# Patient Record
Sex: MALE | Race: BLACK OR AFRICAN AMERICAN | NOT HISPANIC OR LATINO | Employment: OTHER | URBAN - METROPOLITAN AREA
[De-identification: names, ages, dates, MRNs, and addresses within clinical notes are randomized per-mention and may not be internally consistent; named-entity substitution may affect disease eponyms.]

---

## 2017-02-13 DIAGNOSIS — Z76.82 ORGAN TRANSPLANT CANDIDATE: Primary | ICD-10-CM

## 2017-02-13 DIAGNOSIS — N18.6 ESRD (END STAGE RENAL DISEASE): Primary | ICD-10-CM

## 2017-02-14 ENCOUNTER — HOSPITAL ENCOUNTER (OUTPATIENT)
Dept: RADIOLOGY | Facility: HOSPITAL | Age: 42
Discharge: HOME | End: 2017-02-14
Attending: NURSE PRACTITIONER | Admitting: INTERNAL MEDICINE
Payer: MEDICARE

## 2017-02-14 ENCOUNTER — HOSPITAL ENCOUNTER (OUTPATIENT)
Dept: CARDIOLOGY | Facility: CLINIC | Age: 42
Discharge: HOME | End: 2017-02-14
Payer: MEDICARE

## 2017-02-14 ENCOUNTER — HOSPITAL ENCOUNTER (OUTPATIENT)
Dept: RADIOLOGY | Facility: HOSPITAL | Age: 42
Discharge: HOME | End: 2017-02-14
Attending: NURSE PRACTITIONER
Payer: MEDICARE

## 2017-02-14 ENCOUNTER — OFFICE VISIT (OUTPATIENT)
Dept: TRANSPLANT | Facility: CLINIC | Age: 42
End: 2017-02-14
Payer: MEDICARE

## 2017-02-14 VITALS
HEIGHT: 67 IN | WEIGHT: 161.63 LBS | SYSTOLIC BLOOD PRESSURE: 158 MMHG | RESPIRATION RATE: 18 BRPM | OXYGEN SATURATION: 97 % | TEMPERATURE: 98 F | HEART RATE: 60 BPM | DIASTOLIC BLOOD PRESSURE: 93 MMHG | BODY MASS INDEX: 25.37 KG/M2

## 2017-02-14 DIAGNOSIS — R41.89 PERSISTENT COGNITIVE IMPAIRMENT: ICD-10-CM

## 2017-02-14 DIAGNOSIS — Z76.82 ORGAN TRANSPLANT CANDIDATE: ICD-10-CM

## 2017-02-14 DIAGNOSIS — Z99.2 CONTROLLED TYPE 2 DIABETES MELLITUS WITH CHRONIC KIDNEY DISEASE ON CHRONIC DIALYSIS, WITHOUT LONG-TERM CURRENT USE OF INSULIN: Chronic | ICD-10-CM

## 2017-02-14 DIAGNOSIS — I35.9 NONRHEUMATIC AORTIC VALVE DISORDER: ICD-10-CM

## 2017-02-14 DIAGNOSIS — N25.81 HYPERPARATHYROIDISM, SECONDARY RENAL: ICD-10-CM

## 2017-02-14 DIAGNOSIS — N18.6 ESRD (END STAGE RENAL DISEASE): Primary | ICD-10-CM

## 2017-02-14 DIAGNOSIS — N18.6 CONTROLLED TYPE 2 DIABETES MELLITUS WITH CHRONIC KIDNEY DISEASE ON CHRONIC DIALYSIS, WITHOUT LONG-TERM CURRENT USE OF INSULIN: Chronic | ICD-10-CM

## 2017-02-14 DIAGNOSIS — E11.22 CONTROLLED TYPE 2 DIABETES MELLITUS WITH CHRONIC KIDNEY DISEASE ON CHRONIC DIALYSIS, WITHOUT LONG-TERM CURRENT USE OF INSULIN: Chronic | ICD-10-CM

## 2017-02-14 LAB
DIASTOLIC DYSFUNCTION: YES
ESTIMATED PA SYSTOLIC PRESSURE: 19
RETIRED EF AND QEF - SEE NOTES: 60 (ref 55–65)
TRICUSPID VALVE REGURGITATION: ABNORMAL

## 2017-02-14 PROCEDURE — 71020 XR CHEST PA AND LATERAL: CPT | Mod: 26,,, | Performed by: RADIOLOGY

## 2017-02-14 PROCEDURE — 99213 OFFICE O/P EST LOW 20 MIN: CPT | Mod: PBBFAC

## 2017-02-14 PROCEDURE — 76770 US EXAM ABDO BACK WALL COMP: CPT | Mod: 26,GC,, | Performed by: RADIOLOGY

## 2017-02-14 PROCEDURE — 93306 TTE W/DOPPLER COMPLETE: CPT | Mod: 26,S$PBB,, | Performed by: INTERNAL MEDICINE

## 2017-02-14 PROCEDURE — 99999 PR PBB SHADOW E&M-EST. PATIENT-LVL III: CPT | Mod: PBBFAC,,,

## 2017-02-14 PROCEDURE — 99215 OFFICE O/P EST HI 40 MIN: CPT | Mod: S$PBB,,, | Performed by: INTERNAL MEDICINE

## 2017-02-14 NOTE — MR AVS SNAPSHOT
[facility]  [address]  [facility] [address]  Phone: [phone]                  Haroldo Dickinson   2017 12:30 PM   Office Visit    Description:  Male : 1975   Provider:  KIDNEY LISTED, TRANSPLANT   Department:  [facility]           Reason for Visit     Kidney Transplant Evaluation           Diagnoses this Visit        Comments    ESRD (end stage renal disease)    -  Primary     Controlled type 2 diabetes mellitus with chronic kidney disease on chronic dialysis, without long-term current use of insulin         Renal hypertension, stage 5 chronic kidney disease or end stage renal disease                To Do List           Future Appointments        Provider Department Dept Phone    2017 4:00 PM ECHO, [facility] [phone]      Goals (5 Years of Data)     None      [facility]     [facility] Care [facility] -  Assistance  Registered nurses in the [facility] provide clinical advisement, health education, appointment booking, and other advisory services.  Call for this free service at [phone].             Medications           Message regarding Medications     Verify the changes and/or additions to your medication regime listed below are the same as discussed with your clinician today.  If any of these changes or additions are incorrect, please notify your healthcare provider.             Verify that the below list of medications is an accurate representation of the medications you are currently taking.  If none reported, the list may be blank. If incorrect, please contact your healthcare provider. Carry this list with you in case of emergency.           Current Medications     amlodipine (NORVASC) 5 MG tablet Take 1 tablet (5 mg total) by mouth 2 (two) times daily.    aspirin 81 MG Chew Take 1 tablet (81 mg total) by mouth once daily.    calcium acetate (CALPHRON) 667 mg tablet Take 1,334 mg by mouth 3  "(three) times daily with meals.    metoprolol tartrate (LOPRESSOR) 25 MG tablet Take 25 mg by mouth 2 (two) times daily.    RENVELA 800 mg Tab 800 mg 3 (three) times daily with meals.            Clinical Reference Information           Your Vitals Were     BP Pulse Temp Resp Height Weight    158/93 (BP Location: Left arm, Patient Position: Sitting, BP Method: Automatic) 60 98.2 °F (36.8 °C) (Oral) 18 5' 7" (1.702 m) 73.3 kg (161 lb 9.6 oz)    SpO2 BMI             97% 25.31 kg/m2         Blood Pressure          Most Recent Value    BP  (!)  158/93      Allergies as of 2/14/2017     No Known Allergies      Immunizations Administered on Date of Encounter - 2/14/2017     None      Maintenance Dialysis History     Start End Type Comments Center    12/16/2013 1/6/2016 Hemo  [facility]            Current Dialysis Center Information     No center information to display.            Transplant Information        Txp Date Organ Coordinator Care Team     Kidney Le Brito RN Referring Physician:  Kristy Pastrana DO   Current Nephrologist:  DO Suzan CottoNetwork [facility] Sign-Up     Activating your [facility] account is as easy as 1-2-3!     1) Visit [email or web address], select Sign Up Now, enter this activation code and your date of birth, then select Next.  [email or web address]  Expires: 3/31/2017  3:04 PM      2) Create a username and password to use when you visit [facility] in the future and select a security question in case you lose your password and select Next.    3) Enter your e-mail address and click Sign Up!    Additional Information  If you have questions, please e-mail [email or web address] or call [phone] to talk to our [facility] staff. Remember, [facility] is NOT to be used for urgent needs. For medical emergencies, dial 911.         Instructions    From your doctor:  Thank you for visiting us today and considering kidney transplantation.    Work with your dialysis team to keep " phosphorus and PTH controlled.  If they remain poorly controlled, it can prevent you from having a transplant.  Please feel free to contact us with any questions or concerns.  Regards,  Dr. Malgorzata Mccray        Kidney Transplant  A kidney transplant is the preferred treatment for kidney failure. It is a surgery to place a donated kidney into your body. This kidney takes over the job of filtering your blood. The transplant is the treatment closest to having your own healthy kidney.  Finding a new kidney  The new kidney may come from a living person (living donor).  This person does not have to be related to you as long as his or her kidney matches your immune system. A person can safely donate a kidney as long as he or she is in good health and passes all the medical tests. Living donor kidneys function much longer than  donor kidney. . Therefore, you are strongly encouraged to discuss this with your friends and family usually a family member. A kidney may also come from a person who has recently  ( donor). In these cases, permission is needed from the donor's family. There is a waiting list for kidneys from  donors ranging from months to years, depending on your blood type.  Preparing for surgery  You have to undergo a series of tests in the transplant center to make sure that you are eligible for transplantation.  · Tests are done to make sure you don't have any serious infections, such as AIDS or hepatitis B.  · Your vital organs, such as the heart and lungs, need to be working well to withstand the surgery and the anti-rejection medications.  · A  will assess your financial situation and social support.  · Your body's immune system will try to damage the new kidney immediately after the surgery (acute rejection). Taking anti-rejection medications right after the surgery can prevent this.  · Your doctor and nurse will explain your roles and responsibilities with the new  kidney. Keeping the kidney working for a long time requires your deep involvement.  · Your doctor will discuss the possible complications of surgery as well as the possible side effects of the post transplant medications you will need to take.  · Tests are also done to make sure that the donor kidney is compatible with your body's immune system, to prevent an acute rejection reaction after the transplantation.    The transplant surgery  · The surgery usually takes about 3 to 6 hours.  · All kidneys are screened for disease before the transplant.  · In most cases, your old kidneys will not be removed. This is because even failed kidneys release chemicals that help your body work.  · The new kidney is placed in the lower part of your abdomen near your groin.  · The new kidney is attached to nearby blood vessels. Blood can then flow through to be filtered.  · The ureter is connected to your bladder to let urine flow out.  Recovering from surgery  You (and any living donor) will recover in the hospital after surgery. The donor may stay in the hospital for up to a week. You may stay longer. Your new kidney may start to work right away or may take a few weeks. You will be told what to do and what not to do while youre healing.  After your surgery  You will need to take anti-rejection medications to keep your body from rejecting your transplanted kidney. These will be needed for the rest of your life. Your health care team will discuss possible side effects of these medications with you. You will need to monitor your health for signs of kidney rejection including decreasing urine output, fevers, pain at the site of your new kidney  Notes to the living donor  Here are some things to know before donating a kidney:  · You can only donate if your blood type is compatible with the recipient's. Other types of matching will also be done. The testing process may require a hospital stay of 24 to 48 hours.  · You will be given a  "thorough medical checkup to be sure you are healthy enough to donate.  · Your kidney is usually removed by a laparoscopic method, using a few small incisions. In some cases, an "open" procedure is used, which requires a longer incision. Recovery time is generally longer after an open procedure.  · If you have one kidney removed, the other kidney will take over and keep you healthy. Talk to your doctor about the possible complications of surgery.  · Some of the costs of donating a kidney are covered by the recipient's insurance.  The need for kidneys for transplantation continues to increase, while the number of kidney living and  donors has remained  constant. If you want to donate a kidney when you die, contact The Human Resources and Services Administration at www.organdonor.gov for more information. Additional information can also be found at the [employer] at www.transplantliving.org.  Date Last Reviewed: 2014  © 5249-3609 [employer]. [address] [place] [address]. All rights reserved. This information is not intended as a substitute for professional medical care. Always follow your healthcare professional's instructions.        Kidney Disease: Balancing Calcium and Phosphorus    Calcium and phosphorus are minerals found in many foods. Your body works best when these minerals are in balance. But if you have kidney disease, phosphorus may build up in your blood. This can weaken your bones over time. To help keep your bones strong, control the amount of phosphorus in your body. This sheet tells you how.  Take phosphate binders  Phosphate binders are medications that stick to the phosphorus in the food you eat. This keeps the phosphorus from being absorbed into your body. Instead, the phosphorus passes from your body with stool (solid waste). For best results, keep these tips in mind:  ? Use only the type of phosphate binder that your health care " provider recommends. The type of binder that you should be taking is ___________________________  ? Always take phosphate binders as directed.  ? With meals  ? Other _________________________  ? Phosphate binders can cause constipation. You may need to eat more fiber or take stool softeners.  Limit these foods   Do not eat these foods   To keep calcium and phosphorus in balance, limit the amount of phosphorus you eat. To do so, eat less of the following foods:  · Milk  · Chocolate  · Cheese  · Beer  · Yogurt  · Soybeans  · Ice cream   Some foods are so high in phosphorus that you may need to stop eating them. Talk with your dietitian before eating these foods:  · Cola drinks  · Dried or baked beans  · Nuts and seeds of all kinds  · Peanut butter  · Split peas  · Whole-grain cereals   Date Last Reviewed: 12/4/2014  © 2910-0757 [employer]. [address] [place] [address]. All rights reserved. This information is not intended as a substitute for professional medical care. Always follow your healthcare professional's instructions.             Language Assistance Services     ATTENTION: Language assistance services are available, free of charge. Please call [phone].      ATENCIÓN: Si corbyla xiomara, tiene a loya disposición servicios gratuitos de asistencia lingüística. Llame al [phone].     SOY Ý: N?u b?n nói Ti?ng Vi?t, có các d?ch v? h? tr? ngôn ng? mi?n phí dành cho b?n. G?i s? [phone].         [facility] complies with applicable Federal civil rights laws and does not discriminate on the basis of race, color, national origin, age, disability, or sex.

## 2017-02-14 NOTE — PROGRESS NOTES
Kidney Transplant Recipient Reevalulation    Referring Physician: Kristy Pastrana  Current Nephrologist: Kristy Pastrana  Waitlist Status: active  Dialysis Start Date: (Not currently on dialysis)    Subjective:     CC:  Annual reassessment of kidney transplant candidacy.    HPI:  Mr. Dickinson is a 41 y.o. year old Black or  male with ESRD secondary to diabetic nephropathy and HTN.  He has been on the wait list for a kidney transplant at [facility] since 12/16/2013. Patient is currently on hemodialysis started on 12/2013. Patient is dialyzing on TTS schedule.  Patient reports that he is tolerating dialysis well.. He has a RUE AV fistula. Patient denies any recent hospitalizations or ED visits.    Emanuel has ESRD from diabetes and hypertension.  Overall he feels he is doing well.  He states he has not gotten any organ offers yet, although coordinator notes that he declined a high K DPI kidney.  His record indicates he has memory issues, but he feels he does fairly well on his own and memory is okay.  He states he is an uric and has no lower urinary tract symptoms.  He states he has had problems with phosphorus control in the dialysis unit.    Pertinent history:  ESRD from DM & HTN since 12/2013 (record indicates primary causes DM, however he believes its HTN)  Secondary hyperparathyroidism of renal origin with phosphorus control  CVA with residual right lower extremity weakness  Cognitive impairment on neuropsych testing, requiring strong and uninterrupted caregiver support  History of NSTEMI 2013 and LVH    Review of Systems   Constitutional: Negative for unexpected weight change.   HENT: Negative.    Eyes: Negative.  Negative for visual disturbance.   Respiratory: Negative for shortness of breath.    Cardiovascular: Negative for chest pain.   Gastrointestinal: Negative for abdominal pain.   Genitourinary: Negative for difficulty urinating.   Musculoskeletal: Negative for gait problem.   Skin: Negative for  rash.   Neurological: Positive for weakness (right-sided).        Significant cognitive impairment confirmed on neuropsychiatric testing.   Hematological: Does not bruise/bleed easily.       Objective:   body mass index is 25.31 kg/(m^2).    Physical Exam   Constitutional: He is oriented to person, place, and time. He appears well-developed and well-nourished.   HENT:   Head: Normocephalic.   Eyes: Conjunctivae are normal.   Neck: No JVD present.   Pulmonary/Chest: Effort normal and breath sounds normal.   Abdominal: Soft. He exhibits no mass. There is no tenderness.   Musculoskeletal: He exhibits no edema.   Lymphadenopathy:     He has no cervical adenopathy.   Neurological: He is alert and oriented to person, place, and time.   Skin: Skin is dry. No rash noted.   Psychiatric: He has a normal mood and affect. Judgment normal.     Labs:  Lab Results   Component Value Date    WBC 7.94 09/21/2015    HGB 13.1 (L) 09/21/2015    HCT 41.6 09/21/2015     09/22/2015    K 4.1 09/22/2015    CL 98 09/22/2015    CO2 25 09/22/2015    BUN 53 (H) 09/22/2015    CREATININE 13.8 (H) 09/22/2015    EGFRNONAA 4.0 (A) 09/22/2015    CALCIUM 9.6 09/22/2015    PHOS 8.4 (H) 01/07/2014    MG 2.4 01/07/2014    ALBUMIN 3.8 09/22/2015    AST 8 (L) 09/22/2015    ALT 9 (L) 09/22/2015    UTPCR 3.2 (H) 12/15/2013    .0 (H) 02/14/2017     Lab Results   Component Value Date    BILIRUBINUA Negative 12/15/2013    PROTEINUA 3+ (A) 12/15/2013    NITRITE Negative 12/15/2013    RBCUA 2 12/15/2013    WBCUA 4 12/15/2013     Lab Results   Component Value Date    CPRA 0 12/06/2016    TK8MMKH B78 12/06/2016    CIABCLM B*15:11-- WEAK B*27:08, B*51:01 12/06/2016    CIIAB DQA1*05:01 12/06/2016    ABCMT WEAK DRB5*01:01 12/06/2016       Labs were reviewed with the patient.    Pre-transplant Workup:   Reviewed with the patient.    Assessment:     1. ESRD (end stage renal disease) started dialysis 01/2014    2. Controlled type 2 diabetes mellitus with  chronic kidney disease on chronic dialysis, without long-term current use of insulin    3. Renal hypertension, stage 5 chronic kidney disease or end stage renal disease    4. Persistent cognitive impairment        Plan:     Transplant Candidacy:   Mr. Dickinson is a suitable kidney transplant candidate.  He remains in overall stable health, and will remain active on the transplant list.   I discussed with him his PTH of 898 today.  He admits he has been having difficulty with phosphorus control.  I emphasized to him and his mom the importance of controlling both, since they can cause significant vascular calcifications that could preclude transplant future.   I recommend that we query dialysis unit every 6 months to assess his current phosphorus control.  As previously noted by Dr. Byrd, strong and uninterrupted caregiver support will be essential for his transplant success.  He had basic questions about the transplant process, and these were reviewed with him.  Additionally information provided and AVS about the kidney transplant procedure and low phosphorus diet.    I noted he had previously signed [facility]PI consent refused to consider these offers.  I brought this issue up with his mom and him, they stated they would think about it.  I explained the importance of him accepting any kidney offer.    Anu Aldridge MD       Follow-up:   In addition to the tests noted in the plan, Mr. Dickinson will continue to have reevaluation as per the standing pre-kidney transplant protocol:  1. Monthly blood for PRA  2. Annual return to clinic, except HIV positive, > 65 years of age, or pancreas transplant candidates who will be scheduled to see transplant every 6 months while in pre-transplant phase  3. Annual re-testing: CXR, EKG, yearly mammograms for women over 40 and PSA for males over 40, cardiology follow-up as recommended by initial cardiology pre-transplant evaluation  4. Renal ultrasound every 2  years  5. Baseline colonoscopy after age 50 and repeated as recommended    UNOS Patient Status  Functional Status: 40% - Disabled: requires special care and assistance  Physical Capacity: Limited Mobility (walks with cane and uses wheelchair for today's appointments)

## 2017-02-14 NOTE — PATIENT INSTRUCTIONS
From your doctor:  Thank you for visiting us today and considering kidney transplantation.    Work with your dialysis team to keep phosphorus and PTH controlled.  If they remain poorly controlled, it can prevent you from having a transplant.  Please feel free to contact us with any questions or concerns.  Regards,  Dr. Malgorzata Mccray        Kidney Transplant  A kidney transplant is the preferred treatment for kidney failure. It is a surgery to place a donated kidney into your body. This kidney takes over the job of filtering your blood. The transplant is the treatment closest to having your own healthy kidney.  Finding a new kidney  The new kidney may come from a living person (living donor).  This person does not have to be related to you as long as his or her kidney matches your immune system. A person can safely donate a kidney as long as he or she is in good health and passes all the medical tests. Living donor kidneys function much longer than  donor kidney. . Therefore, you are strongly encouraged to discuss this with your friends and family usually a family member. A kidney may also come from a person who has recently  ( donor). In these cases, permission is needed from the donor's family. There is a waiting list for kidneys from  donors ranging from months to years, depending on your blood type.  Preparing for surgery  You have to undergo a series of tests in the transplant center to make sure that you are eligible for transplantation.  · Tests are done to make sure you don't have any serious infections, such as AIDS or hepatitis B.  · Your vital organs, such as the heart and lungs, need to be working well to withstand the surgery and the anti-rejection medications.  · A  will assess your financial situation and social support.  · Your body's immune system will try to damage the new kidney immediately after the surgery (acute rejection). Taking anti-rejection  medications right after the surgery can prevent this.  · Your doctor and nurse will explain your roles and responsibilities with the new kidney. Keeping the kidney working for a long time requires your deep involvement.  · Your doctor will discuss the possible complications of surgery as well as the possible side effects of the post transplant medications you will need to take.  · Tests are also done to make sure that the donor kidney is compatible with your body's immune system, to prevent an acute rejection reaction after the transplantation.    The transplant surgery  · The surgery usually takes about 3 to 6 hours.  · All kidneys are screened for disease before the transplant.  · In most cases, your old kidneys will not be removed. This is because even failed kidneys release chemicals that help your body work.  · The new kidney is placed in the lower part of your abdomen near your groin.  · The new kidney is attached to nearby blood vessels. Blood can then flow through to be filtered.  · The ureter is connected to your bladder to let urine flow out.  Recovering from surgery  You (and any living donor) will recover in the hospital after surgery. The donor may stay in the hospital for up to a week. You may stay longer. Your new kidney may start to work right away or may take a few weeks. You will be told what to do and what not to do while youre healing.  After your surgery  You will need to take anti-rejection medications to keep your body from rejecting your transplanted kidney. These will be needed for the rest of your life. Your health care team will discuss possible side effects of these medications with you. You will need to monitor your health for signs of kidney rejection including decreasing urine output, fevers, pain at the site of your new kidney  Notes to the living donor  Here are some things to know before donating a kidney:  · You can only donate if your blood type is compatible with the  "recipient's. Other types of matching will also be done. The testing process may require a hospital stay of 24 to 48 hours.  · You will be given a thorough medical checkup to be sure you are healthy enough to donate.  · Your kidney is usually removed by a laparoscopic method, using a few small incisions. In some cases, an "open" procedure is used, which requires a longer incision. Recovery time is generally longer after an open procedure.  · If you have one kidney removed, the other kidney will take over and keep you healthy. Talk to your doctor about the possible complications of surgery.  · Some of the costs of donating a kidney are covered by the recipient's insurance.  The need for kidneys for transplantation continues to increase, while the number of kidney living and  donors has remained  constant. If you want to donate a kidney when you die, contact The Human Resources and Services Administration at www.organdonor.gov for more information. Additional information can also be found at the [employer] at www.transplantliving.org.  Date Last Reviewed: 2014-2016 [employer]. [address] [place] [address]. All rights reserved. This information is not intended as a substitute for professional medical care. Always follow your healthcare professional's instructions.        Kidney Disease: Balancing Calcium and Phosphorus    Calcium and phosphorus are minerals found in many foods. Your body works best when these minerals are in balance. But if you have kidney disease, phosphorus may build up in your blood. This can weaken your bones over time. To help keep your bones strong, control the amount of phosphorus in your body. This sheet tells you how.  Take phosphate binders  Phosphate binders are medications that stick to the phosphorus in the food you eat. This keeps the phosphorus from being absorbed into your body. Instead, the phosphorus passes from " your body with stool (solid waste). For best results, keep these tips in mind:  ? Use only the type of phosphate binder that your health care provider recommends. The type of binder that you should be taking is ___________________________  ? Always take phosphate binders as directed.  ? With meals  ? Other _________________________  ? Phosphate binders can cause constipation. You may need to eat more fiber or take stool softeners.  Limit these foods   Do not eat these foods   To keep calcium and phosphorus in balance, limit the amount of phosphorus you eat. To do so, eat less of the following foods:  · Milk  · Chocolate  · Cheese  · Beer  · Yogurt  · Soybeans  · Ice cream   Some foods are so high in phosphorus that you may need to stop eating them. Talk with your dietitian before eating these foods:  · Cola drinks  · Dried or baked beans  · Nuts and seeds of all kinds  · Peanut butter  · Split peas  · Whole-grain cereals   Date Last Reviewed: 12/4/2014  © 7429-8632 [employer]. [address] [place] [address], [place] [address]. All rights reserved. This information is not intended as a substitute for professional medical care. Always follow your healthcare professional's instructions.

## 2017-02-14 NOTE — PROGRESS NOTES
Patient has fistula to right lower arm. Dialysis tues, thurs, sat. Denies hospital stay, er visit, surgery in past year.

## 2017-02-15 NOTE — PROGRESS NOTES
LISTED PATIENT EDUCATION NOTE    Mr. Haroldo Dickinson was seen in listed pre-kidney transplant clinic for re evaluation for kidney, kidney/pancreas or pancreas only transplant.  The patient attended a group education session that discussed/reviewed the following aspects of transplantation: evaluation and selection committee process, UNOS waitlist management/multiple listings, types of organs offered (KDPI < 85%, KDPI > 85%, PHS increased risk, DCD), financial aspects, surgical procedures, dietary instruction pre- and post-transplant, health maintenance pre- and post-transplant, post-transplant hospitalization and outpatient follow-up, potential to participate in a research protocol, and medication management and side effects.  A question and answer session was provided after the presentation.    The patient was seen by all members of the multi-disciplinary team to include: Nephrologist/PA, Surgeon, , Transplant Coordinator, , Pharmacist and Dietician (if applicable).    The patient reviewed and signed all consents for evaluation which were witnessed and sent to scanning into the EPIC chart.    The patient was given an education book and plan for further evaluation based on his individual assessment.      The patient was encouraged to call with any questions or concerns.

## 2017-02-16 NOTE — PROGRESS NOTES
Haroldo Dickinson'S kidney transplant status reviewed.  Pt is due for follow up appointments in February 2018.  Appointments will be scheduled per protocol.  HLA sample is current and being received on a regular basis.

## 2017-02-20 NOTE — PROGRESS NOTES
Transplant Recipient Adult Psychosocial Assessment Update    Haroldo Dickinson  [address] [address]    Telephone Information:   Mobile [phone]   Home  [phone] (home)  Work  There is no work phone number on file.  E-mail  No e-mail address on record    Sex: male  YOB: 1975  Age: 41 y.o.    Encounter Date: 2/14/2017  [place] Citizen: yes  Primary Language: English   Needed: no    Emergency Contact:  Name: Gabby Dickinson   Relationship: mother,   53  Yr  Old female,  A&Ox[employer].    Address: [address] [place] [address].    Phone Numbers:  N/a (home), na  (work), [phone] (mobile)    Family/Social Support:   Number of dependents/: Pt  Has  One   14 yr  Old  Son,  Who  Lives with former spouse,  Son is  Haroldo Dickinson Jr, her is  A&Ox4,  Goes  To  School  Full  Time   Marital history: one  Marriage, ; in a four month relationship with Alexa Esteban [phone]  Other family dynamics: Mother came with pt to [facility].  Pt's parents are both living.  Father is Hamilton [employer] [phone] and not very involved in pt's care.  Pt has one sister and two brothers:  Ariadne Dickinson, sister age 36, drives [phone]; Hamilton Dickinson age 35, brother, drives [phone] are available to assist pt with his care.     Household Composition:    Name: Caroline Dickinson     Age: 75  Yr  Old  Female,    N.O.  [place]    Relationship: grandmother  Does person drive? yes      Do you and your caregivers have access to reliable transportation? yes  PRIMARY CAREGIVER:   jarad Pierre's mother  will be primary caregiver, phone number [phone]. SHE DOES NOT DRIVE AND WILL RELY UPON OTHER FAMILY MEMBERS FOR TRANSPORTATION.  Caregiver states understanding all aspects of caregiver role/commitment.  Caregiver states is able/willing/committed to being caregiver to the fullest extent necessary.  provided in-depth information to patient and caregiver  regarding pre- and post-transplant caregiver role.  Patient and caregiver verbalizes understanding of the education provided today.   strongly encourages patient and caregiver to have concrete plan regarding post-transplant care giving, including back-up caregiver(s) to ensure care giving needs are met as needed.  Patient and caregiver verbalizes understanding of caregiver responsibilities.   remains available. Patient and caregiver agree to contact  in a timely manner if concerns arise.  Able to take time off work without financial concerns: N/A.     Additional Significant Others who will Assist with Transplant:    Ginger Hueratskash, 59, aunt, DYLAN [phone], drives  Caroline Dickinson, 75, grandmother, DYLNA, [phone], drives    Living Will: no  Healthcare Power of : no  Advance Directives on file: <<no information> per medical record.  Verbally reviewed LW/HCPA information.   provided patient with copy of LW/HCPA documents and provided education on completion of forms    Living Donors: No.    Highest Education Level: High School (9-12) or GED  Reading Ability: QUESTIONABLE  SINCE  PT  HAD  CVA IN  NOV 2013 and has some cognitive deficits (see neuropsych testing in pt's emr)  Reports difficulty with: reading, writing, seeing, comprehension, learning and memory  Learns Best By:  COMPLETE  CARE  NEEDED  WITH MEDICATIONS,  DRIVING  AND ADL CARE.     Status: no  VA Benefits: no     Working for Income: No  If no, reason not working: Disability  Spouse/Significant Other Employment:     Disabled: yes: date disability began: 2013, due to: ESRD.    Monthly Income:   Disability: $960.00  Able to afford all costs now and if transplanted, including medications: no  Patient verbalizes understanding of personal responsibilities related to transplant costs and the importance of having a financial plan to ensure that patients transplant costs are fully covered.    provided fundraising information/education.  Patient verbalizes understanding.   remains available.    Insurance:     Payor/Plan Subscr  Sex Relation Sub. Ins. ID Effective Group Num   1. [place]ARE - [place]* TOMMIE VARGAS 1975 Male  [id number] 3/1/14                                    [address]   2. [place] TOM[place] VARGAS 1975 Male  [id number]* 3/1/14                                    [address] [address]       Secondary Insurance (for UNOS reporting): Public Insurance - Medicaid  Patient verbalizes clear understanding that patient may experience difficulty obtaining and/or be denied insurance coverage post-surgery. This includes and is not limited to disability insurance, life insurance, health insurance, burial insurance, long term care insurance, and other insurances.  Patient also reports understanding that future health concerns related to or unrelated to transplantation may not be covered by patient's insurance.  Resources and information provided and reviewed.      Patient provides verbal permission to release any necessary information to outside resources for patient care and discharge planning.  Resources and information provided are reviewed.      Dialysis History:  Type: hemodialysis in center  Schedule: [facility]    Unit: [facility]  [address], [place] [address]  Phone: [phone]  Fax: [phone]  MD Name: Dr Victoriano SIMPSON Phone: [facility]  [phone]  Dialysis Adherence: Patient reports good  Adherence.  Compliance check sent to AKASH, awaiting answer.   GRANDMOTHER DRIVES PT TO DIALYSIS.  Compliance check:  Dialysis unit reports in the past three months pt has had two (17 and 12/3/16) no shows, two (17 and16) AMAs, labs high phosphorus, , transportation no concerns and family support no concerns.  Reported by AKASH SW      Infusion Service: patient utilizing? no  Home Health: patient utilizing?  "no  DME: yes  Uses a cane for ambulation, owns rollator and presented in WC today as "It was a long walk".     Pulmonary/Cardiac Rehab: no    ADLS:  Pt  DOES  NOT  DRIVE,  Had  A  NEW  CVA  IN  NOV OF 2013.  Pt/kenia r state  That  pts  Gait  And balance  Are  Much  Better.  Vision is poor.  Adherence:    Adherence education and counseling provided. Pt states current and expected compliance with all healthcare recommendations.    Per History Section:  Past Medical History   Diagnosis Date    Diabetes mellitus     Dialysis patient     DM type 2 causing renal disease, not at goal     ESRD (end stage renal disease) started dialysis 01/2014 6/5/2014    Hyperparathyroidism, secondary renal 6/5/2014    Hypertension     NSTEMI (non-ST elevated myocardial infarction) 12/21/2013    Organ transplant candidate 6/5/2014    Pneumonia     Renal hypertension     Stroke      Social History   Substance Use Topics    Smoking status: Never Smoker    Smokeless tobacco: Never Used    Alcohol use No     History   Drug Use No     History   Sexual Activity    Sexual activity: Yes    Partners: Female    Birth control/ protection: None       Per Today's Psychosocial:  Tobacco: none, patient denies any use.  Alcohol: none, patient denies any use.  Illicit Drugs/Non-prescribed Medications: none, patient denies any use.    Patient states clear understanding of the potential impact of substance use as it relates to transplant candidacy and is aware of possible random substance screening.  Substance abstinence/cessation counseling, education and resources provided and reviewed.     Arrests/DWI/Treatment/Rehab: patient denies    Psychiatric History:    Mental Health: PT  REPORTS  NO  DIAGNOSIS  OF  DEPRESSION  OR  ANXIETY  PRIRO  TO   CVA .  Pt and mother state he is "angry 20% of the time and isolates when angry".    Psychiatrist/Counselor: none  pe r pt    Medications:  None  Per  Pt.    Suicide/Homicide Issues: Pt denies " current or past suicidal/homicidal ideation.  Pt denies any home safety concerns.     Coping:  Pt states he romulo by watching TV and sitting outside.  He had been attending [employer], but quit going about 3-4 months ago.      Knowledge: Patient states having clear understanding and realistic expectations regarding the potential risks and potential benefits of organ transplantation and organ donation and agrees to discuss with health care team members and support system members, as well as to utilize available resources and express questions and/or concerns in order to further facilitate the pt informed decision-making.  Resources and information provided and reviewed.     Patient is aware of [facility]'s affiliation and/or partnership with agencies in home health care, LTAC, SNF, [facility], and other hospitals and clinics.    Understanding: Patient reports having a clear understanding of the many lifetime commitments involved with being a transplant recipient, including costs, compliance, medications, lab work, procedures, appointments, concrete and financial planning, preparedness, timely and appropriate communication of concerns, abstinence (ETOH, tobacco, illicit non-prescribed drugs), adherence to all health care team recommendations, support system and caregiver involvement, appropriate and timely resource utilization and follow-through, mental health counseling as needed/recommended, and patient and caregiver responsibilities.  Social Service Handbook, resources and detailed educational information provided and reviewed.  Educational information provided.    Patient also reports current and expected compliance with health care regime, and patient states having a clear understanding of the importance of compliance.  Patient reports a clear understanding that risks and benefits may be involved with organ transplantation and with organ donation.  Patient also reports clear understanding that psychosocial risk factors may  affect patient, and include but are not limited to feelings of depression, generalized anxiety, anxiety regarding dependence on others, post traumatic stress disorder, feelings of guilt and other emotional and/or mental concerns, and/or exacerbation of existing mental health concerns.  Detailed resources provided and discussed.  Patient agrees to access appropriate resources in a timely manner as needed and/or as recommended, and to communicate concerns appropriately.  Patient also reports a clear understanding of treatment options available.      Patient and caregiver received education in a group setting.   reviewed education, provided additional information, and answered questions.    Feelings or Concerns: Pt nor mother identified any concerns.      Goals: UNABLE  TO  DETERMINE  AT  THIS  TIME.  .     Interview Behavior: Patient presents as alert and oriented x 4, pleasant, well groomed, calm, cooperative and pt answered questions upon questioning and did not volunteer info or expand upon thoughts.  His mother was present with his permission and was very quiet throughout the interview. .           Transplant Social Work - Candidacy  Assessment/Plan:     Psychosocial Suitability: Patient presents as a marginal candidate for kidney transplant at this time. Based on psychosocial risk factors, patient presents as high risk, due to POST CVA  STATUS and resulting cognitive deficits.  Pt requires assistance with all adls.  He appears to have adequate assistance from elderly grandmother and mother.       Recommendations/Additional Comments: Caregivers need to be present for all appointments.  Recommend fundraising.     Autumn Nolasco LCSW

## 2017-02-23 ENCOUNTER — HOSPITAL ENCOUNTER (EMERGENCY)
Facility: HOSPITAL | Age: 42
Discharge: HOME | End: 2017-02-23
Attending: EMERGENCY MEDICINE | Admitting: EMERGENCY MEDICINE
Payer: MEDICARE

## 2017-02-23 VITALS
RESPIRATION RATE: 16 BRPM | TEMPERATURE: 99 F | HEIGHT: 68 IN | HEART RATE: 77 BPM | WEIGHT: 160 LBS | BODY MASS INDEX: 24.25 KG/M2 | OXYGEN SATURATION: 100 % | SYSTOLIC BLOOD PRESSURE: 152 MMHG | DIASTOLIC BLOOD PRESSURE: 85 MMHG

## 2017-02-23 DIAGNOSIS — N18.6 ESRD (END STAGE RENAL DISEASE): ICD-10-CM

## 2017-02-23 DIAGNOSIS — D64.9 ANEMIA, UNSPECIFIED TYPE: Primary | ICD-10-CM

## 2017-02-23 LAB
ABO + RH BLD: NORMAL
ALBUMIN SERPL BCP-MCNC: 3.9 G/DL
ALP SERPL-CCNC: 97 U/L
ALT SERPL W/O P-5'-P-CCNC: 13 U/L
ANION GAP SERPL CALC-SCNC: 12 MMOL/L
AST SERPL-CCNC: 18 U/L
BASOPHILS # BLD AUTO: 0.16 K/UL
BASOPHILS NFR BLD: 2.2 %
BILIRUB SERPL-MCNC: 1.3 MG/DL
BLD GP AB SCN CELLS X3 SERPL QL: NORMAL
BUN SERPL-MCNC: 20 MG/DL
CALCIUM SERPL-MCNC: 9.4 MG/DL
CHLORIDE SERPL-SCNC: 94 MMOL/L
CO2 SERPL-SCNC: 34 MMOL/L
CREAT SERPL-MCNC: 6.8 MG/DL
DIFFERENTIAL METHOD: ABNORMAL
EOSINOPHIL # BLD AUTO: 0.4 K/UL
EOSINOPHIL NFR BLD: 5.1 %
ERYTHROCYTE [DISTWIDTH] IN BLOOD BY AUTOMATED COUNT: 13.4 %
EST. GFR  (AFRICAN AMERICAN): 10.7 ML/MIN/1.73 M^2
EST. GFR  (NON AFRICAN AMERICAN): 9.3 ML/MIN/1.73 M^2
GLUCOSE SERPL-MCNC: 141 MG/DL
HCT VFR BLD AUTO: 25.7 %
HGB BLD-MCNC: 8.5 G/DL
LYMPHOCYTES # BLD AUTO: 2.6 K/UL
LYMPHOCYTES NFR BLD: 35.3 %
MCH RBC QN AUTO: 30.5 PG
MCHC RBC AUTO-ENTMCNC: 33.1 %
MCV RBC AUTO: 92 FL
MONOCYTES # BLD AUTO: 0.4 K/UL
MONOCYTES NFR BLD: 5.4 %
NEUTROPHILS # BLD AUTO: 3.8 K/UL
NEUTROPHILS NFR BLD: 51.9 %
PLATELET # BLD AUTO: 427 K/UL
PMV BLD AUTO: 8.7 FL
POTASSIUM SERPL-SCNC: 4 MMOL/L
PROT SERPL-MCNC: 9.4 G/DL
RBC # BLD AUTO: 2.79 M/UL
SODIUM SERPL-SCNC: 140 MMOL/L
WBC # BLD AUTO: 7.25 K/UL

## 2017-02-23 PROCEDURE — 99283 EMERGENCY DEPT VISIT LOW MDM: CPT

## 2017-02-23 PROCEDURE — 86850 RBC ANTIBODY SCREEN: CPT

## 2017-02-23 PROCEDURE — 86900 BLOOD TYPING SEROLOGIC ABO: CPT

## 2017-02-23 PROCEDURE — 99284 EMERGENCY DEPT VISIT MOD MDM: CPT | Mod: ,,, | Performed by: EMERGENCY MEDICINE

## 2017-02-23 PROCEDURE — 85025 COMPLETE CBC W/AUTO DIFF WBC: CPT

## 2017-02-23 PROCEDURE — 80053 COMPREHEN METABOLIC PANEL: CPT

## 2017-02-23 RX ORDER — FERROUS SULFATE 325(65) MG
325 TABLET ORAL
Qty: 30 TABLET | Refills: 0 | Status: SHIPPED | OUTPATIENT
Start: 2017-02-23 | End: 2017-03-08

## 2017-02-23 NOTE — ED AVS SNAPSHOT
[facility]  [address]  [facility] [address]               Haroldo Dickinson   2017  6:25 PM   ED    Description:  Male : 1975   Department:  [facility]           Your Care was Coordinated By:     Provider Role From To    Bernardo Fernández MD Attending Provider 17 1381 --      Reason for Visit     Hgb 7.1           Diagnoses this Visit        Comments    Anemia, unspecified type    -  Primary     ESRD (end stage renal disease)           ED Disposition     ED Disposition Condition Comment    Discharge             To Do List           Follow-up Information     Follow up with Montse Liu NP In 2 days.    Specialty:  Family Medicine    Why:  If symptoms worsen    Contact information:    [address] [facility] [address]  [phone]         These Medications        Disp Refills Start End    ferrous sulfate 325 mg (65 mg iron) Tab tablet 30 tablet 0 2017     Take 1 tablet (325 mg total) by mouth daily with breakfast. - Oral    Pharmacy: [facility] [address] [place] [address] [facility]: [phone]         [facility]     [facility] -  Assistance  Registered nurses in the [facility] provide clinical advisement, health education, appointment booking, and other advisory services.  Call for this free service at [phone].             Medications           Message regarding Medications     Verify the changes and/or additions to your medication regime listed below are the same as discussed with your clinician today.  If any of these changes or additions are incorrect, please notify your healthcare provider.        START taking these NEW medications        Refills    ferrous sulfate 325 mg (65 mg iron) Tab tablet 0    Sig: Take 1 tablet (325 mg total) by mouth daily with breakfast.    Class: Print    Route: Oral           Verify that the below list of medications is an  accurate representation of the medications you are currently taking.  If none reported, the list may be blank. If incorrect, please contact your healthcare provider. Carry this list with you in case of emergency.           Current Medications     amlodipine (NORVASC) 5 MG tablet Take 1 tablet (5 mg total) by mouth 2 (two) times daily.    aspirin 81 MG Chew Take 1 tablet (81 mg total) by mouth once daily.    calcium acetate (CALPHRON) 667 mg tablet Take 1,334 mg by mouth 3 (three) times daily with meals.    ferrous sulfate 325 mg (65 mg iron) Tab tablet Take 1 tablet (325 mg total) by mouth daily with breakfast.    metoprolol tartrate (LOPRESSOR) 25 MG tablet Take 25 mg by mouth 2 (two) times daily.    RENVELA 800 mg Tab 800 mg 3 (three) times daily with meals.            Clinical Reference Information           Your Vitals Were     BP                   152/85 (BP Location: Left arm, Patient Position: Sitting)           Allergies as of 2/23/2017        Reactions    No Known Allergies       Immunizations Administered on Date of Encounter - 2/23/2017     None      ED Micro, Lab, POCT     Start Ordered       Status Ordering Provider    02/23/17 1845 02/23/17 1844  CBC auto differential  STAT      Final result     02/23/17 1845 02/23/17 1844  Comprehensive metabolic panel  STAT      Final result       ED Imaging Orders     None        Discharge Instructions       Our goal in the emergency department is to always give you outstanding care and exceptional service. You may receive a survey by mail or e-mail in the next week regarding your experience in our ED. We would greatly appreciate your completing and returning the survey. Your feedback provides us with a way to recognize our staff who give very good care and it helps us learn how to improve when your experience was below our aspiration of excellence.         Anemia  Anemia is a condition that occurs when your body does not have enough healthy red blood cells (RBCs).  Your RBCs are the parts of your blood that carry oxygen throughout your body. A protein called hemoglobin allows your RBCs to absorb and release oxygen. Without enough RBCs or hemoglobin, your body doesn't get enough oxygen. Symptoms of anemia may then occur.    Symptoms of anemia  Some people with anemia have no symptoms. But most people have symptoms that range from mild to severe. These can include:  · Tiredness (fatigue)  · Weakness  · Pale skin  · Shortness of breath  · Dizziness or fainting  · Rapid heartbeat  · Trouble doing normal amounts of activity  · Jaundice (yellowing of your eyes, skin, or mouth; dark urine)  Causes of anemia  Anemia can occur when your body:  · Loses too much blood  · Does not make enough RBCs  · Destroys your RBCs at a faster rate than it can replace them  · Does not make a normal amount of hemoglobin in your RBCs  These problems can occur for many reasons, including:  · A condition that you are born with (congenital or inherited). This includes sickle cell disease or thalassemia.  · Heavy bleeding for any reason, including injury, surgery, childbirth, or even heavy menstrual periods.  · Being low in certain nutrients, such as iron, folate, or vitamin B12. This may be due to poor diet. Also, a condition like celiac disease or Crohn's disease can cause poor absorption of these nutrients  · Certain chronic conditions like diabetes, arthritis, or kidney disease.  · Certain chronic infections like tuberculosis or HIV.  · Exposure to certain medications, such as those used for chemotherapy.  There are different types of anemia. Your doctor can tell you more about the type of anemia you have and what may have caused it.  Diagnosing anemia  To diagnose anemia, your doctor gives you blood tests. These can include:  · Complete blood cell count (CBC). This test measures the amounts of the different types of blood cells.  · Blood smear. This test checks the size and shape of your blood cells.  To perform the test, your doctor views a drop of your blood under a microscope. Your doctor uses a stain to make the blood cells easier to see.  · Iron studies. These tests measure the amount of iron in your blood. Your body needs iron to make hemoglobin in your RBCs.  · Vitamin B12 and folate studies. These tests check for some of the components that help give RBCs a normal size and shape.  · Reticulocyte count. This test measures the amount of new RBCs that your bone marrow makes.  · Hemoglobin electrophoresis. This test checks for problems with your hemoglobin in RBCs.  Treating anemia  Treatment for anemia is based on the type of anemia, its cause, and the severity of your symptoms. Treatments may include:  · Diet changes. This involves increasing the amount of certain nutrients in your diet, such as iron, vitamin B12, or folate. Your doctor may also prescribe nutrient supplements.  · Medications. Certain medications treat the cause of your anemia. Others help build new RBCs or relieve symptoms. If a medication is the cause of your anemia, you may need to stop or change it.  · Blood transfusions. Replacing some of your blood can increase the number of healthy RBCs in your body.  · Surgery. In some cases, your doctor can do surgery to treat the underlying cause of anemia. If you need surgery, your doctor will explain the procedure and outline the risks and benefits for you.  Long-term concerns  If you have a certain type of anemia, you can expect a full recovery after treatment. If you have other types of anemia (especially a type you're born with), you will need to manage it for life. Your doctor can tell you more.  Date Last Reviewed: 4/27/2015 © 2000-2016 [employer] [address] [place] [address], [place] [address]. All rights reserved. This information is not intended as a substitute for professional medical care. Always follow your healthcare professional's instructions.          Anemia and Kidney  Disease     Anemia can cause you to feel tired quickly.     Anemia is a health problem that affects your blood. Normally, the kidneys make a protein (erythropoietin) that tells your body when to make new red blood cells. But if you have kidney disease, your kidneys may not be able to make enough of this protein. Another cause for anemia in kidney disease is iron deficiency. Iron is important in the process of manufacturing red blood cells. It is necessary to replace iron before using medications such as epoetin philip injections. Use this handout to help you understand anemia and the medications that can help control it.  What is anemia?  Anemia occurs when your blood does not have enough red cells in it. Then your blood cant carry as much oxygen to your body. As a result, all your organs are running on too little fuel. Red blood cells make up 35% to 45% of normal blood. If you have anemia, your red cell count (hematocrit) is below 35.  Signs of anemia  Talk with your health care provider if you have any of these signs:  · Ongoing fatigue  · Shortness of breath  · Rapid, irregular heartbeat  · Trouble concentrating  · Impotence  · Feeling dizzy or lightheaded  · Constant feeling of being cold  · Pale skin  Medications can help  If youre at risk for anemia, you may be given a medication called epoetin philip (sometimes called EPO). EPO is a manmade version of erythropoietin. EPO controls anemia by signaling your body to make red blood cells. Most people who take EPO feel better and become more active. Your doctor can also check the blood levels of iron. Iron is the raw material that helps EPO increase the red blood cells. In some cases, you may need additional nutrients, like vitamins and minerals, to help improve your anemia.  How EPO and iron are used  EPO may be used to treat any person with kidney disease who has anemia, but is most often used to treat people on dialysis. EPO is given as an injection under the  skin. This is how most CAPD (Continuous ambulatory peritoneal dialysis) patients receive it. Those on hemodialysis can receive it through their IV if they are unable to tolerate the injections. This method is more expensive and may not be as effective as the injections. If you have low levels of iron, you may need to take iron-containing tablets or IV iron to increase the levels.  Date Last Reviewed: 12/2/2014  © 9660-9874 [employer]. [address] [place] [address], [place] [address]. All rights reserved. This information is not intended as a substitute for professional medical care. Always follow your healthcare professional's instructions.          [facility] Sign-Up     Activating your [facility] account is as easy as 1-2-3!     1) Visit [email or web address], select Sign Up Now, enter this activation code and your date of birth, then select Next.  [email or web address]  Expires: 3/31/2017  3:04 PM      2) Create a username and password to use when you visit [facility] in the future and select a security question in case you lose your password and select Next.    3) Enter your e-mail address and click Sign Up!    Additional Information  If you have questions, please e-mail [email or web address] or call [phone] to talk to our [facility] staff. Remember, [facility] is NOT to be used for urgent needs. For medical emergencies, dial 911.          [facility] complies with applicable Federal civil rights laws and does not discriminate on the basis of race, color, national origin, age, disability, or sex.        Language Assistance Services     ATTENTION: Language assistance services are available, free of charge. Please call [phone].      ATENCIÓN: Si habla español, tiene a loya disposición servicios gratuitos de asistencia lingüística. Llame al [phone].     CHÚ Ý: N?u b?n nói Ti?ng Vi?t, có các d?ch v? h? tr? ngôn ng? mi?n phí dành cho b?n. G?i s? [phone].

## 2017-02-24 NOTE — ED PROVIDER NOTES
Encounter Date: 2/23/2017    SCRIBE #1 NOTE: I, Shanna Mohr, am scribing for, and in the presence of,  Dr. Fernández. I have scribed the entire note.       History     Chief Complaint   Patient presents with    Hgb 7.1     referred to ER after dialysis b/c low hgb 7.1.  Denies SOB or pain . Dialysios Tues-Thur-Sat. Accerss rt arm     Review of patient's allergies indicates:   Allergen Reactions    No known allergies      HPI Comments: Time patient was seen by the provider: 6:42 PM      The patient is a 41 y.o. male with hx of: ESRD on dialysis Tues-Thurs-Sat, DM, HTN, and anemia that presents to the ED with a complaint of being told in dialysis today that he has a low Hgb of 7.1. Patient denies associated symptoms including chest pain, shortness of breath, syncope, lightheadedness, epistaxis, abdominal pain, hematochezia, leg swelling, or melena. Patient has dialysis fistula in right arm.       The history is provided by the patient.     Past Medical History:   Diagnosis Date    Diabetes mellitus     Dialysis patient     DM type 2 causing renal disease, not at goal     ESRD (end stage renal disease) started dialysis 01/2014 6/5/2014    Hyperparathyroidism, secondary renal 6/5/2014    Hypertension     NSTEMI (non-ST elevated myocardial infarction) 12/21/2013    Organ transplant candidate 6/5/2014    Pneumonia     Renal hypertension     Stroke      Past Surgical History:   Procedure Laterality Date    DIALYSIS FISTULA CREATION      VASCULAR SURGERY       Family History   Problem Relation Age of Onset    Hypertension Mother     Diabetes Mother     Hypertension Father     Hypertension Sister     Kidney disease Brother     Hypertension Brother     Heart disease Brother     Cancer Neg Hx      Social History   Substance Use Topics    Smoking status: Never Smoker    Smokeless tobacco: Never Used    Alcohol use No     Review of Systems   Constitutional: Negative for fever.   HENT: Negative for  nosebleeds.    Eyes: Negative for visual disturbance.   Respiratory: Negative for shortness of breath.    Cardiovascular: Negative for chest pain and leg swelling.   Gastrointestinal: Negative for abdominal pain and blood in stool.   Musculoskeletal: Negative for back pain.   Skin: Negative for wound.   Neurological: Negative for syncope and light-headedness.   Psychiatric/Behavioral: Negative for confusion.       Physical Exam   Initial Vitals   BP Pulse Resp Temp SpO2   02/23/17 1444 02/23/17 1444 02/23/17 1444 02/23/17 1444 02/23/17 1444   152/85 77 16 98.6 °F (37 °C) 100 %     Physical Exam    Nursing note and vitals reviewed.  Constitutional: He appears well-developed and well-nourished. He is not diaphoretic. No distress.   HENT:   Head: Atraumatic.   Mouth/Throat: Oropharynx is clear and moist. No oropharyngeal exudate or posterior oropharyngeal erythema.   Eyes: EOM are normal. Pupils are equal, round, and reactive to light.   Neck: Neck supple. No JVD present.   Cardiovascular: Regular rhythm. Exam reveals no gallop.    No murmur heard.  Good thrill to fistula in right forearm    Pulmonary/Chest: Breath sounds normal. No respiratory distress.   Abdominal: Soft. Bowel sounds are normal. He exhibits no mass. There is no tenderness.   Musculoskeletal: He exhibits no edema or tenderness.   Lymphadenopathy:     He has no cervical adenopathy.   Neurological: He is alert and oriented to person, place, and time. He has normal strength. No cranial nerve deficit or sensory deficit.   Skin: Skin is warm and dry.         ED Course   Procedures  Labs Reviewed   CBC W/ AUTO DIFFERENTIAL - Abnormal; Notable for the following:        Result Value    RBC 2.79 (*)     Hemoglobin 8.5 (*)     Hematocrit 25.7 (*)     Platelets 427 (*)     MPV 8.7 (*)     Basophil% 2.2 (*)     All other components within normal limits   COMPREHENSIVE METABOLIC PANEL - Abnormal; Notable for the following:     Chloride 94 (*)     CO2 34 (*)      Glucose 141 (*)     Creatinine 6.8 (*)     Total Protein 9.4 (*)     Total Bilirubin 1.3 (*)     eGFR if  10.7 (*)     eGFR if non  9.3 (*)     All other components within normal limits   TYPE & SCREEN             Medical Decision Making:   History:   Old Medical Records: I decided to obtain old medical records.  Old Records Summarized: other records.       <> Summary of Records: Blood work on 2/21 showed: Hgb of 7.2, Hct of 21.8, creatinine of 15.3, BUN of 71. Stat Hgb today showed Hgb of 7.1.     A CBC from 9/21/15 shows a Hgb of 15.1, which is the latest documented Hgb we have. Will await today's results.   Differential Diagnosis:   My initial differential diagnoses include but are not limited to: anemia of uncertain cause, questioned need for transfusion  Clinical Tests:   Lab Tests: Ordered and Reviewed       <> Summary of Lab: Hgb 8.5 up from apparent 7.1 earlier today. Normal differential. Chemistry: creatinine of 6.8, BUN of 20, potassium of 4.  ED Management:  With Hgb of 8.5, patient does not need transfusion at this time. Will give iron supplementation and have him follow up with PCP for Procrit.             Scribe Attestation:   Scribe #1: I performed the above scribed service and the documentation accurately describes the services I performed. I attest to the accuracy of the note.    Attending Attestation:           Physician Attestation for Scribe:  Physician Attestation Statement for Scribe #1: I, Dr. Fernández, reviewed documentation, as scribed by Shanna Mohr in my presence, and it is both accurate and complete.                 ED Course     Clinical Impression:   The primary encounter diagnosis was Anemia, unspecified type. A diagnosis of ESRD (end stage renal disease) was also pertinent to this visit.    Disposition:   Disposition: Discharged  Condition: Stable       Bernardo Fernández MD  02/24/17 0805

## 2017-02-24 NOTE — ED NOTES
Bed: 18  Expected date: 2/23/17  Expected time: 6:24 PM  Means of arrival:   Comments:  Haroldo Dickinson

## 2017-02-24 NOTE — ED NOTES
LOC: The patient is awake, alert, and aware of environment. The patient is oriented x 3 and speaking appropriately.   APPEARANCE: No acute distress noted.   PSYCHOSOCIAL: Patient is calm and cooperative. Patients insight and judgement are appropriate to situation. Appears clean, well maintained, with clothing appropriate to environment. No evidence of delusions, hallucinations, or psychosis.  SKIN: The skin is warm, dry.  RESPIRATORY: Airway is open and patent. Bilateral chest rise and fall. Respirations are spontaneous, even and unlabored. Normal effort and rate noted. No accessory muscle use noted.   CARDIAC: Patient has a normal rate.  ABDOMEN: Soft.non-tender to palpation. No distention noted. Denies N/V/D   URINARY: Patient reports routine urination without pain, frequency, or urgency. Voids independently. Reports urine appears ashli/yellow in color.  EXTREMITIES: MAEW. Ambulates with cane and with abnormal gait,  NEUROLOGIC: Eyes open spontaneously. Speech clear. Tolerating saliva secretions well. Able to follow commands, demonstrating ability to actively and appropriately communicate within context of current conversation. Symmetrical facial muscles. Moving all extremities well with no noted weakness. Movement is purposeful.  MUSCULOSKELETAL: No obvious deformities noted    TWO IDENTIFIERS HAVE BEEN VERIFIED FOR THIS PATIENT

## 2017-02-24 NOTE — DISCHARGE INSTRUCTIONS
Our goal in the emergency department is to always give you outstanding care and exceptional service. You may receive a survey by mail or e-mail in the next week regarding your experience in our ED. We would greatly appreciate your completing and returning the survey. Your feedback provides us with a way to recognize our staff who give very good care and it helps us learn how to improve when your experience was below our aspiration of excellence.         Anemia  Anemia is a condition that occurs when your body does not have enough healthy red blood cells (RBCs). Your RBCs are the parts of your blood that carry oxygen throughout your body. A protein called hemoglobin allows your RBCs to absorb and release oxygen. Without enough RBCs or hemoglobin, your body doesn't get enough oxygen. Symptoms of anemia may then occur.    Symptoms of anemia  Some people with anemia have no symptoms. But most people have symptoms that range from mild to severe. These can include:  · Tiredness (fatigue)  · Weakness  · Pale skin  · Shortness of breath  · Dizziness or fainting  · Rapid heartbeat  · Trouble doing normal amounts of activity  · Jaundice (yellowing of your eyes, skin, or mouth; dark urine)  Causes of anemia  Anemia can occur when your body:  · Loses too much blood  · Does not make enough RBCs  · Destroys your RBCs at a faster rate than it can replace them  · Does not make a normal amount of hemoglobin in your RBCs  These problems can occur for many reasons, including:  · A condition that you are born with (congenital or inherited). This includes sickle cell disease or thalassemia.  · Heavy bleeding for any reason, including injury, surgery, childbirth, or even heavy menstrual periods.  · Being low in certain nutrients, such as iron, folate, or vitamin B12. This may be due to poor diet. Also, a condition like celiac disease or Crohn's disease can cause poor absorption of these nutrients  · Certain chronic conditions like  diabetes, arthritis, or kidney disease.  · Certain chronic infections like tuberculosis or HIV.  · Exposure to certain medications, such as those used for chemotherapy.  There are different types of anemia. Your doctor can tell you more about the type of anemia you have and what may have caused it.  Diagnosing anemia  To diagnose anemia, your doctor gives you blood tests. These can include:  · Complete blood cell count (CBC). This test measures the amounts of the different types of blood cells.  · Blood smear. This test checks the size and shape of your blood cells. To perform the test, your doctor views a drop of your blood under a microscope. Your doctor uses a stain to make the blood cells easier to see.  · Iron studies. These tests measure the amount of iron in your blood. Your body needs iron to make hemoglobin in your RBCs.  · Vitamin B12 and folate studies. These tests check for some of the components that help give RBCs a normal size and shape.  · Reticulocyte count. This test measures the amount of new RBCs that your bone marrow makes.  · Hemoglobin electrophoresis. This test checks for problems with your hemoglobin in RBCs.  Treating anemia  Treatment for anemia is based on the type of anemia, its cause, and the severity of your symptoms. Treatments may include:  · Diet changes. This involves increasing the amount of certain nutrients in your diet, such as iron, vitamin B12, or folate. Your doctor may also prescribe nutrient supplements.  · Medications. Certain medications treat the cause of your anemia. Others help build new RBCs or relieve symptoms. If a medication is the cause of your anemia, you may need to stop or change it.  · Blood transfusions. Replacing some of your blood can increase the number of healthy RBCs in your body.  · Surgery. In some cases, your doctor can do surgery to treat the underlying cause of anemia. If you need surgery, your doctor will explain the procedure and outline the  risks and benefits for you.  Long-term concerns  If you have a certain type of anemia, you can expect a full recovery after treatment. If you have other types of anemia (especially a type you're born with), you will need to manage it for life. Your doctor can tell you more.  Date Last Reviewed: 4/27/2015 © 2000-2016 [employer]. [address] [place] [address], [place] [address]. All rights reserved. This information is not intended as a substitute for professional medical care. Always follow your healthcare professional's instructions.          Anemia and Kidney Disease     Anemia can cause you to feel tired quickly.     Anemia is a health problem that affects your blood. Normally, the kidneys make a protein (erythropoietin) that tells your body when to make new red blood cells. But if you have kidney disease, your kidneys may not be able to make enough of this protein. Another cause for anemia in kidney disease is iron deficiency. Iron is important in the process of manufacturing red blood cells. It is necessary to replace iron before using medications such as epoetin philip injections. Use this handout to help you understand anemia and the medications that can help control it.  What is anemia?  Anemia occurs when your blood does not have enough red cells in it. Then your blood cant carry as much oxygen to your body. As a result, all your organs are running on too little fuel. Red blood cells make up 35% to 45% of normal blood. If you have anemia, your red cell count (hematocrit) is below 35.  Signs of anemia  Talk with your health care provider if you have any of these signs:  · Ongoing fatigue  · Shortness of breath  · Rapid, irregular heartbeat  · Trouble concentrating  · Impotence  · Feeling dizzy or lightheaded  · Constant feeling of being cold  · Pale skin  Medications can help  If youre at risk for anemia, you may be given a medication called epoetin philip (sometimes called EPO). EPO is a manmade  version of erythropoietin. EPO controls anemia by signaling your body to make red blood cells. Most people who take EPO feel better and become more active. Your doctor can also check the blood levels of iron. Iron is the raw material that helps EPO increase the red blood cells. In some cases, you may need additional nutrients, like vitamins and minerals, to help improve your anemia.  How EPO and iron are used  EPO may be used to treat any person with kidney disease who has anemia, but is most often used to treat people on dialysis. EPO is given as an injection under the skin. This is how most CAPD (Continuous ambulatory peritoneal dialysis) patients receive it. Those on hemodialysis can receive it through their IV if they are unable to tolerate the injections. This method is more expensive and may not be as effective as the injections. If you have low levels of iron, you may need to take iron-containing tablets or IV iron to increase the levels.  Date Last Reviewed: 12/2/2014  © 8845-2003 [employer]. [address] [place] [address], [place] [address]. All rights reserved. This information is not intended as a substitute for professional medical care. Always follow your healthcare professional's instructions.

## 2017-03-04 ENCOUNTER — HOSPITAL ENCOUNTER (EMERGENCY)
Facility: HOSPITAL | Age: 42
Discharge: HOME | End: 2017-03-04
Attending: EMERGENCY MEDICINE | Admitting: EMERGENCY MEDICINE
Payer: MEDICARE

## 2017-03-04 VITALS
RESPIRATION RATE: 20 BRPM | HEART RATE: 71 BPM | BODY MASS INDEX: 25.11 KG/M2 | HEIGHT: 67 IN | OXYGEN SATURATION: 100 % | SYSTOLIC BLOOD PRESSURE: 154 MMHG | TEMPERATURE: 99 F | WEIGHT: 160 LBS | DIASTOLIC BLOOD PRESSURE: 85 MMHG

## 2017-03-04 DIAGNOSIS — Z76.82 AWAITING ORGAN TRANSPLANT STATUS: ICD-10-CM

## 2017-03-04 DIAGNOSIS — D63.8 ANEMIA OF CHRONIC DISEASE: Primary | ICD-10-CM

## 2017-03-04 LAB
ALBUMIN SERPL BCP-MCNC: 3.7 G/DL
ALP SERPL-CCNC: 96 U/L
ALT SERPL W/O P-5'-P-CCNC: 10 U/L
ANION GAP SERPL CALC-SCNC: 11 MMOL/L
AST SERPL-CCNC: 12 U/L
BASOPHILS # BLD AUTO: 0.08 K/UL
BASOPHILS NFR BLD: 0.9 %
BILIRUB SERPL-MCNC: 0.9 MG/DL
BUN SERPL-MCNC: 17 MG/DL
CALCIUM SERPL-MCNC: 9.4 MG/DL
CHLORIDE SERPL-SCNC: 96 MMOL/L
CO2 SERPL-SCNC: 32 MMOL/L
CREAT SERPL-MCNC: 6 MG/DL
DIFFERENTIAL METHOD: ABNORMAL
EOSINOPHIL # BLD AUTO: 0.4 K/UL
EOSINOPHIL NFR BLD: 4 %
ERYTHROCYTE [DISTWIDTH] IN BLOOD BY AUTOMATED COUNT: 13.5 %
EST. GFR  (AFRICAN AMERICAN): 12.5 ML/MIN/1.73 M^2
EST. GFR  (NON AFRICAN AMERICAN): 10.8 ML/MIN/1.73 M^2
GLUCOSE SERPL-MCNC: 93 MG/DL
HCT VFR BLD AUTO: 23.7 %
HGB BLD-MCNC: 7.9 G/DL
INR PPP: 1
LYMPHOCYTES # BLD AUTO: 1.8 K/UL
LYMPHOCYTES NFR BLD: 19.8 %
MCH RBC QN AUTO: 30.5 PG
MCHC RBC AUTO-ENTMCNC: 33.3 %
MCV RBC AUTO: 92 FL
MONOCYTES # BLD AUTO: 0.8 K/UL
MONOCYTES NFR BLD: 8.9 %
NEUTROPHILS # BLD AUTO: 5.9 K/UL
NEUTROPHILS NFR BLD: 66.1 %
PLATELET # BLD AUTO: 393 K/UL
PMV BLD AUTO: 9.1 FL
POTASSIUM SERPL-SCNC: 3.4 MMOL/L
PROT SERPL-MCNC: 8.5 G/DL
PROTHROMBIN TIME: 10.5 SEC
RBC # BLD AUTO: 2.59 M/UL
SODIUM SERPL-SCNC: 139 MMOL/L
WBC # BLD AUTO: 8.98 K/UL

## 2017-03-04 PROCEDURE — 99283 EMERGENCY DEPT VISIT LOW MDM: CPT | Mod: ,,, | Performed by: EMERGENCY MEDICINE

## 2017-03-04 PROCEDURE — 85610 PROTHROMBIN TIME: CPT

## 2017-03-04 PROCEDURE — 99284 EMERGENCY DEPT VISIT MOD MDM: CPT

## 2017-03-04 PROCEDURE — 85025 COMPLETE CBC W/AUTO DIFF WBC: CPT

## 2017-03-04 PROCEDURE — 80053 COMPREHEN METABOLIC PANEL: CPT

## 2017-03-04 NOTE — ED AVS SNAPSHOT
[facility]  [address]  [facility] [address]               Haroldo Dickinson   3/4/2017  1:46 PM   ED    Description:  Male : 1975   Department:  [facility]           Your Care was Coordinated By:     Provider Role From To    Aidan De La Cruz MD Attending Provider [phone] --    Abisai Ray MD Resident [phone] --      Reason for Visit     Abnormal Lab           Diagnoses this Visit        Comments    Anemia of chronic disease    -  Primary       ED Disposition     None           To Do List           [facility]     [facility] Care Line -  Assistance  Registered nurses in the [facility] provide clinical advisement, health education, appointment booking, and other advisory services.  Call for this free service at [phone].             Medications           Message regarding Medications     Verify the changes and/or additions to your medication regime listed below are the same as discussed with your clinician today.  If any of these changes or additions are incorrect, please notify your healthcare provider.             Verify that the below list of medications is an accurate representation of the medications you are currently taking.  If none reported, the list may be blank. If incorrect, please contact your healthcare provider. Carry this list with you in case of emergency.           Current Medications     amlodipine (NORVASC) 5 MG tablet Take 1 tablet (5 mg total) by mouth 2 (two) times daily.    aspirin 81 MG Chew Take 1 tablet (81 mg total) by mouth once daily.    ferrous sulfate 325 mg (65 mg iron) Tab tablet Take 1 tablet (325 mg total) by mouth daily with breakfast.    metoprolol tartrate (LOPRESSOR) 25 MG tablet Take 25 mg by mouth 2 (two) times daily.    RENVELA 800 mg Tab 800 mg 3 (three) times daily with meals.     calcium acetate (CALPHRON) 667 mg tablet Take 1,334 mg by mouth 3  "(three) times daily with meals.           Clinical Reference Information           Your Vitals Were     BP Pulse Temp Resp Height Weight    170/80 (BP Location: Right arm, Patient Position: Sitting) 64 97.5 °F (36.4 °C) (Oral) 20 5' 7" (1.702 m) 72.6 kg (160 lb)    SpO2 BMI             100% 25.06 kg/m2         Allergies as of 3/4/2017        Reactions    No Known Allergies       Immunizations Administered on Date of Encounter - 3/4/2017     None      ED Micro, Lab, POCT     Start Ordered       Status Ordering Provider    03/04/17 1403 03/04/17 1402  CBC auto differential  STAT      Final result     03/04/17 1403 03/04/17 1402  Comprehensive metabolic panel  STAT      In process     03/04/17 1403 03/04/17 1402  Protime-INR  STAT      Final result       ED Imaging Orders     None      [facility] Sign-Up     Activating your [facility] account is as easy as 1-2-3!     1) Visit [email or web address], select Sign Up Now, enter this activation code and your date of birth, then select Next.  [email or web address]  Expires: 3/31/2017  3:04 PM      2) Create a username and password to use when you visit [facility] in the future and select a security question in case you lose your password and select Next.    3) Enter your e-mail address and click Sign Up!    Additional Information  If you have questions, please e-mail [email or web address] or call [phone] to talk to our [facility] staff. Remember, [facility] is NOT to be used for urgent needs. For medical emergencies, dial 911.          [facility] complies with applicable Federal civil rights laws and does not discriminate on the basis of race, color, national origin, age, disability, or sex.        Language Assistance Services     ATTENTION: Language assistance services are available, free of charge. Please call [phone].      ATENCIÓN: Si habla español, tiene a loya disposición servicios gratuitos de asistencia lingüística. Llame al [phone].     CHÚ Ý: " N?u b?n nói Ti?ng Vi?t, có các d?ch v? h? tr? ngôn ng? mi?n phí roberth cho b?n. G?i s? [phone].

## 2017-03-04 NOTE — ED NOTES
Pt identifiers Haroldo Alokchecked and correct  LOC: The patient is awake, alert, aware of environment with an appropriate affect. Oriented x3, speaking appropriately  APPEARANCE: Pt resting comfortably, in no acute distress, pt is clean and well groomed, clothing properly fastened  SKIN: Skin warm, dry and intact, normal skin turgor, moist mucus membranes  RESPIRATORY: Airway is open and patent, respirations are spontaneous, even and unlabored, normal effort and rate  CARDIAC: Normal rate and rhythm, no peripheral edema noted, capillary refill < 3 seconds, bilateral radial pulses 2+  ABDOMEN: Soft, nontender, nondistended. Bowel sounds present   NEUROLOGIC: PERRL, facial expression is symmetrical, patient moving all extremities spontaneously, normal sensation in all extremities when touched with a finger.  Follows all commands appropriately  MUSCULOSKELETAL: No obvious deformities.    Pt identifiers Haroldo Alok checked and correct  LOC: The patient is awake, alert, aware of environment with an appropriate affect. Oriented x3, speaking appropriately  APPEARANCE: Pt resting comfortably, in no acute distress, pt is clean and well groomed, clothing properly fastened  SKIN: Skin warm, dry and intact, normal skin turgor, moist mucus membranes  RESPIRATORY: Airway is open and patent, respirations are spontaneous, even and unlabored, normal effort and rate. Bilateral clear breath sounds throughout.  CARDIAC: Normal rate and rhythm, no peripheral edema noted, capillary refill < 3 seconds, bilateral radial pulses 2+  ABDOMEN: Soft, nontender, nondistended. Bowel sounds present   NEUROLOGIC: PERRL, facial expression is symmetrical, patient moving all extremities spontaneously, normal sensation in all extremities when touched with a finger.  Follows all commands appropriately  MUSCULOSKELETAL: No obvious deformities.

## 2017-03-04 NOTE — ED PROVIDER NOTES
Encounter Date: 3/4/2017       History     Chief Complaint   Patient presents with    Abnormal Lab     Pt comes in today for Low H&H. Pt sent here to recieve blood transfusion HGB 6.6 today. HD pt, rec full treatment today.      Review of patient's allergies indicates:   Allergen Reactions    No known allergies      HPI Comments: Mr. Dickinson is a 40yo M with PMHx of HTN, DM II, CVA (in 2013 with residual RLE weakness), and ESRD (on dialysis TTS since 2013, currently on kidney transplant waitlist) who presents following dialysis treatment this AM after he was told his Hgb was low at 6.6 then repeat at 6.2. Pt denies weakness, HA, vision changes, SOB, CP, or pain.     The history is provided by the patient.     Past Medical History:   Diagnosis Date    Diabetes mellitus     Dialysis patient     DM type 2 causing renal disease, not at goal     ESRD (end stage renal disease) started dialysis 01/2014 6/5/2014    Hyperparathyroidism, secondary renal 6/5/2014    Hypertension     NSTEMI (non-ST elevated myocardial infarction) 12/21/2013    Organ transplant candidate 6/5/2014    Pneumonia     Renal hypertension     Stroke      Past Surgical History:   Procedure Laterality Date    DIALYSIS FISTULA CREATION      VASCULAR SURGERY       Family History   Problem Relation Age of Onset    Hypertension Mother     Diabetes Mother     Hypertension Father     Hypertension Sister     Kidney disease Brother     Hypertension Brother     Heart disease Brother     Cancer Neg Hx      Social History   Substance Use Topics    Smoking status: Never Smoker    Smokeless tobacco: Never Used    Alcohol use No     Review of Systems   Constitutional: Negative for activity change, chills, fatigue and fever.   HENT: Negative for congestion and rhinorrhea.    Eyes: Negative.    Respiratory: Negative for apnea, cough, shortness of breath and wheezing.    Cardiovascular: Negative for chest pain and palpitations.    Gastrointestinal: Negative for abdominal distention, abdominal pain, constipation, diarrhea, nausea and vomiting.   Endocrine: Negative.    Genitourinary: Negative for dysuria and hematuria.   Musculoskeletal: Negative.    Allergic/Immunologic: Negative.    Neurological: Negative for dizziness, syncope, weakness, light-headedness and headaches.   Hematological: Negative.    Psychiatric/Behavioral: Negative.      All systems were reviewed and were negative except as noted in the HPI.    Physical Exam   Initial Vitals   BP Pulse Resp Temp SpO2   03/04/17 1344 03/04/17 1344 03/04/17 1344 03/04/17 1344 03/04/17 1344   170/80 64 20 97.5 °F (36.4 °C) 100 %     Physical Exam    Constitutional: He appears well-developed and well-nourished. No distress.   HENT:   Head: Normocephalic and atraumatic.   Eyes: EOM are normal. Pupils are equal, round, and reactive to light.   Neck: Normal range of motion.   Cardiovascular: Normal rate and regular rhythm.   Pulmonary/Chest: Breath sounds normal. No respiratory distress.   Abdominal: Soft. Bowel sounds are normal. He exhibits no distension. There is no tenderness.   Musculoskeletal: Normal range of motion.   Neurological: He is alert and oriented to person, place, and time.   Skin: Skin is warm and dry.   Psychiatric: He has a normal mood and affect. Thought content normal.         ED Course   Procedures  Labs Reviewed - No data to display          Medical Decision Making:   Differential Diagnosis:   Anemia of Chronic Dx vs Anemia of Unknown Etiology  ED Management:  CBC, CMP, PT/INR ordered.                   ED Course     Clinical Impression:   The encounter diagnosis was Anemia of chronic disease.        ATTENDING PHYSICIAN ATTESTATION  I have repeated the key portions of the resident's history and physical, reviewed and agree with the resident medical documentation, and supervised and managed the medical care of the patient.  Additionally, I was present for the critical  portion of any procedure(s) performed.    Asymptomatic  Anemia slightly improved  No indication for inpatient tx    Discharged to home in stable condition, return to ED warnings given, follow up and patient care instructions given.    Kumar De La Cruz MD, KANIKA, [facility]  Department of Emergency Medicine         Aidan De La Cruz MD  03/05/17 0889

## 2017-03-04 NOTE — ED TRIAGE NOTES
40 Y/o male to ER via [facility] stating he was receiving dialysis at [facility] today and was told his blood counts were low and needs to be seen in ER.

## 2017-03-05 NOTE — PROGRESS NOTES
Haroldo Dickinson'S kidney transplant status reviewed.  Pt is due for follow up appointments in February 2018.  Appointments will be scheduled per protocol.  HLA sample is current and being received on a regular basis. Pt will require follow up with neurology and cardiology. Social work to verify caregiver every 6 months.

## 2017-03-07 ENCOUNTER — TELEPHONE (OUTPATIENT)
Dept: GASTROENTEROLOGY | Facility: CLINIC | Age: 42
End: 2017-03-07

## 2017-03-07 ENCOUNTER — LAB VISIT (OUTPATIENT)
Dept: LAB | Facility: HOSPITAL | Age: 42
End: 2017-03-07
Attending: INTERNAL MEDICINE
Payer: MEDICARE

## 2017-03-07 DIAGNOSIS — D63.1 ERYTHROPOIETIN DEFICIENCY ANEMIA: Primary | ICD-10-CM

## 2017-03-07 LAB — HGB BLD-MCNC: 6.7 G/DL

## 2017-03-07 PROCEDURE — 85018 HEMOGLOBIN: CPT

## 2017-03-08 ENCOUNTER — TELEPHONE (OUTPATIENT)
Dept: ENDOSCOPY | Facility: HOSPITAL | Age: 42
End: 2017-03-08

## 2017-03-08 ENCOUNTER — OFFICE VISIT (OUTPATIENT)
Dept: GASTROENTEROLOGY | Facility: CLINIC | Age: 42
End: 2017-03-08
Payer: MEDICARE

## 2017-03-08 VITALS
WEIGHT: 160.94 LBS | DIASTOLIC BLOOD PRESSURE: 81 MMHG | HEIGHT: 68 IN | HEART RATE: 68 BPM | SYSTOLIC BLOOD PRESSURE: 141 MMHG | BODY MASS INDEX: 24.39 KG/M2

## 2017-03-08 DIAGNOSIS — Z99.2 ESRD ON DIALYSIS: Primary | ICD-10-CM

## 2017-03-08 DIAGNOSIS — N18.6 ESRD ON DIALYSIS: Primary | ICD-10-CM

## 2017-03-08 DIAGNOSIS — Z12.11 SPECIAL SCREENING FOR MALIGNANT NEOPLASMS, COLON: Primary | ICD-10-CM

## 2017-03-08 DIAGNOSIS — D50.9 IRON DEFICIENCY ANEMIA, UNSPECIFIED IRON DEFICIENCY ANEMIA TYPE: Primary | ICD-10-CM

## 2017-03-08 PROCEDURE — 99204 OFFICE O/P NEW MOD 45 MIN: CPT | Mod: S$PBB,,, | Performed by: NURSE PRACTITIONER

## 2017-03-08 PROCEDURE — 99999 PR PBB SHADOW E&M-EST. PATIENT-LVL III: CPT | Mod: PBBFAC,,, | Performed by: NURSE PRACTITIONER

## 2017-03-08 PROCEDURE — 99213 OFFICE O/P EST LOW 20 MIN: CPT | Mod: PBBFAC | Performed by: NURSE PRACTITIONER

## 2017-03-08 RX ORDER — POLYETHYLENE GLYCOL 3350, SODIUM SULFATE ANHYDROUS, SODIUM BICARBONATE, SODIUM CHLORIDE, POTASSIUM CHLORIDE 236; 22.74; 6.74; 5.86; 2.97 G/4L; G/4L; G/4L; G/4L; G/4L
4 POWDER, FOR SOLUTION ORAL ONCE
Qty: 4000 ML | Refills: 0 | Status: SHIPPED | OUTPATIENT
Start: 2017-03-08 | End: 2017-03-08

## 2017-03-08 RX ORDER — DORZOLAMIDE HCL 20 MG/ML
1 SOLUTION/ DROPS OPHTHALMIC 3 TIMES DAILY
COMMUNITY
Start: 2017-01-11 | End: 2020-02-06

## 2017-03-08 RX ORDER — AMLODIPINE BESYLATE 10 MG/1
10 TABLET ORAL DAILY
COMMUNITY
Start: 2017-03-04 | End: 2019-08-20 | Stop reason: SDUPTHER

## 2017-03-08 RX ORDER — CINACALCET HYDROCHLORIDE 30 MG/1
30 TABLET, COATED ORAL
Refills: 11 | COMMUNITY
Start: 2017-02-20 | End: 2020-02-06

## 2017-03-08 RX ORDER — METOPROLOL SUCCINATE 50 MG/1
TABLET, EXTENDED RELEASE ORAL
COMMUNITY
Start: 2016-12-12 | End: 2017-05-19

## 2017-03-08 RX ORDER — LIDOCAINE AND PRILOCAINE 25; 25 MG/G; MG/G
CREAM TOPICAL
Refills: 6 | COMMUNITY
Start: 2016-12-22 | End: 2017-05-19

## 2017-03-08 RX ORDER — LATANOPROST 50 UG/ML
SOLUTION/ DROPS OPHTHALMIC
COMMUNITY
Start: 2017-02-12 | End: 2017-05-19

## 2017-03-08 RX ORDER — B,C/FERROUS FUM/FA/D3/ZINC OX 8MG-800MCG
1 TABLET ORAL DAILY
Refills: 11 | COMMUNITY
Start: 2017-02-16 | End: 2020-02-06
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
## 2017-03-08 NOTE — MR AVS SNAPSHOT
[facility]  [address]  [facility] [address]  Phone: [phone]  Fax: [phone]                  Haroldo Dickinson   3/8/2017 8:00 AM   Office Visit    Description:  Male : 1975   Provider:  Nohelia Kaye NP   Department:  [facility]           Diagnoses this Visit        Comments    Iron deficiency anemia, unspecified iron deficiency anemia type    -  Primary            To Do List           Goals (5 Years of Data)     None      [facility]     [facility] On Call Nurse Care Line -  Assistance  Registered nurses in the [facility] Center provide clinical advisement, health education, appointment booking, and other advisory services.  Call for this free service at [phone].             Medications           Message regarding Medications     Verify the changes and/or additions to your medication regime listed below are the same as discussed with your clinician today.  If any of these changes or additions are incorrect, please notify your healthcare provider.        STOP taking these medications     calcium acetate (CALPHRON) 667 mg tablet Take 1,334 mg by mouth 3 (three) times daily with meals.    ferrous sulfate 325 mg (65 mg iron) Tab tablet Take 1 tablet (325 mg total) by mouth daily with breakfast.           Verify that the below list of medications is an accurate representation of the medications you are currently taking.  If none reported, the list may be blank. If incorrect, please contact your healthcare provider. Carry this list with you in case of emergency.           Current Medications     amlodipine (NORVASC) 10 MG tablet     amlodipine (NORVASC) 5 MG tablet Take 1 tablet (5 mg total) by mouth 2 (two) times daily.    aspirin 81 MG Chew Take 1 tablet (81 mg total) by mouth once daily.    dorzolamide (TRUSOPT) 2 % ophthalmic solution     latanoprost 0.005 % ophthalmic solution     lidocaine-prilocaine (EMLA) cream APPLY SMALL  "AMOUNT TO ACCESS SITE 1 TO 2 HOURS BEFORE TREATMENT. COVER AREA WITH AN OCCLUSIVE DRESSING.    metoprolol succinate (TOPROL-XL) 50 MG 24 hr tablet     metoprolol tartrate (LOPRESSOR) 25 MG tablet Take 25 mg by mouth 2 (two) times daily.    PRORENAL 8 mg iron-800 mcg-1,000 unit Tab Take 1 tablet by mouth once daily.    RENVELA 800 mg Tab 800 mg 3 (three) times daily with meals.     SENSIPAR 30 mg Tab Take 30 mg by mouth before dinner.           Clinical Reference Information           Your Vitals Were     BP Pulse Height Weight BMI    141/81 68 5' 8" (1.727 m) 73 kg (160 lb 15 oz) 24.47 kg/m2      Blood Pressure          Most Recent Value    BP  (!)  141/81      Allergies as of 3/8/2017     No Known Allergies      Immunizations Administered on Date of Encounter - 3/8/2017     None      Orders Placed During Today's Visit      Normal Orders This Visit    Case request GI: ESOPHAGOGASTRODUODENOSCOPY (EGD), COLONOSCOPY     Future Labs/Procedures Expected by Expires    Occult blood x 1, stool  3/8/2017 3/8/2018    Occult blood x 1, stool  3/8/2017 3/8/2018    Occult blood x 1, stool  3/8/2017 3/8/2018      Maintenance Dialysis History     Start End Type Comments Center    12/16/2013  [facility]  [facility]            Current Dialysis Center Information     [facility] [address], [place] Phone #:  [phone]    Contact:  N/A ERICK MANCILLA  [address] Fax #:  [phone]            Transplant Information        Txp Date Organ Coordinator Care Team     Kidney Le Brito RN Referring Physician:  Kristy Pastrana DO   Current Nephrologist:  Shirisha Bodana, DO [facility] Sign-Up     Activating your [facility] account is as easy as 1-2-3!     1) Visit [email or web address], select Sign Up Now, enter this activation code and your date of birth, then select Next.  [email or web address]  Expires: 3/31/2017  3:04 PM      2) Create a username and password to use when you visit [facility] in the " future and select a security question in case you lose your password and select Next.    3) Enter your e-mail address and click Sign Up!    Additional Information  If you have questions, please e-mail [email or web address] or call [phone] to talk to our [facility] staff. Remember, [facility] is NOT to be used for urgent needs. For medical emergencies, dial 911.         Language Assistance Services     ATTENTION: Language assistance services are available, free of charge. Please call [phone].      ATENCIÓN: Si habla español, tiene a loya disposición servicios gratuitos de asistencia lingüística. Llame al [phone].     CHÚ Ý: N?u b?n nói Ti?ng Vi?t, có các d?ch v? h? tr? ngôn ng? mi?n phí dành cho b?n. G?i s? [phone].         [facility] complies with applicable Federal civil rights laws and does not discriminate on the basis of race, color, national origin, age, disability, or sex.

## 2017-03-18 ENCOUNTER — LAB VISIT (OUTPATIENT)
Dept: LAB | Facility: HOSPITAL | Age: 42
End: 2017-03-18
Attending: INTERNAL MEDICINE
Payer: MEDICARE

## 2017-03-18 DIAGNOSIS — D63.1 ANEMIA OF RENAL DISEASE: Primary | ICD-10-CM

## 2017-03-18 DIAGNOSIS — N18.9 ANEMIA OF RENAL DISEASE: Primary | ICD-10-CM

## 2017-03-18 LAB — HGB BLD-MCNC: 7.7 G/DL

## 2017-03-18 PROCEDURE — 85018 HEMOGLOBIN: CPT

## 2017-03-27 DIAGNOSIS — Z76.82 ORGAN TRANSPLANT CANDIDATE: Primary | ICD-10-CM

## 2017-03-30 ENCOUNTER — LAB VISIT (OUTPATIENT)
Dept: LAB | Facility: HOSPITAL | Age: 42
End: 2017-03-30
Attending: INTERNAL MEDICINE
Payer: MEDICARE

## 2017-03-30 DIAGNOSIS — N18.6 END STAGE RENAL DISEASE: Primary | ICD-10-CM

## 2017-03-30 LAB — HGB BLD-MCNC: 8.2 G/DL

## 2017-03-30 PROCEDURE — 85018 HEMOGLOBIN: CPT

## 2017-04-05 ENCOUNTER — TELEPHONE (OUTPATIENT)
Dept: ENDOSCOPY | Facility: HOSPITAL | Age: 42
End: 2017-04-05

## 2017-04-05 ENCOUNTER — SURGERY (OUTPATIENT)
Age: 42
End: 2017-04-05

## 2017-04-05 DIAGNOSIS — D50.9 IRON DEFICIENCY ANEMIA, UNSPECIFIED IRON DEFICIENCY ANEMIA TYPE: Primary | ICD-10-CM

## 2017-04-05 DIAGNOSIS — Z99.2 ESRD ON DIALYSIS: Primary | ICD-10-CM

## 2017-04-05 DIAGNOSIS — Z12.11 SPECIAL SCREENING FOR MALIGNANT NEOPLASMS, COLON: Primary | ICD-10-CM

## 2017-04-05 DIAGNOSIS — N18.6 ESRD ON DIALYSIS: Primary | ICD-10-CM

## 2017-04-05 RX ORDER — POLYETHYLENE GLYCOL 3350, SODIUM SULFATE ANHYDROUS, SODIUM BICARBONATE, SODIUM CHLORIDE, POTASSIUM CHLORIDE 236; 22.74; 6.74; 5.86; 2.97 G/4L; G/4L; G/4L; G/4L; G/4L
4 POWDER, FOR SOLUTION ORAL ONCE
Qty: 4000 ML | Refills: 0 | Status: SHIPPED | OUTPATIENT
Start: 2017-04-05 | End: 2017-04-05

## 2017-04-05 NOTE — TELEPHONE ENCOUNTER
Patient in Endoscopy Scheduling office to reschedule EGD/colonoscopy due to poor prep. Patient stated that he ate a snack yesterday afternoon. Emphasized the importance of being on a clear liquid diet the entire day prior to procedure. Patient verbalized understanding. Medical history and medications reviewed with patient. Patient stated that he could only schedule procedures on Wednesdays due to his dialysis schedule. Procedure scheduled 5/3/17. Patient educated on procedure prep. Verbalized understanding. A set of prep instructions given to patient.

## 2017-05-03 ENCOUNTER — SURGERY (OUTPATIENT)
Age: 42
End: 2017-05-03

## 2017-05-03 ENCOUNTER — HOSPITAL ENCOUNTER (OUTPATIENT)
Facility: HOSPITAL | Age: 42
Discharge: HOME | End: 2017-05-03
Attending: INTERNAL MEDICINE | Admitting: INTERNAL MEDICINE
Payer: MEDICARE

## 2017-05-03 ENCOUNTER — ANESTHESIA (OUTPATIENT)
Dept: ENDOSCOPY | Facility: HOSPITAL | Age: 42
End: 2017-05-03
Payer: MEDICARE

## 2017-05-03 ENCOUNTER — ANESTHESIA EVENT (OUTPATIENT)
Dept: ENDOSCOPY | Facility: HOSPITAL | Age: 42
End: 2017-05-03
Payer: MEDICARE

## 2017-05-03 VITALS
BODY MASS INDEX: 25.43 KG/M2 | TEMPERATURE: 98 F | SYSTOLIC BLOOD PRESSURE: 107 MMHG | DIASTOLIC BLOOD PRESSURE: 56 MMHG | OXYGEN SATURATION: 100 % | HEART RATE: 59 BPM | HEIGHT: 67 IN | RESPIRATION RATE: 20 BRPM | WEIGHT: 162 LBS

## 2017-05-03 DIAGNOSIS — D50.9 IRON DEFICIENCY ANEMIA, UNSPECIFIED IRON DEFICIENCY ANEMIA TYPE: Primary | ICD-10-CM

## 2017-05-03 PROCEDURE — 37000009 HC ANESTHESIA EA ADD 15 MINS: Mod: TXP | Performed by: INTERNAL MEDICINE

## 2017-05-03 PROCEDURE — 27201089 HC SNARE, DISP (ANY): Mod: TXP | Performed by: INTERNAL MEDICINE

## 2017-05-03 PROCEDURE — 43239 EGD BIOPSY SINGLE/MULTIPLE: CPT | Mod: 51,NTX,, | Performed by: INTERNAL MEDICINE

## 2017-05-03 PROCEDURE — 88305 TISSUE EXAM BY PATHOLOGIST: CPT | Mod: 26,NTX,, | Performed by: PATHOLOGY

## 2017-05-03 PROCEDURE — D9220A PRA ANESTHESIA: Mod: CRNA,NTX,, | Performed by: NURSE ANESTHETIST, CERTIFIED REGISTERED

## 2017-05-03 PROCEDURE — 88342 IMHCHEM/IMCYTCHM 1ST ANTB: CPT | Mod: 26,NTX,, | Performed by: PATHOLOGY

## 2017-05-03 PROCEDURE — 63600175 PHARM REV CODE 636 W HCPCS: Mod: NTX | Performed by: NURSE ANESTHETIST, CERTIFIED REGISTERED

## 2017-05-03 PROCEDURE — 45388 COLONOSCOPY W/ABLATION: CPT | Mod: NTX,,, | Performed by: INTERNAL MEDICINE

## 2017-05-03 PROCEDURE — 45385 COLONOSCOPY W/LESION REMOVAL: CPT | Mod: 59,NTX,, | Performed by: INTERNAL MEDICINE

## 2017-05-03 PROCEDURE — 43239 EGD BIOPSY SINGLE/MULTIPLE: CPT | Mod: TXP | Performed by: INTERNAL MEDICINE

## 2017-05-03 PROCEDURE — 25000003 PHARM REV CODE 250: Mod: NTX | Performed by: NURSE ANESTHETIST, CERTIFIED REGISTERED

## 2017-05-03 PROCEDURE — D9220A PRA ANESTHESIA: Mod: ANES,NTX,, | Performed by: ANESTHESIOLOGY

## 2017-05-03 PROCEDURE — 25000003 PHARM REV CODE 250: Mod: TXP | Performed by: INTERNAL MEDICINE

## 2017-05-03 PROCEDURE — 88305 TISSUE EXAM BY PATHOLOGIST: CPT | Mod: NTX | Performed by: PATHOLOGY

## 2017-05-03 PROCEDURE — 27201012 HC FORCEPS, HOT/COLD, DISP: Mod: TXP | Performed by: INTERNAL MEDICINE

## 2017-05-03 PROCEDURE — 45385 COLONOSCOPY W/LESION REMOVAL: CPT | Mod: NTX | Performed by: INTERNAL MEDICINE

## 2017-05-03 PROCEDURE — 37000008 HC ANESTHESIA 1ST 15 MINUTES: Mod: NTX | Performed by: INTERNAL MEDICINE

## 2017-05-03 RX ORDER — PROPOFOL 10 MG/ML
VIAL (ML) INTRAVENOUS CONTINUOUS PRN
Status: DISCONTINUED | OUTPATIENT
Start: 2017-05-03 | End: 2017-05-03

## 2017-05-03 RX ORDER — LIDOCAINE HCL/PF 100 MG/5ML
SYRINGE (ML) INTRAVENOUS
Status: DISCONTINUED | OUTPATIENT
Start: 2017-05-03 | End: 2017-05-03

## 2017-05-03 RX ORDER — SODIUM CHLORIDE 9 MG/ML
INJECTION, SOLUTION INTRAVENOUS CONTINUOUS
Status: DISCONTINUED | OUTPATIENT
Start: 2017-05-03 | End: 2017-05-03 | Stop reason: HOSPADM

## 2017-05-03 RX ORDER — PROPOFOL 10 MG/ML
VIAL (ML) INTRAVENOUS
Status: DISCONTINUED | OUTPATIENT
Start: 2017-05-03 | End: 2017-05-03

## 2017-05-03 RX ADMIN — PROPOFOL 150 MCG/KG/MIN: 10 INJECTION, EMULSION INTRAVENOUS at 09:05

## 2017-05-03 RX ADMIN — PROPOFOL 100 MG: 10 INJECTION, EMULSION INTRAVENOUS at 09:05

## 2017-05-03 RX ADMIN — LIDOCAINE HYDROCHLORIDE 100 MG: 20 INJECTION, SOLUTION INTRAVENOUS at 09:05

## 2017-05-03 RX ADMIN — SODIUM CHLORIDE: 0.9 INJECTION, SOLUTION INTRAVENOUS at 09:05

## 2017-05-03 NOTE — ANESTHESIA PREPROCEDURE EVALUATION
05/03/2017  Haroldo Dickinson is a 41 y.o., male.    Anesthesia Evaluation    I have reviewed the Patient Summary Reports.        Review of Systems  Anesthesia Hx:   Denies Personal Hx of Anesthesia complications.   Cardiovascular:   Hypertension Past MI     Pulmonary:   Pneumonia    Renal/:   Chronic Renal Disease    Neurological:   CVA    Endocrine:   Diabetes        Physical Exam  General:  Malnutrition    Airway/Jaw/Neck:  Airway Findings: Mouth Opening: Normal Tongue: Normal  General Airway Assessment: Adult  Mallampati: III  Improves to III with phonation.  TM Distance: Normal, at least 6 cm      Dental:  Dental Findings: In tact   Chest/Lungs:  Chest/Lungs Clear    Heart/Vascular:  Heart Findings: Normal            Anesthesia Plan  Type of Anesthesia, risks & benefits discussed:  Anesthesia Type:  general  Patient's Preference:   Intra-op Monitoring Plan:   Intra-op Monitoring Plan Comments:   Post Op Pain Control Plan:   Post Op Pain Control Plan Comments:   Induction:   IV  Beta Blocker:  Patient is on a Beta-Blocker and has received one dose within the past 24 hours (No further documentation required).       Informed Consent: Patient understands risks and agrees with Anesthesia plan.  Questions answered. Anesthesia consent signed with patient.  ASA Score: 4     Day of Surgery Review of History & Physical:  There are no significant changes.          Ready For Surgery From Anesthesia Perspective.

## 2017-05-03 NOTE — ANESTHESIA POSTPROCEDURE EVALUATION
"Anesthesia Post Evaluation    Patient: Haroldo Dickinson    Procedure(s) Performed: Procedure(s) (LRB):  ESOPHAGOGASTRODUODENOSCOPY (EGD) (N/A)  COLONOSCOPY (N/A)    Final Anesthesia Type: general  Patient location during evaluation: PACU  Patient participation: Yes- Able to Participate  Level of consciousness: awake and alert  Post-procedure vital signs: reviewed and stable  Pain management: adequate  Airway patency: patent  PONV status at discharge: No PONV  Anesthetic complications: no      Cardiovascular status: blood pressure returned to baseline  Respiratory status: unassisted  Hydration status: euvolemic  Follow-up not needed.        Visit Vitals    BP (!) 107/56    Pulse (!) 59    Temp 36.4 °C (97.6 °F)    Resp 20    Ht 5' 7" (1.702 m)    Wt 73.5 kg (162 lb)    SpO2 100%    BMI 25.37 kg/m2       Pain/Marialuisa Score: Pain Assessment Performed: Yes (5/3/2017  9:59 AM)  Presence of Pain: non-verbal indicators absent (5/3/2017  9:59 AM)  Marialuisa Score: 10 (5/3/2017  9:58 AM)      "

## 2017-05-03 NOTE — PATIENT INSTRUCTIONS
Discharge Summary/Instructions for after EGD with Biopsy  Patient Name: Haroldo Dickinson  Patient MRN: [id number]  Patient YOB: 1975  Wednesday, May 03, 2017    Rufino Carpenter MD  RESTRICTIONS ON ACTIVITY:  - DO NOT drive a car or operate machinery until the day after the   procedure.  - Following Day:  Return to full activities including work.  - Diet:  Eat and drink normally unless instructed otherwise.  TREATMENT FOR COMMON SIDE EFFECTS:  - Sore Throat - treat with throat lozenges, gargle with warm salt water.  - Mild abdominal pain & bloating - rest and take liquids only.  SYMPTOMS TO WATCH FOR AND REPORT TO YOUR PHYSICIAN:  1.  Chills or fever occurring within 24 hours after procedure.  2.  Pain in chest.  3.  SEVERE abdominal pain or bloating.  4.  Rectal bleeding which would show as maroon or black stools.  Your doctor recommends these additional instructions:  If any biopsies were performed, my office will call you in 5 to 6 business   days with any results.  - Discharge patient to home.   - Await pathology results.   - Perform a colonoscopy today.   - The findings and recommendations were discussed with the designated   responsible adult.   You are being discharged to home.   We are waiting for your pathology results.   Your physician has recommended a colonoscopy today.   The findings and recommendations were discussed with your designated   responsible adult.  Discharge patient to home.   Await pathology results.   Perform a colonoscopy today.   The findings and recommendations were discussed with the designated   responsible adult.  If you have any questions or problems, please call your physician.  EMERGENCY PHONE NUMBER: [phone]  LAB RESULTS: [phone]         Rufino Carpenter MD  5/3/2017 9:26:02 AM  This report has been verified and signed electronically.

## 2017-05-03 NOTE — DISCHARGE INSTRUCTIONS

## 2017-05-03 NOTE — PATIENT INSTRUCTIONS
Discharge Summary/Instructions for after Colonoscopy with   Biopsy/Polypectomy  Patient Name: Haroldo Dickinson  Patient MRN: [id number]  Patient YOB: 1975  Wednesday, May 03, 2017    Rufino Carpenter MD  RESTRICTIONS ON ACTIVITY:  - Do not drive a car or operate machinery until the day after the procedure.      - The following day: return to full activity including work.  - For  3 days: No heavy lifting, straining or running.  - Diet: Eat and drink normally unless instructed otherwise.  TREATMENT FOR COMMON SIDE EFFECTS:  - Mild abdominal pain and bloating or excessive gas: rest, eat lightly and   use a heating pad.  SYMPTOMS TO WATCH FOR AND REPORT TO YOUR PHYSICIAN:  1. Severe abdominal pain.  2. Fever within 24 hours after a procedure.  3. A large amount of rectal bleeding. (A small amount of blood from the   rectum is not serious, especially if hemorrhoids are present.  4. Because air was put into your colon during the procedure, expelling large   amounts of air from your rectum is normal.  5. You may not have a bowel movement for 1-3 days because of the colonoscopy   prep.  This is normal.  6. Go directly to the emergency room if you notice any of the following:   Chills and/or fever over 101   Persistent vomiting   Severe abdominal pain, other than gas cramps   Severe chest pain   Black, tarry stools   Any bleeding - exceeding one tablespoon  Your doctor recommends these additional instructions:  If any biopsies were performed, my office will call you in 5 to 6 business   days with any results.  - Patient has a contact number available for emergencies.  The signs and   symptoms of potential delayed complications were discussed with the   patient.  Return to normal activities tomorrow.  Written discharge   instructions were provided to the patient.   - Discharge patient to home.   - Await pathology results.   - Repeat colonoscopy in 3 years for surveillance.   - The findings and recommendations were discussed  with the designated   responsible adult.  You have a contact number available for emergencies.  The signs and symptoms   of potential delayed complications were discussed with you.  You may return   to normal activities tomorrow.  Written discharge instructions were   provided to you.   You are being discharged to home.   We are waiting for your pathology results.   Your physician has recommended a repeat colonoscopy in three years for   surveillance.   The findings and recommendations were discussed with your designated   responsible adult.  Patient has a contact number available for emergencies.  The signs and   symptoms of potential delayed complications were discussed with the   patient.  Return to normal activities tomorrow.  Written discharge   instructions were provided to the patient.   Discharge patient to home.   Await pathology results.   Repeat colonoscopy in 3 years for surveillance.   The findings and recommendations were discussed with the designated   responsible adult.  If you have any questions or problems, please call your physician - Rufino Carpenter MD at .    LAB RESULTS: [phone]  [facility], EMERGENCY ROOM PHONE NUMBER: [phone]  IF A COMPLICATION OR EMERGENCY SITUATION ARISES AND YOU ARE UNABLE TO REACH   YOUR PHYSICIAN - GO TO THE EMERGENCY ROOM.  Rufino Carpenter MD  5/3/2017 9:50:10 AM  This report has been verified and signed electronically.

## 2017-05-03 NOTE — H&P
Short Stay Endoscopy History and Physical    PCP - Montse Liu NP    Procedure - EGD/Colonoscopy  ASA - per anesthesia  Mallampati - per anesthesia  History of Anesthesia problems - no  Family history Anesthesia problems -  no   Plan of anesthesia - MAC    HPI:  This is a 41 y.o. 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    Surgical History:  has a past surgical history that includes Dialysis fistula creation and Vascular surgery.    Family History: family history includes Diabetes in his mother; Heart disease in his brother; Hypertension in his brother, father, mother, and sister; Kidney disease in his brother. There is no history of Cancer, Colon cancer, Esophageal cancer, Stomach cancer, Rectal cancer, Ulcerative colitis, Irritable bowel syndrome, Crohn's disease, or Celiac disease.. Otherwise no colon cancer, inflammatory bowel disease, or GI malignancies.    Social History:  reports that he has never smoked. He has never used smokeless tobacco. He reports that he does not drink alcohol or use illicit drugs.    Review of patient's allergies indicates:   Allergen Reactions    No known allergies        Medications:   Prescriptions Prior to Admission   Medication Sig Dispense Refill Last Dose    amlodipine (NORVASC) 10 MG tablet    5/3/2017 at Unknown time    aspirin 81 MG Chew Take 1 tablet (81 mg  total) by mouth once daily. 90 tablet 0 5/2/2017 at Unknown time    metoprolol tartrate (LOPRESSOR) 25 MG tablet Take 25 mg by mouth 2 (two) times daily.  0 5/3/2017 at Unknown time    PRORENAL 8 mg iron-800 mcg-1,000 unit Tab Take 1 tablet by mouth once daily.  11 Past Week at Unknown time    RENVELA 800 mg Tab 800 mg 3 (three) times daily with meals.    Past Week at Unknown time    SENSIPAR 30 mg Tab Take 30 mg by mouth before dinner.  11 Past Week at Unknown time    amlodipine (NORVASC) 5 MG tablet Take 1 tablet (5 mg total) by mouth 2 (two) times daily. 180 tablet 3 Unknown at Unknown time    dorzolamide (TRUSOPT) 2 % ophthalmic solution    Unknown at Unknown time    latanoprost 0.005 % ophthalmic solution    Unknown at Unknown time    lidocaine-prilocaine (EMLA) cream APPLY SMALL AMOUNT TO ACCESS SITE 1 TO 2 HOURS BEFORE TREATMENT. COVER AREA WITH AN OCCLUSIVE DRESSING.  6 Unknown at Unknown time    metoprolol succinate (TOPROL-XL) 50 MG 24 hr tablet    Unknown at Unknown time       Physical Exam:    Vital Signs:   Vitals:    05/03/17 0847   BP: (!) 153/88   Pulse: 65   Resp: 16   Temp: 98.3 °F (36.8 °C)       General Appearance: Well appearing in no acute distress  Eyes:    No scleral icterus  ENT: Neck supple, Lips, mucosa, and tongue normal; teeth and gums normal  Lungs: CTA anteriorly  Heart:  Regular rate, S1, S2 normal, no murmurs heard.  Abdomen: Soft, non tender, non distended with normal bowel sounds. No hepatosplenomegaly, ascites, or mass.  Extremities: No edema  Skin: No rash    Labs:  Lab Results   Component Value Date    WBC 8.98 03/04/2017    HGB 8.2 (L) 03/30/2017    HCT 23.7 (L) 03/04/2017     (H) 03/04/2017    CHOL 160 06/05/2014    TRIG 57 06/05/2014    HDL 58 06/05/2014    ALT 10 03/04/2017    AST 12 03/04/2017     03/04/2017    K 4.9 05/03/2017    CL 96 03/04/2017    CREATININE 6.0 (H) 03/04/2017    BUN 17 03/04/2017    CO2 32 (H) 03/04/2017    TSH 0.989 10/13/2015     PSA 0.67 02/14/2017    INR 1.0 03/04/2017    HGBA1C 4.4 (L) 06/05/2014       I have explained the risks and benefits of endoscopy procedures to the patient including but not limited to bleeding, perforation, infection, and death.      Rufino Carpenter MD

## 2017-05-03 NOTE — TRANSFER OF CARE
"Anesthesia Transfer of Care Note    Patient: Haroldo Dickinson    Procedure(s) Performed: Procedure(s) (LRB):  ESOPHAGOGASTRODUODENOSCOPY (EGD) (N/A)  COLONOSCOPY (N/A)    Patient location: GI    Anesthesia Type: general    Transport from OR: Transported from OR on room air with adequate spontaneous ventilation    Post pain: adequate analgesia    Post assessment: no apparent anesthetic complications and tolerated procedure well    Post vital signs: stable    Level of consciousness: awake and alert    Nausea/Vomiting: no vomiting    Complications: none          Last vitals:   Visit Vitals    BP (!) 115/59    Pulse 69    Temp 36.8 °C (98.3 °F) (Oral)    Resp 16    Ht 5' 7" (1.702 m)    Wt 73.5 kg (162 lb)    SpO2 98%    BMI 25.37 kg/m2     "

## 2017-05-03 NOTE — IP AVS SNAPSHOT
[facility]  [address]  [facility] [address]  Phone: [phone]           Patient Discharge Instructions   Our goal is to set you up for success. This packet includes information on your condition, medications, and your home care.  It will help you care for yourself to prevent having to return to the hospital.     Please ask your nurse if you have any questions.      There are many details to remember when preparing to leave the hospital. Here is what you will need to do:    1. Take your medicine. If you are prescribed medications, review your Medication List on the following pages. You may have new medications to  at the pharmacy and others that you'll need to stop taking. Review the instructions for how and when to take your medications. Talk with your doctor or nurses if you are unsure of what to do.     2. Go to your follow-up appointments. Specific follow-up information is listed in the following pages. Your may be contacted by a nurse or clinical provider about future appointments. Be sure we have all of the phone numbers to reach you. Please contact your provider's office if you are unable to make an appointment.     3. Watch for warning signs. Your doctor or nurse will give you detailed warning signs to watch for and when to call for assistance. These instructions may also include educational information about your condition. If you experience any of warning signs to your health, call your doctor.           [facility]  Unless otherwise directed by your provider, please   contact [facility], our nurse care line   that is available for 24/7 assistance.     [phone] (toll-free)     Registered nurses in the [facility]   provide: appointment scheduling, clinical advisement, health education, and other advisory services.                  ** Verify the list of medication(s) below is accurate and up to date. Carry this with you in case of  emergency. If your medications have changed, please notify your healthcare provider.             Medication List      CONTINUE taking these medications        Additional Info                      * amlodipine 5 MG tablet   Commonly known as:  NORVASC   Quantity:  180 tablet   Refills:  3   Dose:  5 mg    Instructions:  Take 1 tablet (5 mg total) by mouth 2 (two) times daily.     Begin Date    AM    Noon    PM    Bedtime       * amlodipine 10 MG tablet   Commonly known as:  NORVASC   Refills:  0      Begin Date    AM    Noon    PM    Bedtime       aspirin 81 MG Chew   Quantity:  90 tablet   Refills:  0   Dose:  81 mg    Instructions:  Take 1 tablet (81 mg total) by mouth once daily.     Begin Date    AM    Noon    PM    Bedtime       dorzolamide 2 % ophthalmic solution   Commonly known as:  TRUSOPT   Refills:  0      Begin Date    AM    Noon    PM    Bedtime       latanoprost 0.005 % ophthalmic solution   Refills:  0      Begin Date    AM    Noon    PM    Bedtime       lidocaine-prilocaine cream   Commonly known as:  EMLA   Refills:  6    Instructions:  APPLY SMALL AMOUNT TO ACCESS SITE 1 TO 2 HOURS BEFORE TREATMENT. COVER AREA WITH AN OCCLUSIVE DRESSING.     Begin Date    AM    Noon    PM    Bedtime       metoprolol succinate 50 MG 24 hr tablet   Commonly known as:  TOPROL-XL   Refills:  0      Begin Date    AM    Noon    PM    Bedtime       metoprolol tartrate 25 MG tablet   Commonly known as:  LOPRESSOR   Refills:  0   Dose:  25 mg    Instructions:  Take 25 mg by mouth 2 (two) times daily.     Begin Date    AM    Noon    PM    Bedtime       PRORENAL 8 mg iron-800 mcg-1,000 unit Tab   Refills:  11   Dose:  1 tablet   Generic drug:  vit B,C-iron fum-FA-D3-zinc ox    Instructions:  Take 1 tablet by mouth once daily.     Begin Date    AM    Noon    PM    Bedtime       RENVELA 800 mg Tab   Refills:  0   Dose:  800 mg   Generic drug:  sevelamer carbonate    Instructions:  800 mg 3 (three) times daily with meals.      Begin Date    AM    Noon    PM    Bedtime       SENSIPAR 30 MG Tab   Refills:  11   Dose:  30 mg   Generic drug:  cinacalcet    Instructions:  Take 30 mg by mouth before dinner.     Begin Date    AM    Noon    PM    Bedtime       * Notice:  This list has 2 medication(s) that are the same as other medications prescribed for you. Read the directions carefully, and ask your doctor or other care provider to review them with you.               Please bring to all follow up appointments:    1. A copy of your discharge instructions.  2. All medicines you are currently taking in their original bottles.  3. Identification and insurance card.    Please arrive 15 minutes ahead of scheduled appointment time.    Please call 24 hours in advance if you must reschedule your appointment and/or time.        Your Scheduled Appointments     May 19, 2017  1:20 PM CDT   Established Patient Visit with Lindsay Vogel NP   [facility] ([facility] )    [address]  [place] [address]   [phone]              Your Future Surgeries/Procedures     May 03, 2017   Surgery with Rufino Carpenter MD   [facility] ([facility])    [address] [facility] [address]   [phone]                Discharge Instructions     Future Orders    Diet general     Questions:    Total calories:      Fat restriction, if any:      Protein restriction, if any:      Na restriction, if any:      Fluid restriction:      Additional restrictions:          Discharge Instructions         Colonoscopy     A camera attached to a flexible tube with a viewing lens is used to take video pictures.     Colonoscopy is a test to view the inside of your lower digestive tract (colon and rectum). Sometimes it can show the last part of the small intestine (ileum). During the test, small pieces of tissue may be removed for testing. This is called a biopsy. Small growths, such as polyps, may also be  removed.   Why is colonoscopy done?  The test is done to help look for colon cancer. And it can help find the source of abdominal pain, bleeding, and changes in bowel habits. It may be needed once a year, depending on factors such as your:  · Age  · Health history  · Family health history  · Symptoms  · Results from any prior colonoscopy  Risks and possible complications  These include:  · Bleeding               · A puncture or tear in the colon   · Risks of anesthesia  · A cancer lesion not being seen  Getting ready   To prepare for the test:  · Talk with your healthcare provider about the risks of the test (see below). Also ask your healthcare provider about alternatives to the test.  · Tell your healthcare provider about any medicines you take. Also tell him or her about any health conditions you may have.  · Make sure your rectum and colon are empty for the test. Follow the diet and bowel prep instructions exactly. If you dont, the test may need to be rescheduled.  · Plan for a friend or family member to drive you home after the test.     Colonoscopy provides an inside view of the entire colon.     You may discuss the results with your doctor right away or at a future visit.  During the test   The test is usually done in the hospital on an outpatient basis. This means you go home the same day. The procedure takes about 30 minutes. During that time:  · You are given relaxing (sedating) medicine through an IV line. You may be drowsy, or fully asleep.  · The healthcare provider will first give you a physical exam to check for anal and rectal problems.  · Then the anus is lubricated and the scope inserted.  · If you are awake, you may have a feeling similar to needing to have a bowel movement. You may also feel pressure as air is pumped into the colon. Its OK to pass gas during the procedure.  · Biopsy, polyp removal, or other treatments may be done during the test.  After the test   You may have gas right after  the test. It can help to try to pass it to help prevent later bloating. Your healthcare provider may discuss the results with you right away. Or you may need to schedule a follow-up visit to talk about the results. After the test, you can go back to your normal eating and other activities. You may be tired from the sedation and need to rest for a few hours.  Date Last Reviewed: 11/1/2016 © 2000-2016 [employer] [address] [place] [address], [place] [address]. All rights reserved. This information is not intended as a substitute for professional medical care. Always follow your healthcare professional's instructions.        Upper GI Endoscopy     During endoscopy, a long, flexible tube is used to view the inside of your upper GI tract.      Upper GI endoscopy allows your healthcare provider to look directly into the beginning of your gastrointestinal (GI) tract. The esophagus, stomach, and duodenum (the first part of the small intestine) make up the upper GI tract.   Before the exam  Follow these and any other instructions you are given before your endoscopy. If you dont follow the healthcare providers instructions carefully, the test may need to be canceled or done over:  · Don't eat or drink anything after midnight the night before your exam. If your exam is in the afternoon, drink only clear liquids in the morning. Don't eat or drink anything for 8 hours before the exam. In some cases, you may be able to take medicines with sips of water until 2 hours before the procedure. Speak with your healthcare provider about this.   · Bring your X-rays and any other test results you have.  · Because you will be sedated, arrange for an adult to drive you home after the exam.  · Tell your healthcare provider before the exam if you are taking any medicines or have any medical problems.  The procedure  Here is what to expect:  · You will lie on the endoscopy table. Usually patients lie on the left side.  · You will  be monitored and given oxygen.  · Your throat may be numbed with a spray or gargle. You are given medicine through an intravenous (IV) line that will help you relax and remain comfortable. You may be awake or asleep during the procedure.  · The healthcare provider will put the endoscope in your mouth and down your esophagus. It is thinner than most pieces of food that you swallow. It will not affect your breathing. The medicine helps keep you from gagging.  · Air is put into your GI tract to expand it. It can make you burp.  · During the procedure, the healthcare provider can take biopsies (tissue samples), remove abnormalities, such as polyps, or treat abnormalities through a variety of devices placed through the endoscope. You will not feel this.   · The endoscope carries images of your upper GI tract to a video screen. If you are awake, you may be able to look at the images.  · After the procedure is done, you will rest for a time. An adult must drive you home.  When to call your healthcare provider  Contact your healthcare provider if you have:  · Black or tarry stools, or blood in your stool  · Fever  · Pain in your belly that does not go away  · Nausea and vomiting, or vomiting blood   Date Last Reviewed: 7/1/2016  © 4219-0351 [employer]. [address] [place] [address], [place] [address]. All rights reserved. This information is not intended as a substitute for professional medical care. Always follow your healthcare professional's instructions.          Instructions    Discharge Summary/Instructions for after EGD with Biopsy  Patient Name: Haroldo Dickinson  Patient MRN: [id number]  Patient YOB: 1975  Wednesday, May 03, 2017    Rufino Carpenter MD  RESTRICTIONS ON ACTIVITY:  - DO NOT drive a car or operate machinery until the day after the   procedure.  - Following Day:  Return to full activities including work.  - Diet:  Eat and drink normally unless instructed otherwise.  TREATMENT FOR COMMON SIDE  "EFFECTS:  - Sore Throat - treat with throat lozenges, gargle with warm salt water.  - Mild abdominal pain & bloating - rest and take liquids only.  SYMPTOMS TO WATCH FOR AND REPORT TO YOUR PHYSICIAN:  1.  Chills or fever occurring within 24 hours after procedure.  2.  Pain in chest.  3.  SEVERE abdominal pain or bloating.  4.  Rectal bleeding which would show as maroon or black stools.  Your doctor recommends these additional instructions:  If any biopsies were performed, my office will call you in 5 to 6 business   days with any results.  - Discharge patient to home.   - Await pathology results.   - Perform a colonoscopy today.   - The findings and recommendations were discussed with the designated   responsible adult.   You are being discharged to home.   We are waiting for your pathology results.   Your physician has recommended a colonoscopy today.   The findings and recommendations were discussed with your designated   responsible adult.  Discharge patient to home.   Await pathology results.   Perform a colonoscopy today.   The findings and recommendations were discussed with the designated   responsible adult.  If you have any questions or problems, please call your physician.  EMERGENCY PHONE NUMBER: [phone]  LAB RESULTS: [phone]         Rufino Carpenter MD  5/3/2017 9:26:02 AM  This report has been verified and signed electronically.         Admission Information     Date & Time Provider Department CSN    5/3/2017  8:16 AM Werner Serrato MD [facility] [id number]      Care Providers     Provider Role Specialty Primary office phone    Werner Serrato MD Attending Provider Gastroenterology [phone]    Rufino Carpenter MD Surgeon  Gastroenterology [phone]      Your Vitals Were     BP Pulse Temp Resp Height Weight    115/59 69 98.3 °F (36.8 °C) (Oral) 16 5' 7" (1.702 m) 73.5 kg (162 lb)    SpO2 BMI             98% 25.37 kg/m2         Recent Lab Values        12/16/2013 6/5/2014 "                        3:45 AM  7:35 AM          A1C 5.9 4.4 (L)                     Pending Labs     Order Current Status    Specimen to Pathology - Surgery Collected (05/03/17 0945)      Allergies as of 5/3/2017        Reactions    No Known Allergies       Advance Directives     An advance directive is a document which, in the event you are no longer able to make decisions for yourself, tells your healthcare team what kind of treatment you do or do not want to receive, or who you would like to make those decisions for you.  If you do not currently have an advance directive, [facility] encourages you to create one.  For more information call:  [phone]), [phone] ([phone]),  or log on to [email or web address].        Language Assistance Services     ATTENTION: Language assistance services are available, free of charge. Please call [phone].      ATENCIÓN: Si habla español, tiene a loya disposición servicios gratuitos de asistencia lingüística. Llame al [phone].     CHÚ Ý: N?u b?n nói Ti?ng Vi?t, có các d?ch v? h? tr? ngôn ng? mi?n phí dành cho b?n. G?i s? [phone].        Stroke Education              Pneumonmia Discharge Instructions                Chronic Kindey Disease Education             Diabetes Discharge Instructions                                   [facility] Sign-Up     Activating your [facility] account is as easy as 1-2-3!     1) Visit [email or web address], select Sign Up Now, enter this activation code and your date of birth, then select Next.  [email or web address]  Expires: 6/17/2017 10:01 AM      2) Create a username and password to use when you visit [facility] in the future and select a security question in case you lose your password and select Next.    3) Enter your e-mail address and click Sign Up!    Additional Information  If you have questions, please e-mail [email or web address] or call [phone] to talk to our [facility] staff. Remember, [facility] is NOT  to be used for urgent needs. For medical emergencies, dial 911.          [facility] complies with applicable Federal civil rights laws and does not discriminate on the basis of race, color, national origin, age, disability, or sex.

## 2017-05-10 ENCOUNTER — TELEPHONE (OUTPATIENT)
Dept: ENDOSCOPY | Facility: HOSPITAL | Age: 42
End: 2017-05-10

## 2017-05-15 ENCOUNTER — INITIAL CONSULT (OUTPATIENT)
Dept: NEUROLOGY | Facility: CLINIC | Age: 42
End: 2017-05-15
Payer: MEDICARE

## 2017-05-15 DIAGNOSIS — R41.3 MEMORY IMPAIRMENT: ICD-10-CM

## 2017-05-15 DIAGNOSIS — F43.21 ADJUSTMENT DISORDER WITH DEPRESSED MOOD: ICD-10-CM

## 2017-05-15 PROCEDURE — 96118 PR NEUROPSYCH TESTING BY PSYCH/PHYS: CPT | Mod: PBBFAC,TXP | Performed by: CLINICAL NEUROPSYCHOLOGIST

## 2017-05-15 PROCEDURE — 90791 PSYCH DIAGNOSTIC EVALUATION: CPT | Mod: PBBFAC,TXP | Performed by: CLINICAL NEUROPSYCHOLOGIST

## 2017-05-15 PROCEDURE — 90791 PSYCH DIAGNOSTIC EVALUATION: CPT | Mod: 59,S$PBB,TXP, | Performed by: CLINICAL NEUROPSYCHOLOGIST

## 2017-05-15 PROCEDURE — 99499 UNLISTED E&M SERVICE: CPT | Mod: S$PBB,TXP,, | Performed by: CLINICAL NEUROPSYCHOLOGIST

## 2017-05-15 PROCEDURE — 96118 PR NEUROPSYCH TESTING BY PSYCH/PHYS: CPT | Mod: S$PBB,TXP,, | Performed by: CLINICAL NEUROPSYCHOLOGIST

## 2017-05-15 NOTE — LETTER
May 15, 2017        Montse Liu, ESPERANZA  [address] [facility] [address]             [facility]  [address]  [place] [address]  Phone: [phone]   Patient: Haroldo Dickinson   MR Number: [id number]   YOB: 1975   Date of Visit: 5/15/2017       Dear Dr. Liu:    Thank you for referring Haroldo Dickinson to me for evaluation. Attached you will find relevant portions of my assessment and plan of care.    If you have questions, please do not hesitate to call me. I look forward to following Haroldo Dickinson along with you.    Sincerely,      RAY Coburn II, PhD            CC  No Recipients    Enclosure

## 2017-05-15 NOTE — PROGRESS NOTES
"Outpatient Neuropsychological Evaluation    Referral Information  Name: Haroldo Dickinson  MRN: [id number]  OSEGUERA: 05/15/2017  : 1975  Age: 41 y.o.  Handedness: Right  Race: Black or   Gender: male  Referring Provider: Cordelia Singer Np  [address] [facility] [place] [address]  Referral Reason/Medical Necessity: Patient has multiple vascular risk factors, ESRD on T/Th/S dialysis, is s/p CVA vs encephalopathy due to sepsis in  with residual right weakness and cognitive impairment, and is a kidney transplant candidate. He is denying significant cognitive concerns, but lives with a grandmother who provides significant assistance. His treatment providers have observed persistent cognitive difficulties and he received an initial neuropsychological exam in  (review below) that noted significant cognitive impairment. He was referred for a neuropsychological re-evaluation to document his current cognitive status, for differential diagnosis, treatment recommendations, and to comment on any risk factors prior to transplantation.   Billing:Total licensed neuropsychologists professional time includes: clinical interview (25619: 60-minutes), test administration and interpretation of tests administered by the billing neuropsychologist, integration of test results and other clinical data, preparing the final report, and personally reporting results to the patient ([id number])= 4 hours  Consent: The patient expressed an understanding of the purpose of the evaluation and consented to all procedures.    Current Symptoms/HPI  Per 2014 Neuropsychological Exam, "Mr. Dickinson was admitted to [facility] on 12/15/2013 for a cough lasting 3 weeks and was diagnosed with hypertensive emergency, acute influenza A with sepsis, acute systolic heart failure and acute kidney injury subsequently requiring hemodialysis. He had altered mental status. He was suspected to have encephalopathy versus stroke. CT scan of " "the head was suspicious for remote infarcts in the brain; however, an MRI could not be obtained due to metallic object in his body. He and his family report his cognition was not impaired prior to this episode of illness. The patient and his family also denied current cognitive or memory impairment. They report his only impairment is visual due to retinal problems. He is also still weak on the let side and was using a walker at the appointment. He denied prior psychiatric history or current adjustment problems. He offered no spontaneous speech in interview and answered most questions with one or two words. His family reported he has always been quiet."    Following the neuropsychological exam, he evidenced significant cognitive impairment across most domains assessed. The neuropsychologist stated, "Neuropsychological testing revealed impairment in immediate and delayed auditory/verbal memory, immediate and delayed visual memory, visuospatial/constructional abilities, attention, temporal orientation, and verbal fluency; and variability in naming (performances in the average and impaired ranges). The pattern suggests vascular dementia, that the impairment is related to right CVA. These data clearly suggest that he is not capable of managing his medical care on his own but will need supervision and consistent involvement from a family member to assure understanding and compliance."    Three years have since passed and the patient continues to receive support from his grandmother and mother. He denied any significant cognitive concerns today and this is consistent with his lack of understanding/awareness of his cognitive deficits.     Current Cognitive Sxs:  · Attention: Denied any problems or concerns.  · Mental Speed: Denied any problems or concerns.  · Memory: Denied any problems or concerns.   · Language: Denied any problems or concerns.  · Visuospatial/Perceptual: Denied any problems or concerns.  · Executive " "Functioning: Denied any problems or concerns.    [Onset/Course]: Review above for onset/course. Family was not available for collateral interview as this would have been helpful since patient has minimal insight into the extent of his cognitive dysfunction.    Neuropsychiatric Sxs:  · Mood: "Mood been all right." He reported improved mood over the last year. Occasionally, he will get irritable when he interacts with his ex-wife. Denied any anxiety.  · Neurovegetative:  · Sleep: Falls asleep normally at 11pm and has been recently been waking up at 4am unable to fall back asleep. He does not nap during the day. No sleep apnea or snoring.  · Appetite: "Real good"  · Energy: "Real low" for several years 2/2 health status  · Behavioral Concerns: None  · Delusions: None  · Hallucinations: Denied  · SI/HI: Denied    Physical  · Motor: General walking is fine with a quad cane; right-sided weakness since his stroke; has trouble with longer distance walking 2/2 fatigue    · Sensory: Some vision difficulties 2/2 diabetic retinopathy  · Pain: None today    Current Functioning (I/ADLs):  · ADLs: Independent  · IADLs: Needs significant assistance  · Finances:Independent  · Medication Mgmt: Independent; grandmother monitors refills; he keeps track of appointments  · Driving: Stopped driving 4 years ago 2/2 stroke in 2013  · Household Mgmt: Grandmother handles most tasks      Family History   Problem Relation Age of Onset    Hypertension Mother     Diabetes Mother     Hypertension Father     Hypertension Sister     Kidney disease Brother     Hypertension Brother     Heart disease Brother     Cancer Neg Hx     Colon cancer Neg Hx     Esophageal cancer Neg Hx     Stomach cancer Neg Hx     Rectal cancer Neg Hx     Ulcerative colitis Neg Hx     Irritable bowel syndrome Neg Hx     Crohn's disease Neg Hx     Celiac disease Neg Hx      Family Neurologic History: Negative for heritable risk factors  Family Psychiatric " History: Negative for heritable risk factors    Developmental/Academic Hx:  Developmental: No gestational or later developmental concerns.  Academic:  · Learning Difficulties: None  · Attention Difficulties: None  · Behavioral Difficulties: None  · Educational Attainment: HS (Bs/Cs) + 1 year or so at a community college    Social/Occupational Hx:  Social:  · Current Relationship/Family Status:  from his wife 2-years ago and they were previously  3-years; one son (13yo) who lives with his biological mom;   · Primary Source of Support:Grandmother, mother  · Current Hobbies: Tu/Th/Sat dialysis  · Stressors: Some communication difficulties with his spouse    Occupational Hx:  · Occupational Status: Disabled in 2013 for his stroke ([employer] and knows how much gets)  · 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    MEDICAL HISTORY  Patient Active Problem List   Diagnosis    Anemia    Malignant hypertension    Controlled type 2 diabetes mellitus with CKD    CVA (cerebral infarction)    NSTEMI (non-ST elevated myocardial infarction)    Cerebral microvascular disease    ESRD from DM/HTN started dialysis 01/2014    Organ transplant candidate    Hyperparathyroidism, secondary renal    Pre-transplant evaluation for chronic kidney disease    Left ventricular hypertrophy due to hypertensive disease    Stenosis of arteriovenous dialysis fistula    Hemiparesis affecting right side as late effect of cerebrovascular accident    Abnormality of gait following cerebrovascular accident (CVA)    Renal hypertension    Persistent cognitive impairment    Iron deficiency anemia     Past Medical History:   Diagnosis Date    Diabetes mellitus     Dialysis patient     DM type 2 causing renal disease, not at goal     ESRD (end stage renal disease) started dialysis 01/2014 6/5/2014    Hyperparathyroidism, secondary renal 6/5/2014    Hypertension     NSTEMI (non-ST elevated myocardial infarction)  "12/21/2013    Organ transplant candidate 6/5/2014    Pneumonia     Renal hypertension     Stroke      Past Surgical History:   Procedure Laterality Date    COLONOSCOPY N/A 5/3/2017    Procedure: COLONOSCOPY;  Surgeon: Rufino Carpenter MD;  Location: [facility] ([address] [place] [address]);  Service: Endoscopy;  Laterality: N/A;  pt states can only schedule on Wednesdays    DIALYSIS FISTULA CREATION      VASCULAR SURGERY         Neurologic History  · TBI: None  · Seizures: None  · Stroke: Possible  · 2013: Discharge summary noted the following: "AMS thought to be due to decreased cerebral perfusion pressure after tight BP control (no acute CVA on CT), slowly improving. Repeat CT head showed remote infarctions, MRI unobtainable due to metal fragments in body . Neurology consulted; EEG showed R sided slowing . Stroke service consulted and recs carotid u/s; performed 12/28 that showed 1-39% stenosis bilaterally. Hypercoag Panel: homocystein neg, Factor V 173 (H), cardiolipin wnl, antiTIII, factor 8 pending. Echo with bubble study performed; showed no shunts. Will follow-up with Neurology as an outpatient."    · Movement Disorder: None      Lab Results   Component Value Date    [id number] 790 12/16/2013     Lab Results   Component Value Date    RPR Non-reactive 06/05/2014     Lab Results   Component Value Date    FOLATE 10.9 12/16/2013     Lab Results   Component Value Date    TSH 0.989 10/13/2015     Lab Results   Component Value Date    HGBA1C 4.4 (L) 06/05/2014     Lab Results   Component Value Date    [id number] Negative 06/05/2014       Neurodiagnostics  2013 CT Head: 1. Scattered areas of hypoattenuation in the right anterior limb internal capsule, right centrum semi-ovale, and left supratentorial white matter of the parietal lobe. These areas could represent areas of infarction in the setting of chronic microvascular disease. Clinical correlation with symptomatology recommended, with further imaging as warranted. 2. No definite " "hemorrhage is appreciated, noting limitation above    2013 EEG: "IMPRESSION: This is an abnormal EEG, generally slow with a suggestion of   additional focal slowing over the right cerebral hemisphere."     Current Outpatient Prescriptions:     amlodipine (NORVASC) 10 MG tablet, , Disp: , Rfl:     amlodipine (NORVASC) 5 MG tablet, Take 1 tablet (5 mg total) by mouth 2 (two) times daily., Disp: 180 tablet, Rfl: 3    aspirin 81 MG Chew, Take 1 tablet (81 mg total) by mouth once daily., Disp: 90 tablet, Rfl: 0    dorzolamide (TRUSOPT) 2 % ophthalmic solution, , Disp: , Rfl:     latanoprost 0.005 % ophthalmic solution, , Disp: , Rfl:     lidocaine-prilocaine (EMLA) cream, APPLY SMALL AMOUNT TO ACCESS SITE 1 TO 2 HOURS BEFORE TREATMENT. COVER AREA WITH AN OCCLUSIVE DRESSING., Disp: , Rfl: 6    metoprolol succinate (TOPROL-XL) 50 MG 24 hr tablet, , Disp: , Rfl:     metoprolol tartrate (LOPRESSOR) 25 MG tablet, Take 25 mg by mouth 2 (two) times daily., Disp: , Rfl: 0    PRORENAL 8 mg iron-800 mcg-1,000 unit Tab, Take 1 tablet by mouth once daily., Disp: , Rfl: 11    RENVELA 800 mg Tab, 800 mg 3 (three) times daily with meals. , Disp: , Rfl:     SENSIPAR 30 mg Tab, Take 30 mg by mouth before dinner., Disp: , Rfl: 11    Psychiatric Hx:  · Childhood: None  · Adult: Had a prior neuropsychological eval pre-transplant and noted above.    Social History     Social History Main Topics    Smoking status: Never Smoker    Smokeless tobacco: Never Used    Alcohol use No    Drug use: No    Sexual activity: Yes     Partners: Female     Birth control/ protection: None       MENTAL STATUS AND OBSERVATIONS:  APPEARANCE: Casually dressed and adequate grooming/hygiene.   ALERTNESS/ORIENTATION: Attentive and alert. Review MMSE below for orientation  GAIT: Slow and aided with a quad cane; was in a wheelchair for long distance  SENSORY: Patient has some vision difficulties 2/2 retinopathy and this complicates visual task " "assessment  MOTOR MOVEMENTS/MANNERISMS: Unremarkable  SPEECH/LANGUAGE: Normal in rate, rhythm, tone, and volume. No significant word finding difficulty noted. Expressive and receptive language was largely normal.   STATED MOOD/AFFECT: The patients stated mood was "alright" Affect was congruent with stated mood.   INTERPERSONAL BEHAVIOR: Rapport was quickly and easily established   SUICIDALITY/HOMICIDALITY: Denied  HALLUCINATIONS/DELUSIONS: None evidenced or endorsed  THOUGHT PROCESSES: Thoughts seemed logical and goal-directed. He responded to all clinical questions, but appeared below average in terms of intelligence/sophistication. This mirrored pre-morbid estimation with a word reading measure  TEST TAKING BEHAVIOR and VALIDITY: Freestanding and embedded performance validity measures and observation of effort were suggestive of adequate engagement. The current results, therefore, are likely a valid reflection of the patient's current functioning.     PROCEDURES/TESTS ADMINISTERED:  In addition to performing a review of pertinent medical records, reviewing limits to confidentiality, conducting a clinical interview, and explaining procedures, the following measures were administered: Mini-Mental State Examination (MMSE; [place] et al., 1993 norms); Wide Range Achievement Test - Fourth Edition (WRAT-IV; Green Form) Reading Test; Trail Making Test, parts A and B (Mohit et al., 2004 norms); Category and Letter-cued verbal fluency (animal naming/FAS; Mohit et al., 2004 norms); Repeatable Battery for the Assessment of Neuropsychological Status (RBANS: A: Update); WAIS-IV (Digit Span and Similarities subtests); BDI-2. Manual norms were used unless otherwise indicated.      TEST RESULTS  Percentile Interpretation:        </=3rd......................................Abnormal        4th-9th.....................................Borderline Abnormal        10th-24th...................................Low Average        " 25th-74th...................................Average        75th-90th...................................High Average        91st-97th...................................Superior        >/=97th.....................................Very Superior        SCREENING OF GENERAL COGNITIVE FUNCTIONING:    MMSE (total score/%ile):     Total Score (max = 30)...............28 / Impaired  Orientation to time..................4 / 5  Orientation to place.................5 / 5  Repetition...........................0 / 1    RBANS-A: Update  SUBTEST SCORES (raw score/percentile):   List Learning...........................23 / 5th  Story Memory............................14 / 5th  Figure Copy.............................13 / <1st  Line Orientation........................17 / 26-50  Picture Naming...........................9 / 17-25  Semantic Fluency.........................6 / <1st  Digit Span...............................7 / 2nd  Coding...................................8 / <1st  List Recall..............................0 / <=2nd  List Recognition........................20 / 51-75th  Story Recall.............................4 / 1st  Figure Recall............................4 / <1st     INDEX SCORES (standard score/percentile)  Immediate Memory.........................73 / 4th  Visuospatial/Constructional..............69 / 2nd  Language.................................74 / 4th  Attention................................49 / <1st  Delayed Memory...........................74 / 4th  Full Score...............................59 / <1st       ESTIMATED FSIQ and READING LEVEL:      WRAT-4 (Raw/SS/GE)..................38 / 72 / 4th     AUDITORY ATTENTION AND WORKING MEMORY    IPUR-HA-Jrerz Span        Forward raw..........................6 / 5th      Forward span.........................4 /       Backward raw.........................7 / 25th      Backward span........................4 /       Sequencing raw.......................6 / 16th       Sequencing span......................4 /       Overall (SS/percentile)..............6 / 9th          MOTOR AND ORAL PROCESSING SPEED     Trail Making Test (sec. to completion/percentile):        Part A .....................................73 / <1st          Errors..................................0 /       LANGUAGE FUNCTIONING    WORD PRODUCTIVITY    Verbal Fluency Tests (raw/percentiles):        FAS.........................................19 / 3rd      Animals.....................................10 / 1st      EXECUTIVE FUNCTIONING          SET-SHIFTING  Trail Making Test (sec. to completion/%ile):        Part B ......................................343 / <1st          Errors...................................4 /     WORD PRODUCTIVITY    Verbal Fluency Tests (raw/percentiles):        FAS.........................................19 / 3rd      Animals.....................................10 / 1st      VERBAL REASONING  WAIS-IV (scaled score/percentile):        Similarities................................3 / 1st      SELF-REPORTED MOOD  BDI-2...........................................9 / Minimal    OVERALL SUMMARY  Patient has multiple vascular risk factors, ESRD on T/Th/S dialysis, is s/p CVA vs encephalopathy due to sepsis in 2013 with residual right weakness and cognitive impairment, and is a kidney transplant candidate. He is denying significant cognitive concerns, but lives with a grandmother who provides significant assistance. His treatment providers have observed persistent cognitive difficulties and he received an initial neuropsychological exam in 2013 (review below) that noted significant cognitive impairment. He was referred for a neuropsychological re-evaluation to document his current cognitive status, for differential diagnosis, treatment recommendations, and to comment on any risk factors prior to transplantation.     The patient's baseline or pre-morbid intellectual functioning was estimated to be in the  borderline to low-average range based on educational/occupational history and performance on a word reading measure. His reading level is at the 4th grade level. Results should be interpreted in that context. Overall mental status was normal (MMSE=28/30). Attention and working memory were largely below expectations. Mental speed is very very slow. Basic expressive and receptive language were normal on observation. More complex/lengthy verbal comprehension, however, appears impaired and this has important implications for medical discussions. Basic visuoperception was normal despite previously noted and longstanding visual difficulties. More complex visuoconstruction was poor and reflective of inattention to detail and disorganization (e.g., executive dysfunction rather than pure visual difficulties). Learning and memory was impaired. However, recall improved substantially with recognition cues. Executive functioning is impaired across nearly all measures administered for verbal reasoning, set-shifting, fluency, and organization/planning when copying a figure.     Psychiatrically, he is reporting some mild depression and irritability mostly related to his failing health and some interpersonal difficulties with his ex-wife.    · Procedure Understanding/Capacity: Patient has obvious vascular dementia based on current and past testing. On 02/2017, his procedure understanding and capacity was assessed to be intact by the Transplant Team. Will defer to their assessment regarding this domain.   · Ability to Manage Pre/Camila/Post Operative Treatment: Given the patient's cognitive impairment, he will likely have significant challenges managing post-operatively without consistent support and monitoring. The Transplant Team should ensure that his caregivers understand both the extent of the patient's cognitive impairment, his lack of ability to manage on his own without their checking/re-checking and monitoring, and clear  expectations of what is needed from caregivers in his post-operative management.  · Cognitive Considerations: The patient has a Major Vascular Neurocognitive Disorder (Vascular Dementia) with notable impairments for attention, mental speed, memory, and executive functions. His ability to pay attention and focus beyond simple conversations is limited. His ability to understand complex material is also limited. His reading level is at the 4th grade level. Providers should communicate in simple, short phrases, provide easy to understand summaries, involve family in all aspects of his health care, and ensure more frequent visits for optimal monitoring of his health especially in the early stages post-surgery.  · Neuropsychiatric Considerations: He has some mild depression and irritability mostly related to his health.   · Family/Support System: He has a supportive and involved family. He should continue to reside with them for the foreseeable future for ongoing monitoring and support.     Referral Dx:  R41.3 (ICD-10-CM) - Memory loss  Consult Dx:  Major Vascular Neurocognitive Disorder  Adjustment Disorder with Depressed Mood    RAY Coburn II, Ph.D.  Clinical Neuropsychologist  [facility] - Department of Neurology

## 2017-05-16 ENCOUNTER — TELEPHONE (OUTPATIENT)
Dept: TRANSPLANT | Facility: CLINIC | Age: 42
End: 2017-05-16

## 2017-05-17 NOTE — TELEPHONE ENCOUNTER
----- Message from Kaia Barnett sent at 5/16/2017 10:59 AM CDT -----      ----- Message -----     From: Cordelia Singer NP     Sent: 5/15/2017   4:04 PM       To: [facility] Pre-Kidney Transplant Clinical        ----- Message -----     From: RAY Coburn II, PhD     Sent: 5/15/2017   3:39 PM       To: Cordelia Singer NP

## 2017-05-19 ENCOUNTER — OFFICE VISIT (OUTPATIENT)
Dept: CARDIOLOGY | Facility: CLINIC | Age: 42
End: 2017-05-19
Payer: MEDICARE

## 2017-05-19 VITALS
WEIGHT: 159.38 LBS | HEART RATE: 83 BPM | BODY MASS INDEX: 25.01 KG/M2 | DIASTOLIC BLOOD PRESSURE: 85 MMHG | SYSTOLIC BLOOD PRESSURE: 133 MMHG | HEIGHT: 67 IN

## 2017-05-19 DIAGNOSIS — Z99.2 CONTROLLED TYPE 2 DIABETES MELLITUS WITH CHRONIC KIDNEY DISEASE ON CHRONIC DIALYSIS, WITHOUT LONG-TERM CURRENT USE OF INSULIN: Chronic | ICD-10-CM

## 2017-05-19 DIAGNOSIS — N18.6 CONTROLLED TYPE 2 DIABETES MELLITUS WITH CHRONIC KIDNEY DISEASE ON CHRONIC DIALYSIS, WITHOUT LONG-TERM CURRENT USE OF INSULIN: Chronic | ICD-10-CM

## 2017-05-19 DIAGNOSIS — N18.6 ESRD (END STAGE RENAL DISEASE): Chronic | ICD-10-CM

## 2017-05-19 DIAGNOSIS — E11.22 CONTROLLED TYPE 2 DIABETES MELLITUS WITH CHRONIC KIDNEY DISEASE ON CHRONIC DIALYSIS, WITHOUT LONG-TERM CURRENT USE OF INSULIN: Chronic | ICD-10-CM

## 2017-05-19 DIAGNOSIS — I21.4 NSTEMI (NON-ST ELEVATED MYOCARDIAL INFARCTION): ICD-10-CM

## 2017-05-19 DIAGNOSIS — Z01.810 PREOPERATIVE CARDIOVASCULAR EXAMINATION: Primary | ICD-10-CM

## 2017-05-19 DIAGNOSIS — I63.19 CEREBROVASCULAR ACCIDENT (CVA) DUE TO EMBOLISM OF OTHER PRECEREBRAL ARTERY: ICD-10-CM

## 2017-05-19 PROCEDURE — 99214 OFFICE O/P EST MOD 30 MIN: CPT | Mod: S$PBB,TXP,, | Performed by: NURSE PRACTITIONER

## 2017-05-19 PROCEDURE — 99213 OFFICE O/P EST LOW 20 MIN: CPT | Mod: PBBFAC,TXP | Performed by: NURSE PRACTITIONER

## 2017-05-19 PROCEDURE — 99999 PR PBB SHADOW E&M-EST. PATIENT-LVL III: CPT | Mod: PBBFAC,TXP,, | Performed by: NURSE PRACTITIONER

## 2017-05-19 NOTE — PROGRESS NOTES
Mr. Dickinson is a patient of Dr. Villa and was last seen in [facility] 10/13/2015 with Dr. Aguilar.      Subjective:   Patient ID:  Haroldo Dickinson is a 41 y.o. male who presents for follow-up of Pre-op Exam (kidney transplant)  .   HPI:    Mr. Dickinson is a 40yo male with a PMHx of ESRD (on HD), hypertensive crisis with stroke and possible NSTEMI in 2013, HTN, and DM II here for annual follow-up. He has no complaints. Mr. Dickinson denies chest pain with exertion or at rest, palpitations, SOB, OSEGUERA, dizziness, syncope, leg edema, claudication, PND, or orthopnea. He does report insomnia. He was recently experiencing significant anemia which is now resolving post-endoscopy (5/3/2017). Hemoglobin was 8.2 on 3/30/2017 (up from 6.7). He walks with a cane due to some gait disturbance post-CVA in 2013. He says he can ascend a flight of stairs without needing to stop to catch his breath. He is currently taking amlodipine 10mg, metoprolol 25mg BID, and ASA 81mg daily. He does not take his BP at home. He is no longer taking statin but he is uncertain as to why this was discontinued. Last lipids were in June of 2014. LDL was 90.6.     Recent Cardiac Tests:    2D Echo (2/14/2017):  CONCLUSIONS     1 - Concentric hypertrophy.     2 - Normal left ventricular systolic function (EF 60-65%).     3 - Moderate left atrial enlargement.     4 - Left ventricular diastolic dysfunction.     5 - Right ventricular hypertrophy.     6 - Normal right ventricular systolic function .     7 - The estimated PA systolic pressure is 19 mmHg.     8 - Mild tricuspid regurgitation.     EKG (8/23/2016):  Normal sinus rhythm  Minimal voltage criteria for LVH, may be normal variant  ST and/or T wave abnormalities secondary to hypertrophy and/or T wave abnormality, consider lateral ischemia  Borderline Abnormal ECG  When compared with ECG of 01-DEC-2015 09:51,  T wave inversion more evident in Lateral leads    SPECT (12/1/2015):  1.  No evidence of  myocardial ischemia or scar.  2. Preserved left ventricular systolic function with left ventricular ejection fraction of 50%.    DSE (10/22/2015):  CONCLUSIONS     1 - Normal left ventricular systolic function (EF 60-65%).     2 - Concentric hypertrophy.     3 - Normal left ventricular diastolic function.     4 - Mild left atrial enlargement.     5 - Normal right ventricular systolic function .     6 - Trivial mitral regurgitation.     7 - Trivial tricuspid regurgitation.     8 - Trivial pulmonic regurgitation.     9 - The estimated PA systolic pressure is 28 mmHg.   No evidence of stress induced myocardial ischemia.     Current Outpatient Prescriptions   Medication Sig    amlodipine (NORVASC) 10 MG tablet Take 10 mg by mouth once daily.     aspirin 81 MG Chew Take 1 tablet (81 mg total) by mouth once daily.    dorzolamide (TRUSOPT) 2 % ophthalmic solution 1 drop 3 (three) times daily.     metoprolol tartrate (LOPRESSOR) 25 MG tablet Take 25 mg by mouth once daily at 6am.     PRORENAL 8 mg iron-800 mcg-1,000 unit Tab Take 1 tablet by mouth once daily.    RENVELA 800 mg Tab 800 mg 3 (three) times daily with meals.     SENSIPAR 30 mg Tab Take 30 mg by mouth before dinner.     No current facility-administered medications for this visit.        Review of Systems   Constitution: Negative for diaphoresis, weakness and malaise/fatigue.   HENT: Negative for headaches.    Eyes: Negative for blurred vision.   Cardiovascular: Negative for chest pain, claudication, dyspnea on exertion, irregular heartbeat, leg swelling, orthopnea, palpitations, paroxysmal nocturnal dyspnea and syncope.   Respiratory: Negative for cough, shortness of breath, snoring and wheezing.    Hematologic/Lymphatic: Negative for adenopathy.   Skin: Negative for rash.   Musculoskeletal: Negative for back pain, muscle weakness and myalgias.   Gastrointestinal: Negative for abdominal pain, constipation, diarrhea, dysphagia and nausea.   Neurological:  "Positive for loss of balance (post-CVA). Negative for numbness.   Psychiatric/Behavioral: Negative for altered mental status.   Allergic/Immunologic: Negative for persistent infections.     Objective:     Left Arm BP - Sittin/85 (17 1342)    /85 (BP Location: Left arm, Patient Position: Sitting, BP Method: Automatic)  Pulse 83  Ht 5' 7" (1.702 m)  Wt 72.3 kg (159 lb 6.3 oz)  BMI 24.96 kg/m2    Physical Exam   Constitutional: He is oriented to person, place, and time. He appears well-developed and well-nourished.   HENT:   Head: Normocephalic.   Nose: Nose normal.   Eyes: Pupils are equal, round, and reactive to light.   Neck: No JVD present. Carotid bruit is not present.   Cardiovascular: Normal rate, regular rhythm, S1 normal, S2 normal and intact distal pulses.   No extrasystoles are present. PMI is not displaced.  Exam reveals gallop and S4. Exam reveals no friction rub.    No murmur heard.  Pulses:       Carotid pulses are 2+ on the right side, and 2+ on the left side.       Radial pulses are 0 on the right side, and 2+ on the left side.        Dorsalis pedis pulses are 2+ on the right side, and 2+ on the left side.   Pulmonary/Chest: Breath sounds normal. No respiratory distress.   Abdominal: Soft. Bowel sounds are normal. He exhibits no distension. There is no tenderness.   Musculoskeletal: Normal range of motion. He exhibits no edema.   Neurological: He is alert and oriented to person, place, and time. He is not disoriented.   Skin: Skin is warm and dry. No rash noted. No erythema.   Fistula to right forearm. Bruit auscultated.   Psychiatric: He has a normal mood and affect. His speech is normal and behavior is normal.   Nursing note and vitals reviewed.    Lab Results   Component Value Date     2017    K 4.9 2017    CL 96 2017    CO2 32 (H) 2017    BUN 17 2017    CREATININE 6.0 (H) 2017    GLU 93 2017    HGBA1C 4.4 (L) 2014    MG 2.4 " 01/07/2014    AST 12 03/04/2017    ALT 10 03/04/2017    ALBUMIN 3.7 03/04/2017    PROT 8.5 (H) 03/04/2017    BILITOT 0.9 03/04/2017    WBC 8.98 03/04/2017    HGB 8.2 (L) 03/30/2017    HCT 23.7 (L) 03/04/2017    MCV 92 03/04/2017     (H) 03/04/2017    TSH 0.989 10/13/2015    CHOL 160 06/05/2014    HDL 58 06/05/2014    LDLCALC 90.6 06/05/2014    TRIG 57 06/05/2014         Recent Labs  Lab 06/05/14  0735 09/21/15  2247 03/04/17  1403   INR 0.9 1.0 1.0        Test(s) Reviewed  I have reviewed the following in detail:  [] Stress test   [] Angiography   [x] Echocardiogram   [x] Labs   [] Other:         Assessment:         1. Preoperative cardiovascular examination. Patient has a Revised Cardiac Risk Index (RCRI) score of 3. The patient is a Class IV risk, with a 11% risk for major cardiac event. Moderate to high risk. He can walk up a flight of stairs without stopping for angina or dyspnea. He has no angina, heart failure, or unstable arrhythmia. Negative SPECT and DSE tests in 2015. No further cardiovascular testing is required prior to proceeding to the operating room.      2. Renal hypertension, stage 5 chronic kidney disease or end stage renal disease. Controlled on amlodipine 10mg and metoprolol 25mg BID.      3. NSTEMI (non-ST elevated myocardial infarction). On ASA. Will restart statin after obtaining lipid profile.     4. ESRD from DM/HTN started dialysis 01/2014. HD T,TH,Sat.     5. Cerebrovascular accident (CVA) due to embolism of other precerebral artery. On ASA. No newly reported CVA since this event.     6. Controlled type 2 diabetes mellitus with chronic kidney disease on chronic dialysis, without long-term current use of insulin. Not on insulin. On HD. HA1C 4.4.     Plan:     Haroldo was seen today for pre-op exam.    Diagnoses and all orders for this visit:    Preoperative cardiovascular examination  -     LIPID PANEL; Future; Expected date: 5/19/17    Renal hypertension, stage 5 chronic kidney  disease or end stage renal disease    NSTEMI (non-ST elevated myocardial infarction)    ESRD from DM/HTN started dialysis 01/2014    Cerebrovascular accident (CVA) due to embolism of other precerebral artery    Controlled type 2 diabetes mellitus with chronic kidney disease on chronic dialysis, without long-term current use of insulin  -     LIPID PANEL; Future; Expected date: 5/19/17      Continue all current medications as prescribed.    Will follow up after results of tests are available.    Return in about 1 year (around 5/19/2018), or if symptoms worsen or fail to improve.    I appreciate the opportunity to participate in Mr. Dickinson's care. Please do not hesitate to contact me with any questions or concerns.    Lindsay Vogel APRN, NP-C  Consult Cardiology

## 2017-05-19 NOTE — PATIENT INSTRUCTIONS
Obtain fasting blood test. Continue current medications.  We will follow up with you after the results of the test.

## 2017-05-19 NOTE — MR AVS SNAPSHOT
[facility]  [address]  [facility] [address]  Phone: [phone]                  Haroldo Dickinson   2017 1:20 PM   Office Visit    Description:  Male : 1975   Provider:  Lindsay Vogel NP   Department:  [facility]           Reason for Visit     Pre-op Exam           Diagnoses this Visit        Comments    Preoperative cardiovascular examination    -  Primary     Renal hypertension, stage 5 chronic kidney disease or end stage renal disease         NSTEMI (non-ST elevated myocardial infarction)         ESRD (end stage renal disease)         Controlled type 2 diabetes mellitus with chronic kidney disease on chronic dialysis, without long-term current use of insulin                To Do List           Goals (5 Years of Data)     None      Follow-Up and Disposition     Return in about 1 year (around 2018), or if symptoms worsen or fail to improve.      [facility]     [facility] On Call Nurse Care Line -  Assistance  Unless otherwise directed by your provider, please contact [facility], our nurse care line that is available for  assistance.     Registered nurses in the [facility] provide: appointment scheduling, clinical advisement, health education, and other advisory services.  Call: [phone] (toll free)               Medications           Message regarding Medications     Verify the changes and/or additions to your medication regime listed below are the same as discussed with your clinician today.  If any of these changes or additions are incorrect, please notify your healthcare provider.        STOP taking these medications     lidocaine-prilocaine (EMLA) cream APPLY SMALL AMOUNT TO ACCESS SITE 1 TO 2 HOURS BEFORE TREATMENT. COVER AREA WITH AN OCCLUSIVE DRESSING.    latanoprost 0.005 % ophthalmic solution     metoprolol succinate (TOPROL-XL) 50 MG 24 hr tablet            Verify that the below list of medications is an  "accurate representation of the medications you are currently taking.  If none reported, the list may be blank. If incorrect, please contact your healthcare provider. Carry this list with you in case of emergency.           Current Medications     amlodipine (NORVASC) 10 MG tablet Take 10 mg by mouth once daily.     aspirin 81 MG Chew Take 1 tablet (81 mg total) by mouth once daily.    dorzolamide (TRUSOPT) 2 % ophthalmic solution 1 drop 3 (three) times daily.     metoprolol tartrate (LOPRESSOR) 25 MG tablet Take 25 mg by mouth once daily at 6am.     PRORENAL 8 mg iron-800 mcg-1,000 unit Tab Take 1 tablet by mouth once daily.    RENVELA 800 mg Tab 800 mg 3 (three) times daily with meals.     SENSIPAR 30 mg Tab Take 30 mg by mouth before dinner.           Clinical Reference Information           Your Vitals Were     BP Pulse Height Weight BMI    133/85 (BP Location: Left arm, Patient Position: Sitting, BP Method: Automatic) 83 5' 7" (1.702 m) 72.3 kg (159 lb 6.3 oz) 24.96 kg/m2      Blood Pressure          Most Recent Value    Left Arm BP - Sitting  133/85    Reason for not completing BP on both arms  AV shunt    BP  133/85      Allergies as of 5/19/2017     No Known Allergies      Immunizations Administered on Date of Encounter - 5/19/2017     None      Orders Placed During Today's Visit     Future Labs/Procedures Expected by Expires    LIPID PANEL  5/19/2017 7/18/2018      Maintenance Dialysis History     Start End Type Comments Center    12/16/2013  [place]  [facility]            Current Dialysis Center Information     [facility] [address], [place] Phone #:  [phone]    Contact:  N/A ERICK MANCILLA  [address] Fax #:  [phone]            Transplant Information        Txp Date Organ Coordinator Care Team     Kidney Le Brito, RN Referring Physician:  Kristy Pastrana DO   Current Nephrologist:  Shirisha Bodana, DO [facility] Sign[facility]Up     Activating your " [facility] account is as easy as 1-2-3!     1) Visit [email or web address], select Sign Up Now, enter this activation code and your date of birth, then select Next.  [email or web address]  Expires: 6/17/2017 10:01 AM      2) Create a username and password to use when you visit [facility] in the future and select a security question in case you lose your password and select Next.    3) Enter your e-mail address and click Sign Up!    Additional Information  If you have questions, please e-mail [email or web address] or call [phone] to talk to our [facility] staff. Remember, [facility] is NOT to be used for urgent needs. For medical emergencies, dial 911.         Instructions    Get fasting blood test. Continue current medications.  We will follow up with you after the results of the test.       Language Assistance Services     ATTENTION: Language assistance services are available, free of charge. Please call [phone].      ATENCIÓN: Si habla español, tiene a loya disposición servicios gratuitos de asistencia lingüística. Llame al [phone].     SOY Ý: N?u b?n nói Ti?ng Vi?t, có các d?ch v? h? tr? ngôn ng? mi?n phí dành cho b?n. G?i s? [phone].         [facility] complies with applicable Federal civil rights laws and does not discriminate on the basis of race, color, national origin, age, disability, or sex.

## 2017-05-24 ENCOUNTER — LAB VISIT (OUTPATIENT)
Dept: LAB | Facility: HOSPITAL | Age: 42
End: 2017-05-24
Payer: MEDICARE

## 2017-05-24 DIAGNOSIS — Z01.810 PREOPERATIVE CARDIOVASCULAR EXAMINATION: ICD-10-CM

## 2017-05-24 DIAGNOSIS — E11.22 CONTROLLED TYPE 2 DIABETES MELLITUS WITH CHRONIC KIDNEY DISEASE ON CHRONIC DIALYSIS, WITHOUT LONG-TERM CURRENT USE OF INSULIN: Chronic | ICD-10-CM

## 2017-05-24 DIAGNOSIS — N18.6 CONTROLLED TYPE 2 DIABETES MELLITUS WITH CHRONIC KIDNEY DISEASE ON CHRONIC DIALYSIS, WITHOUT LONG-TERM CURRENT USE OF INSULIN: Chronic | ICD-10-CM

## 2017-05-24 DIAGNOSIS — Z99.2 CONTROLLED TYPE 2 DIABETES MELLITUS WITH CHRONIC KIDNEY DISEASE ON CHRONIC DIALYSIS, WITHOUT LONG-TERM CURRENT USE OF INSULIN: Chronic | ICD-10-CM

## 2017-05-24 LAB
CHOLEST/HDLC SERPL: 3.4 {RATIO}
HDL/CHOLESTEROL RATIO: 29.7 %
HDLC SERPL-MCNC: 212 MG/DL
HDLC SERPL-MCNC: 63 MG/DL
LDLC SERPL CALC-MCNC: 119.8 MG/DL
NONHDLC SERPL-MCNC: 149 MG/DL
TRIGL SERPL-MCNC: 146 MG/DL

## 2017-05-24 PROCEDURE — 80061 LIPID PANEL: CPT | Mod: TXP

## 2017-05-24 PROCEDURE — 36415 COLL VENOUS BLD VENIPUNCTURE: CPT | Mod: TXP

## 2017-05-30 ENCOUNTER — TELEPHONE (OUTPATIENT)
Dept: CARDIOLOGY | Facility: CLINIC | Age: 42
End: 2017-05-30

## 2017-05-30 DIAGNOSIS — Z76.82 AWAITING ORGAN TRANSPLANT STATUS: Primary | ICD-10-CM

## 2017-06-05 ENCOUNTER — TELEPHONE (OUTPATIENT)
Dept: CARDIOLOGY | Facility: CLINIC | Age: 42
End: 2017-06-05

## 2017-06-05 DIAGNOSIS — E78.00 PURE HYPERCHOLESTEROLEMIA: Primary | ICD-10-CM

## 2017-06-05 RX ORDER — ATORVASTATIN CALCIUM 20 MG/1
20 TABLET, FILM COATED ORAL DAILY
Qty: 90 TABLET | Refills: 3 | Status: SHIPPED | OUTPATIENT
Start: 2017-06-05 | End: 2018-06-07 | Stop reason: SDUPTHER

## 2017-06-05 NOTE — TELEPHONE ENCOUNTER
Discussed results of lipid test. Advised patient to start taking atorvastatin 20mg daily with repeat blood test in 6 weeks. Patient verbalized understanding.

## 2017-06-16 ENCOUNTER — LAB VISIT (OUTPATIENT)
Dept: LAB | Facility: HOSPITAL | Age: 42
End: 2017-06-16
Payer: MEDICARE

## 2017-06-16 DIAGNOSIS — Z76.82 ORGAN TRANSPLANT CANDIDATE: ICD-10-CM

## 2017-07-17 ENCOUNTER — LAB VISIT (OUTPATIENT)
Dept: LAB | Facility: HOSPITAL | Age: 42
End: 2017-07-17
Payer: MEDICARE

## 2017-07-17 DIAGNOSIS — E78.00 PURE HYPERCHOLESTEROLEMIA: ICD-10-CM

## 2017-07-17 LAB
ALBUMIN SERPL BCP-MCNC: 3.5 G/DL
ALP SERPL-CCNC: 82 U/L
ALT SERPL W/O P-5'-P-CCNC: 14 U/L
ANION GAP SERPL CALC-SCNC: 17 MMOL/L
AST SERPL-CCNC: 16 U/L
BILIRUB SERPL-MCNC: 0.8 MG/DL
BUN SERPL-MCNC: 68 MG/DL
CALCIUM SERPL-MCNC: 9.4 MG/DL
CHLORIDE SERPL-SCNC: 97 MMOL/L
CHOLEST/HDLC SERPL: 2.6 {RATIO}
CO2 SERPL-SCNC: 27 MMOL/L
CREAT SERPL-MCNC: 15.4 MG/DL
EST. GFR  (AFRICAN AMERICAN): 4 ML/MIN/1.73 M^2
EST. GFR  (NON AFRICAN AMERICAN): 3.5 ML/MIN/1.73 M^2
GLUCOSE SERPL-MCNC: 93 MG/DL
HDL/CHOLESTEROL RATIO: 38.2 %
HDLC SERPL-MCNC: 123 MG/DL
HDLC SERPL-MCNC: 47 MG/DL
LDLC SERPL CALC-MCNC: 56.4 MG/DL
NONHDLC SERPL-MCNC: 76 MG/DL
POTASSIUM SERPL-SCNC: 4.1 MMOL/L
PROT SERPL-MCNC: 7.9 G/DL
SODIUM SERPL-SCNC: 141 MMOL/L
TRIGL SERPL-MCNC: 98 MG/DL

## 2017-07-17 PROCEDURE — 80053 COMPREHEN METABOLIC PANEL: CPT | Mod: TXP

## 2017-07-17 PROCEDURE — 80061 LIPID PANEL: CPT | Mod: TXP

## 2017-07-17 PROCEDURE — 36415 COLL VENOUS BLD VENIPUNCTURE: CPT | Mod: TXP

## 2017-07-24 ENCOUNTER — TELEPHONE (OUTPATIENT)
Dept: CARDIOLOGY | Facility: CLINIC | Age: 42
End: 2017-07-24

## 2017-07-24 NOTE — TELEPHONE ENCOUNTER
Called patient and informed him of lipid and BMP results. Patient to continue current medications. Patient to continue dialysis. Patient verbalized understanding.

## 2017-07-31 DIAGNOSIS — Z76.82 ORGAN TRANSPLANT CANDIDATE: ICD-10-CM

## 2017-07-31 LAB — HPRA INTERPRETATION: NORMAL

## 2017-07-31 PROCEDURE — 86829 HLA CLASS I/II ANTIBODY QUAL: CPT | Mod: 91,PO,TXP

## 2017-07-31 PROCEDURE — 86829 HLA CLASS I/II ANTIBODY QUAL: CPT | Mod: PO,TXP

## 2017-08-04 ENCOUNTER — TELEPHONE (OUTPATIENT)
Dept: TRANSPLANT | Facility: CLINIC | Age: 42
End: 2017-08-04

## 2017-08-04 NOTE — TELEPHONE ENCOUNTER
----- Message from Rose Mary Taveras LCSW sent at 8/3/2017  7:19 AM CDT -----  Regarding: RE: francisco stein   So, does his family understand that he needs on-going long term care? I see that he lives with his supportive grandmother who is elderly (age 75) and his mother. If we think this is a good idea to proceed, then a family meeting is in order with ALL people who plan to be involved in his care; especially his transportation.     Social workers, what do you all think?    -Calleen       ----- Message -----  From: Le Brito RN  Sent: 8/2/2017   4:19 PM  To: Rose Mary Taveras LCSW  Subject: RE: neuropsych eval                              Sorry! I just saw this!  Well it isnt a very camryn clearance. What do you think? I can bring it next week for discussion? Thx, K    ----- Message -----  From: Rose Mary Taveras LCSW  Sent: 7/27/2017   7:30 AM  To: Le Brito RN  Subject: RE: neuropsych eval                              Do I need to do anything else with this information?    -Calleen    ----- Message -----  From: Le Brito RN  Sent: 5/18/2017  10:37 AM  To: Rose Mary Taveras LCSW  Subject: RE: neuropsych eval                              I am not sure, I can put him on for discussion next wed. Thx!! K  ----- Message -----     From: Rose Mary Taveras LCSW     Sent: 5/17/2017   1:18 PM       To: Le Brito RN, #  Subject: RE: neuropsych sarita                              For my initial viewing, I concur that he will need to live with his supportive family long term.    Does the family understand that he will need long term lifetime care?    -Calleen  ----- Message -----     From: Le Brito RN     Sent: 5/16/2017   7:11 PM       To: [facility] Transplant Social Workers  Subject: neuropsych eval                                  Hi guys,   Please read eval and let me know your thoughts, thx!K  ----- Message -----     From: Kaia Barnett     Sent: 5/16/2017  10:59 AM        To: Kidney Waitlisted Coordinator        ----- Message -----     From: Cordelia Singer NP     Sent: 5/15/2017   4:04 PM       To: [facility] Pre-Kidney Transplant Clinical        ----- Message -----     From: RAY Coburn II, PhD     Sent: 5/15/2017   3:39 PM       To: Cordelia Singer NP

## 2017-08-16 ENCOUNTER — COMMITTEE REVIEW (OUTPATIENT)
Dept: TRANSPLANT | Facility: CLINIC | Age: 42
End: 2017-08-16

## 2017-08-16 ENCOUNTER — TELEPHONE (OUTPATIENT)
Dept: NEPHROLOGY | Facility: CLINIC | Age: 42
End: 2017-08-16

## 2017-08-16 NOTE — TELEPHONE ENCOUNTER
Hello.  I am no longer seeing this patient  I am not sure who is following him.  Please contact the patient to see who his nephrologist is and forward it to him. Thanks.

## 2017-08-16 NOTE — COMMITTEE REVIEW
Native Organ Dx: Diabetes Mellitus - Type II      Unable to determine transplant candidacy at this time due to need to assess his knowledge on his medical care, verify if he is able to make his own medical decisions, does he require a medical POA? must have 2 strong caregivers accompany him to listed RR, he will be seen every 6 months in transplant clinic to assess caregivers and functional status seconadary to past CVA and NSTEMI. will forward neuropsych note to patient's nephrologist for review. He will be made status 7 until all requirements have been met sucessfully.         Note written by Le Brito RN    ===============================================

## 2017-08-16 NOTE — LETTER
August 16, 2017    Haroldo Dickinson  [address] [facility] [address]        Dear Haroldo Dickinson:  MRN: [id number]    The [facility] team reviewed your transplant candidacy.  It is our programs opinion that you are temporarily not a transplant candidate at our facility as of 8/16/2017 due to the lack of a solid caregiver plan needed with 2 caregivers, and your recent neuropsychological evaluation that warrants further assessment by the transplant team. You will remain listed on the wait list, but will not be able to receive a transplant until you have been seen in transplant clinic with 2 caregivers in attendance, and a solid plan for transplant is in place.      Attached is a letter from the [employer] Network for Organ Sharing (UNOS).  It describes the services and information offered to patients by UNOS and the Organ Procurement and Transplant Network.    The [facility] team remains available to answer any questions.  Should you have any questions regarding this decision, please do not hesitate to contact our pre-transplant office.      Sincerely,        Anu Hassan MD  Medical Director, Kidney & Kidney/Pancreas Transplantation    [facility]/    Cc: Kristy Pastrana DO         [facility]          OPTN/UNOS: Your Resource for Organ Transplant Information        If you have a question regarding your own medical care, you always should call your transplant center first. However, for general organ transplant-related information, you can call the [employer] Organ Sharing (UNOS) toll-free patient services line at [phone].    Anyone, including potential transplant candidates, recipients, family members/friends, living donors, and/or donor family members can call this number to:    · talk about organ donation, living donation, how transplant and donation work, the donation process, transplant policies, and transplant/donor  information;  · get a free patient information kit with helpful booklets, waiting list and transplant information, and a list of all transplant centers;  · ask questions about the Organ Procurement and Transplantation Network (OPTN) web site (www.optn.transplant.hrsa.gov); the UNOS Web site (www.unos.org); or the UNOS web site for living donors and transplant recipients (www.transplantliving.org);  · learn how UNOS and the OPTN can help you;  · talk about any concerns that you may have with a transplant center and how they perform    UNOS is a not-for-profit organization that provides all of the administrative services for the national OPTN under federal contract to the Health Resources and Services Administration (HRSA), an agency under the U.S. Department of Health and Human Services (HHS).     UNOS and OPTN responsibilities include:    · writing educational material for patients, the public and professionals;  · helping to make people aware of the need for donated organs and tissue;  · writing organ transplant policy with help from doctors, nurses, transplant patients/candidates, donor families, living donors, and the public;  · coordinating the organ matching and placement process;  · collecting information about every organ transplant and donation that occurs in the [place].    Remember, you should contact your transplant center directly if you have questions or concerns about your own medical care including medical records, work-up progress and test reports. [facility] is not your transplant center, and staff at [facility] will not be able to transfer you to your transplant center, so keep your transplant centers phone number handy. But, while you research your transplant needs and learn as much as you can about transplantation and donation, we welcome your call to our toll-free patient services line at [phone].

## 2017-08-20 ENCOUNTER — TELEPHONE (OUTPATIENT)
Dept: TRANSPLANT | Facility: CLINIC | Age: 42
End: 2017-08-20

## 2017-09-01 ENCOUNTER — TELEPHONE (OUTPATIENT)
Dept: TRANSPLANT | Facility: CLINIC | Age: 42
End: 2017-09-01

## 2017-09-01 NOTE — TELEPHONE ENCOUNTER
SW attempted to contact [facility] to discuss pt's social support and functional level, especially self care abilities.  SW unavailable.  Left VM stating reason for call and requesting return call.

## 2017-09-05 ENCOUNTER — TELEPHONE (OUTPATIENT)
Dept: TRANSPLANT | Facility: CLINIC | Age: 42
End: 2017-09-05

## 2017-09-05 ENCOUNTER — TELEPHONE (OUTPATIENT)
Dept: TRANSPLANT | Facility: HOSPITAL | Age: 42
End: 2017-09-05

## 2017-09-05 NOTE — TELEPHONE ENCOUNTER
"----- Message from Autumn Nolasco LCSW sent at 9/5/2017  1:17 PM CDT -----  Thanks.      ----- Message -----  From: Le Brito RN  Sent: 9/5/2017  12:50 PM  To: Autumn Nolasco LCSW    Hmm OK, I will forward your email to Dr Mccray, she was talking to his nephrologist. I will bring him for discussion of she feels its time. Thank you Autumn! MARCIA  ----- Message -----  From: Autumn Nolasco LCSW  Sent: 9/5/2017  11:54 AM  To: Le rBito, RN    FYI:  He has some issues.    Compliance check:  Dialysis unit reports in the past three months pt has had 0 no shows, 0 AMAs, (SW reports patient had been missing "here and there" at the previous unit, [facility] due to problems with transportation.  Pt now has [facility] and is dialyzing at [facility].  Pt has moved around to different units.  Labs: no concerns, however, pt "He has memory problems and often reports he forgets his medicine.  I can see him forgetting his transplant medications as he has no oversight", transportation currently uses [facility].  Family is not dependable, per EM; and family support: per em, "there is lots of family, but no one is consistent.  Sister works, brother and father are not dependable, no one appears to be very close to the patient."  EM stated, "I feel real bad saying this, but I think he is not a good candidate for transplant".    Reported by GIOVANNA Riggs        "

## 2017-09-13 PROCEDURE — 86833 HLA CLASS II HIGH DEFIN QUAL: CPT | Mod: PO,TXP

## 2017-09-13 PROCEDURE — 86832 HLA CLASS I HIGH DEFIN QUAL: CPT | Mod: PO,TXP

## 2017-09-15 ENCOUNTER — LAB VISIT (OUTPATIENT)
Dept: LAB | Facility: HOSPITAL | Age: 42
End: 2017-09-15
Payer: MEDICARE

## 2017-09-15 DIAGNOSIS — Z76.82 ORGAN TRANSPLANT CANDIDATE: ICD-10-CM

## 2017-09-29 LAB — HPRA INTERPRETATION: NORMAL

## 2017-10-06 LAB
CLASS I ANTIBODIES - LUMINEX: NORMAL
CLASS I ANTIBODY COMMENTS - LUMINEX: NORMAL
CLASS II ANTIBODIES - LUMINEX: NORMAL
CPRA %: 37
SERUM COLLECTION DT - LUMINEX CLASS I: NORMAL
SERUM COLLECTION DT - LUMINEX CLASS II: NORMAL
SPCL1 TESTING DATE: NORMAL
SPCL2 TESTING DATE: NORMAL
SPCLU TESTING DATE: NORMAL

## 2017-10-15 RX ORDER — METOPROLOL TARTRATE 25 MG/1
TABLET, FILM COATED ORAL
Qty: 120 TABLET | Refills: 0 | OUTPATIENT
Start: 2017-10-15

## 2017-12-06 PROCEDURE — 86833 HLA CLASS II HIGH DEFIN QUAL: CPT | Mod: PO,TXP

## 2017-12-06 PROCEDURE — 86832 HLA CLASS I HIGH DEFIN QUAL: CPT | Mod: PO,TXP

## 2017-12-11 ENCOUNTER — LAB VISIT (OUTPATIENT)
Dept: LAB | Facility: HOSPITAL | Age: 42
End: 2017-12-11
Payer: MEDICARE

## 2017-12-11 DIAGNOSIS — Z76.82 ORGAN TRANSPLANT CANDIDATE: ICD-10-CM

## 2017-12-20 LAB — HPRA INTERPRETATION: NORMAL

## 2017-12-23 LAB
CLASS I ANTIBODIES - LUMINEX: NORMAL
CLASS I ANTIBODY COMMENTS - LUMINEX: NORMAL
CLASS II ANTIBODIES - LUMINEX: NORMAL
CLASS II ANTIBODY COMMENTS - LUMINEX: NORMAL
CPRA %: 37
SERUM COLLECTION DT - LUMINEX CLASS I: NORMAL
SERUM COLLECTION DT - LUMINEX CLASS II: NORMAL
SPCL1 TESTING DATE: NORMAL
SPCL2 TESTING DATE: NORMAL
SPCLU TESTING DATE: NORMAL

## 2018-03-07 PROCEDURE — 86832 HLA CLASS I HIGH DEFIN QUAL: CPT | Mod: PO,TXP

## 2018-03-07 PROCEDURE — 86833 HLA CLASS II HIGH DEFIN QUAL: CPT | Mod: PO,TXP

## 2018-03-12 ENCOUNTER — LAB VISIT (OUTPATIENT)
Dept: LAB | Facility: HOSPITAL | Age: 43
End: 2018-03-12
Payer: MEDICARE

## 2018-03-12 DIAGNOSIS — Z76.82 ORGAN TRANSPLANT CANDIDATE: ICD-10-CM

## 2018-03-15 LAB — HPRA INTERPRETATION: NORMAL

## 2018-06-07 ENCOUNTER — OFFICE VISIT (OUTPATIENT)
Dept: CARDIOLOGY | Facility: CLINIC | Age: 43
End: 2018-06-07
Payer: MEDICARE

## 2018-06-07 VITALS
SYSTOLIC BLOOD PRESSURE: 151 MMHG | WEIGHT: 171.94 LBS | BODY MASS INDEX: 26.06 KG/M2 | HEART RATE: 69 BPM | DIASTOLIC BLOOD PRESSURE: 92 MMHG | HEIGHT: 68 IN

## 2018-06-07 DIAGNOSIS — N18.6 CONTROLLED TYPE 2 DIABETES MELLITUS WITH CHRONIC KIDNEY DISEASE ON CHRONIC DIALYSIS, WITHOUT LONG-TERM CURRENT USE OF INSULIN: Chronic | ICD-10-CM

## 2018-06-07 DIAGNOSIS — I10 MALIGNANT HYPERTENSION: Chronic | ICD-10-CM

## 2018-06-07 DIAGNOSIS — E11.22 CONTROLLED TYPE 2 DIABETES MELLITUS WITH CHRONIC KIDNEY DISEASE ON CHRONIC DIALYSIS, WITHOUT LONG-TERM CURRENT USE OF INSULIN: Chronic | ICD-10-CM

## 2018-06-07 DIAGNOSIS — E78.00 PURE HYPERCHOLESTEROLEMIA: ICD-10-CM

## 2018-06-07 DIAGNOSIS — Z99.2 CONTROLLED TYPE 2 DIABETES MELLITUS WITH CHRONIC KIDNEY DISEASE ON CHRONIC DIALYSIS, WITHOUT LONG-TERM CURRENT USE OF INSULIN: Chronic | ICD-10-CM

## 2018-06-07 DIAGNOSIS — N18.6 ESRD (END STAGE RENAL DISEASE): Chronic | ICD-10-CM

## 2018-06-07 DIAGNOSIS — I11.9 HYPERTENSIVE LEFT VENTRICULAR HYPERTROPHY, WITHOUT HEART FAILURE: Chronic | ICD-10-CM

## 2018-06-07 DIAGNOSIS — I25.2 HISTORY OF NON-ST ELEVATION MYOCARDIAL INFARCTION (NSTEMI): ICD-10-CM

## 2018-06-07 DIAGNOSIS — Z01.818 PRE-TRANSPLANT EVALUATION FOR CHRONIC KIDNEY DISEASE: Primary | ICD-10-CM

## 2018-06-07 PROCEDURE — 99213 OFFICE O/P EST LOW 20 MIN: CPT | Mod: PBBFAC,TXP | Performed by: NURSE PRACTITIONER

## 2018-06-07 PROCEDURE — 99999 PR PBB SHADOW E&M-EST. PATIENT-LVL III: CPT | Mod: PBBFAC,TXP,, | Performed by: NURSE PRACTITIONER

## 2018-06-07 PROCEDURE — 99214 OFFICE O/P EST MOD 30 MIN: CPT | Mod: S$PBB,TXP,, | Performed by: NURSE PRACTITIONER

## 2018-06-07 RX ORDER — ATORVASTATIN CALCIUM 20 MG/1
20 TABLET, FILM COATED ORAL DAILY
Qty: 90 TABLET | Refills: 3 | Status: SHIPPED | OUTPATIENT
Start: 2018-06-07 | End: 2019-07-09

## 2018-06-07 RX ORDER — LISINOPRIL 10 MG/1
10 TABLET ORAL DAILY
Qty: 90 TABLET | Refills: 3 | Status: CANCELLED | OUTPATIENT
Start: 2018-06-07 | End: 2019-06-07

## 2018-06-07 RX ORDER — VALSARTAN 80 MG/1
80 TABLET ORAL DAILY
Qty: 90 TABLET | Refills: 3 | Status: SHIPPED | OUTPATIENT
Start: 2018-06-07 | End: 2019-07-09

## 2018-06-07 RX ORDER — METOPROLOL SUCCINATE 100 MG/1
TABLET, EXTENDED RELEASE ORAL
COMMUNITY
End: 2021-05-25 | Stop reason: ALTCHOICE

## 2018-06-07 NOTE — PATIENT INSTRUCTIONS
Increase cardiovascular exercise to 30 minutes of brisk walking a day for 4-5 days a week.  You can use a stationary bike or swim for 30 minutes a day instead of walking.  Whatever exercise you choose, make sure you are working hard enough to increase your heart rate.     Start the valsartan 80mg by mouth one time daily for your blood pressure    Please call with your blood pressure readings in 2 weeks.

## 2018-06-07 NOTE — PROGRESS NOTES
Mr. Dickinson is a patient of Dr. Villa and was last seen in [facility] Visit 5/19/17.    Subjective:   Patient ID:  Haroldo Dickinson is a 42 y.o. male who presents for follow-up of Hypertension (1 yr f/u )    Problems:  ESRD (on HD)   Hypertensive crisis with stroke and possible NSTEMI in 2013   HTN   DM II   Diastolic dysfunction, EF 60-65%  Concentric LVH    HPI  Mr. Dickinson is in clinic today for routine follow up.  Patient denies chest pain with exertion or at rest, palpitations, SOB, OSEGUERA, dizziness, syncope, edema, orthopnea, PND, or claudication.  Reports no routine exercise.  He is treated with low dose ASA and moderate intensity statin.  Hypertension is treated with amlodipine 10mg and metoprolol 100mg.  Reports following a low salt diet. Home blood pressure readings are 150s/90s.        Revised Cardiac Risk Index for Pre-Operative Risk Yes +1 No 0   1. High-risk surgery: Intraperitoneal; intrathoracic; suprainguinal vascular    0   2. History of ischemic heart disease:  History of myocardial infarction (MI); history of positive exercise test; current chest paint considered due to myocardial ischemia; use of nitrate therapy or ECG with pathological Q waves    0   3. History of congestive heart failure:  Pulmonary edema, bilateral rales or S3 gallop; paroxysmal nocturnal dyspnea; chest x-ray (CXR) showing pulmonary vascular redistribution    0   4. History of cerebrovascular disease: Prior transient ischemic attack (TIA) or stroke   1    5.  Pre-operative treatment with insulin  0   6.  Creatinine >2 1      Total Risk for hemanth-operative major cardiac event, 6.6%    Review of Systems   Constitution: Negative for decreased appetite, diaphoresis, weakness, malaise/fatigue, weight gain and weight loss.   Eyes: Negative for visual disturbance.   Cardiovascular: Negative for chest pain, claudication, dyspnea on exertion, irregular heartbeat, leg swelling, near-syncope, orthopnea, palpitations, paroxysmal  "nocturnal dyspnea and syncope.        Denies chest pressure   Respiratory: Negative for cough, hemoptysis, shortness of breath, sleep disturbances due to breathing and snoring.    Endocrine: Negative for cold intolerance and heat intolerance.   Hematologic/Lymphatic: Negative for bleeding problem. Does not bruise/bleed easily.   Musculoskeletal: Negative for myalgias.   Gastrointestinal: Negative for bloating, abdominal pain, anorexia, change in bowel habit, constipation, diarrhea, nausea and vomiting.   Neurological: Negative for difficulty with concentration, disturbances in coordination, excessive daytime sleepiness, dizziness, headaches, light-headedness, loss of balance and numbness.   Psychiatric/Behavioral: The patient does not have insomnia.      Allergies and current medications updated and reviewed:  Review of patient's allergies indicates:   Allergen Reactions    No known allergies      Current Outpatient Prescriptions   Medication Sig    amlodipine (NORVASC) 10 MG tablet Take 10 mg by mouth once daily.     aspirin 81 MG Chew Take 1 tablet (81 mg total) by mouth once daily.    dorzolamide (TRUSOPT) 2 % ophthalmic solution 1 drop 3 (three) times daily.     metoprolol succinate (TOPROL XL) 100 MG 24 hr tablet Toprol  mg tablet,extended release   Take 1 tablet every day by oral route.    PRORENAL 8 mg iron-800 mcg-1,000 unit Tab Take 1 tablet by mouth once daily.    RENVELA 800 mg Tab 800 mg 3 (three) times daily with meals.     SENSIPAR 30 mg Tab Take 30 mg by mouth before dinner.    atorvastatin (LIPITOR) 20 MG tablet Take 1 tablet (20 mg total) by mouth once daily.  (not taking)     No current facility-administered medications for this visit.      Objective:     Left Arm BP - Sittin/92 (18 0852)    BP (!) 151/92 (BP Location: Left arm, Patient Position: Sitting, BP Method: Medium (Automatic))   Pulse 69   Ht 5' 8" (1.727 m)   Wt 78 kg (171 lb 15.3 oz)   BMI 26.15 kg/m² "     Physical Exam   Constitutional: He is oriented to person, place, and time. Vital signs are normal. He appears well-developed and well-nourished. He is active. No distress.   HENT:   Head: Normocephalic and atraumatic.   Eyes: Conjunctivae and lids are normal. No scleral icterus.   Neck: Neck supple. Normal carotid pulses, no hepatojugular reflux and no JVD present. Carotid bruit is not present.   Cardiovascular: Normal rate, regular rhythm, S1 normal, S2 normal and intact distal pulses.  PMI is not displaced.  Exam reveals no gallop and no friction rub.    Murmur heard.   Harsh midsystolic murmur is present with a grade of 1/6  at the upper right sternal border radiating to the neck  Pulses:       Carotid pulses are 2+ on the right side, and 2+ on the left side.       Radial pulses are 2+ on the right side, and 2+ on the left side.        Dorsalis pedis pulses are 1+ on the right side, and 1+ on the left side.        Posterior tibial pulses are 1+ on the right side, and 1+ on the left side.   Pulmonary/Chest: Effort normal and breath sounds normal. No respiratory distress. He has no decreased breath sounds. He has no wheezes. He has no rhonchi. He has no rales. He exhibits no tenderness.   Abdominal: Soft. Normal appearance and bowel sounds are normal. He exhibits no distension, no fluid wave, no abdominal bruit, no ascites and no pulsatile midline mass. There is no hepatosplenomegaly. There is no tenderness.   Musculoskeletal: He exhibits no edema.   Neurological: He is alert and oriented to person, place, and time. Gait normal.   Skin: Skin is warm, dry and intact. No rash noted. He is not diaphoretic. Nails show no clubbing.   Psychiatric: He has a normal mood and affect. His speech is normal and behavior is normal. Judgment and thought content normal. Cognition and memory are normal.   Nursing note and vitals reviewed.      Chemistry        Component Value Date/Time     07/17/2017 0847    K 4.1  07/17/2017 0847    CL 97 07/17/2017 0847    CO2 27 07/17/2017 0847    BUN 68 (H) 07/17/2017 0847    CREATININE 15.4 (H) 07/17/2017 0847    GLU 93 07/17/2017 0847        Component Value Date/Time    CALCIUM 9.4 07/17/2017 0847    ALKPHOS 82 07/17/2017 0847    AST 16 07/17/2017 0847    ALT 14 07/17/2017 0847    BILITOT 0.8 07/17/2017 0847    ESTGFRAFRICA 4.0 (A) 07/17/2017 0847    EGFRNONAA 3.5 (A) 07/17/2017 0847          Lab Results   Component Value Date    HGBA1C 4.4 (L) 06/05/2014         Recent Labs  Lab 09/21/15  2247 10/13/15  0906  03/04/17  1403  03/30/17  1041  07/17/17  0847   WHITE BLOOD CELL COUNT 7.94  --   < > 8.98  --   --   --   --    HEMOGLOBIN 13.1 L  --   < > 7.9 L  < > 8.2 L  --   --    HEMATOCRIT 41.6  --   < > 23.7 L  --   --   --   --     H  --   < > 92  --   --   --   --    PLATELETS 473 H  --   < > 393 H  --   --   --   --    BNP 26  --   --   --   --   --   --   --    TSH  --  0.989  --   --   --   --   --   --    CHOLESTEROL  --   --   --   --   --   --   < > 123   HDL  --   --   --   --   --   --   < > 47   LDL CHOLESTEROL  --   --   --   --   --   --   < > 56.4 L   TRIGLYCERIDES  --   --   --   --   --   --   < > 98   HDL/CHOLESTEROL RATIO  --   --   --   --   --   --   < > 38.2   < > = values in this interval not displayed.      Recent Labs  Lab 09/21/15  2247 03/04/17  1403   INR 1.0 1.0        Test(s) Reviewed  I have reviewed the following in detail:  [] Stress test   [] Angiography   [x] Echocardiogram   [x] Labs   [] Other:         Assessment/Plan:     Pre-transplant evaluation for chronic kidney disease    Malignant hypertension  Comments:  BP not at goal <130/80. Start valsartan 80mg. Instructed to call with BP log in 2 weeks.     Hypertensive left ventricular hypertrophy, without heart failure  -     Comprehensive metabolic panel; Future; Expected date: 06/07/2018  -     CBC auto differential; Future; Expected date: 06/07/2018  -     valsartan (DIOVAN) 80 MG tablet; Take  1 tablet (80 mg total) by mouth once daily.  Dispense: 90 tablet; Refill: 3    History of non-ST elevation myocardial infarction (NSTEMI)    ESRD from DM/HTN started dialysis 01/2014    Controlled type 2 diabetes mellitus with chronic kidney disease on chronic dialysis, without long-term current use of insulin  Comments:  A1C at goal <7. F/U with PCP as planned.   Orders:  -     Lipid panel; Future; Expected date: 06/07/2018  -     TSH; Future; Expected date: 06/07/2018    Pure hypercholesterolemia  Comments:  LDL well controlled, 56. No changes.   Orders:  -     atorvastatin (LIPITOR) 20 MG tablet; Take 1 tablet (20 mg total) by mouth once daily.  Dispense: 90 tablet; Refill: 3    Outside laboratory studies requested from PCP office.  Ordered labs in the event lab results are unable to be obtained.    CC: Dr. Tess Ledbetter    Mr. Dickinson has no active cardiac condition (ACS/USA, decompenstated CHF, significant arrhythmias or severe valvular disease) and can easily achieve 4 METS.  He is at moderate to high risk, 6.6%, for a major cardiac event during the perioperative period.  Pt does not require further cardiac evaluation prior to undergoing renal transplant surgery.  Pt should remain on beta-blockers throughout the entire hemanth-procedure time period.  Aspirin therapy can held but should be restarted as soon as safely possible.  The remaining cardiac meds can be held as needed but should also be restarted after the procedure. These recommendations follow the most current Guideline on Perioperative Cardiovascular Evaluation and Management of Patients Undergoing Noncardiac Surgery released by the ACC/AHA. (JACC 2014.07.944).      Follow-up in about 3 months (around 9/7/2018).

## 2018-07-11 NOTE — PROGRESS NOTES
5/10/18: progress note from care everywhere:     Reported By: Patient   Note: Pt here for f/u. BP improved but still elevated in office today since increasing metoprolol at last office visit. However, pt has been noncompliant with lifestyle choices as recommended. Has gained 10 lbs in the last month. Kidney disease stable. Pt compliant with going to dialysis three times/week. Pt has hx of stroke. Has been using a cane to help aid his ambulation and states that he would like to start PT. All other medical conditions stable. No complaints.       1. Essential hypertension   BP improved, csm for now, DASH diet and exercise, low sodium intake, cont to monitor BP at home     high blood pressure: care instructions    learning about high blood pressure    Toprol  mg tablet,extended release     amlodipine 10 mg tablet     hydrochlorothiazide 25 mg tablet    2. End stage renal disease   cont dialysis, cont to f/u with nephrologist as scheduled, control BP     end-stage renal disease: care instructions   3. Vitamin D deficiency   cont monthly replacement    4. History of cerebrovascular accident   PT ordered at [facility] (3x/week for 4 weeks) to strengthen pt's gait     physical therapy referral    5. Muscle weakness   secondary to Hx of stroke, PT ordered at [facility] (3x/week for 4 weeks)

## 2018-10-10 DIAGNOSIS — Z76.82 ORGAN TRANSPLANT CANDIDATE: ICD-10-CM

## 2019-02-15 ENCOUNTER — TELEPHONE (OUTPATIENT)
Dept: VASCULAR SURGERY | Facility: CLINIC | Age: 44
End: 2019-02-15

## 2019-02-15 DIAGNOSIS — T82.868A THROMBOSIS OF ARTERIOVENOUS FISTULA, INITIAL ENCOUNTER: Primary | ICD-10-CM

## 2019-02-15 NOTE — TELEPHONE ENCOUNTER
Received call from Cindy at [facility] unit stating patient's HD access is clotted and he is unable to dialyze. States patient drank 16 oz H2O at 1000 but has not eaten. [facility] patient's last diaysis treatment was Wednesday 2/13/19. Notified Cindy nurse will speak with Dr. DERRICK De La Cruz, provider on call, and will contact her with response.

## 2019-02-15 NOTE — TELEPHONE ENCOUNTER
After discussing patient's AVF being clotted with Dr. De La Cruz and with Cindy ([facility]) patient has been scheduled for AVF declot on Monday 2/18/19 at 1230 in cath lab. Patient and Cindy notified. Pre-op instructions given to patient, patient verbalized understanding. Instructed patient to arrive at 1030 to cath lab waiting area. Pt verbalized understanding.

## 2019-02-18 ENCOUNTER — TELEPHONE (OUTPATIENT)
Dept: TRANSPLANT | Facility: CLINIC | Age: 44
End: 2019-02-18

## 2019-02-18 ENCOUNTER — HOSPITAL ENCOUNTER (OUTPATIENT)
Facility: HOSPITAL | Age: 44
Discharge: HOME | End: 2019-02-18
Attending: SURGERY | Admitting: SURGERY
Payer: MEDICARE

## 2019-02-18 VITALS
HEART RATE: 78 BPM | TEMPERATURE: 96 F | WEIGHT: 160 LBS | SYSTOLIC BLOOD PRESSURE: 145 MMHG | RESPIRATION RATE: 18 BRPM | OXYGEN SATURATION: 93 % | HEIGHT: 68 IN | DIASTOLIC BLOOD PRESSURE: 71 MMHG | BODY MASS INDEX: 24.25 KG/M2

## 2019-02-18 DIAGNOSIS — N18.6 END STAGE RENAL FAILURE ON DIALYSIS: Primary | ICD-10-CM

## 2019-02-18 DIAGNOSIS — Z76.82 ORGAN TRANSPLANT CANDIDATE: Primary | ICD-10-CM

## 2019-02-18 DIAGNOSIS — Z99.2 END STAGE RENAL FAILURE ON DIALYSIS: Primary | ICD-10-CM

## 2019-02-18 DIAGNOSIS — T82.868A THROMBOSIS OF ARTERIOVENOUS FISTULA, INITIAL ENCOUNTER: ICD-10-CM

## 2019-02-18 PROCEDURE — 99152 MOD SED SAME PHYS/QHP 5/>YRS: CPT | Mod: TXP | Performed by: SURGERY

## 2019-02-18 PROCEDURE — 99152 PR MOD CONSCIOUS SEDATION, SAME PHYS, 5+ YRS, FIRST 15 MIN: ICD-10-PCS | Mod: NTX,,, | Performed by: SURGERY

## 2019-02-18 PROCEDURE — 99152 MOD SED SAME PHYS/QHP 5/>YRS: CPT | Mod: NTX | Performed by: SURGERY

## 2019-02-18 PROCEDURE — 36901 INTRO CATH DIALYSIS CIRCUIT: CPT | Mod: TXP | Performed by: SURGERY

## 2019-02-18 PROCEDURE — 25500020 PHARM REV CODE 255: Mod: NTX | Performed by: SURGERY

## 2019-02-18 PROCEDURE — 99152 MOD SED SAME PHYS/QHP 5/>YRS: CPT | Mod: NTX,,, | Performed by: SURGERY

## 2019-02-18 PROCEDURE — C1894 INTRO/SHEATH, NON-LASER: HCPCS | Mod: TXP | Performed by: SURGERY

## 2019-02-18 PROCEDURE — 63600175 PHARM REV CODE 636 W HCPCS: Mod: TXP | Performed by: SURGERY

## 2019-02-18 PROCEDURE — 36901 PR INTRO CATH, DIALYSIS CIRCUIT: ICD-10-PCS | Mod: GC,NTX,, | Performed by: SURGERY

## 2019-02-18 PROCEDURE — 36901 INTRO CATH DIALYSIS CIRCUIT: CPT | Mod: GC,NTX,, | Performed by: SURGERY

## 2019-02-18 RX ORDER — FENTANYL CITRATE 50 UG/ML
INJECTION, SOLUTION INTRAMUSCULAR; INTRAVENOUS
Status: DISCONTINUED | OUTPATIENT
Start: 2019-02-18 | End: 2019-02-18 | Stop reason: HOSPADM

## 2019-02-18 RX ORDER — CEFAZOLIN SODIUM 1 G/3ML
2 INJECTION, POWDER, FOR SOLUTION INTRAMUSCULAR; INTRAVENOUS
Status: COMPLETED | OUTPATIENT
Start: 2019-02-18 | End: 2023-01-05

## 2019-02-18 RX ORDER — MIDAZOLAM HYDROCHLORIDE 1 MG/ML
INJECTION, SOLUTION INTRAMUSCULAR; INTRAVENOUS
Status: DISCONTINUED | OUTPATIENT
Start: 2019-02-18 | End: 2019-02-18 | Stop reason: HOSPADM

## 2019-02-18 RX ORDER — HEPARIN SODIUM 1000 [USP'U]/ML
INJECTION, SOLUTION INTRAVENOUS; SUBCUTANEOUS
Status: DISCONTINUED | OUTPATIENT
Start: 2019-02-18 | End: 2019-02-18 | Stop reason: HOSPADM

## 2019-02-18 RX ORDER — HEPARIN SODIUM 200 [USP'U]/100ML
INJECTION, SOLUTION INTRAVENOUS
Status: DISCONTINUED | OUTPATIENT
Start: 2019-02-18 | End: 2019-02-18 | Stop reason: HOSPADM

## 2019-02-18 RX ORDER — SODIUM CHLORIDE 9 MG/ML
INJECTION, SOLUTION INTRAVENOUS CONTINUOUS
Status: DISCONTINUED | OUTPATIENT
Start: 2019-02-18 | End: 2021-02-10

## 2019-02-18 RX ORDER — LIDOCAINE HYDROCHLORIDE 10 MG/ML
1 INJECTION, SOLUTION EPIDURAL; INFILTRATION; INTRACAUDAL; PERINEURAL ONCE
Status: DISPENSED | OUTPATIENT
Start: 2019-02-18

## 2019-02-18 NOTE — H&P
Haroldo Dickinson  02/18/2019    Vascular Surgery H/P Note    CC: Right Mihir AVF malfunction    HPI:  Patient is a 43 y.o. male with a h/o EDRD on HD via right mihir AVF (M/W/F) who is here today because of AVF malfunction.   Last HD Wednesday.     No intervention or f/u for >3 yrs with Dr. De La Cruz.   States its been working fine until couple of days ago.         Past Medical History:   Diagnosis Date    Diabetes mellitus     Dialysis patient     DM type 2 causing renal disease, not at goal     ESRD (end stage renal disease) started dialysis 01/2014 6/5/2014    Hyperparathyroidism, secondary renal 6/5/2014    Hypertension     NSTEMI (non-ST elevated myocardial infarction) 12/21/2013    Organ transplant candidate 6/5/2014    Pneumonia     Renal hypertension     Stroke      Past Surgical History:   Procedure Laterality Date    COLONOSCOPY N/A 5/3/2017    Performed by Rufino Carpenter MD at [facility] ENDO (4TH FLR)    QEFOJUCCRQPT-DIQHYTK-HF Right 5/20/2014    Performed by Yaneth De La Cruz MD at [facility] OR 2ND FLR    DIALYSIS FISTULA CREATION      ESOPHAGOGASTRODUODENOSCOPY (EGD) N/A 5/3/2017    Performed by Rufino Carpenter MD at [facility] ENDO (4TH FLR)    FISTULOGRAM Right 6/4/2015    Performed by Ye Leonardo MD at [facility] CATH LAB    INSERTION-CATHETER-PERM-A-CATH Left 12/27/2013    Performed by Joshua Goldberg, MD at [facility] OR 2ND FLR    PERMCATH REMOVAL-TUNNELED CVC REMOVAL Right 8/28/2014    Performed by Ye Leonardo MD at [facility] CATH LAB    VASCULAR SURGERY       Family History   Problem Relation Age of Onset    Hypertension Mother     Diabetes Mother     Hypertension Father     Hypertension Sister     Kidney disease Brother     Hypertension Brother     Heart disease Brother     Cancer Neg Hx     Colon cancer Neg Hx     Esophageal cancer Neg Hx     Stomach cancer Neg Hx     Rectal cancer Neg Hx     Ulcerative colitis Neg Hx     Irritable bowel syndrome Neg Hx     Crohn's disease Neg Hx      Celiac disease Neg Hx      Social History     Socioeconomic History    Marital status: Single     Spouse name: Not on file    Number of children: Not on file    Years of education: Not on file    Highest education level: Not on file   Social Needs    Financial resource strain: Not on file    Food insecurity - worry: Not on file    Food insecurity - inability: Not on file    Transportation needs - medical: Not on file    Transportation needs - non-medical: Not on file   Occupational History     Employer: [employer]   Tobacco Use    Smoking status: Never Smoker    Smokeless tobacco: Never Used   Substance and Sexual Activity    Alcohol use: No    Drug use: No    Sexual activity: Yes     Partners: Female     Birth control/protection: None   Other Topics Concern    Not on file   Social History Narrative    Disabled     for one year    One son age 11    History of blood transfusion     Review of patient's allergies indicates:   Allergen Reactions    No known allergies        Current Facility-Administered Medications:     0.9%  NaCl infusion, , Intravenous, Continuous, Michelle Spence MD    ceFAZolin injection 2 g, 2 g, Intravenous, On Call Procedure, Michelle Spence MD    lidocaine (PF) 10 mg/ml (1%) injection 10 mg, 1 mL, Intradermal, Once, Michelle Spence MD    REVIEW OF SYSTEMS:  General: negative;   ENT: negative;   Allergy and Immunology: negative;   Hematological and Lymphatic: Negative;   Endocrine: negative;   Respiratory: no cough, shortness of breath, or wheezing;   Cardiovascular: no chest pain or dyspnea on exertion;   Gastrointestinal: no abdominal pain/back, change in bowel habits, or bloody stools;   Genito-Urinary: no dysuria, trouble voiding, or hematuria;   Musculoskeletal: negative  Neurological: no TIA or stroke symptoms;   Psychiatric: no nervousness, anxiety or depression.    PHYSICAL EXAM:      Pulse: 78  Temp: 97.4 °F (36.3 °C)      General  appearance:  Alert, well-appearing, and in no distress.  Oriented to person, place, and time   Neurological:  Normal speech, no focal findings noted; CN II - XII grossly intact           Musculoskeletal: Digits/nail without cyanosis/clubbing.  Normal muscle strength/tone.                 Neck: Supple, no significant adenopathy; thyroid is not enlarged                Chest:  Clear to auscultation, no wheezes, rales or rhonchi, symmetric air entry      No use of accessory muscles             Cardiac: Normal rate and regular rhythm, S1 and S2 normal; PMI non-displaced          Abdomen: Soft, nontender, nondistended, no masses or organomegaly      No rebound tenderness noted; bowel sounds normal      Extremities:   Right Mihir AVF without thrill      No significant aneurysmal degeneration      2+ radial pulse    LAB RESULTS:  Lab Results   Component Value Date    K 4.1 07/17/2017    K 4.9 05/03/2017    K 3.4 (L) 03/04/2017    CREATININE 15.4 (H) 07/17/2017    CREATININE 6.0 (H) 03/04/2017    CREATININE 6.8 (H) 02/23/2017     Lab Results   Component Value Date    WBC 8.98 03/04/2017    WBC 7.25 02/23/2017    WBC 7.94 09/21/2015    HCT 23.7 (L) 03/04/2017    HCT 25.7 (L) 02/23/2017    HCT 41.6 09/21/2015     (H) 03/04/2017     (H) 02/23/2017     (H) 09/21/2015     Lab Results   Component Value Date    HGBA1C 4.4 (L) 06/05/2014    HGBA1C 5.9 12/16/2013       IMAGING:  none    IMP/PLAN:  43 y.o. male with  EDRD on HD via right mihir AVF (M/W/F) who is here today because of loss of palpable thrill on AVF.   Last HD Wednesday.     Right transradial Fistulagram/declot vs. permacath placement.   Consent obtained.       Juno Zamora MD  Vascular/Endovascular Surgery Fellow

## 2019-02-18 NOTE — PLAN OF CARE
Problem: Fall Injury Risk  Goal: Absence of Fall and Fall-Related Injury  Outcome: Ongoing (interventions implemented as appropriate)  Received report from PORTIA Foster. Patient s/p fistulagram, AAOx3. VSS, no c/o pain or discomfort at this time, resp even and unlabored. Gauze/tegaderm dressing to r forearm is CDI. No active bleeding. No hematoma noted. Post procedure protocol reviewed with patient and patient's family. Understanding verbalized. Family members at bedside. Nurse call bell within reach. Will continue to monitor per post procedure protocol.

## 2019-02-18 NOTE — PLAN OF CARE
Problem: Fall Injury Risk  Goal: Absence of Fall and Fall-Related Injury  Outcome: Ongoing (interventions implemented as appropriate)  Plan of care and safety prec initiated. Will continue to monitor.

## 2019-02-18 NOTE — BRIEF OP NOTE
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  Resume medications per post-procedure med reconciliation.      Medications:  Reconciled Home Medications:      Medication List      CONTINUE taking these medications    amLODIPine 10 MG tablet  Commonly known as:  NORVASC  Take 10 mg by mouth once daily.     aspirin 81 MG Chew  Take 1 tablet (81 mg total) by mouth once daily.     atorvastatin 20 MG tablet  Commonly known as:  LIPITOR  Take 1 tablet (20 mg total) by mouth once daily.     dorzolamide 2 % ophthalmic solution  Commonly known as:  TRUSOPT  1 drop 3 (three) times daily.     PRORENAL 8 mg iron-800 mcg-1,000 unit Tab  Generic drug:  vit B,C-iron fum-FA-D3-zinc ox  Take 1 tablet by mouth once daily.     RENVELA 800 mg Tab  Generic drug:  sevelamer carbonate  800 mg 3 (three)  times daily with meals.     SENSIPAR 30 MG Tab  Generic drug:  cinacalcet  Take 30 mg by mouth before dinner.     TOPROL  MG 24 hr tablet  Generic drug:  metoprolol succinate  Toprol  mg tablet,extended release   Take 1 tablet every day by oral route.     valsartan 80 MG tablet  Commonly known as:  DIOVAN  Take 1 tablet (80 mg total) by mouth once daily.          Discharge Procedure Orders   Diet general     Call MD for:  redness, tenderness, or signs of infection (pain, swelling, redness, odor or green/yellow discharge around incision site)     Remove dressing in 48 hours     Follow-up Information     Yaneth De La Cruz MD In 1 week.    Specialties:  Vascular Surgery, General Surgery  Why:  with hemodialysis US  Contact information:  [address]  [facility] [address] [phone]

## 2019-02-18 NOTE — Clinical Note
10 ml injected throughout the case. 90 mL total wasted during the case. 100 mL total used in the case.

## 2019-02-18 NOTE — TELEPHONE ENCOUNTER
Call placed to [facility] HD unit to verify start date as  6/23/17. His nephrologist is DR Pastrana. Will change in epic.

## 2019-02-18 NOTE — DISCHARGE SUMMARY
[facility]  Discharge Note  Short Stay    Admit Date: 2/18/2019    Discharge Date and Time: No discharge date for patient encounter.     Attending Physician: Yaneth De La Cruz MD     Discharge Provider: Michelle Spence    Diagnoses:  Active Hospital Problems    Diagnosis  POA    *Thrombosis of arteriovenous fistula [T82.868A]  Yes      Resolved Hospital Problems   No resolved problems to display.       Discharged Condition: good    Hospital Course: Patient was admitted for an outpatient procedure and tolerated the procedure well with no complications.    Final Diagnoses: Same as principal problem.    Disposition: Home or Self Care    Follow up/Patient Instructions:    Medications:  Reconciled Home Medications:      Medication List      CONTINUE taking these medications    amLODIPine 10 MG tablet  Commonly known as:  NORVASC  Take 10 mg by mouth once daily.     aspirin 81 MG Chew  Take 1 tablet (81 mg total) by mouth once daily.     atorvastatin 20 MG tablet  Commonly known as:  LIPITOR  Take 1 tablet (20 mg total) by mouth once daily.     dorzolamide 2 % ophthalmic solution  Commonly known as:  TRUSOPT  1 drop 3 (three) times daily.     PRORENAL 8 mg iron-800 mcg-1,000 unit Tab  Generic drug:  vit B,C-iron fum-FA-D3-zinc ox  Take 1 tablet by mouth once daily.     RENVELA 800 mg Tab  Generic drug:  sevelamer carbonate  800 mg 3 (three) times daily with meals.     SENSIPAR 30 MG Tab  Generic drug:  cinacalcet  Take 30 mg by mouth before dinner.     TOPROL  MG 24 hr tablet  Generic drug:  metoprolol succinate  Toprol  mg tablet,extended release   Take 1 tablet every day by oral route.     valsartan 80 MG tablet  Commonly known as:  DIOVAN  Take 1 tablet (80 mg total) by mouth once daily.          Discharge Procedure Orders   Diet general     Call MD for:  redness, tenderness, or signs of infection (pain, swelling, redness, odor or green/yellow discharge around incision site)     Remove dressing  in 48 hours     Follow-up Information     Yaneth De La Cruz MD In 1 week.    Specialties:  Vascular Surgery, General Surgery  Why:  with hemodialysis US  Contact information:  Johnny HERNANDEZ  [facility] [id number] [phone]                   Discharge Procedure Orders (must include Diet, Follow-up, Activity):   Discharge Procedure Orders (must include Diet, Follow-up, Activity)   Diet general     Call MD for:  redness, tenderness, or signs of infection (pain, swelling, redness, odor or green/yellow discharge around incision site)     Remove dressing in 48 hours      Michelle Spence MD, PGY-3  General Surgery  [phone]

## 2019-02-19 NOTE — OP NOTE
DATE OF PROCEDURE:  02/18/2019    PREOPERATIVE DIAGNOSIS:  Thrombosis of right radiocephalic AV fistula.    POSTOPERATIVE DIAGNOSIS:  Patent right radiocephalic AV fistula.    PROCEDURES PERFORMED:  1.  Right arm fistulogram.  2.  Moderate sedation, 30 minutes.    SURGEON:  Yaneth De La Cruz M.D.    ASSISTANT:  Michelle Spence M.D. (RES)    ANESTHESIA:  Local plus IV sedation.    FINDINGS:  Patent right radiocephalic AV fistula.  Right upper arm cephalic vein   occluded with outflow via the basilic system.  No central vein stenosis.    ESTIMATED BLOOD LOSS:  Less than 2 mL.    COMPLICATIONS:  None.    SPECIMEN:  None.    DISPOSITION:  Home.    HISTORY:  Mr. Dickinson is a 43-year-old gentleman dialyzing through a right   radiocephalic AV fistula.  He went to his dialysis unit last Friday.  They   attempted access via two large buttonholes on his right forearm and withdrew   clots.  He was then sent to the Emergency Room and our office was notified of   possible thrombosed AV fistula.  He was discharged and told to return as an   outpatient for fistulogram.  On exam prior to the procedure, he was noted to   have a thrill proximally and distally with a weak thrill through the area of   cannulation.  He was counseled on benefits and risks of right arm fistulogram.    He understood the risk and gave informed consent to proceed.    PROCEDURE IN DETAIL:  After obtaining consents brought to the Cath Lab.    Sedation was administered.  I was present throughout the administration of   moderate sedation.  Total moderate sedation time was approximately 30 minutes.    I monitored the patient's cardiorespiratory status throughout.  Using a 4-Finnish   micropuncture catheter, we accessed the fistula near the radiocephalic   anastomosis at the wrist.  We exchanged for a 6-Finnish angiographic sheath using   Seldinger technique.  We performed a fistulogram, which showed the fistula to   be widely patent.  There are multiple large veins at  the level of the wrist;   however, there is no intrinsic stenosis through the forearm.  The level of the   antecubital fossa of the upper arm cephalic vein was occluded and all drainage   was through the basilic system.  Centrally, there was not any axillary,   subclavian or innominate stenosis that we could identify.  The fistula was again   examined.  There was a good thrill in the distal third near the wrist and the   upper third near the antecubital fossa.  The wires and catheters were removed.    The access point was closed with a single stitch.  The fistula is cleared for   use.      TAS/HN  dd: 02/18/2019 14:07:25 (CST)  td: 02/18/2019 19:02:06 (CST)  Doc ID   #[id number]  Job ID #[id number]    CC:

## 2019-02-25 ENCOUNTER — TELEPHONE (OUTPATIENT)
Dept: VASCULAR SURGERY | Facility: CLINIC | Age: 44
End: 2019-02-25

## 2019-02-25 NOTE — TELEPHONE ENCOUNTER
Contacted patient in response to message stating he needs to reschedule appts with vascular lab and with Dr. De La Cruz due to having dialysis today. Appts rescheduled, pt verified. Asks if sutures will fall out. Notified patient sutures are removed during clinic visit, but that nurse can ask Dr. De La Cruz if he is comfortable having dialysis nurse remove sutures. Pt states this will be fine. Notified patient Dr. De La Cruz states sutures may be removed at dialysis unit. Pt verbalized understanding. Appt letter placed in mail.

## 2019-03-11 ENCOUNTER — OFFICE VISIT (OUTPATIENT)
Dept: VASCULAR SURGERY | Facility: CLINIC | Age: 44
End: 2019-03-11
Payer: MEDICARE

## 2019-03-11 ENCOUNTER — HOSPITAL ENCOUNTER (OUTPATIENT)
Dept: VASCULAR SURGERY | Facility: CLINIC | Age: 44
Discharge: HOME | End: 2019-03-11
Attending: SURGERY
Payer: MEDICARE

## 2019-03-11 VITALS
HEIGHT: 68 IN | SYSTOLIC BLOOD PRESSURE: 170 MMHG | DIASTOLIC BLOOD PRESSURE: 85 MMHG | WEIGHT: 178.56 LBS | HEART RATE: 68 BPM | TEMPERATURE: 98 F | BODY MASS INDEX: 27.06 KG/M2

## 2019-03-11 DIAGNOSIS — N18.6 END STAGE RENAL FAILURE ON DIALYSIS: Primary | Chronic | ICD-10-CM

## 2019-03-11 DIAGNOSIS — Z99.2 END STAGE RENAL FAILURE ON DIALYSIS: ICD-10-CM

## 2019-03-11 DIAGNOSIS — N18.6 END STAGE RENAL FAILURE ON DIALYSIS: ICD-10-CM

## 2019-03-11 DIAGNOSIS — Z99.2 END STAGE RENAL FAILURE ON DIALYSIS: Primary | Chronic | ICD-10-CM

## 2019-03-11 DIAGNOSIS — I69.351 HEMIPARESIS AFFECTING RIGHT SIDE AS LATE EFFECT OF CEREBROVASCULAR ACCIDENT: ICD-10-CM

## 2019-03-11 PROCEDURE — 99999 PR PBB SHADOW E&M-EST. PATIENT-LVL III: ICD-10-PCS | Mod: PBBFAC,TXP,, | Performed by: SURGERY

## 2019-03-11 PROCEDURE — 93990 DOPPLER FLOW TESTING: CPT | Mod: 26,S$PBB,TXP, | Performed by: SURGERY

## 2019-03-11 PROCEDURE — 99213 OFFICE O/P EST LOW 20 MIN: CPT | Mod: PBBFAC,TXP,25 | Performed by: SURGERY

## 2019-03-11 PROCEDURE — 93990 PR DUPLEX HEMODIALYSIS ACCESS: ICD-10-PCS | Mod: 26,S$PBB,TXP, | Performed by: SURGERY

## 2019-03-11 PROCEDURE — 99999 PR PBB SHADOW E&M-EST. PATIENT-LVL III: CPT | Mod: PBBFAC,TXP,, | Performed by: SURGERY

## 2019-03-11 PROCEDURE — 99213 PR OFFICE/OUTPT VISIT, EST, LEVL III, 20-29 MIN: ICD-10-PCS | Mod: S$PBB,NTX,, | Performed by: SURGERY

## 2019-03-11 PROCEDURE — 93990 DOPPLER FLOW TESTING: CPT | Mod: PBBFAC,TXP | Performed by: SURGERY

## 2019-03-11 PROCEDURE — 99213 OFFICE O/P EST LOW 20 MIN: CPT | Mod: S$PBB,NTX,, | Performed by: SURGERY

## 2019-03-11 RX ORDER — TADALAFIL 20 MG/1
TABLET ORAL
COMMUNITY
End: 2021-05-25

## 2019-03-11 NOTE — PROGRESS NOTES
Patient ID: Haroldo Dickinson is a 43 y.o. male.    I. HISTORY     Chief Complaint: No chief complaint on file.      HPI Haroldo Dickinson is a 43 y.o. male here s/p right radio-cephalic AVF placement on 5/20/14. He denies any pain/coldness/numbness/weakness in his right hand. Underwent fistulagram Feb 2019 for presumed AVF thrombosis. Upper arm cephalic occluded and outflow via basilic. Renal failure due to HTN and is currently dialyzing M/W/F via the AVF. No problems with HD. Patient is right handed. Has been on dialysis since December 2013. He had a hypertensive stroke in December and has some residual left leg weakness. Walks with a walker.     HD at [facility].  Last seen in clinic Dec 2014.    Dialyzing thruogh R RC AVF. Seen in ED on d/t return of clot at AV fistula on 2/13.. S/p fistulogram on 2/18/19. Fistulagram showed the fistula to be widely patent. Right upper arm cephalic vein occluded with outflow via the basilic system.  No central vein stenosis.    Review of Systems   Constitution: Negative.   HENT: Negative.    Eyes: Negative.    Cardiovascular: Negative for chest pain, claudication and dyspnea on exertion.   Respiratory: Negative for cough and shortness of breath.    Endocrine: Negative.    Hematologic/Lymphatic: Negative.    Skin: Negative.    Musculoskeletal: Positive for muscle weakness.   Gastrointestinal: Negative.    Neurological: Positive for disturbances in coordination, focal weakness and loss of balance.   Psychiatric/Behavioral: Negative.        II. PHYSICAL EXAM     Physical Exam   Constitutional: He is oriented to person, place, and time. He appears well-developed and well-nourished.   HENT:   Head: Normocephalic and atraumatic.   Eyes: Conjunctivae and EOM are normal.   Neck: Neck supple. No JVD present.   Cardiovascular: Normal rate, regular rhythm and normal heart sounds.   Good thrill at the radio-cephalic transposition fistula. 1+ ulnar pulse and 2+ radial pulse on right.    Pulmonary/Chest: Effort normal. No respiratory distress.   Abdominal: Soft. He exhibits no distension.   Musculoskeletal: Normal range of motion. He exhibits no edema.   Neurological: He is alert and oriented to person, place, and time.   Skin: Skin is warm and dry.   Psychiatric: He has a normal mood and affect.   Vitals reviewed.      III. ASSESSMENT & PLAN (MEDICAL DECISION MAKING)     1. End stage renal failure on dialysis    2. Hemiparesis affecting right side as late effect of cerebrovascular accident        Imaging Results:  Fistula Ultrasound Jan 2019:  Flow: 827 ml/min  No evidence of hemodynamically significant stenosis. Upper arm cephal;ic vein thrombosed, outflow via Basilic system    Assessment/Diagnosis and Plan:  Haroldo Dickinson is a 43 y.o. male with ESRD via right RC AVF  Functioning right RC AVF with good flow and no stenosis. No issues with HD. F/U in 6 months    No problems with AVF  See me in 4-6 months with duplex.    Yaneth De La Cruz MD FACS [facility]  Vascular & Endovascular Surgery

## 2019-03-28 ENCOUNTER — CLINICAL SUPPORT (OUTPATIENT)
Dept: CARDIOLOGY | Facility: CLINIC | Age: 44
End: 2019-03-28
Payer: MEDICARE

## 2019-03-28 VITALS — BODY MASS INDEX: 26.98 KG/M2 | HEIGHT: 68 IN | WEIGHT: 178 LBS

## 2019-03-28 DIAGNOSIS — Z76.82 ORGAN TRANSPLANT CANDIDATE: ICD-10-CM

## 2019-03-28 LAB
CV STRESS BASE HR: 62 BPM
DIASTOLIC BLOOD PRESSURE: 80 MMHG
END DIASTOLIC INDEX-HIGH: 171 ML/M2
END SYSTOLIC INDEX-HIGH: 70 ML/M2
NUC REST DIASTOLIC VOLUME INDEX: 73
NUC REST EJECTION FRACTION: 75
NUC REST SYSTOLIC VOLUME INDEX: 18
OHS CV CPX 1 MINUTE RECOVERY HEART RATE: 101 BPM
OHS CV CPX 85 PERCENT MAX PREDICTED HEART RATE MALE: 152
OHS CV CPX MAX PREDICTED HEART RATE: 179
OHS CV CPX PATIENT IS FEMALE: 0
OHS CV CPX PATIENT IS MALE: 1
OHS CV CPX PEAK DIASTOLIC BLOOD PRESSURE: 80 MMHG
OHS CV CPX PEAK HEAR RATE: 77 BPM
OHS CV CPX PEAK RATE PRESSURE PRODUCT: NORMAL
OHS CV CPX PEAK SYSTOLIC BLOOD PRESSURE: 155 MMHG
OHS CV CPX PERCENT MAX PREDICTED HEART RATE ACHIEVED: 43
OHS CV CPX RATE PRESSURE PRODUCT PRESENTING: 9610
RETIRED EF AND QEF - SEE NOTES: 51 %
SYSTOLIC BLOOD PRESSURE: 155 MMHG

## 2019-03-28 PROCEDURE — 78452 STRESS TEST WITH MYOCARDIAL PERFUSION (CUPID ONLY): ICD-10-PCS | Mod: 26,TXP,, | Performed by: INTERNAL MEDICINE

## 2019-03-28 PROCEDURE — 99999 PR PBB SHADOW E&M-EST. PATIENT-LVL I: CPT | Mod: PBBFAC,TXP,,

## 2019-03-28 PROCEDURE — 99999 PR PBB SHADOW E&M-EST. PATIENT-LVL I: ICD-10-PCS | Mod: PBBFAC,TXP,,

## 2019-03-28 PROCEDURE — 93018 STRESS TEST WITH MYOCARDIAL PERFUSION (CUPID ONLY): ICD-10-PCS | Mod: TXP,,, | Performed by: INTERNAL MEDICINE

## 2019-03-28 PROCEDURE — A9502 TC99M TETROFOSMIN: HCPCS | Mod: PBBFAC,TXP | Performed by: INTERNAL MEDICINE

## 2019-03-28 PROCEDURE — 93016 STRESS TEST WITH MYOCARDIAL PERFUSION (CUPID ONLY): ICD-10-PCS | Mod: TXP,,, | Performed by: INTERNAL MEDICINE

## 2019-03-28 PROCEDURE — 99211 OFF/OP EST MAY X REQ PHY/QHP: CPT | Mod: PBBFAC,TXP,25

## 2019-03-28 PROCEDURE — 78452 HT MUSCLE IMAGE SPECT MULT: CPT | Mod: 26,TXP,, | Performed by: INTERNAL MEDICINE

## 2019-03-28 PROCEDURE — 93018 CV STRESS TEST I&R ONLY: CPT | Mod: TXP,,, | Performed by: INTERNAL MEDICINE

## 2019-03-28 PROCEDURE — 93016 CV STRESS TEST SUPVJ ONLY: CPT | Mod: TXP,,, | Performed by: INTERNAL MEDICINE

## 2019-03-28 RX ORDER — REGADENOSON 0.08 MG/ML
0.4 INJECTION, SOLUTION INTRAVENOUS
Status: COMPLETED | OUTPATIENT
Start: 2019-03-28 | End: 2019-03-28

## 2019-03-28 RX ORDER — AMINOPHYLLINE 25 MG/ML
75 INJECTION, SOLUTION INTRAVENOUS
Status: COMPLETED | OUTPATIENT
Start: 2019-03-28 | End: 2019-03-28

## 2019-03-28 RX ADMIN — REGADENOSON 0.4 MG: 0.08 INJECTION, SOLUTION INTRAVENOUS at 10:03

## 2019-03-28 RX ADMIN — AMINOPHYLLINE 75 MG: 25 INJECTION, SOLUTION INTRAVENOUS at 10:03

## 2019-03-29 ENCOUNTER — TELEPHONE (OUTPATIENT)
Dept: TRANSPLANT | Facility: CLINIC | Age: 44
End: 2019-03-29

## 2019-03-29 NOTE — TELEPHONE ENCOUNTER
Will be sent to cardiology for abn stress test       ----- Message from Valery Mcnamara RN sent at 3/28/2019  3:50 PM CDT -----      ----- Message -----  From: Cordelia Singer NP  Sent: 3/28/2019   3:45 PM  To: Kidney Waitlisted Coordinator    Results reviewed, action needed.  Currently inactive  Needs cardiology clearance

## 2019-04-09 PROBLEM — R94.39 ABNORMAL CARDIOVASCULAR STRESS TEST: Status: ACTIVE | Noted: 2019-04-09

## 2019-07-09 ENCOUNTER — INITIAL CONSULT (OUTPATIENT)
Dept: CARDIOLOGY | Facility: CLINIC | Age: 44
End: 2019-07-09
Payer: MEDICARE

## 2019-07-09 ENCOUNTER — DOCUMENTATION ONLY (OUTPATIENT)
Dept: CARDIOLOGY | Facility: CLINIC | Age: 44
End: 2019-07-09

## 2019-07-09 VITALS
HEIGHT: 68 IN | OXYGEN SATURATION: 97 % | BODY MASS INDEX: 27.33 KG/M2 | DIASTOLIC BLOOD PRESSURE: 69 MMHG | HEART RATE: 69 BPM | SYSTOLIC BLOOD PRESSURE: 122 MMHG | WEIGHT: 180.31 LBS

## 2019-07-09 DIAGNOSIS — Z99.2 END STAGE RENAL FAILURE ON DIALYSIS: Primary | Chronic | ICD-10-CM

## 2019-07-09 DIAGNOSIS — N18.6 END STAGE RENAL FAILURE ON DIALYSIS: Primary | Chronic | ICD-10-CM

## 2019-07-09 DIAGNOSIS — Z76.82 ORGAN TRANSPLANT CANDIDATE: ICD-10-CM

## 2019-07-09 DIAGNOSIS — R94.39 POSITIVE CARDIAC STRESS TEST: Primary | ICD-10-CM

## 2019-07-09 PROCEDURE — 99213 OFFICE O/P EST LOW 20 MIN: CPT | Mod: PBBFAC,TXP | Performed by: INTERNAL MEDICINE

## 2019-07-09 PROCEDURE — 99204 PR OFFICE/OUTPT VISIT, NEW, LEVL IV, 45-59 MIN: ICD-10-PCS | Mod: S$PBB,TXP,, | Performed by: INTERNAL MEDICINE

## 2019-07-09 PROCEDURE — 99999 PR PBB SHADOW E&M-EST. PATIENT-LVL III: ICD-10-PCS | Mod: PBBFAC,TXP,, | Performed by: INTERNAL MEDICINE

## 2019-07-09 PROCEDURE — 99204 OFFICE O/P NEW MOD 45 MIN: CPT | Mod: S$PBB,TXP,, | Performed by: INTERNAL MEDICINE

## 2019-07-09 PROCEDURE — 99999 PR PBB SHADOW E&M-EST. PATIENT-LVL III: CPT | Mod: PBBFAC,TXP,, | Performed by: INTERNAL MEDICINE

## 2019-07-09 RX ORDER — SODIUM CHLORIDE 0.9 % (FLUSH) 0.9 %
10 SYRINGE (ML) INJECTION
Status: DISCONTINUED | OUTPATIENT
Start: 2019-07-09 | End: 2019-07-25 | Stop reason: HOSPADM

## 2019-07-09 RX ORDER — SODIUM CHLORIDE 9 MG/ML
INJECTION, SOLUTION INTRAVENOUS ONCE
Status: CANCELLED | OUTPATIENT
Start: 2019-07-09 | End: 2019-07-09

## 2019-07-09 RX ORDER — ATORVASTATIN CALCIUM 40 MG/1
40 TABLET, FILM COATED ORAL DAILY
Qty: 90 TABLET | Refills: 3 | Status: SHIPPED | OUTPATIENT
Start: 2019-07-09 | End: 2019-11-22 | Stop reason: SDUPTHER

## 2019-07-09 RX ORDER — CLOPIDOGREL BISULFATE 75 MG/1
75 TABLET ORAL ONCE
Qty: 5 TABLET | Refills: 0 | Status: ON HOLD | OUTPATIENT
Start: 2019-07-09 | End: 2019-07-25 | Stop reason: SDUPTHER

## 2019-07-09 NOTE — ASSESSMENT & PLAN NOTE
- Positive stress test SPECT is a small sized, mild intensity reversible defect in the inferoapical wall comprising 6% of the LV myocardium. Summed stress score is 2.  - Plan for LHC +/- PCI R CFA Access  - ASA, High dose statin   -The risks, benefits & alternatives of the procedure were explained to the patient.    -The risks of coronary angiography include but are not limited to:  Bleeding, infection, heart rhythm abnormalities, allergic reactions, kidney injury, stroke and death.    -Should stenting be indicated, the patient has agreed to dual anti-platelet therapy for 1-consecutive year with a drug-eluting stent and a minimum of 1-month with the use of a bare metal stent.    -The risks of moderate sedation include hypotension, respiratory depression, arrhythmias, bronchospasm, & death.    -Informed consent was obtained & the patient is agreeable to proceed with the procedure.

## 2019-07-09 NOTE — LETTER
July 9, 2019      Cordelia Singer, ESPERANZA  [address]  [facility]  1st Floor Clinic  [facility] [address]           Kofi jorge-Interventional Card  [address]  [facility] [address]  Phone: [phone]          Patient: Haroldo Dickinson   MR Number: [id number]   YOB: 1975   Date of Visit: 7/9/2019       Dear Cordelia Singer:    Thank you for referring Haroldo Dickinson to me for evaluation. Attached you will find relevant portions of my assessment and plan of care.    If you have questions, please do not hesitate to call me. I look forward to following Haroldo Dickinson along with you.    Sincerely,    Lucille Ball MD    Enclosure  CC:  No Recipients    If you would like to receive this communication electronically, please contact [email or web address] or [phone] to request more information on [employer] access.    For providers and/or their staff who would like to refer a patient to [facility], please contact us through our one-stop-shop provider referral line, [facility], at [phone].    If you feel you have received this communication in error or would no longer like to receive these types of communications, please e-mail [email or web address]

## 2019-07-09 NOTE — PROGRESS NOTES
Subjective:    Patient ID:  Haroldo Dickinson is a 43 y.o. male who presents for evaluation of Positive stress test.    HPI  44 yo M PMH of HTN, CVA, ESRD on HD MWF p/w positive SPECT stress done for preop clearance for kidney transplant. He denies any CP, SOB or OSEGUERA. He is euvolemic. Uses a cane due to residual weakness from CVA.   Review of Systems   Constitution: Negative for chills, decreased appetite and diaphoresis.   HENT: Negative for congestion and ear discharge.    Eyes: Negative for blurred vision and discharge.   Cardiovascular: Negative for chest pain, dyspnea on exertion, irregular heartbeat, leg swelling and paroxysmal nocturnal dyspnea.   Respiratory: Negative for cough, hemoptysis and shortness of breath.    Gastrointestinal: Negative for abdominal pain.        Objective:    Physical Exam   Constitutional: He is oriented to person, place, and time. He appears well-developed and well-nourished. No distress.   Eyes: Pupils are equal, round, and reactive to light. Conjunctivae are normal.   Neck: No tracheal deviation present. No thyromegaly present.   Cardiovascular: Normal rate, regular rhythm, normal heart sounds and intact distal pulses. Exam reveals no gallop and no friction rub.   No murmur heard.  Pulses:       Radial pulses are 1+ on the left side.        Femoral pulses are 2+ on the right side, and 2+ on the left side.  RUE Fistula   Pulmonary/Chest: Effort normal and breath sounds normal. No respiratory distress. He has no wheezes. He has no rales.   Abdominal: Soft. Bowel sounds are normal. He exhibits no distension. There is no tenderness.   Musculoskeletal: He exhibits no edema or deformity.   Neurological: He is alert and oriented to person, place, and time. No cranial nerve deficit. Coordination normal.   Skin: Skin is warm and dry. He is not diaphoretic.   Psychiatric: He has a normal mood and affect. His behavior is normal.         Assessment:       1. Positive cardiac stress test    2.  Organ transplant candidate    3. ERRONEOUS ENCOUNTER--DISREGARD         Plan:       Positive cardiac stress test  - Positive stress test SPECT is a small sized, mild intensity reversible defect in the inferoapical wall comprising 6% of the LV myocardium. Summed stress score is 2.  - Plan for LHC +/- PCI R CFA Access  - ASA, High dose statin   -The risks, benefits & alternatives of the procedure were explained to the patient.    -The risks of coronary angiography include but are not limited to:  Bleeding, infection, heart rhythm abnormalities, allergic reactions, kidney injury, stroke and death.    -Should stenting be indicated, the patient has agreed to dual anti-platelet therapy for 1-consecutive year with a drug-eluting stent and a minimum of 1-month with the use of a bare metal stent.    -The risks of moderate sedation include hypotension, respiratory depression, arrhythmias, bronchospasm, & death.    -Informed consent was obtained & the patient is agreeable to proceed with the procedure.       Hypertension  - Continue current medication    Hyperlipidemia  - High dose statin    CVA  - Stable

## 2019-07-09 NOTE — PROGRESS NOTES
OUTPATIENT CATHETERIZATION INSTRUCTIONS    You have been scheduled for a procedure in the catheterization lab on Thursday, July 25, 2019.     Please report to the Cardiology Waiting Area on the Third floor of the hospital and check in at 9 AM.   You will then be taken to the SSCU (Short Stay Cardiac Unit) and prepared for your procedure. Please be aware that this is not the time of your procedure but the time you are to arrive. The procedures are scheduled on an hourly basis; however, emergency cases take precedence over all other cases.       You may not have anything to eat or drink after midnight the night before your test. You may take your regular morning medications with water. If there are any medications that you should not take you will be instructed to hold them that morning. If you are diabetic and on Metformin (Glucophage) do not take it the day before, the day of, and for 2 days after your procedure.      The procedure will take 1-2 hours to perform. After the procedure, you will return to SSCU on the third floor of the hospital. You will need to lie still (or keep your arm still) for the next 4 to 6 hours to minimize bleeding from the puncture site. Your family may remain in the room with you during this time.       You may be able to be discharged home that same afternoon if there is someone to drive you home and there were no complications. If you have one of the balloon, stent, or device procedures you may spend the night in the hospital. Your doctor will determine, based on your progress, the date and time of your discharge. The results of your procedure will be discussed with you before you are discharged. Any further testing or procedures will be scheduled for you either before you leave or you will be called with these appointments.       If you should have any questions, concerns, or need to change the date of your procedure, please call PORTIA Singh @ [phone]    Special  Instructions:  Take 4 Plavix pills the day before your procedure and 1 pill the morning of your procedure before coming to the hospital.    THE ABOVE INSTRUCTIONS WERE GIVEN TO THE PATIENT VERBALLY AND THEY VERBALIZED UNDERSTANDING.  THEY DO NOT REQUIRE ANY SPECIAL NEEDS AND DO NOT HAVE ANY LEARNING BARRIERS.          Directions for Reporting to Cardiology Waiting Area in the Hospital  If you park in the Parking Garage:  Take elevators to the1st floor of the parking garage.  Continue past the gift shop, coffee shop, and piano.  Take a right and go to the gold elevators. (Elevator B)  Take the elevator to the 3rd floor.  Follow the arrow on the sign on the wall that says Cath Lab Registration/EP Lab Registration.  Follow the long hallway all the way around until you come to a big open area.  This is the registration area.  Check in at Reception Desk.    OR    If family is dropping you off:  Have them drop you off at the front of the Hospital under the green overhang.  Enter through the doors and take a right.  Take the E elevators to the 3rd floor Cardiology Waiting Area.  Check in at the Reception Desk in the waiting room.

## 2019-07-15 DIAGNOSIS — N18.6 END STAGE RENAL FAILURE ON DIALYSIS: Primary | ICD-10-CM

## 2019-07-15 DIAGNOSIS — Z99.2 END STAGE RENAL FAILURE ON DIALYSIS: Primary | ICD-10-CM

## 2019-07-16 ENCOUNTER — HOSPITAL ENCOUNTER (OUTPATIENT)
Dept: VASCULAR SURGERY | Facility: CLINIC | Age: 44
Discharge: HOME | End: 2019-07-16
Attending: SURGERY
Payer: MEDICARE

## 2019-07-16 DIAGNOSIS — N18.6 END STAGE RENAL FAILURE ON DIALYSIS: ICD-10-CM

## 2019-07-16 DIAGNOSIS — Z99.2 END STAGE RENAL FAILURE ON DIALYSIS: ICD-10-CM

## 2019-07-16 PROCEDURE — 93990 PR DUPLEX HEMODIALYSIS ACCESS: ICD-10-PCS | Mod: 26,S$PBB,TXP, | Performed by: SURGERY

## 2019-07-16 PROCEDURE — 93990 DOPPLER FLOW TESTING: CPT | Mod: 26,S$PBB,TXP, | Performed by: SURGERY

## 2019-07-16 PROCEDURE — 93990 DOPPLER FLOW TESTING: CPT | Mod: PBBFAC,TXP | Performed by: SURGERY

## 2019-07-18 ENCOUNTER — OFFICE VISIT (OUTPATIENT)
Dept: VASCULAR SURGERY | Facility: CLINIC | Age: 44
End: 2019-07-18
Attending: SURGERY
Payer: MEDICARE

## 2019-07-18 VITALS
HEIGHT: 68 IN | WEIGHT: 176.38 LBS | SYSTOLIC BLOOD PRESSURE: 122 MMHG | HEART RATE: 71 BPM | TEMPERATURE: 99 F | DIASTOLIC BLOOD PRESSURE: 79 MMHG | BODY MASS INDEX: 26.73 KG/M2

## 2019-07-18 DIAGNOSIS — Z99.2 ESRD ON HEMODIALYSIS: Primary | ICD-10-CM

## 2019-07-18 DIAGNOSIS — N18.6 ESRD ON HEMODIALYSIS: Primary | ICD-10-CM

## 2019-07-18 DIAGNOSIS — T82.858A AV FISTULA STENOSIS, INITIAL ENCOUNTER: ICD-10-CM

## 2019-07-18 PROCEDURE — 99999 PR PBB SHADOW E&M-EST. PATIENT-LVL III: CPT | Mod: PBBFAC,TXP,, | Performed by: SURGERY

## 2019-07-18 PROCEDURE — 99214 PR OFFICE/OUTPT VISIT, EST, LEVL IV, 30-39 MIN: ICD-10-PCS | Mod: S$PBB,NTX,, | Performed by: SURGERY

## 2019-07-18 PROCEDURE — 99213 OFFICE O/P EST LOW 20 MIN: CPT | Mod: PBBFAC,TXP | Performed by: SURGERY

## 2019-07-18 PROCEDURE — 99999 PR PBB SHADOW E&M-EST. PATIENT-LVL III: ICD-10-PCS | Mod: PBBFAC,TXP,, | Performed by: SURGERY

## 2019-07-18 PROCEDURE — 99214 OFFICE O/P EST MOD 30 MIN: CPT | Mod: S$PBB,NTX,, | Performed by: SURGERY

## 2019-07-18 RX ORDER — SODIUM CHLORIDE 0.9 % (FLUSH) 0.9 %
10 SYRINGE (ML) INJECTION
Status: DISCONTINUED | OUTPATIENT
Start: 2019-07-18 | End: 2019-07-25 | Stop reason: HOSPADM

## 2019-07-18 RX ORDER — LIDOCAINE HYDROCHLORIDE 10 MG/ML
1 INJECTION, SOLUTION EPIDURAL; INFILTRATION; INTRACAUDAL; PERINEURAL ONCE
Status: CANCELLED | OUTPATIENT
Start: 2019-07-18 | End: 2019-07-18

## 2019-07-18 RX ORDER — MUPIROCIN 20 MG/G
OINTMENT TOPICAL
Status: CANCELLED | OUTPATIENT
Start: 2019-07-18

## 2019-07-18 NOTE — PROGRESS NOTES
Haroldo Alok  07/18/2019    HPI:  Patient is a 43 y.o. male with a h/o HTN, ESRD with HD (MWF), DM who is here today for f/u of R RC AVF fistulagram in February 2019. He notes that he has had problems during dialysis with flow from the AVF into the machine, though he is able to complete dialysis when they gain access more distally. This started 1 week ago He denies swelling or pulsation of the AVF.     Underwent fistulagram Feb 2019 for presumed AVF thrombosis. Upper arm cephalic occluded and outflow via basilic. Renal failure due to HTN and is currently dialyzing M/W/F via the AVF. No problems with HD. Patient is right handed. Has been on dialysis since December 2013. He had a hypertensive stroke in December and has some residual left leg weakness. Walks with a walker.     Denies MI/ positive for stroke in 2013 (residual RLE weakness)  Tobacco use: denies    Past Medical History:   Diagnosis Date    Diabetes mellitus     Dialysis patient     DM type 2 causing renal disease, not at goal     ESRD (end stage renal disease) started dialysis 01/2014 6/5/2014    Hyperparathyroidism, secondary renal 6/5/2014    Hypertension     NSTEMI (non-ST elevated myocardial infarction) 12/21/2013    Organ transplant candidate 6/5/2014    Pneumonia     Renal hypertension     Stroke      Past Surgical History:   Procedure Laterality Date    COLONOSCOPY N/A 5/3/2017    Performed by [facility]uf[facility] MD at [facility] ENDO (4TH FLR)    [id number]-[id number]-RB Right 5/20/2014    Performed by Yaneth De La Cruz MD at [facility] OR 2ND FLR    DIALYSIS FISTULA CREATION      ESOPHAGOGASTRODUODENOSCOPY (EGD) N/A 5/3/2017    Performed by Rufino Carpenter MD at [facility] ENDO (4TH FLR)    Fistulogram N/A 2/18/2019    Performed by Yaneth De La Cruz MD at [facility] CATH LAB    FISTULOGRAM Right 6/4/2015    Performed by Ye Leonardo MD at [facility] CATH LAB    INSERTION-CATHETER-PERM-A-CATH Left 12/27/2013    [facility]     PERMCATH REMOVAL-TUNNELED CVC REMOVAL Right 8/28/2014    [facility] at [facility] CATH LAB    VASCULAR SURGERY       Family History   Problem Relation Age of Onset    Hypertension Mother     Diabetes Mother     Hypertension Father     Hypertension Sister     Kidney disease Brother     Hypertension Brother     Heart disease Brother     Cancer Neg Hx     Colon cancer Neg Hx     Esophageal cancer Neg Hx     Stomach cancer Neg Hx     Rectal cancer Neg Hx     Ulcerative colitis Neg Hx     Irritable bowel syndrome Neg Hx     Crohn's disease Neg Hx     Celiac disease Neg Hx      Social History     Socioeconomic History    Marital status: Single     Spouse name: Not on file    Number of children: Not on file    Years of education: Not on file    Highest education level: Not on file   Occupational History     Employer: [employer]   Social Needs    Financial resource strain: Not on file    Food insecurity:     Worry: Not on file     Inability: Not on file    Transportation needs:     Medical: Not on file     Non-medical: Not on file   Tobacco Use    Smoking status: Never Smoker    Smokeless tobacco: Never Used   Substance and Sexual Activity    Alcohol use: No    Drug use: No    Sexual activity: Yes     Partners: Female     Birth control/protection: None   Lifestyle    Physical activity:     Days per week: Not on file     Minutes per session: Not on file    Stress: Not on file   Relationships    Social connections:     Talks on phone: Not on file     Gets together: Not on file     Attends Yarsanism service: Not on file     Active member of club or organization: Not on file     Attends meetings of clubs or organizations: Not on file     Relationship status: Not on file   Other Topics Concern    Not on file   Social History Narrative    Disabled     for one year    One son age 11    History of blood transfusion       Current Outpatient Medications:     amlodipine  (NORVASC) 10 MG tablet, Take 10 mg by mouth once daily. , Disp: , Rfl:     aspirin 81 MG Chew, Take 1 tablet (81 mg total) by mouth once daily., Disp: 90 tablet, Rfl: 0    atorvastatin (LIPITOR) 40 MG tablet, Take 1 tablet (40 mg total) by mouth once daily., Disp: 90 tablet, Rfl: 3    dorzolamide (TRUSOPT) 2 % ophthalmic solution, 1 drop 3 (three) times daily. , Disp: , Rfl:     metoprolol succinate (TOPROL XL) 100 MG 24 hr tablet, Toprol  mg tablet,extended release  Take 1 tablet every day by oral route., Disp: , Rfl:     PRORENAL 8 mg iron-800 mcg-1,000 unit Tab, Take 1 tablet by mouth once daily., Disp: , Rfl: 11    RENVELA 800 mg Tab, 800 mg 3 (three) times daily with meals. , Disp: , Rfl:     SENSIPAR 30 mg Tab, Take 30 mg by mouth before dinner., Disp: , Rfl: 11    tadalafil (CIALIS) 20 MG Tab, Cialis 20 mg tablet  Take 1 pill by oral route every 3 days, Disp: , Rfl:     clopidogrel (PLAVIX) 75 mg tablet, Take 1 tablet (75 mg total) by mouth once. TAKE FOUR PILLS DAYBEFORE YOUR PROCEDURE, TAKE LAST PILL MORNING OF YOUR PROCEDURE for 1 dose, Disp: 5 tablet, Rfl: 0    Current Facility-Administered Medications:     sodium chloride 0.9% flush 10 mL, 10 mL, Intravenous, PRN, Lucille Ball MD    Facility-Administered Medications Ordered in Other Visits:     0.9%  NaCl infusion, , Intravenous, Continuous, Michelle Spence MD    ceFAZolin injection 2 g, 2 g, Intravenous, On Call Procedure, Michelle Spence MD    lidocaine (PF) 10 mg/ml (1%) injection 10 mg, 1 mL, Intradermal, Once, Michelle Spence MD    REVIEW OF SYSTEMS:  General: negative; ENT: negative; Allergy and Immunology: negative; Hematological and Lymphatic: Negative; Endocrine: negative; Respiratory: no cough, shortness of breath, or wheezing; Cardiovascular: no chest pain or dyspnea on exertion; Gastrointestinal: no abdominal pain/back, change in bowel habits, or bloody stools; Genito-Urinary: no dysuria,  trouble voiding, or hematuria; Musculoskeletal: negative  Neurological: no TIA or stroke symptoms; Psychiatric: no nervousness, anxiety or depression.    PHYSICAL EXAM:   Left Arm BP - Sittin/79 (19 0959)  Pulse: 71  Temp: 99.4 °F (37.4 °C)      General appearance:  Alert, well-appearing, and in no distress.  Oriented to person, place, and time   Neurological: Normal speech, no focal findings noted; CN II - XII grossly intact           Musculoskeletal: Digits/nail without cyanosis/clubbing.  Normal muscle strength/tone.                 Neck: Supple, no significant adenopathy; thyroid is not enlarged                  No carotid bruit can be auscultated                Chest:  Clear to auscultation, no wheezes, rales or rhonchi, symmetric air entry     No use of accessory muscles             Cardiac: Normal rate and regular rhythm, S1 and S2 normal; PMI non-displaced          Abdomen: Soft, nontender, nondistended, no masses or organomegaly     No rebound tenderness noted; bowel sounds normal     Pulsatile aortic mass is not palpable.     No groin adenopathy      Extremities:   2+ femoral pulses bilaterally     2+ pedal pulses palpable.     No pre-tibial edema     No ulcerations    LAB RESULTS:  Lab Results   Component Value Date    K 4.3 2019    K 4.1 2017    K 4.9 2017    CREATININE 11.8 (H) 2019    CREATININE 15.4 (H) 2017    CREATININE 6.0 (H) 2017     Lab Results   Component Value Date    WBC 7.14 2019    WBC 8.98 2017    WBC 7.25 2017    HCT 34.1 (L) 2019    HCT 23.7 (L) 2017    HCT 25.7 (L) 2017     (H) 2019     (H) 2017     (H) 2017     Lab Results   Component Value Date    HGBA1C 4.4 (L) 2014    HGBA1C 5.9 2013     IMAGING:  EXAM DUSTY: 2019 10:25  REF PHYS: OLIMPIA, W      Indication:  PAD.  Results:  Dialysis Access: Right Arm Radiocephalic fistula                     PSV [cm/s]        Flow Volume [ml/min]  ________________________________________________________________  Proximal to Anastomosis (artery)    389               -  Anastomosis       600               -  Distal to Anastomosis (vein)        325               -  Proximal AVF      44                -  Middle AVF        56                788  Distal AVF        89                -    PSVs: in cm./sec.   Mid Bicep- 63  Upper Arm - 50 Cephalic arch- 54   Cephalic confluence- 86.    Report Summary:  Impression:   Color flow duplex exam reveals a patent right radiocephalic AV fistula. There is a visual narrowing just beyond the anastomosis at the distal wrist level; however, it does not appear to be flow limiting.  Flow in the cephalic vein is somewhat continuous   above the ACF. The thrombosed cephalic vein in the upper arm has apparently recanalized and is now seen to be patent. The vein appears diffusely narrowed at the level of the cephalic arch. There is no evidence of a focal hemodynamically significant   stenosis. Some thrombus is visualized at mid forearm level in dilated segments of the vein however it is not significantly narrowed to result in a focal increase in PSVs. Volume flow at mid forearm level measures 788 ml./min.    IMP/PLAN:  43 y.o. male with ESRD, HTN, DM with R RC AVF originally placed in 2013. He then had fistulagram in February 2019 that showed patent AVF, but R cephalic v. Occlusion with outflow via the basilic system. He returns today for evaluation of difficulties with dialysis for the past week, with clot formation at the completion of HD runs. Strong thrill proximally, but absent distally. Will plan for R transradial fistulagram next week.    1) R transradial fistulagram next week    Oz Cordoba MD FACS  Vascular/Endovascular Surgery

## 2019-07-18 NOTE — H&P (VIEW-ONLY)
Haroldo Alok  07/18/2019    HPI:  Patient is a 43 y.o. male with a h/o HTN, ESRD with HD (MWF), DM who is here today for f/u of R RC AVF fistulagram in February 2019. He notes that he has had problems during dialysis with flow from the AVF into the machine, though he is able to complete dialysis when they gain access more distally. This started 1 week ago He denies swelling or pulsation of the AVF.     Underwent fistulagram Feb 2019 for presumed AVF thrombosis. Upper arm cephalic occluded and outflow via basilic. Renal failure due to HTN and is currently dialyzing M/W/F via the AVF. No problems with HD. Patient is right handed. Has been on dialysis since December 2013. He had a hypertensive stroke in December and has some residual left leg weakness. Walks with a walker.     Denies MI/ positive for stroke in 2013 (residual RLE weakness)  Tobacco use: denies    Past Medical History:   Diagnosis Date    Diabetes mellitus     Dialysis patient     DM type 2 causing renal disease, not at goal     ESRD (end stage renal disease) started dialysis 01/2014 6/5/2014    Hyperparathyroidism, secondary renal 6/5/2014    Hypertension     NSTEMI (non-ST elevated myocardial infarction) 12/21/2013    Organ transplant candidate 6/5/2014    Pneumonia     Renal hypertension     Stroke      Past Surgical History:   Procedure Laterality Date    COLONOSCOPY N/A 5/3/2017    Performed by [facility]ufino Carpenter MD at [facility] ENDO (4TH FLR)    [id number] Right 5/20/2014    Performed by Yaneth De La Cruz MD at [facility] OR 2ND FLR    DIALYSIS FISTULA CREATION      ESOPHAGOGASTRODUODENOSCOPY (EGD) N/A 5/3/2017    Performed by Rufino Carpenter MD at [facility] ENDO (4TH FLR)    Fistulogram N/A 2/18/2019    Performed by Yaneth De La Cruz MD at [facility] CATH LAB    FISTULOGRAM Right 6/4/2015    Performed by Ye Leonardo MD at [facility] CATH LAB    INSERTION-CATHETER-PERM-A-CATH Left 12/27/2013    [facility]     PERMCATH REMOVAL-TUNNELED CVC REMOVAL Right 8/28/2014    [facility]    VASCULAR SURGERY       Family History   Problem Relation Age of Onset    Hypertension Mother     Diabetes Mother     Hypertension Father     Hypertension Sister     Kidney disease Brother     Hypertension Brother     Heart disease Brother     Cancer Neg Hx     Colon cancer Neg Hx     Esophageal cancer Neg Hx     Stomach cancer Neg Hx     Rectal cancer Neg Hx     Ulcerative colitis Neg Hx     Irritable bowel syndrome Neg Hx     Crohn's disease Neg Hx     Celiac disease Neg Hx      Social History     Socioeconomic History    Marital status: Single     Spouse name: Not on file    Number of children: Not on file    Years of education: Not on file    Highest education level: Not on file   Occupational History     Employer: [employer]   Social Needs    Financial resource strain: Not on file    Food insecurity:     Worry: Not on file     Inability: Not on file    Transportation needs:     Medical: Not on file     Non-medical: Not on file   Tobacco Use    Smoking status: Never Smoker    Smokeless tobacco: Never Used   Substance and Sexual Activity    Alcohol use: No    Drug use: No    Sexual activity: Yes     Partners: Female     Birth control/protection: None   Lifestyle    Physical activity:     Days per week: Not on file     Minutes per session: Not on file    Stress: Not on file   Relationships    Social connections:     Talks on phone: Not on file     Gets together: Not on file     Attends Yazidi service: Not on file     Active member of club or organization: Not on file     Attends meetings of clubs or organizations: Not on file     Relationship status: Not on file   Other Topics Concern    Not on file   Social History Narrative    Disabled     for one year    One son age 11    History of blood transfusion       Current Outpatient Medications:     amlodipine  (NORVASC) 10 MG tablet, Take 10 mg by mouth once daily. , Disp: , Rfl:     aspirin 81 MG Chew, Take 1 tablet (81 mg total) by mouth once daily., Disp: 90 tablet, Rfl: 0    atorvastatin (LIPITOR) 40 MG tablet, Take 1 tablet (40 mg total) by mouth once daily., Disp: 90 tablet, Rfl: 3    dorzolamide (TRUSOPT) 2 % ophthalmic solution, 1 drop 3 (three) times daily. , Disp: , Rfl:     metoprolol succinate (TOPROL XL) 100 MG 24 hr tablet, Toprol  mg tablet,extended release  Take 1 tablet every day by oral route., Disp: , Rfl:     PRORENAL 8 mg iron-800 mcg-1,000 unit Tab, Take 1 tablet by mouth once daily., Disp: , Rfl: 11    RENVELA 800 mg Tab, 800 mg 3 (three) times daily with meals. , Disp: , Rfl:     SENSIPAR 30 mg Tab, Take 30 mg by mouth before dinner., Disp: , Rfl: 11    tadalafil (CIALIS) 20 MG Tab, Cialis 20 mg tablet  Take 1 pill by oral route every 3 days, Disp: , Rfl:     clopidogrel (PLAVIX) 75 mg tablet, Take 1 tablet (75 mg total) by mouth once. TAKE FOUR PILLS DAYBEFORE YOUR PROCEDURE, TAKE LAST PILL MORNING OF YOUR PROCEDURE for 1 dose, Disp: 5 tablet, Rfl: 0    Current Facility-Administered Medications:     sodium chloride 0.9% flush 10 mL, 10 mL, Intravenous, PRN, Lucille Ball MD    Facility-Administered Medications Ordered in Other Visits:     0.9%  NaCl infusion, , Intravenous, Continuous, Michelle Spence MD    ceFAZolin injection 2 g, 2 g, Intravenous, On Call Procedure, Michelle Spence MD    lidocaine (PF) 10 mg/ml (1%) injection 10 mg, 1 mL, Intradermal, Once, Michelle Spence MD    REVIEW OF SYSTEMS:  General: negative; ENT: negative; Allergy and Immunology: negative; Hematological and Lymphatic: Negative; Endocrine: negative; Respiratory: no cough, shortness of breath, or wheezing; Cardiovascular: no chest pain or dyspnea on exertion; Gastrointestinal: no abdominal pain/back, change in bowel habits, or bloody stools; Genito-Urinary: no dysuria,  trouble voiding, or hematuria; Musculoskeletal: negative  Neurological: no TIA or stroke symptoms; Psychiatric: no nervousness, anxiety or depression.    PHYSICAL EXAM:   Left Arm BP - Sittin/79 (19 0959)  Pulse: 71  Temp: 99.4 °F (37.4 °C)      General appearance:  Alert, well-appearing, and in no distress.  Oriented to person, place, and time   Neurological: Normal speech, no focal findings noted; CN II - XII grossly intact           Musculoskeletal: Digits/nail without cyanosis/clubbing.  Normal muscle strength/tone.                 Neck: Supple, no significant adenopathy; thyroid is not enlarged                  No carotid bruit can be auscultated                Chest:  Clear to auscultation, no wheezes, rales or rhonchi, symmetric air entry     No use of accessory muscles             Cardiac: Normal rate and regular rhythm, S1 and S2 normal; PMI non-displaced          Abdomen: Soft, nontender, nondistended, no masses or organomegaly     No rebound tenderness noted; bowel sounds normal     Pulsatile aortic mass is not palpable.     No groin adenopathy      Extremities:   2+ femoral pulses bilaterally     2+ pedal pulses palpable.     No pre-tibial edema     No ulcerations    LAB RESULTS:  Lab Results   Component Value Date    K 4.3 2019    K 4.1 2017    K 4.9 2017    CREATININE 11.8 (H) 2019    CREATININE 15.4 (H) 2017    CREATININE 6.0 (H) 2017     Lab Results   Component Value Date    WBC 7.14 2019    WBC 8.98 2017    WBC 7.25 2017    HCT 34.1 (L) 2019    HCT 23.7 (L) 2017    HCT 25.7 (L) 2017     (H) 2019     (H) 2017     (H) 2017     Lab Results   Component Value Date    HGBA1C 4.4 (L) 2014    HGBA1C 5.9 2013     IMAGING:  EXAM DUSTY: 2019 10:25  REF PHYS: OLIMPIA, W      Indication:  PAD.  Results:  Dialysis Access: Right Arm Radiocephalic fistula                     PSV [cm/s]        Flow Volume [ml/min]  ________________________________________________________________  Proximal to Anastomosis (artery)    389               -  Anastomosis       600               -  Distal to Anastomosis (vein)        325               -  Proximal AVF      44                -  Middle AVF        56                788  Distal AVF        89                -    PSVs: in cm./sec.   Mid Bicep- 63  Upper Arm - 50 Cephalic arch- 54   Cephalic confluence- 86.    Report Summary:  Impression:   Color flow duplex exam reveals a patent right radiocephalic AV fistula. There is a visual narrowing just beyond the anastomosis at the distal wrist level; however, it does not appear to be flow limiting.  Flow in the cephalic vein is somewhat continuous   above the ACF. The thrombosed cephalic vein in the upper arm has apparently recanalized and is now seen to be patent. The vein appears diffusely narrowed at the level of the cephalic arch. There is no evidence of a focal hemodynamically significant   stenosis. Some thrombus is visualized at mid forearm level in dilated segments of the vein however it is not significantly narrowed to result in a focal increase in PSVs. Volume flow at mid forearm level measures 788 ml./min.    IMP/PLAN:  43 y.o. male with ESRD, HTN, DM with R RC AVF originally placed in 2013. He then had fistulagram in February 2019 that showed patent AVF, but R cephalic v. Occlusion with outflow via the basilic system. He returns today for evaluation of difficulties with dialysis for the past week, with clot formation at the completion of HD runs. Strong thrill proximally, but absent distally. Will plan for R transradial fistulagram next week.    1) R transradial fistulagram next week    Oz Cordoba MD FACS  Vascular/Endovascular Surgery

## 2019-07-23 ENCOUNTER — HOSPITAL ENCOUNTER (OUTPATIENT)
Facility: HOSPITAL | Age: 44
Discharge: HOME | End: 2019-07-23
Attending: SURGERY | Admitting: SURGERY
Payer: MEDICARE

## 2019-07-23 VITALS
SYSTOLIC BLOOD PRESSURE: 140 MMHG | HEIGHT: 68 IN | TEMPERATURE: 97 F | DIASTOLIC BLOOD PRESSURE: 77 MMHG | WEIGHT: 165 LBS | OXYGEN SATURATION: 95 % | HEART RATE: 61 BPM | BODY MASS INDEX: 25.01 KG/M2 | RESPIRATION RATE: 18 BRPM

## 2019-07-23 DIAGNOSIS — Z99.2 END STAGE RENAL FAILURE ON DIALYSIS: Primary | Chronic | ICD-10-CM

## 2019-07-23 DIAGNOSIS — Z99.2 ESRD ON HEMODIALYSIS: Primary | ICD-10-CM

## 2019-07-23 DIAGNOSIS — N18.6 ESRD ON HEMODIALYSIS: Primary | ICD-10-CM

## 2019-07-23 DIAGNOSIS — N18.6 END STAGE RENAL FAILURE ON DIALYSIS: Primary | Chronic | ICD-10-CM

## 2019-07-23 PROCEDURE — 99152 MOD SED SAME PHYS/QHP 5/>YRS: CPT | Mod: NTX,,, | Performed by: SURGERY

## 2019-07-23 PROCEDURE — 36901 INTRO CATH DIALYSIS CIRCUIT: CPT | Mod: GC,NTX,, | Performed by: SURGERY

## 2019-07-23 PROCEDURE — 36901 INTRO CATH DIALYSIS CIRCUIT: CPT | Mod: TXP | Performed by: SURGERY

## 2019-07-23 PROCEDURE — 25000003 PHARM REV CODE 250: Mod: TXP | Performed by: SURGERY

## 2019-07-23 PROCEDURE — 99152 PR MOD CONSCIOUS SEDATION, SAME PHYS, 5+ YRS, FIRST 15 MIN: ICD-10-PCS | Mod: NTX,,, | Performed by: SURGERY

## 2019-07-23 PROCEDURE — 99152 MOD SED SAME PHYS/QHP 5/>YRS: CPT | Mod: NTX | Performed by: SURGERY

## 2019-07-23 PROCEDURE — 36901 PR INTRO CATH, DIALYSIS CIRCUIT: ICD-10-PCS | Mod: GC,NTX,, | Performed by: SURGERY

## 2019-07-23 PROCEDURE — 63600175 PHARM REV CODE 636 W HCPCS: Mod: TXP | Performed by: SURGERY

## 2019-07-23 PROCEDURE — C1894 INTRO/SHEATH, NON-LASER: HCPCS | Mod: TXP | Performed by: SURGERY

## 2019-07-23 PROCEDURE — 99153 MOD SED SAME PHYS/QHP EA: CPT | Mod: TXP | Performed by: SURGERY

## 2019-07-23 PROCEDURE — C1769 GUIDE WIRE: HCPCS | Mod: NTX | Performed by: SURGERY

## 2019-07-23 RX ORDER — MIDAZOLAM HYDROCHLORIDE 1 MG/ML
INJECTION, SOLUTION INTRAMUSCULAR; INTRAVENOUS
Status: DISCONTINUED | OUTPATIENT
Start: 2019-07-23 | End: 2019-07-23 | Stop reason: HOSPADM

## 2019-07-23 RX ORDER — VERAPAMIL HYDROCHLORIDE 2.5 MG/ML
INJECTION, SOLUTION INTRAVENOUS
Status: DISCONTINUED | OUTPATIENT
Start: 2019-07-23 | End: 2019-07-23 | Stop reason: HOSPADM

## 2019-07-23 RX ORDER — LIDOCAINE HYDROCHLORIDE 10 MG/ML
1 INJECTION, SOLUTION EPIDURAL; INFILTRATION; INTRACAUDAL; PERINEURAL ONCE
Status: DISCONTINUED | OUTPATIENT
Start: 2019-07-23 | End: 2019-07-23 | Stop reason: HOSPADM

## 2019-07-23 RX ORDER — NITROGLYCERIN 5 MG/ML
INJECTION, SOLUTION INTRAVENOUS
Status: DISCONTINUED | OUTPATIENT
Start: 2019-07-23 | End: 2019-07-23 | Stop reason: HOSPADM

## 2019-07-23 RX ORDER — FENTANYL CITRATE 50 UG/ML
INJECTION, SOLUTION INTRAMUSCULAR; INTRAVENOUS
Status: DISCONTINUED | OUTPATIENT
Start: 2019-07-23 | End: 2019-07-23 | Stop reason: HOSPADM

## 2019-07-23 RX ORDER — HEPARIN SODIUM 200 [USP'U]/100ML
INJECTION, SOLUTION INTRAVENOUS
Status: DISCONTINUED | OUTPATIENT
Start: 2019-07-23 | End: 2019-07-23 | Stop reason: HOSPADM

## 2019-07-23 RX ORDER — MUPIROCIN 20 MG/G
OINTMENT TOPICAL
Status: DISCONTINUED | OUTPATIENT
Start: 2019-07-23 | End: 2019-07-23 | Stop reason: HOSPADM

## 2019-07-23 RX ORDER — HEPARIN SODIUM 1000 [USP'U]/ML
INJECTION, SOLUTION INTRAVENOUS; SUBCUTANEOUS
Status: DISCONTINUED | OUTPATIENT
Start: 2019-07-23 | End: 2019-07-23 | Stop reason: HOSPADM

## 2019-07-23 RX ORDER — LIDOCAINE HYDROCHLORIDE 20 MG/ML
INJECTION, SOLUTION INFILTRATION; PERINEURAL
Status: DISCONTINUED | OUTPATIENT
Start: 2019-07-23 | End: 2019-07-23 | Stop reason: HOSPADM

## 2019-07-23 NOTE — INTERVAL H&P NOTE
The patient has been examined and the H&P has been reviewed:    I concur with the findings and no changes have occurred since H&P was written.    Anesthesia/Surgery risks, benefits and alternative options discussed and understood by patient/family.          Active Hospital Problems    Diagnosis  POA    ESRD on hemodialysis [N18.6, Z99.2]  Not Applicable      Resolved Hospital Problems   No resolved problems to display.

## 2019-07-23 NOTE — NURSING
Ambulated on unit with the assistance of a cane with wife at pt side.  Denies pain or SOB.  Verbalized understanding of procedure.  Fistula noted to right forearm.  Will continue to monitor.

## 2019-07-23 NOTE — PLAN OF CARE
Problem: Adult Inpatient Plan of Care  Goal: Plan of Care Review  Outcome: Ongoing (interventions implemented as appropriate)  Received report from PORTIA Polo. Patient s/p RUE fistulagram, AAOx4. VSS, no c/o pain or discomfort at this time, resp even and unlabored. Right lower forearm with gauze/tegaderm dressing  CDI. +thrill/+bruit.  No active bleeding. No hematoma noted. Post procedure protocol reviewed with patient and patient's family. Understanding verbalized. Family members at bedside. Nurse call bell within reach. Will continue to monitor per post procedure protocol.

## 2019-07-23 NOTE — BRIEF OP NOTE
Brief Operative Note  Date: 07/23/2019    Surgeon(s) and Role:     * Oz Cordoba MD - Primary    Pre-op Diagnosis:  ESRD on hemodialysis [N18.6, Z99.2]; AvF stenosis    Post-op Diagnosis:  Same    Procedure(s):  1) RUE fistulagram  2) Conscious sedation 60 min    Surgeon: Oz Cordoba MD  Vascular & Endovascular Surgery       Anesthesia: local/mod sedation    Findings/Key Components:  No visible hemodynamically significant stenoses    EBL: Minimal         Specimens (From admission, onward)    None          I attest to being present for the procedure and performing the case.  Oz Cordoba MD  Vascular & Endovascular Surgery     Discharge Note  SUMMARY    Admit Date: 7/23/2019    Attending Physician: Oz Cordoba MD  Vascular & Endovascular Surgery       Discharge Physician: Oz Cordoba MD  Vascular & Endovascular Surgery       Discharge Date: 07/23/2019    Final Diagnosis: ESRD on hemodialysis [N18.6, Z99.2]    Disposition: Home or self-care    Patient Instructions:   Current Discharge Medication List      CONTINUE these medications which have NOT CHANGED    Details   amlodipine (NORVASC) 10 MG tablet Take 10 mg by mouth once daily.       aspirin 81 MG Chew Take 1 tablet (81 mg total) by mouth once daily.  Qty: 90 tablet, Refills: 0      atorvastatin (LIPITOR) 40 MG tablet Take 1 tablet (40 mg total) by mouth once daily.  Qty: 90 tablet, Refills: 3      metoprolol succinate (TOPROL XL) 100 MG 24 hr tablet Toprol  mg tablet,extended release   Take 1 tablet every day by oral route.      RENVELA 800 mg Tab 800 mg 3 (three) times daily with meals.       SENSIPAR 30 mg Tab Take 30 mg by mouth before dinner.  Refills: 11      clopidogrel (PLAVIX) 75 mg tablet Take 1 tablet (75 mg total) by mouth once. TAKE FOUR PILLS DAYBEFORE YOUR PROCEDURE, TAKE LAST PILL MORNING OF YOUR PROCEDURE for 1 dose  Qty: 5 tablet, Refills: 0      dorzolamide (TRUSOPT) 2 % ophthalmic solution 1 drop 3 (three)  times daily.       PRORENAL 8 mg iron-800 mcg-1,000 unit Tab Take 1 tablet by mouth once daily.  Refills: 11      tadalafil (CIALIS) 20 MG Tab Cialis 20 mg tablet   Take 1 pill by oral route every 3 days             Diet:  Resume pre-operative diet    Activity:  Ad tamra    Follow-up:  Follow-up in clinic with Dr Cordoba within 1-2 weeks; please call clinic nurse at

## 2019-07-23 NOTE — Clinical Note
Angiography performed of the ostial Right AV Fistula. Angiography performed via hand injection with .

## 2019-07-23 NOTE — NURSING
IV d/c'd with cath tip intact.  Pt and pt wife verbalized understanding of d/c instructions.  Dressing to right wrist remains clean dry and intact. Off unit via wheelchair for discharge home.

## 2019-07-25 ENCOUNTER — HOSPITAL ENCOUNTER (OUTPATIENT)
Facility: HOSPITAL | Age: 44
Discharge: HOME | End: 2019-07-25
Attending: INTERNAL MEDICINE | Admitting: INTERNAL MEDICINE
Payer: MEDICARE

## 2019-07-25 VITALS
HEIGHT: 69 IN | SYSTOLIC BLOOD PRESSURE: 143 MMHG | BODY MASS INDEX: 24.44 KG/M2 | DIASTOLIC BLOOD PRESSURE: 84 MMHG | HEART RATE: 65 BPM | OXYGEN SATURATION: 98 % | TEMPERATURE: 98 F | RESPIRATION RATE: 18 BRPM | WEIGHT: 165 LBS

## 2019-07-25 DIAGNOSIS — R94.39 POSITIVE CARDIAC STRESS TEST: ICD-10-CM

## 2019-07-25 DIAGNOSIS — I25.10 CAD (CORONARY ARTERY DISEASE): ICD-10-CM

## 2019-07-25 LAB
ABO + RH BLD: NORMAL
ANION GAP SERPL CALC-SCNC: 14 MMOL/L (ref 8–16)
BASOPHILS # BLD AUTO: 0.12 K/UL (ref 0–0.2)
BASOPHILS NFR BLD: 1.6 % (ref 0–1.9)
BLD GP AB SCN CELLS X3 SERPL QL: NORMAL
BUN SERPL-MCNC: 42 MG/DL (ref 6–20)
CALCIUM SERPL-MCNC: 8.8 MG/DL (ref 8.7–10.5)
CHLORIDE SERPL-SCNC: 99 MMOL/L (ref 95–110)
CO2 SERPL-SCNC: 26 MMOL/L (ref 23–29)
CREAT SERPL-MCNC: 10.7 MG/DL (ref 0.5–1.4)
DIFFERENTIAL METHOD: ABNORMAL
EOSINOPHIL # BLD AUTO: 0.4 K/UL (ref 0–0.5)
EOSINOPHIL NFR BLD: 6 % (ref 0–8)
ERYTHROCYTE [DISTWIDTH] IN BLOOD BY AUTOMATED COUNT: 16.9 % (ref 11.5–14.5)
EST. GFR  (AFRICAN AMERICAN): 6.1 ML/MIN/1.73 M^2
EST. GFR  (NON AFRICAN AMERICAN): 5.3 ML/MIN/1.73 M^2
GLUCOSE SERPL-MCNC: 89 MG/DL (ref 70–110)
HCT VFR BLD AUTO: 33.8 % (ref 40–54)
HGB BLD-MCNC: 10.4 G/DL (ref 14–18)
IMM GRANULOCYTES # BLD AUTO: 0.05 K/UL (ref 0–0.04)
IMM GRANULOCYTES NFR BLD AUTO: 0.7 % (ref 0–0.5)
INR PPP: 1.1 (ref 0.8–1.2)
LYMPHOCYTES # BLD AUTO: 1.7 K/UL (ref 1–4.8)
LYMPHOCYTES NFR BLD: 23 % (ref 18–48)
MCH RBC QN AUTO: 31.6 PG (ref 27–31)
MCHC RBC AUTO-ENTMCNC: 30.8 G/DL (ref 32–36)
MCV RBC AUTO: 103 FL (ref 82–98)
MONOCYTES # BLD AUTO: 0.8 K/UL (ref 0.3–1)
MONOCYTES NFR BLD: 10.7 % (ref 4–15)
NEUTROPHILS # BLD AUTO: 4.3 K/UL (ref 1.8–7.7)
NEUTROPHILS NFR BLD: 58 % (ref 38–73)
NRBC BLD-RTO: 0 /100 WBC
PLATELET # BLD AUTO: 506 K/UL (ref 150–350)
PMV BLD AUTO: 8.6 FL (ref 9.2–12.9)
POTASSIUM SERPL-SCNC: 4.2 MMOL/L (ref 3.5–5.1)
PROTHROMBIN TIME: 11.2 SEC (ref 9–12.5)
RBC # BLD AUTO: 3.29 M/UL (ref 4.6–6.2)
SODIUM SERPL-SCNC: 139 MMOL/L (ref 136–145)
WBC # BLD AUTO: 7.36 K/UL (ref 3.9–12.7)

## 2019-07-25 PROCEDURE — C1894 INTRO/SHEATH, NON-LASER: HCPCS | Mod: TXP | Performed by: INTERNAL MEDICINE

## 2019-07-25 PROCEDURE — 92928 PR STENT: ICD-10-PCS | Mod: RC,GC,TXP, | Performed by: INTERNAL MEDICINE

## 2019-07-25 PROCEDURE — 99152 MOD SED SAME PHYS/QHP 5/>YRS: CPT | Mod: TXP | Performed by: INTERNAL MEDICINE

## 2019-07-25 PROCEDURE — 99153 MOD SED SAME PHYS/QHP EA: CPT | Mod: TXP | Performed by: INTERNAL MEDICINE

## 2019-07-25 PROCEDURE — C1874 STENT, COATED/COV W/DEL SYS: HCPCS | Mod: TXP | Performed by: INTERNAL MEDICINE

## 2019-07-25 PROCEDURE — 93010 ELECTROCARDIOGRAM REPORT: CPT | Mod: TXP,,, | Performed by: INTERNAL MEDICINE

## 2019-07-25 PROCEDURE — C1887 CATHETER, GUIDING: HCPCS | Mod: TXP | Performed by: INTERNAL MEDICINE

## 2019-07-25 PROCEDURE — 85025 COMPLETE CBC W/AUTO DIFF WBC: CPT | Mod: TXP

## 2019-07-25 PROCEDURE — C1760 CLOSURE DEV, VASC: HCPCS | Mod: TXP | Performed by: INTERNAL MEDICINE

## 2019-07-25 PROCEDURE — 63600175 PHARM REV CODE 636 W HCPCS: Mod: TXP | Performed by: INTERNAL MEDICINE

## 2019-07-25 PROCEDURE — 25000003 PHARM REV CODE 250: Mod: TXP | Performed by: STUDENT IN AN ORGANIZED HEALTH CARE EDUCATION/TRAINING PROGRAM

## 2019-07-25 PROCEDURE — C1725 CATH, TRANSLUMIN NON-LASER: HCPCS | Mod: TXP | Performed by: INTERNAL MEDICINE

## 2019-07-25 PROCEDURE — 93005 ELECTROCARDIOGRAM TRACING: CPT | Mod: TXP

## 2019-07-25 PROCEDURE — 86850 RBC ANTIBODY SCREEN: CPT | Mod: TXP

## 2019-07-25 PROCEDURE — 92928 PRQ TCAT PLMT NTRAC ST 1 LES: CPT | Mod: RC,GC,TXP, | Performed by: INTERNAL MEDICINE

## 2019-07-25 PROCEDURE — C1769 GUIDE WIRE: HCPCS | Mod: TXP | Performed by: INTERNAL MEDICINE

## 2019-07-25 PROCEDURE — 93454 CORONARY ARTERY ANGIO S&I: CPT | Mod: 59,TXP | Performed by: INTERNAL MEDICINE

## 2019-07-25 PROCEDURE — 80048 BASIC METABOLIC PNL TOTAL CA: CPT | Mod: TXP

## 2019-07-25 PROCEDURE — 25000003 PHARM REV CODE 250: Mod: TXP | Performed by: INTERNAL MEDICINE

## 2019-07-25 PROCEDURE — 93454 CORONARY ARTERY ANGIO S&I: CPT | Mod: 26,59,GC,TXP | Performed by: INTERNAL MEDICINE

## 2019-07-25 PROCEDURE — 93010 EKG 12-LEAD: ICD-10-PCS | Mod: TXP,,, | Performed by: INTERNAL MEDICINE

## 2019-07-25 PROCEDURE — C9600 PERC DRUG-EL COR STENT SING: HCPCS | Mod: RC,TXP | Performed by: INTERNAL MEDICINE

## 2019-07-25 PROCEDURE — 85610 PROTHROMBIN TIME: CPT | Mod: TXP

## 2019-07-25 PROCEDURE — 99152 MOD SED SAME PHYS/QHP 5/>YRS: CPT | Mod: GC,TXP,, | Performed by: INTERNAL MEDICINE

## 2019-07-25 PROCEDURE — 25500020 PHARM REV CODE 255: Mod: TXP | Performed by: INTERNAL MEDICINE

## 2019-07-25 PROCEDURE — 99152 PR MOD CONSCIOUS SEDATION, SAME PHYS, 5+ YRS, FIRST 15 MIN: ICD-10-PCS | Mod: GC,TXP,, | Performed by: INTERNAL MEDICINE

## 2019-07-25 PROCEDURE — 27201423 OPTIME MED/SURG SUP & DEVICES STERILE SUPPLY: Mod: TXP | Performed by: INTERNAL MEDICINE

## 2019-07-25 PROCEDURE — 93454 PR CATH PLACE/CORONARY ANGIO, IMG SUPER/INTERP: ICD-10-PCS | Mod: 26,59,GC,TXP | Performed by: INTERNAL MEDICINE

## 2019-07-25 DEVICE — STENT RONYX25026UX RESOLUTE ONYX 2.50X26
Type: IMPLANTABLE DEVICE | Site: HEART | Status: FUNCTIONAL
Brand: RESOLUTE ONYX™

## 2019-07-25 DEVICE — STENT RONYX25012UX RESOLUTE ONYX 2.50X12
Type: IMPLANTABLE DEVICE | Site: HEART | Status: FUNCTIONAL
Brand: RESOLUTE ONYX™

## 2019-07-25 RX ORDER — NITROGLYCERIN 5 MG/ML
INJECTION, SOLUTION INTRAVENOUS
Status: DISCONTINUED | OUTPATIENT
Start: 2019-07-25 | End: 2019-07-25 | Stop reason: HOSPADM

## 2019-07-25 RX ORDER — CLOPIDOGREL BISULFATE 75 MG/1
75 TABLET ORAL DAILY
Qty: 30 TABLET | Refills: 5 | Status: SHIPPED | OUTPATIENT
Start: 2019-07-25 | End: 2021-05-25

## 2019-07-25 RX ORDER — OXYCODONE AND ACETAMINOPHEN 5; 325 MG/1; MG/1
1 TABLET ORAL EVERY 6 HOURS PRN
Status: DISCONTINUED | OUTPATIENT
Start: 2019-07-25 | End: 2019-07-25 | Stop reason: HOSPADM

## 2019-07-25 RX ORDER — FENTANYL CITRATE 50 UG/ML
INJECTION, SOLUTION INTRAMUSCULAR; INTRAVENOUS
Status: DISCONTINUED | OUTPATIENT
Start: 2019-07-25 | End: 2019-07-25 | Stop reason: HOSPADM

## 2019-07-25 RX ORDER — SODIUM CHLORIDE 9 MG/ML
INJECTION, SOLUTION INTRAVENOUS ONCE
Status: DISCONTINUED | OUTPATIENT
Start: 2019-07-25 | End: 2019-07-25

## 2019-07-25 RX ORDER — HEPARIN SODIUM 200 [USP'U]/100ML
INJECTION, SOLUTION INTRAVENOUS
Status: DISCONTINUED | OUTPATIENT
Start: 2019-07-25 | End: 2019-07-25 | Stop reason: HOSPADM

## 2019-07-25 RX ORDER — HEPARIN SODIUM 1000 [USP'U]/ML
INJECTION, SOLUTION INTRAVENOUS; SUBCUTANEOUS
Status: DISCONTINUED | OUTPATIENT
Start: 2019-07-25 | End: 2019-07-25 | Stop reason: HOSPADM

## 2019-07-25 RX ORDER — MIDAZOLAM HYDROCHLORIDE 1 MG/ML
INJECTION, SOLUTION INTRAMUSCULAR; INTRAVENOUS
Status: DISCONTINUED | OUTPATIENT
Start: 2019-07-25 | End: 2019-07-25 | Stop reason: HOSPADM

## 2019-07-25 RX ORDER — LIDOCAINE HYDROCHLORIDE 20 MG/ML
INJECTION, SOLUTION INFILTRATION; PERINEURAL
Status: DISCONTINUED | OUTPATIENT
Start: 2019-07-25 | End: 2019-07-25 | Stop reason: HOSPADM

## 2019-07-25 RX ORDER — DIPHENHYDRAMINE HCL 50 MG
50 CAPSULE ORAL ONCE
Status: COMPLETED | OUTPATIENT
Start: 2019-07-25 | End: 2019-07-25

## 2019-07-25 RX ADMIN — DIPHENHYDRAMINE HYDROCHLORIDE 50 MG: 50 CAPSULE ORAL at 10:07

## 2019-07-25 RX ADMIN — OXYCODONE HYDROCHLORIDE AND ACETAMINOPHEN 1 TABLET: 5; 325 TABLET ORAL at 04:07

## 2019-07-25 NOTE — NURSING
Received via bed from cath lab.  Report from PORTIA langford.  Awake alert and oriented.  Hematoma noted to right groin.  Dr. Sandres at bedside with cath lab nurse and pressure applied by Dr. Sanders.  VSS.  Denies pain or SOB.  Pt instructed on bedrest.  Call bell provided.  Will continue to monitor.

## 2019-07-25 NOTE — DISCHARGE SUMMARY
[facility]  Interventional Cardiology  Discharge Summary      Patient Name: Haroldo Dickinson  MRN: [id number]  Admission Date: 7/25/2019  Hospital Length of Stay: 0 days  Discharge Date and Time:  07/25/2019 2:51 PM  Attending Physician: Miki Zendejas MD  Discharging Provider: Lucille Ball MD  Primary Care Physician: Tess Ledbetter MD    HPI: 44 yo M PMH of HTN, CVA, ESRD on HD MWF p/w positive SPECT stress done for preop clearance for kidney transplant. He denies any CP, SOB or OSEGUERA. He is euvolemic. Uses a cane due to residual weakness from CVA.     Procedure(s) (LRB):  Left heart cath (Left)  INSERTION, STENT, CORONARY ARTERY (N/A)     Indwelling Lines/Drains at time of discharge:  Lines/Drains/Airways     Drain                 Hemodialysis AV Fistula Right forearm -- days                Hospital Course (synopsis of major diagnoses, care, treatment, and services provided during the course of the hospital stay): We perfromed the procedure via R CFA and found significant stenosis (80-90%) of mRCA we perfromed angioplasty and then HILLARY stenting PCI x 2 at [facility]. Aspirin for life, Plavix for at least 6 months (prescribed).  Patient had manual pressure held at groin site post procedure small hematoma resolved with manual pressure. Discharged with no issues.        Significant Diagnostic Studies: Labs:   BMP:   Recent Labs   Lab 07/25/19  1003   GLU 89      K 4.2   CL 99   CO2 26   BUN 42*   CREATININE 10.7*   CALCIUM 8.8       Pending Diagnostic Studies:     Procedure Component Value Units Date/Time    EKG 12-LEAD on arrival to floor [[id number]]     Order Status:  Sent Lab Status:  No result         There are no hospital problems to display for this patient.      Discharged Condition: good    Follow Up:    Patient Instructions:   No discharge procedures on file.  Medications:  Reconciled Home Medications:      Medication List      CHANGE how you take these medications    clopidogrel 75 mg  tablet  Commonly known as:  PLAVIX  Take 1 tablet (75 mg total) by mouth once daily.  What changed:    · when to take this  · additional instructions        CONTINUE taking these medications    amLODIPine 10 MG tablet  Commonly known as:  NORVASC  Take 10 mg by mouth once daily.     aspirin 81 MG Chew  Take 1 tablet (81 mg total) by mouth once daily.     atorvastatin 40 MG tablet  Commonly known as:  LIPITOR  Take 1 tablet (40 mg total) by mouth once daily.     CIALIS 20 MG Tab  Generic drug:  tadalafil  Cialis 20 mg tablet   Take 1 pill by oral route every 3 days     dorzolamide 2 % ophthalmic solution  Commonly known as:  TRUSOPT  1 drop 3 (three) times daily.     PRORENAL 8 mg iron-800 mcg-1,000 unit Tab  Generic drug:  vit B,C-iron fum-FA-D3-zinc ox  Take 1 tablet by mouth once daily.     RENVELA 800 mg Tab  Generic drug:  sevelamer carbonate  800 mg 3 (three) times daily with meals.     SENSIPAR 30 MG Tab  Generic drug:  cinacalcet  Take 30 mg by mouth before dinner.     TOPROL  MG 24 hr tablet  Generic drug:  metoprolol succinate  Toprol  mg tablet,extended release   Take 1 tablet every day by oral route.            Time spent on the discharge of patient: 30 minutes    Lucille Ball MD  Interventional Cardiology  [facility]

## 2019-07-25 NOTE — Clinical Note
Catheter is inserted into the ostium   left main. Angiography performed of the left coronary arteries in multiple views.Corpac 4F JL4

## 2019-07-25 NOTE — CONSULTS
"Cardiac Rehab     Haroldo Dickinson   [id number]   7/25/2019       Activity taught: No    Cardiac Rehab Phase Taught: Phase I & II    Risk Factors-Modifiable: diabetes, nutrition, hypertension, obesity, sedentary lifestyle, stress, exercise    Risk Factors-Non modifiable: age, gender, race    Teaching Method: Verbal, Written and Living with Heart Disease book.    Understanding/Response: Pt states he has residual weakness from CVA and he attends physical therapy. Pt states he also goes to Dialysis 3 x per week (M/W/F).  Pt states he would not be able to attend cardiac rehab at this time.     Comments: S/P PTCA. Discussed cardiac rehab and risk factor modification. Educational materials were used in the process and given to the patient. They included "Your Guide to Living with Heart Disease", Phase Two Cardiac Rehabilitation information along with a sample Mediterranean diet.    MARCIA Talbert RN  Cardiac Rehab Nurse      "

## 2019-07-26 LAB
POC ACTIVATED CLOTTING TIME K: 197 SEC (ref 74–137)
SAMPLE: ABNORMAL

## 2019-07-26 NOTE — PROGRESS NOTES
Bedrest protocol complete. Pt ambulated down peralta with PCT. Pt tolerated well. Right groin dressing is clean, dry, and intact.

## 2019-07-26 NOTE — PROGRESS NOTES
Patient discharged per MD orders. Instructions given on medications, wound care, activity, signs of infection, when to call MD, and follow up appointments. Pt verbalized understanding.  Patient AAOx3, VSS, no c/o pain or discomfort at this time. Telemetry and PIV removed. Patient left unit via wheelchair with parent.

## 2019-07-26 NOTE — OP NOTE
DATE OF PROCEDURE:  07/23/2019    SURGEON:  Oz Cordoba M.D.    PREOPERATIVE DIAGNOSIS:  AV fistula stenosis, initial encounter.    POSTOPERATIVE DIAGNOSIS:  AV fistula stenosis, initial encounter.    PROCEDURES PERFORMED:  1.  Right upper extremity fistulogram.  2.  Ultrasound-guided access, right upper extremity fistula.  3.  Conscious sedation 60 minutes.    ANESTHESIA:  Local plus moderate sedation.    ESTIMATED BLOOD LOSS:  Minimal.    COMPLICATIONS:  None.    INDICATIONS FOR PROCEDURE:  The patient is a 43-year-old male who recently   presented to my clinic describing partial thrombosis of dialysis needles during   HD sessions recently.  A duplex ultrasound showed questionable long segment   moderate stenosis in the mid outflow fistula and therefore a fistulogram was   planned.    DESCRIPTION OF PROCEDURE:  The patient was identified as Haroldo Dickinson and   brought to the Catheterization Laboratory.  He was positioned supine on the   operating room table and the right upper extremity was prepped and draped in   standard sterile fashion.  Moderate sedation was undertaken by the nursing   staff.  Using ultrasound guidance, the AV fistula was accessed with a   micropuncture needle.  Micropuncture wire was advanced under fluoroscopic   guidance into the distal fistula.  A micropuncture sheath was placed and serial   fistulograms were performed of the AV fistula via venous outflow in the central   venous circulation.  There was a long smooth area of mild-to-moderate narrowing   that did not appear flow limiting nearly distal outflow aspect of the fistula.    There was no focal stenosis noted.  Central venogram demonstrated no sign of   hemodynamically significant stenosis.  At this time, given the fact that there   was no evidence of stenosis in the catheter and sheaths were removed and the   defect oversewn with a Monocryl suture.  Hemostasis was evident at the   completion of the procedure.    The patient  was transferred to the Recovery Room in stable condition.    Oz MORRIS was present for the entirety of the operation and supervised   60 minutes of conscious sedation time.  For exact dosages of medications given,   please see the nursing record.      STEPHEN/IN  dd: 07/26/2019 11:58:37 (CDT)  td: 07/26/2019 17:06:38 (CDT)  Doc ID   #[id number]  Job ID #[id number]    CC:

## 2019-07-26 NOTE — OP NOTE
Date: 07/23/2019    Surgeon(s) and Role:   Oz Cordoba MD - Primary      Pre-op Diagnosis:   ESRD    Post-op Diagnosis: Same     Procedure(s):   1) RUE fistulogram   2) Conscious sedation    Anesthesia: RN IV sedation    Findings/Key Components:   No significnat stenosis    EBL: Minimal    PROCEDURE IN DETAIL:  The patient was brought to the Cath Lab, placed in supine. R arm was prepped and draped in the standard surgical fashion. Under ultrasound guidance, the proximal aspect of the RUE was accessed with a micropuncture needle; ultrasound confirmed vessel patency, followed by placement of 4/3-Lithuanian micropuncture dilator. Fistulogram was performed and revealed no significant stenosis. The sheath was removed, 3-0 nylon U-stitch was placed with good hemostasis.    MODERATE SEDATION IN CATH LAB   Oz Cordoba MD was present for moderate sedation  Oz Cordoba MD monitored the patients cardio respiratory functions during the moderate sedation   See nurses notes for Intra-service start and end times and for the log of the name of the RN who administered the medicines for the moderate sedation, including their doseage and route.    Time of sedation:  60 mins.    Jamarcus Alfonso MD   Fellow, Vascular/Endovascular Surgery

## 2019-08-06 ENCOUNTER — HOSPITAL ENCOUNTER (OUTPATIENT)
Dept: VASCULAR SURGERY | Facility: CLINIC | Age: 44
Discharge: HOME | End: 2019-08-06
Attending: SURGERY
Payer: MEDICARE

## 2019-08-06 DIAGNOSIS — N18.6 END STAGE RENAL FAILURE ON DIALYSIS: Chronic | ICD-10-CM

## 2019-08-06 DIAGNOSIS — Z99.2 END STAGE RENAL FAILURE ON DIALYSIS: Chronic | ICD-10-CM

## 2019-08-06 PROCEDURE — 93990 PR DUPLEX HEMODIALYSIS ACCESS: ICD-10-PCS | Mod: 26,S$PBB,TXP, | Performed by: SURGERY

## 2019-08-06 PROCEDURE — 93990 DOPPLER FLOW TESTING: CPT | Mod: PBBFAC,NTX | Performed by: SURGERY

## 2019-08-06 PROCEDURE — 93990 DOPPLER FLOW TESTING: CPT | Mod: 26,S$PBB,TXP, | Performed by: SURGERY

## 2019-08-08 ENCOUNTER — TELEPHONE (OUTPATIENT)
Dept: VASCULAR SURGERY | Facility: CLINIC | Age: 44
End: 2019-08-08

## 2019-08-08 NOTE — TELEPHONE ENCOUNTER
Contacted patient in regard to not arriving for 0845 appt with Dr. Cordoba on 8/8/19. Pt states he forgot to call and reschedule appt. Appt rescheduled for 8/29/19, pt verified. Appt letter placed in mail.

## 2019-08-21 RX ORDER — AMLODIPINE BESYLATE 10 MG/1
10 TABLET ORAL DAILY
Qty: 30 TABLET | Refills: 3 | Status: ON HOLD | OUTPATIENT
Start: 2019-08-21 | End: 2023-11-29 | Stop reason: HOSPADM

## 2019-08-29 ENCOUNTER — OFFICE VISIT (OUTPATIENT)
Dept: VASCULAR SURGERY | Facility: CLINIC | Age: 44
End: 2019-08-29
Attending: SURGERY
Payer: MEDICARE

## 2019-08-29 VITALS
HEIGHT: 68 IN | HEART RATE: 67 BPM | TEMPERATURE: 99 F | BODY MASS INDEX: 26.4 KG/M2 | WEIGHT: 174.19 LBS | SYSTOLIC BLOOD PRESSURE: 125 MMHG | DIASTOLIC BLOOD PRESSURE: 81 MMHG

## 2019-08-29 DIAGNOSIS — T82.858D AV FISTULA STENOSIS, SUBSEQUENT ENCOUNTER: Primary | ICD-10-CM

## 2019-08-29 PROCEDURE — 99214 PR OFFICE/OUTPT VISIT, EST, LEVL IV, 30-39 MIN: ICD-10-PCS | Mod: S$PBB,NTX,, | Performed by: SURGERY

## 2019-08-29 PROCEDURE — 99999 PR PBB SHADOW E&M-EST. PATIENT-LVL III: CPT | Mod: PBBFAC,TXP,, | Performed by: SURGERY

## 2019-08-29 PROCEDURE — 99214 OFFICE O/P EST MOD 30 MIN: CPT | Mod: S$PBB,NTX,, | Performed by: SURGERY

## 2019-08-29 PROCEDURE — 99213 OFFICE O/P EST LOW 20 MIN: CPT | Mod: PBBFAC,TXP | Performed by: SURGERY

## 2019-08-29 PROCEDURE — 99999 PR PBB SHADOW E&M-EST. PATIENT-LVL III: ICD-10-PCS | Mod: PBBFAC,TXP,, | Performed by: SURGERY

## 2019-08-29 RX ORDER — FERRIC CITRATE 210 MG/1
420 TABLET, COATED ORAL
Status: ON HOLD | COMMUNITY
Start: 2019-08-02 | End: 2023-11-29 | Stop reason: HOSPADM

## 2019-08-29 RX ORDER — FENOFIBRATE 160 MG/1
160 TABLET ORAL DAILY
Refills: 0 | COMMUNITY
Start: 2019-08-19 | End: 2021-05-25

## 2019-09-09 DIAGNOSIS — Z99.2 END STAGE RENAL FAILURE ON DIALYSIS: Primary | ICD-10-CM

## 2019-09-09 DIAGNOSIS — N18.6 END STAGE RENAL FAILURE ON DIALYSIS: Primary | ICD-10-CM

## 2019-09-11 NOTE — PROGRESS NOTES
Haroldo Alok  09/10/2019    HPI:  Patient is a 43 y.o. male with a h/o HTN, ESRD with HD (MWF), DM who is here today for f/u of R RC AVF fistulagram in February 2019. He notes that he has had problems during dialysis with flow from the AVF into the machine, though he is able to complete dialysis when they gain access more distally. This started 1 week ago He denies swelling or pulsation of the AVF.     Underwent fistulagram Feb 2019 for presumed AVF thrombosis. Upper arm cephalic occluded and outflow via basilic. Renal failure due to HTN and is currently dialyzing M/W/F via the AVF. No problems with HD. Patient is right handed. Has been on dialysis since December 2013. He had a hypertensive stroke in December and has some residual left leg weakness. Walks with a walker.     Denies MI/ positive for stroke in 2013 (residual RLE weakness)  Tobacco use: denies    Past Medical History:   Diagnosis Date    Diabetes mellitus     Dialysis patient     DM type 2 causing renal disease, not at goal     ESRD (end stage renal disease) started dialysis 01/2014 6/5/2014    Hyperparathyroidism, secondary renal 6/5/2014    Hypertension     NSTEMI (non-ST elevated myocardial infarction) 12/21/2013    Organ transplant candidate 6/5/2014    Pneumonia     Renal hypertension     Stroke      Past Surgical History:   Procedure Laterality Date    COLONOSCOPY N/A 5/3/2017    Performed by Rufino Carpenter MD at [facility] ENDO (4TH FLR)    [id number]-SM Right 5/20/2014    Performed by Yaneth De La Cruz MD at [facility] OR 2ND FLR    DIALYSIS FISTULA CREATION      ESOPHAGOGASTRODUODENOSCOPY (EGD) N/A 5/3/2017    Performed by Rufino Carpenter MD at [facility] ENDO (4TH FLR)    Fistulogram Right 7/23/2019    Performed by Oz Cordoba MD at [facility] CATH LAB    Fistulogram N/A 2/18/2019    Performed by Yaneth De La Cruz MD at [facility] CATH LAB    FISTULOGRAM Right 6/4/2015    Performed by Ye Leonardo MD at [facility] CATH LAB    INSERTION, STENT,  CORONARY ARTERY N/A 7/25/2019    Performed by Miki Zendejas MD at [facility] CATH LAB    INSERTION-CATHETER-PERM-A-CATH Left 12/27/2013    Performed by Joshua Goldberg, MD at [facility] OR 2ND FLR    Left heart cath Left 7/25/2019    Performed by Miki Zendejas MD at [facility] CATH LAB    PERMCATH REMOVAL-TUNNELED CVC REMOVAL Right 8/28/2014    Performed by Ye Leonardo MD at [facility] CATH LAB    VASCULAR SURGERY       Family History   Problem Relation Age of Onset    Hypertension Mother     Diabetes Mother     Hypertension Father     Hypertension Sister     Kidney disease Brother     Hypertension Brother     Heart disease Brother     Cancer Neg Hx     Colon cancer Neg Hx     Esophageal cancer Neg Hx     Stomach cancer Neg Hx     Rectal cancer Neg Hx     Ulcerative colitis Neg Hx     Irritable bowel syndrome Neg Hx     Crohn's disease Neg Hx     Celiac disease Neg Hx      Social History     Socioeconomic History    Marital status: Single     Spouse name: Not on file    Number of children: Not on file    Years of education: Not on file    Highest education level: Not on file   Occupational History     Employer: [employer]   Social Needs    Financial resource strain: Not on file    Food insecurity:     Worry: Not on file     Inability: Not on file    Transportation needs:     Medical: Not on file     Non-medical: Not on file   Tobacco Use    Smoking status: Never Smoker    Smokeless tobacco: Never Used   Substance and Sexual Activity    Alcohol use: No    Drug use: No    Sexual activity: Yes     Partners: Female     Birth control/protection: None   Lifestyle    Physical activity:     Days per week: Not on file     Minutes per session: Not on file    Stress: Not on file   Relationships    Social connections:     Talks on phone: Not on file     Gets together: Not on file     Attends Church service: Not on file     Active member of club or organization: Not on file     Attends  meetings of clubs or organizations: Not on file     Relationship status: Not on file   Other Topics Concern    Not on file   Social History Narrative    Disabled     for one year    One son age 11    History of blood transfusion       Current Outpatient Medications:     amLODIPine (NORVASC) 10 MG tablet, Take 1 tablet (10 mg total) by mouth once daily., Disp: 30 tablet, Rfl: 3    aspirin 81 MG Chew, Take 1 tablet (81 mg total) by mouth once daily., Disp: 90 tablet, Rfl: 0    atorvastatin (LIPITOR) 40 MG tablet, Take 1 tablet (40 mg total) by mouth once daily., Disp: 90 tablet, Rfl: 3    AURYXIA 210 mg iron Tab, , Disp: , Rfl:     clopidogrel (PLAVIX) 75 mg tablet, Take 1 tablet (75 mg total) by mouth once daily., Disp: 30 tablet, Rfl: 5    dorzolamide (TRUSOPT) 2 % ophthalmic solution, 1 drop 3 (three) times daily. , Disp: , Rfl:     fenofibrate 160 MG Tab, Take 160 mg by mouth once daily., Disp: , Rfl: 0    metoprolol succinate (TOPROL XL) 100 MG 24 hr tablet, Toprol  mg tablet,extended release  Take 1 tablet every day by oral route., Disp: , Rfl:     PRORENAL 8 mg iron-800 mcg-1,000 unit Tab, Take 1 tablet by mouth once daily., Disp: , Rfl: 11    RENVELA 800 mg Tab, 800 mg 3 (three) times daily with meals. , Disp: , Rfl:     SENSIPAR 30 mg Tab, Take 30 mg by mouth before dinner., Disp: , Rfl: 11    tadalafil (CIALIS) 20 MG Tab, Cialis 20 mg tablet  Take 1 pill by oral route every 3 days, Disp: , Rfl:   No current facility-administered medications for this visit.     Facility-Administered Medications Ordered in Other Visits:     0.9%  NaCl infusion, , Intravenous, Continuous, Michelle Spence MD    ceFAZolin injection 2 g, 2 g, Intravenous, On Call Procedure, Michelle Spence MD    lidocaine (PF) 10 mg/ml (1%) injection 10 mg, 1 mL, Intradermal, Once, Michelle Spence MD    REVIEW OF SYSTEMS:  General: negative; ENT: negative; Allergy and Immunology: negative;  Hematological and Lymphatic: Negative; Endocrine: negative; Respiratory: no cough, shortness of breath, or wheezing; Cardiovascular: no chest pain or dyspnea on exertion; Gastrointestinal: no abdominal pain/back, change in bowel habits, or bloody stools; Genito-Urinary: no dysuria, trouble voiding, or hematuria; Musculoskeletal: negative  Neurological: no TIA or stroke symptoms; Psychiatric: no nervousness, anxiety or depression.    PHYSICAL EXAM:   Left Arm BP - Sittin/81 (19 0933)  Pulse: 67  Temp: 98.8 °F (37.1 °C)      General appearance:  Alert, well-appearing, and in no distress.  Oriented to person, place, and time   Neurological: Normal speech, no focal findings noted; CN II - XII grossly intact           Musculoskeletal: Digits/nail without cyanosis/clubbing.  Normal muscle strength/tone.                 Neck: Supple, no significant adenopathy; thyroid is not enlarged                  No carotid bruit can be auscultated                Chest:  Clear to auscultation, no wheezes, rales or rhonchi, symmetric air entry     No use of accessory muscles             Cardiac: Normal rate and regular rhythm, S1 and S2 normal; PMI non-displaced          Abdomen: Soft, nontender, nondistended, no masses or organomegaly     No rebound tenderness noted; bowel sounds normal     Pulsatile aortic mass is not palpable.     No groin adenopathy      Extremities:   2+ femoral pulses bilaterally     2+ pedal pulses palpable.     No pre-tibial edema     No ulcerations    LAB RESULTS:  Lab Results   Component Value Date    K 4.2 2019    K 4.3 2019    K 4.1 2017    CREATININE 10.7 (H) 2019    CREATININE 11.8 (H) 2019    CREATININE 15.4 (H) 2017     Lab Results   Component Value Date    WBC 7.36 2019    WBC 7.14 2019    WBC 8.98 2017    HCT 33.8 (L) 2019    HCT 34.1 (L) 2019    HCT 23.7 (L) 2017     (H) 2019     (H) 2019      (H) 03/04/2017     Lab Results   Component Value Date    HGBA1C 4.4 (L) 06/05/2014    HGBA1C 5.9 12/16/2013     IMAGING:  EXAM DUSTY: 08/06/2019 11:29  REF PHYS: NADREA BEAL      Indication:  End Stage Renal Disease on Dialysis.  Results:  Dialysis Access: Right Arm Radiocephalic fistula                    PSV [cm/s]        Flow Volume [ml/min]  ________________________________________________________________  Proximal to Anastomosis (artery)    432               -  Anastomosis       717               -  Distal to Anastomosis (vein)        568               -  Proximal AVF      96                -  Middle AVF        69                953  Distal AVF        85                -    Report Summary:  Impression:   Duplex ultrasound of the right Radiocephalic fistula reveals accelerated velocities at the inflow, anastomosis, and wrist level with a maximum velocity of 717cm/s at the anastomosis.  The volume flow is 953mL/min.    Report Summary:  Impression:   Color flow duplex exam reveals a patent right radiocephalic AV fistula. There is a visual narrowing just beyond the anastomosis at the distal wrist level; however, it does not appear to be flow limiting.  Flow in the cephalic vein is somewhat continuous   above the ACF. The thrombosed cephalic vein in the upper arm has apparently recanalized and is now seen to be patent. The vein appears diffusely narrowed at the level of the cephalic arch. There is no evidence of a focal hemodynamically significant   stenosis. Some thrombus is visualized at mid forearm level in dilated segments of the vein however it is not significantly narrowed to result in a focal increase in PSVs. Volume flow at mid forearm level measures 788 ml./min.    IMP/PLAN:  43 y.o. male with ESRD, HTN, DM with R RC AVF originally placed in 2013. He then had fistulagram in February 2019 that showed patent AVF, but R cephalic v. Occlusion with outflow via the basilic system. He returns today for  evaluation s/p repeat fistulagram which showed no new stenosis. HD progressing without incident.      1) continue asa, statin  2) RTC w duplex in 3 months or sooner if issues arise.     Oz Cordoba MD FACS  Vascular/Endovascular Surgery

## 2019-10-03 ENCOUNTER — OFFICE VISIT (OUTPATIENT)
Dept: VASCULAR SURGERY | Facility: CLINIC | Age: 44
End: 2019-10-03
Attending: SURGERY
Payer: MEDICARE

## 2019-10-03 ENCOUNTER — HOSPITAL ENCOUNTER (OUTPATIENT)
Dept: VASCULAR SURGERY | Facility: CLINIC | Age: 44
Discharge: HOME | End: 2019-10-03
Attending: SURGERY
Payer: MEDICARE

## 2019-10-03 VITALS
HEIGHT: 68 IN | SYSTOLIC BLOOD PRESSURE: 142 MMHG | WEIGHT: 171.94 LBS | DIASTOLIC BLOOD PRESSURE: 83 MMHG | TEMPERATURE: 99 F | BODY MASS INDEX: 26.06 KG/M2 | HEART RATE: 64 BPM

## 2019-10-03 DIAGNOSIS — Z99.2 END STAGE RENAL FAILURE ON DIALYSIS: ICD-10-CM

## 2019-10-03 DIAGNOSIS — T82.858D AV FISTULA STENOSIS, SUBSEQUENT ENCOUNTER: Primary | ICD-10-CM

## 2019-10-03 DIAGNOSIS — N18.6 END STAGE RENAL FAILURE ON DIALYSIS: ICD-10-CM

## 2019-10-03 PROCEDURE — 99214 OFFICE O/P EST MOD 30 MIN: CPT | Mod: S$PBB,NTX,, | Performed by: SURGERY

## 2019-10-03 PROCEDURE — 99213 OFFICE O/P EST LOW 20 MIN: CPT | Mod: PBBFAC,TXP,25 | Performed by: SURGERY

## 2019-10-03 PROCEDURE — 93990 PR DUPLEX HEMODIALYSIS ACCESS: ICD-10-PCS | Mod: 26,S$PBB,TXP, | Performed by: SURGERY

## 2019-10-03 PROCEDURE — 99999 PR PBB SHADOW E&M-EST. PATIENT-LVL III: ICD-10-PCS | Mod: PBBFAC,TXP,, | Performed by: SURGERY

## 2019-10-03 PROCEDURE — 99214 PR OFFICE/OUTPT VISIT, EST, LEVL IV, 30-39 MIN: ICD-10-PCS | Mod: S$PBB,NTX,, | Performed by: SURGERY

## 2019-10-03 PROCEDURE — 99999 PR PBB SHADOW E&M-EST. PATIENT-LVL III: CPT | Mod: PBBFAC,TXP,, | Performed by: SURGERY

## 2019-10-03 PROCEDURE — 93990 DOPPLER FLOW TESTING: CPT | Mod: 26,S$PBB,TXP, | Performed by: SURGERY

## 2019-10-03 PROCEDURE — 93990 DOPPLER FLOW TESTING: CPT | Mod: PBBFAC,TXP | Performed by: SURGERY

## 2019-10-03 NOTE — PROGRESS NOTES
Haroldo Alok  10/03/2019    HPI:  Patient is a 44 y.o. male with a h/o HTN, ESRD with HD (MWF), DM who is here today for f/u of R RC AVF fistulagram in July 2019. He notes he has not had problems with HD.    Underwent fistulagram Feb 2019 for presumed AVF thrombosis. Upper arm cephalic occluded and outflow via basilic. Repeat fistulagram in July 2019 showed no significant stenosis. Renal failure due to HTN and is currently dialyzing M/W/F via the AVF. No problems with HD. Patient is right handed. Has been on dialysis since December 2013. He had a hypertensive stroke in December and has some residual left leg weakness. Walks with a walker.     Denies MI/ positive for stroke in 2013 (residual RLE weakness)  Tobacco use: denies    Past Medical History:   Diagnosis Date    Diabetes mellitus     Dialysis patient     DM type 2 causing renal disease, not at goal     ESRD (end stage renal disease) started dialysis 01/2014 6/5/2014    Hyperparathyroidism, secondary renal 6/5/2014    Hypertension     NSTEMI (non-ST elevated myocardial infarction) 12/21/2013    Organ transplant candidate 6/5/2014    Pneumonia     Renal hypertension     Stroke      Past Surgical History:   Procedure Laterality Date    COLONOSCOPY N/A 5/3/2017    Procedure: COLONOSCOPY;  Surgeon: Rufino Carpenter MD;  Location: [facility] ENDO ([address] [place] [address]);  Service: Endoscopy;  Laterality: N/A;  pt states can only schedule on Wednesdays    CORONARY STENT PLACEMENT N/A 7/25/2019    Procedure: INSERTION, STENT, CORONARY ARTERY;  Surgeon: Miki Zendejas MD;  Location: [facility] CATH LAB;  Service: Cardiology;  Laterality: N/A;    DIALYSIS FISTULA CREATION      FISTULOGRAM N/A 2/18/2019    Procedure: Fistulogram;  Surgeon: Yaneth De La Cruz MD;  Location: [facility] CATH LAB;  Service: Cardiology;  Laterality: N/A;    FISTULOGRAM Right 7/23/2019    Procedure: Fistulogram;  Surgeon: Oz Cordoba MD;  Location: [facility] CATH LAB;  Service: Cardiology;  Laterality:  Right;    LEFT HEART CATHETERIZATION Left 7/25/2019    Procedure: Left heart cath;  Surgeon: Miki Zendejas MD;  Location: [facility] CATH LAB;  Service: Cardiology;  Laterality: Left;    VASCULAR SURGERY       Family History   Problem Relation Age of Onset    Hypertension Mother     Diabetes Mother     Hypertension Father     Hypertension Sister     Kidney disease Brother     Hypertension Brother     Heart disease Brother     Cancer Neg Hx     Colon cancer Neg Hx     Esophageal cancer Neg Hx     Stomach cancer Neg Hx     Rectal cancer Neg Hx     Ulcerative colitis Neg Hx     Irritable bowel syndrome Neg Hx     Crohn's disease Neg Hx     Celiac disease Neg Hx      Social History     Socioeconomic History    Marital status: Single     Spouse name: Not on file    Number of children: Not on file    Years of education: Not on file    Highest education level: Not on file   Occupational History     Employer: [employer]   Social Needs    Financial resource strain: Not on file    Food insecurity:     Worry: Not on file     Inability: Not on file    Transportation needs:     Medical: Not on file     Non-medical: Not on file   Tobacco Use    Smoking status: Never Smoker    Smokeless tobacco: Never Used   Substance and Sexual Activity    Alcohol use: No    Drug use: No    Sexual activity: Yes     Partners: Female     Birth control/protection: None   Lifestyle    Physical activity:     Days per week: Not on file     Minutes per session: Not on file    Stress: Not on file   Relationships    Social connections:     Talks on phone: Not on file     Gets together: Not on file     Attends Alevism service: Not on file     Active member of club or organization: Not on file     Attends meetings of clubs or organizations: Not on file     Relationship status: Not on file   Other Topics Concern    Not on file   Social History Narrative    Disabled     for one year    One son age 11    History of  blood transfusion       Current Outpatient Medications:     amLODIPine (NORVASC) 10 MG tablet, Take 1 tablet (10 mg total) by mouth once daily., Disp: 30 tablet, Rfl: 3    aspirin 81 MG Chew, Take 1 tablet (81 mg total) by mouth once daily., Disp: 90 tablet, Rfl: 0    atorvastatin (LIPITOR) 40 MG tablet, Take 1 tablet (40 mg total) by mouth once daily., Disp: 90 tablet, Rfl: 3    AURYXIA 210 mg iron Tab, , Disp: , Rfl:     clopidogrel (PLAVIX) 75 mg tablet, Take 1 tablet (75 mg total) by mouth once daily., Disp: 30 tablet, Rfl: 5    dorzolamide (TRUSOPT) 2 % ophthalmic solution, 1 drop 3 (three) times daily. , Disp: , Rfl:     fenofibrate 160 MG Tab, Take 160 mg by mouth once daily., Disp: , Rfl: 0    metoprolol succinate (TOPROL XL) 100 MG 24 hr tablet, Toprol  mg tablet,extended release  Take 1 tablet every day by oral route., Disp: , Rfl:     PRORENAL 8 mg iron-800 mcg-1,000 unit Tab, Take 1 tablet by mouth once daily., Disp: , Rfl: 11    RENVELA 800 mg Tab, 800 mg 3 (three) times daily with meals. , Disp: , Rfl:     SENSIPAR 30 mg Tab, Take 30 mg by mouth before dinner., Disp: , Rfl: 11    tadalafil (CIALIS) 20 MG Tab, Cialis 20 mg tablet  Take 1 pill by oral route every 3 days, Disp: , Rfl:   No current facility-administered medications for this visit.     Facility-Administered Medications Ordered in Other Visits:     0.9%  NaCl infusion, , Intravenous, Continuous, Michelle Spence MD    ceFAZolin injection 2 g, 2 g, Intravenous, On Call Procedure, Michelle Spence MD    lidocaine (PF) 10 mg/ml (1%) injection 10 mg, 1 mL, Intradermal, Once, Michelle Spence MD    REVIEW OF SYSTEMS:  General: negative; ENT: negative; Allergy and Immunology: negative; Hematological and Lymphatic: Negative; Endocrine: negative; Respiratory: no cough, shortness of breath, or wheezing; Cardiovascular: no chest pain or dyspnea on exertion; Gastrointestinal: no abdominal pain/back, change in bowel  habits, or bloody stools; Genito-Urinary: no dysuria, trouble voiding, or hematuria; Musculoskeletal: negative  Neurological: no TIA or stroke symptoms; Psychiatric: no nervousness, anxiety or depression.    PHYSICAL EXAM:   Left Arm BP - Sittin/83 (10/03/19 0927)  Pulse: 64  Temp: 98.5 °F (36.9 °C)      General appearance:  Alert, well-appearing, and in no distress.  Oriented to person, place, and time   Neurological: Normal speech, no focal findings noted; CN II - XII grossly intact           Musculoskeletal: Digits/nail without cyanosis/clubbing.  Normal muscle strength/tone.                 Neck: Supple, no significant adenopathy; thyroid is not enlarged                  No carotid bruit can be auscultated                Chest:  Clear to auscultation, no wheezes, rales or rhonchi, symmetric air entry     No use of accessory muscles             Cardiac: Normal rate and regular rhythm, S1 and S2 normal; PMI non-displaced          Abdomen: Soft, nontender, nondistended, no masses or organomegaly     No rebound tenderness noted; bowel sounds normal     Pulsatile aortic mass is not palpable.     No groin adenopathy      Extremities:   2+ femoral pulses bilaterally     2+ pedal pulses palpable.     No pre-tibial edema     No ulcerations    LAB RESULTS:  Lab Results   Component Value Date    K 4.2 2019    K 4.3 2019    K 4.1 2017    CREATININE 10.7 (H) 2019    CREATININE 11.8 (H) 2019    CREATININE 15.4 (H) 2017     Lab Results   Component Value Date    WBC 7.36 2019    WBC 7.14 2019    WBC 8.98 2017    HCT 33.8 (L) 2019    HCT 34.1 (L) 2019    HCT 23.7 (L) 2017     (H) 2019     (H) 2019     (H) 2017     Lab Results   Component Value Date    HGBA1C 4.4 (L) 2014    HGBA1C 5.9 2013     IMAGING:  EXAM DUSTY: 10/03/2019  REF PHYS: ANDREA BEAL      Indication:  ESRD on  dialysis.  Results:  Dialysis Access: Right Arm Radiocephalic fistula                    PSV [cm/s]        Flow Volume [ml/min]  ________________________________________________________________  Proximal to Anastomosis (artery)    388               -  Anastomosis       635               -  Distal to Anastomosis (vein)        179               -  Proximal AVF      93                -  Middle AVF        80                812  Distal AVF        73                -    PSV's measured in cm/s: ACF= 47.    Report Summary:  Impression:   Duplex ultrasound of the right Radiocephalic fistula reveals accelerated velocities at the inflow and anastomosis with a maximum velocity of 635 cm/s at the anastomosis. Thrombus along the vessel wall is noted at the prox/mid and mid segment. However, it   does not appear to cause a hemodynamically significant stenosis. The volume flow is 812 mL/min.    IMP/PLAN:  44 y.o. male with ESRD, HTN, DM with R RC AVF originally placed in 2013. He then had fistulagram in February 2019 that showed patent AVF, but R cephalic v. Occlusion with outflow via the basilic system. He had another fistulagram in July 2019 that showed no significant stenosis. He returns today for follow up evaluation. HD progressing without incident.      1) continue asa, statin  2) RTC w duplex in 3 months or sooner if issues arise.     Oz Cordoba MD FACS  Vascular/Endovascular Surgery

## 2019-10-29 DIAGNOSIS — Z76.82 ORGAN TRANSPLANT CANDIDATE: Primary | ICD-10-CM

## 2019-11-21 PROBLEM — I25.10 CAD (CORONARY ARTERY DISEASE), NATIVE CORONARY ARTERY: Status: ACTIVE | Noted: 2019-11-21

## 2019-11-21 PROBLEM — I10 ESSENTIAL HYPERTENSION: Status: ACTIVE | Noted: 2019-11-21

## 2019-11-21 PROBLEM — E78.2 MIXED HYPERLIPIDEMIA: Status: ACTIVE | Noted: 2019-11-21

## 2019-11-21 NOTE — ASSESSMENT & PLAN NOTE
- s/p +ve SPECT (pre-kidney tx) no symptoms  - mRCA 80-90% with PCI x 2 to mRCA 7/26/19  - Continue DAPT until at least 2/2020 then can be stopped if needed  - Cont metoprolol succinate  - High dose statin

## 2019-11-21 NOTE — PROGRESS NOTES
Subjective:    Patient ID:  Haroldo Dickinson is a 44 y.o. male who presents for follow-up of CAD    HPI  43 M with PMH of HTN, CVA w residual weakness to R isde, ESRD on HD, CAD. He received a SPECT in the summer as part of pre-kidney tx that showed inferoapical ischemia s/p LHC and PCI to mRCA x2 [facility] (Zendejas). He denies chest pain, SOB or edema. He has been compliant with his DAPT as instructed. His blood pressure is elevated at 174/94 and has been high in past, he has not had dialysis today. He is not taking statin yet.    Review of Systems   Constitution: Negative for chills, decreased appetite and diaphoresis.   HENT: Negative for congestion and ear discharge.    Eyes: Negative for blurred vision and discharge.   Cardiovascular: Negative for chest pain, dyspnea on exertion, irregular heartbeat, leg swelling and paroxysmal nocturnal dyspnea.   Respiratory: Negative for cough, hemoptysis and shortness of breath.    Gastrointestinal: Negative for abdominal pain.        Objective:    Physical Exam   Constitutional: He is oriented to person, place, and time. He appears well-developed and well-nourished. No distress.   Eyes: Pupils are equal, round, and reactive to light. Conjunctivae are normal.   Neck: No tracheal deviation present. No thyromegaly present.   Cardiovascular: Normal rate, regular rhythm, normal heart sounds and intact distal pulses. Exam reveals no gallop and no friction rub.   No murmur heard.  Pulses:       Radial pulses are 2+ on the right side, and 2+ on the left side.        Femoral pulses are 2+ on the right side, and 2+ on the left side.  Pulmonary/Chest: Effort normal and breath sounds normal. No respiratory distress. He has no wheezes. He has no rales.   Abdominal: Soft. Bowel sounds are normal. He exhibits no distension. There is no tenderness.   Musculoskeletal: He exhibits no edema or deformity.   Neurological: He is alert and oriented to person, place, and time. No cranial nerve deficit.  Coordination normal.   Skin: Skin is warm and dry. He is not diaphoretic.   Psychiatric: He has a normal mood and affect. His behavior is normal.         Assessment:       1. Cerebrovascular accident (CVA) due to embolism of other precerebral artery    2. Coronary artery disease involving native coronary artery of native heart without angina pectoris    3. Essential hypertension    4. Mixed hyperlipidemia         Plan:       Stroke  - Continue ASA  - Physical tx      CAD (coronary artery disease), native coronary artery  - s/p +ve SPECT (pre-kidney tx) no symptoms  - mRCA 80-90% with PCI x 2 to mRCA 7/26/19  - Continue DAPT until at least 2/2020 then can be stopped if needed  - Cont metoprolol succinate  - High dose statin    Essential hypertension  - Continue Amlodipine, Metop succinate  - Add 25 mg Losartan and recheck  - High pressure today  - avoid salty foods, exercise, need mediterrenan diet    Mixed hyperlipidemia  - Did not  Lipitor yet, reordered  Lipitor 40 and encouraged to   - HDL 47, LDL 56

## 2019-11-21 NOTE — ASSESSMENT & PLAN NOTE
- Continue Amlodipine, Metop succinate  - Add 10 mg Lisinopril and recheck  - High pressure today  - avoid salty foods, exercise, need mediterrenan diet

## 2019-11-22 ENCOUNTER — OFFICE VISIT (OUTPATIENT)
Dept: CARDIOLOGY | Facility: CLINIC | Age: 44
End: 2019-11-22
Payer: MEDICARE

## 2019-11-22 VITALS
WEIGHT: 182.13 LBS | HEART RATE: 78 BPM | BODY MASS INDEX: 27.6 KG/M2 | DIASTOLIC BLOOD PRESSURE: 94 MMHG | SYSTOLIC BLOOD PRESSURE: 174 MMHG | HEIGHT: 68 IN

## 2019-11-22 DIAGNOSIS — I25.10 CORONARY ARTERY DISEASE INVOLVING NATIVE CORONARY ARTERY OF NATIVE HEART WITHOUT ANGINA PECTORIS: ICD-10-CM

## 2019-11-22 DIAGNOSIS — E78.2 MIXED HYPERLIPIDEMIA: ICD-10-CM

## 2019-11-22 DIAGNOSIS — I10 ESSENTIAL HYPERTENSION: ICD-10-CM

## 2019-11-22 DIAGNOSIS — I63.19 CEREBROVASCULAR ACCIDENT (CVA) DUE TO EMBOLISM OF OTHER PRECEREBRAL ARTERY: ICD-10-CM

## 2019-11-22 PROCEDURE — 99999 PR PBB SHADOW E&M-EST. PATIENT-LVL IV: ICD-10-PCS | Mod: PBBFAC,GC,TXP, | Performed by: STUDENT IN AN ORGANIZED HEALTH CARE EDUCATION/TRAINING PROGRAM

## 2019-11-22 PROCEDURE — 99214 OFFICE O/P EST MOD 30 MIN: CPT | Mod: S$PBB,GC,NTX, | Performed by: STUDENT IN AN ORGANIZED HEALTH CARE EDUCATION/TRAINING PROGRAM

## 2019-11-22 PROCEDURE — 99999 PR PBB SHADOW E&M-EST. PATIENT-LVL IV: CPT | Mod: PBBFAC,GC,TXP, | Performed by: STUDENT IN AN ORGANIZED HEALTH CARE EDUCATION/TRAINING PROGRAM

## 2019-11-22 PROCEDURE — 99214 OFFICE O/P EST MOD 30 MIN: CPT | Mod: PBBFAC,NTX | Performed by: STUDENT IN AN ORGANIZED HEALTH CARE EDUCATION/TRAINING PROGRAM

## 2019-11-22 PROCEDURE — 99214 PR OFFICE/OUTPT VISIT, EST, LEVL IV, 30-39 MIN: ICD-10-PCS | Mod: S$PBB,GC,NTX, | Performed by: STUDENT IN AN ORGANIZED HEALTH CARE EDUCATION/TRAINING PROGRAM

## 2019-11-22 RX ORDER — LOSARTAN POTASSIUM 25 MG/1
25 TABLET ORAL DAILY
Qty: 90 TABLET | Refills: 3 | Status: SHIPPED | OUTPATIENT
Start: 2019-11-22 | End: 2020-02-06

## 2019-11-22 RX ORDER — ATORVASTATIN CALCIUM 40 MG/1
40 TABLET, FILM COATED ORAL DAILY
Qty: 90 TABLET | Refills: 3 | Status: ON HOLD | OUTPATIENT
Start: 2019-11-22 | End: 2021-02-09

## 2019-11-22 RX ORDER — LISINOPRIL 10 MG/1
10 TABLET ORAL DAILY
Qty: 90 TABLET | Refills: 3 | Status: SHIPPED | OUTPATIENT
Start: 2019-11-22 | End: 2019-11-22 | Stop reason: ALTCHOICE

## 2019-12-02 ENCOUNTER — TELEPHONE (OUTPATIENT)
Dept: VASCULAR SURGERY | Facility: CLINIC | Age: 44
End: 2019-12-02

## 2019-12-02 DIAGNOSIS — Z99.2 END STAGE RENAL FAILURE ON DIALYSIS: Primary | ICD-10-CM

## 2019-12-02 DIAGNOSIS — N18.6 END STAGE RENAL FAILURE ON DIALYSIS: Primary | ICD-10-CM

## 2019-12-02 NOTE — TELEPHONE ENCOUNTER
----- Message from Kendy Parmar sent at 12/2/2019  1:17 PM CST -----  Contact: Lindsay from [facility]  Lindsay is requesting a callback at [phone] says pt is in Dialysis and need to schedule an office visit says he is running a fever

## 2019-12-03 ENCOUNTER — TELEPHONE (OUTPATIENT)
Dept: TRANSPLANT | Facility: CLINIC | Age: 44
End: 2019-12-03

## 2019-12-05 ENCOUNTER — OFFICE VISIT (OUTPATIENT)
Dept: VASCULAR SURGERY | Facility: CLINIC | Age: 44
End: 2019-12-05
Attending: SURGERY
Payer: MEDICARE

## 2019-12-05 ENCOUNTER — HOSPITAL ENCOUNTER (OUTPATIENT)
Dept: VASCULAR SURGERY | Facility: CLINIC | Age: 44
Discharge: HOME | End: 2019-12-05
Attending: SURGERY
Payer: MEDICARE

## 2019-12-05 VITALS
TEMPERATURE: 98 F | BODY MASS INDEX: 26.6 KG/M2 | WEIGHT: 175.5 LBS | SYSTOLIC BLOOD PRESSURE: 161 MMHG | DIASTOLIC BLOOD PRESSURE: 93 MMHG | HEIGHT: 68 IN | HEART RATE: 59 BPM

## 2019-12-05 DIAGNOSIS — Z99.2 END STAGE RENAL FAILURE ON DIALYSIS: Primary | ICD-10-CM

## 2019-12-05 DIAGNOSIS — Z99.2 END STAGE RENAL FAILURE ON DIALYSIS: ICD-10-CM

## 2019-12-05 DIAGNOSIS — N18.6 END STAGE RENAL FAILURE ON DIALYSIS: Primary | ICD-10-CM

## 2019-12-05 DIAGNOSIS — N18.6 END STAGE RENAL FAILURE ON DIALYSIS: ICD-10-CM

## 2019-12-05 DIAGNOSIS — T82.858D AV FISTULA STENOSIS, SUBSEQUENT ENCOUNTER: Primary | ICD-10-CM

## 2019-12-05 DIAGNOSIS — Z01.818 PREOP EXAMINATION: Primary | ICD-10-CM

## 2019-12-05 PROCEDURE — 93990 PR DUPLEX HEMODIALYSIS ACCESS: ICD-10-PCS | Mod: 26,S$PBB,TXP, | Performed by: SURGERY

## 2019-12-05 PROCEDURE — 93990 DOPPLER FLOW TESTING: CPT | Mod: PBBFAC,NTX | Performed by: SURGERY

## 2019-12-05 PROCEDURE — 99213 OFFICE O/P EST LOW 20 MIN: CPT | Mod: PBBFAC,TXP,25 | Performed by: SURGERY

## 2019-12-05 PROCEDURE — 93990 DOPPLER FLOW TESTING: CPT | Mod: 26,S$PBB,TXP, | Performed by: SURGERY

## 2019-12-05 PROCEDURE — 99214 PR OFFICE/OUTPT VISIT, EST, LEVL IV, 30-39 MIN: ICD-10-PCS | Mod: S$PBB,NTX,, | Performed by: SURGERY

## 2019-12-05 PROCEDURE — 99999 PR PBB SHADOW E&M-EST. PATIENT-LVL III: ICD-10-PCS | Mod: PBBFAC,TXP,, | Performed by: SURGERY

## 2019-12-05 PROCEDURE — 99999 PR PBB SHADOW E&M-EST. PATIENT-LVL III: CPT | Mod: PBBFAC,TXP,, | Performed by: SURGERY

## 2019-12-05 PROCEDURE — 99214 OFFICE O/P EST MOD 30 MIN: CPT | Mod: S$PBB,NTX,, | Performed by: SURGERY

## 2019-12-05 NOTE — PROGRESS NOTES
Haroldo Alok  12/05/2019    HPI:  Patient is a 44 y.o. male with a h/o HTN, ESRD with HD (MWF), DM who is here today for f/u of R RC AVF fistulagram in July 2019. He notes he has not had problems with HD.    Underwent fistulagram Feb 2019 for presumed AVF thrombosis. Upper arm cephalic occluded and outflow via basilic. Repeat fistulagram in July 2019 showed no significant stenosis. Renal failure due to HTN and is currently dialyzing M/W/F via the AVF. No problems with HD. Patient is right handed. Has been on dialysis since December 2013. He had a hypertensive stroke in December and has some residual left leg weakness. Walks with a walker.     Denies MI/ positive for stroke in 2013 (residual RLE weakness)  Tobacco use: denies    Past Medical History:   Diagnosis Date    CAD (coronary artery disease), native coronary artery 11/21/2019    Diabetes mellitus     Dialysis patient     DM type 2 causing renal disease, not at goal     ESRD (end stage renal disease) started dialysis 01/2014 6/5/2014    Hyperparathyroidism, secondary renal 6/5/2014    Hypertension     NSTEMI (non-ST elevated myocardial infarction) 12/21/2013    Organ transplant candidate 6/5/2014    Pneumonia     Renal hypertension     Stroke      Past Surgical History:   Procedure Laterality Date    COLONOSCOPY N/A 5/3/2017    Procedure: COLONOSCOPY;  Surgeon: Rufino Carpenter MD;  Location: [facility] ENDO ([address] [place] [address]);  Service: Endoscopy;  Laterality: N/A;  pt states can only schedule on Wednesdays    CORONARY STENT PLACEMENT N/A 7/25/2019    Procedure: INSERTION, STENT, CORONARY ARTERY;  Surgeon: Miki Zendejas MD;  Location: [facility] CATH LAB;  Service: Cardiology;  Laterality: N/A;    DIALYSIS FISTULA CREATION      FISTULOGRAM N/A 2/18/2019    Procedure: Fistulogram;  Surgeon: Yaneth De La Cruz MD;  Location: [facility] CATH LAB;  Service: Cardiology;  Laterality: N/A;    FISTULOGRAM Right 7/23/2019    Procedure: Fistulogram;  Surgeon: Oz Cordoba,  MD;  Location: [facility] CATH LAB;  Service: Cardiology;  Laterality: Right;    LEFT HEART CATHETERIZATION Left 7/25/2019    Procedure: Left heart cath;  Surgeon: Miki Zendejas MD;  Location: [facility] CATH LAB;  Service: Cardiology;  Laterality: Left;    VASCULAR SURGERY       Family History   Problem Relation Age of Onset    Hypertension Mother     Diabetes Mother     Hypertension Father     Hypertension Sister     Kidney disease Brother     Hypertension Brother     Heart disease Brother     Cancer Neg Hx     Colon cancer Neg Hx     Esophageal cancer Neg Hx     Stomach cancer Neg Hx     Rectal cancer Neg Hx     Ulcerative colitis Neg Hx     Irritable bowel syndrome Neg Hx     Crohn's disease Neg Hx     Celiac disease Neg Hx      Social History     Socioeconomic History    Marital status: Single     Spouse name: Not on file    Number of children: Not on file    Years of education: Not on file    Highest education level: Not on file   Occupational History     Employer: [employer]   Social Needs    Financial resource strain: Not on file    Food insecurity:     Worry: Not on file     Inability: Not on file    Transportation needs:     Medical: Not on file     Non-medical: Not on file   Tobacco Use    Smoking status: Never Smoker    Smokeless tobacco: Never Used   Substance and Sexual Activity    Alcohol use: No    Drug use: No    Sexual activity: Yes     Partners: Female     Birth control/protection: None   Lifestyle    Physical activity:     Days per week: Not on file     Minutes per session: Not on file    Stress: Not on file   Relationships    Social connections:     Talks on phone: Not on file     Gets together: Not on file     Attends Jehovah's witness service: Not on file     Active member of club or organization: Not on file     Attends meetings of clubs or organizations: Not on file     Relationship status: Not on file   Other Topics Concern    Not on file   Social History Narrative     Disabled     for one year    One son age 11    History of blood transfusion       Current Outpatient Medications:     amLODIPine (NORVASC) 10 MG tablet, Take 1 tablet (10 mg total) by mouth once daily., Disp: 30 tablet, Rfl: 3    aspirin 81 MG Chew, Take 1 tablet (81 mg total) by mouth once daily., Disp: 90 tablet, Rfl: 0    atorvastatin (LIPITOR) 40 MG tablet, Take 1 tablet (40 mg total) by mouth once daily., Disp: 90 tablet, Rfl: 3    AURYXIA 210 mg iron Tab, , Disp: , Rfl:     clopidogrel (PLAVIX) 75 mg tablet, Take 1 tablet (75 mg total) by mouth once daily., Disp: 30 tablet, Rfl: 5    dorzolamide (TRUSOPT) 2 % ophthalmic solution, 1 drop 3 (three) times daily. , Disp: , Rfl:     fenofibrate 160 MG Tab, Take 160 mg by mouth once daily., Disp: , Rfl: 0    losartan (COZAAR) 25 MG tablet, Take 1 tablet (25 mg total) by mouth once daily., Disp: 90 tablet, Rfl: 3    metoprolol succinate (TOPROL XL) 100 MG 24 hr tablet, Toprol  mg tablet,extended release  Take 1 tablet every day by oral route., Disp: , Rfl:     PRORENAL 8 mg iron-800 mcg-1,000 unit Tab, Take 1 tablet by mouth once daily., Disp: , Rfl: 11    RENVELA 800 mg Tab, 800 mg 3 (three) times daily with meals. , Disp: , Rfl:     SENSIPAR 30 mg Tab, Take 30 mg by mouth before dinner., Disp: , Rfl: 11    tadalafil (CIALIS) 20 MG Tab, Cialis 20 mg tablet  Take 1 pill by oral route every 3 days, Disp: , Rfl:   No current facility-administered medications for this visit.     Facility-Administered Medications Ordered in Other Visits:     0.9%  NaCl infusion, , Intravenous, Continuous, Michelle Spence MD    ceFAZolin injection 2 g, 2 g, Intravenous, On Call Procedure, Michelle Spence MD    lidocaine (PF) 10 mg/ml (1%) injection 10 mg, 1 mL, Intradermal, Once, Michelle Spence MD    REVIEW OF SYSTEMS:  General: negative; ENT: negative; Allergy and Immunology: negative; Hematological and Lymphatic: Negative; Endocrine:  negative; Respiratory: no cough, shortness of breath, or wheezing; Cardiovascular: no chest pain or dyspnea on exertion; Gastrointestinal: no abdominal pain/back, change in bowel habits, or bloody stools; Genito-Urinary: no dysuria, trouble voiding, or hematuria; Musculoskeletal: negative  Neurological: no TIA or stroke symptoms; Psychiatric: no nervousness, anxiety or depression.    PHYSICAL EXAM:   Left Arm BP - Sittin/93 (19 1015)  Pulse: (!) 59  Temp: 98.1 °F (36.7 °C)      General appearance:  Alert, well-appearing, and in no distress.  Oriented to person, place, and time   Neurological: Normal speech, no focal findings noted; CN II - XII grossly intact           Musculoskeletal: Digits/nail without cyanosis/clubbing.  Normal muscle strength/tone.                 Neck: Supple, no significant adenopathy; thyroid is not enlarged                  No carotid bruit can be auscultated                Chest:  Clear to auscultation, no wheezes, rales or rhonchi, symmetric air entry     No use of accessory muscles             Cardiac: Normal rate and regular rhythm, S1 and S2 normal; PMI non-displaced          Abdomen: Soft, nontender, nondistended, no masses or organomegaly     No rebound tenderness noted; bowel sounds normal     Pulsatile aortic mass is not palpable.     No groin adenopathy      Extremities:   2+ femoral pulses bilaterally     2+ pedal pulses palpable.     No pre-tibial edema     No ulcerations    LAB RESULTS:  Lab Results   Component Value Date    K 4.2 2019    K 4.3 2019    K 4.1 2017    CREATININE 10.7 (H) 2019    CREATININE 11.8 (H) 2019    CREATININE 15.4 (H) 2017     Lab Results   Component Value Date    WBC 7.36 2019    WBC 7.14 2019    WBC 8.98 2017    HCT 33.8 (L) 2019    HCT 34.1 (L) 2019    HCT 23.7 (L) 2017     (H) 2019     (H) 2019     (H) 2017     Lab Results    Component Value Date    HGBA1C 4.4 (L) 06/05/2014    HGBA1C 5.9 12/16/2013     IMAGING:  EXAM DUSTY: 12/05/2019 09:34  REF PHYS: OZ BEAL      Indication:  ESRD.  Results:  Dialysis Access: Right Arm Radiocephalic fistula                    PSV [cm/s]        Flow Volume [ml/min]  ________________________________________________________________  Proximal to Anastomosis (artery)    658               -  Anastomosis       706               -  Proximal AVF      75                -  Middle AVF        60                699  Distal AVF        99                -    Rt. ACF= 146 cm/sec.    Report Summary:  Impression:   Duplex ultrasound of the right Radiocephalic fistula reveals accelerated velocities at the inflow ( 658 cm/sec) and anastomosis ( 706 cm/sec). A visual narrowing can only be visualized at the inflow artery, which suggest a focal hemodynamically   significant stenosis. Thrombus along the vessel wall is noted at the prox/mid and mid segment. The volume flow is 699 mL/min.    IMP/PLAN:  44 y.o. male with ESRD, HTN, DM with R RC AVF originally placed in 2013. He then had fistulagram in February 2019 that showed patent AVF, but R cephalic v. occlusion with outflow via the basilic system. He had another fistulagram in July 2019 that showed no significant stenosis. He returns today for follow up evaluation. HD progressing without incident.    1) continue asa, statin  2) RTC w duplex in 1 month or sooner if issues arise.     Oz Beal MD FACS  Vascular/Endovascular Surgery

## 2020-01-02 ENCOUNTER — LAB VISIT (OUTPATIENT)
Dept: LAB | Facility: HOSPITAL | Age: 45
End: 2020-01-02
Payer: MEDICARE

## 2020-01-02 DIAGNOSIS — Z01.818 PREOP EXAMINATION: ICD-10-CM

## 2020-01-02 DIAGNOSIS — I25.10 CORONARY ARTERY DISEASE INVOLVING NATIVE CORONARY ARTERY OF NATIVE HEART WITHOUT ANGINA PECTORIS: ICD-10-CM

## 2020-01-02 LAB
ALBUMIN SERPL BCP-MCNC: 3.5 G/DL (ref 3.5–5.2)
ALP SERPL-CCNC: 62 U/L (ref 55–135)
ALT SERPL W/O P-5'-P-CCNC: 83 U/L (ref 10–44)
ANION GAP SERPL CALC-SCNC: 12 MMOL/L (ref 8–16)
AST SERPL-CCNC: 57 U/L (ref 10–40)
BASOPHILS # BLD AUTO: 0.1 K/UL (ref 0–0.2)
BASOPHILS NFR BLD: 1.2 % (ref 0–1.9)
BILIRUB SERPL-MCNC: 0.8 MG/DL (ref 0.1–1)
BUN SERPL-MCNC: 63 MG/DL (ref 6–20)
CALCIUM SERPL-MCNC: 8.7 MG/DL (ref 8.7–10.5)
CHLORIDE SERPL-SCNC: 100 MMOL/L (ref 95–110)
CHOLEST SERPL-MCNC: 111 MG/DL (ref 120–199)
CHOLEST/HDLC SERPL: 2 {RATIO} (ref 2–5)
CO2 SERPL-SCNC: 29 MMOL/L (ref 23–29)
CREAT SERPL-MCNC: 12.4 MG/DL (ref 0.5–1.4)
DIFFERENTIAL METHOD: ABNORMAL
EOSINOPHIL # BLD AUTO: 0.4 K/UL (ref 0–0.5)
EOSINOPHIL NFR BLD: 4.8 % (ref 0–8)
ERYTHROCYTE [DISTWIDTH] IN BLOOD BY AUTOMATED COUNT: 16.9 % (ref 11.5–14.5)
EST. GFR  (AFRICAN AMERICAN): 5.1 ML/MIN/1.73 M^2
EST. GFR  (NON AFRICAN AMERICAN): 4.4 ML/MIN/1.73 M^2
GLUCOSE SERPL-MCNC: 81 MG/DL (ref 70–110)
HCT VFR BLD AUTO: 35.4 % (ref 40–54)
HDLC SERPL-MCNC: 56 MG/DL (ref 40–75)
HDLC SERPL: 50.5 % (ref 20–50)
HGB BLD-MCNC: 10.9 G/DL (ref 14–18)
IMM GRANULOCYTES # BLD AUTO: 0.03 K/UL (ref 0–0.04)
IMM GRANULOCYTES NFR BLD AUTO: 0.4 % (ref 0–0.5)
LDLC SERPL CALC-MCNC: 48.2 MG/DL (ref 63–159)
LYMPHOCYTES # BLD AUTO: 2 K/UL (ref 1–4.8)
LYMPHOCYTES NFR BLD: 24.1 % (ref 18–48)
MCH RBC QN AUTO: 31.3 PG (ref 27–31)
MCHC RBC AUTO-ENTMCNC: 30.8 G/DL (ref 32–36)
MCV RBC AUTO: 102 FL (ref 82–98)
MONOCYTES # BLD AUTO: 0.9 K/UL (ref 0.3–1)
MONOCYTES NFR BLD: 11.2 % (ref 4–15)
NEUTROPHILS # BLD AUTO: 4.9 K/UL (ref 1.8–7.7)
NEUTROPHILS NFR BLD: 58.3 % (ref 38–73)
NONHDLC SERPL-MCNC: 55 MG/DL
NRBC BLD-RTO: 0 /100 WBC
PLATELET # BLD AUTO: 384 K/UL (ref 150–350)
PMV BLD AUTO: 8.9 FL (ref 9.2–12.9)
POTASSIUM SERPL-SCNC: 4.4 MMOL/L (ref 3.5–5.1)
PROT SERPL-MCNC: 8.2 G/DL (ref 6–8.4)
RBC # BLD AUTO: 3.48 M/UL (ref 4.6–6.2)
SODIUM SERPL-SCNC: 141 MMOL/L (ref 136–145)
TRIGL SERPL-MCNC: 34 MG/DL (ref 30–150)
WBC # BLD AUTO: 8.42 K/UL (ref 3.9–12.7)

## 2020-01-02 PROCEDURE — 80061 LIPID PANEL: CPT | Mod: TXP

## 2020-01-02 PROCEDURE — 85025 COMPLETE CBC W/AUTO DIFF WBC: CPT | Mod: NTX

## 2020-01-02 PROCEDURE — 80053 COMPREHEN METABOLIC PANEL: CPT | Mod: TXP

## 2020-01-02 PROCEDURE — 36415 COLL VENOUS BLD VENIPUNCTURE: CPT | Mod: TXP

## 2020-01-16 ENCOUNTER — HOSPITAL ENCOUNTER (OUTPATIENT)
Dept: VASCULAR SURGERY | Facility: CLINIC | Age: 45
Discharge: HOME | End: 2020-01-16
Attending: SURGERY
Payer: MEDICARE

## 2020-01-16 ENCOUNTER — OFFICE VISIT (OUTPATIENT)
Dept: VASCULAR SURGERY | Facility: CLINIC | Age: 45
End: 2020-01-16
Attending: SURGERY
Payer: MEDICARE

## 2020-01-16 VITALS
HEIGHT: 68 IN | SYSTOLIC BLOOD PRESSURE: 156 MMHG | HEART RATE: 64 BPM | WEIGHT: 171.94 LBS | BODY MASS INDEX: 26.06 KG/M2 | DIASTOLIC BLOOD PRESSURE: 95 MMHG | TEMPERATURE: 99 F

## 2020-01-16 DIAGNOSIS — Z99.2 ESRD (END STAGE RENAL DISEASE) ON DIALYSIS: Primary | ICD-10-CM

## 2020-01-16 DIAGNOSIS — Z99.2 END STAGE RENAL FAILURE ON DIALYSIS: ICD-10-CM

## 2020-01-16 DIAGNOSIS — N18.6 END STAGE RENAL FAILURE ON DIALYSIS: ICD-10-CM

## 2020-01-16 DIAGNOSIS — N18.6 ESRD (END STAGE RENAL DISEASE) ON DIALYSIS: Primary | ICD-10-CM

## 2020-01-16 PROCEDURE — 99999 PR PBB SHADOW E&M-EST. PATIENT-LVL IV: ICD-10-PCS | Mod: PBBFAC,TXP,, | Performed by: SURGERY

## 2020-01-16 PROCEDURE — 99214 PR OFFICE/OUTPT VISIT, EST, LEVL IV, 30-39 MIN: ICD-10-PCS | Mod: S$PBB,NTX,, | Performed by: SURGERY

## 2020-01-16 PROCEDURE — 99214 OFFICE O/P EST MOD 30 MIN: CPT | Mod: S$PBB,NTX,, | Performed by: SURGERY

## 2020-01-16 PROCEDURE — 99999 PR PBB SHADOW E&M-EST. PATIENT-LVL IV: CPT | Mod: PBBFAC,TXP,, | Performed by: SURGERY

## 2020-01-16 PROCEDURE — 99214 OFFICE O/P EST MOD 30 MIN: CPT | Mod: PBBFAC,TXP | Performed by: SURGERY

## 2020-01-16 RX ORDER — ERGOCALCIFEROL 1.25 MG/1
CAPSULE ORAL
COMMUNITY
End: 2021-05-25

## 2020-01-16 RX ORDER — CLONIDINE HYDROCHLORIDE 0.1 MG/1
0.1 TABLET ORAL
COMMUNITY
End: 2020-02-06

## 2020-01-16 RX ORDER — CALCIUM ACETATE 667 MG/1
1334 CAPSULE ORAL
Status: ON HOLD | COMMUNITY
End: 2022-06-14

## 2020-01-16 RX ORDER — LISINOPRIL 10 MG/1
10 TABLET ORAL DAILY
Refills: 3 | COMMUNITY
Start: 2019-11-23 | End: 2020-02-06

## 2020-01-16 NOTE — PROGRESS NOTES
Haroldo Alok  01/16/2020    HPI:  Patient is a 44 y.o. male with a h/o HTN, ESRD with HD (MWF), DM who is here today for f/u of R RC AVF fistulagram in July 2019. He notes he has not had problems with HD.    Underwent fistulagram Feb 2019 for presumed AVF thrombosis. Upper arm cephalic occluded and outflow via basilic. Repeat fistulagram in July 2019 showed no significant stenosis. Renal failure due to HTN and is currently dialyzing M/W/F via the AVF. No problems with HD. Patient is right handed. Has been on dialysis since December 2013. He had a hypertensive stroke in December and has some residual left leg weakness. Walks with a walker.     Denies MI/ positive for stroke in 2013 (residual RLE weakness)  Tobacco use: denies    Past Medical History:   Diagnosis Date    CAD (coronary artery disease), native coronary artery 11/21/2019    Diabetes mellitus     Dialysis patient     DM type 2 causing renal disease, not at goal     ESRD (end stage renal disease) started dialysis 01/2014 6/5/2014    Hyperparathyroidism, secondary renal 6/5/2014    Hypertension     NSTEMI (non-ST elevated myocardial infarction) 12/21/2013    Organ transplant candidate 6/5/2014    Pneumonia     Renal hypertension     Stroke      Past Surgical History:   Procedure Laterality Date    COLONOSCOPY N/A 5/3/2017    Procedure: COLONOSCOPY;  Surgeon: Rufino Carpenter MD;  Location: [facility] ENDO ([address] [place] [address]);  Service: Endoscopy;  Laterality: N/A;  pt states can only schedule on Wednesdays    CORONARY STENT PLACEMENT N/A 7/25/2019    Procedure: INSERTION, STENT, CORONARY ARTERY;  Surgeon: Miki Zendejas MD;  Location: [facility] CATH LAB;  Service: Cardiology;  Laterality: N/A;    DIALYSIS FISTULA CREATION      FISTULOGRAM N/A 2/18/2019    Procedure: Fistulogram;  Surgeon: Yaneth De La Cruz MD;  Location: [facility] CATH LAB;  Service: Cardiology;  Laterality: N/A;    FISTULOGRAM Right 7/23/2019    Procedure: Fistulogram;  Surgeon: Oz Cordoba,  MD;  Location: [facility] CATH LAB;  Service: Cardiology;  Laterality: Right;    LEFT HEART CATHETERIZATION Left 7/25/2019    Procedure: Left heart cath;  Surgeon: Miki Zendejas MD;  Location: [facility] CATH LAB;  Service: Cardiology;  Laterality: Left;    VASCULAR SURGERY       Family History   Problem Relation Age of Onset    Hypertension Mother     Diabetes Mother     Hypertension Father     Hypertension Sister     Kidney disease Brother     Hypertension Brother     Heart disease Brother     Cancer Neg Hx     Colon cancer Neg Hx     Esophageal cancer Neg Hx     Stomach cancer Neg Hx     Rectal cancer Neg Hx     Ulcerative colitis Neg Hx     Irritable bowel syndrome Neg Hx     Crohn's disease Neg Hx     Celiac disease Neg Hx      Social History     Socioeconomic History    Marital status: Single     Spouse name: Not on file    Number of children: Not on file    Years of education: Not on file    Highest education level: Not on file   Occupational History     Employer: [employer]   Social Needs    Financial resource strain: Not on file    Food insecurity:     Worry: Not on file     Inability: Not on file    Transportation needs:     Medical: Not on file     Non-medical: Not on file   Tobacco Use    Smoking status: Never Smoker    Smokeless tobacco: Never Used   Substance and Sexual Activity    Alcohol use: No    Drug use: No    Sexual activity: Yes     Partners: Female     Birth control/protection: None   Lifestyle    Physical activity:     Days per week: Not on file     Minutes per session: Not on file    Stress: Not on file   Relationships    Social connections:     Talks on phone: Not on file     Gets together: Not on file     Attends Denominational service: Not on file     Active member of club or organization: Not on file     Attends meetings of clubs or organizations: Not on file     Relationship status: Not on file   Other Topics Concern    Not on file   Social History Narrative     Disabled     for one year    One son age 11    History of blood transfusion       Current Outpatient Medications:     amLODIPine (NORVASC) 10 MG tablet, Take 1 tablet (10 mg total) by mouth once daily., Disp: 30 tablet, Rfl: 3    aspirin 81 MG Chew, Take 1 tablet (81 mg total) by mouth once daily., Disp: 90 tablet, Rfl: 0    atorvastatin (LIPITOR) 40 MG tablet, Take 1 tablet (40 mg total) by mouth once daily., Disp: 90 tablet, Rfl: 3    AURYXIA 210 mg iron Tab, , Disp: , Rfl:     clopidogrel (PLAVIX) 75 mg tablet, Take 1 tablet (75 mg total) by mouth once daily., Disp: 30 tablet, Rfl: 5    dorzolamide (TRUSOPT) 2 % ophthalmic solution, 1 drop 3 (three) times daily. , Disp: , Rfl:     fenofibrate 160 MG Tab, Take 160 mg by mouth once daily., Disp: , Rfl: 0    losartan (COZAAR) 25 MG tablet, Take 1 tablet (25 mg total) by mouth once daily., Disp: 90 tablet, Rfl: 3    metoprolol succinate (TOPROL XL) 100 MG 24 hr tablet, Toprol  mg tablet,extended release  Take 1 tablet every day by oral route., Disp: , Rfl:     PRORENAL 8 mg iron-800 mcg-1,000 unit Tab, Take 1 tablet by mouth once daily., Disp: , Rfl: 11    RENVELA 800 mg Tab, 800 mg 3 (three) times daily with meals. , Disp: , Rfl:     SENSIPAR 30 mg Tab, Take 30 mg by mouth before dinner., Disp: , Rfl: 11    tadalafil (CIALIS) 20 MG Tab, Cialis 20 mg tablet  Take 1 pill by oral route every 3 days, Disp: , Rfl:   No current facility-administered medications for this visit.     Facility-Administered Medications Ordered in Other Visits:     0.9%  NaCl infusion, , Intravenous, Continuous, Michelle Spence MD    ceFAZolin injection 2 g, 2 g, Intravenous, On Call Procedure, Michelle Spence MD    lidocaine (PF) 10 mg/ml (1%) injection 10 mg, 1 mL, Intradermal, Once, Michelle Spence MD    REVIEW OF SYSTEMS:  General: negative; ENT: negative; Allergy and Immunology: negative; Hematological and Lymphatic: Negative; Endocrine:  negative; Respiratory: no cough, shortness of breath, or wheezing; Cardiovascular: no chest pain or dyspnea on exertion; Gastrointestinal: no abdominal pain/back, change in bowel habits, or bloody stools; Genito-Urinary: no dysuria, trouble voiding, or hematuria; Musculoskeletal: negative  Neurological: no TIA or stroke symptoms; Psychiatric: no nervousness, anxiety or depression.    PHYSICAL EXAM:                General appearance:  Alert, well-appearing, and in no distress.  Oriented to person, place, and time   Neurological: Normal speech, no focal findings noted; CN II - XII grossly intact           Musculoskeletal: Digits/nail without cyanosis/clubbing.  Normal muscle strength/tone.                 Neck: Supple, no significant adenopathy; thyroid is not enlarged                  No carotid bruit can be auscultated                Chest:  Clear to auscultation, no wheezes, rales or rhonchi, symmetric air entry     No use of accessory muscles             Cardiac: Normal rate and regular rhythm, S1 and S2 normal; PMI non-displaced          Abdomen: Soft, nontender, nondistended, no masses or organomegaly     No rebound tenderness noted; bowel sounds normal     Pulsatile aortic mass is not palpable.     No groin adenopathy      Extremities:   2+ femoral pulses bilaterally     2+ pedal pulses palpable.     No pre-tibial edema     No ulcerations    LAB RESULTS:  Lab Results   Component Value Date    K 4.4 01/02/2020    K 4.2 07/25/2019    K 4.3 07/09/2019    CREATININE 12.4 (H) 01/02/2020    CREATININE 10.7 (H) 07/25/2019    CREATININE 11.8 (H) 07/09/2019     Lab Results   Component Value Date    WBC 8.42 01/02/2020    WBC 7.36 07/25/2019    WBC 7.14 07/09/2019    HCT 35.4 (L) 01/02/2020    HCT 33.8 (L) 07/25/2019    HCT 34.1 (L) 07/09/2019     (H) 01/02/2020     (H) 07/25/2019     (H) 07/09/2019     Lab Results   Component Value Date    HGBA1C 4.4 (L) 06/05/2014    HGBA1C 5.9 12/16/2013      IMAGING:  EXAM DUSTY: 12/05/2019 09:34  REF PHYS: OZ BEAL      Indication:  ESRD.  Results:  Dialysis Access: Right Arm Radiocephalic fistula                    PSV [cm/s]        Flow Volume [ml/min]  ________________________________________________________________  Proximal to Anastomosis (artery)    658               -  Anastomosis       706               -  Proximal AVF      75                -  Middle AVF        60                699  Distal AVF        99                -    Rt. ACF= 146 cm/sec.    Report Summary:  Impression:   Duplex ultrasound of the right Radiocephalic fistula reveals accelerated velocities at the inflow ( 658 cm/sec) and anastomosis ( 706 cm/sec). A visual narrowing can only be visualized at the inflow artery, which suggest a focal hemodynamically   significant stenosis. Thrombus along the vessel wall is noted at the prox/mid and mid segment. The volume flow is 699 mL/min.    IMP/PLAN:  44 y.o. male with ESRD, HTN, DM with R RC AVF originally placed in 2013. He then had fistulagram in February 2019 that showed patent AVF, but R cephalic v. occlusion with outflow via the basilic system. He had another fistulagram in July 2019 that showed no significant stenosis. He returns today for follow up evaluation. HD progressing without incident.    1) continue asa, statin  2) RTC w duplex in 1 month or sooner if issues arise.     zO Beal MD FACS  Vascular/Endovascular Surgery

## 2020-02-06 ENCOUNTER — HOSPITAL ENCOUNTER (OUTPATIENT)
Dept: RADIOLOGY | Facility: HOSPITAL | Age: 45
Discharge: HOME | End: 2020-02-06
Attending: NURSE PRACTITIONER
Payer: MEDICARE

## 2020-02-06 ENCOUNTER — OFFICE VISIT (OUTPATIENT)
Dept: TRANSPLANT | Facility: CLINIC | Age: 45
End: 2020-02-06
Payer: MEDICARE

## 2020-02-06 VITALS
HEIGHT: 67 IN | BODY MASS INDEX: 27.27 KG/M2 | RESPIRATION RATE: 16 BRPM | WEIGHT: 173.75 LBS | HEART RATE: 64 BPM | TEMPERATURE: 98 F | OXYGEN SATURATION: 100 %

## 2020-02-06 DIAGNOSIS — E11.22 CONTROLLED TYPE 2 DIABETES MELLITUS WITH CHRONIC KIDNEY DISEASE ON CHRONIC DIALYSIS, WITHOUT LONG-TERM CURRENT USE OF INSULIN: Chronic | ICD-10-CM

## 2020-02-06 DIAGNOSIS — Z76.82 PATIENT ON WAITING LIST FOR KIDNEY TRANSPLANT: Chronic | ICD-10-CM

## 2020-02-06 DIAGNOSIS — N18.6 CONTROLLED TYPE 2 DIABETES MELLITUS WITH CHRONIC KIDNEY DISEASE ON CHRONIC DIALYSIS, WITHOUT LONG-TERM CURRENT USE OF INSULIN: Chronic | ICD-10-CM

## 2020-02-06 DIAGNOSIS — Z76.82 ORGAN TRANSPLANT CANDIDATE: ICD-10-CM

## 2020-02-06 DIAGNOSIS — Z99.2 CONTROLLED TYPE 2 DIABETES MELLITUS WITH CHRONIC KIDNEY DISEASE ON CHRONIC DIALYSIS, WITHOUT LONG-TERM CURRENT USE OF INSULIN: Chronic | ICD-10-CM

## 2020-02-06 DIAGNOSIS — I69.351 HEMIPARESIS AFFECTING RIGHT SIDE AS LATE EFFECT OF CEREBROVASCULAR ACCIDENT: ICD-10-CM

## 2020-02-06 DIAGNOSIS — I25.10 CORONARY ARTERY DISEASE INVOLVING NATIVE CORONARY ARTERY OF NATIVE HEART WITHOUT ANGINA PECTORIS: ICD-10-CM

## 2020-02-06 DIAGNOSIS — N18.6 END STAGE RENAL FAILURE ON DIALYSIS: Primary | Chronic | ICD-10-CM

## 2020-02-06 DIAGNOSIS — I12.9 RENAL HYPERTENSION: Chronic | ICD-10-CM

## 2020-02-06 DIAGNOSIS — Z99.2 END STAGE RENAL FAILURE ON DIALYSIS: Primary | Chronic | ICD-10-CM

## 2020-02-06 PROCEDURE — 76770 US EXAM ABDO BACK WALL COMP: CPT | Mod: 26,TXP,, | Performed by: RADIOLOGY

## 2020-02-06 PROCEDURE — 99999 PR PBB SHADOW E&M-EST. PATIENT-LVL III: ICD-10-PCS | Mod: PBBFAC,TXP,, | Performed by: NURSE PRACTITIONER

## 2020-02-06 PROCEDURE — 71046 X-RAY EXAM CHEST 2 VIEWS: CPT | Mod: TC,TXP

## 2020-02-06 PROCEDURE — 99215 PR OFFICE/OUTPT VISIT, EST, LEVL V, 40-54 MIN: ICD-10-PCS | Mod: S$PBB,TXP,, | Performed by: NURSE PRACTITIONER

## 2020-02-06 PROCEDURE — 76770 US RETROPERITONEAL COMPLETE: ICD-10-PCS | Mod: 26,TXP,, | Performed by: RADIOLOGY

## 2020-02-06 PROCEDURE — 72170 X-RAY EXAM OF PELVIS: CPT | Mod: 26,TXP,, | Performed by: SPECIALIST

## 2020-02-06 PROCEDURE — 72170 XR PELVIS ROUTINE AP: ICD-10-PCS | Mod: 26,TXP,, | Performed by: SPECIALIST

## 2020-02-06 PROCEDURE — 72170 X-RAY EXAM OF PELVIS: CPT | Mod: TC,TXP

## 2020-02-06 PROCEDURE — 99999 PR PBB SHADOW E&M-EST. PATIENT-LVL III: CPT | Mod: PBBFAC,TXP,, | Performed by: NURSE PRACTITIONER

## 2020-02-06 PROCEDURE — 76770 US EXAM ABDO BACK WALL COMP: CPT | Mod: TC,TXP

## 2020-02-06 PROCEDURE — 71046 XR CHEST PA AND LATERAL: ICD-10-PCS | Mod: 26,TXP,, | Performed by: RADIOLOGY

## 2020-02-06 PROCEDURE — 99215 OFFICE O/P EST HI 40 MIN: CPT | Mod: S$PBB,TXP,, | Performed by: NURSE PRACTITIONER

## 2020-02-06 PROCEDURE — 71046 X-RAY EXAM CHEST 2 VIEWS: CPT | Mod: 26,TXP,, | Performed by: RADIOLOGY

## 2020-02-06 PROCEDURE — 99213 OFFICE O/P EST LOW 20 MIN: CPT | Mod: PBBFAC,25,TXP | Performed by: NURSE PRACTITIONER

## 2020-02-06 NOTE — PROGRESS NOTES
Patient left before SW could see patient for assessment. Patient will need to be rescheduled for a social work only appointment. Message sent to nurse coordinator.

## 2020-02-06 NOTE — PROGRESS NOTES
Kidney Transplant Recipient Reevalulation    Referring Physician: Kristy Pastrana  Current Nephrologist: Kristy Pastrana  Waitlist Status: inactive  Dialysis Start Date: 6/23/2017    Subjective:     CC:  Annual reassessment of kidney transplant candidacy.    HPI:  Mr. Dickinson is a 44 y.o. year old Black or  male with ESRD secondary to diabetic nephropathy.  He has been on the wait list for a kidney transplant at [facility] since 12/16/2013. Patient is currently on hemodialysis started on 6/23/2017. Patient is dialyzing on MWF schedule.  Patient reports that he is tolerating dialysis well.. He has a RUE AV fistula. He is dialyzing for 4 hours per session.  Patient denies any recent hospitalizations or ED visits.      PT WAS LAST SEEN IN LISTED TXP CLINIC ON 2/14/2017  Per letter, pt is currently inactive since 8/16/2017 for lack of caregiver plan. While inactive, he underwent  cardiac stent placement ON 7/26/2019 and is on Plavix. Waiting on cardiology f/u and clearance      PMH CVA with residual Right sided Weakness  fx assessment   Walks with a cane for support. Is able to move around without problems. Lives on the 2nd story and will go up and down stairs without OSEGUERA. Tries to walk but has not walked as much due to the weather. Does not appear frail.        2/6/2020 Renal US  Impression     Significant enlargement of the kidneys in comparison the prior exam related to progression of cystic lesions within the kidneys.  Bilateral nonobstructive nephrolithiasis with 4-6 renal stones seen bilaterally.     2/6/2020 CXR  Impression     Normal chest radiograph.     2/6/2020 PXR   Impression     Chronic changes are noted, there is no evidence for acute fracture or dislocation, close clinical and historical correlation is needed to determine need for additional follow-up.       Past Medical History:   Diagnosis Date    CAD (coronary artery disease), native coronary artery 11/21/2019    Diabetes mellitus      "Dialysis patient     DM type 2 causing renal disease, not at goal     ESRD (end stage renal disease) started dialysis 01/2014 6/5/2014    Hyperparathyroidism, secondary renal 6/5/2014    Hypertension     NSTEMI (non-ST elevated myocardial infarction) 12/21/2013    Organ transplant candidate 6/5/2014    Pneumonia     Renal hypertension     Stroke        Review of Systems   Constitutional: Negative for activity change, appetite change, chills, fatigue, fever and unexpected weight change.   HENT: Negative for congestion, facial swelling, postnasal drip, rhinorrhea, sinus pressure, sore throat and trouble swallowing.    Eyes: Negative for pain, redness and visual disturbance.   Respiratory: Negative for cough, chest tightness, shortness of breath and wheezing.    Cardiovascular: Negative.  Negative for chest pain, palpitations and leg swelling.   Gastrointestinal: Negative for abdominal pain, diarrhea, nausea and vomiting.   Genitourinary: Negative for dysuria, flank pain and urgency.   Musculoskeletal: Negative for gait problem, neck pain and neck stiffness.        Uses a cane    Skin: Negative for rash.   Allergic/Immunologic: Negative for environmental allergies, food allergies and immunocompromised state.   Neurological: Negative for dizziness, weakness, light-headedness and headaches.        Right sided weakness   Psychiatric/Behavioral: Positive for sleep disturbance. Negative for agitation and confusion. The patient is not nervous/anxious.        Objective:   body mass index is 27.14 kg/m².  Pulse 64   Temp 98 °F (36.7 °C) (Oral)   Resp 16   Ht 5' 7.09" (1.704 m)   Wt 78.8 kg (173 lb 11.6 oz)   SpO2 100%   BMI 27.14 kg/m²     Physical Exam   Constitutional: He is oriented to person, place, and time. He appears well-developed and well-nourished.   HENT:   Head: Normocephalic.   Mouth/Throat: No oropharyngeal exudate.   Eyes: Pupils are equal, round, and reactive to light. EOM are normal. No scleral " icterus.   Neck: Normal range of motion. Neck supple.   Cardiovascular: Normal rate, regular rhythm and normal heart sounds.   Pulmonary/Chest: Effort normal and breath sounds normal.   Abdominal: Soft. Normal appearance and bowel sounds are normal. He exhibits no distension and no mass. There is no tenderness. There is no CVA tenderness.   Musculoskeletal: Normal range of motion. He exhibits no edema.        Arms:  Neurological: He is alert and oriented to person, place, and time. He exhibits normal muscle tone. Coordination normal.   Skin: Skin is warm and dry.   Psychiatric: He has a normal mood and affect. His behavior is normal.   Vitals reviewed.      Labs:  Lab Results   Component Value Date    WBC 8.42 01/02/2020    HGB 10.9 (L) 01/02/2020    HCT 35.4 (L) 01/02/2020     01/02/2020    K 4.4 01/02/2020     01/02/2020    CO2 29 01/02/2020    BUN 63 (H) 01/02/2020    CREATININE 12.4 (H) 01/02/2020    EGFRNONAA 4.4 (A) 01/02/2020    CALCIUM 8.7 01/02/2020    PHOS 8.4 (H) 01/07/2014    MG 2.4 01/07/2014    ALBUMIN 3.5 01/02/2020    AST 57 (H) 01/02/2020    ALT 83 (H) 01/02/2020    UTPCR 3.2 (H) 12/15/2013    .0 (H) 02/14/2017       Lab Results   Component Value Date    BILIRUBINUA Negative 12/15/2013    PROTEINUA 3+ (A) 12/15/2013    NITRITE Negative 12/15/2013    RBCUA 2 12/15/2013    WBCUA 4 12/15/2013       No results found for: HLAABCTYPE    Lab Results   Component Value Date    CPRA 37 12/04/2019    EZ1VAOE B75,B78 12/04/2019    CIABCLM B51, -- WEAK B46, B27 12/04/2019    CIIAB DQ2,DQA1*05:01 12/04/2019    ABCMT DQ2, DR51, DQB1*06:03, DP5 12/04/2019       Labs were reviewed with the patient.    Pre-transplant Workup:   Reviewed with the patient.    Assessment:     1. End stage renal failure on dialysis    2. Patient on waiting list for kidney transplant    3. Controlled type 2 diabetes mellitus with chronic kidney disease on chronic dialysis, without long-term current use of insulin    4.  Coronary artery disease involving native coronary artery of native heart without angina pectoris    5. Renal hypertension    6. Hemiparesis affecting right side as late effect of cerebrovascular accident        Plan:   discussed with the patient. Waiting on UTD cardiology clearance, CAD, S/P CARDIAC STENTS, ON PLAVIX  COLONOSCOPY DUE 5/2020  NEEDS SW CLEARANCE     Transplant Candidacy:   Mr. Dickinson is an unacceptable kidney transplant candidate.  Meets center eligibility for accepting HCV+ donor offer - yes.  Patient educated on HCV+ donors. [facility] is willing  to accept HCV+ donor offer -  yes   Patient is a candidate for KDPI > 85 kidney donor offer - no ON PLAVIX and weight.  He will  REMAIN  inactive status at present due to  Waiting on UTD cardiology clearance, CAD, S/P CARDIAC STENTS, ON PLAVIX AND NEEDS SW CLEARANCE       Cordelia Singer NP       Follow-up:   In addition to the tests noted in the plan, Mr. Dickinson will continue to have reevaluation as per the standing pre-kidney transplant protocol:  1. Monthly blood for PRA  2. Annual return to clinic, except HIV positive, > 65 years of age, or pancreas transplant candidates who will be scheduled to see transplant every 6 months while in pre-transplant phase  3. Annual re-testing: CXR, EKG, yearly mammograms for women over 40 and PSA for males over 40, cardiology follow-up as recommended by initial cardiology pre-transplant evaluation  4. Renal ultrasound every 2 years  5. Baseline colonoscopy after age 50 and repeated as recommended    UNOS Patient Status  Functional Status: 60% - Requires occasional assistance but is able to care for needs  Physical Capacity: No Limitations

## 2020-02-06 NOTE — PROGRESS NOTES
PHARM.D. PRE-TRANSPLANT NOTE:    This patient's medication therapy was evaluated as part of his pre-transplant evaluation.      The following general pharmacologic concerns were noted:   -aspirin and plavix- increased risk of bleeding post-op    The following pharmacologic concerns related to HCV therapy were noted:   -Atorvastatin levels increased by HCV meds- coadministration not recommended      This patient's medication profile was reviewed for considerations for DAA Hepatitis C therapy:    [x]  No current inducers of CYP 3A4 or PGP  [x]  No amiodarone on this patient's EMR profile in the last 24 months  [x]  No past or current atrial fibrillation on this patient's EMR profile       Current Outpatient Medications   Medication Sig Dispense Refill    amLODIPine (NORVASC) 10 MG tablet Take 1 tablet (10 mg total) by mouth once daily. 30 tablet 3    atorvastatin (LIPITOR) 40 MG tablet Take 1 tablet (40 mg total) by mouth once daily. 90 tablet 3    AURYXIA 210 mg iron Tab       calcium acetate (PHOSLO) 667 mg capsule Take 1,334 mg by mouth.      clopidogrel (PLAVIX) 75 mg tablet Take 1 tablet (75 mg total) by mouth once daily. 30 tablet 5    ergocalciferol (ERGOCALCIFEROL) 50,000 unit Cap Vitamin D2 1,250 mcg (50,000 unit) capsule   Take 1 capsule every month by oral route.      fenofibrate 160 MG Tab Take 160 mg by mouth once daily.  0    metoprolol succinate (TOPROL XL) 100 MG 24 hr tablet Toprol  mg tablet,extended release   Take 1 tablet every day by oral route.      tadalafil (CIALIS) 20 MG Tab Cialis 20 mg tablet   Take 1 pill by oral route every 3 days      aspirin 81 MG Chew Take 1 tablet (81 mg total) by mouth once daily. (Patient not taking: Reported on 2/6/2020) 90 tablet 0     No current facility-administered medications for this visit.      Facility-Administered Medications Ordered in Other Visits   Medication Dose Route Frequency Provider Last Rate Last Dose    0.9%  NaCl infusion    Intravenous Continuous Michelle Spence MD        ceFAZolin injection 2 g  2 g Intravenous On Call Procedure Michelle Spence MD        lidocaine (PF) 10 mg/ml (1%) injection 10 mg  1 mL Intradermal Once Michelle Spnece MD             Currently he is responsible for preparing / administering this patient's medications on a daily basis.  I am available for consultation and can be contacted, as needed by the other members of the Kidney Transplant team.

## 2020-02-06 NOTE — PATIENT INSTRUCTIONS
Waiting on up to date  cardiology clearance, CAD, S/P CARDIAC STENTS, ON PLAVIX AND NEEDS SW CLEARANCE

## 2020-02-07 ENCOUNTER — TELEPHONE (OUTPATIENT)
Dept: CARDIOLOGY | Facility: HOSPITAL | Age: 45
End: 2020-02-07

## 2020-02-07 NOTE — TELEPHONE ENCOUNTER
I attempted to contact patient and wife x 2 about normal LDL and HDL values and stable creatnine (patient on dialysis) on labs. They did not  and I could not leave . Will try again this PM

## 2020-02-10 ENCOUNTER — TELEPHONE (OUTPATIENT)
Dept: NEPHROLOGY | Facility: CLINIC | Age: 45
End: 2020-02-10

## 2020-02-10 NOTE — TELEPHONE ENCOUNTER
It looks like from  last cards visit, he can possibly come off plavix in 2/20.  I am ccing cards as well to see if he is close to being cleared for kidney transplant.  Thanks.

## 2020-02-11 DIAGNOSIS — Z76.82 ORGAN TRANSPLANT CANDIDATE: Primary | ICD-10-CM

## 2020-02-12 DIAGNOSIS — Z76.82 ORGAN TRANSPLANT CANDIDATE: Primary | ICD-10-CM

## 2020-02-13 ENCOUNTER — TELEPHONE (OUTPATIENT)
Dept: TRANSPLANT | Facility: CLINIC | Age: 45
End: 2020-02-13

## 2020-02-13 DIAGNOSIS — Z76.82 ORGAN TRANSPLANT CANDIDATE: Primary | ICD-10-CM

## 2020-03-04 DIAGNOSIS — Z99.2 END STAGE RENAL FAILURE ON DIALYSIS: Primary | ICD-10-CM

## 2020-03-04 DIAGNOSIS — N18.6 END STAGE RENAL FAILURE ON DIALYSIS: Primary | ICD-10-CM

## 2020-03-05 ENCOUNTER — HOSPITAL ENCOUNTER (OUTPATIENT)
Dept: CARDIOLOGY | Facility: CLINIC | Age: 45
Discharge: HOME | End: 2020-03-05
Attending: NURSE PRACTITIONER
Payer: MEDICARE

## 2020-03-05 VITALS
HEART RATE: 68 BPM | BODY MASS INDEX: 27.15 KG/M2 | DIASTOLIC BLOOD PRESSURE: 90 MMHG | WEIGHT: 173 LBS | HEIGHT: 67 IN | SYSTOLIC BLOOD PRESSURE: 150 MMHG

## 2020-03-05 DIAGNOSIS — Z76.82 ORGAN TRANSPLANT CANDIDATE: ICD-10-CM

## 2020-03-05 LAB
ASCENDING AORTA: 3.24 CM
AV INDEX (PROSTH): 0.65
AV MEAN GRADIENT: 6 MMHG
AV PEAK GRADIENT: 12 MMHG
AV VALVE AREA: 2.34 CM2
AV VELOCITY RATIO: 0.66
BSA FOR ECHO PROCEDURE: 1.93 M2
CV ECHO LV RWT: 0.5 CM
DOP CALC AO PEAK VEL: 1.71 M/S
DOP CALC AO VTI: 39.41 CM
DOP CALC LVOT AREA: 3.6 CM2
DOP CALC LVOT DIAMETER: 2.14 CM
DOP CALC LVOT PEAK VEL: 1.13 M/S
DOP CALC LVOT STROKE VOLUME: 92.03 CM3
DOP CALCLVOT PEAK VEL VTI: 25.6 CM
E WAVE DECELERATION TIME: 167.3 MSEC
E/A RATIO: 1.4
E/E' RATIO: 10.57 M/S
ECHO LV POSTERIOR WALL: 1.27 CM (ref 0.6–1.1)
FRACTIONAL SHORTENING: 33 % (ref 28–44)
INTERVENTRICULAR SEPTUM: 1.33 CM (ref 0.6–1.1)
LA MAJOR: 5.34 CM
LA MINOR: 5.01 CM
LA WIDTH: 4.47 CM
LEFT ATRIUM SIZE: 4.13 CM
LEFT ATRIUM VOLUME INDEX: 42.7 ML/M2
LEFT ATRIUM VOLUME: 81.12 CM3
LEFT INTERNAL DIMENSION IN SYSTOLE: 3.41 CM (ref 2.1–4)
LEFT VENTRICLE DIASTOLIC VOLUME INDEX: 64.37 ML/M2
LEFT VENTRICLE DIASTOLIC VOLUME: 122.38 ML
LEFT VENTRICLE MASS INDEX: 141 G/M2
LEFT VENTRICLE SYSTOLIC VOLUME INDEX: 25.1 ML/M2
LEFT VENTRICLE SYSTOLIC VOLUME: 47.78 ML
LEFT VENTRICULAR INTERNAL DIMENSION IN DIASTOLE: 5.07 CM (ref 3.5–6)
LEFT VENTRICULAR MASS: 267.58 G
LV LATERAL E/E' RATIO: 9.25 M/S
LV SEPTAL E/E' RATIO: 12.33 M/S
MV PEAK A VEL: 0.53 M/S
MV PEAK E VEL: 0.74 M/S
PISA TR MAX VEL: 1.94 M/S
PULM VEIN S/D RATIO: 0.62
PV PEAK D VEL: 0.6 M/S
PV PEAK S VEL: 0.37 M/S
RA MAJOR: 4.7 CM
RA PRESSURE: 3 MMHG
RA WIDTH: 3.52 CM
RIGHT VENTRICULAR END-DIASTOLIC DIMENSION: 3.72 CM
RV TISSUE DOPPLER FREE WALL SYSTOLIC VELOCITY 1 (APICAL 4 CHAMBER VIEW): 15.37 CM/S
SINUS: 3.31 CM
STJ: 2.69 CM
TDI LATERAL: 0.08 M/S
TDI SEPTAL: 0.06 M/S
TDI: 0.07 M/S
TR MAX PG: 15 MMHG
TRICUSPID ANNULAR PLANE SYSTOLIC EXCURSION: 2.36 CM
TV REST PULMONARY ARTERY PRESSURE: 18 MMHG

## 2020-03-05 PROCEDURE — 93306 TTE W/DOPPLER COMPLETE: CPT | Mod: PBBFAC,TXP | Performed by: INTERNAL MEDICINE

## 2020-03-05 PROCEDURE — 93306 ECHO (CUPID ONLY): ICD-10-PCS | Mod: 26,S$PBB,TXP, | Performed by: INTERNAL MEDICINE

## 2020-04-06 ENCOUNTER — TELEPHONE (OUTPATIENT)
Dept: TRANSPLANT | Facility: CLINIC | Age: 45
End: 2020-04-06

## 2020-04-06 NOTE — TELEPHONE ENCOUNTER
----- Message from Autumn Nolasco LCSW sent at 4/6/2020  1:41 PM CDT -----  Regarding: RE: needed SW only visit  My team mates may have differing opinions but after a quick review of this patient's chart, his sw visit must be done in person with a caregiver present.  He has had lots of psychosocial issues.      Lidia Bazzi Nicole, Joan, what do you think?    ----- Message -----  From: Miguelina Walker  Sent: 4/6/2020   1:23 PM CDT  To: [facility] Kidney Transplant Social Workers  Subject: needed SW only visit                             Good Afternoon,    Patient was seen in waitlist clinic on 2/6/20 and left before SW could see him and his caregiver. He was rescheduled for SW only visit on 3/19 but we cancelled it due to covid concerns. Can his SW be called over the phone for an assessment? Thanks     Miguelina

## 2020-04-07 DIAGNOSIS — Z76.82 ORGAN TRANSPLANT CANDIDATE: Primary | ICD-10-CM

## 2020-04-29 ENCOUNTER — TELEPHONE (OUTPATIENT)
Dept: TRANSPLANT | Facility: CLINIC | Age: 45
End: 2020-04-29

## 2020-04-30 ENCOUNTER — HOSPITAL ENCOUNTER (OUTPATIENT)
Dept: VASCULAR SURGERY | Facility: CLINIC | Age: 45
Discharge: HOME | End: 2020-04-30
Attending: SURGERY
Payer: MEDICARE

## 2020-04-30 ENCOUNTER — OFFICE VISIT (OUTPATIENT)
Dept: VASCULAR SURGERY | Facility: CLINIC | Age: 45
End: 2020-04-30
Attending: SURGERY
Payer: MEDICARE

## 2020-04-30 ENCOUNTER — DOCUMENTATION ONLY (OUTPATIENT)
Dept: VASCULAR SURGERY | Facility: CLINIC | Age: 45
End: 2020-04-30

## 2020-04-30 VITALS
HEART RATE: 65 BPM | SYSTOLIC BLOOD PRESSURE: 103 MMHG | RESPIRATION RATE: 18 BRPM | WEIGHT: 175.06 LBS | BODY MASS INDEX: 26.53 KG/M2 | DIASTOLIC BLOOD PRESSURE: 68 MMHG | HEIGHT: 68 IN | TEMPERATURE: 98 F

## 2020-04-30 DIAGNOSIS — Z99.2 END STAGE RENAL FAILURE ON DIALYSIS: ICD-10-CM

## 2020-04-30 DIAGNOSIS — N18.6 END STAGE RENAL FAILURE ON DIALYSIS: ICD-10-CM

## 2020-04-30 DIAGNOSIS — N18.6 ESRD (END STAGE RENAL DISEASE): Primary | ICD-10-CM

## 2020-04-30 DIAGNOSIS — Z99.2 END STAGE RENAL FAILURE ON DIALYSIS: Primary | Chronic | ICD-10-CM

## 2020-04-30 DIAGNOSIS — N18.6 END STAGE RENAL FAILURE ON DIALYSIS: Primary | Chronic | ICD-10-CM

## 2020-04-30 DIAGNOSIS — Z01.818 PRE-OP EVALUATION: Primary | ICD-10-CM

## 2020-04-30 PROCEDURE — 99214 OFFICE O/P EST MOD 30 MIN: CPT | Mod: PBBFAC,25,TXP | Performed by: SURGERY

## 2020-04-30 PROCEDURE — 93990 DOPPLER FLOW TESTING: CPT | Mod: PBBFAC,TXP | Performed by: SURGERY

## 2020-04-30 PROCEDURE — 93990 DOPPLER FLOW TESTING: CPT | Mod: 26,S$PBB,TXP, | Performed by: SURGERY

## 2020-04-30 PROCEDURE — 99999 PR PBB SHADOW E&M-EST. PATIENT-LVL IV: CPT | Mod: PBBFAC,TXP,, | Performed by: SURGERY

## 2020-04-30 PROCEDURE — 99214 PR OFFICE/OUTPT VISIT, EST, LEVL IV, 30-39 MIN: ICD-10-PCS | Mod: S$PBB,NTX,, | Performed by: SURGERY

## 2020-04-30 PROCEDURE — 99999 PR PBB SHADOW E&M-EST. PATIENT-LVL IV: ICD-10-PCS | Mod: PBBFAC,TXP,, | Performed by: SURGERY

## 2020-04-30 PROCEDURE — 93990 PR DUPLEX HEMODIALYSIS ACCESS: ICD-10-PCS | Mod: 26,S$PBB,TXP, | Performed by: SURGERY

## 2020-04-30 PROCEDURE — 99214 OFFICE O/P EST MOD 30 MIN: CPT | Mod: S$PBB,NTX,, | Performed by: SURGERY

## 2020-04-30 RX ORDER — MUPIROCIN 20 MG/G
OINTMENT TOPICAL
Status: CANCELLED | OUTPATIENT
Start: 2020-04-30

## 2020-04-30 RX ORDER — SODIUM CHLORIDE 0.9 % (FLUSH) 0.9 %
10 SYRINGE (ML) INJECTION
Status: DISCONTINUED | OUTPATIENT
Start: 2020-04-30 | End: 2021-07-01 | Stop reason: HOSPADM

## 2020-04-30 RX ORDER — LIDOCAINE HYDROCHLORIDE 10 MG/ML
1 INJECTION, SOLUTION EPIDURAL; INFILTRATION; INTRACAUDAL; PERINEURAL ONCE
Status: CANCELLED | OUTPATIENT
Start: 2020-04-30 | End: 2020-04-30

## 2020-04-30 NOTE — PROGRESS NOTES
Haroldo Alok  04/30/2020    HPI:  Patient is a 44 y.o. male with a h/o HTN, ESRD with HD (MWF), DM who is here today because is HD center is reporting poor clearance with his R RC AVF. No prolonged bleeding, no high venous pressures.    July 2019 fistulogram showed no significant stenosis.   Feb 2019 fistulogram for presumed AVF thrombosis. Upper arm cephalic occluded and outflow via basilic.     Renal failure due to HTN and is currently dialyzing M/W/F via the AVF. No problems with HD. Patient is right handed. Has been on dialysis since December 2013. He had a hypertensive stroke in December and has some residual left leg weakness. Walks with a walker.     Denies MI/ positive for stroke in 2013 (residual RLE weakness)  Tobacco use: denies    Past Medical History:   Diagnosis Date    CAD (coronary artery disease), native coronary artery 11/21/2019    Diabetes mellitus     Dialysis patient     DM type 2 causing renal disease, not at goal     ESRD (end stage renal disease) started dialysis 01/2014 6/5/2014    Hyperparathyroidism, secondary renal 6/5/2014    Hypertension     NSTEMI (non-ST elevated myocardial infarction) 12/21/2013    Organ transplant candidate 6/5/2014    Pneumonia     Renal hypertension     Stroke      Past Surgical History:   Procedure Laterality Date    COLONOSCOPY N/A 5/3/2017    Procedure: COLONOSCOPY;  Surgeon: Rufino Carpenter MD;  Location: [facility] ENDO ([address] [place] [address]);  Service: Endoscopy;  Laterality: N/A;  pt states can only schedule on Wednesdays    CORONARY STENT PLACEMENT N/A 7/25/2019    Procedure: INSERTION, STENT, CORONARY ARTERY;  Surgeon: Miki Zendejas MD;  Location: [facility] CATH LAB;  Service: Cardiology;  Laterality: N/A;    DIALYSIS FISTULA CREATION      FISTULOGRAM N/A 2/18/2019    Procedure: Fistulogram;  Surgeon: Yaneth De La Cruz MD;  Location: [facility] CATH LAB;  Service: Cardiology;  Laterality: N/A;    FISTULOGRAM Right 7/23/2019    Procedure: Fistulogram;  Surgeon:  Oz Cordoba MD;  Location: [facility] CATH LAB;  Service: Cardiology;  Laterality: Right;    LEFT HEART CATHETERIZATION Left 7/25/2019    Procedure: Left heart cath;  Surgeon: Miki Zendejas MD;  Location: [facility] CATH LAB;  Service: Cardiology;  Laterality: Left;    VASCULAR SURGERY       Family History   Problem Relation Age of Onset    Hypertension Mother     Diabetes Mother     Hypertension Father     Hypertension Sister     Kidney disease Brother     Hypertension Brother     Heart disease Brother     Cancer Neg Hx     Colon cancer Neg Hx     Esophageal cancer Neg Hx     Stomach cancer Neg Hx     Rectal cancer Neg Hx     Ulcerative colitis Neg Hx     Irritable bowel syndrome Neg Hx     Crohn's disease Neg Hx     Celiac disease Neg Hx      Social History     Socioeconomic History    Marital status: Single     Spouse name: Not on file    Number of children: Not on file    Years of education: Not on file    Highest education level: Not on file   Occupational History     Employer: [employer]   Social Needs    Financial resource strain: Not on file    Food insecurity:     Worry: Not on file     Inability: Not on file    Transportation needs:     Medical: Not on file     Non-medical: Not on file   Tobacco Use    Smoking status: Never Smoker    Smokeless tobacco: Never Used   Substance and Sexual Activity    Alcohol use: No    Drug use: No    Sexual activity: Yes     Partners: Female     Birth control/protection: None   Lifestyle    Physical activity:     Days per week: Not on file     Minutes per session: Not on file    Stress: Not on file   Relationships    Social connections:     Talks on phone: Not on file     Gets together: Not on file     Attends Sikhism service: Not on file     Active member of club or organization: Not on file     Attends meetings of clubs or organizations: Not on file     Relationship status: Not on file   Other Topics Concern    Not on file   Social  History Narrative    Disabled     for one year    One son age 11    History of blood transfusion       Current Outpatient Medications:     amLODIPine (NORVASC) 10 MG tablet, Take 1 tablet (10 mg total) by mouth once daily., Disp: 30 tablet, Rfl: 3    aspirin 81 MG Chew, Take 1 tablet (81 mg total) by mouth once daily., Disp: 90 tablet, Rfl: 0    atorvastatin (LIPITOR) 40 MG tablet, Take 1 tablet (40 mg total) by mouth once daily., Disp: 90 tablet, Rfl: 3    AURYXIA 210 mg iron Tab, , Disp: , Rfl:     calcium acetate (PHOSLO) 667 mg capsule, Take 1,334 mg by mouth., Disp: , Rfl:     clopidogrel (PLAVIX) 75 mg tablet, Take 1 tablet (75 mg total) by mouth once daily., Disp: 30 tablet, Rfl: 5    ergocalciferol (ERGOCALCIFEROL) 50,000 unit Cap, Vitamin D2 1,250 mcg (50,000 unit) capsule  Take 1 capsule every month by oral route., Disp: , Rfl:     fenofibrate 160 MG Tab, Take 160 mg by mouth once daily., Disp: , Rfl: 0    metoprolol succinate (TOPROL XL) 100 MG 24 hr tablet, Toprol  mg tablet,extended release  Take 1 tablet every day by oral route., Disp: , Rfl:     tadalafil (CIALIS) 20 MG Tab, Cialis 20 mg tablet  Take 1 pill by oral route every 3 days, Disp: , Rfl:   No current facility-administered medications for this visit.     Facility-Administered Medications Ordered in Other Visits:     0.9%  NaCl infusion, , Intravenous, Continuous, Michelle Spence MD    ceFAZolin injection 2 g, 2 g, Intravenous, On Call Procedure, Michelle Spence MD    lidocaine (PF) 10 mg/ml (1%) injection 10 mg, 1 mL, Intradermal, Once, Michelle Spence MD    REVIEW OF SYSTEMS:  General: negative; ENT: negative; Allergy and Immunology: negative; Hematological and Lymphatic: Negative; Endocrine: negative; Respiratory: no cough, shortness of breath, or wheezing; Cardiovascular: no chest pain or dyspnea on exertion; Gastrointestinal: no abdominal pain/back, change in bowel habits, or bloody stools;  Genito-Urinary: no dysuria, trouble voiding, or hematuria; Musculoskeletal: negative  Neurological: no TIA or stroke symptoms; Psychiatric: no nervousness, anxiety or depression.    PHYSICAL EXAM:      Pulse: 65  Temp: 97.9 °F (36.6 °C)      General appearance:  Alert, well-appearing, and in no distress.  Oriented to person, place, and time   Neurological: Normal speech, no focal findings noted; CN II - XII grossly intact           Musculoskeletal: Digits/nail without cyanosis/clubbing.  Normal muscle strength/tone.                 Neck: Supple, no significant adenopathy; thyroid is not enlarged                  No carotid bruit can be auscultated                Chest:  Clear to auscultation, no wheezes, rales or rhonchi, symmetric air entry     No use of accessory muscles             Cardiac: Normal rate and regular rhythm, S1 and S2 normal; PMI non-displaced          Abdomen: Soft, nontender, nondistended, no masses or organomegaly     No rebound tenderness noted; bowel sounds normal     Pulsatile aortic mass is not palpable.     No groin adenopathy      Extremities:   2+ femoral pulses bilaterally     2+ pedal pulses palpable.     No pre-tibial edema     No ulcerations      R radial pulse 2+. Fistula with good thrill fist 2-3 cm, then becomes very pulsatile. Multiple moderate sized pseudoaneurysms at access sites    LAB RESULTS:  Lab Results   Component Value Date    K 4.4 01/02/2020    K 4.2 07/25/2019    K 4.3 07/09/2019    CREATININE 12.4 (H) 01/02/2020    CREATININE 10.7 (H) 07/25/2019    CREATININE 11.8 (H) 07/09/2019     Lab Results   Component Value Date    WBC 8.42 01/02/2020    WBC 7.36 07/25/2019    WBC 7.14 07/09/2019    HCT 35.4 (L) 01/02/2020    HCT 33.8 (L) 07/25/2019    HCT 34.1 (L) 07/09/2019     (H) 01/02/2020     (H) 07/25/2019     (H) 07/09/2019     Lab Results   Component Value Date    HGBA1C 4.4 (L) 06/05/2014    HGBA1C 5.9 12/16/2013     IMAGING:  US 4/30/20: no  significant stenosis. Flow volume 896 (1194 prior). Non-occlusive thrombus in mid-upper forearm, same as prior study 1/16/19    IMP/PLAN:  44 y.o. male with ESRD, HTN, DM with R RC AVF originally placed in 2013. He then had fistulagram in February 2019 that showed patent AVF, but R cephalic v. occlusion with outflow via the basilic system. He had another fistulagram in July 2019 that showed no significant stenosis. Duplex today shows no stenosis, but slight drop in flow volume from 1194 to 896. Without a stenosis, likely no way to treat and improve flow to fistula. Will plan for alternate access creation    - Plan for brachiocephalic fistula creation in near future. As cephalic already appears fairly matured on prior fistulogram, should be able to access in 2 weeks post op.    Jamarcus Alfonso MD   Fellow, Vascular/Endovascular Surgery      STAFF ADDENDUM    I have reviewed the relevant data and the resident's assessment and agree with the findings and plan as outlined above.    Oz Cordoba MD  Vascular & Endovascular Surgery

## 2020-05-04 ENCOUNTER — TELEPHONE (OUTPATIENT)
Dept: VASCULAR SURGERY | Facility: CLINIC | Age: 45
End: 2020-05-04

## 2020-05-04 DIAGNOSIS — Z01.818 PRE-OPERATIVE CLEARANCE: Primary | ICD-10-CM

## 2020-05-04 NOTE — PRE-PROCEDURE INSTRUCTIONS
Patient stated that he went yesterday for his Covid testing and nobody was there to do the test.Today he has dialysis so can't go.Patient stated that he was cancelling his procedure for tomorrow and would notify 's office.Message regarding this conversation sent to Katelyn Blair and Felisha Baugh in 's office

## 2020-05-04 NOTE — TELEPHONE ENCOUNTER
Spoke to the pt , He explained to me that he went to have covid test done on Sunday ,but no one was out there @ 11 to do his test. I sent a message to Dr. Cordoba to find out what or how we will proceed.Pt may need to r/s or have a rapid test done the morning of surgery.

## 2020-05-04 NOTE — TELEPHONE ENCOUNTER
----- Message from Sommer Mariee sent at 5/4/2020  9:19 AM CDT -----  Contact: Self  Mr. Robinson called to say he is going to have to move his surgery.  He went yesterday to have his COVID test done, but there was no one there.  Please call him at [phone].  His surgery is tomorrow.      Sommer

## 2020-05-06 ENCOUNTER — ANESTHESIA EVENT (OUTPATIENT)
Dept: SURGERY | Facility: HOSPITAL | Age: 45
End: 2020-05-06
Payer: MEDICARE

## 2020-05-06 ENCOUNTER — TELEPHONE (OUTPATIENT)
Dept: VASCULAR SURGERY | Facility: CLINIC | Age: 45
End: 2020-05-06

## 2020-05-06 RX ORDER — MIDAZOLAM HYDROCHLORIDE 1 MG/ML
0.5 INJECTION INTRAMUSCULAR; INTRAVENOUS
Status: CANCELLED | OUTPATIENT
Start: 2020-05-06

## 2020-05-06 RX ORDER — FENTANYL CITRATE 50 UG/ML
25 INJECTION, SOLUTION INTRAMUSCULAR; INTRAVENOUS EVERY 5 MIN PRN
Status: CANCELLED | OUTPATIENT
Start: 2020-05-06

## 2020-05-06 NOTE — TELEPHONE ENCOUNTER
Left message for the to call the clinic because he did not show up to get a COVID testing today @ 10:40. Pt need this test before surgery on tomorrow.

## 2020-05-07 ENCOUNTER — ANESTHESIA (OUTPATIENT)
Dept: SURGERY | Facility: HOSPITAL | Age: 45
End: 2020-05-07
Payer: MEDICARE

## 2020-05-19 ENCOUNTER — TELEPHONE (OUTPATIENT)
Dept: VASCULAR SURGERY | Facility: CLINIC | Age: 45
End: 2020-05-19

## 2020-05-19 NOTE — TELEPHONE ENCOUNTER
----- Message from Sommer Mariee sent at 5/19/2020  3:35 PM CDT -----  Contact: Self  Mr. Dickinson has questions regarding his surgery on the 26th.  Please call him at [phone].    Sommer

## 2020-05-25 ENCOUNTER — TELEPHONE (OUTPATIENT)
Dept: VASCULAR SURGERY | Facility: CLINIC | Age: 45
End: 2020-05-25

## 2020-05-25 ENCOUNTER — LAB VISIT (OUTPATIENT)
Dept: INTERNAL MEDICINE | Facility: CLINIC | Age: 45
End: 2020-05-25
Attending: SURGERY
Payer: MEDICARE

## 2020-05-25 DIAGNOSIS — Z01.818 PRE-OPERATIVE CLEARANCE: ICD-10-CM

## 2020-05-25 LAB — SARS-COV-2 RNA RESP QL NAA+PROBE: NOT DETECTED

## 2020-05-25 PROCEDURE — U0003 INFECTIOUS AGENT DETECTION BY NUCLEIC ACID (DNA OR RNA); SEVERE ACUTE RESPIRATORY SYNDROME CORONAVIRUS 2 (SARS-COV-2) (CORONAVIRUS DISEASE [COVID-19]), AMPLIFIED PROBE TECHNIQUE, MAKING USE OF HIGH THROUGHPUT TECHNOLOGIES AS DESCRIBED BY CMS-2020-01-R: HCPCS

## 2020-05-25 RX ORDER — LISINOPRIL 10 MG/1
10 TABLET ORAL DAILY
COMMUNITY
Start: 2020-05-17 | End: 2021-05-25

## 2020-05-25 RX ORDER — CLOPIDOGREL BISULFATE 75 MG/1
TABLET ORAL
COMMUNITY
End: 2021-05-25

## 2020-05-26 ENCOUNTER — HOSPITAL ENCOUNTER (OUTPATIENT)
Facility: HOSPITAL | Age: 45
Discharge: HOME | End: 2020-05-26
Attending: SURGERY | Admitting: SURGERY
Payer: MEDICARE

## 2020-05-26 DIAGNOSIS — N18.6 ESRD (END STAGE RENAL DISEASE): ICD-10-CM

## 2020-05-26 RX ORDER — MUPIROCIN 20 MG/G
OINTMENT TOPICAL
Status: DISCONTINUED | OUTPATIENT
Start: 2020-05-26 | End: 2020-05-26 | Stop reason: HOSPADM

## 2020-05-26 RX ORDER — CEFAZOLIN SODIUM 1 G/3ML
2 INJECTION, POWDER, FOR SOLUTION INTRAMUSCULAR; INTRAVENOUS
Status: DISCONTINUED | OUTPATIENT
Start: 2020-05-26 | End: 2020-05-26 | Stop reason: HOSPADM

## 2020-05-26 RX ORDER — LIDOCAINE HYDROCHLORIDE 10 MG/ML
1 INJECTION, SOLUTION EPIDURAL; INFILTRATION; INTRACAUDAL; PERINEURAL ONCE
Status: DISCONTINUED | OUTPATIENT
Start: 2020-05-26 | End: 2020-05-26 | Stop reason: HOSPADM

## 2020-05-26 NOTE — PROGRESS NOTES
Vascular Surgery Note    Patient ate this morning. Will reschedule his procedure. Office to reach out to him with new date    Jamarcus Alfonso MD   Fellow, Vascular/Endovascular Surgery

## 2020-05-26 NOTE — PROGRESS NOTES
Patient's case was cancelled due to NPO violation per Dr. Cordoba's team. Awaiting call back from team to speak with patient and discharge orders.

## 2020-05-26 NOTE — ANESTHESIA PREPROCEDURE EVALUATION
[facility]  Anesthesia Pre-Operative Evaluation       Patient Name: Haroldo Dickinson  YOB: 1975  MRN: [id number]  [facility]: [id number]      Code Status: Prior   Date of Procedure: 6/23/2020  Anesthesia: Local MAC Procedure: Procedure(s) (LRB):  Fistulogram (Right)  Pre-Operative Diagnosis: ESRD (end stage renal disease) on dialysis [N18.6, Z99.2]  Proceduralist: Surgeon(s) and Role:     * Oz Cordoba MD - Primary        SUBJECTIVE:   Haroldo Dickinson is a 44 y.o. male who  has a past medical history of CAD (coronary artery disease), native coronary artery (11/21/2019), Diabetes mellitus, Dialysis patient, DM type 2 causing renal disease, not at goal, ESRD (end stage renal disease) started dialysis 01/2014 (6/5/2014), Hyperparathyroidism, secondary renal (6/5/2014), Hypertension, NSTEMI (non-ST elevated myocardial infarction) (12/21/2013), Organ transplant candidate (6/5/2014), Pneumonia, Renal hypertension, and Stroke.. Renal failure due to HTN and is currently dialyzing M/W/F via the AVF.  Revised cardiac risk index (RCRI) score is 5.    he has a current medication list which includes the following long-term medication(s): amlodipine, aspirin, atorvastatin, clopidogrel, clopidogrel, fenofibrate, and tadalafil.     ALLERGIES:     Review of patient's allergies indicates:   Allergen Reactions    No known allergies      LDA:      Lines/Drains/Airways     Drain                 Hemodialysis AV Fistula Right forearm -- days               Anesthesia Evaluation      Airway   Mallampati: III  Dental      Pulmonary    (+) pneumonia,   Cardiovascular   (+) hypertension, past MI, CAD, CABG/stent,     Neuro/Psych    (+) CVA,     GI/Hepatic/Renal    (+) chronic renal disease ESRD and Dialysis,     Endo/Other    (+) diabetes mellitus type 2,   Abdominal                     MEDICATIONS:     Antibiotics (From admission, onward)    None        VTE Risk Mitigation (From  admission, onward)    None        No current facility-administered medications for this encounter.      Facility-Administered Medications Ordered in Other Encounters   Medication Dose Route Frequency Provider Last Rate Last Dose    0.9%  NaCl infusion   Intravenous Continuous Michelle Spence MD        ceFAZolin injection 2 g  2 g Intravenous On Call Procedure Michelle Spence MD        lidocaine (PF) 10 mg/ml (1%) injection 10 mg  1 mL Intradermal Once Michelle Spence MD              History:   There are no hospital problems to display for this patient.    Surgical History:    has a past surgical history that includes Dialysis fistula creation; Vascular surgery; Colonoscopy (N/A, 5/3/2017); Fistulogram (N/A, 2/18/2019); Fistulogram (Right, 7/23/2019); Left heart catheterization (Left, 7/25/2019); and Coronary stent placement (N/A, 7/25/2019).   Social History:    reports being sexually active and has had partner(s) who are Female. He reports using the following method of birth control/protection: None.  reports that he has never smoked. He has never used smokeless tobacco. He reports that he does not drink alcohol or use drugs.     OBJECTIVE:     Vital Signs (Most Recent):    Vital Signs Range (Last 24H):          There is no height or weight on file to calculate BMI.   Wt Readings from Last 4 Encounters:   04/30/20 79.4 kg (175 lb 0.7 oz)   03/05/20 78.5 kg (173 lb)   02/06/20 78.8 kg (173 lb 11.6 oz)   01/16/20 78 kg (171 lb 15.3 oz)       Significant Labs:  Lab Results   Component Value Date    WBC 9.64 04/30/2020    HGB 13.3 (L) 04/30/2020    HCT 43.5 04/30/2020     04/30/2020     04/30/2020    K 4.1 04/30/2020    CL 97 04/30/2020    CREATININE 9.6 (H) 04/30/2020    BUN 45 (H) 04/30/2020    CO2 29 04/30/2020     (H) 04/30/2020    CALCIUM 8.5 (L) 04/30/2020    MG 2.4 01/07/2014    PHOS 8.4 (H) 01/07/2014    ALKPHOS 62 01/02/2020    ALT 83 (H) 01/02/2020    AST 57 (H)  01/02/2020    ALBUMIN 3.5 01/02/2020    INR 1.1 07/25/2019    APTT 21.4 06/05/2014    HGBA1C 4.4 (L) 06/05/2014     (H) 12/16/2013    CPKMB 3.7 12/16/2013    TROPONINI 0.047 (H) 09/22/2015    MB 0.8 12/16/2013    BNP 26 09/21/2015     Recent Results (from the past 72 hour(s))   COVID-19 Routine Screening    Collection Time: 06/20/20 10:34 AM   Result Value Ref Range    SARS-CoV2 (COVID-19) Qualitative PCR Not Detected Not Detected       EKG:   Results for orders placed or performed during the hospital encounter of 07/25/19   EKG 12-LEAD on arrival to floor    Collection Time: 07/25/19  2:48 PM    Narrative    Test Reason : I25.10,    Vent. Rate : 058 BPM     Atrial Rate : 058 BPM     P-R Int : 176 ms          QRS Dur : 100 ms      QT Int : 448 ms       P-R-T Axes : 050 019 054 degrees     QTc Int : 439 ms    Sinus bradycardia  Moderate voltage criteria for LVH, may be normal variant  Borderline Abnormal ECG  When compared with ECG of 25-JUL-2019 10:06,  No significant change was found  Confirmed by Jagjit SIMPSON, Karissa ([id number]) on 7/25/2019 4:22:43 PM    Referred By: ANIKET CLAIRE           Confirmed By:Karissa Danielson MD       TTE:  Results for orders placed or performed during the hospital encounter of 03/05/20   Echo Color Flow Doppler? Yes   Result Value Ref Range    Ascending aorta 3.24 cm    STJ 2.69 cm    AV mean gradient 6 mmHg    Ao peak gerardo 1.71 m/s    Ao VTI 39.41 cm    IVS 1.33 (A) 0.6 - 1.1 cm    LA size 4.13 cm    Left Atrium Major Axis 5.34 cm    Left Atrium Minor Axis 5.01 cm    LVIDD 5.07 3.5 - 6.0 cm    LVIDS 3.41 2.1 - 4.0 cm    LVOT diameter 2.14 cm    LVOT peak VTI 25.60 cm    PW 1.27 (A) 0.6 - 1.1 cm    MV Peak A Gerardo 0.53 m/s    E wave decelartion time 167.30 msec    MV Peak E Gerardo 0.74 m/s    PV Peak D Gerardo 0.60 m/s    PV Peak S Gerardo 0.37 m/s    RA Major Axis 4.70 cm    RA Width 3.52 cm    RVDD 3.72 cm    Sinus 3.31 cm    TAPSE 2.36 cm    TR Max Gerardo 1.94 m/s    TDI LATERAL 0.08 m/s    TDI  SEPTAL 0.06 m/s    LA WIDTH 4.47 cm    LV Diastolic Volume 122.38 mL    LV Systolic Volume 47.78 mL    RV S' 15.37 cm/s    LVOT peak bobo 1.13 m/s    LV LATERAL E/E' RATIO 9.25 m/s    LV SEPTAL E/E' RATIO 12.33 m/s    FS 33 %    LA volume 81.12 cm3    LV mass 267.58 g    Left Ventricle Relative Wall Thickness 0.50 cm    AV valve area 2.34 cm2    AV Velocity Ratio 0.66     AV index (prosthetic) 0.65     E/A ratio 1.40     Mean e' 0.07 m/s    Pulm vein S/D ratio 0.62     LVOT area 3.6 cm2    LVOT stroke volume 92.03 cm3    AV peak gradient 12 mmHg    E/E' ratio 10.57 m/s    Triscuspid Valve Regurgitation Peak Gradient 15 mmHg    BSA 1.93 m2    LV Systolic Volume Index 25.1 mL/m2    LV Diastolic Volume Index 64.37 mL/m2    LA Volume Index 42.7 mL/m2    LV Mass Index 141 g/m2    Right Atrial Pressure (from IVC) 3 mmHg    TV rest pulmonary artery pressure 18 mmHg    Narrative    · Concentric left ventricular hypertrophy.  · Normal left ventricular systolic function. The estimated ejection   fraction is 60%.  · Normal right ventricular systolic function.  · Indeterminate left ventricular diastolic function.  · Moderate left atrial enlargement.  · The estimated PA systolic pressure is 18 mmHg.  · Normal central venous pressure (3 mmHg).        Nuc Rest EF   Date Value Ref Range Status   03/28/2019 75  Final      Results for orders placed or performed during the hospital encounter of 02/14/17   2D echo with color flow doppler   Result Value Ref Range    QEF 60 55 - 65    Diastolic Dysfunction Yes (A)     Est. PA Systolic Pressure 19     Tricuspid Valve Regurgitation MILD      RANULFO:  No results found. However, due to the size of the patient record, not all encounters were searched. Please check Results Review for a complete set of results.  Stress Test:  Results for orders placed in visit on 03/28/19   NM (Lexiscan) Card Interp Stress test (Cupid Only)    Narrative · There is a small sized, mild intensity reversible defect in  the   inferoapical wall comprising 6% of the LV myocardium. Summed stress score   is 2.  · There is a prior study available for comparison that was performed on   12/1/2015.  · Gated SPECT perfusion images showed a normal ejection fraction of >65%   at rest.  · Resting and post-stress wall motion is physiologic.  · LV cavity size is normal at rest.  · The EKG portion of this study is abnormal but not diagnostic due to   baseline repolarization abnormality secondary to LVH  · The patient reported SOB (non-anginal) during the stress test.  · There were no arrhythmias during stress.  · When compared to the previous study from 12/1/2015, there is a new   small, mild inferoapical reversible perfusion abnormality.         [facility]:  Results for orders placed during the hospital encounter of 07/25/19   Cardiac catheterization    Narrative · A STENT RESOLUTE HAYLEY [id number] stent was successfully placed at 18 ADRIANA   for 5 sec.  · A STENT RESOLUTE HAYLEY [id number] stent was successfully placed at 18 ADRIANA   for 5 sec.  · Estimated blood loss: none  · Single vessel coronary artery disease.  · Successful PCI.  · Dual antiplatelet therapy for 3 months.     I certify that I was present for catheter insertion, catheter   manipulation, angiography, and angiographic interpretation of this   patient.    Procedure Log documented by Documenter: RT Hilda and verified   by Miki Zendejas.    Date: 7/25/2019  Time: 2:19 PM        PFT:  No results found for: FEV1, FVC, PUV7ROF, TLC, DLCO     ASSESSMENT/PLAN:       Anesthesia Evaluation          Review of Systems  Anesthesia Hx:  No problems with previous Anesthesia   Social:  Non-Smoker, No Alcohol Use    Hematology/Oncology:        Hematology Comments: Plavix therapy   Cardiovascular:   Hypertension Past MI CAD  CABG/stent  hyperlipidemia    Pulmonary:   Pneumonia    Renal/:   Chronic Renal Disease, ESRD, Dialysis    Neurological:   CVA    Endocrine:   Diabetes, type 2         Physical Exam  General:  Well nourished    Airway/Jaw/Neck:  Airway Findings: Mouth Opening: Normal Tongue: Normal  General Airway Assessment: Adult  Mallampati: III            Mental Status:  Mental Status Findings:  Alert and Oriented, Cooperative         Anesthesia Plan  Type of Anesthesia, risks & benefits discussed:  Anesthesia Type:  regional, general, MAC  Patient's Preference:   Intra-op Monitoring Plan: standard ASA monitors  Intra-op Monitoring Plan Comments:   Post Op Pain Control Plan:   Post Op Pain Control Plan Comments:   Induction:   IV  Beta Blocker:  Patient is on a Beta-Blocker and has received one dose within the past 24 hours (No further documentation required).       Informed Consent: Patient understands risks and agrees with Anesthesia plan.  Questions answered. Anesthesia consent signed with patient.  ASA Score: 4     Day of Surgery Review of History & Physical:    H&P update referred to the surgeon.         Ready For Surgery From Anesthesia Perspective.

## 2020-05-26 NOTE — PROGRESS NOTES
Patient stated he drank a Nepro supplement drink at 0730 am this morning. Dr. Wong (anesthesia) notified and stated that it will be an 8 hour delay. Notified Vascular. Awaiting to hear from Dr. Cordoba.

## 2020-05-26 NOTE — PROGRESS NOTES
Dr. Cordoba's resident spoke with the patient and his mom. The office will call the patient to reschedule. Patient is getting dressed and will ambulate out for discharge.

## 2020-05-26 NOTE — HPI
Patient ate this morning. Will reschedule his procedure. Office to reach out to him with new date     Jamarcus Alfonso MD   Fellow, Vascular/Endovascular Surgery    Oz Cordoba MD  Vascular & Endovascular Surgery

## 2020-06-10 ENCOUNTER — TELEPHONE (OUTPATIENT)
Dept: VASCULAR SURGERY | Facility: CLINIC | Age: 45
End: 2020-06-10

## 2020-06-10 NOTE — TELEPHONE ENCOUNTER
----- Message from Iwona Plasencia sent at 6/10/2020 12:55 PM CDT -----  Pt need to reschedule his appt and would like for the nurse to give him a call back to schedule this appt. Pt [phone] or [phone]      Odin [phone] would like a call back too.

## 2020-06-16 ENCOUNTER — TELEPHONE (OUTPATIENT)
Dept: VASCULAR SURGERY | Facility: CLINIC | Age: 45
End: 2020-06-16

## 2020-06-17 ENCOUNTER — TELEPHONE (OUTPATIENT)
Dept: VASCULAR SURGERY | Facility: CLINIC | Age: 45
End: 2020-06-17

## 2020-06-17 DIAGNOSIS — N18.6 ESRD (END STAGE RENAL DISEASE) ON DIALYSIS: Primary | ICD-10-CM

## 2020-06-17 DIAGNOSIS — Z99.2 ESRD (END STAGE RENAL DISEASE) ON DIALYSIS: Primary | ICD-10-CM

## 2020-06-20 ENCOUNTER — LAB VISIT (OUTPATIENT)
Dept: URGENT CARE | Facility: CLINIC | Age: 45
End: 2020-06-20
Payer: MEDICARE

## 2020-06-20 DIAGNOSIS — Z01.818 PRE-OP EVALUATION: ICD-10-CM

## 2020-06-20 PROCEDURE — U0003 INFECTIOUS AGENT DETECTION BY NUCLEIC ACID (DNA OR RNA); SEVERE ACUTE RESPIRATORY SYNDROME CORONAVIRUS 2 (SARS-COV-2) (CORONAVIRUS DISEASE [COVID-19]), AMPLIFIED PROBE TECHNIQUE, MAKING USE OF HIGH THROUGHPUT TECHNOLOGIES AS DESCRIBED BY CMS-2020-01-R: HCPCS | Mod: TXP

## 2020-06-22 ENCOUNTER — TELEPHONE (OUTPATIENT)
Dept: VASCULAR SURGERY | Facility: CLINIC | Age: 45
End: 2020-06-22

## 2020-06-22 LAB — SARS-COV-2 RNA RESP QL NAA+PROBE: NOT DETECTED

## 2020-06-23 PROBLEM — I77.0 ARTERIOVENOUS FISTULA: Status: ACTIVE | Noted: 2020-06-23

## 2020-06-23 PROCEDURE — 63600175 PHARM REV CODE 636 W HCPCS: Mod: TXP | Performed by: STUDENT IN AN ORGANIZED HEALTH CARE EDUCATION/TRAINING PROGRAM

## 2020-06-23 PROCEDURE — D9220A PRA ANESTHESIA: Mod: NTX,,, | Performed by: ANESTHESIOLOGY

## 2020-06-23 PROCEDURE — D9220A PRA ANESTHESIA: ICD-10-PCS | Mod: NTX,,, | Performed by: ANESTHESIOLOGY

## 2020-06-23 RX ORDER — MIDAZOLAM HYDROCHLORIDE 1 MG/ML
INJECTION, SOLUTION INTRAMUSCULAR; INTRAVENOUS
Status: DISCONTINUED | OUTPATIENT
Start: 2020-06-23 | End: 2020-06-23

## 2020-06-23 RX ADMIN — MIDAZOLAM HYDROCHLORIDE 2 MG: 1 INJECTION, SOLUTION INTRAMUSCULAR; INTRAVENOUS at 11:06

## 2020-06-23 NOTE — ANESTHESIA POSTPROCEDURE EVALUATION
Anesthesia Post Evaluation    Patient: Haroldo Dickinson    Procedure(s) Performed: Procedure(s) (LRB):  Fistulogram (Right)    Final Anesthesia Type: other (light sedation)    Patient location during evaluation: PACU  Patient participation: Yes- Able to Participate  Level of consciousness: awake and alert  Post-procedure vital signs: reviewed and stable  Pain management: adequate  Airway patency: patent    PONV status at discharge: No PONV  Anesthetic complications: no      Cardiovascular status: blood pressure returned to baseline  Respiratory status: unassisted  Hydration status: euvolemic  Follow-up not needed.          Vitals Value Taken Time   /71 06/23/20 1052   Temp 36.9 °C (98.5 °F) 06/23/20 1051   Pulse 74 06/23/20 1051   Resp 18 06/23/20 1051   SpO2 97 % 06/23/20 1051         No case tracking events are documented in the log.      Pain/Marialuisa Score: No data recorded

## 2020-06-23 NOTE — TRANSFER OF CARE
"Anesthesia Transfer of Care Note    Patient: Haroldo Dickinson    Procedure(s) Performed: Procedure(s) (LRB):  Fistulogram (Right)    Patient location: [facility]    Anesthesia Type: other: light sedation    Transport from OR: Transported from OR on room air with adequate spontaneous ventilation    Post pain: adequate analgesia    Post assessment: no apparent anesthetic complications    Post vital signs: stable    Level of consciousness: awake, alert and oriented    Nausea/Vomiting: no nausea/vomiting    Complications: none    Transfer of care protocol was followed      Last vitals:   Visit Vitals  /71   Pulse 74   Temp 36.9 °C (98.5 °F) (Oral)   Resp 18   Ht 5' 8" (1.727 m)   Wt 78.9 kg (174 lb)   SpO2 97%   BMI 26.46 kg/m²     "

## 2020-07-13 ENCOUNTER — TELEPHONE (OUTPATIENT)
Dept: TRANSPLANT | Facility: CLINIC | Age: 45
End: 2020-07-13

## 2020-07-13 NOTE — TELEPHONE ENCOUNTER
Spoke to mother of patient, Gabby, to confirm that she is still his primary caregiver. Gabby did confirm and states she will accompany patient to appt on 7/14/20, which is a SW only visit with caregiver.

## 2020-07-14 ENCOUNTER — OFFICE VISIT (OUTPATIENT)
Dept: TRANSPLANT | Facility: CLINIC | Age: 45
End: 2020-07-14
Payer: MEDICARE

## 2020-07-14 ENCOUNTER — TELEPHONE (OUTPATIENT)
Dept: TRANSPLANT | Facility: CLINIC | Age: 45
End: 2020-07-14

## 2020-07-14 DIAGNOSIS — Z76.82 PATIENT ON WAITING LIST FOR KIDNEY TRANSPLANT: Primary | Chronic | ICD-10-CM

## 2020-07-14 PROCEDURE — 99999 PR PBB SHADOW E&M-EST. PATIENT-LVL III: CPT | Mod: PBBFAC,TXP,,

## 2020-07-14 PROCEDURE — 99213 OFFICE O/P EST LOW 20 MIN: CPT | Mod: PBBFAC,TXP

## 2020-07-14 PROCEDURE — 99499 NO LOS: ICD-10-PCS | Mod: S$PBB,TXP,, | Performed by: INTERNAL MEDICINE

## 2020-07-14 PROCEDURE — 99499 UNLISTED E&M SERVICE: CPT | Mod: S$PBB,TXP,, | Performed by: INTERNAL MEDICINE

## 2020-07-14 PROCEDURE — 99999 PR PBB SHADOW E&M-EST. PATIENT-LVL III: ICD-10-PCS | Mod: PBBFAC,TXP,,

## 2020-07-14 NOTE — TELEPHONE ENCOUNTER
----- Message from Luis Tran sent at 7/14/2020 11:57 AM CDT -----  Pt calling rosanahedule today's appt    [phone]

## 2020-07-15 NOTE — PROGRESS NOTES
Transplant Recipient Adult Psychosocial Assessment Update    Haroldo Dickinson  [address] [facility] [address] [facility] [address]  Telephone Information:   Mobile [phone]   Mobile [phone]   Home  [phone] (home)  Work  [phone] (work)  E-mail  No e-mail address on record    Sex: male  YOB: 1975  Age: 44 y.o.    Encounter Date: 7/14/2020  [place] Citizen: yes  Primary Language: English   Needed: no    Emergency Contact:  Name: Gabby Dickinson   Relationship: mother,   59  Yr  Old female,  A&Ox4.    Address: [address] [place] [address].    Phone Numbers:  [phone] (mobile)    Family/Social Support:   Number of dependents/: Pt's son is 16 yo (Kings ) who resides with his mother   Marital history: pt is    Other family dynamics: Mother came with pt during update.  Pt's parents are both living.  Father is Hamilton Vera of [facility] [phone] and not very involved in pt's care.  Pt has one sister and two brothers:  Ariadne Dickinson, sister age 36, drives [phone]; Hamilton Dickinson age 35, brother, drives [phone] are available to assist pt with his care.  Pt resides with mother and grandmother.     Household Composition:    Name: Jemma Dickinson     Age: 79 and 59  Yr  Old  Female,    N.O.  [place]    Relationship: grandmother  Does person drive? yes      Do you and your caregivers have access to reliable transportation? yes pt and mother utilize [employer] and pt's grandmother drives   PRIMARY CAREGIVER:   jarad Pierre's mother  will be primary caregiver, phone number [phone]. SHE DOES NOT DRIVE AND WILL RELY UPON OTHER FAMILY MEMBERS FOR TRANSPORTATION.  PT'S GRANDMOTHER PROVIDES TRANSPORTATION.     Caregiver states understanding all aspects of caregiver role/commitment.  Caregiver states is able/willing/committed to being caregiver to the fullest extent necessary.  provided in-depth information to patient and caregiver regarding  pre- and post-transplant caregiver role.  Patient and caregiver verbalizes understanding of the education provided today.   strongly encourages patient and caregiver to have concrete plan regarding post-transplant care giving, including back-up caregiver(s) to ensure care giving needs are met as needed.  Patient and caregiver verbalizes understanding of caregiver responsibilities.   remains available. Patient and caregiver agree to contact  in a timely manner if concerns arise.  Able to take time off work without financial concerns: N/A.     Additional Significant Others who will Assist with Transplant:    Ginger Gale, 64, aunt, DYLAN [phone], drives  Caroline Dickinson, 79, grandmother, DYLAN, [phone], drives    Living Will: no  Healthcare Power of : no Pt reports trusting mother with medical decisions as needed   Advance Directives on file: <<no information> per medical record.  Verbally reviewed LW/HCPA information.   provided patient with copy of LW/HCPA documents and provided education on completion of forms    Living Donors: No.    Highest Education Level: High School (9-12) or GED  Reading Ability: QUESTIONABLE  SINCE  PT  HAD  CVA IN  NOV 2013 and has some cognitive deficits   Reports difficulty with: reading, writing, seeing, comprehension, learning and memory  Learns Best By:  COMPLETE  CARE  NEEDED  WITH MEDICATIONS,  DRIVING  AND ADL CARE.     Status: no  VA Benefits: no     Working for Income: No  If no, reason not working: Disability  Spouse/Significant Other Employment:     Disabled: yes: date disability began: 2013, due to: ESRD.    Monthly Income:   Disability: $960.00  Able to afford all costs now and if transplanted, including medications: no  Patient verbalizes understanding of personal responsibilities related to transplant costs and the importance of having a financial plan to ensure that patients transplant costs are  fully covered.   provided fundraising information/education.  Patient verbalizes understanding.   remains available.    Insurance:   Payor/Plan Subscr  Sex Relation Sub. Ins. ID Effective Group Num   1. MEDICARE - ME* OTMMIE VARGAS 1975 Male  [id number] 3/1/14                                    [address]       Secondary Insurance (for UNOS reporting): Public Insurance - Medicare FFS (Fee For Service)  Patient verbalizes clear understanding that patient may experience difficulty obtaining and/or be denied insurance coverage post-surgery. This includes and is not limited to disability insurance, life insurance, health insurance, burial insurance, long term care insurance, and other insurances.  Patient also reports understanding that future health concerns related to or unrelated to transplantation may not be covered by patient's insurance.  Resources and information provided and reviewed.      Patient provides verbal permission to release any necessary information to outside resources for patient care and discharge planning.  Resources and information provided are reviewed.      Dialysis History:  Type: hemodialysis in center  Schedule: MWF a.m.    Unit: [facility] [address] [facility], [place] [address]  Phone: [phone]  Fax: [phone]  MD Name: Dr Victoriano SIMPSON Phone: [facility]  [phone]    Dialysis Adherence: Patient reports good  Adherence.SW also faxed adherence form.     Infusion Service: patient utilizing? no  Home Health: patient utilizing? no  DME: yes  Uses a cane for ambulation  Pulmonary/Cardiac Rehab: no    ADLS:  Pt  DOES  NOT  DRIVE,  Had  A  NEW  CVA  IN  .  Pt/mothe r state  That  pts  Gait  And balance  Are  Much  Better.  Vision is poor.  Adherence:    Adherence education and counseling provided. Pt states current and expected compliance with all healthcare recommendations.    Per History Section:  Past  Medical History:   Diagnosis Date    CAD (coronary artery disease), native coronary artery 11/21/2019    Diabetes mellitus     Dialysis patient     DM type 2 causing renal disease, not at goal     ESRD (end stage renal disease) started dialysis 01/2014 6/5/2014    Hyperparathyroidism, secondary renal 6/5/2014    Hypertension     NSTEMI (non-ST elevated myocardial infarction) 12/21/2013    Organ transplant candidate 6/5/2014    Pneumonia     Renal hypertension     Stroke      Social History     Tobacco Use    Smoking status: Never Smoker    Smokeless tobacco: Never Used   Substance Use Topics    Alcohol use: No     Social History     Substance and Sexual Activity   Drug Use No     Social History     Substance and Sexual Activity   Sexual Activity Yes    Partners: Female    Birth control/protection: None       Per Today's Psychosocial:  Tobacco: none, patient denies any use.  Alcohol: none, patient denies any use.  Illicit Drugs/Non-prescribed Medications: none, patient denies any use.    Patient states clear understanding of the potential impact of substance use as it relates to transplant candidacy and is aware of possible random substance screening.  Substance abstinence/cessation counseling, education and resources provided and reviewed.     Arrests/DWI/Treatment/Rehab: patient denies    Psychiatric History:    Mental Health: Pt denies history of anxiety, depression and or overwhelming feelings of sadness at this time or in the past.   Psychiatrist/Counselor: Pt denies currently or in the past and reports willingness to meet with psychiatry if required by transplant.   Medications:  Pt denies utilizing mental health medications currently or in the past  Suicide/Homicide Issues: Pt denies feelings of wanting to harm self or other currently or in the past  Safety at home: Pt reports feeling safe at home.      Coping:  Pt states he romulo by watching TV and sitting outside.  He had been attending  [employer], but quit going about 3-4 months ago.      Knowledge: Patient states having clear understanding and realistic expectations regarding the potential risks and potential benefits of organ transplantation and organ donation and agrees to discuss with health care team members and support system members, as well as to utilize available resources and express questions and/or concerns in order to further facilitate the pt informed decision-making.  Resources and information provided and reviewed.     Patient is aware of [facility]'s affiliation and/or partnership with agencies in home health care, LTAC, SNF, [facility], and other hospitals and clinics.    Understanding: Patient reports having a clear understanding of the many lifetime commitments involved with being a transplant recipient, including costs, compliance, medications, lab work, procedures, appointments, concrete and financial planning, preparedness, timely and appropriate communication of concerns, abstinence (ETOH, tobacco, illicit non-prescribed drugs), adherence to all health care team recommendations, support system and caregiver involvement, appropriate and timely resource utilization and follow-through, mental health counseling as needed/recommended, and patient and caregiver responsibilities.  Social Service Handbook, resources and detailed educational information provided and reviewed.  Educational information provided.    Patient also reports current and expected compliance with health care regime, and patient states having a clear understanding of the importance of compliance.  Patient reports a clear understanding that risks and benefits may be involved with organ transplantation and with organ donation.  Patient also reports clear understanding that psychosocial risk factors may affect patient, and include but are not limited to feelings of depression, generalized anxiety, anxiety regarding dependence on others, post traumatic stress disorder, feelings  of guilt and other emotional and/or mental concerns, and/or exacerbation of existing mental health concerns.  Detailed resources provided and discussed.  Patient agrees to access appropriate resources in a timely manner as needed and/or as recommended, and to communicate concerns appropriately.  Patient also reports a clear understanding of treatment options available.      Patient and caregiver received education in a group setting.   reviewed education, provided additional information, and answered questions.    Feelings or Concerns: Pt nor mother identified any concerns.      Goals: Pt reports post transplant goal of living a healthier life.     Interview Behavior: Patient and mother presents as alert and oriented x 4, pleasant, well groomed, calm, cooperative and pt answered questions upon questioning and did not volunteer info or expand upon thoughts.  His mother was present with his permission and was very quiet throughout the interview.  Pt's mother presents with pt. Both highly engaged and motivated for transplant.          Transplant Social Work - Candidacy  Assessment/Plan:     Psychosocial Suitability: Patient presents as a suitable candidate for kidney transplant at this time. Based on psychosocial risk factors, patient presents as medium risk, due to POST CVA  STATUS and resulting cognitive deficits.  Pt requires assistance with all adls.  He appears to have adequate assistance from elderly grandmother and mother.       Recommendations/Additional Comments: Caregivers need to be present for all appointments.  Recommend fundraising.     Edi Zendejas, MIRTA, MIGUELINASW

## 2020-07-23 DIAGNOSIS — Z76.82 ORGAN TRANSPLANT CANDIDATE: Primary | ICD-10-CM

## 2020-07-28 ENCOUNTER — TELEPHONE (OUTPATIENT)
Dept: TRANSPLANT | Facility: CLINIC | Age: 45
End: 2020-07-28

## 2020-08-26 PROBLEM — Z98.61 POST PTCA: Status: ACTIVE | Noted: 2020-08-26

## 2020-08-27 ENCOUNTER — TELEPHONE (OUTPATIENT)
Dept: TRANSPLANT | Facility: CLINIC | Age: 45
End: 2020-08-27

## 2020-08-27 NOTE — TELEPHONE ENCOUNTER
----- Message from Deirdre Bernal MA sent at 8/27/2020  3:24 PM CDT -----  Patient no showed his appointment today with cardiology.

## 2020-08-28 DIAGNOSIS — Z76.82 ORGAN TRANSPLANT CANDIDATE: Primary | ICD-10-CM

## 2020-09-02 ENCOUNTER — TELEPHONE (OUTPATIENT)
Dept: TRANSPLANT | Facility: CLINIC | Age: 45
End: 2020-09-02

## 2020-09-10 ENCOUNTER — HOSPITAL ENCOUNTER (OUTPATIENT)
Dept: RADIOLOGY | Facility: HOSPITAL | Age: 45
Discharge: HOME | End: 2020-09-10
Attending: NURSE PRACTITIONER
Payer: MEDICARE

## 2020-09-10 DIAGNOSIS — Z76.82 ORGAN TRANSPLANT CANDIDATE: ICD-10-CM

## 2020-09-10 PROCEDURE — 74176 CT ABD & PELVIS W/O CONTRAST: CPT | Mod: 26,TXP,, | Performed by: RADIOLOGY

## 2020-09-10 PROCEDURE — 93978 VASCULAR STUDY: CPT | Mod: 26,TXP,, | Performed by: RADIOLOGY

## 2020-09-10 PROCEDURE — 74176 CT ABDOMEN PELVIS WITHOUT CONTRAST: ICD-10-PCS | Mod: 26,TXP,, | Performed by: RADIOLOGY

## 2020-09-10 PROCEDURE — 93978 US DOPP ILIACS BILATERAL: ICD-10-PCS | Mod: 26,TXP,, | Performed by: RADIOLOGY

## 2020-09-10 PROCEDURE — 93978 VASCULAR STUDY: CPT | Mod: TC,TXP

## 2020-09-10 PROCEDURE — 74176 CT ABD & PELVIS W/O CONTRAST: CPT | Mod: TC,TXP

## 2020-10-01 ENCOUNTER — TELEPHONE (OUTPATIENT)
Dept: CARDIOLOGY | Facility: CLINIC | Age: 45
End: 2020-10-01

## 2020-10-01 ENCOUNTER — HOSPITAL ENCOUNTER (OUTPATIENT)
Dept: CARDIOLOGY | Facility: CLINIC | Age: 45
Discharge: HOME | End: 2020-10-01
Payer: MEDICARE

## 2020-10-01 ENCOUNTER — OFFICE VISIT (OUTPATIENT)
Dept: CARDIOLOGY | Facility: CLINIC | Age: 45
End: 2020-10-01
Payer: MEDICARE

## 2020-10-01 VITALS
HEART RATE: 75 BPM | HEIGHT: 68 IN | DIASTOLIC BLOOD PRESSURE: 72 MMHG | BODY MASS INDEX: 27.63 KG/M2 | WEIGHT: 182.31 LBS | SYSTOLIC BLOOD PRESSURE: 122 MMHG

## 2020-10-01 DIAGNOSIS — N18.6 CONTROLLED TYPE 2 DIABETES MELLITUS WITH CHRONIC KIDNEY DISEASE ON CHRONIC DIALYSIS, WITHOUT LONG-TERM CURRENT USE OF INSULIN: Chronic | ICD-10-CM

## 2020-10-01 DIAGNOSIS — Z76.82 ORGAN TRANSPLANT CANDIDATE: ICD-10-CM

## 2020-10-01 DIAGNOSIS — Z99.2 CONTROLLED TYPE 2 DIABETES MELLITUS WITH CHRONIC KIDNEY DISEASE ON CHRONIC DIALYSIS, WITHOUT LONG-TERM CURRENT USE OF INSULIN: Chronic | ICD-10-CM

## 2020-10-01 DIAGNOSIS — Z01.818 PRE-OP EXAM: ICD-10-CM

## 2020-10-01 DIAGNOSIS — Z99.2 ESRD ON HEMODIALYSIS: ICD-10-CM

## 2020-10-01 DIAGNOSIS — N18.6 ESRD ON HEMODIALYSIS: ICD-10-CM

## 2020-10-01 DIAGNOSIS — Z01.818 PRE-OP EXAM: Primary | ICD-10-CM

## 2020-10-01 DIAGNOSIS — I10 ESSENTIAL HYPERTENSION: ICD-10-CM

## 2020-10-01 DIAGNOSIS — I25.10 CORONARY ARTERY DISEASE INVOLVING NATIVE CORONARY ARTERY OF NATIVE HEART WITHOUT ANGINA PECTORIS: ICD-10-CM

## 2020-10-01 DIAGNOSIS — E78.2 MIXED HYPERLIPIDEMIA: ICD-10-CM

## 2020-10-01 DIAGNOSIS — E11.22 CONTROLLED TYPE 2 DIABETES MELLITUS WITH CHRONIC KIDNEY DISEASE ON CHRONIC DIALYSIS, WITHOUT LONG-TERM CURRENT USE OF INSULIN: Chronic | ICD-10-CM

## 2020-10-01 DIAGNOSIS — Z01.810 PREOPERATIVE CARDIOVASCULAR EXAMINATION: Primary | ICD-10-CM

## 2020-10-01 DIAGNOSIS — I63.19 CEREBROVASCULAR ACCIDENT (CVA) DUE TO EMBOLISM OF OTHER PRECEREBRAL ARTERY: ICD-10-CM

## 2020-10-01 DIAGNOSIS — Z98.61 POST PTCA: ICD-10-CM

## 2020-10-01 PROCEDURE — 99214 PR OFFICE/OUTPT VISIT, EST, LEVL IV, 30-39 MIN: ICD-10-PCS | Mod: S$PBB,TXP,, | Performed by: INTERNAL MEDICINE

## 2020-10-01 PROCEDURE — 99999 PR PBB SHADOW E&M-EST. PATIENT-LVL V: CPT | Mod: PBBFAC,TXP,, | Performed by: INTERNAL MEDICINE

## 2020-10-01 PROCEDURE — 99999 PR PBB SHADOW E&M-EST. PATIENT-LVL V: ICD-10-PCS | Mod: PBBFAC,TXP,, | Performed by: INTERNAL MEDICINE

## 2020-10-01 PROCEDURE — 99215 OFFICE O/P EST HI 40 MIN: CPT | Mod: PBBFAC,TXP,25 | Performed by: INTERNAL MEDICINE

## 2020-10-01 PROCEDURE — 99214 OFFICE O/P EST MOD 30 MIN: CPT | Mod: S$PBB,TXP,, | Performed by: INTERNAL MEDICINE

## 2020-10-01 PROCEDURE — 93005 ELECTROCARDIOGRAM TRACING: CPT | Mod: PBBFAC,TXP | Performed by: INTERNAL MEDICINE

## 2020-10-01 PROCEDURE — 93010 ELECTROCARDIOGRAM REPORT: CPT | Mod: S$PBB,TXP,, | Performed by: INTERNAL MEDICINE

## 2020-10-01 PROCEDURE — 93010 EKG 12-LEAD: ICD-10-PCS | Mod: S$PBB,TXP,, | Performed by: INTERNAL MEDICINE

## 2020-10-01 NOTE — PROGRESS NOTES
Subjective:   Patient ID:  Haroldo Dicknison is a 45 y.o. male who presents for  Pre-op Exam (kidney transplant)      HPI:   The patient  presents for risk stratification for kidney transplantation. Haroldo Dickinson has known coronary artery disease having had recent stent placement in his RCA.  He has had CVA in the past with residual right hemiparesis.  Today he has noted some mild left chest aching but has had no significant chest pain since his intervention.  Has no problems during dialysis.Haroldo Dickinson denies shortness of breath, palpitations, presyncope , or syncope.  He has hypertension with good control.  An echocardiogram done 6 months ago demonstrated normal left ventricular systolic function as well as normal pulmonary artery pressures.Haroldo Dickinson has dyslipidemia  on high intensity statin At LDL goal  .  He does not smoke.  Review of Systems   Constitution: Negative for malaise/fatigue, weight gain and weight loss.   Eyes: Negative for blurred vision.   Cardiovascular: Negative for chest pain, claudication, cyanosis, dyspnea on exertion, irregular heartbeat, leg swelling, near-syncope, orthopnea, palpitations, paroxysmal nocturnal dyspnea and syncope.   Respiratory: Negative for cough, shortness of breath and wheezing.    Musculoskeletal: Negative for falls and myalgias.   Gastrointestinal: Negative for abdominal pain, heartburn, nausea and vomiting.   Genitourinary: Negative for nocturia.   Neurological: Positive for focal weakness. Negative for brief paralysis, dizziness, headaches, numbness, paresthesias and weakness.   Psychiatric/Behavioral: Negative for altered mental status.       Current Outpatient Medications   Medication Sig    amLODIPine (NORVASC) 10 MG tablet Take 1 tablet (10 mg total) by mouth once daily.    atorvastatin (LIPITOR) 40 MG tablet Take 1 tablet (40 mg total) by mouth once daily.    AURYXIA 210 mg iron Tab     calcium acetate (PHOSLO) 667 mg capsule Take 1,334 mg by mouth.  "   clopidogrel (PLAVIX) 75 mg tablet Take 1 tablet (75 mg total) by mouth once daily.    clopidogreL (PLAVIX) 75 mg tablet clopidogrel 75 mg tablet   TAKE 1 TABLET BY MOUTH EVERY DAY    ergocalciferol (ERGOCALCIFEROL) 50,000 unit Cap Vitamin D2 1,250 mcg (50,000 unit) capsule   Take 1 capsule every month by oral route.    fenofibrate 160 MG Tab Take 160 mg by mouth once daily.    lisinopriL 10 MG tablet Take 10 mg by mouth once daily.    metoprolol succinate (TOPROL XL) 100 MG 24 hr tablet Toprol  mg tablet,extended release   Take 1 tablet every day by oral route.    tadalafil (CIALIS) 20 MG Tab Cialis 20 mg tablet   Take 1 pill by oral route every 3 days     Current Facility-Administered Medications   Medication    sodium chloride 0.9% flush 10 mL     Facility-Administered Medications Ordered in Other Visits   Medication    0.9%  NaCl infusion    ceFAZolin injection 2 g    lidocaine (PF) 10 mg/ml (1%) injection 10 mg     Objective:   Physical Exam   Constitutional: He is oriented to person, place, and time. He appears well-developed. No distress.   /72 (BP Location: Left arm, Patient Position: Sitting, BP Method: Large (Automatic))   Pulse 75   Ht 5' 8" (1.727 m)   Wt 82.7 kg (182 lb 5.1 oz)   BMI 27.72 kg/m²    HENT:   Head: Normocephalic.   Right Ear: External ear normal.   Left Ear: External ear normal.   Eyes: Pupils are equal, round, and reactive to light. EOM are normal. No scleral icterus.   Neck: Neck supple. No JVD present. No thyromegaly present.   Cardiovascular: Normal rate, regular rhythm, normal heart sounds and intact distal pulses. PMI is not displaced. Exam reveals no gallop and no friction rub.   No murmur heard.  Patent fistula right forearm   Pulmonary/Chest: Effort normal and breath sounds normal. No respiratory distress. He has no wheezes. He has no rales.   Abdominal: Soft. He exhibits no distension. There is no hepatosplenomegaly. There is no abdominal tenderness. "   Musculoskeletal:         General: No tenderness or edema.      Comments: In a wheel chair and uses a cane.   Neurological: He is alert and oriented to person, place, and time.   Skin: Skin is warm and dry. No rash noted.   Psychiatric: He has a normal mood and affect. His behavior is normal.       Lab Results   Component Value Date     04/30/2020    K 4.1 04/30/2020    CL 97 04/30/2020    CO2 29 04/30/2020    BUN 45 (H) 04/30/2020    CREATININE 9.6 (H) 04/30/2020     (H) 04/30/2020    HGBA1C 4.4 (L) 06/05/2014    MG 2.4 01/07/2014    AST 57 (H) 01/02/2020    ALT 83 (H) 01/02/2020    ALBUMIN 3.5 01/02/2020    PROT 8.2 01/02/2020    BILITOT 0.8 01/02/2020    WBC 9.64 04/30/2020    HGB 13.3 (L) 04/30/2020    HCT 43.5 04/30/2020     (H) 04/30/2020     04/30/2020    TSH 0.989 10/13/2015    CHOL 111 (L) 01/02/2020    HDL 56 01/02/2020    LDLCALC 48.2 (L) 01/02/2020    TRIG 34 01/02/2020       Assessment:     1. Preoperative cardiovascular examination    2. Organ transplant candidate    3. Coronary artery disease involving native coronary artery of native heart without angina pectoris : stable   4. Post PTCA :  RCA   5. Essential hypertension:  Good control    6. Mixed hyperlipidemia: At LDL goal    7. Cerebrovascular accident (CVA) due to embolism of other precerebral artery    8. Controlled type 2 diabetes mellitus with chronic kidney disease on chronic dialysis, without long-term current use of insulin    9. ESRD on hemodialysis        Plan:     Haroldo was seen today for pre-op exam.    Diagnoses and all orders for this visit:    Preoperative cardiovascular examination  The patient's revised cardiovascular risk index is 15% for significant 30 day perioperative cardiovascular events.  This is modified somewhat due to the fact that he has had corrective intervention in his affected coronary artery.  The other risk determinants are his prior stroke and end-stage kidney disease.  He would be an  acceptable but moderately increased cardiovascular risk for the anticipated transplant surgery.  Organ transplant candidate  -    Coronary artery disease involving native coronary artery of native heart without angina pectoris    Post PTCA    Essential hypertension  Continue current regimen  Mixed hyperlipidemia  Continue current regimen  Cerebrovascular accident (CVA) due to embolism of other precerebral artery    Controlled type 2 diabetes mellitus with chronic kidney disease on chronic dialysis, without long-term current use of insulin    ESRD on hemodialysis

## 2020-10-07 ENCOUNTER — TELEPHONE (OUTPATIENT)
Dept: TRANSPLANT | Facility: CLINIC | Age: 45
End: 2020-10-07

## 2020-10-07 NOTE — TELEPHONE ENCOUNTER
----- Message from Kirill Colin MD sent at 10/7/2020 11:53 AM CDT -----  Yes but would place him on low dose aspirin with food in its place.  ----- Message -----  From: Miguelina Walker  Sent: 10/7/2020  11:46 AM CDT  To: Kirill Colin MD    Hi Dr. Colin,  You saw Mr. Haroldo Dickinson on 10/1. Just wanted to ask if his Plavix can be held in the event of an organ offer for kidney transplant? Thank you.     Miguelina

## 2020-10-08 ENCOUNTER — TELEPHONE (OUTPATIENT)
Dept: TRANSPLANT | Facility: CLINIC | Age: 45
End: 2020-10-08

## 2020-10-08 NOTE — TELEPHONE ENCOUNTER
----- Message from Darin Byrd MD sent at 10/8/2020 10:58 AM CDT -----  Regarding: RE: ok to reactivate?  Ok colonoscopy and activation if pathology favorable after GI review. 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 Thanks     Darin   ----- Message -----  From: Miguelina Walker  Sent: 10/8/2020   9:21 AM CDT  To: [facility]  Subject: ok to reactivate?                                Permission to reactive on the wait list. He was inactive due to Heart cath with Plavix (now cleared by Cardiology), Cleared by  for caregiver support, and needed CT abd pelvis which was reviewed by Dr. Willard as favorable. He does need a repeat colonoscopy. Please advise if  you want him to have that before reactivating. Thanks.      Miguelina

## 2020-10-09 DIAGNOSIS — Z12.11 SPECIAL SCREENING FOR MALIGNANT NEOPLASMS, COLON: Primary | ICD-10-CM

## 2020-11-10 DIAGNOSIS — Z76.82 ORGAN TRANSPLANT CANDIDATE: Primary | ICD-10-CM

## 2020-11-24 DIAGNOSIS — Z01.810 ENCOUNTER FOR PREPROCEDURAL CARDIOVASCULAR EXAMINATION: ICD-10-CM

## 2020-11-24 DIAGNOSIS — Z76.82 PRE-KIDNEY TRANSPLANT, LISTED: Primary | ICD-10-CM

## 2020-11-24 DIAGNOSIS — Z76.82 ORGAN TRANSPLANT CANDIDATE: Primary | ICD-10-CM

## 2020-12-07 ENCOUNTER — TELEPHONE (OUTPATIENT)
Dept: TRANSPLANT | Facility: CLINIC | Age: 45
End: 2020-12-07

## 2020-12-15 ENCOUNTER — TELEPHONE (OUTPATIENT)
Dept: TRANSPLANT | Facility: CLINIC | Age: 45
End: 2020-12-15

## 2020-12-15 NOTE — TELEPHONE ENCOUNTER
"Spoke to Ms. Pierre, mother of patient due to being listed as primary phone number at the time to inform her that patient needs a colonoscopy. Mother of patient stated, "call Haroldo, he needs to take care of scheduling his own appts". Called Haroldo at [phone] and provided phone number to Endoscopy [phone] to schedule procedure. Pt stated, "  I thought I had one". I informed him that it was May 2017 with a 3 year repeat so he was due in May 2020.  "

## 2021-01-11 ENCOUNTER — TELEPHONE (OUTPATIENT)
Dept: ENDOSCOPY | Facility: HOSPITAL | Age: 46
End: 2021-01-11

## 2021-01-15 DIAGNOSIS — Z01.818 PRE-OP TESTING: Primary | ICD-10-CM

## 2021-01-15 DIAGNOSIS — Z99.2 DIALYSIS PATIENT: ICD-10-CM

## 2021-01-15 DIAGNOSIS — Z99.2 DIALYSIS PATIENT: Primary | ICD-10-CM

## 2021-01-15 DIAGNOSIS — Z12.11 COLON CANCER SCREENING: Primary | ICD-10-CM

## 2021-01-15 RX ORDER — SODIUM, POTASSIUM,MAG SULFATES 17.5-3.13G
SOLUTION, RECONSTITUTED, ORAL ORAL
Qty: 1 KIT | Refills: 0 | Status: ON HOLD | OUTPATIENT
Start: 2021-01-15 | End: 2021-02-10 | Stop reason: HOSPADM

## 2021-02-04 ENCOUNTER — EPISODE CHANGES (OUTPATIENT)
Dept: TRANSPLANT | Facility: CLINIC | Age: 46
End: 2021-02-04

## 2021-02-06 ENCOUNTER — LAB VISIT (OUTPATIENT)
Dept: INTERNAL MEDICINE | Facility: CLINIC | Age: 46
End: 2021-02-06
Payer: MEDICARE

## 2021-02-06 DIAGNOSIS — Z01.818 PRE-OP TESTING: ICD-10-CM

## 2021-02-06 PROCEDURE — U0003 INFECTIOUS AGENT DETECTION BY NUCLEIC ACID (DNA OR RNA); SEVERE ACUTE RESPIRATORY SYNDROME CORONAVIRUS 2 (SARS-COV-2) (CORONAVIRUS DISEASE [COVID-19]), AMPLIFIED PROBE TECHNIQUE, MAKING USE OF HIGH THROUGHPUT TECHNOLOGIES AS DESCRIBED BY CMS-2020-01-R: HCPCS

## 2021-02-07 LAB — SARS-COV-2 RNA RESP QL NAA+PROBE: NOT DETECTED

## 2021-02-09 ENCOUNTER — ANESTHESIA (OUTPATIENT)
Dept: ENDOSCOPY | Facility: HOSPITAL | Age: 46
End: 2021-02-09
Payer: MEDICARE

## 2021-02-09 ENCOUNTER — TELEPHONE (OUTPATIENT)
Dept: ENDOSCOPY | Facility: HOSPITAL | Age: 46
End: 2021-02-09

## 2021-02-09 ENCOUNTER — ANESTHESIA EVENT (OUTPATIENT)
Dept: ENDOSCOPY | Facility: HOSPITAL | Age: 46
End: 2021-02-09
Payer: MEDICARE

## 2021-02-09 ENCOUNTER — TELEPHONE (OUTPATIENT)
Dept: CARDIOLOGY | Facility: CLINIC | Age: 46
End: 2021-02-09

## 2021-02-09 ENCOUNTER — HOSPITAL ENCOUNTER (OUTPATIENT)
Facility: HOSPITAL | Age: 46
Discharge: HOME | End: 2021-02-09
Attending: INTERNAL MEDICINE | Admitting: INTERNAL MEDICINE
Payer: MEDICARE

## 2021-02-09 ENCOUNTER — TELEPHONE (OUTPATIENT)
Dept: TRANSPLANT | Facility: CLINIC | Age: 46
End: 2021-02-09

## 2021-02-09 VITALS
TEMPERATURE: 98 F | OXYGEN SATURATION: 98 % | RESPIRATION RATE: 15 BRPM | SYSTOLIC BLOOD PRESSURE: 165 MMHG | HEIGHT: 68 IN | DIASTOLIC BLOOD PRESSURE: 77 MMHG | HEART RATE: 87 BPM | WEIGHT: 174 LBS | BODY MASS INDEX: 26.37 KG/M2

## 2021-02-09 DIAGNOSIS — Z12.11 COLON CANCER SCREENING: ICD-10-CM

## 2021-02-09 DIAGNOSIS — Z76.82 AWAITING ORGAN TRANSPLANT: Primary | ICD-10-CM

## 2021-02-09 DIAGNOSIS — Z99.2 DIALYSIS PATIENT: Primary | ICD-10-CM

## 2021-02-09 DIAGNOSIS — Z12.11 SPECIAL SCREENING FOR MALIGNANT NEOPLASMS, COLON: Primary | ICD-10-CM

## 2021-02-09 DIAGNOSIS — Z12.11 SCREENING FOR COLON CANCER: Primary | ICD-10-CM

## 2021-02-09 DIAGNOSIS — Z12.11 SPECIAL SCREENING FOR MALIGNANT NEOPLASMS, COLON: ICD-10-CM

## 2021-02-09 LAB — POCT GLUCOSE: 95 MG/DL (ref 70–110)

## 2021-02-09 PROCEDURE — E9220 PRA ENDO ANESTHESIA: HCPCS | Mod: TXP,,, | Performed by: NURSE ANESTHETIST, CERTIFIED REGISTERED

## 2021-02-09 PROCEDURE — 25000003 PHARM REV CODE 250: Mod: TXP | Performed by: NURSE ANESTHETIST, CERTIFIED REGISTERED

## 2021-02-09 PROCEDURE — 37000009 HC ANESTHESIA EA ADD 15 MINS: Mod: TXP | Performed by: INTERNAL MEDICINE

## 2021-02-09 PROCEDURE — 63600175 PHARM REV CODE 636 W HCPCS: Mod: TXP | Performed by: NURSE ANESTHETIST, CERTIFIED REGISTERED

## 2021-02-09 PROCEDURE — E9220 PRA ENDO ANESTHESIA: ICD-10-PCS | Mod: TXP,,, | Performed by: NURSE ANESTHETIST, CERTIFIED REGISTERED

## 2021-02-09 PROCEDURE — G0105 COLORECTAL SCRN; HI RISK IND: HCPCS | Mod: 53,TXP,, | Performed by: INTERNAL MEDICINE

## 2021-02-09 PROCEDURE — G0105 COLORECTAL SCRN; HI RISK IND: HCPCS | Mod: TXP | Performed by: INTERNAL MEDICINE

## 2021-02-09 PROCEDURE — 37000008 HC ANESTHESIA 1ST 15 MINUTES: Mod: TXP | Performed by: INTERNAL MEDICINE

## 2021-02-09 PROCEDURE — 82962 GLUCOSE BLOOD TEST: CPT | Mod: TXP | Performed by: INTERNAL MEDICINE

## 2021-02-09 PROCEDURE — G0105 COLORECTAL SCRN; HI RISK IND: ICD-10-PCS | Mod: 53,TXP,, | Performed by: INTERNAL MEDICINE

## 2021-02-09 PROCEDURE — 25000003 PHARM REV CODE 250: Mod: TXP | Performed by: INTERNAL MEDICINE

## 2021-02-09 RX ORDER — LIDOCAINE HYDROCHLORIDE 20 MG/ML
INJECTION INTRAVENOUS
Status: DISCONTINUED | OUTPATIENT
Start: 2021-02-09 | End: 2021-02-09

## 2021-02-09 RX ORDER — SODIUM, POTASSIUM,MAG SULFATES 17.5-3.13G
1 SOLUTION, RECONSTITUTED, ORAL ORAL DAILY
Qty: 1 KIT | Refills: 0 | Status: ON HOLD | OUTPATIENT
Start: 2021-02-09 | End: 2021-02-10 | Stop reason: HOSPADM

## 2021-02-09 RX ORDER — SODIUM CHLORIDE 9 MG/ML
INJECTION, SOLUTION INTRAVENOUS CONTINUOUS
Status: DISCONTINUED | OUTPATIENT
Start: 2021-02-09 | End: 2021-02-09 | Stop reason: HOSPADM

## 2021-02-09 RX ORDER — PROPOFOL 10 MG/ML
VIAL (ML) INTRAVENOUS
Status: DISCONTINUED | OUTPATIENT
Start: 2021-02-09 | End: 2021-02-09

## 2021-02-09 RX ADMIN — SODIUM CHLORIDE: 0.9 INJECTION, SOLUTION INTRAVENOUS at 11:02

## 2021-02-09 RX ADMIN — PROPOFOL 50 MG: 10 INJECTION, EMULSION INTRAVENOUS at 11:02

## 2021-02-09 RX ADMIN — LIDOCAINE HYDROCHLORIDE 50 MG: 20 INJECTION, SOLUTION INTRAVENOUS at 11:02

## 2021-02-10 ENCOUNTER — ANESTHESIA (OUTPATIENT)
Dept: ENDOSCOPY | Facility: HOSPITAL | Age: 46
End: 2021-02-10
Payer: MEDICARE

## 2021-02-10 PROBLEM — Z86.010 HISTORY OF COLON POLYPS: Status: ACTIVE | Noted: 2021-02-10

## 2021-02-10 PROBLEM — Z86.0100 HISTORY OF COLON POLYPS: Status: ACTIVE | Noted: 2021-02-10

## 2021-02-10 PROCEDURE — 25000003 PHARM REV CODE 250: Mod: TXP | Performed by: NURSE ANESTHETIST, CERTIFIED REGISTERED

## 2021-02-10 PROCEDURE — E9220 PRA ENDO ANESTHESIA: ICD-10-PCS | Mod: TXP,,, | Performed by: NURSE ANESTHETIST, CERTIFIED REGISTERED

## 2021-02-10 PROCEDURE — 63600175 PHARM REV CODE 636 W HCPCS: Mod: TXP | Performed by: NURSE ANESTHETIST, CERTIFIED REGISTERED

## 2021-02-10 PROCEDURE — E9220 PRA ENDO ANESTHESIA: HCPCS | Mod: TXP,,, | Performed by: NURSE ANESTHETIST, CERTIFIED REGISTERED

## 2021-02-10 RX ORDER — SODIUM CHLORIDE 9 MG/ML
INJECTION, SOLUTION INTRAVENOUS CONTINUOUS PRN
Status: DISCONTINUED | OUTPATIENT
Start: 2021-02-10 | End: 2021-02-10

## 2021-02-10 RX ORDER — LIDOCAINE HCL/PF 100 MG/5ML
SYRINGE (ML) INTRAVENOUS
Status: DISCONTINUED | OUTPATIENT
Start: 2021-02-10 | End: 2021-02-10

## 2021-02-10 RX ORDER — PROPOFOL 10 MG/ML
VIAL (ML) INTRAVENOUS
Status: DISCONTINUED | OUTPATIENT
Start: 2021-02-10 | End: 2021-02-10

## 2021-02-10 RX ORDER — PROPOFOL 10 MG/ML
VIAL (ML) INTRAVENOUS CONTINUOUS PRN
Status: DISCONTINUED | OUTPATIENT
Start: 2021-02-10 | End: 2021-02-10

## 2021-02-10 RX ADMIN — PROPOFOL 125 MCG/KG/MIN: 10 INJECTION, EMULSION INTRAVENOUS at 09:02

## 2021-02-10 RX ADMIN — PROPOFOL 20 MG: 10 INJECTION, EMULSION INTRAVENOUS at 09:02

## 2021-02-10 RX ADMIN — PROPOFOL 80 MG: 10 INJECTION, EMULSION INTRAVENOUS at 09:02

## 2021-02-10 RX ADMIN — Medication 100 MG: at 09:02

## 2021-02-10 RX ADMIN — SODIUM CHLORIDE: 9 INJECTION, SOLUTION INTRAVENOUS at 09:02

## 2021-02-15 ENCOUNTER — TELEPHONE (OUTPATIENT)
Dept: GASTROENTEROLOGY | Facility: CLINIC | Age: 46
End: 2021-02-15

## 2021-03-09 ENCOUNTER — TELEPHONE (OUTPATIENT)
Dept: CARDIOLOGY | Facility: CLINIC | Age: 46
End: 2021-03-09

## 2021-03-11 ENCOUNTER — HOSPITAL ENCOUNTER (OUTPATIENT)
Dept: CARDIOLOGY | Facility: HOSPITAL | Age: 46
Discharge: HOME | End: 2021-03-11
Attending: NURSE PRACTITIONER
Payer: MEDICARE

## 2021-03-11 DIAGNOSIS — Z76.82 ORGAN TRANSPLANT CANDIDATE: ICD-10-CM

## 2021-03-11 DIAGNOSIS — Z01.810 ENCOUNTER FOR PREPROCEDURAL CARDIOVASCULAR EXAMINATION: ICD-10-CM

## 2021-03-11 LAB
CV STRESS BASE HR: 64 BPM
DIASTOLIC BLOOD PRESSURE: 97 MMHG
END DIASTOLIC INDEX-HIGH: 170 ML/M2
END SYSTOLIC INDEX-HIGH: 70 ML/M2
NUC REST DIASTOLIC VOLUME INDEX: 129
NUC REST EJECTION FRACTION: 67
NUC REST SYSTOLIC VOLUME INDEX: 43
NUC STRESS DIASTOLIC VOLUME INDEX: 128
NUC STRESS EJECTION FRACTION: 71 %
NUC STRESS SYSTOLIC VOLUME INDEX: 37
OHS CV CPX 1 MINUTE RECOVERY HEART RATE: 98 BPM
OHS CV CPX 85 PERCENT MAX PREDICTED HEART RATE MALE: 150
OHS CV CPX MAX PREDICTED HEART RATE: 177
OHS CV CPX PATIENT IS FEMALE: 0
OHS CV CPX PATIENT IS MALE: 1
OHS CV CPX PEAK DIASTOLIC BLOOD PRESSURE: 97 MMHG
OHS CV CPX PEAK HEAR RATE: 83 BPM
OHS CV CPX PEAK RATE PRESSURE PRODUCT: NORMAL
OHS CV CPX PEAK SYSTOLIC BLOOD PRESSURE: 170 MMHG
OHS CV CPX PERCENT MAX PREDICTED HEART RATE ACHIEVED: 47
OHS CV CPX RATE PRESSURE PRODUCT PRESENTING: NORMAL
RETIRED EF AND QEF - SEE NOTES: 51 %
STRESS ST DEPRESSION: 0.5 MM
SYSTOLIC BLOOD PRESSURE: 170 MMHG

## 2021-03-11 PROCEDURE — 93016 CV STRESS TEST SUPVJ ONLY: CPT | Mod: TXP,,, | Performed by: INTERNAL MEDICINE

## 2021-03-11 PROCEDURE — 93018 CV STRESS TEST I&R ONLY: CPT | Mod: TXP,,, | Performed by: INTERNAL MEDICINE

## 2021-03-11 PROCEDURE — 93016 STRESS TEST WITH MYOCARDIAL PERFUSION (CUPID ONLY): ICD-10-PCS | Mod: TXP,,, | Performed by: INTERNAL MEDICINE

## 2021-03-11 PROCEDURE — A9502 TC99M TETROFOSMIN: HCPCS | Mod: TXP

## 2021-03-11 PROCEDURE — 78452 HT MUSCLE IMAGE SPECT MULT: CPT | Mod: TXP

## 2021-03-11 PROCEDURE — 78452 STRESS TEST WITH MYOCARDIAL PERFUSION (CUPID ONLY): ICD-10-PCS | Mod: 26,TXP,, | Performed by: INTERNAL MEDICINE

## 2021-03-11 PROCEDURE — 78452 HT MUSCLE IMAGE SPECT MULT: CPT | Mod: 26,TXP,, | Performed by: INTERNAL MEDICINE

## 2021-03-11 PROCEDURE — 93018 STRESS TEST WITH MYOCARDIAL PERFUSION (CUPID ONLY): ICD-10-PCS | Mod: TXP,,, | Performed by: INTERNAL MEDICINE

## 2021-03-11 PROCEDURE — 63600175 PHARM REV CODE 636 W HCPCS: Mod: TXP | Performed by: NURSE PRACTITIONER

## 2021-03-11 RX ORDER — REGADENOSON 0.08 MG/ML
0.4 INJECTION, SOLUTION INTRAVENOUS ONCE
Status: COMPLETED | OUTPATIENT
Start: 2021-03-11 | End: 2021-03-11

## 2021-03-11 RX ADMIN — REGADENOSON 0.4 MG: 0.08 INJECTION, SOLUTION INTRAVENOUS at 09:03

## 2021-03-12 ENCOUNTER — TELEPHONE (OUTPATIENT)
Dept: TRANSPLANT | Facility: CLINIC | Age: 46
End: 2021-03-12

## 2021-03-12 DIAGNOSIS — Z76.82 ORGAN TRANSPLANT CANDIDATE: Primary | ICD-10-CM

## 2021-04-01 DIAGNOSIS — Z76.82 ORGAN TRANSPLANT CANDIDATE: Primary | ICD-10-CM

## 2021-04-20 ENCOUNTER — TELEPHONE (OUTPATIENT)
Dept: TRANSPLANT | Facility: CLINIC | Age: 46
End: 2021-04-20

## 2021-05-25 ENCOUNTER — OFFICE VISIT (OUTPATIENT)
Dept: CARDIOLOGY | Facility: CLINIC | Age: 46
End: 2021-05-25
Payer: MEDICARE

## 2021-05-25 VITALS
BODY MASS INDEX: 28.5 KG/M2 | HEART RATE: 80 BPM | DIASTOLIC BLOOD PRESSURE: 86 MMHG | SYSTOLIC BLOOD PRESSURE: 187 MMHG | HEIGHT: 68 IN | WEIGHT: 188.06 LBS

## 2021-05-25 DIAGNOSIS — Z99.2 END STAGE RENAL FAILURE ON DIALYSIS: Chronic | ICD-10-CM

## 2021-05-25 DIAGNOSIS — Z98.61 POST PTCA: ICD-10-CM

## 2021-05-25 DIAGNOSIS — E78.2 MIXED HYPERLIPIDEMIA: ICD-10-CM

## 2021-05-25 DIAGNOSIS — I25.10 CORONARY ARTERY DISEASE INVOLVING NATIVE CORONARY ARTERY OF NATIVE HEART WITHOUT ANGINA PECTORIS: Primary | ICD-10-CM

## 2021-05-25 DIAGNOSIS — N18.6 END STAGE RENAL FAILURE ON DIALYSIS: Chronic | ICD-10-CM

## 2021-05-25 DIAGNOSIS — I10 ESSENTIAL HYPERTENSION: ICD-10-CM

## 2021-05-25 DIAGNOSIS — Z76.82 ORGAN TRANSPLANT CANDIDATE: ICD-10-CM

## 2021-05-25 DIAGNOSIS — I11.9 HYPERTENSIVE LEFT VENTRICULAR HYPERTROPHY, WITHOUT HEART FAILURE: Chronic | ICD-10-CM

## 2021-05-25 DIAGNOSIS — Z01.810 PREOPERATIVE CARDIOVASCULAR EXAMINATION: ICD-10-CM

## 2021-05-25 DIAGNOSIS — R94.39 ABNORMAL CARDIOVASCULAR STRESS TEST: ICD-10-CM

## 2021-05-25 PROCEDURE — 1126F PR PAIN SEVERITY QUANTIFIED, NO PAIN PRESENT: ICD-10-PCS | Mod: S$GLB,TXP,, | Performed by: INTERNAL MEDICINE

## 2021-05-25 PROCEDURE — 99999 PR PBB SHADOW E&M-EST. PATIENT-LVL IV: ICD-10-PCS | Mod: PBBFAC,TXP,, | Performed by: INTERNAL MEDICINE

## 2021-05-25 PROCEDURE — 3008F BODY MASS INDEX DOCD: CPT | Mod: CPTII,S$GLB,TXP, | Performed by: INTERNAL MEDICINE

## 2021-05-25 PROCEDURE — 99214 PR OFFICE/OUTPT VISIT, EST, LEVL IV, 30-39 MIN: ICD-10-PCS | Mod: S$GLB,TXP,, | Performed by: INTERNAL MEDICINE

## 2021-05-25 PROCEDURE — 99214 OFFICE O/P EST MOD 30 MIN: CPT | Mod: S$GLB,TXP,, | Performed by: INTERNAL MEDICINE

## 2021-05-25 PROCEDURE — 1126F AMNT PAIN NOTED NONE PRSNT: CPT | Mod: S$GLB,TXP,, | Performed by: INTERNAL MEDICINE

## 2021-05-25 PROCEDURE — 99999 PR PBB SHADOW E&M-EST. PATIENT-LVL IV: CPT | Mod: PBBFAC,TXP,, | Performed by: INTERNAL MEDICINE

## 2021-05-25 PROCEDURE — 3008F PR BODY MASS INDEX (BMI) DOCUMENTED: ICD-10-PCS | Mod: CPTII,S$GLB,TXP, | Performed by: INTERNAL MEDICINE

## 2021-05-25 RX ORDER — CARVEDILOL 25 MG/1
25 TABLET ORAL 2 TIMES DAILY WITH MEALS
Qty: 180 TABLET | Refills: 3 | Status: SHIPPED | OUTPATIENT
Start: 2021-05-25 | End: 2022-06-06

## 2021-06-01 DIAGNOSIS — Z76.82 ORGAN TRANSPLANT CANDIDATE: Primary | ICD-10-CM

## 2021-06-10 ENCOUNTER — HOSPITAL ENCOUNTER (OUTPATIENT)
Dept: RADIOLOGY | Facility: HOSPITAL | Age: 46
Discharge: HOME | End: 2021-06-10
Attending: NURSE PRACTITIONER
Payer: MEDICARE

## 2021-06-10 DIAGNOSIS — Z76.82 ORGAN TRANSPLANT CANDIDATE: Primary | ICD-10-CM

## 2021-06-10 DIAGNOSIS — N28.89 RIGHT RENAL MASS: ICD-10-CM

## 2021-06-10 DIAGNOSIS — Z76.82 ORGAN TRANSPLANT CANDIDATE: ICD-10-CM

## 2021-06-10 PROCEDURE — 71046 X-RAY EXAM CHEST 2 VIEWS: CPT | Mod: TC,TXP

## 2021-06-10 PROCEDURE — 76770 US EXAM ABDO BACK WALL COMP: CPT | Mod: 26,TXP,, | Performed by: RADIOLOGY

## 2021-06-10 PROCEDURE — 76770 US EXAM ABDO BACK WALL COMP: CPT | Mod: TC,TXP

## 2021-06-10 PROCEDURE — 71046 XR CHEST PA AND LATERAL: ICD-10-PCS | Mod: 26,TXP,, | Performed by: RADIOLOGY

## 2021-06-10 PROCEDURE — 71046 X-RAY EXAM CHEST 2 VIEWS: CPT | Mod: 26,TXP,, | Performed by: RADIOLOGY

## 2021-06-10 PROCEDURE — 76770 US RETROPERITONEAL COMPLETE: ICD-10-PCS | Mod: 26,TXP,, | Performed by: RADIOLOGY

## 2021-06-11 ENCOUNTER — TELEPHONE (OUTPATIENT)
Dept: TRANSPLANT | Facility: CLINIC | Age: 46
End: 2021-06-11

## 2021-06-15 ENCOUNTER — HOSPITAL ENCOUNTER (OUTPATIENT)
Dept: CARDIOLOGY | Facility: HOSPITAL | Age: 46
Discharge: HOME | End: 2021-06-15
Attending: INTERNAL MEDICINE
Payer: MEDICARE

## 2021-06-15 VITALS — SYSTOLIC BLOOD PRESSURE: 127 MMHG | DIASTOLIC BLOOD PRESSURE: 80 MMHG | HEART RATE: 85 BPM

## 2021-06-15 DIAGNOSIS — R94.39 ABNORMAL CARDIOVASCULAR STRESS TEST: ICD-10-CM

## 2021-06-15 DIAGNOSIS — Z98.61 POST PTCA: ICD-10-CM

## 2021-06-15 DIAGNOSIS — I25.10 CORONARY ARTERY DISEASE INVOLVING NATIVE CORONARY ARTERY OF NATIVE HEART WITHOUT ANGINA PECTORIS: ICD-10-CM

## 2021-06-15 DIAGNOSIS — Z01.810 PREOPERATIVE CARDIOVASCULAR EXAMINATION: ICD-10-CM

## 2021-06-15 LAB
CFR FLOW - ANTERIOR: 2.02
CFR FLOW - INFERIOR: 1.56
CFR FLOW - LATERAL: 2
CFR FLOW - MAX: 2.28
CFR FLOW - MIN: 0.94
CFR FLOW - SEPTAL: 1.91
CFR FLOW - WHOLE HEART: 1.87
CV PHARM DOSE: 47.9 MG
CV STRESS BASE HR: 70 BPM
DIASTOLIC BLOOD PRESSURE: 87 MMHG
EJECTION FRACTION- HIGH: 65 %
END DIASTOLIC INDEX-HIGH: 153 ML/M2
END DIASTOLIC INDEX-LOW: 93 ML/M2
END SYSTOLIC INDEX-HIGH: 71 ML/M2
END SYSTOLIC INDEX-LOW: 31 ML/M2
NUC REST DIASTOLIC VOLUME INDEX: 181
NUC REST EJECTION FRACTION: 55
NUC REST SYSTOLIC VOLUME INDEX: 82
NUC STRESS DIASTOLIC VOLUME INDEX: 189
NUC STRESS EJECTION FRACTION: 56 %
NUC STRESS SYSTOLIC VOLUME INDEX: 84
OHS CV CPX 85 PERCENT MAX PREDICTED HEART RATE MALE: 150
OHS CV CPX MAX PREDICTED HEART RATE: 177
OHS CV CPX PATIENT IS FEMALE: 0
OHS CV CPX PATIENT IS MALE: 1
OHS CV CPX PEAK DIASTOLIC BLOOD PRESSURE: 92 MMHG
OHS CV CPX PEAK HEAR RATE: 71 BPM
OHS CV CPX PEAK RATE PRESSURE PRODUCT: NORMAL
OHS CV CPX PEAK SYSTOLIC BLOOD PRESSURE: 158 MMHG
OHS CV CPX PERCENT MAX PREDICTED HEART RATE ACHIEVED: 40
OHS CV CPX RATE PRESSURE PRODUCT PRESENTING: 8330
OHS CV PHARM TIME: 1300 MIN
PERFUSION DEFECT 1 SIZE IN %: 13 %
REST FLOW - ANTERIOR: 0.72 CC/MIN/G
REST FLOW - INFERIOR: 0.68 CC/MIN/G
REST FLOW - LATERAL: 0.78 CC/MIN/G
REST FLOW - MAX: 0.99 CC/MIN/G
REST FLOW - MIN: 0.37 CC/MIN/G
REST FLOW - SEPTAL: 0.71 CC/MIN/G
REST FLOW - WHOLE HEART: 0.72 CC/MIN/G
RETIRED EF AND QEF - SEE NOTES: 53 %
STRESS FLOW - ANTERIOR: 1.45 CC/MIN/G
STRESS FLOW - INFERIOR: 1.08 CC/MIN/G
STRESS FLOW - LATERAL: 1.55 CC/MIN/G
STRESS FLOW - MAX: 2.01 CC/MIN/G
STRESS FLOW - MIN: 0.52 CC/MIN/G
STRESS FLOW - SEPTAL: 1.36 CC/MIN/G
STRESS FLOW - WHOLE HEART: 1.36 CC/MIN/G
STRESS ST DEPRESSION: 0.5 MM
SYSTOLIC BLOOD PRESSURE: 119 MMHG

## 2021-06-15 PROCEDURE — 78434 AQMBF PET REST & RX STRESS: CPT | Mod: NTX

## 2021-06-15 PROCEDURE — 93018 CV STRESS TEST I&R ONLY: CPT | Mod: TXP,,, | Performed by: INTERNAL MEDICINE

## 2021-06-15 PROCEDURE — 78434 AQMBF PET REST & RX STRESS: CPT | Mod: 26,TXP,, | Performed by: INTERNAL MEDICINE

## 2021-06-15 PROCEDURE — 78431 MYOCRD IMG PET RST&STRS CT: CPT | Mod: 26,TXP,, | Performed by: INTERNAL MEDICINE

## 2021-06-15 PROCEDURE — 93018 PR CARDIAC STRESS TST,INTERP/REPT ONLY: ICD-10-PCS | Mod: TXP,,, | Performed by: INTERNAL MEDICINE

## 2021-06-15 PROCEDURE — 93016 CARDIAC PET SCAN STRESS (CUPID ONLY): ICD-10-PCS | Mod: TXP,,, | Performed by: INTERNAL MEDICINE

## 2021-06-15 PROCEDURE — 63600175 PHARM REV CODE 636 W HCPCS: Mod: TXP | Performed by: INTERNAL MEDICINE

## 2021-06-15 PROCEDURE — 93016 CV STRESS TEST SUPVJ ONLY: CPT | Mod: TXP,,, | Performed by: INTERNAL MEDICINE

## 2021-06-15 PROCEDURE — 78431 PR PET, MYOCARDIAL IMG, PERFUSION, MULT STUDIES, REST/STRESS, W/CONCURRENT CT: ICD-10-PCS | Mod: 26,TXP,, | Performed by: INTERNAL MEDICINE

## 2021-06-15 PROCEDURE — 78434 PR PET, ABS QUANT MYOCARD BLOOD FLOW, REST/STRESS: ICD-10-PCS | Mod: 26,TXP,, | Performed by: INTERNAL MEDICINE

## 2021-06-15 RX ORDER — AMINOPHYLLINE 25 MG/ML
75 INJECTION, SOLUTION INTRAVENOUS ONCE
Status: COMPLETED | OUTPATIENT
Start: 2021-06-15 | End: 2021-06-15

## 2021-06-15 RX ORDER — DIPYRIDAMOLE 5 MG/ML
47.85 INJECTION INTRAVENOUS ONCE
Status: COMPLETED | OUTPATIENT
Start: 2021-06-15 | End: 2021-06-15

## 2021-06-15 RX ADMIN — DIPYRIDAMOLE 47.85 MG: 5 INJECTION INTRAVENOUS at 01:06

## 2021-06-15 RX ADMIN — AMINOPHYLLINE 75 MG: 25 INJECTION, SOLUTION INTRAVENOUS at 01:06

## 2021-06-23 ENCOUNTER — OFFICE VISIT (OUTPATIENT)
Dept: CARDIOLOGY | Facility: CLINIC | Age: 46
End: 2021-06-23
Payer: MEDICARE

## 2021-06-23 ENCOUNTER — LAB VISIT (OUTPATIENT)
Dept: LAB | Facility: HOSPITAL | Age: 46
End: 2021-06-23
Payer: MEDICARE

## 2021-06-23 ENCOUNTER — DOCUMENTATION ONLY (OUTPATIENT)
Dept: CARDIOLOGY | Facility: CLINIC | Age: 46
End: 2021-06-23

## 2021-06-23 VITALS
WEIGHT: 183.88 LBS | SYSTOLIC BLOOD PRESSURE: 153 MMHG | OXYGEN SATURATION: 99 % | DIASTOLIC BLOOD PRESSURE: 95 MMHG | HEIGHT: 68 IN | BODY MASS INDEX: 27.87 KG/M2 | HEART RATE: 70 BPM

## 2021-06-23 DIAGNOSIS — I25.10 CORONARY ARTERY DISEASE INVOLVING NATIVE CORONARY ARTERY OF NATIVE HEART WITHOUT ANGINA PECTORIS: Primary | ICD-10-CM

## 2021-06-23 DIAGNOSIS — I67.89 CEREBRAL MICROVASCULAR DISEASE: ICD-10-CM

## 2021-06-23 DIAGNOSIS — N18.6 END STAGE RENAL FAILURE ON DIALYSIS: Chronic | ICD-10-CM

## 2021-06-23 DIAGNOSIS — N18.9 CHRONIC KIDNEY DISEASE, UNSPECIFIED CKD STAGE: ICD-10-CM

## 2021-06-23 DIAGNOSIS — I25.10 CORONARY ARTERY DISEASE INVOLVING NATIVE HEART, ANGINA PRESENCE UNSPECIFIED, UNSPECIFIED VESSEL OR LESION TYPE: Primary | ICD-10-CM

## 2021-06-23 DIAGNOSIS — I63.19 CEREBROVASCULAR ACCIDENT (CVA) DUE TO EMBOLISM OF OTHER PRECEREBRAL ARTERY: ICD-10-CM

## 2021-06-23 DIAGNOSIS — Z01.810 PREOPERATIVE CARDIOVASCULAR EXAMINATION: ICD-10-CM

## 2021-06-23 DIAGNOSIS — Z99.2 END STAGE RENAL FAILURE ON DIALYSIS: Chronic | ICD-10-CM

## 2021-06-23 DIAGNOSIS — I25.10 CORONARY ARTERY DISEASE INVOLVING NATIVE HEART, ANGINA PRESENCE UNSPECIFIED, UNSPECIFIED VESSEL OR LESION TYPE: ICD-10-CM

## 2021-06-23 LAB
ANION GAP SERPL CALC-SCNC: 13 MMOL/L (ref 8–16)
APTT BLDCRRT: 24.8 SEC (ref 21–32)
BASOPHILS # BLD AUTO: 0.08 K/UL (ref 0–0.2)
BASOPHILS NFR BLD: 1.1 % (ref 0–1.9)
BUN SERPL-MCNC: 20 MG/DL (ref 6–20)
CALCIUM SERPL-MCNC: 9.2 MG/DL (ref 8.7–10.5)
CHLORIDE SERPL-SCNC: 97 MMOL/L (ref 95–110)
CO2 SERPL-SCNC: 32 MMOL/L (ref 23–29)
CREAT SERPL-MCNC: 6.5 MG/DL (ref 0.5–1.4)
DIFFERENTIAL METHOD: ABNORMAL
EOSINOPHIL # BLD AUTO: 0.2 K/UL (ref 0–0.5)
EOSINOPHIL NFR BLD: 2.7 % (ref 0–8)
ERYTHROCYTE [DISTWIDTH] IN BLOOD BY AUTOMATED COUNT: 13.4 % (ref 11.5–14.5)
EST. GFR  (AFRICAN AMERICAN): 11 ML/MIN/1.73 M^2
EST. GFR  (NON AFRICAN AMERICAN): 9.5 ML/MIN/1.73 M^2
GLUCOSE SERPL-MCNC: 118 MG/DL (ref 70–110)
HCT VFR BLD AUTO: 33.4 % (ref 40–54)
HGB BLD-MCNC: 10.6 G/DL (ref 14–18)
IMM GRANULOCYTES # BLD AUTO: 0.03 K/UL (ref 0–0.04)
IMM GRANULOCYTES NFR BLD AUTO: 0.4 % (ref 0–0.5)
INR PPP: 0.9 (ref 0.8–1.2)
LYMPHOCYTES # BLD AUTO: 1.6 K/UL (ref 1–4.8)
LYMPHOCYTES NFR BLD: 21.1 % (ref 18–48)
MCH RBC QN AUTO: 29.9 PG (ref 27–31)
MCHC RBC AUTO-ENTMCNC: 31.7 G/DL (ref 32–36)
MCV RBC AUTO: 94 FL (ref 82–98)
MONOCYTES # BLD AUTO: 0.8 K/UL (ref 0.3–1)
MONOCYTES NFR BLD: 10.4 % (ref 4–15)
NEUTROPHILS # BLD AUTO: 4.8 K/UL (ref 1.8–7.7)
NEUTROPHILS NFR BLD: 64.3 % (ref 38–73)
NRBC BLD-RTO: 0 /100 WBC
PLATELET # BLD AUTO: 362 K/UL (ref 150–450)
PMV BLD AUTO: 9.4 FL (ref 9.2–12.9)
POTASSIUM SERPL-SCNC: 3.5 MMOL/L (ref 3.5–5.1)
PROTHROMBIN TIME: 10.4 SEC (ref 9–12.5)
RBC # BLD AUTO: 3.54 M/UL (ref 4.6–6.2)
SODIUM SERPL-SCNC: 142 MMOL/L (ref 136–145)
WBC # BLD AUTO: 7.39 K/UL (ref 3.9–12.7)

## 2021-06-23 PROCEDURE — 85025 COMPLETE CBC W/AUTO DIFF WBC: CPT | Mod: TXP | Performed by: INTERNAL MEDICINE

## 2021-06-23 PROCEDURE — 1126F AMNT PAIN NOTED NONE PRSNT: CPT | Mod: S$GLB,TXP,, | Performed by: INTERNAL MEDICINE

## 2021-06-23 PROCEDURE — 99214 PR OFFICE/OUTPT VISIT, EST, LEVL IV, 30-39 MIN: ICD-10-PCS | Mod: S$GLB,TXP,, | Performed by: INTERNAL MEDICINE

## 2021-06-23 PROCEDURE — 36415 COLL VENOUS BLD VENIPUNCTURE: CPT | Mod: TXP | Performed by: INTERNAL MEDICINE

## 2021-06-23 PROCEDURE — 99214 OFFICE O/P EST MOD 30 MIN: CPT | Mod: S$GLB,TXP,, | Performed by: INTERNAL MEDICINE

## 2021-06-23 PROCEDURE — 1126F PR PAIN SEVERITY QUANTIFIED, NO PAIN PRESENT: ICD-10-PCS | Mod: S$GLB,TXP,, | Performed by: INTERNAL MEDICINE

## 2021-06-23 PROCEDURE — 99999 PR PBB SHADOW E&M-EST. PATIENT-LVL III: ICD-10-PCS | Mod: PBBFAC,TXP,, | Performed by: INTERNAL MEDICINE

## 2021-06-23 PROCEDURE — 80048 BASIC METABOLIC PNL TOTAL CA: CPT | Mod: TXP | Performed by: INTERNAL MEDICINE

## 2021-06-23 PROCEDURE — 85730 THROMBOPLASTIN TIME PARTIAL: CPT | Mod: TXP | Performed by: INTERNAL MEDICINE

## 2021-06-23 PROCEDURE — 85610 PROTHROMBIN TIME: CPT | Mod: TXP | Performed by: INTERNAL MEDICINE

## 2021-06-23 PROCEDURE — 99999 PR PBB SHADOW E&M-EST. PATIENT-LVL III: CPT | Mod: PBBFAC,TXP,, | Performed by: INTERNAL MEDICINE

## 2021-06-23 PROCEDURE — 3008F PR BODY MASS INDEX (BMI) DOCUMENTED: ICD-10-PCS | Mod: CPTII,S$GLB,TXP, | Performed by: INTERNAL MEDICINE

## 2021-06-23 PROCEDURE — 3008F BODY MASS INDEX DOCD: CPT | Mod: CPTII,S$GLB,TXP, | Performed by: INTERNAL MEDICINE

## 2021-06-23 RX ORDER — DIPHENHYDRAMINE HCL 50 MG
50 CAPSULE ORAL ONCE
Status: CANCELLED | OUTPATIENT
Start: 2021-06-23 | End: 2021-06-23

## 2021-06-23 RX ORDER — SODIUM CHLORIDE 9 MG/ML
INJECTION, SOLUTION INTRAVENOUS CONTINUOUS
Status: CANCELLED | OUTPATIENT
Start: 2021-06-23 | End: 2021-06-23

## 2021-06-30 ENCOUNTER — TELEPHONE (OUTPATIENT)
Dept: TRANSPLANT | Facility: CLINIC | Age: 46
End: 2021-06-30

## 2021-07-01 ENCOUNTER — HOSPITAL ENCOUNTER (OUTPATIENT)
Facility: HOSPITAL | Age: 46
Discharge: HOME | End: 2021-07-01
Attending: INTERNAL MEDICINE | Admitting: INTERNAL MEDICINE
Payer: MEDICARE

## 2021-07-01 VITALS
HEART RATE: 68 BPM | BODY MASS INDEX: 27.89 KG/M2 | TEMPERATURE: 98 F | RESPIRATION RATE: 17 BRPM | HEIGHT: 68 IN | OXYGEN SATURATION: 96 % | WEIGHT: 184 LBS | DIASTOLIC BLOOD PRESSURE: 92 MMHG | SYSTOLIC BLOOD PRESSURE: 160 MMHG

## 2021-07-01 DIAGNOSIS — I25.10 CORONARY ARTERY DISEASE INVOLVING NATIVE HEART, ANGINA PRESENCE UNSPECIFIED, UNSPECIFIED VESSEL OR LESION TYPE: ICD-10-CM

## 2021-07-01 LAB
ABO + RH BLD: NORMAL
BLD GP AB SCN CELLS X3 SERPL QL: NORMAL

## 2021-07-01 PROCEDURE — 25000003 PHARM REV CODE 250: Mod: TXP | Performed by: STUDENT IN AN ORGANIZED HEALTH CARE EDUCATION/TRAINING PROGRAM

## 2021-07-01 PROCEDURE — C1760 CLOSURE DEV, VASC: HCPCS | Mod: TXP | Performed by: INTERNAL MEDICINE

## 2021-07-01 PROCEDURE — 99152 PR MOD CONSCIOUS SEDATION, SAME PHYS, 5+ YRS, FIRST 15 MIN: ICD-10-PCS | Mod: TXP,,, | Performed by: INTERNAL MEDICINE

## 2021-07-01 PROCEDURE — C1769 GUIDE WIRE: HCPCS | Mod: TXP | Performed by: INTERNAL MEDICINE

## 2021-07-01 PROCEDURE — 25500020 PHARM REV CODE 255: Mod: TXP | Performed by: INTERNAL MEDICINE

## 2021-07-01 PROCEDURE — 93458 L HRT ARTERY/VENTRICLE ANGIO: CPT | Mod: 26,TXP,, | Performed by: INTERNAL MEDICINE

## 2021-07-01 PROCEDURE — 93005 ELECTROCARDIOGRAM TRACING: CPT | Mod: TXP

## 2021-07-01 PROCEDURE — 99153 MOD SED SAME PHYS/QHP EA: CPT | Mod: TXP | Performed by: INTERNAL MEDICINE

## 2021-07-01 PROCEDURE — 86900 BLOOD TYPING SEROLOGIC ABO: CPT | Mod: TXP | Performed by: INTERNAL MEDICINE

## 2021-07-01 PROCEDURE — 93458 L HRT ARTERY/VENTRICLE ANGIO: CPT | Mod: TXP | Performed by: INTERNAL MEDICINE

## 2021-07-01 PROCEDURE — 25000003 PHARM REV CODE 250: Mod: TXP | Performed by: INTERNAL MEDICINE

## 2021-07-01 PROCEDURE — 99152 MOD SED SAME PHYS/QHP 5/>YRS: CPT | Mod: TXP | Performed by: INTERNAL MEDICINE

## 2021-07-01 PROCEDURE — 93010 EKG 12-LEAD: ICD-10-PCS | Mod: TXP,,, | Performed by: INTERNAL MEDICINE

## 2021-07-01 PROCEDURE — C1894 INTRO/SHEATH, NON-LASER: HCPCS | Mod: TXP | Performed by: INTERNAL MEDICINE

## 2021-07-01 PROCEDURE — 93010 ELECTROCARDIOGRAM REPORT: CPT | Mod: TXP,,, | Performed by: INTERNAL MEDICINE

## 2021-07-01 PROCEDURE — 99152 MOD SED SAME PHYS/QHP 5/>YRS: CPT | Mod: TXP,,, | Performed by: INTERNAL MEDICINE

## 2021-07-01 PROCEDURE — 93458 PR CATH PLACE/CORON ANGIO, IMG SUPER/INTERP,W LEFT HEART VENTRICULOGRAPHY: ICD-10-PCS | Mod: 26,TXP,, | Performed by: INTERNAL MEDICINE

## 2021-07-01 PROCEDURE — 63600175 PHARM REV CODE 636 W HCPCS: Mod: TXP | Performed by: INTERNAL MEDICINE

## 2021-07-01 RX ORDER — ACETAMINOPHEN 325 MG/1
650 TABLET ORAL EVERY 4 HOURS PRN
Status: DISCONTINUED | OUTPATIENT
Start: 2021-07-01 | End: 2021-07-01 | Stop reason: HOSPADM

## 2021-07-01 RX ORDER — ONDANSETRON 8 MG/1
8 TABLET, ORALLY DISINTEGRATING ORAL EVERY 8 HOURS PRN
Status: DISCONTINUED | OUTPATIENT
Start: 2021-07-01 | End: 2021-07-01 | Stop reason: HOSPADM

## 2021-07-01 RX ORDER — HEPARIN SOD,PORCINE/0.9 % NACL 1000/500ML
INTRAVENOUS SOLUTION INTRAVENOUS
Status: DISCONTINUED | OUTPATIENT
Start: 2021-07-01 | End: 2021-07-01 | Stop reason: HOSPADM

## 2021-07-01 RX ORDER — FENTANYL CITRATE 50 UG/ML
INJECTION, SOLUTION INTRAMUSCULAR; INTRAVENOUS
Status: DISCONTINUED | OUTPATIENT
Start: 2021-07-01 | End: 2021-07-01 | Stop reason: HOSPADM

## 2021-07-01 RX ORDER — LIDOCAINE HYDROCHLORIDE 20 MG/ML
INJECTION, SOLUTION EPIDURAL; INFILTRATION; INTRACAUDAL; PERINEURAL
Status: DISCONTINUED | OUTPATIENT
Start: 2021-07-01 | End: 2021-07-01 | Stop reason: HOSPADM

## 2021-07-01 RX ORDER — DIPHENHYDRAMINE HCL 50 MG
50 CAPSULE ORAL ONCE
Status: COMPLETED | OUTPATIENT
Start: 2021-07-01 | End: 2021-07-01

## 2021-07-01 RX ORDER — MIDAZOLAM HYDROCHLORIDE 1 MG/ML
INJECTION, SOLUTION INTRAMUSCULAR; INTRAVENOUS
Status: DISCONTINUED | OUTPATIENT
Start: 2021-07-01 | End: 2021-07-01 | Stop reason: HOSPADM

## 2021-07-01 RX ORDER — SODIUM CHLORIDE 9 MG/ML
INJECTION, SOLUTION INTRAVENOUS CONTINUOUS
Status: ACTIVE | OUTPATIENT
Start: 2021-07-01 | End: 2021-07-01

## 2021-07-01 RX ORDER — ATORVASTATIN CALCIUM 40 MG/1
40 TABLET, FILM COATED ORAL DAILY
Qty: 90 TABLET | Refills: 3 | Status: ON HOLD | OUTPATIENT
Start: 2021-07-01 | End: 2022-05-19

## 2021-07-01 RX ORDER — NAPROXEN SODIUM 220 MG/1
81 TABLET, FILM COATED ORAL DAILY
Status: DISCONTINUED | OUTPATIENT
Start: 2021-07-01 | End: 2021-07-01 | Stop reason: HOSPADM

## 2021-07-01 RX ORDER — CEFAZOLIN SODIUM 1 G/3ML
INJECTION, POWDER, FOR SOLUTION INTRAMUSCULAR; INTRAVENOUS
Status: DISCONTINUED | OUTPATIENT
Start: 2021-07-01 | End: 2021-07-01 | Stop reason: HOSPADM

## 2021-07-01 RX ORDER — ASPIRIN 81 MG/1
81 TABLET ORAL DAILY
Qty: 30 TABLET | Refills: 11 | Status: ON HOLD | OUTPATIENT
Start: 2021-07-01 | End: 2023-11-29

## 2021-07-01 RX ADMIN — DIPHENHYDRAMINE HYDROCHLORIDE 50 MG: 50 CAPSULE ORAL at 11:07

## 2021-07-01 RX ADMIN — ASPIRIN 81 MG CHEWABLE TABLET 81 MG: 81 TABLET CHEWABLE at 11:07

## 2021-07-01 RX ADMIN — ACETAMINOPHEN 650 MG: 325 TABLET ORAL at 02:07

## 2021-07-15 ENCOUNTER — HOSPITAL ENCOUNTER (OUTPATIENT)
Dept: CARDIOLOGY | Facility: HOSPITAL | Age: 46
Discharge: HOME | End: 2021-07-15
Attending: NURSE PRACTITIONER
Payer: MEDICARE

## 2021-07-15 ENCOUNTER — HOSPITAL ENCOUNTER (OUTPATIENT)
Dept: RADIOLOGY | Facility: HOSPITAL | Age: 46
Discharge: HOME | End: 2021-07-15
Attending: NURSE PRACTITIONER
Payer: MEDICARE

## 2021-07-15 VITALS
DIASTOLIC BLOOD PRESSURE: 86 MMHG | SYSTOLIC BLOOD PRESSURE: 154 MMHG | HEIGHT: 68 IN | WEIGHT: 184 LBS | HEART RATE: 90 BPM | BODY MASS INDEX: 27.89 KG/M2

## 2021-07-15 DIAGNOSIS — Z76.82 ORGAN TRANSPLANT CANDIDATE: ICD-10-CM

## 2021-07-15 DIAGNOSIS — N28.89 RIGHT RENAL MASS: ICD-10-CM

## 2021-07-15 LAB
ASCENDING AORTA: 2.82 CM
AV INDEX (PROSTH): 0.65
AV MEAN GRADIENT: 10 MMHG
AV PEAK GRADIENT: 17 MMHG
AV VALVE AREA: 2.39 CM2
AV VELOCITY RATIO: 0.61
BSA FOR ECHO PROCEDURE: 2 M2
CV ECHO LV RWT: 0.48 CM
DOP CALC AO PEAK VEL: 2.04 M/S
DOP CALC AO VTI: 42.86 CM
DOP CALC LVOT AREA: 3.7 CM2
DOP CALC LVOT DIAMETER: 2.16 CM
DOP CALC LVOT PEAK VEL: 1.24 M/S
DOP CALC LVOT STROKE VOLUME: 102.55 CM3
DOP CALCLVOT PEAK VEL VTI: 28 CM
E WAVE DECELERATION TIME: 234.92 MSEC
E/A RATIO: 1.06
E/E' RATIO: 11.38 M/S
ECHO LV POSTERIOR WALL: 1.28 CM (ref 0.6–1.1)
EJECTION FRACTION: 55 %
FRACTIONAL SHORTENING: 36 % (ref 28–44)
INTERVENTRICULAR SEPTUM: 1.18 CM (ref 0.6–1.1)
LA MAJOR: 5.97 CM
LA MINOR: 6.12 CM
LA WIDTH: 4.04 CM
LEFT ATRIUM SIZE: 4.3 CM
LEFT ATRIUM VOLUME INDEX MOD: 34 ML/M2
LEFT ATRIUM VOLUME INDEX: 45.3 ML/M2
LEFT ATRIUM VOLUME MOD: 66.93 CM3
LEFT ATRIUM VOLUME: 89.25 CM3
LEFT INTERNAL DIMENSION IN SYSTOLE: 3.37 CM (ref 2.1–4)
LEFT VENTRICLE DIASTOLIC VOLUME INDEX: 68.24 ML/M2
LEFT VENTRICLE DIASTOLIC VOLUME: 134.43 ML
LEFT VENTRICLE MASS INDEX: 134 G/M2
LEFT VENTRICLE SYSTOLIC VOLUME INDEX: 23.5 ML/M2
LEFT VENTRICLE SYSTOLIC VOLUME: 46.27 ML
LEFT VENTRICULAR INTERNAL DIMENSION IN DIASTOLE: 5.28 CM (ref 3.5–6)
LEFT VENTRICULAR MASS: 263.92 G
LV LATERAL E/E' RATIO: 9.25 M/S
LV SEPTAL E/E' RATIO: 14.8 M/S
MV A" WAVE DURATION": 10.56 MSEC
MV PEAK A VEL: 0.7 M/S
MV PEAK E VEL: 0.74 M/S
MV STENOSIS PRESSURE HALF TIME: 68.13 MS
MV VALVE AREA P 1/2 METHOD: 3.23 CM2
PISA TR MAX VEL: 2.03 M/S
PULM VEIN S/D RATIO: 0.9
PV PEAK D VEL: 0.5 M/S
PV PEAK S VEL: 0.45 M/S
QEF: 50 %
RA MAJOR: 4.73 CM
RA PRESSURE: 3 MMHG
RA WIDTH: 3.62 CM
RIGHT VENTRICULAR END-DIASTOLIC DIMENSION: 3.61 CM
RV TISSUE DOPPLER FREE WALL SYSTOLIC VELOCITY 1 (APICAL 4 CHAMBER VIEW): 15.56 CM/S
SINUS: 3.38 CM
STJ: 2.24 CM
TDI LATERAL: 0.08 M/S
TDI SEPTAL: 0.05 M/S
TDI: 0.07 M/S
TR MAX PG: 16 MMHG
TRICUSPID ANNULAR PLANE SYSTOLIC EXCURSION: 2.91 CM
TV REST PULMONARY ARTERY PRESSURE: 19 MMHG

## 2021-07-15 PROCEDURE — 74178 CT ABD&PLV WO CNTR FLWD CNTR: CPT | Mod: TC,TXP

## 2021-07-15 PROCEDURE — 25500020 PHARM REV CODE 255: Mod: TXP | Performed by: NURSE PRACTITIONER

## 2021-07-15 PROCEDURE — 74178 CT ABD&PLV WO CNTR FLWD CNTR: CPT | Mod: 26,TXP,, | Performed by: RADIOLOGY

## 2021-07-15 PROCEDURE — 74178 CT ABDOMEN PELVIS W WO CONTRAST: ICD-10-PCS | Mod: 26,TXP,, | Performed by: RADIOLOGY

## 2021-07-15 PROCEDURE — 93306 TTE W/DOPPLER COMPLETE: CPT | Mod: TXP

## 2021-07-15 PROCEDURE — 93306 TTE W/DOPPLER COMPLETE: CPT | Mod: 26,TXP,, | Performed by: INTERNAL MEDICINE

## 2021-07-15 PROCEDURE — 93306 ECHO (CUPID ONLY): ICD-10-PCS | Mod: 26,TXP,, | Performed by: INTERNAL MEDICINE

## 2021-07-15 RX ADMIN — IOHEXOL 75 ML: 350 INJECTION, SOLUTION INTRAVENOUS at 11:07

## 2021-07-16 ENCOUNTER — TELEPHONE (OUTPATIENT)
Dept: TRANSPLANT | Facility: CLINIC | Age: 46
End: 2021-07-16

## 2021-07-16 DIAGNOSIS — Z76.82 ORGAN TRANSPLANT CANDIDATE: ICD-10-CM

## 2021-07-16 DIAGNOSIS — N28.89 RENAL MASS: Primary | ICD-10-CM

## 2021-07-16 RX ORDER — ONDANSETRON 4 MG/1
4 TABLET, FILM COATED ORAL EVERY 6 HOURS PRN
Qty: 2 TABLET | Refills: 0 | Status: ON HOLD | OUTPATIENT
Start: 2021-07-16 | End: 2022-05-19

## 2021-08-03 ENCOUNTER — OFFICE VISIT (OUTPATIENT)
Dept: OPTOMETRY | Facility: CLINIC | Age: 46
End: 2021-08-03
Payer: MEDICARE

## 2021-08-03 DIAGNOSIS — H52.13 MYOPIA OF BOTH EYES: ICD-10-CM

## 2021-08-03 DIAGNOSIS — E11.3553 STABLE PROLIFERATIVE DIABETIC RETINOPATHY OF BOTH EYES ASSOCIATED WITH TYPE 2 DIABETES MELLITUS: Primary | ICD-10-CM

## 2021-08-03 DIAGNOSIS — H25.13 NUCLEAR SCLEROSIS OF BOTH EYES: ICD-10-CM

## 2021-08-03 PROCEDURE — 1159F MED LIST DOCD IN RCRD: CPT | Mod: CPTII,S$GLB,, | Performed by: OPTOMETRIST

## 2021-08-03 PROCEDURE — 99999 PR PBB SHADOW E&M-EST. PATIENT-LVL III: CPT | Mod: PBBFAC,,, | Performed by: OPTOMETRIST

## 2021-08-03 PROCEDURE — 99999 PR PBB SHADOW E&M-EST. PATIENT-LVL III: ICD-10-PCS | Mod: PBBFAC,,, | Performed by: OPTOMETRIST

## 2021-08-03 PROCEDURE — 1126F PR PAIN SEVERITY QUANTIFIED, NO PAIN PRESENT: ICD-10-PCS | Mod: CPTII,S$GLB,, | Performed by: OPTOMETRIST

## 2021-08-03 PROCEDURE — 92004 COMPRE OPH EXAM NEW PT 1/>: CPT | Mod: S$GLB,,, | Performed by: OPTOMETRIST

## 2021-08-03 PROCEDURE — 2022F DILAT RTA XM EVC RTNOPTHY: CPT | Mod: CPTII,S$GLB,, | Performed by: OPTOMETRIST

## 2021-08-03 PROCEDURE — 1126F AMNT PAIN NOTED NONE PRSNT: CPT | Mod: CPTII,S$GLB,, | Performed by: OPTOMETRIST

## 2021-08-03 PROCEDURE — 1159F PR MEDICATION LIST DOCUMENTED IN MEDICAL RECORD: ICD-10-PCS | Mod: CPTII,S$GLB,, | Performed by: OPTOMETRIST

## 2021-08-03 PROCEDURE — 92015 DETERMINE REFRACTIVE STATE: CPT | Mod: S$GLB,,, | Performed by: OPTOMETRIST

## 2021-08-03 PROCEDURE — 92015 PR REFRACTION: ICD-10-PCS | Mod: S$GLB,,, | Performed by: OPTOMETRIST

## 2021-08-03 PROCEDURE — 92004 PR EYE EXAM, NEW PATIENT,COMPREHESV: ICD-10-PCS | Mod: S$GLB,,, | Performed by: OPTOMETRIST

## 2021-08-03 PROCEDURE — 2022F PR DILATED RETINAL EYE EXAM WITH INTERP/REVIEW: ICD-10-PCS | Mod: CPTII,S$GLB,, | Performed by: OPTOMETRIST

## 2021-08-03 RX ORDER — CINACALCET 90 MG/1
90 TABLET, FILM COATED ORAL DAILY
COMMUNITY
Start: 2021-06-11 | End: 2022-11-30

## 2021-08-03 RX ORDER — SUCROFERRIC OXYHYDROXIDE 500 MG/1
2 TABLET, CHEWABLE ORAL
Status: ON HOLD | COMMUNITY
Start: 2019-10-30 | End: 2022-06-14

## 2021-08-03 RX ORDER — SEVELAMER CARBONATE 800 MG/1
TABLET, FILM COATED ORAL
Status: ON HOLD | COMMUNITY
Start: 2021-03-11 | End: 2022-06-14 | Stop reason: HOSPADM

## 2021-08-03 RX ORDER — CINACALCET 30 MG/1
90 TABLET, FILM COATED ORAL DAILY
Status: ON HOLD | COMMUNITY
Start: 2021-05-28 | End: 2022-06-14

## 2021-08-03 RX ORDER — ETHYL CHLORIDE 100 %
AEROSOL, SPRAY (ML) TOPICAL
COMMUNITY
Start: 2021-07-09 | End: 2022-10-20

## 2021-08-03 RX ORDER — HEPARIN SODIUM 1000 [USP'U]/ML
INJECTION, SOLUTION INTRAVENOUS; SUBCUTANEOUS
COMMUNITY
Start: 2020-09-30 | End: 2021-09-29

## 2021-08-05 ENCOUNTER — OFFICE VISIT (OUTPATIENT)
Dept: TRANSPLANT | Facility: CLINIC | Age: 46
End: 2021-08-05
Attending: NURSE PRACTITIONER
Payer: MEDICARE

## 2021-08-05 VITALS
RESPIRATION RATE: 17 BRPM | OXYGEN SATURATION: 100 % | TEMPERATURE: 98 F | BODY MASS INDEX: 27.3 KG/M2 | SYSTOLIC BLOOD PRESSURE: 186 MMHG | HEART RATE: 72 BPM | HEIGHT: 68 IN | WEIGHT: 180.13 LBS | DIASTOLIC BLOOD PRESSURE: 88 MMHG

## 2021-08-05 DIAGNOSIS — E11.22 CONTROLLED TYPE 2 DIABETES MELLITUS WITH CHRONIC KIDNEY DISEASE ON CHRONIC DIALYSIS, WITHOUT LONG-TERM CURRENT USE OF INSULIN: Chronic | ICD-10-CM

## 2021-08-05 DIAGNOSIS — E78.2 MIXED HYPERLIPIDEMIA: ICD-10-CM

## 2021-08-05 DIAGNOSIS — N18.6 CONTROLLED TYPE 2 DIABETES MELLITUS WITH CHRONIC KIDNEY DISEASE ON CHRONIC DIALYSIS, WITHOUT LONG-TERM CURRENT USE OF INSULIN: Chronic | ICD-10-CM

## 2021-08-05 DIAGNOSIS — Z99.2 CONTROLLED TYPE 2 DIABETES MELLITUS WITH CHRONIC KIDNEY DISEASE ON CHRONIC DIALYSIS, WITHOUT LONG-TERM CURRENT USE OF INSULIN: Chronic | ICD-10-CM

## 2021-08-05 DIAGNOSIS — I12.9 RENAL HYPERTENSION: Chronic | ICD-10-CM

## 2021-08-05 DIAGNOSIS — Z99.2 END STAGE RENAL FAILURE ON DIALYSIS: Chronic | ICD-10-CM

## 2021-08-05 DIAGNOSIS — N25.81 HYPERPARATHYROIDISM, SECONDARY RENAL: ICD-10-CM

## 2021-08-05 DIAGNOSIS — D50.0 IRON DEFICIENCY ANEMIA DUE TO CHRONIC BLOOD LOSS: ICD-10-CM

## 2021-08-05 DIAGNOSIS — Z76.82 PATIENT ON WAITING LIST FOR KIDNEY TRANSPLANT: Primary | Chronic | ICD-10-CM

## 2021-08-05 DIAGNOSIS — N18.6 END STAGE RENAL FAILURE ON DIALYSIS: Chronic | ICD-10-CM

## 2021-08-05 DIAGNOSIS — I25.10 CORONARY ARTERY DISEASE INVOLVING NATIVE CORONARY ARTERY OF NATIVE HEART WITHOUT ANGINA PECTORIS: ICD-10-CM

## 2021-08-05 PROCEDURE — 1126F PR PAIN SEVERITY QUANTIFIED, NO PAIN PRESENT: ICD-10-PCS | Mod: CPTII,S$GLB,TXP, | Performed by: NURSE PRACTITIONER

## 2021-08-05 PROCEDURE — 3008F BODY MASS INDEX DOCD: CPT | Mod: CPTII,S$GLB,TXP, | Performed by: NURSE PRACTITIONER

## 2021-08-05 PROCEDURE — 3077F PR MOST RECENT SYSTOLIC BLOOD PRESSURE >= 140 MM HG: ICD-10-PCS | Mod: CPTII,S$GLB,TXP, | Performed by: NURSE PRACTITIONER

## 2021-08-05 PROCEDURE — 3079F PR MOST RECENT DIASTOLIC BLOOD PRESSURE 80-89 MM HG: ICD-10-PCS | Mod: CPTII,S$GLB,TXP, | Performed by: NURSE PRACTITIONER

## 2021-08-05 PROCEDURE — 99999 PR PBB SHADOW E&M-EST. PATIENT-LVL IV: ICD-10-PCS | Mod: PBBFAC,TXP,, | Performed by: NURSE PRACTITIONER

## 2021-08-05 PROCEDURE — 99999 PR PBB SHADOW E&M-EST. PATIENT-LVL IV: CPT | Mod: PBBFAC,TXP,, | Performed by: NURSE PRACTITIONER

## 2021-08-05 PROCEDURE — 1159F MED LIST DOCD IN RCRD: CPT | Mod: CPTII,S$GLB,TXP, | Performed by: NURSE PRACTITIONER

## 2021-08-05 PROCEDURE — 1126F AMNT PAIN NOTED NONE PRSNT: CPT | Mod: CPTII,S$GLB,TXP, | Performed by: NURSE PRACTITIONER

## 2021-08-05 PROCEDURE — 99215 PR OFFICE/OUTPT VISIT, EST, LEVL V, 40-54 MIN: ICD-10-PCS | Mod: S$GLB,TXP,, | Performed by: NURSE PRACTITIONER

## 2021-08-05 PROCEDURE — 3077F SYST BP >= 140 MM HG: CPT | Mod: CPTII,S$GLB,TXP, | Performed by: NURSE PRACTITIONER

## 2021-08-05 PROCEDURE — 3079F DIAST BP 80-89 MM HG: CPT | Mod: CPTII,S$GLB,TXP, | Performed by: NURSE PRACTITIONER

## 2021-08-05 PROCEDURE — 99215 OFFICE O/P EST HI 40 MIN: CPT | Mod: S$GLB,TXP,, | Performed by: NURSE PRACTITIONER

## 2021-08-05 PROCEDURE — 1159F PR MEDICATION LIST DOCUMENTED IN MEDICAL RECORD: ICD-10-PCS | Mod: CPTII,S$GLB,TXP, | Performed by: NURSE PRACTITIONER

## 2021-08-05 PROCEDURE — 3008F PR BODY MASS INDEX (BMI) DOCUMENTED: ICD-10-PCS | Mod: CPTII,S$GLB,TXP, | Performed by: NURSE PRACTITIONER

## 2021-08-11 ENCOUNTER — HOSPITAL ENCOUNTER (EMERGENCY)
Facility: HOSPITAL | Age: 46
Discharge: HOME | End: 2021-08-11
Attending: EMERGENCY MEDICINE
Payer: MEDICARE

## 2021-08-11 VITALS
BODY MASS INDEX: 27.28 KG/M2 | SYSTOLIC BLOOD PRESSURE: 161 MMHG | OXYGEN SATURATION: 98 % | HEIGHT: 68 IN | WEIGHT: 180 LBS | DIASTOLIC BLOOD PRESSURE: 81 MMHG | HEART RATE: 76 BPM | RESPIRATION RATE: 18 BRPM | TEMPERATURE: 99 F

## 2021-08-11 DIAGNOSIS — U07.1 COVID-19: Primary | ICD-10-CM

## 2021-08-11 LAB
CTP QC/QA: YES
HCV AB SERPL QL IA: NEGATIVE
HIV 1+2 AB+HIV1 P24 AG SERPL QL IA: NEGATIVE
SARS-COV-2 RDRP RESP QL NAA+PROBE: POSITIVE

## 2021-08-11 PROCEDURE — 86803 HEPATITIS C AB TEST: CPT | Mod: NTX | Performed by: EMERGENCY MEDICINE

## 2021-08-11 PROCEDURE — U0002 COVID-19 LAB TEST NON-CDC: HCPCS | Mod: NTX | Performed by: EMERGENCY MEDICINE

## 2021-08-11 PROCEDURE — 87389 HIV-1 AG W/HIV-1&-2 AB AG IA: CPT | Mod: NTX | Performed by: EMERGENCY MEDICINE

## 2021-08-11 PROCEDURE — 25000003 PHARM REV CODE 250: Mod: NTX | Performed by: PHYSICIAN ASSISTANT

## 2021-08-11 PROCEDURE — M0243 CASIRIVI AND IMDEVI INFUSION: HCPCS | Performed by: PHYSICIAN ASSISTANT

## 2021-08-11 PROCEDURE — 99284 PR EMERGENCY DEPT VISIT,LEVEL IV: ICD-10-PCS | Mod: CR,NTX,, | Performed by: PHYSICIAN ASSISTANT

## 2021-08-11 PROCEDURE — 99283 EMERGENCY DEPT VISIT LOW MDM: CPT | Mod: 25,NTX

## 2021-08-11 PROCEDURE — 63600175 PHARM REV CODE 636 W HCPCS: Mod: NTX | Performed by: PHYSICIAN ASSISTANT

## 2021-08-11 PROCEDURE — 99284 EMERGENCY DEPT VISIT MOD MDM: CPT | Mod: CR,NTX,, | Performed by: PHYSICIAN ASSISTANT

## 2021-08-11 PROCEDURE — 96365 THER/PROPH/DIAG IV INF INIT: CPT | Mod: NTX

## 2021-08-11 RX ORDER — ACETAMINOPHEN 325 MG/1
650 TABLET ORAL ONCE AS NEEDED
Status: DISCONTINUED | OUTPATIENT
Start: 2021-08-11 | End: 2021-08-11 | Stop reason: HOSPADM

## 2021-08-11 RX ORDER — SODIUM CHLORIDE 0.9 % (FLUSH) 0.9 %
10 SYRINGE (ML) INJECTION
Status: DISCONTINUED | OUTPATIENT
Start: 2021-08-11 | End: 2021-08-11 | Stop reason: HOSPADM

## 2021-08-11 RX ORDER — ALBUTEROL SULFATE 90 UG/1
2 AEROSOL, METERED RESPIRATORY (INHALATION)
Status: DISCONTINUED | OUTPATIENT
Start: 2021-08-11 | End: 2021-08-11 | Stop reason: HOSPADM

## 2021-08-11 RX ORDER — ONDANSETRON 4 MG/1
4 TABLET, ORALLY DISINTEGRATING ORAL ONCE AS NEEDED
Status: DISCONTINUED | OUTPATIENT
Start: 2021-08-11 | End: 2021-08-11 | Stop reason: HOSPADM

## 2021-08-11 RX ORDER — DIPHENHYDRAMINE HYDROCHLORIDE 50 MG/ML
25 INJECTION INTRAMUSCULAR; INTRAVENOUS ONCE AS NEEDED
Status: DISCONTINUED | OUTPATIENT
Start: 2021-08-11 | End: 2021-08-11 | Stop reason: HOSPADM

## 2021-08-11 RX ORDER — EPINEPHRINE 0.3 MG/.3ML
0.3 INJECTION SUBCUTANEOUS
Status: DISCONTINUED | OUTPATIENT
Start: 2021-08-11 | End: 2021-08-11 | Stop reason: HOSPADM

## 2021-08-11 RX ADMIN — CASIRIVIMAB AND IMDEVIMAB 600 MG: 600; 600 INJECTION, SOLUTION, CONCENTRATE INTRAVENOUS at 01:08

## 2021-08-13 ENCOUNTER — TELEPHONE (OUTPATIENT)
Dept: ADMINISTRATIVE | Facility: OTHER | Age: 46
End: 2021-08-13

## 2021-08-13 ENCOUNTER — NURSE TRIAGE (OUTPATIENT)
Dept: ADMINISTRATIVE | Facility: CLINIC | Age: 46
End: 2021-08-13

## 2021-08-24 ENCOUNTER — TELEPHONE (OUTPATIENT)
Dept: TRANSPLANT | Facility: CLINIC | Age: 46
End: 2021-08-24

## 2021-08-24 ENCOUNTER — OFFICE VISIT (OUTPATIENT)
Dept: OPHTHALMOLOGY | Facility: CLINIC | Age: 46
End: 2021-08-24
Payer: MEDICARE

## 2021-08-24 DIAGNOSIS — H35.033 HYPERTENSIVE RETINOPATHY, BILATERAL: ICD-10-CM

## 2021-08-24 DIAGNOSIS — N28.89 RENAL MASS: Primary | ICD-10-CM

## 2021-08-24 DIAGNOSIS — E11.3513 CONTROLLED TYPE 2 DIABETES MELLITUS WITH BOTH EYES AFFECTED BY PROLIFERATIVE RETINOPATHY AND MACULAR EDEMA, WITHOUT LONG-TERM CURRENT USE OF INSULIN: Primary | ICD-10-CM

## 2021-08-24 DIAGNOSIS — Z76.82 ORGAN TRANSPLANT CANDIDATE: ICD-10-CM

## 2021-08-24 PROCEDURE — 1126F AMNT PAIN NOTED NONE PRSNT: CPT | Mod: CPTII,S$GLB,, | Performed by: OPHTHALMOLOGY

## 2021-08-24 PROCEDURE — 1126F PR PAIN SEVERITY QUANTIFIED, NO PAIN PRESENT: ICD-10-PCS | Mod: CPTII,S$GLB,, | Performed by: OPHTHALMOLOGY

## 2021-08-24 PROCEDURE — 92134 POSTERIOR SEGMENT OCT RETINA (OCULAR COHERENCE TOMOGRAPHY)-BOTH EYES: ICD-10-PCS | Mod: S$GLB,,, | Performed by: OPHTHALMOLOGY

## 2021-08-24 PROCEDURE — 92004 PR EYE EXAM, NEW PATIENT,COMPREHESV: ICD-10-PCS | Mod: S$GLB,,, | Performed by: OPHTHALMOLOGY

## 2021-08-24 PROCEDURE — 92201 PR OPHTHALMOSCOPY, EXT, W/RET DRAW/SCLERAL DEPR, I&R, UNI/BI: ICD-10-PCS | Mod: S$GLB,,, | Performed by: OPHTHALMOLOGY

## 2021-08-24 PROCEDURE — 1160F PR REVIEW ALL MEDS BY PRESCRIBER/CLIN PHARMACIST DOCUMENTED: ICD-10-PCS | Mod: CPTII,S$GLB,, | Performed by: OPHTHALMOLOGY

## 2021-08-24 PROCEDURE — 99999 PR PBB SHADOW E&M-EST. PATIENT-LVL III: CPT | Mod: PBBFAC,,, | Performed by: OPHTHALMOLOGY

## 2021-08-24 PROCEDURE — 92201 OPSCPY EXTND RTA DRAW UNI/BI: CPT | Mod: S$GLB,,, | Performed by: OPHTHALMOLOGY

## 2021-08-24 PROCEDURE — 1159F PR MEDICATION LIST DOCUMENTED IN MEDICAL RECORD: ICD-10-PCS | Mod: CPTII,S$GLB,, | Performed by: OPHTHALMOLOGY

## 2021-08-24 PROCEDURE — 1160F RVW MEDS BY RX/DR IN RCRD: CPT | Mod: CPTII,S$GLB,, | Performed by: OPHTHALMOLOGY

## 2021-08-24 PROCEDURE — 92134 CPTRZ OPH DX IMG PST SGM RTA: CPT | Mod: S$GLB,,, | Performed by: OPHTHALMOLOGY

## 2021-08-24 PROCEDURE — 99999 PR PBB SHADOW E&M-EST. PATIENT-LVL III: ICD-10-PCS | Mod: PBBFAC,,, | Performed by: OPHTHALMOLOGY

## 2021-08-24 PROCEDURE — 92004 COMPRE OPH EXAM NEW PT 1/>: CPT | Mod: S$GLB,,, | Performed by: OPHTHALMOLOGY

## 2021-08-24 PROCEDURE — 1159F MED LIST DOCD IN RCRD: CPT | Mod: CPTII,S$GLB,, | Performed by: OPHTHALMOLOGY

## 2021-08-24 NOTE — Clinical Note
Catheter is inserted into the ostium   right coronary artery. Angiography performed of the right coronary arteries in multiple views.[facility] 4F JR4 Subjective:     Patient ID: Joycelyn Lorenzana is a 54 y o  female  [facility] Clinical Progress Notes      Specialized Services Documentation  Therapist must complete separate progress note for each specific clinical activity in which the individual participated during the day  Group Psychotherapy   [id number] Joycelyn Conti Deputy  actively shared in group focused on understanding the stages of change  April engaged in a minor analysis of Rehab by Justine Johnston and Grow as you Go by Jet Males and discussed the stage of change relevant to each song  April stated that she learned the difference between linear and cyclical change and stated learning about the upward spiral as a takeaway  Good effort noted toward treatment goal  Continue AT to encourage the development, understanding and education on stages of change  Tx Plan Objective: 1 1,1 2,1 4, Therapist:  Kasia GALAN; GLADIS Mackenzie      Education Therapy   [id number] Joycelyn Conti Lanett actively shared in morning assessment and goal review  Presented as Receptive related to readiness to learn  Joycelyn Conti Lanett did complete goal from last treatment day identifying gaining responsibility and hope  April  did not present with any barriers to learning  [id number] April Sushila Varghese engaged throughout the treatment day  Was engaged in learning related to Illness, Medication, Aftercare and Wellness Tools  Staff utilized Verbal, Written, A/V and Demonstration teaching methods    April Sushila Castaneday shared area of learning and set a goal for outside of program to have a successful meeting with  and to write in her gratitude journal       Tx Plan Objective: 1 1,1 2,1 4, Therapist:  VANESSA Rose; GLADIS Mackenzie

## 2021-08-25 ENCOUNTER — NURSE TRIAGE (OUTPATIENT)
Dept: ADMINISTRATIVE | Facility: CLINIC | Age: 46
End: 2021-08-25

## 2021-11-01 ENCOUNTER — SOCIAL WORK (OUTPATIENT)
Dept: TRANSPLANT | Facility: CLINIC | Age: 46
End: 2021-11-01

## 2021-11-29 ENCOUNTER — SOCIAL WORK (OUTPATIENT)
Dept: TRANSPLANT | Facility: CLINIC | Age: 46
End: 2021-11-29

## 2021-11-30 DIAGNOSIS — N28.89 RENAL MASS: Primary | ICD-10-CM

## 2021-11-30 DIAGNOSIS — Z76.82 ORGAN TRANSPLANT CANDIDATE: ICD-10-CM

## 2021-12-01 ENCOUNTER — TELEPHONE (OUTPATIENT)
Dept: TRANSPLANT | Facility: CLINIC | Age: 46
End: 2021-12-01

## 2021-12-06 ENCOUNTER — SOCIAL WORK (OUTPATIENT)
Dept: TRANSPLANT | Facility: CLINIC | Age: 46
End: 2021-12-06

## 2021-12-15 DIAGNOSIS — Z76.82 PRE-KIDNEY TRANSPLANT, LISTED: Primary | ICD-10-CM

## 2021-12-27 ENCOUNTER — TELEPHONE (OUTPATIENT)
Dept: TRANSPLANT | Facility: CLINIC | Age: 46
End: 2021-12-27

## 2022-01-04 ENCOUNTER — HOSPITAL ENCOUNTER (OUTPATIENT)
Dept: RADIOLOGY | Facility: HOSPITAL | Age: 47
Discharge: HOME | End: 2022-01-04
Attending: NURSE PRACTITIONER
Payer: MEDICARE

## 2022-01-04 ENCOUNTER — TELEPHONE (OUTPATIENT)
Dept: TRANSPLANT | Facility: CLINIC | Age: 47
End: 2022-01-04
Payer: MEDICARE

## 2022-01-04 ENCOUNTER — OFFICE VISIT (OUTPATIENT)
Dept: UROLOGY | Facility: CLINIC | Age: 47
End: 2022-01-04
Payer: MEDICARE

## 2022-01-04 VITALS
WEIGHT: 180 LBS | HEART RATE: 68 BPM | HEIGHT: 68 IN | SYSTOLIC BLOOD PRESSURE: 160 MMHG | DIASTOLIC BLOOD PRESSURE: 93 MMHG | BODY MASS INDEX: 27.28 KG/M2

## 2022-01-04 DIAGNOSIS — Z76.82 ORGAN TRANSPLANT CANDIDATE: ICD-10-CM

## 2022-01-04 DIAGNOSIS — N18.6 END STAGE RENAL FAILURE ON DIALYSIS: ICD-10-CM

## 2022-01-04 DIAGNOSIS — N28.89 RENAL MASS: Primary | ICD-10-CM

## 2022-01-04 DIAGNOSIS — N28.1 RENAL CYST: ICD-10-CM

## 2022-01-04 DIAGNOSIS — N28.89 RENAL MASS: ICD-10-CM

## 2022-01-04 DIAGNOSIS — R16.0 HYPODENSE MASS OF LIVER: Primary | ICD-10-CM

## 2022-01-04 DIAGNOSIS — Z99.2 END STAGE RENAL FAILURE ON DIALYSIS: ICD-10-CM

## 2022-01-04 PROCEDURE — 99999 PR PBB SHADOW E&M-EST. PATIENT-LVL IV: CPT | Mod: PBBFAC,TXP,, | Performed by: UROLOGY

## 2022-01-04 PROCEDURE — 74178 CT ABD&PLV WO CNTR FLWD CNTR: CPT | Mod: 26,TXP,, | Performed by: RADIOLOGY

## 2022-01-04 PROCEDURE — 99204 PR OFFICE/OUTPT VISIT, NEW, LEVL IV, 45-59 MIN: ICD-10-PCS | Mod: S$PBB,TXP,, | Performed by: UROLOGY

## 2022-01-04 PROCEDURE — 74178 CT ABDOMEN PELVIS W WO CONTRAST: ICD-10-PCS | Mod: 26,TXP,, | Performed by: RADIOLOGY

## 2022-01-04 PROCEDURE — 99204 OFFICE O/P NEW MOD 45 MIN: CPT | Mod: S$PBB,TXP,, | Performed by: UROLOGY

## 2022-01-04 PROCEDURE — 74178 CT ABD&PLV WO CNTR FLWD CNTR: CPT | Mod: TC,TXP

## 2022-01-04 PROCEDURE — 99999 PR PBB SHADOW E&M-EST. PATIENT-LVL IV: ICD-10-PCS | Mod: PBBFAC,TXP,, | Performed by: UROLOGY

## 2022-01-04 PROCEDURE — 25500020 PHARM REV CODE 255: Mod: TXP | Performed by: NURSE PRACTITIONER

## 2022-01-04 RX ORDER — LOSARTAN POTASSIUM 100 MG/1
100 TABLET ORAL DAILY
COMMUNITY
Start: 2021-12-01 | End: 2022-12-01

## 2022-01-04 RX ADMIN — IOHEXOL 100 ML: 350 INJECTION, SOLUTION INTRAVENOUS at 11:01

## 2022-01-04 NOTE — TELEPHONE ENCOUNTER
----- Message from Mihcelle Jeter NP sent at 1/4/2022  2:17 PM CST -----  Lets do abd US then we can go from there.  Thanks  ----- Message -----  From: Miguelina Baum  Sent: 1/4/2022  12:57 PM CST  To: Michelle Jeter NP    When I go to orders there is:    Ultrasound of the Liver with doppler (AKA ultrasound)    Is this one correct? The other options are abdominal u/s.    ----- Message -----  From: Michelle Jeter NP  Sent: 1/4/2022  12:31 PM CST  To: Kidney Waitlisted Coordinator    Hepatic US needed

## 2022-01-04 NOTE — PROGRESS NOTES
Subjective:       Patient ID: Haroldo Dickinson Sr. is a 46 y.o. male.    Chief Complaint:  Renal cysts, eval for transplant.      History of Present Illness  HPI  Patient is a 46 y.o. male who is new to our clinic and referred by their transplant team, Michelle Jeter NP for evaluation of renal cysts.  Patient has ESRD 2/2 HTN.  Does HD, MWF via a right forearm AVF.  Had incomplete evaluation on prior CT scan due to nausea and underwent a repeat CT scan today.  Here to evaluate cysts and transplant candidacy from  perspective.  He does not urinate at all.   No FH of kidney cancer.        Review of Systems  Review of Systems  All other systems reviewed and negative except pertinent positives noted in HPI.       Objective:     Physical Exam  Constitutional:       General: He is not in acute distress.     Appearance: He is well-developed and well-nourished.   HENT:      Head: Normocephalic and atraumatic.   Eyes:      General: No scleral icterus.  Neck:      Trachea: No tracheal deviation.   Pulmonary:      Effort: Pulmonary effort is normal. No respiratory distress.   Neurological:      Mental Status: He is alert and oriented to person, place, and time.   Psychiatric:         Mood and Affect: Mood and affect normal.         Behavior: Behavior normal.         Thought Content: Thought content normal.         Judgment: Judgment normal.         Lab Review  Lab Results   Component Value Date    COLORU Yellow 12/15/2013    SPECGRAV 1.010 12/15/2013    PHUR 6.0 12/15/2013    NITRITE Negative 12/15/2013    KETONESU Negative 12/15/2013    UROBILINOGEN Negative 12/15/2013         Assessment:        1. End stage renal failure on dialysis    2. Renal cyst            Plan:     End stage renal failure on dialysis    Renal cyst    -continue HD  -complex cysts of kidney but none worrisome for malignancy at this time.  Recommend continued f/u but ok from  perspective for transplant without any urologic intervention.   -plan for MRI  abdomen without contrast in 6 months with f/u after in  clinic.

## 2022-01-25 ENCOUNTER — TELEPHONE (OUTPATIENT)
Dept: TRANSPLANT | Facility: CLINIC | Age: 47
End: 2022-01-25
Payer: MEDICARE

## 2022-01-25 NOTE — TELEPHONE ENCOUNTER
Explained that we are awaiting reauthorization from insurance for 2022. Once received, he will be rescheduled for his appointments.

## 2022-01-25 NOTE — TELEPHONE ENCOUNTER
----- Message from Andrew Robbins sent at 1/25/2022 12:34 PM CST -----  Regarding: call back  Pt call to speak Miguelina in regards to his health insurance all was verified     Call

## 2022-02-07 ENCOUNTER — TELEPHONE (OUTPATIENT)
Dept: TRANSPLANT | Facility: CLINIC | Age: 47
End: 2022-02-07
Payer: MEDICARE

## 2022-02-24 ENCOUNTER — HOSPITAL ENCOUNTER (OUTPATIENT)
Dept: RADIOLOGY | Facility: HOSPITAL | Age: 47
Discharge: HOME | End: 2022-02-24
Attending: NURSE PRACTITIONER
Payer: MEDICARE

## 2022-02-24 DIAGNOSIS — R16.0 HYPODENSE MASS OF LIVER: ICD-10-CM

## 2022-02-24 DIAGNOSIS — Z76.82 ORGAN TRANSPLANT CANDIDATE: ICD-10-CM

## 2022-02-24 PROCEDURE — 76700 US ABDOMEN COMPLETE: ICD-10-PCS | Mod: 26,TXP,, | Performed by: RADIOLOGY

## 2022-02-24 PROCEDURE — 76700 US EXAM ABDOM COMPLETE: CPT | Mod: 26,TXP,, | Performed by: RADIOLOGY

## 2022-02-24 PROCEDURE — 76700 US EXAM ABDOM COMPLETE: CPT | Mod: TC,TXP

## 2022-03-08 ENCOUNTER — OFFICE VISIT (OUTPATIENT)
Dept: UROLOGY | Facility: CLINIC | Age: 47
End: 2022-03-08
Payer: MEDICARE

## 2022-03-08 ENCOUNTER — HOSPITAL ENCOUNTER (OUTPATIENT)
Dept: RADIOLOGY | Facility: HOSPITAL | Age: 47
Discharge: HOME | End: 2022-03-08
Attending: UROLOGY
Payer: MEDICARE

## 2022-03-08 DIAGNOSIS — N28.89 RENAL MASS: ICD-10-CM

## 2022-03-08 DIAGNOSIS — N28.89 RENAL MASS: Primary | ICD-10-CM

## 2022-03-08 PROCEDURE — 4010F ACE/ARB THERAPY RXD/TAKEN: CPT | Mod: CPTII,S$GLB,TXP, | Performed by: UROLOGY

## 2022-03-08 PROCEDURE — 1159F PR MEDICATION LIST DOCUMENTED IN MEDICAL RECORD: ICD-10-PCS | Mod: CPTII,S$GLB,TXP, | Performed by: UROLOGY

## 2022-03-08 PROCEDURE — 4010F PR ACE/ARB THEARPY RXD/TAKEN: ICD-10-PCS | Mod: CPTII,S$GLB,TXP, | Performed by: UROLOGY

## 2022-03-08 PROCEDURE — 71046 XR CHEST PA AND LATERAL: ICD-10-PCS | Mod: 26,TXP,, | Performed by: RADIOLOGY

## 2022-03-08 PROCEDURE — 1160F RVW MEDS BY RX/DR IN RCRD: CPT | Mod: CPTII,S$GLB,TXP, | Performed by: UROLOGY

## 2022-03-08 PROCEDURE — 1159F MED LIST DOCD IN RCRD: CPT | Mod: CPTII,S$GLB,TXP, | Performed by: UROLOGY

## 2022-03-08 PROCEDURE — 99999 PR PBB SHADOW E&M-EST. PATIENT-LVL III: ICD-10-PCS | Mod: PBBFAC,TXP,, | Performed by: UROLOGY

## 2022-03-08 PROCEDURE — 99214 OFFICE O/P EST MOD 30 MIN: CPT | Mod: S$GLB,TXP,, | Performed by: UROLOGY

## 2022-03-08 PROCEDURE — 71046 X-RAY EXAM CHEST 2 VIEWS: CPT | Mod: TC,TXP

## 2022-03-08 PROCEDURE — 71046 X-RAY EXAM CHEST 2 VIEWS: CPT | Mod: 26,TXP,, | Performed by: RADIOLOGY

## 2022-03-08 PROCEDURE — 1160F PR REVIEW ALL MEDS BY PRESCRIBER/CLIN PHARMACIST DOCUMENTED: ICD-10-PCS | Mod: CPTII,S$GLB,TXP, | Performed by: UROLOGY

## 2022-03-08 PROCEDURE — 99214 PR OFFICE/OUTPT VISIT, EST, LEVL IV, 30-39 MIN: ICD-10-PCS | Mod: S$GLB,TXP,, | Performed by: UROLOGY

## 2022-03-08 PROCEDURE — 99999 PR PBB SHADOW E&M-EST. PATIENT-LVL III: CPT | Mod: PBBFAC,TXP,, | Performed by: UROLOGY

## 2022-03-08 RX ORDER — HYDRALAZINE HYDROCHLORIDE 25 MG/1
25 TABLET, FILM COATED ORAL 2 TIMES DAILY
Status: ON HOLD | COMMUNITY
Start: 2022-01-11 | End: 2022-06-14

## 2022-03-08 NOTE — PROGRESS NOTES
Subjective:       Patient ID: Haroldo Dickinson Sr. is a 46 y.o. male.    Chief Complaint:  Renal cysts, eval for transplant.      History of Present Illness  HPI  Patient is a 46 y.o. male who is established to our clinic and referred by their transplant team, Michelle Jeter NP for evaluation of renal cysts.  Patient has ESRD 2/2 HTN.  Does HD, MWF via a right forearm AVF.  Had incomplete evaluation on prior CT scan due to nausea and underwent a repeat CT which by the report showed no concerning lesions.  With further evaluation by the transplant team, an abdominal ultrasound showed a solid right renal mass and a questionable left renal mass.  Here to re-evaluate cysts vs mass and transplant candidacy from  perspective.  He does not urinate at all.   No FH of kidney cancer.      He had a CVA in 2013, reportedly due to uncontrolled high BP.   BP still not under good control. Denies prior abdominal surgery. No blood thinners.       Review of Systems  Review of Systems  All other systems reviewed and negative except pertinent positives noted in HPI.       Objective:     Physical Exam  Constitutional:       General: He is not in acute distress.     Appearance: He is well-developed.   HENT:      Head: Normocephalic and atraumatic.   Eyes:      General: No scleral icterus.  Neck:      Trachea: No tracheal deviation.   Pulmonary:      Effort: Pulmonary effort is normal. No respiratory distress.   Neurological:      Mental Status: He is alert and oriented to person, place, and time.   Psychiatric:         Behavior: Behavior normal.         Thought Content: Thought content normal.         Judgment: Judgment normal.         Lab Review  Lab Results   Component Value Date    COLORU Yellow 12/15/2013    SPECGRAV 1.010 12/15/2013    PHUR 6.0 12/15/2013    NITRITE Negative 12/15/2013    KETONESU Negative 12/15/2013    UROBILINOGEN Negative 12/15/2013         Assessment:        1. Renal mass            Plan:     Renal mass  -      X-Ray Chest PA And Lateral; Future; Expected date: 03/08/2022    -continue HD  -cxr  -Ultrasound was independently reviewed today and reveals right renal mass concerning for solid mass.   Left kidney with an area of abnormality but not as convincing of mass as right side.  Will need attention/follow-up imaging .  Will also see what pathology of the right kidney is.     - Long talk about renal mass and it's management.  Reviewed images.Discussed options including active surveillance, biopsy, minimally invasive techniques including HFRA, cryo. Discussed open and laparascopic surgical approaches. Discussed partial and radical nephrectomy. Discussed surgery preparation, surgery, recuperation, recovery, exercise restrictions. Discussed risks, benefits, and complications. Answered questions and addressed their concerns.    --I have explained the risk, benefits, and alternatives of the procedure in detail.  The risks including but not limited to bleeding, injury to adjacent structures including the spleen, liver, lung, pancreas, colon and intestines, blood vessels in the abdomen including the renal artery or renal vein, or need for conversion to open nephrectomy were explained to the patient in depth. The patient voices understanding and all questions have been answered. The patient agrees to proceed as planned with a right robotic nephrectomy

## 2022-03-09 ENCOUNTER — TELEPHONE (OUTPATIENT)
Dept: UROLOGY | Facility: CLINIC | Age: 47
End: 2022-03-09
Payer: MEDICARE

## 2022-03-09 NOTE — TELEPHONE ENCOUNTER
1513-I spoke to Haroldo and gave him the message below.  Haroldo MONAE.  He is waiting for surgery to be scheduled in mid/late April 2022 for renal mass    ----- Message from Aidan Hendricks MD sent at 3/8/2022  5:20 PM CST -----  Chest x-ray was normal

## 2022-03-10 ENCOUNTER — OFFICE VISIT (OUTPATIENT)
Dept: TRANSPLANT | Facility: CLINIC | Age: 47
End: 2022-03-10
Payer: MEDICARE

## 2022-03-10 DIAGNOSIS — Z76.82 PATIENT ON WAITING LIST FOR KIDNEY TRANSPLANT: Primary | Chronic | ICD-10-CM

## 2022-03-10 PROCEDURE — 1159F PR MEDICATION LIST DOCUMENTED IN MEDICAL RECORD: ICD-10-PCS | Mod: CPTII,S$GLB,TXP, | Performed by: NURSE PRACTITIONER

## 2022-03-10 PROCEDURE — 4010F ACE/ARB THERAPY RXD/TAKEN: CPT | Mod: CPTII,S$GLB,TXP, | Performed by: NURSE PRACTITIONER

## 2022-03-10 PROCEDURE — 1160F RVW MEDS BY RX/DR IN RCRD: CPT | Mod: CPTII,S$GLB,TXP, | Performed by: NURSE PRACTITIONER

## 2022-03-10 PROCEDURE — 4010F PR ACE/ARB THEARPY RXD/TAKEN: ICD-10-PCS | Mod: CPTII,S$GLB,TXP, | Performed by: NURSE PRACTITIONER

## 2022-03-10 PROCEDURE — 99499 UNLISTED E&M SERVICE: CPT | Mod: S$GLB,TXP,, | Performed by: NURSE PRACTITIONER

## 2022-03-10 PROCEDURE — 1159F MED LIST DOCD IN RCRD: CPT | Mod: CPTII,S$GLB,TXP, | Performed by: NURSE PRACTITIONER

## 2022-03-10 PROCEDURE — 99499 NO LOS: ICD-10-PCS | Mod: S$GLB,TXP,, | Performed by: NURSE PRACTITIONER

## 2022-03-10 PROCEDURE — 99999 PR PBB SHADOW E&M-EST. PATIENT-LVL III: ICD-10-PCS | Mod: PBBFAC,TXP,,

## 2022-03-10 PROCEDURE — 99999 PR PBB SHADOW E&M-EST. PATIENT-LVL III: CPT | Mod: PBBFAC,TXP,,

## 2022-03-10 PROCEDURE — 1160F PR REVIEW ALL MEDS BY PRESCRIBER/CLIN PHARMACIST DOCUMENTED: ICD-10-PCS | Mod: CPTII,S$GLB,TXP, | Performed by: NURSE PRACTITIONER

## 2022-03-10 NOTE — PROGRESS NOTES
Transplant Recipient Adult Psychosocial Assessment Update (Last assessment completed on 2021)    SW completed assessment update with patient's caregiver.  Pt presented with caregiver (Geeta Garcia/friend).    Haroldo Dickinson  [address]  [place] [address]  Home  [phone] (home) Pt's mother: Gabby Dickinson  Work  [phone] (work)  E-mail  No e-mail address on record  Pt's cell: [phone]    Sex: male  YOB: 1975  Age: 46 y.o.    Encounter Date: 3/10/2022  [place] Citizen: yes  Primary Language: English   Needed: no    Emergency Contact:  Name: Gabby Dickinson   Relationship: mother, 59 yo   Address: [address] [place] [address]    Phone Numbers: [phone] (mobile)    Family/Social Support:   Number of dependents/: None  Marital history: pt is  since .  Pt has no significant other at this time.  Other family dynamics:  Pt's son is 18 yo (Haroldo Iniguez) resides with his mother.  Pt reports father is .  Pt has one sister and two brothers (Ariadne Dickinson, sister age 41, drives [phone] & Hamilton Dickinson age 40, brother, drives [phone]) who live locally but are not available to provide support to pt for transplant.      Household Composition:  Pt currently resides alone.    Do you and your caregivers have access to reliable transportation? yes pt reports he uses [employer] transportation.  Pt's friend (Geeta Garcia) also provides transportation to MD visits and grocery store, etc.    PRIMARY CAREGIVER:  Geeta Garcia, 42 yr old (Friend for past 30 yrs).   phone number [phone].      Caregiver and patient states understanding all aspects of caregiver role/commitment.  Caregiver and patient states is able/willing/committed to being caregiver to the fullest extent necessary.  provided in-depth information to patient and caregiver regarding pre- and post-transplant caregiver role.  Patient and  caregiver verbalizes understanding of the education provided today.   strongly encourages patient and caregiver to have concrete plan regarding post-transplant care giving, including back-up caregiver(s) to ensure care giving needs are met as needed.  Patient and caregiver verbalizes understanding of caregiver responsibilities.   remains available. Patient and caregiver agree to contact  in a timely manner if concerns arise.    Able to take time off work without financial concerns: Geeta reported that she is able to take time off of work to care for pt.      Additional Significant Others who will Assist with Transplant:  Pt's mother may assist as caregiver, but does not drive.  Mom: Gabby Dickinson, 60 yr: [phone]    Living Will: no  Healthcare Power of : no Pt reports trusting mother with medical decisions as needed   Advance Directives on file: <<no information> per medical record.  Verbally reviewed LW/HCPA information.   provided patient with copy of LW/HCPA documents and provided education on completion of forms    Living Donors: No.    Highest Education Level: High School (9-12) or GED  Reading Ability: Pt had CVA in past, but reported that only deficit in vision.  Reports difficulty with: Pt reported only deficit is vision due to hx of CVA.  Learns Best By:  Pt reported that he will need to be read to and hands on.  Pt's caregiver (Geeta) reported that she will be available to assist pt with med mngt as needed due to vision issues.     Status: no  VA Benefits: no     Working for Income: No  If no, reason not working: Disability - Prior to disability, pt reports working as an [employer] .    Spouse/Significant Other Employment: N/A    Disabled: yes: date disability began: 2013, due to: ESRD. and CVA.      Monthly Income:   Disability: $960.00  Able to afford all costs now and if transplanted, including medications: yes  Patient  verbalizes understanding of personal responsibilities related to transplant costs and the importance of having a financial plan to ensure that patients transplant costs are fully covered.   provided fundraising information/education.  Patient verbalizes understanding.   remains available.    Insurance:     Payer/Plan Subscr  Sex Relation Sub. Ins. ID Effective Group Num   1. [employer] MANAGE* TOMMIE VARGAS* 1975 Male Self [id number]                                   [address] [address]     Primary Insurance (for UNOS reporting): Public Insurance - Medicare & Choice - [employer] Managed Medicare  Secondary Insurance (for UNOS reporting): None   Pt reported that he has medicaid as well.    Patient verbalizes clear understanding that patient may experience difficulty obtaining and/or be denied insurance coverage post-surgery. This includes and is not limited to disability insurance, life insurance, health insurance, burial insurance, long term care insurance, and other insurances.  Patient also reports understanding that future health concerns related to or unrelated to transplantation may not be covered by patient's insurance.  Resources and information provided and reviewed.      Patient provides verbal permission to release any necessary information to outside resources for patient care and discharge planning.  Resources and information provided are reviewed.      Dialysis Adherence: Patient reports receiving hemodialysis in-center at [facility] and maintaining full adherence to HD treatment regimen.  Pt reports using Medicaid transportation service to make all HD treatments and appointments. Geeta (caregiver) reported that she has been also assisted pt, as needed, with transportation     From prior  visit:  WILIAN spoke with AKASH CEDENO to complete dialysis adherence check and to discuss pt's psychosocial concerns.  AKASH CEDENO confirms pt utilizes [employer] transportation and RTA services  "and maintains good HD treatment attendance.  WILIAN informed AKASH CEDENO that pt will need to identify alternative transportation plan for transplant.  WILIAN also discussed benefit of having pt applied for Medicaid.  AKASH CEDNEO stated plans of following up with pt about Medicaid application.  AKASH CEDENO did report pt is "not the best at taking medicine" due to memory deficit.  AKASH CEDENO reports certain lab values remain high due to pt's inconsistency with taking medication.  AKASH CEDENO denies any additional concerns regarding pt's dialysis adherence at this time.      WILIAN faxed dialysis adherence form to patient's dialysis center and will await returned form to complete dialysis adherence check.    Infusion Service: patient utilizing? no  Home Health: patient utilizing? no  DME: yes - Uses a cane for ambulation. Pt also as www to use as needed.   Pulmonary/Cardiac Rehab: no    ADLS:  Pt does not drive.  Pt had a new CVA in November 2013 resulting in difficulties with vision, gait, balance, and cognition.    Pt reported that he is living indep and is indep with ADLs at this time.  Pt reported no issues with memory or comprehension at this time.    Adherence:    Adherence education and counseling provided. Pt states current and expected compliance with all healthcare recommendations.    Per History Section:  Past Medical History:   Diagnosis Date    CAD (coronary artery disease), native coronary artery 11/21/2019    Cataract     Diabetes mellitus     Diabetic retinopathy     Dialysis patient     DM type 2 causing renal disease, not at goal     ESRD (end stage renal disease) started dialysis 01/2014 6/5/2014    Hyperparathyroidism, secondary renal 6/5/2014    Hypertension     NSTEMI (non-ST elevated myocardial infarction) 12/21/2013    Organ transplant candidate 6/5/2014    Pneumonia     Renal hypertension     Stroke      Social History     Tobacco Use    Smoking status: Never Smoker    Smokeless tobacco: Never Used   Substance Use " Topics    Alcohol use: No     Social History     Substance and Sexual Activity   Drug Use No     Social History     Substance and Sexual Activity   Sexual Activity Yes    Partners: Female    Birth control/protection: None       Per Today's Psychosocial:  Tobacco: none, patient denies any use.  Alcohol: Pt reported occasional use of etoh.  1-2 drinks on holidays.  Illicit Drugs/Non-prescribed Medications: none, patient denies any use.    Patient states clear understanding of the potential impact of substance use as it relates to transplant candidacy and is aware of possible random substance screening.  Substance abstinence/cessation counseling, education and resources provided and reviewed.     Arrests/DWI/Treatment/Rehab: patient denies    Psychiatric History:    Mental Health: Pt denies history of anxiety, depression and or overwhelming feelings of sadness at this time or in the past.   Psychiatrist/Counselor: Pt denies currently or in the past and reports willingness to meet with psychiatry if required by transplant.   Medications:  Pt denies utilizing mental health medications currently or in the past  Suicide/Homicide Issues: Pt denies feelings of wanting to harm self or other currently or in the past  Safety at home: Pt reports feeling safe at home free from abuse/neglect, no hx of trauma per pt.     Knowledge: Patient states having clear understanding and realistic expectations regarding the potential risks and potential benefits of organ transplantation and organ donation and agrees to discuss with health care team members and support system members, as well as to utilize available resources and express questions and/or concerns in order to further facilitate the pt informed decision-making.  Resources and information provided and reviewed.     Patient is aware of [facility]'s affiliation and/or partnership with agencies in home health care, LTAC, SNF, [facility], and other hospitals and clinics.    Understanding:  Patient reports having a clear understanding of the many lifetime commitments involved with being a transplant recipient, including costs, compliance, medications, lab work, procedures, appointments, concrete and financial planning, preparedness, timely and appropriate communication of concerns, abstinence (ETOH, tobacco, illicit non-prescribed drugs), adherence to all health care team recommendations, support system and caregiver involvement, appropriate and timely resource utilization and follow-through, mental health counseling as needed/recommended, and patient and caregiver responsibilities.  Social Service Handbook, resources and detailed educational information provided and reviewed.  Educational information provided.    Patient also reports current and expected compliance with health care regime, and patient states having a clear understanding of the importance of compliance.  Patient reports a clear understanding that risks and benefits may be involved with organ transplantation and with organ donation.  Patient also reports clear understanding that psychosocial risk factors may affect patient, and include but are not limited to feelings of depression, generalized anxiety, anxiety regarding dependence on others, post traumatic stress disorder, feelings of guilt and other emotional and/or mental concerns, and/or exacerbation of existing mental health concerns.  Detailed resources provided and discussed.  Patient agrees to access appropriate resources in a timely manner as needed and/or as recommended, and to communicate concerns appropriately.  Patient also reports a clear understanding of treatment options available.      Patient and caregiver received education in a group setting.   reviewed education, provided additional information, and answered questions.    Feelings or Concerns: Pt expresses motivation to pursuing transplant and denies any transplant related concerns at this time.      Coping:    Current and Pre-Transplant:  watching tv, sitting outside, and fishing.  Pt previously reported attending [employer]; however, pt reports discontinuing attendance since start of COVID-19 pandemic.  At the time of Surgery: TV, sitting outside, prayer  Post Transplant Recovery Period: TV, sitting outside, prayer, and resume normal activities as able.    Goals: Pt reports post transplant goal of discontinuing dialysis and living a healthier life.     Interview Behavior: Patient presents as alert and oriented x 4, pleasant, good eye contact, well groomed, concentration/judgement good, average intelligence, calm, communicative, cooperative and asking and answering questions appropriately.  Pt presented with Geeta Garcia (caregiver) who presented involved and supportive of pt.         Transplant Social Work - Candidacy  Assessment/Plan:     Psychosocial Suitability: Based on psychosocial risk factors, patient presents as a moderate risk candidate for kidney transplant at this time due to having supportive caregiver in place.  Pt has hx of CVA resulting in cognitive/physical deficits/memory deficits affecting pt's medication compliance reported by dialysis SW.    Recommendations/Additional Comments: SW also recommends caregivers be present for all future appointments.  SW strongly recommends that pt conduct fundraising to assist pt with pay for cost of medications, food, gas, and other transplant related needs.  SW recommends that pt remain aware of potential mental health concerns and contact the team if any concerns arise.  SW recommends that pt remain abstinent from tobacco, ETOH, and drug use.  SW supports pt's continued adherence. SW remains available to answer any questions or concerns that arise as the pt moves through the transplant process.     Purvi Ford, [facility]

## 2022-03-10 NOTE — LETTER
Date: 3/10/2022          Listed Patient      To: Dialysis Unit  and Charge RN From: [facility] Social Workers and      Kidney Transplant Nurse Coordinators    RE: Haroldo Dickinson Sr., 1975, [id number]     At [facility], we conduct adherence checks as an important part of transplant care. Initial and listed patient assessments are not complete without adherence information.        Please complete the following information:     Current Dry Weight: ___________         Most Recent Pre-Treatment Weight: ___________ /date: _________                    Data from the last 3 months:  (data from last 3 months preferred):    Number of AMAs with dates, time, and reasons: ____________________________________________________    ______________________________________________________________________________________________    ______________________________________________________________________________________________    Number of No-Shows with dates and reasons: ______________________________________________________      ______________________________________________________________________________________________    Last intact PTH:  ___________/date: __________      Any concerns with Labs:  YES / NO      If yes, please explain:  ___________________________________________________________________________    ______________________________________________________________________________________________    Any concerns with Caregivers:  YES / NO    If yes, please explain:  ___________________________________________________________________________    ______________________________________________________________________________________________     Any concerns with Transportation:  YES / NO    If yes, please explain:   ___________________________________________________________________________    ______________________________________________________________________________________________    Any Psychiatric and/or Psychosocial concerns:  YES / NO     If yes, please explain: ___________________________________________________________________________    ______________________________________________________________________________________________      PLEASE RETURN TO: FAX: [phone]     Thank you for collaborating with us in the care of this patient.           [address]  ?  [place] [address]  ?  phone [phone]  ?  fax [phone]  ?  [email or web address]  Confidentiality notice: The accompanying facsimile is intended solely for the use of the recipient designated above. Document(s) transmitted herewith may contain information that is confidential and privileged. Delivery, distribution or dissemination of this communication other than to the intended recipient is strictly prohibited. If you have received this facsimile in error, please notify us immediately by telephone.

## 2022-03-15 ENCOUNTER — TELEPHONE (OUTPATIENT)
Dept: TRANSPLANT | Facility: CLINIC | Age: 47
End: 2022-03-15
Payer: MEDICARE

## 2022-03-15 NOTE — TELEPHONE ENCOUNTER
Dialysis Adherence:    SW received a faxed adherence form from 's dialysis center indicates over last three months that the patient has had 2 AMAs (late arrivals on 2/25/22 and 2/18/22) and has No no-shows.      Any concerns with caregivers: no     Any concerns with transportation: yes    Any Psychiatric and/or Psychosocial concerns: no

## 2022-03-23 ENCOUNTER — TELEPHONE (OUTPATIENT)
Dept: UROLOGY | Facility: CLINIC | Age: 47
End: 2022-03-23
Payer: MEDICARE

## 2022-03-23 DIAGNOSIS — N28.89 RENAL MASS: Primary | ICD-10-CM

## 2022-03-28 DIAGNOSIS — R16.0 HYPODENSE MASS OF LIVER: Primary | ICD-10-CM

## 2022-03-28 DIAGNOSIS — Z76.82 ORGAN TRANSPLANT CANDIDATE: ICD-10-CM

## 2022-04-08 NOTE — Clinical Note
The wire is removed from the  Right Radial. Taltz Counseling: I discussed with the patient the risks of ixekizumab including but not limited to immunosuppression, serious infections, worsening of inflammatory bowel disease and drug reactions.  The patient understands that monitoring is required including a PPD at baseline and must alert us or the primary physician if symptoms of infection or other concerning signs are noted.

## 2022-04-12 NOTE — ANESTHESIA PAT ROS NOTE
04/12/2022  Haroldo Dickinson Sr. is a 46 y.o., male.      Pre-op Assessment          Review of Systems  Anesthesia Hx:  History of prior surgery of interest to airway management or planning: Previous anesthesia: MAC  2/10/2021: COLONOSCOPY with MAC.  Procedure performed at an [facility]. Denies Family Hx of Anesthesia complications.   Denies Personal Hx of Anesthesia complications.   Social:  Non-Smoker, Social Alcohol Use    Hematology/Oncology:         -- Anemia:   EENT/Dental:   Eyes: Visual Impairment Has Bilateral and S/P Extraction - Bilateral Catarract   Cardiovascular:   Hypertension Past MI CAD    Denies Angina. hyperlipidemia GRADE 1 DD Functional Capacity 3 METS, USES CANE  Coronary Artery Disease:  hx of myocardial infarction (MI), hx of Percutaneous Coronary Intervention (PCI). Hx of Myocardial Infarction, MI was > 1 year ago, NSTEMI, NSTEMI date of 2019 NSTEMI WITH PCI X 1 Valvular Heart Disease: Mitral Regurgitation (MR), mild, Tricuspid Regurgitation (TR), mild   Congestive Heart Failure (CHF) , LV Diastolic HF Carotoid Artery Disease, bilateral , Left stenosis is 1-39% , Right stenosis is  1-39%   Pulmonary:   Denies Shortness of breath.  Denies Recent URI.    Renal/:   RENAL MASS    ON KIDNEY TRANSPLANT LIST Kidney Function/Disease, Chronic Kidney Disease (CKD) , CKD Stage V (ESRD) (GFR <15 or on Dialysis) , contributing etiologies of Polycystic Kidney Disease. ON HEMODIALYSIS (M-W-F) RIGHT FOREARM AVF    Musculoskeletal:  Musculoskeletal Normal    Neurological:   CVA  CVA - Cerebrovasular Accident , Most recent CVA was on 2013 , has had 1 stroke , residual deficits are hemiparesis - right.    Endocrine:  Diabetes, Type 2 Diabetes , Complications include Diabetic Retinopathy , controlled by diet.  Parathyroid Disease, Hyperparathyroidism    Dermatological:  Skin Normal     Psych:  Psychiatric Normal           Physical Exam  General:  Well nourished      Airway/Jaw/Neck:  Airway Findings: Mouth Opening: Normal   Tongue: Normal   Jaw/Neck Findings: Neck ROM: Normal ROM       Dental:  Dental Findings: In tact     Chest/Lungs:  Chest/Lungs Findings: Clear to auscultation      Heart/Vascular:  Heart Findings: Rate: Normal  Rhythm: Regular Rhythm  Sounds: Normal        Mental Status:  Mental Status Findings:  Cooperative, Alert and Oriented         Anesthesia Assessment: Preoperative EQUATION    Planned Procedure: Procedure(s) (LRB):  XI ROBOTIC NEPHRECTOMY (Right)  Requested Anesthesia Type:General/Regional  Surgeon: Aidan Hendricks MD  Service: Urology  Known or anticipated Date of Surgery:5/19/2022    Surgeon notes: reviewed    Previous anesthesia records:GETA and No problems   2/2021 Colonoscopy    Last PCP note: > 1 year ago   Subspecialty notes: Cardiology: IR, Kidney Transplant, Urology    Other important co-morbidities:Per Epic: DM2, HTN and Hx NSTEMI, ESRD (HD dialysis MWF), CAD, Hyperparathyroidism      Tests already available:  No recent tests.        Optimization:  Anesthesia Preop Clinic Assessment  Indicated.    Medical Opinion Indicated.           Plan:    Testing:  A1C, CMP, EKG, Hematology Profile and T&S   Pre-anesthesia  visit       Visit focus: concerns in complex and/or prolonged anesthesia, position other than supine     Consultation:IM Perioperative Hospitalist     Patient  has previously scheduled Medical Appointment: 5/10/2022    Navigation: Tests Scheduled.              Consults scheduled.             Results will be tracked by Preop Clinic.    5/12/2022:   Patient is optimized     Jenn De Anda MD  Internal Medicine  [facility]   Cell Phone- [phone]

## 2022-04-18 ENCOUNTER — TELEPHONE (OUTPATIENT)
Dept: PREADMISSION TESTING | Facility: HOSPITAL | Age: 47
End: 2022-04-18
Payer: MEDICARE

## 2022-04-18 NOTE — TELEPHONE ENCOUNTER
----- Message from Gianna Field RN sent at 4/12/2022  2:23 PM CDT -----  Surgery date: 5/19/2022  PreOp appt:  5/10/2022    Please call Pt and schedule the following preop appts:    Arthur  POC  Lab  EKG    Thank you!  Gianna

## 2022-04-21 ENCOUNTER — TELEPHONE (OUTPATIENT)
Dept: PREADMISSION TESTING | Facility: HOSPITAL | Age: 47
End: 2022-04-21
Payer: MEDICARE

## 2022-05-02 PROBLEM — I25.10 CORONARY ARTERY DISEASE INVOLVING NATIVE HEART: Status: RESOLVED | Noted: 2021-07-01 | Resolved: 2022-05-02

## 2022-05-02 PROBLEM — R94.39 ABNORMAL CARDIOVASCULAR STRESS TEST: Status: RESOLVED | Noted: 2019-04-09 | Resolved: 2022-05-02

## 2022-05-12 ENCOUNTER — HOSPITAL ENCOUNTER (OUTPATIENT)
Dept: PREADMISSION TESTING | Facility: HOSPITAL | Age: 47
Discharge: HOME | End: 2022-05-12
Attending: UROLOGY
Payer: MEDICARE

## 2022-05-12 ENCOUNTER — HOSPITAL ENCOUNTER (OUTPATIENT)
Dept: CARDIOLOGY | Facility: CLINIC | Age: 47
Discharge: HOME | End: 2022-05-12
Payer: MEDICARE

## 2022-05-12 ENCOUNTER — OFFICE VISIT (OUTPATIENT)
Dept: INTERNAL MEDICINE | Facility: CLINIC | Age: 47
End: 2022-05-12
Payer: MEDICARE

## 2022-05-12 VITALS
WEIGHT: 170 LBS | HEIGHT: 68 IN | RESPIRATION RATE: 16 BRPM | OXYGEN SATURATION: 98 % | TEMPERATURE: 98 F | BODY MASS INDEX: 25.76 KG/M2 | HEART RATE: 58 BPM | DIASTOLIC BLOOD PRESSURE: 87 MMHG | SYSTOLIC BLOOD PRESSURE: 129 MMHG

## 2022-05-12 DIAGNOSIS — I10 ESSENTIAL HYPERTENSION: ICD-10-CM

## 2022-05-12 DIAGNOSIS — I69.398 ABNORMALITY OF GAIT FOLLOWING CEREBROVASCULAR ACCIDENT (CVA): ICD-10-CM

## 2022-05-12 DIAGNOSIS — Z99.2 END STAGE RENAL FAILURE ON DIALYSIS: Chronic | ICD-10-CM

## 2022-05-12 DIAGNOSIS — Z01.818 PREOP EXAMINATION: Primary | ICD-10-CM

## 2022-05-12 DIAGNOSIS — Z01.818 PREOPERATIVE TESTING: ICD-10-CM

## 2022-05-12 DIAGNOSIS — I77.0 ARTERIOVENOUS FISTULA: ICD-10-CM

## 2022-05-12 DIAGNOSIS — N18.6 CONTROLLED TYPE 2 DIABETES MELLITUS WITH CHRONIC KIDNEY DISEASE ON CHRONIC DIALYSIS, WITHOUT LONG-TERM CURRENT USE OF INSULIN: Chronic | ICD-10-CM

## 2022-05-12 DIAGNOSIS — Z98.61 POST PTCA: ICD-10-CM

## 2022-05-12 DIAGNOSIS — E78.2 MIXED HYPERLIPIDEMIA: ICD-10-CM

## 2022-05-12 DIAGNOSIS — H35.033 HYPERTENSIVE RETINOPATHY, BILATERAL: ICD-10-CM

## 2022-05-12 DIAGNOSIS — E11.22 CONTROLLED TYPE 2 DIABETES MELLITUS WITH CHRONIC KIDNEY DISEASE ON CHRONIC DIALYSIS, WITHOUT LONG-TERM CURRENT USE OF INSULIN: Chronic | ICD-10-CM

## 2022-05-12 DIAGNOSIS — Z99.2 CONTROLLED TYPE 2 DIABETES MELLITUS WITH CHRONIC KIDNEY DISEASE ON CHRONIC DIALYSIS, WITHOUT LONG-TERM CURRENT USE OF INSULIN: Chronic | ICD-10-CM

## 2022-05-12 DIAGNOSIS — I83.893 VARICOSE VEINS OF BILATERAL LOWER EXTREMITIES WITH OTHER COMPLICATIONS: ICD-10-CM

## 2022-05-12 DIAGNOSIS — N18.6 END STAGE RENAL FAILURE ON DIALYSIS: Chronic | ICD-10-CM

## 2022-05-12 DIAGNOSIS — R26.9 ABNORMALITY OF GAIT FOLLOWING CEREBROVASCULAR ACCIDENT (CVA): ICD-10-CM

## 2022-05-12 DIAGNOSIS — I25.10 CORONARY ARTERY DISEASE INVOLVING NATIVE CORONARY ARTERY OF NATIVE HEART WITHOUT ANGINA PECTORIS: ICD-10-CM

## 2022-05-12 DIAGNOSIS — I69.351 HEMIPARESIS AFFECTING RIGHT SIDE AS LATE EFFECT OF CEREBROVASCULAR ACCIDENT: ICD-10-CM

## 2022-05-12 PROBLEM — O22.00 VARICOSE VEINS DURING PREGNANCY, ANTEPARTUM: Status: RESOLVED | Noted: 2022-05-12 | Resolved: 2022-05-12

## 2022-05-12 PROBLEM — O22.00 VARICOSE VEINS DURING PREGNANCY, ANTEPARTUM: Status: ACTIVE | Noted: 2022-05-12

## 2022-05-12 PROBLEM — T82.868A THROMBOSIS OF ARTERIOVENOUS FISTULA: Status: RESOLVED | Noted: 2019-02-18 | Resolved: 2022-05-12

## 2022-05-12 PROCEDURE — 93005 EKG 12-LEAD: ICD-10-PCS | Mod: NTX,S$GLB,, | Performed by: ANESTHESIOLOGY

## 2022-05-12 PROCEDURE — 93010 ELECTROCARDIOGRAM REPORT: CPT | Mod: NTX,S$GLB,, | Performed by: INTERNAL MEDICINE

## 2022-05-12 PROCEDURE — 3074F SYST BP LT 130 MM HG: CPT | Mod: CPTII,NTX,S$GLB, | Performed by: HOSPITALIST

## 2022-05-12 PROCEDURE — 93005 ELECTROCARDIOGRAM TRACING: CPT | Mod: NTX,S$GLB,, | Performed by: ANESTHESIOLOGY

## 2022-05-12 PROCEDURE — 3008F BODY MASS INDEX DOCD: CPT | Mod: CPTII,NTX,S$GLB, | Performed by: HOSPITALIST

## 2022-05-12 PROCEDURE — 3074F PR MOST RECENT SYSTOLIC BLOOD PRESSURE < 130 MM HG: ICD-10-PCS | Mod: CPTII,NTX,S$GLB, | Performed by: HOSPITALIST

## 2022-05-12 PROCEDURE — 93010 EKG 12-LEAD: ICD-10-PCS | Mod: NTX,S$GLB,, | Performed by: INTERNAL MEDICINE

## 2022-05-12 PROCEDURE — 3079F PR MOST RECENT DIASTOLIC BLOOD PRESSURE 80-89 MM HG: ICD-10-PCS | Mod: CPTII,NTX,S$GLB, | Performed by: HOSPITALIST

## 2022-05-12 PROCEDURE — 3008F PR BODY MASS INDEX (BMI) DOCUMENTED: ICD-10-PCS | Mod: CPTII,NTX,S$GLB, | Performed by: HOSPITALIST

## 2022-05-12 PROCEDURE — 99214 PR OFFICE/OUTPT VISIT, EST, LEVL IV, 30-39 MIN: ICD-10-PCS | Mod: NTX,S$GLB,, | Performed by: HOSPITALIST

## 2022-05-12 PROCEDURE — 3044F HG A1C LEVEL LT 7.0%: CPT | Mod: CPTII,NTX,S$GLB, | Performed by: HOSPITALIST

## 2022-05-12 PROCEDURE — 4010F ACE/ARB THERAPY RXD/TAKEN: CPT | Mod: CPTII,NTX,S$GLB, | Performed by: HOSPITALIST

## 2022-05-12 PROCEDURE — 3079F DIAST BP 80-89 MM HG: CPT | Mod: CPTII,NTX,S$GLB, | Performed by: HOSPITALIST

## 2022-05-12 PROCEDURE — 99999 PR PBB SHADOW E&M-EST. PATIENT-LVL III: CPT | Mod: PBBFAC,TXP,, | Performed by: HOSPITALIST

## 2022-05-12 PROCEDURE — 1159F PR MEDICATION LIST DOCUMENTED IN MEDICAL RECORD: ICD-10-PCS | Mod: CPTII,NTX,S$GLB, | Performed by: HOSPITALIST

## 2022-05-12 PROCEDURE — 99999 PR PBB SHADOW E&M-EST. PATIENT-LVL III: ICD-10-PCS | Mod: PBBFAC,TXP,, | Performed by: HOSPITALIST

## 2022-05-12 PROCEDURE — 1160F PR REVIEW ALL MEDS BY PRESCRIBER/CLIN PHARMACIST DOCUMENTED: ICD-10-PCS | Mod: CPTII,NTX,S$GLB, | Performed by: HOSPITALIST

## 2022-05-12 PROCEDURE — 1159F MED LIST DOCD IN RCRD: CPT | Mod: CPTII,NTX,S$GLB, | Performed by: HOSPITALIST

## 2022-05-12 PROCEDURE — 3044F PR MOST RECENT HEMOGLOBIN A1C LEVEL <7.0%: ICD-10-PCS | Mod: CPTII,NTX,S$GLB, | Performed by: HOSPITALIST

## 2022-05-12 PROCEDURE — 1160F RVW MEDS BY RX/DR IN RCRD: CPT | Mod: CPTII,NTX,S$GLB, | Performed by: HOSPITALIST

## 2022-05-12 PROCEDURE — 99214 OFFICE O/P EST MOD 30 MIN: CPT | Mod: NTX,S$GLB,, | Performed by: HOSPITALIST

## 2022-05-12 PROCEDURE — 4010F PR ACE/ARB THEARPY RXD/TAKEN: ICD-10-PCS | Mod: CPTII,NTX,S$GLB, | Performed by: HOSPITALIST

## 2022-05-12 NOTE — HPI
History of present illness- I had the pleasure of meeting this pleasant 46 y.o. gentleman in the pre op clinic prior to his elective Urological surgery. The patient is new to me .   Called his mother and had her phone during the consultation process  I have obtained the history by speaking to the patient and by reviewing the electronic health records.    Events leading up to surgery / History of presenting illness -    Renal mass  I have obtained the history by speaking to the patient  I also reviewed the chart  He is on hemodialysis and is getting workup done for a kidney transplantation  As a part of a kidney transplantation workup he had imaging studies that incidentally showed a right renal mass  This is not causing him any pain.  He does not have any abdominal pain    Upon questioning him further he had blood in the urine about a month ago  He produces urine about once a month  He does not have any history of kidney stones or history suggestive of kidney stones  No inflammation of the kidney history  No pain urinating      Relevant health conditions of significance for the perioperative period/ History of presenting illness -    Subjectively describes health as good     Health conditions of significance for the perioperative period       -coronary artery disease  -coronary angioplasty  -stroke  -hypertension  -diabetes  -Hemodialysis    He lives by himself in a ground level apartment complex   He has help available after surgery  His mother and his girlfriend can help

## 2022-05-12 NOTE — ASSESSMENT & PLAN NOTE
Type 2 Diabetes Mellitus  Currently not  On treatment   Hemoglobin A1c- ?  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 Oral Hypoglycemic agents are generally avoided during the hospital stay . If glucose is consistently elevated ,I suggest using basal ,prandial Insulin regimen to control the glucose , as elevated glucose can be associated with adverse surgical out comes. Please consider involving Hospital Medicine or Endocrinology ,if any help is needed with Glucose control. Patient will be instructed based on the pre op clinic guidelines  about adjustment of diabetic treatment (If applicable )  considering the NPO status for Surgery      I had educated that uncontrolled DM can cause post op complications,risk of infection, wound healing problem,increased length of stay in hospital and its associated complications.I suggest exercise as much as possible and follow diabetic diet

## 2022-05-12 NOTE — OUTPATIENT SUBJECTIVE & OBJECTIVE
Outpatient Subjective & Objective     Chief complaint-Preoperative evaluation, Perioperative Medical management, complication reduction plan     Active cardiac conditions- none    Revised cardiac risk index predictors- history of cerebrovascular disease and creatinine more than 2    Functional capacity -Examples of physical activity , walks, Grocery, uses public transportation,  can take a flight of stairs holding on to the railing----- He can undertake all the above activities without  chest pain,chest tightness, Shortness of breath ,dizziness,lightheadedness making his exercise tolerance more,   than 4 Mets.     Review of Systems   Constitutional: Negative for chills and fever.        No unusual weight changes     HENT:        STOPBANG score  3/ 8    Elevated BP  Neck size over 40 CM  Male gender   Eyes:        As noted   Respiratory:        No cough , phlegm    No Hemoptysis   Cardiovascular:        As noted   Gastrointestinal:        Bowels- Regular No overt GI/ blood losses  He reports having dark stool that he attributes to the phosphate binder   Endocrine:        Prednisone use > 20 mg daily for 3 weeks- None   Genitourinary: Negative for dysuria.   Musculoskeletal:          No unusual muscle/ joint pains   Skin: Negative for rash.   Neurological: Negative for syncope.        Rt  unilateral weakness  Rt handed   Hematological:        Current use of Anticoagulants  None  I suggested checking with his primary care doctor before resuming aspirin 81 mg by mouth once a day because of the nausea he had today and the nausea he gets during dialysis    He does not have any stomach upset  He he does believe that he can take ASA   Psychiatric/Behavioral:        No Depression,Anxiety                 No anesthesia, bleeding, cardiac , PONVproblems with previous surgeries/procedures.  Medications and Allergies reviewed in epic.     FH- No anesthesia,bleeding / venous thrombosis , problems in family     Physical  "Exam  Blood pressure (!) 160/84, pulse 84, temperature 98.3 °F (36.8 °C), temperature source Oral, resp. rate 16, height 5' 8" (1.727 m), weight 77.1 kg (170 lb), SpO2 98 %.      Physical Exam  Constitutional- Vitals - Body mass index is 25.85 kg/m².,   Vitals:    05/12/22 0812   BP: (!) 160/84   Pulse: 84   Resp: 16   Temp: 98.3 °F (36.8 °C)     General appearance-Conscious,Coherent  Eyes- No conjunctival icterus,pupils  round  and reactive to light   ENT-Oral cavity- moist  , Hearing grossly normal   Neck- No thyromegaly ,Trachea -central, No jugular venous distension,   No Carotid Bruit   Cardiovascular -Heart Sounds- Normal ,  occasional irregularity suggestive of ectopy ,  systolic murmur aortic area ,  systolic murmur pulmonary area  and systolic murmur tricuspid area   , No gallop rhythm   Respiratory - Normal Respiratory Effort, Normal breath sounds,  no wheeze  and  no forced expiratory wheeze    Peripheral pitting pedal edema-- none , no calf pain   Gastrointestinal -Soft abdomen, No palpable masses, Non Tender,Liver,Spleen not palpable. No-- free fluid and shifting dullness  Musculoskeletal- No finger Clubbing. Strength grossly normal   Lymphatic-No Palpable cervical, axillary,Inguinal lymphadenopathy   Psychiatric - normal effect,Orientation  Rt Dorsalis pedis pulses-palpable    Lt Dorsalis pedis pulses- palpable   Rt Posterior tibial pulses -palpable   Left posterior tibial pulses -palpable   Miscellaneous -  no renal bruit  Investigations  Lab and Imaging have been reviewed in epic.    Review of Medicine tests    EKG- I had independently reviewed the EKG from--5/12/2022  No acute changes      July 2021    · The left ventricle is normal in size with mild concentric hypertrophy and normal systolic function.  · The estimated ejection fraction is 55%.  · Grade I left ventricular diastolic dysfunction.  · Mild left atrial enlargement.  · Mild tricuspid regurgitation.  · The quantitatively derived ejection " fraction is 50%.  · Cannot exclude bicuspid aortic valve.  · Normal right ventricular size with normal right ventricular systolic function.  · Mild mitral regurgitation.  · Normal central venous pressure (3 mmHg).  · The estimated PA systolic pressure is 19 mmHg.       Review of clinical lab tests:  Lab Results   Component Value Date    CREATININE 6.5 (H) 06/23/2021    HGB 10.6 (L) 06/23/2021     06/23/2021           Review of old records- Was done and information gathered regards to events leading to surgery and health conditions of significance in the perioperative period.    Outpatient Subjective & Objective

## 2022-05-12 NOTE — ASSESSMENT & PLAN NOTE
July 2019   As per the history obtained from him it sounds like his coronary artery disease was diagnosed based on EKG and he did not have any symptoms of coronary artery disease  He does have cardiovascular risk factors namely hypertension, diabetes, hyperlipidemia     Haroldo Dickinson has known coronary artery disease having had PCI of RCA in the past.     July 2021      · The Mid RCA lesion was 100% stenosed.  · The Mid LAD lesion was 90% stenosed.  · The estimated blood loss was <50 mL.  · There was two vessel coronary artery disease.    · Despite abnormal stress would recommend proceed with transplant evaluation.    He gives a history that he was on dual antiplatelet agents treatment after his coronary intervention in 2019  He stopped aspirin Plavix about an year ago and currently not on any antiplatelet agent therapy      Risk , benefits of ASA use in the perioperative period discussed    I suggested to start taking aspirin 81 mg coated aspirin once a day.  He can handle aspirin to his understanding

## 2022-05-12 NOTE — ASSESSMENT & PLAN NOTE
He does have hearing impairment I have noticed that he was getting his cellphone closer to his eyes  She also is unable to drive  His eye impairment is a from a combination of blood pressure and diabetes  He had laser treatment to his eyes in the past  His visual impairment is stable  His mother is going to accompany him into the hospital

## 2022-05-12 NOTE — ASSESSMENT & PLAN NOTE
As per him Hb> 10  He does not have any overt blood losses  This morning he was nauseated and he vomited from feeling hot  He feels good after throwing up  He also has vomiting with dialysis-he relates this vomiting to when he has excess fluid on him and when they tried to remove the ultrafiltrate- and  blood pressure comes down

## 2022-05-12 NOTE — ASSESSMENT & PLAN NOTE
He had a drug-eluting stent in July 2019  He is currently not on any aspirin treatment  I suggest that he takes aspirin 81 mg by mouth once a day  I suggest that he continues on aspirin in the perioperative time due to the risk of stent thrombosis without aspirin treatment

## 2022-05-12 NOTE — ASSESSMENT & PLAN NOTE
Has a right-sided weakness from the previous stroke he has had  He feels that he has gained 70% of his strength back after his stroke  He has no difficulty swallowing  He uses a cane to ambulate  He does not drive due to his eyes been affected by diabetes
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
## 2022-05-12 NOTE — DISCHARGE INSTRUCTIONS
Your surgery has been scheduled for:_______5/19/2022___________________________________    You should report to:  ____[facility], located on the [place] side of the first floor of the           [facility] ([phone])  __x__[facility], located on the [facility] side of the            Second floor of the [facility] ([phone])  ____3rd Floor [facility] located on the [facility] side of the [facility] [phone]  ____[facility]: [facility] [address] [facility] [place] [address]. [address].     Please Note   Tell your doctor if you take Aspirin, products containing Aspirin, herbal medications  or blood thinners, such as Coumadin, Ticlid, or Plavix.  (Consult your provider regarding holding or stopping before surgery).  Arrange for someone to drive you home following surgery.  You will not be allowed to leave the surgical facility alone or drive yourself home following sedation and anesthesia.    Before Surgery  Stop taking all herbal medications, vitamins, and supplements 7 days prior to surgery  No Motrin/Advil (Ibuprofen) 7 days before surgery  No Aleve (Naproxen) 7 days before surgery  Stop Taking Asprin, products containing Asprin _N/A____days before surgery  Stop taking blood thinners__N/A_____days before surgery  No Goody's/BC  Powder 7 days before surgery  Refrain from drinking alcoholic beverages for 24hours before and after surgery  Stop or limit smoking ____7_____days before surgery  You may take Tylenol for pain    Night before Surgery  Do not eat or drink after midnight  Take a shower or bath (shower is recommended).  Bathe with Hibiclens soap or an antibacterial soap from the neck down.  If not supplied by your surgeon, hibiclens soap will need to be purchased over the counter in pharmacy.  Rinse soap off thoroughly.  Shampoo your hair with your regular shampoo    The Day of  Surgery  Take another bath or shower with hibiclens or any antibacterial soap, to reduce the chance of infection.  Take heart and blood pressure medications with a small sip of water, as advised by the perioperative team.  Do not take fluid pills  You may brush your teeth and rinse your mouth, but do not swall any additional water.   Do not apply perfumes, powder, body lotions or deodorant on the day of surgery.  Nail polish should be removed.  Do not wear makeup or moisturizer  Wear comfortable clothes, such as a button front shirt and loose fitting pants.  Leave all jewelry, including body piercings, and valuables at home.    Bring any devices you will neeed after surgery such as crutches or canes.  If you have sleep apnea, please bring your CPAP machine  In the event that your physical condition changes including the onset of a cold or respiratory illness, or if you have to delay or cancel your surgery, please notify your surgeon.       Anesthesia: General Anesthesia     You are watched continuously during your procedure by your anesthesia provider.     Youre due to have surgery. During surgery, youll be given medicine called anesthesia or anesthetic. This will keep you comfortable and pain-free. Your anesthesia provider will use general anesthesia.  What is general anesthesia?  General anesthesia puts you into a state like deep sleep. It goes into the bloodstream (IV anesthetics), into the lungs (gas anesthetics), or both. You feel nothing during the procedure. You will not remember it. During the procedure, the anesthesia provider monitors you continuously. He or she checks your heart rate and rhythm, blood pressure, breathing, and blood oxygen.  IV anesthetics. IV anesthetics are given through an IV line in your arm. Theyre often given first. This is so you are asleep before a gas anesthetic is started. Some kinds of IV anesthetics relieve pain. Others relax you. Your doctor will decide which kind is best  in your case.  Gas anesthetics. Gas anesthetics are breathed into the lungs. They are often used to keep you asleep. They can be given through a facemask or a tube placed in your larynx or trachea (breathing tube).  If you have a facemask, your anesthesia provider will most likely place it over your nose and mouth while youre still awake. Youll breathe oxygen through the mask as your IV anesthetic is started. Gas anesthetic may be added through the mask.  If you have a tube in the larynx or trachea, it will be inserted into your throat after youre asleep.  Anesthesia tools and medicines  You will likely have:  IV anesthetics. These are put into an IV line into your bloodstream.  Gas anesthetics. You breathe these anesthetics into your lungs, where they pass into your bloodstream.  Pulse oximeter. This is a small clip that is attached to the end of your finger. This measures your blood oxygen level.  Electrocardiography leads (electrodes). These are small sticky pads that are placed on your chest. They record your heart rate and rhythm.  Blood pressure cuff. This reads your blood pressure.  Risks and possible complications  General anesthesia has some risks. These include:  Breathing problems  Nausea and vomiting  Sore throat or hoarseness (usually temporary)  Allergic reaction to the anesthetic  Irregular heartbeat (rare)  Cardiac arrest (rare)   Anesthesia safety  Follow all instructions you are given for how long not to eat or drink before your procedure.  Be sure your doctor knows what medicines and drugs you take. This includes over-the-counter medicines, herbs, supplements, alcohol or other drugs. You will be asked when those were last taken.  Have an adult family member or friend drive you home after the procedure.  For the first 24 hours after your surgery:  Do not drive or use heavy equipment.  Do not make important decisions or sign legal documents. If important decisions or signing legal documents is  necessary during the first 24 hours after surgery, have a trusted family member or spouse act on your behalf.  Avoid alcohol.  Have a responsible adult stay with you. He or she can watch for problems and help keep you safe.  Date Last Reviewed: 12/1/2016 © 2000-2017 [employer]. [address] [place] [address], [place] [address]. All rights reserved. This information is not intended as a substitute for professional medical care. Always follow your healthcare professional's instructions.

## 2022-05-12 NOTE — ASSESSMENT & PLAN NOTE
He is on treatment with 4 antihypertensive medication namely amlodipine, carvedilol, hydralazine, losartan      Dialysis  BP readings -140/80's  Recent BP readings in the record-  Vitals - 1 value per visit 1/4/2022 3/8/2022 5/12/2022   SYSTOLIC 160 175 160   DIASTOLIC 93 92 84     As per the history obtained from him he finds the carvedilol and hydralazine initiation helping his blood pressure  He also tells me that if he takes amlodipine and the carvedilol in the morning time he gets low blood pressure during dialysis  I suggest that the perioperative care team, dialysis team in the hospital be aware of this  On dialysis days he takes amlodipine in the morning and carvedilol later    Goal BP       Manual BP check- 125 to 130/<80  Machine check- 120 to 125/<80    He does not seem to be taking excessive salty food on a regular basis    His takes to fluid restriction    Hypertension-  Blood pressure is elevated . I suggest continuation of amlodipine, carvedilol, hydralazine - during the entire perioperative period. I suggest holding -losartan- on the morning of the surgery and can continue that  post operatively under blood pressure, electrolyte and renal function monitoring as long as they are acceptable.I suggest addressing pain control as uncontrolled pain can increased blood pressure
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
## 2022-05-12 NOTE — ASSESSMENT & PLAN NOTE
Polycystic kidney disease with innumerable simple and complex cysts    Diabetes, HTN related     ESRD (HD dialysis MWF)    His surgery is scheduled for the May 19 which is the Thursday, the day after his scheduled dialysis on Wednesday    -I suggest checking the electrolytes on presentation to the surgery to ensure that the electrolytes are appropriate to proceed with surgery. I suggest checking the electrolytes closely . I suggest monitoring the fluid status so that fluid overload can be avoided . Please note that the patient may be at increased risk of bleeding from platelet dysfunction from renal failure . I suggest involving the nephrology team for on going dialysis treatment     I suggest having HD the day before surgery     He is getting erythropoietin, iron treatment     He had if he practically does not make any urine  He urinates once a month a small amount  He is on a fluid restriction of 1 L of fluid in a day

## 2022-05-18 ENCOUNTER — ANESTHESIA EVENT (OUTPATIENT)
Dept: SURGERY | Facility: HOSPITAL | Age: 47
DRG: 656 | End: 2022-05-18
Payer: MEDICARE

## 2022-05-18 ENCOUNTER — TELEPHONE (OUTPATIENT)
Dept: UROLOGY | Facility: CLINIC | Age: 47
End: 2022-05-18
Payer: MEDICARE

## 2022-05-18 NOTE — TELEPHONE ENCOUNTER
Called pt to confirm arrival time of 11am for procedure on 5/19/2022. Gave pt NPO instructions and gave pt opportunity to ask questions. Pt verbalized understanding.     Pt was informed that only 1 person would be allowed to accompany them the morning of surgery.  Pt verbalized understanding.

## 2022-05-18 NOTE — ANESTHESIA PREPROCEDURE EVALUATION
[facility]  Anesthesia Pre-Operative Evaluation         Patient Name: Haroldo Dickinson Sr.  YOB: 1975  MRN: [id number]    SUBJECTIVE:     Pre-operative evaluation for Procedure(s) (LRB):  XI ROBOTIC NEPHRECTOMY (Right)     05/18/2022    Haroldo Dickinson Sr. is a 46 y.o. male w/ a significant PMHx of CAD s/p PCI 2019, CVA w residual R sided weakness, HTN, DM, ESRD on HD (MWF) who was found to have R renal mass while undergoing kidney transplant workup.    Patient now presents for the above procedure(s).    TTE: 11/2020  · The left ventricle is normal in size with mild concentric hypertrophy and normal systolic function.  · The estimated ejection fraction is 55%.  · Grade I left ventricular diastolic dysfunction.  · Mild left atrial enlargement.  · Mild tricuspid regurgitation.  · The quantitatively derived ejection fraction is 50%.  · Cannot exclude bicuspid aortic valve.  · Normal right ventricular size with normal right ventricular systolic function.  · Mild mitral regurgitation.  · Normal central venous pressure (3 mmHg).  · The estimated PA systolic pressure is 19 mmHg.    LDA:        Hemodialysis AV Fistula Right forearm (Active)   Site Assessment Clean;Dry;No redness;Intact;No swelling;No warmth;No drainage 07/01/21 1600   Patency Present;Thrill;Bruit 07/01/21 1600   Status Deaccessed 07/01/21 1600   Dressing Status Clean;Dry;Intact 10/26/16 1025   Site Condition No complications 02/09/21 1121   Dressing Open to air (None) 06/04/15 1445   Number of days:        Prev airway: None documented.    Drips: None documented.      Patient Active Problem List   Diagnosis    Anemia    Controlled type 2 diabetes mellitus with CKD    Cerebral microvascular disease    End stage renal failure on dialysis    Hyperparathyroidism, secondary renal    Preoperative cardiovascular examination    Left ventricular hypertrophy due to hypertensive disease    Hemiparesis affecting right side as late  effect of cerebrovascular accident    Abnormality of gait following cerebrovascular accident (CVA)    Renal hypertension    Major vascular neurocognitive disorder, probable, without behavioral disturbance    Iron deficiency anemia    ERRONEOUS ENCOUNTER--DISREGARD    CAD (coronary artery disease), native coronary artery    Essential hypertension    Mixed hyperlipidemia    Patient on waiting list for kidney transplant    Arteriovenous fistula    Post PTCA    Screening for colon cancer    Colon cancer screening    History of colon polyps    Controlled type 2 diabetes mellitus with both eyes affected by proliferative retinopathy and macular edema, without long-term current use of insulin    Hypertensive retinopathy, bilateral    Varicose veins of bilateral lower extremities with other complications       Review of patient's allergies indicates:   Allergen Reactions    No known allergies        Current Inpatient Medications:      Current Facility-Administered Medications on File Prior to Encounter   Medication Dose Route Frequency Provider Last Rate Last Admin    ceFAZolin injection 2 g  2 g Intravenous On Call Procedure Michelle Spence MD        lidocaine (PF) 10 mg/ml (1%) injection 10 mg  1 mL Intradermal Once Michelle Spence MD         Current Outpatient Medications on File Prior to Encounter   Medication Sig Dispense Refill    amLODIPine (NORVASC) 10 MG tablet Take 1 tablet (10 mg total) by mouth once daily. 30 tablet 3    aspirin (ECOTRIN) 81 MG EC tablet Take 1 tablet (81 mg total) by mouth once daily. 30 tablet 11    atorvastatin (LIPITOR) 40 MG tablet Take 1 tablet (40 mg total) by mouth once daily. (Patient not taking: Reported on 5/12/2022) 90 tablet 3    AURYXIA 210 mg iron Tab       calcium acetate (PHOSLO) 667 mg capsule Take 1,334 mg by mouth 3 (three) times daily with meals.      carvediloL (COREG) 25 MG tablet Take 1 tablet (25 mg total) by mouth 2 (two) times daily  with meals. 180 tablet 3    cinacalcet (SENSIPAR) 30 MG Tab 90 mg once daily.      cinacalcet (SENSIPAR) 60 MG Tab       doxercalciferoL 2 mcg/mL Soln 2 mcg.      ethyl chloride 100% 100 % spray SPRAY SMALL AMOUNT TO ACCESS SITE THREE TIMES A WEEK ON DIALYSIS DAYS      hydrALAZINE (APRESOLINE) 25 MG tablet Take 25 mg by mouth 2 (two) times daily.      losartan (COZAAR) 100 MG tablet Take 100 mg by mouth once daily.      methoxy peg-epoetin beta (MIRCERA INJ) 75 mcg.      ondansetron (ZOFRAN) 4 MG tablet Take 1 tablet (4 mg total) by mouth every 6 (six) hours as needed for Nausea. Take 1 tab by mouth 20 minutes prior to CT scan. You may repeat the medication again in 6 hours if needed. (Patient not taking: Reported on 5/12/2022) 2 tablet 0    sevelamer carbonate (RENVELA) 800 mg Tab       sucroferric oxyhydroxide (VELPHORO) 500 mg Chew Take 2 tablets by mouth.         Past Surgical History:   Procedure Laterality Date    ANGIOGRAM, CORONARY, WITH LEFT HEART CATHETERIZATION N/A 7/1/2021    Procedure: Angiogram, Coronary, with Left Heart Cath;  Surgeon: Miki Zendejas MD;  Location: [facility] CATH LAB;  Service: Cardiology;  Laterality: N/A;    COLONOSCOPY N/A 5/3/2017    Procedure: COLONOSCOPY;  Surgeon: Rufino Carpenter MD;  [facility]  Service: Endoscopy;  Laterality: N/A;  pt states can only schedule on Wednesdays    COLONOSCOPY N/A 2/9/2021    Procedure: COLONOSCOPY;  Surgeon: Edil Wong MD;  [facility]  Service: Endoscopy;  Laterality: N/A;  Dialysis MWF/ labwork day of procedure  right arm aceess  per Dr. Colin pt can hold Plavix 5 days prior see note- sm  COVID test on 2/6/21 at [facility] - sm    COLONOSCOPY N/A 2/10/2021    Procedure: COLONOSCOPY;  Surgeon: Robert Ayers MD;  [facility]  Service: Endoscopy;  Laterality: N/A;  rescheduled due to poor bowel prep-BB  negative covid screening 2/6/21-BB  dialysis M-W-F-BB  brian to r/s for 2/9/21  and to hold Plavix per Dr. KIRAN Wong-BB  labs same day-BB    CORONARY STENT PLACEMENT N/A 7/25/2019    Procedure: INSERTION, STENT, CORONARY ARTERY;  Surgeon: Miki Zendejas MD;  Location: [facility] CATH LAB;  Service: Cardiology;  Laterality: N/A;    DIALYSIS FISTULA CREATION      FISTULOGRAM N/A 2/18/2019    Procedure: Fistulogram;  Surgeon: Yaneth De La Cruz MD;  Location: [facility] CATH LAB;  Service: Cardiology;  Laterality: N/A;    FISTULOGRAM Right 7/23/2019    Procedure: Fistulogram;  Surgeon: Oz Cordoba MD;  Location: [facility] CATH LAB;  Service: Cardiology;  Laterality: Right;    LEFT HEART CATHETERIZATION Left 7/25/2019    Procedure: Left heart cath;  Surgeon: Miki Zendejas MD;  Location: [facility] CATH LAB;  Service: Cardiology;  Laterality: Left;    RETINAL LASER PROCEDURE Bilateral 2018 or 2017    Dr. Rothman    VASCULAR SURGERY         OBJECTIVE:     Vital Signs Range (Last 24H):         Significant Labs:  Lab Results   Component Value Date    WBC 7.68 05/12/2022    HGB 10.1 (L) 05/12/2022    HCT 32.3 (L) 05/12/2022     05/12/2022    CHOL 111 (L) 01/02/2020    TRIG 34 01/02/2020    HDL 56 01/02/2020    ALT 14 05/12/2022    AST 17 05/12/2022     05/12/2022    K 4.2 05/12/2022    CL 97 05/12/2022    CREATININE 9.0 (H) 05/12/2022    BUN 39 (H) 05/12/2022    CO2 31 (H) 05/12/2022    TSH 0.989 10/13/2015    PSA 0.95 02/06/2020    INR 0.9 06/23/2021    HGBA1C 4.7 05/12/2022       Diagnostic Studies: No relevant studies.    EKG:   Results for orders placed or performed during the hospital encounter of 05/12/22   EKG 12-lead    Collection Time: 05/12/22  7:55 AM    Narrative    Test Reason : Z01.818,    Vent. Rate : 070 BPM     Atrial Rate : 070 BPM     P-R Int : 160 ms          QRS Dur : 094 ms      QT Int : 404 ms       P-R-T Axes : 073 060 084 degrees     QTc Int : 436 ms    Normal sinus rhythm  LVH with secondary ST-T wave changes  Abnormal ECG  When compared with ECG of 01-JUL-2021  10:47,  Questionable change in The axis  Confirmed by Anival Landrum MD ([id number]) on 5/12/2022 12:26:02 PM    Referred By: CATINA NEVILLE           Confirmed By:Anival Landrum MD       ASSESSMENT/PLAN:                                                                                                                05/18/2022  Haroldo Dickinson Sr. is a 46 y.o., male.      Pre-op Assessment    I have reviewed the Patient Summary Reports.     I have reviewed the Nursing Notes.    I have reviewed the Medications.     Review of Systems  Anesthesia Hx:  No problems with previous Anesthesia Denies Hx of Anesthetic complications    Cardiovascular:   Hypertension Past MI CAD      Renal/:   Chronic Renal Disease, ESRD, Dialysis    Neurological:   CVA, residual symptoms    Endocrine:   Diabetes           Anesthesia Plan  Type of Anesthesia, risks & benefits discussed:    Anesthesia Type: Gen ETT  Intra-op Monitoring Plan: Standard ASA Monitors  Post Op Pain Control Plan: multimodal analgesia and IV/PO Opioids PRN  Induction:  IV  Airway Plan: Direct, Post-Induction  Informed Consent: Informed consent signed with the Patient and all parties understand the risks and agree with anesthesia plan.  All questions answered.   ASA Score: 3  Day of Surgery Review of History & Physical: H&P Update referred to the surgeon/provider.    Ready For Surgery From Anesthesia Perspective.     .

## 2022-05-19 ENCOUNTER — HOSPITAL ENCOUNTER (INPATIENT)
Facility: HOSPITAL | Age: 47
LOS: 1 days | Discharge: HOME | DRG: 656 | End: 2022-05-20
Attending: UROLOGY | Admitting: UROLOGY
Payer: MEDICARE

## 2022-05-19 ENCOUNTER — ANESTHESIA (OUTPATIENT)
Dept: SURGERY | Facility: HOSPITAL | Age: 47
DRG: 656 | End: 2022-05-19
Payer: MEDICARE

## 2022-05-19 DIAGNOSIS — N28.89 RIGHT RENAL MASS: Primary | ICD-10-CM

## 2022-05-19 DIAGNOSIS — Z01.818 PREOPERATIVE TESTING: ICD-10-CM

## 2022-05-19 DIAGNOSIS — Z90.5 S/P NEPHRECTOMY: ICD-10-CM

## 2022-05-19 LAB
ALBUMIN SERPL BCP-MCNC: 3.1 G/DL (ref 3.5–5.2)
ALP SERPL-CCNC: 65 U/L (ref 55–135)
ALT SERPL W/O P-5'-P-CCNC: 11 U/L (ref 10–44)
ANION GAP SERPL CALC-SCNC: 9 MMOL/L (ref 8–16)
ANION GAP SERPL CALC-SCNC: 9 MMOL/L (ref 8–16)
AST SERPL-CCNC: 12 U/L (ref 10–40)
BASOPHILS # BLD AUTO: 0.04 K/UL (ref 0–0.2)
BASOPHILS NFR BLD: 0.3 % (ref 0–1.9)
BILIRUB SERPL-MCNC: 0.9 MG/DL (ref 0.1–1)
BUN SERPL-MCNC: 29 MG/DL (ref 6–20)
BUN SERPL-MCNC: 32 MG/DL (ref 6–20)
CALCIUM SERPL-MCNC: 8.3 MG/DL (ref 8.7–10.5)
CALCIUM SERPL-MCNC: 8.5 MG/DL (ref 8.7–10.5)
CHLORIDE SERPL-SCNC: 100 MMOL/L (ref 95–110)
CHLORIDE SERPL-SCNC: 103 MMOL/L (ref 95–110)
CO2 SERPL-SCNC: 25 MMOL/L (ref 23–29)
CO2 SERPL-SCNC: 31 MMOL/L (ref 23–29)
CREAT SERPL-MCNC: 8.9 MG/DL (ref 0.5–1.4)
CREAT SERPL-MCNC: 9.3 MG/DL (ref 0.5–1.4)
DIFFERENTIAL METHOD: ABNORMAL
EOSINOPHIL # BLD AUTO: 0 K/UL (ref 0–0.5)
EOSINOPHIL NFR BLD: 0.2 % (ref 0–8)
ERYTHROCYTE [DISTWIDTH] IN BLOOD BY AUTOMATED COUNT: 15.3 % (ref 11.5–14.5)
EST. GFR  (AFRICAN AMERICAN): 7.1 ML/MIN/1.73 M^2
EST. GFR  (AFRICAN AMERICAN): 7.5 ML/MIN/1.73 M^2
EST. GFR  (NON AFRICAN AMERICAN): 6.1 ML/MIN/1.73 M^2
EST. GFR  (NON AFRICAN AMERICAN): 6.5 ML/MIN/1.73 M^2
GLUCOSE SERPL-MCNC: 178 MG/DL (ref 70–110)
GLUCOSE SERPL-MCNC: 93 MG/DL (ref 70–110)
HCT VFR BLD AUTO: 26.9 % (ref 40–54)
HGB BLD-MCNC: 8.6 G/DL (ref 14–18)
IMM GRANULOCYTES # BLD AUTO: 0.03 K/UL (ref 0–0.04)
IMM GRANULOCYTES NFR BLD AUTO: 0.3 % (ref 0–0.5)
LYMPHOCYTES # BLD AUTO: 0.6 K/UL (ref 1–4.8)
LYMPHOCYTES NFR BLD: 5.5 % (ref 18–48)
MAGNESIUM SERPL-MCNC: 2 MG/DL (ref 1.6–2.6)
MCH RBC QN AUTO: 30.5 PG (ref 27–31)
MCHC RBC AUTO-ENTMCNC: 32 G/DL (ref 32–36)
MCV RBC AUTO: 95 FL (ref 82–98)
MONOCYTES # BLD AUTO: 0.2 K/UL (ref 0.3–1)
MONOCYTES NFR BLD: 1.5 % (ref 4–15)
NEUTROPHILS # BLD AUTO: 10.7 K/UL (ref 1.8–7.7)
NEUTROPHILS NFR BLD: 92.2 % (ref 38–73)
NRBC BLD-RTO: 0 /100 WBC
PHOSPHATE SERPL-MCNC: 3.5 MG/DL (ref 2.7–4.5)
PLATELET # BLD AUTO: 266 K/UL (ref 150–450)
PMV BLD AUTO: 9 FL (ref 9.2–12.9)
POCT GLUCOSE: 194 MG/DL (ref 70–110)
POCT GLUCOSE: 203 MG/DL (ref 70–110)
POTASSIUM SERPL-SCNC: 3.8 MMOL/L (ref 3.5–5.1)
POTASSIUM SERPL-SCNC: 4.4 MMOL/L (ref 3.5–5.1)
PROT SERPL-MCNC: 7.2 G/DL (ref 6–8.4)
RBC # BLD AUTO: 2.82 M/UL (ref 4.6–6.2)
SODIUM SERPL-SCNC: 137 MMOL/L (ref 136–145)
SODIUM SERPL-SCNC: 140 MMOL/L (ref 136–145)
WBC # BLD AUTO: 11.59 K/UL (ref 3.9–12.7)

## 2022-05-19 PROCEDURE — 25000003 PHARM REV CODE 250: Mod: NTX | Performed by: STUDENT IN AN ORGANIZED HEALTH CARE EDUCATION/TRAINING PROGRAM

## 2022-05-19 PROCEDURE — 71000033 HC RECOVERY, INTIAL HOUR: Mod: NTX | Performed by: UROLOGY

## 2022-05-19 PROCEDURE — 80053 COMPREHEN METABOLIC PANEL: CPT | Mod: NTX | Performed by: STUDENT IN AN ORGANIZED HEALTH CARE EDUCATION/TRAINING PROGRAM

## 2022-05-19 PROCEDURE — 88307 PR  SURG PATH,LEVEL V: ICD-10-PCS | Mod: 26,NTX,, | Performed by: PATHOLOGY

## 2022-05-19 PROCEDURE — 80048 BASIC METABOLIC PNL TOTAL CA: CPT | Mod: NTX | Performed by: STUDENT IN AN ORGANIZED HEALTH CARE EDUCATION/TRAINING PROGRAM

## 2022-05-19 PROCEDURE — 82962 GLUCOSE BLOOD TEST: CPT | Mod: NTX | Performed by: UROLOGY

## 2022-05-19 PROCEDURE — 27201423 OPTIME MED/SURG SUP & DEVICES STERILE SUPPLY: Mod: NTX | Performed by: UROLOGY

## 2022-05-19 PROCEDURE — 37000008 HC ANESTHESIA 1ST 15 MINUTES: Mod: NTX | Performed by: UROLOGY

## 2022-05-19 PROCEDURE — 63600175 PHARM REV CODE 636 W HCPCS: Mod: NTX | Performed by: STUDENT IN AN ORGANIZED HEALTH CARE EDUCATION/TRAINING PROGRAM

## 2022-05-19 PROCEDURE — 63600175 PHARM REV CODE 636 W HCPCS: Mod: NTX

## 2022-05-19 PROCEDURE — 64461 PVB THORACIC SINGLE INJ SITE: CPT | Mod: NTX | Performed by: STUDENT IN AN ORGANIZED HEALTH CARE EDUCATION/TRAINING PROGRAM

## 2022-05-19 PROCEDURE — 25000003 PHARM REV CODE 250: Mod: NTX | Performed by: ANESTHESIOLOGY

## 2022-05-19 PROCEDURE — 71000015 HC POSTOP RECOV 1ST HR: Mod: NTX | Performed by: UROLOGY

## 2022-05-19 PROCEDURE — 76942 ECHO GUIDE FOR BIOPSY: CPT | Mod: NTX | Performed by: STUDENT IN AN ORGANIZED HEALTH CARE EDUCATION/TRAINING PROGRAM

## 2022-05-19 PROCEDURE — 64999 UNLISTED PX NERVOUS SYSTEM: CPT | Mod: ,,, | Performed by: STUDENT IN AN ORGANIZED HEALTH CARE EDUCATION/TRAINING PROGRAM

## 2022-05-19 PROCEDURE — 50546 LAPAROSCOPIC NEPHRECTOMY: CPT | Mod: RT,,, | Performed by: UROLOGY

## 2022-05-19 PROCEDURE — 94761 N-INVAS EAR/PLS OXIMETRY MLT: CPT | Mod: NTX

## 2022-05-19 PROCEDURE — 83735 ASSAY OF MAGNESIUM: CPT | Mod: NTX | Performed by: STUDENT IN AN ORGANIZED HEALTH CARE EDUCATION/TRAINING PROGRAM

## 2022-05-19 PROCEDURE — D9220A PRA ANESTHESIA: Mod: CRNA,NTX,, | Performed by: NURSE ANESTHETIST, CERTIFIED REGISTERED

## 2022-05-19 PROCEDURE — 63600175 PHARM REV CODE 636 W HCPCS: Mod: NTX | Performed by: NURSE ANESTHETIST, CERTIFIED REGISTERED

## 2022-05-19 PROCEDURE — D9220A PRA ANESTHESIA: ICD-10-PCS | Mod: CRNA,NTX,, | Performed by: NURSE ANESTHETIST, CERTIFIED REGISTERED

## 2022-05-19 PROCEDURE — 99900035 HC TECH TIME PER 15 MIN (STAT): Mod: NTX

## 2022-05-19 PROCEDURE — 88307 TISSUE EXAM BY PATHOLOGIST: CPT | Mod: 26,NTX,, | Performed by: PATHOLOGY

## 2022-05-19 PROCEDURE — D9220A PRA ANESTHESIA: Mod: ANES,NTX,, | Performed by: ANESTHESIOLOGY

## 2022-05-19 PROCEDURE — 88307 TISSUE EXAM BY PATHOLOGIST: CPT | Mod: NTX | Performed by: PATHOLOGY

## 2022-05-19 PROCEDURE — 37000009 HC ANESTHESIA EA ADD 15 MINS: Mod: NTX | Performed by: UROLOGY

## 2022-05-19 PROCEDURE — 36000712 HC OR TIME LEV V 1ST 15 MIN: Mod: NTX | Performed by: UROLOGY

## 2022-05-19 PROCEDURE — 63600175 PHARM REV CODE 636 W HCPCS: Mod: NTX | Performed by: ANESTHESIOLOGY

## 2022-05-19 PROCEDURE — 36000713 HC OR TIME LEV V EA ADD 15 MIN: Mod: NTX | Performed by: UROLOGY

## 2022-05-19 PROCEDURE — 50546 PR NEPHRECTOMY: ICD-10-PCS | Mod: RT,,, | Performed by: UROLOGY

## 2022-05-19 PROCEDURE — 11000001 HC ACUTE MED/SURG PRIVATE ROOM: Mod: NTX

## 2022-05-19 PROCEDURE — 27200750 HC INSULATED NEEDLE/ STIMUPLEX: Mod: NTX | Performed by: STUDENT IN AN ORGANIZED HEALTH CARE EDUCATION/TRAINING PROGRAM

## 2022-05-19 PROCEDURE — 25000003 PHARM REV CODE 250: Mod: NTX | Performed by: NURSE ANESTHETIST, CERTIFIED REGISTERED

## 2022-05-19 PROCEDURE — 84100 ASSAY OF PHOSPHORUS: CPT | Mod: NTX | Performed by: STUDENT IN AN ORGANIZED HEALTH CARE EDUCATION/TRAINING PROGRAM

## 2022-05-19 PROCEDURE — 71000016 HC POSTOP RECOV ADDL HR: Mod: NTX | Performed by: UROLOGY

## 2022-05-19 PROCEDURE — 85025 COMPLETE CBC W/AUTO DIFF WBC: CPT | Mod: NTX | Performed by: STUDENT IN AN ORGANIZED HEALTH CARE EDUCATION/TRAINING PROGRAM

## 2022-05-19 PROCEDURE — 27000221 HC OXYGEN, UP TO 24 HOURS: Mod: NTX

## 2022-05-19 PROCEDURE — 64999 PR BLOCK, ERECTOR SPINAE PLANE, SINGLE SHOT: ICD-10-PCS | Mod: ,,, | Performed by: STUDENT IN AN ORGANIZED HEALTH CARE EDUCATION/TRAINING PROGRAM

## 2022-05-19 PROCEDURE — D9220A PRA ANESTHESIA: ICD-10-PCS | Mod: ANES,NTX,, | Performed by: ANESTHESIOLOGY

## 2022-05-19 RX ORDER — PROCHLORPERAZINE EDISYLATE 5 MG/ML
5 INJECTION INTRAMUSCULAR; INTRAVENOUS EVERY 6 HOURS PRN
Status: DISCONTINUED | OUTPATIENT
Start: 2022-05-19 | End: 2022-05-20 | Stop reason: HOSPADM

## 2022-05-19 RX ORDER — FENTANYL CITRATE 50 UG/ML
25-200 INJECTION, SOLUTION INTRAMUSCULAR; INTRAVENOUS
Status: DISCONTINUED | OUTPATIENT
Start: 2022-05-19 | End: 2022-05-19

## 2022-05-19 RX ORDER — CALCIUM ACETATE 667 MG/1
1334 CAPSULE ORAL
Status: DISCONTINUED | OUTPATIENT
Start: 2022-05-19 | End: 2022-05-19

## 2022-05-19 RX ORDER — ASPIRIN 81 MG/1
81 TABLET ORAL DAILY
Status: DISCONTINUED | OUTPATIENT
Start: 2022-05-20 | End: 2022-05-20 | Stop reason: HOSPADM

## 2022-05-19 RX ORDER — SODIUM CHLORIDE 0.9 % (FLUSH) 0.9 %
10 SYRINGE (ML) INJECTION
Status: DISCONTINUED | OUTPATIENT
Start: 2022-05-19 | End: 2022-05-20 | Stop reason: HOSPADM

## 2022-05-19 RX ORDER — ACETAMINOPHEN 500 MG
1000 TABLET ORAL
Status: DISCONTINUED | OUTPATIENT
Start: 2022-05-19 | End: 2022-05-20 | Stop reason: HOSPADM

## 2022-05-19 RX ORDER — KETAMINE HCL IN 0.9 % NACL 50 MG/5 ML
SYRINGE (ML) INTRAVENOUS
Status: DISCONTINUED | OUTPATIENT
Start: 2022-05-19 | End: 2022-05-19

## 2022-05-19 RX ORDER — SODIUM CHLORIDE 9 MG/ML
INJECTION, SOLUTION INTRAVENOUS CONTINUOUS
Status: DISCONTINUED | OUTPATIENT
Start: 2022-05-19 | End: 2022-05-19

## 2022-05-19 RX ORDER — CLONIDINE 100 UG/ML
INJECTION, SOLUTION EPIDURAL
Status: COMPLETED | OUTPATIENT
Start: 2022-05-19 | End: 2022-05-19

## 2022-05-19 RX ORDER — METHOCARBAMOL 500 MG/1
1000 TABLET, FILM COATED ORAL 4 TIMES DAILY
Status: DISCONTINUED | OUTPATIENT
Start: 2022-05-19 | End: 2022-05-20 | Stop reason: HOSPADM

## 2022-05-19 RX ORDER — NEOSTIGMINE METHYLSULFATE 0.5 MG/ML
INJECTION, SOLUTION INTRAVENOUS
Status: DISCONTINUED | OUTPATIENT
Start: 2022-05-19 | End: 2022-05-19

## 2022-05-19 RX ORDER — CEFAZOLIN SODIUM/WATER 2 G/20 ML
2 SYRINGE (ML) INTRAVENOUS
Status: COMPLETED | OUTPATIENT
Start: 2022-05-19 | End: 2022-05-19

## 2022-05-19 RX ORDER — CINACALCET 30 MG/1
90 TABLET, FILM COATED ORAL
Status: DISCONTINUED | OUTPATIENT
Start: 2022-05-20 | End: 2022-05-20 | Stop reason: HOSPADM

## 2022-05-19 RX ORDER — LOSARTAN POTASSIUM 50 MG/1
100 TABLET ORAL DAILY
Status: DISCONTINUED | OUTPATIENT
Start: 2022-05-20 | End: 2022-05-20 | Stop reason: HOSPADM

## 2022-05-19 RX ORDER — ACETAMINOPHEN 500 MG
1000 TABLET ORAL
Status: COMPLETED | OUTPATIENT
Start: 2022-05-19 | End: 2022-05-19

## 2022-05-19 RX ORDER — ONDANSETRON 2 MG/ML
4 INJECTION INTRAMUSCULAR; INTRAVENOUS EVERY 6 HOURS PRN
Status: DISCONTINUED | OUTPATIENT
Start: 2022-05-19 | End: 2022-05-20 | Stop reason: HOSPADM

## 2022-05-19 RX ORDER — SEVELAMER CARBONATE 800 MG/1
800 TABLET, FILM COATED ORAL
Status: DISCONTINUED | OUTPATIENT
Start: 2022-05-19 | End: 2022-05-20 | Stop reason: HOSPADM

## 2022-05-19 RX ORDER — LIDOCAINE HYDROCHLORIDE 20 MG/ML
INJECTION, SOLUTION EPIDURAL; INFILTRATION; INTRACAUDAL; PERINEURAL
Status: DISCONTINUED | OUTPATIENT
Start: 2022-05-19 | End: 2022-05-19

## 2022-05-19 RX ORDER — HYDROMORPHONE HYDROCHLORIDE 1 MG/ML
0.2 INJECTION, SOLUTION INTRAMUSCULAR; INTRAVENOUS; SUBCUTANEOUS EVERY 5 MIN PRN
Status: DISCONTINUED | OUTPATIENT
Start: 2022-05-19 | End: 2022-05-19 | Stop reason: HOSPADM

## 2022-05-19 RX ORDER — MIDAZOLAM HYDROCHLORIDE 1 MG/ML
.5-4 INJECTION INTRAMUSCULAR; INTRAVENOUS
Status: DISCONTINUED | OUTPATIENT
Start: 2022-05-19 | End: 2022-05-19

## 2022-05-19 RX ORDER — DEXAMETHASONE SODIUM PHOSPHATE 10 MG/ML
INJECTION INTRAMUSCULAR; INTRAVENOUS
Status: COMPLETED | OUTPATIENT
Start: 2022-05-19 | End: 2022-05-19

## 2022-05-19 RX ORDER — CARVEDILOL 25 MG/1
25 TABLET ORAL 2 TIMES DAILY WITH MEALS
Status: DISCONTINUED | OUTPATIENT
Start: 2022-05-19 | End: 2022-05-20 | Stop reason: HOSPADM

## 2022-05-19 RX ORDER — CISATRACURIUM BESYLATE 2 MG/ML
INJECTION, SOLUTION INTRAVENOUS
Status: DISCONTINUED | OUTPATIENT
Start: 2022-05-19 | End: 2022-05-19

## 2022-05-19 RX ORDER — AMLODIPINE BESYLATE 10 MG/1
10 TABLET ORAL DAILY
Status: DISCONTINUED | OUTPATIENT
Start: 2022-05-20 | End: 2022-05-20 | Stop reason: HOSPADM

## 2022-05-19 RX ORDER — FENTANYL CITRATE 50 UG/ML
INJECTION, SOLUTION INTRAMUSCULAR; INTRAVENOUS
Status: DISCONTINUED | OUTPATIENT
Start: 2022-05-19 | End: 2022-05-19

## 2022-05-19 RX ORDER — BUPIVACAINE HYDROCHLORIDE 7.5 MG/ML
INJECTION, SOLUTION EPIDURAL; RETROBULBAR
Status: COMPLETED | OUTPATIENT
Start: 2022-05-19 | End: 2022-05-19

## 2022-05-19 RX ORDER — HEPARIN SODIUM 5000 [USP'U]/ML
5000 INJECTION, SOLUTION INTRAVENOUS; SUBCUTANEOUS EVERY 8 HOURS
Status: DISCONTINUED | OUTPATIENT
Start: 2022-05-19 | End: 2022-05-20 | Stop reason: HOSPADM

## 2022-05-19 RX ORDER — MIDAZOLAM HYDROCHLORIDE 1 MG/ML
INJECTION INTRAMUSCULAR; INTRAVENOUS
Status: COMPLETED
Start: 2022-05-19 | End: 2022-05-19

## 2022-05-19 RX ORDER — FENTANYL CITRATE 50 UG/ML
INJECTION, SOLUTION INTRAMUSCULAR; INTRAVENOUS
Status: COMPLETED
Start: 2022-05-19 | End: 2022-05-19

## 2022-05-19 RX ORDER — ONDANSETRON 2 MG/ML
INJECTION INTRAMUSCULAR; INTRAVENOUS
Status: DISCONTINUED | OUTPATIENT
Start: 2022-05-19 | End: 2022-05-19

## 2022-05-19 RX ORDER — DEXAMETHASONE SODIUM PHOSPHATE 4 MG/ML
INJECTION, SOLUTION INTRA-ARTICULAR; INTRALESIONAL; INTRAMUSCULAR; INTRAVENOUS; SOFT TISSUE
Status: DISCONTINUED | OUTPATIENT
Start: 2022-05-19 | End: 2022-05-19

## 2022-05-19 RX ORDER — OXYCODONE HYDROCHLORIDE 5 MG/1
5 TABLET ORAL EVERY 6 HOURS PRN
Status: DISCONTINUED | OUTPATIENT
Start: 2022-05-19 | End: 2022-05-20

## 2022-05-19 RX ORDER — TRAMADOL HYDROCHLORIDE 50 MG/1
50 TABLET ORAL EVERY 6 HOURS PRN
Status: DISCONTINUED | OUTPATIENT
Start: 2022-05-19 | End: 2022-05-20

## 2022-05-19 RX ORDER — PROPOFOL 10 MG/ML
VIAL (ML) INTRAVENOUS
Status: DISCONTINUED | OUTPATIENT
Start: 2022-05-19 | End: 2022-05-19

## 2022-05-19 RX ORDER — HYDRALAZINE HYDROCHLORIDE 25 MG/1
25 TABLET, FILM COATED ORAL 2 TIMES DAILY
Status: DISCONTINUED | OUTPATIENT
Start: 2022-05-19 | End: 2022-05-20 | Stop reason: HOSPADM

## 2022-05-19 RX ORDER — HEPARIN SODIUM 5000 [USP'U]/ML
5000 INJECTION, SOLUTION INTRAVENOUS; SUBCUTANEOUS
Status: COMPLETED | OUTPATIENT
Start: 2022-05-19 | End: 2022-05-19

## 2022-05-19 RX ORDER — ONDANSETRON 2 MG/ML
4 INJECTION INTRAMUSCULAR; INTRAVENOUS DAILY PRN
Status: DISCONTINUED | OUTPATIENT
Start: 2022-05-19 | End: 2022-05-19 | Stop reason: HOSPADM

## 2022-05-19 RX ADMIN — LIDOCAINE HYDROCHLORIDE 100 MG: 20 INJECTION, SOLUTION EPIDURAL; INFILTRATION; INTRACAUDAL at 01:05

## 2022-05-19 RX ADMIN — PROPOFOL 50 MG: 10 INJECTION, EMULSION INTRAVENOUS at 01:05

## 2022-05-19 RX ADMIN — OXYCODONE 5 MG: 5 TABLET ORAL at 07:05

## 2022-05-19 RX ADMIN — MIDAZOLAM 2 MG: 1 INJECTION INTRAMUSCULAR; INTRAVENOUS at 12:05

## 2022-05-19 RX ADMIN — HYDROMORPHONE HYDROCHLORIDE 0.2 MG: 1 INJECTION, SOLUTION INTRAMUSCULAR; INTRAVENOUS; SUBCUTANEOUS at 06:05

## 2022-05-19 RX ADMIN — MIDAZOLAM HYDROCHLORIDE 2 MG: 1 INJECTION INTRAMUSCULAR; INTRAVENOUS at 12:05

## 2022-05-19 RX ADMIN — GLYCOPYRROLATE 0.4 MG: 0.2 INJECTION, SOLUTION INTRAMUSCULAR; INTRAVITREAL at 04:05

## 2022-05-19 RX ADMIN — FENTANYL CITRATE 100 MCG: 50 INJECTION INTRAMUSCULAR; INTRAVENOUS at 01:05

## 2022-05-19 RX ADMIN — CISATRACURIUM BESYLATE 12 MG: 2 INJECTION, SOLUTION INTRAVENOUS at 01:05

## 2022-05-19 RX ADMIN — FENTANYL CITRATE 50 MCG: 50 INJECTION, SOLUTION INTRAMUSCULAR; INTRAVENOUS at 12:05

## 2022-05-19 RX ADMIN — NEOSTIGMINE METHYLSULFATE 4 MG: 0.5 INJECTION INTRAVENOUS at 04:05

## 2022-05-19 RX ADMIN — METHOCARBAMOL 1000 MG: 500 TABLET ORAL at 09:05

## 2022-05-19 RX ADMIN — SODIUM CHLORIDE: 0.9 INJECTION, SOLUTION INTRAVENOUS at 01:05

## 2022-05-19 RX ADMIN — CISATRACURIUM BESYLATE 4 MG: 2 INJECTION, SOLUTION INTRAVENOUS at 02:05

## 2022-05-19 RX ADMIN — HEPARIN SODIUM 5000 UNITS: 5000 INJECTION INTRAVENOUS; SUBCUTANEOUS at 09:05

## 2022-05-19 RX ADMIN — CISATRACURIUM BESYLATE 4 MG: 2 INJECTION, SOLUTION INTRAVENOUS at 03:05

## 2022-05-19 RX ADMIN — DEXAMETHASONE SODIUM PHOSPHATE 1 MG: 10 INJECTION, SOLUTION INTRAMUSCULAR; INTRAVENOUS at 01:05

## 2022-05-19 RX ADMIN — Medication 10 MG: at 03:05

## 2022-05-19 RX ADMIN — ACETAMINOPHEN 1000 MG: 500 TABLET ORAL at 12:05

## 2022-05-19 RX ADMIN — SODIUM CHLORIDE, SODIUM GLUCONATE, SODIUM ACETATE, POTASSIUM CHLORIDE, MAGNESIUM CHLORIDE, SODIUM PHOSPHATE, DIBASIC, AND POTASSIUM PHOSPHATE: .53; .5; .37; .037; .03; .012; .00082 INJECTION, SOLUTION INTRAVENOUS at 01:05

## 2022-05-19 RX ADMIN — ACETAMINOPHEN 1000 MG: 500 TABLET ORAL at 06:05

## 2022-05-19 RX ADMIN — METHOCARBAMOL 1000 MG: 500 TABLET ORAL at 05:05

## 2022-05-19 RX ADMIN — BUPIVACAINE HYDROCHLORIDE 15 ML: 7.5 INJECTION, SOLUTION EPIDURAL; RETROBULBAR at 01:05

## 2022-05-19 RX ADMIN — DEXAMETHASONE SODIUM PHOSPHATE 4 MG: 4 INJECTION INTRA-ARTICULAR; INTRALESIONAL; INTRAMUSCULAR; INTRAVENOUS; SOFT TISSUE at 02:05

## 2022-05-19 RX ADMIN — ONDANSETRON HYDROCHLORIDE 4 MG: 2 INJECTION INTRAMUSCULAR; INTRAVENOUS at 04:05

## 2022-05-19 RX ADMIN — Medication 20 MG: at 02:05

## 2022-05-19 RX ADMIN — HYDROMORPHONE HYDROCHLORIDE 0.2 MG: 1 INJECTION, SOLUTION INTRAMUSCULAR; INTRAVENOUS; SUBCUTANEOUS at 05:05

## 2022-05-19 RX ADMIN — Medication 2 G: at 01:05

## 2022-05-19 RX ADMIN — FENTANYL CITRATE 50 MCG: 50 INJECTION INTRAMUSCULAR; INTRAVENOUS at 04:05

## 2022-05-19 RX ADMIN — HYDRALAZINE HYDROCHLORIDE 25 MG: 25 TABLET, FILM COATED ORAL at 09:05

## 2022-05-19 RX ADMIN — FENTANYL CITRATE 50 MCG: 50 INJECTION INTRAMUSCULAR; INTRAVENOUS at 12:05

## 2022-05-19 RX ADMIN — HEPARIN SODIUM 5000 UNITS: 5000 INJECTION INTRAVENOUS; SUBCUTANEOUS at 11:05

## 2022-05-19 RX ADMIN — CLONIDINE HYDROCHLORIDE 50 MCG: 100 INJECTION, SOLUTION INTRAVENOUS at 01:05

## 2022-05-19 RX ADMIN — CARVEDILOL 25 MG: 25 TABLET, FILM COATED ORAL at 05:05

## 2022-05-19 RX ADMIN — SODIUM CHLORIDE: 0.9 INJECTION, SOLUTION INTRAVENOUS at 05:05

## 2022-05-19 NOTE — ANESTHESIA PROCEDURE NOTES
Intubation    Date/Time: 5/19/2022 1:40 PM  Performed by: Amina Aguirre MD  Authorized by: Edouard Orozco MD     Intubation:     Induction:  Intravenous    Intubated:  Postinduction    Mask Ventilation:  Easy with oral airway    Attempts:  2    Attempted By:  Resident anesthesiologist    Method of Intubation:  Direct    Blade:  Lawson 2    Laryngeal View Grade: Grade III - only epiglottis visible      Attempted By (2nd Attempt):  Resident anesthesiologist    Method of Intubation (2nd Attempt):  Video laryngoscopy    Blade (2nd Attempt):  Card 4    Laryngeal View Grade (2nd Attempt): Grade I - full view of cords      Difficult Airway Encountered?: No      Complications:  None    Airway Device:  Oral endotracheal tube    Airway Device Size:  7.5    Style/Cuff Inflation:  Cuffed (inflated to minimal occlusive pressure)    Tube secured:  23    Secured at:  The lips    Placement Verified By:  Capnometry    Complicating Factors:  None, large prominent central incisors and large/floppy epiglottis    Findings Post-Intubation:  BS equal bilateral and atraumatic/condition of teeth unchanged

## 2022-05-19 NOTE — ANESTHESIA POSTPROCEDURE EVALUATION
Anesthesia Post Evaluation    Patient: Haroldo Dickinson Sr.    Procedure(s) Performed: Procedure(s) (LRB):  XI ROBOTIC NEPHRECTOMY (Right)    Final Anesthesia Type: general      Patient location during evaluation: PACU  Patient participation: Yes- Able to Participate  Level of consciousness: awake and alert  Post-procedure vital signs: reviewed and stable  Pain management: adequate  Airway patency: patent  ANI mitigation strategies: Multimodal analgesia  PONV status at discharge: No PONV  Anesthetic complications: no      Cardiovascular status: blood pressure returned to baseline and hemodynamically stable  Respiratory status: unassisted  Hydration status: euvolemic  Follow-up not needed.          Vitals Value Taken Time   /88 05/19/22 1802   Temp 36.2 °C (97.1 °F) 05/19/22 1655   Pulse 54 05/19/22 1805   Resp 20 05/19/22 1805   SpO2 96 % 05/19/22 1805   Vitals shown include unvalidated device data.      No case tracking events are documented in the log.      Pain/Marialuisa Score: Pain Rating Prior to Med Admin: 8 (5/19/2022  5:55 PM)  Pain Rating Post Med Admin: 0 (5/19/2022  1:20 PM)  Marialuisa Score: 8 (5/19/2022  5:30 PM)

## 2022-05-19 NOTE — PLAN OF CARE
Robot time out completed with pre-incision time out.  All DaVinci instruments inspected before case by ST Narcisa.  All instruments appear intact.

## 2022-05-19 NOTE — OP NOTE
[facility]  Operative Note     Date: 05/19/2022      Pre-Op Diagnosis: right renal mass suspicious for renal cell carcinoma, PCKD      Post-Op Diagnosis: same     Procedure(s) Performed:   1.  Right robotic radical nephrectomy     Specimen(s):    Right kidney     Surgeon: Aidan Hendricks MD      Bedside assistant: DENTON Jose (no qualified resident available for bedside assistance)      Assistant: Jason Camargo MD      Anesthesia: General endotracheal anesthesia     Indications: Haroldo Dickinson Sr. is a 46 y.o. male  with right renal mass suspicious for renal cell carcinoma. After discussion of management options and risks and benefits associated with each the patient has elected to pursue surgical management.     Findings:   - 1 artery with significant calcification, stapled and clipped proximal to staple line  - multiple cystic lesions c/w PCKD  - nephrectomy otherwise performed in standard fashion     EBL: 25 mL     Drains:  None     Anesthesia: General endotracheal anesthesia     Complications:  none     Procedure in Detail: After discussion of risks and benefits of the procedure, informed consent was obtained.  The patient was brought to the operating room and placed supine on the operating table.  SCDs were applied and working prior to induction of anesthesia.  General anesthesia was administered. A 16 Fr Rossi catheter was placed in the standard fashion with 10 ml sterile water used to inflate the balloon. An OG tube was placed. The patient was then moved into the modified flank position with the right side up. The patient was appropriately padded and secured to the table. The patient was then prepped and draped in the usual sterile fashion. Timeout was performed and preoperative antibiotics were confirmed.       A Veress needle was introduced into the abdomen. Entry into the peritoneal cavity was initially confirmed with aspiration which revealed no blood or succus and subsequent  drop test. There was an initial insufflation pressure of <10mmHg.  The peritoneal cavity was insufflated up to 15 mmHg. The port sites were marked with adequate distance between sites. The location of the camera port was incised and the 8 mm trocar was introduced into the abdomen. The camera was passed through the port and the abdomen was inspected and found to be free of bowel or vascular injury. Using direct vision the other 8 mm robotic ports were placed in a vertical line starting with one just below the right costal margin and two along the lower right abdomen. A 12 mm trocar with an 8 mm reducer was placed in the left hand to utilize a robotic stapler. A 12 mm assistant port was placed in the upper midline. A 5 mm trocar was inserted for a liver retractor. Each trocar was introduced under direct vision ensuring no injury to the underlying abdominal contents. The Veress needle was removed.    Dissection began along the white line of Toldt, reflecting the colon medially away from the kidney. The hepatic reflection was released. A Kocher maneuver was performed. Care was taken to avoid injury to the duodenum.  Once the bowel was reflected, dissection was carried out just below the kidney. The gonadal vein was dissected medially, and the psoas muscle was identified. The ureter was then identified and elevated. We continued our dissection toward the lower pole of the kidney until we identified the renal hilum.    Careful dissection of the hilum was performed.  The renal vein was easily identified anteriorly. The renal artery was identified posterior and inferior to the renal vein. 1 artery was isolated circumferentially. The robotic stapler was inserted. There was significant calcification on the artery noted when clamping the artery consistent with CT imaging. A hemolock clip was used proximal to the stapler prior to firing the stapler. After artery ligation, hemostasis was excellent. The vein was then ligated with  the stapler as well.     The kidney was then freed from the remaining superior, posterior and lateral attachments. The ureter was divided. Hemostasis was again assessed and was excellent. The mass was placed into an EndoCatch bag via the 12mm midline assistant port. The robot was undocked and all trocars were removed.  The 12 mm midline incision was extended from skin down to fascia to allow removal of the specimen. This was inspected and found to be intact. This was passed off the field for pathologic analysis.     The lower 12 mm trocar site was closed using a 0 vicryl. The extraction site fascia was closed using a 1 PDS in a running fashion.  All skin incisions were closed using 4-0 monocryl. Dermabond was applied to all of the incisions.     The patient tolerated the procedure well and was transferred to the recovery room in stable condition.     Disposition: The patient will remain on the urology service overnight for observation.     Jason Camargo MD       I have reviewed the operative note performed by Dr. Camargo, and I concur with her/his documentation of Haroldo Dickinson Sr.. I was present for the critical or key portions of the procedure.

## 2022-05-19 NOTE — TRANSFER OF CARE
"Anesthesia Transfer of Care Note    Patient: Haroldo Dickinson Sr.    Procedure(s) Performed: Procedure(s) (LRB):  XI ROBOTIC NEPHRECTOMY (Right)    Patient location: PACU    Anesthesia Type: general    Transport from OR: Transported from OR on 6-10 L/min O2 by face mask with adequate spontaneous ventilation    Post pain: adequate analgesia    Post assessment: no apparent anesthetic complications and tolerated procedure well    Post vital signs: stable    Level of consciousness: awake    Nausea/Vomiting: no nausea/vomiting    Complications: none    Transfer of care protocol was followed      Last vitals:   Visit Vitals  BP (!) 147/81   Pulse 63   Temp 36.2 °C (97.1 °F) (Temporal)   Resp 17   Ht 5' 8" (1.727 m)   Wt 76 kg (167 lb 8.8 oz)   SpO2 100%   BMI 25.48 kg/m²     "

## 2022-05-19 NOTE — ANESTHESIA PROCEDURE NOTES
Right DARRYL Single Injection Block    Patient location during procedure: pre-op   Block not for primary anesthetic.  Reason for block: at surgeon's request and post-op pain management   Post-op Pain Location: Right flank/abdominal pain   Start time: 5/19/2022 12:56 PM  Timeout: 5/19/2022 12:54 PM   End time: 5/19/2022 1:00 PM    Staffing  Authorizing Provider: Corey Colin MD  Performing Provider: Juanito Rodríguez MD    Preanesthetic Checklist  Completed: patient identified, IV checked, site marked, risks and benefits discussed, surgical consent, monitors and equipment checked, pre-op evaluation and timeout performed  Peripheral Block  Patient position: sitting  Prep: ChloraPrep  Patient monitoring: heart rate, cardiac monitor, continuous pulse ox, continuous capnometry and frequent blood pressure checks  Block type: erector spinae plane  Laterality: right  Injection technique: single shot  Interspace: T8-9    Needle  Needle type: Stimuplex   Needle gauge: 20 G  Needle length: 4 in  Needle localization: anatomical landmarks and ultrasound guidance   -ultrasound image captured on disc.  Assessment  Injection assessment: negative aspiration, negative parasthesia and local visualized surrounding nerve  Paresthesia pain: none  Heart rate change: no  Slow fractionated injection: yes  Pain Tolerance: comfortable throughout block and no complaints  Medications:    Medications: bupivacaine (pf) (MARCAINE) injection 0.75% - Perineural   15 mL - 5/19/2022 1:00:00 PM  cloNIDine injection 100 mcg/mL (epidural) - Perineural   50 mcg - 5/19/2022 1:00:00 PM  dexamethasone sodium phos (PF) injection 10 mg/mL - Other   1 mg - 5/19/2022 1:00:00 PM    Additional Notes  Prior to starting, a time out was conducted. Site eulalio confirmed with team and patient. Allergies and anticoagulation status reviewed.   Vital signs stable throughout block. DOSC RN monitoring vitals throughout.   Needle advanced under continuous ultrasound  guidance.  Local anesthetic injected incrementally after confirming negative aspiration. No signs or symptoms of intravascular or intraneural injection noted.   No persistent paresthesias elicited or expressed. Patient tolerated procedure well.  30cc of 0.375% bupivacaine with epinephrine 1:300K, preservative-free dexamethasone 1mg, and clonidine 50mcg used for the block.

## 2022-05-19 NOTE — NURSING TRANSFER
Nursing Transfer Note      5/19/2022     Reason patient is being transferred: post op    Transfer To: 505    Transfer via stretcher    Transfer with IV pump    Transported by PCT    Medicines sent: none    Any special needs or follow-up needed: routine    Chart send with patient: Yes    Notified: family via surgical texting system     Patient reassessed at: 5/19/2022 6:45 PM

## 2022-05-19 NOTE — H&P
"Urology - [facility]  H&P  Staff: Aidan Hendricks MD   SUBJECTIVE:     Chief Complaint: right renal mass, PCKD    History of Present Illness:  Haroldo Dickinson Sr. is a 46 y.o. male who presents today for evaluation and management of a right renal mass.     Patient has ESRD 2/2 HTN. Does HD, MWF via a right forearm AVF.   Had incomplete evaluation on prior CT scan due to nausea and underwent a repeat CT which by the report showed no concerning lesions.  With further evaluation by the transplant team, an abdominal ultrasound showed a solid right renal mass and a questionable left renal mass.  Here to re-evaluate cysts vs mass and transplant candidacy from  perspective.  He does not urinate at all.     No FH of kidney cancer.      He had a CVA in 2013, reportedly due to uncontrolled high BP.   BP still not under good control. Denies prior abdominal surgery. No blood thinners.     Anticoagulation:  No    OBJECTIVE:     Vital Signs (Most Recent)       Estimated body mass index is 25.48 kg/m² as calculated from the following:    Height as of this encounter: 5' 8" (1.727 m).    Weight as of this encounter: 76 kg (167 lb 8.8 oz).    Physical Exam    Lab Results   Component Value Date    BUN 39 (H) 05/12/2022    CREATININE 9.0 (H) 05/12/2022    WBC 7.68 05/12/2022    HGB 10.1 (L) 05/12/2022    HCT 32.3 (L) 05/12/2022     05/12/2022    AST 17 05/12/2022    ALT 14 05/12/2022    ALKPHOS 71 05/12/2022    ALBUMIN 3.3 (L) 05/12/2022    HGBA1C 4.7 05/12/2022        Lab Results   Component Value Date    PSA 0.95 02/06/2020    PSA 0.67 02/14/2017    PSA 0.86 12/24/2015     ASSESSMENT     1. Right renal mass    PLAN:     1. To OR for right robotic nephrectomy, possible open  2. The patient is scheduled for a right robotic nephrectomy, possible open. The risks and benefits of nephrectomy were discussed with the patient in detail. The risks include but are not limited to bleeding possibly requiring a blood transfusion, " infection, pain, injury to internal organs such as lung, liver, spleen, bowel, adrenal, incomplete removal of tumor if present. Risks may vary depending on reasons for removal of kidney, persistent flank pain or hernia at area of incision, urinary infection requiring antibiotics, air embolus, pulmonary embolus and possible conversion to an open surgery. The patient will also be consented for blood. Patient understands these risks and has agreed to proceed with surgery. Consent was obtained.   3. Blood consent was obtained and signed.    Jason Camargo MD

## 2022-05-19 NOTE — PLAN OF CARE
1030-Pt thinks he is having a biopsy of right kidney.   1100-I noticed pt is chewing gum. Got him to spit it out.   1145-Resident at bedside. Pt and mother now understands he is having his right kidney removed.   1205-Report given to JOYCE Robbins RN and pt moved to the [facility] area.

## 2022-05-20 ENCOUNTER — TELEPHONE (OUTPATIENT)
Dept: UROLOGY | Facility: CLINIC | Age: 47
End: 2022-05-20
Payer: MEDICARE

## 2022-05-20 VITALS
OXYGEN SATURATION: 97 % | TEMPERATURE: 96 F | BODY MASS INDEX: 25.39 KG/M2 | RESPIRATION RATE: 18 BRPM | HEART RATE: 62 BPM | WEIGHT: 167.56 LBS | HEIGHT: 68 IN | DIASTOLIC BLOOD PRESSURE: 86 MMHG | SYSTOLIC BLOOD PRESSURE: 155 MMHG

## 2022-05-20 PROBLEM — Z90.5 S/P NEPHRECTOMY: Status: ACTIVE | Noted: 2022-05-20

## 2022-05-20 LAB
ANION GAP SERPL CALC-SCNC: 11 MMOL/L (ref 8–16)
BASOPHILS # BLD AUTO: 0.02 K/UL (ref 0–0.2)
BASOPHILS NFR BLD: 0.2 % (ref 0–1.9)
BUN SERPL-MCNC: 44 MG/DL (ref 6–20)
CALCIUM SERPL-MCNC: 8.8 MG/DL (ref 8.7–10.5)
CHLORIDE SERPL-SCNC: 100 MMOL/L (ref 95–110)
CO2 SERPL-SCNC: 27 MMOL/L (ref 23–29)
CREAT SERPL-MCNC: 11 MG/DL (ref 0.5–1.4)
DIFFERENTIAL METHOD: ABNORMAL
EOSINOPHIL # BLD AUTO: 0 K/UL (ref 0–0.5)
EOSINOPHIL NFR BLD: 0.1 % (ref 0–8)
ERYTHROCYTE [DISTWIDTH] IN BLOOD BY AUTOMATED COUNT: 14.8 % (ref 11.5–14.5)
EST. GFR  (AFRICAN AMERICAN): 5.8 ML/MIN/1.73 M^2
EST. GFR  (NON AFRICAN AMERICAN): 5 ML/MIN/1.73 M^2
GLUCOSE SERPL-MCNC: 97 MG/DL (ref 70–110)
HCT VFR BLD AUTO: 27.4 % (ref 40–54)
HGB BLD-MCNC: 8.9 G/DL (ref 14–18)
IMM GRANULOCYTES # BLD AUTO: 0.05 K/UL (ref 0–0.04)
IMM GRANULOCYTES NFR BLD AUTO: 0.5 % (ref 0–0.5)
LYMPHOCYTES # BLD AUTO: 1.3 K/UL (ref 1–4.8)
LYMPHOCYTES NFR BLD: 12.1 % (ref 18–48)
MAGNESIUM SERPL-MCNC: 2 MG/DL (ref 1.6–2.6)
MCH RBC QN AUTO: 31.1 PG (ref 27–31)
MCHC RBC AUTO-ENTMCNC: 32.5 G/DL (ref 32–36)
MCV RBC AUTO: 96 FL (ref 82–98)
MONOCYTES # BLD AUTO: 1 K/UL (ref 0.3–1)
MONOCYTES NFR BLD: 8.9 % (ref 4–15)
NEUTROPHILS # BLD AUTO: 8.5 K/UL (ref 1.8–7.7)
NEUTROPHILS NFR BLD: 78.2 % (ref 38–73)
NRBC BLD-RTO: 0 /100 WBC
PHOSPHATE SERPL-MCNC: 4.9 MG/DL (ref 2.7–4.5)
PLATELET # BLD AUTO: 286 K/UL (ref 150–450)
PMV BLD AUTO: 9.1 FL (ref 9.2–12.9)
POTASSIUM SERPL-SCNC: 4.7 MMOL/L (ref 3.5–5.1)
RBC # BLD AUTO: 2.86 M/UL (ref 4.6–6.2)
SODIUM SERPL-SCNC: 138 MMOL/L (ref 136–145)
WBC # BLD AUTO: 10.81 K/UL (ref 3.9–12.7)

## 2022-05-20 PROCEDURE — 85025 COMPLETE CBC W/AUTO DIFF WBC: CPT | Mod: NTX | Performed by: STUDENT IN AN ORGANIZED HEALTH CARE EDUCATION/TRAINING PROGRAM

## 2022-05-20 PROCEDURE — 25000003 PHARM REV CODE 250: Mod: NTX | Performed by: STUDENT IN AN ORGANIZED HEALTH CARE EDUCATION/TRAINING PROGRAM

## 2022-05-20 PROCEDURE — 63600175 PHARM REV CODE 636 W HCPCS: Mod: NTX | Performed by: STUDENT IN AN ORGANIZED HEALTH CARE EDUCATION/TRAINING PROGRAM

## 2022-05-20 PROCEDURE — 90935 PR HEMODIALYSIS, ONE EVALUATION: ICD-10-PCS | Mod: NTX,,, | Performed by: NURSE PRACTITIONER

## 2022-05-20 PROCEDURE — 94799 UNLISTED PULMONARY SVC/PX: CPT | Mod: NTX

## 2022-05-20 PROCEDURE — 84100 ASSAY OF PHOSPHORUS: CPT | Mod: NTX | Performed by: STUDENT IN AN ORGANIZED HEALTH CARE EDUCATION/TRAINING PROGRAM

## 2022-05-20 PROCEDURE — 36415 COLL VENOUS BLD VENIPUNCTURE: CPT | Mod: NTX | Performed by: STUDENT IN AN ORGANIZED HEALTH CARE EDUCATION/TRAINING PROGRAM

## 2022-05-20 PROCEDURE — 99223 1ST HOSP IP/OBS HIGH 75: CPT | Mod: 25,NTX,, | Performed by: NURSE PRACTITIONER

## 2022-05-20 PROCEDURE — 80100016 HC MAINTENANCE HEMODIALYSIS: Mod: NTX

## 2022-05-20 PROCEDURE — 80048 BASIC METABOLIC PNL TOTAL CA: CPT | Mod: NTX | Performed by: STUDENT IN AN ORGANIZED HEALTH CARE EDUCATION/TRAINING PROGRAM

## 2022-05-20 PROCEDURE — 90935 HEMODIALYSIS ONE EVALUATION: CPT | Mod: NTX,,, | Performed by: NURSE PRACTITIONER

## 2022-05-20 PROCEDURE — 99223 PR INITIAL HOSPITAL CARE,LEVL III: ICD-10-PCS | Mod: 25,NTX,, | Performed by: NURSE PRACTITIONER

## 2022-05-20 PROCEDURE — 83735 ASSAY OF MAGNESIUM: CPT | Mod: NTX | Performed by: STUDENT IN AN ORGANIZED HEALTH CARE EDUCATION/TRAINING PROGRAM

## 2022-05-20 PROCEDURE — 94761 N-INVAS EAR/PLS OXIMETRY MLT: CPT | Mod: NTX

## 2022-05-20 RX ORDER — SODIUM CHLORIDE 9 MG/ML
INJECTION, SOLUTION INTRAVENOUS ONCE
Status: DISCONTINUED | OUTPATIENT
Start: 2022-05-20 | End: 2022-05-20 | Stop reason: HOSPADM

## 2022-05-20 RX ORDER — OXYCODONE HYDROCHLORIDE 10 MG/1
10 TABLET ORAL EVERY 6 HOURS PRN
Status: DISCONTINUED | OUTPATIENT
Start: 2022-05-20 | End: 2022-05-20 | Stop reason: HOSPADM

## 2022-05-20 RX ORDER — OXYCODONE HYDROCHLORIDE 5 MG/1
5 TABLET ORAL EVERY 6 HOURS PRN
Status: DISCONTINUED | OUTPATIENT
Start: 2022-05-20 | End: 2022-05-20 | Stop reason: HOSPADM

## 2022-05-20 RX ORDER — METHOCARBAMOL 500 MG/1
500 TABLET, FILM COATED ORAL 4 TIMES DAILY
Qty: 40 TABLET | Refills: 0 | Status: SHIPPED | OUTPATIENT
Start: 2022-05-20 | End: 2022-05-30

## 2022-05-20 RX ORDER — HEPARIN SODIUM 1000 [USP'U]/ML
1000 INJECTION, SOLUTION INTRAVENOUS; SUBCUTANEOUS
Status: DISCONTINUED | OUTPATIENT
Start: 2022-05-20 | End: 2022-05-20 | Stop reason: HOSPADM

## 2022-05-20 RX ORDER — POLYETHYLENE GLYCOL 3350 17 G/17G
17 POWDER, FOR SOLUTION ORAL DAILY
Qty: 238 G | Refills: 0 | Status: SHIPPED | OUTPATIENT
Start: 2022-05-20 | End: 2022-10-20

## 2022-05-20 RX ORDER — OXYCODONE HYDROCHLORIDE 5 MG/1
5 TABLET ORAL EVERY 4 HOURS PRN
Qty: 10 TABLET | Refills: 0 | Status: ON HOLD | OUTPATIENT
Start: 2022-05-20 | End: 2022-06-14

## 2022-05-20 RX ORDER — ACETAMINOPHEN 500 MG
1000 TABLET ORAL EVERY 8 HOURS
Qty: 42 TABLET | Refills: 0 | Status: SHIPPED | OUTPATIENT
Start: 2022-05-20 | End: 2022-05-27

## 2022-05-20 RX ORDER — HYDRALAZINE HYDROCHLORIDE 20 MG/ML
10 INJECTION INTRAMUSCULAR; INTRAVENOUS ONCE
Status: COMPLETED | OUTPATIENT
Start: 2022-05-20 | End: 2022-05-20

## 2022-05-20 RX ADMIN — ASPIRIN 81 MG: 81 TABLET, COATED ORAL at 12:05

## 2022-05-20 RX ADMIN — HYDRALAZINE HYDROCHLORIDE 25 MG: 25 TABLET, FILM COATED ORAL at 12:05

## 2022-05-20 RX ADMIN — HYDRALAZINE HYDROCHLORIDE 10 MG: 20 INJECTION, SOLUTION INTRAMUSCULAR; INTRAVENOUS at 12:05

## 2022-05-20 RX ADMIN — OXYCODONE 5 MG: 5 TABLET ORAL at 06:05

## 2022-05-20 RX ADMIN — HEPARIN SODIUM 5000 UNITS: 5000 INJECTION INTRAVENOUS; SUBCUTANEOUS at 06:05

## 2022-05-20 RX ADMIN — SEVELAMER CARBONATE 800 MG: 800 TABLET, FILM COATED ORAL at 12:05

## 2022-05-20 RX ADMIN — ACETAMINOPHEN 1000 MG: 500 TABLET ORAL at 12:05

## 2022-05-20 RX ADMIN — LOSARTAN POTASSIUM 100 MG: 50 TABLET, FILM COATED ORAL at 12:05

## 2022-05-20 RX ADMIN — HEPARIN SODIUM 5000 UNITS: 5000 INJECTION INTRAVENOUS; SUBCUTANEOUS at 01:05

## 2022-05-20 RX ADMIN — TRAMADOL HYDROCHLORIDE 50 MG: 50 TABLET, COATED ORAL at 12:05

## 2022-05-20 RX ADMIN — OXYCODONE HYDROCHLORIDE 10 MG: 10 TABLET ORAL at 09:05

## 2022-05-20 RX ADMIN — METHOCARBAMOL 1000 MG: 500 TABLET ORAL at 12:05

## 2022-05-20 RX ADMIN — CARVEDILOL 25 MG: 25 TABLET, FILM COATED ORAL at 08:05

## 2022-05-20 RX ADMIN — AMLODIPINE BESYLATE 10 MG: 10 TABLET ORAL at 12:05

## 2022-05-20 NOTE — HPI
Haroldo Dickinson . is a 46 y.o. male who presents today for evaluation and management of a right renal mass.      Patient has ESRD 2/2 HTN. Does HD, MWF via a right forearm AVF.   Had incomplete evaluation on prior CT scan due to nausea and underwent a repeat CT which by the report showed no concerning lesions.  With further evaluation by the transplant team, an abdominal ultrasound showed a solid right renal mass and a questionable left renal mass.  Here to re-evaluate cysts vs mass and transplant candidacy from  perspective.  He does not urinate at all.      No FH of kidney cancer.       He had a CVA in 2013, reportedly due to uncontrolled high BP.   BP still not under good control. Denies prior abdominal surgery. No blood thinners.

## 2022-05-20 NOTE — NURSING
Pt received discharge instructions. Verbalized understanding of all instructions. Per patient, mother picking up prescriptions from outpatient pharmacy. PIV removed, no redness/swelling. Awaiting wheelchair for transport.

## 2022-05-20 NOTE — PLAN OF CARE
[facility]  Initial Discharge Assessment       Primary Care Provider: Tess Ledbetter MD    Admission Diagnosis: Renal mass [N28.89]  Preoperative testing [Z01.818]  Right renal mass [N28.89]    Admission Date: 5/19/2022  Expected Discharge Date: 5/20/2022         Payor: HU[employer]NA MANAGED MEDICARE / Plan: HUMANA SNP (SPECIAL NEEDS PLAN) / Product Type: Medicare Advantage /     Extended Emergency Contact Information  Primary Emergency Contact: Gabby Dickinson  Address: [address] [place] [address] [place]  Mobile Phone: [phone]  Relation: Mother    Discharge Plan A: Home  Discharge Plan B: Home      [id number] - [place], [place] - [address]  [address]  [place] [address]  Phone: [phone] Fax: [phone]      Initial Assessment (most recent)     Adult Discharge Assessment - 05/20/22 1341        Discharge Assessment    Assessment Type Discharge Planning Assessment     Confirmed/corrected address, phone number and insurance Yes     Confirmed Demographics Correct on Facesheet     Source of Information patient     Does patient/caregiver understand observation status Yes     Communicated QUIN with patient/caregiver Yes     Lives With alone     Do you expect to return to your current living situation? Yes     Do you have help at home or someone to help you manage your care at home? Yes     Who are your caregiver(s) and their phone number(s)? Sami Dickinson [phone]     Prior to hospitilization cognitive status: Alert/Oriented     Current cognitive status: Alert/Oriented     Walking or Climbing Stairs Difficulty none     Dressing/Bathing Difficulty none     Home Layout Able to live on 1st floor     Equipment Currently Used at Home cane, straight     Readmission within 30 days? No     Patient currently being followed by outpatient case management? No     Do you currently have service(s) that help you manage your care at home? No      Do you take prescription medications? Yes     Do you have prescription coverage? Yes     Do you have any problems affording any of your prescribed medications? No     Is the patient taking medications as prescribed? yes     Who is going to help you get home at discharge? Mother-Gabby     How do you get to doctors appointments? family or friend will provide     Are you on dialysis? Yes     Dialysis Name and Scheduled days [facility] MWF @ 11AM     Do you take coumadin? No     Discharge Plan A Home     Discharge Plan B Home               Spoke with patient at bedside to complete d/c planning assessment. Patient lives alone in a first floor apartment with no steps to enter. Patient has a Straight cane, black in Gold currently at bedside. Verified PCP, Pharmacy and Health coverage. Dialysis is MWF @ [facility] location for 11AM. Patient anticipates d/c home today with transportation and help and home from his mother.

## 2022-05-20 NOTE — NURSING
Call to pt with review of sensor and recommendations from Dr. Lynda Elizabeth as noted below. Left message asking for a return call to discuss changes. Impression:  Poor control with one episode of hypoglycemia and most glucoses >250, with mean glucoses ranging from 235 - >350. Averages throughout the 24 hours range from 285-375. Currently on 70/30 insulin 45 units before breakfast, 25 units before supper since 4/1/19. Glucoses records would suggest that she is not taking the insulin consistently BID as prescribed. Recommend increasing 70/30 insulin to 50 units before breakfast, 30 units before supper every day. Follow up glucose readings in 1-2 weeks. PT BLOOD PRESSURE /84. CALLED DR. NGUYEN ON CALL FOR UROLOGY, STATED HE WILL LOOK AT THIS ORDERS. MANUELA PEARCE

## 2022-05-20 NOTE — CONSULTS
[facility]  Nephrology  Consult Note    Patient Name: Haroldo Dickinson Sr.  MRN: [id number]  Admission Date: 5/19/2022  Hospital Length of Stay: 1 days  Attending Provider: Aidan Hendricks MD   Primary Care Physician: Tess Ledbetter MD  Principal Problem:Right renal mass    Inpatient consult to Nephrology  Consult performed by: Gianna Watkins DNP  Consult ordered by: Jason Camargo MD  Reason for consult: ESRD on HD         Subjective:     HPI: Patient has ESRD 2/2 HTN. Has been on HD x 8 years (MWF) via a right forearm AVF. Here to re-evaluate cysts vs mass and transplant candidacy from  perspective.S/p nephrectomy day 1. PMX HTN,vit D deficiency, and CVA (2018). Nephrology consulted for ESRD on HD.        Past Medical History:   Diagnosis Date    CAD (coronary artery disease), native coronary artery 11/21/2019    Cataract     Diabetes mellitus     Diabetic retinopathy     Dialysis patient     DM type 2 causing renal disease, not at goal     ESRD (end stage renal disease) started dialysis 01/2014 6/5/2014    Hyperparathyroidism, secondary renal 6/5/2014    Hypertension     NSTEMI (non-ST elevated myocardial infarction) 12/21/2013    Organ transplant candidate 6/5/2014    Pneumonia     Renal hypertension     Stroke        Past Surgical History:   Procedure Laterality Date    ANGIOGRAM, CORONARY, WITH LEFT HEART CATHETERIZATION N/A 7/1/2021    Procedure: Angiogram, Coronary, with Left Heart Cath;  Surgeon: Miki Zendejas MD;  Location: [facility] CATH LAB;  Service: Cardiology;  Laterality: N/A;    COLONOSCOPY N/A 5/3/2017    Procedure: COLONOSCOPY;  Surgeon: Rufino Carpenter MD;  Location: [facility] ENDO ([address] [place] [address]);  Service: Endoscopy;  Laterality: N/A;  pt states can only schedule on Wednesdays    COLONOSCOPY N/A 2/9/2021    Procedure: COLONOSCOPY;  Surgeon: Edil Wong MD;  Location: [facility] ENDO ([address] [place] [address]);  Service: Endoscopy;  Laterality: N/A;  Dialysis MWF/ labwork day of procedure  right arm  aceess  per Dr. Colin pt can hold Plavix 5 days prior see note- sm  COVID test on 2/6/21 at [facility] - sm    COLONOSCOPY N/A 2/10/2021    Procedure: COLONOSCOPY;  Surgeon: Robert Ayers MD;  Location: [facility] ENDO (4TH FLR);  Service: Endoscopy;  Laterality: N/A;  rescheduled due to poor bowel prep-BB  negative covid screening 2/6/21-BB  dialysis M-W-F-BB  okay to r/s for 2/9/21 and to hold Plavix per Dr. KIRAN Wong-BB  labs same day-BB    CORONARY STENT PLACEMENT N/A 7/25/2019    Procedure: INSERTION, STENT, CORONARY ARTERY;  Surgeon: Miki Zendejas MD;  Location: [facility] CATH LAB;  Service: Cardiology;  Laterality: N/A;    DIALYSIS FISTULA CREATION      FISTULOGRAM N/A 2/18/2019    Procedure: Fistulogram;  Surgeon: Yaneth De La Cruz MD;  Location: [facility] CATH LAB;  Service: Cardiology;  Laterality: N/A;    FISTULOGRAM Right 7/23/2019    Procedure: Fistulogram;  Surgeon: Oz Cordoba MD;  Location: [facility] CATH LAB;  Service: Cardiology;  Laterality: Right;    LAPAROSCOPIC ROBOT-ASSISTED SURGICAL REMOVAL OF KIDNEY USING DA JAIME XI Right 5/19/2022    Procedure: XI ROBOTIC NEPHRECTOMY;  Surgeon: Aidan Hendricks MD;  Location: [facility] OR 2ND FLR;  Service: Urology;  Laterality: Right;  3hrs    LEFT HEART CATHETERIZATION Left 7/25/2019    Procedure: Left heart cath;  Surgeon: Miki Zendejas MD;  Location: [facility] CATH LAB;  Service: Cardiology;  Laterality: Left;    RETINAL LASER PROCEDURE Bilateral 2018 or 2017    Dr. Rothman    VASCULAR SURGERY         Review of patient's allergies indicates:   Allergen Reactions    No known allergies      Current Facility-Administered Medications   Medication Frequency    0.9%  NaCl infusion Once    acetaminophen tablet 1,000 mg Q6H    amLODIPine tablet 10 mg Daily    aspirin EC tablet 81 mg Daily    carvediloL tablet 25 mg BID WM    cinacalcet tablet 90 mg Daily with breakfast    heparin (porcine) injection 1,000 Units PRN    heparin (porcine) injection 5,000  Units Q8H    hydrALAZINE tablet 25 mg BID    losartan tablet 100 mg Daily    methocarbamoL tablet 1,000 mg QID    ondansetron injection 4 mg Q6H PRN    oxyCODONE immediate release tablet 5 mg Q6H PRN    oxyCODONE immediate release tablet Tab 10 mg Q6H PRN    prochlorperazine injection Soln 5 mg Q6H PRN    sevelamer carbonate tablet 800 mg TID WM    sodium chloride 0.9% bolus 250 mL PRN    sodium chloride 0.9% flush 10 mL PRN     Facility-Administered Medications Ordered in Other Encounters   Medication Frequency    ceFAZolin injection 2 g On Call Procedure    lidocaine (PF) 10 mg/ml (1%) injection 10 mg Once     Family History       Problem Relation (Age of Onset)    Cancer Maternal Grandfather    Cataracts Mother    Diabetes Mother    Heart disease Brother    Hypertension Mother, Father, Sister, Brother    Kidney disease Brother          Tobacco Use    Smoking status: Never Smoker    Smokeless tobacco: Never Used   Substance and Sexual Activity    Alcohol use: Yes     Comment: One drink a month    Drug use: No    Sexual activity: Yes     Partners: Female     Birth control/protection: None     Review of Systems   Constitutional: Negative.    HENT: Negative.     Eyes: Negative.    Respiratory: Negative.     Cardiovascular: Negative.    Gastrointestinal: Negative.    Endocrine: Negative.    Genitourinary:  Positive for decreased urine volume.   Musculoskeletal: Negative.    Skin:  Positive for wound.   Allergic/Immunologic: Negative.    Neurological: Negative.    Hematological: Negative.    Psychiatric/Behavioral: Negative.     Objective:     Vital Signs (Most Recent):  Temp: 96.3 °F (35.7 °C) (05/20/22 0748)  Pulse: 63 (05/20/22 0900)  Resp: 18 (05/20/22 0819)  BP: (!) 165/84 (05/20/22 0900)  SpO2: 96 % (05/20/22 0748)  O2 Device (Oxygen Therapy): room air (05/20/22 0819)   Vital Signs (24h Range):  Temp:  [96.1 °F (35.6 °C)-98.1 °F (36.7 °C)] 96.3 °F (35.7 °C)  Pulse:  [54-72] 63  Resp:  [13-23]  18  SpO2:  [93 %-100 %] 96 %  BP: (131-193)/(67-96) 165/84     Weight: 76 kg (167 lb 8.8 oz) (05/19/22 1039)  Body mass index is 25.48 kg/m².  Body surface area is 1.91 meters squared.    I/O last 3 completed shifts:  In: 550 [IV Piggyback:550]  Out: -     Physical Exam    Significant Labs:  CBC:   Recent Labs   Lab 05/20/22  0754   WBC 10.81   RBC 2.86*   HGB 8.9*   HCT 27.4*      MCV 96   MCH 31.1*   MCHC 32.5     CMP:   Recent Labs   Lab 05/19/22  1123 05/19/22  1752   GLU 93 178*   CALCIUM 8.5* 8.3*   ALBUMIN 3.1*  --    PROT 7.2  --     137   K 3.8 4.4   CO2 31* 25    103   BUN 29* 32*   CREATININE 8.9* 9.3*   ALKPHOS 65  --    ALT 11  --    AST 12  --    BILITOT 0.9  --      All labs within the past 24 hours have been reviewed.        Assessment/Plan:     * Right renal mass  -Management per primary     End stage renal failure on dialysis  Nephrology History  iHD Schedule: MWF  Unit/MD: [facility]  Duration: 4hours   UF: 2-3  EDW: 75.5 kg   Access: ALBARO AVG  Residual Renal Function: none     Assessment:   - Will provide dialysis for metabolic clearance and volume management today.   - Seen on HD. No complaints.   - Plan for pt to be discharged after dialysis   - Dialysate adjusted to current labs   - Will obtain OP dialysis records  - Continue to monitor intake and output, daily weights   - Avoid nephrotoxic medication and renal dose medications to GFR  - Will continue to monitor    Anemia of Chronic Kidney Disease   - Hgb 8.9 .Goal in ESRD is Hgb of 10-11.    - Will review chronic care plan from outpatient dialysis center for LONNY dosing if pt stays IP      Mineral Bone Disease in CKD   - Recommend renal diet. Phos 3.5  - Novasource with meals  - Continue home phos binder   - Daily renal panel so that phos and albumin is monitored daily.   - Vitamin D and PTH to be checked with am labs.         Thank you for your consult. I will follow-up with patient. Please contact us if you have any  additional questions.    Gianna Watkins DNP  Nephrology  [facility] - [facility]

## 2022-05-20 NOTE — DISCHARGE INSTRUCTIONS
What to expect with your Nephrectomy  [facility]  Symptoms you may experience immediately post-op:  Bloating and/or shoulder pain if you had a laparascopic procedure- when we do this operation, we fill up your abdominal cavity with gas to better help us visualize the organs and allow our instruments to fit. After the surgery, not all the air can be removed and your body will eventually absorb this small amount of air. However this can make you feel bloated. In addition, when you sit up, the air can sit right under a muscle (the diaphragm) which has connecting nerves to the shoulders, which could explain why you have shoulder pain.  Do not expect necessarily to have gas or to have a bowel movement - this goes along with the bloating, you may feel like you want to pass gas or have a bowel movement but you cant. This is normal and you will feel like this for a couple days. There are no pills to help with this. Small walks throughout the day should help with this.  Pain - your pain should be able to be controlled with medicines by mouth that we prescribe. It is important for you to tell us if you are on any pain medications at home before the surgery as you may need stronger pain meds while in the hospital.  You can go home when:  Pain is controlled with medicines by mouth  You are able to walk without difficulty or pain  You are tolerating a regulating diet  Your are voiding. If you cannot void we may have to replace a catheter and you will follow up 3-5 days after you leave to have a voiding trial. The nurses will teach you how to care for your catheter.  When you go home:  Blood pressure  Your blood pressure must be well controlled (<140 systolic blood pressure), if youre blood pressure is not controlled, there is an increased risk of bleeding.  Activity  Continue to walk - small walks throughout the day are better than one long walk.   Do not lift anything greater than 8 pounds for 6 weeks - we want your  abdominal wall muscles to heal.  Bowel Movements - Do not strain to have a bowel movement - the pain medicines will make you constipated. That is why we also ask you to take colace 2-3 x per day to help keep your bowels regular. If you are still having trouble, then you can also add Miralax once a day. Do not take any stool softeners if you are having diarrhea.  Drain - If you have a drain (not your catheter, but a separate drain) then record the output and bring it with you to your next appointment  Smoking - If you smoke, we encourage you to STOP. Smoking interferes with the healing process and your prolong your healing with continued smoking.  Driving - Do not drive while you are on pain meds or with your catheter in place.  Bathing - If you do not have a drain, you can shower 48 hours after your surgery. If you do have a drain, sponge bathe only until the drain is out.  Dressing - you can remove the dressings if there is no drainage or change them as needed if there is. The little sterile band-aid strips will fall off on their own in 10-14 days. If they have not fallen off then you can remove them yourself.  Restarting medicines -especially blood thinners (Aspirin, Plavix, Coumadin), Fish Oil. Discuss this with your physician prior to discharge.  When to return to the ER  Fever - If you have a fever >101.5. This could be due to a number of reasons such as infection of the urine or incision. If your catheter has been removed, you could possibly have a leak. It would be best to come to the ER so they can better evaluate you.  Severe pain - pain is expected, but severe or new onset of pain is not normal.   Inability to tolerate food or liquid with nausea and vomiting - it would be best to go to the ER for them to better evaluate you.

## 2022-05-20 NOTE — PROGRESS NOTES
Hd txt complete.  1L UF removed.  Needles removed & dressing applied.  Tolerated well.      Pt transported back to his room on a stretcher.

## 2022-05-20 NOTE — TELEPHONE ENCOUNTER
Discharge       IP AVS (Printed 5/20/2022)          Follow-Ups: Follow up with Aidan Hendricks MD (Urology) on 7/12/2022        ----- Message from Fili Marcelino MD sent at 5/20/2022  2:30 PM CDT -----  Please make follow up appointment for the attached patient.     Thank you,  Papa Marcelino

## 2022-05-20 NOTE — NURSING
PAGED DR. NGUYEN WITH UROLOGY ABOUT BLOOD NEWKGUAQ039/85. NO ORDERS RECEIVED FROM DR. NGUYEN. MANUELA PEARCE

## 2022-05-20 NOTE — SUBJECTIVE & OBJECTIVE
Interval History: NAEON. HTN requiring prn medication in addition to home meds. Some abdominal pain not adequately controlled.       Objective:     Temp:  [96.1 °F (35.6 °C)-98.1 °F (36.7 °C)] 96.1 °F (35.6 °C)  Pulse:  [54-72] 72  Resp:  [13-23] 18  SpO2:  [93 %-100 %] 97 %  BP: (131-186)/(67-91) 166/85     Body mass index is 25.48 kg/m².           Drains       Drain  Duration                  Hemodialysis AV Fistula Right forearm -- days                    Physical Exam  Vitals reviewed.   Constitutional:       General: He is not in acute distress.     Appearance: He is well-developed. He is not diaphoretic.   HENT:      Head: Normocephalic and atraumatic.   Eyes:      General: No scleral icterus.  Cardiovascular:      Rate and Rhythm: Normal rate.   Pulmonary:      Effort: Pulmonary effort is normal. No respiratory distress.      Breath sounds: No stridor.   Abdominal:      General: There is no distension.      Palpations: Abdomen is soft.      Tenderness: There is abdominal tenderness (Appropriate).      Comments: Incisions c/d/I  Appropriately tender to palpation   Musculoskeletal:         General: Normal range of motion.      Cervical back: Normal range of motion.   Skin:     General: Skin is warm and dry.   Neurological:      Mental Status: He is alert.   Psychiatric:         Behavior: Behavior normal.       Significant Labs:    BMP:  Recent Labs   Lab 05/19/22  1123 05/19/22  1752    137   K 3.8 4.4    103   CO2 31* 25   BUN 29* 32*   CREATININE 8.9* 9.3*   CALCIUM 8.5* 8.3*       CBC:   Recent Labs   Lab 05/19/22  1752   WBC 11.59   HGB 8.6*   HCT 26.9*          Blood Culture: No results for input(s): LABBLOO in the last 168 hours.  Urine Culture: No results for input(s): LABURIN in the last 168 hours.  Urine Studies: No results for input(s): COLORU, APPEARANCEUA, PHUR, SPECGRAV, PROTEINUA, GLUCUA, KETONESU, BILIRUBINUA, OCCULTUA, NITRITE, UROBILINOGEN, LEUKOCYTESUR, RBCUA, WBCUA,  BACTERIA, SQUAMEPITHEL, HYALINECASTS in the last 168 hours.    Invalid input(s): MIGUEL    Significant Imaging:  All pertinent imaging results/findings from the past 24 hours have been reviewed.

## 2022-05-20 NOTE — PROGRESS NOTES
[facility]  Urology  Progress Note    Patient Name: Haroldo Dickinson Sr.  MRN: [id number]  Admission Date: 5/19/2022  Hospital Length of Stay: 1 days  Code Status: Prior   Attending Provider: Aidan Hendricks MD   Primary Care Physician: Tess Ledbetter MD    Subjective:     HPI:  Haroldo Dickinson Sr. is a 46 y.o. male who presents today for evaluation and management of a right renal mass.      Patient has ESRD 2/2 HTN. Does HD, MWF via a right forearm AVF.   Had incomplete evaluation on prior CT scan due to nausea and underwent a repeat CT which by the report showed no concerning lesions.  With further evaluation by the transplant team, an abdominal ultrasound showed a solid right renal mass and a questionable left renal mass.  Here to re-evaluate cysts vs mass and transplant candidacy from  perspective.  He does not urinate at all.      No FH of kidney cancer.       He had a CVA in 2013, reportedly due to uncontrolled high BP.   BP still not under good control. Denies prior abdominal surgery. No blood thinners.       Interval History: NAEON. HTN requiring prn medication in addition to home meds. Some abdominal pain not adequately controlled.       Objective:     Temp:  [96.1 °F (35.6 °C)-98.1 °F (36.7 °C)] 96.1 °F (35.6 °C)  Pulse:  [54-72] 72  Resp:  [13-23] 18  SpO2:  [93 %-100 %] 97 %  BP: (131-186)/(67-91) 166/85     Body mass index is 25.48 kg/m².           Drains       Drain  Duration                  Hemodialysis AV Fistula Right forearm -- days                    Physical Exam  Vitals reviewed.   Constitutional:       General: He is not in acute distress.     Appearance: He is well-developed. He is not diaphoretic.   HENT:      Head: Normocephalic and atraumatic.   Eyes:      General: No scleral icterus.  Cardiovascular:      Rate and Rhythm: Normal rate.   Pulmonary:      Effort: Pulmonary effort is normal. No respiratory distress.      Breath sounds: No stridor.   Abdominal:      General: There is no  distension.      Palpations: Abdomen is soft.      Tenderness: There is abdominal tenderness (Appropriate).      Comments: Incisions c/d/I  Appropriately tender to palpation   Musculoskeletal:         General: Normal range of motion.      Cervical back: Normal range of motion.   Skin:     General: Skin is warm and dry.   Neurological:      Mental Status: He is alert.   Psychiatric:         Behavior: Behavior normal.       Significant Labs:    BMP:  Recent Labs   Lab 05/19/22  1123 05/19/22  1752    137   K 3.8 4.4    103   CO2 31* 25   BUN 29* 32*   CREATININE 8.9* 9.3*   CALCIUM 8.5* 8.3*       CBC:   Recent Labs   Lab 05/19/22  1752   WBC 11.59   HGB 8.6*   HCT 26.9*          Blood Culture: No results for input(s): LABBLOO in the last 168 hours.  Urine Culture: No results for input(s): LABURIN in the last 168 hours.  Urine Studies: No results for input(s): COLORU, APPEARANCEUA, PHUR, SPECGRAV, PROTEINUA, GLUCUA, KETONESU, BILIRUBINUA, OCCULTUA, NITRITE, UROBILINOGEN, LEUKOCYTESUR, RBCUA, WBCUA, BACTERIA, SQUAMEPITHEL, HYALINECASTS in the last 168 hours.    Invalid input(s): WRIGHTSUR    Significant Imaging:  All pertinent imaging results/findings from the past 24 hours have been reviewed.                    Assessment/Plan:     S/p nephrectomy  POD 1 s/p robotic right nephrectomy.  - Continue diabetic diet  - Heparin for DVT ppx  - Maintain SCDs  - Continue prn pain and nausea control  - Continue to encourage ambulation  - Continue IS use  - AM labs pending  - HD today  - dispo: likely home today pending labs, HD         VTE Risk Mitigation (From admission, onward)         Ordered     heparin (porcine) injection 5,000 Units  Every 8 hours         05/19/22 1648     IP VTE HIGH RISK PATIENT  Once         05/19/22 1648     Place AMANUEL hose  Until discontinued         05/19/22 1648     Place sequential compression device  Until discontinued         05/19/22 1648                Fili Marcelino,  MD  Urology  [facility]

## 2022-05-20 NOTE — ASSESSMENT & PLAN NOTE
Nephrology History  iHD Schedule: MWF  Unit/MD: [facility]  Duration: 4hours   UF: 2-3  EDW: 75.5 kg   Access: ALBARO AVG  Residual Renal Function: none     Assessment:   - Will provide dialysis for metabolic clearance and volume management today.   - Seen on HD. No complaints.   - Plan for pt to be discharged after dialysis   - Dialysate adjusted to current labs   - Will obtain OP dialysis records  - Continue to monitor intake and output, daily weights   - Avoid nephrotoxic medication and renal dose medications to GFR  - Will continue to monitor    Anemia of Chronic Kidney Disease   - Hgb 8.9 .Goal in ESRD is Hgb of 10-11.    - Will review chronic care plan from outpatient dialysis center for LONNY dosing if pt stays IP      Mineral Bone Disease in CKD   - Recommend renal diet. Phos 3.5  - Novasource with meals  - Continue home phos binder   - Daily renal panel so that phos and albumin is monitored daily.   - Vitamin D and PTH to be checked with am labs.

## 2022-05-20 NOTE — DISCHARGE SUMMARY
[facility]  Urology  Discharge Summary      Patient Name: Haroldo Dickinson Sr.  MRN: [id number]  Admission Date: 5/19/2022  Hospital Length of Stay: 1 days  Discharge Date and Time:  05/20/2022 1:21 PM  Attending Physician: Aidan Hendricks MD   Discharging Provider: Fili Marcelino MD  Primary Care Physician: Tess Ledbetter MD    HPI:   Haroldo Dickinson Sr. is a 46 y.o. male who presents today for evaluation and management of a right renal mass.      Patient has ESRD 2/2 HTN. Does HD, MWF via a right forearm AVF.   Had incomplete evaluation on prior CT scan due to nausea and underwent a repeat CT which by the report showed no concerning lesions.  With further evaluation by the transplant team, an abdominal ultrasound showed a solid right renal mass and a questionable left renal mass.  Here to re-evaluate cysts vs mass and transplant candidacy from  perspective.  He does not urinate at all.      No FH of kidney cancer.       He had a CVA in 2013, reportedly due to uncontrolled high BP.   BP still not under good control. Denies prior abdominal surgery. No blood thinners.        Procedure(s) (LRB):  XI ROBOTIC NEPHRECTOMY (Right)     Indwelling Lines/Drains at time of discharge:   Lines/Drains/Airways     Drain  Duration                Hemodialysis AV Fistula Right forearm -- days                Hospital Course (synopsis of major diagnoses, care, treatment, and services provided during the course of the hospital stay):    Patient was admitted to the hospital for procedures as described above, please see operative note for full details. Patient tolerated the procedure well, and was taken to PACU postoperatively for observation during their continued recovery from anesthesia. After an appropriate duration of observation, patient was deemed stable for transfer to the floor to continue their recovery. The postoperative course was largely normal. Patient was able to ambulate normally. Patient was able to tolerate  prescribed diet. Nausea and pain were controlled with oral medications. Patient went to dialysis on POD 1, after dialysis patient was deemed stable for discharge from a nephrology standpoint. Patient was deemed stable for discharge on POD 1 and was discharged with appropriate medications, instructions, and follow up.    Medications and instructions as below.  For more thorough information, please refer to the hospital record and operative report.    Consults:   Consults (From admission, onward)        Status Ordering Provider     IP consult to case management  Once        Provider:  (Not yet assigned)    SOUMYA Mustafa     Inpatient consult to Nephrology  Once        Provider:  (Not yet assigned)    SHIRA Lennon          Significant Diagnostic Studies:     Pending Diagnostic Studies:     Procedure Component Value Units Date/Time    Specimen to Pathology, Surgery Urology [[id number]] Collected: 05/19/22 1609    Order Status: Sent Lab Status: In process Updated: 05/20/22 0112    Specimen: Tissue           Final Active Diagnoses:    Diagnosis Date Noted POA    PRINCIPAL PROBLEM:  Right renal mass [N28.89] 05/19/2022 Yes    S/p nephrectomy [Z90.5] 05/20/2022 Not Applicable    Controlled type 2 diabetes mellitus with both eyes affected by proliferative retinopathy and macular edema, without long-term current use of insulin [E11.3513] 08/24/2021 Yes    Hypertensive retinopathy, bilateral [H35.033] 08/24/2021 Yes    Patient on waiting list for kidney transplant [Z76.82] 02/06/2020 Yes     Chronic    CAD (coronary artery disease), native coronary artery [I25.10] 11/21/2019 Yes    Essential hypertension [I10] 11/21/2019 Yes    Mixed hyperlipidemia [E78.2] 11/21/2019 Yes    Renal hypertension [I12.9]  Yes     Chronic    Hemiparesis affecting right side as late effect of cerebrovascular accident [I69.351] 02/15/2016 Not Applicable    Abnormality of gait following cerebrovascular accident  (CVA) [I69.398, R26.9] 02/15/2016 Not Applicable    End stage renal failure on dialysis [N18.6, Z99.2] 06/05/2014 Not Applicable     Chronic    Hyperparathyroidism, secondary renal [N25.81] 06/05/2014 Yes    Left ventricular hypertrophy due to hypertensive disease [I11.9] 06/05/2014 Yes     Chronic    Cerebral microvascular disease [I67.89] 12/23/2013 Yes    Controlled type 2 diabetes mellitus with CKD [E11.22]  Yes     Chronic      Problems Resolved During this Admission:       Discharged Condition: good    Disposition:  home    Follow Up:   Follow-up Information     Aidan Hendricks MD Follow up on 7/12/2022.    Specialty: Urology  Contact information:  [address] [facility] [address]  [phone]                       Patient Instructions:      CBC Without Differential   Standing Status: Future Number of Occurrences: 1 Standing Exp. Date: 06/11/23     Comprehensive Metabolic Panel   Standing Status: Future Number of Occurrences: 1 Standing Exp. Date: 06/11/23     Hemoglobin A1C   Standing Status: Future Number of Occurrences: 1 Standing Exp. Date: 06/11/23     Notify your health care provider if you experience any of the following:  temperature >100.4     Notify your health care provider if you experience any of the following:  persistent nausea and vomiting or diarrhea     Notify your health care provider if you experience any of the following:  severe uncontrolled pain     Notify your health care provider if you experience any of the following:  difficulty breathing or increased cough     Notify your health care provider if you experience any of the following:  severe persistent headache     Notify your health care provider if you experience any of the following:  persistent dizziness, light-headedness, or visual disturbances     Notify your health care provider if you experience any of the following:  increased confusion or weakness     Lifting restrictions   Order Comments: Please do not lift  more than 10 pounds for at least 6 weeks. Reassess at follow up clinic visit.     No driving until:   Order Comments: No driving while taking opioid pain medications.     No dressing needed   Order Comments: There is dermabond, a surgical adhesive, on your incisions. Do not pick or peel, allow to flake on its own.     EKG 12-lead   Standing Status: Future Number of Occurrences: 1 Standing Exp. Date: 04/12/23     Type & Screen   Standing Status: Future Number of Occurrences: 1 Standing Exp. Date: 06/11/23     Shower on day dressing removed (No bath)   Order Comments: Okay to shower 48 hours after surgery. Gentle shower daily with mild soap and water. Please do not soak or submerge in water for at least 6 weeks. Reassess at follow up clinic visit.     Medications:  Reconciled Home Medications:      Medication List      START taking these medications    acetaminophen 500 MG tablet  Commonly known as: TYLENOL  Take 2 tablets (1,000 mg total) by mouth every 8 (eight) hours. for 7 days     methocarbamoL 500 MG Tab  Commonly known as: ROBAXIN  Take 1 tablet (500 mg total) by mouth 4 (four) times daily. for 10 days     oxyCODONE 5 MG immediate release tablet  Commonly known as: ROXICODONE  Take 1 tablet (5 mg total) by mouth every 4 (four) hours as needed for Pain.     polyethylene glycol 17 gram/dose powder  Commonly known as: GLYCOLAX  Mix 1 capful (17 grams) in liquid and drink by mouth once daily.        CONTINUE taking these medications    amLODIPine 10 MG tablet  Commonly known as: NORVASC  Take 1 tablet (10 mg total) by mouth once daily.     aspirin 81 MG EC tablet  Commonly known as: ECOTRIN  Take 1 tablet (81 mg total) by mouth once daily.     AURYXIA 210 mg iron Tab  Generic drug: ferric citrate     calcium acetate(phosphat bind) 667 mg capsule  Commonly known as: PHOSLO  Take 1,334 mg by mouth 3 (three) times daily with meals.     carvediloL 25 MG tablet  Commonly known as: COREG  Take 1 tablet (25 mg total) by  mouth 2 (two) times daily with meals.     * cinacalcet 30 MG Tab  Commonly known as: SENSIPAR  90 mg once daily.     * cinacalcet 60 MG Tab  Commonly known as: SENSIPAR     doxercalciferoL 2 mcg/mL Soln  2 mcg.     ethyl chloride 100% 100 % spray  SPRAY SMALL AMOUNT TO ACCESS SITE THREE TIMES A WEEK ON DIALYSIS DAYS     hydrALAZINE 25 MG tablet  Commonly known as: APRESOLINE  Take 25 mg by mouth 2 (two) times daily.     losartan 100 MG tablet  Commonly known as: COZAAR  Take 100 mg by mouth once daily.     MIRCERA INJ  75 mcg.     sevelamer carbonate 800 mg Tab  Commonly known as: RENVELA     UNABLE TO FIND  medication name: Dr Ramires's sleep aid     VELPHORO 500 mg Chew  Generic drug: sucroferric oxyhydroxide  Take 2 tablets by mouth.         * This list has 2 medication(s) that are the same as other medications prescribed for you. Read the directions carefully, and ask your doctor or other care provider to review them with you.            STOP taking these medications    atorvastatin 40 MG tablet  Commonly known as: LIPITOR     ondansetron 4 MG tablet  Commonly known as: ZOFRAN            Time spent on the discharge of patient: 15 minutes    Fili Marcelino MD  Urology  [facility] - Surgery

## 2022-05-20 NOTE — ASSESSMENT & PLAN NOTE
POD 1 s/p robotic right nephrectomy.  - Continue diabetic diet  - Heparin for DVT ppx  - Maintain SCDs  - Continue prn pain and nausea control  - Continue to encourage ambulation  - Continue IS use  - AM labs pending  - HD today  - dispo: likely home today pending labs, HD

## 2022-05-20 NOTE — PROGRESS NOTES
ESRD MWF Maintenance HD initiated to RLA fistula.  Tolerated well.        Pt arrived to [facility] on a stretcher.

## 2022-05-20 NOTE — NURSING
Pt returned from dialysis via stretcher. Transferred to bed without difficulty. VSS and patient resting comfortably. Will continue to monitor.

## 2022-05-20 NOTE — SUBJECTIVE & OBJECTIVE
Past Medical History:   Diagnosis Date    CAD (coronary artery disease), native coronary artery 11/21/2019    Cataract     Diabetes mellitus     Diabetic retinopathy     Dialysis patient     DM type 2 causing renal disease, not at goal     ESRD (end stage renal disease) started dialysis 01/2014 6/5/2014    Hyperparathyroidism, secondary renal 6/5/2014    Hypertension     NSTEMI (non-ST elevated myocardial infarction) 12/21/2013    Organ transplant candidate 6/5/2014    Pneumonia     Renal hypertension     Stroke        Past Surgical History:   Procedure Laterality Date    ANGIOGRAM, CORONARY, WITH LEFT HEART CATHETERIZATION N/A 7/1/2021    Procedure: Angiogram, Coronary, with Left Heart Cath;  Surgeon: Miki Zendejas MD;  Location: [facility] CATH LAB;  Service: Cardiology;  Laterality: N/A;    COLONOSCOPY N/A 5/3/2017    Procedure: COLONOSCOPY;  Surgeon: Rufino Carpenter MD;  [facility]  Service: Endoscopy;  Laterality: N/A;  pt states can only schedule on Wednesdays    COLONOSCOPY N/A 2/9/2021    Procedure: COLONOSCOPY;  Surgeon: Edil Wong MD;  [facility]  Service: Endoscopy;  Laterality: N/A;  Dialysis MWF/ labwork day of procedure  right arm aceess  per Dr. Colin pt can hold Plavix 5 days prior see note- sm  COVID test on 2/6/21 at [facility] - sm    COLONOSCOPY N/A 2/10/2021    Procedure: COLONOSCOPY;  Surgeon: Robert Ayers MD;  [facility]  Service: Endoscopy;  Laterality: N/A;  rescheduled due to poor bowel prep-BB  negative covid screening 2/6/21-BB  dialysis M-W-F-BB  okay to r/s for 2/9/21 and to hold Plavix per Dr. KIRAN Wong-BB  labs same day-BB    CORONARY STENT PLACEMENT N/A 7/25/2019    Procedure: INSERTION, STENT, CORONARY ARTERY;  Surgeon: Miki Zendejas MD;  Location: [facility] CATH LAB;  Service: Cardiology;  Laterality: N/A;    DIALYSIS FISTULA CREATION      FISTULOGRAM N/A 2/18/2019    Procedure: Fistulogram;  Surgeon: Yaneth De La Cruz MD;   Location: [facility] CATH LAB;  Service: Cardiology;  Laterality: N/A;    FISTULOGRAM Right 7/23/2019    Procedure: Fistulogram;  Surgeon: Oz Cordoba MD;  Location: [facility] CATH LAB;  Service: Cardiology;  Laterality: Right;    LAPAROSCOPIC ROBOT-ASSISTED SURGICAL REMOVAL OF KIDNEY USING DA JAIME XI Right 5/19/2022    Procedure: XI ROBOTIC NEPHRECTOMY;  Surgeon: Aidan Hendricks MD;  [facility];  Service: Urology;  Laterality: Right;  3hrs    LEFT HEART CATHETERIZATION Left 7/25/2019    Procedure: Left heart cath;  Surgeon: Miki Zendejas MD;  Location: [facility] CATH LAB;  Service: Cardiology;  Laterality: Left;    RETINAL LASER PROCEDURE Bilateral 2018 or 2017    Dr. Rothman    VASCULAR SURGERY         Review of patient's allergies indicates:   Allergen Reactions    No known allergies      Current Facility-Administered Medications   Medication Frequency    0.9%  NaCl infusion Once    acetaminophen tablet 1,000 mg Q6H    amLODIPine tablet 10 mg Daily    aspirin EC tablet 81 mg Daily    carvediloL tablet 25 mg BID WM    cinacalcet tablet 90 mg Daily with breakfast    heparin (porcine) injection 1,000 Units PRN    heparin (porcine) injection 5,000 Units Q8H    hydrALAZINE tablet 25 mg BID    losartan tablet 100 mg Daily    methocarbamoL tablet 1,000 mg QID    ondansetron injection 4 mg Q6H PRN    oxyCODONE immediate release tablet 5 mg Q6H PRN    oxyCODONE immediate release tablet Tab 10 mg Q6H PRN    prochlorperazine injection Soln 5 mg Q6H PRN    sevelamer carbonate tablet 800 mg TID WM    sodium chloride 0.9% bolus 250 mL PRN    sodium chloride 0.9% flush 10 mL PRN     Facility-Administered Medications Ordered in Other Encounters   Medication Frequency    ceFAZolin injection 2 g On Call Procedure    lidocaine (PF) 10 mg/ml (1%) injection 10 mg Once     Family History       Problem Relation (Age of Onset)    Cancer Maternal Grandfather    Cataracts Mother    Diabetes Mother    Heart disease Brother     Hypertension Mother, Father, Sister, Brother    Kidney disease Brother          Tobacco Use    Smoking status: Never Smoker    Smokeless tobacco: Never Used   Substance and Sexual Activity    Alcohol use: Yes     Comment: One drink a month    Drug use: No    Sexual activity: Yes     Partners: Female     Birth control/protection: None     Review of Systems   Constitutional: Negative.    HENT: Negative.     Eyes: Negative.    Respiratory: Negative.     Cardiovascular: Negative.    Gastrointestinal: Negative.    Endocrine: Negative.    Genitourinary:  Positive for decreased urine volume.   Musculoskeletal: Negative.    Skin:  Positive for wound.   Allergic/Immunologic: Negative.    Neurological: Negative.    Hematological: Negative.    Psychiatric/Behavioral: Negative.     Objective:     Vital Signs (Most Recent):  Temp: 96.3 °F (35.7 °C) (05/20/22 0748)  Pulse: 63 (05/20/22 0900)  Resp: 18 (05/20/22 0819)  BP: (!) 165/84 (05/20/22 0900)  SpO2: 96 % (05/20/22 0748)  O2 Device (Oxygen Therapy): room air (05/20/22 0819)   Vital Signs (24h Range):  Temp:  [96.1 °F (35.6 °C)-98.1 °F (36.7 °C)] 96.3 °F (35.7 °C)  Pulse:  [54-72] 63  Resp:  [13-23] 18  SpO2:  [93 %-100 %] 96 %  BP: (131-193)/(67-96) 165/84     Weight: 76 kg (167 lb 8.8 oz) (05/19/22 1039)  Body mass index is 25.48 kg/m².  Body surface area is 1.91 meters squared.    I/O last 3 completed shifts:  In: 550 [IV Piggyback:550]  Out: -     Physical Exam    Significant Labs:  CBC:   Recent Labs   Lab 05/20/22  0754   WBC 10.81   RBC 2.86*   HGB 8.9*   HCT 27.4*      MCV 96   MCH 31.1*   MCHC 32.5     CMP:   Recent Labs   Lab 05/19/22  1123 05/19/22  1752   GLU 93 178*   CALCIUM 8.5* 8.3*   ALBUMIN 3.1*  --    PROT 7.2  --     137   K 3.8 4.4   CO2 31* 25    103   BUN 29* 32*   CREATININE 8.9* 9.3*   ALKPHOS 65  --    ALT 11  --    AST 12  --    BILITOT 0.9  --      All labs within the past 24 hours have been reviewed.

## 2022-05-20 NOTE — NURSING
PT ARRIVED TO THE FLOOR. C/O PAIN, RELEASED ORDERS ,NOTHING EXCEPT TYLENOL ORDERED. CALL PROVIDER ON CALL, DR. NGUYEN WAS NOTIFIED. PT ALSO IS A DIALYSIS MPT, NOTIFIED PROVIDER OF IVF GOING AT 125ML/HR. MANUELA PEARCE

## 2022-05-20 NOTE — HPI
Patient has ESRD 2/2 HTN. Has been on HD x 8 years (MWF) via a right forearm AVF. Here to re-evaluate cysts vs mass and transplant candidacy from  perspective.S/p nephrectomy day 1. PMX HTN,vit D deficiency, and CVA (2018). Nephrology consulted for ESRD on HD.

## 2022-05-23 NOTE — PLAN OF CARE
[facility]  Discharge Final Note    Primary Care Provider: Tess Ledbetter MD    Expected Discharge Date: 5/20/2022    Final Discharge Note (most recent)     Final Note - 05/23/22 1452        Final Note    Assessment Type Final Discharge Note     Anticipated Discharge Disposition Home or Self Care     What phone number can be called within the next 1-3 days to see how you are doing after discharge? --   [phone]    Hospital Resources/Appts/Education Provided Appointments scheduled and added to AVS                 Important Message from Medicare             Contact Info     Aidan Hendricks MD   Specialty: Urology    [address]  [facility] [address]   Phone: [phone]       Next Steps: Follow up on 7/12/2022          Future Appointments   Date Time Provider Department Center   5/24/2022 10:00 AM Aisha Olsen DPM [facility] POD [facility]   7/7/2022  9:00 AM [facility]   7/12/2022 10:30 AM Aidan Hendricks MD [facility] UROLOG [facility]     Patient discharged home to care of family on 5/20/22.

## 2022-05-24 DIAGNOSIS — Z76.82 ORGAN TRANSPLANT CANDIDATE: Primary | ICD-10-CM

## 2022-06-01 NOTE — ADDENDUM NOTE
Addendum  created 06/01/22 0815 by Corey Colin MD    Attestation recorded in Intraprocedure, Intraprocedure Attestations filed

## 2022-06-02 ENCOUNTER — TELEPHONE (OUTPATIENT)
Dept: TRANSPLANT | Facility: CLINIC | Age: 47
End: 2022-06-02
Payer: MEDICARE

## 2022-06-08 LAB
FINAL PATHOLOGIC DIAGNOSIS: NORMAL
Lab: NORMAL

## 2022-06-13 ENCOUNTER — HOSPITAL ENCOUNTER (OUTPATIENT)
Facility: HOSPITAL | Age: 47
Discharge: HOME | End: 2022-06-14
Attending: EMERGENCY MEDICINE | Admitting: INTERNAL MEDICINE
Payer: MEDICARE

## 2022-06-13 DIAGNOSIS — N18.6 ANEMIA IN ESRD (END-STAGE RENAL DISEASE): ICD-10-CM

## 2022-06-13 DIAGNOSIS — D63.1 ANEMIA IN ESRD (END-STAGE RENAL DISEASE): ICD-10-CM

## 2022-06-13 DIAGNOSIS — R07.9 CHEST PAIN: ICD-10-CM

## 2022-06-13 DIAGNOSIS — E87.70 VOLUME OVERLOAD: ICD-10-CM

## 2022-06-13 DIAGNOSIS — D72.829 LEUKOCYTOSIS, UNSPECIFIED TYPE: Primary | ICD-10-CM

## 2022-06-13 PROBLEM — R79.89 ELEVATED TROPONIN: Status: ACTIVE | Noted: 2022-06-13

## 2022-06-13 PROBLEM — Z76.82 PATIENT ON WAITING LIST FOR KIDNEY TRANSPLANT: Status: ACTIVE | Noted: 2020-02-06

## 2022-06-13 PROBLEM — I16.0 HYPERTENSIVE URGENCY: Status: ACTIVE | Noted: 2022-06-13

## 2022-06-13 LAB
ALBUMIN SERPL BCP-MCNC: 2.9 G/DL (ref 3.5–5.2)
ALP SERPL-CCNC: 72 U/L (ref 55–135)
ALT SERPL W/O P-5'-P-CCNC: 9 U/L (ref 10–44)
ANION GAP SERPL CALC-SCNC: 15 MMOL/L (ref 8–16)
AST SERPL-CCNC: 14 U/L (ref 10–40)
BASOPHILS # BLD AUTO: 0.11 K/UL (ref 0–0.2)
BASOPHILS NFR BLD: 0.8 % (ref 0–1.9)
BILIRUB SERPL-MCNC: 1.1 MG/DL (ref 0.1–1)
BUN SERPL-MCNC: 46 MG/DL (ref 6–20)
CALCIUM SERPL-MCNC: 8.9 MG/DL (ref 8.7–10.5)
CHLORIDE SERPL-SCNC: 98 MMOL/L (ref 95–110)
CO2 SERPL-SCNC: 27 MMOL/L (ref 23–29)
CREAT SERPL-MCNC: 12.8 MG/DL (ref 0.5–1.4)
CTP QC/QA: YES
DIFFERENTIAL METHOD: ABNORMAL
EOSINOPHIL # BLD AUTO: 0.4 K/UL (ref 0–0.5)
EOSINOPHIL NFR BLD: 3 % (ref 0–8)
ERYTHROCYTE [DISTWIDTH] IN BLOOD BY AUTOMATED COUNT: 15.5 % (ref 11.5–14.5)
EST. GFR  (AFRICAN AMERICAN): 4.8 ML/MIN/1.73 M^2
EST. GFR  (NON AFRICAN AMERICAN): 4.2 ML/MIN/1.73 M^2
GLUCOSE SERPL-MCNC: 85 MG/DL (ref 70–110)
HCT VFR BLD AUTO: 26.4 % (ref 40–54)
HGB BLD-MCNC: 8.4 G/DL (ref 14–18)
IMM GRANULOCYTES # BLD AUTO: 0.05 K/UL (ref 0–0.04)
IMM GRANULOCYTES NFR BLD AUTO: 0.4 % (ref 0–0.5)
LYMPHOCYTES # BLD AUTO: 1.3 K/UL (ref 1–4.8)
LYMPHOCYTES NFR BLD: 9.6 % (ref 18–48)
MAGNESIUM SERPL-MCNC: 2 MG/DL (ref 1.6–2.6)
MCH RBC QN AUTO: 31.6 PG (ref 27–31)
MCHC RBC AUTO-ENTMCNC: 31.8 G/DL (ref 32–36)
MCV RBC AUTO: 99 FL (ref 82–98)
MONOCYTES # BLD AUTO: 1.5 K/UL (ref 0.3–1)
MONOCYTES NFR BLD: 11.3 % (ref 4–15)
NEUTROPHILS # BLD AUTO: 9.9 K/UL (ref 1.8–7.7)
NEUTROPHILS NFR BLD: 74.9 % (ref 38–73)
NRBC BLD-RTO: 0 /100 WBC
PHOSPHATE SERPL-MCNC: 2.3 MG/DL (ref 2.7–4.5)
PLATELET # BLD AUTO: 437 K/UL (ref 150–450)
PMV BLD AUTO: 10.1 FL (ref 9.2–12.9)
POTASSIUM SERPL-SCNC: 4.1 MMOL/L (ref 3.5–5.1)
PROT SERPL-MCNC: 7.6 G/DL (ref 6–8.4)
RBC # BLD AUTO: 2.66 M/UL (ref 4.6–6.2)
SARS-COV-2 RDRP RESP QL NAA+PROBE: NEGATIVE
SODIUM SERPL-SCNC: 140 MMOL/L (ref 136–145)
TROPONIN I SERPL DL<=0.01 NG/ML-MCNC: 0.14 NG/ML (ref 0–0.03)
WBC # BLD AUTO: 13.19 K/UL (ref 3.9–12.7)

## 2022-06-13 PROCEDURE — 99285 EMERGENCY DEPT VISIT HI MDM: CPT | Mod: NTX,CS,, | Performed by: EMERGENCY MEDICINE

## 2022-06-13 PROCEDURE — 84484 ASSAY OF TROPONIN QUANT: CPT | Mod: NTX | Performed by: STUDENT IN AN ORGANIZED HEALTH CARE EDUCATION/TRAINING PROGRAM

## 2022-06-13 PROCEDURE — 93010 ELECTROCARDIOGRAM REPORT: CPT | Mod: NTX,,, | Performed by: INTERNAL MEDICINE

## 2022-06-13 PROCEDURE — 36415 COLL VENOUS BLD VENIPUNCTURE: CPT | Mod: NTX | Performed by: STUDENT IN AN ORGANIZED HEALTH CARE EDUCATION/TRAINING PROGRAM

## 2022-06-13 PROCEDURE — 25000003 PHARM REV CODE 250: Mod: NTX

## 2022-06-13 PROCEDURE — 25000003 PHARM REV CODE 250: Mod: NTX | Performed by: EMERGENCY MEDICINE

## 2022-06-13 PROCEDURE — 99220 PR INITIAL OBSERVATION CARE,LEVL III: ICD-10-PCS | Mod: GC,NTX,, | Performed by: INTERNAL MEDICINE

## 2022-06-13 PROCEDURE — 87389 HIV-1 AG W/HIV-1&-2 AB AG IA: CPT | Mod: NTX | Performed by: EMERGENCY MEDICINE

## 2022-06-13 PROCEDURE — 84100 ASSAY OF PHOSPHORUS: CPT | Mod: NTX | Performed by: NURSE PRACTITIONER

## 2022-06-13 PROCEDURE — 25000003 PHARM REV CODE 250: Mod: NTX | Performed by: STUDENT IN AN ORGANIZED HEALTH CARE EDUCATION/TRAINING PROGRAM

## 2022-06-13 PROCEDURE — 99220 PR INITIAL OBSERVATION CARE,LEVL III: CPT | Mod: GC,NTX,, | Performed by: INTERNAL MEDICINE

## 2022-06-13 PROCEDURE — 96374 THER/PROPH/DIAG INJ IV PUSH: CPT | Mod: NTX

## 2022-06-13 PROCEDURE — G0378 HOSPITAL OBSERVATION PER HR: HCPCS | Mod: NTX

## 2022-06-13 PROCEDURE — G0257 UNSCHED DIALYSIS ESRD PT HOS: HCPCS | Mod: NTX

## 2022-06-13 PROCEDURE — 99285 PR EMERGENCY DEPT VISIT,LEVEL V: ICD-10-PCS | Mod: NTX,CS,, | Performed by: EMERGENCY MEDICINE

## 2022-06-13 PROCEDURE — 93005 ELECTROCARDIOGRAM TRACING: CPT | Mod: NTX

## 2022-06-13 PROCEDURE — 93010 EKG 12-LEAD: ICD-10-PCS | Mod: NTX,,, | Performed by: INTERNAL MEDICINE

## 2022-06-13 PROCEDURE — 63600175 PHARM REV CODE 636 W HCPCS: Mod: NTX | Performed by: EMERGENCY MEDICINE

## 2022-06-13 PROCEDURE — 83735 ASSAY OF MAGNESIUM: CPT | Mod: NTX | Performed by: NURSE PRACTITIONER

## 2022-06-13 PROCEDURE — 86803 HEPATITIS C AB TEST: CPT | Mod: NTX | Performed by: EMERGENCY MEDICINE

## 2022-06-13 PROCEDURE — U0002 COVID-19 LAB TEST NON-CDC: HCPCS | Mod: NTX | Performed by: STUDENT IN AN ORGANIZED HEALTH CARE EDUCATION/TRAINING PROGRAM

## 2022-06-13 PROCEDURE — 85025 COMPLETE CBC W/AUTO DIFF WBC: CPT | Mod: NTX | Performed by: NURSE PRACTITIONER

## 2022-06-13 PROCEDURE — 84484 ASSAY OF TROPONIN QUANT: CPT | Mod: 91,NTX | Performed by: NURSE PRACTITIONER

## 2022-06-13 PROCEDURE — 80100014 HC HEMODIALYSIS 1:1: Mod: NTX

## 2022-06-13 PROCEDURE — 87040 BLOOD CULTURE FOR BACTERIA: CPT | Mod: NTX | Performed by: EMERGENCY MEDICINE

## 2022-06-13 PROCEDURE — 80053 COMPREHEN METABOLIC PANEL: CPT | Mod: NTX | Performed by: NURSE PRACTITIONER

## 2022-06-13 PROCEDURE — 99285 EMERGENCY DEPT VISIT HI MDM: CPT | Mod: 25,NTX,CS

## 2022-06-13 RX ORDER — SODIUM CHLORIDE 0.9 % (FLUSH) 0.9 %
10 SYRINGE (ML) INJECTION EVERY 12 HOURS PRN
Status: DISCONTINUED | OUTPATIENT
Start: 2022-06-13 | End: 2022-06-14 | Stop reason: HOSPADM

## 2022-06-13 RX ORDER — SEVELAMER CARBONATE 800 MG/1
800 TABLET, FILM COATED ORAL
Status: DISCONTINUED | OUTPATIENT
Start: 2022-06-14 | End: 2022-06-14

## 2022-06-13 RX ORDER — CARVEDILOL 12.5 MG/1
25 TABLET ORAL 2 TIMES DAILY WITH MEALS
Status: DISCONTINUED | OUTPATIENT
Start: 2022-06-13 | End: 2022-06-13

## 2022-06-13 RX ORDER — BENZONATATE 100 MG/1
100 CAPSULE ORAL 3 TIMES DAILY PRN
Status: DISCONTINUED | OUTPATIENT
Start: 2022-06-13 | End: 2022-06-14 | Stop reason: HOSPADM

## 2022-06-13 RX ORDER — IBUPROFEN 200 MG
16 TABLET ORAL
Status: DISCONTINUED | OUTPATIENT
Start: 2022-06-13 | End: 2022-06-14 | Stop reason: HOSPADM

## 2022-06-13 RX ORDER — NALOXONE HCL 0.4 MG/ML
0.02 VIAL (ML) INJECTION
Status: DISCONTINUED | OUTPATIENT
Start: 2022-06-13 | End: 2022-06-14 | Stop reason: HOSPADM

## 2022-06-13 RX ORDER — HYDRALAZINE HYDROCHLORIDE 25 MG/1
25 TABLET, FILM COATED ORAL 2 TIMES DAILY
Status: DISCONTINUED | OUTPATIENT
Start: 2022-06-13 | End: 2022-06-13

## 2022-06-13 RX ORDER — ATORVASTATIN CALCIUM 20 MG/1
40 TABLET, FILM COATED ORAL DAILY
Status: DISCONTINUED | OUTPATIENT
Start: 2022-06-14 | End: 2022-06-14 | Stop reason: HOSPADM

## 2022-06-13 RX ORDER — IBUPROFEN 200 MG
24 TABLET ORAL
Status: DISCONTINUED | OUTPATIENT
Start: 2022-06-13 | End: 2022-06-14 | Stop reason: HOSPADM

## 2022-06-13 RX ORDER — ACETAMINOPHEN 325 MG/1
650 TABLET ORAL EVERY 6 HOURS PRN
Status: DISCONTINUED | OUTPATIENT
Start: 2022-06-13 | End: 2022-06-14 | Stop reason: HOSPADM

## 2022-06-13 RX ORDER — AMLODIPINE BESYLATE 10 MG/1
10 TABLET ORAL DAILY
Status: DISCONTINUED | OUTPATIENT
Start: 2022-06-14 | End: 2022-06-14 | Stop reason: HOSPADM

## 2022-06-13 RX ORDER — HYDRALAZINE HYDROCHLORIDE 20 MG/ML
10 INJECTION INTRAMUSCULAR; INTRAVENOUS
Status: COMPLETED | OUTPATIENT
Start: 2022-06-13 | End: 2022-06-13

## 2022-06-13 RX ORDER — AMLODIPINE BESYLATE 10 MG/1
10 TABLET ORAL
Status: COMPLETED | OUTPATIENT
Start: 2022-06-13 | End: 2022-06-13

## 2022-06-13 RX ORDER — HYDRALAZINE HYDROCHLORIDE 25 MG/1
25 TABLET, FILM COATED ORAL 2 TIMES DAILY
Status: DISCONTINUED | OUTPATIENT
Start: 2022-06-14 | End: 2022-06-13

## 2022-06-13 RX ORDER — GLUCAGON 1 MG
1 KIT INJECTION
Status: DISCONTINUED | OUTPATIENT
Start: 2022-06-13 | End: 2022-06-14 | Stop reason: HOSPADM

## 2022-06-13 RX ORDER — SODIUM CHLORIDE 9 MG/ML
INJECTION, SOLUTION INTRAVENOUS ONCE
Status: DISCONTINUED | OUTPATIENT
Start: 2022-06-13 | End: 2022-06-14 | Stop reason: HOSPADM

## 2022-06-13 RX ORDER — ASPIRIN 81 MG/1
81 TABLET ORAL DAILY
Status: DISCONTINUED | OUTPATIENT
Start: 2022-06-14 | End: 2022-06-14 | Stop reason: HOSPADM

## 2022-06-13 RX ORDER — CARVEDILOL 25 MG/1
25 TABLET ORAL 2 TIMES DAILY WITH MEALS
Status: DISCONTINUED | OUTPATIENT
Start: 2022-06-13 | End: 2022-06-14 | Stop reason: HOSPADM

## 2022-06-13 RX ADMIN — AMLODIPINE BESYLATE 10 MG: 10 TABLET ORAL at 06:06

## 2022-06-13 RX ADMIN — ACETAMINOPHEN 650 MG: 325 TABLET ORAL at 10:06

## 2022-06-13 RX ADMIN — HYDRALAZINE HYDROCHLORIDE 10 MG: 20 INJECTION, SOLUTION INTRAMUSCULAR; INTRAVENOUS at 07:06

## 2022-06-13 RX ADMIN — CARVEDILOL 25 MG: 25 TABLET, FILM COATED ORAL at 07:06

## 2022-06-13 RX ADMIN — BENZONATATE 100 MG: 100 CAPSULE ORAL at 10:06

## 2022-06-13 NOTE — ED PROVIDER NOTES
Encounter Date: 6/13/2022       History     Chief Complaint   Patient presents with    Chest Pain     Has stents dialysis friday     Haroldo Dickinson is a 46 M hx of CAD, diabetes, ESRD on HD, hypertension, s/p nephrectomy on 05/19 presenting today with chest pain.  Patient states he has had left-sided chest pain that radiates to his left arm daily for the past 3 months.  It is 10/10, not associated with shortness of breath, diaphoresis, nausea or vomiting.  He reports previous cardiac stents.  For the past couple days he has had worsening pain, unable to sleep at night.  He did not go to dialysis today because he did fall asleep until 6:00 a.m..  Dialysis scheduled for 11:00 a.m..  Denies infectious symptoms.          Review of patient's allergies indicates:   Allergen Reactions    No known allergies      Past Medical History:   Diagnosis Date    CAD (coronary artery disease), native coronary artery 11/21/2019    Cataract     Diabetes mellitus     Diabetic retinopathy     Dialysis patient     DM type 2 causing renal disease, not at goal     ESRD (end stage renal disease) started dialysis 01/2014 6/5/2014    Hyperparathyroidism, secondary renal 6/5/2014    Hypertension     NSTEMI (non-ST elevated myocardial infarction) 12/21/2013    Organ transplant candidate 6/5/2014    Pneumonia     Renal hypertension     Stroke      Past Surgical History:   Procedure Laterality Date    ANGIOGRAM, CORONARY, WITH LEFT HEART CATHETERIZATION N/A 7/1/2021    Procedure: Angiogram, Coronary, with Left Heart Cath;  Surgeon: Miki Zendejas MD;  Location: [facility] CATH LAB;  Service: Cardiology;  Laterality: N/A;    COLONOSCOPY N/A 5/3/2017    Procedure: COLONOSCOPY;  Surgeon: Rufino Carpenter MD;  Location: [facility] ([address] [place] [address]);  Service: Endoscopy;  Laterality: N/A;  pt states can only schedule on Wednesdays    COLONOSCOPY N/A 2/9/2021    Procedure: COLONOSCOPY;  Surgeon: Edil Wong MD;  Location: [facility] ([address] [place] [address]);   Service: Endoscopy;  Laterality: N/A;  Dialysis MWF/ labwork day of procedure  right arm aceess  per Dr. Colin pt can hold Plavix 5 days prior see note- sm  COVID test on 2/6/21 at [facility] -     COLONOSCOPY N/A 2/10/2021    Procedure: COLONOSCOPY;  Surgeon: Robert Ayers MD;  Location: [facility] (4TH FLR);  Service: Endoscopy;  Laterality: N/A;  rescheduled due to poor bowel prep-BB  negative covid screening 2/6/21-BB  dialysis M-W-F-BB  okay to r/s for 2/9/21 and to hold Plavix per Dr. KIRAN Wong-BB  labs same day-BB    CORONARY STENT PLACEMENT N/A 7/25/2019    Procedure: INSERTION, STENT, CORONARY ARTERY;  Surgeon: Miki Zendejas MD;  Location: [facility] CATH LAB;  Service: Cardiology;  Laterality: N/A;    DIALYSIS FISTULA CREATION      FISTULOGRAM N/A 2/18/2019    Procedure: Fistulogram;  Surgeon: Yaneth De La Cruz MD;  Location: [facility] CATH LAB;  Service: Cardiology;  Laterality: N/A;    FISTULOGRAM Right 7/23/2019    Procedure: Fistulogram;  Surgeon: Oz Cordoba MD;  Location: [facility] CATH LAB;  Service: Cardiology;  Laterality: Right;    LAPAROSCOPIC ROBOT-ASSISTED SURGICAL REMOVAL OF KIDNEY USING DA JAIME XI Right 5/19/2022    Procedure: XI ROBOTIC NEPHRECTOMY;  Surgeon: Aidan Hendricks MD;  [address]  Service: Urology;  Laterality: Right;  3hrs    LEFT HEART CATHETERIZATION Left 7/25/2019    Procedure: Left heart cath;  Surgeon: Miki Zendejas MD;  Location: [facility] CATH LAB;  Service: Cardiology;  Laterality: Left;    RETINAL LASER PROCEDURE Bilateral 2018 or 2017    Dr. Rothman    VASCULAR SURGERY       Family History   Problem Relation Age of Onset    Hypertension Mother     Diabetes Mother     Cataracts Mother     Hypertension Father     Hypertension Sister     Kidney disease Brother     Hypertension Brother     Heart disease Brother     Cancer Maternal Grandfather         Colon CA    Colon cancer Neg Hx     Esophageal cancer Neg Hx     Stomach cancer Neg Hx      Rectal cancer Neg Hx     Ulcerative colitis Neg Hx     Irritable bowel syndrome Neg Hx     Crohn's disease Neg Hx     Celiac disease Neg Hx     Glaucoma Neg Hx     Macular degeneration Neg Hx      Social History     Tobacco Use    Smoking status: Never Smoker    Smokeless tobacco: Never Used   Substance Use Topics    Alcohol use: Yes     Comment: One drink a month    Drug use: No     Review of Systems   Constitutional: Negative for chills and fever.   HENT: Negative for congestion and sore throat.    Respiratory: Negative for cough, shortness of breath and wheezing.    Cardiovascular: Positive for chest pain. Negative for palpitations and leg swelling.   Gastrointestinal: Negative for abdominal distention, abdominal pain, diarrhea, nausea and vomiting.   Genitourinary: Negative for dysuria and urgency.   Musculoskeletal: Negative for myalgias.   Skin: Negative for wound.   Neurological: Negative for dizziness, weakness and light-headedness.   Psychiatric/Behavioral: Negative for confusion.       Physical Exam     Initial Vitals [06/13/22 1610]   BP Pulse Resp Temp SpO2   (!) 174/100 97 18 100 °F (37.8 °C) 98 %      MAP       --         Physical Exam    Nursing note and vitals reviewed.  Constitutional: He appears well-developed and well-nourished. No distress.   HENT:   Mouth/Throat: Oropharynx is clear and moist.   Eyes: Conjunctivae are normal.   Neck: Neck supple.   Cardiovascular: Normal rate, regular rhythm and intact distal pulses.   (+) R AV fistula w/ thrill     Pulmonary/Chest: Breath sounds normal. He has no wheezes. He has no rales.   Abdominal: Abdomen is soft. Bowel sounds are normal. There is no abdominal tenderness.   + surgical incisions well healed. There is no rebound and no guarding.   Musculoskeletal:         General: No edema.      Cervical back: Neck supple.     Lymphadenopathy:     He has no cervical adenopathy.   Neurological: He is alert and oriented to person, place, and time.  He has normal strength.   Skin: No rash noted.   Psychiatric: He has a normal mood and affect.         ED Course   Procedures  Labs Reviewed   CBC W/ AUTO DIFFERENTIAL - Abnormal; Notable for the following components:       Result Value    WBC 13.19 (*)     RBC 2.66 (*)     Hemoglobin 8.4 (*)     Hematocrit 26.4 (*)     MCV 99 (*)     MCH 31.6 (*)     MCHC 31.8 (*)     RDW 15.5 (*)     Gran # (ANC) 9.9 (*)     Immature Grans (Abs) 0.05 (*)     Mono # 1.5 (*)     Gran % 74.9 (*)     Lymph % 9.6 (*)     All other components within normal limits   CULTURE, BLOOD   CULTURE, BLOOD   HIV 1 / 2 ANTIBODY   HEPATITIS C ANTIBODY   COMPREHENSIVE METABOLIC PANEL   TROPONIN I   MAGNESIUM   PHOSPHORUS          Imaging Results    None          Medications - No data to display  Medical Decision Making:   History:   Old Medical Records: I decided to obtain old medical records.  Initial Assessment:   Emergent evaluation a 46-year-old male presenting today with 3 month history of left-sided chest pain.    He is hypertensive, did not take his Norvasc today.    Saturation 98% on room air, low grade temp 100.  Differential Diagnosis:   Uncontrolled hypertension, ACS, pneumonia, hypervolemia  Independently Interpreted Test(s):   I have ordered and independently interpreted X-rays - see prior notes.  I have ordered and independently interpreted EKG Reading(s) - see prior notes  Clinical Tests:   Lab Tests: Ordered and Reviewed  Radiological Study: Ordered and Reviewed  Medical Tests: Ordered and Reviewed  ED Management:  - labs  - EKG  - blood cultures  - CXR                 ED Course as of 06/14/22 1020   Mon Jun 13, 2022   1807 Phosphorus(!): 2.3 [GM]   1807 Troponin I(!): 0.141  Previous 0.04 [GM]   1808 WBC(!): 13.19 [GM]   1808 Hemoglobin(!): 8.4 [GM]   1841 Given slightly elevated troponin which could be secondary to missed dialysis, subtle EKG changes  will place in observation under hospital medicine for further  evaluation.    Blood cultures also obtained, abdomen soft, low suspicion for intra-abdominal cause of his leukocytosis. [GM]      ED Course User Index  [GM] Romi Orellana MD             Clinical Impression:   Final diagnoses:  [R07.9] Chest pain                 Romi Orellana MD  06/14/22 1020

## 2022-06-13 NOTE — FIRST PROVIDER EVALUATION
" Emergency Department TeleTriage Encounter Note      CHIEF COMPLAINT    Chief Complaint   Patient presents with    Chest Pain     Has stents dialysis friday       VITAL SIGNS   Initial Vitals [06/13/22 1610]   BP Pulse Resp Temp SpO2   (!) 174/100 97 18 100 °F (37.8 °C) 98 %      MAP       --            ALLERGIES    Review of patient's allergies indicates:   Allergen Reactions    No known allergies        PROVIDER TRIAGE NOTE  This is a teletriage evaluation of a 46 y.o. male presenting to the ED complaining of CP "at night when I sleep" for three months. No current CP. States that he came today because he has not been able to sleep in a few days.  No SOB.  Pt last dialyzed on Friday.     Well-appearing, no distress.      Initial orders will be placed and care will be transferred to an alternate provider when patient is roomed for a full evaluation. Any additional orders and the final disposition will be determined by that provider.           ORDERS  Labs Reviewed   HIV 1 / 2 ANTIBODY   HEPATITIS C ANTIBODY   CBC W/ AUTO DIFFERENTIAL   COMPREHENSIVE METABOLIC PANEL   TROPONIN I   MAGNESIUM   PHOSPHORUS       ED Orders (720h ago, onward)    Start Ordered     Status Ordering Provider    06/13/22 1645 06/13/22 1644  Magnesium  STAT         Ordered REJI SCHULTE N.    06/13/22 1645 06/13/22 1644  Phosphorus  STAT         Ordered REJI SCHULTE N.    06/13/22 1644 06/13/22 1643  Vital signs  Every 15 min         Ordered REJI SCHULTE N.    06/13/22 1644 06/13/22 1643  Cardiac Monitoring - Adult  Continuous        Comments: Notify Physician If:    Ordered REJI SCHULTE N.    06/13/22 1644 06/13/22 1643  Pulse Oximetry Continuous  Continuous         Ordered REJI SCHULTE N.    06/13/22 1644 06/13/22 1643  Diet NPO  Diet effective now         Ordered REJI SCHULTE N.    06/13/22 1644 06/13/22 1643  Saline lock IV  Once         Ordered REJI SCHULTE N.    " 06/13/22 1644 06/13/22 1643  EKG 12-lead  Once        Comments: Do not perform if previously done during this visit/ triage    Ordered REJI SCHULTE N.    06/13/22 1644 06/13/22 1643  CBC auto differential  STAT         Ordered REJI SCHULTE N.    06/13/22 1644 06/13/22 1643  Comprehensive metabolic panel  STAT         Ordered REJI SCHULTE N.    06/13/22 1644 06/13/22 1643  Troponin I #1  STAT         Ordered REJI SCHULTE N.    06/13/22 1644 06/13/22 1643  X-Ray Chest PA And Lateral  1 time imaging         Ordered FRANCA REJI N.    06/13/22 1612 06/13/22 1612  HIV 1/2 Ag/Ab (4th Gen)  STAT         Ordered AZEB MEHTA    06/13/22 1612 06/13/22 1612  Hepatitis C Antibody  STAT         Ordered AZEB MEHTA            Virtual Visit Note: The provider triage portion of this emergency department evaluation and documentation was performed via [employer], a HIPAA-compliant telemedicine application, in concert with a tele-presenter in the room. A face to face patient evaluation with one of my colleagues will occur once the patient is placed in an emergency department room.      DISCLAIMER: This note was prepared with Postdeck voice recognition transcription software. Garbled syntax, mangled pronouns, and other bizarre constructions may be attributed to that software system.

## 2022-06-13 NOTE — ED TRIAGE NOTES
Haroldo Alok Dewey, a 46 y.o. male presents to the ED w/ complaint of chest pain. Pt states chest pain x a couple months. Left sided, radiates down left arm. Reports chest pain only occurs at night, when he lies down; sitting up relieves pain. States it feels like his HR is elevated during episodes. Not associated with SOB. Dialysis pt m/w/fr; did not go to dialysis today.     Triage note:  Chief Complaint   Patient presents with    Chest Pain     Has stents dialysis friday     Review of patient's allergies indicates:   Allergen Reactions    No known allergies      Past Medical History:   Diagnosis Date    CAD (coronary artery disease), native coronary artery 11/21/2019    Cataract     Diabetes mellitus     Diabetic retinopathy     Dialysis patient     DM type 2 causing renal disease, not at goal     ESRD (end stage renal disease) started dialysis 01/2014 6/5/2014    Hyperparathyroidism, secondary renal 6/5/2014    Hypertension     NSTEMI (non-ST elevated myocardial infarction) 12/21/2013    Organ transplant candidate 6/5/2014    Pneumonia     Renal hypertension     Stroke

## 2022-06-14 VITALS
SYSTOLIC BLOOD PRESSURE: 166 MMHG | HEART RATE: 85 BPM | OXYGEN SATURATION: 99 % | WEIGHT: 160.06 LBS | HEIGHT: 68 IN | TEMPERATURE: 98 F | BODY MASS INDEX: 24.26 KG/M2 | RESPIRATION RATE: 18 BRPM | DIASTOLIC BLOOD PRESSURE: 85 MMHG

## 2022-06-14 PROBLEM — N18.6 ANEMIA IN ESRD (END-STAGE RENAL DISEASE): Status: ACTIVE | Noted: 2022-06-14

## 2022-06-14 PROBLEM — M89.9 CHRONIC KIDNEY DISEASE-MINERAL AND BONE DISORDER: Status: ACTIVE | Noted: 2022-06-14

## 2022-06-14 PROBLEM — N18.9 CHRONIC KIDNEY DISEASE-MINERAL AND BONE DISORDER: Status: ACTIVE | Noted: 2022-06-14

## 2022-06-14 PROBLEM — D63.1 ANEMIA IN ESRD (END-STAGE RENAL DISEASE): Status: ACTIVE | Noted: 2022-06-14

## 2022-06-14 PROBLEM — E83.9 CHRONIC KIDNEY DISEASE-MINERAL AND BONE DISORDER: Status: ACTIVE | Noted: 2022-06-14

## 2022-06-14 LAB
ALBUMIN SERPL BCP-MCNC: 2.8 G/DL (ref 3.5–5.2)
ALP SERPL-CCNC: 75 U/L (ref 55–135)
ALT SERPL W/O P-5'-P-CCNC: 6 U/L (ref 10–44)
ANION GAP SERPL CALC-SCNC: 12 MMOL/L (ref 8–16)
AST SERPL-CCNC: 11 U/L (ref 10–40)
BASOPHILS # BLD AUTO: 0.1 K/UL (ref 0–0.2)
BASOPHILS NFR BLD: 0.6 % (ref 0–1.9)
BILIRUB SERPL-MCNC: 1.5 MG/DL (ref 0.1–1)
BUN SERPL-MCNC: 29 MG/DL (ref 6–20)
CALCIUM SERPL-MCNC: 9.1 MG/DL (ref 8.7–10.5)
CHLORIDE SERPL-SCNC: 101 MMOL/L (ref 95–110)
CO2 SERPL-SCNC: 20 MMOL/L (ref 23–29)
CREAT SERPL-MCNC: 8.5 MG/DL (ref 0.5–1.4)
DIFFERENTIAL METHOD: ABNORMAL
EOSINOPHIL # BLD AUTO: 0.1 K/UL (ref 0–0.5)
EOSINOPHIL NFR BLD: 0.9 % (ref 0–8)
ERYTHROCYTE [DISTWIDTH] IN BLOOD BY AUTOMATED COUNT: 15.6 % (ref 11.5–14.5)
EST. GFR  (AFRICAN AMERICAN): 7.9 ML/MIN/1.73 M^2
EST. GFR  (NON AFRICAN AMERICAN): 6.9 ML/MIN/1.73 M^2
GLUCOSE SERPL-MCNC: 94 MG/DL (ref 70–110)
HCT VFR BLD AUTO: 27.6 % (ref 40–54)
HCV AB SERPL QL IA: NEGATIVE
HGB BLD-MCNC: 8.9 G/DL (ref 14–18)
HIV 1+2 AB+HIV1 P24 AG SERPL QL IA: NEGATIVE
IMM GRANULOCYTES # BLD AUTO: 0.07 K/UL (ref 0–0.04)
IMM GRANULOCYTES NFR BLD AUTO: 0.4 % (ref 0–0.5)
LYMPHOCYTES # BLD AUTO: 1.2 K/UL (ref 1–4.8)
LYMPHOCYTES NFR BLD: 7.3 % (ref 18–48)
MAGNESIUM SERPL-MCNC: 2.1 MG/DL (ref 1.6–2.6)
MCH RBC QN AUTO: 31.6 PG (ref 27–31)
MCHC RBC AUTO-ENTMCNC: 32.2 G/DL (ref 32–36)
MCV RBC AUTO: 98 FL (ref 82–98)
MONOCYTES # BLD AUTO: 1.5 K/UL (ref 0.3–1)
MONOCYTES NFR BLD: 9.2 % (ref 4–15)
NEUTROPHILS # BLD AUTO: 12.9 K/UL (ref 1.8–7.7)
NEUTROPHILS NFR BLD: 81.6 % (ref 38–73)
NRBC BLD-RTO: 0 /100 WBC
PHOSPHATE SERPL-MCNC: 2.1 MG/DL (ref 2.7–4.5)
PLATELET # BLD AUTO: 442 K/UL (ref 150–450)
PMV BLD AUTO: 10.1 FL (ref 9.2–12.9)
POTASSIUM SERPL-SCNC: 4.2 MMOL/L (ref 3.5–5.1)
PROT SERPL-MCNC: 7.9 G/DL (ref 6–8.4)
RBC # BLD AUTO: 2.82 M/UL (ref 4.6–6.2)
SODIUM SERPL-SCNC: 133 MMOL/L (ref 136–145)
TROPONIN I SERPL DL<=0.01 NG/ML-MCNC: 0.18 NG/ML (ref 0–0.03)
TROPONIN I SERPL DL<=0.01 NG/ML-MCNC: 0.33 NG/ML (ref 0–0.03)
WBC # BLD AUTO: 15.82 K/UL (ref 3.9–12.7)

## 2022-06-14 PROCEDURE — 96376 TX/PRO/DX INJ SAME DRUG ADON: CPT | Mod: NTX

## 2022-06-14 PROCEDURE — 36415 COLL VENOUS BLD VENIPUNCTURE: CPT | Mod: NTX | Performed by: STUDENT IN AN ORGANIZED HEALTH CARE EDUCATION/TRAINING PROGRAM

## 2022-06-14 PROCEDURE — 83735 ASSAY OF MAGNESIUM: CPT | Mod: NTX | Performed by: STUDENT IN AN ORGANIZED HEALTH CARE EDUCATION/TRAINING PROGRAM

## 2022-06-14 PROCEDURE — 99217 PR OBSERVATION CARE DISCHARGE: CPT | Mod: NTX,,, | Performed by: INTERNAL MEDICINE

## 2022-06-14 PROCEDURE — 25000003 PHARM REV CODE 250: Mod: NTX | Performed by: STUDENT IN AN ORGANIZED HEALTH CARE EDUCATION/TRAINING PROGRAM

## 2022-06-14 PROCEDURE — 99214 PR OFFICE/OUTPT VISIT, EST, LEVL IV, 30-39 MIN: ICD-10-PCS | Mod: NTX,,, | Performed by: INTERNAL MEDICINE

## 2022-06-14 PROCEDURE — 99217 PR OBSERVATION CARE DISCHARGE: ICD-10-PCS | Mod: NTX,,, | Performed by: INTERNAL MEDICINE

## 2022-06-14 PROCEDURE — G0378 HOSPITAL OBSERVATION PER HR: HCPCS | Mod: NTX

## 2022-06-14 PROCEDURE — 80053 COMPREHEN METABOLIC PANEL: CPT | Mod: NTX | Performed by: STUDENT IN AN ORGANIZED HEALTH CARE EDUCATION/TRAINING PROGRAM

## 2022-06-14 PROCEDURE — 84484 ASSAY OF TROPONIN QUANT: CPT | Mod: NTX | Performed by: STUDENT IN AN ORGANIZED HEALTH CARE EDUCATION/TRAINING PROGRAM

## 2022-06-14 PROCEDURE — 99214 OFFICE O/P EST MOD 30 MIN: CPT | Mod: NTX,,, | Performed by: INTERNAL MEDICINE

## 2022-06-14 PROCEDURE — 84100 ASSAY OF PHOSPHORUS: CPT | Mod: NTX | Performed by: STUDENT IN AN ORGANIZED HEALTH CARE EDUCATION/TRAINING PROGRAM

## 2022-06-14 PROCEDURE — 63600175 PHARM REV CODE 636 W HCPCS: Mod: NTX

## 2022-06-14 PROCEDURE — 85025 COMPLETE CBC W/AUTO DIFF WBC: CPT | Mod: NTX | Performed by: STUDENT IN AN ORGANIZED HEALTH CARE EDUCATION/TRAINING PROGRAM

## 2022-06-14 RX ORDER — SEVELAMER CARBONATE 800 MG/1
800 TABLET, FILM COATED ORAL
Qty: 90 TABLET | Refills: 3 | Status: SHIPPED | OUTPATIENT
Start: 2022-06-14 | End: 2022-06-14 | Stop reason: HOSPADM

## 2022-06-14 RX ORDER — ATORVASTATIN CALCIUM 40 MG/1
40 TABLET, FILM COATED ORAL DAILY
Qty: 90 TABLET | Refills: 3 | Status: SHIPPED | OUTPATIENT
Start: 2022-06-14 | End: 2022-10-20

## 2022-06-14 RX ORDER — LOSARTAN POTASSIUM 50 MG/1
100 TABLET ORAL DAILY
Status: DISCONTINUED | OUTPATIENT
Start: 2022-06-14 | End: 2022-06-14 | Stop reason: HOSPADM

## 2022-06-14 RX ORDER — HYDRALAZINE HYDROCHLORIDE 20 MG/ML
10 INJECTION INTRAMUSCULAR; INTRAVENOUS ONCE
Status: COMPLETED | OUTPATIENT
Start: 2022-06-14 | End: 2022-06-14

## 2022-06-14 RX ORDER — HYDRALAZINE HYDROCHLORIDE 25 MG/1
25 TABLET, FILM COATED ORAL 2 TIMES DAILY PRN
Status: DISCONTINUED | OUTPATIENT
Start: 2022-06-14 | End: 2022-06-14 | Stop reason: HOSPADM

## 2022-06-14 RX ADMIN — AMLODIPINE BESYLATE 10 MG: 10 TABLET ORAL at 08:06

## 2022-06-14 RX ADMIN — CARVEDILOL 25 MG: 25 TABLET, FILM COATED ORAL at 07:06

## 2022-06-14 RX ADMIN — SEVELAMER CARBONATE 800 MG: 800 TABLET, FILM COATED ORAL at 12:06

## 2022-06-14 RX ADMIN — SEVELAMER CARBONATE 800 MG: 800 TABLET, FILM COATED ORAL at 08:06

## 2022-06-14 RX ADMIN — ASPIRIN 81 MG: 81 TABLET, COATED ORAL at 08:06

## 2022-06-14 RX ADMIN — HYDRALAZINE HYDROCHLORIDE 10 MG: 20 INJECTION, SOLUTION INTRAMUSCULAR; INTRAVENOUS at 04:06

## 2022-06-14 RX ADMIN — LOSARTAN POTASSIUM 100 MG: 50 TABLET, FILM COATED ORAL at 08:06

## 2022-06-14 NOTE — SUBJECTIVE & OBJECTIVE
Interval History:   Review of Systems   Constitutional:  Negative for fatigue and fever.   HENT:  Negative for congestion and rhinorrhea.    Eyes:  Negative for photophobia and visual disturbance.   Respiratory:  Negative for cough, shortness of breath and wheezing.    Cardiovascular:  Negative for chest pain and palpitations.   Gastrointestinal:  Negative for abdominal pain, diarrhea, nausea and vomiting.   Genitourinary:  Negative for dysuria.   Musculoskeletal:  Negative for arthralgias.   Skin:  Negative for rash and wound.   Neurological:  Negative for dizziness, syncope, light-headedness and headaches.   Psychiatric/Behavioral:  Negative for agitation and confusion.    Objective:     Vital Signs (Most Recent):  Temp: 98 °F (36.7 °C) (06/14/22 1131)  Pulse: 82 (06/14/22 1131)  Resp: 18 (06/14/22 1131)  BP: (!) 176/83 (06/14/22 1131)  SpO2: (!) 93 % (06/14/22 1131)   Vital Signs (24h Range):  Temp:  [98 °F (36.7 °C)-100 °F (37.8 °C)] 98 °F (36.7 °C)  Pulse:  [77-97] 82  Resp:  [6-56] 18  SpO2:  [91 %-100 %] 93 %  BP: (141-225)/() 176/83     Weight: 72.6 kg (160 lb 0.9 oz)  Body mass index is 24.34 kg/m².    Intake/Output Summary (Last 24 hours) at 6/14/2022 1321  Last data filed at 6/14/2022 0832  Gross per 24 hour   Intake 1240 ml   Output 4000 ml   Net -2760 ml      Physical Exam  Vitals and nursing note reviewed.   Constitutional:       General: He is not in acute distress.     Appearance: Normal appearance. He is not ill-appearing.   HENT:      Head: Normocephalic and atraumatic.      Mouth/Throat:      Mouth: Mucous membranes are moist.   Eyes:      Extraocular Movements: Extraocular movements intact.      Pupils: Pupils are equal, round, and reactive to light.   Cardiovascular:      Rate and Rhythm: Normal rate and regular rhythm.      Pulses: Normal pulses.      Heart sounds: Normal heart sounds. No murmur heard.  Pulmonary:      Effort: Pulmonary effort is normal.      Breath sounds: Normal breath  sounds. No wheezing, rhonchi or rales.   Abdominal:      General: Bowel sounds are normal. There is no distension.      Palpations: Abdomen is soft.      Tenderness: There is no abdominal tenderness.   Musculoskeletal:         General: No tenderness.      Right lower leg: No edema.      Left lower leg: No edema.   Skin:     General: Skin is warm.      Capillary Refill: Capillary refill takes less than 2 seconds.      Findings: No erythema or rash.   Neurological:      General: No focal deficit present.      Mental Status: He is alert and oriented to person, place, and time.   Psychiatric:         Mood and Affect: Mood normal.         Thought Content: Thought content normal.       Significant Labs: All pertinent labs within the past 24 hours have been reviewed.  CBC:   Recent Labs   Lab 06/13/22  1702 06/14/22  0449   WBC 13.19* 15.82*   HGB 8.4* 8.9*   HCT 26.4* 27.6*    442     CMP:   Recent Labs   Lab 06/13/22  1702 06/14/22  0449    133*   K 4.1 4.2   CL 98 101   CO2 27 20*   GLU 85 94   BUN 46* 29*   CREATININE 12.8* 8.5*   CALCIUM 8.9 9.1   PROT 7.6 7.9   ALBUMIN 2.9* 2.8*   BILITOT 1.1* 1.5*   ALKPHOS 72 75   AST 14 11   ALT 9* 6*   ANIONGAP 15 12   EGFRNONAA 4.2* 6.9*     Significant Imaging: I have reviewed all pertinent imaging results/findings within the past 24 hours.

## 2022-06-14 NOTE — ASSESSMENT & PLAN NOTE
- troponin minimally elevated in setting of ESRD and hypertensive urgency.  Likely secondary to demand ischemia.  No active chest pain or recent chest pain.  EKG with no ischemic changes  - telemetry

## 2022-06-14 NOTE — PLAN OF CARE
[facility] - Telemetry Stepdown  Discharge Final Note    Primary Care Provider: Tess Ledbetter MD    Expected Discharge Date: 6/14/2022       Future Appointments   Date Time Provider Department Center   7/7/2022  9:00 AM [facility]1 [facility]   7/12/2022 10:30 AM Aidan Hendricks MD [facility] UROLOGC [address]   8/16/2022  9:00 AM [facility]XRAY [facility]   8/16/2022  9:30 AM [facility]IC-US1 [facility] ULTR [facility]   8/16/2022 11:00 AM ECHO, MAIN CAMPUS [facility]CHOSTR [address]   8/16/2022 12:00 PM LAB, APPOINTMENT [facility] LAB [facility]   8/16/2022 12:30 PM KIDNEY LISTED, TRANSPLANT [facility]NT[facility]         Final Discharge Note (most recent)     Final Note - 06/14/22 1529        Final Note    Assessment Type Final Discharge Note     Anticipated Discharge Disposition Home or Self Care     What phone number can be called within the next 1-3 days to see how you are doing after discharge? [phone]     Hospital Resources/Appts/Education Provided Appointments scheduled and added to AVS        Post-Acute Status    Post-Acute Authorization Other     Other Status No Post-Acute Service Needs     Discharge Delays None known at this time                 Important Message from Medicare             Contact Info     Tess Ledbetter MD   Specialty: General Practice   Relationship: PCP - General    [address] [facility]  [facility] [address]   Phone: [phone]       Next Steps: Follow up on 6/23/2022    Instructions: Established pt's hospital f/u visit on 6/23/22 @ 10:00am. Please bring discharge summary, ID, insurance card, and medication list.            Denisse Newby RN  Ext [phone]

## 2022-06-14 NOTE — HOSPITAL COURSE
Admitted to hospital medicine for hypertensive urgency 2/2 missed dialysis session. Received dialysis and had 3L removed. Hypertension improved post-dialysis and reinitiation of losartan. Pt feeling much improved on morning of 6/14. Noted to have leukocytosis but no infective symptoms present. Will discharge home and plan to have follow-up in post-discharge clinic later in the week for review and repeat CBC. Pt to return to his home dialysis unit for HD on 6/15.

## 2022-06-14 NOTE — PLAN OF CARE
[facility] - Telemetry Stepdown  Initial Discharge Assessment       Primary Care Provider: Tess Ledbetter MD    Admission Diagnosis: Chest pain [R07.9]  Leukocytosis, unspecified type [D72.829]    Admission Date: 6/13/2022  Expected Discharge Date: 6/14/2022    Discharge Barriers Identified: None    Payor: HU[employer] MANAGED MEDICARE / Plan: HU[employer]NA SNP (SPECIAL NEEDS PLAN) / Product Type: Medicare Advantage /     Extended Emergency Contact Information  Primary Emergency Contact: Gabby Dickinson  Address: [address] [place] [address] [place]  Mobile Phone: [phone]  Relation: Mother    Discharge Plan A: Home  Discharge Plan B: Home      [id number] [place], [place] - [address]  [address] [facility] [address]  Phone: [phone] Fax: [phone]    [id number] [place], [place] - [address] [facility]  [address] [facility] [address]  Phone: [phone] Fax: [phone]      Initial Assessment (most recent)     Adult Discharge Assessment - 06/14/22 0918        Discharge Assessment    Assessment Type Discharge Planning Assessment     Confirmed/corrected address, phone number and insurance Yes     Confirmed Demographics Correct on Facesheet     Source of Information patient     Communicated QUIN with patient/caregiver Yes     Reason For Admission Leukocytosis     Lives With alone     Do you expect to return to your current living situation? Yes     Do you have help at home or someone to help you manage your care at home? Yes     Who are your caregiver(s) and their phone number(s)? Gabby Dickinson ( mother) [phone]     Prior to hospitilization cognitive status: Alert/Oriented     Current cognitive status: Alert/Oriented     Walking or Climbing Stairs Difficulty ambulation difficulty, requires equipment     Dressing/Bathing Difficulty none     Equipment Currently Used at Home cane, straight     Patient currently being  followed by outpatient case management? No     Do you currently have service(s) that help you manage your care at home? No     Do you take prescription medications? Yes     Do you have prescription coverage? Yes     Do you have any problems affording any of your prescribed medications? No     Is the patient taking medications as prescribed? yes     Who is going to help you get home at discharge? Patient's mother will provide transportation home.     How do you get to doctors appointments? family or friend will provide     Are you on dialysis? Yes     Dialysis Name and Scheduled days [facility]- M/W/F     Do you take coumadin? No     Discharge Plan A Home     Discharge Plan B Home     DME Needed Upon Discharge  none     Discharge Plan discussed with: Patient     Discharge Barriers Identified None                 Denisse Newby RN  Ext [phone]

## 2022-06-14 NOTE — DISCHARGE SUMMARY
"[facility] - Telemetry [facility] Medicine  Discharge Summary      Patient Name: Haroldo Dickinson Sr.  MRN: [id number]  Patient Class: OP- Observation  Admission Date: 6/13/2022  Hospital Length of Stay: 0 days  Discharge Date and Time:  06/14/2022 1:24 PM  Attending Physician: Fareed Cuevas MD   Discharging Provider: Michelle Fonseca MD  Primary Care Provider: Tess Ledbetter MD  Hospital Medicine Team: [facility] MED 5 Michelle Fonseca MD    HPI:   46 year old male with CAD, T2DM, HTN, ESRD on HD, s/p nephrectomy for suspected RCC on 05/19/22 who presented due to chest palpitations and pain and left arm.  Patient reports his "heart feels like it is racing" throughout the night over the past 3 months.  The symptoms wake him from sleep.  Last night he developed similar symptoms but also had achy pains from his left shoulder down to his left forearm.  He denies any chest pain during these episodes or chest pain currently.  These episodes are not associated with shortness of breath or diaphoresis.  Due to worsening symptoms he decided to present to the emergency room.    On arrival,was hypertensive up to systolic 225, T-max 100, heart rates in the 90s, on room air.  Labs notable for WBC of 13, chronic anemia with hemoglobin 8.4 (at baseline).  Troponin elevated at 0.141, EKG with no acute ischemic changes.  Chest x-ray with mild pulmonary edema.  He was given his home amlodipine 10 mg and does hydralazine 10 mg IV x1 then admitted to hospital medicine for further care.    Last HD session was Friday.      * No surgery found *      Hospital Course:   Admitted to hospital medicine for hypertensive urgency 2/2 missed dialysis session. Received dialysis and had 3L removed. Hypertension improved post-dialysis and reinitiation of losartan. Pt feeling much improved on morning of 6/14. Noted to have leukocytosis but no infective symptoms present. Will discharge home and plan to have follow-up in post-discharge clinic " later in the week for review and repeat CBC. Pt to return to his home dialysis unit for HD on 6/15.     Review of Systems   Constitutional:  Negative for fatigue and fever.   HENT:  Negative for congestion and rhinorrhea.    Eyes:  Negative for photophobia and visual disturbance.   Respiratory:  Negative for cough, shortness of breath and wheezing.    Cardiovascular:  Negative for chest pain and palpitations.   Gastrointestinal:  Negative for abdominal pain, diarrhea, nausea and vomiting.   Genitourinary:  Negative for dysuria.   Musculoskeletal:  Negative for arthralgias.   Skin:  Negative for rash and wound.   Neurological:  Negative for dizziness, syncope, light-headedness and headaches.   Psychiatric/Behavioral:  Negative for agitation and confusion.    Objective:     Vital Signs (Most Recent):  Temp: 98 °F (36.7 °C) (06/14/22 1131)  Pulse: 82 (06/14/22 1131)  Resp: 18 (06/14/22 1131)  BP: (!) 176/83 (06/14/22 1131)  SpO2: (!) 93 % (06/14/22 1131)   Vital Signs (24h Range):  Temp:  [98 °F (36.7 °C)-100 °F (37.8 °C)] 98 °F (36.7 °C)  Pulse:  [77-97] 82  Resp:  [6-56] 18  SpO2:  [91 %-100 %] 93 %  BP: (141-225)/() 176/83     Weight: 72.6 kg (160 lb 0.9 oz)  Body mass index is 24.34 kg/m².    Intake/Output Summary (Last 24 hours) at 6/14/2022 1321  Last data filed at 6/14/2022 0832  Gross per 24 hour   Intake 1240 ml   Output 4000 ml   Net -2760 ml      Physical Exam  Vitals and nursing note reviewed.   Constitutional:       General: He is not in acute distress.     Appearance: Normal appearance. He is not ill-appearing.   HENT:      Head: Normocephalic and atraumatic.      Mouth/Throat:      Mouth: Mucous membranes are moist.   Eyes:      Extraocular Movements: Extraocular movements intact.      Pupils: Pupils are equal, round, and reactive to light.   Cardiovascular:      Rate and Rhythm: Normal rate and regular rhythm.      Pulses: Normal pulses.      Heart sounds: Normal heart sounds. No murmur  heard.  Pulmonary:      Effort: Pulmonary effort is normal.      Breath sounds: Normal breath sounds. No wheezing, rhonchi or rales.   Abdominal:      General: Bowel sounds are normal. There is no distension.      Palpations: Abdomen is soft.      Tenderness: There is no abdominal tenderness.   Musculoskeletal:         General: No tenderness.      Right lower leg: No edema.      Left lower leg: No edema.   Skin:     General: Skin is warm.      Capillary Refill: Capillary refill takes less than 2 seconds.      Findings: No erythema or rash.   Neurological:      General: No focal deficit present.      Mental Status: He is alert and oriented to person, place, and time.   Psychiatric:         Mood and Affect: Mood normal.         Thought Content: Thought content normal.       Significant Labs: All pertinent labs within the past 24 hours have been reviewed.  CBC:   Recent Labs   Lab 06/13/22 1702 06/14/22 0449   WBC 13.19* 15.82*   HGB 8.4* 8.9*   HCT 26.4* 27.6*    442     CMP:   Recent Labs   Lab 06/13/22 1702 06/14/22 0449    133*   K 4.1 4.2   CL 98 101   CO2 27 20*   GLU 85 94   BUN 46* 29*   CREATININE 12.8* 8.5*   CALCIUM 8.9 9.1   PROT 7.6 7.9   ALBUMIN 2.9* 2.8*   BILITOT 1.1* 1.5*   ALKPHOS 72 75   AST 14 11   ALT 9* 6*   ANIONGAP 15 12   EGFRNONAA 4.2* 6.9*     Significant Imaging: I have reviewed all pertinent imaging results/findings within the past 24 hours.    Goals of Care Treatment Preferences:  Code Status: Full Code    Consults:   Consults (From admission, onward)        Status Ordering Provider     Inpatient consult to Nephrology  Once        Provider:  (Not yet assigned)    Acknowledged VALERIE        Service: Hospital Medicine    Final Active Diagnoses:    Diagnosis Date Noted POA    PRINCIPAL PROBLEM:  Volume overload [E87.70] 06/13/2022 Unknown    Elevated troponin [R77.8] 06/13/2022 Unknown    Hypertensive urgency [I16.0] 06/13/2022 Unknown    CAD (coronary artery  disease), native coronary artery [I25.10] 11/21/2019 Yes    Mixed hyperlipidemia [E78.2] 11/21/2019 Yes    Iron deficiency anemia [D50.9] 04/05/2017 Yes    End stage renal failure on dialysis [N18.6, Z99.2] 06/05/2014 Not Applicable      Problems Resolved During this Admission:     Discharged Condition: fair    Disposition: Home or Self Care    Follow Up:   Follow-up Information     Tess Ledbetter MD Follow up on 6/23/2022.    Specialty: General Practice  Why: Established pt's hospital f/u visit on 6/23/22 @ 10:00am. Please bring discharge summary, ID, insurance card, and medication list.  Contact information:  [address] [facility] [place] [id number]  [phone]                       Patient Instructions:   No discharge procedures on file.    Significant Diagnostic Studies: Labs:   CMP   Recent Labs   Lab 06/13/22 1702 06/14/22  0449    133*   K 4.1 4.2   CL 98 101   CO2 27 20*   GLU 85 94   BUN 46* 29*   CREATININE 12.8* 8.5*   CALCIUM 8.9 9.1   PROT 7.6 7.9   ALBUMIN 2.9* 2.8*   BILITOT 1.1* 1.5*   ALKPHOS 72 75   AST 14 11   ALT 9* 6*   ANIONGAP 15 12   ESTGFRAFRICA 4.8* 7.9*   EGFRNONAA 4.2* 6.9*    and CBC   Recent Labs   Lab 06/13/22 1702 06/14/22  0449   WBC 13.19* 15.82*   HGB 8.4* 8.9*   HCT 26.4* 27.6*    442       Pending Diagnostic Studies:     Procedure Component Value Units Date/Time    HIV 1/2 Ag/Ab (4th Gen) [[id number]] Collected: 06/13/22 1702    Order Status: Sent Lab Status: In process Updated: 06/13/22 1707    Specimen: Blood     Hepatitis C Antibody [[id number]] Collected: 06/13/22 1702    Order Status: Sent Lab Status: In process Updated: 06/13/22 1707    Specimen: Blood          Medications:  Reconciled Home Medications:      Medication List      CHANGE how you take these medications    carvediloL 25 MG tablet  Commonly known as: COREG  TAKE 1 TABLET BY MOUTH TWICE A DAY WITH FOOD  What changed: when to take this     polyethylene glycol 17 gram/dose  powder  Commonly known as: GLYCOLAX  Mix 1 capful (17 grams) in liquid and drink by mouth once daily.  What changed:   · when to take this  · reasons to take this     sevelamer carbonate 800 mg Tab  Commonly known as: RENVELA  Take 1 tablet (800 mg total) by mouth 3 (three) times daily with meals.  What changed:   · how much to take  · how to take this  · when to take this        CONTINUE taking these medications    amLODIPine 10 MG tablet  Commonly known as: NORVASC  Take 1 tablet (10 mg total) by mouth once daily.     aspirin 81 MG EC tablet  Commonly known as: ECOTRIN  Take 1 tablet (81 mg total) by mouth once daily.     AURYXIA 210 mg iron Tab  Generic drug: ferric citrate  Take 420 mg by mouth 3 (three) times daily with meals. Take 1 with snacks     cinacalcet 90 MG Tab  Commonly known as: SENSIPAR  Take 90 mg by mouth once daily.     doxercalciferoL 2 mcg/mL Soln  2 mcg.     ethyl chloride 100% 100 % spray  SPRAY SMALL AMOUNT TO ACCESS SITE THREE TIMES A WEEK ON DIALYSIS DAYS     losartan 100 MG tablet  Commonly known as: COZAAR  Take 100 mg by mouth once daily.     MIRCERA INJ  75 mcg.        STOP taking these medications    atorvastatin 40 MG tablet  Commonly known as: LIPITOR            Indwelling Lines/Drains at time of discharge:   Lines/Drains/Airways     Drain  Duration                Hemodialysis AV Fistula Right forearm -- days              Time spent on the discharge of patient: 35 minutes    Michelle Fonseca MD  Department of Hospital Medicine  [facility] - Telemetry Stepdown

## 2022-06-14 NOTE — NURSING
Patient being discharge and discharge instructions reviewed. Medicines delivered from bedside pharmacy. Remove IV and telemetry monitor removed

## 2022-06-14 NOTE — ASSESSMENT & PLAN NOTE
Outpatient HD Information:    -Outpatient HD unit: [facility]   -Nephrologist: MD Malissa   -HD tx days: MWF   -HD tx time: 4hrs   -Last HD tx prior to presentation: 6/10/22  -HD access: RFA AVF   -HD modality: iHD   -Residual urine: Anuric   -EDW: 75.5kg     Plan/Recommendations:    -S/p HD this am and not appearing volume overloaded on physical exam/oxygenating well on room air/in no acute distress. No urgent indication for further HD today. Patient stable from nephrology standpoint for discharge. Recommend patient follow-up at his outpatient HD unit on 6/15/22 for his next HD treatment.   -Renal diet, if not NPO   -Strict I/O's and daily weights  -Daily renal function panels

## 2022-06-14 NOTE — CARE UPDATE
"Consult acknowledged.     46 y.o. male ESRD-HD MWF , missed today's HD, presents with volume overload.   Pt requires RRT for volume management.    Plan/Recommendations:   -iHD ordered  -Duration: 3 hours  -Goal Net UF: 3L as tolerated with a MAP>65  -Monitor for tolerance and side effets.  Full consult to follow.       BP (!) 225/107   Pulse 86   Temp 100 °F (37.8 °C) (Oral)   Resp 18   Ht 5' 8" (1.727 m)   Wt 75.8 kg (167 lb)   SpO2 96%   BMI 25.39 kg/m²     No intake/output data recorded.   -----------------------------  No intake/output data recorded.     Recent Labs   Lab 06/13/22  1702   WBC 13.19*   HGB 8.4*   HCT 26.4*      MONO 11.3  1.5*      Recent Labs   Lab 06/13/22  1702      K 4.1   CL 98   CO2 27   BUN 46*   CREATININE 12.8*   CALCIUM 8.9   PROT 7.6   BILITOT 1.1*   ALKPHOS 72   ALT 9*   AST 14      X-Ray Chest PA And Lateral    Result Date: 6/13/2022  1. Coarse interstitial attenuation is concerning for edema, correlation is advised. Electronically signed by: Roderick Rae MD Date:    06/13/2022 Time:    17:56       Erich Salinas MD  Nephrology Fellow  [facility]    "

## 2022-06-14 NOTE — NURSING
06/14/22 0330   Vital Signs   Pulse 79   Resp (!) 27   SpO2 96 %   BP (!) 199/96   MAP (mmHg) 138   Assessments (Pre/Post)   Blood Liters Processed (BLP) 58.7   Level of Consciousness (AVPU) alert   Dialyzer Clearance clear   Pre-Hemodialysis Assessment   Treatment Status Completed        Hemodialysis AV Fistula Right forearm   No Placement Date or Time found.   Present Prior to Hospital Arrival?: Yes  Hand Hygiene: Performed  Location: Right forearm   Needle Size 15ga   Site Assessment Clean;Dry;Intact;No redness;No swelling   Patency Present;Thrill;Bruit   Status Deaccessed   Flows Good   Site Condition No complications   Dressing Gauze   During Hemodialysis Assessment   Blood Flow Rate (mL/min) 400 mL/min   Dialysate Flow Rate (mL/min) 800 ml/min   Ultrafiltration Rate (mL/Hr) 1280 mL/Hr   Arteriovenous Lines Secure Yes   Arterial Pressure (mmHg) -170 mmHg   Venous Pressure (mmHg) 180   UF Removed (mL) 4000 mL   TMP 60   Venous Line in Air Detector Yes   Intake (mL) 250 mL   Transducer Dry Yes   Access Visible Yes    notified of access issue? N/A   Heparin given? N/A   Intra-Hemodialysis Comments treatment completed, bloodlines reinfused.   Post-Hemodialysis Assessment   Rinseback Volume (mL) 250 mL   Blood Volume Processed (Liters) 58.7 L   Dialyzer Clearance Lightly streaked   Duration of Treatment 180 minutes   Additional Fluid Intake (mL) 1000 mL   Total UF (mL) 4000 mL   Net Fluid Removal 3000   Patient Response to Treatment tolerated.   Arterial bleeding stop time (min) 5 min   Venous bleeding stop time (min) 5 min   Post-Hemodialysis Comments 3 L removed.  tolerated well.   1:1 bedside HD completed.  3 L removed.  See notes below.

## 2022-06-14 NOTE — ASSESSMENT & PLAN NOTE
Hypertensive urgency   ESRD    46-year-old with past medical history of ESRD secondary to uncontrolled hypertension currently on hemodialysis MWF who was admitted for volume overload in setting of missed HD session with associated significant hypertension.  Chest x-ray with mild edema.    -nephrology consult and with plans for HD/volume removal tonight  - restarted on home antihypertensive medications including Coreg 25 mg b.i.d., amlodipine 10 mg q.d. restart home losartan as tolerated.  - strict I&Os, daily weights, low-salt diet.  - Avoid nephrotoxic agents  - Renally adjust medications

## 2022-06-14 NOTE — ED NOTES
LOC: The patient is awake, alert and aware of environment with an appropriate affect, the patient is oriented x 3 and speaking appropriately.   APPEARANCE: Patient appears comfortable and in no acute distress, patient is clean and well groomed.  SKIN: The skin is warm and dry, color consistent with ethnicity.   MUSCULOSKELETAL: Patient moving all extremities spontaneously, no swelling noted.  RESPIRATORY: Airway is open and patent, respirations are spontaneous, patient has a normal effort and rate, no accessory muscle use noted.  CARDIAC: Patient has a normal rate and regular rhythm, no edema noted, capillary refill < 3 seconds. BP elevated  GASTRO: Soft and non tender to palpation, no distention noted.   : Pt denies any pain or frequency with urination.  NEURO: Pt opens eyes spontaneously, behavior appropriate to situation, follows commands.\

## 2022-06-14 NOTE — NURSING
Midnight vitals taken and extremely elevated. Dialysis nurse at bedside to initiate dialysis and see if that will bring down blood pressure prior to giving any medications.

## 2022-06-14 NOTE — PHARMACY MED REC
"Admission Medication History     The home medication history was taken by Rosa Huddleston.    You may go to "Admission" then "Reconcile Home Medications" tabs to review and/or act upon these items.      The home medication list has been updated by the Pharmacy department.    Please read ALL comments highlighted in yellow.    Please address this information as you see fit.     Feel free to contact us if you have any questions or require assistance.      The medications listed below were removed from the home medication list. Please reorder if appropriate:  Patient reports no longer taking the following medication(s):   ATORVASTATIN 40 MG   CALCIUM ACETATE 667 MG   HYDRALAZINE 25 MG   OXYCODONE 5 MG IR    SEVELAMER CARBONATE 800 MG   SUCROFERRIC OXYHYDROXIDE 500 MG      Medications listed below were obtained from: Patient/family   PTA Medications   Medication Sig    amLODIPine (NORVASC) 10 MG tablet   Take 1 tablet (10 mg total) by mouth once daily.    aspirin (ECOTRIN) 81 MG EC tablet   Take 1 tablet (81 mg total) by mouth once daily.    AURYXIA 210 mg iron Tab     Take 420 mg by mouth 3 (three) times daily with meals. Take 1 with snacks    carvediloL (COREG) 25 MG tablet   Take 25 mg by mouth once daily.    cinacalcet (SENSIPAR) 90 MG Tab   Take 90 mg by mouth once daily.    losartan (COZAAR) 100 MG tablet   Take 100 mg by mouth once daily.    polyethylene glycol (GLYCOLAX) 17 gram/dose powder    Take 17 g by mouth daily as needed (constipation).         Rosa Huddleston  [id number]                    .          "

## 2022-06-14 NOTE — HPI
"46 year old male with CAD, T2DM, HTN, ESRD on HD, s/p nephrectomy for suspected RCC on 05/19/22 who presented due to chest palpitations and pain and left arm.  Patient reports his "heart feels like it is racing" throughout the night over the past 3 months.  The symptoms wake him from sleep.  Last night he developed similar symptoms but also had achy pains from his left shoulder down to his left forearm.  He denies any chest pain during these episodes or chest pain currently.  These episodes are not associated with shortness of breath or diaphoresis.  Due to worsening symptoms he decided to present to the emergency room.    On arrival,was hypertensive up to systolic 225, T-max 100, heart rates in the 90s, on room air.  Labs notable for WBC of 13, chronic anemia with hemoglobin 8.4 (at baseline).  Troponin elevated at 0.141, EKG with no acute ischemic changes.  Chest x-ray with mild pulmonary edema.  He was given his home amlodipine 10 mg and does hydralazine 10 mg IV x1 then admitted to hospital medicine for further care.    Last HD session was Friday.  "

## 2022-06-14 NOTE — ED NOTES
MD states pt ok to go to floor with this pressure (195/95) to be dialyzed. Please call dialysis as soon as pt arrives to 8th floor

## 2022-06-14 NOTE — HPI
Haroldo Dickinson Sr is a 46 year old male with CAD, T2DM, HTN, ESRD on HD (MWF), s/p nephrectomy for suspected RCC on 05/19/22 who presented due to chest palpitations and pain and left arm. Patient last received dialysis on Friday 6/10/22, prior to presentation. Patient states he missed his Monday treatment due to chest pain, for which her presented to the hospital. Patient is anuric at baseline. On arrival, was hypertensive up to systolic 225, T-max 100, heart rates in the 90s, on room air.  Labs notable for WBC of 13, chronic anemia with hemoglobin 8.4. Troponin elevated at 0.141, EKG with no acute ischemic changes. Chest x-ray with mild pulmonary edema. He was given his home amlodipine 10 mg and does hydralazine 10 mg IV x1 then admitted to hospital medicine for further care. Nephrology was consulted for management of ESRD/HD. Patient subsequently dialyzed overnight. Patient states his symptoms have resolved s/p receiving dialysis, and he denied having any complaints at time of nephrology consultation.

## 2022-06-14 NOTE — PLAN OF CARE
Problem: Hemodynamic Instability (Hemodialysis)  Goal: Effective Tissue Perfusion  6/14/2022 0026 by Ayde Erickson RN  Outcome: Ongoing, Progressing  6/14/2022 0025 by Ayde Erickson RN  Outcome: Ongoing, Progressing     Problem: Infection (Hemodialysis)  Goal: Absence of Infection Signs and Symptoms  6/14/2022 0026 by Ayde Erickson RN  Outcome: Ongoing, Progressing  6/14/2022 0025 by Ayde Erickson RN  Outcome: Ongoing, Progressing

## 2022-06-14 NOTE — SUBJECTIVE & OBJECTIVE
Past Medical History:   Diagnosis Date    CAD (coronary artery disease), native coronary artery 11/21/2019    Cataract     Diabetes mellitus     Diabetic retinopathy     Dialysis patient     DM type 2 causing renal disease, not at goal     ESRD (end stage renal disease) started dialysis 01/2014 6/5/2014    Hyperparathyroidism, secondary renal 6/5/2014    Hypertension     NSTEMI (non-ST elevated myocardial infarction) 12/21/2013    Organ transplant candidate 6/5/2014    Pneumonia     Renal hypertension     Stroke        Past Surgical History:   Procedure Laterality Date    ANGIOGRAM, CORONARY, WITH LEFT HEART CATHETERIZATION N/A 7/1/2021    Procedure: Angiogram, Coronary, with Left Heart Cath;  Surgeon: Miki Zendejas MD;  Location: [facility] CATH LAB;  Service: Cardiology;  Laterality: N/A;    COLONOSCOPY N/A 5/3/2017    Procedure: COLONOSCOPY;  Surgeon: Rufino Carpenter MD;  [facility]  Service: Endoscopy;  Laterality: N/A;  pt states can only schedule on Wednesdays    COLONOSCOPY N/A 2/9/2021    Procedure: COLONOSCOPY;  Surgeon: Edil Wong MD;  [facility]  Service: Endoscopy;  Laterality: N/A;  Dialysis MWF/ labwork day of procedure  right arm aceess  per Dr. Colin pt can hold Plavix 5 days prior see note- sm  COVID test on 2/6/21 at [facility] - sm    COLONOSCOPY N/A 2/10/2021    Procedure: COLONOSCOPY;  Surgeon: Robert Ayers MD;  [facility]  Service: Endoscopy;  Laterality: N/A;  rescheduled due to poor bowel prep-BB  negative covid screening 2/6/21-BB  dialysis M-W-F-BB  okay to r/s for 2/9/21 and to hold Plavix per Dr. KIRAN Wong-BB  labs same day-BB    CORONARY STENT PLACEMENT N/A 7/25/2019    Procedure: INSERTION, STENT, CORONARY ARTERY;  Surgeon: Miki Zendejas MD;  Location: [facility] CATH LAB;  Service: Cardiology;  Laterality: N/A;    DIALYSIS FISTULA CREATION      FISTULOGRAM N/A 2/18/2019    Procedure: Fistulogram;  Surgeon: Yaneth De La Cruz MD;   Location: [facility] CATH LAB;  Service: Cardiology;  Laterality: N/A;    FISTULOGRAM Right 7/23/2019    Procedure: Fistulogram;  Surgeon: Oz Cordoba MD;  Location: [facility] CATH LAB;  Service: Cardiology;  Laterality: Right;    LAPAROSCOPIC ROBOT-ASSISTED SURGICAL REMOVAL OF KIDNEY USING DA JAIME XI Right 5/19/2022    Procedure: XI ROBOTIC NEPHRECTOMY;  Surgeon: Aidan Hendricks MD;  [facility];  Service: Urology;  Laterality: Right;  3hrs    LEFT HEART CATHETERIZATION Left 7/25/2019    Procedure: Left heart cath;  Surgeon: Miki Zendejas MD;  Location: [facility] CATH LAB;  Service: Cardiology;  Laterality: Left;    RETINAL LASER PROCEDURE Bilateral 2018 or 2017    Dr. Rothman    VASCULAR SURGERY         Review of patient's allergies indicates:   Allergen Reactions    No known allergies      Current Facility-Administered Medications   Medication Frequency    0.9%  NaCl infusion Once    acetaminophen tablet 650 mg Q6H PRN    amLODIPine tablet 10 mg Daily    aspirin EC tablet 81 mg Daily    atorvastatin tablet 40 mg Daily    benzonatate capsule 100 mg TID PRN    carvediloL tablet 25 mg BID WM    dextrose 10% bolus 125 mL PRN    dextrose 10% bolus 250 mL PRN    glucagon (human recombinant) injection 1 mg PRN    glucose chewable tablet 16 g PRN    glucose chewable tablet 24 g PRN    hydrALAZINE tablet 25 mg BID PRN    losartan tablet 100 mg Daily    naloxone 0.4 mg/mL injection 0.02 mg PRN    sevelamer carbonate tablet 800 mg TID WM    sodium chloride 0.9% bolus 250 mL PRN    sodium chloride 0.9% flush 10 mL Q12H PRN     Facility-Administered Medications Ordered in Other Encounters   Medication Frequency    ceFAZolin injection 2 g On Call Procedure    lidocaine (PF) 10 mg/ml (1%) injection 10 mg Once     Family History       Problem Relation (Age of Onset)    Cancer Maternal Grandfather    Cataracts Mother    Diabetes Mother    Heart disease Brother    Hypertension Mother, Father, Sister, Brother     Kidney disease Brother          Tobacco Use    Smoking status: Never Smoker    Smokeless tobacco: Never Used   Substance and Sexual Activity    Alcohol use: Not Currently     Comment: One drink a month    Drug use: No    Sexual activity: Yes     Partners: Female     Birth control/protection: None     Review of Systems   Constitutional: Negative.    HENT: Negative.     Eyes: Negative.    Respiratory: Negative.     Cardiovascular: Negative.    Gastrointestinal: Negative.    Genitourinary:  Positive for decreased urine volume (anuric at baseline).   Musculoskeletal: Negative.    Skin: Negative.    Neurological: Negative.    Psychiatric/Behavioral: Negative.     Objective:     Vital Signs (Most Recent):  Temp: 98 °F (36.7 °C) (06/14/22 1131)  Pulse: 82 (06/14/22 1131)  Resp: 18 (06/14/22 1131)  BP: (!) 176/83 (06/14/22 1131)  SpO2: (!) 93 % (06/14/22 1131)  O2 Device (Oxygen Therapy): room air (06/14/22 1131)   Vital Signs (24h Range):  Temp:  [98 °F (36.7 °C)-100 °F (37.8 °C)] 98 °F (36.7 °C)  Pulse:  [77-97] 82  Resp:  [6-56] 18  SpO2:  [91 %-100 %] 93 %  BP: (141-225)/() 176/83     Weight: 72.6 kg (160 lb 0.9 oz) (06/14/22 0600)  Body mass index is 24.34 kg/m².  Body surface area is 1.87 meters squared.    I/O last 3 completed shifts:  In: 1000 [Other:1000]  Out: 4000 [Other:4000]    Physical Exam  Constitutional:       Appearance: Normal appearance.   HENT:      Head: Normocephalic and atraumatic.      Nose: Nose normal.      Mouth/Throat:      Mouth: Mucous membranes are moist.      Pharynx: Oropharynx is clear.   Eyes:      Conjunctiva/sclera: Conjunctivae normal.   Cardiovascular:      Rate and Rhythm: Normal rate.      Comments: RFA AVF with +thrill and +bruit   Pulmonary:      Effort: Pulmonary effort is normal.      Breath sounds: Normal breath sounds.   Abdominal:      General: Abdomen is flat.      Palpations: Abdomen is soft.   Musculoskeletal:         General: Normal range of motion.      Cervical  back: Normal range of motion and neck supple.      Right lower leg: No edema.      Left lower leg: No edema.   Skin:     General: Skin is warm and dry.   Neurological:      General: No focal deficit present.      Mental Status: He is alert and oriented to person, place, and time.   Psychiatric:         Mood and Affect: Mood normal.         Behavior: Behavior normal.       Significant Labs:  CBC:   Recent Labs   Lab 06/14/22  0449   WBC 15.82*   RBC 2.82*   HGB 8.9*   HCT 27.6*      MCV 98   MCH 31.6*   MCHC 32.2     CMP:   Recent Labs   Lab 06/14/22  0449   GLU 94   CALCIUM 9.1   ALBUMIN 2.8*   PROT 7.9   *   K 4.2   CO2 20*      BUN 29*   CREATININE 8.5*   ALKPHOS 75   ALT 6*   AST 11   BILITOT 1.5*     All labs within the past 24 hours have been reviewed.

## 2022-06-14 NOTE — SUBJECTIVE & OBJECTIVE
Past Medical History:   Diagnosis Date    CAD (coronary artery disease), native coronary artery 11/21/2019    Cataract     Diabetes mellitus     Diabetic retinopathy     Dialysis patient     DM type 2 causing renal disease, not at goal     ESRD (end stage renal disease) started dialysis 01/2014 6/5/2014    Hyperparathyroidism, secondary renal 6/5/2014    Hypertension     NSTEMI (non-ST elevated myocardial infarction) 12/21/2013    Organ transplant candidate 6/5/2014    Pneumonia     Renal hypertension     Stroke        Past Surgical History:   Procedure Laterality Date    ANGIOGRAM, CORONARY, WITH LEFT HEART CATHETERIZATION N/A 7/1/2021    Procedure: Angiogram, Coronary, with Left Heart Cath;  Surgeon: Miki Zendejas MD;  Location: [facility] CATH LAB;  Service: Cardiology;  Laterality: N/A;    COLONOSCOPY N/A 5/3/2017    Procedure: COLONOSCOPY;  Surgeon: Rufino Carpenter MD;  [facility]  Service: Endoscopy;  Laterality: N/A;  pt states can only schedule on Wednesdays    COLONOSCOPY N/A 2/9/2021    Procedure: COLONOSCOPY;  Surgeon: Edil Wong MD;  [facility]  Service: Endoscopy;  Laterality: N/A;  Dialysis MWF/ labwork day of procedure  right arm aceess  per Dr. Colin pt can hold Plavix 5 days prior see note- sm  COVID test on 2/6/21 at [facility] - sm    COLONOSCOPY N/A 2/10/2021    Procedure: COLONOSCOPY;  Surgeon: Robert Ayers MD;  [facility]  Service: Endoscopy;  Laterality: N/A;  rescheduled due to poor bowel prep-BB  negative covid screening 2/6/21-BB  dialysis M-W-F-BB  okay to r/s for 2/9/21 and to hold Plavix per Dr. KIRAN Wong-BB  labs same day-BB    CORONARY STENT PLACEMENT N/A 7/25/2019    Procedure: INSERTION, STENT, CORONARY ARTERY;  Surgeon: Miki Zendejas MD;  Location: [facility] CATH LAB;  Service: Cardiology;  Laterality: N/A;    DIALYSIS FISTULA CREATION      FISTULOGRAM N/A 2/18/2019    Procedure: Fistulogram;  Surgeon: Yaneth De La Cruz MD;   Location: [facility] CATH LAB;  Service: Cardiology;  Laterality: N/A;    FISTULOGRAM Right 7/23/2019    Procedure: Fistulogram;  Surgeon: Oz Cordoba MD;  Location: [facility] CATH LAB;  Service: Cardiology;  Laterality: Right;    LAPAROSCOPIC ROBOT-ASSISTED SURGICAL REMOVAL OF KIDNEY USING DA JAIME XI Right 5/19/2022    Procedure: XI ROBOTIC NEPHRECTOMY;  Surgeon: Aidan Hendricks MD;  Location: [facility] OR [facility];  Service: Urology;  Laterality: Right;  3hrs    LEFT HEART CATHETERIZATION Left 7/25/2019    Procedure: Left heart cath;  Surgeon: Miki Zendejas MD;  Location: [facility] CATH LAB;  Service: Cardiology;  Laterality: Left;    RETINAL LASER PROCEDURE Bilateral 2018 or 2017    Dr. Rothman    VASCULAR SURGERY         Review of patient's allergies indicates:   Allergen Reactions    No known allergies        Current Facility-Administered Medications on File Prior to Encounter   Medication    ceFAZolin injection 2 g    lidocaine (PF) 10 mg/ml (1%) injection 10 mg     Current Outpatient Medications on File Prior to Encounter   Medication Sig    amLODIPine (NORVASC) 10 MG tablet Take 1 tablet (10 mg total) by mouth once daily.    carvediloL (COREG) 25 MG tablet TAKE 1 TABLET BY MOUTH TWICE A DAY WITH FOOD    losartan (COZAAR) 100 MG tablet Take 100 mg by mouth once daily.    sevelamer carbonate (RENVELA) 800 mg Tab     aspirin (ECOTRIN) 81 MG EC tablet Take 1 tablet (81 mg total) by mouth once daily.    AURYXIA 210 mg iron Tab     calcium acetate (PHOSLO) 667 mg capsule Take 1,334 mg by mouth 3 (three) times daily with meals.    cinacalcet (SENSIPAR) 30 MG Tab 90 mg once daily.    cinacalcet (SENSIPAR) 60 MG Tab     doxercalciferoL 2 mcg/mL Soln 2 mcg.    ethyl chloride 100% 100 % spray SPRAY SMALL AMOUNT TO ACCESS SITE THREE TIMES A WEEK ON DIALYSIS DAYS    hydrALAZINE (APRESOLINE) 25 MG tablet Take 25 mg by mouth 2 (two) times daily.    methoxy peg-epoetin beta (MIRCERA INJ) 75 mcg.    oxyCODONE (ROXICODONE) 5 MG  immediate release tablet Take 1 tablet (5 mg total) by mouth every 4 (four) hours as needed for Pain.    polyethylene glycol (GLYCOLAX) 17 gram/dose powder Mix 1 capful (17 grams) in liquid and drink by mouth once daily.    sucroferric oxyhydroxide (VELPHORO) 500 mg Chew Take 2 tablets by mouth.    UNABLE TO FIND medication name: Dr Ramires's sleep aid    [DISCONTINUED] atorvastatin (LIPITOR) 40 MG tablet Take 1 tablet (40 mg total) by mouth once daily. (Patient not taking: No sig reported)     Family History       Problem Relation (Age of Onset)    Cancer Maternal Grandfather    Cataracts Mother    Diabetes Mother    Heart disease Brother    Hypertension Mother, Father, Sister, Brother    Kidney disease Brother          Tobacco Use    Smoking status: Never Smoker    Smokeless tobacco: Never Used   Substance and Sexual Activity    Alcohol use: Not Currently     Comment: One drink a month    Drug use: No    Sexual activity: Yes     Partners: Female     Birth control/protection: None     Review of Systems   Constitutional:  Negative for activity change, appetite change, chills, fatigue and fever.   HENT:  Negative for congestion, rhinorrhea and sore throat.    Respiratory:  Negative for cough, shortness of breath and wheezing.    Cardiovascular:  Positive for palpitations. Negative for chest pain and leg swelling.   Gastrointestinal:  Negative for abdominal pain, constipation, diarrhea, nausea and vomiting.   Genitourinary:  Negative for difficulty urinating, dysuria and hematuria.   Musculoskeletal:  Negative for back pain, myalgias and neck pain.   Skin:  Negative for rash.   Neurological:  Negative for weakness and headaches.   Psychiatric/Behavioral:  Negative for agitation and confusion.    Objective:     Vital Signs (Most Recent):  Temp: 98.6 °F (37 °C) (06/13/22 2105)  Pulse: 87 (06/13/22 2105)  Resp: 18 (06/13/22 2105)  BP: (!) 193/98 (06/13/22 2105)  SpO2: 100 % (06/13/22 2105)   Vital Signs (24h Range):  Temp:   [98.6 °F (37 °C)-100 °F (37.8 °C)] 98.6 °F (37 °C)  Pulse:  [80-97] 87  Resp:  [18] 18  SpO2:  [95 %-100 %] 100 %  BP: (174-225)/() 193/98     Weight: 75.8 kg (167 lb)  Body mass index is 25.39 kg/m².    Physical Exam  Constitutional:       General: He is not in acute distress.     Appearance: Normal appearance. He is not ill-appearing.   HENT:      Head: Normocephalic and atraumatic.      Mouth/Throat:      Mouth: Mucous membranes are moist.   Eyes:      Conjunctiva/sclera: Conjunctivae normal.   Cardiovascular:      Rate and Rhythm: Normal rate.      Pulses: Normal pulses.      Comments: Hypertensive  Pulmonary:      Comments: Bibasilar crackles  Abdominal:      General: Abdomen is flat. There is no distension.      Palpations: Abdomen is soft.      Tenderness: There is no abdominal tenderness. There is no guarding or rebound.      Comments: Midline surgical scar appears to be well healing no surrounding erythema or evidence of infection   Musculoskeletal:      Right lower leg: No edema.      Left lower leg: No edema.   Skin:     Capillary Refill: Capillary refill takes less than 2 seconds.      Findings: No rash.   Neurological:      Mental Status: He is alert and oriented to person, place, and time.   Psychiatric:         Mood and Affect: Mood normal.         Behavior: Behavior normal.           Significant Labs: All pertinent labs within the past 24 hours have been reviewed.  CBC:   Recent Labs   Lab 06/13/22  1702   WBC 13.19*   HGB 8.4*   HCT 26.4*        CMP:   Recent Labs   Lab 06/13/22  1702      K 4.1   CL 98   CO2 27   GLU 85   BUN 46*   CREATININE 12.8*   CALCIUM 8.9   PROT 7.6   ALBUMIN 2.9*   BILITOT 1.1*   ALKPHOS 72   AST 14   ALT 9*   ANIONGAP 15   EGFRNONAA 4.2*       Significant Imaging: I have reviewed all pertinent imaging results/findings within the past 24 hours.

## 2022-06-14 NOTE — CONSULTS
[facility] - Tele[facility] Stepdown  Nephrology  Consult Note    Patient Name: Haroldo Dickinson Sr.  MRN: [id number]  Admission Date: 6/13/2022  Hospital Length of Stay: 0 days  Attending Provider: Fareed Cuevas MD   Primary Care Physician: Tess Ledbetter MD  Principal Problem:Volume overload    Inpatient consult to Nephrology  Consult performed by: Stanley Singh NP  Consult ordered by: Werner Castillo MD  Reason for consult: ESRD on HD         Subjective:     HPI: Haroldo Dickinson Sr is a 46 year old male with CAD, T2DM, HTN, ESRD on HD (MWF), s/p nephrectomy for suspected RCC on 05/19/22 who presented due to chest palpitations and pain and left arm. Patient last received dialysis on Friday 6/10/22, prior to presentation. Patient states he missed his Monday treatment due to chest pain, for which her presented to the hospital. Patient is anuric at baseline. On arrival, was hypertensive up to systolic 225, T-max 100, heart rates in the 90s, on room air.  Labs notable for WBC of 13, chronic anemia with hemoglobin 8.4. Troponin elevated at 0.141, EKG with no acute ischemic changes. Chest x-ray with mild pulmonary edema. He was given his home amlodipine 10 mg and does hydralazine 10 mg IV x1 then admitted to hospital medicine for further care. Nephrology was consulted for management of ESRD/HD. Patient subsequently dialyzed overnight. Patient states his symptoms have resolved s/p receiving dialysis, and he denied having any complaints at time of nephrology consultation.       Past Medical History:   Diagnosis Date    CAD (coronary artery disease), native coronary artery 11/21/2019    Cataract     Diabetes mellitus     Diabetic retinopathy     Dialysis patient     DM type 2 causing renal disease, not at goal     ESRD (end stage renal disease) started dialysis 01/2014 6/5/2014    Hyperparathyroidism, secondary renal 6/5/2014    Hypertension     NSTEMI (non-ST elevated myocardial infarction) 12/21/2013    Organ  transplant candidate 6/5/2014    Pneumonia     Renal hypertension     Stroke        Past Surgical History:   Procedure Laterality Date    ANGIOGRAM, CORONARY, WITH LEFT HEART CATHETERIZATION N/A 7/1/2021    Procedure: Angiogram, Coronary, with Left Heart Cath;  Surgeon: Miki Zendejas MD;  Location: [facility] CATH LAB;  Service: Cardiology;  Laterality: N/A;    COLONOSCOPY N/A 5/3/2017    Procedure: COLONOSCOPY;  Surgeon: Rufino Carpenter MD;  Location: [facility] ENDO (4TH FLR);  Service: Endoscopy;  Laterality: N/A;  pt states can only schedule on Wednesdays    COLONOSCOPY N/A 2/9/2021    Procedure: COLONOSCOPY;  Surgeon: Edil Wong MD;  Location: [facility] ENDO (4TH FLR);  Service: Endoscopy;  Laterality: N/A;  Dialysis MWF/ labwork day of procedure  right arm aceess  per Dr. Colin pt can hold Plavix 5 days prior see note- sm  COVID test on 2/6/21 at [facility] - sm    COLONOSCOPY N/A 2/10/2021    Procedure: COLONOSCOPY;  Surgeon: Robert Ayers MD;  Location: [facility] (4TH FLR);  Service: Endoscopy;  Laterality: N/A;  rescheduled due to poor bowel prep-BB  negative covid screening 2/6/21-BB  dialysis M-W-F-BB  okay to r/s for 2/9/21 and to hold Plavix per Dr. KIRAN Wong-BB  labs same day-BB    CORONARY STENT PLACEMENT N/A 7/25/2019    Procedure: INSERTION, STENT, CORONARY ARTERY;  Surgeon: Miki Zendejas MD;  Location: [facility] CATH LAB;  Service: Cardiology;  Laterality: N/A;    DIALYSIS FISTULA CREATION      FISTULOGRAM N/A 2/18/2019    Procedure: Fistulogram;  Surgeon: Yaneth De La Cruz MD;  Location: [facility] CATH LAB;  Service: Cardiology;  Laterality: N/A;    FISTULOGRAM Right 7/23/2019    Procedure: Fistulogram;  Surgeon: Oz Cordoba MD;  Location: [facility] CATH LAB;  Service: Cardiology;  Laterality: Right;    LAPAROSCOPIC ROBOT-ASSISTED SURGICAL REMOVAL OF KIDNEY USING DA JAIME XI Right 5/19/2022    Procedure: XI ROBOTIC NEPHRECTOMY;  Surgeon: Aidan Hendricks MD;  [address]  Service:  Urology;  Laterality: Right;  3hrs    LEFT HEART CATHETERIZATION Left 7/25/2019    Procedure: Left heart cath;  Surgeon: Miki Zendejas MD;  Location: [facility] CATH LAB;  Service: Cardiology;  Laterality: Left;    RETINAL LASER PROCEDURE Bilateral 2018 or 2017    Dr. Rtohman    VASCULAR SURGERY         Review of patient's allergies indicates:   Allergen Reactions    No known allergies      Current Facility-Administered Medications   Medication Frequency    0.9%  NaCl infusion Once    acetaminophen tablet 650 mg Q6H PRN    amLODIPine tablet 10 mg Daily    aspirin EC tablet 81 mg Daily    atorvastatin tablet 40 mg Daily    benzonatate capsule 100 mg TID PRN    carvediloL tablet 25 mg BID WM    dextrose 10% bolus 125 mL PRN    dextrose 10% bolus 250 mL PRN    glucagon (human recombinant) injection 1 mg PRN    glucose chewable tablet 16 g PRN    glucose chewable tablet 24 g PRN    hydrALAZINE tablet 25 mg BID PRN    losartan tablet 100 mg Daily    naloxone 0.4 mg/mL injection 0.02 mg PRN    sevelamer carbonate tablet 800 mg TID WM    sodium chloride 0.9% bolus 250 mL PRN    sodium chloride 0.9% flush 10 mL Q12H PRN     Facility-Administered Medications Ordered in Other Encounters   Medication Frequency    ceFAZolin injection 2 g On Call Procedure    lidocaine (PF) 10 mg/ml (1%) injection 10 mg Once     Family History       Problem Relation (Age of Onset)    Cancer Maternal Grandfather    Cataracts Mother    Diabetes Mother    Heart disease Brother    Hypertension Mother, Father, Sister, Brother    Kidney disease Brother          Tobacco Use    Smoking status: Never Smoker    Smokeless tobacco: Never Used   Substance and Sexual Activity    Alcohol use: Not Currently     Comment: One drink a month    Drug use: No    Sexual activity: Yes     Partners: Female     Birth control/protection: None     Review of Systems   Constitutional: Negative.    HENT: Negative.     Eyes: Negative.     Respiratory: Negative.     Cardiovascular: Negative.    Gastrointestinal: Negative.    Genitourinary:  Positive for decreased urine volume (anuric at baseline).   Musculoskeletal: Negative.    Skin: Negative.    Neurological: Negative.    Psychiatric/Behavioral: Negative.     Objective:     Vital Signs (Most Recent):  Temp: 98 °F (36.7 °C) (06/14/22 1131)  Pulse: 82 (06/14/22 1131)  Resp: 18 (06/14/22 1131)  BP: (!) 176/83 (06/14/22 1131)  SpO2: (!) 93 % (06/14/22 1131)  O2 Device (Oxygen Therapy): room air (06/14/22 1131)   Vital Signs (24h Range):  Temp:  [98 °F (36.7 °C)-100 °F (37.8 °C)] 98 °F (36.7 °C)  Pulse:  [77-97] 82  Resp:  [6-56] 18  SpO2:  [91 %-100 %] 93 %  BP: (141-225)/() 176/83     Weight: 72.6 kg (160 lb 0.9 oz) (06/14/22 0600)  Body mass index is 24.34 kg/m².  Body surface area is 1.87 meters squared.    I/O last 3 completed shifts:  In: 1000 [Other:1000]  Out: 4000 [Other:4000]    Physical Exam  Constitutional:       Appearance: Normal appearance.   HENT:      Head: Normocephalic and atraumatic.      Nose: Nose normal.      Mouth/Throat:      Mouth: Mucous membranes are moist.      Pharynx: Oropharynx is clear.   Eyes:      Conjunctiva/sclera: Conjunctivae normal.   Cardiovascular:      Rate and Rhythm: Normal rate.      Comments: RFA AVF with +thrill and +bruit   Pulmonary:      Effort: Pulmonary effort is normal.      Breath sounds: Normal breath sounds.   Abdominal:      General: Abdomen is flat.      Palpations: Abdomen is soft.   Musculoskeletal:         General: Normal range of motion.      Cervical back: Normal range of motion and neck supple.      Right lower leg: No edema.      Left lower leg: No edema.   Skin:     General: Skin is warm and dry.   Neurological:      General: No focal deficit present.      Mental Status: He is alert and oriented to person, place, and time.   Psychiatric:         Mood and Affect: Mood normal.         Behavior: Behavior normal.       Significant  Labs:  CBC:   Recent Labs   Lab 06/14/22  0449   WBC 15.82*   RBC 2.82*   HGB 8.9*   HCT 27.6*      MCV 98   MCH 31.6*   MCHC 32.2     CMP:   Recent Labs   Lab 06/14/22  0449   GLU 94   CALCIUM 9.1   ALBUMIN 2.8*   PROT 7.9   *   K 4.2   CO2 20*      BUN 29*   CREATININE 8.5*   ALKPHOS 75   ALT 6*   AST 11   BILITOT 1.5*     All labs within the past 24 hours have been reviewed.      Assessment/Plan:     * Volume overload  Volume overload in setting of ESRD/anuria and having missed 1 dialysis treatment     -UF with HD for volume management   -1.5L/day fluid restriction     Chronic kidney disease-mineral and bone disorder  Phos 2.1    -Hypophosphatemic, discontinued renvela     Anemia in ESRD (end-stage renal disease)  Hgb 8.9    -Transfuse for Hgb <7.0  -Defer LONNY/iron therapy to outpatient HD unit     End stage renal failure on dialysis  Outpatient HD Information:    -Outpatient HD [place] [facility]   -Nephrologist: MD Malissa   -HD tx days: MWF   -HD tx time: 4hrs   -Last HD tx prior to presentation: 6/10/22  -HD access: RFA AVF   -HD modality: iHD   -Residual urine: Anuric   -EDW: 75.5kg     Plan/Recommendations:    -S/p HD this am and not appearing volume overloaded on physical exam/oxygenating well on room air/in no acute distress. No urgent indication for further HD today. Patient stable from nephrology standpoint for discharge. Recommend patient follow-up at his outpatient HD unit on 6/15/22 for his next HD treatment.   -Renal diet, if not NPO   -Strict I/O's and daily weights  -Daily renal function panels         Thank you for your consult. I will follow-up with patient. Please contact us if you have any additional questions.    Stanley Singh, NP  Nephrology  [facility] - Telemetry Stepdown

## 2022-06-14 NOTE — H&P
"[facility] - Telemetry [facility] Medicine  History & Physical    Patient Name: Haroldo Dickinson Sr.  MRN: [id number]  Patient Class: OP- Observation  Admission Date: 6/13/2022  Attending Physician: Fareed Cuevas MD   Primary Care Provider: Tess Ledbetter MD         Patient information was obtained from patient, past medical records and ER records.     Subjective:     Principal Problem:Volume overload    Chief Complaint:   Chief Complaint   Patient presents with    Chest Pain     Has stents dialysis friday        HPI: 46 year old male with CAD, T2DM, HTN, ESRD on HD, s/p nephrectomy for suspected RCC on 05/19/22 who presented due to chest palpitations and pain and left arm.  Patient reports his "heart feels like it is racing" throughout the night over the past 3 months.  The symptoms wake him from sleep.  Last night he developed similar symptoms but also had achy pains from his left shoulder down to his left forearm.  He denies any chest pain during these episodes or chest pain currently.  These episodes are not associated with shortness of breath or diaphoresis.  Due to worsening symptoms he decided to present to the emergency room.    On arrival,was hypertensive up to systolic 225, T-max 100, heart rates in the 90s, on room air.  Labs notable for WBC of 13, chronic anemia with hemoglobin 8.4 (at baseline).  Troponin elevated at 0.141, EKG with no acute ischemic changes.  Chest x-ray with mild pulmonary edema.  He was given his home amlodipine 10 mg and does hydralazine 10 mg IV x1 then admitted to hospital medicine for further care.    Last HD session was Friday.      Past Medical History:   Diagnosis Date    CAD (coronary artery disease), native coronary artery 11/21/2019    Cataract     Diabetes mellitus     Diabetic retinopathy     Dialysis patient     DM type 2 causing renal disease, not at goal     ESRD (end stage renal disease) started dialysis 01/2014 6/5/2014    Hyperparathyroidism, " secondary renal 6/5/2014    Hypertension     NSTEMI (non-ST elevated myocardial infarction) 12/21/2013    Organ transplant candidate 6/5/2014    Pneumonia     Renal hypertension     Stroke        Past Surgical History:   Procedure Laterality Date    ANGIOGRAM, CORONARY, WITH LEFT HEART CATHETERIZATION N/A 7/1/2021    Procedure: Angiogram, Coronary, with Left Heart Cath;  Surgeon: Miki Zendejas MD;  Location: [facility] CATH LAB;  Service: Cardiology;  Laterality: N/A;    COLONOSCOPY N/A 5/3/2017    Procedure: COLONOSCOPY;  Surgeon: Rufino Carpenter MD;  [facility]  Service: Endoscopy;  Laterality: N/A;  pt states can only schedule on Wednesdays    COLONOSCOPY N/A 2/9/2021    Procedure: COLONOSCOPY;  Surgeon: Edil Wong MD;  [facility]  Service: Endoscopy;  Laterality: N/A;  Dialysis MWF/ labwork day of procedure  right arm aceess  per Dr. Colin pt can hold Plavix 5 days prior see note- sm  COVID test on 2/6/21 at [facility] -     COLONOSCOPY N/A 2/10/2021    Procedure: COLONOSCOPY;  Surgeon: Robert Ayers MD;  Location: [facility] (4TH FLR);  Service: Endoscopy;  Laterality: N/A;  rescheduled due to poor bowel prep-BB  negative covid screening 2/6/21-BB  dialysis M-W-F-BB  okay to r/s for 2/9/21 and to hold Plavix per Dr. KIRAN Wong-BB  labs same day-BB    CORONARY STENT PLACEMENT N/A 7/25/2019    Procedure: INSERTION, STENT, CORONARY ARTERY;  Surgeon: Miki Zendejas MD;  Location: [facility] CATH LAB;  Service: Cardiology;  Laterality: N/A;    DIALYSIS FISTULA CREATION      FISTULOGRAM N/A 2/18/2019    Procedure: Fistulogram;  Surgeon: Yaneth De La Cruz MD;  Location: [facility] CATH LAB;  Service: Cardiology;  Laterality: N/A;    FISTULOGRAM Right 7/23/2019    Procedure: Fistulogram;  Surgeon: Oz Cordoba MD;  Location: [facility] CATH LAB;  Service: Cardiology;  Laterality: Right;    LAPAROSCOPIC ROBOT-ASSISTED SURGICAL REMOVAL OF KIDNEY USING DA JAIME XI Right 5/19/2022     Procedure: XI ROBOTIC NEPHRECTOMY;  Surgeon: Aidan Hendricks MD;  Location: [facility] OR [address] [place] [address];  Service: Urology;  Laterality: Right;  3hrs    LEFT HEART CATHETERIZATION Left 7/25/2019    Procedure: Left heart cath;  Surgeon: Miki Zendejas MD;  Location: [facility] CATH LAB;  Service: Cardiology;  Laterality: Left;    RETINAL LASER PROCEDURE Bilateral 2018 or 2017    Dr. Rothman    VASCULAR SURGERY         Review of patient's allergies indicates:   Allergen Reactions    No known allergies        Current Facility-Administered Medications on File Prior to Encounter   Medication    ceFAZolin injection 2 g    lidocaine (PF) 10 mg/ml (1%) injection 10 mg     Current Outpatient Medications on File Prior to Encounter   Medication Sig    amLODIPine (NORVASC) 10 MG tablet Take 1 tablet (10 mg total) by mouth once daily.    carvediloL (COREG) 25 MG tablet TAKE 1 TABLET BY MOUTH TWICE A DAY WITH FOOD    losartan (COZAAR) 100 MG tablet Take 100 mg by mouth once daily.    sevelamer carbonate (RENVELA) 800 mg Tab     aspirin (ECOTRIN) 81 MG EC tablet Take 1 tablet (81 mg total) by mouth once daily.    AURYXIA 210 mg iron Tab     calcium acetate (PHOSLO) 667 mg capsule Take 1,334 mg by mouth 3 (three) times daily with meals.    cinacalcet (SENSIPAR) 30 MG Tab 90 mg once daily.    cinacalcet (SENSIPAR) 60 MG Tab     doxercalciferoL 2 mcg/mL Soln 2 mcg.    ethyl chloride 100% 100 % spray SPRAY SMALL AMOUNT TO ACCESS SITE THREE TIMES A WEEK ON DIALYSIS DAYS    hydrALAZINE (APRESOLINE) 25 MG tablet Take 25 mg by mouth 2 (two) times daily.    methoxy peg-epoetin beta (MIRCERA INJ) 75 mcg.    oxyCODONE (ROXICODONE) 5 MG immediate release tablet Take 1 tablet (5 mg total) by mouth every 4 (four) hours as needed for Pain.    polyethylene glycol (GLYCOLAX) 17 gram/dose powder Mix 1 capful (17 grams) in liquid and drink by mouth once daily.    sucroferric oxyhydroxide (VELPHORO) 500 mg Chew Take 2 tablets by mouth.     UNABLE TO FIND medication name: Dr Ramires's sleep aid    [DISCONTINUED] atorvastatin (LIPITOR) 40 MG tablet Take 1 tablet (40 mg total) by mouth once daily. (Patient not taking: No sig reported)     Family History       Problem Relation (Age of Onset)    Cancer Maternal Grandfather    Cataracts Mother    Diabetes Mother    Heart disease Brother    Hypertension Mother, Father, Sister, Brother    Kidney disease Brother          Tobacco Use    Smoking status: Never Smoker    Smokeless tobacco: Never Used   Substance and Sexual Activity    Alcohol use: Not Currently     Comment: One drink a month    Drug use: No    Sexual activity: Yes     Partners: Female     Birth control/protection: None     Review of Systems   Constitutional:  Negative for activity change, appetite change, chills, fatigue and fever.   HENT:  Negative for congestion, rhinorrhea and sore throat.    Respiratory:  Negative for cough, shortness of breath and wheezing.    Cardiovascular:  Positive for palpitations. Negative for chest pain and leg swelling.   Gastrointestinal:  Negative for abdominal pain, constipation, diarrhea, nausea and vomiting.   Genitourinary:  Negative for difficulty urinating, dysuria and hematuria.   Musculoskeletal:  Negative for back pain, myalgias and neck pain.   Skin:  Negative for rash.   Neurological:  Negative for weakness and headaches.   Psychiatric/Behavioral:  Negative for agitation and confusion.    Objective:     Vital Signs (Most Recent):  Temp: 98.6 °F (37 °C) (06/13/22 2105)  Pulse: 87 (06/13/22 2105)  Resp: 18 (06/13/22 2105)  BP: (!) 193/98 (06/13/22 2105)  SpO2: 100 % (06/13/22 2105)   Vital Signs (24h Range):  Temp:  [98.6 °F (37 °C)-100 °F (37.8 °C)] 98.6 °F (37 °C)  Pulse:  [80-97] 87  Resp:  [18] 18  SpO2:  [95 %-100 %] 100 %  BP: (174-225)/() 193/98     Weight: 75.8 kg (167 lb)  Body mass index is 25.39 kg/m².    Physical Exam  Constitutional:       General: He is not in acute distress.      Appearance: Normal appearance. He is not ill-appearing.   HENT:      Head: Normocephalic and atraumatic.      Mouth/Throat:      Mouth: Mucous membranes are moist.   Eyes:      Conjunctiva/sclera: Conjunctivae normal.   Cardiovascular:      Rate and Rhythm: Normal rate.      Pulses: Normal pulses.      Comments: Hypertensive  Pulmonary:      Comments: Bibasilar crackles  Abdominal:      General: Abdomen is flat. There is no distension.      Palpations: Abdomen is soft.      Tenderness: There is no abdominal tenderness. There is no guarding or rebound.      Comments: Midline surgical scar appears to be well healing no surrounding erythema or evidence of infection   Musculoskeletal:      Right lower leg: No edema.      Left lower leg: No edema.   Skin:     Capillary Refill: Capillary refill takes less than 2 seconds.      Findings: No rash.   Neurological:      Mental Status: He is alert and oriented to person, place, and time.   Psychiatric:         Mood and Affect: Mood normal.         Behavior: Behavior normal.           Significant Labs: All pertinent labs within the past 24 hours have been reviewed.  CBC:   Recent Labs   Lab 06/13/22  1702   WBC 13.19*   HGB 8.4*   HCT 26.4*        CMP:   Recent Labs   Lab 06/13/22  1702      K 4.1   CL 98   CO2 27   GLU 85   BUN 46*   CREATININE 12.8*   CALCIUM 8.9   PROT 7.6   ALBUMIN 2.9*   BILITOT 1.1*   ALKPHOS 72   AST 14   ALT 9*   ANIONGAP 15   EGFRNONAA 4.2*       Significant Imaging: I have reviewed all pertinent imaging results/findings within the past 24 hours.    Assessment/Plan:     * Volume overload  Hypertensive urgency   ESRD    46-year-old with past medical history of ESRD secondary to uncontrolled hypertension currently on hemodialysis MWF who was admitted for volume overload in setting of missed HD session with associated significant hypertension.  Chest x-ray with mild edema.    -nephrology consult and with plans for HD/volume removal tonight  -  restarted on home antihypertensive medications including Coreg 25 mg b.i.d., amlodipine 10 mg q.d. restart home losartan as tolerated.  - strict I&Os, daily weights, low-salt diet.  - Avoid nephrotoxic agents  - Renally adjust medications      Elevated troponin  - troponin minimally elevated in setting of ESRD and hypertensive urgency.  Likely secondary to demand ischemia.  No active chest pain or recent chest pain.  EKG with no ischemic changes  - telemetry      Mixed hyperlipidemia  - continue atorvastatin      CAD (coronary artery disease), native coronary artery  - history of stents greater than 1 year ago  - continue home aspirin and atorvastatin      Iron deficiency anemia  - stable and at baseline      End stage renal failure on dialysis  - as above         VTE Risk Mitigation (From admission, onward)         Ordered     IP VTE HIGH RISK PATIENT  Once         06/13/22 1905     Place sequential compression device  Until discontinued         06/13/22 1905                   Werner Castillo MD  Department of Hospital Medicine   [facility] - Tele[facility] Stepdown

## 2022-06-14 NOTE — ASSESSMENT & PLAN NOTE
Volume overload in setting of ESRD/anuria and having missed 1 dialysis treatment     -UF with HD for volume management   -1.5L/day fluid restriction

## 2022-06-14 NOTE — PLAN OF CARE
Problem: Device-Related Complication Risk (Hemodialysis)  Goal: Safe, Effective Therapy Delivery  Outcome: Ongoing, Progressing

## 2022-06-14 NOTE — PLAN OF CARE
Problem: Adult Inpatient Plan of Care  Goal: Plan of Care Review  Outcome: Ongoing, Progressing  Goal: Patient-Specific Goal (Individualized)  Outcome: Ongoing, Progressing  Goal: Absence of Hospital-Acquired Illness or Injury  Outcome: Ongoing, Progressing  Goal: Optimal Comfort and Wellbeing  Outcome: Ongoing, Progressing  Goal: Readiness for Transition of Care  Outcome: Ongoing, Progressing     Problem: Device-Related Complication Risk (Hemodialysis)  Goal: Safe, Effective Therapy Delivery  Outcome: Ongoing, Progressing     Problem: Hemodynamic Instability (Hemodialysis)  Goal: Effective Tissue Perfusion  Outcome: Ongoing, Progressing     Problem: Infection (Hemodialysis)  Goal: Absence of Infection Signs and Symptoms  Outcome: Ongoing, Progressing     Problem: Diabetes Comorbidity  Goal: Blood Glucose Level Within Targeted Range  Outcome: Ongoing, Progressing     Problem: Fall Injury Risk  Goal: Absence of Fall and Fall-Related Injury  Outcome: Ongoing, Progressing     Assumed care of pt at apx 2105. Vitals taken and unstable as pt is awaiting dialysis at the bedside. Pt oriented to room and unit. Tele resumed on pt as ordered. Pt made comfy in bed. Will continue to monitor.

## 2022-06-18 LAB
BACTERIA BLD CULT: NORMAL
BACTERIA BLD CULT: NORMAL

## 2022-07-31 NOTE — NURSING
CALLED [facility] DEPARTMENT, PT MORNING LAB WAS NOT DRAW. TECH STATED SHE WOULD BE UP TO DRAW THE MORNING LAB. DAY CHARGE NURSE NOTIFIED. MANUELA PEARCE   + gi bleed seen by surgery advised ct scan will admit to medicine family does not want aggressive interventions

## 2022-08-10 ENCOUNTER — TELEPHONE (OUTPATIENT)
Dept: TRANSPLANT | Facility: CLINIC | Age: 47
End: 2022-08-10
Payer: MEDICARE

## 2022-08-10 DIAGNOSIS — Z76.82 PRE-KIDNEY TRANSPLANT, LISTED: Primary | ICD-10-CM

## 2022-08-10 NOTE — TELEPHONE ENCOUNTER
Called patient to confirm that he will attend appts on 8/16/22. Patient states he will attend and will be riding with LYFERNANDO. I asked if Ms. Stockton is still caring for him and brining him to appointments and he stated no she is not. He states that his family has agreed to be his caregivers for after transplant, but they cannot take off of work for appointments. I asked him if one caregiver could meet him for the 12:30 appointment on 8/16/22 to meet with the . Patient stated he would ask one of them.     I faxed appointment slips to dialysis unit where patient is currently. Patient no showed Urology follow up in July with Dr. Hendricks post op Nephrectomy. Patient states he did not receive that appointment in the mail. Confirmed mailing address with patient.

## 2022-08-11 ENCOUNTER — TELEPHONE (OUTPATIENT)
Dept: UROLOGY | Facility: CLINIC | Age: 47
End: 2022-08-11
Payer: MEDICARE

## 2022-08-11 NOTE — TELEPHONE ENCOUNTER
1301-I spoke to pt Haroldo and let him know if he still needs MRI Abd no contrast after I ask Dr. Hendricks. Regardless, pt will need f/u [facility] for clearance for kidney transplant.  I will f/u with pt.  Pt LETHA.

## 2022-08-12 ENCOUNTER — TELEPHONE (OUTPATIENT)
Dept: TRANSPLANT | Facility: CLINIC | Age: 47
End: 2022-08-12
Payer: MEDICARE

## 2022-08-12 ENCOUNTER — TELEPHONE (OUTPATIENT)
Dept: UROLOGY | Facility: CLINIC | Age: 47
End: 2022-08-12
Payer: MEDICARE

## 2022-08-12 NOTE — TELEPHONE ENCOUNTER
I LVM now for pt to make a Dr. Hendricks office visit only.  No MRI Abd needed.  I left our Urology 's number, [phone], to call to schedule an appt.    Per Eladio:  No imaging necessary right now.  Just set up a post-op appt to discuss surveillance strategy.

## 2022-08-12 NOTE — TELEPHONE ENCOUNTER
----- Message from Kvng Taveras sent at 8/12/2022  9:47 AM CDT -----  Regarding: speak to coordinator  Keely @ [facility] returning call. Requesting a call back.      Call: [phone]

## 2022-08-12 NOTE — TELEPHONE ENCOUNTER
----- Message from Meri Venegas sent at 8/12/2022  8:20 AM CDT -----  Regarding: Speak with office  Contact: Keely([facility])  Keely from [facility] is calling to speak with pt nurse coordinator Miguelina.    [phone]

## 2022-08-15 ENCOUNTER — TELEPHONE (OUTPATIENT)
Dept: TRANSPLANT | Facility: CLINIC | Age: 47
End: 2022-08-15
Payer: MEDICARE

## 2022-08-15 NOTE — TELEPHONE ENCOUNTER
----- Message from Miguelina Baum RN sent at 8/12/2022  9:58 AM CDT -----  Regarding: FW: speak to coordinator    ----- Message -----  From: Kvng Taveras  Sent: 8/12/2022   9:48 AM CDT  To: Kidney Waitlisted Coordinator  Subject: speak to coordinator                             Keely @ [facility] returning call. Requesting a call back.      Call: [phone]

## 2022-08-25 ENCOUNTER — TELEPHONE (OUTPATIENT)
Dept: TRANSPLANT | Facility: CLINIC | Age: 47
End: 2022-08-25
Payer: MEDICARE

## 2022-10-05 ENCOUNTER — TELEPHONE (OUTPATIENT)
Dept: TRANSPLANT | Facility: CLINIC | Age: 47
End: 2022-10-05
Payer: MEDICARE

## 2022-10-11 ENCOUNTER — TELEPHONE (OUTPATIENT)
Dept: TRANSPLANT | Facility: CLINIC | Age: 47
End: 2022-10-11
Payer: MEDICARE

## 2022-10-13 ENCOUNTER — OFFICE VISIT (OUTPATIENT)
Dept: PODIATRY | Facility: CLINIC | Age: 47
End: 2022-10-13
Payer: MEDICARE

## 2022-10-13 VITALS — BODY MASS INDEX: 24.26 KG/M2 | WEIGHT: 160.06 LBS | HEIGHT: 68 IN

## 2022-10-13 DIAGNOSIS — Z99.2 CONTROLLED TYPE 2 DIABETES MELLITUS WITH CHRONIC KIDNEY DISEASE ON CHRONIC DIALYSIS, WITHOUT LONG-TERM CURRENT USE OF INSULIN: Primary | ICD-10-CM

## 2022-10-13 DIAGNOSIS — E11.22 CONTROLLED TYPE 2 DIABETES MELLITUS WITH CHRONIC KIDNEY DISEASE ON CHRONIC DIALYSIS, WITHOUT LONG-TERM CURRENT USE OF INSULIN: Primary | ICD-10-CM

## 2022-10-13 DIAGNOSIS — B35.1 ONYCHOMYCOSIS DUE TO DERMATOPHYTE: ICD-10-CM

## 2022-10-13 DIAGNOSIS — N18.6 CONTROLLED TYPE 2 DIABETES MELLITUS WITH CHRONIC KIDNEY DISEASE ON CHRONIC DIALYSIS, WITHOUT LONG-TERM CURRENT USE OF INSULIN: Primary | ICD-10-CM

## 2022-10-13 PROCEDURE — 99203 OFFICE O/P NEW LOW 30 MIN: CPT | Mod: S$GLB,,, | Performed by: PODIATRIST

## 2022-10-13 PROCEDURE — 3044F HG A1C LEVEL LT 7.0%: CPT | Mod: CPTII,S$GLB,, | Performed by: PODIATRIST

## 2022-10-13 PROCEDURE — 99999 PR PBB SHADOW E&M-EST. PATIENT-LVL III: ICD-10-PCS | Mod: PBBFAC,,, | Performed by: PODIATRIST

## 2022-10-13 PROCEDURE — 4010F PR ACE/ARB THEARPY RXD/TAKEN: ICD-10-PCS | Mod: CPTII,S$GLB,, | Performed by: PODIATRIST

## 2022-10-13 PROCEDURE — 3044F PR MOST RECENT HEMOGLOBIN A1C LEVEL <7.0%: ICD-10-PCS | Mod: CPTII,S$GLB,, | Performed by: PODIATRIST

## 2022-10-13 PROCEDURE — 3066F NEPHROPATHY DOC TX: CPT | Mod: CPTII,S$GLB,, | Performed by: PODIATRIST

## 2022-10-13 PROCEDURE — 1159F MED LIST DOCD IN RCRD: CPT | Mod: CPTII,S$GLB,, | Performed by: PODIATRIST

## 2022-10-13 PROCEDURE — 3066F PR DOCUMENTATION OF TREATMENT FOR NEPHROPATHY: ICD-10-PCS | Mod: CPTII,S$GLB,, | Performed by: PODIATRIST

## 2022-10-13 PROCEDURE — 1159F PR MEDICATION LIST DOCUMENTED IN MEDICAL RECORD: ICD-10-PCS | Mod: CPTII,S$GLB,, | Performed by: PODIATRIST

## 2022-10-13 PROCEDURE — 99203 PR OFFICE/OUTPT VISIT, NEW, LEVL III, 30-44 MIN: ICD-10-PCS | Mod: S$GLB,,, | Performed by: PODIATRIST

## 2022-10-13 PROCEDURE — 4010F ACE/ARB THERAPY RXD/TAKEN: CPT | Mod: CPTII,S$GLB,, | Performed by: PODIATRIST

## 2022-10-13 PROCEDURE — 99999 PR PBB SHADOW E&M-EST. PATIENT-LVL III: CPT | Mod: PBBFAC,,, | Performed by: PODIATRIST

## 2022-10-13 RX ORDER — CICLOPIROX 80 MG/ML
SOLUTION TOPICAL NIGHTLY
Qty: 6.6 ML | Refills: 3 | Status: SHIPPED | OUTPATIENT
Start: 2022-10-13 | End: 2022-10-20

## 2022-10-13 NOTE — PATIENT INSTRUCTIONS
Kerasal for fungal nails apply daily to all toenails.  Can be found at [facility]

## 2022-10-17 NOTE — PROGRESS NOTES
Subjective:      Patient ID: Haroldo Dickinson Sr. is a 47 y.o. male.    Chief Complaint: Nail Care, Diabetes Mellitus (5/12/22 Dr De Anda), and Diabetic Foot Exam    Haroldo is a 47 y.o. male who presents to the clinic upon referral from Dr. Marshall  for evaluation and treatment of diabetic feet. Haroldo has a past medical history of CAD (coronary artery disease), native coronary artery (11/21/2019), Cataract, Diabetes mellitus, Diabetic retinopathy, Dialysis patient, DM type 2 causing renal disease, not at goal, ESRD (end stage renal disease) started dialysis 01/2014 (6/5/2014), Hyperparathyroidism, secondary renal (6/5/2014), Hypertension, NSTEMI (non-ST elevated myocardial infarction) (12/21/2013), Organ transplant candidate (6/5/2014), Pneumonia, Renal hypertension, and Stroke. Presents for diabetic foot risk assessment.     PCP: Tess Ledbetter MD    Date Last Seen by PCP: per above    Current shoe gear: Tennis shoes    Hemoglobin A1C   Date Value Ref Range Status   05/12/2022 4.7 4.0 - 5.6 % Final     Comment:     ADA Screening Guidelines:  5.7-6.4%  Consistent with prediabetes  >or=6.5%  Consistent with diabetes    High levels of fetal hemoglobin interfere with the HbA1C  assay. Heterozygous hemoglobin variants (HbS, HgC, etc)do  not significantly interfere with this assay.   However, presence of multiple variants may affect accuracy.     06/05/2014 4.4 (L) 4.5 - 6.2 % Final   12/16/2013 5.9 4.5 - 6.2 % Final         Review of Systems   Constitutional: Negative for chills.   Cardiovascular:  Negative for chest pain and claudication.   Respiratory:  Negative for cough.    Skin:  Positive for color change, dry skin and nail changes.   Musculoskeletal:  Positive for joint pain.   Gastrointestinal:  Negative for nausea.   Neurological:  Positive for paresthesias. Negative for numbness.   Psychiatric/Behavioral:  The patient is not nervous/anxious.          Objective:      Physical Exam  Constitutional:       Appearance: He  is well-developed.      Comments: Oriented to time, place, and person.   Cardiovascular:      Comments: DP and PT pulses are palpable bilaterally. 3 sec capillary refill time and toes and feet are warm to touch proximally .  There is  hair growth on the feet and toes b/l. There is no edema b/l. No spider veins or varicosities present b/l.     Musculoskeletal:      Comments: Equinus noted b/l ankles with < 10 deg DF noted. MMT 5/5 in DF/PF/Inv/Ev resistance with no reproduction of pain in any direction. Passive range of motion of ankle and pedal joints is painless b/l.     Feet:      Right foot:      Skin integrity: No callus or dry skin.      Left foot:      Skin integrity: No callus or dry skin.   Lymphadenopathy:      Comments: Negative lymphadenopathy bilateral popliteal fossa and tarsal tunnel.   Skin:     Comments: No open lesions, lacerations or wounds noted.Interdigital spaces clean, dry and intact b/l. No erythema noted to b/l foot.    Toenails 1-5 bilaterally are elongated by 2-3 mm, thickened by 2-3 mm, discolored/yellowed, dystrophic, brittle with subungual debris.       Neurological:      Mental Status: He is alert.      Comments: Light touch, proprioception, and sharp/dull sensation are all intact bilaterally. Protective threshold with the Edgar-Wienstein monofilament is intact bilaterally.    Psychiatric:         Behavior: Behavior is cooperative.             Assessment:       Encounter Diagnoses   Name Primary?    Controlled type 2 diabetes mellitus with chronic kidney disease on chronic dialysis, without long-term current use of insulin Yes    Onychomycosis due to dermatophyte          Plan:       Haroldo was seen today for nail care, diabetes mellitus and diabetic foot exam.    Diagnoses and all orders for this visit:    Controlled type 2 diabetes mellitus with chronic kidney disease on chronic dialysis, without long-term current use of insulin    Onychomycosis due to dermatophyte    Other orders  -      ciclopirox (PENLAC) 8 % Soln; Apply topically nightly.    I counseled the patient on his conditions, their implications and medical management.      - Shoe inspection. Diabetic Foot Education. Patient reminded of the importance of good nutrition and blood sugar control to help prevent podiatric complications of diabetes. Patient instructed on proper foot hygeine. We discussed wearing proper shoe gear, daily foot inspections, never walking without protective shoe gear, caution putting sharp instruments to feet     - Discussed DM foot care:  Wear comfortable, proper fitting shoes. Wash feet daily. Dry well. After drying, apply moisturizer to feet (no lotion to webspaces). Inspect feet daily for skin breaks, blisters, swelling, or redness. Wear cotton socks (preferably white)  Change socks every day. Do NOT walk barefoot. Do NOT use heating pads or warm/hot water soaks     At patient's request, I discussed different treatments for toenail fungus. We discussed oral antifungals but I did not recommend them as a first line treatment since the medication is taken internally and can have side effects such as rash, taste disturbances, and liver enzyme elevation. We discussed topical Penlac to be applied daily and removed weekly. Pt. Expresses understanding and would like to try the Penlac. Rx sent to the pharmacy.     F/u 6 -12 months DM foot exam sooner PRN.

## 2022-10-20 ENCOUNTER — HOSPITAL ENCOUNTER (OUTPATIENT)
Dept: RADIOLOGY | Facility: HOSPITAL | Age: 47
Discharge: HOME | End: 2022-10-20
Attending: NURSE PRACTITIONER
Payer: MEDICARE

## 2022-10-20 ENCOUNTER — OFFICE VISIT (OUTPATIENT)
Dept: TRANSPLANT | Facility: CLINIC | Age: 47
End: 2022-10-20
Attending: NURSE PRACTITIONER
Payer: MEDICARE

## 2022-10-20 ENCOUNTER — HOSPITAL ENCOUNTER (OUTPATIENT)
Dept: CARDIOLOGY | Facility: HOSPITAL | Age: 47
Discharge: HOME | End: 2022-10-20
Attending: NURSE PRACTITIONER
Payer: MEDICARE

## 2022-10-20 VITALS
HEIGHT: 68 IN | BODY MASS INDEX: 24.25 KG/M2 | HEART RATE: 80 BPM | WEIGHT: 160 LBS | DIASTOLIC BLOOD PRESSURE: 78 MMHG | SYSTOLIC BLOOD PRESSURE: 150 MMHG

## 2022-10-20 VITALS
WEIGHT: 162.94 LBS | RESPIRATION RATE: 16 BRPM | SYSTOLIC BLOOD PRESSURE: 171 MMHG | TEMPERATURE: 97 F | BODY MASS INDEX: 24.7 KG/M2 | HEIGHT: 68 IN | OXYGEN SATURATION: 100 % | HEART RATE: 63 BPM | DIASTOLIC BLOOD PRESSURE: 83 MMHG

## 2022-10-20 DIAGNOSIS — N18.9 CHRONIC KIDNEY DISEASE-MINERAL AND BONE DISORDER: ICD-10-CM

## 2022-10-20 DIAGNOSIS — I12.9 RENAL HYPERTENSION: ICD-10-CM

## 2022-10-20 DIAGNOSIS — E83.9 CHRONIC KIDNEY DISEASE-MINERAL AND BONE DISORDER: ICD-10-CM

## 2022-10-20 DIAGNOSIS — Z76.82 ORGAN TRANSPLANT CANDIDATE: ICD-10-CM

## 2022-10-20 DIAGNOSIS — E78.2 MIXED HYPERLIPIDEMIA: ICD-10-CM

## 2022-10-20 DIAGNOSIS — E11.22 CONTROLLED TYPE 2 DIABETES MELLITUS WITH CHRONIC KIDNEY DISEASE ON CHRONIC DIALYSIS, WITHOUT LONG-TERM CURRENT USE OF INSULIN: ICD-10-CM

## 2022-10-20 DIAGNOSIS — Z99.2 CONTROLLED TYPE 2 DIABETES MELLITUS WITH CHRONIC KIDNEY DISEASE ON CHRONIC DIALYSIS, WITHOUT LONG-TERM CURRENT USE OF INSULIN: ICD-10-CM

## 2022-10-20 DIAGNOSIS — I25.10 CORONARY ARTERY DISEASE INVOLVING NATIVE CORONARY ARTERY OF NATIVE HEART WITHOUT ANGINA PECTORIS: ICD-10-CM

## 2022-10-20 DIAGNOSIS — Z76.82 PATIENT ON WAITING LIST FOR KIDNEY TRANSPLANT: Primary | ICD-10-CM

## 2022-10-20 DIAGNOSIS — M89.9 CHRONIC KIDNEY DISEASE-MINERAL AND BONE DISORDER: ICD-10-CM

## 2022-10-20 DIAGNOSIS — N18.6 CONTROLLED TYPE 2 DIABETES MELLITUS WITH CHRONIC KIDNEY DISEASE ON CHRONIC DIALYSIS, WITHOUT LONG-TERM CURRENT USE OF INSULIN: ICD-10-CM

## 2022-10-20 LAB
ASCENDING AORTA: 3.29 CM
AV INDEX (PROSTH): 0.43
AV MEAN GRADIENT: 11 MMHG
AV PEAK GRADIENT: 20 MMHG
AV VALVE AREA: 1.82 CM2
AV VELOCITY RATIO: 0.5
BSA FOR ECHO PROCEDURE: 1.87 M2
CV ECHO LV RWT: 0.51 CM
DOP CALC AO PEAK VEL: 2.26 M/S
DOP CALC AO VTI: 47.95 CM
DOP CALC LVOT AREA: 4.2 CM2
DOP CALC LVOT DIAMETER: 2.31 CM
DOP CALC LVOT PEAK VEL: 1.13 M/S
DOP CALC LVOT STROKE VOLUME: 87.34 CM3
DOP CALCLVOT PEAK VEL VTI: 20.85 CM
E WAVE DECELERATION TIME: 300.64 MSEC
E/A RATIO: 1.2
E/E' RATIO: 7.71 M/S
ECHO LV POSTERIOR WALL: 1.3 CM (ref 0.6–1.1)
EJECTION FRACTION: 60 %
FRACTIONAL SHORTENING: 29 % (ref 28–44)
INTERVENTRICULAR SEPTUM: 1.3 CM (ref 0.6–1.1)
LA MAJOR: 4.9 CM
LA MINOR: 4.83 CM
LA WIDTH: 4.29 CM
LEFT ATRIUM SIZE: 4.18 CM
LEFT ATRIUM VOLUME INDEX MOD: 23.9 ML/M2
LEFT ATRIUM VOLUME INDEX: 39.9 ML/M2
LEFT ATRIUM VOLUME MOD: 44.46 CM3
LEFT ATRIUM VOLUME: 74.15 CM3
LEFT INTERNAL DIMENSION IN SYSTOLE: 3.59 CM (ref 2.1–4)
LEFT VENTRICLE DIASTOLIC VOLUME INDEX: 65.13 ML/M2
LEFT VENTRICLE DIASTOLIC VOLUME: 121.15 ML
LEFT VENTRICLE MASS INDEX: 143 G/M2
LEFT VENTRICLE SYSTOLIC VOLUME INDEX: 29.1 ML/M2
LEFT VENTRICLE SYSTOLIC VOLUME: 54.1 ML
LEFT VENTRICULAR INTERNAL DIMENSION IN DIASTOLE: 5.05 CM (ref 3.5–6)
LEFT VENTRICULAR MASS: 265.93 G
LV LATERAL E/E' RATIO: 6 M/S
LV SEPTAL E/E' RATIO: 10.8 M/S
MV A" WAVE DURATION": 14.84 MSEC
MV PEAK A VEL: 0.45 M/S
MV PEAK E VEL: 0.54 M/S
MV STENOSIS PRESSURE HALF TIME: 87.19 MS
MV VALVE AREA P 1/2 METHOD: 2.52 CM2
PISA TR MAX VEL: 1.98 M/S
PULM VEIN S/D RATIO: 1.26
PV PEAK D VEL: 0.34 M/S
PV PEAK S VEL: 0.43 M/S
RA MAJOR: 3.92 CM
RA PRESSURE: 3 MMHG
RA WIDTH: 3.32 CM
RIGHT VENTRICULAR END-DIASTOLIC DIMENSION: 3.34 CM
RV TISSUE DOPPLER FREE WALL SYSTOLIC VELOCITY 1 (APICAL 4 CHAMBER VIEW): 13.67 CM/S
SINUS: 3.38 CM
STJ: 2.95 CM
TDI LATERAL: 0.09 M/S
TDI SEPTAL: 0.05 M/S
TDI: 0.07 M/S
TR MAX PG: 16 MMHG
TRICUSPID ANNULAR PLANE SYSTOLIC EXCURSION: 1.95 CM
TV REST PULMONARY ARTERY PRESSURE: 19 MMHG

## 2022-10-20 PROCEDURE — 71046 X-RAY EXAM CHEST 2 VIEWS: CPT | Mod: TC,TXP

## 2022-10-20 PROCEDURE — 99215 OFFICE O/P EST HI 40 MIN: CPT | Mod: S$GLB,TXP,, | Performed by: NURSE PRACTITIONER

## 2022-10-20 PROCEDURE — 3044F HG A1C LEVEL LT 7.0%: CPT | Mod: CPTII,S$GLB,TXP, | Performed by: NURSE PRACTITIONER

## 2022-10-20 PROCEDURE — 3079F PR MOST RECENT DIASTOLIC BLOOD PRESSURE 80-89 MM HG: ICD-10-PCS | Mod: CPTII,S$GLB,TXP, | Performed by: NURSE PRACTITIONER

## 2022-10-20 PROCEDURE — 76770 US EXAM ABDO BACK WALL COMP: CPT | Mod: TC,TXP

## 2022-10-20 PROCEDURE — 93356 ECHO (CUPID ONLY): ICD-10-PCS | Mod: TXP,,, | Performed by: INTERNAL MEDICINE

## 2022-10-20 PROCEDURE — 93306 ECHO (CUPID ONLY): ICD-10-PCS | Mod: 26,TXP,, | Performed by: INTERNAL MEDICINE

## 2022-10-20 PROCEDURE — 71046 XR CHEST PA AND LATERAL: ICD-10-PCS | Mod: 26,TXP,, | Performed by: RADIOLOGY

## 2022-10-20 PROCEDURE — 76770 US EXAM ABDO BACK WALL COMP: CPT | Mod: 26,TXP,, | Performed by: RADIOLOGY

## 2022-10-20 PROCEDURE — 99999 PR PBB SHADOW E&M-EST. PATIENT-LVL III: CPT | Mod: PBBFAC,TXP,, | Performed by: NURSE PRACTITIONER

## 2022-10-20 PROCEDURE — 3077F PR MOST RECENT SYSTOLIC BLOOD PRESSURE >= 140 MM HG: ICD-10-PCS | Mod: CPTII,S$GLB,TXP, | Performed by: NURSE PRACTITIONER

## 2022-10-20 PROCEDURE — 3066F PR DOCUMENTATION OF TREATMENT FOR NEPHROPATHY: ICD-10-PCS | Mod: CPTII,S$GLB,TXP, | Performed by: NURSE PRACTITIONER

## 2022-10-20 PROCEDURE — 71046 X-RAY EXAM CHEST 2 VIEWS: CPT | Mod: 26,TXP,, | Performed by: RADIOLOGY

## 2022-10-20 PROCEDURE — 3077F SYST BP >= 140 MM HG: CPT | Mod: CPTII,S$GLB,TXP, | Performed by: NURSE PRACTITIONER

## 2022-10-20 PROCEDURE — 3044F PR MOST RECENT HEMOGLOBIN A1C LEVEL <7.0%: ICD-10-PCS | Mod: CPTII,S$GLB,TXP, | Performed by: NURSE PRACTITIONER

## 2022-10-20 PROCEDURE — 99215 PR OFFICE/OUTPT VISIT, EST, LEVL V, 40-54 MIN: ICD-10-PCS | Mod: S$GLB,TXP,, | Performed by: NURSE PRACTITIONER

## 2022-10-20 PROCEDURE — 93306 TTE W/DOPPLER COMPLETE: CPT | Mod: 26,TXP,, | Performed by: INTERNAL MEDICINE

## 2022-10-20 PROCEDURE — 76770 US RETROPERITONEAL COMPLETE: ICD-10-PCS | Mod: 26,TXP,, | Performed by: RADIOLOGY

## 2022-10-20 PROCEDURE — 99999 PR PBB SHADOW E&M-EST. PATIENT-LVL III: ICD-10-PCS | Mod: PBBFAC,TXP,, | Performed by: NURSE PRACTITIONER

## 2022-10-20 PROCEDURE — 4010F PR ACE/ARB THEARPY RXD/TAKEN: ICD-10-PCS | Mod: CPTII,S$GLB,TXP, | Performed by: NURSE PRACTITIONER

## 2022-10-20 PROCEDURE — 93356 MYOCRD STRAIN IMG SPCKL TRCK: CPT | Mod: TXP

## 2022-10-20 PROCEDURE — 4010F ACE/ARB THERAPY RXD/TAKEN: CPT | Mod: CPTII,S$GLB,TXP, | Performed by: NURSE PRACTITIONER

## 2022-10-20 PROCEDURE — 3079F DIAST BP 80-89 MM HG: CPT | Mod: CPTII,S$GLB,TXP, | Performed by: NURSE PRACTITIONER

## 2022-10-20 PROCEDURE — 93356 MYOCRD STRAIN IMG SPCKL TRCK: CPT | Mod: TXP,,, | Performed by: INTERNAL MEDICINE

## 2022-10-20 PROCEDURE — 3066F NEPHROPATHY DOC TX: CPT | Mod: CPTII,S$GLB,TXP, | Performed by: NURSE PRACTITIONER

## 2022-10-20 NOTE — PROGRESS NOTES
Kidney Transplant Recipient Reevalulation    Referring Physician: Kristy Pastrana  Current Nephrologist: Kristy Pastrana  Waitlist Status: inactive  Dialysis Start Date: 6/23/2017    Subjective:     CC:  Annual reassessment of kidney transplant candidacy.    HPI:  Mr. Dickinson is a 47 y.o. year old Black or  male with ESRD secondary to diabetic nephropathy.  He has been on the wait list for a kidney transplant at [facility] since 12/16/2013. Patient is currently on hemodialysis started on 6/23/2017. Patient is dialyzing on MWF schedule.  Patient reports that he is tolerating dialysis well.. He has a RUE AV fistula. He is dialyzing for 4 hours per session.  Patient denies any recent hospitalizations or ED visits.    PMH CVA with residual Right sided Weakness    Inactive (status 7): reason needed nephrectomy for renal mass, done in May but never followed up with Dr. Hendricks post op    fx assessment   Walks with a cane for support. Is able to move around without problems. Lives on the 2nd story and will go up and down stairs without OSEGUERA. Tries to walk but has not walked as much due to the weather. Does not appear frail.    Hospitalizations/ED visits:  none    Interval History:   Did present to clinic today with caregiver. Discussed needing clearance from Urology along with new lesions on his kidney that he needs to discuss with Dr. Hendricks. Patient and caregiver aware he is inactive.     CXR: 10/20/2022 unremarkable  Renal US: 10/20/2022 Three left renal hyperechoic lesions without definite internal flow, two of which appear new when compared to the previous ultrasound.Postoperative change of right nephrectomy    CT A/P w/wo: 1/4/2022 Liver hypodensity, which could represent fat deposition or transient hepatic attenuation differences.  It was not seen definitely at the time the previous study    ABD US: 2/24/2022 Homogeneous echotexture.  No focal hepatic lesion identified.Solid-appearing right renal lesion  at the lower pole measuring up to 3.8 cm, previously 2.6 cm on comparison 2021 ultrasound.  Solid renal neoplasm to be considered    CT A/P noncon: 9/10/20 per surgeon test result is favorable for transplant    Echo:  Results for orders placed during the hospital encounter of 10/20/22    Echo    Interpretation Summary  · The estimated ejection fraction is 60%.  · The left ventricle is normal in size with concentric hypertrophy and normal systolic function.  · Indeterminate left ventricular diastolic function.  · Normal right ventricular size with normal right ventricular systolic function.  · Mild left atrial enlargement.  · The estimated PA systolic pressure is 19 mmHg.  · Normal central venous pressure (3 mmHg).      Stress:  Results for orders placed during the hospital encounter of 03/11/21    Lexiscan Card Interp    Interpretation Summary    Abnormal myocardial perfusion scan.    There is a small to moderate sized, reversible defect that is consistent with ischemia in the basal to apical inferior wall(s). This defect is only seen in the short axis images, however, there is not an obvious source of soft tissue attenuation to account for this defect.    The gated perfusion images showed an ejection fraction of 67% at rest. The gated perfusion images showed an ejection fraction of 71% post stress. Normal ejection fraction is greater than 51%.    There is normal wall motion at rest and post stress.    LV cavity size is normal at rest and normal at stress.    The EKG portion of this study is abnormal but not diagnostic.    The patient reported no chest pain during the stress test.    There were no arrhythmias during stress.    A PET stress exam is recommended depending on clinical conditions and judgement.    When compared to the previous study from 3/28/2019, There are significant changes based on the report. Previous images are not available.    Left heart catheterization 7/1/2021  The Mid RCA lesion was 100%  stenosed.  The Mid LAD lesion was 90% stenosed.  The estimated blood loss was <50 mL.  There was two vessel coronary artery disease.  Despite abnormal stress would recommend proceed with transplant evaluation.    Colonoscopy: 2/10/2021 repeat in 5 years tubular adenoma   PTH:  Lab Results   Component Value Date    .6 (H) 10/20/2022    CALCIUM 9.1 06/14/2022    PHOS 2.1 (L) 06/14/2022   PSA:  PSA, Screen (ng/mL)   Date Value   10/20/2022 1.5         Current Outpatient Medications:     amLODIPine (NORVASC) 10 MG tablet, Take 1 tablet (10 mg total) by mouth once daily., Disp: 30 tablet, Rfl: 3    aspirin (ECOTRIN) 81 MG EC tablet, Take 1 tablet (81 mg total) by mouth once daily., Disp: 30 tablet, Rfl: 11    AURYXIA 210 mg iron Tab, Take 420 mg by mouth 3 (three) times daily with meals. Take 1 with snacks, Disp: , Rfl:     carvediloL (COREG) 25 MG tablet, TAKE 1 TABLET BY MOUTH TWICE A DAY WITH FOOD (Patient taking differently: Take 25 mg by mouth once daily.), Disp: 180 tablet, Rfl: 2    cinacalcet (SENSIPAR) 90 MG Tab, Take 90 mg by mouth once daily., Disp: , Rfl:     losartan (COZAAR) 100 MG tablet, Take 100 mg by mouth once daily., Disp: , Rfl:   No current facility-administered medications for this visit.    Facility-Administered Medications Ordered in Other Visits:     ceFAZolin injection 2 g, 2 g, Intravenous, On Call Procedure, Michelle Spence MD    lidocaine (PF) 10 mg/ml (1%) injection 10 mg, 1 mL, Intradermal, Once, Michelle Spence MD      Past Medical History:   Diagnosis Date    CAD (coronary artery disease), native coronary artery 11/21/2019    Cataract     Diabetes mellitus     Diabetic retinopathy     Dialysis patient     DM type 2 causing renal disease, not at goal     ESRD (end stage renal disease) started dialysis 01/2014 6/5/2014    Hyperparathyroidism, secondary renal 6/5/2014    Hypertension     NSTEMI (non-ST elevated myocardial infarction) 12/21/2013    Organ transplant  candidate 6/5/2014    Pneumonia     Renal hypertension     Stroke        Past Surgical History:   Procedure Laterality Date    ANGIOGRAM, CORONARY, WITH LEFT HEART CATHETERIZATION N/A 7/1/2021    Procedure: Angiogram, Coronary, with Left Heart Cath;  Surgeon: Miki Zendejas MD;  Location: [facility] CATH LAB;  Service: Cardiology;  Laterality: N/A;    COLONOSCOPY N/A 5/3/2017    Procedure: COLONOSCOPY;  Surgeon: Rufino Carpenter MD;  Location: [facility] ENDO (4TH FLR);  Service: Endoscopy;  Laterality: N/A;  pt states can only schedule on Wednesdays    COLONOSCOPY N/A 2/9/2021    Procedure: COLONOSCOPY;  Surgeon: Edil Wong MD;  Location: [facility] ENDO (4TH FLR);  Service: Endoscopy;  Laterality: N/A;  Dialysis MWF/ labwork day of procedure  right arm aceess  per Dr. Colin pt can hold Plavix 5 days prior see note- sm  COVID test on 2/6/21 at [facility] - sm    COLONOSCOPY N/A 2/10/2021    Procedure: COLONOSCOPY;  Surgeon: Robert Ayers MD;  Location: [facility] ENDO (4TH FLR);  Service: Endoscopy;  Laterality: N/A;  rescheduled due to poor bowel prep-BB  negative covid screening 2/6/21-BB  dialysis M-W-F-BB  okay to r/s for 2/9/21 and to hold Plavix per Dr. KIRAN Wong-BB  labs same day-BB    CORONARY STENT PLACEMENT N/A 7/25/2019    Procedure: INSERTION, STENT, CORONARY ARTERY;  Surgeon: Miki Zendejas MD;  Location: [facility] CATH LAB;  Service: Cardiology;  Laterality: N/A;    DIALYSIS FISTULA CREATION      FISTULOGRAM N/A 2/18/2019    Procedure: Fistulogram;  Surgeon: Yaneth De La Cruz MD;  Location: [facility] CATH LAB;  Service: Cardiology;  Laterality: N/A;    FISTULOGRAM Right 7/23/2019    Procedure: Fistulogram;  Surgeon: Oz Cordoba MD;  Location: [facility] CATH LAB;  Service: Cardiology;  Laterality: Right;    LAPAROSCOPIC ROBOT-ASSISTED SURGICAL REMOVAL OF KIDNEY USING DA JAIME XI Right 5/19/2022    Procedure: XI ROBOTIC NEPHRECTOMY;  Surgeon: Aidan Hendricks MD;  [address]  Service: Urology;  Laterality: Right;  " 3hrs    LEFT HEART CATHETERIZATION Left 7/25/2019    Procedure: Left heart cath;  Surgeon: Miki Zendejas MD;  Location: [facility] CATH LAB;  Service: Cardiology;  Laterality: Left;    RETINAL LASER PROCEDURE Bilateral 2018 or 2017    Dr. Rothman    VASCULAR SURGERY         Review of Systems   Constitutional:  Negative for activity change, appetite change, chills, fatigue, fever and unexpected weight change.   HENT:  Negative for congestion, facial swelling, postnasal drip, rhinorrhea, sinus pressure, sore throat and trouble swallowing.    Eyes:  Negative for pain, redness and visual disturbance.   Respiratory:  Negative for cough, chest tightness, shortness of breath and wheezing.    Cardiovascular: Negative.  Negative for chest pain, palpitations and leg swelling.   Gastrointestinal:  Negative for abdominal pain, diarrhea, nausea and vomiting.   Genitourinary:  Negative for dysuria, flank pain and urgency.   Musculoskeletal:  Negative for gait problem, neck pain and neck stiffness.        Uses a cane    Skin:  Negative for rash.   Allergic/Immunologic: Negative for environmental allergies, food allergies and immunocompromised state.   Neurological:  Negative for dizziness, weakness, light-headedness and headaches.        Right sided weakness   Psychiatric/Behavioral:  Positive for sleep disturbance. Negative for agitation and confusion. The patient is not nervous/anxious.      Objective:   body mass index is 24.89 kg/m².  BP (!) 171/83 (BP Location: Left arm, Patient Position: Sitting, BP Method: Medium (Automatic))   Pulse 63   Temp 97.3 °F (36.3 °C) (Temporal)   Resp 16   Ht 5' 7.84" (1.723 m)   Wt 73.9 kg (162 lb 14.7 oz)   SpO2 100%   BMI 24.89 kg/m²     Physical Exam  Vitals reviewed.   Constitutional:       Appearance: Normal appearance. He is well-developed.   HENT:      Head: Normocephalic.      Mouth/Throat:      Pharynx: No oropharyngeal exudate.   Eyes:      General: No scleral icterus.     " Pupils: Pupils are equal, round, and reactive to light.   Cardiovascular:      Rate and Rhythm: Normal rate and regular rhythm.      Heart sounds: Normal heart sounds.   Pulmonary:      Effort: Pulmonary effort is normal.      Breath sounds: Normal breath sounds.   Abdominal:      General: Bowel sounds are normal. There is no distension.      Palpations: Abdomen is soft. There is no mass.      Tenderness: There is no abdominal tenderness.   Musculoskeletal:         General: Normal range of motion.        Arms:       Cervical back: Normal range of motion and neck supple.   Skin:     General: Skin is warm and dry.   Neurological:      Mental Status: He is alert and oriented to person, place, and time.      Motor: No abnormal muscle tone.      Coordination: Coordination normal.   Psychiatric:         Behavior: Behavior normal.       Labs:  Lab Results   Component Value Date    WBC 15.82 (H) 06/14/2022    HGB 8.9 (L) 06/14/2022    HCT 27.6 (L) 06/14/2022     (L) 06/14/2022    K 4.2 06/14/2022     06/14/2022    CO2 20 (L) 06/14/2022    BUN 29 (H) 06/14/2022    CREATININE 8.5 (H) 06/14/2022    EGFRNONAA 6.9 (A) 06/14/2022    CALCIUM 9.1 06/14/2022    PHOS 2.1 (L) 06/14/2022    MG 2.1 06/14/2022    ALBUMIN 2.8 (L) 06/14/2022    AST 11 06/14/2022    ALT 6 (L) 06/14/2022    UTPCR 3.2 (H) 12/15/2013    .6 (H) 10/20/2022       Lab Results   Component Value Date    BILIRUBINUA Negative 12/15/2013    PROTEINUA 3+ (A) 12/15/2013    NITRITE Negative 12/15/2013    RBCUA 2 12/15/2013    WBCUA 4 12/15/2013       No results found for: HLAABCTYPE    Lab Results   Component Value Date    CPRA 37 10/01/2022    ZW7KFMN Negative 02/03/2021    CIABCLM B75 10/01/2022    CIIAB DQ2 10/01/2022    ABCMT DQB1*06:03, DR51 10/01/2022       Labs were reviewed with the patient.    Pre-transplant Workup:   Reviewed with the patient.    Assessment:     1. Patient on waiting list for kidney transplant    2. Chronic kidney  disease-mineral and bone disorder    3. Renal hypertension    4. Coronary artery disease involving native coronary artery of native heart without angina pectoris    5. Mixed hyperlipidemia    6. Controlled type 2 diabetes mellitus with chronic kidney disease on chronic dialysis, without long-term current use of insulin        Plan:   Needs Clearance from urology post right nephrectomy for RCC. Needs lesions on left kidney. Needs to be seen in clinic per Dr. Hendricks----I gave patient phone number that was listed in the chart for him to call and make an appointment.       Transplant Candidacy:   Mr. Dickinson is an unacceptable kidney transplant candidate.  Meets center eligibility for accepting HCV+ donor offer - yes.  Patient educated on HCV+ donors. Haroldo is willing  to accept HCV+ donor offer -  yes   Patient is a candidate for KDPI > 85 kidney donor offer - no ON PLAVIX and weight.  He will  REMAIN  inactive status at present due to  Waiting on Urology clearance post right nephrectomy for RCC.     Michelle Jeter NP       Follow-up:   In addition to the tests noted in the plan, Mr. Dickinson will continue to have reevaluation as per the standing pre-kidney transplant protocol:  Monthly blood for PRA  Annual return to clinic, except HIV positive, > 65 years of age, or pancreas transplant candidates who will be scheduled to see transplant every 6 months while in pre-transplant phase  Annual re-testing: CXR, EKG, yearly mammograms for women over 40 and PSA for males over 40, cardiology follow-up as recommended by initial cardiology pre-transplant evaluation  Renal ultrasound every 2 years  Baseline colonoscopy after age 50 and repeated as recommended    UNOS Patient Status  Functional Status: 60% - Requires occasional assistance but is able to care for needs  Physical Capacity: No Limitations

## 2022-10-20 NOTE — PATIENT INSTRUCTIONS
Urology 's number, [phone], to call to schedule for an appointment. This is needed for kidney transplant clearance.

## 2022-10-20 NOTE — LETTER
October 24, 2022        Otis Bryant  [address]  [place] [address]  Phone: [phone]  Fax: [phone]             [facility] Transplant 1st Fl  [address]  [facility] [address]  Phone: [phone]   Patient: Haroldo Dickinson Sr.   MR Number: [id number]   YOB: 1975   Date of Visit: 10/20/2022       Dear Dr. Otis Bryant    Thank you for referring Haroldo Dickinson to me for evaluation. Attached you will find relevant portions of my assessment and plan of care.    If you have questions, please do not hesitate to call me. I look forward to following Haroldo Dickinson along with you.    Sincerely,    Michelle Jeter NP    Enclosure    If you would like to receive this communication electronically, please contact [email or web address] or [phone] to request [employer] Link access.    [employer] Link is a tool which provides read-only access to select patient information with whom you have a relationship. Its easy to use and provides real time access to review your patients record including encounter summaries, notes, results, and demographic information.    If you feel you have received this communication in error or would no longer like to receive these types of communications, please e-mail [email or web address]

## 2022-10-20 NOTE — PROGRESS NOTES
INITIAL PATIENT EDUCATION NOTE    Mr. Haroldo Dickinson Sr. was seen in pre-kidney transplant clinic for evaluation for kidney, kidney/pancreas or pancreas only transplant.  The patient attended a an individual video education session that discussed/reviewed the following aspects of transplantation: evaluation and selection committee process, UNOS waitlist management/multiple listings, types of organs offered (KDPI < 85%, KDPI > 85%, PHS risk, DCD, HCV+, HIV+ for HIV+ recipients and enbloc/dual), financial aspects, surgical procedures, dietary instruction pre- and post-transplant, health maintenance pre- and post-transplant, post-transplant hospitalization and outpatient follow-up, potential to participate in a research protocol, and medication management and side effects.  A question and answer session was provided after the presentation.    The patient was seen by all members of the multi-disciplinary team to include: Nephrologist/PA, Surgeon, , Transplant Coordinator, , Pharmacist and Dietician (if applicable).    The patient reviewed and signed all consents for evaluation which were witnessed and sent to scanning into the [facility] chart.    The patient was given an education book and plan for further evaluation based on his individual assessment.      Reviewed program requirement for complete COVID vaccination with documentation prior to listing.  COVID education information reviewed with patient. Patient encouraged to be up to date on all vaccinations.       The patient was informed that the transplant team would manage immediate post op pain. If the patient requires long term pain management, they will need to have that pain management addressed by their PCP or previous provider who wrote for long term pain medicines.    The patient was encouraged to call with any questions or concerns.

## 2022-10-21 ENCOUNTER — TELEPHONE (OUTPATIENT)
Dept: TRANSPLANT | Facility: CLINIC | Age: 47
End: 2022-10-21
Payer: MEDICARE

## 2022-10-21 NOTE — TELEPHONE ENCOUNTER
Spoke to patient, provided phone number for him to schedule his Urology follow up. States he got the number yesterday from the NP in clinic. Patient states he never got voicemail from Urology to call them to schedule in August. We will need him to follow up to consider reactivation on the wait list. I also spoke with patient about our concerns with his caregivers. He is currently living alone but tells me he is going to move back with his grandmother where his sister will be able to care for him. We spoke about the frequent follow up post transplant and the importance of having caregivers to help him. He says his sister is committed and has the vacation time to take from work to care for him post transplant.

## 2022-10-21 NOTE — PROGRESS NOTES
"Transplant Psychosocial Evaluation Update:  Last psychosocial evaluation for transplant was completed on 10/12/2021 by ADEEL Hdz.    Pt has history of CVA with cognitive deficits and 4th grade reading level. All information should be presented in short, simple sentences. Frequent use of teach-back to ensure understanding is strongly encouraged.    Pt presents today in company of pt's sister, Ariadne Dickinson. Pt and caregiver  present as alert, oriented to person, place, time, purpose of visit. Pt and caregiver deny concerns about going through with transplant and state motivation, psychosocial risk factors, medical risk factors, financial aspects, and lifetime commitments. SW asked pt and caregiver where pt plans to discharge after transplant and pt's caregiver stated, "we've never thought about it." SW reminded pt and caregiver pt would require 24/7 caregiver support posttransplant with caregiver staying with pt at pt's home or pt staying with pt's sister at her house. SW strongly encouraged pt and pt's sister to secure plan for where pt will recover posttransplant and to call transplant SW team with any updates. All concerns and education points as per transplant psychosocial evaluation process addressed (also refer to psychosocial dated 10/12/2021). Pt actively participated in today's interview, with pt's sister participating as caregiver. Pt asking questions appropriately and denies changes to previous psychosocial evaluation for transplantation which we reviewed line by line with pt and, per pt choice, with pts caregiver today.     Primary Caregivers and Transportation for Transplant:  1. Ariadne Dickinson, 41, sister, lives in [facility] [place], works FT as lobby aid in a hotel, has PTO, DOES drive, ([phone])  2. Ginger Conti, 62, aunt, retired, lives in [facility] [place], told previous SW Wilder BECK LMSW that aunt does NOT drive and told WILIAN today aunt DOES drive; ([phone])      Both pt and caregiver/s plan to stay locally at " pt's house or pt's sister's house. Pt and pt's sister reported not having plan for where pt would recover after transplant. Pt has presented with numerous different caregivers throughout the years. SW strongly encouraged pt to follow-up with transplant SW team regarding secure housing plan posttransplant.  Caregivers plan to stay at hospital as appropriate for transplant and post-transplant process.    Pts Vocation: Pt disabled and reports previously working as an [employer] . Last day worked:  2013. Disability began in 2013 due to CVA.    DME: cane and/or walker for ambulation.     ADLS:  Pt reports not driving and difficulty with walking- uses walker or cane for ambulation. Pt reports difficulty with preparing meals, cleaning, and bathing. Pt states pt's sister, Ariadne Dickinson, comes to pt's house and assist pt when available. SW asked pt who was available to assist pt when pt's sister was unavailable and pt reported there was no one else available to assist pt with activities of daily living.     Household and Dependents: pt resides alone and has no dependents at this time. Pt has an adult son that lives with his mother in [place] [place].     Income: Pt states ability to afford all monthly expenses and costs including for medications now and if transplanted based on income and on savings and assets. Pt reports family assist with expenses when needed.     Dialysis Adherence:  Pt reports is highly compliant with all medical appointments and instructions. Dialysis compliance form fowler been faxed to [facility] and SW awaits reply. WILIAN left message for DUSW and awaits reply. Pt reports using Medicaid transportation to get to and from dialysis appointments and Medicaid transportation frequently not showing up. Pt reports DUSW working with pt to obtain Lyft rides when Medicaid transportation does not show up.     Pt and sister deny any additional concerns, stating preparedness and motivation to proceed with organ transplant.      Payer/Plan Subscr  Sex Relation Sub. Ins. ID Effective Group Num   1. [employer] MANAGE* TOMMIE VARGAS S* 1975 Male Self [id number]                                   [address] [address]         Suitability for Transplant:   Pt remains moderate to high risk for transplant at this time based on lack of stable transportation, pt and sister not having plan on where pt will stay during posttansplant recovery period. Pt's only confirmed caregiver is sister. Pt also higher risk due to post CVA status resulting in cognitive/physical deficits requiring increased need for caregiver involvement.     SW recommends pt establish plan for where pt will recover post surgery, confirm backup caregiver plan, and fundraise to offset transplant related expenses.     SW recommends caregiver and backup caregiver plan be confirmed prior to pt being reactivated on transplant waitlist. SW recommends pt be present for all future appointments.     WILIAN addressed suitability and recommendations with pt's coordination, PORTIA Mcintyre.  Ca Toussaint LMSW

## 2022-10-27 ENCOUNTER — TELEPHONE (OUTPATIENT)
Dept: UROLOGY | Facility: CLINIC | Age: 47
End: 2022-10-27
Payer: MEDICARE

## 2022-10-27 NOTE — TELEPHONE ENCOUNTER
1450-I spoke to pt now and scheduled Eladio OV per below.  Appt slip in mail.  No Portal     11/8/2022 Status: [facility]   Appt Time: 2:30 PM       ----- Message from Dyana Nolan sent at 10/27/2022 10:10 AM CDT -----  Contact: @[phone]  Pt is calling in to get a follow up appt, please call to discuss further.

## 2022-11-02 ENCOUNTER — TELEPHONE (OUTPATIENT)
Dept: UROLOGY | Facility: CLINIC | Age: 47
End: 2022-11-02
Payer: MEDICARE

## 2022-11-02 NOTE — TELEPHONE ENCOUNTER
I spoke to pt now and rescheduled both MRI Abd and [facility] OV for Tue 11/22/22.  Pt requests appt slip in mail to arrange transportation -in mail now.    booking out of clinic for a surgery on the afternoon of 11/8/22.  spoke to patient who is agreeable to move his appt.  He needs an MRI w/o contrast (ordered) scheduled and then f/u sometime after that to review the results.  had a recent US with left renal lesions that need better imaging.

## 2022-11-22 ENCOUNTER — HOSPITAL ENCOUNTER (OUTPATIENT)
Dept: RADIOLOGY | Facility: HOSPITAL | Age: 47
Discharge: HOME | End: 2022-11-22
Attending: UROLOGY
Payer: MEDICARE

## 2022-11-22 ENCOUNTER — OFFICE VISIT (OUTPATIENT)
Dept: UROLOGY | Facility: CLINIC | Age: 47
End: 2022-11-22
Payer: MEDICARE

## 2022-11-22 ENCOUNTER — TELEPHONE (OUTPATIENT)
Dept: UROLOGY | Facility: CLINIC | Age: 47
End: 2022-11-22

## 2022-11-22 DIAGNOSIS — N28.89 RENAL MASS: ICD-10-CM

## 2022-11-22 DIAGNOSIS — N18.6 END STAGE RENAL FAILURE ON DIALYSIS: ICD-10-CM

## 2022-11-22 DIAGNOSIS — N28.89 RENAL MASS: Primary | ICD-10-CM

## 2022-11-22 DIAGNOSIS — Z99.2 END STAGE RENAL FAILURE ON DIALYSIS: ICD-10-CM

## 2022-11-22 DIAGNOSIS — C64.1 RENAL CELL CARCINOMA OF RIGHT KIDNEY: ICD-10-CM

## 2022-11-22 PROCEDURE — 99214 OFFICE O/P EST MOD 30 MIN: CPT | Mod: S$GLB,TXP,, | Performed by: UROLOGY

## 2022-11-22 PROCEDURE — 4010F ACE/ARB THERAPY RXD/TAKEN: CPT | Mod: CPTII,S$GLB,TXP, | Performed by: UROLOGY

## 2022-11-22 PROCEDURE — 3066F PR DOCUMENTATION OF TREATMENT FOR NEPHROPATHY: ICD-10-PCS | Mod: CPTII,S$GLB,TXP, | Performed by: UROLOGY

## 2022-11-22 PROCEDURE — 1159F PR MEDICATION LIST DOCUMENTED IN MEDICAL RECORD: ICD-10-PCS | Mod: CPTII,S$GLB,TXP, | Performed by: UROLOGY

## 2022-11-22 PROCEDURE — 99214 PR OFFICE/OUTPT VISIT, EST, LEVL IV, 30-39 MIN: ICD-10-PCS | Mod: S$GLB,TXP,, | Performed by: UROLOGY

## 2022-11-22 PROCEDURE — 74181 MRI ABDOMEN W/O CONTRAST: CPT | Mod: 26,TXP,, | Performed by: RADIOLOGY

## 2022-11-22 PROCEDURE — 1160F RVW MEDS BY RX/DR IN RCRD: CPT | Mod: CPTII,S$GLB,TXP, | Performed by: UROLOGY

## 2022-11-22 PROCEDURE — 3066F NEPHROPATHY DOC TX: CPT | Mod: CPTII,S$GLB,TXP, | Performed by: UROLOGY

## 2022-11-22 PROCEDURE — 4010F PR ACE/ARB THEARPY RXD/TAKEN: ICD-10-PCS | Mod: CPTII,S$GLB,TXP, | Performed by: UROLOGY

## 2022-11-22 PROCEDURE — 1160F PR REVIEW ALL MEDS BY PRESCRIBER/CLIN PHARMACIST DOCUMENTED: ICD-10-PCS | Mod: CPTII,S$GLB,TXP, | Performed by: UROLOGY

## 2022-11-22 PROCEDURE — 74181 MRI ABDOMEN W/O CONTRAST: CPT | Mod: TC,TXP

## 2022-11-22 PROCEDURE — 99999 PR PBB SHADOW E&M-EST. PATIENT-LVL I: ICD-10-PCS | Mod: PBBFAC,TXP,, | Performed by: UROLOGY

## 2022-11-22 PROCEDURE — 74181 MRI ABDOMEN WITHOUT CONTRAST: ICD-10-PCS | Mod: 26,TXP,, | Performed by: RADIOLOGY

## 2022-11-22 PROCEDURE — 99999 PR PBB SHADOW E&M-EST. PATIENT-LVL I: CPT | Mod: PBBFAC,TXP,, | Performed by: UROLOGY

## 2022-11-22 PROCEDURE — 3044F HG A1C LEVEL LT 7.0%: CPT | Mod: CPTII,S$GLB,TXP, | Performed by: UROLOGY

## 2022-11-22 PROCEDURE — 3044F PR MOST RECENT HEMOGLOBIN A1C LEVEL <7.0%: ICD-10-PCS | Mod: CPTII,S$GLB,TXP, | Performed by: UROLOGY

## 2022-11-22 PROCEDURE — 1159F MED LIST DOCD IN RCRD: CPT | Mod: CPTII,S$GLB,TXP, | Performed by: UROLOGY

## 2022-11-22 NOTE — PROGRESS NOTES
Subjective:       Patient ID: Haroldo Dickinson Sr. is a 47 y.o. male.    Chief Complaint: S/p right nephrectomy      History of Present Illness  HPI  Patient is a 47 y.o. male who is established to our clinic and referred by their transplant team, Michelle Jeter NP for evaluation of renal cysts. S/p right robotic nephrectomy on 5/19/22. Path resulting T1a.   Patient has ESRD 2/2 HTN.  Does HD, MWF via a right forearm AVF.  He does not urinate at all.   No FH of kidney cancer.      He had a CVA in 2013, reportedly due to uncontrolled high BP.   BP still not under good control. Denies prior abdominal surgery. No blood thinners.       Review of Systems  All other systems reviewed and negative except pertinent positives noted in HPI.       Objective:     Physical Exam  Constitutional:       General: He is not in acute distress.     Appearance: He is well-developed.   HENT:      Head: Normocephalic and atraumatic.   Eyes:      General: No scleral icterus.  Neck:      Trachea: No tracheal deviation.   Pulmonary:      Effort: Pulmonary effort is normal. No respiratory distress.   Abdominal:      Tenderness: There is no right CVA tenderness or left CVA tenderness.      Comments: Abdominal incisions healing well   Neurological:      Mental Status: He is alert and oriented to person, place, and time.   Psychiatric:         Behavior: Behavior normal.         Thought Content: Thought content normal.         Judgment: Judgment normal.       Lab Review  Lab Results   Component Value Date    COLORU Yellow 12/15/2013    SPECGRAV 1.010 12/15/2013    PHUR 6.0 12/15/2013    NITRITE Negative 12/15/2013    KETONESU Negative 12/15/2013    UROBILINOGEN Negative 12/15/2013         Assessment:        1. Renal mass              Plan:     Renal mass    -continue HD  -MRI individually reviewed and interpreted with patient today. No evidence of residual disease s/p right nephrectomy. 2 indeterminate left renal lesions. 4 cm and 3 cm.     - Long  talk about renal mass and it's management.  Reviewed images.Discussed options including active surveillance, biopsy, minimally invasive techniques including HFRA, cryo. Discussed open and laparascopic surgical approaches. Discussed partial and radical nephrectomy. Discussed surgery preparation, surgery, recuperation, recovery, exercise restrictions. Discussed risks, benefits, and complications. Answered questions and addressed their concerns.    -given the known prior malignancy in his right kidney coupled with solid renal masses in the left kidney, presumably this could be concerning for renal cell carcinoma and likely is renal cell carcinoma.  Therefore, we recommend treatment.    --I have explained the risk, benefits, and alternatives of the procedure in detail.  The risks including but not limited to bleeding, injury to adjacent structures including the spleen, liver, lung, pancreas, colon and intestines, blood vessels in the abdomen including the renal artery or renal vein, or need for conversion to open nephrectomy were explained to the patient in depth. The patient voices understanding and all questions have been answered. The patient agrees to proceed as planned with a left robotic nephrectomy        I have reviewed and concur with the resident's history, physical, assessment, and plan.  I have personally interviewed and examined the patient at bedside.  See below addendum for my evaluation and additional findings.

## 2022-11-22 NOTE — TELEPHONE ENCOUNTER
Eladio surgery on Thu Jan 5, 2023 - left neph  I reviewed all pre-op and post -op appts with pt now.  Pt VU.  Appt slips in mail now.  Thank you.

## 2022-11-30 ENCOUNTER — HOSPITAL ENCOUNTER (OUTPATIENT)
Facility: HOSPITAL | Age: 47
Discharge: HOME | End: 2022-12-01
Attending: EMERGENCY MEDICINE | Admitting: INTERNAL MEDICINE
Payer: MEDICARE

## 2022-11-30 DIAGNOSIS — N28.89 RENAL MASS: ICD-10-CM

## 2022-11-30 DIAGNOSIS — I10 ESSENTIAL HYPERTENSION: ICD-10-CM

## 2022-11-30 DIAGNOSIS — E87.5 HYPERKALEMIA: Primary | ICD-10-CM

## 2022-11-30 DIAGNOSIS — D64.9 ANEMIA, UNSPECIFIED TYPE: ICD-10-CM

## 2022-11-30 DIAGNOSIS — R10.9 FLANK PAIN: ICD-10-CM

## 2022-11-30 DIAGNOSIS — N28.1 RENAL CYST: ICD-10-CM

## 2022-11-30 DIAGNOSIS — N18.6 ESRD (END STAGE RENAL DISEASE): ICD-10-CM

## 2022-11-30 LAB
ALBUMIN SERPL BCP-MCNC: 3.2 G/DL (ref 3.5–5.2)
ALP SERPL-CCNC: 76 U/L (ref 55–135)
ALT SERPL W/O P-5'-P-CCNC: 18 U/L (ref 10–44)
ANION GAP SERPL CALC-SCNC: 14 MMOL/L (ref 8–16)
AST SERPL-CCNC: 12 U/L (ref 10–40)
BASOPHILS # BLD AUTO: 0.1 K/UL (ref 0–0.2)
BASOPHILS NFR BLD: 0.9 % (ref 0–1.9)
BILIRUB SERPL-MCNC: 1.1 MG/DL (ref 0.1–1)
BUN SERPL-MCNC: 64 MG/DL (ref 6–30)
BUN SERPL-MCNC: 67 MG/DL (ref 6–20)
CALCIUM SERPL-MCNC: 9.6 MG/DL (ref 8.7–10.5)
CHLORIDE SERPL-SCNC: 101 MMOL/L (ref 95–110)
CHLORIDE SERPL-SCNC: 104 MMOL/L (ref 95–110)
CO2 SERPL-SCNC: 22 MMOL/L (ref 23–29)
CREAT SERPL-MCNC: 13.5 MG/DL (ref 0.5–1.4)
CREAT SERPL-MCNC: 15 MG/DL (ref 0.5–1.4)
DIFFERENTIAL METHOD: ABNORMAL
EOSINOPHIL # BLD AUTO: 0.2 K/UL (ref 0–0.5)
EOSINOPHIL NFR BLD: 1.9 % (ref 0–8)
ERYTHROCYTE [DISTWIDTH] IN BLOOD BY AUTOMATED COUNT: 15 % (ref 11.5–14.5)
EST. GFR  (NO RACE VARIABLE): 4.2 ML/MIN/1.73 M^2
GLUCOSE SERPL-MCNC: 84 MG/DL (ref 70–110)
GLUCOSE SERPL-MCNC: 85 MG/DL (ref 70–110)
HCT VFR BLD AUTO: 36.1 % (ref 40–54)
HCT VFR BLD CALC: 38 %PCV (ref 36–54)
HGB BLD-MCNC: 11.7 G/DL (ref 14–18)
IMM GRANULOCYTES # BLD AUTO: 0.04 K/UL (ref 0–0.04)
IMM GRANULOCYTES NFR BLD AUTO: 0.4 % (ref 0–0.5)
LYMPHOCYTES # BLD AUTO: 1.1 K/UL (ref 1–4.8)
LYMPHOCYTES NFR BLD: 10.3 % (ref 18–48)
MAGNESIUM SERPL-MCNC: 1.9 MG/DL (ref 1.6–2.6)
MCH RBC QN AUTO: 31.9 PG (ref 27–31)
MCHC RBC AUTO-ENTMCNC: 32.4 G/DL (ref 32–36)
MCV RBC AUTO: 98 FL (ref 82–98)
MONOCYTES # BLD AUTO: 1.1 K/UL (ref 0.3–1)
MONOCYTES NFR BLD: 10.1 % (ref 4–15)
NEUTROPHILS # BLD AUTO: 8.1 K/UL (ref 1.8–7.7)
NEUTROPHILS NFR BLD: 76.4 % (ref 38–73)
NRBC BLD-RTO: 0 /100 WBC
PLATELET # BLD AUTO: 271 K/UL (ref 150–450)
PMV BLD AUTO: 10.5 FL (ref 9.2–12.9)
POC IONIZED CALCIUM: 1.2 MMOL/L (ref 1.06–1.42)
POC TCO2 (MEASURED): 23 MMOL/L (ref 23–29)
POCT GLUCOSE: 78 MG/DL (ref 70–110)
POTASSIUM BLD-SCNC: 6 MMOL/L (ref 3.5–5.1)
POTASSIUM SERPL-SCNC: 6.1 MMOL/L (ref 3.5–5.1)
PROT SERPL-MCNC: 7.7 G/DL (ref 6–8.4)
RBC # BLD AUTO: 3.67 M/UL (ref 4.6–6.2)
SAMPLE: ABNORMAL
SODIUM BLD-SCNC: 136 MMOL/L (ref 136–145)
SODIUM SERPL-SCNC: 137 MMOL/L (ref 136–145)
WBC # BLD AUTO: 10.63 K/UL (ref 3.9–12.7)

## 2022-11-30 PROCEDURE — 94761 N-INVAS EAR/PLS OXIMETRY MLT: CPT | Mod: NTX

## 2022-11-30 PROCEDURE — 85025 COMPLETE CBC W/AUTO DIFF WBC: CPT | Mod: NTX | Performed by: PHYSICIAN ASSISTANT

## 2022-11-30 PROCEDURE — 99220 PR INITIAL OBSERVATION CARE,LEVL III: ICD-10-PCS | Mod: NTX,,, | Performed by: INTERNAL MEDICINE

## 2022-11-30 PROCEDURE — G0378 HOSPITAL OBSERVATION PER HR: HCPCS | Mod: NTX

## 2022-11-30 PROCEDURE — 80047 BASIC METABLC PNL IONIZED CA: CPT | Mod: NTX

## 2022-11-30 PROCEDURE — 94640 AIRWAY INHALATION TREATMENT: CPT | Mod: NTX

## 2022-11-30 PROCEDURE — 99214 OFFICE O/P EST MOD 30 MIN: CPT | Mod: NTX,,, | Performed by: NURSE PRACTITIONER

## 2022-11-30 PROCEDURE — 80100016 HC MAINTENANCE HEMODIALYSIS: Mod: NTX

## 2022-11-30 PROCEDURE — 96374 THER/PROPH/DIAG INJ IV PUSH: CPT | Mod: 59,NTX

## 2022-11-30 PROCEDURE — 99291 PR CRITICAL CARE, E/M 30-74 MINUTES: ICD-10-PCS | Mod: ,,, | Performed by: EMERGENCY MEDICINE

## 2022-11-30 PROCEDURE — 25000003 PHARM REV CODE 250: Mod: NTX | Performed by: INTERNAL MEDICINE

## 2022-11-30 PROCEDURE — 82330 ASSAY OF CALCIUM: CPT | Mod: NTX

## 2022-11-30 PROCEDURE — 99214 PR OFFICE/OUTPT VISIT, EST, LEVL IV, 30-39 MIN: ICD-10-PCS | Mod: NTX,,, | Performed by: NURSE PRACTITIONER

## 2022-11-30 PROCEDURE — 25000242 PHARM REV CODE 250 ALT 637 W/ HCPCS: Mod: NTX | Performed by: PHYSICIAN ASSISTANT

## 2022-11-30 PROCEDURE — 99285 EMERGENCY DEPT VISIT HI MDM: CPT | Mod: 25,NTX

## 2022-11-30 PROCEDURE — 93010 ELECTROCARDIOGRAM REPORT: CPT | Mod: NTX,,, | Performed by: INTERNAL MEDICINE

## 2022-11-30 PROCEDURE — 25000003 PHARM REV CODE 250: Mod: NTX | Performed by: PHYSICIAN ASSISTANT

## 2022-11-30 PROCEDURE — 93005 ELECTROCARDIOGRAM TRACING: CPT | Mod: NTX

## 2022-11-30 PROCEDURE — 99291 CRITICAL CARE FIRST HOUR: CPT | Mod: ,,, | Performed by: EMERGENCY MEDICINE

## 2022-11-30 PROCEDURE — 82962 GLUCOSE BLOOD TEST: CPT | Mod: 91,NTX

## 2022-11-30 PROCEDURE — 93010 EKG 12-LEAD: ICD-10-PCS | Mod: NTX,,, | Performed by: INTERNAL MEDICINE

## 2022-11-30 PROCEDURE — 96365 THER/PROPH/DIAG IV INF INIT: CPT | Mod: NTX

## 2022-11-30 PROCEDURE — 83735 ASSAY OF MAGNESIUM: CPT | Mod: NTX | Performed by: PHYSICIAN ASSISTANT

## 2022-11-30 PROCEDURE — 80053 COMPREHEN METABOLIC PANEL: CPT | Mod: NTX | Performed by: PHYSICIAN ASSISTANT

## 2022-11-30 PROCEDURE — 63600175 PHARM REV CODE 636 W HCPCS: Mod: NTX | Performed by: PHYSICIAN ASSISTANT

## 2022-11-30 PROCEDURE — 96361 HYDRATE IV INFUSION ADD-ON: CPT | Mod: NTX

## 2022-11-30 PROCEDURE — 99220 PR INITIAL OBSERVATION CARE,LEVL III: CPT | Mod: NTX,,, | Performed by: INTERNAL MEDICINE

## 2022-11-30 PROCEDURE — 96375 TX/PRO/DX INJ NEW DRUG ADDON: CPT | Mod: 59,NTX

## 2022-11-30 PROCEDURE — G0257 UNSCHED DIALYSIS ESRD PT HOS: HCPCS | Mod: NTX

## 2022-11-30 RX ORDER — CALCIUM GLUCONATE 20 MG/ML
1 INJECTION, SOLUTION INTRAVENOUS
Status: COMPLETED | OUTPATIENT
Start: 2022-11-30 | End: 2022-11-30

## 2022-11-30 RX ORDER — AMLODIPINE BESYLATE 10 MG/1
10 TABLET ORAL DAILY
Status: DISCONTINUED | OUTPATIENT
Start: 2022-11-30 | End: 2022-12-01 | Stop reason: HOSPADM

## 2022-11-30 RX ORDER — CARVEDILOL 12.5 MG/1
25 TABLET ORAL DAILY
Status: DISCONTINUED | OUTPATIENT
Start: 2022-11-30 | End: 2022-12-01 | Stop reason: HOSPADM

## 2022-11-30 RX ORDER — LOSARTAN POTASSIUM 50 MG/1
100 TABLET ORAL DAILY
Status: DISCONTINUED | OUTPATIENT
Start: 2022-11-30 | End: 2022-11-30

## 2022-11-30 RX ORDER — MORPHINE SULFATE 4 MG/ML
4 INJECTION, SOLUTION INTRAMUSCULAR; INTRAVENOUS
Status: COMPLETED | OUTPATIENT
Start: 2022-11-30 | End: 2022-11-30

## 2022-11-30 RX ORDER — ALBUTEROL SULFATE 2.5 MG/.5ML
10 SOLUTION RESPIRATORY (INHALATION)
Status: COMPLETED | OUTPATIENT
Start: 2022-11-30 | End: 2022-11-30

## 2022-11-30 RX ORDER — ASPIRIN 81 MG/1
81 TABLET ORAL DAILY
Status: DISCONTINUED | OUTPATIENT
Start: 2022-11-30 | End: 2022-12-01 | Stop reason: HOSPADM

## 2022-11-30 RX ORDER — ACETAMINOPHEN 325 MG/1
650 TABLET ORAL
Status: COMPLETED | OUTPATIENT
Start: 2022-11-30 | End: 2022-11-30

## 2022-11-30 RX ORDER — SODIUM CHLORIDE 9 MG/ML
INJECTION, SOLUTION INTRAVENOUS ONCE
Status: COMPLETED | OUTPATIENT
Start: 2022-11-30 | End: 2022-11-30

## 2022-11-30 RX ORDER — CINACALCET 30 MG/1
90 TABLET, FILM COATED ORAL DAILY
Status: DISCONTINUED | OUTPATIENT
Start: 2022-11-30 | End: 2022-12-01 | Stop reason: HOSPADM

## 2022-11-30 RX ADMIN — ACETAMINOPHEN 650 MG: 325 TABLET ORAL at 10:11

## 2022-11-30 RX ADMIN — DEXTROSE 250 ML: 10 SOLUTION INTRAVENOUS at 01:11

## 2022-11-30 RX ADMIN — SODIUM CHLORIDE: 0.9 INJECTION, SOLUTION INTRAVENOUS at 02:11

## 2022-11-30 RX ADMIN — SODIUM ZIRCONIUM CYCLOSILICATE 10 G: 10 POWDER, FOR SUSPENSION ORAL at 01:11

## 2022-11-30 RX ADMIN — AMLODIPINE BESYLATE 10 MG: 10 TABLET ORAL at 06:11

## 2022-11-30 RX ADMIN — ASPIRIN 81 MG: 81 TABLET, COATED ORAL at 06:11

## 2022-11-30 RX ADMIN — ALBUTEROL SULFATE 10 MG: 2.5 SOLUTION RESPIRATORY (INHALATION) at 10:11

## 2022-11-30 RX ADMIN — CINACALCET 90 MG: 90 TABLET, FILM COATED ORAL at 08:11

## 2022-11-30 RX ADMIN — CARVEDILOL 25 MG: 12.5 TABLET, FILM COATED ORAL at 06:11

## 2022-11-30 RX ADMIN — INSULIN HUMAN 5 UNITS: 100 INJECTION, SOLUTION PARENTERAL at 01:11

## 2022-11-30 RX ADMIN — MORPHINE SULFATE 4 MG: 4 INJECTION INTRAVENOUS at 10:11

## 2022-11-30 RX ADMIN — CALCIUM GLUCONATE 1 G: 20 INJECTION, SOLUTION INTRAVENOUS at 01:11

## 2022-11-30 NOTE — SUBJECTIVE & OBJECTIVE
Past Medical History:   Diagnosis Date    CAD (coronary artery disease), native coronary artery 11/21/2019    Cataract     Diabetes mellitus     Diabetic retinopathy     Dialysis patient     DM type 2 causing renal disease, not at goal     ESRD (end stage renal disease) started dialysis 01/2014 6/5/2014    Hyperparathyroidism, secondary renal 6/5/2014    Hypertension     NSTEMI (non-ST elevated myocardial infarction) 12/21/2013    Organ transplant candidate 6/5/2014    Pneumonia     Renal hypertension     Stroke        Past Surgical History:   Procedure Laterality Date    ANGIOGRAM, CORONARY, WITH LEFT HEART CATHETERIZATION N/A 7/1/2021    Procedure: Angiogram, Coronary, with Left Heart Cath;  Surgeon: Miki Zendejas MD;  Location: [facility] CATH LAB;  Service: Cardiology;  Laterality: N/A;    COLONOSCOPY N/A 5/3/2017    Procedure: COLONOSCOPY;  Surgeon: Rufino Carpenter MD;  [facility]  Service: Endoscopy;  Laterality: N/A;  pt states can only schedule on Wednesdays    COLONOSCOPY N/A 2/9/2021    Procedure: COLONOSCOPY;  Surgeon: Edil Wong MD;  [facility]  Service: Endoscopy;  Laterality: N/A;  Dialysis MWF/ labwork day of procedure  right arm aceess  per Dr. Colin pt can hold Plavix 5 days prior see note- sm  COVID test on 2/6/21 at [facility] - sm    COLONOSCOPY N/A 2/10/2021    Procedure: COLONOSCOPY;  Surgeon: Robert Ayers MD;  Location: [facility]  Service: Endoscopy;  Laterality: N/A;  rescheduled due to poor bowel prep-BB  negative covid screening 2/6/21-BB  dialysis M-W-F-BB  okay to r/s for 2/9/21 and to hold Plavix per Dr. KIRAN Wong-BB  labs same day-BB    CORONARY STENT PLACEMENT N/A 7/25/2019    Procedure: INSERTION, STENT, CORONARY ARTERY;  Surgeon: Miki Zendejas MD;  Location: [facility] CATH LAB;  Service: Cardiology;  Laterality: N/A;    DIALYSIS FISTULA CREATION      FISTULOGRAM N/A 2/18/2019    Procedure: Fistulogram;  Surgeon: Yaneth De La Cruz MD;   Location: [facility] CATH LAB;  Service: Cardiology;  Laterality: N/A;    FISTULOGRAM Right 7/23/2019    Procedure: Fistulogram;  Surgeon: Oz Cordoba MD;  Location: [facility] CATH LAB;  Service: Cardiology;  Laterality: Right;    LAPAROSCOPIC ROBOT-ASSISTED SURGICAL REMOVAL OF KIDNEY USING DA JAIME XI Right 5/19/2022    Procedure: XI ROBOTIC NEPHRECTOMY;  Surgeon: Aidan Hendricks MD;  Location: [facility] FLR;  Service: Urology;  Laterality: Right;  3hrs    LEFT HEART CATHETERIZATION Left 7/25/2019    Procedure: Left heart cath;  Surgeon: Miki Zendejas MD;  Location: [facility] CATH LAB;  Service: Cardiology;  Laterality: Left;    RETINAL LASER PROCEDURE Bilateral 2018 or 2017    Dr. Rothman    VASCULAR SURGERY         Review of patient's allergies indicates:   Allergen Reactions    No known allergies      Current Facility-Administered Medications   Medication Frequency    0.9%  NaCl infusion Once    amLODIPine tablet 10 mg Daily    aspirin EC tablet 81 mg Daily    carvediloL tablet 25 mg Daily    cinacalcet tablet 90 mg Daily     Current Outpatient Medications   Medication    amLODIPine (NORVASC) 10 MG tablet    aspirin (ECOTRIN) 81 MG EC tablet    carvediloL (COREG) 25 MG tablet    cinacalcet (SENSIPAR) 90 MG Tab    losartan (COZAAR) 100 MG tablet    AURYXIA 210 mg iron Tab     Facility-Administered Medications Ordered in Other Encounters   Medication Frequency    ceFAZolin injection 2 g On Call Procedure    lidocaine (PF) 10 mg/ml (1%) injection 10 mg Once     Family History       Problem Relation (Age of Onset)    Cancer Maternal Grandfather    Cataracts Mother    Diabetes Mother    Heart disease Brother    Hypertension Mother, Father, Sister, Brother    Kidney disease Brother          Tobacco Use    Smoking status: Never    Smokeless tobacco: Never   Substance and Sexual Activity    Alcohol use: Not Currently     Comment: One drink a month    Drug use: No    Sexual activity: Yes     Partners: Female     Birth  control/protection: None     Review of Systems   Constitutional: Negative.    HENT: Negative.     Respiratory:  Negative for cough and shortness of breath.    Cardiovascular:  Negative for chest pain and leg swelling.   Gastrointestinal:  Positive for abdominal pain.   Endocrine: Negative.    Genitourinary:  Positive for decreased urine volume.   Musculoskeletal:  Positive for back pain.   Skin: Negative.    Allergic/Immunologic: Negative.    Neurological: Negative.    Hematological: Negative.    Psychiatric/Behavioral: Negative.     Objective:     Vital Signs (Most Recent):  Temp: 98.1 °F (36.7 °C) (11/30/22 0914)  Pulse: 65 (11/30/22 1048)  Resp: 20 (11/30/22 1048)  BP: (!) 174/97 (11/30/22 0914)  SpO2: (!) 94 % (11/30/22 1048)  O2 Device (Oxygen Therapy): room air (11/30/22 1048)   Vital Signs (24h Range):  Temp:  [98.1 °F (36.7 °C)] 98.1 °F (36.7 °C)  Pulse:  [65-73] 65  Resp:  [16-20] 20  SpO2:  [94 %-100 %] 94 %  BP: (174)/(97) 174/97     Weight: 74.8 kg (165 lb) (11/30/22 0914)  Body mass index is 25.84 kg/m².  Body surface area is 1.88 meters squared.    No intake/output data recorded.    Physical Exam  Vitals and nursing note reviewed.   HENT:      Head: Normocephalic.      Mouth/Throat:      Pharynx: Oropharynx is clear.   Eyes:      Conjunctiva/sclera: Conjunctivae normal.   Cardiovascular:      Rate and Rhythm: Normal rate and regular rhythm.      Pulses: Normal pulses.      Heart sounds: Normal heart sounds.      Arteriovenous access: Right arteriovenous access is present.     Comments: R AVF +thrill  Pulmonary:      Effort: Pulmonary effort is normal.      Breath sounds: Normal breath sounds.   Abdominal:      General: There is no distension.      Palpations: Abdomen is soft.   Musculoskeletal:      Cervical back: Neck supple.      Right lower leg: No edema.      Left lower leg: No edema.   Neurological:      Mental Status: He is alert and oriented to person, place, and time.   Psychiatric:          Mood and Affect: Mood normal.         Behavior: Behavior normal.     Significant Labs:  CBC:   Recent Labs   Lab 11/30/22  0951 11/30/22  0957   WBC 10.63  --    RBC 3.67*  --    HGB 11.7*  --    HCT 36.1* 38     --    MCV 98  --    MCH 31.9*  --    MCHC 32.4  --      CMP:   Recent Labs   Lab 11/30/22  0951   GLU 84   CALCIUM 9.6   ALBUMIN 3.2*   PROT 7.7      K 6.1*   CO2 22*      BUN 67*   CREATININE 13.5*   ALKPHOS 76   ALT 18   AST 12   BILITOT 1.1*     All labs within the past 24 hours have been reviewed.

## 2022-11-30 NOTE — CONSULTS
[facility] - Emergency Dept  Nephrology  Progress Note    Patient Name: Haroldo Dickinson Sr.  MRN: [id number]  Admission Date: 11/30/2022  Hospital Length of Stay: 0 days  Attending Provider: Stanley Guadarrama MD   Primary Care Physician: Tess Ledbetter MD  Principal Problem:<principal problem not specified>    Subjective:     HPI:  Haroldo Dickinson Sr is a 46 year old male with CAD, T2DM, HTN, ESRD on HD (MWF), s/p nephrectomy for suspected RCC on 05/19/22. Reports to ED with left CVA pain. Describes as constant dull pain since Monday 11/28. Reports pain improving with morphine given in ED. Urology is following. Has been on HD since ~8 years ago. ESRD 2/2 HTN. Anuric. Last HD was Monday 11/28. K 6.1, shifted in ED. Chest x ray unremarkable. Denies chest pain, swelling, SOB, fever, or chills. Nephrology consulted for ESRD on HD.       Past Medical History:   Diagnosis Date    CAD (coronary artery disease), native coronary artery 11/21/2019    Cataract     Diabetes mellitus     Diabetic retinopathy     Dialysis patient     DM type 2 causing renal disease, not at goal     ESRD (end stage renal disease) started dialysis 01/2014 6/5/2014    Hyperparathyroidism, secondary renal 6/5/2014    Hypertension     NSTEMI (non-ST elevated myocardial infarction) 12/21/2013    Organ transplant candidate 6/5/2014    Pneumonia     Renal hypertension     Stroke        Past Surgical History:   Procedure Laterality Date    ANGIOGRAM, CORONARY, WITH LEFT HEART CATHETERIZATION N/A 7/1/2021    Procedure: Angiogram, Coronary, with Left Heart Cath;  Surgeon: Miki Zendejas MD;  Location: [facility] CATH LAB;  Service: Cardiology;  Laterality: N/A;    COLONOSCOPY N/A 5/3/2017    Procedure: COLONOSCOPY;  Surgeon: Rufino Carpenter MD;  Location: [facility] ENDO ([address] [place] [address]);  Service: Endoscopy;  Laterality: N/A;  pt states can only schedule on Wednesdays    COLONOSCOPY N/A 2/9/2021    Procedure: COLONOSCOPY;  Surgeon: Edil Wong MD;  Location: [facility]  ENDO (4TH FLR);  Service: Endoscopy;  Laterality: N/A;  Dialysis MWF/ labwork day of procedure  right arm aceess  per Dr. Colin pt can hold Plavix 5 days prior see note- sm  COVID [facility] on 2/6/21 at [facility] -     COLONOSCOPY N/A 2/10/2021    Procedure: COLONOSCOPY;  Surgeon: Robert Ayers MD;  Location: [facility] ENDO (4TH FLR);  Service: Endoscopy;  Laterality: N/A;  rescheduled due to poor bowel prep-BB  negative covid screening 2/6/21-BB  dialysis M-W-F-BB  okay to r/s for 2/9/21 and to hold Plavix per Dr. KIRAN Wong-BB  labs same day-BB    CORONARY STENT PLACEMENT N/A 7/25/2019    Procedure: INSERTION, STENT, CORONARY ARTERY;  Surgeon: Miki Zendejas MD;  Location: [facility] CATH LAB;  Service: Cardiology;  Laterality: N/A;    DIALYSIS FISTULA CREATION      FISTULOGRAM N/A 2/18/2019    Procedure: Fistulogram;  Surgeon: Yaneth De La Cruz MD;  Location: [facility] CATH LAB;  Service: Cardiology;  Laterality: N/A;    FISTULOGRAM Right 7/23/2019    Procedure: Fistulogram;  Surgeon: Oz Cordoba MD;  Location: [facility] CATH LAB;  Service: Cardiology;  Laterality: Right;    LAPAROSCOPIC ROBOT-ASSISTED SURGICAL REMOVAL OF KIDNEY USING DA JAIME XI Right 5/19/2022    Procedure: XI ROBOTIC NEPHRECTOMY;  Surgeon: Aidan Hendricks MD;  Location: [facility] OR 2ND FLR;  Service: Urology;  Laterality: Right;  3hrs    LEFT HEART CATHETERIZATION Left 7/25/2019    Procedure: Left heart cath;  Surgeon: Mkii Zendejas MD;  Location: [facility] CATH LAB;  Service: Cardiology;  Laterality: Left;    RETINAL LASER PROCEDURE Bilateral 2018 or 2017    Dr. Rothman    VASCULAR SURGERY         Review of patient's allergies indicates:   Allergen Reactions    No known allergies      Current Facility-Administered Medications   Medication Frequency    0.9%  NaCl infusion Once    amLODIPine tablet 10 mg Daily    aspirin EC tablet 81 mg Daily    carvediloL tablet 25 mg Daily    cinacalcet tablet 90 mg Daily     Current Outpatient Medications    Medication    amLODIPine (NORVASC) 10 MG tablet    aspirin (ECOTRIN) 81 MG EC tablet    carvediloL (COREG) 25 MG tablet    cinacalcet (SENSIPAR) 90 MG Tab    losartan (COZAAR) 100 MG tablet    AURYXIA 210 mg iron Tab     Facility-Administered Medications Ordered in Other Encounters   Medication Frequency    ceFAZolin injection 2 g On Call Procedure    lidocaine (PF) 10 mg/ml (1%) injection 10 mg Once     Family History       Problem Relation (Age of Onset)    Cancer Maternal Grandfather    Cataracts Mother    Diabetes Mother    Heart disease Brother    Hypertension Mother, Father, Sister, Brother    Kidney disease Brother          Tobacco Use    Smoking status: Never    Smokeless tobacco: Never   Substance and Sexual Activity    Alcohol use: Not Currently     Comment: One drink a month    Drug use: No    Sexual activity: Yes     Partners: Female     Birth control/protection: None     Review of Systems   Constitutional: Negative.    HENT: Negative.     Respiratory:  Negative for cough and shortness of breath.    Cardiovascular:  Negative for chest pain and leg swelling.   Gastrointestinal:  Positive for abdominal pain.   Endocrine: Negative.    Genitourinary:  Positive for decreased urine volume.   Musculoskeletal:  Positive for back pain.   Skin: Negative.    Allergic/Immunologic: Negative.    Neurological: Negative.    Hematological: Negative.    Psychiatric/Behavioral: Negative.     Objective:     Vital Signs (Most Recent):  Temp: 98.1 °F (36.7 °C) (11/30/22 0914)  Pulse: 65 (11/30/22 1048)  Resp: 20 (11/30/22 1048)  BP: (!) 174/97 (11/30/22 0914)  SpO2: (!) 94 % (11/30/22 1048)  O2 Device (Oxygen Therapy): room air (11/30/22 1048)   Vital Signs (24h Range):  Temp:  [98.1 °F (36.7 °C)] 98.1 °F (36.7 °C)  Pulse:  [65-73] 65  Resp:  [16-20] 20  SpO2:  [94 %-100 %] 94 %  BP: (174)/(97) 174/97     Weight: 74.8 kg (165 lb) (11/30/22 0914)  Body mass index is 25.84 kg/m².  Body surface area is 1.88 meters  squared.    No intake/output data recorded.    Physical Exam  Vitals and nursing note reviewed.   HENT:      Head: Normocephalic.      Mouth/Throat:      Pharynx: Oropharynx is clear.   Eyes:      Conjunctiva/sclera: Conjunctivae normal.   Cardiovascular:      Rate and Rhythm: Normal rate and regular rhythm.      Pulses: Normal pulses.      Heart sounds: Normal heart sounds.      Arteriovenous access: Right arteriovenous access is present.     Comments: R AVF +thrill  Pulmonary:      Effort: Pulmonary effort is normal.      Breath sounds: Normal breath sounds.   Abdominal:      General: There is no distension.      Palpations: Abdomen is soft.   Musculoskeletal:      Cervical back: Neck supple.      Right lower leg: No edema.      Left lower leg: No edema.   Neurological:      Mental Status: He is alert and oriented to person, place, and time.   Psychiatric:         Mood and Affect: Mood normal.         Behavior: Behavior normal.     Significant Labs:  CBC:   Recent Labs   Lab 11/30/22  0951 11/30/22  0957   WBC 10.63  --    RBC 3.67*  --    HGB 11.7*  --    HCT 36.1* 38     --    MCV 98  --    MCH 31.9*  --    MCHC 32.4  --      CMP:   Recent Labs   Lab 11/30/22  0951   GLU 84   CALCIUM 9.6   ALBUMIN 3.2*   PROT 7.7      K 6.1*   CO2 22*      BUN 67*   CREATININE 13.5*   ALKPHOS 76   ALT 18   AST 12   BILITOT 1.1*     All labs within the past 24 hours have been reviewed.        Assessment/Plan:     Chronic kidney disease-mineral and bone disorder  -continue cinacalcet   Phos with am labs     Renal mass  - management per primary   S/p R. nephrectomy 5/19/22  Scheduled for left robotic nephrectomy with Dr. Hendricks on 1/5/23    ESRD (end stage renal disease)  Outpatient HD Information:     -Outpatient HD unit: [facility]   -Nephrologist: MD Malissa   -HD tx days: MWF   -HD tx time: 4hrs   -Last HD tx prior to presentation: 11/28/22  -HD access: RFA AVF   -HD modality: iHD   -Residual urine: Anuric    -EDW: 72.6 kg      Plan/Reccomednations    -HD today for metabolic clearance and volume removal, UF goal 2-3L  -Pre and post HD weight   -Strict I&Os  -Renal,lo potassium diet if not NPO   -Daily RFP    Anemia  - Goal 10-11  Hgb 11.1        Thank you for your consult. I will follow-up with patient. Please contact us if you have any additional questions.    Gianna Watkins DNP  Nephrology  [facility]

## 2022-11-30 NOTE — PHARMACY MED REC
"  Admission Medication History     The home medication history was taken by Aicha Tolbert.    You may go to "Admission" then "Reconcile Home Medications" tabs to review and/or act upon these items.     The home medication list has been updated by the Pharmacy department.   Please read ALL comments highlighted in yellow.   Please address this information as you see fit.    Feel free to contact us if you have any questions or require assistance.      The medications listed below were removed from the home medication list. Please reorder if appropriate:  Patient reports no longer taking the following medication(s):  CINACALCET 90 MG    Medications listed below were obtained from: Patient/family      Current Outpatient Medications on File Prior to Encounter   Medication Sig    amLODIPine (NORVASC) 10 MG tablet Take 1 tablet (10 mg total) by mouth once daily.    aspirin (ECOTRIN) 81 MG EC tablet   Take 1 tablet (81 mg total) by mouth once daily.    AURYXIA 210 mg iron Tab       Take 420 mg by mouth 3 (three) times daily with meals. Take 1 with snacks    carvediloL (COREG) 25 MG tablet   TAKE 1 TABLET BY MOUTH TWICE A DAY WITH FOOD    losartan (COZAAR) 100 MG tablet Take 100 mg by mouth once daily.     Aicha Tolbert  EXT [id number]                .        "

## 2022-11-30 NOTE — ED NOTES
Patient arrives via EMS, came from dialysis with concerns of left flank pain onset Monday, no OTC meds, did not get dialysis today

## 2022-11-30 NOTE — SUBJECTIVE & OBJECTIVE
Past Medical History:   Diagnosis Date    CAD (coronary artery disease), native coronary artery 11/21/2019    Cataract     Diabetes mellitus     Diabetic retinopathy     Dialysis patient     DM type 2 causing renal disease, not at goal     ESRD (end stage renal disease) started dialysis 01/2014 6/5/2014    Hyperparathyroidism, secondary renal 6/5/2014    Hypertension     NSTEMI (non-ST elevated myocardial infarction) 12/21/2013    Organ transplant candidate 6/5/2014    Pneumonia     Renal hypertension     Stroke        Past Surgical History:   Procedure Laterality Date    ANGIOGRAM, CORONARY, WITH LEFT HEART CATHETERIZATION N/A 7/1/2021    Procedure: Angiogram, Coronary, with Left Heart Cath;  Surgeon: Miki Zendejas MD;  Location: [facility] CATH LAB;  Service: Cardiology;  Laterality: N/A;    COLONOSCOPY N/A 5/3/2017    Procedure: COLONOSCOPY;  Surgeon: Rufino Carpenter MD;  [facility]  Service: Endoscopy;  Laterality: N/A;  pt states can only schedule on Wednesdays    COLONOSCOPY N/A 2/9/2021    Procedure: COLONOSCOPY;  Surgeon: Edil Wong MD;  [facility]  Service: Endoscopy;  Laterality: N/A;  Dialysis MWF/ labwork day of procedure  right arm aceess  per Dr. Colin pt can hold Plavix 5 days prior see note- sm  COVID test on 2/6/21 at [facility] - sm    COLONOSCOPY N/A 2/10/2021    Procedure: COLONOSCOPY;  Surgeon: Robert Ayers MD;  [facility]  Service: Endoscopy;  Laterality: N/A;  rescheduled due to poor bowel prep-BB  negative covid screening 2/6/21-BB  dialysis M-W-F-BB  okay to r/s for 2/9/21 and to hold Plavix per Dr. KIRAN Wong-BB  labs same day-BB    CORONARY STENT PLACEMENT N/A 7/25/2019    Procedure: INSERTION, STENT, CORONARY ARTERY;  Surgeon: Miki Zendejas MD;  Location: [facility] CATH LAB;  Service: Cardiology;  Laterality: N/A;    DIALYSIS FISTULA CREATION      FISTULOGRAM N/A 2/18/2019    Procedure: Fistulogram;  Surgeon: Yaneth De La Cruz MD;   Location: [facility] CATH LAB;  Service: Cardiology;  Laterality: N/A;    FISTULOGRAM Right 7/23/2019    Procedure: Fistulogram;  Surgeon: Oz Cordoba MD;  Location: [facility] CATH LAB;  Service: Cardiology;  Laterality: Right;    LAPAROSCOPIC ROBOT-ASSISTED SURGICAL REMOVAL OF KIDNEY USING DA JAIME XI Right 5/19/2022    Procedure: XI ROBOTIC NEPHRECTOMY;  Surgeon: Aidan Hendricks MD;  Location: [facility] OR [facility] FLR;  Service: Urology;  Laterality: Right;  3hrs    LEFT HEART CATHETERIZATION Left 7/25/2019    Procedure: Left heart cath;  Surgeon: Miki Zendejas MD;  Location: [facility] CATH LAB;  Service: Cardiology;  Laterality: Left;    RETINAL LASER PROCEDURE Bilateral 2018 or 2017    Dr. Rothman    VASCULAR SURGERY         Review of patient's allergies indicates:   Allergen Reactions    No known allergies        Family History       Problem Relation (Age of Onset)    Cancer Maternal Grandfather    Cataracts Mother    Diabetes Mother    Heart disease Brother    Hypertension Mother, Father, Sister, Brother    Kidney disease Brother            Tobacco Use    Smoking status: Never    Smokeless tobacco: Never   Substance and Sexual Activity    Alcohol use: Not Currently     Comment: One drink a month    Drug use: No    Sexual activity: Yes     Partners: Female     Birth control/protection: None       Review of Systems    Objective:     Temp:  [98.1 °F (36.7 °C)] 98.1 °F (36.7 °C)  Pulse:  [65-73] 65  Resp:  [16-20] 20  SpO2:  [94 %-100 %] 94 %  BP: (174)/(97) 174/97     Body mass index is 25.84 kg/m².    Date 11/30/22 0700 - 12/01/22 0659   Shift 3449-8054 4671-5152 3738-5889 24 Hour Total   INTAKE   IV Piggyback 285.4   285.4   Shift Total(mL/kg) 285.4(3.8)   285.4(3.8)   OUTPUT   Shift Total(mL/kg)       Weight (kg) 74.8 74.8 74.8 74.8          Drains       Drain  Duration                  Hemodialysis AV Fistula Right forearm -- days                    Physical Exam  Constitutional:       Appearance: Normal appearance.    HENT:      Head: Normocephalic.   Eyes:      Pupils: Pupils are equal, round, and reactive to light.   Cardiovascular:      Rate and Rhythm: Normal rate.   Pulmonary:      Effort: Pulmonary effort is normal.   Chest:      Chest wall: No tenderness.   Abdominal:      General: Abdomen is flat. There is no distension.      Palpations: Abdomen is soft.      Tenderness: There is no abdominal tenderness. There is left CVA tenderness. There is no right CVA tenderness.   Musculoskeletal:         General: Normal range of motion.      Cervical back: Normal range of motion.   Skin:     General: Skin is warm.   Neurological:      General: No focal deficit present.      Mental Status: He is alert and oriented to person, place, and time.   Psychiatric:         Mood and Affect: Mood normal.         Behavior: Behavior normal.       Significant Labs:    BMP:  Recent Labs   Lab 11/30/22  0951      K 6.1*      CO2 22*   BUN 67*   CREATININE 13.5*   CALCIUM 9.6       CBC:  Recent Labs   Lab 11/30/22  0951 11/30/22  0957   WBC 10.63  --    HGB 11.7*  --    HCT 36.1* 38     --        All pertinent labs results from the past 24 hours have been reviewed.    Significant Imaging:  All pertinent imaging results/findings from the past 24 hours have been reviewed.

## 2022-11-30 NOTE — HPI
Haroldo Dickinson Sr. is a 47 y.o. male who presented to the ED after having a few days of left flank pain following HD. Urology consulted for known left renal mass. Patient is s/p right robotic on 5/19/22. Path was T1a. Patient pmhx ADPCKD, ESRD 2/2 HTN.  Does HD, MWF via a right forearm AVF. He does not urinate at all. However, yesterday he stated he saw a small amount of blood at the meatus. Patient states he has not taken any medications for his left flank discomfort. Denies fevers, chills, n/v, sob, chest pain.    Prior MRI  reviewed with Dr. Hendricks which showed no evidence of residual disease s/p right nephrectomy. 2 indeterminate left renal lesions. 4 cm and 3 cm. Given the known prior malignancy in his right kidney coupled with solid renal masses in the left kidney, presumably this could be concerning for renal cell carcinoma and likely is renal cell carcinoma.  Therefore, left robotic nephrectomy was decided for 1/5/23     On assessment AF, hypertensive, HR wnl, WBC 10.6, Hgb 11.7. No UA due to patient anuric on HD MWF. CT shows polycystic left kidney with a mass of the left upper pole. MRI 11/22 - shows upper pole and mid pole mass as described above.

## 2022-11-30 NOTE — H&P
[facility]  Hospital Medicine  History & Physical    Patient Name: Haroldo Dickinson Sr.  MRN: [id number]  Patient Class: OP- Observation  Admission Date: 11/30/2022  Attending Physician: Stanley Guadarrama MD   Primary Care Provider: Tess Ledbetter MD         Patient information was obtained from patient and ER records.     Subjective:     Principal Problem:<principal problem not specified>    Chief Complaint:   Chief Complaint   Patient presents with    Flank Pain     EMS arrival for left flank pain        HPI: 47-year-old male with past medical history of CAD, DM, ESRD on HD MWF (last dialyzed Monday), hyperparathyroidism, hypertension, CVA, renal cell carcinoma status post right nephrectomy, recently diagnosed left-sided renal mass concerning for malignancy and scheduled for nephrectomy in January presenting with left-sided flank pain that began 2 days ago.  It has been constant since onset.  He describes the pain as sharp.  It radiates across the abdomen.  He denies back pain.  He denies chest pain, shortness of breath, nausea, vomiting, diarrhea, fever, chills.  He makes a very scant amount of urine, typically once per month.  He did urinate last night reports seeing blood in the urine.  He is not taken any medication for his symptoms.  He denies other worsening or alleviating factors.    Seen by urology in ER, Nephrectomy schedule for 01/05/2023, no further recs. Also taken for urgent HD.      Past Medical History:   Diagnosis Date    CAD (coronary artery disease), native coronary artery 11/21/2019    Cataract     Diabetes mellitus     Diabetic retinopathy     Dialysis patient     DM type 2 causing renal disease, not at goal     ESRD (end stage renal disease) started dialysis 01/2014 6/5/2014    Hyperparathyroidism, secondary renal 6/5/2014    Hypertension     NSTEMI (non-ST elevated myocardial infarction) 12/21/2013    Organ transplant candidate 6/5/2014    Pneumonia     Renal hypertension      Stroke        Past Surgical History:   Procedure Laterality Date    ANGIOGRAM, CORONARY, WITH LEFT HEART CATHETERIZATION N/A 7/1/2021    Procedure: Angiogram, Coronary, with Left Heart Cath;  Surgeon: Miki Zendejas MD;  Location: [facility] CATH LAB;  Service: Cardiology;  Laterality: N/A;    COLONOSCOPY N/A 5/3/2017    Procedure: COLONOSCOPY;  Surgeon: Rufino Carpenter MD;  Location: [facility] ENDO (4TH FLR);  Service: Endoscopy;  Laterality: N/A;  pt states can only schedule on Wednesdays    COLONOSCOPY N/A 2/9/2021    Procedure: COLONOSCOPY;  Surgeon: Edil Wong MD;  Location: [facility] ENDO (4TH FLR);  Service: Endoscopy;  Laterality: N/A;  Dialysis MWF/ labwork day of procedure  right arm aceess  per Dr. Colin pt can hold Plavix 5 days prior see note- sm  COVID test on 2/6/21 at [facility] -     COLONOSCOPY N/A 2/10/2021    Procedure: COLONOSCOPY;  Surgeon: Robert Ayers MD;  Location: [facility] (4TH FLR);  Service: Endoscopy;  Laterality: N/A;  rescheduled due to poor bowel prep-BB  negative covid screening 2/6/21-BB  dialysis M-W-F-BB  okay to r/s for 2/9/21 and to hold Plavix per Dr. KIRAN Wong-BB  labs same day-BB    CORONARY STENT PLACEMENT N/A 7/25/2019    Procedure: INSERTION, STENT, CORONARY ARTERY;  Surgeon: Miki Zendejas MD;  Location: [facility] CATH LAB;  Service: Cardiology;  Laterality: N/A;    DIALYSIS FISTULA CREATION      FISTULOGRAM N/A 2/18/2019    Procedure: Fistulogram;  Surgeon: Yaneth De La Cruz MD;  Location: [facility] CATH LAB;  Service: Cardiology;  Laterality: N/A;    FISTULOGRAM Right 7/23/2019    Procedure: Fistulogram;  Surgeon: Oz Cordoba MD;  Location: [facility] CATH LAB;  Service: Cardiology;  Laterality: Right;    LAPAROSCOPIC ROBOT-ASSISTED SURGICAL REMOVAL OF KIDNEY USING DA JAIME XI Right 5/19/2022    Procedure: XI ROBOTIC NEPHRECTOMY;  Surgeon: Aidan Hendricks MD;  [facility];  Service: Urology;  Laterality: Right;  3hrs    LEFT HEART CATHETERIZATION Left  7/25/2019    Procedure: Left heart cath;  Surgeon: Miki Zendejas MD;  Location: [facility] CATH LAB;  Service: Cardiology;  Laterality: Left;    RETINAL LASER PROCEDURE Bilateral 2018 or 2017    Dr. Rothman    VASCULAR SURGERY         Review of patient's allergies indicates:   Allergen Reactions    No known allergies        Current Facility-Administered Medications on File Prior to Encounter   Medication    ceFAZolin injection 2 g    lidocaine (PF) 10 mg/ml (1%) injection 10 mg     Current Outpatient Medications on File Prior to Encounter   Medication Sig    amLODIPine (NORVASC) 10 MG tablet Take 1 tablet (10 mg total) by mouth once daily.    aspirin (ECOTRIN) 81 MG EC tablet Take 1 tablet (81 mg total) by mouth once daily.    AURYXIA 210 mg iron Tab Take 420 mg by mouth 3 (three) times daily with meals. Take 1 with snacks    carvediloL (COREG) 25 MG tablet TAKE 1 TABLET BY MOUTH TWICE A DAY WITH FOOD    losartan (COZAAR) 100 MG tablet Take 100 mg by mouth once daily.    [DISCONTINUED] cinacalcet (SENSIPAR) 90 MG Tab Take 90 mg by mouth once daily.    [DISCONTINUED] sevelamer carbonate (RENVELA) 800 mg Tab Take 1 tablet (800 mg total) by mouth 3 (three) times daily with meals.     Family History       Problem Relation (Age of Onset)    Cancer Maternal Grandfather    Cataracts Mother    Diabetes Mother    Heart disease Brother    Hypertension Mother, Father, Sister, Brother    Kidney disease Brother          Tobacco Use    Smoking status: Never    Smokeless tobacco: Never   Substance and Sexual Activity    Alcohol use: Not Currently     Comment: One drink a month    Drug use: No    Sexual activity: Yes     Partners: Female     Birth control/protection: None     Review of Systems   Constitutional:  Positive for appetite change.   HENT: Negative.     Eyes: Negative.    Cardiovascular: Negative.    Gastrointestinal: Negative.    Endocrine: Negative.    Genitourinary:  Positive for flank pain.    Allergic/Immunologic: Negative.    Neurological: Negative.    Hematological: Negative.    Psychiatric/Behavioral: Negative.     Objective:     Vital Signs (Most Recent):  Temp: 98 °F (36.7 °C) (11/30/22 1441)  Pulse: 73 (11/30/22 1630)  Resp: 20 (11/30/22 1048)  BP: (!) 169/97 (11/30/22 1630)  SpO2: (!) 94 % (11/30/22 1048)   Vital Signs (24h Range):  Temp:  [98 °F (36.7 °C)-98.1 °F (36.7 °C)] 98 °F (36.7 °C)  Pulse:  [53-73] 73  Resp:  [16-20] 20  SpO2:  [94 %-100 %] 94 %  BP: (169-203)/() 169/97     Weight: 74.8 kg (165 lb)  Body mass index is 25.84 kg/m².    Physical Exam  Constitutional:       General: He is in acute distress.      Appearance: Normal appearance.   HENT:      Head: Normocephalic.      Right Ear: Tympanic membrane normal.      Mouth/Throat:      Mouth: Mucous membranes are moist.   Eyes:      Pupils: Pupils are equal, round, and reactive to light.   Cardiovascular:      Rate and Rhythm: Normal rate.   Pulmonary:      Effort: Pulmonary effort is normal.   Abdominal:      General: Abdomen is flat.   Musculoskeletal:         General: Normal range of motion.   Skin:     General: Skin is warm.   Neurological:      General: No focal deficit present.      Mental Status: He is alert.         CRANIAL NERVES     CN III, IV, VI   Pupils are equal, round, and reactive to light.     Significant Labs: All pertinent labs within the past 24 hours have been reviewed.  BMP:   Recent Labs   Lab 11/30/22  0951   GLU 84      K 6.1*      CO2 22*   BUN 67*   CREATININE 13.5*   CALCIUM 9.6   MG 1.9       Significant Imaging: I have reviewed all pertinent imaging results/findings within the past 24 hours.    Assessment/Plan:     Renal mass  Nephrectomy planned for 1/05/2023  No futher w/u needed per urology      Essential hypertension  C/W home medicines  Assess after dialysis      Iron deficiency anemia  C/W iron supplement      End stage renal failure on dialysis  HD on M/W/F  Last HD on Wed  Will  have dialysis today  Nephrology consult        VTE Risk Mitigation (From admission, onward)    None             Stanley Guadarrama MD  Department of Hospital Medicine   [facility]

## 2022-11-30 NOTE — ASSESSMENT & PLAN NOTE
- Patient stable with known left renal mass scheduled on 1/5/22 for left robotic nephrectomy with Dr. Hendricks  - [facility] stable  - Continue HD  - No acute urologic intervention

## 2022-11-30 NOTE — ASSESSMENT & PLAN NOTE
Outpatient HD Information:     -Outpatient [place] [facility]   -Nephrologist: MD Malissa   -HD tx days: MWF   -HD tx time: 4hrs   -Last HD tx prior to presentation: 11/28/22  -HD access: RFA AVF   -HD modality: iHD   -Residual urine: Anuric   -EDW: 72.6 kg      Plan/Reccomednations    -HD today for metabolic clearance and volume removal, UF goal 2-3L  -Pre and post HD weight   -Strict I&Os  -Renal,lo potassium diet if not NPO   -Daily RFP

## 2022-11-30 NOTE — Clinical Note
Diagnosis: Hyperkalemia [233696]   Future Attending Provider: MANDEEP WRIGHT [[id number]]   Is the patient being sent to ED Observation?: No   Admitting Provider:: MANDEEP WRIGHT [[id number]]   Special Needs:: No Special Needs [1]

## 2022-11-30 NOTE — SUBJECTIVE & OBJECTIVE
Past Medical History:   Diagnosis Date    CAD (coronary artery disease), native coronary artery 11/21/2019    Cataract     Diabetes mellitus     Diabetic retinopathy     Dialysis patient     DM type 2 causing renal disease, not at goal     ESRD (end stage renal disease) started dialysis 01/2014 6/5/2014    Hyperparathyroidism, secondary renal 6/5/2014    Hypertension     NSTEMI (non-ST elevated myocardial infarction) 12/21/2013    Organ transplant candidate 6/5/2014    Pneumonia     Renal hypertension     Stroke        Past Surgical History:   Procedure Laterality Date    ANGIOGRAM, CORONARY, WITH LEFT HEART CATHETERIZATION N/A 7/1/2021    Procedure: Angiogram, Coronary, with Left Heart Cath;  Surgeon: Miki Zendejas MD;  Location: [facility] CATH LAB;  Service: Cardiology;  Laterality: N/A;    COLONOSCOPY N/A 5/3/2017    Procedure: COLONOSCOPY;  Surgeon: Rufino Carpenter MD;  [facility]  Service: Endoscopy;  Laterality: N/A;  pt states can only schedule on Wednesdays    COLONOSCOPY N/A 2/9/2021    Procedure: COLONOSCOPY;  Surgeon: Edil Wong MD;  [facility]  Service: Endoscopy;  Laterality: N/A;  Dialysis MWF/ labwork day of procedure  right arm aceess  per Dr. Colin pt can hold Plavix 5 days prior see note- sm  COVID test on 2/6/21 at [facility] - sm    COLONOSCOPY N/A 2/10/2021    Procedure: COLONOSCOPY;  Surgeon: Robert Ayers MD;  [facility]  Service: Endoscopy;  Laterality: N/A;  rescheduled due to poor bowel prep-BB  negative covid screening 2/6/21-BB  dialysis M-W-F-BB  okay to r/s for 2/9/21 and to hold Plavix per Dr. KIRAN Wong-BB  labs same day-BB    CORONARY STENT PLACEMENT N/A 7/25/2019    Procedure: INSERTION, STENT, CORONARY ARTERY;  Surgeon: Miki Zendejas MD;  Location: [facility] CATH LAB;  Service: Cardiology;  Laterality: N/A;    DIALYSIS FISTULA CREATION      FISTULOGRAM N/A 2/18/2019    Procedure: Fistulogram;  Surgeon: Yaneth De La Cruz MD;   Location: [facility] CATH LAB;  Service: Cardiology;  Laterality: N/A;    FISTULOGRAM Right 7/23/2019    Procedure: Fistulogram;  Surgeon: Oz Cordoba MD;  Location: [facility] CATH LAB;  Service: Cardiology;  Laterality: Right;    LAPAROSCOPIC ROBOT-ASSISTED SURGICAL REMOVAL OF KIDNEY USING DA JAIME XI Right 5/19/2022    Procedure: XI ROBOTIC NEPHRECTOMY;  Surgeon: Aidan Hendricks MD;  Location: [facility] OR [facility] FLR;  Service: Urology;  Laterality: Right;  3hrs    LEFT HEART CATHETERIZATION Left 7/25/2019    Procedure: Left heart cath;  Surgeon: Miki Zendejas MD;  Location: [facility] CATH LAB;  Service: Cardiology;  Laterality: Left;    RETINAL LASER PROCEDURE Bilateral 2018 or 2017    Dr. Rothman    VASCULAR SURGERY         Review of patient's allergies indicates:   Allergen Reactions    No known allergies        Current Facility-Administered Medications on File Prior to Encounter   Medication    ceFAZolin injection 2 g    lidocaine (PF) 10 mg/ml (1%) injection 10 mg     Current Outpatient Medications on File Prior to Encounter   Medication Sig    amLODIPine (NORVASC) 10 MG tablet Take 1 tablet (10 mg total) by mouth once daily.    aspirin (ECOTRIN) 81 MG EC tablet Take 1 tablet (81 mg total) by mouth once daily.    AURYXIA 210 mg iron Tab Take 420 mg by mouth 3 (three) times daily with meals. Take 1 with snacks    carvediloL (COREG) 25 MG tablet TAKE 1 TABLET BY MOUTH TWICE A DAY WITH FOOD    losartan (COZAAR) 100 MG tablet Take 100 mg by mouth once daily.    [DISCONTINUED] cinacalcet (SENSIPAR) 90 MG Tab Take 90 mg by mouth once daily.    [DISCONTINUED] sevelamer carbonate (RENVELA) 800 mg Tab Take 1 tablet (800 mg total) by mouth 3 (three) times daily with meals.     Family History       Problem Relation (Age of Onset)    Cancer Maternal Grandfather    Cataracts Mother    Diabetes Mother    Heart disease Brother    Hypertension Mother, Father, Sister, Brother    Kidney disease Brother          Tobacco Use    Smoking  status: Never    Smokeless tobacco: Never   Substance and Sexual Activity    Alcohol use: Not Currently     Comment: One drink a month    Drug use: No    Sexual activity: Yes     Partners: Female     Birth control/protection: None     Review of Systems   Constitutional:  Positive for appetite change.   HENT: Negative.     Eyes: Negative.    Cardiovascular: Negative.    Gastrointestinal: Negative.    Endocrine: Negative.    Genitourinary:  Positive for flank pain.   Allergic/Immunologic: Negative.    Neurological: Negative.    Hematological: Negative.    Psychiatric/Behavioral: Negative.     Objective:     Vital Signs (Most Recent):  Temp: 98 °F (36.7 °C) (11/30/22 1441)  Pulse: 73 (11/30/22 1630)  Resp: 20 (11/30/22 1048)  BP: (!) 169/97 (11/30/22 1630)  SpO2: (!) 94 % (11/30/22 1048)   Vital Signs (24h Range):  Temp:  [98 °F (36.7 °C)-98.1 °F (36.7 °C)] 98 °F (36.7 °C)  Pulse:  [53-73] 73  Resp:  [16-20] 20  SpO2:  [94 %-100 %] 94 %  BP: (169-203)/() 169/97     Weight: 74.8 kg (165 lb)  Body mass index is 25.84 kg/m².    Physical Exam  Constitutional:       General: He is in acute distress.      Appearance: Normal appearance.   HENT:      Head: Normocephalic.      Right Ear: Tympanic membrane normal.      Mouth/Throat:      Mouth: Mucous membranes are moist.   Eyes:      Pupils: Pupils are equal, round, and reactive to light.   Cardiovascular:      Rate and Rhythm: Normal rate.   Pulmonary:      Effort: Pulmonary effort is normal.   Abdominal:      General: Abdomen is flat.   Musculoskeletal:         General: Normal range of motion.   Skin:     General: Skin is warm.   Neurological:      General: No focal deficit present.      Mental Status: He is alert.         CRANIAL NERVES     CN III, IV, VI   Pupils are equal, round, and reactive to light.     Significant Labs: All pertinent labs within the past 24 hours have been reviewed.  BMP:   Recent Labs   Lab 11/30/22  0951   GLU 84      K 6.1*       CO2 22*   BUN 67*   CREATININE 13.5*   CALCIUM 9.6   MG 1.9       Significant Imaging: I have reviewed all pertinent imaging results/findings within the past 24 hours.

## 2022-11-30 NOTE — CONSULTS
[facility]  Urology  Consult Note    Patient Name: Haroldo Dickinson Sr.  MRN: [id number]  Admission Date: 11/30/2022  Hospital Length of Stay: 0   Code Status: Prior   Attending Provider: Stanley Guadarrama MD   Consulting Provider: Blue Guerrero MD  Primary Care Physician: Tess Ledbetter MD  Principal Problem:<principal problem not specified>    Inpatient consult to Urology  Consult performed by: Blue Guerrero MD  Consult ordered by: Stanley Guadarrama MD          Subjective:     HPI:  Haroldo Dickinson Sr. is a 47 y.o. male who presented to the ED after having a few days of left flank pain following HD. Urology consulted for known left renal mass. Patient is s/p right robotic on 5/19/22. Path was T1a. Patient pmhx ADPCKD, ESRD 2/2 HTN.  Does HD, MWF via a right forearm AVF. He does not urinate at all. However, yesterday he stated he saw a small amount of blood at the meatus. Patient states he has not taken any medications for his left flank discomfort. Denies fevers, chills, n/v, sob, chest pain.    Prior MRI  reviewed with Dr. Hendricks which showed no evidence of residual disease s/p right nephrectomy. 2 indeterminate left renal lesions. 4 cm and 3 cm. Given the known prior malignancy in his right kidney coupled with solid renal masses in the left kidney, presumably this could be concerning for renal cell carcinoma and likely is renal cell carcinoma.  Therefore, left robotic nephrectomy was decided for 1/5/23     On assessment AF, hypertensive, HR wnl, WBC 10.6, Hgb 11.7. No UA due to patient anuric on HD MWF. CT shows polycystic left kidney with a mass of the left upper pole. MRI 11/22 - shows upper pole and mid pole mass as described above.       Past Medical History:   Diagnosis Date    CAD (coronary artery disease), native coronary artery 11/21/2019    Cataract     Diabetes mellitus     Diabetic retinopathy     Dialysis patient     DM type 2 causing renal disease, not at goal     ESRD (end stage renal  disease) started dialysis 01/2014 6/5/2014    Hyperparathyroidism, secondary renal 6/5/2014    Hypertension     NSTEMI (non-ST elevated myocardial infarction) 12/21/2013    Organ transplant candidate 6/5/2014    Pneumonia     Renal hypertension     Stroke        Past Surgical History:   Procedure Laterality Date    ANGIOGRAM, CORONARY, WITH LEFT HEART CATHETERIZATION N/A 7/1/2021    Procedure: Angiogram, Coronary, with Left Heart Cath;  Surgeon: Miki Zendejas MD;  Location: [facility] CATH LAB;  Service: Cardiology;  Laterality: N/A;    COLONOSCOPY N/A 5/3/2017    Procedure: COLONOSCOPY;  Surgeon: Rufino Carpenter MD;  Location: [facility] ENDO (4TH FLR);  Service: Endoscopy;  Laterality: N/A;  pt states can only schedule on Wednesdays    COLONOSCOPY N/A 2/9/2021    Procedure: COLONOSCOPY;  Surgeon: Edil Wong MD;  Location: [facility] ENDO (4TH FLR);  Service: Endoscopy;  Laterality: N/A;  Dialysis MWF/ labwork day of procedure  right arm aceess  per Dr. Colin pt can hold Plavix 5 days prior see note- sm  COVID test on 2/6/21 at [facility] - sm    COLONOSCOPY N/A 2/10/2021    Procedure: COLONOSCOPY;  Surgeon: Robert Ayers MD;  Location: [facility] ENDO (4TH FLR);  Service: Endoscopy;  Laterality: N/A;  rescheduled due to poor bowel prep-BB  negative covid screening 2/6/21-BB  dialysis M-W-F-BB  okay to r/s for 2/9/21 and to hold Plavix per Dr. KIRAN Wong-BB  labs same day-BB    CORONARY STENT PLACEMENT N/A 7/25/2019    Procedure: INSERTION, STENT, CORONARY ARTERY;  Surgeon: Miki Zendejas MD;  Location: [facility] CATH LAB;  Service: Cardiology;  Laterality: N/A;    DIALYSIS FISTULA CREATION      FISTULOGRAM N/A 2/18/2019    Procedure: Fistulogram;  Surgeon: Yaneth De La Cruz MD;  Location: [facility] CATH LAB;  Service: Cardiology;  Laterality: N/A;    FISTULOGRAM Right 7/23/2019    Procedure: Fistulogram;  Surgeon: Oz Cordoba MD;  Location: [facility] CATH LAB;  Service: Cardiology;  Laterality: Right;    LAPAROSCOPIC  ROBOT-ASSISTED SURGICAL REMOVAL OF KIDNEY USING DA JAIME XI Right 5/19/2022    Procedure: XI ROBOTIC NEPHRECTOMY;  Surgeon: Aidan Hendricks MD;  Location: [facility] OR [address] [place] [address];  Service: Urology;  Laterality: Right;  3hrs    LEFT HEART CATHETERIZATION Left 7/25/2019    Procedure: Left heart cath;  Surgeon: Miki Zendejas MD;  Location: [facility] CATH LAB;  Service: Cardiology;  Laterality: Left;    RETINAL LASER PROCEDURE Bilateral 2018 or 2017    Dr. Rothman    VASCULAR SURGERY         Review of patient's allergies indicates:   Allergen Reactions    No known allergies        Family History       Problem Relation (Age of Onset)    Cancer Maternal Grandfather    Cataracts Mother    Diabetes Mother    Heart disease Brother    Hypertension Mother, Father, Sister, Brother    Kidney disease Brother            Tobacco Use    Smoking status: Never    Smokeless tobacco: Never   Substance and Sexual Activity    Alcohol use: Not Currently     Comment: One drink a month    Drug use: No    Sexual activity: Yes     Partners: Female     Birth control/protection: None       Review of Systems    Objective:     Temp:  [98.1 °F (36.7 °C)] 98.1 °F (36.7 °C)  Pulse:  [65-73] 65  Resp:  [16-20] 20  SpO2:  [94 %-100 %] 94 %  BP: (174)/(97) 174/97     Body mass index is 25.84 kg/m².    Date 11/30/22 0700 - 12/01/22 0659   Shift 4423-9279 3725-8826 4972-5074 24 Hour Total   INTAKE   IV Piggyback 285.4   285.4   Shift Total(mL/kg) 285.4(3.8)   285.4(3.8)   OUTPUT   Shift Total(mL/kg)       Weight (kg) 74.8 74.8 74.8 74.8          Drains       Drain  Duration                  Hemodialysis AV Fistula Right forearm -- days                    Physical Exam  Constitutional:       Appearance: Normal appearance.   HENT:      Head: Normocephalic.   Eyes:      Pupils: Pupils are equal, round, and reactive to light.   Cardiovascular:      Rate and Rhythm: Normal rate.   Pulmonary:      Effort: Pulmonary effort is normal.   Chest:      Chest  wall: No tenderness.   Abdominal:      General: Abdomen is flat. There is no distension.      Palpations: Abdomen is soft.      Tenderness: There is no abdominal tenderness. There is left CVA tenderness. There is no right CVA tenderness.   Musculoskeletal:         General: Normal range of motion.      Cervical back: Normal range of motion.   Skin:     General: Skin is warm.   Neurological:      General: No focal deficit present.      Mental Status: He is alert and oriented to person, place, and time.   Psychiatric:         Mood and Affect: Mood normal.         Behavior: Behavior normal.       Significant Labs:    BMP:  Recent Labs   Lab 11/30/22  0951      K 6.1*      CO2 22*   BUN 67*   CREATININE 13.5*   CALCIUM 9.6       CBC:  Recent Labs   Lab 11/30/22  0951 11/30/22  0957   WBC 10.63  --    HGB 11.7*  --    HCT 36.1* 38     --        All pertinent labs results from the past 24 hours have been reviewed.    Significant Imaging:  All pertinent imaging results/findings from the past 24 hours have been reviewed.                      Assessment and Plan:     Renal mass  - Patient stable with known left renal mass scheduled on 1/5/22 for left robotic nephrectomy with Dr. Hendricks  - Hgb stable  - Continue HD  - No acute urologic intervention        VTE Risk Mitigation (From admission, onward)    None          Thank you for your consult. I will sign off. Please contact us if you have any additional questions.    Blue Guerrero MD  Urology  [facility]

## 2022-11-30 NOTE — HPI
47-year-old male with past medical history of CAD, DM, ESRD on HD MWF (last dialyzed Monday), hyperparathyroidism, hypertension, CVA, renal cell carcinoma status post right nephrectomy, recently diagnosed left-sided renal mass concerning for malignancy and scheduled for nephrectomy in January presenting with left-sided flank pain that began 2 days ago.  It has been constant since onset.  He describes the pain as sharp.  It radiates across the abdomen.  He denies back pain.  He denies chest pain, shortness of breath, nausea, vomiting, diarrhea, fever, chills.  He makes a very scant amount of urine, typically once per month.  He did urinate last night reports seeing blood in the urine.  He is not taken any medication for his symptoms.  He denies other worsening or alleviating factors.    Seen by urology in ER, Nephrectomy schedule for 01/05/2023, no further recs. Also taken for urgent HD.

## 2022-11-30 NOTE — PROGRESS NOTES
Patient arrived in a stretcher to dialysis unit.   Report received as documented in PER Handoff on Doc Flowsheet.  VS's per Doc Flowsheet.    Observation Hemodialysis initiated using the following:    Dialysis Access: Right FA fistula    Needle size: 15 gauge  X2  Insertion with no complications.    Will Maintain telemetry and blood pressure monitoring throughout treatment.  Refer to flowsheet and MAR for details.

## 2022-11-30 NOTE — HPI
Haroldo Dickinson Sr is a 46 year old male with CAD, T2DM, HTN, ESRD on HD (MWF), s/p nephrectomy for suspected RCC on 05/19/22. Reports to ED with left CVA pain. Describes as constant dull pain since Monday 11/28. Reports pain improving with morphine given in ED. Urology is following. Has been on HD since ~8 years ago. ESRD 2/2 HTN. Anuric. Last HD was Monday 11/28. K 6.1, shifted in ED. Chest x ray unremarkable. Denies chest pain, swelling, SOB, fever, or chills. Nephrology consulted for ESRD on HD.

## 2022-11-30 NOTE — ED NOTES
I-STAT Chem-8+ Results:   Value Reference Range   Sodium 136 136-145 mmol/L   Potassium  6.0 3.5-5.1 mmol/L   Chloride 104  mmol/L   Ionized Calcium 1.20 1.06-1.42 mmol/L   CO2 (measured) 23 23-29 mmol/L   Glucose 85  mg/dL   BUN 64 6-30 mg/dL   Creatinine 15.0 0.5-1.4 mg/dL   Hematocrit 38 36-54%

## 2022-11-30 NOTE — ED NOTES
Patient identifiers verified and correct for Mr Dickinson  C/C:  Left flank pain SEE NN  APPEARANCE: awake and alert in NAD. PAIN  8/10  SKIN: warm, dry and intact. No breakdown or bruising.  MUSCULOSKELETAL: Patient moving all extremities spontaneously, no obvious swelling or deformities noted. Ambulates independently with cane.  RESPIRATORY: Denies shortness of breath.Respirations unlabored.   CARDIAC: Denies CP, 2+ distal pulses; no peripheral edema  ABDOMEN: S/ND/NT, Denies nausea  :  does not urinate, dialysis access ALBARO.  Neurologic: AAO x 4; follows commands equal strength in all extremities; denies numbness/tingling. Denies dizziness  Denies weqakness,

## 2022-11-30 NOTE — ED PROVIDER NOTES
Encounter Date: 11/30/2022       History     Chief Complaint   Patient presents with    Flank Pain     EMS arrival for left flank pain     47-year-old male with past medical history of CAD, DM, ESRD on HD MWF (last dialyzed Monday), hyperparathyroidism, hypertension, CVA, renal cell carcinoma status post right nephrectomy, recently diagnosed left-sided renal mass concerning for malignancy and scheduled for nephrectomy in January presenting with left-sided flank pain that began 2 days ago.  It has been constant since onset.  He describes the pain as sharp.  It radiates across the abdomen.  He denies back pain.  He denies chest pain, shortness of breath, nausea, vomiting, diarrhea, fever, chills.  He makes a very scant amount of urine, typically once per month.  He did urinate last night reports seeing blood in the urine.  He is not taken any medication for his symptoms.  He denies other worsening or alleviating factors.    Review of patient's allergies indicates:   Allergen Reactions    No known allergies      Past Medical History:   Diagnosis Date    CAD (coronary artery disease), native coronary artery 11/21/2019    Cataract     Diabetes mellitus     Diabetic retinopathy     Dialysis patient     DM type 2 causing renal disease, not at goal     ESRD (end stage renal disease) started dialysis 01/2014 6/5/2014    Hyperparathyroidism, secondary renal 6/5/2014    Hypertension     NSTEMI (non-ST elevated myocardial infarction) 12/21/2013    Organ transplant candidate 6/5/2014    Pneumonia     Renal hypertension     Stroke      Past Surgical History:   Procedure Laterality Date    ANGIOGRAM, CORONARY, WITH LEFT HEART CATHETERIZATION N/A 7/1/2021    Procedure: Angiogram, Coronary, with Left Heart Cath;  Surgeon: Miki Zendejas MD;  Location: [facility] CATH LAB;  Service: Cardiology;  Laterality: N/A;    COLONOSCOPY N/A 5/3/2017    Procedure: COLONOSCOPY;  Surgeon: Rufino Carpenter MD;  Location: [facility] ENDO (4TH FLR);  Service:  Endoscopy;  Laterality: N/A;  pt states can only schedule on Wednesdays    COLONOSCOPY N/A 2/9/2021    Procedure: COLONOSCOPY;  Surgeon: Edil Wong MD;  Location: [facility] ENDO (4TH FLR);  Service: Endoscopy;  Laterality: N/A;  Dialysis MWF/ labwork day of procedure  right arm aceess  per Dr. Colin pt can hold Plavix 5 days prior see note- sm  COVID test on 2/6/21 at [facility] - sm    COLONOSCOPY N/A 2/10/2021    Procedure: COLONOSCOPY;  Surgeon: Robert Ayers MD;  Location: [facility] ENDO (4TH FLR);  Service: Endoscopy;  Laterality: N/A;  rescheduled due to poor bowel prep-BB  negative covid screening 2/6/21-BB  dialysis M-W-F-BB  okay to r/s for 2/9/21 and to hold Plavix per Dr. KIRAN Wong-BB  labs same day-BB    CORONARY STENT PLACEMENT N/A 7/25/2019    Procedure: INSERTION, STENT, CORONARY ARTERY;  Surgeon: Miki Zendejas MD;  Location: [facility] CATH LAB;  Service: Cardiology;  Laterality: N/A;    DIALYSIS FISTULA CREATION      FISTULOGRAM N/A 2/18/2019    Procedure: Fistulogram;  Surgeon: Yaneth De La Cruz MD;  Location: [facility] CATH LAB;  Service: Cardiology;  Laterality: N/A;    FISTULOGRAM Right 7/23/2019    Procedure: Fistulogram;  Surgeon: Oz Cordoba MD;  Location: [facility] CATH LAB;  Service: Cardiology;  Laterality: Right;    LAPAROSCOPIC ROBOT-ASSISTED SURGICAL REMOVAL OF KIDNEY USING DA JAIME XI Right 5/19/2022    Procedure: XI ROBOTIC NEPHRECTOMY;  Surgeon: Aidan Hendricks MD;  Location: [facility] OR [facility] FLR;  Service: Urology;  Laterality: Right;  3hrs    LEFT HEART CATHETERIZATION Left 7/25/2019    Procedure: Left heart cath;  Surgeon: Miki Zendejas MD;  Location: [facility] CATH LAB;  Service: Cardiology;  Laterality: Left;    RETINAL LASER PROCEDURE Bilateral 2018 or 2017    Dr. Rothman    VASCULAR SURGERY       Family History   Problem Relation Age of Onset    Hypertension Mother     Diabetes Mother     Cataracts Mother     Hypertension Father     Hypertension Sister     Kidney disease Brother      Hypertension Brother     Heart disease Brother     Cancer Maternal Grandfather         Colon CA    Colon cancer Neg Hx     Esophageal cancer Neg Hx     Stomach cancer Neg Hx     Rectal cancer Neg Hx     Ulcerative colitis Neg Hx     Irritable bowel syndrome Neg Hx     Crohn's disease Neg Hx     Celiac disease Neg Hx     Glaucoma Neg Hx     Macular degeneration Neg Hx      Social History     Tobacco Use    Smoking status: Never    Smokeless tobacco: Never   Substance Use Topics    Alcohol use: Not Currently     Comment: One drink a month    Drug use: No     Review of Systems   Constitutional:  Negative for fever.   HENT:  Negative for sore throat.    Respiratory:  Negative for shortness of breath.    Cardiovascular:  Negative for chest pain.   Gastrointestinal:  Positive for abdominal pain. Negative for nausea.   Genitourinary:  Positive for flank pain. Negative for dysuria.   Musculoskeletal:  Negative for back pain.   Skin:  Negative for rash.   Neurological:  Negative for weakness.   Hematological:  Does not bruise/bleed easily.     Physical Exam     Initial Vitals [11/30/22 0914]   BP Pulse Resp Temp SpO2   (!) 174/97 73 16 98.1 °F (36.7 °C) 100 %      MAP       --         Physical Exam    Nursing note and vitals reviewed.  Constitutional: He appears well-developed and well-nourished. He is not diaphoretic. No distress.   HENT:   Head: Normocephalic and atraumatic.   Mouth/Throat: Oropharynx is clear and moist.   Eyes: EOM are normal. Pupils are equal, round, and reactive to light.   Neck: Neck supple.   Normal range of motion.  Cardiovascular:  Normal rate, regular rhythm, normal heart sounds and intact distal pulses.     Exam reveals no gallop and no friction rub.       No murmur heard.  Pulmonary/Chest: Breath sounds normal. He has no wheezes. He has no rhonchi. He has no rales.   Abdominal: Abdomen is soft. Bowel sounds are normal. There is no abdominal tenderness.   There is left CVA tenderness. There is no  rebound.   Musculoskeletal:         General: Normal range of motion.      Cervical back: Normal range of motion and neck supple.     Neurological: He is alert and oriented to person, place, and time. He has normal strength. GCS score is 15. GCS eye subscore is 4. GCS verbal subscore is 5. GCS motor subscore is 6.   Skin: Skin is warm and dry. Capillary refill takes less than 2 seconds.   RUE fistula with palpable thrill    Psychiatric: He has a normal mood and affect. His behavior is normal. Judgment and thought content normal.       ED Course   Procedures  Labs Reviewed   CBC W/ AUTO DIFFERENTIAL - Abnormal; Notable for the following components:       Result Value    RBC 3.67 (*)     Hemoglobin 11.7 (*)     Hematocrit 36.1 (*)     MCH 31.9 (*)     RDW 15.0 (*)     Gran # (ANC) 8.1 (*)     Mono # 1.1 (*)     Gran % 76.4 (*)     Lymph % 10.3 (*)     All other components within normal limits   COMPREHENSIVE METABOLIC PANEL - Abnormal; Notable for the following components:    Potassium 6.1 (*)     CO2 22 (*)     BUN 67 (*)     Creatinine 13.5 (*)     Albumin 3.2 (*)     Total Bilirubin 1.1 (*)     eGFR 4.2 (*)     All other components within normal limits   ISTAT PROCEDURE - Abnormal; Notable for the following components:    POC BUN 64 (*)     POC Creatinine 15.0 (*)     POC Potassium 6.0 (*)     All other components within normal limits   MAGNESIUM   URINALYSIS, REFLEX TO URINE CULTURE   ISTAT CHEM8   POCT GLUCOSE MONITORING CONTINUOUS     EKG Readings: (Independently Interpreted)   Initial Reading: No STEMI. Rhythm: Normal Sinus Rhythm. Heart Rate: 68. Ectopy: No Ectopy. Conduction: Normal. ST Segments: Normal ST Segments. T Waves Elevated: V2, V3 and V4. Axis: Normal. Clinical Impression: Left Ventricular Hypertrophy (LDH)   Normal sinus rhythm  LVH  Increased T-wave amplitude compared to 06/13/2022   Decrease inferior and lateral T-wave and ST changes from 06/13/2022   ECG Results              EKG 12-lead (Final  result)  Result time 11/30/22 10:47:01      Final result by Interface, Lab In [facility] (11/30/22 10:47:01)                   Narrative:    Test Reason : E87.5,    Vent. Rate : 068 BPM     Atrial Rate : 068 BPM     P-R Int : 170 ms          QRS Dur : 102 ms      QT Int : 406 ms       P-R-T Axes : 069 -07 068 degrees     QTc Int : 431 ms    Normal sinus rhythm  Minimal voltage criteria for LVH, may be normal variant  Borderline Abnormal ECG  When compared with ECG of 13-JUN-2022 17:28,  Questionable change in The axis  ST no longer depressed in Lateral leads  T wave inversion no longer evident in Inferior leads  T wave inversion less evident in Lateral leads  Confirmed by JOHNY CASTREJON MD ([id number]) on 11/30/2022 10:46:49 AM    Referred By: SHY   SELF           Confirmed By:OJHNY CASTREJON MD                                  Imaging Results              X-Ray Chest PA And Lateral (Final result)  Result time 11/30/22 11:04:22      Final result by Mike George MD (11/30/22 11:04:22)                   Impression:      No acute radiographic findings in the chest.      Electronically signed by: Mike George MD  Date:    11/30/2022  Time:    11:04               Narrative:    EXAMINATION:  XR CHEST PA AND LATERAL    CLINICAL HISTORY:  Unspecified abdominal pain    TECHNIQUE:  PA and lateral views of the chest were performed.    COMPARISON:  10/20/2022    FINDINGS:  The lungs are clear, with normal appearance of pulmonary vasculature and no pleural effusion or pneumothorax.    The cardiac silhouette is stable in size.  There is coronary atherosclerosis.  The hilar and mediastinal contours are unremarkable.    Visualized osseous structures show no acute abnormalities.                                       CT Abdomen Pelvis  Without Contrast (Final result)  Result time 11/30/22 11:52:18      Final result by Gregg Todd Jr., MD (11/30/22 11:52:18)                   Impression:      Postoperative changes of right  nephrectomy.  Innumerable simple and complex cysts in left kidney, consistent with polycystic kidney disease.    Increased size of a heterogeneous left upper pole lesion which appeared as a simple cyst on prior CT, likely a hemorrhagic cyst.    Multiple punctate calcifications in the left kidney, majority are vascular calcifications though parenchymal/cyst calcifications not excluded.    Extensive vascular calcifications.    Electronically signed by resident: Susanne Keller  Date:    11/30/2022  Time:    10:44    Electronically signed by: Gregg Todd MD  Date:    11/30/2022  Time:    11:52               Narrative:    EXAMINATION:  CT ABDOMEN PELVIS WITHOUT CONTRAST    CLINICAL HISTORY:  Flank pain, kidney stone suspected;    TECHNIQUE:  Axial images of the abdomen and pelvis were acquired without the use of IV contrast.  Coronal and sagittal reconstructions were also obtained.    COMPARISON:  MRI abdomen 11/22/2022, CT abdomen pelvis 01/04/2022    FINDINGS:  Heart: Normal in size.  Small volume pericardial effusion. Multivessel calcific coronary atherosclerosis versus stents.    Lungs: Bibasilar subsegmental atelectasis.  The visualized lungs are otherwise clear.    Liver: Normal in size and contour.  No focal hepatic lesion.    Gallbladder: No calcified gallstones.    Bile Ducts: No evidence of dilated ducts.    Pancreas: No mass or peripancreatic fat stranding.    Spleen: Unremarkable.    Stomach and duodenum: Unremarkable.    Adrenals: Unremarkable.    Kidneys/ Ureters: Postoperative changes of right nephrectomy.  There are innumerable hyperdense and hypodense lesions in the left kidney, consistent with polycystic kidney disease.  Left kidney measures approximately 16.4 cm (coronal series 601, image 79), increased in size from prior.  There is a heterogeneous left upper pole lesion measuring 5.2 x 4.8 cm, increased in size and previously appeared as a simple cyst, likely representing hemorrhagic  cyst.    There are multiple punctate calcifications, likely vascular calcifications, though small intrarenal stones are difficult to exclude.  No hydronephrosis.  No ureteral dilatation.    Bladder: Nondistended.    Reproductive organs: Unremarkable.    Bowel/Mesentery: Small bowel is normal in caliber with no evidence of obstruction. No evidence of inflammation or wall thickening.  Colon demonstrates no focal wall thickening.    Peritoneum: No intraperitoneal free air or fluid.    Lymph nodes: No retroperitoneal lymphadenopathy.    Vasculature: No aneurysm. Extensive calcific atherosclerosis.    Abdominal wall:  Unremarkable.    Bones: No acute fracture. No suspicious osseous lesions.                                    X-Rays:   Independently Interpreted Readings:   Chest X-Ray: Normal heart size.  No infiltrates.  No acute abnormalities.   Medications   0.9%  NaCl infusion ( Intravenous New Bag 11/30/22 1447)   amLODIPine tablet 10 mg (has no administration in time range)   aspirin EC tablet 81 mg (has no administration in time range)   carvediloL tablet 25 mg (has no administration in time range)   cinacalcet tablet 90 mg (has no administration in time range)   morphine injection 4 mg (4 mg Intravenous Given 11/30/22 1032)   acetaminophen tablet 650 mg (650 mg Oral Given 11/30/22 1033)   albuterol sulfate nebulizer solution 10 mg (10 mg Nebulization Given 11/30/22 1048)   calcium gluconate 1 g in NS IVPB (premixed) (0 g Intravenous Stopped 11/30/22 1356)   insulin regular injection 5 Units 0.05 mL (5 Units Intravenous Given 11/30/22 1345)   sodium zirconium cyclosilicate packet 10 g (10 g Oral Given 11/30/22 1344)   dextrose 10% bolus 250 mL (0 mLs Intravenous Stopped 11/30/22 1350)     Medical Decision Making:   History:   Old Medical Records: I decided to obtain old medical records.  Initial Assessment:   Emergent evaluation of a 47 y.o. male presenting to the emergency department complaining of left-sided  flank pain that has been ongoing for the last 2 days. Patient is afebrile, hemodynamically stable, and non toxic appearing.  Will order labs, imaging, analgesia.    Differential Diagnosis:   Differential diagnosis includes but isn't limited to pyelonephritis, UTI, malignancy, ureterolithiasis.  Independently Interpreted Test(s):   I have ordered and independently interpreted X-rays - see prior notes.  I have ordered and independently interpreted EKG Reading(s) - see prior notes  Clinical Tests:   Lab Tests: Ordered and Reviewed  Radiological Study: Ordered and Reviewed  Medical Tests: Ordered and Reviewed  ED Management:  No severe hematologic derangements.  H/H increased from prior.  Potassium of 6.1.  Labs are consistent with end-stage renal disease.  Will shift.  CT reveals increased size of heterogeneous left upper pole lesion which appeared as a simple cyst on prior CT, likely a hemorrhagic cyst.  Will discuss with Urology as patient is planned to have a partial nephrectomy in January.    Will admit to medicine for further workup and treatment.  He will need dialysis.  Will consult Nephrology.  He is agreeable with plan.  The patient's history, physical exam, and plan of care was discussed with and agreed upon with my supervising physician.               Attending Attestation:   Physician Attestation Statement for Resident:  As the supervising MD   Physician Attestation Statement: I have personally seen and examined this patient.   I agree with the above history.  -:   As the supervising MD I agree with the above PE.     As the supervising MD I agree with the above treatment, course, plan, and disposition.    I have reviewed and agree with the residents interpretation of the following: lab data, x-rays, EKG, CT scans and rhythm strips.  I have reviewed the following: old records at this facility, old EKGs, old CTs, EKG reports and old x-rays.        Attending Critical Care:   Critical Care Times:   Direct  Patient Care (initial evaluation, reassessments, and time considering the case)................................................................10 minutes.   Additional History from reviewing old medical records or taking additional history from the family, EMS, PCP, etc.......................10 minutes.   Ordering, Reviewing, and Interpreting Diagnostic Studies...............................................................................................................5 minutes.   Documentation..................................................................................................................................................................................5 minutes.   Consultation with other Physicians. .................................................................................................................................................10 minutes.   ==============================================================  Total Critical Care Time - exclusive of procedural time: 40 minutes.  ==============================================================  Critical care was necessary to treat or prevent imminent or life-threatening deterioration of the following conditions: metabolic crisis and renal failure.   The following critical care procedures were done by me (see procedure notes): blood draw for specimens and pulse oximetry.   Critical care was time spent personally by me on the following activities: obtaining history from patient or relative, examination of patient, review of old charts, ordering lab, x-rays, and/or EKG, development of treatment plan with patient or relative, ordering and performing treatments and interventions, evaluation of patient's response to treatment, discussion with consultants, discussions with primary provider, interpretation of cardiac measurements and re-evaluation of patient's conition.   Critical Care Condition: potentially life-threatening       Attending ED Notes:    Presentation consistent with acute on chronic renal failure.  Worsening of previously seen renal mass.  Significant hyperkalemia with EKG changes.  Plan for admission with Nephrology, Urology following.    ED Course as of 11/30/22 1632   Wed Nov 30, 2022   1015 WBC: 10.63 [JM]   1015 Hemoglobin(!): 11.7 [JM]   1015 Hematocrit(!): 36.1 [JM]   1015 Platelets: 271 [JM]   1041 Potassium(!): 6.1 [JM]   1042 BUN(!): 67 [JM]   1042 Creatinine(!): 13.5 [JM]   1042 Magnesium: 1.9 [JM]      ED Course User Index  [JM] Era Katz PA-C                 Clinical Impression:   Final diagnoses:  [R10.9] Flank pain  [E87.5] Hyperkalemia (Primary)  [N28.1] Renal cyst        ED Disposition Condition    Observation Stable                Era Katz PA-C  11/30/22 1632       Gregg Camejo MD  11/30/22 1642       Gregg Camejo MD  12/01/22 1639

## 2022-12-01 VITALS
TEMPERATURE: 98 F | WEIGHT: 165 LBS | DIASTOLIC BLOOD PRESSURE: 88 MMHG | RESPIRATION RATE: 18 BRPM | HEART RATE: 97 BPM | OXYGEN SATURATION: 98 % | BODY MASS INDEX: 25.9 KG/M2 | HEIGHT: 67 IN | SYSTOLIC BLOOD PRESSURE: 131 MMHG

## 2022-12-01 PROBLEM — D50.9 IRON DEFICIENCY ANEMIA: Status: RESOLVED | Noted: 2017-04-05 | Resolved: 2022-12-01

## 2022-12-01 LAB
ALBUMIN SERPL BCP-MCNC: 3.1 G/DL (ref 3.5–5.2)
ALP SERPL-CCNC: 77 U/L (ref 55–135)
ALT SERPL W/O P-5'-P-CCNC: 18 U/L (ref 10–44)
ANION GAP SERPL CALC-SCNC: 12 MMOL/L (ref 8–16)
AST SERPL-CCNC: 12 U/L (ref 10–40)
BASOPHILS # BLD AUTO: 0.08 K/UL (ref 0–0.2)
BASOPHILS NFR BLD: 0.7 % (ref 0–1.9)
BILIRUB SERPL-MCNC: 0.7 MG/DL (ref 0.1–1)
BUN SERPL-MCNC: 44 MG/DL (ref 6–20)
CALCIUM SERPL-MCNC: 8.7 MG/DL (ref 8.7–10.5)
CHLORIDE SERPL-SCNC: 99 MMOL/L (ref 95–110)
CO2 SERPL-SCNC: 21 MMOL/L (ref 23–29)
CREAT SERPL-MCNC: 10.7 MG/DL (ref 0.5–1.4)
DIFFERENTIAL METHOD: ABNORMAL
EOSINOPHIL # BLD AUTO: 0.2 K/UL (ref 0–0.5)
EOSINOPHIL NFR BLD: 2 % (ref 0–8)
ERYTHROCYTE [DISTWIDTH] IN BLOOD BY AUTOMATED COUNT: 14.9 % (ref 11.5–14.5)
EST. GFR  (NO RACE VARIABLE): 5.5 ML/MIN/1.73 M^2
GLUCOSE SERPL-MCNC: 110 MG/DL (ref 70–110)
HCT VFR BLD AUTO: 37.1 % (ref 40–54)
HGB BLD-MCNC: 12.1 G/DL (ref 14–18)
IMM GRANULOCYTES # BLD AUTO: 0.02 K/UL (ref 0–0.04)
IMM GRANULOCYTES NFR BLD AUTO: 0.2 % (ref 0–0.5)
LYMPHOCYTES # BLD AUTO: 1.6 K/UL (ref 1–4.8)
LYMPHOCYTES NFR BLD: 14.7 % (ref 18–48)
MAGNESIUM SERPL-MCNC: 1.8 MG/DL (ref 1.6–2.6)
MCH RBC QN AUTO: 32.1 PG (ref 27–31)
MCHC RBC AUTO-ENTMCNC: 32.6 G/DL (ref 32–36)
MCV RBC AUTO: 98 FL (ref 82–98)
MONOCYTES # BLD AUTO: 1.2 K/UL (ref 0.3–1)
MONOCYTES NFR BLD: 10.8 % (ref 4–15)
NEUTROPHILS # BLD AUTO: 7.7 K/UL (ref 1.8–7.7)
NEUTROPHILS NFR BLD: 71.6 % (ref 38–73)
NRBC BLD-RTO: 0 /100 WBC
PLATELET # BLD AUTO: 256 K/UL (ref 150–450)
PMV BLD AUTO: 10 FL (ref 9.2–12.9)
POCT GLUCOSE: 151 MG/DL (ref 70–110)
POTASSIUM SERPL-SCNC: 5.7 MMOL/L (ref 3.5–5.1)
PROT SERPL-MCNC: 7.9 G/DL (ref 6–8.4)
RBC # BLD AUTO: 3.77 M/UL (ref 4.6–6.2)
SODIUM SERPL-SCNC: 132 MMOL/L (ref 136–145)
WBC # BLD AUTO: 10.8 K/UL (ref 3.9–12.7)

## 2022-12-01 PROCEDURE — 90935 HEMODIALYSIS ONE EVALUATION: CPT | Mod: NTX,,, | Performed by: NURSE PRACTITIONER

## 2022-12-01 PROCEDURE — 99217 PR OBSERVATION CARE DISCHARGE: CPT | Mod: NTX,,, | Performed by: HOSPITALIST

## 2022-12-01 PROCEDURE — 25000003 PHARM REV CODE 250: Mod: NTX | Performed by: INTERNAL MEDICINE

## 2022-12-01 PROCEDURE — 99217 PR OBSERVATION CARE DISCHARGE: ICD-10-PCS | Mod: NTX,,, | Performed by: HOSPITALIST

## 2022-12-01 PROCEDURE — 80053 COMPREHEN METABOLIC PANEL: CPT | Mod: NTX | Performed by: HOSPITALIST

## 2022-12-01 PROCEDURE — G0257 UNSCHED DIALYSIS ESRD PT HOS: HCPCS | Mod: NTX

## 2022-12-01 PROCEDURE — 85025 COMPLETE CBC W/AUTO DIFF WBC: CPT | Mod: NTX | Performed by: HOSPITALIST

## 2022-12-01 PROCEDURE — 90935 PR HEMODIALYSIS, ONE EVALUATION: ICD-10-PCS | Mod: NTX,,, | Performed by: NURSE PRACTITIONER

## 2022-12-01 PROCEDURE — 83735 ASSAY OF MAGNESIUM: CPT | Mod: NTX | Performed by: HOSPITALIST

## 2022-12-01 PROCEDURE — G0378 HOSPITAL OBSERVATION PER HR: HCPCS | Mod: NTX

## 2022-12-01 PROCEDURE — 25000003 PHARM REV CODE 250: Mod: NTX | Performed by: HOSPITALIST

## 2022-12-01 RX ORDER — HYDRALAZINE HYDROCHLORIDE 25 MG/1
25 TABLET, FILM COATED ORAL EVERY 8 HOURS
Qty: 90 TABLET | Refills: 11 | Status: SHIPPED | OUTPATIENT
Start: 2022-12-01 | End: 2022-12-01 | Stop reason: SDUPTHER

## 2022-12-01 RX ORDER — OXYCODONE AND ACETAMINOPHEN 5; 325 MG/1; MG/1
1 TABLET ORAL EVERY 4 HOURS PRN
Status: DISCONTINUED | OUTPATIENT
Start: 2022-12-01 | End: 2022-12-01 | Stop reason: HOSPADM

## 2022-12-01 RX ORDER — HYDRALAZINE HYDROCHLORIDE 25 MG/1
25 TABLET, FILM COATED ORAL EVERY 8 HOURS
Status: DISCONTINUED | OUTPATIENT
Start: 2022-12-01 | End: 2022-12-01 | Stop reason: HOSPADM

## 2022-12-01 RX ORDER — HYDRALAZINE HYDROCHLORIDE 25 MG/1
25 TABLET, FILM COATED ORAL EVERY 8 HOURS
Qty: 90 TABLET | Refills: 11 | Status: ON HOLD | OUTPATIENT
Start: 2022-12-01 | End: 2023-11-29 | Stop reason: SDUPTHER

## 2022-12-01 RX ORDER — SODIUM CHLORIDE 9 MG/ML
INJECTION, SOLUTION INTRAVENOUS ONCE
Status: DISCONTINUED | OUTPATIENT
Start: 2022-12-01 | End: 2022-12-01 | Stop reason: HOSPADM

## 2022-12-01 RX ADMIN — HYDRALAZINE HYDROCHLORIDE 25 MG: 25 TABLET, FILM COATED ORAL at 01:12

## 2022-12-01 RX ADMIN — AMLODIPINE BESYLATE 10 MG: 10 TABLET ORAL at 08:12

## 2022-12-01 RX ADMIN — HYDRALAZINE HYDROCHLORIDE 25 MG: 25 TABLET, FILM COATED ORAL at 07:12

## 2022-12-01 RX ADMIN — ASPIRIN 81 MG: 81 TABLET, COATED ORAL at 08:12

## 2022-12-01 RX ADMIN — CARVEDILOL 25 MG: 12.5 TABLET, FILM COATED ORAL at 08:12

## 2022-12-01 NOTE — PROGRESS NOTES
Hemodialysis treatment completed.    Treatment time received: 3 hours    Net fluid removed: 3 liters    Tolerated Treatment well. VSS. No acute distress.    Needles X2 removed and site de-accessed.  Pressure held till hemostasis obtained.  Placed gauze and tape dressing to site.  Fistual with continued bruit and thrill post treatment.  Report given as documented in PER Handoff on Doc Flowsheet  Refer to flowsheet and MAR for details.  Patient transported back in wheelchair from Dialysis to primary unit.

## 2022-12-01 NOTE — PROGRESS NOTES
OBS ESRD MWF (off day) HD initiated to RLA fistula.  Tolerated well.      Pt arrived to [facility] in a wheelchair.

## 2022-12-01 NOTE — ED NOTES
"  Assessment & Plan     Pain of toe of right foot  Potentially this is gout of the right fifth toe.  It is not red hot and inflamed but it is quite tender to touch.  There does appear to be an area of increased pressure over the DIP region.  Have her start with some ibuprofen and evaluate the labs as noted below.  Follow-up based on results.  - Uric acid  - Comprehensive metabolic panel  - CBC with platelets and differential  - ibuprofen (ADVIL/MOTRIN) 600 MG tablet; Take 1 tablet (600 mg) by mouth every 6 hours as needed for moderate pain    Return in about 1 week (around 1/21/2021) for recheck of current condition, if symptoms do not improve.    CHIARA Marks [facility] OLIVIA Abbott is a 81 year old who presents to clinic today for the following health issues     HPI       Concern - pain in right little toe  Onset: a couple of weeks  Description: tenderness, redness, swelling  Intensity: moderate, severe  Progression of Symptoms:  same  Accompanying Signs & Symptoms: Difficulty walking   Previous history of similar problem: none   Precipitating factors:        Worsened by: walking  Alleviating factors:        Improved by: Aspercreme   Therapies tried and outcome: Aspercreme, badaids, Ibuprofen     Review of Systems   Constitutional, HEENT, cardiovascular, pulmonary, GI, , musculoskeletal, neuro, skin, endocrine and psych systems are negative, except as otherwise noted.      Objective    /72   Pulse 70   Temp 98.4  F (36.9  C) (Temporal)   Ht 1.575 m (5' 2\")   Wt 63.5 kg (140 lb)   LMP  (LMP Unknown)   SpO2 98%   BMI 25.61 kg/m    Body mass index is 25.61 kg/m .  Physical Exam   GENERAL: healthy, alert and no distress  RESP: lungs clear to auscultation - no rales, rhonchi or wheezes  CV: regular rate and rhythm, normal S1 S2, no S3 or S4, no murmur, click or rub, no peripheral edema and peripheral pulses strong  MS: Grossly within normal limits with " Report called to PORTIA Cortez. Transport placed.    exception of the right fifth toe.  Area of pressure noted over the DIP joint.  Mild erythema noted.  Mild warmth noted to the entire toe.  Tenderness with range of motion is present on exam.  She denies any trauma.  She states she has broken this toe before and we discussed x-rays but she declines.  SKIN: no suspicious lesions or rashes other than erythema to the right fifth toe.  NEURO: Normal strength and tone, mentation intact and speech normal  PSYCH: anxious and fatigued    No results found for this or any previous visit (from the past 24 hour(s)).

## 2022-12-01 NOTE — DISCHARGE SUMMARY
[facility] - Emergency Dept  Hospital Medicine  Discharge Summary      Patient Name: Haroldo Dickinson Sr.  MRN: [id number]  SADIE: [id number]  Patient Class: OP- Observation  Admission Date: 11/30/2022  Hospital Length of Stay: 0 days  Discharge Date and Time:  12/01/2022 7:34 AM  Attending Physician: Jameson Ureña MD   Discharging Provider: Jameson Ureña MD  Primary Care Provider: Tess Ledbetter MD  Hospital Medicine Team: [facility] Jmaeson Ureña MD  Primary Care Team: [facility]    HPI:   47-year-old male with past medical history of CAD, DM, ESRD on HD MWF (last dialyzed Monday), hyperparathyroidism, hypertension, CVA, renal cell carcinoma status post right nephrectomy, recently diagnosed left-sided renal mass concerning for malignancy and scheduled for nephrectomy in January presenting with left-sided flank pain that began 2 days ago.  It has been constant since onset.  He describes the pain as sharp.  It radiates across the abdomen.  He denies back pain.  He denies chest pain, shortness of breath, nausea, vomiting, diarrhea, fever, chills.  He makes a very scant amount of urine, typically once per month.  He did urinate last night reports seeing blood in the urine.  He is not taken any medication for his symptoms.  He denies other worsening or alleviating factors.    Seen by urology in ER, Nephrectomy schedule for 01/05/2023, no further recs. Also taken for urgent HD.      * No surgery found *      Hospital Course:   12/1  K 6.1 s/p HD this AM. SBP improved to 170s. presented to the ED with  left flank pain following HD. Urology consulted for known left renal mass and small amount of blood at the meatus. Anuric,   s/p right robotic nephrectomy on 5/19/22 for  renal cell carcinoma.  MRI  abdomen 11/22  - no evidence of residual disease s/p right nephrectomy. but with 2 indeterminate left renal lesions. 4 cm and 3 cm. Plan for left robotic nephrectomy was decided for 1/5/23.No acute urologic  intervention. on pecocet PRN for pain . k 5.7 . HD today. Ok to discharge home to resume HD in AM per nephrology           Goals of Care Treatment Preferences:  Code Status: Full Code      Consults:   Consults (From admission, onward)          Status Ordering Provider     Inpatient consult to Nephrology  Once        Provider:  (Not yet assigned)    Completed MANDEEP WRIGHT     Inpatient consult to Urology  Once        Provider:  (Not yet assigned)    Completed MANDEEP WRIGHT             Assessment/Plan:      * Hyperkalemia     K  at        Recent Labs   Lab 11/30/22  0951 12/01/22  0748   K 6.1* 5.7*   12/1  K 6.1 s/p HD this AM.   HD today. Ok to discharge home to resume HD in AM per nephrology            Chronic kidney disease-mineral and bone disorder  as above        Renal mass  Nephrectomy planned for 1/05/2023  No futher w/u needed per urology  12/1   presented to the ED with  left flank pain following HD. Urology consulted for known left renal mass and small amount of blood at the meatus. Anuric,   s/p right robotic nephrectomy on 5/19/22 for  renal cell carcinoma.  MRI  abdomen 11/22  - no evidence of residual disease s/p right nephrectomy. but with 2 indeterminate left renal lesions. 4 cm and 3 cm. Plan for left robotic nephrectomy was decided for 1/5/23.No acute urologic intervention.         ESRD (end stage renal disease)  nephrology following for HD        Essential hypertension  C/W home medicines  Assess after dialysis   12/1 uncontrolled on admission .  SBP improved to 170s. continue amlodipine and coreg .started on hydralazine 25mg TID     End stage renal failure on dialysis  HD on M/W/F  Last HD on Wed  Will have dialysis today  Nephrology consult        Anemia     Patient's with Normocytic anemia.. Hemoglobin stable. Etiology likely due to chronic disease .      Current CBC reviewed-  Recent Labs   Lab 11/30/22  0951   HGB 11.7*      Monitor CBC and transfuse if H/H drops below 7/21.   C/W iron  supplement      Final Active Diagnoses:    Diagnosis Date Noted POA    PRINCIPAL PROBLEM:  Hyperkalemia [E87.5] 11/30/2022 Unknown    Chronic kidney disease-mineral and bone disorder [N18.9, E83.9, M89.9] 06/14/2022 Yes    Renal mass [N28.89] 05/19/2022 Yes    ESRD (end stage renal disease) [N18.6] 05/26/2020 Yes    Essential hypertension [I10] 11/21/2019 Yes    End stage renal failure on dialysis [N18.6, Z99.2] 06/05/2014 Not Applicable    Anemia [D64.9] 12/16/2013 Yes      Problems Resolved During this Admission:    Diagnosis Date Noted Date Resolved POA    Iron deficiency anemia [D50.9] 04/05/2017 12/01/2022 Yes       Medications:  Reconciled Home Medications:      Medication List        Start taking these medications      hydrALAZINE 25 MG tablet  Commonly known as: APRESOLINE  Take 1 tablet (25 mg total) by mouth every 8 (eight) hours. for SBP > 140mm HG            Continue taking these medications      amLODIPine 10 MG tablet  Commonly known as: NORVASC  Take 1 tablet (10 mg total) by mouth once daily.     aspirin 81 MG EC tablet  Commonly known as: ECOTRIN  Take 1 tablet (81 mg total) by mouth once daily.     AURYXIA 210 mg iron Tab  Generic drug: ferric citrate  Take 420 mg by mouth 3 (three) times daily with meals. Take 1 with snacks     carvediloL 25 MG tablet  Commonly known as: COREG  TAKE 1 TABLET BY MOUTH TWICE A DAY WITH FOOD            Stop taking these medications      losartan 100 MG tablet  Commonly known as: COZAAR                Discharged Condition: fair    Disposition: Home or Self Care          Follow Up:     Patient Instructions:   No discharge procedures on file.    Laboratory/Diagnostic Data:    CBC/Anemia Labs: Coags:    Recent Labs   Lab 11/30/22  0951 11/30/22  0957 12/01/22  0748   WBC 10.63  --  10.80   HGB 11.7*  --  12.1*   HCT 36.1* 38 37.1*     --  256   MCV 98  --  98   RDW 15.0*  --  14.9*    No results for input(s): PT, INR, APTT in the last 168 hours.     Chemistries:  ABG:   Recent Labs   Lab 11/30/22  0951 12/01/22  0748    132*   K 6.1* 5.7*    99   CO2 22* 21*   BUN 67* 44*   CREATININE 13.5* 10.7*   CALCIUM 9.6 8.7   PROT 7.7 7.9   BILITOT 1.1* 0.7   ALKPHOS 76 77   ALT 18 18   AST 12 12   MG 1.9 1.8    No results for input(s): PH, PCO2, PO2, HCO3, POCSATURATED, BE in the last 168 hours.     POCT Glucose: HbA1c:    Recent Labs   Lab 11/30/22  1348 11/30/22  1713   POCTGLUCOSE 151* 78    Hemoglobin A1C   Date Value Ref Range Status   05/12/2022 4.7 4.0 - 5.6 % Final     Comment:     ADA Screening Guidelines:  5.7-6.4%  Consistent with prediabetes  >or=6.5%  Consistent with diabetes    High levels of fetal hemoglobin interfere with the HbA1C  assay. Heterozygous hemoglobin variants (HbS, HgC, etc)do  not significantly interfere with this assay.   However, presence of multiple variants may affect accuracy.     06/05/2014 4.4 (L) 4.5 - 6.2 % Final   12/16/2013 5.9 4.5 - 6.2 % Final        Cardiac Enzymes: Ejection Fractions:    No results for input(s): CPK, CPKMB, MB, TROPONINI in the last 72 hours. EF   Date Value Ref Range Status   10/20/2022 60 % Final   07/15/2021 55 % Final          No results for input(s): COLORU, APPEARANCEUA, PHUR, SPECGRAV, PROTEINUA, GLUCUA, KETONESU, BILIRUBINUA, OCCULTUA, NITRITE, UROBILINOGEN, LEUKOCYTESUR, RBCUA, WBCUA, BACTERIA, SQUAMEPITHEL, HYALINECASTS in the last 48 hours.    Invalid input(s): WRIGHTSUR    Lactate (Lactic Acid) (mmol/L)   Date Value   12/17/2013 0.7   12/16/2013 0.8   12/15/2013 1.6     BNP (pg/mL)   Date Value   09/21/2015 26   12/16/2013 1,496 (H)   12/15/2013 1,036 (H)     No results found for: CRP, SEDRATE  No results found for: DDIMER  Ferritin (ng/mL)   Date Value   12/16/2013 374 (H)     LD (U/L)   Date Value   12/17/2013 477 (H)     Troponin I (ng/mL)   Date Value   06/14/2022 0.326 (H)   06/13/2022 0.184 (H)   06/13/2022 0.141 (H)   09/22/2015 0.047 (H)   09/21/2015 0.049 (H)   12/17/2013 0.564 (H)    12/16/2013 0.485 (H)   12/16/2013 0.403 (H)     CPK (U/L)   Date Value   12/16/2013 490 (H)     No results found. However, due to the size of the patient record, not all encounters were searched. Please check Results Review for a complete set of results.  SARS-CoV2 (COVID-19) Qualitative PCR (no units)   Date Value   02/06/2021 Not Detected   06/20/2020 Not Detected   05/25/2020 Not Detected     POC Rapid COVID (no units)   Date Value   06/13/2022 Negative   08/11/2021 Positive (A)       Microbiology labs for the last week  Microbiology Results (last 7 days)       ** No results found for the last 168 hours. **              Reviewed and noted in plan where applicable- Please see chart for full lab data.    Lines/Drains:       Peripheral IV - Single Lumen 11/30/22 0951 20 G Anterior;Left Forearm (Active)   Site Assessment Clean;Dry;Intact 11/30/22 0951   Dressing Status Clean;Dry;Intact 11/30/22 0951   Number of days: 0            Hemodialysis AV Fistula Right forearm (Active)   Needle Size 15ga 11/30/22 1441   Site Assessment Clean;Dry;Intact;No redness;No swelling 11/30/22 1800   Patency Present;Thrill;Bruit 11/30/22 1800   Status Deaccessed 11/30/22 1800   Flows Good 11/30/22 1441   Dressing Intervention First dressing 11/30/22 1800   Dressing Status Clean;Dry;Intact 11/30/22 1800   Site Condition No complications 11/30/22 1800   Dressing Gauze 11/30/22 1800   Number of days:        Imaging  ECG Results              EKG 12-lead (Final result)  Result time 11/30/22 10:47:01      Final result by Interface, Lab In [facility] (11/30/22 10:47:01)               Narrative:    Test Reason : E87.5,    Vent. Rate : 068 BPM     Atrial Rate : 068 BPM     P-R Int : 170 ms          QRS Dur : 102 ms      QT Int : 406 ms       P-R-T Axes : 069 -07 068 degrees     QTc Int : 431 ms    Normal sinus rhythm  Minimal voltage criteria for LVH, may be normal variant  Borderline Abnormal ECG  When compared with ECG of 13-JUN-2022  17:28,  Questionable change in The axis  ST no longer depressed in Lateral leads  T wave inversion no longer evident in Inferior leads  T wave inversion less evident in Lateral leads  Confirmed by JOHNY CASTREJON MD ([id number]) on 11/30/2022 10:46:49 AM    Referred By: [email or web address]   SELF           Confirmed By:JOHNY CASTREJON MD                                  Results for orders placed during the hospital encounter of 10/20/22    Echo    Interpretation Summary  · The estimated ejection fraction is 60%.  · The left ventricle is normal in size with concentric hypertrophy and normal systolic function.  · Indeterminate left ventricular diastolic function.  · Normal right ventricular size with normal right ventricular systolic function.  · Mild left atrial enlargement.  · The estimated PA systolic pressure is 19 mmHg.  · Normal central venous pressure (3 mmHg).      CT Abdomen Pelvis  Without Contrast  Narrative: EXAMINATION:  CT ABDOMEN PELVIS WITHOUT CONTRAST    CLINICAL HISTORY:  Flank pain, kidney stone suspected;    TECHNIQUE:  Axial images of the abdomen and pelvis were acquired without the use of IV contrast.  Coronal and sagittal reconstructions were also obtained.    COMPARISON:  MRI abdomen 11/22/2022, CT abdomen pelvis 01/04/2022    FINDINGS:  Heart: Normal in size.  Small volume pericardial effusion. Multivessel calcific coronary atherosclerosis versus stents.    Lungs: Bibasilar subsegmental atelectasis.  The visualized lungs are otherwise clear.    Liver: Normal in size and contour.  No focal hepatic lesion.    Gallbladder: No calcified gallstones.    Bile Ducts: No evidence of dilated ducts.    Pancreas: No mass or peripancreatic fat stranding.    Spleen: Unremarkable.    Stomach and duodenum: Unremarkable.    Adrenals: Unremarkable.    Kidneys/ Ureters: Postoperative changes of right nephrectomy.  There are innumerable hyperdense and hypodense lesions in the left kidney, consistent with polycystic kidney  disease.  Left kidney measures approximately 16.4 cm (coronal series 601, image 79), increased in size from prior.  There is a heterogeneous left upper pole lesion measuring 5.2 x 4.8 cm, increased in size and previously appeared as a simple cyst, likely representing hemorrhagic cyst.    There are multiple punctate calcifications, likely vascular calcifications, though small intrarenal stones are difficult to exclude.  No hydronephrosis.  No ureteral dilatation.    Bladder: Nondistended.    Reproductive organs: Unremarkable.    Bowel/Mesentery: Small bowel is normal in caliber with no evidence of obstruction. No evidence of inflammation or wall thickening.  Colon demonstrates no focal wall thickening.    Peritoneum: No intraperitoneal free air or fluid.    Lymph nodes: No retroperitoneal lymphadenopathy.    Vasculature: No aneurysm. Extensive calcific atherosclerosis.    Abdominal wall:  Unremarkable.    Bones: No acute fracture. No suspicious osseous lesions.  Impression: Postoperative changes of right nephrectomy.  Innumerable simple and complex cysts in left kidney, consistent with polycystic kidney disease.    Increased size of a heterogeneous left upper pole lesion which appeared as a simple cyst on prior CT, likely a hemorrhagic cyst.    Multiple punctate calcifications in the left kidney, majority are vascular calcifications though parenchymal/cyst calcifications not excluded.    Extensive vascular calcifications.    Electronically signed by resident: Susanne Keller  Date:    11/30/2022  Time:    10:44    Electronically signed by: Gregg Todd MD  Date:    11/30/2022  Time:    11:52  X-Ray Chest PA And Lateral  Narrative: EXAMINATION:  XR CHEST PA AND LATERAL    CLINICAL HISTORY:  Unspecified abdominal pain    TECHNIQUE:  PA and lateral views of the chest were performed.    COMPARISON:  10/20/2022    FINDINGS:  The lungs are clear, with normal appearance of pulmonary vasculature and no pleural effusion  or pneumothorax.    The cardiac silhouette is stable in size.  There is coronary atherosclerosis.  The hilar and mediastinal contours are unremarkable.    Visualized osseous structures show no acute abnormalities.  Impression: No acute radiographic findings in the chest.    Electronically signed by: Mike George MD  Date:    11/30/2022  Time:    11:04      Imaging:  Imaging Results              X-Ray Chest PA And Lateral (Final result)  Result time 11/30/22 11:04:22      Final result by Mike George MD (11/30/22 11:04:22)               Impression:      No acute radiographic findings in the chest.      Electronically signed by: Mike George MD  Date:    11/30/2022  Time:    11:04             Narrative:    EXAMINATION:  XR CHEST PA AND LATERAL    CLINICAL HISTORY:  Unspecified abdominal pain    TECHNIQUE:  PA and lateral views of the chest were performed.    COMPARISON:  10/20/2022    FINDINGS:  The lungs are clear, with normal appearance of pulmonary vasculature and no pleural effusion or pneumothorax.    The cardiac silhouette is stable in size.  There is coronary atherosclerosis.  The hilar and mediastinal contours are unremarkable.    Visualized osseous structures show no acute abnormalities.                                     CT Abdomen Pelvis  Without Contrast (Final result)  Result time 11/30/22 11:52:18      Final result by Gregg Todd Jr., MD (11/30/22 11:52:18)               Impression:      Postoperative changes of right nephrectomy.  Innumerable simple and complex cysts in left kidney, consistent with polycystic kidney disease.    Increased size of a heterogeneous left upper pole lesion which appeared as a simple cyst on prior CT, likely a hemorrhagic cyst.    Multiple punctate calcifications in the left kidney, majority are vascular calcifications though parenchymal/cyst calcifications not excluded.    Extensive vascular calcifications.    Electronically signed by resident: Susanne  Arianna  Date:    11/30/2022  Time:    10:44    Electronically signed by: Gregg Todd MD  Date:    11/30/2022  Time:    11:52             Narrative:    EXAMINATION:  CT ABDOMEN PELVIS WITHOUT CONTRAST    CLINICAL HISTORY:  Flank pain, kidney stone suspected;    TECHNIQUE:  Axial images of the abdomen and pelvis were acquired without the use of IV contrast.  Coronal and sagittal reconstructions were also obtained.    COMPARISON:  MRI abdomen 11/22/2022, CT abdomen pelvis 01/04/2022    FINDINGS:  Heart: Normal in size.  Small volume pericardial effusion. Multivessel calcific coronary atherosclerosis versus stents.    Lungs: Bibasilar subsegmental atelectasis.  The visualized lungs are otherwise clear.    Liver: Normal in size and contour.  No focal hepatic lesion.    Gallbladder: No calcified gallstones.    Bile Ducts: No evidence of dilated ducts.    Pancreas: No mass or peripancreatic fat stranding.    Spleen: Unremarkable.    Stomach and duodenum: Unremarkable.    Adrenals: Unremarkable.    Kidneys/ Ureters: Postoperative changes of right nephrectomy.  There are innumerable hyperdense and hypodense lesions in the left kidney, consistent with polycystic kidney disease.  Left kidney measures approximately 16.4 cm (coronal series 601, image 79), increased in size from prior.  There is a heterogeneous left upper pole lesion measuring 5.2 x 4.8 cm, increased in size and previously appeared as a simple cyst, likely representing hemorrhagic cyst.    There are multiple punctate calcifications, likely vascular calcifications, though small intrarenal stones are difficult to exclude.  No hydronephrosis.  No ureteral dilatation.    Bladder: Nondistended.    Reproductive organs: Unremarkable.    Bowel/Mesentery: Small bowel is normal in caliber with no evidence of obstruction. No evidence of inflammation or wall thickening.  Colon demonstrates no focal wall thickening.    Peritoneum: No intraperitoneal free air or  fluid.    Lymph nodes: No retroperitoneal lymphadenopathy.    Vasculature: No aneurysm. Extensive calcific atherosclerosis.    Abdominal wall:  Unremarkable.    Bones: No acute fracture. No suspicious osseous lesions.                                      Follow Up Instructions:     Future Appointments   Date Time Provider Department Center   12/20/2022  9:00 AM PREOP,  UROLOGY [facility]   1/31/2023 10:30 AM Aidan Hendricks MD [facility]       Discharge Instructions:  Discharge Procedure Orders   Ambulatory referral/consult to Urology   Standing Status: Future   Referral Priority: Routine Referral Type: Consultation   Referral Reason: Specialty Services Required   Requested Specialty: Urology   Number of Visits Requested: 1       Jameson Ureña MD  Attending Staff Physician  [facility]

## 2022-12-01 NOTE — ED NOTES
Patient identifiers verified and correct for Haroldo Dickinson.   LOC: The patient is awake, alert and aware of environment with an appropriate affect, the patient is oriented x 3 and speaking appropriately.   APPEARANCE: Patient appears comfortable and in no acute distress, patient is clean and well groomed.  SKIN: The skin is warm and dry, color consistent with ethnicity, patient has normal skin turgor and moist mucus membranes, skin intact, no breakdown or bruising noted.   MUSCULOSKELETAL: Patient moving all extremities spontaneously, no swelling noted.  RESPIRATORY: Airway is open and patent, respirations are spontaneous, patient has a normal effort and rate, no accessory muscle use noted, pt placed on continuous pulse ox with O2 sats noted at 95% on room air.  CARDIAC: Pt placed on cardiac monitor. Patient has a normal rate and regular rhythm, no edema noted, capillary refill < 3 seconds.   GASTRO: Soft and non tender to palpation, no distention noted, normoactive bowel sounds present in all four quadrants. Pt states bowel movements have been regular.  : Pt denies any pain or frequency with urination.  NEURO: Pt opens eyes spontaneously, behavior appropriate to situation, follows commands, facial expression symmetrical, bilateral hand grasp equal and even, purposeful motor response noted, normal sensation in all extremities when touched with a finger.

## 2022-12-01 NOTE — ASSESSMENT & PLAN NOTE
C/W home medicines  Assess after dialysis   12/1 uncontrolled on admission .  SBP improved to 170s. continue amlodipine and coreg .started on hydralazine 25mg TID

## 2022-12-01 NOTE — PROGRESS NOTES
[facility]    Haroldo Dickinson Sr. is a 47 y.o. male currently on hemodialysis for removal of uremic toxins and volume.     Patient seen and evaluated on hemodialysis, tolerating treatment, see HD flowsheet for vitals and assessments.    No Hypotension, chest pain, shortness of breath, cramping, nausea or vomiting.      Labs have been reviewed and the dialysate bath has been adjusted.     Labs:     Recent Labs   Lab 11/30/22  0951 12/01/22  0748    132*   K 6.1* 5.7*    99   CO2 22* 21*   BUN 67* 44*   CREATININE 13.5* 10.7*   CALCIUM 9.6 8.7     Recent Labs   Lab 11/30/22  0951 11/30/22  0957 12/01/22  0748   WBC 10.63  --  10.80   HGB 11.7*  --  12.1*   HCT 36.1* 38 37.1*     --  256     Lab Results   Component Value Date    FESATURATED 17 (L) 12/16/2013    FERRITIN 374 (H) 12/16/2013        Assessment/Plan      Ultrafiltration goal: Liters: 3L. Maintain MAP > 65 mmHg.    Duration:  3.5 hrs    - Seen on dialysis today, tolerating session with current UFR, no complications.    - Potassium elevated despite HD on yesterday. K 5.7 from 6.1. Potassium bath adjusted.   - No lab stick/BP intake on access site  - Continue to monitor intake and output, daily weights   - Please avoid gadolinium, fleets, phos-based laxatives, NSAIDs  - Will follow closely and continue dialysis treatments while in-patient    Anemia  - Hgb 12.1  - EPO can be administered and dosed per his OP unit upon discharge.    BMM  - Renal diet with protein intake goal 1.5 g/kg/d  - Novasource with meals   - F/U PO4, Mg, Calcium. And albumin levels.     HTN  - BP Elevated  - Goal for BP <140mmHg SBP and <90 mmHg DBP.   - Continue home antihypertensive regimen; adjust as needed.       Avis Mancilla, GISSELL, APRN, FNP-C  Nephrology Department  Pager:  [phone]

## 2022-12-01 NOTE — ASSESSMENT & PLAN NOTE
Nephrectomy planned for 1/05/2023  No futher w/u needed per urology  12/1   presented to the ED with  left flank pain following HD. Urology consulted for known left renal mass and small amount of blood at the meatus. Anuric,   s/p right robotic nephrectomy on 5/19/22 for  renal cell carcinoma.  MRI  abdomen 11/22  - no evidence of residual disease s/p right nephrectomy. but with 2 indeterminate left renal lesions. 4 cm and 3 cm. Plan for left robotic nephrectomy was decided for 1/5/23.No acute urologic intervention.

## 2022-12-01 NOTE — ASSESSMENT & PLAN NOTE
Patient's with Normocytic anemia.. Hemoglobin stable. Etiology likely due to chronic disease .  Current CBC reviewed-  Recent Labs   Lab 11/30/22  0951   HGB 11.7*     Monitor CBC and transfuse if H/H drops below 7/21.   C/W iron supplement

## 2022-12-01 NOTE — ED NOTES
Received report from  PORTIA Yang.  The patient is awake, alert and cooperative with a calm affect, patient is aware of environment, airway is open and patent, respirations are spontaneous, normal effort and rate noted, skin warm and dry, moves all extremities well, appearance: no apparent distress noted, bed placed in low position, side rails up x 2, call light is within reach of patient, explanation of care provided to patient,  plan of care: observe and reassure, position of comfort, patient offers no complaints at this time, awaiting further MD orders and bed assignment, will continue to monitor.

## 2022-12-01 NOTE — PROGRESS NOTES
[facility] - Emergency Dept  Hospital Medicine  Progress Note    Patient Name: Haroldo Dickinson Sr.  MRN: [id number]  Patient Class: OP- Observation   Admission Date: 11/30/2022  Length of Stay: 0 days  Attending Physician: Jameson Ureña MD  Primary Care Provider: Tess Ledbetter MD        Subjective:     Principal Problem:Hyperkalemia        HPI:  47-year-old male with past medical history of CAD, DM, ESRD on HD MWF (last dialyzed Monday), hyperparathyroidism, hypertension, CVA, renal cell carcinoma status post right nephrectomy, recently diagnosed left-sided renal mass concerning for malignancy and scheduled for nephrectomy in January presenting with left-sided flank pain that began 2 days ago.  It has been constant since onset.  He describes the pain as sharp.  It radiates across the abdomen.  He denies back pain.  He denies chest pain, shortness of breath, nausea, vomiting, diarrhea, fever, chills.  He makes a very scant amount of urine, typically once per month.  He did urinate last night reports seeing blood in the urine.  He is not taken any medication for his symptoms.  He denies other worsening or alleviating factors.    Seen by urology in ER, Nephrectomy schedule for 01/05/2023, no further recs. Also taken for urgent HD.      Overview/Hospital Course:  12/1  K 6.1 s/p HD this AM. SBP improved to 170s. presented to the ED with  left flank pain following HD. Urology consulted for known left renal mass and small amount of blood at the meatus. Anuric,   s/p right robotic nephrectomy on 5/19/22 for  renal cell carcinoma.  MRI  abdomen 11/22  - no evidence of residual disease s/p right nephrectomy. but with 2 indeterminate left renal lesions. 4 cm and 3 cm. Plan for left robotic nephrectomy was decided for 1/5/23.No acute urologic intervention. on pecocet PRN for pain . k 5.7 . HD today. Ok to discharge home to resume HD in AM per nephrology              Review of Systems:   Pain scale:    Constitutional:  fever,   chills, headache, vision loss, hearing loss, weight loss, Generalized weakness, falls, loss of smell, loss of taste, poor appetite,  sore throat  Respiratory: cough, shortness of breath.   Cardiovascular: chest pain, dizziness, palpitations, orthopnea, swelling of feet, syncope  Gastrointestinal: nausea, vomiting, abdominal pain, diarrhea, black stool,  blood in stool, change in bowel habits  Genitourinary: hematuria, dysuria, urgency, frequency, left flank pain.   Integument/Breast: rash,  pruritis  Hematologic/Lymphatic: easy bruising, lymphadenopathy  Musculoskeletal: arthralgias , myalgias, back pain, neck pain, knee pain  Neurological: confusion, seizures, tremors, slurred speech  Behavioral/Psych:  depression, anxiety, auditory or visual hallucinations     OBJECTIVE:     Physical Exam:  Body mass index is 25.84 kg/m².    Constitutional: Appears well-developed and well-nourished.   Head: Normocephalic and atraumatic.   Neck: Normal range of motion. Neck supple.   Cardiovascular: Normal heart rate.  Regular heart rhythm.  Pulmonary/Chest: Effort normal.   Abdominal: No distension.  left CVA tenderness.  Musculoskeletal: Normal range of motion. No edema.   Neurological: Alert and oriented to person, place, and time.   Skin: Skin is warm and dry.   Psychiatric: Normal mood and affect. Behavior is normal.                  Vital Signs  Temp: 98.2 °F (36.8 °C) (12/01/22 1344)  Pulse: 74 (12/01/22 1344)  Resp: 17 (12/01/22 1344)  BP: (Abnormal) 141/79 (12/01/22 1344)  SpO2: 97 % (12/01/22 1344)     24 Hour VS Range    Temp:  [97.9 °F (36.6 °C)-98.2 °F (36.8 °C)]   Pulse:  [53-84]   Resp:  [17-18]   BP: (136-203)/()   SpO2:  [95 %-99 %]     Intake/Output Summary (Last 24 hours) at 12/1/2022 1431  Last data filed at 11/30/2022 1800  Gross per 24 hour   Intake 840.25 ml   Output 3600 ml   Net -2759.75 ml         I/O This Shift:  No intake/output data recorded.    Wt Readings from Last 3 Encounters:   11/30/22 74.8  kg (165 lb)   10/20/22 72.6 kg (160 lb)   10/20/22 73.9 kg (162 lb 14.7 oz)       I have personally reviewed the vitals and recorded Intake/Output     Laboratory/Diagnostic Data:    CBC/Anemia Labs: Coags:    Recent Labs   Lab 11/30/22  0951 11/30/22  0957 12/01/22  0748   WBC 10.63  --  10.80   HGB 11.7*  --  12.1*   HCT 36.1* 38 37.1*     --  256   MCV 98  --  98   RDW 15.0*  --  14.9*    No results for input(s): PT, INR, APTT in the last 168 hours.     Chemistries: ABG:   Recent Labs   Lab 11/30/22  0951 12/01/22  0748    132*   K 6.1* 5.7*    99   CO2 22* 21*   BUN 67* 44*   CREATININE 13.5* 10.7*   CALCIUM 9.6 8.7   PROT 7.7 7.9   BILITOT 1.1* 0.7   ALKPHOS 76 77   ALT 18 18   AST 12 12   MG 1.9 1.8    No results for input(s): PH, PCO2, PO2, HCO3, POCSATURATED, BE in the last 168 hours.     POCT Glucose: HbA1c:    Recent Labs   Lab 11/30/22  1348 11/30/22  1713   POCTGLUCOSE 151* 78    Hemoglobin A1C   Date Value Ref Range Status   05/12/2022 4.7 4.0 - 5.6 % Final     Comment:     ADA Screening Guidelines:  5.7-6.4%  Consistent with prediabetes  >or=6.5%  Consistent with diabetes    High levels of fetal hemoglobin interfere with the HbA1C  assay. Heterozygous hemoglobin variants (HbS, HgC, etc)do  not significantly interfere with this assay.   However, presence of multiple variants may affect accuracy.     06/05/2014 4.4 (L) 4.5 - 6.2 % Final   12/16/2013 5.9 4.5 - 6.2 % Final        Cardiac Enzymes: Ejection Fractions:    No results for input(s): CPK, CPKMB, MB, TROPONINI in the last 72 hours. EF   Date Value Ref Range Status   10/20/2022 60 % Final   07/15/2021 55 % Final          No results for input(s): COLORU, APPEARANCEUA, PHUR, SPECGRAV, PROTEINUA, GLUCUA, KETONESU, BILIRUBINUA, OCCULTUA, NITRITE, UROBILINOGEN, LEUKOCYTESUR, RBCUA, WBCUA, BACTERIA, SQUAMEPITHEL, HYALINECASTS in the last 48 hours.    Invalid input(s): [place]NAILA    Lactate (Lactic Acid) (mmol/L)   Date Value   12/17/2013  0.7   12/16/2013 0.8   12/15/2013 1.6     BNP (pg/mL)   Date Value   09/21/2015 26   12/16/2013 1,496 (H)   12/15/2013 1,036 (H)     No results found for: CRP, SEDRATE  No results found for: DDIMER  Ferritin (ng/mL)   Date Value   12/16/2013 374 (H)     LD (U/L)   Date Value   12/17/2013 477 (H)     Troponin I (ng/mL)   Date Value   06/14/2022 0.326 (H)   06/13/2022 0.184 (H)   06/13/2022 0.141 (H)   09/22/2015 0.047 (H)   09/21/2015 0.049 (H)   12/17/2013 0.564 (H)   12/16/2013 0.485 (H)   12/16/2013 0.403 (H)     CPK (U/L)   Date Value   12/16/2013 490 (H)     No results found. However, due to the size of the patient record, not all encounters were searched. Please check Results Review for a complete set of results.  SARS-CoV2 (COVID-19) Qualitative PCR (no units)   Date Value   02/06/2021 Not Detected   06/20/2020 Not Detected   05/25/2020 Not Detected     POC Rapid COVID (no units)   Date Value   06/13/2022 Negative   08/11/2021 Positive (A)       Microbiology labs for the last week  Microbiology Results (last 7 days)       ** No results found for the last 168 hours. **            Reviewed and noted in plan where applicable- Please see chart for full lab data.    Lines/Drains:       Peripheral IV - Single Lumen 11/30/22 0951 20 G Anterior;Left Forearm (Active)   Site Assessment Clean;Dry;Intact 11/30/22 0951   Dressing Status Clean;Dry;Intact 11/30/22 0951   Number of days: 0            Hemodialysis AV Fistula Right forearm (Active)   Needle Size 15ga 11/30/22 1441   Site Assessment Clean;Dry;Intact;No redness;No swelling 11/30/22 1800   Patency Present;Thrill;Bruit 11/30/22 1800   Status Deaccessed 11/30/22 1800   Flows Good 11/30/22 1441   Dressing Intervention First dressing 11/30/22 1800   Dressing Status Clean;Dry;Intact 11/30/22 1800   Site Condition No complications 11/30/22 1800   Dressing Gauze 11/30/22 1800   Number of days:        Imaging  ECG Results              EKG 12-lead (Final result)  Result  time 11/30/22 10:47:01      Final result by Interface, Lab In [facility] (11/30/22 10:47:01)               Narrative:    Test Reason : E87.5,    Vent. Rate : 068 BPM     Atrial Rate : 068 BPM     P-R Int : 170 ms          QRS Dur : 102 ms      QT Int : 406 ms       P-R-T Axes : 069 -07 068 degrees     QTc Int : 431 ms    Normal sinus rhythm  Minimal voltage criteria for LVH, may be normal variant  Borderline Abnormal ECG  When compared with ECG of 13-JUN-2022 17:28,  Questionable change in The axis  ST no longer depressed in Lateral leads  T wave inversion no longer evident in Inferior leads  T wave inversion less evident in Lateral leads  Confirmed by JOHNY CASTREJON MD ([id number]) on 11/30/2022 10:46:49 AM    Referred By: [email or web address]   SELF           Confirmed By:JOHNY CASTREJON MD                                  Results for orders placed during the hospital encounter of 10/20/22    Echo    Interpretation Summary  · The estimated ejection fraction is 60%.  · The left ventricle is normal in size with concentric hypertrophy and normal systolic function.  · Indeterminate left ventricular diastolic function.  · Normal right ventricular size with normal right ventricular systolic function.  · Mild left atrial enlargement.  · The estimated PA systolic pressure is 19 mmHg.  · Normal central venous pressure (3 mmHg).      CT Abdomen Pelvis  Without Contrast  Narrative: EXAMINATION:  CT ABDOMEN PELVIS WITHOUT CONTRAST    CLINICAL HISTORY:  Flank pain, kidney stone suspected;    TECHNIQUE:  Axial images of the abdomen and pelvis were acquired without the use of IV contrast.  Coronal and sagittal reconstructions were also obtained.    COMPARISON:  MRI abdomen 11/22/2022, CT abdomen pelvis 01/04/2022    FINDINGS:  Heart: Normal in size.  Small volume pericardial effusion. Multivessel calcific coronary atherosclerosis versus stents.    Lungs: Bibasilar subsegmental atelectasis.  The visualized lungs are otherwise clear.    Liver:  Normal in size and contour.  No focal hepatic lesion.    Gallbladder: No calcified gallstones.    Bile Ducts: No evidence of dilated ducts.    Pancreas: No mass or peripancreatic fat stranding.    Spleen: Unremarkable.    Stomach and duodenum: Unremarkable.    Adrenals: Unremarkable.    Kidneys/ Ureters: Postoperative changes of right nephrectomy.  There are innumerable hyperdense and hypodense lesions in the left kidney, consistent with polycystic kidney disease.  Left kidney measures approximately 16.4 cm (coronal series 601, image 79), increased in size from prior.  There is a heterogeneous left upper pole lesion measuring 5.2 x 4.8 cm, increased in size and previously appeared as a simple cyst, likely representing hemorrhagic cyst.    There are multiple punctate calcifications, likely vascular calcifications, though small intrarenal stones are difficult to exclude.  No hydronephrosis.  No ureteral dilatation.    Bladder: Nondistended.    Reproductive organs: Unremarkable.    Bowel/Mesentery: Small bowel is normal in caliber with no evidence of obstruction. No evidence of inflammation or wall thickening.  Colon demonstrates no focal wall thickening.    Peritoneum: No intraperitoneal free air or fluid.    Lymph nodes: No retroperitoneal lymphadenopathy.    Vasculature: No aneurysm. Extensive calcific atherosclerosis.    Abdominal wall:  Unremarkable.    Bones: No acute fracture. No suspicious osseous lesions.  Impression: Postoperative changes of right nephrectomy.  Innumerable simple and complex cysts in left kidney, consistent with polycystic kidney disease.    Increased size of a heterogeneous left upper pole lesion which appeared as a simple cyst on prior CT, likely a hemorrhagic cyst.    Multiple punctate calcifications in the left kidney, majority are vascular calcifications though parenchymal/cyst calcifications not excluded.    Extensive vascular calcifications.    Electronically signed by resident:  Susanne Keller  Date:    11/30/2022  Time:    10:44    Electronically signed by: Gregg Todd MD  Date:    11/30/2022  Time:    11:52  X-Ray Chest PA And Lateral  Narrative: EXAMINATION:  XR CHEST PA AND LATERAL    CLINICAL HISTORY:  Unspecified abdominal pain    TECHNIQUE:  PA and lateral views of the chest were performed.    COMPARISON:  10/20/2022    FINDINGS:  The lungs are clear, with normal appearance of pulmonary vasculature and no pleural effusion or pneumothorax.    The cardiac silhouette is stable in size.  There is coronary atherosclerosis.  The hilar and mediastinal contours are unremarkable.    Visualized osseous structures show no acute abnormalities.  Impression: No acute radiographic findings in the chest.    Electronically signed by: Mike George MD  Date:    11/30/2022  Time:    11:04      Labs, Imaging, EKG and Diagnostic results from 12/1/2022 were reviewed.    Medications:  Medication list was reviewed and changes noted under Assessment/Plan.  Current Facility-Administered Medications on File Prior to Encounter   Medication Dose Route Frequency Provider Last Rate Last Admin    ceFAZolin injection 2 g  2 g Intravenous On Call Procedure Michelle Spence MD        lidocaine (PF) 10 mg/ml (1%) injection 10 mg  1 mL Intradermal Once Michelle Spence MD         Current Outpatient Medications on File Prior to Encounter   Medication Sig Dispense Refill    amLODIPine (NORVASC) 10 MG tablet Take 1 tablet (10 mg total) by mouth once daily. 30 tablet 3    aspirin (ECOTRIN) 81 MG EC tablet Take 1 tablet (81 mg total) by mouth once daily. 30 tablet 11    AURYXIA 210 mg iron Tab Take 420 mg by mouth 3 (three) times daily with meals. Take 1 with snacks      carvediloL (COREG) 25 MG tablet TAKE 1 TABLET BY MOUTH TWICE A DAY WITH FOOD 180 tablet 2    losartan (COZAAR) 100 MG tablet Take 100 mg by mouth once daily.      [DISCONTINUED] sevelamer carbonate (RENVELA) 800 mg Tab Take 1 tablet (800 mg  total) by mouth 3 (three) times daily with meals. 90 tablet 3     Scheduled Medications:  sodium chloride 0.9%, , Intravenous, Once  amLODIPine, 10 mg, Oral, Daily  aspirin, 81 mg, Oral, Daily  carvediloL, 25 mg, Oral, Daily  cinacalcet, 90 mg, Oral, Daily  hydrALAZINE, 25 mg, Oral, Q8H    PRN: oxyCODONE-acetaminophen  Infusions:   Estimated Creatinine Clearance: 8.2 mL/min (A) (based on SCr of 10.7 mg/dL (H)).    Assessment/Plan:      * Hyperkalemia     K  at   Recent Labs   Lab 11/30/22  0951 12/01/22  0748   K 6.1* 5.7*   12/1  K 6.1 s/p HD this AM.   HD today. Ok to discharge home to resume HD in AM per nephrology          Chronic kidney disease-mineral and bone disorder  as above      Renal mass  Nephrectomy planned for 1/05/2023  No futher w/u needed per urology  12/1   presented to the ED with  left flank pain following HD. Urology consulted for known left renal mass and small amount of blood at the meatus. Anuric,   s/p right robotic nephrectomy on 5/19/22 for  renal cell carcinoma.  MRI  abdomen 11/22  - no evidence of residual disease s/p right nephrectomy. but with 2 indeterminate left renal lesions. 4 cm and 3 cm. Plan for left robotic nephrectomy was decided for 1/5/23.No acute urologic intervention.       ESRD (end stage renal disease)  nephrology following for HD      Essential hypertension  C/W home medicines  Assess after dialysis   12/1 uncontrolled on admission .  SBP improved to 170s. continue amlodipine and coreg .started on hydralazine 25mg TID    End stage renal failure on dialysis  HD on M/W/F  Last HD on Wed  Will have dialysis today  Nephrology consult      Anemia    Patient's with Normocytic anemia.. Hemoglobin stable. Etiology likely due to chronic disease .  Current CBC reviewed-  Recent Labs   Lab 11/30/22  0951   HGB 11.7*     Monitor CBC and transfuse if H/H drops below 7/21.   C/W iron supplement      VTE Risk Mitigation (From admission, onward)      None            Discharge  Planning   QUIN:      Code Status: Prior   Is the patient medically ready for discharge?: No    Reason for patient still in hospital (select all that apply): Treatment                     Jameson Ureña MD  Department of Hospital Medicine   [facility] [address] - Emergency Dept

## 2022-12-01 NOTE — HOSPITAL COURSE
12/1  K 6.1 s/p HD this AM. SBP improved to 170s. presented to the ED with  left flank pain following HD. Urology consulted for known left renal mass and small amount of blood at the meatus. Anuric,   s/p right robotic nephrectomy on 5/19/22 for  renal cell carcinoma.  MRI  abdomen 11/22  - no evidence of residual disease s/p right nephrectomy. but with 2 indeterminate left renal lesions. 4 cm and 3 cm. Plan for left robotic nephrectomy was decided for 1/5/23.No acute urologic intervention. on pecocet PRN for pain . k 5.7 . HD today. Ok to discharge home to resume HD in AM per nephrology

## 2022-12-02 NOTE — PROGRESS NOTES
HD txt complete.  2.5L UF removed.  Needles removed & dressing applied.  Tolerated well.    Pt transported [facility] to [facility] from [facility] in a wheelchair.

## 2022-12-05 ENCOUNTER — TELEPHONE (OUTPATIENT)
Dept: PREADMISSION TESTING | Facility: HOSPITAL | Age: 47
End: 2022-12-05
Payer: MEDICARE

## 2022-12-05 NOTE — TELEPHONE ENCOUNTER
----- Message from Yojana Myles RN sent at 11/22/2022  4:44 PM CST -----  Regarding: Dr Hendricks Surgery scheduled for Thu Jan 5, 2023 - Left robotic nephrectomy  Hi Ms Miles and Team :)    This pt is scheduled for Dr Hendricks Surgery scheduled for Thu Jan 5, 2023 - Left robotic nephrectomy.    This pt has dialysis M-W-F and is doing his pre -op in Urology for consent on   12/20/2022 Status: [facility]   Appt Time: 9:00 AM Length: 30  Visit Type: PRE-OP [[id number]      Any coordination for the pt is really appreciated.    Thank you,    PORTIA Dial/ESTEBAN Cordero.         Patient is very compliant with CPAP therapy  Patient benefits greatly from CPAP therapy  Recommend continuing CPAP therapy

## 2022-12-15 ENCOUNTER — TELEPHONE (OUTPATIENT)
Dept: PREADMISSION TESTING | Facility: HOSPITAL | Age: 47
End: 2022-12-15
Payer: MEDICARE

## 2022-12-15 DIAGNOSIS — Z01.818 PREOPERATIVE TESTING: Primary | ICD-10-CM

## 2022-12-15 DIAGNOSIS — E11.22 CONTROLLED TYPE 2 DIABETES MELLITUS WITH CHRONIC KIDNEY DISEASE, WITHOUT LONG-TERM CURRENT USE OF INSULIN, UNSPECIFIED CKD STAGE: ICD-10-CM

## 2022-12-15 NOTE — ANESTHESIA PAT ROS NOTE
12/15/2022  Haroldo Dickinson Sr. is a 47 y.o., male.      Pre-op Assessment    I have reviewed the NPO Status.   I have reviewed the Medications.     Review of Systems  Anesthesia Hx:  No problems with previous Anesthesia   History of prior surgery of interest to airway management or planning:          Denies Family Hx of Anesthesia complications.    Denies Personal Hx of Anesthesia complications.                    Social:  Non-Smoker, No Alcohol Use       Hematology/Oncology:       -- Anemia:                  Current/Recent Cancer.         surgery   Oncology Comments: S/p nephrectomy; Renal mass     EENT/Dental:  EENT/Dental Normal           Cardiovascular:  Exercise tolerance: good   Hypertension  Past MI CAD           hyperlipidemia    Arteriovenous fistula                         Pulmonary:         H/o pneumonia in past, denies any pulmonary problems.               Renal/:  Chronic Renal Disease, ESRD, renal hypertension   Dialysis M-W-F.             Hepatic/GI:        H/o colon polyps          Musculoskeletal:  Musculoskeletal Normal                Neurological:   CVA, residual symptoms        Right-sided deficits-weakness in right leg;                            Endocrine:  Diabetes, type 2   Hyperparathyroidism        Dermatological:  Skin Normal    Psych:  Psychiatric Normal                       Anesthesia Assessment: Preoperative EQUATION    Planned Procedure: Procedure(s) (LRB):  XI ROBOTIC NEPHRECTOMY (Left)  Requested Anesthesia Type:General/Regional  Surgeon: Aidan Hendricks MD  Service: Urology  Known or anticipated Date of Surgery:1/5/2023    Surgeon notes: reviewed and seen by Dr. Hendricks on 11/22/2022 and noted in chart.    Electronic QUestionnaire Assessment completed via nurse interview with patient.        Triage considerations:     The patient has no apparent active cardiac condition  (No unstable coronary Syndrome such as severe unstable angina or recent [<1 month] myocardial infarction, decompensated CHF, severe valvular   disease or significant arrhythmia)    Previous anesthesia records:No problems  05/19/2022:  Final Anesthesia Type: general        Patient location during evaluation: PACU  Patient participation: Yes- Able to Participate  Level of consciousness: awake and alert  Post-procedure vital signs: reviewed and stable  Pain management: adequate  Airway patency: patent  ANI mitigation strategies: Multimodal analgesia  PONV status at discharge: No PONV  Anesthetic complications: no      Cardiovascular status: blood pressure returned to baseline and hemodynamically stable  Respiratory status: unassisted  Hydration status: euvolemic  Follow-up not needed.    Intubation     Date/Time: 5/19/2022 1:40 PM  Performed by: Amina Aguirre MD  Authorized by: Edouard Orozco MD      Intubation:     Induction:  Intravenous    Intubated:  Postinduction    Mask Ventilation:  Easy with oral airway    Attempts:  2    Attempted By:  Resident anesthesiologist    Method of Intubation:  Direct    Blade:  Lawson 2    Laryngeal View Grade: Grade III - only epiglottis visible      Attempted By (2nd Attempt):  Resident anesthesiologist    Method of Intubation (2nd Attempt):  Video laryngoscopy    Blade (2nd Attempt):  Card 4    Laryngeal View Grade (2nd Attempt): Grade I - full view of cords      Difficult Airway Encountered?: No      Complications:  None    Airway Device:  Oral endotracheal tube    Airway Device Size:  7.5    Style/Cuff Inflation:  Cuffed (inflated to minimal occlusive pressure)    Tube secured:  23    Secured at:  The lips    Placement Verified By:  Capnometry    Complicating Factors:  None, large prominent central incisors and large/floppy epiglottis    Findings Post-Intubation:  BS equal bilateral and atraumatic/condition of teeth unchanged    Last PCP note: 3-6 months ago , seen by  "Dr. De Anda  05/12/2022, denies having PCP  Subspecialty notes: Kidney Transplant, Nephrology, Urology, Podiatry, Ophthalmology    Other important co-morbidities: DM2, HLD, HTN, and CAD, ESRD, H/o CVA       Tests already available:  Available tests,  (12/01) CBC Auto Differential, CMP, Magnesium, (11/30) Magnesium. CMP, & CBC, (11/09) HLA, (10/20) PSA, PTH, Hep B, (11/30) EKG-all available and reviewed in chart.             Instructions given. (See in Nurse's note)    Optimization:  Anesthesia Preop Clinic Assessment  Indicated    Medical Opinion Indicated       Sub-specialist consult indicated:   TBD       Plan:    Testing:  A1C, CMP, EKG, Hematology Profile, and T&S   Pre-anesthesia  visit       Visit focus:  pre-op     Consultation:IM Perioperative Hospitalist     Patient  has previously scheduled Medical Appointment 12/20: 12/29    Navigation: Tests Scheduled.              Consults scheduled.             Results will be tracked by Preop Clinic.     12/15/2022 medication instructions reviewed, need ASA guidelines. Anesthesia visit scheduled for 12/20. Need A1C and T&S. Has current CBC, CMP, and EKG. Apple Montoya RN.     12/29/2022 Patient seen in clinic by Dr. De Anda. Noted in visit note "Risk , benefits of ASA use in the perioperative period discussed I suggested to sty on aspirin 81 mg coated aspirin once a day". Optimization also noted: " Patient is optimized". T&S, A1C and CMP resulted in chart, pending review.  Suggested by Dr. De Anda "-I suggest checking the electrolytes on presentation to the surgery to ensure that the electrolytes are appropriate to proceed with surgery. I suggest checking the electrolytes closely . I suggest monitoring the fluid status so that fluid overload can be avoided . Please note that the patient may be at increased risk of bleeding from platelet dysfunction from renal failure . I suggest involving the nephrology team for on going dialysis treatment". Apple Montoya RN. "     01/03/2023 Labs reviewed by Dr. Arambula on 12/30/2022. Has EKG from 06/13/2022 in chart. EKG also done and confirmed 11/30/2022. Apple Montoya RN.

## 2022-12-15 NOTE — TELEPHONE ENCOUNTER
----- Message from Apple Montoya RN sent at 12/15/2022  1:45 PM CST -----  Surgery 01/05. Please schedule for CMP, A1c, and T&S on/after 12/22. Thanks, HV.

## 2022-12-20 DIAGNOSIS — Z76.82 ORGAN TRANSPLANT CANDIDATE: Primary | ICD-10-CM

## 2022-12-29 ENCOUNTER — OFFICE VISIT (OUTPATIENT)
Dept: INTERNAL MEDICINE | Facility: CLINIC | Age: 47
End: 2022-12-29
Payer: MEDICARE

## 2022-12-29 ENCOUNTER — LAB VISIT (OUTPATIENT)
Dept: LAB | Facility: HOSPITAL | Age: 47
End: 2022-12-29
Attending: ANESTHESIOLOGY
Payer: MEDICARE

## 2022-12-29 VITALS
DIASTOLIC BLOOD PRESSURE: 84 MMHG | SYSTOLIC BLOOD PRESSURE: 154 MMHG | RESPIRATION RATE: 18 BRPM | BODY MASS INDEX: 26.48 KG/M2 | HEIGHT: 68 IN | OXYGEN SATURATION: 97 % | TEMPERATURE: 98 F | HEART RATE: 66 BPM | WEIGHT: 174.69 LBS

## 2022-12-29 DIAGNOSIS — E11.22 CONTROLLED TYPE 2 DIABETES MELLITUS WITH CHRONIC KIDNEY DISEASE, WITHOUT LONG-TERM CURRENT USE OF INSULIN, UNSPECIFIED CKD STAGE: ICD-10-CM

## 2022-12-29 DIAGNOSIS — Z99.2 END STAGE RENAL FAILURE ON DIALYSIS: ICD-10-CM

## 2022-12-29 DIAGNOSIS — R01.1 CARDIAC MURMUR: ICD-10-CM

## 2022-12-29 DIAGNOSIS — I69.351 HEMIPARESIS AFFECTING RIGHT SIDE AS LATE EFFECT OF CEREBROVASCULAR ACCIDENT: ICD-10-CM

## 2022-12-29 DIAGNOSIS — R22.9 SOFT TISSUE SWELLING: ICD-10-CM

## 2022-12-29 DIAGNOSIS — N18.6 END STAGE RENAL FAILURE ON DIALYSIS: ICD-10-CM

## 2022-12-29 DIAGNOSIS — I10 ESSENTIAL HYPERTENSION: ICD-10-CM

## 2022-12-29 DIAGNOSIS — I69.398 ABNORMALITY OF GAIT FOLLOWING CEREBROVASCULAR ACCIDENT (CVA): ICD-10-CM

## 2022-12-29 DIAGNOSIS — Z98.61 POST PTCA: ICD-10-CM

## 2022-12-29 DIAGNOSIS — I77.0 ARTERIOVENOUS FISTULA: ICD-10-CM

## 2022-12-29 DIAGNOSIS — N18.6 CONTROLLED TYPE 2 DIABETES MELLITUS WITH CHRONIC KIDNEY DISEASE ON CHRONIC DIALYSIS, WITHOUT LONG-TERM CURRENT USE OF INSULIN: ICD-10-CM

## 2022-12-29 DIAGNOSIS — Z99.2 CONTROLLED TYPE 2 DIABETES MELLITUS WITH CHRONIC KIDNEY DISEASE ON CHRONIC DIALYSIS, WITHOUT LONG-TERM CURRENT USE OF INSULIN: ICD-10-CM

## 2022-12-29 DIAGNOSIS — E11.22 CONTROLLED TYPE 2 DIABETES MELLITUS WITH CHRONIC KIDNEY DISEASE ON CHRONIC DIALYSIS, WITHOUT LONG-TERM CURRENT USE OF INSULIN: ICD-10-CM

## 2022-12-29 DIAGNOSIS — Z01.818 PREOPERATIVE TESTING: ICD-10-CM

## 2022-12-29 DIAGNOSIS — D63.1 ANEMIA IN ESRD (END-STAGE RENAL DISEASE): ICD-10-CM

## 2022-12-29 DIAGNOSIS — I25.10 CORONARY ARTERY DISEASE INVOLVING NATIVE CORONARY ARTERY OF NATIVE HEART WITHOUT ANGINA PECTORIS: ICD-10-CM

## 2022-12-29 DIAGNOSIS — N18.6 ANEMIA IN ESRD (END-STAGE RENAL DISEASE): ICD-10-CM

## 2022-12-29 DIAGNOSIS — Z86.16 HISTORY OF COVID-19: ICD-10-CM

## 2022-12-29 DIAGNOSIS — I83.893 VARICOSE VEINS OF BILATERAL LOWER EXTREMITIES WITH OTHER COMPLICATIONS: ICD-10-CM

## 2022-12-29 DIAGNOSIS — R26.9 ABNORMALITY OF GAIT FOLLOWING CEREBROVASCULAR ACCIDENT (CVA): ICD-10-CM

## 2022-12-29 PROBLEM — I16.0 HYPERTENSIVE URGENCY: Status: RESOLVED | Noted: 2022-06-13 | Resolved: 2022-12-29

## 2022-12-29 PROBLEM — E87.5 HYPERKALEMIA: Status: RESOLVED | Noted: 2022-11-30 | Resolved: 2022-12-29

## 2022-12-29 PROBLEM — E87.70 VOLUME OVERLOAD: Status: RESOLVED | Noted: 2022-06-13 | Resolved: 2022-12-29

## 2022-12-29 LAB
ABO + RH BLD: NORMAL
ALBUMIN SERPL BCP-MCNC: 3 G/DL (ref 3.5–5.2)
ALP SERPL-CCNC: 96 U/L (ref 55–135)
ALT SERPL W/O P-5'-P-CCNC: 15 U/L (ref 10–44)
ANION GAP SERPL CALC-SCNC: 13 MMOL/L (ref 8–16)
AST SERPL-CCNC: 15 U/L (ref 10–40)
BILIRUB SERPL-MCNC: 0.9 MG/DL (ref 0.1–1)
BLD GP AB SCN CELLS X3 SERPL QL: NORMAL
BUN SERPL-MCNC: 47 MG/DL (ref 6–20)
CALCIUM SERPL-MCNC: 7.4 MG/DL (ref 8.7–10.5)
CHLORIDE SERPL-SCNC: 103 MMOL/L (ref 95–110)
CO2 SERPL-SCNC: 24 MMOL/L (ref 23–29)
CREAT SERPL-MCNC: 8.8 MG/DL (ref 0.5–1.4)
EST. GFR  (NO RACE VARIABLE): 7 ML/MIN/1.73 M^2
ESTIMATED AVG GLUCOSE: 94 MG/DL (ref 68–131)
GLUCOSE SERPL-MCNC: 90 MG/DL (ref 70–110)
HBA1C MFR BLD: 4.9 % (ref 4–5.6)
POTASSIUM SERPL-SCNC: 4.1 MMOL/L (ref 3.5–5.1)
PROT SERPL-MCNC: 7.2 G/DL (ref 6–8.4)
SODIUM SERPL-SCNC: 140 MMOL/L (ref 136–145)

## 2022-12-29 PROCEDURE — 1159F MED LIST DOCD IN RCRD: CPT | Mod: CPTII,NTX,S$GLB, | Performed by: HOSPITALIST

## 2022-12-29 PROCEDURE — 1159F PR MEDICATION LIST DOCUMENTED IN MEDICAL RECORD: ICD-10-PCS | Mod: CPTII,NTX,S$GLB, | Performed by: HOSPITALIST

## 2022-12-29 PROCEDURE — 99214 PR OFFICE/OUTPT VISIT, EST, LEVL IV, 30-39 MIN: ICD-10-PCS | Mod: NTX,S$GLB,, | Performed by: HOSPITALIST

## 2022-12-29 PROCEDURE — 4010F PR ACE/ARB THEARPY RXD/TAKEN: ICD-10-PCS | Mod: CPTII,NTX,S$GLB, | Performed by: HOSPITALIST

## 2022-12-29 PROCEDURE — 3079F DIAST BP 80-89 MM HG: CPT | Mod: CPTII,NTX,S$GLB, | Performed by: HOSPITALIST

## 2022-12-29 PROCEDURE — 3077F SYST BP >= 140 MM HG: CPT | Mod: CPTII,NTX,S$GLB, | Performed by: HOSPITALIST

## 2022-12-29 PROCEDURE — 1160F PR REVIEW ALL MEDS BY PRESCRIBER/CLIN PHARMACIST DOCUMENTED: ICD-10-PCS | Mod: CPTII,NTX,S$GLB, | Performed by: HOSPITALIST

## 2022-12-29 PROCEDURE — 3079F PR MOST RECENT DIASTOLIC BLOOD PRESSURE 80-89 MM HG: ICD-10-PCS | Mod: CPTII,NTX,S$GLB, | Performed by: HOSPITALIST

## 2022-12-29 PROCEDURE — 1160F RVW MEDS BY RX/DR IN RCRD: CPT | Mod: CPTII,NTX,S$GLB, | Performed by: HOSPITALIST

## 2022-12-29 PROCEDURE — 3077F PR MOST RECENT SYSTOLIC BLOOD PRESSURE >= 140 MM HG: ICD-10-PCS | Mod: CPTII,NTX,S$GLB, | Performed by: HOSPITALIST

## 2022-12-29 PROCEDURE — 3008F PR BODY MASS INDEX (BMI) DOCUMENTED: ICD-10-PCS | Mod: CPTII,NTX,S$GLB, | Performed by: HOSPITALIST

## 2022-12-29 PROCEDURE — 3044F PR MOST RECENT HEMOGLOBIN A1C LEVEL <7.0%: ICD-10-PCS | Mod: CPTII,NTX,S$GLB, | Performed by: HOSPITALIST

## 2022-12-29 PROCEDURE — 3066F PR DOCUMENTATION OF TREATMENT FOR NEPHROPATHY: ICD-10-PCS | Mod: CPTII,NTX,S$GLB, | Performed by: HOSPITALIST

## 2022-12-29 PROCEDURE — 99999 PR PBB SHADOW E&M-EST. PATIENT-LVL III: ICD-10-PCS | Mod: PBBFAC,TXP,, | Performed by: HOSPITALIST

## 2022-12-29 PROCEDURE — 99999 PR PBB SHADOW E&M-EST. PATIENT-LVL III: CPT | Mod: PBBFAC,TXP,, | Performed by: HOSPITALIST

## 2022-12-29 PROCEDURE — 99214 OFFICE O/P EST MOD 30 MIN: CPT | Mod: NTX,S$GLB,, | Performed by: HOSPITALIST

## 2022-12-29 PROCEDURE — 3008F BODY MASS INDEX DOCD: CPT | Mod: CPTII,NTX,S$GLB, | Performed by: HOSPITALIST

## 2022-12-29 PROCEDURE — 4010F ACE/ARB THERAPY RXD/TAKEN: CPT | Mod: CPTII,NTX,S$GLB, | Performed by: HOSPITALIST

## 2022-12-29 PROCEDURE — 80053 COMPREHEN METABOLIC PANEL: CPT | Mod: TXP | Performed by: ANESTHESIOLOGY

## 2022-12-29 PROCEDURE — 3044F HG A1C LEVEL LT 7.0%: CPT | Mod: CPTII,NTX,S$GLB, | Performed by: HOSPITALIST

## 2022-12-29 PROCEDURE — 83036 HEMOGLOBIN GLYCOSYLATED A1C: CPT | Mod: TXP | Performed by: ANESTHESIOLOGY

## 2022-12-29 PROCEDURE — 86900 BLOOD TYPING SEROLOGIC ABO: CPT | Mod: TXP | Performed by: ANESTHESIOLOGY

## 2022-12-29 PROCEDURE — 3066F NEPHROPATHY DOC TX: CPT | Mod: CPTII,NTX,S$GLB, | Performed by: HOSPITALIST

## 2022-12-29 RX ORDER — SEVELAMER CARBONATE 800 MG/1
800 TABLET, FILM COATED ORAL
COMMUNITY

## 2022-12-29 RX ORDER — CINACALCET 90 MG/1
1 TABLET, FILM COATED ORAL DAILY
COMMUNITY
Start: 2022-12-21

## 2022-12-29 NOTE — ASSESSMENT & PLAN NOTE
July 2019   As per the history obtained from him it sounds like his coronary artery disease was diagnosed based on EKG and he did not have any symptoms of coronary artery disease  He does have cardiovascular risk factors namely hypertension, diabetes, hyperlipidemia      Haroldo Dickinson has known coronary artery disease having had PCI of RCA in the past.      July 2021         The Mid RCA lesion was 100% stenosed.   The Mid LAD lesion was 90% stenosed.   The estimated blood loss was <50 mL.   There was two vessel coronary artery disease.      Despite abnormal stress would recommend proceed with transplant evaluation.     He gives a history that he was on dual antiplatelet agents treatment after his coronary intervention in 2019        Risk , benefits of ASA use in the perioperative period discussed    I suggested to y on aspirin 81 mg coated aspirin once a day.    He walks on a regular basis about half a mi a day to do groceries and other things  He does not drive    He does housework, cooking and cleaning  He does not have any exertional chest pain, chest tightness, short of breath dizziness or lightheadedness

## 2022-12-29 NOTE — ASSESSMENT & PLAN NOTE
Type 2 Diabetes Mellitus  Currently not  On treatment   Hemoglobin A1c- pending   Capillary glucose check-None        Diabetes Complications      Microvascular       known to have   Diabetes affecting the eyes, Kidneys   No Tingling numbness of hands   feet  No reported open areas on the feet   Feet care suggested      Macrovascular      Had stroke  known to have CAD  No suggestion of  lower extremity claudication        Diabetes Mellitus-I suggest monitoring the glucose in the perioperative period ( Before meals and bed time,if the patient is on oral feeds or every 6 hourly ,if the patient is NPO )  Blood glucose target in hospitalized patients is 140-180. Oral Hypoglycemic agents are generally avoided during the hospital stay . If glucose is consistently elevated ,I suggest using basal ,prandial Insulin regimen to control the glucose , as elevated glucose can be associated with adverse surgical out comes. Please consider involving Hospital Medicine or Endocrinology ,if any help is needed with Glucose control. Patient will be instructed based on the pre op clinic guidelines  about adjustment of diabetic treatment (If applicable )  considering the NPO status for Surgery       I had educated that uncontrolled DM can cause post op complications,risk of infection, wound healing problem,increased length of stay in hospital and its associated complications.I suggest exercise as much as possible and follow diabetic diet

## 2022-12-29 NOTE — ASSESSMENT & PLAN NOTE
He does have AV fistula  on the right upper extremity through which he gets dialysis    Jan 2020    Duplex imaging reveals a patent right radiocepalic AV fistula with a diameter size increase ( 3.0 mm to 6.0 mm) in the distal radial artery just proximal to the anastomosis.   These findings suggest a radial artery aneurysm. There is no evidence of a hemodynamically significant stenosis. Wall thrombus remains anteriorly along the wall of the   outflow vein. The volume flow at the mid forearm measures 1194 mL/min. Outflow vein measurements are noted.     Had Vascular surgery evaluation June 2020 and it was felt that - It was felt that  AVF has been functioning much better over the last several weeks.  Therefore, no intervention is necessary at this time.

## 2022-12-29 NOTE — ASSESSMENT & PLAN NOTE
Polycystic kidney disease with innumerable simple and complex cysts     Diabetes, HTN related      ESRD ([facility] dialysis MWF)     The surgery is scheduled for January 5th which is a Thursday and who is had dialysis done the day before surgery     -I suggest checking the electrolytes on presentation to the surgery to ensure that the electrolytes are appropriate to proceed with surgery. I suggest checking the electrolytes closely . I suggest monitoring the fluid status so that fluid overload can be avoided . Please note that the patient may be at increased risk of bleeding from platelet dysfunction from renal failure . I suggest involving the nephrology team for on going dialysis treatment        He urinates once a month a small amount  He is on a fluid restriction of 1 L of fluid in a day

## 2022-12-29 NOTE — ASSESSMENT & PLAN NOTE
He is taking amlodipine, carvedilol, hydralazine  He is taking his blood pressure medication regularly    Home BP readings -None  Dialysis BP readings per him are Pretty normal   Recent BP readings in the record-  Vitals - 1 value per visit 12/1/2022 12/15/2022 12/29/2022   SYSTOLIC 131  154   DIASTOLIC 88  84     Hypertension-  Blood pressure is acceptable . I suggest continuation of amlodipine, carvedilol, hydralazine   during the entire perioperative period. I suggest  blood pressure, electrolyte and renal function monitoring .I suggest addressing pain control as uncontrolled pain can increased blood pressure
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
## 2022-12-29 NOTE — HPI
History of present illness- I had the pleasure of meeting this pleasant 47 y.o. gentleman in the pre op clinic prior to his elective Urological surgery. The patient is known  to me .   I have offered to have his family member on the phone during the consultation process  I have obtained the history by speaking to the patient and by reviewing the electronic health records.    Events leading up to surgery / History of presenting illness -    Renal mass  The left-sided renal mass was found incidentally during transplant evaluation  He is not troubled with it  He has blood in the urine and he has noticed that for the past 1 month  He does notice mild amount of blood in the urine -he passes small amount of urine on occasions  Clinically does not appear that he has significant amount bleeding    No pain from this .      Relevant health conditions of significance for the perioperative period/ History of presenting illness -    He had nephrectomy done on the opposite side in May of 2022 which is when I met him for preop evaluation  As per chart-s/p right robotic nephrectomy on 5/19/22 for  renal cell carcinoma      Subjectively describes health as fair     Health conditions of significance for the perioperative period        -coronary artery disease-  PCI to RCA.   -stroke  -hypertension  -diabetes  -Hemodialysis- M-W-F-  ESRD secondary to diabetic nephropathy.  He has been on the wait list for a kidney transplant at [facility] since 12/16/2013     He lives by himself in a ground level apartment complex   He has help available after surgery  His mother and his girlfriend can help   He is currently not working  He was working in [employer] and he last worked in 2012

## 2022-12-29 NOTE — ASSESSMENT & PLAN NOTE
He had a drug-eluting stent in July 2019  He is currently  on   aspirin treatment    I suggest that he continues on aspirin in the perioperative time due to the risk of stent thrombosis without aspirin treatment

## 2022-12-29 NOTE — ASSESSMENT & PLAN NOTE
Aug 2021  Is not require hospitalization, intubation or supplemental oxygen   He has recovered from that and he feels that his breathing is good

## 2022-12-29 NOTE — ASSESSMENT & PLAN NOTE
Clinically the no suggestions of severe valvular disease    Echo from Oct 2022    Aortic Valve    The valve is trileaflet.  Aortic valve sclerosis is moderate. There is normal leaflet mobility. There is mild annular calcification.     Mitral Valve    The mitral valve appears structurally normal. There is normal leaflet mobility.  The estimated mitral valve area by pressure half time is 2.52 cm2.     Tricuspid Valve    The tricuspid valve appears structurally normal. There is normal leaflet mobility. There is trace regurgitation. The estimated PA systolic pressure is greater than 16 mmHg.     Pulmonic Valve    Pulmonic valve is structurally normal. There is normal leaflet mobility.
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
## 2022-12-29 NOTE — OUTPATIENT SUBJECTIVE & OBJECTIVE
"Outpatient Subjective & Objective     Chief complaint-Preoperative evaluation, Perioperative Medical management, complication reduction plan     Active cardiac conditions- none    Revised cardiac risk index predictors- coronary artery disease, history of cerebrovascular disease, and creatinine more than 2    Functional capacity -Examples of physical activity, walks regularly ,  can take 1 flight of stairs----- He can undertake all the above activities without  chest pain,chest tightness, Shortness of breath ,dizziness,lightheadedness making his exercise tolerance more,   than 4 Mets.       Review of Systems   Constitutional:  Negative for chills and fever.        No unusual weight changes     HENT:          STOPBANG score 3 / 8      Elevated BP    Neck size over 40 CM  Male gender   Eyes:         No sudden vision changes   Respiratory:          No cough , phlegm    No Hemoptysis   Cardiovascular:         As noted   Gastrointestinal:         Bowels- Regular    Endocrine:        Prednisone use > 20 mg daily for 3 weeks- none   Genitourinary:  Negative for dysuria.        No urinary hesitancy    Musculoskeletal:           No unusual muscle/ joint pains   Skin:  Negative for rash.   Neurological:  Negative for syncope.        Right sided- unilateral weakness   Hematological:         Current use of Anticoagulants  none   Psychiatric/Behavioral:          No Depression,Anxiety       No history of DVT or pulmonary embolism    He gets colonoscopy    No anesthesia, bleeding, cardiac, PONV  problems with previous surgeries/procedures.  Medications and Allergies reviewed in epic.     FH- No anesthesia,bleeding / venous thrombosis ,problems in family      Physical Exam  Blood pressure (!) 154/84, pulse 66, temperature 98 °F (36.7 °C), temperature source Oral, resp. rate 18, height 5' 8" (1.727 m), weight 79.2 kg (174 lb 11.2 oz), SpO2 97 %.      Physical Exam  Constitutional- Vitals - Body mass index is 26.56 kg/m².,   Vitals: "    12/29/22 1010   BP: (!) 154/84   Pulse: 66   Resp: 18   Temp: 98 °F (36.7 °C)     General appearance-Conscious,Coherent  Eyes- No conjunctival icterus,pupils  round  and reactive to light   ENT-Oral cavity- moist    , Hearing grossly normal   Neck- No thyromegaly ,Trachea -central, No jugular venous distension,   No Carotid Bruit   Cardiovascular -Heart Sounds- Normal , systolic murmur tricuspid area , and  systolic murmur mitral area  , No gallop rhythm   Respiratory - Normal Respiratory Effort, Normal breath sounds,  no wheeze , and  no forced expiratory wheeze    Peripheral pitting pedal edema-- none , no calf pain   Gastrointestinal -Soft abdomen, No palpable masses, Non Tender,Liver,Spleen not palpable. No-- free fluid and shifting dullness  Musculoskeletal- No finger Clubbing. Strength grossly normal   Lymphatic-No Palpable cervical, axillary,Inguinal lymphadenopathy   Psychiatric - normal effect,Orientation  Rt Dorsalis pedis pulses-palpable    Lt Dorsalis pedis pulses- palpable   Rt Posterior tibial pulses -palpable   Left posterior tibial pulses -palpable   Miscellaneous -  Surgical scarabdomen   and  no renal bruit   Investigations  Lab and Imaging have been reviewed in [facility].    Review of Medicine tests    EKG- I had independently reviewed the EKG from--11/30/2022  It was reported to be showing     Normal sinus rhythm   Minimal voltage criteria for LVH, may be normal variant   Borderline Abnormal ECG   When compared with ECG of 13-JUN-2022 17:28,   Questionable change in The axis   ST no longer depressed in Lateral leads   T wave inversion no longer evident in Inferior leads   T wave inversion less evident in Lateral leads     Review of clinical lab tests:  Lab Results   Component Value Date    CREATININE 10.7 (H) 12/01/2022    HGB 12.1 (L) 12/01/2022     12/01/2022           Review of old records- Was done and information gathered regards to events leading to surgery and health conditions of  significance in the perioperative period.    Outpatient Subjective & Objective

## 2022-12-29 NOTE — ASSESSMENT & PLAN NOTE
Behind the right ear for about a week   It has drained clear fluid a few days ago  No pain  Getting better  It does not have any suggestions of an acute infection  It will hopefully resolve by the time of surgery

## 2023-01-04 ENCOUNTER — ANESTHESIA EVENT (OUTPATIENT)
Dept: SURGERY | Facility: HOSPITAL | Age: 48
DRG: 656 | End: 2023-01-04
Payer: MEDICARE

## 2023-01-04 ENCOUNTER — TELEPHONE (OUTPATIENT)
Dept: UROLOGY | Facility: CLINIC | Age: 48
End: 2023-01-04
Payer: MEDICARE

## 2023-01-04 NOTE — TELEPHONE ENCOUNTER
You are scheduled for surgery with  on 01/05/2023. Your arrival time is 0800 am. You are to [facility] to [facility] on the 2nd fl of the [facility]. You need someone with you to drive you home following your surgery.  No alcohol 24 hours before and after and no smoking a few days prior. NOTHING TO EAT OR DRINK AFTER MIDNIGHT THE NIGHT PRIOR TO THE SURGERY INCLUDING GUM, CANDY AND MINTS. Take a shower the night before and the morning of with Hibiclens Antibacterial soap and no lotion, cologne, deodorant or powder in the morning.

## 2023-01-05 ENCOUNTER — ANESTHESIA (OUTPATIENT)
Dept: SURGERY | Facility: HOSPITAL | Age: 48
DRG: 656 | End: 2023-01-05
Payer: MEDICARE

## 2023-01-05 ENCOUNTER — HOSPITAL ENCOUNTER (INPATIENT)
Facility: HOSPITAL | Age: 48
LOS: 4 days | Discharge: HOME HEALTH | DRG: 656 | End: 2023-01-09
Attending: UROLOGY | Admitting: UROLOGY
Payer: MEDICARE

## 2023-01-05 DIAGNOSIS — N28.89 LEFT RENAL MASS: Primary | ICD-10-CM

## 2023-01-05 LAB
ALBUMIN SERPL BCP-MCNC: 2.9 G/DL (ref 3.5–5.2)
ALP SERPL-CCNC: 87 U/L (ref 55–135)
ALT SERPL W/O P-5'-P-CCNC: 13 U/L (ref 10–44)
ANION GAP SERPL CALC-SCNC: 10 MMOL/L (ref 8–16)
AST SERPL-CCNC: 13 U/L (ref 10–40)
BASOPHILS # BLD AUTO: 0.02 K/UL (ref 0–0.2)
BASOPHILS NFR BLD: 0.1 % (ref 0–1.9)
BILIRUB SERPL-MCNC: 0.7 MG/DL (ref 0.1–1)
BUN SERPL-MCNC: 38 MG/DL (ref 6–20)
CALCIUM SERPL-MCNC: 9 MG/DL (ref 8.7–10.5)
CHLORIDE SERPL-SCNC: 103 MMOL/L (ref 95–110)
CO2 SERPL-SCNC: 27 MMOL/L (ref 23–29)
CREAT SERPL-MCNC: 8.9 MG/DL (ref 0.5–1.4)
DIFFERENTIAL METHOD: ABNORMAL
EOSINOPHIL # BLD AUTO: 0 K/UL (ref 0–0.5)
EOSINOPHIL NFR BLD: 0.2 % (ref 0–8)
ERYTHROCYTE [DISTWIDTH] IN BLOOD BY AUTOMATED COUNT: 14 % (ref 11.5–14.5)
EST. GFR  (NO RACE VARIABLE): 6.9 ML/MIN/1.73 M^2
GLUCOSE SERPL-MCNC: 214 MG/DL (ref 70–110)
HCT VFR BLD AUTO: 28.9 % (ref 40–54)
HGB BLD-MCNC: 8.9 G/DL (ref 14–18)
IMM GRANULOCYTES # BLD AUTO: 0.09 K/UL (ref 0–0.04)
IMM GRANULOCYTES NFR BLD AUTO: 0.6 % (ref 0–0.5)
LYMPHOCYTES # BLD AUTO: 1.2 K/UL (ref 1–4.8)
LYMPHOCYTES NFR BLD: 7.5 % (ref 18–48)
MAGNESIUM SERPL-MCNC: 2 MG/DL (ref 1.6–2.6)
MCH RBC QN AUTO: 30.6 PG (ref 27–31)
MCHC RBC AUTO-ENTMCNC: 30.8 G/DL (ref 32–36)
MCV RBC AUTO: 99 FL (ref 82–98)
MONOCYTES # BLD AUTO: 0.5 K/UL (ref 0.3–1)
MONOCYTES NFR BLD: 3.1 % (ref 4–15)
NEUTROPHILS # BLD AUTO: 13.8 K/UL (ref 1.8–7.7)
NEUTROPHILS NFR BLD: 88.5 % (ref 38–73)
NRBC BLD-RTO: 0 /100 WBC
PHOSPHATE SERPL-MCNC: 4.8 MG/DL (ref 2.7–4.5)
PLATELET # BLD AUTO: 289 K/UL (ref 150–450)
PMV BLD AUTO: 10.3 FL (ref 9.2–12.9)
POCT GLUCOSE: 153 MG/DL (ref 70–110)
POCT GLUCOSE: 214 MG/DL (ref 70–110)
POTASSIUM SERPL-SCNC: 4.8 MMOL/L (ref 3.5–5.1)
PROT SERPL-MCNC: 7 G/DL (ref 6–8.4)
RBC # BLD AUTO: 2.91 M/UL (ref 4.6–6.2)
SODIUM SERPL-SCNC: 140 MMOL/L (ref 136–145)
WBC # BLD AUTO: 15.63 K/UL (ref 3.9–12.7)

## 2023-01-05 PROCEDURE — 83735 ASSAY OF MAGNESIUM: CPT | Mod: NTX | Performed by: STUDENT IN AN ORGANIZED HEALTH CARE EDUCATION/TRAINING PROGRAM

## 2023-01-05 PROCEDURE — 27000221 HC OXYGEN, UP TO 24 HOURS: Mod: NTX

## 2023-01-05 PROCEDURE — 44238 UNLISTED LAPS PX INTESTINE: CPT | Mod: NTX,,, | Performed by: SURGERY

## 2023-01-05 PROCEDURE — 82962 GLUCOSE BLOOD TEST: CPT | Mod: NTX | Performed by: UROLOGY

## 2023-01-05 PROCEDURE — 76942 ECHO GUIDE FOR BIOPSY: CPT | Mod: NTX | Performed by: STUDENT IN AN ORGANIZED HEALTH CARE EDUCATION/TRAINING PROGRAM

## 2023-01-05 PROCEDURE — D9220A PRA ANESTHESIA: Mod: ANES,NTX,, | Performed by: ANESTHESIOLOGY

## 2023-01-05 PROCEDURE — D9220A PRA ANESTHESIA: ICD-10-PCS | Mod: CRNA,NTX,, | Performed by: NURSE ANESTHETIST, CERTIFIED REGISTERED

## 2023-01-05 PROCEDURE — 36000713 HC OR TIME LEV V EA ADD 15 MIN: Mod: NTX | Performed by: UROLOGY

## 2023-01-05 PROCEDURE — 25000003 PHARM REV CODE 250: Mod: NTX | Performed by: STUDENT IN AN ORGANIZED HEALTH CARE EDUCATION/TRAINING PROGRAM

## 2023-01-05 PROCEDURE — 63600175 PHARM REV CODE 636 W HCPCS: Mod: NTX | Performed by: ANESTHESIOLOGY

## 2023-01-05 PROCEDURE — 27201423 OPTIME MED/SURG SUP & DEVICES STERILE SUPPLY: Mod: NTX | Performed by: UROLOGY

## 2023-01-05 PROCEDURE — 85025 COMPLETE CBC W/AUTO DIFF WBC: CPT | Mod: NTX | Performed by: STUDENT IN AN ORGANIZED HEALTH CARE EDUCATION/TRAINING PROGRAM

## 2023-01-05 PROCEDURE — 25000003 PHARM REV CODE 250: Mod: NTX | Performed by: NURSE ANESTHETIST, CERTIFIED REGISTERED

## 2023-01-05 PROCEDURE — 94761 N-INVAS EAR/PLS OXIMETRY MLT: CPT | Mod: NTX

## 2023-01-05 PROCEDURE — 20600001 HC STEP DOWN PRIVATE ROOM: Mod: NTX

## 2023-01-05 PROCEDURE — 88307 TISSUE EXAM BY PATHOLOGIST: CPT | Mod: 26,NTX,, | Performed by: PATHOLOGY

## 2023-01-05 PROCEDURE — 63600175 PHARM REV CODE 636 W HCPCS: Mod: NTX | Performed by: STUDENT IN AN ORGANIZED HEALTH CARE EDUCATION/TRAINING PROGRAM

## 2023-01-05 PROCEDURE — 64999 UNLISTED PX NERVOUS SYSTEM: CPT | Mod: NTX,,, | Performed by: ANESTHESIOLOGY

## 2023-01-05 PROCEDURE — 50545 LAPARO RADICAL NEPHRECTOMY: CPT | Mod: LT,,, | Performed by: UROLOGY

## 2023-01-05 PROCEDURE — 37000008 HC ANESTHESIA 1ST 15 MINUTES: Mod: NTX | Performed by: UROLOGY

## 2023-01-05 PROCEDURE — D9220A PRA ANESTHESIA: ICD-10-PCS | Mod: ANES,NTX,, | Performed by: ANESTHESIOLOGY

## 2023-01-05 PROCEDURE — 71000033 HC RECOVERY, INTIAL HOUR: Mod: NTX | Performed by: UROLOGY

## 2023-01-05 PROCEDURE — 50545 PR LAP, RADICAL NEPHRECTOMY: ICD-10-PCS | Mod: LT,,, | Performed by: UROLOGY

## 2023-01-05 PROCEDURE — 71000015 HC POSTOP RECOV 1ST HR: Mod: NTX | Performed by: UROLOGY

## 2023-01-05 PROCEDURE — 63600175 PHARM REV CODE 636 W HCPCS: Mod: NTX | Performed by: NURSE ANESTHETIST, CERTIFIED REGISTERED

## 2023-01-05 PROCEDURE — 71000039 HC RECOVERY, EACH ADD'L HOUR: Mod: NTX | Performed by: UROLOGY

## 2023-01-05 PROCEDURE — 71000016 HC POSTOP RECOV ADDL HR: Mod: NTX | Performed by: UROLOGY

## 2023-01-05 PROCEDURE — 36000712 HC OR TIME LEV V 1ST 15 MIN: Mod: NTX | Performed by: UROLOGY

## 2023-01-05 PROCEDURE — 88307 TISSUE EXAM BY PATHOLOGIST: CPT | Mod: NTX | Performed by: PATHOLOGY

## 2023-01-05 PROCEDURE — 88307 PR  SURG PATH,LEVEL V: ICD-10-PCS | Mod: 26,NTX,, | Performed by: PATHOLOGY

## 2023-01-05 PROCEDURE — 64999: ICD-10-PCS | Mod: NTX,,, | Performed by: ANESTHESIOLOGY

## 2023-01-05 PROCEDURE — 80053 COMPREHEN METABOLIC PANEL: CPT | Mod: NTX | Performed by: STUDENT IN AN ORGANIZED HEALTH CARE EDUCATION/TRAINING PROGRAM

## 2023-01-05 PROCEDURE — 84100 ASSAY OF PHOSPHORUS: CPT | Mod: NTX | Performed by: STUDENT IN AN ORGANIZED HEALTH CARE EDUCATION/TRAINING PROGRAM

## 2023-01-05 PROCEDURE — 37000009 HC ANESTHESIA EA ADD 15 MINS: Mod: NTX | Performed by: UROLOGY

## 2023-01-05 PROCEDURE — 44238 PR LAPAROSCOPIC REPAIR, LARGE INTESTINE: ICD-10-PCS | Mod: NTX,,, | Performed by: SURGERY

## 2023-01-05 PROCEDURE — D9220A PRA ANESTHESIA: Mod: CRNA,NTX,, | Performed by: NURSE ANESTHETIST, CERTIFIED REGISTERED

## 2023-01-05 RX ORDER — MIDAZOLAM HYDROCHLORIDE 1 MG/ML
INJECTION, SOLUTION INTRAMUSCULAR; INTRAVENOUS
Status: DISCONTINUED | OUTPATIENT
Start: 2023-01-05 | End: 2023-01-05

## 2023-01-05 RX ORDER — ONDANSETRON 2 MG/ML
INJECTION INTRAMUSCULAR; INTRAVENOUS
Status: DISCONTINUED | OUTPATIENT
Start: 2023-01-05 | End: 2023-01-05

## 2023-01-05 RX ORDER — FENTANYL CITRATE 50 UG/ML
INJECTION, SOLUTION INTRAMUSCULAR; INTRAVENOUS
Status: DISCONTINUED | OUTPATIENT
Start: 2023-01-05 | End: 2023-01-05

## 2023-01-05 RX ORDER — HEPARIN SODIUM 5000 [USP'U]/ML
5000 INJECTION, SOLUTION INTRAVENOUS; SUBCUTANEOUS EVERY 8 HOURS
Status: DISCONTINUED | OUTPATIENT
Start: 2023-01-06 | End: 2023-01-09 | Stop reason: HOSPADM

## 2023-01-05 RX ORDER — ACETAMINOPHEN 500 MG
1000 TABLET ORAL EVERY 6 HOURS
Status: DISCONTINUED | OUTPATIENT
Start: 2023-01-05 | End: 2023-01-09 | Stop reason: HOSPADM

## 2023-01-05 RX ORDER — FENTANYL CITRATE 50 UG/ML
25-200 INJECTION, SOLUTION INTRAMUSCULAR; INTRAVENOUS
Status: DISCONTINUED | OUTPATIENT
Start: 2023-01-05 | End: 2023-01-05

## 2023-01-05 RX ORDER — DROPERIDOL 2.5 MG/ML
0.62 INJECTION, SOLUTION INTRAMUSCULAR; INTRAVENOUS ONCE AS NEEDED
Status: DISCONTINUED | OUTPATIENT
Start: 2023-01-05 | End: 2023-01-05 | Stop reason: HOSPADM

## 2023-01-05 RX ORDER — SODIUM CHLORIDE 9 MG/ML
INJECTION, SOLUTION INTRAVENOUS
Status: DISCONTINUED | OUTPATIENT
Start: 2023-01-06 | End: 2023-01-05

## 2023-01-05 RX ORDER — HYDROMORPHONE HYDROCHLORIDE 1 MG/ML
0.2 INJECTION, SOLUTION INTRAMUSCULAR; INTRAVENOUS; SUBCUTANEOUS EVERY 5 MIN PRN
Status: DISCONTINUED | OUTPATIENT
Start: 2023-01-05 | End: 2023-01-05 | Stop reason: HOSPADM

## 2023-01-05 RX ORDER — MIDAZOLAM HYDROCHLORIDE 1 MG/ML
0.5 INJECTION INTRAMUSCULAR; INTRAVENOUS
Status: DISCONTINUED | OUTPATIENT
Start: 2023-01-05 | End: 2023-01-05

## 2023-01-05 RX ORDER — MIDAZOLAM HYDROCHLORIDE 1 MG/ML
.5-4 INJECTION INTRAMUSCULAR; INTRAVENOUS
Status: DISCONTINUED | OUTPATIENT
Start: 2023-01-05 | End: 2023-01-05

## 2023-01-05 RX ORDER — DEXMEDETOMIDINE HYDROCHLORIDE 100 UG/ML
INJECTION, SOLUTION INTRAVENOUS
Status: DISCONTINUED | OUTPATIENT
Start: 2023-01-05 | End: 2023-01-05

## 2023-01-05 RX ORDER — HEPARIN SODIUM 5000 [USP'U]/ML
5000 INJECTION, SOLUTION INTRAVENOUS; SUBCUTANEOUS ONCE
Status: COMPLETED | OUTPATIENT
Start: 2023-01-05 | End: 2023-01-05

## 2023-01-05 RX ORDER — CARVEDILOL 25 MG/1
25 TABLET ORAL 2 TIMES DAILY WITH MEALS
Status: DISCONTINUED | OUTPATIENT
Start: 2023-01-05 | End: 2023-01-09 | Stop reason: HOSPADM

## 2023-01-05 RX ORDER — TALC
6 POWDER (GRAM) TOPICAL NIGHTLY PRN
Status: DISCONTINUED | OUTPATIENT
Start: 2023-01-05 | End: 2023-01-09 | Stop reason: HOSPADM

## 2023-01-05 RX ORDER — LIDOCAINE HYDROCHLORIDE 10 MG/ML
INJECTION, SOLUTION INTRAVENOUS
Status: DISCONTINUED | OUTPATIENT
Start: 2023-01-05 | End: 2023-01-05

## 2023-01-05 RX ORDER — AMLODIPINE BESYLATE 10 MG/1
10 TABLET ORAL DAILY
Status: DISCONTINUED | OUTPATIENT
Start: 2023-01-06 | End: 2023-01-09 | Stop reason: HOSPADM

## 2023-01-05 RX ORDER — ROCURONIUM BROMIDE 10 MG/ML
INJECTION, SOLUTION INTRAVENOUS
Status: DISCONTINUED | OUTPATIENT
Start: 2023-01-05 | End: 2023-01-05

## 2023-01-05 RX ORDER — PROPOFOL 10 MG/ML
VIAL (ML) INTRAVENOUS
Status: DISCONTINUED | OUTPATIENT
Start: 2023-01-05 | End: 2023-01-05

## 2023-01-05 RX ORDER — PHENYLEPHRINE HYDROCHLORIDE 10 MG/ML
INJECTION INTRAVENOUS
Status: DISCONTINUED | OUTPATIENT
Start: 2023-01-05 | End: 2023-01-05

## 2023-01-05 RX ORDER — ONDANSETRON 4 MG/1
4 TABLET, ORALLY DISINTEGRATING ORAL EVERY 6 HOURS PRN
Status: DISCONTINUED | OUTPATIENT
Start: 2023-01-05 | End: 2023-01-09 | Stop reason: HOSPADM

## 2023-01-05 RX ORDER — ACETAMINOPHEN 500 MG
1000 TABLET ORAL ONCE
Status: COMPLETED | OUTPATIENT
Start: 2023-01-05 | End: 2023-01-05

## 2023-01-05 RX ORDER — VASOPRESSIN 20 [USP'U]/ML
INJECTION, SOLUTION INTRAMUSCULAR; SUBCUTANEOUS
Status: DISCONTINUED | OUTPATIENT
Start: 2023-01-05 | End: 2023-01-05

## 2023-01-05 RX ORDER — FENTANYL CITRATE 50 UG/ML
25 INJECTION, SOLUTION INTRAMUSCULAR; INTRAVENOUS EVERY 5 MIN PRN
Status: DISCONTINUED | OUTPATIENT
Start: 2023-01-05 | End: 2023-01-05

## 2023-01-05 RX ORDER — OXYCODONE HYDROCHLORIDE 5 MG/1
5 TABLET ORAL EVERY 4 HOURS PRN
Status: DISCONTINUED | OUTPATIENT
Start: 2023-01-05 | End: 2023-01-06

## 2023-01-05 RX ORDER — NEOSTIGMINE METHYLSULFATE 0.5 MG/ML
INJECTION, SOLUTION INTRAVENOUS
Status: DISCONTINUED | OUTPATIENT
Start: 2023-01-05 | End: 2023-01-05

## 2023-01-05 RX ORDER — ONDANSETRON 2 MG/ML
4 INJECTION INTRAMUSCULAR; INTRAVENOUS ONCE AS NEEDED
Status: DISCONTINUED | OUTPATIENT
Start: 2023-01-05 | End: 2023-01-05 | Stop reason: HOSPADM

## 2023-01-05 RX ADMIN — OXYCODONE 5 MG: 5 TABLET ORAL at 05:01

## 2023-01-05 RX ADMIN — ROCURONIUM BROMIDE 10 MG: 10 INJECTION INTRAVENOUS at 12:01

## 2023-01-05 RX ADMIN — MIDAZOLAM 2 MG: 1 INJECTION INTRAMUSCULAR; INTRAVENOUS at 08:01

## 2023-01-05 RX ADMIN — HEPARIN SODIUM 5000 UNITS: 5000 INJECTION INTRAVENOUS; SUBCUTANEOUS at 11:01

## 2023-01-05 RX ADMIN — NEOSTIGMINE METHYLSULFATE 5 MG: 0.5 INJECTION, SOLUTION INTRAVENOUS at 03:01

## 2023-01-05 RX ADMIN — VASOPRESSIN 2 UNITS: 20 INJECTION INTRAVENOUS at 12:01

## 2023-01-05 RX ADMIN — ONDANSETRON 4 MG: 2 INJECTION INTRAMUSCULAR; INTRAVENOUS at 12:01

## 2023-01-05 RX ADMIN — ACETAMINOPHEN 1000 MG: 500 TABLET ORAL at 05:01

## 2023-01-05 RX ADMIN — FENTANYL CITRATE 100 MCG: 50 INJECTION, SOLUTION INTRAMUSCULAR; INTRAVENOUS at 11:01

## 2023-01-05 RX ADMIN — MIDAZOLAM HYDROCHLORIDE 2 MG: 1 INJECTION, SOLUTION INTRAMUSCULAR; INTRAVENOUS at 11:01

## 2023-01-05 RX ADMIN — HYDROMORPHONE HYDROCHLORIDE 0.2 MG: 1 INJECTION, SOLUTION INTRAMUSCULAR; INTRAVENOUS; SUBCUTANEOUS at 05:01

## 2023-01-05 RX ADMIN — PROPOFOL 150 MG: 10 INJECTION, EMULSION INTRAVENOUS at 11:01

## 2023-01-05 RX ADMIN — VASOPRESSIN 1 UNITS: 20 INJECTION INTRAVENOUS at 11:01

## 2023-01-05 RX ADMIN — CEFAZOLIN 2 G: 330 INJECTION, POWDER, FOR SOLUTION INTRAMUSCULAR; INTRAVENOUS at 11:01

## 2023-01-05 RX ADMIN — ROCURONIUM BROMIDE 20 MG: 10 INJECTION INTRAVENOUS at 12:01

## 2023-01-05 RX ADMIN — ACETAMINOPHEN 1000 MG: 500 TABLET ORAL at 08:01

## 2023-01-05 RX ADMIN — HEPARIN SODIUM 5000 UNITS: 5000 INJECTION INTRAVENOUS; SUBCUTANEOUS at 09:01

## 2023-01-05 RX ADMIN — CEFAZOLIN 2 G: 330 INJECTION, POWDER, FOR SOLUTION INTRAMUSCULAR; INTRAVENOUS at 03:01

## 2023-01-05 RX ADMIN — PHENYLEPHRINE HYDROCHLORIDE 100 MCG: 10 INJECTION INTRAVENOUS at 11:01

## 2023-01-05 RX ADMIN — LIDOCAINE HYDROCHLORIDE 100 MG: 10 INJECTION, SOLUTION INTRAVENOUS at 11:01

## 2023-01-05 RX ADMIN — ROCURONIUM BROMIDE 20 MG: 10 INJECTION INTRAVENOUS at 02:01

## 2023-01-05 RX ADMIN — ROCURONIUM BROMIDE 50 MG: 10 INJECTION INTRAVENOUS at 11:01

## 2023-01-05 RX ADMIN — SODIUM CHLORIDE: 0.9 INJECTION, SOLUTION INTRAVENOUS at 12:01

## 2023-01-05 RX ADMIN — VASOPRESSIN 0.04 UNITS/MIN: 20 INJECTION INTRAVENOUS at 12:01

## 2023-01-05 RX ADMIN — SODIUM CHLORIDE: 0.9 INJECTION, SOLUTION INTRAVENOUS at 11:01

## 2023-01-05 RX ADMIN — OXYCODONE 5 MG: 5 TABLET ORAL at 09:01

## 2023-01-05 RX ADMIN — GLYCOPYRROLATE 0.6 MG: 0.2 INJECTION, SOLUTION INTRAMUSCULAR; INTRAVENOUS at 03:01

## 2023-01-05 RX ADMIN — ACETAMINOPHEN 1000 MG: 500 TABLET ORAL at 11:01

## 2023-01-05 RX ADMIN — DEXMEDETOMIDINE HYDROCHLORIDE 12 MCG: 100 INJECTION, SOLUTION INTRAVENOUS at 03:01

## 2023-01-05 RX ADMIN — FENTANYL CITRATE 50 MCG: 50 INJECTION INTRAMUSCULAR; INTRAVENOUS at 08:01

## 2023-01-05 RX ADMIN — ROCURONIUM BROMIDE 20 MG: 10 INJECTION INTRAVENOUS at 01:01

## 2023-01-05 NOTE — ANESTHESIA PROCEDURE NOTES
Intubation    Date/Time: 1/5/2023 11:28 AM  Performed by: Mel Whitaker CRNA  Authorized by: González Horn MD     Intubation:     Induction:  Intravenous    Intubated:  Postinduction    Mask Ventilation:  Easy mask    Attempts:  1    Attempted By:  CRNA    Method of Intubation:  Video laryngoscopy    Blade:  Card 3    Laryngeal View Grade: Grade I - full view of cords      Difficult Airway Encountered?: No      Complications:  None    Airway Device:  Oral endotracheal tube    Airway Device Size:  7.5    Style/Cuff Inflation:  Cuffed (inflated to minimal occlusive pressure)    Tube secured:  21    Secured at:  The lips    Placement Verified By:  Capnometry    Complicating Factors:  None    Findings Post-Intubation:  BS equal bilateral and atraumatic/condition of teeth unchanged

## 2023-01-05 NOTE — ANESTHESIA POSTPROCEDURE EVALUATION
Anesthesia Post Evaluation    Patient: Haroldo Dickinson    Procedure(s) Performed: Procedure(s) (LRB):  XI ROBOTIC NEPHRECTOMY (Left)    Final Anesthesia Type: general      Patient location during evaluation: PACU  Patient participation: Yes- Able to Participate  Level of consciousness: awake and alert and oriented  Post-procedure vital signs: reviewed and stable  Pain management: adequate  Airway patency: patent    PONV status at discharge: No PONV  Anesthetic complications: no      Cardiovascular status: hemodynamically stable  Respiratory status: unassisted, spontaneous ventilation and room air  Hydration status: euvolemic  Follow-up not needed.          Vitals Value Taken Time   /57 01/05/23 1631   Temp 36.5 °C (97.7 °F) 01/05/23 1550   Pulse 68 01/05/23 1637   Resp 22 01/05/23 1637   SpO2 100 % 01/05/23 1637   Vitals shown include unvalidated device data.      No case tracking events are documented in the log.      Pain/Marialuisa Score: Pain Rating Prior to Med Admin: 0 (1/5/2023  8:54 AM)  Pain Rating Post Med Admin: 0 (1/5/2023 10:50 AM)  Marialuisa Score: 5 (1/5/2023  4:20 PM)

## 2023-01-05 NOTE — OP NOTE
[facility]  Operative Note     Date: 01/05/2023      Pre-Op Diagnosis:   Left renal masses suspicious for RCC  ESRD on HD  ADPCKD  History of RCC s/p right nephrectomy    Patient Active Problem List   Diagnosis    Anemia    Controlled type 2 diabetes mellitus with CKD    Cerebral microvascular disease    End stage renal failure on dialysis    Hyperparathyroidism, secondary renal    Preoperative cardiovascular examination    Left ventricular hypertrophy due to hypertensive disease    Hemiparesis affecting right side as late effect of cerebrovascular accident    Abnormality of gait following cerebrovascular accident (CVA)    Renal hypertension    Major vascular neurocognitive disorder, probable, without behavioral disturbance    CAD (coronary artery disease), native coronary artery    Essential hypertension    Mixed hyperlipidemia    Patient on waiting list for kidney transplant    Arteriovenous fistula    Post PTCA    Screening for colon cancer    Colon cancer screening    History of colon polyps    Controlled type 2 diabetes mellitus with both eyes affected by proliferative retinopathy and macular edema, without long-term current use of insulin    Hypertensive retinopathy, bilateral    Varicose veins of bilateral lower extremities with other complications    Left renal mass    S/p nephrectomy    Elevated troponin    Anemia in ESRD (end-stage renal disease)    Chronic kidney disease-mineral and bone disorder    History of COVID-19    Soft tissue swelling    Cardiac murmur     Post-Op Diagnosis: same     Procedure(s) Performed:   1.  Left robotic nephrectomy     Specimen(s):   Left kidney     Surgeon: Aidan Hendricks MD    Assistant: Edil Fernandez MD    Bedside assistant: DENTON Jose (no qualified resident was available for bedside assistance)     Anesthesia: General endotracheal anesthesia     Indications: Haroldo Dickinson is a 47 y.o. male  with left renal mass suspicious for renal cell  carcinoma. He is ESRD on HD and has a history of RCC s/p right nephrectomy. After discussion of management options and risks and benefits associated with each the patient has elected to pursue surgical management.       Findings:   1. Left kidney removed while sparing adrenal.  2. 1 artery, 1 vein.  3. Upon Verress insertion in LUQ, feculent material returned. New Verress access obtained in LLQ and abdomen insufflated. Needle injury to left colon discovered. Only the anterior wall of the colon was injured. Repaired by Dr. Maikel Lamb with surgical oncology using interrupted 3-0 Vicryl. No intraperitoneal spillage of colon contents.      EBL: 50 mL     Drains: none    Anesthesia: General endotracheal anesthesia     Complications: Injury to left colon upon Verress needle insertion     Procedure in Detail: After discussion of risks and benefits of the procedure, informed consent was obtained.  The patient was brought to the operating room and placed supine on the operating table.  SCDs were applied and working prior to induction of anesthesia.  General anesthesia was administered. An OG tube was placed. The patient was then moved into the modified flank position with the left side up. The patient was appropriately padded and secured to the table. The patient was then prepped and draped in the usual sterile fashion. Timeout was performed and preoperative antibiotics were confirmed.       A Veress needle was introduced into the abdomen in the left upper quadrant with two pops of the fascia then peritoneum being felt. The needle was advanced minimally after the second pop. Drop test revealed feculent material. The needle was left in place. An additional Verress needle was introduced into the abdomen in the left lower quadrant. Entry into the peritoneal cavity was confirmed with the drop test and an initial insufflation pressure of <10mmHg. Aspiration during our drop test revealed no blood or succus. The peritoneal  cavity was insufflated up to 15 mmHg and the Veress was removed. An 8 mm port was introduced in the left lower quadrant after incising the entry point with an 11 blade. The 8 mm port was then introduced under direct vision. The abdomen was inspected which revealed the initial Verress to be passing into the anterior wall of the left colon. An additional 8 mm port was then introduced into the left upper aspect of the abdomen under direct vision. The site of colonic injury was marked with a marking pen tip attached to a laparoscopic grasper. The remainng ports were placed in standard fashion along the left lateral abdominal wall for a total of three 8 mm ports and one 12 mm port for our robotic stapler. The robot was docked.    Dr. Maikel Lamb with surgical oncology was consulted intraoperatively. He repaired the colonic injury primarily with 3-0 Vicryl interrupted. The patient's kidney was large in size with the left colon draped over the kidney.     Then, dissection began along the white line of Toldt, reflecting the colon medially away from the kidney. Inspection of the site of injury  revealed that the anterior wall only was perforated and there was no injury to the posterior surface of the colon. The splenic reflection was released. The psoas muscle was identified. Once the colon was reflected, dissection was carried out just below the kidney, and the ureter was identified.  The ureter was elevated and we continued our dissection toward the lower pole of the kidney proximally until we identified the renal hilum. The gonadal vein was ligated to aid with visualization.     Careful dissection of the hilum was performed.  The renal vein was easily identified anteriorly.  There was 1 renal vein and 1 renal artery. The vessels were then skeletonized adequately. The artery was taken with a vascular staple load via the robotic stapler. A second load was used to divide the renal vein. Hemostasis was excellent.      The  kidney was then freed from the remaining superior, posterior and lateral attachments. The ureter was divided. Hemostasis was again assessed and was excellent. The mass was placed into an EndoCatch bag via the 12mm midline assistant port. The left lateral 12 mm port was closed with the suture ease device via 0 vicryl. The robot was undocked and all remaining trocars were removed. The 12 mm midline incision was extended from skin down to fascia overlying his old scar to allow removal of the specimen. This was inspected and found to be intact. This was passed off the field for pathologic analysis.       The extraction site fascia was closed using 0 PDS in a running fashion.  All skin incisions were closed using 4-0 monocryl. Dermabond was applied to all of the incisions.     The patient tolerated the procedure well and was transferred to the recovery room in stable condition.     Disposition: The patient will remain on the urology service overnight for observation.     Edil Fernandez MD    I have reviewed the operative note performed by Dr. Fernandez, and I concur with her/his documentation of Haroldo Dickinson. I was present for the critical or key portions of the procedure.

## 2023-01-05 NOTE — TRANSFER OF CARE
"Anesthesia Transfer of Care Note    Patient: Haroldo Dickinson    Procedure(s) Performed: Procedure(s) (LRB):  XI ROBOTIC NEPHRECTOMY (Left)    Patient location: PACU    Anesthesia Type: general    Transport from OR: Transported from OR on 6-10 L/min O2 by face mask with adequate spontaneous ventilation    Post pain: adequate analgesia    Post assessment: no apparent anesthetic complications and tolerated procedure well    Post vital signs: stable    Level of consciousness: sedated    Nausea/Vomiting: no nausea/vomiting    Complications: none    Transfer of care protocol was followed      Last vitals:   Visit Vitals  /74 (BP Location: Left arm, Patient Position: Lying)   Pulse 75   Temp 36.5 °C (97.7 °F) (Temporal)   Resp 19   Ht 5' 8" (1.727 m)   Wt 77.1 kg (169 lb 15.6 oz)   SpO2 99%   BMI 25.84 kg/m²     "

## 2023-01-05 NOTE — H&P
Urology ([facility]) H&P  Staff: Aidan Hendricks MD    CC: Left renal masses, PCKD    HPI:  Haroldo Dickinson Sr. is a 47 y.o. male presenting for left nephrectomy.      Patient has ESRD 2/2 HTN. Does HD, MWF via a right forearm AVF. He does not urinate at all. Undergoing transplant evaluation. Last HD session yesterday (1/4).    S/p right robotic nephrectomy on 5/19/22. Path resulting T1a.   No FH of kidney cancer.      Prior MRI reviewed which showed no evidence of residual disease s/p right nephrectomy. 2 indeterminate left renal lesions. 4 cm and 3 cm. Given the known prior malignancy in his right kidney coupled with solid renal masses in the left kidney, presumably this could be concerning for renal cell carcinoma and likely is renal cell carcinoma.     He had a CVA in 2013, reportedly due to uncontrolled high BP.   BP still not under good control. Only prior abdominal surgery was right robotic nephrectomy. Takes ASA 81---continued aspirin perioperatively due to CAD.    No FH of kidney cancer.      ROS:  Neg except per HPI    Past Medical History:   Diagnosis Date    CAD (coronary artery disease), native coronary artery 11/21/2019    Cataract     Diabetes mellitus     Diabetic retinopathy     Dialysis patient     DM type 2 causing renal disease, not at goal     ESRD (end stage renal disease) started dialysis 01/2014 6/5/2014    Hyperparathyroidism, secondary renal 6/5/2014    Hypertension     NSTEMI (non-ST elevated myocardial infarction) 12/21/2013    Organ transplant candidate 6/5/2014    Pneumonia     Renal hypertension     Stroke        Past Surgical History:   Procedure Laterality Date    ANGIOGRAM, CORONARY, WITH LEFT HEART CATHETERIZATION N/A 7/1/2021    Procedure: Angiogram, Coronary, with Left Heart Cath;  Surgeon: Miki Zendejas MD;  Location: [facility] CATH LAB;  Service: Cardiology;  Laterality: N/A;    COLONOSCOPY N/A 5/3/2017    Procedure: COLONOSCOPY;  Surgeon: Rufino Carpenter MD;  Location: [facility] ENDO  (4TH FLR);  Service: Endoscopy;  Laterality: N/A;  pt states can only schedule on Wednesdays    COLONOSCOPY N/A 2/9/2021    Procedure: COLONOSCOPY;  Surgeon: Edil Wong MD;  Location: [facility] (4TH FLR);  Service: Endoscopy;  Laterality: N/A;  Dialysis MWF/ labwork day of procedure  right arm aceess  per Dr. Colin pt can hold Plavix 5 days prior see note- sm  [facility]     COLONOSCOPY N/A 2/10/2021    Procedure: COLONOSCOPY;  Surgeon: Robert Ayers MD;  Location: [facility] ENDO (4TH FLR);  Service: Endoscopy;  Laterality: N/A;  rescheduled due to poor bowel prep-BB  negative covid screening 2/6/21-BB  dialysis M-W-F-BB  okay to r/s for 2/9/21 and to hold Plavix per Dr. KIRAN Wong-BB  labs same day-BB    CORONARY STENT PLACEMENT N/A 7/25/2019    Procedure: INSERTION, STENT, CORONARY ARTERY;  Surgeon: Miki Zendejas MD;  Location: [facility] CATH LAB;  Service: Cardiology;  Laterality: N/A;    DIALYSIS FISTULA CREATION      FISTULOGRAM N/A 2/18/2019    Procedure: Fistulogram;  Surgeon: Yaneth De La Cruz MD;  Location: [facility] CATH LAB;  Service: Cardiology;  Laterality: N/A;    FISTULOGRAM Right 7/23/2019    Procedure: Fistulogram;  Surgeon: Oz Cordoba MD;  Location: [facility] CATH LAB;  Service: Cardiology;  Laterality: Right;    LAPAROSCOPIC ROBOT-ASSISTED SURGICAL REMOVAL OF KIDNEY USING DA JAIME XI Right 5/19/2022    Procedure: XI ROBOTIC NEPHRECTOMY;  Surgeon: Aidan Hendricks MD;  Location: [address];  Service: Urology;  Laterality: Right;  3hrs    LEFT HEART CATHETERIZATION Left 7/25/2019    Procedure: Left heart cath;  Surgeon: Miki Zendejas MD;  Location: [facility] CATH LAB;  Service: Cardiology;  Laterality: Left;    RETINAL LASER PROCEDURE Bilateral 2018 or 2017    Dr. Rothman    VASCULAR SURGERY         Social History     Socioeconomic History    Marital status: Single   Occupational History     Employer: [employer]   Tobacco Use    Smoking status: Never    Smokeless  tobacco: Never   Substance and Sexual Activity    Alcohol use: Not Currently     Comment: One drink a month    Drug use: No    Sexual activity: Yes     Partners: Female     Birth control/protection: None   Social History Narrative    Disabled    Was     One son            Family History   Problem Relation Age of Onset    Hypertension Mother     Diabetes Mother     Cataracts Mother     Hypertension Father     Hypertension Sister     Kidney disease Brother     Hypertension Brother     Heart disease Brother     Cancer Maternal Grandfather         Colon CA    Colon cancer Neg Hx     Esophageal cancer Neg Hx     Stomach cancer Neg Hx     Rectal cancer Neg Hx     Ulcerative colitis Neg Hx     Irritable bowel syndrome Neg Hx     Crohn's disease Neg Hx     Celiac disease Neg Hx     Glaucoma Neg Hx     Macular degeneration Neg Hx        Review of patient's allergies indicates:   Allergen Reactions    No known allergies        Current Facility-Administered Medications on File Prior to Encounter   Medication Dose Route Frequency Provider Last Rate Last Admin    ceFAZolin injection 2 g  2 g Intravenous On Call Procedure Michelle Spence MD        lidocaine (PF) 10 mg/ml (1%) injection 10 mg  1 mL Intradermal Once Michelle Spence MD         Current Outpatient Medications on File Prior to Encounter   Medication Sig Dispense Refill    amLODIPine (NORVASC) 10 MG tablet Take 1 tablet (10 mg total) by mouth once daily. 30 tablet 3    AURYXIA 210 mg iron Tab Take 420 mg by mouth 3 (three) times daily with meals. Take 1 with snacks      carvediloL (COREG) 25 MG tablet TAKE 1 TABLET BY MOUTH TWICE A DAY WITH FOOD 180 tablet 2    aspirin (ECOTRIN) 81 MG EC tablet Take 1 tablet (81 mg total) by mouth once daily. 30 tablet 11       Anticoagulation:  Yes - ASA 81    Physical Exam:  General: No acute distress, well developed. AAOx3  Head: Normocephalic, Atraumatic  Eyes: Extra-occular movements intact, No discharge  Neck:  supple, symmetrical, trachea midline  Lungs: normal respiratory effort, no respiratory distress, no wheezes  CV: regular rate, 2+ pulses  Abdomen: soft, non-tender, non-distended, no organomegaly, well-healed midline scar and right port scars  MSK: no edema, no deformities, normal ROM  Skin: skin color, texture, turgor normal.  Neurologic: no focal deficits, sensation intact    Labs:    Lab Results   Component Value Date    WBC 10.80 12/01/2022    HGB 12.1 (L) 12/01/2022    HCT 37.1 (L) 12/01/2022    MCV 98 12/01/2022     12/01/2022           BMP  Lab Results   Component Value Date     12/29/2022    K 4.1 12/29/2022     12/29/2022    CO2 24 12/29/2022    BUN 47 (H) 12/29/2022    CREATININE 8.8 (H) 12/29/2022    CALCIUM 7.4 (L) 12/29/2022    ANIONGAP 13 12/29/2022    EGFRNORACEVR 7.0 (A) 12/29/2022       Lab Results   Component Value Date    PSA 1.5 10/20/2022    PSA 0.95 02/06/2020    PSA 0.67 02/14/2017       Imaging:      MRI abdomen w/o contrast (11/22/22):  - No evidence of residual disease on right side s/p right nephrectomy.  - Polycystic left kidney, masses in left upper pole (4.5 cm) and left mid pole (3.2 cm)  - No retroperitoneal lymphadenopathy    Chest x-ray (11/30/22):  - No lesions concerning for metastasis    Assessment: Haroldo Dickinson Sr. is a 47 y.o. male with a history of ADPCKD, ESRD 2/2 HTN, RCC of right kidney, and left renal masses presenting for robotic left nephrectomy.    Plan:     1. To OR today for robotic left nephrectomy, possible open.  2. The risks and benefits of nephrectomy were discussed with the patient in detail. The risks include but are not limited to bleeding possibly requiring a blood transfusion, infection, pain, injury to internal organs such as lung, liver, spleen, bowel, adrenal, incomplete removal of tumor if present. Risks may vary depending on reasons for removal of kidney, persistent flank pain or hernia at area of incision, urinary infection  requiring antibiotics, air embolus, pulmonary embolus and possible conversion to an open surgery. The patient will also be consented for blood. Patient understands these risks and has agreed to proceed with surgery. Consent was obtained.  3. Blood consent obtained and signed.    Edil Fernandez MD

## 2023-01-05 NOTE — NURSING TRANSFER
Nursing Transfer Note      1/5/2023     Reason patient is being transferred: INPT    Transfer To: [id number]    Transfer via bed    Transfer with pt belongings     Transported by transport    Medicines sent: none    Any special needs or follow-up needed: none    Chart send with patient: Yes    Notified: mother    Patient reassessed at: 01/05/2023

## 2023-01-05 NOTE — ANESTHESIA PROCEDURE NOTES
Left Erector Spinae Plane Single Injection    Patient location during procedure: pre-op   Block not for primary anesthetic.  Reason for block: at surgeon's request and post-op pain management   Post-op Pain Location: Left abdomen   Start time: 1/5/2023 8:51 AM  Timeout: 1/5/2023 8:50 AM   End time: 1/5/2023 9:00 AM    Staffing  Authorizing Provider: Julio Calle MD  Performing Provider: Lucita Tolbert MD    Preanesthetic Checklist  Completed: patient identified, IV checked, site marked, risks and benefits discussed, surgical consent, monitors and equipment checked, pre-op evaluation and timeout performed  Peripheral Block  Patient position: sitting  Prep: ChloraPrep  Patient monitoring: heart rate, cardiac monitor, continuous pulse ox, continuous capnometry and frequent blood pressure checks  Block type: erector spinae plane  Laterality: left  Injection technique: single shot  Interspace: T9-10    Needle  Needle type: Stimuplex   Needle gauge: 22 G  Needle length: 4 in  Needle localization: anatomical landmarks and ultrasound guidance   -ultrasound image captured on disc.  Assessment  Injection assessment: negative aspiration, negative parasthesia and local visualized surrounding nerve  Paresthesia pain: none  Heart rate change: no  Slow fractionated injection: yes  Pain Tolerance: comfortable throughout block and no complaints      Additional Notes  Patient tolerated well.  See [facility] RN record for vitals.  30 cc of 0.375% Bupivacaine + epi administered

## 2023-01-05 NOTE — PLAN OF CARE
Patient was prepared for surgery.    No one is with patient. Nurse called mom -Gabby Alok on the phone so patient could speak with her in pre op. She said she is on her way. She was signed up for text updates.    Nerve Block completed.

## 2023-01-05 NOTE — ANESTHESIA PREPROCEDURE EVALUATION
01/05/2023  Haroldo Dickinson is a 47 y.o., male.      Pre-op Assessment          Review of Systems  Anesthesia Hx:  No problems with previous Anesthesia    Cardiovascular:   Hypertension CAD  CABG/stent   Functional Capacity Can you climb two flights of stairs? ==> Yes    Pulmonary:   Denies Asthma.  Denies Sleep Apnea.    Renal/:   Chronic Renal Disease, ESRD, Dialysis    Hepatic/GI:   Denies PUD. Denies GERD. Denies Liver Disease.    Neurological:   Denies CVA. Denies Seizures.    Endocrine:   Diabetes Denies Hypothyroidism.        Physical Exam  General: Alert    Airway:  Mallampati: III / II  Mouth Opening: Normal  TM Distance: Normal  Tongue: Normal  Neck ROM: Normal ROM    Dental:  Caps / Implants        Anesthesia Plan  Type of Anesthesia, risks & benefits discussed:    Anesthesia Type: Gen ETT  Intra-op Monitoring Plan: Standard ASA Monitors  Post Op Pain Control Plan: multimodal analgesia and IV/PO Opioids PRN  Induction:  IV  Airway Plan: Direct  Informed Consent: Informed consent signed with the Patient and all parties understand the risks and agree with anesthesia plan.  All questions answered.   ASA Score: 4    Ready For Surgery From Anesthesia Perspective.     .

## 2023-01-06 LAB
ALBUMIN SERPL BCP-MCNC: 2.9 G/DL (ref 3.5–5.2)
ALP SERPL-CCNC: 89 U/L (ref 55–135)
ALT SERPL W/O P-5'-P-CCNC: 8 U/L (ref 10–44)
ANION GAP SERPL CALC-SCNC: 14 MMOL/L (ref 8–16)
AST SERPL-CCNC: 14 U/L (ref 10–40)
BASOPHILS # BLD AUTO: 0.05 K/UL (ref 0–0.2)
BASOPHILS NFR BLD: 0.3 % (ref 0–1.9)
BILIRUB SERPL-MCNC: 0.6 MG/DL (ref 0.1–1)
BUN SERPL-MCNC: 48 MG/DL (ref 6–20)
CALCIUM SERPL-MCNC: 9.2 MG/DL (ref 8.7–10.5)
CHLORIDE SERPL-SCNC: 100 MMOL/L (ref 95–110)
CO2 SERPL-SCNC: 24 MMOL/L (ref 23–29)
CREAT SERPL-MCNC: 10.3 MG/DL (ref 0.5–1.4)
DIFFERENTIAL METHOD: ABNORMAL
EOSINOPHIL # BLD AUTO: 0.2 K/UL (ref 0–0.5)
EOSINOPHIL NFR BLD: 0.9 % (ref 0–8)
ERYTHROCYTE [DISTWIDTH] IN BLOOD BY AUTOMATED COUNT: 13.8 % (ref 11.5–14.5)
EST. GFR  (NO RACE VARIABLE): 5.8 ML/MIN/1.73 M^2
GLUCOSE SERPL-MCNC: 131 MG/DL (ref 70–110)
HCT VFR BLD AUTO: 28.2 % (ref 40–54)
HGB BLD-MCNC: 8.7 G/DL (ref 14–18)
IMM GRANULOCYTES # BLD AUTO: 0.06 K/UL (ref 0–0.04)
IMM GRANULOCYTES NFR BLD AUTO: 0.3 % (ref 0–0.5)
LACTATE SERPL-SCNC: 1.5 MMOL/L (ref 0.5–2.2)
LYMPHOCYTES # BLD AUTO: 0.8 K/UL (ref 1–4.8)
LYMPHOCYTES NFR BLD: 4.5 % (ref 18–48)
MAGNESIUM SERPL-MCNC: 1.9 MG/DL (ref 1.6–2.6)
MCH RBC QN AUTO: 30.7 PG (ref 27–31)
MCHC RBC AUTO-ENTMCNC: 30.9 G/DL (ref 32–36)
MCV RBC AUTO: 100 FL (ref 82–98)
MONOCYTES # BLD AUTO: 0.4 K/UL (ref 0.3–1)
MONOCYTES NFR BLD: 2.3 % (ref 4–15)
NEUTROPHILS # BLD AUTO: 16.2 K/UL (ref 1.8–7.7)
NEUTROPHILS NFR BLD: 91.7 % (ref 38–73)
NRBC BLD-RTO: 0 /100 WBC
PHOSPHATE SERPL-MCNC: 6 MG/DL (ref 2.7–4.5)
PLATELET # BLD AUTO: 299 K/UL (ref 150–450)
PMV BLD AUTO: 10.6 FL (ref 9.2–12.9)
POCT GLUCOSE: 121 MG/DL (ref 70–110)
POCT GLUCOSE: 125 MG/DL (ref 70–110)
POCT GLUCOSE: 129 MG/DL (ref 70–110)
POCT GLUCOSE: 137 MG/DL (ref 70–110)
POTASSIUM SERPL-SCNC: 5.5 MMOL/L (ref 3.5–5.1)
PROT SERPL-MCNC: 7 G/DL (ref 6–8.4)
RBC # BLD AUTO: 2.83 M/UL (ref 4.6–6.2)
SODIUM SERPL-SCNC: 138 MMOL/L (ref 136–145)
WBC # BLD AUTO: 17.72 K/UL (ref 3.9–12.7)

## 2023-01-06 PROCEDURE — 85025 COMPLETE CBC W/AUTO DIFF WBC: CPT | Mod: NTX | Performed by: STUDENT IN AN ORGANIZED HEALTH CARE EDUCATION/TRAINING PROGRAM

## 2023-01-06 PROCEDURE — 25000003 PHARM REV CODE 250: Mod: NTX | Performed by: STUDENT IN AN ORGANIZED HEALTH CARE EDUCATION/TRAINING PROGRAM

## 2023-01-06 PROCEDURE — 97165 OT EVAL LOW COMPLEX 30 MIN: CPT | Mod: NTX

## 2023-01-06 PROCEDURE — 25000003 PHARM REV CODE 250: Mod: NTX | Performed by: UROLOGY

## 2023-01-06 PROCEDURE — 83735 ASSAY OF MAGNESIUM: CPT | Mod: NTX | Performed by: STUDENT IN AN ORGANIZED HEALTH CARE EDUCATION/TRAINING PROGRAM

## 2023-01-06 PROCEDURE — 80053 COMPREHEN METABOLIC PANEL: CPT | Mod: NTX | Performed by: STUDENT IN AN ORGANIZED HEALTH CARE EDUCATION/TRAINING PROGRAM

## 2023-01-06 PROCEDURE — 99223 PR INITIAL HOSPITAL CARE,LEVL III: ICD-10-PCS | Mod: NTX,,, | Performed by: NURSE PRACTITIONER

## 2023-01-06 PROCEDURE — 84100 ASSAY OF PHOSPHORUS: CPT | Mod: NTX | Performed by: STUDENT IN AN ORGANIZED HEALTH CARE EDUCATION/TRAINING PROGRAM

## 2023-01-06 PROCEDURE — 99223 1ST HOSP IP/OBS HIGH 75: CPT | Mod: NTX,,, | Performed by: NURSE PRACTITIONER

## 2023-01-06 PROCEDURE — 80100016 HC MAINTENANCE HEMODIALYSIS: Mod: NTX

## 2023-01-06 PROCEDURE — 83605 ASSAY OF LACTIC ACID: CPT | Mod: NTX | Performed by: UROLOGY

## 2023-01-06 PROCEDURE — 63600175 PHARM REV CODE 636 W HCPCS: Mod: NTX | Performed by: STUDENT IN AN ORGANIZED HEALTH CARE EDUCATION/TRAINING PROGRAM

## 2023-01-06 PROCEDURE — 36415 COLL VENOUS BLD VENIPUNCTURE: CPT | Mod: NTX | Performed by: UROLOGY

## 2023-01-06 PROCEDURE — 36415 COLL VENOUS BLD VENIPUNCTURE: CPT | Mod: NTX | Performed by: STUDENT IN AN ORGANIZED HEALTH CARE EDUCATION/TRAINING PROGRAM

## 2023-01-06 PROCEDURE — 20600001 HC STEP DOWN PRIVATE ROOM: Mod: NTX

## 2023-01-06 RX ORDER — ASPIRIN 81 MG/1
81 TABLET ORAL DAILY
Status: DISCONTINUED | OUTPATIENT
Start: 2023-01-07 | End: 2023-01-09 | Stop reason: HOSPADM

## 2023-01-06 RX ORDER — OXYCODONE HYDROCHLORIDE 10 MG/1
10 TABLET ORAL EVERY 4 HOURS PRN
Status: DISCONTINUED | OUTPATIENT
Start: 2023-01-06 | End: 2023-01-06

## 2023-01-06 RX ORDER — OXYCODONE HYDROCHLORIDE 10 MG/1
10 TABLET ORAL EVERY 4 HOURS PRN
Status: DISCONTINUED | OUTPATIENT
Start: 2023-01-06 | End: 2023-01-09 | Stop reason: HOSPADM

## 2023-01-06 RX ORDER — MUPIROCIN 20 MG/G
OINTMENT TOPICAL 2 TIMES DAILY
Status: CANCELLED | OUTPATIENT
Start: 2023-01-06 | End: 2023-01-11

## 2023-01-06 RX ORDER — POLYETHYLENE GLYCOL 3350 17 G/17G
17 POWDER, FOR SOLUTION ORAL DAILY
Status: DISCONTINUED | OUTPATIENT
Start: 2023-01-06 | End: 2023-01-09 | Stop reason: HOSPADM

## 2023-01-06 RX ORDER — HYDRALAZINE HYDROCHLORIDE 25 MG/1
25 TABLET, FILM COATED ORAL EVERY 8 HOURS
Status: DISCONTINUED | OUTPATIENT
Start: 2023-01-06 | End: 2023-01-09 | Stop reason: HOSPADM

## 2023-01-06 RX ORDER — MUPIROCIN 20 MG/G
OINTMENT TOPICAL 2 TIMES DAILY
Status: DISCONTINUED | OUTPATIENT
Start: 2023-01-06 | End: 2023-01-09 | Stop reason: HOSPADM

## 2023-01-06 RX ORDER — METHOCARBAMOL 500 MG/1
500 TABLET, FILM COATED ORAL 4 TIMES DAILY
Status: DISCONTINUED | OUTPATIENT
Start: 2023-01-06 | End: 2023-01-09 | Stop reason: HOSPADM

## 2023-01-06 RX ADMIN — METHOCARBAMOL 500 MG: 500 TABLET ORAL at 09:01

## 2023-01-06 RX ADMIN — OXYCODONE HYDROCHLORIDE 10 MG: 10 TABLET ORAL at 09:01

## 2023-01-06 RX ADMIN — OXYCODONE HYDROCHLORIDE 10 MG: 10 TABLET ORAL at 05:01

## 2023-01-06 RX ADMIN — OXYCODONE 5 MG: 5 TABLET ORAL at 04:01

## 2023-01-06 RX ADMIN — ACETAMINOPHEN 1000 MG: 500 TABLET ORAL at 06:01

## 2023-01-06 RX ADMIN — CARVEDILOL 25 MG: 25 TABLET, FILM COATED ORAL at 06:01

## 2023-01-06 RX ADMIN — CARVEDILOL 25 MG: 25 TABLET, FILM COATED ORAL at 01:01

## 2023-01-06 RX ADMIN — MUPIROCIN: 20 OINTMENT TOPICAL at 09:01

## 2023-01-06 RX ADMIN — METHOCARBAMOL 500 MG: 500 TABLET ORAL at 12:01

## 2023-01-06 RX ADMIN — OXYCODONE HYDROCHLORIDE 15 MG: 10 TABLET ORAL at 12:01

## 2023-01-06 RX ADMIN — ACETAMINOPHEN 1000 MG: 500 TABLET ORAL at 11:01

## 2023-01-06 RX ADMIN — HEPARIN SODIUM 5000 UNITS: 5000 INJECTION INTRAVENOUS; SUBCUTANEOUS at 05:01

## 2023-01-06 RX ADMIN — HEPARIN SODIUM 5000 UNITS: 5000 INJECTION INTRAVENOUS; SUBCUTANEOUS at 09:01

## 2023-01-06 RX ADMIN — METHOCARBAMOL 500 MG: 500 TABLET ORAL at 06:01

## 2023-01-06 RX ADMIN — CARVEDILOL 25 MG: 25 TABLET, FILM COATED ORAL at 09:01

## 2023-01-06 RX ADMIN — MUPIROCIN: 20 OINTMENT TOPICAL at 12:01

## 2023-01-06 RX ADMIN — HYDRALAZINE HYDROCHLORIDE 25 MG: 25 TABLET, FILM COATED ORAL at 09:01

## 2023-01-06 RX ADMIN — AMLODIPINE BESYLATE 10 MG: 10 TABLET ORAL at 09:01

## 2023-01-06 NOTE — HPI
The patient is a 47 y.o. Black or  Male with multiple co morbidities including CAD and ESRD 2/2 HTN on HD and dialyzes MWF who [place] [facility] [place] neph[place] for a L renal mass. He has a recent history of a R nephrectomy in May 2022. He s/p procedure on yesterday with no acute issues this AM. Last HD completed on 1/4. Nephrology consulted for management of ESRD and HD treatment.

## 2023-01-06 NOTE — PLAN OF CARE
Problem: Occupational Therapy  Goal: Occupational Therapy Goal  Description: Goals to be met by: 1/20/23    Patient will increase functional independence with ADLs by performing:    UE Dressing with [place].  LE Dressing with [place].  Grooming while standing at sink with [place].  Toileting from toilet with [place] for hygiene and clothing management.   Toilet transfer to toilet with [place].  Upper extremity exercise program x15 reps per handout, with independence.    Outcome: Ongoing, Progressing

## 2023-01-06 NOTE — OP NOTE
Kofi Evans - [facility] Stepdown  Surgery Department  Operative Note       Date of Procedure: 1/5/2023     Procedure: Procedure(s) (LRB):  XI ROBOTIC NEPHRECTOMY (Left)     Surgeon(s) and Role:     * Aidan Santiago MD - Primary     * Edil Fernandez MD - Resident - Chief     * Maikel Lamb MD - Consult    Assisting Surgeon: None    Pre-Operative Diagnosis: Renal mass [N28.89]  Colonic entry with verees needle    Post-Operative Diagnosis:   Same      Anesthesia: General/Regional    Operative Findings:   Entry of verees into anterior wall of colon    Procedures:  Colotomy repair, primary    Estimated Blood Loss (EBL):  0 mL           Indications:  Haroldo Dickinson is in the OR for nephrectomy. I am called in to assess verees needle entry into the colon.    Details:  I entered the OR and there was a single trocar in with a camera and I can see a Veress needle that is entered into the anterior wall of the colon.  The colon appears healthy and there was no hematoma.  The Veress was removed and the place of entry was marked with a marking pen.  Additional trocars are placed in the robot is docked.  I examined the colon now in the robot and elected to over-sew this area with several 3-0 Vicryl sutures.  The surrounding colon appears healthy I do mobilize a bit of the peritoneal reflexion and the backside of the colon appears intact.  The case was turned back over to the primary service.    Implants: * No implants in log *    Specimens:   Specimen (24h ago, onward)       Start     Ordered    01/05/23 1451  Specimen to Pathology, Surgery Urology  Once        Comments: Pre-op Diagnosis: Renal mass [N28.89]Procedure(s):XI ROBOTIC NEPHRECTOMY Number of specimens: 1Name of specimens: 1) Left Kidney (permanent)     References:    Click here for ordering Quick Tip   Question Answer Comment   Procedure Type: Urology    Which provider would you like to cc? AIDAN SANTIAGO    Release to patient Immediate        01/05/23  1518                            Condition: Good    Disposition:  Remained in OR for completion of the case    Attestation: I was present and scrubbed for the entire procedure.

## 2023-01-06 NOTE — HPI
Haroldo Dickinson . is a 47 y.o. male presenting for left nephrectomy.       Patient has ESRD 2/2 HTN. Does HD, MWF via a right forearm AVF. He does not urinate at all. Undergoing transplant evaluation. Last HD session yesterday (1/4).     S/p right robotic nephrectomy on 5/19/22. Path resulting T1a.   No FH of kidney cancer.       Prior MRI reviewed which showed no evidence of residual disease s/p right nephrectomy. 2 indeterminate left renal lesions. 4 cm and 3 cm. Given the known prior malignancy in his right kidney coupled with solid renal masses in the left kidney, presumably this could be concerning for renal cell carcinoma and likely is renal cell carcinoma.     He had a CVA in 2013, reportedly due to uncontrolled high BP.   BP still not under good control. Only prior abdominal surgery was right robotic nephrectomy. Takes ASA 81---continued aspirin perioperatively due to CAD.     No FH of kidney cancer.

## 2023-01-06 NOTE — SUBJECTIVE & OBJECTIVE
Past Medical History:   Diagnosis Date    CAD (coronary artery disease), native coronary artery 11/21/2019    Cataract     Diabetes mellitus     Diabetic retinopathy     Dialysis patient     DM type 2 causing renal disease, not at goal     ESRD (end stage renal disease) started dialysis 01/2014 6/5/2014    Hyperparathyroidism, secondary renal 6/5/2014    Hypertension     NSTEMI (non-ST elevated myocardial infarction) 12/21/2013    Organ transplant candidate 6/5/2014    Pneumonia     Renal hypertension     Stroke        Past Surgical History:   Procedure Laterality Date    ANGIOGRAM, CORONARY, WITH LEFT HEART CATHETERIZATION N/A 7/1/2021    Procedure: Angiogram, Coronary, with Left Heart Cath;  Surgeon: Miki Zendejas MD;  Location: [facility] CATH LAB;  Service: Cardiology;  Laterality: N/A;    COLONOSCOPY N/A 5/3/2017    Procedure: COLONOSCOPY;  Surgeon: Rufino Carpenter MD;  [facility]  Service: Endoscopy;  Laterality: N/A;  pt states can only schedule on Wednesdays    COLONOSCOPY N/A 2/9/2021    Procedure: COLONOSCOPY;  Surgeon: Edil Wong MD;  [facility]  Service: Endoscopy;  Laterality: N/A;  Dialysis MWF/ labwork day of procedure  right arm aceess  per Dr. Colin pt can hold Plavix 5 days prior see note- sm  COVID test on 2/6/21 at [facility] - sm    COLONOSCOPY N/A 2/10/2021    Procedure: COLONOSCOPY;  Surgeon: Robert Ayers MD;  [facility]  Service: Endoscopy;  Laterality: N/A;  rescheduled due to poor bowel prep-BB  negative covid screening 2/6/21-BB  dialysis M-W-F-BB  okay to r/s for 2/9/21 and to hold Plavix per Dr. KIRAN Wong-BB  labs same day-BB    CORONARY STENT PLACEMENT N/A 7/25/2019    Procedure: INSERTION, STENT, CORONARY ARTERY;  Surgeon: Miki Zendejas MD;  Location: [facility] CATH LAB;  Service: Cardiology;  Laterality: N/A;    DIALYSIS FISTULA CREATION      FISTULOGRAM N/A 2/18/2019    Procedure: Fistulogram;  Surgeon: Yaneth De La Cruz MD;   Location: [facility] CATH LAB;  Service: Cardiology;  Laterality: N/A;    FISTULOGRAM Right 7/23/2019    Procedure: Fistulogram;  Surgeon: Oz Cordoba MD;  Location: [facility] CATH LAB;  Service: Cardiology;  Laterality: Right;    LAPAROSCOPIC ROBOT-ASSISTED SURGICAL REMOVAL OF KIDNEY USING DA JAIME XI Right 5/19/2022    Procedure: XI ROBOTIC NEPHRECTOMY;  Surgeon: Aidan Hendricks MD;  Location: [facility] OR [facility] FLR;  Service: Urology;  Laterality: Right;  3hrs    LAPAROSCOPIC ROBOT-ASSISTED SURGICAL REMOVAL OF KIDNEY USING DA JAIME XI Left 1/5/2023    Procedure: XI ROBOTIC NEPHRECTOMY;  Surgeon: Aidan Hendricks MD;  Location: [facility] OR [facility] FLR;  Service: Urology;  Laterality: Left;  4 hrs    LEFT HEART CATHETERIZATION Left 7/25/2019    Procedure: Left heart cath;  Surgeon: Miki Zendejas MD;  Location: [facility] CATH LAB;  Service: Cardiology;  Laterality: Left;    RETINAL LASER PROCEDURE Bilateral 2018 or 2017    Dr. Rothman    VASCULAR SURGERY         Review of patient's allergies indicates:   Allergen Reactions    No known allergies      Current Facility-Administered Medications   Medication Frequency    acetaminophen tablet 1,000 mg Q6H    amLODIPine tablet 10 mg Daily    carvediloL tablet 25 mg BID WM    heparin (porcine) injection 5,000 Units Q8H    hydrALAZINE tablet 25 mg Q8H    melatonin tablet 6 mg Nightly PRN    methocarbamoL tablet 500 mg QID    mupirocin 2 % ointment BID    ondansetron disintegrating tablet 4 mg Q6H PRN    oxyCODONE immediate release tablet 15 mg Q4H PRN    oxyCODONE immediate release tablet Tab 10 mg Q4H PRN    polyethylene glycol packet 17 g Daily     Facility-Administered Medications Ordered in Other Encounters   Medication Frequency    lidocaine (PF) 10 mg/ml (1%) injection 10 mg Once     Family History       Problem Relation (Age of Onset)    Cancer Maternal Grandfather    Cataracts Mother    Diabetes Mother    Heart disease Brother    Hypertension Mother, Father, Sister, Brother     Kidney disease Brother          Tobacco Use    Smoking status: Never    Smokeless tobacco: Never   Substance and Sexual Activity    Alcohol use: Not Currently     Comment: One drink a month    Drug use: No    Sexual activity: Yes     Partners: Female     Birth control/protection: None     Review of Systems   Constitutional:  Positive for activity change. Negative for appetite change.   HENT:  Negative for hearing loss.    Eyes:  Negative for visual disturbance.   Respiratory:  Negative for cough and shortness of breath.    Cardiovascular:  Negative for chest pain and leg swelling.   Gastrointestinal:  Positive for abdominal pain.   Genitourinary:  Positive for decreased urine volume.   Musculoskeletal:  Positive for back pain.   Skin: Negative.    Neurological:  Positive for weakness.   Psychiatric/Behavioral:  Negative for agitation, behavioral problems and confusion.    Objective:     Vital Signs (Most Recent):  Temp: 97.2 °F (36.2 °C) (01/06/23 0747)  Pulse: 83 (01/06/23 0747)  Resp: 18 (01/06/23 0907)  BP: (!) 155/70 (01/06/23 0747)  SpO2: 95 % (01/06/23 0747)   Vital Signs (24h Range):  Temp:  [94.5 °F (34.7 °C)-98 °F (36.7 °C)] 97.2 °F (36.2 °C)  Pulse:  [50-88] 83  Resp:  [14-26] 18  SpO2:  [95 %-100 %] 95 %  BP: ()/(53-91) 155/70     Weight: 76.6 kg (168 lb 14 oz) (01/05/23 2015)  Body mass index is 25.68 kg/m².  Body surface area is 1.92 meters squared.    I/O last 3 completed shifts:  In: 1200 [P.O.:100; I.V.:600; IV Piggyback:500]  Out: -     Physical Exam  Vitals and nursing note reviewed.   Constitutional:       General: He is not in acute distress.     Appearance: He is well-developed.   HENT:      Head: Normocephalic and atraumatic.      Right Ear: Hearing and external ear normal.      Left Ear: Hearing and external ear normal.      Mouth/Throat:      Mouth: Mucous membranes are moist.   Eyes:      General: No scleral icterus.     Conjunctiva/sclera: Conjunctivae normal.   Cardiovascular:       Rate and Rhythm: Normal rate and regular rhythm.      Pulses: Normal pulses.           Radial pulses are 2+ on the right side and 2+ on the left side.      Heart sounds: Normal heart sounds.      Arteriovenous access: Right arteriovenous access is present.  Pulmonary:      Effort: Pulmonary effort is normal. No respiratory distress.      Breath sounds: Normal breath sounds. No wheezing.   Abdominal:      General: Bowel sounds are normal. There is no distension.      Palpations: Abdomen is soft.      Tenderness: There is abdominal tenderness.   Musculoskeletal:         General: No swelling. Normal range of motion.      Cervical back: Normal range of motion.      Right lower leg: No edema.      Left lower leg: No edema.   Skin:     General: Skin is warm and dry.   Neurological:      General: No focal deficit present.      Mental Status: He is alert and oriented to person, place, and time. Mental status is at baseline.   Psychiatric:         Mood and Affect: Mood normal.         Behavior: Behavior normal. Behavior is cooperative.         Thought Content: Thought content normal.         Judgment: Judgment normal.       Significant Labs:  CBC:   Recent Labs   Lab 01/06/23  0502   WBC 17.72*   RBC 2.83*   HGB 8.7*   HCT 28.2*      *   MCH 30.7   MCHC 30.9*     CMP:   Recent Labs   Lab 01/06/23  0502   *   CALCIUM 9.2   ALBUMIN 2.9*   PROT 7.0      K 5.5*   CO2 24      BUN 48*   CREATININE 10.3*   ALKPHOS 89   ALT 8*   AST 14   BILITOT 0.6     All labs within the past 24 hours have been reviewed.

## 2023-01-06 NOTE — CONSULTS
[facility] Tele[facility]  Nephrology  Consult Note    Patient Name: Haroldo Dickinson  MRN: [id number]  Admission Date: 1/5/2023  Hospital Length of Stay: 1 days  Attending Provider: Aidan Hendricks MD   Primary Care Physician: Tess Ledbetter MD  Principal Problem:Left renal mass    Inpatient consult to Nephrology  Consult performed by: Avis Mancilla, GISSELL, FNP-C  Consult ordered by: Edil Fernandez MD  Reason for consult: ESRD        Subjective:     HPI: The patient is a 47 y.o. Black or  Male with multiple co morbidities including CAD and ESRD 2/2 HTN on HD and dialyzes MWF who presents to [facility] for a L robot nephrectomy for a L renal mass. He has a recent history of a R nephrectomy in May 2022. He s/p procedure on yesterday with no acute issues this AM. Last HD completed on 1/4. Nephrology consulted for management of ESRD and HD treatment.      Past Medical History:   Diagnosis Date    CAD (coronary artery disease), native coronary artery 11/21/2019    Cataract     Diabetes mellitus     Diabetic retinopathy     Dialysis patient     DM type 2 causing renal disease, not at goal     ESRD (end stage renal disease) started dialysis 01/2014 6/5/2014    Hyperparathyroidism, secondary renal 6/5/2014    Hypertension     NSTEMI (non-ST elevated myocardial infarction) 12/21/2013    Organ transplant candidate 6/5/2014    Pneumonia     Renal hypertension     Stroke        Past Surgical History:   Procedure Laterality Date    ANGIOGRAM, CORONARY, WITH LEFT HEART CATHETERIZATION N/A 7/1/2021    Procedure: Angiogram, Coronary, with Left Heart Cath;  Surgeon: Miki Zendejas MD;  Location: [facility] CATH LAB;  Service: Cardiology;  Laterality: N/A;    COLONOSCOPY N/A 5/3/2017    Procedure: COLONOSCOPY;  Surgeon: Rufino Carpenter MD;  Location: [facility] ENDO ([address] [place] [address]);  Service: Endoscopy;  Laterality: N/A;   states can only schedule on Wednesdays    COLONOSCOPY N/A 2/9/2021    Procedure:  COLONOSCOPY;  Surgeon: Edil Wong MD;  Location: [facility] ENDO (4TH FLR);  Service: Endoscopy;  Laterality: N/A;  Dialysis MWF/ labwork day of procedure  right arm aceess  per Dr. Colin pt can hold Plavix 5 days prior see note- sm  COVID test on 2/6/21 at [facility] - sm    COLONOSCOPY N/A 2/10/2021    Procedure: COLONOSCOPY;  Surgeon: Robert Ayers MD;  Location: [facility] ENDO (4TH FLR);  Service: Endoscopy;  Laterality: N/A;  rescheduled due to poor bowel prep-BB  negative covid screening 2/6/21-BB  dialysis M-W-F-BB  okay to r/s for 2/9/21 and to hold Plavix per Dr. KIRAN Wong-BB  labs same day-BB    CORONARY STENT PLACEMENT N/A 7/25/2019    Procedure: INSERTION, STENT, CORONARY ARTERY;  Surgeon: Miki Zendejas MD;  Location: [facility] CATH LAB;  Service: Cardiology;  Laterality: N/A;    DIALYSIS FISTULA CREATION      FISTULOGRAM N/A 2/18/2019    Procedure: Fistulogram;  Surgeon: Yaneth De La Cruz MD;  Location: [facility] CATH LAB;  Service: Cardiology;  Laterality: N/A;    FISTULOGRAM Right 7/23/2019    Procedure: Fistulogram;  Surgeon: Oz Cordoba MD;  Location: [facility] CATH LAB;  Service: Cardiology;  Laterality: Right;    LAPAROSCOPIC ROBOT-ASSISTED SURGICAL REMOVAL OF KIDNEY USING DA JAIME XI Right 5/19/2022    Procedure: XI ROBOTIC NEPHRECTOMY;  Surgeon: Aidan Hendricks MD;  [address]  Service: Urology;  Laterality: Right;  3hrs    LAPAROSCOPIC ROBOT-ASSISTED SURGICAL REMOVAL OF KIDNEY USING DA JAIME XI Left 1/5/2023    Procedure: XI ROBOTIC NEPHRECTOMY;  Surgeon: Aidan Hendricks MD;  Location: [facility] OR 2ND FLR;  Service: Urology;  Laterality: Left;  4 hrs    LEFT HEART CATHETERIZATION Left 7/25/2019    Procedure: Left heart cath;  Surgeon: Miki Zendejas MD;  Location: [facility] CATH LAB;  Service: Cardiology;  Laterality: Left;    RETINAL LASER PROCEDURE Bilateral 2018 or 2017    Dr. Rothman    VASCULAR SURGERY         Review of patient's allergies indicates:   Allergen Reactions     No known allergies      Current Facility-Administered Medications   Medication Frequency    acetaminophen tablet 1,000 mg Q6H    amLODIPine tablet 10 mg Daily    carvediloL tablet 25 mg BID WM    heparin (porcine) injection 5,000 Units Q8H    hydrALAZINE tablet 25 mg Q8H    melatonin tablet 6 mg Nightly PRN    methocarbamoL tablet 500 mg QID    mupirocin 2 % ointment BID    ondansetron disintegrating tablet 4 mg Q6H PRN    oxyCODONE immediate release tablet 15 mg Q4H PRN    oxyCODONE immediate release tablet Tab 10 mg Q4H PRN    polyethylene glycol packet 17 g Daily     Facility-Administered Medications Ordered in Other Encounters   Medication Frequency    lidocaine (PF) 10 mg/ml (1%) injection 10 mg Once     Family History       Problem Relation (Age of Onset)    Cancer Maternal Grandfather    Cataracts Mother    Diabetes Mother    Heart disease Brother    Hypertension Mother, Father, Sister, Brother    Kidney disease Brother          Tobacco Use    Smoking status: Never    Smokeless tobacco: Never   Substance and Sexual Activity    Alcohol use: Not Currently     Comment: One drink a month    Drug use: No    Sexual activity: Yes     Partners: Female     Birth control/protection: None     Review of Systems   Constitutional:  Positive for activity change. Negative for appetite change.   HENT:  Negative for hearing loss.    Eyes:  Negative for visual disturbance.   Respiratory:  Negative for cough and shortness of breath.    Cardiovascular:  Negative for chest pain and leg swelling.   Gastrointestinal:  Positive for abdominal pain.   Genitourinary:  Positive for decreased urine volume.   Musculoskeletal:  Positive for back pain.   Skin: Negative.    Neurological:  Positive for weakness.   Psychiatric/Behavioral:  Negative for agitation, behavioral problems and confusion.    Objective:     Vital Signs (Most Recent):  Temp: 97.2 °F (36.2 °C) (01/06/23 0747)  Pulse: 83 (01/06/23 0747)  Resp: 18  (01/06/23 0907)  BP: (!) 155/70 (01/06/23 0747)  SpO2: 95 % (01/06/23 0747)   Vital Signs (24h Range):  Temp:  [94.5 °F (34.7 °C)-98 °F (36.7 °C)] 97.2 °F (36.2 °C)  Pulse:  [50-88] 83  Resp:  [14-26] 18  SpO2:  [95 %-100 %] 95 %  BP: ()/(53-91) 155/70     Weight: 76.6 kg (168 lb 14 oz) (01/05/23 2015)  Body mass index is 25.68 kg/m².  Body surface area is 1.92 meters squared.    I/O last 3 completed shifts:  In: 1200 [P.O.:100; I.V.:600; IV Piggyback:500]  Out: -     Physical Exam  Vitals and nursing note reviewed.   Constitutional:       General: He is not in acute distress.     Appearance: He is well-developed.   HENT:      Head: Normocephalic and atraumatic.      Right Ear: Hearing and external ear normal.      Left Ear: Hearing and external ear normal.      Mouth/Throat:      Mouth: Mucous membranes are moist.   Eyes:      General: No scleral icterus.     Conjunctiva/sclera: Conjunctivae normal.   Cardiovascular:      Rate and Rhythm: Normal rate and regular rhythm.      Pulses: Normal pulses.           Radial pulses are 2+ on the right side and 2+ on the left side.      Heart sounds: Normal heart sounds.      Arteriovenous access: Right arteriovenous access is present.  Pulmonary:      Effort: Pulmonary effort is normal. No respiratory distress.      Breath sounds: Normal breath sounds. No wheezing.   Abdominal:      General: Bowel sounds are normal. There is no distension.      Palpations: Abdomen is soft.      Tenderness: There is abdominal tenderness.   Musculoskeletal:         General: No swelling. Normal range of motion.      Cervical back: Normal range of motion.      Right lower leg: No edema.      Left lower leg: No edema.   Skin:     General: Skin is warm and dry.   Neurological:      General: No focal deficit present.      Mental Status: He is alert and oriented to person, place, and time. Mental status is at baseline.   Psychiatric:         Mood and Affect: Mood normal.         Behavior:  Behavior normal. Behavior is cooperative.         Thought Content: Thought content normal.         Judgment: Judgment normal.       Significant Labs:  CBC:   Recent Labs   Lab 01/06/23  0502   WBC 17.72*   RBC 2.83*   HGB 8.7*   HCT 28.2*      *   MCH 30.7   MCHC 30.9*     CMP:   Recent Labs   Lab 01/06/23  0502   *   CALCIUM 9.2   ALBUMIN 2.9*   PROT 7.0      K 5.5*   CO2 24      BUN 48*   CREATININE 10.3*   ALKPHOS 89   ALT 8*   AST 14   BILITOT 0.6     All labs within the past 24 hours have been reviewed.      Assessment/Plan:     * Left renal mass  S/p L robotic nephrectomy on 1/5    End stage renal failure on dialysis  47 y.o. Black or  Male ESRD-HD M-W-F presents to ED on 1/5/2023 with diagnosis of: Renal mass [N28.89];Left renal mass [N28.89] s/p L robotic nephrectomy on 1/5.  Nephrology consulted for inpatient ESRD-HD management    Outpatient HD Information:  -Dialysis modality: Hemodialysis  -Outpatient HD unit: [facility]  -Nephrologist: ?  -HD TX days: Monday/Wednesday/Friday, duration of treatment: 4 hrs   -Dialysis access: RUE AV fistula   -Residual urine: Anuric   -EDW: 74.2 kg     Assessment:   - Will provide dialysis today for metabolic clearance and volume management  - Labs reviewed and dialysate to be adjusted to current labs.   - Will obtain OP dialysis records  - Continue to monitor intake and output  - Pre and Post-HD weights   - Please avoid gadolinium, fleets, phos-based laxatives, NSAIDs  - Dialysis thrice weekly unless more urgent indications arise. Will evaluate RRT requirements Daily.    Anemia of ESRD   Recent Labs   Lab 01/05/23  1602 01/06/23  0502   WBC 15.63* 17.72*   HGB 8.9* 8.7*   HCT 28.9* 28.2*    299     Lab Results   Component Value Date    FESATURATED 17 (L) 12/16/2013    FERRITIN 374 (H) 12/16/2013       - Goal in ESRD is Hgb of 10-11.   - Will request iron studies in AM to assess further needs of supplementation   -  Will review chronic care plan from outpatient dialysis center for LONNY dosing.     Mineral Bone Disease in ESRD   Lab Results   Component Value Date    .6 (H) 10/20/2022    CALCIUM 9.2 01/06/2023    PHOS 6.0 (H) 01/06/2023       - F/U PO4, Mg, Calcium. And albumin levels.   - Renal diet with protein intake goal 1.5 g/kg/d with 1 L fluid restriction   - Novasource with meals  - Continue home phos binder   - Daily renal panel so that phos and albumin is monitored daily.     Hypertension    - BP Elevated  - No lab stick or BP intake on access site.        Thank you for your consult. I will follow-up with patient. Please contact us if you have any additional questions.    Avis Mancilla DNP, FNP-C  Nephrology  [facility] - Telemetry Stepdown

## 2023-01-06 NOTE — TREATMENT PLAN
Surgical Oncology    No acute events. AF but hypertensive overnight. Has not yet gotten out of bed but says pain isn't controlled to do so. Had half a sandwich last night. No flatus or BM.     - Recovering well post op  - Currently on renal diet. No nausea or vomiting but no bowel function yet  - Remainder of care per primary  - Please call with questions or concerns. Also please notify prior to discharge    Katherine Valderrama MD  General Surgery, PGY-4  [phone]

## 2023-01-06 NOTE — PROGRESS NOTES
[facility] - Tele[facility] Stepdown  Urology  Progress Note    Patient Name: Haroldo Dickinson  MRN: [id number]  Admission Date: 1/5/2023  Hospital Length of Stay: 1 days  Code Status: Full Code   Attending Provider: Aidan Hendricks MD   Primary Care Physician: Tess Ledbetter MD    Subjective:     HPI:  Haroldo Dickinson Sr. is a 47 y.o. male presenting for left nephrectomy.       Patient has ESRD 2/2 HTN. Does HD, MWF via a right forearm AVF. He does not urinate at all. Undergoing transplant evaluation. Last HD session yesterday (1/4).     S/p right robotic nephrectomy on 5/19/22. Path resulting T1a.   No FH of kidney cancer.       Prior MRI reviewed which showed no evidence of residual disease s/p right nephrectomy. 2 indeterminate left renal lesions. 4 cm and 3 cm. Given the known prior malignancy in his right kidney coupled with solid renal masses in the left kidney, presumably this could be concerning for renal cell carcinoma and likely is renal cell carcinoma.     He had a CVA in 2013, reportedly due to uncontrolled high BP.   BP still not under good control. Only prior abdominal surgery was right robotic nephrectomy. Takes ASA 81---continued aspirin perioperatively due to CAD.     No FH of kidney cancer.        Interval History: NAEON. Hypertensive overnight. Pain not adequately controlled. Has not ambulated, tolerating diet.      Objective:     Temp:  [94.5 °F (34.7 °C)-98 °F (36.7 °C)] 98 °F (36.7 °C)  Pulse:  [50-88] 88  Resp:  [13-28] 20  SpO2:  [95 %-100 %] 100 %  BP: ()/(53-93) 146/73     Body mass index is 25.84 kg/m².           Drains       Drain  Duration                  Hemodialysis AV Fistula Right forearm -- days                    Physical Exam  Vitals reviewed.   Constitutional:       General: He is not in acute distress.     Appearance: He is well-developed. He is not diaphoretic.   HENT:      Head: Normocephalic and atraumatic.   Eyes:      General: No scleral icterus.  Cardiovascular:      Rate and  Rhythm: Normal rate.   Pulmonary:      Effort: Pulmonary effort is normal. No respiratory distress.      Breath sounds: No stridor.   Abdominal:      General: There is no distension.      Palpations: Abdomen is soft.      Comments: Appropriate tender to palpation, no peritoneal signs  Incisions c/d/i   Musculoskeletal:      Cervical back: Normal range of motion.   Skin:     General: Skin is warm and dry.   Neurological:      Mental Status: He is alert.   Psychiatric:         Behavior: Behavior normal.       Significant Labs:    BMP:  Recent Labs   Lab 01/05/23  1602      K 4.8      CO2 27   BUN 38*   CREATININE 8.9*   CALCIUM 9.0       CBC:   Recent Labs   Lab 01/05/23  1602 01/06/23  0502   WBC 15.63* 17.72*   HGB 8.9* 8.7*   HCT 28.9* 28.2*    299       Blood Culture: No results for input(s): LABBLOO in the last 168 hours.  Urine Culture: No results for input(s): LABURIN in the last 168 hours.  Urine Studies: No results for input(s): COLORU, APPEARANCEUA, PHUR, SPECGRAV, PROTEINUA, GLUCUA, KETONESU, BILIRUBINUA, OCCULTUA, NITRITE, UROBILINOGEN, LEUKOCYTESUR, RBCUA, WBCUA, BACTERIA, SQUAMEPITHEL, HYALINECASTS in the last 168 hours.    Invalid input(s): WRIGHTSUR    Significant Imaging:  All pertinent imaging results/findings from the past 24 hours have been reviewed.                    Assessment/Plan:     * Left renal mass  POD 1 s/p robotic L nephrectomy with incidental injury to anterior wall of colon primarily repaired with Dr. Lamb    - Continue renal diet  - Heparin for DVT ppx  - Maintain SCDs  - Continue prn pain and nausea control, will adjust pain regimen today for better pain control  - Continue to encourage ambulation  - Continue IS use  - lyte management per nephrology  - nephrology consulted, appreciate recs          VTE Risk Mitigation (From admission, onward)         Ordered     heparin (porcine) injection 5,000 Units  Every 8 hours         01/05/23 0530     [facility] AMANUEL hose   Until discontinued         01/05/23 1547     IP VTE HIGH RISK PATIENT  Once         01/05/23 1547     Place sequential compression device  Until discontinued         01/05/23 1547                Fili Marcelino MD  Urology  [facility] Tele[facility]

## 2023-01-06 NOTE — SUBJECTIVE & OBJECTIVE
Interval History: NAEON. Hypertensive overnight. Pain not adequately controlled. Has not ambulated, tolerating diet.      Objective:     Temp:  [94.5 °F (34.7 °C)-98 °F (36.7 °C)] 98 °F (36.7 °C)  Pulse:  [50-88] 88  Resp:  [13-28] 20  SpO2:  [95 %-100 %] 100 %  BP: ()/(53-93) 146/73     Body mass index is 25.84 kg/m².           Drains       Drain  Duration                  Hemodialysis AV Fistula Right forearm -- days                    Physical Exam  Vitals reviewed.   Constitutional:       General: He is not in acute distress.     Appearance: He is well-developed. He is not diaphoretic.   HENT:      Head: Normocephalic and atraumatic.   Eyes:      General: No scleral icterus.  Cardiovascular:      Rate and Rhythm: Normal rate.   Pulmonary:      Effort: Pulmonary effort is normal. No respiratory distress.      Breath sounds: No stridor.   Abdominal:      General: There is no distension.      Palpations: Abdomen is soft.      Comments: Appropriate tender to palpation, no peritoneal signs  Incisions c/d/i   Musculoskeletal:      Cervical back: Normal range of motion.   Skin:     General: Skin is warm and dry.   Neurological:      Mental Status: He is alert.   Psychiatric:         Behavior: Behavior normal.       Significant Labs:    BMP:  Recent Labs   Lab 01/05/23  1602      K 4.8      CO2 27   BUN 38*   CREATININE 8.9*   CALCIUM 9.0       CBC:   Recent Labs   Lab 01/05/23  1602 01/06/23  0502   WBC 15.63* 17.72*   HGB 8.9* 8.7*   HCT 28.9* 28.2*    299       Blood Culture: No results for input(s): LABBLOO in the last 168 hours.  Urine Culture: No results for input(s): LABURIN in the last 168 hours.  Urine Studies: No results for input(s): COLORU, APPEARANCEUA, PHUR, SPECGRAV, PROTEINUA, GLUCUA, KETONESU, BILIRUBINUA, OCCULTUA, NITRITE, UROBILINOGEN, LEUKOCYTESUR, RBCUA, WBCUA, BACTERIA, SQUAMEPITHEL, HYALINECASTS in the last 168 hours.    Invalid input(s): WRIGHTSUR    Significant  Imaging:  All pertinent imaging results/findings from the past 24 hours have been reviewed.

## 2023-01-06 NOTE — ASSESSMENT & PLAN NOTE
POD 1 s/p robotic L nephrectomy with incidental injury to anterior wall of colon primarily repaired with Dr. Lamb    - Continue renal diet  - Heparin for DVT ppx  - Maintain SCDs  - Continue prn pain and nausea control, will adjust pain regimen today for better pain control  - Continue to encourage ambulation  - Continue IS use  - lyte management per nephrology  - nephrology consulted, appreciate recs

## 2023-01-06 NOTE — PT/OT/SLP EVAL
Occupational Therapy   Evaluation    Name: Haroldo Dickinson  MRN: [id number]  Admitting Diagnosis: Left renal mass  Recent Surgery: Procedure(s) (LRB):  XI ROBOTIC NEPHRECTOMY (Left) 1 Day Post-Op    Recommendations:     Discharge Recommendations: home health OT  Discharge Equipment Recommendations:   (TBD)  Barriers to discharge:       Assessment:     Haroldo Dickinson is a 47 y.o. male with a medical diagnosis of Left renal mass.  He presents with fair tolerance to session on this date. Limited eval 2/2 transport arriving to bring pt to dialysis. Pt w reports of sever pain and abdominal incision site limiting ability to produce fully erect posture. Pt able to take R lateral steps w HHA however brief tolerance 2/2 pain. Performance deficits affecting function: weakness, impaired endurance, pain, impaired functional mobility, gait instability, impaired balance.    Pt motivated to return home however not at baseline for ADL and functional mobility performance in addition to concerns of fall risk and would benefit from continued OT services at this time.    Rehab Prognosis: Good; patient would benefit from acute skilled OT services to address these deficits and reach maximum level of function.       Plan:     Patient to be seen 3 x/week to address the above listed problems via self-care/home management, therapeutic activities, therapeutic exercises  Plan of Care Expires: 02/06/23  Plan of Care Reviewed with: patient    Subjective     Chief Complaint: abdominal pain  Patient/Family Comments/goals: I cant stand up straight because of the pain in my stomach.     Occupational Profile:  Living Environment: lives alone in 1st floor apt, TS combo  Previous level of function: ind w ADLs  Roles and Routines: does not work or drive  Equipment Used at Home:    Assistance upon Discharge: family able to assist stating he will be going to mothers home post discharge    Pain/Comfort:  Pain Rating 1: 10/10  Location - Side 1:  Bilateral  Location - Orientation 1: generalized  Location 1: abdomen  Pain Addressed 1: Reposition, Distraction, Cessation of Activity    Patients cultural, spiritual, Anglican conflicts given the current situation: no    Objective:     Communicated with: RN prior to session.  Patient found supine with  (no active lines) upon OT entry to room.    General Precautions: Standard, fall  Orthopedic Precautions: N/A  Braces: N/A  Respiratory Status: Room air    Occupational Performance:    Bed Mobility:    Patient completed Supine to Sit with minimum assistance  Patient completed Sit to Supine with minimum assistance   Min A required at trunk for sup>sit  Min A required at LE for sit>sup  SBA for EOB balance     Functional Mobility/Transfers:  Patient completed Sit <> Stand Transfer with contact guard assistance  with  hand-held assist   Functional Mobility: pt completed functional ambulation ~4 R sidesteps to simulate household mobility requiring CGA and HHA w decreased posture     Activities of Daily Living:  Not observed     Cognitive/Visual Perceptual:  Cognitive/Psychosocial Skills:     -       Oriented to: Person, Place, Time, and Situation   -       Follows Commands/attention:Follows multistep  commands  -       Communication: clear/fluent  -       Memory: No Deficits noted  -       Safety awareness/insight to disability: intact   -       Mood/Affect/Coping skills/emotional control: Cooperative and Pleasant    Physical Exam:  Upper Extremity Range of Motion:     -       Right Upper Extremity: WFL  -       Left Upper Extremity: WFL  Upper Extremity Strength:    -       Right Upper Extremity: WFL  -       Left Upper Extremity: WFL   Strength:    -       Right Upper Extremity: WFL  -       Left Upper Extremity: WFL    AMPAC 6 Click ADL:  AMPAC Total Score: 20    Treatment & Education:  Pt educated on scope of practice and importance of daily functional mobility.   Pt educated on safety precautions during  transfers  Pt updated on POC and discharge recc      Patient left supine with all lines intact, call button in reach, RN notified, and transport present    GOALS:   Multidisciplinary Problems       Occupational Therapy Goals          Problem: Occupational Therapy    Goal Priority Disciplines Outcome Interventions   Occupational Therapy Goal     OT, PT/OT Ongoing, Progressing    Description: Goals to be met by: 1/20/23    Patient will increase functional independence with ADLs by performing:    UE Dressing with [place].  LE Dressing with [place].  Grooming while standing at sink with [place].  Toileting from toilet with [place] for hygiene and clothing management.   Toilet transfer to toilet with [place].  Upper extremity exercise program x15 reps per handout, with independence.                         History:     Past Medical History:   Diagnosis Date    CAD (coronary artery disease), native coronary artery 11/21/2019    Cataract     Diabetes mellitus     Diabetic retinopathy     Dialysis patient     DM type 2 causing renal disease, not at goal     ESRD (end stage renal disease) started dialysis 01/2014 6/5/2014    Hyperparathyroidism, secondary renal 6/5/2014    Hypertension     NSTEMI (non-ST elevated myocardial infarction) 12/21/2013    Organ transplant candidate 6/5/2014    Pneumonia     Renal hypertension     Stroke          Past Surgical History:   Procedure Laterality Date    ANGIOGRAM, CORONARY, WITH LEFT HEART CATHETERIZATION N/A 7/1/2021    Procedure: Angiogram, Coronary, with Left Heart Cath;  Surgeon: Miki Zendejas MD;  Location: [facility] CATH LAB;  Service: Cardiology;  Laterality: N/A;    COLONOSCOPY N/A 5/3/2017    Procedure: COLONOSCOPY;  Surgeon: Rufino Carpenter MD;  Location: [address]);  Service: Endoscopy;  Laterality: N/A;  pt states can only schedule on Wednesdays    COLONOSCOPY N/A 2/9/2021    Procedure: COLONOSCOPY;  Surgeon: Edli Wong MD;  Location: [facility]  ENDO (4TH FLR);  Service: Endoscopy;  Laterality: N/A;  Dialysis MWF/ labwork day of procedure  right arm aceess  per Dr. Colin pt can hold Plavix 5 days prior see note- sm  COVID test on 2/6/21 at [facility] -     COLONOSCOPY N/A 2/10/2021    Procedure: COLONOSCOPY;  Surgeon: Robert Ayers MD;  Location: [facility] ENDO (4TH FLR);  Service: Endoscopy;  Laterality: N/A;  rescheduled due to poor bowel prep-BB  negative covid screening 2/6/21-BB  dialysis M-W-F-BB  okay to r/s for 2/9/21 and to hold Plavix per Dr. KIRAN Wong-BB  labs same day-BB    CORONARY STENT PLACEMENT N/A 7/25/2019    Procedure: INSERTION, STENT, CORONARY ARTERY;  Surgeon: Miki Zendejas MD;  Location: [facility] CATH LAB;  Service: Cardiology;  Laterality: N/A;    DIALYSIS FISTULA CREATION      FISTULOGRAM N/A 2/18/2019    Procedure: Fistulogram;  Surgeon: Yaneth De La Cruz MD;  Location: [facility] CATH LAB;  Service: Cardiology;  Laterality: N/A;    FISTULOGRAM Right 7/23/2019    Procedure: Fistulogram;  Surgeon: Oz Cordoba MD;  Location: [facility] CATH LAB;  Service: Cardiology;  Laterality: Right;    LAPAROSCOPIC ROBOT-ASSISTED SURGICAL REMOVAL OF KIDNEY USING DA JAIME XI Right 5/19/2022    Procedure: XI ROBOTIC NEPHRECTOMY;  Surgeon: Aidan Hendricks MD;  Location: [address];  Service: Urology;  Laterality: Right;  3hrs    LAPAROSCOPIC ROBOT-ASSISTED SURGICAL REMOVAL OF KIDNEY USING DA JAIME XI Left 1/5/2023    Procedure: XI ROBOTIC NEPHRECTOMY;  Surgeon: Aidan Hendricks MD;  Location: [address];  Service: Urology;  Laterality: Left;  4 hrs    LEFT HEART CATHETERIZATION Left 7/25/2019    Procedure: Left heart cath;  Surgeon: Miki Zendejas MD;  Location: [facility] CATH LAB;  Service: Cardiology;  Laterality: Left;    RETINAL LASER PROCEDURE Bilateral 2018 or 2017    Dr. Rothman    VASCULAR SURGERY         Time Tracking:     OT Date of Treatment: 01/06/23  OT Start Time: 1359  OT Stop Time: 1408  OT Total Time (min): 9 min    Billable  Minutes:Evaluation 9    1/6/2023

## 2023-01-06 NOTE — ASSESSMENT & PLAN NOTE
47 y.o. Black or  Male ESRD-HD M-W-F presents to ED on 1/5/2023 with diagnosis of: Renal mass [N28.89];Left renal mass [N28.89] s/p L robotic nephrectomy on 1/5.  Nephrology consulted for inpatient ESRD-HD management    Outpatient HD Information:  -Dialysis modality: Hemodialysis  -Outpatient HD unit: [facility]  -Nephrologist: ?  -HD TX days: Monday/Wednesday/Friday, duration of treatment: 4 hrs   -Dialysis access: RUE AV fistula   -Residual urine: Anuric   -EDW: 74.2 kg     Assessment:   - Will provide dialysis today for metabolic clearance and volume management  - Labs reviewed and dialysate to be adjusted to current labs.   - Will obtain OP dialysis records  - Continue to monitor intake and output  - Pre and Post-HD weights   - Please avoid gadolinium, fleets, phos-based laxatives, NSAIDs  - Dialysis thrice weekly unless more urgent indications arise. Will evaluate RRT requirements Daily.    Anemia of ESRD   Recent Labs   Lab 01/05/23  1602 01/06/23  0502   WBC 15.63* 17.72*   HGB 8.9* 8.7*   HCT 28.9* 28.2*    299     Lab Results   Component Value Date    FESATURATED 17 (L) 12/16/2013    FERRITIN 374 (H) 12/16/2013       - Goal in ESRD is Hgb of 10-11.   - Will request iron studies in AM to assess further needs of supplementation   - Will review chronic care plan from outpatient dialysis center for LONNY dosing.     Mineral Bone Disease in ESRD   Lab Results   Component Value Date    .6 (H) 10/20/2022    CALCIUM 9.2 01/06/2023    PHOS 6.0 (H) 01/06/2023       - F/U PO4, Mg, Calcium. And albumin levels.   - Renal diet with protein intake goal 1.5 g/kg/d with 1 L fluid restriction   - Novasource with meals  - Continue home phos binder   - Daily renal panel so that phos and albumin is monitored daily.     Hypertension    - BP Elevated  - No lab stick or BP intake on access site.

## 2023-01-07 LAB
ALBUMIN SERPL BCP-MCNC: 2.7 G/DL (ref 3.5–5.2)
ALP SERPL-CCNC: 80 U/L (ref 55–135)
ALT SERPL W/O P-5'-P-CCNC: <5 U/L (ref 10–44)
ANION GAP SERPL CALC-SCNC: 9 MMOL/L (ref 8–16)
AST SERPL-CCNC: 15 U/L (ref 10–40)
BASOPHILS # BLD AUTO: 0.06 K/UL (ref 0–0.2)
BASOPHILS NFR BLD: 0.7 % (ref 0–1.9)
BILIRUB SERPL-MCNC: 0.4 MG/DL (ref 0.1–1)
BUN SERPL-MCNC: 36 MG/DL (ref 6–20)
CALCIUM SERPL-MCNC: 9.2 MG/DL (ref 8.7–10.5)
CHLORIDE SERPL-SCNC: 101 MMOL/L (ref 95–110)
CO2 SERPL-SCNC: 25 MMOL/L (ref 23–29)
CREAT SERPL-MCNC: 8.6 MG/DL (ref 0.5–1.4)
DIFFERENTIAL METHOD: ABNORMAL
EOSINOPHIL # BLD AUTO: 0.7 K/UL (ref 0–0.5)
EOSINOPHIL NFR BLD: 7.3 % (ref 0–8)
ERYTHROCYTE [DISTWIDTH] IN BLOOD BY AUTOMATED COUNT: 14.1 % (ref 11.5–14.5)
EST. GFR  (NO RACE VARIABLE): 7.2 ML/MIN/1.73 M^2
GLUCOSE SERPL-MCNC: 103 MG/DL (ref 70–110)
HCT VFR BLD AUTO: 24.2 % (ref 40–54)
HGB BLD-MCNC: 7.3 G/DL (ref 14–18)
IMM GRANULOCYTES # BLD AUTO: 0.03 K/UL (ref 0–0.04)
IMM GRANULOCYTES NFR BLD AUTO: 0.3 % (ref 0–0.5)
LYMPHOCYTES # BLD AUTO: 1.1 K/UL (ref 1–4.8)
LYMPHOCYTES NFR BLD: 11.8 % (ref 18–48)
MAGNESIUM SERPL-MCNC: 2 MG/DL (ref 1.6–2.6)
MCH RBC QN AUTO: 30.2 PG (ref 27–31)
MCHC RBC AUTO-ENTMCNC: 30.2 G/DL (ref 32–36)
MCV RBC AUTO: 100 FL (ref 82–98)
MONOCYTES # BLD AUTO: 0.5 K/UL (ref 0.3–1)
MONOCYTES NFR BLD: 5.4 % (ref 4–15)
NEUTROPHILS # BLD AUTO: 6.7 K/UL (ref 1.8–7.7)
NEUTROPHILS NFR BLD: 74.5 % (ref 38–73)
NRBC BLD-RTO: 0 /100 WBC
PHOSPHATE SERPL-MCNC: 5.3 MG/DL (ref 2.7–4.5)
PLATELET # BLD AUTO: 255 K/UL (ref 150–450)
PMV BLD AUTO: 10.1 FL (ref 9.2–12.9)
POCT GLUCOSE: 105 MG/DL (ref 70–110)
POCT GLUCOSE: 120 MG/DL (ref 70–110)
POCT GLUCOSE: 123 MG/DL (ref 70–110)
POCT GLUCOSE: 213 MG/DL (ref 70–110)
POCT GLUCOSE: 90 MG/DL (ref 70–110)
POTASSIUM SERPL-SCNC: 4.3 MMOL/L (ref 3.5–5.1)
PROT SERPL-MCNC: 6.7 G/DL (ref 6–8.4)
RBC # BLD AUTO: 2.42 M/UL (ref 4.6–6.2)
SODIUM SERPL-SCNC: 135 MMOL/L (ref 136–145)
WBC # BLD AUTO: 9.05 K/UL (ref 3.9–12.7)

## 2023-01-07 PROCEDURE — 83735 ASSAY OF MAGNESIUM: CPT | Mod: NTX | Performed by: STUDENT IN AN ORGANIZED HEALTH CARE EDUCATION/TRAINING PROGRAM

## 2023-01-07 PROCEDURE — 36415 COLL VENOUS BLD VENIPUNCTURE: CPT | Mod: NTX | Performed by: STUDENT IN AN ORGANIZED HEALTH CARE EDUCATION/TRAINING PROGRAM

## 2023-01-07 PROCEDURE — 84100 ASSAY OF PHOSPHORUS: CPT | Mod: NTX | Performed by: STUDENT IN AN ORGANIZED HEALTH CARE EDUCATION/TRAINING PROGRAM

## 2023-01-07 PROCEDURE — 20600001 HC STEP DOWN PRIVATE ROOM: Mod: NTX

## 2023-01-07 PROCEDURE — 25000003 PHARM REV CODE 250: Mod: NTX | Performed by: STUDENT IN AN ORGANIZED HEALTH CARE EDUCATION/TRAINING PROGRAM

## 2023-01-07 PROCEDURE — 27000221 HC OXYGEN, UP TO 24 HOURS: Mod: NTX

## 2023-01-07 PROCEDURE — 94761 N-INVAS EAR/PLS OXIMETRY MLT: CPT | Mod: NTX

## 2023-01-07 PROCEDURE — 99231 PR SUBSEQUENT HOSPITAL CARE,LEVL I: ICD-10-PCS | Mod: GC,NTX,, | Performed by: UROLOGY

## 2023-01-07 PROCEDURE — 97161 PT EVAL LOW COMPLEX 20 MIN: CPT | Mod: NTX

## 2023-01-07 PROCEDURE — 99231 SBSQ HOSP IP/OBS SF/LOW 25: CPT | Mod: GC,NTX,, | Performed by: UROLOGY

## 2023-01-07 PROCEDURE — 80053 COMPREHEN METABOLIC PANEL: CPT | Mod: NTX | Performed by: STUDENT IN AN ORGANIZED HEALTH CARE EDUCATION/TRAINING PROGRAM

## 2023-01-07 PROCEDURE — 85025 COMPLETE CBC W/AUTO DIFF WBC: CPT | Mod: NTX | Performed by: STUDENT IN AN ORGANIZED HEALTH CARE EDUCATION/TRAINING PROGRAM

## 2023-01-07 PROCEDURE — 63600175 PHARM REV CODE 636 W HCPCS: Mod: NTX | Performed by: STUDENT IN AN ORGANIZED HEALTH CARE EDUCATION/TRAINING PROGRAM

## 2023-01-07 PROCEDURE — 97530 THERAPEUTIC ACTIVITIES: CPT | Mod: NTX

## 2023-01-07 RX ORDER — SODIUM CHLORIDE 9 MG/ML
INJECTION, SOLUTION INTRAVENOUS ONCE
Status: COMPLETED | OUTPATIENT
Start: 2023-01-09 | End: 2023-01-09

## 2023-01-07 RX ADMIN — Medication 6 MG: at 09:01

## 2023-01-07 RX ADMIN — METHOCARBAMOL 500 MG: 500 TABLET ORAL at 09:01

## 2023-01-07 RX ADMIN — HEPARIN SODIUM 5000 UNITS: 5000 INJECTION INTRAVENOUS; SUBCUTANEOUS at 02:01

## 2023-01-07 RX ADMIN — OXYCODONE HYDROCHLORIDE 15 MG: 10 TABLET ORAL at 09:01

## 2023-01-07 RX ADMIN — OXYCODONE HYDROCHLORIDE 15 MG: 10 TABLET ORAL at 02:01

## 2023-01-07 RX ADMIN — HEPARIN SODIUM 5000 UNITS: 5000 INJECTION INTRAVENOUS; SUBCUTANEOUS at 05:01

## 2023-01-07 RX ADMIN — HYDRALAZINE HYDROCHLORIDE 25 MG: 25 TABLET, FILM COATED ORAL at 09:01

## 2023-01-07 RX ADMIN — ASPIRIN 81 MG: 81 TABLET, COATED ORAL at 09:01

## 2023-01-07 RX ADMIN — POLYETHYLENE GLYCOL 3350 17 G: 17 POWDER, FOR SOLUTION ORAL at 09:01

## 2023-01-07 RX ADMIN — METHOCARBAMOL 500 MG: 500 TABLET ORAL at 05:01

## 2023-01-07 RX ADMIN — MUPIROCIN: 20 OINTMENT TOPICAL at 09:01

## 2023-01-07 RX ADMIN — METHOCARBAMOL 500 MG: 500 TABLET ORAL at 12:01

## 2023-01-07 RX ADMIN — CARVEDILOL 25 MG: 25 TABLET, FILM COATED ORAL at 09:01

## 2023-01-07 RX ADMIN — HYDRALAZINE HYDROCHLORIDE 25 MG: 25 TABLET, FILM COATED ORAL at 05:01

## 2023-01-07 RX ADMIN — HYDRALAZINE HYDROCHLORIDE 25 MG: 25 TABLET, FILM COATED ORAL at 02:01

## 2023-01-07 RX ADMIN — HEPARIN SODIUM 5000 UNITS: 5000 INJECTION INTRAVENOUS; SUBCUTANEOUS at 09:01

## 2023-01-07 RX ADMIN — AMLODIPINE BESYLATE 10 MG: 10 TABLET ORAL at 09:01

## 2023-01-07 RX ADMIN — OXYCODONE HYDROCHLORIDE 15 MG: 10 TABLET ORAL at 07:01

## 2023-01-07 RX ADMIN — CARVEDILOL 25 MG: 25 TABLET, FILM COATED ORAL at 05:01

## 2023-01-07 RX ADMIN — ACETAMINOPHEN 1000 MG: 500 TABLET ORAL at 05:01

## 2023-01-07 NOTE — PT/OT/SLP EVAL
"Physical Therapy Evaluation    Patient Name:  Haroldo Dickinson   MRN:  [id number]    Recommendations:     Discharge Recommendations: home health PT   Discharge Equipment Recommendations: none   Barriers to discharge: Decreased caregiver support    Assessment:     Haroldo Dickinson is a 47 y.o. male admitted with a medical diagnosis of Left renal mass.  He presents with the following impairments/functional limitations: weakness, impaired endurance, impaired functional mobility, pain, gait instability, impaired cardiopulmonary response to activity. The patient's mobility is primarily limited due to post-op abdominal pain in spite of premedication for pain. He required minimum assistance for supine to sit with support at trunk. He only tolerated gait 5' forward/backward with RW and contact guard assist, forward flexed posture 2/2 abdominal pain and pulling at incision. Per patient, he was planning to DC to his mother's home with 10 BEBE, he does not believe he could tolerate stairs due to pain. He is in agreement to have family check on him at his apt on fist floor. He would benefit from HHPT to address the above deficits.     Rehab Prognosis: Good; patient would benefit from acute skilled PT services to address these deficits and reach maximum level of function.    Recent Surgery: Procedure(s) (LRB):  XI ROBOTIC NEPHRECTOMY (Left) 2 Days Post-Op    Plan:     During this hospitalization, patient to be seen 3 x/week to address the identified rehab impairments via gait training, therapeutic activities, therapeutic exercises and progress toward the following goals:    Plan of Care Expires:  02/06/23    Subjective     Chief Complaint: "I feel sort of woozy" in standing  Patient/Family Comments/goals: return to PLOF  Pain/Comfort:  Pain Rating 1: 0/10  Location - Orientation 1: generalized  Location 1: abdomen (increased pain with mobility)  Pain Addressed 1: Reposition, Distraction, Pre-medicate for activity, Cessation of " Activity  Pain Rating Post-Intervention 1: 10/10    Patients cultural, spiritual, Nondenominational conflicts given the current situation: no    Living Environment:  The patient lives alone in ground floor apt, Tub-shower. He may discharge to his mother's house for increased assist, 10 BEBE, tub-shower.   Prior to admission, patients level of function was modified independent with occasional use of SPC or rollator for community distances.  Equipment used at home: cane, straight, rollator.  DME owned (not currently used): none.  Upon discharge, patient will have assistance from family.    Objective:     Communicated with RN prior to session.  Patient found HOB elevated with peripheral IV  upon PT entry to room.    General Precautions: Standard, fall  Orthopedic Precautions:N/A   Braces: N/A  Respiratory Status: Nasal cannula, flow 2 L/min    Exams:    Cognitive Exam  Patient is A&O x4 and follows 100% of one -step commands    Fine Motor Coordination   -       WNL     Postural Exam Patient presented with the following abnormalities:    -       Rounded shoulders  -       Forward head  -       Kyphosis  -       Posterior pelvic tilt   Sensation    -       Light touch mild decreased sensation L foot, history of stroke   Skin Integrity/Edema     -       Skin integrity: visibly intact  -       Edema: NA   R LE ROM WNL   R LE Strength 4+/5 hip flexion, knee ext/flex, and ankle DF/PF   L LE ROM WNL   L LE Strength  4/5 hip flexion, knee ext/flex, and ankle DF/PF       Functional Mobility:    Bed Mobility  Supine to Sit on the R side:  minimum assistance, assist at trunk, HOB elevated, cued for log roll and HR use  Sit to supine: minimum assistance   Transfers Sit to Stand:  contact guard assist with SPC, contact guard assist with RW from EOB   Gait  Gait Distance: 5 x2 ft forward and backward with RW  Assistance Level: contact guard assist   Description: forward flexed posture, short shuffling steps, decreased gait/standing  tolerance 2/2 pain- patient deferred further gait trial        AM-PAC 6 CLICK MOBILITY  Total Score:17       Treatment & Education:  Patient educated on role of therapy, goals of session, benefits of out of bed mobility. Patient agreeable to mobilize with therapy.  Discussed PT plan of care during hospitalization. Patient educated that they need to call for assistance to mobilize out of bed. Whiteboard updated as appropriate. Patient educated on how their diagnosis impacts their mobility within PT scope of practice.     Gait training: cued for upright posture, reciprocal steps, RW management    Patient educated on PT schedule.  Encouraged patient to ambulate, sit up in chair 3x/day to prevent deconditioning during hospitalization. Patient verbalized understanding and agreement not to mobilize without RN assist. Patient in agreement with PT POC, HHPT.      Patient safe to ambulate with RW and RN assist x1 person.     Patient left up in chair with all lines intact, call button in reach, and RN notified.    GOALS:   Multidisciplinary Problems       Physical Therapy Goals          Problem: Physical Therapy    Goal Priority Disciplines Outcome Goal Variances Interventions   Physical Therapy Goal     PT, PT/OT Ongoing, Progressing     Description: Goals to be met by:      Patient will increase functional independence with mobility by performin. Supine to sit with Modified [place]  2. Sit to supine with Modified [place]  3. Sit to stand transfer with Modified [place]  4. Gait  x 100 feet with Supervision using LRAD.   Pending DC plan, his mother's house has 10 BEBE    -5. Ascend/descend 10 stair with unilateral Handrails Contact Guard Assistance using LRAD.                          History:     Past Medical History:   Diagnosis Date    CAD (coronary artery disease), native coronary artery 2019    Cataract     Diabetes mellitus     Diabetic retinopathy     Dialysis patient     DM type 2  causing renal disease, not at goal     ESRD (end stage renal disease) started dialysis 01/2014 6/5/2014    Hyperparathyroidism, secondary renal 6/5/2014    Hypertension     NSTEMI (non-ST elevated myocardial infarction) 12/21/2013    Organ transplant candidate 6/5/2014    Pneumonia     Renal hypertension     Stroke        Past Surgical History:   Procedure Laterality Date    ANGIOGRAM, CORONARY, WITH LEFT HEART CATHETERIZATION N/A 7/1/2021    Procedure: Angiogram, Coronary, with Left Heart Cath;  Surgeon: Miki Zendejas MD;  Location: [facility] CATH LAB;  Service: Cardiology;  Laterality: N/A;    COLONOSCOPY N/A 5/3/2017    Procedure: COLONOSCOPY;  Surgeon: Rufino Carpenter MD;  Location: [facility] ENDO (4TH FLR);  Service: Endoscopy;  Laterality: N/A;  pt states can only schedule on Wednesdays    COLONOSCOPY N/A 2/9/2021    Procedure: COLONOSCOPY;  Surgeon: Edil Wong MD;  Location: [facility] ENDO (4TH FLR);  Service: Endoscopy;  Laterality: N/A;  Dialysis MWF/ labwork day of procedure  right arm aceess  per Dr. Colin pt can hold Plavix 5 days prior see note- sm  COVID test on 2/6/21 at [facility] -     COLONOSCOPY N/A 2/10/2021    Procedure: COLONOSCOPY;  Surgeon: Robert Ayers MD;  Location: [facility] ENDO (4TH FLR);  Service: Endoscopy;  Laterality: N/A;  rescheduled due to poor bowel prep-BB  negative covid screening 2/6/21-BB  dialysis M-W-F-BB  okay to r/s for 2/9/21 and to hold Plavix per Dr. KIRAN Wong-BB  labs same day-BB    CORONARY STENT PLACEMENT N/A 7/25/2019    Procedure: INSERTION, STENT, CORONARY ARTERY;  Surgeon: Miki Zendejas MD;  Location: [facility] CATH LAB;  Service: Cardiology;  Laterality: N/A;    DIALYSIS FISTULA CREATION      FISTULOGRAM N/A 2/18/2019    Procedure: Fistulogram;  Surgeon: Yaneth De La Cruz MD;  Location: [facility] CATH LAB;  Service: Cardiology;  Laterality: N/A;    FISTULOGRAM Right 7/23/2019    Procedure: Fistulogram;  Surgeon: Oz Cordoba MD;  Location: [facility] CATH LAB;  Service:  Cardiology;  Laterality: Right;    LAPAROSCOPIC ROBOT-ASSISTED SURGICAL REMOVAL OF KIDNEY USING DA JAIME XI Right 5/19/2022    Procedure: XI ROBOTIC NEPHRECTOMY;  Surgeon: Aidan Hendricks MD;  Location: [address];  Service: Urology;  Laterality: Right;  3hrs    LAPAROSCOPIC ROBOT-ASSISTED SURGICAL REMOVAL OF KIDNEY USING DA JAIME XI Left 1/5/2023    Procedure: XI ROBOTIC NEPHRECTOMY;  Surgeon: Aidan Hendricks MD;  Location: [address];  Service: Urology;  Laterality: Left;  4 hrs    LEFT HEART CATHETERIZATION Left 7/25/2019    Procedure: Left heart cath;  Surgeon: Miki Zendejas MD;  Location: [facility] CATH LAB;  Service: Cardiology;  Laterality: Left;    RETINAL LASER PROCEDURE Bilateral 2018 or 2017    Dr. Rothman    VASCULAR SURGERY         Time Tracking:     PT Received On: 01/07/23  PT Start Time: 1010     PT Stop Time: 1030  PT Total Time (min): 20 min     Billable Minutes: Evaluation 10 and Therapeutic Activity 10      01/07/2023

## 2023-01-07 NOTE — PLAN OF CARE
Problem: Physical Therapy  Goal: Physical Therapy Goal  Description: Goals to be met by:      Patient will increase functional independence with mobility by performin. Supine to sit with Modified [place]  2. Sit to supine with Modified [place]  3. Sit to stand transfer with Modified [place]  4. Gait  x 100 feet with Supervision using LRAD.   Pending DC plan, his mother's house has 10 BEBE    -5. Ascend/descend 10 stair with unilateral Handrails Contact Guard Assistance using LRAD.     Outcome: Ongoing, Progressing   Evaluation completed, initiated plan of care.   Yumiko Silverman, PT  2023

## 2023-01-07 NOTE — PLAN OF CARE
Home health referral sent in [facility] to [facility].    UPDATE (2:42 pm):  [facility] accepted patient and will start careon Wednesday, 1/11/23.

## 2023-01-07 NOTE — PROGRESS NOTES
HD TX completed.  Net fluid removed is 2 liters.  VSS.   Tolerated dialysis well.  Report given to primary nurse.  Returning to floor via bed

## 2023-01-07 NOTE — PLAN OF CARE
Problem: Adult Inpatient Plan of Care  Goal: Plan of Care Review  Outcome: Ongoing, Progressing  Goal: Patient-Specific Goal (Individualized)  Outcome: Ongoing, Progressing  Goal: Absence of Hospital-Acquired Illness or Injury  Outcome: Ongoing, Progressing  Goal: Optimal Comfort and Wellbeing  Outcome: Ongoing, Progressing  Goal: Readiness for Transition of Care  Outcome: Ongoing, Progressing     Problem: Diabetes Comorbidity  Goal: Blood Glucose Level Within Targeted Range  Outcome: Ongoing, Progressing     Problem: Device-Related Complication Risk (Hemodialysis)  Goal: Safe, Effective Therapy Delivery  Outcome: Ongoing, Progressing     Problem: Hemodynamic Instability (Hemodialysis)  Goal: Effective Tissue Perfusion  Outcome: Ongoing, Progressing     Problem: Infection (Hemodialysis)  Goal: Absence of Infection Signs and Symptoms  Outcome: Ongoing, Progressing   He had a good day.  Pain is one of his chief complaints.  I notified the urology team his pain is remaining 10/10.  At this time there is no new orders for additional pain control.    He tolerated dialysis well with the removal of 2 liters.  No sign of distress noted per dialysis team.  Safety precautions remain in place.

## 2023-01-07 NOTE — PLAN OF CARE
[facility] - Telemetry Stepdown  Initial Discharge Assessment       Primary Care Provider: Tess Ledbetter MD    Admission Diagnosis: Renal mass [N28.89]  Left renal mass [N28.89]    Admission Date: 1/5/2023  Expected Discharge Date:     Discharge Barriers Identified: None    Payor: HUMANA MANAGED MEDICARE / Plan: HUMANA SNP (SPECIAL NEEDS PLAN) / Product Type: Medicare Advantage /     Extended Emergency Contact Information  Primary Emergency Contact: Gabby Dickinson  Address: [address] [place] [address] [place]  Mobile Phone: [phone]  Relation: Mother    Discharge Plan A: Home  Discharge Plan B: Home      [id number] [place], [place] - [address]  [address]  [place] [address]  Phone: [phone] Fax: [phone]    [id number] [place], [place] - [address] [facility] [facility]  [address] [facility] [address]  Phone: [phone] Fax: [phone]      Initial Assessment (most recent)       Adult Discharge Assessment - 01/06/23 1324          Discharge Assessment    Assessment Type Discharge Planning Assessment     Confirmed/corrected address, phone number and insurance Yes     Confirmed Demographics Correct on Facesheet     Source of Information patient     Reason For Admission renal mass     People in Home alone     Do you expect to return to your current living situation? Yes     Prior to hospitilization cognitive status: Alert/Oriented     Current cognitive status: Alert/Oriented     Walking or Climbing Stairs ambulation difficulty, requires equipment     Home Layout Able to live on 1st floor     Equipment Currently Used at Home walker, rolling     Readmission within 30 days? No     Patient currently being followed by outpatient case management? No     Do you currently have service(s) that help you manage your care at home? No     Do you take prescription medications? Yes     Do you have prescription coverage? Yes      Coverage HU[employer] MANAGED MEDICARE     Do you have any problems affording any of your prescribed medications? No     Is the patient taking medications as prescribed? yes     How do you get to doctors appointments? family or friend will provide     Are you on dialysis? Yes     Dialysis Name and Scheduled days [facility]- M/W/F     Do you take coumadin? No     Discharge Plan A Home     Discharge Plan B Home     DME Needed Upon Discharge  none     Discharge Plan discussed with: Patient     Discharge Barriers Identified None

## 2023-01-07 NOTE — PROGRESS NOTES
[facility]  Urology  Progress Note    Patient Name: Haroldo Dickinson  MRN: [id number]  Admission Date: 1/5/2023  Hospital Length of Stay: 2 days  Code Status: Full Code   Attending Provider: Aidan Hendricks MD   Primary Care Physician: Tess Ledbetter MD    Subjective:     HPI:  Haroldo Dickinson Sr. is a 47 y.o. male presenting for left nephrectomy.       Patient has ESRD 2/2 HTN. Does HD, MWF via a right forearm AVF. He does not urinate at all. Undergoing transplant evaluation. Last HD session yesterday (1/4).     S/p right robotic nephrectomy on 5/19/22. Path resulting T1a.   No FH of kidney cancer.       Prior MRI reviewed which showed no evidence of residual disease s/p right nephrectomy. 2 indeterminate left renal lesions. 4 cm and 3 cm. Given the known prior malignancy in his right kidney coupled with solid renal masses in the left kidney, presumably this could be concerning for renal cell carcinoma and likely is renal cell carcinoma.     He had a CVA in 2013, reportedly due to uncontrolled high BP.   BP still not under good control. Only prior abdominal surgery was right robotic nephrectomy. Takes ASA 81---continued aspirin perioperatively due to CAD.     No FH of kidney cancer.        Interval History: No acute events overnight. Endorsing some pain which is improved somewhat by PRN pain medications. Ambulated in the halls with PT this AM and sitting up in chair. Tolerating PO w/o N/V. Denies passage of flatus or BM's. S/p HD session yesterday. Hgb 7.3 from 8.7 overnight. Afebrile, vital signs stable.    Objective:     Temp:  [98 °F (36.7 °C)-98.6 °F (37 °C)] 98.5 °F (36.9 °C)  Pulse:  [76-97] 81  Resp:  [16-20] 18  SpO2:  [91 %-96 %] 95 %  BP: (122-163)/(65-84) 131/75     Body mass index is 25.68 kg/m².           Drains       Drain  Duration                  Hemodialysis AV Fistula Right forearm -- days                    Physical Exam  Vitals and nursing note reviewed.   Constitutional:        Appearance: Normal appearance.   HENT:      Head: Normocephalic and atraumatic.      Nose: Nose normal.      Mouth/Throat:      Mouth: Mucous membranes are moist.   Eyes:      Pupils: Pupils are equal, round, and reactive to light.   Cardiovascular:      Rate and Rhythm: Normal rate.   Pulmonary:      Effort: Pulmonary effort is normal.   Abdominal:      General: Abdomen is flat. There is no distension.      Tenderness: There is no abdominal tenderness.      Comments: Left-sided port incisions clean/dry/intact  Midline incision clean/dry/intact  Right-sided port incision scars present   Skin:     General: Skin is warm and dry.   Neurological:      General: No focal deficit present.      Mental Status: He is alert and oriented to person, place, and time.   Psychiatric:         Mood and Affect: Mood normal.         Behavior: Behavior normal.         Thought Content: Thought content normal.         Judgment: Judgment normal.       Significant Labs:    BMP:  Recent Labs   Lab 01/05/23  1602 01/06/23  0502 01/07/23  0432    138 135*   K 4.8 5.5* 4.3    100 101   CO2 27 24 25   BUN 38* 48* 36*   CREATININE 8.9* 10.3* 8.6*   CALCIUM 9.0 9.2 9.2       CBC:   Recent Labs   Lab 01/05/23  1602 01/06/23  0502 01/07/23  0432   WBC 15.63* 17.72* 9.05   HGB 8.9* 8.7* 7.3*   HCT 28.9* 28.2* 24.2*    299 255       All pertinent labs results from the past 24 hours have been reviewed.    Significant Imaging:  All pertinent imaging results/findings from the past 24 hours have been reviewed.                    Assessment/Plan:     * Left renal mass  Haroldo Dickinson is a 47 y.o. male s/p robotic L nephrectomy with incidental injury to anterior wall of colon primarily repaired with Dr. Lamb. POD 2.         - F/u repeat CBC.       - PT/OT       - Will arrange for home health PT/OT  - Continue renal diet  - Heparin for DVT ppx  - Maintain SCDs  - Continue prn pain control  - Continue to encourage ambulation  - Continue  IS use  - lyte management per nephrology  - HD per nephrology, last session 1/6. Undergoes MWF outpatient.  - nephrology consulted, appreciate recs    Dispo: aim for home today pending repeat CBC and social work.          VTE Risk Mitigation (From admission, onward)           Ordered     heparin (porcine) injection 5,000 Units  Every 8 hours         01/05/23 1547     Place AMANUEL hose  Until discontinued         01/05/23 1547     IP VTE HIGH RISK PATIENT  Once         01/05/23 1547     Place sequential compression device  Until discontinued         01/05/23 1547                    Edil Fernandez MD  Urology  [facility]    Patient seen at the bedside.  We discussed strategies to handle pain in between doses of oral narcotics.

## 2023-01-07 NOTE — PLAN OF CARE
[facility] - [facility]      HOME HEALTH ORDERS  FACE TO FACE ENCOUNTER    Patient Name: Haroldo Dickinson  YOB: 1975    PCP: Tess Ledbetter MD   PCP Address: [address] / [place] [address]  PCP Phone Number: [phone]  PCP Fax: [phone]    Encounter Date: 11/22/22    Admit to Home Health    Diagnoses:  Active Hospital Problems    Diagnosis  POA    *Left renal mass [N28.89]  Yes    Cardiac murmur [R01.1]  Yes    Hypertensive retinopathy, bilateral [H35.033]  Yes    Post PTCA [Z98.61]  Not Applicable     [facility] 7-25-20      Patient on waiting list for kidney transplant [Z76.82]  Yes    CAD (coronary artery disease), native coronary artery [I25.10]  Yes    End stage renal failure on dialysis [N18.6, Z99.2]  Not Applicable    Hyperparathyroidism, secondary renal [N25.81]  Yes    Left ventricular hypertrophy due to hypertensive disease [I11.9]  Yes    Cerebral microvascular disease [I67.89]  Yes    Anemia [D64.9]  Yes    Controlled type 2 diabetes mellitus with CKD [E11.22]  Yes      Resolved Hospital Problems   No resolved problems to display.       Follow Up Appointments:  Future Appointments   Date Time Provider Department Center   1/31/2023 10:30 AM Aidan Hendricks MD [facility]       Allergies:  Review of patient's allergies indicates:   Allergen Reactions    No known allergies        Medications: Review discharge medications with patient and family and provide education.    Current Facility-Administered Medications   Medication Dose Route Frequency Provider Last Rate Last Admin    [START ON 1/9/2023] 0.9%  NaCl infusion   Intravenous Once Stanley Singh NP        acetaminophen tablet 1,000 mg  1,000 mg Oral Q6H Edil Fernandez MD   1,000 mg at 01/07/23 0511    amLODIPine tablet 10 mg  10 mg Oral Daily Edil Fernandez MD   10 mg at 01/07/23 0925    aspirin EC tablet 81 mg  81 mg Oral Daily Edil Fernandez MD   81 mg at  01/07/23 0925    carvediloL tablet 25 mg  25 mg Oral BID  Edil Fernandez MD   25 mg at 01/07/23 0925    heparin (porcine) injection 5,000 Units  5,000 Units Subcutaneous Q8H Edil Fernandez MD   5,000 Units at 01/07/23 0514    hydrALAZINE tablet 25 mg  25 mg Oral Q8H Fili Marcelino MD   25 mg at 01/07/23 0512    melatonin tablet 6 mg  6 mg Oral Nightly PRN Edil Fernandez MD        methocarbamoL tablet 500 mg  500 mg Oral QID Fili Marcelino MD   500 mg at 01/07/23 0925    mupirocin 2 % ointment   Nasal BID Aidan Hendricks MD   Given at 01/07/23 0926    ondansetron disintegrating tablet 4 mg  4 mg Oral Q6H PRN Edil Fernandez MD        oxyCODONE immediate release tablet 15 mg  15 mg Oral Q4H PRN Fili Marcelino MD   15 mg at 01/07/23 0924    oxyCODONE immediate release tablet Tab 10 mg  10 mg Oral Q4H PRN Fili Marcelino MD   10 mg at 01/06/23 1731    polyethylene glycol packet 17 g  17 g Oral Daily Edil Fernandez MD   17 g at 01/07/23 0926     Facility-Administered Medications Ordered in Other Encounters   Medication Dose Route Frequency Provider Last Rate Last Admin    lidocaine (PF) 10 mg/ml (1%) injection 10 mg  1 mL Intradermal Once Michelle Spence MD         Current Discharge Medication List        CONTINUE these medications which have NOT CHANGED    Details   amLODIPine (NORVASC) 10 MG tablet Take 1 tablet (10 mg total) by mouth once daily.  Qty: 30 tablet, Refills: 3      aspirin (ECOTRIN) 81 MG EC tablet Take 1 tablet (81 mg total) by mouth once daily.  Qty: 30 tablet, Refills: 11      AURYXIA 210 mg iron Tab Take 420 mg by mouth 3 (three) times daily with meals. Take 1 with snacks      carvediloL (COREG) 25 MG tablet TAKE 1 TABLET BY MOUTH TWICE A DAY WITH FOOD  Qty: 180 tablet, Refills: 2      cinacalcet (SENSIPAR) 90 MG Tab Take 1 tablet by mouth once daily.      hydrALAZINE (APRESOLINE) 25 MG tablet Take 1 tablet (25 mg total) by mouth every 8 (eight)  hours. for SBP > 140mm HG  Qty: 90 tablet, Refills: 11    Comments: .      sevelamer carbonate (RENVELA) 800 mg Tab Take 800 mg by mouth 3 (three) times daily with meals.               I have seen and examined this patient within the last 30 days. My clinical findings that support the need for the home health skilled services and home bound status are the following:no   Weakness/numbness causing balance and gait disturbance due to Malignancy/Cancer and Surgery making it taxing to leave home.     Diet:   renal diet    Labs:  N/A    Referrals/ Consults  Physical Therapy to evaluate and treat. Evaluate for home safety and equipment needs; Establish/upgrade home exercise program. Perform / instruct on therapeutic exercises, gait training, transfer training, and Range of Motion.  Occupational Therapy to evaluate and treat. Evaluate home environment for safety and equipment needs. Perform/Instruct on transfers, ADL training, ROM, and therapeutic exercises.    Activities:   activity as tolerated    Nursing:   Agency to admit patient within 24 hours of hospital discharge unless specified on physician order or at patient request    SN to complete comprehensive assessment including routine vital signs. Instruct on disease process and s/s of complications to report to MD. Review/verify medication list sent home with the patient at time of discharge  and instruct patient/caregiver as needed. Frequency may be adjusted depending on start of care date.     Skilled nurse to perform up to 3 visits PRN for symptoms related to diagnosis    Notify MD if SBP > 160 or < 90; DBP > 90 or < 50; HR > 120 or < 50; Temp > 101; O2 < 88%    Ok to schedule additional visits based on staff availability and patient request on consecutive days within the home health episode.    When multiple disciplines ordered:    Start of Care occurs on Sunday - Wednesday schedule remaining discipline evaluations as ordered on separate consecutive days following  the start of care.    Thursday SOC -schedule subsequent evaluations Friday and Monday the following week.     Friday - Saturday SOC - schedule subsequent discipline evaluations on consecutive days starting Monday of the following week.    For all post-discharge communication and subsequent orders please contact patient's primary care physician. If unable to reach primary care physician or do not receive response within 30 minutes, please contact the office staff for  Dr. Aidan Hendricks for clinical staff order clarification    Miscellaneous   N/A    Home Health Aide:  Physical Therapy Three times weekly and Occupational Therapy Three times weekly    Wound Care Orders  no    I certify that this patient is confined to his home and needs physical therapy and occupational therapy.

## 2023-01-07 NOTE — NURSING
He ambulated approx 20 feet in his room using a stick type cane and tolerated walking without difficulty.

## 2023-01-07 NOTE — SUBJECTIVE & OBJECTIVE
Interval History: No acute events overnight. Endorsing some pain which is improved somewhat by PRN pain medications. Ambulated in the halls with PT this AM and sitting up in chair. Tolerating PO w/o N/V. Denies passage of flatus or BM's. S/p HD session yesterday. Hgb 7.3 from 8.7 overnight. Afebrile, vital signs stable.    Objective:     Temp:  [98 °F (36.7 °C)-98.6 °F (37 °C)] 98.5 °F (36.9 °C)  Pulse:  [76-97] 81  Resp:  [16-20] 18  SpO2:  [91 %-96 %] 95 %  BP: (122-163)/(65-84) 131/75     Body mass index is 25.68 kg/m².           Drains       Drain  Duration                  Hemodialysis AV Fistula Right forearm -- days                    Physical Exam  Vitals and nursing note reviewed.   Constitutional:       Appearance: Normal appearance.   HENT:      Head: Normocephalic and atraumatic.      Nose: Nose normal.      Mouth/Throat:      Mouth: Mucous membranes are moist.   Eyes:      Pupils: Pupils are equal, round, and reactive to light.   Cardiovascular:      Rate and Rhythm: Normal rate.   Pulmonary:      Effort: Pulmonary effort is normal.   Abdominal:      General: Abdomen is flat. There is no distension.      Tenderness: There is no abdominal tenderness.      Comments: Left-sided port incisions clean/dry/intact  Midline incision clean/dry/intact  Right-sided port incision scars present   Skin:     General: Skin is warm and dry.   Neurological:      General: No focal deficit present.      Mental Status: He is alert and oriented to person, place, and time.   Psychiatric:         Mood and Affect: Mood normal.         Behavior: Behavior normal.         Thought Content: Thought content normal.         Judgment: Judgment normal.       Significant Labs:    BMP:  Recent Labs   Lab 01/05/23  1602 01/06/23  0502 01/07/23  0432    138 135*   K 4.8 5.5* 4.3    100 101   CO2 27 24 25   BUN 38* 48* 36*   CREATININE 8.9* 10.3* 8.6*   CALCIUM 9.0 9.2 9.2       CBC:   Recent Labs   Lab 01/05/23  1602  01/06/23  0502 01/07/23  0432   WBC 15.63* 17.72* 9.05   HGB 8.9* 8.7* 7.3*   HCT 28.9* 28.2* 24.2*    299 255       All pertinent labs results from the past 24 hours have been reviewed.    Significant Imaging:  All pertinent imaging results/findings from the past 24 hours have been reviewed.

## 2023-01-07 NOTE — PLAN OF CARE
Problem: Adult Inpatient Plan of Care  Goal: Plan of Care Review  Outcome: Ongoing, Progressing     Problem: Adult Inpatient Plan of Care  Goal: Patient-Specific Goal (Individualized)  Outcome: Ongoing, Progressing     Problem: Diabetes Comorbidity  Goal: Blood Glucose Level Within Targeted Range  Outcome: Ongoing, Progressing     Problem: Device-Related Complication Risk (Hemodialysis)  Goal: Safe, Effective Therapy Delivery  Outcome: Ongoing, Progressing

## 2023-01-07 NOTE — ASSESSMENT & PLAN NOTE
Haroldo Dickinson is a 47 y.o. male s/p robotic L nephrectomy with incidental injury to anterior wall of colon primarily repaired with Dr. Lamb. POD 2.         - F/u repeat CBC.       - PT/OT       - Will arrange for home health PT/OT  - Continue renal diet  - Heparin for DVT ppx  - Maintain SCDs  - Continue prn pain control  - Continue to encourage ambulation  - Continue IS use  - lyte management per nephrology  - HD per nephrology, last session 1/6. Undergoes MWF outpatient.  - nephrology consulted, appreciate recs    Dispo: aim for home today pending repeat CBC and social work.

## 2023-01-08 LAB
ALBUMIN SERPL BCP-MCNC: 2.9 G/DL (ref 3.5–5.2)
ALP SERPL-CCNC: 88 U/L (ref 55–135)
ALT SERPL W/O P-5'-P-CCNC: <5 U/L (ref 10–44)
ANION GAP SERPL CALC-SCNC: 16 MMOL/L (ref 8–16)
AST SERPL-CCNC: 14 U/L (ref 10–40)
BASOPHILS # BLD AUTO: 0.06 K/UL (ref 0–0.2)
BASOPHILS NFR BLD: 0.7 % (ref 0–1.9)
BILIRUB SERPL-MCNC: 0.4 MG/DL (ref 0.1–1)
BUN SERPL-MCNC: 55 MG/DL (ref 6–20)
CALCIUM SERPL-MCNC: 9.3 MG/DL (ref 8.7–10.5)
CHLORIDE SERPL-SCNC: 95 MMOL/L (ref 95–110)
CO2 SERPL-SCNC: 19 MMOL/L (ref 23–29)
CREAT SERPL-MCNC: 11.4 MG/DL (ref 0.5–1.4)
DIFFERENTIAL METHOD: ABNORMAL
EOSINOPHIL # BLD AUTO: 0.6 K/UL (ref 0–0.5)
EOSINOPHIL NFR BLD: 6.8 % (ref 0–8)
ERYTHROCYTE [DISTWIDTH] IN BLOOD BY AUTOMATED COUNT: 13.9 % (ref 11.5–14.5)
EST. GFR  (NO RACE VARIABLE): 5.1 ML/MIN/1.73 M^2
GLUCOSE SERPL-MCNC: 123 MG/DL (ref 70–110)
HCT VFR BLD AUTO: 24.9 % (ref 40–54)
HGB BLD-MCNC: 7.6 G/DL (ref 14–18)
IMM GRANULOCYTES # BLD AUTO: 0.03 K/UL (ref 0–0.04)
IMM GRANULOCYTES NFR BLD AUTO: 0.3 % (ref 0–0.5)
LYMPHOCYTES # BLD AUTO: 1.1 K/UL (ref 1–4.8)
LYMPHOCYTES NFR BLD: 12.3 % (ref 18–48)
MAGNESIUM SERPL-MCNC: 2 MG/DL (ref 1.6–2.6)
MCH RBC QN AUTO: 30.8 PG (ref 27–31)
MCHC RBC AUTO-ENTMCNC: 30.5 G/DL (ref 32–36)
MCV RBC AUTO: 101 FL (ref 82–98)
MONOCYTES # BLD AUTO: 0.6 K/UL (ref 0.3–1)
MONOCYTES NFR BLD: 6.6 % (ref 4–15)
NEUTROPHILS # BLD AUTO: 6.6 K/UL (ref 1.8–7.7)
NEUTROPHILS NFR BLD: 73.3 % (ref 38–73)
NRBC BLD-RTO: 0 /100 WBC
PHOSPHATE SERPL-MCNC: 6.4 MG/DL (ref 2.7–4.5)
PLATELET # BLD AUTO: 281 K/UL (ref 150–450)
PMV BLD AUTO: 10.2 FL (ref 9.2–12.9)
POCT GLUCOSE: 117 MG/DL (ref 70–110)
POTASSIUM SERPL-SCNC: 4.5 MMOL/L (ref 3.5–5.1)
PROT SERPL-MCNC: 7.5 G/DL (ref 6–8.4)
RBC # BLD AUTO: 2.47 M/UL (ref 4.6–6.2)
SODIUM SERPL-SCNC: 130 MMOL/L (ref 136–145)
WBC # BLD AUTO: 9.06 K/UL (ref 3.9–12.7)

## 2023-01-08 PROCEDURE — 36415 COLL VENOUS BLD VENIPUNCTURE: CPT | Mod: NTX | Performed by: STUDENT IN AN ORGANIZED HEALTH CARE EDUCATION/TRAINING PROGRAM

## 2023-01-08 PROCEDURE — 85025 COMPLETE CBC W/AUTO DIFF WBC: CPT | Mod: NTX | Performed by: STUDENT IN AN ORGANIZED HEALTH CARE EDUCATION/TRAINING PROGRAM

## 2023-01-08 PROCEDURE — 84100 ASSAY OF PHOSPHORUS: CPT | Mod: NTX | Performed by: STUDENT IN AN ORGANIZED HEALTH CARE EDUCATION/TRAINING PROGRAM

## 2023-01-08 PROCEDURE — 80053 COMPREHEN METABOLIC PANEL: CPT | Mod: NTX | Performed by: STUDENT IN AN ORGANIZED HEALTH CARE EDUCATION/TRAINING PROGRAM

## 2023-01-08 PROCEDURE — 25000003 PHARM REV CODE 250: Mod: NTX | Performed by: STUDENT IN AN ORGANIZED HEALTH CARE EDUCATION/TRAINING PROGRAM

## 2023-01-08 PROCEDURE — 83735 ASSAY OF MAGNESIUM: CPT | Mod: NTX | Performed by: STUDENT IN AN ORGANIZED HEALTH CARE EDUCATION/TRAINING PROGRAM

## 2023-01-08 PROCEDURE — 20600001 HC STEP DOWN PRIVATE ROOM: Mod: NTX

## 2023-01-08 PROCEDURE — 63600175 PHARM REV CODE 636 W HCPCS: Mod: NTX | Performed by: STUDENT IN AN ORGANIZED HEALTH CARE EDUCATION/TRAINING PROGRAM

## 2023-01-08 RX ORDER — POLYETHYLENE GLYCOL 3350 17 G/17G
17 POWDER, FOR SOLUTION ORAL DAILY
Qty: 238 G | Refills: 0 | Status: SHIPPED | OUTPATIENT
Start: 2023-01-08

## 2023-01-08 RX ORDER — ACETAMINOPHEN 500 MG
1000 TABLET ORAL EVERY 8 HOURS
Qty: 42 TABLET | Refills: 0 | Status: SHIPPED | OUTPATIENT
Start: 2023-01-08 | End: 2023-01-15

## 2023-01-08 RX ORDER — OXYCODONE HYDROCHLORIDE 5 MG/1
10 TABLET ORAL EVERY 4 HOURS PRN
Qty: 10 TABLET | Refills: 0 | Status: SHIPPED | OUTPATIENT
Start: 2023-01-08 | End: 2023-05-01

## 2023-01-08 RX ORDER — METHOCARBAMOL 500 MG/1
500 TABLET, FILM COATED ORAL 4 TIMES DAILY
Qty: 40 TABLET | Refills: 0 | Status: SHIPPED | OUTPATIENT
Start: 2023-01-08 | End: 2023-01-18

## 2023-01-08 RX ORDER — CINACALCET 30 MG/1
90 TABLET, FILM COATED ORAL DAILY
Status: DISCONTINUED | OUTPATIENT
Start: 2023-01-08 | End: 2023-01-09 | Stop reason: HOSPADM

## 2023-01-08 RX ORDER — SEVELAMER CARBONATE 800 MG/1
800 TABLET, FILM COATED ORAL
Status: DISCONTINUED | OUTPATIENT
Start: 2023-01-08 | End: 2023-01-09 | Stop reason: HOSPADM

## 2023-01-08 RX ADMIN — CINACALCET 90 MG: 30 TABLET, FILM COATED ORAL at 09:01

## 2023-01-08 RX ADMIN — ACETAMINOPHEN 1000 MG: 500 TABLET ORAL at 06:01

## 2023-01-08 RX ADMIN — METHOCARBAMOL 500 MG: 500 TABLET ORAL at 04:01

## 2023-01-08 RX ADMIN — HYDRALAZINE HYDROCHLORIDE 25 MG: 25 TABLET, FILM COATED ORAL at 02:01

## 2023-01-08 RX ADMIN — OXYCODONE HYDROCHLORIDE 15 MG: 10 TABLET ORAL at 09:01

## 2023-01-08 RX ADMIN — POLYETHYLENE GLYCOL 3350 17 G: 17 POWDER, FOR SOLUTION ORAL at 09:01

## 2023-01-08 RX ADMIN — HEPARIN SODIUM 5000 UNITS: 5000 INJECTION INTRAVENOUS; SUBCUTANEOUS at 02:01

## 2023-01-08 RX ADMIN — HEPARIN SODIUM 5000 UNITS: 5000 INJECTION INTRAVENOUS; SUBCUTANEOUS at 10:01

## 2023-01-08 RX ADMIN — CARVEDILOL 25 MG: 25 TABLET, FILM COATED ORAL at 04:01

## 2023-01-08 RX ADMIN — OXYCODONE HYDROCHLORIDE 15 MG: 10 TABLET ORAL at 04:01

## 2023-01-08 RX ADMIN — MUPIROCIN: 20 OINTMENT TOPICAL at 09:01

## 2023-01-08 RX ADMIN — AMLODIPINE BESYLATE 10 MG: 10 TABLET ORAL at 09:01

## 2023-01-08 RX ADMIN — SEVELAMER CARBONATE 800 MG: 800 TABLET, FILM COATED ORAL at 04:01

## 2023-01-08 RX ADMIN — CARVEDILOL 25 MG: 25 TABLET, FILM COATED ORAL at 09:01

## 2023-01-08 RX ADMIN — HEPARIN SODIUM 5000 UNITS: 5000 INJECTION INTRAVENOUS; SUBCUTANEOUS at 06:01

## 2023-01-08 RX ADMIN — HYDRALAZINE HYDROCHLORIDE 25 MG: 25 TABLET, FILM COATED ORAL at 06:01

## 2023-01-08 RX ADMIN — SEVELAMER CARBONATE 800 MG: 800 TABLET, FILM COATED ORAL at 12:01

## 2023-01-08 RX ADMIN — ACETAMINOPHEN 1000 MG: 500 TABLET ORAL at 12:01

## 2023-01-08 RX ADMIN — METHOCARBAMOL 500 MG: 500 TABLET ORAL at 09:01

## 2023-01-08 RX ADMIN — HYDRALAZINE HYDROCHLORIDE 25 MG: 25 TABLET, FILM COATED ORAL at 10:01

## 2023-01-08 RX ADMIN — METHOCARBAMOL 500 MG: 500 TABLET ORAL at 12:01

## 2023-01-08 RX ADMIN — ASPIRIN 81 MG: 81 TABLET, COATED ORAL at 09:01

## 2023-01-08 RX ADMIN — OXYCODONE HYDROCHLORIDE 15 MG: 10 TABLET ORAL at 01:01

## 2023-01-08 NOTE — PLAN OF CARE
Problem: Adult Inpatient Plan of Care  Goal: Plan of Care Review  Outcome: Ongoing, Progressing  Goal: Patient-Specific Goal (Individualized)  Outcome: Ongoing, Progressing  Goal: Absence of Hospital-Acquired Illness or Injury  Outcome: Ongoing, Progressing  Goal: Optimal Comfort and Wellbeing  Outcome: Ongoing, Progressing  Goal: Readiness for Transition of Care  Outcome: Ongoing, Progressing     Problem: Diabetes Comorbidity  Goal: Blood Glucose Level Within Targeted Range  Outcome: Ongoing, Progressing     Problem: Device-Related Complication Risk (Hemodialysis)  Goal: Safe, Effective Therapy Delivery  Outcome: Ongoing, Progressing     Problem: Hemodynamic Instability (Hemodialysis)  Goal: Effective Tissue Perfusion  Outcome: Ongoing, Progressing     Problem: Infection (Hemodialysis)  Goal: Absence of Infection Signs and Symptoms  Outcome: Ongoing, Progressing  Aox4. Safety interventions maintained. Call bell within reach. Pain mainaged. Pt on 2L of oxygen. Care plan reviewed. Pt ambulated to chair today. Care ongoing.

## 2023-01-08 NOTE — ASSESSMENT & PLAN NOTE
Haroldo Dickinson is a 47 y.o. male s/p robotic L nephrectomy with incidental injury to anterior wall of colon primarily repaired with Dr. Lamb. POD 3.         - Wean O2.       - PT/OT       - Will arrange for home health PT/OT. Spoke with social work 1/7.  - Continue renal diet  - Heparin for DVT ppx  - Maintain SCDs  - Continue prn pain control  - Continue to encourage ambulation  - Continue IS use  - lyte management per nephrology  - HD per nephrology, last session 1/6. Undergoes MWF outpatient. Will make sure he still has outpatient HD chair. Will also touch base with nephrology about necessity of HD today.   - nephrology consulted, appreciate recs    Dispo: aim for home today pending weaning of O2 requirements.

## 2023-01-08 NOTE — PROGRESS NOTES
[facility]  Urology  Progress Note    Patient Name: Haroldo Dickinson  MRN: [id number]  Admission Date: 1/5/2023  Hospital Length of Stay: 3 days  Code Status: Full Code   Attending Provider: Aidan Hendricks MD   Primary Care Physician: Tess Ledbetter MD    Subjective:     HPI:  Haroldo Dickinson Sr. is a 47 y.o. male presenting for left nephrectomy.       Patient has ESRD 2/2 HTN. Does HD, MWF via a right forearm AVF. He does not urinate at all. Undergoing transplant evaluation. Last HD session yesterday (1/4).     S/p right robotic nephrectomy on 5/19/22. Path resulting T1a.   No FH of kidney cancer.       Prior MRI reviewed which showed no evidence of residual disease s/p right nephrectomy. 2 indeterminate left renal lesions. 4 cm and 3 cm. Given the known prior malignancy in his right kidney coupled with solid renal masses in the left kidney, presumably this could be concerning for renal cell carcinoma and likely is renal cell carcinoma.     He had a CVA in 2013, reportedly due to uncontrolled high BP.   BP still not under good control. Only prior abdominal surgery was right robotic nephrectomy. Takes ASA 81---continued aspirin perioperatively due to CAD.     No FH of kidney cancer.        Interval History: No acute events overnight. Endorsing severe patient but appears comfortable.Ambulated in the room yesterday. Tolerating PO w/o N/V. Passing flatus, no BM's.Hgb 7.6 from 7.3  overnight. Afebrile, vital signs stable.    Objective:     Temp:  [97.7 °F (36.5 °C)-98.8 °F (37.1 °C)] 97.7 °F (36.5 °C)  Pulse:  [69-98] 98  Resp:  [16-18] 16  SpO2:  [90 %-96 %] 90 %  BP: (131-175)/(75-81) 175/81     Body mass index is 25.68 kg/m².           Drains       Drain  Duration                  Hemodialysis AV Fistula Right forearm -- days                    Physical Exam  Vitals and nursing note reviewed.   Constitutional:       Appearance: Normal appearance.   HENT:      Head: Normocephalic and atraumatic.       Nose: Nose normal.      Mouth/Throat:      Mouth: Mucous membranes are moist.   Eyes:      Pupils: Pupils are equal, round, and reactive to light.   Cardiovascular:      Rate and Rhythm: Normal rate.   Pulmonary:      Effort: Pulmonary effort is normal.   Abdominal:      General: Abdomen is flat. There is no distension.      Tenderness: There is no abdominal tenderness.      Comments: Left-sided port incisions clean/dry/intact  Midline incision clean/dry/intact  Right-sided port incision scars present   Skin:     General: Skin is warm and dry.   Neurological:      General: No focal deficit present.      Mental Status: He is alert and oriented to person, place, and time.   Psychiatric:         Mood and Affect: Mood normal.         Behavior: Behavior normal.         Thought Content: Thought content normal.         Judgment: Judgment normal.       Significant Labs:    BMP:  Recent Labs   Lab 01/06/23  0502 01/07/23  0432 01/08/23  0310    135* 130*   K 5.5* 4.3 4.5    101 95   CO2 24 25 19*   BUN 48* 36* 55*   CREATININE 10.3* 8.6* 11.4*   CALCIUM 9.2 9.2 9.3         CBC:   Recent Labs   Lab 01/06/23  0502 01/07/23  0432 01/08/23  0310   WBC 17.72* 9.05 9.06   HGB 8.7* 7.3* 7.6*   HCT 28.2* 24.2* 24.9*    255 281         All pertinent labs results from the past 24 hours have been reviewed.    Significant Imaging:  All pertinent imaging results/findings from the past 24 hours have been reviewed.                Review of Systems      Assessment/Plan:     * Left renal mass  Haroldo Dickinson is a 47 y.o. male s/p robotic L nephrectomy with incidental injury to anterior wall of colon primarily repaired with Dr. Lamb. POD 3.         - Wean O2.       - PT/OT       - Will arrange for home health PT/OT. Spoke with social work 1/7.  - Continue renal diet  - Heparin for DVT ppx  - Maintain SCDs  - Continue prn pain control  - Continue to encourage ambulation  - Continue IS use  - lyte management per  nephrology  - HD per nephrology, last session 1/6. Undergoes MWF outpatient. Will make sure he still has outpatient HD chair. Will also touch base with nephrology about necessity of HD today.   - nephrology consulted, appreciate recs    Dispo: aim for home today pending weaning of O2 requirements.          VTE Risk Mitigation (From admission, onward)           Ordered     heparin (porcine) injection 5,000 Units  Every 8 hours         01/05/23 1547     Place AMANUEL hose  Until discontinued         01/05/23 1547     IP VTE HIGH RISK PATIENT  Once         01/05/23 1547     Place sequential compression device  Until discontinued         01/05/23 1547                    Edil Fernandez MD  Urology  [facility] - Tele[facility]    Patient was seen at the bedside. Agree with the above plan.

## 2023-01-08 NOTE — SUBJECTIVE & OBJECTIVE
Interval History: No acute events overnight. Endorsing severe patient but appears comfortable.Ambulated in the room yesterday. Tolerating PO w/o N/V. Passing flatus, no BM's.Hgb 7.6 from 7.3  overnight. Afebrile, vital signs stable.    Objective:     Temp:  [97.7 °F (36.5 °C)-98.8 °F (37.1 °C)] 97.7 °F (36.5 °C)  Pulse:  [69-98] 98  Resp:  [16-18] 16  SpO2:  [90 %-96 %] 90 %  BP: (131-175)/(75-81) 175/81     Body mass index is 25.68 kg/m².           Drains       Drain  Duration                  Hemodialysis AV Fistula Right forearm -- days                    Physical Exam  Vitals and nursing note reviewed.   Constitutional:       Appearance: Normal appearance.   HENT:      Head: Normocephalic and atraumatic.      Nose: Nose normal.      Mouth/Throat:      Mouth: Mucous membranes are moist.   Eyes:      Pupils: Pupils are equal, round, and reactive to light.   Cardiovascular:      Rate and Rhythm: Normal rate.   Pulmonary:      Effort: Pulmonary effort is normal.   Abdominal:      General: Abdomen is flat. There is no distension.      Tenderness: There is no abdominal tenderness.      Comments: Left-sided port incisions clean/dry/intact  Midline incision clean/dry/intact  Right-sided port incision scars present   Skin:     General: Skin is warm and dry.   Neurological:      General: No focal deficit present.      Mental Status: He is alert and oriented to person, place, and time.   Psychiatric:         Mood and Affect: Mood normal.         Behavior: Behavior normal.         Thought Content: Thought content normal.         Judgment: Judgment normal.       Significant Labs:    BMP:  Recent Labs   Lab 01/06/23  0502 01/07/23  0432 01/08/23  0310    135* 130*   K 5.5* 4.3 4.5    101 95   CO2 24 25 19*   BUN 48* 36* 55*   CREATININE 10.3* 8.6* 11.4*   CALCIUM 9.2 9.2 9.3         CBC:   Recent Labs   Lab 01/06/23  0502 01/07/23  0432 01/08/23  0310   WBC 17.72* 9.05 9.06   HGB 8.7* 7.3* 7.6*   HCT 28.2* 24.2*  24.9*    255 281         All pertinent labs results from the past 24 hours have been reviewed.    Significant Imaging:  All pertinent imaging results/findings from the past 24 hours have been reviewed.                Review of Systems

## 2023-01-09 VITALS
TEMPERATURE: 99 F | HEIGHT: 68 IN | WEIGHT: 168.88 LBS | DIASTOLIC BLOOD PRESSURE: 83 MMHG | OXYGEN SATURATION: 93 % | HEART RATE: 91 BPM | BODY MASS INDEX: 25.59 KG/M2 | RESPIRATION RATE: 17 BRPM | SYSTOLIC BLOOD PRESSURE: 125 MMHG

## 2023-01-09 LAB
ALBUMIN SERPL BCP-MCNC: 2.8 G/DL (ref 3.5–5.2)
ALP SERPL-CCNC: 85 U/L (ref 55–135)
ALT SERPL W/O P-5'-P-CCNC: <5 U/L (ref 10–44)
ANION GAP SERPL CALC-SCNC: 15 MMOL/L (ref 8–16)
AST SERPL-CCNC: 13 U/L (ref 10–40)
BASOPHILS # BLD AUTO: 0.04 K/UL (ref 0–0.2)
BASOPHILS NFR BLD: 0.4 % (ref 0–1.9)
BILIRUB SERPL-MCNC: 0.4 MG/DL (ref 0.1–1)
BUN SERPL-MCNC: 77 MG/DL (ref 6–20)
CALCIUM SERPL-MCNC: 8.2 MG/DL (ref 8.7–10.5)
CHLORIDE SERPL-SCNC: 94 MMOL/L (ref 95–110)
CO2 SERPL-SCNC: 21 MMOL/L (ref 23–29)
CREAT SERPL-MCNC: 14.4 MG/DL (ref 0.5–1.4)
DIFFERENTIAL METHOD: ABNORMAL
EOSINOPHIL # BLD AUTO: 0.1 K/UL (ref 0–0.5)
EOSINOPHIL NFR BLD: 1.2 % (ref 0–8)
ERYTHROCYTE [DISTWIDTH] IN BLOOD BY AUTOMATED COUNT: 13.9 % (ref 11.5–14.5)
EST. GFR  (NO RACE VARIABLE): 3.9 ML/MIN/1.73 M^2
GLUCOSE SERPL-MCNC: 123 MG/DL (ref 70–110)
HBV SURFACE AB SER-ACNC: 27.25 MIU/ML
HBV SURFACE AB SER-ACNC: REACTIVE M[IU]/ML
HBV SURFACE AG SERPL QL IA: NORMAL
HCT VFR BLD AUTO: 23.4 % (ref 40–54)
HGB BLD-MCNC: 7.2 G/DL (ref 14–18)
IMM GRANULOCYTES # BLD AUTO: 0.05 K/UL (ref 0–0.04)
IMM GRANULOCYTES NFR BLD AUTO: 0.5 % (ref 0–0.5)
LYMPHOCYTES # BLD AUTO: 0.6 K/UL (ref 1–4.8)
LYMPHOCYTES NFR BLD: 5.4 % (ref 18–48)
MAGNESIUM SERPL-MCNC: 2 MG/DL (ref 1.6–2.6)
MCH RBC QN AUTO: 30.6 PG (ref 27–31)
MCHC RBC AUTO-ENTMCNC: 30.8 G/DL (ref 32–36)
MCV RBC AUTO: 100 FL (ref 82–98)
MONOCYTES # BLD AUTO: 0.3 K/UL (ref 0.3–1)
MONOCYTES NFR BLD: 2.5 % (ref 4–15)
NEUTROPHILS # BLD AUTO: 9.9 K/UL (ref 1.8–7.7)
NEUTROPHILS NFR BLD: 90 % (ref 38–73)
NRBC BLD-RTO: 0 /100 WBC
PHOSPHATE SERPL-MCNC: 6 MG/DL (ref 2.7–4.5)
PLATELET # BLD AUTO: 302 K/UL (ref 150–450)
PMV BLD AUTO: 10.1 FL (ref 9.2–12.9)
POCT GLUCOSE: 100 MG/DL (ref 70–110)
POCT GLUCOSE: 117 MG/DL (ref 70–110)
POCT GLUCOSE: 128 MG/DL (ref 70–110)
POCT GLUCOSE: 133 MG/DL (ref 70–110)
POCT GLUCOSE: 160 MG/DL (ref 70–110)
POTASSIUM SERPL-SCNC: 5.4 MMOL/L (ref 3.5–5.1)
PROT SERPL-MCNC: 7.3 G/DL (ref 6–8.4)
RBC # BLD AUTO: 2.35 M/UL (ref 4.6–6.2)
SODIUM SERPL-SCNC: 130 MMOL/L (ref 136–145)
WBC # BLD AUTO: 11.02 K/UL (ref 3.9–12.7)

## 2023-01-09 PROCEDURE — 80053 COMPREHEN METABOLIC PANEL: CPT | Mod: NTX | Performed by: STUDENT IN AN ORGANIZED HEALTH CARE EDUCATION/TRAINING PROGRAM

## 2023-01-09 PROCEDURE — 86706 HEP B SURFACE ANTIBODY: CPT | Mod: 91,NTX | Performed by: NURSE PRACTITIONER

## 2023-01-09 PROCEDURE — 87340 HEPATITIS B SURFACE AG IA: CPT | Mod: NTX | Performed by: NURSE PRACTITIONER

## 2023-01-09 PROCEDURE — 84100 ASSAY OF PHOSPHORUS: CPT | Mod: NTX | Performed by: STUDENT IN AN ORGANIZED HEALTH CARE EDUCATION/TRAINING PROGRAM

## 2023-01-09 PROCEDURE — 63600175 PHARM REV CODE 636 W HCPCS: Mod: NTX | Performed by: NURSE PRACTITIONER

## 2023-01-09 PROCEDURE — 63600175 PHARM REV CODE 636 W HCPCS: Mod: NTX | Performed by: STUDENT IN AN ORGANIZED HEALTH CARE EDUCATION/TRAINING PROGRAM

## 2023-01-09 PROCEDURE — 25000003 PHARM REV CODE 250: Mod: NTX | Performed by: STUDENT IN AN ORGANIZED HEALTH CARE EDUCATION/TRAINING PROGRAM

## 2023-01-09 PROCEDURE — 90935 PR HEMODIALYSIS, ONE EVALUATION: ICD-10-PCS | Mod: NTX,,, | Performed by: NURSE PRACTITIONER

## 2023-01-09 PROCEDURE — 25000003 PHARM REV CODE 250: Mod: NTX | Performed by: NURSE PRACTITIONER

## 2023-01-09 PROCEDURE — 94761 N-INVAS EAR/PLS OXIMETRY MLT: CPT | Mod: NTX

## 2023-01-09 PROCEDURE — 36415 COLL VENOUS BLD VENIPUNCTURE: CPT | Mod: NTX | Performed by: STUDENT IN AN ORGANIZED HEALTH CARE EDUCATION/TRAINING PROGRAM

## 2023-01-09 PROCEDURE — 83735 ASSAY OF MAGNESIUM: CPT | Mod: NTX | Performed by: STUDENT IN AN ORGANIZED HEALTH CARE EDUCATION/TRAINING PROGRAM

## 2023-01-09 PROCEDURE — 85025 COMPLETE CBC W/AUTO DIFF WBC: CPT | Mod: NTX | Performed by: STUDENT IN AN ORGANIZED HEALTH CARE EDUCATION/TRAINING PROGRAM

## 2023-01-09 PROCEDURE — 90935 HEMODIALYSIS ONE EVALUATION: CPT | Mod: NTX,,, | Performed by: NURSE PRACTITIONER

## 2023-01-09 PROCEDURE — 80100016 HC MAINTENANCE HEMODIALYSIS: Mod: NTX

## 2023-01-09 PROCEDURE — 99900035 HC TECH TIME PER 15 MIN (STAT): Mod: NTX

## 2023-01-09 PROCEDURE — 97116 GAIT TRAINING THERAPY: CPT | Mod: NTX,CQ

## 2023-01-09 PROCEDURE — 97530 THERAPEUTIC ACTIVITIES: CPT | Mod: NTX,CQ

## 2023-01-09 RX ORDER — IBUPROFEN 200 MG
24 TABLET ORAL
Status: DISCONTINUED | OUTPATIENT
Start: 2023-01-09 | End: 2023-01-09

## 2023-01-09 RX ORDER — GLUCAGON 1 MG
1 KIT INJECTION
Status: DISCONTINUED | OUTPATIENT
Start: 2023-01-09 | End: 2023-01-09

## 2023-01-09 RX ORDER — IBUPROFEN 200 MG
16 TABLET ORAL
Status: DISCONTINUED | OUTPATIENT
Start: 2023-01-09 | End: 2023-01-09

## 2023-01-09 RX ORDER — INSULIN ASPART 100 [IU]/ML
0-5 INJECTION, SOLUTION INTRAVENOUS; SUBCUTANEOUS
Status: DISCONTINUED | OUTPATIENT
Start: 2023-01-09 | End: 2023-01-09

## 2023-01-09 RX ADMIN — MUPIROCIN: 20 OINTMENT TOPICAL at 12:01

## 2023-01-09 RX ADMIN — AMLODIPINE BESYLATE 10 MG: 10 TABLET ORAL at 12:01

## 2023-01-09 RX ADMIN — ACETAMINOPHEN 1000 MG: 500 TABLET ORAL at 06:01

## 2023-01-09 RX ADMIN — ASPIRIN 81 MG: 81 TABLET, COATED ORAL at 12:01

## 2023-01-09 RX ADMIN — ACETAMINOPHEN 1000 MG: 500 TABLET ORAL at 12:01

## 2023-01-09 RX ADMIN — SEVELAMER CARBONATE 800 MG: 800 TABLET, FILM COATED ORAL at 04:01

## 2023-01-09 RX ADMIN — METHOCARBAMOL 500 MG: 500 TABLET ORAL at 04:01

## 2023-01-09 RX ADMIN — HEPARIN SODIUM 5000 UNITS: 5000 INJECTION INTRAVENOUS; SUBCUTANEOUS at 02:01

## 2023-01-09 RX ADMIN — HYDRALAZINE HYDROCHLORIDE 25 MG: 25 TABLET, FILM COATED ORAL at 06:01

## 2023-01-09 RX ADMIN — CARVEDILOL 25 MG: 25 TABLET, FILM COATED ORAL at 04:01

## 2023-01-09 RX ADMIN — POLYETHYLENE GLYCOL 3350 17 G: 17 POWDER, FOR SOLUTION ORAL at 12:01

## 2023-01-09 RX ADMIN — HEPARIN SODIUM 5000 UNITS: 5000 INJECTION INTRAVENOUS; SUBCUTANEOUS at 06:01

## 2023-01-09 RX ADMIN — HYDRALAZINE HYDROCHLORIDE 25 MG: 25 TABLET, FILM COATED ORAL at 02:01

## 2023-01-09 RX ADMIN — SEVELAMER CARBONATE 800 MG: 800 TABLET, FILM COATED ORAL at 12:01

## 2023-01-09 RX ADMIN — METHOCARBAMOL 500 MG: 500 TABLET ORAL at 12:01

## 2023-01-09 RX ADMIN — OXYCODONE HYDROCHLORIDE 15 MG: 10 TABLET ORAL at 09:01

## 2023-01-09 RX ADMIN — CINACALCET 90 MG: 30 TABLET, FILM COATED ORAL at 12:01

## 2023-01-09 RX ADMIN — ACETAMINOPHEN 1000 MG: 500 TABLET ORAL at 04:01

## 2023-01-09 RX ADMIN — ERYTHROPOIETIN 7700 UNITS: 10000 INJECTION, SOLUTION INTRAVENOUS; SUBCUTANEOUS at 11:01

## 2023-01-09 RX ADMIN — SODIUM CHLORIDE: 9 INJECTION, SOLUTION INTRAVENOUS at 08:01

## 2023-01-09 NOTE — PROGRESS NOTES
Patient arrived in a bed to dialysis unit.   Report received as documented in PER Handoff on Doc Flowsheet.  VS's per Doc Flowsheet.    ESRD on MWF schedule.  Maintenance Hemodialysis initiated using the following:    Dialysis Access: RFA AVF    Needle size: 15 gauge X2  Insertion with no complications.    Will Maintain telemetry and blood pressure monitoring throughout treatment.  Refer to flowsheet and MAR for details.

## 2023-01-09 NOTE — PROGRESS NOTES
[facility]    Haroldo Dickinson is a 47 y.o. male currently on hemodialysis for removal of uremic toxins and volume.     Patient seen and evaluated on hemodialysis, tolerating treatment, see HD flowsheet for vitals and assessments.    No Hypotension, chest pain, shortness of breath, cramping, nausea or vomiting.      Labs have been reviewed and the dialysate bath has been adjusted.     Labs:     Recent Labs   Lab 01/07/23 0432 01/08/23 0310 01/09/23  0446   * 130* 130*   K 4.3 4.5 5.4*    95 94*   CO2 25 19* 21*   BUN 36* 55* 77*   CREATININE 8.6* 11.4* 14.4*   CALCIUM 9.2 9.3 8.2*   PHOS 5.3* 6.4* 6.0*     Recent Labs   Lab 01/07/23 0432 01/08/23 0310 01/09/23  0446   WBC 9.05 9.06 11.02   HGB 7.3* 7.6* 7.2*   HCT 24.2* 24.9* 23.4*    281 302     Lab Results   Component Value Date    FESATURATED 17 (L) 12/16/2013    FERRITIN 374 (H) 12/16/2013        Assessment/Plan      Ultrafiltration goal: Liters: 3L. Duration:  3.5 hrs     Monday/Wednesday/Friday    - Seen on dialysis today, tolerating session with current UFR, no complications.    -  s/p robotic L nephrectomy this admission   - Will attempt pre/post weights with HD to prevent hemodynamic instability.   - Encouraged patient to limit fluid intake to 32 oz per day.   - No lab stick/BP intake on access site  - Continue to monitor intake and output, daily weights   - Please avoid gadolinium, fleets, phos-based laxatives, NSAIDs  - Will follow closely and continue dialysis treatments while in-patient    Anemia  - Hgb 7.2, Will start LONNY with dialysis treatments  - Will request iron studies in AM to assess further needs of supplementation     BMM  - Renal diet with protein intake goal 1.5 g/kg/d if appropriate   - Novasource with meals   - F/U PO4, Mg, Calcium. And albumin levels.   - Phos 6.0. Please continue Severlamer.     HTN  - BP Elevated  - Goal for BP <140mmHg SBP and <90 mmHg DBP. Maintain MAP > 65 mmHg.  -  Continue home antihypertensive regimen; adjust as needed.       Avis Mancilla DNP, APRN, FNP-C  Nephrology Department  Pager:  [phone]

## 2023-01-09 NOTE — PLAN OF CARE
CM spoke with bedside nurse regarding discharge plan. Patient was scheduled to be seen by home health tomorrow. Discharge was canceled. CHETAN sent [facility]port message to [facility] notified patient's discharge canceled.    CM/ staff will continue to follow and assist with needs.      Piedad Donohue RN  Weekend  - [facility] Kofi-jorge  [id number]

## 2023-01-09 NOTE — SUBJECTIVE & OBJECTIVE
Interval History: Patient complaining of 8/10 pain this am, however, resting comfortably in bed. States he has ambulated in his room but not in halls. Minimal IS use. Denies SOB. AFVSS.    Objective:     Temp:  [97.7 °F (36.5 °C)-98.8 °F (37.1 °C)] 98.4 °F (36.9 °C)  Pulse:  [78-98] 79  Resp:  [16-20] 18  SpO2:  [85 %-96 %] 92 %  BP: (133-175)/(69-81) 169/78     Body mass index is 25.68 kg/m².           Drains       Drain  Duration                  Hemodialysis AV Fistula Right forearm -- days                    Physical Exam  Vitals and nursing note reviewed.   Constitutional:       Appearance: Normal appearance.   HENT:      Head: Normocephalic and atraumatic.      Nose: Nose normal.      Mouth/Throat:      Mouth: Mucous membranes are moist.   Eyes:      Pupils: Pupils are equal, round, and reactive to light.   Cardiovascular:      Rate and Rhythm: Normal rate.   Pulmonary:      Effort: Pulmonary effort is normal.   Abdominal:      General: Abdomen is flat. There is no distension.      Tenderness: There is no abdominal tenderness.      Comments: Left-sided port incisions clean/dry/intact  Midline incision clean/dry/intact  Right-sided port incision scars present   Skin:     General: Skin is warm and dry.   Neurological:      General: No focal deficit present.      Mental Status: He is alert and oriented to person, place, and time.   Psychiatric:         Mood and Affect: Mood normal.         Behavior: Behavior normal.         Thought Content: Thought content normal.         Judgment: Judgment normal.       Significant Labs:    BMP:  Recent Labs   Lab 01/07/23 0432 01/08/23 0310 01/09/23  0446   * 130* 130*   K 4.3 4.5 5.4*    95 94*   CO2 25 19* 21*   BUN 36* 55* 77*   CREATININE 8.6* 11.4* 14.4*   CALCIUM 9.2 9.3 8.2*       CBC:   Recent Labs   Lab 01/07/23 0432 01/08/23 0310 01/09/23  0446   WBC 9.05 9.06 11.02   HGB 7.3* 7.6* 7.2*   HCT 24.2* 24.9* 23.4*    281 302       Urine Studies: No  results for input(s): COLORU, APPEARANCEUA, PHUR, SPECGRAV, PROTEINUA, GLUCUA, KETONESU, BILIRUBINUA, OCCULTUA, NITRITE, UROBILINOGEN, LEUKOCYTESUR, RBCUA, WBCUA, BACTERIA, SQUAMEPITHEL, HYALINECASTS in the last 168 hours.    Invalid input(s): MIGUEL    Significant Imaging:  All pertinent imaging results/findings from the past 24 hours have been reviewed.

## 2023-01-09 NOTE — PT/OT/SLP PROGRESS
"Physical Therapy Treatment    Patient Name:  Haroldo Dickinson   MRN:  [id number]    Recommendations:     Discharge Recommendations: home health PT  Discharge Equipment Recommendations: none  Barriers to discharge: Decreased caregiver support    Assessment:     Haroldo Dickinson is a 47 y.o. male admitted with a medical diagnosis of Left renal mass.  He presents with the following impairments/functional limitations: weakness, impaired endurance, impaired functional mobility, gait instability, impaired cardiopulmonary response to activity, pain. Pt agreed to Pt upon entry. Pt was able to safely perform bed mobility, t/f bed, and gait training in room. Pt stated he was feeling weak after dialysis however would try to walk using RW. Pt's oxygen was measured @ ~88% and oxygen tank was used while ambulation. Pt required one seated rest break during gait training . Pt is safe to climb stairs however should pace activity to prevent overexertion. Pt will cont to benefit from skilled acute PT to improve endurance to perform functional tasks.    Rehab Prognosis: Good; patient would benefit from acute skilled PT services to address these deficits and reach maximum level of function.    Recent Surgery: Procedure(s) (LRB):  XI ROBOTIC NEPHRECTOMY (Left) 4 Days Post-Op    Plan:     During this hospitalization, patient to be seen 3 x/week to address the identified rehab impairments via gait training, therapeutic activities, therapeutic exercises and progress toward the following goals:    Plan of Care Expires:  02/06/23    Subjective     Chief Complaint: None  Patient/Family Comments/goals: "Do you think I did good"  Pain/Comfort:  Pain Rating 1: 5/10  Location - Side 1: Bilateral  Location - Orientation 1: anterior  Location 1: abdomen  Pain Addressed 1: Reposition, Nurse notified, Cessation of Activity      Objective:     Communicated with nurse prior to session.  Patient found HOB elevated with oxygen upon PT entry to room.     General " Precautions: Standard, fall  Orthopedic Precautions: N/A  Braces: N/A  Respiratory Status: Nasal cannula, flow 2 L/min     Functional Mobility:  Bed Mobility:     Rolling Left:  supervision  Rolling Right: supervision  Scooting: supervision  Bridging: supervision  Supine to Sit: stand by assistance  Sit to Supine: stand by assistance  Transfers:     Sit to Stand:  stand by assistance with rolling walker  Gait: 24*2 ft in room w/ RW cga one seated rest break, slow nita, decreased step length  Balance: Good dynamic standing balance w/ RW      AM-PAC 6 CLICK MOBILITY  Turning over in bed (including adjusting bedclothes, sheets and blankets)?: 3  Sitting down on and standing up from a chair with arms (e.g., wheelchair, bedside commode, etc.): 4  Moving from lying on back to sitting on the side of the bed?: 4  Moving to and from a bed to a chair (including a wheelchair)?: 3  Need to walk in hospital room?: 4  Climbing 3-5 steps with a railing?:  (not done)       Treatment & Education:  Pt was educated on PT goals and importance to pace stair climbing activity to prevent over exertion.    Patient left HOB elevated with all lines intact, call button in reach, bed alarm on, and nurse notified..    GOALS:   Multidisciplinary Problems       Physical Therapy Goals          Problem: Physical Therapy    Goal Priority Disciplines Outcome Goal Variances Interventions   Physical Therapy Goal     PT, PT/OT Ongoing, Progressing     Description: Goals to be met by:      Patient will increase functional independence with mobility by performin. Supine to sit with Modified Crisp  2. Sit to supine with Modified Crisp  3. Sit to stand transfer with Modified Crisp  4. Gait  x 100 feet with Supervision using LRAD.   Pending DC plan, his mother's house has 10 BEBE    -5. Ascend/descend 10 stair with unilateral Handrails Contact Guard Assistance using LRAD.                          Time Tracking:     PT  Received On: 01/09/23  PT Start Time: 1526     PT Stop Time: 1600  PT Total Time (min): 34 min     Billable Minutes: Gait Training 17 and Therapeutic Activity 17    Treatment Type: Treatment  PT/PTA: PTA     PTA Visit Number: 1     01/09/2023

## 2023-01-09 NOTE — PROGRESS NOTES
Hemodialysis treatment completed.    Treatment time received: 3.5 hours    Net fluid removed: 3 liters    Medications received: epoetin and oxycodone     Tolerated Treatment well. VSS. No acute distress.    Blood returned and needles X2 removed. Pressure held till hemostasis obtained.  Placed gauze and tape dressing to site.  Fistual with continued bruit and thrill post treatment.    Report given as documented in PER Handoff on Doc Flowsheet  Refer to flowsheet and MAR for details.  Patient transported back in bed from Dialysis to primary unit.

## 2023-01-09 NOTE — PT/OT/SLP PROGRESS
Occupational Therapy      Patient Name:  Haroldo Dickinson   MRN:  [id number]    Patient not seen today secondary to Dialysis. Will follow-up per POC.    1/9/2023

## 2023-01-09 NOTE — ASSESSMENT & PLAN NOTE
Haroldo Dickinson is a 47 y.o. male s/p robotic L nephrectomy with incidental injury to anterior wall of colon primarily repaired with Dr. Lamb. POD 3.         - Wean O2.       - PT/OT       - Will arrange for home health PT/OT. Spoke with social work 1/7.  - Continue renal diet  - Heparin for DVT ppx  - Maintain SCDs  - Continue prn pain control  - Continue to encourage ambulation in hallways  - Continue IS use  - lyte management per nephrology  - HD per nephrology, last session 1/6. Undergoes MWF outpatient. Will make sure he still has outpatient HD chair. Will also touch base with nephrology about necessity of HD today.   - nephrology consulted, appreciate recs    Dispo: aim for home today pending weaning of O2 requirements. Possible PM discharge

## 2023-01-09 NOTE — DISCHARGE INSTRUCTIONS
What to expect with your Nephrectomy.  [facility]  Updated 06/10/2011  After surgery  You may or may not have a drain that is shaped like a grenade and put to suction  This drain usually may or may not come out on Post op day 1. If you go home with a drain, the nurses will teach you how to record the output and you will come back 3-5 days after you leave to have the drain removed in clinic.  You will have a catheter after your surgery. It should come out the next day and you should be able to void on your own before you leave. If you are a male and on a BPH medicine, we will need to make sure you restart that the night of your surgery.  The night of surgery we expect and hope that you will:  Walk - walking helps get the bowels moving. Also after your surgery, you are at a risk for a deep venous thrombosis (which is a clot in the legs that can form by remaining inactive or still for extended periods of time) and this can travel to your lungs and make you feel short of breath. This is a very serious condition. Walking helps prevent a DVT from occurring.  Eat - you do not have to eat a whole meal, but we want to make sure you can tolerate liquid and/or solid food without nausea and vomiting  Use your incentive spirometer - this is the breathing apparatus that helps you expand your lungs. If and when you have pain you will not want to take deep breaths. But if you dont take deep breaths, you are at risk for pneumonia. The incentive spirometer will help prevent this from occurring by expanding your lungs.  Symptoms you may experience immediately post-op:  Bloating and/or shoulder pain if you had a laparoscopic procedure - when we do this operation, we fill up your abdominal cavity with gas to better help us visualize the organs and allow our instruments to fit. After the surgery, not all the air can be removed and your body will eventually absorb this small amount of air. However this can make you feel bloated. In  addition, when you sit up, the air can sit right under a muscle (the diaphragm) which has connecting nerves to the shoulders, which could explain why you have shoulder pain.  Do not expect necessarily to have gas or to have a bowel movement - this goes along with the bloating, you may feel like you want to pass gas or have a bowel movement but you cant. This is normal and you will feel like this for a couple days. There are no pills to help with this. Small walks throughout the day should help with this.  Pain - your pain should be able to be controlled with medicines by mouth that we prescribe. It is important for you to tell us if you are on any pain medications at home before the surgery as you may need stronger pain meds while in the hospital.  You can go home when:  Pain is controlled with medicines by mouth  You are able to walk without difficulty or pain  You are tolerating a regulating diet  Your are voiding. If you cannot void we may have to replace a catheter and you will follow up 3-5 days after you leave to have a voiding trial. The nurses will teach you how to care for your catheter.  When you go home:  Activity  Continue to walk - small walks throughout the day are better than one long walk.   Do not lift anything greater than 8 pounds for 6 weeks - we want your abdominal wall muscles to heal.  Bowel Movements - Do not strain to have a bowel movement - the pain medicines will make you constipated. That is why we also ask you to take colace 2-3 x per day to help keep your bowels regular. If you are still having trouble, then you can also add Miralax once a day. Do not take any stool softeners if you are having diarrhea.  Drain - If you have a drain (not your catheter, but a separate drain) then record the output and bring it with you to your next appointment  Smoking - If you smoke, we encourage you STOP. Smoking interferes with the healing process and your prolong your healing with continued  smoking.  Driving - Do not drive while you are on pain meds or with your catheter in place.  Bathing - If you do not have a drain, you can shower 48 hours after your surgery. If you do have a drain, sponge bathe only until the drain is out.  Dressing - you can remove the dressings if there is no drainage or change them as needed if there is. The little sterile band-aid strips will fall off on their own in 10-14 days. If they have not fallen off then you can remove them yourself.  Restarting medicines -especially blood thinners (Aspirin, Plavix, Coumadin), Fish Oil. Discuss this with your physician prior to discharge.  When to return to the ER  Fever - If you have a fever >101.5, this could be due to a number of reasons such as infection of the urine or incision. If your catheter has been removed, you could possibly have a leak. It would be best to come to the ER so they can better evaluate you.  Severe pain - pain is expected, but severe or new onset of pain is not normal.   Inability to tolerate food or liquid with nausea and vomiting - it would be best to go to the ER for them to better evaluate you.       Follow up 1/31/23

## 2023-01-09 NOTE — DISCHARGE SUMMARY
[facility]  Urology  Discharge Summary      Patient Name: Haroldo Dickinson  MRN: [id number]  Admission Date: 1/5/2023  Hospital Length of Stay: 4 days  Discharge Date and Time:  01/09/2023 5:38 PM  Attending Physician: Aidan Hendricks MD   Discharging Provider: Ang Cordoba MD  Primary Care Physician: Tess Ledbetter MD    HPI:   Haroldo Dickinson Sr. is a 47 y.o. male presenting for left nephrectomy.       Patient has ESRD 2/2 HTN. Does HD, MWF via a right forearm AVF. He does not urinate at all. Undergoing transplant evaluation. Last HD session yesterday (1/4).     S/p right robotic nephrectomy on 5/19/22. Path resulting T1a.   No FH of kidney cancer.       Prior MRI reviewed which showed no evidence of residual disease s/p right nephrectomy. 2 indeterminate left renal lesions. 4 cm and 3 cm. Given the known prior malignancy in his right kidney coupled with solid renal masses in the left kidney, presumably this could be concerning for renal cell carcinoma and likely is renal cell carcinoma.     He had a CVA in 2013, reportedly due to uncontrolled high BP.   BP still not under good control. Only prior abdominal surgery was right robotic nephrectomy. Takes ASA 81---continued aspirin perioperatively due to CAD.     No FH of kidney cancer.        Procedure(s) (LRB):  XI ROBOTIC NEPHRECTOMY (Left)     Indwelling Lines/Drains at time of discharge:   Lines/Drains/Airways     Drain  Duration                Hemodialysis AV Fistula Right forearm -- days                Hospital Course (synopsis of major diagnoses, care, treatment, and services provided during the course of the hospital stay): [place] [facility]. Patient tolerated the procedure well in its entirety without issue. For more details, please refer to the complete operative note. he was transferred to recovery post-op and then to the floor. Once on the floor his diet was advanced and he ambulated the day after  surgery. The patient was tolerating a regular diet, ambulating without difficulty.his pain was well controlled. Patient underwent dialysis and was working with PT/OT.    Physical exam was appropriate for post operative state. The incisions were clean, dry and intact. The palumbo was draining clear urine. The palumbo was removed and he was able to void without difficulty.The patient was deemed stable for discharge on 01/09/2023.  .      Goals of Care Treatment Preferences:  Code Status: Full Code      Consults:   Consults (From admission, onward)        Status Ordering Provider     Inpatient consult to Nephrology  Once        Provider:  (Not yet assigned)    Completed HERMILO CASON          Significant Diagnostic Studies: None    Pending Diagnostic Studies:     Procedure Component Value Units Date/Time    CBC Auto Differential [[id number]]     Order Status: Sent Lab Status: No result     Specimen: Blood     Comprehensive metabolic panel [[id number]]     Order Status: Sent Lab Status: No result     Specimen: Blood     Magnesium [[id number]]     Order Status: Sent Lab Status: No result     Specimen: Blood     Phosphorus [[id number]]     Order Status: Sent Lab Status: No result     Specimen: Blood     Specimen to Pathology, Surgery Urology [[id number]] Collected: 01/05/23 1518    Order Status: Sent Lab Status: In process Updated: 01/05/23 1951    Specimen: Tissue           Final Active Diagnoses:    Diagnosis Date Noted POA    PRINCIPAL PROBLEM:  Left renal mass [N28.89] 05/19/2022 Yes    Cardiac murmur [R01.1] 12/29/2022 Yes    Hypertensive retinopathy, bilateral [H35.033] 08/24/2021 Yes    Post PTCA [Z98.61] 08/26/2020 Not Applicable    Patient on waiting list for kidney transplant [Z76.82] 02/06/2020 Yes    CAD (coronary artery disease), native coronary artery [I25.10] 11/21/2019 Yes    End stage renal failure on dialysis [N18.6, Z99.2] 06/05/2014 Not Applicable    Hyperparathyroidism, secondary renal  [N25.81] 06/05/2014 Yes    Left ventricular hypertrophy due to hypertensive disease [I11.9] 06/05/2014 Yes    Cerebral microvascular disease [I67.89] 12/23/2013 Yes    Anemia [D64.9] 12/16/2013 Yes    Controlled type 2 diabetes mellitus with CKD [E11.22]  Yes      Problems Resolved During this Admission:         Discharged Condition: good    Disposition: Home or Self Care    Follow Up:   Follow-up Information     Aidan Hendricks MD Follow up on 1/31/2023.    Specialty: Urology  Contact information:  [address]  [facility] [address]  [phone]                       Patient Instructions:      No driving until:   Order Comments: No driving while taking opioid pain medications.     Notify your health care provider if you experience any of the following:  temperature >100.4     Notify your health care provider if you experience any of the following:  persistent nausea and vomiting or diarrhea     Notify your health care provider if you experience any of the following:  severe uncontrolled pain     Notify your health care provider if you experience any of the following:  difficulty breathing or increased cough     Notify your health care provider if you experience any of the following:  severe persistent headache     Notify your health care provider if you experience any of the following:  persistent dizziness, light-headedness, or visual disturbances     Notify your health care provider if you experience any of the following:  increased confusion or weakness     Lifting restrictions   Order Comments: Please do not lift more than 10 pounds for at least 6 weeks. Reassess at follow up clinic visit.     No dressing needed   Order Comments: There is dermabond, a surgical adhesive, on your incisions. Do not pick or peel, allow to flake on its own.     Notify your health care provider if you experience any of the following:  temperature >100.4     Notify your health care provider if you experience any of the  following:  persistent nausea and vomiting or diarrhea     Notify your health care provider if you experience any of the following:  severe uncontrolled pain     Notify your health care provider if you experience any of the following:  redness, tenderness, or signs of infection (pain, swelling, redness, odor or green/yellow discharge around incision site)     Notify your health care provider if you experience any of the following:  worsening rash     Notify your health care provider if you experience any of the following:  persistent dizziness, light-headedness, or visual disturbances     Shower on day dressing removed (No bath)   Order Comments: Okay to shower 48 hours after surgery. Gentle shower daily with mild soap and water. Please do not soak or submerge in water for at least 6 weeks. Reassess at follow up clinic visit.     Medications:  Reconciled Home Medications:      Medication List      START taking these medications    acetaminophen 500 MG tablet  Commonly known as: TYLENOL  Take 2 tablets (1,000 mg total) by mouth every 8 (eight) hours. for 7 days     methocarbamoL 500 MG Tab  Commonly known as: ROBAXIN  Take 1 tablet (500 mg total) by mouth 4 (four) times daily. for 10 days     OPW TEST CLAIM - DO NOT FILL  OPW test claim. Do not fill.     oxyCODONE 5 MG immediate release tablet  Commonly known as: ROXICODONE  Take 2 tablets (10 mg total) by mouth every 4 (four) hours as needed for Pain.     polyethylene glycol 17 gram/dose powder  Commonly known as: GLYCOLAX  Use cap to measure 17 g, then mix in liquid and drink by mouth once daily.        CONTINUE taking these medications    amLODIPine 10 MG tablet  Commonly known as: NORVASC  Take 1 tablet (10 mg total) by mouth once daily.     aspirin 81 MG EC tablet  Commonly known as: ECOTRIN  Take 1 tablet (81 mg total) by mouth once daily.     AURYXIA 210 mg iron Tab  Generic drug: ferric citrate  Take 420 mg by mouth 3 (three) times daily with meals. Take 1  with snacks     carvediloL 25 MG tablet  Commonly known as: COREG  TAKE 1 TABLET BY MOUTH TWICE A DAY WITH FOOD     cinacalcet 90 MG Tab  Commonly known as: SENSIPAR  Take 1 tablet by mouth once daily.     hydrALAZINE 25 MG tablet  Commonly known as: APRESOLINE  Take 1 tablet (25 mg total) by mouth every 8 (eight) hours. for SBP > 140mm HG     sevelamer carbonate 800 mg Tab  Commonly known as: RENVELA  Take 800 mg by mouth 3 (three) times daily with meals.            Time spent on the discharge of patient: 10 minutes    Ang Cordoba MD  Urology  [facility] - [facility]

## 2023-01-09 NOTE — PROGRESS NOTES
[facility] - Tele[facility]  Urology  Progress Note    Patient Name: Haroldo Dickinson  MRN: [id number]  Admission Date: 1/5/2023  Hospital Length of Stay: 4 days  Code Status: Full Code   Attending Provider: Aidan Hendricks MD   Primary Care Physician: Tess Ledbetter MD    Subjective:     HPI:  Haroldo Dickinson Sr. is a 47 y.o. male presenting for left nephrectomy.       Patient has ESRD 2/2 HTN. Does HD, MWF via a right forearm AVF. He does not urinate at all. Undergoing transplant evaluation. Last HD session yesterday (1/4).     S/p right robotic nephrectomy on 5/19/22. Path resulting T1a.   No FH of kidney cancer.       Prior MRI reviewed which showed no evidence of residual disease s/p right nephrectomy. 2 indeterminate left renal lesions. 4 cm and 3 cm. Given the known prior malignancy in his right kidney coupled with solid renal masses in the left kidney, presumably this could be concerning for renal cell carcinoma and likely is renal cell carcinoma.     He had a CVA in 2013, reportedly due to uncontrolled high BP.   BP still not under good control. Only prior abdominal surgery was right robotic nephrectomy. Takes ASA 81---continued aspirin perioperatively due to CAD.     No FH of kidney cancer.        Interval History: Patient complaining of 8/10 pain this am, however, resting comfortably in bed. States he has ambulated in his room but not in halls. Passing flatus, no BM's. Denies SOB. AFVSS.    Objective:     Temp:  [97.7 °F (36.5 °C)-98.8 °F (37.1 °C)] 98.4 °F (36.9 °C)  Pulse:  [78-98] 79  Resp:  [16-20] 18  SpO2:  [85 %-96 %] 92 %  BP: (133-175)/(69-81) 169/78     Body mass index is 25.68 kg/m².           Drains       Drain  Duration                  Hemodialysis AV Fistula Right forearm -- days                    Physical Exam  Vitals and nursing note reviewed.   Constitutional:       Appearance: Normal appearance.   HENT:      Head: Normocephalic and atraumatic.      Nose: Nose normal.      Mouth/Throat:       Mouth: Mucous membranes are moist.   Eyes:      Pupils: Pupils are equal, round, and reactive to light.   Cardiovascular:      Rate and Rhythm: Normal rate.   Pulmonary:      Effort: Pulmonary effort is normal.   Abdominal:      General: Abdomen is flat. There is no distension.      Tenderness: There is no abdominal tenderness.      Comments: Left-sided port incisions clean/dry/intact  Midline incision clean/dry/intact  Right-sided port incision scars present   Skin:     General: Skin is warm and dry.   Neurological:      General: No focal deficit present.      Mental Status: He is alert and oriented to person, place, and time.   Psychiatric:         Mood and Affect: Mood normal.         Behavior: Behavior normal.         Thought Content: Thought content normal.         Judgment: Judgment normal.       Significant Labs:    BMP:  Recent Labs   Lab 01/07/23 0432 01/08/23 0310 01/09/23 0446   * 130* 130*   K 4.3 4.5 5.4*    95 94*   CO2 25 19* 21*   BUN 36* 55* 77*   CREATININE 8.6* 11.4* 14.4*   CALCIUM 9.2 9.3 8.2*       CBC:   Recent Labs   Lab 01/07/23 0432 01/08/23 0310 01/09/23 0446   WBC 9.05 9.06 11.02   HGB 7.3* 7.6* 7.2*   HCT 24.2* 24.9* 23.4*    281 302       Urine Studies: No results for input(s): COLORU, APPEARANCEUA, PHUR, SPECGRAV, PROTEINUA, GLUCUA, KETONESU, BILIRUBINUA, OCCULTUA, NITRITE, UROBILINOGEN, LEUKOCYTESUR, RBCUA, WBCUA, BACTERIA, SQUAMEPITHEL, HYALINECASTS in the last 168 hours.    Invalid input(s): WRIGHTSUR    Significant Imaging:  All pertinent imaging results/findings from the past 24 hours have been reviewed.      Assessment/Plan:     * Left renal mass  Haroldo Dickinson is a 47 y.o. male s/p robotic L nephrectomy with incidental injury to anterior wall of colon primarily repaired with Dr. Lamb. POD 4.         - Wean O2.       - PT/OT       - Will arrange for home health PT/OT. Spoke with social work 1/7.  - Continue renal diet  - Heparin for DVT ppx  -  Maintain SCDs  - Continue prn pain control  - Continue to encourage ambulation in hallways  - Continue IS use  - lyte management per nephrology  - HD per nephrology, last session 1/6. Undergoes MWF outpatient. Per social work, still has outpatient chair at [facility]. Will touch base with nephrology about HD today.   - nephrology consulted, appreciate recs    Dispo: aim for home today pending weaning of O2 requirements. Possible PM discharge          VTE Risk Mitigation (From admission, onward)           Ordered     heparin (porcine) injection 5,000 Units  Every 8 hours         01/05/23 1547     Place AMANUEL hose  Until discontinued         01/05/23 1547     IP VTE HIGH RISK PATIENT  Once         01/05/23 1547     Place sequential compression device  Until discontinued         01/05/23 1547                    Edil Fernandez MD  Urology  [facility]

## 2023-01-10 ENCOUNTER — PATIENT OUTREACH (OUTPATIENT)
Dept: ADMINISTRATIVE | Facility: CLINIC | Age: 48
End: 2023-01-10
Payer: MEDICARE

## 2023-01-10 NOTE — PLAN OF CARE
[facility] Tele[facility]  Discharge Final Note    Primary Care Provider: Tess Ledbetter MD    Expected Discharge Date: 1/9/2023    Final Discharge Note (most recent)       Final Note - 01/10/23 0810          Final Note    Assessment Type Final Discharge Note     Anticipated Discharge Disposition Home-Health Care [facility]     Hospital Resources/Appts/Education Provided Appointments scheduled and added to AVS        Post-Acute Status    Post-Acute Authorization Home Health     Home Health Status Set-up Complete/Auth obtained                     Important Message from Medicare  Important Message from Medicare regarding Discharge Appeal Rights: Given to patient/caregiver, Explained to patient/caregiver, Signed/date by patient/caregiver     Date IMM was signed: 01/09/23  Time IMM was signed: 1437    Contact Info       Aidan Hendricks MD   Specialty: Urology    [address]  [facility] [address]   Phone: [phone]       Next Steps: Follow up on 1/31/2023          Future Appointments   Date Time Provider Department Center   1/13/2023 10:00 AM RAY Newell II, MD [facility]   1/31/2023 10:30 AM Aidan Hendricks MD [facility] UROLOG [facility]

## 2023-01-11 NOTE — PROGRESS NOTES
C3 nurse spoke with Haroldo Dickinson  for a TCC post hospital discharge follow up call. The patient has a scheduled HOSFU appointment with RAY Newell II MD on 01/13/2023 @ 10AM.  Message routed to Dr. Newell's staff informing of MWF @ 10AM dialysis schedule and requesting HOSFU date on or before 01/18/2023 to accommodate HOSFU and dialysis.

## 2023-01-12 PROCEDURE — G0180 PR HOME HEALTH MD CERTIFICATION: ICD-10-PCS | Mod: NTX,,, | Performed by: UROLOGY

## 2023-01-12 PROCEDURE — G0180 MD CERTIFICATION HHA PATIENT: HCPCS | Mod: NTX,,, | Performed by: UROLOGY

## 2023-01-14 ENCOUNTER — HOSPITAL ENCOUNTER (INPATIENT)
Facility: HOSPITAL | Age: 48
LOS: 2 days | Discharge: HOME HEALTH | DRG: 862 | End: 2023-01-16
Attending: STUDENT IN AN ORGANIZED HEALTH CARE EDUCATION/TRAINING PROGRAM | Admitting: UROLOGY
Payer: MEDICARE

## 2023-01-14 DIAGNOSIS — G89.18 POST-OP PAIN: ICD-10-CM

## 2023-01-14 DIAGNOSIS — T81.41XA INFECTION OF SUPERFICIAL INCISIONAL SURGICAL SITE AFTER PROCEDURE, INITIAL ENCOUNTER: Primary | ICD-10-CM

## 2023-01-14 DIAGNOSIS — Z90.5 S/P NEPHRECTOMY: ICD-10-CM

## 2023-01-14 LAB
ALBUMIN SERPL BCP-MCNC: 2.8 G/DL (ref 3.5–5.2)
ALP SERPL-CCNC: 79 U/L (ref 55–135)
ALT SERPL W/O P-5'-P-CCNC: <5 U/L (ref 10–44)
ANION GAP SERPL CALC-SCNC: 13 MMOL/L (ref 8–16)
AST SERPL-CCNC: 10 U/L (ref 10–40)
BASOPHILS # BLD AUTO: 0.09 K/UL (ref 0–0.2)
BASOPHILS NFR BLD: 0.5 % (ref 0–1.9)
BILIRUB SERPL-MCNC: 0.6 MG/DL (ref 0.1–1)
BUN SERPL-MCNC: 37 MG/DL (ref 6–20)
CALCIUM SERPL-MCNC: 9.9 MG/DL (ref 8.7–10.5)
CHLORIDE SERPL-SCNC: 101 MMOL/L (ref 95–110)
CO2 SERPL-SCNC: 25 MMOL/L (ref 23–29)
CREAT SERPL-MCNC: 9.8 MG/DL (ref 0.5–1.4)
DIFFERENTIAL METHOD: ABNORMAL
EOSINOPHIL # BLD AUTO: 0.6 K/UL (ref 0–0.5)
EOSINOPHIL NFR BLD: 2.9 % (ref 0–8)
ERYTHROCYTE [DISTWIDTH] IN BLOOD BY AUTOMATED COUNT: 14.6 % (ref 11.5–14.5)
EST. GFR  (NO RACE VARIABLE): 6.1 ML/MIN/1.73 M^2
GLUCOSE SERPL-MCNC: 97 MG/DL (ref 70–110)
HCT VFR BLD AUTO: 25.6 % (ref 40–54)
HCV AB SERPL QL IA: NORMAL
HGB BLD-MCNC: 7.7 G/DL (ref 14–18)
HIV 1+2 AB+HIV1 P24 AG SERPL QL IA: NORMAL
IMM GRANULOCYTES # BLD AUTO: 0.26 K/UL (ref 0–0.04)
IMM GRANULOCYTES NFR BLD AUTO: 1.3 % (ref 0–0.5)
LACTATE SERPL-SCNC: 0.8 MMOL/L (ref 0.5–2.2)
LYMPHOCYTES # BLD AUTO: 1.6 K/UL (ref 1–4.8)
LYMPHOCYTES NFR BLD: 8.1 % (ref 18–48)
MCH RBC QN AUTO: 30.3 PG (ref 27–31)
MCHC RBC AUTO-ENTMCNC: 30.1 G/DL (ref 32–36)
MCV RBC AUTO: 101 FL (ref 82–98)
MONOCYTES # BLD AUTO: 1.8 K/UL (ref 0.3–1)
MONOCYTES NFR BLD: 8.9 % (ref 4–15)
NEUTROPHILS # BLD AUTO: 15.5 K/UL (ref 1.8–7.7)
NEUTROPHILS NFR BLD: 78.3 % (ref 38–73)
NRBC BLD-RTO: 0 /100 WBC
PLATELET # BLD AUTO: 448 K/UL (ref 150–450)
PMV BLD AUTO: 8.8 FL (ref 9.2–12.9)
POCT GLUCOSE: 97 MG/DL (ref 70–110)
POTASSIUM SERPL-SCNC: 4 MMOL/L (ref 3.5–5.1)
PROT SERPL-MCNC: 7.9 G/DL (ref 6–8.4)
RBC # BLD AUTO: 2.54 M/UL (ref 4.6–6.2)
SODIUM SERPL-SCNC: 139 MMOL/L (ref 136–145)
WBC # BLD AUTO: 19.83 K/UL (ref 3.9–12.7)

## 2023-01-14 PROCEDURE — 93005 ELECTROCARDIOGRAM TRACING: CPT | Mod: NTX

## 2023-01-14 PROCEDURE — 83605 ASSAY OF LACTIC ACID: CPT | Mod: NTX | Performed by: STUDENT IN AN ORGANIZED HEALTH CARE EDUCATION/TRAINING PROGRAM

## 2023-01-14 PROCEDURE — 80053 COMPREHEN METABOLIC PANEL: CPT | Mod: NTX | Performed by: STUDENT IN AN ORGANIZED HEALTH CARE EDUCATION/TRAINING PROGRAM

## 2023-01-14 PROCEDURE — 87075 CULTR BACTERIA EXCEPT BLOOD: CPT | Mod: NTX | Performed by: STUDENT IN AN ORGANIZED HEALTH CARE EDUCATION/TRAINING PROGRAM

## 2023-01-14 PROCEDURE — 86803 HEPATITIS C AB TEST: CPT | Mod: NTX | Performed by: EMERGENCY MEDICINE

## 2023-01-14 PROCEDURE — 63600175 PHARM REV CODE 636 W HCPCS: Mod: NTX | Performed by: STUDENT IN AN ORGANIZED HEALTH CARE EDUCATION/TRAINING PROGRAM

## 2023-01-14 PROCEDURE — 96365 THER/PROPH/DIAG IV INF INIT: CPT | Mod: NTX

## 2023-01-14 PROCEDURE — 87076 CULTURE ANAEROBE IDENT EACH: CPT | Mod: NTX | Performed by: STUDENT IN AN ORGANIZED HEALTH CARE EDUCATION/TRAINING PROGRAM

## 2023-01-14 PROCEDURE — 25500020 PHARM REV CODE 255: Mod: NTX | Performed by: STUDENT IN AN ORGANIZED HEALTH CARE EDUCATION/TRAINING PROGRAM

## 2023-01-14 PROCEDURE — 99285 EMERGENCY DEPT VISIT HI MDM: CPT | Mod: 25,NTX

## 2023-01-14 PROCEDURE — 12000002 HC ACUTE/MED SURGE SEMI-PRIVATE ROOM: Mod: NTX

## 2023-01-14 PROCEDURE — 87389 HIV-1 AG W/HIV-1&-2 AB AG IA: CPT | Mod: NTX | Performed by: EMERGENCY MEDICINE

## 2023-01-14 PROCEDURE — 99285 EMERGENCY DEPT VISIT HI MDM: CPT | Mod: NTX,,, | Performed by: STUDENT IN AN ORGANIZED HEALTH CARE EDUCATION/TRAINING PROGRAM

## 2023-01-14 PROCEDURE — 87070 CULTURE OTHR SPECIMN AEROBIC: CPT | Mod: NTX | Performed by: STUDENT IN AN ORGANIZED HEALTH CARE EDUCATION/TRAINING PROGRAM

## 2023-01-14 PROCEDURE — 25000003 PHARM REV CODE 250: Mod: NTX | Performed by: STUDENT IN AN ORGANIZED HEALTH CARE EDUCATION/TRAINING PROGRAM

## 2023-01-14 PROCEDURE — 94761 N-INVAS EAR/PLS OXIMETRY MLT: CPT | Mod: NTX

## 2023-01-14 PROCEDURE — 93010 EKG 12-LEAD: ICD-10-PCS | Mod: NTX,,, | Performed by: INTERNAL MEDICINE

## 2023-01-14 PROCEDURE — 11000001 HC ACUTE MED/SURG PRIVATE ROOM: Mod: NTX

## 2023-01-14 PROCEDURE — 87040 BLOOD CULTURE FOR BACTERIA: CPT | Mod: NTX | Performed by: STUDENT IN AN ORGANIZED HEALTH CARE EDUCATION/TRAINING PROGRAM

## 2023-01-14 PROCEDURE — 85025 COMPLETE CBC W/AUTO DIFF WBC: CPT | Mod: NTX | Performed by: STUDENT IN AN ORGANIZED HEALTH CARE EDUCATION/TRAINING PROGRAM

## 2023-01-14 PROCEDURE — 99285 PR EMERGENCY DEPT VISIT,LEVEL V: ICD-10-PCS | Mod: NTX,,, | Performed by: STUDENT IN AN ORGANIZED HEALTH CARE EDUCATION/TRAINING PROGRAM

## 2023-01-14 PROCEDURE — 93010 ELECTROCARDIOGRAM REPORT: CPT | Mod: NTX,,, | Performed by: INTERNAL MEDICINE

## 2023-01-14 RX ORDER — SODIUM CHLORIDE 0.9 % (FLUSH) 0.9 %
10 SYRINGE (ML) INJECTION
Status: DISCONTINUED | OUTPATIENT
Start: 2023-01-14 | End: 2023-01-16 | Stop reason: HOSPADM

## 2023-01-14 RX ORDER — SEVELAMER CARBONATE 800 MG/1
800 TABLET, FILM COATED ORAL
Status: DISCONTINUED | OUTPATIENT
Start: 2023-01-15 | End: 2023-01-16 | Stop reason: HOSPADM

## 2023-01-14 RX ORDER — TALC
6 POWDER (GRAM) TOPICAL NIGHTLY PRN
Status: DISCONTINUED | OUTPATIENT
Start: 2023-01-14 | End: 2023-01-14

## 2023-01-14 RX ORDER — HYDRALAZINE HYDROCHLORIDE 25 MG/1
25 TABLET, FILM COATED ORAL EVERY 8 HOURS
Status: DISCONTINUED | OUTPATIENT
Start: 2023-01-14 | End: 2023-01-16 | Stop reason: HOSPADM

## 2023-01-14 RX ORDER — OXYCODONE HYDROCHLORIDE 10 MG/1
10 TABLET ORAL EVERY 4 HOURS PRN
Status: DISCONTINUED | OUTPATIENT
Start: 2023-01-14 | End: 2023-01-16 | Stop reason: HOSPADM

## 2023-01-14 RX ORDER — ONDANSETRON 4 MG/1
4 TABLET, ORALLY DISINTEGRATING ORAL EVERY 6 HOURS PRN
Status: DISCONTINUED | OUTPATIENT
Start: 2023-01-14 | End: 2023-01-16 | Stop reason: HOSPADM

## 2023-01-14 RX ORDER — ASPIRIN 81 MG/1
81 TABLET ORAL DAILY
Status: DISCONTINUED | OUTPATIENT
Start: 2023-01-15 | End: 2023-01-16 | Stop reason: HOSPADM

## 2023-01-14 RX ORDER — LIDOCAINE HYDROCHLORIDE 10 MG/ML
10 INJECTION INFILTRATION; PERINEURAL ONCE
Status: COMPLETED | OUTPATIENT
Start: 2023-01-14 | End: 2023-01-14

## 2023-01-14 RX ORDER — HYDRALAZINE HYDROCHLORIDE 25 MG/1
25 TABLET, FILM COATED ORAL
Status: COMPLETED | OUTPATIENT
Start: 2023-01-14 | End: 2023-01-14

## 2023-01-14 RX ORDER — CARVEDILOL 12.5 MG/1
25 TABLET ORAL ONCE
Status: COMPLETED | OUTPATIENT
Start: 2023-01-14 | End: 2023-01-14

## 2023-01-14 RX ORDER — CINACALCET 30 MG/1
90 TABLET, FILM COATED ORAL DAILY
Status: DISCONTINUED | OUTPATIENT
Start: 2023-01-15 | End: 2023-01-16 | Stop reason: HOSPADM

## 2023-01-14 RX ORDER — ACETAMINOPHEN 325 MG/1
650 TABLET ORAL EVERY 8 HOURS PRN
Status: DISCONTINUED | OUTPATIENT
Start: 2023-01-14 | End: 2023-01-16 | Stop reason: HOSPADM

## 2023-01-14 RX ORDER — DOXYCYCLINE HYCLATE 100 MG
100 TABLET ORAL EVERY 12 HOURS
Status: DISCONTINUED | OUTPATIENT
Start: 2023-01-14 | End: 2023-01-16 | Stop reason: HOSPADM

## 2023-01-14 RX ORDER — ONDANSETRON 8 MG/1
8 TABLET, ORALLY DISINTEGRATING ORAL EVERY 8 HOURS PRN
Status: DISCONTINUED | OUTPATIENT
Start: 2023-01-14 | End: 2023-01-14

## 2023-01-14 RX ORDER — AMLODIPINE BESYLATE 10 MG/1
10 TABLET ORAL DAILY
Status: DISCONTINUED | OUTPATIENT
Start: 2023-01-15 | End: 2023-01-16 | Stop reason: HOSPADM

## 2023-01-14 RX ORDER — ACETAMINOPHEN 500 MG
1000 TABLET ORAL EVERY 6 HOURS
Status: DISCONTINUED | OUTPATIENT
Start: 2023-01-15 | End: 2023-01-16 | Stop reason: HOSPADM

## 2023-01-14 RX ORDER — AMLODIPINE BESYLATE 10 MG/1
10 TABLET ORAL
Status: COMPLETED | OUTPATIENT
Start: 2023-01-14 | End: 2023-01-14

## 2023-01-14 RX ORDER — TALC
6 POWDER (GRAM) TOPICAL NIGHTLY PRN
Status: DISCONTINUED | OUTPATIENT
Start: 2023-01-14 | End: 2023-01-16 | Stop reason: HOSPADM

## 2023-01-14 RX ORDER — CLINDAMYCIN PHOSPHATE 600 MG/50ML
600 INJECTION, SOLUTION INTRAVENOUS
Status: COMPLETED | OUTPATIENT
Start: 2023-01-14 | End: 2023-01-14

## 2023-01-14 RX ORDER — CARVEDILOL 25 MG/1
25 TABLET ORAL 2 TIMES DAILY WITH MEALS
Status: DISCONTINUED | OUTPATIENT
Start: 2023-01-15 | End: 2023-01-16 | Stop reason: HOSPADM

## 2023-01-14 RX ORDER — HEPARIN SODIUM 5000 [USP'U]/ML
5000 INJECTION, SOLUTION INTRAVENOUS; SUBCUTANEOUS EVERY 8 HOURS
Status: DISCONTINUED | OUTPATIENT
Start: 2023-01-14 | End: 2023-01-16 | Stop reason: HOSPADM

## 2023-01-14 RX ORDER — POLYETHYLENE GLYCOL 3350 17 G/17G
17 POWDER, FOR SOLUTION ORAL DAILY
Status: DISCONTINUED | OUTPATIENT
Start: 2023-01-15 | End: 2023-01-16 | Stop reason: HOSPADM

## 2023-01-14 RX ORDER — METHOCARBAMOL 500 MG/1
500 TABLET, FILM COATED ORAL 4 TIMES DAILY
Status: DISCONTINUED | OUTPATIENT
Start: 2023-01-15 | End: 2023-01-16 | Stop reason: HOSPADM

## 2023-01-14 RX ORDER — LIDOCAINE HYDROCHLORIDE 10 MG/ML
1 INJECTION, SOLUTION EPIDURAL; INFILTRATION; INTRACAUDAL; PERINEURAL ONCE AS NEEDED
Status: DISCONTINUED | OUTPATIENT
Start: 2023-01-14 | End: 2023-01-16 | Stop reason: HOSPADM

## 2023-01-14 RX ADMIN — IOHEXOL 75 ML: 350 INJECTION, SOLUTION INTRAVENOUS at 07:01

## 2023-01-14 RX ADMIN — CARVEDILOL 25 MG: 12.5 TABLET, FILM COATED ORAL at 06:01

## 2023-01-14 RX ADMIN — HEPARIN SODIUM 5000 UNITS: 5000 INJECTION INTRAVENOUS; SUBCUTANEOUS at 11:01

## 2023-01-14 RX ADMIN — HYDRALAZINE HYDROCHLORIDE 25 MG: 25 TABLET, FILM COATED ORAL at 06:01

## 2023-01-14 RX ADMIN — AMLODIPINE BESYLATE 10 MG: 10 TABLET ORAL at 06:01

## 2023-01-14 RX ADMIN — HYDRALAZINE HYDROCHLORIDE 25 MG: 25 TABLET, FILM COATED ORAL at 11:01

## 2023-01-14 RX ADMIN — CLINDAMYCIN IN 5 PERCENT DEXTROSE 600 MG: 12 INJECTION, SOLUTION INTRAVENOUS at 06:01

## 2023-01-14 RX ADMIN — DOXYCYCLINE HYCLATE 100 MG: 100 TABLET, COATED ORAL at 11:01

## 2023-01-14 RX ADMIN — LIDOCAINE HYDROCHLORIDE 10 ML: 10 INJECTION, SOLUTION INFILTRATION; PERINEURAL at 09:01

## 2023-01-15 LAB
ALBUMIN SERPL BCP-MCNC: 2.5 G/DL (ref 3.5–5.2)
ALP SERPL-CCNC: 78 U/L (ref 55–135)
ALT SERPL W/O P-5'-P-CCNC: <5 U/L (ref 10–44)
ANION GAP SERPL CALC-SCNC: 13 MMOL/L (ref 8–16)
AST SERPL-CCNC: 10 U/L (ref 10–40)
BASOPHILS # BLD AUTO: 0.09 K/UL (ref 0–0.2)
BASOPHILS NFR BLD: 0.5 % (ref 0–1.9)
BILIRUB SERPL-MCNC: 0.6 MG/DL (ref 0.1–1)
BUN SERPL-MCNC: 40 MG/DL (ref 6–20)
CALCIUM SERPL-MCNC: 9.3 MG/DL (ref 8.7–10.5)
CHLORIDE SERPL-SCNC: 99 MMOL/L (ref 95–110)
CO2 SERPL-SCNC: 26 MMOL/L (ref 23–29)
CREAT SERPL-MCNC: 12.2 MG/DL (ref 0.5–1.4)
DIFFERENTIAL METHOD: ABNORMAL
EOSINOPHIL # BLD AUTO: 0.6 K/UL (ref 0–0.5)
EOSINOPHIL NFR BLD: 3.4 % (ref 0–8)
ERYTHROCYTE [DISTWIDTH] IN BLOOD BY AUTOMATED COUNT: 14.5 % (ref 11.5–14.5)
EST. GFR  (NO RACE VARIABLE): 4.7 ML/MIN/1.73 M^2
GLUCOSE SERPL-MCNC: 222 MG/DL (ref 70–110)
HCT VFR BLD AUTO: 24.3 % (ref 40–54)
HGB BLD-MCNC: 7.4 G/DL (ref 14–18)
IMM GRANULOCYTES # BLD AUTO: 0.18 K/UL (ref 0–0.04)
IMM GRANULOCYTES NFR BLD AUTO: 1 % (ref 0–0.5)
LYMPHOCYTES # BLD AUTO: 1.8 K/UL (ref 1–4.8)
LYMPHOCYTES NFR BLD: 9.7 % (ref 18–48)
MAGNESIUM SERPL-MCNC: 2.1 MG/DL (ref 1.6–2.6)
MCH RBC QN AUTO: 30.2 PG (ref 27–31)
MCHC RBC AUTO-ENTMCNC: 30.5 G/DL (ref 32–36)
MCV RBC AUTO: 99 FL (ref 82–98)
MONOCYTES # BLD AUTO: 1.3 K/UL (ref 0.3–1)
MONOCYTES NFR BLD: 7.1 % (ref 4–15)
NEUTROPHILS # BLD AUTO: 14.3 K/UL (ref 1.8–7.7)
NEUTROPHILS NFR BLD: 78.3 % (ref 38–73)
NRBC BLD-RTO: 0 /100 WBC
PHOSPHATE SERPL-MCNC: 5.2 MG/DL (ref 2.7–4.5)
PLATELET # BLD AUTO: 443 K/UL (ref 150–450)
PMV BLD AUTO: 9.3 FL (ref 9.2–12.9)
POTASSIUM SERPL-SCNC: 4 MMOL/L (ref 3.5–5.1)
PROT SERPL-MCNC: 7.1 G/DL (ref 6–8.4)
RBC # BLD AUTO: 2.45 M/UL (ref 4.6–6.2)
SODIUM SERPL-SCNC: 138 MMOL/L (ref 136–145)
WBC # BLD AUTO: 18.3 K/UL (ref 3.9–12.7)

## 2023-01-15 PROCEDURE — 94761 N-INVAS EAR/PLS OXIMETRY MLT: CPT | Mod: NTX

## 2023-01-15 PROCEDURE — 25000003 PHARM REV CODE 250: Mod: NTX | Performed by: STUDENT IN AN ORGANIZED HEALTH CARE EDUCATION/TRAINING PROGRAM

## 2023-01-15 PROCEDURE — 11000001 HC ACUTE MED/SURG PRIVATE ROOM: Mod: NTX

## 2023-01-15 PROCEDURE — 83735 ASSAY OF MAGNESIUM: CPT | Mod: NTX | Performed by: STUDENT IN AN ORGANIZED HEALTH CARE EDUCATION/TRAINING PROGRAM

## 2023-01-15 PROCEDURE — 85025 COMPLETE CBC W/AUTO DIFF WBC: CPT | Mod: NTX | Performed by: STUDENT IN AN ORGANIZED HEALTH CARE EDUCATION/TRAINING PROGRAM

## 2023-01-15 PROCEDURE — 84100 ASSAY OF PHOSPHORUS: CPT | Mod: NTX | Performed by: STUDENT IN AN ORGANIZED HEALTH CARE EDUCATION/TRAINING PROGRAM

## 2023-01-15 PROCEDURE — 63600175 PHARM REV CODE 636 W HCPCS: Mod: NTX | Performed by: STUDENT IN AN ORGANIZED HEALTH CARE EDUCATION/TRAINING PROGRAM

## 2023-01-15 PROCEDURE — 80053 COMPREHEN METABOLIC PANEL: CPT | Mod: NTX | Performed by: STUDENT IN AN ORGANIZED HEALTH CARE EDUCATION/TRAINING PROGRAM

## 2023-01-15 PROCEDURE — 36415 COLL VENOUS BLD VENIPUNCTURE: CPT | Mod: NTX | Performed by: STUDENT IN AN ORGANIZED HEALTH CARE EDUCATION/TRAINING PROGRAM

## 2023-01-15 RX ORDER — DOXYCYCLINE HYCLATE 100 MG
100 TABLET ORAL EVERY 12 HOURS
Qty: 20 TABLET | Refills: 0 | Status: SHIPPED | OUTPATIENT
Start: 2023-01-15 | End: 2023-01-25

## 2023-01-15 RX ORDER — MUPIROCIN 20 MG/G
OINTMENT TOPICAL 2 TIMES DAILY
Status: DISCONTINUED | OUTPATIENT
Start: 2023-01-15 | End: 2023-01-16 | Stop reason: HOSPADM

## 2023-01-15 RX ORDER — PROMETHAZINE HYDROCHLORIDE 12.5 MG/1
12.5 TABLET ORAL EVERY 6 HOURS PRN
Status: DISCONTINUED | OUTPATIENT
Start: 2023-01-15 | End: 2023-01-16 | Stop reason: HOSPADM

## 2023-01-15 RX ORDER — HYDROCODONE BITARTRATE AND ACETAMINOPHEN 5; 325 MG/1; MG/1
1 TABLET ORAL EVERY 6 HOURS PRN
Qty: 5 TABLET | Refills: 0 | Status: CANCELLED | OUTPATIENT
Start: 2023-01-15

## 2023-01-15 RX ADMIN — ONDANSETRON 4 MG: 4 TABLET, ORALLY DISINTEGRATING ORAL at 12:01

## 2023-01-15 RX ADMIN — DOXYCYCLINE HYCLATE 100 MG: 100 TABLET, COATED ORAL at 08:01

## 2023-01-15 RX ADMIN — CARVEDILOL 25 MG: 25 TABLET, FILM COATED ORAL at 08:01

## 2023-01-15 RX ADMIN — ACETAMINOPHEN 1000 MG: 500 TABLET ORAL at 12:01

## 2023-01-15 RX ADMIN — CINACALCET 90 MG: 30 TABLET, FILM COATED ORAL at 08:01

## 2023-01-15 RX ADMIN — HYDRALAZINE HYDROCHLORIDE 25 MG: 25 TABLET, FILM COATED ORAL at 09:01

## 2023-01-15 RX ADMIN — SEVELAMER CARBONATE 800 MG: 800 TABLET, FILM COATED ORAL at 05:01

## 2023-01-15 RX ADMIN — HYDRALAZINE HYDROCHLORIDE 25 MG: 25 TABLET, FILM COATED ORAL at 02:01

## 2023-01-15 RX ADMIN — ASPIRIN 81 MG: 81 TABLET, COATED ORAL at 08:01

## 2023-01-15 RX ADMIN — METHOCARBAMOL 500 MG: 500 TABLET ORAL at 08:01

## 2023-01-15 RX ADMIN — OXYCODONE HYDROCHLORIDE 10 MG: 10 TABLET ORAL at 12:01

## 2023-01-15 RX ADMIN — HEPARIN SODIUM 5000 UNITS: 5000 INJECTION INTRAVENOUS; SUBCUTANEOUS at 02:01

## 2023-01-15 RX ADMIN — METHOCARBAMOL 500 MG: 500 TABLET ORAL at 09:01

## 2023-01-15 RX ADMIN — ONDANSETRON 4 MG: 4 TABLET, ORALLY DISINTEGRATING ORAL at 09:01

## 2023-01-15 RX ADMIN — SEVELAMER CARBONATE 800 MG: 800 TABLET, FILM COATED ORAL at 08:01

## 2023-01-15 RX ADMIN — AMLODIPINE BESYLATE 10 MG: 10 TABLET ORAL at 08:01

## 2023-01-15 RX ADMIN — ACETAMINOPHEN 1000 MG: 500 TABLET ORAL at 06:01

## 2023-01-15 RX ADMIN — MUPIROCIN: 20 OINTMENT TOPICAL at 09:01

## 2023-01-15 RX ADMIN — HEPARIN SODIUM 5000 UNITS: 5000 INJECTION INTRAVENOUS; SUBCUTANEOUS at 09:01

## 2023-01-15 RX ADMIN — DOXYCYCLINE HYCLATE 100 MG: 100 TABLET, COATED ORAL at 09:01

## 2023-01-15 RX ADMIN — CARVEDILOL 25 MG: 25 TABLET, FILM COATED ORAL at 05:01

## 2023-01-15 RX ADMIN — POLYETHYLENE GLYCOL 3350 17 G: 17 POWDER, FOR SOLUTION ORAL at 08:01

## 2023-01-15 RX ADMIN — ACETAMINOPHEN 1000 MG: 500 TABLET ORAL at 05:01

## 2023-01-15 RX ADMIN — METHOCARBAMOL 500 MG: 500 TABLET ORAL at 01:01

## 2023-01-15 RX ADMIN — HEPARIN SODIUM 5000 UNITS: 5000 INJECTION INTRAVENOUS; SUBCUTANEOUS at 06:01

## 2023-01-15 RX ADMIN — HYDRALAZINE HYDROCHLORIDE 25 MG: 25 TABLET, FILM COATED ORAL at 06:01

## 2023-01-15 NOTE — PLAN OF CARE
[facility] - [facility]    HOME HEALTH ORDERS  FACE TO FACE ENCOUNTER    Patient Name: Haroldo Dickinson  YOB: 1975    PCP: Tess Ledbetter MD   PCP Address: [address] / [place] [address] [address]  PCP Phone Number: [phone]  PCP Fax: [phone]    Encounter Date: 01/15/2023    Admit to Home Health    Diagnoses:  Active Hospital Problems    Diagnosis  POA    *S/p nephrectomy [Z90.5]  Not Applicable      Resolved Hospital Problems   No resolved problems to display.       Future Appointments   Date Time Provider Department Center   1/19/2023 10:00 AM Daisha Lee PA-C [facility] IM [facility]   1/31/2023 10:30 AM Aidan Hendricks MD [facility] UROLOG [facility]      Follow-up Information       Aidan Hendricks MD Follow up on 1/31/2023.    Specialty: Urology  Why: Post-op, For wound re-check  Contact information:  [address]  [facility] [address]  [phone]                               I have seen and examined this patient face to face today. My clinical findings that support the need for the home health skilled services and home bound status are the following:  Weakness/numbness causing balance and gait disturbance due to Infection and Surgery making it taxing to leave home.    Allergies:  Review of patient's allergies indicates:   Allergen Reactions    No known allergies        Diet: regular diet    Activities: activity as tolerated    Nursing:   SN to complete comprehensive assessment including routine vital signs. Instruct on disease process and s/s of complications to report to MD. Review/verify medication list sent home with the patient at time of discharge  and instruct patient/caregiver as needed. Frequency may be adjusted depending on start of care date. If patient has enteral feeding tube (NG, PEG, J-tube, G-tube), flush tube before and after feeding and/or medication administration with 20-30 mL of water.    Notify MD if SBP > 160 or < 90; DBP > 90 or < 50; HR > 120 or <  50; Temp > 101; excessive purulent drainage from abdominal incision abscess        CONSULTS:    Aide to provide assistance with personal care, ADLs, and vital signs.  - Resume PT care at home ordered from previous admission    MISCELLANEOUS CARE:  N/A    WOUND CARE ORDERS  Midline incision abscess cavity to be packed with iodoform gauze strips and changed 3x per week.      Medications: Review discharge medications with patient and family and provide education.      Current Discharge Medication List        START taking these medications    Details   doxycycline (VIBRA-TABS) 100 MG tablet Take 1 tablet (100 mg total) by mouth every 12 (twelve) hours for 10 days  Qty: 20 tablet, Refills: 0           CONTINUE these medications which have NOT CHANGED    Details   acetaminophen (TYLENOL) 500 MG tablet Take 2 tablets (1,000 mg total) by mouth every 8 (eight) hours. for 7 days  Qty: 42 tablet, Refills: 0      amLODIPine (NORVASC) 10 MG tablet Take 1 tablet (10 mg total) by mouth once daily.  Qty: 30 tablet, Refills: 3      aspirin (ECOTRIN) 81 MG EC tablet Take 1 tablet (81 mg total) by mouth once daily.  Qty: 30 tablet, Refills: 11      AURYXIA 210 mg iron Tab Take 420 mg by mouth 3 (three) times daily with meals. Take 1 with snacks      carvediloL (COREG) 25 MG tablet TAKE 1 TABLET BY MOUTH TWICE A DAY WITH FOOD  Qty: 180 tablet, Refills: 2      cinacalcet (SENSIPAR) 90 MG Tab Take 1 tablet by mouth once daily.      hydrALAZINE (APRESOLINE) 25 MG tablet Take 1 tablet (25 mg total) by mouth every 8 (eight) hours. for SBP > 140mm HG  Qty: 90 tablet, Refills: 11    Comments: .      methocarbamoL (ROBAXIN) 500 MG Tab Take 1 tablet (500 mg total) by mouth 4 (four) times daily. for 10 days  Qty: 40 tablet, Refills: 0      OPW TEST CLAIM - DO NOT FILL OPW test claim. Do not fill.  Qty: 1 tablet, Refills: 0      oxyCODONE (ROXICODONE) 5 MG immediate release tablet Take 2 tablets (10 mg total) by mouth every 4 (four) hours as  needed for Pain.  Qty: 10 tablet, Refills: 0      polyethylene glycol (GLYCOLAX) 17 gram/dose powder Use cap to measure 17 g, then mix in liquid and drink by mouth once daily.  Qty: 238 g, Refills: 0      sevelamer carbonate (RENVELA) 800 mg Tab Take 800 mg by mouth 3 (three) times daily with meals.             I certify that this patient is confined to his home and needs aid in changing his dressings from his recent I/D of his abdominal incision abscess.     I certify that this patient needs to resume PT home care which was ordered from his prior surgical admission.

## 2023-01-15 NOTE — SUBJECTIVE & OBJECTIVE
Past Medical History:   Diagnosis Date    CAD (coronary artery disease), native coronary artery 11/21/2019    Cataract     Diabetes mellitus     Diabetic retinopathy     Dialysis patient     DM type 2 causing renal disease, not at goal     ESRD (end stage renal disease) started dialysis 01/2014 06/05/2014    Hyperparathyroidism, secondary renal 06/05/2014    Hypertension     NSTEMI (non-ST elevated myocardial infarction) 12/21/2013    Organ transplant candidate 06/05/2014    Pneumonia     Renal cell carcinoma of right kidney     Renal hypertension     Stroke        Past Surgical History:   Procedure Laterality Date    ANGIOGRAM, CORONARY, WITH LEFT HEART CATHETERIZATION N/A 7/1/2021    Procedure: Angiogram, Coronary, with Left Heart Cath;  Surgeon: Miki Zendejas MD;  Location: [facility] CATH LAB;  Service: Cardiology;  Laterality: N/A;    COLONOSCOPY N/A 5/3/2017    Procedure: COLONOSCOPY;  Surgeon: Rufino Carpenter MD;  Location: [facility] ([address] [place] [address]);  Service: Endoscopy;  Laterality: N/A;  pt states can only schedule on Wednesdays    COLONOSCOPY N/A 2/9/2021    Procedure: COLONOSCOPY;  Surgeon: Edil Wong MD;  [facility]  Service: Endoscopy;  Laterality: N/A;  Dialysis MWF/ labwork day of procedure  right arm aceess  per Dr. Colin pt can hold Plavix 5 days prior see note- sm  COVID test on 2/6/21 at [facility] - sm    COLONOSCOPY N/A 2/10/2021    Procedure: COLONOSCOPY;  Surgeon: Robert Ayers MD;  [facility]  Service: Endoscopy;  Laterality: N/A;  rescheduled due to poor bowel prep-BB  negative covid screening 2/6/21-BB  dialysis M-W-F-BB  okay to r/s for 2/9/21 and to hold Plavix per Dr. KIRAN Wong-BB  labs same day-BB    CORONARY STENT PLACEMENT N/A 7/25/2019    Procedure: INSERTION, STENT, CORONARY ARTERY;  Surgeon: Miki Zendejas MD;  Location: [facility] CATH LAB;  Service: Cardiology;  Laterality: N/A;    DIALYSIS FISTULA CREATION      FISTULOGRAM N/A 2/18/2019    Procedure:  Fistulogram;  Surgeon: Yaneth De La Cruz MD;  Location: [facility] CATH LAB;  Service: Cardiology;  Laterality: N/A;    FISTULOGRAM Right 7/23/2019    Procedure: Fistulogram;  Surgeon: Oz Cordoba MD;  Location: [facility] CATH LAB;  Service: Cardiology;  Laterality: Right;    LAPAROSCOPIC ROBOT-ASSISTED SURGICAL REMOVAL OF KIDNEY USING DA JAIME XI Right 5/19/2022    Procedure: XI ROBOTIC NEPHRECTOMY;  Surgeon: Aidna Hendricks MD;  [address]  Service: Urology;  Laterality: Right;  3hrs    LAPAROSCOPIC ROBOT-ASSISTED SURGICAL REMOVAL OF KIDNEY USING DA JAIME XI Left 1/5/2023    Procedure: XI ROBOTIC NEPHRECTOMY;  Surgeon: Aidan Hendricks MD;  [facility];  Service: Urology;  Laterality: Left;  4 hrs    LEFT HEART CATHETERIZATION Left 7/25/2019    Procedure: Left heart cath;  Surgeon: Miki Zendejas MD;  Location: [facility] CATH LAB;  Service: Cardiology;  Laterality: Left;    RETINAL LASER PROCEDURE Bilateral 2018 or 2017    Dr. Rothman    VASCULAR SURGERY         Review of patient's allergies indicates:   Allergen Reactions    No known allergies        Family History       Problem Relation (Age of Onset)    Cancer Maternal Grandfather    Cataracts Mother    Diabetes Mother    Heart disease Brother    Hypertension Mother, Father, Sister, Brother    Kidney disease Brother            Tobacco Use    Smoking status: Never    Smokeless tobacco: Never   Substance and Sexual Activity    Alcohol use: Not Currently     Comment: One drink a month    Drug use: No    Sexual activity: Yes     Partners: Female     Birth control/protection: None       Review of Systems   Constitutional:  Negative for activity change, chills and fever.   Respiratory:  Negative for shortness of breath.    Cardiovascular:  Negative for chest pain.   Gastrointestinal:  Positive for abdominal pain. Negative for diarrhea, nausea and vomiting.   Skin:  Positive for wound.   Neurological:  Negative for dizziness and  weakness.     Objective:     Temp:  [99.4 °F (37.4 °C)] 99.4 °F (37.4 °C)  Pulse:  [79-90] 85  Resp:  [13-18] 17  SpO2:  [93 %-98 %] 93 %  BP: (178-200)/() 182/85     Body mass index is 25.54 kg/m².           Drains       Drain  Duration                  Hemodialysis AV Fistula Right forearm -- days                    Physical Exam  Vitals reviewed.   Constitutional:       General: He is not in acute distress.     Appearance: He is well-developed. He is not diaphoretic.   HENT:      Head: Normocephalic and atraumatic.   Eyes:      General: No scleral icterus.  Cardiovascular:      Rate and Rhythm: Normal rate.   Pulmonary:      Effort: Pulmonary effort is normal. No respiratory distress.      Breath sounds: No stridor.   Abdominal:      General: There is no distension.      Palpations: Abdomen is soft.      Tenderness: There is no abdominal tenderness.      Comments: Abdomen soft, non distended, no peritoneal signs  The midline incision is actively draining purulent, sanguineous fluid with surrounding induration and fluctuance   Musculoskeletal:         General: Normal range of motion.      Cervical back: Normal range of motion.   Skin:     General: Skin is warm and dry.   Neurological:      Mental Status: He is alert.   Psychiatric:         Behavior: Behavior normal.       Significant Labs:    BMP:  Recent Labs   Lab 01/08/23 0310 01/09/23 0446 01/14/23  1745   * 130* 139   K 4.5 5.4* 4.0   CL 95 94* 101   CO2 19* 21* 25   BUN 55* 77* 37*   CREATININE 11.4* 14.4* 9.8*   CALCIUM 9.3 8.2* 9.9       CBC:  Recent Labs   Lab 01/08/23 0310 01/09/23  0446 01/14/23  1745   WBC 9.06 11.02 19.83*   HGB 7.6* 7.2* 7.7*   HCT 24.9* 23.4* 25.6*    302 448       Blood Culture: No results for input(s): LABBLOO in the last 168 hours.  Urine Culture: No results for input(s): LABURIN in the last 168 hours.  Urine Studies: No results for input(s): COLORU, APPEARANCEUA, PHUR, SPECGRAV, PROTEINUA, GLUCUA, KETONESU,  BILIRUBINUA, OCCULTUA, NITRITE, UROBILINOGEN, LEUKOCYTESUR, RBCUA, WBCUA, BACTERIA, SQUAMEPITHEL, HYALINECASTS in the last 168 hours.    Invalid input(s): SHABNAMR    Significant Imaging:  Findings as above

## 2023-01-15 NOTE — SUBJECTIVE & OBJECTIVE
Interval History: NAEON. AFVSS. Pain well controlled, minimal drainage from patient's wound. Tolerating diet and ambulating.       Objective:     Temp:  [96.8 °F (36 °C)-99.4 °F (37.4 °C)] 97.5 °F (36.4 °C)  Pulse:  [78-94] 78  Resp:  [13-19] 18  SpO2:  [92 %-98 %] 94 %  BP: (139-200)/() 168/81     Body mass index is 25.54 kg/m².           Drains       Drain  Duration                  Hemodialysis AV Fistula Right forearm -- days                    Physical Exam  Vitals reviewed.   Constitutional:       General: He is not in acute distress.     Appearance: He is well-developed. He is not diaphoretic.   HENT:      Head: Normocephalic and atraumatic.   Eyes:      General: No scleral icterus.  Cardiovascular:      Rate and Rhythm: Normal rate.   Pulmonary:      Effort: Pulmonary effort is normal. No respiratory distress.      Breath sounds: No stridor.   Abdominal:      General: There is no distension.      Palpations: Abdomen is soft.      Tenderness: There is no abdominal tenderness.      Comments: Abdomen soft, non distended, no peritoneal signs  The superior portion of the midline incision is opened (~2.5-3.5cm) and packed with gauze strips. There is minimal SS drainage from the wound site without surrounding erythema, warmth or fluctuance.    Musculoskeletal:         General: Normal range of motion.      Cervical back: Normal range of motion.   Skin:     General: Skin is warm and dry.   Neurological:      Mental Status: He is alert.   Psychiatric:         Behavior: Behavior normal.       Significant Labs:    BMP:  Recent Labs   Lab 01/09/23  0446 01/14/23  1745 01/15/23  0252   * 139 138   K 5.4* 4.0 4.0   CL 94* 101 99   CO2 21* 25 26   BUN 77* 37* 40*   CREATININE 14.4* 9.8* 12.2*   CALCIUM 8.2* 9.9 9.3       CBC:   Recent Labs   Lab 01/09/23  0446 01/14/23  1745 01/15/23  0252   WBC 11.02 19.83* 18.30*   HGB 7.2* 7.7* 7.4*   HCT 23.4* 25.6* 24.3*    448 443       All pertinent labs results  from the past 24 hours have been reviewed.    Significant Imaging:  All pertinent imaging results/findings from the past 24 hours have been reviewed.

## 2023-01-15 NOTE — ED PROVIDER NOTES
Encounter Date: 1/14/2023       History     Chief Complaint   Patient presents with    Abdominal Pain     Second nephrectomy earlier this month. Has been home for a week and has not had BM since the surgery. C/o abd pain starting at dialysis yest. Fell today onto back which worsened the pain 7/10. Since the fall, incision is leaking clear fluid. HTN.      HPI  Patient is a 47-year-old male with history of hypertension, end-stage renal disease on dialysis Monday Wednesday Friday with last full run yesterday, type 2 diabetes, CAD, prior CVA, renal cell carcinoma status post right nephrectomy on 05/19/2022 and now status post left nephrectomy with Dr. Hendricks on 01/06/23 who presents for pain associated with his incision.  Patient states that his midline incision has been painful and more red today.  He does state that he noticed some clear/white drainage from the area earlier today after he fell.  Patient states that he was wearing socks on a slippery floor and fell onto his buttocks.  He denies hitting his head or loss of consciousness.  He denies any pain after his fall accept continued pain in his incision area.  He denies fevers, chills, nausea, vomiting, diarrhea.  He does state that he has been constipated has not had a bowel movement since he was discharged from the hospital.  He states that he is still eating well at home and tolerating p.o. intake well.  Patient does not make urine.  He states that he had a full run of dialysis yesterday and denies chest pain, shortness of breath, leg swelling.    Review of patient's allergies indicates:   Allergen Reactions    No known allergies      Past Medical History:   Diagnosis Date    CAD (coronary artery disease), native coronary artery 11/21/2019    Cataract     Diabetes mellitus     Diabetic retinopathy     Dialysis patient     DM type 2 causing renal disease, not at goal     ESRD (end stage renal disease) started dialysis 01/2014 06/05/2014    Hyperparathyroidism,  secondary renal 06/05/2014    Hypertension     NSTEMI (non-ST elevated myocardial infarction) 12/21/2013    Organ transplant candidate 06/05/2014    Pneumonia     Renal cell carcinoma of right kidney     Renal hypertension     Stroke      Past Surgical History:   Procedure Laterality Date    ANGIOGRAM, CORONARY, WITH LEFT HEART CATHETERIZATION N/A 7/1/2021    Procedure: Angiogram, Coronary, with Left Heart Cath;  Surgeon: Miki Zendejas MD;  Location: [facility] CATH LAB;  Service: Cardiology;  Laterality: N/A;    COLONOSCOPY N/A 5/3/2017    Procedure: COLONOSCOPY;  Surgeon: Rufino Carpenter MD;  Location: [facility] ENDO (4TH FLR);  Service: Endoscopy;  Laterality: N/A;  pt states can only schedule on Wednesdays    COLONOSCOPY N/A 2/9/2021    Procedure: COLONOSCOPY;  Surgeon: Edil Wong MD;  Location: [facility] (4TH FLR);  Service: Endoscopy;  Laterality: N/A;  Dialysis MWF/ labwork day of procedure  right arm aceess  per Dr. Colin pt can hold Plavix 5 days prior see note- sm  COVID test on 2/6/21 at [facility] -     COLONOSCOPY N/A 2/10/2021    Procedure: COLONOSCOPY;  Surgeon: Robert Ayers MD;  Location: [facility] ENDO (4TH FLR);  Service: Endoscopy;  Laterality: N/A;  rescheduled due to poor bowel prep-BB  negative covid screening 2/6/21-BB  dialysis M-W-F-BB  okay to r/s for 2/9/21 and to hold Plavix per Dr. KIRAN Wong-BB  labs same day-BB    CORONARY STENT PLACEMENT N/A 7/25/2019    Procedure: INSERTION, STENT, CORONARY ARTERY;  Surgeon: Miik Zendejas MD;  Location: [facility] CATH LAB;  Service: Cardiology;  Laterality: N/A;    DIALYSIS FISTULA CREATION      FISTULOGRAM N/A 2/18/2019    Procedure: Fistulogram;  Surgeon: Yaneth De La Cruz MD;  Location: [facility] CATH LAB;  Service: Cardiology;  Laterality: N/A;    FISTULOGRAM Right 7/23/2019    Procedure: Fistulogram;  Surgeon: Oz Cordoba MD;  Location: [facility] CATH LAB;  Service: Cardiology;  Laterality: Right;    LAPAROSCOPIC ROBOT-ASSISTED SURGICAL REMOVAL OF KIDNEY USING  DA JAIME XI Right 5/19/2022    Procedure: XI ROBOTIC NEPHRECTOMY;  Surgeon: Aidan Hendricks MD;  Location: [address];  Service: Urology;  Laterality: Right;  3hrs    LAPAROSCOPIC ROBOT-ASSISTED SURGICAL REMOVAL OF KIDNEY USING DA JAIME XI Left 1/5/2023    Procedure: XI ROBOTIC NEPHRECTOMY;  Surgeon: Aidan Hendricks MD;  [facility];  Service: Urology;  Laterality: Left;  4 hrs    LEFT HEART CATHETERIZATION Left 7/25/2019    Procedure: Left heart cath;  Surgeon: Miki Zendejas MD;  Location: [facility] CATH LAB;  Service: Cardiology;  Laterality: Left;    RETINAL LASER PROCEDURE Bilateral 2018 or 2017    Dr. Rothman    VASCULAR SURGERY       Family History   Problem Relation Age of Onset    Hypertension Mother     Diabetes Mother     Cataracts Mother     Hypertension Father     Hypertension Sister     Kidney disease Brother     Hypertension Brother     Heart disease Brother     Cancer Maternal Grandfather         Colon CA    Colon cancer Neg Hx     Esophageal cancer Neg Hx     Stomach cancer Neg Hx     Rectal cancer Neg Hx     Ulcerative colitis Neg Hx     Irritable bowel syndrome Neg Hx     Crohn's disease Neg Hx     Celiac disease Neg Hx     Glaucoma Neg Hx     Macular degeneration Neg Hx      Social History     Tobacco Use    Smoking status: Never    Smokeless tobacco: Never   Substance Use Topics    Alcohol use: Not Currently     Comment: One drink a month    Drug use: No     Review of Systems  Please see pertinent review of systems in HPI.    Physical Exam     Initial Vitals [01/14/23 1657]   BP Pulse Resp Temp SpO2   (!) 200/100 90 18 99.4 °F (37.4 °C) 98 %      MAP       --         Physical Exam  Constitutional: No acute distress, nontoxic appearing  Respiratory: Non-labored, lungs clear  Cardiovascular: Well perfused, normal rate, regular rhythm  Gastrointestinal: Soft, nondistended, midline incision intact without expressible discharge however there is surrounding erythema and warmth  as well as fluctuance in the area, this area is tender upon palpation  Integumentary: Warm and dry  Musculoskeletal: No deformity  Neurological: Awake and alert, normal motor in all extremities, sensation light touch intact in all extremities, cranial nerves 2-12 grossly intact  Psychiatric: Cooperative     ED Course   Procedures  Labs Reviewed   CBC W/ AUTO DIFFERENTIAL - Abnormal; Notable for the following components:       Result Value    WBC 19.83 (*)     RBC 2.54 (*)     Hemoglobin 7.7 (*)     Hematocrit 25.6 (*)      (*)     MCHC 30.1 (*)     RDW 14.6 (*)     MPV 8.8 (*)     Immature Granulocytes 1.3 (*)     Gran # (ANC) 15.5 (*)     Immature Grans (Abs) 0.26 (*)     Mono # 1.8 (*)     Eos # 0.6 (*)     Gran % 78.3 (*)     Lymph % 8.1 (*)     All other components within normal limits   COMPREHENSIVE METABOLIC PANEL - Abnormal; Notable for the following components:    BUN 37 (*)     Creatinine 9.8 (*)     Albumin 2.8 (*)     ALT <5 (*)     eGFR 6.1 (*)     All other components within normal limits   CULTURE, BLOOD   CULTURE, BLOOD   HIV 1 / 2 ANTIBODY    Narrative:     Release to patient->Immediate   HEPATITIS C ANTIBODY    Narrative:     Release to patient->Immediate   LACTIC ACID, PLASMA          Imaging Results               CT Abdomen Pelvis With Contrast (Final result)  Result time 01/14/23 20:35:34      Final result by Oli Barnard MD (01/14/23 20:35:34)                   Impression:      1. Interval postoperative change of left nephrectomy, noting trace volume non organized free fluid within the resection bed.  No organized intra-abdominal fluid collection.  Multiple new foci of intraperitoneal free air, possibly reflecting postoperative change, however, bowel perforation cannot be completely excluded.  2. Subincisional fluid collection, likely representing postoperative seroma or hematoma.  3. New trace volume abdominopelvic ascites, likely reactive.  4. New patchy opacifications with  scattered air bronchograms within the bilateral lung bases, possibly representing atelectasis or infectious/inflammatory process.  5. Hepatomegaly.  6. Additional findings as above.    This report was flagged in Epic as abnormal.    Electronically signed by resident: Ijeoma Mcfadden  Date:    01/14/2023  Time:    19:45    Electronically signed by: Oli Barnard MD  Date:    01/14/2023  Time:    20:35               Narrative:    EXAMINATION:  CT ABDOMEN PELVIS WITH CONTRAST    CLINICAL HISTORY:  Abdominal abscess/infection suspected;    TECHNIQUE:  Routine axial CT images of the abdomen were obtained after administration 75cc of IV Omnipaque 350.  Coronal and Sagittal reformatted images were also obtained.    COMPARISON:  CT abdomen pelvis without contrast 11/30/2022.    FINDINGS:  Heart: Mildly enlarged.  Small volume pericardial effusion, similar to prior.  Multi-vessel calcific atherosclerosis of the coronary arteries.  Calcification of the aortic valve annulus and aortic leaflets.  Calcification of the mitral valve annulus.    Lungs: New patchy opacifications with few scattered air bronchograms within the bilateral lung bases, left more than right, possibly representing atelectasis or infectious/inflammatory process.  No pleural effusion.    Liver: Hepatomegaly measuring 19.5 cm.  Normal attenuation.  Right hepatic lobe 0.5 cm hepatic hypodensity, too small to characterize.    Gallbladder/biliary system: No calcified gallstones.  No intrahepatic or extrahepatic ductal dilatation.    Pancreas: No mass or peripancreatic fat stranding.    Spleen: Unremarkable.    GI Tract/ Mesentery: The stomach is unremarkable.  No evidence of bowel obstruction or inflammation. The appendix is unremarkable.  No pneumatosis or portal venous gas.    Adrenals: Unremarkable.    Kidneys/ Ureters: Remote right nephrectomy, similar to prior.  Interval postoperative change of left nephrectomy.  Trace volume non organized free fluid at the  resection bed.  No discrete mass or organized fluid collection seen at this time.    Bladder: The bladder is not distended, likely relating to appearance of circumferential bladder wall thickening.    Reproductive organs/pelvis: The prostate is within normal limits.  Pelvic phleboliths noted.  Trace volume presacral edema new from prior likely from upper abdominal free fluid tracking to the dependent aspect of the pelvis.    Retroperitoneum: No significant adenopathy.    Peritoneal space: New trace free fluid predominantly within the right upper quadrant, posterior to the right hepatic lobe and within the left renal fossa.  New small volume of free air within the peritoneal cavity, may represent postoperative change, however, bowel perforation cannot be completely excluded.    Abdominal wall: Postoperative change of midline incision and trocar insertion with fat stranding and air within the midline abdominal incision and left lateral anterior abdominal wall.  Subincisional fluid collection measuring approximately 1.6 x 4.5 x 6.8 cm AP by TV by CC (axial series 2, image 102 and sagittal series 602, image 104), likely representing postoperative seroma or hematoma.  Mild body wall edema, predominant along the anterior abdominal wall..  Mild bilateral gynecomastia.  Tiny fat containing umbilical hernia.  Few surgical clips right of midline of the gluteal cleft.  Metallic density posterior to the left inferior pubic ramus.    Vasculature: Mild calcific atherosclerosis of the abdominal aorta.  No evidence of aneurysmal dilatation.    Bones: Degenerative disease of the osseous structures with no acute fractures or lytic lesions.                                       Medications   carvediloL tablet 25 mg (25 mg Oral Given 1/14/23 1851)   amLODIPine tablet 10 mg (10 mg Oral Given 1/14/23 1851)   hydrALAZINE tablet 25 mg (25 mg Oral Given 1/14/23 1851)   clindamycin in D5W 600 mg/50 mL IVPB 600 mg (0 mg Intravenous Stopped  1/14/23 1953)   iohexoL (OMNIPAQUE 350) injection 75 mL (75 mLs Intravenous Given 1/14/23 1930)   LIDOcaine HCL 10 mg/ml (1%) injection 10 mL (10 mLs Other Given 1/14/23 2145)     Medical Decision Making:   History:   Old Records Summarized: records from clinic visits and records from previous admission(s).  Clinical Tests:   Lab Tests: Ordered and Reviewed  Radiological Study: Ordered and Reviewed  Medical Tests: Reviewed and Ordered  ED Management:  Patient is a 47-year-old male who presents for pain and drainage associated with his midline surgical incision where he underwent a nephrectomy on 01/05/2023.  No associated fevers or chills.  He is afebrile here.  His vitals are notable for hypertension.  Patient did not take his blood pressure medications today.  He is asymptomatic from his hypertension.  Home blood pressure medicines were ordered with some improvement of his blood pressure.  Labs, CT and empiric antibiotics ordered.    Labs reviewed.  Patient does have a leukocytosis.  His lactate is normal.  CT pending.  Urology consulted.    CT reviewed.  Urology able to see the patient at bedside.  They were able to open up his incision and drained a large amount of pus.  They plan to admit to their service.  Patient covered with clinda for now but urology planning to discuss with ID and pharmacy given that the patient has bilateral nephrectomies.           ED Course as of 01/14/23 2200   Sat Jan 14, 2023   1824 WBC(!): 19.83 [NN]   1825 Hemoglobin(!): 7.7  Baseline [NN]   1845 EKG with normal sinus rhythm, 83, no STEMI. [NN]      ED Course User Index  [NN] Montse Lmia MD                 Clinical Impression:   Final diagnoses:  [G89.18] Post-op pain  [T81.41XA] Infection of superficial incisional surgical site after procedure, initial encounter (Primary)               Montse Lima MD  01/14/23 2200

## 2023-01-15 NOTE — CARE UPDATE
"Patient seen and examined at bedside. Nephrology consulted for inpatient dialysis needs. Last HD session on 01/13/2023. No acute need for RRT today. Notified by Primary team they plan to discharge patient today. Patient to go to his outpatient HD unit on at his scheduled time tomorrow to receive HD.     Inpatient consult to Nephrology  Consult performed by: Harry Houston DO  Consult ordered by: Fili Marcelino MD  Reason for consult: "HD'           "

## 2023-01-15 NOTE — DISCHARGE SUMMARY
[facility]  Urology  Discharge Summary      Patient Name: Haroldo Dickinson  MRN: [id number]  Admission Date: 1/14/2023  Hospital Length of Stay: 2 days  Discharge Date and Time: 1/16/2023  4:16 PM  Attending Physician: Charly Cote Jr., MD   Discharging Provider: Yosi Reagan MD  Primary Care Physician: Tess Ledbetter MD    HPI:   Haroldo Dickinson is a 46 yo M with a history of ESRD s/p bilateral nephrectomies now HD dependent three times weekly, HTN, HLD, DM, CAD, presenting for abdominal wall drainage. Urology was consulted for post op SSI.    Patient was discharged on 1/9/23 after a L robotic nephrectomy with Dr. Hendricks on 1/4/23. During initial insufflation there was a noted verress injury to colon which was repaired by general surgery. Patient states that he has been doing pretty well at home. Tolerating a diet, no nausea, vomiting. Passing gas, has not had a bowel movement since surgery. Some belching. Denies fevers, chills. Started with some abdominal wall drainage earlier today.    On assessment, patient is AF, HDS. WBC to 19, ESRD on HD. Lactate wnl. CT scan shows expected intraabdominal findings after robotic surgery. There is a fluid collection along the midline incision anterior to the rectus, no obvious bowel distension or bowel in the incision.         * No surgery found *     Indwelling Lines/Drains at time of discharge:   Lines/Drains/Airways       Drain  Duration                  Hemodialysis AV Fistula Right forearm -- days                    Hospital Course (synopsis of major diagnoses, care, treatment, and services provided during the course of the hospital stay):  Haroldo Dickinson was admitted on 1/14/23 for superficial wound dehiscence of his midline abdominal incision from a recent b/l nephrectomy. His wound was I/D'ed and packed. On HOD1 he was AFVSS, tolerating oral diet, ambulating and with pain controlled. His wound was re-dressed and packed (the patient was taught how to do this at  bedside on HOD1). Once medically stable and meeting all requirements for d/c, the patient was d/c'ed on 1/16/23 following HD while inpt and toleration of his PO diet along with stable hgb. Plan to see Dr. Hendricks on 1/31/22. The patient will continue to perform daily dressing changes with the help of home health and take his doxy for an additional 10 days.     Medications and instructions as below.  For more thorough information, please refer to the hospital record and operative report.    Consults:   Consults (From admission, onward)          Status Ordering Provider     Inpatient consult to Nephrology  Once        Provider:  (Not yet assigned)    Completed NORMAN NGUYEN     Inpatient consult to Urology  Once        Provider:  (Not yet assigned)    Completed ALE REYES            Significant Diagnostic Studies:     Pending Diagnostic Studies:       None            Final Active Diagnoses:    Diagnosis Date Noted POA    PRINCIPAL PROBLEM:  S/p nephrectomy [Z90.5] 05/20/2022 Not Applicable    End stage renal failure on dialysis [N18.6, Z99.2] 06/05/2014 Not Applicable      Problems Resolved During this Admission:       Discharged Condition: good    Disposition: Home or Self Care    Follow Up:   Follow-up Information       Aidan Hendricks MD Follow up on 1/31/2023.    Specialty: Urology  Why: Post-op, For wound re-check  Contact information:  [address] [place] [id number] [phone]                           Patient Instructions:      Lifting restrictions   Order Comments: Do not drive while taking pain medications. No strenuous activity or lifting greater than 10 pounds for 4 weeks.     No dressing needed   Order Comments: Do not soak incisions in tub or bath and do not scrub incisions. You may shower over incisions. If you have surgical glue in place, the glue will come off over the next few weeks.     Notify your health care provider if you experience any of the following:  temperature >100.4      Notify your health care provider if you experience any of the following:  persistent nausea and vomiting or diarrhea     Notify your health care provider if you experience any of the following:  severe uncontrolled pain     Notify your health care provider if you experience any of the following:  redness, tenderness, or signs of infection (pain, swelling, redness, odor or green/yellow discharge around incision site)     Notify your health care provider if you experience any of the following:  difficulty breathing or increased cough     Notify your health care provider if you experience any of the following:  persistent dizziness, light-headedness, or visual disturbances     Medications:  Reconciled Home Medications:      Medication List        START taking these medications      doxycycline 100 MG tablet  Commonly known as: VIBRA-TABS  Take 1 tablet (100 mg total) by mouth every 12 (twelve) hours for 10 days            CONTINUE taking these medications      amLODIPine 10 MG tablet  Commonly known as: NORVASC  Take 1 tablet (10 mg total) by mouth once daily.     aspirin 81 MG EC tablet  Commonly known as: ECOTRIN  Take 1 tablet (81 mg total) by mouth once daily.     AURYXIA 210 mg iron Tab  Generic drug: ferric citrate  Take 420 mg by mouth 3 (three) times daily with meals. Take 1 with snacks     carvediloL 25 MG tablet  Commonly known as: COREG  TAKE 1 TABLET BY MOUTH TWICE A DAY WITH FOOD     cinacalcet 90 MG Tab  Commonly known as: SENSIPAR  Take 1 tablet by mouth once daily.     hydrALAZINE 25 MG tablet  Commonly known as: APRESOLINE  Take 1 tablet (25 mg total) by mouth every 8 (eight) hours. for SBP > 140mm HG     methocarbamoL 500 MG Tab  Commonly known as: ROBAXIN  Take 1 tablet (500 mg total) by mouth 4 (four) times daily. for 10 days     OPW TEST CLAIM - DO NOT FILL  OPW test claim. Do not fill.     oxyCODONE 5 MG immediate release tablet  Commonly known as: ROXICODONE  Take 2 tablets (10 mg total) by  mouth every 4 (four) hours as needed for Pain.     polyethylene glycol 17 gram/dose powder  Commonly known as: GLYCOLAX  Use cap to measure 17 g, then mix in liquid and drink by mouth once daily.     sevelamer carbonate 800 mg Tab  Commonly known as: RENVELA  Take 800 mg by mouth 3 (three) times daily with meals.            ASK your doctor about these medications      acetaminophen 500 MG tablet  Commonly known as: TYLENOL  Take 2 tablets (1,000 mg total) by mouth every 8 (eight) hours. for 7 days  Ask about: Should I take this medication?              Time spent on the discharge of patient: 15 minutes    Yosi Reagan MD  Urology  [facility] - Surgery

## 2023-01-15 NOTE — ASSESSMENT & PLAN NOTE
Haroldo Dickinson is a 46 yo M with a history of ESRD s/p bilateral nephrectomies now HD dependent three times weekly, HTN, HLD, DM, CAD, presenting for abdominal wall drainage. Urology was consulted for post op SSI.    - bedside I&D of anterior abdominal wall abscess on 1/15/23  - fluid collected for culture, pending this AM  - currently on Doxy, will tailor to cultures  - will consult HM for comanagement of medical comorbidities and assistance with possible pna  - consult to nephrology for HD recs  - diabetic diet  - dressing and packing changed at bedside, patient taught the steps for continued daily packing changes at home  - possible d/c later today pending HM and Nephro recs

## 2023-01-15 NOTE — PROGRESS NOTES
Patient arrived to floor from ED via stretcher. Patient is awake, alert, and oriented. Skin is warm and dry. Pulses are present in all extremities. Neurovascularly intact. AV fistula in place. Good thrill and bruit noted. Abdomen is slightly tender to touch with hypoactive bowel sounds. Patient ambulates but does not void. No skin breakdown noted. Minimal complaints of abdominal discomfort noted. Will be NPO for upcoming procedure. Oriented to environment. Will continue to monitor.

## 2023-01-15 NOTE — PROGRESS NOTES
Nurses Note -- 4 Eyes      1/15/2023   12:44 AM      Skin assessed during: Admit      [x] No Pressure Injuries Present    [x]Prevention Measures Documented      [] Yes- Altered Skin Integrity Present or Discovered   [] LDA Added if Not in Epic (Describe Wound)   [] New Altered Skin Integrity was Present on Admit and Documented in LDA   [] Wound Image Taken    Wound Care Consulted?   No  Attending Nurse:  Sheree Loo RN     Second RN/Staff Member:  Bev Camp RN

## 2023-01-15 NOTE — ASSESSMENT & PLAN NOTE
Haroldo Dickinson is a 48 yo M with a history of ESRD s/p bilateral nephrectomies now HD dependent three times weekly, HTN, HLD, DM, CAD, presenting for abdominal wall drainage. Urology was consulted for post op SSI.    - will admit to urology service  - bedside I&D of anterior abdominal wall abscess  - fluid collected for culture  - broad spectrum antibiotics, will tailor to cultures  - will consult  for comanagement of medical comorbidities and assistance with possible pna  - diabetic diet

## 2023-01-15 NOTE — PROGRESS NOTES
[facility]  Urology  Progress Note    Patient Name: Haroldo Dickinson  MRN: [id number]  Admission Date: 1/14/2023  Hospital Length of Stay: 1 days  Code Status: Full Code   Attending Provider: Charly Cote Jr., MD   Primary Care Physician: Tess Ledbetter MD    Subjective:     HPI:  Haroldo Dickinson is a 46 yo M with a history of ESRD s/p bilateral nephrectomies now HD dependent three times weekly, HTN, HLD, DM, CAD, presenting for abdominal wall drainage. Urology was consulted for post op SSI.    Patient was discharged on 1/9/23 after a L robotic nephrectomy with Dr. Hendricks on 1/4/23. During initial insufflation there was a noted verress injury to colon which was repaired by general surgery. Patient states that he has been doing pretty well at home. Tolerating a diet, no nausea, vomiting. Passing gas, has not had a bowel movement since surgery. Some belching. Denies fevers, chills. Started with some abdominal wall drainage earlier today.    On assessment, patient is AF, HDS. WBC to 19, ESRD on HD. Lactate wnl. CT scan shows expected intraabdominal findings after robotic surgery. There is a fluid collection along the midline incision anterior to the rectus, no obvious bowel distension or bowel in the incision.          Interval History: NAEON. AFVSS. Pain well controlled, minimal drainage from patient's wound. Tolerating diet and ambulating.       Objective:     Temp:  [96.8 °F (36 °C)-99.4 °F (37.4 °C)] 97.5 °F (36.4 °C)  Pulse:  [78-94] 78  Resp:  [13-19] 18  SpO2:  [92 %-98 %] 94 %  BP: (139-200)/() 168/81     Body mass index is 25.54 kg/m².           Drains       Drain  Duration                  Hemodialysis AV Fistula Right forearm -- days                    Physical Exam  Vitals reviewed.   Constitutional:       General: He is not in acute distress.     Appearance: He is well-developed. He is not diaphoretic.   HENT:      Head: Normocephalic and atraumatic.   Eyes:      General: No scleral  icterus.  Cardiovascular:      Rate and Rhythm: Normal rate.   Pulmonary:      Effort: Pulmonary effort is normal. No respiratory distress.      Breath sounds: No stridor.   Abdominal:      General: There is no distension.      Palpations: Abdomen is soft.      Tenderness: There is no abdominal tenderness.      Comments: Abdomen soft, non distended, no peritoneal signs  The superior portion of the midline incision is opened (~2.5-3.5cm) and packed with gauze strips. There is minimal SS drainage from the wound site without surrounding erythema, warmth or fluctuance.    Musculoskeletal:         General: Normal range of motion.      Cervical back: Normal range of motion.   Skin:     General: Skin is warm and dry.   Neurological:      Mental Status: He is alert.   Psychiatric:         Behavior: Behavior normal.       Significant Labs:    BMP:  Recent Labs   Lab 01/09/23  0446 01/14/23  1745 01/15/23  0252   * 139 138   K 5.4* 4.0 4.0   CL 94* 101 99   CO2 21* 25 26   BUN 77* 37* 40*   CREATININE 14.4* 9.8* 12.2*   CALCIUM 8.2* 9.9 9.3       CBC:   Recent Labs   Lab 01/09/23  0446 01/14/23  1745 01/15/23  0252   WBC 11.02 19.83* 18.30*   HGB 7.2* 7.7* 7.4*   HCT 23.4* 25.6* 24.3*    448 443       All pertinent labs results from the past 24 hours have been reviewed.    Significant Imaging:  All pertinent imaging results/findings from the past 24 hours have been reviewed.                    Assessment/Plan:     * S/p nephrectomy  Haroldo Dickinson is a 48 yo M with a history of ESRD s/p bilateral nephrectomies now HD dependent three times weekly, HTN, HLD, DM, CAD, presenting for abdominal wall drainage. Urology was consulted for post op SSI.    - bedside I&D of anterior abdominal wall abscess on 1/15/23  - fluid collected for culture, pending this AM  - currently on Doxy, will tailor to cultures  - will consult HM for comanagement of medical comorbidities and assistance with possible pna  - consult to nephrology  for HD recs  - diabetic diet  - dressing and packing changed at bedside, patient taught the steps for continued daily packing changes at home  - possible d/c later today pending HM and Nephro recs          VTE Risk Mitigation (From admission, onward)         Ordered     heparin (porcine) injection 5,000 Units  Every 8 hours         01/14/23 2239     IP VTE HIGH RISK PATIENT  Once         01/14/23 2239     Place sequential compression device  Until discontinued         01/14/23 2239     Place AMANUEL hose  Until discontinued         01/14/23 2239     Place sequential compression device  Until discontinued         01/14/23 2239                Yosi Reagan MD  Urology  [facility] - Surgery   Opioid Counseling: I discussed with the patient the potential side effects of opioids including but not limited to addiction, altered mental status, and depression. I stressed avoiding alcohol, benzodiazepines, muscle relaxants and sleep aids unless specifically okayed by a physician. The patient verbalized understanding of the proper use and possible adverse effects of opioids. All of the patient's questions and concerns were addressed. They were instructed to flush the remaining pills down the toilet if they did not need them for pain.

## 2023-01-15 NOTE — HPI
Haroldo Dickinson is a 46 yo M with a history of ESRD s/p bilateral nephrectomies now HD dependent three times weekly, HTN, HLD, DM, CAD, presenting for abdominal wall drainage. Urology was consulted for post op SSI.    Patient was discharged on 1/9/23 after a L robotic nephrectomy with Dr. Hendricks on 1/4/23. During initial insufflation there was a noted verress injury to colon which was repaired by general surgery. Patient states that he has been doing pretty well at home. Tolerating a diet, no nausea, vomiting. Passing gas, has not had a bowel movement since surgery. Some belching. Denies fevers, chills. Started with some abdominal wall drainage earlier today.    On assessment, patient is AF, HDS. WBC to 19, ESRD on HD. Lactate wnl. CT scan shows expected intraabdominal findings after robotic surgery. There is a fluid collection along the midline incision anterior to the rectus, no obvious bowel distension or bowel in the incision.

## 2023-01-15 NOTE — DISCHARGE INSTRUCTIONS
Wound Care Instructions: Please change your wound's packing daily. First, remove all external bandaged and then remove the steri-strip packing in its entirety from your wound. Please then take one long strip of packing and gently pack it into the wound's opening filling all spaces. Cut the strip to ensure that only one single packing strip is in the wound.     No heavy lifting greater than 10 pounds or a gallon of milk  Do not strain to have a bowel movement  No strenuous exercise  You may sponge bathe  No driving while you are on narcotic pain medications     Call the doctor if:  Temperature is greater than 101F  Persistent vomiting and inability to keep food down  Inability to pee

## 2023-01-15 NOTE — CONSULTS
"[facility]  Urology  Consult Note    Patient Name: Haroldo Dickinson  MRN: [id number]  Admission Date: 1/14/2023  Hospital Length of Stay: 0   Code Status: Prior   Attending Provider: Montse Lima MD   Consulting Provider: Fili Marcelino MD  Primary Care Physician: Tess Ledbetter MD  Principal Problem:<principal problem not specified>    Inpatient consult to Urology  Consult performed by: Fili Marcelino MD  Consult ordered by: Montse Lima MD          Subjective:     HPI:  Haroldo Dickinson is a 46 yo M with a history of ESRD s/p bilateral nephrectomies now HD dependent three times weekly, HTN, HLD, DM, CAD, presenting for abdominal wall drainage. Urology was consulted for post op SSI.    Patient was discharged on 1/9/23 after a L robotic nephrectomy with Dr. Hendricks on 1/4/23. During initial insufflation there was a noted verress injury to colon which was repaired by general surgery. Patient states that he has been doing pretty well at home. Tolerating a diet, no nausea, vomiting. Passing gas, has not had a bowel movement since surgery. Some belching. Denies fevers, chills. Started with some abdominal wall drainage earlier today.    On assessment, patient is AF, HDS. WBC to 19, ESRD on HD. Lactate wnl. CT scan shows expected intraabdominal findings after robotic surgery. There is a fluid collection along the midline incision anterior to the rectus, no obvious bowel distension or bowel in the incision.      Procedure:  After consent was obtained, the anterior abdominal wall over the area of greatest fluctuance was prepped and draped. The area was infiltrated with 10 cc 1% lidocaine. An 11 blade was used to open the superior aspect of the previous midline incision. Purulence was encountered which was collected as a specimen. A hemostat was used to break up loculations circumferentially. After the wound was adequately drained, it was irrigated using normal saline and packed with 1/2" strip " packing.    Past Medical History:   Diagnosis Date    CAD (coronary artery disease), native coronary artery 11/21/2019    Cataract     Diabetes mellitus     Diabetic retinopathy     Dialysis patient     DM type 2 causing renal disease, not at goal     ESRD (end stage renal disease) started dialysis 01/2014 06/05/2014    Hyperparathyroidism, secondary renal 06/05/2014    Hypertension     NSTEMI (non-ST elevated myocardial infarction) 12/21/2013    Organ transplant candidate 06/05/2014    Pneumonia     Renal cell carcinoma of right kidney     Renal hypertension     Stroke        Past Surgical History:   Procedure Laterality Date    ANGIOGRAM, CORONARY, WITH LEFT HEART CATHETERIZATION N/A 7/1/2021    Procedure: Angiogram, Coronary, with Left Heart Cath;  Surgeon: Miki Zendejas MD;  Location: [facility] CATH LAB;  Service: Cardiology;  Laterality: N/A;    COLONOSCOPY N/A 5/3/2017    Procedure: COLONOSCOPY;  Surgeon: Rufino Carpenter MD;  [facility]  Service: Endoscopy;  Laterality: N/A;  pt states can only schedule on Wednesdays    COLONOSCOPY N/A 2/9/2021    Procedure: COLONOSCOPY;  Surgeon: Edil Wong MD;  [facility]  Service: Endoscopy;  Laterality: N/A;  Dialysis MWF/ labwork day of procedure  right arm aceess  per Dr. Colin pt can hold Plavix 5 days prior see note- sm  COVID test on 2/6/21 at [facility] - sm    COLONOSCOPY N/A 2/10/2021    Procedure: COLONOSCOPY;  Surgeon: Robert Ayers MD;  [facility]  Service: Endoscopy;  Laterality: N/A;  rescheduled due to poor bowel prep-BB  negative covid screening 2/6/21-BB  dialysis M-W-F-BB  okay to r/s for 2/9/21 and to hold Plavix per Dr. KIRAN Wong-BB  labs same day-BB    CORONARY STENT PLACEMENT N/A 7/25/2019    Procedure: INSERTION, STENT, CORONARY ARTERY;  Surgeon: Miki Zendejas MD;  Location: [facility] CATH LAB;  Service: Cardiology;  Laterality: N/A;    DIALYSIS FISTULA CREATION      FISTULOGRAM N/A 2/18/2019     Procedure: Fistulogram;  Surgeon: Yaneth De La Cruz MD;  Location: [facility] CATH LAB;  Service: Cardiology;  Laterality: N/A;    FISTULOGRAM Right 7/23/2019    Procedure: Fistulogram;  Surgeon: Oz Cordoba MD;  Location: [facility] CATH LAB;  Service: Cardiology;  Laterality: Right;    LAPAROSCOPIC ROBOT-ASSISTED SURGICAL REMOVAL OF KIDNEY USING DA JAIME XI Right 5/19/2022    Procedure: XI ROBOTIC NEPHRECTOMY;  Surgeon: Aidan Hendricks MD;  [address]  Service: Urology;  Laterality: Right;  3hrs    LAPAROSCOPIC ROBOT-ASSISTED SURGICAL REMOVAL OF KIDNEY USING DA JAIME XI Left 1/5/2023    Procedure: XI ROBOTIC NEPHRECTOMY;  Surgeon: Aidan Hendricks MD;  [facility];  Service: Urology;  Laterality: Left;  4 hrs    LEFT HEART CATHETERIZATION Left 7/25/2019    Procedure: Left heart cath;  Surgeon: Miki Zendejas MD;  Location: [facility] CATH LAB;  Service: Cardiology;  Laterality: Left;    RETINAL LASER PROCEDURE Bilateral 2018 or 2017    Dr. Rothman    VASCULAR SURGERY         Review of patient's allergies indicates:   Allergen Reactions    No known allergies        Family History       Problem Relation (Age of Onset)    Cancer Maternal Grandfather    Cataracts Mother    Diabetes Mother    Heart disease Brother    Hypertension Mother, Father, Sister, Brother    Kidney disease Brother            Tobacco Use    Smoking status: Never    Smokeless tobacco: Never   Substance and Sexual Activity    Alcohol use: Not Currently     Comment: One drink a month    Drug use: No    Sexual activity: Yes     Partners: Female     Birth control/protection: None       Review of Systems   Constitutional:  Negative for activity change, chills and fever.   Respiratory:  Negative for shortness of breath.    Cardiovascular:  Negative for chest pain.   Gastrointestinal:  Positive for abdominal pain. Negative for diarrhea, nausea and vomiting.   Skin:  Positive for wound.   Neurological:  Negative for dizziness  and weakness.     Objective:     Temp:  [99.4 °F (37.4 °C)] 99.4 °F (37.4 °C)  Pulse:  [79-90] 85  Resp:  [13-18] 17  SpO2:  [93 %-98 %] 93 %  BP: (178-200)/() 182/85     Body mass index is 25.54 kg/m².           Drains       Drain  Duration                  Hemodialysis AV Fistula Right forearm -- days                    Physical Exam  Vitals reviewed.   Constitutional:       General: He is not in acute distress.     Appearance: He is well-developed. He is not diaphoretic.   HENT:      Head: Normocephalic and atraumatic.   Eyes:      General: No scleral icterus.  Cardiovascular:      Rate and Rhythm: Normal rate.   Pulmonary:      Effort: Pulmonary effort is normal. No respiratory distress.      Breath sounds: No stridor.   Abdominal:      General: There is no distension.      Palpations: Abdomen is soft.      Tenderness: There is no abdominal tenderness.      Comments: Abdomen soft, non distended, no peritoneal signs  The midline incision is actively draining purulent, sanguineous fluid with surrounding induration and fluctuance   Musculoskeletal:         General: Normal range of motion.      Cervical back: Normal range of motion.   Skin:     General: Skin is warm and dry.   Neurological:      Mental Status: He is alert.   Psychiatric:         Behavior: Behavior normal.       Significant Labs:    BMP:  Recent Labs   Lab 01/08/23 0310 01/09/23 0446 01/14/23  1745   * 130* 139   K 4.5 5.4* 4.0   CL 95 94* 101   CO2 19* 21* 25   BUN 55* 77* 37*   CREATININE 11.4* 14.4* 9.8*   CALCIUM 9.3 8.2* 9.9       CBC:  Recent Labs   Lab 01/08/23 0310 01/09/23  0446 01/14/23  1745   WBC 9.06 11.02 19.83*   HGB 7.6* 7.2* 7.7*   HCT 24.9* 23.4* 25.6*    302 448       Blood Culture: No results for input(s): LABBLOO in the last 168 hours.  Urine Culture: No results for input(s): LABURIN in the last 168 hours.  Urine Studies: No results for input(s): COLORU, APPEARANCEUA, PHUR, SPECGRAV, PROTEINUA, GLUCUA,  KETONESU, BILIRUBINUA, OCCULTUA, NITRITE, UROBILINOGEN, LEUKOCYTESUR, RBCUA, WBCUA, BACTERIA, SQUAMEPITHEL, HYALINECASTS in the last 168 hours.    Invalid input(s): MIGUEL    Significant Imaging:  Findings as above                      Assessment and Plan:     S/p nephrectomy  Haroldo Dickinson is a 48 yo M with a history of ESRD s/p bilateral nephrectomies now HD dependent three times weekly, HTN, HLD, DM, CAD, presenting for abdominal wall drainage. Urology was consulted for post op SSI.    - will admit to urology service  - bedside I&D of anterior abdominal wall abscess  - fluid collected for culture  - broad spectrum antibiotics, will tailor to cultures  - will consult HM for comanagement of medical comorbidities and assistance with possible pna  - diabetic diet          VTE Risk Mitigation (From admission, onward)      None          Fili Marcelino MD  Urology  [facility]

## 2023-01-16 VITALS
OXYGEN SATURATION: 97 % | DIASTOLIC BLOOD PRESSURE: 87 MMHG | WEIGHT: 168 LBS | BODY MASS INDEX: 25.54 KG/M2 | RESPIRATION RATE: 16 BRPM | TEMPERATURE: 97 F | SYSTOLIC BLOOD PRESSURE: 170 MMHG | HEART RATE: 72 BPM

## 2023-01-16 LAB
ALBUMIN SERPL BCP-MCNC: 2.5 G/DL (ref 3.5–5.2)
ALP SERPL-CCNC: 77 U/L (ref 55–135)
ALT SERPL W/O P-5'-P-CCNC: <5 U/L (ref 10–44)
ANION GAP SERPL CALC-SCNC: 14 MMOL/L (ref 8–16)
AST SERPL-CCNC: 9 U/L (ref 10–40)
BASOPHILS # BLD AUTO: 0.11 K/UL (ref 0–0.2)
BASOPHILS NFR BLD: 0.7 % (ref 0–1.9)
BILIRUB SERPL-MCNC: 0.4 MG/DL (ref 0.1–1)
BUN SERPL-MCNC: 52 MG/DL (ref 6–20)
CALCIUM SERPL-MCNC: 8.7 MG/DL (ref 8.7–10.5)
CHLORIDE SERPL-SCNC: 97 MMOL/L (ref 95–110)
CO2 SERPL-SCNC: 24 MMOL/L (ref 23–29)
CREAT SERPL-MCNC: 14.4 MG/DL (ref 0.5–1.4)
DIFFERENTIAL METHOD: ABNORMAL
EOSINOPHIL # BLD AUTO: 0.8 K/UL (ref 0–0.5)
EOSINOPHIL NFR BLD: 5.1 % (ref 0–8)
ERYTHROCYTE [DISTWIDTH] IN BLOOD BY AUTOMATED COUNT: 14.7 % (ref 11.5–14.5)
EST. GFR  (NO RACE VARIABLE): 3.9 ML/MIN/1.73 M^2
GLUCOSE SERPL-MCNC: 81 MG/DL (ref 70–110)
HCT VFR BLD AUTO: 23.6 % (ref 40–54)
HCT VFR BLD AUTO: 27.3 % (ref 40–54)
HGB BLD-MCNC: 7 G/DL (ref 14–18)
HGB BLD-MCNC: 8.4 G/DL (ref 14–18)
IMM GRANULOCYTES # BLD AUTO: 0.33 K/UL (ref 0–0.04)
IMM GRANULOCYTES NFR BLD AUTO: 2.2 % (ref 0–0.5)
LYMPHOCYTES # BLD AUTO: 2.8 K/UL (ref 1–4.8)
LYMPHOCYTES NFR BLD: 18.1 % (ref 18–48)
MAGNESIUM SERPL-MCNC: 2.3 MG/DL (ref 1.6–2.6)
MCH RBC QN AUTO: 30 PG (ref 27–31)
MCHC RBC AUTO-ENTMCNC: 29.7 G/DL (ref 32–36)
MCV RBC AUTO: 101 FL (ref 82–98)
MONOCYTES # BLD AUTO: 1 K/UL (ref 0.3–1)
MONOCYTES NFR BLD: 6.8 % (ref 4–15)
NEUTROPHILS # BLD AUTO: 10.2 K/UL (ref 1.8–7.7)
NEUTROPHILS NFR BLD: 67.1 % (ref 38–73)
NRBC BLD-RTO: 0 /100 WBC
PHOSPHATE SERPL-MCNC: 5.2 MG/DL (ref 2.7–4.5)
PLATELET # BLD AUTO: 492 K/UL (ref 150–450)
PMV BLD AUTO: 9.3 FL (ref 9.2–12.9)
POCT GLUCOSE: 105 MG/DL (ref 70–110)
POTASSIUM SERPL-SCNC: 3.9 MMOL/L (ref 3.5–5.1)
PROT SERPL-MCNC: 7.1 G/DL (ref 6–8.4)
RBC # BLD AUTO: 2.33 M/UL (ref 4.6–6.2)
SODIUM SERPL-SCNC: 135 MMOL/L (ref 136–145)
WBC # BLD AUTO: 15.23 K/UL (ref 3.9–12.7)

## 2023-01-16 PROCEDURE — 36415 COLL VENOUS BLD VENIPUNCTURE: CPT | Mod: NTX | Performed by: STUDENT IN AN ORGANIZED HEALTH CARE EDUCATION/TRAINING PROGRAM

## 2023-01-16 PROCEDURE — 80053 COMPREHEN METABOLIC PANEL: CPT | Mod: NTX | Performed by: STUDENT IN AN ORGANIZED HEALTH CARE EDUCATION/TRAINING PROGRAM

## 2023-01-16 PROCEDURE — 25000003 PHARM REV CODE 250: Mod: NTX | Performed by: STUDENT IN AN ORGANIZED HEALTH CARE EDUCATION/TRAINING PROGRAM

## 2023-01-16 PROCEDURE — 85025 COMPLETE CBC W/AUTO DIFF WBC: CPT | Mod: NTX | Performed by: STUDENT IN AN ORGANIZED HEALTH CARE EDUCATION/TRAINING PROGRAM

## 2023-01-16 PROCEDURE — 85014 HEMATOCRIT: CPT | Mod: NTX

## 2023-01-16 PROCEDURE — 83735 ASSAY OF MAGNESIUM: CPT | Mod: NTX | Performed by: STUDENT IN AN ORGANIZED HEALTH CARE EDUCATION/TRAINING PROGRAM

## 2023-01-16 PROCEDURE — 36415 COLL VENOUS BLD VENIPUNCTURE: CPT | Mod: NTX

## 2023-01-16 PROCEDURE — 84100 ASSAY OF PHOSPHORUS: CPT | Mod: NTX | Performed by: STUDENT IN AN ORGANIZED HEALTH CARE EDUCATION/TRAINING PROGRAM

## 2023-01-16 PROCEDURE — 85018 HEMOGLOBIN: CPT | Mod: NTX

## 2023-01-16 PROCEDURE — 63600175 PHARM REV CODE 636 W HCPCS: Mod: NTX | Performed by: STUDENT IN AN ORGANIZED HEALTH CARE EDUCATION/TRAINING PROGRAM

## 2023-01-16 PROCEDURE — 80100016 HC MAINTENANCE HEMODIALYSIS: Mod: NTX

## 2023-01-16 PROCEDURE — 90935 HEMODIALYSIS ONE EVALUATION: CPT | Mod: NTX,,, | Performed by: HOSPITALIST

## 2023-01-16 PROCEDURE — 90935 PR HEMODIALYSIS, ONE EVALUATION: ICD-10-PCS | Mod: NTX,,, | Performed by: HOSPITALIST

## 2023-01-16 RX ORDER — SODIUM CHLORIDE 9 MG/ML
INJECTION, SOLUTION INTRAVENOUS
Status: DISCONTINUED | OUTPATIENT
Start: 2023-01-16 | End: 2023-01-16 | Stop reason: HOSPADM

## 2023-01-16 RX ORDER — SODIUM CHLORIDE 9 MG/ML
INJECTION, SOLUTION INTRAVENOUS ONCE
Status: COMPLETED | OUTPATIENT
Start: 2023-01-16 | End: 2023-01-16

## 2023-01-16 RX ADMIN — HYDRALAZINE HYDROCHLORIDE 25 MG: 25 TABLET, FILM COATED ORAL at 01:01

## 2023-01-16 RX ADMIN — ACETAMINOPHEN 1000 MG: 500 TABLET ORAL at 12:01

## 2023-01-16 RX ADMIN — HEPARIN SODIUM 5000 UNITS: 5000 INJECTION INTRAVENOUS; SUBCUTANEOUS at 01:01

## 2023-01-16 RX ADMIN — SODIUM CHLORIDE: 9 INJECTION, SOLUTION INTRAVENOUS at 08:01

## 2023-01-16 RX ADMIN — SEVELAMER CARBONATE 800 MG: 800 TABLET, FILM COATED ORAL at 12:01

## 2023-01-16 RX ADMIN — DOXYCYCLINE HYCLATE 100 MG: 100 TABLET, COATED ORAL at 12:01

## 2023-01-16 RX ADMIN — POLYETHYLENE GLYCOL 3350 17 G: 17 POWDER, FOR SOLUTION ORAL at 12:01

## 2023-01-16 RX ADMIN — OXYCODONE HYDROCHLORIDE 10 MG: 10 TABLET ORAL at 03:01

## 2023-01-16 RX ADMIN — HYDRALAZINE HYDROCHLORIDE 25 MG: 25 TABLET, FILM COATED ORAL at 05:01

## 2023-01-16 RX ADMIN — MUPIROCIN: 20 OINTMENT TOPICAL at 09:01

## 2023-01-16 RX ADMIN — OXYCODONE HYDROCHLORIDE 15 MG: 10 TABLET ORAL at 08:01

## 2023-01-16 RX ADMIN — CINACALCET 90 MG: 30 TABLET, FILM COATED ORAL at 12:01

## 2023-01-16 RX ADMIN — ASPIRIN 81 MG: 81 TABLET, COATED ORAL at 12:01

## 2023-01-16 RX ADMIN — METHOCARBAMOL 500 MG: 500 TABLET ORAL at 12:01

## 2023-01-16 RX ADMIN — CARVEDILOL 25 MG: 25 TABLET, FILM COATED ORAL at 12:01

## 2023-01-16 RX ADMIN — AMLODIPINE BESYLATE 10 MG: 10 TABLET ORAL at 12:01

## 2023-01-16 RX ADMIN — ACETAMINOPHEN 1000 MG: 500 TABLET ORAL at 05:01

## 2023-01-16 RX ADMIN — ERYTHROPOIETIN 7700 UNITS: 10000 INJECTION, SOLUTION INTRAVENOUS; SUBCUTANEOUS at 09:01

## 2023-01-16 RX ADMIN — HEPARIN SODIUM 5000 UNITS: 5000 INJECTION INTRAVENOUS; SUBCUTANEOUS at 05:01

## 2023-01-16 NOTE — PLAN OF CARE
Problem: Electrolyte Imbalance (Chronic Kidney Disease)  Goal: Electrolyte Balance  Outcome: Ongoing, Progressing     Problem: Fluid Volume Excess (Chronic Kidney Disease)  Goal: Fluid Balance  Outcome: Ongoing, Progressing   Discussed today's tx plan with pt regarding electrolyte balance and fluid management. Pt responded appropriately.

## 2023-01-16 NOTE — NURSING
Pt received discharge instructions. Education provided on home wound care. Verbalized understanding of all instructions. Prescriptions received and PIV removed. Pt ambulating without difficulty and tolerating prescribed diet. Awaiting wheelchair for transport.

## 2023-01-16 NOTE — HPI
47 y.o. Black or  Male with multiple co morbidities including CAD and ESRD 2/2 HTN on HD and dialyzes MWF who presents to [facility] for wound dehiscence. Urology on board. Last HD session was on 01/13/2023.  Nephrology consulted for management of ESRD and HD treatment.

## 2023-01-16 NOTE — CONSULTS
[facility]  Nephrology  Consult Note    Patient Name: Haroldo Dickinson  MRN: [id number]  Admission Date: 1/14/2023  Hospital Length of Stay: 2 days  Attending Provider: Charly Cote Jr., MD   Primary Care Physician: Tess Ledbetter MD  Principal Problem:S/p nephrectomy    Consults  Subjective:     HPI: 47 y.o. Black or  Male with multiple co morbidities including CAD and ESRD 2/2 HTN on HD and dialyzes MWF who presents to [facility] for wound dehiscence. Urology on board. Last HD session was on 01/13/2023.  Nephrology consulted for management of ESRD and HD treatment.       Past Medical History:   Diagnosis Date    CAD (coronary artery disease), native coronary artery 11/21/2019    Cataract     Diabetes mellitus     Diabetic retinopathy     Dialysis patient     DM type 2 causing renal disease, not at goal     ESRD (end stage renal disease) started dialysis 01/2014 06/05/2014    Hyperparathyroidism, secondary renal 06/05/2014    Hypertension     NSTEMI (non-ST elevated myocardial infarction) 12/21/2013    Organ transplant candidate 06/05/2014    Pneumonia     Renal cell carcinoma of right kidney     Renal hypertension     Stroke        Past Surgical History:   Procedure Laterality Date    ANGIOGRAM, CORONARY, WITH LEFT HEART CATHETERIZATION N/A 7/1/2021    Procedure: Angiogram, Coronary, with Left Heart Cath;  Surgeon: Miki Zendejas MD;  Location: [facility] CATH LAB;  Service: Cardiology;  Laterality: N/A;    COLONOSCOPY N/A 5/3/2017    Procedure: COLONOSCOPY;  Surgeon: Rufino Carpenter MD;  Location: [address]);  Service: Endoscopy;  Laterality: N/A;  pt states can only schedule on Wednesdays    COLONOSCOPY N/A 2/9/2021    Procedure: COLONOSCOPY;  Surgeon: Edil Wong MD;  Location: [facility] ENDO [address]);  Service: Endoscopy;  Laterality: N/A;  Dialysis MWF/ labwork day of procedure  right arm aceess  per Dr. Colin pt can hold Plavix 5 days prior see note- sm  COVID test on 2/6/21 at  [facility] - sm    COLONOSCOPY N/A 2/10/2021    Procedure: COLONOSCOPY;  Surgeon: Robert Ayers MD;  Location: [facility] ENDO (4TH FLR);  Service: Endoscopy;  Laterality: N/A;  rescheduled due to poor bowel prep-BB  negative covid screening 2/6/21-BB  dialysis M-W-F-BB  okay to r/s for 2/9/21 and to hold Plavix per Dr. KIRAN Wong-BB  labs same day-BB    CORONARY STENT PLACEMENT N/A 7/25/2019    Procedure: INSERTION, STENT, CORONARY ARTERY;  Surgeon: Miki Zendejas MD;  Location: [facility] CATH LAB;  Service: Cardiology;  Laterality: N/A;    DIALYSIS FISTULA CREATION      FISTULOGRAM N/A 2/18/2019    Procedure: Fistulogram;  Surgeon: Yaneth De La Cruz MD;  Location: [facility] CATH LAB;  Service: Cardiology;  Laterality: N/A;    FISTULOGRAM Right 7/23/2019    Procedure: Fistulogram;  Surgeon: Oz Cordoba MD;  Location: [facility] CATH LAB;  Service: Cardiology;  Laterality: Right;    LAPAROSCOPIC ROBOT-ASSISTED SURGICAL REMOVAL OF KIDNEY USING DA JAIME XI Right 5/19/2022    Procedure: XI ROBOTIC NEPHRECTOMY;  Surgeon: Aidan Hendricks MD;  Location: [facility] OR 2ND FLR;  Service: Urology;  Laterality: Right;  3hrs    LAPAROSCOPIC ROBOT-ASSISTED SURGICAL REMOVAL OF KIDNEY USING DA JAIME XI Left 1/5/2023    Procedure: XI ROBOTIC NEPHRECTOMY;  Surgeon: Aidan Hendricks MD;  Location: [facility] OR 2ND FLR;  Service: Urology;  Laterality: Left;  4 hrs    LEFT HEART CATHETERIZATION Left 7/25/2019    Procedure: Left heart cath;  Surgeon: Miki Zendejas MD;  Location: [facility] CATH LAB;  Service: Cardiology;  Laterality: Left;    RETINAL LASER PROCEDURE Bilateral 2018 or 2017    Dr. Rothman    VASCULAR SURGERY         Review of patient's allergies indicates:   Allergen Reactions    No known allergies      Current Facility-Administered Medications   Medication Frequency    0.9%  NaCl infusion PRN    acetaminophen tablet 1,000 mg Q6H    acetaminophen tablet 650 mg Q8H PRN    amLODIPine tablet 10 mg Daily    aspirin EC tablet 81  mg Daily    carvediloL tablet 25 mg BID WM    cinacalcet tablet 90 mg Daily    doxycycline tablet 100 mg Q12H    epoetin philip injection 7,700 Units Every Mon, Wed, Fri    heparin (porcine) injection 5,000 Units Q8H    hydrALAZINE tablet 25 mg Q8H    LIDOcaine (PF) 10 mg/ml (1%) injection 10 mg Once PRN    melatonin tablet 6 mg Nightly PRN    methocarbamoL tablet 500 mg QID    mupirocin 2 % ointment BID    ondansetron disintegrating tablet 4 mg Q6H PRN    oxyCODONE immediate release tablet 15 mg Q4H PRN    oxyCODONE immediate release tablet Tab 10 mg Q4H PRN    polyethylene glycol packet 17 g Daily    promethazine tablet 12.5 mg Q6H PRN    sevelamer carbonate tablet 800 mg TID WM    sodium chloride 0.9% bolus 250 mL 250 mL PRN    sodium chloride 0.9% flush 10 mL PRN     Facility-Administered Medications Ordered in Other Encounters   Medication Frequency    lidocaine (PF) 10 mg/ml (1%) injection 10 mg Once     Family History       Problem Relation (Age of Onset)    Cancer Maternal Grandfather    Cataracts Mother    Diabetes Mother    Heart disease Brother    Hypertension Mother, Father, Sister, Brother    Kidney disease Brother          Tobacco Use    Smoking status: Never    Smokeless tobacco: Never   Substance and Sexual Activity    Alcohol use: Not Currently     Comment: One drink a month    Drug use: No    Sexual activity: Yes     Partners: Female     Birth control/protection: None     Review of Systems   All other systems reviewed and are negative.  Objective:     Vital Signs (Most Recent):  Temp: 96.7 °F (35.9 °C) (01/16/23 1203)  Pulse: 72 (01/16/23 1203)  Resp: 17 (01/16/23 1203)  BP: (!) 170/87 (01/16/23 1300)  SpO2: 97 % (01/16/23 1203)   Vital Signs (24h Range):  Temp:  [96.7 °F (35.9 °C)-98.1 °F (36.7 °C)] 96.7 °F (35.9 °C)  Pulse:  [60-80] 72  Resp:  [16-17] 17  SpO2:  [93 %-97 %] 97 %  BP: (126-201)/(60-93) 170/87     Weight: 76.2 kg (168 lb) (01/14/23 1653)  Body mass index is  25.54 kg/m².  Body surface area is 1.91 meters squared.    I/O last 3 completed shifts:  In: 360 [P.O.:360]  Out: 0     Physical Exam  Constitutional:       Appearance: Normal appearance.   HENT:      Head: Normocephalic.      Right Ear: Tympanic membrane normal.      Mouth/Throat:      Mouth: Mucous membranes are moist.   Eyes:      Pupils: Pupils are equal, round, and reactive to light.   Cardiovascular:      Rate and Rhythm: Normal rate.   Pulmonary:      Effort: Pulmonary effort is normal.   Abdominal:      General: Abdomen is flat.   Musculoskeletal:         General: Normal range of motion.   Skin:     General: Skin is warm.   Neurological:      General: No focal deficit present.      Mental Status: He is alert.       Significant Labs:  All labs within the past 24 hours have been reviewed.    Significant Imaging:  Labs: Reviewed    Assessment/Plan:     End stage renal failure on dialysis  Outpatient HD Information:  -Dialysis modality: Hemodialysis  -Outpatient HD unit: [facility]  -Nephrologist: ?  -HD TX days: Monday/Wednesday/Friday, duration of treatment: 4 hrs   -Dialysis access: RUE AV fistula   -Residual urine: Anuric   -EDW: 74.2 kg     ESRD 2/2 to hypertension.     Plan/Recommendations:   -iHD today (01/16/2023) with UF -3L as BP tolerates for metabolic clearance and volume management   -Electrolytes: acceptable,   -Mineral & Bone Disorder:  Phosphorus  5.2, on sensipar and renvela  -ESKD anemia: hgb 7, goal hgb 10-11g/dL; obtan iron studies, transfuse if hgb <7  -Renal function panel on HD days   -Renal diet with protein supplements, if not NPO   -Strict I/O's and daily weights            Thank you for your consult. I will follow-up with patient. Please contact us if you have any additional questions.     Case discussed with attending. Attestation to follow.       Harry Houston,   Nephrology  [facility] - Surgery

## 2023-01-16 NOTE — SUBJECTIVE & OBJECTIVE
Past Medical History:   Diagnosis Date    CAD (coronary artery disease), native coronary artery 11/21/2019    Cataract     Diabetes mellitus     Diabetic retinopathy     Dialysis patient     DM type 2 causing renal disease, not at goal     ESRD (end stage renal disease) started dialysis 01/2014 06/05/2014    Hyperparathyroidism, secondary renal 06/05/2014    Hypertension     NSTEMI (non-ST elevated myocardial infarction) 12/21/2013    Organ transplant candidate 06/05/2014    Pneumonia     Renal cell carcinoma of right kidney     Renal hypertension     Stroke        Past Surgical History:   Procedure Laterality Date    ANGIOGRAM, CORONARY, WITH LEFT HEART CATHETERIZATION N/A 7/1/2021    Procedure: Angiogram, Coronary, with Left Heart Cath;  Surgeon: Miki Zendejas MD;  Location: [facility] CATH LAB;  Service: Cardiology;  Laterality: N/A;    COLONOSCOPY N/A 5/3/2017    Procedure: COLONOSCOPY;  Surgeon: Rufino Carpenter MD;  Location: [facility] ([address] [place] [address]);  Service: Endoscopy;  Laterality: N/A;  pt states can only schedule on Wednesdays    COLONOSCOPY N/A 2/9/2021    Procedure: COLONOSCOPY;  Surgeon: Edil Wong MD;  [facility]  Service: Endoscopy;  Laterality: N/A;  Dialysis MWF/ labwork day of procedure  right arm aceess  per Dr. Colin pt can hold Plavix 5 days prior see note- sm  COVID test on 2/6/21 at [facility] - sm    COLONOSCOPY N/A 2/10/2021    Procedure: COLONOSCOPY;  Surgeon: Robert Ayers MD;  [facility]  Service: Endoscopy;  Laterality: N/A;  rescheduled due to poor bowel prep-BB  negative covid screening 2/6/21-BB  dialysis M-W-F-BB  okay to r/s for 2/9/21 and to hold Plavix per Dr. KIRAN Wong-BB  labs same day-BB    CORONARY STENT PLACEMENT N/A 7/25/2019    Procedure: INSERTION, STENT, CORONARY ARTERY;  Surgeon: Miki Zendejas MD;  Location: [facility] CATH LAB;  Service: Cardiology;  Laterality: N/A;    DIALYSIS FISTULA CREATION      FISTULOGRAM N/A 2/18/2019    Procedure:  Fistulogram;  Surgeon: Yaneth De La Cruz MD;  Location: [facility] CATH LAB;  Service: Cardiology;  Laterality: N/A;    FISTULOGRAM Right 7/23/2019    Procedure: Fistulogram;  Surgeon: Oz Cordoba MD;  Location: [facility] CATH LAB;  Service: Cardiology;  Laterality: Right;    LAPAROSCOPIC ROBOT-ASSISTED SURGICAL REMOVAL OF KIDNEY USING DA JAIME XI Right 5/19/2022    Procedure: XI ROBOTIC NEPHRECTOMY;  Surgeon: Aidan Hendricks MD;  Location: [facility] OR [facility] FLR;  Service: Urology;  Laterality: Right;  3hrs    LAPAROSCOPIC ROBOT-ASSISTED SURGICAL REMOVAL OF KIDNEY USING DA JAIME XI Left 1/5/2023    Procedure: XI ROBOTIC NEPHRECTOMY;  Surgeon: Aidan Hendricks MD;  [facility];  Service: Urology;  Laterality: Left;  4 hrs    LEFT HEART CATHETERIZATION Left 7/25/2019    Procedure: Left heart cath;  Surgeon: Miki Zendejas MD;  Location: [facility] CATH LAB;  Service: Cardiology;  Laterality: Left;    RETINAL LASER PROCEDURE Bilateral 2018 or 2017    Dr. Rothman    VASCULAR SURGERY         Review of patient's allergies indicates:   Allergen Reactions    No known allergies      Current Facility-Administered Medications   Medication Frequency    0.9%  NaCl infusion PRN    acetaminophen tablet 1,000 mg Q6H    acetaminophen tablet 650 mg Q8H PRN    amLODIPine tablet 10 mg Daily    aspirin EC tablet 81 mg Daily    carvediloL tablet 25 mg BID WM    cinacalcet tablet 90 mg Daily    doxycycline tablet 100 mg Q12H    epoetin philip injection 7,700 Units Every Mon, Wed, Fri    heparin (porcine) injection 5,000 Units Q8H    hydrALAZINE tablet 25 mg Q8H    LIDOcaine (PF) 10 mg/ml (1%) injection 10 mg Once PRN    melatonin tablet 6 mg Nightly PRN    methocarbamoL tablet 500 mg QID    mupirocin 2 % ointment BID    ondansetron disintegrating tablet 4 mg Q6H PRN    oxyCODONE immediate release tablet 15 mg Q4H PRN    oxyCODONE immediate release tablet Tab 10 mg Q4H PRN    polyethylene glycol packet 17 g Daily    promethazine  tablet 12.5 mg Q6H PRN    sevelamer carbonate tablet 800 mg TID WM    sodium chloride 0.9% bolus 250 mL 250 mL PRN    sodium chloride 0.9% flush 10 mL PRN     Facility-Administered Medications Ordered in Other Encounters   Medication Frequency    lidocaine (PF) 10 mg/ml (1%) injection 10 mg Once     Family History       Problem Relation (Age of Onset)    Cancer Maternal Grandfather    Cataracts Mother    Diabetes Mother    Heart disease Brother    Hypertension Mother, Father, Sister, Brother    Kidney disease Brother          Tobacco Use    Smoking status: Never    Smokeless tobacco: Never   Substance and Sexual Activity    Alcohol use: Not Currently     Comment: One drink a month    Drug use: No    Sexual activity: Yes     Partners: Female     Birth control/protection: None     Review of Systems   All other systems reviewed and are negative.  Objective:     Vital Signs (Most Recent):  Temp: 96.7 °F (35.9 °C) (01/16/23 1203)  Pulse: 72 (01/16/23 1203)  Resp: 17 (01/16/23 1203)  BP: (!) 170/87 (01/16/23 1300)  SpO2: 97 % (01/16/23 1203)   Vital Signs (24h Range):  Temp:  [96.7 °F (35.9 °C)-98.1 °F (36.7 °C)] 96.7 °F (35.9 °C)  Pulse:  [60-80] 72  Resp:  [16-17] 17  SpO2:  [93 %-97 %] 97 %  BP: (126-201)/(60-93) 170/87     Weight: 76.2 kg (168 lb) (01/14/23 1657)  Body mass index is 25.54 kg/m².  Body surface area is 1.91 meters squared.    I/O last 3 completed shifts:  In: 360 [P.O.:360]  Out: 0     Physical Exam  Constitutional:       Appearance: Normal appearance.   HENT:      Head: Normocephalic.      Right Ear: Tympanic membrane normal.      Mouth/Throat:      Mouth: Mucous membranes are moist.   Eyes:      Pupils: Pupils are equal, round, and reactive to light.   Cardiovascular:      Rate and Rhythm: Normal rate.   Pulmonary:      Effort: Pulmonary effort is normal.   Abdominal:      General: Abdomen is flat.   Musculoskeletal:         General: Normal range of motion.   Skin:     General: Skin is warm.    Neurological:      General: No focal deficit present.      Mental Status: He is alert.       Significant Labs:  All labs within the past 24 hours have been reviewed.    Significant Imaging:  Labs: Reviewed

## 2023-01-16 NOTE — ASSESSMENT & PLAN NOTE
Outpatient HD Information:  -Dialysis modality: Hemodialysis  -Outpatient HD unit: [facility]  -Nephrologist: ?  -HD TX days: Monday/Wednesday/Friday, duration of treatment: 4 hrs   -Dialysis access: RUE AV fistula   -Residual urine: Anuric   -EDW: 74.2 kg     ESRD 2/2 to hypertension.     Plan/Recommendations:   -iHD today (01/16/2023) with UF -3L as BP tolerates for metabolic clearance and volume management   -Electrolytes: acceptable,   -Mineral & Bone Disorder:  Phosphorus  5.2, on sensipar and renvela  -ESKD anemia: hgb 7, goal hgb 10-11g/dL; obtan iron studies, transfuse if hgb <7  -Renal function panel on HD days   -Renal diet with protein supplements, if not NPO   -Strict I/O's and daily weights

## 2023-01-16 NOTE — NURSING
Pt completed HD. Tolerated well. VSS. NAD. Net 2 liters fluid removed. Administered ordered dose of epoetin as well as 15mg oxycodone for pt's c/o pain. Blood returned. Lines disconnected and flushed with NS. Needles pulled and manual pressure held until hemostasis achieved.    1123- blood glucose 105.    1142- report given to PORTIA Polo.    1156- pt off unit via wheelchair with transporter back to inpatient room.

## 2023-01-18 LAB — BACTERIA SPEC AEROBE CULT: NO GROWTH

## 2023-01-19 LAB
BACTERIA BLD CULT: NORMAL
BACTERIA BLD CULT: NORMAL

## 2023-01-20 LAB — BACTERIA SPEC ANAEROBE CULT: ABNORMAL

## 2023-01-23 ENCOUNTER — TELEPHONE (OUTPATIENT)
Dept: UROLOGY | Facility: HOSPITAL | Age: 48
End: 2023-01-23
Payer: MEDICARE

## 2023-01-23 RX ORDER — METRONIDAZOLE 500 MG/1
500 TABLET ORAL EVERY 8 HOURS
Qty: 21 TABLET | Refills: 0 | Status: SHIPPED | OUTPATIENT
Start: 2023-01-23 | End: 2023-01-30

## 2023-01-23 NOTE — TELEPHONE ENCOUNTER
Called patient to inform him that culture from his abdominal abscess was growing Bacteroides.  Ordered prescription for 7 days of flagyl to his [facility] on file.  Patient verbalized understanding.

## 2023-01-26 LAB
FINAL PATHOLOGIC DIAGNOSIS: NORMAL
GROSS: NORMAL
Lab: NORMAL
SUPPLEMENTAL DIAGNOSIS: NORMAL

## 2023-01-27 ENCOUNTER — TELEPHONE (OUTPATIENT)
Dept: UROLOGY | Facility: CLINIC | Age: 48
End: 2023-01-27
Payer: MEDICARE

## 2023-01-27 NOTE — TELEPHONE ENCOUNTER
I SWP [employer] & rescheduled PO appt per below.  Pt VU.     2/14/2023 Status: Gianluca   Appt Time: 9:45 AM Length: 15   Visit Type: POST-OP        ----- Message from Aidan Hendricks MD sent at 1/27/2023  8:24 AM CST -----  Pathology being sent to [facility] for further evaluation. May be best to push back post-op appointment (if he is doing well) so we have the results.  It will not likely result from [facility] by 1/31.

## 2023-01-30 ENCOUNTER — EXTERNAL HOME HEALTH (OUTPATIENT)
Dept: HOME HEALTH SERVICES | Facility: HOSPITAL | Age: 48
End: 2023-01-30
Payer: MEDICARE

## 2023-01-30 NOTE — TELEPHONE ENCOUNTER
Spoke to Ms. Stockton, Caregiver. I informed her that we had to cancel today's appointments due to needing insurance authorization. She was just leaving her house but stated it is no problem. We will reschedule once insurance is received.    5

## 2023-02-06 ENCOUNTER — HOSPITAL ENCOUNTER (EMERGENCY)
Facility: HOSPITAL | Age: 48
Discharge: HOME | End: 2023-02-06
Attending: EMERGENCY MEDICINE
Payer: MEDICARE

## 2023-02-06 VITALS
RESPIRATION RATE: 20 BRPM | DIASTOLIC BLOOD PRESSURE: 91 MMHG | TEMPERATURE: 98 F | WEIGHT: 168 LBS | BODY MASS INDEX: 25.54 KG/M2 | OXYGEN SATURATION: 99 % | HEART RATE: 80 BPM | SYSTOLIC BLOOD PRESSURE: 180 MMHG

## 2023-02-06 DIAGNOSIS — J06.9 VIRAL URI WITH COUGH: Primary | ICD-10-CM

## 2023-02-06 DIAGNOSIS — R68.83 CHILLS: ICD-10-CM

## 2023-02-06 LAB
ALBUMIN SERPL BCP-MCNC: 2.9 G/DL (ref 3.5–5.2)
ALP SERPL-CCNC: 75 U/L (ref 55–135)
ALT SERPL W/O P-5'-P-CCNC: 5 U/L (ref 10–44)
ANION GAP SERPL CALC-SCNC: 14 MMOL/L (ref 8–16)
AST SERPL-CCNC: 15 U/L (ref 10–40)
BASOPHILS # BLD AUTO: 0.09 K/UL (ref 0–0.2)
BASOPHILS NFR BLD: 0.9 % (ref 0–1.9)
BILIRUB SERPL-MCNC: 1.5 MG/DL (ref 0.1–1)
BUN SERPL-MCNC: 28 MG/DL (ref 6–20)
CALCIUM SERPL-MCNC: 9.6 MG/DL (ref 8.7–10.5)
CHLORIDE SERPL-SCNC: 100 MMOL/L (ref 95–110)
CO2 SERPL-SCNC: 23 MMOL/L (ref 23–29)
CREAT SERPL-MCNC: 9.1 MG/DL (ref 0.5–1.4)
DIFFERENTIAL METHOD: ABNORMAL
EOSINOPHIL # BLD AUTO: 0.2 K/UL (ref 0–0.5)
EOSINOPHIL NFR BLD: 1.7 % (ref 0–8)
ERYTHROCYTE [DISTWIDTH] IN BLOOD BY AUTOMATED COUNT: 15.5 % (ref 11.5–14.5)
EST. GFR  (NO RACE VARIABLE): 6.7 ML/MIN/1.73 M^2
GLUCOSE SERPL-MCNC: 70 MG/DL (ref 70–110)
HCT VFR BLD AUTO: 30.8 % (ref 40–54)
HGB BLD-MCNC: 9.5 G/DL (ref 14–18)
IMM GRANULOCYTES # BLD AUTO: 0.04 K/UL (ref 0–0.04)
IMM GRANULOCYTES NFR BLD AUTO: 0.4 % (ref 0–0.5)
INFLUENZA A, MOLECULAR: NOT DETECTED
INFLUENZA B, MOLECULAR: NOT DETECTED
LYMPHOCYTES # BLD AUTO: 1.1 K/UL (ref 1–4.8)
LYMPHOCYTES NFR BLD: 11.2 % (ref 18–48)
MCH RBC QN AUTO: 30.8 PG (ref 27–31)
MCHC RBC AUTO-ENTMCNC: 30.8 G/DL (ref 32–36)
MCV RBC AUTO: 100 FL (ref 82–98)
MONOCYTES # BLD AUTO: 1 K/UL (ref 0.3–1)
MONOCYTES NFR BLD: 10.4 % (ref 4–15)
NEUTROPHILS # BLD AUTO: 7.2 K/UL (ref 1.8–7.7)
NEUTROPHILS NFR BLD: 75.4 % (ref 38–73)
NRBC BLD-RTO: 0 /100 WBC
PLATELET # BLD AUTO: 316 K/UL (ref 150–450)
PMV BLD AUTO: 9.8 FL (ref 9.2–12.9)
POTASSIUM SERPL-SCNC: 4 MMOL/L (ref 3.5–5.1)
PROT SERPL-MCNC: 7.7 G/DL (ref 6–8.4)
RBC # BLD AUTO: 3.08 M/UL (ref 4.6–6.2)
RSV AG BY MOLECULAR METHOD: NOT DETECTED
SARS-COV-2 RNA RESP QL NAA+PROBE: NOT DETECTED
SODIUM SERPL-SCNC: 137 MMOL/L (ref 136–145)
WBC # BLD AUTO: 9.59 K/UL (ref 3.9–12.7)

## 2023-02-06 PROCEDURE — 99283 EMERGENCY DEPT VISIT LOW MDM: CPT | Mod: NTX,,, | Performed by: EMERGENCY MEDICINE

## 2023-02-06 PROCEDURE — 0241U SARS-COV2 (COVID) WITH FLU/RSV BY PCR: CPT | Mod: NTX | Performed by: EMERGENCY MEDICINE

## 2023-02-06 PROCEDURE — 85025 COMPLETE CBC W/AUTO DIFF WBC: CPT | Mod: NTX | Performed by: EMERGENCY MEDICINE

## 2023-02-06 PROCEDURE — 99283 PR EMERGENCY DEPT VISIT,LEVEL III: ICD-10-PCS | Mod: NTX,,, | Performed by: EMERGENCY MEDICINE

## 2023-02-06 PROCEDURE — 80053 COMPREHEN METABOLIC PANEL: CPT | Mod: NTX | Performed by: EMERGENCY MEDICINE

## 2023-02-06 PROCEDURE — 99285 EMERGENCY DEPT VISIT HI MDM: CPT | Mod: 25,NTX

## 2023-02-06 NOTE — ED PROVIDER NOTES
Emergency Department Encounter  Provider Note    Haroldo Dickinson  [id number]  2/6/2023    Evaluation:    History:     Chief Complaint   Patient presents with    Fever     Fever and chills x2 days. Picked up fromdialysis . Complaining of chills and body aches.     Haroldo Dickinson is a 47 y.o. male who has a past medical history of CAD (coronary artery disease), native coronary artery (11/21/2019), Cataract, Diabetes mellitus, Diabetic retinopathy, Dialysis patient, DM type 2 causing renal disease, not at goal, ESRD (end stage renal disease) started dialysis 01/2014 (06/05/2014), Hyperparathyroidism, secondary renal (06/05/2014), Hypertension, NSTEMI (non-ST elevated myocardial infarction) (12/21/2013), Organ transplant candidate (06/05/2014), Pneumonia, Renal cell carcinoma of right kidney, Renal hypertension, and Stroke.    The patient presents to the ED due to fevers and chills.   Patient reports symptoms started 2 days ago on Saturday.  He has history of ESRD on HD. He was at HD and completed his session today. He was sent to the ED due to temp of 99.7 F.  On arrival, patient is afebrile. He endorses a mild cough. No known sick contacts or recent illness.   He does not make urine.  He denies any abdominal pain, N/V/D, CP, SOB, or any other concerns.   Patient reports he is up to date and has received all COVID vaccines, as well as his flu vaccine.   He tested COVID + once, last year.     Past Medical History:   Diagnosis Date    CAD (coronary artery disease), native coronary artery 11/21/2019    Cataract     Diabetes mellitus     Diabetic retinopathy     Dialysis patient     DM type 2 causing renal disease, not at goal     ESRD (end stage renal disease) started dialysis 01/2014 06/05/2014    Hyperparathyroidism, secondary renal 06/05/2014    Hypertension     NSTEMI (non-ST elevated myocardial infarction) 12/21/2013    Organ transplant candidate 06/05/2014    Pneumonia     Renal cell carcinoma of right kidney     Renal  hypertension     Stroke      Past Surgical History:   Procedure Laterality Date    ANGIOGRAM, CORONARY, WITH LEFT HEART CATHETERIZATION N/A 7/1/2021    Procedure: Angiogram, Coronary, with Left Heart Cath;  Surgeon: Miki Zendejas MD;  Location: [facility] CATH LAB;  Service: Cardiology;  Laterality: N/A;    COLONOSCOPY N/A 5/3/2017    Procedure: COLONOSCOPY;  Surgeon: Rufino Carpenter MD;  Location: [facility] ENDO (4TH FLR);  Service: Endoscopy;  Laterality: N/A;  pt states can only schedule on Wednesdays    COLONOSCOPY N/A 2/9/2021    Procedure: COLONOSCOPY;  Surgeon: Edil Wong MD;  Location: [facility] ENDO (4TH FLR);  Service: Endoscopy;  Laterality: N/A;  Dialysis MWF/ labwork day of procedure  right arm aceess  per Dr. Colin pt can hold Plavix 5 days prior see note- sm  COVID test on 2/6/21 at [facility] -     COLONOSCOPY N/A 2/10/2021    Procedure: COLONOSCOPY;  Surgeon: Robert Ayers MD;  Location: [facility] (4TH FLR);  Service: Endoscopy;  Laterality: N/A;  rescheduled due to poor bowel prep-BB  negative covid screening 2/6/21-BB  dialysis M-W-F-BB  okay to r/s for 2/9/21 and to hold Plavix per Dr. KIRAN Wong-BB  labs same day-BB    CORONARY STENT PLACEMENT N/A 7/25/2019    Procedure: INSERTION, STENT, CORONARY ARTERY;  Surgeon: Miki Zendejas MD;  Location: [facility] CATH LAB;  Service: Cardiology;  Laterality: N/A;    DIALYSIS FISTULA CREATION      FISTULOGRAM N/A 2/18/2019    Procedure: Fistulogram;  Surgeon: Yaneth De La Cruz MD;  Location: [facility] CATH LAB;  Service: Cardiology;  Laterality: N/A;    FISTULOGRAM Right 7/23/2019    Procedure: Fistulogram;  Surgeon: Oz Cordoba MD;  Location: [facility] CATH LAB;  Service: Cardiology;  Laterality: Right;    LAPAROSCOPIC ROBOT-ASSISTED SURGICAL REMOVAL OF KIDNEY USING DA JAIME XI Right 5/19/2022    Procedure: XI ROBOTIC NEPHRECTOMY;  Surgeon: Aidan Hendricks MD;  [address]  Service: Urology;  Laterality: Right;  3hrs    LAPAROSCOPIC ROBOT-ASSISTED  SURGICAL REMOVAL OF KIDNEY USING DA JAIME XI Left 1/5/2023    Procedure: XI ROBOTIC NEPHRECTOMY;  Surgeon: Aidan Hendricks MD;  Location: [facility] OR [address] [place] [address];  Service: Urology;  Laterality: Left;  4 hrs    LEFT HEART CATHETERIZATION Left 7/25/2019    Procedure: Left heart cath;  Surgeon: Miki Zendejas MD;  Location: [facility] CATH LAB;  Service: Cardiology;  Laterality: Left;    RETINAL LASER PROCEDURE Bilateral 2018 or 2017    Dr. Rothman    VASCULAR SURGERY       Family History   Problem Relation Age of Onset    Hypertension Mother     Diabetes Mother     Cataracts Mother     Hypertension Father     Hypertension Sister     Kidney disease Brother     Hypertension Brother     Heart disease Brother     Cancer Maternal Grandfather         Colon CA    Colon cancer Neg Hx     Esophageal cancer Neg Hx     Stomach cancer Neg Hx     Rectal cancer Neg Hx     Ulcerative colitis Neg Hx     Irritable bowel syndrome Neg Hx     Crohn's disease Neg Hx     Celiac disease Neg Hx     Glaucoma Neg Hx     Macular degeneration Neg Hx      Social History     Socioeconomic History    Marital status: Single   Occupational History     Employer: [employer]   Tobacco Use    Smoking status: Never    Smokeless tobacco: Never   Substance and Sexual Activity    Alcohol use: Not Currently     Comment: One drink a month    Drug use: No    Sexual activity: Yes     Partners: Female     Birth control/protection: None   Social History Narrative    Disabled    Was     One son          Review of patient's allergies indicates:   Allergen Reactions    No known allergies        Review of Systems   Constitutional:  Positive for chills and fever.   HENT:  Negative for sore throat.    Respiratory:  Positive for cough. Negative for shortness of breath.    Cardiovascular:  Negative for chest pain.   Gastrointestinal:  Negative for constipation, diarrhea, nausea and vomiting.   Genitourinary:  Negative for dysuria, frequency and urgency.    Musculoskeletal:  Negative for back pain.   Skin:  Negative for rash and wound.   Neurological:  Negative for weakness.   Hematological:  Does not bruise/bleed easily.   Psychiatric/Behavioral:  Negative for agitation, behavioral problems and confusion.      Physical Exam:     Initial Vitals [02/06/23 1641]   BP Pulse Resp Temp SpO2   (!) 180/96 76 18 98.2 °F (36.8 °C) 97 %      MAP       --         Physical Exam    Nursing note and vitals reviewed.  Constitutional: He appears well-developed and well-nourished. He is not diaphoretic. No distress.   HENT:   Head: Normocephalic and atraumatic.   Mouth/Throat: Oropharynx is clear and moist.   Eyes: EOM are normal. Pupils are equal, round, and reactive to light.   Neck: No tracheal deviation present.   Cardiovascular:  Normal rate, regular rhythm, normal heart sounds and intact distal pulses.           RUE fistula, palpable thrill present.    Pulmonary/Chest: Breath sounds normal. No stridor. No respiratory distress.   Abdominal: Abdomen is soft. He exhibits no distension and no mass. There is no abdominal tenderness.   Musculoskeletal:         General: No edema. Normal range of motion.     Neurological: He is alert and oriented to person, place, and time. No cranial nerve deficit or sensory deficit.   Skin: Skin is warm and dry. Capillary refill takes less than 2 seconds. No rash noted.   Psychiatric: He has a normal mood and affect. His behavior is normal. Thought content normal.       ED Course:   Procedures    Medical Decision Making:    History Acquisition:   Additional historians utilized:  none    Prior medical records were reviewed:   Admitted 1/14-1/16 for leukocytosis, superficial incision infection after robotic nephrectomy.   Admitted 1/5-1/9 for L nephrectomy.   Admitted 11/30-12/1 for hyperkalemia in setting of L renal mass and known prior renal cell carcinoma.     The patient's list of active medical problems, social history, medications, and allergies  as documented has been reviewed.     Differential Diagnoses:   Based on available information and initial assessment, Differential Diagnosis includes, but is not limited to:  Sepsis, bacteremia, UTI, pneumonia, cellulitis, abscess, indwelling line/catheter infection, cholecystitis, viral URI, gastroenteritis, viral syndrome, sinusitis, otitis media/externa, neoplasm, drug reaction, serotonin syndrome, intoxication/withdrawal syndrome.        EKG:        Labs:     Labs Reviewed   CBC W/ AUTO DIFFERENTIAL - Abnormal; Notable for the following components:       Result Value    RBC 3.08 (*)     Hemoglobin 9.5 (*)     Hematocrit 30.8 (*)      (*)     MCHC 30.8 (*)     RDW 15.5 (*)     Gran % 75.4 (*)     Lymph % 11.2 (*)     All other components within normal limits   COMPREHENSIVE METABOLIC PANEL - Abnormal; Notable for the following components:    BUN 28 (*)     Creatinine 9.1 (*)     Albumin 2.9 (*)     Total Bilirubin 1.5 (*)     ALT 5 (*)     eGFR 6.7 (*)     All other components within normal limits   SARS-COV2 (COVID) WITH FLU/RSV BY PCR     Independent review of the labs ordered include:   See ED course    Imaging:     Imaging Results              X-Ray Chest AP Portable (Final result)  Result time 02/06/23 18:15:00      Final result by Reza Enrique MD (02/06/23 18:15:00)                   Impression:      Findings, as above.      Electronically signed by: Reza Enrique  Date:    02/06/2023  Time:    18:15               Narrative:    EXAMINATION:  XR CHEST AP PORTABLE    CLINICAL HISTORY:  fever, chills, history of ESRD on HD;    TECHNIQUE:  Single frontal view of the chest was performed.    COMPARISON:  01/09/2023    FINDINGS:  Enlarged cardiac silhouette with pulmonary vascular congestion.  Mild interstitial edema.  Moderate bibasilar airspace disease and/or atelectasis, right greater than left.  Possible trace bilateral pleural effusions.                                         Additional  Consideration:   Additional testing considered during clinical course: sepsis eval considered, but patient afebrile, not tachycardic, no SIRS criteria on arrival.     Social determinants of health considered during development of treatment plan include: none    Current co-morbidities considered which impacted clinical decision making: ESRD on HD, CAD, HTN, renal cell carcinoma    Case discussed with additional provider: none    Medications - No data to display   Medical Decision Making:   Initial Assessment:   48 yo M with ESRD on HD presents with fever/chills x 2 days. Mild cough.   Afebrile, vitals reassuring.   Will obtain labs, CXR, COVID/flu swabs, and reassess.   Differential Diagnosis:   Differential Diagnosis includes, but is not limited to:  Sepsis, bacteremia, UTI, pneumonia, cellulitis, abscess, indwelling line/catheter infection, cholecystitis, viral URI, gastroenteritis, viral syndrome, sinusitis, otitis media/externa, neoplasm, drug reaction, serotonin syndrome, intoxication/withdrawal syndrome.    Clinical Tests:   Lab Tests: Reviewed and Ordered  Radiological Study: Ordered and Reviewed  Medical Tests: Ordered and Reviewed  ED Management:  Labs unremarkable, CXR without focal infiltrate.  COVID/flu negative.    After complete evaluation, including thorough history and physical exam, the patient's symptoms are most likely due to viral upper respiratory infection. There are no concerning features on physical exam to suggest bacterial otitis media/externa, sinusitis, pharyngitis, or peritonsillar abscess. Vital signs do not suggest sepsis. Lung sounds are clear and not consistent with pneumonia. There is no neck pain or limited ROM to suggest retropharyngeal abscess or meningitis. The patient will be treated with supportive care.    On re-evaluation, the patient's status has improved.  After complete ED evaluation, clinical impression is most consistent with chills, URI.  PCP follow-up within 2-3 days  was recommended.    After taking into careful account the patient's history, physical exam findings, as well as empirical and objective data obtained throughout ED workup, I feel no emergent medical condition has been identified. No further evaluation or admission was felt to be required, and the patient is stable for discharge from the ED. The patient and any additional family present were updated with test results, overall clinical impression, and recommended further plan of care, including discharge instructions as provided and outpatient follow-up for continued evaluation and management as needed. All questions were answered. The patient expressed understanding and agreed with current plan for discharge and follow-up plan of care. Strict ED return precautions were provided, including return/worsening of current symptoms, new symptoms, or any other concerns.         ED Course as of 02/07/23 1139 Mon Feb 06, 2023 1858 CBC auto differential(!)  H/H stable, no leukocytosis. [SS]   1858 Comprehensive metabolic panel(!)  K WNL, Cr stable from prior, otherwise unremarkable.  [SS]   1859 X-Ray Chest AP Portable  Independently interpreted: cardiomegaly with mild bilateral interstitial edema, no focal infiltrate.  [SS]      ED Course User Index  [SS] Christiano Schmidt MD         Clinical Impression:       ICD-10-CM ICD-9-CM   1. Viral URI with cough  J06.9 465.9   2. Chills  R68.83 780.64      ED Disposition Condition    Discharge Stable               Christiano Schmidt MD  02/07/23 1139

## 2023-02-07 NOTE — ED TRIAGE NOTES
Patient to ED BIB EMS from dialysis with complaints of subjective fevers/chills and generalized fatigue x today. Patient denies sick contacts. Reports was able to complete dialysis prior to coming to ED. Denies HA, CP, shortness of breath, abd pain. Patient AAOx4. Respirations even and unlabored. Skin warm and dry. Pending ED workup at this time.

## 2023-02-07 NOTE — DISCHARGE INSTRUCTIONS
Your COVID and flu tests were negative.  Your chest x-ray did not show any signs of pneumonia.  Your labs tests appeared unchanged from your normal labs.  Follow up with your primary care doctor and dialysis center as scheduled.   Return to the ED if you develop worsening symptoms, shortness of breath, fever higher than 100.4 F, or any other concerns.       Thank you for choosing [facility]!     Our goal in the Emergency Department is to always provide outstanding medical care. You may receive a survey by mail or e-mail in the next week regarding your experience today. We would greatly appreciate you completing and returning the survey. Your feedback provides us with a way to recognize our staff who provide very good care, and it helps us learn how to improve when your experience was below our aspiration of excellence.      It is important to remember that some problems are difficult to diagnose and may not be found during your first visit. Be sure to follow up with your primary care doctor and review any labs/imaging that was performed during your visit with them. If you do not have a primary care doctor, you may contact the one listed on your discharge paperwork, or you may also call the [facility] Appointment Desk at [phone] to schedule an appointment.     All medications may potentially have side effects and it is impossible to predict which medications may give you side effects. If you feel that you are having a negative effect of any medication you should immediately stop taking them and seek medical attention.  Do not drive or make any important decisions for 24 hours if you have received any pain medications, sedatives or mood altering drugs during your ER visit.    We appreciate you trusting us with your medical care. We will be happy to take care of you for all of your future medical needs. You may return to the ER at any time for any new/concerning symptoms, worsening condition, or  failure to improve. We hope you feel better soon.     Sincerely,    Christiano Schmidt Jr., MD  Board-Certified Emergency Medicine Physician  [facility]

## 2023-02-14 ENCOUNTER — TELEPHONE (OUTPATIENT)
Dept: TRANSPLANT | Facility: CLINIC | Age: 48
End: 2023-02-14
Payer: MEDICARE

## 2023-02-14 ENCOUNTER — OFFICE VISIT (OUTPATIENT)
Dept: UROLOGY | Facility: CLINIC | Age: 48
End: 2023-02-14
Payer: MEDICARE

## 2023-02-14 VITALS
WEIGHT: 168 LBS | HEIGHT: 68 IN | DIASTOLIC BLOOD PRESSURE: 112 MMHG | HEART RATE: 85 BPM | BODY MASS INDEX: 25.46 KG/M2 | SYSTOLIC BLOOD PRESSURE: 199 MMHG

## 2023-02-14 DIAGNOSIS — C64.2 RENAL CELL CARCINOMA OF LEFT KIDNEY: Primary | ICD-10-CM

## 2023-02-14 DIAGNOSIS — N18.6 END STAGE RENAL FAILURE ON DIALYSIS: ICD-10-CM

## 2023-02-14 DIAGNOSIS — Z99.2 END STAGE RENAL FAILURE ON DIALYSIS: ICD-10-CM

## 2023-02-14 PROCEDURE — 3077F PR MOST RECENT SYSTOLIC BLOOD PRESSURE >= 140 MM HG: ICD-10-PCS | Mod: CPTII,NTX,S$GLB, | Performed by: UROLOGY

## 2023-02-14 PROCEDURE — 99999 PR PBB SHADOW E&M-EST. PATIENT-LVL III: ICD-10-PCS | Mod: PBBFAC,TXP,, | Performed by: UROLOGY

## 2023-02-14 PROCEDURE — 1159F MED LIST DOCD IN RCRD: CPT | Mod: CPTII,NTX,S$GLB, | Performed by: UROLOGY

## 2023-02-14 PROCEDURE — 3008F PR BODY MASS INDEX (BMI) DOCUMENTED: ICD-10-PCS | Mod: CPTII,NTX,S$GLB, | Performed by: UROLOGY

## 2023-02-14 PROCEDURE — 3077F SYST BP >= 140 MM HG: CPT | Mod: CPTII,NTX,S$GLB, | Performed by: UROLOGY

## 2023-02-14 PROCEDURE — 99024 PR POST-OP FOLLOW-UP VISIT: ICD-10-PCS | Mod: NTX,S$GLB,, | Performed by: UROLOGY

## 2023-02-14 PROCEDURE — 1159F PR MEDICATION LIST DOCUMENTED IN MEDICAL RECORD: ICD-10-PCS | Mod: CPTII,NTX,S$GLB, | Performed by: UROLOGY

## 2023-02-14 PROCEDURE — 3080F DIAST BP >= 90 MM HG: CPT | Mod: CPTII,NTX,S$GLB, | Performed by: UROLOGY

## 2023-02-14 PROCEDURE — 3080F PR MOST RECENT DIASTOLIC BLOOD PRESSURE >= 90 MM HG: ICD-10-PCS | Mod: CPTII,NTX,S$GLB, | Performed by: UROLOGY

## 2023-02-14 PROCEDURE — 99024 POSTOP FOLLOW-UP VISIT: CPT | Mod: NTX,S$GLB,, | Performed by: UROLOGY

## 2023-02-14 PROCEDURE — 3066F NEPHROPATHY DOC TX: CPT | Mod: CPTII,NTX,S$GLB, | Performed by: UROLOGY

## 2023-02-14 PROCEDURE — 1160F RVW MEDS BY RX/DR IN RCRD: CPT | Mod: CPTII,NTX,S$GLB, | Performed by: UROLOGY

## 2023-02-14 PROCEDURE — 1160F PR REVIEW ALL MEDS BY PRESCRIBER/CLIN PHARMACIST DOCUMENTED: ICD-10-PCS | Mod: CPTII,NTX,S$GLB, | Performed by: UROLOGY

## 2023-02-14 PROCEDURE — 3066F PR DOCUMENTATION OF TREATMENT FOR NEPHROPATHY: ICD-10-PCS | Mod: CPTII,NTX,S$GLB, | Performed by: UROLOGY

## 2023-02-14 PROCEDURE — 99999 PR PBB SHADOW E&M-EST. PATIENT-LVL III: CPT | Mod: PBBFAC,TXP,, | Performed by: UROLOGY

## 2023-02-14 PROCEDURE — 3008F BODY MASS INDEX DOCD: CPT | Mod: CPTII,NTX,S$GLB, | Performed by: UROLOGY

## 2023-02-14 NOTE — TELEPHONE ENCOUNTER
Transplant Suitability/Caregiver Confirmation:    SW called pt at [phone] to confirm pt's caregiver plan as pt may be re-activated on transplant waitlist.     Pt confirmed pt's sister, Ariadne Dickinson, is still his primary caregiver. Pt reports his sister will stay at his house during recovery period. SW reminded pt he would need to plan for having a caregiver with him at all times for 1 month after surgery, pt voiced understanding. Pt confirmed pt's aunt and uncle would assist with caregiving duties and transportation.     SW called pt's sister, Ariadne Dickinson, at [phone] to confirm caregiver plan. Pt's sister confirms she plans to stay with pt at his house during recovery. Pt's sister confirmed she is able to take enough time off work to care for pt around the clock during recovery.     SW called pt's aunt, Ginger Conti, at [phone] to confirm backup caregiver plan. Pt's aunt confirmed she and her  are retired, both drive, and have a vehicle. Pt's aunt voiced understanding of caregiver responsibilities. Pt's aunt reports she is able to assist as needed and will rotate caregiver duties with pt's sister, as needed.     Pt's caregiver plan confirmed. Pt remains moderate risk for transplant due to post CVA status resulting in cognitive/physical deficits requiring increased need for caregiver involvement.    Ca Toussaint LMSW

## 2023-02-14 NOTE — PROGRESS NOTES
Subjective:       Patient ID: Haroldo Dickinson is a 47 y.o. male.    Chief Complaint: S/p left nephrectomy      History of Present Illness  HPI  Patient is a 47 y.o. male who is established to our clinic and referred by their transplant team, Michelle Jeter NP for evaluation of renal cysts. S/p right robotic nephrectomy on 5/19/22. Path resulting T1a.  Then underwent a left robotic nephrectomy on 1/5/23.  Here to review pathology.  Incisions healing well.  No complaints at this time.  Did have SSI post-op.      Patient has ESRD 2/2 HTN.  Does HD, MWF via a right forearm AVF.  He does not urinate at all.   No FH of kidney cancer.          SURGICAL PATHOLOGY 1/5/23:   Final Pathologic Diagnosis Left kidney, total nephrectomy:   Multifocal organ confined neoplasms   Case being sent to Bry[facility] Yogiyo for further classification and their   opinion will be issued in a supplemental report  VC      Comment: Interp By Elvia Villegas M.D., Signed on 01/26/2023 at 09:12   Supplemental Diagnosis [place] FINAL DIAGNOSIS:   Interpretation   Kidney, left; radical nephrectomy ([id number]; 01/05/2023): Acquired cystic   disease-associated renal cell carcinoma. See comment.   COMMENT   I have reviewed representative slides of a left radical nephrectomy specimen   taken from the above-mentioned patient, a 47-year-old male. The provided   history of end stage renal disease secondary to hypertension, and long-term   dialysis is noted. In addition, this patient had a prior contralateral   nephrectomy that showed multifocal acquired cystic disease-associated renal   cell carcinoma. Per report, a 4.3 cm mass with additional satellite lesions   (2.3 cm, 2.1 cm and 1.1 cm) was identified on gross examination.   I agree with your assessment. Histologic sections of the submitted material   shows a neoplasm with solid and cystic patterns of growth, comprised of cells   with eosinophilic cytoplasm and prominent nucleoli arranged in a  "sieve-like/   cribriform architectural patterns. Numerous polarizable oxalate crystals are   identified. This is seen in a background of extensive cystic change. In the   context of the provided clinical history, these morphologic features are   consistent with acquired cystic disease-associated renal cell carcinoma.   While most acquired cystic disease-associated renal cell carcinoma follow an   indolent clinical course, aggressive behavior has been documented in rare   instances.   The findings in blocks 1K, 1M, and 1O are consistent with separate papillary   adenomas (<1.5 cm). The satellite lesion in block 1L shows a separate focus   of acquired cystic disease-associated renal cell carcinoma (2.3 cm). Finally,   the section of the renal artery shows extensive calcification and stenosis.   Thank you for the opportunity to review this case in consultation. Your   materials are being returned. If I can be of any further assistance, please   do not hesitate to contact me at [phone].   JO ANN Leggett., Ph.D.   Report attached.   Performing site:   [place] [address]   "Disclaimer:  This case diagnosis was rendered completely by the outside   consultation pathologist and the case is electronically signed by an [facility]   pathologist listed below solely to release the report into the medical   record."   Surgical pathology cancer case summary:   Procedure:  Total nephrectomy   Specimen laterality:  Left   Tumor focality:  Multifocal   Tumor size:  Greatest dimension in cm:  4.3 cm   Histologic type:  Acquired cystic disease-associated renal cell carcinoma   Histologic grade:  G3   Tumor extent: Limited to kidney   Sarcomatoid features:  Not identified   Rhabdoid features:  Not identified   Tumor necrosis:  Not identified   Lymphovascular invasion:  Not identified   Margins status:  All margins negative for invasive carcinoma   Regional " lymph node status:  Not applicable (no regional lymph nodes   submitted or found)   Distant metastases:  Not applicable   TNM descriptors:  m (multiple primary tumors)   Pathologic stage classification:   pT: mpT1b:  Tumor greater than 4 cm but less than or equal to 7 cm in   greatest dimension limited to the kidney   pN: pN not assigned (no nodes submitted or found)   pM:  Not applicable             Review of Systems  All other systems reviewed and negative except pertinent positives noted in HPI.       Objective:     Physical Exam  Constitutional:       General: He is not in acute distress.     Appearance: He is well-developed.   HENT:      Head: Normocephalic and atraumatic.   Eyes:      General: No scleral icterus.  Neck:      Trachea: No tracheal deviation.   Pulmonary:      Effort: Pulmonary effort is normal. No respiratory distress.   Abdominal:      Tenderness: There is no right CVA tenderness or left CVA tenderness.      Comments: Abdominal incisions healing well   Neurological:      Mental Status: He is alert and oriented to person, place, and time.   Psychiatric:         Behavior: Behavior normal.         Thought Content: Thought content normal.         Judgment: Judgment normal.       Lab Review  Lab Results   Component Value Date    COLORU Yellow 12/15/2013    SPECGRAV 1.010 12/15/2013    PHUR 6.0 12/15/2013    NITRITE Negative 12/15/2013    KETONESU Negative 12/15/2013    UROBILINOGEN Negative 12/15/2013         Assessment:        1. Renal cell carcinoma of left kidney    2. End stage renal failure on dialysis                Plan:     Renal cell carcinoma of left kidney    End stage renal failure on dialysis        -continue HD  -pathology reviewed---per mc sendout, acquired cystic disease associated RCC  -plan for cxr, mri abdomen in 1 year.   -generally considered a less aggressive, indolent form of RCC.  -no  contraindication for future kidney transplant.

## 2023-02-15 ENCOUNTER — TELEPHONE (OUTPATIENT)
Dept: TRANSPLANT | Facility: CLINIC | Age: 48
End: 2023-02-15
Payer: MEDICARE

## 2023-02-15 DIAGNOSIS — Z76.82 ORGAN TRANSPLANT CANDIDATE: Primary | ICD-10-CM

## 2023-02-15 NOTE — TELEPHONE ENCOUNTER
----- Message from Fili Ghosh MD sent at 2/15/2023  2:37 PM CST -----  Regarding: RE: Need rec for stress test  I would stick with the PET.  His cath wasn't normal and correlated with his PET.  We can assess risk based on how PET changes.   ----- Message -----  From: Miguelina Baum RN  Sent: 2/15/2023   9:47 AM CST  To: Fili Ghosh MD  Subject: Need rec for stress test                         Hi Dr. Ghosh,    You saw Mr. Dickinson in June of 2021 due to an abnormal Pet Stress. He had a Left Heart Cath on 7/1/21 and was cleared for transplant at that time. The Transplant Physicians want me to schedule an updated Cardiology visit and updated Cardiac testing. I just wanted to get your recommendation regarding the stress test. Please advise on the type of stress test he should have. Thank you!    Miguelina Baum RN

## 2023-02-17 ENCOUNTER — DOCUMENT SCAN (OUTPATIENT)
Dept: HOME HEALTH SERVICES | Facility: HOSPITAL | Age: 48
End: 2023-02-17
Payer: MEDICARE

## 2023-02-23 ENCOUNTER — TELEPHONE (OUTPATIENT)
Dept: TRANSPLANT | Facility: CLINIC | Age: 48
End: 2023-02-23
Payer: MEDICARE

## 2023-03-06 NOTE — Clinical Note
The wire is inserted in the  aortaCorpac .035 J Wire. [FreeTextEntry1] : She reports brain fog ever since she had COVID about 3 years ago\par She says it affects her memory and thinking\par She works as a  without a problem\par Has no trouble carrying out all activities of daily living\par She is quite concerned because her mother  of a brain aneurysm at age 36 and her brother had a stroke a few months ago at age 60, unclear etiology\par \par She also reports numbness in her hands especially at night.  Has been going on for about 3 years.  Wakes her up frequently.  Both hands equally involved.  No significant neck pain\par \par Medical history significant for hypertension and hyperlipidemia.  Patient is awaiting bariatric surgery

## 2023-04-11 ENCOUNTER — DOCUMENT SCAN (OUTPATIENT)
Dept: HOME HEALTH SERVICES | Facility: HOSPITAL | Age: 48
End: 2023-04-11
Payer: MEDICARE

## 2023-05-01 ENCOUNTER — HOSPITAL ENCOUNTER (OUTPATIENT)
Facility: HOSPITAL | Age: 48
Discharge: HOME | End: 2023-05-03
Attending: EMERGENCY MEDICINE | Admitting: HOSPITALIST
Payer: MEDICARE

## 2023-05-01 DIAGNOSIS — N18.6 END STAGE RENAL DISEASE: ICD-10-CM

## 2023-05-01 DIAGNOSIS — R07.9 CHEST PAIN, UNSPECIFIED TYPE: ICD-10-CM

## 2023-05-01 DIAGNOSIS — J96.01 ACUTE RESPIRATORY FAILURE WITH HYPOXIA: ICD-10-CM

## 2023-05-01 DIAGNOSIS — E87.70 HYPERVOLEMIA, UNSPECIFIED HYPERVOLEMIA TYPE: ICD-10-CM

## 2023-05-01 DIAGNOSIS — R07.9 CHEST PAIN: ICD-10-CM

## 2023-05-01 DIAGNOSIS — R09.02 HYPOXIA: Primary | ICD-10-CM

## 2023-05-01 DIAGNOSIS — R79.89 ELEVATED TROPONIN: ICD-10-CM

## 2023-05-01 PROBLEM — R00.2 PALPITATIONS: Status: ACTIVE | Noted: 2023-05-01

## 2023-05-01 PROBLEM — N28.9 HYPERVOLEMIA ASSOCIATED WITH RENAL INSUFFICIENCY: Status: ACTIVE | Noted: 2022-06-13

## 2023-05-01 LAB
ALBUMIN SERPL BCP-MCNC: 2.9 G/DL (ref 3.5–5.2)
ALP SERPL-CCNC: 86 U/L (ref 55–135)
ALT SERPL W/O P-5'-P-CCNC: 8 U/L (ref 10–44)
ANION GAP SERPL CALC-SCNC: 12 MMOL/L (ref 8–16)
AST SERPL-CCNC: 10 U/L (ref 10–40)
BASOPHILS # BLD AUTO: 0.11 K/UL (ref 0–0.2)
BASOPHILS NFR BLD: 1 % (ref 0–1.9)
BILIRUB SERPL-MCNC: 0.9 MG/DL (ref 0.1–1)
BNP SERPL-MCNC: 2396 PG/ML (ref 0–99)
BUN SERPL-MCNC: 69 MG/DL (ref 6–20)
CALCIUM SERPL-MCNC: 9.6 MG/DL (ref 8.7–10.5)
CHLORIDE SERPL-SCNC: 110 MMOL/L (ref 95–110)
CO2 SERPL-SCNC: 18 MMOL/L (ref 23–29)
CREAT SERPL-MCNC: 12.8 MG/DL (ref 0.5–1.4)
DIFFERENTIAL METHOD: ABNORMAL
EOSINOPHIL # BLD AUTO: 0.2 K/UL (ref 0–0.5)
EOSINOPHIL NFR BLD: 1.6 % (ref 0–8)
ERYTHROCYTE [DISTWIDTH] IN BLOOD BY AUTOMATED COUNT: 16.4 % (ref 11.5–14.5)
EST. GFR  (NO RACE VARIABLE): 4.4 ML/MIN/1.73 M^2
GLUCOSE SERPL-MCNC: 157 MG/DL (ref 70–110)
HCT VFR BLD AUTO: 30.2 % (ref 40–54)
HGB BLD-MCNC: 9.5 G/DL (ref 14–18)
IMM GRANULOCYTES # BLD AUTO: 0.04 K/UL (ref 0–0.04)
IMM GRANULOCYTES NFR BLD AUTO: 0.4 % (ref 0–0.5)
LYMPHOCYTES # BLD AUTO: 0.9 K/UL (ref 1–4.8)
LYMPHOCYTES NFR BLD: 8.3 % (ref 18–48)
MCH RBC QN AUTO: 29.4 PG (ref 27–31)
MCHC RBC AUTO-ENTMCNC: 31.5 G/DL (ref 32–36)
MCV RBC AUTO: 94 FL (ref 82–98)
MONOCYTES # BLD AUTO: 1.1 K/UL (ref 0.3–1)
MONOCYTES NFR BLD: 10 % (ref 4–15)
NEUTROPHILS # BLD AUTO: 8.8 K/UL (ref 1.8–7.7)
NEUTROPHILS NFR BLD: 78.7 % (ref 38–73)
NRBC BLD-RTO: 0 /100 WBC
PLATELET # BLD AUTO: 309 K/UL (ref 150–450)
PMV BLD AUTO: 10 FL (ref 9.2–12.9)
POC CARDIAC TROPONIN I: 0.18 NG/ML (ref 0–0.08)
POC CARDIAC TROPONIN I: 0.2 NG/ML (ref 0–0.08)
POTASSIUM SERPL-SCNC: 4.4 MMOL/L (ref 3.5–5.1)
PROT SERPL-MCNC: 6.9 G/DL (ref 6–8.4)
RBC # BLD AUTO: 3.23 M/UL (ref 4.6–6.2)
SAMPLE: ABNORMAL
SAMPLE: ABNORMAL
SARS-COV-2 RDRP RESP QL NAA+PROBE: NEGATIVE
SODIUM SERPL-SCNC: 140 MMOL/L (ref 136–145)
TROPONIN I SERPL DL<=0.01 NG/ML-MCNC: 0.25 NG/ML (ref 0–0.03)
TROPONIN I SERPL DL<=0.01 NG/ML-MCNC: 0.32 NG/ML (ref 0–0.03)
WBC # BLD AUTO: 11.17 K/UL (ref 3.9–12.7)

## 2023-05-01 PROCEDURE — 99223 1ST HOSP IP/OBS HIGH 75: CPT | Mod: AI,NTX,, | Performed by: PHYSICIAN ASSISTANT

## 2023-05-01 PROCEDURE — 99223 PR INITIAL HOSPITAL CARE,LEVL III: ICD-10-PCS | Mod: AI,NTX,, | Performed by: PHYSICIAN ASSISTANT

## 2023-05-01 PROCEDURE — 99291 CRITICAL CARE FIRST HOUR: CPT | Mod: GC,NTX,, | Performed by: EMERGENCY MEDICINE

## 2023-05-01 PROCEDURE — 93005 ELECTROCARDIOGRAM TRACING: CPT | Mod: NTX

## 2023-05-01 PROCEDURE — 99214 PR OFFICE/OUTPT VISIT, EST, LEVL IV, 30-39 MIN: ICD-10-PCS | Mod: NTX,,, | Performed by: NURSE PRACTITIONER

## 2023-05-01 PROCEDURE — 93010 ELECTROCARDIOGRAM REPORT: CPT | Mod: NTX,,, | Performed by: INTERNAL MEDICINE

## 2023-05-01 PROCEDURE — 94761 N-INVAS EAR/PLS OXIMETRY MLT: CPT | Mod: NTX

## 2023-05-01 PROCEDURE — 93010 EKG 12-LEAD: ICD-10-PCS | Mod: NTX,,, | Performed by: INTERNAL MEDICINE

## 2023-05-01 PROCEDURE — 25000003 PHARM REV CODE 250: Mod: NTX

## 2023-05-01 PROCEDURE — 80053 COMPREHEN METABOLIC PANEL: CPT | Mod: NTX

## 2023-05-01 PROCEDURE — G0378 HOSPITAL OBSERVATION PER HR: HCPCS | Mod: NTX

## 2023-05-01 PROCEDURE — 99285 EMERGENCY DEPT VISIT HI MDM: CPT | Mod: 25,NTX,CS

## 2023-05-01 PROCEDURE — G0257 UNSCHED DIALYSIS ESRD PT HOS: HCPCS | Mod: NTX

## 2023-05-01 PROCEDURE — 83880 ASSAY OF NATRIURETIC PEPTIDE: CPT | Mod: NTX

## 2023-05-01 PROCEDURE — U0002 COVID-19 LAB TEST NON-CDC: HCPCS | Mod: NTX | Performed by: EMERGENCY MEDICINE

## 2023-05-01 PROCEDURE — 63600175 PHARM REV CODE 636 W HCPCS: Mod: NTX | Performed by: PHYSICIAN ASSISTANT

## 2023-05-01 PROCEDURE — 85025 COMPLETE CBC W/AUTO DIFF WBC: CPT | Mod: NTX

## 2023-05-01 PROCEDURE — 99214 OFFICE O/P EST MOD 30 MIN: CPT | Mod: NTX,,, | Performed by: NURSE PRACTITIONER

## 2023-05-01 PROCEDURE — 27100171 HC OXYGEN HIGH FLOW UP TO 24 HOURS: Mod: NTX

## 2023-05-01 PROCEDURE — 99291 PR CRITICAL CARE, E/M 30-74 MINUTES: ICD-10-PCS | Mod: GC,NTX,, | Performed by: EMERGENCY MEDICINE

## 2023-05-01 PROCEDURE — 84484 ASSAY OF TROPONIN QUANT: CPT | Mod: NTX

## 2023-05-01 PROCEDURE — 96374 THER/PROPH/DIAG INJ IV PUSH: CPT | Mod: NTX

## 2023-05-01 RX ORDER — SODIUM CHLORIDE 9 MG/ML
INJECTION, SOLUTION INTRAVENOUS ONCE
Status: DISCONTINUED | OUTPATIENT
Start: 2023-05-01 | End: 2023-05-02

## 2023-05-01 RX ORDER — HEPARIN SODIUM 5000 [USP'U]/ML
5000 INJECTION, SOLUTION INTRAVENOUS; SUBCUTANEOUS EVERY 8 HOURS
Status: DISCONTINUED | OUTPATIENT
Start: 2023-05-01 | End: 2023-05-03 | Stop reason: HOSPADM

## 2023-05-01 RX ORDER — DEXTROSE 40 %
15 GEL (GRAM) ORAL
Status: DISCONTINUED | OUTPATIENT
Start: 2023-05-01 | End: 2023-05-03 | Stop reason: HOSPADM

## 2023-05-01 RX ORDER — POLYETHYLENE GLYCOL 3350 17 G/17G
17 POWDER, FOR SOLUTION ORAL DAILY
Status: DISCONTINUED | OUTPATIENT
Start: 2023-05-01 | End: 2023-05-03 | Stop reason: HOSPADM

## 2023-05-01 RX ORDER — ONDANSETRON 2 MG/ML
4 INJECTION INTRAMUSCULAR; INTRAVENOUS
Status: ACTIVE | OUTPATIENT
Start: 2023-05-01 | End: 2023-05-02

## 2023-05-01 RX ORDER — NALOXONE HCL 0.4 MG/ML
0.02 VIAL (ML) INJECTION
Status: DISCONTINUED | OUTPATIENT
Start: 2023-05-01 | End: 2023-05-03 | Stop reason: HOSPADM

## 2023-05-01 RX ORDER — ONDANSETRON 2 MG/ML
4 INJECTION INTRAMUSCULAR; INTRAVENOUS EVERY 8 HOURS PRN
Status: DISCONTINUED | OUTPATIENT
Start: 2023-05-01 | End: 2023-05-03 | Stop reason: HOSPADM

## 2023-05-01 RX ORDER — GUAIFENESIN 100 MG/5ML
200 SOLUTION ORAL
Status: DISPENSED | OUTPATIENT
Start: 2023-05-01 | End: 2023-05-02

## 2023-05-01 RX ORDER — GLUCAGON 1 MG
1 KIT INJECTION
Status: DISCONTINUED | OUTPATIENT
Start: 2023-05-01 | End: 2023-05-03 | Stop reason: HOSPADM

## 2023-05-01 RX ORDER — ACETAMINOPHEN 325 MG/1
650 TABLET ORAL EVERY 8 HOURS PRN
Status: DISCONTINUED | OUTPATIENT
Start: 2023-05-01 | End: 2023-05-03 | Stop reason: HOSPADM

## 2023-05-01 RX ORDER — HYDROCODONE BITARTRATE AND ACETAMINOPHEN 5; 325 MG/1; MG/1
1 TABLET ORAL EVERY 6 HOURS PRN
Status: DISCONTINUED | OUTPATIENT
Start: 2023-05-01 | End: 2023-05-03 | Stop reason: HOSPADM

## 2023-05-01 RX ORDER — HYDRALAZINE HYDROCHLORIDE 25 MG/1
25 TABLET, FILM COATED ORAL EVERY 8 HOURS
Status: DISCONTINUED | OUTPATIENT
Start: 2023-05-01 | End: 2023-05-01

## 2023-05-01 RX ORDER — AMLODIPINE BESYLATE 10 MG/1
10 TABLET ORAL DAILY
Status: DISCONTINUED | OUTPATIENT
Start: 2023-05-01 | End: 2023-05-03 | Stop reason: HOSPADM

## 2023-05-01 RX ORDER — DEXTROSE 40 %
30 GEL (GRAM) ORAL
Status: DISCONTINUED | OUTPATIENT
Start: 2023-05-01 | End: 2023-05-03 | Stop reason: HOSPADM

## 2023-05-01 RX ORDER — AMLODIPINE BESYLATE 10 MG/1
10 TABLET ORAL DAILY
Status: DISCONTINUED | OUTPATIENT
Start: 2023-05-01 | End: 2023-05-01

## 2023-05-01 RX ORDER — SODIUM CHLORIDE 0.9 % (FLUSH) 0.9 %
5 SYRINGE (ML) INJECTION
Status: DISCONTINUED | OUTPATIENT
Start: 2023-05-01 | End: 2023-05-03 | Stop reason: HOSPADM

## 2023-05-01 RX ORDER — ASPIRIN 325 MG
325 TABLET ORAL
Status: COMPLETED | OUTPATIENT
Start: 2023-05-01 | End: 2023-05-01

## 2023-05-01 RX ORDER — TALC
6 POWDER (GRAM) TOPICAL NIGHTLY PRN
Status: DISCONTINUED | OUTPATIENT
Start: 2023-05-01 | End: 2023-05-03 | Stop reason: HOSPADM

## 2023-05-01 RX ORDER — CINACALCET 30 MG/1
90 TABLET, FILM COATED ORAL DAILY
Status: DISCONTINUED | OUTPATIENT
Start: 2023-05-01 | End: 2023-05-03 | Stop reason: HOSPADM

## 2023-05-01 RX ORDER — HYDRALAZINE HYDROCHLORIDE 20 MG/ML
10 INJECTION INTRAMUSCULAR; INTRAVENOUS EVERY 8 HOURS PRN
Status: DISCONTINUED | OUTPATIENT
Start: 2023-05-01 | End: 2023-05-03 | Stop reason: HOSPADM

## 2023-05-01 RX ORDER — ONDANSETRON 8 MG/1
8 TABLET, ORALLY DISINTEGRATING ORAL EVERY 8 HOURS PRN
Status: DISCONTINUED | OUTPATIENT
Start: 2023-05-01 | End: 2023-05-03 | Stop reason: HOSPADM

## 2023-05-01 RX ORDER — FERROUS GLUCONATE 324(37.5)
324 TABLET ORAL
Status: DISCONTINUED | OUTPATIENT
Start: 2023-05-01 | End: 2023-05-03 | Stop reason: HOSPADM

## 2023-05-01 RX ORDER — BISACODYL 10 MG
10 SUPPOSITORY, RECTAL RECTAL DAILY PRN
Status: DISCONTINUED | OUTPATIENT
Start: 2023-05-01 | End: 2023-05-03 | Stop reason: HOSPADM

## 2023-05-01 RX ORDER — CARVEDILOL 12.5 MG/1
25 TABLET ORAL 2 TIMES DAILY WITH MEALS
Status: DISCONTINUED | OUTPATIENT
Start: 2023-05-01 | End: 2023-05-01

## 2023-05-01 RX ORDER — CARVEDILOL 25 MG/1
25 TABLET ORAL 2 TIMES DAILY WITH MEALS
Status: DISCONTINUED | OUTPATIENT
Start: 2023-05-01 | End: 2023-05-03 | Stop reason: HOSPADM

## 2023-05-01 RX ORDER — HYDRALAZINE HYDROCHLORIDE 25 MG/1
25 TABLET, FILM COATED ORAL EVERY 8 HOURS
Status: DISCONTINUED | OUTPATIENT
Start: 2023-05-01 | End: 2023-05-03 | Stop reason: HOSPADM

## 2023-05-01 RX ORDER — MAG HYDROX/ALUMINUM HYD/SIMETH 200-200-20
30 SUSPENSION, ORAL (FINAL DOSE FORM) ORAL 4 TIMES DAILY PRN
Status: DISCONTINUED | OUTPATIENT
Start: 2023-05-01 | End: 2023-05-03 | Stop reason: HOSPADM

## 2023-05-01 RX ORDER — ACETAMINOPHEN 325 MG/1
650 TABLET ORAL EVERY 4 HOURS PRN
Status: DISCONTINUED | OUTPATIENT
Start: 2023-05-01 | End: 2023-05-03 | Stop reason: HOSPADM

## 2023-05-01 RX ORDER — SIMETHICONE 80 MG
1 TABLET,CHEWABLE ORAL 4 TIMES DAILY PRN
Status: DISCONTINUED | OUTPATIENT
Start: 2023-05-01 | End: 2023-05-03 | Stop reason: HOSPADM

## 2023-05-01 RX ORDER — HYDROCODONE BITARTRATE AND ACETAMINOPHEN 10; 325 MG/1; MG/1
1 TABLET ORAL EVERY 6 HOURS PRN
Status: DISCONTINUED | OUTPATIENT
Start: 2023-05-01 | End: 2023-05-03 | Stop reason: HOSPADM

## 2023-05-01 RX ADMIN — ONDANSETRON 4 MG: 2 INJECTION INTRAMUSCULAR; INTRAVENOUS at 05:05

## 2023-05-01 RX ADMIN — ASPIRIN 325 MG: 325 TABLET ORAL at 12:05

## 2023-05-01 RX ADMIN — HYDRALAZINE HYDROCHLORIDE 25 MG: 25 TABLET, FILM COATED ORAL at 09:05

## 2023-05-01 RX ADMIN — CARVEDILOL 25 MG: 25 TABLET, FILM COATED ORAL at 12:05

## 2023-05-01 RX ADMIN — HYDRALAZINE HYDROCHLORIDE 25 MG: 25 TABLET, FILM COATED ORAL at 12:05

## 2023-05-01 RX ADMIN — AMLODIPINE BESYLATE 10 MG: 10 TABLET ORAL at 12:05

## 2023-05-01 NOTE — SUBJECTIVE & OBJECTIVE
Past Medical History:   Diagnosis Date    CAD (coronary artery disease), native coronary artery 11/21/2019    Cataract     Diabetes mellitus     Diabetic retinopathy     Dialysis patient     DM type 2 causing renal disease, not at goal     ESRD (end stage renal disease) started dialysis 01/2014 06/05/2014    Hyperparathyroidism, secondary renal 06/05/2014    Hypertension     NSTEMI (non-ST elevated myocardial infarction) 12/21/2013    Organ transplant candidate 06/05/2014    Pneumonia     Renal cell carcinoma of right kidney     Renal hypertension     Stroke        Past Surgical History:   Procedure Laterality Date    ANGIOGRAM, CORONARY, WITH LEFT HEART CATHETERIZATION N/A 7/1/2021    Procedure: Angiogram, Coronary, with Left Heart Cath;  Surgeon: Miki Zendejas MD;  Location: [facility] CATH LAB;  Service: Cardiology;  Laterality: N/A;    COLONOSCOPY N/A 5/3/2017    Procedure: COLONOSCOPY;  Surgeon: Rufino Carpenter MD;  Location: [facility] ([address] [place] [address]);  Service: Endoscopy;  Laterality: N/A;  pt states can only schedule on Wednesdays    COLONOSCOPY N/A 2/9/2021    Procedure: COLONOSCOPY;  Surgeon: Edil Wong MD;  [facility]  Service: Endoscopy;  Laterality: N/A;  Dialysis MWF/ labwork day of procedure  right arm aceess  per Dr. Colin pt can hold Plavix 5 days prior see note- sm  COVID test on 2/6/21 at [facility] - sm    COLONOSCOPY N/A 2/10/2021    Procedure: COLONOSCOPY;  Surgeon: Robert Ayers MD;  [facility]  Service: Endoscopy;  Laterality: N/A;  rescheduled due to poor bowel prep-BB  negative covid screening 2/6/21-BB  dialysis M-W-F-BB  okay to r/s for 2/9/21 and to hold Plavix per Dr. KIRAN Wong-BB  labs same day-BB    CORONARY STENT PLACEMENT N/A 7/25/2019    Procedure: INSERTION, STENT, CORONARY ARTERY;  Surgeon: Miki Zendejas MD;  Location: [facility] CATH LAB;  Service: Cardiology;  Laterality: N/A;    DIALYSIS FISTULA CREATION      FISTULOGRAM N/A 2/18/2019    Procedure:  Fistulogram;  Surgeon: Yaneth De La Cruz MD;  Location: [facility] CATH LAB;  Service: Cardiology;  Laterality: N/A;    FISTULOGRAM Right 7/23/2019    Procedure: Fistulogram;  Surgeon: Oz Cordoba MD;  Location: [facility] CATH LAB;  Service: Cardiology;  Laterality: Right;    LAPAROSCOPIC ROBOT-ASSISTED SURGICAL REMOVAL OF KIDNEY USING DA JAIME XI Right 5/19/2022    Procedure: XI ROBOTIC NEPHRECTOMY;  Surgeon: Aidan Hendricks MD;  Location: [address] FLR;  Service: Urology;  Laterality: Right;  3hrs    LAPAROSCOPIC ROBOT-ASSISTED SURGICAL REMOVAL OF KIDNEY USING DA JAIME XI Left 1/5/2023    Procedure: XI ROBOTIC NEPHRECTOMY;  Surgeon: Aidan Hendricks MD;  [facility];  Service: Urology;  Laterality: Left;  4 hrs    LEFT HEART CATHETERIZATION Left 7/25/2019    Procedure: Left heart cath;  Surgeon: Miki Zendejas MD;  Location: [facility] CATH LAB;  Service: Cardiology;  Laterality: Left;    REPAIR  1/5/2023    Procedure: REPAIR; COLOTOMY;  Surgeon: Maikel Lamb MD;  [address];  Service: General;;    RETINAL LASER PROCEDURE Bilateral 2018 or 2017    Dr. Rothman    VASCULAR SURGERY         Review of patient's allergies indicates:   Allergen Reactions    No known allergies      Current Facility-Administered Medications   Medication Frequency    0.9%  NaCl infusion Once    acetaminophen tablet 650 mg Q8H PRN    acetaminophen tablet 650 mg Q4H PRN    aluminum-magnesium hydroxide-simethicone 200-200-20 mg/5 mL suspension 30 mL QID PRN    amLODIPine tablet 10 mg Daily    bisacodyL suppository 10 mg Daily PRN    carvediloL tablet 25 mg BID WM    dextrose 10% bolus 125 mL 125 mL PRN    dextrose 10% bolus 250 mL 250 mL PRN    dextrose 40 % gel 15,000 mg PRN    dextrose 40 % gel 30,000 mg PRN    glucagon (human recombinant) injection 1 mg PRN    guaiFENesin 100 mg/5 ml syrup 200 mg ED 1 Time    heparin (porcine) injection 5,000 Units Q8H    hydrALAZINE injection 10 mg Q8H PRN    hydrALAZINE  tablet 25 mg Q8H    HYDROcodone-acetaminophen  mg per tablet 1 tablet Q6H PRN    HYDROcodone-acetaminophen 5-325 mg per tablet 1 tablet Q6H PRN    melatonin tablet 6 mg Nightly PRN    naloxone 0.4 mg/mL injection 0.02 mg PRN    ondansetron disintegrating tablet 8 mg Q8H PRN    ondansetron injection 4 mg ED 1 Time    ondansetron injection 4 mg Q8H PRN    polyethylene glycol packet 17 g Daily    simethicone chewable tablet 80 mg QID PRN    sodium chloride 0.9% flush 5 mL PRN     Current Outpatient Medications   Medication    amLODIPine (NORVASC) 10 MG tablet    aspirin (ECOTRIN) 81 MG EC tablet    AURYXIA 210 mg iron Tab    carvediloL (COREG) 25 MG tablet    cinacalcet (SENSIPAR) 90 MG Tab    hydrALAZINE (APRESOLINE) 25 MG tablet    OPW TEST CLAIM - DO NOT FILL    oxyCODONE (ROXICODONE) 5 MG immediate release tablet    polyethylene glycol (GLYCOLAX) 17 gram/dose powder    sevelamer carbonate (RENVELA) 800 mg Tab     Facility-Administered Medications Ordered in Other Encounters   Medication Frequency    lidocaine (PF) 10 mg/ml (1%) injection 10 mg Once     Family History       Problem Relation (Age of Onset)    Cancer Maternal Grandfather    Cataracts Mother    Diabetes Mother    Heart disease Brother    Hypertension Mother, Father, Sister, Brother    Kidney disease Brother          Tobacco Use    Smoking status: Never    Smokeless tobacco: Never   Substance and Sexual Activity    Alcohol use: Not Currently     Comment: One drink a month    Drug use: No    Sexual activity: Yes     Partners: Female     Birth control/protection: None     Review of Systems   Constitutional:  Negative for activity change, appetite change, fatigue and fever.   HENT:  Negative for congestion and hearing loss.    Eyes:  Negative for visual disturbance.   Respiratory:  Positive for shortness of breath. Negative for cough and chest tightness.    Cardiovascular:  Positive for chest pain. Negative for palpitations and leg swelling.    Gastrointestinal:  Negative for abdominal distention, abdominal pain, blood in stool, constipation, diarrhea, nausea and vomiting.   Genitourinary:  Positive for decreased urine volume. Negative for dysuria and urgency.   Musculoskeletal:  Negative for gait problem.   Skin:  Negative for wound.   Allergic/Immunologic: Negative.    Neurological:  Negative for dizziness, weakness and headaches.   Psychiatric/Behavioral:  Negative for agitation, behavioral problems, confusion and decreased concentration.    Objective:     Vital Signs (Most Recent):  Temp: 97.4 °F (36.3 °C) (05/01/23 1128)  Pulse: 85 (05/01/23 1427)  Resp: (!) 22 (05/01/23 1427)  BP: (!) 221/107 (05/01/23 1234)  SpO2: 95 % (05/01/23 1427)   Vital Signs (24h Range):  Temp:  [97.4 °F (36.3 °C)] 97.4 °F (36.3 °C)  Pulse:  [85-93] 85  Resp:  [18-22] 22  SpO2:  [91 %-97 %] 95 %  BP: (203-221)/(100-107) 221/107     Weight: 76.2 kg (168 lb) (05/01/23 1128)  Body mass index is 25.54 kg/m².  Body surface area is 1.91 meters squared.    No intake/output data recorded.    Physical Exam  Vitals and nursing note reviewed.   Constitutional:       General: He is not in acute distress.     Appearance: He is well-developed. He is ill-appearing and toxic-appearing.   HENT:      Head: Normocephalic and atraumatic.      Right Ear: Hearing and external ear normal.      Left Ear: Hearing and external ear normal.      Mouth/Throat:      Mouth: Mucous membranes are moist.   Eyes:      General: No scleral icterus.     Conjunctiva/sclera: Conjunctivae normal.   Cardiovascular:      Rate and Rhythm: Normal rate and regular rhythm.      Pulses: Normal pulses.      Heart sounds: Normal heart sounds.      Arteriovenous access: Right arteriovenous access is present.  Pulmonary:      Effort: Pulmonary effort is normal. No respiratory distress.      Breath sounds: Wheezing and rales present.   Abdominal:      General: Bowel sounds are normal. There is no distension.      Palpations:  Abdomen is soft.      Tenderness: There is no abdominal tenderness.   Musculoskeletal:         General: No swelling. Normal range of motion.      Cervical back: Normal range of motion.      Right lower leg: No edema.      Left lower leg: No edema.   Skin:     General: Skin is warm and dry.   Neurological:      General: No focal deficit present.      Mental Status: He is alert and oriented to person, place, and time. Mental status is at baseline.   Psychiatric:         Mood and Affect: Mood normal.         Behavior: Behavior normal. Behavior is cooperative.         Thought Content: Thought content normal.         Judgment: Judgment normal.       Significant Labs:  CBC:   Recent Labs   Lab 05/01/23  1226   WBC 11.17   RBC 3.23*   HGB 9.5*   HCT 30.2*      MCV 94   MCH 29.4   MCHC 31.5*     CMP:   Recent Labs   Lab 05/01/23  1226   *   CALCIUM 9.6   ALBUMIN 2.9*   PROT 6.9      K 4.4   CO2 18*      BUN 69*   CREATININE 12.8*   ALKPHOS 86   ALT 8*   AST 10   BILITOT 0.9     All labs within the past 24 hours have been reviewed.

## 2023-05-01 NOTE — ED PROVIDER NOTES
Encounter Date: 5/1/2023       History     Chief Complaint   Patient presents with    Chest Pain     Was at [facility] for Dialysis today, but refused d/t L chest pain that only exists when coughing. Pain is now 0/10     Haroldo Dickinson is a 47 y.o. male who has a past medical history of CAD (coronary artery disease), native coronary artery (11/21/2019), Cataract, Diabetes mellitus, Diabetic retinopathy, Dialysis patient, DM type 2 causing renal disease, not at goal, ESRD (end stage renal disease) started dialysis 01/2014 (06/05/2014), Hyperparathyroidism, secondary renal (06/05/2014), Hypertension, NSTEMI (non-ST elevated myocardial infarction) (12/21/2013), Organ transplant candidate (06/05/2014), Pneumonia, Renal cell carcinoma of right kidney, Renal hypertension, and Stroke.    He is presenting to the emergency department today from dialysis center for left-sided chest pain.  States symptoms started yesterday.  Chest pain is worse with lying flat.  He does not exert himself much to speak to worsening with that.  No diaphoresis.  He does have nausea without vomiting.  Patient states he does not produce any urine.  Aside from today he has not recently missed any dialysis sessions.  Last dialysis was Friday, 4 days ago.  He denies fever but does report dry cough.  No recent sick contacts.  No lower extremity swelling or calf pain.  Patient is not normally on oxygen.    The history is provided by the patient. No  was used.   Review of patient's allergies indicates:   Allergen Reactions    No known allergies      Past Medical History:   Diagnosis Date    CAD (coronary artery disease), native coronary artery 11/21/2019    Cataract     Diabetes mellitus     Diabetic retinopathy     Dialysis patient     DM type 2 causing renal disease, not at goal     ESRD (end stage renal disease) started dialysis 01/2014 06/05/2014    Hyperparathyroidism, secondary renal 06/05/2014    Hypertension     NSTEMI (non-ST  elevated myocardial infarction) 12/21/2013    Organ transplant candidate 06/05/2014    Pneumonia     Renal cell carcinoma of right kidney     Renal hypertension     Stroke      Past Surgical History:   Procedure Laterality Date    ANGIOGRAM, CORONARY, WITH LEFT HEART CATHETERIZATION N/A 7/1/2021    Procedure: Angiogram, Coronary, with Left Heart Cath;  Surgeon: Miki Zendejas MD;  Location: [facility] CATH LAB;  Service: Cardiology;  Laterality: N/A;    COLONOSCOPY N/A 5/3/2017    Procedure: COLONOSCOPY;  Surgeon: Rufino Carpenter MD;  Location: [facility] (4TH FLR);  Service: Endoscopy;  Laterality: N/A;  pt states can only schedule on Wednesdays    COLONOSCOPY N/A 2/9/2021    Procedure: COLONOSCOPY;  Surgeon: Edil Wong MD;  Location: [facility] ENDO (4TH FLR);  Service: Endoscopy;  Laterality: N/A;  Dialysis MWF/ labwork day of procedure  right arm aceess  per Dr. Colin pt can hold Plavix 5 days prior see note- sm  COVID test on 2/6/21 at [facility] -     COLONOSCOPY N/A 2/10/2021    Procedure: COLONOSCOPY;  Surgeon: Robert Ayers MD;  Location: [facility] (4TH FLR);  Service: Endoscopy;  Laterality: N/A;  rescheduled due to poor bowel prep-BB  negative covid screening 2/6/21-BB  dialysis M-W-F-BB  okay to r/s for 2/9/21 and to hold Plavix per Dr. KIRAN Wong-BB  labs same day-BB    CORONARY STENT PLACEMENT N/A 7/25/2019    Procedure: INSERTION, STENT, CORONARY ARTERY;  Surgeon: Miki Zendejas MD;  Location: [facility] CATH LAB;  Service: Cardiology;  Laterality: N/A;    DIALYSIS FISTULA CREATION      FISTULOGRAM N/A 2/18/2019    Procedure: Fistulogram;  Surgeon: Yaneth De La Cruz MD;  Location: [facility] CATH LAB;  Service: Cardiology;  Laterality: N/A;    FISTULOGRAM Right 7/23/2019    Procedure: Fistulogram;  Surgeon: Oz Cordoba MD;  Location: [facility] CATH LAB;  Service: Cardiology;  Laterality: Right;    LAPAROSCOPIC ROBOT-ASSISTED SURGICAL REMOVAL OF KIDNEY USING DA JAIME XI Right 5/19/2022    Procedure: XI ROBOTIC  NEPHRECTOMY;  Surgeon: Aidan Hendricks MD;  [address]  Service: Urology;  Laterality: Right;  3hrs    LAPAROSCOPIC ROBOT-ASSISTED SURGICAL REMOVAL OF KIDNEY USING DA JAIME XI Left 1/5/2023    Procedure: XI ROBOTIC NEPHRECTOMY;  Surgeon: Aidan Hendricks MD;  [address];  Service: Urology;  Laterality: Left;  4 hrs    LEFT HEART CATHETERIZATION Left 7/25/2019    Procedure: Left heart cath;  Surgeon: Miki Zendejas MD;  Location: [facility] CATH LAB;  Service: Cardiology;  Laterality: Left;    REPAIR  1/5/2023    Procedure: REPAIR; COLOTOMY;  Surgeon: Maikel Lamb MD;  Location: [address];  Service: General;;    RETINAL LASER PROCEDURE Bilateral 2018 or 2017    Dr. Rothman    VASCULAR SURGERY       Family History   Problem Relation Age of Onset    Hypertension Mother     Diabetes Mother     Cataracts Mother     Hypertension Father     Hypertension Sister     Kidney disease Brother     Hypertension Brother     Heart disease Brother     Cancer Maternal Grandfather         Colon CA    Colon cancer Neg Hx     Esophageal cancer Neg Hx     Stomach cancer Neg Hx     Rectal cancer Neg Hx     Ulcerative colitis Neg Hx     Irritable bowel syndrome Neg Hx     Crohn's disease Neg Hx     Celiac disease Neg Hx     Glaucoma Neg Hx     Macular degeneration Neg Hx      Social History     Tobacco Use    Smoking status: Never    Smokeless tobacco: Never   Substance Use Topics    Alcohol use: Not Currently     Comment: One drink a month    Drug use: No     Review of Systems   Constitutional:         See HPI     Physical Exam     Initial Vitals [05/01/23 1128]   BP Pulse Resp Temp SpO2   (!) 203/100 93 18 97.4 °F (36.3 °C) 97 %      MAP       --         Physical Exam    Nursing note and vitals reviewed.  Constitutional: He appears well-developed and well-nourished. He is not diaphoretic. No distress.   HENT:   Head: Normocephalic and atraumatic.   Eyes: Conjunctivae and EOM are normal.   Neck:  Neck supple.   Normal range of motion.  Cardiovascular:  Normal rate, regular rhythm, normal heart sounds and intact distal pulses.           Pulmonary/Chest: No respiratory distress. He has no wheezes. He has rhonchi. He has rales.   Satting 90% on 2 L nasal cannula.   Abdominal: Abdomen is soft. Bowel sounds are normal. He exhibits no distension. There is no abdominal tenderness. There is no rebound and no guarding.   Musculoskeletal:         General: No tenderness or edema. Normal range of motion.      Cervical back: Normal range of motion and neck supple.      Comments: Right upper extremity with AV fistula and forearm, palpable thrill     Neurological: He is alert. He has normal strength.   Skin: Skin is warm and dry.   Psychiatric: He has a normal mood and affect. Thought content normal.       ED Course   Procedures  Labs Reviewed   CBC W/ AUTO DIFFERENTIAL - Abnormal; Notable for the following components:       Result Value    RBC 3.23 (*)     Hemoglobin 9.5 (*)     Hematocrit 30.2 (*)     MCHC 31.5 (*)     RDW 16.4 (*)     Gran # (ANC) 8.8 (*)     Lymph # 0.9 (*)     Mono # 1.1 (*)     Gran % 78.7 (*)     Lymph % 8.3 (*)     All other components within normal limits   COMPREHENSIVE METABOLIC PANEL - Abnormal; Notable for the following components:    CO2 18 (*)     Glucose 157 (*)     BUN 69 (*)     Creatinine 12.8 (*)     Albumin 2.9 (*)     ALT 8 (*)     eGFR 4.4 (*)     All other components within normal limits   TROPONIN I - Abnormal; Notable for the following components:    Troponin I 0.247 (*)     All other components within normal limits   B-TYPE NATRIURETIC PEPTIDE - Abnormal; Notable for the following components:    BNP 2,396 (*)     All other components within normal limits   TROPONIN ISTAT - Abnormal; Notable for the following components:    POC Cardiac Troponin I 0.18 (*)     All other components within normal limits   TROPONIN I   SARS-COV-2 RNA AMPLIFICATION, QUAL   POCT TROPONIN   POCT TROPONIN      EKG Readings: (Independently Interpreted)   Initial Reading: No STEMI. Previous EKG: Compared with most recent EKG Rhythm: Normal Sinus Rhythm. Heart Rate: 87.   J-point elevation in V5.  Left ventricular hypertrophy.   ECG Results              EKG 12-lead (Final result)  Result time 05/01/23 12:50:47      Final result by Interface, Lab In [facility] (05/01/23 12:50:47)                   Narrative:    Test Reason : R07.9,    Vent. Rate : 087 BPM     Atrial Rate : 087 BPM     P-R Int : 178 ms          QRS Dur : 090 ms      QT Int : 398 ms       P-R-T Axes : 082 080 -49 degrees     QTc Int : 478 ms    Normal sinus rhythm  LVH with repolarization abnormality ( Sokolow-Loepz , Garland product ,   Romhilt-Bolivar )  Abnormal ECG  When compared with ECG of 14-JAN-2023 18:17,  T wave inversion now evident in Inferior leads  Confirmed by Karissa Danielson MD ([id number]) on 5/1/2023 12:50:40 PM    Referred By:             Confirmed By:Karissa Danielson MD                                  Imaging Results              X-Ray Chest AP Portable (Final result)  Result time 05/01/23 12:25:48      Final result by Hamilton Kelly III, MD (05/01/23 12:25:48)                   Impression:      Pulmonary edema pneumonia aspiration sepsis or ARDS.      Electronically signed by: Hamilton Kelly MD  Date:    05/01/2023  Time:    12:25               Narrative:    EXAMINATION:  XR CHEST AP PORTABLE    CLINICAL HISTORY:  Chest Pain;    FINDINGS:  There is cardiomegaly, moderate edema, and worsening compared to 02/06/2023.                                       Medications   amLODIPine tablet 10 mg (10 mg Oral Given 5/1/23 1235)   carvediloL tablet 25 mg (25 mg Oral Given 5/1/23 1234)   hydrALAZINE tablet 25 mg (25 mg Oral Given 5/1/23 1235)   ondansetron injection 4 mg (4 mg Intravenous Not Given 5/1/23 1230)   guaiFENesin 100 mg/5 ml syrup 200 mg (has no administration in time range)   0.9%  NaCl infusion (has no administration in time range)   sodium  chloride 0.9% flush 5 mL (has no administration in time range)   melatonin tablet 6 mg (has no administration in time range)   polyethylene glycol packet 17 g (has no administration in time range)   bisacodyL suppository 10 mg (has no administration in time range)   acetaminophen tablet 650 mg (has no administration in time range)   acetaminophen tablet 650 mg (has no administration in time range)   HYDROcodone-acetaminophen 5-325 mg per tablet 1 tablet (has no administration in time range)   naloxone 0.4 mg/mL injection 0.02 mg (has no administration in time range)   glucagon (human recombinant) injection 1 mg (has no administration in time range)   dextrose 10% bolus 125 mL 125 mL (has no administration in time range)   dextrose 10% bolus 250 mL 250 mL (has no administration in time range)   dextrose 40 % gel 15,000 mg (has no administration in time range)   dextrose 40 % gel 30,000 mg (has no administration in time range)   heparin (porcine) injection 5,000 Units (has no administration in time range)   HYDROcodone-acetaminophen  mg per tablet 1 tablet (has no administration in time range)   ondansetron disintegrating tablet 8 mg (has no administration in time range)   ondansetron injection 4 mg (has no administration in time range)   simethicone chewable tablet 80 mg (has no administration in time range)   aluminum-magnesium hydroxide-simethicone 200-200-20 mg/5 mL suspension 30 mL (has no administration in time range)   hydrALAZINE injection 10 mg (has no administration in time range)   ferric citrate Tab 420 mg (has no administration in time range)   cinacalcet tablet 90 mg (has no administration in time range)   aspirin tablet 325 mg (325 mg Oral Given 5/1/23 1234)     Medical Decision Making:   Initial Assessment:   47-year-old female with extensive past medical history as described in HPI is presenting with chest pain and shortness of breath.  He is currently due for dialysis.  Who presents  hypertensive, otherwise normal vital signs.  Differential Diagnosis:   CHF exacerbation, fluid overload, ACS, doubt pneumonia  Clinical Tests:   Lab Tests: Reviewed and Ordered  Radiological Study: Ordered and Reviewed  Medical Tests: Reviewed and Ordered  ED Management:  See ED course.  Patient has elevated BNP.  Troponin also elevated but not significantly changed from baseline.  He is due for dialysis.  Consulted Nephrology and called to expedite dialysis once patient had increasing oxygen requirements while in the emergency department.  He continues to be hypotensive despite treatment with antihypertensives due to his fluid overload.  Again this was discussed with Nephrology to expedite dialysis.  She will be admitted to the hospital for continued monitoring and treatment.           ED Course as of 05/01/23 1505   Mon May 01, 2023   1331 BNP(!): 2,396 [KL]   1331 Troponin I(!): 0.247 [KL]   1331 Hemoglobin(!): 9.5  At baseline [KL]   1331 WBC: 11.17  No leukocytosis. [KL]   1400 Discussed with Nephrology to expedite dialysis [KL]      ED Course User Index  [KL] Mary Donaldson MD                 Clinical Impression:   Final diagnoses:  [R07.9] Chest pain  [R09.02] Hypoxia (Primary)  [E87.70] Hypervolemia, unspecified hypervolemia type  [R07.9] Chest pain, unspecified type  [N18.6] End stage renal disease        ED Disposition Condition    Observation                 Mary Donaldson MD  Resident  05/01/23 2633

## 2023-05-01 NOTE — HPI
Mr Dickinson is a 47 year old male with a PMHX of CAD, HTN, ESRD on HD MWF, currently being worked up for transplant, anemia of CKD, prior CVA, hyperparathyroidism who presents from HD clinic for shortness of breath and palpitations. SOB began yesterday, is worse with lying flat. It is associated with palpitations, a dry cough, and one episode of nausea and vomiting yesterday. He has never experienced palpitations before. He denies chest pain, wheezing, chest tightness, left arm pain, jaw pain, Le edema, abdominal distension, abdominal pain, current N/V, C/D, numbness, focal weakness. Patient endorses dietary non-compliance with fried chicken yesterday. He does not take his blood pressure at home but reports compliance with home medications. He does not make urine. Last HD session Friday. EDW 72.6 kg.    In ED, /107, 91% on 3L, P 92. Afebrile without leukocytosis. CXR with pulmonary edema. BNP 2,396. Troponin 0.247,(0.3 last year) EKG with TWI in inferior leads. Nephrology consulted for HD. Patient given hydralazine 25 mg PO, amlodipine 10 mg, and  and admitted to observation for acute respiratory failure 2/2 hypervolemia.

## 2023-05-01 NOTE — ASSESSMENT & PLAN NOTE
- continue home amlodipine, coreg, hydralazine  - uncontrolled BP in setting of hypervolemia  - may need medication uptitration if still uncontrolled after HD  - cardiac diet  - hydralazine PRN SBP>180, DBP>110

## 2023-05-01 NOTE — H&P
[facility] - Emergency Dept  [facility] Medicine  History & Physical    Patient Name: Haroldo Dickinson  MRN: [id number]  Patient Class: OP- Observation  Admission Date: 5/1/2023  Attending Physician: Apple Greene MD   Primary Care Provider: Tess Ledbetter MD         Patient information was obtained from patient and ER records.     Subjective:     Principal Problem:Acute respiratory failure with hypoxia    Chief Complaint:   Chief Complaint   Patient presents with    Chest Pain     Was at [facility] for Dialysis today, but refused d/t L chest pain that only exists when coughing. Pain is now 0/10        HPI: Mr Dickinson is a 47 year old male with a PMHX of CAD, HTN, ESRD on HD MWF, currently being worked up for transplant, anemia of CKD, prior CVA, hyperparathyroidism who presents from HD clinic for shortness of breath and palpitations. SOB began yesterday, is worse with lying flat. It is associated with palpitations, a dry cough, and one episode of nausea and vomiting yesterday. He has never experienced palpitations before. He denies chest pain, wheezing, chest tightness, left arm pain, jaw pain, Le edema, abdominal distension, abdominal pain, current N/V, C/D, numbness, focal weakness. Patient endorses dietary non-compliance with fried chicken yesterday. He does not take his blood pressure at home but reports compliance with home medications. He does not make urine. Last HD session Friday. EDW 72.6 kg.    In ED, /107, 91% on 3L, P 92. Afebrile without leukocytosis. CXR with pulmonary edema. BNP 2,396. Troponin 0.247,(0.3 last year) EKG with TWI in inferior leads. Nephrology consulted for HD. Patient given hydralazine 25 mg PO, amlodipine 10 mg, and  and admitted to observation for acute respiratory failure 2/2 hypervolemia.       Past Medical History:   Diagnosis Date    CAD (coronary artery disease), native coronary artery 11/21/2019    Cataract     Diabetes mellitus     Diabetic retinopathy     Dialysis  patient     DM type 2 causing renal disease, not at goal     ESRD (end stage renal disease) started dialysis 01/2014 06/05/2014    Hyperparathyroidism, secondary renal 06/05/2014    Hypertension     NSTEMI (non-ST elevated myocardial infarction) 12/21/2013    Organ transplant candidate 06/05/2014    Pneumonia     Renal cell carcinoma of right kidney     Renal hypertension     Stroke        Past Surgical History:   Procedure Laterality Date    ANGIOGRAM, CORONARY, WITH LEFT HEART CATHETERIZATION N/A 7/1/2021    Procedure: Angiogram, Coronary, with Left Heart Cath;  Surgeon: Miki Zendejas MD;  Location: [facility] CATH LAB;  Service: Cardiology;  Laterality: N/A;    COLONOSCOPY N/A 5/3/2017    Procedure: COLONOSCOPY;  Surgeon: Rufino Carpenter MD;  Location: [facility] ENDO (4TH FLR);  Service: Endoscopy;  Laterality: N/A;  pt states can only schedule on Wednesdays    COLONOSCOPY N/A 2/9/2021    Procedure: COLONOSCOPY;  Surgeon: Edil Wong MD;  Location: [facility] ENDO (4TH FLR);  Service: Endoscopy;  Laterality: N/A;  Dialysis MWF/ labwork day of procedure  right arm aceess  per Dr. Colin pt can hold Plavix 5 days prior see note- sm  COVID test on 2/6/21 [facility] - sm    COLONOSCOPY N/A 2/10/2021    Procedure: COLONOSCOPY;  Surgeon: Robert Ayers MD;  Location: [facility] (4TH FLR);  Service: Endoscopy;  Laterality: N/A;  rescheduled due to poor bowel prep-BB  negative covid screening 2/6/21-BB  dialysis M-W-F-BB  okay to r/s for 2/9/21 and to hold Plavix per Dr. KIRAN Wong-BB  labs same day-BB    CORONARY STENT PLACEMENT N/A 7/25/2019    Procedure: INSERTION, STENT, CORONARY ARTERY;  Surgeon: Miki Zendejas MD;  Location: [facility] CATH LAB;  Service: Cardiology;  Laterality: N/A;    DIALYSIS FISTULA CREATION      FISTULOGRAM N/A 2/18/2019    Procedure: Fistulogram;  Surgeon: Yaneth De La Cruz MD;  Location: [facility] CATH LAB;  Service: Cardiology;  Laterality: N/A;    FISTULOGRAM Right 7/23/2019    Procedure:  Fistulogram;  Surgeon: Oz Cordoba MD;  Location: [facility] CATH LAB;  Service: Cardiology;  Laterality: Right;    LAPAROSCOPIC ROBOT-ASSISTED SURGICAL REMOVAL OF KIDNEY USING DA JAIME XI Right 5/19/2022    Procedure: XI ROBOTIC NEPHRECTOMY;  Surgeon: Aidan Hendricks MD;  Location: [facility] OR [facility] FLR;  Service: Urology;  Laterality: Right;  3hrs    LAPAROSCOPIC ROBOT-ASSISTED SURGICAL REMOVAL OF KIDNEY USING DA JAIME XI Left 1/5/2023    Procedure: XI ROBOTIC NEPHRECTOMY;  Surgeon: Aidan Hendricks MD;  Location: [facility] OR [facility] FLR;  Service: Urology;  Laterality: Left;  4 hrs    LEFT HEART CATHETERIZATION Left 7/25/2019    Procedure: Left heart cath;  Surgeon: Miki Zendejas MD;  Location: [facility] CATH LAB;  Service: Cardiology;  Laterality: Left;    REPAIR  1/5/2023    Procedure: REPAIR; COLOTOMY;  Surgeon: Maikel Lamb MD;  Location: [facility] OR [address] [place] [address];  Service: General;;    RETINAL LASER PROCEDURE Bilateral 2018 or 2017    Dr. Rothman    VASCULAR SURGERY         Review of patient's allergies indicates:   Allergen Reactions    No known allergies        Current Facility-Administered Medications on File Prior to Encounter   Medication    lidocaine (PF) 10 mg/ml (1%) injection 10 mg     Current Outpatient Medications on File Prior to Encounter   Medication Sig    amLODIPine (NORVASC) 10 MG tablet Take 1 tablet (10 mg total) by mouth once daily.    aspirin (ECOTRIN) 81 MG EC tablet Take 1 tablet (81 mg total) by mouth once daily.    AURYXIA 210 mg iron Tab Take 420 mg by mouth 3 (three) times daily with meals. Take 1 with snacks    carvediloL (COREG) 25 MG tablet TAKE 1 TABLET BY MOUTH TWICE A DAY WITH FOOD    cinacalcet (SENSIPAR) 90 MG Tab Take 1 tablet by mouth once daily.    hydrALAZINE (APRESOLINE) 25 MG tablet Take 1 tablet (25 mg total) by mouth every 8 (eight) hours. for SBP > 140mm HG    OPW TEST CLAIM - DO NOT FILL OPW test claim. Do not fill. (Patient not taking: Reported on 2/14/2023)     polyethylene glycol (GLYCOLAX) 17 gram/dose powder Use cap to measure 17 g, then mix in liquid and drink by mouth once daily.    sevelamer carbonate (RENVELA) 800 mg Tab Take 800 mg by mouth 3 (three) times daily with meals.    [DISCONTINUED] oxyCODONE (ROXICODONE) 5 MG immediate release tablet Take 2 tablets (10 mg total) by mouth every 4 (four) hours as needed for Pain.     Family History       Problem Relation (Age of Onset)    Cancer Maternal Grandfather    Cataracts Mother    Diabetes Mother    Heart disease Brother    Hypertension Mother, Father, Sister, Brother    Kidney disease Brother          Tobacco Use    Smoking status: Never    Smokeless tobacco: Never   Substance and Sexual Activity    Alcohol use: Not Currently     Comment: One drink a month    Drug use: No    Sexual activity: Yes     Partners: Female     Birth control/protection: None     Review of Systems   Constitutional:  Positive for fatigue. Negative for chills, diaphoresis and fever.   HENT:  Negative for congestion and rhinorrhea.    Respiratory:  Positive for cough and shortness of breath. Negative for chest tightness and wheezing.    Cardiovascular:  Positive for palpitations. Negative for chest pain and leg swelling.   Gastrointestinal:  Negative for abdominal distention, abdominal pain, constipation, diarrhea, nausea and vomiting.   Genitourinary:  Positive for decreased urine volume (ESRD).   Musculoskeletal:  Negative for arthralgias, back pain and myalgias.   Skin:  Negative for color change and pallor.   Neurological:  Negative for dizziness, syncope, weakness, numbness and headaches.   Psychiatric/Behavioral:  Negative for agitation, behavioral problems and confusion.    Objective:     Vital Signs (Most Recent):  Temp: 97.4 °F (36.3 °C) (05/01/23 1128)  Pulse: 85 (05/01/23 1427)  Resp: (!) 22 (05/01/23 1427)  BP: (!) 221/107 (05/01/23 1234)  SpO2: 95 % (05/01/23 1427)   Vital Signs (24h Range):  Temp:  [97.4 °F (36.3 °C)]  97.4 °F (36.3 °C)  Pulse:  [85-93] 85  Resp:  [18-22] 22  SpO2:  [91 %-97 %] 95 %  BP: (203-221)/(100-107) 221/107     Weight: 76.2 kg (168 lb)  Body mass index is 25.54 kg/m².    Physical Exam  Vitals and nursing note reviewed.   Constitutional:       General: He is not in acute distress.     Appearance: Normal appearance. He is normal weight. He is ill-appearing.   HENT:      Head: Normocephalic and atraumatic.      Nose: Nose normal. No congestion.      Mouth/Throat:      Mouth: Mucous membranes are moist.      Pharynx: Oropharynx is clear.   Eyes:      Extraocular Movements: Extraocular movements intact.      Conjunctiva/sclera: Conjunctivae normal.      Pupils: Pupils are equal, round, and reactive to light.   Cardiovascular:      Rate and Rhythm: Normal rate and regular rhythm.      Pulses: Normal pulses.      Heart sounds: Murmur heard.   Pulmonary:      Breath sounds: No stridor. Rales present. No wheezing or rhonchi.      Comments: On 15L NRB satting 95%  Able to speak in full sentences  Chest:      Chest wall: No tenderness.   Abdominal:      General: Abdomen is flat. Bowel sounds are normal. There is no distension.      Palpations: Abdomen is soft.      Tenderness: There is no abdominal tenderness. There is no guarding or rebound.   Musculoskeletal:         General: No swelling or tenderness. Normal range of motion.      Right lower leg: No edema.      Left lower leg: No edema.   Skin:     General: Skin is warm and dry.   Neurological:      General: No focal deficit present.      Mental Status: He is alert and oriented to person, place, and time. Mental status is at baseline.      Cranial Nerves: No cranial nerve deficit.      Motor: No weakness.   Psychiatric:         Mood and Affect: Mood normal.         Behavior: Behavior normal.         Thought Content: Thought content normal.         Judgment: Judgment normal.         CRANIAL NERVES     CN III, IV, VI   Pupils are equal, round, and reactive to  light.     Significant Labs: All pertinent labs within the past 24 hours have been reviewed.  Blood Culture: No results for input(s): LABBLOO in the last 48 hours.  CBC:   Recent Labs   Lab 05/01/23  1226   WBC 11.17   HGB 9.5*   HCT 30.2*        CMP:   Recent Labs   Lab 05/01/23  1226      K 4.4      CO2 18*   *   BUN 69*   CREATININE 12.8*   CALCIUM 9.6   PROT 6.9   ALBUMIN 2.9*   BILITOT 0.9   ALKPHOS 86   AST 10   ALT 8*   ANIONGAP 12     Magnesium: No results for input(s): MG in the last 48 hours.  POCT Glucose: No results for input(s): POCTGLUCOSE in the last 48 hours.    Significant Imaging: I have reviewed all pertinent imaging results/findings within the past 24 hours.    Assessment/Plan:     * Acute respiratory failure with hypoxia  Hypervolemia in ESRD  ESRD on HD  Palpitations  HTN urgency  CKD bone mineral disease  2/2 hyperparathyroidism    Patient with Hypoxic Respiratory failure which is Acute.  he is not on home oxygen. Supplemental oxygen was provided and noted- 15L      .   Signs/symptoms of respiratory failure include- tachypnea and wheezing. Contributing diagnoses includes - Pleural effusion and hypervolemia from ESRD Labs and images were reviewed. Patient Has not had a recent ABG. Will treat underlying causes and adjust management of respiratory failure as follows-    Patient sent from HD for SOB and palpitations. Hypervolemic on admit in setting of increased salt intake yesterday. EDW 72.6, weight on admission 76.2.     - Hypertensive urgency with /107, given home meds in ED  - Nephrology consulted for urgent HD   - CXR with pulmonary edema  - currently on 15L NRB  - continue home BP medications   - continue home sensipar  - cardiac/ renal diet,1.2L fluid restriction  - re-evaluate for BP titration post HD  - wean O2 as tolerated  - tele    Palpitations  - patient reports palpitations associated with SOB  - EKG with NSR  - tele    Anemia in ESRD (end-stage renal  disease)  - Hgb 9.5 on admit, at baseline  - continue to monitor    Elevated troponin  CAD  - troponin 0.2, down from 0.3 (6/22), will complete trend  - EKG with NSR, TWI in inferior lead  - denies chest pain  - do not suspect ACS at this time  - if uptrends suspect 2/2 to hypertensive urgency and hypervolemia  - will get TTE  - has outpatient cardiac pet at end of July. May be able to move it up  - not currently on statin, will order lipid panel in am  - continue home asa  - tele      Essential hypertension  - continue home amlodipine, coreg, hydralazine  - uncontrolled BP in setting of hypervolemia  - may need medication uptitration if still uncontrolled after HD  - cardiac diet  - hydralazine PRN SBP>180, DBP>110    Hemiparesis affecting right side as late effect of cerebrovascular accident  - continue home ASA, not on a statin      VTE Risk Mitigation (From admission, onward)         Ordered     heparin (porcine) injection 5,000 Units  Every 8 hours         05/01/23 1413     IP VTE HIGH RISK PATIENT  Once         05/01/23 1413     Place sequential compression device  Until discontinued         05/01/23 1413                     On 05/01/2023, patient should be placed in hospital observation services under my care in collaboration with Dr Greene.      DANNI WarrenC  Department of Hospital Medicine  [facility] - Emergency Dept

## 2023-05-01 NOTE — ED TRIAGE NOTES
Pt presents to ED via EMS. Pt endorses chest pain beginning yesterday when coughing, reports dry cough since Sunday. Per EMS pt received nitro prior to arrival. Pt currently on 2L NC, endorses SOB without oxygen. Denies home O2 use. Pt is on dialysis on MWF at [facility], did not go to dialysis today with most recent treatment received on Friday. Pt currently hypertensive.

## 2023-05-01 NOTE — ASSESSMENT & PLAN NOTE
47 y.o. Black or  Male ESRD-HD M-W-F presents to ED on 5/1/2023 with diagnosis of: Hypoxia [R09.02]   Nephrology consulted for inpatient ESRD-HD management    Outpatient HD Information:  -Dialysis modality: Hemodialysis  -Outpatient HD unit: [facility]   -Nephrologist: ?  -HD TX days: Monday/Wednesday/Friday, duration of treatment: 3-4 hrs  -Dialysis access: RUE AV fistula   -Residual urine: Anuric   -EDW: 72.4 kg     Assessment:   - Will provide emergent dialysis today (05/01/2023) with UF -3-3.5 L as BP tolerates for metabolic clearance and volume management. Hypervolemic on imaging and CXR requiring supplemental oxygen.   - Labs reviewed and dialysate to be adjusted to current labs.   - Will obtain OP dialysis records  - Continue to monitor intake and output  - Please avoid gadolinium, fleets, phos-based laxatives, NSAIDs  - Dialysis thrice weekly unless more urgent indications arise. Will evaluate RRT requirements Daily.    Anemia of ESRD   Recent Labs   Lab 05/01/23  1226   WBC 11.17   HGB 9.5*   HCT 30.2*        Lab Results   Component Value Date    FESATURATED 17 (L) 12/16/2013    FERRITIN 374 (H) 12/16/2013       - Goal in ESRD is Hgb of 10-11.   - Hgb 9.5., close to target, no emergent need for Epogen.   - EPO can be administered and dosed per his OP unit upon discharge.    Mineral Bone Disease in ESRD   Lab Results   Component Value Date    .6 (H) 10/20/2022    CALCIUM 9.6 05/01/2023    PHOS 5.2 (H) 01/16/2023       - F/U PO4, Mg, Calcium. And albumin levels.   - Renal diet with protein intake goal 1.5 g/kg/d with 1 L fluid restriction   - Novasource with meals  - Continue home phos binder   - Daily renal panel so that phos and albumin is monitored daily.

## 2023-05-01 NOTE — CONSULTS
[facility]  Nephrology  Consult Note    Patient Name: Haroldo Dickinson  MRN: [id number]  Admission Date: 5/1/2023  Hospital Length of Stay: 0 days  Attending Provider: Apple Greene MD   Primary Care Physician: Tess Ledbetter MD  Principal Problem:Acute respiratory failure with hypoxia    Inpatient consult to Nephrology  Consult performed by: Avis Mancilla, GISSELL, FNP-C  Consult ordered by: Mary Donaldson MD  Reason for consult: ESRD        Subjective:     HPI: 47 y.o. Black or  Male with multiple co morbidities including CAD and ESRD 2/2 HTN on HD and dialyzes MWF who presents to [facility] with complaints of chest pain x 1 day. Patient received nitro prior to arrival with some improvement. Troponin 0.247. BNP 2396. CXR with evidence moderate pulmonary edema. Last HD on Friday. /100. Patient with complaints of shortness of breath and CP on exam. Currently on NC. Nephrology consulted for management of ESRD and HD treatment.      Past Medical History:   Diagnosis Date    CAD (coronary artery disease), native coronary artery 11/21/2019    Cataract     Diabetes mellitus     Diabetic retinopathy     Dialysis patient     DM type 2 causing renal disease, not at goal     ESRD (end stage renal disease) started dialysis 01/2014 06/05/2014    Hyperparathyroidism, secondary renal 06/05/2014    Hypertension     NSTEMI (non-ST elevated myocardial infarction) 12/21/2013    Organ transplant candidate 06/05/2014    Pneumonia     Renal cell carcinoma of right kidney     Renal hypertension     Stroke        Past Surgical History:   Procedure Laterality Date    ANGIOGRAM, CORONARY, WITH LEFT HEART CATHETERIZATION N/A 7/1/2021    Procedure: Angiogram, Coronary, with Left Heart Cath;  Surgeon: Miki Zendejas MD;  Location: [facility] CATH LAB;  Service: Cardiology;  Laterality: N/A;    COLONOSCOPY N/A 5/3/2017    Procedure: COLONOSCOPY;  Surgeon: Rufino Carpenter MD;  Location: [facility] ENDO (4TH FLR);   Service: Endoscopy;  Laterality: N/A;  pt states can only schedule on Wednesdays    COLONOSCOPY N/A 2/9/2021    Procedure: COLONOSCOPY;  Surgeon: Edil Wong MD;  Location: [facility] ENDO (4TH FLR);  Service: Endoscopy;  Laterality: N/A;  Dialysis MWF/ labwork day of procedure  right arm aceess  per Dr. Colin pt can hold Plavix 5 days prior see note- sm  COVID test on 2/6/21 at [facility] -     COLONOSCOPY N/A 2/10/2021    Procedure: COLONOSCOPY;  Surgeon: Robert Ayers MD;  Location: [facility] ENDO (4TH FLR);  Service: Endoscopy;  Laterality: N/A;  rescheduled due to poor bowel prep-BB  negative covid screening 2/6/21-BB  dialysis M-W-F-BB  okay to r/s for 2/9/21 and to hold Plavix per Dr. KIRAN Wong-BB  labs same day-BB    CORONARY STENT PLACEMENT N/A 7/25/2019    Procedure: INSERTION, STENT, CORONARY ARTERY;  Surgeon: Miki Zendejas MD;  Location: [facility] CATH LAB;  Service: Cardiology;  Laterality: N/A;    DIALYSIS FISTULA CREATION      FISTULOGRAM N/A 2/18/2019    Procedure: Fistulogram;  Surgeon: Yaneth De La Cruz MD;  Location: [facility] CATH LAB;  Service: Cardiology;  Laterality: N/A;    FISTULOGRAM Right 7/23/2019    Procedure: Fistulogram;  Surgeon: Oz Cordoba MD;  Location: [facility] CATH LAB;  Service: Cardiology;  Laterality: Right;    LAPAROSCOPIC ROBOT-ASSISTED SURGICAL REMOVAL OF KIDNEY USING DA JAIME XI Right 5/19/2022    Procedure: XI ROBOTIC NEPHRECTOMY;  Surgeon: Aidan Hendricks MD;  Location: [address];  Service: Urology;  Laterality: Right;  3hrs    LAPAROSCOPIC ROBOT-ASSISTED SURGICAL REMOVAL OF KIDNEY USING DA JAIME XI Left 1/5/2023    Procedure: XI ROBOTIC NEPHRECTOMY;  Surgeon: Aidan Hendricks MD;  Location: [address];  Service: Urology;  Laterality: Left;  4 hrs    LEFT HEART CATHETERIZATION Left 7/25/2019    Procedure: Left heart cath;  Surgeon: Miki Zendejas MD;  Location: [facility] CATH LAB;  Service: Cardiology;  Laterality: Left;    REPAIR  1/5/2023    Procedure:  REPAIR; COLOTOMY;  Surgeon: Maikel Lamb MD;  Location: [facility] OR [address] [place] [address];  Service: General;;    RETINAL LASER PROCEDURE Bilateral 2018 or 2017    Dr. Rothman    VASCULAR SURGERY         Review of patient's allergies indicates:   Allergen Reactions    No known allergies      Current Facility-Administered Medications   Medication Frequency    0.9%  NaCl infusion Once    acetaminophen tablet 650 mg Q8H PRN    acetaminophen tablet 650 mg Q4H PRN    aluminum-magnesium hydroxide-simethicone 200-200-20 mg/5 mL suspension 30 mL QID PRN    amLODIPine tablet 10 mg Daily    bisacodyL suppository 10 mg Daily PRN    carvediloL tablet 25 mg BID WM    dextrose 10% bolus 125 mL 125 mL PRN    dextrose 10% bolus 250 mL 250 mL PRN    dextrose 40 % gel 15,000 mg PRN    dextrose 40 % gel 30,000 mg PRN    glucagon (human recombinant) injection 1 mg PRN    guaiFENesin 100 mg/5 ml syrup 200 mg ED 1 Time    heparin (porcine) injection 5,000 Units Q8H    hydrALAZINE injection 10 mg Q8H PRN    hydrALAZINE tablet 25 mg Q8H    HYDROcodone-acetaminophen  mg per tablet 1 tablet Q6H PRN    HYDROcodone-acetaminophen 5-325 mg per tablet 1 tablet Q6H PRN    melatonin tablet 6 mg Nightly PRN    naloxone 0.4 mg/mL injection 0.02 mg PRN    ondansetron disintegrating tablet 8 mg Q8H PRN    ondansetron injection 4 mg ED 1 Time    ondansetron injection 4 mg Q8H PRN    polyethylene glycol packet 17 g Daily    simethicone chewable tablet 80 mg QID PRN    sodium chloride 0.9% flush 5 mL PRN     Current Outpatient Medications   Medication    amLODIPine (NORVASC) 10 MG tablet    aspirin (ECOTRIN) 81 MG EC tablet    AURYXIA 210 mg iron Tab    carvediloL (COREG) 25 MG tablet    cinacalcet (SENSIPAR) 90 MG Tab    hydrALAZINE (APRESOLINE) 25 MG tablet    OPW TEST CLAIM - DO NOT FILL    oxyCODONE (ROXICODONE) 5 MG immediate release tablet    polyethylene glycol (GLYCOLAX) 17 gram/dose powder    sevelamer carbonate  (RENVELA) 800 mg Tab     Facility-Administered Medications Ordered in Other Encounters   Medication Frequency    lidocaine (PF) 10 mg/ml (1%) injection 10 mg Once     Family History       Problem Relation (Age of Onset)    Cancer Maternal Grandfather    Cataracts Mother    Diabetes Mother    Heart disease Brother    Hypertension Mother, Father, Sister, Brother    Kidney disease Brother          Tobacco Use    Smoking status: Never    Smokeless tobacco: Never   Substance and Sexual Activity    Alcohol use: Not Currently     Comment: One drink a month    Drug use: No    Sexual activity: Yes     Partners: Female     Birth control/protection: None     Review of Systems   Constitutional:  Negative for activity change, appetite change, fatigue and fever.   HENT:  Negative for congestion and hearing loss.    Eyes:  Negative for visual disturbance.   Respiratory:  Positive for shortness of breath. Negative for cough and chest tightness.    Cardiovascular:  Positive for chest pain. Negative for palpitations and leg swelling.   Gastrointestinal:  Negative for abdominal distention, abdominal pain, blood in stool, constipation, diarrhea, nausea and vomiting.   Genitourinary:  Positive for decreased urine volume. Negative for dysuria and urgency.   Musculoskeletal:  Negative for gait problem.   Skin:  Negative for wound.   Allergic/Immunologic: Negative.    Neurological:  Negative for dizziness, weakness and headaches.   Psychiatric/Behavioral:  Negative for agitation, behavioral problems, confusion and decreased concentration.    Objective:     Vital Signs (Most Recent):  Temp: 97.4 °F (36.3 °C) (05/01/23 1128)  Pulse: 85 (05/01/23 1427)  Resp: (!) 22 (05/01/23 1427)  BP: (!) 221/107 (05/01/23 1234)  SpO2: 95 % (05/01/23 1427)   Vital Signs (24h Range):  Temp:  [97.4 °F (36.3 °C)] 97.4 °F (36.3 °C)  Pulse:  [85-93] 85  Resp:  [18-22] 22  SpO2:  [91 %-97 %] 95 %  BP: (203-221)/(100-107) 221/107     Weight: 76.2 kg (168 lb)  (05/01/23 1128)  Body mass index is 25.54 kg/m².  Body surface area is 1.91 meters squared.    No intake/output data recorded.    Physical Exam  Vitals and nursing note reviewed.   Constitutional:       General: He is not in acute distress.     Appearance: He is well-developed. He is ill-appearing and toxic-appearing.   HENT:      Head: Normocephalic and atraumatic.      Right Ear: Hearing and external ear normal.      Left Ear: Hearing and external ear normal.      Mouth/Throat:      Mouth: Mucous membranes are moist.   Eyes:      General: No scleral icterus.     Conjunctiva/sclera: Conjunctivae normal.   Cardiovascular:      Rate and Rhythm: Normal rate and regular rhythm.      Pulses: Normal pulses.      Heart sounds: Normal heart sounds.      Arteriovenous access: Right arteriovenous access is present.  Pulmonary:      Effort: Pulmonary effort is normal. No respiratory distress.      Breath sounds: Wheezing and rales present.   Abdominal:      General: Bowel sounds are normal. There is no distension.      Palpations: Abdomen is soft.      Tenderness: There is no abdominal tenderness.   Musculoskeletal:         General: No swelling. Normal range of motion.      Cervical back: Normal range of motion.      Right lower leg: No edema.      Left lower leg: No edema.   Skin:     General: Skin is warm and dry.   Neurological:      General: No focal deficit present.      Mental Status: He is alert and oriented to person, place, and time. Mental status is at baseline.   Psychiatric:         Mood and Affect: Mood normal.         Behavior: Behavior normal. Behavior is cooperative.         Thought Content: Thought content normal.         Judgment: Judgment normal.       Significant Labs:  CBC:   Recent Labs   Lab 05/01/23  1226   WBC 11.17   RBC 3.23*   HGB 9.5*   HCT 30.2*      MCV 94   MCH 29.4   MCHC 31.5*     CMP:   Recent Labs   Lab 05/01/23  1226   *   CALCIUM 9.6   ALBUMIN 2.9*   PROT 6.9      K  4.4   CO2 18*      BUN 69*   CREATININE 12.8*   ALKPHOS 86   ALT 8*   AST 10   BILITOT 0.9     All labs within the past 24 hours have been reviewed.    Assessment/Plan:     Pulmonary  * Acute respiratory failure with hypoxia  - Emergent HD today for volume removal. Target UF 3 - 3.5 L as tolerated, keep MAP >65.    Cardiac/Vascular  Essential hypertension  -200s mm Hg.     - HD will help with volume removal and HTN management.  - Restart home anti hypertensive regimen  - No lab stick or BP intake on access site.      Renal/  End stage renal failure on dialysis  47 y.o. Black or  Male ESRD-HD M-W-F presents to ED on 5/1/2023 with diagnosis of: Hypoxia [R09.02]   Nephrology consulted for inpatient ESRD-HD management    Outpatient HD Information:  -Dialysis modality: Hemodialysis  -Outpatient HD unit: [facility]   -Nephrologist: ?  -HD TX days: Monday/Wednesday/Friday, duration of treatment: 3-4 hrs  -Dialysis access: RUE AV fistula   -Residual urine: Anuric   -EDW: 72.4 kg     Assessment:   - Will provide emergent dialysis today (05/01/2023) with UF -3-3.5 L as BP tolerates for metabolic clearance and volume management. Hypervolemic on imaging and CXR requiring supplemental oxygen.   - Labs reviewed and dialysate to be adjusted to current labs.   - Will obtain OP dialysis records  - Continue to monitor intake and output  - Please avoid gadolinium, fleets, phos-based laxatives, NSAIDs  - Dialysis thrice weekly unless more urgent indications arise. Will evaluate RRT requirements Daily.    Anemia of ESRD   Recent Labs   Lab 05/01/23  1226   WBC 11.17   HGB 9.5*   HCT 30.2*        Lab Results   Component Value Date    FESATURATED 17 (L) 12/16/2013    FERRITIN 374 (H) 12/16/2013       - Goal in ESRD is Hgb of 10-11.   - Hgb 9.5., close to target, no emergent need for Epogen.   - EPO can be administered and dosed per his OP unit upon discharge.    Mineral Bone Disease in ESRD   Lab  Results   Component Value Date    .6 (H) 10/20/2022    CALCIUM 9.6 05/01/2023    PHOS 5.2 (H) 01/16/2023       - F/U PO4, Mg, Calcium. And albumin levels.   - Renal diet with protein intake goal 1.5 g/kg/d with 1 L fluid restriction   - Novasource with meals  - Continue home phos binder   - Daily renal panel so that phos and albumin is monitored daily.           Thank you for your consult. I will follow-up with patient. Please contact us if you have any additional questions.    Avis Mancilla DNP, FNP-C  Nephrology  [facility]

## 2023-05-01 NOTE — PROGRESS NOTES
Pt AAO, VSS, NAD. HD started to right arm fistula in emergency room. Pt listed as observation pt. NAD

## 2023-05-01 NOTE — PROVIDER PROGRESS NOTES - EMERGENCY DEPT.
Encounter Date: 5/1/2023    ED Physician Progress Notes          Physician Attestation Statement for Resident:  As the supervising MD   Physician Attestation Statement: I have personally seen and examined this patient.       I agree with the history unless otherwise noted.     As the supervising MD I agree with the PE unless otherwise noted.      I have reviewed and agree with the residents interpretation of the following unless otherwise noted:   I have personally reviewed and interpreted the patients laboratory studies:  Mild anemia,   I have personally reviewed and interpreted imaging studies: + diffuse pulmonary edema  I have personally reviewed and interpreted the patient's EKG:  Changes consistent with LVH pattern      I have also reviewed the following:     old EKGs,   old imaging and results    As the supervising MD I agree with the treatment, course, plan, and disposition unless otherwise noted.    Patient's presentation is concerning for acute volume overload - discussed with Nephrology and will plan for emergent hemodialysis    Critical Care   Date: 05/01/2023  Performed by: Soila Escalera MD   Authorized by: Soila Escalera MD      Total critical care time (exclusive of procedural time) : 35 minutes, exclusive of separately billable procedures and treating other patients and teaching time.    Critical care was necessary to treat or prevent imminent or life-threatening deterioration of the following conditions:  volume overload    Due to a high probability of clinically significant, life threatening deterioration, the patient required my highest level of preparedness to intervene emergently and I personally spent this critical care time directly and personally managing the patient. This critical care time included obtaining a history; examining the patient; pulse oximetry; ordering and review of studies; arranging urgent treatment with development of a management plan; evaluation of patient's response to  treatment; frequent reassessment, documentation, and, discussions with other providers, ***patient and ***family members.

## 2023-05-01 NOTE — ASSESSMENT & PLAN NOTE
CAD  - troponin 0.2, down from 0.3 (6/22), will complete trend  - EKG with NSR, TWI in inferior lead  - denies chest pain  - do not suspect ACS at this time  - if uptrends suspect 2/2 to hypertensive urgency and hypervolemia  - will get TTE  - has outpatient cardiac pet at end of July. May be able to move it up  - not currently on statin, will order lipid panel in am  - continue home asa  - tele

## 2023-05-01 NOTE — ED NOTES
Pt changed into gown, connected to continuous cardiac monitor, BP cuff, and pulse ox. Respirations even and unlabored on 2L NC. Denies current SOB, endorses chest pain when coughing. Pt requesting cough medicine for current dry cough he has had since Sunday.

## 2023-05-01 NOTE — ASSESSMENT & PLAN NOTE
-200s mm Hg.     - HD will help with volume removal and HTN management.  - Restart home anti hypertensive regimen  - No lab stick or BP intake on access site.

## 2023-05-01 NOTE — HPI
47 y.o. Black or  Male with multiple co morbidities including CAD and ESRD 2/2 HTN on HD and dialyzes MWF who presents to [facility] with complaints of chest pain x 1 day. Patient received nitro prior to arrival with some improvement. Troponin 0.247. BNP 2396. CXR with evidence moderate pulmonary edema. Last HD on Friday. /100. Patient with complaints of shortness of breath and CP on exam. Currently on NC. Nephrology consulted for management of ESRD and HD treatment.

## 2023-05-01 NOTE — ASSESSMENT & PLAN NOTE
Hypervolemia in ESRD  ESRD on HD  Palpitations  HTN urgency  CKD bone mineral disease  2/2 hyperparathyroidism    Patient with Hypoxic Respiratory failure which is Acute.  he is not on home oxygen. Supplemental oxygen was provided and noted- 15L      .   Signs/symptoms of respiratory failure include- tachypnea and wheezing. Contributing diagnoses includes - Pleural effusion and hypervolemia from ESRD Labs and images were reviewed. Patient Has not had a recent ABG. Will treat underlying causes and adjust management of respiratory failure as follows-    Patient sent from HD for SOB and palpitations. Hypervolemic on admit in setting of increased salt intake yesterday. EDW 72.6, weight on admission 76.2.     - Hypertensive urgency with /107, given home meds in ED  - Nephrology consulted for urgent HD   - CXR with pulmonary edema  - currently on 15L NRB  - continue home BP medications   - continue home sensipar  - cardiac/ renal diet,1.2L fluid restriction  - re-evaluate for BP titration post HD  - wean O2 as tolerated  - tele

## 2023-05-01 NOTE — SUBJECTIVE & OBJECTIVE
Past Medical History:   Diagnosis Date    CAD (coronary artery disease), native coronary artery 11/21/2019    Cataract     Diabetes mellitus     Diabetic retinopathy     Dialysis patient     DM type 2 causing renal disease, not at goal     ESRD (end stage renal disease) started dialysis 01/2014 06/05/2014    Hyperparathyroidism, secondary renal 06/05/2014    Hypertension     NSTEMI (non-ST elevated myocardial infarction) 12/21/2013    Organ transplant candidate 06/05/2014    Pneumonia     Renal cell carcinoma of right kidney     Renal hypertension     Stroke        Past Surgical History:   Procedure Laterality Date    ANGIOGRAM, CORONARY, WITH LEFT HEART CATHETERIZATION N/A 7/1/2021    Procedure: Angiogram, Coronary, with Left Heart Cath;  Surgeon: Miki Zendejas MD;  Location: [facility] CATH LAB;  Service: Cardiology;  Laterality: N/A;    COLONOSCOPY N/A 5/3/2017    Procedure: COLONOSCOPY;  Surgeon: Rufino Carpenter MD;  Location: [facility] ([address] [place] [address]);  Service: Endoscopy;  Laterality: N/A;  pt states can only schedule on Wednesdays    COLONOSCOPY N/A 2/9/2021    Procedure: COLONOSCOPY;  Surgeon: Edil Wong MD;  [facility]  Service: Endoscopy;  Laterality: N/A;  Dialysis MWF/ labwork day of procedure  right arm aceess  per Dr. Colin pt can hold Plavix 5 days prior see note- sm  COVID test on 2/6/21 at [facility] - sm    COLONOSCOPY N/A 2/10/2021    Procedure: COLONOSCOPY;  Surgeon: Robert Ayers MD;  [facility]  Service: Endoscopy;  Laterality: N/A;  rescheduled due to poor bowel prep-BB  negative covid screening 2/6/21-BB  dialysis M-W-F-BB  okay to r/s for 2/9/21 and to hold Plavix per Dr. KIRAN Wong-BB  labs same day-BB    CORONARY STENT PLACEMENT N/A 7/25/2019    Procedure: INSERTION, STENT, CORONARY ARTERY;  Surgeon: Miki Zendejas MD;  Location: [facility] CATH LAB;  Service: Cardiology;  Laterality: N/A;    DIALYSIS FISTULA CREATION      FISTULOGRAM N/A 2/18/2019    Procedure:  Fistulogram;  Surgeon: Yaneth De La Cruz MD;  Location: [facility] CATH LAB;  Service: Cardiology;  Laterality: N/A;    FISTULOGRAM Right 7/23/2019    Procedure: Fistulogram;  Surgeon: Oz Cordoba MD;  Location: [facility] CATH LAB;  Service: Cardiology;  Laterality: Right;    LAPAROSCOPIC ROBOT-ASSISTED SURGICAL REMOVAL OF KIDNEY USING DA JAIME XI Right 5/19/2022    Procedure: XI ROBOTIC NEPHRECTOMY;  Surgeon: Aidan Hendricks MD;  Location: [facility] OR [facility] FLR;  Service: Urology;  Laterality: Right;  3hrs    LAPAROSCOPIC ROBOT-ASSISTED SURGICAL REMOVAL OF KIDNEY USING DA JAIME XI Left 1/5/2023    Procedure: XI ROBOTIC NEPHRECTOMY;  Surgeon: Aidan Hendricks MD;  Location: [facility] OR [facility] FLR;  Service: Urology;  Laterality: Left;  4 hrs    LEFT HEART CATHETERIZATION Left 7/25/2019    Procedure: Left heart cath;  Surgeon: Miki Zendejas MD;  Location: [facility] CATH LAB;  Service: Cardiology;  Laterality: Left;    REPAIR  1/5/2023    Procedure: REPAIR; COLOTOMY;  Surgeon: Maikel Lamb MD;  [address];  Service: General;;    RETINAL LASER PROCEDURE Bilateral 2018 or 2017    Dr. Rothman    VASCULAR SURGERY         Review of patient's allergies indicates:   Allergen Reactions    No known allergies        Current Facility-Administered Medications on File Prior to Encounter   Medication    lidocaine (PF) 10 mg/ml (1%) injection 10 mg     Current Outpatient Medications on File Prior to Encounter   Medication Sig    amLODIPine (NORVASC) 10 MG tablet Take 1 tablet (10 mg total) by mouth once daily.    aspirin (ECOTRIN) 81 MG EC tablet Take 1 tablet (81 mg total) by mouth once daily.    AURYXIA 210 mg iron Tab Take 420 mg by mouth 3 (three) times daily with meals. Take 1 with snacks    carvediloL (COREG) 25 MG tablet TAKE 1 TABLET BY MOUTH TWICE A DAY WITH FOOD    cinacalcet (SENSIPAR) 90 MG Tab Take 1 tablet by mouth once daily.    hydrALAZINE (APRESOLINE) 25 MG tablet Take 1 tablet (25 mg total) by mouth every  8 (eight) hours. for SBP > 140mm HG    OPW TEST CLAIM - DO NOT FILL OPW test claim. Do not fill. (Patient not taking: Reported on 2/14/2023)    polyethylene glycol (GLYCOLAX) 17 gram/dose powder Use cap to measure 17 g, then mix in liquid and drink by mouth once daily.    sevelamer carbonate (RENVELA) 800 mg Tab Take 800 mg by mouth 3 (three) times daily with meals.    [DISCONTINUED] oxyCODONE (ROXICODONE) 5 MG immediate release tablet Take 2 tablets (10 mg total) by mouth every 4 (four) hours as needed for Pain.     Family History       Problem Relation (Age of Onset)    Cancer Maternal Grandfather    Cataracts Mother    Diabetes Mother    Heart disease Brother    Hypertension Mother, Father, Sister, Brother    Kidney disease Brother          Tobacco Use    Smoking status: Never    Smokeless tobacco: Never   Substance and Sexual Activity    Alcohol use: Not Currently     Comment: One drink a month    Drug use: No    Sexual activity: Yes     Partners: Female     Birth control/protection: None     Review of Systems   Constitutional:  Positive for fatigue. Negative for chills, diaphoresis and fever.   HENT:  Negative for congestion and rhinorrhea.    Respiratory:  Positive for cough and shortness of breath. Negative for chest tightness and wheezing.    Cardiovascular:  Positive for palpitations. Negative for chest pain and leg swelling.   Gastrointestinal:  Negative for abdominal distention, abdominal pain, constipation, diarrhea, nausea and vomiting.   Genitourinary:  Positive for decreased urine volume (ESRD).   Musculoskeletal:  Negative for arthralgias, back pain and myalgias.   Skin:  Negative for color change and pallor.   Neurological:  Negative for dizziness, syncope, weakness, numbness and headaches.   Psychiatric/Behavioral:  Negative for agitation, behavioral problems and confusion.    Objective:     Vital Signs (Most Recent):  Temp: 97.4 °F (36.3 °C) (05/01/23 1128)  Pulse: 85 (05/01/23 1427)  Resp: (!) 22  (05/01/23 1427)  BP: (!) 221/107 (05/01/23 1234)  SpO2: 95 % (05/01/23 1427)   Vital Signs (24h Range):  Temp:  [97.4 °F (36.3 °C)] 97.4 °F (36.3 °C)  Pulse:  [85-93] 85  Resp:  [18-22] 22  SpO2:  [91 %-97 %] 95 %  BP: (203-221)/(100-107) 221/107     Weight: 76.2 kg (168 lb)  Body mass index is 25.54 kg/m².    Physical Exam  Vitals and nursing note reviewed.   Constitutional:       General: He is not in acute distress.     Appearance: Normal appearance. He is normal weight. He is ill-appearing.   HENT:      Head: Normocephalic and atraumatic.      Nose: Nose normal. No congestion.      Mouth/Throat:      Mouth: Mucous membranes are moist.      Pharynx: Oropharynx is clear.   Eyes:      Extraocular Movements: Extraocular movements intact.      Conjunctiva/sclera: Conjunctivae normal.      Pupils: Pupils are equal, round, and reactive to light.   Cardiovascular:      Rate and Rhythm: Normal rate and regular rhythm.      Pulses: Normal pulses.      Heart sounds: Murmur heard.   Pulmonary:      Breath sounds: No stridor. Rales present. No wheezing or rhonchi.      Comments: On 15L NRB satting 95%  Able to speak in full sentences  Chest:      Chest wall: No tenderness.   Abdominal:      General: Abdomen is flat. Bowel sounds are normal. There is no distension.      Palpations: Abdomen is soft.      Tenderness: There is no abdominal tenderness. There is no guarding or rebound.   Musculoskeletal:         General: No swelling or tenderness. Normal range of motion.      Right lower leg: No edema.      Left lower leg: No edema.   Skin:     General: Skin is warm and dry.   Neurological:      General: No focal deficit present.      Mental Status: He is alert and oriented to person, place, and time. Mental status is at baseline.      Cranial Nerves: No cranial nerve deficit.      Motor: No weakness.   Psychiatric:         Mood and Affect: Mood normal.         Behavior: Behavior normal.         Thought Content: Thought content  normal.         Judgment: Judgment normal.         CRANIAL NERVES     CN III, IV, VI   Pupils are equal, round, and reactive to light.     Significant Labs: All pertinent labs within the past 24 hours have been reviewed.  Blood Culture: No results for input(s): LABBLOO in the last 48 hours.  CBC:   Recent Labs   Lab 05/01/23  1226   WBC 11.17   HGB 9.5*   HCT 30.2*        CMP:   Recent Labs   Lab 05/01/23  1226      K 4.4      CO2 18*   *   BUN 69*   CREATININE 12.8*   CALCIUM 9.6   PROT 6.9   ALBUMIN 2.9*   BILITOT 0.9   ALKPHOS 86   AST 10   ALT 8*   ANIONGAP 12     Magnesium: No results for input(s): MG in the last 48 hours.  POCT Glucose: No results for input(s): POCTGLUCOSE in the last 48 hours.    Significant Imaging: I have reviewed all pertinent imaging results/findings within the past 24 hours.

## 2023-05-02 PROBLEM — I50.33 ACUTE ON CHRONIC DIASTOLIC HEART FAILURE: Status: ACTIVE | Noted: 2023-05-02

## 2023-05-02 LAB
ANION GAP SERPL CALC-SCNC: 10 MMOL/L (ref 8–16)
ASCENDING AORTA: 3.52 CM
AV INDEX (PROSTH): 0.52
AV MEAN GRADIENT: 10 MMHG
AV PEAK GRADIENT: 20 MMHG
AV VALVE AREA: 1.87 CM2
AV VELOCITY RATIO: 0.5
BASOPHILS # BLD AUTO: 0.09 K/UL (ref 0–0.2)
BASOPHILS NFR BLD: 0.7 % (ref 0–1.9)
BSA FOR ECHO PROCEDURE: 1.82 M2
BUN SERPL-MCNC: 37 MG/DL (ref 6–20)
CALCIUM SERPL-MCNC: 8.9 MG/DL (ref 8.7–10.5)
CHLORIDE SERPL-SCNC: 104 MMOL/L (ref 95–110)
CHOLEST SERPL-MCNC: 144 MG/DL (ref 120–199)
CHOLEST/HDLC SERPL: 2.8 {RATIO} (ref 2–5)
CO2 SERPL-SCNC: 24 MMOL/L (ref 23–29)
CREAT SERPL-MCNC: 9 MG/DL (ref 0.5–1.4)
CV ECHO LV RWT: 0.49 CM
DIFFERENTIAL METHOD: ABNORMAL
DOP CALC AO PEAK VEL: 2.24 M/S
DOP CALC AO VTI: 41.97 CM
DOP CALC LVOT AREA: 3.6 CM2
DOP CALC LVOT DIAMETER: 2.14 CM
DOP CALC LVOT PEAK VEL: 1.12 M/S
DOP CALC LVOT STROKE VOLUME: 78.48 CM3
DOP CALCLVOT PEAK VEL VTI: 21.83 CM
E WAVE DECELERATION TIME: 99.92 MSEC
E/A RATIO: 2.87
E/E' RATIO: 13.23 M/S
ECHO LV POSTERIOR WALL: 1.4 CM (ref 0.6–1.1)
EJECTION FRACTION: 60 %
EOSINOPHIL # BLD AUTO: 0.2 K/UL (ref 0–0.5)
EOSINOPHIL NFR BLD: 1.9 % (ref 0–8)
ERYTHROCYTE [DISTWIDTH] IN BLOOD BY AUTOMATED COUNT: 16.8 % (ref 11.5–14.5)
EST. GFR  (NO RACE VARIABLE): 6.8 ML/MIN/1.73 M^2
ESTIMATED AVG GLUCOSE: 85 MG/DL (ref 68–131)
FRACTIONAL SHORTENING: 34 % (ref 28–44)
GLUCOSE SERPL-MCNC: 96 MG/DL (ref 70–110)
HBA1C MFR BLD: 4.6 % (ref 4–5.6)
HCT VFR BLD AUTO: 32 % (ref 40–54)
HDLC SERPL-MCNC: 51 MG/DL (ref 40–75)
HDLC SERPL: 35.4 % (ref 20–50)
HGB BLD-MCNC: 9.8 G/DL (ref 14–18)
IMM GRANULOCYTES # BLD AUTO: 0.05 K/UL (ref 0–0.04)
IMM GRANULOCYTES NFR BLD AUTO: 0.4 % (ref 0–0.5)
INTERVENTRICULAR SEPTUM: 1.4 CM (ref 0.6–1.1)
LA MAJOR: 4.98 CM
LA MINOR: 4.66 CM
LA WIDTH: 5.19 CM
LDLC SERPL CALC-MCNC: 74.2 MG/DL (ref 63–159)
LEFT ATRIUM SIZE: 4.43 CM
LEFT ATRIUM VOLUME INDEX MOD: 55.2 ML/M2
LEFT ATRIUM VOLUME INDEX: 52 ML/M2
LEFT ATRIUM VOLUME MOD: 100 CM3
LEFT ATRIUM VOLUME: 94.09 CM3
LEFT INTERNAL DIMENSION IN SYSTOLE: 3.79 CM (ref 2.1–4)
LEFT VENTRICLE DIASTOLIC VOLUME INDEX: 71.74 ML/M2
LEFT VENTRICLE DIASTOLIC VOLUME: 129.85 ML
LEFT VENTRICLE MASS INDEX: 197 G/M2
LEFT VENTRICLE SYSTOLIC VOLUME INDEX: 34.1 ML/M2
LEFT VENTRICLE SYSTOLIC VOLUME: 61.64 ML
LEFT VENTRICULAR INTERNAL DIMENSION IN DIASTOLE: 5.7 CM (ref 3.5–6)
LEFT VENTRICULAR MASS: 357.47 G
LV LATERAL E/E' RATIO: 10.75 M/S
LV SEPTAL E/E' RATIO: 17.2 M/S
LYMPHOCYTES # BLD AUTO: 1 K/UL (ref 1–4.8)
LYMPHOCYTES NFR BLD: 8.3 % (ref 18–48)
MAGNESIUM SERPL-MCNC: 2 MG/DL (ref 1.6–2.6)
MCH RBC QN AUTO: 28.8 PG (ref 27–31)
MCHC RBC AUTO-ENTMCNC: 30.6 G/DL (ref 32–36)
MCV RBC AUTO: 94 FL (ref 82–98)
MONOCYTES # BLD AUTO: 1.2 K/UL (ref 0.3–1)
MONOCYTES NFR BLD: 9.7 % (ref 4–15)
MV A" WAVE DURATION": 13.99 MSEC
MV PEAK A VEL: 0.3 M/S
MV PEAK E VEL: 0.86 M/S
MV STENOSIS PRESSURE HALF TIME: 28.98 MS
MV VALVE AREA P 1/2 METHOD: 7.59 CM2
NEUTROPHILS # BLD AUTO: 9.5 K/UL (ref 1.8–7.7)
NEUTROPHILS NFR BLD: 79 % (ref 38–73)
NONHDLC SERPL-MCNC: 93 MG/DL
NRBC BLD-RTO: 0 /100 WBC
PHOSPHATE SERPL-MCNC: 4 MG/DL (ref 2.7–4.5)
PISA TR MAX VEL: 2.88 M/S
PLATELET # BLD AUTO: 328 K/UL (ref 150–450)
PMV BLD AUTO: 10.9 FL (ref 9.2–12.9)
POTASSIUM SERPL-SCNC: 4.2 MMOL/L (ref 3.5–5.1)
PULM VEIN S/D RATIO: 0.38
PV PEAK D VEL: 0.63 M/S
PV PEAK S VEL: 0.24 M/S
RA MAJOR: 4.82 CM
RA PRESSURE: 8 MMHG
RA WIDTH: 3.6 CM
RBC # BLD AUTO: 3.4 M/UL (ref 4.6–6.2)
RIGHT VENTRICULAR END-DIASTOLIC DIMENSION: 3.91 CM
RV TISSUE DOPPLER FREE WALL SYSTOLIC VELOCITY 1 (APICAL 4 CHAMBER VIEW): 9.6 CM/S
SINUS: 3.69 CM
SODIUM SERPL-SCNC: 138 MMOL/L (ref 136–145)
STJ: 2.99 CM
TDI LATERAL: 0.08 M/S
TDI SEPTAL: 0.05 M/S
TDI: 0.07 M/S
TR MAX PG: 33 MMHG
TRICUSPID ANNULAR PLANE SYSTOLIC EXCURSION: 2.49 CM
TRIGL SERPL-MCNC: 94 MG/DL (ref 30–150)
TV REST PULMONARY ARTERY PRESSURE: 41 MMHG
WBC # BLD AUTO: 12.07 K/UL (ref 3.9–12.7)

## 2023-05-02 PROCEDURE — 94761 N-INVAS EAR/PLS OXIMETRY MLT: CPT | Mod: NTX

## 2023-05-02 PROCEDURE — 80061 LIPID PANEL: CPT | Mod: NTX | Performed by: PHYSICIAN ASSISTANT

## 2023-05-02 PROCEDURE — 36415 COLL VENOUS BLD VENIPUNCTURE: CPT | Mod: NTX | Performed by: HOSPITALIST

## 2023-05-02 PROCEDURE — 90935 PR HEMODIALYSIS, ONE EVALUATION: ICD-10-PCS | Mod: NTX,,, | Performed by: NURSE PRACTITIONER

## 2023-05-02 PROCEDURE — 90935 HEMODIALYSIS ONE EVALUATION: CPT | Mod: NTX,,, | Performed by: NURSE PRACTITIONER

## 2023-05-02 PROCEDURE — 96361 HYDRATE IV INFUSION ADD-ON: CPT

## 2023-05-02 PROCEDURE — 87340 HEPATITIS B SURFACE AG IA: CPT | Mod: NTX | Performed by: HOSPITALIST

## 2023-05-02 PROCEDURE — 36415 COLL VENOUS BLD VENIPUNCTURE: CPT | Mod: NTX | Performed by: PHYSICIAN ASSISTANT

## 2023-05-02 PROCEDURE — 85025 COMPLETE CBC W/AUTO DIFF WBC: CPT | Mod: NTX | Performed by: PHYSICIAN ASSISTANT

## 2023-05-02 PROCEDURE — 25000003 PHARM REV CODE 250: Mod: NTX | Performed by: HOSPITALIST

## 2023-05-02 PROCEDURE — G0257 UNSCHED DIALYSIS ESRD PT HOS: HCPCS | Mod: NTX

## 2023-05-02 PROCEDURE — 80048 BASIC METABOLIC PNL TOTAL CA: CPT | Mod: NTX | Performed by: PHYSICIAN ASSISTANT

## 2023-05-02 PROCEDURE — 99900035 HC TECH TIME PER 15 MIN (STAT): Mod: NTX

## 2023-05-02 PROCEDURE — 63600175 PHARM REV CODE 636 W HCPCS: Mod: NTX | Performed by: PHYSICIAN ASSISTANT

## 2023-05-02 PROCEDURE — 84100 ASSAY OF PHOSPHORUS: CPT | Mod: NTX | Performed by: PHYSICIAN ASSISTANT

## 2023-05-02 PROCEDURE — 96376 TX/PRO/DX INJ SAME DRUG ADON: CPT | Mod: NTX

## 2023-05-02 PROCEDURE — 99232 SBSQ HOSP IP/OBS MODERATE 35: CPT | Mod: 95,NTX,, | Performed by: HOSPITALIST

## 2023-05-02 PROCEDURE — 99232 PR SUBSEQUENT HOSPITAL CARE,LEVL II: ICD-10-PCS | Mod: 95,NTX,, | Performed by: HOSPITALIST

## 2023-05-02 PROCEDURE — 25000003 PHARM REV CODE 250: Mod: NTX

## 2023-05-02 PROCEDURE — 25000003 PHARM REV CODE 250: Mod: NTX | Performed by: PHYSICIAN ASSISTANT

## 2023-05-02 PROCEDURE — 96372 THER/PROPH/DIAG INJ SC/IM: CPT | Mod: NTX | Performed by: PHYSICIAN ASSISTANT

## 2023-05-02 PROCEDURE — G0378 HOSPITAL OBSERVATION PER HR: HCPCS | Mod: NTX

## 2023-05-02 PROCEDURE — 83036 HEMOGLOBIN GLYCOSYLATED A1C: CPT | Mod: NTX | Performed by: HOSPITALIST

## 2023-05-02 PROCEDURE — 83735 ASSAY OF MAGNESIUM: CPT | Mod: NTX | Performed by: PHYSICIAN ASSISTANT

## 2023-05-02 PROCEDURE — 25000003 PHARM REV CODE 250: Mod: NTX | Performed by: NURSE PRACTITIONER

## 2023-05-02 PROCEDURE — 27000221 HC OXYGEN, UP TO 24 HOURS: Mod: NTX

## 2023-05-02 RX ORDER — ASPIRIN 81 MG/1
81 TABLET ORAL DAILY
Status: DISCONTINUED | OUTPATIENT
Start: 2023-05-02 | End: 2023-05-03 | Stop reason: HOSPADM

## 2023-05-02 RX ORDER — SODIUM CHLORIDE 9 MG/ML
INJECTION, SOLUTION INTRAVENOUS ONCE
Status: COMPLETED | OUTPATIENT
Start: 2023-05-02 | End: 2023-05-02

## 2023-05-02 RX ADMIN — AMLODIPINE BESYLATE 10 MG: 10 TABLET ORAL at 08:05

## 2023-05-02 RX ADMIN — ASPIRIN 81 MG: 81 TABLET, COATED ORAL at 12:05

## 2023-05-02 RX ADMIN — HEPARIN SODIUM 5000 UNITS: 5000 INJECTION INTRAVENOUS; SUBCUTANEOUS at 01:05

## 2023-05-02 RX ADMIN — HYDRALAZINE HYDROCHLORIDE 25 MG: 25 TABLET, FILM COATED ORAL at 09:05

## 2023-05-02 RX ADMIN — ONDANSETRON 4 MG: 2 INJECTION INTRAMUSCULAR; INTRAVENOUS at 01:05

## 2023-05-02 RX ADMIN — HYDRALAZINE HYDROCHLORIDE 25 MG: 25 TABLET, FILM COATED ORAL at 05:05

## 2023-05-02 RX ADMIN — HYDRALAZINE HYDROCHLORIDE 25 MG: 25 TABLET, FILM COATED ORAL at 01:05

## 2023-05-02 RX ADMIN — SODIUM CHLORIDE: 9 INJECTION, SOLUTION INTRAVENOUS at 02:05

## 2023-05-02 RX ADMIN — CARVEDILOL 25 MG: 25 TABLET, FILM COATED ORAL at 08:05

## 2023-05-02 RX ADMIN — ONDANSETRON 8 MG: 8 TABLET, ORALLY DISINTEGRATING ORAL at 12:05

## 2023-05-02 RX ADMIN — HEPARIN SODIUM 5000 UNITS: 5000 INJECTION INTRAVENOUS; SUBCUTANEOUS at 09:05

## 2023-05-02 RX ADMIN — Medication 324 MG: at 06:05

## 2023-05-02 RX ADMIN — CINACALCET 90 MG: 30 TABLET, FILM COATED ORAL at 08:05

## 2023-05-02 RX ADMIN — POLYETHYLENE GLYCOL 3350 17 G: 17 POWDER, FOR SOLUTION ORAL at 08:05

## 2023-05-02 RX ADMIN — CARVEDILOL 25 MG: 25 TABLET, FILM COATED ORAL at 06:05

## 2023-05-02 RX ADMIN — Medication 324 MG: at 01:05

## 2023-05-02 RX ADMIN — SIMETHICONE 80 MG: 80 TABLET, CHEWABLE ORAL at 01:05

## 2023-05-02 NOTE — PLAN OF CARE
SW met with patient at bedside. Patient was alert and cooperative. Patient stated that he lives alone and he has support from his son Haroldo Funk in regards to his healthcare. Patient did state that he does get out the house from time to time. Patient stated that he is compliant with his medications and ADLs. Patient stated that he has minimal mobility difficulties and uses a Rolator to get around. Patient stated that he will return home upon discharge. Patient does get dialysis m/w/f on [facility] @ 11am. Patient doesn't have any post acute needs at this time.     Myrna Rosa, [facility], MS-[facility]  Medical Social Worker/  ER Department         [facility] ([place] [address])  Initial Discharge Assessment       Primary Care Provider: Tess Ledbetter MD    Admission Diagnosis: End stage renal disease [N18.6]  Hypoxia [R09.02]  Elevated troponin [R77.8]  Acute respiratory failure with hypoxia [J96.01]  Chest pain [R07.9]  Hypervolemia, unspecified hypervolemia type [E87.70]  Chest pain, unspecified type [R07.9]    Admission Date: 5/1/2023  Expected Discharge Date: 5/2/2023    Discharge Barriers Identified: (P) None    Payor: [employer] MANAGED MEDICARE / Plan: [employer] HMO PPO SPECIAL NEEDS / Product Type: Medicare Advantage /     Extended Emergency Contact Information  Primary Emergency Contact: DickinsonGabby  Address: [address] [place] [address]  Mobile Phone: [phone]  Relation: Mother    Discharge Plan A: (P) Home  Discharge Plan B: (P) Home      [id number] [place], [place] - [address].  [address] [place] [place] [address] [address]  Phone: [phone] Fax: [phone]    [id number] - [place], [place] - [address]  [address] [facility] [address] [facility] [address]  Phone: [phone] Fax: [phone]      Initial Assessment (most recent)       Adult Discharge Assessment - 05/02/23 0028          Discharge Assessment  "   Assessment Type Discharge Planning Assessment (P)      Confirmed/corrected address, phone number and insurance Yes (P)      Confirmed Demographics Correct on Facesheet (P)      Source of Information patient (P)      When was your last doctors appointment? -- (P)    3 months ago    Does patient/caregiver understand observation status Yes (P)      Communicated QUIN with patient/caregiver Yes (P)      Reason For Admission "Feeling bad" Chest Hurting" (P)      People in Home alone (P)      Facility Arrived From: Home (P)      Do you expect to return to your current living situation? Yes (P)      Do you have help at home or someone to help you manage your care at home? Yes (P)      Who are your caregiver(s) and their phone number(s)? Son Haroldo Iniguez (P)      Prior to hospitilization cognitive status: Alert/Oriented (P)      Current cognitive status: Alert/Oriented (P)      Walking or Climbing Stairs ambulation difficulty, requires equipment (P)      Mobility Management Rollater (P)      Home Accessibility wheelchair accessible (P)      Home Layout Able to live on 1st floor (P)      Equipment Currently Used at Home rollator (P)      Readmission within 30 days? No (P)      Patient currently being followed by outpatient case management? No (P)      Do you currently have service(s) that help you manage your care at home? No (P)      Do you take prescription medications? Yes (P)      Do you have prescription coverage? Yes (P)      Coverage [employer] Managed Medicare (P)      Do you have any problems affording any of your prescribed medications? No (P)      Is the patient taking medications as prescribed? yes (P)      Who is going to help you get home at discharge? Haroldo Funk (P)      How do you get to doctors appointments? health plan transportation (P)      Are you on dialysis? Yes (P)      Dialysis Name and Scheduled days [facility] M/W/F 11am (P)      Do you take coumadin? No (P)      Discharge Plan A Home (P)      Discharge Plan " B Home (P)      DME Needed Upon Discharge  none (P)      Discharge Plan discussed with: Patient (P)      Discharge Barriers Identified None (P)         Physical Activity    On average, how many days per week do you engage in moderate to strenuous exercise (like a brisk walk)? 3 days (P)      On average, how many minutes do you engage in exercise at this level? 30 min (P)         Financial Resource Strain    How hard is it for you to pay for the very basics like food, housing, medical care, and heating? Not hard at all (P)         Housing Stability    In the last 12 months, was there a time when you were not able to pay the mortgage or rent on time? No (P)      In the last 12 months, how many places have you lived? 1 (P)      In the last 12 months, was there a time when you did not have a steady place to sleep or slept in a shelter (including now)? No (P)         Transportation Needs    In the past 12 months, has lack of transportation kept you from medical appointments or from getting medications? No (P)      In the past 12 months, has lack of transportation kept you from meetings, work, or from getting things needed for daily living? No (P)         Food Insecurity    Within the past 12 months, you worried that your food would run out before you got the money to buy more. Never true (P)      Within the past 12 months, the food you bought just didn't last and you didn't have money to get more. Never true (P)         Stress    Do you feel stress - tense, restless, nervous, or anxious, or unable to sleep at night because your mind is troubled all the time - these days? Not at all (P)         Social Connections    In a typical week, how many times do you talk on the phone with family, friends, or neighbors? Once a week (P)      How often do you get together with friends or relatives? Once a week (P)      How often do you attend Religious or Yazidi services? More than 4 times per year (P)      Do you belong to any clubs  or organizations such as Anabaptist groups, unions, fraternal or athletic groups, or school groups? No (P)      How often do you attend meetings of the clubs or organizations you belong to? Never (P)      Are you , , , , never , or living with a partner?  (P)         Alcohol Use    Q1: How often do you have a drink containing alcohol? Never (P)      Q2: How many drinks containing alcohol do you have on a typical day when you are drinking? Patient does not drink (P)      Q3: How often do you have six or more drinks on one occasion? Never (P)

## 2023-05-02 NOTE — NURSING
Report received from JUDSON Sanders RN. Patient arrived to [facility] via stretcher. AAOx4. HD tx initiated via Right forearm AV fistula with 15g needles. Tolerating well.

## 2023-05-02 NOTE — PROGRESS NOTES
[facility] NOTE     Patient currently on hemodialysis for removal of uremic toxins .     Patient seen and evaluated on hemodialysis, tolerating treatment, see HD flowsheet for vitals and assessments.      No Hypotension, chest pain, shortness of breath, cramping, nausea or vomiting.     Target UF: 1.5 L as tolerated, keep MAP >65.  Additional treatment today for volume management. Patient presents under his dry weight of 72.6 kg. Pre HD weight 70.3 kg. Dry weight needs adjustment as patient remains slightly SOB on 5 L HAMMAD Mancilla DNP, APRN, FNP-C  Department of Nephrology  [facility]  Pager: [phone]

## 2023-05-02 NOTE — PLAN OF CARE
05/02/23 0038   Post-Acute Status   Post-Acute Authorization Other   Coverage Medicare managed   Other Status No Post-Acute Service Needs   Discharge Delays None known at this time   Discharge Plan   Discharge Plan A Home   Discharge Plan B Home     SW met with patient at bedside. Patient was finishing a dialysis treatment while being assessed. Patient stated that he will return home upon discharge and doesnt need any post acute needs at th time.     YASMEEN Lott, MSW-LMSW  Medical Social Worker/  ER Department

## 2023-05-02 NOTE — RESPIRATORY THERAPY
RAPID RESPONSE RESPIRATORY THERAPY PROACTIVE ROUNDING NOTE             Time of visit: 1008     Code Status: Full Code   : 1975  Bed: 40239/29629 A:   MRN: [id number]  Time spent at the bedside: 15 -30 min    SITUATION    Evaluated patient for: HFNC Compliance     BACKGROUND    Patient has a past medical history of CAD (coronary artery disease), native coronary artery, Cataract, Diabetes mellitus, Diabetic retinopathy, Dialysis patient, DM type 2 causing renal disease, not at goal, ESRD (end stage renal disease) started dialysis 2014, Hyperparathyroidism, secondary renal, Hypertension, NSTEMI (non-ST elevated myocardial infarction), Organ transplant candidate, Pneumonia, Renal cell carcinoma of right kidney, Renal hypertension, and Stroke.    24 Hours Vitals Range:  Temp:  [97.6 °F (36.4 °C)-98.4 °F (36.9 °C)]   Pulse:  [77-92]   Resp:  [6-22]   BP: (141-221)/()   SpO2:  [88 %-100 %]     Labs:    Recent Labs     23  1226 23  0324    138   K 4.4 4.2    104   CO2 18* 24   CREATININE 12.8* 9.0*   * 96   PHOS  --  4.0   MG  --  2.0        No results for input(s): PH, PCO2, PO2, HCO3, POCSATURATED, BE in the last 72 hours.    ASSESSMENT/INTERVENTIONS    Pt resting comfortably with no respiratory needs at this time.    Last VS   Temp: 97.6 °F (36.4 °C) (720)  Pulse: 78 ( 1120)  Resp: 6 (1120)  BP: 190/93 ( 0720)  SpO2: 99 % ( 1120)    Level of Consciousness: Level of Consciousness (AVPU): alert  Respiratory Effort: Respiratory Effort: Normal, Unlabored Expansion/Accessory Muscle Usage: Expansion/Accessory Muscles/Retractions: no use of accessory muscles, no retractions, expansion symmetric  All Lung Field Breath Sounds: All Lung Fields Breath Sounds: Anterior:, Lateral:, diminished  O2 Device/Concentration: H.F. N.[place]. at 10LPM  Was the O2 device able to be weaned? Yes  Ambu at bedside:      Active Orders   Respiratory Care    Inhalation Treatment Q4H  PRN     Frequency: Q4H PRN     Number of Occurrences: Until Specified    Oxygen Continuous     Frequency: Continuous     Number of Occurrences: Until Specified     Order Questions:      Device type: High flow      Device: High Flow Nasal Cannula (6 -15 Liters)      LPM: 15      Titrate O2 per Oxygen Titration Protocol: Yes      To maintain SpO2 goal of: >= 90%      Notify MD of: Inability to achieve desired SpO2; Sudden change in patient status and requires 20% increase in FiO2; Patient requires >60% FiO2    Pulse Oximetry Continuous     Frequency: Continuous     Number of Occurrences: Until Specified       RECOMMENDATIONS    We recommend: RRT Recs: titrated to 7LPM      FOLLOW-UP    Please call back the Rapid Response RT, Ronald Mike RRT at x [phone] for any questions or concerns.

## 2023-05-02 NOTE — ED NOTES
Nurse assumed care for patient, report called to floor by previous shift.  Dialysis complete, 3L removed, patient NAD, resting in bed, no complaints, telebox placed.  Patient remains on high  flow O2 15 l/min.

## 2023-05-02 NOTE — PROGRESS NOTES
05/01/23 1830   Post-Hemodialysis Assessment   Rinseback Volume (mL) 250 mL   Blood Volume Processed (Liters) 69.4 L   Dialyzer Clearance Lightly streaked   Duration of Treatment 225 minutes   Additional Fluid Intake (mL) 300 mL   Total UF (mL) 3550 mL   Net Fluid Removal 3000   Patient Response to Treatment nausea   Arterial bleeding stop time (min) 10 min   Venous bleeding stop time (min) 10 min   Post-Hemodialysis Comments see notes     HD TX complete. Unable to reach desired UF due to pt complaints of nausea. Zofran given with some relief of nausea. Net removal 3000 ml. Pt AAO, VSS, NAD. Hemostasis achieved. Report given to primary nurse at bedside.

## 2023-05-02 NOTE — NURSING
Pt arrived to unit at this time. Oriented to uint, room, call light, and safety precautions. Pt currently on HF NC 15 lpm. PIV to LFA flushes well. Drsg C/D/I. Fistula to RFA. Drsg C/D/I. Bruit and thrill present.  A&Ox 4. VSS except HTN. MD notified. Skin assessment completed. Safety precautions in place. Call light in reach. No further concerns noted at this time.

## 2023-05-03 VITALS
RESPIRATION RATE: 17 BRPM | TEMPERATURE: 98 F | OXYGEN SATURATION: 98 % | HEART RATE: 71 BPM | SYSTOLIC BLOOD PRESSURE: 159 MMHG | BODY MASS INDEX: 24.17 KG/M2 | DIASTOLIC BLOOD PRESSURE: 94 MMHG | WEIGHT: 154 LBS | HEIGHT: 67 IN

## 2023-05-03 PROBLEM — N18.6 END STAGE RENAL DISEASE: Status: ACTIVE | Noted: 2023-05-03

## 2023-05-03 LAB
ANION GAP SERPL CALC-SCNC: 12 MMOL/L (ref 8–16)
BASOPHILS # BLD AUTO: 0.06 K/UL (ref 0–0.2)
BASOPHILS NFR BLD: 0.8 % (ref 0–1.9)
BUN SERPL-MCNC: 28 MG/DL (ref 6–20)
CALCIUM SERPL-MCNC: 8.5 MG/DL (ref 8.7–10.5)
CHLORIDE SERPL-SCNC: 102 MMOL/L (ref 95–110)
CO2 SERPL-SCNC: 23 MMOL/L (ref 23–29)
CREAT SERPL-MCNC: 7.3 MG/DL (ref 0.5–1.4)
DIFFERENTIAL METHOD: ABNORMAL
EOSINOPHIL # BLD AUTO: 0.4 K/UL (ref 0–0.5)
EOSINOPHIL NFR BLD: 5.2 % (ref 0–8)
ERYTHROCYTE [DISTWIDTH] IN BLOOD BY AUTOMATED COUNT: 16.3 % (ref 11.5–14.5)
EST. GFR  (NO RACE VARIABLE): 8.7 ML/MIN/1.73 M^2
GLUCOSE SERPL-MCNC: 87 MG/DL (ref 70–110)
HBV SURFACE AG SERPL QL IA: NORMAL
HCT VFR BLD AUTO: 29.9 % (ref 40–54)
HGB BLD-MCNC: 9.1 G/DL (ref 14–18)
IMM GRANULOCYTES # BLD AUTO: 0.03 K/UL (ref 0–0.04)
IMM GRANULOCYTES NFR BLD AUTO: 0.4 % (ref 0–0.5)
LYMPHOCYTES # BLD AUTO: 1.5 K/UL (ref 1–4.8)
LYMPHOCYTES NFR BLD: 19.5 % (ref 18–48)
MAGNESIUM SERPL-MCNC: 1.9 MG/DL (ref 1.6–2.6)
MCH RBC QN AUTO: 28.4 PG (ref 27–31)
MCHC RBC AUTO-ENTMCNC: 30.4 G/DL (ref 32–36)
MCV RBC AUTO: 93 FL (ref 82–98)
MONOCYTES # BLD AUTO: 0.8 K/UL (ref 0.3–1)
MONOCYTES NFR BLD: 10 % (ref 4–15)
NEUTROPHILS # BLD AUTO: 5.1 K/UL (ref 1.8–7.7)
NEUTROPHILS NFR BLD: 64.1 % (ref 38–73)
NRBC BLD-RTO: 0 /100 WBC
PHOSPHATE SERPL-MCNC: 3.1 MG/DL (ref 2.7–4.5)
PLATELET # BLD AUTO: 341 K/UL (ref 150–450)
PMV BLD AUTO: 10.2 FL (ref 9.2–12.9)
POTASSIUM SERPL-SCNC: 4 MMOL/L (ref 3.5–5.1)
RBC # BLD AUTO: 3.2 M/UL (ref 4.6–6.2)
SODIUM SERPL-SCNC: 137 MMOL/L (ref 136–145)
WBC # BLD AUTO: 7.88 K/UL (ref 3.9–12.7)

## 2023-05-03 PROCEDURE — 90935 HEMODIALYSIS ONE EVALUATION: CPT | Mod: NTX,,, | Performed by: NURSE PRACTITIONER

## 2023-05-03 PROCEDURE — 25000003 PHARM REV CODE 250: Mod: NTX | Performed by: PHYSICIAN ASSISTANT

## 2023-05-03 PROCEDURE — G0378 HOSPITAL OBSERVATION PER HR: HCPCS | Mod: NTX

## 2023-05-03 PROCEDURE — 84100 ASSAY OF PHOSPHORUS: CPT | Mod: NTX | Performed by: PHYSICIAN ASSISTANT

## 2023-05-03 PROCEDURE — 90935 PR HEMODIALYSIS, ONE EVALUATION: ICD-10-PCS | Mod: NTX,,, | Performed by: NURSE PRACTITIONER

## 2023-05-03 PROCEDURE — G0257 UNSCHED DIALYSIS ESRD PT HOS: HCPCS | Mod: NTX

## 2023-05-03 PROCEDURE — 25000003 PHARM REV CODE 250: Mod: NTX | Performed by: HOSPITALIST

## 2023-05-03 PROCEDURE — 99238 PR HOSPITAL DISCHARGE DAY,<30 MIN: ICD-10-PCS | Mod: NTX,,, | Performed by: HOSPITALIST

## 2023-05-03 PROCEDURE — 99238 HOSP IP/OBS DSCHRG MGMT 30/<: CPT | Mod: NTX,,, | Performed by: HOSPITALIST

## 2023-05-03 PROCEDURE — 94761 N-INVAS EAR/PLS OXIMETRY MLT: CPT | Mod: NTX

## 2023-05-03 PROCEDURE — 85025 COMPLETE CBC W/AUTO DIFF WBC: CPT | Mod: NTX | Performed by: PHYSICIAN ASSISTANT

## 2023-05-03 PROCEDURE — 25000003 PHARM REV CODE 250: Mod: NTX

## 2023-05-03 PROCEDURE — 83735 ASSAY OF MAGNESIUM: CPT | Mod: NTX | Performed by: PHYSICIAN ASSISTANT

## 2023-05-03 PROCEDURE — 80048 BASIC METABOLIC PNL TOTAL CA: CPT | Mod: NTX | Performed by: PHYSICIAN ASSISTANT

## 2023-05-03 PROCEDURE — 36415 COLL VENOUS BLD VENIPUNCTURE: CPT | Mod: NTX | Performed by: PHYSICIAN ASSISTANT

## 2023-05-03 PROCEDURE — 96372 THER/PROPH/DIAG INJ SC/IM: CPT | Mod: NTX | Performed by: PHYSICIAN ASSISTANT

## 2023-05-03 PROCEDURE — 63600175 PHARM REV CODE 636 W HCPCS: Mod: NTX | Performed by: PHYSICIAN ASSISTANT

## 2023-05-03 RX ADMIN — Medication 324 MG: at 05:05

## 2023-05-03 RX ADMIN — HYDRALAZINE HYDROCHLORIDE 25 MG: 25 TABLET, FILM COATED ORAL at 06:05

## 2023-05-03 RX ADMIN — CARVEDILOL 25 MG: 25 TABLET, FILM COATED ORAL at 08:05

## 2023-05-03 RX ADMIN — CARVEDILOL 25 MG: 25 TABLET, FILM COATED ORAL at 05:05

## 2023-05-03 RX ADMIN — ASPIRIN 81 MG: 81 TABLET, COATED ORAL at 08:05

## 2023-05-03 RX ADMIN — CINACALCET 90 MG: 30 TABLET, FILM COATED ORAL at 08:05

## 2023-05-03 RX ADMIN — HEPARIN SODIUM 5000 UNITS: 5000 INJECTION INTRAVENOUS; SUBCUTANEOUS at 06:05

## 2023-05-03 RX ADMIN — Medication 324 MG: at 08:05

## 2023-05-03 RX ADMIN — AMLODIPINE BESYLATE 10 MG: 10 TABLET ORAL at 08:05

## 2023-05-03 RX ADMIN — HYDRALAZINE HYDROCHLORIDE 25 MG: 25 TABLET, FILM COATED ORAL at 04:05

## 2023-05-03 RX ADMIN — POLYETHYLENE GLYCOL 3350 17 G: 17 POWDER, FOR SOLUTION ORAL at 08:05

## 2023-05-03 NOTE — SUBJECTIVE & OBJECTIVE
Interval History: still with high fio2 needs and hypertensive, going for UF this afternoon, echo ordered    Review of Systems   All other systems reviewed and are negative.  Objective:        Physical Exam  Vitals and nursing note reviewed.   Constitutional:       General: He is not in acute distress.     Appearance: He is not toxic-appearing.   HENT:      Head: Normocephalic and atraumatic.   Cardiovascular:      Rate and Rhythm: Normal rate and regular rhythm.   Pulmonary:      Effort: Pulmonary effort is normal.   Neurological:      General: No focal deficit present.      Mental Status: He is alert.   Psychiatric:         Behavior: Behavior normal.         Thought Content: Thought content normal.       Significant Labs: All pertinent labs within the past 24 hours have been reviewed.    Significant Imaging: I have reviewed all pertinent imaging results/findings within the past 24 hours.

## 2023-05-03 NOTE — PLAN OF CARE
Pt is A&OX4. On 1L NC. NSR on BSM. Anuric. HD pt. Blood pressure have been better throughout the shift. Up with SB. NADN this shift. Call light and personal items in reach. Safety maintained. Will continue to monitor.       Problem: Device-Related Complication Risk (Hemodialysis)  Goal: Safe, Effective Therapy Delivery  Outcome: Ongoing, Progressing     Problem: Hemodynamic Instability (Hemodialysis)  Goal: Effective Tissue Perfusion  Outcome: Ongoing, Progressing     Problem: Infection (Hemodialysis)  Goal: Absence of Infection Signs and Symptoms  Outcome: Ongoing, Progressing     Problem: Adult Inpatient Plan of Care  Goal: Plan of Care Review  Outcome: Ongoing, Progressing  Goal: Patient-Specific Goal (Individualized)  Outcome: Ongoing, Progressing  Goal: Absence of Hospital-Acquired Illness or Injury  Outcome: Ongoing, Progressing  Goal: Optimal Comfort and Wellbeing  Outcome: Ongoing, Progressing  Goal: Readiness for Transition of Care  Outcome: Ongoing, Progressing     Problem: Diabetes Comorbidity  Goal: Blood Glucose Level Within Targeted Range  Outcome: Ongoing, Progressing     Problem: Electrolyte Imbalance (Chronic Kidney Disease)  Goal: Electrolyte Balance  Outcome: Ongoing, Progressing     Problem: Fluid Volume Excess (Chronic Kidney Disease)  Goal: Fluid Balance  Outcome: Ongoing, Progressing

## 2023-05-03 NOTE — PROGRESS NOTES
Patient arrived in a wheelchair to dialysis unit.   Report received from Sakina Suárez per Doc Flowsheet.    Observation Hemodialysis initiated using the following:    Dialysis Access: RFA AVF    Needle size: 15 gauge X2  Insertion with no complications.    Will Maintain telemetry and blood pressure monitoring throughout treatment.  Refer to flowsheet and MAR for details.

## 2023-05-03 NOTE — CARE UPDATE
May11 Follow up with Tess Ledbetter MD  Thursday May 11, 2023  May 11 @ 8:30 AM   Please bring ID and dicharge summary.   Arrive 15 min. early [address]   [facility]   [place] [address]  [phone]

## 2023-05-03 NOTE — PROCEDURES
[facility] NOTE     Patient currently on hemodialysis for removal of uremic toxins and volume.     Patient seen and evaluated on hemodialysis, tolerating treatment, see HD flowsheet for vitals and assessments.      Ultrafiltration goal is 1-2L     Labs have been reviewed and the dialysate bath has been adjusted.     Assessment/Plan:  Seen on HD this afternoon, tolerating well  Plans for discharge post HD today, to resume OP schedule upon discharge.    SHY Villagomez, FNP-BC  Nephrology  Pager:  [phone]

## 2023-05-03 NOTE — ASSESSMENT & PLAN NOTE
Hypervolemia in ESRD  ESRD on HD  Palpitations  HTN urgency  CKD bone mineral disease  2/2 hyperparathyroidism    Patient with Hypoxic Respiratory failure which is Acute.  he is not on home oxygen. Supplemental oxygen was provided and noted- 15L      .   Signs/symptoms of respiratory failure include- tachypnea and wheezing. Contributing diagnoses includes - Pleural effusion and hypervolemia from ESRD Labs and images were reviewed. Patient Has not had a recent ABG. Will treat underlying causes and adjust management of respiratory failure as follows-    Patient sent from HD for SOB and palpitations. Hypervolemic on admit in setting of increased salt intake yesterday. EDW 72.6, weight on admission 76.2.     - Hypertensive urgency with /107, given home meds in ED  - Nephrology consulted for urgent HD   - CXR with pulmonary edema  - currently on 15L NRB -> still on NRB and hypertensive-> UF this afternoon, continue home oral antihypertensives. F/u echo.  - continue home BP medications   - continue home sensipar  - cardiac/ renal diet,1.2L fluid restriction  - re-evaluate for BP titration post HD  - wean O2 as tolerated  - tele   Reason For Visit  MIRIAN STOVALL is an established patient here today for a chief complaint of pt c/o cough x2 days.   :  services not used.   A chaperone is not applicable. He is unaccompanied.        Quality    Adult Wellness CI height documented, discussion of regular exercise, exercising regularly, no printed information given for activities, discussion of nutritional quality of diet, no patient education given about proper diet, not using alcohol, no tobacco use, does not have feelings of hopelessness (PHQ-2), no Anhedonia (PHQ-2), not referred to local mental health center, not taking medication for depression, monitoring patient, no preventive medicine therapy for influenza, no preventive medicine therapy for pneumococcal, pain scale level reviewed and has not fallen within the last 12 months.   Smoking Cessation CI tobacco use and provided intervention and counseling in regards to tobacco use.      History of Present Illness  Here for F/U.C/O Cough with expectoration started 2 days after taking Flu Vaccination.No fever or chills.No chest pain or SOB.  Taking BP meds and tolerating well.      Review of Systems    All other systems reviewed and negative.      Allergies  No Known Drug Allergies    Current Meds   1. AmLODIPine Besylate 10 MG Oral Tablet; take one tablet by mouth every day;   Therapy: 77Ydp5505 to (Evaluate:70Mjx4324)  Requested for: 12Bjw5560; Last   Rx:[id number] Ordered   2. Cialis 20 MG Oral Tablet; TAKE 1 TABLET BY MOUTH EVERY DAY AS NEEDED;   Therapy: 60Upq3763 to (Evaluate:25Jlz1037)  Requested for: 80Kew3362; Last   Rx:[id number] Ordered   3. Losartan Potassium 50 MG Oral Tablet; TAKE 1 TABLET BY MOUTH EVERY DAY;   Therapy: 50Bre1080 to (Evaluate:35Hok1119)  Requested for: 00Roi2326; Last   Rx:[id number] Ordered    Active Problems  Arthralgia (M25.50)  Benign essential hypertension (I10)   · Last Impression: 19 Mar 2018  Blood pressure is well controlled, 130/84   Emil Hensley  Bronchitis (J40)  Bruise of eye (S05.10XA)  Colonoscopy (Fiberoptic) Screening  Cough (R05)  Dermatitis (L30.9)  Encounter for general health examination (Z00.00)  Encounter for laboratory test (Z01.89)  Facial laceration, initial encounter (S01.81XA)  Fatty liver (K76.0)  Fixation hardware in lower extremity (Z96.7)  Fracture, tibia (S82.209A)  GERD (gastroesophageal reflux disease) (K21.9)  Hypercholesterolemia (E78.00)   · Last Impression: 19 Mar 2018  7/18/17 Lipid profile: Cholesterol 210, , HDL 52,      Triglycerides 85            2/2/17 Lipid profile: Cholesterol 210, , HDl 46, Triglycerides 194  Emil Hensley  Need for immunization against influenza (Z23)  Nephrolithiasis (N20.0)  Obesity (BMI 30.0-34.9) (E66.9)  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 No DVT or deep reflux      2. Physiologic right GSV reflux      3. Severe left GSV reflux with branch reflux disease   · Last Impression: 19 Mar 2018  He is doing very well following laser ablation of the left      great saphenous vein. He still has intermittent discomfort below the knee and is      interested in adjunctive treatment. I will schedule him for follow-up venous reflux imaging      and if there are open refluxing varicosities we will have him return to the office      for adjunctive ultrasound-guided sclerotherapy. In the meantime he will continue to wear      compression hose. He had the hardware removed from his right leg and he      now feels better with less discomfort. There is no significant reflux in his right leg      based on last year's ultrasound  VIV CASTREJON (Cardiology)  Vitamin D insufficiency (E55.9)    Past Medical  History  History of Cerumen Impaction  History of Cough (R05)  History of Cough (R05)  History of Dysuria (R30.0)  Encounter for general health examination (Z00.00)  History of Epidermal inclusion cyst (L72.0)  History of Finger laceration (S61.219A)  History of backache (Z87.39)  History of gastroenteritis (Z87.19)  History of headache (Z87.898)  History of influenza (Z87.09)  History of low back pain (Z87.39)  History of low back pain (Z87.39)  History of skin disorder (Z87.2)  History of upper respiratory infection (Z87.09)  History of Laceration of left hand (S61.412A)  History of Muscle cramps (R25.2)  Denied: History of No Recent Change In Medical History   · ,Fatty liver  History of Pain in left knee (M25.562)  History of Preoperative clearance (Z01.818)  History of Strain of left upper arm (S46.912A)  History of Upper respiratory symptom (R09.89)    Surgical History  History of Hernia Repair  Denied: History of Surgery    Family History  Sibling   No pertinent family history    Social History  Denied: Being A Social Drinker  Marital History   ·   Never a smoker  Never smoker  Denied: Smoking Cigarettes  Denied: Tobacco Use  Under Stress  Work History   ·     Vitals  Signs   Recorded: 12Oct2018 08:34AM   Height: 5 ft 8 in  Weight: 231 lb 0.64 oz  BMI Calculated: 35.13  BSA Calculated: 2.17  Systolic: 140, LUE, Sitting  Diastolic: 80, LUE, Sitting  Temperature: 97.2 F, Tympanic  Heart Rate: 85, L Radial  Respiration: 16  O2 Saturation: 97, RA  Pain Scale: 00    Physical Exam  Constitutional: alert, in no acute distress and current vital signs reviewed . On Crutches,.   Head and Face: atraumatic, no deformities, normocephalic, normal facies.   Eyes: no discharge, normal conjunctiva, no eyelid swelling, no ptosis and the sclerae were normal. pupils equal, round and reactive to light and accommodation, conjugate gaze and extraocular movements were intact.   ENT: normal appearing outer ear, normal  appearing nose. examination of the tympanic membrane showed normal landmarks, normal appearing external canal. nasal mucosa moist and pink, no nasal discharge. normal lips. oral mucosa pink and moist, no oral lesions . Throat: Mild erythema. No exudate.   Neck: normal appearing neck, supple neck and no mass was seen. thyroid not enlarged and no thyroid nodules.   Lymphatic: no lymphadenopathy.   Chest: normal chest appearance.   Pulmonary: no respiratory distress, normal respiratory rate and effort and no accessory muscle use. breath sounds clear to auscultation bilaterally.   Cardiovascular: normal rate, no murmurs were heard, regular rhythm, normal S1 and normal S2. Spider veins left calf region. edema was not present in the lower extremities.   Abdomen: soft, nontender, nondistended, normal bowel sounds and no abdominal mass. no hepatomegaly and no splenomegaly. no umbilical hernia was discovered.   Musculoskeletal: normal gait. no musculoskeletal erythema was seen . Right leg: Scar healed. Mild tenderness. no scoliosis. normal range of motion. there was no joint instability noted. muscle strength and tone were normal.   Genitourinary: the scrotum was normal, there were no testicular masses and the testicles were not swollen. the penis was normal. no inguinal hernia was discovered. the prostate was normal . the anus was normal.   Neurologic: cranial nerves grossly intact. normal DTRs. no sensory deficits noted. no coordination deficits. normal gait. muscle strength and tone were normal.   Psychiatric: oriented to person, oriented to place and oriented to time. alert and awake, interactive and mood/affect were appropriate. judgement not impaired. normal attention span. short term memory intact.   Skin, Hair, Nails: normal skin color and pigmentation and no rash. no foot ulcers and no skin ulcer was seen. normal skin turgor. no clubbing or cyanosis of the fingernails.      Immunizations  Influenza --- Series1:  23-Sep-2009; Series2: 21-Oct-2010; Series3: 04-Sep-2014; Series4:  01-Sep-2015; Series5: 05-Oct-2018   Td/DT --- Series1: 11-Oct-2013     Assessment  Cough (R05)  Benign essential hypertension (I10)  Obesity (BMI 30.0-34.9) (E66.9)  Chest pain on breathing (R07.1)    Plan  Plans:     Plan:   Cough: After taking Flu vaccine: Coricidin-HBP as needed for cough.   Chest pain on coughing: Tylenol as needed.  Obesity: Discussed diet,exercise. Reviewed weight goal.. Discussed importance of healthy living.     Hypertension: controlled .   patient education completed on exercise, diet and weight loss.    discussed standard DASH diet.    continued present medication.    recommended to check blood pressure at home.    compliance with medication that is given.    discussed the importance of medications.    Recommended to reduce caffeine intake.    recommended to stop NSAIDS.      Signatures   Electronically signed by : CANDIS Allred; Oct 12 2018  8:35AM CST (Co-author)    Electronically signed by : TG ASHER M.D.; Oct 12 2018  9:21AM CST

## 2023-05-03 NOTE — PROGRESS NOTES
Hemodialysis treatment completed.    Treatment time received: 3 hours    Net fluid removed: 2 liters    Medications received: scheduled hydralazine per MAR    Tolerated Treatment well. VSS. No acute distress.    Blood returned and needles X2 removed. Pressure held till hemostasis obtained.  Placed gauze and tape dressing to site.  Fistual with continued bruit and thrill post treatment.    Report given to Sakina  Refer to flowsheet and MAR for details.  Patient transported back in wheelchair from Dialysis to primary unit.

## 2023-05-03 NOTE — PROGRESS NOTES
[facility] - Intensive Care ([place] [address])  [facility] Medicine  Progress Note    Patient Name: Haroldo Dickinson  MRN: [id number]  Patient Class: OP- Observation   Admission Date: 5/1/2023  Length of Stay: 0 days  Attending Physician: Katey Cheema MD  Primary Care Provider: Tess Ledbetter MD        Subjective:     Principal Problem:Acute respiratory failure with hypoxia        HPI:  Mr Dickinson is a 47 year old male with a PMHX of CAD, HTN, ESRD on HD MWF, currently being worked up for transplant, anemia of CKD, prior CVA, hyperparathyroidism who presents from HD clinic for shortness of breath and palpitations. SOB began yesterday, is worse with lying flat. It is associated with palpitations, a dry cough, and one episode of nausea and vomiting yesterday. He has never experienced palpitations before. He denies chest pain, wheezing, chest tightness, left arm pain, jaw pain, Le edema, abdominal distension, abdominal pain, current N/V, C/D, numbness, focal weakness. Patient endorses dietary non-compliance with fried chicken yesterday. He does not take his blood pressure at home but reports compliance with home medications. He does not make urine. Last HD session Friday. EDW 72.6 kg.    In ED, /107, 91% on 3L, P 92. Afebrile without leukocytosis. CXR with pulmonary edema. BNP 2,396. Troponin 0.247,(0.3 last year) EKG with TWI in inferior leads. Nephrology consulted for HD. Patient given hydralazine 25 mg PO, amlodipine 10 mg, and  and admitted to observation for acute respiratory failure 2/2 hypervolemia.       Overview/Hospital Course:  No notes on file    Interval History: still with high fio2 needs and hypertensive, going for UF this afternoon, echo ordered    Review of Systems   All other systems reviewed and are negative.  Objective:        Physical Exam  Vitals and nursing note reviewed.   Constitutional:       General: He is not in acute distress.     Appearance: He is not toxic-appearing.   HENT:       Head: Normocephalic and atraumatic.   Cardiovascular:      Rate and Rhythm: Normal rate and regular rhythm.   Pulmonary:      Effort: Pulmonary effort is normal.   Neurological:      General: No focal deficit present.      Mental Status: He is alert.   Psychiatric:         Behavior: Behavior normal.         Thought Content: Thought content normal.       Significant Labs: All pertinent labs within the past 24 hours have been reviewed.    Significant Imaging: I have reviewed all pertinent imaging results/findings within the past 24 hours.      Assessment/Plan:      * Acute respiratory failure with hypoxia  Hypervolemia in ESRD  ESRD on HD  Palpitations  HTN urgency  CKD bone mineral disease  2/2 hyperparathyroidism    Patient with Hypoxic Respiratory failure which is Acute.  he is not on home oxygen. Supplemental oxygen was provided and noted- 15L      .   Signs/symptoms of respiratory failure include- tachypnea and wheezing. Contributing diagnoses includes - Pleural effusion and hypervolemia from ESRD Labs and images were reviewed. Patient Has not had a recent ABG. Will treat underlying causes and adjust management of respiratory failure as follows-    Patient sent from HD for SOB and palpitations. Hypervolemic on admit in setting of increased salt intake yesterday. EDW 72.6, weight on admission 76.2.     - Hypertensive urgency with /107, given home meds in ED  - Nephrology consulted for urgent HD   - CXR with pulmonary edema  - currently on 15L NRB -> still on NRB and hypertensive-> UF this afternoon, continue home oral antihypertensives. F/u echo.  - continue home BP medications   - continue home sensipar  - cardiac/ renal diet,1.2L fluid restriction  - re-evaluate for BP titration post HD  - wean O2 as tolerated  - tele    Palpitations  - patient reports palpitations associated with SOB  - EKG with NSR  - tele    Anemia in ESRD (end-stage renal disease)  - Hgb 9.5 on admit, at baseline  - continue to  monitor    Elevated troponin  CAD  - troponin 0.2, down from 0.3 (6/22), will complete trend  - EKG with NSR, TWI in inferior lead  - denies chest pain  - do not suspect ACS at this time  - if uptrends suspect 2/2 to hypertensive urgency and hypervolemia  - will get TTE  - has outpatient cardiac pet at end of July. May be able to move it up  - not currently on statin, will order lipid panel in am  - continue home asa  - tele      Essential hypertension  - continue home amlodipine, coreg, hydralazine  - uncontrolled BP in setting of hypervolemia  - may need medication uptitration if still uncontrolled after HD  - cardiac diet  - hydralazine PRN SBP>180, DBP>110    Hemiparesis affecting right side as late effect of cerebrovascular accident  - continue home ASA, not on a statin      VTE Risk Mitigation (From admission, onward)         Ordered     heparin (porcine) injection 5,000 Units  Every 8 hours         05/01/23 1413     IP VTE HIGH RISK PATIENT  Once         05/01/23 1413     Place sequential compression device  Until discontinued         05/01/23 1413                Discharge Planning   QUIN: 5/3/2023     Code Status: Full Code   Is the patient medically ready for discharge?: No    Reason for patient still in hospital (select all that apply): Patient unstable, Patient trending condition and Treatment  Discharge Plan A: Home   Discharge Delays: None known at this time              Katey Cheema MD  Department of Hospital Medicine   [facility] - Intensive Care ([place])

## 2023-05-03 NOTE — NURSING
3 hours HD tx completed. 1.5 L of fluid removed. Patient tolerated well. Blood returned. Needles pulled. Manual pressure held until hemostasis was achieved. Report given to JUDSON Sanders RN.

## 2023-05-06 NOTE — LETTER
October 1, 2020      Cordelia Singer NP  [address]  [facility]  1st Floor Clinic  [facility] [address]           [facility] 3rd Floor  [address]  [place] [address]  Phone: [phone]          Patient: Haroldo Dickinson   MR Number: [id number]   YOB: 1975   Date of Visit: 10/1/2020       Dear Cordelia Singer:    Thank you for referring Haroldo Dickinson to me for evaluation. Attached you will find relevant portions of my assessment and plan of care.    If you have questions, please do not hesitate to call me. I look forward to following Haroldo Dickinson along with you.    Sincerely,    Kirill Colin MD    Enclosure  CC:  No Recipients    If you would like to receive this communication electronically, please contact [email or web address] or [phone] to request more information on JME[employer] Link access.    For providers and/or their staff who would like to refer a patient to [facility], please contact us through our one-stop-shop provider referral line, [facility], at [phone].    If you feel you have received this communication in error or would no longer like to receive these types of communications, please e-mail [email or web address]          Alert and oriented, no focal deficits, no motor or sensory deficits.

## 2023-05-08 ENCOUNTER — PES CALL (OUTPATIENT)
Dept: ADMINISTRATIVE | Facility: CLINIC | Age: 48
End: 2023-05-08
Payer: MEDICARE

## 2023-05-18 ENCOUNTER — OFFICE VISIT (OUTPATIENT)
Dept: HOME HEALTH SERVICES | Facility: CLINIC | Age: 48
End: 2023-05-18
Payer: MEDICARE

## 2023-05-18 DIAGNOSIS — N28.9 HYPERVOLEMIA ASSOCIATED WITH RENAL INSUFFICIENCY: Primary | ICD-10-CM

## 2023-05-18 DIAGNOSIS — R09.02 HYPOXIA: ICD-10-CM

## 2023-05-18 DIAGNOSIS — E87.70 HYPERVOLEMIA ASSOCIATED WITH RENAL INSUFFICIENCY: Primary | ICD-10-CM

## 2023-05-18 PROCEDURE — 1159F PR MEDICATION LIST DOCUMENTED IN MEDICAL RECORD: ICD-10-PCS | Mod: CPTII,NTX,S$GLB, | Performed by: NURSE PRACTITIONER

## 2023-05-18 PROCEDURE — 3077F SYST BP >= 140 MM HG: CPT | Mod: CPTII,NTX,S$GLB, | Performed by: NURSE PRACTITIONER

## 2023-05-18 PROCEDURE — 3079F PR MOST RECENT DIASTOLIC BLOOD PRESSURE 80-89 MM HG: ICD-10-PCS | Mod: CPTII,NTX,S$GLB, | Performed by: NURSE PRACTITIONER

## 2023-05-18 PROCEDURE — 99348 HOME/RES VST EST LOW MDM 30: CPT | Mod: NTX,S$GLB,, | Performed by: NURSE PRACTITIONER

## 2023-05-18 PROCEDURE — 3066F PR DOCUMENTATION OF TREATMENT FOR NEPHROPATHY: ICD-10-PCS | Mod: CPTII,NTX,S$GLB, | Performed by: NURSE PRACTITIONER

## 2023-05-18 PROCEDURE — 3044F HG A1C LEVEL LT 7.0%: CPT | Mod: CPTII,NTX,S$GLB, | Performed by: NURSE PRACTITIONER

## 2023-05-18 PROCEDURE — 1160F RVW MEDS BY RX/DR IN RCRD: CPT | Mod: CPTII,NTX,S$GLB, | Performed by: NURSE PRACTITIONER

## 2023-05-18 PROCEDURE — 3044F PR MOST RECENT HEMOGLOBIN A1C LEVEL <7.0%: ICD-10-PCS | Mod: CPTII,NTX,S$GLB, | Performed by: NURSE PRACTITIONER

## 2023-05-18 PROCEDURE — 1160F PR REVIEW ALL MEDS BY PRESCRIBER/CLIN PHARMACIST DOCUMENTED: ICD-10-PCS | Mod: CPTII,NTX,S$GLB, | Performed by: NURSE PRACTITIONER

## 2023-05-18 PROCEDURE — 3066F NEPHROPATHY DOC TX: CPT | Mod: CPTII,NTX,S$GLB, | Performed by: NURSE PRACTITIONER

## 2023-05-18 PROCEDURE — 99348 PR HOME VISIT,ESTAB PATIENT,LEVEL II: ICD-10-PCS | Mod: NTX,S$GLB,, | Performed by: NURSE PRACTITIONER

## 2023-05-18 PROCEDURE — 3079F DIAST BP 80-89 MM HG: CPT | Mod: CPTII,NTX,S$GLB, | Performed by: NURSE PRACTITIONER

## 2023-05-18 PROCEDURE — 3077F PR MOST RECENT SYSTOLIC BLOOD PRESSURE >= 140 MM HG: ICD-10-PCS | Mod: CPTII,NTX,S$GLB, | Performed by: NURSE PRACTITIONER

## 2023-05-18 PROCEDURE — 1159F MED LIST DOCD IN RCRD: CPT | Mod: CPTII,NTX,S$GLB, | Performed by: NURSE PRACTITIONER

## 2023-05-22 VITALS
OXYGEN SATURATION: 98 % | RESPIRATION RATE: 14 BRPM | DIASTOLIC BLOOD PRESSURE: 80 MMHG | TEMPERATURE: 98 F | SYSTOLIC BLOOD PRESSURE: 140 MMHG | HEART RATE: 76 BPM

## 2023-05-22 NOTE — ASSESSMENT & PLAN NOTE
--fluid removal with HD appointments  --has adequate transportation to all HD appointments  --denies chest pain, shortness of breath since hospital discharge

## 2023-05-22 NOTE — PROGRESS NOTES
[facility]  Transition of Care Home Visit    Visit Date: 5/18/2023  Encounter Provider: Hermilo Valentin   PCP:  Tess Ledbetter MD    PRESENTING HISTORY      Patient ID: Haroldo Dickinson is a 47 y.o. male.    Consult Requested By:  Dr. Katey Cheema  Reason for Consult:  Hospital Follow Up.    Haroldo is being seen at home due to being seen at home due to physical debility that presents a taxing effort to leave the home, to mitigate high risk of hospital readmission and/or due to the limited availability of reliable or safe options for transportation to the point of access to the provider. Prior to treatment on this visit the chart was reviewed and patient verbal consent was obtained.      Chief Complaint: Follow-up        History of Present Illness: Mr. Haroldo Dickinson is a 47 y.o. male who was recently admitted to the hospital.    * Acute respiratory failure with hypoxia  Hypervolemia in ESRD  ESRD on HD  Palpitations  HTN urgency  CKD bone mineral disease  2/2 hyperparathyroidism     Patient with Hypoxic Respiratory failure which is Acute.  he is not on home oxygen. Supplemental oxygen was provided and noted- 15L       .   Signs/symptoms of respiratory failure include- tachypnea and wheezing. Contributing diagnoses includes - Pleural effusion and hypervolemia from ESRD Labs and images were reviewed. Patient Has not had a recent ABG. Will treat underlying causes and adjust management of respiratory failure as follows-     Patient sent from HD for SOB and palpitations. Hypervolemic on admit in setting of increased salt intake yesterday. EDW 72.6, weight on admission 76.2.      - Hypertensive urgency with /107, given home meds in ED  - Nephrology consulted for urgent HD   - CXR with pulmonary edema  - currently on 15L NRB  - continue home BP medications   - continue home sensipar  - cardiac/ renal diet,1.2L fluid restriction  - re-evaluate for BP titration post HD  - wean O2 as tolerated  - tele      Palpitations  - patient reports palpitations associated with SOB  - EKG with NSR  - tele     Anemia in ESRD (end-stage renal disease)  - Hgb 9.5 on admit, at baseline  - continue to monitor     Elevated troponin  CAD  - troponin 0.2, down from 0.3 (), will complete trend  - EKG with NSR, TWI in inferior lead  - denies chest pain  - do not suspect ACS at this time  - if uptrends suspect 2/2 to hypertensive urgency and hypervolemia  - will get TTE  - has outpatient cardiac pet at end of July. May be able to move it up  - not currently on statin, will order lipid panel in am  - continue home asa  - tele        Essential hypertension  - continue home amlodipine, coreg, hydralazine  - uncontrolled BP in setting of hypervolemia  - may need medication uptitration if still uncontrolled after HD  - cardiac diet  - hydralazine PRN SBP>180, DBP>110     Hemiparesis affecting right side as late effect of cerebrovascular accident  - continue home ASA, not on a statin            VTE Risk Mitigation (From admission, onward)           Ordered       heparin (porcine) injection 5,000 Units  Every 8 hours         23 1413       IP VTE HIGH RISK PATIENT  Once         23 1413       Place sequential compression device  Until discontinued         23 1413            ___________________________________________________________________    Today: With this visit today patient is found sitting up in his living room.  Patient currently sees home health with PT/OT/RN. Denies falls.  Denies smoking, alcohol abuse, illicit drug use. Patient endorses ability to perform ADls. Endorses eating x 3 meals per day, daily BMs, and adequate sleep pattern. Patient is AAOx3, able to verify her name and . Most of her other history was received from her daughter, her medical record and the nursing staff at the Living Facility.       VSS. Denies fever, chest pain, shortness of breath, nausea, vomiting, diarrhea. Risks of environmental  exposure to coronavirus discussed including: social distancing, hand hygiene, and limiting departures from the home for necessities only.  Reports understanding and willingness to comply.  All hospital discharge orders reviewed and being followed, all medications reconciled and reviewed, patient and family verbalized understanding. No other needs identified at this time.      Review of Systems   Constitutional:  Negative for activity change and appetite change.   HENT:  Negative for congestion and dental problem.    Eyes:  Negative for discharge and itching.   Respiratory:  Negative for choking and chest tightness.    Cardiovascular:  Negative for chest pain and palpitations.   Gastrointestinal:  Negative for rectal pain and vomiting.   Endocrine: Negative for cold intolerance and heat intolerance.   Genitourinary:  Negative for enuresis and flank pain.   Musculoskeletal:  Negative for myalgias and neck pain.   Skin:  Negative for color change and wound.   Allergic/Immunologic: Negative for environmental allergies and food allergies.   Neurological:  Negative for tremors and syncope.   Hematological:  Does not bruise/bleed easily.   Psychiatric/Behavioral:  Negative for decreased concentration and dysphoric mood.      PAST HISTORY:     Past Medical History:   Diagnosis Date    CAD (coronary artery disease), native coronary artery 11/21/2019    Cataract     Diabetes mellitus     Diabetic retinopathy     Dialysis patient     DM type 2 causing renal disease, not at goal     ESRD (end stage renal disease) started dialysis 01/2014 06/05/2014    Hyperparathyroidism, secondary renal 06/05/2014    Hypertension     NSTEMI (non-ST elevated myocardial infarction) 12/21/2013    Organ transplant candidate 06/05/2014    Pneumonia     Renal cell carcinoma of right kidney     Renal hypertension     Stroke        Past Surgical History:   Procedure Laterality Date    ANGIOGRAM, CORONARY, WITH LEFT HEART CATHETERIZATION N/A 7/1/2021     Procedure: Angiogram, Coronary, with Left Heart Cath;  Surgeon: Miki Zendejas MD;  Location: [facility] CATH LAB;  Service: Cardiology;  Laterality: N/A;    COLONOSCOPY N/A 5/3/2017    Procedure: COLONOSCOPY;  Surgeon: Rufino Carpenter MD;  Location: [facility] (4TH FLR);  Service: Endoscopy;  Laterality: N/A;  pt states can only schedule on Wednesdays    COLONOSCOPY N/A 2/9/2021    Procedure: COLONOSCOPY;  Surgeon: Edil Wong MD;  Location: [facility] (4TH FLR);  Service: Endoscopy;  Laterality: N/A;  Dialysis MWF/ labwork day of procedure  right arm aceess  per Dr. Colin pt can hold Plavix 5 days prior see note- sm  COVID test on 2/6/21 at [facility] -     COLONOSCOPY N/A 2/10/2021    Procedure: COLONOSCOPY;  Surgeon: Robert Ayers MD;  Location: [facility] (4TH FLR);  Service: Endoscopy;  Laterality: N/A;  rescheduled due to poor bowel prep-BB  negative covid screening 2/6/21-BB  dialysis M-W-F-BB  okay to r/s for 2/9/21 and to hold Plavix per Dr. KIRAN Wong-BB  labs same day-BB    CORONARY STENT PLACEMENT N/A 7/25/2019    Procedure: INSERTION, STENT, CORONARY ARTERY;  Surgeon: Miki Zendejas MD;  Location: [facility] CATH LAB;  Service: Cardiology;  Laterality: N/A;    DIALYSIS FISTULA CREATION      FISTULOGRAM N/A 2/18/2019    Procedure: Fistulogram;  Surgeon: Yaneth De La Cruz MD;  Location: [facility] CATH LAB;  Service: Cardiology;  Laterality: N/A;    FISTULOGRAM Right 7/23/2019    Procedure: Fistulogram;  Surgeon: Oz Cordoba MD;  Location: [facility] CATH LAB;  Service: Cardiology;  Laterality: Right;    LAPAROSCOPIC ROBOT-ASSISTED SURGICAL REMOVAL OF KIDNEY USING DA JAIME XI Right 5/19/2022    Procedure: XI ROBOTIC NEPHRECTOMY;  Surgeon: Aidan Hendricks MD;  Location: [address];  Service: Urology;  Laterality: Right;  3hrs    LAPAROSCOPIC ROBOT-ASSISTED SURGICAL REMOVAL OF KIDNEY USING DA JAIME XI Left 1/5/2023    Procedure: XI ROBOTIC NEPHRECTOMY;  Surgeon: Aidan Hendricks MD;  Location: [facility] OR [facility]  FLR;  Service: Urology;  Laterality: Left;  4 hrs    LEFT HEART CATHETERIZATION Left 7/25/2019    Procedure: Left heart cath;  Surgeon: Miki Zendejas MD;  Location: [facility] CATH LAB;  Service: Cardiology;  Laterality: Left;    REPAIR  1/5/2023    Procedure: REPAIR; COLOTOMY;  Surgeon: Maikel Lamb MD;  [facility];  Service: General;;    RETINAL LASER PROCEDURE Bilateral 2018 or 2017    Dr. Rothman    VASCULAR SURGERY         Family History   Problem Relation Age of Onset    Hypertension Mother     Diabetes Mother     Cataracts Mother     Hypertension Father     Hypertension Sister     Kidney disease Brother     Hypertension Brother     Heart disease Brother     Cancer Maternal Grandfather         Colon CA    Colon cancer Neg Hx     Esophageal cancer Neg Hx     Stomach cancer Neg Hx     Rectal cancer Neg Hx     Ulcerative colitis Neg Hx     Irritable bowel syndrome Neg Hx     Crohn's disease Neg Hx     Celiac disease Neg Hx     Glaucoma Neg Hx     Macular degeneration Neg Hx        Social History     Socioeconomic History    Marital status: Single   Occupational History     Employer: [employer]   Tobacco Use    Smoking status: Never    Smokeless tobacco: Never   Substance and Sexual Activity    Alcohol use: Not Currently     Comment: One drink a month    Drug use: No    Sexual activity: Yes     Partners: Female     Birth control/protection: None   Social History Narrative    Disabled    Was     One son          Social Determinants of Health     Financial Resource Strain: Low Risk     Difficulty of Paying Living Expenses: Not hard at all   Food Insecurity: No Food Insecurity    Worried About Running Out of Food in the Last Year: Never true    Ran Out of Food in the Last Year: Never true   Transportation Needs: No Transportation Needs    Lack of Transportation (Medical): No    Lack of Transportation (Non-Medical): No   Physical Activity: Insufficiently Active    Days of Exercise per  Week: 3 days    Minutes of Exercise per Session: 30 min   Stress: No Stress Concern Present    Feeling of Stress : Not at all   Social Connections: Socially Isolated    Frequency of Communication with Friends and Family: Once a week    Frequency of Social Gatherings with Friends and Family: Once a week    Attends Caoda[employer] Services: More than 4 times per year    Active Member of Clubs or Organizations: No    Attends Club or Organization Meetings: Never    Marital Status:    Housing Stability: Low Risk     Unable to Pay for Housing in the Last Year: No    Number of Places Lived in the Last Year: 1    Unstable Housing in the Last Year: No       MEDICATIONS & ALLERGIES:     Current Outpatient Medications on File Prior to Visit   Medication Sig Dispense Refill    amLODIPine (NORVASC) 10 MG tablet Take 1 tablet (10 mg total) by mouth once daily. 30 tablet 3    aspirin (ECOTRIN) 81 MG EC tablet Take 1 tablet (81 mg total) by mouth once daily. 30 tablet 11    AURYXIA 210 mg iron Tab Take 420 mg by mouth 3 (three) times daily with meals. Take 1 with snacks      carvediloL (COREG) 25 MG tablet TAKE 1 TABLET BY MOUTH TWICE A DAY WITH FOOD 180 tablet 2    cinacalcet (SENSIPAR) 90 MG Tab Take 1 tablet by mouth once daily.      hydrALAZINE (APRESOLINE) 25 MG tablet Take 1 tablet (25 mg total) by mouth every 8 (eight) hours. for SBP > 140mm HG 90 tablet 11    polyethylene glycol (GLYCOLAX) 17 gram/dose powder Use cap to measure 17 g, then mix in liquid and drink by mouth once daily. 238 g 0    sevelamer carbonate (RENVELA) 800 mg Tab Take 800 mg by mouth 3 (three) times daily with meals.       Current Facility-Administered Medications on File Prior to Visit   Medication Dose Route Frequency Provider Last Rate Last Admin    lidocaine (PF) 10 mg/ml (1%) injection 10 mg  1 mL Intradermal Once Michelle Spence MD            Review of patient's allergies indicates:   Allergen Reactions    No known allergies         OBJECTIVE:     Vital Signs:  Vitals:    05/22/23 0719   BP: (!) 140/80   Pulse: 76   Resp: 14   Temp: 97.7 °F (36.5 °C)     There is no height or weight on file to calculate BMI.     Physical Exam:  Physical Exam  Vitals reviewed.   Constitutional:       Appearance: He is well-developed.   HENT:      Head: Normocephalic and atraumatic.   Eyes:      Pupils: Pupils are equal, round, and reactive to light.   Cardiovascular:      Rate and Rhythm: Normal rate.   Pulmonary:      Effort: Pulmonary effort is normal.      Breath sounds: Normal breath sounds.   Abdominal:      General: Bowel sounds are normal.      Palpations: Abdomen is soft.   Musculoskeletal:         General: Normal range of motion.      Cervical back: Normal range of motion and neck supple.   Skin:     General: Skin is warm and dry.      Comments: AV fistula   Neurological:      Mental Status: He is alert. Mental status is at baseline.      GCS: GCS eye subscore is 4. GCS verbal subscore is 5. GCS motor subscore is 6.   Psychiatric:         Attention and Perception: Attention normal.         Mood and Affect: Mood normal.         Speech: Speech normal.     Laboratory  Lab Results   Component Value Date    WBC 7.88 05/03/2023    HGB 9.1 (L) 05/03/2023    HCT 29.9 (L) 05/03/2023    MCV 93 05/03/2023     05/03/2023     Lab Results   Component Value Date    INR 0.9 06/23/2021    INR 1.1 07/25/2019    INR 1.0 03/04/2017     Lab Results   Component Value Date    HGBA1C 4.6 05/02/2023     No results for input(s): POCTGLUCOSE in the last 72 hours.      TRANSITION OF CARE:     Family and/or Caretaker present at visit?  No.  Diagnostic tests reviewed/disposition: No diagnosic tests pending after this hospitalization.  Disease/illness education: Importance of compliance with all prescribed medication and treatments, COVID precautions/Social Distancing/Mask Use  Home health/community services discussion/referrals: Patient has home health established at  unknown .   Establishment or re-establishment of referral orders for community resources: No other necessary community resources.   Discussion with other health care providers: No discussion with other health care providers necessary.     Transition of Care Visit:  I have reviewed and updated the history and problem list.  I have reconciled the medication list.  I have discussed the hospitalization and current medical issues, prognosis and plans with the patient/family.  I  spent more than 50% of time discussing the care with the patient/family.  Total Face-to-Face Encounter: 60 minutes.    Medications Reconciliation:   I have reconciled the patient's home medications and discharge medications with the patient/family. I have updated all changes.  Refer to After-Visit Medication List.    I have discussed discharge plans, follow-up instructions, future appointments, provider contact information, indicators to seek medical emergency treatment, and advisement to call with additional questions or concerns. Patient and caregiver verbalize understanding.    ASSESSMENT & PLAN:       1. Hypervolemia associated with renal insufficiency  Assessment & Plan:  --fluid removal with HD appointments  --has adequate transportation to all HD appointments  --denies chest pain, shortness of breath since hospital discharge        2. Hypoxia  -     Ambulatory referral/consult to [facility]  --denies chest pain or shortness of Breath  --SpO2 greater than 95% on room air       Were controlled substances prescribed?  No    Instructions for the patient:    Scheduled Follow-up :  Future Appointments   Date Time Provider Department Center   5/30/2023 10:30 AM CARDIAC, PET IMAGING [facility] KAYLYNN [facility]   5/30/2023  1:00 PM Hamilton Fraire MD [facility] CARDIO Kofi y   2/8/2024 10:45 AM [facility] OIC-XRAY [facility] X[facility]   2/8/2024 11:00 AM [facility] MRI1 [facility]   2/15/2024 11:00 AM Catrina Zendejas NP  [facility] UROLOGC Kofi Evans       After Visit Medication List :     Medication List            Accurate as of May 18, 2023 11:59 PM. If you have any questions, ask your nurse or doctor.                CONTINUE taking these medications      amLODIPine 10 MG tablet  Commonly known as: NORVASC  Take 1 tablet (10 mg total) by mouth once daily.     aspirin 81 MG EC tablet  Commonly known as: ECOTRIN  Take 1 tablet (81 mg total) by mouth once daily.     AURYXIA 210 mg iron Tab  Generic drug: ferric citrate     carvediloL 25 MG tablet  Commonly known as: COREG  TAKE 1 TABLET BY MOUTH TWICE A DAY WITH FOOD     cinacalcet 90 MG Tab  Commonly known as: SENSIPAR     hydrALAZINE 25 MG tablet  Commonly known as: APRESOLINE  Take 1 tablet (25 mg total) by mouth every 8 (eight) hours. for SBP > 140mm HG     polyethylene glycol 17 gram/dose powder  Commonly known as: GLYCOLAX  Use cap to measure 17 g, then mix in liquid and drink by mouth once daily.     sevelamer carbonate 800 mg Tab  Commonly known as: RENVELA              Signature: Hermilo Valentin NP

## 2023-06-01 NOTE — ASSESSMENT & PLAN NOTE
Hypervolemia in ESRD  ESRD on HD  Palpitations  HTN urgency  CKD bone mineral disease  2/2 hyperparathyroidism    Patient with Hypoxic Respiratory failure which is Acute.  he is not on home oxygen. Supplemental oxygen was provided and noted- 15L      .   Signs/symptoms of respiratory failure include- tachypnea and wheezing. Contributing diagnoses includes - Pleural effusion and hypervolemia from ESRD Labs and images were reviewed. Patient Has not had a recent ABG. Will treat underlying causes and adjust management of respiratory failure as follows-    Patient sent from HD for SOB and palpitations. Hypervolemic on admit in setting of increased salt intake yesterday. EDW 72.6, weight on admission 76.2.     - Hypertensive urgency with /107, given home meds in ED  - Nephrology consulted for urgent HD   - CXR with pulmonary edema  - currently on 15L NRB -> still on NRB and hypertensive-> UF ordered by Nephrology, home medications continued.  TTE 5/1/23:   The left ventricle is normal in size with concentric hypertrophy and normal systolic function.   The estimated ejection fraction is 60%.   Grade III left ventricular diastolic dysfunction.   Mild mitral regurgitation.   Normal right ventricular size with normal right ventricular systolic function.   The estimated PA systolic pressure is 41 mmHg.   Intermediate central venous pressure (8 mmHg).   Severe left atrial enlargement.   Trivial circumferential pericardial effusion.   There is pulmonary hypertension.    - continue home BP medications   - continue home sensipar  - cardiac/ renal diet,1.2L fluid restriction  - re-evaluate for BP titration post HD  - wean O2 as tolerated      Discharged home once dry weight achieved and BP stabilized.

## 2023-06-01 NOTE — DISCHARGE SUMMARY
[facility] - Intensive Care ([place] [address])  [facility] Medicine  Discharge Summary      Patient Name: Haroldo Dickinson  MRN: [id number]  SADIE: [id number]  Patient Class: OP- Observation  Admission Date: 5/1/2023  Hospital Length of Stay: 0 days  Discharge Date and Time: 5/3/2023  5:22 PM  Discharging Provider: Katey Cheema MD  Primary Care Provider: Tess Ledbetter MD  [facility] Medicine Team: [facility] Katey Cheema MD  Primary Care Team: [facility] MED S    HPI:   Mr Dickinson is a 47 year old male with a PMHX of CAD, HTN, ESRD on HD MWF, currently being worked up for transplant, anemia of CKD, prior CVA, hyperparathyroidism who presents from HD clinic for shortness of breath and palpitations. SOB began yesterday, is worse with lying flat. It is associated with palpitations, a dry cough, and one episode of nausea and vomiting yesterday. He has never experienced palpitations before. He denies chest pain, wheezing, chest tightness, left arm pain, jaw pain, Le edema, abdominal distension, abdominal pain, current N/V, C/D, numbness, focal weakness. Patient endorses dietary non-compliance with fried chicken yesterday. He does not take his blood pressure at home but reports compliance with home medications. He does not make urine. Last HD session Friday. EDW 72.6 kg.    In ED, /107, 91% on 3L, P 92. Afebrile without leukocytosis. CXR with pulmonary edema. BNP 2,396. Troponin 0.247,(0.3 last year) EKG with TWI in inferior leads. Nephrology consulted for HD. Patient given hydralazine 25 mg PO, amlodipine 10 mg, and  and admitted to observation for acute respiratory failure 2/2 hypervolemia.       * No surgery found *      Hospital Course:   No notes on file     Goals of Care Treatment Preferences:  Code Status: Full Code      Consults:   Consults (From admission, onward)        Status Ordering Provider     Inpatient consult to Nephrology  Once        Provider:  (Not yet assigned)    Completed TERE ARCEO           Pulmonary  * Acute respiratory failure with hypoxia  Hypervolemia in ESRD  ESRD on HD  Palpitations  HTN urgency  CKD bone mineral disease  2/2 hyperparathyroidism    Patient with Hypoxic Respiratory failure which is Acute.  he is not on home oxygen. Supplemental oxygen was provided and noted- 15L      .   Signs/symptoms of respiratory failure include- tachypnea and wheezing. Contributing diagnoses includes - Pleural effusion and hypervolemia from ESRD Labs and images were reviewed. Patient Has not had a recent ABG. Will treat underlying causes and adjust management of respiratory failure as follows-    Patient sent from HD for SOB and palpitations. Hypervolemic on admit in setting of increased salt intake yesterday. EDW 72.6, weight on admission 76.2.     - Hypertensive urgency with /107, given home meds in ED  - Nephrology consulted for urgent HD   - CXR with pulmonary edema  - currently on 15L NRB -> still on NRB and hypertensive-> UF ordered by Nephrology, home medications continued.  TTE 5/1/23:   The left ventricle is normal in size with concentric hypertrophy and normal systolic function.   The estimated ejection fraction is 60%.   Grade III left ventricular diastolic dysfunction.   Mild mitral regurgitation.   Normal right ventricular size with normal right ventricular systolic function.   The estimated PA systolic pressure is 41 mmHg.   Intermediate central venous pressure (8 mmHg).   Severe left atrial enlargement.   Trivial circumferential pericardial effusion.   There is pulmonary hypertension.    - continue home BP medications   - continue home sensipar  - cardiac/ renal diet,1.2L fluid restriction  - re-evaluate for BP titration post HD  - wean O2 as tolerated      Discharged home once dry weight achieved and BP stabilized.      Final Active Diagnoses:    Diagnosis Date Noted POA    PRINCIPAL PROBLEM:  Acute respiratory failure with hypoxia [J96.01] 12/21/2013 Yes    End stage  renal disease [N18.6] 05/03/2023 Yes    Acute on chronic diastolic heart failure [I50.33] 05/02/2023 Yes    Palpitations [R00.2] 05/01/2023 Yes    Anemia in ESRD (end-stage renal disease) [N18.6, D63.1] 06/14/2022 Yes    Chronic kidney disease-mineral and bone disorder [N18.9, E83.9, M89.9] 06/14/2022 Yes    Hypertensive urgency [I16.0] 06/13/2022 Yes    Hypervolemia associated with renal insufficiency [E87.70, N28.9] 06/13/2022 Yes    Elevated troponin [R77.8] 06/13/2022 Yes    Essential hypertension [I10] 11/21/2019 Yes    CAD (coronary artery disease), native coronary artery [I25.10] 11/21/2019 Yes    Renal hypertension [I12.9]  Yes    Hemiparesis affecting right side as late effect of cerebrovascular accident [I69.351] 02/15/2016 Not Applicable    End stage renal failure on dialysis [N18.6, Z99.2] 06/05/2014 Not Applicable      Problems Resolved During this Admission:       Discharged Condition: stable    Disposition: Home or Self Care    Follow Up:   Follow-up Information     Tess Ledbetter MD Follow up on 5/11/2023.    Specialty: General Practice  Why: May 11 @ 8:30 AM  Please bring ID and dicharge summary.  Arrive 15 min. early  [facility] information:  [address] [facility]  [facility] [address]  [phone]                       Patient Instructions:      Ambulatory referral/consult to [facility]   Standing Status: Future   Referral Priority: Routine Referral Type: Consultation   Referral Reason: Specialty Services Required   Number of Visits Requested: 1       Significant Diagnostic Studies: summarized above    Pending Diagnostic Studies:     None         Medications:  Reconciled Home Medications:      Medication List      CONTINUE taking these medications    amLODIPine 10 MG tablet  Commonly known as: NORVASC  Take 1 tablet (10 mg total) by mouth once daily.     aspirin 81 MG EC tablet  Commonly known as: ECOTRIN  Take 1 tablet (81 mg total) by mouth  once daily.     AURYXIA 210 mg iron Tab  Generic drug: ferric citrate  Take 420 mg by mouth 3 (three) times daily with meals. Take 1 with snacks     carvediloL 25 MG tablet  Commonly known as: COREG  TAKE 1 TABLET BY MOUTH TWICE A DAY WITH FOOD     cinacalcet 90 MG Tab  Commonly known as: SENSIPAR  Take 1 tablet by mouth once daily.     hydrALAZINE 25 MG tablet  Commonly known as: APRESOLINE  Take 1 tablet (25 mg total) by mouth every 8 (eight) hours. for SBP > 140mm HG     polyethylene glycol 17 gram/dose powder  Commonly known as: GLYCOLAX  Use cap to measure 17 g, then mix in liquid and drink by mouth once daily.     sevelamer carbonate 800 mg Tab  Commonly known as: RENVELA  Take 800 mg by mouth 3 (three) times daily with meals.        STOP taking these medications    OPW TEST CLAIM - DO NOT FILL            Indwelling Lines/Drains at time of discharge:   Lines/Drains/Airways     Drain  Duration                Hemodialysis AV Fistula Right forearm -- days                Time spent on the discharge of patient: 25 minutes         Katey Cheema MD  Department of Hospital Medicine  [facility] - Intensive Care ([place]-)

## 2023-08-09 PROCEDURE — 99001 SPECIMEN HANDLING PT-LAB: CPT | Mod: PO | Performed by: NURSE PRACTITIONER

## 2023-08-10 ENCOUNTER — TELEPHONE (OUTPATIENT)
Dept: TRANSPLANT | Facility: CLINIC | Age: 48
End: 2023-08-10
Payer: MEDICARE

## 2023-08-10 NOTE — TELEPHONE ENCOUNTER
WILIAN received message from listed transplant nurse coordinator Miguelina QUINN RN indicating concerns regarding pt's ongoing difficulties attending appointments needed for transplant listing reactivation due to lack of transportation.  Per message, pt has finally attended urology appointment but is still needing to attend cardiology appointment.  This SW notes, per chart review, that patient has been made 'inactive' on transplant wait list twice in the past due to lack of adequate caregiver support.  Pt has also presented to transplant clinic update visits with different caregivers and has listed different transplant caregivers each time.    WILIAN contacted the SW at patient's dialysis unit ([facility]) to inquire about any known concerns affecting pt's dialysis treatment at this time.  AKASH CEDENO confirms having same concerns as transplant team (e.g. unstable social support and unstable transportation support).  AKASH CEDENO reports pt is able to maintain dialysis adherence only if Medicaid / RTA transportation is in place.  When that falls through, pt does not have the capacity to make alternative arrangements (due to cognitive deficits related to stroke) or have any oversight from caregivers to intervene for assistance.  AKASH CEDENO also noted that pt's brother is also a dialysis patient there and has the same exact chair time as pt.  In spite of this, pt will still fail to show to dialysis treatments even when his brother shows.  AKASH CEDENO confided her own belief that, although he is compliant currently, pt would not be able to maintain a transplanted kidney long-term given his history and social circumstances.  WILIAN relayed this information to Miguelina and transplant nephrologist Dr. Mccray.  Pt's case to be discussed in upcoming transplant committee meeting regarding appropriate transplant listing status.

## 2023-08-16 ENCOUNTER — LAB VISIT (OUTPATIENT)
Dept: LAB | Facility: HOSPITAL | Age: 48
End: 2023-08-16
Payer: MEDICARE

## 2023-08-16 DIAGNOSIS — Z76.82 ORGAN TRANSPLANT CANDIDATE: ICD-10-CM

## 2023-08-18 ENCOUNTER — COMMITTEE REVIEW (OUTPATIENT)
Dept: TRANSPLANT | Facility: CLINIC | Age: 48
End: 2023-08-18
Payer: MEDICARE

## 2023-08-18 NOTE — COMMITTEE REVIEW
Native Organ Dx: Diabetes Mellitus - Type II      Not approved for LRD/CAD transplant due to lack of consistent, adequate caregiver support and transportation to all transplant related appointments.  Patient will be removed from the wait list.    Pending Cardiology clearance - no showed appts due to transportation issues. Multiple no shows in the past due to transportation issues. Mother states she is caregiver but they do not have transportation.     Note written by Miguelina Baum RN    ===============================================

## 2023-08-18 NOTE — LETTER
August 18, 2023    Haroldo Dickinson  [address]  [address]  [place] [address]    Dear Haroldo Dickinson:  MRN: [id number]    It is the duty of the [facility] Selection [facility] to determine which patients are candidates for a transplant. For this reason, our committee has the difficult task of evaluating patients to determine which ones have the greatest chance of having a successful transplant. We are aware of the magnitude of this responsibility, and we approach it with reverence and humility.    Your current health status was reviewed at a recent selection committee meeting.  It is with regret I inform you that you are no longer a suitable transplant candidate because of lack of adequate caregiver support and transportation. You can be re-referred for evaluation after you have established consistent, dedicated caregiver support with someone who can also provide transportation to all appointments.  Your name has been removed from the wait list effective August 18, 2023.    The [facility] carefully considers each patient's transplant candidacy and determines whether it is safe to proceed with transplantation on a case-by-case basis using established selection criteria.  In the past, you were considered to be a suitable transplant candidate.  At present, the risk of proceeding with an elective transplant surgery has become too high.                                                                                                 Although the selection committee believes you are not a suitable transplant candidate, you have the option to be evaluated at other transplant centers.  You may request your [facility] records be sent to any center of your choice by contacting our Medical Records Department at [phone].                                                                               Attached is a letter from the [employer] for Organ [employer]aring (UNOS).   It describes the services and information offered to patients by UN[employer] and the Organ Procurement and Transplant Network.                                                                                                                                      The [facility] sincerely wishes you the best and remains available to answer any questions.  Please do not hesitate to contact our pre-transplant office if we can assist you in any other way.                                                                               Sincerely,      nAu Hassan MD  Medical Director, Kidney & Kidney/Pancreas Transplantation  lh/enclosure    Cc: Dr. Otis Bryant         [facility]               The Organ Procurement and Transplantation Network   Toll-free patient services line: [phone]  Your resource for organ transplant information      Staffed 8:30 am - 5:00 pm ET Monday - Friday   Leave a message 24/7 to receive a call back    The Organ Procurement and Transplantation Network (OPTN) is the national transplant system. It makes the policies that decide how donated organs are matched to patients waiting for a transplant. The OPTN:    Makes sure donated organs get matched to people on the transplant waiting list  Tells people about the donation and transplant processes  Makes sure that the public knows about the need for more organ and tissue donations    The OPTN has a free patient services line that you can call to:  Get more information about:   o Organ donation and organ transplants   o Donation and transplant policies  Get an information kit with:   o A list of transplant hospitals   o Waiting list information  Talk about any questions you may have about your transplant hospital or organ procurement organization. The staff will do their best to help you or point you to others who may help.  Find out how you can volunteer with the OPTN and help shape transplant  policy    The patient services line number is: 3-582-744-6626    Patient services line staff CANNOT answer questions about your own medical care, including:  Waiting list status  Test results  Medical records  You will need to call your transplant hospital for this information.    The following websites have more information about transplantation and donation:  OPTN: https://optn.transplant.hrsa.gov/  For potential living donors and transplant recipients:   o Living with transplant: https://www.transplantliving.org/   o Living donation process: https://optn.transplant.hrsa.gov/living-donation/     o Financial assistance: https://www.livingdonorassistance.org/  Transplantation data: https://www.srtr.org/  Organ donation: https://www.organdonor.gov/    Volunteer with the OPTN: https://optn.transplant.hrsa.gov/get-involved

## 2023-09-05 ENCOUNTER — OFFICE VISIT (OUTPATIENT)
Dept: PODIATRY | Facility: CLINIC | Age: 48
End: 2023-09-05
Payer: MEDICARE

## 2023-09-05 VITALS — WEIGHT: 154.13 LBS | BODY MASS INDEX: 24.19 KG/M2 | HEIGHT: 67 IN

## 2023-09-05 DIAGNOSIS — E11.22 CONTROLLED TYPE 2 DIABETES MELLITUS WITH CHRONIC KIDNEY DISEASE ON CHRONIC DIALYSIS, WITHOUT LONG-TERM CURRENT USE OF INSULIN: Primary | ICD-10-CM

## 2023-09-05 DIAGNOSIS — N18.6 CONTROLLED TYPE 2 DIABETES MELLITUS WITH CHRONIC KIDNEY DISEASE ON CHRONIC DIALYSIS, WITHOUT LONG-TERM CURRENT USE OF INSULIN: Primary | ICD-10-CM

## 2023-09-05 DIAGNOSIS — B35.1 ONYCHOMYCOSIS DUE TO DERMATOPHYTE: ICD-10-CM

## 2023-09-05 DIAGNOSIS — Z99.2 CONTROLLED TYPE 2 DIABETES MELLITUS WITH CHRONIC KIDNEY DISEASE ON CHRONIC DIALYSIS, WITHOUT LONG-TERM CURRENT USE OF INSULIN: Primary | ICD-10-CM

## 2023-09-05 PROCEDURE — 99214 OFFICE O/P EST MOD 30 MIN: CPT | Mod: S$GLB,,, | Performed by: PODIATRIST

## 2023-09-05 PROCEDURE — 3044F HG A1C LEVEL LT 7.0%: CPT | Mod: CPTII,S$GLB,, | Performed by: PODIATRIST

## 2023-09-05 PROCEDURE — 3008F BODY MASS INDEX DOCD: CPT | Mod: CPTII,S$GLB,, | Performed by: PODIATRIST

## 2023-09-05 PROCEDURE — 3044F PR MOST RECENT HEMOGLOBIN A1C LEVEL <7.0%: ICD-10-PCS | Mod: CPTII,S$GLB,, | Performed by: PODIATRIST

## 2023-09-05 PROCEDURE — 99999 PR PBB SHADOW E&M-EST. PATIENT-LVL III: ICD-10-PCS | Mod: PBBFAC,,, | Performed by: PODIATRIST

## 2023-09-05 PROCEDURE — 99214 PR OFFICE/OUTPT VISIT, EST, LEVL IV, 30-39 MIN: ICD-10-PCS | Mod: S$GLB,,, | Performed by: PODIATRIST

## 2023-09-05 PROCEDURE — 3066F PR DOCUMENTATION OF TREATMENT FOR NEPHROPATHY: ICD-10-PCS | Mod: CPTII,S$GLB,, | Performed by: PODIATRIST

## 2023-09-05 PROCEDURE — 3066F NEPHROPATHY DOC TX: CPT | Mod: CPTII,S$GLB,, | Performed by: PODIATRIST

## 2023-09-05 PROCEDURE — 3008F PR BODY MASS INDEX (BMI) DOCUMENTED: ICD-10-PCS | Mod: CPTII,S$GLB,, | Performed by: PODIATRIST

## 2023-09-05 PROCEDURE — 1159F MED LIST DOCD IN RCRD: CPT | Mod: CPTII,S$GLB,, | Performed by: PODIATRIST

## 2023-09-05 PROCEDURE — 99999 PR PBB SHADOW E&M-EST. PATIENT-LVL III: CPT | Mod: PBBFAC,,, | Performed by: PODIATRIST

## 2023-09-05 PROCEDURE — 1159F PR MEDICATION LIST DOCUMENTED IN MEDICAL RECORD: ICD-10-PCS | Mod: CPTII,S$GLB,, | Performed by: PODIATRIST

## 2023-09-05 RX ORDER — CICLOPIROX 80 MG/ML
SOLUTION TOPICAL NIGHTLY
Qty: 6.6 ML | Refills: 3 | Status: SHIPPED | OUTPATIENT
Start: 2023-09-05

## 2023-09-05 NOTE — PROGRESS NOTES
Subjective:     Patient ID: Haroldo Dickinson is a 47 y.o. male.    Chief Complaint: Diabetes Mellitus (12/29/22 Dr De Anda) and Nail Care    Haroldo is a 47 y.o. male who presents to the clinic upon referral from Dr. Marshall  for evaluation and treatment of diabetic feet. Haroldo has a past medical history of CAD (coronary artery disease), native coronary artery (11/21/2019), Cataract, Diabetes mellitus, Diabetic retinopathy, Dialysis patient, DM type 2 causing renal disease, not at goal, ESRD (end stage renal disease) started dialysis 01/2014 (06/05/2014), Hyperparathyroidism, secondary renal (06/05/2014), Hypertension, NSTEMI (non-ST elevated myocardial infarction) (12/21/2013), Organ transplant candidate (06/05/2014), Pneumonia, Renal cell carcinoma of right kidney, Renal hypertension, and Stroke. Presents for diabetic foot risk assessment.     PCP: Tess Ledbetter MD    Date Last Seen by PCP: per above    Current shoe gear: Tennis shoes    Hemoglobin A1C   Date Value Ref Range Status   05/02/2023 4.6 4.0 - 5.6 % Final     Comment:     ADA Screening Guidelines:  5.7-6.4%  Consistent with prediabetes  >or=6.5%  Consistent with diabetes    High levels of fetal hemoglobin interfere with the HbA1C  assay. Heterozygous hemoglobin variants (HbS, HgC, etc)do  not significantly interfere with this assay.   However, presence of multiple variants may affect accuracy.     12/29/2022 4.9 4.0 - 5.6 % Final     Comment:     ADA Screening Guidelines:  5.7-6.4%  Consistent with prediabetes  >or=6.5%  Consistent with diabetes    High levels of fetal hemoglobin interfere with the HbA1C  assay. Heterozygous hemoglobin variants (HbS, HgC, etc)do  not significantly interfere with this assay.   However, presence of multiple variants may affect accuracy.     05/12/2022 4.7 4.0 - 5.6 % Final     Comment:     ADA Screening Guidelines:  5.7-6.4%  Consistent with prediabetes  >or=6.5%  Consistent with diabetes    High levels of fetal hemoglobin  interfere with the HbA1C  assay. Heterozygous hemoglobin variants (HbS, HgC, etc)do  not significantly interfere with this assay.   However, presence of multiple variants may affect accuracy.           Review of Systems   Constitutional: Negative for chills.   Cardiovascular:  Negative for chest pain and claudication.   Respiratory:  Negative for cough.    Skin:  Positive for color change, dry skin and nail changes.   Musculoskeletal:  Positive for joint pain.   Gastrointestinal:  Negative for nausea.   Neurological:  Positive for paresthesias. Negative for numbness.   Psychiatric/Behavioral:  The patient is not nervous/anxious.         Objective:     Physical Exam  Constitutional:       Appearance: He is well-developed.      Comments: Oriented to time, place, and person.   Cardiovascular:      Comments: DP and PT pulses are palpable bilaterally. 3 sec capillary refill time and toes and feet are warm to touch proximally .  There is  hair growth on the feet and toes b/l. There is no edema b/l. No spider veins or varicosities present b/l.     Musculoskeletal:      Comments: Equinus noted b/l ankles with < 10 deg DF noted. MMT 5/5 in DF/PF/Inv/Ev resistance with no reproduction of pain in any direction. Passive range of motion of ankle and pedal joints is painless b/l.     Feet:      Right foot:      Skin integrity: No callus or dry skin.      Left foot:      Skin integrity: No callus or dry skin.   Lymphadenopathy:      Comments: Negative lymphadenopathy bilateral popliteal fossa and tarsal tunnel.   Skin:     Comments: No open lesions, lacerations or wounds noted.Interdigital spaces clean, dry and intact b/l. No erythema noted to b/l foot.    Toenails 1-5 bilaterally are elongated by 2-3 mm, thickened by 2-3 mm, discolored/yellowed, dystrophic, brittle with subungual debris.     Neurological:      Mental Status: He is alert.      Comments: Light touch, proprioception, and sharp/dull sensation are all intact  bilaterally. Protective threshold with the [employer] monofilament is intact bilaterally.    Psychiatric:         Behavior: Behavior is cooperative.           Assessment:      Encounter Diagnoses   Name Primary?    Controlled type 2 diabetes mellitus with chronic kidney disease on chronic dialysis, without long-term current use of insulin Yes    Onychomycosis due to dermatophyte      Plan:     Haroldo was seen today for diabetes mellitus and nail care.    Diagnoses and all orders for this visit:    Controlled type 2 diabetes mellitus with chronic kidney disease on chronic dialysis, without long-term current use of insulin    Onychomycosis due to dermatophyte    Other orders  -     ciclopirox (PENLAC) 8 % Soln; Apply topically nightly.      I counseled the patient on his conditions, their implications and medical management.      - Shoe inspection. Diabetic Foot Education. Patient reminded of the importance of good nutrition and blood sugar control to help prevent podiatric complications of diabetes. Patient instructed on proper foot hygeine. We discussed wearing proper shoe gear, daily foot inspections, never walking without protective shoe gear, never putting sharp instruments to feet, routine podiatric nail visits every 4-6 months.   - With patient's permission, nails were aggressively reduced and debrided x 10 to their soft tissue attachment mechanically and with electric , removing all offending nail and debris. Patient relates relief following the procedure. He will continue to monitor the areas daily, inspect his feet, wear protective shoe gear when ambulatory, moisturizer to maintain skin integrity and follow in this office in approximately 4-6 months, sooner p.r.n.     At patient's request, I discussed different treatments for toenail fungus. We discussed oral antifungals but I did not recommend them as a first line treatment since the medication is taken internally and can have side effects such  as rash, taste disturbances, and liver enzyme elevation. We discussed topical Penlac to be applied daily and removed weekly. Pt. Expresses understanding and would like to try the Penlac. Rx sent to the pharmacy.

## 2023-09-05 NOTE — PATIENT INSTRUCTIONS
Kerasal for fungal nails apply daily to all toenails.  Can be found at [facility]

## 2023-09-11 LAB
HLA FREEZE AND HOLD INTERPRETATION: NORMAL
HLAFH COLLECTION DATE: NORMAL
HPRA INTERPRETATION: NORMAL

## 2023-11-27 ENCOUNTER — HOSPITAL ENCOUNTER (INPATIENT)
Facility: HOSPITAL | Age: 48
LOS: 1 days | Discharge: HOME | DRG: 280 | End: 2023-11-29
Attending: EMERGENCY MEDICINE | Admitting: HOSPITALIST
Payer: MEDICARE

## 2023-11-27 DIAGNOSIS — N18.6 ESRD ON HEMODIALYSIS: ICD-10-CM

## 2023-11-27 DIAGNOSIS — R07.9 CHEST PAIN: ICD-10-CM

## 2023-11-27 DIAGNOSIS — Z99.2 ESRD ON HEMODIALYSIS: ICD-10-CM

## 2023-11-27 DIAGNOSIS — D64.9 ANEMIA, UNSPECIFIED TYPE: ICD-10-CM

## 2023-11-27 DIAGNOSIS — N19 UREMIA: ICD-10-CM

## 2023-11-27 DIAGNOSIS — I21.4 NSTEMI (NON-ST ELEVATED MYOCARDIAL INFARCTION): Primary | ICD-10-CM

## 2023-11-27 DIAGNOSIS — E87.5 HYPERKALEMIA: ICD-10-CM

## 2023-11-27 PROBLEM — I16.1 HYPERTENSIVE EMERGENCY: Status: ACTIVE | Noted: 2023-11-27

## 2023-11-27 LAB
ALBUMIN SERPL BCP-MCNC: 3.2 G/DL (ref 3.5–5.2)
ALP SERPL-CCNC: 78 U/L (ref 55–135)
ALT SERPL W/O P-5'-P-CCNC: 15 U/L (ref 10–44)
ANION GAP SERPL CALC-SCNC: 13 MMOL/L (ref 8–16)
ANION GAP SERPL CALC-SCNC: 13 MMOL/L (ref 8–16)
ANION GAP SERPL CALC-SCNC: 15 MMOL/L (ref 8–16)
ANION GAP SERPL CALC-SCNC: 17 MMOL/L (ref 8–16)
APTT PPP: 26.4 SEC (ref 21–32)
APTT PPP: 33.6 SEC (ref 21–32)
APTT PPP: 58.4 SEC (ref 21–32)
AST SERPL-CCNC: 11 U/L (ref 10–40)
BASOPHILS # BLD AUTO: 0.1 K/UL (ref 0–0.2)
BASOPHILS # BLD AUTO: 0.1 K/UL (ref 0–0.2)
BASOPHILS NFR BLD: 1 % (ref 0–1.9)
BASOPHILS NFR BLD: 1 % (ref 0–1.9)
BILIRUB SERPL-MCNC: 0.7 MG/DL (ref 0.1–1)
BNP SERPL-MCNC: 179 PG/ML (ref 0–99)
BUN SERPL-MCNC: 101 MG/DL (ref 6–20)
BUN SERPL-MCNC: 101 MG/DL (ref 6–20)
BUN SERPL-MCNC: 104 MG/DL (ref 6–20)
BUN SERPL-MCNC: 44 MG/DL (ref 6–20)
CALCIUM SERPL-MCNC: 8.1 MG/DL (ref 8.7–10.5)
CALCIUM SERPL-MCNC: 8.2 MG/DL (ref 8.7–10.5)
CALCIUM SERPL-MCNC: 8.3 MG/DL (ref 8.7–10.5)
CALCIUM SERPL-MCNC: 8.4 MG/DL (ref 8.7–10.5)
CHLORIDE SERPL-SCNC: 100 MMOL/L (ref 95–110)
CHLORIDE SERPL-SCNC: 106 MMOL/L (ref 95–110)
CHLORIDE SERPL-SCNC: 110 MMOL/L (ref 95–110)
CHLORIDE SERPL-SCNC: 111 MMOL/L (ref 95–110)
CO2 SERPL-SCNC: 16 MMOL/L (ref 23–29)
CO2 SERPL-SCNC: 17 MMOL/L (ref 23–29)
CO2 SERPL-SCNC: 18 MMOL/L (ref 23–29)
CO2 SERPL-SCNC: 24 MMOL/L (ref 23–29)
CREAT SERPL-MCNC: 13.8 MG/DL (ref 0.5–1.4)
CREAT SERPL-MCNC: 14 MG/DL (ref 0.5–1.4)
CREAT SERPL-MCNC: 14.8 MG/DL (ref 0.5–1.4)
CREAT SERPL-MCNC: 7.7 MG/DL (ref 0.5–1.4)
DIFFERENTIAL METHOD: ABNORMAL
DIFFERENTIAL METHOD: ABNORMAL
EOSINOPHIL # BLD AUTO: 0.4 K/UL (ref 0–0.5)
EOSINOPHIL # BLD AUTO: 0.5 K/UL (ref 0–0.5)
EOSINOPHIL NFR BLD: 4 % (ref 0–8)
EOSINOPHIL NFR BLD: 5.3 % (ref 0–8)
ERYTHROCYTE [DISTWIDTH] IN BLOOD BY AUTOMATED COUNT: 14.9 % (ref 11.5–14.5)
ERYTHROCYTE [DISTWIDTH] IN BLOOD BY AUTOMATED COUNT: 15 % (ref 11.5–14.5)
EST. GFR  (NO RACE VARIABLE): 3.7 ML/MIN/1.73 M^2
EST. GFR  (NO RACE VARIABLE): 4 ML/MIN/1.73 M^2
EST. GFR  (NO RACE VARIABLE): 4 ML/MIN/1.73 M^2
EST. GFR  (NO RACE VARIABLE): 8.1 ML/MIN/1.73 M^2
ESTIMATED AVG GLUCOSE: 100 MG/DL (ref 68–131)
FERRITIN SERPL-MCNC: 1531 NG/ML (ref 20–300)
GLUCOSE SERPL-MCNC: 110 MG/DL (ref 70–110)
GLUCOSE SERPL-MCNC: 117 MG/DL (ref 70–110)
GLUCOSE SERPL-MCNC: 179 MG/DL (ref 70–110)
GLUCOSE SERPL-MCNC: 221 MG/DL (ref 70–110)
GLUCOSE SERPL-MCNC: 80 MG/DL (ref 70–110)
HBA1C MFR BLD: 5.1 % (ref 4–5.6)
HBV CORE AB SERPL QL IA: NORMAL
HBV SURFACE AB SER-ACNC: 17.4 MIU/ML
HBV SURFACE AB SER-ACNC: REACTIVE M[IU]/ML
HBV SURFACE AG SERPL QL IA: NORMAL
HCT VFR BLD AUTO: 27.5 % (ref 40–54)
HCT VFR BLD AUTO: 28.2 % (ref 40–54)
HGB BLD-MCNC: 8.6 G/DL (ref 14–18)
HGB BLD-MCNC: 8.6 G/DL (ref 14–18)
IMM GRANULOCYTES # BLD AUTO: 0.08 K/UL (ref 0–0.04)
IMM GRANULOCYTES # BLD AUTO: 0.11 K/UL (ref 0–0.04)
IMM GRANULOCYTES NFR BLD AUTO: 0.8 % (ref 0–0.5)
IMM GRANULOCYTES NFR BLD AUTO: 1.1 % (ref 0–0.5)
INR PPP: 1 (ref 0.8–1.2)
IRON SERPL-MCNC: 104 UG/DL (ref 45–160)
LYMPHOCYTES # BLD AUTO: 1.8 K/UL (ref 1–4.8)
LYMPHOCYTES # BLD AUTO: 1.8 K/UL (ref 1–4.8)
LYMPHOCYTES NFR BLD: 17.7 % (ref 18–48)
LYMPHOCYTES NFR BLD: 18.4 % (ref 18–48)
MCH RBC QN AUTO: 29.8 PG (ref 27–31)
MCH RBC QN AUTO: 30.2 PG (ref 27–31)
MCHC RBC AUTO-ENTMCNC: 30.5 G/DL (ref 32–36)
MCHC RBC AUTO-ENTMCNC: 31.3 G/DL (ref 32–36)
MCV RBC AUTO: 97 FL (ref 82–98)
MCV RBC AUTO: 98 FL (ref 82–98)
MONOCYTES # BLD AUTO: 0.7 K/UL (ref 0.3–1)
MONOCYTES # BLD AUTO: 1 K/UL (ref 0.3–1)
MONOCYTES NFR BLD: 7 % (ref 4–15)
MONOCYTES NFR BLD: 9.9 % (ref 4–15)
NEUTROPHILS # BLD AUTO: 6.6 K/UL (ref 1.8–7.7)
NEUTROPHILS # BLD AUTO: 6.6 K/UL (ref 1.8–7.7)
NEUTROPHILS NFR BLD: 65 % (ref 38–73)
NEUTROPHILS NFR BLD: 68.8 % (ref 38–73)
NRBC BLD-RTO: 0 /100 WBC
NRBC BLD-RTO: 0 /100 WBC
PLATELET # BLD AUTO: 296 K/UL (ref 150–450)
PLATELET # BLD AUTO: 316 K/UL (ref 150–450)
PMV BLD AUTO: 9.8 FL (ref 9.2–12.9)
PMV BLD AUTO: 9.8 FL (ref 9.2–12.9)
POCT GLUCOSE: 179 MG/DL (ref 70–110)
POCT GLUCOSE: 93 MG/DL (ref 70–110)
POTASSIUM SERPL-SCNC: 3.8 MMOL/L (ref 3.5–5.1)
POTASSIUM SERPL-SCNC: 4.7 MMOL/L (ref 3.5–5.1)
POTASSIUM SERPL-SCNC: 5.3 MMOL/L (ref 3.5–5.1)
POTASSIUM SERPL-SCNC: 5.7 MMOL/L (ref 3.5–5.1)
PROT SERPL-MCNC: 7.1 G/DL (ref 6–8.4)
PROTHROMBIN TIME: 11.1 SEC (ref 9–12.5)
RBC # BLD AUTO: 2.85 M/UL (ref 4.6–6.2)
RBC # BLD AUTO: 2.89 M/UL (ref 4.6–6.2)
SATURATED IRON: 66 % (ref 20–50)
SODIUM SERPL-SCNC: 137 MMOL/L (ref 136–145)
SODIUM SERPL-SCNC: 138 MMOL/L (ref 136–145)
SODIUM SERPL-SCNC: 141 MMOL/L (ref 136–145)
SODIUM SERPL-SCNC: 144 MMOL/L (ref 136–145)
TOTAL IRON BINDING CAPACITY: 158 UG/DL (ref 250–450)
TRANSFERRIN SERPL-MCNC: 107 MG/DL (ref 200–375)
TROPONIN I SERPL DL<=0.01 NG/ML-MCNC: 0.1 NG/ML (ref 0–0.03)
TROPONIN I SERPL DL<=0.01 NG/ML-MCNC: 0.11 NG/ML (ref 0–0.03)
TROPONIN I SERPL DL<=0.01 NG/ML-MCNC: 0.14 NG/ML (ref 0–0.03)
TROPONIN I SERPL DL<=0.01 NG/ML-MCNC: 0.32 NG/ML (ref 0–0.03)
TROPONIN I SERPL DL<=0.01 NG/ML-MCNC: 0.47 NG/ML (ref 0–0.03)
WBC # BLD AUTO: 10.17 K/UL (ref 3.9–12.7)
WBC # BLD AUTO: 9.59 K/UL (ref 3.9–12.7)

## 2023-11-27 PROCEDURE — 36415 COLL VENOUS BLD VENIPUNCTURE: CPT | Performed by: STUDENT IN AN ORGANIZED HEALTH CARE EDUCATION/TRAINING PROGRAM

## 2023-11-27 PROCEDURE — 93005 ELECTROCARDIOGRAM TRACING: CPT

## 2023-11-27 PROCEDURE — 86706 HEP B SURFACE ANTIBODY: CPT | Mod: 91 | Performed by: HOSPITALIST

## 2023-11-27 PROCEDURE — 83540 ASSAY OF IRON: CPT | Performed by: STUDENT IN AN ORGANIZED HEALTH CARE EDUCATION/TRAINING PROGRAM

## 2023-11-27 PROCEDURE — 84466 ASSAY OF TRANSFERRIN: CPT | Performed by: STUDENT IN AN ORGANIZED HEALTH CARE EDUCATION/TRAINING PROGRAM

## 2023-11-27 PROCEDURE — 25000003 PHARM REV CODE 250: Performed by: STUDENT IN AN ORGANIZED HEALTH CARE EDUCATION/TRAINING PROGRAM

## 2023-11-27 PROCEDURE — 85730 THROMBOPLASTIN TIME PARTIAL: CPT | Mod: 91 | Performed by: HOSPITALIST

## 2023-11-27 PROCEDURE — 80048 BASIC METABOLIC PNL TOTAL CA: CPT | Mod: XB | Performed by: STUDENT IN AN ORGANIZED HEALTH CARE EDUCATION/TRAINING PROGRAM

## 2023-11-27 PROCEDURE — 63600175 PHARM REV CODE 636 W HCPCS: Performed by: EMERGENCY MEDICINE

## 2023-11-27 PROCEDURE — 85730 THROMBOPLASTIN TIME PARTIAL: CPT | Performed by: STUDENT IN AN ORGANIZED HEALTH CARE EDUCATION/TRAINING PROGRAM

## 2023-11-27 PROCEDURE — 80053 COMPREHEN METABOLIC PANEL: CPT | Performed by: EMERGENCY MEDICINE

## 2023-11-27 PROCEDURE — 96372 THER/PROPH/DIAG INJ SC/IM: CPT | Performed by: STUDENT IN AN ORGANIZED HEALTH CARE EDUCATION/TRAINING PROGRAM

## 2023-11-27 PROCEDURE — 82728 ASSAY OF FERRITIN: CPT | Performed by: STUDENT IN AN ORGANIZED HEALTH CARE EDUCATION/TRAINING PROGRAM

## 2023-11-27 PROCEDURE — G0378 HOSPITAL OBSERVATION PER HR: HCPCS

## 2023-11-27 PROCEDURE — 36415 COLL VENOUS BLD VENIPUNCTURE: CPT | Performed by: NURSE PRACTITIONER

## 2023-11-27 PROCEDURE — 99213 OFFICE O/P EST LOW 20 MIN: CPT | Mod: ,,, | Performed by: NURSE PRACTITIONER

## 2023-11-27 PROCEDURE — 84484 ASSAY OF TROPONIN QUANT: CPT | Mod: 91 | Performed by: NURSE PRACTITIONER

## 2023-11-27 PROCEDURE — 83880 ASSAY OF NATRIURETIC PEPTIDE: CPT | Performed by: EMERGENCY MEDICINE

## 2023-11-27 PROCEDURE — 82962 GLUCOSE BLOOD TEST: CPT

## 2023-11-27 PROCEDURE — 93010 ELECTROCARDIOGRAM REPORT: CPT | Mod: ,,, | Performed by: INTERNAL MEDICINE

## 2023-11-27 PROCEDURE — 96365 THER/PROPH/DIAG IV INF INIT: CPT | Mod: 59

## 2023-11-27 PROCEDURE — 85025 COMPLETE CBC W/AUTO DIFF WBC: CPT | Performed by: EMERGENCY MEDICINE

## 2023-11-27 PROCEDURE — 84484 ASSAY OF TROPONIN QUANT: CPT | Mod: 91

## 2023-11-27 PROCEDURE — 85025 COMPLETE CBC W/AUTO DIFF WBC: CPT | Mod: 91 | Performed by: STUDENT IN AN ORGANIZED HEALTH CARE EDUCATION/TRAINING PROGRAM

## 2023-11-27 PROCEDURE — G0257 UNSCHED DIALYSIS ESRD PT HOS: HCPCS

## 2023-11-27 PROCEDURE — 99213 PR OFFICE/OUTPT VISIT, EST, LEVL III, 20-29 MIN: ICD-10-PCS | Mod: ,,, | Performed by: INTERNAL MEDICINE

## 2023-11-27 PROCEDURE — 63600175 PHARM REV CODE 636 W HCPCS: Performed by: STUDENT IN AN ORGANIZED HEALTH CARE EDUCATION/TRAINING PROGRAM

## 2023-11-27 PROCEDURE — 83036 HEMOGLOBIN GLYCOSYLATED A1C: CPT | Performed by: STUDENT IN AN ORGANIZED HEALTH CARE EDUCATION/TRAINING PROGRAM

## 2023-11-27 PROCEDURE — 96375 TX/PRO/DX INJ NEW DRUG ADDON: CPT

## 2023-11-27 PROCEDURE — 93010 EKG 12-LEAD: ICD-10-PCS | Mod: ,,, | Performed by: INTERNAL MEDICINE

## 2023-11-27 PROCEDURE — 84484 ASSAY OF TROPONIN QUANT: CPT | Mod: 91 | Performed by: EMERGENCY MEDICINE

## 2023-11-27 PROCEDURE — 99213 OFFICE O/P EST LOW 20 MIN: CPT | Mod: ,,, | Performed by: INTERNAL MEDICINE

## 2023-11-27 PROCEDURE — 99291 CRITICAL CARE FIRST HOUR: CPT

## 2023-11-27 PROCEDURE — 25000003 PHARM REV CODE 250: Performed by: EMERGENCY MEDICINE

## 2023-11-27 PROCEDURE — 86704 HEP B CORE ANTIBODY TOTAL: CPT | Performed by: HOSPITALIST

## 2023-11-27 PROCEDURE — 36415 COLL VENOUS BLD VENIPUNCTURE: CPT | Performed by: HOSPITALIST

## 2023-11-27 PROCEDURE — 96366 THER/PROPH/DIAG IV INF ADDON: CPT

## 2023-11-27 PROCEDURE — 87340 HEPATITIS B SURFACE AG IA: CPT | Performed by: HOSPITALIST

## 2023-11-27 PROCEDURE — 25000242 PHARM REV CODE 250 ALT 637 W/ HCPCS: Performed by: STUDENT IN AN ORGANIZED HEALTH CARE EDUCATION/TRAINING PROGRAM

## 2023-11-27 PROCEDURE — 99213 PR OFFICE/OUTPT VISIT, EST, LEVL III, 20-29 MIN: ICD-10-PCS | Mod: ,,, | Performed by: NURSE PRACTITIONER

## 2023-11-27 PROCEDURE — 85610 PROTHROMBIN TIME: CPT | Performed by: STUDENT IN AN ORGANIZED HEALTH CARE EDUCATION/TRAINING PROGRAM

## 2023-11-27 RX ORDER — HEPARIN SODIUM,PORCINE/D5W 25000/250
0-40 INTRAVENOUS SOLUTION INTRAVENOUS CONTINUOUS
Status: DISCONTINUED | OUTPATIENT
Start: 2023-11-27 | End: 2023-11-29

## 2023-11-27 RX ORDER — ACETAMINOPHEN 325 MG/1
650 TABLET ORAL EVERY 4 HOURS PRN
Status: DISCONTINUED | OUTPATIENT
Start: 2023-11-27 | End: 2023-11-29 | Stop reason: HOSPADM

## 2023-11-27 RX ORDER — CINACALCET 30 MG/1
90 TABLET, FILM COATED ORAL DAILY
Status: DISCONTINUED | OUTPATIENT
Start: 2023-11-27 | End: 2023-11-29 | Stop reason: HOSPADM

## 2023-11-27 RX ORDER — IBUPROFEN 200 MG
24 TABLET ORAL
Status: DISCONTINUED | OUTPATIENT
Start: 2023-11-27 | End: 2023-11-29 | Stop reason: HOSPADM

## 2023-11-27 RX ORDER — GLUCAGON 1 MG
1 KIT INJECTION
Status: DISCONTINUED | OUTPATIENT
Start: 2023-11-27 | End: 2023-11-29 | Stop reason: HOSPADM

## 2023-11-27 RX ORDER — TALC
6 POWDER (GRAM) TOPICAL NIGHTLY PRN
Status: DISCONTINUED | OUTPATIENT
Start: 2023-11-27 | End: 2023-11-29 | Stop reason: HOSPADM

## 2023-11-27 RX ORDER — AMLODIPINE BESYLATE 10 MG/1
10 TABLET ORAL DAILY
Status: DISCONTINUED | OUTPATIENT
Start: 2023-11-27 | End: 2023-11-28

## 2023-11-27 RX ORDER — HYDRALAZINE HYDROCHLORIDE 25 MG/1
25 TABLET, FILM COATED ORAL EVERY 8 HOURS
Status: DISCONTINUED | OUTPATIENT
Start: 2023-11-27 | End: 2023-11-27

## 2023-11-27 RX ORDER — ACETAMINOPHEN 500 MG
1000 TABLET ORAL EVERY 8 HOURS PRN
Status: DISCONTINUED | OUTPATIENT
Start: 2023-11-27 | End: 2023-11-29 | Stop reason: HOSPADM

## 2023-11-27 RX ORDER — SODIUM CHLORIDE 0.9 % (FLUSH) 0.9 %
10 SYRINGE (ML) INJECTION
Status: DISCONTINUED | OUTPATIENT
Start: 2023-11-27 | End: 2023-11-29 | Stop reason: HOSPADM

## 2023-11-27 RX ORDER — NITROGLYCERIN 0.4 MG/1
0.4 TABLET SUBLINGUAL EVERY 5 MIN PRN
Status: DISCONTINUED | OUTPATIENT
Start: 2023-11-27 | End: 2023-11-29 | Stop reason: HOSPADM

## 2023-11-27 RX ORDER — HEPARIN SODIUM 5000 [USP'U]/ML
5000 INJECTION, SOLUTION INTRAVENOUS; SUBCUTANEOUS EVERY 8 HOURS
Status: DISCONTINUED | OUTPATIENT
Start: 2023-11-27 | End: 2023-11-27

## 2023-11-27 RX ORDER — IBUPROFEN 200 MG
16 TABLET ORAL
Status: DISCONTINUED | OUTPATIENT
Start: 2023-11-27 | End: 2023-11-29 | Stop reason: HOSPADM

## 2023-11-27 RX ORDER — SEVELAMER CARBONATE 800 MG/1
800 TABLET, FILM COATED ORAL
Status: DISCONTINUED | OUTPATIENT
Start: 2023-11-27 | End: 2023-11-29 | Stop reason: HOSPADM

## 2023-11-27 RX ORDER — HYDRALAZINE HYDROCHLORIDE 50 MG/1
50 TABLET, FILM COATED ORAL EVERY 8 HOURS
Status: DISCONTINUED | OUTPATIENT
Start: 2023-11-27 | End: 2023-11-29 | Stop reason: HOSPADM

## 2023-11-27 RX ORDER — POLYETHYLENE GLYCOL 3350 17 G/17G
17 POWDER, FOR SOLUTION ORAL 2 TIMES DAILY
Status: DISCONTINUED | OUTPATIENT
Start: 2023-11-27 | End: 2023-11-29 | Stop reason: HOSPADM

## 2023-11-27 RX ORDER — NALOXONE HCL 0.4 MG/ML
0.02 VIAL (ML) INJECTION
Status: DISCONTINUED | OUTPATIENT
Start: 2023-11-27 | End: 2023-11-29 | Stop reason: HOSPADM

## 2023-11-27 RX ORDER — NAPROXEN SODIUM 220 MG/1
81 TABLET, FILM COATED ORAL DAILY
Status: DISCONTINUED | OUTPATIENT
Start: 2023-11-27 | End: 2023-11-29 | Stop reason: HOSPADM

## 2023-11-27 RX ORDER — CARVEDILOL 25 MG/1
25 TABLET ORAL 2 TIMES DAILY WITH MEALS
Status: DISCONTINUED | OUTPATIENT
Start: 2023-11-27 | End: 2023-11-29 | Stop reason: HOSPADM

## 2023-11-27 RX ADMIN — CARVEDILOL 25 MG: 25 TABLET, FILM COATED ORAL at 06:11

## 2023-11-27 RX ADMIN — SEVELAMER CARBONATE 800 MG: 800 TABLET, FILM COATED ORAL at 12:11

## 2023-11-27 RX ADMIN — DEXTROSE 1 G: 50 INJECTION, SOLUTION INTRAVENOUS at 05:11

## 2023-11-27 RX ADMIN — SEVELAMER CARBONATE 800 MG: 800 TABLET, FILM COATED ORAL at 06:11

## 2023-11-27 RX ADMIN — HYDRALAZINE HYDROCHLORIDE 50 MG: 50 TABLET ORAL at 03:11

## 2023-11-27 RX ADMIN — CINACALCET 90 MG: 30 TABLET, FILM COATED ORAL at 09:11

## 2023-11-27 RX ADMIN — HYDRALAZINE HYDROCHLORIDE 25 MG: 25 TABLET, FILM COATED ORAL at 05:11

## 2023-11-27 RX ADMIN — SODIUM ZIRCONIUM CYCLOSILICATE 10 G: 10 POWDER, FOR SUSPENSION ORAL at 04:11

## 2023-11-27 RX ADMIN — HEPARIN SODIUM 12 UNITS/KG/HR: 10000 INJECTION, SOLUTION INTRAVENOUS at 06:11

## 2023-11-27 RX ADMIN — SEVELAMER CARBONATE 800 MG: 800 TABLET, FILM COATED ORAL at 07:11

## 2023-11-27 RX ADMIN — INSULIN HUMAN 5 UNITS: 100 INJECTION, SOLUTION PARENTERAL at 06:11

## 2023-11-27 RX ADMIN — Medication 6 MG: at 06:11

## 2023-11-27 RX ADMIN — HYDRALAZINE HYDROCHLORIDE 50 MG: 50 TABLET ORAL at 09:11

## 2023-11-27 RX ADMIN — SODIUM ZIRCONIUM CYCLOSILICATE 10 G: 10 POWDER, FOR SUSPENSION ORAL at 09:11

## 2023-11-27 RX ADMIN — HEPARIN SODIUM 12 UNITS/KG/HR: 10000 INJECTION, SOLUTION INTRAVENOUS at 09:11

## 2023-11-27 RX ADMIN — ASPIRIN 81 MG CHEWABLE TABLET 81 MG: 81 TABLET CHEWABLE at 09:11

## 2023-11-27 RX ADMIN — DEXTROSE MONOHYDRATE 250 ML: 100 INJECTION, SOLUTION INTRAVENOUS at 05:11

## 2023-11-27 RX ADMIN — HEPARIN SODIUM 5000 UNITS: 5000 INJECTION INTRAVENOUS; SUBCUTANEOUS at 05:11

## 2023-11-27 RX ADMIN — AMLODIPINE BESYLATE 10 MG: 10 TABLET ORAL at 08:11

## 2023-11-27 RX ADMIN — CARVEDILOL 25 MG: 25 TABLET, FILM COATED ORAL at 07:11

## 2023-11-27 NOTE — ASSESSMENT & PLAN NOTE
"Patient's anemia is currently controlled. Has not received any PRBCs to date. Etiology likely d/t chronic disease due to Chronic Kidney Disease/ESRD  Current CBC reviewed-   Lab Results   Component Value Date    HGB 8.6 (L) 11/27/2023    HCT 27.5 (L) 11/27/2023     Monitor serial CBC and transfuse if patient becomes hemodynamically unstable, symptomatic or H/H drops below 7/21.      Plan:  - CBC with differential, iron studies, CMP  POC Hematocrit   Date Value Ref Range Status   11/30/2022 38 36 - 54 %PCV Final     Hematocrit   Date Value Ref Range Status   11/27/2023 27.5 (L) 40.0 - 54.0 % Final     MCH   Date Value Ref Range Status   11/27/2023 30.2 27.0 - 31.0 pg Final     MCV   Date Value Ref Range Status   11/27/2023 97 82 - 98 fL Final   No results found for: "RETIC"No results found for: "BILIRUTOT"    "

## 2023-11-27 NOTE — NURSING
Nurses Note -- 4 Eyes      11/27/2023   10:38 AM      Skin assessed during: Admit      [x] No Altered Skin Integrity Present    [x]Prevention Measures Documented      [] Yes- Altered Skin Integrity Present or Discovered   [] LDA Added if Not in Epic (Describe Wound)   [] New Altered Skin Integrity was Present on Admit and Documented in LDA   [] Wound Image Taken    Wound Care Consulted? No    Attending Nurse:  Cher Guzman RN/Staff Member:   Paul

## 2023-11-27 NOTE — ED TRIAGE NOTES
Haroldo Dickinson, an 48 y.o. male presents to the ED via EMS with c/o chest pain x 5 hours. Pt received 324 of ASA, 3 sprays of nitro, and 1 inch of nitro paste with EMS. Pt currently states CP has completely resolved at this time.      Chief Complaint   Patient presents with    Chest Pain     X 5 hrs. Given 324 ASA,3 sprays nitro, 1in nitro paste     Review of patient's allergies indicates:   Allergen Reactions    No known allergies      Past Medical History:   Diagnosis Date    CAD (coronary artery disease), native coronary artery 11/21/2019    Cataract     Diabetes mellitus     Diabetic retinopathy     Dialysis patient     DM type 2 causing renal disease, not at goal     ESRD (end stage renal disease) started dialysis 01/2014 06/05/2014    Hyperparathyroidism, secondary renal 06/05/2014    Hypertension     NSTEMI (non-ST elevated myocardial infarction) 12/21/2013    Organ transplant candidate 06/05/2014    Pneumonia     Renal cell carcinoma of right kidney     Renal hypertension     Stroke

## 2023-11-27 NOTE — CONSULTS
[facility]  Cardiology  Consult Note    Patient Name: Haroldo Dickinson  MRN: [id number]  Admission Date: 11/27/2023  Hospital Length of Stay: 0 days  Code Status: Full Code  Attending Provider: Apple Arredondo MD  Consulting Provider: Roc Rosas MD  Primary Care Physician: Tess Ledbetter MD  Principal Problem: Chest pain    Patient information was obtained from patient, past medical records, ER records, and primary team.    Inpatient consult to Cardiology  Consult performed by: Roc Rosas MD  Consult ordered by: Ilir Li MD        Subjective:     Chief Complaint   Patient presents with    Chest Pain     X 5 hrs. Given 324 ASA,3 sprays nitro, 1in nitro paste      HPI:   48 y.o. male w/ h/o CAD s/p PCI x2 (not on DAPT for u/k reasons), CVA w/ residual RSW, RCC s/p BL nephrectomy on HD MWF; p/w intermittent CP x5 days, burning quality, 7/10 at worst, worse at night, not associated w/ exertion.  Most recent CP episode was associated w/ SOB & lasted 5 hrs, which prompted his presentation & admission. He does not make urine. Received SL NG in ED w/ relief of CP. He was hypertensive w/o tachycardia & had good SpO2 ORA. Trop 0.096 -> 0.108 -> 0.141; ; K 5.7. ECG w/ NSR w/ peaked T waves; K shifted in ED. CXR w/o congestion; otherwise u/r. Started on ACS protocol.    Cardiology consulted for ACS.    Past Medical History:   Diagnosis Date    CAD (coronary artery disease), native coronary artery 11/21/2019    Cataract     Diabetes mellitus     Diabetic retinopathy     Dialysis patient     DM type 2 causing renal disease, not at goal     ESRD (end stage renal disease) started dialysis 01/2014 06/05/2014    Hyperparathyroidism, secondary renal 06/05/2014    Hypertension     NSTEMI (non-ST elevated myocardial infarction) 12/21/2013    Organ transplant candidate 06/05/2014    Pneumonia     Renal cell carcinoma of right kidney     Renal hypertension     Stroke        Past Surgical History:   Procedure  Laterality Date    ANGIOGRAM, CORONARY, WITH LEFT HEART CATHETERIZATION N/A 7/1/2021    Procedure: Angiogram, Coronary, with Left Heart Cath;  Surgeon: Miki Zendejas MD;  Location: [facility] CATH LAB;  Service: Cardiology;  Laterality: N/A;    COLONOSCOPY N/A 5/3/2017    Procedure: COLONOSCOPY;  Surgeon: Rufino Carpenter MD;  Location: [facility] ENDO (4TH FLR);  Service: Endoscopy;  Laterality: N/A;  pt states can only schedule on Wednesdays    COLONOSCOPY N/A 2/9/2021    Procedure: COLONOSCOPY;  Surgeon: Edil Wong MD;  Location: [facility] ENDO (4TH FLR);  Service: Endoscopy;  Laterality: N/A;  Dialysis MWF/ labwork day of procedure  right arm aceess  per Dr. Colin pt can hold Plavix 5 days prior see note- sm  COVID test on 2/6/21 at [facility] - sm    COLONOSCOPY N/A 2/10/2021    Procedure: COLONOSCOPY;  Surgeon: Robert Ayers MD;  Location: [facility] (4TH FLR);  Service: Endoscopy;  Laterality: N/A;  rescheduled due to poor bowel prep-BB  negative covid screening 2/6/21-BB  dialysis M-W-F-BB  okay to r/s for 2/9/21 and to hold Plavix per Dr. KIRAN Wong-BB  labs same day-BB    CORONARY STENT PLACEMENT N/A 7/25/2019    Procedure: INSERTION, STENT, CORONARY ARTERY;  Surgeon: Miki Zendejas MD;  Location: [facility] CATH LAB;  Service: Cardiology;  Laterality: N/A;    DIALYSIS FISTULA CREATION      FISTULOGRAM N/A 2/18/2019    Procedure: Fistulogram;  Surgeon: Yaneth De La Cruz MD;  Location: [facility] CATH LAB;  Service: Cardiology;  Laterality: N/A;    FISTULOGRAM Right 7/23/2019    Procedure: Fistulogram;  Surgeon: Oz Cordoba MD;  Location: [facility] CATH LAB;  Service: Cardiology;  Laterality: Right;    LAPAROSCOPIC ROBOT-ASSISTED SURGICAL REMOVAL OF KIDNEY USING DA JAIME XI Right 5/19/2022    Procedure: XI ROBOTIC NEPHRECTOMY;  Surgeon: Aidan Hendricks MD;  [address];  Service: Urology;  Laterality: Right;  3hrs    LAPAROSCOPIC ROBOT-ASSISTED SURGICAL REMOVAL OF KIDNEY USING DA JAIME XI Left 1/5/2023     Procedure: XI ROBOTIC NEPHRECTOMY;  Surgeon: Aidan Hendricks MD;  Location: [facility] OR [address] [place] [address];  Service: Urology;  Laterality: Left;  4 hrs    LEFT HEART CATHETERIZATION Left 7/25/2019    Procedure: Left heart cath;  Surgeon: Miki Zendejas MD;  Location: [facility] CATH LAB;  Service: Cardiology;  Laterality: Left;    REPAIR  1/5/2023    Procedure: REPAIR; COLOTOMY;  Surgeon: Maikel Lamb MD;  Location: [address];  Service: General;;    RETINAL LASER PROCEDURE Bilateral 2018 or 2017    Dr. Rothman    VASCULAR SURGERY         Review of patient's allergies indicates:   Allergen Reactions    No known allergies        Current Facility-Administered Medications on File Prior to Encounter   Medication    lidocaine (PF) 10 mg/ml (1%) injection 10 mg     Current Outpatient Medications on File Prior to Encounter   Medication Sig    amLODIPine (NORVASC) 10 MG tablet Take 1 tablet (10 mg total) by mouth once daily.    aspirin (ECOTRIN) 81 MG EC tablet Take 1 tablet (81 mg total) by mouth once daily.    AURYXIA 210 mg iron Tab Take 420 mg by mouth 3 (three) times daily with meals. Take 1 with snacks    carvediloL (COREG) 25 MG tablet TAKE 1 TABLET BY MOUTH TWICE A DAY WITH FOOD    ciclopirox (PENLAC) 8 % Soln Apply topically nightly.    cinacalcet (SENSIPAR) 90 MG Tab Take 1 tablet by mouth once daily.    hydrALAZINE (APRESOLINE) 25 MG tablet Take 1 tablet (25 mg total) by mouth every 8 (eight) hours. for SBP > 140mm HG    polyethylene glycol (GLYCOLAX) 17 gram/dose powder Use cap to measure 17 g, then mix in liquid and drink by mouth once daily.    sevelamer carbonate (RENVELA) 800 mg Tab Take 800 mg by mouth 3 (three) times daily with meals.     Family History       Problem Relation (Age of Onset)    Cancer Maternal Grandfather    Cataracts Mother    Diabetes Mother    Heart disease Brother    Hypertension Mother, Father, Sister, Brother    Kidney disease Brother          Tobacco Use    Smoking status: Never     Smokeless tobacco: Never   Substance and Sexual Activity    Alcohol use: Not Currently     Comment: One drink a month    Drug use: No    Sexual activity: Yes     Partners: Female     Birth control/protection: None     Review of Systems   Constitutional: Negative for chills, fever and night sweats.   Eyes:  Negative for visual disturbance.   Cardiovascular:  Positive for chest pain. Negative for dyspnea on exertion, leg swelling and orthopnea.   Respiratory:  Positive for shortness of breath. Negative for cough and hemoptysis.    Skin:  Negative for color change and rash.   Musculoskeletal:  Negative for joint pain and myalgias.   Gastrointestinal:  Negative for abdominal pain, diarrhea, hematemesis, hematochezia and melena.   Genitourinary:  Negative for dysuria and hematuria.   Neurological:  Negative for headaches and light-headedness.   Psychiatric/Behavioral:  Negative for altered mental status and memory loss.    All other systems reviewed and are negative.    Objective:     Vital Signs (Most Recent):  Temp: 98 °F (36.7 °C) (11/27/23 0238)  Pulse: 72 (11/27/23 0845)  Resp: 18 (11/27/23 0703)  BP: (!) 178/82 (11/27/23 0845)  SpO2: 95 % (11/27/23 0845) Vital Signs (24h Range):  Temp:  [98 °F (36.7 °C)] 98 °F (36.7 °C)  Pulse:  [72-92] 72  Resp:  [16-20] 18  SpO2:  [95 %-98 %] 95 %  BP: (172-203)/(81-96) 178/82     Weight: 69.9 kg (154 lb 1.6 oz)  Body mass index is 24.14 kg/m².    SpO2: 95 %         Intake/Output Summary (Last 24 hours) at 11/27/2023 1112  Last data filed at 11/27/2023 0621  Gross per 24 hour   Intake 300 ml   Output --   Net 300 ml       Lines/Drains/Airways       Peripheral Intravenous Line  Duration                  Hemodialysis AV Fistula Right forearm -- days         Peripheral IV - Single Lumen 11/27/23 0253 20 G Left Antecubital <1 day         Peripheral IV - Single Lumen 11/27/23 0629 20 G Anterior;Left Forearm <1 day                     Physical Exam  Vitals and nursing note reviewed.    Constitutional:       General: He is not in acute distress.     Appearance: He is not ill-appearing.   HENT:      Head: Normocephalic and atraumatic.      Right Ear: External ear normal.      Left Ear: External ear normal.      Mouth/Throat:      Mouth: Mucous membranes are moist.      Pharynx: No oropharyngeal exudate.   Eyes:      Extraocular Movements: Extraocular movements intact.      Pupils: Pupils are equal, round, and reactive to light.   Cardiovascular:      Rate and Rhythm: Normal rate and regular rhythm.      Pulses: Normal pulses.      Heart sounds: Normal heart sounds. No murmur heard.  Pulmonary:      Effort: Pulmonary effort is normal. No respiratory distress.      Breath sounds: Normal breath sounds. No wheezing or rales.   Abdominal:      General: Abdomen is flat. Bowel sounds are normal. There is no distension.      Palpations: Abdomen is soft.      Tenderness: There is no abdominal tenderness.   Musculoskeletal:         General: No signs of injury. Normal range of motion.      Cervical back: Normal range of motion.      Right lower leg: No edema.      Left lower leg: No edema.      Comments: R forearm AVF   Skin:     General: Skin is warm and dry.   Neurological:      General: No focal deficit present.      Mental Status: He is alert and oriented to person, place, and time.      Sensory: No sensory deficit.      Motor: No weakness.   Psychiatric:         Mood and Affect: Mood normal.         Behavior: Behavior normal.         Thought Content: Thought content normal.          Significant Labs: All pertinent lab results from the last 24 hours have been reviewed.    Significant Imaging:  reviewed  Assessment/Plan:      * Chest pain  48 y.o. male w/ h/o CAD s/p PCI x2 (not on DAPT for u/k reasons), CVA w/ residual RSW, RCC s/p BL nephrectomy on HD MWF; p/w intermittent CP x5 days, burning quality, 7/10 at worst, worse at night, not associated w/ exertion.  Most recent CP episode was associated w/  SOB & lasted 5 hrs, which prompted his presentation & admission. Received SL NG in ED w/ relief of CP. He was hypertensive w/o tachycardia & had good SpO2 ORA. Trop 0.096 -> 0.108 -> 0.141; ; K 5.7. ECG w/ NSR w/ peaked T waves; K shifted in ED. CXR w/o congestion; otherwise u/r. Last TTE on 05/02 w/ EF 60%, G3 LV DD, mild MR. Started on ACS protocol.    Recommendations:  -will reach out to PET  for possible PET on 11/28  -if unable to schedule PET for 11/28, will contact IC for possible LHC  -trend trop to peak  -ACS protocol (ASA, clopidogrel, BB, statin, heparin gtt)  -SL NG 0.4 mg q5 mins prn CP  -antihypertensives prn goal SBP < 180  -replete lytes prn goal K > 4.0, Mg > 2.0  -remainder of care per primary team  -please call on-call fellow ([phone]) w/ any further questions or change in clinical status        VTE Risk Mitigation (From admission, onward)           Ordered     heparin 25,000 units in dextrose 5% (100 units/ml) IV bolus from bag - ADDITIONAL PRN BOLUS - 60 units/kg (max bolus 4000 units)  As needed (PRN)        Question:  Heparin Infusion Adjustment (DO NOT MODIFY ANSWER)  Answer:  \\[email or web address]\epic\Images\Pharmacy\HeparinInfusions\heparin LOW INTENSITY nomogram for [facility] [id number].pdf    11/27/23 0524     heparin 25,000 units in dextrose 5% (100 units/ml) IV bolus from bag - ADDITIONAL PRN BOLUS - 30 units/kg (max bolus 4000 units)  As needed (PRN)        Question:  Heparin Infusion Adjustment (DO NOT MODIFY ANSWER)  Answer:  \\[email or web address]\epic\Images\Pharmacy\HeparinInfusions\heparin LOW INTENSITY nomogram for [facility] [id number].pdf    11/27/23 0524     heparin 25,000 units in dextrose 5% 250 mL (100 units/mL) infusion LOW INTENSITY nomogram - [facility]  Continuous        Question:  Begin at (units/kg/hr)  Answer:  12    11/27/23 0524     IP VTE HIGH RISK PATIENT  Once         11/27/23 0450     Place sequential compression device  Until discontinued         11/27/23 0450                    Thank you  for your consult. I will follow-up with patient. Please contact us if you have any additional questions.     Roc Rosas MD  Cardiology  [facility]

## 2023-11-27 NOTE — ASSESSMENT & PLAN NOTE
48 y.o. male w/ h/o CAD s/p PCI x2 (not on DAPT for u/k reasons), CVA w/ residual RSW, RCC s/p BL nephrectomy on HD MWF; p/w intermittent CP x5 days, burning quality, 7/10 at worst, worse at night, not associated w/ exertion.  Most recent CP episode was associated w/ SOB & lasted 5 hrs, which prompted his presentation & admission. Received SL NG in ED w/ relief of CP. He was hypertensive w/o tachycardia & had good SpO2 ORA. Trop 0.096 -> 0.108 -> 0.141; ; K 5.7. ECG w/ NSR w/ peaked T waves; K shifted in ED. CXR w/o congestion; otherwise u/r. Last TTE on 05/02 w/ EF 60%, G3 LV DD, mild MR. Started on ACS protocol.    Recommendations:  -will reach out to PET  for possible PET on 11/28  -if unable to schedule PET for 11/28, will contact IC for possible LHC  -trend trop to peak  -ACS protocol (ASA, clopidogrel, BB, statin, heparin gtt)  -SL NG 0.4 mg q5 mins prn CP  -antihypertensives prn goal SBP < 180  -replete lytes prn goal K > 4.0, Mg > 2.0  -remainder of care per primary team  -please call on-call fellow ([phone]) w/ any further questions or change in clinical status

## 2023-11-27 NOTE — SUBJECTIVE & OBJECTIVE
Past Medical History:   Diagnosis Date    CAD (coronary artery disease), native coronary artery 11/21/2019    Cataract     Diabetes mellitus     Diabetic retinopathy     Dialysis patient     DM type 2 causing renal disease, not at goal     ESRD (end stage renal disease) started dialysis 01/2014 06/05/2014    Hyperparathyroidism, secondary renal 06/05/2014    Hypertension     NSTEMI (non-ST elevated myocardial infarction) 12/21/2013    Organ transplant candidate 06/05/2014    Pneumonia     Renal cell carcinoma of right kidney     Renal hypertension     Stroke        Past Surgical History:   Procedure Laterality Date    ANGIOGRAM, CORONARY, WITH LEFT HEART CATHETERIZATION N/A 7/1/2021    Procedure: Angiogram, Coronary, with Left Heart Cath;  Surgeon: Miki Zendejas MD;  Location: [facility] CATH LAB;  Service: Cardiology;  Laterality: N/A;    COLONOSCOPY N/A 5/3/2017    Procedure: COLONOSCOPY;  Surgeon: Rufino Carpenter MD;  Location: [facility] ([address] [place] [address]);  Service: Endoscopy;  Laterality: N/A;  pt states can only schedule on Wednesdays    COLONOSCOPY N/A 2/9/2021    Procedure: COLONOSCOPY;  Surgeon: Edil Wong MD;  [facility]  Service: Endoscopy;  Laterality: N/A;  Dialysis MWF/ labwork day of procedure  right arm aceess  per Dr. Colin pt can hold Plavix 5 days prior see note- sm  COVID test on 2/6/21 at [facility] - sm    COLONOSCOPY N/A 2/10/2021    Procedure: COLONOSCOPY;  Surgeon: Robert Ayers MD;  [facility] ([facility]  Service: Endoscopy;  Laterality: N/A;  rescheduled due to poor bowel prep-BB  negative covid screening 2/6/21-BB  dialysis M-W-F-BB  okay to r/s for 2/9/21 and to hold Plavix per Dr. KIRAN Wong-BB  labs same day-BB    CORONARY STENT PLACEMENT N/A 7/25/2019    Procedure: INSERTION, STENT, CORONARY ARTERY;  Surgeon: Miki Zendejas MD;  Location: [facility] CATH LAB;  Service: Cardiology;  Laterality: N/A;    DIALYSIS FISTULA CREATION      FISTULOGRAM N/A 2/18/2019    Procedure:  Fistulogram;  Surgeon: Yaneth De La Cruz MD;  Location: [facility] CATH LAB;  Service: Cardiology;  Laterality: N/A;    FISTULOGRAM Right 7/23/2019    Procedure: Fistulogram;  Surgeon: Oz Cordoba MD;  Location: [facility] CATH LAB;  Service: Cardiology;  Laterality: Right;    LAPAROSCOPIC ROBOT-ASSISTED SURGICAL REMOVAL OF KIDNEY USING DA JAIME XI Right 5/19/2022    Procedure: XI ROBOTIC NEPHRECTOMY;  Surgeon: Aidan Hendricks MD;  Location: [facility] OR [facility] FLR;  Service: Urology;  Laterality: Right;  3hrs    LAPAROSCOPIC ROBOT-ASSISTED SURGICAL REMOVAL OF KIDNEY USING DA JAIME XI Left 1/5/2023    Procedure: XI ROBOTIC NEPHRECTOMY;  Surgeon: Aidan Hendricks MD;  Location: [facility] OR [facility] FLR;  Service: Urology;  Laterality: Left;  4 hrs    LEFT HEART CATHETERIZATION Left 7/25/2019    Procedure: Left heart cath;  Surgeon: Miki Zendejas MD;  Location: [facility] CATH LAB;  Service: Cardiology;  Laterality: Left;    REPAIR  1/5/2023    Procedure: REPAIR; COLOTOMY;  Surgeon: Maikel Lamb MD;  [address];  Service: General;;    RETINAL LASER PROCEDURE Bilateral 2018 or 2017    Dr. Rothman    VASCULAR SURGERY         Review of patient's allergies indicates:   Allergen Reactions    No known allergies        Current Facility-Administered Medications on File Prior to Encounter   Medication    lidocaine (PF) 10 mg/ml (1%) injection 10 mg     Current Outpatient Medications on File Prior to Encounter   Medication Sig    amLODIPine (NORVASC) 10 MG tablet Take 1 tablet (10 mg total) by mouth once daily.    aspirin (ECOTRIN) 81 MG EC tablet Take 1 tablet (81 mg total) by mouth once daily.    AURYXIA 210 mg iron Tab Take 420 mg by mouth 3 (three) times daily with meals. Take 1 with snacks    carvediloL (COREG) 25 MG tablet TAKE 1 TABLET BY MOUTH TWICE A DAY WITH FOOD    ciclopirox (PENLAC) 8 % Soln Apply topically nightly.    cinacalcet (SENSIPAR) 90 MG Tab Take 1 tablet by mouth once daily.    hydrALAZINE (APRESOLINE)  25 MG tablet Take 1 tablet (25 mg total) by mouth every 8 (eight) hours. for SBP > 140mm HG    polyethylene glycol (GLYCOLAX) 17 gram/dose powder Use cap to measure 17 g, then mix in liquid and drink by mouth once daily.    sevelamer carbonate (RENVELA) 800 mg Tab Take 800 mg by mouth 3 (three) times daily with meals.     Family History       Problem Relation (Age of Onset)    Cancer Maternal Grandfather    Cataracts Mother    Diabetes Mother    Heart disease Brother    Hypertension Mother, Father, Sister, Brother    Kidney disease Brother          Tobacco Use    Smoking status: Never    Smokeless tobacco: Never   Substance and Sexual Activity    Alcohol use: Not Currently     Comment: One drink a month    Drug use: No    Sexual activity: Yes     Partners: Female     Birth control/protection: None     Review of Systems   Constitutional: Negative for chills, fever and night sweats.   Eyes:  Negative for visual disturbance.   Cardiovascular:  Positive for chest pain. Negative for dyspnea on exertion, leg swelling and orthopnea.   Respiratory:  Positive for shortness of breath. Negative for cough and hemoptysis.    Skin:  Negative for color change and rash.   Musculoskeletal:  Negative for joint pain and myalgias.   Gastrointestinal:  Negative for abdominal pain, diarrhea, hematemesis, hematochezia and melena.   Genitourinary:  Negative for dysuria and hematuria.   Neurological:  Negative for headaches and light-headedness.   Psychiatric/Behavioral:  Negative for altered mental status and memory loss.    All other systems reviewed and are negative.    Objective:     Vital Signs (Most Recent):  Temp: 98 °F (36.7 °C) (11/27/23 0238)  Pulse: 72 (11/27/23 0845)  Resp: 18 (11/27/23 0703)  BP: (!) 178/82 (11/27/23 0845)  SpO2: 95 % (11/27/23 0845) Vital Signs (24h Range):  Temp:  [98 °F (36.7 °C)] 98 °F (36.7 °C)  Pulse:  [72-92] 72  Resp:  [16-20] 18  SpO2:  [95 %-98 %] 95 %  BP: (172-203)/(81-96) 178/82     Weight: 69.9 kg  (154 lb 1.6 oz)  Body mass index is 24.14 kg/m².    SpO2: 95 %         Intake/Output Summary (Last 24 hours) at 11/27/2023 1112  Last data filed at 11/27/2023 0621  Gross per 24 hour   Intake 300 ml   Output --   Net 300 ml       Lines/Drains/Airways       Peripheral Intravenous Line  Duration                  Hemodialysis AV Fistula Right forearm -- days         Peripheral IV - Single Lumen 11/27/23 0253 20 G Left Antecubital <1 day         Peripheral IV - Single Lumen 11/27/23 0629 20 G Anterior;Left Forearm <1 day                     Physical Exam  Vitals and nursing note reviewed.   Constitutional:       General: He is not in acute distress.     Appearance: He is not ill-appearing.   HENT:      Head: Normocephalic and atraumatic.      Right Ear: External ear normal.      Left Ear: External ear normal.      Mouth/Throat:      Mouth: Mucous membranes are moist.      Pharynx: No oropharyngeal exudate.   Eyes:      Extraocular Movements: Extraocular movements intact.      Pupils: Pupils are equal, round, and reactive to light.   Cardiovascular:      Rate and Rhythm: Normal rate and regular rhythm.      Pulses: Normal pulses.      Heart sounds: Normal heart sounds. No murmur heard.  Pulmonary:      Effort: Pulmonary effort is normal. No respiratory distress.      Breath sounds: Normal breath sounds. No wheezing or rales.   Abdominal:      General: Abdomen is flat. Bowel sounds are normal. There is no distension.      Palpations: Abdomen is soft.      Tenderness: There is no abdominal tenderness.   Musculoskeletal:         General: No signs of injury. Normal range of motion.      Cervical back: Normal range of motion.      Right lower leg: No edema.      Left lower leg: No edema.      Comments: R forearm AVF   Skin:     General: Skin is warm and dry.   Neurological:      General: No focal deficit present.      Mental Status: He is alert and oriented to person, place, and time.      Sensory: No sensory deficit.       Motor: No weakness.   Psychiatric:         Mood and Affect: Mood normal.         Behavior: Behavior normal.         Thought Content: Thought content normal.          Significant Labs: All pertinent lab results from the last 24 hours have been reviewed.    Significant Imaging:  reviewed

## 2023-11-27 NOTE — ASSESSMENT & PLAN NOTE
"Patient is a 48-year-old male with past medical history significant for bilateral RCC status post nephrectomy, ESRD on HD Monday Wednesday Friday presenting with a 5 day history of waxing/waning chest.     The patient states that they (+) have chest pain. It has sudden onset. It (--) associated with exertion. It (--) relieved by rest. It (+) relieved by nitroglycerin.  There is no radiation.  It is associated with shortness of breath and is not affected by position although appears to be more common nocturnally.  Patient states that he sleeps on 2 pillows but overall has no trouble sleeping flat.  He is currently not hypoxic on room air and resting comfortably prior to me entering the room.    Patient's history is consistent with atypical cardiac chest pain however lack of trop rise suggestive of UA rather than NSTEMI.  He has a ALONDRA score of 4.     EKG shows some nonspecific ST depression in 2,3 and AVF but appears to be stable since may. Peaked T waves also noted in precordial leads.    Plan:  - 81 mg ASA in AM  - load with 325 mg ASA   - Previously told to stop clopidogrel but does not know why  - cardiology consult for UA eval  - Heparin gtt for minimum 48 hours  - continue carvedilol 25 mg b.i.d.; target HR approx 60  - GTN PRN for chest pain   - RBC transfusion if Hgb < 8 g/dL  - No results found for: "RETIC"       "

## 2023-11-27 NOTE — ASSESSMENT & PLAN NOTE
Patient states that his blood pressure at home is typically in the 160s systolic.  He never has normal blood pressures in the 140s over 90s.  He takes carvedilol 25 b.i.d., amlodipine 10, and hydralazine 25 t.i.d..  He does not check his blood pressure at home.  Chronic, uncontrolled. Latest blood pressure and vitals reviewed-     Patient likely had a component of renovascular hypertension now likely represents essential hypertension given the lack of kidneys.    Temp:  [98 °F (36.7 °C)]   Pulse:  [81-92]   Resp:  [16-20]   BP: (172-203)/(81-96)   SpO2:  [96 %-98 %] .   Home meds for hypertension were reviewed and noted below.   Hypertension Medications               amLODIPine (NORVASC) 10 MG tablet Take 1 tablet (10 mg total) by mouth once daily.    carvediloL (COREG) 25 MG tablet TAKE 1 TABLET BY MOUTH TWICE A DAY WITH FOOD    hydrALAZINE (APRESOLINE) 25 MG tablet Take 1 tablet (25 mg total) by mouth every 8 (eight) hours. for SBP > 140mm HG            While in the hospital, will manage blood pressure as follows; Continue home antihypertensive regimen    Will utilize p.r.n. blood pressure medication only if patient's blood pressure greater than 140/90 and he develops symptoms such as worsening chest pain or shortness of breath.    Plan:  - amlodipine 10, carvedilol 25, and hydralazine 25 t.i.d.. Consider switching to nifedipine 60 BID for trial of different CCB.  - Target /90 unless otherwise indicated  - Lifestyle modifications (DASH diet, Mediterranean diet, weight loss with target BMI 18-25, at least 150 minute moderate intensity exercise/week)  - Risk factor modification (smoking cessation, HbA1C < 6.5, Minimize alcohol intake with no more than 1-2 glasses of beer or wine per day or 10 or less drinks per week)  - Statin therapy as indicated by The 10-year ASCVD risk score (Juanpablo CHILDS, et al., 2019) is: 20.2%    Values used to calculate the score:      Age: 48 years      Sex: Male      Is Non-  : Yes      Diabetic: Yes      Tobacco smoker: No      Systolic Blood Pressure: 172 mmHg      Is BP treated: Yes      HDL Cholesterol: 51 mg/dL      Total Cholesterol: 144 mg/dL  - Target LDL < 130, HDL > 40  - Magnesium > 2, Potassium > 4

## 2023-11-27 NOTE — HPI
Patient is a 48-year-old male with past medical history significant for CAD with previous PCI x2, CVA with residual right-sided weakness, RCC status post nephrectomy, ESRD Monday Wednesday Friday, hypertensive kidney disease who is presenting with a 5 day of intermittent chest pain.  Patient describes the chest pain as waxing and waning with a burning quality.  He rates it as a 7/10 at its worst.  He states that it is worse at night and seems to happen more often at that time.  It is not associated with exertion or emotional stress.  The patient of note does not take any aspirin or clopidogrel as he states that a physician here told him not to despite having PCI.  Patient states that he last got dialysis on Friday and feels that he is slightly fluid overloaded.  He had an episode of chest pain today that was associated with shortness of breath lasting 5 hours which prompted him to call an ambulance.  He denies any dizziness or diaphoresis associated with these episodes and states that after calling EMS, he felt better immediately.  In route to the emergency department, he received 1 dose of nitro with relief of his chest discomfort.  In the emergency room patient was hypertensive, non tachycardic on room air without discomfort.  Demonstrating no evidence of fluid overload on chest x-ray.  BNP was 179.  Troponins were slightly elevated but decreased compared to baseline at 0.096.  Potassium was markedly elevated at 5.7 with evidence of peaked T-waves on EKG and he received calcium, insulin/glucose and Lokelma.  He does not make any urine due to his bilateral nephrectomy.  He received 3 space of nitro and 325 of aspirin.

## 2023-11-27 NOTE — HOSPITAL COURSE
Haroldo Dickinson is placed in  Observation for management of chest pain and subsequent NSTEMI. Chest pain resolving on admission. EKG and CXR reviewed. Hyperkalemic on admission, shifted, with repeat K within normal limits. Troponin trending. Continuing ACS for now. Cardiology following. Nephro follow for HD needs. Dialysis 11/28 during admission. Unable to schedule PET today, will proceed with [facility] with known RCA  and distal LAD lesions, Interventional Cardiology recommending medical therapy. Heparin gtt continued to complex 48 hours of medical treatment. HD 11/29.     Patient will continue ASA, plavix, and statin at discharge. He will stop amlodipine and start nifedipine, he will increase hydralazine to 50mg q8h,and increase coreg to 50mg bid. Medications sent to bedside delivery. He will follow up with Internal Medicine and Cardiology. He will resume his scheduled HD appointments. Return precautions provided. Patient medically ready for discharge. Plan of care discussed with patient, patient agreeable to plan, and all questions answered.

## 2023-11-27 NOTE — ED PROVIDER NOTES
History:  Haroldo Dickinson is a 48 y.o. male who presents to the ED with Chest Pain (X 5 hrs. Given 324 ASA,3 sprays nitro, 1in nitro paste)    Described as 48-year-old male with a history of CAD status post stents, ESRD on HD, hypertension, CVA with right-sided weakness presenting to the emergency department with chest pain.  He reports he has had intermittent pain, sharp in his anterior chest radiating to his left arm improved with nitro over the past 5 days, though became constant today.  His pain has since resolved upon arrival to the emergency room.  He was skipped dialysis last Sunday, though completed dialysis on Tuesday and Friday after this initial missed dialysis session.  He was concerned that he was fluid overloaded.  He denies any shortness of breath, fevers, chills, cough.    Review of Systems: All systems reviewed and are negative except as per history of present illness.    Medications:   Previous Medications    AMLODIPINE (NORVASC) 10 MG TABLET    Take 1 tablet (10 mg total) by mouth once daily.    ASPIRIN (ECOTRIN) 81 MG EC TABLET    Take 1 tablet (81 mg total) by mouth once daily.    AURYXIA 210 MG IRON TAB    Take 420 mg by mouth 3 (three) times daily with meals. Take 1 with snacks    CARVEDILOL (COREG) 25 MG TABLET    TAKE 1 TABLET BY MOUTH TWICE A DAY WITH FOOD    CICLOPIROX (PENLAC) 8 % SOLN    Apply topically nightly.    CINACALCET (SENSIPAR) 90 MG TAB    Take 1 tablet by mouth once daily.    HYDRALAZINE (APRESOLINE) 25 MG TABLET    Take 1 tablet (25 mg total) by mouth every 8 (eight) hours. for SBP > 140mm HG    POLYETHYLENE GLYCOL (GLYCOLAX) 17 GRAM/DOSE POWDER    Use cap to measure 17 g, then mix in liquid and drink by mouth once daily.    SEVELAMER CARBONATE (RENVELA) 800 MG TAB    Take 800 mg by mouth 3 (three) times daily with meals.       PMH:   Past Medical History:   Diagnosis Date    CAD (coronary artery disease), native coronary artery 11/21/2019    Cataract     Diabetes mellitus      Diabetic retinopathy     Dialysis patient     DM type 2 causing renal disease, not at goal     ESRD (end stage renal disease) started dialysis 01/2014 06/05/2014    Hyperparathyroidism, secondary renal 06/05/2014    Hypertension     NSTEMI (non-ST elevated myocardial infarction) 12/21/2013    Organ transplant candidate 06/05/2014    Pneumonia     Renal cell carcinoma of right kidney     Renal hypertension     Stroke      PSH:   Past Surgical History:   Procedure Laterality Date    ANGIOGRAM, CORONARY, WITH LEFT HEART CATHETERIZATION N/A 7/1/2021    Procedure: Angiogram, Coronary, with Left Heart Cath;  Surgeon: Miki Zendejas MD;  Location: [facility] CATH LAB;  Service: Cardiology;  Laterality: N/A;    COLONOSCOPY N/A 5/3/2017    Procedure: COLONOSCOPY;  Surgeon: Rufino Carpenter MD;  Location: [facility] ENDO (4TH FLR);  Service: Endoscopy;  Laterality: N/A;  pt states can only schedule on Wednesdays    COLONOSCOPY N/A 2/9/2021    Procedure: COLONOSCOPY;  Surgeon: Edil Wong MD;  Location: [facility] (4TH FLR);  Service: Endoscopy;  Laterality: N/A;  Dialysis MWF/ labwork day of procedure  right arm aceess  per Dr. Colin pt can hold Plavix 5 days prior see note- sm  COVID test on 2/6/21 at [facility] - sm    COLONOSCOPY N/A 2/10/2021    Procedure: COLONOSCOPY;  Surgeon: Robert Ayers MD;  Location: [facility] (4TH FLR);  Service: Endoscopy;  Laterality: N/A;  rescheduled due to poor bowel prep-BB  negative covid screening 2/6/21-BB  dialysis M-W-F-BB  okay to r/s for 2/9/21 and to hold Plavix per Dr. KIRAN Wong-BB  labs same day-BB    CORONARY STENT PLACEMENT N/A 7/25/2019    Procedure: INSERTION, STENT, CORONARY ARTERY;  Surgeon: Miki Zendejas MD;  Location: [facility] CATH LAB;  Service: Cardiology;  Laterality: N/A;    DIALYSIS FISTULA CREATION      FISTULOGRAM N/A 2/18/2019    Procedure: Fistulogram;  Surgeon: Yaneth De La Cruz MD;  Location: [facility] CATH LAB;  Service: Cardiology;  Laterality: N/A;    FISTULOGRAM Right 7/23/2019     Procedure: Fistulogram;  Surgeon: Oz Cordoba MD;  Location: [facility] CATH LAB;  Service: Cardiology;  Laterality: Right;    LAPAROSCOPIC ROBOT-ASSISTED SURGICAL REMOVAL OF KIDNEY USING DA JAIME XI Right 5/19/2022    Procedure: XI ROBOTIC NEPHRECTOMY;  Surgeon: Aidan Hendricks MD;  Location: [facility] OR [facility] FLR;  Service: Urology;  Laterality: Right;  3hrs    LAPAROSCOPIC ROBOT-ASSISTED SURGICAL REMOVAL OF KIDNEY USING DA JAIME XI Left 1/5/2023    Procedure: XI ROBOTIC NEPHRECTOMY;  Surgeon: Aidan Hendricks MD;  Location: [facility] OR [facility] FLR;  Service: Urology;  Laterality: Left;  4 hrs    LEFT HEART CATHETERIZATION Left 7/25/2019    Procedure: Left heart cath;  Surgeon: Miki Zendejas MD;  Location: [facility] CATH LAB;  Service: Cardiology;  Laterality: Left;    REPAIR  1/5/2023    Procedure: REPAIR; COLOTOMY;  Surgeon: Maikel Lamb MD;  [facility];  Service: General;;    RETINAL LASER PROCEDURE Bilateral 2018 or 2017    Dr. Rothman    VASCULAR SURGERY       Allergies: He is allergic to no known allergies.  Social History: Marital Status: single. He  reports that he has never smoked. He has never used smokeless tobacco.. He  reports that he does not currently use alcohol..       Exam:  VITAL SIGNS:   Vitals:    11/27/23 0253 11/27/23 0320 11/27/23 0500 11/27/23 0522   BP: (!) 193/95 (!) 182/96 (!) 172/81 (!) 172/81   Pulse: 81 92 78    Resp: 20 16 18    Temp:       TempSrc:       SpO2: 96% 98% 96%    Weight:         Const: Awake and alert, NAD   Head: Atraumatic  Eyes: Normal Conjunctiva  ENT: Normal External Ears, Nose and Mouth.  Neck: Full range of motion. No meningismus.  Resp: Normal respiratory effort, No distress, CTAB  Cardio: Equal and intact distal pulses, RRR, AV fistula right forearm with palpable thrill  Abd: Soft, non tender, non distended.   Skin: No petechiae or rashes  Ext: No cyanosis, or edema  Neur: Awake and alert, slight weakness to right upper and lower extremity, no  noted facial droop  Psych: Normal Mood and Affect    Data:  Results for orders placed or performed during the hospital encounter of 11/27/23   CBC auto differential   Result Value Ref Range    WBC 10.17 3.90 - 12.70 K/uL    RBC 2.85 (L) 4.60 - 6.20 M/uL    Hemoglobin 8.6 (L) 14.0 - 18.0 g/dL    Hematocrit 27.5 (L) 40.0 - 54.0 %    MCV 97 82 - 98 fL    MCH 30.2 27.0 - 31.0 pg    MCHC 31.3 (L) 32.0 - 36.0 g/dL    RDW 15.0 (H) 11.5 - 14.5 %    Platelets 316 150 - 450 K/uL    MPV 9.8 9.2 - 12.9 fL    Immature Granulocytes 1.1 (H) 0.0 - 0.5 %    Gran # (ANC) 6.6 1.8 - 7.7 K/uL    Immature Grans (Abs) 0.11 (H) 0.00 - 0.04 K/uL    Lymph # 1.8 1.0 - 4.8 K/uL    Mono # 1.0 0.3 - 1.0 K/uL    Eos # 0.5 0.0 - 0.5 K/uL    Baso # 0.10 0.00 - 0.20 K/uL    nRBC 0 0 /100 WBC    Gran % 65.0 38.0 - 73.0 %    Lymph % 17.7 (L) 18.0 - 48.0 %    Mono % 9.9 4.0 - 15.0 %    Eosinophil % 5.3 0.0 - 8.0 %    Basophil % 1.0 0.0 - 1.9 %    Differential Method Automated    Troponin I #1   Result Value Ref Range    Troponin I 0.096 (H) 0.000 - 0.026 ng/mL   B-Type natriuretic peptide (BNP)   Result Value Ref Range     (H) 0 - 99 pg/mL   Comprehensive metabolic panel   Result Value Ref Range    Sodium 144 136 - 145 mmol/L    Potassium 5.7 (H) 3.5 - 5.1 mmol/L    Chloride 111 (H) 95 - 110 mmol/L    CO2 16 (L) 23 - 29 mmol/L    Glucose 110 70 - 110 mg/dL     (H) 6 - 20 mg/dL    Creatinine 14.0 (H) 0.5 - 1.4 mg/dL    Calcium 8.4 (L) 8.7 - 10.5 mg/dL    Total Protein 7.1 6.0 - 8.4 g/dL    Albumin 3.2 (L) 3.5 - 5.2 g/dL    Total Bilirubin 0.7 0.1 - 1.0 mg/dL    Alkaline Phosphatase 78 55 - 135 U/L    AST 11 10 - 40 U/L    ALT 15 10 - 44 U/L    eGFR 4.0 (A) >60 mL/min/1.73 m^2    Anion Gap 17 (H) 8 - 16 mmol/L   APTT   Result Value Ref Range    aPTT 26.4 21.0 - 32.0 sec   Protime-INR   Result Value Ref Range    Prothrombin Time 11.1 9.0 - 12.5 sec    INR 1.0 0.8 - 1.2   CBC auto differential   Result Value Ref Range    WBC 9.59 3.90 - 12.70  K/uL    RBC 2.89 (L) 4.60 - 6.20 M/uL    Hemoglobin 8.6 (L) 14.0 - 18.0 g/dL    Hematocrit 28.2 (L) 40.0 - 54.0 %    MCV 98 82 - 98 fL    MCH 29.8 27.0 - 31.0 pg    MCHC 30.5 (L) 32.0 - 36.0 g/dL    RDW 14.9 (H) 11.5 - 14.5 %    Platelets 296 150 - 450 K/uL    MPV 9.8 9.2 - 12.9 fL    Immature Granulocytes 0.8 (H) 0.0 - 0.5 %    Gran # (ANC) 6.6 1.8 - 7.7 K/uL    Immature Grans (Abs) 0.08 (H) 0.00 - 0.04 K/uL    Lymph # 1.8 1.0 - 4.8 K/uL    Mono # 0.7 0.3 - 1.0 K/uL    Eos # 0.4 0.0 - 0.5 K/uL    Baso # 0.10 0.00 - 0.20 K/uL    nRBC 0 0 /100 WBC    Gran % 68.8 38.0 - 73.0 %    Lymph % 18.4 18.0 - 48.0 %    Mono % 7.0 4.0 - 15.0 %    Eosinophil % 4.0 0.0 - 8.0 %    Basophil % 1.0 0.0 - 1.9 %    Differential Method Automated    POCT glucose   Result Value Ref Range    POCT Glucose 93 70 - 110 mg/dL   POCT glucose   Result Value Ref Range    POC Glucose 179 (A) 70 - 110 MG/DL   POCT glucose   Result Value Ref Range    POCT Glucose 179 (H) 70 - 110 mg/dL     *Note: Due to a large number of results and/or encounters for the requested time period, some results have not been displayed. A complete set of results can be found in Results Review.     Imaging Results              X-Ray Chest AP Portable (Final result)  Result time 11/27/23 04:48:13      Final result by Otis Haque MD (11/27/23 04:48:13)                   Impression:      Mild cardiomegaly with chronic coarse interstitial attenuation.  No confluent airspace consolidation appreciated.      Electronically signed by: Otis Haque MD  Date:    11/27/2023  Time:    04:48               Narrative:    EXAMINATION:  XR CHEST AP PORTABLE    CLINICAL HISTORY:  Chest Pain;    TECHNIQUE:  Single frontal view of the chest was performed.    COMPARISON:  05/01/2023    FINDINGS:  Cardiac monitoring leads overlie the chest.  The cardiac silhouette is mildly enlarged.  Lungs demonstrate mild chronic coarse interstitial attenuation.  No large confluent airspace  consolidation identified.  No significant volume of pleural fluid or pneumothorax appreciated.  No acute skeletal abnormality identified.                                    12-LEAD EKG INTERPRETATION BY ME:  Rate/Rhythm: Normal Sinus Rhythm with rate of 84 beats per minute  QRS, ST, T-waves: No changes consistent with acute ischemia, LVH, slightly peaked TW lateral leads  Impression:  NSR, LVH, peaked T-waves    Labs & Imaging studies were reviewed independently by me.     Medical Decision Makin-year-old male presenting to the emergency department with chest pain.  He does have a cardiac history, concerning for ACS.  EKG does not reveal any acute ischemic changes.  His T-waves are slightly peaked, and he was found to be hyperkalemic, potassium 5.7.  He was given calcium, insulin/glucose, and Lokelma.  He was also uremic,  creating a metabolic acidosis, though neurologically intact.  He has a chronic anemia, stable at baseline, doubt symptomatic anemia.  His BNP is only minimally elevated, down from prior and his troponin is only minimally elevated, also down from prior.  My suspicion for severe fluid overload at this time is low.  Chest x-ray does not reveal any pulmonary edema in his O2 saturation is normal on room air.  Plan on admission to hospital medicine for dialysis due to hyperkalemia and further ACS rule out.    Critical Care Time: 32 minutes  Treatments/Evaluations: Close monitoring and treatment of unstable vital signs, cardiorespiratory, and neurologic status, while maintaining tight balance of fluid, respiratory, and cardiac interventions. This time includes discussing the case with the patient and the patients family. This time does not include all procedures stated elsewhere in this record. This time also includes reviewing old records, labs and radiological studies. This time includes examining and re-examining the patient. Additionally, this time also includes arranging care with  admitting and consulting physicians.       Clinical Impression:  1. Hyperkalemia    2. Chest pain    3. Anemia, unspecified type    4. Uremia    5. ESRD on hemodialysis                 Nallely Soliman MD  11/27/23 0658

## 2023-11-27 NOTE — HPI
48 y.o. male w/ h/o CAD s/p PCI x2 (not on DAPT for u/k reasons), CVA w/ residual RSW, RCC s/p BL nephrectomy on HD MWF; p/w intermittent CP x5 days, burning quality, 7/10 at worst, worse at night, not associated w/ exertion.  Most recent CP episode was associated w/ SOB & lasted 5 hrs, which prompted his presentation & admission. He does not make urine. Received SL NG in ED w/ relief of CP. He was hypertensive w/o tachycardia & had good SpO2 ORA. Trop 0.096 -> 0.108 -> 0.141; ; K 5.7. ECG w/ NSR w/ peaked T waves; K shifted in ED. CXR w/o congestion; otherwise u/r. Started on ACS protocol.    Cardiology consulted for ACS.

## 2023-11-27 NOTE — ASSESSMENT & PLAN NOTE
Creatine stable for now. BMP reviewed- noted Estimated Creatinine Clearance: 6.2 mL/min (A) (based on SCr of 14 mg/dL (H)). according to latest data. Based on current GFR, CKD stage is end stage.  Monitor UOP and serial BMP and adjust therapy as needed. Renally dose meds. Avoid nephrotoxic medications and procedures.      Nephrology consulted

## 2023-11-27 NOTE — SUBJECTIVE & OBJECTIVE
Past Medical History:   Diagnosis Date    CAD (coronary artery disease), native coronary artery 11/21/2019    Cataract     Diabetes mellitus     Diabetic retinopathy     Dialysis patient     DM type 2 causing renal disease, not at goal     ESRD (end stage renal disease) started dialysis 01/2014 06/05/2014    Hyperparathyroidism, secondary renal 06/05/2014    Hypertension     NSTEMI (non-ST elevated myocardial infarction) 12/21/2013    Organ transplant candidate 06/05/2014    Pneumonia     Renal cell carcinoma of right kidney     Renal hypertension     Stroke        Past Surgical History:   Procedure Laterality Date    ANGIOGRAM, CORONARY, WITH LEFT HEART CATHETERIZATION N/A 7/1/2021    Procedure: Angiogram, Coronary, with Left Heart Cath;  Surgeon: Miki Zendejas MD;  Location: [facility] CATH LAB;  Service: Cardiology;  Laterality: N/A;    COLONOSCOPY N/A 5/3/2017    Procedure: COLONOSCOPY;  Surgeon: Rufino Carpenter MD;  Location: [facility] ([address] [place] [address]);  Service: Endoscopy;  Laterality: N/A;  pt states can only schedule on Wednesdays    COLONOSCOPY N/A 2/9/2021    Procedure: COLONOSCOPY;  Surgeon: Edil Wong MD;  [facility]  Service: Endoscopy;  Laterality: N/A;  Dialysis MWF/ labwork day of procedure  right arm aceess  per Dr. Colin pt can hold Plavix 5 days prior see note- sm  COVID test on 2/6/21 at [facility] - sm    COLONOSCOPY N/A 2/10/2021    Procedure: COLONOSCOPY;  Surgeon: Robert Ayers MD;  [facility]  Service: Endoscopy;  Laterality: N/A;  rescheduled due to poor bowel prep-BB  negative covid screening 2/6/21-BB  dialysis M-W-F-BB  okay to r/s for 2/9/21 and to hold Plavix per Dr. KIRAN Wong-BB  labs same day-BB    CORONARY STENT PLACEMENT N/A 7/25/2019    Procedure: INSERTION, STENT, CORONARY ARTERY;  Surgeon: Miki Zendejas MD;  Location: [facility] CATH LAB;  Service: Cardiology;  Laterality: N/A;    DIALYSIS FISTULA CREATION      FISTULOGRAM N/A 2/18/2019    Procedure:  Fistulogram;  Surgeon: Yaneth De La Cruz MD;  Location: [facility] CATH LAB;  Service: Cardiology;  Laterality: N/A;    FISTULOGRAM Right 7/23/2019    Procedure: Fistulogram;  Surgeon: Oz Cordoba MD;  Location: [facility] CATH LAB;  Service: Cardiology;  Laterality: Right;    LAPAROSCOPIC ROBOT-ASSISTED SURGICAL REMOVAL OF KIDNEY USING DA JAIME XI Right 5/19/2022    Procedure: XI ROBOTIC NEPHRECTOMY;  Surgeon: Aidan Hendricks MD;  Location: [facility] OR [facility] FLR;  Service: Urology;  Laterality: Right;  3hrs    LAPAROSCOPIC ROBOT-ASSISTED SURGICAL REMOVAL OF KIDNEY USING DA JAIME XI Left 1/5/2023    Procedure: XI ROBOTIC NEPHRECTOMY;  Surgeon: Aidan Hendricks MD;  Location: [facility] OR [facility] FLR;  Service: Urology;  Laterality: Left;  4 hrs    LEFT HEART CATHETERIZATION Left 7/25/2019    Procedure: Left heart cath;  Surgeon: Miki Zendejas MD;  Location: [facility] CATH LAB;  Service: Cardiology;  Laterality: Left;    REPAIR  1/5/2023    Procedure: REPAIR; COLOTOMY;  Surgeon: Maikel Lamb MD;  [address];  Service: General;;    RETINAL LASER PROCEDURE Bilateral 2018 or 2017    Dr. Rothman    VASCULAR SURGERY         Review of patient's allergies indicates:   Allergen Reactions    No known allergies        Current Facility-Administered Medications on File Prior to Encounter   Medication    lidocaine (PF) 10 mg/ml (1%) injection 10 mg     Current Outpatient Medications on File Prior to Encounter   Medication Sig    amLODIPine (NORVASC) 10 MG tablet Take 1 tablet (10 mg total) by mouth once daily.    aspirin (ECOTRIN) 81 MG EC tablet Take 1 tablet (81 mg total) by mouth once daily.    AURYXIA 210 mg iron Tab Take 420 mg by mouth 3 (three) times daily with meals. Take 1 with snacks    carvediloL (COREG) 25 MG tablet TAKE 1 TABLET BY MOUTH TWICE A DAY WITH FOOD    ciclopirox (PENLAC) 8 % Soln Apply topically nightly.    cinacalcet (SENSIPAR) 90 MG Tab Take 1 tablet by mouth once daily.    hydrALAZINE (APRESOLINE)  25 MG tablet Take 1 tablet (25 mg total) by mouth every 8 (eight) hours. for SBP > 140mm HG    polyethylene glycol (GLYCOLAX) 17 gram/dose powder Use cap to measure 17 g, then mix in liquid and drink by mouth once daily.    sevelamer carbonate (RENVELA) 800 mg Tab Take 800 mg by mouth 3 (three) times daily with meals.     Family History       Problem Relation (Age of Onset)    Cancer Maternal Grandfather    Cataracts Mother    Diabetes Mother    Heart disease Brother    Hypertension Mother, Father, Sister, Brother    Kidney disease Brother          Tobacco Use    Smoking status: Never    Smokeless tobacco: Never   Substance and Sexual Activity    Alcohol use: Not Currently     Comment: One drink a month    Drug use: No    Sexual activity: Yes     Partners: Female     Birth control/protection: None     Review of Systems   Constitutional:  Negative for chills, fatigue and fever.   HENT:  Negative for sinus pressure and sinus pain.    Eyes:  Negative for visual disturbance.   Respiratory:  Positive for chest tightness and shortness of breath. Negative for cough.    Cardiovascular:  Positive for chest pain. Negative for palpitations and leg swelling.   Gastrointestinal:  Negative for abdominal distention, abdominal pain, constipation, diarrhea, nausea and vomiting.   Musculoskeletal:  Negative for back pain.   Skin:  Negative for rash.   Neurological:  Negative for light-headedness and headaches.   Hematological:  Does not bruise/bleed easily.     Objective:     Vital Signs (Most Recent):  Temp: 98 °F (36.7 °C) (11/27/23 0238)  Pulse: 92 (11/27/23 0320)  Resp: 16 (11/27/23 0320)  BP: (!) 182/96 (11/27/23 0320)  SpO2: 98 % (11/27/23 0320) Vital Signs (24h Range):  Temp:  [98 °F (36.7 °C)] 98 °F (36.7 °C)  Pulse:  [81-92] 92  Resp:  [16-20] 16  SpO2:  [96 %-98 %] 98 %  BP: (182-203)/(90-96) 182/96     Weight: 69.9 kg (154 lb 1.6 oz)  Body mass index is 24.14 kg/m².     Physical Exam  Vitals and nursing note reviewed.    Constitutional:       General: He is not in acute distress.     Appearance: Normal appearance. He is not ill-appearing.   HENT:      Head: Normocephalic and atraumatic.   Eyes:      General: No scleral icterus.     Pupils: Pupils are equal, round, and reactive to light.   Cardiovascular:      Rate and Rhythm: Normal rate and regular rhythm.      Pulses: Normal pulses.      Heart sounds: Normal heart sounds.      Comments: Hypertensive on the monitor  Pulmonary:      Effort: Pulmonary effort is normal. No respiratory distress.      Breath sounds: Normal breath sounds.   Abdominal:      General: Abdomen is flat. There is no distension.      Palpations: Abdomen is soft.      Tenderness: There is no abdominal tenderness.   Musculoskeletal:      Right lower leg: No edema.      Left lower leg: No edema.      Comments: Right forearm AVF   Lymphadenopathy:      Cervical: No cervical adenopathy.   Skin:     General: Skin is warm and dry.      Capillary Refill: Capillary refill takes less than 2 seconds.   Neurological:      General: No focal deficit present.      Mental Status: He is alert and oriented to person, place, and time.              CRANIAL NERVES     CN III, IV, VI   Pupils are equal, round, and reactive to light.       Significant Labs: All pertinent labs within the past 24 hours have been reviewed.    Significant Imaging: I have reviewed all pertinent imaging results/findings within the past 24 hours.

## 2023-11-27 NOTE — H&P
[facility] - Emergency Dept  [facility]  History & Physical    Patient Name: Haroldo Dickinson  MRN: [id number]  Patient Class: OP- Observation  Admission Date: 11/27/2023  Attending Physician: Apple Arredondo MD   Primary Care Provider: Tess Ledbetter MD         Patient information was obtained from patient, past medical records, and ER records.     Subjective:     Principal Problem:Chest pain    Chief Complaint:   Chief Complaint   Patient presents with    Chest Pain     X 5 hrs. Given 324 ASA,3 sprays nitro, 1in nitro paste        HPI: Patient is a 48-year-old male with past medical history significant for CAD with previous PCI x2, CVA with residual right-sided weakness, RCC status post nephrectomy, ESRD Monday Wednesday Friday, hypertensive kidney disease who is presenting with a 5 day of intermittent chest pain.  Patient describes the chest pain as waxing and waning with a burning quality.  He rates it as a 7/10 at its worst.  He states that it is worse at night and seems to happen more often at that time.  It is not associated with exertion or emotional stress.  The patient of note does not take any aspirin or clopidogrel as he states that a physician here told him not to despite having PCI.  Patient states that he last got dialysis on Friday and feels that he is slightly fluid overloaded.  He had an episode of chest pain today that was associated with shortness of breath lasting 5 hours which prompted him to call an ambulance.  He denies any dizziness or diaphoresis associated with these episodes and states that after calling EMS, he felt better immediately.  In route to the emergency department, he received 1 dose of nitro with relief of his chest discomfort.  In the emergency room patient was hypertensive, non tachycardic on room air without discomfort.  Demonstrating no evidence of fluid overload on chest x-ray.  BNP was 179.  Troponins were slightly elevated but decreased compared to baseline at 0.096.   Potassium was markedly elevated at 5.7 with evidence of peaked T-waves on EKG and he received calcium, insulin/glucose and Lokelma.  He does not make any urine due to his bilateral nephrectomy.  He received 3 space of nitro and 325 of aspirin.           Past Medical History:   Diagnosis Date    CAD (coronary artery disease), native coronary artery 11/21/2019    Cataract     Diabetes mellitus     Diabetic retinopathy     Dialysis patient     DM type 2 causing renal disease, not at goal     ESRD (end stage renal disease) started dialysis 01/2014 06/05/2014    Hyperparathyroidism, secondary renal 06/05/2014    Hypertension     NSTEMI (non-ST elevated myocardial infarction) 12/21/2013    Organ transplant candidate 06/05/2014    Pneumonia     Renal cell carcinoma of right kidney     Renal hypertension     Stroke        Past Surgical History:   Procedure Laterality Date    ANGIOGRAM, CORONARY, WITH LEFT HEART CATHETERIZATION N/A 7/1/2021    Procedure: Angiogram, Coronary, with Left Heart Cath;  Surgeon: Miki Zendejas MD;  Location: [facility] CATH LAB;  Service: Cardiology;  Laterality: N/A;    COLONOSCOPY N/A 5/3/2017    Procedure: COLONOSCOPY;  Surgeon: Rufino Carpenter MD;  [facility]  Service: Endoscopy;  Laterality: N/A;  pt states can only schedule on Wednesdays    COLONOSCOPY N/A 2/9/2021    Procedure: COLONOSCOPY;  Surgeon: Edil Wong MD;  [facility]  Service: Endoscopy;  Laterality: N/A;  Dialysis MWF/ labwork day of procedure  right arm aceess  per Dr. Colin pt can hold Plavix 5 days prior see note- sm  COVID test on 2/6/21 at [facility] -     COLONOSCOPY N/A 2/10/2021    Procedure: COLONOSCOPY;  Surgeon: Robert Ayers MD;  [facility]  Service: Endoscopy;  Laterality: N/A;  rescheduled due to poor bowel prep-BB  negative covid screening 2/6/21-BB  dialysis M-W-F-BB  okay to r/s for 2/9/21 and to hold Plavix per Dr. KIRAN Wong-BB  labs same day-BB    CORONARY  STENT PLACEMENT N/A 7/25/2019    Procedure: INSERTION, STENT, CORONARY ARTERY;  Surgeon: Miki Zendejas MD;  Location: [facility] CATH LAB;  Service: Cardiology;  Laterality: N/A;    DIALYSIS FISTULA CREATION      FISTULOGRAM N/A 2/18/2019    Procedure: Fistulogram;  Surgeon: Yaneth De La Cruz MD;  Location: [facility] CATH LAB;  Service: Cardiology;  Laterality: N/A;    FISTULOGRAM Right 7/23/2019    Procedure: Fistulogram;  Surgeon: Oz Cordoba MD;  Location: [facility] CATH LAB;  Service: Cardiology;  Laterality: Right;    LAPAROSCOPIC ROBOT-ASSISTED SURGICAL REMOVAL OF KIDNEY USING DA JAIME XI Right 5/19/2022    Procedure: XI ROBOTIC NEPHRECTOMY;  Surgeon: Aidan Hendricks MD;  Location: [facility] OR [facility] FLR;  Service: Urology;  Laterality: Right;  3hrs    LAPAROSCOPIC ROBOT-ASSISTED SURGICAL REMOVAL OF KIDNEY USING DA JAIME XI Left 1/5/2023    Procedure: XI ROBOTIC NEPHRECTOMY;  Surgeon: Aidan Hendricks MD;  Location: [facility] OR [facility] FLR;  Service: Urology;  Laterality: Left;  4 hrs    LEFT HEART CATHETERIZATION Left 7/25/2019    Procedure: Left heart cath;  Surgeon: Miki Zendejas MD;  Location: [facility] CATH LAB;  Service: Cardiology;  Laterality: Left;    REPAIR  1/5/2023    Procedure: REPAIR; COLOTOMY;  Surgeon: Maikel Lamb MD;  Location: [facility] OR [facility] FLR;  Service: General;;    RETINAL LASER PROCEDURE Bilateral 2018 or 2017    Dr. Rothman    VASCULAR SURGERY         Review of patient's allergies indicates:   Allergen Reactions    No known allergies        Current Facility-Administered Medications on File Prior to Encounter   Medication    lidocaine (PF) 10 mg/ml (1%) injection 10 mg     Current Outpatient Medications on File Prior to Encounter   Medication Sig    amLODIPine (NORVASC) 10 MG tablet Take 1 tablet (10 mg total) by mouth once daily.    aspirin (ECOTRIN) 81 MG EC tablet Take 1 tablet (81 mg total) by mouth once daily.    AURYXIA 210 mg iron Tab Take 420 mg by mouth 3 (three) times daily with meals.  Take 1 with snacks    carvediloL (COREG) 25 MG tablet TAKE 1 TABLET BY MOUTH TWICE A DAY WITH FOOD    ciclopirox (PENLAC) 8 % Soln Apply topically nightly.    cinacalcet (SENSIPAR) 90 MG Tab Take 1 tablet by mouth once daily.    hydrALAZINE (APRESOLINE) 25 MG tablet Take 1 tablet (25 mg total) by mouth every 8 (eight) hours. for SBP > 140mm HG    polyethylene glycol (GLYCOLAX) 17 gram/dose powder Use cap to measure 17 g, then mix in liquid and drink by mouth once daily.    sevelamer carbonate (RENVELA) 800 mg Tab Take 800 mg by mouth 3 (three) times daily with meals.     Family History       Problem Relation (Age of Onset)    Cancer Maternal Grandfather    Cataracts Mother    Diabetes Mother    Heart disease Brother    Hypertension Mother, Father, Sister, Brother    Kidney disease Brother          Tobacco Use    Smoking status: Never    Smokeless tobacco: Never   Substance and Sexual Activity    Alcohol use: Not Currently     Comment: One drink a month    Drug use: No    Sexual activity: Yes     Partners: Female     Birth control/protection: None     Review of Systems   Constitutional:  Negative for chills, fatigue and fever.   HENT:  Negative for sinus pressure and sinus pain.    Eyes:  Negative for visual disturbance.   Respiratory:  Positive for chest tightness and shortness of breath. Negative for cough.    Cardiovascular:  Positive for chest pain. Negative for palpitations and leg swelling.   Gastrointestinal:  Negative for abdominal distention, abdominal pain, constipation, diarrhea, nausea and vomiting.   Musculoskeletal:  Negative for back pain.   Skin:  Negative for rash.   Neurological:  Negative for light-headedness and headaches.   Hematological:  Does not bruise/bleed easily.     Objective:     Vital Signs (Most Recent):  Temp: 98 °F (36.7 °C) (11/27/23 0238)  Pulse: 92 (11/27/23 0320)  Resp: 16 (11/27/23 0320)  BP: (!) 182/96 (11/27/23 0320)  SpO2: 98 % (11/27/23 0320) Vital Signs (24h Range):  Temp:   [98 °F (36.7 °C)] 98 °F (36.7 °C)  Pulse:  [81-92] 92  Resp:  [16-20] 16  SpO2:  [96 %-98 %] 98 %  BP: (182-203)/(90-96) 182/96     Weight: 69.9 kg (154 lb 1.6 oz)  Body mass index is 24.14 kg/m².     Physical Exam  Vitals and nursing note reviewed.   Constitutional:       General: He is not in acute distress.     Appearance: Normal appearance. He is not ill-appearing.   HENT:      Head: Normocephalic and atraumatic.   Eyes:      General: No scleral icterus.     Pupils: Pupils are equal, round, and reactive to light.   Cardiovascular:      Rate and Rhythm: Normal rate and regular rhythm.      Pulses: Normal pulses.      Heart sounds: Normal heart sounds.      Comments: Hypertensive on the monitor  Pulmonary:      Effort: Pulmonary effort is normal. No respiratory distress.      Breath sounds: Normal breath sounds.   Abdominal:      General: Abdomen is flat. There is no distension.      Palpations: Abdomen is soft.      Tenderness: There is no abdominal tenderness.   Musculoskeletal:      Right lower leg: No edema.      Left lower leg: No edema.      Comments: Right forearm AVF   Lymphadenopathy:      Cervical: No cervical adenopathy.   Skin:     General: Skin is warm and dry.      Capillary Refill: Capillary refill takes less than 2 seconds.   Neurological:      General: No focal deficit present.      Mental Status: He is alert and oriented to person, place, and time.              CRANIAL NERVES     CN III, IV, VI   Pupils are equal, round, and reactive to light.       Significant Labs: All pertinent labs within the past 24 hours have been reviewed.    Significant Imaging: I have reviewed all pertinent imaging results/findings within the past 24 hours.  Assessment/Plan:     * Chest pain  Patient is a 48-year-old male with past medical history significant for bilateral RCC status post nephrectomy, ESRD on HD Monday Wednesday Friday presenting with a 5 day history of waxing/waning chest.     The patient states that  "they (+) have chest pain. It has sudden onset. It (--) associated with exertion. It (--) relieved by rest. It (+) relieved by nitroglycerin.  There is no radiation.  It is associated with shortness of breath and is not affected by position although appears to be more common nocturnally.  Patient states that he sleeps on 2 pillows but overall has no trouble sleeping flat.  He is currently not hypoxic on room air and resting comfortably prior to me entering the room.    Patient's history is consistent with atypical cardiac chest pain however lack of trop rise suggestive of UA rather than NSTEMI.  He has a ALONDRA score of 4.     EKG shows some nonspecific ST depression in 2,3 and AVF but appears to be stable since may. Peaked T waves also noted in precordial leads.    Plan:  - 81 mg ASA in AM  - load with 325 mg ASA   - Previously told to stop clopidogrel but does not know why  - cardiology consult for UA eval  - Heparin gtt for minimum 48 hours  - continue carvedilol 25 mg b.i.d.; target HR approx 60  - GTN PRN for chest pain   - RBC transfusion if Hgb < 8 g/dL  - No results found for: "RETIC"         Hyperkalemia  This patient has hyperkalemia which is uncontrolled. We will monitor for arrhythmias with EKG or continuous telemetry. We will treat the hyperkalemia with Potassium Binders, Calcium gluconate, and IV insulin and dextrose. The likely etiology of the hyperkalemia is ESRD.  The patients latest potassium has been reviewed and the results are listed below  Recent Labs   Lab 11/27/23  0340   K 5.7*     Patient was given calcium gluconate, Lokelma, and insulin/glucose in the emergency department.  He has no kidney so he produces no urine thus no furosemide was given.  Nephrology is consulted for renal clearance and the patient's previous HD session was on Friday.    Plan:  - Renal diet  - sodium zirconium cyclosilicate 10g TID x6 doses  - If no HD performed by afternoon, would repeat BMP (not ordered) to check " "K        Chronic kidney disease-mineral and bone disorder  Creatine stable for now. BMP reviewed- noted Estimated Creatinine Clearance: 6.2 mL/min (A) (based on SCr of 14 mg/dL (H)). according to latest data. Based on current GFR, CKD stage is end stage.  Monitor UOP and serial BMP and adjust therapy as needed. Renally dose meds. Avoid nephrotoxic medications and procedures.      Nephrology consulted    Anemia in ESRD (end-stage renal disease)  Patient's anemia is currently controlled. Has not received any PRBCs to date. Etiology likely d/t chronic disease due to Chronic Kidney Disease/ESRD  Current CBC reviewed-   Lab Results   Component Value Date    HGB 8.6 (L) 11/27/2023    HCT 27.5 (L) 11/27/2023     Monitor serial CBC and transfuse if patient becomes hemodynamically unstable, symptomatic or H/H drops below 7/21.      Plan:  - CBC with differential, iron studies, CMP  POC Hematocrit   Date Value Ref Range Status   11/30/2022 38 36 - 54 %PCV Final     Hematocrit   Date Value Ref Range Status   11/27/2023 27.5 (L) 40.0 - 54.0 % Final     MCH   Date Value Ref Range Status   11/27/2023 30.2 27.0 - 31.0 pg Final     MCV   Date Value Ref Range Status   11/27/2023 97 82 - 98 fL Final   No results found for: "RETIC"No results found for: "BILIRUTOT"      Essential hypertension  Patient states that his blood pressure at home is typically in the 160s systolic.  He never has normal blood pressures in the 140s over 90s.  He takes carvedilol 25 b.i.d., amlodipine 10, and hydralazine 25 t.i.d..  He does not check his blood pressure at home.  Chronic, uncontrolled. Latest blood pressure and vitals reviewed-     Patient likely had a component of renovascular hypertension now likely represents essential hypertension given the lack of kidneys.    Temp:  [98 °F (36.7 °C)]   Pulse:  [81-92]   Resp:  [16-20]   BP: (172-203)/(81-96)   SpO2:  [96 %-98 %] .   Home meds for hypertension were reviewed and noted below.   Hypertension " Medications               amLODIPine (NORVASC) 10 MG tablet Take 1 tablet (10 mg total) by mouth once daily.    carvediloL (COREG) 25 MG tablet TAKE 1 TABLET BY MOUTH TWICE A DAY WITH FOOD    hydrALAZINE (APRESOLINE) 25 MG tablet Take 1 tablet (25 mg total) by mouth every 8 (eight) hours. for SBP > 140mm HG            While in the hospital, will manage blood pressure as follows; Continue home antihypertensive regimen    Will utilize p.r.n. blood pressure medication only if patient's blood pressure greater than 140/90 and he develops symptoms such as worsening chest pain or shortness of breath.    Plan:  - amlodipine 10, carvedilol 25, and hydralazine 25 t.i.d.. Consider switching to nifedipine 60 BID for trial of different CCB.  - Target /90 unless otherwise indicated  - Lifestyle modifications (DASH diet, Mediterranean diet, weight loss with target BMI 18-25, at least 150 minute moderate intensity exercise/week)  - Risk factor modification (smoking cessation, HbA1C < 6.5, Minimize alcohol intake with no more than 1-2 glasses of beer or wine per day or 10 or less drinks per week)  - Statin therapy as indicated by The 10-year ASCVD risk score (Juanpablo CHILDS, et al., 2019) is: 20.2%    Values used to calculate the score:      Age: 48 years      Sex: Male      Is Non- : Yes      Diabetic: Yes      Tobacco smoker: No      Systolic Blood Pressure: 172 mmHg      Is BP treated: Yes      HDL Cholesterol: 51 mg/dL      Total Cholesterol: 144 mg/dL  - Target LDL < 130, HDL > 40  - Magnesium > 2, Potassium > 4      CAD (coronary artery disease), native coronary artery  Patient with known CAD s/p stent placement, which is uncontrolled Will continue ASA and Statin and monitor for S/Sx of angina/ACS. Continue to monitor on telemetry.       TTE 5/23/23  The left ventricle is normal in size with concentric hypertrophy and normal systolic function.  The estimated ejection fraction is 60%.  Grade III left  ventricular diastolic dysfunction.  Mild mitral regurgitation.  Normal right ventricular size with normal right ventricular systolic function.  The estimated PA systolic pressure is 41 mmHg.  Intermediate central venous pressure (8 mmHg).  Severe left atrial enlargement.  Trivial circumferential pericardial effusion.  There is pulmonary hypertension.    [facility] 7/21:  The Mid RCA lesion was 100% stenosed.  The Mid LAD lesion was 90% stenosed.  The estimated blood loss was <50 mL.  There was two vessel coronary artery disease.  Despite abnormal stress would recommend proceed with transplant evaluation.     [facility] 7/19:  A STENT RESOLUTE HAYLEY [id number] stent was successfully placed at 18 ADRIANA for 5 sec.  A STENT RESOLUTE HAYLEY [id number] stent was successfully placed at 18 ADRIANA for 5 sec.  Estimated blood loss: none  Single vessel coronary artery disease.  Successful PCI.  Dual antiplatelet therapy for 3 months.    End stage renal failure on dialysis  Noted and explained in other entries.    Controlled type 2 diabetes mellitus with CKD  Creatine stable for now. BMP reviewed- noted Estimated Creatinine Clearance: 6.2 mL/min (A) (based on SCr of 14 mg/dL (H)). according to latest data. Based on current GFR, CKD stage is end stage.  Monitor UOP and serial BMP and adjust therapy as needed. Renally dose meds. Avoid nephrotoxic medications and procedures.    Patient's T2DM resolved following his BL nephrectomy and is currently diet controlled per the patient. No insulin ordered at this time.    Hemoglobin A1C   Date Value Ref Range Status   05/02/2023 4.6 4.0 - 5.6 % Final     Comment:     ADA Screening Guidelines:  5.7-6.4%  Consistent with prediabetes  >or=6.5%  Consistent with diabetes    High levels of fetal hemoglobin interfere with the HbA1C  assay. Heterozygous hemoglobin variants (HbS, HgC, etc)do  not significantly interfere with this assay.   However, presence of multiple variants may affect accuracy.     12/29/2022 4.9 4.0  - 5.6 % Final     Comment:     ADA Screening Guidelines:  5.7-6.4%  Consistent with prediabetes  >or=6.5%  Consistent with diabetes    High levels of fetal hemoglobin interfere with the HbA1C  assay. Heterozygous hemoglobin variants (HbS, HgC, etc)do  not significantly interfere with this assay.   However, presence of multiple variants may affect accuracy.     05/12/2022 4.7 4.0 - 5.6 % Final     Comment:     ADA Screening Guidelines:  5.7-6.4%  Consistent with prediabetes  >or=6.5%  Consistent with diabetes    High levels of fetal hemoglobin interfere with the HbA1C  assay. Heterozygous hemoglobin variants (HbS, HgC, etc)do  not significantly interfere with this assay.   However, presence of multiple variants may affect accuracy.             VTE Risk Mitigation (From admission, onward)           Ordered     heparin 25,000 units in dextrose 5% (100 units/ml) IV bolus from bag - ADDITIONAL PRN BOLUS - 60 units/kg (max bolus 4000 units)  As needed (PRN)        Question:  Heparin Infusion Adjustment (DO NOT MODIFY ANSWER)  Answer:  \[email or web address]\[email or web address]\Images\Pharmacy\HeparinInfusions\heparin LOW INTENSITY nomogram for [facility] [id number]    11/27/23 0524     heparin 25,000 units in dextrose 5% (100 units/ml) IV bolus from bag - ADDITIONAL PRN BOLUS - 30 units/kg (max bolus 4000 units)  As needed (PRN)        Question:  Heparin Infusion Adjustment (DO NOT MODIFY ANSWER)  Answer:  \\[email or web address]\epic\Images\Pharmacy\HeparinInfusions\heparin LOW INTENSITY nomogram for [facility] [id number]    11/27/23 0524     heparin 25,000 units in dextrose 5% (100 units/ml) IV bolus from bag INITIAL BOLUS (max bolus 4000 units)  Once        Question:  Heparin Infusion Adjustment (DO NOT MODIFY ANSWER)  Answer:  \\[email or web address]\epic\Images\Pharmacy\HeparinInfusions\heparin LOW INTENSITY nomogram for [facility] [id number]    11/27/23 0524     heparin 25,000 units in dextrose 5% 250 mL (100 units/mL) infusion LOW INTENSITY nomogram - [facility]  Continuous         Question:  Begin at (units/kg/hr)  Answer:  12    11/27/23 0524     IP VTE HIGH RISK PATIENT  Once         11/27/23 0450     Place sequential compression device  Until discontinued         11/27/23 0450                       On 11/27/2023, patient should be placed in hospital observation services under my care.             Ilir Li MD  Department of Hospital Medicine  [facility] - Emergency Dept

## 2023-11-27 NOTE — Clinical Note
ID band present and verified. Family is in the lobby. S/p left knee arthroplasty  Cultures sent and started on ancef q 7 days  Nerve block in place    Plan:  Defer to primary for pain and abx management  Defer to primary for drain removal  Recommend broad spectrum abx till cultures complete with growth or ngtd  PT/OT evaluation in AM  Recommend d/c [facility] within 24 hrs after procedure

## 2023-11-27 NOTE — SUBJECTIVE & OBJECTIVE
Past Medical History:   Diagnosis Date    CAD (coronary artery disease), native coronary artery 11/21/2019    Cataract     Diabetes mellitus     Diabetic retinopathy     Dialysis patient     DM type 2 causing renal disease, not at goal     ESRD (end stage renal disease) started dialysis 01/2014 06/05/2014    Hyperparathyroidism, secondary renal 06/05/2014    Hypertension     NSTEMI (non-ST elevated myocardial infarction) 12/21/2013    Organ transplant candidate 06/05/2014    Pneumonia     Renal cell carcinoma of right kidney     Renal hypertension     Stroke        Past Surgical History:   Procedure Laterality Date    ANGIOGRAM, CORONARY, WITH LEFT HEART CATHETERIZATION N/A 7/1/2021    Procedure: Angiogram, Coronary, with Left Heart Cath;  Surgeon: Miki Zendejas MD;  Location: [facility] CATH LAB;  Service: Cardiology;  Laterality: N/A;    COLONOSCOPY N/A 5/3/2017    Procedure: COLONOSCOPY;  Surgeon: Rufino Carpenter MD;  Location: [facility] ([address] [place] [address]);  Service: Endoscopy;  Laterality: N/A;  pt states can only schedule on Wednesdays    COLONOSCOPY N/A 2/9/2021    Procedure: COLONOSCOPY;  Surgeon: Edil Wong MD;  [facility]  Service: Endoscopy;  Laterality: N/A;  Dialysis MWF/ labwork day of procedure  right arm aceess  per Dr. Colin pt can hold Plavix 5 days prior see note- sm  COVID test on 2/6/21 at [facility] - sm    COLONOSCOPY N/A 2/10/2021    Procedure: COLONOSCOPY;  Surgeon: Robert Ayers MD;  [facility]  Service: Endoscopy;  Laterality: N/A;  rescheduled due to poor bowel prep-BB  negative covid screening 2/6/21-BB  dialysis M-W-F-BB  okay to r/s for 2/9/21 and to hold Plavix per Dr. KIRAN Wong-BB  labs same day-BB    CORONARY STENT PLACEMENT N/A 7/25/2019    Procedure: INSERTION, STENT, CORONARY ARTERY;  Surgeon: Miki Zendejas MD;  Location: [facility] CATH LAB;  Service: Cardiology;  Laterality: N/A;    DIALYSIS FISTULA CREATION      FISTULOGRAM N/A 2/18/2019    Procedure:  Fistulogram;  Surgeon: Yaneth De La Cruz MD;  Location: [facility] CATH LAB;  Service: Cardiology;  Laterality: N/A;    FISTULOGRAM Right 7/23/2019    Procedure: Fistulogram;  Surgeon: Oz Cordoba MD;  Location: [facility] CATH LAB;  Service: Cardiology;  Laterality: Right;    LAPAROSCOPIC ROBOT-ASSISTED SURGICAL REMOVAL OF KIDNEY USING DA JAIME XI Right 5/19/2022    Procedure: XI ROBOTIC NEPHRECTOMY;  Surgeon: Aidan Hendricks MD;  Location: [address] FLR;  Service: Urology;  Laterality: Right;  3hrs    LAPAROSCOPIC ROBOT-ASSISTED SURGICAL REMOVAL OF KIDNEY USING DA JAIME XI Left 1/5/2023    Procedure: XI ROBOTIC NEPHRECTOMY;  Surgeon: Aidan Hendricks MD;  [facility];  Service: Urology;  Laterality: Left;  4 hrs    LEFT HEART CATHETERIZATION Left 7/25/2019    Procedure: Left heart cath;  Surgeon: Miki Zendejas MD;  Location: [facility] CATH LAB;  Service: Cardiology;  Laterality: Left;    REPAIR  1/5/2023    Procedure: REPAIR; COLOTOMY;  Surgeon: Maikel Lamb MD;  [address];  Service: General;;    RETINAL LASER PROCEDURE Bilateral 2018 or 2017    Dr. Rothman    VASCULAR SURGERY         Review of patient's allergies indicates:   Allergen Reactions    No known allergies      Current Facility-Administered Medications   Medication Frequency    acetaminophen tablet 1,000 mg Q8H PRN    acetaminophen tablet 650 mg Q4H PRN    amLODIPine tablet 10 mg Daily    aspirin chewable tablet 81 mg Daily    carvediloL tablet 25 mg BID WM    cinacalcet tablet 90 mg Daily    dextrose 10% bolus 125 mL 125 mL PRN    dextrose 10% bolus 250 mL 250 mL PRN    glucagon (human recombinant) injection 1 mg PRN    glucose chewable tablet 16 g PRN    glucose chewable tablet 24 g PRN    heparin 25,000 units in dextrose 5% (100 units/ml) IV bolus from bag - ADDITIONAL PRN BOLUS - 30 units/kg (max bolus 4000 units) PRN    heparin 25,000 units in dextrose 5% (100 units/ml) IV bolus from bag - ADDITIONAL PRN BOLUS  - 60 units/kg (max bolus 4000 units) PRN    heparin 25,000 units in dextrose 5% 250 mL (100 units/mL) infusion LOW INTENSITY nomogram - [facility] Continuous    hydrALAZINE tablet 50 mg Q8H    melatonin tablet 6 mg Nightly PRN    naloxone 0.4 mg/mL injection 0.02 mg PRN    nitroGLYCERIN SL tablet 0.4 mg Q5 Min PRN    polyethylene glycol packet 17 g BID    sevelamer carbonate tablet 800 mg TID WM    sodium chloride 0.9% flush 10 mL PRN    sodium zirconium cyclosilicate packet 10 g TID     Facility-Administered Medications Ordered in Other Encounters   Medication Frequency    lidocaine (PF) 10 mg/ml (1%) injection 10 mg Once     Family History       Problem Relation (Age of Onset)    Cancer Maternal Grandfather    Cataracts Mother    Diabetes Mother    Heart disease Brother    Hypertension Mother, Father, Sister, Brother    Kidney disease Brother          Tobacco Use    Smoking status: Never    Smokeless tobacco: Never   Substance and Sexual Activity    Alcohol use: Not Currently     Comment: One drink a month    Drug use: No    Sexual activity: Yes     Partners: Female     Birth control/protection: None     Review of Systems   Constitutional:  Negative for activity change, chills and fatigue.   HENT:  Negative for congestion, facial swelling, postnasal drip, rhinorrhea, sinus pressure, sinus pain and sneezing.    Respiratory:  Positive for shortness of breath. Negative for cough and chest tightness.    Cardiovascular:  Positive for chest pain. Negative for palpitations and leg swelling.   Gastrointestinal:  Negative for abdominal distention, constipation, diarrhea, nausea and vomiting.   Musculoskeletal:  Negative for arthralgias, gait problem and myalgias.   Skin:  Negative for color change, rash and wound.   Neurological:  Negative for dizziness, light-headedness and headaches.   Psychiatric/Behavioral:  Negative for agitation, behavioral problems and confusion.      Objective:     Vital Signs (Most Recent):  Temp: 98 °F  (36.7 °C) (11/27/23 0238)  Pulse: 76 (11/27/23 1116)  Resp: 18 (11/27/23 0703)  BP: (!) 178/82 (11/27/23 0845)  SpO2: 95 % (11/27/23 0845) Vital Signs (24h Range):  Temp:  [98 °F (36.7 °C)] 98 °F (36.7 °C)  Pulse:  [72-92] 76  Resp:  [16-20] 18  SpO2:  [95 %-98 %] 95 %  BP: (172-203)/(81-96) 178/82     Weight: 69.9 kg (154 lb 1.6 oz) (11/27/23 0238)  Body mass index is 24.14 kg/m².  Body surface area is 1.82 meters squared.    I/O last 3 completed shifts:  In: 300 [IV Piggyback:300]  Out: -      Physical Exam  Vitals and nursing note reviewed.   Constitutional:       General: He is not in acute distress.     Appearance: Normal appearance. He is not ill-appearing or toxic-appearing.   HENT:      Head: Normocephalic and atraumatic.      Nose: No congestion or rhinorrhea.      Mouth/Throat:      Mouth: Mucous membranes are moist.      Pharynx: No oropharyngeal exudate or posterior oropharyngeal erythema.   Eyes:      General: No scleral icterus.        Right eye: No discharge.         Left eye: No discharge.      Extraocular Movements: Extraocular movements intact.      Conjunctiva/sclera: Conjunctivae normal.   Cardiovascular:      Rate and Rhythm: Normal rate and regular rhythm.      Heart sounds: No murmur heard.     No friction rub. No gallop.      Comments: RFA AVF  Pulmonary:      Effort: Pulmonary effort is normal. No respiratory distress.      Breath sounds: No wheezing or rales.   Abdominal:      General: Bowel sounds are normal. There is no distension.      Palpations: Abdomen is soft.      Tenderness: There is no abdominal tenderness.   Musculoskeletal:         General: No swelling.      Cervical back: Normal range of motion.      Right lower leg: No edema.      Left lower leg: No edema.   Skin:     General: Skin is warm and dry.   Neurological:      General: No focal deficit present.      Mental Status: He is alert and oriented to person, place, and time.          Significant Labs:  CBC:   Recent Labs   Lab  11/27/23  0529   WBC 9.59   RBC 2.89*   HGB 8.6*   HCT 28.2*      MCV 98   MCH 29.8   MCHC 30.5*     CMP:   Recent Labs   Lab 11/27/23  0340 11/27/23  0822    221*   CALCIUM 8.4* 8.2*   ALBUMIN 3.2*  --    PROT 7.1  --     138   K 5.7* 4.7   CO2 16* 17*   * 106   * 101*   CREATININE 14.0* 13.8*   ALKPHOS 78  --    ALT 15  --    AST 11  --    BILITOT 0.7  --

## 2023-11-27 NOTE — Clinical Note
The catheter was inserted into the left ventricle. Hemodynamics were performed.  and Pullback was recorded.  EDP= 27

## 2023-11-27 NOTE — CONSULTS
"[facility]  Nephrology  Consult Note    Patient Name: Haroldo Dickinson  MRN: [id number]  Admission Date: 11/27/2023  Hospital Length of Stay: 0 days  Attending Provider: Apple Arredondo MD   Primary Care Physician: Tess Ledbetter MD  Principal Problem:Chest pain    Inpatient consult to Nephrology  Consult performed by: Roderick Burleson NP  Consult ordered by: Ilir Li MD  Reason for consult: ESRD        Subjective:     HPI: Haroldo Dickinson is a 47 yo male with CAD, CVA, RCC s/p bilateral nephrectomy, HTN, and ESRD on iHD MWF who presents to the hospital for CP evaluation.  Pain is located at the mid-clavicular line that does not radiate and pain "comes and goes".  He denies any dizziness, headaches, N/V or diarrhea.  He does endorse SOB but is oxygenating well on RA.  EKG without acute ischemic findings, , Troponin 0.096~0.108.  He was started on heparin gtt and admitted to hospital medicine for further management with cardiology consultation.  Nephrology has been consulted for ESRD management while IP.       Past Medical History:   Diagnosis Date    CAD (coronary artery disease), native coronary artery 11/21/2019    Cataract     Diabetes mellitus     Diabetic retinopathy     Dialysis patient     DM type 2 causing renal disease, not at goal     ESRD (end stage renal disease) started dialysis 01/2014 06/05/2014    Hyperparathyroidism, secondary renal 06/05/2014    Hypertension     NSTEMI (non-ST elevated myocardial infarction) 12/21/2013    Organ transplant candidate 06/05/2014    Pneumonia     Renal cell carcinoma of right kidney     Renal hypertension     Stroke        Past Surgical History:   Procedure Laterality Date    ANGIOGRAM, CORONARY, WITH LEFT HEART CATHETERIZATION N/A 7/1/2021    Procedure: Angiogram, Coronary, with Left Heart Cath;  Surgeon: Miki Zendejas MD;  [facility] CATH LAB;  Service: Cardiology;  Laterality: N/A;    COLONOSCOPY N/A 5/3/2017    Procedure: COLONOSCOPY; "  Surgeon: Rufino Carpenter MD;  Location: [facility] ENDO (4TH FLR);  Service: Endoscopy;  Laterality: N/A;  pt states can only schedule on Wednesdays    COLONOSCOPY N/A 2/9/2021    Procedure: COLONOSCOPY;  Surgeon: Edil Wong MD;  Location: [facility] (4TH FLR);  Service: Endoscopy;  Laterality: N/A;  Dialysis MWF/ labwork day of procedure  right arm aceess  per Dr. Colin pt can hold Plavix 5 days prior see note- sm  COVID test on 2/6/21 at [facility] -     COLONOSCOPY N/A 2/10/2021    Procedure: COLONOSCOPY;  Surgeon: Robert Ayers MD;  Location: [facility] (4TH FLR);  Service: Endoscopy;  Laterality: N/A;  rescheduled due to poor bowel prep-BB  negative covid screening 2/6/21-BB  dialysis M-W-F-BB  okay to r/s for 2/9/21 and to hold Plavix per Dr. KIRAN Wong-BB  labs same day-BB    CORONARY STENT PLACEMENT N/A 7/25/2019    Procedure: INSERTION, STENT, CORONARY ARTERY;  Surgeon: Miki Zendejas MD;  Location: [facility] CATH LAB;  Service: Cardiology;  Laterality: N/A;    DIALYSIS FISTULA CREATION      FISTULOGRAM N/A 2/18/2019    Procedure: Fistulogram;  Surgeon: Yaneth De La Cruz MD;  Location: [facility] CATH LAB;  Service: Cardiology;  Laterality: N/A;    FISTULOGRAM Right 7/23/2019    Procedure: Fistulogram;  Surgeon: Oz Cordoba MD;  Location: [facility] CATH LAB;  Service: Cardiology;  Laterality: Right;    LAPAROSCOPIC ROBOT-ASSISTED SURGICAL REMOVAL OF KIDNEY USING DA JAIME XI Right 5/19/2022    Procedure: XI ROBOTIC NEPHRECTOMY;  Surgeon: Aidan Hendricks MD;  Location: [address];  Service: Urology;  Laterality: Right;  3hrs    LAPAROSCOPIC ROBOT-ASSISTED SURGICAL REMOVAL OF KIDNEY USING DA JAIME XI Left 1/5/2023    Procedure: XI ROBOTIC NEPHRECTOMY;  Surgeon: Aidan Hendricks MD;  Location: [address];  Service: Urology;  Laterality: Left;  4 hrs    LEFT HEART CATHETERIZATION Left 7/25/2019    Procedure: Left heart cath;  Surgeon: Miki Zendejas MD;  Location: [facility] CATH LAB;  Service: Cardiology;   Laterality: Left;    REPAIR  1/5/2023    Procedure: REPAIR; COLOTOMY;  Surgeon: Maikel Lamb MD;  Location: [facility] OR [address] [place] [address];  Service: General;;    RETINAL LASER PROCEDURE Bilateral 2018 or 2017    Dr. Rothman    VASCULAR SURGERY         Review of patient's allergies indicates:   Allergen Reactions    No known allergies      Current Facility-Administered Medications   Medication Frequency    acetaminophen tablet 1,000 mg Q8H PRN    acetaminophen tablet 650 mg Q4H PRN    amLODIPine tablet 10 mg Daily    aspirin chewable tablet 81 mg Daily    carvediloL tablet 25 mg BID WM    cinacalcet tablet 90 mg Daily    dextrose 10% bolus 125 mL 125 mL PRN    dextrose 10% bolus 250 mL 250 mL PRN    glucagon (human recombinant) injection 1 mg PRN    glucose chewable tablet 16 g PRN    glucose chewable tablet 24 g PRN    heparin 25,000 units in dextrose 5% (100 units/ml) IV bolus from bag - ADDITIONAL PRN BOLUS - 30 units/kg (max bolus 4000 units) PRN    heparin 25,000 units in dextrose 5% (100 units/ml) IV bolus from bag - ADDITIONAL PRN BOLUS - 60 units/kg (max bolus 4000 units) PRN    heparin 25,000 units in dextrose 5% 250 mL (100 units/mL) infusion LOW INTENSITY nomogram - [facility] Continuous    hydrALAZINE tablet 50 mg Q8H    melatonin tablet 6 mg Nightly PRN    naloxone 0.4 mg/mL injection 0.02 mg PRN    nitroGLYCERIN SL tablet 0.4 mg Q5 Min PRN    polyethylene glycol packet 17 g BID    sevelamer carbonate tablet 800 mg TID WM    sodium chloride 0.9% flush 10 mL PRN    sodium zirconium cyclosilicate packet 10 g TID     Facility-Administered Medications Ordered in Other Encounters   Medication Frequency    lidocaine (PF) 10 mg/ml (1%) injection 10 mg Once     Family History       Problem Relation (Age of Onset)    Cancer Maternal Grandfather    Cataracts Mother    Diabetes Mother    Heart disease Brother    Hypertension Mother, Father, Sister, Brother    Kidney disease Brother          Tobacco Use    Smoking status: Never     Smokeless tobacco: Never   Substance and Sexual Activity    Alcohol use: Not Currently     Comment: One drink a month    Drug use: No    Sexual activity: Yes     Partners: Female     Birth control/protection: None     Review of Systems   Constitutional:  Negative for activity change, chills and fatigue.   HENT:  Negative for congestion, facial swelling, postnasal drip, rhinorrhea, sinus pressure, sinus pain and sneezing.    Respiratory:  Positive for shortness of breath. Negative for cough and chest tightness.    Cardiovascular:  Positive for chest pain. Negative for palpitations and leg swelling.   Gastrointestinal:  Negative for abdominal distention, constipation, diarrhea, nausea and vomiting.   Musculoskeletal:  Negative for arthralgias, gait problem and myalgias.   Skin:  Negative for color change, rash and wound.   Neurological:  Negative for dizziness, light-headedness and headaches.   Psychiatric/Behavioral:  Negative for agitation, behavioral problems and confusion.      Objective:     Vital Signs (Most Recent):  Temp: 98 °F (36.7 °C) (11/27/23 0238)  Pulse: 76 (11/27/23 1116)  Resp: 18 (11/27/23 0703)  BP: (!) 178/82 (11/27/23 0845)  SpO2: 95 % (11/27/23 0845) Vital Signs (24h Range):  Temp:  [98 °F (36.7 °C)] 98 °F (36.7 °C)  Pulse:  [72-92] 76  Resp:  [16-20] 18  SpO2:  [95 %-98 %] 95 %  BP: (172-203)/(81-96) 178/82     Weight: 69.9 kg (154 lb 1.6 oz) (11/27/23 0238)  Body mass index is 24.14 kg/m².  Body surface area is 1.82 meters squared.    I/O last 3 completed shifts:  In: 300 [IV Piggyback:300]  Out: -      Physical Exam  Vitals and nursing note reviewed.   Constitutional:       General: He is not in acute distress.     Appearance: Normal appearance. He is not ill-appearing or toxic-appearing.   HENT:      Head: Normocephalic and atraumatic.      Nose: No congestion or rhinorrhea.      Mouth/Throat:      Mouth: Mucous membranes are moist.      Pharynx: No oropharyngeal exudate or posterior  oropharyngeal erythema.   Eyes:      General: No scleral icterus.        Right eye: No discharge.         Left eye: No discharge.      Extraocular Movements: Extraocular movements intact.      Conjunctiva/sclera: Conjunctivae normal.   Cardiovascular:      Rate and Rhythm: Normal rate and regular rhythm.      Heart sounds: No murmur heard.     No friction rub. No gallop.      Comments: RFA AVF  Pulmonary:      Effort: Pulmonary effort is normal. No respiratory distress.      Breath sounds: No wheezing or rales.   Abdominal:      General: Bowel sounds are normal. There is no distension.      Palpations: Abdomen is soft.      Tenderness: There is no abdominal tenderness.   Musculoskeletal:         General: No swelling.      Cervical back: Normal range of motion.      Right lower leg: No edema.      Left lower leg: No edema.   Skin:     General: Skin is warm and dry.   Neurological:      General: No focal deficit present.      Mental Status: He is alert and oriented to person, place, and time.          Significant Labs:  CBC:   Recent Labs   Lab 11/27/23  0529   WBC 9.59   RBC 2.89*   HGB 8.6*   HCT 28.2*      MCV 98   MCH 29.8   MCHC 30.5*     CMP:   Recent Labs   Lab 11/27/23  0340 11/27/23  0822    221*   CALCIUM 8.4* 8.2*   ALBUMIN 3.2*  --    PROT 7.1  --     138   K 5.7* 4.7   CO2 16* 17*   * 106   * 101*   CREATININE 14.0* 13.8*   ALKPHOS 78  --    ALT 15  --    AST 11  --    BILITOT 0.7  --      Assessment/Plan:     Renal/  Hyperkalemia  HD today  -renal diet  -lokelma    End stage renal failure on dialysis  ESRD on iHD MWF  Dr. Butt  4 hours  ALBARO AVF  EDW ~ 71 kg  No residual renal fx    Plan/Recommendations:  -HD today for metabolic clearance/volume managemern  -does not appear volume overloaded on exam, will UF 1L as tolerated  -low blood flow rates  -continue strict I/O's  -RFP daily  -renal diet when advanced, on home phos binders and cinacalcet        Oncology  Anemia  in ESRD (end-stage renal disease)  Will start EPO if he remains in-house > 3 days  -defer to OP dialysis unit for titration      Roderick Beltran, NP  Nephrology  [facility]

## 2023-11-27 NOTE — HPI
"Haroldo Dickinson is a 47 yo male with CAD, CVA, RCC s/p bilateral nephrectomy, HTN, and ESRD on iHD MWF who presents to the hospital for CP evaluation.  Pain is located at the mid-clavicular line that does not radiate and pain "comes and goes".  He denies any dizziness, headaches, N/V or diarrhea.  He does endorse SOB but is oxygenating well on RA.  EKG without acute ischemic findings, , Troponin 0.096~0.108.  He was started on heparin gtt and admitted to hospital medicine for further management with cardiology consultation.  Nephrology has been consulted for ESRD management while IP.     "

## 2023-11-27 NOTE — ASSESSMENT & PLAN NOTE
Patient with known CAD s/p stent placement, which is uncontrolled Will continue ASA and Statin and monitor for S/Sx of angina/ACS. Continue to monitor on telemetry.       TTE 5/23/23  The left ventricle is normal in size with concentric hypertrophy and normal systolic function.  The estimated ejection fraction is 60%.  Grade III left ventricular diastolic dysfunction.  Mild mitral regurgitation.  Normal right ventricular size with normal right ventricular systolic function.  The estimated PA systolic pressure is 41 mmHg.  Intermediate central venous pressure (8 mmHg).  Severe left atrial enlargement.  Trivial circumferential pericardial effusion.  There is pulmonary hypertension.    [facility] 7/21:  The Mid RCA lesion was 100% stenosed.  The Mid LAD lesion was 90% stenosed.  The estimated blood loss was <50 mL.  There was two vessel coronary artery disease.  Despite abnormal stress would recommend proceed with transplant evaluation.     [facility] 7/19:  A STENT RESOLUTE HAYLEY [id number] stent was successfully placed at 18 ADRIANA for 5 sec.  A STENT RESOLUTE HAYLEY [id number] stent was successfully placed at 18 ADRIANA for 5 sec.  Estimated blood loss: none  Single vessel coronary artery disease.  Successful PCI.  Dual antiplatelet therapy for 3 months.

## 2023-11-27 NOTE — ASSESSMENT & PLAN NOTE
This patient has hyperkalemia which is uncontrolled. We will monitor for arrhythmias with EKG or continuous telemetry. We will treat the hyperkalemia with Potassium Binders, Calcium gluconate, and IV insulin and dextrose. The likely etiology of the hyperkalemia is ESRD.  The patients latest potassium has been reviewed and the results are listed below  Recent Labs   Lab 11/27/23  0340   K 5.7*     Patient was given calcium gluconate, Lokelma, and insulin/glucose in the emergency department.  He has no kidney so he produces no urine thus no furosemide was given.  Nephrology is consulted for renal clearance and the patient's previous HD session was on Friday.    Plan:  - Renal diet  - sodium zirconium cyclosilicate 10g TID x6 doses  - If no HD performed by afternoon, would repeat BMP (not ordered) to check K

## 2023-11-27 NOTE — ASSESSMENT & PLAN NOTE
Creatine stable for now. BMP reviewed- noted Estimated Creatinine Clearance: 6.2 mL/min (A) (based on SCr of 14 mg/dL (H)). according to latest data. Based on current GFR, CKD stage is end stage.  Monitor UOP and serial BMP and adjust therapy as needed. Renally dose meds. Avoid nephrotoxic medications and procedures.    Patient's T2DM resolved following his BL nephrectomy and is currently diet controlled per the patient. No insulin ordered at this time.    Hemoglobin A1C   Date Value Ref Range Status   05/02/2023 4.6 4.0 - 5.6 % Final     Comment:     ADA Screening Guidelines:  5.7-6.4%  Consistent with prediabetes  >or=6.5%  Consistent with diabetes    High levels of fetal hemoglobin interfere with the HbA1C  assay. Heterozygous hemoglobin variants (HbS, HgC, etc)do  not significantly interfere with this assay.   However, presence of multiple variants may affect accuracy.     12/29/2022 4.9 4.0 - 5.6 % Final     Comment:     ADA Screening Guidelines:  5.7-6.4%  Consistent with prediabetes  >or=6.5%  Consistent with diabetes    High levels of fetal hemoglobin interfere with the HbA1C  assay. Heterozygous hemoglobin variants (HbS, HgC, etc)do  not significantly interfere with this assay.   However, presence of multiple variants may affect accuracy.     05/12/2022 4.7 4.0 - 5.6 % Final     Comment:     ADA Screening Guidelines:  5.7-6.4%  Consistent with prediabetes  >or=6.5%  Consistent with diabetes    High levels of fetal hemoglobin interfere with the HbA1C  assay. Heterozygous hemoglobin variants (HbS, HgC, etc)do  not significantly interfere with this assay.   However, presence of multiple variants may affect accuracy.

## 2023-11-27 NOTE — CARE UPDATE
Hypertensive, otherwise VSSAF.     BP improving after AM meds. Will titrate antihypertensives as needed.     Trending troponin. Cards to see patient. Continue heparin gtt and ASA for now.     Hyperkalemic on admission, shifted with calcium gluconate, insulin, dextrose and K binders. Repeat K wnl after shift. Nephrology following for HD needs.       Mathieu Esteban PA-C  Hospital Medicine [facility]

## 2023-11-27 NOTE — PROGRESS NOTES
Patient arrived to the acute dialysis unit by wheelchair. Observation dialysis started via 15 gauge fistula needles x 2 to RFA fistula. Heparin gtt infusing at 12 units/kg. No complaints of pain or discomfort.

## 2023-11-27 NOTE — ASSESSMENT & PLAN NOTE
ESRD on iHD MWF  Dr. Butt  4 hours  ALBARO AVF  EDW ~ 71 kg  No residual renal fx    Plan/Recommendations:  -HD today for metabolic clearance/volume managemern  -does not appear volume overloaded on exam, will UF 1L as tolerated  -low blood flow rates  -continue strict I/O's  -RFP daily  -renal diet when advanced, on home phos binders and cinacalcet

## 2023-11-27 NOTE — ED NOTES
Received report from Lori Michel RN.  Assumed care of patient at this time. Pt placed in hospital gown and currently lying in stretcher. Vital signs are stable, provided pt with warm blanket. Pt denied restroom use. Pt placed on continuous cardiac and pulse ox monitoring with blood pressure to cycle every 30 minutes.  VS stable; NSR noted.  Bed locked in lowest position; side rails up and locked x 2; call light, bedside table, and personal belongings within reach.  Pt instructed to alert nurse for assistance before attempting to get out of bed; verbalizes understanding.  Pt denies needs or complaints at this time; will continue to monitor pt.

## 2023-11-28 PROBLEM — I21.4 NSTEMI (NON-ST ELEVATED MYOCARDIAL INFARCTION): Status: ACTIVE | Noted: 2023-11-27

## 2023-11-28 LAB
ABO + RH BLD: NORMAL
ALBUMIN SERPL BCP-MCNC: 3 G/DL (ref 3.5–5.2)
ALP SERPL-CCNC: 76 U/L (ref 55–135)
ALT SERPL W/O P-5'-P-CCNC: 12 U/L (ref 10–44)
ANION GAP SERPL CALC-SCNC: 14 MMOL/L (ref 8–16)
APTT PPP: 26.1 SEC (ref 21–32)
APTT PPP: 29.2 SEC (ref 21–32)
APTT PPP: 36.5 SEC (ref 21–32)
AST SERPL-CCNC: 9 U/L (ref 10–40)
BASOPHILS # BLD AUTO: 0.13 K/UL (ref 0–0.2)
BASOPHILS # BLD AUTO: 0.13 K/UL (ref 0–0.2)
BASOPHILS NFR BLD: 1.1 % (ref 0–1.9)
BASOPHILS NFR BLD: 1.1 % (ref 0–1.9)
BILIRUB SERPL-MCNC: 0.8 MG/DL (ref 0.1–1)
BLD GP AB SCN CELLS X3 SERPL QL: NORMAL
BUN SERPL-MCNC: 54 MG/DL (ref 6–20)
CALCIUM SERPL-MCNC: 7.6 MG/DL (ref 8.7–10.5)
CHLORIDE SERPL-SCNC: 101 MMOL/L (ref 95–110)
CO2 SERPL-SCNC: 23 MMOL/L (ref 23–29)
CREAT SERPL-MCNC: 9.4 MG/DL (ref 0.5–1.4)
DIFFERENTIAL METHOD: ABNORMAL
DIFFERENTIAL METHOD: ABNORMAL
EOSINOPHIL # BLD AUTO: 0.5 K/UL (ref 0–0.5)
EOSINOPHIL # BLD AUTO: 0.5 K/UL (ref 0–0.5)
EOSINOPHIL NFR BLD: 4.7 % (ref 0–8)
EOSINOPHIL NFR BLD: 4.7 % (ref 0–8)
ERYTHROCYTE [DISTWIDTH] IN BLOOD BY AUTOMATED COUNT: 14.6 % (ref 11.5–14.5)
ERYTHROCYTE [DISTWIDTH] IN BLOOD BY AUTOMATED COUNT: 14.6 % (ref 11.5–14.5)
EST. GFR  (NO RACE VARIABLE): 6.4 ML/MIN/1.73 M^2
GLUCOSE SERPL-MCNC: 78 MG/DL (ref 70–110)
HCT VFR BLD AUTO: 27.4 % (ref 40–54)
HCT VFR BLD AUTO: 27.4 % (ref 40–54)
HGB BLD-MCNC: 8.7 G/DL (ref 14–18)
HGB BLD-MCNC: 8.7 G/DL (ref 14–18)
IMM GRANULOCYTES # BLD AUTO: 0.06 K/UL (ref 0–0.04)
IMM GRANULOCYTES # BLD AUTO: 0.06 K/UL (ref 0–0.04)
IMM GRANULOCYTES NFR BLD AUTO: 0.5 % (ref 0–0.5)
IMM GRANULOCYTES NFR BLD AUTO: 0.5 % (ref 0–0.5)
LYMPHOCYTES # BLD AUTO: 2 K/UL (ref 1–4.8)
LYMPHOCYTES # BLD AUTO: 2 K/UL (ref 1–4.8)
LYMPHOCYTES NFR BLD: 17.1 % (ref 18–48)
LYMPHOCYTES NFR BLD: 17.1 % (ref 18–48)
MAGNESIUM SERPL-MCNC: 1.7 MG/DL (ref 1.6–2.6)
MCH RBC QN AUTO: 29.7 PG (ref 27–31)
MCH RBC QN AUTO: 29.7 PG (ref 27–31)
MCHC RBC AUTO-ENTMCNC: 31.8 G/DL (ref 32–36)
MCHC RBC AUTO-ENTMCNC: 31.8 G/DL (ref 32–36)
MCV RBC AUTO: 94 FL (ref 82–98)
MCV RBC AUTO: 94 FL (ref 82–98)
MONOCYTES # BLD AUTO: 0.9 K/UL (ref 0.3–1)
MONOCYTES # BLD AUTO: 0.9 K/UL (ref 0.3–1)
MONOCYTES NFR BLD: 8.1 % (ref 4–15)
MONOCYTES NFR BLD: 8.1 % (ref 4–15)
NEUTROPHILS # BLD AUTO: 7.9 K/UL (ref 1.8–7.7)
NEUTROPHILS # BLD AUTO: 7.9 K/UL (ref 1.8–7.7)
NEUTROPHILS NFR BLD: 68.5 % (ref 38–73)
NEUTROPHILS NFR BLD: 68.5 % (ref 38–73)
NRBC BLD-RTO: 0 /100 WBC
NRBC BLD-RTO: 0 /100 WBC
PHOSPHATE SERPL-MCNC: 5.1 MG/DL (ref 2.7–4.5)
PLATELET # BLD AUTO: 324 K/UL (ref 150–450)
PLATELET # BLD AUTO: 324 K/UL (ref 150–450)
PMV BLD AUTO: 10 FL (ref 9.2–12.9)
PMV BLD AUTO: 10 FL (ref 9.2–12.9)
POCT GLUCOSE: 76 MG/DL (ref 70–110)
POCT GLUCOSE: 95 MG/DL (ref 70–110)
POTASSIUM SERPL-SCNC: 4.1 MMOL/L (ref 3.5–5.1)
PROT SERPL-MCNC: 6.9 G/DL (ref 6–8.4)
RBC # BLD AUTO: 2.93 M/UL (ref 4.6–6.2)
RBC # BLD AUTO: 2.93 M/UL (ref 4.6–6.2)
SODIUM SERPL-SCNC: 138 MMOL/L (ref 136–145)
SPECIMEN OUTDATE: NORMAL
TROPONIN I SERPL DL<=0.01 NG/ML-MCNC: 0.51 NG/ML (ref 0–0.03)
TROPONIN I SERPL DL<=0.01 NG/ML-MCNC: 0.53 NG/ML (ref 0–0.03)
TROPONIN I SERPL DL<=0.01 NG/ML-MCNC: 0.54 NG/ML (ref 0–0.03)
WBC # BLD AUTO: 11.49 K/UL (ref 3.9–12.7)
WBC # BLD AUTO: 11.49 K/UL (ref 3.9–12.7)

## 2023-11-28 PROCEDURE — 63600175 PHARM REV CODE 636 W HCPCS: Performed by: STUDENT IN AN ORGANIZED HEALTH CARE EDUCATION/TRAINING PROGRAM

## 2023-11-28 PROCEDURE — 93458 L HRT ARTERY/VENTRICLE ANGIO: CPT | Performed by: INTERNAL MEDICINE

## 2023-11-28 PROCEDURE — 84100 ASSAY OF PHOSPHORUS: CPT | Performed by: STUDENT IN AN ORGANIZED HEALTH CARE EDUCATION/TRAINING PROGRAM

## 2023-11-28 PROCEDURE — 36415 COLL VENOUS BLD VENIPUNCTURE: CPT | Performed by: STUDENT IN AN ORGANIZED HEALTH CARE EDUCATION/TRAINING PROGRAM

## 2023-11-28 PROCEDURE — 93458 L HRT ARTERY/VENTRICLE ANGIO: CPT | Mod: 26,,, | Performed by: INTERNAL MEDICINE

## 2023-11-28 PROCEDURE — 85730 THROMBOPLASTIN TIME PARTIAL: CPT | Mod: 91 | Performed by: HOSPITALIST

## 2023-11-28 PROCEDURE — 86850 RBC ANTIBODY SCREEN: CPT | Performed by: STUDENT IN AN ORGANIZED HEALTH CARE EDUCATION/TRAINING PROGRAM

## 2023-11-28 PROCEDURE — 99223 PR INITIAL HOSPITAL CARE,LEVL III: ICD-10-PCS | Mod: 25,GC,, | Performed by: INTERNAL MEDICINE

## 2023-11-28 PROCEDURE — 85025 COMPLETE CBC W/AUTO DIFF WBC: CPT | Performed by: STUDENT IN AN ORGANIZED HEALTH CARE EDUCATION/TRAINING PROGRAM

## 2023-11-28 PROCEDURE — C1769 GUIDE WIRE: HCPCS | Performed by: INTERNAL MEDICINE

## 2023-11-28 PROCEDURE — 85730 THROMBOPLASTIN TIME PARTIAL: CPT | Performed by: STUDENT IN AN ORGANIZED HEALTH CARE EDUCATION/TRAINING PROGRAM

## 2023-11-28 PROCEDURE — 96366 THER/PROPH/DIAG IV INF ADDON: CPT

## 2023-11-28 PROCEDURE — 84484 ASSAY OF TROPONIN QUANT: CPT | Mod: 91

## 2023-11-28 PROCEDURE — 36415 COLL VENOUS BLD VENIPUNCTURE: CPT

## 2023-11-28 PROCEDURE — 93458 PR CATH PLACE/CORON ANGIO, IMG SUPER/INTERP,W LEFT HEART VENTRICULOGRAPHY: ICD-10-PCS | Mod: 26,,, | Performed by: INTERNAL MEDICINE

## 2023-11-28 PROCEDURE — 25000003 PHARM REV CODE 250: Performed by: STUDENT IN AN ORGANIZED HEALTH CARE EDUCATION/TRAINING PROGRAM

## 2023-11-28 PROCEDURE — 36415 COLL VENOUS BLD VENIPUNCTURE: CPT | Performed by: HOSPITALIST

## 2023-11-28 PROCEDURE — C1894 INTRO/SHEATH, NON-LASER: HCPCS | Performed by: INTERNAL MEDICINE

## 2023-11-28 PROCEDURE — 99152 MOD SED SAME PHYS/QHP 5/>YRS: CPT | Mod: ,,, | Performed by: INTERNAL MEDICINE

## 2023-11-28 PROCEDURE — 83735 ASSAY OF MAGNESIUM: CPT | Performed by: STUDENT IN AN ORGANIZED HEALTH CARE EDUCATION/TRAINING PROGRAM

## 2023-11-28 PROCEDURE — 25000003 PHARM REV CODE 250: Performed by: INTERNAL MEDICINE

## 2023-11-28 PROCEDURE — 25000003 PHARM REV CODE 250

## 2023-11-28 PROCEDURE — 99223 1ST HOSP IP/OBS HIGH 75: CPT | Mod: 25,GC,, | Performed by: INTERNAL MEDICINE

## 2023-11-28 PROCEDURE — C1760 CLOSURE DEV, VASC: HCPCS | Performed by: INTERNAL MEDICINE

## 2023-11-28 PROCEDURE — 80053 COMPREHEN METABOLIC PANEL: CPT | Performed by: STUDENT IN AN ORGANIZED HEALTH CARE EDUCATION/TRAINING PROGRAM

## 2023-11-28 PROCEDURE — 84484 ASSAY OF TROPONIN QUANT: CPT

## 2023-11-28 PROCEDURE — 21400001 HC TELEMETRY ROOM

## 2023-11-28 PROCEDURE — 99152 PR MOD CONSCIOUS SEDATION, SAME PHYS, 5+ YRS, FIRST 15 MIN: ICD-10-PCS | Mod: ,,, | Performed by: INTERNAL MEDICINE

## 2023-11-28 PROCEDURE — 99152 MOD SED SAME PHYS/QHP 5/>YRS: CPT | Performed by: INTERNAL MEDICINE

## 2023-11-28 PROCEDURE — 99153 MOD SED SAME PHYS/QHP EA: CPT | Performed by: INTERNAL MEDICINE

## 2023-11-28 PROCEDURE — 63600175 PHARM REV CODE 636 W HCPCS: Performed by: INTERNAL MEDICINE

## 2023-11-28 RX ORDER — HYDRALAZINE HYDROCHLORIDE 20 MG/ML
10 INJECTION INTRAMUSCULAR; INTRAVENOUS EVERY 8 HOURS PRN
Status: DISCONTINUED | OUTPATIENT
Start: 2023-11-28 | End: 2023-11-29 | Stop reason: HOSPADM

## 2023-11-28 RX ORDER — CLOPIDOGREL 300 MG/1
600 TABLET, FILM COATED ORAL ONCE
Status: DISCONTINUED | OUTPATIENT
Start: 2023-11-28 | End: 2023-11-29

## 2023-11-28 RX ORDER — MIDAZOLAM HYDROCHLORIDE 2 MG/2ML
INJECTION, SOLUTION INTRAMUSCULAR; INTRAVENOUS
Status: DISCONTINUED | OUTPATIENT
Start: 2023-11-28 | End: 2023-11-28 | Stop reason: HOSPADM

## 2023-11-28 RX ORDER — FENTANYL CITRATE 50 UG/ML
INJECTION, SOLUTION INTRAMUSCULAR; INTRAVENOUS
Status: DISCONTINUED | OUTPATIENT
Start: 2023-11-28 | End: 2023-11-28 | Stop reason: HOSPADM

## 2023-11-28 RX ORDER — LABETALOL HCL 20 MG/4 ML
SYRINGE (ML) INTRAVENOUS
Status: DISCONTINUED | OUTPATIENT
Start: 2023-11-28 | End: 2023-11-28 | Stop reason: HOSPADM

## 2023-11-28 RX ORDER — NIFEDIPINE 30 MG/1
60 TABLET, EXTENDED RELEASE ORAL DAILY
Status: DISCONTINUED | OUTPATIENT
Start: 2023-11-28 | End: 2023-11-29 | Stop reason: HOSPADM

## 2023-11-28 RX ORDER — DIPHENHYDRAMINE HCL 50 MG
50 CAPSULE ORAL ONCE
Status: COMPLETED | OUTPATIENT
Start: 2023-11-28 | End: 2023-11-28

## 2023-11-28 RX ORDER — HEPARIN SOD,PORCINE/0.9 % NACL 1000/500ML
INTRAVENOUS SOLUTION INTRAVENOUS
Status: DISCONTINUED | OUTPATIENT
Start: 2023-11-28 | End: 2023-11-28 | Stop reason: HOSPADM

## 2023-11-28 RX ORDER — LIDOCAINE HYDROCHLORIDE 20 MG/ML
INJECTION, SOLUTION EPIDURAL; INFILTRATION; INTRACAUDAL; PERINEURAL
Status: DISCONTINUED | OUTPATIENT
Start: 2023-11-28 | End: 2023-11-28 | Stop reason: HOSPADM

## 2023-11-28 RX ORDER — SODIUM CHLORIDE 9 MG/ML
INJECTION, SOLUTION INTRAVENOUS ONCE
Status: DISCONTINUED | OUTPATIENT
Start: 2023-11-29 | End: 2023-11-29 | Stop reason: HOSPADM

## 2023-11-28 RX ORDER — SODIUM CHLORIDE 0.9 % (FLUSH) 0.9 %
10 SYRINGE (ML) INJECTION
Status: DISCONTINUED | OUTPATIENT
Start: 2023-11-28 | End: 2023-11-29 | Stop reason: HOSPADM

## 2023-11-28 RX ADMIN — CARVEDILOL 25 MG: 25 TABLET, FILM COATED ORAL at 04:11

## 2023-11-28 RX ADMIN — NIFEDIPINE 60 MG: 30 TABLET, FILM COATED, EXTENDED RELEASE ORAL at 08:11

## 2023-11-28 RX ADMIN — HYDRALAZINE HYDROCHLORIDE 50 MG: 50 TABLET ORAL at 05:11

## 2023-11-28 RX ADMIN — HYDRALAZINE HYDROCHLORIDE 50 MG: 50 TABLET ORAL at 09:11

## 2023-11-28 RX ADMIN — HYDRALAZINE HYDROCHLORIDE 50 MG: 50 TABLET ORAL at 03:11

## 2023-11-28 RX ADMIN — ASPIRIN 81 MG CHEWABLE TABLET 81 MG: 81 TABLET CHEWABLE at 08:11

## 2023-11-28 RX ADMIN — HEPARIN SODIUM 14 UNITS/KG/HR: 10000 INJECTION, SOLUTION INTRAVENOUS at 12:11

## 2023-11-28 RX ADMIN — CINACALCET 90 MG: 30 TABLET, FILM COATED ORAL at 08:11

## 2023-11-28 RX ADMIN — SODIUM ZIRCONIUM CYCLOSILICATE 10 G: 10 POWDER, FOR SUSPENSION ORAL at 09:11

## 2023-11-28 RX ADMIN — SODIUM ZIRCONIUM CYCLOSILICATE 10 G: 10 POWDER, FOR SUSPENSION ORAL at 04:11

## 2023-11-28 RX ADMIN — CARVEDILOL 25 MG: 25 TABLET, FILM COATED ORAL at 08:11

## 2023-11-28 RX ADMIN — SEVELAMER CARBONATE 800 MG: 800 TABLET, FILM COATED ORAL at 04:11

## 2023-11-28 RX ADMIN — DIPHENHYDRAMINE HYDROCHLORIDE 50 MG: 50 CAPSULE ORAL at 11:11

## 2023-11-28 NOTE — DISCHARGE INSTRUCTIONS
Discharge Instructions and Care of Your Groin      Catheter Insertion Site  The morning after your procedure, you may take the dressing off. The easiest way to do this is when you are showering, get the tape and dressing wet and remove it.  After the bandage is removed, cover the area with a small adhesive bandage. It is normal for the catheter insertion site to be black and blue for a couple of days. The site may also be slightly swollen and pink, and there may be a small lump (about the size of a quarter) at the site.  Wash the catheter insertion site at least once daily with soap and water. Place soapy water on your hand or washcloth and gently wash the insertion site; do not rub.  Keep the area clean and dry when you are not showering.  Do not use creams, lotions or ointment on the wound site.  Wear loose clothes and loose underwear.  Do not take a bath, tub soak, go in a Jacuzzi, or swim in a pool or lake for one week after the procedure.    Activity Instructions  Do not strain during bowel movements for the first 3 to 4 days after the procedure to prevent bleeding from the catheter insertion site.  Avoid heavy lifting (more than 10 pounds) and pushing or pulling heavy objects for the first 5 to 7 days after the procedure.  Do not participate in strenuous activities for 5 days after the procedure. This includes most sports - jogging, golfing, play tennis, and bowling.  You may climb stairs if needed, but walk up and down the stairs more slowly than usual.  Gradually increase your activities until you reach your normal activity level within one week after the procedure.      If bleeding should occur following discharge:  Sit down and apply firm pressure to the puncture site with your fingers for 10 minutes   If the bleeding stops, continue to sit quietly, keeping your leg straight for 2 hours. Notify your physician as soon as possible   If bleeding does not stop after 10 minutes or if there is a large amount of  bleeding or spurting, call 911 immediately.  Do not drive yourself to the hospital.     Notify your physician if these symptoms persist or if you experience:  Change in color or temperature of the leg  Redness, heat, or pus at the puncture site  Chills or fever greater than 101.0 F

## 2023-11-28 NOTE — PROGRESS NOTES
[facility] Observation [address] [place] [address] Medicine  Progress Note    Patient Name: Haroldo Dickinson  MRN: [id number]  Patient Class: OP- Observation   Admission Date: 11/27/2023  Length of Stay: 0 days  Attending Physician: Apple Arredondo MD  Primary Care Provider: Tess Ledbetter MD        Subjective:     Principal Problem:NSTEMI (non-ST elevated myocardial infarction)        HPI:  Patient is a 48-year-old male with past medical history significant for CAD with previous PCI x2, CVA with residual right-sided weakness, RCC status post nephrectomy, ESRD Monday Wednesday Friday, hypertensive kidney disease who is presenting with a 5 day of intermittent chest pain.  Patient describes the chest pain as waxing and waning with a burning quality.  He rates it as a 7/10 at its worst.  He states that it is worse at night and seems to happen more often at that time.  It is not associated with exertion or emotional stress.  The patient of note does not take any aspirin or clopidogrel as he states that a physician here told him not to despite having PCI.  Patient states that he last got dialysis on Friday and feels that he is slightly fluid overloaded.  He had an episode of chest pain today that was associated with shortness of breath lasting 5 hours which prompted him to call an ambulance.  He denies any dizziness or diaphoresis associated with these episodes and states that after calling EMS, he felt better immediately.  In route to the emergency department, he received 1 dose of nitro with relief of his chest discomfort.  In the emergency room patient was hypertensive, non tachycardic on room air without discomfort.  Demonstrating no evidence of fluid overload on chest x-ray.  BNP was 179.  Troponins were slightly elevated but decreased compared to baseline at 0.096.  Potassium was markedly elevated at 5.7 with evidence of peaked T-waves on EKG and he received calcium, insulin/glucose and Lokelma.  He does not make any urine due to his  bilateral nephrectomy.  He received 3 space of nitro and 325 of aspirin.           Overview/Hospital Course:  Harlodo Dickinson is placed in  Observation for management of chest pain. Chest pain resolving on admission. EKG and CXR reviewed. Hyperkalemic on admission, shifted, with repeat K within normal limits. Troponin trending. Continuing ACS for now. Cardiology following. Nephro follow for HD needs. Dialysis 11/28 during admission. Unable to schedule PET today, will proceed with LHC. Continue heparin gtt.     Interval History: NAEON, hypertensive, otherwise VSSAF. Unable to schedule PET today, will proceed with LHC. Plavix load, ASA, continue Heparin gtt. IC following.      Review of Systems   Constitutional:  Negative for chills, fatigue and fever.   HENT:  Negative for sinus pressure and sinus pain.    Eyes:  Negative for visual disturbance.   Respiratory:  Negative for cough, chest tightness and shortness of breath.    Cardiovascular:  Negative for chest pain, palpitations and leg swelling.   Gastrointestinal:  Negative for abdominal distention, abdominal pain, constipation, diarrhea, nausea and vomiting.   Musculoskeletal:  Negative for back pain.   Skin:  Negative for rash.   Neurological:  Negative for light-headedness and headaches.   Hematological:  Does not bruise/bleed easily.     Objective:     Vital Signs (Most Recent):  Temp: 98.6 °F (37 °C) (11/28/23 0753)  Pulse: 80 (11/28/23 0831)  Resp: 20 (11/28/23 0831)  BP: (!) 176/79 (11/28/23 0831)  SpO2: 96 % (11/28/23 0831) Vital Signs (24h Range):  Temp:  [97.9 °F (36.6 °C)-98.6 °F (37 °C)] 98.6 °F (37 °C)  Pulse:  [64-91] 80  Resp:  [16-20] 20  SpO2:  [93 %-99 %] 96 %  BP: (169-194)/(76-93) 176/79     Weight: 79.3 kg (174 lb 13.2 oz)  Body mass index is 27.38 kg/m².    Intake/Output Summary (Last 24 hours) at 11/28/2023 1036  Last data filed at 11/27/2023 1730  Gross per 24 hour   Intake 300 ml   Output 2650 ml   Net -2350 ml         Physical Exam  Vitals  and nursing note reviewed.   Constitutional:       General: He is not in acute distress.     Appearance: Normal appearance. He is not ill-appearing.   HENT:      Head: Normocephalic and atraumatic.   Eyes:      General: No scleral icterus.     Pupils: Pupils are equal, round, and reactive to light.   Cardiovascular:      Rate and Rhythm: Normal rate and regular rhythm.      Pulses: Normal pulses.      Heart sounds: Normal heart sounds.      Comments: R forearm AVF  Pulmonary:      Effort: Pulmonary effort is normal. No respiratory distress.      Breath sounds: Normal breath sounds.   Abdominal:      General: Abdomen is flat. There is no distension.      Palpations: Abdomen is soft.      Tenderness: There is no abdominal tenderness.   Musculoskeletal:      Right lower leg: No edema.      Left lower leg: No edema.   Lymphadenopathy:      Cervical: No cervical adenopathy.   Skin:     General: Skin is warm and dry.      Capillary Refill: Capillary refill takes less than 2 seconds.   Neurological:      General: No focal deficit present.      Mental Status: He is alert and oriented to person, place, and time.             Significant Labs: All pertinent labs within the past 24 hours have been reviewed.  CBC:   Recent Labs   Lab 11/27/23  0249 11/27/23  0529 11/28/23  0304   WBC 10.17 9.59 11.49  11.49   HGB 8.6* 8.6* 8.7*  8.7*   HCT 27.5* 28.2* 27.4*  27.4*    296 324  324     CMP:   Recent Labs   Lab 11/27/23  0340 11/27/23  0822 11/27/23  1305 11/27/23  1914 11/28/23  0304      < > 141 137 138   K 5.7*   < > 5.3* 3.8 4.1   *   < > 110 100 101   CO2 16*   < > 18* 24 23      < > 80 117* 78   *   < > 104* 44* 54*   CREATININE 14.0*   < > 14.8* 7.7* 9.4*   CALCIUM 8.4*   < > 8.3* 8.1* 7.6*   PROT 7.1  --   --   --  6.9   ALBUMIN 3.2*  --   --   --  3.0*   BILITOT 0.7  --   --   --  0.8   ALKPHOS 78  --   --   --  76   AST 11  --   --   --  9*   ALT 15  --   --   --  12   ANIONGAP 17*   <  > 13 13 14    < > = values in this interval not displayed.       Significant Imaging: I have reviewed all pertinent imaging results/findings within the past 24 hours.    Assessment/Plan:      * NSTEMI (non-ST elevated myocardial infarction)  Patient is a 48-year-old male with past medical history significant for bilateral RCC status post nephrectomy, ESRD on HD Monday Wednesday Friday presenting with a 5 day history of waxing/waning chest.     The patient states that they (+) have chest pain. It has sudden onset. It (--) associated with exertion. It (--) relieved by rest. It (+) relieved by nitroglycerin.  There is no radiation.  It is associated with shortness of breath and is not affected by position although appears to be more common nocturnally.  Patient states that he sleeps on 2 pillows but overall has no trouble sleeping flat.  He is currently not hypoxic on room air and resting comfortably prior to me entering the room.    Patient's history is consistent with atypical cardiac chest pain however lack of trop rise suggestive of UA rather than NSTEMI.  He has a ALONDRA score of 4.     EKG shows some nonspecific ST depression in 2,3 and AVF but appears to be stable since may. Peaked T waves also noted in precordial leads.    Plan:  - 81 mg ASA in AM  - load with 325 mg ASA   - Previously told to stop clopidogrel but does not know why  - cardiology consult for UA eval  - Heparin gtt for minimum 48 hours  - continue carvedilol 25 mg b.i.d.; target HR approx 60  - GTN PRN for chest pain   - RBC transfusion if Hgb < 8 g/dL  - 11/28 - unable to schedule PET >> Interventional Cards to [facility] with [facility]   -- cont heparin, plavix load, cont ASA    Essential hypertension  Hypertensive emergency  - uncontrolled on admission  - home regimen - amlodipine 10mg daily, coreg 25mg bid, and hydralazine 25mg q8h for SBP > 140  - during admission   -- switch amlodipine >> nifedipine 60mg daily    -- hydralazine increased to 50mg  q8h   -- cont coreg 25mg bid  - prn hydralazine for SBP > 180     Hyperkalemia  - hyperkalemic on admission, peaked T waves on EKG on admission  - shifted with potassium binders, calcium gluconate, IV insulin and dextrose  - HD yesterday  - K improving     Chronic kidney disease-mineral and bone disorder  Creatine stable for now. BMP reviewed- noted Estimated Creatinine Clearance: 9.2 mL/min (A) (based on SCr of 9.4 mg/dL (H)). according to latest data. Based on current GFR, CKD stage is end stage.  Monitor UOP and serial BMP and adjust therapy as needed. Renally dose meds. Avoid nephrotoxic medications and procedures.      Nephrology consulted for HD needs.     Anemia in ESRD (end-stage renal disease)  - anemia currently controlled  - no transfusions since admission   - Hgb stable  - CBC daily    CAD (coronary artery disease), native coronary artery  Patient with known CAD s/p stent placement, which is uncontrolled Will continue ASA and Statin and monitor for S/Sx of angina/ACS. Continue to monitor on telemetry.   - last Echo reviewed  - known stent placement  - see NSTEMI    End stage renal failure on dialysis  Noted and explained in other entries.    Controlled type 2 diabetes mellitus with CKD  - patient's T2DM resolved following his BL nephrectomy and is currently diet controlled per the patient. No insulin ordered at this time  - last A1C reviewed  - renally dose meds  - avoid nephrotoxic medications and procedures      VTE Risk Mitigation (From admission, onward)           Ordered     heparin 25,000 units in dextrose 5% (100 units/ml) IV bolus from bag - ADDITIONAL PRN BOLUS - 60 units/kg (max bolus 4000 units)  As needed (PRN)        Question:  Heparin Infusion Adjustment (DO NOT MODIFY ANSWER)  Answer:  [email or web address]\epic\Images\Pharmacy\HeparinInfusions\heparin LOW INTENSITY nomogram for [facility] [id number]    11/27/23 0524     heparin 25,000 units in dextrose 5% (100 units/ml) IV bolus from bag - ADDITIONAL  PRN BOLUS - 30 units/kg (max bolus 4000 units)  As needed (PRN)        Question:  Heparin Infusion Adjustment (DO NOT MODIFY ANSWER)  Answer:  [email or web address]\epic\Images\Pharmacy\HeparinInfusions\heparin LOW INTENSITY nomogram for [facility] [id number].pdf    11/27/23 0524     heparin 25,000 units in dextrose 5% 250 mL (100 units/mL) infusion LOW INTENSITY nomogram - [facility]  Continuous        Question:  Begin at (units/kg/hr)  Answer:  12    11/27/23 0524     IP VTE HIGH RISK PATIENT  Once         11/27/23 0450     Place sequential compression device  Until discontinued         11/27/23 0450                    Discharge Planning   QUIN: 11/29/2023     Code Status: Full Code   Is the patient medically ready for discharge?: No    Reason for patient still in hospital (select all that apply): Patient trending condition, Laboratory test, Treatment, and Consult recommendations  Discharge Plan A: Home                  Mathieu Esteban PA-C  Department of Hospital Medicine   [facility] - Observation 11H

## 2023-11-28 NOTE — SUBJECTIVE & OBJECTIVE
Past Medical History:   Diagnosis Date    CAD (coronary artery disease), native coronary artery 11/21/2019    Cataract     Diabetes mellitus     Diabetic retinopathy     Dialysis patient     DM type 2 causing renal disease, not at goal     ESRD (end stage renal disease) started dialysis 01/2014 06/05/2014    Hyperparathyroidism, secondary renal 06/05/2014    Hypertension     NSTEMI (non-ST elevated myocardial infarction) 12/21/2013    Organ transplant candidate 06/05/2014    Pneumonia     Renal cell carcinoma of right kidney     Renal hypertension     Stroke        Past Surgical History:   Procedure Laterality Date    ANGIOGRAM, CORONARY, WITH LEFT HEART CATHETERIZATION N/A 7/1/2021    Procedure: Angiogram, Coronary, with Left Heart Cath;  Surgeon: Miki Zendejas MD;  Location: [facility] CATH LAB;  Service: Cardiology;  Laterality: N/A;    COLONOSCOPY N/A 5/3/2017    Procedure: COLONOSCOPY;  Surgeon: Rufino Carpenter MD;  Location: [facility] ([address] [place] [address]);  Service: Endoscopy;  Laterality: N/A;  pt states can only schedule on Wednesdays    COLONOSCOPY N/A 2/9/2021    Procedure: COLONOSCOPY;  Surgeon: Edil Wong MD;  [facility]  Service: Endoscopy;  Laterality: N/A;  Dialysis MWF/ labwork day of procedure  right arm aceess  per Dr. Colin pt can hold Plavix 5 days prior see note- sm  COVID test on 2/6/21 at [facility] - sm    COLONOSCOPY N/A 2/10/2021    Procedure: COLONOSCOPY;  Surgeon: Robert Ayers MD;  [facility]  Service: Endoscopy;  Laterality: N/A;  rescheduled due to poor bowel prep-BB  negative covid screening 2/6/21-BB  dialysis M-W-F-BB  okay to r/s for 2/9/21 and to hold Plavix per Dr. KIRAN Wong-BB  labs same day-BB    CORONARY STENT PLACEMENT N/A 7/25/2019    Procedure: INSERTION, STENT, CORONARY ARTERY;  Surgeon: Miki Zendejas MD;  Location: [facility] CATH LAB;  Service: Cardiology;  Laterality: N/A;    DIALYSIS FISTULA CREATION      FISTULOGRAM N/A 2/18/2019    Procedure:  Fistulogram;  Surgeon: Yaneth De La Cruz MD;  Location: [facility] CATH LAB;  Service: Cardiology;  Laterality: N/A;    FISTULOGRAM Right 7/23/2019    Procedure: Fistulogram;  Surgeon: Oz Cordoba MD;  Location: [facility] CATH LAB;  Service: Cardiology;  Laterality: Right;    LAPAROSCOPIC ROBOT-ASSISTED SURGICAL REMOVAL OF KIDNEY USING DA JAIME XI Right 5/19/2022    Procedure: XI ROBOTIC NEPHRECTOMY;  Surgeon: Aidan [place]ricks MD;  Location: [facility] OR [facility] FLR;  Service: Urology;  Laterality: Right;  3hrs    LAPAROSCOPIC ROBOT-ASSISTED SURGICAL REMOVAL OF KIDNEY USING DA JAIME XI Left 1/5/2023    Procedure: XI ROBOTIC NEPHRECTOMY;  Surgeon: Aidan [place]ricks MD;  Location: [facility] OR [facility] FLR;  Service: Urology;  Laterality: Left;  4 hrs    LEFT HEART CATHETERIZATION Left 7/25/2019    Procedure: Left heart cath;  Surgeon: Miki Zendejas MD;  Location: [facility] CATH LAB;  Service: Cardiology;  Laterality: Left;    REPAIR  1/5/2023    Procedure: REPAIR; COLOTOMY;  Surgeon: Maikel Lamb MD;  [address]  Service: General;;    RETINAL LASER PROCEDURE Bilateral 2018 or 2017    Dr. Rothman    VASCULAR SURGERY         Review of patient's allergies indicates:   Allergen Reactions    No known allergies        PTA Medications   Medication Sig    amLODIPine (NORVASC) 10 MG tablet Take 1 tablet (10 mg total) by mouth once daily.    aspirin (ECOTRIN) 81 MG EC tablet Take 1 tablet (81 mg total) by mouth once daily.    AURYXIA 210 mg iron Tab Take 420 mg by mouth 3 (three) times daily with meals. Take 1 with snacks    carvediloL (COREG) 25 MG tablet TAKE 1 TABLET BY MOUTH TWICE A DAY WITH FOOD    ciclopirox (PENLAC) 8 % Soln Apply topically nightly.    cinacalcet (SENSIPAR) 90 MG Tab Take 1 tablet by mouth once daily.    hydrALAZINE (APRESOLINE) 25 MG tablet Take 1 tablet (25 mg total) by mouth every 8 (eight) hours. for SBP > 140mm HG    polyethylene glycol (GLYCOLAX) 17 gram/dose powder Use cap to measure 17 g, then  mix in liquid and drink by mouth once daily.    sevelamer carbonate (RENVELA) 800 mg Tab Take 800 mg by mouth 3 (three) times daily with meals.     Family History       Problem Relation (Age of Onset)    Cancer Maternal Grandfather    Cataracts Mother    Diabetes Mother    Heart disease Brother    Hypertension Mother, Father, Sister, Brother    Kidney disease Brother          Tobacco Use    Smoking status: Never    Smokeless tobacco: Never   Substance and Sexual Activity    Alcohol use: Not Currently     Comment: One drink a month    Drug use: No    Sexual activity: Yes     Partners: Female     Birth control/protection: None     Review of Systems   Constitutional: Negative for chills, fever and night sweats.   HENT:  Negative for congestion and hearing loss.    Eyes:  Negative for blurred vision, discharge, pain and visual disturbance.   Cardiovascular:  Positive for chest pain and dyspnea on exertion. Negative for leg swelling, near-syncope and palpitations.   Respiratory:  Negative for cough, hemoptysis, shortness of breath and wheezing.    Endocrine: Negative for cold intolerance and heat intolerance.   Skin:  Negative for flushing.   Musculoskeletal:  Negative for muscle weakness, myalgias, neck pain and stiffness.   Gastrointestinal:  Negative for abdominal pain, constipation, diarrhea, nausea and vomiting.   Genitourinary:  Negative for dysuria and hematuria.   Neurological:  Negative for focal weakness, headaches, numbness and paresthesias.   Psychiatric/Behavioral:  Negative for altered mental status and memory loss. The patient is not nervous/anxious.      Objective:     Vital Signs (Most Recent):  Temp: 98.6 °F (37 °C) (11/28/23 0753)  Pulse: 80 (11/28/23 0831)  Resp: 20 (11/28/23 0831)  BP: (!) 176/79 (11/28/23 0831)  SpO2: 96 % (11/28/23 0831) Vital Signs (24h Range):  Temp:  [97.9 °F (36.6 °C)-98.6 °F (37 °C)] 98.6 °F (37 °C)  Pulse:  [64-91] 80  Resp:  [16-20] 20  SpO2:  [93 %-99 %] 96 %  BP:  (169-194)/(76-93) 176/79     Weight: 79.3 kg (174 lb 13.2 oz)  Body mass index is 27.38 kg/m².    SpO2: 96 %         Intake/Output Summary (Last 24 hours) at 11/28/2023 1018  Last data filed at 11/27/2023 1730  Gross per 24 hour   Intake 300 ml   Output 2650 ml   Net -2350 ml       Lines/Drains/Airways       Peripheral Intravenous Line  Duration                  Hemodialysis AV Fistula Right forearm -- days         Peripheral IV - Single Lumen 11/27/23 0253 20 G Left Antecubital 1 day         Peripheral IV - Single Lumen 11/27/23 0629 20 G Anterior;Left Forearm 1 day                     Physical Exam  Vitals reviewed.   Constitutional:       Appearance: He is well-developed.   HENT:      Head: Normocephalic and atraumatic.   Eyes:      Conjunctiva/sclera: Conjunctivae normal.      Pupils: Pupils are equal, round, and reactive to light.   Neck:      Vascular: Normal carotid pulses. No carotid bruit or JVD.   Cardiovascular:      Rate and Rhythm: Normal rate and regular rhythm.      Heart sounds: No murmur heard.     No friction rub. No gallop.      Comments: 2+ radial. Right wrist fistula palpable thrill.  2+ femoral.  Tibial pulses not palpable.  Pulmonary:      Effort: Pulmonary effort is normal. No respiratory distress.   Abdominal:      General: There is no distension.      Palpations: Abdomen is soft.      Tenderness: There is no abdominal tenderness.   Musculoskeletal:      Cervical back: Normal range of motion and neck supple.   Skin:     General: Skin is warm and dry.      Nails: There is no clubbing.   Neurological:      Mental Status: He is alert and oriented to person, place, and time.   Psychiatric:         Behavior: Behavior normal.            Significant Labs: All pertinent lab results from the last 24 hours have been reviewed.

## 2023-11-28 NOTE — CONSULTS
[facility] - Observation [facility]H  Interventional Cardiology  Consult Note    Patient Name: Haroldo Dickinson  MRN: [id number]  Admission Date: 11/27/2023  Hospital Length of Stay: 0 days  Code Status: Full Code   Attending Provider: Apple Arredondo MD  Consulting Provider: Soledad Matos MD  Primary Care Physician: Tess Ledbetter MD  Principal Problem:NSTEMI (non-ST elevated myocardial infarction)    Patient information was obtained from patient, past medical records, and primary team.     Inpatient consult to Interventional Cardiology  Consult performed by: Soledad Matos MD  Consult ordered by: Pasquale Bui MD        Subjective:     Chief Complaint:  chest pain     HPI:  49 y/o M with CAD s/p PCI x2 HILLARY to RCA in 2019, ESRD on dialysis who presents with chest pain occurring at rest for the past week. Symptoms occur when laying down to sleep and persist throughout the night and on Sunday severity was too intense that he could not tolerate it. He has been having dyspnea on exertion for the past few weeks. He has no other complaints. Last [facility] 2021: mid RCA occluded, 90% LAD lesion.  EKG no ST changes  Troponin 0.1 => now 0.5  hgb 8.7      Past Medical History:   Diagnosis Date    CAD (coronary artery disease), native coronary artery 11/21/2019    Cataract     Diabetes mellitus     Diabetic retinopathy     Dialysis patient     DM type 2 causing renal disease, not at goal     ESRD (end stage renal disease) started dialysis 01/2014 06/05/2014    Hyperparathyroidism, secondary renal 06/05/2014    Hypertension     NSTEMI (non-ST elevated myocardial infarction) 12/21/2013    Organ transplant candidate 06/05/2014    Pneumonia     Renal cell carcinoma of right kidney     Renal hypertension     Stroke        Past Surgical History:   Procedure Laterality Date    ANGIOGRAM, CORONARY, WITH LEFT HEART CATHETERIZATION N/A 7/1/2021    Procedure: Angiogram, Coronary, with Left Heart Cath;  Surgeon: Miki Zendejas MD;   Location: [facility] CATH LAB;  Service: Cardiology;  Laterality: N/A;    COLONOSCOPY N/A 5/3/2017    Procedure: COLONOSCOPY;  Surgeon: Rufino Carpenter MD;  Location: [facility] (4TH FLR);  Service: Endoscopy;  Laterality: N/A;  pt states can only schedule on Wednesdays    COLONOSCOPY N/A 2/9/2021    Procedure: COLONOSCOPY;  Surgeon: Edil Wong MD;  Location: [facility] (4TH FLR);  Service: Endoscopy;  Laterality: N/A;  Dialysis MWF/ labwork day of procedure  right arm aceess  per Dr. Colin pt can hold Plavix 5 days prior see note- sm  COVID test on 2/6/21 at [facility] - sm    COLONOSCOPY N/A 2/10/2021    Procedure: COLONOSCOPY;  Surgeon: Robert Ayers MD;  Location: [facility] (4TH FLR);  Service: Endoscopy;  Laterality: N/A;  rescheduled due to poor bowel prep-BB  negative covid screening 2/6/21-BB  dialysis M-W-F-BB  okay to r/s for 2/9/21 and to hold Plavix per Dr. KIRAN Wong-BB  labs same day-BB    CORONARY STENT PLACEMENT N/A 7/25/2019    Procedure: INSERTION, STENT, CORONARY ARTERY;  Surgeon: Miki Zendejas MD;  Location: [facility] CATH LAB;  Service: Cardiology;  Laterality: N/A;    DIALYSIS FISTULA CREATION      FISTULOGRAM N/A 2/18/2019    Procedure: Fistulogram;  Surgeon: Yaneth De La Cruz MD;  Location: [facility] CATH LAB;  Service: Cardiology;  Laterality: N/A;    FISTULOGRAM Right 7/23/2019    Procedure: Fistulogram;  Surgeon: Oz Cordoba MD;  Location: [facility] CATH LAB;  Service: Cardiology;  Laterality: Right;    LAPAROSCOPIC ROBOT-ASSISTED SURGICAL REMOVAL OF KIDNEY USING DA JAIME XI Right 5/19/2022    Procedure: XI ROBOTIC NEPHRECTOMY;  Surgeon: Aidan Hendricks MD;  Location: [address];  Service: Urology;  Laterality: Right;  3hrs    LAPAROSCOPIC ROBOT-ASSISTED SURGICAL REMOVAL OF KIDNEY USING DA JAIME XI Left 1/5/2023    Procedure: XI ROBOTIC NEPHRECTOMY;  Surgeon: Aidan Hendricks MD;  Location: [address];  Service: Urology;  Laterality: Left;  4 hrs    LEFT HEART CATHETERIZATION Left  7/25/2019    Procedure: Left heart cath;  Surgeon: Miki Zendejas MD;  Location: [facility] CATH LAB;  Service: Cardiology;  Laterality: Left;    REPAIR  1/5/2023    Procedure: REPAIR; COLOTOMY;  Surgeon: Maikel Lamb MD;  Location: [facility] OR [address] [place] [address];  Service: General;;    RETINAL LASER PROCEDURE Bilateral 2018 or 2017    Dr. Rothman    VASCULAR SURGERY         Review of patient's allergies indicates:   Allergen Reactions    No known allergies        PTA Medications   Medication Sig    amLODIPine (NORVASC) 10 MG tablet Take 1 tablet (10 mg total) by mouth once daily.    aspirin (ECOTRIN) 81 MG EC tablet Take 1 tablet (81 mg total) by mouth once daily.    AURYXIA 210 mg iron Tab Take 420 mg by mouth 3 (three) times daily with meals. Take 1 with snacks    carvediloL (COREG) 25 MG tablet TAKE 1 TABLET BY MOUTH TWICE A DAY WITH FOOD    ciclopirox (PENLAC) 8 % Soln Apply topically nightly.    cinacalcet (SENSIPAR) 90 MG Tab Take 1 tablet by mouth once daily.    hydrALAZINE (APRESOLINE) 25 MG tablet Take 1 tablet (25 mg total) by mouth every 8 (eight) hours. for SBP > 140mm HG    polyethylene glycol (GLYCOLAX) 17 gram/dose powder Use cap to measure 17 g, then mix in liquid and drink by mouth once daily.    sevelamer carbonate (RENVELA) 800 mg Tab Take 800 mg by mouth 3 (three) times daily with meals.     Family History       Problem Relation (Age of Onset)    Cancer Maternal Grandfather    Cataracts Mother    Diabetes Mother    Heart disease Brother    Hypertension Mother, Father, Sister, Brother    Kidney disease Brother          Tobacco Use    Smoking status: Never    Smokeless tobacco: Never   Substance and Sexual Activity    Alcohol use: Not Currently     Comment: One drink a month    Drug use: No    Sexual activity: Yes     Partners: Female     Birth control/protection: None     Review of Systems   Constitutional: Negative for chills, fever and night sweats.   HENT:  Negative for congestion and hearing loss.     Eyes:  Negative for blurred vision, discharge, pain and visual disturbance.   Cardiovascular:  Positive for chest pain and dyspnea on exertion. Negative for leg swelling, near-syncope and palpitations.   Respiratory:  Negative for cough, hemoptysis, shortness of breath and wheezing.    Endocrine: Negative for cold intolerance and heat intolerance.   Skin:  Negative for flushing.   Musculoskeletal:  Negative for muscle weakness, myalgias, neck pain and stiffness.   Gastrointestinal:  Negative for abdominal pain, constipation, diarrhea, nausea and vomiting.   Genitourinary:  Negative for dysuria and hematuria.   Neurological:  Negative for focal weakness, headaches, numbness and paresthesias.   Psychiatric/Behavioral:  Negative for altered mental status and memory loss. The patient is not nervous/anxious.      Objective:     Vital Signs (Most Recent):  Temp: 98.6 °F (37 °C) (11/28/23 0753)  Pulse: 80 (11/28/23 0831)  Resp: 20 (11/28/23 0831)  BP: (!) 176/79 (11/28/23 0831)  SpO2: 96 % (11/28/23 0831) Vital Signs (24h Range):  Temp:  [97.9 °F (36.6 °C)-98.6 °F (37 °C)] 98.6 °F (37 °C)  Pulse:  [64-91] 80  Resp:  [16-20] 20  SpO2:  [93 %-99 %] 96 %  BP: (169-194)/(76-93) 176/79     Weight: 79.3 kg (174 lb 13.2 oz)  Body mass index is 27.38 kg/m².    SpO2: 96 %         Intake/Output Summary (Last 24 hours) at 11/28/2023 1018  Last data filed at 11/27/2023 1730  Gross per 24 hour   Intake 300 ml   Output 2650 ml   Net -2350 ml       Lines/Drains/Airways       Peripheral Intravenous Line  Duration                  Hemodialysis AV Fistula Right forearm -- days         Peripheral IV - Single Lumen 11/27/23 0253 20 G Left Antecubital 1 day         Peripheral IV - Single Lumen 11/27/23 0629 20 G Anterior;Left Forearm 1 day                     Physical Exam  Vitals reviewed.   Constitutional:       Appearance: He is well-developed.   HENT:      Head: Normocephalic and atraumatic.   Eyes:      Conjunctiva/sclera: Conjunctivae  normal.      Pupils: Pupils are equal, round, and reactive to light.   Neck:      Vascular: Normal carotid pulses. No carotid bruit or JVD.   Cardiovascular:      Rate and Rhythm: Normal rate and regular rhythm.      Heart sounds: No murmur heard.     No friction rub. No gallop.      Comments: 2+ radial. Right wrist fistula palpable thrill.  2+ femoral.  Tibial pulses not palpable.  Pulmonary:      Effort: Pulmonary effort is normal. No respiratory distress.   Abdominal:      General: There is no distension.      Palpations: Abdomen is soft.      Tenderness: There is no abdominal tenderness.   Musculoskeletal:      Cervical back: Normal range of motion and neck supple.   Skin:     General: Skin is warm and dry.      Nails: There is no clubbing.   Neurological:      Mental Status: He is alert and oriented to person, place, and time.   Psychiatric:         Behavior: Behavior normal.            Significant Labs: All pertinent lab results from the last 24 hours have been reviewed.      Assessment and Plan:     Cardiac/Vascular  * NSTEMI (non-ST elevated myocardial infarction)  Chest pain at rest, troponin trended up from 0.1 to 0.5  -C +/- PCI, patient is a HILLARY candidate  - Anti-platelet Therapy: ASA + plavix  - Access: Right femoral  - Creatinine/CrCl: dialysis  - Allergies: no Iodine allergy  - Pre-Hydration: NS  - Pre-Op Med: Bendaryl 50mg pO   - All patient's questions were answered.  -The risks, benefits and alternatives of the procedure were explained to the patient.   -The risks of coronary angiography include but are not limited to: bleeding, infection, heart rhythm abnormalities, allergic reactions, kidney injury and potential need for dialysis, stroke and death.   - Should stenting be indicated, the patient has agreed to dual anti-platelet therapy for 1-consecutive year with a drug-eluting stent and a minimum of 1-month with the use of a bare metal stent  - Additionally, pt is aware that non-compliance is  likely to result in stent clotting with heart attack, heart failure, and/or death  -The risks of moderate sedation include hypotension, respiratory depression, arrhythmias, bronchospasm, and death.   - Informed consent was obtained and the  patient is agreeable to proceed with the procedure.          VTE Risk Mitigation (From admission, onward)           Ordered     heparin 25,000 units in dextrose 5% (100 units/ml) IV bolus from bag - ADDITIONAL PRN BOLUS - 60 units/kg (max bolus 4000 units)  As needed (PRN)        Question:  Heparin Infusion Adjustment (DO NOT MODIFY ANSWER)  Answer:  [email or web address]\Images\Pharmacy\HeparinInfusions\heparin LOW INTENSITY nomogram for [facility] [id number]    11/27/23 0524     heparin 25,000 units in dextrose 5% (100 units/ml) IV bolus from bag - ADDITIONAL PRN BOLUS - 30 units/kg (max bolus 4000 units)  As needed (PRN)        Question:  Heparin Infusion Adjustment (DO NOT MODIFY ANSWER)  Answer:  \[email or web address]\Images\Pharmacy\HeparinInfusions\heparin LOW INTENSITY nomogram for [facility] [id number].pdf    11/27/23 0524     heparin 25,000 units in dextrose 5% 250 mL (100 units/mL) infusion LOW INTENSITY nomogram - [facility]  Continuous        Question:  Begin at (units/kg/hr)  Answer:  12    11/27/23 0524     IP VTE HIGH RISK PATIENT  Once         11/27/23 0450     Place sequential compression device  Until discontinued         11/27/23 0450                    Thank you for your consult. I will follow-up with patient. Please contact us if you have any additional questions.    Soledad Matos MD  Interventional Cardiology   [facility] - Observation 11H

## 2023-11-28 NOTE — PROGRESS NOTES
Dialysis completed. Needles removed from RFA fistula with manual pressure held to site for 7 minutes each with hemostasis achieved. Gauze dressing applied. Dialyzed for 3.5 hours with fluid removal of 2 liters. Tolerated well with no complaints of chest pain or discomfort. Report to Cher, primary care nurse. Returned to his room by wheelchair.

## 2023-11-28 NOTE — HPI
49 y/o M with CAD s/p PCI x2 HILLARY to RCA in 2019, ESRD on dialysis who presents with chest pain occurring at rest for the past week. Symptoms occur when laying down to sleep and persist throughout the night and on Sunday severity was too intense that he could not tolerate it. He has been having dyspnea on exertion for the past few weeks. He has no other complaints. Last [facility] 2021: mid RCA occluded, 90% LAD lesion.  EKG no ST changes  Troponin 0.1 => now 0.5  hgb 8.7

## 2023-11-28 NOTE — ASSESSMENT & PLAN NOTE
- patient's T2DM resolved following his BL nephrectomy and is currently diet controlled per the patient. No insulin ordered at this time  - last A1C reviewed  - renally dose meds  - avoid nephrotoxic medications and procedures

## 2023-11-28 NOTE — PLAN OF CARE
Problem: Adult Inpatient Plan of Care  Goal: Plan of Care Review  Outcome: Ongoing, Progressing  Goal: Patient-Specific Goal (Individualized)  Outcome: Ongoing, Progressing  Goal: Absence of Hospital-Acquired Illness or Injury  Outcome: Ongoing, Progressing  Goal: Optimal Comfort and Wellbeing  Outcome: Ongoing, Progressing  Goal: Readiness for Transition of Care  Outcome: Ongoing, Progressing     Problem: Diabetes Comorbidity  Goal: Blood Glucose Level Within Targeted Range  Outcome: Ongoing, Progressing     Problem: Fall Injury Risk  Goal: Absence of Fall and Fall-Related Injury  Outcome: Ongoing, Progressing     Problem: Device-Related Complication Risk (Hemodialysis)  Goal: Safe, Effective Therapy Delivery  Outcome: Ongoing, Progressing     Problem: Hemodynamic Instability (Hemodialysis)  Goal: Effective Tissue Perfusion  Outcome: Ongoing, Progressing     Problem: Infection (Hemodialysis)  Goal: Absence of Infection Signs and Symptoms  Outcome: Ongoing, Progressing     Problem: Electrolyte Imbalance (Chronic Kidney Disease)  Goal: Electrolyte Balance  Outcome: Ongoing, Progressing     Problem: Fluid Volume Excess (Chronic Kidney Disease)  Goal: Fluid Balance  Outcome: Ongoing, Progressing

## 2023-11-28 NOTE — ASSESSMENT & PLAN NOTE
Patient is a 48-year-old male with past medical history significant for bilateral RCC status post nephrectomy, ESRD on HD Monday Wednesday Friday presenting with a 5 day history of waxing/waning chest.     The patient states that they (+) have chest pain. It has sudden onset. It (--) associated with exertion. It (--) relieved by rest. It (+) relieved by nitroglycerin.  There is no radiation.  It is associated with shortness of breath and is not affected by position although appears to be more common nocturnally.  Patient states that he sleeps on 2 pillows but overall has no trouble sleeping flat.  He is currently not hypoxic on room air and resting comfortably prior to me entering the room.    Patient's history is consistent with atypical cardiac chest pain however lack of trop rise suggestive of UA rather than NSTEMI.  He has a ALONDRA score of 4.     EKG shows some nonspecific ST depression in 2,3 and AVF but appears to be stable since may. Peaked T waves also noted in precordial leads.    Plan:  - 81 mg ASA in AM  - load with 325 mg ASA   - Previously told to stop clopidogrel but does not know why  - cardiology consult for UA eval  - Heparin gtt for minimum 48 hours  - continue carvedilol 25 mg b.i.d.; target HR approx 60  - GTN PRN for chest pain   - RBC transfusion if Hgb < 8 g/dL  - 11/28 - unable to schedule PET >> Interventional Cards to proceed with [facility]   -- cont heparin, plavix load, cont ASA

## 2023-11-28 NOTE — ASSESSMENT & PLAN NOTE
Creatine stable for now. BMP reviewed- noted Estimated Creatinine Clearance: 9.2 mL/min (A) (based on SCr of 9.4 mg/dL (H)). according to latest data. Based on current GFR, CKD stage is end stage.  Monitor UOP and serial BMP and adjust therapy as needed. Renally dose meds. Avoid nephrotoxic medications and procedures.      Nephrology consulted for HD needs.

## 2023-11-28 NOTE — PLAN OF CARE
Received report from PORTIA Billingsley. Patient s/p [facility], AAOx3. VSS, no c/o pain or discomfort at this time, resp even and unlabored. Gauze/tegaderm dressing to right groin is CDI. No active bleeding. No hematoma noted. Post procedure protocol reviewed with patient. Understanding verbalized. Nurse call bell within reach.

## 2023-11-28 NOTE — ASSESSMENT & PLAN NOTE
- hyperkalemic on admission, peaked T waves on EKG on admission  - shifted with potassium binders, calcium gluconate, IV insulin and dextrose  - HD yesterday  - K improving

## 2023-11-28 NOTE — ASSESSMENT & PLAN NOTE
Chest pain at rest, troponin trended up from 0.1 to 0.5  -[facility] +/- PCI, patient is a HILLARY candidate  - Anti-platelet Therapy: ASA + plavix  - Access: Right femoral  - Creatinine/CrCl: dialysis  - Allergies: no Iodine allergy  - Pre-Hydration: NS  - Pre-Op Med: Bendaryl 50mg pO   - All patient's questions were answered.  -The risks, benefits and alternatives of the procedure were explained to the patient.   -The risks of coronary angiography include but are not limited to: bleeding, infection, heart rhythm abnormalities, allergic reactions, kidney injury and potential need for dialysis, stroke and death.   - Should stenting be indicated, the patient has agreed to dual anti-platelet therapy for 1-consecutive year with a drug-eluting stent and a minimum of 1-month with the use of a bare metal stent  - Additionally, pt is aware that non-compliance is likely to result in stent clotting with heart attack, heart failure, and/or death  -The risks of moderate sedation include hypotension, respiratory depression, arrhythmias, bronchospasm, and death.   - Informed consent was obtained and the  patient is agreeable to proceed with the procedure.

## 2023-11-28 NOTE — SUBJECTIVE & OBJECTIVE
Interval History: NAEON, hypertensive, otherwise VSSAF. Unable to schedule PET today, will proceed with [facility]. Plavix load, ASA, continue Heparin gtt. IC following.      Review of Systems   Constitutional:  Negative for chills, fatigue and fever.   HENT:  Negative for sinus pressure and sinus pain.    Eyes:  Negative for visual disturbance.   Respiratory:  Negative for cough, chest tightness and shortness of breath.    Cardiovascular:  Negative for chest pain, palpitations and leg swelling.   Gastrointestinal:  Negative for abdominal distention, abdominal pain, constipation, diarrhea, nausea and vomiting.   Musculoskeletal:  Negative for back pain.   Skin:  Negative for rash.   Neurological:  Negative for light-headedness and headaches.   Hematological:  Does not bruise/bleed easily.     Objective:     Vital Signs (Most Recent):  Temp: 98.6 °F (37 °C) (11/28/23 0753)  Pulse: 80 (11/28/23 0831)  Resp: 20 (11/28/23 0831)  BP: (!) 176/79 (11/28/23 0831)  SpO2: 96 % (11/28/23 0831) Vital Signs (24h Range):  Temp:  [97.9 °F (36.6 °C)-98.6 °F (37 °C)] 98.6 °F (37 °C)  Pulse:  [64-91] 80  Resp:  [16-20] 20  SpO2:  [93 %-99 %] 96 %  BP: (169-194)/(76-93) 176/79     Weight: 79.3 kg (174 lb 13.2 oz)  Body mass index is 27.38 kg/m².    Intake/Output Summary (Last 24 hours) at 11/28/2023 1036  Last data filed at 11/27/2023 1730  Gross per 24 hour   Intake 300 ml   Output 2650 ml   Net -2350 ml         Physical Exam  Vitals and nursing note reviewed.   Constitutional:       General: He is not in acute distress.     Appearance: Normal appearance. He is not ill-appearing.   HENT:      Head: Normocephalic and atraumatic.   Eyes:      General: No scleral icterus.     Pupils: Pupils are equal, round, and reactive to light.   Cardiovascular:      Rate and Rhythm: Normal rate and regular rhythm.      Pulses: Normal pulses.      Heart sounds: Normal heart sounds.      Comments: R forearm AVF  Pulmonary:      Effort: Pulmonary effort is  normal. No respiratory distress.      Breath sounds: Normal breath sounds.   Abdominal:      General: Abdomen is flat. There is no distension.      Palpations: Abdomen is soft.      Tenderness: There is no abdominal tenderness.   Musculoskeletal:      Right lower leg: No edema.      Left lower leg: No edema.   Lymphadenopathy:      Cervical: No cervical adenopathy.   Skin:     General: Skin is warm and dry.      Capillary Refill: Capillary refill takes less than 2 seconds.   Neurological:      General: No focal deficit present.      Mental Status: He is alert and oriented to person, place, and time.             Significant Labs: All pertinent labs within the past 24 hours have been reviewed.  CBC:   Recent Labs   Lab 11/27/23  0249 11/27/23  0529 11/28/23  0304   WBC 10.17 9.59 11.49  11.49   HGB 8.6* 8.6* 8.7*  8.7*   HCT 27.5* 28.2* 27.4*  27.4*    296 324  324     CMP:   Recent Labs   Lab 11/27/23  0340 11/27/23  0822 11/27/23  1305 11/27/23  1914 11/28/23  0304      < > 141 137 138   K 5.7*   < > 5.3* 3.8 4.1   *   < > 110 100 101   CO2 16*   < > 18* 24 23      < > 80 117* 78   *   < > 104* 44* 54*   CREATININE 14.0*   < > 14.8* 7.7* 9.4*   CALCIUM 8.4*   < > 8.3* 8.1* 7.6*   PROT 7.1  --   --   --  6.9   ALBUMIN 3.2*  --   --   --  3.0*   BILITOT 0.7  --   --   --  0.8   ALKPHOS 78  --   --   --  76   AST 11  --   --   --  9*   ALT 15  --   --   --  12   ANIONGAP 17*   < > 13 13 14    < > = values in this interval not displayed.       Significant Imaging: I have reviewed all pertinent imaging results/findings within the past 24 hours.

## 2023-11-28 NOTE — ASSESSMENT & PLAN NOTE
Patient with known CAD s/p stent placement, which is uncontrolled Will continue ASA and Statin and monitor for S/Sx of angina/ACS. Continue to monitor on telemetry.   - last Echo reviewed  - known stent placement  - see NSTEMI

## 2023-11-28 NOTE — PLAN OF CARE
[facility]  Initial Discharge Assessment       Primary Care Provider: Tess Ledbetter MD    Admission Diagnosis: Hyperkalemia [E87.5]  Uremia [N19]  Chest pain [R07.9]  ESRD on hemodialysis [N18.6, Z99.2]  Anemia, unspecified type [D64.9]    Admission Date: 11/27/2023  Expected Discharge Date: 11/29/2023    Transition of Care Barriers: (P) None    Payor: [employer] MEDICARE / Plan: [employer] HMO PPO SPECIAL NEEDS / Product Type: Medicare Advantage /     Extended Emergency Contact Information  Primary Emergency Contact: Gabby Dickinson  Address: [address] [place] [address] [id number] [place] [address]  Mobile Phone: [phone]  Relation: Mother    Discharge Plan A: (P) Home  Discharge Plan B: (P) Home      [id number] [place], [place] [facility] [address]  [address] [facility] [address]  Phone: [phone] Fax: [phone]    [id number] [place], [place] - [address] [facility]  [address] [facility] [address]  Phone: [phone] Fax: [phone]      Initial Assessment (most recent)       Adult Discharge Assessment - 11/28/23 0937          Discharge Assessment    Assessment Type Discharge Planning Assessment (P)      Confirmed/corrected address, phone number and insurance Yes (P)      Confirmed Demographics Correct on Facesheet (P)      Source of Information patient (P)      When was your last doctors appointment? -- (P)    Patient states that Dr. Badillo ? is his PCP. Patient could not remember how to spell his last name or the office number but states that he saw him 2 weeks ago.    Does patient/caregiver understand observation status Yes (P)      Communicated QUIN with patient/caregiver Date not available/Unable to determine (P)      Reason For Admission chest pain (P)      People in Home alone (P)      Facility Arrived From: home (P)      Do you expect to return to your current living situation? Yes (P)      Do you  have help at home or someone to help you manage your care at home? Yes (P)      Who are your caregiver(s) and their phone number(s)? Mother Gabby Dickinson- [phone] (P)      Prior to hospitilization cognitive status: Alert/Oriented (P)      Current cognitive status: Alert/Oriented (P)      Walking or Climbing Stairs ambulation difficulty, requires equipment (P)      Mobility Management rollator (P)      Dressing/Bathing -- (P)    independent    Home Layout Able to live on 1st floor (P)      Equipment Currently Used at Home rollator (P)      Readmission within 30 days? No (P)      Patient currently being followed by outpatient case management? No (P)      Do you currently have service(s) that help you manage your care at home? No (P)      Do you take prescription medications? Yes (P)      Do you have prescription coverage? Yes (P)      Coverage [employer] & medicaid (P)      Do you have any problems affording any of your prescribed medications? No (P)      Is the patient taking medications as prescribed? yes (P)      Who is going to help you get home at discharge? Mother Gabby Dickinson (P)      How do you get to doctors appointments? health plan transportation;public transportation (P)      Are you on dialysis? Yes (P)      Dialysis Name and Scheduled days M[place] [facility] (P)      Do you take coumadin? No (P)      DME Needed Upon Discharge  none (P)      Discharge Plan discussed with: Patient (P)      Transition of Care Barriers None (P)      Discharge Plan A Home (P)      Discharge Plan B Home (P)         Financial Resource Strain    How hard is it for you to pay for the very basics like food, housing, medical care, and heating? Not hard at all (P)         Housing Stability    In the last 12 months, was there a time when you were not able to pay the mortgage or rent on time? No (P)      In the last 12 months, was there a time when you did not have a steady place to sleep or slept in a shelter (including now)?  No (P)         Transportation Needs    In the past 12 months, has lack of transportation kept you from medical appointments or from getting medications? No (P)      In the past 12 months, has lack of transportation kept you from meetings, work, or from getting things needed for daily living? No (P)         Food Insecurity    Within the past 12 months, you worried that your food would run out before you got the money to buy more. Never true (P)      Within the past 12 months, the food you bought just didn't last and you didn't have money to get more. Never true (P)         Social Connections    Are you , , , , never , or living with a partner? Never  (P)                                   Patient states he lives alone in an apartment on the first floor. Patient plan to go home at time of d/c. Patient goes to  on MWF. Patient states that he use transportation. Patient denies services int he home. CM asked if he need assistance at home after hospitalization, who will assist him. Patient states his mother, Gabby Dickinson. Patient states that his mother will transport him home at time of discharge.   Patient didn't have much information about his PCP. Patient did say he does not go to Dr. MONAE.     Discharge Plan A and Plan B have been determined by review of patient's clinical status, future medical and therapeutic needs, and coverage/benefits for post-acute care in coordination with multidisciplinary team members. Abigail Krishna RN

## 2023-11-28 NOTE — NURSING TRANSFER
Nursing Transfer Note      11/28/2023   3:23 PM    Nurse giving handoff:PORTIA Cole   Nurse receiving handoff: PORTIA Del Toro    Reason patient is being transferred: back to inpatient bed    Transfer To: [address]    Transfer via stretcher    Transfer with cardiac monitoring    Transported by transport tech    Transfer Vital Signs:  Blood Pressure:163/88  Heart Rate:73  O2:95  Temperature:97.5  Respirations:18    Order for Tele Monitor? Yes      Any special needs or follow-up needed: check right groin site    Patient belongings transferred with patient: Yes    Chart send with patient: Yes    Notified: mother

## 2023-11-28 NOTE — ASSESSMENT & PLAN NOTE
Hypertensive emergency  - uncontrolled on admission  - home regimen - amlodipine 10mg daily, coreg 25mg bid, and hydralazine 25mg q8h for SBP > 140  - during admission   -- switch amlodipine >> nifedipine 60mg daily    -- hydralazine increased to 50mg q8h   -- cont coreg 25mg bid  - prn hydralazine for SBP > 180

## 2023-11-28 NOTE — PROCEDURES
Brief Operative Note:    : Noah Pendleton MD     Referring Physician: Self,Aaarefany     All Operators: Surgeon(s):  Felix Ngo MD Tafur Soto, Jose D., MD     Preoperative Diagnosis: NSTEMI (non-ST elevated myocardial infarction) [I21.4]     Postop Diagnosis: NSTEMI (non-ST elevated myocardial infarction) [I21.4]    Treatments/Procedures: Procedure(s) (LRB):  Angiogram, Coronary, with Left Heart Cath (N/A)    Access: Right CFA    Findings: RCA , mid and distal LAD severe lesion   See catheterization report for full details.    Intervention: none     See catheterization report for full details.    Closure device: Perclose       Plan:  - Post cath protocol     - Bed rest x 4 hours   - Continue aspirin 81 mg daily indefinitely  - Continue Plavix 75 mg PO daily for one year ( NSTEMI )  - RCA  and distal LAD lesion are known, recommend medical therapy   - Continue high intensity statin therapy (LDL goal < 70)  - Risk factor reduction (BP <130/80 mmHg, glycemic control, etc)  - Cardiac rehab referral  - Follow up with outpatient cardiologist    Estimated Blood loss: 20 cc    Specimens removed: No    Felix Ngo MD

## 2023-11-29 ENCOUNTER — TELEPHONE (OUTPATIENT)
Dept: CARDIAC REHAB | Facility: CLINIC | Age: 48
End: 2023-11-29
Payer: MEDICARE

## 2023-11-29 VITALS
SYSTOLIC BLOOD PRESSURE: 141 MMHG | HEIGHT: 67 IN | HEART RATE: 92 BPM | DIASTOLIC BLOOD PRESSURE: 79 MMHG | WEIGHT: 169.75 LBS | RESPIRATION RATE: 18 BRPM | TEMPERATURE: 98 F | BODY MASS INDEX: 26.64 KG/M2 | OXYGEN SATURATION: 96 %

## 2023-11-29 DIAGNOSIS — I21.4 NON-ST ELEVATION MYOCARDIAL INFARCTION (NSTEMI): Primary | ICD-10-CM

## 2023-11-29 LAB
ALBUMIN SERPL BCP-MCNC: 2.8 G/DL (ref 3.5–5.2)
ALP SERPL-CCNC: 73 U/L (ref 55–135)
ALT SERPL W/O P-5'-P-CCNC: 9 U/L (ref 10–44)
ANION GAP SERPL CALC-SCNC: 18 MMOL/L (ref 8–16)
APTT PPP: 44 SEC (ref 21–32)
APTT PPP: 51 SEC (ref 21–32)
AST SERPL-CCNC: 10 U/L (ref 10–40)
BASOPHILS # BLD AUTO: 0.1 K/UL (ref 0–0.2)
BASOPHILS # BLD AUTO: 0.1 K/UL (ref 0–0.2)
BASOPHILS NFR BLD: 0.8 % (ref 0–1.9)
BASOPHILS NFR BLD: 0.8 % (ref 0–1.9)
BILIRUB SERPL-MCNC: 0.6 MG/DL (ref 0.1–1)
BUN SERPL-MCNC: 69 MG/DL (ref 6–20)
CALCIUM SERPL-MCNC: 7.3 MG/DL (ref 8.7–10.5)
CHLORIDE SERPL-SCNC: 99 MMOL/L (ref 95–110)
CO2 SERPL-SCNC: 19 MMOL/L (ref 23–29)
CREAT SERPL-MCNC: 12.4 MG/DL (ref 0.5–1.4)
DIFFERENTIAL METHOD: ABNORMAL
DIFFERENTIAL METHOD: ABNORMAL
EOSINOPHIL # BLD AUTO: 0.4 K/UL (ref 0–0.5)
EOSINOPHIL # BLD AUTO: 0.4 K/UL (ref 0–0.5)
EOSINOPHIL NFR BLD: 3.1 % (ref 0–8)
EOSINOPHIL NFR BLD: 3.1 % (ref 0–8)
ERYTHROCYTE [DISTWIDTH] IN BLOOD BY AUTOMATED COUNT: 14.4 % (ref 11.5–14.5)
ERYTHROCYTE [DISTWIDTH] IN BLOOD BY AUTOMATED COUNT: 14.4 % (ref 11.5–14.5)
EST. GFR  (NO RACE VARIABLE): 4.6 ML/MIN/1.73 M^2
GLUCOSE SERPL-MCNC: 74 MG/DL (ref 70–110)
HCT VFR BLD AUTO: 25 % (ref 40–54)
HCT VFR BLD AUTO: 25 % (ref 40–54)
HGB BLD-MCNC: 8.2 G/DL (ref 14–18)
HGB BLD-MCNC: 8.2 G/DL (ref 14–18)
IMM GRANULOCYTES # BLD AUTO: 0.08 K/UL (ref 0–0.04)
IMM GRANULOCYTES # BLD AUTO: 0.08 K/UL (ref 0–0.04)
IMM GRANULOCYTES NFR BLD AUTO: 0.6 % (ref 0–0.5)
IMM GRANULOCYTES NFR BLD AUTO: 0.6 % (ref 0–0.5)
LYMPHOCYTES # BLD AUTO: 1.8 K/UL (ref 1–4.8)
LYMPHOCYTES # BLD AUTO: 1.8 K/UL (ref 1–4.8)
LYMPHOCYTES NFR BLD: 14.1 % (ref 18–48)
LYMPHOCYTES NFR BLD: 14.1 % (ref 18–48)
MAGNESIUM SERPL-MCNC: 1.7 MG/DL (ref 1.6–2.6)
MCH RBC QN AUTO: 29.9 PG (ref 27–31)
MCH RBC QN AUTO: 29.9 PG (ref 27–31)
MCHC RBC AUTO-ENTMCNC: 32.8 G/DL (ref 32–36)
MCHC RBC AUTO-ENTMCNC: 32.8 G/DL (ref 32–36)
MCV RBC AUTO: 91 FL (ref 82–98)
MCV RBC AUTO: 91 FL (ref 82–98)
MONOCYTES # BLD AUTO: 1.3 K/UL (ref 0.3–1)
MONOCYTES # BLD AUTO: 1.3 K/UL (ref 0.3–1)
MONOCYTES NFR BLD: 10.2 % (ref 4–15)
MONOCYTES NFR BLD: 10.2 % (ref 4–15)
NEUTROPHILS # BLD AUTO: 9 K/UL (ref 1.8–7.7)
NEUTROPHILS # BLD AUTO: 9 K/UL (ref 1.8–7.7)
NEUTROPHILS NFR BLD: 71.2 % (ref 38–73)
NEUTROPHILS NFR BLD: 71.2 % (ref 38–73)
NRBC BLD-RTO: 0 /100 WBC
NRBC BLD-RTO: 0 /100 WBC
PHOSPHATE SERPL-MCNC: 5.4 MG/DL (ref 2.7–4.5)
PLATELET # BLD AUTO: 343 K/UL (ref 150–450)
PLATELET # BLD AUTO: 343 K/UL (ref 150–450)
PMV BLD AUTO: 9.9 FL (ref 9.2–12.9)
PMV BLD AUTO: 9.9 FL (ref 9.2–12.9)
POCT GLUCOSE: 80 MG/DL (ref 70–110)
POCT GLUCOSE: 81 MG/DL (ref 70–110)
POTASSIUM SERPL-SCNC: 4.6 MMOL/L (ref 3.5–5.1)
PROT SERPL-MCNC: 6.5 G/DL (ref 6–8.4)
RBC # BLD AUTO: 2.74 M/UL (ref 4.6–6.2)
RBC # BLD AUTO: 2.74 M/UL (ref 4.6–6.2)
SODIUM SERPL-SCNC: 136 MMOL/L (ref 136–145)
WBC # BLD AUTO: 12.59 K/UL (ref 3.9–12.7)
WBC # BLD AUTO: 12.59 K/UL (ref 3.9–12.7)

## 2023-11-29 PROCEDURE — 90935 PR HEMODIALYSIS, ONE EVALUATION: ICD-10-PCS | Mod: ,,, | Performed by: NURSE PRACTITIONER

## 2023-11-29 PROCEDURE — 63600175 PHARM REV CODE 636 W HCPCS: Performed by: STUDENT IN AN ORGANIZED HEALTH CARE EDUCATION/TRAINING PROGRAM

## 2023-11-29 PROCEDURE — 90935 HEMODIALYSIS ONE EVALUATION: CPT | Mod: ,,, | Performed by: NURSE PRACTITIONER

## 2023-11-29 PROCEDURE — 36415 COLL VENOUS BLD VENIPUNCTURE: CPT | Performed by: HOSPITALIST

## 2023-11-29 PROCEDURE — 85025 COMPLETE CBC W/AUTO DIFF WBC: CPT | Performed by: STUDENT IN AN ORGANIZED HEALTH CARE EDUCATION/TRAINING PROGRAM

## 2023-11-29 PROCEDURE — 85730 THROMBOPLASTIN TIME PARTIAL: CPT | Mod: 91 | Performed by: HOSPITALIST

## 2023-11-29 PROCEDURE — 83735 ASSAY OF MAGNESIUM: CPT | Performed by: STUDENT IN AN ORGANIZED HEALTH CARE EDUCATION/TRAINING PROGRAM

## 2023-11-29 PROCEDURE — 80100016 HC MAINTENANCE HEMODIALYSIS

## 2023-11-29 PROCEDURE — 85730 THROMBOPLASTIN TIME PARTIAL: CPT | Performed by: HOSPITALIST

## 2023-11-29 PROCEDURE — 36415 COLL VENOUS BLD VENIPUNCTURE: CPT | Performed by: STUDENT IN AN ORGANIZED HEALTH CARE EDUCATION/TRAINING PROGRAM

## 2023-11-29 PROCEDURE — 84100 ASSAY OF PHOSPHORUS: CPT | Performed by: STUDENT IN AN ORGANIZED HEALTH CARE EDUCATION/TRAINING PROGRAM

## 2023-11-29 PROCEDURE — 80053 COMPREHEN METABOLIC PANEL: CPT | Performed by: STUDENT IN AN ORGANIZED HEALTH CARE EDUCATION/TRAINING PROGRAM

## 2023-11-29 PROCEDURE — 25000003 PHARM REV CODE 250: Performed by: STUDENT IN AN ORGANIZED HEALTH CARE EDUCATION/TRAINING PROGRAM

## 2023-11-29 PROCEDURE — 63600175 PHARM REV CODE 636 W HCPCS

## 2023-11-29 PROCEDURE — 25000003 PHARM REV CODE 250

## 2023-11-29 RX ORDER — CLOPIDOGREL BISULFATE 75 MG/1
75 TABLET ORAL DAILY
Status: DISCONTINUED | OUTPATIENT
Start: 2023-11-29 | End: 2023-11-29 | Stop reason: HOSPADM

## 2023-11-29 RX ORDER — ATORVASTATIN CALCIUM 80 MG/1
80 TABLET, FILM COATED ORAL DAILY
Qty: 90 TABLET | Refills: 3 | Status: SHIPPED | OUTPATIENT
Start: 2023-11-30 | End: 2024-11-29

## 2023-11-29 RX ORDER — ASPIRIN 81 MG/1
81 TABLET ORAL DAILY
Qty: 30 TABLET | Refills: 11 | Status: SHIPPED | OUTPATIENT
Start: 2023-11-29 | End: 2024-11-28

## 2023-11-29 RX ORDER — ATORVASTATIN CALCIUM 40 MG/1
80 TABLET, FILM COATED ORAL DAILY
Status: DISCONTINUED | OUTPATIENT
Start: 2023-11-29 | End: 2023-11-29 | Stop reason: HOSPADM

## 2023-11-29 RX ORDER — HYDRALAZINE HYDROCHLORIDE 20 MG/ML
10 INJECTION INTRAMUSCULAR; INTRAVENOUS ONCE
Status: DISCONTINUED | OUTPATIENT
Start: 2023-11-29 | End: 2023-11-29

## 2023-11-29 RX ORDER — NIFEDIPINE 60 MG/1
60 TABLET, EXTENDED RELEASE ORAL DAILY
Qty: 90 TABLET | Refills: 3 | Status: ON HOLD | OUTPATIENT
Start: 2023-11-30 | End: 2024-03-13 | Stop reason: HOSPADM

## 2023-11-29 RX ORDER — CARVEDILOL 25 MG/1
50 TABLET ORAL 2 TIMES DAILY WITH MEALS
Qty: 360 TABLET | Refills: 3 | Status: SHIPPED | OUTPATIENT
Start: 2023-11-29 | End: 2024-11-23

## 2023-11-29 RX ORDER — CLOPIDOGREL BISULFATE 75 MG/1
75 TABLET ORAL DAILY
Qty: 90 TABLET | Refills: 3 | Status: SHIPPED | OUTPATIENT
Start: 2023-11-30 | End: 2024-11-29

## 2023-11-29 RX ORDER — HYDRALAZINE HYDROCHLORIDE 50 MG/1
50 TABLET, FILM COATED ORAL EVERY 8 HOURS
Qty: 270 TABLET | Refills: 3 | Status: SHIPPED | OUTPATIENT
Start: 2023-11-29 | End: 2023-12-12 | Stop reason: SDUPTHER

## 2023-11-29 RX ADMIN — NIFEDIPINE 60 MG: 30 TABLET, FILM COATED, EXTENDED RELEASE ORAL at 08:11

## 2023-11-29 RX ADMIN — HYDRALAZINE HYDROCHLORIDE 50 MG: 50 TABLET ORAL at 01:11

## 2023-11-29 RX ADMIN — HEPARIN SODIUM 19 UNITS/KG/HR: 10000 INJECTION, SOLUTION INTRAVENOUS at 12:11

## 2023-11-29 RX ADMIN — SEVELAMER CARBONATE 800 MG: 800 TABLET, FILM COATED ORAL at 08:11

## 2023-11-29 RX ADMIN — ASPIRIN 81 MG CHEWABLE TABLET 81 MG: 81 TABLET CHEWABLE at 08:11

## 2023-11-29 RX ADMIN — HYDRALAZINE HYDROCHLORIDE 10 MG: 20 INJECTION, SOLUTION INTRAMUSCULAR; INTRAVENOUS at 04:11

## 2023-11-29 RX ADMIN — ATORVASTATIN CALCIUM 80 MG: 40 TABLET, FILM COATED ORAL at 08:11

## 2023-11-29 RX ADMIN — CLOPIDOGREL BISULFATE 75 MG: 75 TABLET ORAL at 08:11

## 2023-11-29 RX ADMIN — POLYETHYLENE GLYCOL 3350 17 G: 17 POWDER, FOR SOLUTION ORAL at 08:11

## 2023-11-29 RX ADMIN — HYDRALAZINE HYDROCHLORIDE 50 MG: 50 TABLET ORAL at 05:11

## 2023-11-29 RX ADMIN — SEVELAMER CARBONATE 800 MG: 800 TABLET, FILM COATED ORAL at 01:11

## 2023-11-29 RX ADMIN — CINACALCET 90 MG: 30 TABLET, FILM COATED ORAL at 08:11

## 2023-11-29 RX ADMIN — CARVEDILOL 25 MG: 25 TABLET, FILM COATED ORAL at 08:11

## 2023-11-29 NOTE — PLAN OF CARE
[facility]  Discharge Final Note    Primary Care Provider: No, Primary Doctor    Expected Discharge Date: 11/29/2023    Patient discharged to home after dialysis treatment. Family/friend will provide  transportation.     Discharge Plan A and Plan B have been determined by review of patient's clinical status, future medical and therapeutic needs, and coverage/benefits for post-acute care in coordination with multidisciplinary team members.        Final Discharge Note (most recent)       Final Note - 11/29/23 1613          Final Note    Assessment Type Final Discharge Note (P)      Anticipated Discharge Disposition Home or Self Care (P)                      Important Message from Medicare  Important Message from Medicare regarding Discharge Appeal Rights: Given to patient/caregiver, Explained to patient/caregiver     Date IMM was signed: 11/29/23  Time IMM was signed: 0929        Future Appointments   Date Time Provider Department Center   12/6/2023  2:00 PM Mireya Larose MD [facility] [place]   12/7/2023  9:00 AM Luke Gonzalez Jr., MD [facility] CARDIO [place]   1/2/2024 10:30 AM Aisha Olsen DPM [facility] POD [place]   2/8/2024 10:45 AM [facility]   2/8/2024 11:00 AM [facility]   2/15/2024 11:00 AM Catrina Zendejas, NP [facility] UROLOG [facility] schedule follow up appointments. Abigail Krishna RN

## 2023-11-29 NOTE — PLAN OF CARE
CHW scheduled the [facility] hospital follow up for December 6 @ 2:00pm    CHW scheduled the Cardiology hospital follow up for December 7 @ 9:00am

## 2023-11-29 NOTE — TELEPHONE ENCOUNTER
"Information packet sent to patient, which includes "Your guide to living with heart disease". Letter was also sent to patient.   "

## 2023-11-29 NOTE — PROGRESS NOTES
[facility] STAFF HEMODIALYSIS NOTE     Patient currently on hemodialysis for removal of uremic toxins and volume.     Patient seen and evaluated on hemodialysis, tolerating treatment, see HD flowsheet for vitals and assessments.    Labs have been reviewed and the dialysate bath has been adjusted.       Assessment/Plan:    -LHC yesterday, medical therapy per cards   -Patient seen on HD, tolerating treatment well, w/o complaints   -UF goal of 2.5L  -Renal diet, if not NPO   -Strict I/O's and daily weights  -Daily renal function panels  -Keep MAP >65 while on HD   -Hgb goal 10-11  -Will continue to follow while inpatient     Gianna Watkins DNP-FNP, C  Nephrology  Pager: [phone]

## 2023-11-29 NOTE — PROGRESS NOTES
Patient arrived in a stretcher  to dialysis unit.   Report received from Katey primary nurse  VS's per dialysis Flowsheet.     Hemodialysis initiated using the following:     Dialysis Access: Right arm fistula     Needle size: 15G  Insertion with no complications.     Will Maintain telemetry and blood pressure monitoring throughout treatment.  Refer to dialysis flowsheet and MAR for details.

## 2023-11-29 NOTE — PLAN OF CARE
Pt in bed, awake and alert, AAOX4. Aptt drawn at this time. Heparin gtt infusing to lt forearm at 19u/kg/hr. Walker next to bed for ambulatory aid. Rt forearm AV fistula with bruit and thrill present. NAD noted at present. Will continue to monitor.  Problem: Adult Inpatient Plan of Care  Goal: Plan of Care Review  Outcome: Ongoing, Progressing  Goal: Patient-Specific Goal (Individualized)  Outcome: Ongoing, Progressing  Goal: Absence of Hospital-Acquired Illness or Injury  Outcome: Ongoing, Progressing  Goal: Optimal Comfort and Wellbeing  Outcome: Ongoing, Progressing  Goal: Readiness for Transition of Care  Outcome: Ongoing, Progressing     Problem: Diabetes Comorbidity  Goal: Blood Glucose Level Within Targeted Range  Outcome: Ongoing, Progressing     Problem: Fall Injury Risk  Goal: Absence of Fall and Fall-Related Injury  Outcome: Ongoing, Progressing     Problem: Device-Related Complication Risk (Hemodialysis)  Goal: Safe, Effective Therapy Delivery  Outcome: Ongoing, Progressing     Problem: Hemodynamic Instability (Hemodialysis)  Goal: Effective Tissue Perfusion  Outcome: Ongoing, Progressing     Problem: Infection (Hemodialysis)  Goal: Absence of Infection Signs and Symptoms  Outcome: Ongoing, Progressing     Problem: Electrolyte Imbalance (Chronic Kidney Disease)  Goal: Electrolyte Balance  Outcome: Ongoing, Progressing     Problem: Fluid Volume Excess (Chronic Kidney Disease)  Goal: Fluid Balance  Outcome: Ongoing, Progressing

## 2023-11-30 NOTE — PROGRESS NOTES
11/29/23 1756   Post-Hemodialysis Assessment   Rinseback Volume (mL) 250 mL   Blood Volume Processed (Liters) 59.4 L   Dialyzer Clearance Lightly streaked   Duration of Treatment 180 minutes   Additional Fluid Intake (mL) 300 mL   Total UF (mL) 3103 mL   Net Fluid Removal 2503   Patient Response to Treatment hypertensive during TX   Post-Hemodialysis Comments see notes     HD TX complete. Net removal 2503 ml. Pt AAO, VSS. NAD. Hemostasis achieved. Report given to primary nurse Katey. Awaiting transport to escort pt back to room via wheelchair.

## 2023-11-30 NOTE — NURSING
Discharge Note:pt dced to home via wc accompanied by transporter.Mother waiting downstairs.dc instructions given per Katey RN, previous Nurse. Sl dced prior to discharge.no distress noted.vss.

## 2023-11-30 NOTE — DISCHARGE SUMMARY
[facility]ation [address] [place] [address] Medicine  Discharge Summary      Patient Name: Haroldo Dickinson  MRN: [id number]  SADIE: [id number]  Patient Class: IP- Inpatient  Admission Date: 11/27/2023  Hospital Length of Stay: 1 days  Discharge Date and Time:  11/29/2023 6:34 PM  Attending Physician: Apple Arredondo MD   Discharging Provider: Mathieu Esteban PA-C  Primary Care Provider: Elizabeth Primary Doctor  [facility] Medicine Team: [facility]  Mathieu Esteban PA-C  Primary Care Team: [facility]    HPI:   Patient is a 48-year-old male with past medical history significant for CAD with previous PCI x2, CVA with residual right-sided weakness, RCC status post nephrectomy, ESRD Monday Wednesday Friday, hypertensive kidney disease who is presenting with a 5 day of intermittent chest pain.  Patient describes the chest pain as waxing and waning with a burning quality.  He rates it as a 7/10 at its worst.  He states that it is worse at night and seems to happen more often at that time.  It is not associated with exertion or emotional stress.  The patient of note does not take any aspirin or clopidogrel as he states that a physician here told him not to despite having PCI.  Patient states that he last got dialysis on Friday and feels that he is slightly fluid overloaded.  He had an episode of chest pain today that was associated with shortness of breath lasting 5 hours which prompted him to call an ambulance.  He denies any dizziness or diaphoresis associated with these episodes and states that after calling EMS, he felt better immediately.  In route to the emergency department, he received 1 dose of nitro with relief of his chest discomfort.  In the emergency room patient was hypertensive, non tachycardic on room air without discomfort.  Demonstrating no evidence of fluid overload on chest x-ray.  BNP was 179.  Troponins were slightly elevated but decreased compared to baseline at 0.096.  Potassium was markedly elevated at 5.7 with  evidence of peaked T-waves on EKG and he received calcium, insulin/glucose and Lokelma.  He does not make any urine due to his bilateral nephrectomy.  He received 3 space of nitro and 325 of aspirin.           Procedure(s) (LRB):  Angiogram, Coronary, with Left Heart Cath (N/A)      Hospital Course:   Haroldo Dickinson is placed in  Observation for management of chest pain and subsequent NSTEMI. Chest pain resolving on admission. EKG and CXR reviewed. Hyperkalemic on admission, shifted, with repeat K within normal limits. Troponin trending. Continuing ACS for now. Cardiology following. Nephro follow for HD needs. Dialysis 11/28 during admission. Unable to schedule PET today, will proceed with LHC. LHC with known RCA  and distal LAD lesions, Interventional Cardiology recommending medical therapy. Heparin gtt continued to complex 48 hours of medical treatment. HD 11/29.     Patient will continue ASA, plavix, and statin at discharge. He will stop amlodipine and start nifedipine, he will increase hydralazine to 50mg q8h,and increase coreg to 50mg bid. Medications sent to bedside delivery. He will follow up with Internal Medicine and Cardiology. He will resume his scheduled HD appointments. Return precautions provided. Patient medically ready for discharge. Plan of care discussed with patient, patient agreeable to plan, and all questions answered.       Goals of Care Treatment Preferences:  Code Status: Full Code      Consults:   Consults (From admission, onward)          Status Ordering Provider     Inpatient consult to Interventional Cardiology  Once        Provider:  (Not yet assigned)    Completed KEILA NORIEGA     Inpatient consult to Cardiology  Once        Provider:  (Not yet assigned)    Completed LG JOHNSON     Inpatient consult to Nephrology  Once        Provider:  (Not yet assigned)    Completed LG JOHNSON            No new Assessment & Plan notes have been filed under this hospital service  since the last note was generated.  Service: Hospital Medicine    Final Active Diagnoses:    Diagnosis Date Noted POA    PRINCIPAL PROBLEM:  NSTEMI (non-ST elevated myocardial infarction) [I21.4] 11/27/2023 Yes    Essential hypertension [I10] 11/21/2019 Yes    Hyperkalemia [E87.5] 11/30/2022 Yes    Hypertensive emergency [I16.1] 11/27/2023 Yes    Anemia in ESRD (end-stage renal disease) [N18.6, D63.1] 06/14/2022 Yes    Chronic kidney disease-mineral and bone disorder [N18.9, E83.9, M89.9] 06/14/2022 Yes    CAD (coronary artery disease), native coronary artery [I25.10] 11/21/2019 Yes    End stage renal failure on dialysis [N18.6, Z99.2] 06/05/2014 Not Applicable    Controlled type 2 diabetes mellitus with CKD [E11.22]  Yes      Problems Resolved During this Admission:       Discharged Condition: stable    Disposition: Home or Self Care    Follow Up:    Patient Instructions:      ACCEPT - Ambulatory referral/consult to Heart Failure Transitional Care [facility]   Standing Status: Future   Referral Priority: Routine Referral Type: Consultation   Referral Reason: Specialty Services Required   Requested Specialty: Cardiology   Number of Visits Requested: 1     Ambulatory referral/consult to Internal Medicine   Standing Status: Future   Referral Priority: Urgent Referral Type: Consultation   Referral Reason: Specialty Services Required   Requested Specialty: Internal Medicine   Number of Visits Requested: 1     Ambulatory referral/consult to Cardiology   Standing Status: Future   Referral Priority: Urgent Referral Type: Consultation   Referral Reason: Specialty Services Required   Requested Specialty: Cardiology   Number of Visits Requested: 1     Diet renal     Notify your health care provider if you experience any of the following:  severe uncontrolled pain     Notify your health care provider if you experience any of the following:  difficulty breathing or increased cough     Notify your health care provider if you  experience any of the following:  increased confusion or weakness     Notify your health care provider if you experience any of the following:  persistent dizziness, light-headedness, or visual disturbances     Activity as tolerated       Significant Diagnostic Studies: N/A    Pending Diagnostic Studies:       None           Medications:  Reconciled Home Medications:      Medication List        START taking these medications      atorvastatin 80 MG tablet  Commonly known as: LIPITOR  Take 1 tablet (80 mg total) by mouth once daily.  Start taking on: November 30, 2023     clopidogreL 75 mg tablet  Commonly known as: PLAVIX  Take 1 tablet (75 mg total) by mouth once daily.  Start taking on: November 30, 2023     NIFEdipine 60 MG (OSM) 24 hr tablet  Commonly known as: PROCARDIA-XL  Take 1 tablet (60 mg total) by mouth once daily.  Start taking on: November 30, 2023            CHANGE how you take these medications      carvediloL 25 MG tablet  Commonly known as: COREG  Take 2 tablets (50 mg total) by mouth 2 (two) times daily with meals.  What changed: how much to take     hydrALAZINE 50 MG tablet  Commonly known as: APRESOLINE  Take 1 tablet (50 mg total) by mouth every 8 (eight) hours. for SBP > 140mm HG  What changed:   medication strength  how much to take            CONTINUE taking these medications      aspirin 81 MG EC tablet  Commonly known as: ECOTRIN  Take 1 tablet (81 mg total) by mouth once daily.     ciclopirox 8 % Soln  Commonly known as: PENLAC  Apply topically nightly.     cinacalcet 90 MG Tab  Commonly known as: SENSIPAR  Take 1 tablet by mouth once daily.     polyethylene glycol 17 gram/dose powder  Commonly known as: GLYCOLAX  Use cap to measure 17 g, then mix in liquid and drink by mouth once daily.     sevelamer carbonate 800 mg Tab  Commonly known as: RENVELA  Take 800 mg by mouth 3 (three) times daily with meals.            STOP taking these medications      amLODIPine 10 MG tablet  Commonly known  as: NORVASC              Indwelling Lines/Drains at time of discharge:   Lines/Drains/Airways       Drain  Duration                  Hemodialysis AV Fistula Right forearm -- days                    Time spent on the discharge of patient: 33 minutes         Mathieu Esteban PA-C  Department of Hospital Medicine  [facility]

## 2023-12-01 ENCOUNTER — DOCUMENTATION ONLY (OUTPATIENT)
Dept: CARDIOLOGY | Facility: CLINIC | Age: 48
End: 2023-12-01
Payer: MEDICARE

## 2023-12-01 NOTE — PROGRESS NOTES
Heart Failure [facility]itional [facility] (HFTCC) Team notified of pt referral via Ambulatory Referral to Heart Failure Transitional Care ([id number]).    Patient screened today December 1, 2023 by provider and RN for enrollment to program.      Pt was deemed not a candidate for enrollment at this time related to patient is currently on hemo-dialysis.    Pt will require additional follow up planning per primary team.     If pt status, diagnosis, or treatment plan changes , please place AMB referral to Heart Failure Transitional Care Clinic ([id number]) for HFTC enrollment re-evalution.

## 2023-12-06 ENCOUNTER — TELEPHONE (OUTPATIENT)
Dept: FAMILY MEDICINE | Facility: CLINIC | Age: 48
End: 2023-12-06

## 2023-12-07 ENCOUNTER — OFFICE VISIT (OUTPATIENT)
Dept: CARDIOLOGY | Facility: CLINIC | Age: 48
End: 2023-12-07
Payer: MEDICARE

## 2023-12-07 VITALS
WEIGHT: 164.25 LBS | SYSTOLIC BLOOD PRESSURE: 118 MMHG | BODY MASS INDEX: 25.72 KG/M2 | HEART RATE: 73 BPM | DIASTOLIC BLOOD PRESSURE: 76 MMHG

## 2023-12-07 DIAGNOSIS — I77.0 ARTERIOVENOUS FISTULA: ICD-10-CM

## 2023-12-07 DIAGNOSIS — E78.2 MIXED HYPERLIPIDEMIA: ICD-10-CM

## 2023-12-07 DIAGNOSIS — I25.10 CORONARY ARTERY DISEASE INVOLVING NATIVE CORONARY ARTERY OF NATIVE HEART WITHOUT ANGINA PECTORIS: ICD-10-CM

## 2023-12-07 DIAGNOSIS — I10 ESSENTIAL HYPERTENSION: ICD-10-CM

## 2023-12-07 DIAGNOSIS — I22.2 SUBSEQUENT NON-ST ELEVATION (NSTEMI) MYOCARDIAL INFARCTION: Primary | ICD-10-CM

## 2023-12-07 PROCEDURE — 93000 ELECTROCARDIOGRAM COMPLETE: CPT | Mod: S$GLB,,, | Performed by: INTERNAL MEDICINE

## 2023-12-07 PROCEDURE — 3078F DIAST BP <80 MM HG: CPT | Mod: CPTII,S$GLB,, | Performed by: INTERNAL MEDICINE

## 2023-12-07 PROCEDURE — 3074F SYST BP LT 130 MM HG: CPT | Mod: CPTII,S$GLB,, | Performed by: INTERNAL MEDICINE

## 2023-12-07 PROCEDURE — 1111F DSCHRG MED/CURRENT MED MERGE: CPT | Mod: CPTII,S$GLB,, | Performed by: INTERNAL MEDICINE

## 2023-12-07 PROCEDURE — 3066F NEPHROPATHY DOC TX: CPT | Mod: CPTII,S$GLB,, | Performed by: INTERNAL MEDICINE

## 2023-12-07 PROCEDURE — 3008F PR BODY MASS INDEX (BMI) DOCUMENTED: ICD-10-PCS | Mod: CPTII,S$GLB,, | Performed by: INTERNAL MEDICINE

## 2023-12-07 PROCEDURE — 99999 PR PBB SHADOW E&M-EST. PATIENT-LVL IV: ICD-10-PCS | Mod: PBBFAC,,, | Performed by: INTERNAL MEDICINE

## 2023-12-07 PROCEDURE — 3074F PR MOST RECENT SYSTOLIC BLOOD PRESSURE < 130 MM HG: ICD-10-PCS | Mod: CPTII,S$GLB,, | Performed by: INTERNAL MEDICINE

## 2023-12-07 PROCEDURE — 3008F BODY MASS INDEX DOCD: CPT | Mod: CPTII,S$GLB,, | Performed by: INTERNAL MEDICINE

## 2023-12-07 PROCEDURE — 1159F PR MEDICATION LIST DOCUMENTED IN MEDICAL RECORD: ICD-10-PCS | Mod: CPTII,S$GLB,, | Performed by: INTERNAL MEDICINE

## 2023-12-07 PROCEDURE — 99214 PR OFFICE/OUTPT VISIT, EST, LEVL IV, 30-39 MIN: ICD-10-PCS | Mod: S$GLB,,, | Performed by: INTERNAL MEDICINE

## 2023-12-07 PROCEDURE — 3066F PR DOCUMENTATION OF TREATMENT FOR NEPHROPATHY: ICD-10-PCS | Mod: CPTII,S$GLB,, | Performed by: INTERNAL MEDICINE

## 2023-12-07 PROCEDURE — 1111F PR DISCHARGE MEDS RECONCILED W/ CURRENT OUTPATIENT MED LIST: ICD-10-PCS | Mod: CPTII,S$GLB,, | Performed by: INTERNAL MEDICINE

## 2023-12-07 PROCEDURE — 1160F RVW MEDS BY RX/DR IN RCRD: CPT | Mod: CPTII,S$GLB,, | Performed by: INTERNAL MEDICINE

## 2023-12-07 PROCEDURE — 3044F HG A1C LEVEL LT 7.0%: CPT | Mod: CPTII,S$GLB,, | Performed by: INTERNAL MEDICINE

## 2023-12-07 PROCEDURE — 99999 PR PBB SHADOW E&M-EST. PATIENT-LVL IV: CPT | Mod: PBBFAC,,, | Performed by: INTERNAL MEDICINE

## 2023-12-07 PROCEDURE — 93000 EKG 12-LEAD: ICD-10-PCS | Mod: S$GLB,,, | Performed by: INTERNAL MEDICINE

## 2023-12-07 PROCEDURE — 1159F MED LIST DOCD IN RCRD: CPT | Mod: CPTII,S$GLB,, | Performed by: INTERNAL MEDICINE

## 2023-12-07 PROCEDURE — 3078F PR MOST RECENT DIASTOLIC BLOOD PRESSURE < 80 MM HG: ICD-10-PCS | Mod: CPTII,S$GLB,, | Performed by: INTERNAL MEDICINE

## 2023-12-07 PROCEDURE — 3044F PR MOST RECENT HEMOGLOBIN A1C LEVEL <7.0%: ICD-10-PCS | Mod: CPTII,S$GLB,, | Performed by: INTERNAL MEDICINE

## 2023-12-07 PROCEDURE — 99214 OFFICE O/P EST MOD 30 MIN: CPT | Mod: S$GLB,,, | Performed by: INTERNAL MEDICINE

## 2023-12-07 PROCEDURE — 1160F PR REVIEW ALL MEDS BY PRESCRIBER/CLIN PHARMACIST DOCUMENTED: ICD-10-PCS | Mod: CPTII,S$GLB,, | Performed by: INTERNAL MEDICINE

## 2023-12-07 RX ORDER — EZETIMIBE 10 MG/1
10 TABLET ORAL DAILY
Qty: 90 TABLET | Refills: 3 | Status: SHIPPED | OUTPATIENT
Start: 2023-12-07 | End: 2024-12-06

## 2023-12-07 NOTE — PROGRESS NOTES
Subjective:   12/07/2023     Patient ID:  Haroldo Dickinson is a 48 y.o. male [facility]      Hospital records reviewed, recently hospitalized with chest pain, underwent cardiac catheterization.  He was found have a severely diseased distal LAD, but the proximal LAD had only mild disease.  The proximal circumflex also was free of disease, he would an occluded distal circumflex.  The right coronary artery is occluded, but appeared to be fairly small-vessel.  He is done well with alterations medical therapy with no exertional chest pains or tightness.      Blood pressure is well controlled.    Laboratory from May of 2023 showed an LDL cholesterol 74, should be less than 70 mg/dL, now on a atorvastatin 80 mg nightly, will add ezetimibe 10 mg daily.      Patient with chronic kidney failure, continues on dialysis.            Recent hospital note reviewed:     [facility]  Hospital Medicine  Discharge Summary        Patient Name: Haroldo Dickinson  MRN: [id number]  SADIE: [id number]  Patient Class: IP- Inpatient  Admission Date: 11/27/2023  Hospital Length of Stay: 1 days  Discharge Date and Time:  11/29/2023 6:34 PM  Attending Physician: Apple Arredondo MD   Discharging Provider: Mathieu Esteban PA-C  Primary Care Provider: [facility]  [facility] Medicine Team: [facility] MED  Mathieu Esteban PA-C  Primary Care Team: [facility]     HPI:   Patient is a 48-year-old male with past medical history significant for CAD with previous PCI x2, CVA with residual right-sided weakness, RCC status post nephrectomy, ESRD Monday Wednesday Friday, hypertensive kidney disease who is presenting with a 5 day of intermittent chest pain.  Patient describes the chest pain as waxing and waning with a burning quality.  He rates it as a 7/10 at its worst.  He states that it is worse at night and seems to happen more often at that time.  It is not associated with exertion or  emotional stress.  The patient of note does not take any aspirin or clopidogrel as he states that a physician here told him not to despite having PCI.  Patient states that he last got dialysis on Friday and feels that he is slightly fluid overloaded.  He had an episode of chest pain today that was associated with shortness of breath lasting 5 hours which prompted him to call an ambulance.  He denies any dizziness or diaphoresis associated with these episodes and states that after calling EMS, he felt better immediately.  In route to the emergency department, he received 1 dose of nitro with relief of his chest discomfort.  In the emergency room patient was hypertensive, non tachycardic on room air without discomfort.  Demonstrating no evidence of fluid overload on chest x-ray.  BNP was 179.  Troponins were slightly elevated but decreased compared to baseline at 0.096.  Potassium was markedly elevated at 5.7 with evidence of peaked T-waves on EKG and he received calcium, insulin/glucose and Lokelma.  He does not make any urine due to his bilateral nephrectomy.  He received 3 space of nitro and 325 of aspirin.             Procedure(s) (LRB):  Angiogram, Coronary, with Left Heart Cath (N/A)       Hospital Course:   Haroldo Dickinson is placed in  Observation for management of chest pain and subsequent NSTEMI. Chest pain resolving on admission. EKG and CXR reviewed. Hyperkalemic on admission, shifted, with repeat K within normal limits. Troponin trending. Continuing ACS for now. Cardiology following. Nephro follow for HD needs. Dialysis 11/28 during admission. Unable to schedule PET today, will proceed with C. [facility] with known RCA  and distal LAD lesions, Interventional Cardiology recommending medical therapy. Heparin gtt continued to complex 48 hours of medical treatment. HD 11/29.      Patient will continue ASA, plavix, and statin at discharge. He will stop amlodipine and start nifedipine, he will increase  hydralazine to 50mg q8h,and increase coreg to 50mg bid. Medications sent to bedside delivery. He will follow up with Internal Medicine and Cardiology. He will resume his scheduled HD appointments. Return precautions provided. Patient medically ready for discharge. Plan of care discussed with patient, patient agreeable to plan, and all questions answered.        Goals of Care Treatment Preferences:  Code Status: Full Code        Consults:     Consults (From admission, onward)             Status Ordering Provider        Inpatient consult to Interventional Cardiology  Once       Provider:  (Not yet assigned)   Completed KEILA NORIEGA        Inpatient consult to Cardiology  Once       Provider:  (Not yet assigned)   Completed LG JOHNSON        Inpatient consult to Nephrology  Once       Provider:  (Not yet assigned)   Completed LG JOHNSON                No new Assessment & Plan notes have been filed under this hospital service since the last note was generated.  Service: Hospital Medicine       Final Active Diagnoses:    Diagnosis Date Noted POA  · PRINCIPAL PROBLEM:  NSTEMI (non-ST elevated myocardial infarction) [I21.4] 11/27/2023 Yes  · Essential hypertension [I10] 11/21/2019 Yes  · Hyperkalemia [E87.5] 11/30/2022 Yes  · Hypertensive emergency [I16.1] 11/27/2023 Yes  · Anemia in ESRD (end-stage renal disease) [N18.6, D63.1] 06/14/2022 Yes  · Chronic kidney disease-mineral and bone disorder [N18.9, E83.9, M89.9] 06/14/2022 Yes  · CAD (coronary artery disease), native coronary artery [I25.10] 11/21/2019 Yes  · End stage renal failure on dialysis [N18.6, Z99.2] 06/05/2014 Not Applicable  · Controlled type 2 diabetes mellitus with CKD [E11.22]   Yes     Problems Resolved During this Admission:        Discharged Condition: stable     Disposition: Home or Self Care     Follow Up:     Patient Instructions:         ACCEPT - Ambulatory referral/consult to [facility]   Standing  Status: Future  Referral Priority: Routine Referral Type: Consultation   Referral Reason: Specialty Services Required   Requested Specialty: Cardiology   Number of Visits Requested: 1        Ambulatory referral/consult to Internal Medicine   Standing Status: Future  Referral Priority: Urgent Referral Type: Consultation   Referral Reason: Specialty Services Required   Requested Specialty: Internal Medicine   Number of Visits Requested: 1        Ambulatory referral/consult to Cardiology   Standing Status: Future  Referral Priority: Urgent Referral Type: Consultation   Referral Reason: Specialty Services Required   Requested Specialty: Cardiology   Number of Visits Requested: 1        Diet renal       Notify your health care provider if you experience any of the following:  severe uncontrolled pain       Notify your health care provider if you experience any of the following:  difficulty breathing or increased cough       Notify your health care provider if you experience any of the following:  increased confusion or weakness       Notify your health care provider if you experience any of the following:  persistent dizziness, light-headedness, or visual disturbances       Activity as tolerated        Significant Diagnostic Studies: N/A       Pending Diagnostic Studies:          None             Medications:  Reconciled Home Medications:        Medication List          START taking these medications        atorvastatin 80 MG tablet  Commonly known as: LIPITOR  Take 1 tablet (80 mg total) by mouth once daily.  Start taking on: November 30, 2023     clopidogreL 75 mg tablet  Commonly known as: PLAVIX  Take 1 tablet (75 mg total) by mouth once daily.  Start taking on: November 30, 2023     NIFEdipine 60 MG (OSM) 24 hr tablet  Commonly known as: PROCARDIA-XL  Take 1 tablet (60 mg total) by mouth once daily.  Start taking on: November 30, 2023                  CHANGE how you take these medications        carvediloL 25 MG  tablet  Commonly known as: COREG  Take 2 tablets (50 mg total) by mouth 2 (two) times daily with meals.  What changed: how much to take     hydrALAZINE 50 MG tablet  Commonly known as: APRESOLINE  Take 1 tablet (50 mg total) by mouth every 8 (eight) hours. for SBP > 140mm HG  What changed:   · medication strength  · how much to take                  CONTINUE taking these medications        aspirin 81 MG EC tablet  Commonly known as: ECOTRIN  Take 1 tablet (81 mg total) by mouth once daily.     ciclopirox 8 % Soln  Commonly known as: PENLAC  Apply topically nightly.     cinacalcet 90 MG Tab  Commonly known as: SENSIPAR  Take 1 tablet by mouth once daily.     polyethylene glycol 17 gram/dose powder  Commonly known as: GLYCOLAX  Use cap to measure 17 g, then mix in liquid and drink by mouth once daily.     sevelamer carbonate 800 mg Tab  Commonly known as: RENVELA  Take 800 mg by mouth 3 (three) times daily with meals.                  STOP taking these medications        amLODIPine 10 MG tablet  Commonly known as: NORVASC                   Indwelling Lines/Drains at time of discharge:     Lines/Drains/Airways            Drain  Duration                     Hemodialysis AV Fistula Right forearm -- days                          Time spent on the discharge of patient: 33 minutes          Past Medical History:   Diagnosis Date    CAD (coronary artery disease), native coronary artery 11/21/2019    Cataract     Diabetes mellitus     Diabetic retinopathy     Dialysis patient     DM type 2 causing renal disease, not at goal     ESRD (end stage renal disease) started dialysis 01/2014 06/05/2014    Hyperparathyroidism, secondary renal 06/05/2014    Hypertension     NSTEMI (non-ST elevated myocardial infarction) 12/21/2013    Organ transplant candidate 06/05/2014    Pneumonia     Renal cell carcinoma of right kidney     Renal hypertension     Stroke        Review of patient's allergies indicates:   Allergen Reactions    No known  allergies          Current Outpatient Medications:     aspirin (ECOTRIN) 81 MG EC tablet, Take 1 tablet (81 mg total) by mouth once daily., Disp: 30 tablet, Rfl: 11    atorvastatin (LIPITOR) 80 MG tablet, Take 1 tablet (80 mg total) by mouth once daily., Disp: 90 tablet, Rfl: 3    carvediloL (COREG) 25 MG tablet, Take 2 tablets (50 mg total) by mouth 2 (two) times daily with meals., Disp: 360 tablet, Rfl: 3    ciclopirox (PENLAC) 8 % Soln, Apply topically nightly., Disp: 6.6 mL, Rfl: 3    cinacalcet (SENSIPAR) 90 MG Tab, Take 1 tablet by mouth once daily., Disp: , Rfl:     clopidogreL (PLAVIX) 75 mg tablet, Take 1 tablet (75 mg total) by mouth once daily., Disp: 90 tablet, Rfl: 3    hydrALAZINE (APRESOLINE) 50 MG tablet, Take 1 tablet (50 mg total) by mouth every 8 (eight) hours. for SBP > 140mm HG, Disp: 270 tablet, Rfl: 3    NIFEdipine (PROCARDIA-XL) 60 MG (OSM) 24 hr tablet, Take 1 tablet (60 mg total) by mouth once daily., Disp: 90 tablet, Rfl: 3    polyethylene glycol (GLYCOLAX) 17 gram/dose powder, Use cap to measure 17 g, then mix in liquid and drink by mouth once daily., Disp: 238 g, Rfl: 0    sevelamer carbonate (RENVELA) 800 mg Tab, Take 800 mg by mouth 3 (three) times daily with meals., Disp: , Rfl:     ezetimibe (ZETIA) 10 mg tablet, Take 1 tablet (10 mg total) by mouth once daily., Disp: 90 tablet, Rfl: 3  No current facility-administered medications for this visit.    Facility-Administered Medications Ordered in Other Visits:     lidocaine (PF) 10 mg/ml (1%) injection 10 mg, 1 mL, Intradermal, Once, Michelle Spence MD     Objective:   Review of Systems   Cardiovascular:  Positive for dyspnea on exertion. Negative for chest pain, claudication, cyanosis, irregular heartbeat, leg swelling, near-syncope, orthopnea, palpitations, paroxysmal nocturnal dyspnea and syncope.         Vitals:    12/07/23 0917   BP: 118/76   Pulse: 73     Wt Readings from Last 3 Encounters:   12/07/23 74.5 kg (164 lb 3.9  oz)   11/29/23 77 kg (169 lb 12.1 oz)   09/05/23 69.9 kg (154 lb 1.6 oz)     Temp Readings from Last 3 Encounters:   11/29/23 98.3 °F (36.8 °C) (Oral)   05/22/23 97.7 °F (36.5 °C)   05/03/23 98.1 °F (36.7 °C) (Oral)     BP Readings from Last 3 Encounters:   12/07/23 118/76   11/29/23 (!) 141/79   05/22/23 (!) 140/80     Pulse Readings from Last 3 Encounters:   12/07/23 73   11/29/23 92   05/22/23 76             Physical Exam  Vitals reviewed.   Constitutional:       General: He is not in acute distress.     Appearance: He is well-developed.   HENT:      Head: Normocephalic and atraumatic.      Nose: Nose normal.   Eyes:      Conjunctiva/sclera: Conjunctivae normal.      Pupils: Pupils are equal, round, and reactive to light.   Neck:      Vascular: No carotid bruit or JVD.   Cardiovascular:      Rate and Rhythm: Normal rate and regular rhythm.      Pulses: Intact distal pulses.      Heart sounds: Normal heart sounds. No murmur heard.     No friction rub. No gallop.   Pulmonary:      Effort: Pulmonary effort is normal. No respiratory distress.      Breath sounds: Normal breath sounds. No wheezing or rales.   Chest:      Chest wall: No tenderness.   Abdominal:      General: Bowel sounds are normal. There is no distension.      Palpations: Abdomen is soft.      Tenderness: There is no abdominal tenderness.   Musculoskeletal:         General: No tenderness or deformity. Normal range of motion.      Cervical back: Normal range of motion and neck supple.      Right lower leg: No edema.      Left lower leg: No edema.   Skin:     General: Skin is warm and dry.      Findings: No erythema or rash.   Neurological:      Mental Status: He is alert and oriented to person, place, and time.      Cranial Nerves: No cranial nerve deficit.      Motor: No abnormal muscle tone.      Coordination: Coordination normal.   Psychiatric:         Behavior: Behavior normal.         Thought Content: Thought content normal.         Judgment:  Judgment normal.           Lab Results   Component Value Date    CHOL 144 05/02/2023    CHOL 111 (L) 01/02/2020    CHOL 123 07/17/2017     Lab Results   Component Value Date    HDL 51 05/02/2023    HDL 56 01/02/2020    HDL 47 07/17/2017     Lab Results   Component Value Date    LDLCALC 74.2 05/02/2023    LDLCALC 48.2 (L) 01/02/2020    LDLCALC 56.4 (L) 07/17/2017     Lab Results   Component Value Date    ALT 9 (L) 11/29/2023    AST 10 11/29/2023    AST 9 (L) 11/28/2023    AST 11 11/27/2023     Lab Results   Component Value Date    CREATININE 12.4 (H) 11/29/2023    BUN 69 (H) 11/29/2023     11/29/2023    K 4.6 11/29/2023    CO2 19 (L) 11/29/2023    CO2 23 11/28/2023    CO2 24 11/27/2023     Lab Results   Component Value Date    HGB 8.2 (L) 11/29/2023    HGB 8.2 (L) 11/29/2023    HCT 25.0 (L) 11/29/2023    HCT 25.0 (L) 11/29/2023    HCT 27.4 (L) 11/28/2023    HCT 27.4 (L) 11/28/2023                         Assessment and Plan:     Subsequent non-ST elevation (NSTEMI) myocardial infarction  -     Ambulatory referral/consult to Cardiology    Coronary artery disease involving native coronary artery of native heart without angina pectoris  Comments:  Currently asymptomatic with altered medical therapy    Arteriovenous fistula    Mixed hyperlipidemia  Comments:  Add ezetimibe to high-dose statin  LDL goal less than 50  Orders:  -     ezetimibe (ZETIA) 10 mg tablet; Take 1 tablet (10 mg total) by mouth once daily.  Dispense: 90 tablet; Refill: 3    Essential hypertension  Comments:  Well controlled on current meds, reviewed         No follow-ups on file.          Future Appointments   Date Time Provider Department Center   12/12/2023  9:30 AM Mireya Larose MD [facility] [place]   1/2/2024 10:30 AM Aisha Olsen DPM [facility] POD [place]   2/8/2024 10:45 AM [facility]   2/8/2024 11:00 AM [facility] MRI1 [facility]   2/15/2024 11:00 AM Catrina Zendejas NP [facility] UROLOG [facility]

## 2023-12-12 ENCOUNTER — OFFICE VISIT (OUTPATIENT)
Dept: FAMILY MEDICINE | Facility: CLINIC | Age: 48
End: 2023-12-12
Payer: MEDICARE

## 2023-12-12 VITALS
SYSTOLIC BLOOD PRESSURE: 116 MMHG | HEIGHT: 67 IN | BODY MASS INDEX: 25.57 KG/M2 | HEART RATE: 68 BPM | WEIGHT: 162.94 LBS | OXYGEN SATURATION: 98 % | TEMPERATURE: 99 F | RESPIRATION RATE: 18 BRPM | DIASTOLIC BLOOD PRESSURE: 71 MMHG

## 2023-12-12 DIAGNOSIS — N18.6 END STAGE RENAL FAILURE ON DIALYSIS: ICD-10-CM

## 2023-12-12 DIAGNOSIS — N18.6 CONTROLLED TYPE 2 DIABETES MELLITUS WITH CHRONIC KIDNEY DISEASE ON CHRONIC DIALYSIS, WITHOUT LONG-TERM CURRENT USE OF INSULIN: Primary | ICD-10-CM

## 2023-12-12 DIAGNOSIS — I21.4 NSTEMI (NON-ST ELEVATED MYOCARDIAL INFARCTION): ICD-10-CM

## 2023-12-12 DIAGNOSIS — Z99.2 END STAGE RENAL FAILURE ON DIALYSIS: ICD-10-CM

## 2023-12-12 DIAGNOSIS — I10 ESSENTIAL HYPERTENSION: ICD-10-CM

## 2023-12-12 DIAGNOSIS — Z76.82 PATIENT ON WAITING LIST FOR KIDNEY TRANSPLANT: ICD-10-CM

## 2023-12-12 DIAGNOSIS — E11.22 CONTROLLED TYPE 2 DIABETES MELLITUS WITH CHRONIC KIDNEY DISEASE ON CHRONIC DIALYSIS, WITHOUT LONG-TERM CURRENT USE OF INSULIN: Primary | ICD-10-CM

## 2023-12-12 DIAGNOSIS — I25.10 CORONARY ARTERY DISEASE INVOLVING NATIVE CORONARY ARTERY OF NATIVE HEART WITHOUT ANGINA PECTORIS: ICD-10-CM

## 2023-12-12 DIAGNOSIS — Z99.2 CONTROLLED TYPE 2 DIABETES MELLITUS WITH CHRONIC KIDNEY DISEASE ON CHRONIC DIALYSIS, WITHOUT LONG-TERM CURRENT USE OF INSULIN: Primary | ICD-10-CM

## 2023-12-12 PROBLEM — I16.1 HYPERTENSIVE EMERGENCY: Status: RESOLVED | Noted: 2023-11-27 | Resolved: 2023-12-12

## 2023-12-12 PROBLEM — R79.89 ELEVATED TROPONIN: Status: RESOLVED | Noted: 2022-06-13 | Resolved: 2023-12-12

## 2023-12-12 PROBLEM — N28.89 LEFT RENAL MASS: Status: RESOLVED | Noted: 2022-05-19 | Resolved: 2023-12-12

## 2023-12-12 PROBLEM — Z12.11 SCREENING FOR COLON CANCER: Status: RESOLVED | Noted: 2021-02-09 | Resolved: 2023-12-12

## 2023-12-12 PROBLEM — R22.9 SOFT TISSUE SWELLING: Status: RESOLVED | Noted: 2022-12-29 | Resolved: 2023-12-12

## 2023-12-12 PROBLEM — I16.0 HYPERTENSIVE URGENCY: Status: RESOLVED | Noted: 2022-06-13 | Resolved: 2023-12-12

## 2023-12-12 PROBLEM — N28.9 HYPERVOLEMIA ASSOCIATED WITH RENAL INSUFFICIENCY: Status: RESOLVED | Noted: 2022-06-13 | Resolved: 2023-12-12

## 2023-12-12 PROBLEM — E87.70 HYPERVOLEMIA ASSOCIATED WITH RENAL INSUFFICIENCY: Status: RESOLVED | Noted: 2022-06-13 | Resolved: 2023-12-12

## 2023-12-12 PROBLEM — E87.5 HYPERKALEMIA: Status: RESOLVED | Noted: 2022-11-30 | Resolved: 2023-12-12

## 2023-12-12 PROBLEM — Z12.11 COLON CANCER SCREENING: Status: RESOLVED | Noted: 2021-02-09 | Resolved: 2023-12-12

## 2023-12-12 PROBLEM — E11.3513 CONTROLLED TYPE 2 DIABETES MELLITUS WITH BOTH EYES AFFECTED BY PROLIFERATIVE RETINOPATHY AND MACULAR EDEMA, WITHOUT LONG-TERM CURRENT USE OF INSULIN: Status: RESOLVED | Noted: 2021-08-24 | Resolved: 2023-12-12

## 2023-12-12 PROCEDURE — 3066F NEPHROPATHY DOC TX: CPT | Mod: CPTII,S$GLB,, | Performed by: INTERNAL MEDICINE

## 2023-12-12 PROCEDURE — 3008F PR BODY MASS INDEX (BMI) DOCUMENTED: ICD-10-PCS | Mod: CPTII,S$GLB,, | Performed by: INTERNAL MEDICINE

## 2023-12-12 PROCEDURE — 1160F RVW MEDS BY RX/DR IN RCRD: CPT | Mod: CPTII,S$GLB,, | Performed by: INTERNAL MEDICINE

## 2023-12-12 PROCEDURE — 3074F PR MOST RECENT SYSTOLIC BLOOD PRESSURE < 130 MM HG: ICD-10-PCS | Mod: CPTII,S$GLB,, | Performed by: INTERNAL MEDICINE

## 2023-12-12 PROCEDURE — 3008F BODY MASS INDEX DOCD: CPT | Mod: CPTII,S$GLB,, | Performed by: INTERNAL MEDICINE

## 2023-12-12 PROCEDURE — 99214 PR OFFICE/OUTPT VISIT, EST, LEVL IV, 30-39 MIN: ICD-10-PCS | Mod: S$GLB,,, | Performed by: INTERNAL MEDICINE

## 2023-12-12 PROCEDURE — 99999 PR PBB SHADOW E&M-EST. PATIENT-LVL V: CPT | Mod: PBBFAC,,, | Performed by: INTERNAL MEDICINE

## 2023-12-12 PROCEDURE — 3066F PR DOCUMENTATION OF TREATMENT FOR NEPHROPATHY: ICD-10-PCS | Mod: CPTII,S$GLB,, | Performed by: INTERNAL MEDICINE

## 2023-12-12 PROCEDURE — 1111F PR DISCHARGE MEDS RECONCILED W/ CURRENT OUTPATIENT MED LIST: ICD-10-PCS | Mod: CPTII,S$GLB,, | Performed by: INTERNAL MEDICINE

## 2023-12-12 PROCEDURE — 3044F PR MOST RECENT HEMOGLOBIN A1C LEVEL <7.0%: ICD-10-PCS | Mod: CPTII,S$GLB,, | Performed by: INTERNAL MEDICINE

## 2023-12-12 PROCEDURE — 1159F PR MEDICATION LIST DOCUMENTED IN MEDICAL RECORD: ICD-10-PCS | Mod: CPTII,S$GLB,, | Performed by: INTERNAL MEDICINE

## 2023-12-12 PROCEDURE — 1160F PR REVIEW ALL MEDS BY PRESCRIBER/CLIN PHARMACIST DOCUMENTED: ICD-10-PCS | Mod: CPTII,S$GLB,, | Performed by: INTERNAL MEDICINE

## 2023-12-12 PROCEDURE — 1111F DSCHRG MED/CURRENT MED MERGE: CPT | Mod: CPTII,S$GLB,, | Performed by: INTERNAL MEDICINE

## 2023-12-12 PROCEDURE — 1159F MED LIST DOCD IN RCRD: CPT | Mod: CPTII,S$GLB,, | Performed by: INTERNAL MEDICINE

## 2023-12-12 PROCEDURE — 99999 PR PBB SHADOW E&M-EST. PATIENT-LVL V: ICD-10-PCS | Mod: PBBFAC,,, | Performed by: INTERNAL MEDICINE

## 2023-12-12 PROCEDURE — 3074F SYST BP LT 130 MM HG: CPT | Mod: CPTII,S$GLB,, | Performed by: INTERNAL MEDICINE

## 2023-12-12 PROCEDURE — 3078F DIAST BP <80 MM HG: CPT | Mod: CPTII,S$GLB,, | Performed by: INTERNAL MEDICINE

## 2023-12-12 PROCEDURE — 99214 OFFICE O/P EST MOD 30 MIN: CPT | Mod: S$GLB,,, | Performed by: INTERNAL MEDICINE

## 2023-12-12 PROCEDURE — 3078F PR MOST RECENT DIASTOLIC BLOOD PRESSURE < 80 MM HG: ICD-10-PCS | Mod: CPTII,S$GLB,, | Performed by: INTERNAL MEDICINE

## 2023-12-12 PROCEDURE — 3044F HG A1C LEVEL LT 7.0%: CPT | Mod: CPTII,S$GLB,, | Performed by: INTERNAL MEDICINE

## 2023-12-12 RX ORDER — HYDRALAZINE HYDROCHLORIDE 50 MG/1
50 TABLET, FILM COATED ORAL DAILY
Qty: 90 TABLET | Refills: 3 | Status: ON HOLD
Start: 2023-12-12 | End: 2024-03-11 | Stop reason: SDUPTHER

## 2023-12-12 NOTE — ASSESSMENT & PLAN NOTE
Chronic, stable. Doing well on dialysis. Refer to HPI     - asked patient to talk to nephrologist about need to start EPO  - reminded him to take sevelamer and the importance of if   - patient educated about importance of fluid restriction

## 2023-12-12 NOTE — ASSESSMENT & PLAN NOTE
Chronic, stable. Recent NSTEMI. Refer to HPI   BP stable     - DAPT for one year  - one lipitor 60mg and ezetimibe   - coreg 50mg BID

## 2023-12-12 NOTE — ASSESSMENT & PLAN NOTE
Was told that he needed colonoscopy before transplant. His last one was in 2021 and had polyps. Due for repeat only in 2026.     - asked patient to clarify the reasoning with his transplant doctor

## 2023-12-12 NOTE — ASSESSMENT & PLAN NOTE
Chronic, controlled. Taking hydralazine OD     - continue hydralazine OD, nifedipine and coreg   - encouraged patient to measure BP everyday and let me know if it goes up

## 2023-12-12 NOTE — PROGRESS NOTES
Chief Complaint: Hospital Follow Up      Haroldo Dickinson  is a 48 y.o. year old patient who presents today for hospital follow up     Was admitted for chest pain, found to have NSTEMI. PCI showed diseased LAD but no stenting. Was decided to have medical mgmt. Patient has been compliant to medications. Followed up with cardiologist. Reports resolution of his chest pain. Has no issues with HD on MWF. Fistula appears normal, no bleeding. Is aware of fluid restriction of 1L a day. Does not have sx of anemia.     Past Surgical History:   Procedure Laterality Date    ANGIOGRAM, CORONARY, WITH LEFT HEART CATHETERIZATION N/A 7/1/2021    Procedure: Angiogram, Coronary, with Left Heart Cath;  Surgeon: Miki Zendejas MD;  Location: [facility] CATH LAB;  Service: Cardiology;  Laterality: N/A;    ANGIOGRAM, CORONARY, WITH LEFT HEART CATHETERIZATION N/A 11/28/2023    Procedure: Angiogram, Coronary, with Left Heart Cath;  Surgeon: Noah Pendleton MD;  Location: [facility] CATH LAB;  Service: Cardiology;  Laterality: N/A;    COLONOSCOPY N/A 5/3/2017    Procedure: COLONOSCOPY;  Surgeon: Rufino Carpenter MD;  Location: [facility] (4TH FLR);  Service: Endoscopy;  Laterality: N/A;  pt states can only schedule on Wednesdays    COLONOSCOPY N/A 2/9/2021    Procedure: COLONOSCOPY;  Surgeon: Edil Wong MD;  Location: [facility] ENDO (4TH FLR);  Service: Endoscopy;  Laterality: N/A;  Dialysis MWF/ labwork day of procedure  right arm aceess  per Dr. Colin pt can hold Plavix 5 days prior see note- sm  COVID test on 2/6/21 at [facility] -     COLONOSCOPY N/A 2/10/2021    Procedure: COLONOSCOPY;  Surgeon: Robert Ayers MD;  Location: [facility] ENDO (4TH FLR);  Service: Endoscopy;  Laterality: N/A;  rescheduled due to poor bowel prep-BB  negative covid screening 2/6/21-BB  dialysis M-W-F-BB  okay to r/s for 2/9/21 and to hold Plavix per Dr. KIRAN Wong-BB  labs same day-BB    CORONARY STENT PLACEMENT N/A 7/25/2019    Procedure: INSERTION, STENT, CORONARY ARTERY;   Surgeon: Miki Zendejas MD;  Location: [facility] CATH LAB;  Service: Cardiology;  Laterality: N/A;    DIALYSIS FISTULA CREATION      FISTULOGRAM N/A 2/18/2019    Procedure: Fistulogram;  Surgeon: Yaneth De La Cruz MD;  Location: [facility] CATH LAB;  Service: Cardiology;  Laterality: N/A;    FISTULOGRAM Right 7/23/2019    Procedure: Fistulogram;  Surgeon: Oz Cordoba MD;  Location: [facility] CATH LAB;  Service: Cardiology;  Laterality: Right;    LAPAROSCOPIC ROBOT-ASSISTED SURGICAL REMOVAL OF KIDNEY USING DA JAIME XI Right 5/19/2022    Procedure: XI ROBOTIC NEPHRECTOMY;  Surgeon: Aidan Hendricks MD;  Location: [facility] OR [facility] FLR;  Service: Urology;  Laterality: Right;  3hrs    LAPAROSCOPIC ROBOT-ASSISTED SURGICAL REMOVAL OF KIDNEY USING DA JAIME XI Left 1/5/2023    Procedure: XI ROBOTIC NEPHRECTOMY;  Surgeon: Aidan Hendricks MD;  Location: [facility] OR [facility] FLR;  Service: Urology;  Laterality: Left;  4 hrs    LEFT HEART CATHETERIZATION Left 7/25/2019    Procedure: Left heart cath;  Surgeon: Miki Zendejas MD;  Location: [facility] CATH LAB;  Service: Cardiology;  Laterality: Left;    REPAIR  1/5/2023    Procedure: REPAIR; COLOTOMY;  Surgeon: Maikel Lamb MD;  Location: [facility] OR [facility] FLR;  Service: General;;    RETINAL LASER PROCEDURE Bilateral 2018 or 2017    Dr. Rothman    VASCULAR SURGERY          Family History   Problem Relation Age of Onset    Hypertension Mother     Diabetes Mother     Cataracts Mother     Hypertension Father     Hypertension Sister     Kidney disease Brother     Hypertension Brother     Heart disease Brother     Cancer Maternal Grandfather         Colon CA    Colon cancer Neg Hx     Esophageal cancer Neg Hx     Stomach cancer Neg Hx     Rectal cancer Neg Hx     Ulcerative colitis Neg Hx     Irritable bowel syndrome Neg Hx     Crohn's disease Neg Hx     Celiac disease Neg Hx     Glaucoma Neg Hx     Macular degeneration Neg Hx         Social History     Socioeconomic History    Marital status: Single    Occupational History     Employer: [employer]   Tobacco Use    Smoking status: Never    Smokeless tobacco: Never   Substance and Sexual Activity    Alcohol use: Not Currently     Comment: One drink a month    Drug use: No    Sexual activity: Yes     Partners: Female     Birth control/protection: None   Social History Narrative    Disabled    Was     One son          Social Determinants of Health     Financial Resource Strain: Low Risk  (11/28/2023)    Overall Financial Resource Strain (CARDIA)     Difficulty of Paying Living Expenses: Not hard at all   Food Insecurity: No Food Insecurity (11/28/2023)    Hunger Vital Sign     Worried About Running Out of Food in the Last Year: Never true     Ran Out of Food in the Last Year: Never true   Transportation Needs: No Transportation Needs (11/28/2023)    PRAPARE - Transportation     Lack of Transportation (Medical): No     Lack of Transportation (Non-Medical): No   Physical Activity: Insufficiently Active (5/2/2023)    Exercise Vital Sign     Days of Exercise per Week: 3 days     Minutes of Exercise per Session: 30 min   Stress: No Stress Concern Present (5/2/2023)    [employer] - Occupational Stress Questionnaire     Feeling of Stress : Not at all   Social Connections: Socially Isolated (11/28/2023)    Social Connection and Isolation Panel [NHANES]     Frequency of Communication with Friends and Family: Once a week     Frequency of Social Gatherings with Friends and Family: Once a week     Attends Confucianist Services: More than 4 times per year     Active Member of Clubs or Organizations: No     Attends Club or Organization Meetings: Never     Marital Status: Never    Housing Stability: Low Risk  (11/28/2023)    Housing Stability Vital Sign     Unable to Pay for Housing in the Last Year: No     Number of Places Lived in the Last Year: 1     Unstable Housing in the Last Year: No         Current Outpatient Medications:      aspirin (ECOTRIN) 81 MG EC tablet, Take 1 tablet (81 mg total) by mouth once daily., Disp: 30 tablet, Rfl: 11    atorvastatin (LIPITOR) 80 MG tablet, Take 1 tablet (80 mg total) by mouth once daily., Disp: 90 tablet, Rfl: 3    carvediloL (COREG) 25 MG tablet, Take 2 tablets (50 mg total) by mouth 2 (two) times daily with meals., Disp: 360 tablet, Rfl: 3    ciclopirox (PENLAC) 8 % Soln, Apply topically nightly., Disp: 6.6 mL, Rfl: 3    cinacalcet (SENSIPAR) 90 MG Tab, Take 1 tablet by mouth once daily., Disp: , Rfl:     clopidogreL (PLAVIX) 75 mg tablet, Take 1 tablet (75 mg total) by mouth once daily., Disp: 90 tablet, Rfl: 3    ezetimibe (ZETIA) 10 mg tablet, Take 1 tablet (10 mg total) by mouth once daily., Disp: 90 tablet, Rfl: 3    NIFEdipine (PROCARDIA-XL) 60 MG (OSM) 24 hr tablet, Take 1 tablet (60 mg total) by mouth once daily., Disp: 90 tablet, Rfl: 3    polyethylene glycol (GLYCOLAX) 17 gram/dose powder, Use cap to measure 17 g, then mix in liquid and drink by mouth once daily., Disp: 238 g, Rfl: 0    sevelamer carbonate (RENVELA) 800 mg Tab, Take 800 mg by mouth 3 (three) times daily with meals., Disp: , Rfl:     hydrALAZINE (APRESOLINE) 50 MG tablet, Take 1 tablet (50 mg total) by mouth once daily. for SBP > 140mm HG, Disp: 90 tablet, Rfl: 3  No current facility-administered medications for this visit.    Facility-Administered Medications Ordered in Other Visits:     lidocaine (PF) 10 mg/ml (1%) injection 10 mg, 1 mL, Intradermal, Once, Michelle Spence MD     Review of Systems   Constitutional:  Negative for malaise/fatigue.   HENT:  Negative for congestion and sore throat.    Eyes:  Negative for blurred vision.   Respiratory:  Negative for cough and shortness of breath.    Cardiovascular:  Negative for chest pain and leg swelling.   Gastrointestinal:  Negative for abdominal pain, constipation, diarrhea and nausea.   Musculoskeletal:  Negative for joint pain.   Neurological:  Negative for  headaches.   Psychiatric/Behavioral:  Negative for depression. The patient is not nervous/anxious.         Objective:      Vitals:    12/12/23 0945   BP: 116/71   Pulse: 68   Resp: 18   Temp: 99 °F (37.2 °C)       Physical Exam  Vitals and nursing note reviewed.   Constitutional:       Appearance: Normal appearance.   HENT:      Head: Normocephalic and atraumatic.   Cardiovascular:      Rate and Rhythm: Normal rate and regular rhythm.   Pulmonary:      Effort: Pulmonary effort is normal.      Breath sounds: Normal breath sounds. No wheezing or rales.   Abdominal:      General: Bowel sounds are normal.      Palpations: Abdomen is soft.      Tenderness: There is no abdominal tenderness.   Musculoskeletal:      Right lower leg: No edema.      Left lower leg: No edema.   Skin:     General: Skin is warm and dry.      Comments: Fistula on right arm normal   Neurological:      General: No focal deficit present.      Mental Status: He is alert and oriented to person, place, and time.   Psychiatric:         Mood and Affect: Mood normal.         Behavior: Behavior normal.          Assessment:       1. Controlled type 2 diabetes mellitus with chronic kidney disease on chronic dialysis, without long-term current use of insulin    2. NSTEMI (non-ST elevated myocardial infarction)    3. Coronary artery disease involving native coronary artery of native heart without angina pectoris    4. Essential hypertension    5. End stage renal failure on dialysis    6. Patient on waiting list for kidney transplant          Plan:   1. Controlled type 2 diabetes mellitus with chronic kidney disease on chronic dialysis, without long-term current use of insulin  -     Ambulatory referral/consult to Ophthalmology; Future; Expected date: 12/19/2023    2. NSTEMI (non-ST elevated myocardial infarction)  Assessment & Plan:  Resolved. Refer to HPI     - plan as mentioned under CAD  - DAPT for one year    Orders:  -     Ambulatory referral/consult to  Internal Medicine    3. Coronary artery disease involving native coronary artery of native heart without angina pectoris  Assessment & Plan:  Chronic, stable. Recent NSTEMI. Refer to HPI   BP stable     - DAPT for one year  - one lipitor 60mg and ezetimibe   - coreg 50mg BID       4. Essential hypertension  Assessment & Plan:  Chronic, controlled. Taking hydralazine OD     - continue hydralazine OD, nifedipine and coreg   - encouraged patient to measure BP everyday and let me know if it goes up       5. End stage renal failure on dialysis  Assessment & Plan:  Chronic, stable. Doing well on dialysis. Refer to HPI     - asked patient to talk to nephrologist about need to start EPO  - reminded him to take sevelamer and the importance of if   - patient educated about importance of fluid restriction       6. Patient on waiting list for kidney transplant  Assessment & Plan:  Was told that he needed colonoscopy before transplant. His last one was in 2021 and had polyps. Due for repeat only in 2026.     - asked patient to clarify the reasoning with his transplant doctor       Other orders  -     hydrALAZINE (APRESOLINE) 50 MG tablet; Take 1 tablet (50 mg total) by mouth once daily. for SBP > 140mm HG  Dispense: 90 tablet; Refill: 3       Had flu shot done already at dialysis, will find out about the dates  Follow up in about 6 months (around 6/12/2024) for CAD, ESRD.

## 2023-12-12 NOTE — PATIENT INSTRUCTIONS
Ask nephrologist about erythropoietin   Ask transplant doctor why you need a colonoscopy before transplant? Next one due in 2026

## 2023-12-13 ENCOUNTER — TELEPHONE (OUTPATIENT)
Dept: FAMILY MEDICINE | Facility: CLINIC | Age: 48
End: 2023-12-13
Payer: MEDICARE

## 2023-12-13 ENCOUNTER — TELEPHONE (OUTPATIENT)
Dept: CARDIAC REHAB | Facility: CLINIC | Age: 48
End: 2023-12-13
Payer: MEDICARE

## 2023-12-13 NOTE — TELEPHONE ENCOUNTER
Keely castro/ [facility] labs were just drawn on pt his hemoglobin 8.6 any advise please contact.

## 2023-12-13 NOTE — TELEPHONE ENCOUNTER
----- Message from Adal Padilla sent at 12/13/2023  1:03 PM CST -----  Regarding: [facility]  [phone]  Type: Patient Call Back    Who called: [facility]    What is the request in detail: called to ask some questions for the pt. Would like a call back.     Can the clinic reply by [facility]? No     Would the patient rather a call back or a response via [facility]? Call back     Best call back number: [phone]    Additional Information:    Thank you.

## 2023-12-14 ENCOUNTER — TELEPHONE (OUTPATIENT)
Dept: CARDIAC REHAB | Facility: CLINIC | Age: 48
End: 2023-12-14
Payer: MEDICARE

## 2023-12-14 DIAGNOSIS — N18.6 TYPE 2 DIABETES MELLITUS WITH END-STAGE RENAL DISEASE: Primary | ICD-10-CM

## 2023-12-14 DIAGNOSIS — I21.4 ACUTE MYOCARDIAL INFARCTION, SUBENDOCARDIAL INFARCTION, INITIAL EPISODE OF CARE: ICD-10-CM

## 2023-12-14 DIAGNOSIS — E11.22 TYPE 2 DIABETES MELLITUS WITH END-STAGE RENAL DISEASE: Primary | ICD-10-CM

## 2023-12-14 DIAGNOSIS — E78.00 PURE HYPERCHOLESTEROLEMIA: ICD-10-CM

## 2024-01-12 ENCOUNTER — TELEPHONE (OUTPATIENT)
Dept: FAMILY MEDICINE | Facility: CLINIC | Age: 49
End: 2024-01-12
Payer: MEDICARE

## 2024-01-12 NOTE — TELEPHONE ENCOUNTER
----- Message from Charla Vera sent at 1/12/2024  3:15 PM CST -----  Type:  Patient Returning Call    Who Called: [facility] Wangdaizhijia  Who Left Message for Patient:  Does the patient know what this is regarding?:  Would the patient rather a call back or a response via [facility]? Call  Best Call Back Number: [phone]  Additional Information: Pt would like to send a completed Comprehensive Medication Review.  Will need to get fax number to send documentation

## 2024-01-26 ENCOUNTER — TELEPHONE (OUTPATIENT)
Dept: UROLOGY | Facility: CLINIC | Age: 49
End: 2024-01-26
Payer: MEDICARE

## 2024-01-26 NOTE — TELEPHONE ENCOUNTER
I spoke with patient Haroldo now.  Patient is aware that MRI abdomen and chest x-ray are both scheduled for Thursday February 8, 2024 with follow-up appointment to review imaging with Catrina Zendejas NP on February 14, 2024.  We will place appointment slips in the mail so that patient may arrange transportation.  Patient verbalized understanding to all.  Thank you.

## 2024-02-08 ENCOUNTER — HOSPITAL ENCOUNTER (OUTPATIENT)
Dept: RADIOLOGY | Facility: HOSPITAL | Age: 49
Discharge: HOME | End: 2024-02-08
Attending: UROLOGY
Payer: MEDICARE

## 2024-02-08 DIAGNOSIS — C64.2 RENAL CELL CARCINOMA OF LEFT KIDNEY: ICD-10-CM

## 2024-02-08 PROCEDURE — 74181 MRI ABDOMEN W/O CONTRAST: CPT | Mod: TC

## 2024-02-08 PROCEDURE — 71046 X-RAY EXAM CHEST 2 VIEWS: CPT | Mod: 26,,, | Performed by: RADIOLOGY

## 2024-02-08 PROCEDURE — 71046 X-RAY EXAM CHEST 2 VIEWS: CPT | Mod: TC

## 2024-02-08 PROCEDURE — 74181 MRI ABDOMEN W/O CONTRAST: CPT | Mod: 26,,, | Performed by: STUDENT IN AN ORGANIZED HEALTH CARE EDUCATION/TRAINING PROGRAM

## 2024-02-15 ENCOUNTER — OFFICE VISIT (OUTPATIENT)
Dept: UROLOGY | Facility: CLINIC | Age: 49
End: 2024-02-15
Payer: MEDICARE

## 2024-02-15 VITALS
BODY MASS INDEX: 25.13 KG/M2 | HEIGHT: 67 IN | HEART RATE: 65 BPM | WEIGHT: 160.13 LBS | SYSTOLIC BLOOD PRESSURE: 148 MMHG | DIASTOLIC BLOOD PRESSURE: 76 MMHG

## 2024-02-15 DIAGNOSIS — Z90.5 HISTORY OF RIGHT NEPHRECTOMY: ICD-10-CM

## 2024-02-15 DIAGNOSIS — N52.1 TYPE 2 DIABETES MELLITUS WITH CIRCULATORY DISORDER CAUSING ERECTILE DYSFUNCTION: ICD-10-CM

## 2024-02-15 DIAGNOSIS — N18.6 ESRD ON HEMODIALYSIS: ICD-10-CM

## 2024-02-15 DIAGNOSIS — Z85.528 PERSONAL HISTORY OF RENAL CANCER: Primary | ICD-10-CM

## 2024-02-15 DIAGNOSIS — Z99.2 ESRD ON HEMODIALYSIS: ICD-10-CM

## 2024-02-15 DIAGNOSIS — Z90.5 HISTORY OF LEFT NEPHRECTOMY: ICD-10-CM

## 2024-02-15 DIAGNOSIS — E11.59 TYPE 2 DIABETES MELLITUS WITH CIRCULATORY DISORDER CAUSING ERECTILE DYSFUNCTION: ICD-10-CM

## 2024-02-15 DIAGNOSIS — N52.01 ERECTILE DYSFUNCTION DUE TO ARTERIAL INSUFFICIENCY: ICD-10-CM

## 2024-02-15 DIAGNOSIS — R34 ANURIA: ICD-10-CM

## 2024-02-15 PROCEDURE — 3078F DIAST BP <80 MM HG: CPT | Mod: CPTII,S$GLB,, | Performed by: NURSE PRACTITIONER

## 2024-02-15 PROCEDURE — 3077F SYST BP >= 140 MM HG: CPT | Mod: CPTII,S$GLB,, | Performed by: NURSE PRACTITIONER

## 2024-02-15 PROCEDURE — 99215 OFFICE O/P EST HI 40 MIN: CPT | Mod: S$GLB,,, | Performed by: NURSE PRACTITIONER

## 2024-02-15 PROCEDURE — 1159F MED LIST DOCD IN RCRD: CPT | Mod: CPTII,S$GLB,, | Performed by: NURSE PRACTITIONER

## 2024-02-15 PROCEDURE — 99999 PR PBB SHADOW E&M-EST. PATIENT-LVL III: CPT | Mod: PBBFAC,,, | Performed by: NURSE PRACTITIONER

## 2024-02-15 PROCEDURE — 1160F RVW MEDS BY RX/DR IN RCRD: CPT | Mod: CPTII,S$GLB,, | Performed by: NURSE PRACTITIONER

## 2024-02-15 PROCEDURE — 3008F BODY MASS INDEX DOCD: CPT | Mod: CPTII,S$GLB,, | Performed by: NURSE PRACTITIONER

## 2024-02-15 RX ORDER — SILDENAFIL 100 MG/1
100 TABLET, FILM COATED ORAL DAILY PRN
Qty: 6 TABLET | Refills: 12 | Status: ON HOLD | OUTPATIENT
Start: 2024-02-15 | End: 2024-03-13 | Stop reason: HOSPADM

## 2024-02-15 NOTE — PROGRESS NOTES
CHIEF COMPLAINT:    Haroldo Dickinson is a 48 y.o. male presents today for Renal Cancer.    HISTORY OF PRESENTING ILLINESS:    Haroldo Dickinson is a 48 y.o. Type 2 DIABETIC male with ESRD on HD. He is an established patient in our clinic. He was initially seen by Dr. Hendricks for renal cysts during an transplant workup.   05/19/2022 s/p Right Robotic Nephrectomy.  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   - Acquired cystic disease-associated renal cell carcinoma   - G3, (-) margins, pT: mpT1b,     Last clinic visit was 02/14/2023 with Dr. Hendricks.    02/08/2024 MRI of Abdomen:  - Postsurgical changes of bilateral nephrectomy.   - No discrete mass or organized collection in the nephrectomy beds, allowing for limited evaluation on noncontrast exam     02/08/2024 CXR:   - clear.     Here today for annual f/u visit.  Reports doing well. No issues with HD.   Does report ED >  1 year. Interested in improvement.       This is a new patient to for me. I personally reviewed their recent medical records as well as their outside medical, surgical, family, & social history.         REVIEW OF SYSTEMS:  Review of Systems   Constitutional: Negative.  Negative for chills and fever.   Eyes:  Negative for double vision.   Respiratory:  Negative for cough and shortness of breath.    Cardiovascular:  Negative for chest pain.   Gastrointestinal:  Negative for abdominal pain, constipation, diarrhea, nausea and vomiting.   Genitourinary: Negative.         Anuric  HD x 3 for past 11 years     Neurological:  Negative for dizziness and seizures.   Endo/Heme/Allergies:  Negative for polydipsia.         PATIENT HISTORY:    Past Medical History:   Diagnosis Date    CAD (coronary artery disease), native coronary artery 11/21/2019    Cataract     Diabetes mellitus     Diabetic retinopathy     Dialysis patient     DM type 2 causing renal disease, not at  goal     ESRD (end stage renal disease) started dialysis 01/2014 06/05/2014    Hyperparathyroidism, secondary renal 06/05/2014    Hypertension     NSTEMI (non-ST elevated myocardial infarction) 12/21/2013    Organ transplant candidate 06/05/2014    Pneumonia     Renal cell carcinoma of right kidney     Renal hypertension     Stroke        Past Surgical History:   Procedure Laterality Date    ANGIOGRAM, CORONARY, WITH LEFT HEART CATHETERIZATION N/A 7/1/2021    Procedure: Angiogram, Coronary, with Left Heart Cath;  Surgeon: Miki Zendejas MD;  [place] [facility];  Service: Cardiology;  Laterality: N/A;    ANGIOGRAM, CORONARY, WITH LEFT HEART CATHETERIZATION N/A 11/28/2023    Procedure: Angiogram, Coronary, with Left Heart Cath;  Surgeon: Noah Penldeton MD;  [place] [facility];  Service: Cardiology;  Laterality: N/A;    COLONOSCOPY N/A 5/3/2017    Procedure: COLONOSCOPY;  Surgeon: Rufino Carpenter MD;  Location: [facility] (4TH FLR);  Service: Endoscopy;  Laterality: N/A;  pt states can only schedule on Wednesdays    COLONOSCOPY N/A 2/9/2021    Procedure: COLONOSCOPY;  Surgeon: Edil Wong MD;  [place] [facility] (4TH FLR);  Service: Endoscopy;  Laterality: N/A;  Dialysis MWF/ labwork day of procedure  right arm aceess  per Dr. Colin pt can hold Plavix 5 days prior see note- sm  COVID test on 2/6/21 at [facility] -     COLONOSCOPY N/A 2/10/2021    Procedure: COLONOSCOPY;  Surgeon: Robert Ayers MD;  Location: [facility] (4TH FLR);  Service: Endoscopy;  Laterality: N/A;  rescheduled due to poor bowel prep-BB  negative covid screening 2/6/21-BB  dialysis M-W-F-BB  okay to r/s for 2/9/21 and to hold Plavix per Dr. KIRAN Wong-BB  labs same day-BB    CORONARY STENT PLACEMENT N/A 7/25/2019    Procedure: INSERTION, STENT, CORONARY ARTERY;  Surgeon: Miki Zendejas MD;  [place] [facility];  Service: Cardiology;  Laterality: N/A;    DIALYSIS FISTULA CREATION      FISTULOGRAM N/A 2/18/2019    Procedure:  Fistulogram;  Surgeon: Yaneth De La Cruz MD;  Location: [facility] CATH LAB;  Service: Cardiology;  Laterality: N/A;    FISTULOGRAM Right 7/23/2019    Procedure: Fistulogram;  Surgeon: Oz Cordoba MD;  Location: [facility] CATH LAB;  Service: Cardiology;  Laterality: Right;    LAPAROSCOPIC ROBOT-ASSISTED SURGICAL REMOVAL OF KIDNEY USING DA JAIME XI Right 5/19/2022    Procedure: XI ROBOTIC NEPHRECTOMY;  Surgeon: Aidan Hendricks MD;  [address]  Service: Urology;  Laterality: Right;  3hrs    LAPAROSCOPIC ROBOT-ASSISTED SURGICAL REMOVAL OF KIDNEY USING DA JAIME XI Left 1/5/2023    Procedure: XI ROBOTIC NEPHRECTOMY;  Surgeon: Aidan Hendricks MD;  [facility];  Service: Urology;  Laterality: Left;  4 hrs    LEFT HEART CATHETERIZATION Left 7/25/2019    Procedure: Left heart cath;  Surgeon: Miki Zendejas MD;  Location: [facility] CATH LAB;  Service: Cardiology;  Laterality: Left;    REPAIR  1/5/2023    Procedure: REPAIR; COLOTOMY;  Surgeon: Maikel Lamb MD;  [address];  Service: General;;    RETINAL LASER PROCEDURE Bilateral 2018 or 2017    Dr. Rothman    VASCULAR SURGERY         Family History   Problem Relation Age of Onset    Hypertension Mother     Diabetes Mother     Cataracts Mother     Hypertension Father     Hypertension Sister     Kidney disease Brother     Hypertension Brother     Heart disease Brother     Cancer Maternal Grandfather         Colon CA    Colon cancer Neg Hx     Esophageal cancer Neg Hx     Stomach cancer Neg Hx     Rectal cancer Neg Hx     Ulcerative colitis Neg Hx     Irritable bowel syndrome Neg Hx     Crohn's disease Neg Hx     Celiac disease Neg Hx     Glaucoma Neg Hx     Macular degeneration Neg Hx        Social History     Socioeconomic History    Marital status: Single   Occupational History     Employer: [employer]   Tobacco Use    Smoking status: Never    Smokeless tobacco: Never   Substance and Sexual Activity    Alcohol use: Not  Currently     Comment: One drink a month    Drug use: No    Sexual activity: Yes     Partners: Female     Birth control/protection: None   Social History Narrative    Disabled    Was     One son          Social Determinants of Health     Financial Resource Strain: Low Risk  (11/28/2023)    Overall Financial Resource Strain (CARDIA)     Difficulty of Paying Living Expenses: Not hard at all   Food Insecurity: No Food Insecurity (11/28/2023)    Hunger Vital Sign     Worried About Running Out of Food in the Last Year: Never true     Ran Out of Food in the Last Year: Never true   Transportation Needs: No Transportation Needs (11/28/2023)    PRAPARE - Transportation     Lack of Transportation (Medical): No     Lack of Transportation (Non-Medical): No   Physical Activity: Insufficiently Active (5/2/2023)    Exercise Vital Sign     Days of Exercise per Week: 3 days     Minutes of Exercise per Session: 30 min   Stress: No Stress Concern Present (5/2/2023)    [place] [employer] - Occupational Stress Questionnaire     Feeling of Stress : Not at all   Social Connections: Socially Isolated (11/28/2023)    Social Connection and Isolation Panel [NHANES]     Frequency of Communication with Friends and Family: Once a week     Frequency of Social Gatherings with Friends and Family: Once a week     Attends Holiness Services: More than 4 times per year     Active Member of Clubs or Organizations: No     Attends Club or Organization Meetings: Never     Marital Status: Never    Housing Stability: Low Risk  (11/28/2023)    Housing Stability Vital Sign     Unable to Pay for Housing in the Last Year: No     Number of Places Lived in the Last Year: 1     Unstable Housing in the Last Year: No       Allergies:  No known allergies    Medications:    Current Outpatient Medications:     aspirin (ECOTRIN) 81 MG EC tablet, Take 1 tablet (81 mg total) by mouth once daily., Disp: 30 tablet, Rfl: 11    atorvastatin  (LIPITOR) 80 MG tablet, Take 1 tablet (80 mg total) by mouth once daily., Disp: 90 tablet, Rfl: 3    carvediloL (COREG) 25 MG tablet, Take 2 tablets (50 mg total) by mouth 2 (two) times daily with meals., Disp: 360 tablet, Rfl: 3    ciclopirox (PENLAC) 8 % Soln, Apply topically nightly., Disp: 6.6 mL, Rfl: 3    cinacalcet (SENSIPAR) 90 MG Tab, Take 1 tablet by mouth once daily., Disp: , Rfl:     clopidogreL (PLAVIX) 75 mg tablet, Take 1 tablet (75 mg total) by mouth once daily., Disp: 90 tablet, Rfl: 3    ezetimibe (ZETIA) 10 mg tablet, Take 1 tablet (10 mg total) by mouth once daily., Disp: 90 tablet, Rfl: 3    hydrALAZINE (APRESOLINE) 50 MG tablet, Take 1 tablet (50 mg total) by mouth once daily. for SBP > 140mm HG, Disp: 90 tablet, Rfl: 3    NIFEdipine (PROCARDIA-XL) 60 MG (OSM) 24 hr tablet, Take 1 tablet (60 mg total) by mouth once daily., Disp: 90 tablet, Rfl: 3    polyethylene glycol (GLYCOLAX) 17 gram/dose powder, Use cap to measure 17 g, then mix in liquid and drink by mouth once daily., Disp: 238 g, Rfl: 0    sevelamer carbonate (RENVELA) 800 mg Tab, Take 800 mg by mouth 3 (three) times daily with meals., Disp: , Rfl:     sildenafiL (VIAGRA) 100 MG tablet, Take 1 tablet (100 mg total) by mouth daily as needed for Erectile Dysfunction (take on an empty stomach 30-60 minutes before)., Disp: 6 tablet, Rfl: 12  No current facility-administered medications for this visit.    Facility-Administered Medications Ordered in Other Visits:     lidocaine (PF) 10 mg/ml (1%) injection 10 mg, 1 mL, Intradermal, Once, Michelle Spence MD    PHYSICAL EXAMINATION:  Physical Exam  Vitals and nursing note reviewed.   Constitutional:       General: He is awake.      Appearance: Normal appearance.   HENT:      Head: Normocephalic.      Right Ear: External ear normal.      Left Ear: External ear normal.      Nose: Nose normal.   Cardiovascular:      Rate and Rhythm: Normal rate.   Pulmonary:      Effort: Pulmonary effort is  normal. No respiratory distress.   Abdominal:      Tenderness: There is no abdominal tenderness. There is no right CVA tenderness or left CVA tenderness.   Genitourinary:     Penis: Normal.       Testes: Normal.   Musculoskeletal:         General: Normal range of motion.      Cervical back: Normal range of motion.   Skin:     General: Skin is warm and dry.   Neurological:      General: No focal deficit present.      Mental Status: He is alert and oriented to person, place, and time.   Psychiatric:         Mood and Affect: Mood normal.         Behavior: Behavior is cooperative.           LABS:          Lab Results   Component Value Date    PSA 1.5 10/20/2022    PSA 0.95 02/06/2020    PSA 0.67 02/14/2017       Lab Results   Component Value Date    CREATININE 12.4 (H) 11/29/2023    EGFRNORACEVR 4.6 (A) 11/29/2023             IMPRESSION:    Encounter Diagnoses   Name Primary?    Type 2 diabetes mellitus with circulatory disorder causing erectile dysfunction Yes    Erectile dysfunction due to arterial insufficiency     ESRD on hemodialysis     History of right nephrectomy     History of left nephrectomy     Personal history of renal cancer     Anuria          Assessment:       1. Type 2 diabetes mellitus with circulatory disorder causing erectile dysfunction    2. Erectile dysfunction due to arterial insufficiency    3. ESRD on hemodialysis    4. History of right nephrectomy    5. History of left nephrectomy    6. Personal history of renal cancer    7. Anuria        Plan:         I spent 40 minutes with the patient of which more than half was spent in direct consultation with the patient in regards to our treatment and plan.  We addressed the office findings and recent labs and imaging  Education and recommendations of today's plan of care including home remedies and needed follow up with PCP.   We discussed the chief complaint; reviewed his ED and the possible contributory factors.   Reassurance with imaging.   We  discussed the physiological as well as his psychological aspects that contribute to ED.  Reviewed the 1st, 2nd, & 3rd line therapies for ED.  The benefits, expectations, risks, se of the different therapies.  Encouraged to take on an empty stomach 30-60 minutes prior to interaction.   Discussed 2nd line (especially ICI) would be more effective for him.   Ok to try oral agent. Not taking any nitrates. Cleared for sexual activity.   Reviewed management  Recommended lifestyle modifications with a proper, healthy DIABETI diet, good hydration but during the day. Reducing bladder irritants.   Benefits of regular exercise.  Rx for Sildenafil given.  RTC if not effective  Repeat MRI and CXR in one year.   He is UROLOGICAL CLEARED for transplant.

## 2024-03-05 ENCOUNTER — TELEPHONE (OUTPATIENT)
Dept: FAMILY MEDICINE | Facility: CLINIC | Age: 49
End: 2024-03-05
Payer: MEDICARE

## 2024-03-05 NOTE — TELEPHONE ENCOUNTER
----- Message from Carlos Swanson sent at 3/5/2024 10:06 AM CST -----  Name Of Caller:Rosina Gallagher         Provider Name: Mireya Larose        Does patient feel the need to be seen today? no        Relationship to the Pt?:Rosina with [employer]         Contact Preference?:[phone]        What is the nature of the call?:Rosina with [employer] states that she needs to speak with someone in the office to notify the office about the patient's blood pressure, 158/90  and 142/79.

## 2024-03-05 NOTE — TELEPHONE ENCOUNTER
Left voicemail for patient to return call to clinic. This was not a call from [email or web address]. Call came from [facility]. The nurse was unable to take the call. Please advise.

## 2024-03-06 ENCOUNTER — PATIENT OUTREACH (OUTPATIENT)
Dept: ADMINISTRATIVE | Facility: HOSPITAL | Age: 49
End: 2024-03-06
Payer: MEDICARE

## 2024-03-06 DIAGNOSIS — E11.22 CONTROLLED TYPE 2 DIABETES MELLITUS WITH CHRONIC KIDNEY DISEASE ON CHRONIC DIALYSIS, WITHOUT LONG-TERM CURRENT USE OF INSULIN: Primary | ICD-10-CM

## 2024-03-06 DIAGNOSIS — Z99.2 CONTROLLED TYPE 2 DIABETES MELLITUS WITH CHRONIC KIDNEY DISEASE ON CHRONIC DIALYSIS, WITHOUT LONG-TERM CURRENT USE OF INSULIN: Primary | ICD-10-CM

## 2024-03-06 DIAGNOSIS — N18.6 CONTROLLED TYPE 2 DIABETES MELLITUS WITH CHRONIC KIDNEY DISEASE ON CHRONIC DIALYSIS, WITHOUT LONG-TERM CURRENT USE OF INSULIN: Primary | ICD-10-CM

## 2024-03-06 NOTE — PROGRESS NOTES
Population Health Chart Review & Patient Outreach Details      Further Action Needed If Patient Returns Outreach:      Due for eye visit with doctor. Patient stated will call back when ready. Orders placed.       Updates Requested / Reviewed:     [x]  Care Everywhere    []     []  External Sources ([facility], [facility], [facility], etc.)    [] [facility]   [] [facility]   [] Other:    [x]  Care Team Updated   []  Removed  or Duplicate Orders   []  Immunization Reconciliation Completed / Queried    [] [place]   [] [place]   [] [place]   [] [place]      Health Maintenance Topics Addressed and Outreach Outcomes / Actions Taken:             Breast Cancer Screening []  Mammogram Order Placed    []  Mammogram Screening Scheduled    []  External Records Requested & Care Team Updated if Applicable    []  External Records Uploaded & Care Team Updated if Applicable    []  Pt Declined Scheduling Mammogram    []  Pt Will Schedule with External Provider / Order Routed & Care Team Updated if Applicable              Cervical Cancer Screening []  Pap Smear Scheduled in Primary Care or OBGYN    []  External Records Requested & Care Team Updated if Applicable       []  External Records Uploaded, Care Team Updated, & History Updated if Applicable    []  Patient Declined Scheduling Pap Smear    []  Patient Will Schedule with External Provider & Care Team Updated if Applicable                  Colorectal Cancer Screening []  Colonoscopy Case Request / Referral / Home Test Order Placed    []  External Records Requested & Care Team Updated if Applicable    []  External Records Uploaded, Care Team Updated, & History Updated if Applicable    []  Patient Declined Completing Colon Cancer Screening    []  Patient Will Schedule with External Provider & Care Team Updated if Applicable    []  Fit Kit Mailed (add the SmartPhrase under additional notes)    []  Reminded Patient to Complete Home Test                Diabetic Eye Exam []  Eye Exam  Screening Order Placed    [x]  Eye Camera Scheduled or Optometry/Ophthalmology Referral Placed    []  External Records Requested & Care Team Updated if Applicable    []  External Records Uploaded, Care Team Updated, & History Updated if Applicable    []  Patient Declined Scheduling Eye Exam    []  Patient Will Schedule with External Provider & Care Team Updated if Applicable             Blood Pressure Control []  Primary Care Follow Up Visit Scheduled     []  Remote Blood Pressure Reading Captured    []  Patient Declined Remote Reading or Scheduling Appt - Escalated to PCP    []  Patient Will Call Back or Send Portal Message with Reading                 HbA1c & Other Labs []  Overdue Lab(s) Ordered    []  Overdue Lab(s) Scheduled    []  External Records Uploaded & Care Team Updated if Applicable    []  Primary Care Follow Up Visit Scheduled     []  Reminded Patient to Complete A1c Home Test    []  Patient Declined Scheduling Labs or Will Call Back to Schedule    []  Patient Will Schedule with External Provider / Order Routed, & Care Team Updated if Applicable           Primary Care Appointment []  Primary Care Appt Scheduled    []  Patient Declined Scheduling or Will Call Back to Schedule    []  Pt Established with External Provider, Updated Care Team, & Informed Pt to Notify Payor if Applicable           Medication Adherence /    Statin Use []  Primary Care Appointment Scheduled    []  Patient Reminded to  Prescription    []  Patient Declined, Provider Notified if Needed    []  Sent Provider Message to Review to Evaluate Pt for Statin, Add Exclusion Dx Codes, Document   Exclusion in Problem List, Change Statin Intensity Level to Moderate or High Intensity if Applicable                Osteoporosis Screening []  Dexa Order Placed    []  Dexa Appointment Scheduled    []  External Records Requested & Care Team Updated    []  External Records Uploaded, Care Team Updated, & History Updated if Applicable    []   Patient Declined Scheduling Dexa or Will Call Back to Schedule    []  Patient Will Schedule with External Provider / Order Routed & Care Team Updated if Applicable       Additional Notes:

## 2024-03-07 ENCOUNTER — TELEPHONE (OUTPATIENT)
Dept: ENDOSCOPY | Facility: HOSPITAL | Age: 49
End: 2024-03-07
Payer: MEDICARE

## 2024-03-07 NOTE — TELEPHONE ENCOUNTER
----- Message from Josie Montoya sent at 3/7/2024  8:24 AM CST -----  Regarding: FW: Referral    ----- Message -----  From: Judy Toussaint  Sent: 3/6/2024   4:42 PM CST  To: [facility]  Subject: Referral                                         Good afternoon,   Provider Justino Rogers would like to refer the following patient to [facility] for a colonoscopy. I have a scanned the following patient's referral and records into .     Please review and contact patient to schedule appointment.   Thank you,   Judy   [facility] Jose Enrique

## 2024-03-07 NOTE — TELEPHONE ENCOUNTER
Ma spoke with pt   Pt declined to schedule colonoscopy , Pt states dr Rogers is not his provider anymore.

## 2024-03-10 ENCOUNTER — HOSPITAL ENCOUNTER (INPATIENT)
Facility: HOSPITAL | Age: 49
LOS: 2 days | Discharge: HOME | DRG: 302 | End: 2024-03-13
Attending: EMERGENCY MEDICINE | Admitting: STUDENT IN AN ORGANIZED HEALTH CARE EDUCATION/TRAINING PROGRAM
Payer: MEDICARE

## 2024-03-10 DIAGNOSIS — I21.4 NSTEMI (NON-ST ELEVATED MYOCARDIAL INFARCTION): ICD-10-CM

## 2024-03-10 DIAGNOSIS — I24.9 ACUTE CORONARY SYNDROME WITH HIGH TROPONIN: Primary | ICD-10-CM

## 2024-03-10 DIAGNOSIS — R07.9 CHEST PAIN, RULE OUT ACUTE MYOCARDIAL INFARCTION: ICD-10-CM

## 2024-03-10 DIAGNOSIS — R79.89 ELEVATED BRAIN NATRIURETIC PEPTIDE (BNP) LEVEL: ICD-10-CM

## 2024-03-10 DIAGNOSIS — R07.9 CHEST PAIN: ICD-10-CM

## 2024-03-10 DIAGNOSIS — I24.9 ACS (ACUTE CORONARY SYNDROME): ICD-10-CM

## 2024-03-10 PROBLEM — Z99.2 TYPE 2 DIABETES MELLITUS WITH CHRONIC KIDNEY DISEASE ON CHRONIC DIALYSIS, WITHOUT LONG-TERM CURRENT USE OF INSULIN: Status: ACTIVE | Noted: 2024-03-10

## 2024-03-10 PROBLEM — I50.32 CHRONIC DIASTOLIC HEART FAILURE: Status: ACTIVE | Noted: 2023-05-02

## 2024-03-10 PROBLEM — N18.6 TYPE 2 DIABETES MELLITUS WITH CHRONIC KIDNEY DISEASE ON CHRONIC DIALYSIS, WITHOUT LONG-TERM CURRENT USE OF INSULIN: Status: ACTIVE | Noted: 2024-03-10

## 2024-03-10 PROBLEM — E11.22 TYPE 2 DIABETES MELLITUS WITH CHRONIC KIDNEY DISEASE ON CHRONIC DIALYSIS, WITHOUT LONG-TERM CURRENT USE OF INSULIN: Status: ACTIVE | Noted: 2024-03-10

## 2024-03-10 PROBLEM — E11.69 HYPERLIPIDEMIA ASSOCIATED WITH TYPE 2 DIABETES MELLITUS: Status: ACTIVE | Noted: 2019-11-21

## 2024-03-10 PROBLEM — I25.110 CORONARY ARTERY DISEASE INVOLVING NATIVE CORONARY ARTERY OF NATIVE HEART WITH UNSTABLE ANGINA PECTORIS: Status: ACTIVE | Noted: 2019-11-21

## 2024-03-10 PROBLEM — I73.9 PAD (PERIPHERAL ARTERY DISEASE): Status: ACTIVE | Noted: 2024-03-10

## 2024-03-10 PROBLEM — E78.5 HYPERLIPIDEMIA ASSOCIATED WITH TYPE 2 DIABETES MELLITUS: Status: ACTIVE | Noted: 2019-11-21

## 2024-03-10 LAB
ALBUMIN SERPL BCP-MCNC: 3.4 G/DL (ref 3.5–5.2)
ALP SERPL-CCNC: 81 U/L (ref 55–135)
ALT SERPL W/O P-5'-P-CCNC: 24 U/L (ref 10–44)
ANION GAP SERPL CALC-SCNC: 8 MMOL/L (ref 8–16)
ASCENDING AORTA: 3.04 CM
AST SERPL-CCNC: 20 U/L (ref 10–40)
AV INDEX (PROSTH): 0.63
AV MEAN GRADIENT: 10 MMHG
AV PEAK GRADIENT: 20 MMHG
AV VALVE AREA BY VELOCITY RATIO: 1.93 CM²
AV VALVE AREA: 2.24 CM²
AV VELOCITY RATIO: 0.54
BASOPHILS # BLD AUTO: 0.13 K/UL (ref 0–0.2)
BASOPHILS NFR BLD: 1.3 % (ref 0–1.9)
BILIRUB SERPL-MCNC: 0.6 MG/DL (ref 0.1–1)
BNP SERPL-MCNC: 194 PG/ML (ref 0–99)
BSA FOR ECHO PROCEDURE: 1.85 M2
BUN SERPL-MCNC: 28 MG/DL (ref 6–20)
CALCIUM SERPL-MCNC: 9.1 MG/DL (ref 8.7–10.5)
CHLORIDE SERPL-SCNC: 106 MMOL/L (ref 95–110)
CO2 SERPL-SCNC: 24 MMOL/L (ref 23–29)
CREAT SERPL-MCNC: 8.8 MG/DL (ref 0.5–1.4)
CV ECHO LV RWT: 0.44 CM
DIFFERENTIAL METHOD BLD: ABNORMAL
DOP CALC AO PEAK VEL: 2.21 M/S
DOP CALC AO VTI: 50.54 CM
DOP CALC LVOT AREA: 3.6 CM2
DOP CALC LVOT DIAMETER: 2.13 CM
DOP CALC LVOT PEAK VEL: 1.2 M/S
DOP CALC LVOT STROKE VOLUME: 113.22 CM3
DOP CALCLVOT PEAK VEL VTI: 31.79 CM
E WAVE DECELERATION TIME: 195.6 MSEC
E/A RATIO: 1.12
E/E' RATIO: 11.86 M/S
ECHO LV POSTERIOR WALL: 1.2 CM (ref 0.6–1.1)
EOSINOPHIL # BLD AUTO: 0.6 K/UL (ref 0–0.5)
EOSINOPHIL NFR BLD: 5.9 % (ref 0–8)
ERYTHROCYTE [DISTWIDTH] IN BLOOD BY AUTOMATED COUNT: 15.8 % (ref 11.5–14.5)
EST. GFR  (NO RACE VARIABLE): 6.9 ML/MIN/1.73 M^2
FRACTIONAL SHORTENING: 33 % (ref 28–44)
GLUCOSE SERPL-MCNC: 123 MG/DL (ref 70–110)
HCT VFR BLD AUTO: 30.6 % (ref 40–54)
HCV AB SERPL QL IA: NORMAL
HGB BLD-MCNC: 9.6 G/DL (ref 14–18)
HIV 1+2 AB+HIV1 P24 AG SERPL QL IA: NORMAL
IMM GRANULOCYTES # BLD AUTO: 0.04 K/UL (ref 0–0.04)
IMM GRANULOCYTES NFR BLD AUTO: 0.4 % (ref 0–0.5)
INTERVENTRICULAR SEPTUM: 1.1 CM (ref 0.6–1.1)
IVRT: 122.74 MSEC
LA MAJOR: 6.06 CM
LA WIDTH: 4.76 CM
LEFT ATRIUM SIZE: 4.23 CM
LEFT ATRIUM VOLUME INDEX MOD: 35.6 ML/M2
LEFT ATRIUM VOLUME MOD: 65.51 CM3
LEFT INTERNAL DIMENSION IN SYSTOLE: 3.63 CM (ref 2.1–4)
LEFT VENTRICLE DIASTOLIC VOLUME INDEX: 77.11 ML/M2
LEFT VENTRICLE DIASTOLIC VOLUME: 141.89 ML
LEFT VENTRICLE MASS INDEX: 136 G/M2
LEFT VENTRICLE SYSTOLIC VOLUME INDEX: 30.3 ML/M2
LEFT VENTRICLE SYSTOLIC VOLUME: 55.71 ML
LEFT VENTRICULAR INTERNAL DIMENSION IN DIASTOLE: 5.41 CM (ref 3.5–6)
LEFT VENTRICULAR MASS: 250.18 G
LV LATERAL E/E' RATIO: 10.38 M/S
LV SEPTAL E/E' RATIO: 13.83 M/S
LYMPHOCYTES # BLD AUTO: 1.7 K/UL (ref 1–4.8)
LYMPHOCYTES NFR BLD: 17 % (ref 18–48)
MCH RBC QN AUTO: 31.2 PG (ref 27–31)
MCHC RBC AUTO-ENTMCNC: 31.4 G/DL (ref 32–36)
MCV RBC AUTO: 99 FL (ref 82–98)
MONOCYTES # BLD AUTO: 1.2 K/UL (ref 0.3–1)
MONOCYTES NFR BLD: 11.8 % (ref 4–15)
MV PEAK A VEL: 0.74 M/S
MV PEAK E VEL: 0.83 M/S
MV STENOSIS PRESSURE HALF TIME: 56.72 MS
MV VALVE AREA P 1/2 METHOD: 3.88 CM2
NEUTROPHILS # BLD AUTO: 6.3 K/UL (ref 1.8–7.7)
NEUTROPHILS NFR BLD: 63.6 % (ref 38–73)
NRBC BLD-RTO: 0 /100 WBC
OHS LV EJECTION FRACTION SIMPSONS BIPLANE MOD: 52 %
OHS QRS DURATION: 96 MS
OHS QTC CALCULATION: 458 MS
PISA TR MAX VEL: 2.77 M/S
PLATELET # BLD AUTO: 262 K/UL (ref 150–450)
PMV BLD AUTO: 9.9 FL (ref 9.2–12.9)
POC CARDIAC TROPONIN I: 0.05 NG/ML (ref 0–0.08)
POCT GLUCOSE: 106 MG/DL (ref 70–110)
POCT GLUCOSE: 90 MG/DL (ref 70–110)
POTASSIUM SERPL-SCNC: 3.6 MMOL/L (ref 3.5–5.1)
PROT SERPL-MCNC: 7.3 G/DL (ref 6–8.4)
RA MAJOR: 5.02 CM
RA PRESSURE ESTIMATED: 3 MMHG
RA WIDTH: 4.21 CM
RBC # BLD AUTO: 3.08 M/UL (ref 4.6–6.2)
RIGHT VENTRICULAR END-DIASTOLIC DIMENSION: 3.1 CM
RV TB RVSP: 6 MMHG
SAMPLE: NORMAL
SINUS: 3.49 CM
SODIUM SERPL-SCNC: 138 MMOL/L (ref 136–145)
STJ: 2.87 CM
TDI LATERAL: 0.08 M/S
TDI SEPTAL: 0.06 M/S
TDI: 0.07 M/S
TR MAX PG: 31 MMHG
TRICUSPID ANNULAR PLANE SYSTOLIC EXCURSION: 2.03 CM
TROPONIN I SERPL DL<=0.01 NG/ML-MCNC: 0.09 NG/ML (ref 0–0.03)
TROPONIN I SERPL DL<=0.01 NG/ML-MCNC: 24.44 NG/ML (ref 0–0.03)
TV REST PULMONARY ARTERY PRESSURE: 34 MMHG
WBC # BLD AUTO: 9.98 K/UL (ref 3.9–12.7)
Z-SCORE OF LEFT VENTRICULAR DIMENSION IN END DIASTOLE: 0.58
Z-SCORE OF LEFT VENTRICULAR DIMENSION IN END SYSTOLE: 1.12

## 2024-03-10 PROCEDURE — G0378 HOSPITAL OBSERVATION PER HR: HCPCS

## 2024-03-10 PROCEDURE — 93005 ELECTROCARDIOGRAM TRACING: CPT

## 2024-03-10 PROCEDURE — 93010 ELECTROCARDIOGRAM REPORT: CPT | Mod: ,,, | Performed by: INTERNAL MEDICINE

## 2024-03-10 PROCEDURE — 63600175 PHARM REV CODE 636 W HCPCS: Performed by: STUDENT IN AN ORGANIZED HEALTH CARE EDUCATION/TRAINING PROGRAM

## 2024-03-10 PROCEDURE — 99214 OFFICE O/P EST MOD 30 MIN: CPT | Mod: ,,, | Performed by: INTERNAL MEDICINE

## 2024-03-10 PROCEDURE — 85025 COMPLETE CBC W/AUTO DIFF WBC: CPT | Performed by: EMERGENCY MEDICINE

## 2024-03-10 PROCEDURE — 36415 COLL VENOUS BLD VENIPUNCTURE: CPT | Performed by: STUDENT IN AN ORGANIZED HEALTH CARE EDUCATION/TRAINING PROGRAM

## 2024-03-10 PROCEDURE — 25000003 PHARM REV CODE 250: Performed by: STUDENT IN AN ORGANIZED HEALTH CARE EDUCATION/TRAINING PROGRAM

## 2024-03-10 PROCEDURE — 96372 THER/PROPH/DIAG INJ SC/IM: CPT | Performed by: STUDENT IN AN ORGANIZED HEALTH CARE EDUCATION/TRAINING PROGRAM

## 2024-03-10 PROCEDURE — 84484 ASSAY OF TROPONIN QUANT: CPT | Mod: 91 | Performed by: STUDENT IN AN ORGANIZED HEALTH CARE EDUCATION/TRAINING PROGRAM

## 2024-03-10 PROCEDURE — A4216 STERILE WATER/SALINE, 10 ML: HCPCS | Performed by: STUDENT IN AN ORGANIZED HEALTH CARE EDUCATION/TRAINING PROGRAM

## 2024-03-10 PROCEDURE — 86803 HEPATITIS C AB TEST: CPT | Performed by: PHYSICIAN ASSISTANT

## 2024-03-10 PROCEDURE — 99285 EMERGENCY DEPT VISIT HI MDM: CPT | Mod: 25

## 2024-03-10 PROCEDURE — 84484 ASSAY OF TROPONIN QUANT: CPT | Performed by: EMERGENCY MEDICINE

## 2024-03-10 PROCEDURE — 87389 HIV-1 AG W/HIV-1&-2 AB AG IA: CPT | Performed by: PHYSICIAN ASSISTANT

## 2024-03-10 PROCEDURE — 80053 COMPREHEN METABOLIC PANEL: CPT | Performed by: EMERGENCY MEDICINE

## 2024-03-10 PROCEDURE — 83880 ASSAY OF NATRIURETIC PEPTIDE: CPT | Performed by: EMERGENCY MEDICINE

## 2024-03-10 RX ORDER — GLUCAGON 1 MG
1 KIT INJECTION
Status: DISCONTINUED | OUTPATIENT
Start: 2024-03-10 | End: 2024-03-13 | Stop reason: HOSPADM

## 2024-03-10 RX ORDER — INSULIN ASPART 100 [IU]/ML
0-10 INJECTION, SOLUTION INTRAVENOUS; SUBCUTANEOUS
Status: DISCONTINUED | OUTPATIENT
Start: 2024-03-10 | End: 2024-03-13 | Stop reason: HOSPADM

## 2024-03-10 RX ORDER — IBUPROFEN 200 MG
16 TABLET ORAL
Status: DISCONTINUED | OUTPATIENT
Start: 2024-03-10 | End: 2024-03-13 | Stop reason: HOSPADM

## 2024-03-10 RX ORDER — ACETAMINOPHEN 325 MG/1
650 TABLET ORAL EVERY 4 HOURS PRN
Status: DISCONTINUED | OUTPATIENT
Start: 2024-03-10 | End: 2024-03-13 | Stop reason: HOSPADM

## 2024-03-10 RX ORDER — CINACALCET 30 MG/1
90 TABLET, FILM COATED ORAL DAILY
Status: DISCONTINUED | OUTPATIENT
Start: 2024-03-10 | End: 2024-03-13 | Stop reason: HOSPADM

## 2024-03-10 RX ORDER — CARVEDILOL 25 MG/1
50 TABLET ORAL 2 TIMES DAILY WITH MEALS
Status: DISCONTINUED | OUTPATIENT
Start: 2024-03-10 | End: 2024-03-13 | Stop reason: HOSPADM

## 2024-03-10 RX ORDER — CLOPIDOGREL BISULFATE 75 MG/1
75 TABLET ORAL DAILY
Status: DISCONTINUED | OUTPATIENT
Start: 2024-03-10 | End: 2024-03-13 | Stop reason: HOSPADM

## 2024-03-10 RX ORDER — HEPARIN SODIUM 1000 [USP'U]/ML
1000 INJECTION, SOLUTION INTRAVENOUS; SUBCUTANEOUS
Status: ACTIVE | OUTPATIENT
Start: 2024-03-11 | End: 2024-03-11

## 2024-03-10 RX ORDER — SIMETHICONE 80 MG
1 TABLET,CHEWABLE ORAL 4 TIMES DAILY PRN
Status: DISCONTINUED | OUTPATIENT
Start: 2024-03-10 | End: 2024-03-13 | Stop reason: HOSPADM

## 2024-03-10 RX ORDER — HYDRALAZINE HYDROCHLORIDE 50 MG/1
50 TABLET, FILM COATED ORAL DAILY
Status: DISCONTINUED | OUTPATIENT
Start: 2024-03-10 | End: 2024-03-13 | Stop reason: HOSPADM

## 2024-03-10 RX ORDER — IBUPROFEN 200 MG
24 TABLET ORAL
Status: DISCONTINUED | OUTPATIENT
Start: 2024-03-10 | End: 2024-03-13 | Stop reason: HOSPADM

## 2024-03-10 RX ORDER — ALUMINUM HYDROXIDE, MAGNESIUM HYDROXIDE, AND SIMETHICONE 1200; 120; 1200 MG/30ML; MG/30ML; MG/30ML
30 SUSPENSION ORAL 4 TIMES DAILY PRN
Status: DISCONTINUED | OUTPATIENT
Start: 2024-03-10 | End: 2024-03-13 | Stop reason: HOSPADM

## 2024-03-10 RX ORDER — NITROGLYCERIN 0.4 MG/1
0.4 TABLET SUBLINGUAL EVERY 5 MIN PRN
Status: DISCONTINUED | OUTPATIENT
Start: 2024-03-10 | End: 2024-03-13 | Stop reason: HOSPADM

## 2024-03-10 RX ORDER — POLYETHYLENE GLYCOL 3350 17 G/17G
17 POWDER, FOR SOLUTION ORAL DAILY PRN
Status: DISCONTINUED | OUTPATIENT
Start: 2024-03-10 | End: 2024-03-13 | Stop reason: HOSPADM

## 2024-03-10 RX ORDER — TALC
6 POWDER (GRAM) TOPICAL NIGHTLY PRN
Status: DISCONTINUED | OUTPATIENT
Start: 2024-03-10 | End: 2024-03-13 | Stop reason: HOSPADM

## 2024-03-10 RX ORDER — PROCHLORPERAZINE EDISYLATE 5 MG/ML
5 INJECTION INTRAMUSCULAR; INTRAVENOUS EVERY 6 HOURS PRN
Status: DISCONTINUED | OUTPATIENT
Start: 2024-03-10 | End: 2024-03-13 | Stop reason: HOSPADM

## 2024-03-10 RX ORDER — ONDANSETRON HYDROCHLORIDE 2 MG/ML
4 INJECTION, SOLUTION INTRAVENOUS EVERY 8 HOURS PRN
Status: DISCONTINUED | OUTPATIENT
Start: 2024-03-10 | End: 2024-03-13 | Stop reason: HOSPADM

## 2024-03-10 RX ORDER — SEVELAMER CARBONATE 800 MG/1
800 TABLET, FILM COATED ORAL
Status: DISCONTINUED | OUTPATIENT
Start: 2024-03-10 | End: 2024-03-13 | Stop reason: HOSPADM

## 2024-03-10 RX ORDER — NIFEDIPINE 30 MG/1
60 TABLET, EXTENDED RELEASE ORAL DAILY
Status: DISCONTINUED | OUTPATIENT
Start: 2024-03-10 | End: 2024-03-12

## 2024-03-10 RX ORDER — ASPIRIN 81 MG/1
81 TABLET ORAL DAILY
Status: DISCONTINUED | OUTPATIENT
Start: 2024-03-10 | End: 2024-03-13 | Stop reason: HOSPADM

## 2024-03-10 RX ORDER — MUPIROCIN 20 MG/G
OINTMENT TOPICAL 2 TIMES DAILY
Status: DISCONTINUED | OUTPATIENT
Start: 2024-03-11 | End: 2024-03-13 | Stop reason: HOSPADM

## 2024-03-10 RX ORDER — SODIUM CHLORIDE 0.9 % (FLUSH) 0.9 %
10 SYRINGE (ML) INJECTION EVERY 8 HOURS
Status: DISCONTINUED | OUTPATIENT
Start: 2024-03-10 | End: 2024-03-13 | Stop reason: HOSPADM

## 2024-03-10 RX ORDER — NALOXONE HCL 0.4 MG/ML
0.02 VIAL (ML) INJECTION
Status: DISCONTINUED | OUTPATIENT
Start: 2024-03-10 | End: 2024-03-13 | Stop reason: HOSPADM

## 2024-03-10 RX ORDER — EZETIMIBE 10 MG/1
10 TABLET ORAL DAILY
Status: DISCONTINUED | OUTPATIENT
Start: 2024-03-10 | End: 2024-03-13 | Stop reason: HOSPADM

## 2024-03-10 RX ORDER — IPRATROPIUM BROMIDE AND ALBUTEROL SULFATE 2.5; .5 MG/3ML; MG/3ML
3 SOLUTION RESPIRATORY (INHALATION) EVERY 6 HOURS PRN
Status: DISCONTINUED | OUTPATIENT
Start: 2024-03-10 | End: 2024-03-13 | Stop reason: HOSPADM

## 2024-03-10 RX ORDER — ATORVASTATIN CALCIUM 40 MG/1
80 TABLET, FILM COATED ORAL DAILY
Status: DISCONTINUED | OUTPATIENT
Start: 2024-03-10 | End: 2024-03-13 | Stop reason: HOSPADM

## 2024-03-10 RX ORDER — SODIUM CHLORIDE 9 MG/ML
INJECTION, SOLUTION INTRAVENOUS ONCE
Status: DISCONTINUED | OUTPATIENT
Start: 2024-03-11 | End: 2024-03-13

## 2024-03-10 RX ORDER — HYDRALAZINE HYDROCHLORIDE 20 MG/ML
10 INJECTION INTRAMUSCULAR; INTRAVENOUS EVERY 6 HOURS PRN
Status: DISCONTINUED | OUTPATIENT
Start: 2024-03-10 | End: 2024-03-13 | Stop reason: HOSPADM

## 2024-03-10 RX ORDER — HEPARIN SODIUM 5000 [USP'U]/ML
5000 INJECTION, SOLUTION INTRAVENOUS; SUBCUTANEOUS EVERY 8 HOURS
Status: DISCONTINUED | OUTPATIENT
Start: 2024-03-10 | End: 2024-03-11

## 2024-03-10 RX ADMIN — SEVELAMER CARBONATE 800 MG: 800 TABLET, FILM COATED ORAL at 06:03

## 2024-03-10 RX ADMIN — HYDRALAZINE HYDROCHLORIDE 50 MG: 50 TABLET ORAL at 09:03

## 2024-03-10 RX ADMIN — Medication 10 ML: at 01:03

## 2024-03-10 RX ADMIN — CINACALCET 90 MG: 30 TABLET, FILM COATED ORAL at 09:03

## 2024-03-10 RX ADMIN — HEPARIN SODIUM 5000 UNITS: 5000 INJECTION INTRAVENOUS; SUBCUTANEOUS at 09:03

## 2024-03-10 RX ADMIN — ATORVASTATIN CALCIUM 80 MG: 40 TABLET, FILM COATED ORAL at 09:03

## 2024-03-10 RX ADMIN — CARVEDILOL 50 MG: 25 TABLET, FILM COATED ORAL at 06:03

## 2024-03-10 RX ADMIN — NIFEDIPINE 60 MG: 30 TABLET, FILM COATED, EXTENDED RELEASE ORAL at 09:03

## 2024-03-10 RX ADMIN — Medication 10 ML: at 09:03

## 2024-03-10 RX ADMIN — SEVELAMER CARBONATE 800 MG: 800 TABLET, FILM COATED ORAL at 01:03

## 2024-03-10 RX ADMIN — HEPARIN SODIUM 5000 UNITS: 5000 INJECTION INTRAVENOUS; SUBCUTANEOUS at 01:03

## 2024-03-10 RX ADMIN — CLOPIDOGREL BISULFATE 75 MG: 75 TABLET ORAL at 09:03

## 2024-03-10 RX ADMIN — CARVEDILOL 50 MG: 25 TABLET, FILM COATED ORAL at 09:03

## 2024-03-10 RX ADMIN — EZETIMIBE 10 MG: 10 TABLET ORAL at 09:03

## 2024-03-10 RX ADMIN — ASPIRIN 81 MG: 81 TABLET, COATED ORAL at 09:03

## 2024-03-10 NOTE — ED TRIAGE NOTES
Haroldo Dickinson, a 48 y.o. male presents to the ED w/ complaint of chest pain that radiates down the left arm since 11PM. Pt received 324mg of ASA, 3 sprays of nitro, and nitro paste via EMS, chest pain resolved at this time. NSTEMI in November, dialysis pt.    Triage note:  Chief Complaint   Patient presents with    Chest Pain     Radiating down left arm. Since 11pm. Dialysis pt. NSTEMI in November. 3 sprays nitro. 324mg aspirin, and 1/2 in nitro paste. CP resolved at this time      Review of patient's allergies indicates:   Allergen Reactions    No known allergies      Past Medical History:   Diagnosis Date    CAD (coronary artery disease), native coronary artery 11/21/2019    Cataract     Diabetes mellitus     Diabetic retinopathy     Dialysis patient     DM type 2 causing renal disease, not at goal     ESRD (end stage renal disease) started dialysis 01/2014 06/05/2014    Hyperparathyroidism, secondary renal 06/05/2014    Hypertension     NSTEMI (non-ST elevated myocardial infarction) 12/21/2013    Organ transplant candidate 06/05/2014    Pneumonia     Renal cell carcinoma of right kidney     Renal hypertension     Stroke

## 2024-03-10 NOTE — HPI
48M with CAD s/p LHC in 11/2023, PAD, HFpEF (60% with GIIIDD), HTN, HLD, DM2, RCC s/p bilateral nephrectomy, ESRD, and anemia of ESRD(HD MWF) who presents 3/10 with acute onset chest pressure and palpitations which resolved with nitro. Nephrology has been consulted for ESRD management while IP. Last dialysis session Friday 3/8. He denies SOB, leg swelling, nausea,vomiting, diarrhea. At time of examination, patient in no acute distress. BP 140s/70s, HR 66,  Na 138, L 3.6, Bicarb 24, BUN 28, Creatintin 8.8.

## 2024-03-10 NOTE — SUBJECTIVE & OBJECTIVE
Past Medical History:   Diagnosis Date    CAD (coronary artery disease), native coronary artery 11/21/2019    Cataract     Diabetes mellitus     Diabetic retinopathy     Dialysis patient     DM type 2 causing renal disease, not at goal     ESRD (end stage renal disease) started dialysis 01/2014 06/05/2014    Hyperparathyroidism, secondary renal 06/05/2014    Hypertension     NSTEMI (non-ST elevated myocardial infarction) 12/21/2013    Organ transplant candidate 06/05/2014    Pneumonia     Renal cell carcinoma of right kidney     Renal hypertension     Stroke        Past Surgical History:   Procedure Laterality Date    ANGIOGRAM, CORONARY, WITH LEFT HEART CATHETERIZATION N/A 7/1/2021    Procedure: Angiogram, Coronary, with Left Heart Cath;  Surgeon: Miki Zendejas MD;  Location: [facility] CATH LAB;  Service: Cardiology;  Laterality: N/A;    ANGIOGRAM, CORONARY, WITH LEFT HEART CATHETERIZATION N/A 11/28/2023    Procedure: Angiogram, Coronary, with Left Heart Cath;  Surgeon: Noah Pendleton MD;  Location: [facility] CATH LAB;  Service: Cardiology;  Laterality: N/A;    COLONOSCOPY N/A 5/3/2017    Procedure: COLONOSCOPY;  Surgeon: Rufino Carpenter MD;  Location: [facility] ([address] [place] [address]);  Service: Endoscopy;  Laterality: N/A;  pt states can only schedule on Wednesdays    COLONOSCOPY N/A 2/9/2021    Procedure: COLONOSCOPY;  Surgeon: Edil Wong MD;  Location: [facility] (4TH FLR);  Service: Endoscopy;  Laterality: N/A;  Dialysis MWF/ labwork day of procedure  right arm aceess  per Dr. Colin pt can hold Plavix 5 days prior see note- sm  COVID test on 2/6/21 at [facility] -     COLONOSCOPY N/A 2/10/2021    Procedure: COLONOSCOPY;  Surgeon: Robert Ayers MD;  Location: [facility] ENDO (4TH FLR);  Service: Endoscopy;  Laterality: N/A;  rescheduled due to poor bowel prep-BB  negative covid screening 2/6/21-BB  dialysis M-W-F-BB  okay to r/s for 2/9/21 and to hold Plavix per Dr. KIRAN Wong-BB  labs same day-BB    CORONARY STENT PLACEMENT N/A  7/25/2019    Procedure: INSERTION, STENT, CORONARY ARTERY;  Surgeon: Miki Zendejas MD;  Location: [facility] CATH LAB;  Service: Cardiology;  Laterality: N/A;    DIALYSIS FISTULA CREATION      FISTULOGRAM N/A 2/18/2019    Procedure: Fistulogram;  Surgeon: Yaneth De La Cruz MD;  Location: [facility] CATH LAB;  Service: Cardiology;  Laterality: N/A;    FISTULOGRAM Right 7/23/2019    Procedure: Fistulogram;  Surgeon: Oz Cordoba MD;  Location: [facility] CATH LAB;  Service: Cardiology;  Laterality: Right;    LAPAROSCOPIC ROBOT-ASSISTED SURGICAL REMOVAL OF KIDNEY USING DA JAIME XI Right 5/19/2022    Procedure: XI ROBOTIC NEPHRECTOMY;  Surgeon: Aidan Hendricks MD;  Location: [facility] OR [facility] FLR;  Service: Urology;  Laterality: Right;  3hrs    LAPAROSCOPIC ROBOT-ASSISTED SURGICAL REMOVAL OF KIDNEY USING DA JAIME XI Left 1/5/2023    Procedure: XI ROBOTIC NEPHRECTOMY;  Surgeon: Aidan Hendricks MD;  Location: [facility] OR [facility] FLR;  Service: Urology;  Laterality: Left;  4 hrs    LEFT HEART CATHETERIZATION Left 7/25/2019    Procedure: Left heart cath;  Surgeon: Miki Zendejas MD;  Location: [facility] CATH LAB;  Service: Cardiology;  Laterality: Left;    REPAIR  1/5/2023    Procedure: REPAIR; COLOTOMY;  Surgeon: Maikel Lamb MD;  Location: [facility] OR [facility] FLR;  Service: General;;    RETINAL LASER PROCEDURE Bilateral 2018 or 2017    Dr. Rothman    VASCULAR SURGERY         Review of patient's allergies indicates:   Allergen Reactions    No known allergies        Current Facility-Administered Medications on File Prior to Encounter   Medication    lidocaine (PF) 10 mg/ml (1%) injection 10 mg     Current Outpatient Medications on File Prior to Encounter   Medication Sig    aspirin (ECOTRIN) 81 MG EC tablet Take 1 tablet (81 mg total) by mouth once daily.    atorvastatin (LIPITOR) 80 MG tablet Take 1 tablet (80 mg total) by mouth once daily.    carvediloL (COREG) 25 MG tablet Take 2 tablets (50 mg total) by mouth 2 (two) times daily with  meals.    ciclopirox (PENLAC) 8 % Soln Apply topically nightly.    cinacalcet (SENSIPAR) 90 MG Tab Take 1 tablet by mouth once daily.    clopidogreL (PLAVIX) 75 mg tablet Take 1 tablet (75 mg total) by mouth once daily.    ezetimibe (ZETIA) 10 mg tablet Take 1 tablet (10 mg total) by mouth once daily.    hydrALAZINE (APRESOLINE) 50 MG tablet Take 1 tablet (50 mg total) by mouth once daily. for SBP > 140mm HG    NIFEdipine (PROCARDIA-XL) 60 MG (OSM) 24 hr tablet Take 1 tablet (60 mg total) by mouth once daily.    polyethylene glycol (GLYCOLAX) 17 gram/dose powder Use cap to measure 17 g, then mix in liquid and drink by mouth once daily.    sevelamer carbonate (RENVELA) 800 mg Tab Take 800 mg by mouth 3 (three) times daily with meals.    sildenafiL (VIAGRA) 100 MG tablet Take 1 tablet (100 mg total) by mouth daily as needed for Erectile Dysfunction (take on an empty stomach 30-60 minutes before).     Family History       Problem Relation (Age of Onset)    Cancer Maternal Grandfather    Cataracts Mother    Diabetes Mother    Heart disease Brother    Hypertension Mother, Father, Sister, Brother    Kidney disease Brother          Tobacco Use    Smoking status: Never    Smokeless tobacco: Never   Substance and Sexual Activity    Alcohol use: Not Currently     Comment: One drink a month    Drug use: No    Sexual activity: Yes     Partners: Female     Birth control/protection: None       ROS  General ROS: negative for weight loss, weight gain, fevers, chills, night sweats  ENT ROS: negative for epistaxis, headaches or sinus pain  Ophthalmic ROS: negative for blurry vision, decreased vision, double vision or itchy eyes  Cardiovascular ROS: See HPI  Respiratory ROS: negative for cough, shortness of breath, or wheezing  Gastrointestinal ROS: negative for abdominal pain, change in bowel habits, black or bloody stools, nausea, emesis, or bloating  Genito-Urinary ROS: negative for dysuria, trouble voiding, or  hematuria  Neurological ROS: negative for TIA or stroke symptoms  Endocrine ROS: negative for hair pattern changes or unexpected weight changes  Musculoskeletal ROS: negative for muscle pain, weakness, joint pain or joint swelling  Skin: negative for rash, wounds, skin breakdown, or issues otherwise  Hematological and Lymphatic ROS: negative for bleeding, bruising, swollen lymph nodes  Psychological ROS: negative for anxiety or depression      Objective:     Vital Signs (Most Recent):  Temp: 97.7 °F (36.5 °C) (03/10/24 0716)  Pulse: 72 (03/10/24 1026)  Resp: 18 (03/10/24 1026)  BP: (!) 154/72 (03/10/24 1026)  SpO2: 97 % (03/10/24 1026) Vital Signs (24h Range):  Temp:  [97.7 °F (36.5 °C)-98 °F (36.7 °C)] 97.7 °F (36.5 °C)  Pulse:  [66-78] 72  Resp:  [12-18] 18  SpO2:  [97 %-100 %] 97 %  BP: (154-188)/(72-90) 154/72     Weight: 72.6 kg (160 lb 0.9 oz)  Body mass index is 25.07 kg/m².       Physical Exam  Gen: in NAD, appears stated age  Neuro: AAOx4, CN2-12 grossly intact BL; motor, sensory, and strength grossly intact BL  HEENT: NTNC, EOMI, PERRLA, MMM; no thyromegaly or lymphadenopathy; no JVD appreciated  CVS: RRR, no m/r/g; S1/S2 auscultated with no S3 or S4; capillary refill < 2 sec  Resp: lungs CTAB, no w/r/r; no belabored breathing or accessory muscle use appreciated   Abd: BS+ in all 4 quadrants; NTND, soft to palpation; no organomegaly appreciated   Extrem: pulses full, equal, and regular over all 4 extremities; no UE or LE edema BL       Significant Labs: All pertinent labs within the past 24 hours have been reviewed.    Significant Imaging: I have reviewed all pertinent imaging results/findings within the past 24 hours.

## 2024-03-10 NOTE — HPI
"Haroldo Dickinson is a 49yo M with a PMH of CAD (3-vessel disease seen on [facility] in November 2023), PAD, HFpEF (60% with G3DD), HTN, HLD, DM2, ESRD, and anemia of ESRD who presented to [facility] on 3/10/2024 with complaints of chest pressure and palpitations. Pt reports that he was in his usual state of health until day PTA: he first noticed something was different when he became winded after walking from his car to the pharmacy, which was not a far distance, and he does not normally have SOB during these activities; SOB resolved, though he also noted that he was "hotter than usual" during this episode. At 11pm, while he was getting ready for bed, he developed sudden onset palpitations with associated chest pressure and pain in his left arm. Denies F/C/N/V/D/C, HA, overt CP, abdominal pain, dysuria, extremity swelling, or symptoms otherwise. He tried sitting up for a while to see if this improved his symptoms, but they persisted; he called EMS, who provided nitro sprays and ASA, which did cause symptomatic improvement. He was then brought to the ER.    In the ED, vitals significant for /88. Labs remarkable for , though were otherwise stable at baseline. Troponin negative. CXR without any acute cardiopulmonary processes. Hospital medicine was consulted for admission and further management.  "

## 2024-03-10 NOTE — ACP (ADVANCE CARE PLANNING)
[facility] Medicine Code Status Documentation Note    Spoke with Mr. Dickinson regarding their end of life intentions and goals of care. Discussed code status and explained differences between full code and DNR, and answered all questions to the pt's satisfaction.     At this time, pt desires to be full code. Order has been placed in chart to reflect their wishes.    Omar Marino MD  Attending Physician  Medical Director - [facility]  Department of [facility] Medicine  3/10/2024      Advance Care Planning     Date: 03/10/2024    Code Status  I engaged the the patient in a voluntary conversation about the patient's preferences for care  at the very end of life. The patient wishes to have CPR or other invasive treatments performed when his heart and/or breathing stops. I communicated to the patient that a full code order would be placed in his medical record to reflect this preference.  I spent a total of 16 minutes engaging the patient in this advance care planning discussion.

## 2024-03-10 NOTE — ASSESSMENT & PLAN NOTE
- Working to optimize BP control  - Continue home GDMT regimen  - Will continue to monitor and further titrate antihypertensives as clinically indicated

## 2024-03-10 NOTE — H&P
"  [facility]i [facility]orge - Emergency Dept  [facility] Medicine  History & Physical    Patient Name: Haroldo Dickinson  MRN: [id number]  Patient Class: OP- Observation  Admission Date: 3/10/2024  Attending Physician: Omar Marino MD   Primary Care Provider: Mireya Larose MD         Patient information was obtained from patient and ER records.     Subjective:     Principal Problem:Coronary artery disease involving native coronary artery of native heart with unstable angina pectoris    Chief Complaint:   Chief Complaint   Patient presents with    Chest Pain     Radiating down left arm. Since 11pm. Dialysis pt. NSTEMI in November. 3 sprays nitro. 324mg aspirin, and 1/2 in nitro paste. CP resolved at this time         HPI: Haroldo Dickinson is a 49yo M with a PMH of CAD (3-vessel disease seen on [facility] in November 2023), PAD, HFpEF (60% with G3DD), HTN, HLD, DM2, ESRD, and anemia of ESRD who [facility] on 3[facility]10/2024 with complaints of chest pressure and palpitations. Pt reports that he was in his usual state of health until day PTA: he first noticed something was different when he became winded after walking from his car to the pharmacy, which was not a far distance, and he does not normally have SOB during these activities; SOB resolved, though he also noted that he was "hotter than usual" during this episode. At 11pm, while he was getting ready for bed, he developed sudden onset palpitations with associated chest pressure and pain in his left arm. Denies F/C/N/V/D/C, HA, overt CP, abdominal pain, dysuria, extremity swelling, or symptoms otherwise. He tried sitting up for a while to see if this improved his symptoms, but they persisted; he called EMS, who provided nitro sprays and ASA, which did cause symptomatic improvement. He was then brought to the ER.    In the ED, vitals significant for /88. Labs remarkable for , though were otherwise stable at baseline. Troponin negative. CXR without any acute cardiopulmonary " processes. [facility] medicine was consulted for admission and further management.    Past Medical History:   Diagnosis Date    CAD (coronary artery disease), native coronary artery 11/21/2019    Cataract     Diabetes mellitus     Diabetic retinopathy     Dialysis patient     DM type 2 causing renal disease, not at goal     ESRD (end stage renal disease) started dialysis 01/2014 06/05/2014    Hyperparathyroidism, secondary renal 06/05/2014    Hypertension     NSTEMI (non-ST elevated myocardial infarction) 12/21/2013    Organ transplant candidate 06/05/2014    Pneumonia     Renal cell carcinoma of right kidney     Renal hypertension     Stroke        Past Surgical History:   Procedure Laterality Date    ANGIOGRAM, CORONARY, WITH LEFT HEART CATHETERIZATION N/A 7/1/2021    Procedure: Angiogram, Coronary, with Left Heart Cath;  Surgeon: Miki Zendejas MD;  Location: [facility] CATH LAB;  Service: Cardiology;  Laterality: N/A;    ANGIOGRAM, CORONARY, WITH LEFT HEART CATHETERIZATION N/A 11/28/2023    Procedure: Angiogram, Coronary, with Left Heart Cath;  Surgeon: Noah Pendleton MD;  Location: [facility] CATH LAB;  Service: Cardiology;  Laterality: N/A;    COLONOSCOPY N/A 5/3/2017    Procedure: COLONOSCOPY;  Surgeon: Rufino Carpenter MD;  Location: [facility] (4TH FLR);  Service: Endoscopy;  Laterality: N/A;  pt states can only schedule on Wednesdays    COLONOSCOPY N/A 2/9/2021    Procedure: COLONOSCOPY;  Surgeon: Edil Wong MD;  Location: [facility] (4TH FLR);  Service: Endoscopy;  Laterality: N/A;  Dialysis MWF/ labwork day of procedure  right arm aceess  per Dr. Colin pt can hold Plavix 5 days prior see note- sm  COVID test on 2/6/21 at [facility] -     COLONOSCOPY N/A 2/10/2021    Procedure: COLONOSCOPY;  Surgeon: Robert Ayers MD;  Location: [facility] ENDO (4TH FLR);  Service: Endoscopy;  Laterality: N/A;  rescheduled due to poor bowel prep-BB  negative covid screening 2/6/21-BB  dialysis M-W-F-BB  okay to r/s for 2/9/21 and to  hold Plavix per Dr. KIRAN Wong-BB  labs same day-BB    CORONARY STENT PLACEMENT N/A 7/25/2019    Procedure: INSERTION, STENT, CORONARY ARTERY;  Surgeon: Miki Zendejas MD;  Location: [facility] CATH LAB;  Service: Cardiology;  Laterality: N/A;    DIALYSIS FISTULA CREATION      FISTULOGRAM N/A 2/18/2019    Procedure: Fistulogram;  Surgeon: Yaneth De La Cruz MD;  Location: [facility] CATH LAB;  Service: Cardiology;  Laterality: N/A;    FISTULOGRAM Right 7/23/2019    Procedure: Fistulogram;  Surgeon: Oz Cordoba MD;  Location: [facility] CATH LAB;  Service: Cardiology;  Laterality: Right;    LAPAROSCOPIC ROBOT-ASSISTED SURGICAL REMOVAL OF KIDNEY USING DA JAIME XI Right 5/19/2022    Procedure: XI ROBOTIC NEPHRECTOMY;  Surgeon: Aidan Hendricks MD;  Location: [facility] OR [facility] FLR;  Service: Urology;  Laterality: Right;  3hrs    LAPAROSCOPIC ROBOT-ASSISTED SURGICAL REMOVAL OF KIDNEY USING DA JAIME XI Left 1/5/2023    Procedure: XI ROBOTIC NEPHRECTOMY;  Surgeon: Aidan Hendricks MD;  Location: [facility] OR [facility] FLR;  Service: Urology;  Laterality: Left;  4 hrs    LEFT HEART CATHETERIZATION Left 7/25/2019    Procedure: Left heart cath;  Surgeon: Miki Zendejas MD;  Location: [facility] CATH LAB;  Service: Cardiology;  Laterality: Left;    REPAIR  1/5/2023    Procedure: REPAIR; COLOTOMY;  Surgeon: Maikel Lamb MD;  [facility];  Service: General;;    RETINAL LASER PROCEDURE Bilateral 2018 or 2017    Dr. Rothman    VASCULAR SURGERY         Review of patient's allergies indicates:   Allergen Reactions    No known allergies        Current Facility-Administered Medications on File Prior to Encounter   Medication    lidocaine (PF) 10 mg/ml (1%) injection 10 mg     Current Outpatient Medications on File Prior to Encounter   Medication Sig    aspirin (ECOTRIN) 81 MG EC tablet Take 1 tablet (81 mg total) by mouth once daily.    atorvastatin (LIPITOR) 80 MG tablet Take 1 tablet (80 mg total) by mouth once daily.    carvediloL  (COREG) 25 MG tablet Take 2 tablets (50 mg total) by mouth 2 (two) times daily with meals.    ciclopirox (PENLAC) 8 % Soln Apply topically nightly.    cinacalcet (SENSIPAR) 90 MG Tab Take 1 tablet by mouth once daily.    clopidogreL (PLAVIX) 75 mg tablet Take 1 tablet (75 mg total) by mouth once daily.    ezetimibe (ZETIA) 10 mg tablet Take 1 tablet (10 mg total) by mouth once daily.    hydrALAZINE (APRESOLINE) 50 MG tablet Take 1 tablet (50 mg total) by mouth once daily. for SBP > 140mm HG    NIFEdipine (PROCARDIA-XL) 60 MG (OSM) 24 hr tablet Take 1 tablet (60 mg total) by mouth once daily.    polyethylene glycol (GLYCOLAX) 17 gram/dose powder Use cap to measure 17 g, then mix in liquid and drink by mouth once daily.    sevelamer carbonate (RENVELA) 800 mg Tab Take 800 mg by mouth 3 (three) times daily with meals.    sildenafiL (VIAGRA) 100 MG tablet Take 1 tablet (100 mg total) by mouth daily as needed for Erectile Dysfunction (take on an empty stomach 30-60 minutes before).     Family History       Problem Relation (Age of Onset)    Cancer Maternal Grandfather    Cataracts Mother    Diabetes Mother    Heart disease Brother    Hypertension Mother, Father, Sister, Brother    Kidney disease Brother          Tobacco Use    Smoking status: Never    Smokeless tobacco: Never   Substance and Sexual Activity    Alcohol use: Not Currently     Comment: One drink a month    Drug use: No    Sexual activity: Yes     Partners: Female     Birth control/protection: None       ROS  General ROS: negative for weight loss, weight gain, fevers, chills, night sweats  ENT ROS: negative for epistaxis, headaches or sinus pain  Ophthalmic ROS: negative for blurry vision, decreased vision, double vision or itchy eyes  Cardiovascular ROS: See HPI  Respiratory ROS: negative for cough, shortness of breath, or wheezing  Gastrointestinal ROS: negative for abdominal pain, change in bowel habits, black or bloody stools, nausea, emesis, or  bloating  Genito-Urinary ROS: negative for dysuria, trouble voiding, or hematuria  Neurological ROS: negative for TIA or stroke symptoms  Endocrine ROS: negative for hair pattern changes or unexpected weight changes  Musculoskeletal ROS: negative for muscle pain, weakness, joint pain or joint swelling  Skin: negative for rash, wounds, skin breakdown, or issues otherwise  Hematological and Lymphatic ROS: negative for bleeding, bruising, swollen lymph nodes  Psychological ROS: negative for anxiety or depression      Objective:     Vital Signs (Most Recent):  Temp: 97.7 °F (36.5 °C) (03/10/24 0716)  Pulse: 72 (03/10/24 1026)  Resp: 18 (03/10/24 1026)  BP: (!) 154/72 (03/10/24 1026)  SpO2: 97 % (03/10/24 1026) Vital Signs (24h Range):  Temp:  [97.7 °F (36.5 °C)-98 °F (36.7 °C)] 97.7 °F (36.5 °C)  Pulse:  [66-78] 72  Resp:  [12-18] 18  SpO2:  [97 %-100 %] 97 %  BP: (154-188)/(72-90) 154/72     Weight: 72.6 kg (160 lb 0.9 oz)  Body mass index is 25.07 kg/m².       Physical Exam  Gen: in NAD, appears stated age  Neuro: AAOx4, CN2-12 grossly intact BL; motor, sensory, and strength grossly intact BL  HEENT: NTNC, EOMI, PERRLA, MMM; no thyromegaly or lymphadenopathy; no JVD appreciated  CVS: RRR, no m/r/g; S1/S2 auscultated with no S3 or S4; capillary refill < 2 sec  Resp: lungs CTAB, no w/r/r; no belabored breathing or accessory muscle use appreciated   Abd: BS+ in all 4 quadrants; NTND, soft to palpation; no organomegaly appreciated   Extrem: pulses full, equal, and regular over all 4 extremities; no UE or LE edema BL       Significant Labs: All pertinent labs within the past 24 hours have been reviewed.    Significant Imaging: I have reviewed all pertinent imaging results/findings within the past 24 hours.  Assessment/Plan:     * Coronary artery disease involving native coronary artery of native heart with unstable angina pectoris  - Interval history and physical exam findings as described above  - Initial troponin in the  ED WNL  - Will continue to trend troponin q6h  - NPO at [facility] for stress test in the AM   - Cardiac PET stress testing ordered, pending   - Echo pending  - Given 3-vessel disease seen on recent C, will also consult IC for recommendations  - Continue home GDMT regimen  - PRN SLNG provided  - Will continue to monitor on tele    Chronic diastolic heart failure  - Euvolemic  - Continue home GDMT regimen  - Will continue to monitor on tele    PAD (peripheral artery disease)  - Continue home ASA and statin regimen    Hypertension associated with stage 5 chronic kidney disease due to type 2 diabetes mellitus  - Working to optimize BP control  - Continue home GDMT regimen  - Will continue to monitor and further titrate antihypertensives as clinically indicated     Hyperlipidemia associated with type 2 diabetes mellitus  - Continue home statin and zetia regimen    Type 2 diabetes mellitus with chronic kidney disease on chronic dialysis, without long-term current use of insulin  - Working to optimize BG control  - SSI provided for corrective dosing  - POCT glucose checks as ordered  - Hypoglycemic protocol in effect  - Diabetic diet provided    ESRD on hemodialysis  - Interval history and physical exam findings as described above  - Nephology consulted: further decision for HD per nephology  - Renally dosing all medications  - Avoiding nephrotoxins  - Renal diet  - Will continue to monitor on daily labs    Anemia in end-stage renal disease  - H/H stable near baseline  - Will continue to monitor on daily labs      VTE Risk Mitigation (From admission, onward)           Ordered     heparin (porcine) injection 5,000 Units  Every 8 hours         03/10/24 0845     IP VTE HIGH RISK PATIENT  Once         03/10/24 0845     Place sequential compression device  Until discontinued         03/10/24 0845                       On 03/10/2024, patient should be placed in hospital observation services under my care.           Omar QUINN  MD Ned  Attending Physician  Medical Director - [facility]  Department of Hospital Medicine  3/10/2024

## 2024-03-10 NOTE — ASSESSMENT & PLAN NOTE
- Interval history and physical exam findings as described above  - Initial troponin in the ED WNL  - Will continue to trend troponin q6h  - NPO at [facility] for stress test in the AM   - Cardiac PET stress testing ordered, pending   - Echo pending  - Given 3-vessel disease seen on recent [facility], will also consult IC for recommendations  - Continue home GDMT regimen  - PRN SLNG provided  - Will continue to monitor on tele

## 2024-03-10 NOTE — NURSING
Nurses Note -- 4 Eyes      3/10/2024   12:39 PM      Skin assessed during: Admit      [x] No Altered Skin Integrity Present    [x]Prevention Measures Documented      [] Yes- Altered Skin Integrity Present or Discovered   [] LDA Added if Not in Epic (Describe Wound)   [] New Altered Skin Integrity was Present on Admit and Documented in LDA   [] Wound Image Taken    Wound Care Consulted? No    Attending Nurse:  Cher Guzman RN/Staff Member:   Jacey

## 2024-03-10 NOTE — SUBJECTIVE & OBJECTIVE
Past Medical History:   Diagnosis Date    CAD (coronary artery disease), native coronary artery 11/21/2019    Cataract     Diabetes mellitus     Diabetic retinopathy     Dialysis patient     DM type 2 causing renal disease, not at goal     ESRD (end stage renal disease) started dialysis 01/2014 06/05/2014    Hyperparathyroidism, secondary renal 06/05/2014    Hypertension     NSTEMI (non-ST elevated myocardial infarction) 12/21/2013    Organ transplant candidate 06/05/2014    Pneumonia     Renal cell carcinoma of right kidney     Renal hypertension     Stroke        Past Surgical History:   Procedure Laterality Date    ANGIOGRAM, CORONARY, WITH LEFT HEART CATHETERIZATION N/A 7/1/2021    Procedure: Angiogram, Coronary, with Left Heart Cath;  Surgeon: Miki Zendejas MD;  Location: [facility] CATH LAB;  Service: Cardiology;  Laterality: N/A;    ANGIOGRAM, CORONARY, WITH LEFT HEART CATHETERIZATION N/A 11/28/2023    Procedure: Angiogram, Coronary, with Left Heart Cath;  Surgeon: Noah Pendleton MD;  Location: [facility] CATH LAB;  Service: Cardiology;  Laterality: N/A;    COLONOSCOPY N/A 5/3/2017    Procedure: COLONOSCOPY;  Surgeon: Rufino Carpenter MD;  Location: [facility] ([address] [place] [address]);  Service: Endoscopy;  Laterality: N/A;  pt states can only schedule on Wednesdays    COLONOSCOPY N/A 2/9/2021    Procedure: COLONOSCOPY;  Surgeon: Edil Wong MD;  Location: [facility] (4TH FLR);  Service: Endoscopy;  Laterality: N/A;  Dialysis MWF/ labwork day of procedure  right arm aceess  per Dr. Colin pt can hold Plavix 5 days prior see note- sm  COVID test on 2/6/21 at [facility] -     COLONOSCOPY N/A 2/10/2021    Procedure: COLONOSCOPY;  Surgeon: Robert Ayers MD;  Location: [facility] ENDO (4TH FLR);  Service: Endoscopy;  Laterality: N/A;  rescheduled due to poor bowel prep-BB  negative covid screening 2/6/21-BB  dialysis M-W-F-BB  okay to r/s for 2/9/21 and to hold Plavix per Dr. KIRAN Wong-BB  labs same day-BB    CORONARY STENT PLACEMENT N/A  7/25/2019    Procedure: INSERTION, STENT, CORONARY ARTERY;  Surgeon: Miki Zendejas MD;  Location: [facility] CATH LAB;  Service: Cardiology;  Laterality: N/A;    DIALYSIS FISTULA CREATION      FISTULOGRAM N/A 2/18/2019    Procedure: Fistulogram;  Surgeon: Yaneth De La Cruz MD;  Location: [facility] CATH LAB;  Service: Cardiology;  Laterality: N/A;    FISTULOGRAM Right 7/23/2019    Procedure: Fistulogram;  Surgeon: Oz Cordoba MD;  Location: [facility] CATH LAB;  Service: Cardiology;  Laterality: Right;    LAPAROSCOPIC ROBOT-ASSISTED SURGICAL REMOVAL OF KIDNEY USING DA AJIME XI Right 5/19/2022    Procedure: XI ROBOTIC NEPHRECTOMY;  Surgeon: Aidan Hendricks MD;  Location: [facility] OR [facility] FLR;  Service: Urology;  Laterality: Right;  3hrs    LAPAROSCOPIC ROBOT-ASSISTED SURGICAL REMOVAL OF KIDNEY USING DA JAIME XI Left 1/5/2023    Procedure: XI ROBOTIC NEPHRECTOMY;  Surgeon: Aidan Hendricks MD;  Location: [facility] OR [facility] FLR;  Service: Urology;  Laterality: Left;  4 hrs    LEFT HEART CATHETERIZATION Left 7/25/2019    Procedure: Left heart cath;  Surgeon: Miki Zendejas MD;  Location: [facility] CATH LAB;  Service: Cardiology;  Laterality: Left;    REPAIR  1/5/2023    Procedure: REPAIR; COLOTOMY;  Surgeon: Maikel Lamb MD;  Location: [facility] OR [facility] FLR;  Service: General;;    RETINAL LASER PROCEDURE Bilateral 2018 or 2017    Dr. Rothman    VASCULAR SURGERY         Review of patient's allergies indicates:   Allergen Reactions    No known allergies      Current Facility-Administered Medications   Medication Frequency    acetaminophen tablet 650 mg Q4H PRN    albuterol-ipratropium 2.5 mg-0.5 mg/3 mL nebulizer solution 3 mL Q6H PRN    aluminum-magnesium hydroxide-simethicone 200-200-20 mg/5 mL suspension 30 mL QID PRN    aspirin EC tablet 81 mg Daily    atorvastatin tablet 80 mg Daily    carvediloL tablet 50 mg BID WM    cinacalcet tablet 90 mg Daily    clopidogreL tablet 75 mg Daily    ezetimibe tablet 10 mg Daily    heparin  (porcine) injection 5,000 Units Q8H    hydrALAZINE injection 10 mg Q6H PRN    hydrALAZINE tablet 50 mg Daily    melatonin tablet 6 mg Nightly PRN    naloxone 0.4 mg/mL injection 0.02 mg PRN    NIFEdipine 24 hr tablet 60 mg Daily    ondansetron injection 4 mg Q8H PRN    polyethylene glycol packet 17 g Daily PRN    prochlorperazine injection Soln 5 mg Q6H PRN    sevelamer carbonate tablet 800 mg TID WM    simethicone chewable tablet 80 mg QID PRN    sodium chloride 0.9% flush 10 mL Q8H     Current Outpatient Medications   Medication    aspirin (ECOTRIN) 81 MG EC tablet    atorvastatin (LIPITOR) 80 MG tablet    carvediloL (COREG) 25 MG tablet    ciclopirox (PENLAC) 8 % Soln    cinacalcet (SENSIPAR) 90 MG Tab    clopidogreL (PLAVIX) 75 mg tablet    ezetimibe (ZETIA) 10 mg tablet    hydrALAZINE (APRESOLINE) 50 MG tablet    NIFEdipine (PROCARDIA-XL) 60 MG (OSM) 24 hr tablet    polyethylene glycol (GLYCOLAX) 17 gram/dose powder    sevelamer carbonate (RENVELA) 800 mg Tab    sildenafiL (VIAGRA) 100 MG tablet     Facility-Administered Medications Ordered in Other Encounters   Medication Frequency    lidocaine (PF) 10 mg/ml (1%) injection 10 mg Once     Family History       Problem Relation (Age of Onset)    Cancer Maternal Grandfather    Cataracts Mother    Diabetes Mother    Heart disease Brother    Hypertension Mother, Father, Sister, Brother    Kidney disease Brother          Tobacco Use    Smoking status: Never    Smokeless tobacco: Never   Substance and Sexual Activity    Alcohol use: Not Currently     Comment: One drink a month    Drug use: No    Sexual activity: Yes     Partners: Female     Birth control/protection: None     Review of Systems   Constitutional:  Negative for activity change, chills and fatigue.   HENT:  Negative for congestion, facial swelling, postnasal drip, rhinorrhea, sinus pressure, sinus pain and sneezing.    Respiratory:  Negative for cough, chest tightness and shortness of breath.     Cardiovascular:  Negative for chest pain, palpitations and leg swelling.   Gastrointestinal:  Negative for abdominal distention, constipation, diarrhea, nausea and vomiting.   Musculoskeletal:  Negative for arthralgias, gait problem and myalgias.   Skin:  Negative for color change, rash and wound.   Neurological:  Negative for dizziness, light-headedness and headaches.   Psychiatric/Behavioral:  Negative for agitation, behavioral problems and confusion.      Objective:     Vital Signs (Most Recent):  Temp: 97.7 °F (36.5 °C) (03/10/24 0716)  Pulse: 66 (03/10/24 0902)  Resp: 12 (03/10/24 0902)  BP: (!) 175/81 (03/10/24 0902)  SpO2: 97 % (03/10/24 0902) Vital Signs (24h Range):  Temp:  [97.7 °F (36.5 °C)-98 °F (36.7 °C)] 97.7 °F (36.5 °C)  Pulse:  [66-78] 66  Resp:  [12-18] 12  SpO2:  [97 %-100 %] 97 %  BP: (168-188)/(79-90) 175/81     Weight: 72.6 kg (160 lb 0.9 oz) (03/10/24 0521)  Body mass index is 25.07 kg/m².  Body surface area is 1.85 meters squared.    No intake/output data recorded.     Physical Exam  Vitals and nursing note reviewed.   Constitutional:       General: He is not in acute distress.     Appearance: Normal appearance. He is not ill-appearing.   HENT:      Head: Normocephalic and atraumatic.   Eyes:      General: No scleral icterus.     Pupils: Pupils are equal, round, and reactive to light.   Cardiovascular:      Rate and Rhythm: Normal rate and regular rhythm.      Pulses: Normal pulses.      Heart sounds: Normal heart sounds.      Comments: R forearm AVF  Pulmonary:      Effort: Pulmonary effort is normal. No respiratory distress.      Breath sounds: Normal breath sounds.   Abdominal:      General: Abdomen is flat. There is no distension.      Palpations: Abdomen is soft.      Tenderness: There is no abdominal tenderness.   Musculoskeletal:      Right lower leg: No edema.      Left lower leg: No edema.   Lymphadenopathy:      Cervical: No cervical adenopathy.   Skin:     General: Skin is warm  "and dry.      Capillary Refill: Capillary refill takes less than 2 seconds.   Neurological:      General: No focal deficit present.      Mental Status: He is alert and oriented to person, place, and time.          Significant Labs:  ABGs: No results for input(s): "PH", "PCO2", "HCO3", "POCSATURATED", "BE" in the last 168 hours.  BMP:   Recent Labs   Lab 03/10/24  0550   *      K 3.6      CO2 24   BUN 28*   CREATININE 8.8*   CALCIUM 9.1     Cardiac Markers: No results for input(s): "CKMB", "TROPONINT", "MYOGLOBIN" in the last 168 hours.  CBC:   Recent Labs   Lab 03/10/24  0550   WBC 9.98   RBC 3.08*   HGB 9.6*   HCT 30.6*      MCV 99*   MCH 31.2*   MCHC 31.4*     CMP:   Recent Labs   Lab 03/10/24  0550   *   CALCIUM 9.1   ALBUMIN 3.4*   PROT 7.3      K 3.6   CO2 24      BUN 28*   CREATININE 8.8*   ALKPHOS 81   ALT 24   AST 20   BILITOT 0.6     Coagulation: No results for input(s): "PT", "INR", "APTT" in the last 168 hours.  LFTs:   Recent Labs   Lab 03/10/24  0550   ALT 24   AST 20   ALKPHOS 81   BILITOT 0.6   PROT 7.3   ALBUMIN 3.4*     Microbiology Results (last 7 days)       ** No results found for the last 168 hours. **          Specimen (24h ago, onward)      None          PTH: No results for input(s): "PTH" in the last 168 hours.  TSH: No results for input(s): "TSH" in the last 168 hours.  No results for input(s): "COLORU", "CLARITYU", "SPECGRAV", "PHUR", "PROTEINUA", "GLUCOSEU", "BILIRUBINCON", "BLOODU", "WBCU", "RBCU", "BACTERIA", "MUCUS", "NITRITE", "LEUKOCYTESUR", "UROBILINOGEN", "HYALINECASTS" in the last 168 hours.    Significant Imaging:  I have reviewed all imagining in the last 24 hours.  "

## 2024-03-10 NOTE — NURSING
Pt is refusing blood glucose checks, and sates he is not a diabetic. MD aware. Plan of care continues.

## 2024-03-10 NOTE — ASSESSMENT & PLAN NOTE
ESRD on iHD MWF  Dr. Butt  4 hours  ALBARO AVF  EDW ~ 71 kg  No residual renal fx    Appears euvolemic on exam    Plan/Recommendations:  - no urgent need for dialysis at this time  -Will manage dialysis while IP, plan for first session tomorrow  -continue strict I/O's  -RFP daily  -renal diet when advanced, on home phos binders and cinacalcet

## 2024-03-10 NOTE — CONSULTS
[facility]  Nephrology  Consult Note    Patient Name: Haroldo Dickinson  MRN: [id number]  Admission Date: 3/10/2024  Hospital Length of Stay: 0 days  Attending Provider: Omar Marino MD   Primary Care Physician: Mireya Larose MD  Principal Problem:Coronary artery disease involving native coronary artery of native heart with unstable angina pectoris    Inpatient consult to Nephrology  Consult performed by: Servando Ca MD  Consult ordered by: Omar Marino MD        Subjective:     HPI: 48M with CAD s/p LHC in 11/2023, PAD, HFpEF (60% with GIIIDD), HTN, HLD, DM2, RCC s/p bilateral nephrectomy, ESRD, and anemia of ESRD(HD MWF) who presents 3/10 with acute onset chest pressure and palpitations which resolved with nitro. Nephrology has been consulted for ESRD management while IP. Last dialysis session Friday 3/8. He denies SOB, leg swelling, nausea,vomiting, diarrhea. At time of examination, patient in no acute distress. BP 140s/70s, HR 66,  Na 138, L 3.6, Bicarb 24, BUN 28, Creatintin 8.8.    Past Medical History:   Diagnosis Date    CAD (coronary artery disease), native coronary artery 11/21/2019    Cataract     Diabetes mellitus     Diabetic retinopathy     Dialysis patient     DM type 2 causing renal disease, not at goal     ESRD (end stage renal disease) started dialysis 01/2014 06/05/2014    Hyperparathyroidism, secondary renal 06/05/2014    Hypertension     NSTEMI (non-ST elevated myocardial infarction) 12/21/2013    Organ transplant candidate 06/05/2014    Pneumonia     Renal cell carcinoma of right kidney     Renal hypertension     Stroke        Past Surgical History:   Procedure Laterality Date    ANGIOGRAM, CORONARY, WITH LEFT HEART CATHETERIZATION N/A 7/1/2021    Procedure: Angiogram, Coronary, with Left Heart Cath;  Surgeon: Miki Zendejas MD;  Location: [facility] CATH LAB;  Service: Cardiology;  Laterality: N/A;    ANGIOGRAM, CORONARY, WITH LEFT HEART CATHETERIZATION N/A 11/28/2023     Procedure: Angiogram, Coronary, with Left Heart Cath;  Surgeon: Noah Pendleton MD;  Location: [facility] CATH LAB;  Service: Cardiology;  Laterality: N/A;    COLONOSCOPY N/A 5/3/2017    Procedure: COLONOSCOPY;  Surgeon: Rufino Carpenter MD;  Location: [facility] ENDO (4TH FLR);  Service: Endoscopy;  Laterality: N/A;  pt states can only schedule on Wednesdays    COLONOSCOPY N/A 2/9/2021    Procedure: COLONOSCOPY;  Surgeon: Edil Wong MD;  Location: [facility] (4TH FLR);  Service: Endoscopy;  Laterality: N/A;  Dialysis MWF/ labwork day of procedure  right arm aceess  per Dr. Colin pt can hold Plavix 5 days prior see note- sm  COVID test on 2/6/21 at [facility] -     COLONOSCOPY N/A 2/10/2021    Procedure: COLONOSCOPY;  Surgeon: Robert Ayers MD;  Location: [facility] (4TH FLR);  Service: Endoscopy;  Laterality: N/A;  rescheduled due to poor bowel prep-BB  negative covid screening 2/6/21-BB  dialysis M-W-F-BB  okay to r/s for 2/9/21 and to hold Plavix per Dr. KIRAN Wong-BB  labs same day-BB    CORONARY STENT PLACEMENT N/A 7/25/2019    Procedure: INSERTION, STENT, CORONARY ARTERY;  Surgeon: Miki Zendejas MD;  Location: [facility] CATH LAB;  Service: Cardiology;  Laterality: N/A;    DIALYSIS FISTULA CREATION      FISTULOGRAM N/A 2/18/2019    Procedure: Fistulogram;  Surgeon: Yaneth De La Cruz MD;  Location: [facility] CATH LAB;  Service: Cardiology;  Laterality: N/A;    FISTULOGRAM Right 7/23/2019    Procedure: Fistulogram;  Surgeon: Oz Cordoba MD;  Location: [facility] CATH LAB;  Service: Cardiology;  Laterality: Right;    LAPAROSCOPIC ROBOT-ASSISTED SURGICAL REMOVAL OF KIDNEY USING DA JAIME XI Right 5/19/2022    Procedure: XI ROBOTIC NEPHRECTOMY;  Surgeon: Aidan Hendricks MD;  Location: [address];  Service: Urology;  Laterality: Right;  3hrs    LAPAROSCOPIC ROBOT-ASSISTED SURGICAL REMOVAL OF KIDNEY USING DA JAIME XI Left 1/5/2023    Procedure: XI ROBOTIC NEPHRECTOMY;  Surgeon: Aidan Hendricks MD;  Location: [facility] OR [facility]  FLR;  Service: Urology;  Laterality: Left;  4 hrs    LEFT HEART CATHETERIZATION Left 7/25/2019    Procedure: Left heart cath;  Surgeon: Miki Zendjeas MD;  Location: [facility] CATH LAB;  Service: Cardiology;  Laterality: Left;    REPAIR  1/5/2023    Procedure: REPAIR; COLOTOMY;  Surgeon: Maikel Lamb MD;  [facility];  Service: General;;    RETINAL LASER PROCEDURE Bilateral 2018 or 2017    Dr. Rothman    VASCULAR SURGERY         Review of patient's allergies indicates:   Allergen Reactions    No known allergies      Current Facility-Administered Medications   Medication Frequency    acetaminophen tablet 650 mg Q4H PRN    albuterol-ipratropium 2.5 mg-0.5 mg/3 mL nebulizer solution 3 mL Q6H PRN    aluminum-magnesium hydroxide-simethicone 200-200-20 mg/5 mL suspension 30 mL QID PRN    aspirin EC tablet 81 mg Daily    atorvastatin tablet 80 mg Daily    carvediloL tablet 50 mg BID WM    cinacalcet tablet 90 mg Daily    clopidogreL tablet 75 mg Daily    ezetimibe tablet 10 mg Daily    heparin (porcine) injection 5,000 Units Q8H    hydrALAZINE injection 10 mg Q6H PRN    hydrALAZINE tablet 50 mg Daily    melatonin tablet 6 mg Nightly PRN    naloxone 0.4 mg/mL injection 0.02 mg PRN    NIFEdipine 24 hr tablet 60 mg Daily    ondansetron injection 4 mg Q8H PRN    polyethylene glycol packet 17 g Daily PRN    prochlorperazine injection Soln 5 mg Q6H PRN    sevelamer carbonate tablet 800 mg TID WM    simethicone chewable tablet 80 mg QID PRN    sodium chloride 0.9% flush 10 mL Q8H     Current Outpatient Medications   Medication    aspirin (ECOTRIN) 81 MG EC tablet    atorvastatin (LIPITOR) 80 MG tablet    carvediloL (COREG) 25 MG tablet    ciclopirox (PENLAC) 8 % Soln    cinacalcet (SENSIPAR) 90 MG Tab    clopidogreL (PLAVIX) 75 mg tablet    ezetimibe (ZETIA) 10 mg tablet    hydrALAZINE (APRESOLINE) 50 MG tablet    NIFEdipine (PROCARDIA-XL) 60 MG (OSM) 24 hr tablet    polyethylene glycol (GLYCOLAX) 17 gram/dose  powder    sevelamer carbonate (RENVELA) 800 mg Tab    sildenafiL (VIAGRA) 100 MG tablet     Facility-Administered Medications Ordered in Other Encounters   Medication Frequency    lidocaine (PF) 10 mg/ml (1%) injection 10 mg Once     Family History       Problem Relation (Age of Onset)    Cancer Maternal Grandfather    Cataracts Mother    Diabetes Mother    Heart disease Brother    Hypertension Mother, Father, Sister, Brother    Kidney disease Brother          Tobacco Use    Smoking status: Never    Smokeless tobacco: Never   Substance and Sexual Activity    Alcohol use: Not Currently     Comment: One drink a month    Drug use: No    Sexual activity: Yes     Partners: Female     Birth control/protection: None     Review of Systems   Constitutional:  Negative for activity change, chills and fatigue.   HENT:  Negative for congestion, facial swelling, postnasal drip, rhinorrhea, sinus pressure, sinus pain and sneezing.    Respiratory:  Negative for cough, chest tightness and shortness of breath.    Cardiovascular:  Negative for chest pain, palpitations and leg swelling.   Gastrointestinal:  Negative for abdominal distention, constipation, diarrhea, nausea and vomiting.   Musculoskeletal:  Negative for arthralgias, gait problem and myalgias.   Skin:  Negative for color change, rash and wound.   Neurological:  Negative for dizziness, light-headedness and headaches.   Psychiatric/Behavioral:  Negative for agitation, behavioral problems and confusion.      Objective:     Vital Signs (Most Recent):  Temp: 97.7 °F (36.5 °C) (03/10/24 0716)  Pulse: 66 (03/10/24 0902)  Resp: 12 (03/10/24 0902)  BP: (!) 175/81 (03/10/24 0902)  SpO2: 97 % (03/10/24 0902) Vital Signs (24h Range):  Temp:  [97.7 °F (36.5 °C)-98 °F (36.7 °C)] 97.7 °F (36.5 °C)  Pulse:  [66-78] 66  Resp:  [12-18] 12  SpO2:  [97 %-100 %] 97 %  BP: (168-188)/(79-90) 175/81     Weight: 72.6 kg (160 lb 0.9 oz) (03/10/24 0521)  Body mass index is 25.07 kg/m².  Body  "surface area is 1.85 meters squared.    No intake/output data recorded.     Physical Exam  Vitals and nursing note reviewed.   Constitutional:       General: He is not in acute distress.     Appearance: Normal appearance. He is not ill-appearing.   HENT:      Head: Normocephalic and atraumatic.   Eyes:      General: No scleral icterus.     Pupils: Pupils are equal, round, and reactive to light.   Cardiovascular:      Rate and Rhythm: Normal rate and regular rhythm.      Pulses: Normal pulses.      Heart sounds: Normal heart sounds.      Comments: R forearm AVF  Pulmonary:      Effort: Pulmonary effort is normal. No respiratory distress.      Breath sounds: Normal breath sounds.   Abdominal:      General: Abdomen is flat. There is no distension.      Palpations: Abdomen is soft.      Tenderness: There is no abdominal tenderness.   Musculoskeletal:      Right lower leg: No edema.      Left lower leg: No edema.   Lymphadenopathy:      Cervical: No cervical adenopathy.   Skin:     General: Skin is warm and dry.      Capillary Refill: Capillary refill takes less than 2 seconds.   Neurological:      General: No focal deficit present.      Mental Status: He is alert and oriented to person, place, and time.          Significant Labs:  ABGs: No results for input(s): "PH", "PCO2", "HCO3", "POCSATURATED", "BE" in the last 168 hours.  BMP:   Recent Labs   Lab 03/10/24  0550   *      K 3.6      CO2 24   BUN 28*   CREATININE 8.8*   CALCIUM 9.1     Cardiac Markers: No results for input(s): "CKMB", "TROPONINT", "MYOGLOBIN" in the last 168 hours.  CBC:   Recent Labs   Lab 03/10/24  0550   WBC 9.98   RBC 3.08*   HGB 9.6*   HCT 30.6*      MCV 99*   MCH 31.2*   MCHC 31.4*     CMP:   Recent Labs   Lab 03/10/24  0550   *   CALCIUM 9.1   ALBUMIN 3.4*   PROT 7.3      K 3.6   CO2 24      BUN 28*   CREATININE 8.8*   ALKPHOS 81   ALT 24   AST 20   BILITOT 0.6     Coagulation: No results for " "input(s): "PT", "INR", "APTT" in the last 168 hours.  LFTs:   Recent Labs   Lab 03/10/24  0550   ALT 24   AST 20   ALKPHOS 81   BILITOT 0.6   PROT 7.3   ALBUMIN 3.4*     Microbiology Results (last 7 days)       ** No results found for the last 168 hours. **          Specimen (24h ago, onward)      None          PTH: No results for input(s): "PTH" in the last 168 hours.  TSH: No results for input(s): "TSH" in the last 168 hours.  No results for input(s): "COLORU", "CLARITYU", "SPECGRAV", "PHUR", "PROTEINUA", "GLUCOSEU", "BILIRUBINCON", "BLOODU", "WBCU", "RBCU", "BACTERIA", "MUCUS", "NITRITE", "LEUKOCYTESUR", "UROBILINOGEN", "HYALINECASTS" in the last 168 hours.    Significant Imaging:  I have reviewed all imagining in the last 24 hours.  Assessment/Plan:     Renal/  ESRD on hemodialysis  ESRD on iHD MWF  Dr. Butt  4 hours  ALBARO AVF  EDW ~ 71 kg  No residual renal fx    Appears euvolemic on exam    Plan/Recommendations:  - no urgent need for dialysis at this time  -Will manage dialysis while IP, plan for first session tomorrow  -continue strict I/O's  -RFP daily  -renal diet when advanced, on home phos binders and cinacalcet      Thank you for your consult. I will follow-up with patient. Please contact us if you have any additional questions.    Servando Ca MD  Nephrology  [facility]  "

## 2024-03-10 NOTE — PLAN OF CARE
Problem: Adult Inpatient Plan of Care  Goal: Plan of Care Review  Outcome: Ongoing, Progressing  Goal: Patient-Specific Goal (Individualized)  Outcome: Ongoing, Progressing  Goal: Absence of Hospital-Acquired Illness or Injury  Outcome: Ongoing, Progressing  Goal: Optimal Comfort and Wellbeing  Outcome: Ongoing, Progressing  Goal: Readiness for Transition of Care  Outcome: Ongoing, Progressing     Problem: Infection  Goal: Absence of Infection Signs and Symptoms  Outcome: Ongoing, Progressing     Problem: Diabetes Comorbidity  Goal: Blood Glucose Level Within Targeted Range  Outcome: Ongoing, Progressing     Problem: Device-Related Complication Risk (Hemodialysis)  Goal: Safe, Effective Therapy Delivery  Outcome: Ongoing, Progressing     Problem: Hemodynamic Instability (Hemodialysis)  Goal: Effective Tissue Perfusion  Outcome: Ongoing, Progressing     Problem: Infection (Hemodialysis)  Goal: Absence of Infection Signs and Symptoms  Outcome: Ongoing, Progressing

## 2024-03-10 NOTE — ASSESSMENT & PLAN NOTE
- Working to optimize BG control  - SSI provided for corrective dosing  - POCT glucose checks as ordered  - Hypoglycemic protocol in effect  - Diabetic diet provided

## 2024-03-10 NOTE — ED PROVIDER NOTES
Encounter Date: 3/10/2024       History     Chief Complaint   Patient presents with    Chest Pain     Radiating down left arm. Since 11pm. Dialysis pt. NSTEMI in November. 3 sprays nitro. 324mg aspirin, and 1/2 in nitro paste. CP resolved at this time      Patient is a 48-year-old male with past medical history of CAD, DM2, ESRD, CVA, hypertension who presents with chest pain that started last night while at rest. He reported associated shortness of breath. Pain radiated down his left arm at this time. He was given nitro by EMS and his symptoms resolved.    The history is provided by the patient and medical records.     Review of patient's allergies indicates:   Allergen Reactions    No known allergies      Past Medical History:   Diagnosis Date    CAD (coronary artery disease), native coronary artery 11/21/2019    Cataract     Diabetes mellitus     Diabetic retinopathy     Dialysis patient     DM type 2 causing renal disease, not at goal     ESRD (end stage renal disease) started dialysis 01/2014 06/05/2014    Hyperparathyroidism, secondary renal 06/05/2014    Hypertension     NSTEMI (non-ST elevated myocardial infarction) 12/21/2013    Organ transplant candidate 06/05/2014    Pneumonia     Renal cell carcinoma of right kidney     Renal hypertension     Stroke      Past Surgical History:   Procedure Laterality Date    ANGIOGRAM, CORONARY, WITH LEFT HEART CATHETERIZATION N/A 7/1/2021    Procedure: Angiogram, Coronary, with Left Heart Cath;  Surgeon: Miki Zendejas MD;  Location: [facility] CATH LAB;  Service: Cardiology;  Laterality: N/A;    ANGIOGRAM, CORONARY, WITH LEFT HEART CATHETERIZATION N/A 11/28/2023    Procedure: Angiogram, Coronary, with Left Heart Cath;  Surgeon: Noah Pendleton MD;  Location: [facility] CATH LAB;  Service: Cardiology;  Laterality: N/A;    COLONOSCOPY N/A 5/3/2017    Procedure: COLONOSCOPY;  Surgeon: Rufino Carpenter MD;  Location: [facility] ENDO ([address] [place] [address]);  Service: Endoscopy;  Laterality: N/A;  pt  [facility] can only schedule on Wednesdays    COLONOSCOPY N/A 2/9/2021    Procedure: COLONOSCOPY;  Surgeon: Edil Wong MD;  Location: [facility] ENDO (4TH FLR);  Service: Endoscopy;  Laterality: N/A;  Dialysis MWF/ labwork day of procedure  right arm aceess  per Dr. Colin pt can hold Plavix 5 days prior see note- sm  COVID test on 2/6/21 at [facility] - sm    COLONOSCOPY N/A 2/10/2021    Procedure: COLONOSCOPY;  Surgeon: Robert Ayers MD;  Location: [facility] ENDO (4TH FLR);  Service: Endoscopy;  Laterality: N/A;  rescheduled due to poor bowel prep-BB  negative covid screening 2/6/21-BB  dialysis M-W-F-BB  okay to r/s for 2/9/21 and to hold Plavix per Dr. KIRAN Wong-BB  labs same day-BB    CORONARY STENT PLACEMENT N/A 7/25/2019    Procedure: INSERTION, STENT, CORONARY ARTERY;  Surgeon: Miki Zendejas MD;  Location: [facility] CATH LAB;  Service: Cardiology;  Laterality: N/A;    DIALYSIS FISTULA CREATION      FISTULOGRAM N/A 2/18/2019    Procedure: Fistulogram;  Surgeon: Yaneth De La Cruz MD;  Location: [facility] CATH LAB;  Service: Cardiology;  Laterality: N/A;    FISTULOGRAM Right 7/23/2019    Procedure: Fistulogram;  Surgeon: Oz Cordoba MD;  Location: [facility] CATH LAB;  Service: Cardiology;  Laterality: Right;    LAPAROSCOPIC ROBOT-ASSISTED SURGICAL REMOVAL OF KIDNEY USING DA JAIME XI Right 5/19/2022    Procedure: XI ROBOTIC NEPHRECTOMY;  Surgeon: Aidan Hendricks MD;  Location: [address];  Service: Urology;  Laterality: Right;  3hrs    LAPAROSCOPIC ROBOT-ASSISTED SURGICAL REMOVAL OF KIDNEY USING DA JAIME XI Left 1/5/2023    Procedure: XI ROBOTIC NEPHRECTOMY;  Surgeon: Aidan Hendricks MD;  Location: [address];  Service: Urology;  Laterality: Left;  4 hrs    LEFT HEART CATHETERIZATION Left 7/25/2019    Procedure: Left heart cath;  Surgeon: Miki Zendejas MD;  Location: [facility] CATH LAB;  Service: Cardiology;  Laterality: Left;    REPAIR  1/5/2023    Procedure: REPAIR; COLOTOMY;  Surgeon: Maikel Lamb MD;   Location: [facility] OR [address] [place] [address];  Service: General;;    RETINAL LASER PROCEDURE Bilateral 2018 or 2017    Dr. Rothman    VASCULAR SURGERY       Family History   Problem Relation Age of Onset    Hypertension Mother     Diabetes Mother     Cataracts Mother     Hypertension Father     Hypertension Sister     Kidney disease Brother     Hypertension Brother     Heart disease Brother     Cancer Maternal Grandfather         Colon CA    Colon cancer Neg Hx     Esophageal cancer Neg Hx     Stomach cancer Neg Hx     Rectal cancer Neg Hx     Ulcerative colitis Neg Hx     Irritable bowel syndrome Neg Hx     Crohn's disease Neg Hx     Celiac disease Neg Hx     Glaucoma Neg Hx     Macular degeneration Neg Hx      Social History     Tobacco Use    Smoking status: Never    Smokeless tobacco: Never   Substance Use Topics    Alcohol use: Not Currently     Comment: One drink a month    Drug use: No         Physical Exam     Initial Vitals [03/10/24 0521]   BP Pulse Resp Temp SpO2   (!) 168/90 78 18 98 °F (36.7 °C) 100 %      MAP       --         Physical Exam    Nursing note and vitals reviewed.  Constitutional: He appears well-developed and well-nourished. He is not diaphoretic. No distress.   HENT:   Head: Normocephalic and atraumatic.   Eyes: EOM are normal.   Neck: Neck supple.   Normal range of motion.  Cardiovascular:  Normal rate and regular rhythm.           Pulmonary/Chest: No respiratory distress.   Abdominal: He exhibits no distension.   Musculoskeletal:         General: Normal range of motion.      Cervical back: Normal range of motion and neck supple.     Neurological: He is alert and oriented to person, place, and time. GCS score is 15. GCS eye subscore is 4. GCS verbal subscore is 5. GCS motor subscore is 6.   Skin: Skin is warm and dry.   Psychiatric: He has a normal mood and affect. His behavior is normal. Judgment and thought content normal.         ED Course   Procedures  Labs Reviewed   CBC W/ AUTO DIFFERENTIAL -  Abnormal; Notable for the following components:       Result Value    RBC 3.08 (*)     Hemoglobin 9.6 (*)     Hematocrit 30.6 (*)     MCV 99 (*)     MCH 31.2 (*)     MCHC 31.4 (*)     RDW 15.8 (*)     Mono # 1.2 (*)     Eos # 0.6 (*)     Lymph % 17.0 (*)     All other components within normal limits   COMPREHENSIVE METABOLIC PANEL - Abnormal; Notable for the following components:    Glucose 123 (*)     BUN 28 (*)     Creatinine 8.8 (*)     Albumin 3.4 (*)     eGFR 6.9 (*)     All other components within normal limits   TROPONIN I - Abnormal; Notable for the following components:    Troponin I 0.092 (*)     All other components within normal limits   B-TYPE NATRIURETIC PEPTIDE - Abnormal; Notable for the following components:     (*)     All other components within normal limits   HIV 1 / 2 ANTIBODY    Narrative:     Release to patient->Immediate   HEPATITIS C ANTIBODY    Narrative:     Release to patient->Immediate   TROPONIN ISTAT   POCT GLUCOSE MONITORING CONTINUOUS   POCT GLUCOSE MONITORING CONTINUOUS     EKG Readings: (Independently Interpreted)   Initial Reading: No STEMI. Previous EKG: Compared with most recent EKG Rhythm: Normal Sinus Rhythm. Heart Rate: 73. Ectopy: No Ectopy.     ECG Results              EKG 12-lead (Final result)        Collection Time Result Time QRS Duration O[facility] QTC Calculation    03/10/24 05:44:06 03/10/24 19:45:45 96 458                     Final result by Interface, Lab In [facility] (03/10/24 19:45:50)                   Narrative:    Test Reason : R07.9,    Vent. Rate : 073 BPM     Atrial Rate : 073 BPM     P-R Int : 194 ms          QRS Dur : 096 ms      QT Int : 416 ms       P-R-T Axes : 068 042 017 degrees     QTc Int : 458 ms    Normal sinus rhythm  Low septal forces and septal Q V2  Abnormal ECG  When compared with ECG of 07-DEC-2023 09:34,  Questionable change in QRS duration  Confirmed by Zeferino REGAN MD ([id number]) on 3/10/2024 7:45:43 PM    Referred By: SHY   SELF            Confirmed By:Zeferino REGAN MD                                  Imaging Results              X-Ray Chest AP Portable (Final result)  Result time 03/10/24 06:15:47      Final result by Yonatan Madden MD (03/10/24 06:15:47)                   Impression:      No convincing evidence of acute cardiopulmonary disease.      Electronically signed by: Yonatan Madden  Date:    03/10/2024  Time:    06:15               Narrative:    EXAMINATION:  XR CHEST AP PORTABLE    CLINICAL HISTORY:  Chest Pain;    TECHNIQUE:  Single frontal view of the chest was performed.    COMPARISON:  Chest radiograph performed 02/08/2024, 06:41 hours.    FINDINGS:  Monitoring leads overlie the chest.    Grossly unchanged cardiomediastinal contours.  Atherosclerosis of the aorta, as before.    Lungs essentially clear.    No definite pneumothorax or large volume pleural effusion.    No acute findings in the visualized abdomen.    Osseous and soft tissue structures without definite acute change.                                       Medications   hydrALAZINE injection 10 mg (10 mg Intravenous Given 3/11/24 0649)   aspirin EC tablet 81 mg (81 mg Oral Given 3/11/24 0939)   atorvastatin tablet 80 mg (80 mg Oral Given 3/11/24 0940)   carvediloL tablet 50 mg (50 mg Oral Given 3/11/24 1914)   cinacalcet tablet 90 mg (90 mg Oral Given 3/11/24 0939)   clopidogreL tablet 75 mg (75 mg Oral Given 3/11/24 0939)   ezetimibe tablet 10 mg (10 mg Oral Given 3/11/24 0940)   hydrALAZINE tablet 50 mg (50 mg Oral Given 3/11/24 0939)   NIFEdipine 24 hr tablet 60 mg (60 mg Oral Given 3/11/24 0939)   sevelamer carbonate tablet 800 mg (800 mg Oral Given 3/11/24 1913)   sodium chloride 0.9% flush 10 mL (10 mLs Intravenous Given 3/11/24 1336)   albuterol-ipratropium 2.5 mg-0.5 mg/3 mL nebulizer solution 3 mL (has no administration in time range)   melatonin tablet 6 mg (has no administration in time range)   ondansetron injection 4 mg (has no administration in time range)    prochlorperazine injection Soln 5 mg (has no administration in time range)   polyethylene glycol packet 17 g (has no administration in time range)   simethicone chewable tablet 80 mg (has no administration in time range)   aluminum-magnesium hydroxide-simethicone 200-200-20 mg/5 mL suspension 30 mL (has no administration in time range)   acetaminophen tablet 650 mg (has no administration in time range)   naloxone 0.4 mg/mL injection 0.02 mg (has no administration in time range)   glucose chewable tablet 16 g (has no administration in time range)   glucose chewable tablet 24 g (has no administration in time range)   dextrose 50% injection 12.5 g (has no administration in time range)   dextrose 50% injection 25 g (has no administration in time range)   glucagon (human recombinant) injection 1 mg (has no administration in time range)   insulin aspart U-100 pen 0-10 Units (has no administration in time range)   nitroGLYCERIN SL tablet 0.4 mg (has no administration in time range)   mupirocin 2 % ointment ( Nasal Not Given 3/11/24 0900)   0.9%  NaCl infusion (has no administration in time range)   sodium chloride 0.9% bolus 250 mL 250 mL (has no administration in time range)   heparin (porcine) injection 1,000 Units (has no administration in time range)   heparin 25,000 units in dextrose 5% 250 mL (100 units/mL) infusion LOW INTENSITY nomogram - [facility] (14 Units/kg/hr × 68.7 kg (Adjusted) Intravenous New Bag 3/11/24 1459)   heparin 25,000 units in dextrose 5% (100 units/ml) IV bolus from bag LOW INTENSITY nomogram - [facility] (has no administration in time range)   heparin 25,000 units in dextrose 5% (100 units/ml) IV bolus from bag LOW INTENSITY nomogram - [facility] (2,060 Units Intravenous Bolus from Bag 3/11/24 1502)   isosorbide mononitrate 24 hr tablet 30 mg (30 mg Oral Given 3/11/24 1507)   heparin 25,000 units in dextrose 5% (100 units/ml) IV bolus from bag LOW INTENSITY nomogram - [facility] (3,990 Units Intravenous Bolus from Bag  3/11/24 0724)     Medical Decision Making  48-year-old male with past medical history of ESRD, DM, CAD s/p PCI who presents with chest pain that started with rest last night around 8 hours ago. He was given nitro with EMS and had symptom relief. His last catheterization was November of 2023 and at that time he did have signs of CAD which was being medically managed.    Amount and/or Complexity of Data Reviewed  Labs: ordered.  Radiology: ordered.  ECG/medicine tests: ordered and independent interpretation performed.    Risk  Decision regarding hospitalization.                                      Clinical Impression:  Final diagnoses:  [R07.9] Chest pain          ED Disposition Condition    Observation                 Surekha Smallwood MD  03/11/24 2580

## 2024-03-11 PROBLEM — I24.9 ACUTE CORONARY SYNDROME WITH HIGH TROPONIN: Status: ACTIVE | Noted: 2024-03-11

## 2024-03-11 LAB
ALBUMIN SERPL BCP-MCNC: 3.1 G/DL (ref 3.5–5.2)
ALP SERPL-CCNC: 76 U/L (ref 55–135)
ALT SERPL W/O P-5'-P-CCNC: 19 U/L (ref 10–44)
ANION GAP SERPL CALC-SCNC: 12 MMOL/L (ref 8–16)
APTT PPP: 27.3 SEC (ref 21–32)
APTT PPP: 33 SEC (ref 21–32)
APTT PPP: 35 SEC (ref 21–32)
AST SERPL-CCNC: 47 U/L (ref 10–40)
BASOPHILS # BLD AUTO: 0.11 K/UL (ref 0–0.2)
BASOPHILS NFR BLD: 0.8 % (ref 0–1.9)
BILIRUB SERPL-MCNC: 0.7 MG/DL (ref 0.1–1)
BUN SERPL-MCNC: 43 MG/DL (ref 6–20)
CALCIUM SERPL-MCNC: 8.5 MG/DL (ref 8.7–10.5)
CHLORIDE SERPL-SCNC: 106 MMOL/L (ref 95–110)
CHOLEST SERPL-MCNC: 96 MG/DL (ref 120–199)
CHOLEST/HDLC SERPL: 1.9 {RATIO} (ref 2–5)
CO2 SERPL-SCNC: 22 MMOL/L (ref 23–29)
CREAT SERPL-MCNC: 11.3 MG/DL (ref 0.5–1.4)
DIFFERENTIAL METHOD BLD: ABNORMAL
EOSINOPHIL # BLD AUTO: 0.6 K/UL (ref 0–0.5)
EOSINOPHIL NFR BLD: 4.1 % (ref 0–8)
ERYTHROCYTE [DISTWIDTH] IN BLOOD BY AUTOMATED COUNT: 15.7 % (ref 11.5–14.5)
EST. GFR  (NO RACE VARIABLE): 5.1 ML/MIN/1.73 M^2
ESTIMATED AVG GLUCOSE: 88 MG/DL (ref 68–131)
GLUCOSE SERPL-MCNC: 81 MG/DL (ref 70–110)
HBA1C MFR BLD: 4.7 % (ref 4–5.6)
HBV SURFACE AG SERPL QL IA: NORMAL
HCT VFR BLD AUTO: 28.9 % (ref 40–54)
HDLC SERPL-MCNC: 51 MG/DL (ref 40–75)
HDLC SERPL: 53.1 % (ref 20–50)
HGB BLD-MCNC: 9 G/DL (ref 14–18)
IMM GRANULOCYTES # BLD AUTO: 0.05 K/UL (ref 0–0.04)
IMM GRANULOCYTES NFR BLD AUTO: 0.4 % (ref 0–0.5)
INR PPP: 1.1 (ref 0.8–1.2)
LDLC SERPL CALC-MCNC: 33.6 MG/DL (ref 63–159)
LYMPHOCYTES # BLD AUTO: 2 K/UL (ref 1–4.8)
LYMPHOCYTES NFR BLD: 14.9 % (ref 18–48)
MAGNESIUM SERPL-MCNC: 2 MG/DL (ref 1.6–2.6)
MCH RBC QN AUTO: 30.8 PG (ref 27–31)
MCHC RBC AUTO-ENTMCNC: 31.1 G/DL (ref 32–36)
MCV RBC AUTO: 99 FL (ref 82–98)
MONOCYTES # BLD AUTO: 1.4 K/UL (ref 0.3–1)
MONOCYTES NFR BLD: 10.5 % (ref 4–15)
NEUTROPHILS # BLD AUTO: 9.2 K/UL (ref 1.8–7.7)
NEUTROPHILS NFR BLD: 69.3 % (ref 38–73)
NONHDLC SERPL-MCNC: 45 MG/DL
NRBC BLD-RTO: 0 /100 WBC
OHS QRS DURATION: 102 MS
OHS QTC CALCULATION: 463 MS
PHOSPHATE SERPL-MCNC: 3.3 MG/DL (ref 2.7–4.5)
PLATELET # BLD AUTO: 300 K/UL (ref 150–450)
PMV BLD AUTO: 10 FL (ref 9.2–12.9)
POCT GLUCOSE: 104 MG/DL (ref 70–110)
POCT GLUCOSE: 107 MG/DL (ref 70–110)
POTASSIUM SERPL-SCNC: 3.7 MMOL/L (ref 3.5–5.1)
PROT SERPL-MCNC: 6.5 G/DL (ref 6–8.4)
PROTHROMBIN TIME: 11.4 SEC (ref 9–12.5)
RBC # BLD AUTO: 2.92 M/UL (ref 4.6–6.2)
SODIUM SERPL-SCNC: 140 MMOL/L (ref 136–145)
TRIGL SERPL-MCNC: 57 MG/DL (ref 30–150)
TROPONIN I SERPL DL<=0.01 NG/ML-MCNC: 13.69 NG/ML (ref 0–0.03)
TROPONIN I SERPL DL<=0.01 NG/ML-MCNC: 15.59 NG/ML (ref 0–0.03)
TROPONIN I SERPL DL<=0.01 NG/ML-MCNC: 25.41 NG/ML (ref 0–0.03)
WBC # BLD AUTO: 13.26 K/UL (ref 3.9–12.7)

## 2024-03-11 PROCEDURE — 80053 COMPREHEN METABOLIC PANEL: CPT | Performed by: STUDENT IN AN ORGANIZED HEALTH CARE EDUCATION/TRAINING PROGRAM

## 2024-03-11 PROCEDURE — 80061 LIPID PANEL: CPT | Performed by: STUDENT IN AN ORGANIZED HEALTH CARE EDUCATION/TRAINING PROGRAM

## 2024-03-11 PROCEDURE — 63600175 PHARM REV CODE 636 W HCPCS: Performed by: STUDENT IN AN ORGANIZED HEALTH CARE EDUCATION/TRAINING PROGRAM

## 2024-03-11 PROCEDURE — 83036 HEMOGLOBIN GLYCOSYLATED A1C: CPT | Performed by: STUDENT IN AN ORGANIZED HEALTH CARE EDUCATION/TRAINING PROGRAM

## 2024-03-11 PROCEDURE — 99223 1ST HOSP IP/OBS HIGH 75: CPT | Mod: GC,,, | Performed by: INTERNAL MEDICINE

## 2024-03-11 PROCEDURE — 96372 THER/PROPH/DIAG INJ SC/IM: CPT | Performed by: STUDENT IN AN ORGANIZED HEALTH CARE EDUCATION/TRAINING PROGRAM

## 2024-03-11 PROCEDURE — A4216 STERILE WATER/SALINE, 10 ML: HCPCS | Performed by: STUDENT IN AN ORGANIZED HEALTH CARE EDUCATION/TRAINING PROGRAM

## 2024-03-11 PROCEDURE — 36415 COLL VENOUS BLD VENIPUNCTURE: CPT | Performed by: STUDENT IN AN ORGANIZED HEALTH CARE EDUCATION/TRAINING PROGRAM

## 2024-03-11 PROCEDURE — 96375 TX/PRO/DX INJ NEW DRUG ADDON: CPT

## 2024-03-11 PROCEDURE — 96374 THER/PROPH/DIAG INJ IV PUSH: CPT

## 2024-03-11 PROCEDURE — 90935 HEMODIALYSIS ONE EVALUATION: CPT | Mod: ,,, | Performed by: INTERNAL MEDICINE

## 2024-03-11 PROCEDURE — 83735 ASSAY OF MAGNESIUM: CPT | Performed by: STUDENT IN AN ORGANIZED HEALTH CARE EDUCATION/TRAINING PROGRAM

## 2024-03-11 PROCEDURE — 25000003 PHARM REV CODE 250: Performed by: STUDENT IN AN ORGANIZED HEALTH CARE EDUCATION/TRAINING PROGRAM

## 2024-03-11 PROCEDURE — 20600001 HC STEP DOWN PRIVATE ROOM

## 2024-03-11 PROCEDURE — 87340 HEPATITIS B SURFACE AG IA: CPT | Performed by: STUDENT IN AN ORGANIZED HEALTH CARE EDUCATION/TRAINING PROGRAM

## 2024-03-11 PROCEDURE — 85025 COMPLETE CBC W/AUTO DIFF WBC: CPT | Performed by: STUDENT IN AN ORGANIZED HEALTH CARE EDUCATION/TRAINING PROGRAM

## 2024-03-11 PROCEDURE — 84484 ASSAY OF TROPONIN QUANT: CPT | Performed by: STUDENT IN AN ORGANIZED HEALTH CARE EDUCATION/TRAINING PROGRAM

## 2024-03-11 PROCEDURE — 85610 PROTHROMBIN TIME: CPT | Performed by: STUDENT IN AN ORGANIZED HEALTH CARE EDUCATION/TRAINING PROGRAM

## 2024-03-11 PROCEDURE — 5A1D70Z PERFORMANCE OF URINARY FILTRATION, INTERMITTENT, LESS THAN 6 HOURS PER DAY: ICD-10-PCS | Performed by: STUDENT IN AN ORGANIZED HEALTH CARE EDUCATION/TRAINING PROGRAM

## 2024-03-11 PROCEDURE — 93005 ELECTROCARDIOGRAM TRACING: CPT

## 2024-03-11 PROCEDURE — 84100 ASSAY OF PHOSPHORUS: CPT | Performed by: STUDENT IN AN ORGANIZED HEALTH CARE EDUCATION/TRAINING PROGRAM

## 2024-03-11 PROCEDURE — 85730 THROMBOPLASTIN TIME PARTIAL: CPT | Mod: 91 | Performed by: STUDENT IN AN ORGANIZED HEALTH CARE EDUCATION/TRAINING PROGRAM

## 2024-03-11 PROCEDURE — 93010 ELECTROCARDIOGRAM REPORT: CPT | Mod: ,,, | Performed by: INTERNAL MEDICINE

## 2024-03-11 PROCEDURE — 80100016 HC MAINTENANCE HEMODIALYSIS

## 2024-03-11 RX ORDER — HYDRALAZINE HYDROCHLORIDE 50 MG/1
50 TABLET, FILM COATED ORAL EVERY 8 HOURS
Qty: 270 TABLET | Refills: 3 | OUTPATIENT
Start: 2024-03-11 | End: 2024-03-13

## 2024-03-11 RX ORDER — ISOSORBIDE MONONITRATE 30 MG/1
30 TABLET, EXTENDED RELEASE ORAL DAILY
Status: DISCONTINUED | OUTPATIENT
Start: 2024-03-11 | End: 2024-03-12

## 2024-03-11 RX ORDER — HEPARIN SODIUM,PORCINE/D5W 25000/250
0-40 INTRAVENOUS SOLUTION INTRAVENOUS CONTINUOUS
Status: DISCONTINUED | OUTPATIENT
Start: 2024-03-11 | End: 2024-03-13 | Stop reason: HOSPADM

## 2024-03-11 RX ADMIN — CARVEDILOL 50 MG: 25 TABLET, FILM COATED ORAL at 07:03

## 2024-03-11 RX ADMIN — ASPIRIN 81 MG: 81 TABLET, COATED ORAL at 09:03

## 2024-03-11 RX ADMIN — HEPARIN SODIUM AND DEXTROSE 14 UNITS/KG/HR: 10000; 5 INJECTION INTRAVENOUS at 02:03

## 2024-03-11 RX ADMIN — MUPIROCIN: 20 OINTMENT TOPICAL at 09:03

## 2024-03-11 RX ADMIN — ATORVASTATIN CALCIUM 80 MG: 40 TABLET, FILM COATED ORAL at 09:03

## 2024-03-11 RX ADMIN — ISOSORBIDE MONONITRATE 30 MG: 30 TABLET, EXTENDED RELEASE ORAL at 03:03

## 2024-03-11 RX ADMIN — EZETIMIBE 10 MG: 10 TABLET ORAL at 09:03

## 2024-03-11 RX ADMIN — CINACALCET 90 MG: 30 TABLET, FILM COATED ORAL at 09:03

## 2024-03-11 RX ADMIN — SEVELAMER CARBONATE 800 MG: 800 TABLET, FILM COATED ORAL at 01:03

## 2024-03-11 RX ADMIN — NIFEDIPINE 60 MG: 30 TABLET, FILM COATED, EXTENDED RELEASE ORAL at 09:03

## 2024-03-11 RX ADMIN — HEPARIN SODIUM AND DEXTROSE 14 UNITS/KG/HR: 10000; 5 INJECTION INTRAVENOUS at 10:03

## 2024-03-11 RX ADMIN — HEPARIN SODIUM 5000 UNITS: 5000 INJECTION INTRAVENOUS; SUBCUTANEOUS at 05:03

## 2024-03-11 RX ADMIN — HEPARIN SODIUM AND DEXTROSE 12 UNITS/KG/HR: 10000; 5 INJECTION INTRAVENOUS at 07:03

## 2024-03-11 RX ADMIN — SEVELAMER CARBONATE 800 MG: 800 TABLET, FILM COATED ORAL at 07:03

## 2024-03-11 RX ADMIN — CLOPIDOGREL BISULFATE 75 MG: 75 TABLET ORAL at 09:03

## 2024-03-11 RX ADMIN — HYDRALAZINE HYDROCHLORIDE 50 MG: 50 TABLET ORAL at 09:03

## 2024-03-11 RX ADMIN — Medication 10 ML: at 01:03

## 2024-03-11 RX ADMIN — Medication 10 ML: at 05:03

## 2024-03-11 RX ADMIN — HYDRALAZINE HYDROCHLORIDE 10 MG: 20 INJECTION INTRAMUSCULAR; INTRAVENOUS at 06:03

## 2024-03-11 NOTE — SUBJECTIVE & OBJECTIVE
Past Medical History:   Diagnosis Date    CAD (coronary artery disease), native coronary artery 11/21/2019    Cataract     Diabetes mellitus     Diabetic retinopathy     Dialysis patient     DM type 2 causing renal disease, not at goal     ESRD (end stage renal disease) started dialysis 01/2014 06/05/2014    Hyperparathyroidism, secondary renal 06/05/2014    Hypertension     NSTEMI (non-ST elevated myocardial infarction) 12/21/2013    Organ transplant candidate 06/05/2014    Pneumonia     Renal cell carcinoma of right kidney     Renal hypertension     Stroke        Past Surgical History:   Procedure Laterality Date    ANGIOGRAM, CORONARY, WITH LEFT HEART CATHETERIZATION N/A 7/1/2021    Procedure: Angiogram, Coronary, with Left Heart Cath;  Surgeon: Miki Zendejas MD;  Location: [facility] CATH LAB;  Service: Cardiology;  Laterality: N/A;    ANGIOGRAM, CORONARY, WITH LEFT HEART CATHETERIZATION N/A 11/28/2023    Procedure: Angiogram, Coronary, with Left Heart Cath;  Surgeon: Noah Pendleton MD;  Location: [facility] CATH LAB;  Service: Cardiology;  Laterality: N/A;    COLONOSCOPY N/A 5/3/2017    Procedure: COLONOSCOPY;  Surgeon: Rufino Carpenter MD;  Location: [facility] ([address] [place] [address]);  Service: Endoscopy;  Laterality: N/A;  pt states can only schedule on Wednesdays    COLONOSCOPY N/A 2/9/2021    Procedure: COLONOSCOPY;  Surgeon: Edil Wong MD;  Location: [facility] (4TH FLR);  Service: Endoscopy;  Laterality: N/A;  Dialysis MWF/ labwork day of procedure  right arm aceess  per Dr. Colin pt can hold Plavix 5 days prior see note- sm  COVID test on 2/6/21 at [facility] -     COLONOSCOPY N/A 2/10/2021    Procedure: COLONOSCOPY;  Surgeon: Robert Ayers MD;  Location: [facility] ENDO (4TH FLR);  Service: Endoscopy;  Laterality: N/A;  rescheduled due to poor bowel prep-BB  negative covid screening 2/6/21-BB  dialysis M-W-F-BB  okay to r/s for 2/9/21 and to hold Plavix per Dr. KIRAN Wong-BB  labs same day-BB    CORONARY STENT PLACEMENT N/A  7/25/2019    Procedure: INSERTION, STENT, CORONARY ARTERY;  Surgeon: Miki Zendejas MD;  Location: [facility] CATH LAB;  Service: Cardiology;  Laterality: N/A;    DIALYSIS FISTULA CREATION      FISTULOGRAM N/A 2/18/2019    Procedure: Fistulogram;  Surgeon: Yaneth De La Cruz MD;  Location: [facility] CATH LAB;  Service: Cardiology;  Laterality: N/A;    FISTULOGRAM Right 7/23/2019    Procedure: Fistulogram;  Surgeon: Oz Cordoba MD;  Location: [facility] CATH LAB;  Service: Cardiology;  Laterality: Right;    LAPAROSCOPIC ROBOT-ASSISTED SURGICAL REMOVAL OF KIDNEY USING DA JAIME XI Right 5/19/2022    Procedure: XI ROBOTIC NEPHRECTOMY;  Surgeon: Aidan Hendricks MD;  Location: [facility] OR [facility] FLR;  Service: Urology;  Laterality: Right;  3hrs    LAPAROSCOPIC ROBOT-ASSISTED SURGICAL REMOVAL OF KIDNEY USING DA JAIME XI Left 1/5/2023    Procedure: XI ROBOTIC NEPHRECTOMY;  Surgeon: Aidan Hendricks MD;  Location: [facility] OR [facility] FLR;  Service: Urology;  Laterality: Left;  4 hrs    LEFT HEART CATHETERIZATION Left 7/25/2019    Procedure: Left heart cath;  Surgeon: Miki Zendejas MD;  Location: [facility] CATH LAB;  Service: Cardiology;  Laterality: Left;    REPAIR  1/5/2023    Procedure: REPAIR; COLOTOMY;  Surgeon: Maikel Lamb MD;  Location: [facility] OR [facility] FLR;  Service: General;;    RETINAL LASER PROCEDURE Bilateral 2018 or 2017    Dr. Rothman    VASCULAR SURGERY         Review of patient's allergies indicates:   Allergen Reactions    No known allergies        PTA Medications   Medication Sig    aspirin (ECOTRIN) 81 MG EC tablet Take 1 tablet (81 mg total) by mouth once daily.    atorvastatin (LIPITOR) 80 MG tablet Take 1 tablet (80 mg total) by mouth once daily.    carvediloL (COREG) 25 MG tablet Take 2 tablets (50 mg total) by mouth 2 (two) times daily with meals.    ciclopirox (PENLAC) 8 % Soln Apply topically nightly.    cinacalcet (SENSIPAR) 90 MG Tab Take 1 tablet by mouth once daily.    clopidogreL (PLAVIX) 75 mg tablet Take 1  tablet (75 mg total) by mouth once daily.    ezetimibe (ZETIA) 10 mg tablet Take 1 tablet (10 mg total) by mouth once daily.    hydrALAZINE (APRESOLINE) 50 MG tablet Take 1 tablet (50 mg total) by mouth once daily. for SBP > 140mm HG    NIFEdipine (PROCARDIA-XL) 60 MG (OSM) 24 hr tablet Take 1 tablet (60 mg total) by mouth once daily.    polyethylene glycol (GLYCOLAX) 17 gram/dose powder Use cap to measure 17 g, then mix in liquid and drink by mouth once daily.    sevelamer carbonate (RENVELA) 800 mg Tab Take 800 mg by mouth 3 (three) times daily with meals.    sildenafiL (VIAGRA) 100 MG tablet Take 1 tablet (100 mg total) by mouth daily as needed for Erectile Dysfunction (take on an empty stomach 30-60 minutes before).     Family History       Problem Relation (Age of Onset)    Cancer Maternal Grandfather    Cataracts Mother    Diabetes Mother    Heart disease Brother    Hypertension Mother, Father, Sister, Brother    Kidney disease Brother          Tobacco Use    Smoking status: Never    Smokeless tobacco: Never   Substance and Sexual Activity    Alcohol use: Not Currently     Comment: One drink a month    Drug use: No    Sexual activity: Yes     Partners: Female     Birth control/protection: None     Review of Systems   Constitutional: Negative.   HENT: Negative.     Eyes: Negative.    Cardiovascular:  Positive for chest pain.   Respiratory: Negative.     Endocrine: Negative.    Hematologic/Lymphatic: Negative.    Skin: Negative.    Musculoskeletal: Negative.    Gastrointestinal: Negative.    Genitourinary: Negative.    Neurological: Negative.      Objective:     Vital Signs (Most Recent):  Temp: 98.1 °F (36.7 °C) (03/11/24 0759)  Pulse: 73 (03/11/24 0835)  Resp: 18 (03/11/24 0759)  BP: (!) 166/86 (03/11/24 0759)  SpO2: (!) 93 % (03/11/24 0759) Vital Signs (24h Range):  Temp:  [97.7 °F (36.5 °C)-98.5 °F (36.9 °C)] 98.1 °F (36.7 °C)  Pulse:  [66-78] 73  Resp:  [17-18] 18  SpO2:  [93 %-98 %] 93 %  BP:  (146-198)/(72-89) 166/86     Weight: 72.6 kg (160 lb)  Body mass index is 25.06 kg/m².    SpO2: (!) 93 %         Intake/Output Summary (Last 24 hours) at 3/11/2024 1002  Last data filed at 3/10/2024 1848  Gross per 24 hour   Intake 490 ml   Output --   Net 490 ml       Lines/Drains/Airways       Peripheral Intravenous Line  Duration                  Hemodialysis AV Fistula Right forearm -- days         Peripheral IV - Single Lumen 03/10/24 18 G Left Antecubital 1 day                     Physical Exam  Constitutional:       Appearance: Normal appearance.   HENT:      Head: Normocephalic and atraumatic.      Nose: Nose normal.   Eyes:      Extraocular Movements: Extraocular movements intact.      Pupils: Pupils are equal, round, and reactive to light.   Cardiovascular:      Rate and Rhythm: Normal rate and regular rhythm.      Pulses: Normal pulses.      Heart sounds: Normal heart sounds.   Pulmonary:      Effort: Pulmonary effort is normal.      Breath sounds: Normal breath sounds.   Abdominal:      General: Abdomen is flat. Bowel sounds are normal.      Palpations: Abdomen is soft.   Musculoskeletal:         General: Normal range of motion.      Cervical back: Normal range of motion.   Skin:     General: Skin is warm.      Capillary Refill: Capillary refill takes less than 2 seconds.   Neurological:      General: No focal deficit present.      Mental Status: He is alert and oriented to person, place, and time.            Significant Labs: All pertinent lab results from the last 24 hours have been reviewed.    Significant Imaging: EKG: NSR with LVH with repol abnl

## 2024-03-11 NOTE — ASSESSMENT & PLAN NOTE
Patient has three-vessel coronary artery disease that was not amenable to PCI.  He has also not a candidate for CABG due to poor targets in LAD.  Echo showed normal LVEF of 55%.  Recommend to treat him medically with heparin for 48 hours.  Aggressively treat his blood pressures.  Please start him on isosorbide mononitrate(he is not taking sildenafil) and maximize his nifedipine for high blood pressures.  Also stressed with him about  being compliant with his medications for HTN and dialysis

## 2024-03-11 NOTE — HPI
47yo M with a PMH of CAD (3-vessel disease seen on [facility] in November 2023), PAD, HFpEF (60% with G3DD), HTN, HLD, DM2, ESRD, and anemia of ESRD presents to the hospital for palpitations and chest pain on Saturday and he was hyper have elevated troponins up to 24.  On presentation he also has elevated systolic blood pressures up to 200.  During hospital stay he denies having anymore chest pain.  He had an angiogram in November that showed three-vessel disease that he is not amenable to PCI.  Echo done in the hospital showed normal ejection fraction with wall motion abnormalities and also has elevated filling pressures

## 2024-03-11 NOTE — PLAN OF CARE
Problem: Adult Inpatient Plan of Care  Goal: Plan of Care Review  Outcome: Ongoing, Progressing  Goal: Patient-Specific Goal (Individualized)  Outcome: Ongoing, Progressing  Goal: Absence of Hospital-Acquired Illness or Injury  Outcome: Ongoing, Progressing     Problem: Device-Related Complication Risk (Hemodialysis)  Goal: Safe, Effective Therapy Delivery  Outcome: Ongoing, Progressing     Problem: Infection (Hemodialysis)  Goal: Absence of Infection Signs and Symptoms  Outcome: Ongoing, Progressing

## 2024-03-11 NOTE — PROGRESS NOTES
"[facility] - Transplant [facility] Medicine  Progress Note    Patient Name: Haroldo Dickinson  MRN: [id number]  Patient Class: IP- Inpatient   Admission Date: 3/10/2024  Length of Stay: 0 days  Attending Physician: Omar Marino MD  Primary Care Provider: Mireya Larose MD        Subjective:     Principal Problem:Acute coronary syndrome with high troponin        HPI:  Haroldo Dickinson is a 47yo M with a PMH of CAD (3-vessel disease seen on [facility] in November 2023), PAD, HFpEF (60% with G3DD), HTN, HLD, DM2, ESRD, and anemia of ESRD who presented to the [facility] on 3/10/2024 with complaints of chest pressure and palpitations. Pt reports that he was in his usual state of health until day PTA: he first noticed something was different when he became winded after walking from his car to the pharmacy, which was not a far distance, and he does not normally have SOB during these activities; SOB resolved, though he also noted that he was "hotter than usual" during this episode. At 11pm, while he was getting ready for bed, he developed sudden onset palpitations with associated chest pressure and pain in his left arm. Denies F/C/N/V/D/C, HA, overt CP, abdominal pain, dysuria, extremity swelling, or symptoms otherwise. He tried sitting up for a while to see if this improved his symptoms, but they persisted; he called EMS, who provided nitro sprays and ASA, which did cause symptomatic improvement. He was then brought to the ER.    In the ED, vitals significant for /88. Labs remarkable for , though were otherwise stable at baseline. Troponin negative. CXR without any acute cardiopulmonary processes. Hospital medicine was consulted for admission and further management.    Overview/Hospital Course:  Pt admitted to [facility], and noted to have marked troponin rise to 24 and 25. IC consulted: not a PCI candidate, and per Dr. Fu, also not a CABG candidate. Discussed prognosis with pt, who is not interested in palliative " care/hospice at this time. Optimizing medical management per IC.    Interval History: Pt seen and examined this morning on rounds. Discussed troponin findings and IC recs, as well as poor prognosis. At this time, he is not interested in palliative options. Continuing medical management.        Objective:     Vital Signs (Most Recent):  Temp: 98.1 °F (36.7 °C) (24 0759)  Pulse: 73 (24 0835)  Resp: 18 (24 0759)  BP: (!) 166/86 (24 0759)  SpO2: (!) 93 % (24 075) Vital Signs (24h Range):  Temp:  [97.7 °F (36.5 °C)-98.5 °F (36.9 °C)] 98.1 °F (36.7 °C)  Pulse:  [66-78] 73  Resp:  [17-18] 18  SpO2:  [93 %-98 %] 93 %  BP: (146-198)/(72-89) 166/86     Weight: 72.6 kg (160 lb)  Body mass index is 25.06 kg/m².    Intake/Output Summary (Last 24 hours) at 3/11/2024 1036  Last data filed at 3/10/2024 1848  Gross per 24 hour   Intake 490 ml   Output --   Net 490 ml         Physical Exam  Gen: in NAD, appears stated age  Neuro: AAOx4, CN2-12 grossly intact BL; motor, sensory, and strength grossly intact BL  HEENT: NTNC, EOMI, PERRLA, MMM; no thyromegaly or lymphadenopathy; no JVD appreciated  CVS: RRR, no m/r/g; S1/S2 auscultated with no S3 or S4; capillary refill < 2 sec  Resp: lungs CTAB, no w/r/r; no belabored breathing or accessory muscle use appreciated   Abd: BS+ in all 4 quadrants; NTND, soft to palpation; no organomegaly appreciated   Extrem: pulses full, equal, and regular over all 4 extremities; no UE or LE edema BL          Significant Labs: All pertinent labs within the past 24 hours have been reviewed.    Significant Imaging: I have reviewed all pertinent imaging results/findings within the past 24 hours.    Assessment/Plan:      * Acute coronary syndrome with high troponin  - Interval history and physical exam findings as described above  - Known 3-vessel disease  - Initial troponin in the ED WNL  - Troponin uprisin on last draw  - Started on heparin gtt, to completed 48h per IC  -  Per IC, not a PCI or CABG candidate  - Continue home GDMT regimen  - PRN SLNG provided  - Will continue to monitor on tele  - Pt expressed that he is not interested in palliative options at this time    PAD (peripheral artery disease)  - Continue home ASA and statin regimen    Coronary artery disease involving native coronary artery of native heart with unstable angina pectoris  - As above    Chronic diastolic heart failure  - Euvolemic  - Continue home GDMT regimen  - Will continue to monitor on tele    Hypertension associated with stage 5 chronic kidney disease due to type 2 diabetes mellitus  - Working to optimize BP control  - Continue home GDMT regimen  - Will continue to monitor and further titrate antihypertensives as clinically indicated     Hyperlipidemia associated with type 2 diabetes mellitus  - Continue home statin and zetia regimen    Type 2 diabetes mellitus with chronic kidney disease on chronic dialysis, without long-term current use of insulin  - Working to optimize BG control  - SSI provided for corrective dosing  - POCT glucose checks as ordered  - Hypoglycemic protocol in effect  - Diabetic diet provided    ESRD on hemodialysis  - Interval history and physical exam findings as described above  - Nephology consulted: further decision for HD per nephology  - Renally dosing all medications  - Avoiding nephrotoxins  - Renal diet  - Will continue to monitor on daily labs    Anemia in end-stage renal disease  - H/H stable near baseline  - Will continue to monitor on daily labs      VTE Risk Mitigation (From admission, onward)           Ordered     heparin 25,000 units in dextrose 5% (100 units/ml) IV bolus from bag LOW INTENSITY nomogram - [facility]  As needed (PRN)        Question:  Heparin Infusion Adjustment (DO NOT MODIFY ANSWER)  Answer:  [email or web address]\Images\Pharmacy\HeparinInfusions\heparin LOW INTENSITY nomogram for [facility] [id number]    03/11/24 0620     heparin 25,000 units in dextrose 5% (100  units/ml) IV bolus from bag LOW INTENSITY nomogram - [facility]  As needed (PRN)        Question:  Heparin Infusion Adjustment (DO NOT MODIFY ANSWER)  Answer:  [email or web address]\Images\Pharmacy\HeparinInfusions\heparin LOW INTENSITY nomogram for [facility] [id number].pdf    03/11/24 0620     heparin 25,000 units in dextrose 5% 250 mL (100 units/mL) infusion LOW INTENSITY nomogram - [facility]  Continuous        Question:  Begin at (units/kg/hr)  Answer:  12    03/11/24 0620     heparin (porcine) injection 1,000 Units  As needed (PRN)         03/10/24 1259     IP VTE HIGH RISK PATIENT  Once         03/10/24 0845     Place sequential compression device  Until discontinued         03/10/24 0845                    Discharge Planning   QUIN: 3/13/2024     Code Status: Full Code   Is the patient medically ready for discharge?: No    Reason for patient still in hospital (select all that apply): Patient trending condition             Omar Marino MD  Attending Physician  Medical Director - [facility]  Department of Hospital Medicine  3/11/2024

## 2024-03-11 NOTE — ASSESSMENT & PLAN NOTE
- Interval history and physical exam findings as described above  - Known 3-vessel disease  - Initial troponin in the ED WNL  - Troponin uprisin on last draw  - Started on heparin gtt, to completed 48h per IC  - Per IC, not a PCI or CABG candidate  - Continue home GDMT regimen  - PRN SLNG provided  - Will continue to monitor on tele  - Pt expressed that he is not interested in palliative options at this time

## 2024-03-11 NOTE — ACP (ADVANCE CARE PLANNING)
Advance Care Planning     Date: 03/11/2024    Today a voluntary meeting took place: bedside    Patient Participation: Patient is able to participate     Attendees (Name and  Relationship to patient):  MD and pt    Staff attendees (Name and  Role): attending    ACP Conversation (General): Understanding of current condition : advanced CAD; not a candidate for PCI or CABG. Discussed that this is likely a terminal diagnosis.    Code Status: Full Code     ACP Documents: None    Goals of care: The patient endorses that what is most important right now is to focus on symptom/pain control and extending life as long as possible, even it it means sacrificing quality    Accordingly, we have decided that the best plan to meet the patient's goals includes continuing with treatment      Recommendations/  Follow-up tasks: Other (specify below) NA       Length of ACP   conversation in minutes: 16 minutes

## 2024-03-11 NOTE — NURSING
Pt AAOX4. R/t elevated troponin pt being transferred to TSU, report called to TSU via night RN, transport requested. Bed in lowest position. Side rails up x2. Call bell and personal belongs within reach. Safety precautions maintained. Pt free of falls or injuries. No further issues or concerns at this time. Plan of care continues

## 2024-03-11 NOTE — PLAN OF CARE
[facility] - Transplant Stepdown  Initial Discharge Assessment       Primary Care Provider: Mireya Larose MD    Admission Diagnosis: Elevated brain natriuretic peptide (BNP) level [R79.89]  Chest pain, rule out acute myocardial infarction [R07.9]  Chest pain [R07.9]  ACS (acute coronary syndrome) [I24.9]    Admission Date: 3/10/2024  Expected Discharge Date: 3/13/2024    Transition of Care Barriers: Transportation    Payor: [employer] MEDICARE / Plan: [employer] PPO SPECIAL NEEDS / Product Type: Medicare Advantage /     Extended Emergency Contact Information  Primary Emergency Contact: Gabby Dickinson  Address: [address] [place] [address] [facility]  Mobile Phone: [phone]  Relation: Mother    Discharge Plan A: Home  Discharge Plan B: Home Health      [id number] - [place], [place] - [address]  [address] [address]  Phone: [phone] Fax: [phone]      Initial Assessment (most recent)       Adult Discharge Assessment - 03/11/24 1058          Discharge Assessment    Assessment Type Discharge Planning Assessment     Confirmed/corrected address, phone number and insurance Yes     Confirmed Demographics Correct on Facesheet     Source of Information patient     When was your last doctors appointment? --   in January    Communicated QUIN with patient/caregiver Date not available/Unable to determine     People in Home alone     Do you expect to return to your current living situation? Yes     Do you have help at home or someone to help you manage your care at home? No     Prior to hospitilization cognitive status: Alert/Oriented     Current cognitive status: Alert/Oriented     Walking or Climbing Stairs Difficulty no     Dressing/Bathing Difficulty no     Home Layout Able to live on 1st floor     Equipment Currently Used at Home rollator     Patient currently being followed by outpatient case management? No     Do you  currently have service(s) that help you manage your care at home? Yes     Name and Contact number of agency [facility] Nurse Pracitioner program     Do you take prescription medications? Yes     Do you have prescription coverage? Yes     Do you have any problems affording any of your prescribed medications? No     Is the patient taking medications as prescribed? yes     How do you get to doctors appointments? health plan transportation     Are you on dialysis? Yes     Dialysis Name and Scheduled days [facility]  11am M,W,F     Do you take coumadin? No     Discharge Plan A Home     Discharge Plan B Home Health     DME Needed Upon Discharge  none     Discharge Plan discussed with: Patient     Transition of Care Barriers Transportation        Physical Activity    On average, how many days per week do you engage in moderate to strenuous exercise (like a brisk walk)? 0 days     On average, how many minutes do you engage in exercise at this level? 0 min        Financial Resource Strain    How hard is it for you to pay for the very basics like food, housing, medical care, and heating? Not hard at all        Housing Stability    In the last 12 months, was there a time when you were not able to pay the mortgage or rent on time? No     In the last 12 months, was there a time when you did not have a steady place to sleep or slept in a shelter (including now)? No        Transportation Needs    In the past 12 months, has lack of transportation kept you from medical appointments or from getting medications? No     In the past 12 months, has lack of transportation kept you from meetings, work, or from getting things needed for daily living? No        Food Insecurity    Within the past 12 months, you worried that your food would run out before you got the money to buy more. Patient declined     Within the past 12 months, the food you bought just didn't last and you didn't have money to get more. Patient declined        Stress    Do you  feel stress - tense, restless, nervous, or anxious, or unable to sleep at night because your mind is troubled all the time - these days? Patient declined        Social Connections    In a typical week, how many times do you talk on the phone with family, friends, or neighbors? More than three times a week     How often do you get together with friends or relatives? More than three times a week     How often do you attend Caodaism or [employer] services? Never     Do you belong to any clubs or organizations such as Caodaism groups, unions, [employer] or athletic groups, or school groups? No     How often do you attend meetings of the clubs or organizations you belong to? Never     Are you , , , , never , or living with a partner? Patient unable to answer        Alcohol Use    Q1: How often do you have a drink containing alcohol? Patient declined     Q2: How many drinks containing alcohol do you have on a typical day when you are drinking? Patient declined     Q3: How often do you have six or more drinks on one occasion? Patient declined                      Discharge Plan A and Plan B have been determined by review of patient's clinical status, future medical and therapeutic needs, and coverage/benefits for post-acute care in coordination with multidisciplinary team members.     This CM met with patient  in room to complete DPA. Questions answered / contact numbers provided.  Use PREFERRED PHARMACY / BEDSIDE DELIVERY for any necessary medications at time of discharge. Patient is independent with all ADLs, but uses a rollator at home.  Currently patient is unsure who will be assisting with help upon discharge. Patient reports he will need help transportation upon discharge because his son doesn't have a car.  Patient is on dialysis and chair time is MWF at 11:00am with [facility]. Hospital follow up will be scheduled with PCP. Will continue to follow for course of hospitalization.      Whit Schwab RN  Case Management

## 2024-03-11 NOTE — SUBJECTIVE & OBJECTIVE
Interval History: Pt seen and examined this morning on rounds. Discussed troponin findings and IC recs, as well as poor prognosis. At this time, he is not interested in palliative options. Continuing medical management.        Objective:     Vital Signs (Most Recent):  Temp: 98.1 °F (36.7 °C) (03/11/24 0759)  Pulse: 73 (03/11/24 0835)  Resp: 18 (03/11/24 0759)  BP: (!) 166/86 (03/11/24 0759)  SpO2: (!) 93 % (03/11/24 0759) Vital Signs (24h Range):  Temp:  [97.7 °F (36.5 °C)-98.5 °F (36.9 °C)] 98.1 °F (36.7 °C)  Pulse:  [66-78] 73  Resp:  [17-18] 18  SpO2:  [93 %-98 %] 93 %  BP: (146-198)/(72-89) 166/86     Weight: 72.6 kg (160 lb)  Body mass index is 25.06 kg/m².    Intake/Output Summary (Last 24 hours) at 3/11/2024 1036  Last data filed at 3/10/2024 1848  Gross per 24 hour   Intake 490 ml   Output --   Net 490 ml         Physical Exam  Gen: in NAD, appears stated age  Neuro: AAOx4, CN2-12 grossly intact BL; motor, sensory, and strength grossly intact BL  HEENT: NTNC, EOMI, PERRLA, MMM; no thyromegaly or lymphadenopathy; no JVD appreciated  CVS: RRR, no m/r/g; S1/S2 auscultated with no S3 or S4; capillary refill < 2 sec  Resp: lungs CTAB, no w/r/r; no belabored breathing or accessory muscle use appreciated   Abd: BS+ in all 4 quadrants; NTND, soft to palpation; no organomegaly appreciated   Extrem: pulses full, equal, and regular over all 4 extremities; no UE or LE edema BL          Significant Labs: All pertinent labs within the past 24 hours have been reviewed.    Significant Imaging: I have reviewed all pertinent imaging results/findings within the past 24 hours.

## 2024-03-11 NOTE — TELEPHONE ENCOUNTER
Last Office Visit Info:   The patient's last visit with Mireya Larose MD was on 12/12/2023.    The patient's last visit in current department was on Visit date not found.        Last CBC Results:   Lab Results   Component Value Date    WBC 13.26 (H) 03/11/2024    HGB 9.0 (L) 03/11/2024    HCT 28.9 (L) 03/11/2024     03/11/2024       Last CMP Results  Lab Results   Component Value Date     03/11/2024    K 3.7 03/11/2024     03/11/2024    CO2 22 (L) 03/11/2024    BUN 43 (H) 03/11/2024    CREATININE 11.3 (H) 03/11/2024    CALCIUM 8.5 (L) 03/11/2024    ALBUMIN 3.1 (L) 03/11/2024    AST 47 (H) 03/11/2024    ALT 19 03/11/2024       Last Lipids  Lab Results   Component Value Date    CHOL 144 05/02/2023    TRIG 94 05/02/2023    HDL 51 05/02/2023    LDLCALC 74.2 05/02/2023       Last A1C  Lab Results   Component Value Date    HGBA1C 5.1 11/27/2023       Last TSH  Lab Results   Component Value Date    TSH 0.989 10/13/2015             Current Med Refills  Medication List with Changes/Refills   Current Medications    ASPIRIN (ECOTRIN) 81 MG EC TABLET    Take 1 tablet (81 mg total) by mouth once daily.       Start Date: 11/29/2023End Date: 11/28/2024    ATORVASTATIN (LIPITOR) 80 MG TABLET    Take 1 tablet (80 mg total) by mouth once daily.       Start Date: 11/30/2023End Date: 11/29/2024    CARVEDILOL (COREG) 25 MG TABLET    Take 2 tablets (50 mg total) by mouth 2 (two) times daily with meals.       Start Date: 11/29/2023End Date: 11/23/2024    CICLOPIROX (PENLAC) 8 % SOLN    Apply topically nightly.       Start Date: 9/5/2023  End Date: --    CINACALCET (SENSIPAR) 90 MG TAB    Take 1 tablet by mouth once daily.       Start Date: 12/21/2022End Date: --    CLOPIDOGREL (PLAVIX) 75 MG TABLET    Take 1 tablet (75 mg total) by mouth once daily.       Start Date: 11/30/2023End Date: 11/29/2024    EZETIMIBE (ZETIA) 10 MG TABLET    Take 1 tablet (10 mg total) by mouth once daily.       Start Date: 12/7/2023 End Date:  12/6/2024    HYDRALAZINE (APRESOLINE) 50 MG TABLET    Take 1 tablet (50 mg total) by mouth once daily. for SBP > 140mm HG       Start Date: 12/12/2023End Date: --    NIFEDIPINE (PROCARDIA-XL) 60 MG (OSM) 24 HR TABLET    Take 1 tablet (60 mg total) by mouth once daily.       Start Date: 11/30/2023End Date: 11/29/2024    POLYETHYLENE GLYCOL (GLYCOLAX) 17 GRAM/DOSE POWDER    Use cap to measure 17 g, then mix in liquid and drink by mouth once daily.       Start Date: 1/8/2023  End Date: --    SEVELAMER CARBONATE (RENVELA) 800 MG TAB    Take 800 mg by mouth 3 (three) times daily with meals.       Start Date: --        End Date: --    SILDENAFIL (VIAGRA) 100 MG TABLET    Take 1 tablet (100 mg total) by mouth daily as needed for Erectile Dysfunction (take on an empty stomach 30-60 minutes before).       Start Date: 2/15/2024 End Date: 2/14/2025       Order(s) placed per written order guidelines: none    Please advise.

## 2024-03-11 NOTE — NURSING
Pt AAOx4, VSS, afebrile. Cr 11.3. Echo with normal EF, wall motion abnormalities, increased filling pressures. Per Interventional Cardiology - not candidate for PCI or CABG. Pt is terminal, palliative care mentioned by MD. Pt would like to continue with treatment - extend life as long as possible with symptom/pain management. Pt is not interested in palliative care/hospice at this time. Plan for heparin gtt x 48H, treat elevated BP's (regimen adjusted). HD today. Heparin gtt infusing @ 14U/9.6 mL with dosing wt of 68.7 kg.  Bed in low/locked position, call light/personal belongings w/in reach, non-slip socks in place, pt remains free from falls, family at bedside, WCTM.

## 2024-03-11 NOTE — CONSULTS
[facility] Transplant Stepdown  Interventional Cardiology  Consult Note    Patient Name: Haroldo Dickinson  MRN: [id number]  Admission Date: 3/10/2024  Hospital Length of Stay: 0 days  Code Status: Full Code   Attending Provider: Omar Marino MD  Consulting Provider: Axel Pena MD  Primary Care Physician: Mireya Larose MD  Principal Problem:Acute coronary syndrome with high troponin    Patient information was obtained from patient and ER records.     Inpatient consult to Interventional Cardiology  Consult performed by: Axel Pena MD  Consult ordered by: Omar Marino MD        Subjective:     Chief Complaint:  Chest pain      HPI:  49yo M with a PMH of CAD (3-vessel disease seen on [facility] in November 2023), PAD, HFpEF (60% with G3DD), HTN, HLD, DM2, ESRD, and anemia of ESRD presents to the hospital for palpitations and chest pain on Saturday and he was hyper have elevated troponins up to 24.  On presentation he also has elevated systolic blood pressures up to 200.  During hospital stay he denies having anymore chest pain.  He had an angiogram in November that showed three-vessel disease that he is not amenable to PCI.  Echo done in the hospital showed normal ejection fraction with wall motion abnormalities and also has elevated filling pressures    Past Medical History:   Diagnosis Date    CAD (coronary artery disease), native coronary artery 11/21/2019    Cataract     Diabetes mellitus     Diabetic retinopathy     Dialysis patient     DM type 2 causing renal disease, not at goal     ESRD (end stage renal disease) started dialysis 01/2014 06/05/2014    Hyperparathyroidism, secondary renal 06/05/2014    Hypertension     NSTEMI (non-ST elevated myocardial infarction) 12/21/2013    Organ transplant candidate 06/05/2014    Pneumonia     Renal cell carcinoma of right kidney     Renal hypertension     Stroke        Past Surgical History:   Procedure Laterality Date    ANGIOGRAM, CORONARY, WITH LEFT HEART  CATHETERIZATION N/A 7/1/2021    Procedure: Angiogram, Coronary, with Left Heart Cath;  Surgeon: Miki Zendejas MD;  Location: [facility] CATH LAB;  Service: Cardiology;  Laterality: N/A;    ANGIOGRAM, CORONARY, WITH LEFT HEART CATHETERIZATION N/A 11/28/2023    Procedure: Angiogram, Coronary, with Left Heart Cath;  Surgeon: Noah Pendleton MD;  Location: [facility] CATH LAB;  Service: Cardiology;  Laterality: N/A;    COLONOSCOPY N/A 5/3/2017    Procedure: COLONOSCOPY;  Surgeon: Rufino Carpenter MD;  Location: [facility] ENDO (4TH FLR);  Service: Endoscopy;  Laterality: N/A;  pt states can only schedule on Wednesdays    COLONOSCOPY N/A 2/9/2021    Procedure: COLONOSCOPY;  Surgeon: Edil Wong MD;  Location: [facility] ENDO (4TH FLR);  Service: Endoscopy;  Laterality: N/A;  Dialysis MWF/ labwork day of procedure  right arm aceess  per Dr. Colin pt can hold Plavix 5 days prior see note- sm  COVID test on 2/6/21 at [facility] -     COLONOSCOPY N/A 2/10/2021    Procedure: COLONOSCOPY;  Surgeon: Robert Ayers MD;  Location: [facility] ENDO (4TH FLR);  Service: Endoscopy;  Laterality: N/A;  rescheduled due to poor bowel prep-BB  negative covid screening 2/6/21-BB  dialysis M-W-F-BB  okay to r/s for 2/9/21 and to hold Plavix per Dr. KIRAN Wong-BB  labs same day-BB    CORONARY STENT PLACEMENT N/A 7/25/2019    Procedure: INSERTION, STENT, CORONARY ARTERY;  Surgeon: Miki Zendejas MD;  Location: [facility] CATH LAB;  Service: Cardiology;  Laterality: N/A;    DIALYSIS FISTULA CREATION      FISTULOGRAM N/A 2/18/2019    Procedure: Fistulogram;  Surgeon: Yaneth De La Cruz MD;  Location: [facility] CATH LAB;  Service: Cardiology;  Laterality: N/A;    FISTULOGRAM Right 7/23/2019    Procedure: Fistulogram;  Surgeon: Oz Cordoba MD;  Location: [facility] CATH LAB;  Service: Cardiology;  Laterality: Right;    LAPAROSCOPIC ROBOT-ASSISTED SURGICAL REMOVAL OF KIDNEY USING DA JAIME XI Right 5/19/2022    Procedure: XI ROBOTIC NEPHRECTOMY;  Surgeon: Aidan Hendricks MD;   [facility];  Service: Urology;  Laterality: Right;  3hrs    LAPAROSCOPIC ROBOT-ASSISTED SURGICAL REMOVAL OF KIDNEY USING DA JAIME XI Left 1/5/2023    Procedure: XI ROBOTIC NEPHRECTOMY;  Surgeon: Aidan Hendricks MD;  [facility];  Service: Urology;  Laterality: Left;  4 hrs    LEFT HEART CATHETERIZATION Left 7/25/2019    Procedure: Left heart cath;  Surgeon: Miki Zendejas MD;  Location: [facility] CATH LAB;  Service: Cardiology;  Laterality: Left;    REPAIR  1/5/2023    Procedure: REPAIR; COLOTOMY;  Surgeon: Maikel Lamb MD;  Location: [address];  Service: General;;    RETINAL LASER PROCEDURE Bilateral 2018 or 2017    Dr. Rothman    VASCULAR SURGERY         Review of patient's allergies indicates:   Allergen Reactions    No known allergies        PTA Medications   Medication Sig    aspirin (ECOTRIN) 81 MG EC tablet Take 1 tablet (81 mg total) by mouth once daily.    atorvastatin (LIPITOR) 80 MG tablet Take 1 tablet (80 mg total) by mouth once daily.    carvediloL (COREG) 25 MG tablet Take 2 tablets (50 mg total) by mouth 2 (two) times daily with meals.    ciclopirox (PENLAC) 8 % Soln Apply topically nightly.    cinacalcet (SENSIPAR) 90 MG Tab Take 1 tablet by mouth once daily.    clopidogreL (PLAVIX) 75 mg tablet Take 1 tablet (75 mg total) by mouth once daily.    ezetimibe (ZETIA) 10 mg tablet Take 1 tablet (10 mg total) by mouth once daily.    hydrALAZINE (APRESOLINE) 50 MG tablet Take 1 tablet (50 mg total) by mouth once daily. for SBP > 140mm HG    NIFEdipine (PROCARDIA-XL) 60 MG (OSM) 24 hr tablet Take 1 tablet (60 mg total) by mouth once daily.    polyethylene glycol (GLYCOLAX) 17 gram/dose powder Use cap to measure 17 g, then mix in liquid and drink by mouth once daily.    sevelamer carbonate (RENVELA) 800 mg Tab Take 800 mg by mouth 3 (three) times daily with meals.    sildenafiL (VIAGRA) 100 MG tablet Take 1 tablet (100 mg total) by mouth daily as needed for Erectile  Dysfunction (take on an empty stomach 30-60 minutes before).     Family History       Problem Relation (Age of Onset)    Cancer Maternal Grandfather    Cataracts Mother    Diabetes Mother    Heart disease Brother    Hypertension Mother, Father, Sister, Brother    Kidney disease Brother          Tobacco Use    Smoking status: Never    Smokeless tobacco: Never   Substance and Sexual Activity    Alcohol use: Not Currently     Comment: One drink a month    Drug use: No    Sexual activity: Yes     Partners: Female     Birth control/protection: None     Review of Systems   Constitutional: Negative.   HENT: Negative.     Eyes: Negative.    Cardiovascular:  Positive for chest pain.   Respiratory: Negative.     Endocrine: Negative.    Hematologic/Lymphatic: Negative.    Skin: Negative.    Musculoskeletal: Negative.    Gastrointestinal: Negative.    Genitourinary: Negative.    Neurological: Negative.      Objective:     Vital Signs (Most Recent):  Temp: 98.1 °F (36.7 °C) (03/11/24 0759)  Pulse: 73 (03/11/24 0835)  Resp: 18 (03/11/24 0759)  BP: (!) 166/86 (03/11/24 0759)  SpO2: (!) 93 % (03/11/24 0759) Vital Signs (24h Range):  Temp:  [97.7 °F (36.5 °C)-98.5 °F (36.9 °C)] 98.1 °F (36.7 °C)  Pulse:  [66-78] 73  Resp:  [17-18] 18  SpO2:  [93 %-98 %] 93 %  BP: (146-198)/(72-89) 166/86     Weight: 72.6 kg (160 lb)  Body mass index is 25.06 kg/m².    SpO2: (!) 93 %         Intake/Output Summary (Last 24 hours) at 3/11/2024 1002  Last data filed at 3/10/2024 1848  Gross per 24 hour   Intake 490 ml   Output --   Net 490 ml       Lines/Drains/Airways       Peripheral Intravenous Line  Duration                  Hemodialysis AV Fistula Right forearm -- days         Peripheral IV - Single Lumen 03/10/24 18 G Left Antecubital 1 day                     Physical Exam  Constitutional:       Appearance: Normal appearance.   HENT:      Head: Normocephalic and atraumatic.      Nose: Nose normal.   Eyes:      Extraocular Movements: Extraocular  movements intact.      Pupils: Pupils are equal, round, and reactive to light.   Cardiovascular:      Rate and Rhythm: Normal rate and regular rhythm.      Pulses: Normal pulses.      Heart sounds: Normal heart sounds.   Pulmonary:      Effort: Pulmonary effort is normal.      Breath sounds: Normal breath sounds.   Abdominal:      General: Abdomen is flat. Bowel sounds are normal.      Palpations: Abdomen is soft.   Musculoskeletal:         General: Normal range of motion.      Cervical back: Normal range of motion.   Skin:     General: Skin is warm.      Capillary Refill: Capillary refill takes less than 2 seconds.   Neurological:      General: No focal deficit present.      Mental Status: He is alert and oriented to person, place, and time.            Significant Labs: All pertinent lab results from the last 24 hours have been reviewed.    Significant Imaging: EKG: NSR with LVH with repol abnl  Assessment and Plan:     Cardiac/Vascular  Coronary artery disease involving native coronary artery of native heart with unstable angina pectoris  Patient has three-vessel coronary artery disease that was not amenable to PCI.  He has also not a candidate for CABG due to poor targets in LAD.  Echo showed normal LVEF of 55%.  Recommend to treat him medically with heparin for 48 hours.  Aggressively treat his blood pressures.  Please start him on isosorbide mononitrate(he is not taking sildenafil) and maximize his nifedipine for high blood pressures.      Also stressed with him about  being compliant with his medications for HTN and dialysis        VTE Risk Mitigation (From admission, onward)           Ordered     heparin 25,000 units in dextrose 5% (100 units/ml) IV bolus from bag LOW INTENSITY nomogram - [facility]  As needed (PRN)        Question:  Heparin Infusion Adjustment (DO NOT MODIFY ANSWER)  Answer:  [email or web address]\epic\Images\Pharmacy\HeparinInfusions\heparin LOW INTENSITY nomogram for [facility] [id number].pdf    03/11/24 0620      heparin 25,000 units in dextrose 5% (100 units/ml) IV bolus from bag LOW INTENSITY nomogram - [facility]  As needed (PRN)        Question:  Heparin Infusion Adjustment (DO NOT MODIFY ANSWER)  Answer:  [email or web address]\epic\Images\Pharmacy\HeparinInfusions\heparin LOW INTENSITY nomogram for [facility] [id number].pdf    03/11/24 0620     heparin 25,000 units in dextrose 5% 250 mL (100 units/mL) infusion LOW INTENSITY nomogram - [facility]  Continuous        Question:  Begin at (units/kg/hr)  Answer:  12    03/11/24 0620     heparin (porcine) injection 1,000 Units  As needed (PRN)         03/10/24 1259     IP VTE HIGH RISK PATIENT  Once         03/10/24 0845     Place sequential compression device  Until discontinued         03/10/24 0845                    Thank you for your consult. I will sign off. Please contact us if you have any additional questions.    Axel Pena MD  Interventional Cardiology   [facility] - Transplant Stepdown

## 2024-03-11 NOTE — CARE UPDATE
On chart review this morning, troponin noted to jump from 0.092 to 24.483. Confirmed with bedside RN that no critical lab value was reported.    Initiate heparin gtt. Interventional cardiology paged.      Omar Marino MD  Attending Physician  Medical Director - [facility]  Department of Hospital Medicine  3/11/2024

## 2024-03-11 NOTE — NURSING
Dialysis tx started to the right arm AVF per orders placed by Dr Locke:      Order Questions    Question Answer Comment   Antibiotics on HD? No    Duration of Treatment 3.5 hours    Dialyzer F160    Dialysate Temperature (C) 36.5     mL/min     mL/min    K+ Potassium per Protocol    Ca++ Calcium per Protocol    Na+ Sodium per Protocol    Bicarb Bicarbonate per Protocol    Access Other (please specify) AVF RUE   Fluid Removal (L) Other (please specify) 2-3 liters as tolerated   Fluid Removal Instructions maintain SBP > 90 mmHG    Dialysate Bath Solution Protocol (DO NOT MODIFY ANSWER) \\[email or web address]\epic\Images\Pharmacy\Other\[facility] Dialysate Bath Solution Algorithm (formatted with date).pdf

## 2024-03-11 NOTE — HOSPITAL COURSE
Pt admitted to [facility], and noted to have marked troponin rise to 24 and 25. IC consulted: not a PCI candidate, and per Dr. Fu, also not a CABG candidate. Discussed prognosis with pt, who is not interested in palliative care/hospice at this time. Optimizing medical management per IC. Pt completed 48h of heparin on 3/13/2024 and was deemed appropriate for discharge; seen and examined prior to departure. Plan discussed with pt, who was agreeable and amenable; medications were discussed and reviewed, outpatient follow-up scheduled, ER precautions were given, all questions were answered to the pt's satisfaction, and Mr. Dickinson was subsequently discharged.

## 2024-03-12 LAB
ALBUMIN SERPL BCP-MCNC: 3.1 G/DL (ref 3.5–5.2)
ALP SERPL-CCNC: 81 U/L (ref 55–135)
ALT SERPL W/O P-5'-P-CCNC: 16 U/L (ref 10–44)
ANION GAP SERPL CALC-SCNC: 8 MMOL/L (ref 8–16)
APTT PPP: 33.1 SEC (ref 21–32)
APTT PPP: 37.4 SEC (ref 21–32)
APTT PPP: 46.1 SEC (ref 21–32)
AST SERPL-CCNC: 24 U/L (ref 10–40)
BASOPHILS # BLD AUTO: 0.11 K/UL (ref 0–0.2)
BASOPHILS NFR BLD: 1 % (ref 0–1.9)
BILIRUB SERPL-MCNC: 0.8 MG/DL (ref 0.1–1)
BUN SERPL-MCNC: 29 MG/DL (ref 6–20)
CALCIUM SERPL-MCNC: 8.9 MG/DL (ref 8.7–10.5)
CHLORIDE SERPL-SCNC: 105 MMOL/L (ref 95–110)
CO2 SERPL-SCNC: 24 MMOL/L (ref 23–29)
CREAT SERPL-MCNC: 8.8 MG/DL (ref 0.5–1.4)
DIFFERENTIAL METHOD BLD: ABNORMAL
EOSINOPHIL # BLD AUTO: 0.5 K/UL (ref 0–0.5)
EOSINOPHIL NFR BLD: 4.1 % (ref 0–8)
ERYTHROCYTE [DISTWIDTH] IN BLOOD BY AUTOMATED COUNT: 16.1 % (ref 11.5–14.5)
EST. GFR  (NO RACE VARIABLE): 6.9 ML/MIN/1.73 M^2
GLUCOSE SERPL-MCNC: 80 MG/DL (ref 70–110)
HCT VFR BLD AUTO: 30.6 % (ref 40–54)
HGB BLD-MCNC: 9.6 G/DL (ref 14–18)
IMM GRANULOCYTES # BLD AUTO: 0.05 K/UL (ref 0–0.04)
IMM GRANULOCYTES NFR BLD AUTO: 0.4 % (ref 0–0.5)
LYMPHOCYTES # BLD AUTO: 2.1 K/UL (ref 1–4.8)
LYMPHOCYTES NFR BLD: 18.1 % (ref 18–48)
MAGNESIUM SERPL-MCNC: 1.9 MG/DL (ref 1.6–2.6)
MCH RBC QN AUTO: 30.4 PG (ref 27–31)
MCHC RBC AUTO-ENTMCNC: 31.4 G/DL (ref 32–36)
MCV RBC AUTO: 97 FL (ref 82–98)
MONOCYTES # BLD AUTO: 1.1 K/UL (ref 0.3–1)
MONOCYTES NFR BLD: 9.9 % (ref 4–15)
NEUTROPHILS # BLD AUTO: 7.6 K/UL (ref 1.8–7.7)
NEUTROPHILS NFR BLD: 66.5 % (ref 38–73)
NRBC BLD-RTO: 0 /100 WBC
PHOSPHATE SERPL-MCNC: 3.1 MG/DL (ref 2.7–4.5)
PLATELET # BLD AUTO: 333 K/UL (ref 150–450)
PMV BLD AUTO: 10 FL (ref 9.2–12.9)
POCT GLUCOSE: 133 MG/DL (ref 70–110)
POCT GLUCOSE: 73 MG/DL (ref 70–110)
POTASSIUM SERPL-SCNC: 3.9 MMOL/L (ref 3.5–5.1)
PROT SERPL-MCNC: 7 G/DL (ref 6–8.4)
RBC # BLD AUTO: 3.16 M/UL (ref 4.6–6.2)
SODIUM SERPL-SCNC: 137 MMOL/L (ref 136–145)
TROPONIN I SERPL DL<=0.01 NG/ML-MCNC: 12.88 NG/ML (ref 0–0.03)
WBC # BLD AUTO: 11.34 K/UL (ref 3.9–12.7)

## 2024-03-12 PROCEDURE — 84484 ASSAY OF TROPONIN QUANT: CPT | Performed by: STUDENT IN AN ORGANIZED HEALTH CARE EDUCATION/TRAINING PROGRAM

## 2024-03-12 PROCEDURE — 20600001 HC STEP DOWN PRIVATE ROOM

## 2024-03-12 PROCEDURE — A4216 STERILE WATER/SALINE, 10 ML: HCPCS | Performed by: STUDENT IN AN ORGANIZED HEALTH CARE EDUCATION/TRAINING PROGRAM

## 2024-03-12 PROCEDURE — 85730 THROMBOPLASTIN TIME PARTIAL: CPT | Performed by: STUDENT IN AN ORGANIZED HEALTH CARE EDUCATION/TRAINING PROGRAM

## 2024-03-12 PROCEDURE — 63600175 PHARM REV CODE 636 W HCPCS: Performed by: STUDENT IN AN ORGANIZED HEALTH CARE EDUCATION/TRAINING PROGRAM

## 2024-03-12 PROCEDURE — 25000003 PHARM REV CODE 250: Performed by: STUDENT IN AN ORGANIZED HEALTH CARE EDUCATION/TRAINING PROGRAM

## 2024-03-12 PROCEDURE — 80053 COMPREHEN METABOLIC PANEL: CPT | Performed by: STUDENT IN AN ORGANIZED HEALTH CARE EDUCATION/TRAINING PROGRAM

## 2024-03-12 PROCEDURE — 36415 COLL VENOUS BLD VENIPUNCTURE: CPT | Performed by: STUDENT IN AN ORGANIZED HEALTH CARE EDUCATION/TRAINING PROGRAM

## 2024-03-12 PROCEDURE — 85025 COMPLETE CBC W/AUTO DIFF WBC: CPT | Performed by: STUDENT IN AN ORGANIZED HEALTH CARE EDUCATION/TRAINING PROGRAM

## 2024-03-12 PROCEDURE — 83735 ASSAY OF MAGNESIUM: CPT | Performed by: STUDENT IN AN ORGANIZED HEALTH CARE EDUCATION/TRAINING PROGRAM

## 2024-03-12 PROCEDURE — 84100 ASSAY OF PHOSPHORUS: CPT | Performed by: STUDENT IN AN ORGANIZED HEALTH CARE EDUCATION/TRAINING PROGRAM

## 2024-03-12 RX ORDER — NIFEDIPINE 30 MG/1
90 TABLET, EXTENDED RELEASE ORAL DAILY
Status: DISCONTINUED | OUTPATIENT
Start: 2024-03-12 | End: 2024-03-13 | Stop reason: HOSPADM

## 2024-03-12 RX ORDER — ISOSORBIDE MONONITRATE 30 MG/1
60 TABLET, EXTENDED RELEASE ORAL DAILY
Status: DISCONTINUED | OUTPATIENT
Start: 2024-03-12 | End: 2024-03-13 | Stop reason: HOSPADM

## 2024-03-12 RX ADMIN — SEVELAMER CARBONATE 800 MG: 800 TABLET, FILM COATED ORAL at 04:03

## 2024-03-12 RX ADMIN — Medication 10 ML: at 01:03

## 2024-03-12 RX ADMIN — CARVEDILOL 50 MG: 25 TABLET, FILM COATED ORAL at 08:03

## 2024-03-12 RX ADMIN — HEPARIN SODIUM AND DEXTROSE 20 UNITS/KG/HR: 10000; 5 INJECTION INTRAVENOUS at 04:03

## 2024-03-12 RX ADMIN — EZETIMIBE 10 MG: 10 TABLET ORAL at 08:03

## 2024-03-12 RX ADMIN — CARVEDILOL 50 MG: 25 TABLET, FILM COATED ORAL at 04:03

## 2024-03-12 RX ADMIN — NIFEDIPINE 90 MG: 30 TABLET, FILM COATED, EXTENDED RELEASE ORAL at 08:03

## 2024-03-12 RX ADMIN — HYDRALAZINE HYDROCHLORIDE 50 MG: 50 TABLET ORAL at 08:03

## 2024-03-12 RX ADMIN — CLOPIDOGREL BISULFATE 75 MG: 75 TABLET ORAL at 08:03

## 2024-03-12 RX ADMIN — ASPIRIN 81 MG: 81 TABLET, COATED ORAL at 08:03

## 2024-03-12 RX ADMIN — SEVELAMER CARBONATE 800 MG: 800 TABLET, FILM COATED ORAL at 01:03

## 2024-03-12 RX ADMIN — ATORVASTATIN CALCIUM 80 MG: 40 TABLET, FILM COATED ORAL at 08:03

## 2024-03-12 RX ADMIN — ISOSORBIDE MONONITRATE 60 MG: 30 TABLET, EXTENDED RELEASE ORAL at 08:03

## 2024-03-12 RX ADMIN — CINACALCET 90 MG: 30 TABLET, FILM COATED ORAL at 08:03

## 2024-03-12 RX ADMIN — HEPARIN SODIUM AND DEXTROSE 18 UNITS/KG/HR: 10000; 5 INJECTION INTRAVENOUS at 07:03

## 2024-03-12 RX ADMIN — HEPARIN SODIUM AND DEXTROSE 20 UNITS/KG/HR: 10000; 5 INJECTION INTRAVENOUS at 03:03

## 2024-03-12 RX ADMIN — SEVELAMER CARBONATE 800 MG: 800 TABLET, FILM COATED ORAL at 08:03

## 2024-03-12 NOTE — PLAN OF CARE
Problem: Device-Related Complication Risk (Hemodialysis)  Goal: Safe, Effective Therapy Delivery  Outcome: Ongoing, Progressing     Problem: Hemodynamic Instability (Hemodialysis)  Goal: Effective Tissue Perfusion  Outcome: Ongoing, Progressing     Problem: Infection (Hemodialysis)  Goal: Absence of Infection Signs and Symptoms  Outcome: Ongoing, Progressing       Dialysis tx ended to the right arm AVF, hemostasis achieved.    Net fluid removed: 2.5L    Unable to reach floor nurse for report, pt awaiting transport from [facility] to room 25963 by wheelchair.

## 2024-03-12 NOTE — PROGRESS NOTES
[facility] HEMODIALYSIS NOTE     Patient currently on hemodialysis for removal of uremic toxins .     Patient seen and evaluated on hemodialysis, tolerating treatment, see HD flowsheet for vitals and assessments.      No Hypotension, chest pain, shortness of breath, cramping, nausea or vomiting.     Target UF: 2.2 L    Encouraged patient to limit fluid intake to 32-40 oz per day.   No lab stick/BP intake on access site  Continue to monitor intake and output, daily weights   Please avoid gadolinium, fleets, phos-based laxatives, NSAIDs  Will follow closely and continue dialysis treatments while in-patient    Ole Reyes

## 2024-03-12 NOTE — SUBJECTIVE & OBJECTIVE
Interval History:   HD completed yesterday with 2.5 L removed. No distress on exam. Electrolytes non emergent.   IC with no plans for PCI or CABG per notes.     Review of patient's allergies indicates:   Allergen Reactions    No known allergies      Current Facility-Administered Medications   Medication Frequency    0.9%  NaCl infusion Once    acetaminophen tablet 650 mg Q4H PRN    albuterol-ipratropium 2.5 mg-0.5 mg/3 mL nebulizer solution 3 mL Q6H PRN    aluminum-magnesium hydroxide-simethicone 200-200-20 mg/5 mL suspension 30 mL QID PRN    aspirin EC tablet 81 mg Daily    atorvastatin tablet 80 mg Daily    carvediloL tablet 50 mg BID WM    cinacalcet tablet 90 mg Daily    clopidogreL tablet 75 mg Daily    dextrose 50% injection 12.5 g PRN    dextrose 50% injection 25 g PRN    ezetimibe tablet 10 mg Daily    glucagon (human recombinant) injection 1 mg PRN    glucose chewable tablet 16 g PRN    glucose chewable tablet 24 g PRN    heparin 25,000 units in dextrose 5% (100 units/ml) IV bolus from bag LOW INTENSITY nomogram - [facility] PRN    heparin 25,000 units in dextrose 5% (100 units/ml) IV bolus from bag LOW INTENSITY nomogram - [facility] PRN    heparin 25,000 units in dextrose 5% 250 mL (100 units/mL) infusion LOW INTENSITY nomogram - [facility] Continuous    hydrALAZINE injection 10 mg Q6H PRN    hydrALAZINE tablet 50 mg Daily    insulin aspart U-100 pen 0-10 Units QID (AC + HS) PRN    isosorbide mononitrate 24 hr tablet 60 mg Daily    melatonin tablet 6 mg Nightly PRN    mupirocin 2 % ointment BID    naloxone 0.4 mg/mL injection 0.02 mg PRN    NIFEdipine 24 hr tablet 90 mg Daily    nitroGLYCERIN SL tablet 0.4 mg Q5 Min PRN    ondansetron injection 4 mg Q8H PRN    polyethylene glycol packet 17 g Daily PRN    prochlorperazine injection Soln 5 mg Q6H PRN    sevelamer carbonate tablet 800 mg TID WM    simethicone chewable tablet 80 mg QID PRN    sodium chloride 0.9% bolus 250 mL 250 mL PRN    sodium chloride 0.9% flush 10 mL Q8H      Facility-Administered Medications Ordered in Other Encounters   Medication Frequency    lidocaine (PF) 10 mg/ml (1%) injection 10 mg Once       Objective:     Vital Signs (Most Recent):  Temp: 98 °F (36.7 °C) (03/12/24 0742)  Pulse: 64 (03/12/24 0742)  Resp: 18 (03/12/24 0742)  BP: (!) 170/80 (03/12/24 0742)  SpO2: 95 % (03/12/24 0742) Vital Signs (24h Range):  Temp:  [97.7 °F (36.5 °C)-98.5 °F (36.9 °C)] 98 °F (36.7 °C)  Pulse:  [61-80] 64  Resp:  [17-18] 18  SpO2:  [94 %-97 %] 95 %  BP: (123-202)/(75-94) 170/80     Weight: 72.6 kg (160 lb) (03/10/24 1242)  Body mass index is 25.06 kg/m².  Body surface area is 1.85 meters squared.    I/O last 3 completed shifts:  In: 849.6 [P.O.:480; I.V.:69.6; Other:300]  Out: 2800 [Other:2800]     Physical Exam  Vitals and nursing note reviewed.   Constitutional:       General: He is not in acute distress.     Appearance: Normal appearance. He is not ill-appearing.   HENT:      Head: Normocephalic and atraumatic.   Eyes:      General: No scleral icterus.     Pupils: Pupils are equal, round, and reactive to light.   Cardiovascular:      Rate and Rhythm: Normal rate and regular rhythm.      Pulses: Normal pulses.      Heart sounds: Normal heart sounds.      Comments: R forearm AVF  Pulmonary:      Effort: Pulmonary effort is normal. No respiratory distress.      Breath sounds: Normal breath sounds.   Abdominal:      General: Abdomen is flat. There is no distension.      Palpations: Abdomen is soft.      Tenderness: There is no abdominal tenderness.   Musculoskeletal:      Right lower leg: No edema.      Left lower leg: No edema.   Lymphadenopathy:      Cervical: No cervical adenopathy.   Skin:     General: Skin is warm and dry.      Capillary Refill: Capillary refill takes less than 2 seconds.   Neurological:      General: No focal deficit present.      Mental Status: He is alert and oriented to person, place, and time.          Significant Labs:  CBC:   Recent Labs   Lab  03/12/24  0603   WBC 11.34   RBC 3.16*   HGB 9.6*   HCT 30.6*      MCV 97   MCH 30.4   MCHC 31.4*     CMP:   Recent Labs   Lab 03/12/24  0603   GLU 80   CALCIUM 8.9   ALBUMIN 3.1*   PROT 7.0      K 3.9   CO2 24      BUN 29*   CREATININE 8.8*   ALKPHOS 81   ALT 16   AST 24   BILITOT 0.8     All labs within the past 24 hours have been reviewed.

## 2024-03-12 NOTE — PLAN OF CARE
AAOx4, independent  IV Heparin increased twice, current rate 20 units/kg/hr  No c/o pain or SOB  BP elevated, scheduled meds given, otherwise VSS, all needs met  Call light within reach.

## 2024-03-12 NOTE — ASSESSMENT & PLAN NOTE
ESRD on iHD MWF  Dr. Butt  4 hours  ALBARO AVF  EDW ~ 71 kg  No residual renal fx    Appears euvolemic on exam    Plan/Recommendations:  -Will plan for HD tomorrow for clearance and volume management.  -Will manage dialysis while IP  -continue strict I/O's  -RFP daily  -renal diet when advanced, on home phos binders and cinacalcet

## 2024-03-12 NOTE — SUBJECTIVE & OBJECTIVE
Interval History: Pt seen and examined this morning on rounds. Grossly asymptomatic today. Discussed need to continue heparin gtt today, plans for discharge tomorrow. At this time, he is not interested in palliative options. Continuing medical management.        Objective:     Vital Signs (Most Recent):  Temp: 98 °F (36.7 °C) (03/12/24 1128)  Pulse: 66 (03/12/24 1128)  Resp: 18 (03/12/24 1128)  BP: (!) 173/81 (03/12/24 1128)  SpO2: 95 % (03/12/24 1128) Vital Signs (24h Range):  Temp:  [97.7 °F (36.5 °C)-98.5 °F (36.9 °C)] 98 °F (36.7 °C)  Pulse:  [61-80] 66  Resp:  [17-18] 18  SpO2:  [94 %-97 %] 95 %  BP: (123-202)/(75-93) 173/81     Weight: 72.6 kg (160 lb)  Body mass index is 25.06 kg/m².    Intake/Output Summary (Last 24 hours) at 3/12/2024 1257  Last data filed at 3/11/2024 2209  Gross per 24 hour   Intake 849.63 ml   Output 2800 ml   Net -1950.37 ml         Physical Exam  Gen: in NAD, appears stated age  Neuro: AAOx4, CN2-12 grossly intact BL; motor, sensory, and strength grossly intact BL  HEENT: NTNC, EOMI, PERRLA, MMM; no thyromegaly or lymphadenopathy; no JVD appreciated  CVS: RRR, no m/r/g; S1/S2 auscultated with no S3 or S4; capillary refill < 2 sec  Resp: lungs CTAB, no w/r/r; no belabored breathing or accessory muscle use appreciated   Abd: BS+ in all 4 quadrants; NTND, soft to palpation; no organomegaly appreciated   Extrem: pulses full, equal, and regular over all 4 extremities; no UE or LE edema BL          Significant Labs: All pertinent labs within the past 24 hours have been reviewed.    Significant Imaging: I have reviewed all pertinent imaging results/findings within the past 24 hours.

## 2024-03-12 NOTE — PROGRESS NOTES
"[facility] - Transplant [facility] Medicine  Progress Note    Patient Name: Haroldo Dickinson  MRN: [id number]  Patient Class: IP- Inpatient   Admission Date: 3/10/2024  Length of Stay: 1 days  Attending Physician: Omar Marino MD  Primary Care Provider: Mireya Larose MD        Subjective:     Principal Problem:Acute coronary syndrome with high troponin        HPI:  Haroldo Dickinson is a 47yo M with a PMH of CAD (3-vessel disease seen on [facility] in November 2023), PAD, HFpEF (60% with G3DD), HTN, HLD, DM2, ESRD, and anemia of ESRD who [place] [facility] [place] with complaints of chest pressure and palpitations. Pt reports that he was in his usual state of health until day PTA: he first noticed something was different when he became winded after walking from his car to the pharmacy, which was not a far distance, and he does not normally have SOB during these activities; SOB resolved, though he also noted that he was "hotter than usual" during this episode. At 11pm, while he was getting ready for bed, he developed sudden onset palpitations with associated chest pressure and pain in his left arm. Denies F/C/N/V/D/C, HA, overt CP, abdominal pain, dysuria, extremity swelling, or symptoms otherwise. He tried sitting up for a while to see if this improved his symptoms, but they persisted; he called EMS, who provided nitro sprays and ASA, which did cause symptomatic improvement. He was then brought to the ER.    In the ED, vitals significant for /88. Labs remarkable for , though were otherwise stable at baseline. Troponin negative. CXR without any acute cardiopulmonary processes. Hospital medicine was consulted for admission and further management.    Overview/Hospital Course:  [place] [facility] [place] and noted to have marked troponin rise to 24 and 25. IC consulted: not a PCI candidate, and per Dr. Fu, also not a CABG candidate. Discussed prognosis with pt, who is not interested in palliative " care/hospice at this time. Optimizing medical management per IC.    Interval History: Pt seen and examined this morning on rounds. Grossly asymptomatic today. Discussed need to continue heparin gtt today, plans for discharge tomorrow. At this time, he is not interested in palliative options. Continuing medical management.        Objective:     Vital Signs (Most Recent):  Temp: 98 °F (36.7 °C) (24 1128)  Pulse: 66 (24 1128)  Resp: 18 (24 1128)  BP: (!) 173/81 (24 1128)  SpO2: 95 % (24 1128) Vital Signs (24h Range):  Temp:  [97.7 °F (36.5 °C)-98.5 °F (36.9 °C)] 98 °F (36.7 °C)  Pulse:  [61-80] 66  Resp:  [17-18] 18  SpO2:  [94 %-97 %] 95 %  BP: (123-202)/(75-93) 173/81     Weight: 72.6 kg (160 lb)  Body mass index is 25.06 kg/m².    Intake/Output Summary (Last 24 hours) at 3/12/2024 1257  Last data filed at 3/11/2024 2209  Gross per 24 hour   Intake 849.63 ml   Output 2800 ml   Net -1950.37 ml         Physical Exam  Gen: in NAD, appears stated age  Neuro: AAOx4, CN2-12 grossly intact BL; motor, sensory, and strength grossly intact BL  HEENT: NTNC, EOMI, PERRLA, MMM; no thyromegaly or lymphadenopathy; no JVD appreciated  CVS: RRR, no m/r/g; S1/S2 auscultated with no S3 or S4; capillary refill < 2 sec  Resp: lungs CTAB, no w/r/r; no belabored breathing or accessory muscle use appreciated   Abd: BS+ in all 4 quadrants; NTND, soft to palpation; no organomegaly appreciated   Extrem: pulses full, equal, and regular over all 4 extremities; no UE or LE edema BL          Significant Labs: All pertinent labs within the past 24 hours have been reviewed.    Significant Imaging: I have reviewed all pertinent imaging results/findings within the past 24 hours.    Assessment/Plan:      * Acute coronary syndrome with high troponin  - Interval history and physical exam findings as described above  - Known 3-vessel disease  - Initial troponin in the ED WNL  - Troponin uprisin on last draw  - Started on  heparin gtt, to completed 48h per IC  - Per IC, not a PCI or CABG candidate  - Continue home GDMT regimen  - PRN SLNG provided  - Will continue to monitor on tele  - Pt expressed that he is not interested in palliative options at this time    PAD (peripheral artery disease)  - Continue home ASA and statin regimen    Coronary artery disease involving native coronary artery of native heart with unstable angina pectoris  - As above    Chronic diastolic heart failure  - Euvolemic  - Continue home GDMT regimen  - Will continue to monitor on tele    Hypertension associated with stage 5 chronic kidney disease due to type 2 diabetes mellitus  - Working to optimize BP control  - Continue home GDMT regimen  - Will continue to monitor and further titrate antihypertensives as clinically indicated     Hyperlipidemia associated with type 2 diabetes mellitus  - Continue home statin and zetia regimen    Type 2 diabetes mellitus with chronic kidney disease on chronic dialysis, without long-term current use of insulin  - Working to optimize BG control  - SSI provided for corrective dosing  - POCT glucose checks as ordered  - Hypoglycemic protocol in effect  - Diabetic diet provided    ESRD on hemodialysis  - Interval history and physical exam findings as described above  - Nephology consulted: further decision for HD per nephology  - Renally dosing all medications  - Avoiding nephrotoxins  - Renal diet  - Will continue to monitor on daily labs    Anemia in end-stage renal disease  - H/H stable near baseline  - Will continue to monitor on daily labs      VTE Risk Mitigation (From admission, onward)           Ordered     heparin 25,000 units in dextrose 5% (100 units/ml) IV bolus from bag LOW INTENSITY nomogram - [facility]  As needed (PRN)        Question:  Heparin Infusion Adjustment (DO NOT MODIFY ANSWER)  Answer:  [email or web address]\Images\Pharmacy\HeparinInfusions\heparin LOW INTENSITY nomogram for [facility] [id number].pdf    03/11/24 0620      heparin 25,000 units in dextrose 5% (100 units/ml) IV bolus from bag LOW INTENSITY nomogram - [facility]  As needed (PRN)        Question:  Heparin Infusion Adjustment (DO NOT MODIFY ANSWER)  Answer:  [email or web address]\Images\Pharmacy\HeparinInfusions\heparin LOW INTENSITY nomogram for [facility] [id number].pdf    03/11/24 0620     heparin 25,000 units in dextrose 5% 250 mL (100 units/mL) infusion LOW INTENSITY nomogram - [facility]  Continuous        Question:  Begin at (units/kg/hr)  Answer:  12    03/11/24 0620     heparin (porcine) injection 1,000 Units  As needed (PRN)         03/10/24 1259     IP VTE HIGH RISK PATIENT  Once         03/10/24 0845     Place sequential compression device  Until discontinued         03/10/24 0845                    Discharge Planning   QUIN: 3/13/2024     Code Status: Full Code   Is the patient medically ready for discharge?: No    Reason for patient still in hospital (select all that apply): Patient trending condition  Discharge Plan A: Home        Omar Marino MD  Attending Physician  Medical Director - [facility]  Department of Hospital Medicine  3/12/2024

## 2024-03-12 NOTE — PROGRESS NOTES
[facility] - Transplant Stepdown  Nephrology  Progress Note    Patient Name: Haroldo Dickinson  MRN: [id number]  Admission Date: 3/10/2024  Hospital Length of Stay: 1 days  Attending Provider: Omar Marino MD   Primary Care Physician: Mireya Larose MD  Principal Problem:Acute coronary syndrome with high troponin    Subjective:     Interval History:   HD completed yesterday with 2.5 L removed. No distress on exam. Electrolytes non emergent.   IC with no plans for PCI or CABG per notes.     Review of patient's allergies indicates:   Allergen Reactions    No known allergies      Current Facility-Administered Medications   Medication Frequency    0.9%  NaCl infusion Once    acetaminophen tablet 650 mg Q4H PRN    albuterol-ipratropium 2.5 mg-0.5 mg/3 mL nebulizer solution 3 mL Q6H PRN    aluminum-magnesium hydroxide-simethicone 200-200-20 mg/5 mL suspension 30 mL QID PRN    aspirin EC tablet 81 mg Daily    atorvastatin tablet 80 mg Daily    carvediloL tablet 50 mg BID WM    cinacalcet tablet 90 mg Daily    clopidogreL tablet 75 mg Daily    dextrose 50% injection 12.5 g PRN    dextrose 50% injection 25 g PRN    ezetimibe tablet 10 mg Daily    glucagon (human recombinant) injection 1 mg PRN    glucose chewable tablet 16 g PRN    glucose chewable tablet 24 g PRN    heparin 25,000 units in dextrose 5% (100 units/ml) IV bolus from bag LOW INTENSITY nomogram - [facility] PRN    heparin 25,000 units in dextrose 5% (100 units/ml) IV bolus from bag LOW INTENSITY nomogram - [facility] PRN    heparin 25,000 units in dextrose 5% 250 mL (100 units/mL) infusion LOW INTENSITY nomogram - [facility] Continuous    hydrALAZINE injection 10 mg Q6H PRN    hydrALAZINE tablet 50 mg Daily    insulin aspart U-100 pen 0-10 Units QID (AC + HS) PRN    isosorbide mononitrate 24 hr tablet 60 mg Daily    melatonin tablet 6 mg Nightly PRN    mupirocin 2 % ointment BID    naloxone 0.4 mg/mL injection 0.02 mg PRN    NIFEdipine 24 hr tablet 90 mg Daily    nitroGLYCERIN SL tablet  0.4 mg Q5 Min PRN    ondansetron injection 4 mg Q8H PRN    polyethylene glycol packet 17 g Daily PRN    prochlorperazine injection Soln 5 mg Q6H PRN    sevelamer carbonate tablet 800 mg TID WM    simethicone chewable tablet 80 mg QID PRN    sodium chloride 0.9% bolus 250 mL 250 mL PRN    sodium chloride 0.9% flush 10 mL Q8H     Facility-Administered Medications Ordered in Other Encounters   Medication Frequency    lidocaine (PF) 10 mg/ml (1%) injection 10 mg Once       Objective:     Vital Signs (Most Recent):  Temp: 98 °F (36.7 °C) (03/12/24 0742)  Pulse: 64 (03/12/24 0742)  Resp: 18 (03/12/24 0742)  BP: (!) 170/80 (03/12/24 0742)  SpO2: 95 % (03/12/24 0742) Vital Signs (24h Range):  Temp:  [97.7 °F (36.5 °C)-98.5 °F (36.9 °C)] 98 °F (36.7 °C)  Pulse:  [61-80] 64  Resp:  [17-18] 18  SpO2:  [94 %-97 %] 95 %  BP: (123-202)/(75-94) 170/80     Weight: 72.6 kg (160 lb) (03/10/24 1242)  Body mass index is 25.06 kg/m².  Body surface area is 1.85 meters squared.    I/O last 3 completed shifts:  In: 849.6 [P.O.:480; I.V.:69.6; Other:300]  Out: 2800 [Other:2800]     Physical Exam  Vitals and nursing note reviewed.   Constitutional:       General: He is not in acute distress.     Appearance: Normal appearance. He is not ill-appearing.   HENT:      Head: Normocephalic and atraumatic.   Eyes:      General: No scleral icterus.     Pupils: Pupils are equal, round, and reactive to light.   Cardiovascular:      Rate and Rhythm: Normal rate and regular rhythm.      Pulses: Normal pulses.      Heart sounds: Normal heart sounds.      Comments: R forearm AVF  Pulmonary:      Effort: Pulmonary effort is normal. No respiratory distress.      Breath sounds: Normal breath sounds.   Abdominal:      General: Abdomen is flat. There is no distension.      Palpations: Abdomen is soft.      Tenderness: There is no abdominal tenderness.   Musculoskeletal:      Right lower leg: No edema.      Left lower leg: No edema.   Lymphadenopathy:       Cervical: No cervical adenopathy.   Skin:     General: Skin is warm and dry.      Capillary Refill: Capillary refill takes less than 2 seconds.   Neurological:      General: No focal deficit present.      Mental Status: He is alert and oriented to person, place, and time.          Significant Labs:  CBC:   Recent Labs   Lab 03/12/24  0603   WBC 11.34   RBC 3.16*   HGB 9.6*   HCT 30.6*      MCV 97   MCH 30.4   MCHC 31.4*     CMP:   Recent Labs   Lab 03/12/24  0603   GLU 80   CALCIUM 8.9   ALBUMIN 3.1*   PROT 7.0      K 3.9   CO2 24      BUN 29*   CREATININE 8.8*   ALKPHOS 81   ALT 16   AST 24   BILITOT 0.8     All labs within the past 24 hours have been reviewed.   Assessment/Plan:     Cardiac/Vascular  * Acute coronary syndrome with high troponin  - defer to primary team   No plans for PCI or CABG per IC note    Renal/  ESRD on hemodialysis  ESRD on iHD MWF  Dr. Butt  4 hours  ALBARO AVF  EDW ~ 71 kg  No residual renal fx    Appears euvolemic on exam    Plan/Recommendations:  -Will plan for HD tomorrow for clearance and volume management.  -Will manage dialysis while IP  -continue strict I/O's  -RFP daily  -renal diet when advanced, on home phos binders and cinacalcet        Thank you for your consult. I will follow-up with patient. Please contact us if you have any additional questions.    Avis Mancilla DNP, FNP-C  Nephrology  [facility] - Trans[facility] Stepdown

## 2024-03-12 NOTE — PLAN OF CARE
Pt aaox4 vswnl and no c/o pain. Bed in low position and callbell within reach. Pt to HD on dayshift. Heparin gtt at 14u/kg/hr and ptt at 2100=33 and gtt increased 2units to 16u/kg/hr per nomogram. Next ptt at 0400. Rt ua + + . Accu ck at 8930=783. Troponin at 2000=15.5 increased from 13.6. PT AMBULATES INDEPENDENTLY.

## 2024-03-13 ENCOUNTER — TELEPHONE (OUTPATIENT)
Dept: CARDIOLOGY | Facility: CLINIC | Age: 49
End: 2024-03-13
Payer: MEDICARE

## 2024-03-13 VITALS
HEART RATE: 62 BPM | TEMPERATURE: 98 F | RESPIRATION RATE: 19 BRPM | SYSTOLIC BLOOD PRESSURE: 163 MMHG | HEIGHT: 67 IN | OXYGEN SATURATION: 95 % | DIASTOLIC BLOOD PRESSURE: 90 MMHG | BODY MASS INDEX: 25.3 KG/M2 | WEIGHT: 161.19 LBS

## 2024-03-13 LAB
ALBUMIN SERPL BCP-MCNC: 3.1 G/DL (ref 3.5–5.2)
ALP SERPL-CCNC: 77 U/L (ref 55–135)
ALT SERPL W/O P-5'-P-CCNC: 12 U/L (ref 10–44)
ANION GAP SERPL CALC-SCNC: 10 MMOL/L (ref 8–16)
APTT PPP: 40.1 SEC (ref 21–32)
AST SERPL-CCNC: 17 U/L (ref 10–40)
BASOPHILS # BLD AUTO: 0.12 K/UL (ref 0–0.2)
BASOPHILS NFR BLD: 1.1 % (ref 0–1.9)
BILIRUB SERPL-MCNC: 0.8 MG/DL (ref 0.1–1)
BUN SERPL-MCNC: 43 MG/DL (ref 6–20)
CALCIUM SERPL-MCNC: 9 MG/DL (ref 8.7–10.5)
CHLORIDE SERPL-SCNC: 103 MMOL/L (ref 95–110)
CO2 SERPL-SCNC: 22 MMOL/L (ref 23–29)
CREAT SERPL-MCNC: 11.1 MG/DL (ref 0.5–1.4)
DIFFERENTIAL METHOD BLD: ABNORMAL
EOSINOPHIL # BLD AUTO: 0.5 K/UL (ref 0–0.5)
EOSINOPHIL NFR BLD: 4.9 % (ref 0–8)
ERYTHROCYTE [DISTWIDTH] IN BLOOD BY AUTOMATED COUNT: 16.6 % (ref 11.5–14.5)
EST. GFR  (NO RACE VARIABLE): 5.2 ML/MIN/1.73 M^2
GLUCOSE SERPL-MCNC: 79 MG/DL (ref 70–110)
HCT VFR BLD AUTO: 31.1 % (ref 40–54)
HGB BLD-MCNC: 10.1 G/DL (ref 14–18)
IMM GRANULOCYTES # BLD AUTO: 0.04 K/UL (ref 0–0.04)
IMM GRANULOCYTES NFR BLD AUTO: 0.4 % (ref 0–0.5)
LYMPHOCYTES # BLD AUTO: 2.3 K/UL (ref 1–4.8)
LYMPHOCYTES NFR BLD: 21.4 % (ref 18–48)
MAGNESIUM SERPL-MCNC: 2 MG/DL (ref 1.6–2.6)
MCH RBC QN AUTO: 31.7 PG (ref 27–31)
MCHC RBC AUTO-ENTMCNC: 32.5 G/DL (ref 32–36)
MCV RBC AUTO: 98 FL (ref 82–98)
MONOCYTES # BLD AUTO: 1.1 K/UL (ref 0.3–1)
MONOCYTES NFR BLD: 10.1 % (ref 4–15)
NEUTROPHILS # BLD AUTO: 6.5 K/UL (ref 1.8–7.7)
NEUTROPHILS NFR BLD: 62.1 % (ref 38–73)
NRBC BLD-RTO: 0 /100 WBC
PHOSPHATE SERPL-MCNC: 4.2 MG/DL (ref 2.7–4.5)
PLATELET # BLD AUTO: 324 K/UL (ref 150–450)
PMV BLD AUTO: 9.1 FL (ref 9.2–12.9)
POTASSIUM SERPL-SCNC: 4 MMOL/L (ref 3.5–5.1)
PROT SERPL-MCNC: 6.9 G/DL (ref 6–8.4)
RBC # BLD AUTO: 3.19 M/UL (ref 4.6–6.2)
SODIUM SERPL-SCNC: 135 MMOL/L (ref 136–145)
WBC # BLD AUTO: 10.51 K/UL (ref 3.9–12.7)

## 2024-03-13 PROCEDURE — 63600175 PHARM REV CODE 636 W HCPCS: Performed by: STUDENT IN AN ORGANIZED HEALTH CARE EDUCATION/TRAINING PROGRAM

## 2024-03-13 PROCEDURE — 90935 HEMODIALYSIS ONE EVALUATION: CPT | Mod: ,,, | Performed by: NURSE PRACTITIONER

## 2024-03-13 PROCEDURE — 25000003 PHARM REV CODE 250: Performed by: NURSE PRACTITIONER

## 2024-03-13 PROCEDURE — 85025 COMPLETE CBC W/AUTO DIFF WBC: CPT | Performed by: STUDENT IN AN ORGANIZED HEALTH CARE EDUCATION/TRAINING PROGRAM

## 2024-03-13 PROCEDURE — 80053 COMPREHEN METABOLIC PANEL: CPT | Performed by: STUDENT IN AN ORGANIZED HEALTH CARE EDUCATION/TRAINING PROGRAM

## 2024-03-13 PROCEDURE — 85730 THROMBOPLASTIN TIME PARTIAL: CPT | Performed by: STUDENT IN AN ORGANIZED HEALTH CARE EDUCATION/TRAINING PROGRAM

## 2024-03-13 PROCEDURE — 25000003 PHARM REV CODE 250: Performed by: STUDENT IN AN ORGANIZED HEALTH CARE EDUCATION/TRAINING PROGRAM

## 2024-03-13 PROCEDURE — 83735 ASSAY OF MAGNESIUM: CPT | Performed by: STUDENT IN AN ORGANIZED HEALTH CARE EDUCATION/TRAINING PROGRAM

## 2024-03-13 PROCEDURE — 36415 COLL VENOUS BLD VENIPUNCTURE: CPT | Performed by: STUDENT IN AN ORGANIZED HEALTH CARE EDUCATION/TRAINING PROGRAM

## 2024-03-13 PROCEDURE — 84100 ASSAY OF PHOSPHORUS: CPT | Performed by: STUDENT IN AN ORGANIZED HEALTH CARE EDUCATION/TRAINING PROGRAM

## 2024-03-13 PROCEDURE — 80100016 HC MAINTENANCE HEMODIALYSIS

## 2024-03-13 RX ORDER — NITROGLYCERIN 0.4 MG/1
0.4 TABLET SUBLINGUAL EVERY 5 MIN PRN
Qty: 100 TABLET | Refills: 0 | Status: ON HOLD | OUTPATIENT
Start: 2024-03-13 | End: 2024-06-12

## 2024-03-13 RX ORDER — SODIUM CHLORIDE 9 MG/ML
INJECTION, SOLUTION INTRAVENOUS ONCE
Status: COMPLETED | OUTPATIENT
Start: 2024-03-13 | End: 2024-03-13

## 2024-03-13 RX ORDER — HYDRALAZINE HYDROCHLORIDE 50 MG/1
50 TABLET, FILM COATED ORAL DAILY
Qty: 90 TABLET | Refills: 0 | Status: SHIPPED | OUTPATIENT
Start: 2024-03-13 | End: 2024-04-04 | Stop reason: SDUPTHER

## 2024-03-13 RX ORDER — NIFEDIPINE 90 MG/1
90 TABLET, EXTENDED RELEASE ORAL DAILY
Qty: 90 TABLET | Refills: 0 | Status: SHIPPED | OUTPATIENT
Start: 2024-03-13 | End: 2024-06-02 | Stop reason: SDUPTHER

## 2024-03-13 RX ORDER — ISOSORBIDE MONONITRATE 60 MG/1
60 TABLET, EXTENDED RELEASE ORAL DAILY
Qty: 90 TABLET | Refills: 0 | Status: SHIPPED | OUTPATIENT
Start: 2024-03-13 | End: 2024-06-02 | Stop reason: SDUPTHER

## 2024-03-13 RX ADMIN — SEVELAMER CARBONATE 800 MG: 800 TABLET, FILM COATED ORAL at 01:03

## 2024-03-13 RX ADMIN — HYDRALAZINE HYDROCHLORIDE 50 MG: 50 TABLET ORAL at 10:03

## 2024-03-13 RX ADMIN — SODIUM CHLORIDE: 9 INJECTION, SOLUTION INTRAVENOUS at 08:03

## 2024-03-13 RX ADMIN — EZETIMIBE 10 MG: 10 TABLET ORAL at 08:03

## 2024-03-13 RX ADMIN — ATORVASTATIN CALCIUM 80 MG: 40 TABLET, FILM COATED ORAL at 08:03

## 2024-03-13 RX ADMIN — ISOSORBIDE MONONITRATE 60 MG: 30 TABLET, EXTENDED RELEASE ORAL at 10:03

## 2024-03-13 RX ADMIN — CINACALCET 90 MG: 30 TABLET, FILM COATED ORAL at 08:03

## 2024-03-13 RX ADMIN — MUPIROCIN: 20 OINTMENT TOPICAL at 08:03

## 2024-03-13 RX ADMIN — CARVEDILOL 50 MG: 25 TABLET, FILM COATED ORAL at 10:03

## 2024-03-13 RX ADMIN — ASPIRIN 81 MG: 81 TABLET, COATED ORAL at 08:03

## 2024-03-13 RX ADMIN — CLOPIDOGREL BISULFATE 75 MG: 75 TABLET ORAL at 08:03

## 2024-03-13 RX ADMIN — NIFEDIPINE 90 MG: 30 TABLET, FILM COATED, EXTENDED RELEASE ORAL at 10:03

## 2024-03-13 NOTE — PLAN OF CARE
Pt aaox4. Independent and ambulatory. VSS. Pt denies chest pain. Continue c heparin gtt with plans to stop today. PTT currently at therapeutic level. Troponion trending down since admit. HD in AM. Possible d/c today.

## 2024-03-13 NOTE — PROGRESS NOTES
[facility] HEMODIALYSIS NOTE    Haroldo Dickinson is a 48 y.o. male currently on hemodialysis for removal of uremic toxins and volume.     Patient seen and evaluated on hemodialysis, tolerating treatment, see HD flowsheet for vitals and assessments.    No Hypotension, chest pain, shortness of breath, cramping, nausea or vomiting.      Labs have been reviewed and the dialysate bath has been adjusted.     Labs:     Recent Labs   Lab 03/11/24 0230 03/12/24  0603 03/13/24  0614    137 135*   K 3.7 3.9 4.0    105 103   CO2 22* 24 22*   BUN 43* 29* 43*   CREATININE 11.3* 8.8* 11.1*   CALCIUM 8.5* 8.9 9.0   PHOS 3.3 3.1 4.2     Recent Labs   Lab 03/11/24 0230 03/12/24  0603 03/13/24  0614   WBC 13.26* 11.34 10.51   HGB 9.0* 9.6* 10.1*   HCT 28.9* 30.6* 31.1*    333 324     Lab Results   Component Value Date    FESATURATED 66 (H) 11/27/2023    FERRITIN 1,531 (H) 11/27/2023        Assessment/Plan      Ultrafiltration goal: Liters: 2L. Duration:  3.5 hrs     Monday/Wednesday/Friday    - Seen on dialysis today, tolerating session with current UFR, no complications.    - No distress on exam. No co CP. Plans for dc today.    - Will attempt pre/post weights with HD to prevent hemodynamic instability.   - Encouraged patient to limit fluid intake to 32 oz per day.   - No lab stick/BP intake on access site  - Continue to monitor intake and output, daily weights   - Please avoid gadolinium, fleets, phos-based laxatives, NSAIDs  - Will follow closely and continue dialysis treatments while in-patient    Anemia  - Hgb 10.1.  - EPO can be administered and dosed per his OP unit upon discharge.    BMM  - Renal diet with protein intake goal 1.5 g/kg/d if appropriate   - Novasource with meals   - F/U PO4, Mg, Calcium. And albumin levels.   - Phos 4.2. Please continue Sevelamer.     HTN  - BP Elevated  - Goal for BP <140mmHg SBP and <90 mmHg DBP. Maintain MAP > 65 mmHg.  - Continue home antihypertensive regimen;  adjust as needed.       Avis Mancilla DNP, APRN, FNP-C  Nephrology Department  Pager:  [phone]

## 2024-03-13 NOTE — NURSING
Patient returned from dialysis VIA wheelchair, VSS.  IV and tele removed. Discharge meds delivered to patient bedside. AVS reviewed with patient, patient verbalized understanding. Patient to be transported home by personal vehicle.     03/13/24 0812   Pain/Comfort/Sleep   Preferred Pain Scale word (verbal rating pain scale)   Comfort/Acceptable Pain Level 0   Pain Rating (0-10): Rest 0   Pain Rating: Rest 0 - no pain   Cognitive   Cognitive/Neuro/Behavioral WDL WDL   Level of Consciousness (AVPU) alert   Arousal Level opens eyes spontaneously   Orientation oriented x 4   Speech clear/fluent;follows commands   Mood/Behavior calm;cooperative   Pupils   Pupil PERRLA yes   Ester Coma Scale   Best Eye Response 4-->(E4) spontaneous   Best Motor Response 6-->(M6) obeys commands   Best Verbal Response 5-->(V5) oriented   Ester Coma Scale Score 15   HEENT   HEENT WDL WDL   Head Symptoms no tenderness;no swelling   Face Symptoms no swelling or tenderness   Left Vision Change vision change denied   Right Vision Change vision change denied   Left Hearing Change hearing change denied   Right Hearing Change hearing change denied   Throat Signs/Symptoms no discomfort;no redness;no swelling;speaks without hoarseness   Neck WDL   Neck WDL WDL   Mouth/Teeth WDL   Mouth/Teeth WDL WDL   Respiratory   Respiratory WDL WDL   Rhythm/Pattern, Respiratory unlabored;pattern regular;depth regular;chest wiggle adequate;no shortness of breath reported   Expansion/Accessory Muscles/Retractions no retractions;no use of accessory muscles   Nailbeds no discoloration   Mucous Membranes pink;intact;moist   Cough Frequency no cough   Cough And Deep Breathing done independently per patient   Breath Sounds   Breath Sounds All Fields   All Lung Fields Breath Sounds Anterior:;clear   Cardiac   Cardiac WDL WDL   Cardiac Rhythm apical pulse regular;radial pulse regular   Peripheral Neurovascular   Peripheral Neurovascular WDL WDL   VTE Required Core  Measure Pharmacological prophylaxis initiated/maintained   VTE Prevention/Management ambulation promoted   Pulse Radial   Left Radial Pulse 2+ (normal)   Right Radial Pulse 2+ (normal)   Pulse Dorsalis Pedis   Left Dorsalis Pedis Pulse 2+ (normal)   Right Dorsalis Pedis Pulse 2+ (normal)   Edema   Edema dependent;generalized   Dependent Edema 0 (None Present)   Generalized Edema 0 (None Present)        Peripheral IV - Single Lumen 03/10/24 18 G Left Antecubital   Placement Date: 03/10/24   Present Prior to Hospital Arrival?: Yes  Inserted by: EMS  Size/Length: 18 G  Orientation: Left  Location: Antecubital   Site Assessment Clean;Intact;Dry;No redness;No swelling;No warmth;No drainage   Extremity Assessment Distal to IV No abnormal discoloration;No redness;No swelling;No warmth   Line Status Infusing   Dressing Status Clean;Dry;Intact   Dressing Intervention Integrity maintained        Hemodialysis AV Fistula Right forearm   No Placement Date or Time found.   Present Prior to Hospital Arrival?: Yes  Hand Hygiene: Performed  Location: Right forearm   Site Assessment Clean;Dry;Intact   Patency Present;Thrill;Bruit   Status Deaccessed   Site Condition No complications   Skin   Skin WDL WDL   Skin Color/Characteristics without discoloration   Skin Temperature warm   Skin Moisture dry   Skin Elasticity quick return to original state   Skin Integrity intact   Bed Support Surface Assessed   Joel Risk Assessment   Sensory Perception 4-->no impairment   Moisture 4-->rarely moist   Activity 3-->walks occasionally   Mobility 4-->no limitation   Nutrition 3-->adequate   Friction and Shear 3-->no apparent problem   Joel Score 21   Musculoskeletal   Musculoskeletal WDL WDL   General Mobility no overt deficits noted   Range of Motion active ROM (range of motion) encouraged   Functional Screen (every 3 days/change)   Ambulation 0 - independent   Transferring 0 - independent   Toileting 0 - independent   Bathing 0 - independent    Dressing 0 - independent   Eating 0 - independent   Communication 0 - understands/communicates without difficulty   Swallowing 0 - swallows foods/liquids without difficulty   Gastrointestinal   GI WDL WDL   Abdominal Appearance nondistended;rounded   GI Signs/Symptoms no gastrointestinal signs/symptoms   Last Bowel Movement 03/12/24   Genitourinary   Genitourinary WDL ex;voiding ability/characteristics   Voiding Characteristics patient on hemodialysis   Coping/Psychosocial   Observed Emotional State accepting;calm;cooperative;pleasant   Verbalized Emotional State acceptance   Plan of Care Reviewed With patient   Safety   Safety WDL WDL   Safety Factors ID band on;upper side rails raised x 2;call light in reach;wheels locked;bed in low position;patient up in chair   Enhanced Safety Measures bed alarm set   Fall Risk Assessment (every shift)   History Of Fall (W/I 3 Mos) 0-->No   Polypharmacy 3-->Yes   Central Nervous System/Psychotropic Medication 0-->No   Cardiovascular Medication 3-->Yes   Age Greater Than 65 Years 0-->No   Altered Elimination 2-->Yes   Cognitive Deficit 0-->No   Sensory Deficit 0-->No   Dizziness/Vertigo 0-->No   Depression 0-->No   Mobility Deficit/Weakness 0-->No   Male 1-->Yes   Fall Risk Score 9   ABC Risk for Fall with Injury Assessment   A= Age: Is the patient greater than or equal to 85 years old or frail due to clinical condition? No   B=Bones: Does the patient have osteoporosis, previous fracture, prolonged steroid use, or metastatic bone cancer? No   C=Coagulation Disorders: Does the patient have a bleeding disorder, either through anticoagulants or underlying clinical condition? Yes   S=recent Surgery: Is the patient post-op surgicalwith a recent lower limb amputation or recent major abdominal or thoracic surgery? No   Safety Management   Safety Promotion/Fall Prevention assistive device/personal item within reach;medications reviewed;family to remain at bedside;nonskid shoes/socks  when out of bed;instructed to call staff for mobility   Medication Review/Management medications reviewed   Patient Rounds bed in low position;bed wheels locked;call light in patient/parent reach;clutter free environment maintained;ID band on;placement of personal items at bedside;visualized patient;toileting offered   Daily Care   Activity Management Ambulated in room - L4   Activity Assistance Provided independent   Mobility   GEMS (Eustace Early Mobility Scale) Level 4-Independent activities   Positioning   Body Position position changed independently   Head of Bed (HOB) Positioning HOB elevated   Positioning/Transfer Devices pillows;in use   Hygiene Care   Hygiene Assistance independent

## 2024-03-13 NOTE — DISCHARGE SUMMARY
"[facility] Medicine  Discharge Summary      Patient Name: Haroldo Dickinson  MRN: [id number]  SADIE: [id number]  Patient Class: IP- Inpatient  Admission Date: 3/10/2024  Hospital Length of Stay: 2 days  Discharge Date and Time:  03/13/2024 12:30 PM  Attending Physician: Omar Marino MD   Discharging Provider: Omar Marino MD  Primary Care Provider: Mireya Larose MD  [facility] Medicine Team: [facility]  Omar Marino MD  Primary Care Team: [facility]    HPI:   Haroldo Dickinson is a 47yo M with a PMH of CAD (3-vessel disease seen on [facility] in November 2023), PAD, HFpEF (60% with G3DD), HTN, HLD, DM2, ESRD, and anemia of ESRD who presented to the [facility] ED on 3/10/2024 with complaints of chest pressure and palpitations. Pt reports that he was in his usual state of health until day PTA: he first noticed something was different when he became winded after walking from his car to the pharmacy, which was not a far distance, and he does not normally have SOB during these activities; SOB resolved, though he also noted that he was "hotter than usual" during this episode. At 11pm, while he was getting ready for bed, he developed sudden onset palpitations with associated chest pressure and pain in his left arm. Denies F/C/N/V/D/C, HA, overt CP, abdominal pain, dysuria, extremity swelling, or symptoms otherwise. He tried sitting up for a while to see if this improved his symptoms, but they persisted; he called EMS, who provided nitro sprays and ASA, which did cause symptomatic improvement. He was then brought to the ER.    In the ED, vitals significant for /88. Labs remarkable for , though were otherwise stable at baseline. Troponin negative. CXR without any acute cardiopulmonary processes. Hospital medicine was consulted for admission and further management.    * No surgery found *      Hospital Course:   [place] [facility] and noted to have marked troponin rise to 24 and 25. IC " consulted: not a PCI candidate, and per Dr. Fu, also not a CABG candidate. Discussed prognosis with pt, who is not interested in palliative care/hospice at this time. Optimizing medical management per IC. Pt completed 48h of heparin on 3/13/2024 and was deemed appropriate for discharge; seen and examined prior to departure. Plan discussed with pt, who was agreeable and amenable; medications were discussed and reviewed, outpatient follow-up scheduled, ER precautions were given, all questions were answered to the pt's satisfaction, and Mr. Dickinson was subsequently discharged.       Goals of Care Treatment Preferences:  Code Status: Full Code          What is most important right now is to focus on symptom/pain control, extending life as long as possible, even it it means sacrificing quality.  Accordingly, we have decided that the best plan to meet the patient's goals includes continuing with treatment.      Consults:   Consults (From admission, onward)          Status Ordering Provider     Inpatient consult to Interventional Cardiology  Once        Provider:  (Not yet assigned)    Completed EN MAHONEY     Inpatient consult to Nephrology  Once        Provider:  (Not yet assigned)    Completed EN MAHONEY            No new Assessment & Plan notes have been filed under this hospital service since the last note was generated.  Service: Hospital Medicine    Final Active Diagnoses:    Diagnosis Date Noted POA    PRINCIPAL PROBLEM:  Acute coronary syndrome with high troponin [I24.9] 03/11/2024 Yes    PAD (peripheral artery disease) [I73.9] 03/10/2024 Yes    Coronary artery disease involving native coronary artery of native heart with unstable angina pectoris [I25.110] 11/21/2019 Yes    Chronic diastolic heart failure [I50.32] 05/02/2023 Yes    Hypertension associated with stage 5 chronic kidney disease due to type 2 diabetes mellitus [E11.22, I12.0, N18.5]  Yes    Hyperlipidemia associated with type 2 diabetes  mellitus [E11.69, E78.5] 11/21/2019 Yes    Type 2 diabetes mellitus with chronic kidney disease on chronic dialysis, without long-term current use of insulin [E11.22, N18.6, Z99.2] 03/10/2024 Not Applicable    ESRD on hemodialysis [N18.6, Z99.2] 07/23/2019 Not Applicable    Anemia in end-stage renal disease [N18.6, D63.1] 06/14/2022 Yes      Problems Resolved During this Admission:       Discharged Condition: stable    Disposition: Home or Self Care    Follow Up:   Follow-up Information       Mireya Larose MD. Schedule an appointment as soon as possible for a visit.    Specialty: Internal Medicine  Contact information:  [address] [place] [address]  [phone]                           Patient Instructions:      Ambulatory referral/consult to Internal Medicine   Standing Status: Future   Referral Priority: Routine Referral Type: Consultation   Referral Reason: Specialty Services Required   Requested Specialty: Internal Medicine   Number of Visits Requested: 1     Ambulatory referral/consult to Cardiology   Standing Status: Future   Referral Priority: Routine Referral Type: Consultation   Referral Reason: Specialty Services Required   Requested Specialty: Cardiology   Number of Visits Requested: 1     Diet Cardiac     Diet renal     Notify your health care provider if you experience any of the following:  increased confusion or weakness     Notify your health care provider if you experience any of the following:  persistent dizziness, light-headedness, or visual disturbances     Notify your health care provider if you experience any of the following:  worsening rash     Notify your health care provider if you experience any of the following:  severe persistent headache     Notify your health care provider if you experience any of the following:  difficulty breathing or increased cough     Notify your health care provider if you experience any of the following:  severe uncontrolled pain     Notify your  health care provider if you experience any of the following:  persistent nausea and vomiting or diarrhea     Notify your health care provider if you experience any of the following:  temperature >100.4     Activity as tolerated       Significant Diagnostic Studies: Labs: All labs within the past 24 hours have been reviewed    Pending Diagnostic Studies:       None           Medications:  Reconciled Home Medications:      Medication List        START taking these medications      isosorbide mononitrate 60 MG 24 hr tablet  Commonly known as: IMDUR  Take 1 tablet (60 mg total) by mouth once daily.     nitroGLYCERIN 0.4 MG SL tablet  Commonly known as: NITROSTAT  Place 1 tablet (0.4 mg total) under the tongue every 5 (five) minutes as needed for Chest pain.            CHANGE how you take these medications      hydrALAZINE 50 MG tablet  Commonly known as: APRESOLINE  Take 1 tablet (50 mg total) by mouth once daily.  What changed: additional instructions     NIFEdipine 90 MG (OSM) 24 hr tablet  Commonly known as: PROCARDIA-XL  Take 1 tablet (90 mg total) by mouth once daily.  What changed:   medication strength  how much to take            CONTINUE taking these medications      aspirin 81 MG EC tablet  Commonly known as: ECOTRIN  Take 1 tablet (81 mg total) by mouth once daily.     atorvastatin 80 MG tablet  Commonly known as: LIPITOR  Take 1 tablet (80 mg total) by mouth once daily.     carvediloL 25 MG tablet  Commonly known as: COREG  Take 2 tablets (50 mg total) by mouth 2 (two) times daily with meals.     ciclopirox 8 % Soln  Commonly known as: PENLAC  Apply topically nightly.     cinacalcet 90 MG Tab  Commonly known as: SENSIPAR  Take 1 tablet by mouth once daily.     clopidogreL 75 mg tablet  Commonly known as: PLAVIX  Take 1 tablet (75 mg total) by mouth once daily.     ezetimibe 10 mg tablet  Commonly known as: ZETIA  Take 1 tablet (10 mg total) by mouth once daily.     polyethylene glycol 17 gram/dose  powder  Commonly known as: GLYCOLAX  Use cap to measure 17 g, then mix in liquid and drink by mouth once daily.     sevelamer carbonate 800 mg Tab  Commonly known as: RENVELA  Take 800 mg by mouth 3 (three) times daily with meals.            STOP taking these medications      sildenafiL 100 MG tablet  Commonly known as: VIAGRA              Indwelling Lines/Drains at time of discharge:   Lines/Drains/Airways       Drain  Duration                  Hemodialysis AV Fistula Right forearm -- days                    Time spent on the discharge of patient: 35 minutes       Omar Marino MD  Attending Physician  Medical Director - [facility]  Department of Hospital Medicine  3/13/2024

## 2024-03-13 NOTE — PROGRESS NOTES
HD TX completed.  Net fluid removed 2 liters. VSS.  Tolerated TX.  Report given to primary nurse.  To return to floor by wheelchair.

## 2024-03-13 NOTE — PLAN OF CARE
[facility]  Discharge Final Note    Primary Care Provider: Mireya Larose MD    Expected Discharge Date: 3/13/2024    Patient discharged to home via personal transportation.     Patient's bedside nurse and patient notified of the above.    Discharge Plan A and Plan B have been determined by review of patient's clinical status, future medical and therapeutic needs, and coverage/benefits for post-acute care in coordination with multidisciplinary team members.        Final Discharge Note (most recent)       Final Note - 03/13/24 1625          Final Note    Assessment Type Final Discharge Note (P)      Anticipated Discharge Disposition Home or Self Care (P)         Post-Acute Status    Post-Acute Authorization Other (P)      Other Status No Post-Acute Service Needs (P)                      Important Message from Medicare             Contact Info       [facility] - 3rd Fl   Specialty: Cardiology    [address]  [facility] [address]   Phone: [phone]       Next Steps: Follow up    Instructions: Message sent for nurse to call and schedule follow up appointment; however, if you do not hear from someone within 48 hours contact the number listed.            Future Appointments   Date Time Provider Department Center   3/19/2024 10:20 AM NURSE, [facility] [place]   3/22/2024  9:40 AM Mireya Larose MD [facility] [place]   4/11/2024  2:00 PM Apollo Dias MD [facility] [place]   6/18/2024  8:00 AM Mireya Larose MD [facility] [place]        scheduled post-discharge follow-up appointment and information added to AVS.     Genevieve Kidd LMSW  [facility]  Ext. [phone]

## 2024-03-13 NOTE — PROGRESS NOTES
Patient arrived by wheelchair.  Awake, alert and responsive.  VSS.   HD  maintenance TX  (MWF) started via RFA AV fistula using 15 g needles

## 2024-03-13 NOTE — TELEPHONE ENCOUNTER
----- Message from Renuka Tom sent at 3/13/2024  2:45 PM CDT -----  Regarding: Hospital f/u  Patient is currently in the hospital and will be discharge today, his  calling to schedule an appt. Please call patient back @ [phone]. Thank you Renuka

## 2024-03-13 NOTE — PLAN OF CARE
BP elevated this AM. Scheduled antihypertensives sent down to dialysis with patient. Patient denies chest pain, SOB, and has no complaints this morning.  Patient transported to dialysis VIA wheelchair. Morning assessment complete, see flowsheet.    Problem: Adult Inpatient Plan of Care  Goal: Plan of Care Review  Outcome: Ongoing, Progressing  Goal: Patient-Specific Goal (Individualized)  Outcome: Ongoing, Progressing  Goal: Absence of Hospital-Acquired Illness or Injury  Outcome: Ongoing, Progressing  Goal: Optimal Comfort and Wellbeing  Outcome: Ongoing, Progressing  Goal: Readiness for Transition of Care  Outcome: Ongoing, Progressing     Problem: Infection  Goal: Absence of Infection Signs and Symptoms  Outcome: Ongoing, Progressing     Problem: Diabetes Comorbidity  Goal: Blood Glucose Level Within Targeted Range  Outcome: Ongoing, Progressing     Problem: Device-Related Complication Risk (Hemodialysis)  Goal: Safe, Effective Therapy Delivery  Outcome: Ongoing, Progressing     Problem: Hemodynamic Instability (Hemodialysis)  Goal: Effective Tissue Perfusion  Outcome: Ongoing, Progressing     Problem: Fall Injury Risk  Goal: Absence of Fall and Fall-Related Injury  Outcome: Ongoing, Progressing

## 2024-03-18 PROBLEM — I21.4 NSTEMI (NON-ST ELEVATED MYOCARDIAL INFARCTION): Status: RESOLVED | Noted: 2023-11-27 | Resolved: 2024-03-18

## 2024-03-19 ENCOUNTER — CLINICAL SUPPORT (OUTPATIENT)
Dept: FAMILY MEDICINE | Facility: CLINIC | Age: 49
End: 2024-03-19
Payer: MEDICARE

## 2024-03-19 VITALS — SYSTOLIC BLOOD PRESSURE: 175 MMHG | HEART RATE: 68 BPM | DIASTOLIC BLOOD PRESSURE: 95 MMHG

## 2024-03-19 DIAGNOSIS — I10 ESSENTIAL HYPERTENSION: Primary | ICD-10-CM

## 2024-03-19 PROCEDURE — 99999 PR PBB SHADOW E&M-EST. PATIENT-LVL II: CPT | Mod: PBBFAC,,,

## 2024-03-19 NOTE — PROGRESS NOTES
Haroldo Dickinson 48 y.o. male is here today for Blood Pressure check.   History of HTN yes.    Review of patient's allergies indicates:   Allergen Reactions    No known allergies      Creatinine   Date Value Ref Range Status   03/13/2024 11.1 (H) 0.5 - 1.4 mg/dL Final     Sodium   Date Value Ref Range Status   03/13/2024 135 (L) 136 - 145 mmol/L Final     Potassium   Date Value Ref Range Status   03/13/2024 4.0 3.5 - 5.1 mmol/L Final   ]  Patient verifies taking blood pressure medications on a regular basis at the same time of the day.     Current Outpatient Medications:     aspirin (ECOTRIN) 81 MG EC tablet, Take 1 tablet (81 mg total) by mouth once daily., Disp: 30 tablet, Rfl: 11    atorvastatin (LIPITOR) 80 MG tablet, Take 1 tablet (80 mg total) by mouth once daily., Disp: 90 tablet, Rfl: 3    carvediloL (COREG) 25 MG tablet, Take 2 tablets (50 mg total) by mouth 2 (two) times daily with meals., Disp: 360 tablet, Rfl: 3    ciclopirox (PENLAC) 8 % Soln, Apply topically nightly., Disp: 6.6 mL, Rfl: 3    cinacalcet (SENSIPAR) 90 MG Tab, Take 1 tablet by mouth once daily., Disp: , Rfl:     clopidogreL (PLAVIX) 75 mg tablet, Take 1 tablet (75 mg total) by mouth once daily., Disp: 90 tablet, Rfl: 3    ezetimibe (ZETIA) 10 mg tablet, Take 1 tablet (10 mg total) by mouth once daily., Disp: 90 tablet, Rfl: 3    hydrALAZINE (APRESOLINE) 50 MG tablet, Take 1 tablet (50 mg total) by mouth once daily., Disp: 90 tablet, Rfl: 0    isosorbide mononitrate (IMDUR) 60 MG 24 hr tablet, Take 1 tablet (60 mg total) by mouth once daily., Disp: 90 tablet, Rfl: 0    NIFEdipine (PROCARDIA-XL) 90 MG (OSM) 24 hr tablet, Take 1 tablet (90 mg total) by mouth once daily., Disp: 90 tablet, Rfl: 0    nitroGLYCERIN (NITROSTAT) 0.4 MG SL tablet, Place 1 tablet (0.4 mg total) under the tongue every 5 (five) minutes as needed for Chest pain., Disp: 100 tablet, Rfl: 0    polyethylene glycol (GLYCOLAX) 17 gram/dose powder, Use cap to measure 17 g, then  mix in liquid and drink by mouth once daily., Disp: 238 g, Rfl: 0    sevelamer carbonate (RENVELA) 800 mg Tab, Take 800 mg by mouth 3 (three) times daily with meals., Disp: , Rfl:   No current facility-administered medications for this visit.    Facility-Administered Medications Ordered in Other Visits:     lidocaine (PF) 10 mg/ml (1%) injection 10 mg, 1 mL, Intradermal, Once, Michelle Spence MD  Does patient have record of home blood pressure readings no. Readings have been averaging not done.   Last dose of blood pressure medication was taken at this morning, except coreg, pt is out of medication (called pharmacy and it is ready for ).  Patient is asymptomatic.   Complains of no complaints.    Vitals:    03/19/24 1039 03/19/24 1100   BP: (!) 172/94 (!) 175/95   BP Location: Left arm Left arm   Patient Position: Sitting Sitting   BP Method: Medium (Automatic) Medium (Manual)   Pulse: 70 68         Dr. Larose informed of nurse visit.

## 2024-03-20 ENCOUNTER — TELEPHONE (OUTPATIENT)
Dept: FAMILY MEDICINE | Facility: CLINIC | Age: 49
End: 2024-03-20

## 2024-03-20 NOTE — TELEPHONE ENCOUNTER
----- Message from Mireya Larose MD sent at 3/20/2024  7:25 AM CDT -----  Regarding: Blood Pressure  Hello, please call patient and let him know that due to his high blood pressure. I recommend that he take hydralazine and coreg twice a day. He can continue taking nifedipine once a day. Please schedule a nursing visit in two weeks to check his blood pressure. Thank you!

## 2024-04-03 ENCOUNTER — TELEPHONE (OUTPATIENT)
Dept: FAMILY MEDICINE | Facility: CLINIC | Age: 49
End: 2024-04-03
Payer: MEDICARE

## 2024-04-04 ENCOUNTER — OFFICE VISIT (OUTPATIENT)
Dept: FAMILY MEDICINE | Facility: CLINIC | Age: 49
End: 2024-04-04
Payer: MEDICARE

## 2024-04-04 VITALS
SYSTOLIC BLOOD PRESSURE: 162 MMHG | WEIGHT: 163.13 LBS | DIASTOLIC BLOOD PRESSURE: 92 MMHG | OXYGEN SATURATION: 98 % | HEIGHT: 67 IN | TEMPERATURE: 98 F | RESPIRATION RATE: 18 BRPM | HEART RATE: 60 BPM | BODY MASS INDEX: 25.6 KG/M2

## 2024-04-04 DIAGNOSIS — R26.2 AMBULATORY DYSFUNCTION: ICD-10-CM

## 2024-04-04 DIAGNOSIS — C64.2 RENAL CELL CARCINOMA OF LEFT KIDNEY: ICD-10-CM

## 2024-04-04 DIAGNOSIS — R01.1 CARDIAC MURMUR: ICD-10-CM

## 2024-04-04 DIAGNOSIS — I24.9 ACUTE CORONARY SYNDROME WITH HIGH TROPONIN: ICD-10-CM

## 2024-04-04 DIAGNOSIS — I69.351 HEMIPARESIS AFFECTING RIGHT SIDE AS LATE EFFECT OF CEREBROVASCULAR ACCIDENT: ICD-10-CM

## 2024-04-04 DIAGNOSIS — I10 ESSENTIAL HYPERTENSION: Primary | ICD-10-CM

## 2024-04-04 DIAGNOSIS — N25.81 HYPERPARATHYROIDISM, SECONDARY RENAL: ICD-10-CM

## 2024-04-04 PROBLEM — Z86.16 HISTORY OF COVID-19: Status: RESOLVED | Noted: 2022-12-29 | Resolved: 2024-04-04

## 2024-04-04 PROBLEM — Z86.010 HISTORY OF COLON POLYPS: Status: RESOLVED | Noted: 2021-02-10 | Resolved: 2024-04-04

## 2024-04-04 PROBLEM — Z99.2 TYPE 2 DIABETES MELLITUS WITH CHRONIC KIDNEY DISEASE ON CHRONIC DIALYSIS, WITHOUT LONG-TERM CURRENT USE OF INSULIN: Status: RESOLVED | Noted: 2024-03-10 | Resolved: 2024-04-04

## 2024-04-04 PROBLEM — E11.22 TYPE 2 DIABETES MELLITUS WITH CHRONIC KIDNEY DISEASE ON CHRONIC DIALYSIS, WITHOUT LONG-TERM CURRENT USE OF INSULIN: Status: RESOLVED | Noted: 2024-03-10 | Resolved: 2024-04-04

## 2024-04-04 PROBLEM — E11.69 HYPERLIPIDEMIA ASSOCIATED WITH TYPE 2 DIABETES MELLITUS: Status: RESOLVED | Noted: 2019-11-21 | Resolved: 2024-04-04

## 2024-04-04 PROBLEM — Z86.0100 HISTORY OF COLON POLYPS: Status: RESOLVED | Noted: 2021-02-10 | Resolved: 2024-04-04

## 2024-04-04 PROBLEM — Z98.61 POST PTCA: Status: RESOLVED | Noted: 2020-08-26 | Resolved: 2024-04-04

## 2024-04-04 PROBLEM — E78.5 HYPERLIPIDEMIA ASSOCIATED WITH TYPE 2 DIABETES MELLITUS: Status: RESOLVED | Noted: 2019-11-21 | Resolved: 2024-04-04

## 2024-04-04 PROBLEM — N18.6 TYPE 2 DIABETES MELLITUS WITH CHRONIC KIDNEY DISEASE ON CHRONIC DIALYSIS, WITHOUT LONG-TERM CURRENT USE OF INSULIN: Status: RESOLVED | Noted: 2024-03-10 | Resolved: 2024-04-04

## 2024-04-04 PROBLEM — R00.2 PALPITATIONS: Status: RESOLVED | Noted: 2023-05-01 | Resolved: 2024-04-04

## 2024-04-04 PROCEDURE — 3077F SYST BP >= 140 MM HG: CPT | Mod: CPTII,S$GLB,, | Performed by: INTERNAL MEDICINE

## 2024-04-04 PROCEDURE — 99214 OFFICE O/P EST MOD 30 MIN: CPT | Mod: S$GLB,,, | Performed by: INTERNAL MEDICINE

## 2024-04-04 PROCEDURE — 3044F HG A1C LEVEL LT 7.0%: CPT | Mod: CPTII,S$GLB,, | Performed by: INTERNAL MEDICINE

## 2024-04-04 PROCEDURE — 3008F BODY MASS INDEX DOCD: CPT | Mod: CPTII,S$GLB,, | Performed by: INTERNAL MEDICINE

## 2024-04-04 PROCEDURE — 1159F MED LIST DOCD IN RCRD: CPT | Mod: CPTII,S$GLB,, | Performed by: INTERNAL MEDICINE

## 2024-04-04 PROCEDURE — 1160F RVW MEDS BY RX/DR IN RCRD: CPT | Mod: CPTII,S$GLB,, | Performed by: INTERNAL MEDICINE

## 2024-04-04 PROCEDURE — 3066F NEPHROPATHY DOC TX: CPT | Mod: CPTII,S$GLB,, | Performed by: INTERNAL MEDICINE

## 2024-04-04 PROCEDURE — 1111F DSCHRG MED/CURRENT MED MERGE: CPT | Mod: CPTII,S$GLB,, | Performed by: INTERNAL MEDICINE

## 2024-04-04 PROCEDURE — 3078F DIAST BP <80 MM HG: CPT | Mod: CPTII,S$GLB,, | Performed by: INTERNAL MEDICINE

## 2024-04-04 PROCEDURE — 99999 PR PBB SHADOW E&M-EST. PATIENT-LVL IV: CPT | Mod: PBBFAC,,, | Performed by: INTERNAL MEDICINE

## 2024-04-04 RX ORDER — POLYETHYLENE GLYCOL 3350 17 G/17G
17 POWDER, FOR SOLUTION ORAL DAILY PRN
Qty: 238 G | Refills: 0
Start: 2024-04-04

## 2024-04-04 RX ORDER — SUCROFERRIC OXYHYDROXIDE 500 MG/1
2 TABLET, CHEWABLE ORAL
COMMUNITY
Start: 2023-12-22

## 2024-04-04 RX ORDER — HYDRALAZINE HYDROCHLORIDE 50 MG/1
50 TABLET, FILM COATED ORAL EVERY 12 HOURS
Qty: 60 TABLET | Refills: 11 | Status: ON HOLD | OUTPATIENT
Start: 2024-04-04 | End: 2024-06-12

## 2024-04-04 RX ORDER — VIT B COMPLX C/FOLIC ACID/ZINC 0.8-12.5MG
1 TABLET ORAL
COMMUNITY
Start: 2024-01-18

## 2024-04-04 RX ORDER — CINACALCET 30 MG/1
TABLET, FILM COATED ORAL
COMMUNITY
Start: 2024-03-25

## 2024-04-04 RX ORDER — HYDRALAZINE HYDROCHLORIDE 25 MG/1
TABLET, FILM COATED ORAL
COMMUNITY
Start: 2024-01-04 | End: 2024-04-04 | Stop reason: ALTCHOICE

## 2024-04-04 NOTE — PROGRESS NOTES
Health Maintenance Due   Topic     TETANUS VACCINE  Pt will have to receive at pharmacy    Pneumococcal Vaccines (Age 0-64) (2 of 2 - PCV)     COVID-19 Vaccine (3 - Moderna risk series) Not offered at this office    Eye Exam  Consult pcp    Influenza Vaccine (1)

## 2024-04-04 NOTE — PROGRESS NOTES
Chief Complaint: Hospital Follow Up and Arm Pain (L arm only when sleeping)      Haroldo Dickinson  is a 48 y.o. year old patient who presents today for hosp follow up     Left arm pain, only when he lays down to sleep. For about a week. Goes away when sleeping.  Reports no chest pain since ED visit.   Has been compliant to meds, no changes in diet.   Reports weakness of his legs since nephrectomy. Needs walker. Also has residual right sided weakness from stroke.     Past Medical History:   Diagnosis Date    CAD (coronary artery disease), native coronary artery 11/21/2019    Cataract     Diabetes mellitus     Diabetic retinopathy     Dialysis patient     DM type 2 causing renal disease, not at goal     ESRD (end stage renal disease) started dialysis 01/2014 06/05/2014    History of colon polyps 02/10/2021    History of COVID-19 12/29/2022    Hyperparathyroidism, secondary renal 06/05/2014    Hypertension     NSTEMI (non-ST elevated myocardial infarction) 12/21/2013    Organ transplant candidate 06/05/2014    Pneumonia     Post PTCA 08/26/2020    RCA [facility] 7-25-20    Renal cell carcinoma of right kidney     Renal hypertension     Stroke        Past Surgical History:   Procedure Laterality Date    ANGIOGRAM, CORONARY, WITH LEFT HEART CATHETERIZATION N/A 7/1/2021    Procedure: Angiogram, Coronary, with Left Heart Cath;  Surgeon: Miki Zendejas MD;  Location: [facility] CATH LAB;  Service: Cardiology;  Laterality: N/A;    ANGIOGRAM, CORONARY, WITH LEFT HEART CATHETERIZATION N/A 11/28/2023    Procedure: Angiogram, Coronary, with Left Heart Cath;  Surgeon: Noah Pendleton MD;  Location: [facility] CATH LAB;  Service: Cardiology;  Laterality: N/A;    COLONOSCOPY N/A 5/3/2017    Procedure: COLONOSCOPY;  Surgeon: Rufino Carpenter MD;  Location: [facility] ENDO ([address] [place] [address]);  Service: Endoscopy;  Laterality: N/A;   states can only schedule on Wednesdays    COLONOSCOPY N/A 2/9/2021    Procedure: COLONOSCOPY;  Surgeon: Edil Wong MD;  Location:  [facility] ENDO (4TH FLR);  Service: Endoscopy;  Laterality: N/A;  Dialysis MWF/ labwork day of procedure  right arm aceess  per Dr. Colin pt can hold Plavix 5 days prior see note- sm  COVID test on 2/6/21 at [facility] -     COLONOSCOPY N/A 2/10/2021    Procedure: COLONOSCOPY;  Surgeon: Robert Ayers MD;  Location: [facility] ENDO (4TH FLR);  Service: Endoscopy;  Laterality: N/A;  rescheduled due to poor bowel prep-BB  negative covid screening 2/6/21-BB  dialysis M-W-[id number]  okay to r/s for 2/9/21 and to hold Plavix per Dr. KIRAN Wong-BB  labs same day-BB    CORONARY STENT PLACEMENT N/A 7/25/2019    Procedure: INSERTION, STENT, CORONARY ARTERY;  Surgeon: Miki Zendejas MD;  Location: [facility] CATH LAB;  Service: Cardiology;  Laterality: N/A;    DIALYSIS FISTULA CREATION      FISTULOGRAM N/A 2/18/2019    Procedure: Fistulogram;  Surgeon: Yaneth De La Cruz MD;  Location: [facility] CATH LAB;  Service: Cardiology;  Laterality: N/A;    FISTULOGRAM Right 7/23/2019    Procedure: Fistulogram;  Surgeon: Oz Cordoba MD;  Location: [facility] CATH LAB;  Service: Cardiology;  Laterality: Right;    LAPAROSCOPIC ROBOT-ASSISTED SURGICAL REMOVAL OF KIDNEY USING DA JAIME XI Right 5/19/2022    Procedure: XI ROBOTIC NEPHRECTOMY;  Surgeon: Aidan Hendricks MD;  Location: [address];  Service: Urology;  Laterality: Right;  3hrs    LAPAROSCOPIC ROBOT-ASSISTED SURGICAL REMOVAL OF KIDNEY USING DA JAIME XI Left 1/5/2023    Procedure: XI ROBOTIC NEPHRECTOMY;  Surgeon: Aidan Hendricks MD;  [address];  Service: Urology;  Laterality: Left;  4 hrs    LEFT HEART CATHETERIZATION Left 7/25/2019    Procedure: Left heart cath;  Surgeon: Miki Zendejas MD;  Location: [facility] CATH LAB;  Service: Cardiology;  Laterality: Left;    REPAIR  1/5/2023    Procedure: REPAIR; COLOTOMY;  Surgeon: Maikel Lamb MD;  Location: [address];  Service: General;;    RETINAL LASER PROCEDURE Bilateral 2018 or 2017    Dr. Rothman    VASCULAR SURGERY           Family History   Problem Relation Age of Onset    Hypertension Mother     Diabetes Mother     Cataracts Mother     Hypertension Father     Hypertension Sister     Kidney disease Brother     Hypertension Brother     Heart disease Brother     Cancer Maternal Grandfather         Colon CA    Colon cancer Neg Hx     Esophageal cancer Neg Hx     Stomach cancer Neg Hx     Rectal cancer Neg Hx     Ulcerative colitis Neg Hx     Irritable bowel syndrome Neg Hx     Crohn's disease Neg Hx     Celiac disease Neg Hx     Glaucoma Neg Hx     Macular degeneration Neg Hx         Social History     Socioeconomic History    Marital status: Single   Occupational History     Employer: [employer]   Tobacco Use    Smoking status: Never    Smokeless tobacco: Never   Substance and Sexual Activity    Alcohol use: Not Currently     Comment: One drink a month    Drug use: No    Sexual activity: Yes     Partners: Female     Birth control/protection: None   Social History Narrative    Disabled    Was     One son          Social Determinants of Health     Financial Resource Strain: Low Risk  (3/11/2024)    Overall Financial Resource Strain (CARDIA)     Difficulty of Paying Living Expenses: Not hard at all   Food Insecurity: Patient Declined (3/11/2024)    Hunger Vital Sign     Worried About Running Out of Food in the Last Year: Patient declined     Ran Out of Food in the Last Year: Patient declined   Transportation Needs: No Transportation Needs (3/11/2024)    PRAPARE - Transportation     Lack of Transportation (Medical): No     Lack of Transportation (Non-Medical): No   Physical Activity: Inactive (3/11/2024)    Exercise Vital Sign     Days of Exercise per Week: 0 days     Minutes of Exercise per Session: 0 min   Stress: Patient Declined (3/11/2024)    [employer] - Occupational Stress Questionnaire     Feeling of Stress : Patient declined   Social Connections: Socially Isolated (3/11/2024)    Social  Connection and Isolation Panel [NHANES]     Frequency of Communication with Friends and Family: More than three times a week     Frequency of Social Gatherings with Friends and Family: More than three times a week     Attends [employer] Services: Never     Active Member of Clubs or Organizations: No     Attends Club or Organization Meetings: Never     Marital Status: Patient unable to answer   Housing Stability: Low Risk  (3/11/2024)    Housing Stability Vital Sign     Unable to Pay for Housing in the Last Year: No     Number of Places Lived in the Last Year: 1     Unstable Housing in the Last Year: No         Current Outpatient Medications:     aspirin (ECOTRIN) 81 MG EC tablet, Take 1 tablet (81 mg total) by mouth once daily., Disp: 30 tablet, Rfl: 11    atorvastatin (LIPITOR) 80 MG tablet, Take 1 tablet (80 mg total) by mouth once daily., Disp: 90 tablet, Rfl: 3    carvediloL (COREG) 25 MG tablet, Take 2 tablets (50 mg total) by mouth 2 (two) times daily with meals., Disp: 360 tablet, Rfl: 3    cinacalcet (SENSIPAR) 30 MG Tab, Take by mouth., Disp: , Rfl:     clopidogreL (PLAVIX) 75 mg tablet, Take 1 tablet (75 mg total) by mouth once daily., Disp: 90 tablet, Rfl: 3    ezetimibe (ZETIA) 10 mg tablet, Take 1 tablet (10 mg total) by mouth once daily., Disp: 90 tablet, Rfl: 3    isosorbide mononitrate (IMDUR) 60 MG 24 hr tablet, Take 1 tablet (60 mg total) by mouth once daily., Disp: 90 tablet, Rfl: 0    NIFEdipine (PROCARDIA-XL) 90 MG (OSM) 24 hr tablet, Take 1 tablet (90 mg total) by mouth once daily., Disp: 90 tablet, Rfl: 0    nitroGLYCERIN (NITROSTAT) 0.4 MG SL tablet, Place 1 tablet (0.4 mg total) under the tongue every 5 (five) minutes as needed for Chest pain., Disp: 100 tablet, Rfl: 0    sevelamer carbonate (RENVELA) 800 mg Tab, Take 800 mg by mouth 3 (three) times daily with meals., Disp: , Rfl:     ciclopirox (PENLAC) 8 % Soln, Apply topically nightly., Disp: 6.6 mL, Rfl: 3    hydrALAZINE (APRESOLINE) 50  MG tablet, Take 1 tablet (50 mg total) by mouth every 12 (twelve) hours., Disp: 60 tablet, Rfl: 11    polyethylene glycol (GLYCOLAX) 17 gram/dose powder, Take 17 g by mouth daily as needed for Constipation., Disp: 238 g, Rfl: 0    VELPHORO 500 mg Chew, Take 2 tablets by mouth., Disp: , Rfl:     vit B complex-C-folic ac-zinc (RENAPLEX) 800 mcg- 12.5 mg Tab, Take 1 tablet by mouth., Disp: , Rfl:   No current facility-administered medications for this visit.    Facility-Administered Medications Ordered in Other Visits:     lidocaine (PF) 10 mg/ml (1%) injection 10 mg, 1 mL, Intradermal, Once, Michelle Spence MD     Review of Systems   Cardiovascular:  Negative for chest pain.   Musculoskeletal:  Positive for myalgias.   Neurological:  Positive for weakness.        Objective:      Vitals:    04/04/24 0927   BP: (!) 162/92   Pulse:    Resp:    Temp:        Physical Exam  Constitutional:       Appearance: Normal appearance.   HENT:      Head: Normocephalic and atraumatic.   Cardiovascular:      Rate and Rhythm: Normal rate.   Musculoskeletal:         General: No tenderness. Normal range of motion.      Left shoulder: Normal. No tenderness or bony tenderness. Normal range of motion.      Left upper arm: Normal. No swelling, tenderness or bony tenderness.      Left elbow: Normal. Normal range of motion. No tenderness.   Skin:     General: Skin is warm and dry.   Neurological:      General: No focal deficit present.      Mental Status: He is alert and oriented to person, place, and time.          Assessment:       1. Essential hypertension    2. Acute coronary syndrome with high troponin    3. Renal cell carcinoma of left kidney    4. Hemiparesis affecting right side as late effect of cerebrovascular accident    5. Hyperparathyroidism, secondary renal    6. Ambulatory dysfunction    7. Cardiac murmur          Plan:   1. Essential hypertension  Assessment & Plan:  Chronic, uncontrolled. BP: (!) 162/92 (4/4/2024  9:27  AM)    - counseled patient on lifestyle changes to reduce blood pressure including reducing salt intake, weight loss, eating plenty of fruits and vegetables, cutting down on alcohol and exercise  - cont nifedipine and coreg   - increase hydralazine 50mg BID  - two week BP check  - advised to measure BP at least three times a week   - two month follow up       Orders:  -     hydrALAZINE (APRESOLINE) 50 MG tablet; Take 1 tablet (50 mg total) by mouth every 12 (twelve) hours.  Dispense: 60 tablet; Refill: 11    2. Acute coronary syndrome with high troponin  Assessment & Plan:  Recent hosp adm for NSTEMI  Reports no CP     - has appt with cardiology 4/11  - continue DAPT, coreg, imdur and lipitor   - has nitrogly PRN    Orders:  -     Ambulatory referral/consult to Internal Medicine    3. Renal cell carcinoma of left kidney  Assessment & Plan:  S/p nephrectomy   Now ESRD on HD  Still waiting for transplant       4. Hemiparesis affecting right side as late effect of cerebrovascular accident  Assessment & Plan:  Chronic, still reports mild weakness which has led to ambulatory dysfunction    Orders:  -     Ambulatory referral/consult to Physical/Occupational Therapy; Future; Expected date: 04/11/2024    5. Hyperparathyroidism, secondary renal  Assessment & Plan:  Recent levels (2022) was 685  Continue cinacelcet       6. Ambulatory dysfunction  Assessment & Plan:  Reports weakness of his legs since nephrectomy. Needs walker. Also has residual right sided weakness from stroke.   Requested PT referral   Also recommended patient increase protein intake, whether through protein powder or ensures.  Provided samples    Orders:  -     Ambulatory referral/consult to Physical/Occupational Therapy; Future; Expected date: 04/11/2024    7. Cardiac murmur  Assessment & Plan:  Systolic murmur (grade 4/6)  Discussed ECHO findings with patient   Has mild AS, MS and MR      Other orders  -     polyethylene glycol (GLYCOLAX) 17 gram/dose  powder; Take 17 g by mouth daily as needed for Constipation.  Dispense: 238 g; Refill: 0         Follow up in about 2 months (around 6/4/2024) for f/up HTN.

## 2024-04-04 NOTE — ASSESSMENT & PLAN NOTE
Chronic, uncontrolled. BP: (!) 162/92 (4/4/2024  9:27 AM)    - counseled patient on lifestyle changes to reduce blood pressure including reducing salt intake, weight loss, eating plenty of fruits and vegetables, cutting down on alcohol and exercise  - cont nifedipine and coreg   - increase hydralazine 50mg BID  - two week BP check  - advised to measure BP at least three times a week   - two month follow up

## 2024-04-04 NOTE — ASSESSMENT & PLAN NOTE
Reports weakness of his legs since nephrectomy. Needs walker. Also has residual right sided weakness from stroke.   Requested PT referral   Also recommended patient increase protein intake, whether through protein powder or ensures.  Provided samples

## 2024-04-04 NOTE — PROGRESS NOTES
Health Maintenance Due   Topic     TETANUS VACCINE  Hx of chickenpox. Notified pt can get vaccine at pharmacy.    Pneumococcal Vaccines (Age 0-64) (2 of 2 - PCV) Pt received    COVID-19 Vaccine (3 - Moderna risk series) Not offered at this Facility    Eye Exam  Consult pcp

## 2024-04-04 NOTE — ASSESSMENT & PLAN NOTE
Recent hosp adm for NSTEMI  Reports no CP     - has appt with cardiology 4/11  - continue DAPT, coreg, imdur and lipitor   - has nitrogly PRN

## 2024-04-16 ENCOUNTER — TELEPHONE (OUTPATIENT)
Dept: CARDIOLOGY | Facility: CLINIC | Age: 49
End: 2024-04-16
Payer: MEDICARE

## 2024-04-16 ENCOUNTER — NURSE TRIAGE (OUTPATIENT)
Dept: ADMINISTRATIVE | Facility: CLINIC | Age: 49
End: 2024-04-16
Payer: MEDICARE

## 2024-04-16 NOTE — TELEPHONE ENCOUNTER
----- Message from Vipin Avitia sent at 4/16/2024  9:38 AM CDT -----  Name of Who is Calling:TOMMIE VARGAS [[id number]]        What is the request in detail:Pt would like a callback to [facility] 4/11 appt.Please advise thank you       Can the clinic reply by [facility]:NO        What Number to Call Back if not in [facility]:.Telephone Information:  Mobile          [phone]

## 2024-04-16 NOTE — TELEPHONE ENCOUNTER
Spoke with pt and his [facility] nurse. Nurse reported she was only trying to reach pt's pcp to report bp's during visit this morning. Stated when she first got to pt's house it was 160/89, so she had him take his BP meds and rechecked it a few min ago and it is now 143/87. She reports pt is not having any symptoms and feels fine. Explained to nurse and pt that our triage nurse line is to triage pt and she declined triage. Requesting a message be sent to pcp office to report this morning's bp. Routing message to office staff. No further assistance needed.  Reason for Disposition   Nursing judgment    Protocols used: No Guideline or Reference [id number]

## 2024-04-17 ENCOUNTER — TELEPHONE (OUTPATIENT)
Dept: TRANSPLANT | Facility: CLINIC | Age: 49
End: 2024-04-17
Payer: MEDICARE

## 2024-04-18 ENCOUNTER — CLINICAL SUPPORT (OUTPATIENT)
Dept: FAMILY MEDICINE | Facility: CLINIC | Age: 49
End: 2024-04-18
Payer: MEDICARE

## 2024-04-18 VITALS — SYSTOLIC BLOOD PRESSURE: 132 MMHG | DIASTOLIC BLOOD PRESSURE: 78 MMHG

## 2024-04-18 DIAGNOSIS — I10 ESSENTIAL HYPERTENSION: Primary | ICD-10-CM

## 2024-04-18 NOTE — PROGRESS NOTES
Haroldo Dickinson 48 y.o. male is here today for Blood Pressure check.   History of HTN yes.    Review of patient's allergies indicates:   Allergen Reactions    No known allergies      Creatinine   Date Value Ref Range Status   03/13/2024 11.1 (H) 0.5 - 1.4 mg/dL Final     Sodium   Date Value Ref Range Status   03/13/2024 135 (L) 136 - 145 mmol/L Final     Potassium   Date Value Ref Range Status   03/13/2024 4.0 3.5 - 5.1 mmol/L Final   ]  Patient verifies taking blood pressure medications on a regular basis at the same time of the day.     Current Outpatient Medications:     aspirin (ECOTRIN) 81 MG EC tablet, Take 1 tablet (81 mg total) by mouth once daily., Disp: 30 tablet, Rfl: 11    atorvastatin (LIPITOR) 80 MG tablet, Take 1 tablet (80 mg total) by mouth once daily., Disp: 90 tablet, Rfl: 3    carvediloL (COREG) 25 MG tablet, Take 2 tablets (50 mg total) by mouth 2 (two) times daily with meals., Disp: 360 tablet, Rfl: 3    ciclopirox (PENLAC) 8 % Soln, Apply topically nightly., Disp: 6.6 mL, Rfl: 3    cinacalcet (SENSIPAR) 30 MG Tab, Take by mouth., Disp: , Rfl:     clopidogreL (PLAVIX) 75 mg tablet, Take 1 tablet (75 mg total) by mouth once daily., Disp: 90 tablet, Rfl: 3    ezetimibe (ZETIA) 10 mg tablet, Take 1 tablet (10 mg total) by mouth once daily., Disp: 90 tablet, Rfl: 3    hydrALAZINE (APRESOLINE) 50 MG tablet, Take 1 tablet (50 mg total) by mouth every 12 (twelve) hours., Disp: 60 tablet, Rfl: 11    isosorbide mononitrate (IMDUR) 60 MG 24 hr tablet, Take 1 tablet (60 mg total) by mouth once daily., Disp: 90 tablet, Rfl: 0    NIFEdipine (PROCARDIA-XL) 90 MG (OSM) 24 hr tablet, Take 1 tablet (90 mg total) by mouth once daily., Disp: 90 tablet, Rfl: 0    nitroGLYCERIN (NITROSTAT) 0.4 MG SL tablet, Place 1 tablet (0.4 mg total) under the tongue every 5 (five) minutes as needed for Chest pain., Disp: 100 tablet, Rfl: 0    polyethylene glycol (GLYCOLAX) 17 gram/dose powder, Take 17 g by mouth daily as needed  [FreeTextEntry1] : No lesions seen.\par Good exam obtained.\par RTC 6 months\par \par Other recent medical problems noted.  she will f/u with er MD's for those.\par Call for problems in our area.  for Constipation., Disp: 238 g, Rfl: 0    sevelamer carbonate (RENVELA) 800 mg Tab, Take 800 mg by mouth 3 (three) times daily with meals., Disp: , Rfl:     VELPHORO 500 mg Chew, Take 2 tablets by mouth., Disp: , Rfl:     vit B complex-C-folic ac-zinc (RENAPLEX) 800 mcg- 12.5 mg Tab, Take 1 tablet by mouth., Disp: , Rfl:   No current facility-administered medications for this visit.    Facility-Administered Medications Ordered in Other Visits:     lidocaine (PF) 10 mg/ml (1%) injection 10 mg, 1 mL, Intradermal, Once, Michelle Spence MD  Does patient have record of home blood pressure readings no. Readings have been averaging not done, states dialysis nurses did inform him readings have been a lot better.   Last dose of blood pressure medication was taken at this morning.  Patient is asymptomatic.   Complains of no complaints.    Vitals:    04/18/24 1325 04/18/24 1338   BP: (!) 150/82 132/78   BP Location: Left arm Left arm   Patient Position: Sitting Sitting   BP Method: Large (Manual) Large (Manual)         Dr. Larose informed of nurse visit.

## 2024-04-25 ENCOUNTER — TELEPHONE (OUTPATIENT)
Dept: TRANSPLANT | Facility: CLINIC | Age: 49
End: 2024-04-25
Payer: MEDICARE

## 2024-04-25 NOTE — TELEPHONE ENCOUNTER
Compliance form sent to Dialysis Unit. MA also specified for verification of adequate caregiver support and transportation to proceed with referral process.     Natalie Bae, Abdominal MA

## 2024-05-02 ENCOUNTER — OFFICE VISIT (OUTPATIENT)
Dept: PODIATRY | Facility: CLINIC | Age: 49
End: 2024-05-02
Payer: MEDICARE

## 2024-05-02 VITALS
SYSTOLIC BLOOD PRESSURE: 162 MMHG | HEIGHT: 67 IN | WEIGHT: 163.13 LBS | DIASTOLIC BLOOD PRESSURE: 75 MMHG | BODY MASS INDEX: 25.6 KG/M2

## 2024-05-02 DIAGNOSIS — B35.1 ONYCHOMYCOSIS DUE TO DERMATOPHYTE: ICD-10-CM

## 2024-05-02 DIAGNOSIS — N18.6 CONTROLLED TYPE 2 DIABETES MELLITUS WITH CHRONIC KIDNEY DISEASE ON CHRONIC DIALYSIS, WITHOUT LONG-TERM CURRENT USE OF INSULIN: Primary | ICD-10-CM

## 2024-05-02 DIAGNOSIS — E11.22 CONTROLLED TYPE 2 DIABETES MELLITUS WITH CHRONIC KIDNEY DISEASE ON CHRONIC DIALYSIS, WITHOUT LONG-TERM CURRENT USE OF INSULIN: Primary | ICD-10-CM

## 2024-05-02 DIAGNOSIS — Z99.2 CONTROLLED TYPE 2 DIABETES MELLITUS WITH CHRONIC KIDNEY DISEASE ON CHRONIC DIALYSIS, WITHOUT LONG-TERM CURRENT USE OF INSULIN: Primary | ICD-10-CM

## 2024-05-02 PROCEDURE — 11721 DEBRIDE NAIL 6 OR MORE: CPT | Mod: Q9,S$GLB,, | Performed by: PODIATRIST

## 2024-05-02 PROCEDURE — 99499 UNLISTED E&M SERVICE: CPT | Mod: S$GLB,,, | Performed by: PODIATRIST

## 2024-05-02 PROCEDURE — 99999 PR PBB SHADOW E&M-EST. PATIENT-LVL III: CPT | Mod: PBBFAC,,, | Performed by: PODIATRIST

## 2024-05-02 RX ORDER — CICLOPIROX 80 MG/ML
SOLUTION TOPICAL NIGHTLY
Qty: 6.6 ML | Refills: 3 | Status: SHIPPED | OUTPATIENT
Start: 2024-05-02 | End: 2024-06-18

## 2024-05-02 NOTE — PROGRESS NOTES
Subjective:     Patient ID: Haroldo Dickinson is a 48 y.o. male.    Chief Complaint: Diabetes Mellitus (4/4/24 Dr Larose) and Nail Care    Haroldo is a 48 y.o. male who presents to the clinic upon referral from Dr. Elizabeth joy. provider found  for evaluation and treatment of diabetic feet. Haroldo has a past medical history of CAD (coronary artery disease), native coronary artery (11/21/2019), Cataract, Diabetes mellitus, Diabetic retinopathy, Dialysis patient, DM type 2 causing renal disease, not at goal, ESRD (end stage renal disease) started dialysis 01/2014 (06/05/2014), History of colon polyps (02/10/2021), History of COVID-19 (12/29/2022), Hyperparathyroidism, secondary renal (06/05/2014), Hypertension, NSTEMI (non-ST elevated myocardial infarction) (12/21/2013), Organ transplant candidate (06/05/2014), Pneumonia, Post PTCA (08/26/2020), Renal cell carcinoma of right kidney, Renal hypertension, and Stroke. Presents for diabetic foot risk assessment.     PCP: Mireya Larose MD    Date Last Seen by PCP: per above    Current shoe gear: Tennis shoes    Hemoglobin A1C   Date Value Ref Range Status   04/11/2024 4.5 (L) 4.8 - 5.9 % Final   03/11/2024 4.7 4.0 - 5.6 % Final     Comment:     ADA Screening Guidelines:  5.7-6.4%  Consistent with prediabetes  >or=6.5%  Consistent with diabetes    High levels of fetal hemoglobin interfere with the HbA1C  assay. Heterozygous hemoglobin variants (HbS, HgC, etc)do  not significantly interfere with this assay.   However, presence of multiple variants may affect accuracy.     11/27/2023 5.1 4.0 - 5.6 % Final     Comment:     ADA Screening Guidelines:  5.7-6.4%  Consistent with prediabetes  >or=6.5%  Consistent with diabetes    High levels of fetal hemoglobin interfere with the HbA1C  assay. Heterozygous hemoglobin variants (HbS, HgC, etc)do  not significantly interfere with this assay.   However, presence of multiple variants may affect accuracy.     05/02/2023 4.6 4.0 - 5.6 % Final     Comment:      ADA Screening Guidelines:  5.7-6.4%  Consistent with prediabetes  >or=6.5%  Consistent with diabetes    High levels of fetal hemoglobin interfere with the HbA1C  assay. Heterozygous hemoglobin variants (HbS, HgC, etc)do  not significantly interfere with this assay.   However, presence of multiple variants may affect accuracy.           Review of Systems   Constitutional: Negative for chills.   Cardiovascular:  Negative for chest pain and claudication.   Respiratory:  Negative for cough.    Skin:  Positive for color change, dry skin and nail changes.   Musculoskeletal:  Positive for joint pain.   Gastrointestinal:  Negative for nausea.   Neurological:  Positive for paresthesias. Negative for numbness.   Psychiatric/Behavioral:  The patient is not nervous/anxious.         Objective:     Physical Exam  Constitutional:       Appearance: He is well-developed.      Comments: Oriented to time, place, and person.   Cardiovascular:      Comments: DP and PT pulses are palpable bilaterally. 3 sec capillary refill time and toes and feet are warm to touch proximally .  There is  hair growth on the feet and toes b/l. There is no edema b/l. No spider veins or varicosities present b/l.     Musculoskeletal:      Comments: Equinus noted b/l ankles with < 10 deg DF noted. MMT 5/5 in DF/PF/Inv/Ev resistance with no reproduction of pain in any direction. Passive range of motion of ankle and pedal joints is painless b/l.     Feet:      Right foot:      Skin integrity: No callus or dry skin.      Left foot:      Skin integrity: No callus or dry skin.   Lymphadenopathy:      Comments: Negative lymphadenopathy bilateral popliteal fossa and tarsal tunnel.   Skin:     Comments: No open lesions, lacerations or wounds noted.Interdigital spaces clean, dry and intact b/l. No erythema noted to b/l foot.    Toenails 1-5 bilaterally are elongated by 2-3 mm, thickened by 2-3 mm, discolored/yellowed, dystrophic, brittle with subungual debris.      Neurological:      Mental Status: He is alert.      Comments: Light touch, proprioception, and sharp/dull sensation are all intact bilaterally. Protective threshold with the Madeline-Wienstein monofilament is intact bilaterally.    Psychiatric:         Behavior: Behavior is cooperative.           Assessment:      Encounter Diagnoses   Name Primary?    Controlled type 2 diabetes mellitus with chronic kidney disease on chronic dialysis, without long-term current use of insulin Yes    Onychomycosis due to dermatophyte        Plan:     Haroldo was seen today for diabetes mellitus and nail care.    Diagnoses and all orders for this visit:    Controlled type 2 diabetes mellitus with chronic kidney disease on chronic dialysis, without long-term current use of insulin    Onychomycosis due to dermatophyte    Other orders  -     ciclopirox (PENLAC) 8 % Soln; Apply topically nightly.        I counseled the patient on his conditions, their implications and medical management.      - Shoe inspection. Diabetic Foot Education. Patient reminded of the importance of good nutrition and blood sugar control to help prevent podiatric complications of diabetes. Patient instructed on proper foot hygeine. We discussed wearing proper shoe gear, daily foot inspections, never walking without protective shoe gear, never putting sharp instruments to feet, routine podiatric nail visits every 4-6 months.   - With patient's permission, nails were aggressively reduced and debrided x 10 to their soft tissue attachment mechanically and with electric , removing all offending nail and debris. Patient relates relief following the procedure. He will continue to monitor the areas daily, inspect his feet, wear protective shoe gear when ambulatory, moisturizer to maintain skin integrity and follow in this office in approximately 4-6 months, sooner p.r.n.     At patient's request, I discussed different treatments for toenail fungus. We discussed oral  antifungals but I did not recommend them as a first line treatment since the medication is taken internally and can have side effects such as rash, taste disturbances, and liver enzyme elevation. We discussed topical Penlac to be applied daily and removed weekly. Pt. Expresses understanding and would like to try the Penlac. Rx sent to the pharmacy.

## 2024-05-02 NOTE — PATIENT INSTRUCTIONS
Kerasal for fungal nails apply daily to all toenails.  Can be found at [facility]

## 2024-05-07 ENCOUNTER — TELEPHONE (OUTPATIENT)
Dept: TRANSPLANT | Facility: CLINIC | Age: 49
End: 2024-05-07
Payer: MEDICARE

## 2024-05-07 NOTE — TELEPHONE ENCOUNTER
MA notes per Adherence form     FOR THE PAST THREE MONTHS:    4-AMA's 3/7/24 56 MIN, 4/12/24 79 MIN pt sick 2/23/24 61 min transportation 2/11/24 74 min pt request    2-No-shows pt was hospitalized for the two days    No concerns with care giving, transportation, or mental health    Per adherence form pts sister Ariadne will be caregiver and will provide transportation for pt.     Scanned in pt's media    Natalie Bae  Abdominal Transplant MA

## 2024-05-14 ENCOUNTER — CLINICAL SUPPORT (OUTPATIENT)
Dept: REHABILITATION | Facility: HOSPITAL | Age: 49
End: 2024-05-14
Attending: INTERNAL MEDICINE
Payer: MEDICARE

## 2024-05-14 ENCOUNTER — TELEPHONE (OUTPATIENT)
Dept: TRANSPLANT | Facility: CLINIC | Age: 49
End: 2024-05-14
Payer: MEDICARE

## 2024-05-14 ENCOUNTER — DOCUMENTATION ONLY (OUTPATIENT)
Dept: TRANSPLANT | Facility: CLINIC | Age: 49
End: 2024-05-14
Payer: MEDICARE

## 2024-05-14 DIAGNOSIS — R26.2 AMBULATORY DYSFUNCTION: ICD-10-CM

## 2024-05-14 DIAGNOSIS — I69.351 HEMIPARESIS AFFECTING RIGHT SIDE AS LATE EFFECT OF CEREBROVASCULAR ACCIDENT: ICD-10-CM

## 2024-05-14 PROCEDURE — 97162 PT EVAL MOD COMPLEX 30 MIN: CPT | Mod: PN

## 2024-05-14 NOTE — TELEPHONE ENCOUNTER
----- Message from Ijeoma Casiano MD sent at 5/13/2024  8:35 PM CDT -----  I think he can come for eval and repeat a CT abd/pelvis    EB  ----- Message -----  From: Jorge Willard Jr., MD  Sent: 5/13/2024   7:28 PM CDT  To: Chao Martinez MD; Ijeoma Casiano MD; #     Possibly prohibitive. Sending to Dr. Casiano and Dr. Martinez.   I think he could probably come for eval.  ----- Message -----  From: Abadie, Michelle, RN  Sent: 5/10/2024   1:57 PM CDT  To: Jorge Willard Jr., MD    Hi Dr Willard,    We have received a referral on the above patient for possible evaluation. Please review CTSCAN to determine eligibility to come to [facility] clinic.  Thanks,  Gianna

## 2024-05-14 NOTE — PROGRESS NOTES
"[facility]  Physical Therapy Neurological Rehabilitation Initial Evaluation     Name: Haroldo Dickinson  Clinic Number: [id number]    Therapy Diagnosis:   Encounter Diagnoses   Name Primary?    Hemiparesis affecting right side as late effect of cerebrovascular accident     Ambulatory dysfunction      Physician: Mireya Larose MD    Physician Orders: PT Eval and Treat   Medical Diagnosis from Referral: I69.351 (ICD-10-CM) - Hemiparesis affecting right side as late effect of cerebrovascular accident R26.2 (ICD-10-CM) - Ambulatory dysfunction   Evaluation Date: 5/14/2024  Authorization Period Expiration: 4/4/2025  Plan of Care Expiration: Evaluation Only  Visit # / Visits authorized: 1/ 1    Time In: 3:30  Time Out: 4:15  Total Billable Time: 45 minutes    Subjective      Date of onset: 2013    History of current condition - Haroldo reports CVA in 2013, and having inpatient rehab and outpatient therapy following. He then didn't have OP therapy again until 2021 which was limited by insurance per patient. He reports he has new insurance now; and this is why he now presents to therapy. Patient reports being in a wheelchair immediately after CVA and progressing to rollator with first round of therapy. Since then, he has mostly used rollator when out in community; but he uses SPC inside his home. Patient reports, "I'll sometimes use the cane when I go places; but my legs, especially the right one, will get tired. Patient reports he is at same status as his last round of therapy, and is returning to work more to get better.   Patient also had MI 2 months ago.     Prior Therapy: Described above   Social History: lives alone  Falls: No falls or near-falls    DME: Straight cane and Rollator    Home Environment: 1st floor apartment   Family Present at time of Eval: None present    Prior Level of Function: Ind. before CVA in 2013  Current Level of Function: Mod I since 2021    Pain:  Current 0/10    Patient's " "goals: "I wanna get rid of this walker, and start walking better and get stronger."     Medical History:   Past Medical History:   Diagnosis Date    CAD (coronary artery disease), native coronary artery 11/21/2019    Cataract     Diabetes mellitus     Diabetic retinopathy     Dialysis patient     DM type 2 causing renal disease, not at goal     ESRD (end stage renal disease) started dialysis 01/2014 06/05/2014    History of colon polyps 02/10/2021    History of COVID-19 12/29/2022    Hyperparathyroidism, secondary renal 06/05/2014    Hypertension     NSTEMI (non-ST elevated myocardial infarction) 12/21/2013    Organ transplant candidate 06/05/2014    Pleural effusion 06/07/2024    Pneumonia     Post PTCA 08/26/2020    RCA [facility] 7-25-20    Renal cell carcinoma of right kidney     Renal hypertension     Stroke        Surgical History:   Haroldo Dickinson  has a past surgical history that includes Dialysis fistula creation; Vascular surgery; Colonoscopy (N/A, 5/3/2017); Fistulogram (N/A, 2/18/2019); Fistulogram (Right, 7/23/2019); Left heart catheterization (Left, 7/25/2019); Coronary stent placement (N/A, 7/25/2019); Colonoscopy (N/A, 2/9/2021); Colonoscopy (N/A, 2/10/2021); ANGIOGRAM, CORONARY, WITH LEFT HEART CATHETERIZATION (N/A, 7/1/2021); Retinal laser procedure (Bilateral, 2018 or 2017); Laparoscopic robot-assisted surgical removal of kidney using da Naveen Xi (Right, 5/19/2022); Laparoscopic robot-assisted surgical removal of kidney using da Naveen Xi (Left, 1/5/2023); repair (1/5/2023); and ANGIOGRAM, CORONARY, WITH LEFT HEART CATHETERIZATION (N/A, 11/28/2023).    Medications:   Haroldo has a current medication list which includes the following prescription(s): acetaminophen, aspirin, atorvastatin, carvedilol, cinacalcet, clopidogrel, epoetin philip, ezetimibe, hydralazine, isosorbide mononitrate, melatonin, minoxidil, nifedipine, nitroglycerin, polyethylene glycol, sevelamer carbonate, velphoro, and renaplex, and the " "following Facility-Administered Medications: lidocaine (pf) 10 mg/ml (1%).    Allergies:   Review of patient's allergies indicates:   Allergen Reactions    No known allergies         Objective      Clonus of left and right plantar-flexors    Modified Tita Scale otherwise WNL    Left quad tone: Fair        MMT     Left        Right   Hip flexion 3/5 3-/5   Knee extension  5/5 5/5   Knee flexion      Ankle dorsiflexion 4+/5 4+/5   Ankle plantarflexion     Hip abduction         Romberg: Negative    Gait with rollator: R > L knee-extension thrust, scissoring; Mod I    Gait with SPC (in R UE): increased R knee-extension thrust, asymmetric gait with decreased R stance; Mod I    Gait without AD: impairments above + reduced toe-off/ heel-strike; CGA    Timed Up and Go: 28 seconds with SPC     Treatment     Evaluation Only      Assessment     Mr. Dickinson is a 48 y.o. male referred to outpatient Physical Therapy with a medical diagnosis of  I69.351 (ICD-10-CM) - Hemiparesis affecting right side as late effect of cerebrovascular accident R26.2 (ICD-10-CM) - Ambulatory dysfunction . Patient presents with subjective report stated above, and with both subjective and objective assessments demonstrating need and appropriateness of skilled physical therapy. However, after evaluation day, patient reported that he has "too much going on right now" to make it to therapy appointments. He agreed to following up in the future with new referral, when he has ability to participate.         Plan     Plan of care Certification: 5/14/2024 to 5/14/2024   Evaluation Only      Cindy Hansen, PT, DPT         Physician's Signature: _________________________________________ Date: ________________    " Glycopyrrolate Pregnancy And Lactation Text: This medication is Pregnancy Category B and is considered safe during pregnancy. It is unknown if it is excreted breast milk.

## 2024-06-02 ENCOUNTER — NURSE TRIAGE (OUTPATIENT)
Dept: ADMINISTRATIVE | Facility: CLINIC | Age: 49
End: 2024-06-02
Payer: MEDICARE

## 2024-06-02 DIAGNOSIS — E78.2 MIXED HYPERLIPIDEMIA: ICD-10-CM

## 2024-06-02 RX ORDER — ISOSORBIDE MONONITRATE 60 MG/1
60 TABLET, EXTENDED RELEASE ORAL DAILY
Qty: 90 TABLET | Refills: 0 | Status: ON HOLD | OUTPATIENT
Start: 2024-06-02 | End: 2024-06-12

## 2024-06-02 RX ORDER — NIFEDIPINE 90 MG/1
90 TABLET, EXTENDED RELEASE ORAL DAILY
Qty: 90 TABLET | Refills: 0 | Status: SHIPPED | OUTPATIENT
Start: 2024-06-02 | End: 2025-06-02

## 2024-06-02 RX ORDER — NIFEDIPINE 90 MG/1
90 TABLET, EXTENDED RELEASE ORAL DAILY
Qty: 90 TABLET | Refills: 0 | Status: SHIPPED | OUTPATIENT
Start: 2024-06-02 | End: 2024-06-02 | Stop reason: SDUPTHER

## 2024-06-02 NOTE — TELEPHONE ENCOUNTER
Patient called for a refill of his daily medications that include the following:     Plavix 75 mg  Coreg 50 mg  Zetia 10 mg  Aspirin 81 mg  Imdur 60 mg  Nifedipine 90 mg    Patient advised that refills are available for Plavix, Coreg, Zetia and Aspirin and no refills available for his prescriptions for Imdur and Nifedipine. Patient call transferred to the [facility] so that patient can submit refill requests for available prescriptions and the On Call Provider paged for assistance with refills for Imdur 60 mg and Nifedipine 90 mg.     On Call Provider, Dr. Kelly Taveras, states she will submit patient's refills for Imdur 60 mg and Nifedipine 90 mg to patient's Pharmacy, [facility].     Patient advised that his prescriptions for Imdur 60 mg and Nifedipine 90 mg have been submitted to [facility] by the On Call Provider. Patient also advised to contact the [facility] Triage Service for any additional needs/questions/worsening symptoms. Patient states understanding of care advice.       Reason for Disposition   [1] Prescription refill request for ESSENTIAL medicine (i.e., likelihood of harm to patient if not taken) AND [2] triager unable to refill per department policy    Additional Information   Negative: New-onset or worsening symptoms, see that guideline (e.g., diarrhea, runny nose, sore throat)   Negative: Medicine question not related to refill or renewal   Negative: Caller (e.g., patient or pharmacist) requesting information about a new medicine   Negative: Caller requesting information unrelated to medicine    Protocols used: Medication Refill and Renewal Call-A-

## 2024-06-03 RX ORDER — EZETIMIBE 10 MG/1
10 TABLET ORAL DAILY
Qty: 90 TABLET | Refills: 3 | Status: SHIPPED | OUTPATIENT
Start: 2024-06-03 | End: 2025-06-03

## 2024-06-03 RX ORDER — CARVEDILOL 25 MG/1
50 TABLET ORAL 2 TIMES DAILY WITH MEALS
Qty: 360 TABLET | Refills: 3 | Status: ON HOLD | OUTPATIENT
Start: 2024-06-03 | End: 2024-06-12

## 2024-06-03 RX ORDER — CLOPIDOGREL BISULFATE 75 MG/1
75 TABLET ORAL DAILY
Qty: 90 TABLET | Refills: 3 | Status: ON HOLD | OUTPATIENT
Start: 2024-06-03 | End: 2024-06-12 | Stop reason: HOSPADM

## 2024-06-07 ENCOUNTER — HOSPITAL ENCOUNTER (INPATIENT)
Facility: HOSPITAL | Age: 49
LOS: 5 days | Discharge: HOME | DRG: 280 | End: 2024-06-12
Attending: EMERGENCY MEDICINE | Admitting: STUDENT IN AN ORGANIZED HEALTH CARE EDUCATION/TRAINING PROGRAM
Payer: MEDICARE

## 2024-06-07 DIAGNOSIS — I10 ESSENTIAL HYPERTENSION: ICD-10-CM

## 2024-06-07 DIAGNOSIS — I50.33 ACUTE ON CHRONIC DIASTOLIC HEART FAILURE: ICD-10-CM

## 2024-06-07 DIAGNOSIS — R79.89 ELEVATED TROPONIN: ICD-10-CM

## 2024-06-07 DIAGNOSIS — N18.6 ESRD ON HEMODIALYSIS: ICD-10-CM

## 2024-06-07 DIAGNOSIS — I21.4 NSTEMI (NON-ST ELEVATED MYOCARDIAL INFARCTION): Primary | ICD-10-CM

## 2024-06-07 DIAGNOSIS — R07.9 CHEST PAIN: ICD-10-CM

## 2024-06-07 DIAGNOSIS — Z99.2 ESRD ON HEMODIALYSIS: ICD-10-CM

## 2024-06-07 PROBLEM — J90 PLEURAL EFFUSION: Status: ACTIVE | Noted: 2024-06-07

## 2024-06-07 LAB
ALBUMIN SERPL BCP-MCNC: 3 G/DL (ref 3.5–5.2)
ALLENS TEST: ABNORMAL
ALP SERPL-CCNC: 72 U/L (ref 55–135)
ALT SERPL W/O P-5'-P-CCNC: 13 U/L (ref 10–44)
ANION GAP SERPL CALC-SCNC: 13 MMOL/L (ref 8–16)
APTT PPP: 28.3 SEC (ref 21–32)
APTT PPP: 33.3 SEC (ref 21–32)
APTT PPP: 34.7 SEC (ref 21–32)
ASCENDING AORTA: 3.89 CM
AST SERPL-CCNC: 16 U/L (ref 10–40)
AV INDEX (PROSTH): 0.57
AV MEAN GRADIENT: 12 MMHG
AV PEAK GRADIENT: 22 MMHG
AV VALVE AREA BY VELOCITY RATIO: 1.85 CM²
AV VALVE AREA: 2.14 CM²
AV VELOCITY RATIO: 0.5
BASOPHILS # BLD AUTO: 0.11 K/UL (ref 0–0.2)
BASOPHILS # BLD AUTO: 0.14 K/UL (ref 0–0.2)
BASOPHILS NFR BLD: 0.9 % (ref 0–1.9)
BASOPHILS NFR BLD: 1.1 % (ref 0–1.9)
BILIRUB SERPL-MCNC: 0.7 MG/DL (ref 0.1–1)
BNP SERPL-MCNC: 1542 PG/ML (ref 0–99)
BSA FOR ECHO PROCEDURE: 1.9 M2
BUN SERPL-MCNC: 41 MG/DL (ref 6–20)
CALCIUM SERPL-MCNC: 10.6 MG/DL (ref 8.7–10.5)
CHLORIDE SERPL-SCNC: 100 MMOL/L (ref 95–110)
CO2 SERPL-SCNC: 23 MMOL/L (ref 23–29)
CREAT SERPL-MCNC: 9.4 MG/DL (ref 0.5–1.4)
CV ECHO LV RWT: 0.48 CM
D DIMER PPP IA.FEU-MCNC: 4.14 MG/L FEU
DIFFERENTIAL METHOD BLD: ABNORMAL
DIFFERENTIAL METHOD BLD: ABNORMAL
DOP CALC AO PEAK VEL: 2.34 M/S
DOP CALC AO VTI: 45.06 CM
DOP CALC LVOT AREA: 3.7 CM2
DOP CALC LVOT DIAMETER: 2.18 CM
DOP CALC LVOT PEAK VEL: 1.16 M/S
DOP CALC LVOT STROKE VOLUME: 96.25 CM3
DOP CALCLVOT PEAK VEL VTI: 25.8 CM
E WAVE DECELERATION TIME: 126.64 MSEC
E/A RATIO: 2.29
E/E' RATIO: 16.71 M/S
ECHO LV POSTERIOR WALL: 1.33 CM (ref 0.6–1.1)
EOSINOPHIL # BLD AUTO: 0.3 K/UL (ref 0–0.5)
EOSINOPHIL # BLD AUTO: 0.5 K/UL (ref 0–0.5)
EOSINOPHIL NFR BLD: 2.2 % (ref 0–8)
EOSINOPHIL NFR BLD: 3.9 % (ref 0–8)
ERYTHROCYTE [DISTWIDTH] IN BLOOD BY AUTOMATED COUNT: 15 % (ref 11.5–14.5)
ERYTHROCYTE [DISTWIDTH] IN BLOOD BY AUTOMATED COUNT: 15.2 % (ref 11.5–14.5)
EST. GFR  (NO RACE VARIABLE): 6.4 ML/MIN/1.73 M^2
FRACTIONAL SHORTENING: 35 % (ref 28–44)
GLUCOSE SERPL-MCNC: 105 MG/DL (ref 70–110)
HCO3 UR-SCNC: 28.4 MMOL/L (ref 24–28)
HCT VFR BLD AUTO: 31.1 % (ref 40–54)
HCT VFR BLD AUTO: 31.6 % (ref 40–54)
HGB BLD-MCNC: 9.3 G/DL (ref 14–18)
HGB BLD-MCNC: 9.7 G/DL (ref 14–18)
IMM GRANULOCYTES # BLD AUTO: 0.05 K/UL (ref 0–0.04)
IMM GRANULOCYTES # BLD AUTO: 0.05 K/UL (ref 0–0.04)
IMM GRANULOCYTES NFR BLD AUTO: 0.4 % (ref 0–0.5)
IMM GRANULOCYTES NFR BLD AUTO: 0.4 % (ref 0–0.5)
INFLUENZA A, MOLECULAR: NOT DETECTED
INFLUENZA B, MOLECULAR: NOT DETECTED
INR PPP: 1 (ref 0.8–1.2)
INTERVENTRICULAR SEPTUM: 1.08 CM (ref 0.6–1.1)
LA MAJOR: 5.81 CM
LA MINOR: 4.83 CM
LA WIDTH: 5.1 CM
LACTATE SERPL-SCNC: 0.6 MMOL/L (ref 0.5–2.2)
LACTATE SERPL-SCNC: 0.6 MMOL/L (ref 0.5–2.2)
LEFT ATRIUM SIZE: 4.93 CM
LEFT ATRIUM VOLUME INDEX MOD: 46.9 ML/M2
LEFT ATRIUM VOLUME INDEX: 60 ML/M2
LEFT ATRIUM VOLUME MOD: 88.18 CM3
LEFT ATRIUM VOLUME: 112.73 CM3
LEFT INTERNAL DIMENSION IN SYSTOLE: 3.59 CM (ref 2.1–4)
LEFT VENTRICLE DIASTOLIC VOLUME INDEX: 80.54 ML/M2
LEFT VENTRICLE DIASTOLIC VOLUME: 151.41 ML
LEFT VENTRICLE MASS INDEX: 148 G/M2
LEFT VENTRICLE SYSTOLIC VOLUME INDEX: 28.8 ML/M2
LEFT VENTRICLE SYSTOLIC VOLUME: 54.23 ML
LEFT VENTRICULAR INTERNAL DIMENSION IN DIASTOLE: 5.56 CM (ref 3.5–6)
LEFT VENTRICULAR MASS: 278.81 G
LV LATERAL E/E' RATIO: 13 M/S
LV SEPTAL E/E' RATIO: 23.4 M/S
LYMPHOCYTES # BLD AUTO: 1.2 K/UL (ref 1–4.8)
LYMPHOCYTES # BLD AUTO: 1.4 K/UL (ref 1–4.8)
LYMPHOCYTES NFR BLD: 11.3 % (ref 18–48)
LYMPHOCYTES NFR BLD: 9.8 % (ref 18–48)
MCH RBC QN AUTO: 28.5 PG (ref 27–31)
MCH RBC QN AUTO: 29.2 PG (ref 27–31)
MCHC RBC AUTO-ENTMCNC: 29.4 G/DL (ref 32–36)
MCHC RBC AUTO-ENTMCNC: 31.2 G/DL (ref 32–36)
MCV RBC AUTO: 94 FL (ref 82–98)
MCV RBC AUTO: 97 FL (ref 82–98)
MONOCYTES # BLD AUTO: 1.1 K/UL (ref 0.3–1)
MONOCYTES # BLD AUTO: 1.3 K/UL (ref 0.3–1)
MONOCYTES NFR BLD: 10.5 % (ref 4–15)
MONOCYTES NFR BLD: 8.4 % (ref 4–15)
MV PEAK A VEL: 0.51 M/S
MV PEAK E VEL: 1.17 M/S
MV STENOSIS PRESSURE HALF TIME: 36.73 MS
MV VALVE AREA P 1/2 METHOD: 5.99 CM2
NEUTROPHILS # BLD AUTO: 8.9 K/UL (ref 1.8–7.7)
NEUTROPHILS # BLD AUTO: 9.8 K/UL (ref 1.8–7.7)
NEUTROPHILS NFR BLD: 73 % (ref 38–73)
NEUTROPHILS NFR BLD: 78.1 % (ref 38–73)
NRBC BLD-RTO: 0 /100 WBC
NRBC BLD-RTO: 0 /100 WBC
OHS CV RV/LV RATIO: 0.67 CM
OHS QRS DURATION: 94 MS
OHS QRS DURATION: 98 MS
OHS QRS DURATION: 98 MS
OHS QTC CALCULATION: 460 MS
OHS QTC CALCULATION: 479 MS
OHS QTC CALCULATION: 480 MS
PCO2 BLDA: 46.6 MMHG (ref 35–45)
PH SMN: 7.39 [PH] (ref 7.35–7.45)
PISA TR MAX VEL: 1.6 M/S
PLATELET # BLD AUTO: 311 K/UL (ref 150–450)
PLATELET # BLD AUTO: 333 K/UL (ref 150–450)
PMV BLD AUTO: 9.9 FL (ref 9.2–12.9)
PMV BLD AUTO: 9.9 FL (ref 9.2–12.9)
PO2 BLDA: 51 MMHG (ref 40–60)
POC BE: 3 MMOL/L
POC SATURATED O2: 85 % (ref 95–100)
POC TCO2: 30 MMOL/L (ref 24–29)
POCT GLUCOSE: 146 MG/DL (ref 70–110)
POTASSIUM SERPL-SCNC: 3.7 MMOL/L (ref 3.5–5.1)
PROT SERPL-MCNC: 7.4 G/DL (ref 6–8.4)
PROTHROMBIN TIME: 11.2 SEC (ref 9–12.5)
PULM VEIN S/D RATIO: 0.42
PV PEAK D VEL: 0.69 M/S
PV PEAK S VEL: 0.29 M/S
RA MAJOR: 4.33 CM
RA PRESSURE ESTIMATED: 15 MMHG
RA WIDTH: 3.51 CM
RBC # BLD AUTO: 3.26 M/UL (ref 4.6–6.2)
RBC # BLD AUTO: 3.32 M/UL (ref 4.6–6.2)
RIGHT VENTRICULAR END-DIASTOLIC DIMENSION: 3.73 CM
RSV AG BY MOLECULAR METHOD: NOT DETECTED
RV TB RVSP: 17 MMHG
SAMPLE: ABNORMAL
SARS-COV-2 RNA RESP QL NAA+PROBE: NOT DETECTED
SINUS: 3.68 CM
SITE: ABNORMAL
SODIUM SERPL-SCNC: 136 MMOL/L (ref 136–145)
STJ: 2.56 CM
TDI LATERAL: 0.09 M/S
TDI SEPTAL: 0.05 M/S
TDI: 0.07 M/S
TR MAX PG: 10 MMHG
TRICUSPID ANNULAR PLANE SYSTOLIC EXCURSION: 1.75 CM
TROPONIN I SERPL DL<=0.01 NG/ML-MCNC: 0.03 NG/ML (ref 0–0.03)
TROPONIN I SERPL DL<=0.01 NG/ML-MCNC: 0.16 NG/ML (ref 0–0.03)
TROPONIN I SERPL DL<=0.01 NG/ML-MCNC: 0.31 NG/ML (ref 0–0.03)
TROPONIN I SERPL DL<=0.01 NG/ML-MCNC: 12.08 NG/ML (ref 0–0.03)
TROPONIN I SERPL DL<=0.01 NG/ML-MCNC: 3.07 NG/ML (ref 0–0.03)
TROPONIN I SERPL DL<=0.01 NG/ML-MCNC: 9.68 NG/ML (ref 0–0.03)
TV REST PULMONARY ARTERY PRESSURE: 25 MMHG
WBC # BLD AUTO: 12.19 K/UL (ref 3.9–12.7)
WBC # BLD AUTO: 12.51 K/UL (ref 3.9–12.7)
Z-SCORE OF LEFT VENTRICULAR DIMENSION IN END DIASTOLE: 0.57
Z-SCORE OF LEFT VENTRICULAR DIMENSION IN END SYSTOLE: 0.82

## 2024-06-07 PROCEDURE — 94640 AIRWAY INHALATION TREATMENT: CPT

## 2024-06-07 PROCEDURE — 85379 FIBRIN DEGRADATION QUANT: CPT | Performed by: EMERGENCY MEDICINE

## 2024-06-07 PROCEDURE — 27000221 HC OXYGEN, UP TO 24 HOURS

## 2024-06-07 PROCEDURE — 96374 THER/PROPH/DIAG INJ IV PUSH: CPT | Mod: 59

## 2024-06-07 PROCEDURE — 25000242 PHARM REV CODE 250 ALT 637 W/ HCPCS: Performed by: NURSE PRACTITIONER

## 2024-06-07 PROCEDURE — 93005 ELECTROCARDIOGRAM TRACING: CPT

## 2024-06-07 PROCEDURE — 93010 ELECTROCARDIOGRAM REPORT: CPT | Mod: 76,,, | Performed by: INTERNAL MEDICINE

## 2024-06-07 PROCEDURE — 85730 THROMBOPLASTIN TIME PARTIAL: CPT | Performed by: STUDENT IN AN ORGANIZED HEALTH CARE EDUCATION/TRAINING PROGRAM

## 2024-06-07 PROCEDURE — 25000242 PHARM REV CODE 250 ALT 637 W/ HCPCS: Performed by: EMERGENCY MEDICINE

## 2024-06-07 PROCEDURE — 99900035 HC TECH TIME PER 15 MIN (STAT)

## 2024-06-07 PROCEDURE — 25000003 PHARM REV CODE 250: Performed by: STUDENT IN AN ORGANIZED HEALTH CARE EDUCATION/TRAINING PROGRAM

## 2024-06-07 PROCEDURE — 36415 COLL VENOUS BLD VENIPUNCTURE: CPT | Performed by: STUDENT IN AN ORGANIZED HEALTH CARE EDUCATION/TRAINING PROGRAM

## 2024-06-07 PROCEDURE — 99285 EMERGENCY DEPT VISIT HI MDM: CPT | Mod: 25

## 2024-06-07 PROCEDURE — 85610 PROTHROMBIN TIME: CPT | Performed by: STUDENT IN AN ORGANIZED HEALTH CARE EDUCATION/TRAINING PROGRAM

## 2024-06-07 PROCEDURE — 96366 THER/PROPH/DIAG IV INF ADDON: CPT

## 2024-06-07 PROCEDURE — 25500020 PHARM REV CODE 255: Performed by: EMERGENCY MEDICINE

## 2024-06-07 PROCEDURE — 25000003 PHARM REV CODE 250: Performed by: INTERNAL MEDICINE

## 2024-06-07 PROCEDURE — 25000003 PHARM REV CODE 250: Performed by: PHYSICIAN ASSISTANT

## 2024-06-07 PROCEDURE — 80100014 HC HEMODIALYSIS 1:1

## 2024-06-07 PROCEDURE — 27100171 HC OXYGEN HIGH FLOW UP TO 24 HOURS

## 2024-06-07 PROCEDURE — 85025 COMPLETE CBC W/AUTO DIFF WBC: CPT | Performed by: EMERGENCY MEDICINE

## 2024-06-07 PROCEDURE — 83605 ASSAY OF LACTIC ACID: CPT | Mod: 91 | Performed by: PHYSICIAN ASSISTANT

## 2024-06-07 PROCEDURE — 0241U SARS-COV2 (COVID) WITH FLU/RSV BY PCR: CPT | Performed by: PHYSICIAN ASSISTANT

## 2024-06-07 PROCEDURE — 63600175 PHARM REV CODE 636 W HCPCS: Performed by: PHYSICIAN ASSISTANT

## 2024-06-07 PROCEDURE — 93010 ELECTROCARDIOGRAM REPORT: CPT | Mod: ,,, | Performed by: INTERNAL MEDICINE

## 2024-06-07 PROCEDURE — 96375 TX/PRO/DX INJ NEW DRUG ADDON: CPT

## 2024-06-07 PROCEDURE — 25000003 PHARM REV CODE 250: Performed by: NURSE PRACTITIONER

## 2024-06-07 PROCEDURE — 85730 THROMBOPLASTIN TIME PARTIAL: CPT | Mod: 91 | Performed by: STUDENT IN AN ORGANIZED HEALTH CARE EDUCATION/TRAINING PROGRAM

## 2024-06-07 PROCEDURE — 84484 ASSAY OF TROPONIN QUANT: CPT | Mod: 91 | Performed by: EMERGENCY MEDICINE

## 2024-06-07 PROCEDURE — 99222 1ST HOSP IP/OBS MODERATE 55: CPT | Mod: ,,, | Performed by: INTERNAL MEDICINE

## 2024-06-07 PROCEDURE — 99232 SBSQ HOSP IP/OBS MODERATE 35: CPT | Mod: ,,, | Performed by: INTERNAL MEDICINE

## 2024-06-07 PROCEDURE — 20600001 HC STEP DOWN PRIVATE ROOM

## 2024-06-07 PROCEDURE — 5A0945A ASSISTANCE WITH RESPIRATORY VENTILATION, 24-96 CONSECUTIVE HOURS, HIGH NASAL FLOW/VELOCITY: ICD-10-PCS | Performed by: STUDENT IN AN ORGANIZED HEALTH CARE EDUCATION/TRAINING PROGRAM

## 2024-06-07 PROCEDURE — 63600175 PHARM REV CODE 636 W HCPCS: Performed by: EMERGENCY MEDICINE

## 2024-06-07 PROCEDURE — 5A1D70Z PERFORMANCE OF URINARY FILTRATION, INTERMITTENT, LESS THAN 6 HOURS PER DAY: ICD-10-PCS | Performed by: INTERNAL MEDICINE

## 2024-06-07 PROCEDURE — 94761 N-INVAS EAR/PLS OXIMETRY MLT: CPT | Mod: XB

## 2024-06-07 PROCEDURE — 96365 THER/PROPH/DIAG IV INF INIT: CPT

## 2024-06-07 PROCEDURE — 83880 ASSAY OF NATRIURETIC PEPTIDE: CPT | Performed by: EMERGENCY MEDICINE

## 2024-06-07 PROCEDURE — 87040 BLOOD CULTURE FOR BACTERIA: CPT | Mod: 59 | Performed by: PHYSICIAN ASSISTANT

## 2024-06-07 PROCEDURE — 84484 ASSAY OF TROPONIN QUANT: CPT | Mod: 91 | Performed by: PHYSICIAN ASSISTANT

## 2024-06-07 PROCEDURE — 63600175 PHARM REV CODE 636 W HCPCS: Performed by: STUDENT IN AN ORGANIZED HEALTH CARE EDUCATION/TRAINING PROGRAM

## 2024-06-07 PROCEDURE — 84484 ASSAY OF TROPONIN QUANT: CPT | Mod: 91 | Performed by: STUDENT IN AN ORGANIZED HEALTH CARE EDUCATION/TRAINING PROGRAM

## 2024-06-07 PROCEDURE — 85025 COMPLETE CBC W/AUTO DIFF WBC: CPT | Mod: 91 | Performed by: STUDENT IN AN ORGANIZED HEALTH CARE EDUCATION/TRAINING PROGRAM

## 2024-06-07 PROCEDURE — 82803 BLOOD GASES ANY COMBINATION: CPT

## 2024-06-07 PROCEDURE — 63700000 PHARM REV CODE 250 ALT 637 W/O HCPCS: Performed by: PHYSICIAN ASSISTANT

## 2024-06-07 PROCEDURE — 80053 COMPREHEN METABOLIC PANEL: CPT | Performed by: EMERGENCY MEDICINE

## 2024-06-07 RX ORDER — HYDRALAZINE HYDROCHLORIDE 25 MG/1
50 TABLET, FILM COATED ORAL EVERY 12 HOURS
Status: DISCONTINUED | OUTPATIENT
Start: 2024-06-07 | End: 2024-06-07

## 2024-06-07 RX ORDER — HEPARIN SODIUM,PORCINE/D5W 25000/250
0-40 INTRAVENOUS SOLUTION INTRAVENOUS CONTINUOUS
Status: DISCONTINUED | OUTPATIENT
Start: 2024-06-07 | End: 2024-06-12 | Stop reason: HOSPADM

## 2024-06-07 RX ORDER — LABETALOL HCL 20 MG/4 ML
20 SYRINGE (ML) INTRAVENOUS EVERY 6 HOURS PRN
Status: DISCONTINUED | OUTPATIENT
Start: 2024-06-07 | End: 2024-06-12 | Stop reason: HOSPADM

## 2024-06-07 RX ORDER — AZITHROMYCIN 250 MG/1
250 TABLET, FILM COATED ORAL DAILY
Status: DISCONTINUED | OUTPATIENT
Start: 2024-06-08 | End: 2024-06-11

## 2024-06-07 RX ORDER — IBUPROFEN 200 MG
24 TABLET ORAL
Status: DISCONTINUED | OUTPATIENT
Start: 2024-06-07 | End: 2024-06-12 | Stop reason: HOSPADM

## 2024-06-07 RX ORDER — ISOSORBIDE MONONITRATE 60 MG/1
120 TABLET, EXTENDED RELEASE ORAL DAILY
Status: DISCONTINUED | OUTPATIENT
Start: 2024-06-07 | End: 2024-06-12 | Stop reason: HOSPADM

## 2024-06-07 RX ORDER — SODIUM CHLORIDE 9 MG/ML
INJECTION, SOLUTION INTRAVENOUS
Status: CANCELLED | OUTPATIENT
Start: 2024-06-07

## 2024-06-07 RX ORDER — IPRATROPIUM BROMIDE AND ALBUTEROL SULFATE 2.5; .5 MG/3ML; MG/3ML
3 SOLUTION RESPIRATORY (INHALATION)
Status: DISCONTINUED | OUTPATIENT
Start: 2024-06-07 | End: 2024-06-11

## 2024-06-07 RX ORDER — IBUPROFEN 200 MG
16 TABLET ORAL
Status: DISCONTINUED | OUTPATIENT
Start: 2024-06-07 | End: 2024-06-12 | Stop reason: HOSPADM

## 2024-06-07 RX ORDER — SODIUM CHLORIDE 9 MG/ML
INJECTION, SOLUTION INTRAVENOUS ONCE
Status: COMPLETED | OUTPATIENT
Start: 2024-06-07 | End: 2024-06-07

## 2024-06-07 RX ORDER — PROMETHAZINE HYDROCHLORIDE 25 MG/1
25 TABLET ORAL EVERY 6 HOURS PRN
Status: DISCONTINUED | OUTPATIENT
Start: 2024-06-07 | End: 2024-06-12 | Stop reason: HOSPADM

## 2024-06-07 RX ORDER — BISACODYL 10 MG/1
10 SUPPOSITORY RECTAL DAILY PRN
Status: DISCONTINUED | OUTPATIENT
Start: 2024-06-07 | End: 2024-06-12 | Stop reason: HOSPADM

## 2024-06-07 RX ORDER — TALC
6 POWDER (GRAM) TOPICAL NIGHTLY PRN
Status: DISCONTINUED | OUTPATIENT
Start: 2024-06-07 | End: 2024-06-12 | Stop reason: HOSPADM

## 2024-06-07 RX ORDER — CLOPIDOGREL BISULFATE 300 MG/1
300 TABLET, FILM COATED ORAL ONCE
Status: DISCONTINUED | OUTPATIENT
Start: 2024-06-07 | End: 2024-06-07

## 2024-06-07 RX ORDER — HYDROMORPHONE HYDROCHLORIDE 1 MG/ML
0.5 INJECTION, SOLUTION INTRAMUSCULAR; INTRAVENOUS; SUBCUTANEOUS
Status: COMPLETED | OUTPATIENT
Start: 2024-06-07 | End: 2024-06-07

## 2024-06-07 RX ORDER — CARVEDILOL 25 MG/1
50 TABLET ORAL 2 TIMES DAILY
Status: DISCONTINUED | OUTPATIENT
Start: 2024-06-07 | End: 2024-06-12 | Stop reason: HOSPADM

## 2024-06-07 RX ORDER — GLUCAGON 1 MG
1 KIT INJECTION
Status: DISCONTINUED | OUTPATIENT
Start: 2024-06-07 | End: 2024-06-12 | Stop reason: HOSPADM

## 2024-06-07 RX ORDER — POLYETHYLENE GLYCOL 3350 17 G/17G
17 POWDER, FOR SOLUTION ORAL DAILY PRN
Status: DISCONTINUED | OUTPATIENT
Start: 2024-06-07 | End: 2024-06-12 | Stop reason: HOSPADM

## 2024-06-07 RX ORDER — BENZONATATE 100 MG/1
100 CAPSULE ORAL 3 TIMES DAILY PRN
Status: DISCONTINUED | OUTPATIENT
Start: 2024-06-07 | End: 2024-06-12 | Stop reason: HOSPADM

## 2024-06-07 RX ORDER — ONDANSETRON HYDROCHLORIDE 2 MG/ML
4 INJECTION, SOLUTION INTRAVENOUS
Status: COMPLETED | OUTPATIENT
Start: 2024-06-07 | End: 2024-06-07

## 2024-06-07 RX ORDER — SODIUM CHLORIDE 9 MG/ML
INJECTION, SOLUTION INTRAVENOUS ONCE
Status: CANCELLED | OUTPATIENT
Start: 2024-06-07 | End: 2024-06-07

## 2024-06-07 RX ORDER — EZETIMIBE 10 MG/1
10 TABLET ORAL DAILY
Status: DISCONTINUED | OUTPATIENT
Start: 2024-06-07 | End: 2024-06-12 | Stop reason: HOSPADM

## 2024-06-07 RX ORDER — HYDRALAZINE HYDROCHLORIDE 50 MG/1
50 TABLET, FILM COATED ORAL EVERY 12 HOURS
Status: DISCONTINUED | OUTPATIENT
Start: 2024-06-07 | End: 2024-06-08

## 2024-06-07 RX ORDER — CLOPIDOGREL BISULFATE 75 MG/1
75 TABLET ORAL DAILY
Status: DISCONTINUED | OUTPATIENT
Start: 2024-06-07 | End: 2024-06-12 | Stop reason: HOSPADM

## 2024-06-07 RX ORDER — SEVELAMER CARBONATE 800 MG/1
800 TABLET, FILM COATED ORAL
Status: DISCONTINUED | OUTPATIENT
Start: 2024-06-07 | End: 2024-06-10

## 2024-06-07 RX ORDER — AZITHROMYCIN 250 MG/1
500 TABLET, FILM COATED ORAL ONCE
Status: COMPLETED | OUTPATIENT
Start: 2024-06-07 | End: 2024-06-07

## 2024-06-07 RX ORDER — IPRATROPIUM BROMIDE AND ALBUTEROL SULFATE 2.5; .5 MG/3ML; MG/3ML
3 SOLUTION RESPIRATORY (INHALATION) EVERY 4 HOURS PRN
Status: DISCONTINUED | OUTPATIENT
Start: 2024-06-07 | End: 2024-06-08

## 2024-06-07 RX ORDER — ATORVASTATIN CALCIUM 20 MG/1
80 TABLET, FILM COATED ORAL DAILY
Status: DISCONTINUED | OUTPATIENT
Start: 2024-06-07 | End: 2024-06-12 | Stop reason: HOSPADM

## 2024-06-07 RX ORDER — NITROGLYCERIN 20 MG/100ML
5 INJECTION INTRAVENOUS CONTINUOUS
Status: DISCONTINUED | OUTPATIENT
Start: 2024-06-07 | End: 2024-06-07

## 2024-06-07 RX ORDER — NITROGLYCERIN 0.4 MG/1
0.4 TABLET SUBLINGUAL
Status: COMPLETED | OUTPATIENT
Start: 2024-06-07 | End: 2024-06-07

## 2024-06-07 RX ORDER — HYDRALAZINE HYDROCHLORIDE 20 MG/ML
10 INJECTION INTRAMUSCULAR; INTRAVENOUS EVERY 6 HOURS PRN
Status: DISCONTINUED | OUTPATIENT
Start: 2024-06-07 | End: 2024-06-12 | Stop reason: HOSPADM

## 2024-06-07 RX ORDER — CLOPIDOGREL BISULFATE 75 MG/1
75 TABLET ORAL ONCE
Status: DISCONTINUED | OUTPATIENT
Start: 2024-06-07 | End: 2024-06-07

## 2024-06-07 RX ORDER — ACETAMINOPHEN 325 MG/1
650 TABLET ORAL EVERY 4 HOURS PRN
Status: DISCONTINUED | OUTPATIENT
Start: 2024-06-07 | End: 2024-06-12 | Stop reason: HOSPADM

## 2024-06-07 RX ORDER — ASPIRIN 325 MG
325 TABLET ORAL
Status: DISCONTINUED | OUTPATIENT
Start: 2024-06-07 | End: 2024-06-07

## 2024-06-07 RX ORDER — ONDANSETRON 8 MG/1
8 TABLET, ORALLY DISINTEGRATING ORAL EVERY 8 HOURS PRN
Status: DISCONTINUED | OUTPATIENT
Start: 2024-06-07 | End: 2024-06-12 | Stop reason: HOSPADM

## 2024-06-07 RX ORDER — MUPIROCIN 20 MG/G
OINTMENT TOPICAL 2 TIMES DAILY
Status: DISCONTINUED | OUTPATIENT
Start: 2024-06-07 | End: 2024-06-12 | Stop reason: HOSPADM

## 2024-06-07 RX ORDER — NAPROXEN SODIUM 220 MG/1
81 TABLET, FILM COATED ORAL DAILY
Status: DISCONTINUED | OUTPATIENT
Start: 2024-06-07 | End: 2024-06-12 | Stop reason: HOSPADM

## 2024-06-07 RX ADMIN — SEVELAMER CARBONATE 800 MG: 800 TABLET, FILM COATED ORAL at 05:06

## 2024-06-07 RX ADMIN — ATORVASTATIN CALCIUM 80 MG: 40 TABLET, FILM COATED ORAL at 12:06

## 2024-06-07 RX ADMIN — CARVEDILOL 50 MG: 25 TABLET, FILM COATED ORAL at 08:06

## 2024-06-07 RX ADMIN — ISOSORBIDE MONONITRATE 120 MG: 60 TABLET, EXTENDED RELEASE ORAL at 08:06

## 2024-06-07 RX ADMIN — EZETIMIBE 10 MG: 10 TABLET ORAL at 12:06

## 2024-06-07 RX ADMIN — HEPARIN SODIUM 12 UNITS/KG/HR: 10000 INJECTION, SOLUTION INTRAVENOUS at 08:06

## 2024-06-07 RX ADMIN — ONDANSETRON 4 MG: 2 INJECTION INTRAMUSCULAR; INTRAVENOUS at 03:06

## 2024-06-07 RX ADMIN — MUPIROCIN: 20 OINTMENT TOPICAL at 10:06

## 2024-06-07 RX ADMIN — HYDRALAZINE HYDROCHLORIDE 10 MG: 20 INJECTION, SOLUTION INTRAMUSCULAR; INTRAVENOUS at 11:06

## 2024-06-07 RX ADMIN — CARVEDILOL 50 MG: 25 TABLET, FILM COATED ORAL at 10:06

## 2024-06-07 RX ADMIN — NITROGLYCERIN 0.4 MG: 0.4 TABLET, ORALLY DISINTEGRATING SUBLINGUAL at 03:06

## 2024-06-07 RX ADMIN — HYDRALAZINE HYDROCHLORIDE 50 MG: 50 TABLET ORAL at 10:06

## 2024-06-07 RX ADMIN — HEPARIN SODIUM 14 UNITS/KG/HR: 10000 INJECTION, SOLUTION INTRAVENOUS at 03:06

## 2024-06-07 RX ADMIN — NIFEDIPINE 90 MG: 60 TABLET, FILM COATED, EXTENDED RELEASE ORAL at 08:06

## 2024-06-07 RX ADMIN — AZITHROMYCIN DIHYDRATE 500 MG: 250 TABLET ORAL at 12:06

## 2024-06-07 RX ADMIN — SEVELAMER CARBONATE 800 MG: 800 TABLET, FILM COATED ORAL at 12:06

## 2024-06-07 RX ADMIN — IOHEXOL 75 ML: 350 INJECTION, SOLUTION INTRAVENOUS at 06:06

## 2024-06-07 RX ADMIN — CEFTRIAXONE 1 G: 1 INJECTION, POWDER, FOR SOLUTION INTRAMUSCULAR; INTRAVENOUS at 12:06

## 2024-06-07 RX ADMIN — HYDROMORPHONE HYDROCHLORIDE 0.5 MG: 1 INJECTION, SOLUTION INTRAMUSCULAR; INTRAVENOUS; SUBCUTANEOUS at 05:06

## 2024-06-07 RX ADMIN — SODIUM CHLORIDE: 9 INJECTION, SOLUTION INTRAVENOUS at 02:06

## 2024-06-07 RX ADMIN — CLOPIDOGREL BISULFATE 75 MG: 75 TABLET ORAL at 08:06

## 2024-06-07 RX ADMIN — ASPIRIN 81 MG CHEWABLE TABLET 81 MG: 81 TABLET CHEWABLE at 08:06

## 2024-06-07 RX ADMIN — BENZONATATE 100 MG: 100 CAPSULE ORAL at 09:06

## 2024-06-07 RX ADMIN — IPRATROPIUM BROMIDE AND ALBUTEROL SULFATE 3 ML: 2.5; .5 SOLUTION RESPIRATORY (INHALATION) at 07:06

## 2024-06-07 RX ADMIN — BENZONATATE 100 MG: 100 CAPSULE ORAL at 10:06

## 2024-06-07 RX ADMIN — NITROGLYCERIN 20 MCG/MIN: 20 INJECTION INTRAVENOUS at 04:06

## 2024-06-07 NOTE — ED NOTES
Patient identifiers for Haroldo Dickinson 48 y.o. male checked and correct.  Chief Complaint   Patient presents with    Chest Pain     Left sided CP since 9pm. Reports pain was relieved with SL nitro. Pt received nitro and 324mg ASA with EMS. Hx of kidney transplant     Past Medical History:   Diagnosis Date    CAD (coronary artery disease), native coronary artery 11/21/2019    Cataract     Diabetes mellitus     Diabetic retinopathy     Dialysis patient     DM type 2 causing renal disease, not at goal     ESRD (end stage renal disease) started dialysis 01/2014 06/05/2014    History of colon polyps 02/10/2021    History of COVID-19 12/29/2022    Hyperparathyroidism, secondary renal 06/05/2014    Hypertension     NSTEMI (non-ST elevated myocardial infarction) 12/21/2013    Organ transplant candidate 06/05/2014    Pneumonia     Post PTCA 08/26/2020    RCA [facility] 7-25-20    Renal cell carcinoma of right kidney     Renal hypertension     Stroke      Allergies reported:   Review of patient's allergies indicates:   Allergen Reactions    No known allergies      Assumed care for pt after recieving report from nightshift RN. Pt. resting in bed in NAD, RR e/u. Vital signs stable and within desired limits at this time of assessment. Pt. offered bathroom assistance and denies need at this time, pt is anuric. Explanation of care/wait provided. Pt verbalizes no needs at this time. Bed in low, locked position with rails up and call bell in reach. Pt's white board updated with today's care team and plan.     LOC: Patient is awake, alert, and aware of environment with an appropriate affect. Patient is oriented x 4 and speaking appropriately.  APPEARANCE: Patient resting comfortably and in no acute distress. Patient is clean and well groomed, patient's clothing is properly fastened.  HEENT: WDL  SKIN: The skin is warm and dry. Patient has normal skin turgor and moist mucus membranes.   MUSCULOSKELETAL: Patient is moving all extremities  well, no obvious deformities noted. Pulses intact.   RESPIRATORY: Airway is open and patent. Respirations are spontaneous and non-labored with normal effort and rate. Pt on 2L nasal canula.   CARDIAC: Patient has a normal rate and rhythm. 90 on cardiac monitor. No peripheral edema noted. Denies chest pain and SOB at at time of assessment.   ABDOMEN: No distention noted. Soft and non-tender upon palpation.  NEUROLOGICAL: pupils 3mm, PERRL. Facial expression is symmetrical. Hand grasps are equal bilaterally. Normal sensation in all extremities when touched with finger.

## 2024-06-07 NOTE — ED PROVIDER NOTES
Encounter Date: 6/7/2024       History     Chief Complaint   Patient presents with    Chest Pain     Left sided CP since 9pm. Reports pain was relieved with SL nitro. Pt received nitro and 324mg ASA with EMS. Hx of kidney transplant     48-year-old male with history of CAD stents, diabetes, hypertension, ESRD on dialysis, presents to the ED for chest pain.  Patient reports to have acute onset chest pain around 9:00 p.m. last night while sitting on a chair. Patient took 0.4 mg SL nitro which some relief, however, the pain returned which prompted him to call 911. Per EMS, they gave him 325 aspirin and 2 more sprays of nitro with good response, vital signs stable enroute. Patient reports pain similar to his previous heart attack, no radiation, no known aggravating or alleviating factors, a/w shortness of breath and nausea. Denies fever, chill, cough, abdominal pain. Last dialysis was yesterday.    Chart review, patient was admitted to hospital for NSTEMI, marked troponin rise to 24 and 25. IC consulted: not a PCI candidate, and per Dr. Fu, also not a CABG candidate. Discussed prognosis with pt, who is not interested in palliative care/hospice at this time. Optimizing medical management per IC. Pt completed 48h of heparin on 3/13/2024 and was deemed appropriate for discharge.        Review of patient's allergies indicates:   Allergen Reactions    No known allergies      Past Medical History:   Diagnosis Date    CAD (coronary artery disease), native coronary artery 11/21/2019    Cataract     Diabetes mellitus     Diabetic retinopathy     Dialysis patient     DM type 2 causing renal disease, not at goal     ESRD (end stage renal disease) started dialysis 01/2014 06/05/2014    History of colon polyps 02/10/2021    History of COVID-19 12/29/2022    Hyperparathyroidism, secondary renal 06/05/2014    Hypertension     NSTEMI (non-ST elevated myocardial infarction) 12/21/2013    Organ transplant candidate 06/05/2014     Pneumonia     Post PTCA 08/26/2020    RCA HILLARY 7-25-20    Renal cell carcinoma of right kidney     Renal hypertension     Stroke      Past Surgical History:   Procedure Laterality Date    ANGIOGRAM, CORONARY, WITH LEFT HEART CATHETERIZATION N/A 7/1/2021    Procedure: Angiogram, Coronary, with Left Heart Cath;  Surgeon: Miki Zendejas MD;  Location: [facility] CATH LAB;  Service: Cardiology;  Laterality: N/A;    ANGIOGRAM, CORONARY, WITH LEFT HEART CATHETERIZATION N/A 11/28/2023    Procedure: Angiogram, Coronary, with Left Heart Cath;  Surgeon: Noah Pendleton MD;  Location: [facility] CATH LAB;  Service: Cardiology;  Laterality: N/A;    COLONOSCOPY N/A 5/3/2017    Procedure: COLONOSCOPY;  Surgeon: Rufino Carpenter MD;  [facility]  Service: Endoscopy;  Laterality: N/A;  pt states can only schedule on Wednesdays    COLONOSCOPY N/A 2/9/2021    Procedure: COLONOSCOPY;  Surgeon: Edil Wong MD;  [facility]  Service: Endoscopy;  Laterality: N/A;  Dialysis MWF/ labwork day of procedure  right arm aceess  per Dr. Colin pt can hold Plavix 5 days prior see note- sm  COVID test on 2/6/21 at [facility] - sm    COLONOSCOPY N/A 2/10/2021    Procedure: COLONOSCOPY;  Surgeon: Robert Ayers MD;  Location: [facility] (4TH FLR);  Service: Endoscopy;  Laterality: N/A;  rescheduled due to poor bowel prep-BB  negative covid screening 2/6/21-BB  dialysis M-W-F-BB  okay to r/s for 2/9/21 and to hold Plavix per Dr. KIRAN Wong-BB  labs same day-BB    CORONARY STENT PLACEMENT N/A 7/25/2019    Procedure: INSERTION, STENT, CORONARY ARTERY;  Surgeon: Miki Zendejas MD;  Location: [facility] CATH LAB;  Service: Cardiology;  Laterality: N/A;    DIALYSIS FISTULA CREATION      FISTULOGRAM N/A 2/18/2019    Procedure: Fistulogram;  Surgeon: Yaneth De La Cruz MD;  Location: [facility] CATH LAB;  Service: Cardiology;  Laterality: N/A;    FISTULOGRAM Right 7/23/2019    Procedure: Fistulogram;  Surgeon: Oz Cordoba MD;   Location: [facility] CATH LAB;  Service: Cardiology;  Laterality: Right;    LAPAROSCOPIC ROBOT-ASSISTED SURGICAL REMOVAL OF KIDNEY USING DA JAIME XI Right 5/19/2022    Procedure: XI ROBOTIC NEPHRECTOMY;  Surgeon: Aidan Hendricks MD;  [facility];  Service: Urology;  Laterality: Right;  3hrs    LAPAROSCOPIC ROBOT-ASSISTED SURGICAL REMOVAL OF KIDNEY USING DA JAIME XI Left 1/5/2023    Procedure: XI ROBOTIC NEPHRECTOMY;  Surgeon: Aidan Hendricks MD;  [facility];  Service: Urology;  Laterality: Left;  4 hrs    LEFT HEART CATHETERIZATION Left 7/25/2019    Procedure: Left heart cath;  Surgeon: Miki Zendejas MD;  Location: [facility] CATH LAB;  Service: Cardiology;  Laterality: Left;    REPAIR  1/5/2023    Procedure: REPAIR; COLOTOMY;  Surgeon: Maikel Lamb MD;  [address];  Service: General;;    RETINAL LASER PROCEDURE Bilateral 2018 or 2017    Dr. Rothman    VASCULAR SURGERY       Family History   Problem Relation Name Age of Onset    Hypertension Mother      Diabetes Mother      Cataracts Mother      Hypertension Father      Hypertension Sister      Kidney disease Brother      Hypertension Brother      Heart disease Brother      Cancer Maternal Grandfather          Colon CA    Colon cancer Neg Hx      Esophageal cancer Neg Hx      Stomach cancer Neg Hx      Rectal cancer Neg Hx      Ulcerative colitis Neg Hx      Irritable bowel syndrome Neg Hx      Crohn's disease Neg Hx      Celiac disease Neg Hx      Glaucoma Neg Hx      Macular degeneration Neg Hx       Social History     Tobacco Use    Smoking status: Never    Smokeless tobacco: Never   Substance Use Topics    Alcohol use: Not Currently     Comment: One drink a month    Drug use: No     Review of Systems    Physical Exam     Initial Vitals [06/07/24 0315]   BP Pulse Resp Temp SpO2   (!) 180/100 102 18 97.8 °F (36.6 °C) 99 %      MAP       --         Physical Exam    Nursing note and vitals reviewed.  Constitutional: He  appears well-developed. No distress.   HENT:   Head: Normocephalic and atraumatic.   Nose: Nose normal.   Eyes: Conjunctivae and EOM are normal. Pupils are equal, round, and reactive to light.   Neck: No JVD present.   Normal range of motion.  Cardiovascular:  Normal rate, regular rhythm and normal heart sounds.           AV fistular right forearm with palpable thrill    Pulmonary/Chest: Breath sounds normal. No respiratory distress.   Abdominal: Abdomen is soft. He exhibits no distension. There is no abdominal tenderness.   Musculoskeletal:         General: No tenderness or edema. Normal range of motion.      Cervical back: Normal range of motion.     Neurological: He is alert and oriented to person, place, and time. He has normal strength. No cranial nerve deficit.   Skin: Skin is warm and dry. Capillary refill takes less than 2 seconds.         ED Course   Procedures  Labs Reviewed   CBC W/ AUTO DIFFERENTIAL - Abnormal; Notable for the following components:       Result Value    RBC 3.32 (*)     Hemoglobin 9.7 (*)     Hematocrit 31.1 (*)     MCHC 31.2 (*)     RDW 15.2 (*)     Gran # (ANC) 8.9 (*)     Immature Grans (Abs) 0.05 (*)     Mono # 1.3 (*)     Lymph % 11.3 (*)     All other components within normal limits   COMPREHENSIVE METABOLIC PANEL - Abnormal; Notable for the following components:    BUN 41 (*)     Creatinine 9.4 (*)     Calcium 10.6 (*)     Albumin 3.0 (*)     eGFR 6.4 (*)     All other components within normal limits   TROPONIN I - Abnormal; Notable for the following components:    Troponin I 0.161 (*)     All other components within normal limits   TROPONIN I - Abnormal; Notable for the following components:    Troponin I 0.312 (*)     All other components within normal limits   B-TYPE NATRIURETIC PEPTIDE - Abnormal; Notable for the following components:    BNP 1,542 (*)     All other components within normal limits   D DIMER, QUANTITATIVE - Abnormal; Notable for the following components:     D-Dimer 4.14 (*)     All other components within normal limits   ISTAT PROCEDURE - Abnormal; Notable for the following components:    POC PCO2 46.6 (*)     POC HCO3 28.4 (*)     POC BE 3 (*)     POC TCO2 30 (*)     All other components within normal limits   D DIMER, QUANTITATIVE   APTT   PROTIME-INR   CBC W/ AUTO DIFFERENTIAL     EKG Readings: (Independently Interpreted)   Initial Reading: No STEMI. Previous EKG: Compared with most recent EKG Rhythm: Sinus Tachycardia. Heart Rate: 110. Ectopy: No Ectopy. Conduction: Normal. ST Segments: Normal ST Segments. T Waves: Normal. Axis: Normal. Clinical Impression: Sinus Tachycardia       Imaging Results              CTA Chest Non-Coronary (PE Studies) (Final result)  Result time 06/07/24 07:04:06      Final result by Shilo Flores MD (06/07/24 07:04:06)                   Impression:      No evidence of acute pulmonary embolus to the proximal segmental level.    Cardiomegaly with bilateral ground-glass and airspace opacities suggestive of pulmonary edema.  Correlation with clinical findings will be needed to exclude superimposed aspiration or pneumonia in the lower lobes and inferior left upper lobe.    Multiple small bilateral ground-glass nodular opacities which could be infectious/inflammatory.  For multiple sub solid nodules with any ?6 mm, Fleischner Society 2017 guidelines recommend short term follow up non-contrast chest CT in 3-6 months, as these may be secondary to infectious etiology. If these findings persist at that time, subsequent management should be based on the most suspicious nodule.    Atherosclerosis and coronary artery calcifications.    Probable small volume ascites in the upper abdomen.      Electronically signed by: Shilo Flores MD  Date:    06/07/2024  Time:    07:04               Narrative:    EXAMINATION:  CTA CHEST NON CORONARY (PE STUDIES)    CLINICAL HISTORY:  Pulmonary embolism (PE) suspected, positive D-dimer;    TECHNIQUE:  Low  dose axial images, sagittal and coronal reformations were obtained from the thoracic inlet to the lung bases following the IV administration of 75 mL of Omnipaque 350.  Contrast timing was optimized to evaluate the pulmonary arteries.  MIP images were performed.    COMPARISON:  CT abdomen pelvis, 01/14/2023.    FINDINGS:  Examination of the soft tissue and vascular structures at the base of the neck is unremarkable.    The thoracic aorta maintains normal caliber, contour, and course with moderate atherosclerotic calcification.  There is no evidence of aneurysmal dilation or dissection.    The pulmonary arteries distribute normally without evidence of filling defect to indicate pulmonary thromboembolism.  Exam quality is slightly limited by motion and suboptimal bolus timing.    The trachea and proximal airways are patent.    The lungs are symmetrically expanded and there are diffuse bilateral ground-glass opacities with interlobular septal thickening.  Patchy airspace and consolidative opacities in the bilateral lower lobes, right middle lobe, and inferior aspect of the left upper lobe.  Scattered small ground-glass nodules measuring up to 7 mm in size (for example, series 3, image 196).  Small bilateral pleural effusions, right side greater than left.  Detailed evaluation of the pulmonary parenchyma limited by motion.    The heart is enlarged.  Trace pericardial fluid.  Coronary artery calcifications.  No abnormal bowing of the interventricular septum.    Borderline prominent mediastinal lymph nodes.  Prevascular mediastinal lymph node measuring 1.2 cm in short axis (series 2, image 192).  Suspect prominent subcarinal lymph nodes, though difficult to differentiate from the esophagus.    The esophagus demonstrates no acute abnormality.    Limited images of the upper abdomen obtained during the course of this dedicated thoracic CT is negative for acute findings.  Small volume ascites partially imaged in the  incompletely imaged upper abdomen.    The osseous structures are negative for acute finding or aggressive osseous lesion.  Mild sclerotic changes likely related to renal osteodystrophy.                                       X-Ray Chest AP Portable (Final result)  Result time 06/07/24 05:39:42      Final result by Moses Gotti MD (06/07/24 05:39:42)                   Impression:      Cardiomegaly with bilateral edema.  Small left effusion suggested.      Electronically signed by: Moses Gotti MD  Date:    06/07/2024  Time:    05:39               Narrative:    EXAMINATION:  XR CHEST AP PORTABLE    CLINICAL HISTORY:  Chest Pain;    TECHNIQUE:  Single frontal view of the chest was performed.    COMPARISON:  None    FINDINGS:  No consolidation, right pleural effusion or pneumothorax.    Cardiomegaly with bilateral edema.  Small left effusion suggested.                                       Medications   nitroGLYCERIN in 5 % dextrose 50 mg/250 mL (200 mcg/mL) infusion (120 mcg/min Intravenous Verify Only 6/7/24 0700)   heparin 25,000 units in dextrose 5% (100 units/ml) IV bolus from bag LOW INTENSITY nomogram - [facility] (has no administration in time range)   heparin 25,000 units in dextrose 5% 250 mL (100 units/mL) infusion LOW INTENSITY nomogram - [facility] (has no administration in time range)   heparin 25,000 units in dextrose 5% (100 units/ml) IV bolus from bag LOW INTENSITY nomogram - [facility] (has no administration in time range)   heparin 25,000 units in dextrose 5% (100 units/ml) IV bolus from bag LOW INTENSITY nomogram - [facility] (has no administration in time range)   nitroGLYCERIN SL tablet 0.4 mg (0.4 mg Sublingual Given 6/7/24 0335)   ondansetron injection 4 mg (4 mg Intravenous Given 6/7/24 0335)   HYDROmorphone injection 0.5 mg (0.5 mg Intravenous Given 6/7/24 0528)   iohexoL (OMNIPAQUE 350) injection 75 mL (75 mLs Intravenous Given 6/7/24 0602)     Medical Decision Making  48-year-old male with history of CAD  stents, diabetes, hypertension, ESRD on dialysis, presents to the ED for chest pain that relieved with nitro.  Patient continued to have chest pain refractory to multiple sublingual nitro, found to be hypoxic on room air, hypertensive, concerning for fluid overload, secondary to renal failure versus cardiac failure. ECG without ST-elevation or depression.  Started nitro drip for pain control.  Differential including but not limited to heart failure exacerbation, hypertensive emergency, unstable angina, pneumonia, doubt PTX    Amount and/or Complexity of Data Reviewed  Labs: ordered. Decision-making details documented in ED Course.  Radiology: ordered and independent interpretation performed. Decision-making details documented in ED Course.  ECG/medicine tests: ordered and independent interpretation performed. Decision-making details documented in ED Course.    Risk  Prescription drug management.  Decision regarding hospitalization.               ED Course as of 06/08/24 0817 Fri Jun 07, 2024   0755 CBC auto differential(!)  No leukocytosis, no acute anemia [NC]   0755 Comprehensive metabolic panel(!)  No electrolyte derangement, renal function at baseline [NC]   0756 BNP(!): 1,542  Concerning for fluid overload [NC]   0756 D-Dimer(!): 4.14 [NC]   0756 Troponin I(!): 0.312 [NC]   0756 CTA Chest Non-Coronary (PE Studies)  No PE, significant for will effusion, concerning for congestive heart failure [NC]   0756 Discussed the case with Cardiology, concerning for unstable angina requiring nitro drip. [NC]   0757 Sign-out to oncoming team, pending Cardiology recommendation. [NC]   0802 On my re-evaluation of the patient, he endorses that he was not had any further chest pain since being started on the nitro drip.  He endorses chronic dry cough for the last several days, intermittent shortness of breath when his chest pain episodes were happening.  I discussed the NSTEMI findings with his elevated troponin, and we  initiated him on ACS protocol. [KB]      ED Course User Index  [KB] Martha Galvan MD  [NC] Clement Arredondo MD                           Clinical Impression:  Final diagnoses:  [R07.9] Chest pain                 Clement Arredondo MD  Resident  06/07/24 0758       Clement Arredondo MD  Resident  06/08/24 0818

## 2024-06-07 NOTE — ED NOTES
Pt currently stating mid 80s on 3 Ls via NC. Pt increased to 5 L via NC. Now stating at high 80s. Care on going.

## 2024-06-07 NOTE — NURSING
3.5hours dialysis completed  , HD  done in ED, bedside . 2L removed . Pressure held for 5minutes .Hemostasis achieved  , covered  with tape and guaze .Patient  tolerated well .Report given to PORTIA Suárez.

## 2024-06-07 NOTE — PROVIDER PROGRESS NOTES - EMERGENCY DEPT.
Encounter Date: 6/7/2024    ED Physician Progress Notes          ED Resident HAND-OFF NOTE:  7:16 AM 6/7/2024  Haroldo Dickinson is a 48 y.o. male who presented to the ED on 6/7/2024 and has been managed by Dr. Manzo, who reports patient C/O chest pain. I assumed care of patient from off-going ED physician team at 7:16 AM pending Cards consult.       48 M Hx of ESRD ACS CAD, HFpEF pw right sided CP, responsive to nitro.  On nitro drip, troponin elevated but improved from previous.  Appears elevated BNP/ concern for CHF exacerbation. On nitro drip for pain.   [x ] Cards consult    Started patient on ACS heparin nomogram given increase in troponin.  Following administration of antihypertensive medications, is nitro drip was turned off and he remained asymptomatic without any chest pain.  He was admitted to Hospital Medicine for further management of NSTEMI.   .    On my evaluation, Haroldo Dickinson appears well, hemodynamically stable and in NAD. Thus far, Haroldo Dickinson has received:  Medications   nitroGLYCERIN in 5 % dextrose 50 mg/250 mL (200 mcg/mL) infusion (0 mcg/min Intravenous Paused 6/7/24 0857)   heparin 25,000 units in dextrose 5% 250 mL (100 units/mL) infusion LOW INTENSITY nomogram - [facility] (12 Units/kg/hr × 68.7 kg (Adjusted) Intravenous New Bag 6/7/24 0847)   heparin 25,000 units in dextrose 5% (100 units/ml) IV bolus from bag LOW INTENSITY nomogram - [facility] (has no administration in time range)   heparin 25,000 units in dextrose 5% (100 units/ml) IV bolus from bag LOW INTENSITY nomogram - [facility] (has no administration in time range)   aspirin chewable tablet 81 mg (81 mg Oral Given 6/7/24 0840)   clopidogreL tablet 75 mg (75 mg Oral Given 6/7/24 0840)   carvediloL tablet 50 mg (50 mg Oral Given 6/7/24 0840)   NIFEdipine 24 hr tablet 90 mg (90 mg Oral Given 6/7/24 0839)   isosorbide mononitrate 24 hr tablet 120 mg (120 mg Oral Given 6/7/24 0840)   benzonatate capsule 100 mg (100 mg Oral Given 6/7/24 0914)    albuterol-ipratropium 2.5 mg-0.5 mg/3 mL nebulizer solution 3 mL (has no administration in time range)   melatonin tablet 6 mg (has no administration in time range)   ondansetron disintegrating tablet 8 mg (has no administration in time range)   promethazine tablet 25 mg (has no administration in time range)   polyethylene glycol packet 17 g (has no administration in time range)   bisacodyL suppository 10 mg (has no administration in time range)   acetaminophen tablet 650 mg (has no administration in time range)   glucose chewable tablet 16 g (has no administration in time range)   glucose chewable tablet 24 g (has no administration in time range)   glucagon (human recombinant) injection 1 mg (has no administration in time range)   dextrose 10% bolus 125 mL 125 mL (has no administration in time range)   dextrose 10% bolus 250 mL 250 mL (has no administration in time range)   nitroGLYCERIN SL tablet 0.4 mg (0.4 mg Sublingual Given 6/7/24 0335)   ondansetron injection 4 mg (4 mg Intravenous Given 6/7/24 0335)   HYDROmorphone injection 0.5 mg (0.5 mg Intravenous Given 6/7/24 0528)   iohexoL (OMNIPAQUE 350) injection 75 mL (75 mLs Intravenous Given 6/7/24 0602)   heparin 25,000 units in dextrose 5% (100 units/ml) IV bolus from bag LOW INTENSITY nomogram - [facility] (3,990 Units Intravenous Bolus from Bag 6/7/24 0847)       On my exam, I appreciate:  BP (!) 162/80 (BP Location: Left arm, Patient Position: Lying)   Pulse 91   Temp 98 °F (36.7 °C) (Oral)   Resp (!) 22   Wt 72.6 kg (160 lb)   SpO2 95%   BMI 25.06 kg/m²   ED Course as of 06/07/24 0938   Fri Jun 07, 2024   0755 CBC auto differential(!)  No leukocytosis, no acute anemia [NC]   0755 Comprehensive metabolic panel(!)  No electrolyte derangement, renal function at baseline [NC]   0756 BNP(!): 1,542  Concerning for fluid overload [NC]   0756 D-Dimer(!): 4.14 [NC]   0756 Troponin I(!): 0.312 [NC]   0756 CTA Chest Non-Coronary (PE Studies)  No PE, significant for  will effusion, concerning for congestive heart failure [NC]   0756 Discussed the case with Cardiology, concerning for unstable angina requiring nitro drip. [NC]   0757 Sign-out to oncoming team, pending Cardiology recommendation. [NC]   0802 On my re-evaluation of the patient, he endorses that he was not had any further chest pain since being started on the nitro drip.  He endorses chronic dry cough for the last several days, intermittent shortness of breath when his chest pain episodes were happening.  I discussed the NSTEMI findings with his elevated troponin, and we initiated him on ACS protocol. [KB]      ED Course User Index  [KB] Martha Galvan MD  [NC] Clement Arredondo MD         Disposition:Admit to  for NSTEMI   I have discussed and counseled Haroldo Dickinson regarding exam, results, diagnosis, treatment, and plan.  ______________________  Martha Galvan DO  Emergency Medicine Resident  7:16 AM 6/7/2024

## 2024-06-07 NOTE — HPI
"Haroldo Dickinson is a 48 y.o. male w/ PMHx of CAD (s/p HILLARY to RCA in 2020), HTN, HFpEF, ESRD on dialysis, hx of RCC (s/p bilateral nephrectomies), T2DM, and hx of CVA (w/ residual R-sided deficits), who presented to [facility] ED on 6/7/24 via EMS for evaluation of chest pain. Pt reports sudden onset L-sided chest discomfort while at rest (sitting in chair) the night before. Describes sensation of his heart "beating out of [his] chest." Reports some relief w/ SL NTG, but w/ recurrence shortly afterwards prompting pt to call EMS. Pt also endorses LUE pain for past few weeks, as well as chest congestion w/ nonproductive cough. Reports chest pain is exacerbated w/ recent coughing. Denies fevers, chills, lightheadedness, LE swelling, SOB, headache, vision changes, N/V, or syncope.     Of note, pt has known severe CAD. Had HILLARY placed to RCA in 2020. More recently w/ [facility] in Nov 2023 noting 2-vessel dx, including 90% stenosis of LAD &  of RCA. Has had multiple presentations/admissions for chest pain and NSTEMI/elevated troponin. Per chart review, cardiology has noted that pt's anatomy is not amenable to revascularization, and has advised continued medical mgmt. Reports compliance w/ home meds & dialysis. Last run 6/5 as normal (however 6/3 run apparently didn't pull fluid).     On arrival to ED, pt afebrile, hypertensive (SBP 160s-180s), mildly tachycardic (HR 90s-100s), & mildly tachypneic (RR 18-22) w/ SpO2 88% on RA (placed on 2L NC w/ improvement). Labs notable for D-dimer 4.14, BNP 1542, troponin 0.161, WBC 12.2, Hgb 9.7, Cr 9.4, & BUN 41. CTA negative for PE but w/ b/l ground-glass airspace opacities s/o pulmonary edema w/ possible superimposed PNA. Received SL NTG 0.4mg, IV dilaudid 0.5mg, & IV Zofran 4mg. Repeat troponin uptrending to 0.312. Pt w/o pain. Started on heparin & NTG ggts. EKG w/ nonspecific ST/T wave changes. Reviewed by cardiology w/ low suspicion for ACS etiology. Received home Imdur 120mg, nifedipine " 90mg, carvedilol 50mg, ASA 81mg, Plavix 75mg, & atorvastatin 80mg. NTG ggt d/c'ed. Also started on ceftriaxone & azithromycin. Pt admitted to Hospital Medicine service for further workup & mgmt of AHRF 2/2 CHF exacerbation w/ possible PNA.

## 2024-06-07 NOTE — CONSULTS
Nephrology Consult Note    REASON FOR CONSULT/CHIEF COMPLAIN: ESRD on iHD    REFERRING PHYSICIAN: Kassi Joseph    HISTORY OF PRESENT ILLNESS:   Haroldo Dickinson is a 47 yo male with CAD, CVA, RCC s/p bilateral nephrectomy, HTN, and ESRD on iHD MWF who presents to the hospital for CP evaluation. Nephrology has been consulted for ESRD management while IP.     Patient reports that his chest pain has been resolved since he came to the hospital    ROS:  General: negative for chills, or fatigue  Respiratory: No cough, shortness of breath, or wheezing  Cardiovascular: No chest pain or dyspnea   Gastrointestinal: No abdominal pain, change in bowel habits      MEDICAL PROBLEMS:  Past Medical History:   Diagnosis Date    CAD (coronary artery disease), native coronary artery 11/21/2019    Cataract     Diabetes mellitus     Diabetic retinopathy     Dialysis patient     DM type 2 causing renal disease, not at goal     ESRD (end stage renal disease) started dialysis 01/2014 06/05/2014    History of colon polyps 02/10/2021    History of COVID-19 12/29/2022    Hyperparathyroidism, secondary renal 06/05/2014    Hypertension     NSTEMI (non-ST elevated myocardial infarction) 12/21/2013    Organ transplant candidate 06/05/2014    Pneumonia     Post PTCA 08/26/2020    RCA [facility] 7-25-20    Renal cell carcinoma of right kidney     Renal hypertension     Stroke        SURGICAL PROBLEMS:  Past Surgical History:   Procedure Laterality Date    ANGIOGRAM, CORONARY, WITH LEFT HEART CATHETERIZATION N/A 7/1/2021    Procedure: Angiogram, Coronary, with Left Heart Cath;  Surgeon: Miki Zendejas MD;  Location: [facility] CATH LAB;  Service: Cardiology;  Laterality: N/A;    ANGIOGRAM, CORONARY, WITH LEFT HEART CATHETERIZATION N/A 11/28/2023    Procedure: Angiogram, Coronary, with Left Heart Cath;  Surgeon: Noah Pendleton MD;  Location: [facility] CATH LAB;  Service: Cardiology;  Laterality: N/A;    COLONOSCOPY N/A 5/3/2017    Procedure: COLONOSCOPY;   Surgeon: Rufino Carpenter MD;  Location: [facility] ENDO (4TH FLR);  Service: Endoscopy;  Laterality: N/A;  pt states can only schedule on Wednesdays    COLONOSCOPY N/A 2/9/2021    Procedure: COLONOSCOPY;  Surgeon: Edil Wong MD;  Location: [facility] (4TH FLR);  Service: Endoscopy;  Laterality: N/A;  Dialysis MWF/ labwork day of procedure  right arm aceess  per Dr. Colin pt can hold Plavix 5 days prior see note- sm  COVID test on 2/6/21 at [facility] -     COLONOSCOPY N/A 2/10/2021    Procedure: COLONOSCOPY;  Surgeon: Robert Ayers MD;  Location: [facility] (4TH FLR);  Service: Endoscopy;  Laterality: N/A;  rescheduled due to poor bowel prep-BB  negative covid screening 2/6/21-BB  dialysis M-W-F-BB  okay to r/s for 2/9/21 and to hold Plavix per Dr. KIRAN Wong-BB  labs same day-BB    CORONARY STENT PLACEMENT N/A 7/25/2019    Procedure: INSERTION, STENT, CORONARY ARTERY;  Surgeon: Miki Zendejas MD;  Location: [facility] CATH LAB;  Service: Cardiology;  Laterality: N/A;    DIALYSIS FISTULA CREATION      FISTULOGRAM N/A 2/18/2019    Procedure: Fistulogram;  Surgeon: Yaneth De La Cruz MD;  Location: [facility] CATH LAB;  Service: Cardiology;  Laterality: N/A;    FISTULOGRAM Right 7/23/2019    Procedure: Fistulogram;  Surgeon: Oz Cordoba MD;  Location: [facility] CATH LAB;  Service: Cardiology;  Laterality: Right;    LAPAROSCOPIC ROBOT-ASSISTED SURGICAL REMOVAL OF KIDNEY USING DA JAMIE XI Right 5/19/2022    Procedure: XI ROBOTIC NEPHRECTOMY;  Surgeon: Aidan Hendricks MD;  Location: [address];  Service: Urology;  Laterality: Right;  3hrs    LAPAROSCOPIC ROBOT-ASSISTED SURGICAL REMOVAL OF KIDNEY USING DA JAIME XI Left 1/5/2023    Procedure: XI ROBOTIC NEPHRECTOMY;  Surgeon: Aidan Hendricks MD;  Location: [address];  Service: Urology;  Laterality: Left;  4 hrs    LEFT HEART CATHETERIZATION Left 7/25/2019    Procedure: Left heart cath;  Surgeon: Miki Zendejas MD;  Location: [facility] CATH LAB;  Service: Cardiology;   Laterality: Left;    REPAIR  1/5/2023    Procedure: REPAIR; COLOTOMY;  Surgeon: Maikel Lamb MD;  Location: [facility] OR [address] [place] [address];  Service: General;;    RETINAL LASER PROCEDURE Bilateral 2018 or 2017    Dr. Rothman    VASCULAR SURGERY         FAMILY HISTORY:   Family History   Problem Relation Name Age of Onset    Hypertension Mother      Diabetes Mother      Cataracts Mother      Hypertension Father      Hypertension Sister      Kidney disease Brother      Hypertension Brother      Heart disease Brother      Cancer Maternal Grandfather          Colon CA    Colon cancer Neg Hx      Esophageal cancer Neg Hx      Stomach cancer Neg Hx      Rectal cancer Neg Hx      Ulcerative colitis Neg Hx      Irritable bowel syndrome Neg Hx      Crohn's disease Neg Hx      Celiac disease Neg Hx      Glaucoma Neg Hx      Macular degeneration Neg Hx         SOCIAL HISTORY:  Social History     Socioeconomic History    Marital status: Single   Occupational History     Employer: [employer]   Tobacco Use    Smoking status: Never    Smokeless tobacco: Never   Substance and Sexual Activity    Alcohol use: Not Currently     Comment: One drink a month    Drug use: No    Sexual activity: Yes     Partners: Female     Birth control/protection: None   Social History Narrative    Disabled    Was     One son          Social Determinants of Health     Financial Resource Strain: Low Risk  (3/11/2024)    Overall Financial Resource Strain (CARDIA)     Difficulty of Paying Living Expenses: Not hard at all   Food Insecurity: Patient Declined (3/11/2024)    Hunger Vital Sign     Worried About Running Out of Food in the Last Year: Patient declined     Ran Out of Food in the Last Year: Patient declined   Transportation Needs: No Transportation Needs (3/11/2024)    PRAPARE - Transportation     Lack of Transportation (Medical): No     Lack of Transportation (Non-Medical): No   Physical Activity: Inactive (3/11/2024)    Exercise Vital Sign     Days  of Exercise per Week: 0 days     Minutes of Exercise per Session: 0 min   Stress: Patient Declined (3/11/2024)    [employer] [place] [employer] - Occupational Stress Questionnaire     Feeling of Stress : Patient declined   Housing Stability: Low Risk  (3/11/2024)    Housing Stability Vital Sign     Unable to Pay for Housing in the Last Year: No     Number of Places Lived in the Last Year: 1     Unstable Housing in the Last Year: No       ALLERGIES:  Review of patient's allergies indicates:   Allergen Reactions    No known allergies        MEDICATIONS:    Current Facility-Administered Medications:     0.9%  NaCl infusion, , Intravenous, Once, Gianna Watkins, GISSELL, Last Rate: 100 mL/hr at 06/07/24 1439, New Bag at 06/07/24 1439    acetaminophen tablet 650 mg, 650 mg, Oral, Q4H PRN, Taniya Carnes PA-C    albuterol-ipratropium 2.5 mg-0.5 mg/3 mL nebulizer solution 3 mL, 3 mL, Nebulization, Q4H PRN, Taniya Carnes PA-C    aspirin chewable tablet 81 mg, 81 mg, Oral, Daily, Alfredo Andrews MD, 81 mg at 06/07/24 0840    atorvastatin tablet 80 mg, 80 mg, Oral, Daily, Taniya Carnes PA-C, 80 mg at 06/07/24 1208    [COMPLETED] azithromycin tablet 500 mg, 500 mg, Oral, Once, 500 mg at 06/07/24 1208 **FOLLOWED BY** [START ON 6/8/2024] azithromycin tablet 250 mg, 250 mg, Oral, Daily, Taniya Carnes PA-C    benzonatate capsule 100 mg, 100 mg, Oral, TID PRN, Martha Galvan MD, 100 mg at 06/07/24 0914    bisacodyL suppository 10 mg, 10 mg, Rectal, Daily PRN, Taniya Carnes PA-C    carvediloL tablet 50 mg, 50 mg, Oral, BID, Alfredo Andrews MD, 50 mg at 06/07/24 0840    cefTRIAXone (Rocephin) 1 g in dextrose 5 % in water (D5W) 100 mL IVPB (MB+), 1 g, Intravenous, Q24H, Taniya Carnes PA-C, Stopped at 06/07/24 1241    clopidogreL tablet 75 mg, 75 mg, Oral, Daily, Alfredo Andrews MD, 75 mg at 06/07/24 0840    dextrose 10% bolus 125 mL 125 mL, 12.5 g, Intravenous, PRN, Trice,  SAFIA Ledesma-JOSSELINE    dextrose 10% bolus 250 mL 250 mL, 25 g, Intravenous, PRN, Taniya Carnes PA-JOSSELINE    ezetimibe tablet 10 mg, 10 mg, Oral, Daily, Taniya Carnes PA-C, 10 mg at 06/07/24 1258    glucagon (human recombinant) injection 1 mg, 1 mg, Intramuscular, PRN, Taniya Carnes PA-JOSSELINE    glucose chewable tablet 16 g, 16 g, Oral, PRN, Taniya Carnes PA-C    glucose chewable tablet 24 g, 24 g, Oral, PRN, Taniya Carnes PA-JOSSELINE    heparin 25,000 units in dextrose 5% (100 units/ml) IV bolus from bag LOW INTENSITY nomogram - [facility], 58.2 Units/kg (Adjusted), Intravenous, PRN, Martha Galvan MD    heparin 25,000 units in dextrose 5% (100 units/ml) IV bolus from bag LOW INTENSITY nomogram - [facility], 30 Units/kg (Adjusted), Intravenous, PRN, Martha Galvan MD    heparin 25,000 units in dextrose 5% 250 mL (100 units/mL) infusion LOW INTENSITY nomogram - [facility], 0-40 Units/kg/hr (Adjusted), Intravenous, Continuous, Martha Galvan MD, Last Rate: 8.2 mL/hr at 06/07/24 0847, 12 Units/kg/hr at 06/07/24 0847    hydrALAZINE tablet 50 mg, 50 mg, Oral, Q12H, Taniya Carnes PA-JOSSELINE    isosorbide mononitrate 24 hr tablet 120 mg, 120 mg, Oral, Daily, Alfredo Andrews MD, 120 mg at 06/07/24 0840    melatonin tablet 6 mg, 6 mg, Oral, Nightly PRN, Taniya Carnes PA-C    NIFEdipine 24 hr tablet 90 mg, 90 mg, Oral, Daily, Alfredo Andrews MD, 90 mg at 06/07/24 0839    ondansetron disintegrating tablet 8 mg, 8 mg, Oral, Q8H PRN, Taniya Carnes PAABBE    polyethylene glycol packet 17 g, 17 g, Oral, Daily PRN, Taniya Carnes PA-C    promethazine tablet 25 mg, 25 mg, Oral, Q6H PRN, Taniya Carnes PA-C    sevelamer carbonate tablet 800 mg, 800 mg, Oral, TID WM, Taniya Carnes PA-C, 800 mg at 06/07/24 1258    Current Outpatient Medications:     aspirin (ECOTRIN) 81 MG EC tablet, Take 1 tablet (81 mg total) by mouth once daily., Disp: 30 tablet, Rfl: 11    atorvastatin (LIPITOR) 80 MG tablet,  Take 1 tablet (80 mg total) by mouth once daily., Disp: 90 tablet, Rfl: 3    carvediloL (COREG) 25 MG tablet, Take 2 tablets (50 mg total) by mouth 2 (two) times daily with meals., Disp: 360 tablet, Rfl: 3    ciclopirox (PENLAC) 8 % Soln, Apply topically nightly., Disp: 6.6 mL, Rfl: 3    ciclopirox (PENLAC) 8 % Soln, Apply topically nightly., Disp: 6.6 mL, Rfl: 3    cinacalcet (SENSIPAR) 30 MG Tab, Take by mouth., Disp: , Rfl:     clopidogreL (PLAVIX) 75 mg tablet, Take 1 tablet (75 mg total) by mouth once daily., Disp: 90 tablet, Rfl: 3    ezetimibe (ZETIA) 10 mg tablet, Take 1 tablet (10 mg total) by mouth once daily., Disp: 90 tablet, Rfl: 3    hydrALAZINE (APRESOLINE) 50 MG tablet, Take 1 tablet (50 mg total) by mouth every 12 (twelve) hours., Disp: 60 tablet, Rfl: 11    isosorbide mononitrate (IMDUR) 60 MG 24 hr tablet, Take 1 tablet (60 mg total) by mouth once daily., Disp: 90 tablet, Rfl: 0    NIFEdipine (PROCARDIA-XL) 90 MG (OSM) 24 hr tablet, Take 1 tablet (90 mg total) by mouth once daily., Disp: 90 tablet, Rfl: 0    nitroGLYCERIN (NITROSTAT) 0.4 MG SL tablet, Place 1 tablet (0.4 mg total) under the tongue every 5 (five) minutes as needed for Chest pain., Disp: 100 tablet, Rfl: 0    polyethylene glycol (GLYCOLAX) 17 gram/dose powder, Take 17 g by mouth daily as needed for Constipation., Disp: 238 g, Rfl: 0    sevelamer carbonate (RENVELA) 800 mg Tab, Take 800 mg by mouth 3 (three) times daily with meals., Disp: , Rfl:     VELPHORO 500 mg Chew, Take 2 tablets by mouth., Disp: , Rfl:     vit B complex-C-folic ac-zinc (RENAPLEX) 800 mcg- 12.5 mg Tab, Take 1 tablet by mouth., Disp: , Rfl:     Facility-Administered Medications Ordered in Other Encounters:     lidocaine (PF) 10 mg/ml (1%) injection 10 mg, 1 mL, Intradermal, Once, Michelle Spence MD   Medication List with Changes/Refills   Current Medications    ASPIRIN (ECOTRIN) 81 MG EC TABLET    Take 1 tablet (81 mg total) by mouth once daily.     "ATORVASTATIN (LIPITOR) 80 MG TABLET    Take 1 tablet (80 mg total) by mouth once daily.    CARVEDILOL (COREG) 25 MG TABLET    Take 2 tablets (50 mg total) by mouth 2 (two) times daily with meals.    CICLOPIROX (PENLAC) 8 % SOLN    Apply topically nightly.    CICLOPIROX (PENLAC) 8 % SOLN    Apply topically nightly.    CINACALCET (SENSIPAR) 30 MG TAB    Take by mouth.    CLOPIDOGREL (PLAVIX) 75 MG TABLET    Take 1 tablet (75 mg total) by mouth once daily.    EZETIMIBE (ZETIA) 10 MG TABLET    Take 1 tablet (10 mg total) by mouth once daily.    HYDRALAZINE (APRESOLINE) 50 MG TABLET    Take 1 tablet (50 mg total) by mouth every 12 (twelve) hours.    ISOSORBIDE MONONITRATE (IMDUR) 60 MG 24 HR TABLET    Take 1 tablet (60 mg total) by mouth once daily.    NIFEDIPINE (PROCARDIA-XL) 90 MG (OSM) 24 HR TABLET    Take 1 tablet (90 mg total) by mouth once daily.    NITROGLYCERIN (NITROSTAT) 0.4 MG SL TABLET    Place 1 tablet (0.4 mg total) under the tongue every 5 (five) minutes as needed for Chest pain.    POLYETHYLENE GLYCOL (GLYCOLAX) 17 GRAM/DOSE POWDER    Take 17 g by mouth daily as needed for Constipation.    SEVELAMER CARBONATE (RENVELA) 800 MG TAB    Take 800 mg by mouth 3 (three) times daily with meals.    VELPHORO 500 MG CHEW    Take 2 tablets by mouth.    VIT B COMPLEX-C-FOLIC AC-ZINC (RENAPLEX) 800 MCG- 12.5 MG TAB    Take 1 tablet by mouth.        PHYSICAL EXAM:  BP (!) 164/91   Pulse 78   Temp 98.1 °F (36.7 °C) (Oral)   Resp (!) 21   Ht 5' 7" (1.702 m)   Wt 76.2 kg (167 lb 15.9 oz)   SpO2 (!) 92%   BMI 26.31 kg/m²     General: No distress, No fever or chills  Lungs:Clear to auscultation bilaterally, No Crackles  Heart: Regular rate and rhythm, no murmur, gallops or rubs  Abdomen: Soft, no tenderness, bowel sounds normal  Extremities: Atraumatic, no edema in LE  Skin: Turgor normal. No rashes  Neurologic: No focal weakness, oriented.  Dialysis Access: Right AVF        LABS:  Lab Results   Component Value Date "    HGB 9.3 (L) 06/07/2024        Lab Results   Component Value Date    CREATININE 9.4 (H) 06/07/2024       Prot/Creat Ratio, Urine   Date Value Ref Range Status   12/15/2013 3.2 (H) 0.0 - 0.2 Final       Lab Results   Component Value Date     06/07/2024    K 3.7 06/07/2024    CO2 23 06/07/2024       last PTH   Lab Results   Component Value Date     (H) 05/08/2024    CALCIUM 10.6 (H) 06/07/2024    PHOS 4.2 03/13/2024       Lab Results   Component Value Date    HGBA1C 4.5 (L) 04/11/2024       Lab Results   Component Value Date    LDLCALC 33.6 (L) 03/11/2024         Creatinine, Urine   Date Value Ref Range Status   12/17/2013 85.0 23.0 - 375.0 mg/dL Final     Comment:     The random urine reference ranges provided were established   for 24 hour urine collections.  No reference ranges exist for  random urine specimens.  Correlate clinically.   12/16/2013 131.0 23.0 - 375.0 mg/dL Final     Comment:     The random urine reference ranges provided were established   for 24 hour urine collections.  No reference ranges exist for  random urine specimens.  Correlate clinically.   12/15/2013 123.0 23.0 - 375.0 mg/dL Final     Comment:     The random urine reference ranges provided were established   for 24 hour urine collections.  No reference ranges exist for  random urine specimens.  Correlate clinically.       Protein, Urine Random   Date Value Ref Range Status   12/15/2013 394 (H) 0 - 15 mg/dL Final     Comment:     The random urine reference ranges provided were established   for 24 hour urine collections.  No reference ranges exist for  random urine specimens.  Correlate clinically.       Prot/Creat Ratio, Urine   Date Value Ref Range Status   12/15/2013 3.2 (H) 0.0 - 0.2 Final         ASSESSMENT AND PLAN:    End stage renal failure on dialysis  ESRD on iHD MWF  Dr. Butt  4 hours  ALBARO AVF  EDW ~ 71 kg  No residual renal fx        - Ordered dialysis for today with low flows considering his NSTEMI. Next  planned dialysis session on Monday unless emergently needed over the weekend again.  - Will try and remove 2 Li of fluids today  - Consent obtained.      3:27 PM    Ole Reyes MD  Nephrology & Critical Care

## 2024-06-07 NOTE — ASSESSMENT & PLAN NOTE
- briefly required nitro gtt in the ED, now chest pain free after gtt stopped  - EKG without ST/T wave changes  - trop 0.161>>0.312 >>trend  - cardiology consulted; suspect type 2 demand but recommend continuing heparin for now  - continue ASA, plavix, heparin gtt  - continue home anti anginals: coreg 50 mg BID, nifedipine 90 mg daily, increase imdur to 120 mg   - volume removal with HD

## 2024-06-07 NOTE — SUBJECTIVE & OBJECTIVE
Past Medical History:   Diagnosis Date    CAD (coronary artery disease), native coronary artery 11/21/2019    Cataract     Diabetes mellitus     Diabetic retinopathy     Dialysis patient     DM type 2 causing renal disease, not at goal     ESRD (end stage renal disease) started dialysis 01/2014 06/05/2014    History of colon polyps 02/10/2021    History of COVID-19 12/29/2022    Hyperparathyroidism, secondary renal 06/05/2014    Hypertension     NSTEMI (non-ST elevated myocardial infarction) 12/21/2013    Organ transplant candidate 06/05/2014    Pneumonia     Post PTCA 08/26/2020    RCA [facility] 7-25-20    Renal cell carcinoma of right kidney     Renal hypertension     Stroke        Past Surgical History:   Procedure Laterality Date    ANGIOGRAM, CORONARY, WITH LEFT HEART CATHETERIZATION N/A 7/1/2021    Procedure: Angiogram, Coronary, with Left Heart Cath;  Surgeon: Miki Zendejas MD;  Location: [facility] CATH LAB;  Service: Cardiology;  Laterality: N/A;    ANGIOGRAM, CORONARY, WITH LEFT HEART CATHETERIZATION N/A 11/28/2023    Procedure: Angiogram, Coronary, with Left Heart Cath;  Surgeon: Noah Pendleton MD;  Location: [facility] CATH LAB;  Service: Cardiology;  Laterality: N/A;    COLONOSCOPY N/A 5/3/2017    Procedure: COLONOSCOPY;  Surgeon: Rufino Carpenter MD;  Location: [facility] (4TH FLR);  Service: Endoscopy;  Laterality: N/A;  pt states can only schedule on Wednesdays    COLONOSCOPY N/A 2/9/2021    Procedure: COLONOSCOPY;  Surgeon: Edil Wong MD;  Location: [facility] ENDO (4TH FLR);  Service: Endoscopy;  Laterality: N/A;  Dialysis MWF/ labwork day of procedure  right arm aceess  per Dr. Colin pt can hold Plavix 5 days prior see note- sm  COVID test on 2/6/21 at [facility] - [facility]    COLONOSCOPY N/A 2/10/2021    Procedure: COLONOSCOPY;  Surgeon: Robert Ayers MD;  Location: [facility] ENDO (4TH FLR);  Service: Endoscopy;  Laterality: N/A;  rescheduled due to poor bowel prep-BB  negative covid screening 2/6/21-BB  dialysis  [id number]  okay to r/s for 2/9/21 and to hold Plavix per Dr. KIRAN Wong-JAZIEL  labs same day-BB    CORONARY STENT PLACEMENT N/A 7/25/2019    Procedure: INSERTION, STENT, CORONARY ARTERY;  Surgeon: Miki Zendejas MD;  Location: [facility] CATH LAB;  Service: Cardiology;  Laterality: N/A;    DIALYSIS FISTULA CREATION      FISTULOGRAM N/A 2/18/2019    Procedure: Fistulogram;  Surgeon: Yaneth De La Cruz MD;  Location: [facility] CATH LAB;  Service: Cardiology;  Laterality: N/A;    FISTULOGRAM Right 7/23/2019    Procedure: Fistulogram;  Surgeon: Oz Cordoba MD;  Location: [facility] CATH LAB;  Service: Cardiology;  Laterality: Right;    LAPAROSCOPIC ROBOT-ASSISTED SURGICAL REMOVAL OF KIDNEY USING DA JAIME XI Right 5/19/2022    Procedure: XI ROBOTIC NEPHRECTOMY;  Surgeon: Aidan Hendricks MD;  [facility];  Service: Urology;  Laterality: Right;  3hrs    LAPAROSCOPIC ROBOT-ASSISTED SURGICAL REMOVAL OF KIDNEY USING DA JAIME XI Left 1/5/2023    Procedure: XI ROBOTIC NEPHRECTOMY;  Surgeon: Aidan Hendricks MD;  [facility];  Service: Urology;  Laterality: Left;  4 hrs    LEFT HEART CATHETERIZATION Left 7/25/2019    Procedure: Left heart cath;  Surgeon: Miki Zendejas MD;  Location: [facility] CATH LAB;  Service: Cardiology;  Laterality: Left;    REPAIR  1/5/2023    Procedure: REPAIR; COLOTOMY;  Surgeon: Maikel Lamb MD;  [facility];  Service: General;;    RETINAL LASER PROCEDURE Bilateral 2018 or 2017    Dr. Rothman    VASCULAR SURGERY         Review of patient's allergies indicates:   Allergen Reactions    No known allergies        Current Facility-Administered Medications on File Prior to Encounter   Medication    lidocaine (PF) 10 mg/ml (1%) injection 10 mg     Current Outpatient Medications on File Prior to Encounter   Medication Sig    aspirin (ECOTRIN) 81 MG EC tablet Take 1 tablet (81 mg total) by mouth once daily.    atorvastatin (LIPITOR) 80 MG tablet Take 1 tablet (80 mg total)  by mouth once daily.    carvediloL (COREG) 25 MG tablet Take 2 tablets (50 mg total) by mouth 2 (two) times daily with meals.    ciclopirox (PENLAC) 8 % Soln Apply topically nightly.    ciclopirox (PENLAC) 8 % Soln Apply topically nightly.    cinacalcet (SENSIPAR) 30 MG Tab Take by mouth.    clopidogreL (PLAVIX) 75 mg tablet Take 1 tablet (75 mg total) by mouth once daily.    ezetimibe (ZETIA) 10 mg tablet Take 1 tablet (10 mg total) by mouth once daily.    hydrALAZINE (APRESOLINE) 50 MG tablet Take 1 tablet (50 mg total) by mouth every 12 (twelve) hours.    isosorbide mononitrate (IMDUR) 60 MG 24 hr tablet Take 1 tablet (60 mg total) by mouth once daily.    NIFEdipine (PROCARDIA-XL) 90 MG (OSM) 24 hr tablet Take 1 tablet (90 mg total) by mouth once daily.    nitroGLYCERIN (NITROSTAT) 0.4 MG SL tablet Place 1 tablet (0.4 mg total) under the tongue every 5 (five) minutes as needed for Chest pain.    polyethylene glycol (GLYCOLAX) 17 gram/dose powder Take 17 g by mouth daily as needed for Constipation.    sevelamer carbonate (RENVELA) 800 mg Tab Take 800 mg by mouth 3 (three) times daily with meals.    VELPHORO 500 mg Chew Take 2 tablets by mouth.    vit B complex-C-folic ac-zinc (RENAPLEX) 800 mcg- 12.5 mg Tab Take 1 tablet by mouth.     Family History       Problem Relation (Age of Onset)    Cancer Maternal Grandfather    Cataracts Mother    Diabetes Mother    Heart disease Brother    Hypertension Mother, Father, Sister, Brother    Kidney disease Brother          Tobacco Use    Smoking status: Never    Smokeless tobacco: Never   Substance and Sexual Activity    Alcohol use: Not Currently     Comment: One drink a month    Drug use: No    Sexual activity: Yes     Partners: Female     Birth control/protection: None     Review of Systems   Constitutional:  Negative for activity change, chills and fever.   HENT:  Negative for trouble swallowing.    Eyes:  Negative for photophobia and visual disturbance.   Respiratory:   Positive for cough, chest tightness and shortness of breath. Negative for wheezing.    Cardiovascular:  Positive for chest pain. Negative for palpitations and leg swelling.   Gastrointestinal:  Negative for abdominal pain, constipation, diarrhea, nausea and vomiting.   Genitourinary:  Positive for decreased urine volume (2/2 ESRD). Negative for dysuria, frequency, hematuria and urgency.   Musculoskeletal:  Negative for arthralgias, back pain and gait problem.   Skin:  Negative for color change and rash.   Neurological:  Negative for dizziness, syncope, weakness, light-headedness, numbness and headaches.   Psychiatric/Behavioral:  Negative for agitation and confusion. The patient is not nervous/anxious.      Objective:     Vital Signs (Most Recent):  Temp: 98 °F (36.7 °C) (06/07/24 0750)  Pulse: 91 (06/07/24 0858)  Resp: (!) 22 (06/07/24 0858)  BP: (!) 162/80 (06/07/24 0858)  SpO2: 95 % (06/07/24 0858) Vital Signs (24h Range):  Temp:  [97.8 °F (36.6 °C)-98.1 °F (36.7 °C)] 98 °F (36.7 °C)  Pulse:  [] 91  Resp:  [16-22] 22  SpO2:  [88 %-99 %] 95 %  BP: (162-180)/() 162/80     Weight: 72.6 kg (160 lb)  Body mass index is 25.06 kg/m².     Physical Exam  Vitals and nursing note reviewed.   Constitutional:       General: He is not in acute distress.     Appearance: He is well-developed.   HENT:      Head: Normocephalic and atraumatic.      Mouth/Throat:      Pharynx: No oropharyngeal exudate.   Eyes:      General: No scleral icterus.     Conjunctiva/sclera: Conjunctivae normal.   Cardiovascular:      Rate and Rhythm: Regular rhythm. Tachycardia present.      Heart sounds: Normal heart sounds.   Pulmonary:      Effort: Pulmonary effort is normal. No respiratory distress.      Breath sounds: Decreased air movement present. No wheezing.      Comments: Crackles to mid- lower lobes with very diminished BS throughout  Abdominal:      General: Bowel sounds are normal. There is no distension.      Palpations: Abdomen  is soft.      Tenderness: There is no abdominal tenderness.   Musculoskeletal:         General: No tenderness. Normal range of motion.      Cervical back: Normal range of motion and neck supple.      Right lower leg: No edema.      Left lower leg: No edema.   Lymphadenopathy:      Cervical: No cervical adenopathy.   Skin:     General: Skin is warm and dry.      Capillary Refill: Capillary refill takes less than 2 seconds.      Findings: No rash.   Neurological:      Mental Status: He is alert and oriented to person, place, and time.      Cranial Nerves: No cranial nerve deficit.      Sensory: No sensory deficit.      Coordination: Coordination normal.   Psychiatric:         Behavior: Behavior normal.         Thought Content: Thought content normal.         Judgment: Judgment normal.                Significant Labs: All pertinent labs within the past 24 hours have been reviewed.  CBC:   Recent Labs   Lab 06/07/24  0328 06/07/24  0756   WBC 12.19 12.51   HGB 9.7* 9.3*   HCT 31.1* 31.6*    333     CMP:   Recent Labs   Lab 06/07/24  0328      K 3.7      CO2 23      BUN 41*   CREATININE 9.4*   CALCIUM 10.6*   PROT 7.4   ALBUMIN 3.0*   BILITOT 0.7   ALKPHOS 72   AST 16   ALT 13   ANIONGAP 13       Significant Imaging: I have reviewed all pertinent imaging results/findings within the past 24 hours.  CTA Chest Non-Coronary (PE Studies)  Narrative: EXAMINATION:  CTA CHEST NON CORONARY (PE STUDIES)    CLINICAL HISTORY:  Pulmonary embolism (PE) suspected, positive D-dimer;    TECHNIQUE:  Low dose axial images, sagittal and coronal reformations were obtained from the thoracic inlet to the lung bases following the IV administration of 75 mL of Omnipaque 350.  Contrast timing was optimized to evaluate the pulmonary arteries.  MIP images were performed.    COMPARISON:  CT abdomen pelvis, 01/14/2023.    FINDINGS:  Examination of the soft tissue and vascular structures at the base of the neck is  unremarkable.    The thoracic aorta maintains normal caliber, contour, and course with moderate atherosclerotic calcification.  There is no evidence of aneurysmal dilation or dissection.    The pulmonary arteries distribute normally without evidence of filling defect to indicate pulmonary thromboembolism.  Exam quality is slightly limited by motion and suboptimal bolus timing.    The trachea and proximal airways are patent.    The lungs are symmetrically expanded and there are diffuse bilateral ground-glass opacities with interlobular septal thickening.  Patchy airspace and consolidative opacities in the bilateral lower lobes, right middle lobe, and inferior aspect of the left upper lobe.  Scattered small ground-glass nodules measuring up to 7 mm in size (for example, series 3, image 196).  Small bilateral pleural effusions, right side greater than left.  Detailed evaluation of the pulmonary parenchyma limited by motion.    The heart is enlarged.  Trace pericardial fluid.  Coronary artery calcifications.  No abnormal bowing of the interventricular septum.    Borderline prominent mediastinal lymph nodes.  Prevascular mediastinal lymph node measuring 1.2 cm in short axis (series 2, image 192).  Suspect prominent subcarinal lymph nodes, though difficult to differentiate from the esophagus.    The esophagus demonstrates no acute abnormality.    Limited images of the upper abdomen obtained during the course of this dedicated thoracic CT is negative for acute findings.  Small volume ascites partially imaged in the incompletely imaged upper abdomen.    The osseous structures are negative for acute finding or aggressive osseous lesion.  Mild sclerotic changes likely related to renal osteodystrophy.  Impression: No evidence of acute pulmonary embolus to the proximal segmental level.    Cardiomegaly with bilateral ground-glass and airspace opacities suggestive of pulmonary edema.  Correlation with clinical findings will be  needed to exclude superimposed aspiration or pneumonia in the lower lobes and inferior left upper lobe.    Multiple small bilateral ground-glass nodular opacities which could be infectious/inflammatory.  For multiple sub solid nodules with any ?6 mm, Fleischner Society 2017 guidelines recommend short term follow up non-contrast chest CT in 3-6 months, as these may be secondary to infectious etiology. If these findings persist at that time, subsequent management should be based on the most suspicious nodule.    Atherosclerosis and coronary artery calcifications.    Probable small volume ascites in the upper abdomen.    Electronically signed by: Shilo Floers MD  Date:    06/07/2024  Time:    07:04  X-Ray Chest AP Portable  Narrative: EXAMINATION:  XR CHEST AP PORTABLE    CLINICAL HISTORY:  Chest Pain;    TECHNIQUE:  Single frontal view of the chest was performed.    COMPARISON:  None    FINDINGS:  No consolidation, right pleural effusion or pneumothorax.    Cardiomegaly with bilateral edema.  Small left effusion suggested.  Impression: Cardiomegaly with bilateral edema.  Small left effusion suggested.    Electronically signed by: Moses Gotti MD  Date:    06/07/2024  Time:    05:39

## 2024-06-07 NOTE — ASSESSMENT & PLAN NOTE
Chronic, uncontrolled. Latest blood pressure and vitals reviewed-     Temp:  [97.8 °F (36.6 °C)-98.1 °F (36.7 °C)]   Pulse:  []   Resp:  [16-22]   BP: (162-180)/()   SpO2:  [88 %-99 %] .   Home meds for hypertension were reviewed and noted below.   Hypertension Medications               carvediloL (COREG) 25 MG tablet Take 2 tablets (50 mg total) by mouth 2 (two) times daily with meals.    hydrALAZINE (APRESOLINE) 50 MG tablet Take 1 tablet (50 mg total) by mouth every 12 (twelve) hours.    isosorbide mononitrate (IMDUR) 60 MG 24 hr tablet Take 1 tablet (60 mg total) by mouth once daily.    NIFEdipine (PROCARDIA-XL) 90 MG (OSM) 24 hr tablet Take 1 tablet (90 mg total) by mouth once daily.    nitroGLYCERIN (NITROSTAT) 0.4 MG SL tablet Place 1 tablet (0.4 mg total) under the tongue every 5 (five) minutes as needed for Chest pain.            While in the hospital, will manage blood pressure as follows; Continue home antihypertensive regimen    Will utilize p.r.n. blood pressure medication only if patient's blood pressure greater than 180/110 and he develops symptoms such as worsening chest pain or shortness of breath.

## 2024-06-07 NOTE — CARE UPDATE
I have reviewed the chart of Haroldo Dickinson who is hospitalized for the following:    Active Hospital Problems    Diagnosis    *NSTEMI (non-ST elevated myocardial infarction)    Pleural effusion    H/o renal cell carcinoma of left kidney    Type 2 diabetes mellitus with chronic kidney disease on chronic dialysis, without long-term current use of insulin    Chronic diastolic heart failure    Coronary artery disease involving native coronary artery of native heart with unstable angina pectoris    Essential hypertension    ESRD on hemodialysis    Hemiparesis affecting right side as late effect of cerebrovascular accident    Acute respiratory failure with hypoxia        Pola Monsivais PA-C  [facility]

## 2024-06-07 NOTE — H&P
"  [facility] - Emergency Dept  Hospital Medicine  History & Physical    Patient Name: Haroldo Dickinson  MRN: [id number]  Patient Class: IP- Inpatient  Admission Date: 6/7/2024  Attending Physician: Kassi Joseph MD   Primary Care Provider: Mireya Larose MD         Patient information was obtained from patient, past medical records, and ER records.     Subjective:     Principal Problem:NSTEMI (non-ST elevated myocardial infarction)    Chief Complaint:   Chief Complaint   Patient presents with    Chest Pain     Left sided CP since 9pm. Reports pain was relieved with SL nitro. Pt received nitro and 324mg ASA with EMS. Hx of kidney transplant        HPI: Haroldo Dickinson is a 48 y.o. male with a PMHx of CAD stents, diabetes, hypertension, ESRD on dialysis, hx CVA right sided residual deficits who presents to [facility] [place] for evaluation of chest pain. Patient reports acute onset left sided chest discomfort began while sitting in a chair last night. Described pain as his heart "beating out my chest". Reports some relief with SL nitro, however paint returned shortly after which prompted him to call 911. He reports left arm pain for the past few weeks which resolved after given dilaudid in the ED earlier today. Reports chest congestion and nonproductive cough for the past 2 weeks. Chest pain worsens with coughing fits. Denies fever, chills, LE swelling, HA, vision changes, or syncope.     Of note, patient has known CAD and underwent LHC 11/2023 with 2 vessel disease (90% LAD and  of RCA). He has frequent admissions for chest pain and NSTEMI. Anatomy is apparently not amenable to revascularization so medical therapy has been recommended.     ED: hypertensive to /100 and mildly tachycardic to 102. Satting 88% on RA, placed on 2L NC. WBC 12.51, D-dimer 4.14, BNP 1542, trop 0.1>>0.31. EKG without acute ST/T wave changes. CTA negative for PE but shows findings of pulmonary edema with possible superimposed aspiration or PNA.     Past " Medical History:   Diagnosis Date    CAD (coronary artery disease), native coronary artery 11/21/2019    Cataract     Diabetes mellitus     Diabetic retinopathy     Dialysis patient     DM type 2 causing renal disease, not at goal     ESRD (end stage renal disease) started dialysis 01/2014 06/05/2014    History of colon polyps 02/10/2021    History of COVID-19 12/29/2022    Hyperparathyroidism, secondary renal 06/05/2014    Hypertension     NSTEMI (non-ST elevated myocardial infarction) 12/21/2013    Organ transplant candidate 06/05/2014    Pneumonia     Post PTCA 08/26/2020    RCA [facility]ARY 7-25-20    Renal cell carcinoma of right kidney     Renal hypertension     Stroke        Past Surgical History:   Procedure Laterality Date    ANGIOGRAM, CORONARY, WITH LEFT HEART CATHETERIZATION N/A 7/1/2021    Procedure: Angiogram, Coronary, with Left Heart Cath;  Surgeon: Miki Zendejas MD;  Location: [facility] CATH LAB;  Service: Cardiology;  Laterality: N/A;    ANGIOGRAM, CORONARY, WITH LEFT HEART CATHETERIZATION N/A 11/28/2023    Procedure: Angiogram, Coronary, with Left Heart Cath;  Surgeon: Noah Pendleton MD;  Location: [facility] CATH LAB;  Service: Cardiology;  Laterality: N/A;    COLONOSCOPY N/A 5/3/2017    Procedure: COLONOSCOPY;  Surgeon: Rufino Carpenter MD;  Location: [facility] (4TH FLR);  Service: Endoscopy;  Laterality: N/A;  pt states can only schedule on Wednesdays    COLONOSCOPY N/A 2/9/2021    Procedure: COLONOSCOPY;  Surgeon: Edil Wong MD;  Location: [facility] ENDO (4TH FLR);  Service: Endoscopy;  Laterality: N/A;  Dialysis MWF/ labwork day of procedure  right arm aceess  per Dr. Colin pt can hold Plavix 5 days prior see note- sm  COVID test on 2/6/21 at [facility] - sm    COLONOSCOPY N/A 2/10/2021    Procedure: COLONOSCOPY;  Surgeon: Robert Ayers MD;  Location: [facility] ENDO (4TH FLR);  Service: Endoscopy;  Laterality: N/A;  rescheduled due to poor bowel prep-BB  negative covid screening 2/6/21-BB  dialysis M-W-F-BB  okay  to r/s for 2/9/21 and to hold Plavix per Dr. KIRAN Wong-BB  labs same day-BB    CORONARY STENT PLACEMENT N/A 7/25/2019    Procedure: INSERTION, STENT, CORONARY ARTERY;  Surgeon: Miki Zendejas MD;  Location: [facility] CATH LAB;  Service: Cardiology;  Laterality: N/A;    DIALYSIS FISTULA CREATION      FISTULOGRAM N/A 2/18/2019    Procedure: Fistulogram;  Surgeon: Yaneth De La Cruz MD;  Location: [facility] CATH LAB;  Service: Cardiology;  Laterality: N/A;    FISTULOGRAM Right 7/23/2019    Procedure: Fistulogram;  Surgeon: Oz Cordoba MD;  Location: [facility] CATH LAB;  Service: Cardiology;  Laterality: Right;    LAPAROSCOPIC ROBOT-ASSISTED SURGICAL REMOVAL OF KIDNEY USING DA JAIME XI Right 5/19/2022    Procedure: XI ROBOTIC NEPHRECTOMY;  Surgeon: Aidan Hendricks MD;  [facility];  Service: Urology;  Laterality: Right;  3hrs    LAPAROSCOPIC ROBOT-ASSISTED SURGICAL REMOVAL OF KIDNEY USING DA JAIME XI Left 1/5/2023    Procedure: XI ROBOTIC NEPHRECTOMY;  Surgeon: Aidan Hendricks MD;  Location: [facility] OR [facility] FLR;  Service: Urology;  Laterality: Left;  4 hrs    LEFT HEART CATHETERIZATION Left 7/25/2019    Procedure: Left heart cath;  Surgeon: Miki Zendejas MD;  Location: [facility] CATH LAB;  Service: Cardiology;  Laterality: Left;    REPAIR  1/5/2023    Procedure: REPAIR; COLOTOMY;  Surgeon: Maikel Lamb MD;  [facility];  Service: General;;    RETINAL LASER PROCEDURE Bilateral 2018 or 2017    Dr. Rothman    VASCULAR SURGERY         Review of patient's allergies indicates:   Allergen Reactions    No known allergies        Current Facility-Administered Medications on File Prior to Encounter   Medication    lidocaine (PF) 10 mg/ml (1%) injection 10 mg     Current Outpatient Medications on File Prior to Encounter   Medication Sig    aspirin (ECOTRIN) 81 MG EC tablet Take 1 tablet (81 mg total) by mouth once daily.    atorvastatin (LIPITOR) 80 MG tablet Take 1 tablet (80 mg total) by mouth once  daily.    carvediloL (COREG) 25 MG tablet Take 2 tablets (50 mg total) by mouth 2 (two) times daily with meals.    ciclopirox (PENLAC) 8 % Soln Apply topically nightly.    ciclopirox (PENLAC) 8 % Soln Apply topically nightly.    cinacalcet (SENSIPAR) 30 MG Tab Take by mouth.    clopidogreL (PLAVIX) 75 mg tablet Take 1 tablet (75 mg total) by mouth once daily.    ezetimibe (ZETIA) 10 mg tablet Take 1 tablet (10 mg total) by mouth once daily.    hydrALAZINE (APRESOLINE) 50 MG tablet Take 1 tablet (50 mg total) by mouth every 12 (twelve) hours.    isosorbide mononitrate (IMDUR) 60 MG 24 hr tablet Take 1 tablet (60 mg total) by mouth once daily.    NIFEdipine (PROCARDIA-XL) 90 MG (OSM) 24 hr tablet Take 1 tablet (90 mg total) by mouth once daily.    nitroGLYCERIN (NITROSTAT) 0.4 MG SL tablet Place 1 tablet (0.4 mg total) under the tongue every 5 (five) minutes as needed for Chest pain.    polyethylene glycol (GLYCOLAX) 17 gram/dose powder Take 17 g by mouth daily as needed for Constipation.    sevelamer carbonate (RENVELA) 800 mg Tab Take 800 mg by mouth 3 (three) times daily with meals.    VELPHORO 500 mg Chew Take 2 tablets by mouth.    vit B complex-C-folic ac-zinc (RENAPLEX) 800 mcg- 12.5 mg Tab Take 1 tablet by mouth.     Family History       Problem Relation (Age of Onset)    Cancer Maternal Grandfather    Cataracts Mother    Diabetes Mother    Heart disease Brother    Hypertension Mother, Father, Sister, Brother    Kidney disease Brother          Tobacco Use    Smoking status: Never    Smokeless tobacco: Never   Substance and Sexual Activity    Alcohol use: Not Currently     Comment: One drink a month    Drug use: No    Sexual activity: Yes     Partners: Female     Birth control/protection: None     Review of Systems   Constitutional:  Negative for activity change, chills and fever.   HENT:  Negative for trouble swallowing.    Eyes:  Negative for photophobia and visual disturbance.   Respiratory:  Positive for  cough, chest tightness and shortness of breath. Negative for wheezing.    Cardiovascular:  Positive for chest pain. Negative for palpitations and leg swelling.   Gastrointestinal:  Negative for abdominal pain, constipation, diarrhea, nausea and vomiting.   Genitourinary:  Positive for decreased urine volume (2/2 ESRD). Negative for dysuria, frequency, hematuria and urgency.   Musculoskeletal:  Negative for arthralgias, back pain and gait problem.   Skin:  Negative for color change and rash.   Neurological:  Negative for dizziness, syncope, weakness, light-headedness, numbness and headaches.   Psychiatric/Behavioral:  Negative for agitation and confusion. The patient is not nervous/anxious.      Objective:     Vital Signs (Most Recent):  Temp: 98 °F (36.7 °C) (06/07/24 0750)  Pulse: 91 (06/07/24 0858)  Resp: (!) 22 (06/07/24 0858)  BP: (!) 162/80 (06/07/24 0858)  SpO2: 95 % (06/07/24 0858) Vital Signs (24h Range):  Temp:  [97.8 °F (36.6 °C)-98.1 °F (36.7 °C)] 98 °F (36.7 °C)  Pulse:  [] 91  Resp:  [16-22] 22  SpO2:  [88 %-99 %] 95 %  BP: (162-180)/() 162/80     Weight: 72.6 kg (160 lb)  Body mass index is 25.06 kg/m².     Physical Exam  Vitals and nursing note reviewed.   Constitutional:       General: He is not in acute distress.     Appearance: He is well-developed.   HENT:      Head: Normocephalic and atraumatic.      Mouth/Throat:      Pharynx: No oropharyngeal exudate.   Eyes:      General: No scleral icterus.     Conjunctiva/sclera: Conjunctivae normal.   Cardiovascular:      Rate and Rhythm: Regular rhythm. Tachycardia present.      Heart sounds: Normal heart sounds.   Pulmonary:      Effort: Pulmonary effort is normal. No respiratory distress.      Breath sounds: Decreased air movement present. No wheezing.      Comments: Crackles to mid- lower lobes with very diminished BS throughout  Abdominal:      General: Bowel sounds are normal. There is no distension.      Palpations: Abdomen is soft.       Tenderness: There is no abdominal tenderness.   Musculoskeletal:         General: No tenderness. Normal range of motion.      Cervical back: Normal range of motion and neck supple.      Right lower leg: No edema.      Left lower leg: No edema.   Lymphadenopathy:      Cervical: No cervical adenopathy.   Skin:     General: Skin is warm and dry.      Capillary Refill: Capillary refill takes less than 2 seconds.      Findings: No rash.   Neurological:      Mental Status: He is alert and oriented to person, place, and time.      Cranial Nerves: No cranial nerve deficit.      Sensory: No sensory deficit.      Coordination: Coordination normal.   Psychiatric:         Behavior: Behavior normal.         Thought Content: Thought content normal.         Judgment: Judgment normal.                Significant Labs: All pertinent labs within the past 24 hours have been reviewed.  CBC:   Recent Labs   Lab 06/07/24  0328 06/07/24  0756   WBC 12.19 12.51   HGB 9.7* 9.3*   HCT 31.1* 31.6*    333     CMP:   Recent Labs   Lab 06/07/24  0328      K 3.7      CO2 23      BUN 41*   CREATININE 9.4*   CALCIUM 10.6*   PROT 7.4   ALBUMIN 3.0*   BILITOT 0.7   ALKPHOS 72   AST 16   ALT 13   ANIONGAP 13       Significant Imaging: I have reviewed all pertinent imaging results/findings within the past 24 hours.  CTA Chest Non-Coronary (PE Studies)  Narrative: EXAMINATION:  CTA CHEST NON CORONARY (PE STUDIES)    CLINICAL HISTORY:  Pulmonary embolism (PE) suspected, positive D-dimer;    TECHNIQUE:  Low dose axial images, sagittal and coronal reformations were obtained from the thoracic inlet to the lung bases following the IV administration of 75 mL of Omnipaque 350.  Contrast timing was optimized to evaluate the pulmonary arteries.  MIP images were performed.    COMPARISON:  CT abdomen pelvis, 01/14/2023.    FINDINGS:  Examination of the soft tissue and vascular structures at the base of the neck is unremarkable.    The  thoracic aorta maintains normal caliber, contour, and course with moderate atherosclerotic calcification.  There is no evidence of aneurysmal dilation or dissection.    The pulmonary arteries distribute normally without evidence of filling defect to indicate pulmonary thromboembolism.  Exam quality is slightly limited by motion and suboptimal bolus timing.    The trachea and proximal airways are patent.    The lungs are symmetrically expanded and there are diffuse bilateral ground-glass opacities with interlobular septal thickening.  Patchy airspace and consolidative opacities in the bilateral lower lobes, right middle lobe, and inferior aspect of the left upper lobe.  Scattered small ground-glass nodules measuring up to 7 mm in size (for example, series 3, image 196).  Small bilateral pleural effusions, right side greater than left.  Detailed evaluation of the pulmonary parenchyma limited by motion.    The heart is enlarged.  Trace pericardial fluid.  Coronary artery calcifications.  No abnormal bowing of the interventricular septum.    Borderline prominent mediastinal lymph nodes.  Prevascular mediastinal lymph node measuring 1.2 cm in short axis (series 2, image 192).  Suspect prominent subcarinal lymph nodes, though difficult to differentiate from the esophagus.    The esophagus demonstrates no acute abnormality.    Limited images of the upper abdomen obtained during the course of this dedicated thoracic CT is negative for acute findings.  Small volume ascites partially imaged in the incompletely imaged upper abdomen.    The osseous structures are negative for acute finding or aggressive osseous lesion.  Mild sclerotic changes likely related to renal osteodystrophy.  Impression: No evidence of acute pulmonary embolus to the proximal segmental level.    Cardiomegaly with bilateral ground-glass and airspace opacities suggestive of pulmonary edema.  Correlation with clinical findings will be needed to exclude  superimposed aspiration or pneumonia in the lower lobes and inferior left upper lobe.    Multiple small bilateral ground-glass nodular opacities which could be infectious/inflammatory.  For multiple sub solid nodules with any ?6 mm, Fleischner Society 2017 guidelines recommend short term follow up non-contrast chest CT in 3-6 months, as these may be secondary to infectious etiology. If these findings persist at that time, subsequent management should be based on the most suspicious nodule.    Atherosclerosis and coronary artery calcifications.    Probable small volume ascites in the upper abdomen.    Electronically signed by: Shilo Flores MD  Date:    06/07/2024  Time:    07:04  X-Ray Chest AP Portable  Narrative: EXAMINATION:  XR CHEST AP PORTABLE    CLINICAL HISTORY:  Chest Pain;    TECHNIQUE:  Single frontal view of the chest was performed.    COMPARISON:  None    FINDINGS:  No consolidation, right pleural effusion or pneumothorax.    Cardiomegaly with bilateral edema.  Small left effusion suggested.  Impression: Cardiomegaly with bilateral edema.  Small left effusion suggested.    Electronically signed by: Moses Gotti MD  Date:    06/07/2024  Time:    05:39      Assessment/Plan:     * NSTEMI (non-ST elevated myocardial infarction)  - briefly required nitro gtt in the ED, now chest pain free after gtt stopped  - EKG without ST/T wave changes  - trop 0.161>>0.312 >>trend  - cardiology consulted; suspect type 2 demand but recommend continuing heparin for now  - continue ASA, plavix, heparin gtt  - continue home anti anginals: coreg 50 mg BID, nifedipine 90 mg daily, increase imdur to 120 mg   - volume removal with HD    Acute respiratory failure with hypoxia  - satting 88% on RA, placed on 2L NC  - suspect largely due to volume overload but concern for superimposed PNA based on presentation and CTA chest finding  - volume removal with HD  - start rocephin and azithro for CAP coverage  - follow sputum  cx    H/o renal cell carcinoma of left kidney  - s/p nephrectomy, no acute issues    Type 2 diabetes mellitus with chronic kidney disease on chronic dialysis, without long-term current use of insulin  - diet controlled   - ACHS accuchecks     Chronic diastolic heart failure  - decompensated in the setting of ESRD  - HD for volume management   - strict I/Os, daily weights    Essential hypertension  Chronic, uncontrolled. Latest blood pressure and vitals reviewed-     Temp:  [97.8 °F (36.6 °C)-98.1 °F (36.7 °C)]   Pulse:  []   Resp:  [16-22]   BP: (162-180)/()   SpO2:  [88 %-99 %] .   Home meds for hypertension were reviewed and noted below.   Hypertension Medications               carvediloL (COREG) 25 MG tablet Take 2 tablets (50 mg total) by mouth 2 (two) times daily with meals.    hydrALAZINE (APRESOLINE) 50 MG tablet Take 1 tablet (50 mg total) by mouth every 12 (twelve) hours.    isosorbide mononitrate (IMDUR) 60 MG 24 hr tablet Take 1 tablet (60 mg total) by mouth once daily.    NIFEdipine (PROCARDIA-XL) 90 MG (OSM) 24 hr tablet Take 1 tablet (90 mg total) by mouth once daily.    nitroGLYCERIN (NITROSTAT) 0.4 MG SL tablet Place 1 tablet (0.4 mg total) under the tongue every 5 (five) minutes as needed for Chest pain.            While in the hospital, will manage blood pressure as follows; Continue home antihypertensive regimen    Will utilize p.r.n. blood pressure medication only if patient's blood pressure greater than 180/110 and he develops symptoms such as worsening chest pain or shortness of breath.    ESRD on hemodialysis  - MWF HD, last dialyzed on 6/5  - nephro consulted for iHD    Hemiparesis affecting right side as late effect of cerebrovascular accident  - no acute issues  - continue ASA, statin       VTE Risk Mitigation (From admission, onward)           Ordered     Reason for No Pharmacological VTE Prophylaxis  Once        Question:  Reasons:  Answer:  Physician Provided (leave comment)   Comment:  on heparin gtt    06/07/24 0931     IP VTE HIGH RISK PATIENT  Once         06/07/24 0931     heparin 25,000 units in dextrose 5% (100 units/ml) IV bolus from bag LOW INTENSITY nomogram - [facility]  As needed (PRN)        Question:  Heparin Infusion Adjustment (DO NOT MODIFY ANSWER)  Answer:  [email or web address]\Images\Pharmacy\HeparinInfusions\heparin LOW INTENSITY nomogram for [facility] [id number]    06/07/24 0729     heparin 25,000 units in dextrose 5% (100 units/ml) IV bolus from bag LOW INTENSITY nomogram - [facility]  As needed (PRN)        Question:  Heparin Infusion Adjustment (DO NOT MODIFY ANSWER)  Answer:  [email or web address]\Images\Pharmacy\HeparinInfusions\heparin LOW INTENSITY nomogram for [facility] [id number]    06/07/24 0729     heparin 25,000 units in dextrose 5% 250 mL (100 units/mL) infusion LOW INTENSITY nomogram - [facility]  Continuous        Question:  Begin at (units/kg/hr)  Answer:  12 06/07/24 0729                                    Taniya Carnes PA-C  Department of Hospital Medicine  [facility] - Emergency Dept

## 2024-06-07 NOTE — ED TRIAGE NOTES
Haroldo Dickinson, a 48 y.o. male presents to the ED w/ complaint of Cp that started atound 9pm. Pt took 1 dose SL nitro at home. Pt also received 2 dose SL nitro and 325mg Aspirin with EMS. Pt reports pain relieved with nitro briefly. Pt also reports nausea at time of examination. Hx of HD, Last session on Wed.     Triage note:  Chief Complaint   Patient presents with    Chest Pain     Left sided CP since 9pm. Reports pain was relieved with SL nitro. Pt received nitro and 324mg ASA with EMS. Hx of kidney transplant     Review of patient's allergies indicates:   Allergen Reactions    No known allergies      Past Medical History:   Diagnosis Date    CAD (coronary artery disease), native coronary artery 11/21/2019    Cataract     Diabetes mellitus     Diabetic retinopathy     Dialysis patient     DM type 2 causing renal disease, not at goal     ESRD (end stage renal disease) started dialysis 01/2014 06/05/2014    History of colon polyps 02/10/2021    History of COVID-19 12/29/2022    Hyperparathyroidism, secondary renal 06/05/2014    Hypertension     NSTEMI (non-ST elevated myocardial infarction) 12/21/2013    Organ transplant candidate 06/05/2014    Pneumonia     Post PTCA 08/26/2020    RCA [facility] 7-25-20    Renal cell carcinoma of right kidney     Renal hypertension     Stroke

## 2024-06-07 NOTE — ASSESSMENT & PLAN NOTE
- satting 88% on RA, placed on 2L NC  - suspect largely due to volume overload but concern for superimposed PNA based on presentation and CTA chest finding  - volume removal with HD  - start rocephin and azithro for CAP coverage  - follow sputum cx

## 2024-06-07 NOTE — ED NOTES
Jake SIMPSON, messaged via secure chat of pt's increasing O2 demand. Jake confirmed that once the patient is dialyzed the O2 stats should come back up to 90s. Pt currently stating high 80s on 3 L via NC. Care on going.

## 2024-06-07 NOTE — CARE UPDATE
RAPID RESPONSE NURSE PROACTIVE ROUNDING NOTE       Time of Visit: 3:15 PM    Admit Date: 2024  LOS: 0  Code Status: Full Code   Date of Visit: 2024  : 1975  Age: 48 y.o.  Sex: male  Race: Black or   Bed: ED :   MRN: [id number]  Was the patient discharged from an ICU this admission? No   Was the patient discharged from a PACU within last 24 hours? No   Did the patient receive conscious sedation/general anesthesia in last 24 hours? No  Was the patient in the ED within the past 24 hours? No  Was the patient on NIPPV within the past 24 hours? No   Attending Physician: Kassi Joseph MD  Primary Service: [facility] MED    Time spent at the bedside: 15 -30 min    SITUATION    Notified by bedside RN via phone call.  Reason for alert: Increased oxygen requirements  Called to evaluate the patient for Respiratory    BACKGROUND     Why is the patient in the hospital?: NSTEMI (non-ST elevated myocardial infarction)    Patient has a past medical history of CAD (coronary artery disease), native coronary artery, Cataract, Diabetes mellitus, Diabetic retinopathy, Dialysis patient, DM type 2 causing renal disease, not at goal, ESRD (end stage renal disease) started dialysis 2014, History of colon polyps, History of COVID-19, Hyperparathyroidism, secondary renal, Hypertension, NSTEMI (non-ST elevated myocardial infarction), Organ transplant candidate, Pneumonia, Post PTCA, Renal cell carcinoma of right kidney, Renal hypertension, and Stroke.    Last Vitals:  Temp: 98.1 °F (36.7 °C) ( 1047)  Pulse: 78 ( 1517)  Resp: 21 ( 1515)  BP: 164/91 ( 1517)  SpO2: 92 % ( 1517)    24 Hours Vitals Range:  Temp:  [97.8 °F (36.6 °C)-98.1 °F (36.7 °C)]   Pulse:  []   Resp:  [16-27]   BP: (161-180)/()   SpO2:  [86 %-99 %]     Labs:  Recent Labs     24  0000 24  0328 24  0756   WBC  --  12.19 12.51   HGB 9.4* 9.7* 9.3*   HCT  --  31.1* 31.6*   PLT  --  311  333       Recent Labs     06/07/24  0328      K 3.7      CO2 23   BUN 41*   CREATININE 9.4*           Recent Labs     06/07/24  0358   PH 7.393   PCO2 46.6*   PO2 51   HCO3 28.4*   POCSATURATED 85   BE 3*        ASSESSMENT      Physical Exam  Constitutional:       Appearance: He is obese. He is ill-appearing.      Interventions: Nasal cannula in place.   Cardiovascular:      Rate and Rhythm: Normal rate.   Pulmonary:      Effort: Pulmonary effort is normal.   Skin:     General: Skin is warm and dry.      Capillary Refill: Capillary refill takes less than 2 seconds.   Neurological:      Mental Status: He is oriented to person, place, and time. Mental status is at baseline.   Psychiatric:      Comments: WNL     RRT reported to bedside per Dr. Joseph's request after pt's supplemental oxygen requirements increased from 3L NC to 10L bubble flow NC. Physical assessment as noted above. SpO2 92%. HD in progress. Pt denies chest pain, increased work of breathing, dizziness, etc. He states he is feeling much better since he first came to the hospital. Dialysis RN x 2 at the bedside. Continuous monitoring in progress.     INTERVENTIONS    The patient was seen for Respiratory problem. Staff concerns included increased oxygen requirements. The following interventions were performed: continuous pulse ox monitoring and continuous cardiac monitoring.    RECOMMENDATIONS    - Continue HD  - Continue to trend troponin   - Continue ACS per cardiology   - Wean supplemental oxygen as tolerated - consider more in depth pulmonary w/u if oxygen requirements do not decrease s/p HD  - Stirct I/Os  - Fluid restriction  - Maintain PIV access  - Continue telemetry and continuous SpO2 monitoring       PROVIDER ESCALATION    Yes/No  Yes    Orders received and case discussed with Dr. Joseph .    Disposition: Remain in room ED 08.    FOLLOW-UP    Bedside Melania PEARCE  updated on plan of care. Instructed to call the Rapid Response  Nurse, Kaia Diaz RN at [facility] for additional questions or concerns.

## 2024-06-07 NOTE — ED NOTES
Dialysis nurse informed primary nurse that patient is stating 86% on 6 L. Both dialysis and primary nurse agreed to place patient on high flow O2 via NC. Primary nurse informed respiratory of pt's increasing O2 demand. Jake SIMPSON, made aware via secure chat and requested to inform rapids. Primary confirmed to inform rapid response and update Jake with any new changes. Care on going.

## 2024-06-07 NOTE — CONSULTS
[facility], [place]  Cardiology Consult     Haroldo Dickinson   YOB: 1975   Medical Record Number: [id number]   Attending Physician:    Date of Admission: 06/07/2024       Hospital Day:  0  Current Principal Problem:  <principal problem not specified>      History     Cc: chest pain     HPI  Mr Haroldo Dickinson is a 48 year old gentleman with PMH of HTN, HLD, h/o CVA, CAD, ESRD who presents to [facility] ED with chest pain. He has known CAD and underwent LHC 11/2023 with 2 vessel disease (90% LAD and  of RCA). He has frequent admissions for chest pain and NSTEMI. Anatomy is apparently not amenable to revascularization so medical therapy has been recommended. Historically EF has been preserved.     He reports when he went to dialysis 2 days ago they incorrectly recorded his weight and he did not undergo any ultrafiltration. He left feeling like he had extra fluid on him. He thought he would be ok to wait until next dialysis sessions (which would be today) however he developed significant chest discomfort last night prompting his presentation to the ED. He received nitroglycerin en route which improved his pain. On presentation to ED his BP was markedly elevated. Troponin was mildly elevated at 0.3 (was 25 during last admit for NSTEMI) BNP up at 1500, and EKG was without ischemic changes. He was started on heparin drip and nitroglycerin drip. He reports being chest pain free currently.       Medications - Outpatient  Prior to Admission medications    Medication Sig Start Date End Date Taking? Authorizing Provider   aspirin (ECOTRIN) 81 MG EC tablet Take 1 tablet (81 mg total) by mouth once daily. 11/29/23 11/28/24  Mathieu Esteban PA-C   atorvastatin (LIPITOR) 80 MG tablet Take 1 tablet (80 mg total) by mouth once daily. 11/30/23 11/29/24  Mathieu Esteban PA-C   carvediloL (COREG) 25 MG tablet Take 2 tablets (50 mg total) by mouth 2 (two) times daily with meals. 6/3/24 5/29/25  Mireya Larose MD    ciclopirox (PENLAC) 8 % Soln Apply topically nightly. 9/5/23   Aisha Olsen DPM   ciclopirox (PENLAC) 8 % Soln Apply topically nightly. 5/2/24   Aisha Olsen DPM   cinacalcet (SENSIPAR) 30 MG Tab Take by mouth. 3/25/24   Provider, Historical   clopidogreL (PLAVIX) 75 mg tablet Take 1 tablet (75 mg total) by mouth once daily. 6/3/24 6/3/25  Mireya Larose MD   ezetimibe (ZETIA) 10 mg tablet Take 1 tablet (10 mg total) by mouth once daily. 6/3/24 6/3/25  Mireya Larose MD   hydrALAZINE (APRESOLINE) 50 MG tablet Take 1 tablet (50 mg total) by mouth every 12 (twelve) hours. 4/4/24 4/4/25  Mireya Larose MD   isosorbide mononitrate (IMDUR) 60 MG 24 hr tablet Take 1 tablet (60 mg total) by mouth once daily. 6/2/24 6/2/25  Kelly Taveras MD   NIFEdipine (PROCARDIA-XL) 90 MG (OSM) 24 hr tablet Take 1 tablet (90 mg total) by mouth once daily. 6/2/24 6/2/25  Kelly Taveras MD   nitroGLYCERIN (NITROSTAT) 0.4 MG SL tablet Place 1 tablet (0.4 mg total) under the tongue every 5 (five) minutes as needed for Chest pain. 3/13/24 3/13/25  Omar Marino MD   polyethylene glycol (GLYCOLAX) 17 gram/dose powder Take 17 g by mouth daily as needed for Constipation. 4/4/24   Mireya Larose MD   sevelamer carbonate (RENVELA) 800 mg Tab Take 800 mg by mouth 3 (three) times daily with meals.    Provider, Historical   VELPHORO 500 mg Chew Take 2 tablets by mouth. 12/22/23   Provider, Historical   vit B complex-C-folic ac-zinc (RENAPLEX) 800 mcg- 12.5 mg Tab Take 1 tablet by mouth. 1/18/24   Provider, Historical         Medications - Current  Scheduled Meds:   heparin (PORCINE)  58.2 Units/kg (Adjusted) Intravenous Once     Continuous Infusions:   heparin (porcine) in D5W  0-40 Units/kg/hr (Adjusted) Intravenous Continuous        nitroGLYCERIN  5 mcg/min Intravenous Continuous 36 mL/hr at 06/07/24 0700 120 mcg/min at 06/07/24 0700     PRN Meds:.  Current Facility-Administered Medications:     heparin (PORCINE), 58.2 Units/kg (Adjusted),  Intravenous, PRN    heparin (PORCINE), 30 Units/kg (Adjusted), Intravenous, PRN      Allergies  Review of patient's allergies indicates:   Allergen Reactions    No known allergies          Past Medical History  Past Medical History:   Diagnosis Date    CAD (coronary artery disease), native coronary artery 11/21/2019    Cataract     Diabetes mellitus     Diabetic retinopathy     Dialysis patient     DM type 2 causing renal disease, not at goal     ESRD (end stage renal disease) started dialysis 01/2014 06/05/2014    History of colon polyps 02/10/2021    History of COVID-19 12/29/2022    Hyperparathyroidism, secondary renal 06/05/2014    Hypertension     NSTEMI (non-ST elevated myocardial infarction) 12/21/2013    Organ transplant candidate 06/05/2014    Pneumonia     Post PTCA 08/26/2020    RCA H[facility]ARY 7-25-20    Renal cell carcinoma of right kidney     Renal hypertension     Stroke          Past Surgical History  Past Surgical History:   Procedure Laterality Date    ANGIOGRAM, CORONARY, WITH LEFT HEART CATHETERIZATION N/A 7/1/2021    Procedure: Angiogram, Coronary, with Left Heart Cath;  Surgeon: Miki Zendejas MD;  Location: [facility] CATH LAB;  Service: Cardiology;  Laterality: N/A;    ANGIOGRAM, CORONARY, WITH LEFT HEART CATHETERIZATION N/A 11/28/2023    Procedure: Angiogram, Coronary, with Left Heart Cath;  Surgeon: Noah Pendleton MD;  Location: [facility] CATH LAB;  Service: Cardiology;  Laterality: N/A;    COLONOSCOPY N/A 5/3/2017    Procedure: COLONOSCOPY;  Surgeon: Rufino Carpenter MD;  Location: [address]);  Service: Endoscopy;  Laterality: N/A;  pt states can only schedule on Wednesdays    COLONOSCOPY N/A 2/9/2021    Procedure: COLONOSCOPY;  Surgeon: Edil Wong MD;  Location: [facility] ENDO [address]);  Service: Endoscopy;  Laterality: N/A;  Dialysis MWF/ labwork day of procedure  right arm aceess  per Dr. Colin pt can hold Plavix 5 days prior see note- sm  COVID test on 2/6/21 at [facility] - sm    COLONOSCOPY N/A  2/10/2021    Procedure: COLONOSCOPY;  Surgeon: Robert Ayers MD;  Location: [facility] ENDO (4TH FLR);  Service: Endoscopy;  Laterality: N/A;  rescheduled due to poor bowel prep-BB  negative covid screening 2/6/21-BB  dialysis M-W-F-BB  okay to r/s for 2/9/21 and to hold Plavix per Dr. KIRAN Wong-BB  labs same day-BB    CORONARY STENT PLACEMENT N/A 7/25/2019    Procedure: INSERTION, STENT, CORONARY ARTERY;  Surgeon: Miki Zendejas MD;  Location: [facility] CATH LAB;  Service: Cardiology;  Laterality: N/A;    DIALYSIS FISTULA CREATION      FISTULOGRAM N/A 2/18/2019    Procedure: Fistulogram;  Surgeon: Yaneth De La Cruz MD;  Location: [facility] CATH LAB;  Service: Cardiology;  Laterality: N/A;    FISTULOGRAM Right 7/23/2019    Procedure: Fistulogram;  Surgeon: Oz Cordoba MD;  Location: [facility] CATH LAB;  Service: Cardiology;  Laterality: Right;    LAPAROSCOPIC ROBOT-ASSISTED SURGICAL REMOVAL OF KIDNEY USING DA JAIME XI Right 5/19/2022    Procedure: XI ROBOTIC NEPHRECTOMY;  Surgeon: Aidan Hendricks MD;  Location: [facility] 2ND FLR;  Service: Urology;  Laterality: Right;  3hrs    LAPAROSCOPIC ROBOT-ASSISTED SURGICAL REMOVAL OF KIDNEY USING DA JAIME XI Left 1/5/2023    Procedure: XI ROBOTIC NEPHRECTOMY;  Surgeon: Aidan Hendricks MD;  [facility];  Service: Urology;  Laterality: Left;  4 hrs    LEFT HEART CATHETERIZATION Left 7/25/2019    Procedure: Left heart cath;  Surgeon: Miki Zendejas MD;  Location: [facility] CATH LAB;  Service: Cardiology;  Laterality: Left;    REPAIR  1/5/2023    Procedure: REPAIR; COLOTOMY;  Surgeon: Maikel Lamb MD;  Location: [facility] OR 2ND FLR;  Service: General;;    RETINAL LASER PROCEDURE Bilateral 2018 or 2017    Dr. Rothman    VASCULAR SURGERY           Social History  Social History     Socioeconomic History    Marital status: Single   Occupational History     Employer: [employer]   Tobacco Use    Smoking status: Never    Smokeless tobacco: Never   Substance and Sexual  Activity    Alcohol use: Not Currently     Comment: One drink a month    Drug use: No    Sexual activity: Yes     Partners: Female     Birth control/protection: None   Social History Narrative    Disabled    Was     One son          Social Determinants of Health     Financial Resource Strain: Low Risk  (3/11/2024)    Overall Financial Resource Strain (CARDIA)     Difficulty of Paying Living Expenses: Not hard at all   Food Insecurity: Patient Declined (3/11/2024)    Hunger Vital Sign     Worried About Running Out of Food in the Last Year: Patient declined     Ran Out of Food in the Last Year: Patient declined   Transportation Needs: No Transportation Needs (3/11/2024)    PRAPARE - Transportation     Lack of Transportation (Medical): No     Lack of Transportation (Non-Medical): No   Physical Activity: Inactive (3/11/2024)    Exercise Vital Sign     Days of Exercise per Week: 0 days     Minutes of Exercise per Session: 0 min   Stress: Patient Declined (3/11/2024)    [employer] - Occupational Stress Questionnaire     Feeling of Stress : Patient declined   Housing Stability: Low Risk  (3/11/2024)    Housing Stability Vital Sign     Unable to Pay for Housing in the Last Year: No     Number of Places Lived in the Last Year: 1     Unstable Housing in the Last Year: No         ROS  10 point ROS performed and negative except as stated in HPI     Physical Examination         Vital Signs  Vitals  Temp: 98 °F (36.7 °C)  Temp Source: Oral  Pulse: 94  Heart Rate Source: Monitor  Resp: 16  SpO2: (!) 91 %  Pulse Oximetry Type: Continuous  Flow (L/min) (Oxygen Therapy): 2  BP: (!) 162/85  MAP (mmHg): 117  BP Location: Left arm  BP Method: Automatic          24 Hour VS Range    Temp:  [97.8 °F (36.6 °C)-98.1 °F (36.7 °C)]   Pulse:  []   Resp:  [16-22]   BP: (162-180)/()   SpO2:  [88 %-99 %]     Intake/Output Summary (Last 24 hours) at 6/7/2024 0752  Last data filed at 6/7/2024  "0700  Gross per 24 hour   Intake 65.92 ml   Output --   Net 65.92 ml           Physical Exam:   Constitutional: no acute distress  HEENT: NCAT, EOMI, no scleral icterus  Cardiovascular: Regular rate and rhythm  Pulmonary: Normal respiratory effort   Abdomen: nontender, non-distended   Neuro: alert and oriented, no focal deficits  Extremities: warm, no edema   MSK: no deformities  Integument: intact, no rashes       Data       Recent Labs   Lab 06/05/24  0000 06/07/24  0328   WBC  --  12.19   HGB 9.4* 9.7*   HCT  --  31.1*   PLT  --  311        No results for input(s): "PROTIME", "INR" in the last 168 hours.     Recent Labs   Lab 06/07/24  0328      K 3.7      CO2 23   BUN 41*   CREATININE 9.4*   ANIONGAP 13   CALCIUM 10.6*        Recent Labs   Lab 06/07/24  0328   PROT 7.4   ALBUMIN 3.0*   BILITOT 0.7   ALKPHOS 72   AST 16   ALT 13        Recent Labs   Lab 06/07/24  0328 06/07/24  0631   TROPONINI 0.161* 0.312*        BNP (pg/mL)   Date Value   06/07/2024 1,542 (H)   03/10/2024 194 (H)   11/27/2023 179 (H)   05/01/2023 2,396 (H)   09/21/2015 26       No results for input(s): "LABBLOO" in the last 168 hours.         Assessment & Plan   48 year old gentleman with PMH of HTN, HLD, h/o CVA, CAD, ESRD who presents to [facility] ED with chest pain.     Hypertensive urgency  Volume overload  Troponin elevation:   Given degree of troponin elevation, lack of ischemic change on EKG, his presentation is likely secondary to type II ischemia in the setting of elevated BP and volume overload. Recommend giving him his home anti anginals, anti platelet agents, and removing volume with dialysis.   -Give home aspirin and plavix  -Can continue heparin for the time being  -Resume home anti anginals: coreg 50 mg BID, nifedipine 90 mg daily, increase imdur to 120 mg   -Stop nitroglycerin drip 30 minutes after giving home medications. If pain remains controlled admit to hospital medicine for further optimization of anti anginal, anti " hypertensive regimen and volume management with HD       Thank you for the consult. Cardiology will continue to follow.     Alfredo Andrews MD  [facility]   Cardiovascular Disease PGY-VI

## 2024-06-07 NOTE — PLAN OF CARE
Problem: Hemodialysis  Goal: Safe, Effective Therapy Delivery  Outcome: Progressing  Goal: Effective Tissue Perfusion  Outcome: Progressing  Goal: Absence of Infection Signs and Symptoms  Outcome: Progressing

## 2024-06-07 NOTE — NURSING
3.5hours  dialysis started at 1435horus . Right  forearm fistula  accessed with 15G  needles . Patient's SPO2 is sustaining between 86-88,RN Kamini notified  for respiratory evaluation .patient  tolerating the dialysis treatment .

## 2024-06-08 LAB
ANION GAP SERPL CALC-SCNC: 9 MMOL/L (ref 8–16)
APTT PPP: 35.5 SEC (ref 21–32)
APTT PPP: 36.4 SEC (ref 21–32)
BASOPHILS # BLD AUTO: 0.11 K/UL (ref 0–0.2)
BASOPHILS NFR BLD: 0.7 % (ref 0–1.9)
BUN SERPL-MCNC: 28 MG/DL (ref 6–20)
CALCIUM SERPL-MCNC: 9.7 MG/DL (ref 8.7–10.5)
CHLORIDE SERPL-SCNC: 100 MMOL/L (ref 95–110)
CO2 SERPL-SCNC: 23 MMOL/L (ref 23–29)
CREAT SERPL-MCNC: 7.4 MG/DL (ref 0.5–1.4)
DIFFERENTIAL METHOD BLD: ABNORMAL
EOSINOPHIL # BLD AUTO: 0.5 K/UL (ref 0–0.5)
EOSINOPHIL NFR BLD: 3.2 % (ref 0–8)
ERYTHROCYTE [DISTWIDTH] IN BLOOD BY AUTOMATED COUNT: 15.1 % (ref 11.5–14.5)
EST. GFR  (NO RACE VARIABLE): 8.5 ML/MIN/1.73 M^2
GLUCOSE SERPL-MCNC: 95 MG/DL (ref 70–110)
HBV CORE AB SERPL QL IA: NORMAL
HBV SURFACE AB SER-ACNC: 17.75 MIU/ML
HBV SURFACE AB SER-ACNC: REACTIVE M[IU]/ML
HBV SURFACE AG SERPL QL IA: NORMAL
HCT VFR BLD AUTO: 31.3 % (ref 40–54)
HGB BLD-MCNC: 9.9 G/DL (ref 14–18)
IMM GRANULOCYTES # BLD AUTO: 0.08 K/UL (ref 0–0.04)
IMM GRANULOCYTES NFR BLD AUTO: 0.5 % (ref 0–0.5)
LYMPHOCYTES # BLD AUTO: 1 K/UL (ref 1–4.8)
LYMPHOCYTES NFR BLD: 6.6 % (ref 18–48)
MAGNESIUM SERPL-MCNC: 1.8 MG/DL (ref 1.6–2.6)
MCH RBC QN AUTO: 29.3 PG (ref 27–31)
MCHC RBC AUTO-ENTMCNC: 31.6 G/DL (ref 32–36)
MCV RBC AUTO: 93 FL (ref 82–98)
MONOCYTES # BLD AUTO: 1.5 K/UL (ref 0.3–1)
MONOCYTES NFR BLD: 9.3 % (ref 4–15)
NEUTROPHILS # BLD AUTO: 12.5 K/UL (ref 1.8–7.7)
NEUTROPHILS NFR BLD: 79.7 % (ref 38–73)
NRBC BLD-RTO: 0 /100 WBC
OHS QRS DURATION: 96 MS
OHS QTC CALCULATION: 479 MS
PHOSPHATE SERPL-MCNC: 3.4 MG/DL (ref 2.7–4.5)
PLATELET # BLD AUTO: 348 K/UL (ref 150–450)
PMV BLD AUTO: 10.2 FL (ref 9.2–12.9)
POTASSIUM SERPL-SCNC: 4 MMOL/L (ref 3.5–5.1)
RBC # BLD AUTO: 3.38 M/UL (ref 4.6–6.2)
SODIUM SERPL-SCNC: 132 MMOL/L (ref 136–145)
TROPONIN I SERPL DL<=0.01 NG/ML-MCNC: 9.66 NG/ML (ref 0–0.03)
WBC # BLD AUTO: 15.62 K/UL (ref 3.9–12.7)

## 2024-06-08 PROCEDURE — 94640 AIRWAY INHALATION TREATMENT: CPT

## 2024-06-08 PROCEDURE — 87340 HEPATITIS B SURFACE AG IA: CPT | Performed by: STUDENT IN AN ORGANIZED HEALTH CARE EDUCATION/TRAINING PROGRAM

## 2024-06-08 PROCEDURE — 85025 COMPLETE CBC W/AUTO DIFF WBC: CPT | Performed by: STUDENT IN AN ORGANIZED HEALTH CARE EDUCATION/TRAINING PROGRAM

## 2024-06-08 PROCEDURE — 99900035 HC TECH TIME PER 15 MIN (STAT)

## 2024-06-08 PROCEDURE — 25000242 PHARM REV CODE 250 ALT 637 W/ HCPCS: Performed by: PHYSICIAN ASSISTANT

## 2024-06-08 PROCEDURE — 86706 HEP B SURFACE ANTIBODY: CPT | Mod: 91 | Performed by: STUDENT IN AN ORGANIZED HEALTH CARE EDUCATION/TRAINING PROGRAM

## 2024-06-08 PROCEDURE — 94761 N-INVAS EAR/PLS OXIMETRY MLT: CPT

## 2024-06-08 PROCEDURE — 99232 SBSQ HOSP IP/OBS MODERATE 35: CPT | Mod: ,,, | Performed by: INTERNAL MEDICINE

## 2024-06-08 PROCEDURE — 63600175 PHARM REV CODE 636 W HCPCS: Performed by: PHYSICIAN ASSISTANT

## 2024-06-08 PROCEDURE — 25000003 PHARM REV CODE 250: Performed by: STUDENT IN AN ORGANIZED HEALTH CARE EDUCATION/TRAINING PROGRAM

## 2024-06-08 PROCEDURE — 83735 ASSAY OF MAGNESIUM: CPT | Performed by: PHYSICIAN ASSISTANT

## 2024-06-08 PROCEDURE — 27000221 HC OXYGEN, UP TO 24 HOURS

## 2024-06-08 PROCEDURE — 84484 ASSAY OF TROPONIN QUANT: CPT | Performed by: STUDENT IN AN ORGANIZED HEALTH CARE EDUCATION/TRAINING PROGRAM

## 2024-06-08 PROCEDURE — 63600175 PHARM REV CODE 636 W HCPCS

## 2024-06-08 PROCEDURE — 25000003 PHARM REV CODE 250: Performed by: PHYSICIAN ASSISTANT

## 2024-06-08 PROCEDURE — 85730 THROMBOPLASTIN TIME PARTIAL: CPT | Mod: 91 | Performed by: STUDENT IN AN ORGANIZED HEALTH CARE EDUCATION/TRAINING PROGRAM

## 2024-06-08 PROCEDURE — 63600175 PHARM REV CODE 636 W HCPCS: Mod: JZ,JG | Performed by: STUDENT IN AN ORGANIZED HEALTH CARE EDUCATION/TRAINING PROGRAM

## 2024-06-08 PROCEDURE — 25000242 PHARM REV CODE 250 ALT 637 W/ HCPCS: Performed by: NURSE PRACTITIONER

## 2024-06-08 PROCEDURE — 86704 HEP B CORE ANTIBODY TOTAL: CPT | Performed by: STUDENT IN AN ORGANIZED HEALTH CARE EDUCATION/TRAINING PROGRAM

## 2024-06-08 PROCEDURE — 25000242 PHARM REV CODE 250 ALT 637 W/ HCPCS: Performed by: STUDENT IN AN ORGANIZED HEALTH CARE EDUCATION/TRAINING PROGRAM

## 2024-06-08 PROCEDURE — 25000003 PHARM REV CODE 250

## 2024-06-08 PROCEDURE — 25000003 PHARM REV CODE 250: Performed by: INTERNAL MEDICINE

## 2024-06-08 PROCEDURE — 80048 BASIC METABOLIC PNL TOTAL CA: CPT | Performed by: PHYSICIAN ASSISTANT

## 2024-06-08 PROCEDURE — 84100 ASSAY OF PHOSPHORUS: CPT | Performed by: PHYSICIAN ASSISTANT

## 2024-06-08 PROCEDURE — 63600175 PHARM REV CODE 636 W HCPCS: Performed by: STUDENT IN AN ORGANIZED HEALTH CARE EDUCATION/TRAINING PROGRAM

## 2024-06-08 PROCEDURE — 20600001 HC STEP DOWN PRIVATE ROOM

## 2024-06-08 PROCEDURE — 36415 COLL VENOUS BLD VENIPUNCTURE: CPT | Mod: XB | Performed by: STUDENT IN AN ORGANIZED HEALTH CARE EDUCATION/TRAINING PROGRAM

## 2024-06-08 PROCEDURE — 80100014 HC HEMODIALYSIS 1:1

## 2024-06-08 PROCEDURE — 90935 HEMODIALYSIS ONE EVALUATION: CPT | Mod: ,,, | Performed by: NURSE PRACTITIONER

## 2024-06-08 PROCEDURE — 63700000 PHARM REV CODE 250 ALT 637 W/O HCPCS: Performed by: PHYSICIAN ASSISTANT

## 2024-06-08 RX ORDER — ALBUTEROL SULFATE 2.5 MG/.5ML
2.5 SOLUTION RESPIRATORY (INHALATION) EVERY 6 HOURS PRN
Status: DISCONTINUED | OUTPATIENT
Start: 2024-06-08 | End: 2024-06-11

## 2024-06-08 RX ORDER — HYDRALAZINE HYDROCHLORIDE 50 MG/1
50 TABLET, FILM COATED ORAL ONCE
Status: COMPLETED | OUTPATIENT
Start: 2024-06-08 | End: 2024-06-08

## 2024-06-08 RX ORDER — SODIUM CHLORIDE 9 MG/ML
INJECTION, SOLUTION INTRAVENOUS ONCE
Status: CANCELLED | OUTPATIENT
Start: 2024-06-08 | End: 2024-06-08

## 2024-06-08 RX ORDER — HYDRALAZINE HYDROCHLORIDE 50 MG/1
50 TABLET, FILM COATED ORAL EVERY 6 HOURS
Status: DISCONTINUED | OUTPATIENT
Start: 2024-06-08 | End: 2024-06-08

## 2024-06-08 RX ORDER — GUAIFENESIN 600 MG/1
600 TABLET, EXTENDED RELEASE ORAL 2 TIMES DAILY
Status: DISCONTINUED | OUTPATIENT
Start: 2024-06-08 | End: 2024-06-11

## 2024-06-08 RX ORDER — HYDRALAZINE HYDROCHLORIDE 20 MG/ML
10 INJECTION INTRAMUSCULAR; INTRAVENOUS ONCE
Status: COMPLETED | OUTPATIENT
Start: 2024-06-08 | End: 2024-06-08

## 2024-06-08 RX ORDER — BUDESONIDE 0.5 MG/2ML
0.5 INHALANT ORAL EVERY 12 HOURS
Status: DISCONTINUED | OUTPATIENT
Start: 2024-06-08 | End: 2024-06-11

## 2024-06-08 RX ORDER — GUAIFENESIN 100 MG/5ML
200 SOLUTION ORAL EVERY 4 HOURS PRN
Status: DISCONTINUED | OUTPATIENT
Start: 2024-06-08 | End: 2024-06-12 | Stop reason: HOSPADM

## 2024-06-08 RX ORDER — LABETALOL HCL 20 MG/4 ML
10 SYRINGE (ML) INTRAVENOUS ONCE
Status: COMPLETED | OUTPATIENT
Start: 2024-06-08 | End: 2024-06-08

## 2024-06-08 RX ADMIN — CARVEDILOL 50 MG: 25 TABLET, FILM COATED ORAL at 11:06

## 2024-06-08 RX ADMIN — IPRATROPIUM BROMIDE AND ALBUTEROL SULFATE 3 ML: 2.5; .5 SOLUTION RESPIRATORY (INHALATION) at 07:06

## 2024-06-08 RX ADMIN — CARVEDILOL 50 MG: 25 TABLET, FILM COATED ORAL at 08:06

## 2024-06-08 RX ADMIN — SEVELAMER CARBONATE 800 MG: 800 TABLET, FILM COATED ORAL at 01:06

## 2024-06-08 RX ADMIN — HEPARIN SODIUM 16 UNITS/KG/HR: 10000 INJECTION, SOLUTION INTRAVENOUS at 02:06

## 2024-06-08 RX ADMIN — HEPARIN SODIUM 20 UNITS/KG/HR: 10000 INJECTION, SOLUTION INTRAVENOUS at 07:06

## 2024-06-08 RX ADMIN — HYDRALAZINE HYDROCHLORIDE 50 MG: 50 TABLET ORAL at 01:06

## 2024-06-08 RX ADMIN — BUDESONIDE INHALATION 0.5 MG: 0.5 SUSPENSION RESPIRATORY (INHALATION) at 07:06

## 2024-06-08 RX ADMIN — ATORVASTATIN CALCIUM 80 MG: 40 TABLET, FILM COATED ORAL at 09:06

## 2024-06-08 RX ADMIN — ASPIRIN 81 MG CHEWABLE TABLET 81 MG: 81 TABLET CHEWABLE at 09:06

## 2024-06-08 RX ADMIN — MUPIROCIN: 20 OINTMENT TOPICAL at 01:06

## 2024-06-08 RX ADMIN — HYDRALAZINE HYDROCHLORIDE 10 MG: 20 INJECTION, SOLUTION INTRAMUSCULAR; INTRAVENOUS at 09:06

## 2024-06-08 RX ADMIN — LABETALOL HYDROCHLORIDE 20 MG: 5 INJECTION, SOLUTION INTRAVENOUS at 12:06

## 2024-06-08 RX ADMIN — IPRATROPIUM BROMIDE AND ALBUTEROL SULFATE 3 ML: 2.5; .5 SOLUTION RESPIRATORY (INHALATION) at 01:06

## 2024-06-08 RX ADMIN — CLOPIDOGREL BISULFATE 75 MG: 75 TABLET ORAL at 09:06

## 2024-06-08 RX ADMIN — EZETIMIBE 10 MG: 10 TABLET ORAL at 09:06

## 2024-06-08 RX ADMIN — GUAIFENESIN 200 MG: 200 SOLUTION ORAL at 01:06

## 2024-06-08 RX ADMIN — NIFEDIPINE 90 MG: 60 TABLET, FILM COATED, EXTENDED RELEASE ORAL at 11:06

## 2024-06-08 RX ADMIN — SEVELAMER CARBONATE 800 MG: 800 TABLET, FILM COATED ORAL at 05:06

## 2024-06-08 RX ADMIN — HYDRALAZINE HYDROCHLORIDE 10 MG: 20 INJECTION, SOLUTION INTRAMUSCULAR; INTRAVENOUS at 04:06

## 2024-06-08 RX ADMIN — HYDRALAZINE HYDROCHLORIDE 75 MG: 50 TABLET ORAL at 05:06

## 2024-06-08 RX ADMIN — CEFTRIAXONE 1 G: 1 INJECTION, POWDER, FOR SOLUTION INTRAMUSCULAR; INTRAVENOUS at 01:06

## 2024-06-08 RX ADMIN — HEPARIN SODIUM 18 UNITS/KG/HR: 10000 INJECTION, SOLUTION INTRAVENOUS at 09:06

## 2024-06-08 RX ADMIN — AZITHROMYCIN DIHYDRATE 250 MG: 250 TABLET ORAL at 09:06

## 2024-06-08 RX ADMIN — ISOSORBIDE MONONITRATE 120 MG: 60 TABLET, EXTENDED RELEASE ORAL at 11:06

## 2024-06-08 RX ADMIN — GUAIFENESIN 600 MG: 600 TABLET, EXTENDED RELEASE ORAL at 09:06

## 2024-06-08 RX ADMIN — LABETALOL HYDROCHLORIDE 10 MG: 5 INJECTION, SOLUTION INTRAVENOUS at 03:06

## 2024-06-08 RX ADMIN — GUAIFENESIN 600 MG: 600 TABLET, EXTENDED RELEASE ORAL at 08:06

## 2024-06-08 RX ADMIN — MUPIROCIN 1 TUBE: 20 OINTMENT TOPICAL at 08:06

## 2024-06-08 RX ADMIN — HYDRALAZINE HYDROCHLORIDE 50 MG: 50 TABLET ORAL at 04:06

## 2024-06-08 RX ADMIN — HYDRALAZINE HYDROCHLORIDE 75 MG: 50 TABLET ORAL at 11:06

## 2024-06-08 NOTE — CARE UPDATE
RAPID RESPONSE NURSE FOLLOW-UP NOTE       Followed up with patient for  Charge call .  No acute issues at this time. Reviewed plan of care with Charge RNServando. HD ordered by primary team for uncontrolled HTN despite multiple PRN anti-hypertensives .   Team will continue to follow.  Please call Rapid Response RN, Montse Meyer RN with any questions or concerns at [phone].

## 2024-06-08 NOTE — PROGRESS NOTES
[facility] - Cardiology Stepdown  Cardiology  Progress Note    Patient Name: Haroldo Dickinson  MRN: [id number]  Admission Date: 6/7/2024  Hospital Length of Stay: 1 days  Code Status: Full Code   Attending Physician: Kassi Joseph MD   Primary Care Physician: Mireya Larose MD  Expected Discharge Date: 6/10/2024  Principal Problem:NSTEMI (non-ST elevated myocardial infarction)    Subjective:     Interval History: Patient hypertensive throughout the night with SBP in 180s-190s, requiring multiple PRN doses of antiHTNs. Oxygen requirement also increased overnight from 5L NC to 12L HF NC. Primary discussed with Nephrology and patient is seen this morning during a UF session. Patient reports cessation of chest pain while on nitro gtt, tolerating transition to oral anti-anginals well. Endorses dyspnea, but is ambulating well without lightheadedness, presyncope/syncope.    BP range 180-/100. HR range . Satting 97% on 12L HFNC. Labs notable for increase in WBC to 15.62 from 12.5. Troponin trended throughout yesterday from 0.312 > 9.682 > 12.08 > 9.65. Intake charted at 1772cc, Output charted at 2650cc (including UF from HD), Net -877cc.    Review of Systems   Constitutional: Negative for chills, decreased appetite, diaphoresis and fever.   HENT:  Negative for sore throat.    Eyes:  Negative for visual disturbance.   Cardiovascular:  Negative for chest pain, dyspnea on exertion, irregular heartbeat, leg swelling, near-syncope, orthopnea, palpitations, paroxysmal nocturnal dyspnea and syncope.   Respiratory:  Positive for shortness of breath. Negative for cough.    Gastrointestinal:  Negative for bloating, abdominal pain, nausea and vomiting.   Neurological:  Negative for headaches, light-headedness and weakness.     Objective:     Vital Signs (Most Recent):  Temp: 97.8 °F (36.6 °C) (06/08/24 0755)  Pulse: 88 (06/08/24 1045)  Resp: 17 (06/08/24 0755)  BP: (!) 182/94 (06/08/24 1045)  SpO2: 97 % (06/08/24 0403) Vital  Signs (24h Range):  Temp:  [97.8 °F (36.6 °C)-99.4 °F (37.4 °C)] 97.8 °F (36.6 °C)  Pulse:  [] 88  Resp:  [17-29] 17  SpO2:  [86 %-97 %] 97 %  BP: (153-199)/() 182/94     Weight: 74.3 kg (163 lb 12.8 oz)  Body mass index is 25.66 kg/m².     SpO2: 97 %         Intake/Output Summary (Last 24 hours) at 6/8/2024 1059  Last data filed at 6/8/2024 1042  Gross per 24 hour   Intake 1972.8 ml   Output 2650 ml   Net -677.2 ml       Lines/Drains/Airways       Peripheral Intravenous Line  Duration                  Hemodialysis AV Fistula Right forearm -- days         Peripheral IV - Single Lumen 06/07/24 0317 18 G Left Forearm 1 day         Peripheral IV - Single Lumen 06/07/24 0430 18 G Left Antecubital 1 day                       Physical Exam  Vitals and nursing note reviewed.   Constitutional:       General: He is not in acute distress.     Appearance: Normal appearance. He is not ill-appearing.   HENT:      Head: Normocephalic and atraumatic.      Mouth/Throat:      Mouth: Mucous membranes are moist.      Pharynx: Oropharynx is clear.   Eyes:      General: No scleral icterus.     Extraocular Movements: Extraocular movements intact.      Conjunctiva/sclera: Conjunctivae normal.   Cardiovascular:      Rate and Rhythm: Regular rhythm. Tachycardia present.      Pulses: Normal pulses.      Heart sounds: Normal heart sounds. No murmur heard.     No gallop.   Pulmonary:      Effort: Pulmonary effort is normal. No respiratory distress.      Breath sounds: Normal breath sounds. No wheezing, rhonchi or rales.   Abdominal:      General: Abdomen is flat. Bowel sounds are normal. There is no distension.      Palpations: Abdomen is soft.      Tenderness: There is no abdominal tenderness.   Musculoskeletal:      Right lower leg: Edema present.      Left lower leg: Edema present.   Skin:     General: Skin is warm and dry.   Neurological:      General: No focal deficit present.      Mental Status: He is alert and oriented to  person, place, and time. Mental status is at baseline.            Significant Labs: CMP   Recent Labs   Lab 06/07/24  0328 06/08/24  0552    132*   K 3.7 4.0    100   CO2 23 23    95   BUN 41* 28*   CREATININE 9.4* 7.4*   CALCIUM 10.6* 9.7   PROT 7.4  --    ALBUMIN 3.0*  --    BILITOT 0.7  --    ALKPHOS 72  --    AST 16  --    ALT 13  --    ANIONGAP 13 9   , CBC   Recent Labs   Lab 06/07/24  0328 06/07/24  0756 06/08/24  0552   WBC 12.19 12.51 15.62*   HGB 9.7* 9.3* 9.9*   HCT 31.1* 31.6* 31.3*    333 348   , Troponin   Recent Labs   Lab 06/07/24  1903 06/07/24  2259 06/08/24  0552   TROPONINI 0.032* 12.081* 9.656*   , and All pertinent lab results from the last 24 hours have been reviewed.    Significant Imaging:     EKG 6/7/24 - Sinus tachycardia without ischemic changes    TTE 6/7/24    Left Ventricle: The left ventricle is mildly dilated. Moderately increased ventricular mass. Mildly increased wall thickness. There is moderate concentric hypertrophy. Regional wall motion abnormalities present. See diagram for wall motion findings. There is normal diastolic function.    Right Ventricle: Normal right ventricular cavity size. Wall thickness is normal. Systolic function is normal.    Left Atrium: Left atrium is severely dilated.    Right Atrium: Normal and compressed right atrial size.    Aortic Valve: The aortic valve is a trileaflet valve. There is moderate aortic valve sclerosis. There is moderate annular calcification present.    Mitral Valve: There is moderate mitral annular calcification present. There is mild regurgitation.    Tricuspid Valve: There is mild regurgitation.    Aorta: Aortic root is normal in size measuring 3.68 cm. Ascending aorta is mildly dilated measuring 3.89 cm.    Pulmonary Artery: The estimated pulmonary artery systolic pressure is 25 mmHg.    IVC/SVC: Elevated venous pressure at 15 mmHg.    Pericardium: There is a trivial circumferential effusion.  Assessment  and Plan:     * NSTEMI (non-ST elevated myocardial infarction)  Mr. Dickinson is a 48 year old gentleman with PMH of HTN, HLD, h/o CVA, CAD, ESRD who presents to [facility] ED with chest pain. Given degree of troponin elevation (hx of elevation to 25 with prior NSTEMI), lack of ischemic change on EKG, his presentation is likely secondary to type II ischemia in the setting of elevated BP and volume overload. Patient has exhibited escalation in oxygen requirement and hypertension since admission. TTE consistent with priors with IVC of 15mmHg on the day of admission.     Recommendations  -Continue home aspirin and plavix  -Can continue heparin for the time being  -Continue home anti anginals: coreg 50 mg BID, nifedipine 90 mg daily, increased dose of imdur at 120 mg   -Discussed case with Dialysis team with the recommendation to pull more UF volume with this session if tolerated, hoping for 3-3.5L removed today. Otherwise, continued volume management per dialysis.  -Continued HTN management per primary (anticipate we will be able to de-escalate regimen with proper volume management).    Thank you for your consult. We will sign off. Please contact us with any questions or concerns.        VTE Risk Mitigation (From admission, onward)           Ordered     Reason for No Pharmacological VTE Prophylaxis  Once        Question:  Reasons:  Answer:  Physician Provided (leave comment)  Comment:  on heparin gtt    06/07/24 0931     IP VTE HIGH RISK PATIENT  Once         06/07/24 0931     heparin 25,000 units in dextrose 5% (100 units/ml) IV bolus from bag LOW INTENSITY nomogram - [facility]  As needed (PRN)        Question:  Heparin Infusion Adjustment (DO NOT MODIFY ANSWER)  Answer:  [email or web address]\epic\Images\Pharmacy\HeparinInfusions\heparin LOW INTENSITY nomogram for [facility] [id number]    06/07/24 0729     heparin 25,000 units in dextrose 5% (100 units/ml) IV bolus from bag LOW INTENSITY nomogram - [facility]  As needed (PRN)        Question:  Heparin  Infusion Adjustment (DO NOT MODIFY ANSWER)  Answer:  \\[email or web address]\epic\Images\Pharmacy\HeparinInfusions\heparin LOW INTENSITY nomogram for [facility] [id number].pdf    06/07/24 0729     heparin 25,000 units in dextrose 5% 250 mL (100 units/mL) infusion LOW INTENSITY nomogram - [facility]  Continuous        Question:  Begin at (units/kg/hr)  Answer:  12 06/07/24 0729                    Michael Coates MD  Cardiology  [facility] - Cardiology Stepdown

## 2024-06-08 NOTE — PLAN OF CARE
48M w/ PMHx of CAD (s/p stents), ESRD (on HD), T2DM, HTN, prior CVA (residual R-sided weakness), admitted this AM  w/ recurrent L-sided chest pain along w/ recent wet/nonproductive cough. Of note [facility] (Nov 2023) w/ 2 vessel dx & mult admissions for CP & NSTEMI, however anatomy apparently not amenable to revascularization so managed medically.     On arrival pt hypertensive & requiring 2L NC initially. Labs notable for elevated troponin w/ some nonspecific ST/T wave changes on EKG, along w/ notably elevated BNP. Chest imaging w/ some e/o volume overload & poss superimposed PNA. Started on heparin ggt & abx. Rapid increase in O2 needs (2L --> 10L HFNC while in ED) w/ uptrending troponin up to 9.682 (although w/ high likelihood of contributing poor renal clearance given ESRD/anuria). Reassured as pt without chest pain or SOB since arrival, and EKG remained stable. Got HD w/ removal of 2L in ED.  Following HD, pt still requiring 10L HFNC w/ notably diminished breath sounds on exam (similar to prior), but no e/o respiratory distress (no SOB, accessory muscle use, increased WOB, etc). Troponin following HD down to 0.032 & EKG remained stable. Pt evaluated at bedside multiple times and clinically stable on exam/appearance/ROS.     Discussed with Cardiology (now formally consulted) who reviewed case & EKGs, and favored demand troponinemia 2/2 volume overload & PNA. Out of precaution, advised continuing w/ ACS pathway, but focusing on mgmt of volume, BP & PNA. Cards following, but also advised notifying them if pt has recurrent chest pain unresponsive to NTG. In addition to consultation of Cardiology & Nephrology teams, RRT & ICU team made aware of pt should respiratory status further decline. Two PRN BP meds available for goal SBP < 160. Continuing with abx. No need to continue trending troponin. Night team made aware.

## 2024-06-08 NOTE — PROGRESS NOTES
[facility]     Patient currently on hemodialysis for removal of uremic toxins and volume.     Patient seen and evaluated on hemodialysis, tolerating treatment, see HD flowsheet for vitals and assessments.    Labs have been reviewed and the dialysate bath has been adjusted.       Assessment/Plan:    -Elevated troponin. Cardiology following believes demand ischemia as etiology   -Hypertensive SBP 190s. Increased O2 requirements overnight now on 12L HFNC. CXR with evidence of pulm edema.   -HD on 6/7 with net UF 2L, tolerated well.   -UF only today, will increase to 4 hours, UF goal 3.5L. Seen on HD, tolerating well.   -Plan for next on 6/10, unless emergent need arises   -Renal diet, if not NPO   -Strict I/O's and daily weights  -Daily renal function panels  -Keep MAP >65 while on HD   -Will continue to follow while inpatient     Gianna Watkins DNP-CARLP, C  Nephrology  Pager: [phone]

## 2024-06-08 NOTE — PLAN OF CARE
[facility] - Cardiology Stepdown  Initial Discharge Assessment       Primary Care Provider: Mireya Larose MD    Admission Diagnosis: Elevated troponin [R79.89]  NSTEMI (non-ST elevated myocardial infarction) [I21.4]  Chest pain [R07.9]    Admission Date: 6/7/2024  Expected Discharge Date: 6/10/2024    Transition of Care Barriers: (P) None    Payor: [employer] MANAGED MEDICARE / Plan: HU[employer] SNP HMO PPO SPECIAL NEEDS / Product Type: Medicare Advantage /     Extended Emergency Contact Information  Primary Emergency Contact: Gabby Dickinson  Address: [address] [place] [address] [place]  Mobile Phone: [phone]  Relation: Mother    Discharge Plan A: (P) Home  Discharge Plan B: (P) Home with family      [id number] [place], [place] - [address]  [address] [address]  Phone: [phone] Fax: [phone]      Initial Assessment (most recent)       Adult Discharge Assessment - 06/08/24 1524          Discharge Assessment    Assessment Type Discharge Planning Assessment (P)      Confirmed/corrected address, phone number and insurance Yes (P)      Confirmed Demographics Correct on Facesheet (P)      Source of Information patient (P)      When was your last doctors appointment? -- (P)    1 month ago.    Communicated QUIN with patient/caregiver Date not available/Unable to determine (P)      Reason For Admission NSTEMI (P)      People in Home alone (P)      Do you expect to return to your current living situation? Yes (P)      Do you have help at home or someone to help you manage your care at home? Yes (P)      Who are your caregiver(s) and their phone number(s)? Gabby Heath [phone] (P)      Prior to hospitilization cognitive status: Alert/Oriented (P)      Current cognitive status: Alert/Oriented (P)      Walking or Climbing Stairs Difficulty no (P)      Dressing/Bathing Difficulty no (P)      Equipment Currently Used at  Home none (P)      Readmission within 30 days? No (P)      Patient currently being followed by outpatient case management? No (P)      Do you currently have service(s) that help you manage your care at home? No (P)      Do you take prescription medications? Yes (P)      Do you have prescription coverage? Yes (P)      Coverage [employer] Medicare (P)      Do you have any problems affording any of your prescribed medications? No (P)      Is the patient taking medications as prescribed? yes (P)      Who is going to help you get home at discharge? Mother (P)      How do you get to doctors appointments? health plan transportation (P)      Are you on dialysis? Yes (P)      Dialysis Name and Scheduled days [facility] 2nd Shift (P)      Do you take coumadin? No (P)      Discharge Plan A Home (P)      Discharge Plan B Home with family (P)      DME Needed Upon Discharge  none (P)      Discharge Plan discussed with: Patient;Parent(s) (P)      Name(s) and Number(s) Mother, Gabby Dickinson [phone] (P)      Transition of Care Barriers None (P)         Physical Activity    On average, how many days per week do you engage in moderate to strenuous exercise (like a brisk walk)? 0 days (P)      On average, how many minutes do you engage in exercise at this level? 0 min (P)         Financial Resource Strain    How hard is it for you to pay for the very basics like food, housing, medical care, and heating? Not very hard (P)         Housing Stability    In the last 12 months, was there a time when you were not able to pay the mortgage or rent on time? No (P)      At any time in the past 12 months, were you homeless or living in a shelter (including now)? No (P)         Transportation Needs    Has the lack of transportation kept you from medical appointments, meetings, work or from getting things needed for daily living? No (P)         Food Insecurity    Within the past 12 months, you worried that your food  would run out before you got the money to buy more. Never true (P)      Within the past 12 months, the food you bought just didn't last and you didn't have money to get more. Never true (P)         Stress    Do you feel stress - tense, restless, nervous, or anxious, or unable to sleep at night because your mind is troubled all the time - these days? Not at all (P)         Social Isolation    How often do you feel lonely or isolated from those around you?  Sometimes (P)         Alcohol Use    Q1: How often do you have a drink containing alcohol? Never (P)      Q2: How many drinks containing alcohol do you have on a typical day when you are drinking? Patient does not drink (P)      Q3: How often do you have six or more drinks on one occasion? Never (P)         Utilities    In the past 12 months has the electric, gas, oil, or water company threatened to shut off services in your home? No (P)         Health Literacy    How often do you need to have someone help you when you read instructions, pamphlets, or other written material from your doctor or pharmacy? Rarely (P)                    SW met with pt and his mother, Gabby at bedside to complete Initial Discharge Planning Assessment. Pt. is  and lives alone in his apartment in [place], [place]. No DME, Home Health or Coumadin. Pt. attends outpatient dialysis treatments at [facility] 2nd shift. Pt. utilizes Medicaid transportation to and from dialysis & appointments. Pt. received dialysis treatment today. Pt. will need transportation home upon discharge.     Discharge Plan A and Plan B have been determined by review of patient's clinical status, future medical and therapeutic needs, and coverage/benefits for post-acute care in coordination with multidisciplinary team members.     Morgan Mitchell LMSW

## 2024-06-08 NOTE — HPI
Mr Haroldo Dickinson is a 48 year old gentleman with PMH of HTN, HLD, h/o CVA, CAD, ESRD who presents to [facility] ED with chest pain. He has known CAD and underwent LHC 11/2023 with 2 vessel disease (90% LAD and  of RCA). He has frequent admissions for chest pain and NSTEMI. Anatomy is apparently not amenable to revascularization so medical therapy has been recommended. Historically EF has been preserved.      He reports when he went to dialysis 2 days ago they incorrectly recorded his weight and he did not undergo any ultrafiltration. He left feeling like he had extra fluid on him. He thought he would be ok to wait until next dialysis sessions (which would be today) however he developed significant chest discomfort last night prompting his presentation to the ED. He received nitroglycerin en route which improved his pain. On presentation to ED his BP was markedly elevated. Troponin was mildly elevated at 0.3 (was 25 during last admit for NSTEMI) BNP up at 1500, and EKG was without ischemic changes. He was started on heparin drip and nitroglycerin drip. He reports being chest pain free currently.     On arrival to ED, pt afebrile, hypertensive (SBP 160s-180s), mildly tachycardic (HR 90s-100s), & mildly tachypneic (RR 18-22) w/ SpO2 88% on RA (placed on 2L NC w/ improvement). Labs notable for D-dimer 4.14, BNP 1542, troponin 0.161, WBC 12.2, Hgb 9.7, Cr 9.4, & BUN 41. CTA negative for PE but w/ b/l ground-glass airspace opacities s/o pulmonary edema w/ possible superimposed PNA. Received SL NTG 0.4mg, IV dilaudid 0.5mg, & IV Zofran 4mg. Repeat troponin uptrending to 0.312. Pt w/o pain. Started on heparin & NTG ggts. EKG w/ nonspecific ST/T wave changes. Reviewed by cardiology w/ low suspicion for ACS etiology. Received home Imdur 120mg, nifedipine 90mg, carvedilol 50mg, ASA 81mg, Plavix 75mg, & atorvastatin 80mg. NTG ggt d/c'ed. Also started on ceftriaxone & azithromycin. Pt admitted to Hospital Medicine service for  further workup & mgmt of AHRF 2/2 CHF exacerbation w/ possible PNA.      Rapid & dramatic uptrend in troponin (0.312 --> 3.074 within 4hrs). EKG stable & pt w/o active chest pain. Rapid increase in O2 needs as well (2L NC --> 10L HFNC while still in ED). Notably diminished breath sounds on eam, but luckily no respiratory distress, increased WOB or SOB (just dry cough); see Plan of Care note for details. Underwent bedside HD in ED w/ removal of 2L, although pt remained on 10L HFNC even w/ volume removal. Troponin peaked at 12. Pt requiring up to 12L HFNC the following morning. BP regimen escalated (increased scheduled hydralazine). Pt had addn'l run of HD on 6/8 w/ removal of 3.5L and subsequent decreased O2 needs (down to 6L HFNC the following AM). Continued downtrend in O2 needs w/ HD & improved BP control.      Had HD yesterday w/ removal of 3L. Continued decrease in supplemental O2 needs (down to 2L) this AM & notably improved respiratory exam. Improved BP control, although hypertensive during HD yesterday, and remains still above goal (although not alarmingly so). Minoxidil initiated

## 2024-06-08 NOTE — CARE UPDATE
RAPID RESPONSE NURSE PROACTIVE ROUNDING NOTE       Time of Visit:     Admit Date: 2024  LOS: 0  Code Status: Full Code   Date of Visit: 2024  : 1975  Age: 48 y.o.  Sex: male  Race: Black or   Bed: 307/307 A:   MRN: [id number]  Was the patient discharged from an ICU this admission? No   Was the patient discharged from a PACU within last 24 hours? No   Did the patient receive conscious sedation/general anesthesia in last 24 hours? No  Was the patient in the ED within the past 24 hours? Yes  Was the patient on NIPPV within the past 24 hours? No   Attending Physician: Kassi Joseph MD  Primary Service: [facility]    Time spent at the bedside: 30 - 45 min    SITUATION    Notified by charge RN via phone call.  Reason for alert: Increasing O2 demands  Called to evaluate the patient for Respiratory    BACKGROUND     Why is the patient in the hospital?: NSTEMI (non-ST elevated myocardial infarction)    Patient has a past medical history of CAD (coronary artery disease), native coronary artery, Cataract, Diabetes mellitus, Diabetic retinopathy, Dialysis patient, DM type 2 causing renal disease, not at goal, ESRD (end stage renal disease) started dialysis 2014, History of colon polyps, History of COVID-19, Hyperparathyroidism, secondary renal, Hypertension, NSTEMI (non-ST elevated myocardial infarction), Organ transplant candidate, Pneumonia, Post PTCA, Renal cell carcinoma of right kidney, Renal hypertension, and Stroke.    Last Vitals:  Temp: 98.3 °F (36.8 °C) (2321)  Pulse: 82 (2318)  Resp: 19 (1915)  BP: 180/93 (2318)  SpO2: 95 % (2318)    24 Hours Vitals Range:  Temp:  [97.8 °F (36.6 °C)-98.6 °F (37 °C)]   Pulse:  []   Resp:  [16-29]   BP: (153-191)/()   SpO2:  [86 %-99 %]     Labs:  Recent Labs     24  0000 24  0328 24  0756   WBC  --  12.19 12.51   HGB 9.4* 9.7* 9.3*   HCT  --  31.1* 31.6*   PLT  --  311 333        Recent Labs     06/07/24  0328      K 3.7      CO2 23   BUN 41*   CREATININE 9.4*           Recent Labs     06/07/24  0358   PH 7.393   PCO2 46.6*   PO2 51   HCO3 28.4*   POCSATURATED 85   BE 3*        ASSESSMENT     Physical Exam  Pulmonary:      Effort: Tachypnea present.      Breath sounds: Decreased air movement present. Wheezing and rhonchi present.     Pt states that his dry wt is 74.4 kg. Pt placed on standing scale at was 74.3 kg.  Repeat of his chest xray showed worsening generalized pulmonary edema with increased RV silhouette. Pt states that he usually has 2-3 liters removed with his HD sessions, but tonight only 2 L were removed.  His lungs sound wet bilaterally, with scattered wheezes.  Also questionable Troponin trend 9.682 to 0.032. Pt has a long history of NSTEMI and has never cleared troponin at this rate. Will send additional Trop to verify. PT with known multi vessel dx: 90% LAD,  of RCA, may be a component of cardiogenic shock. Pt with slight increased WOB but no respiratory distress. O2 sat 95% 10 L HF    INTERVENTIONS    The patient was seen for Respiratory problem. Staff concerns included increased WOB and increased oxygen requirements. The following interventions were performed: supplemental oxygen, portable chest x-ray, continuous pulse ox monitoring, and continuous cardiac monitoring continued.    RECOMMENDATIONS    Additional HD session ASAP in the am.  Symptom control with antitussives and antihypertensives, Duo nebs q 4 PRN. Pt has low threshold for ICU upgrade    PROVIDER ESCALATION    Yes/No  Yes    Orders received and case discussed with  ESPERANZA Liu.    Disposition: Remain in room 307.    FOLLOW-UP    Bedside RN, Sharmila Al Charge RN  updated on plan of care. Instructed to call the Rapid Response Nurse, Lucinda Grier RN at [phone] for additional questions or concerns.

## 2024-06-08 NOTE — PLAN OF CARE
Problem: Adult Inpatient Plan of Care  Goal: Plan of Care Review  Outcome: Progressing  Goal: Patient-Specific Goal (Individualized)  Outcome: Progressing  Goal: Absence of Hospital-Acquired Illness or Injury  Outcome: Progressing  Goal: Optimal Comfort and Wellbeing  Outcome: Progressing  Goal: Readiness for Transition of Care  Outcome: Progressing     Problem: Infection  Goal: Absence of Infection Signs and Symptoms  Outcome: Progressing     Problem: Hemodialysis  Goal: Safe, Effective Therapy Delivery  Outcome: Progressing  Goal: Effective Tissue Perfusion  Outcome: Progressing  Goal: Absence of Infection Signs and Symptoms  Outcome: Progressing     Problem: Diabetes Comorbidity  Goal: Blood Glucose Level Within Targeted Range  Outcome: Progressing     Problem: Fall Injury Risk  Goal: Absence of Fall and Fall-Related Injury  Outcome: Progressing

## 2024-06-08 NOTE — PROGRESS NOTES
Notified by primary team if patient with increased O2 requirements now on 10L HF. CXR per my review with evidence of pulm edema. Prior HD session complete on 06/07 with ~2L of UF performed. Per HD nurse note tolerated well. Trop ~12. Echo on 06/07 without evidence of rat heart strain. Cards on board per primary team still believe demand ischemia as etiology. Patient with Hx of complex CAD with proven obstructions in past. Patient remains with uncontrolled htn ~190s/90s.    In setting of suspected volume overload and increased o2 requirements will plan for UF only session for 2-2.5L UF as BP allows. Orders placed, HD nurse made aware.

## 2024-06-08 NOTE — PLAN OF CARE
Problem: Hemodialysis  Goal: Safe, Effective Therapy Delivery  Outcome: Progressing  Goal: Effective Tissue Perfusion  Outcome: Progressing  Goal: Absence of Infection Signs and Symptoms  Outcome: Progressing       UF only Dialysis tx ended to the right arm AVF, hemostasis achieved.    Net fluid removed: 3.5L    Report given to nurse Otero

## 2024-06-08 NOTE — SUBJECTIVE & OBJECTIVE
Interval History: Patient hypertensive throughout the night with SBP in 180s-190s, requiring multiple PRN doses of antiHTNs. Oxygen requirement also increased overnight from 5L NC to 12L HF NC. Primary discussed with Nephrology and patient is seen this morning during a UF session. Patient reports cessation of chest pain while on nitro gtt, tolerating transition to oral anti-anginals well. Endorses dyspnea, but is ambulating well without lightheadedness, presyncope/syncope.    BP range 180-/100. HR range . Satting 97% on 12L HFNC. Labs notable for increase in WBC to 15.62 from 12.5. Troponin trended throughout yesterday from 0.312 > 9.682 > 12.08 > 9.65. Intake charted at 1772cc, Output charted at 2650cc (including UF from HD), Net -877cc.    Review of Systems   Constitutional: Negative for chills, decreased appetite, diaphoresis and fever.   HENT:  Negative for sore throat.    Eyes:  Negative for visual disturbance.   Cardiovascular:  Negative for chest pain, dyspnea on exertion, irregular heartbeat, leg swelling, near-syncope, orthopnea, palpitations, paroxysmal nocturnal dyspnea and syncope.   Respiratory:  Positive for shortness of breath. Negative for cough.    Gastrointestinal:  Negative for bloating, abdominal pain, nausea and vomiting.   Neurological:  Negative for headaches, light-headedness and weakness.     Objective:     Vital Signs (Most Recent):  Temp: 97.8 °F (36.6 °C) (06/08/24 0755)  Pulse: 88 (06/08/24 1045)  Resp: 17 (06/08/24 0755)  BP: (!) 182/94 (06/08/24 1045)  SpO2: 97 % (06/08/24 0403) Vital Signs (24h Range):  Temp:  [97.8 °F (36.6 °C)-99.4 °F (37.4 °C)] 97.8 °F (36.6 °C)  Pulse:  [] 88  Resp:  [17-29] 17  SpO2:  [86 %-97 %] 97 %  BP: (153-199)/() 182/94     Weight: 74.3 kg (163 lb 12.8 oz)  Body mass index is 25.66 kg/m².     SpO2: 97 %         Intake/Output Summary (Last 24 hours) at 6/8/2024 1059  Last data filed at 6/8/2024 1042  Gross per 24 hour   Intake 1972.8  ml   Output 2650 ml   Net -677.2 ml       Lines/Drains/Airways       Peripheral Intravenous Line  Duration                  Hemodialysis AV Fistula Right forearm -- days         Peripheral IV - Single Lumen 06/07/24 0317 18 G Left Forearm 1 day         Peripheral IV - Single Lumen 06/07/24 0430 18 G Left Antecubital 1 day                       Physical Exam  Vitals and nursing note reviewed.   Constitutional:       General: He is not in acute distress.     Appearance: Normal appearance. He is not ill-appearing.   HENT:      Head: Normocephalic and atraumatic.      Mouth/Throat:      Mouth: Mucous membranes are moist.      Pharynx: Oropharynx is clear.   Eyes:      General: No scleral icterus.     Extraocular Movements: Extraocular movements intact.      Conjunctiva/sclera: Conjunctivae normal.   Cardiovascular:      Rate and Rhythm: Regular rhythm. Tachycardia present.      Pulses: Normal pulses.      Heart sounds: Normal heart sounds. No murmur heard.     No gallop.   Pulmonary:      Effort: Pulmonary effort is normal. No respiratory distress.      Breath sounds: Normal breath sounds. No wheezing, rhonchi or rales.   Abdominal:      General: Abdomen is flat. Bowel sounds are normal. There is no distension.      Palpations: Abdomen is soft.      Tenderness: There is no abdominal tenderness.   Musculoskeletal:      Right lower leg: Edema present.      Left lower leg: Edema present.   Skin:     General: Skin is warm and dry.   Neurological:      General: No focal deficit present.      Mental Status: He is alert and oriented to person, place, and time. Mental status is at baseline.            Significant Labs: CMP   Recent Labs   Lab 06/07/24  0328 06/08/24  0552    132*   K 3.7 4.0    100   CO2 23 23    95   BUN 41* 28*   CREATININE 9.4* 7.4*   CALCIUM 10.6* 9.7   PROT 7.4  --    ALBUMIN 3.0*  --    BILITOT 0.7  --    ALKPHOS 72  --    AST 16  --    ALT 13  --    ANIONGAP 13 9   , CBC   Recent Labs    Lab 06/07/24  0328 06/07/24  0756 06/08/24  0552   WBC 12.19 12.51 15.62*   HGB 9.7* 9.3* 9.9*   HCT 31.1* 31.6* 31.3*    333 348   , Troponin   Recent Labs   Lab 06/07/24  1903 06/07/24  2259 06/08/24  0552   TROPONINI 0.032* 12.081* 9.656*   , and All pertinent lab results from the last 24 hours have been reviewed.    Significant Imaging:     EKG 6/7/24 - Sinus tachycardia without ischemic changes    TTE 6/7/24    Left Ventricle: The left ventricle is mildly dilated. Moderately increased ventricular mass. Mildly increased wall thickness. There is moderate concentric hypertrophy. Regional wall motion abnormalities present. See diagram for wall motion findings. There is normal diastolic function.    Right Ventricle: Normal right ventricular cavity size. Wall thickness is normal. Systolic function is normal.    Left Atrium: Left atrium is severely dilated.    Right Atrium: Normal and compressed right atrial size.    Aortic Valve: The aortic valve is a trileaflet valve. There is moderate aortic valve sclerosis. There is moderate annular calcification present.    Mitral Valve: There is moderate mitral annular calcification present. There is mild regurgitation.    Tricuspid Valve: There is mild regurgitation.    Aorta: Aortic root is normal in size measuring 3.68 cm. Ascending aorta is mildly dilated measuring 3.89 cm.    Pulmonary Artery: The estimated pulmonary artery systolic pressure is 25 mmHg.    IVC/SVC: Elevated venous pressure at 15 mmHg.    Pericardium: There is a trivial circumferential effusion.

## 2024-06-08 NOTE — NURSING
"Bedside UF only dialysis tx started to the right arm AVF per orders placed by Dr. Houston;      Ultra-filtration only If "per protocol" is selected for one or more ingredients (K+, Ca++, Na+, Bicarb) for the dialysate bath solution, select the hyperlink for the protocol instructions.: Patient Communication     Not Released  Not seen     Order Questions    Question Answer   Duration of Treatment 3 hours   Dialyzer F160   Dialysate Temperature (C) 37   Target  mL/min   If unable to maintain flow due to inadequate vascular access patency, patient intolerance (i.e. chest pain, access discomfort) or elevated venous pressure, adjust blood flow rate to a minimum of _____mL/min 100   K+ Potassium per Protocol   Ca++ Calcium per Protocol   Na+ Sodium per Protocol   Bicarb Bicarbonate per Protocol   Access to be used AVF   Needle gauge 16 gauge   Location Arm   Laterality Right   Target UF 2.5L   If unable to maintain this UFR due to patient intolerance (i.e. hypotension, chest pain, muscle cramping, nausea or vomiting), adjust UFR to achieve a minimum of _______ liters of UF 0   Fluid Removal Instructions maintain SBP > 90 mmHG     "

## 2024-06-09 LAB
ALBUMIN SERPL BCP-MCNC: 2.5 G/DL (ref 3.5–5.2)
ANION GAP SERPL CALC-SCNC: 15 MMOL/L (ref 8–16)
APTT PPP: 34.9 SEC (ref 21–32)
APTT PPP: 37 SEC (ref 21–32)
APTT PPP: 45.3 SEC (ref 21–32)
APTT PPP: 48.1 SEC (ref 21–32)
APTT PPP: 57.3 SEC (ref 21–32)
BUN SERPL-MCNC: 47 MG/DL (ref 6–20)
CALCIUM SERPL-MCNC: 10.4 MG/DL (ref 8.7–10.5)
CHLORIDE SERPL-SCNC: 98 MMOL/L (ref 95–110)
CO2 SERPL-SCNC: 19 MMOL/L (ref 23–29)
CREAT SERPL-MCNC: 10.5 MG/DL (ref 0.5–1.4)
ERYTHROCYTE [DISTWIDTH] IN BLOOD BY AUTOMATED COUNT: 15.5 % (ref 11.5–14.5)
EST. GFR  (NO RACE VARIABLE): 5.6 ML/MIN/1.73 M^2
GLUCOSE SERPL-MCNC: 80 MG/DL (ref 70–110)
HCT VFR BLD AUTO: 30.2 % (ref 40–54)
HGB BLD-MCNC: 9.4 G/DL (ref 14–18)
MAGNESIUM SERPL-MCNC: 2.1 MG/DL (ref 1.6–2.6)
MCH RBC QN AUTO: 29.4 PG (ref 27–31)
MCHC RBC AUTO-ENTMCNC: 31.1 G/DL (ref 32–36)
MCV RBC AUTO: 94 FL (ref 82–98)
PHOSPHATE SERPL-MCNC: 5.7 MG/DL (ref 2.7–4.5)
PLATELET # BLD AUTO: 360 K/UL (ref 150–450)
PMV BLD AUTO: 10.3 FL (ref 9.2–12.9)
POTASSIUM SERPL-SCNC: 4.3 MMOL/L (ref 3.5–5.1)
RBC # BLD AUTO: 3.2 M/UL (ref 4.6–6.2)
SODIUM SERPL-SCNC: 132 MMOL/L (ref 136–145)
WBC # BLD AUTO: 13.49 K/UL (ref 3.9–12.7)

## 2024-06-09 PROCEDURE — 94799 UNLISTED PULMONARY SVC/PX: CPT

## 2024-06-09 PROCEDURE — 63600175 PHARM REV CODE 636 W HCPCS: Performed by: PHYSICIAN ASSISTANT

## 2024-06-09 PROCEDURE — 85027 COMPLETE CBC AUTOMATED: CPT | Performed by: STUDENT IN AN ORGANIZED HEALTH CARE EDUCATION/TRAINING PROGRAM

## 2024-06-09 PROCEDURE — 99900035 HC TECH TIME PER 15 MIN (STAT)

## 2024-06-09 PROCEDURE — 80069 RENAL FUNCTION PANEL: CPT | Performed by: STUDENT IN AN ORGANIZED HEALTH CARE EDUCATION/TRAINING PROGRAM

## 2024-06-09 PROCEDURE — 63700000 PHARM REV CODE 250 ALT 637 W/O HCPCS: Performed by: PHYSICIAN ASSISTANT

## 2024-06-09 PROCEDURE — 25000003 PHARM REV CODE 250: Performed by: STUDENT IN AN ORGANIZED HEALTH CARE EDUCATION/TRAINING PROGRAM

## 2024-06-09 PROCEDURE — 27000221 HC OXYGEN, UP TO 24 HOURS

## 2024-06-09 PROCEDURE — 63600175 PHARM REV CODE 636 W HCPCS: Performed by: STUDENT IN AN ORGANIZED HEALTH CARE EDUCATION/TRAINING PROGRAM

## 2024-06-09 PROCEDURE — 94761 N-INVAS EAR/PLS OXIMETRY MLT: CPT

## 2024-06-09 PROCEDURE — 36415 COLL VENOUS BLD VENIPUNCTURE: CPT | Performed by: STUDENT IN AN ORGANIZED HEALTH CARE EDUCATION/TRAINING PROGRAM

## 2024-06-09 PROCEDURE — 20600001 HC STEP DOWN PRIVATE ROOM

## 2024-06-09 PROCEDURE — 25000003 PHARM REV CODE 250: Performed by: PHYSICIAN ASSISTANT

## 2024-06-09 PROCEDURE — 83735 ASSAY OF MAGNESIUM: CPT | Performed by: STUDENT IN AN ORGANIZED HEALTH CARE EDUCATION/TRAINING PROGRAM

## 2024-06-09 PROCEDURE — 94640 AIRWAY INHALATION TREATMENT: CPT

## 2024-06-09 PROCEDURE — 27000646 HC AEROBIKA DEVICE

## 2024-06-09 PROCEDURE — 25000003 PHARM REV CODE 250: Performed by: INTERNAL MEDICINE

## 2024-06-09 PROCEDURE — 25000242 PHARM REV CODE 250 ALT 637 W/ HCPCS: Performed by: STUDENT IN AN ORGANIZED HEALTH CARE EDUCATION/TRAINING PROGRAM

## 2024-06-09 PROCEDURE — 94664 DEMO&/EVAL PT USE INHALER: CPT

## 2024-06-09 PROCEDURE — 25000242 PHARM REV CODE 250 ALT 637 W/ HCPCS: Performed by: NURSE PRACTITIONER

## 2024-06-09 PROCEDURE — 85730 THROMBOPLASTIN TIME PARTIAL: CPT | Mod: 91 | Performed by: STUDENT IN AN ORGANIZED HEALTH CARE EDUCATION/TRAINING PROGRAM

## 2024-06-09 RX ORDER — CINACALCET 30 MG/1
30 TABLET, FILM COATED ORAL
Status: DISCONTINUED | OUTPATIENT
Start: 2024-06-10 | End: 2024-06-12 | Stop reason: HOSPADM

## 2024-06-09 RX ADMIN — BUDESONIDE INHALATION 0.5 MG: 0.5 SUSPENSION RESPIRATORY (INHALATION) at 07:06

## 2024-06-09 RX ADMIN — AZITHROMYCIN DIHYDRATE 250 MG: 250 TABLET ORAL at 09:06

## 2024-06-09 RX ADMIN — IPRATROPIUM BROMIDE AND ALBUTEROL SULFATE 3 ML: 2.5; .5 SOLUTION RESPIRATORY (INHALATION) at 08:06

## 2024-06-09 RX ADMIN — ISOSORBIDE MONONITRATE 120 MG: 60 TABLET, EXTENDED RELEASE ORAL at 09:06

## 2024-06-09 RX ADMIN — HYDRALAZINE HYDROCHLORIDE 75 MG: 50 TABLET ORAL at 05:06

## 2024-06-09 RX ADMIN — HYDRALAZINE HYDROCHLORIDE 75 MG: 50 TABLET ORAL at 01:06

## 2024-06-09 RX ADMIN — MUPIROCIN: 20 OINTMENT TOPICAL at 09:06

## 2024-06-09 RX ADMIN — IPRATROPIUM BROMIDE AND ALBUTEROL SULFATE 3 ML: 2.5; .5 SOLUTION RESPIRATORY (INHALATION) at 07:06

## 2024-06-09 RX ADMIN — BUDESONIDE INHALATION 0.5 MG: 0.5 SUSPENSION RESPIRATORY (INHALATION) at 08:06

## 2024-06-09 RX ADMIN — GUAIFENESIN 600 MG: 600 TABLET, EXTENDED RELEASE ORAL at 08:06

## 2024-06-09 RX ADMIN — CARVEDILOL 50 MG: 25 TABLET, FILM COATED ORAL at 09:06

## 2024-06-09 RX ADMIN — CEFTRIAXONE 1 G: 1 INJECTION, POWDER, FOR SOLUTION INTRAMUSCULAR; INTRAVENOUS at 11:06

## 2024-06-09 RX ADMIN — EZETIMIBE 10 MG: 10 TABLET ORAL at 09:06

## 2024-06-09 RX ADMIN — HEPARIN SODIUM 22 UNITS/KG/HR: 10000 INJECTION, SOLUTION INTRAVENOUS at 01:06

## 2024-06-09 RX ADMIN — HEPARIN SODIUM 22 UNITS/KG/HR: 10000 INJECTION, SOLUTION INTRAVENOUS at 12:06

## 2024-06-09 RX ADMIN — SEVELAMER CARBONATE 800 MG: 800 TABLET, FILM COATED ORAL at 01:06

## 2024-06-09 RX ADMIN — ASPIRIN 81 MG CHEWABLE TABLET 81 MG: 81 TABLET CHEWABLE at 09:06

## 2024-06-09 RX ADMIN — GUAIFENESIN 600 MG: 600 TABLET, EXTENDED RELEASE ORAL at 09:06

## 2024-06-09 RX ADMIN — SEVELAMER CARBONATE 800 MG: 800 TABLET, FILM COATED ORAL at 05:06

## 2024-06-09 RX ADMIN — HYDRALAZINE HYDROCHLORIDE 75 MG: 50 TABLET ORAL at 11:06

## 2024-06-09 RX ADMIN — SEVELAMER CARBONATE 800 MG: 800 TABLET, FILM COATED ORAL at 09:06

## 2024-06-09 RX ADMIN — CARVEDILOL 50 MG: 25 TABLET, FILM COATED ORAL at 08:06

## 2024-06-09 RX ADMIN — MUPIROCIN: 20 OINTMENT TOPICAL at 08:06

## 2024-06-09 RX ADMIN — ATORVASTATIN CALCIUM 80 MG: 40 TABLET, FILM COATED ORAL at 09:06

## 2024-06-09 RX ADMIN — IPRATROPIUM BROMIDE AND ALBUTEROL SULFATE 3 ML: 2.5; .5 SOLUTION RESPIRATORY (INHALATION) at 01:06

## 2024-06-09 RX ADMIN — CLOPIDOGREL BISULFATE 75 MG: 75 TABLET ORAL at 09:06

## 2024-06-09 RX ADMIN — NIFEDIPINE 90 MG: 60 TABLET, FILM COATED, EXTENDED RELEASE ORAL at 09:06

## 2024-06-09 NOTE — SUBJECTIVE & OBJECTIVE
Interval History: Notable increase in O2 needs following admission (see POC note for details). Pt remains on 10L HFNC this AM w/ nonproductive cough, but otherwise asymptomatic & w/o SOB. Planning for addn'l HD today for addn'l volume removal per cards recs. Adding ARB for GDMT and increasing hydralazine for improved BP control.     Review of Systems   Constitutional:  Negative for activity change, chills, diaphoresis and fever.   HENT:  Negative for sore throat and trouble swallowing.    Eyes:  Negative for photophobia and visual disturbance.   Respiratory:  Positive for cough (nonproductive). Negative for shortness of breath and wheezing.    Cardiovascular:  Negative for chest pain, palpitations and leg swelling.   Gastrointestinal:  Negative for abdominal pain, diarrhea, nausea and vomiting.   Genitourinary:  Positive for decreased urine volume (anuric 2/2 ESRD). Negative for dysuria, hematuria and urgency.   Musculoskeletal:  Negative for gait problem, joint swelling and neck pain.   Skin:  Negative for color change and rash.   Neurological:  Negative for dizziness, syncope, weakness, light-headedness and headaches.   Psychiatric/Behavioral:  Negative for agitation and confusion. The patient is not nervous/anxious.      Objective:     Vital Signs (Most Recent):  Temp: 98.7 °F (37.1 °C) (06/08/24 2333)  Pulse: 76 (06/09/24 0000)  Resp: (!) 23 (06/08/24 2333)  BP: (!) 140/71 (06/08/24 2333)  SpO2: 96 % (06/08/24 2333) Vital Signs (24h Range):  Temp:  [97.8 °F (36.6 °C)-99.4 °F (37.4 °C)] 98.7 °F (37.1 °C)  Pulse:  [] 76  Resp:  [16-24] 23  SpO2:  [90 %-100 %] 96 %  BP: (140-199)/() 140/71     Weight: 74.3 kg (163 lb 12.8 oz)  Body mass index is 25.66 kg/m².    Intake/Output Summary (Last 24 hours) at 6/9/2024 0106  Last data filed at 6/8/2024 1856  Gross per 24 hour   Intake 950 ml   Output 3900 ml   Net -2950 ml         Physical Exam  Vitals and nursing note reviewed.   Constitutional:       General:  He is not in acute distress.     Appearance: He is well-developed. He is not toxic-appearing or diaphoretic.   HENT:      Head: Normocephalic and atraumatic.      Mouth/Throat:      Pharynx: No oropharyngeal exudate.   Eyes:      Conjunctiva/sclera: Conjunctivae normal.   Cardiovascular:      Rate and Rhythm: Normal rate and regular rhythm.      Heart sounds: Normal heart sounds.   Pulmonary:      Effort: Pulmonary effort is normal. No respiratory distress.      Breath sounds: Decreased air movement (notable throughout, slightly improved from prior) present. Rales (coarse crackles b/l (R>L)) present. No wheezing.   Abdominal:      General: Bowel sounds are normal. There is no distension.      Palpations: Abdomen is soft.      Tenderness: There is no abdominal tenderness. There is no guarding.   Musculoskeletal:         General: Normal range of motion.      Cervical back: Normal range of motion and neck supple.      Right lower leg: No edema.      Left lower leg: No edema.   Lymphadenopathy:      Cervical: No cervical adenopathy.   Skin:     General: Skin is warm and dry.   Neurological:      Mental Status: He is alert and oriented to person, place, and time. Mental status is at baseline.   Psychiatric:         Mood and Affect: Mood normal.         Behavior: Behavior normal.           Significant Labs: All pertinent labs within the past 24 hours have been reviewed.    Significant Imaging: I have reviewed all pertinent imaging results/findings within the past 24 hours.

## 2024-06-09 NOTE — PLAN OF CARE
Problem: Adult Inpatient Plan of Care  Goal: Plan of Care Review  Outcome: Progressing  Goal: Patient-Specific Goal (Individualized)  Outcome: Progressing  Goal: Absence of Hospital-Acquired Illness or Injury  Outcome: Progressing  Goal: Optimal Comfort and Wellbeing  Outcome: Progressing  Goal: Readiness for Transition of Care  Outcome: Progressing     Problem: Hemodialysis  Goal: Safe, Effective Therapy Delivery  Outcome: Progressing  Goal: Effective Tissue Perfusion  Outcome: Progressing  Goal: Absence of Infection Signs and Symptoms  Outcome: Progressing

## 2024-06-09 NOTE — PROGRESS NOTES
"[facility] - Cardiology [facility] Medicine  Progress Note    Patient Name: Haroldo Dickinson  MRN: [id number]  Patient Class: IP- Inpatient   Admission Date: 6/7/2024  Length of Stay: 2 days  Attending Physician: Kassi Joseph MD  Primary Care Provider: Mireya Larose MD        Subjective:     Principal Problem:NSTEMI (non-ST elevated myocardial infarction)        HPI:  Haroldo Dickinson is a 48 y.o. male with a PMHx of CAD stents, diabetes, hypertension, ESRD on dialysis, hx CVA right sided residual deficits who presents to [facility] [place] for evaluation of chest pain. Patient reports acute onset left sided chest discomfort began while sitting in a chair last night. Described pain as his heart "beating out my chest". Reports some relief with SL nitro, however paint returned shortly after which prompted him to call 911. He reports left arm pain for the past few weeks which resolved after given dilaudid in the ED earlier today. Reports chest congestion and nonproductive cough for the past 2 weeks. Chest pain worsens with coughing fits. Denies fever, chills, LE swelling, HA, vision changes, or syncope.     Of note, patient has known CAD and underwent LHC 11/2023 with 2 vessel disease (90% LAD and  of RCA). He has frequent admissions for chest pain and NSTEMI. Anatomy is apparently not amenable to revascularization so medical therapy has been recommended.     ED: hypertensive to /100 and mildly tachycardic to 102. Satting 88% on RA, placed on 2L NC. WBC 12.51, D-dimer 4.14, BNP 1542, trop 0.1>>0.31. EKG without acute ST/T wave changes. CTA negative for PE but shows findings of pulmonary edema with possible superimposed aspiration or PNA.     Overview/Hospital Course:  No notes on file    Interval History: Notable increase in O2 needs following admission (see POC note for details). Pt remains on 10L HFNC this AM w/ nonproductive cough, but otherwise asymptomatic & w/o SOB. Planning for addn'l HD today for addn'l volume " removal per cards recs. Adding ARB for GDMT and increasing hydralazine for improved BP control.     Review of Systems   Constitutional:  Negative for activity change, chills, diaphoresis and fever.   HENT:  Negative for sore throat and trouble swallowing.    Eyes:  Negative for photophobia and visual disturbance.   Respiratory:  Positive for cough (nonproductive). Negative for shortness of breath and wheezing.    Cardiovascular:  Negative for chest pain, palpitations and leg swelling.   Gastrointestinal:  Negative for abdominal pain, diarrhea, nausea and vomiting.   Genitourinary:  Positive for decreased urine volume (anuric 2/2 ESRD). Negative for dysuria, hematuria and urgency.   Musculoskeletal:  Negative for gait problem, joint swelling and neck pain.   Skin:  Negative for color change and rash.   Neurological:  Negative for dizziness, syncope, weakness, light-headedness and headaches.   Psychiatric/Behavioral:  Negative for agitation and confusion. The patient is not nervous/anxious.      Objective:     Vital Signs (Most Recent):  Temp: 98.7 °F (37.1 °C) (06/08/24 2333)  Pulse: 76 (06/09/24 0000)  Resp: (!) 23 (06/08/24 2333)  BP: (!) 140/71 (06/08/24 2333)  SpO2: 96 % (06/08/24 2333) Vital Signs (24h Range):  Temp:  [97.8 °F (36.6 °C)-99.4 °F (37.4 °C)] 98.7 °F (37.1 °C)  Pulse:  [] 76  Resp:  [16-24] 23  SpO2:  [90 %-100 %] 96 %  BP: (140-199)/() 140/71     Weight: 74.3 kg (163 lb 12.8 oz)  Body mass index is 25.66 kg/m².    Intake/Output Summary (Last 24 hours) at 6/9/2024 0106  Last data filed at 6/8/2024 1856  Gross per 24 hour   Intake 950 ml   Output 3900 ml   Net -2950 ml         Physical Exam  Vitals and nursing note reviewed.   Constitutional:       General: He is not in acute distress.     Appearance: He is well-developed. He is not toxic-appearing or diaphoretic.   HENT:      Head: Normocephalic and atraumatic.      Mouth/Throat:      Pharynx: No oropharyngeal exudate.   Eyes:       Conjunctiva/sclera: Conjunctivae normal.   Cardiovascular:      Rate and Rhythm: Normal rate and regular rhythm.      Heart sounds: Normal heart sounds.   Pulmonary:      Effort: Pulmonary effort is normal. No respiratory distress.      Breath sounds: Decreased air movement (notable throughout, slightly improved from prior) present. Rales (coarse crackles b/l (R>L)) present. No wheezing.   Abdominal:      General: Bowel sounds are normal. There is no distension.      Palpations: Abdomen is soft.      Tenderness: There is no abdominal tenderness. There is no guarding.   Musculoskeletal:         General: Normal range of motion.      Cervical back: Normal range of motion and neck supple.      Right lower leg: No edema.      Left lower leg: No edema.   Lymphadenopathy:      Cervical: No cervical adenopathy.   Skin:     General: Skin is warm and dry.   Neurological:      Mental Status: He is alert and oriented to person, place, and time. Mental status is at baseline.   Psychiatric:         Mood and Affect: Mood normal.         Behavior: Behavior normal.           Significant Labs: All pertinent labs within the past 24 hours have been reviewed.    Significant Imaging: I have reviewed all pertinent imaging results/findings within the past 24 hours.    Assessment/Plan:      * NSTEMI (non-ST elevated myocardial infarction)  - briefly required nitro gtt in the ED, now chest pain free after gtt stopped  - EKG without ST/T wave changes  - trop peaked at 12  - cardiology consulted; suspect type 2 demand but recommend continuing heparin for now  - continue ASA, plavix, heparin gtt  - continue home anti anginals: coreg 50 mg BID, nifedipine 90 mg daily, increase imdur to 120 mg   - volume removal with HD    H/o renal cell carcinoma of left kidney  - s/p nephrectomy, no acute issues    Type 2 diabetes mellitus with chronic kidney disease on chronic dialysis, without long-term current use of insulin  - diet controlled   - ACHS  accuchecks     Chronic diastolic heart failure  - decompensated in the setting of ESRD  - HD for volume management   - strict I/Os, daily weights    Essential hypertension  Chronic, uncontrolled. Latest blood pressure and vitals reviewed-     Temp:  [97.8 °F (36.6 °C)-98.1 °F (36.7 °C)]   Pulse:  []   Resp:  [16-22]   BP: (162-180)/()   SpO2:  [88 %-99 %] .   Home meds for hypertension were reviewed and noted below.   Hypertension Medications               carvediloL (COREG) 25 MG tablet Take 2 tablets (50 mg total) by mouth 2 (two) times daily with meals.    hydrALAZINE (APRESOLINE) 50 MG tablet Take 1 tablet (50 mg total) by mouth every 12 (twelve) hours.    isosorbide mononitrate (IMDUR) 60 MG 24 hr tablet Take 1 tablet (60 mg total) by mouth once daily.    NIFEdipine (PROCARDIA-XL) 90 MG (OSM) 24 hr tablet Take 1 tablet (90 mg total) by mouth once daily.    nitroGLYCERIN (NITROSTAT) 0.4 MG SL tablet Place 1 tablet (0.4 mg total) under the tongue every 5 (five) minutes as needed for Chest pain.            - Continue home antihypertensive regimen  - Increase hydralazine  - Start ARB  - Volume removal as above  - PRN hydral & labetolol for goal SBP < 160    ESRD on hemodialysis  - MWF HD, last dialyzed on 6/5 prior to admission  - Received bedside HD 6/8 & again on 6/9  - nephro consulted for iHD    Hemiparesis affecting right side as late effect of cerebrovascular accident  - no acute issues  - continue ASA, statin     Acute respiratory failure with hypoxia  - satting 88% on RA, placed on 2L NC  - suspect largely due to volume overload but concern for superimposed PNA based on presentation and CTA chest finding  - volume removal with HD  - start rocephin and azithro for CAP coverage  - follow sputum cx  - Nebs, Mucinex and addn'l supportive care      VTE Risk Mitigation (From admission, onward)           Ordered     Reason for No Pharmacological VTE Prophylaxis  Once        Question:  Reasons:  Answer:   Physician Provided (leave comment)  Comment:  on heparin gtt    06/07/24 0931     IP VTE HIGH RISK PATIENT  Once         06/07/24 0931     heparin 25,000 units in dextrose 5% (100 units/ml) IV bolus from bag LOW INTENSITY nomogram - [facility]  As needed (PRN)        Question:  Heparin Infusion Adjustment (DO NOT MODIFY ANSWER)  Answer:  [email or web address]\Images\Pharmacy\HeparinInfusions\heparin LOW INTENSITY nomogram for [facility] [id number]    06/07/24 0729     heparin 25,000 units in dextrose 5% (100 units/ml) IV bolus from bag LOW INTENSITY nomogram - [facility]  As needed (PRN)        Question:  Heparin Infusion Adjustment (DO NOT MODIFY ANSWER)  Answer:  [email or web address]\Images\Pharmacy\HeparinInfusions\heparin LOW INTENSITY nomogram for [facility] [id number]    06/07/24 0729     heparin 25,000 units in dextrose 5% 250 mL (100 units/mL) infusion LOW INTENSITY nomogram - [facility]  Continuous        Question:  Begin at (units/kg/hr)  Answer:  12 06/07/24 0729                    Discharge Planning   QUIN: 6/12/2024     Code Status: Full Code   Is the patient medically ready for discharge?:     Reason for patient still in hospital (select all that apply): Patient trending condition and Treatment  Discharge Plan A: Home          Kassi Joseph MD  Department of Hospital Medicine   [facility] - Cardiology Stepdown

## 2024-06-10 ENCOUNTER — EPISODE CHANGES (OUTPATIENT)
Dept: CARDIOLOGY | Facility: CLINIC | Age: 49
End: 2024-06-10

## 2024-06-10 LAB
ALBUMIN SERPL BCP-MCNC: 2.3 G/DL (ref 3.5–5.2)
ANION GAP SERPL CALC-SCNC: 17 MMOL/L (ref 8–16)
APTT PPP: 48 SEC (ref 21–32)
BUN SERPL-MCNC: 71 MG/DL (ref 6–20)
CALCIUM SERPL-MCNC: 9.8 MG/DL (ref 8.7–10.5)
CHLORIDE SERPL-SCNC: 94 MMOL/L (ref 95–110)
CO2 SERPL-SCNC: 21 MMOL/L (ref 23–29)
CREAT SERPL-MCNC: 12.5 MG/DL (ref 0.5–1.4)
ERYTHROCYTE [DISTWIDTH] IN BLOOD BY AUTOMATED COUNT: 15.4 % (ref 11.5–14.5)
EST. GFR  (NO RACE VARIABLE): 4.5 ML/MIN/1.73 M^2
GLUCOSE SERPL-MCNC: 135 MG/DL (ref 70–110)
HCT VFR BLD AUTO: 26.4 % (ref 40–54)
HGB BLD-MCNC: 8.4 G/DL (ref 14–18)
MAGNESIUM SERPL-MCNC: 2.1 MG/DL (ref 1.6–2.6)
MCH RBC QN AUTO: 29.1 PG (ref 27–31)
MCHC RBC AUTO-ENTMCNC: 31.8 G/DL (ref 32–36)
MCV RBC AUTO: 91 FL (ref 82–98)
PHOSPHATE SERPL-MCNC: 7 MG/DL (ref 2.7–4.5)
PLATELET # BLD AUTO: 368 K/UL (ref 150–450)
PMV BLD AUTO: 10.1 FL (ref 9.2–12.9)
POTASSIUM SERPL-SCNC: 4.9 MMOL/L (ref 3.5–5.1)
RBC # BLD AUTO: 2.89 M/UL (ref 4.6–6.2)
SODIUM SERPL-SCNC: 132 MMOL/L (ref 136–145)
WBC # BLD AUTO: 11.23 K/UL (ref 3.9–12.7)

## 2024-06-10 PROCEDURE — 94761 N-INVAS EAR/PLS OXIMETRY MLT: CPT

## 2024-06-10 PROCEDURE — 63600175 PHARM REV CODE 636 W HCPCS: Performed by: STUDENT IN AN ORGANIZED HEALTH CARE EDUCATION/TRAINING PROGRAM

## 2024-06-10 PROCEDURE — 25000003 PHARM REV CODE 250: Performed by: PHYSICIAN ASSISTANT

## 2024-06-10 PROCEDURE — 85027 COMPLETE CBC AUTOMATED: CPT | Performed by: STUDENT IN AN ORGANIZED HEALTH CARE EDUCATION/TRAINING PROGRAM

## 2024-06-10 PROCEDURE — 99900035 HC TECH TIME PER 15 MIN (STAT)

## 2024-06-10 PROCEDURE — 25000242 PHARM REV CODE 250 ALT 637 W/ HCPCS: Performed by: STUDENT IN AN ORGANIZED HEALTH CARE EDUCATION/TRAINING PROGRAM

## 2024-06-10 PROCEDURE — 25000242 PHARM REV CODE 250 ALT 637 W/ HCPCS: Performed by: NURSE PRACTITIONER

## 2024-06-10 PROCEDURE — 80100016 HC MAINTENANCE HEMODIALYSIS

## 2024-06-10 PROCEDURE — 94640 AIRWAY INHALATION TREATMENT: CPT

## 2024-06-10 PROCEDURE — 25000003 PHARM REV CODE 250: Performed by: HOSPITALIST

## 2024-06-10 PROCEDURE — 27000221 HC OXYGEN, UP TO 24 HOURS

## 2024-06-10 PROCEDURE — 36415 COLL VENOUS BLD VENIPUNCTURE: CPT | Performed by: STUDENT IN AN ORGANIZED HEALTH CARE EDUCATION/TRAINING PROGRAM

## 2024-06-10 PROCEDURE — 83735 ASSAY OF MAGNESIUM: CPT | Performed by: STUDENT IN AN ORGANIZED HEALTH CARE EDUCATION/TRAINING PROGRAM

## 2024-06-10 PROCEDURE — 20600001 HC STEP DOWN PRIVATE ROOM

## 2024-06-10 PROCEDURE — 63600175 PHARM REV CODE 636 W HCPCS: Performed by: PHYSICIAN ASSISTANT

## 2024-06-10 PROCEDURE — 94664 DEMO&/EVAL PT USE INHALER: CPT

## 2024-06-10 PROCEDURE — 85730 THROMBOPLASTIN TIME PARTIAL: CPT | Performed by: STUDENT IN AN ORGANIZED HEALTH CARE EDUCATION/TRAINING PROGRAM

## 2024-06-10 PROCEDURE — 94799 UNLISTED PULMONARY SVC/PX: CPT

## 2024-06-10 PROCEDURE — 99233 SBSQ HOSP IP/OBS HIGH 50: CPT | Mod: ,,, | Performed by: HOSPITALIST

## 2024-06-10 PROCEDURE — 80069 RENAL FUNCTION PANEL: CPT | Performed by: STUDENT IN AN ORGANIZED HEALTH CARE EDUCATION/TRAINING PROGRAM

## 2024-06-10 PROCEDURE — 63600175 PHARM REV CODE 636 W HCPCS: Performed by: HOSPITALIST

## 2024-06-10 PROCEDURE — 25000003 PHARM REV CODE 250: Performed by: INTERNAL MEDICINE

## 2024-06-10 PROCEDURE — 25000003 PHARM REV CODE 250: Performed by: STUDENT IN AN ORGANIZED HEALTH CARE EDUCATION/TRAINING PROGRAM

## 2024-06-10 RX ORDER — SEVELAMER CARBONATE 800 MG/1
1600 TABLET, FILM COATED ORAL
Status: DISCONTINUED | OUTPATIENT
Start: 2024-06-10 | End: 2024-06-12 | Stop reason: HOSPADM

## 2024-06-10 RX ADMIN — HEPARIN SODIUM 24 UNITS/KG/HR: 10000 INJECTION, SOLUTION INTRAVENOUS at 03:06

## 2024-06-10 RX ADMIN — NIFEDIPINE 90 MG: 60 TABLET, FILM COATED, EXTENDED RELEASE ORAL at 10:06

## 2024-06-10 RX ADMIN — SEVELAMER CARBONATE 1600 MG: 800 TABLET, FILM COATED ORAL at 02:06

## 2024-06-10 RX ADMIN — SEVELAMER CARBONATE 800 MG: 800 TABLET, FILM COATED ORAL at 09:06

## 2024-06-10 RX ADMIN — HYDRALAZINE HYDROCHLORIDE 75 MG: 50 TABLET ORAL at 06:06

## 2024-06-10 RX ADMIN — CARVEDILOL 50 MG: 25 TABLET, FILM COATED ORAL at 08:06

## 2024-06-10 RX ADMIN — BUDESONIDE INHALATION 0.5 MG: 0.5 SUSPENSION RESPIRATORY (INHALATION) at 10:06

## 2024-06-10 RX ADMIN — CEFTRIAXONE 1 G: 1 INJECTION, POWDER, FOR SOLUTION INTRAMUSCULAR; INTRAVENOUS at 02:06

## 2024-06-10 RX ADMIN — HEPARIN SODIUM 24 UNITS/KG/HR: 10000 INJECTION, SOLUTION INTRAVENOUS at 06:06

## 2024-06-10 RX ADMIN — BUDESONIDE INHALATION 0.5 MG: 0.5 SUSPENSION RESPIRATORY (INHALATION) at 07:06

## 2024-06-10 RX ADMIN — HYDRALAZINE HYDROCHLORIDE 75 MG: 50 TABLET ORAL at 02:06

## 2024-06-10 RX ADMIN — CINACALCET 30 MG: 30 TABLET ORAL at 09:06

## 2024-06-10 RX ADMIN — ERYTHROPOIETIN 7720 UNITS: 4000 INJECTION, SOLUTION INTRAVENOUS; SUBCUTANEOUS at 12:06

## 2024-06-10 RX ADMIN — CARVEDILOL 50 MG: 25 TABLET, FILM COATED ORAL at 10:06

## 2024-06-10 RX ADMIN — IPRATROPIUM BROMIDE AND ALBUTEROL SULFATE 3 ML: 2.5; .5 SOLUTION RESPIRATORY (INHALATION) at 07:06

## 2024-06-10 RX ADMIN — IPRATROPIUM BROMIDE AND ALBUTEROL SULFATE 3 ML: 2.5; .5 SOLUTION RESPIRATORY (INHALATION) at 08:06

## 2024-06-10 RX ADMIN — SEVELAMER CARBONATE 1600 MG: 800 TABLET, FILM COATED ORAL at 06:06

## 2024-06-10 RX ADMIN — HYDRALAZINE HYDROCHLORIDE 75 MG: 50 TABLET ORAL at 05:06

## 2024-06-10 RX ADMIN — GUAIFENESIN 600 MG: 600 TABLET, EXTENDED RELEASE ORAL at 08:06

## 2024-06-10 RX ADMIN — ISOSORBIDE MONONITRATE 120 MG: 60 TABLET, EXTENDED RELEASE ORAL at 10:06

## 2024-06-10 NOTE — PROGRESS NOTES
Haroldo Dickinson   MR#:[id number]   RM:307/307 A  :1975  AGE:48 y.o.     Progress Note  Nephrology    Admit Date: 2024  Length of Stay: 3  Consult Requested By: Kassi Joseph MD  Reason for Consult:  ESRD admitted with NSTEMI    HPI    Haroldo Dickinson is a 47 yo male with CAD, CVA, RCC s/p bilateral nephrectomy, HTN, and ESRD on iHD MWF who presents to the hospital for CP evaluation. Nephrology has been consulted for ESRD management while IP.      Patient reports that his chest pain has been resolved since he came to the hospital      SUBJECTIVE:   Pt seen on HD today. Tolerating UF 3.6 lit. Denies chest pain, shortness of breath. Has Rt AVF working well      Review of Systems:  Constitutional: denies fever/weakness  Respiratory: denies SOB, cough   Cardiovascular: denies chest pain/palpitations   Gastrointestinal: denies N/V, diarrhea/constipation   Psych: denies confusion, depression    Patient Active Problem List   Diagnosis    Acute respiratory failure with hypoxia    NSTEMI (non-ST elevated myocardial infarction)    Cerebral microvascular disease    Hyperparathyroidism, secondary renal    Left ventricular hypertrophy due to hypertensive disease    Hemiparesis affecting right side as late effect of cerebrovascular accident    Renal hypertension    ESRD on hemodialysis    Coronary artery disease involving native coronary artery of native heart with unstable angina pectoris    Essential hypertension    Patient on waiting list for kidney transplant    Arteriovenous fistula    Hypertensive retinopathy, bilateral    Varicose veins of bilateral lower extremities with other complications    S/p nephrectomy    Anemia in end-stage renal disease    Chronic kidney disease-mineral and bone disorder    Cardiac murmur    Acute on chronic diastolic heart failure    PAD (peripheral artery disease)    Type 2 diabetes mellitus with chronic kidney disease on chronic dialysis, without long-term current use of insulin     H/o acute coronary syndrome with high troponin    H/o renal cell carcinoma of left kidney    Ambulatory dysfunction    Pleural effusion     Past Medical History:   Diagnosis Date    CAD (coronary artery disease), native coronary artery 11/21/2019    Cataract     Diabetes mellitus     Diabetic retinopathy     Dialysis patient     DM type 2 causing renal disease, not at goal     ESRD (end stage renal disease) started dialysis 01/2014 06/05/2014    History of colon polyps 02/10/2021    History of COVID-19 12/29/2022    Hyperparathyroidism, secondary renal 06/05/2014    Hypertension     NSTEMI (non-ST elevated myocardial infarction) 12/21/2013    Organ transplant candidate 06/05/2014    Pneumonia     Post PTCA 08/26/2020    RCA HILLARY 7-25-20    Renal cell carcinoma of right kidney     Renal hypertension     Stroke         OBJECTIVE:   Temp:  [97 °F (36.1 °C)-98.5 °F (36.9 °C)]   Pulse:  [65-81]   Resp:  [16-20]   BP: (116-156)/(63-84)   SpO2:  [92 %-99 %]       Intake/Output Summary (Last 24 hours) at 6/10/2024 1043  Last data filed at 6/10/2024 0518  Gross per 24 hour   Intake 872 ml   Output 0 ml   Net 872 ml         Physical Exam:  General Appearance: well developed, well nourished   HEENT: normocephalic, atraumatic    Eyes: conjunctivae/corneas clear. PERRL.   Oropharynx: MMM  Pulm:  Effort: normal   Auscultation: CTA B, no crackles/wheezes  Card:   Palpation:   Auscultation: RRR, S1/S2 nml   Ext. Edema:  Mild - moderate   GI: Tenderness/Masses: None    Other: None       Laboratory:  CBC with Diff:   Recent Labs   Lab 06/08/24  0552 06/09/24  0420 06/10/24  0349   WBC 15.62* 13.49* 11.23   HGB 9.9* 9.4* 8.4*   HCT 31.3* 30.2* 26.4*    360 368   LYMPH 6.6*  1.0  --   --    MONO 9.3  1.5*  --   --    EOSINOPHIL 3.2  --   --      COAG:  Recent Labs   Lab 06/07/24  0756 06/07/24  1453 06/09/24  1531 06/09/24  2131 06/10/24  0349   APTT 28.3   < > 34.9* 57.3* 48.0*   INR 1.0  --   --   --   --     < > = values in  "this interval not displayed.       CMP:   Recent Labs   Lab 06/07/24  0328 06/08/24  0552 06/09/24  0420 06/10/24  0349    95 80 135*   CALCIUM 10.6* 9.7 10.4 9.8   ALBUMIN 3.0*  --  2.5* 2.3*   PROT 7.4  --   --   --     132* 132* 132*   K 3.7 4.0 4.3 4.9   CO2 23 23 19* 21*    100 98 94*   BUN 41* 28* 47* 71*   CREATININE 9.4* 7.4* 10.5* 12.5*   ALKPHOS 72  --   --   --    ALT 13  --   --   --    AST 16  --   --   --    BILITOT 0.7  --   --   --    MG  --  1.8 2.1 2.1   PHOS  --  3.4 5.7* 7.0*     Estimated Creatinine Clearance: 6.9 mL/min (A) (based on SCr of 12.5 mg/dL (H)).  Corrected Calcium:      Cardiac markers: No results for input(s): "CKMB", "TROPONINT", "MYOGLOBIN" in the last 168 hours.  PT, PTT, INR    ABG  Recent Labs   Lab 06/07/24  0358   PH 7.393   PO2 51   PCO2 46.6*   HCO3 28.4*   BE 3*       Urinalysis:  No results for input(s): "COLORU", "CLARITYU", "SPECGRAV", "PHUR", "PROTEINUA", "GLUCOSEU", "BILIRUBINCON", "BLOODU", "WBCU", "RBCU", "BACTERIA", "MUCUS", "NITRITE", "LEUKOCYTESUR", "UROBILINOGEN", "HYALINECASTS" in the last 24 hours.  CMP:   Recent Labs   Lab 06/10/24  0349   *   CALCIUM 9.8   ALBUMIN 2.3*   *   K 4.9   CO2 21*   CL 94*   BUN 71*   CREATININE 12.5*     LFTs:   Recent Labs   Lab 06/10/24  0349   ALBUMIN 2.3*     Coagulation:   Recent Labs   Lab 06/10/24  0349   APTT 48.0*           Hemoglobin A1C   Date Value Ref Range Status   04/11/2024 4.5 (L) 4.8 - 5.9 % Final   03/11/2024 4.7 4.0 - 5.6 % Final     Comment:     ADA Screening Guidelines:  5.7-6.4%  Consistent with prediabetes  >or=6.5%  Consistent with diabetes    High levels of fetal hemoglobin interfere with the HbA1C  assay. Heterozygous hemoglobin variants (HbS, HgC, etc)do  not significantly interfere with this assay.   However, presence of multiple variants may affect accuracy.     11/27/2023 5.1 4.0 - 5.6 % Final     Comment:     ADA Screening Guidelines:  5.7-6.4%  Consistent with " prediabetes  >or=6.5%  Consistent with diabetes    High levels of fetal hemoglobin interfere with the HbA1C  assay. Heterozygous hemoglobin variants (HbS, HgC, etc)do  not significantly interfere with this assay.   However, presence of multiple variants may affect accuracy.     05/02/2023 4.6 4.0 - 5.6 % Final     Comment:     ADA Screening Guidelines:  5.7-6.4%  Consistent with prediabetes  >or=6.5%  Consistent with diabetes    High levels of fetal hemoglobin interfere with the HbA1C  assay. Heterozygous hemoglobin variants (HbS, HgC, etc)do  not significantly interfere with this assay.   However, presence of multiple variants may affect accuracy.             ACCESS    ASSESSMENT/PLAN:   Principle Problem:  Acute on chronic diastolic heart failure  Active Hospital Problems    Diagnosis  POA    *Acute on chronic diastolic heart failure [I50.33]  Yes    Pleural effusion [J90]  Yes    H/o renal cell carcinoma of left kidney [C64.2]  Yes    Type 2 diabetes mellitus with chronic kidney disease on chronic dialysis, without long-term current use of insulin [E11.22, N18.6, Z99.2]  Not Applicable    Coronary artery disease involving native coronary artery of native heart with unstable angina pectoris [I25.110]  Yes    Essential hypertension [I10]  Yes    ESRD on hemodialysis [N18.6, Z99.2]  Not Applicable    Hemiparesis affecting right side as late effect of cerebrovascular accident [I69.351]  Not Applicable    NSTEMI (non-ST elevated myocardial infarction) [I21.4]  Yes    Acute respiratory failure with hypoxia [J96.01]  Yes      Resolved Hospital Problems   No resolved problems to display.       A/P:    1. End stage renal failure on dialysis  ESRD on iHD MWF  Dr. Butt  4 hours  ALBARO AVF  EDW ~ 71 kg  No residual renal fx           -  Pt seen on HD today. UF of 3.6 lit target.     Right AVF working well     Anemia of renal disease   H and H at 8.4/26   Would check Iron saturation , ferritin   Would start EPO  IU/kg       Anion gap metabolic acidosis   Bicarb 21   Would increase bicarb bath on HD    Hyperphosphatemia Phos of 7.0   Would initiate sevelamer binders to help with hyperphosphatemia      Secondary Hyperparathyroidism     Would check PTH     NSTEMI   On hepatin infusion along with GDMT including aspirin, plavix   Cardiology on board.     Will plan on next HD session Wednesday if he stays inpatient    Aranza Bennett MD  Nephrology  [facility].

## 2024-06-10 NOTE — PROGRESS NOTES
"[facility] - Cardiology [facility] Medicine  Progress Note    Patient Name: Haroldo Dickinson  MRN: [id number]  Patient Class: IP- Inpatient   Admission Date: 6/7/2024  Length of Stay: 2 days  Attending Physician: Kassi Joseph MD  Primary Care Provider: Mireya Larose MD    Subjective:     Principal Problem: Acute on chronic diastolic heart failure    HPI:  Haroldo Dickinson is a 48 y.o. male w/ PMHx of CAD (s/p HILLARY to RCA in 2020), HTN, HFpEF, ESRD on dialysis, hx of RCC (s/p bilateral nephrectomies), T2DM, and hx of CVA (w/ residual R-sided deficits), who presented to [facility] ED on 6/7/24 via EMS for evaluation of chest pain. Pt reports sudden onset L-sided chest discomfort while at rest (sitting in chair) the night before. Describes sensation of his heart "beating out of [his] chest." Reports some relief w/ SL NTG, but w/ recurrence shortly afterwards prompting pt to call EMS. Pt also endorses LUE pain for past few weeks, as well as chest congestion w/ nonproductive cough. Reports chest pain is exacerbated w/ recent coughing. Denies fevers, chills, lightheadedness, LE swelling, SOB, headache, vision changes, N/V, or syncope.     Of note, pt has known severe CAD. Had HILLARY placed to RCA in 2020. More recently w/ [facility] in Nov 2023 noting 2-vessel dx, including 90% stenosis of LAD &  of RCA. Has had multiple presentations/admissions for chest pain and NSTEMI/elevated troponin. Per chart review, cardiology has noted that pt's anatomy is not amenable to revascularization, and has advised continued medical mgmt. Reports compliance w/ home meds & dialysis. Last run 6/5 as normal (however 6/3 run apparently didn't pull fluid).     On arrival to ED, pt afebrile, hypertensive (SBP 160s-180s), mildly tachycardic (HR 90s-100s), & mildly tachypneic (RR 18-22) w/ SpO2 88% on RA (placed on 2L NC w/ improvement). Labs notable for D-dimer 4.14, BNP 1542, troponin 0.161, WBC 12.2, Hgb 9.7, Cr 9.4, & BUN 41. CTA negative for PE but w/ " b/l ground-glass airspace opacities s/o pulmonary edema w/ possible superimposed PNA. Received SL NTG 0.4mg, IV dilaudid 0.5mg, & IV Zofran 4mg. Repeat troponin uptrending to 0.312. Pt w/o pain. Started on heparin & NTG ggts. EKG w/ nonspecific ST/T wave changes. Reviewed by cardiology w/ low suspicion for ACS etiology. Received home Imdur 120mg, nifedipine 90mg, carvedilol 50mg, ASA 81mg, Plavix 75mg, & atorvastatin 80mg. NTG ggt d/c'ed. Also started on ceftriaxone & azithromycin. Pt admitted to Hospital Medicine service for further workup & mgmt of AHRF 2/2 CHF exacerbation w/ possible PNA.     Overview/Hospital Course:  Rapid & dramatic uptrend in troponin (0.312 --> 3.074 within 4hrs). EKG stable & pt w/o active chest pain. Rapid increase in O2 needs as well (2L NC --> 10L HFNC while still in ED). Notably diminished breath sounds on eam, but luckily no respiratory distress, increased WOB or SOB (just dry cough); see Plan of Care note for details. Underwent bedside HD in ED w/ removal of 2L, although pt remained on 10L HFNC even w/ volume removal. Troponin peaked at 12. Pt requiring up to 12L HFNC the following morning. BP regimen escalated (increased scheduled hydralazine). Pt had addn'l run of HD on 6/8 w/ removal of 3.5L and subsequent decreased O2 needs (down to 6L HFNC the following AM).     Interval History:   Decreased O2 needs this AM- now down to 5-6L HFNC. Also w/ improved BP down to 140s. Pt reports continued dry, nonproductive cough, but much improved from admission. Continues to be w/o SOB, dizziness/lightheadedness, chest pain or palpitations. No HD planned for today. Will continue to titrate BP regimen for today.     Review of Systems   Constitutional:  Negative for activity change, chills, diaphoresis and fever.   HENT:  Negative for sore throat and trouble swallowing.    Eyes:  Negative for photophobia and visual disturbance.   Respiratory:  Positive for cough (nonproductive). Negative for  shortness of breath and wheezing.    Cardiovascular:  Negative for chest pain, palpitations and leg swelling.   Gastrointestinal:  Negative for abdominal pain, diarrhea, nausea and vomiting.   Genitourinary:  Positive for decreased urine volume (anuric 2/2 ESRD).   Musculoskeletal:  Negative for gait problem, joint swelling and neck pain.   Skin:  Negative for color change and rash.   Neurological:  Negative for dizziness, syncope, weakness, light-headedness and headaches.   Psychiatric/Behavioral:  Negative for agitation and confusion. The patient is not nervous/anxious.      Objective:     Vital Signs (Most Recent):  Temp: 97.8 °F (36.6 °C) (06/09/24 1923)  Pulse: 70 (06/09/24 1923)  Resp: 18 (06/09/24 1923)  BP: 134/68 (06/09/24 1923)  SpO2: 96 % (06/09/24 1923) Vital Signs (24h Range):  Temp:  [97.3 °F (36.3 °C)-98.7 °F (37.1 °C)] 97.8 °F (36.6 °C)  Pulse:  [] 70  Resp:  [18-23] 18  SpO2:  [95 %-99 %] 96 %  BP: (116-151)/(66-84) 134/68     Weight: 77.2 kg (170 lb 3.1 oz)  Body mass index is 26.66 kg/m².    Intake/Output Summary (Last 24 hours) at 6/9/2024 1958  Last data filed at 6/9/2024 1800  Gross per 24 hour   Intake 702 ml   Output 0 ml   Net 702 ml         Physical Exam  Constitutional:       General: He is not in acute distress.     Appearance: He is well-developed. He is not toxic-appearing or diaphoretic.   HENT:      Head: Normocephalic and atraumatic.   Eyes:      Conjunctiva/sclera: Conjunctivae normal.   Cardiovascular:      Rate and Rhythm: Normal rate and regular rhythm.      Heart sounds: Normal heart sounds.   Pulmonary:      Effort: Pulmonary effort is normal. No respiratory distress.      Breath sounds: Decreased air movement (notable throughout, slightly improved from prior) present. Wheezing (harsh scatted wheezes) present. No rales.   Abdominal:      General: Bowel sounds are normal. There is no distension.      Palpations: Abdomen is soft.      Tenderness: There is no abdominal  tenderness. There is no guarding.   Musculoskeletal:         General: Normal range of motion.      Right lower leg: No edema.      Left lower leg: No edema.   Skin:     General: Skin is warm and dry.   Neurological:      Mental Status: He is alert and oriented to person, place, and time. Mental status is at baseline.   Psychiatric:         Mood and Affect: Mood normal.         Behavior: Behavior normal.           Significant Labs: All pertinent labs within the past 24 hours have been reviewed.    Significant Imaging: I have reviewed all pertinent imaging results/findings within the past 24 hours.    Assessment/Plan:      Acute on chronic diastolic heart failure  Hypertensive emergency  Type II NSTEMI (demand ischemia)   Last echo (Mar 2024) w/ EF 55% & diastolic dysfunction. Presented w/ chest congestion & nonproductive cough x few weeks w/ interval development of L-sided chest discomfort x 1 day. Hypertensive (SBP 160s-180s) on arrival w/ initial BNP 1542 & troponin 0.161 (w/ uptrend on repeat). Started on heparin & NTG ggt in ED. EKG w/ nonspecific ST/T wave changes. Chest imaging w/ pulmonary edema. Initial weight 76.2kg (approx dry weight 72.6kg?). Continued dramatic uptrend in troponin along w/ rapid increase in O2 needs (up to 10L HFNC while in ED). Pt w/ notable CAD hx raising c/f ACS etiology. Reviewed by Cardiology who did not appreciate acute changes on EKG; favored demand ischemia 2/2 volume overload & HTN (along w/ poor renal clearance of troponin), but advised continuing heparin ggt for time being. NTG ggt d/c'ed & pt remained pain-free. Received home BP meds (w/ uptitration) & HD for volume removal. Troponin peaked at 12. TTE w/ severe LA dilation & CVP 15mmHg. Gradual downtrend in O2 needs following repeat HD w/ volume removal & improved BP control.   - Cardiology consulted; appreciate recs  - Continue heparin ggt   - Continue home DAPT + statin   - BP mgmt as below; goal SBP < 160  - Volume mgmt w/  HD as below  - No longer trending troponin   - Strict I/Os & daily weights  - Needs updated dry weight   - Fluid (1.5L/day) & sodium (2g) restriction  - Monitor BMP w/ electrolyte replacement PRN  - Telemetry     Acute respiratory failure with hypoxia  Presented w/ few months of chest congestion & dry cough, but no SOB. On arrival, SpO2 88% on RA. Initially stabilized on 2L NC, but had rapid increase in O2 needs (up to 10L HFNC) while still in ED. CTA negative for PE but w/ b/l ground-glass airspace opacities s/o pulmonary edema w/ possible superimposed PNA. Significantly decreased breath sounds on exam, but no accessory muscle use or e/o respiratory distress. Suspect primarily hypervolemia etiology, but poss superimposed PNA considered. Remains afebrile w/ nml WBC. Decreased O2 needs following repeat HD w/ volume removal & BP control.   - CHF & BP mgmt as above/below  - Volume mgmt as below   - CTX + azithromycin x5 days   - Scheduled + PRN nebs   - Supportive care w/ Mucinex, tessalon, etc.   - Supplemental O2 PRN for goal SpO2 > 92%     ESRD on hemodialysis  Hx of hypertensive nephropathy. Pt has been receiving HD since 2013. Anuric at baseline. Endorses compliance w/ HD; last run prior to admission was 6/5. Received HD on admission (6/7) w/ removal of 2L and addn'l run on 6/8 w/ removal of 3.5L for mgmt of AHRF/pulmonary edema 2/2 CHF exacerbation, likely d/t uncontrolled BP.   - Nephrology consulted; appreciate recs  - Continue HD M/W/F per nephrology (w/ addn'l runs PRN pending volume status/O2 needs)  - Continue home sevelamer, cinacalcet & nephrovite  - Home phos-binder not on hospital formulary  - Monitor RFP & daily weights   - Will need updated dry weight     Essential HTN   Hx of chronic poorly-controlled HTN (w/ complications of CVA & ESRD), managed w/ home regimen of carvedilol, nifedipine, Imdur & hydralazine (and volume mgmt w/ HD). Pt reports medication & HD compliance. Hypertensive on arrival (SBP  160s-180s). Suspect uncontrolled HTN largely contributing to presentation.   - Continue home carvedilol 50mg BID  - Continue home nifedipine 90mg   - Increase home Imdur to 120mg daily   - Increase home hydralazine to 75mg Q6H   - Volume removal as above  - PRN hydralazine & labetolol for goal SBP < 160  - Consider addition of minoxidil or clonidine if needed     Coronary artery disease  Notable CAD hx w/ multiple presentations/admissions for chest pain and NSTEMI/elevated troponin. HILLARY placed to RCA in 2020. Most recently, [facility] (Nov 2023) noted 2-vessel dx w/ 90% stenosis of LAD &  of RCA. Apparently pt's anatomy is not amenable to revascularization, and has been advised per cardiology to manage medically. Presented w/ L-sided chest pain and significant troponin elevation (see above). Evaluated by cardiology who did not appreciate any acute ischemic changes on EKGs and favored demand ischemia 2/2 hypertensive urgency/emergency & volume overload w/ resulting hypoxia.   - Continue heparin ggt for now  - Continue home DAPT & statinm  - Continue home BB  - BP mgmt as above    NIDDM2   Hx of well-controlled T2DM with diet alone (not on insulin or oral agents); last A1c 4.5% (Apr 2024). Serum glucose 105 on admission.   - Deferring SSI & CBGs given euglycemia   - Hypoglycemic protocol     Hx of RCC  S/p bilateral nephrectomies   Hx of RCC- first of R kidney in 2022 (s/p R nephrectomy May 2022), then of L kidney in 2023 (s/p L nephrectomy in 2023).   - HD as above    Hx of CVA  R-sided weakness  Remote CVA hx (2013?) re: uncontrolled HTN w/ residual R-sided weakness. No acute issues/concerns on current admission.   - Continue ASA & statin  - BP control as above      VTE Risk Mitigation (From admission, onward)           Ordered     Reason for No Pharmacological VTE Prophylaxis  Once        Question:  Reasons:  Answer:  Physician Provided (leave comment)  Comment:  on heparin gtt    06/07/24 0931     IP VTE HIGH RISK  PATIENT  Once         06/07/24 0931     heparin 25,000 units in dextrose 5% (100 units/ml) IV bolus from bag LOW INTENSITY nomogram - [facility]  As needed (PRN)        Question:  Heparin Infusion Adjustment (DO NOT MODIFY ANSWER)  Answer:  [email or web address]\Images\Pharmacy\HeparinInfusions\heparin LOW INTENSITY nomogram for [facility] [id number]    06/07/24 0729     heparin 25,000 units in dextrose 5% (100 units/ml) IV bolus from bag LOW INTENSITY nomogram - [facility]  As needed (PRN)        Question:  Heparin Infusion Adjustment (DO NOT MODIFY ANSWER)  Answer:  [email or web address]\Images\Pharmacy\HeparinInfusions\heparin LOW INTENSITY nomogram for [facility] [id number]    06/07/24 0729     heparin 25,000 units in dextrose 5% 250 mL (100 units/mL) infusion LOW INTENSITY nomogram - [facility]  Continuous        Question:  Begin at (units/kg/hr)  Answer:  12 06/07/24 0729                    Discharge Planning   QUIN: 6/12/2024     Code Status: Full Code   Is the patient medically ready for discharge?:     Reason for patient still in hospital (select all that apply): Patient trending condition and Treatment  Discharge Plan A: Home          Kassi Joseph MD  Department of Hospital Medicine   [facility] - Cardiology Stepdown

## 2024-06-10 NOTE — HOSPITAL COURSE
Rapid & dramatic uptrend in troponin (0.312 --> 3.074 within 4hrs). EKG stable & pt w/o active chest pain. Rapid increase in O2 needs as well (2L NC --> 10L HFNC while still in ED). Notably diminished breath sounds on eam, but luckily no respiratory distress, increased WOB or SOB (just dry cough); see Plan of Care note for details. Underwent bedside HD in ED w/ removal of 2L, although pt remained on 10L HFNC even w/ volume removal. Troponin peaked at 12. Pt requiring up to 12L HFNC the following morning. BP regimen escalated (increased scheduled hydralazine). Pt had addn'l run of HD on 6/8 w/ removal of 3.5L and subsequent decreased O2 needs (down to 6L HFNC the following AM). Continued downtrend in O2 needs w/ volume removal via HD & improved BP control. Had addn'l 3L removed on 6/10 & again on 6/12. Some continued crackles on auscultation, but exam much improved and pt stable on RA on discharge; weight 71 kg. Discharged home on increased BP regimen and to continue regular M/W/F HD.

## 2024-06-10 NOTE — SUBJECTIVE & OBJECTIVE
Interval History:   Had HD yesterday w/ removal of 3L. Continued decrease in supplemental O2 needs (down to 2L) this AM & notably improved respiratory exam. Improved BP control, although hypertensive during HD yesterday, and remains still above goal (although not alarmingly so). Will start minoxidil and monitor for response. Discharge pending further volume removal, BP control & weaning off supplemental O2. Could potentially discharge tmrw afternoon after HD pending subsequent O2 needs.     Review of Systems   Constitutional:  Negative for activity change, chills, diaphoresis and fever.   HENT:  Negative for sore throat and trouble swallowing.    Eyes:  Negative for photophobia and visual disturbance.   Respiratory:  Positive for cough (dry, improved). Negative for shortness of breath and wheezing.    Cardiovascular:  Negative for chest pain, palpitations and leg swelling.   Gastrointestinal:  Negative for abdominal pain, diarrhea, nausea and vomiting.   Genitourinary:  Positive for decreased urine volume (anuric 2/2 ESRD).   Musculoskeletal:  Negative for gait problem, joint swelling and neck pain.   Skin:  Negative for color change and rash.   Neurological:  Negative for dizziness, syncope, weakness, light-headedness and headaches.   Psychiatric/Behavioral:  Negative for agitation and confusion. The patient is not nervous/anxious.      Objective:     Vital Signs (Most Recent):  Temp: 97.8 °F (36.6 °C) (06/09/24 1923)  Pulse: 70 (06/09/24 1923)  Resp: 18 (06/09/24 1923)  BP: 134/68 (06/09/24 1923)  SpO2: 96 % (06/09/24 1923) Vital Signs (24h Range):  Temp:  [97.3 °F (36.3 °C)-98.7 °F (37.1 °C)] 97.8 °F (36.6 °C)  Pulse:  [] 70  Resp:  [18-23] 18  SpO2:  [95 %-99 %] 96 %  BP: (116-151)/(66-84) 134/68     Weight: 77.2 kg (170 lb 3.1 oz)  Body mass index is 26.66 kg/m².    Intake/Output Summary (Last 24 hours) at 6/9/2024 1958  Last data filed at 6/9/2024 1800  Gross per 24 hour   Intake 702 ml   Output 0 ml    Net 702 ml         Physical Exam  Constitutional:       General: He is not in acute distress.     Appearance: He is well-developed. He is not toxic-appearing or diaphoretic.   HENT:      Head: Normocephalic and atraumatic.   Eyes:      Conjunctiva/sclera: Conjunctivae normal.   Cardiovascular:      Rate and Rhythm: Normal rate and regular rhythm.      Heart sounds: Normal heart sounds.   Pulmonary:      Effort: Pulmonary effort is normal. No respiratory distress.      Breath sounds: Decreased air movement (b/l lower lobes, improved from prior) present. Wheezing (harsh scatted wheezes b/l) and rales (coarse, mid-lower fields b/l) present.   Abdominal:      General: Bowel sounds are normal. There is no distension.      Palpations: Abdomen is soft.      Tenderness: There is no abdominal tenderness. There is no guarding.   Musculoskeletal:         General: Normal range of motion.      Right lower leg: No edema.      Left lower leg: No edema.   Skin:     General: Skin is warm and dry.   Neurological:      Mental Status: He is alert and oriented to person, place, and time. Mental status is at baseline.   Psychiatric:         Mood and Affect: Mood normal.         Behavior: Behavior normal.           Significant Labs: All pertinent labs within the past 24 hours have been reviewed.    Significant Imaging: I have reviewed all pertinent imaging results/findings within the past 24 hours.

## 2024-06-10 NOTE — NURSING
Dialysis tx started to the right arm AVF per orders placed by Dr. Reyes:    Order Questions    Question Answer   Antibiotics on HD? No   Duration of Treatment 3.5 hours   Dialyzer F180NR   Dialysate Temperature (C) 36.5   Target  mL/min   If unable to maintain flow due to inadequate vascular access patency, patient intolerance (i.e. chest pain, access discomfort) or elevated venous pressure, adjust blood flow rate to a minimum of _____mL/min 100    mL/min   K+ Potassium per Protocol   Ca++ Calcium per Protocol   Na+ Sodium per Protocol   Bicarb Bicarbonate per Protocol   Access to be used AVF   Needle gauge 15 gauge   Location Arm   Laterality Right   Target UF 3L   If unable to maintain this UFR due to patient intolerance (i.e. hypotension, chest pain, muscle cramping, nausea or vomiting), adjust UFR to achieve a minimum of _______ liters of UF 0   Fluid Removal Instructions maintain SBP > 90 mmHG

## 2024-06-10 NOTE — PLAN OF CARE
Problem: Hemodialysis  Goal: Safe, Effective Therapy Delivery  Outcome: Progressing  Goal: Effective Tissue Perfusion  Outcome: Progressing  Goal: Absence of Infection Signs and Symptoms  Outcome: Progressing       Dialysis tx ended to the right arm AVF, hemostasis achieved.    Net fluid removed: 3L    Report given to primary nurse, pt transported from [facility] to room 307 by wheelchair.

## 2024-06-11 LAB
ALBUMIN SERPL BCP-MCNC: 2.5 G/DL (ref 3.5–5.2)
ANION GAP SERPL CALC-SCNC: 15 MMOL/L (ref 8–16)
APTT PPP: 49.3 SEC (ref 21–32)
BUN SERPL-MCNC: 41 MG/DL (ref 6–20)
CALCIUM SERPL-MCNC: 9.6 MG/DL (ref 8.7–10.5)
CHLORIDE SERPL-SCNC: 93 MMOL/L (ref 95–110)
CO2 SERPL-SCNC: 25 MMOL/L (ref 23–29)
CREAT SERPL-MCNC: 8.9 MG/DL (ref 0.5–1.4)
ERYTHROCYTE [DISTWIDTH] IN BLOOD BY AUTOMATED COUNT: 15.9 % (ref 11.5–14.5)
EST. GFR  (NO RACE VARIABLE): 6.8 ML/MIN/1.73 M^2
GLUCOSE SERPL-MCNC: 80 MG/DL (ref 70–110)
HCT VFR BLD AUTO: 28 % (ref 40–54)
HGB BLD-MCNC: 8.9 G/DL (ref 14–18)
MAGNESIUM SERPL-MCNC: 2 MG/DL (ref 1.6–2.6)
MCH RBC QN AUTO: 29.6 PG (ref 27–31)
MCHC RBC AUTO-ENTMCNC: 31.8 G/DL (ref 32–36)
MCV RBC AUTO: 93 FL (ref 82–98)
PHOSPHATE SERPL-MCNC: 5.7 MG/DL (ref 2.7–4.5)
PLATELET # BLD AUTO: 416 K/UL (ref 150–450)
PMV BLD AUTO: 9.8 FL (ref 9.2–12.9)
POTASSIUM SERPL-SCNC: 4.3 MMOL/L (ref 3.5–5.1)
RBC # BLD AUTO: 3.01 M/UL (ref 4.6–6.2)
SODIUM SERPL-SCNC: 133 MMOL/L (ref 136–145)
WBC # BLD AUTO: 11.72 K/UL (ref 3.9–12.7)

## 2024-06-11 PROCEDURE — 27000646 HC AEROBIKA DEVICE

## 2024-06-11 PROCEDURE — 36415 COLL VENOUS BLD VENIPUNCTURE: CPT | Performed by: STUDENT IN AN ORGANIZED HEALTH CARE EDUCATION/TRAINING PROGRAM

## 2024-06-11 PROCEDURE — 63700000 PHARM REV CODE 250 ALT 637 W/O HCPCS: Performed by: PHYSICIAN ASSISTANT

## 2024-06-11 PROCEDURE — 83735 ASSAY OF MAGNESIUM: CPT | Performed by: STUDENT IN AN ORGANIZED HEALTH CARE EDUCATION/TRAINING PROGRAM

## 2024-06-11 PROCEDURE — 85027 COMPLETE CBC AUTOMATED: CPT | Performed by: STUDENT IN AN ORGANIZED HEALTH CARE EDUCATION/TRAINING PROGRAM

## 2024-06-11 PROCEDURE — 99900035 HC TECH TIME PER 15 MIN (STAT)

## 2024-06-11 PROCEDURE — 85730 THROMBOPLASTIN TIME PARTIAL: CPT | Performed by: STUDENT IN AN ORGANIZED HEALTH CARE EDUCATION/TRAINING PROGRAM

## 2024-06-11 PROCEDURE — 25000003 PHARM REV CODE 250: Performed by: INTERNAL MEDICINE

## 2024-06-11 PROCEDURE — 25000242 PHARM REV CODE 250 ALT 637 W/ HCPCS: Performed by: STUDENT IN AN ORGANIZED HEALTH CARE EDUCATION/TRAINING PROGRAM

## 2024-06-11 PROCEDURE — 94664 DEMO&/EVAL PT USE INHALER: CPT

## 2024-06-11 PROCEDURE — 25000003 PHARM REV CODE 250: Performed by: PHYSICIAN ASSISTANT

## 2024-06-11 PROCEDURE — 25000242 PHARM REV CODE 250 ALT 637 W/ HCPCS: Performed by: NURSE PRACTITIONER

## 2024-06-11 PROCEDURE — 25000003 PHARM REV CODE 250: Performed by: STUDENT IN AN ORGANIZED HEALTH CARE EDUCATION/TRAINING PROGRAM

## 2024-06-11 PROCEDURE — 94640 AIRWAY INHALATION TREATMENT: CPT

## 2024-06-11 PROCEDURE — 63600175 PHARM REV CODE 636 W HCPCS: Performed by: STUDENT IN AN ORGANIZED HEALTH CARE EDUCATION/TRAINING PROGRAM

## 2024-06-11 PROCEDURE — 99232 SBSQ HOSP IP/OBS MODERATE 35: CPT | Mod: ,,, | Performed by: HOSPITALIST

## 2024-06-11 PROCEDURE — 20600001 HC STEP DOWN PRIVATE ROOM

## 2024-06-11 PROCEDURE — 80069 RENAL FUNCTION PANEL: CPT | Performed by: STUDENT IN AN ORGANIZED HEALTH CARE EDUCATION/TRAINING PROGRAM

## 2024-06-11 PROCEDURE — 94761 N-INVAS EAR/PLS OXIMETRY MLT: CPT

## 2024-06-11 PROCEDURE — 63600175 PHARM REV CODE 636 W HCPCS: Performed by: PHYSICIAN ASSISTANT

## 2024-06-11 PROCEDURE — 25000003 PHARM REV CODE 250: Performed by: HOSPITALIST

## 2024-06-11 PROCEDURE — 27000221 HC OXYGEN, UP TO 24 HOURS

## 2024-06-11 RX ORDER — HYDRALAZINE HYDROCHLORIDE 50 MG/1
100 TABLET, FILM COATED ORAL EVERY 8 HOURS
Status: DISCONTINUED | OUTPATIENT
Start: 2024-06-11 | End: 2024-06-12 | Stop reason: HOSPADM

## 2024-06-11 RX ORDER — SODIUM CHLORIDE 9 MG/ML
INJECTION, SOLUTION INTRAVENOUS
Status: DISCONTINUED | OUTPATIENT
Start: 2024-06-12 | End: 2024-06-12 | Stop reason: HOSPADM

## 2024-06-11 RX ORDER — IPRATROPIUM BROMIDE AND ALBUTEROL SULFATE 2.5; .5 MG/3ML; MG/3ML
3 SOLUTION RESPIRATORY (INHALATION) EVERY 4 HOURS PRN
Status: DISCONTINUED | OUTPATIENT
Start: 2024-06-11 | End: 2024-06-12 | Stop reason: HOSPADM

## 2024-06-11 RX ORDER — MINOXIDIL 2.5 MG/1
5 TABLET ORAL DAILY
Status: DISCONTINUED | OUTPATIENT
Start: 2024-06-12 | End: 2024-06-12 | Stop reason: HOSPADM

## 2024-06-11 RX ORDER — SODIUM CHLORIDE 9 MG/ML
INJECTION, SOLUTION INTRAVENOUS ONCE
Status: DISCONTINUED | OUTPATIENT
Start: 2024-06-12 | End: 2024-06-12 | Stop reason: HOSPADM

## 2024-06-11 RX ADMIN — CINACALCET 30 MG: 30 TABLET ORAL at 09:06

## 2024-06-11 RX ADMIN — IPRATROPIUM BROMIDE AND ALBUTEROL SULFATE 3 ML: 2.5; .5 SOLUTION RESPIRATORY (INHALATION) at 02:06

## 2024-06-11 RX ADMIN — CARVEDILOL 50 MG: 25 TABLET, FILM COATED ORAL at 08:06

## 2024-06-11 RX ADMIN — HYDRALAZINE HYDROCHLORIDE 75 MG: 50 TABLET ORAL at 12:06

## 2024-06-11 RX ADMIN — HYDRALAZINE HYDROCHLORIDE 100 MG: 50 TABLET ORAL at 10:06

## 2024-06-11 RX ADMIN — CLOPIDOGREL BISULFATE 75 MG: 75 TABLET ORAL at 09:06

## 2024-06-11 RX ADMIN — ATORVASTATIN CALCIUM 80 MG: 40 TABLET, FILM COATED ORAL at 09:06

## 2024-06-11 RX ADMIN — RENO CAPS 1 CAPSULE: 100; 1.5; 1.7; 20; 10; 1; 150; 5; 6 CAPSULE ORAL at 09:06

## 2024-06-11 RX ADMIN — HYDRALAZINE HYDROCHLORIDE 75 MG: 50 TABLET ORAL at 05:06

## 2024-06-11 RX ADMIN — GUAIFENESIN 600 MG: 600 TABLET, EXTENDED RELEASE ORAL at 09:06

## 2024-06-11 RX ADMIN — HYDRALAZINE HYDROCHLORIDE 75 MG: 50 TABLET ORAL at 11:06

## 2024-06-11 RX ADMIN — BUDESONIDE INHALATION 0.5 MG: 0.5 SUSPENSION RESPIRATORY (INHALATION) at 08:06

## 2024-06-11 RX ADMIN — ASPIRIN 81 MG CHEWABLE TABLET 81 MG: 81 TABLET CHEWABLE at 09:06

## 2024-06-11 RX ADMIN — IPRATROPIUM BROMIDE AND ALBUTEROL SULFATE 3 ML: 2.5; .5 SOLUTION RESPIRATORY (INHALATION) at 08:06

## 2024-06-11 RX ADMIN — ISOSORBIDE MONONITRATE 120 MG: 60 TABLET, EXTENDED RELEASE ORAL at 09:06

## 2024-06-11 RX ADMIN — CEFTRIAXONE 1 G: 1 INJECTION, POWDER, FOR SOLUTION INTRAMUSCULAR; INTRAVENOUS at 11:06

## 2024-06-11 RX ADMIN — EZETIMIBE 10 MG: 10 TABLET ORAL at 09:06

## 2024-06-11 RX ADMIN — SEVELAMER CARBONATE 1600 MG: 800 TABLET, FILM COATED ORAL at 04:06

## 2024-06-11 RX ADMIN — SEVELAMER CARBONATE 1600 MG: 800 TABLET, FILM COATED ORAL at 09:06

## 2024-06-11 RX ADMIN — AZITHROMYCIN DIHYDRATE 250 MG: 250 TABLET ORAL at 09:06

## 2024-06-11 RX ADMIN — HEPARIN SODIUM 24 UNITS/KG/HR: 10000 INJECTION, SOLUTION INTRAVENOUS at 10:06

## 2024-06-11 RX ADMIN — NIFEDIPINE 90 MG: 60 TABLET, FILM COATED, EXTENDED RELEASE ORAL at 09:06

## 2024-06-11 RX ADMIN — CARVEDILOL 50 MG: 25 TABLET, FILM COATED ORAL at 09:06

## 2024-06-11 RX ADMIN — SEVELAMER CARBONATE 1600 MG: 800 TABLET, FILM COATED ORAL at 11:06

## 2024-06-11 NOTE — PROGRESS NOTES
"[facility] - Cardiology [facility] Medicine  Progress Note    Patient Name: Haroldo Dickinson  MRN: [id number]  Patient Class: IP- Inpatient   Admission Date: 6/7/2024  Length of Stay: 3 days  Attending Physician: Kassi Joseph MD  Primary Care Provider: Mireya Larose MD    Subjective:     Principal Problem: Acute on chronic diastolic heart failure    HPI:  Haroldo Dickinson is a 48 y.o. male w/ PMHx of CAD (s/p HILLARY to RCA in 2020), HTN, HFpEF, ESRD on dialysis, hx of RCC (s/p bilateral nephrectomies), T2DM, and hx of CVA (w/ residual R-sided deficits), who presented to [facility] ED on 6/7/24 via EMS for evaluation of chest pain. Pt reports sudden onset L-sided chest discomfort while at rest (sitting in chair) the night before. Describes sensation of his heart "beating out of [his] chest." Reports some relief w/ SL NTG, but w/ recurrence shortly afterwards prompting pt to call EMS. Pt also endorses LUE pain for past few weeks, as well as chest congestion w/ nonproductive cough. Reports chest pain is exacerbated w/ recent coughing. Denies fevers, chills, lightheadedness, LE swelling, SOB, headache, vision changes, N/V, or syncope.     Of note, pt has known severe CAD. Had HILLARY placed to RCA in 2020. More recently w/ [facility] in Nov 2023 noting 2-vessel dx, including 90% stenosis of LAD &  of RCA. Has had multiple presentations/admissions for chest pain and NSTEMI/elevated troponin. Per chart review, cardiology has noted that pt's anatomy is not amenable to revascularization, and has advised continued medical mgmt. Reports compliance w/ home meds & dialysis. Last run 6/5 as normal (however 6/3 run apparently didn't pull fluid).     On arrival to ED, pt afebrile, hypertensive (SBP 160s-180s), mildly tachycardic (HR 90s-100s), & mildly tachypneic (RR 18-22) w/ SpO2 88% on RA (placed on 2L NC w/ improvement). Labs notable for D-dimer 4.14, BNP 1542, troponin 0.161, WBC 12.2, Hgb 9.7, Cr 9.4, & BUN 41. CTA negative for PE but w/ " b/l ground-glass airspace opacities s/o pulmonary edema w/ possible superimposed PNA. Received SL NTG 0.4mg, IV dilaudid 0.5mg, & IV Zofran 4mg. Repeat troponin uptrending to 0.312. Pt w/o pain. Started on heparin & NTG ggts. EKG w/ nonspecific ST/T wave changes. Reviewed by cardiology w/ low suspicion for ACS etiology. Received home Imdur 120mg, nifedipine 90mg, carvedilol 50mg, ASA 81mg, Plavix 75mg, & atorvastatin 80mg. NTG ggt d/c'ed. Also started on ceftriaxone & azithromycin. Pt admitted to Hospital Medicine service for further workup & mgmt of AHRF 2/2 CHF exacerbation w/ possible PNA.     Overview/Hospital Course:  Rapid & dramatic uptrend in troponin (0.312 --> 3.074 within 4hrs). EKG stable & pt w/o active chest pain. Rapid increase in O2 needs as well (2L NC --> 10L HFNC while still in ED). Notably diminished breath sounds on eam, but luckily no respiratory distress, increased WOB or SOB (just dry cough); see Plan of Care note for details. Underwent bedside HD in ED w/ removal of 2L, although pt remained on 10L HFNC even w/ volume removal. Troponin peaked at 12. Pt requiring up to 12L HFNC the following morning. BP regimen escalated (increased scheduled hydralazine). Pt had addn'l run of HD on 6/8 w/ removal of 3.5L and subsequent decreased O2 needs (down to 6L HFNC the following AM).     Interval History:   Decreased O2 needs this AM- now down to 3-4L HFNC. Also w/ improved BP down to 120s-130s. Had HD this AM w/ removal of 3L. Elevated BP (140s-160s) during HD, but w/ downtrend back to 120s-130s afterwards. Pt reports cough continues to improve. No SOB, dizziness/lightheadedness, chest pain or palpitations. Still with very diminished breath sounds, but much improved from prior. Discharge pending further volume removal, BP control & weaning off supplemental O2.     Review of Systems   Constitutional:  Negative for activity change, chills, diaphoresis and fever.   HENT:  Negative for sore throat and  trouble swallowing.    Eyes:  Negative for photophobia and visual disturbance.   Respiratory:  Positive for cough (nonproductive). Negative for shortness of breath and wheezing.    Cardiovascular:  Negative for chest pain, palpitations and leg swelling.   Gastrointestinal:  Negative for abdominal pain, diarrhea, nausea and vomiting.   Genitourinary:  Positive for decreased urine volume (anuric 2/2 ESRD).   Musculoskeletal:  Negative for gait problem, joint swelling and neck pain.   Skin:  Negative for color change and rash.   Neurological:  Negative for dizziness, syncope, weakness, light-headedness and headaches.   Psychiatric/Behavioral:  Negative for agitation and confusion. The patient is not nervous/anxious.      Objective:     Vital Signs (Most Recent):  Temp: 97.8 °F (36.6 °C) (06/09/24 1923)  Pulse: 70 (06/09/24 1923)  Resp: 18 (06/09/24 1923)  BP: 134/68 (06/09/24 1923)  SpO2: 96 % (06/09/24 1923) Vital Signs (24h Range):  Temp:  [97.3 °F (36.3 °C)-98.7 °F (37.1 °C)] 97.8 °F (36.6 °C)  Pulse:  [] 70  Resp:  [18-23] 18  SpO2:  [95 %-99 %] 96 %  BP: (116-151)/(66-84) 134/68     Weight: 77.2 kg (170 lb 3.1 oz)  Body mass index is 26.66 kg/m².    Intake/Output Summary (Last 24 hours) at 6/9/2024 1958  Last data filed at 6/9/2024 1800  Gross per 24 hour   Intake 702 ml   Output 0 ml   Net 702 ml         Physical Exam  Constitutional:       General: He is not in acute distress.     Appearance: He is well-developed. He is not toxic-appearing or diaphoretic.   HENT:      Head: Normocephalic and atraumatic.   Eyes:      Conjunctiva/sclera: Conjunctivae normal.   Cardiovascular:      Rate and Rhythm: Normal rate and regular rhythm.      Heart sounds: Normal heart sounds.   Pulmonary:      Effort: Pulmonary effort is normal. No respiratory distress.      Breath sounds: Decreased air movement (notable, mid-to-lower lobes) present. Wheezing (harsh scatted wheezes) and rales (mid-to-upper) present.   Abdominal:       General: Bowel sounds are normal. There is no distension.      Palpations: Abdomen is soft.      Tenderness: There is no abdominal tenderness. There is no guarding.   Musculoskeletal:         General: Normal range of motion.      Right lower leg: No edema.      Left lower leg: No edema.   Skin:     General: Skin is warm and dry.   Neurological:      Mental Status: He is alert and oriented to person, place, and time. Mental status is at baseline.   Psychiatric:         Mood and Affect: Mood normal.         Behavior: Behavior normal.           Significant Labs: All pertinent labs within the past 24 hours have been reviewed.    Significant Imaging: I have reviewed all pertinent imaging results/findings within the past 24 hours.    Assessment/Plan:      Acute on chronic diastolic heart failure  Hypertensive emergency  Type II NSTEMI (demand ischemia)   Last echo (Mar 2024) w/ EF 55% & diastolic dysfunction. Presented w/ chest congestion & nonproductive cough x few weeks w/ interval development of L-sided chest discomfort x 1 day. Hypertensive (SBP 160s-180s) on arrival w/ initial BNP 1542 & troponin 0.161 (w/ uptrend on repeat). Started on heparin & NTG ggt in ED. EKG w/ nonspecific ST/T wave changes. Chest imaging w/ pulmonary edema. Initial weight 76.2kg (approx dry weight 72.6kg?). Continued dramatic uptrend in troponin along w/ rapid increase in O2 needs (up to 10L HFNC while in ED). Pt w/ notable CAD hx raising c/f ACS etiology. Reviewed by Cardiology who did not appreciate acute changes on EKG; favored demand ischemia 2/2 volume overload & HTN (along w/ poor renal clearance of troponin), but advised continuing heparin ggt for time being. NTG ggt d/c'ed & pt remained pain-free. Received home BP meds (w/ uptitration) & HD for volume removal. Troponin peaked at 12. TTE w/ severe LA dilation & CVP 15mmHg. Gradual downtrend in O2 needs following repeat HD w/ volume removal & improved BP control.   - Cardiology  consulted; appreciate recs  - Continue heparin ggt   - Continue home DAPT + statin   - BP mgmt as below; goal SBP < 160  - Volume mgmt w/ HD as below  - No longer trending troponin   - Strict I/Os & daily weights  - Needs updated dry weight   - Fluid (1.5L/day) & sodium (2g) restriction  - Monitor BMP w/ electrolyte replacement PRN  - Telemetry     Acute respiratory failure with hypoxia  Presented w/ few months of chest congestion & dry cough, but no SOB. On arrival, SpO2 88% on RA. Initially stabilized on 2L NC, but had rapid increase in O2 needs (up to 10L HFNC) while still in ED. CTA negative for PE but w/ b/l ground-glass airspace opacities s/o pulmonary edema w/ possible superimposed PNA. Significantly decreased breath sounds on exam, but no accessory muscle use or e/o respiratory distress. Suspect primarily hypervolemia etiology, but poss superimposed PNA considered. Remains afebrile w/ nml WBC. Decreased O2 needs following repeat HD w/ volume removal & BP control.   - CHF & BP mgmt as above/below  - Volume mgmt as below   - CTX + azithromycin x5 days   - Scheduled + PRN nebs   - Supportive care w/ Mucinex, tessalon, etc.   - Supplemental O2 PRN for goal SpO2 > 92%     ESRD on hemodialysis  Hx of hypertensive nephropathy. Pt has been receiving HD since 2013. Anuric at baseline. Endorses compliance w/ HD; last run prior to admission was 6/5. Received HD on admission (6/7) w/ removal of 2L and addn'l run on 6/8 w/ removal of 3.5L for mgmt of AHRF/pulmonary edema 2/2 CHF exacerbation, likely d/t uncontrolled BP.   - Nephrology consulted; appreciate recs  - Continue HD M/W/F per nephrology (w/ addn'l runs PRN pending volume status/O2 needs)  - Continue home sevelamer, cinacalcet & nephrovite  - Home phos-binder not on hospital formulary  - Monitor RFP & daily weights   - Will need updated dry weight     Essential HTN   Hx of chronic poorly-controlled HTN (w/ complications of CVA & ESRD), managed w/ home regimen of  carvedilol, nifedipine, Imdur & hydralazine (and volume mgmt w/ HD). Pt reports medication & HD compliance. Hypertensive on arrival (SBP 160s-180s). Suspect uncontrolled HTN largely contributing to presentation.   - Continue home carvedilol 50mg BID  - Continue home nifedipine 90mg   - Increase home Imdur to 120mg daily   - Increase home hydralazine to 75mg Q6H   - Volume removal as above  - PRN hydralazine & labetolol for goal SBP < 160  - Consider addition of minoxidil or clonidine if needed     Coronary artery disease  Notable CAD hx w/ multiple presentations/admissions for chest pain and NSTEMI/elevated troponin. HILLARY placed to RCA in 2020. Most recently, [facility] (Nov 2023) noted 2-vessel dx w/ 90% stenosis of LAD &  of RCA. Apparently pt's anatomy is not amenable to revascularization, and has been advised per cardiology to manage medically. Presented w/ L-sided chest pain and significant troponin elevation (see above). Evaluated by cardiology who did not appreciate any acute ischemic changes on EKGs and favored demand ischemia 2/2 hypertensive urgency/emergency & volume overload w/ resulting hypoxia.   - Continue heparin ggt for now  - Continue home DAPT & statinm  - Continue home BB  - BP mgmt as above    NIDDM2   Hx of well-controlled T2DM with diet alone (not on insulin or oral agents); last A1c 4.5% (Apr 2024). Serum glucose 105 on admission.   - Deferring SSI & CBGs given euglycemia   - Hypoglycemic protocol     Hx of RCC  S/p bilateral nephrectomies   Hx of RCC- first of R kidney in 2022 (s/p R nephrectomy May 2022), then of L kidney in 2023 (s/p L nephrectomy in 2023).   - HD as above    Hx of CVA  R-sided weakness  Remote CVA hx (2013?) re: uncontrolled HTN w/ residual R-sided weakness. No acute issues/concerns on current admission.   - Continue ASA & statin  - BP control as above      VTE Risk Mitigation (From admission, onward)           Ordered     Reason for No Pharmacological VTE Prophylaxis  Once         Question:  Reasons:  Answer:  Physician Provided (leave comment)  Comment:  on heparin gtt    06/07/24 0931     IP VTE HIGH RISK PATIENT  Once         06/07/24 0931     heparin 25,000 units in dextrose 5% (100 units/ml) IV bolus from bag LOW INTENSITY nomogram - [facility]  As needed (PRN)        Question:  Heparin Infusion Adjustment (DO NOT MODIFY ANSWER)  Answer:  [email or web address]\Images\Pharmacy\HeparinInfusions\heparin LOW INTENSITY nomogram for [facility] [id number]    06/07/24 0729     heparin 25,000 units in dextrose 5% (100 units/ml) IV bolus from bag LOW INTENSITY nomogram - [facility]  As needed (PRN)        Question:  Heparin Infusion Adjustment (DO NOT MODIFY ANSWER)  Answer:  [email or web address]\Images\Pharmacy\HeparinInfusions\heparin LOW INTENSITY nomogram for [facility] [id number]    06/07/24 0729     heparin 25,000 units in dextrose 5% 250 mL (100 units/mL) infusion LOW INTENSITY nomogram - [facility]  Continuous        Question:  Begin at (units/kg/hr)  Answer:  12 06/07/24 0729                    Discharge Planning   QUIN: 6/12/2024     Code Status: Full Code   Is the patient medically ready for discharge?:     Reason for patient still in hospital (select all that apply): Patient trending condition and Treatment  Discharge Plan A: Home          Kassi Joseph MD  Department of Hospital Medicine   [facility] - Cardiology Stepdown

## 2024-06-11 NOTE — PROGRESS NOTES
"Kofi Evans - Cardiology [facility] Medicine  Progress Note    Patient Name: Haroldo Dickinson  MRN: [id number]  Patient Class: IP- Inpatient   Admission Date: 6/7/2024  Length of Stay: 4 days  Attending Physician: Kassi Joseph MD  Primary Care Provider: Mireya Larose MD    Subjective:     Principal Problem: Acute on chronic diastolic heart failure    HPI:  Haroldo Dickinson is a 48 y.o. male w/ PMHx of CAD (s/p HILLARY to RCA in 2020), HTN, HFpEF, ESRD on dialysis, hx of RCC (s/p bilateral nephrectomies), T2DM, and hx of CVA (w/ residual R-sided deficits), who presented to [facility] ED on 6/7/24 via EMS for evaluation of chest pain. Pt reports sudden onset L-sided chest discomfort while at rest (sitting in chair) the night before. Describes sensation of his heart "beating out of [his] chest." Reports some relief w/ SL NTG, but w/ recurrence shortly afterwards prompting pt to call EMS. Pt also endorses LUE pain for past few weeks, as well as chest congestion w/ nonproductive cough. Reports chest pain is exacerbated w/ recent coughing. Denies fevers, chills, lightheadedness, LE swelling, SOB, headache, vision changes, N/V, or syncope.     Of note, pt has known severe CAD. Had HILLARY placed to RCA in 2020. More recently w/ [facility] in Nov 2023 noting 2-vessel dx, including 90% stenosis of LAD &  of RCA. Has had multiple presentations/admissions for chest pain and NSTEMI/elevated troponin. Per chart review, cardiology has noted that pt's anatomy is not amenable to revascularization, and has advised continued medical mgmt. Reports compliance w/ home meds & dialysis. Last run 6/5 as normal (however 6/3 run apparently didn't pull fluid).     On arrival to ED, pt afebrile, hypertensive (SBP 160s-180s), mildly tachycardic (HR 90s-100s), & mildly tachypneic (RR 18-22) w/ SpO2 88% on RA (placed on 2L NC w/ improvement). Labs notable for D-dimer 4.14, BNP 1542, troponin 0.161, WBC 12.2, Hgb 9.7, Cr 9.4, & BUN 41. CTA negative for PE but w/ " b/l ground-glass airspace opacities s/o pulmonary edema w/ possible superimposed PNA. Received SL NTG 0.4mg, IV dilaudid 0.5mg, & IV Zofran 4mg. Repeat troponin uptrending to 0.312. Pt w/o pain. Started on heparin & NTG ggts. EKG w/ nonspecific ST/T wave changes. Reviewed by cardiology w/ low suspicion for ACS etiology. Received home Imdur 120mg, nifedipine 90mg, carvedilol 50mg, ASA 81mg, Plavix 75mg, & atorvastatin 80mg. NTG ggt d/c'ed. Also started on ceftriaxone & azithromycin. Pt admitted to Hospital Medicine service for further workup & mgmt of AHRF 2/2 CHF exacerbation w/ possible PNA.     Overview/Hospital Course:  Rapid & dramatic uptrend in troponin (0.312 --> 3.074 within 4hrs). EKG stable & pt w/o active chest pain. Rapid increase in O2 needs as well (2L NC --> 10L HFNC while still in ED). Notably diminished breath sounds on eam, but luckily no respiratory distress, increased WOB or SOB (just dry cough); see Plan of Care note for details. Underwent bedside HD in ED w/ removal of 2L, although pt remained on 10L HFNC even w/ volume removal. Troponin peaked at 12. Pt requiring up to 12L HFNC the following morning. BP regimen escalated (increased scheduled hydralazine). Pt had addn'l run of HD on 6/8 w/ removal of 3.5L and subsequent decreased O2 needs (down to 6L HFNC the following AM). Continued downtrend in O2 needs w/ HD & improved BP control.     Interval History:   Had HD yesterday w/ removal of 3L. Continued decrease in supplemental O2 needs (down to 2L) this AM & notably improved respiratory exam. Improved BP control, although hypertensive during HD yesterday, and remains still above goal (although not alarmingly so). Will start minoxidil and monitor for response. Discharge pending further volume removal, BP control & weaning off supplemental O2. Could potentially discharge tmrw afternoon after HD pending subsequent O2 needs.     Review of Systems   Constitutional:  Negative for activity change,  chills, diaphoresis and fever.   HENT:  Negative for sore throat and trouble swallowing.    Eyes:  Negative for photophobia and visual disturbance.   Respiratory:  Positive for cough (dry, improved). Negative for shortness of breath and wheezing.    Cardiovascular:  Negative for chest pain, palpitations and leg swelling.   Gastrointestinal:  Negative for abdominal pain, diarrhea, nausea and vomiting.   Genitourinary:  Positive for decreased urine volume (anuric 2/2 ESRD).   Musculoskeletal:  Negative for gait problem, joint swelling and neck pain.   Skin:  Negative for color change and rash.   Neurological:  Negative for dizziness, syncope, weakness, light-headedness and headaches.   Psychiatric/Behavioral:  Negative for agitation and confusion. The patient is not nervous/anxious.      Objective:     Vital Signs (Most Recent):  Temp: 97.8 °F (36.6 °C) (06/09/24 1923)  Pulse: 70 (06/09/24 1923)  Resp: 18 (06/09/24 1923)  BP: 134/68 (06/09/24 1923)  SpO2: 96 % (06/09/24 1923) Vital Signs (24h Range):  Temp:  [97.3 °F (36.3 °C)-98.7 °F (37.1 °C)] 97.8 °F (36.6 °C)  Pulse:  [] 70  Resp:  [18-23] 18  SpO2:  [95 %-99 %] 96 %  BP: (116-151)/(66-84) 134/68     Weight: 77.2 kg (170 lb 3.1 oz)  Body mass index is 26.66 kg/m².    Intake/Output Summary (Last 24 hours) at 6/9/2024 1958  Last data filed at 6/9/2024 1800  Gross per 24 hour   Intake 702 ml   Output 0 ml   Net 702 ml         Physical Exam  Constitutional:       General: He is not in acute distress.     Appearance: He is well-developed. He is not toxic-appearing or diaphoretic.   HENT:      Head: Normocephalic and atraumatic.   Eyes:      Conjunctiva/sclera: Conjunctivae normal.   Cardiovascular:      Rate and Rhythm: Normal rate and regular rhythm.      Heart sounds: Normal heart sounds.   Pulmonary:      Effort: Pulmonary effort is normal. No respiratory distress.      Breath sounds: Decreased air movement (b/l lower lobes, improved from prior) present.  Wheezing (harsh scatted wheezes b/l) and rales (coarse, mid-lower fields b/l) present.   Abdominal:      General: Bowel sounds are normal. There is no distension.      Palpations: Abdomen is soft.      Tenderness: There is no abdominal tenderness. There is no guarding.   Musculoskeletal:         General: Normal range of motion.      Right lower leg: No edema.      Left lower leg: No edema.   Skin:     General: Skin is warm and dry.   Neurological:      Mental Status: He is alert and oriented to person, place, and time. Mental status is at baseline.   Psychiatric:         Mood and Affect: Mood normal.         Behavior: Behavior normal.           Significant Labs: All pertinent labs within the past 24 hours have been reviewed.    Significant Imaging: I have reviewed all pertinent imaging results/findings within the past 24 hours.    Assessment/Plan:      Acute on chronic diastolic heart failure  Hypertensive emergency  Type II NSTEMI (demand ischemia)   Last echo (Mar 2024) w/ EF 55% & diastolic dysfunction. Presented w/ chest congestion & nonproductive cough x few weeks w/ interval development of L-sided chest discomfort x 1 day. Hypertensive (SBP 160s-180s) on arrival w/ initial BNP 1542 & troponin 0.161 (w/ uptrend on repeat). Started on heparin & NTG ggt in ED. EKG w/ nonspecific ST/T wave changes. Chest imaging w/ pulmonary edema. Initial weight 76.2kg (approx dry weight 72.6kg?). Continued dramatic uptrend in troponin along w/ rapid increase in O2 needs (up to 10L HFNC while in ED). Pt w/ notable CAD hx raising c/f ACS etiology. Reviewed by Cardiology who did not appreciate acute changes on EKG; favored demand ischemia 2/2 volume overload & HTN (along w/ poor renal clearance of troponin), but advised continuing heparin ggt for time being. NTG ggt d/c'ed & pt remained pain-free. Received home BP meds (w/ uptitration) & HD for volume removal. Troponin peaked at 12. TTE w/ severe LA dilation & CVP 15mmHg. Gradual  downtrend in O2 needs following repeat HD w/ volume removal & improved BP control.   - Cardiology consulted; appreciate recs  - Continue heparin ggt while inpatient   - Continue home DAPT + statin   - BP mgmt as below; goal SBP < 160  - Volume mgmt w/ HD as below  - No longer trending troponin   - Strict I/Os & daily weights  - Needs updated dry weight   - Fluid (1.5L/day) & sodium (2g) restriction  - Monitor BMP w/ electrolyte replacement PRN  - Telemetry     Acute respiratory failure with hypoxia  Presented w/ few months of chest congestion & dry cough, but no SOB. On arrival, SpO2 88% on RA. Initially stabilized on 2L NC, but had rapid increase in O2 needs (up to 10L HFNC) while still in ED. CTA negative for PE but w/ b/l ground-glass airspace opacities s/o pulmonary edema w/ possible superimposed PNA. Significantly decreased breath sounds on exam, but no accessory muscle use or e/o respiratory distress. Suspect primarily hypervolemia etiology, but poss superimposed PNA considered. Remains afebrile w/ nml WBC. Completed 5-day course of azithro + CTX. Decreased O2 needs following repeat HD w/ volume removal & BP control.   - CHF & BP mgmt as above/below  - Volume mgmt as below   - Supportive care w/ tessalon & PRN duo-nebs  - Supplemental O2 PRN for goal SpO2 > 92%     ESRD on hemodialysis  Hx of hypertensive nephropathy. Pt has been receiving HD since 2013. Anuric at baseline. Endorses compliance w/ HD; last run prior to admission was 6/5. Received HD on admission (6/7) w/ removal of 2L and addn'l run on 6/8 w/ removal of 3.5L for mgmt of AHRF/pulmonary edema 2/2 CHF exacerbation, likely d/t uncontrolled BP.   - Nephrology consulted; appreciate recs  - Continue HD M/W/F per nephrology (w/ addn'l runs PRN pending volume status/O2 needs)  - Continue home sevelamer, cinacalcet & nephrovite  - Home phos-binder not on hospital formulary  - Monitor RFP & daily weights   - Will need updated dry weight     Essential HTN    Hx of chronic poorly-controlled HTN (w/ complications of CVA & ESRD), managed w/ home regimen of carvedilol, nifedipine, Imdur & hydralazine (and volume mgmt w/ HD). Pt reports medication & HD compliance. Hypertensive on arrival (SBP 160s-180s). Suspect uncontrolled HTN largely contributing to presentation.   - Continue home carvedilol 50mg BID  - Continue home nifedipine 90mg   - Increase home Imdur to 120mg daily   - Increase home hydralazine to 100mg TID   - Start minoxidil 5mg daily   - Volume removal as above  - PRN hydralazine & labetolol for goal SBP < 160  - Consider addition of clonidine if needed; ACE/ARBs & diuretics will be of no utility as pt is s/p b/l nephrectomies    Coronary artery disease  Notable CAD hx w/ multiple presentations/admissions for chest pain and NSTEMI/elevated troponin. HILLARY placed to RCA in 2020. Most recently, [facility] (Nov 2023) noted 2-vessel dx w/ 90% stenosis of LAD &  of RCA. Apparently pt's anatomy is not amenable to revascularization, and has been advised per cardiology to manage medically. Presented w/ L-sided chest pain and significant troponin elevation (see above). Evaluated by cardiology who did not appreciate any acute ischemic changes on EKGs and favored demand ischemia 2/2 hypertensive urgency/emergency & volume overload w/ resulting hypoxia.   - Continue heparin ggt for now  - Continue home DAPT & statinm  - Continue home BB  - BP mgmt as above    NIDDM2   Hx of well-controlled T2DM with diet alone (not on insulin or oral agents); last A1c 4.5% (Apr 2024). Serum glucose 105 on admission.   - Deferring SSI & CBGs given euglycemia   - Hypoglycemic protocol     Hx of RCC  S/p bilateral nephrectomies   Hx of RCC- first of R kidney in 2022 (s/p R nephrectomy May 2022), then of L kidney in 2023 (s/p L nephrectomy in 2023).   - HD as above    Hx of CVA  R-sided weakness  Remote CVA hx (2013?) re: uncontrolled HTN w/ residual R-sided weakness. No acute issues/concerns on  current admission.   - Continue ASA & statin  - BP control as above      VTE Risk Mitigation (From admission, onward)           Ordered     Reason for No Pharmacological VTE Prophylaxis  Once        Question:  Reasons:  Answer:  Physician Provided (leave comment)  Comment:  on heparin gtt    06/07/24 0931     IP VTE HIGH RISK PATIENT  Once         06/07/24 0931     heparin 25,000 units in dextrose 5% (100 units/ml) IV bolus from bag LOW INTENSITY nomogram - [facility]  As needed (PRN)        Question:  Heparin Infusion Adjustment (DO NOT MODIFY ANSWER)  Answer:  \\[email or web address]\epic\Images\Pharmacy\HeparinInfusions\heparin LOW INTENSITY nomogram for [facility] [id number]    06/07/24 0729     heparin 25,000 units in dextrose 5% (100 units/ml) IV bolus from bag LOW INTENSITY nomogram - [facility]  As needed (PRN)        Question:  Heparin Infusion Adjustment (DO NOT MODIFY ANSWER)  Answer:  \\[email or web address]\epic\Images\Pharmacy\HeparinInfusions\heparin LOW INTENSITY nomogram for [facility] [id number]    06/07/24 0729     heparin 25,000 units in dextrose 5% 250 mL (100 units/mL) infusion LOW INTENSITY nomogram - [facility]  Continuous        Question:  Begin at (units/kg/hr)  Answer:  12 06/07/24 0729                    Discharge Planning   QUIN: 6/12/2024     Code Status: Full Code   Is the patient medically ready for discharge?:     Reason for patient still in hospital (select all that apply): Patient trending condition and Treatment  Discharge Plan A: Home          Kassi Joseph MD  Department of Hospital Medicine   [facility] - Cardiology Stepdown

## 2024-06-12 VITALS
TEMPERATURE: 98 F | RESPIRATION RATE: 18 BRPM | HEIGHT: 67 IN | DIASTOLIC BLOOD PRESSURE: 87 MMHG | SYSTOLIC BLOOD PRESSURE: 143 MMHG | HEART RATE: 68 BPM | WEIGHT: 156.5 LBS | OXYGEN SATURATION: 98 % | BODY MASS INDEX: 24.56 KG/M2

## 2024-06-12 LAB
ALBUMIN SERPL BCP-MCNC: 2.5 G/DL (ref 3.5–5.2)
ANION GAP SERPL CALC-SCNC: 16 MMOL/L (ref 8–16)
APTT PPP: 54.6 SEC (ref 21–32)
BACTERIA BLD CULT: NORMAL
BACTERIA BLD CULT: NORMAL
BUN SERPL-MCNC: 17 MG/DL (ref 6–20)
BUN SERPL-MCNC: 60 MG/DL (ref 6–20)
CALCIUM SERPL-MCNC: 9.7 MG/DL (ref 8.7–10.5)
CHLORIDE SERPL-SCNC: 92 MMOL/L (ref 95–110)
CO2 SERPL-SCNC: 22 MMOL/L (ref 23–29)
CREAT SERPL-MCNC: 11.5 MG/DL (ref 0.5–1.4)
ERYTHROCYTE [DISTWIDTH] IN BLOOD BY AUTOMATED COUNT: 15.8 % (ref 11.5–14.5)
EST. GFR  (NO RACE VARIABLE): 5 ML/MIN/1.73 M^2
GLUCOSE SERPL-MCNC: 84 MG/DL (ref 70–110)
HCT VFR BLD AUTO: 26.1 % (ref 40–54)
HGB BLD-MCNC: 8.3 G/DL (ref 14–18)
MAGNESIUM SERPL-MCNC: 2 MG/DL (ref 1.6–2.6)
MCH RBC QN AUTO: 29 PG (ref 27–31)
MCHC RBC AUTO-ENTMCNC: 31.8 G/DL (ref 32–36)
MCV RBC AUTO: 91 FL (ref 82–98)
PHOSPHATE SERPL-MCNC: 5.8 MG/DL (ref 2.7–4.5)
PLATELET # BLD AUTO: 422 K/UL (ref 150–450)
PMV BLD AUTO: 9.6 FL (ref 9.2–12.9)
POTASSIUM SERPL-SCNC: 4.2 MMOL/L (ref 3.5–5.1)
RBC # BLD AUTO: 2.86 M/UL (ref 4.6–6.2)
SODIUM SERPL-SCNC: 130 MMOL/L (ref 136–145)
WBC # BLD AUTO: 12.09 K/UL (ref 3.9–12.7)

## 2024-06-12 PROCEDURE — 25000003 PHARM REV CODE 250: Performed by: HOSPITALIST

## 2024-06-12 PROCEDURE — 63600175 PHARM REV CODE 636 W HCPCS: Performed by: HOSPITALIST

## 2024-06-12 PROCEDURE — 85027 COMPLETE CBC AUTOMATED: CPT | Performed by: STUDENT IN AN ORGANIZED HEALTH CARE EDUCATION/TRAINING PROGRAM

## 2024-06-12 PROCEDURE — 94761 N-INVAS EAR/PLS OXIMETRY MLT: CPT

## 2024-06-12 PROCEDURE — 63600175 PHARM REV CODE 636 W HCPCS: Performed by: STUDENT IN AN ORGANIZED HEALTH CARE EDUCATION/TRAINING PROGRAM

## 2024-06-12 PROCEDURE — 80069 RENAL FUNCTION PANEL: CPT | Performed by: STUDENT IN AN ORGANIZED HEALTH CARE EDUCATION/TRAINING PROGRAM

## 2024-06-12 PROCEDURE — 25000003 PHARM REV CODE 250: Performed by: INTERNAL MEDICINE

## 2024-06-12 PROCEDURE — 80100016 HC MAINTENANCE HEMODIALYSIS

## 2024-06-12 PROCEDURE — 84520 ASSAY OF UREA NITROGEN: CPT | Performed by: STUDENT IN AN ORGANIZED HEALTH CARE EDUCATION/TRAINING PROGRAM

## 2024-06-12 PROCEDURE — 25000003 PHARM REV CODE 250: Performed by: PHYSICIAN ASSISTANT

## 2024-06-12 PROCEDURE — 25000003 PHARM REV CODE 250: Performed by: STUDENT IN AN ORGANIZED HEALTH CARE EDUCATION/TRAINING PROGRAM

## 2024-06-12 PROCEDURE — 83735 ASSAY OF MAGNESIUM: CPT | Performed by: STUDENT IN AN ORGANIZED HEALTH CARE EDUCATION/TRAINING PROGRAM

## 2024-06-12 PROCEDURE — 90935 HEMODIALYSIS ONE EVALUATION: CPT | Mod: ,,, | Performed by: NURSE PRACTITIONER

## 2024-06-12 PROCEDURE — 36415 COLL VENOUS BLD VENIPUNCTURE: CPT | Performed by: STUDENT IN AN ORGANIZED HEALTH CARE EDUCATION/TRAINING PROGRAM

## 2024-06-12 PROCEDURE — 85730 THROMBOPLASTIN TIME PARTIAL: CPT | Performed by: STUDENT IN AN ORGANIZED HEALTH CARE EDUCATION/TRAINING PROGRAM

## 2024-06-12 RX ORDER — NITROGLYCERIN 0.4 MG/1
0.4 TABLET SUBLINGUAL EVERY 5 MIN PRN
Qty: 100 TABLET | Refills: 0 | Status: SHIPPED | OUTPATIENT
Start: 2024-06-12 | End: 2025-06-12

## 2024-06-12 RX ORDER — MINOXIDIL 2.5 MG/1
5 TABLET ORAL 2 TIMES DAILY
Qty: 120 TABLET | Refills: 11 | Status: SHIPPED | OUTPATIENT
Start: 2024-06-12 | End: 2025-06-12

## 2024-06-12 RX ORDER — HYDRALAZINE HYDROCHLORIDE 100 MG/1
100 TABLET, FILM COATED ORAL EVERY 8 HOURS
Qty: 90 TABLET | Refills: 11 | Status: SHIPPED | OUTPATIENT
Start: 2024-06-12 | End: 2024-06-18

## 2024-06-12 RX ORDER — ISOSORBIDE MONONITRATE 120 MG/1
120 TABLET, EXTENDED RELEASE ORAL DAILY
Qty: 90 TABLET | Refills: 0 | Status: SHIPPED | OUTPATIENT
Start: 2024-06-12 | End: 2025-06-12

## 2024-06-12 RX ORDER — SEVELAMER CARBONATE 800 MG/1
1600 TABLET, FILM COATED ORAL
Qty: 180 TABLET | Refills: 11 | Status: SHIPPED | OUTPATIENT
Start: 2024-06-12 | End: 2025-06-11

## 2024-06-12 RX ORDER — CARVEDILOL 25 MG/1
50 TABLET ORAL 2 TIMES DAILY WITH MEALS
Qty: 360 TABLET | Refills: 3 | Status: SHIPPED | OUTPATIENT
Start: 2024-06-12 | End: 2025-06-07

## 2024-06-12 RX ADMIN — MINOXIDIL 5 MG: 2.5 TABLET ORAL at 12:06

## 2024-06-12 RX ADMIN — CINACALCET 30 MG: 30 TABLET ORAL at 07:06

## 2024-06-12 RX ADMIN — HEPARIN SODIUM 24 UNITS/KG/HR: 10000 INJECTION, SOLUTION INTRAVENOUS at 02:06

## 2024-06-12 RX ADMIN — NIFEDIPINE 90 MG: 60 TABLET, FILM COATED, EXTENDED RELEASE ORAL at 12:06

## 2024-06-12 RX ADMIN — ERYTHROPOIETIN 7720 UNITS: 4000 INJECTION, SOLUTION INTRAVENOUS; SUBCUTANEOUS at 10:06

## 2024-06-12 RX ADMIN — SEVELAMER CARBONATE 1600 MG: 800 TABLET, FILM COATED ORAL at 07:06

## 2024-06-12 RX ADMIN — RENO CAPS 1 CAPSULE: 100; 1.5; 1.7; 20; 10; 1; 150; 5; 6 CAPSULE ORAL at 12:06

## 2024-06-12 RX ADMIN — SEVELAMER CARBONATE 1600 MG: 800 TABLET, FILM COATED ORAL at 12:06

## 2024-06-12 RX ADMIN — CARVEDILOL 50 MG: 25 TABLET, FILM COATED ORAL at 12:06

## 2024-06-12 RX ADMIN — HYDRALAZINE HYDROCHLORIDE 100 MG: 50 TABLET ORAL at 05:06

## 2024-06-12 RX ADMIN — ATORVASTATIN CALCIUM 80 MG: 40 TABLET, FILM COATED ORAL at 12:06

## 2024-06-12 RX ADMIN — CLOPIDOGREL BISULFATE 75 MG: 75 TABLET ORAL at 12:06

## 2024-06-12 RX ADMIN — HYDRALAZINE HYDROCHLORIDE 100 MG: 50 TABLET ORAL at 02:06

## 2024-06-12 RX ADMIN — MUPIROCIN: 20 OINTMENT TOPICAL at 12:06

## 2024-06-12 RX ADMIN — ISOSORBIDE MONONITRATE 120 MG: 60 TABLET, EXTENDED RELEASE ORAL at 12:06

## 2024-06-12 RX ADMIN — ASPIRIN 81 MG CHEWABLE TABLET 81 MG: 81 TABLET CHEWABLE at 12:06

## 2024-06-12 RX ADMIN — EZETIMIBE 10 MG: 10 TABLET ORAL at 12:06

## 2024-06-12 NOTE — PROGRESS NOTES
Haroldo Dickinson   MR#:[id number]   RM:307/307 A  :1975  AGE:48 y.o.     Progress Note  Nephrology    Admit Date: 2024  Length of Stay: 4  Consult Requested By: Kassi Joseph MD  Reason for Consult:  ESRD admitted with NSTEMI    HPI    Haroldo Dickinson is a 47 yo male with CAD, CVA, RCC s/p bilateral nephrectomy, HTN, and ESRD on iHD MWF who presents to the hospital for CP evaluation. Nephrology has been consulted for ESRD management while IP.      Patient reports that his chest pain has been resolved since he came to the hospital      SUBJECTIVE:     Pt seen today in his room. Mentions no chest pain. On IV heparin Infusion. His dry weight close to 71 kilos    Review of Systems:  Constitutional: denies fever/weakness  Respiratory: denies SOB, cough   Cardiovascular: denies chest pain/palpitations   Gastrointestinal: denies N/V, diarrhea/constipation   Psych: denies confusion, depression    Patient Active Problem List   Diagnosis    Acute respiratory failure with hypoxia    NSTEMI (non-ST elevated myocardial infarction)    Cerebral microvascular disease    Hyperparathyroidism, secondary renal    Left ventricular hypertrophy due to hypertensive disease    Hemiparesis affecting right side as late effect of cerebrovascular accident    Renal hypertension    ESRD on hemodialysis    Coronary artery disease involving native coronary artery of native heart with unstable angina pectoris    Essential hypertension    Patient on waiting list for kidney transplant    Arteriovenous fistula    Hypertensive retinopathy, bilateral    Varicose veins of bilateral lower extremities with other complications    S/p nephrectomy    Anemia in end-stage renal disease    Chronic kidney disease-mineral and bone disorder    Cardiac murmur    Acute on chronic diastolic heart failure    PAD (peripheral artery disease)    Type 2 diabetes mellitus with chronic kidney disease on chronic dialysis, without long-term current use of insulin     H/o acute coronary syndrome with high troponin    H/o renal cell carcinoma of left kidney    Ambulatory dysfunction    Pleural effusion     Past Medical History:   Diagnosis Date    CAD (coronary artery disease), native coronary artery 11/21/2019    Cataract     Diabetes mellitus     Diabetic retinopathy     Dialysis patient     DM type 2 causing renal disease, not at goal     ESRD (end stage renal disease) started dialysis 01/2014 06/05/2014    History of colon polyps 02/10/2021    History of COVID-19 12/29/2022    Hyperparathyroidism, secondary renal 06/05/2014    Hypertension     NSTEMI (non-ST elevated myocardial infarction) 12/21/2013    Organ transplant candidate 06/05/2014    Pneumonia     Post PTCA 08/26/2020    RCA HILLARY 7-25-20    Renal cell carcinoma of right kidney     Renal hypertension     Stroke         OBJECTIVE:   Temp:  [97.6 °F (36.4 °C)-98.3 °F (36.8 °C)]   Pulse:  [69-88]   Resp:  [16-20]   BP: (112-154)/(69-82)   SpO2:  [93 %-100 %]       Intake/Output Summary (Last 24 hours) at 6/11/2024 1951  Last data filed at 6/11/2024 1833  Gross per 24 hour   Intake 1100 ml   Output --   Net 1100 ml         Physical Exam:  General Appearance: well developed, well nourished   HEENT: normocephalic, atraumatic    Eyes: conjunctivae/corneas clear. PERRL.   Oropharynx: MMM  Pulm:  Effort: normal   Auscultation: CTA B, no crackles/wheezes  Card:   Palpation:   Auscultation: RRR, S1/S2 nml   Ext. Edema:  Mild - moderate   GI: Tenderness/Masses: None    Other: None       Laboratory:  CBC with Diff:   Recent Labs   Lab 06/08/24  0552 06/09/24  0420 06/10/24  0349 06/11/24  0542   WBC 15.62* 13.49* 11.23 11.72   HGB 9.9* 9.4* 8.4* 8.9*   HCT 31.3* 30.2* 26.4* 28.0*    360 368 416   LYMPH 6.6*  1.0  --   --   --    MONO 9.3  1.5*  --   --   --    EOSINOPHIL 3.2  --   --   --      COAG:  Recent Labs   Lab 06/07/24  0756 06/07/24  1453 06/09/24  2131 06/10/24  0349 06/11/24  0542   APTT 28.3   < > 57.3* 48.0*  "49.3*   INR 1.0  --   --   --   --     < > = values in this interval not displayed.       CMP:   Recent Labs   Lab 06/07/24  0328 06/08/24  0552 06/09/24  0420 06/10/24  0349 06/11/24  0542      < > 80 135* 80   CALCIUM 10.6*   < > 10.4 9.8 9.6   ALBUMIN 3.0*  --  2.5* 2.3* 2.5*   PROT 7.4  --   --   --   --       < > 132* 132* 133*   K 3.7   < > 4.3 4.9 4.3   CO2 23   < > 19* 21* 25      < > 98 94* 93*   BUN 41*   < > 47* 71* 41*   CREATININE 9.4*   < > 10.5* 12.5* 8.9*   ALKPHOS 72  --   --   --   --    ALT 13  --   --   --   --    AST 16  --   --   --   --    BILITOT 0.7  --   --   --   --    MG  --    < > 2.1 2.1 2.0   PHOS  --    < > 5.7* 7.0* 5.7*    < > = values in this interval not displayed.     Estimated Creatinine Clearance: 9.7 mL/min (A) (based on SCr of 8.9 mg/dL (H)).  Corrected Calcium:      Cardiac markers: No results for input(s): "CKMB", "TROPONINT", "MYOGLOBIN" in the last 168 hours.  PT, PTT, INR    ABG  Recent Labs   Lab 06/07/24  0358   PH 7.393   PO2 51   PCO2 46.6*   HCO3 28.4*   BE 3*       Urinalysis:  No results for input(s): "COLORU", "CLARITYU", "SPECGRAV", "PHUR", "PROTEINUA", "GLUCOSEU", "BILIRUBINCON", "BLOODU", "WBCU", "RBCU", "BACTERIA", "MUCUS", "NITRITE", "LEUKOCYTESUR", "UROBILINOGEN", "HYALINECASTS" in the last 24 hours.  CMP:   Recent Labs   Lab 06/11/24  0542   GLU 80   CALCIUM 9.6   ALBUMIN 2.5*   *   K 4.3   CO2 25   CL 93*   BUN 41*   CREATININE 8.9*     LFTs:   Recent Labs   Lab 06/11/24  0542   ALBUMIN 2.5*     Coagulation:   Recent Labs   Lab 06/11/24  0542   APTT 49.3*           Hemoglobin A1C   Date Value Ref Range Status   04/11/2024 4.5 (L) 4.8 - 5.9 % Final   03/11/2024 4.7 4.0 - 5.6 % Final     Comment:     ADA Screening Guidelines:  5.7-6.4%  Consistent with prediabetes  >or=6.5%  Consistent with diabetes    High levels of fetal hemoglobin interfere with the HbA1C  assay. Heterozygous hemoglobin variants (HbS, HgC, etc)do  not " significantly interfere with this assay.   However, presence of multiple variants may affect accuracy.     11/27/2023 5.1 4.0 - 5.6 % Final     Comment:     ADA Screening Guidelines:  5.7-6.4%  Consistent with prediabetes  >or=6.5%  Consistent with diabetes    High levels of fetal hemoglobin interfere with the HbA1C  assay. Heterozygous hemoglobin variants (HbS, HgC, etc)do  not significantly interfere with this assay.   However, presence of multiple variants may affect accuracy.     05/02/2023 4.6 4.0 - 5.6 % Final     Comment:     ADA Screening Guidelines:  5.7-6.4%  Consistent with prediabetes  >or=6.5%  Consistent with diabetes    High levels of fetal hemoglobin interfere with the HbA1C  assay. Heterozygous hemoglobin variants (HbS, HgC, etc)do  not significantly interfere with this assay.   However, presence of multiple variants may affect accuracy.             ACCESS    ASSESSMENT/PLAN:   Principle Problem:  Acute on chronic diastolic heart failure  Active Hospital Problems    Diagnosis  POA    *Acute on chronic diastolic heart failure [I50.33]  Yes    Pleural effusion [J90]  Yes    H/o renal cell carcinoma of left kidney [C64.2]  Yes    Type 2 diabetes mellitus with chronic kidney disease on chronic dialysis, without long-term current use of insulin [E11.22, N18.6, Z99.2]  Not Applicable    Coronary artery disease involving native coronary artery of native heart with unstable angina pectoris [I25.110]  Yes    Essential hypertension [I10]  Yes    ESRD on hemodialysis [N18.6, Z99.2]  Not Applicable    Hemiparesis affecting right side as late effect of cerebrovascular accident [I69.351]  Not Applicable    NSTEMI (non-ST elevated myocardial infarction) [I21.4]  Yes    Acute respiratory failure with hypoxia [J96.01]  Yes      Resolved Hospital Problems   No resolved problems to display.       A/P:    1. End stage renal failure on dialysis  ESRD on iHD MWF  Dr. Butt  4 hours  ALBARO AVF  EDW ~ 71 kg  No residual  renal fx      Pt received HD session 6/10. 3 lit net negative UF pulled. Will plan on next HD session Wednesday, 6/12       Anemia of renal disease   H and H at 8.4/26   Would check Iron saturation , ferritin   Would start EPO  IU/kg      Anion gap metabolic acidosis   Bicarb 21   Would increase bicarb bath on HD     Hyperphosphatemia   Phosp down to 5.7 from 7.0   Continue sevelamer     Secondary Hyperparathyroidism    PTH  at 166  Will continue to monitor for now      NSTEMI   On heparin infusion along with GDMT including aspirin, plavix   Cardiology on board.     Will plan on next HD session Wednesday     Aranza Bennett MD  Nephrology  [facility].

## 2024-06-12 NOTE — NURSING
Pt to [facility] via wheelchair for maintenance HD. Tx started via RFA AVF with 15ga needles. Heparin gtt running per order. VSS. Pt offers no complaints at this time.

## 2024-06-12 NOTE — PLAN OF CARE
CHW met with patient/family at bedside. Patient experience rounding completed and reviewed the following.     Do you know your discharge plan? Yes or No,    If yes, what is the plan? (Home, Home Health, Rehab, SNF, LTAC, or Other) Yes Home    Have you discussed your needs and preferences with your SW/CM? Yes or No  Yes Home    If you are discharging home, do you have help at home? Yes or No Yes    Do you think you will need help additional at home at discharge? Yes or No  No    Do you currently have difficulty keeping up with bills, affording medicine or buying food? Yes or No No    Assigned SW/CM notified of any patient/family needs or concerns. Appropriate resources provided to address patient's needs.         Juanis Riley CHW  Case Management  [phone]

## 2024-06-12 NOTE — PLAN OF CARE
06/12/24 1631   Final Note   Assessment Type Final Discharge Note   Anticipated Discharge Disposition Home   What phone number can be called within the next 1-3 days to see how you are doing after discharge? [phone]   Hospital Resources/Appts/Education Provided Provided patient/caregiver with written discharge plan information;Provided education on problems/symptoms using teachback;Appointments scheduled and added to AVS;Community resources provided   Post-Acute Status   Discharge Delays None known at this time     Patient to discharge home / self care . Patient to resume regular scheduled dialysis treatments as before with next treatment this Friday at [facility] . Patient dry weight per nurse post dialysis 71 KG.    Amanda Perry RN    [phone]    Future Appointments   Date Time Provider Department Center   6/18/2024  8:00 AM Mireya Larose MD [facility] MED [place]   6/18/2024 10:30 AM Cindy Hansen PT [facility]   6/25/2024  3:30 PM Cindy Hansen PT [facility]   6/27/2024 11:40 AM Luke Gonzalez Jr., MD [facility] CARDIO [place]   6/27/2024  3:30 PM Cindy Hansen PT [facility]   8/1/2024  9:30 AM Aisha Olsen DPM [facility] POD [place]   8/1/2024  2:00 PM Torrie Burr OD [facility] OPTOMTY [place]

## 2024-06-12 NOTE — DISCHARGE INSTRUCTIONS
To better control your blood pressure, the following changes have been made:     - START taking minoxidil 5mg 2x/day.     - INCREASE your hydralazine to 100mg 3x/day.     - INCREASE your isosorbide mononitrate (aka Imdur) to 120mg daily.     - Continue taking your carvedilol 50mg 2x/day and nifedipine 90mg daily.     - Make sure you are not drinking more than 1.5L (about 6-7 cups) fluid per day and limiting your salt intake.     Your dose of sevelamer (aka Renvela) has also been increased from 800mg 3x/day to 1,600mg 3x/day.     Otherwise continue to take the rest of your prior medications as prescribed.     Continue getting dialysis Monday, Wednesday & Friday  Follow-up with your Cardiologist.

## 2024-06-12 NOTE — NURSING
3.5hr HD complete. 3L removed. RFA AVF dressing CDI. Report given to Katty PEARCE. Pt awaiting transport to room via wheelchair. Heparin gtt remains running per order.

## 2024-06-12 NOTE — PROGRESS NOTES
[facility] HEMODIALYSIS NOTE     Patient currently on hemodialysis for removal of uremic toxins and volume.     Patient seen and evaluated on hemodialysis, tolerating treatment, see HD flowsheet for vitals and assessments.     Labs have been reviewed and the dialysate bath has been adjusted.        Assessment/Plan:     -Patient seen on HD, tolerating treatment well, w/o complaints   -Patient with no chest pain/shortness of breath today, breathing comfortably on RA.  -UF goal of 3 L  -Renal diet, if not NPO   -Strict I/O's and daily weights  -Fluid restriction (1.5 L daily)  -Daily renal function panels  -Keep MAP >65 while on HD   -Hgb goal 10-11, hgb 8.3 today, may continue EPO  -Phos 5.8, continue binders  -Will continue to follow while inpatient      SERGIO Mancilla DNP, APRN, FNP-C  Department of Nephrology  [facility]  Pager: [phone]

## 2024-06-14 ENCOUNTER — PATIENT OUTREACH (OUTPATIENT)
Dept: ADMINISTRATIVE | Facility: CLINIC | Age: 49
End: 2024-06-14
Payer: MEDICARE

## 2024-06-14 NOTE — PROGRESS NOTES
C3 nurse spoke with Haroldo Dickinson  for a TCC post hospital discharge follow up call. The patient has a scheduled HOSFU appointment with Mireya Larose MD  on 6/18/24 @ 0800.

## 2024-06-17 ENCOUNTER — TELEPHONE (OUTPATIENT)
Dept: FAMILY MEDICINE | Facility: CLINIC | Age: 49
End: 2024-06-17
Payer: MEDICARE

## 2024-06-18 ENCOUNTER — OFFICE VISIT (OUTPATIENT)
Dept: FAMILY MEDICINE | Facility: CLINIC | Age: 49
End: 2024-06-18
Payer: MEDICARE

## 2024-06-18 VITALS
TEMPERATURE: 98 F | HEIGHT: 67 IN | DIASTOLIC BLOOD PRESSURE: 62 MMHG | WEIGHT: 165.13 LBS | OXYGEN SATURATION: 96 % | HEART RATE: 79 BPM | BODY MASS INDEX: 25.92 KG/M2 | SYSTOLIC BLOOD PRESSURE: 104 MMHG

## 2024-06-18 DIAGNOSIS — I69.351 HEMIPARESIS AFFECTING RIGHT SIDE AS LATE EFFECT OF CEREBROVASCULAR ACCIDENT: ICD-10-CM

## 2024-06-18 DIAGNOSIS — M79.602 LEFT ARM PAIN: ICD-10-CM

## 2024-06-18 DIAGNOSIS — F51.04 PSYCHOPHYSIOLOGICAL INSOMNIA: ICD-10-CM

## 2024-06-18 DIAGNOSIS — I10 ESSENTIAL HYPERTENSION: Primary | ICD-10-CM

## 2024-06-18 PROBLEM — J90 PLEURAL EFFUSION: Status: RESOLVED | Noted: 2024-06-07 | Resolved: 2024-06-18

## 2024-06-18 PROCEDURE — 3008F BODY MASS INDEX DOCD: CPT | Mod: CPTII,S$GLB,, | Performed by: INTERNAL MEDICINE

## 2024-06-18 PROCEDURE — 99214 OFFICE O/P EST MOD 30 MIN: CPT | Mod: S$GLB,,, | Performed by: INTERNAL MEDICINE

## 2024-06-18 PROCEDURE — 3078F DIAST BP <80 MM HG: CPT | Mod: CPTII,S$GLB,, | Performed by: INTERNAL MEDICINE

## 2024-06-18 PROCEDURE — 3066F NEPHROPATHY DOC TX: CPT | Mod: CPTII,S$GLB,, | Performed by: INTERNAL MEDICINE

## 2024-06-18 PROCEDURE — 99999 PR PBB SHADOW E&M-EST. PATIENT-LVL IV: CPT | Mod: PBBFAC,,, | Performed by: INTERNAL MEDICINE

## 2024-06-18 PROCEDURE — 3044F HG A1C LEVEL LT 7.0%: CPT | Mod: CPTII,S$GLB,, | Performed by: INTERNAL MEDICINE

## 2024-06-18 PROCEDURE — 3074F SYST BP LT 130 MM HG: CPT | Mod: CPTII,S$GLB,, | Performed by: INTERNAL MEDICINE

## 2024-06-18 PROCEDURE — 1160F RVW MEDS BY RX/DR IN RCRD: CPT | Mod: CPTII,S$GLB,, | Performed by: INTERNAL MEDICINE

## 2024-06-18 PROCEDURE — 1159F MED LIST DOCD IN RCRD: CPT | Mod: CPTII,S$GLB,, | Performed by: INTERNAL MEDICINE

## 2024-06-18 PROCEDURE — 1111F DSCHRG MED/CURRENT MED MERGE: CPT | Mod: CPTII,S$GLB,, | Performed by: INTERNAL MEDICINE

## 2024-06-18 RX ORDER — DEXTROMETHORPHAN HYDROBROMIDE, GUAIFENESIN 5; 100 MG/5ML; MG/5ML
650 LIQUID ORAL DAILY PRN
Qty: 90 TABLET | Refills: 0 | Status: SHIPPED | OUTPATIENT
Start: 2024-06-18 | End: 2024-09-16

## 2024-06-18 RX ORDER — TALC
3 POWDER (GRAM) TOPICAL NIGHTLY
Qty: 30 TABLET | Refills: 2 | Status: SHIPPED | OUTPATIENT
Start: 2024-06-18 | End: 2024-09-16

## 2024-06-18 RX ORDER — HYDRALAZINE HYDROCHLORIDE 100 MG/1
100 TABLET, FILM COATED ORAL EVERY 12 HOURS
Qty: 60 TABLET | Refills: 2 | Status: SHIPPED | OUTPATIENT
Start: 2024-06-18 | End: 2024-09-16

## 2024-06-18 NOTE — ASSESSMENT & PLAN NOTE
Chronic, still reports mild weakness which has led to ambulatory dysfunction  Went for 1 PT session, scheduled for two more.

## 2024-06-18 NOTE — DISCHARGE SUMMARY
"[facility] - Cardiology [facility] Medicine  Discharge Summary      Patient Name: Haroldo Dickinson  MRN: [id number]  SADIE: [id number]  Patient Class: IP- Inpatient  Admission Date: 6/7/2024  Hospital Length of Stay: 5 days  Discharge Date and Time: 6/12/2024  4:35 PM  Attending Physician: Kassi Joseph MD  Discharging Provider: Kassi Joseph MD  Primary Care Provider: Mireya Larose MD  [facility] Medicine Team: [facility] HOSP MED    Primary Care Team: [facility] MED       HPI:   Haroldo Dickinson is a 48 y.o. male w/ PMHx of CAD (s/p HILLARY to RCA in 2020), HTN, HFpEF, ESRD on dialysis, hx of RCC (s/p bilateral nephrectomies), T2DM, and hx of CVA (w/ residual R-sided deficits), who presented to [facility] ED on 6/7/24 via EMS for evaluation of chest pain. Pt reports sudden onset L-sided chest discomfort while at rest (sitting in chair) the night before. Describes sensation of his heart "beating out of [his] chest." Reports some relief w/ SL NTG, but w/ recurrence shortly afterwards prompting pt to call EMS. Pt also endorses LUE pain for past few weeks, as well as chest congestion w/ nonproductive cough. Reports chest pain is exacerbated w/ recent coughing. Denies fevers, chills, lightheadedness, LE swelling, SOB, headache, vision changes, N/V, or syncope.     Of note, pt has known severe CAD. Had HILLARY placed to RCA in 2020. More recently w/ [facility] in Nov 2023 noting 2-vessel dx, including 90% stenosis of LAD &  of RCA. Has had multiple presentations/admissions for chest pain and NSTEMI/elevated troponin. Per chart review, cardiology has noted that pt's anatomy is not amenable to revascularization, and has advised continued medical mgmt. Reports compliance w/ home meds & dialysis. Last run 6/5 as normal (however 6/3 run apparently didn't pull fluid).     On arrival to ED, pt afebrile, hypertensive (SBP 160s-180s), mildly tachycardic (HR 90s-100s), & mildly tachypneic (RR 18-22) w/ SpO2 88% on RA (placed on 2L NC w/ improvement). " Labs notable for D-dimer 4.14, BNP 1542, troponin 0.161, WBC 12.2, Hgb 9.7, Cr 9.4, & BUN 41. CTA negative for PE but w/ b/l ground-glass airspace opacities s/o pulmonary edema w/ possible superimposed PNA. Received SL NTG 0.4mg, IV dilaudid 0.5mg, & IV Zofran 4mg. Repeat troponin uptrending to 0.312. Pt w/o pain. Started on heparin & NTG ggts. EKG w/ nonspecific ST/T wave changes. Reviewed by cardiology w/ low suspicion for ACS etiology. Received home Imdur 120mg, nifedipine 90mg, carvedilol 50mg, ASA 81mg, Plavix 75mg, & atorvastatin 80mg. NTG ggt d/c'ed. Also started on ceftriaxone & azithromycin. Pt admitted to Hospital Medicine service for further workup & mgmt of AHRF 2/2 CHF exacerbation w/ possible PNA.       Hospital Course:   Rapid & dramatic uptrend in troponin (0.312 --> 3.074 within 4hrs). EKG stable & pt w/o active chest pain. Rapid increase in O2 needs as well (2L NC --> 10L HFNC while still in ED). Notably diminished breath sounds on eam, but luckily no respiratory distress, increased WOB or SOB (just dry cough); see Plan of Care note for details. Underwent bedside HD in ED w/ removal of 2L, although pt remained on 10L HFNC even w/ volume removal. Troponin peaked at 12. Pt requiring up to 12L HFNC the following morning. BP regimen escalated (increased scheduled hydralazine). Pt had addn'l run of HD on 6/8 w/ removal of 3.5L and subsequent decreased O2 needs (down to 6L HFNC the following AM). Continued downtrend in O2 needs w/ volume removal via HD & improved BP control. Had addn'l 3L removed on 6/10 & again on 6/12. Some continued crackles on auscultation, but exam much improved and pt stable on RA on discharge; weight 71 kg. Discharged home on increased BP regimen (summarized in patient instructions below), to continue regular M/W/F HD for continued volume removal, and follow-up with outpatient cardiology.     Details of individual problems addressed during hospitalization discussed below.        Goals of Care Treatment Preferences:  Code Status: Full Code  What is most important right now is to focus on symptom/pain control, extending life as long as possible, even it it means sacrificing quality.  Accordingly, we have decided that the best plan to meet the patient's goals includes continuing with treatment.      Consults:   Consults (From admission, onward)          Status Ordering Provider     Inpatient consult to Nephrology  Once        Provider:  (Not yet assigned)    Completed ANDREA, MARTÍN     Inpatient consult to Cardiology  Once        Provider:  (Not yet assigned)    Completed ANDREA, MARTÍN            PROBLEMS ADDRESSED DURING ADMISSION:     Acute on chronic diastolic heart failure  Hypertensive emergency - resolved   Type II NSTEMI (demand ischemia) - resolved   Last echo (Mar 2024) w/ EF 55% & diastolic dysfunction. Presented w/ chest congestion & nonproductive cough x few weeks w/ interval development of L-sided chest discomfort x 1 day. Hypertensive (SBP 160s-180s) on arrival w/ initial BNP 1542 & troponin 0.161 (w/ uptrend on repeat). Started on heparin & NTG ggt in ED. EKG w/ nonspecific ST/T wave changes. Chest imaging w/ pulmonary edema. Initial weight 76.2kg (approx dry weight 72.6kg?). Continued dramatic uptrend in troponin along w/ rapid increase in O2 needs (up to 10L HFNC while in ED). Pt w/ notable CAD hx raising c/f ACS etiology. Reviewed by Cardiology who did not appreciate acute changes on EKG; favored demand ischemia 2/2 volume overload & HTN (along w/ poor renal clearance of troponin), but advised continuing heparin ggt while pt inpatient. NTG ggt d/c'ed & pt remained pain-free. Received home BP meds (w/ uptitration) & HD for volume removal. Troponin peaked at 12. TTE w/ severe LA dilation & CVP 15mmHg. Gradual downtrend in O2 needs following repeat HD w/ volume removal & improved BP control.   - Continue home DAPT + statin   - BP mgmt as below  - Volume mgmt w/ HD as below  -  Needs updated dry weight (71kg on discharge, although still w/ crackles on exam suggesting pt not quite euvolemic)  - Pt advised of fluid & salt restriction   - Follow-up with outpatient cardiology      Acute respiratory failure with hypoxia  - resolved   Presented w/ few months of chest congestion & dry cough, but no SOB. On arrival, SpO2 88% on RA. Initially stabilized on 2L NC, but had rapid increase in O2 needs (up to 10L HFNC) while still in ED. CTA negative for PE but w/ b/l ground-glass airspace opacities s/o pulmonary edema w/ possible superimposed PNA. Significantly decreased breath sounds on exam, but no accessory muscle use or e/o respiratory distress. Suspect primarily hypervolemia etiology, but poss superimposed PNA considered. Remains afebrile w/ nml WBC. Completed 5-day course of azithro + CTX. Decreased O2 needs following repeat HD w/ volume removal & BP control. Still w/ significant crackles on exam on discharge, although notably improved from prior & pt stable on RA.   - CHF & BP mgmt as above/below  - Volume mgmt as below       ESRD on hemodialysis  Hx of hypertensive nephropathy. Pt has been receiving HD since 2013. Anuric at baseline. Endorses compliance w/ HD; last run prior to admission was 6/5. Received HD on admission (6/7) w/ removal of 2L and addn'l run on 6/8 w/ removal of 3.5L for mgmt of AHRF/pulmonary edema 2/2 CHF exacerbation, likely d/t uncontrolled BP. Resumed regular M/W/F HD schedule with removal of 3L on 6/8 & 6/10.   - Continue HD M/W/F w/ further volume removal as VS allow  - Continue home sevelamer, cinacalcet, nephrovite & velphoro  - Will need updated dry weight (71kg on discharge, although still w/ crackles on exam suggesting pt not quite euvolemic)     Essential HTN   Hx of chronic poorly-controlled HTN (w/ complications of CVA & ESRD), managed w/ home regimen of carvedilol, nifedipine, Imdur & hydralazine (and volume mgmt w/ HD). Pt reports medication & HD compliance.  Hypertensive on arrival (SBP 160s-180s). Suspect uncontrolled HTN largely contributing to presentation. BP regimen uptitrated w/ improved BP control (SBP 120s-140s), although still w/ hypertension (up to 170s) during HD runs.   - Continue home carvedilol 50mg BID & nifedipine 90mg   - Increase home Imdur to 120mg daily   - Increase home hydralazine to 100mg TID   - Start minoxidil 5mg BID  - Volume removal as above  - May need to consider pre-HD labetolol given elevated BP during HD runs  - If BP remains elevated despite regimen above, could consider addition of clonidine if needed; ACE/ARBs & diuretics will be of no utility as pt is s/p b/l nephrectomies    Coronary artery disease  Notable CAD hx w/ multiple presentations/admissions for chest pain and NSTEMI/elevated troponin. HILLARY placed to RCA in 2020. Most recently, [facility] (Nov 2023) noted 2-vessel dx w/ 90% stenosis of LAD &  of RCA. Apparently pt's anatomy is not amenable to revascularization, and has been advised per cardiology to manage medically. Presented w/ L-sided chest pain and significant troponin elevation (see above). Evaluated by cardiology who did not appreciate any acute ischemic changes on EKGs and favored demand ischemia 2/2 hypertensive urgency/emergency & volume overload w/ resulting hypoxia, but advised continuing heparin ggt while inpatient. Troponin peaked at 12 w/ subsequent downtrend. No recurrent chest pain during hospitalization.   - Continue home DAPT & statinm  - Continue home BB  - BP mgmt as above     NIDDM2   Hx of well-controlled T2DM with diet alone (not on insulin or oral agents); last A1c 4.5% (Apr 2024). Serum glucose 105 on admission. Remained euglycemic throughout hospitalization.      Hx of RCC  S/p bilateral nephrectomies   Hx of RCC- first of R kidney in 2022 (s/p R nephrectomy May 2022), then of L kidney in 2023 (s/p L nephrectomy in 2023).   - HD as above     Hx of CVA  R-sided weakness  Remote CVA hx (2013?) re:  uncontrolled HTN w/ residual R-sided weakness. No acute issues/concerns on current admission.   - Continue ASA & statin  - BP control as above      Final Active Diagnoses:    Diagnosis Date Noted POA    PRINCIPAL PROBLEM:  Acute on chronic diastolic heart failure [I50.33] 05/02/2023 Yes    H/o renal cell carcinoma of left kidney [C64.2] 04/04/2024 Yes    Type 2 diabetes mellitus with chronic kidney disease on chronic dialysis, without long-term current use of insulin [E11.22, N18.6, Z99.2] 03/10/2024 Not Applicable    Coronary artery disease involving native coronary artery of native heart with unstable angina pectoris [I25.110] 11/21/2019 Yes    Essential hypertension [I10] 11/21/2019 Yes    ESRD on hemodialysis [N18.6, Z99.2] 07/23/2019 Not Applicable    Hemiparesis affecting right side as late effect of cerebrovascular accident [I69.351] 02/15/2016 Not Applicable      Problems Resolved During this Admission:    Diagnosis Date Noted Date Resolved POA    Pleural effusion [J90] 06/07/2024 06/18/2024 Yes    NSTEMI (non-ST elevated myocardial infarction) [I21.4] 12/21/2013 06/18/2024 Yes    Acute respiratory failure with hypoxia [J96.01] 12/21/2013 06/18/2024 Yes       Discharged Condition: fair  PHYSICAL EXAM   GEN:  Awake, sitting comfortably in bed, and in no acute distress.   HENT: NC/NT, MMM. Oropharynx clear w/o erythema or exudate.   EYES: PERRL, EOM intact. Anicteric sclera & non-erythematous conjunctivae.   CV: Normal rate & rhythm. No murmurs, rubs, or gallops.   RESP: Diminished bibasilar breath sounds (notably improved from prior) w/ coarse crackles up to mid-lung fields & scattered wheezes.  Normal respiratory effort.   GI: Soft, nontender & nondistended. Bowel sounds present.  EXT: No edema, clubbing, or cyanosis.  NEURO: A&Ox4. No focal neurological deficits.   PSYCH: Normal mood & affect.       Disposition: Home or Self Care    Follow Up:   Follow-up Information       CNA - [facility]  "Follow up.    Why: Follow up with your regular scheduled treatments for dialysis M-W-F as before with next being this friday.  Contact information:  [address] [place] [address] [phone]                         Patient Instructions:  (Per AVS)   To better control your blood pressure, the following changes have been made:   - START taking minoxidil 5mg 2x/day.   - INCREASE your hydralazine to 100mg 3x/day.   - INCREASE your isosorbide mononitrate (aka Imdur) to 120mg daily.   - Continue taking your carvedilol 50mg 2x/day and nifedipine 90mg daily.   - Make sure you are not drinking more than 1.5L (about 6-7 cups) fluid per day and limiting your salt intake.     Your dose of sevelamer (aka Renvela) has also been increased from 800mg 3x/day to 1,600mg 3x/day.     Otherwise continue to take the rest of your prior medications as prescribed.     Continue getting dialysis Monday, Wednesday & Friday  Follow-up with your Cardiologist.       Significant Diagnostic Studies: N/A ; as discussed in "Hospital Course" above    Pending Diagnostic Studies:       None           Medications:  Reconciled Home Medications:      Medication List        START taking these medications      epoetin philip 4,000 unit/mL injection  Commonly known as: PROCRIT  Inject 1.93 mLs (7,720 Units total) into the skin every Mon, Wed, Fri.     minoxidiL 2.5 MG tablet  Commonly known as: LONITEN  Take 2 tablets (5 mg total) by mouth 2 (two) times daily.            CHANGE how you take these medications      clopidogreL 75 mg tablet  Commonly known as: PLAVIX  Take 1 tablet (75 mg total) by mouth once daily.  What changed: Another medication with the same name was removed. Continue taking this medication, and follow the directions you see here.     isosorbide mononitrate 120 MG 24 hr tablet  Commonly known as: IMDUR  Take 1 tablet (120 mg total) by mouth once daily.  What changed:   medication strength  how much to take     sevelamer " carbonate 800 mg Tab  Commonly known as: RENVELA  Take 2 tablets (1,600 mg total) by mouth 3 (three) times daily with meals.  What changed: how much to take            CONTINUE taking these medications      aspirin 81 MG EC tablet  Commonly known as: ECOTRIN  Take 1 tablet (81 mg total) by mouth once daily.     atorvastatin 80 MG tablet  Commonly known as: LIPITOR  Take 1 tablet (80 mg total) by mouth once daily.     carvediloL 25 MG tablet  Commonly known as: COREG  Take 2 tablets (50 mg total) by mouth 2 (two) times daily with meals.     cinacalcet 30 MG Tab  Commonly known as: SENSIPAR  Take by mouth.     ezetimibe 10 mg tablet  Commonly known as: ZETIA  Take 1 tablet (10 mg total) by mouth once daily.     NIFEdipine 90 MG (OSM) 24 hr tablet  Commonly known as: PROCARDIA-XL  Take 1 tablet (90 mg total) by mouth once daily.     nitroGLYCERIN 0.4 MG SL tablet  Commonly known as: NITROSTAT  Place 1 tablet (0.4 mg total) under the tongue every 5 (five) minutes as needed for Chest pain.     polyethylene glycol 17 gram/dose powder  Commonly known as: GLYCOLAX  Take 17 g by mouth daily as needed for Constipation.     RENAPLEX 800 mcg- 12.5 mg Tab  Generic drug: vit B complex-C-folic ac-zinc  Take 1 tablet by mouth.     VELPHORO 500 mg Chew  Generic drug: sucroferric oxyhydroxide  Take 2 tablets by mouth.            STOP taking these medications      hydrALAZINE 50 MG tablet  Commonly known as: APRESOLINE              Indwelling Lines/Drains at time of discharge:   Lines/Drains/Airways       Drain  Duration                  Hemodialysis AV Fistula Right forearm -- days             Time spent on the discharge of patient: 30 minutes         Kassi Joseph MD  Department of Hospital Medicine  [facility] - Cardiology Stepdown

## 2024-06-18 NOTE — PROGRESS NOTES
Chief Complaint: Follow-up      Haroldo Dickinson  is a 48 y.o. year old patient who presents today for follow up     Patient's left arm still hurts at night intermittently. Relieved with nitroglycerin. Has taken it about 3 times last week. Has not tried other meds for it. Lasts for a few mins.  Recently adm with chest pain and acute hypoxic resp failure 2/2 fluid overload. Reports no issues with breathing or chest pain now.    Was unable to get HD due to low BP, was told to hold meds. Did not take for one day.   Went for 1 PT session, scheduled for two more.     Past Medical History:   Diagnosis Date    CAD (coronary artery disease), native coronary artery 11/21/2019    Cataract     Diabetes mellitus     Diabetic retinopathy     Dialysis patient     DM type 2 causing renal disease, not at goal     ESRD (end stage renal disease) started dialysis 01/2014 06/05/2014    History of colon polyps 02/10/2021    History of COVID-19 12/29/2022    Hyperparathyroidism, secondary renal 06/05/2014    Hypertension     NSTEMI (non-ST elevated myocardial infarction) 12/21/2013    Organ transplant candidate 06/05/2014    Pleural effusion 06/07/2024    Pneumonia     Post PTCA 08/26/2020    RCA [facility]ARY 7-25-20    Renal cell carcinoma of right kidney     Renal hypertension     Stroke        Past Surgical History:   Procedure Laterality Date    ANGIOGRAM, CORONARY, WITH LEFT HEART CATHETERIZATION N/A 7/1/2021    Procedure: Angiogram, Coronary, with Left Heart Cath;  Surgeon: Miki Zendejas MD;  Location: [facility] CATH LAB;  Service: Cardiology;  Laterality: N/A;    ANGIOGRAM, CORONARY, WITH LEFT HEART CATHETERIZATION N/A 11/28/2023    Procedure: Angiogram, Coronary, with Left Heart Cath;  Surgeon: Noah Pendleton MD;  Location: [facility] CATH LAB;  Service: Cardiology;  Laterality: N/A;    COLONOSCOPY N/A 5/3/2017    Procedure: COLONOSCOPY;  Surgeon: Rufino Carpenter MD;  Location: [facility] ENDO ([address] [place] [address]);  Service: Endoscopy;  Laterality: N/A;  pt  \[facility] [place] [facility]\"" can only schedule on Wednesdays    COLONOSCOPY N/A 2/9/2021    Procedure: COLONOSCOPY;  Surgeon: Edil Wong MD;  Location: [facility] ENDO (4TH FLR);  Service: Endoscopy;  Laterality: N/A;  Dialysis MWF/ labwork day of procedure  right arm aceess  per Dr. Colin pt can hold Plavix 5 days prior see note- sm  COVID test on 2/6/21 at [facility] - sm    COLONOSCOPY N/A 2/10/2021    Procedure: COLONOSCOPY;  Surgeon: Robert Ayers MD;  Location: [facility] ENDO (4TH FLR);  Service: Endoscopy;  Laterality: N/A;  rescheduled due to poor bowel prep-BB  negative covid screening 2/6/21-BB  dialysis M-W-F-BB  okay to r/s for 2/9/21 and to hold Plavix per Dr. KIRAN Wong-BB  labs same day-BB    CORONARY STENT PLACEMENT N/A 7/25/2019    Procedure: INSERTION, STENT, CORONARY ARTERY;  Surgeon: Miki Zendejas MD;  Location: [facility] CATH LAB;  Service: Cardiology;  Laterality: N/A;    DIALYSIS FISTULA CREATION      FISTULOGRAM N/A 2/18/2019    Procedure: Fistulogram;  Surgeon: Yaneth De La Cruz MD;  Location: [facility] CATH LAB;  Service: Cardiology;  Laterality: N/A;    FISTULOGRAM Right 7/23/2019    Procedure: Fistulogram;  Surgeon: Oz Cordoba MD;  Location: [facility] CATH LAB;  Service: Cardiology;  Laterality: Right;    LAPAROSCOPIC ROBOT-ASSISTED SURGICAL REMOVAL OF KIDNEY USING DA JAIME XI Right 5/19/2022    Procedure: XI ROBOTIC NEPHRECTOMY;  Surgeon: Aidan Hendricks MD;  Location: [address];  Service: Urology;  Laterality: Right;  3hrs    LAPAROSCOPIC ROBOT-ASSISTED SURGICAL REMOVAL OF KIDNEY USING DA JAIME XI Left 1/5/2023    Procedure: XI ROBOTIC NEPHRECTOMY;  Surgeon: Aidan Hendricks MD;  Location: [address];  Service: Urology;  Laterality: Left;  4 hrs    LEFT HEART CATHETERIZATION Left 7/25/2019    Procedure: Left heart cath;  Surgeon: Miki Zendejas MD;  Location: [facility] CATH LAB;  Service: Cardiology;  Laterality: Left;    REPAIR  1/5/2023    Procedure: REPAIR; COLOTOMY;  Surgeon: Maikel Lamb MD;   Location: [facility] OR [address] [place] [address];  Service: General;;    RETINAL LASER PROCEDURE Bilateral 2018 or 2017    Dr. Rothman    VASCULAR SURGERY          Family History   Problem Relation Name Age of Onset    Hypertension Mother      Diabetes Mother      Cataracts Mother      Hypertension Father      Hypertension Sister      Kidney disease Brother      Hypertension Brother      Heart disease Brother      Cancer Maternal Grandfather          Colon CA    Colon cancer Neg Hx      Esophageal cancer Neg Hx      Stomach cancer Neg Hx      Rectal cancer Neg Hx      Ulcerative colitis Neg Hx      Irritable bowel syndrome Neg Hx      Crohn's disease Neg Hx      Celiac disease Neg Hx      Glaucoma Neg Hx      Macular degeneration Neg Hx          Social History     Socioeconomic History    Marital status: Single   Occupational History     Employer: [employer]   Tobacco Use    Smoking status: Never    Smokeless tobacco: Never   Substance and Sexual Activity    Alcohol use: Not Currently     Comment: One drink a month    Drug use: No    Sexual activity: Yes     Partners: Female     Birth control/protection: None   Social History Narrative    Disabled    Was     One son          Social Determinants of Health     Financial Resource Strain: Low Risk  (6/8/2024)    Overall Financial Resource Strain (CARDIA)     Difficulty of Paying Living Expenses: Not very hard   Food Insecurity: No Food Insecurity (6/8/2024)    Hunger Vital Sign     Worried About Running Out of Food in the Last Year: Never true     Ran Out of Food in the Last Year: Never true   Transportation Needs: No Transportation Needs (6/8/2024)    TRANSPORTATION NEEDS     Transportation : No   Physical Activity: Inactive (6/8/2024)    Exercise Vital Sign     Days of Exercise per Week: 0 days     Minutes of Exercise per Session: 0 min   Stress: No Stress Concern Present (6/8/2024)    [employer] [place] [employer] - Occupational Stress Questionnaire     Feeling of  Stress : Not at all   Housing Stability: Low Risk  (6/8/2024)    Housing Stability Vital Sign     Unable to Pay for Housing in the Last Year: No     Homeless in the Last Year: No         Current Outpatient Medications:     aspirin (ECOTRIN) 81 MG EC tablet, Take 1 tablet (81 mg total) by mouth once daily., Disp: 30 tablet, Rfl: 11    atorvastatin (LIPITOR) 80 MG tablet, Take 1 tablet (80 mg total) by mouth once daily., Disp: 90 tablet, Rfl: 3    carvediloL (COREG) 25 MG tablet, Take 2 tablets (50 mg total) by mouth 2 (two) times daily with meals., Disp: 360 tablet, Rfl: 3    cinacalcet (SENSIPAR) 30 MG Tab, Take by mouth., Disp: , Rfl:     clopidogreL (PLAVIX) 75 mg tablet, Take 1 tablet (75 mg total) by mouth once daily., Disp: 90 tablet, Rfl: 3    epoetin philip (PROCRIT) 4,000 unit/mL injection, Inject 1.93 mLs (7,720 Units total) into the skin every Mon, Wed, Fri., Disp: , Rfl:     ezetimibe (ZETIA) 10 mg tablet, Take 1 tablet (10 mg total) by mouth once daily., Disp: 90 tablet, Rfl: 3    melatonin (MELATIN) 3 mg tablet, Take 1 tablet (3 mg total) by mouth nightly., Disp: 30 tablet, Rfl: 2    NIFEdipine (PROCARDIA-XL) 90 MG (OSM) 24 hr tablet, Take 1 tablet (90 mg total) by mouth once daily., Disp: 90 tablet, Rfl: 0    nitroGLYCERIN (NITROSTAT) 0.4 MG SL tablet, Place 1 tablet (0.4 mg total) under the tongue every 5 (five) minutes as needed for Chest pain., Disp: 100 tablet, Rfl: 0    polyethylene glycol (GLYCOLAX) 17 gram/dose powder, Take 17 g by mouth daily as needed for Constipation., Disp: 238 g, Rfl: 0    sevelamer carbonate (RENVELA) 800 mg Tab, Take 2 tablets (1,600 mg total) by mouth 3 (three) times daily with meals., Disp: 180 tablet, Rfl: 11    VELPHORO 500 mg Chew, Take 2 tablets by mouth., Disp: , Rfl:     vit B complex-C-folic ac-zinc (RENAPLEX) 800 mcg- 12.5 mg Tab, Take 1 tablet by mouth., Disp: , Rfl:     acetaminophen (TYLENOL) 650 MG TbSR, Take 1 tablet (650 mg total) by mouth daily as needed  (pain)., Disp: 90 tablet, Rfl: 0    hydrALAZINE (APRESOLINE) 100 MG tablet, Take 1 tablet (100 mg total) by mouth every 12 (twelve) hours., Disp: 60 tablet, Rfl: 2    isosorbide mononitrate (IMDUR) 120 MG 24 hr tablet, Take 1 tablet (120 mg total) by mouth once daily., Disp: 90 tablet, Rfl: 0    minoxidiL (LONITEN) 2.5 MG tablet, Take 2 tablets (5 mg total) by mouth 2 (two) times daily., Disp: 120 tablet, Rfl: 11  No current facility-administered medications for this visit.    Facility-Administered Medications Ordered in Other Visits:     lidocaine (PF) 10 mg/ml (1%) injection 10 mg, 1 mL, Intradermal, Once, Michelle Spence MD     Review of Systems   Respiratory:  Negative for shortness of breath.    Cardiovascular:  Negative for chest pain.   Musculoskeletal:  Positive for joint pain.   Psychiatric/Behavioral:  The patient has insomnia.         Objective:      Vitals:    06/18/24 0750   BP: 104/62   Pulse: 79   Temp: 98.1 °F (36.7 °C)       Physical Exam  Constitutional:       Appearance: Normal appearance.   HENT:      Head: Normocephalic and atraumatic.   Cardiovascular:      Rate and Rhythm: Normal rate.   Musculoskeletal:         General: No tenderness. Normal range of motion.      Left shoulder: Normal. No tenderness or bony tenderness. Normal range of motion.      Left upper arm: Normal. No swelling, tenderness or bony tenderness.      Left elbow: Normal. Normal range of motion. No tenderness.   Skin:     General: Skin is warm and dry.   Neurological:      General: No focal deficit present.      Mental Status: He is alert and oriented to person, place, and time.          Assessment:       1. Essential hypertension    2. Psychophysiological insomnia    3. Left arm pain    4. Hemiparesis affecting right side as late effect of cerebrovascular accident          Plan:   1. Essential hypertension  Assessment & Plan:  Chronic, BP on the lower side. BP: 104/62 (6/18/2024  7:50 AM)    - reduce hydralazine to  100mg BID   - advised can reduce to 50mg BID if still low   - Cont coreg, nifedipine, imdur   - counseled pt to adjust meds if taking NTG      Orders:  -     hydrALAZINE (APRESOLINE) 100 MG tablet; Take 1 tablet (100 mg total) by mouth every 12 (twelve) hours.  Dispense: 60 tablet; Refill: 2    2. Psychophysiological insomnia  Assessment & Plan:  - counseled pt on sleep hygiene techniques  - if ineffective, start melatonin nightly     Orders:  -     melatonin (MELATIN) 3 mg tablet; Take 1 tablet (3 mg total) by mouth nightly.  Dispense: 30 tablet; Refill: 2    3. Left arm pain  Assessment & Plan:  Possible claudication? Refer to HPI  Was unable to schedule US Arterial, advised patient to reach out to cardiologist about this pain during his upcoming visit  - Tylenol PRN, may start gabapentin if ineffective. Dose adj for ESRD    Orders:  -     VAS US Arterial Arm Left; Future  -     acetaminophen (TYLENOL) 650 MG TbSR; Take 1 tablet (650 mg total) by mouth daily as needed (pain).  Dispense: 90 tablet; Refill: 0    4. Hemiparesis affecting right side as late effect of cerebrovascular accident  Assessment & Plan:  Chronic, still reports mild weakness which has led to ambulatory dysfunction  Went for 1 PT session, scheduled for two more.            Follow up in about 3 months (around 9/18/2024) for f/up.

## 2024-06-18 NOTE — ASSESSMENT & PLAN NOTE
Chronic, BP on the lower side. BP: 104/62 (6/18/2024  7:50 AM)    - reduce hydralazine to 100mg BID   - advised can reduce to 50mg BID if still low   - Cont coreg, nifedipine, imdur   - counseled pt to adjust meds if taking NTG

## 2024-06-18 NOTE — ASSESSMENT & PLAN NOTE
Possible claudication? Refer to HPI  Was unable to schedule US Arterial, advised patient to reach out to cardiologist about this pain during his upcoming visit  - Tylenol PRN, may start gabapentin if ineffective. Dose adj for ESRD

## 2024-06-25 NOTE — PLAN OF CARE
"[facility]  Physical Therapy Neurological Rehabilitation Initial Evaluation     Name: Haroldo Dickinson  Clinic Number: [id number]    Therapy Diagnosis:   Encounter Diagnoses   Name Primary?    Hemiparesis affecting right side as late effect of cerebrovascular accident     Ambulatory dysfunction      Physician: Mireya Larose MD    Physician Orders: PT Eval and Treat   Medical Diagnosis from Referral: I69.351 (ICD-10-CM) - Hemiparesis affecting right side as late effect of cerebrovascular accident R26.2 (ICD-10-CM) - Ambulatory dysfunction   Evaluation Date: 5/14/2024  Authorization Period Expiration: 4/4/2025  Plan of Care Expiration: Evaluation Only  Visit # / Visits authorized: 1/ 1    Time In: 3:30  Time Out: 4:15  Total Billable Time: 45 minutes    Subjective      Date of onset: 2013    History of current condition - Haroldo reports CVA in 2013, and having inpatient rehab and outpatient therapy following. He then didn't have OP therapy again until 2021 which was limited by insurance per patient. He reports he has new insurance now; and this is why he now presents to therapy. Patient reports being in a wheelchair immediately after CVA and progressing to rollator with first round of therapy. Since then, he has mostly used rollator when out in community; but he uses SPC inside his home. Patient reports, "I'll sometimes use the cane when I go places; but my legs, especially the right one, will get tired. Patient reports he is at same status as his last round of therapy, and is returning to work more to get better.   Patient also had MI 2 months ago.     Prior Therapy: Described above   Social History: lives alone  Falls: No falls or near-falls    DME: Straight cane and Rollator    Home Environment: 1st floor apartment   Family Present at time of Eval: None present    Prior Level of Function: Ind. before CVA in 2013  Current Level of Function: Mod I since 2021    Pain:  Current 0/10    Patient's " "goals: "I wanna get rid of this walker, and start walking better and get stronger."     Medical History:   Past Medical History:   Diagnosis Date    CAD (coronary artery disease), native coronary artery 11/21/2019    Cataract     Diabetes mellitus     Diabetic retinopathy     Dialysis patient     DM type 2 causing renal disease, not at goal     ESRD (end stage renal disease) started dialysis 01/2014 06/05/2014    History of colon polyps 02/10/2021    History of COVID-19 12/29/2022    Hyperparathyroidism, secondary renal 06/05/2014    Hypertension     NSTEMI (non-ST elevated myocardial infarction) 12/21/2013    Organ transplant candidate 06/05/2014    Pleural effusion 06/07/2024    Pneumonia     Post PTCA 08/26/2020    RCA [facility] 7-25-20    Renal cell carcinoma of right kidney     Renal hypertension     Stroke        Surgical History:   Haroldo Dickinson  has a past surgical history that includes Dialysis fistula creation; Vascular surgery; Colonoscopy (N/A, 5/3/2017); Fistulogram (N/A, 2/18/2019); Fistulogram (Right, 7/23/2019); Left heart catheterization (Left, 7/25/2019); Coronary stent placement (N/A, 7/25/2019); Colonoscopy (N/A, 2/9/2021); Colonoscopy (N/A, 2/10/2021); ANGIOGRAM, CORONARY, WITH LEFT HEART CATHETERIZATION (N/A, 7/1/2021); Retinal laser procedure (Bilateral, 2018 or 2017); Laparoscopic robot-assisted surgical removal of kidney using da Naveen Xi (Right, 5/19/2022); Laparoscopic robot-assisted surgical removal of kidney using da Naveen Xi (Left, 1/5/2023); repair (1/5/2023); and ANGIOGRAM, CORONARY, WITH LEFT HEART CATHETERIZATION (N/A, 11/28/2023).    Medications:   Haroldo has a current medication list which includes the following prescription(s): acetaminophen, aspirin, atorvastatin, carvedilol, cinacalcet, clopidogrel, epoetin philip, ezetimibe, hydralazine, isosorbide mononitrate, melatonin, minoxidil, nifedipine, nitroglycerin, polyethylene glycol, sevelamer carbonate, velphoro, and renaplex, and the " "following Facility-Administered Medications: lidocaine (pf) 10 mg/ml (1%).    Allergies:   Review of patient's allergies indicates:   Allergen Reactions    No known allergies         Objective      Clonus of left and right plantar-flexors    Modified Tita Scale otherwise WNL    Left quad tone: Fair        MMT     Left        Right   Hip flexion 3/5 3-/5   Knee extension  5/5 5/5   Knee flexion      Ankle dorsiflexion 4+/5 4+/5   Ankle plantarflexion     Hip abduction         Romberg: Negative    Gait with rollator: R > L knee-extension thrust, scissoring; Mod I    Gait with SPC (in R UE): increased R knee-extension thrust, asymmetric gait with decreased R stance; Mod I    Gait without AD: impairments above + reduced toe-off/ heel-strike; CGA    Timed Up and Go: 28 seconds with SPC     Treatment     Evaluation Only      Assessment     Mr. Dickinson is a 48 y.o. male referred to outpatient Physical Therapy with a medical diagnosis of  I69.351 (ICD-10-CM) - Hemiparesis affecting right side as late effect of cerebrovascular accident R26.2 (ICD-10-CM) - Ambulatory dysfunction . Patient presents with subjective report stated above, and with both subjective and objective assessments demonstrating need and appropriateness of skilled physical therapy. However, after evaluation day, patient reported that he has "too much going on right now" to make it to therapy appointments. He agreed to following up in the future with new referral, when he has ability to participate.         Plan     Plan of care Certification: 5/14/2024 to 5/14/2024   Evaluation Only      Cindy Hansen, PT, DPT         Physician's Signature: _________________________________________ Date: ________________    "

## 2024-06-27 ENCOUNTER — OFFICE VISIT (OUTPATIENT)
Dept: CARDIOLOGY | Facility: CLINIC | Age: 49
End: 2024-06-27
Payer: MEDICARE

## 2024-06-27 VITALS
DIASTOLIC BLOOD PRESSURE: 87 MMHG | BODY MASS INDEX: 24.79 KG/M2 | HEART RATE: 58 BPM | WEIGHT: 158.31 LBS | SYSTOLIC BLOOD PRESSURE: 156 MMHG

## 2024-06-27 DIAGNOSIS — I24.9 ACUTE CORONARY SYNDROME WITH HIGH TROPONIN: Primary | ICD-10-CM

## 2024-06-27 DIAGNOSIS — E78.2 MIXED HYPERLIPIDEMIA: ICD-10-CM

## 2024-06-27 DIAGNOSIS — I10 ESSENTIAL HYPERTENSION: ICD-10-CM

## 2024-06-27 DIAGNOSIS — I25.118 CORONARY ARTERY DISEASE OF NATIVE ARTERY OF NATIVE HEART WITH STABLE ANGINA PECTORIS: ICD-10-CM

## 2024-06-27 PROCEDURE — 3077F SYST BP >= 140 MM HG: CPT | Mod: CPTII,S$GLB,, | Performed by: INTERNAL MEDICINE

## 2024-06-27 PROCEDURE — 99214 OFFICE O/P EST MOD 30 MIN: CPT | Mod: S$GLB,,, | Performed by: INTERNAL MEDICINE

## 2024-06-27 PROCEDURE — 99999 PR PBB SHADOW E&M-EST. PATIENT-LVL III: CPT | Mod: PBBFAC,,, | Performed by: INTERNAL MEDICINE

## 2024-06-27 PROCEDURE — 3079F DIAST BP 80-89 MM HG: CPT | Mod: CPTII,S$GLB,, | Performed by: INTERNAL MEDICINE

## 2024-06-27 PROCEDURE — 3066F NEPHROPATHY DOC TX: CPT | Mod: CPTII,S$GLB,, | Performed by: INTERNAL MEDICINE

## 2024-06-27 PROCEDURE — 1111F DSCHRG MED/CURRENT MED MERGE: CPT | Mod: CPTII,S$GLB,, | Performed by: INTERNAL MEDICINE

## 2024-06-27 PROCEDURE — 1159F MED LIST DOCD IN RCRD: CPT | Mod: CPTII,S$GLB,, | Performed by: INTERNAL MEDICINE

## 2024-06-27 PROCEDURE — 3008F BODY MASS INDEX DOCD: CPT | Mod: CPTII,S$GLB,, | Performed by: INTERNAL MEDICINE

## 2024-06-27 PROCEDURE — 3044F HG A1C LEVEL LT 7.0%: CPT | Mod: CPTII,S$GLB,, | Performed by: INTERNAL MEDICINE

## 2024-06-27 RX ORDER — EZETIMIBE 10 MG/1
10 TABLET ORAL DAILY
Qty: 90 TABLET | Refills: 3 | Status: SHIPPED | OUTPATIENT
Start: 2024-06-27 | End: 2025-06-27

## 2024-06-27 NOTE — PROGRESS NOTES
Subjective:   06/27/2024     Patient ID:  Haroldo Dickinson is a 48 y.o. male who presents for evaulation of Carotid Artery Disease      Patient has had several recent hospitalizations for non ST elevation MI, felt to be type 2, improved chest pain with removal of fluid on dialysis.  His blood pressure medicines are currently being adjusted by Nephrology.  Will continue to have them do so.      Hyperlipidemia is treated with high-dose statin therapy plus ezetimibe, most recent LDL cholesterol 34.      Previous cardiac catheterization showed severe distal disease, not felt to be a candidate for coronary interventions.        Prior notes reviewed:  Hospital records reviewed, recently hospitalized with chest pain, underwent cardiac catheterization.  He was found have a severely diseased distal LAD, but the proximal LAD had only mild disease.  The proximal circumflex also was free of disease, he would an occluded distal circumflex.  The right coronary artery is occluded, but appeared to be fairly small-vessel.  He is done well with alterations medical therapy with no exertional chest pains or tightness.      Blood pressure is well controlled.    Laboratory from May of 2023 showed an LDL cholesterol 74, should be less than 70 mg/dL, now on a atorvastatin 80 mg nightly, will add ezetimibe 10 mg daily.      Patient with chronic kidney failure, continues on dialysis.            Recent hospital note reviewed:     [facility] - Observation [facility]  Hospital Medicine  Discharge Summary        Patient Name: Haroldo Dickinson  MRN: [id number]  SADIE: [id number]  Patient Class: IP- Inpatient  Admission Date: 11/27/2023  Hospital Length of Stay: 1 days  Discharge Date and Time:  11/29/2023 6:34 PM  Attending Physician: Apple Arredondo MD   Discharging Provider: Mathieu Esteban PA-C  Primary Care Provider: Elizabeth Primary Doctor  [facility] Medicine Team: [facility] Mathieu Esteban PA-C  Primary Care Team: [facility]     HPI:   Patient is a  48-year-old male with past medical history significant for CAD with previous PCI x2, CVA with residual right-sided weakness, RCC status post nephrectomy, ESRD Monday Wednesday Friday, hypertensive kidney disease who is presenting with a 5 day of intermittent chest pain.  Patient describes the chest pain as waxing and waning with a burning quality.  He rates it as a 7/10 at its worst.  He states that it is worse at night and seems to happen more often at that time.  It is not associated with exertion or emotional stress.  The patient of note does not take any aspirin or clopidogrel as he states that a physician here told him not to despite having PCI.  Patient states that he last got dialysis on Friday and feels that he is slightly fluid overloaded.  He had an episode of chest pain today that was associated with shortness of breath lasting 5 hours which prompted him to call an ambulance.  He denies any dizziness or diaphoresis associated with these episodes and states that after calling EMS, he felt better immediately.  In route to the emergency department, he received 1 dose of nitro with relief of his chest discomfort.  In the emergency room patient was hypertensive, non tachycardic on room air without discomfort.  Demonstrating no evidence of fluid overload on chest x-ray.  BNP was 179.  Troponins were slightly elevated but decreased compared to baseline at 0.096.  Potassium was markedly elevated at 5.7 with evidence of peaked T-waves on EKG and he received calcium, insulin/glucose and Lokelma.  He does not make any urine due to his bilateral nephrectomy.  He received 3 space of nitro and 325 of aspirin.             Procedure(s) (LRB):  Angiogram, Coronary, with Left Heart Cath (N/A)       Hospital Course:   Haroldo Dickinson is placed in  Observation for management of chest pain and subsequent NSTEMI. Chest pain resolving on admission. EKG and CXR reviewed. Hyperkalemic on admission, shifted, with repeat K within  normal limits. Troponin trending. Continuing ACS for now. Cardiology following. Nephro follow for HD needs. Dialysis 11/28 during admission. Unable to schedule PET today, will proceed with LHC. C with known RCA  and distal LAD lesions, Interventional Cardiology recommending medical therapy. Heparin gtt continued to complex 48 hours of medical treatment. HD 11/29.      Patient will continue ASA, plavix, and statin at discharge. He will stop amlodipine and start nifedipine, he will increase hydralazine to 50mg q8h,and increase coreg to 50mg bid. Medications sent to bedside delivery. He will follow up with Internal Medicine and Cardiology. He will resume his scheduled HD appointments. Return precautions provided. Patient medically ready for discharge. Plan of care discussed with patient, patient agreeable to plan, and all questions answered.        Goals of Care Treatment Preferences:  Code Status: Full Code        Consults:     Consults (From admission, onward)             Status Ordering Provider        Inpatient consult to Interventional Cardiology  Once       Provider:  (Not yet assigned)   Completed KEILA NORIEGA        Inpatient consult to Cardiology  Once       Provider:  (Not yet assigned)   Completed LG JOHNSON        Inpatient consult to Nephrology  Once       Provider:  (Not yet assigned)   Completed LG JOHNSON                No new Assessment & Plan notes have been filed under this hospital service since the last note was generated.  Service: Hospital Medicine       Final Active Diagnoses:    Diagnosis Date Noted POA  · PRINCIPAL PROBLEM:  NSTEMI (non-ST elevated myocardial infarction) [I21.4] 11/27/2023 Yes  · Essential hypertension [I10] 11/21/2019 Yes  · Hyperkalemia [E87.5] 11/30/2022 Yes  · Hypertensive emergency [I16.1] 11/27/2023 Yes  · Anemia in ESRD (end-stage renal disease) [N18.6, D63.1] 06/14/2022 Yes  · Chronic kidney disease-mineral and bone disorder [N18.9, E83.9,  M89.9] 06/14/2022 Yes  · CAD (coronary artery disease), native coronary artery [I25.10] 11/21/2019 Yes  · End stage renal failure on dialysis [N18.6, Z99.2] 06/05/2014 Not Applicable  · Controlled type 2 diabetes mellitus with CKD [E11.22]   Yes     Problems Resolved During this Admission:        Discharged Condition: stable     Disposition: Home or Self Care     Follow Up:     Patient Instructions:         ACCEPT - Ambulatory referral/consult to [facility] Failure [facility]   Standing Status: Future  Referral Priority: Routine Referral Type: Consultation   Referral Reason: Specialty Services Required   Requested Specialty: Cardiology   Number of Visits Requested: 1        Ambulatory referral/consult to Internal Medicine   Standing Status: Future  Referral Priority: Urgent Referral Type: Consultation   Referral Reason: Specialty Services Required   Requested Specialty: Internal Medicine   Number of Visits Requested: 1        Ambulatory referral/consult to Cardiology   Standing Status: Future  Referral Priority: Urgent Referral Type: Consultation   Referral Reason: Specialty Services Required   Requested Specialty: Cardiology   Number of Visits Requested: 1        Diet renal       Notify your health care provider if you experience any of the following:  severe uncontrolled pain       Notify your health care provider if you experience any of the following:  difficulty breathing or increased cough       Notify your health care provider if you experience any of the following:  increased confusion or weakness       Notify your health care provider if you experience any of the following:  persistent dizziness, light-headedness, or visual disturbances       Activity as tolerated        Significant Diagnostic Studies: N/A       Pending Diagnostic Studies:          None             Medications:  Reconciled Home Medications:        Medication List          START taking these medications        atorvastatin 80 MG  tablet  Commonly known as: LIPITOR  Take 1 tablet (80 mg total) by mouth once daily.  Start taking on: November 30, 2023     clopidogreL 75 mg tablet  Commonly known as: PLAVIX  Take 1 tablet (75 mg total) by mouth once daily.  Start taking on: November 30, 2023     NIFEdipine 60 MG (OSM) 24 hr tablet  Commonly known as: PROCARDIA-XL  Take 1 tablet (60 mg total) by mouth once daily.  Start taking on: November 30, 2023                  CHANGE how you take these medications        carvediloL 25 MG tablet  Commonly known as: COREG  Take 2 tablets (50 mg total) by mouth 2 (two) times daily with meals.  What changed: how much to take     hydrALAZINE 50 MG tablet  Commonly known as: APRESOLINE  Take 1 tablet (50 mg total) by mouth every 8 (eight) hours. for SBP > 140mm HG  What changed:   · medication strength  · how much to take                  CONTINUE taking these medications        aspirin 81 MG EC tablet  Commonly known as: ECOTRIN  Take 1 tablet (81 mg total) by mouth once daily.     ciclopirox 8 % Soln  Commonly known as: PENLAC  Apply topically nightly.     cinacalcet 90 MG Tab  Commonly known as: SENSIPAR  Take 1 tablet by mouth once daily.     polyethylene glycol 17 gram/dose powder  Commonly known as: GLYCOLAX  Use cap to measure 17 g, then mix in liquid and drink by mouth once daily.     sevelamer carbonate 800 mg Tab  Commonly known as: RENVELA  Take 800 mg by mouth 3 (three) times daily with meals.                  STOP taking these medications        amLODIPine 10 MG tablet  Commonly known as: NORVASC                   Indwelling Lines/Drains at time of discharge:     Lines/Drains/Airways            Drain  Duration                     Hemodialysis AV Fistula Right forearm -- days                          Time spent on the discharge of patient: 33 minutes          Past Medical History:   Diagnosis Date    CAD (coronary artery disease), native coronary artery 11/21/2019    Cataract     Diabetes mellitus      Diabetic retinopathy     Dialysis patient     DM type 2 causing renal disease, not at goal     ESRD (end stage renal disease) started dialysis 01/2014 06/05/2014    History of colon polyps 02/10/2021    History of COVID-19 12/29/2022    Hyperparathyroidism, secondary renal 06/05/2014    Hypertension     NSTEMI (non-ST elevated myocardial infarction) 12/21/2013    Organ transplant candidate 06/05/2014    Pleural effusion 06/07/2024    Pneumonia     Post PTCA 08/26/2020    RCA [facility] 7-25-20    Renal cell carcinoma of right kidney     Renal hypertension     Stroke        Review of patient's allergies indicates:   Allergen Reactions    No known allergies          Current Outpatient Medications:     acetaminophen (TYLENOL) 650 MG TbSR, Take 1 tablet (650 mg total) by mouth daily as needed (pain)., Disp: 90 tablet, Rfl: 0    aspirin (ECOTRIN) 81 MG EC tablet, Take 1 tablet (81 mg total) by mouth once daily., Disp: 30 tablet, Rfl: 11    atorvastatin (LIPITOR) 80 MG tablet, Take 1 tablet (80 mg total) by mouth once daily., Disp: 90 tablet, Rfl: 3    carvediloL (COREG) 25 MG tablet, Take 2 tablets (50 mg total) by mouth 2 (two) times daily with meals., Disp: 360 tablet, Rfl: 3    cinacalcet (SENSIPAR) 30 MG Tab, Take by mouth., Disp: , Rfl:     clopidogreL (PLAVIX) 75 mg tablet, Take 1 tablet (75 mg total) by mouth once daily., Disp: 90 tablet, Rfl: 3    epoetin philip (PROCRIT) 4,000 unit/mL injection, Inject 1.93 mLs (7,720 Units total) into the skin every Mon, Wed, Fri., Disp: , Rfl:     hydrALAZINE (APRESOLINE) 100 MG tablet, Take 1 tablet (100 mg total) by mouth every 12 (twelve) hours., Disp: 60 tablet, Rfl: 2    isosorbide mononitrate (IMDUR) 120 MG 24 hr tablet, Take 1 tablet (120 mg total) by mouth once daily., Disp: 90 tablet, Rfl: 0    melatonin (MELATIN) 3 mg tablet, Take 1 tablet (3 mg total) by mouth nightly., Disp: 30 tablet, Rfl: 2    minoxidiL (LONITEN) 2.5 MG tablet, Take 2 tablets (5 mg total) by mouth 2 (two)  times daily., Disp: 120 tablet, Rfl: 11    NIFEdipine (PROCARDIA-XL) 90 MG (OSM) 24 hr tablet, Take 1 tablet (90 mg total) by mouth once daily., Disp: 90 tablet, Rfl: 0    nitroGLYCERIN (NITROSTAT) 0.4 MG SL tablet, Place 1 tablet (0.4 mg total) under the tongue every 5 (five) minutes as needed for Chest pain., Disp: 100 tablet, Rfl: 0    polyethylene glycol (GLYCOLAX) 17 gram/dose powder, Take 17 g by mouth daily as needed for Constipation., Disp: 238 g, Rfl: 0    sevelamer carbonate (RENVELA) 800 mg Tab, Take 2 tablets (1,600 mg total) by mouth 3 (three) times daily with meals., Disp: 180 tablet, Rfl: 11    VELPHORO 500 mg Chew, Take 2 tablets by mouth., Disp: , Rfl:     vit B complex-C-folic ac-zinc (RENAPLEX) 800 mcg- 12.5 mg Tab, Take 1 tablet by mouth., Disp: , Rfl:     ezetimibe (ZETIA) 10 mg tablet, Take 1 tablet (10 mg total) by mouth once daily., Disp: 90 tablet, Rfl: 3  No current facility-administered medications for this visit.    Facility-Administered Medications Ordered in Other Visits:     lidocaine (PF) 10 mg/ml (1%) injection 10 mg, 1 mL, Intradermal, Once, Michelle Spence MD     Objective:   Review of Systems   Cardiovascular:  Positive for chest pain and dyspnea on exertion. Negative for claudication, cyanosis, irregular heartbeat, leg swelling, near-syncope, orthopnea, palpitations, paroxysmal nocturnal dyspnea and syncope.         Vitals:    06/27/24 1122   BP: (!) 156/87   Pulse: (!) 58     Wt Readings from Last 3 Encounters:   06/27/24 71.8 kg (158 lb 4.6 oz)   06/18/24 74.9 kg (165 lb 2 oz)   06/12/24 71 kg (156 lb 8.4 oz)     Temp Readings from Last 3 Encounters:   06/18/24 98.1 °F (36.7 °C) (Oral)   06/12/24 97.8 °F (36.6 °C) (Oral)   04/04/24 97.8 °F (36.6 °C) (Oral)     BP Readings from Last 3 Encounters:   06/27/24 (!) 156/87   06/18/24 104/62   06/12/24 (!) 143/87     Pulse Readings from Last 3 Encounters:   06/27/24 (!) 58   06/18/24 79   06/12/24 68             Physical  Exam  Vitals reviewed.   Constitutional:       General: He is not in acute distress.     Appearance: He is well-developed.   HENT:      Head: Normocephalic and atraumatic.      Nose: Nose normal.   Eyes:      Conjunctiva/sclera: Conjunctivae normal.      Pupils: Pupils are equal, round, and reactive to light.   Neck:      Vascular: No carotid bruit or JVD.   Cardiovascular:      Rate and Rhythm: Normal rate and regular rhythm.      Pulses: Intact distal pulses.      Heart sounds: Normal heart sounds. No murmur heard.     No friction rub. No gallop.   Pulmonary:      Effort: Pulmonary effort is normal. No respiratory distress.      Breath sounds: Normal breath sounds. No wheezing or rales.   Chest:      Chest wall: No tenderness.   Abdominal:      General: Bowel sounds are normal. There is no distension.      Palpations: Abdomen is soft.      Tenderness: There is no abdominal tenderness.   Musculoskeletal:         General: No tenderness or deformity. Normal range of motion.      Cervical back: Normal range of motion and neck supple.      Right lower leg: No edema.      Left lower leg: No edema.   Skin:     General: Skin is warm and dry.      Findings: No erythema or rash.   Neurological:      Mental Status: He is alert and oriented to person, place, and time.      Cranial Nerves: No cranial nerve deficit.      Motor: No abnormal muscle tone.      Coordination: Coordination normal.   Psychiatric:         Behavior: Behavior normal.         Thought Content: Thought content normal.         Judgment: Judgment normal.           Lab Results   Component Value Date    CHOL 96 (L) 03/11/2024    CHOL 144 05/02/2023    CHOL 111 (L) 01/02/2020     Lab Results   Component Value Date    HDL 51 03/11/2024    HDL 51 05/02/2023    HDL 56 01/02/2020     Lab Results   Component Value Date    LDLCALC 33.6 (L) 03/11/2024    LDLCALC 74.2 05/02/2023    LDLCALC 48.2 (L) 01/02/2020     Lab Results   Component Value Date    ALT 13 06/07/2024     AST 16 06/07/2024    AST 17 03/13/2024    AST 24 03/12/2024     Lab Results   Component Value Date    CREATININE 11.5 (H) 06/12/2024    BUN 17 06/12/2024     (L) 06/12/2024    K 4.2 06/12/2024    CO2 22 (L) 06/12/2024    CO2 25 06/11/2024    CO2 21 (L) 06/10/2024     Lab Results   Component Value Date    HGB 9.0 (L) 06/19/2024    HCT 26.1 (L) 06/12/2024    HCT 28.0 (L) 06/11/2024    HCT 26.4 (L) 06/10/2024                         Assessment and Plan:     Acute coronary syndrome with high troponin  Comments:  Currently improved with volume removal  Orders:  -     Ambulatory referral/consult to Cardiology    Mixed hyperlipidemia  Comments:  LDL at goal on high-dose statin plus ezetimibe  Orders:  -     ezetimibe (ZETIA) 10 mg tablet; Take 1 tablet (10 mg total) by mouth once daily.  Dispense: 90 tablet; Refill: 3    Coronary artery disease of native artery of native heart with stable angina pectoris  Comments:  Severe distal disease currently stable    Essential hypertension  Comments:  Will continue to follow-up with Nephrology while on dialysis         No follow-ups on file.          Future Appointments   Date Time Provider Department Center   7/2/2024  9:00 AM NURSE, [facility] [place]   8/1/2024  9:30 AM Aisha Olsen DPM [facility] POD [place]   8/1/2024  2:00 PM Torrie Burr OD [facility] [place]   9/18/2024 10:40 AM Mireya Larose MD [facility] [place]

## 2024-07-02 ENCOUNTER — CLINICAL SUPPORT (OUTPATIENT)
Dept: FAMILY MEDICINE | Facility: CLINIC | Age: 49
End: 2024-07-02
Payer: MEDICARE

## 2024-07-02 ENCOUNTER — TELEPHONE (OUTPATIENT)
Dept: FAMILY MEDICINE | Facility: CLINIC | Age: 49
End: 2024-07-02

## 2024-07-02 VITALS — DIASTOLIC BLOOD PRESSURE: 74 MMHG | SYSTOLIC BLOOD PRESSURE: 138 MMHG

## 2024-07-02 DIAGNOSIS — I10 ESSENTIAL HYPERTENSION: Primary | ICD-10-CM

## 2024-07-02 PROCEDURE — 99999 PR PBB SHADOW E&M-EST. PATIENT-LVL I: CPT | Mod: PBBFAC,,,

## 2024-07-02 NOTE — TELEPHONE ENCOUNTER
----- Message from Camila Rosales sent at 7/2/2024  2:59 PM CDT -----  .Type: Patient Call Back    Who called: Syeda Gallagher     What is the request in detail: Blood pressure is high. He's not on his meds and would like to know if he can start back on his meds.    Can the clinic reply by [facility]? No     Would the patient rather a call back or a response via [facility]? Call Back     Best call back number: [phone] (nurse)  [phone] (home)       Additional Information:

## 2024-07-02 NOTE — PROGRESS NOTES
Haroldo Dickinson 48 y.o. male is here today for Blood Pressure check.   History of HTN yes.    Review of patient's allergies indicates:   Allergen Reactions    No known allergies      Creatinine   Date Value Ref Range Status   06/12/2024 11.5 (H) 0.5 - 1.4 mg/dL Final     Sodium   Date Value Ref Range Status   06/12/2024 130 (L) 136 - 145 mmol/L Final     Potassium   Date Value Ref Range Status   06/12/2024 4.2 3.5 - 5.1 mmol/L Final   ]  Patient verifies taking blood pressure medications on a regular basis at the same time of the day.     Current Outpatient Medications:     isosorbide mononitrate (IMDUR) 120 MG 24 hr tablet, Take 1 tablet (120 mg total) by mouth once daily., Disp: 90 tablet, Rfl: 0    NIFEdipine (PROCARDIA-XL) 90 MG (OSM) 24 hr tablet, Take 1 tablet (90 mg total) by mouth once daily., Disp: 90 tablet, Rfl: 0    acetaminophen (TYLENOL) 650 MG TbSR, Take 1 tablet (650 mg total) by mouth daily as needed (pain)., Disp: 90 tablet, Rfl: 0    aspirin (ECOTRIN) 81 MG EC tablet, Take 1 tablet (81 mg total) by mouth once daily., Disp: 30 tablet, Rfl: 11    atorvastatin (LIPITOR) 80 MG tablet, Take 1 tablet (80 mg total) by mouth once daily., Disp: 90 tablet, Rfl: 3    carvediloL (COREG) 25 MG tablet, Take 2 tablets (50 mg total) by mouth 2 (two) times daily with meals. (Patient not taking: Reported on 7/2/2024), Disp: 360 tablet, Rfl: 3    cinacalcet (SENSIPAR) 30 MG Tab, Take by mouth., Disp: , Rfl:     clopidogreL (PLAVIX) 75 mg tablet, Take 1 tablet (75 mg total) by mouth once daily., Disp: 90 tablet, Rfl: 3    epoetin philip (PROCRIT) 4,000 unit/mL injection, Inject 1.93 mLs (7,720 Units total) into the skin every Mon, Wed, Fri., Disp: , Rfl:     ezetimibe (ZETIA) 10 mg tablet, Take 1 tablet (10 mg total) by mouth once daily., Disp: 90 tablet, Rfl: 3    hydrALAZINE (APRESOLINE) 100 MG tablet, Take 1 tablet (100 mg total) by mouth every 12 (twelve) hours. (Patient not taking: Reported on 7/2/2024), Disp: 60  tablet, Rfl: 2    melatonin (MELATIN) 3 mg tablet, Take 1 tablet (3 mg total) by mouth nightly., Disp: 30 tablet, Rfl: 2    minoxidiL (LONITEN) 2.5 MG tablet, Take 2 tablets (5 mg total) by mouth 2 (two) times daily., Disp: 120 tablet, Rfl: 11    nitroGLYCERIN (NITROSTAT) 0.4 MG SL tablet, Place 1 tablet (0.4 mg total) under the tongue every 5 (five) minutes as needed for Chest pain., Disp: 100 tablet, Rfl: 0    polyethylene glycol (GLYCOLAX) 17 gram/dose powder, Take 17 g by mouth daily as needed for Constipation., Disp: 238 g, Rfl: 0    sevelamer carbonate (RENVELA) 800 mg Tab, Take 2 tablets (1,600 mg total) by mouth 3 (three) times daily with meals., Disp: 180 tablet, Rfl: 11    VELPHORO 500 mg Chew, Take 2 tablets by mouth., Disp: , Rfl:     vit B complex-C-folic ac-zinc (RENAPLEX) 800 mcg- 12.5 mg Tab, Take 1 tablet by mouth., Disp: , Rfl:   No current facility-administered medications for this visit.    Facility-Administered Medications Ordered in Other Visits:     lidocaine (PF) 10 mg/ml (1%) injection 10 mg, 1 mL, Intradermal, Once, Michelle Spence MD  Does patient have record of home blood pressure readings no. Readings have been averaging states had been elevated last couple days due to his eating habits.   Last dose of blood pressure medication was taken at this morning.  Patient is asymptomatic.   Complains of no complaints.    Vitals:    07/02/24 0851 07/02/24 0914   BP: (!) 146/82 138/74   BP Location: Left arm Left arm   Patient Position: Sitting Sitting   BP Method: Large (Manual) Large (Manual)         Dr. Larose informed of nurse visit. Pt states nephrologist took him off of hydralazine and coreg

## 2024-07-02 NOTE — TELEPHONE ENCOUNTER
Syeda not available,  for her to return call to office; spoke to pt and he states bp is elevated 188/98 and 177/99; nurse at the house did inform him to take dose of her coreg and hydralazine

## 2024-07-10 ENCOUNTER — TELEPHONE (OUTPATIENT)
Dept: FAMILY MEDICINE | Facility: CLINIC | Age: 49
End: 2024-07-10
Payer: MEDICARE

## 2024-07-23 ENCOUNTER — OFFICE VISIT (OUTPATIENT)
Dept: FAMILY MEDICINE | Facility: CLINIC | Age: 49
End: 2024-07-23
Payer: MEDICARE

## 2024-07-23 VITALS
HEIGHT: 67 IN | OXYGEN SATURATION: 96 % | WEIGHT: 156.5 LBS | DIASTOLIC BLOOD PRESSURE: 68 MMHG | TEMPERATURE: 98 F | RESPIRATION RATE: 18 BRPM | BODY MASS INDEX: 24.56 KG/M2 | HEART RATE: 63 BPM | SYSTOLIC BLOOD PRESSURE: 90 MMHG

## 2024-07-23 DIAGNOSIS — Z99.2 TYPE 2 DIABETES MELLITUS WITH CHRONIC KIDNEY DISEASE ON CHRONIC DIALYSIS, WITHOUT LONG-TERM CURRENT USE OF INSULIN: ICD-10-CM

## 2024-07-23 DIAGNOSIS — E11.22 TYPE 2 DIABETES MELLITUS WITH CHRONIC KIDNEY DISEASE ON CHRONIC DIALYSIS, WITHOUT LONG-TERM CURRENT USE OF INSULIN: ICD-10-CM

## 2024-07-23 DIAGNOSIS — I12.9 RENAL HYPERTENSION: Primary | ICD-10-CM

## 2024-07-23 DIAGNOSIS — I10 ESSENTIAL HYPERTENSION: ICD-10-CM

## 2024-07-23 DIAGNOSIS — N18.6 TYPE 2 DIABETES MELLITUS WITH CHRONIC KIDNEY DISEASE ON CHRONIC DIALYSIS, WITHOUT LONG-TERM CURRENT USE OF INSULIN: ICD-10-CM

## 2024-07-23 DIAGNOSIS — N18.6 ESRD ON HEMODIALYSIS: ICD-10-CM

## 2024-07-23 DIAGNOSIS — Z99.2 ESRD ON HEMODIALYSIS: ICD-10-CM

## 2024-07-23 PROCEDURE — 99999 PR PBB SHADOW E&M-EST. PATIENT-LVL V: CPT | Mod: PBBFAC,,,

## 2024-07-23 RX ORDER — AMLODIPINE BESYLATE 10 MG/1
10 TABLET ORAL
COMMUNITY
Start: 2024-05-08

## 2024-07-23 RX ORDER — ISOSORBIDE MONONITRATE 60 MG/1
1 TABLET, EXTENDED RELEASE ORAL DAILY
COMMUNITY
Start: 2024-03-18

## 2024-07-23 NOTE — PROGRESS NOTES
HPI     Chief Complaint:  Chief Complaint   Patient presents with    Follow-up       Haroldo Dickinson is a 48 y.o. male with multiple medical diagnoses as listed in the medical history and problem list that presents for Follow-up    Hypertension  Pertinent negatives include no blurred vision, chest pain, headaches, palpitations, peripheral edema, shortness of breath or sweats. There are no associated agents to hypertension. Risk factors for coronary artery disease include male gender. Past treatments include nothing. Hypertensive end-organ damage includes angina and kidney disease.      Patient states he has not  eaten or drinked anything today. Did not feel like it.  Felt a little tired today.    Today he took his Coreg and Loniten states has blood pressure was over 130 it is around 140  on the top number at home; decided to take his hydralazine as directed ( was told to take his hydralazine was blood pressure (systolic) is over 130).    Takes his Imdur daily. Hasn't  taken NTG ( nitroglycerin) in a long time- both are for his angina    Dialysis was yesterday, Schedule was MWF     Restricted to 1 liter  of fluid per day.    On 7/2/24 ate pickle chips before BP was taken with nurse's visit,  blood pressure was elevated that day    Loniten and Coreg 2x a day, Hydralazine, Procardia     Takes Procardia in the evenings especially on dialysis days     On dialysis days MWF: take  hydralazine and coreg and takes a BP  medication  depending on blood pressure reading after diaysis, usually take the procardia  dialysis.  States that the doctor at the dialysis center said to take for  procardia and Imdur in the evenings.     Assessment & Plan     Instructed patient to continue with blood pressure medication methods on dialysis days and non-dialysis days.    Scheduled patient to come back in 2 weeks for nurse's visit to check his blood pressure   Discussed with patient about importance of diet and  fluid restrictions  while on  his renal diet   Inform patient that if he would had the blood pressure today 90/68, after eaten and drinking today, his blood pressure medications may have been changed.   Encouraged patient to  stay compliant with his diet and was given a blood pressure log to take his blood pressure at home and bring back with him on his nurse's visit appointment.     Information provided on AVS      Problem List Items Addressed This Visit          Cardiac/Vascular    Renal hypertension - Primary   Discussed with patient his blood pressure medication regiment.  Encouraged patient to continue with regiment but also  comply with his  renal diet      Essential hypertension  Discussed with patient his blood pressure medication regiment.  Encouraged patient to continue with regiment but also  comply with his  renal diet       Renal/    ESRD on hemodialysis   Encouraged patient to comply with his  renal diet.  Continue with dialysis Monday, Wednesday and Friday.  Patient looks to have labs every 3 months and we will be due September of 2024       Endocrine    Type 2 diabetes mellitus with chronic kidney disease on chronic dialysis, without long-term current use of insulin   Last hemoglobin A1c collected on 07/10/2024 was 4.3.   Continue with plan regiment as prescribed         --------------------------------------------      Health Maintenance:  Health Maintenance         Date Due Completion Date    TETANUS VACCINE Never done ---    COVID-19 Vaccine (3 - Moderna risk series) 12/08/2021 11/10/2021    Eye Exam 08/24/2022 8/24/2021    Influenza Vaccine (1) 09/01/2024 10/9/2023    Hemoglobin A1c 01/10/2025 7/10/2024    Lipid Panel 03/11/2025 3/11/2024    Colorectal Cancer Screening 02/10/2026 2/10/2021            Health maintenance reviewed  patient declined tetanus shot  at this time    Follow Up:  Follow up if symptoms worsen or fail to improve.    Exam     Review of Systems:  (as noted above)  Review of Systems   Constitutional:   "Negative for fever.   Eyes:  Negative for blurred vision and visual disturbance.   Respiratory:  Negative for shortness of breath.    Cardiovascular:  Negative for chest pain and palpitations.   Gastrointestinal:  Negative for abdominal pain, diarrhea, nausea and vomiting.   Genitourinary:  Negative for difficulty urinating.   Musculoskeletal:  Negative for back pain.   Neurological:  Negative for dizziness, weakness, numbness and headaches.       Physical Exam:   Physical Exam  Constitutional:       General: He is not in acute distress.     Appearance: He is not ill-appearing, toxic-appearing or diaphoretic.   HENT:      Head: Normocephalic and atraumatic.   Cardiovascular:      Rate and Rhythm: Normal rate and regular rhythm.      Pulses: Normal pulses.      Heart sounds: Murmur heard.   Pulmonary:      Effort: Pulmonary effort is normal. No respiratory distress.      Breath sounds: Normal breath sounds.   Musculoskeletal:      Right lower leg: No edema.      Left lower leg: No edema.   Neurological:      Mental Status: He is alert and oriented to person, place, and time.   Psychiatric:         Mood and Affect: Mood normal.       Vitals:    07/23/24 1318   BP: 90/68   BP Location: Right arm   Patient Position: Sitting   BP Method: Small (Manual)   Pulse: 63   Resp: 18   Temp: 98.1 °F (36.7 °C)   TempSrc: Oral   SpO2: 96%   Weight: 71 kg (156 lb 8.4 oz)   Height: 5' 7" (1.702 m)      Body mass index is 24.52 kg/m².        History     Past Medical History:  Past Medical History:   Diagnosis Date    CAD (coronary artery disease), native coronary artery 11/21/2019    Cataract     Diabetes mellitus     Diabetic retinopathy     Dialysis patient     DM type 2 causing renal disease, not at goal     ESRD (end stage renal disease) started dialysis 01/2014 06/05/2014    History of colon polyps 02/10/2021    History of COVID-19 12/29/2022    Hyperparathyroidism, secondary renal 06/05/2014    Hypertension     NSTEMI (non-ST " elevated myocardial infarction) 12/21/2013    Organ transplant candidate 06/05/2014    Pleural effusion 06/07/2024    Pneumonia     Post PTCA 08/26/2020    RCA [facility] 7-25-20    Renal cell carcinoma of right kidney     Renal hypertension     Stroke        Past Surgical History:  Past Surgical History:   Procedure Laterality Date    ANGIOGRAM, CORONARY, WITH LEFT HEART CATHETERIZATION N/A 7/1/2021    Procedure: Angiogram, Coronary, with Left Heart Cath;  Surgeon: Miki Zendejas MD;  Location: [facility] CATH LAB;  Service: Cardiology;  Laterality: N/A;    ANGIOGRAM, CORONARY, WITH LEFT HEART CATHETERIZATION N/A 11/28/2023    Procedure: Angiogram, Coronary, with Left Heart Cath;  Surgeon: Noah Pendleton MD;  Location: [facility] CATH LAB;  Service: Cardiology;  Laterality: N/A;    COLONOSCOPY N/A 5/3/2017    Procedure: COLONOSCOPY;  Surgeon: Rufino Carpenter MD;  Location: [facility] ([address] [place] [address]);  Service: Endoscopy;  Laterality: N/A;  pt states can only schedule on Wednesdays    COLONOSCOPY N/A 2/9/2021    Procedure: COLONOSCOPY;  Surgeon: Edil Wong MD;  [facility]  Service: Endoscopy;  Laterality: N/A;  Dialysis MWF/ labwork day of procedure  right arm aceess  per Dr. Colin pt can hold Plavix 5 days prior see note- sm  COVID test on 2/6/21 at [facility] - sm    COLONOSCOPY N/A 2/10/2021    Procedure: COLONOSCOPY;  Surgeon: Robert Ayers MD;  [facility]  Service: Endoscopy;  Laterality: N/A;  rescheduled due to poor bowel prep-BB  negative covid screening 2/6/21-BB  dialysis M-W-F-BB  okay to r/s for 2/9/21 and to hold Plavix per Dr. KIRAN Wong-BB  labs same day-BB    CORONARY STENT PLACEMENT N/A 7/25/2019    Procedure: INSERTION, STENT, CORONARY ARTERY;  Surgeon: Miki Zendejas MD;  Location: [facility] CATH LAB;  Service: Cardiology;  Laterality: N/A;    DIALYSIS FISTULA CREATION      FISTULOGRAM N/A 2/18/2019    Procedure: Fistulogram;  Surgeon: Yaneth De La Cruz MD;  Location: [facility] CATH  LAB;  Service: Cardiology;  Laterality: N/A;    FISTULOGRAM Right 7/23/2019    Procedure: Fistulogram;  Surgeon: Oz Cordoba MD;  Location: [facility] CATH LAB;  Service: Cardiology;  Laterality: Right;    LAPAROSCOPIC ROBOT-ASSISTED SURGICAL REMOVAL OF KIDNEY USING DA JAIME XI Right 5/19/2022    Procedure: XI ROBOTIC NEPHRECTOMY;  Surgeon: Aidan Hendricks MD;  Location: [facility] OR [facility] FLR;  Service: Urology;  Laterality: Right;  3hrs    LAPAROSCOPIC ROBOT-ASSISTED SURGICAL REMOVAL OF KIDNEY USING DA JAIME XI Left 1/5/2023    Procedure: XI ROBOTIC NEPHRECTOMY;  Surgeon: Aidan Hendricks MD;  Location: [facility] OR [facility] FLR;  Service: Urology;  Laterality: Left;  4 hrs    LEFT HEART CATHETERIZATION Left 7/25/2019    Procedure: Left heart cath;  Surgeon: Miki Zendejas MD;  Location: [facility] CATH LAB;  Service: Cardiology;  Laterality: Left;    REPAIR  1/5/2023    Procedure: REPAIR; COLOTOMY;  Surgeon: Maikel Lamb MD;  [facility];  Service: General;;    RETINAL LASER PROCEDURE Bilateral 2018 or 2017    Dr. Rothman    VASCULAR SURGERY         Social History:  Social History     Socioeconomic History    Marital status: Single   Occupational History     Employer: [employer]   Tobacco Use    Smoking status: Never    Smokeless tobacco: Never   Substance and Sexual Activity    Alcohol use: Not Currently     Comment: One drink a month    Drug use: No    Sexual activity: Yes     Partners: Female     Birth control/protection: None   Social History Narrative    Disabled    Was     One son          Social Determinants of Health     Financial Resource Strain: Low Risk  (6/8/2024)    Overall Financial Resource Strain (CARDIA)     Difficulty of Paying Living Expenses: Not very hard   Food Insecurity: No Food Insecurity (6/8/2024)    Hunger Vital Sign     Worried About Running Out of Food in the Last Year: Never true     Ran Out of Food in the Last Year: Never true   Transportation Needs: No  Transportation Needs (6/8/2024)    TRANSPORTATION NEEDS     Transportation : No   Physical Activity: Inactive (6/8/2024)    Exercise Vital Sign     Days of Exercise per Week: 0 days     Minutes of Exercise per Session: 0 min   Stress: No Stress Concern Present (6/8/2024)    [employer] [place] [employer] - Occupational Stress Questionnaire     Feeling of Stress : Not at all   Housing Stability: Low Risk  (6/8/2024)    Housing Stability Vital Sign     Unable to Pay for Housing in the Last Year: No     Homeless in the Last Year: No       Family History:  Family History   Problem Relation Name Age of Onset    Hypertension Mother      Diabetes Mother      Cataracts Mother      Hypertension Father      Hypertension Sister      Kidney disease Brother      Hypertension Brother      Heart disease Brother      Cancer Maternal Grandfather          Colon CA    Colon cancer Neg Hx      Esophageal cancer Neg Hx      Stomach cancer Neg Hx      Rectal cancer Neg Hx      Ulcerative colitis Neg Hx      Irritable bowel syndrome Neg Hx      Crohn's disease Neg Hx      Celiac disease Neg Hx      Glaucoma Neg Hx      Macular degeneration Neg Hx         Allergies and Medications: (updated and reviewed)  Review of patient's allergies indicates:   Allergen Reactions    No known allergies      Current Outpatient Medications   Medication Sig Dispense Refill    acetaminophen (TYLENOL) 650 MG TbSR Take 1 tablet (650 mg total) by mouth daily as needed (pain). 90 tablet 0    aspirin (ECOTRIN) 81 MG EC tablet Take 1 tablet (81 mg total) by mouth once daily. 30 tablet 11    atorvastatin (LIPITOR) 80 MG tablet Take 1 tablet (80 mg total) by mouth once daily. 90 tablet 3    carvediloL (COREG) 25 MG tablet Take 2 tablets (50 mg total) by mouth 2 (two) times daily with meals. 360 tablet 3    cinacalcet (SENSIPAR) 30 MG Tab Take by mouth.      clopidogreL (PLAVIX) 75 mg tablet Take 1 tablet (75 mg total) by mouth once daily. 90 tablet 3     epoetin philip (PROCRIT) 4,000 unit/mL injection Inject 1.93 mLs (7,720 Units total) into the skin every Mon, Wed, Fri.      ezetimibe (ZETIA) 10 mg tablet Take 1 tablet (10 mg total) by mouth once daily. 90 tablet 3    hydrALAZINE (APRESOLINE) 100 MG tablet Take 1 tablet (100 mg total) by mouth every 12 (twelve) hours. 60 tablet 2    isosorbide mononitrate (IMDUR) 120 MG 24 hr tablet Take 1 tablet (120 mg total) by mouth once daily. 90 tablet 0    methoxy peg-epoetin beta (MIRCERA INJ) 150 mcg.      methoxy peg-epoetin beta (MIRCERA INJ) 100 mcg.      methoxy peg-epoetin beta (MIRCERA INJ) 75 mcg.      minoxidiL (LONITEN) 2.5 MG tablet Take 2 tablets (5 mg total) by mouth 2 (two) times daily. 120 tablet 11    NIFEdipine (PROCARDIA-XL) 90 MG (OSM) 24 hr tablet Take 1 tablet (90 mg total) by mouth once daily. 90 tablet 0    nitroGLYCERIN (NITROSTAT) 0.4 MG SL tablet Place 1 tablet (0.4 mg total) under the tongue every 5 (five) minutes as needed for Chest pain. 100 tablet 0    ondansetron HCl (ZOFRAN ORAL) 4 mg.      polyethylene glycol (GLYCOLAX) 17 gram/dose powder Take 17 g by mouth daily as needed for Constipation. 238 g 0    sevelamer carbonate (RENVELA) 800 mg Tab Take 2 tablets (1,600 mg total) by mouth 3 (three) times daily with meals. 180 tablet 11    vit B complex-C-folic ac-zinc (RENAPLEX) 800 mcg- 12.5 mg Tab Take 1 tablet by mouth.      amLODIPine (NORVASC) 10 MG tablet Take 10 mg by mouth. (Patient not taking: Reported on 7/23/2024)      isosorbide mononitrate (IMDUR) 60 MG 24 hr tablet Take 1 tablet by mouth once daily. (Patient not taking: Reported on 7/23/2024)      melatonin (MELATIN) 3 mg tablet Take 1 tablet (3 mg total) by mouth nightly. (Patient not taking: Reported on 7/23/2024) 30 tablet 2    VELPHORO 500 mg Chew Take 2 tablets by mouth.       No current facility-administered medications for this visit.     Facility-Administered Medications Ordered in Other Visits   Medication Dose Route  Frequency Provider Last Rate Last Admin    lidocaine (PF) 10 mg/ml (1%) injection 10 mg  1 mL Intradermal Once Michelle Spence MD           Patient Care Team:  Mireya Larose MD as PCP - General (Internal Medicine)  [facility] as Post Acute Care Provider  Irving Burr MA as Care Coordinator         - The patient is given an After Visit Summary that lists all medications with directions, allergies, education, orders placed during this encounter and follow-up instructions.      - I have reviewed the patient's medical information including past medical, family, and social history sections including the medications and allergies.      - We discussed the patient's current medications.     This note was created by combination of typed  and MModal dictation.  Transcription errors may be present.  If there are any questions, please contact me.       Ольга Monson PA-C

## 2024-07-23 NOTE — PROGRESS NOTES
Health Maintenance Due   Topic     TETANUS VACCINE  Pt decline    COVID-19 Vaccine (3 - Moderna risk series) Consult pcp    Eye Exam  Consult pcp

## 2024-08-14 NOTE — ASSESSMENT & PLAN NOTE
- Did not  Lipitor yet, reordered  Lipitor 40 and encouraged to   - HDL 47, LDL 56        normal respiratory pattern

## 2024-08-23 ENCOUNTER — PATIENT OUTREACH (OUTPATIENT)
Dept: ADMINISTRATIVE | Facility: HOSPITAL | Age: 49
End: 2024-08-23
Payer: MEDICARE

## 2024-08-23 NOTE — PROGRESS NOTES
Population Health Chart Review & Patient Outreach Details      Additional [facility] Health Notes:    Patient on eye gap report. If already done will need records.   Unable to reach.           Updates Requested / Reviewed:      Updated Care Coordination Note and Care Everywhere         Health Maintenance Topics Overdue:      [facility] Score: 1     Eye Exam                       Health Maintenance Topic(s) Outreach Outcomes & Actions Taken:    Eye Exam - Outreach Outcomes & Actions Taken  : Unable to reach.

## 2024-09-02 ENCOUNTER — HOSPITAL ENCOUNTER (INPATIENT)
Facility: HOSPITAL | Age: 49
LOS: 1 days | Discharge: HOME | DRG: 189 | End: 2024-09-04
Attending: EMERGENCY MEDICINE | Admitting: INTERNAL MEDICINE
Payer: MEDICARE

## 2024-09-02 DIAGNOSIS — I10 ESSENTIAL HYPERTENSION: ICD-10-CM

## 2024-09-02 DIAGNOSIS — R07.89 CHEST DISCOMFORT: Primary | ICD-10-CM

## 2024-09-02 DIAGNOSIS — I25.10 TRIPLE VESSEL CORONARY ARTERY DISEASE: ICD-10-CM

## 2024-09-02 DIAGNOSIS — J96.01 ACUTE HYPOXEMIC RESPIRATORY FAILURE: ICD-10-CM

## 2024-09-02 DIAGNOSIS — R07.9 CHEST PAIN: ICD-10-CM

## 2024-09-02 DIAGNOSIS — R09.02 HYPOXIA: ICD-10-CM

## 2024-09-02 DIAGNOSIS — I21.4 NSTEMI (NON-ST ELEVATED MYOCARDIAL INFARCTION): ICD-10-CM

## 2024-09-02 LAB
ABO + RH BLD: NORMAL
ALBUMIN SERPL BCP-MCNC: 3 G/DL (ref 3.5–5.2)
ALBUMIN SERPL BCP-MCNC: 3.2 G/DL (ref 3.5–5.2)
ALLENS TEST: ABNORMAL
ALLENS TEST: NORMAL
ALP SERPL-CCNC: 67 U/L (ref 55–135)
ALP SERPL-CCNC: 67 U/L (ref 55–135)
ALT SERPL W/O P-5'-P-CCNC: 8 U/L (ref 10–44)
ALT SERPL W/O P-5'-P-CCNC: 8 U/L (ref 10–44)
ANION GAP SERPL CALC-SCNC: 11 MMOL/L (ref 8–16)
ANION GAP SERPL CALC-SCNC: 12 MMOL/L (ref 8–16)
AST SERPL-CCNC: 11 U/L (ref 10–40)
AST SERPL-CCNC: 11 U/L (ref 10–40)
BASOPHILS # BLD AUTO: 0.1 K/UL (ref 0–0.2)
BASOPHILS NFR BLD: 0.7 % (ref 0–1.9)
BILIRUB SERPL-MCNC: 1.1 MG/DL (ref 0.1–1)
BILIRUB SERPL-MCNC: 1.3 MG/DL (ref 0.1–1)
BLD GP AB SCN CELLS X3 SERPL QL: NORMAL
BNP SERPL-MCNC: 1956 PG/ML (ref 0–99)
BUN SERPL-MCNC: 26 MG/DL (ref 6–20)
BUN SERPL-MCNC: 28 MG/DL (ref 6–20)
CALCIUM SERPL-MCNC: 10 MG/DL (ref 8.7–10.5)
CALCIUM SERPL-MCNC: 9.9 MG/DL (ref 8.7–10.5)
CHLORIDE SERPL-SCNC: 104 MMOL/L (ref 95–110)
CHLORIDE SERPL-SCNC: 105 MMOL/L (ref 95–110)
CHOLEST SERPL-MCNC: 131 MG/DL (ref 120–199)
CHOLEST/HDLC SERPL: 2 {RATIO} (ref 2–5)
CO2 SERPL-SCNC: 23 MMOL/L (ref 23–29)
CO2 SERPL-SCNC: 24 MMOL/L (ref 23–29)
CREAT SERPL-MCNC: 6 MG/DL (ref 0.5–1.4)
CREAT SERPL-MCNC: 6.9 MG/DL (ref 0.5–1.4)
DIFFERENTIAL METHOD BLD: ABNORMAL
EOSINOPHIL # BLD AUTO: 0.2 K/UL (ref 0–0.5)
EOSINOPHIL NFR BLD: 1.7 % (ref 0–8)
ERYTHROCYTE [DISTWIDTH] IN BLOOD BY AUTOMATED COUNT: 16.1 % (ref 11.5–14.5)
EST. GFR  (NO RACE VARIABLE): 10.9 ML/MIN/1.73 M^2
EST. GFR  (NO RACE VARIABLE): 9.3 ML/MIN/1.73 M^2
GLUCOSE SERPL-MCNC: 111 MG/DL (ref 70–110)
GLUCOSE SERPL-MCNC: 94 MG/DL (ref 70–110)
HCO3 UR-SCNC: 27.2 MMOL/L (ref 24–28)
HCT VFR BLD AUTO: 32.3 % (ref 40–54)
HCT VFR BLD CALC: 33 %PCV (ref 36–54)
HDLC SERPL-MCNC: 67 MG/DL (ref 40–75)
HDLC SERPL: 51.1 % (ref 20–50)
HGB BLD-MCNC: 9.9 G/DL (ref 14–18)
IMM GRANULOCYTES # BLD AUTO: 0.06 K/UL (ref 0–0.04)
IMM GRANULOCYTES NFR BLD AUTO: 0.4 % (ref 0–0.5)
INFLUENZA A, MOLECULAR: NOT DETECTED
INFLUENZA B, MOLECULAR: NOT DETECTED
LDH SERPL L TO P-CCNC: 0.54 MMOL/L (ref 0.36–1.25)
LDLC SERPL CALC-MCNC: 47.4 MG/DL (ref 63–159)
LYMPHOCYTES # BLD AUTO: 1.3 K/UL (ref 1–4.8)
LYMPHOCYTES NFR BLD: 8.9 % (ref 18–48)
MAGNESIUM SERPL-MCNC: 1.8 MG/DL (ref 1.6–2.6)
MCH RBC QN AUTO: 29.3 PG (ref 27–31)
MCHC RBC AUTO-ENTMCNC: 30.7 G/DL (ref 32–36)
MCV RBC AUTO: 96 FL (ref 82–98)
MONOCYTES # BLD AUTO: 1.3 K/UL (ref 0.3–1)
MONOCYTES NFR BLD: 9.4 % (ref 4–15)
NEUTROPHILS # BLD AUTO: 11.1 K/UL (ref 1.8–7.7)
NEUTROPHILS NFR BLD: 78.9 % (ref 38–73)
NONHDLC SERPL-MCNC: 64 MG/DL
NRBC BLD-RTO: 0 /100 WBC
PCO2 BLDA: 37.1 MMHG (ref 35–45)
PH SMN: 7.47 [PH] (ref 7.35–7.45)
PLATELET # BLD AUTO: 300 K/UL (ref 150–450)
PMV BLD AUTO: 10.1 FL (ref 9.2–12.9)
PO2 BLDA: 79 MMHG (ref 80–100)
POC BE: 4 MMOL/L
POC IONIZED CALCIUM: 1.28 MMOL/L (ref 1.06–1.42)
POC SATURATED O2: 96 % (ref 95–100)
POC TCO2: 28 MMOL/L (ref 23–27)
POCT GLUCOSE: 89 MG/DL (ref 70–110)
POTASSIUM BLD-SCNC: 3.9 MMOL/L (ref 3.5–5.1)
POTASSIUM SERPL-SCNC: 3.4 MMOL/L (ref 3.5–5.1)
POTASSIUM SERPL-SCNC: 4.1 MMOL/L (ref 3.5–5.1)
PROT SERPL-MCNC: 7.5 G/DL (ref 6–8.4)
PROT SERPL-MCNC: 7.6 G/DL (ref 6–8.4)
RBC # BLD AUTO: 3.38 M/UL (ref 4.6–6.2)
RSV AG BY MOLECULAR METHOD: NOT DETECTED
SAMPLE: ABNORMAL
SAMPLE: NORMAL
SARS-COV-2 RNA RESP QL NAA+PROBE: NOT DETECTED
SITE: ABNORMAL
SITE: NORMAL
SODIUM BLD-SCNC: 138 MMOL/L (ref 136–145)
SODIUM SERPL-SCNC: 139 MMOL/L (ref 136–145)
SODIUM SERPL-SCNC: 140 MMOL/L (ref 136–145)
SPECIMEN OUTDATE: NORMAL
TRIGL SERPL-MCNC: 83 MG/DL (ref 30–150)
TROPONIN I SERPL DL<=0.01 NG/ML-MCNC: 0.09 NG/ML (ref 0–0.03)
TROPONIN I SERPL DL<=0.01 NG/ML-MCNC: 0.1 NG/ML (ref 0–0.03)
TROPONIN I SERPL DL<=0.01 NG/ML-MCNC: 0.1 NG/ML (ref 0–0.03)
TROPONIN I SERPL DL<=0.01 NG/ML-MCNC: 0.11 NG/ML (ref 0–0.03)
TROPONIN I SERPL DL<=0.01 NG/ML-MCNC: 0.11 NG/ML (ref 0–0.03)
WBC # BLD AUTO: 14.01 K/UL (ref 3.9–12.7)

## 2024-09-02 PROCEDURE — 86900 BLOOD TYPING SEROLOGIC ABO: CPT

## 2024-09-02 PROCEDURE — 93005 ELECTROCARDIOGRAM TRACING: CPT

## 2024-09-02 PROCEDURE — G0378 HOSPITAL OBSERVATION PER HR: HCPCS

## 2024-09-02 PROCEDURE — 80053 COMPREHEN METABOLIC PANEL: CPT | Mod: 91 | Performed by: STUDENT IN AN ORGANIZED HEALTH CARE EDUCATION/TRAINING PROGRAM

## 2024-09-02 PROCEDURE — 83605 ASSAY OF LACTIC ACID: CPT

## 2024-09-02 PROCEDURE — 25000003 PHARM REV CODE 250: Performed by: STUDENT IN AN ORGANIZED HEALTH CARE EDUCATION/TRAINING PROGRAM

## 2024-09-02 PROCEDURE — 84295 ASSAY OF SERUM SODIUM: CPT

## 2024-09-02 PROCEDURE — 25000003 PHARM REV CODE 250: Performed by: EMERGENCY MEDICINE

## 2024-09-02 PROCEDURE — 93010 ELECTROCARDIOGRAM REPORT: CPT | Mod: ,,, | Performed by: INTERNAL MEDICINE

## 2024-09-02 PROCEDURE — 85014 HEMATOCRIT: CPT

## 2024-09-02 PROCEDURE — 96372 THER/PROPH/DIAG INJ SC/IM: CPT

## 2024-09-02 PROCEDURE — 0241U SARS-COV2 (COVID) WITH FLU/RSV BY PCR: CPT

## 2024-09-02 PROCEDURE — 25000242 PHARM REV CODE 250 ALT 637 W/ HCPCS

## 2024-09-02 PROCEDURE — 86901 BLOOD TYPING SEROLOGIC RH(D): CPT

## 2024-09-02 PROCEDURE — 85025 COMPLETE CBC W/AUTO DIFF WBC: CPT | Performed by: EMERGENCY MEDICINE

## 2024-09-02 PROCEDURE — 36600 WITHDRAWAL OF ARTERIAL BLOOD: CPT

## 2024-09-02 PROCEDURE — 82803 BLOOD GASES ANY COMBINATION: CPT

## 2024-09-02 PROCEDURE — 5A0935A ASSISTANCE WITH RESPIRATORY VENTILATION, LESS THAN 24 CONSECUTIVE HOURS, HIGH NASAL FLOW/VELOCITY: ICD-10-PCS | Performed by: INTERNAL MEDICINE

## 2024-09-02 PROCEDURE — 25000003 PHARM REV CODE 250

## 2024-09-02 PROCEDURE — 94761 N-INVAS EAR/PLS OXIMETRY MLT: CPT | Mod: XB

## 2024-09-02 PROCEDURE — 82962 GLUCOSE BLOOD TEST: CPT

## 2024-09-02 PROCEDURE — 83735 ASSAY OF MAGNESIUM: CPT | Performed by: STUDENT IN AN ORGANIZED HEALTH CARE EDUCATION/TRAINING PROGRAM

## 2024-09-02 PROCEDURE — 80061 LIPID PANEL: CPT

## 2024-09-02 PROCEDURE — 99223 1ST HOSP IP/OBS HIGH 75: CPT | Mod: ,,, | Performed by: HOSPITALIST

## 2024-09-02 PROCEDURE — 27100171 HC OXYGEN HIGH FLOW UP TO 24 HOURS

## 2024-09-02 PROCEDURE — 83880 ASSAY OF NATRIURETIC PEPTIDE: CPT

## 2024-09-02 PROCEDURE — 84484 ASSAY OF TROPONIN QUANT: CPT | Mod: 91

## 2024-09-02 PROCEDURE — 63600175 PHARM REV CODE 636 W HCPCS

## 2024-09-02 PROCEDURE — 94640 AIRWAY INHALATION TREATMENT: CPT

## 2024-09-02 PROCEDURE — 86850 RBC ANTIBODY SCREEN: CPT

## 2024-09-02 PROCEDURE — 96374 THER/PROPH/DIAG INJ IV PUSH: CPT

## 2024-09-02 PROCEDURE — 82330 ASSAY OF CALCIUM: CPT

## 2024-09-02 PROCEDURE — 84484 ASSAY OF TROPONIN QUANT: CPT | Performed by: EMERGENCY MEDICINE

## 2024-09-02 PROCEDURE — 80053 COMPREHEN METABOLIC PANEL: CPT | Performed by: EMERGENCY MEDICINE

## 2024-09-02 PROCEDURE — 99285 EMERGENCY DEPT VISIT HI MDM: CPT | Mod: 25

## 2024-09-02 PROCEDURE — 84132 ASSAY OF SERUM POTASSIUM: CPT

## 2024-09-02 PROCEDURE — 99900035 HC TECH TIME PER 15 MIN (STAT)

## 2024-09-02 RX ORDER — IBUPROFEN 200 MG
24 TABLET ORAL
Status: DISCONTINUED | OUTPATIENT
Start: 2024-09-02 | End: 2024-09-03

## 2024-09-02 RX ORDER — EZETIMIBE 10 MG/1
10 TABLET ORAL DAILY
Status: DISCONTINUED | OUTPATIENT
Start: 2024-09-03 | End: 2024-09-04 | Stop reason: HOSPADM

## 2024-09-02 RX ORDER — SODIUM CHLORIDE 0.9 % (FLUSH) 0.9 %
10 SYRINGE (ML) INJECTION
Status: CANCELLED | OUTPATIENT
Start: 2024-09-02

## 2024-09-02 RX ORDER — CINACALCET 30 MG/1
90 TABLET, FILM COATED ORAL
Status: DISCONTINUED | OUTPATIENT
Start: 2024-09-03 | End: 2024-09-04 | Stop reason: HOSPADM

## 2024-09-02 RX ORDER — HEPARIN SODIUM 1000 [USP'U]/ML
1000 INJECTION, SOLUTION INTRAVENOUS; SUBCUTANEOUS
Status: CANCELLED | OUTPATIENT
Start: 2024-09-02 | End: 2024-09-03

## 2024-09-02 RX ORDER — SEVELAMER CARBONATE 800 MG/1
2400 TABLET, FILM COATED ORAL
Status: DISCONTINUED | OUTPATIENT
Start: 2024-09-03 | End: 2024-09-03

## 2024-09-02 RX ORDER — POLYETHYLENE GLYCOL 3350 17 G/17G
17 POWDER, FOR SOLUTION ORAL DAILY
Status: DISCONTINUED | OUTPATIENT
Start: 2024-09-03 | End: 2024-09-04 | Stop reason: HOSPADM

## 2024-09-02 RX ORDER — IBUPROFEN 200 MG
24 TABLET ORAL
Status: CANCELLED | OUTPATIENT
Start: 2024-09-02

## 2024-09-02 RX ORDER — IBUPROFEN 200 MG
16 TABLET ORAL
Status: CANCELLED | OUTPATIENT
Start: 2024-09-02

## 2024-09-02 RX ORDER — ASPIRIN 81 MG/1
81 TABLET ORAL DAILY
Status: DISCONTINUED | OUTPATIENT
Start: 2024-09-03 | End: 2024-09-04 | Stop reason: HOSPADM

## 2024-09-02 RX ORDER — NITROGLYCERIN 0.4 MG/1
0.4 TABLET SUBLINGUAL EVERY 5 MIN PRN
Status: DISCONTINUED | OUTPATIENT
Start: 2024-09-02 | End: 2024-09-04 | Stop reason: HOSPADM

## 2024-09-02 RX ORDER — GLUCAGON 1 MG
1 KIT INJECTION
Status: CANCELLED | OUTPATIENT
Start: 2024-09-02

## 2024-09-02 RX ORDER — LABETALOL HYDROCHLORIDE 5 MG/ML
10 INJECTION, SOLUTION INTRAVENOUS
Status: DISCONTINUED | OUTPATIENT
Start: 2024-09-03 | End: 2024-09-02

## 2024-09-02 RX ORDER — NALOXONE HCL 0.4 MG/ML
0.02 VIAL (ML) INJECTION
Status: CANCELLED | OUTPATIENT
Start: 2024-09-02

## 2024-09-02 RX ORDER — LABETALOL HYDROCHLORIDE 5 MG/ML
10 INJECTION, SOLUTION INTRAVENOUS DAILY PRN
Status: DISCONTINUED | OUTPATIENT
Start: 2024-09-03 | End: 2024-09-03

## 2024-09-02 RX ORDER — SODIUM CHLORIDE 9 MG/ML
INJECTION, SOLUTION INTRAVENOUS ONCE
Status: CANCELLED | OUTPATIENT
Start: 2024-09-02 | End: 2024-09-02

## 2024-09-02 RX ORDER — SODIUM CHLORIDE 0.9 % (FLUSH) 0.9 %
10 SYRINGE (ML) INJECTION
Status: DISCONTINUED | OUTPATIENT
Start: 2024-09-02 | End: 2024-09-04 | Stop reason: HOSPADM

## 2024-09-02 RX ORDER — HYDRALAZINE HYDROCHLORIDE 20 MG/ML
5 INJECTION INTRAMUSCULAR; INTRAVENOUS ONCE
Status: COMPLETED | OUTPATIENT
Start: 2024-09-02 | End: 2024-09-02

## 2024-09-02 RX ORDER — NIFEDIPINE 30 MG/1
90 TABLET, EXTENDED RELEASE ORAL DAILY
Status: DISCONTINUED | OUTPATIENT
Start: 2024-09-02 | End: 2024-09-04 | Stop reason: HOSPADM

## 2024-09-02 RX ORDER — BENZONATATE 100 MG/1
100 CAPSULE ORAL 3 TIMES DAILY PRN
Status: DISCONTINUED | OUTPATIENT
Start: 2024-09-02 | End: 2024-09-04 | Stop reason: HOSPADM

## 2024-09-02 RX ORDER — CLOPIDOGREL BISULFATE 75 MG/1
75 TABLET ORAL DAILY
Status: DISCONTINUED | OUTPATIENT
Start: 2024-09-03 | End: 2024-09-04 | Stop reason: HOSPADM

## 2024-09-02 RX ORDER — HEPARIN SODIUM 5000 [USP'U]/ML
5000 INJECTION, SOLUTION INTRAVENOUS; SUBCUTANEOUS EVERY 12 HOURS
Status: DISCONTINUED | OUTPATIENT
Start: 2024-09-02 | End: 2024-09-03

## 2024-09-02 RX ORDER — INSULIN ASPART 100 [IU]/ML
0-5 INJECTION, SOLUTION INTRAVENOUS; SUBCUTANEOUS
Status: DISCONTINUED | OUTPATIENT
Start: 2024-09-02 | End: 2024-09-04 | Stop reason: HOSPADM

## 2024-09-02 RX ORDER — GLUCAGON 1 MG
1 KIT INJECTION
Status: DISCONTINUED | OUTPATIENT
Start: 2024-09-02 | End: 2024-09-03

## 2024-09-02 RX ORDER — IBUPROFEN 200 MG
16 TABLET ORAL
Status: DISCONTINUED | OUTPATIENT
Start: 2024-09-02 | End: 2024-09-03

## 2024-09-02 RX ORDER — ATORVASTATIN CALCIUM 40 MG/1
80 TABLET, FILM COATED ORAL DAILY
Status: DISCONTINUED | OUTPATIENT
Start: 2024-09-03 | End: 2024-09-04 | Stop reason: HOSPADM

## 2024-09-02 RX ORDER — GUAIFENESIN 100 MG/5ML
200 SOLUTION ORAL EVERY 4 HOURS PRN
Status: DISCONTINUED | OUTPATIENT
Start: 2024-09-02 | End: 2024-09-04 | Stop reason: HOSPADM

## 2024-09-02 RX ORDER — IPRATROPIUM BROMIDE AND ALBUTEROL SULFATE 2.5; .5 MG/3ML; MG/3ML
3 SOLUTION RESPIRATORY (INHALATION) EVERY 4 HOURS
Status: DISCONTINUED | OUTPATIENT
Start: 2024-09-02 | End: 2024-09-04 | Stop reason: HOSPADM

## 2024-09-02 RX ORDER — TALC
6 POWDER (GRAM) TOPICAL NIGHTLY PRN
Status: CANCELLED | OUTPATIENT
Start: 2024-09-02

## 2024-09-02 RX ORDER — ASPIRIN 325 MG
325 TABLET ORAL
Status: COMPLETED | OUTPATIENT
Start: 2024-09-02 | End: 2024-09-02

## 2024-09-02 RX ORDER — TALC
6 POWDER (GRAM) TOPICAL NIGHTLY PRN
Status: DISCONTINUED | OUTPATIENT
Start: 2024-09-02 | End: 2024-09-04 | Stop reason: HOSPADM

## 2024-09-02 RX ORDER — CARVEDILOL 25 MG/1
50 TABLET ORAL 2 TIMES DAILY WITH MEALS
Status: DISCONTINUED | OUTPATIENT
Start: 2024-09-02 | End: 2024-09-04 | Stop reason: HOSPADM

## 2024-09-02 RX ORDER — SODIUM CHLORIDE 0.9 % (FLUSH) 0.9 %
10 SYRINGE (ML) INJECTION
Status: DISCONTINUED | OUTPATIENT
Start: 2024-09-02 | End: 2024-09-03

## 2024-09-02 RX ORDER — ONDANSETRON HYDROCHLORIDE 2 MG/ML
4 INJECTION, SOLUTION INTRAVENOUS EVERY 8 HOURS PRN
Status: CANCELLED | OUTPATIENT
Start: 2024-09-02

## 2024-09-02 RX ORDER — HYDRALAZINE HYDROCHLORIDE 20 MG/ML
10 INJECTION INTRAMUSCULAR; INTRAVENOUS ONCE
Status: DISCONTINUED | OUTPATIENT
Start: 2024-09-02 | End: 2024-09-02

## 2024-09-02 RX ORDER — ACETAMINOPHEN 325 MG/1
650 TABLET ORAL EVERY 4 HOURS PRN
Status: CANCELLED | OUTPATIENT
Start: 2024-09-02

## 2024-09-02 RX ORDER — PROCHLORPERAZINE EDISYLATE 5 MG/ML
5 INJECTION INTRAMUSCULAR; INTRAVENOUS EVERY 6 HOURS PRN
Status: CANCELLED | OUTPATIENT
Start: 2024-09-02

## 2024-09-02 RX ADMIN — CARVEDILOL 50 MG: 25 TABLET, FILM COATED ORAL at 06:09

## 2024-09-02 RX ADMIN — IPRATROPIUM BROMIDE AND ALBUTEROL SULFATE 3 ML: 2.5; .5 SOLUTION RESPIRATORY (INHALATION) at 08:09

## 2024-09-02 RX ADMIN — NIFEDIPINE 90 MG: 60 TABLET, FILM COATED, EXTENDED RELEASE ORAL at 07:09

## 2024-09-02 RX ADMIN — BENZONATATE 100 MG: 100 CAPSULE ORAL at 04:09

## 2024-09-02 RX ADMIN — GUAIFENESIN 200 MG: 200 SOLUTION ORAL at 07:09

## 2024-09-02 RX ADMIN — ASPIRIN 325 MG ORAL TABLET 325 MG: 325 PILL ORAL at 12:09

## 2024-09-02 RX ADMIN — HYDRALAZINE HYDROCHLORIDE 5 MG: 20 INJECTION, SOLUTION INTRAMUSCULAR; INTRAVENOUS at 08:09

## 2024-09-02 RX ADMIN — HEPARIN SODIUM 5000 UNITS: 5000 INJECTION INTRAVENOUS; SUBCUTANEOUS at 11:09

## 2024-09-02 NOTE — ED TRIAGE NOTES
Haroldo Dickinson, a 48 y.o. male presents to the ED w/ complaint of chest pain that started on Saturday. Pt states that pain is relieved with nitro. Pt does have cardiac history and is on dialysis. Pt goes Mon/Wed/Fri and last tx was this morning.     Triage note:  Chief Complaint   Patient presents with    Chest Pain     Has stents     Review of patient's allergies indicates:   Allergen Reactions    No known allergies      Past Medical History:   Diagnosis Date    CAD (coronary artery disease), native coronary artery 11/21/2019    Cataract     Diabetes mellitus     Diabetic retinopathy     Dialysis patient     DM type 2 causing renal disease, not at goal     ESRD (end stage renal disease) started dialysis 01/2014 06/05/2014    History of colon polyps 02/10/2021    History of COVID-19 12/29/2022    Hyperparathyroidism, secondary renal 06/05/2014    Hypertension     NSTEMI (non-ST elevated myocardial infarction) 12/21/2013    Organ transplant candidate 06/05/2014    Pleural effusion 06/07/2024    Pneumonia     Post PTCA 08/26/2020    RCA [facility] 7-25-20    Renal cell carcinoma of right kidney     Renal hypertension     Stroke

## 2024-09-02 NOTE — ASSESSMENT & PLAN NOTE
Anemia is likely due to chronic disease due to ESRD. Most recent hemoglobin and hematocrit are listed below.  Recent Labs     09/02/24  1223   HGB 9.9*   HCT 32.3*     Plan  - Monitor serial CBC: Daily  - Transfuse PRBC if patient becomes hemodynamically unstable, symptomatic or H/H drops below 7/21.  - Patient has not received any PRBC transfusions to date  - Patient's anemia is currently stable

## 2024-09-02 NOTE — ASSESSMENT & PLAN NOTE
Creatine stable for now. BMP reviewed- noted Estimated Creatinine Clearance: 14.4 mL/min (A) (based on SCr of 6 mg/dL (H)). according to latest data. Based on current GFR, CKD stage is end stage.  Monitor UOP and serial BMP and adjust therapy as needed. Renally dose meds. Avoid nephrotoxic medications and procedures.    A1c July 2024 4.3%    Plan:  - low dose SS insulin

## 2024-09-02 NOTE — ED NOTES
Assumed care of the patient. Report received from PORTIA Chiu. Pt on continuous cardiac monitoring Pt in hospital gown, side rails up X2, bed low and locked, and call light is placed within reach. No family/visitors at bedside at this time. Pt denies any complaints or needs.     Haroldo Dickinson, a 48 y.o. male presents to the ED w/ complaint of chest pain    Triage note:  Chief Complaint   Patient presents with    Chest Pain     Has stents     Review of patient's allergies indicates:   Allergen Reactions    No known allergies      Past Medical History:   Diagnosis Date    CAD (coronary artery disease), native coronary artery 11/21/2019    Cataract     Diabetes mellitus     Diabetic retinopathy     Dialysis patient     DM type 2 causing renal disease, not at goal     ESRD (end stage renal disease) started dialysis 01/2014 06/05/2014    History of colon polyps 02/10/2021    History of COVID-19 12/29/2022    Hyperparathyroidism, secondary renal 06/05/2014    Hypertension     NSTEMI (non-ST elevated myocardial infarction) 12/21/2013    Organ transplant candidate 06/05/2014    Pleural effusion 06/07/2024    Pneumonia     Post PTCA 08/26/2020    RCA [facility] 7-25-20    Renal cell carcinoma of right kidney     Renal hypertension     Stroke

## 2024-09-02 NOTE — ASSESSMENT & PLAN NOTE
Chronic, uncontrolled. Latest blood pressure and vitals reviewed-     Temp:  [99.1 °F (37.3 °C)]   Pulse:  [72-86]   Resp:  [18-36]   BP: (143-181)/(80-91)   SpO2:  [95 %-97 %] .   Home meds for hypertension were reviewed and noted below.   Hypertension Medications               carvediloL (COREG) 25 MG tablet Take 2 tablets (50 mg total) by mouth 2 (two) times daily with meals.    hydrALAZINE (APRESOLINE) 100 MG tablet Take 1 tablet (100 mg total) by mouth every 12 (twelve) hours.    NIFEdipine (PROCARDIA-XL) 90 MG (OSM) 24 hr tablet Take 1 tablet (90 mg total) by mouth once daily.    nitroGLYCERIN (NITROSTAT) 0.4 MG SL tablet Place 1 tablet (0.4 mg total) under the tongue every 5 (five) minutes as needed for Chest pain.    amLODIPine (NORVASC) 10 MG tablet Take 10 mg by mouth.    isosorbide mononitrate (IMDUR) 120 MG 24 hr tablet Take 1 tablet (120 mg total) by mouth once daily.    isosorbide mononitrate (IMDUR) 60 MG 24 hr tablet Take 1 tablet by mouth once daily.    minoxidiL (LONITEN) 2.5 MG tablet Take 2 tablets (5 mg total) by mouth 2 (two) times daily.            While in the hospital, will manage blood pressure as follows; Adjust home antihypertensive regimen as follows-       - home nifedipine, carvedilol   - add hydralazine after monitoring response to the above    Will utilize p.r.n. blood pressure medication only if patient's blood pressure greater than 180/110 and he develops symptoms such as worsening chest pain or shortness of breath.

## 2024-09-02 NOTE — ASSESSMENT & PLAN NOTE
Central chest pain radiating to L arm partially relieved by nitroglycerin, troponin 0.095 -> 0.101 in ED. Repeat 0.086. BNP 1.9k. CXR suggestive of pulmonary edema, L pleural effusion.       Ddx includes type 2 NSTEMI, acute compensated heart failure, viral pericarditis, GERD. Less likely PE, aortic dissection.     Plan:  - consult cardiology, appreciate recs. Recommend treating hypertension and following up troponin for now  - nephrology consulted for HD, likely need to fluid removal  - trend troponin  - EKG if chest pain  - manage hypertension   - continue home coreg, nifedipine   - add back hydralazine if persistently hypertensive  - f/u echocardiogram, covid/flu

## 2024-09-02 NOTE — HPI
49yo M with PMHx of ESRD on HD MWF (received HD on day of admission), RCC s/p bilateral nephrectomies, CAD (s/p [facility] of RCA 2020) with coronary angio in Nov 2023 with three vessel disease (LAD 90%, %, LAD 75%), HTN, T2DM, CVA w/ residual R-sided deficits who presented with sharp central chest pain radiating to the L arm for the last 2 nights (since 8/31). Pt took home nitroglycerin with partial relief 6/10 -> 3/10. Notes he had one episode of associated nausea and vomiting during the first episode of chest pain. He has also developed a non-productive cough around the same time with associated shortness of breath. No known sick contacts. Denies fevers/chills. He is sleeping with more pillows at night but denies ankle/leg swelling. He does not make urine.     In ED, hemodynamically stable but hypertensive to the 170's systolic. WBC 14 (ANC 11.1), K 3.4, BUN 26, Cr 6.0, bilirubin 1.1. Troponin 0.095 -> 0.101. CXR suspicious for pulmonary volume overload as well as left pleural effusion with likely associated compressive atelectasis.

## 2024-09-02 NOTE — ED PROVIDER NOTES
Encounter Date: 9/2/2024       History     Chief Complaint   Patient presents with    Chest Pain     Has stents     48-year-old male, history of ESRD on hemodialysis Monday Wednesday Friday, CAD, hypertension, stroke, diabetes, complaining of chest pain since Saturday, 2 days ago.  Patient describes it as a tingling sensation in his left upper chest and left proximal arm.  Pain is worse with lying flat.  It improves with sitting forward.  He has also had some difficulty breathing, particularly with sleeping.  He denies any runny nose or sore throat but has had a mild cough, no fevers or chills.  Patient had his full hemodialysis session this morning prior to arrival here.    The history is provided by the patient.     Review of patient's allergies indicates:   Allergen Reactions    No known allergies      Past Medical History:   Diagnosis Date    CAD (coronary artery disease), native coronary artery 11/21/2019    Cataract     Diabetes mellitus     Diabetic retinopathy     Dialysis patient     DM type 2 causing renal disease, not at goal     ESRD (end stage renal disease) started dialysis 01/2014 06/05/2014    History of colon polyps 02/10/2021    History of COVID-19 12/29/2022    Hyperparathyroidism, secondary renal 06/05/2014    Hypertension     NSTEMI (non-ST elevated myocardial infarction) 12/21/2013    Organ transplant candidate 06/05/2014    Pleural effusion 06/07/2024    Pneumonia     Post PTCA 08/26/2020    RCA [facility]ARY 7-25-20    Renal cell carcinoma of right kidney     Renal hypertension     Stroke      Past Surgical History:   Procedure Laterality Date    ANGIOGRAM, CORONARY, WITH LEFT HEART CATHETERIZATION N/A 7/1/2021    Procedure: Angiogram, Coronary, with Left Heart Cath;  Surgeon: Miki Zendejas MD;  Location: [facility] CATH LAB;  Service: Cardiology;  Laterality: N/A;    ANGIOGRAM, CORONARY, WITH LEFT HEART CATHETERIZATION N/A 11/28/2023    Procedure: Angiogram, Coronary, with Left Heart Cath;  Surgeon:  Noah Pendleton MD;  Location: [facility] CATH LAB;  Service: Cardiology;  Laterality: N/A;    COLONOSCOPY N/A 5/3/2017    Procedure: COLONOSCOPY;  Surgeon: Rufino Carpenter MD;  Location: [facility] (4TH FLR);  Service: Endoscopy;  Laterality: N/A;  pt states can only schedule on Wednesdays    COLONOSCOPY N/A 2/9/2021    Procedure: COLONOSCOPY;  Surgeon: Edil Wong MD;  Location: [facility] (4TH FLR);  Service: Endoscopy;  Laterality: N/A;  Dialysis MWF/ labwork day of procedure  right arm aceess  per Dr. Colin pt can hold Plavix 5 days prior see note- sm  COVID test on 2/6/21 at [facility] - sm    COLONOSCOPY N/A 2/10/2021    Procedure: COLONOSCOPY;  Surgeon: Robert Ayers MD;  Location: [facility] (4TH FLR);  Service: Endoscopy;  Laterality: N/A;  rescheduled due to poor bowel prep-BB  negative covid screening 2/6/21-BB  dialysis M-W-F-BB  okay to r/s for 2/9/21 and to hold Plavix per Dr. KIRAN Wong-BB  labs same day-BB    CORONARY STENT PLACEMENT N/A 7/25/2019    Procedure: INSERTION, STENT, CORONARY ARTERY;  Surgeon: Miki Zendejas MD;  Location: [facility] CATH LAB;  Service: Cardiology;  Laterality: N/A;    DIALYSIS FISTULA CREATION      FISTULOGRAM N/A 2/18/2019    Procedure: Fistulogram;  Surgeon: Yaneth De La Cruz MD;  Location: [facility] CATH LAB;  Service: Cardiology;  Laterality: N/A;    FISTULOGRAM Right 7/23/2019    Procedure: Fistulogram;  Surgeon: Oz Cordoba MD;  Location: [facility] CATH LAB;  Service: Cardiology;  Laterality: Right;    LAPAROSCOPIC ROBOT-ASSISTED SURGICAL REMOVAL OF KIDNEY USING DA JAIME XI Right 5/19/2022    Procedure: XI ROBOTIC NEPHRECTOMY;  Surgeon: Aidan Hendricks MD;  Location: [address];  Service: Urology;  Laterality: Right;  3hrs    LAPAROSCOPIC ROBOT-ASSISTED SURGICAL REMOVAL OF KIDNEY USING DA JAIME XI Left 1/5/2023    Procedure: XI ROBOTIC NEPHRECTOMY;  Surgeon: Aidan Hendricks MD;  Location: [address];  Service: Urology;  Laterality: Left;  4 hrs    LEFT HEART  CATHETERIZATION Left 7/25/2019    Procedure: Left heart cath;  Surgeon: Miki Zendejas MD;  Location: [facility] CATH LAB;  Service: Cardiology;  Laterality: Left;    REPAIR  1/5/2023    Procedure: REPAIR; COLOTOMY;  Surgeon: Maikel Lamb MD;  Location: [facility] OR [address] [place] [address];  Service: General;;    RETINAL LASER PROCEDURE Bilateral 2018 or 2017    Dr. Rothman    VASCULAR SURGERY       Family History   Problem Relation Name Age of Onset    Hypertension Mother      Diabetes Mother      Cataracts Mother      Hypertension Father      Hypertension Sister      Kidney disease Brother      Hypertension Brother      Heart disease Brother      Cancer Maternal Grandfather          Colon CA    Colon cancer Neg Hx      Esophageal cancer Neg Hx      Stomach cancer Neg Hx      Rectal cancer Neg Hx      Ulcerative colitis Neg Hx      Irritable bowel syndrome Neg Hx      Crohn's disease Neg Hx      Celiac disease Neg Hx      Glaucoma Neg Hx      Macular degeneration Neg Hx       Social History     Tobacco Use    Smoking status: Never    Smokeless tobacco: Never   Substance Use Topics    Alcohol use: Not Currently     Comment: One drink a month    Drug use: No     Review of Systems    Physical Exam     Initial Vitals [09/02/24 1137]   BP Pulse Resp Temp SpO2   (!) 143/83 72 18 99.1 °F (37.3 °C) 95 %      MAP       --         Physical Exam    Nursing note and vitals reviewed.  Constitutional: Vital signs are normal. He appears well-developed and well-nourished. He is not diaphoretic.  Non-toxic appearance. He does not appear ill. No distress.   HENT:   Head: Normocephalic and atraumatic.   Mouth/Throat: Oropharynx is clear and moist and mucous membranes are normal. Mucous membranes are not dry.   Eyes: Conjunctivae and lids are normal.   Neck: Neck supple.   Normal range of motion.  Cardiovascular:  Normal rate.           Pulmonary/Chest: No respiratory distress. He has rhonchi.   L lung base + rhonchi   Musculoskeletal:         General:  No edema.      Cervical back: Normal range of motion and neck supple.     Neurological: He is alert and oriented to person, place, and time. He has normal strength.   Skin: Skin is dry and intact. No pallor.   Psychiatric: He has a normal mood and affect. His speech is normal and behavior is normal.         ED Course   Procedures  Labs Reviewed   CBC W/ AUTO DIFFERENTIAL - Abnormal       Result Value    WBC 14.01 (*)     RBC 3.38 (*)     Hemoglobin 9.9 (*)     Hematocrit 32.3 (*)     MCV 96      MCH 29.3      MCHC 30.7 (*)     RDW 16.1 (*)     Platelets 300      MPV 10.1      Immature Granulocytes 0.4      Gran # (ANC) 11.1 (*)     Immature Grans (Abs) 0.06 (*)     Lymph # 1.3      Mono # 1.3 (*)     Eos # 0.2      Baso # 0.10      nRBC 0      Gran % 78.9 (*)     Lymph % 8.9 (*)     Mono % 9.4      Eosinophil % 1.7      Basophil % 0.7      Differential Method Automated     TROPONIN I - Abnormal    Troponin I 0.095 (*)    TROPONIN I - Abnormal    Troponin I 0.101 (*)    COMPREHENSIVE METABOLIC PANEL - Abnormal    Sodium 140      Potassium 3.4 (*)     Chloride 105      CO2 24      Glucose 111 (*)     BUN 26 (*)     Creatinine 6.0 (*)     Calcium 10.0      Total Protein 7.6      Albumin 3.2 (*)     Total Bilirubin 1.1 (*)     Alkaline Phosphatase 67      AST 11      ALT 8 (*)     eGFR 10.9 (*)     Anion Gap 11     LIPID PANEL - Abnormal    Cholesterol 131      Triglycerides 83      HDL 67      LDL Cholesterol 47.4 (*)     HDL/Cholesterol Ratio 51.1 (*)     Total Cholesterol/HDL Ratio 2.0      Non-HDL Cholesterol 64     B-TYPE NATRIURETIC PEPTIDE - Abnormal    BNP 1,956 (*)    TROPONIN I - Abnormal    Troponin I 0.086 (*)    SARS-COV2 (COVID) WITH FLU/RSV BY PCR    SARS-CoV2 (COVID-19) Qualitative PCR Not Detected      Influenza A, Molecular Not Detected      Influenza B, Molecular Not Detected      RSV Ag by Molecular Method Not Detected     TYPE & SCREEN    Group & Rh O POS      Indirect Elliot NEG      Specimen  Outdate 09/05/2024 23:59     POCT GLUCOSE    POCT Glucose 89     POCT GLUCOSE MONITORING CONTINUOUS     EKG Readings: (Independently Interpreted)   Initial Reading: No STEMI. Rhythm: Normal Sinus Rhythm.   T-wave inversions in the lateral leads which are old       Imaging Results              X-Ray Chest AP Portable (Final result)  Result time 09/02/24 20:35:47      Final result by Roderick Rae MD (09/02/24 20:35:47)                   Impression:      1. Pulmonary findings suggest congestive change or edema.  No large focal consolidation.      Electronically signed by: Roderick Rae MD  Date:    09/02/2024  Time:    20:35               Narrative:    EXAMINATION:  XR CHEST AP PORTABLE    CLINICAL HISTORY:  New oxygen requirement;    TECHNIQUE:  Single frontal view of the chest was performed.    COMPARISON:  09/02/2024    FINDINGS:  The cardiomediastinal silhouette is not enlarged, magnified by technique..  There is no pleural effusion.  The trachea is midline.  The lungs are symmetrically expanded bilaterally with patchy increased interstitial and parenchymal attenuation bilaterally, primarily in a perihilar distribution..  No large focal consolidation seen.  There is no pneumothorax.  The osseous structures are remarkable for degenerative change..                                       X-Ray Chest AP Portable (Final result)  Result time 09/02/24 14:23:32      Final result by Jacey Agurire MD (09/02/24 14:23:32)                   Impression:      Findings still compatible with pulmonary volume overload as well as left pleural effusion with likely associated compressive atelectasis.      Electronically signed by: Jacey Aguirre MD  Date:    09/02/2024  Time:    14:23               Narrative:    EXAMINATION:  XR CHEST AP PORTABLE    CLINICAL HISTORY:  Chest Pain;    TECHNIQUE:  Single frontal view of the chest was performed.    COMPARISON:  06/07/2024 and 03/10/2024    FINDINGS:  The lungs are  symmetrically hypoinflated and demonstrate no significant interval change in the mild diffuse alveolar filling process.  There is silhouetting of the left hemidiaphragm and hazy airspace opacification in the left lower lobe.  Stable cardiomegaly.  The mediastinum, osseous and soft tissue structures are stable in appearance.                                       Medications   sodium chloride 0.9% flush 10 mL (has no administration in time range)   melatonin tablet 6 mg (has no administration in time range)   benzonatate capsule 100 mg (100 mg Oral Given 9/2/24 1635)   guaiFENesin 100 mg/5 ml syrup 200 mg (200 mg Oral Given 9/2/24 1940)   sodium chloride 0.9% flush 10 mL (has no administration in time range)   heparin (porcine) injection 5,000 Units (has no administration in time range)   glucose chewable tablet 16 g (has no administration in time range)   glucose chewable tablet 24 g (has no administration in time range)   glucagon (human recombinant) injection 1 mg (has no administration in time range)   insulin aspart U-100 pen 0-5 Units (has no administration in time range)   dextrose 10% bolus 125 mL 125 mL (has no administration in time range)   dextrose 10% bolus 250 mL 250 mL (has no administration in time range)   aspirin EC tablet 81 mg (has no administration in time range)   atorvastatin tablet 80 mg (has no administration in time range)   cinacalcet tablet 90 mg (has no administration in time range)   clopidogreL tablet 75 mg (has no administration in time range)   ezetimibe tablet 10 mg (has no administration in time range)   nitroGLYCERIN SL tablet 0.4 mg (has no administration in time range)   polyethylene glycol packet 17 g (has no administration in time range)   sevelamer carbonate tablet 2,400 mg (has no administration in time range)   carvediloL tablet 50 mg (50 mg Oral Given 9/2/24 1844)   NIFEdipine 24 hr tablet 90 mg (90 mg Oral Given 9/2/24 1940)   albuterol-ipratropium 2.5 mg-0.5 mg/3 mL  nebulizer solution 3 mL (3 mLs Nebulization Given 9/2/24 2029)   aspirin tablet 325 mg (325 mg Oral Given 9/2/24 1227)   hydrALAZINE injection 5 mg (5 mg Intravenous Given by Other 9/2/24 2032)     Medical Decision Making  DDx for chest pain would be broad, including but not limited to serious diagnoses such as ACS, PE, aortic dissection, pericarditis, myocarditis, pneumothorax, pneumonia or pleural effusion, and esophageal rupture.  Other less serious problems such as panic attack, muscle strain, costochrondritis, pleurisy, GERD, and esophageal spasm also considered.      At this time, my greatest suspicion is for ACS  If ACS considered, patient's HEART score is very elevated    ED work-up will include the following:  -EKG: nonischemic  -Labs: CBC, CMP, troponin  -Imaging: CXR  -Cardiac monitoring  -Medications: ASA  -Disposition: admission      Amount and/or Complexity of Data Reviewed  External Data Reviewed: labs, radiology, ECG and notes.  Labs: ordered. Decision-making details documented in ED Course.  Radiology: ordered and independent interpretation performed. Decision-making details documented in ED Course.  ECG/medicine tests: ordered and independent interpretation performed. Decision-making details documented in ED Course.    Risk  OTC drugs.  Prescription drug management.  Decision regarding hospitalization.               ED Course as of 09/02/24 2056   Mon Sep 02, 2024   1241 WBC(!): 14.01 [AS]      ED Course User Index  [AS] Keely Nath MD                           Clinical Impression:  Final diagnoses:  [R07.89] Chest discomfort (Primary)          ED Disposition Condition    Observation Stable                Keely Nath MD  09/02/24 2056

## 2024-09-02 NOTE — ASSESSMENT & PLAN NOTE
Patient with known CAD s/p stent placement, which is controlled Will continue ASA, Plavix, and Statin and monitor for S/Sx of angina/ACS. Continue to monitor on telemetry. s/p [facility] of RCA 2020 with coronary angio in Nov 2023 with three vessel disease (LAD 90%, %, LAD 75%)    Plan:  - consult cardiology, appreciate recs  - management of HTN  - continue DAPT, statin  - PRN nitro

## 2024-09-02 NOTE — ASSESSMENT & PLAN NOTE
Creatine stable for now. BMP reviewed- noted Estimated Creatinine Clearance: 14.4 mL/min (A) (based on SCr of 6 mg/dL (H)). according to latest data. Based on current GFR, CKD stage is end stage.  Monitor UOP and serial BMP and adjust therapy as needed. Renally dose meds. Avoid nephrotoxic medications and procedures.    Plan:  - nephrology consulted, appreciate recs  - HD via R AVF, last dialyzed 9/2/24  - possible fluid overload, may need to draw off fluid with iHD per nephro

## 2024-09-02 NOTE — SUBJECTIVE & OBJECTIVE
Past Medical History:   Diagnosis Date    CAD (coronary artery disease), native coronary artery 11/21/2019    Cataract     Diabetes mellitus     Diabetic retinopathy     Dialysis patient     DM type 2 causing renal disease, not at goal     ESRD (end stage renal disease) started dialysis 01/2014 06/05/2014    History of colon polyps 02/10/2021    History of COVID-19 12/29/2022    Hyperparathyroidism, secondary renal 06/05/2014    Hypertension     NSTEMI (non-ST elevated myocardial infarction) 12/21/2013    Organ transplant candidate 06/05/2014    Pleural effusion 06/07/2024    Pneumonia     Post PTCA 08/26/2020    RCA HILLARY 7-25-20    Renal cell carcinoma of right kidney     Renal hypertension     Stroke        Past Surgical History:   Procedure Laterality Date    ANGIOGRAM, CORONARY, WITH LEFT HEART CATHETERIZATION N/A 7/1/2021    Procedure: Angiogram, Coronary, with Left Heart Cath;  Surgeon: Miki Zendejas MD;  Location: [facility] CATH LAB;  Service: Cardiology;  Laterality: N/A;    ANGIOGRAM, CORONARY, WITH LEFT HEART CATHETERIZATION N/A 11/28/2023    Procedure: Angiogram, Coronary, with Left Heart Cath;  Surgeon: Noah Pendleton MD;  Location: [facility] CATH LAB;  Service: Cardiology;  Laterality: N/A;    COLONOSCOPY N/A 5/3/2017    Procedure: COLONOSCOPY;  Surgeon: Rufino Carpenter MD;  Location: [facility] (4TH FLR);  Service: Endoscopy;  Laterality: N/A;  pt states can only schedule on Wednesdays    COLONOSCOPY N/A 2/9/2021    Procedure: COLONOSCOPY;  Surgeon: Edil Wong MD;  Location: [facility] (4TH FLR);  Service: Endoscopy;  Laterality: N/A;  Dialysis MWF/ labwork day of procedure  right arm aceess  per Dr. Colin pt can hold Plavix 5 days prior see note- sm  COVID test on 2/6/21 at [facility] - sm    COLONOSCOPY N/A 2/10/2021    Procedure: COLONOSCOPY;  Surgeon: Robert Ayers MD;  Location: [facility] (4TH FLR);  Service: Endoscopy;  Laterality: N/A;  rescheduled due to poor bowel prep-BB  negative covid screening  2/6/21-BB  dialysis M-W-F-BB  okay to r/s for 2/9/21 and to hold Plavix per Dr. KIRAN Wong-BB  labs same day-BB    CORONARY STENT PLACEMENT N/A 7/25/2019    Procedure: INSERTION, STENT, CORONARY ARTERY;  Surgeon: Miki Zendejas MD;  Location: [facility] CATH LAB;  Service: Cardiology;  Laterality: N/A;    DIALYSIS FISTULA CREATION      FISTULOGRAM N/A 2/18/2019    Procedure: Fistulogram;  Surgeon: Yaneth De La Cruz MD;  Location: [facility] CATH LAB;  Service: Cardiology;  Laterality: N/A;    FISTULOGRAM Right 7/23/2019    Procedure: Fistulogram;  Surgeon: Oz Cordoba MD;  Location: [facility] CATH LAB;  Service: Cardiology;  Laterality: Right;    LAPAROSCOPIC ROBOT-ASSISTED SURGICAL REMOVAL OF KIDNEY USING DA JAIME XI Right 5/19/2022    Procedure: XI ROBOTIC NEPHRECTOMY;  Surgeon: Aidan Hendricks MD;  [address]  Service: Urology;  Laterality: Right;  3hrs    LAPAROSCOPIC ROBOT-ASSISTED SURGICAL REMOVAL OF KIDNEY USING DA JAIME XI Left 1/5/2023    Procedure: XI ROBOTIC NEPHRECTOMY;  Surgeon: Aidan Hendircks MD;  Location: [facility] OR [facility] FLR;  Service: Urology;  Laterality: Left;  4 hrs    LEFT HEART CATHETERIZATION Left 7/25/2019    Procedure: Left heart cath;  Surgeon: Miki Zendejas MD;  Location: [facility] CATH LAB;  Service: Cardiology;  Laterality: Left;    REPAIR  1/5/2023    Procedure: REPAIR; COLOTOMY;  Surgeon: Maikel Lamb MD;  [facility];  Service: General;;    RETINAL LASER PROCEDURE Bilateral 2018 or 2017    Dr. Rothman    VASCULAR SURGERY         Review of patient's allergies indicates:   Allergen Reactions    No known allergies        Current Facility-Administered Medications on File Prior to Encounter   Medication    lidocaine (PF) 10 mg/ml (1%) injection 10 mg     Current Outpatient Medications on File Prior to Encounter   Medication Sig    aspirin (ECOTRIN) 81 MG EC tablet Take 1 tablet (81 mg total) by mouth once daily.    carvediloL (COREG) 25 MG tablet Take 2  tablets (50 mg total) by mouth 2 (two) times daily with meals.    hydrALAZINE (APRESOLINE) 100 MG tablet Take 1 tablet (100 mg total) by mouth every 12 (twelve) hours.    NIFEdipine (PROCARDIA-XL) 90 MG (OSM) 24 hr tablet Take 1 tablet (90 mg total) by mouth once daily.    nitroGLYCERIN (NITROSTAT) 0.4 MG SL tablet Place 1 tablet (0.4 mg total) under the tongue every 5 (five) minutes as needed for Chest pain.    acetaminophen (TYLENOL) 650 MG TbSR Take 1 tablet (650 mg total) by mouth daily as needed (pain).    amLODIPine (NORVASC) 10 MG tablet Take 10 mg by mouth. (Patient not taking: Reported on 7/23/2024)    atorvastatin (LIPITOR) 80 MG tablet Take 1 tablet (80 mg total) by mouth once daily.    cinacalcet (SENSIPAR) 30 MG Tab Take by mouth.    clopidogreL (PLAVIX) 75 mg tablet Take 1 tablet (75 mg total) by mouth once daily.    epoetin philip (PROCRIT) 4,000 unit/mL injection Inject 1.93 mLs (7,720 Units total) into the skin every Mon, Wed, Fri.    ezetimibe (ZETIA) 10 mg tablet Take 1 tablet (10 mg total) by mouth once daily.    isosorbide mononitrate (IMDUR) 120 MG 24 hr tablet Take 1 tablet (120 mg total) by mouth once daily.    isosorbide mononitrate (IMDUR) 60 MG 24 hr tablet Take 1 tablet by mouth once daily. (Patient not taking: Reported on 7/23/2024)    melatonin (MELATIN) 3 mg tablet Take 1 tablet (3 mg total) by mouth nightly. (Patient not taking: Reported on 7/23/2024)    methoxy peg-epoetin beta (MIRCERA INJ) 150 mcg.    methoxy peg-epoetin beta (MIRCERA INJ) 100 mcg.    methoxy peg-epoetin beta (MIRCERA INJ) 75 mcg.    minoxidiL (LONITEN) 2.5 MG tablet Take 2 tablets (5 mg total) by mouth 2 (two) times daily.    ondansetron HCl (ZOFRAN ORAL) 4 mg.    polyethylene glycol (GLYCOLAX) 17 gram/dose powder Take 17 g by mouth daily as needed for Constipation.    sevelamer carbonate (RENVELA) 800 mg Tab Take 2 tablets (1,600 mg total) by mouth 3 (three) times daily with meals.    VELPHORO 500 mg Chew Take 2  tablets by mouth.    vit B complex-C-folic ac-zinc (RENAPLEX) 800 mcg- 12.5 mg Tab Take 1 tablet by mouth.     Family History       Problem Relation (Age of Onset)    Cancer Maternal Grandfather    Cataracts Mother    Diabetes Mother    Heart disease Brother    Hypertension Mother, Father, Sister, Brother    Kidney disease Brother          Tobacco Use    Smoking status: Never    Smokeless tobacco: Never   Substance and Sexual Activity    Alcohol use: Not Currently     Comment: One drink a month    Drug use: No    Sexual activity: Yes     Partners: Female     Birth control/protection: None     Review of Systems   Constitutional:  Positive for fatigue. Negative for chills, diaphoresis and fever.   HENT:  Negative for rhinorrhea and sore throat.    Eyes:  Negative for photophobia, pain, redness and visual disturbance.   Respiratory:  Positive for cough and shortness of breath.    Cardiovascular:  Positive for chest pain. Negative for palpitations and leg swelling.   Gastrointestinal:  Positive for nausea and vomiting. Negative for abdominal pain and diarrhea.   Genitourinary:         Does not make urine   Musculoskeletal:  Negative for arthralgias and myalgias.   Skin:  Negative for rash and wound.   Neurological:  Negative for dizziness, tremors, weakness, numbness and headaches.   Psychiatric/Behavioral:  Negative for confusion.      Objective:     Vital Signs (Most Recent):  Temp: 99.1 °F (37.3 °C) (09/02/24 1642)  Pulse: 79 (09/02/24 1522)  Resp: 18 (09/02/24 1642)  BP: (!) 168/81 (09/02/24 1522)  SpO2: 96 % (09/02/24 1412) Vital Signs (24h Range):  Temp:  [99.1 °F (37.3 °C)] 99.1 °F (37.3 °C)  Pulse:  [72-79] 79  Resp:  [18-36] 18  SpO2:  [95 %-96 %] 96 %  BP: (143-179)/(80-86) 168/81     Weight: 73 kg (161 lb)  Body mass index is 25.22 kg/m².     Physical Exam  Vitals and nursing note reviewed.   Constitutional:       General: He is not in acute distress.     Appearance: Normal appearance. He is not ill-appearing  "or diaphoretic.   HENT:      Head: Normocephalic and atraumatic.      Right Ear: External ear normal.      Left Ear: External ear normal.      Nose: Nose normal. No rhinorrhea.   Eyes:      General: No scleral icterus.        Right eye: No discharge.         Left eye: No discharge.      Extraocular Movements: Extraocular movements intact.   Cardiovascular:      Rate and Rhythm: Normal rate and regular rhythm.      Pulses: Normal pulses.      Heart sounds: No murmur heard.  Pulmonary:      Effort: Pulmonary effort is normal. No respiratory distress.      Breath sounds: No wheezing.      Comments: Bibasilar crackles  Abdominal:      General: Abdomen is flat. There is no distension.      Palpations: Abdomen is soft.      Tenderness: There is no abdominal tenderness.   Musculoskeletal:      Cervical back: Normal range of motion and neck supple.      Right lower leg: No edema.      Left lower leg: No edema.   Skin:     General: Skin is warm and dry.   Neurological:      Mental Status: He is alert and oriented to person, place, and time.           Significant Labs: All pertinent labs within the past 24 hours have been reviewed.  CBC:   Recent Labs   Lab 09/02/24  1223   WBC 14.01*   HGB 9.9*   HCT 32.3*        CMP:   Recent Labs   Lab 09/02/24  1330      K 3.4*      CO2 24   *   BUN 26*   CREATININE 6.0*   CALCIUM 10.0   PROT 7.6   ALBUMIN 3.2*   BILITOT 1.1*   ALKPHOS 67   AST 11   ALT 8*   ANIONGAP 11     Cardiac Markers: No results for input(s): "CKMB", "MYOGLOBIN", "BNP", "TROPISTAT" in the last 48 hours.  Troponin:   Recent Labs   Lab 09/02/24  1223 09/02/24  1523   TROPONINI 0.095* 0.101*       Significant Imaging: I have reviewed all pertinent imaging results/findings within the past 24 hours.    "

## 2024-09-03 PROBLEM — I25.10 TRIPLE VESSEL CORONARY ARTERY DISEASE: Status: ACTIVE | Noted: 2024-09-03

## 2024-09-03 LAB
ALBUMIN SERPL BCP-MCNC: 3.5 G/DL (ref 3.5–5.2)
ALP SERPL-CCNC: 77 U/L (ref 55–135)
ALT SERPL W/O P-5'-P-CCNC: 10 U/L (ref 10–44)
ANION GAP SERPL CALC-SCNC: 11 MMOL/L (ref 8–16)
ASCENDING AORTA: 3.16 CM
AST SERPL-CCNC: 12 U/L (ref 10–40)
AV INDEX (PROSTH): 0.53
AV MEAN GRADIENT: 12 MMHG
AV PEAK GRADIENT: 20 MMHG
AV VALVE AREA BY VELOCITY RATIO: 1.58 CM²
AV VALVE AREA: 1.69 CM²
AV VELOCITY RATIO: 0.49
BASOPHILS # BLD AUTO: 0.09 K/UL (ref 0–0.2)
BASOPHILS NFR BLD: 0.6 % (ref 0–1.9)
BILIRUB SERPL-MCNC: 1.8 MG/DL (ref 0.1–1)
BSA FOR ECHO PROCEDURE: 1.81 M2
BUN SERPL-MCNC: 17 MG/DL (ref 6–20)
CALCIUM SERPL-MCNC: 10.4 MG/DL (ref 8.7–10.5)
CHLORIDE SERPL-SCNC: 103 MMOL/L (ref 95–110)
CO2 SERPL-SCNC: 23 MMOL/L (ref 23–29)
CREAT SERPL-MCNC: 4.1 MG/DL (ref 0.5–1.4)
CV ECHO LV RWT: 0.49 CM
DIFFERENTIAL METHOD BLD: ABNORMAL
DOP CALC AO PEAK VEL: 2.21 M/S
DOP CALC AO VTI: 46.1 CM
DOP CALC LVOT AREA: 3.2 CM2
DOP CALC LVOT DIAMETER: 2.02 CM
DOP CALC LVOT PEAK VEL: 1.09 M/S
DOP CALC LVOT STROKE VOLUME: 78.09 CM3
DOP CALCLVOT PEAK VEL VTI: 24.38 CM
E WAVE DECELERATION TIME: 153.4 MSEC
E/A RATIO: 2.58
E/E' RATIO: 23.45 M/S
ECHO LV POSTERIOR WALL: 1.38 CM (ref 0.6–1.1)
EOSINOPHIL # BLD AUTO: 0.1 K/UL (ref 0–0.5)
EOSINOPHIL NFR BLD: 0.9 % (ref 0–8)
ERYTHROCYTE [DISTWIDTH] IN BLOOD BY AUTOMATED COUNT: 15.9 % (ref 11.5–14.5)
EST. GFR  (NO RACE VARIABLE): 17.3 ML/MIN/1.73 M^2
FRACTIONAL SHORTENING: 40 % (ref 28–44)
GLOBAL LONGITUIDAL STRAIN: 11 %
GLUCOSE SERPL-MCNC: 82 MG/DL (ref 70–110)
HBV SURFACE AG SERPL QL IA: NORMAL
HCT VFR BLD AUTO: 35 % (ref 40–54)
HGB BLD-MCNC: 11 G/DL (ref 14–18)
IMM GRANULOCYTES # BLD AUTO: 0.08 K/UL (ref 0–0.04)
IMM GRANULOCYTES NFR BLD AUTO: 0.6 % (ref 0–0.5)
INTERVENTRICULAR SEPTUM: 1.07 CM (ref 0.6–1.1)
LA MAJOR: 6.46 CM
LA MINOR: 5.19 CM
LA WIDTH: 5.53 CM
LEFT ATRIUM SIZE: 4.45 CM
LEFT ATRIUM VOLUME INDEX MOD: 53.4 ML/M2
LEFT ATRIUM VOLUME INDEX: 66.9 ML/M2
LEFT ATRIUM VOLUME MOD: 96.08 CM3
LEFT ATRIUM VOLUME: 120.39 CM3
LEFT INTERNAL DIMENSION IN SYSTOLE: 3.38 CM (ref 2.1–4)
LEFT VENTRICLE DIASTOLIC VOLUME INDEX: 85.67 ML/M2
LEFT VENTRICLE DIASTOLIC VOLUME: 154.21 ML
LEFT VENTRICLE MASS INDEX: 161 G/M2
LEFT VENTRICLE SYSTOLIC VOLUME INDEX: 26 ML/M2
LEFT VENTRICLE SYSTOLIC VOLUME: 46.8 ML
LEFT VENTRICULAR INTERNAL DIMENSION IN DIASTOLE: 5.61 CM (ref 3.5–6)
LEFT VENTRICULAR MASS: 289.34 G
LV LATERAL E/E' RATIO: 16.13 M/S
LV SEPTAL E/E' RATIO: 43 M/S
LYMPHOCYTES # BLD AUTO: 1.1 K/UL (ref 1–4.8)
LYMPHOCYTES NFR BLD: 7.6 % (ref 18–48)
MAGNESIUM SERPL-MCNC: 2 MG/DL (ref 1.6–2.6)
MCH RBC QN AUTO: 29.6 PG (ref 27–31)
MCHC RBC AUTO-ENTMCNC: 31.4 G/DL (ref 32–36)
MCV RBC AUTO: 94 FL (ref 82–98)
MONOCYTES # BLD AUTO: 1.1 K/UL (ref 0.3–1)
MONOCYTES NFR BLD: 7.8 % (ref 4–15)
MV A" WAVE DURATION": 3.71 MSEC
MV PEAK A VEL: 0.5 M/S
MV PEAK E VEL: 1.29 M/S
MV STENOSIS PRESSURE HALF TIME: 44.49 MS
MV VALVE AREA P 1/2 METHOD: 4.94 CM2
NEUTROPHILS # BLD AUTO: 11.8 K/UL (ref 1.8–7.7)
NEUTROPHILS NFR BLD: 82.5 % (ref 38–73)
NRBC BLD-RTO: 0 /100 WBC
OHS LV EJECTION FRACTION SIMPSONS BIPLANE MOD: 45 %
OHS QRS DURATION: 104 MS
OHS QRS DURATION: 106 MS
OHS QTC CALCULATION: 437 MS
OHS QTC CALCULATION: 439 MS
OHS QTC CALCULATION: 440 MS
OHS QTC CALCULATION: 445 MS
PHOSPHATE SERPL-MCNC: 2.1 MG/DL (ref 2.7–4.5)
PISA MRMAX VEL: 0.05 M/S
PISA TR MAX VEL: 2.2 M/S
PLATELET # BLD AUTO: 326 K/UL (ref 150–450)
PMV BLD AUTO: 10.4 FL (ref 9.2–12.9)
POCT GLUCOSE: 118 MG/DL (ref 70–110)
POCT GLUCOSE: 120 MG/DL (ref 70–110)
POCT GLUCOSE: 90 MG/DL (ref 70–110)
POCT GLUCOSE: 92 MG/DL (ref 70–110)
POTASSIUM SERPL-SCNC: 3.6 MMOL/L (ref 3.5–5.1)
PROT SERPL-MCNC: 9 G/DL (ref 6–8.4)
PULM VEIN S/D RATIO: 0.47
PV PEAK D VEL: 0.59 M/S
PV PEAK S VEL: 0.28 M/S
RA MAJOR: 4.82 CM
RA PRESSURE ESTIMATED: 3 MMHG
RA WIDTH: 3.78 CM
RBC # BLD AUTO: 3.71 M/UL (ref 4.6–6.2)
RIGHT VENTRICLE DIASTOLIC BASEL DIMENSION: 3.3 CM
RV TB RVSP: 5 MMHG
RV TISSUE DOPPLER FREE WALL SYSTOLIC VELOCITY 1 (APICAL 4 CHAMBER VIEW): 13.57 CM/S
SINUS: 3.57 CM
SODIUM SERPL-SCNC: 137 MMOL/L (ref 136–145)
STJ: 2.71 CM
TDI LATERAL: 0.08 M/S
TDI SEPTAL: 0.03 M/S
TDI: 0.06 M/S
TR MAX PG: 19 MMHG
TRICUSPID ANNULAR PLANE SYSTOLIC EXCURSION: 2.74 CM
TROPONIN I SERPL DL<=0.01 NG/ML-MCNC: 0.13 NG/ML (ref 0–0.03)
TROPONIN I SERPL DL<=0.01 NG/ML-MCNC: 0.15 NG/ML (ref 0–0.03)
TV REST PULMONARY ARTERY PRESSURE: 22 MMHG
WBC # BLD AUTO: 14.26 K/UL (ref 3.9–12.7)
Z-SCORE OF LEFT VENTRICULAR DIMENSION IN END DIASTOLE: 1.2
Z-SCORE OF LEFT VENTRICULAR DIMENSION IN END SYSTOLE: 0.75

## 2024-09-03 PROCEDURE — 84100 ASSAY OF PHOSPHORUS: CPT

## 2024-09-03 PROCEDURE — 87340 HEPATITIS B SURFACE AG IA: CPT

## 2024-09-03 PROCEDURE — 25000003 PHARM REV CODE 250

## 2024-09-03 PROCEDURE — 96375 TX/PRO/DX INJ NEW DRUG ADDON: CPT

## 2024-09-03 PROCEDURE — 93010 ELECTROCARDIOGRAM REPORT: CPT | Mod: ,,, | Performed by: INTERNAL MEDICINE

## 2024-09-03 PROCEDURE — 94799 UNLISTED PULMONARY SVC/PX: CPT

## 2024-09-03 PROCEDURE — 27100171 HC OXYGEN HIGH FLOW UP TO 24 HOURS

## 2024-09-03 PROCEDURE — 84484 ASSAY OF TROPONIN QUANT: CPT

## 2024-09-03 PROCEDURE — 84484 ASSAY OF TROPONIN QUANT: CPT | Mod: 91

## 2024-09-03 PROCEDURE — 85025 COMPLETE CBC W/AUTO DIFF WBC: CPT

## 2024-09-03 PROCEDURE — 83735 ASSAY OF MAGNESIUM: CPT

## 2024-09-03 PROCEDURE — 99900035 HC TECH TIME PER 15 MIN (STAT)

## 2024-09-03 PROCEDURE — 80053 COMPREHEN METABOLIC PANEL: CPT

## 2024-09-03 PROCEDURE — 5A1D70Z PERFORMANCE OF URINARY FILTRATION, INTERMITTENT, LESS THAN 6 HOURS PER DAY: ICD-10-PCS | Performed by: HOSPITALIST

## 2024-09-03 PROCEDURE — 25000003 PHARM REV CODE 250: Performed by: STUDENT IN AN ORGANIZED HEALTH CARE EDUCATION/TRAINING PROGRAM

## 2024-09-03 PROCEDURE — 96372 THER/PROPH/DIAG INJ SC/IM: CPT

## 2024-09-03 PROCEDURE — 94640 AIRWAY INHALATION TREATMENT: CPT | Mod: XB

## 2024-09-03 PROCEDURE — 63600175 PHARM REV CODE 636 W HCPCS: Performed by: STUDENT IN AN ORGANIZED HEALTH CARE EDUCATION/TRAINING PROGRAM

## 2024-09-03 PROCEDURE — 93005 ELECTROCARDIOGRAM TRACING: CPT

## 2024-09-03 PROCEDURE — 99223 1ST HOSP IP/OBS HIGH 75: CPT | Mod: AI,GC,, | Performed by: INTERNAL MEDICINE

## 2024-09-03 PROCEDURE — G0257 UNSCHED DIALYSIS ESRD PT HOS: HCPCS

## 2024-09-03 PROCEDURE — 11000001 HC ACUTE MED/SURG PRIVATE ROOM

## 2024-09-03 PROCEDURE — 63600175 PHARM REV CODE 636 W HCPCS

## 2024-09-03 PROCEDURE — 94761 N-INVAS EAR/PLS OXIMETRY MLT: CPT

## 2024-09-03 PROCEDURE — 63600175 PHARM REV CODE 636 W HCPCS: Mod: JZ,JG

## 2024-09-03 PROCEDURE — 25000242 PHARM REV CODE 250 ALT 637 W/ HCPCS

## 2024-09-03 PROCEDURE — 96372 THER/PROPH/DIAG INJ SC/IM: CPT | Performed by: STUDENT IN AN ORGANIZED HEALTH CARE EDUCATION/TRAINING PROGRAM

## 2024-09-03 PROCEDURE — 99232 SBSQ HOSP IP/OBS MODERATE 35: CPT | Mod: ,,, | Performed by: INTERNAL MEDICINE

## 2024-09-03 RX ORDER — LABETALOL HCL 20 MG/4 ML
10 SYRINGE (ML) INTRAVENOUS DAILY PRN
Status: COMPLETED | OUTPATIENT
Start: 2024-09-03 | End: 2024-09-03

## 2024-09-03 RX ORDER — TENAPANOR 21.3 MG/1
20 TABLET, FILM COATED ORAL 2 TIMES DAILY
COMMUNITY
Start: 2024-08-26

## 2024-09-03 RX ORDER — CINACALCET HYDROCHLORIDE 60 MG/1
60 TABLET, COATED ORAL
COMMUNITY
Start: 2024-08-25

## 2024-09-03 RX ORDER — HYDRALAZINE HYDROCHLORIDE 50 MG/1
100 TABLET, FILM COATED ORAL EVERY 8 HOURS
Status: DISCONTINUED | OUTPATIENT
Start: 2024-09-03 | End: 2024-09-04 | Stop reason: HOSPADM

## 2024-09-03 RX ORDER — ACETAMINOPHEN 325 MG/1
650 TABLET ORAL EVERY 6 HOURS PRN
Status: DISCONTINUED | OUTPATIENT
Start: 2024-09-03 | End: 2024-09-04 | Stop reason: HOSPADM

## 2024-09-03 RX ORDER — ONDANSETRON 4 MG/1
4 TABLET, FILM COATED ORAL EVERY 6 HOURS PRN
Status: DISCONTINUED | OUTPATIENT
Start: 2024-09-03 | End: 2024-09-04 | Stop reason: HOSPADM

## 2024-09-03 RX ORDER — ISOSORBIDE MONONITRATE 60 MG/1
120 TABLET, EXTENDED RELEASE ORAL DAILY
Status: DISCONTINUED | OUTPATIENT
Start: 2024-09-03 | End: 2024-09-04 | Stop reason: HOSPADM

## 2024-09-03 RX ORDER — HEPARIN SODIUM 5000 [USP'U]/ML
5000 INJECTION, SOLUTION INTRAVENOUS; SUBCUTANEOUS EVERY 8 HOURS
Status: DISCONTINUED | OUTPATIENT
Start: 2024-09-03 | End: 2024-09-04 | Stop reason: HOSPADM

## 2024-09-03 RX ADMIN — IPRATROPIUM BROMIDE AND ALBUTEROL SULFATE 3 ML: 2.5; .5 SOLUTION RESPIRATORY (INHALATION) at 08:09

## 2024-09-03 RX ADMIN — CARVEDILOL 50 MG: 25 TABLET, FILM COATED ORAL at 05:09

## 2024-09-03 RX ADMIN — EZETIMIBE 10 MG: 10 TABLET ORAL at 08:09

## 2024-09-03 RX ADMIN — ASPIRIN 81 MG: 81 TABLET, COATED ORAL at 08:09

## 2024-09-03 RX ADMIN — ATORVASTATIN CALCIUM 80 MG: 40 TABLET, FILM COATED ORAL at 09:09

## 2024-09-03 RX ADMIN — HEPARIN SODIUM 5000 UNITS: 5000 INJECTION INTRAVENOUS; SUBCUTANEOUS at 09:09

## 2024-09-03 RX ADMIN — IPRATROPIUM BROMIDE AND ALBUTEROL SULFATE 3 ML: 2.5; .5 SOLUTION RESPIRATORY (INHALATION) at 03:09

## 2024-09-03 RX ADMIN — ONDANSETRON HYDROCHLORIDE 4 MG: 4 TABLET, FILM COATED ORAL at 01:09

## 2024-09-03 RX ADMIN — IPRATROPIUM BROMIDE AND ALBUTEROL SULFATE 3 ML: 2.5; .5 SOLUTION RESPIRATORY (INHALATION) at 07:09

## 2024-09-03 RX ADMIN — IPRATROPIUM BROMIDE AND ALBUTEROL SULFATE 3 ML: 2.5; .5 SOLUTION RESPIRATORY (INHALATION) at 12:09

## 2024-09-03 RX ADMIN — IPRATROPIUM BROMIDE AND ALBUTEROL SULFATE 3 ML: 2.5; .5 SOLUTION RESPIRATORY (INHALATION) at 11:09

## 2024-09-03 RX ADMIN — CARVEDILOL 50 MG: 25 TABLET, FILM COATED ORAL at 08:09

## 2024-09-03 RX ADMIN — ACETAMINOPHEN 650 MG: 325 TABLET ORAL at 01:09

## 2024-09-03 RX ADMIN — ISOSORBIDE MONONITRATE 120 MG: 60 TABLET, EXTENDED RELEASE ORAL at 11:09

## 2024-09-03 RX ADMIN — HYDRALAZINE HYDROCHLORIDE 100 MG: 50 TABLET ORAL at 01:09

## 2024-09-03 RX ADMIN — CINACALCET 90 MG: 30 TABLET, FILM COATED ORAL at 08:09

## 2024-09-03 RX ADMIN — Medication 10 MG: at 01:09

## 2024-09-03 RX ADMIN — IPRATROPIUM BROMIDE AND ALBUTEROL SULFATE 3 ML: 2.5; .5 SOLUTION RESPIRATORY (INHALATION) at 04:09

## 2024-09-03 RX ADMIN — NIFEDIPINE 90 MG: 60 TABLET, FILM COATED, EXTENDED RELEASE ORAL at 08:09

## 2024-09-03 RX ADMIN — CLOPIDOGREL BISULFATE 75 MG: 75 TABLET ORAL at 08:09

## 2024-09-03 RX ADMIN — HEPARIN SODIUM 5000 UNITS: 5000 INJECTION INTRAVENOUS; SUBCUTANEOUS at 02:09

## 2024-09-03 RX ADMIN — HYDRALAZINE HYDROCHLORIDE 100 MG: 50 TABLET ORAL at 09:09

## 2024-09-03 RX ADMIN — POLYETHYLENE GLYCOL 3350 17 G: 17 POWDER, FOR SOLUTION ORAL at 09:09

## 2024-09-03 NOTE — CONSULTS
Patient seen and evaluated by critical care medicine. To be admitted to ICU for further management. Full H&P to follow.     NANI Ozuna-BC  Pulmonary Critical Care  09/03/2024

## 2024-09-03 NOTE — ASSESSMENT & PLAN NOTE
48-year-old male with known HTN, CAD, T2DM, and ESRD on HD now admitted to MICU with acute hypoxic respiratory failure and NSTEMI in setting of ESRD fluid overload.    #Resp  Acute Hypoxemic Respiratory Failure  Admit CXR with diffuse pulmonary oedema  BNP 1900  Started on HFNC in ED     Patient is not on home oxygen. Supplemental oxygen was provided and noted- Oxygen Concentration (%):  [80] 80    Signs/symptoms of respiratory failure include- increased work of breathing. Contributing diagnoses includes - ESRD associated fluid overload Labs and images were reviewed. Patient Has recent ABG, which has been reviewed. Will treat underlying causes and adjust management of respiratory failure as follows- HFNC    ABG  Recent Labs   Lab 09/02/24 2031   PH 7.473*   PO2 79*   PCO2 37.1   HCO3 27.2   BE 4*        Plan   - Off HFNC and currently using NC   - Nep consult for HD. Dialyzed last night and plans for dialysis tomorrow (9/4)    #CV  NSTEMI (non-ST elevated myocardial infarction)  Central chest pain radiating to L arm partially relieved by nitroglycerin, troponin 0.095 -> 0.101 in ED. Repeat 0.086. BNP 1.9k. CXR suggestive of pulmonary edema, L pleural effusion.      Plan  - consult cardiology, appreciate recs. Recommend treating hypertension and following up troponin for now  - nephrology consulted for HD, likely need to fluid removal  - trend troponin  - EKG if chest pain  - manage hypertension         - continue home coreg, nifedipine         - continue hydralazine, imdur  - covid neg. F/u echo        Coronary artery disease of native artery of native heart with stable angina pectoris  Patient with known CAD s/p stent placement, which is controlled Will continue ASA, Plavix, and Statin and monitor for S/Sx of angina/ACS. Continue to monitor on telemetry. s/p HILLARY of RCA 2020 with coronary angio in Nov 2023 with three vessel disease (LAD 90%, %, LAD 75%)     Plan  - consult cardiology, appreciate recs  -  management of HTN  - continue DAPT, statin  - PRN nitro        Essential hypertension  Chronic, uncontrolled. Latest blood pressure and vitals reviewed-     While in the hospital, will manage blood pressure as follows; Adjust home antihypertensive regimen as follows-              - home nifedipine, carvedilol, hydralazine     Will utilize p.r.n. blood pressure medication only if patient's blood pressure greater than 180/110 and he develops symptoms such as worsening chest pain or shortness of breath.       #Neuro  No acute concerns    #FEN/GI  Type 2 diabetes mellitus with chronic kidney disease on chronic dialysis, without long-term current use of insulin  Creatine stable for now. BMP reviewed- noted Estimated Creatinine Clearance: 14.4 mL/min (A) (based on SCr of 6 mg/dL (H)). according to latest data. Based on current GFR, CKD stage is end stage.  Monitor UOP and serial BMP and adjust therapy as needed. Renally dose meds. Avoid nephrotoxic medications and procedures.     A1c July 2024 4.3%     Plan  - low dose SS insulin       Hyperparathyroidism, secondary renal  Continue home cinacalcet    #Renal  ESRD on hemodialysis  Creatine stable for now. BMP reviewed- noted Estimated Creatinine Clearance: 14.4 mL/min (A) (based on SCr of 6 mg/dL (H)). according to latest data. Based on current GFR, CKD stage is end stage.  Monitor UOP and serial BMP and adjust therapy as needed. Renally dose meds. Avoid nephrotoxic medications and procedures.     Plan  - nephrology consulted, appreciate recs  - HD via R AVF, last dialyzed 9/2/24  - possible fluid overload, may need to draw off fluid with iHD per nephro  - Nephrology plans for dialysis 9/4    #Heme/ID  Anemia in end-stage renal disease  Anemia is likely due to chronic disease due to ESRD. Most recent hemoglobin 9     Plan  - Monitor serial CBC: Daily  - Transfuse PRBC if patient becomes hemodynamically unstable, symptomatic or H/H drops below 7/21.  - Patient has not  received any PRBC transfusions to date  - Patient's anemia is currently stable       Code: Full  Access: PIV  Social: No concerns  Dispo: Stepdown

## 2024-09-03 NOTE — CONSULTS
[facility]  Cardiology  Consult Note    Patient Name: Haroldo Dickinson  MRN: [id number]  Admission Date: 9/2/2024  Hospital Length of Stay: 0 days  Code Status: Full Code   Attending Provider: Denver Manuel*   Consulting Provider: Isra Perrin MD  Primary Care Physician: Mireya aLrose MD  Principal Problem:Acute hypoxemic respiratory failure    Patient information was obtained from patient and ER records.     Consults  Subjective:     HPI:   48-year-old male with a past medical history of HTN compilcated by CAD (s/p HILLARY to RCA in 2020), HFpEF, ESRD on dialysis (MWF), previous RCC (s/p bilateral nephrectomies), T2DM, and hx of CVA (w/ residual R-sided deficits) who [place] [facility] with chest pain. Patient reported a tingling sensation in his left upper chest and left arm which is worse while lying flat and improves with sitting forward. Additionally patient reported associated difficulty breathing and cough. Patient last completed dialysis on the morning prior to arriving in the ED. Patient endorsed that he experiences these symptoms when he is fluid overloaded and requires dialysis.     In the emergency department, /83, HR 72, and sPO2 95% on HFNC. Workup consistent with leukocytosis (WBC 14), CMP consistent with ESRD and stable electrolytes, BNP 1900 (increased from baseline), and trop #1 0.095 and trop #2 0.1(though improved from baseline 12). EKG NSR w/o ST depressions or elevations. CXR with significant pulmonary oedema. Patient evaluated by nephrology and recommended to be admitted for dialysis. Patient initially admitted to  for HD. Prior to leaving ED for floor patient had noted /104 and MICU consulted for nitro drip. Patient transferred to MICU and nitro not initiated as patient requires HD.      Interval update: NAEON. Patient had chest pain, shortness of breath associated with lying flat, symptoms he correlates with times when he had been  overloaded in the past.  Patient was dialyzed yesterday(9/2) and his symptoms have resolved. He has no chest pain, SOB, or palpitations.With patient being dialyzed and managed under Nephrology, and current symptoms for which Cards was consulted resolved in a backdrop of HPpEF(Echo today- 9/3, shows EF of 50-55%), cardiac consult team will sign off today for subsequent primary team and Nephrology continued management.            Past Medical History:   Diagnosis Date    CAD (coronary artery disease), native coronary artery 11/21/2019    Cataract     Diabetes mellitus     Diabetic retinopathy     Dialysis patient     DM type 2 causing renal disease, not at goal     ESRD (end stage renal disease) started dialysis 01/2014 06/05/2014    History of colon polyps 02/10/2021    History of COVID-19 12/29/2022    Hyperparathyroidism, secondary renal 06/05/2014    Hypertension     NSTEMI (non-ST elevated myocardial infarction) 12/21/2013    Organ transplant candidate 06/05/2014    Pleural effusion 06/07/2024    Pneumonia     Post PTCA 08/26/2020    RCA HILLARY 7-25-20    Renal cell carcinoma of right kidney     Renal hypertension     Stroke        Past Surgical History:   Procedure Laterality Date    ANGIOGRAM, CORONARY, WITH LEFT HEART CATHETERIZATION N/A 7/1/2021    Procedure: Angiogram, Coronary, with Left Heart Cath;  Surgeon: Miki Zendejas MD;  Location: [facility] CATH LAB;  Service: Cardiology;  Laterality: N/A;    ANGIOGRAM, CORONARY, WITH LEFT HEART CATHETERIZATION N/A 11/28/2023    Procedure: Angiogram, Coronary, with Left Heart Cath;  Surgeon: Noah Pendleton MD;  Location: [facility] CATH LAB;  Service: Cardiology;  Laterality: N/A;    COLONOSCOPY N/A 5/3/2017    Procedure: COLONOSCOPY;  Surgeon: Rufino Carpenter MD;  Location: [facility] ENDO ([address] [place] [address]);  Service: Endoscopy;  Laterality: N/A;   states can only schedule on Wednesdays    COLONOSCOPY N/A 2/9/2021    Procedure: COLONOSCOPY;  Surgeon: Edil Wong MD;  Location: [facility]  ENDO (4TH FLR);  Service: Endoscopy;  Laterality: N/A;  Dialysis MWF/ labwork day of procedure  right arm aceess  per Dr. Colin pt can hold Plavix 5 days prior see note- sm  COVID test on 2/6/21 at [facility] -     COLONOSCOPY N/A 2/10/2021    Procedure: COLONOSCOPY;  Surgeon: Robert Ayers MD;  Location: [facility] ENDO (4TH FLR);  Service: Endoscopy;  Laterality: N/A;  rescheduled due to poor bowel prep-BB  negative covid screening 2/6/21-BB  dialysis M-W-F-BB  okay to r/s for 2/9/21 and to hold Plavix per Dr. KIRAN Wong-BB  labs same day-BB    CORONARY STENT PLACEMENT N/A 7/25/2019    Procedure: INSERTION, STENT, CORONARY ARTERY;  Surgeon: Miki Zendejas MD;  Location: [facility] CATH LAB;  Service: Cardiology;  Laterality: N/A;    DIALYSIS FISTULA CREATION      FISTULOGRAM N/A 2/18/2019    Procedure: Fistulogram;  Surgeon: Yaneth De La Cruz MD;  Location: [facility] CATH LAB;  Service: Cardiology;  Laterality: N/A;    FISTULOGRAM Right 7/23/2019    Procedure: Fistulogram;  Surgeon: Oz Cordoba MD;  Location: [facility] CATH LAB;  Service: Cardiology;  Laterality: Right;    LAPAROSCOPIC ROBOT-ASSISTED SURGICAL REMOVAL OF KIDNEY USING DA JAIME XI Right 5/19/2022    Procedure: XI ROBOTIC NEPHRECTOMY;  Surgeon: Aidan Hendricks MD;  Location: [address];  Service: Urology;  Laterality: Right;  3hrs    LAPAROSCOPIC ROBOT-ASSISTED SURGICAL REMOVAL OF KIDNEY USING DA JAIME XI Left 1/5/2023    Procedure: XI ROBOTIC NEPHRECTOMY;  Surgeon: Aidan Hendricks MD;  Location: [address];  Service: Urology;  Laterality: Left;  4 hrs    LEFT HEART CATHETERIZATION Left 7/25/2019    Procedure: Left heart cath;  Surgeon: Miki Zendejas MD;  Location: [facility] CATH LAB;  Service: Cardiology;  Laterality: Left;    REPAIR  1/5/2023    Procedure: REPAIR; COLOTOMY;  Surgeon: Maikel Lamb MD;  Location: [address];  Service: General;;    RETINAL LASER PROCEDURE Bilateral 2018 or 2017    Dr. Rothman    VASCULAR SURGERY          Review of patient's allergies indicates:   Allergen Reactions    No known allergies        Current Facility-Administered Medications on File Prior to Encounter   Medication    lidocaine (PF) 10 mg/ml (1%) injection 10 mg     Current Outpatient Medications on File Prior to Encounter   Medication Sig    aspirin (ECOTRIN) 81 MG EC tablet Take 1 tablet (81 mg total) by mouth once daily.    carvediloL (COREG) 25 MG tablet Take 2 tablets (50 mg total) by mouth 2 (two) times daily with meals.    hydrALAZINE (APRESOLINE) 100 MG tablet Take 1 tablet (100 mg total) by mouth every 12 (twelve) hours. (Patient taking differently: Take 100 mg by mouth every 8 (eight) hours.)    NIFEdipine (PROCARDIA-XL) 90 MG (OSM) 24 hr tablet Take 1 tablet (90 mg total) by mouth once daily.    nitroGLYCERIN (NITROSTAT) 0.4 MG SL tablet Place 1 tablet (0.4 mg total) under the tongue every 5 (five) minutes as needed for Chest pain.    SENSIPAR 60 mg Tab Take 60 mg by mouth daily with breakfast.    XPHOZAH 20 mg Tab Take 20 mg by mouth 2 (two) times a day.    acetaminophen (TYLENOL) 650 MG TbSR Take 1 tablet (650 mg total) by mouth daily as needed (pain).    atorvastatin (LIPITOR) 80 MG tablet Take 1 tablet (80 mg total) by mouth once daily.    clopidogreL (PLAVIX) 75 mg tablet Take 1 tablet (75 mg total) by mouth once daily.    epoetin philip (PROCRIT) 4,000 unit/mL injection Inject 1.93 mLs (7,720 Units total) into the skin every Mon, Wed, Fri.    ezetimibe (ZETIA) 10 mg tablet Take 1 tablet (10 mg total) by mouth once daily.    isosorbide mononitrate (IMDUR) 120 MG 24 hr tablet Take 1 tablet (120 mg total) by mouth once daily.    methoxy peg-epoetin beta (MIRCERA INJ) 150 mcg.    methoxy peg-epoetin beta (MIRCERA INJ) 100 mcg.    methoxy peg-epoetin beta (MIRCERA INJ) 75 mcg.    minoxidiL (LONITEN) 2.5 MG tablet Take 2 tablets (5 mg total) by mouth 2 (two) times daily.    polyethylene glycol (GLYCOLAX) 17 gram/dose powder Take 17 g by mouth  daily as needed for Constipation.    sevelamer carbonate (RENVELA) 800 mg Tab Take 2 tablets (1,600 mg total) by mouth 3 (three) times daily with meals. (Patient taking differently: Take 800 mg by mouth 3 (three) times daily with meals.)    vit B complex-C-folic ac-zinc (RENAPLEX) 800 mcg- 12.5 mg Tab Take 1 tablet by mouth.    [DISCONTINUED] amLODIPine (NORVASC) 10 MG tablet Take 10 mg by mouth. (Patient not taking: Reported on 7/23/2024)    [DISCONTINUED] cinacalcet (SENSIPAR) 30 MG Tab Take by mouth.    [DISCONTINUED] isosorbide mononitrate (IMDUR) 60 MG 24 hr tablet Take 1 tablet by mouth once daily. (Patient not taking: Reported on 7/23/2024)    [DISCONTINUED] melatonin (MELATIN) 3 mg tablet Take 1 tablet (3 mg total) by mouth nightly. (Patient not taking: Reported on 7/23/2024)    [DISCONTINUED] ondansetron HCl (ZOFRAN ORAL) 4 mg.    [DISCONTINUED] VELPHORO 500 mg Chew Take 2 tablets by mouth.     Family History       Problem Relation (Age of Onset)    Cancer Maternal Grandfather    Cataracts Mother    Diabetes Mother    Heart disease Brother    Hypertension Mother, Father, Sister, Brother    Kidney disease Brother          Tobacco Use    Smoking status: Never    Smokeless tobacco: Never   Substance and Sexual Activity    Alcohol use: Not Currently     Comment: One drink a month    Drug use: No    Sexual activity: Yes     Partners: Female     Birth control/protection: None     Review of Systems   Constitutional: Negative.   Cardiovascular:  Positive for dyspnea on exertion (this has become less severe since after dialysis and he hasn't ambulated). Negative for chest pain, irregular heartbeat, orthopnea and palpitations.   Respiratory: Negative.  Negative for cough.    Musculoskeletal: Negative.    Neurological: Negative.                Objective:     Vital Signs (Most Recent):  Temp: 98.2 °F (36.8 °C) (09/03/24 0415)  Pulse: 63 (09/03/24 1141)  Resp: 20 (09/03/24 1141)  BP: (!) 143/69 (09/03/24 1341)  SpO2: 98  % (09/03/24 1141) Vital Signs (24h Range):  Temp:  [98.2 °F (36.8 °C)-100.4 °F (38 °C)] 98.2 °F (36.8 °C)  Pulse:  [63-86] 63  Resp:  [5-36] 20  SpO2:  [87 %-100 %] 98 %  BP: (143-208)/() 143/69     Weight: 69.4 kg (153 lb)  Body mass index is 23.96 kg/m².    SpO2: 98 %         Intake/Output Summary (Last 24 hours) at 9/3/2024 1401  Last data filed at 9/3/2024 0900  Gross per 24 hour   Intake 220 ml   Output 3500 ml   Net -3280 ml       Lines/Drains/Airways       Peripheral Intravenous Line  Duration                  Hemodialysis AV Fistula Right forearm -- days         Peripheral IV - Single Lumen 09/02/24 1223 20 G Anterior;Distal;Left Upper Arm 1 day                     Physical Exam  Constitutional:       Appearance: Normal appearance. He is normal weight. He is ill-appearing.   HENT:      Head: Normocephalic and atraumatic.   Cardiovascular:      Rate and Rhythm: Normal rate.      Pulses: Normal pulses.      Heart sounds: Normal heart sounds.   Pulmonary:      Breath sounds: Rales (crackles heard at the lung bases) present.   Abdominal:      General: There is soft and full abdomen.   Skin:     Coloration: Skin is not jaundiced or pale.   Neurological:      General: No focal deficit present.      Mental Status: He is alert and oriented to person, place, and time.   Psychiatric:         Mood and Affect: Mood normal.         Behavior: Behavior normal.          Significant Labs:   Recent Labs   Lab 08/28/24  0000 09/02/24  1223 09/02/24 2031 09/03/24  0407   WBC  --  14.01*  --  14.26*   HGB 9.2* 9.9*  --  11.0*   HCT  --  32.3* 33* 35.0*   PLT  --  300  --  326       Recent Labs   Lab 09/02/24  1330 09/02/24 2028 09/03/24  0407    139 137   K 3.4* 4.1 3.6    104 103   CO2 24 23 23   BUN 26* 28* 17   CREATININE 6.0* 6.9* 4.1*   CALCIUM 10.0 9.9 10.4   PHOS  --   --  2.1*       Recent Labs   Lab 09/02/24  1330 09/02/24 2028 09/03/24  0407   ALKPHOS 67 67 77   BILITOT 1.1* 1.3* 1.8*   PROT 7.6  7.5 9.0*   ALT 8* 8* 10   AST 11 11 12       Recent Labs   Lab 09/02/24  1748   CHOL 131   TRIG 83   HDL 67     Lab Results   Component Value Date    CHOL 131 09/02/2024    HDL 67 09/02/2024    LDLCALC 47.4 (L) 09/02/2024    TRIG 83 09/02/2024       Recent Labs   Lab 09/02/24  2028 09/03/24  0107 09/03/24  0913   TROPONINI 0.108*  0.108* 0.133* 0.149*       Lab Results   Component Value Date    HGBA1C 4.3 (L) 07/10/2024    HGBA1C 4.7 03/11/2024       Lab Results   Component Value Date    BNP 1,956 (H) 09/02/2024    BNP 1,542 (H) 06/07/2024     (H) 03/10/2024         Significant Imaging: CXR 9/2/24  Pulmonary findings suggest congestive change or edema. No large focal consolidation.     Assessment and Plan:     Triple vessel coronary artery disease  Patient with known CAD; triple vessel disease. Presented to the ED with c/o chest pain, left sided radiates to the proximal left arm, worse with exertion, present at rest and relieved with Nitro.   - ECHO: 6/7/24     Left Ventricle: The left ventricle is mildly dilated. Moderately increased ventricular mass. Mildly increased wall thickness. There is moderate concentric hypertrophy. Regional wall motion abnormalities present. The following segments are hypokinetic: basal inferior, mid inferoseptal, mid inferolateral, apical septal, apical inferior and apex.. There is normal diastolic function.    Right Ventricle: Normal right ventricular cavity size. Wall thickness is normal. Systolic function is normal.    Left Atrium: Left atrium is severely dilated.    Right Atrium: Normal and compressed right atrial size.    Aortic Valve: The aortic valve is a trileaflet valve. There is moderate aortic valve sclerosis. There is moderate annular calcification present.    Mitral Valve: There is moderate mitral annular calcification present. There is mild regurgitation.    Tricuspid Valve: There is mild regurgitation.    Aorta: Aortic root is normal in size measuring 3.68 cm.  Ascending aorta is mildly dilated measuring 3.89 cm.    Pulmonary Artery: The estimated pulmonary artery systolic pressure is 25 mmHg.    IVC/SVC: Elevated venous pressure at 15 mmHg.    Pericardium: There is a trivial circumferential effusion.    Cardiac Cath 11/28/23     There was three vessel coronary artery disease.    The Mid LAD lesion was 90% stenosed.    The Mid RCA lesion was 100% stenosed.    The Prox LAD lesion was 75% stenosed.    The pre-procedure left ventricular end diastolic pressure was 26.    There was diastolic dysfunction.    Interval worsening of mid LAD disease. Given diffuse nature of CAD will recommend medcial therapy and HD optimization to improve left sided filling pressures    The estimated blood loss was <50 mL.    Right Coronary Artery  Mid RCA lesion was 100% stenosed. The lesion was previously treated using a drug-eluting stent and angioplasty.    Acute Marginal Branch  Collaterals  Acute Mrg filled by collaterals from Mid RCA.      Right Posterior Atrioventricular Artery  Collaterals  RPAV filled by collaterals from 1st Sept.      Recommendations at the time was medical management. With [facility] medical management. Risk factor reductions. ASA 81mg and Plavix for one year. Cardiac rehab referral and Statin therapy.  - EKG: No ischemic changes   - Elevated Trop likely T2MI in the setting of Hypertensive Emergency with an underling diffuse CAD     Plan   - Continue with DAPT. ASA 81 and plavix 75mg qd  - Continue with Atorvastatin 80 mg HS   - Consider resuming Isosorbide mononitrate 120 mg qd   - Continue with coreg and nifedipine   - Continue with nitro sub-lingual   - Pt will benefit from Gen Cardiology follow up upon discharge for symptomatic treatment and closer monitoring   - BP goal 130/80 LDL 70  - Will continue to monitor      9/3/24:  Patient symptoms drastically improved following Dialysis. He was dialyzed on 9/2.     Echo report (9/3/2024)    Left Ventricle: The left  "ventricle is normal in size. Moderately increased ventricular mass. Moderately increased wall thickness. There is moderate concentric hypertrophy. Regional wall motion abnormalities present. Septal motion is abnormal. There is low normal systolic function with a visually estimated ejection fraction of 50 - 55%. Biplane (2D) method of discs ejection fraction is 45%. Global longitudinal strain is -11.0%. Grade II diastolic dysfunction.    Right Ventricle: Normal right ventricular cavity size. There is hypertrophy. Systolic function is normal.    Left Atrium: Left atrium is severely dilated.    Aortic Valve: There is moderate aortic valve sclerosis. There is mild annular calcification present. There is mild stenosis. Aortic valve area by VTI is 1.69 cm². Aortic valve peak velocity is 2.21 m/s. Mean gradient is 12 mmHg. The dimensionless index is 0.53.    Mitral Valve: There is bileaflet sclerosis. There is mild regurgitation.    Pulmonary Artery: The estimated pulmonary artery systolic pressure is 22 mmHg.    IVC/SVC: Normal venous pressure at 3 mmHg.    Pericardium: There is a trivial circumferential effusion.     Vitals    Height Weight BMI (Calculated) BSA (Calculated - sq m) BP Pulse   5' 7" (1.702 m) 69.4 kg (153 lb) 24 1.81 sq meters 146/75 69     Study Details A complete echo was performed using complete 2D, color flow Doppler and spectral Doppler. During the study, the apical, parasternal and subcostal views were captured. This was a portable study performed at the patient's bedside.     Echocardiography Findings    Left Ventricle The left ventricle is normal in size. Moderately increased ventricular mass. Moderately increased wall thickness. There is moderate concentric hypertrophy. Regional wall motion abnormalities present. Septal motion is abnormal. There is low normal systolic function with a visually estimated ejection fraction of 50 - 55%. Biplane (2D) method of discs ejection fraction is 45%. Global " longitudinal strain is -11.0%. Grade II diastolic dysfunction.   Right Ventricle Normal right ventricular cavity size. There is hypertrophy. Systolic function is normal.   Left Atrium Left atrium is severely dilated.   Right Atrium Normal right atrial size.   Aortic Valve There is moderate aortic valve sclerosis. There is mild annular calcification present. There is normal leaflet mobility. There is mild stenosis. Aortic valve area by VTI is 1.69 cm². Aortic valve peak velocity is 2.21 m/s. Mean gradient is 12 mmHg. The dimensionless index is 0.53.   Mitral Valve There is bileaflet sclerosis. There is normal leaflet mobility. There is mild regurgitation.   Tricuspid Valve The tricuspid valve is structurally normal. There is normal leaflet mobility.   Pulmonic Valve The pulmonic valve is structurally normal.   IVC/SVC Normal venous pressure at 3 mmHg.   Pericardium and Other Findings There is a trivial circumferential effusion.   Pulmonary Artery The estimated pulmonary artery systolic pressure is 22 mmHg.     PLAN:  - Continue with DAPT. ASA 81 and plavix 75mg qd  - Continue with Atorvastatin 80 mg HS   - Continue Isosorbide mononitrate 120 mg qd   - Continue with coreg and nifedipine (- BP goal 130/80 LDL 70)  - Continue with nitro sub-lingual PRN  - Pt will benefit from Gen Cardiology follow up upon discharge for symptomatic treatment and closer monitoring.  - Signing off today on this consult          Hypertensive emergency  Patient has a current diagnosis of Hypertensive emergency with end organ damage evidenced by acute pulmonary edema without heart failure which is controlled.  Latest blood pressure and vitals reviewed-   Temp:  [98.9 °F (37.2 °C)-100.4 °F (38 °C)]   Pulse:  [67-86]   Resp:  [5-36]   BP: (143-208)/()   SpO2:  [87 %-100 %] .   Patient currently on antihypertensives.   Home meds for hypertension were reviewed and noted below.   Hypertension Medications               amLODIPine (NORVASC) 10  MG tablet Take 10 mg by mouth.    carvediloL (COREG) 25 MG tablet Take 2 tablets (50 mg total) by mouth 2 (two) times daily with meals.    hydrALAZINE (APRESOLINE) 100 MG tablet Take 1 tablet (100 mg total) by mouth every 12 (twelve) hours.    isosorbide mononitrate (IMDUR) 120 MG 24 hr tablet Take 1 tablet (120 mg total) by mouth once daily.    isosorbide mononitrate (IMDUR) 60 MG 24 hr tablet Take 1 tablet by mouth once daily.    minoxidiL (LONITEN) 2.5 MG tablet Take 2 tablets (5 mg total) by mouth 2 (two) times daily.    NIFEdipine (PROCARDIA-XL) 90 MG (OSM) 24 hr tablet Take 1 tablet (90 mg total) by mouth once daily.    nitroGLYCERIN (NITROSTAT) 0.4 MG SL tablet Place 1 tablet (0.4 mg total) under the tongue every 5 (five) minutes as needed for Chest pain.          - Pt presented with Hypertensive emergency with flash pulmonary edema.   - Although reported medication compliance and HD schedule. Presented with chest pain, high BP and elevated troponin  - EKG: Notable for significant LVH with repolarization abnormalities. Notable Left atrial enlargement HR 80  NSR  - CXR: Pulmonary findings suggest congestive change or edema. No large focal consolidation   - Currently on HD and High flow NC     Plan   - Continue with Nifedipine 90 mg  - Continue with Coreg 50 mg BID   - Consider starting Hydralazine 100 mg BID   - Continue to monitor BP   - Follow up with Nephrology recommendations.         VTE Risk Mitigation (From admission, onward)           Ordered     heparin (porcine) injection 5,000 Units  Every 8 hours         09/03/24 0940     IP VTE HIGH RISK PATIENT  Once         09/02/24 1723     Place sequential compression device  Until discontinued         09/02/24 1714                    Thank you for your consult. I will sign off. Please contact us if you have any additional questions.Rest of the care as per primary team and Nephrology.    Isra Perrin MD  Cardiology   [facility]  [facility]

## 2024-09-03 NOTE — ED NOTES
Received report from PORTIA Penaloza. Assumed care of Haroldo Dickinson, a 48 y.o. male at this time.    Triage note:  Chief Complaint   Patient presents with    Chest Pain     Has stents     Review of patient's allergies indicates:   Allergen Reactions    No known allergies      Past Medical History:   Diagnosis Date    CAD (coronary artery disease), native coronary artery 11/21/2019    Cataract     Diabetes mellitus     Diabetic retinopathy     Dialysis patient     DM type 2 causing renal disease, not at goal     ESRD (end stage renal disease) started dialysis 01/2014 06/05/2014    History of colon polyps 02/10/2021    History of COVID-19 12/29/2022    Hyperparathyroidism, secondary renal 06/05/2014    Hypertension     NSTEMI (non-ST elevated myocardial infarction) 12/21/2013    Organ transplant candidate 06/05/2014    Pleural effusion 06/07/2024    Pneumonia     Post PTCA 08/26/2020    RCA HILLARY 7-25-20    Renal cell carcinoma of right kidney     Renal hypertension     Stroke

## 2024-09-03 NOTE — SUBJECTIVE & OBJECTIVE
Past Medical History:   Diagnosis Date    CAD (coronary artery disease), native coronary artery 11/21/2019    Cataract     Diabetes mellitus     Diabetic retinopathy     Dialysis patient     DM type 2 causing renal disease, not at goal     ESRD (end stage renal disease) started dialysis 01/2014 06/05/2014    History of colon polyps 02/10/2021    History of COVID-19 12/29/2022    Hyperparathyroidism, secondary renal 06/05/2014    Hypertension     NSTEMI (non-ST elevated myocardial infarction) 12/21/2013    Organ transplant candidate 06/05/2014    Pleural effusion 06/07/2024    Pneumonia     Post PTCA 08/26/2020    RCA HILLARY 7-25-20    Renal cell carcinoma of right kidney     Renal hypertension     Stroke        Past Surgical History:   Procedure Laterality Date    ANGIOGRAM, CORONARY, WITH LEFT HEART CATHETERIZATION N/A 7/1/2021    Procedure: Angiogram, Coronary, with Left Heart Cath;  Surgeon: Miki Zendejas MD;  Location: [facility] CATH LAB;  Service: Cardiology;  Laterality: N/A;    ANGIOGRAM, CORONARY, WITH LEFT HEART CATHETERIZATION N/A 11/28/2023    Procedure: Angiogram, Coronary, with Left Heart Cath;  Surgeon: Noah Pendleton MD;  Location: [facility] CATH LAB;  Service: Cardiology;  Laterality: N/A;    COLONOSCOPY N/A 5/3/2017    Procedure: COLONOSCOPY;  Surgeon: Rufino Carpenter MD;  Location: [facility] (4TH FLR);  Service: Endoscopy;  Laterality: N/A;  pt states can only schedule on Wednesdays    COLONOSCOPY N/A 2/9/2021    Procedure: COLONOSCOPY;  Surgeon: Edil Wong MD;  Location: [facility] (4TH FLR);  Service: Endoscopy;  Laterality: N/A;  Dialysis MWF/ labwork day of procedure  right arm aceess  per Dr. Colin pt can hold Plavix 5 days prior see note- sm  COVID test on 2/6/21 at [facility] - sm    COLONOSCOPY N/A 2/10/2021    Procedure: COLONOSCOPY;  Surgeon: Robert Ayers MD;  Location: [facility] (4TH FLR);  Service: Endoscopy;  Laterality: N/A;  rescheduled due to poor bowel prep-BB  negative covid screening  2/6/21-BB  dialysis M-W-F-BB  okay to r/s for 2/9/21 and to hold Plavix per Dr. KIRAN oWng-BB  labs same day-BB    CORONARY STENT PLACEMENT N/A 7/25/2019    Procedure: INSERTION, STENT, CORONARY ARTERY;  Surgeon: Miki Zendejas MD;  Location: [facility] CATH LAB;  Service: Cardiology;  Laterality: N/A;    DIALYSIS FISTULA CREATION      FISTULOGRAM N/A 2/18/2019    Procedure: Fistulogram;  Surgeon: Yaneth De La Cruz MD;  Location: [facility] CATH LAB;  Service: Cardiology;  Laterality: N/A;    FISTULOGRAM Right 7/23/2019    Procedure: Fistulogram;  Surgeon: Oz Cordoba MD;  Location: [facility] CATH LAB;  Service: Cardiology;  Laterality: Right;    LAPAROSCOPIC ROBOT-ASSISTED SURGICAL REMOVAL OF KIDNEY USING DA JAIME XI Right 5/19/2022    Procedure: XI ROBOTIC NEPHRECTOMY;  Surgeon: Aidan Hendricks MD;  [address]  Service: Urology;  Laterality: Right;  3hrs    LAPAROSCOPIC ROBOT-ASSISTED SURGICAL REMOVAL OF KIDNEY USING DA JAIME XI Left 1/5/2023    Procedure: XI ROBOTIC NEPHRECTOMY;  Surgeon: Aidan Hendricks MD;  Location: [facility] OR [facility] FLR;  Service: Urology;  Laterality: Left;  4 hrs    LEFT HEART CATHETERIZATION Left 7/25/2019    Procedure: Left heart cath;  Surgeon: Miki Zendejas MD;  Location: [facility] CATH LAB;  Service: Cardiology;  Laterality: Left;    REPAIR  1/5/2023    Procedure: REPAIR; COLOTOMY;  Surgeon: Maikel Lamb MD;  Location: [facility] OR [facility] FLR;  Service: General;;    RETINAL LASER PROCEDURE Bilateral 2018 or 2017    Dr. Rothman    VASCULAR SURGERY         Review of patient's allergies indicates:   Allergen Reactions    No known allergies      Current Facility-Administered Medications   Medication Frequency    acetaminophen tablet 650 mg Q6H PRN    albuterol-ipratropium 2.5 mg-0.5 mg/3 mL nebulizer solution 3 mL Q4H    aspirin EC tablet 81 mg Daily    atorvastatin tablet 80 mg Daily    benzonatate capsule 100 mg TID PRN    carvediloL tablet 50 mg BID WM    cinacalcet tablet 90 mg  Daily with breakfast    clopidogreL tablet 75 mg Daily    dextrose 10% bolus 125 mL 125 mL PRN    dextrose 10% bolus 250 mL 250 mL PRN    ezetimibe tablet 10 mg Daily    glucagon (human recombinant) injection 1 mg PRN    glucose chewable tablet 16 g PRN    glucose chewable tablet 24 g PRN    guaiFENesin 100 mg/5 ml syrup 200 mg Q4H PRN    heparin (porcine) injection 5,000 Units Q12H    insulin aspart U-100 pen 0-5 Units QID (AC + HS) PRN    melatonin tablet 6 mg Nightly PRN    NIFEdipine 24 hr tablet 90 mg Daily    nitroGLYCERIN SL tablet 0.4 mg Q5 Min PRN    polyethylene glycol packet 17 g Daily    sevelamer carbonate tablet 2,400 mg TID WM    sodium chloride 0.9% flush 10 mL PRN    sodium chloride 0.9% flush 10 mL PRN     Facility-Administered Medications Ordered in Other Encounters   Medication Frequency    lidocaine (PF) 10 mg/ml (1%) injection 10 mg Once     Family History       Problem Relation (Age of Onset)    Cancer Maternal Grandfather    Cataracts Mother    Diabetes Mother    Heart disease Brother    Hypertension Mother, Father, Sister, Brother    Kidney disease Brother          Tobacco Use    Smoking status: Never    Smokeless tobacco: Never   Substance and Sexual Activity    Alcohol use: Not Currently     Comment: One drink a month    Drug use: No    Sexual activity: Yes     Partners: Female     Birth control/protection: None     Review of Systems  Objective:     Vital Signs (Most Recent):  Temp: 98.2 °F (36.8 °C) (09/03/24 0415)  Pulse: 67 (09/03/24 0600)  Resp: (!) 22 (09/03/24 0600)  BP: (!) 148/73 (09/03/24 0600)  SpO2: 97 % (09/03/24 0600) Vital Signs (24h Range):  Temp:  [98.2 °F (36.8 °C)-100.4 °F (38 °C)] 98.2 °F (36.8 °C)  Pulse:  [65-86] 67  Resp:  [5-36] 22  SpO2:  [87 %-100 %] 97 %  BP: (143-208)/() 148/73     Weight: 69.4 kg (153 lb) (09/03/24 0052)  Body mass index is 23.96 kg/m².  Body surface area is 1.81 meters squared.    I/O last 3 completed shifts:  In: 120 [P.O.:120]  Out:  "3500 [Other:3500]     Physical Exam     Significant Labs:  ABGs:   Recent Labs   Lab 09/02/24 2031   PH 7.473*   PCO2 37.1   HCO3 27.2   POCSATURATED 96   BE 4*     BMP:   Recent Labs   Lab 09/03/24 0407   GLU 82      K 3.6      CO2 23   BUN 17   CREATININE 4.1*   CALCIUM 10.4   MG 2.0     Cardiac Markers: No results for input(s): "CKMB", "TROPONINT", "MYOGLOBIN" in the last 168 hours.  CBC:   Recent Labs   Lab 09/03/24 0407   WBC 14.26*   RBC 3.71*   HGB 11.0*   HCT 35.0*      MCV 94   MCH 29.6   MCHC 31.4*     CMP:   Recent Labs   Lab 09/03/24 0407   GLU 82   CALCIUM 10.4   ALBUMIN 3.5   PROT 9.0*      K 3.6   CO2 23      BUN 17   CREATININE 4.1*   ALKPHOS 77   ALT 10   AST 12   BILITOT 1.8*     All labs within the past 24 hours have been reviewed.    "

## 2024-09-03 NOTE — H&P
[facility] - Emergency Dept  Critical Care Medicine  History & Physical    Patient Name: Haroldo Dickinson  MRN: [id number]  Admission Date: 9/2/2024  Hospital Length of Stay: 0 days  Code Status: Full Code  Attending Physician: Denver Manuel*   Primary Care Provider: Mireya Larose MD   Principal Problem: Acute hypoxemic respiratory failure    Subjective:     HPI:  48-year-old male with a past medical history of HTN compilcated by CAD (s/p HILLARY to RCA in 2020), HFpEF, ESRD on dialysis (MWF), previous RCC (s/p bilateral nephrectomies), T2DM, and hx of CVA (w/ residual R-sided deficits) who presented to [facility] ED on 9/2/24 with chest pain. Patient reported a tingling sensation in his left upper chest and left arm which is worse while lying flat and improves with sitting forward. Additionally patient reported associated difficulty breathing and cough. Patient last completed dialysis on the morning prior to arriving in the ED. Patient endorsed that he experiences these symptoms when he is fluid overloaded and requires dialysis.     ED Course  In the emergency department, /83, HR 72, and sPO2 95% on HFNC. Workup consistent with leukocytosis (WBC 14), CMP consistent with ESRD and stable electrolytes, BNP 1900 (increased from baseline), and trop #1 0.095 and trop #2 0.1(though improved from baseline 12). EKG NSR w/o ST depressions or elevations. CXR with significant pulmonary oedema. Patient evaluated by nephrology and recommended to be admitted for dialysis. Patient initially admitted to  for HD.    Hosptal Course:  Prior to leaving ED for floor patient had noted /104 and MICU consulted for nitro drip. Patient transferred to MICU and nitro not initiated as patient requires HD.         Hospital/ICU Course:  Haroldo Dickinson with a history of HTN compilcated by CAD (s/p HILLARY to RCA in 2020), HFpEF, ESRD on dialysis (MWF), previous RCC (s/p bilateral nephrectomies), T2DM, and hx of CVA (w/ residual R-sided  deficits) admitted to the MICU secondary to fluid overload and BP of 201/104. Patient received dialysis overnight symptoms improved significantly. Cardiology did not think his symptoms were ACS related and recommended continuing home medications. He was weaned off HFNC to NC the day after dialysis. Requesting stepdown at this time.     Interval History/Significant Events: NAEON. Patient received dialysis and feels much better this AM. Able to wean off HFNC to NC. Will stepdown today.           Past Medical History:   Diagnosis Date    CAD (coronary artery disease), native coronary artery 11/21/2019    Cataract      Diabetes mellitus      Diabetic retinopathy      Dialysis patient      DM type 2 causing renal disease, not at goal      ESRD (end stage renal disease) started dialysis 01/2014 06/05/2014    History of colon polyps 02/10/2021    History of COVID-19 12/29/2022    Hyperparathyroidism, secondary renal 06/05/2014    Hypertension      NSTEMI (non-ST elevated myocardial infarction) 12/21/2013    Organ transplant candidate 06/05/2014    Pleural effusion 06/07/2024    Pneumonia      Post PTCA 08/26/2020     RCA HILLARY 7-25-20    Renal cell carcinoma of right kidney      Renal hypertension      Stroke                 Past Surgical History:   Procedure Laterality Date    ANGIOGRAM, CORONARY, WITH LEFT HEART CATHETERIZATION N/A 7/1/2021     Procedure: Angiogram, Coronary, with Left Heart Cath;  Surgeon: Miki Zendejas MD;  Location: [facility] CATH LAB;  Service: Cardiology;  Laterality: N/A;    ANGIOGRAM, CORONARY, WITH LEFT HEART CATHETERIZATION N/A 11/28/2023     Procedure: Angiogram, Coronary, with Left Heart Cath;  Surgeon: Noah Pendleton MD;  Location: [facility] CATH LAB;  Service: Cardiology;  Laterality: N/A;    COLONOSCOPY N/A 5/3/2017     Procedure: COLONOSCOPY;  Surgeon: Rufino Carpenter MD;  Location: [facility] ENDO ([address] [place] [address]);  Service: Endoscopy;  Laterality: N/A;  pt states can only schedule on Wednesdays     COLONOSCOPY N/A 2/9/2021     Procedure: COLONOSCOPY;  Surgeon: Edil Wong MD;  Location: [facility] ENDO (4TH FLR);  Service: Endoscopy;  Laterality: N/A;  Dialysis MWF/ labwork day of procedure  right arm aceess  per Dr. Colin pt can hold Plavix 5 days prior see note- sm  COVID test on 2/6/21 at [facility] -     COLONOSCOPY N/A 2/10/2021     Procedure: COLONOSCOPY;  Surgeon: Rboert Ayers MD;  Location: [facility] ENDO (4TH FLR);  Service: Endoscopy;  Laterality: N/A;  rescheduled due to poor bowel prep-BB  negative covid screening 2/6/21-BB  dialysis M-W-F-BB  okay to r/s for 2/9/21 and to hold Plavix per Dr. KIRAN Wong-BB  labs same day-BB    CORONARY STENT PLACEMENT N/A 7/25/2019     Procedure: INSERTION, STENT, CORONARY ARTERY;  Surgeon: Miki Zendejas MD;  Location: [facility] CATH LAB;  Service: Cardiology;  Laterality: N/A;    DIALYSIS FISTULA CREATION        FISTULOGRAM N/A 2/18/2019     Procedure: Fistulogram;  Surgeon: Yaneth De La Cruz MD;  Location: [facility] CATH LAB;  Service: Cardiology;  Laterality: N/A;    FISTULOGRAM Right 7/23/2019     Procedure: Fistulogram;  Surgeon: Oz Cordoba MD;  Location: [facility] CATH LAB;  Service: Cardiology;  Laterality: Right;    LAPAROSCOPIC ROBOT-ASSISTED SURGICAL REMOVAL OF KIDNEY USING DA JAIME XI Right 5/19/2022     Procedure: XI ROBOTIC NEPHRECTOMY;  Surgeon: Aidan Hendricks MD;  Location: [address];  Service: Urology;  Laterality: Right;  3hrs    LAPAROSCOPIC ROBOT-ASSISTED SURGICAL REMOVAL OF KIDNEY USING DA JAIME XI Left 1/5/2023     Procedure: XI ROBOTIC NEPHRECTOMY;  Surgeon: Aidan Hendricks MD;  Location: [address];  Service: Urology;  Laterality: Left;  4 hrs    LEFT HEART CATHETERIZATION Left 7/25/2019     Procedure: Left heart cath;  Surgeon: Miki Zendejas MD;  Location: [facility] CATH LAB;  Service: Cardiology;  Laterality: Left;    REPAIR   1/5/2023     Procedure: REPAIR; COLOTOMY;  Surgeon: Maikel Lamb MD;  Location: [facility] OR [address] [place] [address];   Service: General;;    RETINAL LASER PROCEDURE Bilateral 2018 or 2017     Dr. Rothman    VASCULAR SURGERY                  Review of patient's allergies indicates:   Allergen Reactions    No known allergies           Family History         Problem Relation (Age of Onset)     Cancer Maternal Grandfather     Cataracts Mother     Diabetes Mother     Heart disease Brother     Hypertension Mother, Father, Sister, Brother     Kidney disease Brother                   Tobacco Use    Smoking status: Never    Smokeless tobacco: Never   Substance and Sexual Activity    Alcohol use: Not Currently       Comment: One drink a month    Drug use: No    Sexual activity: Yes       Partners: Female       Birth control/protection: None      Review of Systems   Constitutional:  Negative for fatigue and fever.   HENT:  Negative for rhinorrhea and sore throat.    Eyes:  Negative for photophobia and visual disturbance.   Respiratory:  Positive for cough. Negative for chest tightness and shortness of breath.    Cardiovascular:  Negative for chest pain and palpitations.   Gastrointestinal:  Negative for abdominal pain, nausea and vomiting.   Genitourinary:  Negative for dysuria and flank pain.   Musculoskeletal:  Negative for back pain and neck pain.   Skin:  Negative for rash.   Neurological:  Negative for weakness and headaches.   Hematological:  Does not bruise/bleed easily.   Psychiatric/Behavioral:  Negative for confusion and decreased concentration.       Objective:      Vital Signs (Most Recent):  Temp: 98.2 °F (36.8 °C) (09/03/24 0415)  Pulse: 63 (09/03/24 1141)  Resp: 20 (09/03/24 1141)  BP: (!) 146/75 (09/03/24 1000)  SpO2: 98 % (09/03/24 1141) Vital Signs (24h Range):  Temp:  [98.2 °F (36.8 °C)-100.4 °F (38 °C)] 98.2 °F (36.8 °C)  Pulse:  [63-86] 63  Resp:  [5-36] 20  SpO2:  [87 %-100 %] 98 %  BP: (144-208)/() 146/75   Weight: 69.4 kg (153 lb)  Body mass index is 23.96 kg/m².        Intake/Output Summary (Last 24 hours) at  9/3/2024 1302  Last data filed at 9/3/2024 0900      Gross per 24 hour   Intake 220 ml   Output 3500 ml   Net -3280 ml            Physical Exam  Vitals and nursing note reviewed.   Constitutional:       Appearance: Normal appearance. He is not ill-appearing, toxic-appearing or diaphoretic.   HENT:      Head: Normocephalic and atraumatic.      Nose: Nose normal.   Eyes:      Extraocular Movements: Extraocular movements intact.   Cardiovascular:      Rate and Rhythm: Normal rate and regular rhythm.      Pulses: Normal pulses.   Pulmonary:      Effort: Pulmonary effort is normal.      Breath sounds: Normal breath sounds.      Comments: On NC  Abdominal:      General: Abdomen is flat. There is no distension.      Palpations: Abdomen is soft.   Musculoskeletal:         General: No swelling or tenderness.      Cervical back: Normal range of motion.      Right lower leg: No edema.      Left lower leg: No edema.   Skin:     General: Skin is warm.      Capillary Refill: Capillary refill takes less than 2 seconds.      Coloration: Skin is not pale.      Findings: No erythema.   Neurological:      General: No focal deficit present.      Mental Status: He is alert and oriented to person, place, and time.   Psychiatric:         Mood and Affect: Mood normal.         Behavior: Behavior normal.           Vents:  Oxygen Concentration (%): 50 (09/03/24 0900)  Lines/Drains/Airways         Peripheral Intravenous Line  Duration                     Hemodialysis AV Fistula Right forearm -- days          Peripheral IV - Single Lumen 09/02/24 1223 20 G Anterior;Distal;Left Upper Arm 1 day                       Significant Labs:     CBC/Anemia Profile:        Recent Labs   Lab 09/02/24  1223 09/02/24 2031 09/03/24  0407   WBC 14.01*  --  14.26*   HGB 9.9*  --  11.0*   HCT 32.3* 33* 35.0*     --  326   MCV 96  --  94   RDW 16.1*  --  15.9*         Chemistries:        Recent Labs   Lab 09/02/24  1330 09/02/24 2028 09/03/24  0407   NA  140 139 137   K 3.4* 4.1 3.6    104 103   CO2 24 23 23   BUN 26* 28* 17   CREATININE 6.0* 6.9* 4.1*   CALCIUM 10.0 9.9 10.4   ALBUMIN 3.2* 3.0* 3.5   PROT 7.6 7.5 9.0*   BILITOT 1.1* 1.3* 1.8*   ALKPHOS 67 67 77   ALT 8* 8* 10   AST 11 11 12   MG  --  1.8 2.0   PHOS  --   --  2.1*         All pertinent labs within the past 24 hours have been reviewed.     Significant Imaging: I have reviewed all pertinent imaging results/findings within the past 24 hours.    Assessment/Plan:     Pulmonary  * Acute hypoxemic respiratory failure  48-year-old male with known HTN, CAD, T2DM, and ESRD on HD now admitted to MICU with acute hypoxic respiratory failure and NSTEMI in setting of ESRD fluid overload.    #Resp  Acute Hypoxemic Respiratory Failure  Admit CXR with diffuse pulmonary oedema  BNP 1900  Started on HFNC in ED     Patient is not on home oxygen. Supplemental oxygen was provided and noted- Oxygen Concentration (%):  [80] 80    Signs/symptoms of respiratory failure include- increased work of breathing. Contributing diagnoses includes - ESRD associated fluid overload Labs and images were reviewed. Patient Has recent ABG, which has been reviewed. Will treat underlying causes and adjust management of respiratory failure as follows- HFNC    ABG  Recent Labs   Lab 09/02/24 2031   PH 7.473*   PO2 79*   PCO2 37.1   HCO3 27.2   BE 4*        Plan   - Off HFNC and currently using NC   - [facility] consult for HD. Dialyzed last night and plans for dialysis tomorrow (9/4)    #CV  NSTEMI (non-ST elevated myocardial infarction)  Central chest pain radiating to L arm partially relieved by nitroglycerin, troponin 0.095 -> 0.101 in ED. Repeat 0.086. BNP 1.9k. CXR suggestive of pulmonary edema, L pleural effusion.      Plan  - consult cardiology, appreciate recs. Recommend treating hypertension and following up troponin for now  - nephrology consulted for HD, likely need to fluid removal  - trend troponin  - EKG if chest pain  - manage  hypertension         - continue home coreg, nifedipine         - continue hydralazine, imdur  - covid neg. F/u echo        Coronary artery disease of native artery of native heart with stable angina pectoris  Patient with known CAD s/p stent placement, which is controlled Will continue ASA, Plavix, and Statin and monitor for S/Sx of angina/ACS. Continue to monitor on telemetry. s/p HILLARY of RCA 2020 with coronary angio in Nov 2023 with three vessel disease (LAD 90%, %, LAD 75%)     Plan  - consult cardiology, appreciate recs  - management of HTN  - continue DAPT, statin  - PRN nitro        Essential hypertension  Chronic, uncontrolled. Latest blood pressure and vitals reviewed-     While in the hospital, will manage blood pressure as follows; Adjust home antihypertensive regimen as follows-              - home nifedipine, carvedilol, hydralazine     Will utilize p.r.n. blood pressure medication only if patient's blood pressure greater than 180/110 and he develops symptoms such as worsening chest pain or shortness of breath.       #Neuro  No acute concerns    #FEN/GI  Type 2 diabetes mellitus with chronic kidney disease on chronic dialysis, without long-term current use of insulin  Creatine stable for now. BMP reviewed- noted Estimated Creatinine Clearance: 14.4 mL/min (A) (based on SCr of 6 mg/dL (H)). according to latest data. Based on current GFR, CKD stage is end stage.  Monitor UOP and serial BMP and adjust therapy as needed. Renally dose meds. Avoid nephrotoxic medications and procedures.     A1c July 2024 4.3%     Plan  - low dose SS insulin       Hyperparathyroidism, secondary renal  Continue home cinacalcet    #Renal  ESRD on hemodialysis  Creatine stable for now. BMP reviewed- noted Estimated Creatinine Clearance: 14.4 mL/min (A) (based on SCr of 6 mg/dL (H)). according to latest data. Based on current GFR, CKD stage is end stage.  Monitor UOP and serial BMP and adjust therapy as needed. Renally dose  meds. Avoid nephrotoxic medications and procedures.     Plan  - nephrology consulted, appreciate recs  - HD via R AVF, last dialyzed 9/2/24  - possible fluid overload, may need to draw off fluid with iHD per nephro  - Nephrology plans for dialysis 9/4    #Heme/ID  Anemia in end-stage renal disease  Anemia is likely due to chronic disease due to ESRD. Most recent hemoglobin 9     Plan  - Monitor serial CBC: Daily  - Transfuse PRBC if patient becomes hemodynamically unstable, symptomatic or H/H drops below 7/21.  - Patient has not received any PRBC transfusions to date  - Patient's anemia is currently stable       Code: Full  Access: PIV  Social: No concerns  Dispo: Stepdown          Critical Care Daily Checklist:    A: Awake: RASS Goal/Actual Goal: RASS Goal: 0-->alert and calm  Actual:     B: Spontaneous Breathing Trial Performed?     C: SAT & SBT Coordinated?  N/A                      D: Delirium: CAM-ICU Overall CAM-ICU: Negative   E: Early Mobility Performed? Yes   F: Feeding Goal:    Status:     Current Diet Order   Procedures    Diet Renal Fluid - 1500mL     Order Specific Question:   Fluid restriction:     Answer:   Fluid - 1500mL      AS: Analgesia/Sedation N/A   T: Thromboembolic Prophylaxis Heparin   H: HOB > 300 Yes   U: Stress Ulcer Prophylaxis (if needed) N/A   G: Glucose Control SSI   B: Bowel Function     I: Indwelling Catheter (Lines & Rossi) Necessity PIV   D: De-escalation of Antimicrobials/Pharmacotherapies N/A    Plan for the day/ETD HD    Code Status:  Family/Goals of Care: Full Code  N/A       Critical secondary to Patient has a condition that poses threat to life and bodily function: Severe Respiratory Distress in setting of ESRD fluid overload requiring HD and HFNC    Critical care was time spent personally by me on the following activities: development of treatment plan with patient or surrogate and bedside caregivers, discussions with consultants, evaluation of patient's response to  treatment, examination of patient, ordering and performing treatments and interventions, ordering and review of laboratory studies, ordering and review of radiographic studies, pulse oximetry, re-evaluation of patient's condition. This critical care time did not overlap with that of any other provider or involve time for any procedures.     Pieter Ocasio MD  Critical Care Medicine  [facility] - Emergency Dept

## 2024-09-03 NOTE — PLAN OF CARE
MICU DAILY GOALS     Family/Goals of care/Code Status   Code Status: Full Code    24H Vital Sign Range  Temp:  [98.2 °F (36.8 °C)-100.4 °F (38 °C)]   Pulse:  [65-86]   Resp:  [5-36]   BP: (143-208)/()   SpO2:  [87 %-100 %]      Shift Events (include procedures and significant events)   Hemodialysis performed this am with net uf 3 liters    AWAKE RASS: Goal -    Actual -      Restraint necessity: Not necessary   BREATHE SBT: Not intubated    Coordinate A & B, analgesics/sedatives Pain: managed   SAT: Not intubated   Delirium CAM-ICU: Overall CAM-ICU: Negative   Early(intubated/ Progressive (non-intubated) Mobility MOVE Screen (INTUBATED ONLY): Not intubated    Activity: Activity Management: Rolling - L1   Feeding/Nutrition Diet order: Diet/Nutrition Received: 2 gram sodium,     Thrombus DVT prophylaxis: VTE Required Core Measure: Pharmacological prophylaxis initiated/maintained   HOB Elevation Head of Bed (HOB) Positioning: HOB at 30-45 degrees   Ulcer Prophylaxis GI: no   Glucose control managed Glycemic Management: blood glucose monitored   Skin Skin assessed during: Daily Assessment    Sacrum intact/not altered? Yes  Heels intact/not altered? Yes  Surgical wound? No    CHECK ONE!   (no altered skin or altered skin) and sub boxes:  [x] No Altered Skin Integrity Present    [x]Prevention Measures Documented    [] Altered Skin Integrity Present or Discovered   [] LDA present in EPIC, daily doc completed              [] LDA added if not in EPIC (describe wound).                    When describing wound, do not stage, use descriptive words only.    [] Wound Image Taken (required on admit,                   transfer/discharge and every Tuesday)    Wound Care Consulted? No    4 EYES:  Attending Nurse (1st set of eyes):     Second RN/Staff Member (2nd set of eyes):    Bowel Function no issues    Indwelling Catheter Necessity            De-escalation Antibiotics NA        VS and assessment per flow sheet, patient  progressing towards goals as tolerated, plan of care reviewed with Haroldo Dickinson, all concerns addressed, will continue to monitor.

## 2024-09-03 NOTE — SUBJECTIVE & OBJECTIVE
Interval History: Completed HD this morning, no acute events.    Review of patient's allergies indicates:   Allergen Reactions    No known allergies      Current Facility-Administered Medications   Medication Frequency    acetaminophen tablet 650 mg Q6H PRN    albuterol-ipratropium 2.5 mg-0.5 mg/3 mL nebulizer solution 3 mL Q4H    aspirin EC tablet 81 mg Daily    atorvastatin tablet 80 mg Daily    benzonatate capsule 100 mg TID PRN    carvediloL tablet 50 mg BID WM    cinacalcet tablet 90 mg Daily with breakfast    clopidogreL tablet 75 mg Daily    dextrose 10% bolus 125 mL 125 mL PRN    dextrose 10% bolus 250 mL 250 mL PRN    ezetimibe tablet 10 mg Daily    guaiFENesin 100 mg/5 ml syrup 200 mg Q4H PRN    heparin (porcine) injection 5,000 Units Q8H    hydrALAZINE tablet 100 mg Q8H    insulin aspart U-100 pen 0-5 Units QID (AC + HS) PRN    isosorbide mononitrate 24 hr tablet 120 mg Daily    melatonin tablet 6 mg Nightly PRN    NIFEdipine 24 hr tablet 90 mg Daily    nitroGLYCERIN SL tablet 0.4 mg Q5 Min PRN    polyethylene glycol packet 17 g Daily    sodium chloride 0.9% flush 10 mL PRN     Facility-Administered Medications Ordered in Other Encounters   Medication Frequency    lidocaine (PF) 10 mg/ml (1%) injection 10 mg Once       Objective:     Vital Signs (Most Recent):  Temp: 98.2 °F (36.8 °C) (09/03/24 0415)  Pulse: 65 (09/03/24 0939)  Resp: (!) 24 (09/03/24 0939)  BP: (!) 166/92 (09/03/24 0900)  SpO2: (!) 93 % (09/03/24 0939) Vital Signs (24h Range):  Temp:  [98.2 °F (36.8 °C)-100.4 °F (38 °C)] 98.2 °F (36.8 °C)  Pulse:  [64-86] 65  Resp:  [5-36] 24  SpO2:  [87 %-100 %] 93 %  BP: (143-208)/() 166/92     Weight: 69.4 kg (153 lb) (09/03/24 0052)  Body mass index is 23.96 kg/m².  Body surface area is 1.81 meters squared.    I/O last 3 completed shifts:  In: 120 [P.O.:120]  Out: 3500 [Other:3500]     Physical Exam  Constitutional:       Appearance: Normal appearance.   HENT:      Head: Normocephalic and  atraumatic.      Right Ear: External ear normal.      Left Ear: External ear normal.      Nose: Nose normal.      Mouth/Throat:      Mouth: Mucous membranes are moist.   Pulmonary:      Effort: Pulmonary effort is normal. No respiratory distress.      Breath sounds: No wheezing, rhonchi or rales.   Skin:     General: Skin is warm and dry.      Capillary Refill: Capillary refill takes less than 2 seconds.   Neurological:      General: No focal deficit present.      Mental Status: He is alert and oriented to person, place, and time.          Significant Labs:  CBC:   Recent Labs   Lab 09/03/24  0407   WBC 14.26*   RBC 3.71*   HGB 11.0*   HCT 35.0*      MCV 94   MCH 29.6   MCHC 31.4*     CMP:   Recent Labs   Lab 09/03/24  0407   GLU 82   CALCIUM 10.4   ALBUMIN 3.5   PROT 9.0*      K 3.6   CO2 23      BUN 17   CREATININE 4.1*   ALKPHOS 77   ALT 10   AST 12   BILITOT 1.8*     All labs within the past 24 hours have been reviewed.     Significant Imaging:

## 2024-09-03 NOTE — ED NOTES
Received report from Mayuri PEARCE. Assumed care of patient. Patient is alert and resting comfortably in bed in NAD. BP, cardiac, and O2 monitoring continued.Patient updated on plan of care, pt denies any needs or complaints at this time. Bed locked in lowest position, side rails up x2, call light within reach. VSS. PT placed on high flow nasal canula and brought over for evaluation by hospital medicine and ICU. Pt states he is in no pain or distress but vital signs are elevated

## 2024-09-03 NOTE — ED NOTES
ED tech informed this RN at this time of pt O2 of 87% on 2L nasal cannula. RN immediately went to pt bedside to assess pt with vital signs machine. RN confirmed pt O2 at 87%. Pt appears tachypneic but denies SOB and chest pain at this time.

## 2024-09-03 NOTE — ASSESSMENT & PLAN NOTE
48-year-old male with known HTN, CAD, T2DM, and ESRD on HD now admitted to MICU with acute hypoxic respiratory failure and NSTEMI in setting of ESRD fluid overload.    #Resp  Acute Hypoxemic Respiratory Failure  Admit CXR with diffuse pulmonary oedema  BNP 1900  Started on HFNC in ED     Patient is not on home oxygen. Supplemental oxygen was provided and noted- Oxygen Concentration (%):  [80] 80    Signs/symptoms of respiratory failure include- increased work of breathing. Contributing diagnoses includes - ESRD associated fluid overload Labs and images were reviewed. Patient Has recent ABG, which has been reviewed. Will treat underlying causes and adjust management of respiratory failure as follows- HFNC    ABG  Recent Labs   Lab 09/02/24 2031   PH 7.473*   PO2 79*   PCO2 37.1   HCO3 27.2   BE 4*      Plan   - HFNC, wean as able   - Nepro consult for HD    #CV  NSTEMI (non-ST elevated myocardial infarction)  Central chest pain radiating to L arm partially relieved by nitroglycerin, troponin 0.095 -> 0.101 in ED. Repeat 0.086. BNP 1.9k. CXR suggestive of pulmonary edema, L pleural effusion.      Plan  - consult cardiology, appreciate recs. Recommend treating hypertension and following up troponin for now  - nephrology consulted for HD, likely need to fluid removal  - trend troponin  - EKG if chest pain  - manage hypertension         - continue home coreg, nifedipine         - add back hydralazine if persistently hypertensive  - f/u echocardiogram, covid/flu        Coronary artery disease of native artery of native heart with stable angina pectoris  Patient with known CAD s/p stent placement, which is controlled Will continue ASA, Plavix, and Statin and monitor for S/Sx of angina/ACS. Continue to monitor on telemetry. s/p HILLARY of RCA 2020 with coronary angio in Nov 2023 with three vessel disease (LAD 90%, %, LAD 75%)     Plan  - consult cardiology, appreciate recs  - management of HTN  - continue DAPT,  statin  - PRN nitro        Essential hypertension  Chronic, uncontrolled. Latest blood pressure and vitals reviewed-     While in the hospital, will manage blood pressure as follows; Adjust home antihypertensive regimen as follows-              - home nifedipine, carvedilol         - add hydralazine after monitoring response to the above     Will utilize p.r.n. blood pressure medication only if patient's blood pressure greater than 180/110 and he develops symptoms such as worsening chest pain or shortness of breath.       #Neuro  No acute concerns    #FEN/GI  Type 2 diabetes mellitus with chronic kidney disease on chronic dialysis, without long-term current use of insulin  Creatine stable for now. BMP reviewed- noted Estimated Creatinine Clearance: 14.4 mL/min (A) (based on SCr of 6 mg/dL (H)). according to latest data. Based on current GFR, CKD stage is end stage.  Monitor UOP and serial BMP and adjust therapy as needed. Renally dose meds. Avoid nephrotoxic medications and procedures.     A1c July 2024 4.3%     Plan  - low dose SS insulin       Hyperparathyroidism, secondary renal  Continue home sevelamer, cinacalcet    #Renal  ESRD on hemodialysis  Creatine stable for now. BMP reviewed- noted Estimated Creatinine Clearance: 14.4 mL/min (A) (based on SCr of 6 mg/dL (H)). according to latest data. Based on current GFR, CKD stage is end stage.  Monitor UOP and serial BMP and adjust therapy as needed. Renally dose meds. Avoid nephrotoxic medications and procedures.     Plan  - nephrology consulted, appreciate recs  - HD via R AVF, last dialyzed 9/2/24  - possible fluid overload, may need to draw off fluid with iHD per nephro    #Heme/ID  Anemia in end-stage renal disease  Anemia is likely due to chronic disease due to ESRD. Most recent hemoglobin 9     Plan  - Monitor serial CBC: Daily  - Transfuse PRBC if patient becomes hemodynamically unstable, symptomatic or H/H drops below 7/21.  - Patient has not received any  PRBC transfusions to date  - Patient's anemia is currently stable       Code: Full  Access: PIV  Social: No concerns  Dispo: Pending HD and HFNC

## 2024-09-03 NOTE — ED NOTES
RN spoke with Dr. Faye at this time to inform of pt O2 90% on 7L nasal cannula. Per Dr. Faye, he will be at pt bedside shortly after consulting with team.

## 2024-09-03 NOTE — HPI
48-year-old male with a past medical history of HTN compilcated by CAD (s/p HILLARY to RCA in 2020), HFpEF, ESRD on dialysis (MWF), previous RCC (s/p bilateral nephrectomies), T2DM, and hx of CVA (w/ residual R-sided deficits) who [place] [facility] [place]24 with chest pain. Patient reported a tingling sensation in his left upper chest and left arm which is worse while lying flat and improves with sitting forward. Additionally patient reported associated difficulty breathing and cough. Patient last completed dialysis on the morning prior to arriving in the ED. Patient endorsed that he experiences these symptoms when he is fluid overloaded and requires dialysis.     ED Course  In the emergency department, /83, HR 72, and sPO2 95% on HFNC. Workup consistent with leukocytosis (WBC 14), CMP consistent with ESRD and stable electrolytes, BNP 1900 (increased from baseline), and trop #1 0.095 and trop #2 0.1(though improved from baseline 12). EKG NSR w/o ST depressions or elevations. CXR with significant pulmonary oedema. Patient evaluated by nephrology and recommended to be admitted for dialysis. Patient initially admitted to  for HD.    Hosptal Course:  Prior to leaving ED for floor patient had noted /104 and MICU consulted for nitro drip. Patient transferred to MICU and nitro not initiated as patient requires HD.

## 2024-09-03 NOTE — SUBJECTIVE & OBJECTIVE
Past Medical History:   Diagnosis Date    CAD (coronary artery disease), native coronary artery 11/21/2019    Cataract     Diabetes mellitus     Diabetic retinopathy     Dialysis patient     DM type 2 causing renal disease, not at goal     ESRD (end stage renal disease) started dialysis 01/2014 06/05/2014    History of colon polyps 02/10/2021    History of COVID-19 12/29/2022    Hyperparathyroidism, secondary renal 06/05/2014    Hypertension     NSTEMI (non-ST elevated myocardial infarction) 12/21/2013    Organ transplant candidate 06/05/2014    Pleural effusion 06/07/2024    Pneumonia     Post PTCA 08/26/2020    RCA HILLARY 7-25-20    Renal cell carcinoma of right kidney     Renal hypertension     Stroke        Past Surgical History:   Procedure Laterality Date    ANGIOGRAM, CORONARY, WITH LEFT HEART CATHETERIZATION N/A 7/1/2021    Procedure: Angiogram, Coronary, with Left Heart Cath;  Surgeon: Miki Zendejas MD;  Location: [facility] CATH LAB;  Service: Cardiology;  Laterality: N/A;    ANGIOGRAM, CORONARY, WITH LEFT HEART CATHETERIZATION N/A 11/28/2023    Procedure: Angiogram, Coronary, with Left Heart Cath;  Surgeon: Noah Pendleton MD;  Location: [facility] CATH LAB;  Service: Cardiology;  Laterality: N/A;    COLONOSCOPY N/A 5/3/2017    Procedure: COLONOSCOPY;  Surgeon: Rufino Carpenter MD;  Location: [facility] (4TH FLR);  Service: Endoscopy;  Laterality: N/A;  pt states can only schedule on Wednesdays    COLONOSCOPY N/A 2/9/2021    Procedure: COLONOSCOPY;  Surgeon: Edil Wong MD;  Location: [facility] (4TH FLR);  Service: Endoscopy;  Laterality: N/A;  Dialysis MWF/ labwork day of procedure  right arm aceess  per Dr. Colin pt can hold Plavix 5 days prior see note- sm  COVID test on 2/6/21 at [facility] - sm    COLONOSCOPY N/A 2/10/2021    Procedure: COLONOSCOPY;  Surgeon: Robert Ayers MD;  Location: [facility] (4TH FLR);  Service: Endoscopy;  Laterality: N/A;  rescheduled due to poor bowel prep-BB  negative covid screening  2/6/21-BB  dialysis M-W-F-BB  okay to r/s for 2/9/21 and to hold Plavix per Dr. KIRAN Wong-BB  labs same day-BB    CORONARY STENT PLACEMENT N/A 7/25/2019    Procedure: INSERTION, STENT, CORONARY ARTERY;  Surgeon: Miki Zendejas MD;  Location: [facility] CATH LAB;  Service: Cardiology;  Laterality: N/A;    DIALYSIS FISTULA CREATION      FISTULOGRAM N/A 2/18/2019    Procedure: Fistulogram;  Surgeon: Yaneth De La Cruz MD;  Location: [facility] CATH LAB;  Service: Cardiology;  Laterality: N/A;    FISTULOGRAM Right 7/23/2019    Procedure: Fistulogram;  Surgeon: Oz Cordoba MD;  Location: [facility] CATH LAB;  Service: Cardiology;  Laterality: Right;    LAPAROSCOPIC ROBOT-ASSISTED SURGICAL REMOVAL OF KIDNEY USING DA JAIME XI Right 5/19/2022    Procedure: XI ROBOTIC NEPHRECTOMY;  Surgeon: Aidan Hendricks MD;  [address]  Service: Urology;  Laterality: Right;  3hrs    LAPAROSCOPIC ROBOT-ASSISTED SURGICAL REMOVAL OF KIDNEY USING DA JAIME XI Left 1/5/2023    Procedure: XI ROBOTIC NEPHRECTOMY;  Surgeon: Aidan Hendricks MD;  [facility]  Service: Urology;  Laterality: Left;  4 hrs    LEFT HEART CATHETERIZATION Left 7/25/2019    Procedure: Left heart cath;  Surgeon: Miki Zendejas MD;  Location: [facility] CATH LAB;  Service: Cardiology;  Laterality: Left;    REPAIR  1/5/2023    Procedure: REPAIR; COLOTOMY;  Surgeon: Maikel Lamb MD;  [facility];  Service: General;;    RETINAL LASER PROCEDURE Bilateral 2018 or 2017    Dr. Rothman    VASCULAR SURGERY         Review of patient's allergies indicates:   Allergen Reactions    No known allergies        Family History       Problem Relation (Age of Onset)    Cancer Maternal Grandfather    Cataracts Mother    Diabetes Mother    Heart disease Brother    Hypertension Mother, Father, Sister, Brother    Kidney disease Brother          Tobacco Use    Smoking status: Never    Smokeless tobacco: Never   Substance and Sexual Activity    Alcohol  use: Not Currently     Comment: One drink a month    Drug use: No    Sexual activity: Yes     Partners: Female     Birth control/protection: None      Review of Systems   Constitutional:  Negative for fatigue and fever.   HENT:  Negative for rhinorrhea and sore throat.    Eyes:  Negative for photophobia and visual disturbance.   Respiratory:  Positive for cough and shortness of breath. Negative for chest tightness.    Cardiovascular:  Positive for chest pain. Negative for palpitations.   Gastrointestinal:  Negative for abdominal pain, nausea and vomiting.   Genitourinary:  Negative for dysuria and flank pain.   Musculoskeletal:  Negative for back pain and neck pain.   Skin:  Negative for rash.   Neurological:  Negative for weakness and headaches.   Hematological:  Does not bruise/bleed easily.   Psychiatric/Behavioral:  Negative for confusion and decreased concentration.      Objective:     Vital Signs (Most Recent):  Temp: 99.2 °F (37.3 °C) (09/02/24 1926)  Pulse: 81 (09/02/24 2217)  Resp: (!) 30 (09/02/24 2217)  BP: (!) 208/100 (09/02/24 2217)  SpO2: 99 % (09/02/24 2217) Vital Signs (24h Range):  Temp:  [99.1 °F (37.3 °C)-99.2 °F (37.3 °C)] 99.2 °F (37.3 °C)  Pulse:  [72-86] 81  Resp:  [18-36] 30  SpO2:  [87 %-99 %] 99 %  BP: (143-208)/() 208/100   Weight: 73 kg (161 lb)  Body mass index is 25.22 kg/m².    No intake or output data in the 24 hours ending 09/02/24 2236       Physical Exam  Vitals and nursing note reviewed.   Constitutional:       Appearance: Normal appearance. He is not ill-appearing, toxic-appearing or diaphoretic.   HENT:      Head: Normocephalic and atraumatic.      Nose: Nose normal.   Eyes:      Extraocular Movements: Extraocular movements intact.   Cardiovascular:      Rate and Rhythm: Normal rate and regular rhythm.      Pulses: Normal pulses.   Pulmonary:      Effort: Respiratory distress present.      Comments: Decreased air entry globally with bibasillar crackles  Abdominal:       General: Abdomen is flat. There is no distension.      Palpations: Abdomen is soft.   Musculoskeletal:         General: No swelling or tenderness.      Cervical back: Normal range of motion.      Right lower leg: No edema.      Left lower leg: No edema.   Skin:     General: Skin is warm.      Capillary Refill: Capillary refill takes less than 2 seconds.      Coloration: Skin is not pale.      Findings: No erythema.   Neurological:      General: No focal deficit present.      Mental Status: He is alert and oriented to person, place, and time.   Psychiatric:         Mood and Affect: Mood normal.         Behavior: Behavior normal.            Vents:  Oxygen Concentration (%): 80 (09/02/24 2029)  Lines/Drains/Airways       Peripheral Intravenous Line  Duration                  Hemodialysis AV Fistula Right forearm -- days         Peripheral IV - Single Lumen 09/02/24 1223 20 G Anterior;Distal;Left Upper Arm <1 day                  Significant Labs:    CBC/Anemia Profile:  Recent Labs   Lab 09/02/24  1223 09/02/24 2031   WBC 14.01*  --    HGB 9.9*  --    HCT 32.3* 33*     --    MCV 96  --    RDW 16.1*  --         Chemistries:  Recent Labs   Lab 09/02/24  1330 09/02/24 2028    139   K 3.4* 4.1    104   CO2 24 23   BUN 26* 28*   CREATININE 6.0* 6.9*   CALCIUM 10.0 9.9   ALBUMIN 3.2* 3.0*   PROT 7.6 7.5   BILITOT 1.1* 1.3*   ALKPHOS 67 67   ALT 8* 8*   AST 11 11   MG  --  1.8       All pertinent labs within the past 24 hours have been reviewed.    Significant Imaging: I have reviewed all pertinent imaging results/findings within the past 24 hours.

## 2024-09-03 NOTE — ASSESSMENT & PLAN NOTE
Patient with known CAD; triple vessel disease. Presented to the ED with c/o chest pain, left sided radiates to the proximal left arm, worse with exertion, present at rest and relieved with Nitro.   - ECHO: 6/7/24     Left Ventricle: The left ventricle is mildly dilated. Moderately increased ventricular mass. Mildly increased wall thickness. There is moderate concentric hypertrophy. Regional wall motion abnormalities present. The following segments are hypokinetic: basal inferior, mid inferoseptal, mid inferolateral, apical septal, apical inferior and apex.. There is normal diastolic function.    Right Ventricle: Normal right ventricular cavity size. Wall thickness is normal. Systolic function is normal.    Left Atrium: Left atrium is severely dilated.    Right Atrium: Normal and compressed right atrial size.    Aortic Valve: The aortic valve is a trileaflet valve. There is moderate aortic valve sclerosis. There is moderate annular calcification present.    Mitral Valve: There is moderate mitral annular calcification present. There is mild regurgitation.    Tricuspid Valve: There is mild regurgitation.    Aorta: Aortic root is normal in size measuring 3.68 cm. Ascending aorta is mildly dilated measuring 3.89 cm.    Pulmonary Artery: The estimated pulmonary artery systolic pressure is 25 mmHg.    IVC/SVC: Elevated venous pressure at 15 mmHg.    Pericardium: There is a trivial circumferential effusion.    Cardiac Cath 11/28/23     There was three vessel coronary artery disease.    The Mid LAD lesion was 90% stenosed.    The Mid RCA lesion was 100% stenosed.    The Prox LAD lesion was 75% stenosed.    The pre-procedure left ventricular end diastolic pressure was 26.    There was diastolic dysfunction.    Interval worsening of mid LAD disease. Given diffuse nature of CAD will recommend medcial therapy and HD optimization to improve left sided filling pressures    The estimated blood loss was <50 mL.    Right Coronary  Artery  Mid RCA lesion was 100% stenosed. The lesion was previously treated using a drug-eluting stent and angioplasty.    Acute Marginal Branch  Collaterals  Acute Mrg filled by collaterals from Mid RCA.      Right Posterior Atrioventricular Artery  Collaterals  RPAV filled by collaterals from 1st Sept.      Recommendations at the time was medical management. With [facility] medical management. Risk factor reductions. ASA 81mg and Plavix for one year. Cardiac rehab referral and Statin therapy.  - EKG: No ischemic changes   - Elevated Trop likely T2MI in the setting of Hypertensive Emergency with an underling diffuse CAD     Plan   - Continue with DAPT. ASA 81 and plavix 75mg qd  - Continue with Atorvastatin 80 mg HS   - Consider resuming Isosorbide mononitrate 120 mg qd   - Continue with coreg and nifedipine   - Continue with nitro sub-lingual   - Pt will benefit from Gen Cardiology follow up upon discharge for symptomatic treatment and closer monitoring   - BP goal 130/80 LDL 70  - Will continue to monitor      9/3/24:  Patient symptoms drastically improved following Dialysis. He was dialyzed on 9/2.     Echo report (9/3/2024)    Left Ventricle: The left ventricle is normal in size. Moderately increased ventricular mass. Moderately increased wall thickness. There is moderate concentric hypertrophy. Regional wall motion abnormalities present. Septal motion is abnormal. There is low normal systolic function with a visually estimated ejection fraction of 50 - 55%. Biplane (2D) method of discs ejection fraction is 45%. Global longitudinal strain is -11.0%. Grade II diastolic dysfunction.    Right Ventricle: Normal right ventricular cavity size. There is hypertrophy. Systolic function is normal.    Left Atrium: Left atrium is severely dilated.    Aortic Valve: There is moderate aortic valve sclerosis. There is mild annular calcification present. There is mild stenosis. Aortic valve area by VTI is 1.69 cm². Aortic valve  "peak velocity is 2.21 m/s. Mean gradient is 12 mmHg. The dimensionless index is 0.53.    Mitral Valve: There is bileaflet sclerosis. There is mild regurgitation.    Pulmonary Artery: The estimated pulmonary artery systolic pressure is 22 mmHg.    IVC/SVC: Normal venous pressure at 3 mmHg.    Pericardium: There is a trivial circumferential effusion.     Vitals    Height Weight BMI (Calculated) BSA (Calculated - sq m) BP Pulse   5' 7" (1.702 m) 69.4 kg (153 lb) 24 1.81 sq meters 146/75 69     Study Details A complete echo was performed using complete 2D, color flow Doppler and spectral Doppler. During the study, the apical, parasternal and subcostal views were captured. This was a portable study performed at the patient's bedside.     Echocardiography Findings    Left Ventricle The left ventricle is normal in size. Moderately increased ventricular mass. Moderately increased wall thickness. There is moderate concentric hypertrophy. Regional wall motion abnormalities present. Septal motion is abnormal. There is low normal systolic function with a visually estimated ejection fraction of 50 - 55%. Biplane (2D) method of discs ejection fraction is 45%. Global longitudinal strain is -11.0%. Grade II diastolic dysfunction.   Right Ventricle Normal right ventricular cavity size. There is hypertrophy. Systolic function is normal.   Left Atrium Left atrium is severely dilated.   Right Atrium Normal right atrial size.   Aortic Valve There is moderate aortic valve sclerosis. There is mild annular calcification present. There is normal leaflet mobility. There is mild stenosis. Aortic valve area by VTI is 1.69 cm². Aortic valve peak velocity is 2.21 m/s. Mean gradient is 12 mmHg. The dimensionless index is 0.53.   Mitral Valve There is bileaflet sclerosis. There is normal leaflet mobility. There is mild regurgitation.   Tricuspid Valve The tricuspid valve is structurally normal. There is normal leaflet mobility.   Pulmonic Valve " The pulmonic valve is structurally normal.   IVC/SVC Normal venous pressure at 3 mmHg.   Pericardium and Other Findings There is a trivial circumferential effusion.   Pulmonary Artery The estimated pulmonary artery systolic pressure is 22 mmHg.     PLAN:  - Continue with DAPT. ASA 81 and plavix 75mg qd  - Continue with Atorvastatin 80 mg HS   - Continue Isosorbide mononitrate 120 mg qd   - Continue with coreg and nifedipine (- BP goal 130/80 LDL 70)  - Continue with nitro sub-lingual PRN  - Pt will benefit from Gen Cardiology follow up upon discharge for symptomatic treatment and closer monitoring.  - Signing off today on this consult

## 2024-09-03 NOTE — ASSESSMENT & PLAN NOTE
Patient with known CAD; triple vessel disease. Presented to the ED with c/o chest pain, left sided radiates to the proximal left arm, worse with exertion, present at rest and relieved with Nitro.   - ECHO: 6/7/24     Left Ventricle: The left ventricle is mildly dilated. Moderately increased ventricular mass. Mildly increased wall thickness. There is moderate concentric hypertrophy. Regional wall motion abnormalities present. The following segments are hypokinetic: basal inferior, mid inferoseptal, mid inferolateral, apical septal, apical inferior and apex.. There is normal diastolic function.    Right Ventricle: Normal right ventricular cavity size. Wall thickness is normal. Systolic function is normal.    Left Atrium: Left atrium is severely dilated.    Right Atrium: Normal and compressed right atrial size.    Aortic Valve: The aortic valve is a trileaflet valve. There is moderate aortic valve sclerosis. There is moderate annular calcification present.    Mitral Valve: There is moderate mitral annular calcification present. There is mild regurgitation.    Tricuspid Valve: There is mild regurgitation.    Aorta: Aortic root is normal in size measuring 3.68 cm. Ascending aorta is mildly dilated measuring 3.89 cm.    Pulmonary Artery: The estimated pulmonary artery systolic pressure is 25 mmHg.    IVC/SVC: Elevated venous pressure at 15 mmHg.    Pericardium: There is a trivial circumferential effusion.    Cardiac Cath 11/28/23     There was three vessel coronary artery disease.    The Mid LAD lesion was 90% stenosed.    The Mid RCA lesion was 100% stenosed.    The Prox LAD lesion was 75% stenosed.    The pre-procedure left ventricular end diastolic pressure was 26.    There was diastolic dysfunction.    Interval worsening of mid LAD disease. Given diffuse nature of CAD will recommend medcial therapy and HD optimization to improve left sided filling pressures    The estimated blood loss was <50 mL.    Right Coronary  Artery  Mid RCA lesion was 100% stenosed. The lesion was previously treated using a drug-eluting stent and angioplasty.    Acute Marginal Branch  Collaterals  Acute Mrg filled by collaterals from Mid RCA.      Right Posterior Atrioventricular Artery  Collaterals  RPAV filled by collaterals from 1st Sept.      Recommendations at the time was medical management. With [facility] medical management. Risk factor reductions. ASA 81mg and Plavix for one year. Cardiac rehab referral and Statin therapy.  - EKG: No ischemic changes   - Elevated Trop likely T2MI in the setting of Hypertensive Emergency with an underling diffuse CAD     Plan   - Continue with DAPT. ASA 81 and plavix 75mg qd  - Continue with Atorvastatin 80 mg HS   - Consider resuming Isosorbide mononitrate 120 mg qd   - Continue with coreg and nifedipine   - Continue with nitro sub-lingual   - Pt will benefit from Gen Cardiology follow up upon discharge for symptomatic treatment and closer monitoring   - BP goal 130/80 LDL 70  - Will continue to monitor

## 2024-09-03 NOTE — SUBJECTIVE & OBJECTIVE
Interval History/Significant Events: NAEON. Patient received dialysis and feels much better this AM. Able to wean off HFNC to NC. Will stepdown today.       Past Medical History:   Diagnosis Date    CAD (coronary artery disease), native coronary artery 11/21/2019    Cataract     Diabetes mellitus     Diabetic retinopathy     Dialysis patient     DM type 2 causing renal disease, not at goal     ESRD (end stage renal disease) started dialysis 01/2014 06/05/2014    History of colon polyps 02/10/2021    History of COVID-19 12/29/2022    Hyperparathyroidism, secondary renal 06/05/2014    Hypertension     NSTEMI (non-ST elevated myocardial infarction) 12/21/2013    Organ transplant candidate 06/05/2014    Pleural effusion 06/07/2024    Pneumonia     Post PTCA 08/26/2020    RCA [facility] 7-25-20    Renal cell carcinoma of right kidney     Renal hypertension     Stroke        Past Surgical History:   Procedure Laterality Date    ANGIOGRAM, CORONARY, WITH LEFT HEART CATHETERIZATION N/A 7/1/2021    Procedure: Angiogram, Coronary, with Left Heart Cath;  Surgeon: Miki Zendejas MD;  Location: [facility] CATH LAB;  Service: Cardiology;  Laterality: N/A;    ANGIOGRAM, CORONARY, WITH LEFT HEART CATHETERIZATION N/A 11/28/2023    Procedure: Angiogram, Coronary, with Left Heart Cath;  Surgeon: Noah Pendleton MD;  Location: [facility] CATH LAB;  Service: Cardiology;  Laterality: N/A;    COLONOSCOPY N/A 5/3/2017    Procedure: COLONOSCOPY;  Surgeon: Rufino Carpenter MD;  Location: [address]);  Service: Endoscopy;  Laterality: N/A;  pt states can only schedule on Wednesdays    COLONOSCOPY N/A 2/9/2021    Procedure: COLONOSCOPY;  Surgeon: Edil Wong MD;  Location: [facility] ENDO ([address] [place] [address]);  Service: Endoscopy;  Laterality: N/A;  Dialysis MWF/ labwork day of procedure  right arm aceess  per Dr. Colin pt can hold Plavix 5 days prior see note- sm  COVID test on 2/6/21 at [facility] - sm    COLONOSCOPY N/A 2/10/2021    Procedure: COLONOSCOPY;  Surgeon:  Robert Ayers MD;  Location: [facility] ENDO (4TH FLR);  Service: Endoscopy;  Laterality: N/A;  rescheduled due to poor bowel prep-BB  negative covid screening 2/6/21-BB  dialysis M-W-F-BB  okay to r/s for 2/9/21 and to hold Plavix per Dr. KIRAN Wong-BB  labs same day-BB    CORONARY STENT PLACEMENT N/A 7/25/2019    Procedure: INSERTION, STENT, CORONARY ARTERY;  Surgeon: Miki Zendejas MD;  Location: [facility] CATH LAB;  Service: Cardiology;  Laterality: N/A;    DIALYSIS FISTULA CREATION      FISTULOGRAM N/A 2/18/2019    Procedure: Fistulogram;  Surgeon: Yaneth De La Cruz MD;  Location: [facility] CATH LAB;  Service: Cardiology;  Laterality: N/A;    FISTULOGRAM Right 7/23/2019    Procedure: Fistulogram;  Surgeon: Oz Cordoba MD;  Location: [facility] CATH LAB;  Service: Cardiology;  Laterality: Right;    LAPAROSCOPIC ROBOT-ASSISTED SURGICAL REMOVAL OF KIDNEY USING DA JAIME XI Right 5/19/2022    Procedure: XI ROBOTIC NEPHRECTOMY;  Surgeon: Aidan Hendricks MD;  Location: [facility] OR 2ND FLR;  Service: Urology;  Laterality: Right;  3hrs    LAPAROSCOPIC ROBOT-ASSISTED SURGICAL REMOVAL OF KIDNEY USING DA JAIME XI Left 1/5/2023    Procedure: XI ROBOTIC NEPHRECTOMY;  Surgeon: Aidan Hendricks MD;  Location: [facility] OR 2ND FLR;  Service: Urology;  Laterality: Left;  4 hrs    LEFT HEART CATHETERIZATION Left 7/25/2019    Procedure: Left heart cath;  Surgeon: Miki Zendejas MD;  Location: [facility] CATH LAB;  Service: Cardiology;  Laterality: Left;    REPAIR  1/5/2023    Procedure: REPAIR; COLOTOMY;  Surgeon: Maikel Lamb MD;  Location: [facility] OR 2ND FLR;  Service: General;;    RETINAL LASER PROCEDURE Bilateral 2018 or 2017    Dr. Rothman    VASCULAR SURGERY         Review of patient's allergies indicates:   Allergen Reactions    No known allergies        Family History       Problem Relation (Age of Onset)    Cancer Maternal Grandfather    Cataracts Mother    Diabetes Mother    Heart disease Brother    Hypertension Mother, Father,  Sister, Brother    Kidney disease Brother          Tobacco Use    Smoking status: Never    Smokeless tobacco: Never   Substance and Sexual Activity    Alcohol use: Not Currently     Comment: One drink a month    Drug use: No    Sexual activity: Yes     Partners: Female     Birth control/protection: None      Review of Systems   Constitutional:  Negative for fatigue and fever.   HENT:  Negative for rhinorrhea and sore throat.    Eyes:  Negative for photophobia and visual disturbance.   Respiratory:  Positive for cough. Negative for chest tightness and shortness of breath.    Cardiovascular:  Negative for chest pain and palpitations.   Gastrointestinal:  Negative for abdominal pain, nausea and vomiting.   Genitourinary:  Negative for dysuria and flank pain.   Musculoskeletal:  Negative for back pain and neck pain.   Skin:  Negative for rash.   Neurological:  Negative for weakness and headaches.   Hematological:  Does not bruise/bleed easily.   Psychiatric/Behavioral:  Negative for confusion and decreased concentration.      Objective:     Vital Signs (Most Recent):  Temp: 98.2 °F (36.8 °C) (09/03/24 0415)  Pulse: 63 (09/03/24 1141)  Resp: 20 (09/03/24 1141)  BP: (!) 146/75 (09/03/24 1000)  SpO2: 98 % (09/03/24 1141) Vital Signs (24h Range):  Temp:  [98.2 °F (36.8 °C)-100.4 °F (38 °C)] 98.2 °F (36.8 °C)  Pulse:  [63-86] 63  Resp:  [5-36] 20  SpO2:  [87 %-100 %] 98 %  BP: (144-208)/() 146/75   Weight: 69.4 kg (153 lb)  Body mass index is 23.96 kg/m².      Intake/Output Summary (Last 24 hours) at 9/3/2024 1302  Last data filed at 9/3/2024 0900  Gross per 24 hour   Intake 220 ml   Output 3500 ml   Net -3280 ml          Physical Exam  Vitals and nursing note reviewed.   Constitutional:       Appearance: Normal appearance. He is not ill-appearing, toxic-appearing or diaphoretic.   HENT:      Head: Normocephalic and atraumatic.      Nose: Nose normal.   Eyes:      Extraocular Movements: Extraocular movements intact.    Cardiovascular:      Rate and Rhythm: Normal rate and regular rhythm.      Pulses: Normal pulses.   Pulmonary:      Effort: Pulmonary effort is normal.      Breath sounds: Normal breath sounds.      Comments: On NC  Abdominal:      General: Abdomen is flat. There is no distension.      Palpations: Abdomen is soft.   Musculoskeletal:         General: No swelling or tenderness.      Cervical back: Normal range of motion.      Right lower leg: No edema.      Left lower leg: No edema.   Skin:     General: Skin is warm.      Capillary Refill: Capillary refill takes less than 2 seconds.      Coloration: Skin is not pale.      Findings: No erythema.   Neurological:      General: No focal deficit present.      Mental Status: He is alert and oriented to person, place, and time.   Psychiatric:         Mood and Affect: Mood normal.         Behavior: Behavior normal.            Vents:  Oxygen Concentration (%): 50 (09/03/24 0900)  Lines/Drains/Airways       Peripheral Intravenous Line  Duration                  Hemodialysis AV Fistula Right forearm -- days         Peripheral IV - Single Lumen 09/02/24 1223 20 G Anterior;Distal;Left Upper Arm 1 day                  Significant Labs:    CBC/Anemia Profile:  Recent Labs   Lab 09/02/24  1223 09/02/24 2031 09/03/24  0407   WBC 14.01*  --  14.26*   HGB 9.9*  --  11.0*   HCT 32.3* 33* 35.0*     --  326   MCV 96  --  94   RDW 16.1*  --  15.9*        Chemistries:  Recent Labs   Lab 09/02/24  1330 09/02/24 2028 09/03/24  0407    139 137   K 3.4* 4.1 3.6    104 103   CO2 24 23 23   BUN 26* 28* 17   CREATININE 6.0* 6.9* 4.1*   CALCIUM 10.0 9.9 10.4   ALBUMIN 3.2* 3.0* 3.5   PROT 7.6 7.5 9.0*   BILITOT 1.1* 1.3* 1.8*   ALKPHOS 67 67 77   ALT 8* 8* 10   AST 11 11 12   MG  --  1.8 2.0   PHOS  --   --  2.1*       All pertinent labs within the past 24 hours have been reviewed.    Significant Imaging: I have reviewed all pertinent imaging results/findings within the past  24 hours.

## 2024-09-03 NOTE — ASSESSMENT & PLAN NOTE
Patient has a current diagnosis of Hypertensive emergency with end organ damage evidenced by acute pulmonary edema without heart failure which is controlled.  Latest blood pressure and vitals reviewed-   Temp:  [98.9 °F (37.2 °C)-100.4 °F (38 °C)]   Pulse:  [67-86]   Resp:  [5-36]   BP: (143-208)/()   SpO2:  [87 %-100 %] .   Patient currently on antihypertensives.   Home meds for hypertension were reviewed and noted below.   Hypertension Medications               amLODIPine (NORVASC) 10 MG tablet Take 10 mg by mouth.    carvediloL (COREG) 25 MG tablet Take 2 tablets (50 mg total) by mouth 2 (two) times daily with meals.    hydrALAZINE (APRESOLINE) 100 MG tablet Take 1 tablet (100 mg total) by mouth every 12 (twelve) hours.    isosorbide mononitrate (IMDUR) 120 MG 24 hr tablet Take 1 tablet (120 mg total) by mouth once daily.    isosorbide mononitrate (IMDUR) 60 MG 24 hr tablet Take 1 tablet by mouth once daily.    minoxidiL (LONITEN) 2.5 MG tablet Take 2 tablets (5 mg total) by mouth 2 (two) times daily.    NIFEdipine (PROCARDIA-XL) 90 MG (OSM) 24 hr tablet Take 1 tablet (90 mg total) by mouth once daily.    nitroGLYCERIN (NITROSTAT) 0.4 MG SL tablet Place 1 tablet (0.4 mg total) under the tongue every 5 (five) minutes as needed for Chest pain.          - Pt presented with Hypertensive emergency with flash pulmonary edema.   - Although reported medication compliance and HD schedule. Presented with chest pain, high BP and elevated troponin  - EKG: Notable for significant LVH with repolarization abnormalities. Notable Left atrial enlargement HR 80  NSR  - CXR: Pulmonary findings suggest congestive change or edema. No large focal consolidation   - Currently on HD and High flow NC     Plan   - Continue with Nifedipine 90 mg  - Continue with Coreg 50 mg BID   - Consider starting Hydralazine 100 mg BID   - Continue to monitor BP   - Follow up with Nephrology recommendations.

## 2024-09-03 NOTE — CONSULTS
[facility] - Cardiac Medical ICU  Nephrology  Consult Note    Patient Name: Haroldo Dickinson  MRN: [id number]  Admission Date: 9/2/2024  Hospital Length of Stay: 0 days  Attending Provider: Denver Manuel*   Primary Care Physician: Mireya Larose MD  Principal Problem:Acute hypoxemic respiratory failure    Inpatient consult to Nephrology  Consult performed by: Gabriella Guerrero MD  Consult ordered by: Pieter Ocasio MD  Reason for consult: hd        Subjective:     HPI: 48-year-old male with a past medical history of HTN compilcated by CAD (s/p HILLARY to RCA in 2020), HFpEF, ESRD on dialysis (MWF), previous RCC (s/p bilateral nephrectomies), T2DM, and hx of CVA (w/ residual R-sided deficits) who presented to [facility] on 9/2/24 with chest pain. Patient reported a tingling sensation in his left upper chest and left arm which is worse while lying flat and improves with sitting forward. Additionally patient reported associated difficulty breathing and cough. Patient last completed dialysis on the morning prior to arriving in the ED. Patient endorsed that he experiences these symptoms when he is fluid overloaded and requires dialysis.     ED Course  In the emergency department, /83, HR 72, and sPO2 95% on HFNC. Workup consistent with leukocytosis (WBC 14), CMP consistent with ESRD and stable electrolytes, BNP 1900 (increased from baseline), and trop #1 0.095 and trop #2 0.1(though improved from baseline 12). EKG NSR w/o ST depressions or elevations. CXR with significant pulmonary oedema.    Patient initially admitted to  for HD.   Nephrology consulted for HD    Past Medical History:   Diagnosis Date    CAD (coronary artery disease), native coronary artery 11/21/2019    Cataract     Diabetes mellitus     Diabetic retinopathy     Dialysis patient     DM type 2 causing renal disease, not at goal     ESRD (end stage renal disease) started dialysis 01/2014 06/05/2014    History of colon polyps 02/10/2021    History of  COVID-19 12/29/2022    Hyperparathyroidism, secondary renal 06/05/2014    Hypertension     NSTEMI (non-ST elevated myocardial infarction) 12/21/2013    Organ transplant candidate 06/05/2014    Pleural effusion 06/07/2024    Pneumonia     Post PTCA 08/26/2020    RCA HILLARY 7-25-20    Renal cell carcinoma of right kidney     Renal hypertension     Stroke        Past Surgical History:   Procedure Laterality Date    ANGIOGRAM, CORONARY, WITH LEFT HEART CATHETERIZATION N/A 7/1/2021    Procedure: Angiogram, Coronary, with Left Heart Cath;  Surgeon: Miki Zendejas MD;  Location: [facility] CATH LAB;  Service: Cardiology;  Laterality: N/A;    ANGIOGRAM, CORONARY, WITH LEFT HEART CATHETERIZATION N/A 11/28/2023    Procedure: Angiogram, Coronary, with Left Heart Cath;  Surgeon: Noah Pendleton MD;  Location: [facility] CATH LAB;  Service: Cardiology;  Laterality: N/A;    COLONOSCOPY N/A 5/3/2017    Procedure: COLONOSCOPY;  Surgeon: Rufino Carpenter MD;  Location: [facility] (4TH FLR);  Service: Endoscopy;  Laterality: N/A;  pt states can only schedule on Wednesdays    COLONOSCOPY N/A 2/9/2021    Procedure: COLONOSCOPY;  Surgeon: Edil Wong MD;  Location: [facility] ENDO (4TH FLR);  Service: Endoscopy;  Laterality: N/A;  Dialysis MWF/ labwork day of procedure  right arm aceess  per Dr. Colin pt can hold Plavix 5 days prior see note- sm  COVID test on 2/6/21 at [facility] - sm    COLONOSCOPY N/A 2/10/2021    Procedure: COLONOSCOPY;  Surgeon: Robert Ayers MD;  Location: [facility] (4TH FLR);  Service: Endoscopy;  Laterality: N/A;  rescheduled due to poor bowel prep-BB  negative covid screening 2/6/21-BB  dialysis M-W-F-BB  okay to r/s for 2/9/21 and to hold Plavix per Dr. KIRAN Wong-BB  labs same day-BB    CORONARY STENT PLACEMENT N/A 7/25/2019    Procedure: INSERTION, STENT, CORONARY ARTERY;  Surgeon: Miki Zendejas MD;  Location: [facility] CATH LAB;  Service: Cardiology;  Laterality: N/A;    DIALYSIS FISTULA CREATION      FISTULOGRAM N/A 2/18/2019     Procedure: Fistulogram;  Surgeon: Yaneth De La Cruz MD;  Location: [facility] CATH LAB;  Service: Cardiology;  Laterality: N/A;    FISTULOGRAM Right 7/23/2019    Procedure: Fistulogram;  Surgeon: Oz Cordoba MD;  Location: [facility] CATH LAB;  Service: Cardiology;  Laterality: Right;    LAPAROSCOPIC ROBOT-ASSISTED SURGICAL REMOVAL OF KIDNEY USING DA JAIME XI Right 5/19/2022    Procedure: XI ROBOTIC NEPHRECTOMY;  Surgeon: Aidan Hendricks MD;  Location: [address] FLR;  Service: Urology;  Laterality: Right;  3hrs    LAPAROSCOPIC ROBOT-ASSISTED SURGICAL REMOVAL OF KIDNEY USING DA JAIME XI Left 1/5/2023    Procedure: XI ROBOTIC NEPHRECTOMY;  Surgeon: Aidan Hendricks MD;  [facility];  Service: Urology;  Laterality: Left;  4 hrs    LEFT HEART CATHETERIZATION Left 7/25/2019    Procedure: Left heart cath;  Surgeon: Miki Zendejas MD;  Location: [facility] CATH LAB;  Service: Cardiology;  Laterality: Left;    REPAIR  1/5/2023    Procedure: REPAIR; COLOTOMY;  Surgeon: Maikel Lamb MD;  [address]  Service: General;;    RETINAL LASER PROCEDURE Bilateral 2018 or 2017    Dr. Rothman    VASCULAR SURGERY         Review of patient's allergies indicates:   Allergen Reactions    No known allergies      Current Facility-Administered Medications   Medication Frequency    acetaminophen tablet 650 mg Q6H PRN    albuterol-ipratropium 2.5 mg-0.5 mg/3 mL nebulizer solution 3 mL Q4H    aspirin EC tablet 81 mg Daily    atorvastatin tablet 80 mg Daily    benzonatate capsule 100 mg TID PRN    carvediloL tablet 50 mg BID WM    cinacalcet tablet 90 mg Daily with breakfast    clopidogreL tablet 75 mg Daily    dextrose 10% bolus 125 mL 125 mL PRN    dextrose 10% bolus 250 mL 250 mL PRN    ezetimibe tablet 10 mg Daily    glucagon (human recombinant) injection 1 mg PRN    glucose chewable tablet 16 g PRN    glucose chewable tablet 24 g PRN    guaiFENesin 100 mg/5 ml syrup 200 mg Q4H PRN    heparin (porcine)  "injection 5,000 Units Q12H    insulin aspart U-100 pen 0-5 Units QID (AC + HS) PRN    melatonin tablet 6 mg Nightly PRN    NIFEdipine 24 hr tablet 90 mg Daily    nitroGLYCERIN SL tablet 0.4 mg Q5 Min PRN    polyethylene glycol packet 17 g Daily    sevelamer carbonate tablet 2,400 mg TID WM    sodium chloride 0.9% flush 10 mL PRN    sodium chloride 0.9% flush 10 mL PRN     Facility-Administered Medications Ordered in Other Encounters   Medication Frequency    lidocaine (PF) 10 mg/ml (1%) injection 10 mg Once     Family History       Problem Relation (Age of Onset)    Cancer Maternal Grandfather    Cataracts Mother    Diabetes Mother    Heart disease Brother    Hypertension Mother, Father, Sister, Brother    Kidney disease Brother          Tobacco Use    Smoking status: Never    Smokeless tobacco: Never   Substance and Sexual Activity    Alcohol use: Not Currently     Comment: One drink a month    Drug use: No    Sexual activity: Yes     Partners: Female     Birth control/protection: None     Review of Systems  Objective:     Vital Signs (Most Recent):  Temp: 98.2 °F (36.8 °C) (09/03/24 0415)  Pulse: 67 (09/03/24 0600)  Resp: (!) 22 (09/03/24 0600)  BP: (!) 148/73 (09/03/24 0600)  SpO2: 97 % (09/03/24 0600) Vital Signs (24h Range):  Temp:  [98.2 °F (36.8 °C)-100.4 °F (38 °C)] 98.2 °F (36.8 °C)  Pulse:  [65-86] 67  Resp:  [5-36] 22  SpO2:  [87 %-100 %] 97 %  BP: (143-208)/() 148/73     Weight: 69.4 kg (153 lb) (09/03/24 0052)  Body mass index is 23.96 kg/m².  Body surface area is 1.81 meters squared.    I/O last 3 completed shifts:  In: 120 [P.O.:120]  Out: 3500 [Other:3500]     Physical Exam     Significant Labs:  ABGs:   Recent Labs   Lab 09/02/24 2031   PH 7.473*   PCO2 37.1   HCO3 27.2   POCSATURATED 96   BE 4*     BMP:   Recent Labs   Lab 09/03/24 0407   GLU 82      K 3.6      CO2 23   BUN 17   CREATININE 4.1*   CALCIUM 10.4   MG 2.0     Cardiac Markers: No results for input(s): "CKMB", " ""TROPONINT", "MYOGLOBIN" in the last 168 hours.  CBC:   Recent Labs   Lab 09/03/24  0407   WBC 14.26*   RBC 3.71*   HGB 11.0*   HCT 35.0*      MCV 94   MCH 29.6   MCHC 31.4*     CMP:   Recent Labs   Lab 09/03/24  0407   GLU 82   CALCIUM 10.4   ALBUMIN 3.5   PROT 9.0*      K 3.6   CO2 23      BUN 17   CREATININE 4.1*   ALKPHOS 77   ALT 10   AST 12   BILITOT 1.8*     All labs within the past 24 hours have been reviewed.    Assessment/Plan:     Renal/  ESRD on hemodialysis  48 y.o. Black or  Male ESRD-HD  presents to ED on 9/2/2024 with diagnosis of: Chest discomfort [R07.89];Hypoxia [R09.02];NSTEMI (non-ST elevated myocardial infarction) [I21.4];Chest pain [R07.9]   Nephrology consulted for inpatient ESRD-HD management    Last HD 09/02 still volume overloaded     Assessment:   - Will provide dialysis today  with UF 3L as BP tolerates for metabolic clearance and volume management , FOR 2 HRS  - Pre and Post-HD weights   - Continue to monitor intake and output      Anemia of ESRD   Recent Labs   Lab 08/28/24  0000 09/02/24  1223 09/02/24 2031 09/03/24  0407   WBC  --  14.01*  --  14.26*   HGB 9.2* 9.9*  --  11.0*   HCT  --  32.3* 33* 35.0*   PLT  --  300  --  326     Lab Results   Component Value Date    FESATURATED 66 (H) 11/27/2023    FERRITIN 1,531 (H) 11/27/2023       - Goal in ESRD is Hgb of 10-11.     Mineral Bone Disease in ESRD   Lab Results   Component Value Date     (H) 08/14/2024    CALCIUM 10.4 09/03/2024    ALBUMIN 3.5 09/03/2024    PHOS 2.1 (L) 09/03/2024            Thank you for your consult. I will follow-up with patient. Please contact us if you have any additional questions.    Gabriella Guerrero MD  Nephrology  [facility] - Cardiac Medical ICU  "

## 2024-09-03 NOTE — HOSPITAL COURSE
Haroldo Dickinson with a history of HTN compilcated by CAD (s/p HILLARY to RCA in 2020), HFpEF, ESRD on dialysis (MWF), previous RCC (s/p bilateral nephrectomies), T2DM, and hx of CVA (w/ residual R-sided deficits) admitted to the MICU secondary to fluid overload and BP of 201/104. Patient received dialysis overnight symptoms improved significantly. Cardiology did not think his symptoms were ACS related and recommended continuing home medications. He was weaned off HFNC to NC the day after dialysis. 9/4, patient doing well, still on NC at 2L. Planned for dialysis this AM and will discharge afterwards.    Vitals:    09/04/24 1133 09/04/24 1201 09/04/24 1230 09/04/24 1245   BP:  130/66 115/63 117/69   BP Location:  Left arm     Patient Position:  Lying     Pulse: 64 63 65 69   Resp: 19 15 11 (!) 23   Temp:       TempSrc:       SpO2: 100% 97% (!) 91% (!) 92%   Weight:       Height:           Physical Exam  Vitals and nursing note reviewed.   Constitutional:       Appearance: Normal appearance. He is not ill-appearing, toxic-appearing or diaphoretic.   HENT:      Head: Normocephalic and atraumatic.      Nose: Nose normal.   Eyes:      Extraocular Movements: Extraocular movements intact.   Cardiovascular:      Rate and Rhythm: Normal rate and regular rhythm.      Pulses: Normal pulses.   Pulmonary:      Effort: Pulmonary effort is normal.      Breath sounds: Normal breath sounds.      Comments: On NC  Abdominal:      General: Abdomen is flat. There is no distension.      Palpations: Abdomen is soft.   Musculoskeletal:         General: No swelling or tenderness.      Cervical back: Normal range of motion.      Right lower leg: No edema.      Left lower leg: No edema.   Skin:     General: Skin is warm.      Capillary Refill: Capillary refill takes less than 2 seconds.      Coloration: Skin is not pale.      Findings: No erythema.   Neurological:      General: No focal deficit present.      Mental Status: He is alert and oriented  to person, place, and time.   Psychiatric:         Mood and Affect: Mood normal.         Behavior: Behavior normal.

## 2024-09-03 NOTE — H&P
[facility] Emergency Dept  Hospital Medicine  History & Physical    Patient Name: Haroldo Dickinson  MRN: [id number]  Patient Class: OP- Observation  Admission Date: 9/2/2024  Attending Physician: Juanito Crawley, *   Primary Care Provider: Mireya Larose MD         Patient information was obtained from patient, past medical records, and ER records.     Subjective:     Principal Problem:NSTEMI (non-ST elevated myocardial infarction)    Chief Complaint:   Chief Complaint   Patient presents with    Chest Pain     Has stents        HPI: 49yo M with PMHx of ESRD on HD MWF (received HD on day of admission), RCC s/p bilateral nephrectomies, CAD (s/p HILLARY of RCA 2020) with coronary angio in Nov 2023 with three vessel disease (LAD 90%, %, LAD 75%), HTN, T2DM, CVA w/ residual R-sided deficits who presented with sharp central chest pain radiating to the L arm for the last 2 nights (since 8/31). Pt took home nitroglycerin with partial relief 6/10 -> 3/10. Notes he had one episode of associated nausea and vomiting during the first episode of chest pain. He has also developed a non-productive cough around the same time with associated shortness of breath. No known sick contacts. Denies fevers/chills. He is sleeping with more pillows at night but denies ankle/leg swelling. He does not make urine.     In ED, hemodynamically stable but hypertensive to the 170's systolic. WBC 14 (ANC 11.1), K 3.4, BUN 26, Cr 6.0, bilirubin 1.1. Troponin 0.095 -> 0.101. CXR suspicious for pulmonary volume overload as well as left pleural effusion with likely associated compressive atelectasis.           Past Medical History:   Diagnosis Date    CAD (coronary artery disease), native coronary artery 11/21/2019    Cataract     Diabetes mellitus     Diabetic retinopathy     Dialysis patient     DM type 2 causing renal disease, not at goal     ESRD (end stage renal disease) started dialysis 01/2014 06/05/2014    History of colon polyps 02/10/2021     History of COVID-19 12/29/2022    Hyperparathyroidism, secondary renal 06/05/2014    Hypertension     NSTEMI (non-ST elevated myocardial infarction) 12/21/2013    Organ transplant candidate 06/05/2014    Pleural effusion 06/07/2024    Pneumonia     Post PTCA 08/26/2020    RCA HILLARY 7-25-20    Renal cell carcinoma of right kidney     Renal hypertension     Stroke        Past Surgical History:   Procedure Laterality Date    ANGIOGRAM, CORONARY, WITH LEFT HEART CATHETERIZATION N/A 7/1/2021    Procedure: Angiogram, Coronary, with Left Heart Cath;  Surgeon: Miki Zendejas MD;  Location: [facility] CATH LAB;  Service: Cardiology;  Laterality: N/A;    ANGIOGRAM, CORONARY, WITH LEFT HEART CATHETERIZATION N/A 11/28/2023    Procedure: Angiogram, Coronary, with Left Heart Cath;  Surgeon: Noah Pendleton MD;  Location: [facility] CATH LAB;  Service: Cardiology;  Laterality: N/A;    COLONOSCOPY N/A 5/3/2017    Procedure: COLONOSCOPY;  Surgeon: Rufino Carpenter MD;  Location: [facility] (4TH FLR);  Service: Endoscopy;  Laterality: N/A;  pt states can only schedule on Wednesdays    COLONOSCOPY N/A 2/9/2021    Procedure: COLONOSCOPY;  Surgeon: Edil Wong MD;  Location: [facility] (4TH FLR);  Service: Endoscopy;  Laterality: N/A;  Dialysis MWF/ labwork day of procedure  right arm aceess  per Dr. Colin pt can hold Plavix 5 days prior see note- sm  COVID test on 2/6/21 at [facility] - sm    COLONOSCOPY N/A 2/10/2021    Procedure: COLONOSCOPY;  Surgeon: Robert Ayers MD;  Location: [facility] (4TH FLR);  Service: Endoscopy;  Laterality: N/A;  rescheduled due to poor bowel prep-BB  negative covid screening 2/6/21-BB  dialysis M-W-F-BB  okay to r/s for 2/9/21 and to hold Plavix per Dr. KIRAN Wong-BB  labs same day-BB    CORONARY STENT PLACEMENT N/A 7/25/2019    Procedure: INSERTION, STENT, CORONARY ARTERY;  Surgeon: Miki Zendejas MD;  Location: [facility] CATH LAB;  Service: Cardiology;  Laterality: N/A;    DIALYSIS FISTULA CREATION      FISTULOGRAM  N/A 2/18/2019    Procedure: Fistulogram;  Surgeon: Yaneth De La Cruz MD;  Location: [facility] CATH LAB;  Service: Cardiology;  Laterality: N/A;    FISTULOGRAM Right 7/23/2019    Procedure: Fistulogram;  Surgeon: Oz Cordoba MD;  Location: [facility] CATH LAB;  Service: Cardiology;  Laterality: Right;    LAPAROSCOPIC ROBOT-ASSISTED SURGICAL REMOVAL OF KIDNEY USING DA JAIME XI Right 5/19/2022    Procedure: XI ROBOTIC NEPHRECTOMY;  Surgeon: Aidan Hendricks MD;  Location: [facility] OR [facility] FLR;  Service: Urology;  Laterality: Right;  3hrs    LAPAROSCOPIC ROBOT-ASSISTED SURGICAL REMOVAL OF KIDNEY USING DA JAIME XI Left 1/5/2023    Procedure: XI ROBOTIC NEPHRECTOMY;  Surgeon: Aidan Hendricks MD;  Location: [facility] OR [facility] FLR;  Service: Urology;  Laterality: Left;  4 hrs    LEFT HEART CATHETERIZATION Left 7/25/2019    Procedure: Left heart cath;  Surgeon: Miki Zendejas MD;  Location: [facility] CATH LAB;  Service: Cardiology;  Laterality: Left;    REPAIR  1/5/2023    Procedure: REPAIR; COLOTOMY;  Surgeon: Maikel Lamb MD;  [address];  Service: General;;    RETINAL LASER PROCEDURE Bilateral 2018 or 2017    Dr. Rothman    VASCULAR SURGERY         Review of patient's allergies indicates:   Allergen Reactions    No known allergies        Current Facility-Administered Medications on File Prior to Encounter   Medication    lidocaine (PF) 10 mg/ml (1%) injection 10 mg     Current Outpatient Medications on File Prior to Encounter   Medication Sig    aspirin (ECOTRIN) 81 MG EC tablet Take 1 tablet (81 mg total) by mouth once daily.    carvediloL (COREG) 25 MG tablet Take 2 tablets (50 mg total) by mouth 2 (two) times daily with meals.    hydrALAZINE (APRESOLINE) 100 MG tablet Take 1 tablet (100 mg total) by mouth every 12 (twelve) hours.    NIFEdipine (PROCARDIA-XL) 90 MG (OSM) 24 hr tablet Take 1 tablet (90 mg total) by mouth once daily.    nitroGLYCERIN (NITROSTAT) 0.4 MG SL tablet Place 1 tablet (0.4 mg total)  under the tongue every 5 (five) minutes as needed for Chest pain.    acetaminophen (TYLENOL) 650 MG TbSR Take 1 tablet (650 mg total) by mouth daily as needed (pain).    amLODIPine (NORVASC) 10 MG tablet Take 10 mg by mouth. (Patient not taking: Reported on 7/23/2024)    atorvastatin (LIPITOR) 80 MG tablet Take 1 tablet (80 mg total) by mouth once daily.    cinacalcet (SENSIPAR) 30 MG Tab Take by mouth.    clopidogreL (PLAVIX) 75 mg tablet Take 1 tablet (75 mg total) by mouth once daily.    epoetin philip (PROCRIT) 4,000 unit/mL injection Inject 1.93 mLs (7,720 Units total) into the skin every Mon, Wed, Fri.    ezetimibe (ZETIA) 10 mg tablet Take 1 tablet (10 mg total) by mouth once daily.    isosorbide mononitrate (IMDUR) 120 MG 24 hr tablet Take 1 tablet (120 mg total) by mouth once daily.    isosorbide mononitrate (IMDUR) 60 MG 24 hr tablet Take 1 tablet by mouth once daily. (Patient not taking: Reported on 7/23/2024)    melatonin (MELATIN) 3 mg tablet Take 1 tablet (3 mg total) by mouth nightly. (Patient not taking: Reported on 7/23/2024)    methoxy peg-epoetin beta (MIRCERA INJ) 150 mcg.    methoxy peg-epoetin beta (MIRCERA INJ) 100 mcg.    methoxy peg-epoetin beta (MIRCERA INJ) 75 mcg.    minoxidiL (LONITEN) 2.5 MG tablet Take 2 tablets (5 mg total) by mouth 2 (two) times daily.    ondansetron HCl (ZOFRAN ORAL) 4 mg.    polyethylene glycol (GLYCOLAX) 17 gram/dose powder Take 17 g by mouth daily as needed for Constipation.    sevelamer carbonate (RENVELA) 800 mg Tab Take 2 tablets (1,600 mg total) by mouth 3 (three) times daily with meals.    VELPHORO 500 mg Chew Take 2 tablets by mouth.    vit B complex-C-folic ac-zinc (RENAPLEX) 800 mcg- 12.5 mg Tab Take 1 tablet by mouth.     Family History       Problem Relation (Age of Onset)    Cancer Maternal Grandfather    Cataracts Mother    Diabetes Mother    Heart disease Brother    Hypertension Mother, Father, Sister, Brother    Kidney disease Brother          Tobacco  Use    Smoking status: Never    Smokeless tobacco: Never   Substance and Sexual Activity    Alcohol use: Not Currently     Comment: One drink a month    Drug use: No    Sexual activity: Yes     Partners: Female     Birth control/protection: None     Review of Systems   Constitutional:  Positive for fatigue. Negative for chills, diaphoresis and fever.   HENT:  Negative for rhinorrhea and sore throat.    Eyes:  Negative for photophobia, pain, redness and visual disturbance.   Respiratory:  Positive for cough and shortness of breath.    Cardiovascular:  Positive for chest pain. Negative for palpitations and leg swelling.   Gastrointestinal:  Positive for nausea and vomiting. Negative for abdominal pain and diarrhea.   Genitourinary:         Does not make urine   Musculoskeletal:  Negative for arthralgias and myalgias.   Skin:  Negative for rash and wound.   Neurological:  Negative for dizziness, tremors, weakness, numbness and headaches.   Psychiatric/Behavioral:  Negative for confusion.      Objective:     Vital Signs (Most Recent):  Temp: 99.1 °F (37.3 °C) (09/02/24 1642)  Pulse: 79 (09/02/24 1522)  Resp: 18 (09/02/24 1642)  BP: (!) 168/81 (09/02/24 1522)  SpO2: 96 % (09/02/24 1412) Vital Signs (24h Range):  Temp:  [99.1 °F (37.3 °C)] 99.1 °F (37.3 °C)  Pulse:  [72-79] 79  Resp:  [18-36] 18  SpO2:  [95 %-96 %] 96 %  BP: (143-179)/(80-86) 168/81     Weight: 73 kg (161 lb)  Body mass index is 25.22 kg/m².     Physical Exam  Vitals and nursing note reviewed.   Constitutional:       General: He is not in acute distress.     Appearance: Normal appearance. He is not ill-appearing or diaphoretic.   HENT:      Head: Normocephalic and atraumatic.      Right Ear: External ear normal.      Left Ear: External ear normal.      Nose: Nose normal. No rhinorrhea.   Eyes:      General: No scleral icterus.        Right eye: No discharge.         Left eye: No discharge.      Extraocular Movements: Extraocular movements intact.  "  Cardiovascular:      Rate and Rhythm: Normal rate and regular rhythm.      Pulses: Normal pulses.      Heart sounds: No murmur heard.  Pulmonary:      Effort: Pulmonary effort is normal. No respiratory distress.      Breath sounds: No wheezing.      Comments: Bibasilar crackles  Abdominal:      General: Abdomen is flat. There is no distension.      Palpations: Abdomen is soft.      Tenderness: There is no abdominal tenderness.   Musculoskeletal:      Cervical back: Normal range of motion and neck supple.      Right lower leg: No edema.      Left lower leg: No edema.   Skin:     General: Skin is warm and dry.   Neurological:      Mental Status: He is alert and oriented to person, place, and time.           Significant Labs: All pertinent labs within the past 24 hours have been reviewed.  CBC:   Recent Labs   Lab 09/02/24  1223   WBC 14.01*   HGB 9.9*   HCT 32.3*        CMP:   Recent Labs   Lab 09/02/24  1330      K 3.4*      CO2 24   *   BUN 26*   CREATININE 6.0*   CALCIUM 10.0   PROT 7.6   ALBUMIN 3.2*   BILITOT 1.1*   ALKPHOS 67   AST 11   ALT 8*   ANIONGAP 11     Cardiac Markers: No results for input(s): "CKMB", "MYOGLOBIN", "BNP", "TROPISTAT" in the last 48 hours.  Troponin:   Recent Labs   Lab 09/02/24  1223 09/02/24  1523   TROPONINI 0.095* 0.101*       Significant Imaging: I have reviewed all pertinent imaging results/findings within the past 24 hours.    Assessment/Plan:     * NSTEMI (non-ST elevated myocardial infarction)  Central chest pain radiating to L arm partially relieved by nitroglycerin, troponin 0.095 -> 0.101 in ED. Repeat 0.086. BNP 1.9k. CXR suggestive of pulmonary edema, L pleural effusion.       Ddx includes type 2 NSTEMI, acute compensated heart failure, viral pericarditis, GERD. Less likely PE, aortic dissection.     Plan:  - consult cardiology, appreciate recs. Recommend treating hypertension and following up troponin for now  - nephrology consulted for HD, " likely need to fluid removal  - trend troponin  - EKG if chest pain  - manage hypertension   - continue home coreg, nifedipine   - add back hydralazine if persistently hypertensive  - f/u echocardiogram, covid/flu      Coronary artery disease of native artery of native heart with stable angina pectoris  Patient with known CAD s/p stent placement, which is controlled Will continue ASA, Plavix, and Statin and monitor for S/Sx of angina/ACS. Continue to monitor on telemetry. s/p HILLARY of RCA 2020 with coronary angio in Nov 2023 with three vessel disease (LAD 90%, %, LAD 75%)    Plan:  - consult cardiology, appreciate recs  - management of HTN  - continue DAPT, statin  - PRN nitro        Essential hypertension  Chronic, uncontrolled. Latest blood pressure and vitals reviewed-     Temp:  [99.1 °F (37.3 °C)]   Pulse:  [72-86]   Resp:  [18-36]   BP: (143-181)/(80-91)   SpO2:  [95 %-97 %] .   Home meds for hypertension were reviewed and noted below.   Hypertension Medications               carvediloL (COREG) 25 MG tablet Take 2 tablets (50 mg total) by mouth 2 (two) times daily with meals.    hydrALAZINE (APRESOLINE) 100 MG tablet Take 1 tablet (100 mg total) by mouth every 12 (twelve) hours.    NIFEdipine (PROCARDIA-XL) 90 MG (OSM) 24 hr tablet Take 1 tablet (90 mg total) by mouth once daily.    nitroGLYCERIN (NITROSTAT) 0.4 MG SL tablet Place 1 tablet (0.4 mg total) under the tongue every 5 (five) minutes as needed for Chest pain.    amLODIPine (NORVASC) 10 MG tablet Take 10 mg by mouth.    isosorbide mononitrate (IMDUR) 120 MG 24 hr tablet Take 1 tablet (120 mg total) by mouth once daily.    isosorbide mononitrate (IMDUR) 60 MG 24 hr tablet Take 1 tablet by mouth once daily.    minoxidiL (LONITEN) 2.5 MG tablet Take 2 tablets (5 mg total) by mouth 2 (two) times daily.            While in the hospital, will manage blood pressure as follows; Adjust home antihypertensive regimen as follows-       - home nifedipine,  carvedilol   - add hydralazine after monitoring response to the above    Will utilize p.r.n. blood pressure medication only if patient's blood pressure greater than 180/110 and he develops symptoms such as worsening chest pain or shortness of breath.    ESRD on hemodialysis  Creatine stable for now. BMP reviewed- noted Estimated Creatinine Clearance: 14.4 mL/min (A) (based on SCr of 6 mg/dL (H)). according to latest data. Based on current GFR, CKD stage is end stage.  Monitor UOP and serial BMP and adjust therapy as needed. Renally dose meds. Avoid nephrotoxic medications and procedures.    Plan:  - nephrology consulted, appreciate recs  - HD via R AVF, last dialyzed 9/2/24  - possible fluid overload, may need to draw off fluid with iHD per nephro    Type 2 diabetes mellitus with chronic kidney disease on chronic dialysis, without long-term current use of insulin  Creatine stable for now. BMP reviewed- noted Estimated Creatinine Clearance: 14.4 mL/min (A) (based on SCr of 6 mg/dL (H)). according to latest data. Based on current GFR, CKD stage is end stage.  Monitor UOP and serial BMP and adjust therapy as needed. Renally dose meds. Avoid nephrotoxic medications and procedures.    A1c July 2024 4.3%    Plan:  - low dose SS insulin    Anemia in end-stage renal disease  Anemia is likely due to chronic disease due to ESRD. Most recent hemoglobin and hematocrit are listed below.  Recent Labs     09/02/24  1223   HGB 9.9*   HCT 32.3*     Plan  - Monitor serial CBC: Daily  - Transfuse PRBC if patient becomes hemodynamically unstable, symptomatic or H/H drops below 7/21.  - Patient has not received any PRBC transfusions to date  - Patient's anemia is currently stable    Hyperparathyroidism, secondary renal  Continue home sevelamer, cinacalcet        VTE Risk Mitigation (From admission, onward)           Ordered     heparin (porcine) injection 5,000 Units  Every 12 hours         09/02/24 1723     IP VTE HIGH RISK PATIENT   Once         09/02/24 1723     Place sequential compression device  Until discontinued         09/02/24 1714                       On 09/02/2024, patient should be placed in hospital observation services under my care in collaboration with Dr. Juanito Crawley.         Jose Cruz Faye MD  Department of Hospital Medicine  [facility] - Emergency Dept

## 2024-09-03 NOTE — ED NOTES
Telemetry Verification   Patient placed on Telemetry Box  Verified with War Room  Box # [id number]   Monitor Tech Shawna   Rate 84   Rhythm NS

## 2024-09-03 NOTE — ASSESSMENT & PLAN NOTE
48 y.o. Black or  Male ESRD-HD  presents to ED on 9/2/2024 with diagnosis of: Chest discomfort [R07.89];Hypoxia [R09.02];NSTEMI (non-ST elevated myocardial infarction) [I21.4];Chest pain [R07.9]   Nephrology consulted for inpatient ESRD-HD management      Assessment:   - Will plan for HD tomorrow.   - Pre and Post-HD weights   - Continue to monitor intake and output      Anemia of ESRD   Recent Labs   Lab 08/28/24  0000 09/02/24  1223 09/02/24 2031 09/03/24  0407   WBC  --  14.01*  --  14.26*   HGB 9.2* 9.9*  --  11.0*   HCT  --  32.3* 33* 35.0*   PLT  --  300  --  326       Lab Results   Component Value Date    FESATURATED 66 (H) 11/27/2023    FERRITIN 1,531 (H) 11/27/2023       - Goal in ESRD is Hgb of 10-11.     Mineral Bone Disease in ESRD   Lab Results   Component Value Date     (H) 08/14/2024    CALCIUM 10.4 09/03/2024    ALBUMIN 3.5 09/03/2024    PHOS 2.1 (L) 09/03/2024

## 2024-09-03 NOTE — PLAN OF CARE
[facility] Medical [facility]  Initial Discharge Assessment       Primary Care Provider: Mireya Larose MD    Admission Diagnosis: Chest discomfort [R07.89]  Hypoxia [R09.02]  NSTEMI (non-ST elevated myocardial infarction) [I21.4]  Chest pain [R07.9]    Admission Date: 9/2/2024  Expected Discharge Date: 9/7/2024    Transition of Care Barriers: None    Payor: HU[employer] MANAGED MEDICARE / Plan: [employer] HMO PPO SPECIAL NEEDS / Product Type: Medicare Advantage /     Extended Emergency Contact Information  Primary Emergency Contact: Gabby Dickinson  Address: [address] [place] [address] [place]  Mobile Phone: [phone]  Relation: Mother    Discharge Plan A: Home with family  Discharge Plan B: Home      [id number] - [place], [place] - [address]  [address] [address]  Phone: [phone] Fax: [phone]      Initial Assessment (most recent)       Adult Discharge Assessment - 09/03/24 1408          Discharge Assessment    Assessment Type Discharge Planning Assessment     Confirmed/corrected address, phone number and insurance Yes     Confirmed Demographics Correct on Facesheet     Source of Information health record     When was your last doctors appointment? 07/23/24     Does patient/caregiver understand observation status Yes     Communicated QUIN with patient/caregiver Date not available/Unable to determine     Reason For Admission Nstemi     People in Home alone     Do you expect to return to your current living situation? Yes     Do you have help at home or someone to help you manage your care at home? Yes     Who are your caregiver(s) and their phone number(s)? Gabby Dickinson, mother/cp# [phone]     Prior to hospitilization cognitive status: Alert/Oriented     Current cognitive status: Alert/Oriented     Walking or Climbing Stairs Difficulty no     Dressing/Bathing Difficulty no     Home Accessibility wheelchair  accessible     Equipment Currently Used at Home none     Readmission within 30 days? No     Patient currently being followed by outpatient case management? No     Do you currently have service(s) that help you manage your care at home? No     Do you take prescription medications? Yes     Do you have prescription coverage? Yes     Coverage [employer] Managed Medicare/[employer]     Do you have any problems affording any of your prescribed medications? No     Is the patient taking medications as prescribed? yes     Who is going to help you get home at discharge? Haroldo Dickinson Jr. son     How do you get to doctors appointments? family or friend will provide     Are you on dialysis? Yes     Dialysis Name and Scheduled days [facility] (MWF)     Do you take coumadin? No     Discharge Plan A Home with family     Discharge Plan B Home     DME Needed Upon Discharge  none     Discharge Plan discussed with: Patient     Transition of Care Barriers None        Physical Activity    On average, how many days per week do you engage in moderate to strenuous exercise (like a brisk walk)? 0 days     On average, how many minutes do you engage in exercise at this level? 0 min        Financial Resource Strain    How hard is it for you to pay for the very basics like food, housing, medical care, and heating? Not very hard        Housing Stability    In the last 12 months, was there a time when you were not able to pay the mortgage or rent on time? No     At any time in the past 12 months, were you homeless or living in a shelter (including now)? No        Transportation Needs    Has the lack of transportation kept you from medical appointments, meetings, work or from getting things needed for daily living? No        Food Insecurity    Within the past 12 months, you worried that your food would run out before you got the money to buy more. Never true     Within the past 12 months, the food you bought just didn't last and you didn't  have money to get more. Never true        Stress    Do you feel stress - tense, restless, nervous, or anxious, or unable to sleep at night because your mind is troubled all the time - these days? Only a little        Social Isolation    How often do you feel lonely or isolated from those around you?  Sometimes        Alcohol Use    Q1: How often do you have a drink containing alcohol? Never     Q2: How many drinks containing alcohol do you have on a typical day when you are drinking? Patient does not drink     Q3: How often do you have six or more drinks on one occasion? Never        Utilities    In the past 12 months has the electric, gas, oil, or water company threatened to shut off services in your home? No        Health Literacy    How often do you need to have someone help you when you read instructions, pamphlets, or other written material from your doctor or pharmacy? Sometimes                   SW met with patient to complete discharge planning assessment.  Patient answered questions but said he was not feeling very well.  Patient verified all demographics on face sheet are correct.  Patient states he has family support and expressed no concerns or needs at this time.  Patient completed dc planning assessment and SW verbalized appreciation for completing assessment.  SW provided patient with SW's contact information and name.    CM dept will continue to follow and assist as needed.    Discharge Plan A and Plan B have been determined by review of patient's clinical status, future medical and therapeutic needs, and coverage/benefits for post-acute care in coordination with multidisciplinary team members.    Tracy Virk, ADEEL  [facility]  [phone]

## 2024-09-03 NOTE — HPI
48-year-old male with a past medical history of HTN compilcated by CAD (s/p HILLARY to RCA in 2020), HFpEF, ESRD on dialysis (MWF), previous RCC (s/p bilateral nephrectomies), T2DM, and hx of CVA (w/ residual R-sided deficits) who presented to [facility] ED on 9/2/24 with chest pain. Patient reported a tingling sensation in his left upper chest and left arm which is worse while lying flat and improves with sitting forward. Additionally patient reported associated difficulty breathing and cough. Patient last completed dialysis on the morning prior to arriving in the ED. Patient endorsed that he experiences these symptoms when he is fluid overloaded and requires dialysis.     ED Course  In the emergency department, /83, HR 72, and sPO2 95% on HFNC. Workup consistent with leukocytosis (WBC 14), CMP consistent with ESRD and stable electrolytes, BNP 1900 (increased from baseline), and trop #1 0.095 and trop #2 0.1(though improved from baseline 12). EKG NSR w/o ST depressions or elevations. CXR with significant pulmonary oedema. Patient evaluated by nephrology and recommended to be admitted for dialysis. Patient initially admitted to  for HD.   Nephrology consulted for HD

## 2024-09-03 NOTE — PROGRESS NOTES
09/03/24 0215   Vital Signs   Temp 98.9 °F (37.2 °C)   Temp Source Oral   Pulse 76   Heart Rate Source Monitor   Resp (!) 21   SpO2 96 %   Pulse Oximetry Type Continuous   Flow (L/min) (Oxygen Therapy) 40   Device (Oxygen Therapy) high flow nasal cannula   BP (!) 168/86   MAP (mmHg) 121   BP Location Left arm   BP Method Automatic   Patient Position Lying     Pt is alert and oriented under observation category.  Bedside HD1:1 tx initiated aseptically via RFA AVF without issue.  Pt is stable for tx. UF goal of 3L as tolerated.     Sepsis Criteria were met:

## 2024-09-03 NOTE — CODE/ RAPID DOCUMENTATION
RAPID RESPONSE NURSE NOTE        Admit Date: 2024  LOS: 0  Code Status: Full Code   Date of Consult: 2024  : 1975  Age: 48 y.o.  Weight:   Wt Readings from Last 1 Encounters:   24 73 kg (161 lb)     Sex: male  Race: Black or    Bed: [place] [address]  MRN: [id number]  Time Rapid Response Team page Received:   Time Rapid Response Team at Bedside:   Time Rapid Response Team left Bedside:   Was the patient discharged from an ICU this admission? No   Was the patient discharged from a PACU within last 24 hours? No   Did the patient receive conscious sedation/general anesthesia in last 24 hours? No  Was the patient in the ED within the past 24 hours? Yes  Was the patient on NIPPV within the past 24 hours? No   Did this progress into an ARC or CPA: No  Attending Physician: Kassi Joseph MD  Primary Service: [facility] 5       SITUATION    Notified by bedside RN via phone call.  Reason for alert: hypoxia  Called to evaluate the patient for Respiratory.    BACKGROUND     Why is the patient in the hospital?: NSTEMI (non-ST elevated myocardial infarction)    Patient has a past medical history of CAD (coronary artery disease), native coronary artery, Cataract, Diabetes mellitus, Diabetic retinopathy, Dialysis patient, DM type 2 causing renal disease, not at goal, ESRD (end stage renal disease) started dialysis 2014, History of colon polyps, History of COVID-19, Hyperparathyroidism, secondary renal, Hypertension, NSTEMI (non-ST elevated myocardial infarction), Organ transplant candidate, Pleural effusion, Pneumonia, Post PTCA, Renal cell carcinoma of right kidney, Renal hypertension, and Stroke.    Last Vitals:  Temp: 99.2 °F (37.3 °C) (1926)  Pulse: 81 (2029)  Resp: 28 (2029)  BP: 186/89 (2032)  SpO2: 98 % (2029)    24 Hours Vitals Range:  Temp:  [99.1 °F (37.3 °C)-99.2 °F (37.3 °C)]   Pulse:  [72-86]   Resp:  [18-36]   BP: (143-194)/()   SpO2:   "[87 %-98 %]     Labs:  Recent Labs     09/02/24  1223 09/02/24 2031   WBC 14.01*  --    HGB 9.9*  --    HCT 32.3* 33*     --        Recent Labs     09/02/24  1330 09/02/24 2028    139   K 3.4* 4.1    104   CO2 24 23   BUN 26* 28*   CREATININE 6.0* 6.9*   * 94   MG  --  1.8        Recent Labs     09/02/24 2031   PH 7.473*   PCO2 37.1   PO2 79*   HCO3 27.2   POCSATURATED 96   BE 4*        ASSESSMENT     Physical Exam  Constitutional:       Appearance: He is ill-appearing.   Cardiovascular:      Rate and Rhythm: Normal rate and regular rhythm.   Pulmonary:      Effort: Tachypnea present.      Breath sounds: Rales present.   Skin:     General: Skin is warm and dry.   Neurological:      Mental Status: He is alert and oriented to person, place, and time.     HR 81  /89 (128)  RR 31, SpO2 89% on 15L %     Called to bedside by EDOU RN for increased oxygen demand. Per RN, patient was originally on 2L NC. Completed HD today prior to arrival in the ER with 2.5L removed. However just this evening his oxygen demand has drastically increased.  The patient endorses "a little" shortness of breath that "comes and goes." Tachypneic at rest but does not appear overly uncomfortable. Placed on NRB just prior to RRN arrival to bedside    INTERVENTIONS    The patient was seen for Respiratory problem. Staff concerns included tachypnea, oxygen saturation < 90% despite supplemental oxygen, increased WOB, and increased oxygen requirements. The following interventions were performed: supplemental oxygen, POCT arterial blood gas , portable chest x-ray, continuous pulse ox monitoring continued, continuous cardiac monitoring continued, and CMP, mag, and inpatient consult to critical care.    Patient transported from EDOU back to the main ED with RRN, ED charge RN on continuous cardiac, SpO2 monitoring on 15L NRB with portable O2    Repeat CXR obtained just prior to moving to ED 37    Upon arrival to ED 37 " RT to bedside to place patient on airvo 40L 80%. After switching pt to airvo, ABG obtained  Notable for pH 7.47, PO2 79, SaO2 96    CXR appears worse than earlier [facility] medicine to reach out to nephrology to see about further dialysis. CCM consult also placed d/t drastically increased oxygen demand, will come evaluate pt    Repeat CMP & mag drawn and sent by ED RN    RECOMMENDATIONS    We recommend: Follow up with labs and CCM recommendation  Maintain continuous cardiac, SpO2 monitoring    PROVIDER ESCALATION    Orders received and case discussed with Dr. Crawley and Dr Danielson with  .    Primary team arrival time: 2015    Disposition: Tx to [facility], bed ED 37.    FOLLOW UP    Bedside RNCarol Ann, charge RN Ashly  updated on plan of care. Instructed to call the Rapid Response NurseGHAZAL RN at [phone] for additional questions or concerns.

## 2024-09-03 NOTE — EICU
Nurses Note -- 4 Eyes      9/3/2024   12:52 AM      Skin assessed during: Admit      [x] No Altered Skin Integrity Present    [x]Prevention Measures Documented      [] Yes- Altered Skin Integrity Present or Discovered   [] LDA Added if Not in Epic (Describe Wound)   [] New Altered Skin Integrity was Present on Admit and Documented in LDA   [] Wound Image Taken    Wound Care Consulted? No    Attending Nurse:  González Guzman RN/Staff Member:   Aisha

## 2024-09-03 NOTE — SUBJECTIVE & OBJECTIVE
Past Medical History:   Diagnosis Date    CAD (coronary artery disease), native coronary artery 11/21/2019    Cataract     Diabetes mellitus     Diabetic retinopathy     Dialysis patient     DM type 2 causing renal disease, not at goal     ESRD (end stage renal disease) started dialysis 01/2014 06/05/2014    History of colon polyps 02/10/2021    History of COVID-19 12/29/2022    Hyperparathyroidism, secondary renal 06/05/2014    Hypertension     NSTEMI (non-ST elevated myocardial infarction) 12/21/2013    Organ transplant candidate 06/05/2014    Pleural effusion 06/07/2024    Pneumonia     Post PTCA 08/26/2020    RCA HILLARY 7-25-20    Renal cell carcinoma of right kidney     Renal hypertension     Stroke        Past Surgical History:   Procedure Laterality Date    ANGIOGRAM, CORONARY, WITH LEFT HEART CATHETERIZATION N/A 7/1/2021    Procedure: Angiogram, Coronary, with Left Heart Cath;  Surgeon: Miki Zendejas MD;  Location: [facility] CATH LAB;  Service: Cardiology;  Laterality: N/A;    ANGIOGRAM, CORONARY, WITH LEFT HEART CATHETERIZATION N/A 11/28/2023    Procedure: Angiogram, Coronary, with Left Heart Cath;  Surgeon: Noah Pendleton MD;  Location: [facility] CATH LAB;  Service: Cardiology;  Laterality: N/A;    COLONOSCOPY N/A 5/3/2017    Procedure: COLONOSCOPY;  Surgeon: Rufino Carpenter MD;  Location: [facility] (4TH FLR);  Service: Endoscopy;  Laterality: N/A;  pt states can only schedule on Wednesdays    COLONOSCOPY N/A 2/9/2021    Procedure: COLONOSCOPY;  Surgeon: Edil Wong MD;  Location: [facility] (4TH FLR);  Service: Endoscopy;  Laterality: N/A;  Dialysis MWF/ labwork day of procedure  right arm aceess  per Dr. Colin pt can hold Plavix 5 days prior see note- sm  COVID test on 2/6/21 at [facility] - sm    COLONOSCOPY N/A 2/10/2021    Procedure: COLONOSCOPY;  Surgeon: Robert Ayers MD;  Location: [facility] (4TH FLR);  Service: Endoscopy;  Laterality: N/A;  rescheduled due to poor bowel prep-BB  negative covid screening  2/6/21-BB  dialysis M-W-F-BB  okay to r/s for 2/9/21 and to hold Plavix per Dr. KIRAN Wong-BB  labs same day-BB    CORONARY STENT PLACEMENT N/A 7/25/2019    Procedure: INSERTION, STENT, CORONARY ARTERY;  Surgeon: Miki Zendejas MD;  Location: [facility] CATH LAB;  Service: Cardiology;  Laterality: N/A;    DIALYSIS FISTULA CREATION      FISTULOGRAM N/A 2/18/2019    Procedure: Fistulogram;  Surgeon: Yaneth De La Cruz MD;  Location: [facility] CATH LAB;  Service: Cardiology;  Laterality: N/A;    FISTULOGRAM Right 7/23/2019    Procedure: Fistulogram;  Surgeon: Oz Cordoba MD;  Location: [facility] CATH LAB;  Service: Cardiology;  Laterality: Right;    LAPAROSCOPIC ROBOT-ASSISTED SURGICAL REMOVAL OF KIDNEY USING DA JAIME XI Right 5/19/2022    Procedure: XI ROBOTIC NEPHRECTOMY;  Surgeon: Aidan Hendricks MD;  [address]  Service: Urology;  Laterality: Right;  3hrs    LAPAROSCOPIC ROBOT-ASSISTED SURGICAL REMOVAL OF KIDNEY USING DA JAIME XI Left 1/5/2023    Procedure: XI ROBOTIC NEPHRECTOMY;  Surgeon: Aidan Hendricks MD;  Location: [facility] OR [facility] FLR;  Service: Urology;  Laterality: Left;  4 hrs    LEFT HEART CATHETERIZATION Left 7/25/2019    Procedure: Left heart cath;  Surgeon: Miki Zendejas MD;  Location: [facility] CATH LAB;  Service: Cardiology;  Laterality: Left;    REPAIR  1/5/2023    Procedure: REPAIR; COLOTOMY;  Surgeon: Maikel Lamb MD;  [facility]  Service: General;;    RETINAL LASER PROCEDURE Bilateral 2018 or 2017    Dr. Rothman    VASCULAR SURGERY         Review of patient's allergies indicates:   Allergen Reactions    No known allergies        Current Facility-Administered Medications on File Prior to Encounter   Medication    lidocaine (PF) 10 mg/ml (1%) injection 10 mg     Current Outpatient Medications on File Prior to Encounter   Medication Sig    aspirin (ECOTRIN) 81 MG EC tablet Take 1 tablet (81 mg total) by mouth once daily.    carvediloL (COREG) 25 MG tablet Take 2  tablets (50 mg total) by mouth 2 (two) times daily with meals.    hydrALAZINE (APRESOLINE) 100 MG tablet Take 1 tablet (100 mg total) by mouth every 12 (twelve) hours.    NIFEdipine (PROCARDIA-XL) 90 MG (OSM) 24 hr tablet Take 1 tablet (90 mg total) by mouth once daily.    nitroGLYCERIN (NITROSTAT) 0.4 MG SL tablet Place 1 tablet (0.4 mg total) under the tongue every 5 (five) minutes as needed for Chest pain.    acetaminophen (TYLENOL) 650 MG TbSR Take 1 tablet (650 mg total) by mouth daily as needed (pain).    amLODIPine (NORVASC) 10 MG tablet Take 10 mg by mouth. (Patient not taking: Reported on 7/23/2024)    atorvastatin (LIPITOR) 80 MG tablet Take 1 tablet (80 mg total) by mouth once daily.    cinacalcet (SENSIPAR) 30 MG Tab Take by mouth.    clopidogreL (PLAVIX) 75 mg tablet Take 1 tablet (75 mg total) by mouth once daily.    epoetin philip (PROCRIT) 4,000 unit/mL injection Inject 1.93 mLs (7,720 Units total) into the skin every Mon, Wed, Fri.    ezetimibe (ZETIA) 10 mg tablet Take 1 tablet (10 mg total) by mouth once daily.    isosorbide mononitrate (IMDUR) 120 MG 24 hr tablet Take 1 tablet (120 mg total) by mouth once daily.    isosorbide mononitrate (IMDUR) 60 MG 24 hr tablet Take 1 tablet by mouth once daily. (Patient not taking: Reported on 7/23/2024)    melatonin (MELATIN) 3 mg tablet Take 1 tablet (3 mg total) by mouth nightly. (Patient not taking: Reported on 7/23/2024)    methoxy peg-epoetin beta (MIRCERA INJ) 150 mcg.    methoxy peg-epoetin beta (MIRCERA INJ) 100 mcg.    methoxy peg-epoetin beta (MIRCERA INJ) 75 mcg.    minoxidiL (LONITEN) 2.5 MG tablet Take 2 tablets (5 mg total) by mouth 2 (two) times daily.    ondansetron HCl (ZOFRAN ORAL) 4 mg.    polyethylene glycol (GLYCOLAX) 17 gram/dose powder Take 17 g by mouth daily as needed for Constipation.    sevelamer carbonate (RENVELA) 800 mg Tab Take 2 tablets (1,600 mg total) by mouth 3 (three) times daily with meals.    VELPHORO 500 mg Chew Take 2  tablets by mouth.    vit B complex-C-folic ac-zinc (RENAPLEX) 800 mcg- 12.5 mg Tab Take 1 tablet by mouth.     Family History       Problem Relation (Age of Onset)    Cancer Maternal Grandfather    Cataracts Mother    Diabetes Mother    Heart disease Brother    Hypertension Mother, Father, Sister, Brother    Kidney disease Brother          Tobacco Use    Smoking status: Never    Smokeless tobacco: Never   Substance and Sexual Activity    Alcohol use: Not Currently     Comment: One drink a month    Drug use: No    Sexual activity: Yes     Partners: Female     Birth control/protection: None     Review of Systems   Constitutional: Negative.   Cardiovascular:  Positive for dyspnea on exertion. Negative for chest pain, irregular heartbeat, orthopnea and palpitations.   Respiratory: Negative.     Musculoskeletal: Negative.    Neurological: Negative.      Objective:     Vital Signs (Most Recent):  Temp: 98.9 °F (37.2 °C) (09/03/24 0215)  Pulse: 74 (09/03/24 0245)  Resp: 20 (09/03/24 0245)  BP: (!) 165/80 (09/03/24 0245)  SpO2: 98 % (09/03/24 0245) Vital Signs (24h Range):  Temp:  [98.9 °F (37.2 °C)-100.4 °F (38 °C)] 98.9 °F (37.2 °C)  Pulse:  [72-86] 74  Resp:  [18-36] 20  SpO2:  [87 %-100 %] 98 %  BP: (143-208)/() 165/80     Weight: 69.4 kg (153 lb)  Body mass index is 23.96 kg/m².    SpO2: 98 %         Intake/Output Summary (Last 24 hours) at 9/3/2024 0302  Last data filed at 9/3/2024 0159  Gross per 24 hour   Intake 120 ml   Output 0 ml   Net 120 ml       Lines/Drains/Airways       Peripheral Intravenous Line  Duration                  Hemodialysis AV Fistula Right forearm -- days         Peripheral IV - Single Lumen 09/02/24 1223 20 G Anterior;Distal;Left Upper Arm <1 day                     Physical Exam  Constitutional:       Appearance: Normal appearance.   HENT:      Head: Normocephalic and atraumatic.   Cardiovascular:      Rate and Rhythm: Normal rate.      Pulses: Normal pulses.      Heart sounds: Normal  heart sounds.   Pulmonary:      Effort: Pulmonary effort is normal.      Breath sounds: Normal breath sounds.   Neurological:      General: No focal deficit present.      Mental Status: He is alert and oriented to person, place, and time.   Psychiatric:         Mood and Affect: Mood normal.         Behavior: Behavior normal.          Significant Labs:   Recent Labs   Lab 08/28/24  0000 09/02/24  1223 09/02/24 2031   WBC  --  14.01*  --    HGB 9.2* 9.9*  --    HCT  --  32.3* 33*   PLT  --  300  --        Recent Labs   Lab 09/02/24  1330 09/02/24 2028    139   K 3.4* 4.1    104   CO2 24 23   BUN 26* 28*   CREATININE 6.0* 6.9*   CALCIUM 10.0 9.9       Recent Labs   Lab 09/02/24  1330 09/02/24 2028   ALKPHOS 67 67   BILITOT 1.1* 1.3*   PROT 7.6 7.5   ALT 8* 8*   AST 11 11       Recent Labs   Lab 09/02/24  1748   CHOL 131   TRIG 83   HDL 67     Lab Results   Component Value Date    CHOL 131 09/02/2024    HDL 67 09/02/2024    LDLCALC 47.4 (L) 09/02/2024    TRIG 83 09/02/2024       Recent Labs   Lab 09/02/24  1748 09/02/24 2028 09/03/24  0107   TROPONINI 0.086* 0.108*  0.108* 0.133*       Lab Results   Component Value Date    HGBA1C 4.3 (L) 07/10/2024    HGBA1C 4.7 03/11/2024       Lab Results   Component Value Date    BNP 1,956 (H) 09/02/2024    BNP 1,542 (H) 06/07/2024     (H) 03/10/2024         Significant Imaging: CXR 9/2/24  Pulmonary findings suggest congestive change or edema. No large focal consolidation.

## 2024-09-03 NOTE — CONSULTS
[facility] - Cardiac Medical [facility]  Cardiology  Consult Note    Patient Name: Haroldo Dickinson  MRN: [id number]  Admission Date: 9/2/2024  Hospital Length of Stay: 0 days  Code Status: Full Code   Attending Provider: Denver Mnauel*   Consulting Provider: Jesse Tay MD  Primary Care Physician: Mireya Larose MD  Principal Problem:Acute hypoxemic respiratory failure    Patient information was obtained from patient and ER records.     Inpatient consult to Cardiology  Consult performed by: Jesse Tay MD  Consult ordered by: Pieter Ocasio MD  Reason for consult: Chest pain        Subjective:     Chief Complaint:  Chest pain     HPI:   48-year-old male with a past medical history of HTN compilcated by CAD (s/p HILLARY to RCA in 2020), HFpEF, ESRD on dialysis (MWF), previous RCC (s/p bilateral nephrectomies), T2DM, and hx of CVA (w/ residual R-sided deficits) who presented to [facility] ED on 9/2/24 with chest pain. Patient reported a tingling sensation in his left upper chest and left arm which is worse while lying flat and improves with sitting forward. Additionally patient reported associated difficulty breathing and cough. Patient last completed dialysis on the morning prior to arriving in the ED. Patient endorsed that he experiences these symptoms when he is fluid overloaded and requires dialysis.     ED Course  In the emergency department, /83, HR 72, and sPO2 95% on HFNC. Workup consistent with leukocytosis (WBC 14), CMP consistent with ESRD and stable electrolytes, BNP 1900 (increased from baseline), and trop #1 0.095 and trop #2 0.1(though improved from baseline 12). EKG NSR w/o ST depressions or elevations. CXR with significant pulmonary oedema. Patient evaluated by nephrology and recommended to be admitted for dialysis. Patient initially admitted to  for HD. Prior to leaving ED for floor patient had noted /104 and MICU consulted for nitro drip. Patient transferred to MICU and nitro not initiated as  patient requires HD.     Interval Update  Patient was seen and evaluated at bedside. NAD and hemodynamically stable. Denies any acute complaint. Pt reported chest pain with exertion and with rest relieved with Nitro. Reported that the symptoms started 3 days, a/w sob. Located on the left side. Intermittent, worse with exertion but also present at rest. Reported that he is compliant with his medications but has not seen a cardiologist for years.     Past Medical History:   Diagnosis Date    CAD (coronary artery disease), native coronary artery 11/21/2019    Cataract     Diabetes mellitus     Diabetic retinopathy     Dialysis patient     DM type 2 causing renal disease, not at goal     ESRD (end stage renal disease) started dialysis 01/2014 06/05/2014    History of colon polyps 02/10/2021    History of COVID-19 12/29/2022    Hyperparathyroidism, secondary renal 06/05/2014    Hypertension     NSTEMI (non-ST elevated myocardial infarction) 12/21/2013    Organ transplant candidate 06/05/2014    Pleural effusion 06/07/2024    Pneumonia     Post PTCA 08/26/2020    RCA HILLARY 7-25-20    Renal cell carcinoma of right kidney     Renal hypertension     Stroke        Past Surgical History:   Procedure Laterality Date    ANGIOGRAM, CORONARY, WITH LEFT HEART CATHETERIZATION N/A 7/1/2021    Procedure: Angiogram, Coronary, with Left Heart Cath;  Surgeon: Miki Zendejas MD;  Location: [facility] CATH LAB;  Service: Cardiology;  Laterality: N/A;    ANGIOGRAM, CORONARY, WITH LEFT HEART CATHETERIZATION N/A 11/28/2023    Procedure: Angiogram, Coronary, with Left Heart Cath;  Surgeon: Noah Pendleton MD;  Location: [facility] CATH LAB;  Service: Cardiology;  Laterality: N/A;    COLONOSCOPY N/A 5/3/2017    Procedure: COLONOSCOPY;  Surgeon: Rufino Carpenter MD;  Location: [facility] ENDO ([address] [place] [address]);  Service: Endoscopy;  Laterality: N/A;  pt states can only schedule on Wednesdays    COLONOSCOPY N/A 2/9/2021    Procedure: COLONOSCOPY;  Surgeon: Edil Wong  MD;  Location: [facility] ENDO (4TH FLR);  Service: Endoscopy;  Laterality: N/A;  Dialysis MWF/ labwork day of procedure  right arm aceess  per Dr. Colin pt can hold Plavix 5 days prior see note- sm  COVID test on 2/6/21 at [facility] -     COLONOSCOPY N/A 2/10/2021    Procedure: COLONOSCOPY;  Surgeon: Robert Ayers MD;  Location: [facility] ENDO (4TH FLR);  Service: Endoscopy;  Laterality: N/A;  rescheduled due to poor bowel prep-BB  negative covid screening 2/6/21-BB  dialysis M-W-F-BB  okay to r/s for 2/9/21 and to hold Plavix per Dr. KIRAN Wong-BB  labs same day-BB    CORONARY STENT PLACEMENT N/A 7/25/2019    Procedure: INSERTION, STENT, CORONARY ARTERY;  Surgeon: Miki Zendejas MD;  Location: [facility] CATH LAB;  Service: Cardiology;  Laterality: N/A;    DIALYSIS FISTULA CREATION      FISTULOGRAM N/A 2/18/2019    Procedure: Fistulogram;  Surgeon: Yaneth De La Cruz MD;  Location: [facility] CATH LAB;  Service: Cardiology;  Laterality: N/A;    FISTULOGRAM Right 7/23/2019    Procedure: Fistulogram;  Surgeon: Oz Cordoba MD;  Location: [facility] CATH LAB;  Service: Cardiology;  Laterality: Right;    LAPAROSCOPIC ROBOT-ASSISTED SURGICAL REMOVAL OF KIDNEY USING DA JAIME XI Right 5/19/2022    Procedure: XI ROBOTIC NEPHRECTOMY;  Surgeon: Aidan Hendricks MD;  Location: [address];  Service: Urology;  Laterality: Right;  3hrs    LAPAROSCOPIC ROBOT-ASSISTED SURGICAL REMOVAL OF KIDNEY USING DA JAIME XI Left 1/5/2023    Procedure: XI ROBOTIC NEPHRECTOMY;  Surgeon: Aidan Hendricks MD;  [address]  Service: Urology;  Laterality: Left;  4 hrs    LEFT HEART CATHETERIZATION Left 7/25/2019    Procedure: Left heart cath;  Surgeon: Miki Zendejas MD;  Location: [facility] CATH LAB;  Service: Cardiology;  Laterality: Left;    REPAIR  1/5/2023    Procedure: REPAIR; COLOTOMY;  Surgeon: Maikel Lamb MD;  Location: [address];  Service: General;;    RETINAL LASER PROCEDURE Bilateral 2018 or 2017    Dr. Rothman     VASCULAR SURGERY         Review of patient's allergies indicates:   Allergen Reactions    No known allergies        Current Facility-Administered Medications on File Prior to Encounter   Medication    lidocaine (PF) 10 mg/ml (1%) injection 10 mg     Current Outpatient Medications on File Prior to Encounter   Medication Sig    aspirin (ECOTRIN) 81 MG EC tablet Take 1 tablet (81 mg total) by mouth once daily.    carvediloL (COREG) 25 MG tablet Take 2 tablets (50 mg total) by mouth 2 (two) times daily with meals.    hydrALAZINE (APRESOLINE) 100 MG tablet Take 1 tablet (100 mg total) by mouth every 12 (twelve) hours.    NIFEdipine (PROCARDIA-XL) 90 MG (OSM) 24 hr tablet Take 1 tablet (90 mg total) by mouth once daily.    nitroGLYCERIN (NITROSTAT) 0.4 MG SL tablet Place 1 tablet (0.4 mg total) under the tongue every 5 (five) minutes as needed for Chest pain.    acetaminophen (TYLENOL) 650 MG TbSR Take 1 tablet (650 mg total) by mouth daily as needed (pain).    amLODIPine (NORVASC) 10 MG tablet Take 10 mg by mouth. (Patient not taking: Reported on 7/23/2024)    atorvastatin (LIPITOR) 80 MG tablet Take 1 tablet (80 mg total) by mouth once daily.    cinacalcet (SENSIPAR) 30 MG Tab Take by mouth.    clopidogreL (PLAVIX) 75 mg tablet Take 1 tablet (75 mg total) by mouth once daily.    epoetin philip (PROCRIT) 4,000 unit/mL injection Inject 1.93 mLs (7,720 Units total) into the skin every Mon, Wed, Fri.    ezetimibe (ZETIA) 10 mg tablet Take 1 tablet (10 mg total) by mouth once daily.    isosorbide mononitrate (IMDUR) 120 MG 24 hr tablet Take 1 tablet (120 mg total) by mouth once daily.    isosorbide mononitrate (IMDUR) 60 MG 24 hr tablet Take 1 tablet by mouth once daily. (Patient not taking: Reported on 7/23/2024)    melatonin (MELATIN) 3 mg tablet Take 1 tablet (3 mg total) by mouth nightly. (Patient not taking: Reported on 7/23/2024)    methoxy peg-epoetin beta (MIRCERA INJ) 150 mcg.    methoxy peg-epoetin beta (MIRCERA INJ)  100 mcg.    methoxy peg-epoetin beta (MIRCERA INJ) 75 mcg.    minoxidiL (LONITEN) 2.5 MG tablet Take 2 tablets (5 mg total) by mouth 2 (two) times daily.    ondansetron HCl (ZOFRAN ORAL) 4 mg.    polyethylene glycol (GLYCOLAX) 17 gram/dose powder Take 17 g by mouth daily as needed for Constipation.    sevelamer carbonate (RENVELA) 800 mg Tab Take 2 tablets (1,600 mg total) by mouth 3 (three) times daily with meals.    VELPHORO 500 mg Chew Take 2 tablets by mouth.    vit B complex-C-folic ac-zinc (RENAPLEX) 800 mcg- 12.5 mg Tab Take 1 tablet by mouth.     Family History       Problem Relation (Age of Onset)    Cancer Maternal Grandfather    Cataracts Mother    Diabetes Mother    Heart disease Brother    Hypertension Mother, Father, Sister, Brother    Kidney disease Brother          Tobacco Use    Smoking status: Never    Smokeless tobacco: Never   Substance and Sexual Activity    Alcohol use: Not Currently     Comment: One drink a month    Drug use: No    Sexual activity: Yes     Partners: Female     Birth control/protection: None     Review of Systems   Constitutional: Negative.   Cardiovascular:  Positive for dyspnea on exertion. Negative for chest pain, irregular heartbeat, orthopnea and palpitations.   Respiratory: Negative.     Musculoskeletal: Negative.    Neurological: Negative.      Objective:     Vital Signs (Most Recent):  Temp: 98.9 °F (37.2 °C) (09/03/24 0215)  Pulse: 74 (09/03/24 0245)  Resp: 20 (09/03/24 0245)  BP: (!) 165/80 (09/03/24 0245)  SpO2: 98 % (09/03/24 0245) Vital Signs (24h Range):  Temp:  [98.9 °F (37.2 °C)-100.4 °F (38 °C)] 98.9 °F (37.2 °C)  Pulse:  [72-86] 74  Resp:  [18-36] 20  SpO2:  [87 %-100 %] 98 %  BP: (143-208)/() 165/80     Weight: 69.4 kg (153 lb)  Body mass index is 23.96 kg/m².    SpO2: 98 %         Intake/Output Summary (Last 24 hours) at 9/3/2024 0302  Last data filed at 9/3/2024 0159  Gross per 24 hour   Intake 120 ml   Output 0 ml   Net 120 ml        Lines/Drains/Airways       Peripheral Intravenous Line  Duration                  Hemodialysis AV Fistula Right forearm -- days         Peripheral IV - Single Lumen 09/02/24 1223 20 G Anterior;Distal;Left Upper Arm <1 day                     Physical Exam  Constitutional:       Appearance: Normal appearance.   HENT:      Head: Normocephalic and atraumatic.   Cardiovascular:      Rate and Rhythm: Normal rate.      Pulses: Normal pulses.      Heart sounds: Normal heart sounds.   Pulmonary:      Effort: Pulmonary effort is normal.      Breath sounds: Normal breath sounds.   Neurological:      General: No focal deficit present.      Mental Status: He is alert and oriented to person, place, and time.   Psychiatric:         Mood and Affect: Mood normal.         Behavior: Behavior normal.          Significant Labs:   Recent Labs   Lab 08/28/24  0000 09/02/24  1223 09/02/24 2031   WBC  --  14.01*  --    HGB 9.2* 9.9*  --    HCT  --  32.3* 33*   PLT  --  300  --        Recent Labs   Lab 09/02/24  1330 09/02/24 2028    139   K 3.4* 4.1    104   CO2 24 23   BUN 26* 28*   CREATININE 6.0* 6.9*   CALCIUM 10.0 9.9       Recent Labs   Lab 09/02/24  1330 09/02/24 2028   ALKPHOS 67 67   BILITOT 1.1* 1.3*   PROT 7.6 7.5   ALT 8* 8*   AST 11 11       Recent Labs   Lab 09/02/24 1748   CHOL 131   TRIG 83   HDL 67     Lab Results   Component Value Date    CHOL 131 09/02/2024    HDL 67 09/02/2024    LDLCALC 47.4 (L) 09/02/2024    TRIG 83 09/02/2024       Recent Labs   Lab 09/02/24  1748 09/02/24 2028 09/03/24  0107   TROPONINI 0.086* 0.108*  0.108* 0.133*       Lab Results   Component Value Date    HGBA1C 4.3 (L) 07/10/2024    HGBA1C 4.7 03/11/2024       Lab Results   Component Value Date    BNP 1,956 (H) 09/02/2024    BNP 1,542 (H) 06/07/2024     (H) 03/10/2024         Significant Imaging: CXR 9/2/24  Pulmonary findings suggest congestive change or edema. No large focal consolidation.     Assessment and  Plan:     Triple vessel coronary artery disease  Patient with known CAD; triple vessel disease. Presented to the ED with c/o chest pain, left sided radiates to the proximal left arm, worse with exertion, present at rest and relieved with Nitro.   - ECHO: 6/7/24     Left Ventricle: The left ventricle is mildly dilated. Moderately increased ventricular mass. Mildly increased wall thickness. There is moderate concentric hypertrophy. Regional wall motion abnormalities present. The following segments are hypokinetic: basal inferior, mid inferoseptal, mid inferolateral, apical septal, apical inferior and apex.. There is normal diastolic function.    Right Ventricle: Normal right ventricular cavity size. Wall thickness is normal. Systolic function is normal.    Left Atrium: Left atrium is severely dilated.    Right Atrium: Normal and compressed right atrial size.    Aortic Valve: The aortic valve is a trileaflet valve. There is moderate aortic valve sclerosis. There is moderate annular calcification present.    Mitral Valve: There is moderate mitral annular calcification present. There is mild regurgitation.    Tricuspid Valve: There is mild regurgitation.    Aorta: Aortic root is normal in size measuring 3.68 cm. Ascending aorta is mildly dilated measuring 3.89 cm.    Pulmonary Artery: The estimated pulmonary artery systolic pressure is 25 mmHg.    IVC/SVC: Elevated venous pressure at 15 mmHg.    Pericardium: There is a trivial circumferential effusion.    Cardiac Cath 11/28/23     There was three vessel coronary artery disease.    The Mid LAD lesion was 90% stenosed.    The Mid RCA lesion was 100% stenosed.    The Prox LAD lesion was 75% stenosed.    The pre-procedure left ventricular end diastolic pressure was 26.    There was diastolic dysfunction.    Interval worsening of mid LAD disease. Given diffuse nature of CAD will recommend medcial therapy and HD optimization to improve left sided filling pressures    The  estimated blood loss was <50 mL.    Right Coronary Artery  Mid RCA lesion was 100% stenosed. The lesion was previously treated using a drug-eluting stent and angioplasty.    Acute Marginal Branch  Collaterals  Acute Mrg filled by collaterals from Mid RCA.      Right Posterior Atrioventricular Artery  Collaterals  RPAV filled by collaterals from 1st Sept.      Recommendations at the time was medical management. With [facility] medical management. Risk factor reductions. ASA 81mg and Plavix for one year. Cardiac rehab referral and Statin therapy.  - EKG: No ischemic changes   - Elevated Trop likely T2MI in the setting of Hypertensive Emergency with an underling diffuse CAD     Plan   - Continue with DAPT. ASA 81 and plavix 75mg qd  - Continue with Atorvastatin 80 mg HS   - Consider resuming Isosorbide mononitrate 120 mg qd   - Continue with coreg and nifedipine   - Continue with nitro sub-lingual   - Pt will benefit from Gen Cardiology follow up upon discharge for symptomatic treatment and closer monitoring   - BP goal 130/80 LDL 70  - Will continue to monitor    - Given significant chest pain at rest and with exertion. Consider consulting interventional cardiology in the am for re-evaluation.   - Pt is chest pain free upon encounter             Hypertensive emergency  Patient has a current diagnosis of Hypertensive emergency with end organ damage evidenced by acute pulmonary edema without heart failure which is controlled.  Latest blood pressure and vitals reviewed-   Temp:  [98.9 °F (37.2 °C)-100.4 °F (38 °C)]   Pulse:  [67-86]   Resp:  [5-36]   BP: (143-208)/()   SpO2:  [87 %-100 %] .   Patient currently on antihypertensives.   Home meds for hypertension were reviewed and noted below.   Hypertension Medications               amLODIPine (NORVASC) 10 MG tablet Take 10 mg by mouth.    carvediloL (COREG) 25 MG tablet Take 2 tablets (50 mg total) by mouth 2 (two) times daily with meals.    hydrALAZINE (APRESOLINE)  100 MG tablet Take 1 tablet (100 mg total) by mouth every 12 (twelve) hours.    isosorbide mononitrate (IMDUR) 120 MG 24 hr tablet Take 1 tablet (120 mg total) by mouth once daily.    isosorbide mononitrate (IMDUR) 60 MG 24 hr tablet Take 1 tablet by mouth once daily.    minoxidiL (LONITEN) 2.5 MG tablet Take 2 tablets (5 mg total) by mouth 2 (two) times daily.    NIFEdipine (PROCARDIA-XL) 90 MG (OSM) 24 hr tablet Take 1 tablet (90 mg total) by mouth once daily.    nitroGLYCERIN (NITROSTAT) 0.4 MG SL tablet Place 1 tablet (0.4 mg total) under the tongue every 5 (five) minutes as needed for Chest pain.          - Pt presented with Hypertensive emergency with flash pulmonary edema.   - Although reported medication compliance and HD schedule. Presented with chest pain, high BP and elevated troponin  - EKG: Notable for significant LVH with repolarization abnormalities. Notable Left atrial enlargement HR 80  NSR  - CXR: Pulmonary findings suggest congestive change or edema. No large focal consolidation   - Currently on HD and High flow NC     Plan   - Continue with Nifedipine 90 mg  - Continue with Coreg 50 mg BID   - Consider starting Hydralazine 100 mg BID   - Continue to monitor BP   - Follow up with Nephrology recommendations.         VTE Risk Mitigation (From admission, onward)           Ordered     heparin (porcine) injection 5,000 Units  Every 12 hours         09/02/24 1723     IP VTE HIGH RISK PATIENT  Once         09/02/24 1723     Place sequential compression device  Until discontinued         09/02/24 1714                    Thank you for your consult. I will follow-up with patient. Please contact us if you have any additional questions. Rest of the care as per primary team     Jesse Tay MD  Cardiology   [facility] - Cardiac Medical ICU

## 2024-09-03 NOTE — ED NOTES
Pt brought to ED 37 by PORTIA Maya with [facility]; SAUL Griffin RN; Dr. Faye and Dr. Danielson at this time.

## 2024-09-03 NOTE — ASSESSMENT & PLAN NOTE
48 y.o. Black or  Male ESRD-HD  presents to ED on 9/2/2024 with diagnosis of: Chest discomfort [R07.89];Hypoxia [R09.02];NSTEMI (non-ST elevated myocardial infarction) [I21.4];Chest pain [R07.9]   Nephrology consulted for inpatient ESRD-HD management    Last HD 09/02 still volume overloaded     Assessment:   - Will provide dialysis today  with UF 3L as BP tolerates for metabolic clearance and volume management , FOR 2 HRS  - Pre and Post-HD weights   - Continue to monitor intake and output      Anemia of ESRD   Recent Labs   Lab 08/28/24  0000 09/02/24  1223 09/02/24 2031 09/03/24  0407   WBC  --  14.01*  --  14.26*   HGB 9.2* 9.9*  --  11.0*   HCT  --  32.3* 33* 35.0*   PLT  --  300  --  326     Lab Results   Component Value Date    FESATURATED 66 (H) 11/27/2023    FERRITIN 1,531 (H) 11/27/2023       - Goal in ESRD is Hgb of 10-11.     Mineral Bone Disease in ESRD   Lab Results   Component Value Date     (H) 08/14/2024    CALCIUM 10.4 09/03/2024    ALBUMIN 3.5 09/03/2024    PHOS 2.1 (L) 09/03/2024

## 2024-09-03 NOTE — CARE UPDATE
RAPID RESPONSE NURSE FOLLOW-UP NOTE       Followed up with patient for a rapid response.  Patient accepted by critical care medicine, will transfer to ICU. Reviewed plan of care with ED charge Ashly PEARCE .   Lincolnia will sign off care to [facility] at this time  Please call Rapid Response RNGHAZAL RN with any questions or concerns at [phone].

## 2024-09-03 NOTE — SUBJECTIVE & OBJECTIVE
Interval history: NAEON. Patient had chest pain, shortness of breath associated with lying flat, symptoms he correlates with times when he had been overloaded in the past.  Patient was dialyzed yesterday(9/2) and his symptoms have resolved. He has no chest pain, SOB, or palpitations.With patient being dialyzed and managed under Nephrology, and current symptoms for which Cards was consulted resolved in a backdrop of HPpEF(Echo today- 9/3, shows EF of 50-55%), cardiac consult team will sign off today for subsequent primary team and Nephrology continued management.            Past Medical History:   Diagnosis Date    CAD (coronary artery disease), native coronary artery 11/21/2019    Cataract     Diabetes mellitus     Diabetic retinopathy     Dialysis patient     DM type 2 causing renal disease, not at goal     ESRD (end stage renal disease) started dialysis 01/2014 06/05/2014    History of colon polyps 02/10/2021    History of COVID-19 12/29/2022    Hyperparathyroidism, secondary renal 06/05/2014    Hypertension     NSTEMI (non-ST elevated myocardial infarction) 12/21/2013    Organ transplant candidate 06/05/2014    Pleural effusion 06/07/2024    Pneumonia     Post PTCA 08/26/2020    RCA HILLARY 7-25-20    Renal cell carcinoma of right kidney     Renal hypertension     Stroke        Past Surgical History:   Procedure Laterality Date    ANGIOGRAM, CORONARY, WITH LEFT HEART CATHETERIZATION N/A 7/1/2021    Procedure: Angiogram, Coronary, with Left Heart Cath;  Surgeon: Miki Zendejas MD;  Location: [facility] CATH LAB;  Service: Cardiology;  Laterality: N/A;    ANGIOGRAM, CORONARY, WITH LEFT HEART CATHETERIZATION N/A 11/28/2023    Procedure: Angiogram, Coronary, with Left Heart Cath;  Surgeon: Noah Pendleton MD;  Location: [facility] CATH LAB;  Service: Cardiology;  Laterality: N/A;    COLONOSCOPY N/A 5/3/2017    Procedure: COLONOSCOPY;  Surgeon: Rufino Carpenter MD;  Location: [facility] ENDO ([address] [place] [address]);  Service: Endoscopy;  Laterality:  N/A;  pt states can only schedule on Wednesdays    COLONOSCOPY N/A 2/9/2021    Procedure: COLONOSCOPY;  Surgeon: Edil Wong MD;  Location: [facility] ENDO (4TH FLR);  Service: Endoscopy;  Laterality: N/A;  Dialysis MWF/ labwork day of procedure  right arm aceess  per Dr. Colin pt can hold Plavix 5 days prior see note- sm  COVID test on 2/6/21 at [facility] -     COLONOSCOPY N/A 2/10/2021    Procedure: COLONOSCOPY;  Surgeon: Robert Ayers MD;  Location: [facility] ENDO (4TH FLR);  Service: Endoscopy;  Laterality: N/A;  rescheduled due to poor bowel prep-BB  negative covid screening 2/6/21-BB  dialysis M-W-F-BB  okay to r/s for 2/9/21 and to hold Plavix per Dr. KIRAN Wong-BB  labs same day-BB    CORONARY STENT PLACEMENT N/A 7/25/2019    Procedure: INSERTION, STENT, CORONARY ARTERY;  Surgeon: Miki Zendejas MD;  Location: [facility] CATH LAB;  Service: Cardiology;  Laterality: N/A;    DIALYSIS FISTULA CREATION      FISTULOGRAM N/A 2/18/2019    Procedure: Fistulogram;  Surgeon: Yaneth De La Cruz MD;  Location: [facility] CATH LAB;  Service: Cardiology;  Laterality: N/A;    FISTULOGRAM Right 7/23/2019    Procedure: Fistulogram;  Surgeon: Oz Cordoba MD;  Location: [facility] CATH LAB;  Service: Cardiology;  Laterality: Right;    LAPAROSCOPIC ROBOT-ASSISTED SURGICAL REMOVAL OF KIDNEY USING DA JAIME XI Right 5/19/2022    Procedure: XI ROBOTIC NEPHRECTOMY;  Surgeon: Aidan Hendricks MD;  Location: [address];  Service: Urology;  Laterality: Right;  3hrs    LAPAROSCOPIC ROBOT-ASSISTED SURGICAL REMOVAL OF KIDNEY USING DA JAIME XI Left 1/5/2023    Procedure: XI ROBOTIC NEPHRECTOMY;  Surgeon: Aidan Hendricks MD;  Location: [address];  Service: Urology;  Laterality: Left;  4 hrs    LEFT HEART CATHETERIZATION Left 7/25/2019    Procedure: Left heart cath;  Surgeon: Miki Zendejas MD;  Location: [facility] CATH LAB;  Service: Cardiology;  Laterality: Left;    REPAIR  1/5/2023    Procedure: REPAIR; COLOTOMY;  Surgeon: Maikel HARMAN  MD Adonis;  Location: [facility] OR [address] [place] [address];  Service: General;;    RETINAL LASER PROCEDURE Bilateral 2018 or 2017    Dr. Rothman    VASCULAR SURGERY         Review of patient's allergies indicates:   Allergen Reactions    No known allergies        Current Facility-Administered Medications on File Prior to Encounter   Medication    lidocaine (PF) 10 mg/ml (1%) injection 10 mg     Current Outpatient Medications on File Prior to Encounter   Medication Sig    aspirin (ECOTRIN) 81 MG EC tablet Take 1 tablet (81 mg total) by mouth once daily.    carvediloL (COREG) 25 MG tablet Take 2 tablets (50 mg total) by mouth 2 (two) times daily with meals.    hydrALAZINE (APRESOLINE) 100 MG tablet Take 1 tablet (100 mg total) by mouth every 12 (twelve) hours. (Patient taking differently: Take 100 mg by mouth every 8 (eight) hours.)    NIFEdipine (PROCARDIA-XL) 90 MG (OSM) 24 hr tablet Take 1 tablet (90 mg total) by mouth once daily.    nitroGLYCERIN (NITROSTAT) 0.4 MG SL tablet Place 1 tablet (0.4 mg total) under the tongue every 5 (five) minutes as needed for Chest pain.    SENSIPAR 60 mg Tab Take 60 mg by mouth daily with breakfast.    XPHOZAH 20 mg Tab Take 20 mg by mouth 2 (two) times a day.    acetaminophen (TYLENOL) 650 MG TbSR Take 1 tablet (650 mg total) by mouth daily as needed (pain).    atorvastatin (LIPITOR) 80 MG tablet Take 1 tablet (80 mg total) by mouth once daily.    clopidogreL (PLAVIX) 75 mg tablet Take 1 tablet (75 mg total) by mouth once daily.    epoetin philip (PROCRIT) 4,000 unit/mL injection Inject 1.93 mLs (7,720 Units total) into the skin every Mon, Wed, Fri.    ezetimibe (ZETIA) 10 mg tablet Take 1 tablet (10 mg total) by mouth once daily.    isosorbide mononitrate (IMDUR) 120 MG 24 hr tablet Take 1 tablet (120 mg total) by mouth once daily.    methoxy peg-epoetin beta (MIRCERA INJ) 150 mcg.    methoxy peg-epoetin beta (MIRCERA INJ) 100 mcg.    methoxy peg-epoetin beta (MIRCERA INJ) 75 mcg.    minoxidiL  (LONITEN) 2.5 MG tablet Take 2 tablets (5 mg total) by mouth 2 (two) times daily.    polyethylene glycol (GLYCOLAX) 17 gram/dose powder Take 17 g by mouth daily as needed for Constipation.    sevelamer carbonate (RENVELA) 800 mg Tab Take 2 tablets (1,600 mg total) by mouth 3 (three) times daily with meals. (Patient taking differently: Take 800 mg by mouth 3 (three) times daily with meals.)    vit B complex-C-folic ac-zinc (RENAPLEX) 800 mcg- 12.5 mg Tab Take 1 tablet by mouth.    [DISCONTINUED] amLODIPine (NORVASC) 10 MG tablet Take 10 mg by mouth. (Patient not taking: Reported on 7/23/2024)    [DISCONTINUED] cinacalcet (SENSIPAR) 30 MG Tab Take by mouth.    [DISCONTINUED] isosorbide mononitrate (IMDUR) 60 MG 24 hr tablet Take 1 tablet by mouth once daily. (Patient not taking: Reported on 7/23/2024)    [DISCONTINUED] melatonin (MELATIN) 3 mg tablet Take 1 tablet (3 mg total) by mouth nightly. (Patient not taking: Reported on 7/23/2024)    [DISCONTINUED] ondansetron HCl (ZOFRAN ORAL) 4 mg.    [DISCONTINUED] VELPHORO 500 mg Chew Take 2 tablets by mouth.     Family History       Problem Relation (Age of Onset)    Cancer Maternal Grandfather    Cataracts Mother    Diabetes Mother    Heart disease Brother    Hypertension Mother, Father, Sister, Brother    Kidney disease Brother          Tobacco Use    Smoking status: Never    Smokeless tobacco: Never   Substance and Sexual Activity    Alcohol use: Not Currently     Comment: One drink a month    Drug use: No    Sexual activity: Yes     Partners: Female     Birth control/protection: None     Review of Systems   Constitutional: Negative.   Cardiovascular:  Positive for dyspnea on exertion (this has become less severe since after dialysis and he hasn't ambulated). Negative for chest pain, irregular heartbeat, orthopnea and palpitations.   Respiratory: Negative.  Negative for cough.    Musculoskeletal: Negative.    Neurological: Negative.                Objective:     Vital  Signs (Most Recent):  Temp: 98.2 °F (36.8 °C) (09/03/24 0415)  Pulse: 63 (09/03/24 1141)  Resp: 20 (09/03/24 1141)  BP: (!) 143/69 (09/03/24 1341)  SpO2: 98 % (09/03/24 1141) Vital Signs (24h Range):  Temp:  [98.2 °F (36.8 °C)-100.4 °F (38 °C)] 98.2 °F (36.8 °C)  Pulse:  [63-86] 63  Resp:  [5-36] 20  SpO2:  [87 %-100 %] 98 %  BP: (143-208)/() 143/69     Weight: 69.4 kg (153 lb)  Body mass index is 23.96 kg/m².    SpO2: 98 %         Intake/Output Summary (Last 24 hours) at 9/3/2024 1401  Last data filed at 9/3/2024 0900  Gross per 24 hour   Intake 220 ml   Output 3500 ml   Net -3280 ml       Lines/Drains/Airways       Peripheral Intravenous Line  Duration                  Hemodialysis AV Fistula Right forearm -- days         Peripheral IV - Single Lumen 09/02/24 1223 20 G Anterior;Distal;Left Upper Arm 1 day                     Physical Exam  Constitutional:       Appearance: Normal appearance. He is normal weight. He is ill-appearing.   HENT:      Head: Normocephalic and atraumatic.   Cardiovascular:      Rate and Rhythm: Normal rate.      Pulses: Normal pulses.      Heart sounds: Normal heart sounds.   Pulmonary:      Breath sounds: Rales (crackles heard at the lung bases) present.   Abdominal:      General: There is distension.   Skin:     Coloration: Skin is not jaundiced or pale.   Neurological:      General: No focal deficit present.      Mental Status: He is alert and oriented to person, place, and time.   Psychiatric:         Mood and Affect: Mood normal.         Behavior: Behavior normal.          Significant Labs:   Recent Labs   Lab 08/28/24  0000 09/02/24  1223 09/02/24  2031 09/03/24  0407   WBC  --  14.01*  --  14.26*   HGB 9.2* 9.9*  --  11.0*   HCT  --  32.3* 33* 35.0*   PLT  --  300  --  326       Recent Labs   Lab 09/02/24  1330 09/02/24 2028 09/03/24  0407    139 137   K 3.4* 4.1 3.6    104 103   CO2 24 23 23   BUN 26* 28* 17   CREATININE 6.0* 6.9* 4.1*   CALCIUM 10.0 9.9 10.4    PHOS  --   --  2.1*       Recent Labs   Lab 09/02/24  1330 09/02/24 2028 09/03/24  0407   ALKPHOS 67 67 77   BILITOT 1.1* 1.3* 1.8*   PROT 7.6 7.5 9.0*   ALT 8* 8* 10   AST 11 11 12       Recent Labs   Lab 09/02/24  1748   CHOL 131   TRIG 83   HDL 67     Lab Results   Component Value Date    CHOL 131 09/02/2024    HDL 67 09/02/2024    LDLCALC 47.4 (L) 09/02/2024    TRIG 83 09/02/2024       Recent Labs   Lab 09/02/24 2028 09/03/24  0107 09/03/24  0913   TROPONINI 0.108*  0.108* 0.133* 0.149*       Lab Results   Component Value Date    HGBA1C 4.3 (L) 07/10/2024    HGBA1C 4.7 03/11/2024       Lab Results   Component Value Date    BNP 1,956 (H) 09/02/2024    BNP 1,542 (H) 06/07/2024     (H) 03/10/2024         Significant Imaging: CXR 9/2/24  Pulmonary findings suggest congestive change or edema. No large focal consolidation.

## 2024-09-03 NOTE — ED NOTES
Pt placed on 10L non-rebreather mask per verbal order of Dr. Faye at this time, pt O2 now 91%. Dr. Danielson at pt bedside.

## 2024-09-03 NOTE — PLAN OF CARE
Inpatient Upgrade Note    Haroldo Dickinson has warranted treatment spanning two or more midnights of hospital level care for the management of  acute hypoxemia respiratory failure/chest pain . He continues to require daily labs, supplemental oxygen, monitoring of vital signs, medication adjustments, and further evaluation by consultants. His condition is also complicated by the following comorbidities: Coronary Artery Disease, Hypertension, Diabetes, Chronic kidney disease, and ESRD on dialysis .

## 2024-09-03 NOTE — HPI
48-year-old male with a past medical history of HTN compilcated by CAD (s/p HILLARY to RCA in 2020), HFpEF, ESRD on dialysis (MWF), previous RCC (s/p bilateral nephrectomies), T2DM, and hx of CVA (w/ residual R-sided deficits) who presented to [facility] ED on 9/2/24 with chest pain. Patient reported a tingling sensation in his left upper chest and left arm which is worse while lying flat and improves with sitting forward. Additionally patient reported associated difficulty breathing and cough. Patient last completed dialysis on the morning prior to arriving in the ED. Patient endorsed that he experiences these symptoms when he is fluid overloaded and requires dialysis.     ED Course  In the emergency department, /83, HR 72, and sPO2 95% on HFNC. Workup consistent with leukocytosis (WBC 14), CMP consistent with ESRD and stable electrolytes, BNP 1900 (increased from baseline), and trop #1 0.095 and trop #2 0.1(though improved from baseline 12). EKG NSR w/o ST depressions or elevations. CXR with significant pulmonary oedema. Patient evaluated by nephrology and recommended to be admitted for dialysis. Patient initially admitted to  for HD. Prior to leaving ED for floor patient had noted /104 and MICU consulted for nitro drip. Patient transferred to MICU and nitro not initiated as patient requires HD.     Interval Update  Patient was seen and evaluated at bedside. NAD and hemodynamically stable. Denies any acute complaint. Pt reported chest pain with exertion and with rest relieved with Nitro. Reported that the symptoms started 3 days, a/w sob. Located on the left side. Intermittent, worse with exertion but also present at rest. Reported that he is compliant with his medications but has not seen a cardiologist for years.

## 2024-09-03 NOTE — PROGRESS NOTES
09/03/24 0415   Vital Signs   Temp 98.2 °F (36.8 °C)   Temp Source Oral   Pulse 68   Heart Rate Source Monitor   Resp 20   SpO2 99 %   Pulse Oximetry Type Continuous   BP (!) 146/81   MAP (mmHg) 107   BP Location Left arm   BP Method Automatic   Patient Position Lying     HD tx completed and pt tolerated well.  Tx time: 2hrs.  Net UF : 3L as ordered.  Pt is alert and stable.

## 2024-09-03 NOTE — PROGRESS NOTES
[facility] Card[facility] Medical [facility]  Nephrology  Progress Note    Patient Name: Haroldo Dickinson  MRN: [id number]  Admission Date: 9/2/2024  Hospital Length of Stay: 0 days  Attending Provider: Denver Manuel*   Primary Care Physician: Mireya Larose MD  Principal Problem:Acute hypoxemic respiratory failure    Subjective:     HPI: 48-year-old male with a past medical history of HTN compilcated by CAD (s/p HILLARY to RCA in 2020), HFpEF, ESRD on dialysis (MWF), previous RCC (s/p bilateral nephrectomies), T2DM, and hx of CVA (w/ residual R-sided deficits) who presented to [facility] ED on 9/2/24 with chest pain. Patient reported a tingling sensation in his left upper chest and left arm which is worse while lying flat and improves with sitting forward. Additionally patient reported associated difficulty breathing and cough. Patient last completed dialysis on the morning prior to arriving in the ED. Patient endorsed that he experiences these symptoms when he is fluid overloaded and requires dialysis.     ED Course  In the emergency department, /83, HR 72, and sPO2 95% on HFNC. Workup consistent with leukocytosis (WBC 14), CMP consistent with ESRD and stable electrolytes, BNP 1900 (increased from baseline), and trop #1 0.095 and trop #2 0.1(though improved from baseline 12). EKG NSR w/o ST depressions or elevations. CXR with significant pulmonary oedema. Patient evaluated by nephrology and recommended to be admitted for dialysis. Patient initially admitted to  for HD.   Nephrology consulted for HD    Interval History: Completed HD this morning, no acute events.    Review of patient's allergies indicates:   Allergen Reactions    No known allergies      Current Facility-Administered Medications   Medication Frequency    acetaminophen tablet 650 mg Q6H PRN    albuterol-ipratropium 2.5 mg-0.5 mg/3 mL nebulizer solution 3 mL Q4H    aspirin EC tablet 81 mg Daily    atorvastatin tablet 80 mg Daily    benzonatate capsule 100 mg TID  PRN    carvediloL tablet 50 mg BID WM    cinacalcet tablet 90 mg Daily with breakfast    clopidogreL tablet 75 mg Daily    dextrose 10% bolus 125 mL 125 mL PRN    dextrose 10% bolus 250 mL 250 mL PRN    ezetimibe tablet 10 mg Daily    guaiFENesin 100 mg/5 ml syrup 200 mg Q4H PRN    heparin (porcine) injection 5,000 Units Q8H    hydrALAZINE tablet 100 mg Q8H    insulin aspart U-100 pen 0-5 Units QID (AC + HS) PRN    isosorbide mononitrate 24 hr tablet 120 mg Daily    melatonin tablet 6 mg Nightly PRN    NIFEdipine 24 hr tablet 90 mg Daily    nitroGLYCERIN SL tablet 0.4 mg Q5 Min PRN    polyethylene glycol packet 17 g Daily    sodium chloride 0.9% flush 10 mL PRN     Facility-Administered Medications Ordered in Other Encounters   Medication Frequency    lidocaine (PF) 10 mg/ml (1%) injection 10 mg Once       Objective:     Vital Signs (Most Recent):  Temp: 98.2 °F (36.8 °C) (09/03/24 0415)  Pulse: 65 (09/03/24 0939)  Resp: (!) 24 (09/03/24 0939)  BP: (!) 166/92 (09/03/24 0900)  SpO2: (!) 93 % (09/03/24 0939) Vital Signs (24h Range):  Temp:  [98.2 °F (36.8 °C)-100.4 °F (38 °C)] 98.2 °F (36.8 °C)  Pulse:  [64-86] 65  Resp:  [5-36] 24  SpO2:  [87 %-100 %] 93 %  BP: (143-208)/() 166/92     Weight: 69.4 kg (153 lb) (09/03/24 0052)  Body mass index is 23.96 kg/m².  Body surface area is 1.81 meters squared.    I/O last 3 completed shifts:  In: 120 [P.O.:120]  Out: 3500 [Other:3500]     Physical Exam  Constitutional:       Appearance: Normal appearance.   HENT:      Head: Normocephalic and atraumatic.      Right Ear: External ear normal.      Left Ear: External ear normal.      Nose: Nose normal.      Mouth/Throat:      Mouth: Mucous membranes are moist.   Pulmonary:      Effort: Pulmonary effort is normal. No respiratory distress.      Breath sounds: No wheezing, rhonchi or rales.   Skin:     General: Skin is warm and dry.      Capillary Refill: Capillary refill takes less than 2 seconds.   Neurological:      General:  No focal deficit present.      Mental Status: He is alert and oriented to person, place, and time.          Significant Labs:  CBC:   Recent Labs   Lab 09/03/24  0407   WBC 14.26*   RBC 3.71*   HGB 11.0*   HCT 35.0*      MCV 94   MCH 29.6   MCHC 31.4*     CMP:   Recent Labs   Lab 09/03/24  0407   GLU 82   CALCIUM 10.4   ALBUMIN 3.5   PROT 9.0*      K 3.6   CO2 23      BUN 17   CREATININE 4.1*   ALKPHOS 77   ALT 10   AST 12   BILITOT 1.8*     All labs within the past 24 hours have been reviewed.     Significant Imaging:    Assessment/Plan:     Renal/  ESRD on hemodialysis  48 y.o. Black or  Male ESRD-HD  presents to ED on 9/2/2024 with diagnosis of: Chest discomfort [R07.89];Hypoxia [R09.02];NSTEMI (non-ST elevated myocardial infarction) [I21.4];Chest pain [R07.9]   Nephrology consulted for inpatient ESRD-HD management      Assessment:   - Will plan for HD tomorrow.   - Pre and Post-HD weights   - Continue to monitor intake and output      Anemia of ESRD   Recent Labs   Lab 08/28/24  0000 09/02/24  1223 09/02/24 2031 09/03/24  0407   WBC  --  14.01*  --  14.26*   HGB 9.2* 9.9*  --  11.0*   HCT  --  32.3* 33* 35.0*   PLT  --  300  --  326       Lab Results   Component Value Date    FESATURATED 66 (H) 11/27/2023    FERRITIN 1,531 (H) 11/27/2023       - Goal in ESRD is Hgb of 10-11.     Mineral Bone Disease in ESRD   Lab Results   Component Value Date     (H) 08/14/2024    CALCIUM 10.4 09/03/2024    ALBUMIN 3.5 09/03/2024    PHOS 2.1 (L) 09/03/2024            Thank you for your consult. I will follow-up with patient. Please contact us if you have any additional questions.    Faisal Roberson MD  Nephrology  [facility]

## 2024-09-03 NOTE — PROGRESS NOTES
[facility]  Critical Care Medicine  Progress Note    Patient Name: Haroldo Dickinson  MRN: [id number]  Admission Date: 9/2/2024  Hospital Length of Stay: 0 days  Code Status: Full Code  Attending Provider: Denver Manuel*  Primary Care Provider: Mireya Larose MD   Principal Problem: Acute hypoxemic respiratory failure    Subjective:     HPI:  48-year-old male with a past medical history of HTN compilcated by CAD (s/p HILLARY to RCA in 2020), HFpEF, ESRD on dialysis (MWF), previous RCC (s/p bilateral nephrectomies), T2DM, and hx of CVA (w/ residual R-sided deficits) who presented to [facility] ED on 9/2/24 with chest pain. Patient reported a tingling sensation in his left upper chest and left arm which is worse while lying flat and improves with sitting forward. Additionally patient reported associated difficulty breathing and cough. Patient last completed dialysis on the morning prior to arriving in the ED. Patient endorsed that he experiences these symptoms when he is fluid overloaded and requires dialysis.     ED Course  In the emergency department, /83, HR 72, and sPO2 95% on HFNC. Workup consistent with leukocytosis (WBC 14), CMP consistent with ESRD and stable electrolytes, BNP 1900 (increased from baseline), and trop #1 0.095 and trop #2 0.1(though improved from baseline 12). EKG NSR w/o ST depressions or elevations. CXR with significant pulmonary oedema. Patient evaluated by nephrology and recommended to be admitted for dialysis. Patient initially admitted to  for HD.    Hosptal Course:  Prior to leaving ED for floor patient had noted /104 and MICU consulted for nitro drip. Patient transferred to MICU and nitro not initiated as patient requires HD.         Hospital/ICU Course:  Haroldo Dickinson with a history of HTN compilcated by CAD (s/p HILLARY to RCA in 2020), HFpEF, ESRD on dialysis (MWF), previous RCC (s/p bilateral nephrectomies), T2DM, and hx of CVA (w/ residual R-sided deficits)  admitted to the MICU secondary to fluid overload and BP of 201/104. Patient received dialysis overnight symptoms improved significantly. Cardiology did not think his symptoms were ACS related and recommended continuing home medications. He was weaned off HFNC to NC the day after dialysis. Requesting stepdown at this time.     Interval History/Significant Events: NAEON. Patient received dialysis and feels much better this AM. Able to wean off HFNC to NC. Will stepdown today.       Past Medical History:   Diagnosis Date    CAD (coronary artery disease), native coronary artery 11/21/2019    Cataract     Diabetes mellitus     Diabetic retinopathy     Dialysis patient     DM type 2 causing renal disease, not at goal     ESRD (end stage renal disease) started dialysis 01/2014 06/05/2014    History of colon polyps 02/10/2021    History of COVID-19 12/29/2022    Hyperparathyroidism, secondary renal 06/05/2014    Hypertension     NSTEMI (non-ST elevated myocardial infarction) 12/21/2013    Organ transplant candidate 06/05/2014    Pleural effusion 06/07/2024    Pneumonia     Post PTCA 08/26/2020    RCA HILLARY 7-25-20    Renal cell carcinoma of right kidney     Renal hypertension     Stroke        Past Surgical History:   Procedure Laterality Date    ANGIOGRAM, CORONARY, WITH LEFT HEART CATHETERIZATION N/A 7/1/2021    Procedure: Angiogram, Coronary, with Left Heart Cath;  Surgeon: Miki Zendejas MD;  Location: [facility] CATH LAB;  Service: Cardiology;  Laterality: N/A;    ANGIOGRAM, CORONARY, WITH LEFT HEART CATHETERIZATION N/A 11/28/2023    Procedure: Angiogram, Coronary, with Left Heart Cath;  Surgeon: Noah Pendleton MD;  Location: [facility] CATH LAB;  Service: Cardiology;  Laterality: N/A;    COLONOSCOPY N/A 5/3/2017    Procedure: COLONOSCOPY;  Surgeon: Rufino Carpenter MD;  [facility]  Service: Endoscopy;  Laterality: N/A;  pt states can only schedule on Wednesdays    COLONOSCOPY N/A 2/9/2021    Procedure:  COLONOSCOPY;  Surgeon: Edil Wong MD;  Location: [facility] ENDO (4TH FLR);  Service: Endoscopy;  Laterality: N/A;  Dialysis MWF/ labwork day of procedure  right arm aceess  per Dr. Colin pt can hold Plavix 5 days prior see note- sm  COVID test on 2/6/21 at [facility] -     COLONOSCOPY N/A 2/10/2021    Procedure: COLONOSCOPY;  Surgeon: Robert Ayers MD;  Location: [facility] ENDO (4TH FLR);  Service: Endoscopy;  Laterality: N/A;  rescheduled due to poor bowel prep-BB  negative covid screening 2/6/21-BB  dialysis M-W-F-BB  okay to r/s for 2/9/21 and to hold Plavix per Dr. KIRAN Wong-BB  labs same day-BB    CORONARY STENT PLACEMENT N/A 7/25/2019    Procedure: INSERTION, STENT, CORONARY ARTERY;  Surgeon: Miki Zendejas MD;  Location: [facility] CATH LAB;  Service: Cardiology;  Laterality: N/A;    DIALYSIS FISTULA CREATION      FISTULOGRAM N/A 2/18/2019    Procedure: Fistulogram;  Surgeon: Yaneth De La Cruz MD;  Location: [facility] CATH LAB;  Service: Cardiology;  Laterality: N/A;    FISTULOGRAM Right 7/23/2019    Procedure: Fistulogram;  Surgeon: Oz Cordoba MD;  Location: [facility] CATH LAB;  Service: Cardiology;  Laterality: Right;    LAPAROSCOPIC ROBOT-ASSISTED SURGICAL REMOVAL OF KIDNEY USING DA JAIME XI Right 5/19/2022    Procedure: XI ROBOTIC NEPHRECTOMY;  Surgeon: Aidan Hendricks MD;  Location: [address];  Service: Urology;  Laterality: Right;  3hrs    LAPAROSCOPIC ROBOT-ASSISTED SURGICAL REMOVAL OF KIDNEY USING DA JAIME XI Left 1/5/2023    Procedure: XI ROBOTIC NEPHRECTOMY;  Surgeon: Aidan Hendricks MD;  [address];  Service: Urology;  Laterality: Left;  4 hrs    LEFT HEART CATHETERIZATION Left 7/25/2019    Procedure: Left heart cath;  Surgeon: Miki Zendejas MD;  Location: [facility] CATH LAB;  Service: Cardiology;  Laterality: Left;    REPAIR  1/5/2023    Procedure: REPAIR; COLOTOMY;  Surgeon: Maikel Lamb MD;  Location: [facility] OR 2ND FLR;  Service: General;;    RETINAL LASER PROCEDURE Bilateral  2018 or 2017    Dr. Rothman    VASCULAR SURGERY         Review of patient's allergies indicates:   Allergen Reactions    No known allergies        Family History       Problem Relation (Age of Onset)    Cancer Maternal Grandfather    Cataracts Mother    Diabetes Mother    Heart disease Brother    Hypertension Mother, Father, Sister, Brother    Kidney disease Brother          Tobacco Use    Smoking status: Never    Smokeless tobacco: Never   Substance and Sexual Activity    Alcohol use: Not Currently     Comment: One drink a month    Drug use: No    Sexual activity: Yes     Partners: Female     Birth control/protection: None      Review of Systems   Constitutional:  Negative for fatigue and fever.   HENT:  Negative for rhinorrhea and sore throat.    Eyes:  Negative for photophobia and visual disturbance.   Respiratory:  Positive for cough. Negative for chest tightness and shortness of breath.    Cardiovascular:  Negative for chest pain and palpitations.   Gastrointestinal:  Negative for abdominal pain, nausea and vomiting.   Genitourinary:  Negative for dysuria and flank pain.   Musculoskeletal:  Negative for back pain and neck pain.   Skin:  Negative for rash.   Neurological:  Negative for weakness and headaches.   Hematological:  Does not bruise/bleed easily.   Psychiatric/Behavioral:  Negative for confusion and decreased concentration.      Objective:     Vital Signs (Most Recent):  Temp: 98.2 °F (36.8 °C) (09/03/24 0415)  Pulse: 63 (09/03/24 1141)  Resp: 20 (09/03/24 1141)  BP: (!) 146/75 (09/03/24 1000)  SpO2: 98 % (09/03/24 1141) Vital Signs (24h Range):  Temp:  [98.2 °F (36.8 °C)-100.4 °F (38 °C)] 98.2 °F (36.8 °C)  Pulse:  [63-86] 63  Resp:  [5-36] 20  SpO2:  [87 %-100 %] 98 %  BP: (144-208)/() 146/75   Weight: 69.4 kg (153 lb)  Body mass index is 23.96 kg/m².      Intake/Output Summary (Last 24 hours) at 9/3/2024 1302  Last data filed at 9/3/2024 0900  Gross per 24 hour   Intake 220 ml   Output 3500 ml    Net -3280 ml          Physical Exam  Vitals and nursing note reviewed.   Constitutional:       Appearance: Normal appearance. He is not ill-appearing, toxic-appearing or diaphoretic.   HENT:      Head: Normocephalic and atraumatic.      Nose: Nose normal.   Eyes:      Extraocular Movements: Extraocular movements intact.   Cardiovascular:      Rate and Rhythm: Normal rate and regular rhythm.      Pulses: Normal pulses.   Pulmonary:      Effort: Pulmonary effort is normal.      Breath sounds: Normal breath sounds.      Comments: On NC  Abdominal:      General: Abdomen is flat. There is no distension.      Palpations: Abdomen is soft.   Musculoskeletal:         General: No swelling or tenderness.      Cervical back: Normal range of motion.      Right lower leg: No edema.      Left lower leg: No edema.   Skin:     General: Skin is warm.      Capillary Refill: Capillary refill takes less than 2 seconds.      Coloration: Skin is not pale.      Findings: No erythema.   Neurological:      General: No focal deficit present.      Mental Status: He is alert and oriented to person, place, and time.   Psychiatric:         Mood and Affect: Mood normal.         Behavior: Behavior normal.            Vents:  Oxygen Concentration (%): 50 (09/03/24 0900)  Lines/Drains/Airways       Peripheral Intravenous Line  Duration                  Hemodialysis AV Fistula Right forearm -- days         Peripheral IV - Single Lumen 09/02/24 1223 20 G Anterior;Distal;Left Upper Arm 1 day                  Significant Labs:    CBC/Anemia Profile:  Recent Labs   Lab 09/02/24  1223 09/02/24 2031 09/03/24  0407   WBC 14.01*  --  14.26*   HGB 9.9*  --  11.0*   HCT 32.3* 33* 35.0*     --  326   MCV 96  --  94   RDW 16.1*  --  15.9*        Chemistries:  Recent Labs   Lab 09/02/24  1330 09/02/24  2028 09/03/24  0407    139 137   K 3.4* 4.1 3.6    104 103   CO2 24 23 23   BUN 26* 28* 17   CREATININE 6.0* 6.9* 4.1*   CALCIUM 10.0 9.9 10.4    ALBUMIN 3.2* 3.0* 3.5   PROT 7.6 7.5 9.0*   BILITOT 1.1* 1.3* 1.8*   ALKPHOS 67 67 77   ALT 8* 8* 10   AST 11 11 12   MG  --  1.8 2.0   PHOS  --   --  2.1*       All pertinent labs within the past 24 hours have been reviewed.    Significant Imaging: I have reviewed all pertinent imaging results/findings within the past 24 hours.    ABG  Recent Labs   Lab 09/02/24 2031   PH 7.473*   PO2 79*   PCO2 37.1   HCO3 27.2   BE 4*     Assessment/Plan:     Pulmonary  * Acute hypoxemic respiratory failure  48-year-old male with known HTN, CAD, T2DM, and ESRD on HD now admitted to MICU with acute hypoxic respiratory failure and NSTEMI in setting of ESRD fluid overload.    #Resp  Acute Hypoxemic Respiratory Failure  Admit CXR with diffuse pulmonary oedema  BNP 1900  Started on HFNC in ED     Patient is not on home oxygen. Supplemental oxygen was provided and noted- Oxygen Concentration (%):  [80] 80    Signs/symptoms of respiratory failure include- increased work of breathing. Contributing diagnoses includes - ESRD associated fluid overload Labs and images were reviewed. Patient Has recent ABG, which has been reviewed. Will treat underlying causes and adjust management of respiratory failure as follows- HFNC    ABG  Recent Labs   Lab 09/02/24 2031   PH 7.473*   PO2 79*   PCO2 37.1   HCO3 27.2   BE 4*        Plan   - Off HFNC and currently using NC   - Nepro consult for HD. Dialyzed last night and plans for dialysis tomorrow (9/4)    #CV  NSTEMI (non-ST elevated myocardial infarction)  Central chest pain radiating to L arm partially relieved by nitroglycerin, troponin 0.095 -> 0.101 in ED. Repeat 0.086. BNP 1.9k. CXR suggestive of pulmonary edema, L pleural effusion.      Plan  - consult cardiology, appreciate recs. Recommend treating hypertension and following up troponin for now  - nephrology consulted for HD, likely need to fluid removal  - trend troponin  - EKG if chest pain  - manage hypertension         - continue home  coreg, nifedipine         - continue hydralazine, imdur  - covid neg. F/u echo        Coronary artery disease of native artery of native heart with stable angina pectoris  Patient with known CAD s/p stent placement, which is controlled Will continue ASA, Plavix, and Statin and monitor for S/Sx of angina/ACS. Continue to monitor on telemetry. s/p HILLARY of RCA 2020 with coronary angio in Nov 2023 with three vessel disease (LAD 90%, %, LAD 75%)     Plan  - consult cardiology, appreciate recs  - management of HTN  - continue DAPT, statin  - PRN nitro        Essential hypertension  Chronic, uncontrolled. Latest blood pressure and vitals reviewed-     While in the hospital, will manage blood pressure as follows; Adjust home antihypertensive regimen as follows-              - home nifedipine, carvedilol, hydralazine     Will utilize p.r.n. blood pressure medication only if patient's blood pressure greater than 180/110 and he develops symptoms such as worsening chest pain or shortness of breath.       #Neuro  No acute concerns    #FEN/GI  Type 2 diabetes mellitus with chronic kidney disease on chronic dialysis, without long-term current use of insulin  Creatine stable for now. BMP reviewed- noted Estimated Creatinine Clearance: 14.4 mL/min (A) (based on SCr of 6 mg/dL (H)). according to latest data. Based on current GFR, CKD stage is end stage.  Monitor UOP and serial BMP and adjust therapy as needed. Renally dose meds. Avoid nephrotoxic medications and procedures.     A1c July 2024 4.3%     Plan  - low dose SS insulin       Hyperparathyroidism, secondary renal  Continue home cinacalcet    #Renal  ESRD on hemodialysis  Creatine stable for now. BMP reviewed- noted Estimated Creatinine Clearance: 14.4 mL/min (A) (based on SCr of 6 mg/dL (H)). according to latest data. Based on current GFR, CKD stage is end stage.  Monitor UOP and serial BMP and adjust therapy as needed. Renally dose meds. Avoid nephrotoxic medications  and procedures.     Plan  - nephrology consulted, appreciate recs  - HD via R AVF, last dialyzed 9/2/24  - possible fluid overload, may need to draw off fluid with iHD per nephro  - Nephrology plans for dialysis 9/4    #Heme/ID  Anemia in end-stage renal disease  Anemia is likely due to chronic disease due to ESRD. Most recent hemoglobin 9     Plan  - Monitor serial CBC: Daily  - Transfuse PRBC if patient becomes hemodynamically unstable, symptomatic or H/H drops below 7/21.  - Patient has not received any PRBC transfusions to date  - Patient's anemia is currently stable       Code: Full  Access: PIV  Social: No concerns  Dispo: Stepdown         Critical Care Daily Checklist:    A: Awake: RASS Goal/Actual Goal: RASS Goal: 0-->alert and calm  Actual:     B: Spontaneous Breathing Trial Performed?     C: SAT & SBT Coordinated?  N/a                      D: Delirium: CAM-ICU Overall CAM-ICU: Negative   E: Early Mobility Performed? Yes   F: Feeding Goal:    Status:     Current Diet Order   Procedures    Diet Renal Fluid - 1500mL     Order Specific Question:   Fluid restriction:     Answer:   Fluid - 1500mL      AS: Analgesia/Sedation Tylenol prn   T: Thromboembolic Prophylaxis heparin   H: HOB > 300 Yes   U: Stress Ulcer Prophylaxis (if needed) none   G: Glucose Control ldssi   B: Bowel Function     I: Indwelling Catheter (Lines & Rossi) Necessity PIV, HD AV fistula right forearm   D: De-escalation of Antimicrobials/Pharmacotherapies None    Plan for the day/ETD stepdown    Code Status:  Family/Goals of Care: Full Code         Critical secondary to Patient has a condition that poses threat to life and bodily function: Severe Respiratory Distress in setting of ESRD fluid overload requiring HD and HFNC      Critical care was time spent personally by me on the following activities: development of treatment plan with patient or surrogate and bedside caregivers, discussions with consultants, evaluation of patient's response to  treatment, examination of patient, ordering and performing treatments and interventions, ordering and review of laboratory studies, ordering and review of radiographic studies, pulse oximetry, re-evaluation of patient's condition. This critical care time did not overlap with that of any other provider or involve time for any procedures.     Kevin Wall MD  Critical Care Medicine  [facility] - Cardiac [facility]

## 2024-09-03 NOTE — PLAN OF CARE
MICU DAILY GOALS     Family/Goals of care/Code Status   Code Status: Full Code    24H Vital Sign Range  Temp:  [98.2 °F (36.8 °C)-100.4 °F (38 °C)]   Pulse:  [59-86]   Resp:  [5-33]   BP: (117-208)/()   SpO2:  [87 %-100 %]      Shift Events (include procedures and significant events)   Patient on AIRVO at start of shift 40L/60%. Patient weaned to 2L NC. Patient reported nausea. MD notified and PRN zofran administered per MD order. Patient VSS throughout shift. Patient has stepdown orders.      AWAKE RASS: Goal - RASS Goal: 0-->alert and calm  Actual - RASS (Vee Agitation-Sedation Scale): alert and calm    Restraint necessity: Not necessary   BREATHE SBT: Not intubated    Coordinate A & B, analgesics/sedatives Pain: managed   SAT: Not intubated   Delirium CAM-ICU: Overall CAM-ICU: Negative   Early(intubated/ Progressive (non-intubated) Mobility MOVE Screen (INTUBATED ONLY): Not intubated    Activity: Activity Management: Sitting at edge of bed - L2   Feeding/Nutrition Diet order: Diet/Nutrition Received: 2 gram sodium, other (see comments) (cardiac diet),     Thrombus DVT prophylaxis: VTE Required Core Measure: Pharmacological prophylaxis initiated/maintained   HOB Elevation Head of Bed (HOB) Positioning: HOB at 30-45 degrees   Ulcer Prophylaxis GI: yes   Glucose control managed Glycemic Management: blood glucose monitored   Skin Skin assessed during: Daily Assessment    Sacrum intact/not altered? Yes  Heels intact/not altered? Yes  Surgical wound? No    CHECK ONE!   (no altered skin or altered skin) and sub boxes:  [x] No Altered Skin Integrity Present    [x]Prevention Measures Documented    [] Altered Skin Integrity Present or Discovered   [] LDA present in EPIC, daily doc completed              [] LDA added if not in EPIC (describe wound).                    When describing wound, do not stage, use descriptive words only.    [] Wound Image Taken (required on admit,                   transfer/discharge  and every Tuesday)    Wound Care Consulted? No    4 EYES:  Attending Nurse (1st set of eyes): PORTIA Anand    Second RN/Staff Member (2nd set of eyes): PORTIA Claudio   Bowel Function constipation    Indwelling Catheter Necessity            De-escalation Antibiotics No        VS and assessment per flow sheet, patient progressing towards goals as tolerated, plan of care reviewed with patient, all concerns addressed, will continue to monitor.

## 2024-09-04 VITALS
SYSTOLIC BLOOD PRESSURE: 127 MMHG | TEMPERATURE: 99 F | BODY MASS INDEX: 23.84 KG/M2 | HEART RATE: 62 BPM | RESPIRATION RATE: 16 BRPM | HEIGHT: 67 IN | DIASTOLIC BLOOD PRESSURE: 75 MMHG | OXYGEN SATURATION: 98 % | WEIGHT: 151.88 LBS

## 2024-09-04 LAB
ALBUMIN SERPL BCP-MCNC: 2.8 G/DL (ref 3.5–5.2)
ALP SERPL-CCNC: 54 U/L (ref 55–135)
ALT SERPL W/O P-5'-P-CCNC: 8 U/L (ref 10–44)
ANION GAP SERPL CALC-SCNC: 11 MMOL/L (ref 8–16)
AST SERPL-CCNC: 9 U/L (ref 10–40)
BASOPHILS # BLD AUTO: 0.09 K/UL (ref 0–0.2)
BASOPHILS NFR BLD: 1.1 % (ref 0–1.9)
BILIRUB SERPL-MCNC: 0.9 MG/DL (ref 0.1–1)
BUN SERPL-MCNC: 49 MG/DL (ref 6–20)
CALCIUM SERPL-MCNC: 9.5 MG/DL (ref 8.7–10.5)
CHLORIDE SERPL-SCNC: 100 MMOL/L (ref 95–110)
CO2 SERPL-SCNC: 22 MMOL/L (ref 23–29)
CREAT SERPL-MCNC: 8.3 MG/DL (ref 0.5–1.4)
DIFFERENTIAL METHOD BLD: ABNORMAL
EOSINOPHIL # BLD AUTO: 0.4 K/UL (ref 0–0.5)
EOSINOPHIL NFR BLD: 4.5 % (ref 0–8)
ERYTHROCYTE [DISTWIDTH] IN BLOOD BY AUTOMATED COUNT: 15.7 % (ref 11.5–14.5)
EST. GFR  (NO RACE VARIABLE): 7.4 ML/MIN/1.73 M^2
GLUCOSE SERPL-MCNC: 117 MG/DL (ref 70–110)
HCT VFR BLD AUTO: 30.9 % (ref 40–54)
HGB BLD-MCNC: 9.6 G/DL (ref 14–18)
IMM GRANULOCYTES # BLD AUTO: 0.02 K/UL (ref 0–0.04)
IMM GRANULOCYTES NFR BLD AUTO: 0.3 % (ref 0–0.5)
LYMPHOCYTES # BLD AUTO: 1.6 K/UL (ref 1–4.8)
LYMPHOCYTES NFR BLD: 19.9 % (ref 18–48)
MAGNESIUM SERPL-MCNC: 2.1 MG/DL (ref 1.6–2.6)
MCH RBC QN AUTO: 29.6 PG (ref 27–31)
MCHC RBC AUTO-ENTMCNC: 31.1 G/DL (ref 32–36)
MCV RBC AUTO: 95 FL (ref 82–98)
MONOCYTES # BLD AUTO: 0.9 K/UL (ref 0.3–1)
MONOCYTES NFR BLD: 10.8 % (ref 4–15)
NEUTROPHILS # BLD AUTO: 5.1 K/UL (ref 1.8–7.7)
NEUTROPHILS NFR BLD: 63.4 % (ref 38–73)
NRBC BLD-RTO: 0 /100 WBC
PHOSPHATE SERPL-MCNC: 4.4 MG/DL (ref 2.7–4.5)
PLATELET # BLD AUTO: 316 K/UL (ref 150–450)
PMV BLD AUTO: 10 FL (ref 9.2–12.9)
POCT GLUCOSE: 87 MG/DL (ref 70–110)
POCT GLUCOSE: 90 MG/DL (ref 70–110)
POTASSIUM SERPL-SCNC: 4.3 MMOL/L (ref 3.5–5.1)
PROT SERPL-MCNC: 7.2 G/DL (ref 6–8.4)
RBC # BLD AUTO: 3.24 M/UL (ref 4.6–6.2)
SODIUM SERPL-SCNC: 133 MMOL/L (ref 136–145)
WBC # BLD AUTO: 7.97 K/UL (ref 3.9–12.7)

## 2024-09-04 PROCEDURE — 94640 AIRWAY INHALATION TREATMENT: CPT

## 2024-09-04 PROCEDURE — 63600175 PHARM REV CODE 636 W HCPCS: Performed by: STUDENT IN AN ORGANIZED HEALTH CARE EDUCATION/TRAINING PROGRAM

## 2024-09-04 PROCEDURE — 94761 N-INVAS EAR/PLS OXIMETRY MLT: CPT

## 2024-09-04 PROCEDURE — 25000003 PHARM REV CODE 250

## 2024-09-04 PROCEDURE — 63600175 PHARM REV CODE 636 W HCPCS

## 2024-09-04 PROCEDURE — 99232 SBSQ HOSP IP/OBS MODERATE 35: CPT | Mod: ,,, | Performed by: INTERNAL MEDICINE

## 2024-09-04 PROCEDURE — 27000221 HC OXYGEN, UP TO 24 HOURS

## 2024-09-04 PROCEDURE — 25000003 PHARM REV CODE 250: Performed by: STUDENT IN AN ORGANIZED HEALTH CARE EDUCATION/TRAINING PROGRAM

## 2024-09-04 PROCEDURE — 83735 ASSAY OF MAGNESIUM: CPT

## 2024-09-04 PROCEDURE — 80053 COMPREHEN METABOLIC PANEL: CPT

## 2024-09-04 PROCEDURE — 99238 HOSP IP/OBS DSCHRG MGMT 30/<: CPT | Mod: GC,,, | Performed by: INTERNAL MEDICINE

## 2024-09-04 PROCEDURE — 25000242 PHARM REV CODE 250 ALT 637 W/ HCPCS

## 2024-09-04 PROCEDURE — 85025 COMPLETE CBC W/AUTO DIFF WBC: CPT

## 2024-09-04 PROCEDURE — 80100014 HC HEMODIALYSIS 1:1

## 2024-09-04 PROCEDURE — 84100 ASSAY OF PHOSPHORUS: CPT

## 2024-09-04 PROCEDURE — 99900035 HC TECH TIME PER 15 MIN (STAT)

## 2024-09-04 RX ORDER — SODIUM CHLORIDE 9 MG/ML
INJECTION, SOLUTION INTRAVENOUS
Status: CANCELLED | OUTPATIENT
Start: 2024-09-04

## 2024-09-04 RX ORDER — AMOXICILLIN 250 MG
1 CAPSULE ORAL 2 TIMES DAILY
Status: DISCONTINUED | OUTPATIENT
Start: 2024-09-04 | End: 2024-09-04 | Stop reason: HOSPADM

## 2024-09-04 RX ORDER — HYDRALAZINE HYDROCHLORIDE 100 MG/1
100 TABLET, FILM COATED ORAL EVERY 8 HOURS
Qty: 90 TABLET | Refills: 11 | Status: SHIPPED | OUTPATIENT
Start: 2024-09-04 | End: 2025-09-04

## 2024-09-04 RX ORDER — SODIUM CHLORIDE 9 MG/ML
INJECTION, SOLUTION INTRAVENOUS ONCE
Status: CANCELLED | OUTPATIENT
Start: 2024-09-04 | End: 2024-09-04

## 2024-09-04 RX ORDER — MUPIROCIN 20 MG/G
OINTMENT TOPICAL 2 TIMES DAILY
Status: CANCELLED | OUTPATIENT
Start: 2024-09-04 | End: 2024-09-09

## 2024-09-04 RX ADMIN — IPRATROPIUM BROMIDE AND ALBUTEROL SULFATE 3 ML: 2.5; .5 SOLUTION RESPIRATORY (INHALATION) at 03:09

## 2024-09-04 RX ADMIN — EZETIMIBE 10 MG: 10 TABLET ORAL at 08:09

## 2024-09-04 RX ADMIN — ASPIRIN 81 MG: 81 TABLET, COATED ORAL at 08:09

## 2024-09-04 RX ADMIN — HEPARIN SODIUM 5000 UNITS: 5000 INJECTION INTRAVENOUS; SUBCUTANEOUS at 03:09

## 2024-09-04 RX ADMIN — HYDRALAZINE HYDROCHLORIDE 100 MG: 50 TABLET ORAL at 06:09

## 2024-09-04 RX ADMIN — IPRATROPIUM BROMIDE AND ALBUTEROL SULFATE 3 ML: 2.5; .5 SOLUTION RESPIRATORY (INHALATION) at 11:09

## 2024-09-04 RX ADMIN — ISOSORBIDE MONONITRATE 120 MG: 60 TABLET, EXTENDED RELEASE ORAL at 08:09

## 2024-09-04 RX ADMIN — HEPARIN SODIUM 5000 UNITS: 5000 INJECTION INTRAVENOUS; SUBCUTANEOUS at 06:09

## 2024-09-04 RX ADMIN — NIFEDIPINE 90 MG: 60 TABLET, FILM COATED, EXTENDED RELEASE ORAL at 08:09

## 2024-09-04 RX ADMIN — ATORVASTATIN CALCIUM 80 MG: 40 TABLET, FILM COATED ORAL at 08:09

## 2024-09-04 RX ADMIN — CLOPIDOGREL BISULFATE 75 MG: 75 TABLET ORAL at 08:09

## 2024-09-04 RX ADMIN — IPRATROPIUM BROMIDE AND ALBUTEROL SULFATE 3 ML: 2.5; .5 SOLUTION RESPIRATORY (INHALATION) at 08:09

## 2024-09-04 RX ADMIN — CARVEDILOL 50 MG: 25 TABLET, FILM COATED ORAL at 08:09

## 2024-09-04 RX ADMIN — CINACALCET 90 MG: 30 TABLET, FILM COATED ORAL at 08:09

## 2024-09-04 NOTE — PLAN OF CARE
Per medical management tx team, wrong patient for pain management clinic.  SW has notified CHW team 1 of error.    Tracy Virk LMSW  [facility]  [phone]

## 2024-09-04 NOTE — PROGRESS NOTES
Report received from primary nurse. Bedside HD treatment started via RFA fistula with 15g needles BFR@ 400.

## 2024-09-04 NOTE — PROGRESS NOTES
[facility] - Card[facility] Medical [facility]  Nephrology  Progress Note    Patient Name: Haroldo Dickinson  MRN: [id number]  Admission Date: 9/2/2024  Hospital Length of Stay: 1 days  Attending Provider: Denver Manuel*   Primary Care Physician: Mireya Larose MD  Principal Problem:Acute hypoxemic respiratory failure    Subjective:     HPI: 48-year-old male with a past medical history of HTN compilcated by CAD (s/p HILLARY to RCA in 2020), HFpEF, ESRD on dialysis (MWF), previous RCC (s/p bilateral nephrectomies), T2DM, and hx of CVA (w/ residual R-sided deficits) who [place] [facility] [place] with chest pain. Patient reported a tingling sensation in his left upper chest and left arm which is worse while lying flat and improves with sitting forward. Additionally patient reported associated difficulty breathing and cough. Patient last completed dialysis on the morning prior to arriving in the ED. Patient endorsed that he experiences these symptoms when he is fluid overloaded and requires dialysis.     ED Course  In the emergency department, /83, HR 72, and sPO2 95% on HFNC. Workup consistent with leukocytosis (WBC 14), CMP consistent with ESRD and stable electrolytes, BNP 1900 (increased from baseline), and trop #1 0.095 and trop #2 0.1(though improved from baseline 12). EKG NSR w/o ST depressions or elevations. CXR with significant pulmonary oedema. Patient evaluated by nephrology and recommended to be admitted for dialysis. Patient initially admitted to  for HD.   Nephrology consulted for HD    Interval History: No acute overnight events, will plan for HD today.     Review of patient's allergies indicates:   Allergen Reactions    No known allergies      Current Facility-Administered Medications   Medication Frequency    acetaminophen tablet 650 mg Q6H PRN    albuterol-ipratropium 2.5 mg-0.5 mg/3 mL nebulizer solution 3 mL Q4H    aspirin EC tablet 81 mg Daily    atorvastatin tablet 80 mg Daily    benzonatate capsule 100  mg TID PRN    carvediloL tablet 50 mg BID WM    cinacalcet tablet 90 mg Daily with breakfast    clopidogreL tablet 75 mg Daily    dextrose 10% bolus 125 mL 125 mL PRN    dextrose 10% bolus 250 mL 250 mL PRN    ezetimibe tablet 10 mg Daily    guaiFENesin 100 mg/5 ml syrup 200 mg Q4H PRN    heparin (porcine) injection 5,000 Units Q8H    hydrALAZINE tablet 100 mg Q8H    insulin aspart U-100 pen 0-5 Units QID (AC + HS) PRN    isosorbide mononitrate 24 hr tablet 120 mg Daily    melatonin tablet 6 mg Nightly PRN    NIFEdipine 24 hr tablet 90 mg Daily    nitroGLYCERIN SL tablet 0.4 mg Q5 Min PRN    ondansetron tablet 4 mg Q6H PRN    polyethylene glycol packet 17 g Daily    sodium chloride 0.9% flush 10 mL PRN     Facility-Administered Medications Ordered in Other Encounters   Medication Frequency    lidocaine (PF) 10 mg/ml (1%) injection 10 mg Once       Objective:     Vital Signs (Most Recent):  Temp: 98 °F (36.7 °C) (09/04/24 0338)  Pulse: 63 (09/04/24 0600)  Resp: 19 (09/04/24 0600)  BP: 126/66 (09/04/24 0604)  SpO2: 98 % (09/04/24 0600) Vital Signs (24h Range):  Temp:  [97.6 °F (36.4 °C)-98.8 °F (37.1 °C)] 98 °F (36.7 °C)  Pulse:  [57-69] 63  Resp:  [14-29] 19  SpO2:  [90 %-100 %] 98 %  BP: (107-166)/(58-92) 126/66     Weight: 68.9 kg (151 lb 14.4 oz) (09/04/24 0609)  Body mass index is 23.79 kg/m².  Body surface area is 1.8 meters squared.    I/O last 3 completed shifts:  In: 710 [P.O.:710]  Out: 3500 [Other:3500]     Physical Exam  Constitutional:       Appearance: Normal appearance.   HENT:      Head: Normocephalic and atraumatic.      Right Ear: External ear normal.      Left Ear: External ear normal.      Nose: Nose normal.      Mouth/Throat:      Mouth: Mucous membranes are moist.   Cardiovascular:      Rate and Rhythm: Normal rate and regular rhythm.   Pulmonary:      Effort: Pulmonary effort is normal. No respiratory distress.      Breath sounds: No wheezing, rhonchi or rales.   Skin:     General: Skin is warm  and dry.      Capillary Refill: Capillary refill takes less than 2 seconds.   Neurological:      General: No focal deficit present.      Mental Status: He is alert and oriented to person, place, and time.          Significant Labs:  CBC:   Recent Labs   Lab 09/04/24  0320   WBC 7.97   RBC 3.24*   HGB 9.6*   HCT 30.9*      MCV 95   MCH 29.6   MCHC 31.1*     CMP:   Recent Labs   Lab 09/04/24  0320   *   CALCIUM 9.5   ALBUMIN 2.8*   PROT 7.2   *   K 4.3   CO2 22*      BUN 49*   CREATININE 8.3*   ALKPHOS 54*   ALT 8*   AST 9*   BILITOT 0.9     All labs within the past 24 hours have been reviewed.     Significant Imaging:    Assessment/Plan:     Renal/  ESRD on hemodialysis  48 y.o. Black or  Male ESRD-HD  presents to ED on 9/2/2024 with diagnosis of: Chest discomfort [R07.89];Hypoxia [R09.02];NSTEMI (non-ST elevated myocardial infarction) [I21.4];Chest pain [R07.9]   Nephrology consulted for inpatient ESRD-HD management      Assessment:   - Will plan for HD on MWF.   - Pre and Post-HD weights   - Continue to monitor intake and output      Anemia of ESRD   Recent Labs   Lab 09/02/24  1223 09/02/24 2031 09/03/24  0407 09/04/24  0320   WBC 14.01*  --  14.26* 7.97   HGB 9.9*  --  11.0* 9.6*   HCT 32.3* 33* 35.0* 30.9*     --  326 316       Lab Results   Component Value Date    FESATURATED 66 (H) 11/27/2023    FERRITIN 1,531 (H) 11/27/2023       - Goal in ESRD is Hgb of 10-11.     Mineral Bone Disease in ESRD   Lab Results   Component Value Date     (H) 08/14/2024    CALCIUM 9.5 09/04/2024    ALBUMIN 2.8 (L) 09/04/2024    PHOS 4.4 09/04/2024            Thank you for your consult. I will follow-up with patient. Please contact us if you have any additional questions.    Faisal Roberson MD  Nephrology  [facility] - Cardiac Medical ICU

## 2024-09-04 NOTE — ASSESSMENT & PLAN NOTE
48 y.o. Black or  Male ESRD-HD  presents to ED on 9/2/2024 with diagnosis of: Chest discomfort [R07.89];Hypoxia [R09.02];NSTEMI (non-ST elevated myocardial infarction) [I21.4];Chest pain [R07.9]   Nephrology consulted for inpatient ESRD-HD management      Assessment:   - Will plan for HD on MWF.   - Pre and Post-HD weights   - Continue to monitor intake and output      Anemia of ESRD   Recent Labs   Lab 09/02/24  1223 09/02/24  2031 09/03/24  0407 09/04/24  0320   WBC 14.01*  --  14.26* 7.97   HGB 9.9*  --  11.0* 9.6*   HCT 32.3* 33* 35.0* 30.9*     --  326 316       Lab Results   Component Value Date    FESATURATED 66 (H) 11/27/2023    FERRITIN 1,531 (H) 11/27/2023       - Goal in ESRD is Hgb of 10-11.     Mineral Bone Disease in ESRD   Lab Results   Component Value Date     (H) 08/14/2024    CALCIUM 9.5 09/04/2024    ALBUMIN 2.8 (L) 09/04/2024    PHOS 4.4 09/04/2024

## 2024-09-04 NOTE — PLAN OF CARE
MICU DAILY GOALS     Family/Goals of care/Code Status   Code Status: Full Code    24H Vital Sign Range  Temp:  [97.6 °F (36.4 °C)-98.8 °F (37.1 °C)]   Pulse:  [57-69]   Resp:  [10-29]   BP: (107-166)/(58-92)   SpO2:  [90 %-100 %]      Shift Events (include procedures and significant events)   No acute events throughout shift    AWAKE RASS: Goal - RASS Goal: 0-->alert and calm  Actual - RASS (Vee Agitation-Sedation Scale): alert and calm    Restraint necessity: Not necessary   BREATHE SBT: Not intubated    Coordinate A & B, analgesics/sedatives Pain: managed   SAT: Not intubated   Delirium CAM-ICU: Overall CAM-ICU: Negative   Early(intubated/ Progressive (non-intubated) Mobility MOVE Screen (INTUBATED ONLY): Not intubated    Activity: Activity Management: Rolling - L1   Feeding/Nutrition Diet order: Diet/Nutrition Received: 2 gram sodium,     Thrombus DVT prophylaxis: VTE Required Core Measure: Pharmacological prophylaxis initiated/maintained   HOB Elevation Head of Bed (HOB) Positioning: HOB at 30-45 degrees   Ulcer Prophylaxis GI: no   Glucose control managed Glycemic Management: blood glucose monitored   Skin Skin assessed during: Daily Assessment    Sacrum intact/not altered? Yes  Heels intact/not altered? Yes  Surgical wound? No    CHECK ONE!   (no altered skin or altered skin) and sub boxes:  [x] No Altered Skin Integrity Present    [x]Prevention Measures Documented    [] Altered Skin Integrity Present or Discovered   [] LDA present in EPIC, daily doc completed              [] LDA added if not in EPIC (describe wound).                    When describing wound, do not stage, use descriptive words only.    [] Wound Image Taken (required on admit,                   transfer/discharge and every Tuesday)    Wound Care Consulted? No    4 EYES:  Attending Nurse (1st set of eyes):     Second RN/Staff Member (2nd set of eyes):    Bowel Function no issues    Indwelling Catheter Necessity    Anuric..       De-escalation Antibiotics NA        VS and assessment per flow sheet, patient progressing towards goals as tolerated, plan of care reviewed with Haroldo Dickinson, all concerns addressed, will continue to monitor.

## 2024-09-04 NOTE — SUBJECTIVE & OBJECTIVE
Attempted to reach Iman Coates, left message for return call. Upon return call, please  add to encounter.    Interval History: No acute overnight events, will plan for HD today.     Review of patient's allergies indicates:   Allergen Reactions    No known allergies      Current Facility-Administered Medications   Medication Frequency    acetaminophen tablet 650 mg Q6H PRN    albuterol-ipratropium 2.5 mg-0.5 mg/3 mL nebulizer solution 3 mL Q4H    aspirin EC tablet 81 mg Daily    atorvastatin tablet 80 mg Daily    benzonatate capsule 100 mg TID PRN    carvediloL tablet 50 mg BID WM    cinacalcet tablet 90 mg Daily with breakfast    clopidogreL tablet 75 mg Daily    dextrose 10% bolus 125 mL 125 mL PRN    dextrose 10% bolus 250 mL 250 mL PRN    ezetimibe tablet 10 mg Daily    guaiFENesin 100 mg/5 ml syrup 200 mg Q4H PRN    heparin (porcine) injection 5,000 Units Q8H    hydrALAZINE tablet 100 mg Q8H    insulin aspart U-100 pen 0-5 Units QID (AC + HS) PRN    isosorbide mononitrate 24 hr tablet 120 mg Daily    melatonin tablet 6 mg Nightly PRN    NIFEdipine 24 hr tablet 90 mg Daily    nitroGLYCERIN SL tablet 0.4 mg Q5 Min PRN    ondansetron tablet 4 mg Q6H PRN    polyethylene glycol packet 17 g Daily    sodium chloride 0.9% flush 10 mL PRN     Facility-Administered Medications Ordered in Other Encounters   Medication Frequency    lidocaine (PF) 10 mg/ml (1%) injection 10 mg Once       Objective:     Vital Signs (Most Recent):  Temp: 98 °F (36.7 °C) (09/04/24 0338)  Pulse: 63 (09/04/24 0600)  Resp: 19 (09/04/24 0600)  BP: 126/66 (09/04/24 0604)  SpO2: 98 % (09/04/24 0600) Vital Signs (24h Range):  Temp:  [97.6 °F (36.4 °C)-98.8 °F (37.1 °C)] 98 °F (36.7 °C)  Pulse:  [57-69] 63  Resp:  [14-29] 19  SpO2:  [90 %-100 %] 98 %  BP: (107-166)/(58-92) 126/66     Weight: 68.9 kg (151 lb 14.4 oz) (09/04/24 0609)  Body mass index is 23.79 kg/m².  Body surface area is 1.8 meters squared.    I/O last 3 completed shifts:  In: 710 [P.O.:710]  Out: 3500 [Other:3500]     Physical Exam  Constitutional:       Appearance: Normal appearance.   HENT:       Head: Normocephalic and atraumatic.      Right Ear: External ear normal.      Left Ear: External ear normal.      Nose: Nose normal.      Mouth/Throat:      Mouth: Mucous membranes are moist.   Cardiovascular:      Rate and Rhythm: Normal rate and regular rhythm.   Pulmonary:      Effort: Pulmonary effort is normal. No respiratory distress.      Breath sounds: No wheezing, rhonchi or rales.   Skin:     General: Skin is warm and dry.      Capillary Refill: Capillary refill takes less than 2 seconds.   Neurological:      General: No focal deficit present.      Mental Status: He is alert and oriented to person, place, and time.          Significant Labs:  CBC:   Recent Labs   Lab 09/04/24  0320   WBC 7.97   RBC 3.24*   HGB 9.6*   HCT 30.9*      MCV 95   MCH 29.6   MCHC 31.1*     CMP:   Recent Labs   Lab 09/04/24  0320   *   CALCIUM 9.5   ALBUMIN 2.8*   PROT 7.2   *   K 4.3   CO2 22*      BUN 49*   CREATININE 8.3*   ALKPHOS 54*   ALT 8*   AST 9*   BILITOT 0.9     All labs within the past 24 hours have been reviewed.     Significant Imaging:     other/surgery/toddler/anes

## 2024-09-04 NOTE — PROGRESS NOTES
3hr HD treatment completed. 2L of fluid removed. Pt tolerated well. Report given to primary nurse at bedside.

## 2024-09-04 NOTE — PLAN OF CARE
Spoke to the representative Tiffanie  at the [facility] at [facility] to schedule the patient an appointment.  The appointment was scheduled for 9/13/24 at 2:00 pm.        Allen WOODARD, RSW  Case Management  [phone]

## 2024-09-04 NOTE — PLAN OF CARE
WILIAN has in basket the CHW team to notify that patient is discharging today and to schedule a PCP f/u.  Medical Management tx team will place an ambulatory referral to pain management clinic.  WILIAN also in basket CHW regarding f/u for appointment for pain management.    Tracy Virk, ADEEL  [facility]  [phone]

## 2024-09-04 NOTE — PLAN OF CARE
Problem: Adult Inpatient Plan of Care  Goal: Plan of Care Review  Outcome: Progressing  Goal: Patient-Specific Goal (Individualized)  Outcome: Progressing  Goal: Absence of Hospital-Acquired Illness or Injury  Outcome: Progressing  Goal: Optimal Comfort and Wellbeing  Outcome: Progressing  Goal: Readiness for Transition of Care  Outcome: Progressing     Problem: Diabetes Comorbidity  Goal: Blood Glucose Level Within Targeted Range  Outcome: Progressing     Problem: Hemodialysis  Goal: Safe, Effective Therapy Delivery  Outcome: Progressing  Goal: Effective Tissue Perfusion  Outcome: Progressing  Goal: Absence of Infection Signs and Symptoms  Outcome: Progressing     Problem: Skin Injury Risk Increased  Goal: Skin Health and Integrity  Outcome: Progressing

## 2024-09-04 NOTE — DISCHARGE SUMMARY
[facility]  Critical Care Medicine  Discharge Summary      Patient Name: Haroldo Dickinson  MRN: [id number]  Admission Date: 9/2/2024  Hospital Length of Stay: 1 days  Discharge Date and Time:  09/04/2024 3:04 PM  Attending Physician: Denver Manuel*   Discharging Provider: Kevin Wall MD  Primary Care Provider: Mireya Larose MD  Reason for Admission: Fluid overload    HPI:   48-year-old male with a past medical history of HTN compilcated by CAD (s/p HILLARY to RCA in 2020), HFpEF, ESRD on dialysis (MWF), previous RCC (s/p bilateral nephrectomies), T2DM, and hx of CVA (w/ residual R-sided deficits) who presented to [facility] ED on 9/2/24 with chest pain. Patient reported a tingling sensation in his left upper chest and left arm which is worse while lying flat and improves with sitting forward. Additionally patient reported associated difficulty breathing and cough. Patient last completed dialysis on the morning prior to arriving in the ED. Patient endorsed that he experiences these symptoms when he is fluid overloaded and requires dialysis.     ED Course  In the emergency department, /83, HR 72, and sPO2 95% on HFNC. Workup consistent with leukocytosis (WBC 14), CMP consistent with ESRD and stable electrolytes, BNP 1900 (increased from baseline), and trop #1 0.095 and trop #2 0.1(though improved from baseline 12). EKG NSR w/o ST depressions or elevations. CXR with significant pulmonary oedema. Patient evaluated by nephrology and recommended to be admitted for dialysis. Patient initially admitted to  for HD.    Hosptal Course:  Prior to leaving ED for floor patient had noted /104 and MICU consulted for nitro drip. Patient transferred to MICU and nitro not initiated as patient requires HD.         * No surgery found *    Indwelling Lines/Drains at Time of Discharge:   Lines/Drains/Airways       Drain  Duration                  Hemodialysis AV Fistula Right forearm -- days                   Hospital Course:   Haroldo Dickinson with a history of HTN compilcated by CAD (s/p HILLARY to RCA in 2020), HFpEF, ESRD on dialysis (MWF), previous RCC (s/p bilateral nephrectomies), T2DM, and hx of CVA (w/ residual R-sided deficits) admitted to the MICU secondary to fluid overload and BP of 201/104. Patient received dialysis overnight symptoms improved significantly. Cardiology did not think his symptoms were ACS related and recommended continuing home medications. He was weaned off HFNC to NC the day after dialysis. 9/4, patient doing well, still on NC at 2L. Planned for dialysis this AM and will discharge afterwards.    Vitals:    09/04/24 1133 09/04/24 1201 09/04/24 1230 09/04/24 1245   BP:  130/66 115/63 117/69   BP Location:  Left arm     Patient Position:  Lying     Pulse: 64 63 65 69   Resp: 19 15 11 (!) 23   Temp:       TempSrc:       SpO2: 100% 97% (!) 91% (!) 92%   Weight:       Height:           Physical Exam  Vitals and nursing note reviewed.   Constitutional:       Appearance: Normal appearance. He is not ill-appearing, toxic-appearing or diaphoretic.   HENT:      Head: Normocephalic and atraumatic.      Nose: Nose normal.   Eyes:      Extraocular Movements: Extraocular movements intact.   Cardiovascular:      Rate and Rhythm: Normal rate and regular rhythm.      Pulses: Normal pulses.   Pulmonary:      Effort: Pulmonary effort is normal.      Breath sounds: Normal breath sounds.      Comments: On NC  Abdominal:      General: Abdomen is flat. There is no distension.      Palpations: Abdomen is soft.   Musculoskeletal:         General: No swelling or tenderness.      Cervical back: Normal range of motion.      Right lower leg: No edema.      Left lower leg: No edema.   Skin:     General: Skin is warm.      Capillary Refill: Capillary refill takes less than 2 seconds.      Coloration: Skin is not pale.      Findings: No erythema.   Neurological:      General: No focal deficit present.      Mental Status: He  is alert and oriented to person, place, and time.   Psychiatric:         Mood and Affect: Mood normal.         Behavior: Behavior normal.     Consults (From admission, onward)          Status Ordering Provider     Inpatient consult to Critical Care Medicine  Once        Provider:  (Not yet assigned)    Completed ALIZE BENTLEY     Inpatient consult to Cardiology  Once        Provider:  (Not yet assigned)    BREANN Martinez     Inpatient consult to Nephrology  Once        Provider:  (Not yet assigned)    BREANN Martinez          Significant Labs:  Recent Lab Results         09/04/24  0320   09/03/24  2136   09/03/24  1717        Albumin 2.8           ALP 54           ALT 8           Anion Gap 11           AST 9           Baso # 0.09           Basophil % 1.1           BILIRUBIN TOTAL 0.9  Comment: For infants and newborns, interpretation of results should be based  on gestational age, weight and in agreement with clinical  observations.    Premature Infant recommended reference ranges:  Up to 24 hours.............<8.0 mg/dL  Up to 48 hours............<12.0 mg/dL  3-5 days..................<15.0 mg/dL  6-29 days.................<15.0 mg/dL             BUN 49           Calcium 9.5           Chloride 100           CO2 22           Creatinine 8.3           Differential Method Automated           eGFR 7.4           Eos # 0.4           Eos % 4.5           Glucose 117           Gran # (ANC) 5.1           Gran % 63.4           Hematocrit 30.9           Hemoglobin 9.6           Immature Grans (Abs) 0.02  Comment: Mild elevation in immature granulocytes is non specific and   can be seen in a variety of conditions including stress response,   acute inflammation, trauma and pregnancy. Correlation with other   laboratory and clinical findings is essential.             Immature Granulocytes 0.3           Lymph # 1.6           Lymph % 19.9           Magnesium  2.1           MCH 29.6           MCHC 31.1            MCV 95           Mono # 0.9           Mono % 10.8           MPV 10.0           nRBC 0           Phosphorus Level 4.4           Platelet Count 316           POCT Glucose   120   118       Potassium 4.3           PROTEIN TOTAL 7.2           RBC 3.24           RDW 15.7           Sodium 133           WBC 7.97                 All pertinent labs within the past 24 hours have been reviewed.    Significant Imaging:  I have reviewed all pertinent imaging results/findings within the past 24 hours.    Pending Diagnostic Studies:       None          Final Active Diagnoses:    Diagnosis Date Noted POA    PRINCIPAL PROBLEM:  Acute hypoxemic respiratory failure [J96.01] 09/02/2024 Unknown    Triple vessel coronary artery disease [I25.10] 09/03/2024 Unknown    Type 2 diabetes mellitus with chronic kidney disease on chronic dialysis, without long-term current use of insulin [E11.22, N18.6, Z99.2] 03/10/2024 Not Applicable    Hypertensive emergency [I16.1] 11/27/2023 Yes    Anemia in end-stage renal disease [N18.6, D63.1] 06/14/2022 Yes    Essential hypertension [I10] 11/21/2019 Yes    Coronary artery disease of native artery of native heart with stable angina pectoris [I25.118] 11/21/2019 Yes    ESRD on hemodialysis [N18.6, Z99.2] 07/23/2019 Not Applicable    Hyperparathyroidism, secondary renal [N25.81] 06/05/2014 Yes    NSTEMI (non-ST elevated myocardial infarction) [I21.4] 12/21/2013 Yes      Problems Resolved During this Admission:     No new Assessment & Plan notes have been filed under this hospital service since the last note was generated.  Service: Critical Care Medicine    Discharged Condition: stable    Disposition: Home or Self Care      Patient Instructions:      Ambulatory referral/consult to Cardiology   Standing Status: Future   Referral Priority: Routine Referral Type: Consultation   Referral Reason: Specialty Services Required   Requested Specialty: Cardiology   Number of Visits Requested: 1      Medications:  Reconciled Home Medications:      Medication List        CHANGE how you take these medications      sevelamer carbonate 800 mg Tab  Commonly known as: RENVELA  Take 2 tablets (1,600 mg total) by mouth 3 (three) times daily with meals.  What changed: how much to take            CONTINUE taking these medications      aspirin 81 MG EC tablet  Commonly known as: ECOTRIN  Take 1 tablet (81 mg total) by mouth once daily.     atorvastatin 80 MG tablet  Commonly known as: LIPITOR  Take 1 tablet (80 mg total) by mouth once daily.     carvediloL 25 MG tablet  Commonly known as: COREG  Take 2 tablets (50 mg total) by mouth 2 (two) times daily with meals.     clopidogreL 75 mg tablet  Commonly known as: PLAVIX  Take 1 tablet (75 mg total) by mouth once daily.     epoetin philip 4,000 unit/mL injection  Commonly known as: PROCRIT  Inject 1.93 mLs (7,720 Units total) into the skin every Mon, Wed, Fri.     ezetimibe 10 mg tablet  Commonly known as: ZETIA  Take 1 tablet (10 mg total) by mouth once daily.     hydrALAZINE 100 MG tablet  Commonly known as: APRESOLINE  Take 1 tablet (100 mg total) by mouth every 8 (eight) hours.     isosorbide mononitrate 120 MG 24 hr tablet  Commonly known as: IMDUR  Take 1 tablet (120 mg total) by mouth once daily.     minoxidiL 2.5 MG tablet  Commonly known as: LONITEN  Take 2 tablets (5 mg total) by mouth 2 (two) times daily.     * MIRCERA INJ  75 mcg.     * MIRCERA INJ  100 mcg.     * MIRCERA INJ  150 mcg.     NIFEdipine 90 MG (OSM) 24 hr tablet  Commonly known as: PROCARDIA-XL  Take 1 tablet (90 mg total) by mouth once daily.     nitroGLYCERIN 0.4 MG SL tablet  Commonly known as: NITROSTAT  Place 1 tablet (0.4 mg total) under the tongue every 5 (five) minutes as needed for Chest pain.     polyethylene glycol 17 gram/dose powder  Commonly known as: GLYCOLAX  Take 17 g by mouth daily as needed for Constipation.     RENAPLEX 800 mcg- 12.5 mg Tab  Generic drug: vit B complex-C-folic  ac-zinc  Take 1 tablet by mouth.     SENSIPAR 60 mg Tab  Generic drug: cinacalcet  Take 60 mg by mouth daily with breakfast.     XPHOZAH 20 mg Tab  Generic drug: tenapanor  Take 20 mg by mouth 2 (two) times a day.           * This list has 3 medication(s) that are the same as other medications prescribed for you. Read the directions carefully, and ask your doctor or other care provider to review them with you.                STOP taking these medications      acetaminophen 650 MG Tbsr  Commonly known as: TYLENOL               Kevin Wall MD  Critical Care Medicine  [facility] - Cardiac Medical ICU

## 2024-09-05 NOTE — PLAN OF CARE
[facility] Medical [facility]  Discharge Final Note    Primary Care Provider: Mireya Larose MD    Expected Discharge Date: 9/4/2024    Final Discharge Note (most recent)       Final Note - 09/05/24 0922          Final Note    Assessment Type Final Discharge Note     Anticipated Discharge Disposition Home or Self Care     What phone number can be called within the next 1-3 days to see how you are doing after discharge? [phone]     Hospital Resources/Appts/Education Provided Provided patient/caregiver with written discharge plan information        Post-Acute Status    Discharge Delays None known at this time                     Important Message from Medicare        Future Appointments   Date Time Provider Department Center   9/13/2024  2:00 PM Nallely Lugo MD [facility]   9/18/2024 10:40 AM Mireya Larose MD [facility]          Patient discharged home with appts scheduled.  Family available for transportation.  Information given to him per medical staff for f/u with provider.      Tracy Virk LMSW  [facility]  [phone]

## 2024-09-18 DIAGNOSIS — I10 ESSENTIAL HYPERTENSION: Primary | ICD-10-CM

## 2024-09-18 RX ORDER — NIFEDIPINE 90 MG/1
90 TABLET, EXTENDED RELEASE ORAL DAILY
Qty: 90 TABLET | Refills: 3 | Status: SHIPPED | OUTPATIENT
Start: 2024-09-18 | End: 2025-09-18

## 2024-09-18 NOTE — TELEPHONE ENCOUNTER
Last Office Visit Info:   The patient's last visit with Mireya Larose MD was on 6/18/2024.    The patient's last visit in current department was on Visit date not found.        Last CBC Results:   Lab Results   Component Value Date    WBC 7.97 09/04/2024    HGB 9.4 (L) 09/10/2024    HCT 30.9 (L) 09/04/2024     09/04/2024       Last CMP Results  Lab Results   Component Value Date     (L) 09/04/2024    K 4.3 09/04/2024     09/04/2024    CO2 22 (L) 09/04/2024    BUN 49 (H) 09/04/2024    CREATININE 8.3 (H) 09/04/2024    CALCIUM 9.5 09/04/2024    ALBUMIN 2.8 (L) 09/04/2024    AST 9 (L) 09/04/2024    ALT 8 (L) 09/04/2024       Last Lipids  Lab Results   Component Value Date    CHOL 131 09/02/2024    TRIG 83 09/02/2024    HDL 67 09/02/2024    LDLCALC 47.4 (L) 09/02/2024       Last A1C  Lab Results   Component Value Date    HGBA1C 4.3 (L) 07/10/2024       Last TSH  Lab Results   Component Value Date    TSH 0.989 10/13/2015             Current Med Refills  Medication List with Changes/Refills   Current Medications    ASPIRIN (ECOTRIN) 81 MG EC TABLET    Take 1 tablet (81 mg total) by mouth once daily.       Start Date: 11/29/2023End Date: 11/28/2024    ATORVASTATIN (LIPITOR) 80 MG TABLET    Take 1 tablet (80 mg total) by mouth once daily.       Start Date: 11/30/2023End Date: 11/29/2024    CARVEDILOL (COREG) 25 MG TABLET    Take 2 tablets (50 mg total) by mouth 2 (two) times daily with meals.       Start Date: 6/12/2024 End Date: 6/7/2025    CLOPIDOGREL (PLAVIX) 75 MG TABLET    Take 1 tablet (75 mg total) by mouth once daily.       Start Date: 11/30/2023End Date: 12/17/2024    EPOETIN VERO (PROCRIT) 4,000 UNIT/ML INJECTION    Inject 1.93 mLs (7,720 Units total) into the skin every Mon, Wed, Fri.       Start Date: 6/14/2024 End Date: --    EZETIMIBE (ZETIA) 10 MG TABLET    Take 1 tablet (10 mg total) by mouth once daily.       Start Date: 6/27/2024 End Date: 6/27/2025    HYDRALAZINE (APRESOLINE) 100 MG  TABLET    Take 1 tablet (100 mg total) by mouth every 8 (eight) hours.       Start Date: 9/4/2024  End Date: 9/4/2025    ISOSORBIDE MONONITRATE (IMDUR) 120 MG 24 HR TABLET    Take 1 tablet (120 mg total) by mouth once daily.       Start Date: 6/12/2024 End Date: 6/12/2025    METHOXY PEG-EPOETIN BETA (MIRCERA INJ)    150 mcg.       Start Date: 6/19/2024 End Date: 6/18/2025    METHOXY PEG-EPOETIN BETA (MIRCERA INJ)    100 mcg.       Start Date: 5/22/2024 End Date: 5/21/2025    METHOXY PEG-EPOETIN BETA (MIRCERA INJ)    75 mcg.       Start Date: 4/24/2024 End Date: 4/23/2025    MINOXIDIL (LONITEN) 2.5 MG TABLET    Take 2 tablets (5 mg total) by mouth 2 (two) times daily.       Start Date: 6/12/2024 End Date: 6/12/2025    NIFEDIPINE (PROCARDIA-XL) 90 MG (OSM) 24 HR TABLET    Take 1 tablet (90 mg total) by mouth once daily.       Start Date: 6/2/2024  End Date: 6/2/2025    NITROGLYCERIN (NITROSTAT) 0.4 MG SL TABLET    Place 1 tablet (0.4 mg total) under the tongue every 5 (five) minutes as needed for Chest pain.       Start Date: 6/12/2024 End Date: 6/12/2025    POLYETHYLENE GLYCOL (GLYCOLAX) 17 GRAM/DOSE POWDER    Take 17 g by mouth daily as needed for Constipation.       Start Date: 4/4/2024  End Date: --    SENSIPAR 60 MG TAB    Take 60 mg by mouth daily with breakfast.       Start Date: 8/25/2024 End Date: --    SEVELAMER CARBONATE (RENVELA) 800 MG TAB    Take 2 tablets (1,600 mg total) by mouth 3 (three) times daily with meals.       Start Date: 6/12/2024 End Date: 6/11/2025    VIT B COMPLEX-C-FOLIC AC-ZINC (RENAPLEX) 800 MCG- 12.5 MG TAB    Take 1 tablet by mouth.       Start Date: 1/18/2024 End Date: --    XPHOZAH 20 MG TAB    Take 20 mg by mouth 2 (two) times a day.       Start Date: 8/26/2024 End Date: --       Order(s) placed per written order guidelines: none    Please advise.

## 2024-09-18 NOTE — TELEPHONE ENCOUNTER
No care due was identified.  [facility] Embedded Care Due Messages. Reference number: [id number].   9/18/2024 7:51:20 AM CDT   all other ROS negative except as per HPI

## 2024-10-03 RX ORDER — NITROGLYCERIN 0.4 MG/1
0.4 TABLET SUBLINGUAL EVERY 5 MIN PRN
Qty: 100 TABLET | Refills: 0 | Status: CANCELLED | OUTPATIENT
Start: 2024-10-03 | End: 2025-10-03

## 2024-10-09 RX ORDER — NITROGLYCERIN 0.4 MG/1
0.4 TABLET SUBLINGUAL EVERY 5 MIN PRN
Qty: 100 TABLET | Refills: 0 | Status: CANCELLED | OUTPATIENT
Start: 2024-10-03 | End: 2025-10-03

## 2024-10-28 ENCOUNTER — OFFICE VISIT (OUTPATIENT)
Dept: DIALYSIS | Facility: HOSPITAL | Age: 49
End: 2024-10-28
Attending: EMERGENCY MEDICINE
Payer: MEDICARE

## 2024-10-28 ENCOUNTER — HOSPITAL ENCOUNTER (OUTPATIENT)
Facility: HOSPITAL | Age: 49
Discharge: HOME | End: 2024-10-29
Attending: EMERGENCY MEDICINE | Admitting: EMERGENCY MEDICINE
Payer: MEDICARE

## 2024-10-28 DIAGNOSIS — Z71.89 ACP (ADVANCE CARE PLANNING): ICD-10-CM

## 2024-10-28 DIAGNOSIS — I25.10 CORONARY ARTERY DISEASE, UNSPECIFIED VESSEL OR LESION TYPE, UNSPECIFIED WHETHER ANGINA PRESENT, UNSPECIFIED WHETHER NATIVE OR TRANSPLANTED HEART: ICD-10-CM

## 2024-10-28 DIAGNOSIS — R06.00 DYSPNEA, UNSPECIFIED TYPE: ICD-10-CM

## 2024-10-28 DIAGNOSIS — I16.1 HYPERTENSIVE EMERGENCY: ICD-10-CM

## 2024-10-28 DIAGNOSIS — Z51.5 PALLIATIVE CARE ENCOUNTER: ICD-10-CM

## 2024-10-28 DIAGNOSIS — R07.9 CHEST PAIN: Primary | ICD-10-CM

## 2024-10-28 DIAGNOSIS — R07.89 OTHER CHEST PAIN: ICD-10-CM

## 2024-10-28 DIAGNOSIS — R52 PAIN: ICD-10-CM

## 2024-10-28 DIAGNOSIS — R79.89 ELEVATED TROPONIN: ICD-10-CM

## 2024-10-28 DIAGNOSIS — E87.70 HYPERVOLEMIA, UNSPECIFIED HYPERVOLEMIA TYPE: ICD-10-CM

## 2024-10-28 PROBLEM — I10 ESSENTIAL HYPERTENSION: Chronic | Status: ACTIVE | Noted: 2019-11-21

## 2024-10-28 LAB
ALBUMIN SERPL BCP-MCNC: 3.4 G/DL (ref 3.5–5.2)
ALP SERPL-CCNC: 59 U/L (ref 40–150)
ALT SERPL W/O P-5'-P-CCNC: 9 U/L (ref 10–44)
ANION GAP SERPL CALC-SCNC: 5 MMOL/L (ref 8–16)
ASCENDING AORTA: 3.53 CM
AST SERPL-CCNC: 14 U/L (ref 10–40)
BASOPHILS # BLD AUTO: 0.09 K/UL (ref 0–0.2)
BASOPHILS NFR BLD: 1.2 % (ref 0–1.9)
BILIRUB SERPL-MCNC: 0.5 MG/DL (ref 0.1–1)
BNP SERPL-MCNC: 817 PG/ML (ref 0–99)
BSA FOR ECHO PROCEDURE: 1.8 M2
BUN SERPL-MCNC: 93 MG/DL (ref 6–20)
CALCIUM SERPL-MCNC: 9.5 MG/DL (ref 8.7–10.5)
CHLORIDE SERPL-SCNC: 109 MMOL/L (ref 95–110)
CO2 SERPL-SCNC: 19 MMOL/L (ref 23–29)
CREAT SERPL-MCNC: 13.4 MG/DL (ref 0.5–1.4)
CV ECHO LV RWT: 0.39 CM
DIFFERENTIAL METHOD BLD: ABNORMAL
DOP CALC LVOT AREA: 3.5 CM2
DOP CALC LVOT DIAMETER: 2.1 CM
E/E' RATIO: 17.65 M/S
ECHO EF ESTIMATED: 61 %
ECHO LV POSTERIOR WALL: 1.1 CM (ref 0.6–1.1)
EOSINOPHIL # BLD AUTO: 0.4 K/UL (ref 0–0.5)
EOSINOPHIL NFR BLD: 5.5 % (ref 0–8)
ERYTHROCYTE [DISTWIDTH] IN BLOOD BY AUTOMATED COUNT: 15.8 % (ref 11.5–14.5)
EST. GFR  (NO RACE VARIABLE): 4.1 ML/MIN/1.73 M^2
FRACTIONAL SHORTENING: 33.3 % (ref 28–44)
GLUCOSE SERPL-MCNC: 98 MG/DL (ref 70–110)
HCT VFR BLD AUTO: 30.2 % (ref 40–54)
HGB BLD-MCNC: 10 G/DL (ref 14–18)
IMM GRANULOCYTES # BLD AUTO: 0.01 K/UL (ref 0–0.04)
IMM GRANULOCYTES NFR BLD AUTO: 0.1 % (ref 0–0.5)
INTERVENTRICULAR SEPTUM: 1.2 CM (ref 0.6–1.1)
LA MAJOR: 6.35 CM
LA MINOR: 6.4 CM
LA WIDTH: 5.39 CM
LEFT ATRIUM SIZE: 5.17 CM
LEFT ATRIUM VOLUME INDEX MOD: 56.5 ML/M2
LEFT ATRIUM VOLUME INDEX: 84.4 ML/M2
LEFT ATRIUM VOLUME MOD: 101.1 ML
LEFT ATRIUM VOLUME: 151 CM3
LEFT INTERNAL DIMENSION IN SYSTOLE: 3.8 CM (ref 2.1–4)
LEFT VENTRICLE DIASTOLIC VOLUME INDEX: 88.36 ML/M2
LEFT VENTRICLE DIASTOLIC VOLUME: 158.16 ML
LEFT VENTRICLE MASS INDEX: 152.2 G/M2
LEFT VENTRICLE SYSTOLIC VOLUME INDEX: 34.3 ML/M2
LEFT VENTRICLE SYSTOLIC VOLUME: 61.36 ML
LEFT VENTRICULAR INTERNAL DIMENSION IN DIASTOLE: 5.7 CM (ref 3.5–6)
LEFT VENTRICULAR MASS: 272.5 G
LV LATERAL E/E' RATIO: 11.54 M/S
LV SEPTAL E/E' RATIO: 37.5 M/S
LYMPHOCYTES # BLD AUTO: 1.5 K/UL (ref 1–4.8)
LYMPHOCYTES NFR BLD: 19.8 % (ref 18–48)
MCH RBC QN AUTO: 30.9 PG (ref 27–31)
MCHC RBC AUTO-ENTMCNC: 33.1 G/DL (ref 32–36)
MCV RBC AUTO: 93 FL (ref 82–98)
MONOCYTES # BLD AUTO: 0.8 K/UL (ref 0.3–1)
MONOCYTES NFR BLD: 10 % (ref 4–15)
MV PEAK E VEL: 1.5 M/S
NEUTROPHILS # BLD AUTO: 4.9 K/UL (ref 1.8–7.7)
NEUTROPHILS NFR BLD: 63.4 % (ref 38–73)
NRBC BLD-RTO: 0 /100 WBC
OHS CV RV/LV RATIO: 0.61 CM
OHS QRS DURATION: 102 MS
OHS QRS DURATION: 108 MS
OHS QRS DURATION: 114 MS
OHS QRS DURATION: 98 MS
OHS QTC CALCULATION: 454 MS
OHS QTC CALCULATION: 457 MS
OHS QTC CALCULATION: 458 MS
OHS QTC CALCULATION: 459 MS
PISA TR MAX VEL: 3.8 M/S
PLATELET # BLD AUTO: 233 K/UL (ref 150–450)
PMV BLD AUTO: 10.6 FL (ref 9.2–12.9)
POCT GLUCOSE: 94 MG/DL (ref 70–110)
POTASSIUM SERPL-SCNC: 4.4 MMOL/L (ref 3.5–5.1)
PROT SERPL-MCNC: 7.5 G/DL (ref 6–8.4)
RA MAJOR: 4.96 CM
RA PRESSURE ESTIMATED: 3 MMHG
RA WIDTH: 3.57 CM
RBC # BLD AUTO: 3.24 M/UL (ref 4.6–6.2)
RIGHT VENTRICLE DIASTOLIC BASEL DIMENSION: 3.5 CM
RV TB RVSP: 7 MMHG
RV TISSUE DOPPLER FREE WALL SYSTOLIC VELOCITY 1 (APICAL 4 CHAMBER VIEW): 18.64 CM/S
SINUS: 3.27 CM
SODIUM SERPL-SCNC: 133 MMOL/L (ref 136–145)
STJ: 2.92 CM
TDI LATERAL: 0.13 M/S
TDI SEPTAL: 0.04 M/S
TDI: 0.09 M/S
TR MAX PG: 58 MMHG
TRICUSPID ANNULAR PLANE SYSTOLIC EXCURSION: 1.92 CM
TROPONIN I SERPL DL<=0.01 NG/ML-MCNC: 0.11 NG/ML (ref 0–0.03)
TROPONIN I SERPL DL<=0.01 NG/ML-MCNC: 0.12 NG/ML (ref 0–0.03)
TROPONIN I SERPL DL<=0.01 NG/ML-MCNC: 0.12 NG/ML (ref 0–0.03)
TV PEAK GRADIENT: 72 MMHG
TV REST PULMONARY ARTERY PRESSURE: 61 MMHG
WBC # BLD AUTO: 7.68 K/UL (ref 3.9–12.7)
Z-SCORE OF LEFT VENTRICULAR DIMENSION IN END DIASTOLE: 1.41
Z-SCORE OF LEFT VENTRICULAR DIMENSION IN END SYSTOLE: 1.72

## 2024-10-28 PROCEDURE — 25000003 PHARM REV CODE 250

## 2024-10-28 PROCEDURE — 25000003 PHARM REV CODE 250: Performed by: STUDENT IN AN ORGANIZED HEALTH CARE EDUCATION/TRAINING PROGRAM

## 2024-10-28 PROCEDURE — G0257 UNSCHED DIALYSIS ESRD PT HOS: HCPCS

## 2024-10-28 PROCEDURE — 85025 COMPLETE CBC W/AUTO DIFF WBC: CPT

## 2024-10-28 PROCEDURE — 25000242 PHARM REV CODE 250 ALT 637 W/ HCPCS

## 2024-10-28 PROCEDURE — 84484 ASSAY OF TROPONIN QUANT: CPT | Mod: 91

## 2024-10-28 PROCEDURE — 84484 ASSAY OF TROPONIN QUANT: CPT | Mod: 91 | Performed by: STUDENT IN AN ORGANIZED HEALTH CARE EDUCATION/TRAINING PROGRAM

## 2024-10-28 PROCEDURE — G0378 HOSPITAL OBSERVATION PER HR: HCPCS

## 2024-10-28 PROCEDURE — 80053 COMPREHEN METABOLIC PANEL: CPT

## 2024-10-28 PROCEDURE — 93005 ELECTROCARDIOGRAM TRACING: CPT

## 2024-10-28 PROCEDURE — 63600175 PHARM REV CODE 636 W HCPCS: Performed by: HOSPITALIST

## 2024-10-28 PROCEDURE — 25000003 PHARM REV CODE 250: Performed by: HOSPITALIST

## 2024-10-28 PROCEDURE — 93010 ELECTROCARDIOGRAM REPORT: CPT | Mod: ,,, | Performed by: INTERNAL MEDICINE

## 2024-10-28 PROCEDURE — 25000242 PHARM REV CODE 250 ALT 637 W/ HCPCS: Performed by: HOSPITALIST

## 2024-10-28 PROCEDURE — 83880 ASSAY OF NATRIURETIC PEPTIDE: CPT

## 2024-10-28 PROCEDURE — 96372 THER/PROPH/DIAG INJ SC/IM: CPT | Performed by: HOSPITALIST

## 2024-10-28 PROCEDURE — 63600175 PHARM REV CODE 636 W HCPCS: Performed by: NURSE PRACTITIONER

## 2024-10-28 RX ORDER — CLONIDINE HYDROCHLORIDE 0.1 MG/1
0.3 TABLET ORAL EVERY 12 HOURS PRN
Status: DISCONTINUED | OUTPATIENT
Start: 2024-10-28 | End: 2024-10-29 | Stop reason: HOSPADM

## 2024-10-28 RX ORDER — POLYETHYLENE GLYCOL 3350 17 G/17G
17 POWDER, FOR SOLUTION ORAL DAILY
Status: DISCONTINUED | OUTPATIENT
Start: 2024-10-28 | End: 2024-10-29 | Stop reason: HOSPADM

## 2024-10-28 RX ORDER — NITROGLYCERIN 0.4 MG/1
0.4 TABLET SUBLINGUAL EVERY 5 MIN PRN
Status: DISCONTINUED | OUTPATIENT
Start: 2024-10-28 | End: 2024-10-29 | Stop reason: HOSPADM

## 2024-10-28 RX ORDER — HEPARIN SODIUM 1000 [USP'U]/ML
2000 INJECTION, SOLUTION INTRAVENOUS; SUBCUTANEOUS
Status: ACTIVE | OUTPATIENT
Start: 2024-10-28 | End: 2024-10-29

## 2024-10-28 RX ORDER — ISOSORBIDE MONONITRATE 60 MG/1
120 TABLET, EXTENDED RELEASE ORAL DAILY
Status: DISCONTINUED | OUTPATIENT
Start: 2024-10-28 | End: 2024-10-29 | Stop reason: HOSPADM

## 2024-10-28 RX ORDER — ASPIRIN 325 MG
325 TABLET ORAL
Status: COMPLETED | OUTPATIENT
Start: 2024-10-28 | End: 2024-10-28

## 2024-10-28 RX ORDER — ATORVASTATIN CALCIUM 40 MG/1
80 TABLET, FILM COATED ORAL DAILY
Status: DISCONTINUED | OUTPATIENT
Start: 2024-10-28 | End: 2024-10-29 | Stop reason: HOSPADM

## 2024-10-28 RX ORDER — ONDANSETRON HYDROCHLORIDE 2 MG/ML
4 INJECTION, SOLUTION INTRAVENOUS EVERY 8 HOURS PRN
Status: DISCONTINUED | OUTPATIENT
Start: 2024-10-28 | End: 2024-10-29 | Stop reason: HOSPADM

## 2024-10-28 RX ORDER — MINOXIDIL 2.5 MG/1
5 TABLET ORAL 2 TIMES DAILY
Status: DISCONTINUED | OUTPATIENT
Start: 2024-10-28 | End: 2024-10-28

## 2024-10-28 RX ORDER — HEPARIN SODIUM 1000 [USP'U]/ML
2000 INJECTION, SOLUTION INTRAVENOUS; SUBCUTANEOUS
Status: DISCONTINUED | OUTPATIENT
Start: 2024-10-28 | End: 2024-10-28

## 2024-10-28 RX ORDER — NALOXONE HCL 0.4 MG/ML
0.02 VIAL (ML) INJECTION
Status: DISCONTINUED | OUTPATIENT
Start: 2024-10-28 | End: 2024-10-29 | Stop reason: HOSPADM

## 2024-10-28 RX ORDER — CLOPIDOGREL BISULFATE 75 MG/1
75 TABLET ORAL DAILY
Status: DISCONTINUED | OUTPATIENT
Start: 2024-10-28 | End: 2024-10-29 | Stop reason: HOSPADM

## 2024-10-28 RX ORDER — GLUCAGON 1 MG
1 KIT INJECTION
Status: DISCONTINUED | OUTPATIENT
Start: 2024-10-28 | End: 2024-10-29 | Stop reason: HOSPADM

## 2024-10-28 RX ORDER — SEVELAMER CARBONATE 800 MG/1
1600 TABLET, FILM COATED ORAL
Status: DISCONTINUED | OUTPATIENT
Start: 2024-10-28 | End: 2024-10-29 | Stop reason: HOSPADM

## 2024-10-28 RX ORDER — HEPARIN SODIUM 5000 [USP'U]/ML
5000 INJECTION, SOLUTION INTRAVENOUS; SUBCUTANEOUS EVERY 8 HOURS
Status: DISCONTINUED | OUTPATIENT
Start: 2024-10-28 | End: 2024-10-29 | Stop reason: HOSPADM

## 2024-10-28 RX ORDER — NITROGLYCERIN 0.4 MG/1
0.4 TABLET SUBLINGUAL
Status: COMPLETED | OUTPATIENT
Start: 2024-10-28 | End: 2024-10-28

## 2024-10-28 RX ORDER — IBUPROFEN 200 MG
16 TABLET ORAL
Status: DISCONTINUED | OUTPATIENT
Start: 2024-10-28 | End: 2024-10-28

## 2024-10-28 RX ORDER — ASPIRIN 81 MG/1
81 TABLET ORAL DAILY
Status: DISCONTINUED | OUTPATIENT
Start: 2024-10-29 | End: 2024-10-29 | Stop reason: HOSPADM

## 2024-10-28 RX ORDER — HYDRALAZINE HYDROCHLORIDE 50 MG/1
100 TABLET, FILM COATED ORAL EVERY 8 HOURS
Status: DISCONTINUED | OUTPATIENT
Start: 2024-10-28 | End: 2024-10-29 | Stop reason: HOSPADM

## 2024-10-28 RX ORDER — TALC
6 POWDER (GRAM) TOPICAL NIGHTLY PRN
Status: DISCONTINUED | OUTPATIENT
Start: 2024-10-28 | End: 2024-10-29 | Stop reason: HOSPADM

## 2024-10-28 RX ORDER — CINACALCET 30 MG/1
60 TABLET, FILM COATED ORAL
Status: DISCONTINUED | OUTPATIENT
Start: 2024-10-28 | End: 2024-10-29 | Stop reason: HOSPADM

## 2024-10-28 RX ORDER — EZETIMIBE 10 MG/1
10 TABLET ORAL DAILY
Status: DISCONTINUED | OUTPATIENT
Start: 2024-10-28 | End: 2024-10-29 | Stop reason: HOSPADM

## 2024-10-28 RX ORDER — ACETAMINOPHEN 325 MG/1
650 TABLET ORAL EVERY 4 HOURS PRN
Status: DISCONTINUED | OUTPATIENT
Start: 2024-10-28 | End: 2024-10-29 | Stop reason: HOSPADM

## 2024-10-28 RX ORDER — MINOXIDIL 2.5 MG/1
10 TABLET ORAL 2 TIMES DAILY
Status: DISCONTINUED | OUTPATIENT
Start: 2024-10-28 | End: 2024-10-29 | Stop reason: HOSPADM

## 2024-10-28 RX ORDER — IBUPROFEN 200 MG
24 TABLET ORAL
Status: DISCONTINUED | OUTPATIENT
Start: 2024-10-28 | End: 2024-10-28

## 2024-10-28 RX ORDER — CARVEDILOL 25 MG/1
50 TABLET ORAL 2 TIMES DAILY WITH MEALS
Status: DISCONTINUED | OUTPATIENT
Start: 2024-10-28 | End: 2024-10-29 | Stop reason: HOSPADM

## 2024-10-28 RX ORDER — B-COMPLEX WITH VITAMIN C
1 TABLET ORAL DAILY
Status: DISCONTINUED | OUTPATIENT
Start: 2024-10-28 | End: 2024-10-29 | Stop reason: HOSPADM

## 2024-10-28 RX ORDER — IBUPROFEN 200 MG
16 TABLET ORAL
Status: DISCONTINUED | OUTPATIENT
Start: 2024-10-28 | End: 2024-10-29 | Stop reason: HOSPADM

## 2024-10-28 RX ORDER — GLUCAGON 1 MG
1 KIT INJECTION
Status: DISCONTINUED | OUTPATIENT
Start: 2024-10-28 | End: 2024-10-28

## 2024-10-28 RX ORDER — SODIUM CHLORIDE 0.9 % (FLUSH) 0.9 %
10 SYRINGE (ML) INJECTION EVERY 12 HOURS PRN
Status: DISCONTINUED | OUTPATIENT
Start: 2024-10-28 | End: 2024-10-29 | Stop reason: HOSPADM

## 2024-10-28 RX ORDER — IBUPROFEN 200 MG
24 TABLET ORAL
Status: DISCONTINUED | OUTPATIENT
Start: 2024-10-28 | End: 2024-10-29 | Stop reason: HOSPADM

## 2024-10-28 RX ADMIN — MINOXIDIL 5 MG: 2.5 TABLET ORAL at 09:10

## 2024-10-28 RX ADMIN — SEVELAMER CARBONATE 1600 MG: 800 TABLET, FILM COATED ORAL at 05:10

## 2024-10-28 RX ADMIN — NITROGLYCERIN 0.4 MG: 0.4 TABLET, ORALLY DISINTEGRATING SUBLINGUAL at 03:10

## 2024-10-28 RX ADMIN — HEPARIN SODIUM 5000 UNITS: 5000 INJECTION INTRAVENOUS; SUBCUTANEOUS at 09:10

## 2024-10-28 RX ADMIN — ATORVASTATIN CALCIUM 80 MG: 40 TABLET, FILM COATED ORAL at 09:10

## 2024-10-28 RX ADMIN — NITROGLYCERIN 2 INCH: 20 OINTMENT TOPICAL at 04:10

## 2024-10-28 RX ADMIN — EZETIMIBE 10 MG: 10 TABLET ORAL at 09:10

## 2024-10-28 RX ADMIN — NITROGLYCERIN 0.4 MG: 0.4 TABLET, ORALLY DISINTEGRATING SUBLINGUAL at 07:10

## 2024-10-28 RX ADMIN — ISOSORBIDE MONONITRATE 120 MG: 60 TABLET, EXTENDED RELEASE ORAL at 09:10

## 2024-10-28 RX ADMIN — HEPARIN SODIUM 2000 UNITS: 1000 INJECTION, SOLUTION INTRAVENOUS; SUBCUTANEOUS at 11:10

## 2024-10-28 RX ADMIN — MINOXIDIL 10 MG: 2.5 TABLET ORAL at 09:10

## 2024-10-28 RX ADMIN — CLOPIDOGREL BISULFATE 75 MG: 75 TABLET ORAL at 09:10

## 2024-10-28 RX ADMIN — SEVELAMER CARBONATE 1600 MG: 800 TABLET, FILM COATED ORAL at 09:10

## 2024-10-28 RX ADMIN — NIFEDIPINE 90 MG: 60 TABLET, FILM COATED, EXTENDED RELEASE ORAL at 09:10

## 2024-10-28 RX ADMIN — Medication 1 TABLET: at 09:10

## 2024-10-28 RX ADMIN — CARVEDILOL 50 MG: 25 TABLET, FILM COATED ORAL at 05:10

## 2024-10-28 RX ADMIN — CINACALCET HYDROCHLORIDE 60 MG: 30 TABLET, FILM COATED ORAL at 09:10

## 2024-10-28 RX ADMIN — ASPIRIN 325 MG ORAL TABLET 325 MG: 325 PILL ORAL at 04:10

## 2024-10-28 RX ADMIN — HYDRALAZINE HYDROCHLORIDE 100 MG: 50 TABLET ORAL at 09:10

## 2024-10-28 RX ADMIN — NITROGLYCERIN 0.4 MG: 0.4 TABLET, ORALLY DISINTEGRATING SUBLINGUAL at 09:10

## 2024-10-28 RX ADMIN — SEVELAMER CARBONATE 1600 MG: 800 TABLET, FILM COATED ORAL at 01:10

## 2024-10-28 RX ADMIN — CARVEDILOL 50 MG: 25 TABLET, FILM COATED ORAL at 09:10

## 2024-10-28 RX ADMIN — POLYETHYLENE GLYCOL 3350 17 G: 17 POWDER, FOR SOLUTION ORAL at 09:10

## 2024-10-28 RX ADMIN — HYDRALAZINE HYDROCHLORIDE 100 MG: 50 TABLET ORAL at 01:10

## 2024-10-29 VITALS
BODY MASS INDEX: 23.7 KG/M2 | OXYGEN SATURATION: 100 % | RESPIRATION RATE: 18 BRPM | WEIGHT: 151 LBS | DIASTOLIC BLOOD PRESSURE: 69 MMHG | SYSTOLIC BLOOD PRESSURE: 138 MMHG | HEART RATE: 62 BPM | HEIGHT: 67 IN | TEMPERATURE: 98 F

## 2024-10-29 PROBLEM — Z71.89 ACP (ADVANCE CARE PLANNING): Status: ACTIVE | Noted: 2024-10-29

## 2024-10-29 PROBLEM — R06.00 DYSPNEA: Status: ACTIVE | Noted: 2024-10-29

## 2024-10-29 PROBLEM — R52 PAIN: Status: ACTIVE | Noted: 2024-10-29

## 2024-10-29 PROBLEM — I25.10 CORONARY ARTERY DISEASE: Status: ACTIVE | Noted: 2024-10-29

## 2024-10-29 PROBLEM — Z51.5 PALLIATIVE CARE ENCOUNTER: Status: ACTIVE | Noted: 2024-10-29

## 2024-10-29 PROBLEM — E87.70 HYPERVOLEMIA: Status: ACTIVE | Noted: 2024-10-29

## 2024-10-29 LAB
ALBUMIN SERPL BCP-MCNC: 3.1 G/DL (ref 3.5–5.2)
ALP SERPL-CCNC: 60 U/L (ref 40–150)
ALT SERPL W/O P-5'-P-CCNC: 10 U/L (ref 10–44)
ANION GAP SERPL CALC-SCNC: 14 MMOL/L (ref 8–16)
AST SERPL-CCNC: 13 U/L (ref 10–40)
BASOPHILS # BLD AUTO: 0.08 K/UL (ref 0–0.2)
BASOPHILS NFR BLD: 1.2 % (ref 0–1.9)
BILIRUB SERPL-MCNC: 0.6 MG/DL (ref 0.1–1)
BUN SERPL-MCNC: 55 MG/DL (ref 6–20)
CALCIUM SERPL-MCNC: 8.6 MG/DL (ref 8.7–10.5)
CHLORIDE SERPL-SCNC: 98 MMOL/L (ref 95–110)
CO2 SERPL-SCNC: 23 MMOL/L (ref 23–29)
CREAT SERPL-MCNC: 9.2 MG/DL (ref 0.5–1.4)
DIFFERENTIAL METHOD BLD: ABNORMAL
EOSINOPHIL # BLD AUTO: 0.4 K/UL (ref 0–0.5)
EOSINOPHIL NFR BLD: 6.3 % (ref 0–8)
ERYTHROCYTE [DISTWIDTH] IN BLOOD BY AUTOMATED COUNT: 15.4 % (ref 11.5–14.5)
EST. GFR  (NO RACE VARIABLE): 6.5 ML/MIN/1.73 M^2
GLUCOSE SERPL-MCNC: 88 MG/DL (ref 70–110)
HCT VFR BLD AUTO: 31.2 % (ref 40–54)
HGB BLD-MCNC: 9.9 G/DL (ref 14–18)
IMM GRANULOCYTES # BLD AUTO: 0.03 K/UL (ref 0–0.04)
IMM GRANULOCYTES NFR BLD AUTO: 0.4 % (ref 0–0.5)
LYMPHOCYTES # BLD AUTO: 1.4 K/UL (ref 1–4.8)
LYMPHOCYTES NFR BLD: 20.1 % (ref 18–48)
MAGNESIUM SERPL-MCNC: 1.9 MG/DL (ref 1.6–2.6)
MCH RBC QN AUTO: 30.2 PG (ref 27–31)
MCHC RBC AUTO-ENTMCNC: 31.7 G/DL (ref 32–36)
MCV RBC AUTO: 95 FL (ref 82–98)
MONOCYTES # BLD AUTO: 0.8 K/UL (ref 0.3–1)
MONOCYTES NFR BLD: 10.8 % (ref 4–15)
NEUTROPHILS # BLD AUTO: 4.3 K/UL (ref 1.8–7.7)
NEUTROPHILS NFR BLD: 61.2 % (ref 38–73)
NRBC BLD-RTO: 0 /100 WBC
PHOSPHATE SERPL-MCNC: 4.9 MG/DL (ref 2.7–4.5)
PLATELET # BLD AUTO: 234 K/UL (ref 150–450)
PMV BLD AUTO: 10.8 FL (ref 9.2–12.9)
POTASSIUM SERPL-SCNC: 4.4 MMOL/L (ref 3.5–5.1)
PROT SERPL-MCNC: 7.1 G/DL (ref 6–8.4)
RBC # BLD AUTO: 3.28 M/UL (ref 4.6–6.2)
SODIUM SERPL-SCNC: 135 MMOL/L (ref 136–145)
WBC # BLD AUTO: 6.95 K/UL (ref 3.9–12.7)

## 2024-10-29 PROCEDURE — 25000003 PHARM REV CODE 250: Performed by: STUDENT IN AN ORGANIZED HEALTH CARE EDUCATION/TRAINING PROGRAM

## 2024-10-29 PROCEDURE — 25000242 PHARM REV CODE 250 ALT 637 W/ HCPCS: Performed by: HOSPITALIST

## 2024-10-29 PROCEDURE — 96372 THER/PROPH/DIAG INJ SC/IM: CPT | Performed by: HOSPITALIST

## 2024-10-29 PROCEDURE — 83735 ASSAY OF MAGNESIUM: CPT | Performed by: HOSPITALIST

## 2024-10-29 PROCEDURE — 99497 ADVNCD CARE PLAN 30 MIN: CPT | Mod: 25,,,

## 2024-10-29 PROCEDURE — 36415 COLL VENOUS BLD VENIPUNCTURE: CPT | Performed by: HOSPITALIST

## 2024-10-29 PROCEDURE — 80053 COMPREHEN METABOLIC PANEL: CPT | Performed by: HOSPITALIST

## 2024-10-29 PROCEDURE — 84100 ASSAY OF PHOSPHORUS: CPT | Performed by: HOSPITALIST

## 2024-10-29 PROCEDURE — G0378 HOSPITAL OBSERVATION PER HR: HCPCS

## 2024-10-29 PROCEDURE — 25000003 PHARM REV CODE 250: Performed by: HOSPITALIST

## 2024-10-29 PROCEDURE — 85025 COMPLETE CBC W/AUTO DIFF WBC: CPT | Performed by: HOSPITALIST

## 2024-10-29 PROCEDURE — 63600175 PHARM REV CODE 636 W HCPCS: Performed by: HOSPITALIST

## 2024-10-29 PROCEDURE — 99215 OFFICE O/P EST HI 40 MIN: CPT | Mod: ,,,

## 2024-10-29 RX ORDER — MINOXIDIL 10 MG/1
10 TABLET ORAL 2 TIMES DAILY
Qty: 60 TABLET | Refills: 1 | Status: SHIPPED | OUTPATIENT
Start: 2024-10-29

## 2024-10-29 RX ORDER — RANOLAZINE 500 MG/1
500 TABLET, EXTENDED RELEASE ORAL DAILY
Status: DISCONTINUED | OUTPATIENT
Start: 2024-10-29 | End: 2024-10-29 | Stop reason: HOSPADM

## 2024-10-29 RX ORDER — ATORVASTATIN CALCIUM 80 MG/1
80 TABLET, FILM COATED ORAL DAILY
Qty: 90 TABLET | Refills: 1 | Status: SHIPPED | OUTPATIENT
Start: 2024-10-29

## 2024-10-29 RX ORDER — RANOLAZINE 500 MG/1
500 TABLET, EXTENDED RELEASE ORAL 2 TIMES DAILY
Status: DISCONTINUED | OUTPATIENT
Start: 2024-10-29 | End: 2024-10-29

## 2024-10-29 RX ORDER — RANOLAZINE 500 MG/1
500 TABLET, EXTENDED RELEASE ORAL DAILY
Qty: 30 TABLET | Refills: 1 | Status: SHIPPED | OUTPATIENT
Start: 2024-10-29

## 2024-10-29 RX ORDER — NITROGLYCERIN 0.4 MG/1
0.4 TABLET SUBLINGUAL EVERY 5 MIN PRN
Qty: 25 TABLET | Refills: 1 | Status: SHIPPED | OUTPATIENT
Start: 2024-10-29

## 2024-10-29 RX ORDER — ISOSORBIDE MONONITRATE 120 MG/1
120 TABLET, EXTENDED RELEASE ORAL DAILY
Qty: 90 TABLET | Refills: 1 | Status: SHIPPED | OUTPATIENT
Start: 2024-10-29

## 2024-10-29 RX ADMIN — HYDRALAZINE HYDROCHLORIDE 100 MG: 50 TABLET ORAL at 01:10

## 2024-10-29 RX ADMIN — NITROGLYCERIN 0.4 MG: 0.4 TABLET, ORALLY DISINTEGRATING SUBLINGUAL at 07:10

## 2024-10-29 RX ADMIN — CARVEDILOL 50 MG: 25 TABLET, FILM COATED ORAL at 08:10

## 2024-10-29 RX ADMIN — SEVELAMER CARBONATE 1600 MG: 800 TABLET, FILM COATED ORAL at 08:10

## 2024-10-29 RX ADMIN — NIFEDIPINE 90 MG: 60 TABLET, FILM COATED, EXTENDED RELEASE ORAL at 08:10

## 2024-10-29 RX ADMIN — HYDRALAZINE HYDROCHLORIDE 100 MG: 50 TABLET ORAL at 05:10

## 2024-10-29 RX ADMIN — Medication 1 TABLET: at 09:10

## 2024-10-29 RX ADMIN — ASPIRIN 81 MG: 81 TABLET, COATED ORAL at 08:10

## 2024-10-29 RX ADMIN — CINACALCET HYDROCHLORIDE 60 MG: 30 TABLET, FILM COATED ORAL at 07:10

## 2024-10-29 RX ADMIN — MINOXIDIL 10 MG: 2.5 TABLET ORAL at 09:10

## 2024-10-29 RX ADMIN — ISOSORBIDE MONONITRATE 120 MG: 60 TABLET, EXTENDED RELEASE ORAL at 08:10

## 2024-10-29 RX ADMIN — SEVELAMER CARBONATE 1600 MG: 800 TABLET, FILM COATED ORAL at 01:10

## 2024-10-29 RX ADMIN — ATORVASTATIN CALCIUM 80 MG: 40 TABLET, FILM COATED ORAL at 08:10

## 2024-10-29 RX ADMIN — EZETIMIBE 10 MG: 10 TABLET ORAL at 08:10

## 2024-10-29 RX ADMIN — HEPARIN SODIUM 5000 UNITS: 5000 INJECTION INTRAVENOUS; SUBCUTANEOUS at 05:10

## 2024-10-29 RX ADMIN — CLOPIDOGREL BISULFATE 75 MG: 75 TABLET ORAL at 08:10

## 2024-10-29 RX ADMIN — ACETAMINOPHEN 650 MG: 325 TABLET ORAL at 05:10

## 2024-10-30 ENCOUNTER — HOSPITAL ENCOUNTER (INPATIENT)
Facility: HOSPITAL | Age: 49
LOS: 2 days | Discharge: HOME HEALTH | DRG: 280 | End: 2024-11-02
Attending: EMERGENCY MEDICINE | Admitting: STUDENT IN AN ORGANIZED HEALTH CARE EDUCATION/TRAINING PROGRAM
Payer: MEDICARE

## 2024-10-30 DIAGNOSIS — I25.10 CAD (CORONARY ARTERY DISEASE): ICD-10-CM

## 2024-10-30 DIAGNOSIS — E78.2 MIXED HYPERLIPIDEMIA: ICD-10-CM

## 2024-10-30 DIAGNOSIS — I21.4 NSTEMI (NON-ST ELEVATED MYOCARDIAL INFARCTION): Primary | ICD-10-CM

## 2024-10-30 DIAGNOSIS — R07.9 CHEST PAIN: ICD-10-CM

## 2024-10-30 LAB
ALBUMIN SERPL BCP-MCNC: 3.1 G/DL (ref 3.5–5.2)
ALP SERPL-CCNC: 59 U/L (ref 40–150)
ALT SERPL W/O P-5'-P-CCNC: 9 U/L (ref 10–44)
ANION GAP SERPL CALC-SCNC: 10 MMOL/L (ref 8–16)
APTT PPP: 35.7 SEC (ref 21–32)
AST SERPL-CCNC: 15 U/L (ref 10–40)
BASOPHILS # BLD AUTO: 0.06 K/UL (ref 0–0.2)
BASOPHILS NFR BLD: 1.1 % (ref 0–1.9)
BILIRUB SERPL-MCNC: 0.6 MG/DL (ref 0.1–1)
BNP SERPL-MCNC: 400 PG/ML (ref 0–99)
BUN SERPL-MCNC: 38 MG/DL (ref 6–20)
CALCIUM SERPL-MCNC: 8.6 MG/DL (ref 8.7–10.5)
CHLORIDE SERPL-SCNC: 101 MMOL/L (ref 95–110)
CO2 SERPL-SCNC: 22 MMOL/L (ref 23–29)
CREAT SERPL-MCNC: 7.8 MG/DL (ref 0.5–1.4)
DIFFERENTIAL METHOD BLD: ABNORMAL
EOSINOPHIL # BLD AUTO: 0.3 K/UL (ref 0–0.5)
EOSINOPHIL NFR BLD: 5.8 % (ref 0–8)
ERYTHROCYTE [DISTWIDTH] IN BLOOD BY AUTOMATED COUNT: 15.4 % (ref 11.5–14.5)
EST. GFR  (NO RACE VARIABLE): 7.9 ML/MIN/1.73 M^2
GLUCOSE SERPL-MCNC: 217 MG/DL (ref 70–110)
HCT VFR BLD AUTO: 31.2 % (ref 40–54)
HGB BLD-MCNC: 10.4 G/DL (ref 14–18)
IMM GRANULOCYTES # BLD AUTO: 0.02 K/UL (ref 0–0.04)
IMM GRANULOCYTES NFR BLD AUTO: 0.4 % (ref 0–0.5)
INR PPP: 1.1 (ref 0.8–1.2)
LYMPHOCYTES # BLD AUTO: 1.3 K/UL (ref 1–4.8)
LYMPHOCYTES NFR BLD: 22.6 % (ref 18–48)
MAGNESIUM SERPL-MCNC: 1.9 MG/DL (ref 1.6–2.6)
MCH RBC QN AUTO: 31.2 PG (ref 27–31)
MCHC RBC AUTO-ENTMCNC: 33.3 G/DL (ref 32–36)
MCV RBC AUTO: 94 FL (ref 82–98)
MONOCYTES # BLD AUTO: 0.6 K/UL (ref 0.3–1)
MONOCYTES NFR BLD: 9.9 % (ref 4–15)
NEUTROPHILS # BLD AUTO: 3.3 K/UL (ref 1.8–7.7)
NEUTROPHILS NFR BLD: 60.2 % (ref 38–73)
NRBC BLD-RTO: 0 /100 WBC
OHS QRS DURATION: 112 MS
OHS QTC CALCULATION: 477 MS
PLATELET # BLD AUTO: 218 K/UL (ref 150–450)
PMV BLD AUTO: 10.1 FL (ref 9.2–12.9)
POTASSIUM SERPL-SCNC: 3.7 MMOL/L (ref 3.5–5.1)
PROT SERPL-MCNC: 7 G/DL (ref 6–8.4)
PROTHROMBIN TIME: 11.5 SEC (ref 9–12.5)
RBC # BLD AUTO: 3.33 M/UL (ref 4.6–6.2)
SODIUM SERPL-SCNC: 133 MMOL/L (ref 136–145)
TROPONIN I SERPL DL<=0.01 NG/ML-MCNC: 0.75 NG/ML (ref 0–0.03)
TROPONIN I SERPL DL<=0.01 NG/ML-MCNC: 1 NG/ML (ref 0–0.03)
TROPONIN I SERPL DL<=0.01 NG/ML-MCNC: 1.46 NG/ML (ref 0–0.03)
WBC # BLD AUTO: 5.53 K/UL (ref 3.9–12.7)

## 2024-10-30 PROCEDURE — 84484 ASSAY OF TROPONIN QUANT: CPT | Mod: 91 | Performed by: NURSE PRACTITIONER

## 2024-10-30 PROCEDURE — 85730 THROMBOPLASTIN TIME PARTIAL: CPT | Performed by: NURSE PRACTITIONER

## 2024-10-30 PROCEDURE — 93005 ELECTROCARDIOGRAM TRACING: CPT

## 2024-10-30 PROCEDURE — 96372 THER/PROPH/DIAG INJ SC/IM: CPT | Performed by: STUDENT IN AN ORGANIZED HEALTH CARE EDUCATION/TRAINING PROGRAM

## 2024-10-30 PROCEDURE — 63600175 PHARM REV CODE 636 W HCPCS: Performed by: NURSE PRACTITIONER

## 2024-10-30 PROCEDURE — G0378 HOSPITAL OBSERVATION PER HR: HCPCS

## 2024-10-30 PROCEDURE — 83880 ASSAY OF NATRIURETIC PEPTIDE: CPT | Performed by: PHYSICIAN ASSISTANT

## 2024-10-30 PROCEDURE — 85610 PROTHROMBIN TIME: CPT | Performed by: NURSE PRACTITIONER

## 2024-10-30 PROCEDURE — 25000003 PHARM REV CODE 250: Performed by: PHYSICIAN ASSISTANT

## 2024-10-30 PROCEDURE — 80053 COMPREHEN METABOLIC PANEL: CPT | Performed by: PHYSICIAN ASSISTANT

## 2024-10-30 PROCEDURE — 85025 COMPLETE CBC W/AUTO DIFF WBC: CPT | Performed by: PHYSICIAN ASSISTANT

## 2024-10-30 PROCEDURE — 25000242 PHARM REV CODE 250 ALT 637 W/ HCPCS: Performed by: STUDENT IN AN ORGANIZED HEALTH CARE EDUCATION/TRAINING PROGRAM

## 2024-10-30 PROCEDURE — 84484 ASSAY OF TROPONIN QUANT: CPT | Performed by: PHYSICIAN ASSISTANT

## 2024-10-30 PROCEDURE — 83735 ASSAY OF MAGNESIUM: CPT | Performed by: PHYSICIAN ASSISTANT

## 2024-10-30 PROCEDURE — 94761 N-INVAS EAR/PLS OXIMETRY MLT: CPT

## 2024-10-30 PROCEDURE — 25000003 PHARM REV CODE 250: Performed by: STUDENT IN AN ORGANIZED HEALTH CARE EDUCATION/TRAINING PROGRAM

## 2024-10-30 PROCEDURE — 85025 COMPLETE CBC W/AUTO DIFF WBC: CPT | Mod: 91 | Performed by: NURSE PRACTITIONER

## 2024-10-30 PROCEDURE — 99285 EMERGENCY DEPT VISIT HI MDM: CPT | Mod: 25

## 2024-10-30 PROCEDURE — 96365 THER/PROPH/DIAG IV INF INIT: CPT

## 2024-10-30 PROCEDURE — 36415 COLL VENOUS BLD VENIPUNCTURE: CPT | Performed by: NURSE PRACTITIONER

## 2024-10-30 PROCEDURE — 63600175 PHARM REV CODE 636 W HCPCS: Performed by: STUDENT IN AN ORGANIZED HEALTH CARE EDUCATION/TRAINING PROGRAM

## 2024-10-30 RX ORDER — EZETIMIBE 10 MG/1
10 TABLET ORAL DAILY
Status: DISCONTINUED | OUTPATIENT
Start: 2024-10-31 | End: 2024-11-02 | Stop reason: HOSPADM

## 2024-10-30 RX ORDER — HEPARIN SODIUM,PORCINE/D5W 25000/250
0-40 INTRAVENOUS SOLUTION INTRAVENOUS CONTINUOUS
Status: DISCONTINUED | OUTPATIENT
Start: 2024-10-30 | End: 2024-11-02

## 2024-10-30 RX ORDER — POLYETHYLENE GLYCOL 3350 17 G/17G
17 POWDER, FOR SOLUTION ORAL DAILY
Status: DISCONTINUED | OUTPATIENT
Start: 2024-10-31 | End: 2024-11-02 | Stop reason: HOSPADM

## 2024-10-30 RX ORDER — ASPIRIN 81 MG/1
81 TABLET ORAL DAILY
Status: DISCONTINUED | OUTPATIENT
Start: 2024-10-31 | End: 2024-11-02 | Stop reason: HOSPADM

## 2024-10-30 RX ORDER — SEVELAMER CARBONATE 800 MG/1
1600 TABLET, FILM COATED ORAL
Status: DISCONTINUED | OUTPATIENT
Start: 2024-10-30 | End: 2024-11-02 | Stop reason: HOSPADM

## 2024-10-30 RX ORDER — ONDANSETRON HYDROCHLORIDE 2 MG/ML
4 INJECTION, SOLUTION INTRAVENOUS EVERY 8 HOURS PRN
Status: DISCONTINUED | OUTPATIENT
Start: 2024-10-30 | End: 2024-11-02 | Stop reason: HOSPADM

## 2024-10-30 RX ORDER — NITROGLYCERIN 0.4 MG/1
0.4 TABLET SUBLINGUAL EVERY 5 MIN PRN
Status: DISCONTINUED | OUTPATIENT
Start: 2024-10-30 | End: 2024-11-02 | Stop reason: HOSPADM

## 2024-10-30 RX ORDER — TALC
6 POWDER (GRAM) TOPICAL NIGHTLY PRN
Status: DISCONTINUED | OUTPATIENT
Start: 2024-10-30 | End: 2024-11-02 | Stop reason: HOSPADM

## 2024-10-30 RX ORDER — RANOLAZINE 500 MG/1
500 TABLET, EXTENDED RELEASE ORAL 2 TIMES DAILY
Status: DISCONTINUED | OUTPATIENT
Start: 2024-10-30 | End: 2024-10-30

## 2024-10-30 RX ORDER — MINOXIDIL 2.5 MG/1
10 TABLET ORAL 2 TIMES DAILY
Status: DISCONTINUED | OUTPATIENT
Start: 2024-10-30 | End: 2024-11-02 | Stop reason: HOSPADM

## 2024-10-30 RX ORDER — HYDRALAZINE HYDROCHLORIDE 50 MG/1
100 TABLET, FILM COATED ORAL EVERY 8 HOURS
Status: DISCONTINUED | OUTPATIENT
Start: 2024-10-30 | End: 2024-11-02 | Stop reason: HOSPADM

## 2024-10-30 RX ORDER — HEPARIN SODIUM 5000 [USP'U]/ML
5000 INJECTION, SOLUTION INTRAVENOUS; SUBCUTANEOUS EVERY 8 HOURS
Status: DISCONTINUED | OUTPATIENT
Start: 2024-10-30 | End: 2024-10-30

## 2024-10-30 RX ORDER — CINACALCET 30 MG/1
60 TABLET, FILM COATED ORAL
Status: DISCONTINUED | OUTPATIENT
Start: 2024-10-31 | End: 2024-11-02 | Stop reason: HOSPADM

## 2024-10-30 RX ORDER — GLUCAGON 1 MG
1 KIT INJECTION
Status: DISCONTINUED | OUTPATIENT
Start: 2024-10-30 | End: 2024-11-02 | Stop reason: HOSPADM

## 2024-10-30 RX ORDER — ACETAMINOPHEN 325 MG/1
650 TABLET ORAL EVERY 4 HOURS PRN
Status: DISCONTINUED | OUTPATIENT
Start: 2024-10-30 | End: 2024-11-02 | Stop reason: HOSPADM

## 2024-10-30 RX ORDER — IBUPROFEN 200 MG
16 TABLET ORAL
Status: DISCONTINUED | OUTPATIENT
Start: 2024-10-30 | End: 2024-11-02 | Stop reason: HOSPADM

## 2024-10-30 RX ORDER — B-COMPLEX WITH VITAMIN C
1 TABLET ORAL DAILY
Status: DISCONTINUED | OUTPATIENT
Start: 2024-10-31 | End: 2024-11-02 | Stop reason: HOSPADM

## 2024-10-30 RX ORDER — ISOSORBIDE MONONITRATE 60 MG/1
120 TABLET, EXTENDED RELEASE ORAL DAILY
Status: DISCONTINUED | OUTPATIENT
Start: 2024-10-31 | End: 2024-11-02 | Stop reason: HOSPADM

## 2024-10-30 RX ORDER — ATORVASTATIN CALCIUM 40 MG/1
80 TABLET, FILM COATED ORAL DAILY
Status: DISCONTINUED | OUTPATIENT
Start: 2024-10-31 | End: 2024-11-02 | Stop reason: HOSPADM

## 2024-10-30 RX ORDER — SODIUM CHLORIDE 0.9 % (FLUSH) 0.9 %
10 SYRINGE (ML) INJECTION EVERY 12 HOURS PRN
Status: DISCONTINUED | OUTPATIENT
Start: 2024-10-30 | End: 2024-11-02 | Stop reason: HOSPADM

## 2024-10-30 RX ORDER — RANOLAZINE 500 MG/1
500 TABLET, EXTENDED RELEASE ORAL DAILY
Status: DISCONTINUED | OUTPATIENT
Start: 2024-10-31 | End: 2024-11-02 | Stop reason: HOSPADM

## 2024-10-30 RX ORDER — CLOPIDOGREL BISULFATE 75 MG/1
75 TABLET ORAL DAILY
Status: DISCONTINUED | OUTPATIENT
Start: 2024-10-31 | End: 2024-11-02 | Stop reason: HOSPADM

## 2024-10-30 RX ORDER — NALOXONE HCL 0.4 MG/ML
0.02 VIAL (ML) INJECTION
Status: DISCONTINUED | OUTPATIENT
Start: 2024-10-30 | End: 2024-11-02 | Stop reason: HOSPADM

## 2024-10-30 RX ORDER — CARVEDILOL 25 MG/1
50 TABLET ORAL 2 TIMES DAILY WITH MEALS
Status: DISCONTINUED | OUTPATIENT
Start: 2024-10-30 | End: 2024-11-02 | Stop reason: HOSPADM

## 2024-10-30 RX ORDER — ASPIRIN 325 MG
325 TABLET ORAL
Status: COMPLETED | OUTPATIENT
Start: 2024-10-30 | End: 2024-10-30

## 2024-10-30 RX ORDER — IBUPROFEN 200 MG
24 TABLET ORAL
Status: DISCONTINUED | OUTPATIENT
Start: 2024-10-30 | End: 2024-11-02 | Stop reason: HOSPADM

## 2024-10-30 RX ADMIN — NITROGLYCERIN 0.4 MG: 0.4 TABLET, ORALLY DISINTEGRATING SUBLINGUAL at 09:10

## 2024-10-30 RX ADMIN — HEPARIN SODIUM 5000 UNITS: 5000 INJECTION INTRAVENOUS; SUBCUTANEOUS at 09:10

## 2024-10-30 RX ADMIN — HYDRALAZINE HYDROCHLORIDE 100 MG: 50 TABLET ORAL at 09:10

## 2024-10-30 RX ADMIN — ASPIRIN 325 MG ORAL TABLET 325 MG: 325 PILL ORAL at 01:10

## 2024-10-30 RX ADMIN — CARVEDILOL 50 MG: 12.5 TABLET, FILM COATED ORAL at 05:10

## 2024-10-30 RX ADMIN — SEVELAMER CARBONATE 1600 MG: 800 TABLET, FILM COATED ORAL at 05:10

## 2024-10-30 RX ADMIN — MINOXIDIL 10 MG: 2.5 TABLET ORAL at 09:10

## 2024-10-30 RX ADMIN — NITROGLYCERIN 0.4 MG: 0.4 TABLET, ORALLY DISINTEGRATING SUBLINGUAL at 11:10

## 2024-10-30 RX ADMIN — HEPARIN SODIUM 12 UNITS/KG/HR: 10000 INJECTION, SOLUTION INTRAVENOUS at 11:10

## 2024-10-30 NOTE — ASSESSMENT & PLAN NOTE
Hx of RCC  S/p bilateral nephrectomies   Hx of RCC- first of R kidney in 2022 (s/p R nephrectomy May 2022), then of L kidney in 2023 (s/p L nephrectomy in 2023).   - HD as above

## 2024-10-30 NOTE — SUBJECTIVE & OBJECTIVE
Past Medical History:   Diagnosis Date    CAD (coronary artery disease), native coronary artery 11/21/2019    Cataract     Diabetes mellitus     Diabetic retinopathy     Dialysis patient     DM type 2 causing renal disease, not at goal     ESRD (end stage renal disease) started dialysis 01/2014 06/05/2014    History of colon polyps 02/10/2021    History of COVID-19 12/29/2022    Hyperparathyroidism, secondary renal 06/05/2014    Hypertension     NSTEMI (non-ST elevated myocardial infarction) 12/21/2013    Organ transplant candidate 06/05/2014    Palliative care encounter 10/29/2024    Pleural effusion 06/07/2024    Pneumonia     Post PTCA 08/26/2020    RCA [facility] 7-25-20    Renal cell carcinoma of right kidney     Renal hypertension     Stroke        Past Surgical History:   Procedure Laterality Date    ANGIOGRAM, CORONARY, WITH LEFT HEART CATHETERIZATION N/A 7/1/2021    Procedure: Angiogram, Coronary, with Left Heart Cath;  Surgeon: Miki Zendejas MD;  Location: [facility] CATH LAB;  Service: Cardiology;  Laterality: N/A;    ANGIOGRAM, CORONARY, WITH LEFT HEART CATHETERIZATION N/A 11/28/2023    Procedure: Angiogram, Coronary, with Left Heart Cath;  Surgeon: Noah Pendleton MD;  Location: [facility] CATH LAB;  Service: Cardiology;  Laterality: N/A;    COLONOSCOPY N/A 5/3/2017    Procedure: COLONOSCOPY;  Surgeon: Rufino Carpenter MD;  Location: [facility] (4TH FLR);  Service: Endoscopy;  Laterality: N/A;  pt states can only schedule on Wednesdays    COLONOSCOPY N/A 2/9/2021    Procedure: COLONOSCOPY;  Surgeon: Edil Wong MD;  Location: [facility] (4TH FLR);  Service: Endoscopy;  Laterality: N/A;  Dialysis MWF/ labwork day of procedure  right arm aceess  per Dr. Colin pt can hold Plavix 5 days prior see note- sm  COVID test on 2/6/21 at [facility] - sm    COLONOSCOPY N/A 2/10/2021    Procedure: COLONOSCOPY;  Surgeon: Robert Ayers MD;  Location: [facility] (4TH FLR);  Service: Endoscopy;  Laterality: N/A;  rescheduled due to poor  bowel prep-BB  negative covid screening 2/6/21-BB  dialysis M-W-F-BB  okay to r/s for 2/9/21 and to hold Plavix per Dr. KIRAN Wong-BB  labs same day-BB    CORONARY STENT PLACEMENT N/A 7/25/2019    Procedure: INSERTION, STENT, CORONARY ARTERY;  Surgeon: Miki Zendejas MD;  Location: [facility] CATH LAB;  Service: Cardiology;  Laterality: N/A;    DIALYSIS FISTULA CREATION      FISTULOGRAM N/A 2/18/2019    Procedure: Fistulogram;  Surgeon: Yaneth De La Cruz MD;  Location: [facility] CATH LAB;  Service: Cardiology;  Laterality: N/A;    FISTULOGRAM Right 7/23/2019    Procedure: Fistulogram;  Surgeon: Oz Cordoba MD;  Location: [facility] CATH LAB;  Service: Cardiology;  Laterality: Right;    LAPAROSCOPIC ROBOT-ASSISTED SURGICAL REMOVAL OF KIDNEY USING DA JAIME XI Right 5/19/2022    Procedure: XI ROBOTIC NEPHRECTOMY;  Surgeon: Aidan Hendricks MD;  Location: [address];  Service: Urology;  Laterality: Right;  3hrs    LAPAROSCOPIC ROBOT-ASSISTED SURGICAL REMOVAL OF KIDNEY USING DA JAIME XI Left 1/5/2023    Procedure: XI ROBOTIC NEPHRECTOMY;  Surgeon: Aidan Hendricks MD;  [facility];  Service: Urology;  Laterality: Left;  4 hrs    LEFT HEART CATHETERIZATION Left 7/25/2019    Procedure: Left heart cath;  Surgeon: Miki Zendejas MD;  Location: [facility] CATH LAB;  Service: Cardiology;  Laterality: Left;    REPAIR  1/5/2023    Procedure: REPAIR; COLOTOMY;  Surgeon: Maikel Lamb MD;  Location: [address];  Service: General;;    RETINAL LASER PROCEDURE Bilateral 2018 or 2017    Dr. Rothman    VASCULAR SURGERY         Review of patient's allergies indicates:   Allergen Reactions    No known allergies        Current Facility-Administered Medications on File Prior to Encounter   Medication    lidocaine (PF) 10 mg/ml (1%) injection 10 mg    [DISCONTINUED] acetaminophen tablet 650 mg    [DISCONTINUED] aspirin EC tablet 81 mg    [DISCONTINUED] atorvastatin tablet 80 mg    [DISCONTINUED] B-complex with vitamin  C tablet 1 tablet    [DISCONTINUED] carvediloL tablet 50 mg    [DISCONTINUED] cinacalcet tablet 60 mg    [DISCONTINUED] cloNIDine tablet 0.3 mg    [DISCONTINUED] clopidogreL tablet 75 mg    [DISCONTINUED] dextrose 10% bolus 125 mL 125 mL    [DISCONTINUED] dextrose 10% bolus 125 mL 125 mL    [DISCONTINUED] dextrose 10% bolus 250 mL 250 mL    [DISCONTINUED] dextrose 10% bolus 250 mL 250 mL    [DISCONTINUED] ezetimibe tablet 10 mg    [DISCONTINUED] glucagon (human recombinant) injection 1 mg    [DISCONTINUED] glucose chewable tablet 16 g    [DISCONTINUED] glucose chewable tablet 24 g    [DISCONTINUED] heparin (porcine) injection 5,000 Units    [DISCONTINUED] hydrALAZINE tablet 100 mg    [DISCONTINUED] isosorbide mononitrate 24 hr tablet 120 mg    [DISCONTINUED] melatonin tablet 6 mg    [DISCONTINUED] minoxidiL tablet 10 mg    [DISCONTINUED] naloxone 0.4 mg/mL injection 0.02 mg    [DISCONTINUED] NIFEdipine 24 hr tablet 90 mg    [DISCONTINUED] nitroGLYCERIN SL tablet 0.4 mg    [DISCONTINUED] ondansetron injection 4 mg    [DISCONTINUED] polyethylene glycol packet 17 g    [DISCONTINUED] ranolazine 12 hr tablet 500 mg    [DISCONTINUED] sevelamer carbonate tablet 1,600 mg    [DISCONTINUED] sodium chloride 0.9% flush 10 mL     Current Outpatient Medications on File Prior to Encounter   Medication Sig    aspirin (ECOTRIN) 81 MG EC tablet Take 1 tablet (81 mg total) by mouth once daily.    atorvastatin (LIPITOR) 80 MG tablet Take 1 tablet (80 mg total) by mouth once daily.    carvediloL (COREG) 25 MG tablet Take 2 tablets (50 mg total) by mouth 2 (two) times daily with meals.    clopidogreL (PLAVIX) 75 mg tablet Take 1 tablet (75 mg total) by mouth once daily.    epoetin philip (PROCRIT) 4,000 unit/mL injection Inject 1.93 mLs (7,720 Units total) into the skin every Mon, Wed, Fri.    ezetimibe (ZETIA) 10 mg tablet Take 1 tablet (10 mg total) by mouth once daily.    hydrALAZINE (APRESOLINE) 100 MG tablet Take 1 tablet (100 mg  total) by mouth every 8 (eight) hours.    isosorbide mononitrate (IMDUR) 120 MG 24 hr tablet Take 1 tablet (120 mg total) by mouth once daily.    methoxy peg-epoetin beta (MIRCERA INJ) 150 mcg.    minoxidiL (LONITEN) 10 MG Tab Take 1 tablet (10 mg total) by mouth 2 (two) times daily.    NIFEdipine (PROCARDIA-XL) 90 MG (OSM) 24 hr tablet Take 1 tablet (90 mg total) by mouth once daily.    nitroGLYCERIN (NITROSTAT) 0.4 MG SL tablet Place 1 tablet (0.4 mg total) under the tongue every 5 (five) minutes as needed for Chest pain.    polyethylene glycol (GLYCOLAX) 17 gram/dose powder Take 17 g by mouth daily as needed for Constipation.    ranolazine (RANEXA) 500 MG Tb12 Take 1 tablet (500 mg total) by mouth Daily.    SENSIPAR 60 mg Tab Take 60 mg by mouth daily with breakfast.    sevelamer carbonate (RENVELA) 800 mg Tab Take 2 tablets (1,600 mg total) by mouth 3 (three) times daily with meals.    vit B complex-C-folic ac-zinc (RENAPLEX) 800 mcg- 12.5 mg Tab Take 1 tablet by mouth.    XPHOZAH 20 mg Tab Take 20 mg by mouth 2 (two) times a day.     Family History       Problem Relation (Age of Onset)    Cancer Maternal Grandfather    Cataracts Mother    Diabetes Mother    Heart disease Brother    Hypertension Mother, Father, Sister, Brother    Kidney disease Brother          Tobacco Use    Smoking status: Never    Smokeless tobacco: Never   Substance and Sexual Activity    Alcohol use: Not Currently     Comment: One drink a month    Drug use: No    Sexual activity: Yes     Partners: Female     Birth control/protection: None     Review of Systems   Cardiovascular:  Positive for chest pain. Negative for dyspnea on exertion, irregular heartbeat, near-syncope, palpitations and syncope.   Respiratory:  Negative for shortness of breath.      Objective:     Vital Signs (Most Recent):  Temp: 97.9 °F (36.6 °C) (10/30/24 1243)  Pulse: 68 (10/30/24 1603)  Resp: 14 (10/30/24 1603)  BP: (!) 154/105 (10/30/24 1603)  SpO2: 100 % (10/30/24  1603) Vital Signs (24h Range):  Temp:  [97.8 °F (36.6 °C)-97.9 °F (36.6 °C)] 97.9 °F (36.6 °C)  Pulse:  [68-78] 68  Resp:  [14-19] 14  SpO2:  [97 %-100 %] 100 %  BP: (116-156)/() 154/105     Weight: 68 kg (149 lb 14.6 oz)  Body mass index is 23.48 kg/m².    SpO2: 100 %       No intake or output data in the 24 hours ending 10/30/24 1647    Lines/Drains/Airways       Peripheral Intravenous Line  Duration                  Hemodialysis AV Fistula Right forearm -- days         Peripheral IV - Single Lumen 10/30/24 1246 20 G Left Antecubital <1 day                     Physical Exam  Vitals reviewed.   Constitutional:       General: He is not in acute distress.     Appearance: He is not ill-appearing or diaphoretic.   Eyes:      General: No scleral icterus.     Extraocular Movements: Extraocular movements intact.   Cardiovascular:      Rate and Rhythm: Normal rate and regular rhythm.      Pulses: Normal pulses.      Heart sounds: No murmur heard.     No friction rub. No gallop.   Pulmonary:      Effort: Pulmonary effort is normal. No respiratory distress.      Breath sounds: Normal breath sounds. No wheezing or rales.   Abdominal:      General: There is no distension.      Palpations: Abdomen is soft.   Musculoskeletal:      Right lower leg: No edema.      Left lower leg: No edema.   Skin:     General: Skin is warm and dry.   Neurological:      Mental Status: He is alert and oriented to person, place, and time.          Significant Labs: CMP   Recent Labs   Lab 10/29/24  0258 10/30/24  1253   * 133*   K 4.4 3.7   CL 98 101   CO2 23 22*   GLU 88 217*   BUN 55* 38*   CREATININE 9.2* 7.8*   CALCIUM 8.6* 8.6*   PROT 7.1 7.0   ALBUMIN 3.1* 3.1*   BILITOT 0.6 0.6   ALKPHOS 60 59   AST 13 15   ALT 10 9*   ANIONGAP 14 10   , CBC   Recent Labs   Lab 10/29/24  0258 10/30/24  1253   WBC 6.95 5.53   HGB 9.9* 10.4*   HCT 31.2* 31.2*    218   , and Troponin   Recent Labs   Lab 10/30/24  1253   TROPONINI 0.754*        Significant Imaging: EKG: Unchanged from previous.

## 2024-10-30 NOTE — SUBJECTIVE & OBJECTIVE
Past Medical History:   Diagnosis Date    CAD (coronary artery disease), native coronary artery 11/21/2019    Cataract     Diabetes mellitus     Diabetic retinopathy     Dialysis patient     DM type 2 causing renal disease, not at goal     ESRD (end stage renal disease) started dialysis 01/2014 06/05/2014    History of colon polyps 02/10/2021    History of COVID-19 12/29/2022    Hyperparathyroidism, secondary renal 06/05/2014    Hypertension     NSTEMI (non-ST elevated myocardial infarction) 12/21/2013    Organ transplant candidate 06/05/2014    Palliative care encounter 10/29/2024    Pleural effusion 06/07/2024    Pneumonia     Post PTCA 08/26/2020    RCA [facility] 7-25-20    Renal cell carcinoma of right kidney     Renal hypertension     Stroke        Past Surgical History:   Procedure Laterality Date    ANGIOGRAM, CORONARY, WITH LEFT HEART CATHETERIZATION N/A 7/1/2021    Procedure: Angiogram, Coronary, with Left Heart Cath;  Surgeon: Miki Zendejas MD;  Location: [facility] CATH LAB;  Service: Cardiology;  Laterality: N/A;    ANGIOGRAM, CORONARY, WITH LEFT HEART CATHETERIZATION N/A 11/28/2023    Procedure: Angiogram, Coronary, with Left Heart Cath;  Surgeon: Noah Pendleton MD;  Location: [facility] CATH LAB;  Service: Cardiology;  Laterality: N/A;    COLONOSCOPY N/A 5/3/2017    Procedure: COLONOSCOPY;  Surgeon: Rufino Carpenter MD;  Location: [facility] (4TH FLR);  Service: Endoscopy;  Laterality: N/A;  pt states can only schedule on Wednesdays    COLONOSCOPY N/A 2/9/2021    Procedure: COLONOSCOPY;  Surgeon: Edil Wong MD;  Location: [facility] (4TH FLR);  Service: Endoscopy;  Laterality: N/A;  Dialysis MWF/ labwork day of procedure  right arm aceess  per Dr. Colin pt can hold Plavix 5 days prior see note- sm  COVID test on 2/6/21 at [facility] - sm    COLONOSCOPY N/A 2/10/2021    Procedure: COLONOSCOPY;  Surgeon: Robert Ayers MD;  Location: [facility] (4TH FLR);  Service: Endoscopy;  Laterality: N/A;  rescheduled due to poor  bowel prep-BB  negative covid screening 2/6/21-BB  dialysis M-W-F-BB  okay to r/s for 2/9/21 and to hold Plavix per Dr. KIRAN Wong-BB  labs same day-BB    CORONARY STENT PLACEMENT N/A 7/25/2019    Procedure: INSERTION, STENT, CORONARY ARTERY;  Surgeon: Miki Zendejas MD;  Location: [facility] CATH LAB;  Service: Cardiology;  Laterality: N/A;    DIALYSIS FISTULA CREATION      FISTULOGRAM N/A 2/18/2019    Procedure: Fistulogram;  Surgeon: Yaneth De La Cruz MD;  Location: [facility] CATH LAB;  Service: Cardiology;  Laterality: N/A;    FISTULOGRAM Right 7/23/2019    Procedure: Fistulogram;  Surgeon: Oz Cordoba MD;  Location: [facility] CATH LAB;  Service: Cardiology;  Laterality: Right;    LAPAROSCOPIC ROBOT-ASSISTED SURGICAL REMOVAL OF KIDNEY USING DA JAIME XI Right 5/19/2022    Procedure: XI ROBOTIC NEPHRECTOMY;  Surgeon: Aidan Hendricks MD;  Location: [address];  Service: Urology;  Laterality: Right;  3hrs    LAPAROSCOPIC ROBOT-ASSISTED SURGICAL REMOVAL OF KIDNEY USING DA JAIME XI Left 1/5/2023    Procedure: XI ROBOTIC NEPHRECTOMY;  Surgeon: Aidan Hendricks MD;  [facility];  Service: Urology;  Laterality: Left;  4 hrs    LEFT HEART CATHETERIZATION Left 7/25/2019    Procedure: Left heart cath;  Surgeon: Miki Zendejas MD;  Location: [facility] CATH LAB;  Service: Cardiology;  Laterality: Left;    REPAIR  1/5/2023    Procedure: REPAIR; COLOTOMY;  Surgeon: Maikel Lamb MD;  Location: [address];  Service: General;;    RETINAL LASER PROCEDURE Bilateral 2018 or 2017    Dr. Rothman    VASCULAR SURGERY         Review of patient's allergies indicates:   Allergen Reactions    No known allergies        Current Facility-Administered Medications on File Prior to Encounter   Medication    lidocaine (PF) 10 mg/ml (1%) injection 10 mg    [DISCONTINUED] acetaminophen tablet 650 mg    [DISCONTINUED] aspirin EC tablet 81 mg    [DISCONTINUED] atorvastatin tablet 80 mg    [DISCONTINUED] B-complex with vitamin  C tablet 1 tablet    [DISCONTINUED] carvediloL tablet 50 mg    [DISCONTINUED] cinacalcet tablet 60 mg    [DISCONTINUED] cloNIDine tablet 0.3 mg    [DISCONTINUED] clopidogreL tablet 75 mg    [DISCONTINUED] dextrose 10% bolus 125 mL 125 mL    [DISCONTINUED] dextrose 10% bolus 125 mL 125 mL    [DISCONTINUED] dextrose 10% bolus 250 mL 250 mL    [DISCONTINUED] dextrose 10% bolus 250 mL 250 mL    [DISCONTINUED] ezetimibe tablet 10 mg    [DISCONTINUED] glucagon (human recombinant) injection 1 mg    [DISCONTINUED] glucose chewable tablet 16 g    [DISCONTINUED] glucose chewable tablet 24 g    [DISCONTINUED] heparin (porcine) injection 5,000 Units    [DISCONTINUED] hydrALAZINE tablet 100 mg    [DISCONTINUED] isosorbide mononitrate 24 hr tablet 120 mg    [DISCONTINUED] melatonin tablet 6 mg    [DISCONTINUED] minoxidiL tablet 10 mg    [DISCONTINUED] naloxone 0.4 mg/mL injection 0.02 mg    [DISCONTINUED] NIFEdipine 24 hr tablet 90 mg    [DISCONTINUED] nitroGLYCERIN SL tablet 0.4 mg    [DISCONTINUED] ondansetron injection 4 mg    [DISCONTINUED] polyethylene glycol packet 17 g    [DISCONTINUED] ranolazine 12 hr tablet 500 mg    [DISCONTINUED] sevelamer carbonate tablet 1,600 mg    [DISCONTINUED] sodium chloride 0.9% flush 10 mL     Current Outpatient Medications on File Prior to Encounter   Medication Sig    aspirin (ECOTRIN) 81 MG EC tablet Take 1 tablet (81 mg total) by mouth once daily.    atorvastatin (LIPITOR) 80 MG tablet Take 1 tablet (80 mg total) by mouth once daily.    carvediloL (COREG) 25 MG tablet Take 2 tablets (50 mg total) by mouth 2 (two) times daily with meals.    clopidogreL (PLAVIX) 75 mg tablet Take 1 tablet (75 mg total) by mouth once daily.    epoetin philip (PROCRIT) 4,000 unit/mL injection Inject 1.93 mLs (7,720 Units total) into the skin every Mon, Wed, Fri.    ezetimibe (ZETIA) 10 mg tablet Take 1 tablet (10 mg total) by mouth once daily.    hydrALAZINE (APRESOLINE) 100 MG tablet Take 1 tablet (100 mg  total) by mouth every 8 (eight) hours.    isosorbide mononitrate (IMDUR) 120 MG 24 hr tablet Take 1 tablet (120 mg total) by mouth once daily.    methoxy peg-epoetin beta (MIRCERA INJ) 150 mcg.    minoxidiL (LONITEN) 10 MG Tab Take 1 tablet (10 mg total) by mouth 2 (two) times daily.    NIFEdipine (PROCARDIA-XL) 90 MG (OSM) 24 hr tablet Take 1 tablet (90 mg total) by mouth once daily.    nitroGLYCERIN (NITROSTAT) 0.4 MG SL tablet Place 1 tablet (0.4 mg total) under the tongue every 5 (five) minutes as needed for Chest pain.    polyethylene glycol (GLYCOLAX) 17 gram/dose powder Take 17 g by mouth daily as needed for Constipation.    ranolazine (RANEXA) 500 MG Tb12 Take 1 tablet (500 mg total) by mouth Daily.    SENSIPAR 60 mg Tab Take 60 mg by mouth daily with breakfast.    sevelamer carbonate (RENVELA) 800 mg Tab Take 2 tablets (1,600 mg total) by mouth 3 (three) times daily with meals.    vit B complex-C-folic ac-zinc (RENAPLEX) 800 mcg- 12.5 mg Tab Take 1 tablet by mouth.    XPHOZAH 20 mg Tab Take 20 mg by mouth 2 (two) times a day.     Family History       Problem Relation (Age of Onset)    Cancer Maternal Grandfather    Cataracts Mother    Diabetes Mother    Heart disease Brother    Hypertension Mother, Father, Sister, Brother    Kidney disease Brother          Tobacco Use    Smoking status: Never    Smokeless tobacco: Never   Substance and Sexual Activity    Alcohol use: Not Currently     Comment: One drink a month    Drug use: No    Sexual activity: Yes     Partners: Female     Birth control/protection: None     Review of Systems   Constitutional:  Negative for chills, diaphoresis and fever.   HENT:  Negative for congestion, sore throat and trouble swallowing.    Eyes:  Negative for visual disturbance.   Respiratory:  Negative for cough, shortness of breath and wheezing.    Cardiovascular:  Negative for chest pain, palpitations and leg swelling.   Gastrointestinal:  Negative for abdominal pain, diarrhea,  nausea and vomiting.   Genitourinary:         Anuric   Musculoskeletal:  Negative for arthralgias.   Skin:  Negative for wound.   Neurological:  Negative for dizziness, light-headedness and headaches.   Psychiatric/Behavioral:  Negative for behavioral problems, confusion and decreased concentration.      Objective:     Vital Signs (Most Recent):  Temp: 97.9 °F (36.6 °C) (10/30/24 1243)  Pulse: 68 (10/30/24 1603)  Resp: 14 (10/30/24 1603)  BP: (!) 154/105 (10/30/24 1603)  SpO2: 100 % (10/30/24 1603) Vital Signs (24h Range):  Temp:  [97.8 °F (36.6 °C)-97.9 °F (36.6 °C)] 97.9 °F (36.6 °C)  Pulse:  [68-78] 68  Resp:  [14-19] 14  SpO2:  [97 %-100 %] 100 %  BP: (116-156)/() 154/105     Weight: 68 kg (149 lb 14.6 oz)  Body mass index is 23.48 kg/m².     Physical Exam  Vitals and nursing note reviewed.   Constitutional:       General: He is not in acute distress.     Appearance: He is not toxic-appearing or diaphoretic.   HENT:      Head: Normocephalic and atraumatic.      Right Ear: External ear normal.      Left Ear: External ear normal.      Nose: Nose normal.      Mouth/Throat:      Pharynx: Oropharynx is clear.   Eyes:      General: No scleral icterus.  Cardiovascular:      Rate and Rhythm: Normal rate and regular rhythm.      Pulses: Normal pulses.      Heart sounds: Normal heart sounds.   Pulmonary:      Effort: Pulmonary effort is normal.      Breath sounds: Normal breath sounds. No wheezing, rhonchi or rales.   Abdominal:      General: Bowel sounds are normal.      Palpations: Abdomen is soft.      Tenderness: There is no abdominal tenderness.   Musculoskeletal:         General: No tenderness.      Cervical back: Normal range of motion and neck supple.      Right lower leg: No edema.      Left lower leg: No edema.   Lymphadenopathy:      Cervical: No cervical adenopathy.   Skin:     General: Skin is warm and dry.      Capillary Refill: Capillary refill takes less than 2 seconds.      Comments: L UE w/  fistula, + thrill   Neurological:      Mental Status: He is alert and oriented to person, place, and time.   Psychiatric:         Behavior: Behavior normal.                Significant Labs: All pertinent labs within the past 24 hours have been reviewed.  Recent Results (from the past 24 hours)   EKG 12-lead    Collection Time: 10/30/24 12:48 PM   Result Value Ref Range    QRS Duration 112 ms    [facility] QTC Calculation 477 ms   CBC auto differential    Collection Time: 10/30/24 12:53 PM   Result Value Ref Range    WBC 5.53 3.90 - 12.70 K/uL    RBC 3.33 (L) 4.60 - 6.20 M/uL    Hemoglobin 10.4 (L) 14.0 - 18.0 g/dL    Hematocrit 31.2 (L) 40.0 - 54.0 %    MCV 94 82 - 98 fL    MCH 31.2 (H) 27.0 - 31.0 pg    MCHC 33.3 32.0 - 36.0 g/dL    RDW 15.4 (H) 11.5 - 14.5 %    Platelets 218 150 - 450 K/uL    MPV 10.1 9.2 - 12.9 fL    Immature Granulocytes 0.4 0.0 - 0.5 %    Gran # (ANC) 3.3 1.8 - 7.7 K/uL    Immature Grans (Abs) 0.02 0.00 - 0.04 K/uL    Lymph # 1.3 1.0 - 4.8 K/uL    Mono # 0.6 0.3 - 1.0 K/uL    Eos # 0.3 0.0 - 0.5 K/uL    Baso # 0.06 0.00 - 0.20 K/uL    nRBC 0 0 /100 WBC    Gran % 60.2 38.0 - 73.0 %    Lymph % 22.6 18.0 - 48.0 %    Mono % 9.9 4.0 - 15.0 %    Eosinophil % 5.8 0.0 - 8.0 %    Basophil % 1.1 0.0 - 1.9 %    Differential Method Automated    Comprehensive metabolic panel    Collection Time: 10/30/24 12:53 PM   Result Value Ref Range    Sodium 133 (L) 136 - 145 mmol/L    Potassium 3.7 3.5 - 5.1 mmol/L    Chloride 101 95 - 110 mmol/L    CO2 22 (L) 23 - 29 mmol/L    Glucose 217 (H) 70 - 110 mg/dL    BUN 38 (H) 6 - 20 mg/dL    Creatinine 7.8 (H) 0.5 - 1.4 mg/dL    Calcium 8.6 (L) 8.7 - 10.5 mg/dL    Total Protein 7.0 6.0 - 8.4 g/dL    Albumin 3.1 (L) 3.5 - 5.2 g/dL    Total Bilirubin 0.6 0.1 - 1.0 mg/dL    Alkaline Phosphatase 59 40 - 150 U/L    AST 15 10 - 40 U/L    ALT 9 (L) 10 - 44 U/L    eGFR 7.9 (A) >60 mL/min/1.73 m^2    Anion Gap 10 8 - 16 mmol/L   Troponin I #1    Collection Time: 10/30/24 12:53 PM    Result Value Ref Range    Troponin I 0.754 (H) 0.000 - 0.026 ng/mL   B-Type natriuretic peptide (BNP)    Collection Time: 10/30/24 12:53 PM   Result Value Ref Range     (H) 0 - 99 pg/mL   Magnesium    Collection Time: 10/30/24 12:53 PM   Result Value Ref Range    Magnesium 1.9 1.6 - 2.6 mg/dL         Significant Imaging: I have reviewed all pertinent imaging results/findings within the past 24 hours.  Imaging Results              X-Ray Chest AP Portable (Final result)  Result time 10/30/24 14:02:16      Final result by Edil Tidwell MD (10/30/24 14:02:16)                   Impression:      No significant detrimental interval change in the appearance of the chest since 10/28/2024 is appreciated.      Electronically signed by: Edil Tidwell MD  Date:    10/30/2024  Time:    14:02               Narrative:    EXAMINATION:  XR CHEST AP PORTABLE    CLINICAL HISTORY:  Chest Pain;    TECHNIQUE:  One view    COMPARISON:  Comparison is made to 10/28/2024.    FINDINGS:  Heart size and the appearance of the cardiomediastinal silhouette and pulmonary vascularity demonstrate no detrimental change since the recent prior examination referenced above.  Lung zones are stable as well, with no evidence of superimposed airspace consolidation or volume loss observed.  No pleural fluid.  No abnormal mediastinal widening.  No pneumothorax.

## 2024-10-30 NOTE — ASSESSMENT & PLAN NOTE
Triple-vessel Coronary artery disease  Hypertensive emergency  Type II NSTEMI (demand ischemia)      Notable CAD hx w/ multiple presentations/admissions for chest pain and Type 2 NSTEMI/NSTEMI/elevated troponin. HILLARY placed to RCA in 2020. Most recently, [facility] (Nov 2023) noted 3VD. Apparently pt's anatomy is not amenable to revascularization, and has been advised per cardiology to manage medically. Presented w/ L-sided chest pain and troponin elevation. EKG w/ nonspecific ST and T-wave abnormalities.     - F/u repeat trop  - Cardiology consulted: known TVD; cardiology team to discuss further to determine if feasible to attempt further catheterization - cardiac PET  - Continue home DAPT & statin  - Continue home BB  - BP mgmt as per HTN; optimize  -continue to monitor on tele  - SL nitro prn

## 2024-10-30 NOTE — ED PROVIDER NOTES
Encounter Date: 10/30/2024       History     Chief Complaint   Patient presents with    Chest Pain     Chest pain during dialysis session, only received 2 hours of treatment     Haroldo Dickinson is a 48yo M w/ ESRD on HD MWF ,CAD (s/p HILLARY of RCA 2020) s/p [facility] Nov 2023 with three vessel disease deemed not a stent or surgical candidate, HTN, T2DM, CVA w/ residual R-sided deficits and RCC s/p bilateral nephrectomies who presented to the ED with chest pain. It started this morning while at dialysis. He completed 2 hours of dialysis this morning. He describes intermittent sharp pains that radiate into his left arm. He has associated shortness of breath and palpitations. He denies fever, cough, lightheadedness, syncope, LE swelling.     Of note, [place] [facility] [place] being admitted for chest pain and hypertensive emergency. ECHO preformed at that time with ejection fraction of 40 - 45%. Cardiology was consulted. Symptoms believed 2/2 hypertensive emergency and volume overload status. He underwent dialysis with improvement of pain.   His antihypertensive regimen was also increased; specifically, minoxidil increased to 10 mg b.i.d. and per Cardiology recommendations, Ranexa added    The history is provided by the patient.     Review of patient's allergies indicates:   Allergen Reactions    No known allergies      Past Medical History:   Diagnosis Date    CAD (coronary artery disease), native coronary artery 11/21/2019    Cataract     Diabetes mellitus     Diabetic retinopathy     Dialysis patient     DM type 2 causing renal disease, not at goal     ESRD (end stage renal disease) started dialysis 01/2014 06/05/2014    History of colon polyps 02/10/2021    History of COVID-19 12/29/2022    Hyperparathyroidism, secondary renal 06/05/2014    Hypertension     NSTEMI (non-ST elevated myocardial infarction) 12/21/2013    Organ transplant candidate 06/05/2014    Palliative care encounter 10/29/2024    Pleural  effusion 06/07/2024    Pneumonia     Post PTCA 08/26/2020    RCA HILLARY 7-25-20    Renal cell carcinoma of right kidney     Renal hypertension     Stroke      Past Surgical History:   Procedure Laterality Date    ANGIOGRAM, CORONARY, WITH LEFT HEART CATHETERIZATION N/A 7/1/2021    Procedure: Angiogram, Coronary, with Left Heart Cath;  Surgeon: Miki Zendejas MD;  Location: [facility] CATH LAB;  Service: Cardiology;  Laterality: N/A;    ANGIOGRAM, CORONARY, WITH LEFT HEART CATHETERIZATION N/A 11/28/2023    Procedure: Angiogram, Coronary, with Left Heart Cath;  Surgeon: Noah Pendleton MD;  Location: [facility] CATH LAB;  Service: Cardiology;  Laterality: N/A;    COLONOSCOPY N/A 5/3/2017    Procedure: COLONOSCOPY;  Surgeon: Rufino Carpenter MD;  [facility]  Service: Endoscopy;  Laterality: N/A;  pt states can only schedule on Wednesdays    COLONOSCOPY N/A 2/9/2021    Procedure: COLONOSCOPY;  Surgeon: Edil Wong MD;  [facility]  Service: Endoscopy;  Laterality: N/A;  Dialysis MWF/ labwork day of procedure  right arm aceess  per Dr. Colin pt can hold Plavix 5 days prior see note- sm  COVID test on 2/6/21 at [facility] - sm    COLONOSCOPY N/A 2/10/2021    Procedure: COLONOSCOPY;  Surgeon: Robert Ayers MD;  [facility]  Service: Endoscopy;  Laterality: N/A;  rescheduled due to poor bowel prep-BB  negative covid screening 2/6/21-BB  dialysis M-W-F-BB  okay to r/s for 2/9/21 and to hold Plavix per Dr. KIRAN Wong-BB  labs same day-BB    CORONARY STENT PLACEMENT N/A 7/25/2019    Procedure: INSERTION, STENT, CORONARY ARTERY;  Surgeon: Miki Zendejas MD;  Location: [facility] CATH LAB;  Service: Cardiology;  Laterality: N/A;    DIALYSIS FISTULA CREATION      FISTULOGRAM N/A 2/18/2019    Procedure: Fistulogram;  Surgeon: Yaneth De La Cruz MD;  Location: [facility] CATH LAB;  Service: Cardiology;  Laterality: N/A;    FISTULOGRAM Right 7/23/2019    Procedure: Fistulogram;  Surgeon: Oz BECK  MD Adalid;  Location: [facility] CATH LAB;  Service: Cardiology;  Laterality: Right;    LAPAROSCOPIC ROBOT-ASSISTED SURGICAL REMOVAL OF KIDNEY USING DA JAIME XI Right 5/19/2022    Procedure: XI ROBOTIC NEPHRECTOMY;  Surgeon: Aidan Hendricks MD;  [address]  Service: Urology;  Laterality: Right;  3hrs    LAPAROSCOPIC ROBOT-ASSISTED SURGICAL REMOVAL OF KIDNEY USING DA JAIME XI Left 1/5/2023    Procedure: XI ROBOTIC NEPHRECTOMY;  Surgeon: Aidan Hendricks MD;  [address]  Service: Urology;  Laterality: Left;  4 hrs    LEFT HEART CATHETERIZATION Left 7/25/2019    Procedure: Left heart cath;  Surgeon: Miki Zendejas MD;  Location: [facility] CATH LAB;  Service: Cardiology;  Laterality: Left;    REPAIR  1/5/2023    Procedure: REPAIR; COLOTOMY;  Surgeon: Maikel Lamb MD;  Location: [address];  Service: General;;    RETINAL LASER PROCEDURE Bilateral 2018 or 2017    Dr. Rothman    VASCULAR SURGERY       Family History   Problem Relation Name Age of Onset    Hypertension Mother      Diabetes Mother      Cataracts Mother      Hypertension Father      Hypertension Sister      Kidney disease Brother      Hypertension Brother      Heart disease Brother      Cancer Maternal Grandfather          Colon CA    Colon cancer Neg Hx      Esophageal cancer Neg Hx      Stomach cancer Neg Hx      Rectal cancer Neg Hx      Ulcerative colitis Neg Hx      Irritable bowel syndrome Neg Hx      Crohn's disease Neg Hx      Celiac disease Neg Hx      Glaucoma Neg Hx      Macular degeneration Neg Hx       Social History     Tobacco Use    Smoking status: Never    Smokeless tobacco: Never   Substance Use Topics    Alcohol use: Not Currently     Comment: One drink a month    Drug use: No     Review of Systems   Constitutional:  Negative for fever.   Respiratory:  Positive for shortness of breath. Negative for cough and wheezing.    Cardiovascular:  Positive for chest pain and palpitations. Negative for leg  swelling.   Gastrointestinal:  Negative for abdominal pain, nausea and vomiting.   Neurological:  Negative for dizziness and light-headedness.       Physical Exam     Initial Vitals [10/30/24 1223]   BP Pulse Resp Temp SpO2   116/63 78 16 97.8 °F (36.6 °C) 97 %      MAP       --         Physical Exam    Nursing note and vitals reviewed.  Constitutional: He appears well-developed and well-nourished. He is not diaphoretic. No distress.   HENT:   Head: Normocephalic and atraumatic.   Nose: Nose normal.   Eyes: Conjunctivae and EOM are normal.   Neck: Neck supple.   Cardiovascular:  Normal rate, regular rhythm, normal heart sounds and intact distal pulses.           Pulmonary/Chest: Breath sounds normal. No respiratory distress.   Musculoskeletal:      Cervical back: Neck supple.      Right lower leg: No edema.      Left lower leg: No edema.      Comments: R arm AV fistula      Neurological: He is alert and oriented to person, place, and time.   Skin: No rash noted.   Psychiatric: He has a normal mood and affect. Thought content normal.         ED Course   Procedures  Labs Reviewed   CBC W/ AUTO DIFFERENTIAL - Abnormal       Result Value    WBC 5.53      RBC 3.33 (*)     Hemoglobin 10.4 (*)     Hematocrit 31.2 (*)     MCV 94      MCH 31.2 (*)     MCHC 33.3      RDW 15.4 (*)     Platelets 218      MPV 10.1      Immature Granulocytes 0.4      Gran # (ANC) 3.3      Immature Grans (Abs) 0.02      Lymph # 1.3      Mono # 0.6      Eos # 0.3      Baso # 0.06      nRBC 0      Gran % 60.2      Lymph % 22.6      Mono % 9.9      Eosinophil % 5.8      Basophil % 1.1      Differential Method Automated     COMPREHENSIVE METABOLIC PANEL - Abnormal    Sodium 133 (*)     Potassium 3.7      Chloride 101      CO2 22 (*)     Glucose 217 (*)     BUN 38 (*)     Creatinine 7.8 (*)     Calcium 8.6 (*)     Total Protein 7.0      Albumin 3.1 (*)     Total Bilirubin 0.6      Alkaline Phosphatase 59      AST 15      ALT 9 (*)     eGFR 7.9 (*)      Anion Gap 10      Narrative:     add on [id number] Arthur Zavalaan, DO  10/30/2024  13:25 T.Nakul   TROPONIN I - Abnormal    Troponin I 0.754 (*)     Narrative:     add on [id number] Arthur Wyatt, DO  10/30/2024  13:25 T.Nakul   B-TYPE NATRIURETIC PEPTIDE - Abnormal     (*)     Narrative:     add on [id number] Arthur Wyatt, DO  10/30/2024  13:25 T.Nakul   MAGNESIUM   MAGNESIUM    Magnesium 1.9      Narrative:     add on [id number] Arthur Wyatt, DO  10/30/2024  13:25 T.Nakul   TROPONIN I     EKG Readings: (Independently Interpreted)   Initial Reading: No STEMI. Rhythm: Normal Sinus Rhythm. Heart Rate: 73. Axis: Normal. Clinical Impression: Normal Sinus Rhythm   T wave inversion int he lateral leads though similar to prior      ECG Results              EKG 12-lead (Final result)        Collection Time Result Time QRS Duration OHS QTC Calculation    10/30/24 12:48:19 10/30/24 12:52:26 112 477                     Final result by Interface, Lab In [facility] (10/30/24 12:52:33)                   Narrative:    Test Reason : R07.9,    Vent. Rate : 076 BPM     Atrial Rate : 076 BPM     P-R Int : 182 ms          QRS Dur : 112 ms      QT Int : 424 ms       P-R-T Axes : 086 049 099 degrees     QTc Int : 477 ms    Normal sinus rhythm  LVH with repolarization abnormality  Abnormal ECG  When compared with ECG of 28-OCT-2024 14:56,  No significant change was found  Confirmed by Juno SIMPSON, Hamilton PATEL ([id number]) on 10/30/2024 12:52:22 PM    Referred By: [email or web address]   SELF           Confirmed By:Hamilton Fraire MD                                  Imaging Results              X-Ray Chest AP Portable (Final result)  Result time 10/30/24 14:02:16      Final result by Edil Tidwell MD (10/30/24 14:02:16)                   Impression:      No significant detrimental interval change in the appearance of the chest since 10/28/2024 is appreciated.      Electronically signed by: Edil Tidwell MD  Date:    10/30/2024  Time:    14:02                Narrative:    EXAMINATION:  XR CHEST AP PORTABLE    CLINICAL HISTORY:  Chest Pain;    TECHNIQUE:  One view    COMPARISON:  Comparison is made to 10/28/2024.    FINDINGS:  Heart size and the appearance of the cardiomediastinal silhouette and pulmonary vascularity demonstrate no detrimental change since the recent prior examination referenced above.  Lung zones are stable as well, with no evidence of superimposed airspace consolidation or volume loss observed.  No pleural fluid.  No abnormal mediastinal widening.  No pneumothorax.                                       Medications   aspirin EC tablet 81 mg (has no administration in time range)   B-complex with vitamin C tablet 1 tablet (has no administration in time range)   atorvastatin tablet 80 mg (has no administration in time range)   carvediloL tablet 50 mg (has no administration in time range)   cinacalcet tablet 60 mg (has no administration in time range)   clopidogreL tablet 75 mg (has no administration in time range)   ezetimibe tablet 10 mg (has no administration in time range)   heparin (porcine) injection 5,000 Units (has no administration in time range)   hydrALAZINE tablet 100 mg (has no administration in time range)   isosorbide mononitrate 24 hr tablet 120 mg (has no administration in time range)   minoxidiL tablet 10 mg (has no administration in time range)   NIFEdipine 24 hr tablet 90 mg (has no administration in time range)   polyethylene glycol packet 17 g (has no administration in time range)   sevelamer carbonate tablet 1,600 mg (has no administration in time range)   acetaminophen tablet 650 mg (has no administration in time range)   cloNIDine tablet 0.3 mg (has no administration in time range)   dextrose 10% bolus 125 mL 125 mL (has no administration in time range)   dextrose 10% bolus 125 mL 125 mL (has no administration in time range)   dextrose 10% bolus 250 mL 250 mL (has no administration in time range)   dextrose 10% bolus 250 mL  250 mL (has no administration in time range)   glucagon (human recombinant) injection 1 mg (has no administration in time range)   glucose chewable tablet 16 g (has no administration in time range)   glucose chewable tablet 24 g (has no administration in time range)   melatonin tablet 6 mg (has no administration in time range)   naloxone 0.4 mg/mL injection 0.02 mg (has no administration in time range)   nitroGLYCERIN SL tablet 0.4 mg (has no administration in time range)   ondansetron injection 4 mg (has no administration in time range)   sodium chloride 0.9% flush 10 mL (has no administration in time range)   ranolazine 12 hr tablet 500 mg (has no administration in time range)   aspirin tablet 325 mg (325 mg Oral Given 10/30/24 1329)     Medical Decision Making  Haroldo Dickinson is a 48yo M w/ ESRD on HD MWF ,CAD (s/p HILLARY of RCA 2020) s/p [facility] Nov 2023 with three vessel disease deemed not a stent or surgical candidate, HTN, T2DM, CVA w/ residual R-sided deficits and RCC s/p bilateral nephrectomies who presented to the ED with chest pain. Nontoxic appearing. Hemodynamically stable. Afebrile. Exam as above. I will initiate workup and reassess.    Ddx: ACS, arrhythmia, hypertensive urgency, hypertensive emergency, CHF exacerbation, electrolyte deragement     Troponin up trended from baseline. EKG with stable findings. Patient denies chest pain currently. I discussed case with cardiology who recommend admission for PET.    BNP stable    CXR without infiltrate, effusion, pneumothorax, mass or other acute findings.    He only received half of dialysis today. No significant electrolyte derangements. No signs of volume overload     He was discharged yesterday after NSTEMI and hypertensive emergency, Will admit at this time for troponin trending and further cardiac workup.    Amount and/or Complexity of Data Reviewed  External Data Reviewed: radiology.     Details: ECHO from 10/28/24      Left Ventricle: The left ventricle is  normal in size. Increased ventricular mass. Normal wall thickness. There is eccentric hypertrophy. Regional wall motion abnormalities present. See diagram for wall motion findings. There is mildly reduced systolic function with a visually estimated ejection fraction of 40 - 45%. There is diastolic dysfunction.  ·  Right Ventricle: Normal right ventricular cavity size. Wall thickness is normal. Systolic function is normal.  ·  Left Atrium: Left atrium is severely dilated.  ·  Aortic Valve: There is moderate aortic valve sclerosis. There is annular calcification present. Cannot exclude bileaflet aortic valve. Mildly restricted motion.  ·  Mitral Valve: There is moderate posterior mitral annular calcification present. There is moderate to severe regurgitation.  ·  Tricuspid Valve: There is mild regurgitation.  ·  Pulmonary Artery: The estimated pulmonary artery systolic pressure is 61 mmHg.  ·  IVC/SVC: Normal venous pressure at 3 mmHg.  ·  Pericardium: There is a small posterior effusion.    Labs: ordered. Decision-making details documented in ED Course.  Radiology: ordered.    Risk  OTC drugs.               ED Course as of 10/30/24 1707   Wed Oct 30, 2024   1344 Troponin I(!): 0.754  Uptrend from BL of 0.120 [HM]   1344 Sodium(!): 133 [HM]   1345 Magnesium : 1.9 [HM]   1536 BNP(!): 400 [HM]   1536 WBC: 5.53 [HM]   1536 Hemoglobin(!): 10.4 [HM]   1536 Hematocrit(!): 31.2 [HM]      ED Course User Index  [HM] Laurie Bermudez PA-C                           Clinical Impression:  Final diagnoses:  [R07.9] Chest pain          ED Disposition Condition    Observation                 Laurie Bermudez PA-C  10/30/24 1707

## 2024-10-30 NOTE — H&P
"  [facility]  [facility]  History & Physical    Patient Name: Haroldo Dickinson  MRN: [id number]  Patient Class: OP- Observation  Admission Date: 10/30/2024  Attending Physician: Ondina Renner MD   Primary Care Provider: Mireya Larose MD         Patient information was obtained from patient, past medical records, and ER records.     Subjective:     Principal Problem:Chest pain    Chief Complaint:   Chief Complaint   Patient presents with    Chest Pain     Chest pain during dialysis session, only received 2 hours of treatment        HPI: Haroldo Dickinson is a 48yo M w/ ESRD on HD MWF ,CAD (s/p HILLARY of RCA 2020) s/p C Nov 2023 with three vessel disease deemed not a stent or surgical candidate, HTN, T2DM, CVA w/ residual R-sided deficits and RCC s/p bilateral nephrectomies who presented to the ED w/ CP. Admitted 10/28-10/29 w/ similar complaint. Per that course: Patient admitted to Hospital Medicine for evaluation of chest pain. Notable CAD hx w/ multiple presentations/admissions for chest pain and Type 2 NSTEMI/NSTEMI/elevated troponin. HILLARY placed to RCA in 2020. Most recently, [facility] (Nov 2023) noted 3VD. Apparently pt's anatomy is not amenable to revascularization, and has been advised per cardiology to manage medically. Troponin trend this admission flat. Continued on DAPT, statin, beta-blocker. Cardiology consulted. Suspect type 2 2/2 hypertensive urgency/emergency & volume overload; no utility for PET 2/2 C findings. Patient underwent HD session with fluid removal with improvement of chest pain. His antihypertensive regimen was also increased; specifically, minoxidil increased to 10 mg b.i.d. and per Cardiology recommendations, Ranexa added; given ESRD however, will initiate 500 daily versus b.i.d.   Today, Pt notes onset of mild chest pain radiating to his left arm during dialysis; 3/10, described as "sharp".  He notes that his blood pressure was low during dialysis and they were not able to pull " off as much volume as they intended.  He denies any diaphoresis, palpitations or nausea at the time.  At present, he is chest pain-free and feels 100%.  He notes he has yet to  Ranexa and did not take minoxidil this morning.  His son however is picking up Ranexa today.  [facility] medicine asked to admit for further evaluation.        Past Medical History:   Diagnosis Date    CAD (coronary artery disease), native coronary artery 11/21/2019    Cataract     Diabetes mellitus     Diabetic retinopathy     Dialysis patient     DM type 2 causing renal disease, not at goal     ESRD (end stage renal disease) started dialysis 01/2014 06/05/2014    History of colon polyps 02/10/2021    History of COVID-19 12/29/2022    Hyperparathyroidism, secondary renal 06/05/2014    Hypertension     NSTEMI (non-ST elevated myocardial infarction) 12/21/2013    Organ transplant candidate 06/05/2014    Palliative care encounter 10/29/2024    Pleural effusion 06/07/2024    Pneumonia     Post PTCA 08/26/2020    RCA HILLARY 7-25-20    Renal cell carcinoma of right kidney     Renal hypertension     Stroke        Past Surgical History:   Procedure Laterality Date    ANGIOGRAM, CORONARY, WITH LEFT HEART CATHETERIZATION N/A 7/1/2021    Procedure: Angiogram, Coronary, with Left Heart Cath;  Surgeon: Miki Zendejas MD;  Location: [facility] CATH LAB;  Service: Cardiology;  Laterality: N/A;    ANGIOGRAM, CORONARY, WITH LEFT HEART CATHETERIZATION N/A 11/28/2023    Procedure: Angiogram, Coronary, with Left Heart Cath;  Surgeon: Noah Pendleton MD;  Location: [facility] CATH LAB;  Service: Cardiology;  Laterality: N/A;    COLONOSCOPY N/A 5/3/2017    Procedure: COLONOSCOPY;  Surgeon: Rufino Carpenter MD;  Location: [facility] (4TH FLR);  Service: Endoscopy;  Laterality: N/A;   states can only schedule on Wednesdays    COLONOSCOPY N/A 2/9/2021    Procedure: COLONOSCOPY;  Surgeon: Edil Wong MD;  Location: [facility] ENDO (4TH FLR);  Service: Endoscopy;  Laterality:  N/A;  Dialysis MWF/ labwork day of procedure  right arm aceess  per Dr. Colin pt can hold Plavix 5 days prior see note- sm  COVID test on 2/6/21 at [facility] -     COLONOSCOPY N/A 2/10/2021    Procedure: COLONOSCOPY;  Surgeon: Robert Ayers MD;  Location: [facility] ENDO (4TH FLR);  Service: Endoscopy;  Laterality: N/A;  rescheduled due to poor bowel prep-BB  negative covid screening 2/6/21-BB  dialysis M-W-F-BB  okay to r/s for 2/9/21 and to hold Plavix per Dr. KIRAN Wong-BB  labs same day-BB    CORONARY STENT PLACEMENT N/A 7/25/2019    Procedure: INSERTION, STENT, CORONARY ARTERY;  Surgeon: Miki Zendejas MD;  Location: [facility] CATH LAB;  Service: Cardiology;  Laterality: N/A;    DIALYSIS FISTULA CREATION      FISTULOGRAM N/A 2/18/2019    Procedure: Fistulogram;  Surgeon: Yaneth De La Cruz MD;  Location: [facility] CATH LAB;  Service: Cardiology;  Laterality: N/A;    FISTULOGRAM Right 7/23/2019    Procedure: Fistulogram;  Surgeon: Oz Cordoba MD;  Location: [facility] CATH LAB;  Service: Cardiology;  Laterality: Right;    LAPAROSCOPIC ROBOT-ASSISTED SURGICAL REMOVAL OF KIDNEY USING DA JAIME XI Right 5/19/2022    Procedure: XI ROBOTIC NEPHRECTOMY;  Surgeon: Aidan Hendricks MD;  Location: [address];  Service: Urology;  Laterality: Right;  3hrs    LAPAROSCOPIC ROBOT-ASSISTED SURGICAL REMOVAL OF KIDNEY USING DA JAIME XI Left 1/5/2023    Procedure: XI ROBOTIC NEPHRECTOMY;  Surgeon: Aidan Hendricks MD;  [address];  Service: Urology;  Laterality: Left;  4 hrs    LEFT HEART CATHETERIZATION Left 7/25/2019    Procedure: Left heart cath;  Surgeon: Miki Zendejas MD;  Location: [facility] CATH LAB;  Service: Cardiology;  Laterality: Left;    REPAIR  1/5/2023    Procedure: REPAIR; COLOTOMY;  Surgeon: Maikel Lamb MD;  Location: [facility] OR 2ND FLR;  Service: General;;    RETINAL LASER PROCEDURE Bilateral 2018 or 2017    Dr. Rothman    VASCULAR SURGERY         Review of patient's allergies indicates:   Allergen  Reactions    No known allergies        Current Facility-Administered Medications on File Prior to Encounter   Medication    lidocaine (PF) 10 mg/ml (1%) injection 10 mg    [DISCONTINUED] acetaminophen tablet 650 mg    [DISCONTINUED] aspirin EC tablet 81 mg    [DISCONTINUED] atorvastatin tablet 80 mg    [DISCONTINUED] B-complex with vitamin C tablet 1 tablet    [DISCONTINUED] carvediloL tablet 50 mg    [DISCONTINUED] cinacalcet tablet 60 mg    [DISCONTINUED] cloNIDine tablet 0.3 mg    [DISCONTINUED] clopidogreL tablet 75 mg    [DISCONTINUED] dextrose 10% bolus 125 mL 125 mL    [DISCONTINUED] dextrose 10% bolus 125 mL 125 mL    [DISCONTINUED] dextrose 10% bolus 250 mL 250 mL    [DISCONTINUED] dextrose 10% bolus 250 mL 250 mL    [DISCONTINUED] ezetimibe tablet 10 mg    [DISCONTINUED] glucagon (human recombinant) injection 1 mg    [DISCONTINUED] glucose chewable tablet 16 g    [DISCONTINUED] glucose chewable tablet 24 g    [DISCONTINUED] heparin (porcine) injection 5,000 Units    [DISCONTINUED] hydrALAZINE tablet 100 mg    [DISCONTINUED] isosorbide mononitrate 24 hr tablet 120 mg    [DISCONTINUED] melatonin tablet 6 mg    [DISCONTINUED] minoxidiL tablet 10 mg    [DISCONTINUED] naloxone 0.4 mg/mL injection 0.02 mg    [DISCONTINUED] NIFEdipine 24 hr tablet 90 mg    [DISCONTINUED] nitroGLYCERIN SL tablet 0.4 mg    [DISCONTINUED] ondansetron injection 4 mg    [DISCONTINUED] polyethylene glycol packet 17 g    [DISCONTINUED] ranolazine 12 hr tablet 500 mg    [DISCONTINUED] sevelamer carbonate tablet 1,600 mg    [DISCONTINUED] sodium chloride 0.9% flush 10 mL     Current Outpatient Medications on File Prior to Encounter   Medication Sig    aspirin (ECOTRIN) 81 MG EC tablet Take 1 tablet (81 mg total) by mouth once daily.    atorvastatin (LIPITOR) 80 MG tablet Take 1 tablet (80 mg total) by mouth once daily.    carvediloL (COREG) 25 MG tablet Take 2 tablets (50 mg total) by mouth 2 (two) times daily with meals.    clopidogreL  (PLAVIX) 75 mg tablet Take 1 tablet (75 mg total) by mouth once daily.    epoetin philip (PROCRIT) 4,000 unit/mL injection Inject 1.93 mLs (7,720 Units total) into the skin every Mon, Wed, Fri.    ezetimibe (ZETIA) 10 mg tablet Take 1 tablet (10 mg total) by mouth once daily.    hydrALAZINE (APRESOLINE) 100 MG tablet Take 1 tablet (100 mg total) by mouth every 8 (eight) hours.    isosorbide mononitrate (IMDUR) 120 MG 24 hr tablet Take 1 tablet (120 mg total) by mouth once daily.    methoxy peg-epoetin beta (MIRCERA INJ) 150 mcg.    minoxidiL (LONITEN) 10 MG Tab Take 1 tablet (10 mg total) by mouth 2 (two) times daily.    NIFEdipine (PROCARDIA-XL) 90 MG (OSM) 24 hr tablet Take 1 tablet (90 mg total) by mouth once daily.    nitroGLYCERIN (NITROSTAT) 0.4 MG SL tablet Place 1 tablet (0.4 mg total) under the tongue every 5 (five) minutes as needed for Chest pain.    polyethylene glycol (GLYCOLAX) 17 gram/dose powder Take 17 g by mouth daily as needed for Constipation.    ranolazine (RANEXA) 500 MG Tb12 Take 1 tablet (500 mg total) by mouth Daily.    SENSIPAR 60 mg Tab Take 60 mg by mouth daily with breakfast.    sevelamer carbonate (RENVELA) 800 mg Tab Take 2 tablets (1,600 mg total) by mouth 3 (three) times daily with meals.    vit B complex-C-folic ac-zinc (RENAPLEX) 800 mcg- 12.5 mg Tab Take 1 tablet by mouth.    XPHOZAH 20 mg Tab Take 20 mg by mouth 2 (two) times a day.     Family History       Problem Relation (Age of Onset)    Cancer Maternal Grandfather    Cataracts Mother    Diabetes Mother    Heart disease Brother    Hypertension Mother, Father, Sister, Brother    Kidney disease Brother          Tobacco Use    Smoking status: Never    Smokeless tobacco: Never   Substance and Sexual Activity    Alcohol use: Not Currently     Comment: One drink a month    Drug use: No    Sexual activity: Yes     Partners: Female     Birth control/protection: None     Review of Systems   Constitutional:  Negative for chills,  diaphoresis and fever.   HENT:  Negative for congestion, sore throat and trouble swallowing.    Eyes:  Negative for visual disturbance.   Respiratory:  Negative for cough, shortness of breath and wheezing.    Cardiovascular:  Negative for chest pain, palpitations and leg swelling.   Gastrointestinal:  Negative for abdominal pain, diarrhea, nausea and vomiting.   Genitourinary:         Anuric   Musculoskeletal:  Negative for arthralgias.   Skin:  Negative for wound.   Neurological:  Negative for dizziness, light-headedness and headaches.   Psychiatric/Behavioral:  Negative for behavioral problems, confusion and decreased concentration.      Objective:     Vital Signs (Most Recent):  Temp: 97.9 °F (36.6 °C) (10/30/24 1243)  Pulse: 68 (10/30/24 1603)  Resp: 14 (10/30/24 1603)  BP: (!) 154/105 (10/30/24 1603)  SpO2: 100 % (10/30/24 1603) Vital Signs (24h Range):  Temp:  [97.8 °F (36.6 °C)-97.9 °F (36.6 °C)] 97.9 °F (36.6 °C)  Pulse:  [68-78] 68  Resp:  [14-19] 14  SpO2:  [97 %-100 %] 100 %  BP: (116-156)/() 154/105     Weight: 68 kg (149 lb 14.6 oz)  Body mass index is 23.48 kg/m².     Physical Exam  Vitals and nursing note reviewed.   Constitutional:       General: He is not in acute distress.     Appearance: He is not toxic-appearing or diaphoretic.   HENT:      Head: Normocephalic and atraumatic.      Right Ear: External ear normal.      Left Ear: External ear normal.      Nose: Nose normal.      Mouth/Throat:      Pharynx: Oropharynx is clear.   Eyes:      General: No scleral icterus.  Cardiovascular:      Rate and Rhythm: Normal rate and regular rhythm.      Pulses: Normal pulses.      Heart sounds: Normal heart sounds.   Pulmonary:      Effort: Pulmonary effort is normal.      Breath sounds: Normal breath sounds. No wheezing, rhonchi or rales.   Abdominal:      General: Bowel sounds are normal.      Palpations: Abdomen is soft.      Tenderness: There is no abdominal tenderness.   Musculoskeletal:          General: No tenderness.      Cervical back: Normal range of motion and neck supple.      Right lower leg: No edema.      Left lower leg: No edema.   Lymphadenopathy:      Cervical: No cervical adenopathy.   Skin:     General: Skin is warm and dry.      Capillary Refill: Capillary refill takes less than 2 seconds.      Comments: L UE w/ fistula, + thrill   Neurological:      Mental Status: He is alert and oriented to person, place, and time.   Psychiatric:         Behavior: Behavior normal.                Significant Labs: All pertinent labs within the past 24 hours have been reviewed.  Recent Results (from the past 24 hours)   EKG 12-lead    Collection Time: 10/30/24 12:48 PM   Result Value Ref Range    QRS Duration 112 ms    OHS QTC Calculation 477 ms   CBC auto differential    Collection Time: 10/30/24 12:53 PM   Result Value Ref Range    WBC 5.53 3.90 - 12.70 K/uL    RBC 3.33 (L) 4.60 - 6.20 M/uL    Hemoglobin 10.4 (L) 14.0 - 18.0 g/dL    Hematocrit 31.2 (L) 40.0 - 54.0 %    MCV 94 82 - 98 fL    MCH 31.2 (H) 27.0 - 31.0 pg    MCHC 33.3 32.0 - 36.0 g/dL    RDW 15.4 (H) 11.5 - 14.5 %    Platelets 218 150 - 450 K/uL    MPV 10.1 9.2 - 12.9 fL    Immature Granulocytes 0.4 0.0 - 0.5 %    Gran # (ANC) 3.3 1.8 - 7.7 K/uL    Immature Grans (Abs) 0.02 0.00 - 0.04 K/uL    Lymph # 1.3 1.0 - 4.8 K/uL    Mono # 0.6 0.3 - 1.0 K/uL    Eos # 0.3 0.0 - 0.5 K/uL    Baso # 0.06 0.00 - 0.20 K/uL    nRBC 0 0 /100 WBC    Gran % 60.2 38.0 - 73.0 %    Lymph % 22.6 18.0 - 48.0 %    Mono % 9.9 4.0 - 15.0 %    Eosinophil % 5.8 0.0 - 8.0 %    Basophil % 1.1 0.0 - 1.9 %    Differential Method Automated    Comprehensive metabolic panel    Collection Time: 10/30/24 12:53 PM   Result Value Ref Range    Sodium 133 (L) 136 - 145 mmol/L    Potassium 3.7 3.5 - 5.1 mmol/L    Chloride 101 95 - 110 mmol/L    CO2 22 (L) 23 - 29 mmol/L    Glucose 217 (H) 70 - 110 mg/dL    BUN 38 (H) 6 - 20 mg/dL    Creatinine 7.8 (H) 0.5 - 1.4 mg/dL    Calcium 8.6 (L)  8.7 - 10.5 mg/dL    Total Protein 7.0 6.0 - 8.4 g/dL    Albumin 3.1 (L) 3.5 - 5.2 g/dL    Total Bilirubin 0.6 0.1 - 1.0 mg/dL    Alkaline Phosphatase 59 40 - 150 U/L    AST 15 10 - 40 U/L    ALT 9 (L) 10 - 44 U/L    eGFR 7.9 (A) >60 mL/min/1.73 m^2    Anion Gap 10 8 - 16 mmol/L   Troponin I #1    Collection Time: 10/30/24 12:53 PM   Result Value Ref Range    Troponin I 0.754 (H) 0.000 - 0.026 ng/mL   B-Type natriuretic peptide (BNP)    Collection Time: 10/30/24 12:53 PM   Result Value Ref Range     (H) 0 - 99 pg/mL   Magnesium    Collection Time: 10/30/24 12:53 PM   Result Value Ref Range    Magnesium 1.9 1.6 - 2.6 mg/dL         Significant Imaging: I have reviewed all pertinent imaging results/findings within the past 24 hours.  Imaging Results              X-Ray Chest AP Portable (Final result)  Result time 10/30/24 14:02:16      Final result by Edil Tidwell MD (10/30/24 14:02:16)                   Impression:      No significant detrimental interval change in the appearance of the chest since 10/28/2024 is appreciated.      Electronically signed by: Edil Tidwell MD  Date:    10/30/2024  Time:    14:02               Narrative:    EXAMINATION:  XR CHEST AP PORTABLE    CLINICAL HISTORY:  Chest Pain;    TECHNIQUE:  One view    COMPARISON:  Comparison is made to 10/28/2024.    FINDINGS:  Heart size and the appearance of the cardiomediastinal silhouette and pulmonary vascularity demonstrate no detrimental change since the recent prior examination referenced above.  Lung zones are stable as well, with no evidence of superimposed airspace consolidation or volume loss observed.  No pleural fluid.  No abnormal mediastinal widening.  No pneumothorax.                                      Assessment/Plan:     * Chest pain  Triple-vessel Coronary artery disease  Hypertensive emergency  Type II NSTEMI (demand ischemia)      Notable CAD hx w/ multiple presentations/admissions for chest pain and Type 2  NSTEMI/NSTEMI/elevated troponin. HILLARY placed to RCA in 2020. Most recently, [facility] (Nov 2023) noted 3VD. Apparently pt's anatomy is not amenable to revascularization, and has been advised per cardiology to manage medically. Presented w/ L-sided chest pain and troponin elevation. EKG w/ nonspecific ST and T-wave abnormalities.     - F/u repeat trop  - Cardiology consulted: known TVD; cardiology team to discuss further to determine if feasible to attempt further catheterization - cardiac PET  - Continue home DAPT & statin  - Continue home BB  - BP mgmt as per HTN; optimize  -continue to monitor on tele  - SL nitro prn    Type 2 diabetes mellitus with chronic kidney disease on chronic dialysis, without long-term current use of insulin  Creatine stable for now. BMP reviewed- noted Estimated Creatinine Clearance: 10.9 mL/min (A) (based on SCr of 7.8 mg/dL (H)). according to latest data. Based on current GFR, CKD stage is end stage.  Monitor UOP and serial BMP and adjust therapy as needed. Renally dose meds. Avoid nephrotoxic medications and procedures.    Anemia in end-stage renal disease  Anemia is likely due to  esrd . Most recent hemoglobin and hematocrit are listed below.  Recent Labs     10/28/24  0330 10/29/24  0258 10/30/24  1253   HGB 10.0* 9.9* 10.4*   HCT 30.2* 31.2* 31.2*     Plan  - Monitor serial CBC: Daily  - Transfuse PRBC if patient becomes hemodynamically unstable, symptomatic or H/H drops below 7/21.  - Patient has not received any PRBC transfusions to date  - Patient's anemia is currently stable    S/p nephrectomy  Hx of RCC  S/p bilateral nephrectomies   Hx of RCC- first of R kidney in 2022 (s/p R nephrectomy May 2022), then of L kidney in 2023 (s/p L nephrectomy in 2023).   - HD as above       Essential hypertension  Patients blood pressure range in the last 24 hours was: BP  Min: 104/54  Max: 225/105.The patient's inpatient anti-hypertensive regimen is listed below:  Current  Antihypertensives  carvediloL tablet 50 mg, 2 times daily with meals, Oral  hydrALAZINE tablet 100 mg, Every 8 hours, Oral  isosorbide mononitrate 24 hr tablet 120 mg, Daily, Oral  minoxidiL tablet 10 mg, 2 times daily, Oral  NIFEdipine 24 hr tablet 90 mg, Daily, Oral  ranolazine 12 hr tablet 500 mg, 2 times daily, Oral  cloNIDine tablet 0.3 mg, Every 12 hours PRN, Oral  nitroGLYCERIN SL tablet 0.4 mg, Every 5 min PRN, Sublingual    Plan  - BP is controlled, no changes needed to their regimen  Hx of chronic poorly-controlled HTN (w/ complications of CVA & ESRD), managed w/ home regimen of minoxidil, carvedilol, nifedipine, Imdur & hydralazine (and volume mgmt w/ HD). Pt reports medication & HD compliance. Hypertensive on arrival. Suspect uncontrolled HTN contributing to presentation.  Can consider increasing minoxidil if remains uncontrolled.    Hold BP meds prior to HD on HD days in administer after HD to prevent hypotension and less than optimal volume removal.        ESRD on hemodialysis  Creatine stable for now. BMP reviewed- noted Estimated Creatinine Clearance: 10.9 mL/min (A) (based on SCr of 7.8 mg/dL (H)). according to latest data. Based on current GFR, CKD stage is end stage.  Monitor UOP and serial BMP and adjust therapy as needed. Renally dose meds. Avoid nephrotoxic medications and procedures.    -Pt undergoes HD MWF-Nephology consulted: further decision for HD per nephology  -Renally dose medications and avoid nephrotoxic medications to preserve residual renal function   -Strict I/O's and daily weights  -Renal diet   -Continue to monitor renal function with daily labs    Hemiparesis affecting right side as late effect of cerebrovascular accident  Hx of CVA  R-sided weakness  Remote CVA hx (2013?) re: uncontrolled HTN w/ residual R-sided weakness. No acute issues/concerns on current admission.   - Continue ASA & statin  - BP control as per HTN        VTE Risk Mitigation (From admission, onward)            Ordered     heparin (porcine) injection 5,000 Units  Every 8 hours         10/30/24 1613     Place sequential compression device  Until discontinued         10/30/24 1613                       On 10/30/2024, patient should be placed in hospital observation services under my care.             Ondina Renner MD  Department of Hospital Medicine  [facility] Dept

## 2024-10-30 NOTE — ASSESSMENT & PLAN NOTE
Creatine stable for now. BMP reviewed- noted Estimated Creatinine Clearance: 10.9 mL/min (A) (based on SCr of 7.8 mg/dL (H)). according to latest data. Based on current GFR, CKD stage is end stage.  Monitor UOP and serial BMP and adjust therapy as needed. Renally dose meds. Avoid nephrotoxic medications and procedures.    -Pt undergoes HD MWF-Nephology consulted: further decision for HD per nephology  -Renally dose medications and avoid nephrotoxic medications to preserve residual renal function   -Strict I/O's and daily weights  -Renal diet   -Continue to monitor renal function with daily labs

## 2024-10-30 NOTE — ASSESSMENT & PLAN NOTE
Creatine stable for now. BMP reviewed- noted Estimated Creatinine Clearance: 10.9 mL/min (A) (based on SCr of 7.8 mg/dL (H)). according to latest data. Based on current GFR, CKD stage is end stage.  Monitor UOP and serial BMP and adjust therapy as needed. Renally dose meds. Avoid nephrotoxic medications and procedures.

## 2024-10-30 NOTE — ED NOTES
Requested tele box from floor. Charge nurse has not looked at patient yet so floor will not take report.

## 2024-10-30 NOTE — ASSESSMENT & PLAN NOTE
Anemia is likely due to  esrd . Most recent hemoglobin and hematocrit are listed below.  Recent Labs     10/28/24  0330 10/29/24  0258 10/30/24  1253   HGB 10.0* 9.9* 10.4*   HCT 30.2* 31.2* 31.2*     Plan  - Monitor serial CBC: Daily  - Transfuse PRBC if patient becomes hemodynamically unstable, symptomatic or H/H drops below 7/21.  - Patient has not received any PRBC transfusions to date  - Patient's anemia is currently stable

## 2024-10-30 NOTE — HPI
"Haroldo Dickinson is a 48yo M w/ ESRD on HD MWF ,CAD (s/p HILLARY of RCA 2020) s/p C Nov 2023 with three vessel disease deemed not a stent or surgical candidate, HTN, T2DM, CVA w/ residual R-sided deficits and RCC s/p bilateral nephrectomies who presented to the ED w/ CP. Admitted 10/28-10/29 w/ similar complaint. Per that course: Patient admitted to Hospital Medicine for evaluation of chest pain. Notable CAD hx w/ multiple presentations/admissions for chest pain and Type 2 NSTEMI/NSTEMI/elevated troponin. HILLARY placed to RCA in 2020. Most recently, [facility] (Nov 2023) noted 3VD. Apparently pt's anatomy is not amenable to revascularization, and has been advised per cardiology to manage medically. Troponin trend this admission flat. Continued on DAPT, statin, beta-blocker. Cardiology consulted. Suspect type 2 2/2 hypertensive urgency/emergency & volume overload; no utility for PET 2/2 C findings. Patient underwent HD session with fluid removal with improvement of chest pain. His antihypertensive regimen was also increased; specifically, minoxidil increased to 10 mg b.i.d. and per Cardiology recommendations, Ranexa added; given ESRD however, will initiate 500 daily versus b.i.d.   Today, Pt notes onset of mild chest pain radiating to his left arm during dialysis; 3/10, described as "sharp".  He notes that his blood pressure was low during dialysis and they were not able to pull off as much volume as they intended.  He denies any diaphoresis, palpitations or nausea at the time.  At present, he is chest pain-free and feels 100%.  He notes he has yet to  Ranexa and did not take minoxidil this morning.  His son however is picking up Ranexa today.  Hospital medicine asked to admit for further evaluation.      "

## 2024-10-30 NOTE — HPI
Haroldo Dickinson is a 48yo M w/ ESRD on HD, CAD with complex mv stenosis, HTN, T2DM, CVA w/ residual R-sided deficits and RCC s/p bilateral nephrectomies who presented to the ED w/ CP. Pt reports acute onset CP located left side on his chest while he was getting HD. CP described as sharp and severe and with radiation to his L arm and lasted x 60 min. It is similar to the CP he has had during multiple hospital admissions this year. He came to the ED for this. Upon my encounter, he is now chest pain free. VSS. EKG shows no new changes, does show baseline LVH with secondary repol. Initial trop 0.7. importantly, his BP is still high at 154/106 at the time of my encounter. During HD session today, 2.0 L was removed. Plan made to bring patient in for observation and PET scan tomorrow.

## 2024-10-30 NOTE — CONSULTS
[facility] - Emergency Dept  Cardiology  Consult Note    Patient Name: Haroldo Dickinson  MRN: [id number]  Admission Date: 10/30/2024  Hospital Length of Stay: 0 days  Code Status: Full Code   Attending Provider: Ondina Renner MD   Consulting Provider: Mike Bush MD  Primary Care Physician: Mireya Larose MD  Principal Problem:Chest pain    Patient information was obtained from patient, past medical records, and ER records.     Inpatient consult to Cardiology  Consult performed by: Mike Bush MD  Consult ordered by: Ondina Renner MD        Subjective:     Chief Complaint:  CP     HPI:   Haroldo Dickinson is a 50yo M w/ ESRD on HD, CAD with complex mv stenosis, HTN, T2DM, CVA w/ residual R-sided deficits and RCC s/p bilateral nephrectomies who presented to the ED w/ CP. Pt reports acute onset CP located left side on his chest while he was getting HD. CP described as sharp and severe and with radiation to his L arm and lasted x 60 min. It is similar to the CP he has had during multiple hospital admissions this year. He came to the ED for this. Upon my encounter, he is now chest pain free. VSS. EKG shows no new changes, does show baseline LVH with secondary repol. Initial trop 0.7. importantly, his BP is still high at 154/106 at the time of my encounter. During HD session today, 2.0 L was removed. Plan made to bring patient in for observation and PET scan tomorrow.    Past Medical History:   Diagnosis Date    CAD (coronary artery disease), native coronary artery 11/21/2019    Cataract     Diabetes mellitus     Diabetic retinopathy     Dialysis patient     DM type 2 causing renal disease, not at goal     ESRD (end stage renal disease) started dialysis 01/2014 06/05/2014    History of colon polyps 02/10/2021    History of COVID-19 12/29/2022    Hyperparathyroidism, secondary renal 06/05/2014    Hypertension     NSTEMI (non-ST elevated myocardial infarction) 12/21/2013    Organ transplant candidate 06/05/2014    Palliative care  encounter 10/29/2024    Pleural effusion 06/07/2024    Pneumonia     Post PTCA 08/26/2020    RCA [facility] 7-25-20    Renal cell carcinoma of right kidney     Renal hypertension     Stroke        Past Surgical History:   Procedure Laterality Date    ANGIOGRAM, CORONARY, WITH LEFT HEART CATHETERIZATION N/A 7/1/2021    Procedure: Angiogram, Coronary, with Left Heart Cath;  Surgeon: Miki Zendejas MD;  Location: [facility] CATH LAB;  Service: Cardiology;  Laterality: N/A;    ANGIOGRAM, CORONARY, WITH LEFT HEART CATHETERIZATION N/A 11/28/2023    Procedure: Angiogram, Coronary, with Left Heart Cath;  Surgeon: Noah Pendleton MD;  Location: [facility] CATH LAB;  Service: Cardiology;  Laterality: N/A;    COLONOSCOPY N/A 5/3/2017    Procedure: COLONOSCOPY;  Surgeon: Rufino Carpenter MD;  Location: [facility] ([address] [place] [address]);  Service: Endoscopy;  Laterality: N/A;  pt states can only schedule on Wednesdays    COLONOSCOPY N/A 2/9/2021    Procedure: COLONOSCOPY;  Surgeon: Edil Wong MD;  Location: [facility] ([address] [place] [address]);  Service: Endoscopy;  Laterality: N/A;  Dialysis MWF/ labwork day of procedure  right arm aceess  per Dr. Colin pt can hold Plavix 5 days prior see note- sm  COVID test on 2/6/21 at [facility] - sm    COLONOSCOPY N/A 2/10/2021    Procedure: COLONOSCOPY;  Surgeon: Robert Ayers MD;  Location: [facility] ([address] [place] [address]);  Service: Endoscopy;  Laterality: N/A;  rescheduled due to poor bowel prep-BB  negative covid screening 2/6/21-BB  dialysis M-W-F-BB  okay to r/s for 2/9/21 and to hold Plavix per Dr. KIRAN Wong-BB  labs same day-BB    CORONARY STENT PLACEMENT N/A 7/25/2019    Procedure: INSERTION, STENT, CORONARY ARTERY;  Surgeon: Miki Zendejas MD;  Location: [facility] CATH LAB;  Service: Cardiology;  Laterality: N/A;    DIALYSIS FISTULA CREATION      FISTULOGRAM N/A 2/18/2019    Procedure: Fistulogram;  Surgeon: Yaneth De La Cruz MD;  Location: [facility] CATH LAB;  Service: Cardiology;  Laterality: N/A;    FISTULOGRAM Right 7/23/2019    Procedure:  Fistulogram;  Surgeon: Oz Cordoba MD;  Location: [facility] CATH LAB;  Service: Cardiology;  Laterality: Right;    LAPAROSCOPIC ROBOT-ASSISTED SURGICAL REMOVAL OF KIDNEY USING DA JAIME XI Right 5/19/2022    Procedure: XI ROBOTIC NEPHRECTOMY;  Surgeon: Aidan Hendricks MD;  Location: [facility] OR [facility] FLR;  Service: Urology;  Laterality: Right;  3hrs    LAPAROSCOPIC ROBOT-ASSISTED SURGICAL REMOVAL OF KIDNEY USING DA JAIME XI Left 1/5/2023    Procedure: XI ROBOTIC NEPHRECTOMY;  Surgeon: Aidan Hendricks MD;  Location: [facility] OR [facility] FLR;  Service: Urology;  Laterality: Left;  4 hrs    LEFT HEART CATHETERIZATION Left 7/25/2019    Procedure: Left heart cath;  Surgeon: Miki Zendejas MD;  Location: [facility] CATH LAB;  Service: Cardiology;  Laterality: Left;    REPAIR  1/5/2023    Procedure: REPAIR; COLOTOMY;  Surgeon: Maikel Lamb MD;  Location: [facility] OR [address] [place] [address];  Service: General;;    RETINAL LASER PROCEDURE Bilateral 2018 or 2017    Dr. Rothman    VASCULAR SURGERY         Review of patient's allergies indicates:   Allergen Reactions    No known allergies        Current Facility-Administered Medications on File Prior to Encounter   Medication    lidocaine (PF) 10 mg/ml (1%) injection 10 mg    [DISCONTINUED] acetaminophen tablet 650 mg    [DISCONTINUED] aspirin EC tablet 81 mg    [DISCONTINUED] atorvastatin tablet 80 mg    [DISCONTINUED] B-complex with vitamin C tablet 1 tablet    [DISCONTINUED] carvediloL tablet 50 mg    [DISCONTINUED] cinacalcet tablet 60 mg    [DISCONTINUED] cloNIDine tablet 0.3 mg    [DISCONTINUED] clopidogreL tablet 75 mg    [DISCONTINUED] dextrose 10% bolus 125 mL 125 mL    [DISCONTINUED] dextrose 10% bolus 125 mL 125 mL    [DISCONTINUED] dextrose 10% bolus 250 mL 250 mL    [DISCONTINUED] dextrose 10% bolus 250 mL 250 mL    [DISCONTINUED] ezetimibe tablet 10 mg    [DISCONTINUED] glucagon (human recombinant) injection 1 mg    [DISCONTINUED] glucose chewable tablet 16 g    [DISCONTINUED] glucose  chewable tablet 24 g    [DISCONTINUED] heparin (porcine) injection 5,000 Units    [DISCONTINUED] hydrALAZINE tablet 100 mg    [DISCONTINUED] isosorbide mononitrate 24 hr tablet 120 mg    [DISCONTINUED] melatonin tablet 6 mg    [DISCONTINUED] minoxidiL tablet 10 mg    [DISCONTINUED] naloxone 0.4 mg/mL injection 0.02 mg    [DISCONTINUED] NIFEdipine 24 hr tablet 90 mg    [DISCONTINUED] nitroGLYCERIN SL tablet 0.4 mg    [DISCONTINUED] ondansetron injection 4 mg    [DISCONTINUED] polyethylene glycol packet 17 g    [DISCONTINUED] ranolazine 12 hr tablet 500 mg    [DISCONTINUED] sevelamer carbonate tablet 1,600 mg    [DISCONTINUED] sodium chloride 0.9% flush 10 mL     Current Outpatient Medications on File Prior to Encounter   Medication Sig    aspirin (ECOTRIN) 81 MG EC tablet Take 1 tablet (81 mg total) by mouth once daily.    atorvastatin (LIPITOR) 80 MG tablet Take 1 tablet (80 mg total) by mouth once daily.    carvediloL (COREG) 25 MG tablet Take 2 tablets (50 mg total) by mouth 2 (two) times daily with meals.    clopidogreL (PLAVIX) 75 mg tablet Take 1 tablet (75 mg total) by mouth once daily.    epoetin philip (PROCRIT) 4,000 unit/mL injection Inject 1.93 mLs (7,720 Units total) into the skin every Mon, Wed, Fri.    ezetimibe (ZETIA) 10 mg tablet Take 1 tablet (10 mg total) by mouth once daily.    hydrALAZINE (APRESOLINE) 100 MG tablet Take 1 tablet (100 mg total) by mouth every 8 (eight) hours.    isosorbide mononitrate (IMDUR) 120 MG 24 hr tablet Take 1 tablet (120 mg total) by mouth once daily.    methoxy peg-epoetin beta (MIRCERA INJ) 150 mcg.    minoxidiL (LONITEN) 10 MG Tab Take 1 tablet (10 mg total) by mouth 2 (two) times daily.    NIFEdipine (PROCARDIA-XL) 90 MG (OSM) 24 hr tablet Take 1 tablet (90 mg total) by mouth once daily.    nitroGLYCERIN (NITROSTAT) 0.4 MG SL tablet Place 1 tablet (0.4 mg total) under the tongue every 5 (five) minutes as needed for Chest pain.    polyethylene glycol (GLYCOLAX) 17  gram/dose powder Take 17 g by mouth daily as needed for Constipation.    ranolazine (RANEXA) 500 MG Tb12 Take 1 tablet (500 mg total) by mouth Daily.    SENSIPAR 60 mg Tab Take 60 mg by mouth daily with breakfast.    sevelamer carbonate (RENVELA) 800 mg Tab Take 2 tablets (1,600 mg total) by mouth 3 (three) times daily with meals.    vit B complex-C-folic ac-zinc (RENAPLEX) 800 mcg- 12.5 mg Tab Take 1 tablet by mouth.    XPHOZAH 20 mg Tab Take 20 mg by mouth 2 (two) times a day.     Family History       Problem Relation (Age of Onset)    Cancer Maternal Grandfather    Cataracts Mother    Diabetes Mother    Heart disease Brother    Hypertension Mother, Father, Sister, Brother    Kidney disease Brother          Tobacco Use    Smoking status: Never    Smokeless tobacco: Never   Substance and Sexual Activity    Alcohol use: Not Currently     Comment: One drink a month    Drug use: No    Sexual activity: Yes     Partners: Female     Birth control/protection: None     Review of Systems   Cardiovascular:  Positive for chest pain. Negative for dyspnea on exertion, irregular heartbeat, near-syncope, palpitations and syncope.   Respiratory:  Negative for shortness of breath.      Objective:     Vital Signs (Most Recent):  Temp: 97.9 °F (36.6 °C) (10/30/24 1243)  Pulse: 68 (10/30/24 1603)  Resp: 14 (10/30/24 1603)  BP: (!) 154/105 (10/30/24 1603)  SpO2: 100 % (10/30/24 1603) Vital Signs (24h Range):  Temp:  [97.8 °F (36.6 °C)-97.9 °F (36.6 °C)] 97.9 °F (36.6 °C)  Pulse:  [68-78] 68  Resp:  [14-19] 14  SpO2:  [97 %-100 %] 100 %  BP: (116-156)/() 154/105     Weight: 68 kg (149 lb 14.6 oz)  Body mass index is 23.48 kg/m².    SpO2: 100 %       No intake or output data in the 24 hours ending 10/30/24 1647    Lines/Drains/Airways       Peripheral Intravenous Line  Duration                  Hemodialysis AV Fistula Right forearm -- days         Peripheral IV - Single Lumen 10/30/24 1246 20 G Left Antecubital <1 day                      Physical Exam  Vitals reviewed.   Constitutional:       General: He is not in acute distress.     Appearance: He is not ill-appearing or diaphoretic.   Eyes:      General: No scleral icterus.     Extraocular Movements: Extraocular movements intact.   Cardiovascular:      Rate and Rhythm: Normal rate and regular rhythm.      Pulses: Normal pulses.      Heart sounds: No murmur heard.     No friction rub. No gallop.   Pulmonary:      Effort: Pulmonary effort is normal. No respiratory distress.      Breath sounds: Normal breath sounds. No wheezing or rales.   Abdominal:      General: There is no distension.      Palpations: Abdomen is soft.   Musculoskeletal:      Right lower leg: No edema.      Left lower leg: No edema.   Skin:     General: Skin is warm and dry.   Neurological:      Mental Status: He is alert and oriented to person, place, and time.          Significant Labs: CMP   Recent Labs   Lab 10/29/24  0258 10/30/24  1253   * 133*   K 4.4 3.7   CL 98 101   CO2 23 22*   GLU 88 217*   BUN 55* 38*   CREATININE 9.2* 7.8*   CALCIUM 8.6* 8.6*   PROT 7.1 7.0   ALBUMIN 3.1* 3.1*   BILITOT 0.6 0.6   ALKPHOS 60 59   AST 13 15   ALT 10 9*   ANIONGAP 14 10   , CBC   Recent Labs   Lab 10/29/24  0258 10/30/24  1253   WBC 6.95 5.53   HGB 9.9* 10.4*   HCT 31.2* 31.2*    218   , and Troponin   Recent Labs   Lab 10/30/24  1253   TROPONINI 0.754*       Significant Imaging: EKG: Unchanged from previous.  Assessment and Plan:     * Chest pain   Pt reports acute onset CP located left side on his chest while he was getting HD. CP described as sharp and severe and with radiation to his L arm and lasted x 60 min. Of note, he was recently discharged on 10/28 for a similar presentation.    Recommendations:  - PET stress in AM  - NPO after MN  - No caffeine until after stress test        VTE Risk Mitigation (From admission, onward)           Ordered     heparin (porcine) injection 5,000 Units  Every 8 hours          10/30/24 1613     Place sequential compression device  Until discontinued         10/30/24 1613                    Thank you for your consult. I will follow-up with patient. Please contact us if you have any additional questions.    Mike Bush MD  Cardiology   [facility] Dept

## 2024-10-30 NOTE — ED NOTES
"Pt presents to ED via EMS from dialysis clinic w/ c/o chest pain. Pt reports about ~2 hours into his dialysis session he began having left, anterior chest pain. At this time, pt rates pain 3/10, described as "sharp" and radiating down his left arm. Denies shortness of breath. Reports cardiac hx. States he was recently admitted for similar symptoms w/ negative workup. Denies headache, abdominal pain, fever, chills, dysuria, or any other sxs at this time. Received 81 ASA w/ EMS.    Patient identifiers for Haroldo Dickinson 49 y.o. male checked and correct.  Chief Complaint   Patient presents with    Chest Pain     Chest pain during dialysis session, only received 2 hours of treatment     Past Medical History:   Diagnosis Date    CAD (coronary artery disease), native coronary artery 11/21/2019    Cataract     Diabetes mellitus     Diabetic retinopathy     Dialysis patient     DM type 2 causing renal disease, not at goal     ESRD (end stage renal disease) started dialysis 01/2014 06/05/2014    History of colon polyps 02/10/2021    History of COVID-19 12/29/2022    Hyperparathyroidism, secondary renal 06/05/2014    Hypertension     NSTEMI (non-ST elevated myocardial infarction) 12/21/2013    Organ transplant candidate 06/05/2014    Palliative care encounter 10/29/2024    Pleural effusion 06/07/2024    Pneumonia     Post PTCA 08/26/2020    RCA [facility] 7-25-20    Renal cell carcinoma of right kidney     Renal hypertension     Stroke      "

## 2024-10-30 NOTE — ASSESSMENT & PLAN NOTE
Pt reports acute onset CP located left side on his chest while he was getting HD. CP described as sharp and severe and with radiation to his L arm and lasted x 60 min. Of note, he was recently discharged on 10/28 for a similar presentation.    Recommendations:  - PET stress in AM  - NPO after MN  - No caffeine until after stress test

## 2024-10-31 PROBLEM — R07.9 CHEST PAIN: Chronic | Status: RESOLVED | Noted: 2024-10-28 | Resolved: 2024-10-31

## 2024-10-31 LAB
ALBUMIN SERPL BCP-MCNC: 3.2 G/DL (ref 3.5–5.2)
ALP SERPL-CCNC: 67 U/L (ref 40–150)
ALT SERPL W/O P-5'-P-CCNC: 11 U/L (ref 10–44)
ANION GAP SERPL CALC-SCNC: 13 MMOL/L (ref 8–16)
APTT PPP: 40 SEC (ref 21–32)
APTT PPP: 63.1 SEC (ref 21–32)
APTT PPP: 63.1 SEC (ref 21–32)
AST SERPL-CCNC: 17 U/L (ref 10–40)
BASOPHILS # BLD AUTO: 0.08 K/UL (ref 0–0.2)
BASOPHILS # BLD AUTO: 0.08 K/UL (ref 0–0.2)
BASOPHILS NFR BLD: 1.3 % (ref 0–1.9)
BASOPHILS NFR BLD: 1.5 % (ref 0–1.9)
BILIRUB SERPL-MCNC: 0.5 MG/DL (ref 0.1–1)
BUN SERPL-MCNC: 52 MG/DL (ref 6–20)
CALCIUM SERPL-MCNC: 9.3 MG/DL (ref 8.7–10.5)
CFR FLOW - ANTERIOR: 0.95
CFR FLOW - INFERIOR: 1.01
CFR FLOW - LATERAL: 0.93
CFR FLOW - MAX: 1.41
CFR FLOW - MIN: 0.67
CFR FLOW - SEPTAL: 0.86
CFR FLOW - WHOLE HEART: 0.94
CFR FLOW- DEFECT 1: 1.06
CFR FLOW- DEFECT 2: 1.05
CHLORIDE SERPL-SCNC: 100 MMOL/L (ref 95–110)
CO2 SERPL-SCNC: 20 MMOL/L (ref 23–29)
CREAT SERPL-MCNC: 10 MG/DL (ref 0.5–1.4)
CV STRESS BASE HR: 81 BPM
DIASTOLIC BLOOD PRESSURE: 52 MMHG
DIFFERENTIAL METHOD BLD: ABNORMAL
DIFFERENTIAL METHOD BLD: ABNORMAL
EJECTION FRACTION- HIGH: 59 %
END DIASTOLIC INDEX-HIGH: 155 ML/M2
END DIASTOLIC INDEX-LOW: 91 ML/M2
END SYSTOLIC INDEX-HIGH: 78 ML/M2
END SYSTOLIC INDEX-LOW: 40 ML/M2
EOSINOPHIL # BLD AUTO: 0.4 K/UL (ref 0–0.5)
EOSINOPHIL # BLD AUTO: 0.5 K/UL (ref 0–0.5)
EOSINOPHIL NFR BLD: 6.5 % (ref 0–8)
EOSINOPHIL NFR BLD: 8.2 % (ref 0–8)
ERYTHROCYTE [DISTWIDTH] IN BLOOD BY AUTOMATED COUNT: 15.1 % (ref 11.5–14.5)
ERYTHROCYTE [DISTWIDTH] IN BLOOD BY AUTOMATED COUNT: 15.2 % (ref 11.5–14.5)
EST. GFR  (NO RACE VARIABLE): 5.9 ML/MIN/1.73 M^2
GLUCOSE SERPL-MCNC: 136 MG/DL (ref 70–110)
HCT VFR BLD AUTO: 33.3 % (ref 40–54)
HCT VFR BLD AUTO: 35.5 % (ref 40–54)
HGB BLD-MCNC: 10.5 G/DL (ref 14–18)
HGB BLD-MCNC: 11.1 G/DL (ref 14–18)
IMM GRANULOCYTES # BLD AUTO: 0.02 K/UL (ref 0–0.04)
IMM GRANULOCYTES # BLD AUTO: 0.02 K/UL (ref 0–0.04)
IMM GRANULOCYTES NFR BLD AUTO: 0.3 % (ref 0–0.5)
IMM GRANULOCYTES NFR BLD AUTO: 0.4 % (ref 0–0.5)
LYMPHOCYTES # BLD AUTO: 1.1 K/UL (ref 1–4.8)
LYMPHOCYTES # BLD AUTO: 1.2 K/UL (ref 1–4.8)
LYMPHOCYTES NFR BLD: 17.2 % (ref 18–48)
LYMPHOCYTES NFR BLD: 22.2 % (ref 18–48)
MAGNESIUM SERPL-MCNC: 2.1 MG/DL (ref 1.6–2.6)
MCH RBC QN AUTO: 30 PG (ref 27–31)
MCH RBC QN AUTO: 30.3 PG (ref 27–31)
MCHC RBC AUTO-ENTMCNC: 31.3 G/DL (ref 32–36)
MCHC RBC AUTO-ENTMCNC: 31.5 G/DL (ref 32–36)
MCV RBC AUTO: 96 FL (ref 82–98)
MCV RBC AUTO: 96 FL (ref 82–98)
MONOCYTES # BLD AUTO: 0.8 K/UL (ref 0.3–1)
MONOCYTES # BLD AUTO: 0.8 K/UL (ref 0.3–1)
MONOCYTES NFR BLD: 12.4 % (ref 4–15)
MONOCYTES NFR BLD: 14.2 % (ref 4–15)
NEUTROPHILS # BLD AUTO: 2.9 K/UL (ref 1.8–7.7)
NEUTROPHILS # BLD AUTO: 3.9 K/UL (ref 1.8–7.7)
NEUTROPHILS NFR BLD: 53.5 % (ref 38–73)
NEUTROPHILS NFR BLD: 62.3 % (ref 38–73)
NRBC BLD-RTO: 0 /100 WBC
NRBC BLD-RTO: 0 /100 WBC
NUC REST DIASTOLIC VOLUME INDEX: 190
NUC REST EJECTION FRACTION: 38
NUC REST SYSTOLIC VOLUME INDEX: 117
NUC STRESS DIASTOLIC VOLUME INDEX: 200
NUC STRESS EJECTION FRACTION: 36 %
NUC STRESS SYSTOLIC VOLUME INDEX: 128
OHS CV CPX 1 MINUTE RECOVERY HEART RATE: 94 BPM
OHS CV CPX 85 PERCENT MAX PREDICTED HEART RATE MALE: 147
OHS CV CPX MAX PREDICTED HEART RATE: 173
OHS CV CPX PATIENT IS FEMALE: 0
OHS CV CPX PATIENT IS MALE: 1
OHS CV CPX PEAK DIASTOLIC BLOOD PRESSURE: 82 MMHG
OHS CV CPX PEAK HEAR RATE: 77 BPM
OHS CV CPX PEAK RATE PRESSURE PRODUCT: 8701
OHS CV CPX PEAK SYSTOLIC BLOOD PRESSURE: 113 MMHG
OHS CV CPX PERCENT MAX PREDICTED HEART RATE ACHIEVED: 45
OHS CV CPX RATE PRESSURE PRODUCT PRESENTING: NORMAL
OHS CV INITIAL DOSE: 22.1 MCG/KG/MIN
OHS CV PEAK DOSE: 22.2 MCG/KG/MIN
OHS CV PET ID: 7523
OHS CV TOTAL EXAM DLP: 300.85 MGY-CM
OHS QRS DURATION: 106 MS
OHS QRS DURATION: 94 MS
OHS QTC CALCULATION: 460 MS
OHS QTC CALCULATION: 467 MS
PERFUSION DEFECT 1 SIZE IN %: 20 %
PERFUSION DEFECT 2 SIZE IN %: 10 %
PHOSPHATE SERPL-MCNC: 5.3 MG/DL (ref 2.7–4.5)
PLATELET # BLD AUTO: 228 K/UL (ref 150–450)
PLATELET # BLD AUTO: 236 K/UL (ref 150–450)
PMV BLD AUTO: 10.3 FL (ref 9.2–12.9)
PMV BLD AUTO: 10.9 FL (ref 9.2–12.9)
POTASSIUM SERPL-SCNC: 4.3 MMOL/L (ref 3.5–5.1)
PROT SERPL-MCNC: 7.3 G/DL (ref 6–8.4)
RBC # BLD AUTO: 3.46 M/UL (ref 4.6–6.2)
RBC # BLD AUTO: 3.7 M/UL (ref 4.6–6.2)
REST FLOW - ANTERIOR: 0.89 CC/MIN/G
REST FLOW - INFERIOR: 0.45 CC/MIN/G
REST FLOW - LATERAL: 0.79 CC/MIN/G
REST FLOW - MAX: 1.12 CC/MIN/G
REST FLOW - MIN: 0.22 CC/MIN/G
REST FLOW - SEPTAL: 0.85 CC/MIN/G
REST FLOW - WHOLE HEART: 0.75 CC/MIN/G
REST FLOW- DEFECT 1: 0.94 CC/MIN/G
REST FLOW- DEFECT 2: 0.37 CC/MIN/G
RETIRED EF AND QEF - SEE NOTES: 47 %
SODIUM SERPL-SCNC: 133 MMOL/L (ref 136–145)
STRESS FLOW - ANTERIOR: 0.85 CC/MIN/G
STRESS FLOW - INFERIOR: 0.44 CC/MIN/G
STRESS FLOW - LATERAL: 0.72 CC/MIN/G
STRESS FLOW - MAX: 1.12 CC/MIN/G
STRESS FLOW - MIN: 0.25 CC/MIN/G
STRESS FLOW - SEPTAL: 0.73 CC/MIN/G
STRESS FLOW - WHOLE HEART: 0.69 CC/MIN/G
STRESS FLOW- DEFECT 1: 0.35 CC/MIN/G
STRESS FLOW- DEFECT 2: 0.39 CC/MIN/G
SYSTOLIC BLOOD PRESSURE: 158 MMHG
TROPONIN I SERPL DL<=0.01 NG/ML-MCNC: 1.4 NG/ML (ref 0–0.03)
TROPONIN I SERPL DL<=0.01 NG/ML-MCNC: 1.58 NG/ML (ref 0–0.03)
WBC # BLD AUTO: 5.49 K/UL (ref 3.9–12.7)
WBC # BLD AUTO: 6.29 K/UL (ref 3.9–12.7)

## 2024-10-31 PROCEDURE — 84484 ASSAY OF TROPONIN QUANT: CPT | Mod: 91 | Performed by: STUDENT IN AN ORGANIZED HEALTH CARE EDUCATION/TRAINING PROGRAM

## 2024-10-31 PROCEDURE — 25000003 PHARM REV CODE 250: Performed by: NURSE PRACTITIONER

## 2024-10-31 PROCEDURE — 80053 COMPREHEN METABOLIC PANEL: CPT | Performed by: STUDENT IN AN ORGANIZED HEALTH CARE EDUCATION/TRAINING PROGRAM

## 2024-10-31 PROCEDURE — 84100 ASSAY OF PHOSPHORUS: CPT | Performed by: STUDENT IN AN ORGANIZED HEALTH CARE EDUCATION/TRAINING PROGRAM

## 2024-10-31 PROCEDURE — 94761 N-INVAS EAR/PLS OXIMETRY MLT: CPT

## 2024-10-31 PROCEDURE — 84484 ASSAY OF TROPONIN QUANT: CPT | Performed by: NURSE PRACTITIONER

## 2024-10-31 PROCEDURE — 63600175 PHARM REV CODE 636 W HCPCS: Performed by: NURSE PRACTITIONER

## 2024-10-31 PROCEDURE — 99223 1ST HOSP IP/OBS HIGH 75: CPT | Mod: ,,, | Performed by: NURSE PRACTITIONER

## 2024-10-31 PROCEDURE — 85730 THROMBOPLASTIN TIME PARTIAL: CPT | Mod: 91 | Performed by: STUDENT IN AN ORGANIZED HEALTH CARE EDUCATION/TRAINING PROGRAM

## 2024-10-31 PROCEDURE — 83735 ASSAY OF MAGNESIUM: CPT | Performed by: STUDENT IN AN ORGANIZED HEALTH CARE EDUCATION/TRAINING PROGRAM

## 2024-10-31 PROCEDURE — 27000221 HC OXYGEN, UP TO 24 HOURS

## 2024-10-31 PROCEDURE — 63600175 PHARM REV CODE 636 W HCPCS: Performed by: STUDENT IN AN ORGANIZED HEALTH CARE EDUCATION/TRAINING PROGRAM

## 2024-10-31 PROCEDURE — 25000003 PHARM REV CODE 250: Performed by: STUDENT IN AN ORGANIZED HEALTH CARE EDUCATION/TRAINING PROGRAM

## 2024-10-31 PROCEDURE — A9555 RB82 RUBIDIUM: HCPCS | Performed by: STUDENT IN AN ORGANIZED HEALTH CARE EDUCATION/TRAINING PROGRAM

## 2024-10-31 PROCEDURE — 85730 THROMBOPLASTIN TIME PARTIAL: CPT | Performed by: NURSE PRACTITIONER

## 2024-10-31 PROCEDURE — 36415 COLL VENOUS BLD VENIPUNCTURE: CPT | Performed by: NURSE PRACTITIONER

## 2024-10-31 PROCEDURE — 36415 COLL VENOUS BLD VENIPUNCTURE: CPT | Mod: XB | Performed by: STUDENT IN AN ORGANIZED HEALTH CARE EDUCATION/TRAINING PROGRAM

## 2024-10-31 PROCEDURE — 20600001 HC STEP DOWN PRIVATE ROOM

## 2024-10-31 PROCEDURE — 96366 THER/PROPH/DIAG IV INF ADDON: CPT

## 2024-10-31 PROCEDURE — 85025 COMPLETE CBC W/AUTO DIFF WBC: CPT | Performed by: NURSE PRACTITIONER

## 2024-10-31 PROCEDURE — 36415 COLL VENOUS BLD VENIPUNCTURE: CPT | Performed by: STUDENT IN AN ORGANIZED HEALTH CARE EDUCATION/TRAINING PROGRAM

## 2024-10-31 RX ORDER — AMINOPHYLLINE 25 MG/ML
75 INJECTION, SOLUTION INTRAVENOUS ONCE
Status: COMPLETED | OUTPATIENT
Start: 2024-10-31 | End: 2024-10-31

## 2024-10-31 RX ORDER — METHOCARBAMOL 500 MG/1
500 TABLET, FILM COATED ORAL ONCE
Status: COMPLETED | OUTPATIENT
Start: 2024-10-31 | End: 2024-10-31

## 2024-10-31 RX ORDER — ACETAMINOPHEN 500 MG
1000 TABLET ORAL ONCE
Status: COMPLETED | OUTPATIENT
Start: 2024-10-31 | End: 2024-10-31

## 2024-10-31 RX ORDER — LIDOCAINE 50 MG/G
1 PATCH TOPICAL
Status: DISCONTINUED | OUTPATIENT
Start: 2024-10-31 | End: 2024-11-02 | Stop reason: HOSPADM

## 2024-10-31 RX ORDER — REGADENOSON 0.08 MG/ML
0.4 INJECTION, SOLUTION INTRAVENOUS ONCE
Status: COMPLETED | OUTPATIENT
Start: 2024-10-31 | End: 2024-10-31

## 2024-10-31 RX ADMIN — ACETAMINOPHEN 650 MG: 325 TABLET ORAL at 08:10

## 2024-10-31 RX ADMIN — HYDRALAZINE HYDROCHLORIDE 100 MG: 50 TABLET ORAL at 06:10

## 2024-10-31 RX ADMIN — NIFEDIPINE 90 MG: 60 TABLET, FILM COATED, EXTENDED RELEASE ORAL at 08:10

## 2024-10-31 RX ADMIN — CLOPIDOGREL BISULFATE 75 MG: 75 TABLET ORAL at 08:10

## 2024-10-31 RX ADMIN — HEPARIN SODIUM 12 UNITS/KG/HR: 10000 INJECTION, SOLUTION INTRAVENOUS at 09:10

## 2024-10-31 RX ADMIN — AMINOPHYLLINE 75 MG: 25 INJECTION, SOLUTION INTRAVENOUS at 09:10

## 2024-10-31 RX ADMIN — RUBIDIUM CHLORIDE RB-82 22.2 MILLICURIE: 150 INJECTION, SOLUTION INTRAVENOUS at 09:10

## 2024-10-31 RX ADMIN — LIDOCAINE 1 PATCH: 50 PATCH CUTANEOUS at 01:10

## 2024-10-31 RX ADMIN — Medication 6 MG: at 01:10

## 2024-10-31 RX ADMIN — SEVELAMER CARBONATE 1600 MG: 800 TABLET, FILM COATED ORAL at 04:10

## 2024-10-31 RX ADMIN — MINOXIDIL 10 MG: 2.5 TABLET ORAL at 08:10

## 2024-10-31 RX ADMIN — ASPIRIN 81 MG: 81 TABLET, COATED ORAL at 08:10

## 2024-10-31 RX ADMIN — HYDRALAZINE HYDROCHLORIDE 100 MG: 50 TABLET ORAL at 09:10

## 2024-10-31 RX ADMIN — REGADENOSON 0.4 MG: 0.08 INJECTION, SOLUTION INTRAVENOUS at 09:10

## 2024-10-31 RX ADMIN — EZETIMIBE 10 MG: 10 TABLET ORAL at 08:10

## 2024-10-31 RX ADMIN — CARVEDILOL 50 MG: 12.5 TABLET, FILM COATED ORAL at 08:10

## 2024-10-31 RX ADMIN — ACETAMINOPHEN 1000 MG: 500 TABLET ORAL at 01:10

## 2024-10-31 RX ADMIN — Medication 1 TABLET: at 08:10

## 2024-10-31 RX ADMIN — RUBIDIUM CHLORIDE RB-82 22.1 MILLICURIE: 150 INJECTION, SOLUTION INTRAVENOUS at 09:10

## 2024-10-31 RX ADMIN — RANOLAZINE 500 MG: 500 TABLET, EXTENDED RELEASE ORAL at 08:10

## 2024-10-31 RX ADMIN — ISOSORBIDE MONONITRATE 120 MG: 60 TABLET, EXTENDED RELEASE ORAL at 08:10

## 2024-10-31 RX ADMIN — CINACALCET HYDROCHLORIDE 60 MG: 30 TABLET, FILM COATED ORAL at 08:10

## 2024-10-31 RX ADMIN — METHOCARBAMOL 500 MG: 500 TABLET ORAL at 01:10

## 2024-10-31 RX ADMIN — ATORVASTATIN CALCIUM 80 MG: 40 TABLET, FILM COATED ORAL at 08:10

## 2024-10-31 NOTE — PROGRESS NOTES
"[facility] - Cardiology [facility]  Progress Note    Patient Name: Haroldo Dickinson  MRN: [id number]  Patient Class: OP- Observation   Admission Date: 10/30/2024  Length of Stay: 0 days  Attending Physician: Chon Sykes DO  Primary Care Provider: Mireya Larose MD        Subjective:     Principal Problem:NSTEMI (non-ST elevated myocardial infarction)        HPI:  Haroldo Dickinson is a 48yo M w/ ESRD on HD MWF ,CAD (s/p HILLARY of RCA 2020) s/p C Nov 2023 with three vessel disease deemed not a stent or surgical candidate, HTN, T2DM, CVA w/ residual R-sided deficits and RCC s/p bilateral nephrectomies who presented to the ED w/ CP. Admitted 10/28-10/29 w/ similar complaint. Per that course: Patient admitted to Hospital Medicine for evaluation of chest pain. Notable CAD hx w/ multiple presentations/admissions for chest pain and Type 2 NSTEMI/NSTEMI/elevated troponin. HILLARY placed to RCA in 2020. Most recently, [facility] (Nov 2023) noted 3VD. Apparently pt's anatomy is not amenable to revascularization, and has been advised per cardiology to manage medically. Troponin trend this admission flat. Continued on DAPT, statin, beta-blocker. Cardiology consulted. Suspect type 2 2/2 hypertensive urgency/emergency & volume overload; no utility for PET 2/2 [facility] findings. Patient underwent HD session with fluid removal with improvement of chest pain. His antihypertensive regimen was also increased; specifically, minoxidil increased to 10 mg b.i.d. and per Cardiology recommendations, Ranexa added; given ESRD however, will initiate 500 daily versus b.i.d.   Today, Pt notes onset of mild chest pain radiating to his left arm during dialysis; 3/10, described as "sharp".  He notes that his blood pressure was low during dialysis and they were not able to pull off as much volume as they intended.  He denies any diaphoresis, palpitations or nausea at the time.  At present, he is chest pain-free and feels 100%.  He notes he has yet to pick " up Ranexa and did not take minoxidil this morning.  His son however is picking up Ranexa today.  Hospital medicine asked to admit for further evaluation.        Overview/Hospital Course:  Admitted with chest pain during dialysis session and concern for NSTEMI.  Cardiology consulted on arrival, recommended ACS protocol along with PET stress test. PET noted two significant fixed perfusion abnormalities.  Discussed findings with Cardiology team, no intervention recommended.  Recommending medical management and up titration of ranolazine unless precluded by renal function.  Continuing 48hr ACS protocol    Interval History: Seen this AM at bedside. Chest pain noted overnight, started on ACS protocol. Denies active chest pain    Review of Systems  Objective:     Vital Signs (Most Recent):  Temp: 98.2 °F (36.8 °C) (10/31/24 1136)  Pulse: 78 (10/31/24 1200)  Resp: 20 (10/31/24 1136)  BP: (!) 107/55 (10/31/24 1136)  SpO2: 96 % (10/31/24 1300) Vital Signs (24h Range):  Temp:  [97.7 °F (36.5 °C)-98.2 °F (36.8 °C)] 98.2 °F (36.8 °C)  Pulse:  [66-84] 78  Resp:  [14-22] 20  SpO2:  [95 %-100 %] 96 %  BP: (107-158)/() 107/55     Weight: 72.1 kg (159 lb)  Body mass index is 24.9 kg/m².    Intake/Output Summary (Last 24 hours) at 10/31/2024 1412  Last data filed at 10/31/2024 1347  Gross per 24 hour   Intake 480 ml   Output --   Net 480 ml         Physical Exam  Vitals and nursing note reviewed.   Constitutional:       General: He is not in acute distress.     Appearance: He is not toxic-appearing or diaphoretic.   HENT:      Head: Normocephalic and atraumatic.      Right Ear: External ear normal.      Left Ear: External ear normal.      Nose: Nose normal.      Mouth/Throat:      Pharynx: Oropharynx is clear.   Eyes:      General: No scleral icterus.  Cardiovascular:      Rate and Rhythm: Normal rate and regular rhythm.      Pulses: Normal pulses.      Heart sounds: Normal heart sounds.   Pulmonary:      Effort: Pulmonary  effort is normal.      Breath sounds: Normal breath sounds. No wheezing, rhonchi or rales.   Abdominal:      General: Bowel sounds are normal.      Palpations: Abdomen is soft.      Tenderness: There is no abdominal tenderness.   Musculoskeletal:         General: No tenderness.      Cervical back: Normal range of motion and neck supple.      Right lower leg: No edema.      Left lower leg: No edema.   Lymphadenopathy:      Cervical: No cervical adenopathy.   Skin:     General: Skin is warm and dry.      Capillary Refill: Capillary refill takes less than 2 seconds.      Comments: L UE w/ fistula, + thrill   Neurological:      Mental Status: He is alert and oriented to person, place, and time.   Psychiatric:         Behavior: Behavior normal.             Significant Labs: All pertinent labs within the past 24 hours have been reviewed.    Significant Imaging: I have reviewed all pertinent imaging results/findings within the past 24 hours.    Assessment/Plan:      * NSTEMI (non-ST elevated myocardial infarction)  Triple-vessel Coronary artery disease  Hypertensive emergency  Type II NSTEMI (demand ischemia)      Notable CAD hx w/ multiple presentations/admissions for chest pain and Type 2 NSTEMI/NSTEMI/elevated troponin. HILLARY placed to RCA in 2020. Most recently, [facility] (Nov 2023) noted 3VD. Apparently pt's anatomy is not amenable to revascularization, and has been advised per cardiology to manage medically. Presented w/ L-sided chest pain and troponin elevation. EKG w/ nonspecific ST and T-wave abnormalities.     - started on ACS protocol  - cardiac stress PET done, noting to significant fixed perfusion abnormalities   - discussed findings with Cardiology, no intervention recommended  - Cont medical management and uptitration of ranolazine unless precluded by renal function  - Continue home DAPT & statin  - Continue home BB  - BP mgmt as per HTN; optimize  - continue to monitor on tele  - SL nitro prn      Type 2 diabetes  mellitus with chronic kidney disease on chronic dialysis, without long-term current use of insulin  Creatine stable for now. BMP reviewed- noted Estimated Creatinine Clearance: 10.9 mL/min (A) (based on SCr of 7.8 mg/dL (H)). according to latest data. Based on current GFR, CKD stage is end stage.  Monitor UOP and serial BMP and adjust therapy as needed. Renally dose meds. Avoid nephrotoxic medications and procedures.    Anemia in end-stage renal disease  Anemia is likely due to  esrd . Most recent hemoglobin and hematocrit are listed below.  Recent Labs     10/30/24  1253 10/30/24  2301 10/31/24  0543   HGB 10.4* 10.5* 11.1*   HCT 31.2* 33.3* 35.5*     Plan  - Monitor serial CBC: Daily  - Transfuse PRBC if patient becomes hemodynamically unstable, symptomatic or H/H drops below 7/21.  - Patient has not received any PRBC transfusions to date  - Patient's anemia is currently stable    S/p nephrectomy  Hx of RCC  S/p bilateral nephrectomies   Hx of RCC- first of R kidney in 2022 (s/p R nephrectomy May 2022), then of L kidney in 2023 (s/p L nephrectomy in 2023).   - HD as above       Essential hypertension  Patients blood pressure range in the last 24 hours was: BP  Min: 104/54  Max: 225/105.The patient's inpatient anti-hypertensive regimen is listed below:  Current Antihypertensives  carvediloL tablet 50 mg, 2 times daily with meals, Oral  hydrALAZINE tablet 100 mg, Every 8 hours, Oral  isosorbide mononitrate 24 hr tablet 120 mg, Daily, Oral  minoxidiL tablet 10 mg, 2 times daily, Oral  NIFEdipine 24 hr tablet 90 mg, Daily, Oral  ranolazine 12 hr tablet 500 mg, 2 times daily, Oral  cloNIDine tablet 0.3 mg, Every 12 hours PRN, Oral  nitroGLYCERIN SL tablet 0.4 mg, Every 5 min PRN, Sublingual    Plan  - BP is controlled, no changes needed to their regimen  Hx of chronic poorly-controlled HTN (w/ complications of CVA & ESRD), managed w/ home regimen of minoxidil, carvedilol, nifedipine, Imdur & hydralazine (and volume  mgmt w/ HD). Pt reports medication & HD compliance. Hypertensive on arrival. Suspect uncontrolled HTN contributing to presentation.  Can consider increasing minoxidil if remains uncontrolled.    Hold BP meds prior to HD on HD days in administer after HD to prevent hypotension and less than optimal volume removal.        ESRD on hemodialysis  Creatine stable for now. BMP reviewed- noted Estimated Creatinine Clearance: 10.9 mL/min (A) (based on SCr of 7.8 mg/dL (H)). according to latest data. Based on current GFR, CKD stage is end stage.  Monitor UOP and serial BMP and adjust therapy as needed. Renally dose meds. Avoid nephrotoxic medications and procedures.    -Pt undergoes HD MWF-Nephology consulted: further decision for HD per nephology  -Renally dose medications and avoid nephrotoxic medications to preserve residual renal function   -Strict I/O's and daily weights  -Renal diet   -Continue to monitor renal function with daily labs    Hemiparesis affecting right side as late effect of cerebrovascular accident  Hx of CVA  R-sided weakness  Remote CVA hx (2013?) re: uncontrolled HTN w/ residual R-sided weakness. No acute issues/concerns on current admission.   - Continue ASA & statin  - BP control as per HTN        VTE Risk Mitigation (From admission, onward)           Ordered     heparin 25,000 units in dextrose 5% (100 units/ml) IV bolus from bag LOW INTENSITY nomogram - [facility]  As needed (PRN)        Question:  Heparin Infusion Adjustment (DO NOT MODIFY ANSWER)  Answer:  \\[email or web address]\epic\Images\Pharmacy\HeparinInfusions\heparin LOW INTENSITY nomogram for [facility] [id number].pdf    10/30/24 2240     heparin 25,000 units in dextrose 5% (100 units/ml) IV bolus from bag LOW INTENSITY nomogram - [facility]  As needed (PRN)        Question:  Heparin Infusion Adjustment (DO NOT MODIFY ANSWER)  Answer:  [email or web address]\epic\Images\Pharmacy\HeparinInfusions\heparin LOW INTENSITY nomogram for [facility] [id number].pdf    10/30/24 2240     heparin 25,000  units in dextrose 5% 250 mL (100 units/mL) infusion LOW INTENSITY nomogram - [facility]  Continuous        Question:  Begin at (units/kg/hr)  Answer:  12    10/30/24 7474     Place sequential compression device  Until discontinued         10/30/24 1613                    Discharge Planning   QUIN: 10/31/2024     Code Status: Full Code   Is the patient medically ready for discharge?:     Reason for patient still in hospital (select all that apply): Patient trending condition                     Chon Sykes DO  Department of Hospital Medicine   [facility] - Cardiology Stepdown

## 2024-10-31 NOTE — ASSESSMENT & PLAN NOTE
Triple-vessel Coronary artery disease  Hypertensive emergency  Type II NSTEMI (demand ischemia)      Notable CAD hx w/ multiple presentations/admissions for chest pain and Type 2 NSTEMI/NSTEMI/elevated troponin. HILLARY placed to RCA in 2020. Most recently, [facility] (Nov 2023) noted 3VD. Apparently pt's anatomy is not amenable to revascularization, and has been advised per cardiology to manage medically. Presented w/ L-sided chest pain and troponin elevation. EKG w/ nonspecific ST and T-wave abnormalities.     - started on ACS protocol  - cardiac stress PET done, noting to significant fixed perfusion abnormalities   - discussed findings with Cardiology, no intervention recommended  - Cont medical management and uptitration of ranolazine unless precluded by renal function  - Continue home DAPT & statin  - Continue home BB  - BP mgmt as per HTN; optimize  - continue to monitor on tele  - SL nitro prn

## 2024-10-31 NOTE — HPI
Haroldo Dickinson is a 50yo M w/ ESRD on HD MWF ,CAD (s/p HILLARY of RCA 2020) s/p [facility] Nov 2023 with three vessel disease deemed not a stent or surgical candidate, HTN, T2DM, CVA w/ residual R-sided deficits and RCC s/p bilateral nephrectomies who presented to the ED w/ CP. Admitted 10/28-10/29 w/ similar complaint. Patient was undergoing his OP dialysis treatment on day of presentation when he developed chest pain radiating to his L arm 2 hours into his 4 hour treatment. On arrival, labs consistent with ESRD without emergent electrolyte abnormalities, , Trop 0.75 peaking at 1.576.  Cardiology evaluated with plans for PET scan 10/31.  Nephrology has been consulted for ESRD management while IP.

## 2024-10-31 NOTE — ED NOTES
Assumed care at this time. Pt sitting up in bed. No distress noted. Pt is alert and oriented. Call light in reach.

## 2024-10-31 NOTE — PLAN OF CARE
Plan of care discussed with patient. Patient is free of fall/trauma/injury. Denies CP, SOB, or pain/discomfort. All questions addressed. Will continue to monitor. AAOx4 and VSS. Heparin gtt infusing, see MAR.   Problem: Adult Inpatient Plan of Care  Goal: Plan of Care Review  Outcome: Progressing  Goal: Patient-Specific Goal (Individualized)  Outcome: Progressing  Goal: Absence of Hospital-Acquired Illness or Injury  Outcome: Progressing  Goal: Optimal Comfort and Wellbeing  Outcome: Progressing  Goal: Readiness for Transition of Care  Outcome: Progressing     Problem: Diabetes Comorbidity  Goal: Blood Glucose Level Within Targeted Range  Outcome: Progressing     Problem: Fall Injury Risk  Goal: Absence of Fall and Fall-Related Injury  Outcome: Progressing     Problem: Hemodialysis  Goal: Safe, Effective Therapy Delivery  Outcome: Progressing  Goal: Effective Tissue Perfusion  Outcome: Progressing  Goal: Absence of Infection Signs and Symptoms  Outcome: Progressing

## 2024-10-31 NOTE — PROGRESS NOTES
[facility] - Cardiology Stepdown  Cardiology  Progress Note    Patient Name: Haroldo Dickinson  MRN: [id number]  Admission Date: 10/30/2024  Hospital Length of Stay: 0 days  Code Status: Full Code   Attending Physician: Chon Sykes DO   Primary Care Physician: Mireya Larose MD  Expected Discharge Date: 10/31/2024  Principal Problem:NSTEMI (non-ST elevated myocardial infarction)    Subjective:     Hospital Course:   Patient admitted for NSTEMI, currently in ACS protocol. Has known TVD but interventions limited by anatomy of known lesions and he is not a CABG candidate. He will undergo PET stress while admitted.    Interval History: Patient single episode of chest pain last night that resolved with nitro. PET stress today.    Review of Systems   Constitutional: Negative for chills and fever.   Cardiovascular:  Positive for chest pain. Negative for dyspnea on exertion, irregular heartbeat, leg swelling, orthopnea, palpitations and syncope.   Respiratory:  Negative for shortness of breath.    Musculoskeletal:  Positive for back pain and joint pain.   Gastrointestinal:  Negative for abdominal pain, nausea and vomiting.   Neurological:  Negative for light-headedness.     Objective:     Vital Signs (Most Recent):  Temp: 97.7 °F (36.5 °C) (10/31/24 0754)  Pulse: 72 (10/31/24 1100)  Resp: 16 (10/31/24 0934)  BP: (!) 131/58 (10/31/24 0934)  SpO2: 98 % (10/31/24 0831) Vital Signs (24h Range):  Temp:  [97.7 °F (36.5 °C)-98.2 °F (36.8 °C)] 97.7 °F (36.5 °C)  Pulse:  [66-84] 72  Resp:  [14-22] 16  SpO2:  [95 %-100 %] 98 %  BP: (113-158)/() 131/58     Weight: 72.1 kg (159 lb)  Body mass index is 24.9 kg/m².     SpO2: 98 %         Intake/Output Summary (Last 24 hours) at 10/31/2024 1101  Last data filed at 10/30/2024 2200  Gross per 24 hour   Intake 240 ml   Output --   Net 240 ml       Lines/Drains/Airways       Peripheral Intravenous Line  Duration                  Hemodialysis AV Fistula Right forearm -- days         Peripheral  IV - Single Lumen 10/30/24 1246 20 G Left Antecubital <1 day                       Physical Exam  Vitals reviewed.   Constitutional:       General: He is not in acute distress.     Appearance: He is not ill-appearing or diaphoretic.   Eyes:      General: No scleral icterus.     Extraocular Movements: Extraocular movements intact.   Cardiovascular:      Rate and Rhythm: Normal rate and regular rhythm.      Pulses: Normal pulses.      Heart sounds: Murmur (systolic) heard.   Pulmonary:      Effort: Pulmonary effort is normal. No respiratory distress.      Breath sounds: Normal breath sounds. No wheezing or rales.   Abdominal:      General: There is no distension.      Palpations: Abdomen is soft.   Musculoskeletal:      Right lower leg: No edema.      Left lower leg: No edema.   Skin:     General: Skin is warm and dry.   Neurological:      Mental Status: He is alert and oriented to person, place, and time.            Significant Labs: CMP   Recent Labs   Lab 10/30/24  1253 10/31/24  0543   * 133*   K 3.7 4.3    100   CO2 22* 20*   * 136*   BUN 38* 52*   CREATININE 7.8* 10.0*   CALCIUM 8.6* 9.3   PROT 7.0 7.3   ALBUMIN 3.1* 3.2*   BILITOT 0.6 0.5   ALKPHOS 59 67   AST 15 17   ALT 9* 11   ANIONGAP 10 13   , CBC   Recent Labs   Lab 10/30/24  1253 10/30/24  2301 10/31/24  0543   WBC 5.53 5.49 6.29   HGB 10.4* 10.5* 11.1*   HCT 31.2* 33.3* 35.5*    228 236   , Troponin   Recent Labs   Lab 10/30/24  2301 10/31/24  0130 10/31/24  0543   TROPONINI 1.461* 1.576* 1.401*   , and All pertinent lab results from the last 24 hours have been reviewed.    Significant Imaging:  All pertinent imaging within the last 24 hours has been reviewed.   Assessment and Plan:         NSTEMI (non-ST elevated myocardial infarction)    Pt reports acute onset CP located left side on his chest while he was getting HD. CP described as sharp and severe and with radiation to his L arm and lasted x 60 min. Of note, he was  recently discharged on 10/28 for a similar presentation.     Recommendations:  - PET stress showed 2 areas of ischemia, but he is not a candidate for intervention as there is no target for revascularization. Recommend continued medical management.  - Can consider increasing dose of ranolazine to 1,000 mg BID as renal function allows  -Control demand ischemia   -Continue ACS protocol for 48 hours        VTE Risk Mitigation (From admission, onward)           Ordered     heparin 25,000 units in dextrose 5% (100 units/ml) IV bolus from bag LOW INTENSITY nomogram - [facility]  As needed (PRN)        Question:  Heparin Infusion Adjustment (DO NOT MODIFY ANSWER)  Answer:  \\[email or web address]\epic\Images\Pharmacy\HeparinInfusions\heparin LOW INTENSITY nomogram for [facility] [id number].pdf    10/30/24 2240     heparin 25,000 units in dextrose 5% (100 units/ml) IV bolus from bag LOW INTENSITY nomogram - [facility]  As needed (PRN)        Question:  Heparin Infusion Adjustment (DO NOT MODIFY ANSWER)  Answer:  \[email or web address]\epic\Images\Pharmacy\HeparinInfusions\heparin LOW INTENSITY nomogram for [facility] [id number].pdf    10/30/24 2240     heparin 25,000 units in dextrose 5% 250 mL (100 units/mL) infusion LOW INTENSITY nomogram - [facility]  Continuous        Question:  Begin at (units/kg/hr)  Answer:  12    10/30/24 2240     Place sequential compression device  Until discontinued         10/30/24 1613                  Cardiology will sign off, please reach out with any additional questions.    Mathieu Gonsalves, DO  Cardiology  [facility] - Cardiology Stepdown

## 2024-10-31 NOTE — ASSESSMENT & PLAN NOTE
ESRD on iHD MWF  [facility]  4 hours  EDW:  71.5 kg  ALBARO AVF    Plan/Recommendations:  -No emergent need for RRT today  -will plan for next session tomorrow per his OP schedule  -strict I/O's  -Renal diet when advanced  -RFP daily  -phos binders with meals    Anemia of ESRD  -hgb @ goal for ESRD  -continue trending

## 2024-10-31 NOTE — SUBJECTIVE & OBJECTIVE
Past Medical History:   Diagnosis Date    CAD (coronary artery disease), native coronary artery 11/21/2019    Cataract     Diabetes mellitus     Diabetic retinopathy     Dialysis patient     DM type 2 causing renal disease, not at goal     ESRD (end stage renal disease) started dialysis 01/2014 06/05/2014    History of colon polyps 02/10/2021    History of COVID-19 12/29/2022    Hyperparathyroidism, secondary renal 06/05/2014    Hypertension     NSTEMI (non-ST elevated myocardial infarction) 12/21/2013    Organ transplant candidate 06/05/2014    Palliative care encounter 10/29/2024    Pleural effusion 06/07/2024    Pneumonia     Post PTCA 08/26/2020    RCA [facility] 7-25-20    Renal cell carcinoma of right kidney     Renal hypertension     Stroke        Past Surgical History:   Procedure Laterality Date    ANGIOGRAM, CORONARY, WITH LEFT HEART CATHETERIZATION N/A 7/1/2021    Procedure: Angiogram, Coronary, with Left Heart Cath;  Surgeon: Miki Zendejas MD;  Location: [facility] CATH LAB;  Service: Cardiology;  Laterality: N/A;    ANGIOGRAM, CORONARY, WITH LEFT HEART CATHETERIZATION N/A 11/28/2023    Procedure: Angiogram, Coronary, with Left Heart Cath;  Surgeon: Noah Pendleton MD;  Location: [facility] CATH LAB;  Service: Cardiology;  Laterality: N/A;    COLONOSCOPY N/A 5/3/2017    Procedure: COLONOSCOPY;  Surgeon: Rufino Carpenter MD;  Location: [facility] (4TH FLR);  Service: Endoscopy;  Laterality: N/A;  pt states can only schedule on Wednesdays    COLONOSCOPY N/A 2/9/2021    Procedure: COLONOSCOPY;  Surgeon: Edil Wong MD;  Location: [facility] (4TH FLR);  Service: Endoscopy;  Laterality: N/A;  Dialysis MWF/ labwork day of procedure  right arm aceess  per Dr. Colin pt can hold Plavix 5 days prior see note- sm  COVID test on 2/6/21 at [facility] - sm    COLONOSCOPY N/A 2/10/2021    Procedure: COLONOSCOPY;  Surgeon: Robert Ayers MD;  Location: [facility] (4TH FLR);  Service: Endoscopy;  Laterality: N/A;  rescheduled due to poor  bowel prep-BB  negative covid screening 2/6/21-BB  dialysis M-W-F-BB  okay to r/s for 2/9/21 and to hold Plavix per Dr. KIRAN Wong-BB  labs same day-BB    CORONARY STENT PLACEMENT N/A 7/25/2019    Procedure: INSERTION, STENT, CORONARY ARTERY;  Surgeon: Miki Zendejas MD;  Location: [facility] CATH LAB;  Service: Cardiology;  Laterality: N/A;    DIALYSIS FISTULA CREATION      FISTULOGRAM N/A 2/18/2019    Procedure: Fistulogram;  Surgeon: Yaneth De La Cruz MD;  Location: [facility] CATH LAB;  Service: Cardiology;  Laterality: N/A;    FISTULOGRAM Right 7/23/2019    Procedure: Fistulogram;  Surgeon: Oz Cordoba MD;  Location: [facility] CATH LAB;  Service: Cardiology;  Laterality: Right;    LAPAROSCOPIC ROBOT-ASSISTED SURGICAL REMOVAL OF KIDNEY USING DA JAIME XI Right 5/19/2022    Procedure: XI ROBOTIC NEPHRECTOMY;  Surgeon: Aidan Hendricks MD;  Location: [address];  Service: Urology;  Laterality: Right;  3hrs    LAPAROSCOPIC ROBOT-ASSISTED SURGICAL REMOVAL OF KIDNEY USING DA JAIME XI Left 1/5/2023    Procedure: XI ROBOTIC NEPHRECTOMY;  Surgeon: Aidan Hendricks MD;  [facility];  Service: Urology;  Laterality: Left;  4 hrs    LEFT HEART CATHETERIZATION Left 7/25/2019    Procedure: Left heart cath;  Surgeon: Miki Zendejas MD;  Location: [facility] CATH LAB;  Service: Cardiology;  Laterality: Left;    REPAIR  1/5/2023    Procedure: REPAIR; COLOTOMY;  Surgeon: Maikel Lamb MD;  Location: [address];  Service: General;;    RETINAL LASER PROCEDURE Bilateral 2018 or 2017    Dr. Rothman    VASCULAR SURGERY         Review of patient's allergies indicates:   Allergen Reactions    No known allergies      Current Facility-Administered Medications   Medication Frequency    acetaminophen tablet 650 mg Q4H PRN    aspirin EC tablet 81 mg Daily    atorvastatin tablet 80 mg Daily    B-complex with vitamin C tablet 1 tablet Daily    carvediloL tablet 50 mg BID WM    cinacalcet tablet 60 mg Daily with breakfast     cloNIDine tablet 0.3 mg Q12H PRN    clopidogreL tablet 75 mg Daily    dextrose 10% bolus 125 mL 125 mL PRN    dextrose 10% bolus 125 mL 125 mL PRN    dextrose 10% bolus 250 mL 250 mL PRN    dextrose 10% bolus 250 mL 250 mL PRN    ezetimibe tablet 10 mg Daily    glucagon (human recombinant) injection 1 mg PRN    glucose chewable tablet 16 g PRN    glucose chewable tablet 24 g PRN    heparin 25,000 units in dextrose 5% (100 units/ml) IV bolus from bag LOW INTENSITY nomogram - [facility] PRN    heparin 25,000 units in dextrose 5% (100 units/ml) IV bolus from bag LOW INTENSITY nomogram - [facility] PRN    heparin 25,000 units in dextrose 5% 250 mL (100 units/mL) infusion LOW INTENSITY nomogram - [facility] Continuous    hydrALAZINE tablet 100 mg Q8H    isosorbide mononitrate 24 hr tablet 120 mg Daily    LIDOcaine 5 % patch 1 patch Q24H    melatonin tablet 6 mg Nightly PRN    minoxidiL tablet 10 mg BID    naloxone 0.4 mg/mL injection 0.02 mg PRN    NIFEdipine 24 hr tablet 90 mg Daily    nitroGLYCERIN SL tablet 0.4 mg Q5 Min PRN    ondansetron injection 4 mg Q8H PRN    polyethylene glycol packet 17 g Daily    ranolazine 12 hr tablet 500 mg Daily    sevelamer carbonate tablet 1,600 mg TID WM    sodium chloride 0.9% flush 10 mL Q12H PRN     Facility-Administered Medications Ordered in Other Encounters   Medication Frequency    lidocaine (PF) 10 mg/ml (1%) injection 10 mg Once     Family History       Problem Relation (Age of Onset)    Cancer Maternal Grandfather    Cataracts Mother    Diabetes Mother    Heart disease Brother    Hypertension Mother, Father, Sister, Brother    Kidney disease Brother          Tobacco Use    Smoking status: Never    Smokeless tobacco: Never   Substance and Sexual Activity    Alcohol use: Not Currently     Comment: One drink a month    Drug use: No    Sexual activity: Yes     Partners: Female     Birth control/protection: None     Review of Systems   Constitutional:  Negative for chills, diaphoresis and fever.    HENT:  Negative for congestion, sore throat and trouble swallowing.    Eyes:  Negative for visual disturbance.   Respiratory:  Negative for cough, shortness of breath and wheezing.    Cardiovascular:  Positive for chest pain. Negative for palpitations and leg swelling.   Gastrointestinal:  Negative for abdominal pain, diarrhea, nausea and vomiting.   Genitourinary:         Anuric   Musculoskeletal:  Negative for arthralgias.   Skin:  Negative for wound.   Neurological:  Negative for dizziness, light-headedness and headaches.   Psychiatric/Behavioral:  Negative for behavioral problems, confusion and decreased concentration.      Objective:     Vital Signs (Most Recent):  Temp: 97.7 °F (36.5 °C) (10/31/24 0754)  Pulse: 80 (10/31/24 0934)  Resp: 16 (10/31/24 0934)  BP: (!) 131/58 (10/31/24 0934)  SpO2: 98 % (10/31/24 0831) Vital Signs (24h Range):  Temp:  [97.7 °F (36.5 °C)-98.2 °F (36.8 °C)] 97.7 °F (36.5 °C)  Pulse:  [66-84] 80  Resp:  [14-22] 16  SpO2:  [95 %-100 %] 98 %  BP: (113-158)/() 131/58     Weight: 72.1 kg (159 lb) (10/31/24 0909)  Body mass index is 24.9 kg/m².  Body surface area is 1.85 meters squared.    I/O last 3 completed shifts:  In: 240 [P.O.:240]  Out: -      Physical Exam  Vitals and nursing note reviewed.   Constitutional:       General: He is not in acute distress.     Appearance: He is not toxic-appearing or diaphoretic.   HENT:      Head: Normocephalic and atraumatic.      Right Ear: External ear normal.      Left Ear: External ear normal.      Nose: Nose normal.      Mouth/Throat:      Pharynx: Oropharynx is clear.   Eyes:      General: No scleral icterus.  Cardiovascular:      Rate and Rhythm: Normal rate and regular rhythm.      Pulses: Normal pulses.      Heart sounds: Normal heart sounds.   Pulmonary:      Effort: Pulmonary effort is normal.      Breath sounds: Normal breath sounds. No wheezing, rhonchi or rales.   Abdominal:      General: Bowel sounds are normal.      Palpations:  Abdomen is soft.      Tenderness: There is no abdominal tenderness.   Musculoskeletal:         General: No tenderness.      Cervical back: Normal range of motion and neck supple.      Right lower leg: No edema.      Left lower leg: No edema.   Lymphadenopathy:      Cervical: No cervical adenopathy.   Skin:     General: Skin is warm and dry.      Comments: L UE w/ fistula, + thrill   Neurological:      Mental Status: He is alert and oriented to person, place, and time.   Psychiatric:         Behavior: Behavior normal.          Significant Labs:  CBC:   Recent Labs   Lab 10/31/24  0543   WBC 6.29   RBC 3.70*   HGB 11.1*   HCT 35.5*      MCV 96   MCH 30.0   MCHC 31.3*     CMP:   Recent Labs   Lab 10/31/24  0543   *   CALCIUM 9.3   ALBUMIN 3.2*   PROT 7.3   *   K 4.3   CO2 20*      BUN 52*   CREATININE 10.0*   ALKPHOS 67   ALT 11   AST 17   BILITOT 0.5

## 2024-10-31 NOTE — SUBJECTIVE & OBJECTIVE
Interval History: Patient single episode of chest pain last night that resolved with nitro. PET stress today.    Review of Systems   Constitutional: Negative for chills and fever.   Cardiovascular:  Positive for chest pain. Negative for dyspnea on exertion, irregular heartbeat, leg swelling, orthopnea, palpitations and syncope.   Respiratory:  Negative for shortness of breath.    Musculoskeletal:  Positive for back pain and joint pain.   Gastrointestinal:  Negative for abdominal pain, nausea and vomiting.   Neurological:  Negative for light-headedness.     Objective:     Vital Signs (Most Recent):  Temp: 97.7 °F (36.5 °C) (10/31/24 0754)  Pulse: 72 (10/31/24 1100)  Resp: 16 (10/31/24 0934)  BP: (!) 131/58 (10/31/24 0934)  SpO2: 98 % (10/31/24 0831) Vital Signs (24h Range):  Temp:  [97.7 °F (36.5 °C)-98.2 °F (36.8 °C)] 97.7 °F (36.5 °C)  Pulse:  [66-84] 72  Resp:  [14-22] 16  SpO2:  [95 %-100 %] 98 %  BP: (113-158)/() 131/58     Weight: 72.1 kg (159 lb)  Body mass index is 24.9 kg/m².     SpO2: 98 %         Intake/Output Summary (Last 24 hours) at 10/31/2024 1101  Last data filed at 10/30/2024 2200  Gross per 24 hour   Intake 240 ml   Output --   Net 240 ml       Lines/Drains/Airways       Peripheral Intravenous Line  Duration                  Hemodialysis AV Fistula Right forearm -- days         Peripheral IV - Single Lumen 10/30/24 1246 20 G Left Antecubital <1 day                       Physical Exam  Vitals reviewed.   Constitutional:       General: He is not in acute distress.     Appearance: He is not ill-appearing or diaphoretic.   Eyes:      General: No scleral icterus.     Extraocular Movements: Extraocular movements intact.   Cardiovascular:      Rate and Rhythm: Normal rate and regular rhythm.      Pulses: Normal pulses.      Heart sounds: Murmur (systolic) heard.   Pulmonary:      Effort: Pulmonary effort is normal. No respiratory distress.      Breath sounds: Normal breath sounds. No wheezing or  rales.   Abdominal:      General: There is no distension.      Palpations: Abdomen is soft.   Musculoskeletal:      Right lower leg: No edema.      Left lower leg: No edema.   Skin:     General: Skin is warm and dry.   Neurological:      Mental Status: He is alert and oriented to person, place, and time.            Significant Labs: CMP   Recent Labs   Lab 10/30/24  1253 10/31/24  0543   * 133*   K 3.7 4.3    100   CO2 22* 20*   * 136*   BUN 38* 52*   CREATININE 7.8* 10.0*   CALCIUM 8.6* 9.3   PROT 7.0 7.3   ALBUMIN 3.1* 3.2*   BILITOT 0.6 0.5   ALKPHOS 59 67   AST 15 17   ALT 9* 11   ANIONGAP 10 13   , CBC   Recent Labs   Lab 10/30/24  1253 10/30/24  2301 10/31/24  0543   WBC 5.53 5.49 6.29   HGB 10.4* 10.5* 11.1*   HCT 31.2* 33.3* 35.5*    228 236   , Troponin   Recent Labs   Lab 10/30/24  2301 10/31/24  0130 10/31/24  0543   TROPONINI 1.461* 1.576* 1.401*   , and All pertinent lab results from the last 24 hours have been reviewed.    Significant Imaging:  All pertinent imaging within the last 24 hours has been reviewed.

## 2024-10-31 NOTE — SUBJECTIVE & OBJECTIVE
Interval History: Seen this AM at bedside. Chest pain noted overnight, started on ACS protocol. Denies active chest pain    Review of Systems  Objective:     Vital Signs (Most Recent):  Temp: 98.2 °F (36.8 °C) (10/31/24 1136)  Pulse: 78 (10/31/24 1200)  Resp: 20 (10/31/24 1136)  BP: (!) 107/55 (10/31/24 1136)  SpO2: 96 % (10/31/24 1300) Vital Signs (24h Range):  Temp:  [97.7 °F (36.5 °C)-98.2 °F (36.8 °C)] 98.2 °F (36.8 °C)  Pulse:  [66-84] 78  Resp:  [14-22] 20  SpO2:  [95 %-100 %] 96 %  BP: (107-158)/() 107/55     Weight: 72.1 kg (159 lb)  Body mass index is 24.9 kg/m².    Intake/Output Summary (Last 24 hours) at 10/31/2024 1412  Last data filed at 10/31/2024 1347  Gross per 24 hour   Intake 480 ml   Output --   Net 480 ml         Physical Exam  Vitals and nursing note reviewed.   Constitutional:       General: He is not in acute distress.     Appearance: He is not toxic-appearing or diaphoretic.   HENT:      Head: Normocephalic and atraumatic.      Right Ear: External ear normal.      Left Ear: External ear normal.      Nose: Nose normal.      Mouth/Throat:      Pharynx: Oropharynx is clear.   Eyes:      General: No scleral icterus.  Cardiovascular:      Rate and Rhythm: Normal rate and regular rhythm.      Pulses: Normal pulses.      Heart sounds: Normal heart sounds.   Pulmonary:      Effort: Pulmonary effort is normal.      Breath sounds: Normal breath sounds. No wheezing, rhonchi or rales.   Abdominal:      General: Bowel sounds are normal.      Palpations: Abdomen is soft.      Tenderness: There is no abdominal tenderness.   Musculoskeletal:         General: No tenderness.      Cervical back: Normal range of motion and neck supple.      Right lower leg: No edema.      Left lower leg: No edema.   Lymphadenopathy:      Cervical: No cervical adenopathy.   Skin:     General: Skin is warm and dry.      Capillary Refill: Capillary refill takes less than 2 seconds.      Comments: L UE w/ fistula, + thrill    Neurological:      Mental Status: He is alert and oriented to person, place, and time.   Psychiatric:         Behavior: Behavior normal.             Significant Labs: All pertinent labs within the past 24 hours have been reviewed.    Significant Imaging: I have reviewed all pertinent imaging results/findings within the past 24 hours.

## 2024-10-31 NOTE — HOSPITAL COURSE
Patient admitted for NSTEMI, currently in ACS protocol. Has known TVD but interventions limited by anatomy of known lesions and he is not a CABG candidate. He will undergo PET stress while admitted.

## 2024-10-31 NOTE — CONSULTS
[facility] - Cardiology Stepdown  Nephrology  Consult Note    Patient Name: Haroldo Dickinson  MRN: [id number]  Admission Date: 10/30/2024  Hospital Length of Stay: 0 days  Attending Provider: Chon Sykes DO   Primary Care Physician: Mireya Larose MD  Principal Problem:Chest pain    Inpatient consult to Nephrology  Consult performed by: Roderick Burleson, NP  Consult ordered by: Ondina Renner MD  Reason for consult: ESRD        Subjective:     HPI: Haroldo Dickinson is a 48yo M w/ ESRD on HD MWF ,CAD (s/p HILLARY of RCA 2020) s/p [facility] Nov 2023 with three vessel disease deemed not a stent or surgical candidate, HTN, T2DM, CVA w/ residual R-sided deficits and RCC s/p bilateral nephrectomies who presented to the ED w/ CP. Admitted 10/28-10/29 w/ similar complaint. Patient was undergoing his OP dialysis treatment on day of presentation when he developed chest pain radiating to his L arm 2 hours into his 4 hour treatment. On arrival, labs consistent with ESRD without emergent electrolyte abnormalities, , Trop 0.75 peaking at 1.576.  Cardiology evaluated with plans for PET scan 10/31.  Nephrology has been consulted for ESRD management while IP.    Past Medical History:   Diagnosis Date    CAD (coronary artery disease), native coronary artery 11/21/2019    Cataract     Diabetes mellitus     Diabetic retinopathy     Dialysis patient     DM type 2 causing renal disease, not at goal     ESRD (end stage renal disease) started dialysis 01/2014 06/05/2014    History of colon polyps 02/10/2021    History of COVID-19 12/29/2022    Hyperparathyroidism, secondary renal 06/05/2014    Hypertension     NSTEMI (non-ST elevated myocardial infarction) 12/21/2013    Organ transplant candidate 06/05/2014    Palliative care encounter 10/29/2024    Pleural effusion 06/07/2024    Pneumonia     Post PTCA 08/26/2020    RCA HILLARY 7-25-20    Renal cell carcinoma of right kidney     Renal hypertension     Stroke        Past Surgical History:   Procedure  Laterality Date    ANGIOGRAM, CORONARY, WITH LEFT HEART CATHETERIZATION N/A 7/1/2021    Procedure: Angiogram, Coronary, with Left Heart Cath;  Surgeon: Miki Zendejas MD;  Location: [facility] CATH LAB;  Service: Cardiology;  Laterality: N/A;    ANGIOGRAM, CORONARY, WITH LEFT HEART CATHETERIZATION N/A 11/28/2023    Procedure: Angiogram, Coronary, with Left Heart Cath;  Surgeon: Noah Pendleton MD;  Location: [facility] CATH LAB;  Service: Cardiology;  Laterality: N/A;    COLONOSCOPY N/A 5/3/2017    Procedure: COLONOSCOPY;  Surgeon: Rufino Carpenter MD;  Location: [facility] ENDO (4TH FLR);  Service: Endoscopy;  Laterality: N/A;  pt states can only schedule on Wednesdays    COLONOSCOPY N/A 2/9/2021    Procedure: COLONOSCOPY;  Surgeon: Edil Wong MD;  Location: [facility] ENDO (4TH FLR);  Service: Endoscopy;  Laterality: N/A;  Dialysis MWF/ labwork day of procedure  right arm aceess  per Dr. Colin pt can hold Plavix 5 days prior see note- sm  COVID test on 2/6/21 at [facility] -     COLONOSCOPY N/A 2/10/2021    Procedure: COLONOSCOPY;  Surgeon: Robert Ayers MD;  Location: [facility] (4TH FLR);  Service: Endoscopy;  Laterality: N/A;  rescheduled due to poor bowel prep-BB  negative covid screening 2/6/21-BB  dialysis M-W-F-BB  okay to r/s for 2/9/21 and to hold Plavix per Dr. KIRAN Wong-BB  labs same day-BB    CORONARY STENT PLACEMENT N/A 7/25/2019    Procedure: INSERTION, STENT, CORONARY ARTERY;  Surgeon: Miki Zendejas MD;  Location: [facility] CATH LAB;  Service: Cardiology;  Laterality: N/A;    DIALYSIS FISTULA CREATION      FISTULOGRAM N/A 2/18/2019    Procedure: Fistulogram;  Surgeon: Yaneth De La Cruz MD;  Location: [facility] CATH LAB;  Service: Cardiology;  Laterality: N/A;    FISTULOGRAM Right 7/23/2019    Procedure: Fistulogram;  Surgeon: Oz Cordoba MD;  Location: [facility] CATH LAB;  Service: Cardiology;  Laterality: Right;    LAPAROSCOPIC ROBOT-ASSISTED SURGICAL REMOVAL OF KIDNEY USING DA JAIME XI Right 5/19/2022    Procedure: XI  ROBOTIC NEPHRECTOMY;  Surgeon: Aidan Hendricks MD;  Location: [address];  Service: Urology;  Laterality: Right;  3hrs    LAPAROSCOPIC ROBOT-ASSISTED SURGICAL REMOVAL OF KIDNEY USING DA JAIME XI Left 1/5/2023    Procedure: XI ROBOTIC NEPHRECTOMY;  Surgeon: Aidan Hendricks MD;  [facility];  Service: Urology;  Laterality: Left;  4 hrs    LEFT HEART CATHETERIZATION Left 7/25/2019    Procedure: Left heart cath;  Surgeon: Miki Zendejas MD;  Location: [facility] CATH LAB;  Service: Cardiology;  Laterality: Left;    REPAIR  1/5/2023    Procedure: REPAIR; COLOTOMY;  Surgeon: Maikel Lamb MD;  Location: [address];  Service: General;;    RETINAL LASER PROCEDURE Bilateral 2018 or 2017    Dr. Rothman    VASCULAR SURGERY         Review of patient's allergies indicates:   Allergen Reactions    No known allergies      Current Facility-Administered Medications   Medication Frequency    acetaminophen tablet 650 mg Q4H PRN    aspirin EC tablet 81 mg Daily    atorvastatin tablet 80 mg Daily    B-complex with vitamin C tablet 1 tablet Daily    carvediloL tablet 50 mg BID WM    cinacalcet tablet 60 mg Daily with breakfast    cloNIDine tablet 0.3 mg Q12H PRN    clopidogreL tablet 75 mg Daily    dextrose 10% bolus 125 mL 125 mL PRN    dextrose 10% bolus 125 mL 125 mL PRN    dextrose 10% bolus 250 mL 250 mL PRN    dextrose 10% bolus 250 mL 250 mL PRN    ezetimibe tablet 10 mg Daily    glucagon (human recombinant) injection 1 mg PRN    glucose chewable tablet 16 g PRN    glucose chewable tablet 24 g PRN    heparin 25,000 units in dextrose 5% (100 units/ml) IV bolus from bag LOW INTENSITY nomogram - [facility] PRN    heparin 25,000 units in dextrose 5% (100 units/ml) IV bolus from bag LOW INTENSITY nomogram - [facility] PRN    heparin 25,000 units in dextrose 5% 250 mL (100 units/mL) infusion LOW INTENSITY nomogram - [facility] Continuous    hydrALAZINE tablet 100 mg Q8H    isosorbide mononitrate 24 hr tablet 120 mg Daily     LIDOcaine 5 % patch 1 patch Q24H    melatonin tablet 6 mg Nightly PRN    minoxidiL tablet 10 mg BID    naloxone 0.4 mg/mL injection 0.02 mg PRN    NIFEdipine 24 hr tablet 90 mg Daily    nitroGLYCERIN SL tablet 0.4 mg Q5 Min PRN    ondansetron injection 4 mg Q8H PRN    polyethylene glycol packet 17 g Daily    ranolazine 12 hr tablet 500 mg Daily    sevelamer carbonate tablet 1,600 mg TID WM    sodium chloride 0.9% flush 10 mL Q12H PRN     Facility-Administered Medications Ordered in Other Encounters   Medication Frequency    lidocaine (PF) 10 mg/ml (1%) injection 10 mg Once     Family History       Problem Relation (Age of Onset)    Cancer Maternal Grandfather    Cataracts Mother    Diabetes Mother    Heart disease Brother    Hypertension Mother, Father, Sister, Brother    Kidney disease Brother          Tobacco Use    Smoking status: Never    Smokeless tobacco: Never   Substance and Sexual Activity    Alcohol use: Not Currently     Comment: One drink a month    Drug use: No    Sexual activity: Yes     Partners: Female     Birth control/protection: None     Review of Systems   Constitutional:  Negative for chills, diaphoresis and fever.   HENT:  Negative for congestion, sore throat and trouble swallowing.    Eyes:  Negative for visual disturbance.   Respiratory:  Negative for cough, shortness of breath and wheezing.    Cardiovascular:  Positive for chest pain. Negative for palpitations and leg swelling.   Gastrointestinal:  Negative for abdominal pain, diarrhea, nausea and vomiting.   Genitourinary:         Anuric   Musculoskeletal:  Negative for arthralgias.   Skin:  Negative for wound.   Neurological:  Negative for dizziness, light-headedness and headaches.   Psychiatric/Behavioral:  Negative for behavioral problems, confusion and decreased concentration.      Objective:     Vital Signs (Most Recent):  Temp: 97.7 °F (36.5 °C) (10/31/24 0754)  Pulse: 80 (10/31/24 0934)  Resp: 16 (10/31/24 0934)  BP: (!) 131/58  (10/31/24 0934)  SpO2: 98 % (10/31/24 0831) Vital Signs (24h Range):  Temp:  [97.7 °F (36.5 °C)-98.2 °F (36.8 °C)] 97.7 °F (36.5 °C)  Pulse:  [66-84] 80  Resp:  [14-22] 16  SpO2:  [95 %-100 %] 98 %  BP: (113-158)/() 131/58     Weight: 72.1 kg (159 lb) (10/31/24 0909)  Body mass index is 24.9 kg/m².  Body surface area is 1.85 meters squared.    I/O last 3 completed shifts:  In: 240 [P.O.:240]  Out: -      Physical Exam  Vitals and nursing note reviewed.   Constitutional:       General: He is not in acute distress.     Appearance: He is not toxic-appearing or diaphoretic.   HENT:      Head: Normocephalic and atraumatic.      Right Ear: External ear normal.      Left Ear: External ear normal.      Nose: Nose normal.      Mouth/Throat:      Pharynx: Oropharynx is clear.   Eyes:      General: No scleral icterus.  Cardiovascular:      Rate and Rhythm: Normal rate and regular rhythm.      Pulses: Normal pulses.      Heart sounds: Normal heart sounds.   Pulmonary:      Effort: Pulmonary effort is normal.      Breath sounds: Normal breath sounds. No wheezing, rhonchi or rales.   Abdominal:      General: Bowel sounds are normal.      Palpations: Abdomen is soft.      Tenderness: There is no abdominal tenderness.   Musculoskeletal:         General: No tenderness.      Cervical back: Normal range of motion and neck supple.      Right lower leg: No edema.      Left lower leg: No edema.   Lymphadenopathy:      Cervical: No cervical adenopathy.   Skin:     General: Skin is warm and dry.      Comments: L UE w/ fistula, + thrill   Neurological:      Mental Status: He is alert and oriented to person, place, and time.   Psychiatric:         Behavior: Behavior normal.          Significant Labs:  CBC:   Recent Labs   Lab 10/31/24  0543   WBC 6.29   RBC 3.70*   HGB 11.1*   HCT 35.5*      MCV 96   MCH 30.0   MCHC 31.3*     CMP:   Recent Labs   Lab 10/31/24  0543   *   CALCIUM 9.3   ALBUMIN 3.2*   PROT 7.3   *   K  4.3   CO2 20*      BUN 52*   CREATININE 10.0*   ALKPHOS 67   ALT 11   AST 17   BILITOT 0.5     Assessment/Plan:     Cardiac/Vascular  * Chest pain  -  -Trop 0.75~0.9~1.4~1.5  -Cardiology consulted, PET scan 10/31    Renal/  ESRD on hemodialysis  ESRD on iHD MWF  [facility]  4 hours  EDW:  71.5 kg  ALBRAO AVF    Plan/Recommendations:  -No emergent need for RRT today  -will plan for next session tomorrow per his OP schedule  -strict I/O's  -Renal diet when advanced  -RFP daily  -phos binders with meals    Anemia of ESRD  -hgb @ goal for ESRD  -continue trending      Roderick Beltran, NP  Nephrology  [facility] - Cardiology Stepdown

## 2024-10-31 NOTE — HOSPITAL COURSE
Admitted with chest pain during dialysis session and concern for NSTEMI.  Cardiology consulted on arrival, recommended ACS protocol along with PET stress test. PET noted two significant fixed perfusion abnormalities.  Discussed findings with Cardiology team, no intervention recommended.  Recommending medical management and up titration of ranolazine unless precluded by renal function. Will keep original dose, pt states that he picked up medication before admission. Completed 48hr ACS protocol. Follow up with cardiology

## 2024-10-31 NOTE — ASSESSMENT & PLAN NOTE
Anemia is likely due to  esrd . Most recent hemoglobin and hematocrit are listed below.  Recent Labs     10/30/24  1253 10/30/24  2301 10/31/24  0543   HGB 10.4* 10.5* 11.1*   HCT 31.2* 33.3* 35.5*     Plan  - Monitor serial CBC: Daily  - Transfuse PRBC if patient becomes hemodynamically unstable, symptomatic or H/H drops below 7/21.  - Patient has not received any PRBC transfusions to date  - Patient's anemia is currently stable

## 2024-10-31 NOTE — NURSING
"Pt taken to Cardiac stress test. Pt AAOx4. Pt stable. Pt c/o back and chest pain stating "it does not feel like pressure it feels like my muscles". Tylenol administered. Left per wheelchair with transport.  "

## 2024-10-31 NOTE — ASSESSMENT & PLAN NOTE
Pt reports acute onset CP located left side on his chest while he was getting HD. CP described as sharp and severe and with radiation to his L arm and lasted x 60 min. Of note, he was recently discharged on 10/28 for a similar presentation.     Recommendations:  - PET stress showed 2 areas of ischemia, but he is not a candidate for intervention as there is no target for revascularization. Recommend continued medical management.  - Can consider increasing dose of ranolazine to 1,000 mg BID as renal function allows  -Control demand ischemia   -Continue ACS protocol for 48 hours

## 2024-11-01 VITALS — HEART RATE: 89 BPM

## 2024-11-01 LAB
ALBUMIN SERPL BCP-MCNC: 3.1 G/DL (ref 3.5–5.2)
ALP SERPL-CCNC: 65 U/L (ref 40–150)
ALT SERPL W/O P-5'-P-CCNC: 11 U/L (ref 10–44)
ANION GAP SERPL CALC-SCNC: 14 MMOL/L (ref 8–16)
APTT PPP: 34.5 SEC (ref 21–32)
APTT PPP: 37.2 SEC (ref 21–32)
APTT PPP: 44.1 SEC (ref 21–32)
AST SERPL-CCNC: 21 U/L (ref 10–40)
BASOPHILS # BLD AUTO: 0.08 K/UL (ref 0–0.2)
BASOPHILS NFR BLD: 1 % (ref 0–1.9)
BILIRUB SERPL-MCNC: 0.5 MG/DL (ref 0.1–1)
BUN SERPL-MCNC: 67 MG/DL (ref 6–20)
CALCIUM SERPL-MCNC: 8.6 MG/DL (ref 8.7–10.5)
CHLORIDE SERPL-SCNC: 97 MMOL/L (ref 95–110)
CO2 SERPL-SCNC: 19 MMOL/L (ref 23–29)
CREAT SERPL-MCNC: 12.4 MG/DL (ref 0.5–1.4)
DIFFERENTIAL METHOD BLD: ABNORMAL
EOSINOPHIL # BLD AUTO: 0.4 K/UL (ref 0–0.5)
EOSINOPHIL NFR BLD: 4.6 % (ref 0–8)
ERYTHROCYTE [DISTWIDTH] IN BLOOD BY AUTOMATED COUNT: 14.9 % (ref 11.5–14.5)
EST. GFR  (NO RACE VARIABLE): 4.6 ML/MIN/1.73 M^2
GLUCOSE SERPL-MCNC: 102 MG/DL (ref 70–110)
HCT VFR BLD AUTO: 30.8 % (ref 40–54)
HGB BLD-MCNC: 10 G/DL (ref 14–18)
IMM GRANULOCYTES # BLD AUTO: 0.03 K/UL (ref 0–0.04)
IMM GRANULOCYTES NFR BLD AUTO: 0.4 % (ref 0–0.5)
LYMPHOCYTES # BLD AUTO: 1.5 K/UL (ref 1–4.8)
LYMPHOCYTES NFR BLD: 19 % (ref 18–48)
MAGNESIUM SERPL-MCNC: 2.1 MG/DL (ref 1.6–2.6)
MCH RBC QN AUTO: 30.2 PG (ref 27–31)
MCHC RBC AUTO-ENTMCNC: 32.5 G/DL (ref 32–36)
MCV RBC AUTO: 93 FL (ref 82–98)
MONOCYTES # BLD AUTO: 0.9 K/UL (ref 0.3–1)
MONOCYTES NFR BLD: 11.3 % (ref 4–15)
NEUTROPHILS # BLD AUTO: 5.1 K/UL (ref 1.8–7.7)
NEUTROPHILS NFR BLD: 63.7 % (ref 38–73)
NRBC BLD-RTO: 0 /100 WBC
PHOSPHATE SERPL-MCNC: 6.1 MG/DL (ref 2.7–4.5)
PLATELET # BLD AUTO: 239 K/UL (ref 150–450)
PMV BLD AUTO: 10.4 FL (ref 9.2–12.9)
POTASSIUM SERPL-SCNC: 4.5 MMOL/L (ref 3.5–5.1)
PROT SERPL-MCNC: 6.8 G/DL (ref 6–8.4)
RBC # BLD AUTO: 3.31 M/UL (ref 4.6–6.2)
SODIUM SERPL-SCNC: 130 MMOL/L (ref 136–145)
WBC # BLD AUTO: 7.99 K/UL (ref 3.9–12.7)

## 2024-11-01 PROCEDURE — 36415 COLL VENOUS BLD VENIPUNCTURE: CPT | Performed by: STUDENT IN AN ORGANIZED HEALTH CARE EDUCATION/TRAINING PROGRAM

## 2024-11-01 PROCEDURE — 80100016 HC MAINTENANCE HEMODIALYSIS

## 2024-11-01 PROCEDURE — 85025 COMPLETE CBC W/AUTO DIFF WBC: CPT | Performed by: NURSE PRACTITIONER

## 2024-11-01 PROCEDURE — 90935 HEMODIALYSIS ONE EVALUATION: CPT | Mod: ,,,

## 2024-11-01 PROCEDURE — 25000003 PHARM REV CODE 250: Performed by: STUDENT IN AN ORGANIZED HEALTH CARE EDUCATION/TRAINING PROGRAM

## 2024-11-01 PROCEDURE — 85730 THROMBOPLASTIN TIME PARTIAL: CPT | Mod: 91 | Performed by: STUDENT IN AN ORGANIZED HEALTH CARE EDUCATION/TRAINING PROGRAM

## 2024-11-01 PROCEDURE — 85730 THROMBOPLASTIN TIME PARTIAL: CPT | Performed by: NURSE PRACTITIONER

## 2024-11-01 PROCEDURE — 20600001 HC STEP DOWN PRIVATE ROOM

## 2024-11-01 PROCEDURE — 84100 ASSAY OF PHOSPHORUS: CPT | Performed by: STUDENT IN AN ORGANIZED HEALTH CARE EDUCATION/TRAINING PROGRAM

## 2024-11-01 PROCEDURE — 80053 COMPREHEN METABOLIC PANEL: CPT | Performed by: STUDENT IN AN ORGANIZED HEALTH CARE EDUCATION/TRAINING PROGRAM

## 2024-11-01 PROCEDURE — 63600175 PHARM REV CODE 636 W HCPCS: Performed by: NURSE PRACTITIONER

## 2024-11-01 PROCEDURE — 83735 ASSAY OF MAGNESIUM: CPT | Performed by: STUDENT IN AN ORGANIZED HEALTH CARE EDUCATION/TRAINING PROGRAM

## 2024-11-01 PROCEDURE — 5A1D70Z PERFORMANCE OF URINARY FILTRATION, INTERMITTENT, LESS THAN 6 HOURS PER DAY: ICD-10-PCS | Performed by: INTERNAL MEDICINE

## 2024-11-01 RX ADMIN — ISOSORBIDE MONONITRATE 120 MG: 60 TABLET, EXTENDED RELEASE ORAL at 01:11

## 2024-11-01 RX ADMIN — ASPIRIN 81 MG: 81 TABLET, COATED ORAL at 01:11

## 2024-11-01 RX ADMIN — NIFEDIPINE 90 MG: 60 TABLET, FILM COATED, EXTENDED RELEASE ORAL at 01:11

## 2024-11-01 RX ADMIN — Medication 1 TABLET: at 01:11

## 2024-11-01 RX ADMIN — CARVEDILOL 50 MG: 12.5 TABLET, FILM COATED ORAL at 06:11

## 2024-11-01 RX ADMIN — SEVELAMER CARBONATE 1600 MG: 800 TABLET, FILM COATED ORAL at 01:11

## 2024-11-01 RX ADMIN — HEPARIN SODIUM 16 UNITS/KG/HR: 10000 INJECTION, SOLUTION INTRAVENOUS at 07:11

## 2024-11-01 RX ADMIN — ATORVASTATIN CALCIUM 80 MG: 40 TABLET, FILM COATED ORAL at 01:11

## 2024-11-01 RX ADMIN — SEVELAMER CARBONATE 1600 MG: 800 TABLET, FILM COATED ORAL at 06:11

## 2024-11-01 RX ADMIN — CLOPIDOGREL BISULFATE 75 MG: 75 TABLET ORAL at 01:11

## 2024-11-01 RX ADMIN — EZETIMIBE 10 MG: 10 TABLET ORAL at 01:11

## 2024-11-01 NOTE — PROGRESS NOTES
"[facility] - Cardiology [facility] Medicine  Progress Note    Patient Name: Haroldo Dickinson  MRN: [id number]  Patient Class: IP- Inpatient   Admission Date: 10/30/2024  Length of Stay: 1 days  Attending Physician: Chon Sykes DO  Primary Care Provider: Mireya Larose MD        Subjective:     Principal Problem:NSTEMI (non-ST elevated myocardial infarction)        HPI:  Haroldo Dickinson is a 48yo M w/ ESRD on HD MWF ,CAD (s/p HILLARY of RCA 2020) s/p C Nov 2023 with three vessel disease deemed not a stent or surgical candidate, HTN, T2DM, CVA w/ residual R-sided deficits and RCC s/p bilateral nephrectomies who presented to the ED w/ CP. Admitted 10/28-10/29 w/ similar complaint. Per that course: Patient admitted to Hospital Medicine for evaluation of chest pain. Notable CAD hx w/ multiple presentations/admissions for chest pain and Type 2 NSTEMI/NSTEMI/elevated troponin. HILLARY placed to RCA in 2020. Most recently, [facility] (Nov 2023) noted 3VD. Apparently pt's anatomy is not amenable to revascularization, and has been advised per cardiology to manage medically. Troponin trend this admission flat. Continued on DAPT, statin, beta-blocker. Cardiology consulted. Suspect type 2 2/2 hypertensive urgency/emergency & volume overload; no utility for PET 2/2 [facility] findings. Patient underwent HD session with fluid removal with improvement of chest pain. His antihypertensive regimen was also increased; specifically, minoxidil increased to 10 mg b.i.d. and per Cardiology recommendations, Ranexa added; given ESRD however, will initiate 500 daily versus b.i.d.   Today, Pt notes onset of mild chest pain radiating to his left arm during dialysis; 3/10, described as "sharp".  He notes that his blood pressure was low during dialysis and they were not able to pull off as much volume as they intended.  He denies any diaphoresis, palpitations or nausea at the time.  At present, he is chest pain-free and feels 100%.  He notes he has yet to  " Ranexa and did not take minoxidil this morning.  His son however is picking up Ranexa today.  Hospital medicine asked to admit for further evaluation.        Overview/Hospital Course:  Admitted with chest pain during dialysis session and concern for NSTEMI.  Cardiology consulted on arrival, recommended ACS protocol along with PET stress test. PET noted two significant fixed perfusion abnormalities.  Discussed findings with Cardiology team, no intervention recommended.  Recommending medical management and up titration of ranolazine unless precluded by renal function.  Continuing 48hr ACS protocol    Interval History: Seen this AM at HD center. No chest pain noted overnight, continuing on on ACS protocol. Denies active chest pain    Review of Systems  Objective:     Vital Signs (Most Recent):  Temp: 97.9 °F (36.6 °C) (11/01/24 0345)  Pulse: 72 (11/01/24 1115)  Resp: 16 (11/01/24 0345)  BP: 132/63 (11/01/24 1115)  SpO2: 99 % (11/01/24 0345) Vital Signs (24h Range):  Temp:  [97.8 °F (36.6 °C)-98.2 °F (36.8 °C)] 97.9 °F (36.6 °C)  Pulse:  [71-84] 72  Resp:  [15-20] 16  SpO2:  [95 %-100 %] 99 %  BP: ()/(53-70) 132/63     Weight: 72.1 kg (159 lb)  Body mass index is 24.9 kg/m².    Intake/Output Summary (Last 24 hours) at 11/1/2024 1126  Last data filed at 10/31/2024 2045  Gross per 24 hour   Intake 702 ml   Output --   Net 702 ml         Physical Exam  Vitals and nursing note reviewed.   Constitutional:       General: He is not in acute distress.     Appearance: He is not toxic-appearing or diaphoretic.   HENT:      Head: Normocephalic and atraumatic.      Right Ear: External ear normal.      Left Ear: External ear normal.      Nose: Nose normal.      Mouth/Throat:      Pharynx: Oropharynx is clear.   Eyes:      General: No scleral icterus.  Cardiovascular:      Rate and Rhythm: Normal rate and regular rhythm.      Pulses: Normal pulses.      Heart sounds: Normal heart sounds.   Pulmonary:      Effort: Pulmonary  effort is normal.      Breath sounds: Normal breath sounds. No wheezing, rhonchi or rales.   Abdominal:      General: Bowel sounds are normal.      Palpations: Abdomen is soft.      Tenderness: There is no abdominal tenderness.   Musculoskeletal:         General: No tenderness.      Cervical back: Normal range of motion and neck supple.      Right lower leg: No edema.      Left lower leg: No edema.   Lymphadenopathy:      Cervical: No cervical adenopathy.   Skin:     General: Skin is warm and dry.      Capillary Refill: Capillary refill takes less than 2 seconds.      Comments: L UE w/ fistula, + thrill   Neurological:      Mental Status: He is alert and oriented to person, place, and time.   Psychiatric:         Behavior: Behavior normal.             Significant Labs: All pertinent labs within the past 24 hours have been reviewed.    Significant Imaging: I have reviewed all pertinent imaging results/findings within the past 24 hours.    Assessment/Plan:      * NSTEMI (non-ST elevated myocardial infarction)  Triple-vessel Coronary artery disease  Hypertensive emergency  Type II NSTEMI (demand ischemia)      Notable CAD hx w/ multiple presentations/admissions for chest pain and Type 2 NSTEMI/NSTEMI/elevated troponin. HILLARY placed to RCA in 2020. Most recently, [facility] (Nov 2023) noted 3VD. Apparently pt's anatomy is not amenable to revascularization, and has been advised per cardiology to manage medically. Presented w/ L-sided chest pain and troponin elevation. EKG w/ nonspecific ST and T-wave abnormalities.     - started on ACS protocol  - cardiac stress PET done, noting to significant fixed perfusion abnormalities   - discussed findings with Cardiology, no intervention recommended  - Cont medical management and uptitration of ranolazine unless precluded by renal function  - Continue home DAPT & statin  - Continue home BB  - BP mgmt as per HTN; optimize  - continue to monitor on tele  - SL nitro prn      Type 2 diabetes  mellitus with chronic kidney disease on chronic dialysis, without long-term current use of insulin  Creatine stable for now. BMP reviewed- noted Estimated Creatinine Clearance: 10.9 mL/min (A) (based on SCr of 7.8 mg/dL (H)). according to latest data. Based on current GFR, CKD stage is end stage.  Monitor UOP and serial BMP and adjust therapy as needed. Renally dose meds. Avoid nephrotoxic medications and procedures.    Anemia in end-stage renal disease  Anemia is likely due to  esrd . Most recent hemoglobin and hematocrit are listed below.  Recent Labs     10/30/24  1253 10/30/24  2301 10/31/24  0543   HGB 10.4* 10.5* 11.1*   HCT 31.2* 33.3* 35.5*     Plan  - Monitor serial CBC: Daily  - Transfuse PRBC if patient becomes hemodynamically unstable, symptomatic or H/H drops below 7/21.  - Patient has not received any PRBC transfusions to date  - Patient's anemia is currently stable    S/p nephrectomy  Hx of RCC  S/p bilateral nephrectomies   Hx of RCC- first of R kidney in 2022 (s/p R nephrectomy May 2022), then of L kidney in 2023 (s/p L nephrectomy in 2023).   - HD as above       Essential hypertension  Patients blood pressure range in the last 24 hours was: BP  Min: 104/54  Max: 225/105.The patient's inpatient anti-hypertensive regimen is listed below:  Current Antihypertensives  carvediloL tablet 50 mg, 2 times daily with meals, Oral  hydrALAZINE tablet 100 mg, Every 8 hours, Oral  isosorbide mononitrate 24 hr tablet 120 mg, Daily, Oral  minoxidiL tablet 10 mg, 2 times daily, Oral  NIFEdipine 24 hr tablet 90 mg, Daily, Oral  ranolazine 12 hr tablet 500 mg, 2 times daily, Oral  cloNIDine tablet 0.3 mg, Every 12 hours PRN, Oral  nitroGLYCERIN SL tablet 0.4 mg, Every 5 min PRN, Sublingual    Plan  - BP is controlled, no changes needed to their regimen  Hx of chronic poorly-controlled HTN (w/ complications of CVA & ESRD), managed w/ home regimen of minoxidil, carvedilol, nifedipine, Imdur & hydralazine (and volume  mgmt w/ HD). Pt reports medication & HD compliance. Hypertensive on arrival. Suspect uncontrolled HTN contributing to presentation.  Can consider increasing minoxidil if remains uncontrolled.    Hold BP meds prior to HD on HD days in administer after HD to prevent hypotension and less than optimal volume removal.        ESRD on hemodialysis  Creatine stable for now. BMP reviewed- noted Estimated Creatinine Clearance: 10.9 mL/min (A) (based on SCr of 7.8 mg/dL (H)). according to latest data. Based on current GFR, CKD stage is end stage.  Monitor UOP and serial BMP and adjust therapy as needed. Renally dose meds. Avoid nephrotoxic medications and procedures.    -Pt undergoes HD MWF-Nephology consulted: further decision for HD per nephology  -Renally dose medications and avoid nephrotoxic medications to preserve residual renal function   -Strict I/O's and daily weights  -Renal diet   -Continue to monitor renal function with daily labs    Hemiparesis affecting right side as late effect of cerebrovascular accident  Hx of CVA  R-sided weakness  Remote CVA hx (2013?) re: uncontrolled HTN w/ residual R-sided weakness. No acute issues/concerns on current admission.   - Continue ASA & statin  - BP control as per HTN        VTE Risk Mitigation (From admission, onward)           Ordered     heparin 25,000 units in dextrose 5% (100 units/ml) IV bolus from bag LOW INTENSITY nomogram - [facility]  As needed (PRN)        Question:  Heparin Infusion Adjustment (DO NOT MODIFY ANSWER)  Answer:  [email or web address]\[email or web address]\Images\Pharmacy\HeparinInfusions\heparin LOW INTENSITY nomogram for [facility] [id number]    10/30/24 2240     heparin 25,000 units in dextrose 5% (100 units/ml) IV bolus from bag LOW INTENSITY nomogram - [facility]  As needed (PRN)        Question:  Heparin Infusion Adjustment (DO NOT MODIFY ANSWER)  Answer:  [email or web address]\Images\Pharmacy\HeparinInfusions\heparin LOW INTENSITY nomogram for [facility] [id number]    10/30/24 2240     heparin 25,000  units in dextrose 5% 250 mL (100 units/mL) infusion LOW INTENSITY nomogram - [facility]  Continuous        Question:  Begin at (units/kg/hr)  Answer:  12    10/30/24 2319     Place sequential compression device  Until discontinued         10/30/24 1613                    Discharge Planning   QUIN: 11/1/2024     Code Status: Full Code   Is the patient medically ready for discharge?:     Reason for patient still in hospital (select all that apply): Patient trending condition                     Chon Sykes DO  Department of Hospital Medicine   [facility]

## 2024-11-01 NOTE — PLAN OF CARE
[facility] Stepdown  Initial Discharge Assessment       Primary Care Provider: Mireya Larose MD    Admission Diagnosis: CAD (coronary artery disease) [I25.10]  Chest pain [R07.9]    Admission Date: 10/30/2024  Expected Discharge Date: 11/2/2024    Transition of Care Barriers: None    Payor: HU[employer] MANAGED MEDICARE / Plan: [employer] SNP HMO PPO SPECIAL NEEDS / Product Type: Medicare Advantage /     Extended Emergency Contact Information  Primary Emergency Contact: Jil Dickinsonthia  Address: [address] [place] [address] [place]  Mobile Phone: [phone]  Relation: Mother  Secondary Emergency Contact: Ariadne Dickinson  Mobile Phone: [phone]  Relation: Sister    Discharge Plan A: Home Health  Discharge Plan B: Home      [id number] - [place], [place] - [address]  [address] [address]  Phone: [phone] Fax: [phone]      Initial Assessment (most recent)       Adult Discharge Assessment - 11/01/24 1502          Discharge Assessment    Assessment Type Discharge Planning Assessment     Confirmed/corrected address, phone number and insurance Yes     Confirmed Demographics Correct on Facesheet     Source of Information patient;health record     Communicated QUIN with patient/caregiver Date not available/Unable to determine     Reason For Admission NSTEMI     People in Home alone     Facility Arrived From: Home     Do you expect to return to your current living situation? Yes     Do you have help at home or someone to help you manage your care at home? Yes     Prior to hospitilization cognitive status: Alert/Oriented     Current cognitive status: Alert/Oriented     Walking or Climbing Stairs Difficulty no     Dressing/Bathing Difficulty no     Equipment Currently Used at Home cane, straight     Readmission within 30 days? Yes     Patient currently being followed by outpatient case management? No     Do you currently have  service(s) that help you manage your care at home? No     Do you take prescription medications? Yes     Do you have prescription coverage? Yes     Do you have any problems affording any of your prescribed medications? No     Is the patient taking medications as prescribed? yes     Who is going to help you get home at discharge? Haroldo Dickinson Jr (son)     How do you get to doctors appointments? health plan transportation     Are you on dialysis? Yes     Dialysis Name and Scheduled days [facility] MWF 9:30am     Do you take coumadin? No     Discharge Plan A Home Health     Discharge Plan B Home     DME Needed Upon Discharge  none     Discharge Plan discussed with: Patient     Transition of Care Barriers None                     Readmission Assessment (most recent)       Readmission Assessment - 11/01/24 1504          Readmission    Was this a planned readmission? No     Why were you hospitalized in the last 30 days? NSTEMI     Why were you readmitted? Alarmed about signs/symptoms     When you left the hospital where did you go? Home Alone     Tell me about what happened between when you left the hospital and the day you returned. Pt experienced chest pain during dialysis     When did you start not feeling well? That morning during dialysis                    WILIAN met with pt at bedside to discuss discharge planning.  Pt lives alone and is independent with ambulation and ADLs.  Pt gets dialysis MWF at [facility].  WILIAN spoke with [facility] who confirmed they had accepted pt but did not get to admit him due to his return to the hospital.  Pt will have transportation from his son at discharge.  Discharge Plan A and Plan B have been determined by review of patient's clinical status, future medical and therapeutic needs, and coverage/benefits for post-acute care in coordination with multidisciplinary team members.  WILIAN name and ext on whiteboard.  Will continue to follow.      Lindsay Grover,  LMSW  [facility]  [id number]

## 2024-11-01 NOTE — PLAN OF CARE
Plan of care reviewed with patient. Call light within reach, fall precautions maintained bed alarm set. Patient made aware. Nurse instructed patient to call for assistance. Patient verbalized understanding. Telemetry monitor throughout shift. NAD noted. Will continue to monitor and continue plan of care.         Problem: Adult Inpatient Plan of Care  Goal: Plan of Care Review  Outcome: Progressing  Goal: Patient-Specific Goal (Individualized)  Outcome: Progressing  Goal: Absence of Hospital-Acquired Illness or Injury  Outcome: Progressing  Goal: Optimal Comfort and Wellbeing  Outcome: Progressing  Goal: Readiness for Transition of Care  Outcome: Progressing     Problem: Diabetes Comorbidity  Goal: Blood Glucose Level Within Targeted Range  Outcome: Progressing     Problem: Fall Injury Risk  Goal: Absence of Fall and Fall-Related Injury  Outcome: Progressing     Problem: Hemodialysis  Goal: Safe, Effective Therapy Delivery  Outcome: Progressing  Goal: Effective Tissue Perfusion  Outcome: Progressing  Goal: Absence of Infection Signs and Symptoms  Outcome: Progressing

## 2024-11-01 NOTE — PROGRESS NOTES
[facility]FF HEMODIALYSIS NOTE     Patient currently on hemodialysis for removal of uremic toxins and volume.     Patient seen and evaluated on hemodialysis, tolerating treatment, see HD flowsheet for vitals and assessments.     Labs have been reviewed and the dialysate bath has been adjusted.        Assessment/Plan:     -Patient seen on HD, tolerating treatment well, w/o complaints   -UF goal of 2 L as tolerated  -Na 130, Na bath adjusted, recommend 1 L daily fluid restriction  -Renal diet, if not NPO   -Strict I/O's and daily weights  -Daily renal function panels  -Keep MAP >65 while on HD   -Hgb goal 10-11, at goal  -Phos 6.1, continue phos binders  -Will continue to follow while inpatient      Katherine Potts PA-C  Nephrology

## 2024-11-01 NOTE — NURSING
3.5 hour dialysis complete.  Blood returned.  Needles removed from RFA fistula.  Pressure held x 5 minutes.  Hemostasis achieved.  +thrill +bruit.    Net UF 2L.      Tolerated well.    Transported from [facility] to room 347 via w/c by transporter.

## 2024-11-01 NOTE — SUBJECTIVE & OBJECTIVE
Interval History: Seen this AM at HD center. No chest pain noted overnight, continuing on on ACS protocol. Denies active chest pain    Review of Systems  Objective:     Vital Signs (Most Recent):  Temp: 97.9 °F (36.6 °C) (11/01/24 0345)  Pulse: 72 (11/01/24 1115)  Resp: 16 (11/01/24 0345)  BP: 132/63 (11/01/24 1115)  SpO2: 99 % (11/01/24 0345) Vital Signs (24h Range):  Temp:  [97.8 °F (36.6 °C)-98.2 °F (36.8 °C)] 97.9 °F (36.6 °C)  Pulse:  [71-84] 72  Resp:  [15-20] 16  SpO2:  [95 %-100 %] 99 %  BP: ()/(53-70) 132/63     Weight: 72.1 kg (159 lb)  Body mass index is 24.9 kg/m².    Intake/Output Summary (Last 24 hours) at 11/1/2024 1126  Last data filed at 10/31/2024 2045  Gross per 24 hour   Intake 702 ml   Output --   Net 702 ml         Physical Exam  Vitals and nursing note reviewed.   Constitutional:       General: He is not in acute distress.     Appearance: He is not toxic-appearing or diaphoretic.   HENT:      Head: Normocephalic and atraumatic.      Right Ear: External ear normal.      Left Ear: External ear normal.      Nose: Nose normal.      Mouth/Throat:      Pharynx: Oropharynx is clear.   Eyes:      General: No scleral icterus.  Cardiovascular:      Rate and Rhythm: Normal rate and regular rhythm.      Pulses: Normal pulses.      Heart sounds: Normal heart sounds.   Pulmonary:      Effort: Pulmonary effort is normal.      Breath sounds: Normal breath sounds. No wheezing, rhonchi or rales.   Abdominal:      General: Bowel sounds are normal.      Palpations: Abdomen is soft.      Tenderness: There is no abdominal tenderness.   Musculoskeletal:         General: No tenderness.      Cervical back: Normal range of motion and neck supple.      Right lower leg: No edema.      Left lower leg: No edema.   Lymphadenopathy:      Cervical: No cervical adenopathy.   Skin:     General: Skin is warm and dry.      Capillary Refill: Capillary refill takes less than 2 seconds.      Comments: L UE w/ fistula, + thrill    Neurological:      Mental Status: He is alert and oriented to person, place, and time.   Psychiatric:         Behavior: Behavior normal.             Significant Labs: All pertinent labs within the past 24 hours have been reviewed.    Significant Imaging: I have reviewed all pertinent imaging results/findings within the past 24 hours.

## 2024-11-02 VITALS
HEART RATE: 75 BPM | RESPIRATION RATE: 20 BRPM | BODY MASS INDEX: 24.96 KG/M2 | TEMPERATURE: 98 F | OXYGEN SATURATION: 97 % | HEIGHT: 67 IN | WEIGHT: 159 LBS | SYSTOLIC BLOOD PRESSURE: 126 MMHG | DIASTOLIC BLOOD PRESSURE: 59 MMHG

## 2024-11-02 LAB
ALBUMIN SERPL BCP-MCNC: 3 G/DL (ref 3.5–5.2)
ALP SERPL-CCNC: 64 U/L (ref 40–150)
ALT SERPL W/O P-5'-P-CCNC: 11 U/L (ref 10–44)
ANION GAP SERPL CALC-SCNC: 11 MMOL/L (ref 8–16)
APTT PPP: 36.6 SEC (ref 21–32)
APTT PPP: 37.5 SEC (ref 21–32)
APTT PPP: 47.8 SEC (ref 21–32)
AST SERPL-CCNC: 20 U/L (ref 10–40)
BASOPHILS # BLD AUTO: 0.07 K/UL (ref 0–0.2)
BASOPHILS # BLD AUTO: 0.1 K/UL (ref 0–0.2)
BASOPHILS NFR BLD: 1 % (ref 0–1.9)
BASOPHILS NFR BLD: 1.4 % (ref 0–1.9)
BILIRUB SERPL-MCNC: 0.6 MG/DL (ref 0.1–1)
BUN SERPL-MCNC: 43 MG/DL (ref 6–20)
CALCIUM SERPL-MCNC: 9.3 MG/DL (ref 8.7–10.5)
CHLORIDE SERPL-SCNC: 97 MMOL/L (ref 95–110)
CO2 SERPL-SCNC: 24 MMOL/L (ref 23–29)
CREAT SERPL-MCNC: 9.2 MG/DL (ref 0.5–1.4)
DIFFERENTIAL METHOD BLD: ABNORMAL
DIFFERENTIAL METHOD BLD: ABNORMAL
EOSINOPHIL # BLD AUTO: 0.4 K/UL (ref 0–0.5)
EOSINOPHIL # BLD AUTO: 0.4 K/UL (ref 0–0.5)
EOSINOPHIL NFR BLD: 5.6 % (ref 0–8)
EOSINOPHIL NFR BLD: 6 % (ref 0–8)
ERYTHROCYTE [DISTWIDTH] IN BLOOD BY AUTOMATED COUNT: 15.2 % (ref 11.5–14.5)
ERYTHROCYTE [DISTWIDTH] IN BLOOD BY AUTOMATED COUNT: 15.2 % (ref 11.5–14.5)
EST. GFR  (NO RACE VARIABLE): 6.5 ML/MIN/1.73 M^2
GLUCOSE SERPL-MCNC: 87 MG/DL (ref 70–110)
HCT VFR BLD AUTO: 29 % (ref 40–54)
HCT VFR BLD AUTO: 29.2 % (ref 40–54)
HGB BLD-MCNC: 9.3 G/DL (ref 14–18)
HGB BLD-MCNC: 9.3 G/DL (ref 14–18)
IMM GRANULOCYTES # BLD AUTO: 0.02 K/UL (ref 0–0.04)
IMM GRANULOCYTES # BLD AUTO: 0.02 K/UL (ref 0–0.04)
IMM GRANULOCYTES NFR BLD AUTO: 0.3 % (ref 0–0.5)
IMM GRANULOCYTES NFR BLD AUTO: 0.3 % (ref 0–0.5)
LYMPHOCYTES # BLD AUTO: 1.5 K/UL (ref 1–4.8)
LYMPHOCYTES # BLD AUTO: 1.6 K/UL (ref 1–4.8)
LYMPHOCYTES NFR BLD: 21.5 % (ref 18–48)
LYMPHOCYTES NFR BLD: 22.6 % (ref 18–48)
MAGNESIUM SERPL-MCNC: 2 MG/DL (ref 1.6–2.6)
MCH RBC QN AUTO: 30 PG (ref 27–31)
MCH RBC QN AUTO: 30.1 PG (ref 27–31)
MCHC RBC AUTO-ENTMCNC: 31.8 G/DL (ref 32–36)
MCHC RBC AUTO-ENTMCNC: 32.1 G/DL (ref 32–36)
MCV RBC AUTO: 94 FL (ref 82–98)
MCV RBC AUTO: 95 FL (ref 82–98)
MONOCYTES # BLD AUTO: 1 K/UL (ref 0.3–1)
MONOCYTES # BLD AUTO: 1.1 K/UL (ref 0.3–1)
MONOCYTES NFR BLD: 13.7 % (ref 4–15)
MONOCYTES NFR BLD: 15.5 % (ref 4–15)
NEUTROPHILS # BLD AUTO: 4 K/UL (ref 1.8–7.7)
NEUTROPHILS # BLD AUTO: 4 K/UL (ref 1.8–7.7)
NEUTROPHILS NFR BLD: 56 % (ref 38–73)
NEUTROPHILS NFR BLD: 56.1 % (ref 38–73)
NRBC BLD-RTO: 0 /100 WBC
NRBC BLD-RTO: 0 /100 WBC
PHOSPHATE SERPL-MCNC: 5.3 MG/DL (ref 2.7–4.5)
PLATELET # BLD AUTO: 204 K/UL (ref 150–450)
PLATELET # BLD AUTO: 210 K/UL (ref 150–450)
PMV BLD AUTO: 10.1 FL (ref 9.2–12.9)
PMV BLD AUTO: 10.1 FL (ref 9.2–12.9)
POTASSIUM SERPL-SCNC: 4.5 MMOL/L (ref 3.5–5.1)
PROT SERPL-MCNC: 6.6 G/DL (ref 6–8.4)
RBC # BLD AUTO: 3.09 M/UL (ref 4.6–6.2)
RBC # BLD AUTO: 3.1 M/UL (ref 4.6–6.2)
SODIUM SERPL-SCNC: 132 MMOL/L (ref 136–145)
WBC # BLD AUTO: 7.16 K/UL (ref 3.9–12.7)
WBC # BLD AUTO: 7.16 K/UL (ref 3.9–12.7)

## 2024-11-02 PROCEDURE — 85025 COMPLETE CBC W/AUTO DIFF WBC: CPT | Mod: 91 | Performed by: STUDENT IN AN ORGANIZED HEALTH CARE EDUCATION/TRAINING PROGRAM

## 2024-11-02 PROCEDURE — 83735 ASSAY OF MAGNESIUM: CPT | Performed by: STUDENT IN AN ORGANIZED HEALTH CARE EDUCATION/TRAINING PROGRAM

## 2024-11-02 PROCEDURE — 85730 THROMBOPLASTIN TIME PARTIAL: CPT | Performed by: STUDENT IN AN ORGANIZED HEALTH CARE EDUCATION/TRAINING PROGRAM

## 2024-11-02 PROCEDURE — 85730 THROMBOPLASTIN TIME PARTIAL: CPT | Mod: 91 | Performed by: NURSE PRACTITIONER

## 2024-11-02 PROCEDURE — 80053 COMPREHEN METABOLIC PANEL: CPT | Performed by: STUDENT IN AN ORGANIZED HEALTH CARE EDUCATION/TRAINING PROGRAM

## 2024-11-02 PROCEDURE — 63600175 PHARM REV CODE 636 W HCPCS: Performed by: STUDENT IN AN ORGANIZED HEALTH CARE EDUCATION/TRAINING PROGRAM

## 2024-11-02 PROCEDURE — 36415 COLL VENOUS BLD VENIPUNCTURE: CPT | Performed by: STUDENT IN AN ORGANIZED HEALTH CARE EDUCATION/TRAINING PROGRAM

## 2024-11-02 PROCEDURE — 25000003 PHARM REV CODE 250: Performed by: STUDENT IN AN ORGANIZED HEALTH CARE EDUCATION/TRAINING PROGRAM

## 2024-11-02 PROCEDURE — 84100 ASSAY OF PHOSPHORUS: CPT | Performed by: STUDENT IN AN ORGANIZED HEALTH CARE EDUCATION/TRAINING PROGRAM

## 2024-11-02 PROCEDURE — 85025 COMPLETE CBC W/AUTO DIFF WBC: CPT | Performed by: NURSE PRACTITIONER

## 2024-11-02 PROCEDURE — 85730 THROMBOPLASTIN TIME PARTIAL: CPT | Mod: 91 | Performed by: STUDENT IN AN ORGANIZED HEALTH CARE EDUCATION/TRAINING PROGRAM

## 2024-11-02 RX ORDER — EZETIMIBE 10 MG/1
10 TABLET ORAL DAILY
Qty: 90 TABLET | Refills: 3 | Status: SHIPPED | OUTPATIENT
Start: 2024-11-02 | End: 2025-11-02

## 2024-11-02 RX ADMIN — NIFEDIPINE 90 MG: 60 TABLET, FILM COATED, EXTENDED RELEASE ORAL at 08:11

## 2024-11-02 RX ADMIN — Medication 1 TABLET: at 08:11

## 2024-11-02 RX ADMIN — ATORVASTATIN CALCIUM 80 MG: 40 TABLET, FILM COATED ORAL at 08:11

## 2024-11-02 RX ADMIN — CLOPIDOGREL BISULFATE 75 MG: 75 TABLET ORAL at 08:11

## 2024-11-02 RX ADMIN — CINACALCET HYDROCHLORIDE 60 MG: 30 TABLET, FILM COATED ORAL at 08:11

## 2024-11-02 RX ADMIN — CARVEDILOL 50 MG: 12.5 TABLET, FILM COATED ORAL at 08:11

## 2024-11-02 RX ADMIN — RANOLAZINE 500 MG: 500 TABLET, EXTENDED RELEASE ORAL at 08:11

## 2024-11-02 RX ADMIN — SEVELAMER CARBONATE 1600 MG: 800 TABLET, FILM COATED ORAL at 08:11

## 2024-11-02 RX ADMIN — ISOSORBIDE MONONITRATE 120 MG: 60 TABLET, EXTENDED RELEASE ORAL at 08:11

## 2024-11-02 RX ADMIN — HYDRALAZINE HYDROCHLORIDE 100 MG: 50 TABLET ORAL at 06:11

## 2024-11-02 RX ADMIN — ASPIRIN 81 MG: 81 TABLET, COATED ORAL at 08:11

## 2024-11-02 RX ADMIN — EZETIMIBE 10 MG: 10 TABLET ORAL at 08:11

## 2024-11-02 NOTE — PLAN OF CARE
[facility]  Discharge Final Note    Primary Care Provider: Mireya Larose MD    Expected Discharge Date: 11/2/2024    Final Discharge Note (most recent)       Final Note - 11/02/24 1500          Final Note    Assessment Type Final Discharge Note (P)      Anticipated Discharge Disposition Home-Health Care Svc (P)    [facility] (Resume)    Hospital Resources/Appts/Education Provided Provided patient/caregiver with written discharge plan information;Provided education on problems/symptoms using teachback;Appointments scheduled and added to AVS (P)         Post-Acute Status    Post-Acute Authorization Home Health (P)      Home Health Status Set-up Complete/Auth obtained (P)    [facility] (Resume)    Coverage [employer] (P)      Discharge Delays None known at this time (P)                      Important Message from Medicare             Pt. discharged home with [facility]. Pt waiting for scheduling of Heart Failure Clinic & Cardiology appts.     Morgan Mitchell LMSW

## 2024-11-02 NOTE — PLAN OF CARE
[facility]    HOME HEALTH ORDERS  FACE TO FACE ENCOUNTER    Patient Name: Haroldo Dickinson  YOB: 1975    PCP: Mireya Larose MD   PCP Address: [address] / [place] [address]  PCP Phone Number: [phone]  PCP Fax: [phone]    Discharging Team(s):    [facility] HOSP MED C  [facility] NEPHROLOGY DIALYSIS    Encounter Date: 11/02/2024    Admit to Home Health    Diagnoses:  Active Hospital Problems    Diagnosis  POA    Triple vessel coronary artery disease [I25.10]  Yes    H/o renal cell carcinoma of left kidney [C64.2]  Yes    Type 2 diabetes mellitus with chronic kidney disease on chronic dialysis, without long-term current use of insulin [E11.22, N18.6, Z99.2]  Not Applicable    Anemia in end-stage renal disease [N18.6, D63.1]  Yes    S/p nephrectomy [Z90.5]  Not Applicable    Essential hypertension [I10]  Yes     Chronic    ESRD on hemodialysis [N18.6, Z99.2]  Not Applicable    Hemiparesis affecting right side as late effect of cerebrovascular accident [I69.351]  Not Applicable      Resolved Hospital Problems    Diagnosis Date Resolved POA    *NSTEMI (non-ST elevated myocardial infarction) [I21.4] 11/02/2024 Yes     Priority: 1 - High     Chronic       Future Appointments   Date Time Provider Department Center   11/5/2024  2:40 PM Luke Gonzalez Jr., MD [facility] CARDIO [place]   11/15/2024  9:40 AM Mireya Larose MD [facility]           I have seen and examined this patient face to face today. My clinical findings that support the need for the home health skilled services and home bound status are the following:  Weakness/numbness causing balance and gait disturbance due to Heart Failure making it taxing to leave home.    Allergies:  Review of patient's allergies indicates:   Allergen Reactions    No known allergies        Diet: renal diet and fluid restriction: 1.5L    Activities: activity as tolerated    Nursing:   SN to complete comprehensive assessment including  routine vital signs. Instruct on disease process and s/s of complications to report to MD. Review/verify medication list sent home with the patient at time of discharge  and instruct patient/caregiver as needed. Frequency may be adjusted depending on start of care date.    Notify MD if SBP > 160 or < 90; DBP > 90 or < 50; HR > 120 or < 50; Temp > 101; Other:         CONSULTS:    Physical Therapy to evaluate and treat. Evaluate for home safety and equipment needs; Establish/upgrade home exercise program. Perform / instruct on therapeutic exercises, gait training, transfer training, and Range of Motion.  Occupational Therapy to evaluate and treat. Evaluate home environment for safety and equipment needs. Perform/Instruct on transfers, ADL training, ROM, and therapeutic exercises.   to evaluate for community resources/long-range planning.  Aide to provide assistance with personal care, ADLs, and vital signs.    MISCELLANEOUS CARE:      WOUND CARE ORDERS        Medications: Review discharge medications with patient and family and provide education.      Current Discharge Medication List        CONTINUE these medications which have CHANGED    Details   ezetimibe (ZETIA) 10 mg tablet Take 1 tablet (10 mg total) by mouth once daily.  Qty: 90 tablet, Refills: 3    Associated Diagnoses: Mixed hyperlipidemia           CONTINUE these medications which have NOT CHANGED    Details   aspirin (ECOTRIN) 81 MG EC tablet Take 1 tablet (81 mg total) by mouth once daily.  Qty: 30 tablet, Refills: 11      atorvastatin (LIPITOR) 80 MG tablet Take 1 tablet (80 mg total) by mouth once daily.  Qty: 90 tablet, Refills: 1      carvediloL (COREG) 25 MG tablet Take 2 tablets (50 mg total) by mouth 2 (two) times daily with meals.  Qty: 360 tablet, Refills: 3    Comments: .      clopidogreL (PLAVIX) 75 mg tablet Take 1 tablet (75 mg total) by mouth once daily.  Qty: 90 tablet, Refills: 3      epoetin philip (PROCRIT) 4,000 unit/mL  injection Inject 1.93 mLs (7,720 Units total) into the skin every Mon, Wed, Fri.    Associated Diagnoses: ESRD on hemodialysis      hydrALAZINE (APRESOLINE) 100 MG tablet Take 1 tablet (100 mg total) by mouth every 8 (eight) hours.  Qty: 90 tablet, Refills: 11    Comments: .      isosorbide mononitrate (IMDUR) 120 MG 24 hr tablet Take 1 tablet (120 mg total) by mouth once daily.  Qty: 90 tablet, Refills: 1      methoxy peg-epoetin beta (MIRCERA INJ) 150 mcg.      minoxidiL (LONITEN) 10 MG Tab Take 1 tablet (10 mg total) by mouth 2 (two) times daily.  Qty: 60 tablet, Refills: 1    Comments: .      NIFEdipine (PROCARDIA-XL) 90 MG (OSM) 24 hr tablet Take 1 tablet (90 mg total) by mouth once daily.  Qty: 90 tablet, Refills: 3    Comments: .  Associated Diagnoses: Essential hypertension      nitroGLYCERIN (NITROSTAT) 0.4 MG SL tablet Place 1 tablet (0.4 mg total) under the tongue every 5 (five) minutes as needed for Chest pain.  Qty: 25 tablet, Refills: 1      polyethylene glycol (GLYCOLAX) 17 gram/dose powder Take 17 g by mouth daily as needed for Constipation.  Qty: 238 g, Refills: 0      ranolazine (RANEXA) 500 MG Tb12 Take 1 tablet (500 mg total) by mouth Daily.  Qty: 30 tablet, Refills: 1      SENSIPAR 60 mg Tab Take 60 mg by mouth daily with breakfast.      sevelamer carbonate (RENVELA) 800 mg Tab Take 2 tablets (1,600 mg total) by mouth 3 (three) times daily with meals.  Qty: 180 tablet, Refills: 11      vit B complex-C-folic ac-zinc (RENAPLEX) 800 mcg- 12.5 mg Tab Take 1 tablet by mouth.      XPHOZAH 20 mg Tab Take 20 mg by mouth 2 (two) times a day.             I certify that this patient is confined to his home and needs intermittent skilled nursing care, physical therapy, and occupational therapy.

## 2024-11-02 NOTE — DISCHARGE SUMMARY
"Kofi Evans - Cardiology [facility] Medicine  Discharge Summary      Patient Name: Haroldo Dickinson  MRN: [id number]  SADIE: [id number]  Patient Class: IP- Inpatient  Admission Date: 10/30/2024  Hospital Length of Stay: 2 days  Discharge Date and Time:  11/02/2024 11:26 AM  Attending Physician: Chon Sykes DO   Discharging Provider: Chon Sykes DO  Primary Care Provider: Mireya Larose MD  [facility] Medicine Team: [facility] MED  Chon Sykes DO  Primary Care Team: [facility]    HPI:   Haroldo Dickinson is a 50yo M w/ ESRD on HD MWF ,CAD (s/p HILLARY of RCA 2020) s/p C Nov 2023 with three vessel disease deemed not a stent or surgical candidate, HTN, T2DM, CVA w/ residual R-sided deficits and RCC s/p bilateral nephrectomies who presented to the ED w/ CP. Admitted 10/28-10/29 w/ similar complaint. Per that course: Patient admitted to Hospital Medicine for evaluation of chest pain. Notable CAD hx w/ multiple presentations/admissions for chest pain and Type 2 NSTEMI/NSTEMI/elevated troponin. HILLARY placed to RCA in 2020. Most recently, C (Nov 2023) noted 3VD. Apparently pt's anatomy is not amenable to revascularization, and has been advised per cardiology to manage medically. Troponin trend this admission flat. Continued on DAPT, statin, beta-blocker. Cardiology consulted. Suspect type 2 2/2 hypertensive urgency/emergency & volume overload; no utility for PET 2/2 C findings. Patient underwent HD session with fluid removal with improvement of chest pain. His antihypertensive regimen was also increased; specifically, minoxidil increased to 10 mg b.i.d. and per Cardiology recommendations, Ranexa added; given ESRD however, will initiate 500 daily versus b.i.d.   Today, Pt notes onset of mild chest pain radiating to his left arm during dialysis; 3/10, described as "sharp".  He notes that his blood pressure was low during dialysis and they were not able to pull off as much volume as they intended.  He denies any " diaphoresis, palpitations or nausea at the time.  At present, he is chest pain-free and feels 100%.  He notes he has yet to  Ranexa and did not take minoxidil this morning.  His son however is picking up Ranexa today.  Hospital medicine asked to admit for further evaluation.        * No surgery found *      Hospital Course:   Admitted with chest pain during dialysis session and concern for NSTEMI.  Cardiology consulted on arrival, recommended ACS protocol along with PET stress test. PET noted two significant fixed perfusion abnormalities.  Discussed findings with Cardiology team, no intervention recommended.  Recommending medical management and up titration of ranolazine unless precluded by renal function. Will keep original dose, pt states that he picked up medication before admission. Completed 48hr ACS protocol. Follow up with cardiology     Physical Exam  Gen: in NAD, appears stated age  Neuro: AAOx3, motor, sensory, and strength grossly intact BL  HEENT: NTNC, EOMI, PERRL, MMM  CVS: RRR, no m/r/g; S1/S2 auscultated with no S3 or S4; capillary refill < 2 sec  Resp: lungs CTAB, no w/r/r; no belabored breathing or accessory muscle use appreciated   Abd: BS+ in all 4 quadrants; NTND, soft to palpation; no organomegaly appreciated   Extrem: pulses full, equal, and regular over all 4 extremities; no UE or LE edema BL    Goals of Care Treatment Preferences:  Code Status: Full Code    Health care agent: Ariadne Dickinson (sister)  Health care agent number: [phone]          What is most important right now is to focus on extending life as long as possible, even it it means sacrificing quality, curative/life-prolongation (regardless of treatment burdens).  Accordingly, we have decided that the best plan to meet the patient's goals includes continuing with treatment.         Consults:   Consults (From admission, onward)          Status Ordering Provider     Inpatient consult to Nephrology  Once        Provider:   (Not yet assigned)    Completed MICHAEL MCFARLAND     Inpatient consult to Cardiology  Once        Provider:  (Not yet assigned)    Completed MICHAEL MCFARLAND            No new Assessment & Plan notes have been filed under this hospital service since the last note was generated.  Service: Hospital Medicine    Final Active Diagnoses:    Diagnosis Date Noted POA    Triple vessel coronary artery disease [I25.10] 09/03/2024 Yes    H/o renal cell carcinoma of left kidney [C64.2] 04/04/2024 Yes    Type 2 diabetes mellitus with chronic kidney disease on chronic dialysis, without long-term current use of insulin [E11.22, N18.6, Z99.2] 03/10/2024 Not Applicable    Anemia in end-stage renal disease [N18.6, D63.1] 06/14/2022 Yes    S/p nephrectomy [Z90.5] 05/20/2022 Not Applicable    Essential hypertension [I10] 11/21/2019 Yes     Chronic    ESRD on hemodialysis [N18.6, Z99.2] 07/23/2019 Not Applicable    Hemiparesis affecting right side as late effect of cerebrovascular accident [I69.351] 02/15/2016 Not Applicable      Problems Resolved During this Admission:    Diagnosis Date Noted Date Resolved POA    PRINCIPAL PROBLEM:  NSTEMI (non-ST elevated myocardial infarction) [I21.4] 12/21/2013 11/02/2024 Yes     Chronic       Discharged Condition: fair    Disposition:     Follow Up:    Patient Instructions:      ACCEPT - Ambulatory referral/consult to [facility]   Standing Status: Future   Referral Priority: Routine Referral Type: Consultation   Referral Reason: Specialty Services Required   Requested Specialty: Cardiology   Number of Visits Requested: 1     Ambulatory referral/consult to Cardiology   Standing Status: Future   Referral Priority: Routine Referral Type: Consultation   Referral Reason: Specialty Services Required   Requested Specialty: Cardiology   Number of Visits Requested: 1     Notify your health care provider if you experience any of the following:     Notify your health care provider if you  experience any of the following:  increased confusion or weakness     Notify your health care provider if you experience any of the following:  persistent dizziness, light-headedness, or visual disturbances     Notify your health care provider if you experience any of the following:  worsening rash     Notify your health care provider if you experience any of the following:  severe persistent headache     Notify your health care provider if you experience any of the following:  difficulty breathing or increased cough     Notify your health care provider if you experience any of the following:  redness, tenderness, or signs of infection (pain, swelling, redness, odor or green/yellow discharge around incision site)     Notify your health care provider if you experience any of the following:  severe uncontrolled pain     Notify your health care provider if you experience any of the following:  persistent nausea and vomiting or diarrhea     Notify your health care provider if you experience any of the following:  temperature >100.4       Significant Diagnostic Studies: N/A    Pending Diagnostic Studies:       Procedure Component Value Units Date/Time    CBC with Automated Differential [[id number]] Collected: 10/31/24 0543    Order Status: Sent Lab Status: No result     Specimen: Blood            Medications:  Reconciled Home Medications:      Medication List        CONTINUE taking these medications      aspirin 81 MG EC tablet  Commonly known as: ECOTRIN  Take 1 tablet (81 mg total) by mouth once daily.     atorvastatin 80 MG tablet  Commonly known as: LIPITOR  Take 1 tablet (80 mg total) by mouth once daily.     carvediloL 25 MG tablet  Commonly known as: COREG  Take 2 tablets (50 mg total) by mouth 2 (two) times daily with meals.     clopidogreL 75 mg tablet  Commonly known as: PLAVIX  Take 1 tablet (75 mg total) by mouth once daily.     epoetin philip 4,000 unit/mL injection  Commonly known as: PROCRIT  Inject 1.93 mLs  (7,720 Units total) into the skin every Mon, Wed, Fri.     ezetimibe 10 mg tablet  Commonly known as: ZETIA  Take 1 tablet (10 mg total) by mouth once daily.     hydrALAZINE 100 MG tablet  Commonly known as: APRESOLINE  Take 1 tablet (100 mg total) by mouth every 8 (eight) hours.     isosorbide mononitrate 120 MG 24 hr tablet  Commonly known as: IMDUR  Take 1 tablet (120 mg total) by mouth once daily.     minoxidiL 10 MG Tab  Commonly known as: LONITEN  Take 1 tablet (10 mg total) by mouth 2 (two) times daily.     MIRCERA INJ  150 mcg.     NIFEdipine 90 MG (OSM) 24 hr tablet  Commonly known as: PROCARDIA-XL  Take 1 tablet (90 mg total) by mouth once daily.     nitroGLYCERIN 0.4 MG SL tablet  Commonly known as: NITROSTAT  Place 1 tablet (0.4 mg total) under the tongue every 5 (five) minutes as needed for Chest pain.     polyethylene glycol 17 gram/dose powder  Commonly known as: GLYCOLAX  Take 17 g by mouth daily as needed for Constipation.     ranolazine 500 MG Tb12  Commonly known as: RANEXA  Take 1 tablet (500 mg total) by mouth Daily.     RENAPLEX 800 mcg- 12.5 mg Tab  Generic drug: vit B complex-C-folic ac-zinc  Take 1 tablet by mouth.     SENSIPAR 60 mg Tab  Generic drug: cinacalcet  Take 60 mg by mouth daily with breakfast.     sevelamer carbonate 800 mg Tab  Commonly known as: RENVELA  Take 2 tablets (1,600 mg total) by mouth 3 (three) times daily with meals.     XPHOZAH 20 mg Tab  Generic drug: tenapanor  Take 20 mg by mouth 2 (two) times a day.              Indwelling Lines/Drains at time of discharge:   Lines/Drains/Airways       Drain  Duration                  Hemodialysis AV Fistula Right forearm -- days                    Time spent on the discharge of patient: 35 minutes     Pt deemed appropriate for discharge. Plan discussed with pt, who was agreeable and amenable; medications were discussed and reviewed, outpatient follow-up scheduled, ER precautions were given, all questions were answered to the  pt's satisfaction, and Mr Dickinson was subsequently discharged.      Chon Sykes DO  Department of Hospital Medicine  [facility] - Cardiology Stepdown

## 2024-11-02 NOTE — PLAN OF CARE
Pt given discharge instruction, medication reviewed with the patient, follow up appointments reviewed. All questions and concerns addressed. Pt verbalized understanding. Rx delivered to bedside. IV, telemetry removed. Discharge paperwork given to patient. Pt with belongings in the room waiting for transport.         Problem: Adult Inpatient Plan of Care  Goal: Plan of Care Review  Outcome: Met  Goal: Patient-Specific Goal (Individualized)  Outcome: Met  Goal: Absence of Hospital-Acquired Illness or Injury  Outcome: Met  Goal: Optimal Comfort and Wellbeing  Outcome: Met  Goal: Readiness for Transition of Care  Outcome: Met     Problem: Diabetes Comorbidity  Goal: Blood Glucose Level Within Targeted Range  Outcome: Met     Problem: Fall Injury Risk  Goal: Absence of Fall and Fall-Related Injury  Outcome: Met     Problem: Hemodialysis  Goal: Safe, Effective Therapy Delivery  Outcome: Met  Goal: Effective Tissue Perfusion  Outcome: Met  Goal: Absence of Infection Signs and Symptoms  Outcome: Met

## 2024-11-02 NOTE — PLAN OF CARE
Problem: Adult Inpatient Plan of Care  Goal: Plan of Care Review  Outcome: Progressing  Goal: Patient-Specific Goal (Individualized)  Outcome: Progressing  Goal: Absence of Hospital-Acquired Illness or Injury  Outcome: Progressing  Goal: Optimal Comfort and Wellbeing  Outcome: Progressing  Goal: Readiness for Transition of Care  Outcome: Progressing     Problem: Diabetes Comorbidity  Goal: Blood Glucose Level Within Targeted Range  Outcome: Progressing     Problem: Fall Injury Risk  Goal: Absence of Fall and Fall-Related Injury  Outcome: Progressing     Problem: Hemodialysis  Goal: Safe, Effective Therapy Delivery  Outcome: Progressing  Goal: Effective Tissue Perfusion  Outcome: Progressing  Goal: Absence of Infection Signs and Symptoms  Outcome: Progressing

## 2024-11-04 ENCOUNTER — TELEPHONE (OUTPATIENT)
Dept: CARDIOLOGY | Facility: CLINIC | Age: 49
End: 2024-11-04
Payer: MEDICARE

## 2024-11-04 NOTE — TELEPHONE ENCOUNTER
----- Message from Rosetta sent at 11/4/2024  8:17 AM CST -----  Regarding: appt access  Pt needs to r/s his hosp f/u appt that is for tomorrow.  He was d/c on Saturday and needs to give his transportation company a 3 day notice.  He can be reached at [phone].    Thank you

## 2024-11-05 ENCOUNTER — TELEPHONE (OUTPATIENT)
Dept: FAMILY MEDICINE | Facility: CLINIC | Age: 49
End: 2024-11-05
Payer: MEDICARE

## 2024-11-05 DIAGNOSIS — Z99.2 END STAGE RENAL FAILURE ON DIALYSIS: ICD-10-CM

## 2024-11-05 DIAGNOSIS — N18.6 END STAGE RENAL FAILURE ON DIALYSIS: ICD-10-CM

## 2024-11-05 DIAGNOSIS — I69.351 HEMIPARESIS AFFECTING RIGHT SIDE AS LATE EFFECT OF CEREBROVASCULAR ACCIDENT: Primary | ICD-10-CM

## 2024-11-05 NOTE — TELEPHONE ENCOUNTER
----- Message from Marjorie sent at 11/5/2024 11:46 AM CST -----  Regarding: patient call back  Type: Patient Call Back    Who called: Montse with [facility]     What is the request in detail: would like to request an order for a      Can the clinic reply by [facility]? No     Would the patient rather a call back or a response via [facility]? Call     Best call back number: [phone]

## 2024-11-12 ENCOUNTER — OFFICE VISIT (OUTPATIENT)
Dept: CARDIOLOGY | Facility: CLINIC | Age: 49
End: 2024-11-12
Payer: MEDICARE

## 2024-11-12 VITALS
HEART RATE: 63 BPM | BODY MASS INDEX: 24.36 KG/M2 | DIASTOLIC BLOOD PRESSURE: 85 MMHG | HEIGHT: 67 IN | WEIGHT: 155.19 LBS | SYSTOLIC BLOOD PRESSURE: 142 MMHG

## 2024-11-12 DIAGNOSIS — I25.10 TRIPLE VESSEL CORONARY ARTERY DISEASE: Primary | ICD-10-CM

## 2024-11-12 DIAGNOSIS — I10 ESSENTIAL HYPERTENSION: ICD-10-CM

## 2024-11-12 DIAGNOSIS — I21.4 NSTEMI (NON-ST ELEVATED MYOCARDIAL INFARCTION): ICD-10-CM

## 2024-11-12 DIAGNOSIS — I50.32 CHRONIC DIASTOLIC HEART FAILURE: ICD-10-CM

## 2024-11-12 PROCEDURE — 99999 PR PBB SHADOW E&M-EST. PATIENT-LVL V: CPT | Mod: PBBFAC,,, | Performed by: INTERNAL MEDICINE

## 2024-11-12 PROCEDURE — 3079F DIAST BP 80-89 MM HG: CPT | Mod: CPTII,S$GLB,, | Performed by: INTERNAL MEDICINE

## 2024-11-12 PROCEDURE — 3044F HG A1C LEVEL LT 7.0%: CPT | Mod: CPTII,S$GLB,, | Performed by: INTERNAL MEDICINE

## 2024-11-12 PROCEDURE — 3077F SYST BP >= 140 MM HG: CPT | Mod: CPTII,S$GLB,, | Performed by: INTERNAL MEDICINE

## 2024-11-12 PROCEDURE — 99214 OFFICE O/P EST MOD 30 MIN: CPT | Mod: S$GLB,,, | Performed by: INTERNAL MEDICINE

## 2024-11-12 PROCEDURE — 3008F BODY MASS INDEX DOCD: CPT | Mod: CPTII,S$GLB,, | Performed by: INTERNAL MEDICINE

## 2024-11-12 PROCEDURE — 1111F DSCHRG MED/CURRENT MED MERGE: CPT | Mod: CPTII,S$GLB,, | Performed by: INTERNAL MEDICINE

## 2024-11-12 PROCEDURE — 1159F MED LIST DOCD IN RCRD: CPT | Mod: CPTII,S$GLB,, | Performed by: INTERNAL MEDICINE

## 2024-11-12 PROCEDURE — 3066F NEPHROPATHY DOC TX: CPT | Mod: CPTII,S$GLB,, | Performed by: INTERNAL MEDICINE

## 2024-11-12 NOTE — PROGRESS NOTES
Subjective:   11/12/2024     Patient ID:  Haroldo Dickinson is a 49 y.o. male who presents for evaulation of Hospital Follow Up      Patient has had several recent hospitalizations for non ST elevation MI, felt to be type 2, improved chest pain with removal of fluid on dialysis.  His blood pressure medicines are currently being adjusted by Nephrology.  Will continue to have them do so.  Medications reviewed with patient.    Hyperlipidemia is treated with high-dose statin therapy plus ezetimibe, most recent LDL cholesterol 47.      Previous cardiac catheterization showed severe distal disease, not felt to be a candidate for coronary interventions.    Recent PET stress test showed large fixed defects, no ischemia was present.    Echocardiography  ·  Left Ventricle: The left ventricle is normal in size. Increased ventricular mass. Normal wall thickness. There is eccentric hypertrophy. Regional wall motion abnormalities present. See diagram for wall motion findings. There is mildly reduced systolic function with a visually estimated ejection fraction of 40 - 45%. There is diastolic dysfunction.  ·  Right Ventricle: Normal right ventricular cavity size. Wall thickness is normal. Systolic function is normal.  ·  Left Atrium: Left atrium is severely dilated.  ·  Aortic Valve: There is moderate aortic valve sclerosis. There is annular calcification present. Cannot exclude bileaflet aortic valve. Mildly restricted motion.  ·  Mitral Valve: There is moderate posterior mitral annular calcification present. There is moderate to severe regurgitation.  ·  Tricuspid Valve: There is mild regurgitation.  ·  Pulmonary Artery: The estimated pulmonary artery systolic pressure is 61 mmHg.  ·  IVC/SVC: Normal venous pressure at 3 mmHg.  ·  Pericardium: There is a small posterior effusion.         Prior notes reviewed:  Hospital records reviewed, recently hospitalized with chest pain, underwent cardiac catheterization.  He was found have a  severely diseased distal LAD, but the proximal LAD had only mild disease.  The proximal circumflex also was free of disease, he would an occluded distal circumflex.  The right coronary artery is occluded, but appeared to be fairly small-vessel.  He is done well with alterations medical therapy with no exertional chest pains or tightness.      Blood pressure is well controlled.    Laboratory from May of 2023 showed an LDL cholesterol 74, should be less than 70 mg/dL, now on a atorvastatin 80 mg nightly, will add ezetimibe 10 mg daily.      Patient with chronic kidney failure, continues on dialysis.            Recent hospital note reviewed:     [facility] - Observation [address] [place] [address] Medicine  Discharge Summary        Patient Name: Haroldo Dickinson  MRN: [id number]  SADIE: [id number]  Patient Class: IP- Inpatient  Admission Date: 11/27/2023  Hospital Length of Stay: 1 days  Discharge Date and Time:  11/29/2023 6:34 PM  Attending Physician: Apple Arredondo MD   Discharging Provider: Mathieu Esteban PA-C  Primary Care Provider: [facility] Primary Doctor  [facility] Medicine Team: [facility] Mathieu Esteban PA-C  Primary Care Team: [facility]     HPI:   Patient is a 48-year-old male with past medical history significant for CAD with previous PCI x2, CVA with residual right-sided weakness, RCC status post nephrectomy, ESRD Monday Wednesday Friday, hypertensive kidney disease who is presenting with a 5 day of intermittent chest pain.  Patient describes the chest pain as waxing and waning with a burning quality.  He rates it as a 7/10 at its worst.  He states that it is worse at night and seems to happen more often at that time.  It is not associated with exertion or emotional stress.  The patient of note does not take any aspirin or clopidogrel as he states that a physician here told him not to despite having PCI.  Patient states that he last got dialysis on Friday and feels that he is slightly fluid overloaded.  He had an episode of  chest pain today that was associated with shortness of breath lasting 5 hours which prompted him to call an ambulance.  He denies any dizziness or diaphoresis associated with these episodes and states that after calling EMS, he felt better immediately.  In route to the emergency department, he received 1 dose of nitro with relief of his chest discomfort.  In the emergency room patient was hypertensive, non tachycardic on room air without discomfort.  Demonstrating no evidence of fluid overload on chest x-ray.  BNP was 179.  Troponins were slightly elevated but decreased compared to baseline at 0.096.  Potassium was markedly elevated at 5.7 with evidence of peaked T-waves on EKG and he received calcium, insulin/glucose and Lokelma.  He does not make any urine due to his bilateral nephrectomy.  He received 3 space of nitro and 325 of aspirin.             Procedure(s) (LRB):  Angiogram, Coronary, with Left Heart Cath (N/A)       Hospital Course:   Haroldo Dickinson is placed in  Observation for management of chest pain and subsequent NSTEMI. Chest pain resolving on admission. EKG and CXR reviewed. Hyperkalemic on admission, shifted, with repeat K within normal limits. Troponin trending. Continuing ACS for now. Cardiology following. Nephro follow for HD needs. Dialysis 11/28 during admission. Unable to schedule PET today, will proceed with C. C with known RCA  and distal LAD lesions, Interventional Cardiology recommending medical therapy. Heparin gtt continued to complex 48 hours of medical treatment. HD 11/29.      Patient will continue ASA, plavix, and statin at discharge. He will stop amlodipine and start nifedipine, he will increase hydralazine to 50mg q8h,and increase coreg to 50mg bid. Medications sent to bedside delivery. He will follow up with Internal Medicine and Cardiology. He will resume his scheduled HD appointments. Return precautions provided. Patient medically ready for discharge. Plan of care  discussed with patient, patient agreeable to plan, and all questions answered.        Goals of Care Treatment Preferences:  Code Status: Full Code        Consults:     Consults (From admission, onward)             Status Ordering Provider        Inpatient consult to Interventional Cardiology  Once       Provider:  (Not yet assigned)   Completed KEILA NORIEGA        Inpatient consult to Cardiology  Once       Provider:  (Not yet assigned)   Completed LG JOHNSON        Inpatient consult to Nephrology  Once       Provider:  (Not yet assigned)   Completed LG JOHNSON                No new Assessment & Plan notes have been filed under this hospital service since the last note was generated.  Service: Hospital Medicine       Final Active Diagnoses:    Diagnosis Date Noted POA  · PRINCIPAL PROBLEM:  NSTEMI (non-ST elevated myocardial infarction) [I21.4] 11/27/2023 Yes  · Essential hypertension [I10] 11/21/2019 Yes  · Hyperkalemia [E87.5] 11/30/2022 Yes  · Hypertensive emergency [I16.1] 11/27/2023 Yes  · Anemia in ESRD (end-stage renal disease) [N18.6, D63.1] 06/14/2022 Yes  · Chronic kidney disease-mineral and bone disorder [N18.9, E83.9, M89.9] 06/14/2022 Yes  · CAD (coronary artery disease), native coronary artery [I25.10] 11/21/2019 Yes  · End stage renal failure on dialysis [N18.6, Z99.2] 06/05/2014 Not Applicable  · Controlled type 2 diabetes mellitus with CKD [E11.22]   Yes     Problems Resolved During this Admission:        Discharged Condition: stable     Disposition: Home or Self Care     Follow Up:     Patient Instructions:         ACCEPT - Ambulatory referral/consult to [facility]   Standing Status: Future  Referral Priority: Routine Referral Type: Consultation   Referral Reason: Specialty Services Required   Requested Specialty: Cardiology   Number of Visits Requested: 1        Ambulatory referral/consult to Internal Medicine   Standing Status: Future  Referral Priority:  Urgent Referral Type: Consultation   Referral Reason: Specialty Services Required   Requested Specialty: Internal Medicine   Number of Visits Requested: 1        Ambulatory referral/consult to Cardiology   Standing Status: Future  Referral Priority: Urgent Referral Type: Consultation   Referral Reason: Specialty Services Required   Requested Specialty: Cardiology   Number of Visits Requested: 1        Diet renal       Notify your health care provider if you experience any of the following:  severe uncontrolled pain       Notify your health care provider if you experience any of the following:  difficulty breathing or increased cough       Notify your health care provider if you experience any of the following:  increased confusion or weakness       Notify your health care provider if you experience any of the following:  persistent dizziness, light-headedness, or visual disturbances       Activity as tolerated        Significant Diagnostic Studies: N/A       Pending Diagnostic Studies:          None             Medications:  Reconciled Home Medications:        Medication List          START taking these medications        atorvastatin 80 MG tablet  Commonly known as: LIPITOR  Take 1 tablet (80 mg total) by mouth once daily.  Start taking on: November 30, 2023     clopidogreL 75 mg tablet  Commonly known as: PLAVIX  Take 1 tablet (75 mg total) by mouth once daily.  Start taking on: November 30, 2023     NIFEdipine 60 MG (OSM) 24 hr tablet  Commonly known as: PROCARDIA-XL  Take 1 tablet (60 mg total) by mouth once daily.  Start taking on: November 30, 2023                  CHANGE how you take these medications        carvediloL 25 MG tablet  Commonly known as: COREG  Take 2 tablets (50 mg total) by mouth 2 (two) times daily with meals.  What changed: how much to take     hydrALAZINE 50 MG tablet  Commonly known as: APRESOLINE  Take 1 tablet (50 mg total) by mouth every 8 (eight) hours. for SBP > 140mm HG  What  changed:   · medication strength  · how much to take                  CONTINUE taking these medications        aspirin 81 MG EC tablet  Commonly known as: ECOTRIN  Take 1 tablet (81 mg total) by mouth once daily.     ciclopirox 8 % Soln  Commonly known as: PENLAC  Apply topically nightly.     cinacalcet 90 MG Tab  Commonly known as: SENSIPAR  Take 1 tablet by mouth once daily.     polyethylene glycol 17 gram/dose powder  Commonly known as: GLYCOLAX  Use cap to measure 17 g, then mix in liquid and drink by mouth once daily.     sevelamer carbonate 800 mg Tab  Commonly known as: RENVELA  Take 800 mg by mouth 3 (three) times daily with meals.                  STOP taking these medications        amLODIPine 10 MG tablet  Commonly known as: NORVASC                   Indwelling Lines/Drains at time of discharge:     Lines/Drains/Airways            Drain  Duration                     Hemodialysis AV Fistula Right forearm -- days                          Time spent on the discharge of patient: 33 minutes          Past Medical History:   Diagnosis Date    CAD (coronary artery disease), native coronary artery 11/21/2019    Cataract     Diabetes mellitus     Diabetic retinopathy     Dialysis patient     DM type 2 causing renal disease, not at goal     ESRD (end stage renal disease) started dialysis 01/2014 06/05/2014    History of colon polyps 02/10/2021    History of COVID-19 12/29/2022    Hyperparathyroidism, secondary renal 06/05/2014    Hypertension     NSTEMI (non-ST elevated myocardial infarction) 12/21/2013    Organ transplant candidate 06/05/2014    Palliative care encounter 10/29/2024    Pleural effusion 06/07/2024    Pneumonia     Post PTCA 08/26/2020    RCA [facility] 7-25-20    Renal cell carcinoma of right kidney     Renal hypertension     Stroke        Review of patient's allergies indicates:   Allergen Reactions    No known allergies          Current Outpatient Medications:     aspirin (ECOTRIN) 81 MG EC tablet, Take 1  tablet (81 mg total) by mouth once daily., Disp: 30 tablet, Rfl: 11    atorvastatin (LIPITOR) 80 MG tablet, Take 1 tablet (80 mg total) by mouth once daily., Disp: 90 tablet, Rfl: 1    carvediloL (COREG) 25 MG tablet, Take 2 tablets (50 mg total) by mouth 2 (two) times daily with meals., Disp: 360 tablet, Rfl: 3    clopidogreL (PLAVIX) 75 mg tablet, Take 1 tablet (75 mg total) by mouth once daily., Disp: 90 tablet, Rfl: 3    epoetin philip (PROCRIT) 4,000 unit/mL injection, Inject 1.93 mLs (7,720 Units total) into the skin every Mon, Wed, Fri., Disp: , Rfl:     ezetimibe (ZETIA) 10 mg tablet, Take 1 tablet (10 mg total) by mouth once daily., Disp: 90 tablet, Rfl: 3    hydrALAZINE (APRESOLINE) 100 MG tablet, Take 1 tablet (100 mg total) by mouth every 8 (eight) hours., Disp: 90 tablet, Rfl: 11    isosorbide mononitrate (IMDUR) 120 MG 24 hr tablet, Take 1 tablet (120 mg total) by mouth once daily., Disp: 90 tablet, Rfl: 1    methoxy peg-epoetin beta (MIRCERA INJ), 150 mcg., Disp: , Rfl:     minoxidiL (LONITEN) 10 MG Tab, Take 1 tablet (10 mg total) by mouth 2 (two) times daily., Disp: 60 tablet, Rfl: 1    NIFEdipine (PROCARDIA-XL) 90 MG (OSM) 24 hr tablet, Take 1 tablet (90 mg total) by mouth once daily., Disp: 90 tablet, Rfl: 3    nitroGLYCERIN (NITROSTAT) 0.4 MG SL tablet, Place 1 tablet (0.4 mg total) under the tongue every 5 (five) minutes as needed for Chest pain., Disp: 25 tablet, Rfl: 1    polyethylene glycol (GLYCOLAX) 17 gram/dose powder, Take 17 g by mouth daily as needed for Constipation., Disp: 238 g, Rfl: 0    ranolazine (RANEXA) 500 MG Tb12, Take 1 tablet (500 mg total) by mouth Daily., Disp: 30 tablet, Rfl: 1    SENSIPAR 60 mg Tab, Take 60 mg by mouth daily with breakfast., Disp: , Rfl:     sevelamer carbonate (RENVELA) 800 mg Tab, Take 2 tablets (1,600 mg total) by mouth 3 (three) times daily with meals., Disp: 180 tablet, Rfl: 11    vit B complex-C-folic ac-zinc (RENAPLEX) 800 mcg- 12.5 mg Tab, Take 1  tablet by mouth., Disp: , Rfl:     XPHOZAH 20 mg Tab, Take 20 mg by mouth 2 (two) times a day., Disp: , Rfl:   No current facility-administered medications for this visit.    Facility-Administered Medications Ordered in Other Visits:     lidocaine (PF) 10 mg/ml (1%) injection 10 mg, 1 mL, Intradermal, Once, Michelle Spence MD     Objective:   Review of Systems   Cardiovascular:  Positive for chest pain and dyspnea on exertion. Negative for claudication, cyanosis, irregular heartbeat, leg swelling, near-syncope, orthopnea, palpitations, paroxysmal nocturnal dyspnea and syncope.         Vitals:    11/12/24 1318   BP: (!) 142/85   Pulse: 63       Wt Readings from Last 3 Encounters:   11/12/24 70.4 kg (155 lb 3.3 oz)   10/31/24 72.1 kg (159 lb)   10/28/24 68.5 kg (151 lb)     Temp Readings from Last 3 Encounters:   11/02/24 98.1 °F (36.7 °C) (Oral)   10/29/24 98 °F (36.7 °C)   09/04/24 98.6 °F (37 °C) (Oral)     BP Readings from Last 3 Encounters:   11/12/24 (!) 142/85   11/02/24 (!) 126/59   10/29/24 138/69     Pulse Readings from Last 3 Encounters:   11/12/24 63   11/02/24 75   10/31/24 80             Physical Exam  Vitals reviewed.   Constitutional:       General: He is not in acute distress.     Appearance: He is well-developed.   HENT:      Head: Normocephalic and atraumatic.      Nose: Nose normal.   Eyes:      Conjunctiva/sclera: Conjunctivae normal.      Pupils: Pupils are equal, round, and reactive to light.   Neck:      Vascular: No carotid bruit or JVD.   Cardiovascular:      Rate and Rhythm: Normal rate and regular rhythm.      Pulses: Intact distal pulses.      Heart sounds: Normal heart sounds. No murmur heard.     No friction rub. No gallop.   Pulmonary:      Effort: Pulmonary effort is normal. No respiratory distress.      Breath sounds: Normal breath sounds. No wheezing or rales.   Chest:      Chest wall: No tenderness.   Abdominal:      General: Bowel sounds are normal. There is no distension.       Palpations: Abdomen is soft.      Tenderness: There is no abdominal tenderness.   Musculoskeletal:         General: No tenderness or deformity. Normal range of motion.      Cervical back: Normal range of motion and neck supple.      Right lower leg: No edema.      Left lower leg: No edema.   Skin:     General: Skin is warm and dry.      Findings: No erythema or rash.   Neurological:      Mental Status: He is alert and oriented to person, place, and time.      Cranial Nerves: No cranial nerve deficit.      Motor: No abnormal muscle tone.      Coordination: Coordination normal.   Psychiatric:         Behavior: Behavior normal.         Thought Content: Thought content normal.         Judgment: Judgment normal.           Lab Results   Component Value Date    CHOL 131 09/02/2024    CHOL 96 (L) 03/11/2024    CHOL 144 05/02/2023     Lab Results   Component Value Date    HDL 67 09/02/2024    HDL 51 03/11/2024    HDL 51 05/02/2023     Lab Results   Component Value Date    LDLCALC 47.4 (L) 09/02/2024    LDLCALC 33.6 (L) 03/11/2024    LDLCALC 74.2 05/02/2023     Lab Results   Component Value Date    ALT 11 11/02/2024    AST 20 11/02/2024    AST 21 11/01/2024    AST 17 10/31/2024     Lab Results   Component Value Date    CREATININE 9.2 (H) 11/02/2024    BUN 43 (H) 11/02/2024     (L) 11/02/2024    K 4.5 11/02/2024    CO2 24 11/02/2024    CO2 19 (L) 11/01/2024    CO2 20 (L) 10/31/2024     Lab Results   Component Value Date    HGB 9.4 (L) 11/06/2024    HCT 29.2 (L) 11/02/2024    HCT 29.0 (L) 11/02/2024    HCT 30.8 (L) 11/01/2024                         Assessment and Plan:     Triple vessel coronary artery disease  Comments:  Only a candidate for continued medical therapy  Orders:  -     Ambulatory referral/consult to Cardiology    Essential hypertension  Comments:  Increase minoxidil to twice a day, only taking it once a day, after visit summary notes twice a day  Orders:  -     Ambulatory referral/consult to  Cardiology    NSTEMI (non-ST elevated myocardial infarction)  Comments:  Hospital records reviewed again  Orders:  -     Ambulatory referral/consult to Cardiology    Chronic diastolic heart failure  Comments:  Keep his dry as possible on dialysis           No follow-ups on file.          Future Appointments   Date Time Provider Department Center   11/12/2024  1:40 PM Luke Gonzalez Jr., MD [facility] [place]   11/19/2024  9:40 AM [facility] [place]   11/19/2024  2:30 PM Felipa Ramirez, DPM [facility] [place]

## 2024-11-18 ENCOUNTER — TELEPHONE (OUTPATIENT)
Dept: FAMILY MEDICINE | Facility: CLINIC | Age: 49
End: 2024-11-18
Payer: MEDICARE

## 2024-11-18 NOTE — TELEPHONE ENCOUNTER
Calling to confirm appointment on 11/19, patient answered but didn't say anything so couldn't get a verbal confirmation

## 2024-11-21 ENCOUNTER — OFFICE VISIT (OUTPATIENT)
Dept: PODIATRY | Facility: CLINIC | Age: 49
End: 2024-11-21
Payer: MEDICARE

## 2024-11-21 VITALS — BODY MASS INDEX: 24.36 KG/M2 | WEIGHT: 155.19 LBS | HEIGHT: 67 IN

## 2024-11-21 DIAGNOSIS — N18.6 CONTROLLED TYPE 2 DIABETES MELLITUS WITH CHRONIC KIDNEY DISEASE ON CHRONIC DIALYSIS, WITHOUT LONG-TERM CURRENT USE OF INSULIN: Primary | ICD-10-CM

## 2024-11-21 DIAGNOSIS — E11.22 CONTROLLED TYPE 2 DIABETES MELLITUS WITH CHRONIC KIDNEY DISEASE ON CHRONIC DIALYSIS, WITHOUT LONG-TERM CURRENT USE OF INSULIN: Primary | ICD-10-CM

## 2024-11-21 DIAGNOSIS — Z99.2 CONTROLLED TYPE 2 DIABETES MELLITUS WITH CHRONIC KIDNEY DISEASE ON CHRONIC DIALYSIS, WITHOUT LONG-TERM CURRENT USE OF INSULIN: Primary | ICD-10-CM

## 2024-11-21 DIAGNOSIS — B35.1 ONYCHOMYCOSIS DUE TO DERMATOPHYTE: ICD-10-CM

## 2024-11-21 PROCEDURE — 99999 PR PBB SHADOW E&M-EST. PATIENT-LVL IV: CPT | Mod: PBBFAC,,, | Performed by: PODIATRIST

## 2024-11-21 RX ORDER — CICLOPIROX 80 MG/ML
SOLUTION TOPICAL NIGHTLY
Qty: 6.6 ML | Refills: 3 | Status: SHIPPED | OUTPATIENT
Start: 2024-11-21

## 2024-11-21 NOTE — PROGRESS NOTES
Subjective:     Patient ID: Haroldo Dickinson is a 49 y.o. male.    Chief Complaint: Diabetes Mellitus (7/23/24 SAFIA Mota) and Nail Care    Haroldo is a 49 y.o. male who presents to the clinic upon referral from Dr. Elizabeth joy. provider found  for evaluation and treatment of diabetic feet. Haroldo has a past medical history of CAD (coronary artery disease), native coronary artery (11/21/2019), Cataract, Diabetes mellitus, Diabetic retinopathy, Dialysis patient, DM type 2 causing renal disease, not at goal, ESRD (end stage renal disease) started dialysis 01/2014 (06/05/2014), History of colon polyps (02/10/2021), History of COVID-19 (12/29/2022), Hyperparathyroidism, secondary renal (06/05/2014), Hypertension, NSTEMI (non-ST elevated myocardial infarction) (12/21/2013), Organ transplant candidate (06/05/2014), Palliative care encounter (10/29/2024), Pleural effusion (06/07/2024), Pneumonia, Post PTCA (08/26/2020), Renal cell carcinoma of right kidney, Renal hypertension, and Stroke. Presents for diabetic foot risk assessment. Requesting new rx for penlac    PCP: Mireya Larose MD    Date Last Seen by PCP: per above    Current shoe gear: Tennis shoes    Hemoglobin A1C   Date Value Ref Range Status   10/11/2024 4.3 (L) 4.8 - 5.9 % Final   07/10/2024 4.3 (L) 4.8 - 5.9 % Final   04/11/2024 4.5 (L) 4.8 - 5.9 % Final   03/11/2024 4.7 4.0 - 5.6 % Final     Comment:     ADA Screening Guidelines:  5.7-6.4%  Consistent with prediabetes  >or=6.5%  Consistent with diabetes    High levels of fetal hemoglobin interfere with the HbA1C  assay. Heterozygous hemoglobin variants (HbS, HgC, etc)do  not significantly interfere with this assay.   However, presence of multiple variants may affect accuracy.     11/27/2023 5.1 4.0 - 5.6 % Final     Comment:     ADA Screening Guidelines:  5.7-6.4%  Consistent with prediabetes  >or=6.5%  Consistent with diabetes    High levels of fetal hemoglobin interfere with the HbA1C  assay. Heterozygous hemoglobin  variants (HbS, HgC, etc)do  not significantly interfere with this assay.   However, presence of multiple variants may affect accuracy.     05/02/2023 4.6 4.0 - 5.6 % Final     Comment:     ADA Screening Guidelines:  5.7-6.4%  Consistent with prediabetes  >or=6.5%  Consistent with diabetes    High levels of fetal hemoglobin interfere with the HbA1C  assay. Heterozygous hemoglobin variants (HbS, HgC, etc)do  not significantly interfere with this assay.   However, presence of multiple variants may affect accuracy.           Review of Systems   Constitutional: Negative for chills.   Cardiovascular:  Negative for chest pain and claudication.   Respiratory:  Negative for cough.    Skin:  Positive for color change, dry skin and nail changes.   Musculoskeletal:  Positive for joint pain.   Gastrointestinal:  Negative for nausea.   Neurological:  Positive for paresthesias. Negative for numbness.   Psychiatric/Behavioral:  The patient is not nervous/anxious.         Objective:     Physical Exam  Constitutional:       Appearance: He is well-developed.      Comments: Oriented to time, place, and person.   Cardiovascular:      Comments: DP and PT pulses are palpable bilaterally. 3 sec capillary refill time and toes and feet are warm to touch proximally .  There is  hair growth on the feet and toes b/l. There is no edema b/l. No spider veins or varicosities present b/l.     Musculoskeletal:      Comments: Equinus noted b/l ankles with < 10 deg DF noted. MMT 5/5 in DF/PF/Inv/Ev resistance with no reproduction of pain in any direction. Passive range of motion of ankle and pedal joints is painless b/l.     Feet:      Right foot:      Skin integrity: No callus or dry skin.      Left foot:      Skin integrity: No callus or dry skin.   Lymphadenopathy:      Comments: Negative lymphadenopathy bilateral popliteal fossa and tarsal tunnel.   Skin:     Comments: No open lesions, lacerations or wounds noted.Interdigital spaces clean, dry and  intact b/l. No erythema noted to b/l foot.    Toenails 1-5 bilaterally are elongated by 2-3 mm, thickened by 2-3 mm, discolored/yellowed, dystrophic, brittle with subungual debris.     Neurological:      Mental Status: He is alert.      Comments: Light touch, proprioception, and sharp/dull sensation are all intact bilaterally. Protective threshold with the Bethesda-Wienstein monofilament is intact bilaterally.    Psychiatric:         Behavior: Behavior is cooperative.           Assessment:      Encounter Diagnoses   Name Primary?    Controlled type 2 diabetes mellitus with chronic kidney disease on chronic dialysis, without long-term current use of insulin Yes    Onychomycosis due to dermatophyte          Plan:     Haroldo was seen today for diabetes mellitus and nail care.    Diagnoses and all orders for this visit:    Controlled type 2 diabetes mellitus with chronic kidney disease on chronic dialysis, without long-term current use of insulin    Onychomycosis due to dermatophyte    Other orders  -     ciclopirox (PENLAC) 8 % Soln; Apply topically nightly.          I counseled the patient on his conditions, their implications and medical management.      - Shoe inspection. Diabetic Foot Education. Patient reminded of the importance of good nutrition and blood sugar control to help prevent podiatric complications of diabetes. Patient instructed on proper foot hygeine. We discussed wearing proper shoe gear, daily foot inspections, never walking without protective shoe gear, never putting sharp instruments to feet, routine podiatric nail visits every 4-6 months.   - With patient's permission, nails were aggressively reduced and debrided x 10 to their soft tissue attachment mechanically and with electric , removing all offending nail and debris. Patient relates relief following the procedure. He will continue to monitor the areas daily, inspect his feet, wear protective shoe gear when ambulatory, moisturizer to  maintain skin integrity and follow in this office in approximately 4-6 months, sooner p.r.n.     At patient's request, I discussed different treatments for toenail fungus. We discussed oral antifungals but I did not recommend them as a first line treatment since the medication is taken internally and can have side effects such as rash, taste disturbances, and liver enzyme elevation. We discussed topical Penlac to be applied daily and removed weekly. Pt. Expresses understanding and would like to try the Penlac. Rx sent to the pharmacy.

## 2024-12-27 ENCOUNTER — HOSPITAL ENCOUNTER (INPATIENT)
Facility: HOSPITAL | Age: 49
LOS: 2 days | Discharge: HOME | DRG: 280 | End: 2024-12-30
Attending: EMERGENCY MEDICINE | Admitting: STUDENT IN AN ORGANIZED HEALTH CARE EDUCATION/TRAINING PROGRAM
Payer: MEDICARE

## 2024-12-27 DIAGNOSIS — R07.9 CHEST PAIN: ICD-10-CM

## 2024-12-27 DIAGNOSIS — I21.4 NSTEMI (NON-ST ELEVATED MYOCARDIAL INFARCTION): ICD-10-CM

## 2024-12-27 DIAGNOSIS — N18.6 ESRD (END STAGE RENAL DISEASE): ICD-10-CM

## 2024-12-27 DIAGNOSIS — E87.70 HYPERVOLEMIA, UNSPECIFIED HYPERVOLEMIA TYPE: Primary | ICD-10-CM

## 2024-12-27 PROBLEM — I24.89 DEMAND ISCHEMIA: Status: ACTIVE | Noted: 2024-12-27

## 2024-12-27 LAB
ALBUMIN SERPL BCP-MCNC: 3.4 G/DL (ref 3.5–5.2)
ALP SERPL-CCNC: 69 U/L (ref 40–150)
ALT SERPL W/O P-5'-P-CCNC: 8 U/L (ref 10–44)
ANION GAP SERPL CALC-SCNC: 16 MMOL/L (ref 8–16)
APTT PPP: 29.1 SEC (ref 21–32)
AST SERPL-CCNC: 11 U/L (ref 10–40)
BASOPHILS # BLD AUTO: 0.01 K/UL (ref 0–0.2)
BASOPHILS # BLD AUTO: 0.07 K/UL (ref 0–0.2)
BASOPHILS NFR BLD: 0.1 % (ref 0–1.9)
BASOPHILS NFR BLD: 0.7 % (ref 0–1.9)
BILIRUB SERPL-MCNC: 0.7 MG/DL (ref 0.1–1)
BNP SERPL-MCNC: 1322 PG/ML (ref 0–99)
BUN SERPL-MCNC: 68 MG/DL (ref 6–20)
CALCIUM SERPL-MCNC: 9.3 MG/DL (ref 8.7–10.5)
CHLORIDE SERPL-SCNC: 106 MMOL/L (ref 95–110)
CO2 SERPL-SCNC: 20 MMOL/L (ref 23–29)
CREAT SERPL-MCNC: 11.9 MG/DL (ref 0.5–1.4)
DIFFERENTIAL METHOD BLD: ABNORMAL
DIFFERENTIAL METHOD BLD: ABNORMAL
EOSINOPHIL # BLD AUTO: 0.5 K/UL (ref 0–0.5)
EOSINOPHIL # BLD AUTO: 0.5 K/UL (ref 0–0.5)
EOSINOPHIL NFR BLD: 4.6 % (ref 0–8)
EOSINOPHIL NFR BLD: 5.1 % (ref 0–8)
ERYTHROCYTE [DISTWIDTH] IN BLOOD BY AUTOMATED COUNT: 15 % (ref 11.5–14.5)
ERYTHROCYTE [DISTWIDTH] IN BLOOD BY AUTOMATED COUNT: 15.3 % (ref 11.5–14.5)
EST. GFR  (NO RACE VARIABLE): 4.8 ML/MIN/1.73 M^2
GLUCOSE SERPL-MCNC: 168 MG/DL (ref 70–110)
HBV SURFACE AG SERPL QL IA: NORMAL
HCT VFR BLD AUTO: 32.9 % (ref 40–54)
HCT VFR BLD AUTO: 34.4 % (ref 40–54)
HGB BLD-MCNC: 10.5 G/DL (ref 14–18)
HGB BLD-MCNC: 11 G/DL (ref 14–18)
IMM GRANULOCYTES # BLD AUTO: 0.04 K/UL (ref 0–0.04)
IMM GRANULOCYTES # BLD AUTO: 0.05 K/UL (ref 0–0.04)
IMM GRANULOCYTES NFR BLD AUTO: 0.4 % (ref 0–0.5)
IMM GRANULOCYTES NFR BLD AUTO: 0.5 % (ref 0–0.5)
INR PPP: 1 (ref 0.8–1.2)
LYMPHOCYTES # BLD AUTO: 1.2 K/UL (ref 1–4.8)
LYMPHOCYTES # BLD AUTO: 1.3 K/UL (ref 1–4.8)
LYMPHOCYTES NFR BLD: 11.5 % (ref 18–48)
LYMPHOCYTES NFR BLD: 12.2 % (ref 18–48)
MCH RBC QN AUTO: 30 PG (ref 27–31)
MCH RBC QN AUTO: 30.6 PG (ref 27–31)
MCHC RBC AUTO-ENTMCNC: 31.9 G/DL (ref 32–36)
MCHC RBC AUTO-ENTMCNC: 32 G/DL (ref 32–36)
MCV RBC AUTO: 94 FL (ref 82–98)
MCV RBC AUTO: 96 FL (ref 82–98)
MONOCYTES # BLD AUTO: 1.2 K/UL (ref 0.3–1)
MONOCYTES # BLD AUTO: 1.3 K/UL (ref 0.3–1)
MONOCYTES NFR BLD: 11.7 % (ref 4–15)
MONOCYTES NFR BLD: 13 % (ref 4–15)
NEUTROPHILS # BLD AUTO: 7 K/UL (ref 1.8–7.7)
NEUTROPHILS # BLD AUTO: 7.2 K/UL (ref 1.8–7.7)
NEUTROPHILS NFR BLD: 69.8 % (ref 38–73)
NEUTROPHILS NFR BLD: 70.4 % (ref 38–73)
NRBC BLD-RTO: 0 /100 WBC
NRBC BLD-RTO: 0 /100 WBC
OHS QRS DURATION: 112 MS
OHS QRS DURATION: 114 MS
OHS QTC CALCULATION: 448 MS
OHS QTC CALCULATION: 466 MS
PHOSPHATE SERPL-MCNC: 7 MG/DL (ref 2.7–4.5)
PLATELET # BLD AUTO: 261 K/UL (ref 150–450)
PLATELET # BLD AUTO: 283 K/UL (ref 150–450)
PMV BLD AUTO: 10.3 FL (ref 9.2–12.9)
PMV BLD AUTO: 10.3 FL (ref 9.2–12.9)
POCT GLUCOSE: 149 MG/DL (ref 70–110)
POCT GLUCOSE: 201 MG/DL (ref 70–110)
POCT GLUCOSE: 219 MG/DL (ref 70–110)
POTASSIUM SERPL-SCNC: 4.1 MMOL/L (ref 3.5–5.1)
PROT SERPL-MCNC: 7.1 G/DL (ref 6–8.4)
PROTHROMBIN TIME: 11.4 SEC (ref 9–12.5)
RBC # BLD AUTO: 3.5 M/UL (ref 4.6–6.2)
RBC # BLD AUTO: 3.6 M/UL (ref 4.6–6.2)
SODIUM SERPL-SCNC: 142 MMOL/L (ref 136–145)
TROPONIN I SERPL DL<=0.01 NG/ML-MCNC: 1777 NG/L (ref 0–35)
TROPONIN I SERPL DL<=0.01 NG/ML-MCNC: 240 NG/L (ref 0–35)
TROPONIN I SERPL DL<=0.01 NG/ML-MCNC: 364 NG/L (ref 0–35)
TROPONIN I SERPL DL<=0.01 NG/ML-MCNC: 5586 NG/L (ref 0–35)
TROPONIN I SERPL DL<=0.01 NG/ML-MCNC: 561 NG/L (ref 0–35)
WBC # BLD AUTO: 10.03 K/UL (ref 3.9–12.7)
WBC # BLD AUTO: 10.22 K/UL (ref 3.9–12.7)

## 2024-12-27 PROCEDURE — 99285 EMERGENCY DEPT VISIT HI MDM: CPT | Mod: 25

## 2024-12-27 PROCEDURE — 87340 HEPATITIS B SURFACE AG IA: CPT | Performed by: STUDENT IN AN ORGANIZED HEALTH CARE EDUCATION/TRAINING PROGRAM

## 2024-12-27 PROCEDURE — 25000242 PHARM REV CODE 250 ALT 637 W/ HCPCS: Performed by: EMERGENCY MEDICINE

## 2024-12-27 PROCEDURE — 93010 ELECTROCARDIOGRAM REPORT: CPT | Mod: ,,, | Performed by: INTERNAL MEDICINE

## 2024-12-27 PROCEDURE — 93005 ELECTROCARDIOGRAM TRACING: CPT

## 2024-12-27 PROCEDURE — 63600175 PHARM REV CODE 636 W HCPCS: Performed by: PHYSICIAN ASSISTANT

## 2024-12-27 PROCEDURE — 99214 OFFICE O/P EST MOD 30 MIN: CPT | Mod: ,,, | Performed by: NURSE PRACTITIONER

## 2024-12-27 PROCEDURE — 84100 ASSAY OF PHOSPHORUS: CPT

## 2024-12-27 PROCEDURE — 25000003 PHARM REV CODE 250: Performed by: NURSE PRACTITIONER

## 2024-12-27 PROCEDURE — 96365 THER/PROPH/DIAG IV INF INIT: CPT

## 2024-12-27 PROCEDURE — G0378 HOSPITAL OBSERVATION PER HR: HCPCS

## 2024-12-27 PROCEDURE — G0257 UNSCHED DIALYSIS ESRD PT HOS: HCPCS

## 2024-12-27 PROCEDURE — 96366 THER/PROPH/DIAG IV INF ADDON: CPT

## 2024-12-27 PROCEDURE — 85610 PROTHROMBIN TIME: CPT | Performed by: PHYSICIAN ASSISTANT

## 2024-12-27 PROCEDURE — 93010 ELECTROCARDIOGRAM REPORT: CPT | Mod: 76,,, | Performed by: INTERNAL MEDICINE

## 2024-12-27 PROCEDURE — 85025 COMPLETE CBC W/AUTO DIFF WBC: CPT | Mod: 91 | Performed by: PHYSICIAN ASSISTANT

## 2024-12-27 PROCEDURE — 85025 COMPLETE CBC W/AUTO DIFF WBC: CPT | Performed by: EMERGENCY MEDICINE

## 2024-12-27 PROCEDURE — 25000003 PHARM REV CODE 250: Performed by: EMERGENCY MEDICINE

## 2024-12-27 PROCEDURE — 96375 TX/PRO/DX INJ NEW DRUG ADDON: CPT

## 2024-12-27 PROCEDURE — 80053 COMPREHEN METABOLIC PANEL: CPT | Performed by: EMERGENCY MEDICINE

## 2024-12-27 PROCEDURE — 5A1D70Z PERFORMANCE OF URINARY FILTRATION, INTERMITTENT, LESS THAN 6 HOURS PER DAY: ICD-10-PCS | Performed by: NURSE PRACTITIONER

## 2024-12-27 PROCEDURE — 84484 ASSAY OF TROPONIN QUANT: CPT | Mod: 91

## 2024-12-27 PROCEDURE — 25000003 PHARM REV CODE 250: Performed by: PHYSICIAN ASSISTANT

## 2024-12-27 PROCEDURE — 25000003 PHARM REV CODE 250

## 2024-12-27 PROCEDURE — 84484 ASSAY OF TROPONIN QUANT: CPT | Performed by: EMERGENCY MEDICINE

## 2024-12-27 PROCEDURE — 85730 THROMBOPLASTIN TIME PARTIAL: CPT | Performed by: PHYSICIAN ASSISTANT

## 2024-12-27 PROCEDURE — 99223 1ST HOSP IP/OBS HIGH 75: CPT | Mod: GC,,, | Performed by: STUDENT IN AN ORGANIZED HEALTH CARE EDUCATION/TRAINING PROGRAM

## 2024-12-27 PROCEDURE — 25000242 PHARM REV CODE 250 ALT 637 W/ HCPCS: Performed by: PHYSICIAN ASSISTANT

## 2024-12-27 PROCEDURE — 36415 COLL VENOUS BLD VENIPUNCTURE: CPT | Performed by: PHYSICIAN ASSISTANT

## 2024-12-27 PROCEDURE — 83880 ASSAY OF NATRIURETIC PEPTIDE: CPT | Performed by: EMERGENCY MEDICINE

## 2024-12-27 PROCEDURE — 63600175 PHARM REV CODE 636 W HCPCS: Performed by: EMERGENCY MEDICINE

## 2024-12-27 PROCEDURE — 36415 COLL VENOUS BLD VENIPUNCTURE: CPT

## 2024-12-27 PROCEDURE — 25000003 PHARM REV CODE 250: Performed by: STUDENT IN AN ORGANIZED HEALTH CARE EDUCATION/TRAINING PROGRAM

## 2024-12-27 RX ORDER — GLUCAGON 1 MG
1 KIT INJECTION
Status: DISCONTINUED | OUTPATIENT
Start: 2024-12-27 | End: 2024-12-30 | Stop reason: HOSPADM

## 2024-12-27 RX ORDER — SEVELAMER CARBONATE 800 MG/1
1600 TABLET, FILM COATED ORAL
Status: DISCONTINUED | OUTPATIENT
Start: 2024-12-27 | End: 2024-12-30 | Stop reason: HOSPADM

## 2024-12-27 RX ORDER — ISOSORBIDE MONONITRATE 60 MG/1
120 TABLET, EXTENDED RELEASE ORAL
Status: COMPLETED | OUTPATIENT
Start: 2024-12-27 | End: 2024-12-27

## 2024-12-27 RX ORDER — SODIUM CHLORIDE 0.9 % (FLUSH) 0.9 %
10 SYRINGE (ML) INJECTION EVERY 8 HOURS PRN
Status: DISCONTINUED | OUTPATIENT
Start: 2024-12-27 | End: 2024-12-30 | Stop reason: HOSPADM

## 2024-12-27 RX ORDER — IBUPROFEN 200 MG
24 TABLET ORAL
Status: DISCONTINUED | OUTPATIENT
Start: 2024-12-27 | End: 2024-12-30 | Stop reason: HOSPADM

## 2024-12-27 RX ORDER — POLYETHYLENE GLYCOL 3350 17 G/17G
17 POWDER, FOR SOLUTION ORAL DAILY PRN
Status: DISCONTINUED | OUTPATIENT
Start: 2024-12-27 | End: 2024-12-30 | Stop reason: HOSPADM

## 2024-12-27 RX ORDER — CARVEDILOL 25 MG/1
50 TABLET ORAL 2 TIMES DAILY WITH MEALS
Status: DISCONTINUED | OUTPATIENT
Start: 2024-12-27 | End: 2024-12-29

## 2024-12-27 RX ORDER — ATORVASTATIN CALCIUM 40 MG/1
80 TABLET, FILM COATED ORAL DAILY
Status: DISCONTINUED | OUTPATIENT
Start: 2024-12-27 | End: 2024-12-30 | Stop reason: HOSPADM

## 2024-12-27 RX ORDER — CLONIDINE HYDROCHLORIDE 0.1 MG/1
0.1 TABLET ORAL 2 TIMES DAILY
Status: DISCONTINUED | OUTPATIENT
Start: 2024-12-27 | End: 2024-12-28

## 2024-12-27 RX ORDER — MORPHINE SULFATE 4 MG/ML
4 INJECTION, SOLUTION INTRAMUSCULAR; INTRAVENOUS
Status: COMPLETED | OUTPATIENT
Start: 2024-12-27 | End: 2024-12-27

## 2024-12-27 RX ORDER — RANOLAZINE 500 MG/1
1000 TABLET, EXTENDED RELEASE ORAL DAILY
Status: DISCONTINUED | OUTPATIENT
Start: 2024-12-27 | End: 2024-12-30 | Stop reason: HOSPADM

## 2024-12-27 RX ORDER — TALC
6 POWDER (GRAM) TOPICAL NIGHTLY PRN
Status: DISCONTINUED | OUTPATIENT
Start: 2024-12-27 | End: 2024-12-30 | Stop reason: HOSPADM

## 2024-12-27 RX ORDER — OXYCODONE HYDROCHLORIDE 5 MG/1
5 TABLET ORAL EVERY 6 HOURS PRN
Status: DISCONTINUED | OUTPATIENT
Start: 2024-12-27 | End: 2024-12-30 | Stop reason: HOSPADM

## 2024-12-27 RX ORDER — ONDANSETRON HYDROCHLORIDE 2 MG/ML
4 INJECTION, SOLUTION INTRAVENOUS EVERY 8 HOURS PRN
Status: DISCONTINUED | OUTPATIENT
Start: 2024-12-27 | End: 2024-12-30 | Stop reason: HOSPADM

## 2024-12-27 RX ORDER — HEPARIN SODIUM 5000 [USP'U]/ML
5000 INJECTION, SOLUTION INTRAVENOUS; SUBCUTANEOUS EVERY 8 HOURS
Status: DISCONTINUED | OUTPATIENT
Start: 2024-12-27 | End: 2024-12-27

## 2024-12-27 RX ORDER — CARVEDILOL 12.5 MG/1
50 TABLET ORAL
Status: COMPLETED | OUTPATIENT
Start: 2024-12-27 | End: 2024-12-27

## 2024-12-27 RX ORDER — ACETAMINOPHEN 325 MG/1
650 TABLET ORAL EVERY 4 HOURS PRN
Status: DISCONTINUED | OUTPATIENT
Start: 2024-12-27 | End: 2024-12-30 | Stop reason: HOSPADM

## 2024-12-27 RX ORDER — HEPARIN SODIUM,PORCINE/D5W 25000/250
0-40 INTRAVENOUS SOLUTION INTRAVENOUS CONTINUOUS
Status: DISCONTINUED | OUTPATIENT
Start: 2024-12-27 | End: 2024-12-27

## 2024-12-27 RX ORDER — PROCHLORPERAZINE EDISYLATE 5 MG/ML
5 INJECTION INTRAMUSCULAR; INTRAVENOUS EVERY 6 HOURS PRN
Status: DISCONTINUED | OUTPATIENT
Start: 2024-12-27 | End: 2024-12-30 | Stop reason: HOSPADM

## 2024-12-27 RX ORDER — OXYCODONE HYDROCHLORIDE 10 MG/1
10 TABLET ORAL EVERY 6 HOURS PRN
Status: DISCONTINUED | OUTPATIENT
Start: 2024-12-27 | End: 2024-12-30 | Stop reason: HOSPADM

## 2024-12-27 RX ORDER — NALOXONE HCL 0.4 MG/ML
0.02 VIAL (ML) INJECTION
Status: DISCONTINUED | OUTPATIENT
Start: 2024-12-27 | End: 2024-12-30 | Stop reason: HOSPADM

## 2024-12-27 RX ORDER — HYDRALAZINE HYDROCHLORIDE 25 MG/1
100 TABLET, FILM COATED ORAL
Status: COMPLETED | OUTPATIENT
Start: 2024-12-27 | End: 2024-12-27

## 2024-12-27 RX ORDER — NITROGLYCERIN 0.4 MG/1
0.4 TABLET SUBLINGUAL EVERY 5 MIN PRN
Status: COMPLETED | OUTPATIENT
Start: 2024-12-27 | End: 2024-12-27

## 2024-12-27 RX ORDER — HEPARIN SODIUM,PORCINE/D5W 25000/250
0-40 INTRAVENOUS SOLUTION INTRAVENOUS CONTINUOUS
Status: DISCONTINUED | OUTPATIENT
Start: 2024-12-27 | End: 2024-12-28

## 2024-12-27 RX ORDER — SIMETHICONE 80 MG
1 TABLET,CHEWABLE ORAL 4 TIMES DAILY PRN
Status: DISCONTINUED | OUTPATIENT
Start: 2024-12-27 | End: 2024-12-30 | Stop reason: HOSPADM

## 2024-12-27 RX ORDER — NITROGLYCERIN 0.4 MG/1
0.4 TABLET SUBLINGUAL EVERY 5 MIN PRN
Status: DISCONTINUED | OUTPATIENT
Start: 2024-12-27 | End: 2024-12-30 | Stop reason: HOSPADM

## 2024-12-27 RX ORDER — HYDRALAZINE HYDROCHLORIDE 50 MG/1
100 TABLET, FILM COATED ORAL EVERY 8 HOURS
Status: DISCONTINUED | OUTPATIENT
Start: 2024-12-27 | End: 2024-12-29

## 2024-12-27 RX ORDER — ASPIRIN 81 MG/1
81 TABLET ORAL DAILY
Status: DISCONTINUED | OUTPATIENT
Start: 2024-12-27 | End: 2024-12-30 | Stop reason: HOSPADM

## 2024-12-27 RX ORDER — INSULIN ASPART 100 [IU]/ML
0-5 INJECTION, SOLUTION INTRAVENOUS; SUBCUTANEOUS
Status: DISCONTINUED | OUTPATIENT
Start: 2024-12-27 | End: 2024-12-30 | Stop reason: HOSPADM

## 2024-12-27 RX ORDER — ALUMINUM HYDROXIDE, MAGNESIUM HYDROXIDE, AND SIMETHICONE 1200; 120; 1200 MG/30ML; MG/30ML; MG/30ML
30 SUSPENSION ORAL 4 TIMES DAILY PRN
Status: DISCONTINUED | OUTPATIENT
Start: 2024-12-27 | End: 2024-12-30 | Stop reason: HOSPADM

## 2024-12-27 RX ORDER — IBUPROFEN 200 MG
16 TABLET ORAL
Status: DISCONTINUED | OUTPATIENT
Start: 2024-12-27 | End: 2024-12-30 | Stop reason: HOSPADM

## 2024-12-27 RX ORDER — CINACALCET 30 MG/1
60 TABLET, FILM COATED ORAL
Status: DISCONTINUED | OUTPATIENT
Start: 2024-12-27 | End: 2024-12-30 | Stop reason: HOSPADM

## 2024-12-27 RX ORDER — BISACODYL 10 MG/1
10 SUPPOSITORY RECTAL DAILY PRN
Status: DISCONTINUED | OUTPATIENT
Start: 2024-12-27 | End: 2024-12-30 | Stop reason: HOSPADM

## 2024-12-27 RX ORDER — IPRATROPIUM BROMIDE AND ALBUTEROL SULFATE 2.5; .5 MG/3ML; MG/3ML
3 SOLUTION RESPIRATORY (INHALATION) EVERY 4 HOURS PRN
Status: DISCONTINUED | OUTPATIENT
Start: 2024-12-27 | End: 2024-12-30 | Stop reason: HOSPADM

## 2024-12-27 RX ORDER — EZETIMIBE 10 MG/1
10 TABLET ORAL DAILY
Status: DISCONTINUED | OUTPATIENT
Start: 2024-12-27 | End: 2024-12-30 | Stop reason: HOSPADM

## 2024-12-27 RX ORDER — NIFEDIPINE 30 MG/1
90 TABLET, EXTENDED RELEASE ORAL
Status: COMPLETED | OUTPATIENT
Start: 2024-12-27 | End: 2024-12-27

## 2024-12-27 RX ORDER — ASPIRIN 325 MG
325 TABLET ORAL ONCE
Status: COMPLETED | OUTPATIENT
Start: 2024-12-27 | End: 2024-12-27

## 2024-12-27 RX ORDER — CLOPIDOGREL BISULFATE 75 MG/1
75 TABLET ORAL DAILY
Status: DISCONTINUED | OUTPATIENT
Start: 2024-12-27 | End: 2024-12-30 | Stop reason: HOSPADM

## 2024-12-27 RX ORDER — MINOXIDIL 2.5 MG/1
10 TABLET ORAL 2 TIMES DAILY
Status: DISCONTINUED | OUTPATIENT
Start: 2024-12-27 | End: 2024-12-30 | Stop reason: HOSPADM

## 2024-12-27 RX ORDER — SODIUM CHLORIDE 9 MG/ML
INJECTION, SOLUTION INTRAVENOUS ONCE
Status: COMPLETED | OUTPATIENT
Start: 2024-12-27 | End: 2024-12-27

## 2024-12-27 RX ORDER — RANOLAZINE 500 MG/1
500 TABLET, EXTENDED RELEASE ORAL DAILY
Status: DISCONTINUED | OUTPATIENT
Start: 2024-12-27 | End: 2024-12-27

## 2024-12-27 RX ORDER — ISOSORBIDE MONONITRATE 60 MG/1
120 TABLET, EXTENDED RELEASE ORAL DAILY
Status: DISCONTINUED | OUTPATIENT
Start: 2024-12-28 | End: 2024-12-30 | Stop reason: HOSPADM

## 2024-12-27 RX ADMIN — NITROGLYCERIN 0.4 MG: 0.4 TABLET, ORALLY DISINTEGRATING SUBLINGUAL at 02:12

## 2024-12-27 RX ADMIN — CLONIDINE HYDROCHLORIDE 0.1 MG: 0.1 TABLET ORAL at 08:12

## 2024-12-27 RX ADMIN — ONDANSETRON 4 MG: 2 INJECTION INTRAMUSCULAR; INTRAVENOUS at 10:12

## 2024-12-27 RX ADMIN — ATORVASTATIN CALCIUM 80 MG: 40 TABLET, FILM COATED ORAL at 08:12

## 2024-12-27 RX ADMIN — SODIUM CHLORIDE: 9 INJECTION, SOLUTION INTRAVENOUS at 01:12

## 2024-12-27 RX ADMIN — NITROGLYCERIN 0.4 MG: 0.4 TABLET, ORALLY DISINTEGRATING SUBLINGUAL at 04:12

## 2024-12-27 RX ADMIN — ASPIRIN 81 MG: 81 TABLET, COATED ORAL at 09:12

## 2024-12-27 RX ADMIN — HYDRALAZINE HYDROCHLORIDE 100 MG: 50 TABLET ORAL at 11:12

## 2024-12-27 RX ADMIN — ASPIRIN 325 MG ORAL TABLET 325 MG: 325 PILL ORAL at 06:12

## 2024-12-27 RX ADMIN — CINACALCET HYDROCHLORIDE 60 MG: 30 TABLET, FILM COATED ORAL at 08:12

## 2024-12-27 RX ADMIN — SEVELAMER CARBONATE 1600 MG: 800 TABLET, FILM COATED ORAL at 12:12

## 2024-12-27 RX ADMIN — CARVEDILOL 50 MG: 25 TABLET, FILM COATED ORAL at 05:12

## 2024-12-27 RX ADMIN — RANOLAZINE 1000 MG: 500 TABLET, EXTENDED RELEASE ORAL at 05:12

## 2024-12-27 RX ADMIN — MINOXIDIL 10 MG: 10 TABLET ORAL at 11:12

## 2024-12-27 RX ADMIN — EZETIMIBE 10 MG: 10 TABLET ORAL at 08:12

## 2024-12-27 RX ADMIN — MORPHINE SULFATE 4 MG: 4 INJECTION INTRAVENOUS at 04:12

## 2024-12-27 RX ADMIN — HYDRALAZINE HYDROCHLORIDE 100 MG: 50 TABLET ORAL at 01:12

## 2024-12-27 RX ADMIN — SEVELAMER CARBONATE 1600 MG: 800 TABLET, FILM COATED ORAL at 05:12

## 2024-12-27 RX ADMIN — HYDRALAZINE HYDROCHLORIDE 100 MG: 25 TABLET ORAL at 06:12

## 2024-12-27 RX ADMIN — ISOSORBIDE MONONITRATE 120 MG: 60 TABLET, EXTENDED RELEASE ORAL at 06:12

## 2024-12-27 RX ADMIN — HEPARIN SODIUM AND DEXTROSE 12 UNITS/KG/HR: 10000; 5 INJECTION INTRAVENOUS at 08:12

## 2024-12-27 RX ADMIN — NIFEDIPINE 90 MG: 30 TABLET, FILM COATED, EXTENDED RELEASE ORAL at 06:12

## 2024-12-27 RX ADMIN — OXYCODONE HYDROCHLORIDE 10 MG: 10 TABLET ORAL at 10:12

## 2024-12-27 RX ADMIN — MINOXIDIL 10 MG: 10 TABLET ORAL at 09:12

## 2024-12-27 RX ADMIN — CARVEDILOL 50 MG: 12.5 TABLET, FILM COATED ORAL at 06:12

## 2024-12-27 RX ADMIN — NITROGLYCERIN 0.4 MG: 0.4 TABLET, ORALLY DISINTEGRATING SUBLINGUAL at 01:12

## 2024-12-27 RX ADMIN — NITROGLYCERIN 0.4 MG: 0.4 TABLET, ORALLY DISINTEGRATING SUBLINGUAL at 07:12

## 2024-12-27 RX ADMIN — CLOPIDOGREL BISULFATE 75 MG: 75 TABLET ORAL at 08:12

## 2024-12-27 NOTE — ASSESSMENT & PLAN NOTE
CAD  - Acute, patient reports chest pain is ongoing/resolved.  - EKG with nonspecific ST changes  - CXR with heart size upper limit of normal or mildly enlarged. Mild accentuation of the interstitial markings. No significant airspace consolidation or pleural effusion identified   - troponin 240>364>561  - ASA administered with EMS  - Continue ASA, statin, plavix, BB  - Cardiac monitoring  - PRN EKG and sl nitro for chest pain  - K>4, Mg>2  - Previous cardiac testing with multi vessel CAD treated medically with ACS   - ACS protocol initiated  - cardiology consulted; appreciate assistance   - will keep NPO as precautions

## 2024-12-27 NOTE — ASSESSMENT & PLAN NOTE
Slightly differing history given to providers, however not overall c/w cardiac chest pain given constant nature, reported improvement with exertion, and no associated dyspnea on exertion/shortness of breath. Rising HST likely related to HTN urgency and ESRD and baseline troponin elevation. EKG with TW flattening but otherwise no ST changes or other evidence of ischemia. Known severe 3vd with RCA .     Needs BP optimization. Not a candidate for LHC given fixed perfusion defects.     - HD for volume removal  - Resume home antihypertensive regimen  - Discuss with Nephro resuming Minoxidil versus Clonidine (prev stopped Minoxidil d/t low BP noted during HD sessions)  - DAPT

## 2024-12-27 NOTE — ASSESSMENT & PLAN NOTE
Patient's blood pressure range in the last 24 hours was: BP  Min: 162/91  Max: 211/102.The patient's inpatient anti-hypertensive regimen is listed below:  Current Antihypertensives  nitroGLYCERIN SL tablet 0.4 mg, Every 5 min PRN, Sublingual  carvediloL tablet 50 mg, 2 times daily with meals, Oral  hydrALAZINE tablet 100 mg, Every 8 hours, Oral  isosorbide mononitrate 24 hr tablet 120 mg, Daily, Oral  minoxidiL tablet 10 mg, 2 times daily, Oral  NIFEdipine 24 hr tablet 90 mg, Daily, Oral  nitroGLYCERIN SL tablet 0.4 mg, Every 5 min PRN, Sublingual  ranolazine 12 hr tablet 500 mg, Daily, Oral    Plan  - BP is uncontrolled, will adjust as follows: continue home meds  - needs HD

## 2024-12-27 NOTE — ED TRIAGE NOTES
Haroldo Dickinson, a 49 y.o. male presents to the ED w/ complaint of L sided CP radiating down L arm x2 hrs. Pt has cardiac hx, states that this feels similar to previous MI. Denies SOB. Took 1 Nitro PTA.    Triage note:  Chief Complaint   Patient presents with    Chest Pain     Patient reports CP that began 2 hours ago. States that it is in the R chest. Reports that he has had pains similar in the past.     Review of patient's allergies indicates:   Allergen Reactions    No known allergies      Past Medical History:   Diagnosis Date    CAD (coronary artery disease), native coronary artery 11/21/2019    Cataract     Diabetes mellitus     Diabetic retinopathy     Dialysis patient     DM type 2 causing renal disease, not at goal     ESRD (end stage renal disease) started dialysis 01/2014 06/05/2014    History of colon polyps 02/10/2021    History of COVID-19 12/29/2022    Hyperparathyroidism, secondary renal 06/05/2014    Hypertension     NSTEMI (non-ST elevated myocardial infarction) 12/21/2013    Organ transplant candidate 06/05/2014    Palliative care encounter 10/29/2024    Pleural effusion 06/07/2024    Pneumonia     Post PTCA 08/26/2020    RCA [facility] 7-25-20    Renal cell carcinoma of right kidney     Renal hypertension     Stroke

## 2024-12-27 NOTE — ASSESSMENT & PLAN NOTE
49 y.o. Black or  Male ESRD-HD M-W-F presents to ED on 12/27/2024 with chest pain.  Nephrology consulted for inpatient ESRD-HD management    Outpatient HD Information:  -Dialysis modality: Hemodialysis  -Outpatient HD unit: [facility]  -Nephrologist: Manny SIMPSON  -HD TX days: Monday/Wednesday/Friday, duration of treatment: 3-4 hrs   -Dialysis access: RUE AV fistula   -Residual urine: Minium   -EDW: 69.9 kg    Assessment:   - Will provide dialysis today (12/27/2024) with UF - 2L as BP tolerates for metabolic clearance and volume management   - Labs reviewed and dialysate to be adjusted to current labs.   - Continue to monitor intake and output  - Please avoid gadolinium, fleets, phos-based laxatives, NSAIDs  - Dialysis thrice weekly unless more urgent indications arise. Will evaluate RRT requirements Daily.    Anemia of ESRD   Recent Labs   Lab 12/27/24  0356   WBC 10.22   HGB 10.5*   HCT 32.9*        Lab Results   Component Value Date    FESATURATED 66 (H) 11/27/2023    FERRITIN 1,531 (H) 11/27/2023       - Goal in ESRD is Hgb of 10-11. Hgb 10.5.  - EPO can be administered and dosed per his OP unit upon discharge.  - if patient is on iron infusions please D/C when active infection, cautiously use EPO when hx of malignancy, high BP (SBP usually > 170mmhg).    Mineral Bone Disease in ESRD   Lab Results   Component Value Date     (H) 12/11/2024    CALCIUM 9.3 12/27/2024    ALBUMIN 3.4 (L) 12/27/2024    PHOS 5.3 (H) 11/02/2024       - F/U PO4, Mg, Calcium. And albumin levels daily.   - Renal diet with protein intake goal 1.5 g/kg/d with 1 L fluid restriction   - If patient has poor oral intake, recommend nephro nutritional shakes (ex Novasource)  - Continue or restart home phos binder, will add phos level

## 2024-12-27 NOTE — PLAN OF CARE
[facility]  Initial Discharge Assessment       Primary Care Provider: Mireya Larose MD    Admission Diagnosis: ESRD (end stage renal disease) [N18.6]  Chest pain [R07.9]  Hypervolemia, unspecified hypervolemia type [E87.70]    Admission Date: 12/27/2024  Expected Discharge Date: 12/28/2024         Payor: HUMANA MANAGED MEDICARE / Plan: HU[employer]NA SNP HMO PPO SPECIAL NEEDS / Product Type: Medicare Advantage /     Extended Emergency Contact Information  Primary Emergency Contact: Gabby Dickinson  Address: [address] [place] [address] [place]  Mobile Phone: [phone]  Relation: Mother  Secondary Emergency Contact: Ariadne Dickinson  Mobile Phone: [phone]  Relation: Sister    Discharge Plan A: (P) Home  Discharge Plan B: (P) Home      [id number] - [place], [place] - [address]  [address] [facility] [address]  Phone: [phone] Fax: [phone]                 SW completed Discharge Planning Assessment with patient via bedside. Discharge planning booklet given to patient/family and whiteboard updated with QUIN and phone #. All questions answered.     Patient reported that he may need assistance with transportation upon discharge.      Patient reported that he live alone, and prior to hospitalization he was independent with his ADL's. Patient reported that he uses a cane and walker. Patient reported that he goes to [facility] on Mon, Wed, and Fri for dialysis. Patient reported that he does not go to a Coumadin clinic.       Patient lives in an apartment complex with 0 steps to enter.      Discharge Plan A and Plan B have been determined by review of patient's clinical status, future medical and therapeutic needs, and coverage/benefits for post-acute care in coordination with multidisciplinary team members.        Genevieve Kidd LMSW  [facility]  Ext. [phone]

## 2024-12-27 NOTE — HPI
The patient is a 49 y.o. Black or  Male with multiple co morbidities including mutli vessel CAD, HTN, ESRD on HD MWF, and HLD who presents to ED on 12/27/2024 with complaints of CP. Admitted to  for ACS rule out. Patient reports left sided chest pressure that radiates to his substernal area with associated SOB. Reports that these symptoms are typical of his usually chest pain, but states today was more intensified. EMS was called, and patient given ASA and nitro en route with improvement of symptoms. Endorses compliance with medications and HD. Denies fever/chills, diaphoresis, lightheadedness, HA, cough, congestion, abdominal pain, n/v/d, urinary symptoms, changes in BMs, numbness/tingling, weakness.     In the ED, patient hypertensive to max 208/101. Remaining vitals stable. BNP 1322. HS troponin 240>364. K 4.1. Hgb 10.5. EKG with NSR and nonspecific ST changes. Given home antihypertensive meds (coreg, hydralazine, imdur, nifedipine) with mild improvement of BP. Given morphine and nitro x1 in the ED with temporary improvement of pain. HD completed Tuesday, 12/24/25, due to holiday schedule. Nephrology consulted for HD.

## 2024-12-27 NOTE — HOSPITAL COURSE
Mr. Dickinson was admitted to hospital medicine for NSTEMI, thought to be secondary to demand from hypertensive emergency and hypervolemia in the setting of ESRD. EKG with nonspecific ST changes. Pt was dialyzed on 12/27 with significant improvement in BP and volume status. Pt with intermittent and ongoing chest pain and troponin persistently increasing, now up to >18,000. Cardiology and interventional cardiology consulted and report the patient's known CAD is not amenable to PCI or CABG & recommend increasing ranolazine and adding imdur to daily regimen. ACS protocol with heparin gtts initiated by hospital medicine which was continued for ~72 hrs.    Pt was seen and evaluated by me this morning, reports feeling well, and is eager to discharge home. All questions were answered. Patient acknowledged understanding of discharge instructions and feels safe to discharge home. Patient was discharged on 1/1/2025 in stable condition with cardiology & interventional cardiology follow-up. Education regarding condition provided and return precautions given.     Physical Exam  Gen: in NAD, appears stated age  Neuro: AAOx3, motor, sensory, and strength grossly intact BL  HEENT: EOMI, PERRLA; no JVD appreciated  CVS: RRR, no m/r/g  Resp: lungs CTAB, no w/r/r; no belabored breathing or accessory muscle use appreciated   Abd: NTND, soft to palpation  Extrem: no UE or LE edema BL

## 2024-12-27 NOTE — ASSESSMENT & PLAN NOTE
Patient has a current diagnosis of Hypertensive emergency with end organ damage evidenced by acute coronary syndrome which is uncontrolled.  Latest blood pressure and vitals reviewed-   Temp:  [97.6 °F (36.4 °C)-98.2 °F (36.8 °C)]   Pulse:  [67-79]   Resp:  [14-19]   BP: (162-211)/()   SpO2:  [95 %-100 %] .   Patient currently off IV antihypertensives.   Home meds for hypertension were reviewed and noted below.   Hypertension Medications               carvediloL (COREG) 25 MG tablet Take 2 tablets (50 mg total) by mouth 2 (two) times daily with meals.    hydrALAZINE (APRESOLINE) 100 MG tablet Take 1 tablet (100 mg total) by mouth every 8 (eight) hours.    isosorbide mononitrate (IMDUR) 120 MG 24 hr tablet Take 1 tablet (120 mg total) by mouth once daily.    minoxidiL (LONITEN) 10 MG Tab Take 1 tablet (10 mg total) by mouth 2 (two) times daily.    NIFEdipine (PROCARDIA-XL) 90 MG (OSM) 24 hr tablet Take 1 tablet (90 mg total) by mouth once daily.    nitroGLYCERIN (NITROSTAT) 0.4 MG SL tablet Place 1 tablet (0.4 mg total) under the tongue every 5 (five) minutes as needed for Chest pain.            Medication adjustment for hospital antihypertensives is as follows- continue home meds and HD    Will aim for controlled BP reduction by medications noted above. Monitor and mitigate end organ damage as indicated.

## 2024-12-27 NOTE — ASSESSMENT & PLAN NOTE
Patient is identified as having Diastolic (HFpEF) heart failure that is Acute on Chronic. CHF is currently uncontrolled due to volume overload due to: Continued edema of extremities. Latest ECHO performed and demonstrates- Results for orders placed during the hospital encounter of 10/28/24    Echo    Interpretation Summary    Left Ventricle: The left ventricle is normal in size. Increased ventricular mass. Normal wall thickness. There is eccentric hypertrophy. Regional wall motion abnormalities present. See diagram for wall motion findings. There is mildly reduced systolic function with a visually estimated ejection fraction of 40 - 45%. There is diastolic dysfunction.    Right Ventricle: Normal right ventricular cavity size. Wall thickness is normal. Systolic function is normal.    Left Atrium: Left atrium is severely dilated.    Aortic Valve: There is moderate aortic valve sclerosis. There is annular calcification present. Cannot exclude bileaflet aortic valve. Mildly restricted motion.    Mitral Valve: There is moderate posterior mitral annular calcification present. There is moderate to severe regurgitation.    Tricuspid Valve: There is mild regurgitation.    Pulmonary Artery: The estimated pulmonary artery systolic pressure is 61 mmHg.    IVC/SVC: Normal venous pressure at 3 mmHg.    Pericardium: There is a small posterior effusion.  . Continue Beta Blocker ACE/ARB and monitor clinical status closely. Monitor on telemetry. Patient is off CHF pathway.  Monitor strict Is&Os and daily weights.  Place on fluid restriction of 1.5 L. Cardiology is consulted. Continue to stress to patient importance of self efficacy and  on diet for CHF. Last BNP reviewed- and noted below   Recent Labs   Lab 12/27/24  0356   BNP 1,322*   .  - Nephrology consulted for HD for volume removal

## 2024-12-27 NOTE — ASSESSMENT & PLAN NOTE
Patient with known CAD s/p stent placement, which is controlled Will continue ASA, Plavix, and Statin and monitor for S/Sx of angina/ACS. Continue to monitor on telemetry.     - Cotninue DAPT  - No LHC - discussed with patient that he is not a candidate due to multiple fixed perfusion defects not amenable to percutaneous reperfusion therapy

## 2024-12-27 NOTE — HPI
Haroldo Dickinson is a 49 y.o. male with PMHx significant for mutli vessel CAD, HTN, ESRD on HD MWF, HLD admitted to  for ACS rule out. Patient reports left sided chest pressure that radiates to his substernal area with associated SOB. Reports that these symptoms are typical of his usually chest pain, but states today was more intensified. EMS was called, and patient given ASA and nitro en route with improvement of symptoms. Endorses compliance with medications and HD. Denies fever/chills, diaphoresis, lightheadedness, HA, cough, congestion, abdominal pain, n/v/d, urinary symptoms, changes in BMs, numbness/tingling, weakness. Of note, patient had abnormal PET stress test in October of this year and was ultimately treated with 48 hours of ACS.     In the ED, patient hypertensive to max 208/101. Remaining vitals stable. BNP 1322. HS troponin 240>364. K 4.1. Hgb 10.5. EKG with NSR and nonspecific ST changes. Given home antihypertensive meds (coreg, hydralazine, imdur, nifedipine) with mild improvement of BP. Given morphine and nitro x1 in the ED with temporary improvement of pain. Nephrology consulted for HD.

## 2024-12-27 NOTE — ED NOTES
Telemetry Verification   Patient placed on Telemetry Box    Box # [id number]   Monitor Tech EDOU   Rate 69   Rhythm NSR

## 2024-12-27 NOTE — SUBJECTIVE & OBJECTIVE
Past Medical History:   Diagnosis Date    CAD (coronary artery disease), native coronary artery 11/21/2019    Cataract     Diabetes mellitus     Diabetic retinopathy     Dialysis patient     DM type 2 causing renal disease, not at goal     ESRD (end stage renal disease) started dialysis 01/2014 06/05/2014    History of colon polyps 02/10/2021    History of COVID-19 12/29/2022    Hyperparathyroidism, secondary renal 06/05/2014    Hypertension     NSTEMI (non-ST elevated myocardial infarction) 12/21/2013    Organ transplant candidate 06/05/2014    Palliative care encounter 10/29/2024    Pleural effusion 06/07/2024    Pneumonia     Post PTCA 08/26/2020    RCA [facility] 7-25-20    Renal cell carcinoma of right kidney     Renal hypertension     Stroke        Past Surgical History:   Procedure Laterality Date    ANGIOGRAM, CORONARY, WITH LEFT HEART CATHETERIZATION N/A 7/1/2021    Procedure: Angiogram, Coronary, with Left Heart Cath;  Surgeon: Miki Zendejas MD;  Location: [facility] CATH LAB;  Service: Cardiology;  Laterality: N/A;    ANGIOGRAM, CORONARY, WITH LEFT HEART CATHETERIZATION N/A 11/28/2023    Procedure: Angiogram, Coronary, with Left Heart Cath;  Surgeon: Noah Pendleton MD;  Location: [facility] CATH LAB;  Service: Cardiology;  Laterality: N/A;    COLONOSCOPY N/A 5/3/2017    Procedure: COLONOSCOPY;  Surgeon: Rufino Carpenter MD;  Location: [facility] (4TH FLR);  Service: Endoscopy;  Laterality: N/A;  pt states can only schedule on Wednesdays    COLONOSCOPY N/A 2/9/2021    Procedure: COLONOSCOPY;  Surgeon: Edil Wong MD;  Location: [facility] (4TH FLR);  Service: Endoscopy;  Laterality: N/A;  Dialysis MWF/ labwork day of procedure  right arm aceess  per Dr. Colin pt can hold Plavix 5 days prior see note- sm  COVID test on 2/6/21 at [facility] - sm    COLONOSCOPY N/A 2/10/2021    Procedure: COLONOSCOPY;  Surgeon: Robert Ayers MD;  Location: [facility] (4TH FLR);  Service: Endoscopy;  Laterality: N/A;  rescheduled due to poor  bowel prep-BB  negative covid screening 2/6/21-BB  dialysis M-W-F-BB  okay to r/s for 2/9/21 and to hold Plavix per Dr. KIRAN Wong-BB  labs same day-BB    CORONARY STENT PLACEMENT N/A 7/25/2019    Procedure: INSERTION, STENT, CORONARY ARTERY;  Surgeon: Miki Zendejas MD;  Location: [facility] CATH LAB;  Service: Cardiology;  Laterality: N/A;    DIALYSIS FISTULA CREATION      FISTULOGRAM N/A 2/18/2019    Procedure: Fistulogram;  Surgeon: Yaneth De La Cruz MD;  Location: [facility] CATH LAB;  Service: Cardiology;  Laterality: N/A;    FISTULOGRAM Right 7/23/2019    Procedure: Fistulogram;  Surgeon: Oz Cordoba MD;  Location: [facility] CATH LAB;  Service: Cardiology;  Laterality: Right;    LAPAROSCOPIC ROBOT-ASSISTED SURGICAL REMOVAL OF KIDNEY USING DA JAIME XI Right 5/19/2022    Procedure: XI ROBOTIC NEPHRECTOMY;  Surgeon: Aidan Hendricks MD;  Location: [address];  Service: Urology;  Laterality: Right;  3hrs    LAPAROSCOPIC ROBOT-ASSISTED SURGICAL REMOVAL OF KIDNEY USING DA JAIME XI Left 1/5/2023    Procedure: XI ROBOTIC NEPHRECTOMY;  Surgeon: Aidan Hendricks MD;  Location: [address];  Service: Urology;  Laterality: Left;  4 hrs    LEFT HEART CATHETERIZATION Left 7/25/2019    Procedure: Left heart cath;  Surgeon: Miki Zendejas MD;  Location: [facility] CATH LAB;  Service: Cardiology;  Laterality: Left;    REPAIR  1/5/2023    Procedure: REPAIR; COLOTOMY;  Surgeon: Maikel Lamb MD;  Location: [address];  Service: General;;    RETINAL LASER PROCEDURE Bilateral 2018 or 2017    Dr. Rothman    VASCULAR SURGERY         Review of patient's allergies indicates:   Allergen Reactions    No known allergies        Current Facility-Administered Medications on File Prior to Encounter   Medication    lidocaine (PF) 10 mg/ml (1%) injection 10 mg     Current Outpatient Medications on File Prior to Encounter   Medication Sig    aspirin (ECOTRIN) 81 MG EC tablet Take 1 tablet (81 mg total) by mouth once daily.     atorvastatin (LIPITOR) 80 MG tablet Take 1 tablet (80 mg total) by mouth once daily.    carvediloL (COREG) 25 MG tablet Take 2 tablets (50 mg total) by mouth 2 (two) times daily with meals.    ciclopirox (PENLAC) 8 % Soln Apply topically nightly.    clopidogreL (PLAVIX) 75 mg tablet Take 1 tablet (75 mg total) by mouth once daily.    epoetin philip (PROCRIT) 4,000 unit/mL injection Inject 1.93 mLs (7,720 Units total) into the skin every Mon, Wed, Fri.    ezetimibe (ZETIA) 10 mg tablet Take 1 tablet (10 mg total) by mouth once daily.    hydrALAZINE (APRESOLINE) 100 MG tablet Take 1 tablet (100 mg total) by mouth every 8 (eight) hours.    isosorbide mononitrate (IMDUR) 120 MG 24 hr tablet Take 1 tablet (120 mg total) by mouth once daily.    methoxy peg-epoetin beta (MIRCERA INJ) 150 mcg.    minoxidiL (LONITEN) 10 MG Tab Take 1 tablet (10 mg total) by mouth 2 (two) times daily.    NIFEdipine (PROCARDIA-XL) 90 MG (OSM) 24 hr tablet Take 1 tablet (90 mg total) by mouth once daily.    nitroGLYCERIN (NITROSTAT) 0.4 MG SL tablet Place 1 tablet (0.4 mg total) under the tongue every 5 (five) minutes as needed for Chest pain.    polyethylene glycol (GLYCOLAX) 17 gram/dose powder Take 17 g by mouth daily as needed for Constipation.    ranolazine (RANEXA) 500 MG Tb12 Take 1 tablet (500 mg total) by mouth Daily.    SENSIPAR 60 mg Tab Take 60 mg by mouth daily with breakfast.    sevelamer carbonate (RENVELA) 800 mg Tab Take 2 tablets (1,600 mg total) by mouth 3 (three) times daily with meals.    vit B complex-C-folic ac-zinc (RENAPLEX) 800 mcg- 12.5 mg Tab Take 1 tablet by mouth.    XPHOZAH 20 mg Tab Take 20 mg by mouth 2 (two) times a day.     Family History       Problem Relation (Age of Onset)    Cancer Maternal Grandfather    Cataracts Mother    Diabetes Mother    Heart disease Brother    Hypertension Mother, Father, Sister, Brother    Kidney disease Brother          Tobacco Use    Smoking status: Never    Smokeless tobacco:  Never   Substance and Sexual Activity    Alcohol use: Not Currently     Comment: One drink a month    Drug use: No    Sexual activity: Yes     Partners: Female     Birth control/protection: None     Review of Systems   Constitutional:  Negative for activity change, chills and fever.   HENT:  Negative for trouble swallowing.    Eyes:  Negative for photophobia and visual disturbance.   Respiratory:  Positive for shortness of breath. Negative for chest tightness and wheezing.    Cardiovascular:  Positive for chest pain. Negative for palpitations and leg swelling.   Gastrointestinal:  Negative for abdominal pain, constipation, diarrhea, nausea and vomiting.   Genitourinary:  Negative for dysuria, frequency, hematuria and urgency.   Musculoskeletal:  Negative for arthralgias, back pain and gait problem.   Skin:  Negative for color change and rash.   Neurological:  Negative for dizziness, syncope, weakness, light-headedness, numbness and headaches.   Psychiatric/Behavioral:  Negative for agitation and confusion. The patient is not nervous/anxious.      Objective:     Vital Signs (Most Recent):  Temp: 97.6 °F (36.4 °C) (12/27/24 0733)  Pulse: 72 (12/27/24 0757)  Resp: 19 (12/27/24 0733)  BP: (!) 182/90 (12/27/24 0757)  SpO2: 96 % (12/27/24 0757) Vital Signs (24h Range):  Temp:  [97.6 °F (36.4 °C)-98.2 °F (36.8 °C)] 97.6 °F (36.4 °C)  Pulse:  [67-79] 72  Resp:  [14-19] 19  SpO2:  [95 %-100 %] 96 %  BP: (162-211)/() 182/90     Weight: 77.1 kg (170 lb)  Body mass index is 26.63 kg/m².     Physical Exam  Vitals and nursing note reviewed.   Constitutional:       General: He is not in acute distress.     Appearance: Normal appearance. He is not ill-appearing.   HENT:      Head: Normocephalic and atraumatic.      Right Ear: External ear normal.      Left Ear: External ear normal.   Eyes:      Extraocular Movements: Extraocular movements intact.      Conjunctiva/sclera: Conjunctivae normal.      Pupils: Pupils are equal,  round, and reactive to light.   Cardiovascular:      Rate and Rhythm: Normal rate and regular rhythm.      Pulses: Normal pulses.      Heart sounds: Normal heart sounds. No murmur heard.  Pulmonary:      Effort: Pulmonary effort is normal. No respiratory distress.      Breath sounds: Rales present.   Abdominal:      General: Abdomen is flat. Bowel sounds are normal.      Tenderness: There is no abdominal tenderness.   Musculoskeletal:         General: Normal range of motion.      Cervical back: Normal range of motion and neck supple.      Right lower leg: No edema.      Left lower leg: No edema.   Skin:     General: Skin is warm and dry.      Capillary Refill: Capillary refill takes less than 2 seconds.      Coloration: Skin is not jaundiced.   Neurological:      General: No focal deficit present.      Mental Status: He is alert and oriented to person, place, and time. Mental status is at baseline.      Cranial Nerves: No cranial nerve deficit.   Psychiatric:         Mood and Affect: Mood normal.         Behavior: Behavior normal.              CRANIAL NERVES     CN III, IV, VI   Pupils are equal, round, and reactive to light.       Significant Labs: All pertinent labs within the past 24 hours have been reviewed.  BMP:   Recent Labs   Lab 12/27/24  0356   *      K 4.1      CO2 20*   BUN 68*   CREATININE 11.9*   CALCIUM 9.3     CBC:   Recent Labs   Lab 12/27/24 0356   WBC 10.22   HGB 10.5*   HCT 32.9*        CMP:   Recent Labs   Lab 12/27/24  0356      K 4.1      CO2 20*   *   BUN 68*   CREATININE 11.9*   CALCIUM 9.3   PROT 7.1   ALBUMIN 3.4*   BILITOT 0.7   ALKPHOS 69   AST 11   ALT 8*   ANIONGAP 16       Significant Imaging: I have reviewed all pertinent imaging results/findings within the past 24 hours.    Imaging Results              X-Ray Chest AP Portable (Final result)  Result time 12/27/24 08:51:23      Final result by Edil Currie MD (12/27/24 08:51:23)                    Impression:      See above      Electronically signed by: Edil Currie MD  Date:    12/27/2024  Time:    08:51               Narrative:    EXAMINATION:  XR CHEST AP PORTABLE    CLINICAL HISTORY:  Chest Pain;    TECHNIQUE:  Single frontal view of the chest was performed.    COMPARISON:  No 10/30/2024 ne    FINDINGS:  Heart size upper limit of normal or mildly enlarged.  Mild accentuation of the interstitial markings.  No significant airspace consolidation or pleural effusion identified

## 2024-12-27 NOTE — CONSULTS
[facility]  Nephrology  Consult Note    Patient Name: Haroldo Dickinson  MRN: [id number]  Admission Date: 12/27/2024  Hospital Length of Stay: 0 days  Attending Provider: Omar Marino MD   Primary Care Physician: Mireya Larose MD  Principal Problem:Chest pain    Inpatient consult to Nephrology  Consult performed by: Avis Mancilla, GISSELL, FNP-C  Consult ordered by: Nallely Soliman MD  Reason for consult: ESRD        Subjective:     HPI: The patient is a 49 y.o. Black or  Male with multiple co morbidities including mutli vessel CAD, HTN, ESRD on HD MWF, and HLD who presents to ED on 12/27/2024 with complaints of CP. Admitted to  for ACS rule out. Patient reports left sided chest pressure that radiates to his substernal area with associated SOB. Reports that these symptoms are typical of his usually chest pain, but states today was more intensified. EMS was called, and patient given ASA and nitro en route with improvement of symptoms. Endorses compliance with medications and HD. Denies fever/chills, diaphoresis, lightheadedness, HA, cough, congestion, abdominal pain, n/v/d, urinary symptoms, changes in BMs, numbness/tingling, weakness.     In the ED, patient hypertensive to max 208/101. Remaining vitals stable. BNP 1322. HS troponin 240>364. K 4.1. Hgb 10.5. EKG with NSR and nonspecific ST changes. Given home antihypertensive meds (coreg, hydralazine, imdur, nifedipine) with mild improvement of BP. Given morphine and nitro x1 in the ED with temporary improvement of pain. HD completed Tuesday, 12/24/25, due to holiday schedule. Nephrology consulted for HD.    Past Medical History:   Diagnosis Date    CAD (coronary artery disease), native coronary artery 11/21/2019    Cataract     Diabetes mellitus     Diabetic retinopathy     Dialysis patient     DM type 2 causing renal disease, not at goal     ESRD (end stage renal disease) started dialysis 01/2014 06/05/2014    History of colon  polyps 02/10/2021    History of COVID-19 12/29/2022    Hyperparathyroidism, secondary renal 06/05/2014    Hypertension     NSTEMI (non-ST elevated myocardial infarction) 12/21/2013    Organ transplant candidate 06/05/2014    Palliative care encounter 10/29/2024    Pleural effusion 06/07/2024    Pneumonia     Post PTCA 08/26/2020    RCA [facility] 7-25-20    Renal cell carcinoma of right kidney     Renal hypertension     Stroke        Past Surgical History:   Procedure Laterality Date    ANGIOGRAM, CORONARY, WITH LEFT HEART CATHETERIZATION N/A 7/1/2021    Procedure: Angiogram, Coronary, with Left Heart Cath;  Surgeon: Miki Zendejas MD;  Location: [facility] CATH LAB;  Service: Cardiology;  Laterality: N/A;    ANGIOGRAM, CORONARY, WITH LEFT HEART CATHETERIZATION N/A 11/28/2023    Procedure: Angiogram, Coronary, with Left Heart Cath;  Surgeon: Noah Pendleton MD;  Location: [facility] CATH LAB;  Service: Cardiology;  Laterality: N/A;    COLONOSCOPY N/A 5/3/2017    Procedure: COLONOSCOPY;  Surgeon: Rufino Carpenter MD;  [facility]  Service: Endoscopy;  Laterality: N/A;  pt states can only schedule on Wednesdays    COLONOSCOPY N/A 2/9/2021    Procedure: COLONOSCOPY;  Surgeon: Edil Wong MD;  [facility]  Service: Endoscopy;  Laterality: N/A;  Dialysis MWF/ labwork day of procedure  right arm aceess  per Dr. Colin pt can hold Plavix 5 days prior see note- sm  COVID test on 2/6/21 at [facility] -     COLONOSCOPY N/A 2/10/2021    Procedure: COLONOSCOPY;  Surgeon: Robert Ayers MD;  Location: [facility] ENDO (4TH FLR);  Service: Endoscopy;  Laterality: N/A;  rescheduled due to poor bowel prep-BB  negative covid screening 2/6/21-BB  dialysis M-W-F-BB  okay to r/s for 2/9/21 and to hold Plavix per Dr. KIRAN Wong-BB  labs same day-BB    CORONARY STENT PLACEMENT N/A 7/25/2019    Procedure: INSERTION, STENT, CORONARY ARTERY;  Surgeon: Miki Zendejas MD;  Location: [facility] CATH LAB;  Service: Cardiology;   Laterality: N/A;    DIALYSIS FISTULA CREATION      FISTULOGRAM N/A 2/18/2019    Procedure: Fistulogram;  Surgeon: Yaneth De La Cruz MD;  Location: [facility] CATH LAB;  Service: Cardiology;  Laterality: N/A;    FISTULOGRAM Right 7/23/2019    Procedure: Fistulogram;  Surgeon: Oz Cordoba MD;  Location: [facility] CATH LAB;  Service: Cardiology;  Laterality: Right;    LAPAROSCOPIC ROBOT-ASSISTED SURGICAL REMOVAL OF KIDNEY USING DA JAIME XI Right 5/19/2022    Procedure: XI ROBOTIC NEPHRECTOMY;  Surgeon: Aidan Hendricks MD;  Location: [facility] OR [facility] FLR;  Service: Urology;  Laterality: Right;  3hrs    LAPAROSCOPIC ROBOT-ASSISTED SURGICAL REMOVAL OF KIDNEY USING DA JAIME XI Left 1/5/2023    Procedure: XI ROBOTIC NEPHRECTOMY;  Surgeon: Aidan Hendricks MD;  Location: [facility] OR 2ND FLR;  Service: Urology;  Laterality: Left;  4 hrs    LEFT HEART CATHETERIZATION Left 7/25/2019    Procedure: Left heart cath;  Surgeon: Miki Zendejas MD;  Location: [facility] CATH LAB;  Service: Cardiology;  Laterality: Left;    REPAIR  1/5/2023    Procedure: REPAIR; COLOTOMY;  Surgeon: Maikel Lamb MD;  Location: [facility] OR [facility] FLR;  Service: General;;    RETINAL LASER PROCEDURE Bilateral 2018 or 2017    Dr. Rothman    VASCULAR SURGERY         Review of patient's allergies indicates:   Allergen Reactions    No known allergies      Current Facility-Administered Medications   Medication Frequency    0.9% NaCl infusion Once    acetaminophen tablet 650 mg Q4H PRN    albuterol-ipratropium 2.5 mg-0.5 mg/3 mL nebulizer solution 3 mL Q4H PRN    aluminum-magnesium hydroxide-simethicone 200-200-20 mg/5 mL suspension 30 mL QID PRN    aspirin EC tablet 81 mg Daily    atorvastatin tablet 80 mg Daily    bisacodyL suppository 10 mg Daily PRN    carvediloL tablet 50 mg BID WM    cinacalcet tablet 60 mg Daily with breakfast    clopidogreL tablet 75 mg Daily    dextrose 50% injection 12.5 g PRN    dextrose 50% injection 25 g PRN    ezetimibe tablet 10 mg Daily     glucagon (human recombinant) injection 1 mg PRN    glucose chewable tablet 16 g PRN    glucose chewable tablet 24 g PRN    hydrALAZINE tablet 100 mg Q8H    insulin aspart U-100 pen 0-5 Units QID (AC + HS) PRN    [START ON 12/28/2024] isosorbide mononitrate 24 hr tablet 120 mg Daily    melatonin tablet 6 mg Nightly PRN    minoxidiL tablet 10 mg BID    naloxone 0.4 mg/mL injection 0.02 mg PRN    [START ON 12/28/2024] NIFEdipine 24 hr tablet 90 mg Daily    nitroGLYCERIN SL tablet 0.4 mg Q5 Min PRN    nitroGLYCERIN SL tablet 0.4 mg Q5 Min PRN    ondansetron injection 4 mg Q8H PRN    oxyCODONE immediate release tablet 5 mg Q6H PRN    oxyCODONE immediate release tablet Tab 10 mg Q6H PRN    polyethylene glycol packet 17 g Daily PRN    prochlorperazine injection Soln 5 mg Q6H PRN    ranolazine 12 hr tablet 500 mg Daily    sevelamer carbonate tablet 1,600 mg TID WM    simethicone chewable tablet 80 mg QID PRN    sodium chloride 0.9% flush 10 mL Q8H PRN     Facility-Administered Medications Ordered in Other Encounters   Medication Frequency    lidocaine (PF) 10 mg/ml (1%) injection 10 mg Once     Family History       Problem Relation (Age of Onset)    Cancer Maternal Grandfather    Cataracts Mother    Diabetes Mother    Heart disease Brother    Hypertension Mother, Father, Sister, Brother    Kidney disease Brother          Tobacco Use    Smoking status: Never    Smokeless tobacco: Never   Substance and Sexual Activity    Alcohol use: Not Currently     Comment: One drink a month    Drug use: No    Sexual activity: Yes     Partners: Female     Birth control/protection: None     Review of Systems   Constitutional:  Negative for activity change, chills and fever.   HENT:  Negative for trouble swallowing.    Eyes:  Negative for visual disturbance.   Respiratory:  Positive for shortness of breath. Negative for chest tightness and wheezing.    Cardiovascular:  Positive for chest pain. Negative for palpitations and leg swelling.    Gastrointestinal:  Negative for abdominal pain, constipation, diarrhea, nausea and vomiting.   Genitourinary:  Negative for dysuria, frequency, hematuria and urgency.   Musculoskeletal:  Negative for arthralgias and gait problem.   Skin:  Negative for wound.   Neurological:  Negative for dizziness, syncope, weakness, light-headedness, numbness and headaches.   Psychiatric/Behavioral:  Negative for agitation.      Objective:     Vital Signs (Most Recent):  Temp: 98 °F (36.7 °C) (12/27/24 0952)  Pulse: 75 (12/27/24 1056)  Resp: 20 (12/27/24 1017)  BP: (!) 182/85 (12/27/24 0952)  SpO2: 97 % (12/27/24 0952) Vital Signs (24h Range):  Temp:  [97.6 °F (36.4 °C)-98.2 °F (36.8 °C)] 98 °F (36.7 °C)  Pulse:  [67-79] 75  Resp:  [14-20] 20  SpO2:  [95 %-100 %] 97 %  BP: (162-211)/() 182/85     Weight: 77.1 kg (170 lb) (12/27/24 0234)  Body mass index is 26.63 kg/m².  Body surface area is 1.91 meters squared.    No intake/output data recorded.     Physical Exam  Vitals and nursing note reviewed.   Constitutional:       General: He is not in acute distress.     Appearance: Normal appearance. He is not ill-appearing.   HENT:      Head: Normocephalic and atraumatic.      Right Ear: External ear normal.      Left Ear: External ear normal.   Eyes:      Extraocular Movements: Extraocular movements intact.      Conjunctiva/sclera: Conjunctivae normal.   Cardiovascular:      Rate and Rhythm: Normal rate and regular rhythm.      Pulses: Normal pulses.      Heart sounds: Normal heart sounds.   Pulmonary:      Effort: Pulmonary effort is normal. No respiratory distress.      Breath sounds: Rales present.   Abdominal:      General: Abdomen is flat. Bowel sounds are normal.      Tenderness: There is no abdominal tenderness.   Musculoskeletal:         General: Normal range of motion.      Cervical back: Normal range of motion and neck supple.      Right lower leg: No edema.      Left lower leg: No edema.   Skin:     General: Skin is  warm and dry.      Capillary Refill: Capillary refill takes less than 2 seconds.      Coloration: Skin is not jaundiced.   Neurological:      Mental Status: He is alert and oriented to person, place, and time.      Cranial Nerves: No cranial nerve deficit.   Psychiatric:         Mood and Affect: Mood normal.          Significant Labs:  CBC:   Recent Labs   Lab 12/27/24  0356   WBC 10.22   RBC 3.50*   HGB 10.5*   HCT 32.9*      MCV 94   MCH 30.0   MCHC 31.9*     CMP:   Recent Labs   Lab 12/27/24  0356   *   CALCIUM 9.3   ALBUMIN 3.4*   PROT 7.1      K 4.1   CO2 20*      BUN 68*   CREATININE 11.9*   ALKPHOS 69   ALT 8*   AST 11   BILITOT 0.7     All labs within the past 24 hours have been reviewed.    Assessment/Plan:     Cardiac/Vascular  * Chest pain  - defer to primary    Renal/  ESRD on hemodialysis  49 y.o. Black or  Male ESRD-HD M-W-F presents to ED on 12/27/2024 with chest pain.  Nephrology consulted for inpatient ESRD-HD management    Outpatient HD Information:  -Dialysis modality: Hemodialysis  -Outpatient HD unit: [facility]  -Nephrologist: Manny SIMPSON  -HD TX days: Monday/Wednesday/Friday, duration of treatment: 3-4 hrs   -Dialysis access: RUE AV fistula   -Residual urine: Minium   -EDW: 69.9 kg    Assessment:   - Will provide dialysis today (12/27/2024) with UF - 2L as BP tolerates for metabolic clearance and volume management   - Labs reviewed and dialysate to be adjusted to current labs.   - Continue to monitor intake and output  - Please avoid gadolinium, fleets, phos-based laxatives, NSAIDs  - Dialysis thrice weekly unless more urgent indications arise. Will evaluate RRT requirements Daily.    Anemia of ESRD   Recent Labs   Lab 12/27/24  0356   WBC 10.22   HGB 10.5*   HCT 32.9*        Lab Results   Component Value Date    FESATURATED 66 (H) 11/27/2023    FERRITIN 1,531 (H) 11/27/2023       - Goal in ESRD is Hgb of 10-11. Hgb 10.5.  - EPO can be  administered and dosed per his OP unit upon discharge.  - if patient is on iron infusions please D/C when active infection, cautiously use EPO when hx of malignancy, high BP (SBP usually > 170mmhg).    Mineral Bone Disease in ESRD   Lab Results   Component Value Date     (H) 12/11/2024    CALCIUM 9.3 12/27/2024    ALBUMIN 3.4 (L) 12/27/2024    PHOS 5.3 (H) 11/02/2024       - F/U PO4, Mg, Calcium. And albumin levels daily.   - Renal diet with protein intake goal 1.5 g/kg/d with 1 L fluid restriction   - If patient has poor oral intake, recommend nephro nutritional shakes (ex Novasource)  - Continue or restart home phos binder, will add phos level        Thank you for your consult. I will follow-up with patient. Please contact us if you have any additional questions.    Avis Mancilla DNP, FNP-C  Nephrology  [facility]

## 2024-12-27 NOTE — PLAN OF CARE
Problem: Hemodialysis  Goal: Safe, Effective Therapy Delivery  Outcome: Progressing  Goal: Effective Tissue Perfusion  Outcome: Progressing  Goal: Absence of Infection Signs and Symptoms  Outcome: Progressing     Dialysis tx ended to the right arm AVF, hemostasis achieved.    Net fluid removed: 2L    Pt c/o chest pain(5/10, sharp) upon arrival to dialysis unit. 2L O2 NC applied, x2 doses of prn nitro given. Pt denied chest pain (0/10) after receiving nitro.    Report given to charge nurse Bev. Pt.  Transported from [facility] to room 706 by wheelchair.

## 2024-12-27 NOTE — SUBJECTIVE & OBJECTIVE
Past Medical History:   Diagnosis Date    CAD (coronary artery disease), native coronary artery 11/21/2019    Cataract     Diabetes mellitus     Diabetic retinopathy     Dialysis patient     DM type 2 causing renal disease, not at goal     ESRD (end stage renal disease) started dialysis 01/2014 06/05/2014    History of colon polyps 02/10/2021    History of COVID-19 12/29/2022    Hyperparathyroidism, secondary renal 06/05/2014    Hypertension     NSTEMI (non-ST elevated myocardial infarction) 12/21/2013    Organ transplant candidate 06/05/2014    Palliative care encounter 10/29/2024    Pleural effusion 06/07/2024    Pneumonia     Post PTCA 08/26/2020    RCA [facility] 7-25-20    Renal cell carcinoma of right kidney     Renal hypertension     Stroke        Past Surgical History:   Procedure Laterality Date    ANGIOGRAM, CORONARY, WITH LEFT HEART CATHETERIZATION N/A 7/1/2021    Procedure: Angiogram, Coronary, with Left Heart Cath;  Surgeon: Miki Zendejas MD;  Location: [facility] CATH LAB;  Service: Cardiology;  Laterality: N/A;    ANGIOGRAM, CORONARY, WITH LEFT HEART CATHETERIZATION N/A 11/28/2023    Procedure: Angiogram, Coronary, with Left Heart Cath;  Surgeon: Noah Pendleton MD;  Location: [facility] CATH LAB;  Service: Cardiology;  Laterality: N/A;    COLONOSCOPY N/A 5/3/2017    Procedure: COLONOSCOPY;  Surgeon: Rufino Carpenter MD;  Location: [facility] (4TH FLR);  Service: Endoscopy;  Laterality: N/A;  pt states can only schedule on Wednesdays    COLONOSCOPY N/A 2/9/2021    Procedure: COLONOSCOPY;  Surgeon: Edil Wong MD;  Location: [facility] (4TH FLR);  Service: Endoscopy;  Laterality: N/A;  Dialysis MWF/ labwork day of procedure  right arm aceess  per Dr. Colin pt can hold Plavix 5 days prior see note- sm  COVID test on 2/6/21 at [facility] - sm    COLONOSCOPY N/A 2/10/2021    Procedure: COLONOSCOPY;  Surgeon: Robert Ayers MD;  Location: [facility] (4TH FLR);  Service: Endoscopy;  Laterality: N/A;  rescheduled due to poor  bowel prep-BB  negative covid screening 2/6/21-BB  dialysis M-W-F-BB  okay to r/s for 2/9/21 and to hold Plavix per Dr. KIRAN Wong-BB  labs same day-BB    CORONARY STENT PLACEMENT N/A 7/25/2019    Procedure: INSERTION, STENT, CORONARY ARTERY;  Surgeon: Miki Zendejas MD;  Location: [facility] CATH LAB;  Service: Cardiology;  Laterality: N/A;    DIALYSIS FISTULA CREATION      FISTULOGRAM N/A 2/18/2019    Procedure: Fistulogram;  Surgeon: Yaneth De La Cruz MD;  Location: [facility] CATH LAB;  Service: Cardiology;  Laterality: N/A;    FISTULOGRAM Right 7/23/2019    Procedure: Fistulogram;  Surgeon: Oz Cordoba MD;  Location: [facility] CATH LAB;  Service: Cardiology;  Laterality: Right;    LAPAROSCOPIC ROBOT-ASSISTED SURGICAL REMOVAL OF KIDNEY USING DA JAIME XI Right 5/19/2022    Procedure: XI ROBOTIC NEPHRECTOMY;  Surgeon: Aidan Hendricks MD;  Location: [address];  Service: Urology;  Laterality: Right;  3hrs    LAPAROSCOPIC ROBOT-ASSISTED SURGICAL REMOVAL OF KIDNEY USING DA JAIME XI Left 1/5/2023    Procedure: XI ROBOTIC NEPHRECTOMY;  Surgeon: Aidan Hendricks MD;  Location: [address];  Service: Urology;  Laterality: Left;  4 hrs    LEFT HEART CATHETERIZATION Left 7/25/2019    Procedure: Left heart cath;  Surgeon: Miki Zendejas MD;  Location: [facility] CATH LAB;  Service: Cardiology;  Laterality: Left;    REPAIR  1/5/2023    Procedure: REPAIR; COLOTOMY;  Surgeon: Maikel Lamb MD;  Location: [address];  Service: General;;    RETINAL LASER PROCEDURE Bilateral 2018 or 2017    Dr. Rothman    VASCULAR SURGERY         Review of patient's allergies indicates:   Allergen Reactions    No known allergies        Current Facility-Administered Medications on File Prior to Encounter   Medication    lidocaine (PF) 10 mg/ml (1%) injection 10 mg     Current Outpatient Medications on File Prior to Encounter   Medication Sig    aspirin (ECOTRIN) 81 MG EC tablet Take 1 tablet (81 mg total) by mouth once daily.     atorvastatin (LIPITOR) 80 MG tablet Take 1 tablet (80 mg total) by mouth once daily.    carvediloL (COREG) 25 MG tablet Take 2 tablets (50 mg total) by mouth 2 (two) times daily with meals.    ciclopirox (PENLAC) 8 % Soln Apply topically nightly.    clopidogreL (PLAVIX) 75 mg tablet Take 1 tablet (75 mg total) by mouth once daily.    epoetin philip (PROCRIT) 4,000 unit/mL injection Inject 1.93 mLs (7,720 Units total) into the skin every Mon, Wed, Fri.    ezetimibe (ZETIA) 10 mg tablet Take 1 tablet (10 mg total) by mouth once daily.    hydrALAZINE (APRESOLINE) 100 MG tablet Take 1 tablet (100 mg total) by mouth every 8 (eight) hours.    isosorbide mononitrate (IMDUR) 120 MG 24 hr tablet Take 1 tablet (120 mg total) by mouth once daily.    methoxy peg-epoetin beta (MIRCERA INJ) 150 mcg.    minoxidiL (LONITEN) 10 MG Tab Take 1 tablet (10 mg total) by mouth 2 (two) times daily.    NIFEdipine (PROCARDIA-XL) 90 MG (OSM) 24 hr tablet Take 1 tablet (90 mg total) by mouth once daily.    nitroGLYCERIN (NITROSTAT) 0.4 MG SL tablet Place 1 tablet (0.4 mg total) under the tongue every 5 (five) minutes as needed for Chest pain.    polyethylene glycol (GLYCOLAX) 17 gram/dose powder Take 17 g by mouth daily as needed for Constipation.    ranolazine (RANEXA) 500 MG Tb12 Take 1 tablet (500 mg total) by mouth Daily.    SENSIPAR 60 mg Tab Take 60 mg by mouth daily with breakfast.    sevelamer carbonate (RENVELA) 800 mg Tab Take 2 tablets (1,600 mg total) by mouth 3 (three) times daily with meals.    vit B complex-C-folic ac-zinc (RENAPLEX) 800 mcg- 12.5 mg Tab Take 1 tablet by mouth.    XPHOZAH 20 mg Tab Take 20 mg by mouth 2 (two) times a day.     Family History       Problem Relation (Age of Onset)    Cancer Maternal Grandfather    Cataracts Mother    Diabetes Mother    Heart disease Brother    Hypertension Mother, Father, Sister, Brother    Kidney disease Brother          Tobacco Use    Smoking status: Never    Smokeless tobacco:  Never   Substance and Sexual Activity    Alcohol use: Not Currently     Comment: One drink a month    Drug use: No    Sexual activity: Yes     Partners: Female     Birth control/protection: None     Review of Systems   Cardiovascular:  Positive for chest pain. Negative for dyspnea on exertion, near-syncope, orthopnea and syncope.   Respiratory:  Negative for shortness of breath.      Objective:     Vital Signs (Most Recent):  Temp: 97.6 °F (36.4 °C) (12/27/24 0733)  Pulse: 72 (12/27/24 0757)  Resp: 19 (12/27/24 0733)  BP: (!) 182/90 (12/27/24 0757)  SpO2: 96 % (12/27/24 0757) Vital Signs (24h Range):  Temp:  [97.6 °F (36.4 °C)-98.2 °F (36.8 °C)] 97.6 °F (36.4 °C)  Pulse:  [67-79] 72  Resp:  [14-19] 19  SpO2:  [95 %-100 %] 96 %  BP: (162-211)/() 182/90     Weight: 77.1 kg (170 lb)  Body mass index is 26.63 kg/m².    SpO2: 96 %       No intake or output data in the 24 hours ending 12/27/24 0905    Lines/Drains/Airways       Peripheral Intravenous Line  Duration                  Hemodialysis AV Fistula Right forearm -- days         Peripheral IV - Single Lumen 12/27/24 0235 18 G Anterior;Left Forearm <1 day                     Physical Exam  Constitutional:       General: He is not in acute distress.     Appearance: Normal appearance.   HENT:      Head: Normocephalic and atraumatic.      Right Ear: External ear normal.      Left Ear: External ear normal.      Mouth/Throat:      Mouth: Mucous membranes are moist.      Pharynx: Oropharynx is clear.   Cardiovascular:      Rate and Rhythm: Normal rate and regular rhythm.      Pulses: Normal pulses.      Heart sounds: Murmur heard.      Comments: Holosystolic murmur beast heard at [facility]    Pulmonary:      Effort: Pulmonary effort is normal.      Breath sounds: Normal breath sounds. No rales.   Neurological:      Mental Status: He is alert.          Significant Labs: CMP   Recent Labs   Lab 12/27/24  0356      K 4.1      CO2 20*   *   BUN 68*    CREATININE 11.9*   CALCIUM 9.3   PROT 7.1   ALBUMIN 3.4*   BILITOT 0.7   ALKPHOS 69   AST 11   ALT 8*   ANIONGAP 16    and CBC   Recent Labs   Lab 12/27/24  0356   WBC 10.22   HGB 10.5*   HCT 32.9*          Significant Imaging:  Pertinent imaging reviewed

## 2024-12-27 NOTE — NURSING
Dialysis tx started to the right arm AVF per orders placed by GISSELL Mancilla:    Order Questions    Question Answer   Antibiotics on HD? No   Duration of Treatment 3.5 hours   Dialyzer [id number]   Dialysate Temperature (C) 36.5   Target  mL/min   If unable to maintain flow due to inadequate vascular access patency, patient intolerance (i.e. chest pain, access discomfort) or elevated venous pressure, adjust blood flow rate to a minimum of _____mL/min 100    mL/min   K+ Potassium per Protocol   Ca++ Calcium per Protocol   Na+ Sodium per Protocol   Bicarb Bicarbonate per Protocol   Access to be used Other (please specify)   Target UF 2L   If unable to maintain this UFR due to patient intolerance (i.e. hypotension, chest pain, muscle cramping, nausea or vomiting), adjust UFR to achieve a minimum of _______ liters of UF 0   Fluid Removal Instructions maintain SBP > 90 mmHG

## 2024-12-27 NOTE — ASSESSMENT & PLAN NOTE
Creatine stable for now. BMP reviewed- noted Estimated Creatinine Clearance: 7.2 mL/min (A) (based on SCr of 11.9 mg/dL (H)). according to latest data. Based on current GFR, CKD stage is end stage.  Monitor UOP and serial BMP and adjust therapy as needed. Renally dose meds. Avoid nephrotoxic medications and procedures.  - Nephrology consulted for HD, appreciate recs

## 2024-12-27 NOTE — ED NOTES
Telemetry Verification   Patient placed on Telemetry Box  Verified with War Room  Box # [id number]   Monitor Tech Onje   Rate 69   Rhythm NSR

## 2024-12-27 NOTE — ASSESSMENT & PLAN NOTE
Patient's blood pressure range in the last 24 hours was: BP  Min: 162/91  Max: 211/102.The patient's inpatient anti-hypertensive regimen is listed below:  Current Antihypertensives  nitroGLYCERIN SL tablet 0.4 mg, Every 5 min PRN, Sublingual  carvediloL tablet 50 mg, 2 times daily with meals, Oral  hydrALAZINE tablet 100 mg, Every 8 hours, Oral  isosorbide mononitrate 24 hr tablet 120 mg, Daily, Oral  minoxidiL tablet 10 mg, 2 times daily, Oral  NIFEdipine 24 hr tablet 90 mg, Daily, Oral  nitroGLYCERIN SL tablet 0.4 mg, Every 5 min PRN, Sublingual  ranolazine 12 hr tablet 500 mg, Daily, Oral    Plan  - BP is uncontrolled, will adjust as follows:    - Can increase Ranolazine to 1000mg qd  - Not taking minoxidil at home d/t low BP at HD sessions - discuss with nephrology resuming Minoxidil on non-HD days for better BP control   - Resume home regimen otherwise

## 2024-12-27 NOTE — ASSESSMENT & PLAN NOTE
Anemia is likely due to chronic disease due to ESRD. Most recent hemoglobin and hematocrit are listed below.  Recent Labs     12/27/24  0356   HGB 10.5*   HCT 32.9*     Plan  - Monitor serial CBC: Daily  - Transfuse PRBC if patient becomes hemodynamically unstable, symptomatic or H/H drops below 7/21.  - Patient has not received any PRBC transfusions to date  - Patient's anemia is currently stable

## 2024-12-27 NOTE — CONSULTS
[facility]  Cardiology  Consult Note    Patient Name: Haroldo Dickinson  MRN: [id number]  Admission Date: 12/27/2024  Hospital Length of Stay: 0 days  Code Status: Full Code   Attending Provider: Omar Marino MD   Consulting Provider: Keely Solitario MD  Primary Care Physician: Mireya Larose MD  Principal Problem:Chest pain    Patient information was obtained from patient, past medical records, and ER records.     Inpatient consult to Cardiology  Consult performed by: Keely Solitario MD  Consult ordered by: Ijeoma Taveras PA-C        Subjective:     Chief Complaint:  chest pain     HPI:   Patient is a 49M with ESRD on HD, CAD (HILLARY to mid RCA but now , mid/prox-LAD disease), HFmrEF (40-45%), CVA with RSW, RCC s/p BL nephrectomy, HTN here for chest pain. States he was watching TV when he suddenly experienced onset of dull, pressure-like chest pain on L side of chest which slightly radiated to center chest. Did not radiate to arm or jaw. Denied associated shortness of breath with onset of chest pain (though did report this to ED provider). States chest pain constant and somewhat improves with exertion. Did not diminish with SL NG. States it felt similar to previous chest pain that required HILLARY, however was not able to recall details of that chest pain episode. Does not worsen with deep breaths. No associated cough, fever, or chills. Slight nausea overnight, however overall CP not associated with N/V. States he has not missed any doses of antihypertensive regimen or missed HD sessions. Reports BP approx 140 systolic when he checks at home.     On arrival to [facility], hypertension maximally to 211/102. No supplemental O2 needed. Chest pain present on assessment. BNP 1322, CXR with mild interstitial edema, but no pleural effusions.  -> 364 -> 561. EKG with NSR, flattened T-waves when compared to EKG 10/30/2024, no ST changes.     Cardiology consulted for chest pain with rising troponin.     Cardiac  History:   NSTEMI - PCI 2020 with HILLARY to RCA  [facility] 11/2023 with 3vd not amenable to stenting, recommended medical management    The Mid LAD lesion was 90% stenosed.    The Mid RCA lesion was 100% stenosed.    The Prox LAD lesion was 75% stenosed.  PET stress 10/31/24: 2 fixed perfusion abnormaltieis: distal inferior and apical fixed perfusion abnormality (c/w LAD disease),  moderate intensity basal inferior and inferolateral fixed perfusion abnormality (PLB/LCX territory).   TTE 10/28/24: LVEF 40 - 45%, diastolic dysfunction, LAE, PASP 61     Cardiac Meds:  ASA/Plavix, Coreg 50mg bid, Nifedipine 90 qd, Ranolazine 500, Isosorbide mononitrate 120, Hydralazine 100 tid, Minoxidil 10 (filled 10/29), Lipitor 80, Ezetimibe    Past Medical History:   Diagnosis Date    CAD (coronary artery disease), native coronary artery 11/21/2019    Cataract     Diabetes mellitus     Diabetic retinopathy     Dialysis patient     DM type 2 causing renal disease, not at goal     ESRD (end stage renal disease) started dialysis 01/2014 06/05/2014    History of colon polyps 02/10/2021    History of COVID-19 12/29/2022    Hyperparathyroidism, secondary renal 06/05/2014    Hypertension     NSTEMI (non-ST elevated myocardial infarction) 12/21/2013    Organ transplant candidate 06/05/2014    Palliative care encounter 10/29/2024    Pleural effusion 06/07/2024    Pneumonia     Post PTCA 08/26/2020    RCA HILLARY 7-25-20    Renal cell carcinoma of right kidney     Renal hypertension     Stroke        Past Surgical History:   Procedure Laterality Date    ANGIOGRAM, CORONARY, WITH LEFT HEART CATHETERIZATION N/A 7/1/2021    Procedure: Angiogram, Coronary, with Left Heart Cath;  Surgeon: Miki Zendejas MD;  Location: [facility] CATH LAB;  Service: Cardiology;  Laterality: N/A;    ANGIOGRAM, CORONARY, WITH LEFT HEART CATHETERIZATION N/A 11/28/2023    Procedure: Angiogram, Coronary, with Left Heart Cath;  Surgeon: Noah Pendleton MD;  Location: [facility] CATH  LAB;  Service: Cardiology;  Laterality: N/A;    COLONOSCOPY N/A 5/3/2017    Procedure: COLONOSCOPY;  Surgeon: Rufino Carpenter MD;  Location: [facility] (4TH FLR);  Service: Endoscopy;  Laterality: N/A;  pt states can only schedule on Wednesdays    COLONOSCOPY N/A 2/9/2021    Procedure: COLONOSCOPY;  Surgeon: Edil Wong MD;  Location: [facility] (4TH FLR);  Service: Endoscopy;  Laterality: N/A;  Dialysis MWF/ labwork day of procedure  right arm aceess  per Dr. Colin pt can hold Plavix 5 days prior see note- sm  COVID test on 2/6/21 at [facility] - sm    COLONOSCOPY N/A 2/10/2021    Procedure: COLONOSCOPY;  Surgeon: Robert Ayers MD;  Location: [facility] ENDO (4TH FLR);  Service: Endoscopy;  Laterality: N/A;  rescheduled due to poor bowel prep-BB  negative covid screening 2/6/21-BB  dialysis M-W-F-BB  okay to r/s for 2/9/21 and to hold Plavix per Dr. KIRAN Wnog-BB  labs same day-BB    CORONARY STENT PLACEMENT N/A 7/25/2019    Procedure: INSERTION, STENT, CORONARY ARTERY;  Surgeon: Miki Zendejas MD;  Location: [facility] CATH LAB;  Service: Cardiology;  Laterality: N/A;    DIALYSIS FISTULA CREATION      FISTULOGRAM N/A 2/18/2019    Procedure: Fistulogram;  Surgeon: Yaneth De La Cruz MD;  Location: [facility] CATH LAB;  Service: Cardiology;  Laterality: N/A;    FISTULOGRAM Right 7/23/2019    Procedure: Fistulogram;  Surgeon: Oz Cordoba MD;  Location: [facility] CATH LAB;  Service: Cardiology;  Laterality: Right;    LAPAROSCOPIC ROBOT-ASSISTED SURGICAL REMOVAL OF KIDNEY USING DA JAIME XI Right 5/19/2022    Procedure: XI ROBOTIC NEPHRECTOMY;  Surgeon: Aidan Hendricks MD;  Location: [address];  Service: Urology;  Laterality: Right;  3hrs    LAPAROSCOPIC ROBOT-ASSISTED SURGICAL REMOVAL OF KIDNEY USING DA JAIME XI Left 1/5/2023    Procedure: XI ROBOTIC NEPHRECTOMY;  Surgeon: Aidan Hendricks MD;  Location: [address];  Service: Urology;  Laterality: Left;  4 hrs    LEFT HEART CATHETERIZATION Left 7/25/2019    Procedure: Left  heart cath;  Surgeon: Miki Zendejas MD;  Location: [facility] CATH LAB;  Service: Cardiology;  Laterality: Left;    REPAIR  1/5/2023    Procedure: REPAIR; COLOTOMY;  Surgeon: Maikel Lamb MD;  Location: [facility] OR [address] [place] [address];  Service: General;;    RETINAL LASER PROCEDURE Bilateral 2018 or 2017    Dr. Rothman    VASCULAR SURGERY         Review of patient's allergies indicates:   Allergen Reactions    No known allergies        Current Facility-Administered Medications on File Prior to Encounter   Medication    lidocaine (PF) 10 mg/ml (1%) injection 10 mg     Current Outpatient Medications on File Prior to Encounter   Medication Sig    aspirin (ECOTRIN) 81 MG EC tablet Take 1 tablet (81 mg total) by mouth once daily.    atorvastatin (LIPITOR) 80 MG tablet Take 1 tablet (80 mg total) by mouth once daily.    carvediloL (COREG) 25 MG tablet Take 2 tablets (50 mg total) by mouth 2 (two) times daily with meals.    ciclopirox (PENLAC) 8 % Soln Apply topically nightly.    clopidogreL (PLAVIX) 75 mg tablet Take 1 tablet (75 mg total) by mouth once daily.    epoetin philip (PROCRIT) 4,000 unit/mL injection Inject 1.93 mLs (7,720 Units total) into the skin every Mon, Wed, Fri.    ezetimibe (ZETIA) 10 mg tablet Take 1 tablet (10 mg total) by mouth once daily.    hydrALAZINE (APRESOLINE) 100 MG tablet Take 1 tablet (100 mg total) by mouth every 8 (eight) hours.    isosorbide mononitrate (IMDUR) 120 MG 24 hr tablet Take 1 tablet (120 mg total) by mouth once daily.    methoxy peg-epoetin beta (MIRCERA INJ) 150 mcg.    minoxidiL (LONITEN) 10 MG Tab Take 1 tablet (10 mg total) by mouth 2 (two) times daily.    NIFEdipine (PROCARDIA-XL) 90 MG (OSM) 24 hr tablet Take 1 tablet (90 mg total) by mouth once daily.    nitroGLYCERIN (NITROSTAT) 0.4 MG SL tablet Place 1 tablet (0.4 mg total) under the tongue every 5 (five) minutes as needed for Chest pain.    polyethylene glycol (GLYCOLAX) 17 gram/dose powder Take 17 g by mouth daily as needed for  Constipation.    ranolazine (RANEXA) 500 MG Tb12 Take 1 tablet (500 mg total) by mouth Daily.    SENSIPAR 60 mg Tab Take 60 mg by mouth daily with breakfast.    sevelamer carbonate (RENVELA) 800 mg Tab Take 2 tablets (1,600 mg total) by mouth 3 (three) times daily with meals.    vit B complex-C-folic ac-zinc (RENAPLEX) 800 mcg- 12.5 mg Tab Take 1 tablet by mouth.    XPHOZAH 20 mg Tab Take 20 mg by mouth 2 (two) times a day.     Family History       Problem Relation (Age of Onset)    Cancer Maternal Grandfather    Cataracts Mother    Diabetes Mother    Heart disease Brother    Hypertension Mother, Father, Sister, Brother    Kidney disease Brother          Tobacco Use    Smoking status: Never    Smokeless tobacco: Never   Substance and Sexual Activity    Alcohol use: Not Currently     Comment: One drink a month    Drug use: No    Sexual activity: Yes     Partners: Female     Birth control/protection: None     Review of Systems   Cardiovascular:  Positive for chest pain. Negative for dyspnea on exertion, near-syncope, orthopnea and syncope.   Respiratory:  Negative for shortness of breath.      Objective:     Vital Signs (Most Recent):  Temp: 97.6 °F (36.4 °C) (12/27/24 0733)  Pulse: 72 (12/27/24 0757)  Resp: 19 (12/27/24 0733)  BP: (!) 182/90 (12/27/24 0757)  SpO2: 96 % (12/27/24 0757) Vital Signs (24h Range):  Temp:  [97.6 °F (36.4 °C)-98.2 °F (36.8 °C)] 97.6 °F (36.4 °C)  Pulse:  [67-79] 72  Resp:  [14-19] 19  SpO2:  [95 %-100 %] 96 %  BP: (162-211)/() 182/90     Weight: 77.1 kg (170 lb)  Body mass index is 26.63 kg/m².    SpO2: 96 %       No intake or output data in the 24 hours ending 12/27/24 0905    Lines/Drains/Airways       Peripheral Intravenous Line  Duration                  Hemodialysis AV Fistula Right forearm -- days         Peripheral IV - Single Lumen 12/27/24 0235 18 G Anterior;Left Forearm <1 day                     Physical Exam  Constitutional:       General: He is not in acute distress.      Appearance: Normal appearance.   HENT:      Head: Normocephalic and atraumatic.      Right Ear: External ear normal.      Left Ear: External ear normal.      Mouth/Throat:      Mouth: Mucous membranes are moist.      Pharynx: Oropharynx is clear.   Cardiovascular:      Rate and Rhythm: Normal rate and regular rhythm.      Pulses: Normal pulses.      Heart sounds: Murmur heard.      Comments: Holosystolic murmur beast heard at LLSB    Pulmonary:      Effort: Pulmonary effort is normal.      Breath sounds: Normal breath sounds. No rales.   Neurological:      Mental Status: He is alert.          Significant Labs: CMP   Recent Labs   Lab 12/27/24  0356      K 4.1      CO2 20*   *   BUN 68*   CREATININE 11.9*   CALCIUM 9.3   PROT 7.1   ALBUMIN 3.4*   BILITOT 0.7   ALKPHOS 69   AST 11   ALT 8*   ANIONGAP 16    and CBC   Recent Labs   Lab 12/27/24  0356   WBC 10.22   HGB 10.5*   HCT 32.9*          Significant Imaging:  Pertinent imaging reviewed  Assessment and Plan:     * Chest pain  Slightly differing history given to providers, however not overall c/w cardiac chest pain given constant nature, reported improvement with exertion, and no associated dyspnea on exertion/shortness of breath. Rising HST likely related to HTN urgency and ESRD and baseline troponin elevation. EKG with TW flattening but otherwise no ST changes or other evidence of ischemia. Known severe 3vd with RCA .     Needs BP optimization. Not a candidate for LHC given fixed perfusion defects.     - HD for volume removal  - Resume home antihypertensive regimen  - Discuss with Nephro resuming Minoxidil versus Clonidine (prev stopped Minoxidil d/t low BP noted during HD sessions)  - DAPT    Triple vessel coronary artery disease  Patient with known CAD s/p stent placement, which is controlled Will continue ASA, Plavix, and Statin and monitor for S/Sx of angina/ACS. Continue to monitor on telemetry.     - Cotninue DAPT  - No LHC -  discussed with patient that he is not a candidate due to multiple fixed perfusion defects not amenable to percutaneous reperfusion therapy     Essential hypertension  Patient's blood pressure range in the last 24 hours was: BP  Min: 162/91  Max: 211/102.The patient's inpatient anti-hypertensive regimen is listed below:  Current Antihypertensives  nitroGLYCERIN SL tablet 0.4 mg, Every 5 min PRN, Sublingual  carvediloL tablet 50 mg, 2 times daily with meals, Oral  hydrALAZINE tablet 100 mg, Every 8 hours, Oral  isosorbide mononitrate 24 hr tablet 120 mg, Daily, Oral  minoxidiL tablet 10 mg, 2 times daily, Oral  NIFEdipine 24 hr tablet 90 mg, Daily, Oral  nitroGLYCERIN SL tablet 0.4 mg, Every 5 min PRN, Sublingual  ranolazine 12 hr tablet 500 mg, Daily, Oral    Plan  - BP is uncontrolled, will adjust as follows:    - Can increase Ranolazine to 1000mg qd  - Not taking minoxidil at home d/t low BP at HD sessions - discuss with nephrology resuming Minoxidil on non-HD days for better BP control   - Resume home regimen otherwise        VTE Risk Mitigation (From admission, onward)           Ordered     IP VTE HIGH RISK PATIENT  Once         12/27/24 0745     Place sequential compression device  Until discontinued         12/27/24 0745                    Thank you for your consult. I will sign off. Please contact us if you have any additional questions.    Keely Solitario MD  Cardiology   [facility]

## 2024-12-27 NOTE — ASSESSMENT & PLAN NOTE
"Patient's FSGs are controlled on current medication regimen.  Last A1c reviewed-   Lab Results   Component Value Date    HGBA1C 4.3 (L) 10/11/2024     Most recent fingerstick glucose reviewed- No results for input(s): "POCTGLUCOSE" in the last 24 hours.  Current correctional scale  Low  Maintain anti-hyperglycemic dose as follows-   Antihyperglycemics (From admission, onward)      Start     Stop Route Frequency Ordered    12/27/24 0844  insulin aspart U-100 pen 0-5 Units         -- SubQ Before meals & nightly PRN 12/27/24 0745          Hold Oral hypoglycemics while patient is in the hospital.  "

## 2024-12-27 NOTE — SUBJECTIVE & OBJECTIVE
Past Medical History:   Diagnosis Date    CAD (coronary artery disease), native coronary artery 11/21/2019    Cataract     Diabetes mellitus     Diabetic retinopathy     Dialysis patient     DM type 2 causing renal disease, not at goal     ESRD (end stage renal disease) started dialysis 01/2014 06/05/2014    History of colon polyps 02/10/2021    History of COVID-19 12/29/2022    Hyperparathyroidism, secondary renal 06/05/2014    Hypertension     NSTEMI (non-ST elevated myocardial infarction) 12/21/2013    Organ transplant candidate 06/05/2014    Palliative care encounter 10/29/2024    Pleural effusion 06/07/2024    Pneumonia     Post PTCA 08/26/2020    RCA HILLARY 7-25-20    Renal cell carcinoma of right kidney     Renal hypertension     Stroke        Past Surgical History:   Procedure Laterality Date    ANGIOGRAM, CORONARY, WITH LEFT HEART CATHETERIZATION N/A 7/1/2021    Procedure: Angiogram, Coronary, with Left Heart Cath;  Surgeon: Miki Zendejas MD;  Location: [facility] CATH LAB;  Service: Cardiology;  Laterality: N/A;    ANGIOGRAM, CORONARY, WITH LEFT HEART CATHETERIZATION N/A 11/28/2023    Procedure: Angiogram, Coronary, with Left Heart Cath;  Surgeon: Noah Pendleton MD;  Location: [facility] CATH LAB;  Service: Cardiology;  Laterality: N/A;    COLONOSCOPY N/A 5/3/2017    Procedure: COLONOSCOPY;  Surgeon: Rufino Carpenter MD;  Location: [facility] (4TH FLR);  Service: Endoscopy;  Laterality: N/A;  pt states can only schedule on Wednesdays    COLONOSCOPY N/A 2/9/2021    Procedure: COLONOSCOPY;  Surgeon: Edil Wong MD;  Location: [facility] (4TH FLR);  Service: Endoscopy;  Laterality: N/A;  Dialysis MWF/ labwork day of procedure  right arm aceess  per Dr. Colin pt can hold Plavix 5 days prior see note- sm  COVID test on 2/6/21 at [facility] - sm    COLONOSCOPY N/A 2/10/2021    Procedure: COLONOSCOPY;  Surgeon: Robert Ayers MD;  Location: [facility] (4TH FLR);  Service: Endoscopy;  Laterality: N/A;  rescheduled due to poor  bowel prep-BB  negative covid screening 2/6/21-BB  dialysis M-W-F-BB  okay to r/s for 2/9/21 and to hold Plavix per Dr. KIRAN Wong-BB  labs same day-BB    CORONARY STENT PLACEMENT N/A 7/25/2019    Procedure: INSERTION, STENT, CORONARY ARTERY;  Surgeon: Miki Zendejas MD;  Location: [facility] CATH LAB;  Service: Cardiology;  Laterality: N/A;    DIALYSIS FISTULA CREATION      FISTULOGRAM N/A 2/18/2019    Procedure: Fistulogram;  Surgeon: Yaneth De La Cruz MD;  Location: [facility] CATH LAB;  Service: Cardiology;  Laterality: N/A;    FISTULOGRAM Right 7/23/2019    Procedure: Fistulogram;  Surgeon: Oz Cordoba MD;  Location: [facility] CATH LAB;  Service: Cardiology;  Laterality: Right;    LAPAROSCOPIC ROBOT-ASSISTED SURGICAL REMOVAL OF KIDNEY USING DA JAIME XI Right 5/19/2022    Procedure: XI ROBOTIC NEPHRECTOMY;  Surgeon: Aidan Hendricks MD;  [address]  Service: Urology;  Laterality: Right;  3hrs    LAPAROSCOPIC ROBOT-ASSISTED SURGICAL REMOVAL OF KIDNEY USING DA JAIME XI Left 1/5/2023    Procedure: XI ROBOTIC NEPHRECTOMY;  Surgeon: Aidan Hendricks MD;  [facility];  Service: Urology;  Laterality: Left;  4 hrs    LEFT HEART CATHETERIZATION Left 7/25/2019    Procedure: Left heart cath;  Surgeon: Miki Zendejas MD;  Location: [facility] CATH LAB;  Service: Cardiology;  Laterality: Left;    REPAIR  1/5/2023    Procedure: REPAIR; COLOTOMY;  Surgeon: Maikel Lamb MD;  Location: [address];  Service: General;;    RETINAL LASER PROCEDURE Bilateral 2018 or 2017    Dr. Rothman    VASCULAR SURGERY         Review of patient's allergies indicates:   Allergen Reactions    No known allergies      Current Facility-Administered Medications   Medication Frequency    0.9% NaCl infusion Once    acetaminophen tablet 650 mg Q4H PRN    albuterol-ipratropium 2.5 mg-0.5 mg/3 mL nebulizer solution 3 mL Q4H PRN    aluminum-magnesium hydroxide-simethicone 200-200-20 mg/5 mL suspension 30 mL QID PRN     aspirin EC tablet 81 mg Daily    atorvastatin tablet 80 mg Daily    bisacodyL suppository 10 mg Daily PRN    carvediloL tablet 50 mg BID WM    cinacalcet tablet 60 mg Daily with breakfast    clopidogreL tablet 75 mg Daily    dextrose 50% injection 12.5 g PRN    dextrose 50% injection 25 g PRN    ezetimibe tablet 10 mg Daily    glucagon (human recombinant) injection 1 mg PRN    glucose chewable tablet 16 g PRN    glucose chewable tablet 24 g PRN    hydrALAZINE tablet 100 mg Q8H    insulin aspart U-100 pen 0-5 Units QID (AC + HS) PRN    [START ON 12/28/2024] isosorbide mononitrate 24 hr tablet 120 mg Daily    melatonin tablet 6 mg Nightly PRN    minoxidiL tablet 10 mg BID    naloxone 0.4 mg/mL injection 0.02 mg PRN    [START ON 12/28/2024] NIFEdipine 24 hr tablet 90 mg Daily    nitroGLYCERIN SL tablet 0.4 mg Q5 Min PRN    nitroGLYCERIN SL tablet 0.4 mg Q5 Min PRN    ondansetron injection 4 mg Q8H PRN    oxyCODONE immediate release tablet 5 mg Q6H PRN    oxyCODONE immediate release tablet Tab 10 mg Q6H PRN    polyethylene glycol packet 17 g Daily PRN    prochlorperazine injection Soln 5 mg Q6H PRN    ranolazine 12 hr tablet 500 mg Daily    sevelamer carbonate tablet 1,600 mg TID WM    simethicone chewable tablet 80 mg QID PRN    sodium chloride 0.9% flush 10 mL Q8H PRN     Facility-Administered Medications Ordered in Other Encounters   Medication Frequency    lidocaine (PF) 10 mg/ml (1%) injection 10 mg Once     Family History       Problem Relation (Age of Onset)    Cancer Maternal Grandfather    Cataracts Mother    Diabetes Mother    Heart disease Brother    Hypertension Mother, Father, Sister, Brother    Kidney disease Brother          Tobacco Use    Smoking status: Never    Smokeless tobacco: Never   Substance and Sexual Activity    Alcohol use: Not Currently     Comment: One drink a month    Drug use: No    Sexual activity: Yes     Partners: Female     Birth control/protection: None     Review of Systems    Constitutional:  Negative for activity change, chills and fever.   HENT:  Negative for trouble swallowing.    Eyes:  Negative for visual disturbance.   Respiratory:  Positive for shortness of breath. Negative for chest tightness and wheezing.    Cardiovascular:  Positive for chest pain. Negative for palpitations and leg swelling.   Gastrointestinal:  Negative for abdominal pain, constipation, diarrhea, nausea and vomiting.   Genitourinary:  Negative for dysuria, frequency, hematuria and urgency.   Musculoskeletal:  Negative for arthralgias and gait problem.   Skin:  Negative for wound.   Neurological:  Negative for dizziness, syncope, weakness, light-headedness, numbness and headaches.   Psychiatric/Behavioral:  Negative for agitation.      Objective:     Vital Signs (Most Recent):  Temp: 98 °F (36.7 °C) (12/27/24 0952)  Pulse: 75 (12/27/24 1056)  Resp: 20 (12/27/24 1017)  BP: (!) 182/85 (12/27/24 0952)  SpO2: 97 % (12/27/24 0952) Vital Signs (24h Range):  Temp:  [97.6 °F (36.4 °C)-98.2 °F (36.8 °C)] 98 °F (36.7 °C)  Pulse:  [67-79] 75  Resp:  [14-20] 20  SpO2:  [95 %-100 %] 97 %  BP: (162-211)/() 182/85     Weight: 77.1 kg (170 lb) (12/27/24 0234)  Body mass index is 26.63 kg/m².  Body surface area is 1.91 meters squared.    No intake/output data recorded.     Physical Exam  Vitals and nursing note reviewed.   Constitutional:       General: He is not in acute distress.     Appearance: Normal appearance. He is not ill-appearing.   HENT:      Head: Normocephalic and atraumatic.      Right Ear: External ear normal.      Left Ear: External ear normal.   Eyes:      Extraocular Movements: Extraocular movements intact.      Conjunctiva/sclera: Conjunctivae normal.   Cardiovascular:      Rate and Rhythm: Normal rate and regular rhythm.      Pulses: Normal pulses.      Heart sounds: Normal heart sounds.   Pulmonary:      Effort: Pulmonary effort is normal. No respiratory distress.      Breath sounds: Rales present.    Abdominal:      General: Abdomen is flat. Bowel sounds are normal.      Tenderness: There is no abdominal tenderness.   Musculoskeletal:         General: Normal range of motion.      Cervical back: Normal range of motion and neck supple.      Right lower leg: No edema.      Left lower leg: No edema.   Skin:     General: Skin is warm and dry.      Capillary Refill: Capillary refill takes less than 2 seconds.      Coloration: Skin is not jaundiced.   Neurological:      Mental Status: He is alert and oriented to person, place, and time.      Cranial Nerves: No cranial nerve deficit.   Psychiatric:         Mood and Affect: Mood normal.          Significant Labs:  CBC:   Recent Labs   Lab 12/27/24  0356   WBC 10.22   RBC 3.50*   HGB 10.5*   HCT 32.9*      MCV 94   MCH 30.0   MCHC 31.9*     CMP:   Recent Labs   Lab 12/27/24  0356   *   CALCIUM 9.3   ALBUMIN 3.4*   PROT 7.1      K 4.1   CO2 20*      BUN 68*   CREATININE 11.9*   ALKPHOS 69   ALT 8*   AST 11   BILITOT 0.7     All labs within the past 24 hours have been reviewed.

## 2024-12-27 NOTE — H&P
Kofi Evans - Emergency Dept  [facility] Medicine  History & Physical    Patient Name: Haroldo Dickinson  MRN: [id number]  Patient Class: OP- Observation  Admission Date: 12/27/2024  Attending Physician: Omar Marino MD   Primary Care Provider: Mireya Larose MD         Patient information was obtained from patient, past medical records, and ER records.     Subjective:     Principal Problem:Chest pain    Chief Complaint:   Chief Complaint   Patient presents with    Chest Pain     Patient reports CP that began 2 hours ago. States that it is in the R chest. Reports that he has had pains similar in the past.        HPI: Haroldo Dickinson is a 49 y.o. male with PMHx significant for mutli vessel CAD, HTN, ESRD on HD MWF, HLD admitted to  for ACS rule out. Patient reports left sided chest pressure that radiates to his substernal area with associated SOB. Reports that these symptoms are typical of his usually chest pain, but states today was more intensified. EMS was called, and patient given ASA and nitro en route with improvement of symptoms. Endorses compliance with medications and HD. Denies fever/chills, diaphoresis, lightheadedness, HA, cough, congestion, abdominal pain, n/v/d, urinary symptoms, changes in BMs, numbness/tingling, weakness. Of note, patient had abnormal PET stress test in October of this year and was ultimately treated with 48 hours of ACS.     In the ED, patient hypertensive to max 208/101. Remaining vitals stable. BNP 1322. HS troponin 240>364. K 4.1. Hgb 10.5. EKG with NSR and nonspecific ST changes. Given home antihypertensive meds (coreg, hydralazine, imdur, nifedipine) with mild improvement of BP. Given morphine and nitro x1 in the ED with temporary improvement of pain. Nephrology consulted for HD.    Past Medical History:   Diagnosis Date    CAD (coronary artery disease), native coronary artery 11/21/2019    Cataract     Diabetes mellitus     Diabetic retinopathy     Dialysis patient     DM type 2  causing renal disease, not at goal     ESRD (end stage renal disease) started dialysis 01/2014 06/05/2014    History of colon polyps 02/10/2021    History of COVID-19 12/29/2022    Hyperparathyroidism, secondary renal 06/05/2014    Hypertension     NSTEMI (non-ST elevated myocardial infarction) 12/21/2013    Organ transplant candidate 06/05/2014    Palliative care encounter 10/29/2024    Pleural effusion 06/07/2024    Pneumonia     Post PTCA 08/26/2020    RCA HILLARY 7-25-20    Renal cell carcinoma of right kidney     Renal hypertension     Stroke        Past Surgical History:   Procedure Laterality Date    ANGIOGRAM, CORONARY, WITH LEFT HEART CATHETERIZATION N/A 7/1/2021    Procedure: Angiogram, Coronary, with Left Heart Cath;  Surgeon: Miki Zendejas MD;  Location: [facility] CATH LAB;  Service: Cardiology;  Laterality: N/A;    ANGIOGRAM, CORONARY, WITH LEFT HEART CATHETERIZATION N/A 11/28/2023    Procedure: Angiogram, Coronary, with Left Heart Cath;  Surgeon: Noah Pendleton MD;  Location: [facility] CATH LAB;  Service: Cardiology;  Laterality: N/A;    COLONOSCOPY N/A 5/3/2017    Procedure: COLONOSCOPY;  Surgeon: Rufino Carpenter MD;  Location: [facility] ENDO (4TH FLR);  Service: Endoscopy;  Laterality: N/A;  pt states can only schedule on Wednesdays    COLONOSCOPY N/A 2/9/2021    Procedure: COLONOSCOPY;  Surgeon: Edil Wong MD;  Location: [facility] ENDO (4TH FLR);  Service: Endoscopy;  Laterality: N/A;  Dialysis MWF/ labwork day of procedure  right arm aceess  per Dr. Colin pt can hold Plavix 5 days prior see note- sm  COVID test on 2/6/21 at [facility] - sm    COLONOSCOPY N/A 2/10/2021    Procedure: COLONOSCOPY;  Surgeon: Robert Ayers MD;  Location: [facility] ENDO (4TH FLR);  Service: Endoscopy;  Laterality: N/A;  rescheduled due to poor bowel prep-BB  negative covid screening 2/6/21-BB  dialysis M-W-F-BB  okay to r/s for 2/9/21 and to hold Plavix per Dr. KIRAN Wong-BB  labs same day-BB    CORONARY STENT PLACEMENT N/A 7/25/2019     Procedure: INSERTION, STENT, CORONARY ARTERY;  Surgeon: Miki Zendejas MD;  Location: [facility] CATH LAB;  Service: Cardiology;  Laterality: N/A;    DIALYSIS FISTULA CREATION      FISTULOGRAM N/A 2/18/2019    Procedure: Fistulogram;  Surgeon: Yaneth De La Cruz MD;  Location: [facility] CATH LAB;  Service: Cardiology;  Laterality: N/A;    FISTULOGRAM Right 7/23/2019    Procedure: Fistulogram;  Surgeon: Oz Cordoba MD;  Location: [facility] CATH LAB;  Service: Cardiology;  Laterality: Right;    LAPAROSCOPIC ROBOT-ASSISTED SURGICAL REMOVAL OF KIDNEY USING DA JAIME XI Right 5/19/2022    Procedure: XI ROBOTIC NEPHRECTOMY;  Surgeon: Aidan Hendricks MD;  Location: [facility] OR [facility] FLR;  Service: Urology;  Laterality: Right;  3hrs    LAPAROSCOPIC ROBOT-ASSISTED SURGICAL REMOVAL OF KIDNEY USING DA JAIME XI Left 1/5/2023    Procedure: XI ROBOTIC NEPHRECTOMY;  Surgeon: Aidan Hendricks MD;  Location: [facility] OR [facility] FLR;  Service: Urology;  Laterality: Left;  4 hrs    LEFT HEART CATHETERIZATION Left 7/25/2019    Procedure: Left heart cath;  Surgeon: Miki Zendejas MD;  Location: [facility] CATH LAB;  Service: Cardiology;  Laterality: Left;    REPAIR  1/5/2023    Procedure: REPAIR; COLOTOMY;  Surgeon: Maikel Lamb MD;  [facility];  Service: General;;    RETINAL LASER PROCEDURE Bilateral 2018 or 2017    Dr. Rothman    VASCULAR SURGERY         Review of patient's allergies indicates:   Allergen Reactions    No known allergies        Current Facility-Administered Medications on File Prior to Encounter   Medication    lidocaine (PF) 10 mg/ml (1%) injection 10 mg     Current Outpatient Medications on File Prior to Encounter   Medication Sig    aspirin (ECOTRIN) 81 MG EC tablet Take 1 tablet (81 mg total) by mouth once daily.    atorvastatin (LIPITOR) 80 MG tablet Take 1 tablet (80 mg total) by mouth once daily.    carvediloL (COREG) 25 MG tablet Take 2 tablets (50 mg total) by mouth 2 (two) times daily with meals.     ciclopirox (PENLAC) 8 % Soln Apply topically nightly.    clopidogreL (PLAVIX) 75 mg tablet Take 1 tablet (75 mg total) by mouth once daily.    epoetin philip (PROCRIT) 4,000 unit/mL injection Inject 1.93 mLs (7,720 Units total) into the skin every Mon, Wed, Fri.    ezetimibe (ZETIA) 10 mg tablet Take 1 tablet (10 mg total) by mouth once daily.    hydrALAZINE (APRESOLINE) 100 MG tablet Take 1 tablet (100 mg total) by mouth every 8 (eight) hours.    isosorbide mononitrate (IMDUR) 120 MG 24 hr tablet Take 1 tablet (120 mg total) by mouth once daily.    methoxy peg-epoetin beta (MIRCERA INJ) 150 mcg.    minoxidiL (LONITEN) 10 MG Tab Take 1 tablet (10 mg total) by mouth 2 (two) times daily.    NIFEdipine (PROCARDIA-XL) 90 MG (OSM) 24 hr tablet Take 1 tablet (90 mg total) by mouth once daily.    nitroGLYCERIN (NITROSTAT) 0.4 MG SL tablet Place 1 tablet (0.4 mg total) under the tongue every 5 (five) minutes as needed for Chest pain.    polyethylene glycol (GLYCOLAX) 17 gram/dose powder Take 17 g by mouth daily as needed for Constipation.    ranolazine (RANEXA) 500 MG Tb12 Take 1 tablet (500 mg total) by mouth Daily.    SENSIPAR 60 mg Tab Take 60 mg by mouth daily with breakfast.    sevelamer carbonate (RENVELA) 800 mg Tab Take 2 tablets (1,600 mg total) by mouth 3 (three) times daily with meals.    vit B complex-C-folic ac-zinc (RENAPLEX) 800 mcg- 12.5 mg Tab Take 1 tablet by mouth.    XPHOZAH 20 mg Tab Take 20 mg by mouth 2 (two) times a day.     Family History       Problem Relation (Age of Onset)    Cancer Maternal Grandfather    Cataracts Mother    Diabetes Mother    Heart disease Brother    Hypertension Mother, Father, Sister, Brother    Kidney disease Brother          Tobacco Use    Smoking status: Never    Smokeless tobacco: Never   Substance and Sexual Activity    Alcohol use: Not Currently     Comment: One drink a month    Drug use: No    Sexual activity: Yes     Partners: Female     Birth control/protection: None      Review of Systems   Constitutional:  Negative for activity change, chills and fever.   HENT:  Negative for trouble swallowing.    Eyes:  Negative for photophobia and visual disturbance.   Respiratory:  Positive for shortness of breath. Negative for chest tightness and wheezing.    Cardiovascular:  Positive for chest pain. Negative for palpitations and leg swelling.   Gastrointestinal:  Negative for abdominal pain, constipation, diarrhea, nausea and vomiting.   Genitourinary:  Negative for dysuria, frequency, hematuria and urgency.   Musculoskeletal:  Negative for arthralgias, back pain and gait problem.   Skin:  Negative for color change and rash.   Neurological:  Negative for dizziness, syncope, weakness, light-headedness, numbness and headaches.   Psychiatric/Behavioral:  Negative for agitation and confusion. The patient is not nervous/anxious.      Objective:     Vital Signs (Most Recent):  Temp: 97.6 °F (36.4 °C) (12/27/24 0733)  Pulse: 72 (12/27/24 0757)  Resp: 19 (12/27/24 0733)  BP: (!) 182/90 (12/27/24 0757)  SpO2: 96 % (12/27/24 0757) Vital Signs (24h Range):  Temp:  [97.6 °F (36.4 °C)-98.2 °F (36.8 °C)] 97.6 °F (36.4 °C)  Pulse:  [67-79] 72  Resp:  [14-19] 19  SpO2:  [95 %-100 %] 96 %  BP: (162-211)/() 182/90     Weight: 77.1 kg (170 lb)  Body mass index is 26.63 kg/m².     Physical Exam  Vitals and nursing note reviewed.   Constitutional:       General: He is not in acute distress.     Appearance: Normal appearance. He is not ill-appearing.   HENT:      Head: Normocephalic and atraumatic.      Right Ear: External ear normal.      Left Ear: External ear normal.   Eyes:      Extraocular Movements: Extraocular movements intact.      Conjunctiva/sclera: Conjunctivae normal.      Pupils: Pupils are equal, round, and reactive to light.   Cardiovascular:      Rate and Rhythm: Normal rate and regular rhythm.      Pulses: Normal pulses.      Heart sounds: Normal heart sounds. No murmur heard.  Pulmonary:       Effort: Pulmonary effort is normal. No respiratory distress.      Breath sounds: Rales present.   Abdominal:      General: Abdomen is flat. Bowel sounds are normal.      Tenderness: There is no abdominal tenderness.   Musculoskeletal:         General: Normal range of motion.      Cervical back: Normal range of motion and neck supple.      Right lower leg: No edema.      Left lower leg: No edema.   Skin:     General: Skin is warm and dry.      Capillary Refill: Capillary refill takes less than 2 seconds.      Coloration: Skin is not jaundiced.   Neurological:      General: No focal deficit present.      Mental Status: He is alert and oriented to person, place, and time. Mental status is at baseline.      Cranial Nerves: No cranial nerve deficit.   Psychiatric:         Mood and Affect: Mood normal.         Behavior: Behavior normal.              CRANIAL NERVES     CN III, IV, VI   Pupils are equal, round, and reactive to light.       Significant Labs: All pertinent labs within the past 24 hours have been reviewed.  BMP:   Recent Labs   Lab 12/27/24  0356   *      K 4.1      CO2 20*   BUN 68*   CREATININE 11.9*   CALCIUM 9.3     CBC:   Recent Labs   Lab 12/27/24 0356   WBC 10.22   HGB 10.5*   HCT 32.9*        CMP:   Recent Labs   Lab 12/27/24 0356      K 4.1      CO2 20*   *   BUN 68*   CREATININE 11.9*   CALCIUM 9.3   PROT 7.1   ALBUMIN 3.4*   BILITOT 0.7   ALKPHOS 69   AST 11   ALT 8*   ANIONGAP 16       Significant Imaging: I have reviewed all pertinent imaging results/findings within the past 24 hours.    Imaging Results              X-Ray Chest AP Portable (Final result)  Result time 12/27/24 08:51:23      Final result by Edil Currie MD (12/27/24 08:51:23)                   Impression:      See above      Electronically signed by: Edil Currie MD  Date:    12/27/2024  Time:    08:51               Narrative:    EXAMINATION:  XR CHEST AP PORTABLE    CLINICAL  "HISTORY:  Chest Pain;    TECHNIQUE:  Single frontal view of the chest was performed.    COMPARISON:  No 10/30/2024 ne    FINDINGS:  Heart size upper limit of normal or mildly enlarged.  Mild accentuation of the interstitial markings.  No significant airspace consolidation or pleural effusion identified                                      Assessment/Plan:     * Chest pain  CAD  - Acute, patient reports chest pain is ongoing/resolved.  - EKG with nonspecific ST changes  - CXR with heart size upper limit of normal or mildly enlarged. Mild accentuation of the interstitial markings. No significant airspace consolidation or pleural effusion identified   - troponin 240>364>561  - ASA administered with EMS  - Continue ASA, statin, plavix, BB  - Cardiac monitoring  - PRN EKG and sl nitro for chest pain  - K>4, Mg>2  - Previous cardiac testing with multi vessel CAD treated medically with ACS   - ACS protocol initiated  - cardiology consulted; appreciate assistance   - will keep NPO as precautions      Type 2 diabetes mellitus with chronic kidney disease on chronic dialysis, without long-term current use of insulin  Patient's FSGs are controlled on current medication regimen.  Last A1c reviewed-   Lab Results   Component Value Date    HGBA1C 4.3 (L) 10/11/2024     Most recent fingerstick glucose reviewed- No results for input(s): "POCTGLUCOSE" in the last 24 hours.  Current correctional scale  Low  Maintain anti-hyperglycemic dose as follows-   Antihyperglycemics (From admission, onward)      Start     Stop Route Frequency Ordered    12/27/24 0844  insulin aspart U-100 pen 0-5 Units         -- SubQ Before meals & nightly PRN 12/27/24 0745          Hold Oral hypoglycemics while patient is in the hospital.    Hypertensive emergency  Patient has a current diagnosis of Hypertensive emergency with end organ damage evidenced by acute coronary syndrome which is uncontrolled.  Latest blood pressure and vitals reviewed-   Temp:  [97.6 " °F (36.4 °C)-98.2 °F (36.8 °C)]   Pulse:  [67-79]   Resp:  [14-19]   BP: (162-211)/()   SpO2:  [95 %-100 %] .   Patient currently off IV antihypertensives.   Home meds for hypertension were reviewed and noted below.   Hypertension Medications               carvediloL (COREG) 25 MG tablet Take 2 tablets (50 mg total) by mouth 2 (two) times daily with meals.    hydrALAZINE (APRESOLINE) 100 MG tablet Take 1 tablet (100 mg total) by mouth every 8 (eight) hours.    isosorbide mononitrate (IMDUR) 120 MG 24 hr tablet Take 1 tablet (120 mg total) by mouth once daily.    minoxidiL (LONITEN) 10 MG Tab Take 1 tablet (10 mg total) by mouth 2 (two) times daily.    NIFEdipine (PROCARDIA-XL) 90 MG (OSM) 24 hr tablet Take 1 tablet (90 mg total) by mouth once daily.    nitroGLYCERIN (NITROSTAT) 0.4 MG SL tablet Place 1 tablet (0.4 mg total) under the tongue every 5 (five) minutes as needed for Chest pain.            Medication adjustment for hospital antihypertensives is as follows- continue home meds and HD    Will aim for controlled BP reduction by medications noted above. Monitor and mitigate end organ damage as indicated.    Chronic diastolic heart failure  Patient is identified as having Diastolic (HFpEF) heart failure that is Acute on Chronic. CHF is currently uncontrolled due to volume overload due to: Continued edema of extremities. Latest ECHO performed and demonstrates- Results for orders placed during the hospital encounter of 10/28/24    Echo    Interpretation Summary    Left Ventricle: The left ventricle is normal in size. Increased ventricular mass. Normal wall thickness. There is eccentric hypertrophy. Regional wall motion abnormalities present. See diagram for wall motion findings. There is mildly reduced systolic function with a visually estimated ejection fraction of 40 - 45%. There is diastolic dysfunction.    Right Ventricle: Normal right ventricular cavity size. Wall thickness is normal. Systolic function  is normal.    Left Atrium: Left atrium is severely dilated.    Aortic Valve: There is moderate aortic valve sclerosis. There is annular calcification present. Cannot exclude bileaflet aortic valve. Mildly restricted motion.    Mitral Valve: There is moderate posterior mitral annular calcification present. There is moderate to severe regurgitation.    Tricuspid Valve: There is mild regurgitation.    Pulmonary Artery: The estimated pulmonary artery systolic pressure is 61 mmHg.    IVC/SVC: Normal venous pressure at 3 mmHg.    Pericardium: There is a small posterior effusion.  . Continue Beta Blocker ACE/ARB and monitor clinical status closely. Monitor on telemetry. Patient is off CHF pathway.  Monitor strict Is&Os and daily weights.  Place on fluid restriction of 1.5 L. Cardiology is consulted. Continue to stress to patient importance of self efficacy and  on diet for CHF. Last BNP reviewed- and noted below   Recent Labs   Lab 12/27/24  0356   BNP 1,322*   .  - Nephrology consulted for HD for volume removal          Anemia in end-stage renal disease  Anemia is likely due to chronic disease due to ESRD. Most recent hemoglobin and hematocrit are listed below.  Recent Labs     12/27/24  0356   HGB 10.5*   HCT 32.9*     Plan  - Monitor serial CBC: Daily  - Transfuse PRBC if patient becomes hemodynamically unstable, symptomatic or H/H drops below 7/21.  - Patient has not received any PRBC transfusions to date  - Patient's anemia is currently stable    ESRD on hemodialysis  Creatine stable for now. BMP reviewed- noted Estimated Creatinine Clearance: 7.2 mL/min (A) (based on SCr of 11.9 mg/dL (H)). according to latest data. Based on current GFR, CKD stage is end stage.  Monitor UOP and serial BMP and adjust therapy as needed. Renally dose meds. Avoid nephrotoxic medications and procedures.  - Nephrology consulted for HD, appreciate recs    Hemiparesis affecting right side as late effect of cerebrovascular accident  -  history noted        VTE Risk Mitigation (From admission, onward)           Ordered     heparin 25,000 units in dextrose 5% (100 units/ml) IV bolus from bag LOW INTENSITY nomogram - [facility]  As needed (PRN)        Question:  Heparin Infusion Adjustment (DO NOT MODIFY ANSWER)  Answer:  [email or web address]\Images\Pharmacy\HeparinInfusions\heparin LOW INTENSITY nomogram for [facility] [id number]    12/27/24 0930     heparin 25,000 units in dextrose 5% (100 units/ml) IV bolus from bag LOW INTENSITY nomogram - [facility]  As needed (PRN)        Question:  Heparin Infusion Adjustment (DO NOT MODIFY ANSWER)  Answer:  [email or web address]\Images\Pharmacy\HeparinInfusions\heparin LOW INTENSITY nomogram for [facility] [id number]    12/27/24 0930     heparin 25,000 units in dextrose 5% (100 units/ml) IV bolus from bag LOW INTENSITY nomogram - [facility]  Once        Question:  Heparin Infusion Adjustment (DO NOT MODIFY ANSWER)  Answer:  [email or web address]\Images\Pharmacy\HeparinInfusions\heparin LOW INTENSITY nomogram for [facility] [id number]    12/27/24 0930     heparin 25,000 units in dextrose 5% 250 mL (100 units/mL) infusion LOW INTENSITY nomogram - [facility]  Continuous        Question:  Begin at (units/kg/hr)  Answer:  12    12/27/24 0930     IP VTE HIGH RISK PATIENT  Once         12/27/24 0745     Place sequential compression device  Until discontinued         12/27/24 0745                         On 12/27/2024, patient should be placed in hospital observation services under my care in collaboration with Omar Marino MD.           Lisa Vega PA-C  Department of Hospital Medicine  [facility] - Emergency Dept

## 2024-12-27 NOTE — ED NOTES
Pt endorsing 7/10 left sided chest pain, sharp/stabbing in nature. Denies SOB, NV, or diaphoresis. Care team notified via secure chat

## 2024-12-27 NOTE — HPI
Patient is a 49M with ESRD on HD, CAD (HILLARY to mid RCA but now , mid/prox-LAD disease), HFmrEF (40-45%), CVA with RSW, RCC s/p BL nephrectomy, HTN here for chest pain. States he was watching TV when he suddenly experienced onset of dull, pressure-like chest pain on L side of chest which slightly radiated to center chest. Did not radiate to arm or jaw. Denied associated shortness of breath with onset of chest pain (though did report this to ED provider). States chest pain constant and somewhat improves with exertion. Did not diminish with SL NG. States it felt similar to previous chest pain that required HILLARY, however was not able to recall details of that chest pain episode. Does not worsen with deep breaths. No associated cough, fever, or chills. Slight nausea overnight, however overall CP not associated with N/V. States he has not missed any doses of antihypertensive regimen or missed HD sessions. Reports BP approx 140 systolic when he checks at home.     [place] [facility] maximally to 211/102. No supplemental O2 needed. Chest pain present on assessment. BNP 1322, CXR with mild interstitial edema, but no pleural effusions.  -> 364 -> 561. EKG with NSR, flattened T-waves when compared to EKG 10/30/2024, no ST changes.     Cardiology consulted for chest pain with rising troponin.     Cardiac History:   NSTEMI - PCI 2020 with HILLARY to RCA  [facility] 11/2023 with 3vd not amenable to stenting, recommended medical management    The Mid LAD lesion was 90% stenosed.    The Mid RCA lesion was 100% stenosed.    The Prox LAD lesion was 75% stenosed.  PET stress 10/31/24: 2 fixed perfusion abnormaltieis: distal inferior and apical fixed perfusion abnormality (c/w LAD disease),  moderate intensity basal inferior and inferolateral fixed perfusion abnormality (PLB/LCX territory).   TTE 10/28/24: LVEF 40 - 45%, diastolic dysfunction, LAE, PASP 61     Cardiac Meds:  ASA/Plavix, Coreg 50mg bid, Nifedipine 90 qd,  Ranolazine 500, Isosorbide mononitrate 120, Hydralazine 100 tid, Minoxidil 10 (filled 10/29), Lipitor 80, Ezetimibe

## 2024-12-27 NOTE — ED PROVIDER NOTES
History:  Haroldo Dickinson is a 49 y.o. male who presents to the ED with Chest Pain (Patient reports CP that began 2 hours ago. States that it is in the R chest. Reports that he has had pains similar in the past.)    Described as 49-year-old male with a history of CAD, hypertension, ESRD on HD presenting to the emergency department with chest pain.  He reports he has a severe pain in his left side of his chest radiating to his central chest with associated shortness of breath, feeling like his typical heart type pain.  No fevers or chills, no cough or congestion.  Tried nitro and got aspirin with EMS without improvement.    Review of Systems: All systems reviewed and are negative except as per history of present illness.    Medications:   Previous Medications    ASPIRIN (ECOTRIN) 81 MG EC TABLET    Take 1 tablet (81 mg total) by mouth once daily.    ATORVASTATIN (LIPITOR) 80 MG TABLET    Take 1 tablet (80 mg total) by mouth once daily.    CARVEDILOL (COREG) 25 MG TABLET    Take 2 tablets (50 mg total) by mouth 2 (two) times daily with meals.    CICLOPIROX (PENLAC) 8 % SOLN    Apply topically nightly.    CLOPIDOGREL (PLAVIX) 75 MG TABLET    Take 1 tablet (75 mg total) by mouth once daily.    EPOETIN VERO (PROCRIT) 4,000 UNIT/ML INJECTION    Inject 1.93 mLs (7,720 Units total) into the skin every Mon, Wed, Fri.    EZETIMIBE (ZETIA) 10 MG TABLET    Take 1 tablet (10 mg total) by mouth once daily.    HYDRALAZINE (APRESOLINE) 100 MG TABLET    Take 1 tablet (100 mg total) by mouth every 8 (eight) hours.    ISOSORBIDE MONONITRATE (IMDUR) 120 MG 24 HR TABLET    Take 1 tablet (120 mg total) by mouth once daily.    METHOXY PEG-EPOETIN BETA (MIRCERA INJ)    150 mcg.    MINOXIDIL (LONITEN) 10 MG TAB    Take 1 tablet (10 mg total) by mouth 2 (two) times daily.    NIFEDIPINE (PROCARDIA-XL) 90 MG (OSM) 24 HR TABLET    Take 1 tablet (90 mg total) by mouth once daily.    NITROGLYCERIN (NITROSTAT) 0.4 MG SL TABLET    Place 1 tablet  (0.4 mg total) under the tongue every 5 (five) minutes as needed for Chest pain.    POLYETHYLENE GLYCOL (GLYCOLAX) 17 GRAM/DOSE POWDER    Take 17 g by mouth daily as needed for Constipation.    RANOLAZINE (RANEXA) 500 MG TB12    Take 1 tablet (500 mg total) by mouth Daily.    SENSIPAR 60 MG TAB    Take 60 mg by mouth daily with breakfast.    SEVELAMER CARBONATE (RENVELA) 800 MG TAB    Take 2 tablets (1,600 mg total) by mouth 3 (three) times daily with meals.    VIT B COMPLEX-C-FOLIC AC-ZINC (RENAPLEX) 800 MCG- 12.5 MG TAB    Take 1 tablet by mouth.    XPHOZAH 20 MG TAB    Take 20 mg by mouth 2 (two) times a day.       PMH:   Past Medical History:   Diagnosis Date    CAD (coronary artery disease), native coronary artery 11/21/2019    Cataract     Diabetes mellitus     Diabetic retinopathy     Dialysis patient     DM type 2 causing renal disease, not at goal     ESRD (end stage renal disease) started dialysis 01/2014 06/05/2014    History of colon polyps 02/10/2021    History of COVID-19 12/29/2022    Hyperparathyroidism, secondary renal 06/05/2014    Hypertension     NSTEMI (non-ST elevated myocardial infarction) 12/21/2013    Organ transplant candidate 06/05/2014    Palliative care encounter 10/29/2024    Pleural effusion 06/07/2024    Pneumonia     Post PTCA 08/26/2020    RCA HILLARY 7-25-20    Renal cell carcinoma of right kidney     Renal hypertension     Stroke      PSH:   Past Surgical History:   Procedure Laterality Date    ANGIOGRAM, CORONARY, WITH LEFT HEART CATHETERIZATION N/A 7/1/2021    Procedure: Angiogram, Coronary, with Left Heart Cath;  Surgeon: Miki Zendejas MD;  Location: [facility] CATH LAB;  Service: Cardiology;  Laterality: N/A;    ANGIOGRAM, CORONARY, WITH LEFT HEART CATHETERIZATION N/A 11/28/2023    Procedure: Angiogram, Coronary, with Left Heart Cath;  Surgeon: Noah Pendleton MD;  Location: [facility] CATH LAB;  Service: Cardiology;  Laterality: N/A;    COLONOSCOPY N/A 5/3/2017    Procedure:  COLONOSCOPY;  Surgeon: Rufino Carpenter MD;  Location: [facility] (4TH FLR);  Service: Endoscopy;  Laterality: N/A;  pt states can only schedule on Wednesdays    COLONOSCOPY N/A 2/9/2021    Procedure: COLONOSCOPY;  Surgeon: Edil Wong MD;  Location: [facility] (4TH FLR);  Service: Endoscopy;  Laterality: N/A;  Dialysis MWF/ labwork day of procedure  right arm aceess  per Dr. Colin pt can hold Plavix 5 days prior see note- sm  COVID test on 2/6/21 at [facility] -     COLONOSCOPY N/A 2/10/2021    Procedure: COLONOSCOPY;  Surgeon: Robert Ayers MD;  Location: [facility] (4TH FLR);  Service: Endoscopy;  Laterality: N/A;  rescheduled due to poor bowel prep-BB  negative covid screening 2/6/21-BB  dialysis M-W-F-BB  okay to r/s for 2/9/21 and to hold Plavix per Dr. KIRAN Wong-BB  labs same day-BB    CORONARY STENT PLACEMENT N/A 7/25/2019    Procedure: INSERTION, STENT, CORONARY ARTERY;  Surgeon: Miki Zendejas MD;  Location: [facility] CATH LAB;  Service: Cardiology;  Laterality: N/A;    DIALYSIS FISTULA CREATION      FISTULOGRAM N/A 2/18/2019    Procedure: Fistulogram;  Surgeon: Yaneth De La Cruz MD;  Location: [facility] CATH LAB;  Service: Cardiology;  Laterality: N/A;    FISTULOGRAM Right 7/23/2019    Procedure: Fistulogram;  Surgeon: Oz Cordoba MD;  Location: [facility] CATH LAB;  Service: Cardiology;  Laterality: Right;    LAPAROSCOPIC ROBOT-ASSISTED SURGICAL REMOVAL OF KIDNEY USING DA JAIME XI Right 5/19/2022    Procedure: XI ROBOTIC NEPHRECTOMY;  Surgeon: Aidan Hendricks MD;  Location: [address];  Service: Urology;  Laterality: Right;  3hrs    LAPAROSCOPIC ROBOT-ASSISTED SURGICAL REMOVAL OF KIDNEY USING DA JAIME XI Left 1/5/2023    Procedure: XI ROBOTIC NEPHRECTOMY;  Surgeon: Aidan Hendricks MD;  Location: [address];  Service: Urology;  Laterality: Left;  4 hrs    LEFT HEART CATHETERIZATION Left 7/25/2019    Procedure: Left heart cath;  Surgeon: Miki Zendejas MD;  Location: [facility];  Service:  Cardiology;  Laterality: Left;    REPAIR  1/5/2023    Procedure: REPAIR; COLOTOMY;  Surgeon: Maikel Lamb MD;  Location: [facility] OR [address] [place] [address];  Service: General;;    RETINAL LASER PROCEDURE Bilateral 2018 or 2017    Dr. Rothman    VASCULAR SURGERY       Allergies: He is allergic to no known allergies.  Social History: Marital Status: single. He  reports that he has never smoked. He has never used smokeless tobacco.. He  reports that he does not currently use alcohol..       Exam:  VITAL SIGNS:   Vitals:    12/27/24 0320 12/27/24 0330 12/27/24 0433 12/27/24 0500   BP: (!) 189/93 (!) 177/97 (!) 211/102 (!) 202/98   Pulse: 78 78 71 69   Resp: 17 14 18 15   Temp:       TempSrc:       SpO2: 97% 97% 97% 95%   Weight:       Height:         Const: Awake and alert, NAD   Head: Atraumatic  Eyes: Normal Conjunctiva  ENT: Normal External Ears, Nose and Mouth.  Neck: Full range of motion. No meningismus.  Resp: Normal respiratory effort, No distress, CTAB  Cardio: Equal and intact distal pulses, RRR. AV fistula R wrist with palpable thrill  Abd: Soft, non tender, non distended.   Skin: No petechiae or rashes  Ext: No cyanosis, or edema  Neur: Awake and alert  Psych: Normal Mood and Affect    Data:  Results for orders placed or performed during the hospital encounter of 12/27/24   CBC auto differential    Collection Time: 12/27/24  3:56 AM   Result Value Ref Range    WBC 10.22 3.90 - 12.70 K/uL    RBC 3.50 (L) 4.60 - 6.20 M/uL    Hemoglobin 10.5 (L) 14.0 - 18.0 g/dL    Hematocrit 32.9 (L) 40.0 - 54.0 %    MCV 94 82 - 98 fL    MCH 30.0 27.0 - 31.0 pg    MCHC 31.9 (L) 32.0 - 36.0 g/dL    RDW 15.3 (H) 11.5 - 14.5 %    Platelets 283 150 - 450 K/uL    MPV 10.3 9.2 - 12.9 fL    Immature Granulocytes 0.4 0.0 - 0.5 %    Gran # (ANC) 7.2 1.8 - 7.7 K/uL    Immature Grans (Abs) 0.04 0.00 - 0.04 K/uL    Lymph # 1.3 1.0 - 4.8 K/uL    Mono # 1.2 (H) 0.3 - 1.0 K/uL    Eos # 0.5 0.0 - 0.5 K/uL    Baso # 0.07 0.00 - 0.20 K/uL    nRBC 0 0 /100 WBC     Gran % 70.4 38.0 - 73.0 %    Lymph % 12.2 (L) 18.0 - 48.0 %    Mono % 11.7 4.0 - 15.0 %    Eosinophil % 4.6 0.0 - 8.0 %    Basophil % 0.7 0.0 - 1.9 %    Differential Method Automated    Comprehensive metabolic panel    Collection Time: 24  3:56 AM   Result Value Ref Range    Sodium 142 136 - 145 mmol/L    Potassium 4.1 3.5 - 5.1 mmol/L    Chloride 106 95 - 110 mmol/L    CO2 20 (L) 23 - 29 mmol/L    Glucose 168 (H) 70 - 110 mg/dL    BUN 68 (H) 6 - 20 mg/dL    Creatinine 11.9 (H) 0.5 - 1.4 mg/dL    Calcium 9.3 8.7 - 10.5 mg/dL    Total Protein 7.1 6.0 - 8.4 g/dL    Albumin 3.4 (L) 3.5 - 5.2 g/dL    Total Bilirubin 0.7 0.1 - 1.0 mg/dL    Alkaline Phosphatase 69 40 - 150 U/L    AST 11 10 - 40 U/L    ALT 8 (L) 10 - 44 U/L    eGFR 4.8 (A) >60 mL/min/1.73 m^2    Anion Gap 16 8 - 16 mmol/L   Troponin I High Sensitivity #1    Collection Time: 24  3:56 AM   Result Value Ref Range    Troponin I High Sensitivity 240 (H) 0 - 35 ng/L   B-Type natriuretic peptide (BNP)    Collection Time: 24  3:56 AM   Result Value Ref Range    BNP 1,322 (H) 0 - 99 pg/mL     *Note: Due to a large number of results and/or encounters for the requested time period, some results have not been displayed. A complete set of results can be found in Results Review.     Imaging Results              X-Ray Chest AP Portable (In process)                   12-LEAD EKG INTERPRETATION BY ME:  Rate/Rhythm: Normal Sinus Rhythm with rate of 75 beats per minute  QRS, ST, T-waves: LVH, lateral TWI unchanged from prior 10/31/24  Impression: LVH, TWI lateral leads unchanged from prior    Labs & Imaging studies were reviewed independently by me.     Medical Decision Makin-year-old male presenting to the emergency department with chest pain.  On chart review, he had a stress test performed on 10/31/2024 showing:      Interpretation Summary  Show Result Comparison     There are two significant perfusion abnormalities.    Perfusion Abnormality #1  - There is a large sized, severe intensity distal inferior and apical fixed perfusion abnormality involving 20% of LV myocardium in the distribution of the distal LAD territory consistent with a transmural scar.    Within the perfusion abnormality, absolute myocardial perfusion (cc/min/gm) averaged 0.94 cc/min/g at rest, 0.35 cc/min/g at stress, and CFR was 1.06, which equates to severely reduced coronary flow capacity within the LAD territory.    Perfusion Abnormality #2 - There is a small sized, moderate intensity basal inferior and inferolateral fixed perfusion abnormality involving 10% of LV myocardium in the distribution of the PLB/LCX territory consistent with a transmural scar.    Within the perfusion abnormality, absolute myocardial perfusion (cc/min/gm) averaged 0.37 cc/min/g at rest, 0.39 cc/min/g at stress, and CFR was 1.05, which equates to severely reduced coronary flow capacity within the RCA territory.    There are no other significant perfusion abnormalities.    CT attenuation images demonstrate severe diffuse coronary calcifications in the LAD, LCX and RCA territory and moderate diffuse aortic calcifications in the descending aorta.    The gated perfusion images showed an ejection fraction of 38% at rest and 36% during stress. A normal ejection fraction is greater than 47%.    There is regional akinesis at rest of the basal inferior and inferolateral wall(s) and regional akinesis during stress of the basal inferior and inferolateral wall(s). There is regional akinesis at rest of the apical wall(s) and regional akinesis during stress of the apical wall(s).    The study's ECG is uninterpretable.    When compared to a prior study from 6/15/2021, there are significant changes.  There is now a fixed defect in the apex and basal inferior/inferolateral walls. The reversible perfusion abnormality in the inferior wall is no longer present. There has been progression of CAD.    He was managed medically and  has been compliant on all of his medications.  He is due for dialysis this morning.  Laboratory studies show an elevated troponin. We have switched to the high sensitivity troponin test and this cannot be compared to his prior troponin levels, which are chronically elevated.  BUN and creatinine in her up consistent with a history of ESRD, though potassium is normal at 4.1.  His BNP is also elevated.  He does not make any urine.  Nephrology consultation was placed.  He has normal oxygen saturation on room air at this time.  Chest x-ray does not show pneumonia or pneumothorax on my evaluation, though he does have signs of mild fluid overload.  He is chronically anemic, stable at baseline.  Blood pressure was quite elevated, he was given his home medications.  He is admitted to Hospital Medicine for further ACS evaluation and dialysis.    Clinical Impression:  1. Hypervolemia, unspecified hypervolemia type    2. Chest pain    3. ESRD (end stage renal disease)                   Nallely Soliman MD  12/27/24 8477

## 2024-12-27 NOTE — ED NOTES
Assumed care for pt after recieving report from nightshift RN. Pt resting in bed in NAD, RR e/u. Vital signs stable and within desired limits at this time of assessment. Pt offered bathroom assistance and denies need at this time. Explanation of care/wait provided. Pt verbalizes no needs at this time. Bed in low, locked position with rails up and call bell in reach. Pt's white board updated with today's care team and plan.

## 2024-12-28 LAB
ALBUMIN SERPL BCP-MCNC: 3.2 G/DL (ref 3.5–5.2)
ALP SERPL-CCNC: 82 U/L (ref 40–150)
ALT SERPL W/O P-5'-P-CCNC: 9 U/L (ref 10–44)
ANION GAP SERPL CALC-SCNC: 13 MMOL/L (ref 8–16)
APTT PPP: 31.9 SEC (ref 21–32)
APTT PPP: 33.7 SEC (ref 21–32)
ASCENDING AORTA: 3.02 CM
AST SERPL-CCNC: 21 U/L (ref 10–40)
AV AREA BY CONTINUOUS VTI: 2.4 CM2
AV INDEX (PROSTH): 0.73
AV LVOT MEAN GRADIENT: 3 MMHG
AV LVOT PEAK GRADIENT: 5 MMHG
AV MEAN GRADIENT: 13.7 MMHG
AV PEAK GRADIENT: 23 MMHG
AV VALVE AREA BY VELOCITY RATIO: 1.7 CM²
AV VALVE AREA: 2.5 CM²
AV VELOCITY RATIO: 0.5
BASOPHILS # BLD AUTO: 0.01 K/UL (ref 0–0.2)
BASOPHILS NFR BLD: 0.1 % (ref 0–1.9)
BILIRUB SERPL-MCNC: 0.9 MG/DL (ref 0.1–1)
BSA FOR ECHO PROCEDURE: 1.77 M2
BUN SERPL-MCNC: 41 MG/DL (ref 6–20)
CALCIUM SERPL-MCNC: 9.1 MG/DL (ref 8.7–10.5)
CHLORIDE SERPL-SCNC: 98 MMOL/L (ref 95–110)
CO2 SERPL-SCNC: 24 MMOL/L (ref 23–29)
CREAT SERPL-MCNC: 9.3 MG/DL (ref 0.5–1.4)
CV ECHO LV RWT: 0.52 CM
DIFFERENTIAL METHOD BLD: ABNORMAL
DOP CALC AO PEAK VEL: 2.4 M/S
DOP CALC AO VTI: 35.6 CM
DOP CALC LVOT AREA: 3.5 CM2
DOP CALC LVOT DIAMETER: 2.1 CM
DOP CALC LVOT PEAK VEL: 1.2 M/S
DOP CALC LVOT STROKE VOLUME: 89.7 CM3
DOP CALCLVOT PEAK VEL VTI: 25.9 CM
E WAVE DECELERATION TIME: 137.41 MS
E/A RATIO: 1.18
E/E' RATIO: 14.18 M/S
ECHO EF ESTIMATED: 64 %
ECHO LV POSTERIOR WALL: 1.3 CM (ref 0.6–1.1)
EOSINOPHIL # BLD AUTO: 0.6 K/UL (ref 0–0.5)
EOSINOPHIL NFR BLD: 6.3 % (ref 0–8)
ERYTHROCYTE [DISTWIDTH] IN BLOOD BY AUTOMATED COUNT: 15.2 % (ref 11.5–14.5)
EST. GFR  (NO RACE VARIABLE): 6.4 ML/MIN/1.73 M^2
FRACTIONAL SHORTENING: 36 % (ref 28–44)
GLUCOSE SERPL-MCNC: 130 MG/DL (ref 70–110)
HCT VFR BLD AUTO: 32.7 % (ref 40–54)
HGB BLD-MCNC: 10.7 G/DL (ref 14–18)
IMM GRANULOCYTES # BLD AUTO: 0.05 K/UL (ref 0–0.04)
IMM GRANULOCYTES NFR BLD AUTO: 0.5 % (ref 0–0.5)
INTERVENTRICULAR SEPTUM: 1.4 CM (ref 0.6–1.1)
IVRT: 85.63 MS
LA MAJOR: 5.97 CM
LA MINOR: 6.01 CM
LA WIDTH: 4.5 CM
LEFT ATRIUM SIZE: 4.37 CM
LEFT ATRIUM VOLUME INDEX MOD: 31.5 ML/M2
LEFT ATRIUM VOLUME INDEX: 55.3 ML/M2
LEFT ATRIUM VOLUME MOD: 56.98 ML
LEFT ATRIUM VOLUME: 100.12 CM3
LEFT INTERNAL DIMENSION IN SYSTOLE: 3.2 CM (ref 2.1–4)
LEFT VENTRICLE DIASTOLIC VOLUME INDEX: 64.17 ML/M2
LEFT VENTRICLE DIASTOLIC VOLUME: 116.14 ML
LEFT VENTRICLE MASS INDEX: 152.7 G/M2
LEFT VENTRICLE SYSTOLIC VOLUME INDEX: 22.8 ML/M2
LEFT VENTRICLE SYSTOLIC VOLUME: 41.27 ML
LEFT VENTRICULAR INTERNAL DIMENSION IN DIASTOLE: 5 CM (ref 3.5–6)
LEFT VENTRICULAR MASS: 276.4 G
LV LATERAL E/E' RATIO: 13
LV SEPTAL E/E' RATIO: 15.6
LYMPHOCYTES # BLD AUTO: 1.5 K/UL (ref 1–4.8)
LYMPHOCYTES NFR BLD: 14.3 % (ref 18–48)
MAGNESIUM SERPL-MCNC: 1.8 MG/DL (ref 1.6–2.6)
MCH RBC QN AUTO: 30.9 PG (ref 27–31)
MCHC RBC AUTO-ENTMCNC: 32.7 G/DL (ref 32–36)
MCV RBC AUTO: 95 FL (ref 82–98)
MONOCYTES # BLD AUTO: 1 K/UL (ref 0.3–1)
MONOCYTES NFR BLD: 9.9 % (ref 4–15)
MV PEAK A VEL: 0.66 M/S
MV PEAK E VEL: 0.78 M/S
NEUTROPHILS # BLD AUTO: 7 K/UL (ref 1.8–7.7)
NEUTROPHILS NFR BLD: 68.9 % (ref 38–73)
NRBC BLD-RTO: 0 /100 WBC
PHOSPHATE SERPL-MCNC: 4.8 MG/DL (ref 2.7–4.5)
PISA TR MAX VEL: 2.47 M/S
PLATELET # BLD AUTO: 288 K/UL (ref 150–450)
PMV BLD AUTO: 10.1 FL (ref 9.2–12.9)
POCT GLUCOSE: 146 MG/DL (ref 70–110)
POCT GLUCOSE: 148 MG/DL (ref 70–110)
POCT GLUCOSE: 169 MG/DL (ref 70–110)
POCT GLUCOSE: 191 MG/DL (ref 70–110)
POTASSIUM SERPL-SCNC: 4.1 MMOL/L (ref 3.5–5.1)
PROT SERPL-MCNC: 7.2 G/DL (ref 6–8.4)
RA MAJOR: 4.39 CM
RA WIDTH: 3.49 CM
RBC # BLD AUTO: 3.46 M/UL (ref 4.6–6.2)
RV TISSUE DOPPLER FREE WALL SYSTOLIC VELOCITY 1 (APICAL 4 CHAMBER VIEW): 17.39 CM/S
SINUS: 3.29 CM
SODIUM SERPL-SCNC: 135 MMOL/L (ref 136–145)
STJ: 2.75 CM
TDI LATERAL: 0.06 M/S
TDI SEPTAL: 0.05 M/S
TDI: 0.06 M/S
TR MAX PG: 24 MMHG
TRICUSPID ANNULAR PLANE SYSTOLIC EXCURSION: 1.56 CM
TROPONIN I SERPL DL<=0.01 NG/ML-MCNC: 9576 NG/L (ref 0–35)
TROPONIN I SERPL DL<=0.01 NG/ML-MCNC: ABNORMAL NG/L (ref 0–35)
TV PEAK GRADIENT: 25 MMHG
WBC # BLD AUTO: 10.18 K/UL (ref 3.9–12.7)
Z-SCORE OF LEFT VENTRICULAR DIMENSION IN END DIASTOLE: -0.02
Z-SCORE OF LEFT VENTRICULAR DIMENSION IN END SYSTOLE: 0.27

## 2024-12-28 PROCEDURE — 84484 ASSAY OF TROPONIN QUANT: CPT | Mod: 91

## 2024-12-28 PROCEDURE — 25000003 PHARM REV CODE 250

## 2024-12-28 PROCEDURE — 85025 COMPLETE CBC W/AUTO DIFF WBC: CPT | Performed by: PHYSICIAN ASSISTANT

## 2024-12-28 PROCEDURE — 63600175 PHARM REV CODE 636 W HCPCS: Performed by: PHYSICIAN ASSISTANT

## 2024-12-28 PROCEDURE — 84100 ASSAY OF PHOSPHORUS: CPT | Performed by: PHYSICIAN ASSISTANT

## 2024-12-28 PROCEDURE — 36415 COLL VENOUS BLD VENIPUNCTURE: CPT | Mod: XB

## 2024-12-28 PROCEDURE — 25500020 PHARM REV CODE 255: Performed by: STUDENT IN AN ORGANIZED HEALTH CARE EDUCATION/TRAINING PROGRAM

## 2024-12-28 PROCEDURE — 80053 COMPREHEN METABOLIC PANEL: CPT | Performed by: PHYSICIAN ASSISTANT

## 2024-12-28 PROCEDURE — 36415 COLL VENOUS BLD VENIPUNCTURE: CPT | Mod: XB | Performed by: STUDENT IN AN ORGANIZED HEALTH CARE EDUCATION/TRAINING PROGRAM

## 2024-12-28 PROCEDURE — 83735 ASSAY OF MAGNESIUM: CPT | Performed by: PHYSICIAN ASSISTANT

## 2024-12-28 PROCEDURE — 85730 THROMBOPLASTIN TIME PARTIAL: CPT | Performed by: STUDENT IN AN ORGANIZED HEALTH CARE EDUCATION/TRAINING PROGRAM

## 2024-12-28 PROCEDURE — 84484 ASSAY OF TROPONIN QUANT: CPT | Performed by: PHYSICIAN ASSISTANT

## 2024-12-28 PROCEDURE — 21400001 HC TELEMETRY ROOM

## 2024-12-28 PROCEDURE — 63600175 PHARM REV CODE 636 W HCPCS: Performed by: STUDENT IN AN ORGANIZED HEALTH CARE EDUCATION/TRAINING PROGRAM

## 2024-12-28 PROCEDURE — 96366 THER/PROPH/DIAG IV INF ADDON: CPT

## 2024-12-28 PROCEDURE — 25000003 PHARM REV CODE 250: Performed by: PHYSICIAN ASSISTANT

## 2024-12-28 RX ORDER — HEPARIN SODIUM,PORCINE/D5W 25000/250
0-40 INTRAVENOUS SOLUTION INTRAVENOUS CONTINUOUS
Status: DISCONTINUED | OUTPATIENT
Start: 2024-12-28 | End: 2024-12-30

## 2024-12-28 RX ADMIN — HEPARIN SODIUM AND DEXTROSE 12 UNITS/KG/HR: 10000; 5 INJECTION INTRAVENOUS at 12:12

## 2024-12-28 RX ADMIN — ASPIRIN 81 MG: 81 TABLET, COATED ORAL at 08:12

## 2024-12-28 RX ADMIN — MINOXIDIL 10 MG: 10 TABLET ORAL at 08:12

## 2024-12-28 RX ADMIN — CARVEDILOL 50 MG: 25 TABLET, FILM COATED ORAL at 08:12

## 2024-12-28 RX ADMIN — CINACALCET HYDROCHLORIDE 60 MG: 30 TABLET, FILM COATED ORAL at 08:12

## 2024-12-28 RX ADMIN — ISOSORBIDE MONONITRATE 120 MG: 60 TABLET, EXTENDED RELEASE ORAL at 08:12

## 2024-12-28 RX ADMIN — SEVELAMER CARBONATE 1600 MG: 800 TABLET, FILM COATED ORAL at 08:12

## 2024-12-28 RX ADMIN — SEVELAMER CARBONATE 1600 MG: 800 TABLET, FILM COATED ORAL at 04:12

## 2024-12-28 RX ADMIN — HEPARIN SODIUM AND DEXTROSE 15 UNITS/KG/HR: 10000; 5 INJECTION INTRAVENOUS at 10:12

## 2024-12-28 RX ADMIN — SEVELAMER CARBONATE 1600 MG: 800 TABLET, FILM COATED ORAL at 11:12

## 2024-12-28 RX ADMIN — CLOPIDOGREL BISULFATE 75 MG: 75 TABLET ORAL at 08:12

## 2024-12-28 RX ADMIN — HUMAN ALBUMIN MICROSPHERES AND PERFLUTREN 0.11 MG: 10; .22 INJECTION, SOLUTION INTRAVENOUS at 10:12

## 2024-12-28 RX ADMIN — EZETIMIBE 10 MG: 10 TABLET ORAL at 08:12

## 2024-12-28 RX ADMIN — RANOLAZINE 1000 MG: 500 TABLET, EXTENDED RELEASE ORAL at 04:12

## 2024-12-28 RX ADMIN — MINOXIDIL 10 MG: 10 TABLET ORAL at 10:12

## 2024-12-28 RX ADMIN — ATORVASTATIN CALCIUM 80 MG: 40 TABLET, FILM COATED ORAL at 08:12

## 2024-12-28 NOTE — HPI
49M with ESRD on HD, CAD (HILLARY to mid RCA but now , mid/prox-LAD disease), HFmrEF (40-45%), CVA with RSW, RCC s/p BL nephrectomy, HTN here for chest pain. States he was watching TV when he suddenly experienced onset of dull, pressure-like chest pain on L side of chest which slightly radiated to center chest. HIS SBP on presentation was in 200s and has volume on exam. Underwent dialysis and blood pressures controlled now. Patient denies having any more chest pain today.

## 2024-12-28 NOTE — PLAN OF CARE
Problem: Adult Inpatient Plan of Care  Goal: Plan of Care Review  Outcome: Progressing  Goal: Patient-Specific Goal (Individualized)  Outcome: Progressing  Goal: Absence of Hospital-Acquired Illness or Injury  Outcome: Progressing  Goal: Optimal Comfort and Wellbeing  Outcome: Progressing  Goal: Readiness for Transition of Care  Outcome: Progressing     Problem: Hemodialysis  Goal: Safe, Effective Therapy Delivery  Outcome: Progressing  Goal: Effective Tissue Perfusion  Outcome: Progressing  Goal: Absence of Infection Signs and Symptoms  Outcome: Progressing     Problem: Diabetes Comorbidity  Goal: Blood Glucose Level Within Targeted Range  Outcome: Progressing     Problem: Chest Pain  Goal: Resolution of Chest Pain Symptoms  Outcome: Progressing     Problem: Hypertension Acute  Goal: Blood Pressure Within Desired Range  Outcome: Progressing       Plan of care and expectation from staff discussed with patient. Cardiology consult done this morning. Heparin continuous infusion ordered, however was cancelled. BP medication adjusted by cardiology team. Patient c/o chest pain on arrival to dialysis. Nitro Sl given and patient placed on oxygen. 2L removed in dialysis. Patient back to room this evening and denies chest pain. Troponin monitored. Start cardiology consult placed this evening along with stat Echo. Heparin infusion ordered to start pending aptt result. Lab notified.     Cardiac monitor on, NSR. Monitored by remote tele tech. No ectopy reported to me.

## 2024-12-28 NOTE — ASSESSMENT & PLAN NOTE
Patient is identified as having Diastolic (HFpEF) heart failure that is Chronic. CHF is currently controlled.   Latest ECHO performed and demonstrates- Results for orders placed during the hospital encounter of 12/27/24    Echo    Interpretation Summary    Left Ventricle: The left ventricle is normal in size. Increased ventricular mass. Moderately increased wall thickness. There is moderate concentric hypertrophy. Regional wall motion abnormalities present. There is low normal systolic function with a visually estimated ejection fraction of 50 - 55%. Grade II diastolic dysfunction.    Right Ventricle: Normal right ventricular cavity size. Wall thickness is normal. Systolic function is normal.    The left atrium is severely dilated.    Aortic Valve: The aortic valve is a trileaflet valve. There is moderate aortic valve sclerosis. Mildly restricted motion.    Mitral Valve: There is moderate posterior mitral annular calcification present. There is mild regurgitation.    Pulmonary Artery: The estimated pulmonary artery systolic pressure is >25 mmHg.    IVC/SVC: The IVC was not visualized.    Pericardium: There is a small anterior effusion.    - Continue Beta Blocker and monitor clinical status closely.   - Monitor on telemetry.  - Patient is off CHF pathway.   - Monitor strict Is&Os and daily weights. Place on fluid restriction of 1.5 L.   - Continue to stress to patient importance of self efficacy and  on diet for CHF.  - Last BNP reviewed- and noted below   Recent Labs   Lab 12/27/24  0356   BNP 1,322*   - Now appears euvolemic after HD on 12/27

## 2024-12-28 NOTE — ASSESSMENT & PLAN NOTE
Anemia is likely due to chronic disease due to ESRD. Most recent hemoglobin and hematocrit are listed below.  Recent Labs     12/27/24  0356 12/27/24  1909 12/28/24  0340   HGB 10.5* 11.0* 10.7*   HCT 32.9* 34.4* 32.7*     Plan  - Monitor serial CBC: Daily  - Transfuse PRBC if patient becomes hemodynamically unstable, symptomatic or H/H drops below 7/21.  - Patient has not received any PRBC transfusions to date  - Patient's anemia is currently stable

## 2024-12-28 NOTE — SUBJECTIVE & OBJECTIVE
Past Medical History:   Diagnosis Date    CAD (coronary artery disease), native coronary artery 11/21/2019    Cataract     Diabetes mellitus     Diabetic retinopathy     Dialysis patient     DM type 2 causing renal disease, not at goal     ESRD (end stage renal disease) started dialysis 01/2014 06/05/2014    History of colon polyps 02/10/2021    History of COVID-19 12/29/2022    Hyperparathyroidism, secondary renal 06/05/2014    Hypertension     NSTEMI (non-ST elevated myocardial infarction) 12/21/2013    Organ transplant candidate 06/05/2014    Palliative care encounter 10/29/2024    Pleural effusion 06/07/2024    Pneumonia     Post PTCA 08/26/2020    RCA HILLARY 7-25-20    Renal cell carcinoma of right kidney     Renal hypertension     Stroke        Past Surgical History:   Procedure Laterality Date    ANGIOGRAM, CORONARY, WITH LEFT HEART CATHETERIZATION N/A 7/1/2021    Procedure: Angiogram, Coronary, with Left Heart Cath;  Surgeon: Miki Zendejas MD;  Location: [facility] CATH LAB;  Service: Cardiology;  Laterality: N/A;    ANGIOGRAM, CORONARY, WITH LEFT HEART CATHETERIZATION N/A 11/28/2023    Procedure: Angiogram, Coronary, with Left Heart Cath;  Surgeon: Noah Pendleton MD;  Location: [facility] CATH LAB;  Service: Cardiology;  Laterality: N/A;    COLONOSCOPY N/A 5/3/2017    Procedure: COLONOSCOPY;  Surgeon: Rufino Carpenter MD;  Location: [facility] (4TH FLR);  Service: Endoscopy;  Laterality: N/A;  pt states can only schedule on Wednesdays    COLONOSCOPY N/A 2/9/2021    Procedure: COLONOSCOPY;  Surgeon: Edil Wong MD;  Location: [facility] (4TH FLR);  Service: Endoscopy;  Laterality: N/A;  Dialysis MWF/ labwork day of procedure  right arm aceess  per Dr. Colin pt can hold Plavix 5 days prior see note- sm  COVID test on 2/6/21 at [facility] - sm    COLONOSCOPY N/A 2/10/2021    Procedure: COLONOSCOPY;  Surgeon: Robert Ayers MD;  Location: [facility] (4TH FLR);  Service: Endoscopy;  Laterality: N/A;  rescheduled due to poor  bowel prep-BB  negative covid screening 2/6/21-BB  dialysis M-W-F-BB  okay to r/s for 2/9/21 and to hold Plavix per Dr. KIRAN Wong-BB  labs same day-BB    CORONARY STENT PLACEMENT N/A 7/25/2019    Procedure: INSERTION, STENT, CORONARY ARTERY;  Surgeon: Miki Zendejas MD;  Location: [facility] CATH LAB;  Service: Cardiology;  Laterality: N/A;    DIALYSIS FISTULA CREATION      FISTULOGRAM N/A 2/18/2019    Procedure: Fistulogram;  Surgeon: Yaneth De La Cruz MD;  Location: [facility] CATH LAB;  Service: Cardiology;  Laterality: N/A;    FISTULOGRAM Right 7/23/2019    Procedure: Fistulogram;  Surgeon: Oz Cordoba MD;  Location: [facility] CATH LAB;  Service: Cardiology;  Laterality: Right;    LAPAROSCOPIC ROBOT-ASSISTED SURGICAL REMOVAL OF KIDNEY USING DA JAIME XI Right 5/19/2022    Procedure: XI ROBOTIC NEPHRECTOMY;  Surgeon: Aidan Hendricks MD;  Location: [address];  Service: Urology;  Laterality: Right;  3hrs    LAPAROSCOPIC ROBOT-ASSISTED SURGICAL REMOVAL OF KIDNEY USING DA JAIME XI Left 1/5/2023    Procedure: XI ROBOTIC NEPHRECTOMY;  Surgeon: Aidan Hendricks MD;  [facility];  Service: Urology;  Laterality: Left;  4 hrs    LEFT HEART CATHETERIZATION Left 7/25/2019    Procedure: Left heart cath;  Surgeon: Miki Zendejas MD;  Location: [facility] CATH LAB;  Service: Cardiology;  Laterality: Left;    REPAIR  1/5/2023    Procedure: REPAIR; COLOTOMY;  Surgeon: Maikel Lamb MD;  Location: [address];  Service: General;;    RETINAL LASER PROCEDURE Bilateral 2018 or 2017    Dr. Rothman    VASCULAR SURGERY         Review of patient's allergies indicates:   Allergen Reactions    No known allergies        PTA Medications   Medication Sig    aspirin (ECOTRIN) 81 MG EC tablet Take 1 tablet (81 mg total) by mouth once daily.    atorvastatin (LIPITOR) 80 MG tablet Take 1 tablet (80 mg total) by mouth once daily.    carvediloL (COREG) 25 MG tablet Take 2 tablets (50 mg total) by mouth 2 (two) times daily  with meals.    ciclopirox (PENLAC) 8 % Soln Apply topically nightly.    clopidogreL (PLAVIX) 75 mg tablet Take 1 tablet (75 mg total) by mouth once daily.    epoetin philip (PROCRIT) 4,000 unit/mL injection Inject 1.93 mLs (7,720 Units total) into the skin every Mon, Wed, Fri.    ezetimibe (ZETIA) 10 mg tablet Take 1 tablet (10 mg total) by mouth once daily.    hydrALAZINE (APRESOLINE) 100 MG tablet Take 1 tablet (100 mg total) by mouth every 8 (eight) hours.    isosorbide mononitrate (IMDUR) 120 MG 24 hr tablet Take 1 tablet (120 mg total) by mouth once daily.    methoxy peg-epoetin beta (MIRCERA INJ) 150 mcg.    NIFEdipine (PROCARDIA-XL) 90 MG (OSM) 24 hr tablet Take 1 tablet (90 mg total) by mouth once daily.    nitroGLYCERIN (NITROSTAT) 0.4 MG SL tablet Place 1 tablet (0.4 mg total) under the tongue every 5 (five) minutes as needed for Chest pain.    polyethylene glycol (GLYCOLAX) 17 gram/dose powder Take 17 g by mouth daily as needed for Constipation.    ranolazine (RANEXA) 500 MG Tb12 Take 1 tablet (500 mg total) by mouth Daily.    sevelamer carbonate (RENVELA) 800 mg Tab Take 2 tablets (1,600 mg total) by mouth 3 (three) times daily with meals.    vit B complex-C-folic ac-zinc (RENAPLEX) 800 mcg- 12.5 mg Tab Take 1 tablet by mouth.     Family History       Problem Relation (Age of Onset)    Cancer Maternal Grandfather    Cataracts Mother    Diabetes Mother    Heart disease Brother    Hypertension Mother, Father, Sister, Brother    Kidney disease Brother          Tobacco Use    Smoking status: Never    Smokeless tobacco: Never   Substance and Sexual Activity    Alcohol use: Not Currently     Comment: One drink a month    Drug use: No    Sexual activity: Yes     Partners: Female     Birth control/protection: None     Review of Systems   Constitutional: Negative.   HENT: Negative.     Eyes: Negative.    Cardiovascular:  Positive for chest pain.   Respiratory: Negative.     Endocrine: Negative.     Hematologic/Lymphatic: Negative.    Skin: Negative.    Musculoskeletal: Negative.      Objective:     Vital Signs (Most Recent):  Temp: 98.4 °F (36.9 °C) (12/27/24 1917)  Pulse: 71 (12/27/24 1917)  Resp: 20 (12/27/24 1917)  BP: 130/72 (12/27/24 1917)  SpO2: 100 % (12/27/24 1917) Vital Signs (24h Range):  Temp:  [97 °F (36.1 °C)-98.4 °F (36.9 °C)] 98.4 °F (36.9 °C)  Pulse:  [66-80] 71  Resp:  [14-20] 20  SpO2:  [95 %-100 %] 100 %  BP: (120-211)/() 130/72     Weight: 77.1 kg (170 lb)  Body mass index is 26.63 kg/m².    SpO2: 100 %         Intake/Output Summary (Last 24 hours) at 12/27/2024 2100  Last data filed at 12/27/2024 1733  Gross per 24 hour   Intake 720 ml   Output 2600 ml   Net -1880 ml       Lines/Drains/Airways       Peripheral Intravenous Line  Duration                  Hemodialysis AV Fistula Right forearm -- days         Peripheral IV - Single Lumen 12/27/24 0235 18 G Anterior;Left Forearm <1 day                     Physical Exam  Constitutional:       Appearance: Normal appearance.   HENT:      Head: Normocephalic and atraumatic.   Cardiovascular:      Rate and Rhythm: Normal rate and regular rhythm.   Pulmonary:      Effort: Pulmonary effort is normal.      Breath sounds: Normal breath sounds.   Musculoskeletal:         General: Normal range of motion.      Cervical back: Normal range of motion.   Skin:     General: Skin is warm.      Capillary Refill: Capillary refill takes less than 2 seconds.   Neurological:      Mental Status: He is alert.            Significant Labs: All pertinent lab results from the last 24 hours have been reviewed.

## 2024-12-28 NOTE — PROGRESS NOTES
[facility] [address] [place] [address] Medicine  Progress Note    Patient Name: Haroldo Dickinson  MRN: [id number]  Patient Class: IP- Inpatient   Admission Date: 12/27/2024  Length of Stay: 0 days  Attending Physician: Omar Marino MD  Primary Care Provider: Mireya Larose MD    Principal Problem:NSTEMI (non-ST elevated myocardial infarction)    HPI:  Haroldo Dickinson is a 49 y.o. male with PMHx significant for mutli vessel CAD, HTN, ESRD on HD MWF, HLD admitted to  for ACS rule out. Patient reports left sided chest pressure that radiates to his substernal area with associated SOB. Reports that these symptoms are typical of his usually chest pain, but states today was more intensified. EMS was called, and patient given ASA and nitro en route with improvement of symptoms. Endorses compliance with medications and HD. Denies fever/chills, diaphoresis, lightheadedness, HA, cough, congestion, abdominal pain, n/v/d, urinary symptoms, changes in BMs, numbness/tingling, weakness. Of note, patient had abnormal PET stress test in October of this year and was ultimately treated with 48 hours of ACS.     In the ED, patient hypertensive to max 208/101. Remaining vitals stable. BNP 1322. HS troponin 240>364. K 4.1. Hgb 10.5. EKG with NSR and nonspecific ST changes. Given home antihypertensive meds (coreg, hydralazine, imdur, nifedipine) with mild improvement of BP. Given morphine and nitro x1 in the ED with temporary improvement of pain. Nephrology consulted for HD.    Overview/Hospital Course:  Mr. Dickinson was admitted to hospital medicine for NSTEMI, thought to be secondary to demand from hypertensive emergency and hypervolemia in the setting of ESRD. EKG with nonspecific ST changes. Pt was dialyzed on 12/27 with significant improvement in BP and volume status. Pt with intermittent and ongoing chest pain and troponin persistently increasing, now up to >10,000. Cardiology and interventional cardiology consulted and report the  patient's known CAD is not amenable to PCI or CABG & recommend increasing ranolazine and adding imdur to daily regimen. ACS protocol with heparin gtts initiated by hospital medicine with resolution of chest pain. Plan to continue heparin gtts and trend troponin to peak.     Interval History: Pt seen and examined by me this morning. Hypotensive on increased BP regimen recommended by cardiology o/w VSSAF. Holding parameters placed for nifedipine and hydralazine. Pt reports chest pain resolved with initiation of heparin gtts overnight. Troponin continues to rise as of now, but timing between draws was spaced given patient request and lack of ongoing chest pain.     Review of Systems   Constitutional:  Negative for chills and fever.   Respiratory:  Positive for shortness of breath. Negative for chest tightness.    Cardiovascular:  Positive for chest pain. Negative for leg swelling.   Gastrointestinal:  Negative for abdominal pain and nausea.   Neurological:  Negative for dizziness and weakness.     Objective:     Vital Signs (Most Recent):  Temp: 97.9 °F (36.6 °C) (12/28/24 1137)  Pulse: 84 (12/28/24 1401)  Resp: 18 (12/28/24 1137)  BP: (!) 96/55 (12/28/24 1401)  SpO2: 100 % (12/28/24 1401) Vital Signs (24h Range):  Temp:  [97 °F (36.1 °C)-98.5 °F (36.9 °C)] 97.9 °F (36.6 °C)  Pulse:  [69-85] 84  Resp:  [16-20] 18  SpO2:  [95 %-100 %] 100 %  BP: ()/(53-77) 96/55     Weight: 70.1 kg (154 lb 8.7 oz)  Body mass index is 24.2 kg/m².    Intake/Output Summary (Last 24 hours) at 12/28/2024 1442  Last data filed at 12/27/2024 1733  Gross per 24 hour   Intake 300 ml   Output 2600 ml   Net -2300 ml         Physical Exam  Vitals and nursing note reviewed.   Constitutional:       General: He is not in acute distress.     Appearance: He is well-developed. He is ill-appearing (chronically). He is not toxic-appearing.   HENT:      Mouth/Throat:      Mouth: Mucous membranes are moist.   Eyes:      Extraocular Movements:  Extraocular movements intact.      Conjunctiva/sclera: Conjunctivae normal.   Cardiovascular:      Rate and Rhythm: Normal rate and regular rhythm.   Pulmonary:      Effort: Pulmonary effort is normal.      Breath sounds: Normal breath sounds. No wheezing or rales.   Abdominal:      Palpations: Abdomen is soft.      Tenderness: There is no abdominal tenderness.   Musculoskeletal:         General: No tenderness.      Right lower leg: No edema.      Left lower leg: No edema.   Skin:     General: Skin is warm and dry.   Neurological:      Mental Status: He is alert and oriented to person, place, and time.   Psychiatric:         Behavior: Behavior normal.             Significant Labs: All pertinent labs within the past 24 hours have been reviewed.  Troponin:   Recent Labs   Lab 12/27/24  1642 12/28/24  0340 12/28/24  0810   TROPONINIHS 5586* [id number]* [id number]*       Significant Imaging: I have reviewed all pertinent imaging results/findings within the past 24 hours.    Assessment and Plan     * NSTEMI (non-ST elevated myocardial infarction)  Triple vessel CAD  Demand ischemia   - Acute, patient reports chest pain is ongoing and intermittent - none at present  - EKG with nonspecific ST changes  - CXR with heart size upper limit of normal or mildly enlarged. Mild accentuation of the interstitial markings. No significant airspace consolidation or pleural effusion identified   Recent Labs   Lab 12/27/24  1642 12/28/24  0340 12/28/24  0810   TROPONINIHS 5586* [id number]* [id number]*   - ASA administered with EMS  - Continue ASA, statin, plavix, BB  - Cardiac monitoring  - PRN EKG and sl nitro for chest pain  - K>4, Mg>2  - Previous cardiac testing with multi vessel CAD treated medically with ACS   - ACS protocol with heparin gtts initiated  - Cardiology and interventional cardiology consulted and report the patient's known CAD is not amenable to PCI or CABG & recommend increasing ranolazine and adding imdur to daily regimen.   - Continue to  trend troponin to peak     Type 2 diabetes mellitus with chronic kidney disease on chronic dialysis, without long-term current use of insulin  Patient's FSGs are controlled on current medication regimen.  Last A1c reviewed-   Lab Results   Component Value Date    HGBA1C 4.3 (L) 10/11/2024     Most recent fingerstick glucose reviewed-   Recent Labs   Lab 12/27/24  1747 12/27/24  2105 12/28/24  0726 12/28/24  1121   POCTGLUCOSE 149* 219* 146* 148*     Current correctional scale  Low  Maintain anti-hyperglycemic dose as follows-   Antihyperglycemics (From admission, onward)      Start     Stop Route Frequency Ordered    12/27/24 0844  insulin aspart U-100 pen 0-5 Units         -- SubQ Before meals & nightly PRN 12/27/24 0745        - Hold oral hypoglycemics while patient is in the hospital.    Hypertensive emergency  Patient has a current diagnosis of Hypertensive emergency with end organ damage evidenced by acute coronary syndrome which is uncontrolled.  Latest blood pressure and vitals reviewed-   Temp:  [97 °F (36.1 °C)-98.5 °F (36.9 °C)]   Pulse:  [69-85]   Resp:  [16-20]   BP: ()/(53-77)   SpO2:  [95 %-100 %] .   Patient currently off IV antihypertensives.   Home meds for hypertension were reviewed and noted below.   Hypertension Medications               carvediloL (COREG) 25 MG tablet Take 2 tablets (50 mg total) by mouth 2 (two) times daily with meals.    hydrALAZINE (APRESOLINE) 100 MG tablet Take 1 tablet (100 mg total) by mouth every 8 (eight) hours.    isosorbide mononitrate (IMDUR) 120 MG 24 hr tablet Take 1 tablet (120 mg total) by mouth once daily.    minoxidiL (LONITEN) 10 MG Tab Take 1 tablet (10 mg total) by mouth 2 (two) times daily.    NIFEdipine (PROCARDIA-XL) 90 MG (OSM) 24 hr tablet Take 1 tablet (90 mg total) by mouth once daily.    nitroGLYCERIN (NITROSTAT) 0.4 MG SL tablet Place 1 tablet (0.4 mg total) under the tongue every 5 (five) minutes as needed for Chest pain.     Medication  adjustment for hospital antihypertensives is as follows- see NSTEMI    Will aim for controlled BP reduction by medications noted above. Monitor and mitigate end organ damage as indicated.    Chronic diastolic heart failure  Patient is identified as having Diastolic (HFpEF) heart failure that is Chronic. CHF is currently controlled.   Latest ECHO performed and demonstrates- Results for orders placed during the hospital encounter of 12/27/24    Echo    Interpretation Summary    Left Ventricle: The left ventricle is normal in size. Increased ventricular mass. Moderately increased wall thickness. There is moderate concentric hypertrophy. Regional wall motion abnormalities present. There is low normal systolic function with a visually estimated ejection fraction of 50 - 55%. Grade II diastolic dysfunction.    Right Ventricle: Normal right ventricular cavity size. Wall thickness is normal. Systolic function is normal.    The left atrium is severely dilated.    Aortic Valve: The aortic valve is a trileaflet valve. There is moderate aortic valve sclerosis. Mildly restricted motion.    Mitral Valve: There is moderate posterior mitral annular calcification present. There is mild regurgitation.    Pulmonary Artery: The estimated pulmonary artery systolic pressure is >25 mmHg.    IVC/SVC: The IVC was not visualized.    Pericardium: There is a small anterior effusion.    - Continue Beta Blocker and monitor clinical status closely.   - Monitor on telemetry.  - Patient is off CHF pathway.   - Monitor strict Is&Os and daily weights. Place on fluid restriction of 1.5 L.   - Continue to stress to patient importance of self efficacy and  on diet for CHF.  - Last BNP reviewed- and noted below   Recent Labs   Lab 12/27/24  0356   BNP 1,322*   - Now appears euvolemic after HD on 12/27     Anemia in end-stage renal disease  Anemia is likely due to chronic disease due to ESRD. Most recent hemoglobin and hematocrit are listed  below.  Recent Labs     12/27/24  0356 12/27/24  1909 12/28/24  0340   HGB 10.5* 11.0* 10.7*   HCT 32.9* 34.4* 32.7*     Plan  - Monitor serial CBC: Daily  - Transfuse PRBC if patient becomes hemodynamically unstable, symptomatic or H/H drops below 7/21.  - Patient has not received any PRBC transfusions to date  - Patient's anemia is currently stable    ESRD on hemodialysis  Creatine stable for now. BMP reviewed- noted Estimated Creatinine Clearance: 9.2 mL/min (A) (based on SCr of 9.3 mg/dL (H)). according to latest data. Based on current GFR, CKD stage is end stage.  Monitor UOP and serial BMP and adjust therapy as needed. Renally dose meds. Avoid nephrotoxic medications and procedures.  - Nephrology consulted for HD, appreciate recs    Hemiparesis affecting right side as late effect of cerebrovascular accident  - history noted      VTE Risk Mitigation (From admission, onward)           Ordered     heparin 25,000 units in dextrose 5% 250 mL (100 units/mL) infusion LOW INTENSITY nomogram - [facility]  Continuous        Question:  Begin at (units/kg/hr)  Answer:  12    12/28/24 1159     heparin 25,000 units in dextrose 5% (100 units/ml) IV bolus from bag LOW INTENSITY nomogram - [facility]  As needed (PRN)        Question:  Heparin Infusion Adjustment (DO NOT MODIFY ANSWER)  Answer:  \[email or web address]\Images\Pharmacy\HeparinInfusions\heparin LOW INTENSITY nomogram for [facility] [id number]    12/28/24 1159     heparin 25,000 units in dextrose 5% (100 units/ml) IV bolus from bag LOW INTENSITY nomogram - [facility]  As needed (PRN)        Question:  Heparin Infusion Adjustment (DO NOT MODIFY ANSWER)  Answer:  \[email or web address]\Images\[email or web address]\[email or web address]\heparin LOW INTENSITY nomogram for [facility] [id number]    12/28/24 1159     IP VTE HIGH RISK PATIENT  Once         12/27/24 0745     Place sequential compression device  Until discontinued         12/27/24 0745                    Discharge Planning   QUIN: 12/29/2024     Code Status: Full Code    Medical Readiness for Discharge Date:   Discharge Plan A: Home                        Ijeoma Taveras PA-C  Department of Hospital Medicine   [facility] - Observation 11H

## 2024-12-28 NOTE — PROGRESS NOTES
[facility]  Nephrology  Progress Note    Patient Name: Haroldo Dickinson  MRN: [id number]  Admission Date: 12/27/2024  Hospital Length of Stay: 0 days  Attending Provider: Omar Marino MD   Primary Care Physician: Mireya Larose MD  Principal Problem:NSTEMI (non-ST elevated myocardial infarction)    Subjective:     Interval History:   HD completed yesterday with 2 L removed. Post HD weight 69.3 kg (EDW 69.9 kg). Patient not interested in additional volume removal today as requested by Cardiology.     Review of patient's allergies indicates:   Allergen Reactions    No known allergies      Current Facility-Administered Medications   Medication Frequency    acetaminophen tablet 650 mg Q4H PRN    albuterol-ipratropium 2.5 mg-0.5 mg/3 mL nebulizer solution 3 mL Q4H PRN    aluminum-magnesium hydroxide-simethicone 200-200-20 mg/5 mL suspension 30 mL QID PRN    aspirin EC tablet 81 mg Daily    atorvastatin tablet 80 mg Daily    bisacodyL suppository 10 mg Daily PRN    carvediloL tablet 50 mg BID WM    cinacalcet tablet 60 mg Daily with breakfast    cloNIDine tablet 0.1 mg BID    clopidogreL tablet 75 mg Daily    dextrose 50% injection 12.5 g PRN    dextrose 50% injection 25 g PRN    ezetimibe tablet 10 mg Daily    glucagon (human recombinant) injection 1 mg PRN    glucose chewable tablet 16 g PRN    glucose chewable tablet 24 g PRN    heparin 25,000 units in dextrose 5% (100 units/ml) IV bolus from bag LOW INTENSITY nomogram - [facility] PRN    heparin 25,000 units in dextrose 5% (100 units/ml) IV bolus from bag LOW INTENSITY nomogram - [facility] PRN    heparin 25,000 units in dextrose 5% 250 mL (100 units/mL) infusion LOW INTENSITY nomogram - [facility] Continuous    hydrALAZINE tablet 100 mg Q8H    insulin aspart U-100 pen 0-5 Units QID (AC + HS) PRN    isosorbide mononitrate 24 hr tablet 120 mg Daily    melatonin tablet 6 mg Nightly PRN    minoxidiL tablet 10 mg BID    naloxone 0.4 mg/mL injection 0.02 mg PRN    NIFEdipine 24 hr  tablet 90 mg Daily    nitroGLYCERIN SL tablet 0.4 mg Q5 Min PRN    ondansetron injection 4 mg Q8H PRN    oxyCODONE immediate release tablet 5 mg Q6H PRN    oxyCODONE immediate release tablet Tab 10 mg Q6H PRN    polyethylene glycol packet 17 g Daily PRN    prochlorperazine injection Soln 5 mg Q6H PRN    ranolazine 12 hr tablet 1,000 mg Daily    sevelamer carbonate tablet 1,600 mg TID WM    simethicone chewable tablet 80 mg QID PRN    sodium chloride 0.9% flush 10 mL Q8H PRN     Facility-Administered Medications Ordered in Other Encounters   Medication Frequency    lidocaine (PF) 10 mg/ml (1%) injection 10 mg Once       Objective:     Vital Signs (Most Recent):  Temp: 97.9 °F (36.6 °C) (12/28/24 1137)  Pulse: 85 (12/28/24 1137)  Resp: 18 (12/28/24 1137)  BP: 125/61 (12/28/24 1137)  SpO2: 96 % (12/28/24 1137) Vital Signs (24h Range):  Temp:  [97 °F (36.1 °C)-98.5 °F (36.9 °C)] 97.9 °F (36.6 °C)  Pulse:  [66-85] 85  Resp:  [16-20] 18  SpO2:  [95 %-100 %] 96 %  BP: ()/(53-84) 125/61     Weight: 66.2 kg (146 lb) (12/28/24 1020)  Body mass index is 22.87 kg/m².  Body surface area is 1.77 meters squared.    I/O last 3 completed shifts:  In: 720 [P.O.:420; I.V.:300]  Out: 2600 [Other:2600]     Physical Exam  Vitals and nursing note reviewed.   Constitutional:       General: He is not in acute distress.     Appearance: Normal appearance. He is not ill-appearing.   HENT:      Head: Normocephalic and atraumatic.      Right Ear: External ear normal.      Left Ear: External ear normal.   Eyes:      Extraocular Movements: Extraocular movements intact.      Conjunctiva/sclera: Conjunctivae normal.   Cardiovascular:      Rate and Rhythm: Normal rate and regular rhythm.      Pulses: Normal pulses.      Heart sounds: Normal heart sounds.   Pulmonary:      Effort: Pulmonary effort is normal. No respiratory distress.   Abdominal:      General: Abdomen is flat. Bowel sounds are normal.      Tenderness: There is no abdominal  tenderness.   Musculoskeletal:         General: Normal range of motion.      Cervical back: Normal range of motion and neck supple.      Right lower leg: No edema.      Left lower leg: No edema.   Skin:     General: Skin is warm and dry.      Capillary Refill: Capillary refill takes less than 2 seconds.      Coloration: Skin is not jaundiced.   Neurological:      Mental Status: He is alert and oriented to person, place, and time.      Cranial Nerves: No cranial nerve deficit.   Psychiatric:         Mood and Affect: Mood normal.          Significant Labs:  CBC:   Recent Labs   Lab 12/28/24  0340   WBC 10.18   RBC 3.46*   HGB 10.7*   HCT 32.7*      MCV 95   MCH 30.9   MCHC 32.7     CMP:   Recent Labs   Lab 12/28/24  0340   *   CALCIUM 9.1   ALBUMIN 3.2*   PROT 7.2   *   K 4.1   CO2 24   CL 98   BUN 41*   CREATININE 9.3*   ALKPHOS 82   ALT 9*   AST 21   BILITOT 0.9     All labs within the past 24 hours have been reviewed.     Assessment/Plan:     Cardiac/Vascular  * NSTEMI (non-ST elevated myocardial infarction)  - defer to primary    Renal/  ESRD on hemodialysis  49 y.o. Black or  Male ESRD-HD M-W-F presents to ED on 12/27/2024 with chest pain.  Nephrology consulted for inpatient ESRD-HD management    Outpatient HD Information:  -Dialysis modality: Hemodialysis  -[facility] HD [facility]  -Nephrologist: Manny SIMPSON  -HD TX days: Monday/Wednesday/Friday, duration of treatment: 3-4 hrs   -Dialysis access: RUE AV fistula   -Residual urine: Minium   -EDW: 69.9 kg    Assessment:   - Patient not amendable to additional volume removal today. Post HD weight yesterday slightly below his EDW. Does not feel overloaded on exam.   - Continue to monitor intake and output  - Please avoid gadolinium, fleets, phos-based laxatives, NSAIDs  - Dialysis thrice weekly unless more urgent indications arise. Will evaluate RRT requirements Daily.    Anemia of ESRD   Recent Labs   Lab 12/27/24  6441  12/27/24  1909 12/28/24  0340   WBC 10.22 10.03 10.18   HGB 10.5* 11.0* 10.7*   HCT 32.9* 34.4* 32.7*    261 288       Lab Results   Component Value Date    FESATURATED 66 (H) 11/27/2023    FERRITIN 1,531 (H) 11/27/2023       - Goal in ESRD is Hgb of 10-11. Hgb 10.7.  - EPO can be administered and dosed per his OP unit upon discharge.  - if patient is on iron infusions please D/C when active infection, cautiously use EPO when hx of malignancy, high BP (SBP usually > 170mmhg).    Mineral Bone Disease in ESRD   Lab Results   Component Value Date     (H) 12/11/2024    CALCIUM 9.1 12/28/2024    ALBUMIN 3.2 (L) 12/28/2024    PHOS 4.8 (H) 12/28/2024       - F/U PO4, Mg, Calcium. And albumin levels daily.   - Renal diet with protein intake goal 1.5 g/kg/d with 1 L fluid restriction   - If patient has poor oral intake, recommend nephro nutritional shakes (ex Novasource)  - Continue or restart home phos binder, phos 4.8        Thank you for your consult. I will follow-up with patient. Please contact us if you have any additional questions.    Avis Mancilla, GISSELL, FNP-C  Nephrology  [facility]

## 2024-12-28 NOTE — PLAN OF CARE
No further c/o chest pain, resting well in bed. Heparin infusing as per nomogram. No needs currently. NSR on tele monitor. Monitoring ongoing.    Problem: Adult Inpatient Plan of Care  Goal: Plan of Care Review  Outcome: Progressing     Problem: Hemodialysis  Goal: Absence of Infection Signs and Symptoms  Outcome: Progressing     Problem: Chest Pain  Goal: Resolution of Chest Pain Symptoms  Outcome: Progressing     Problem: Hypertension Acute  Goal: Blood Pressure Within Desired Range  Outcome: Progressing

## 2024-12-28 NOTE — PLAN OF CARE
Inpatient Upgrade Note    Haroldo Dickinson has warranted treatment spanning two or more midnights of hospital level care for the management of chest pain. He continues to require daily labs, further testing/imaging, monitoring of vital signs, medication adjustments, further evaluation by consultants, and heparin gtt . His condition is also complicated by the following comorbidities: Coronary Artery Disease, Hypertension, Diabetes, Chronic kidney disease, Heart failure, and HX renal cell carcinoma.  .

## 2024-12-28 NOTE — ASSESSMENT & PLAN NOTE
Patient's FSGs are controlled on current medication regimen.  Last A1c reviewed-   Lab Results   Component Value Date    HGBA1C 4.3 (L) 10/11/2024     Most recent fingerstick glucose reviewed-   Recent Labs   Lab 12/27/24  1747 12/27/24  2105 12/28/24  0726 12/28/24  1121   POCTGLUCOSE 149* 219* 146* 148*     Current correctional scale  Low  Maintain anti-hyperglycemic dose as follows-   Antihyperglycemics (From admission, onward)      Start     Stop Route Frequency Ordered    12/27/24 0844  insulin aspart U-100 pen 0-5 Units         -- SubQ Before meals & nightly PRN 12/27/24 0745        - Hold oral hypoglycemics while patient is in the hospital.

## 2024-12-28 NOTE — CONSULTS
[facility]  Interventional Cardiology  Consult Note    Patient Name: Haroldo Dickinson  MRN: [id number]  Admission Date: 12/27/2024  Hospital Length of Stay: 0 days  Code Status: Full Code   Attending Provider: Omar Marino MD  Consulting Provider: Axel Pena MD  Primary Care Physician: Mireya Larose MD  Principal Problem:NSTEMI (non-ST elevated myocardial infarction)    Patient information was obtained from patient and ER records.     Inpatient consult to Interventional Cardiology  Consult performed by: Axel Pena MD  Consult ordered by: Ijoema Taveras PA-C        Subjective:     Chief Complaint:  Chest pain     HPI:  49M with ESRD on HD, CAD (HILLARY to mid RCA but now , mid/prox-LAD disease), HFmrEF (40-45%), CVA with RSW, RCC s/p BL nephrectomy, HTN here for chest pain. States he was watching TV when he suddenly experienced onset of dull, pressure-like chest pain on L side of chest which slightly radiated to center chest. HIS SBP on presentation was in 200s and has volume on exam. Underwent dialysis and blood pressures controlled now. Patient denies having any more chest pain today.    Past Medical History:   Diagnosis Date    CAD (coronary artery disease), native coronary artery 11/21/2019    Cataract     Diabetes mellitus     Diabetic retinopathy     Dialysis patient     DM type 2 causing renal disease, not at goal     ESRD (end stage renal disease) started dialysis 01/2014 06/05/2014    History of colon polyps 02/10/2021    History of COVID-19 12/29/2022    Hyperparathyroidism, secondary renal 06/05/2014    Hypertension     NSTEMI (non-ST elevated myocardial infarction) 12/21/2013    Organ transplant candidate 06/05/2014    Palliative care encounter 10/29/2024    Pleural effusion 06/07/2024    Pneumonia     Post PTCA 08/26/2020    RCA HILLARY 7-25-20    Renal cell carcinoma of right kidney     Renal hypertension     Stroke        Past Surgical History:   Procedure Laterality Date     ANGIOGRAM, CORONARY, WITH LEFT HEART CATHETERIZATION N/A 7/1/2021    Procedure: Angiogram, Coronary, with Left Heart Cath;  Surgeon: Miki Zendejas MD;  Location: [facility] CATH LAB;  Service: Cardiology;  Laterality: N/A;    ANGIOGRAM, CORONARY, WITH LEFT HEART CATHETERIZATION N/A 11/28/2023    Procedure: Angiogram, Coronary, with Left Heart Cath;  Surgeon: Noah Pendleton MD;  Location: [facility] CATH LAB;  Service: Cardiology;  Laterality: N/A;    COLONOSCOPY N/A 5/3/2017    Procedure: COLONOSCOPY;  Surgeon: Rufino Carpenter MD;  Location: [facility] ENDO (4TH FLR);  Service: Endoscopy;  Laterality: N/A;  pt states can only schedule on Wednesdays    COLONOSCOPY N/A 2/9/2021    Procedure: COLONOSCOPY;  Surgeon: Edil Wong MD;  Location: [facility] ENDO (4TH FLR);  Service: Endoscopy;  Laterality: N/A;  Dialysis MWF/ labwork day of procedure  right arm aceess  per Dr. Colin pt can hold Plavix 5 days prior see note- sm  COVID test on 2/6/21 at [facility] -     COLONOSCOPY N/A 2/10/2021    Procedure: COLONOSCOPY;  Surgeon: Robert Ayers MD;  Location: [facility] (4TH FLR);  Service: Endoscopy;  Laterality: N/A;  rescheduled due to poor bowel prep-BB  negative covid screening 2/6/21-BB  dialysis M-W-F-BB  okay to r/s for 2/9/21 and to hold Plavix per Dr. KIRAN Wong-BB  labs same day-BB    CORONARY STENT PLACEMENT N/A 7/25/2019    Procedure: INSERTION, STENT, CORONARY ARTERY;  Surgeon: Miki Zendejas MD;  Location: [facility] CATH LAB;  Service: Cardiology;  Laterality: N/A;    DIALYSIS FISTULA CREATION      FISTULOGRAM N/A 2/18/2019    Procedure: Fistulogram;  Surgeon: Yaneth De La Cruz MD;  Location: [facility] CATH LAB;  Service: Cardiology;  Laterality: N/A;    FISTULOGRAM Right 7/23/2019    Procedure: Fistulogram;  Surgeon: Oz Cordoba MD;  Location: [facility] CATH LAB;  Service: Cardiology;  Laterality: Right;    LAPAROSCOPIC ROBOT-ASSISTED SURGICAL REMOVAL OF KIDNEY USING DA JAIME XI Right 5/19/2022    Procedure: XI ROBOTIC  NEPHRECTOMY;  Surgeon: Aidan Hendricks MD;  [address]  Service: Urology;  Laterality: Right;  3hrs    LAPAROSCOPIC ROBOT-ASSISTED SURGICAL REMOVAL OF KIDNEY USING DA JAIME XI Left 1/5/2023    Procedure: XI ROBOTIC NEPHRECTOMY;  Surgeon: Aidan Hendricks MD;  [facility];  Service: Urology;  Laterality: Left;  4 hrs    LEFT HEART CATHETERIZATION Left 7/25/2019    Procedure: Left heart cath;  Surgeon: Miki Zendejas MD;  Location: [facility] CATH LAB;  Service: Cardiology;  Laterality: Left;    REPAIR  1/5/2023    Procedure: REPAIR; COLOTOMY;  Surgeon: Maikel Lamb MD;  Location: [facility] OR [address] [place] [address];  Service: General;;    RETINAL LASER PROCEDURE Bilateral 2018 or 2017    Dr. Rothman    VASCULAR SURGERY         Review of patient's allergies indicates:   Allergen Reactions    No known allergies        PTA Medications   Medication Sig    aspirin (ECOTRIN) 81 MG EC tablet Take 1 tablet (81 mg total) by mouth once daily.    atorvastatin (LIPITOR) 80 MG tablet Take 1 tablet (80 mg total) by mouth once daily.    carvediloL (COREG) 25 MG tablet Take 2 tablets (50 mg total) by mouth 2 (two) times daily with meals.    ciclopirox (PENLAC) 8 % Soln Apply topically nightly.    clopidogreL (PLAVIX) 75 mg tablet Take 1 tablet (75 mg total) by mouth once daily.    epoetin philip (PROCRIT) 4,000 unit/mL injection Inject 1.93 mLs (7,720 Units total) into the skin every Mon, Wed, Fri.    ezetimibe (ZETIA) 10 mg tablet Take 1 tablet (10 mg total) by mouth once daily.    hydrALAZINE (APRESOLINE) 100 MG tablet Take 1 tablet (100 mg total) by mouth every 8 (eight) hours.    isosorbide mononitrate (IMDUR) 120 MG 24 hr tablet Take 1 tablet (120 mg total) by mouth once daily.    methoxy peg-epoetin beta (MIRCERA INJ) 150 mcg.    NIFEdipine (PROCARDIA-XL) 90 MG (OSM) 24 hr tablet Take 1 tablet (90 mg total) by mouth once daily.    nitroGLYCERIN (NITROSTAT) 0.4 MG SL tablet Place 1 tablet (0.4 mg total) under  the tongue every 5 (five) minutes as needed for Chest pain.    polyethylene glycol (GLYCOLAX) 17 gram/dose powder Take 17 g by mouth daily as needed for Constipation.    ranolazine (RANEXA) 500 MG Tb12 Take 1 tablet (500 mg total) by mouth Daily.    sevelamer carbonate (RENVELA) 800 mg Tab Take 2 tablets (1,600 mg total) by mouth 3 (three) times daily with meals.    vit B complex-C-folic ac-zinc (RENAPLEX) 800 mcg- 12.5 mg Tab Take 1 tablet by mouth.     Family History       Problem Relation (Age of Onset)    Cancer Maternal Grandfather    Cataracts Mother    Diabetes Mother    Heart disease Brother    Hypertension Mother, Father, Sister, Brother    Kidney disease Brother          Tobacco Use    Smoking status: Never    Smokeless tobacco: Never   Substance and Sexual Activity    Alcohol use: Not Currently     Comment: One drink a month    Drug use: No    Sexual activity: Yes     Partners: Female     Birth control/protection: None     Review of Systems   Constitutional: Negative.   HENT: Negative.     Eyes: Negative.    Cardiovascular:  Positive for chest pain.   Respiratory: Negative.     Endocrine: Negative.    Hematologic/Lymphatic: Negative.    Skin: Negative.    Musculoskeletal: Negative.      Objective:     Vital Signs (Most Recent):  Temp: 98.4 °F (36.9 °C) (12/27/24 1917)  Pulse: 71 (12/27/24 1917)  Resp: 20 (12/27/24 1917)  BP: 130/72 (12/27/24 1917)  SpO2: 100 % (12/27/24 1917) Vital Signs (24h Range):  Temp:  [97 °F (36.1 °C)-98.4 °F (36.9 °C)] 98.4 °F (36.9 °C)  Pulse:  [66-80] 71  Resp:  [14-20] 20  SpO2:  [95 %-100 %] 100 %  BP: (120-211)/() 130/72     Weight: 77.1 kg (170 lb)  Body mass index is 26.63 kg/m².    SpO2: 100 %         Intake/Output Summary (Last 24 hours) at 12/27/2024 2100  Last data filed at 12/27/2024 1733  Gross per 24 hour   Intake 720 ml   Output 2600 ml   Net -1880 ml       Lines/Drains/Airways       Peripheral Intravenous Line  Duration                  Hemodialysis AV  Fistula Right forearm -- days         Peripheral IV - Single Lumen 12/27/24 0235 18 G Anterior;Left Forearm <1 day                     Physical Exam  Constitutional:       Appearance: Normal appearance.   HENT:      Head: Normocephalic and atraumatic.   Cardiovascular:      Rate and Rhythm: Normal rate and regular rhythm.   Pulmonary:      Effort: Pulmonary effort is normal.      Breath sounds: Normal breath sounds.   Musculoskeletal:         General: Normal range of motion.      Cervical back: Normal range of motion.   Skin:     General: Skin is warm.      Capillary Refill: Capillary refill takes less than 2 seconds.   Neurological:      Mental Status: He is alert.            Significant Labs: All pertinent lab results from the last 24 hours have been reviewed.      Assessment and Plan:     Cardiac/Vascular  Demand ischemia  Know diffusely LAD disease with RCA  not amenable to PCI or CABG and has scar tissue in LAD and RCA territory on PET stress.  Elevated troponin 2/2 HTN emergency and volume overload     Plan   Recommend optimizing meds for Hypertension and agree with adding Imdur to his regimen.   Can discontinue Heparin   May be need to readdress his dry weight and require more dialysis  to remove volume.       Axel Pena  Interventional Cardiology             VTE Risk Mitigation (From admission, onward)           Ordered     heparin 25,000 units in dextrose 5% (100 units/ml) IV bolus from bag LOW INTENSITY nomogram - [facility]  As needed (PRN)        Question:  Heparin Infusion Adjustment (DO NOT MODIFY ANSWER)  Answer:  \\[email or web address]\Images\Pharmacy\HeparinInfusions\heparin LOW INTENSITY nomogram for [facility] [id number].[email or web address]    12/27/24 1809     heparin 25,000 units in dextrose 5% (100 units/ml) IV bolus from bag LOW INTENSITY nomogram - [facility]  As needed (PRN)        Question:  Heparin Infusion Adjustment (DO NOT MODIFY ANSWER)  Answer:  [email or web address]\Images\Pharmacy\HeparinInfusions\heparin LOW INTENSITY  nomogram for [facility] [id number].pdf    12/27/24 1809     heparin 25,000 units in dextrose 5% 250 mL (100 units/mL) infusion LOW INTENSITY nomogram - [facility]  Continuous        Question:  Begin at (units/kg/hr)  Answer:  12 12/27/24 1809     IP VTE HIGH RISK PATIENT  Once         12/27/24 0745     Place sequential compression device  Until discontinued         12/27/24 0745                    Thank you for your consult. I will sign off. Please contact us if you have any additional questions.    Axel Pena MD  Interventional Cardiology   [facility]

## 2024-12-28 NOTE — ASSESSMENT & PLAN NOTE
Patient has a current diagnosis of Hypertensive emergency with end organ damage evidenced by acute coronary syndrome which is uncontrolled.  Latest blood pressure and vitals reviewed-   Temp:  [97 °F (36.1 °C)-98.5 °F (36.9 °C)]   Pulse:  [69-85]   Resp:  [16-20]   BP: ()/(53-77)   SpO2:  [95 %-100 %] .   Patient currently off IV antihypertensives.   Home meds for hypertension were reviewed and noted below.   Hypertension Medications               carvediloL (COREG) 25 MG tablet Take 2 tablets (50 mg total) by mouth 2 (two) times daily with meals.    hydrALAZINE (APRESOLINE) 100 MG tablet Take 1 tablet (100 mg total) by mouth every 8 (eight) hours.    isosorbide mononitrate (IMDUR) 120 MG 24 hr tablet Take 1 tablet (120 mg total) by mouth once daily.    minoxidiL (LONITEN) 10 MG Tab Take 1 tablet (10 mg total) by mouth 2 (two) times daily.    NIFEdipine (PROCARDIA-XL) 90 MG (OSM) 24 hr tablet Take 1 tablet (90 mg total) by mouth once daily.    nitroGLYCERIN (NITROSTAT) 0.4 MG SL tablet Place 1 tablet (0.4 mg total) under the tongue every 5 (five) minutes as needed for Chest pain.     Medication adjustment for hospital antihypertensives is as follows- see NSTEMI    Will aim for controlled BP reduction by medications noted above. Monitor and mitigate end organ damage as indicated.

## 2024-12-28 NOTE — ASSESSMENT & PLAN NOTE
Triple vessel CAD  Demand ischemia   - Acute, patient reports chest pain is ongoing and intermittent - none at present  - EKG with nonspecific ST changes  - CXR with heart size upper limit of normal or mildly enlarged. Mild accentuation of the interstitial markings. No significant airspace consolidation or pleural effusion identified   Recent Labs   Lab 12/27/24  1642 12/28/24  0340 12/28/24  0810   TROPONINIHS 5586* 9576* [id number]*   - ASA administered with EMS  - Continue ASA, statin, plavix, BB  - Cardiac monitoring  - PRN EKG and sl nitro for chest pain  - K>4, Mg>2  - Previous cardiac testing with multi vessel CAD treated medically with ACS   - ACS protocol with heparin gtts initiated  - Cardiology and interventional cardiology consulted and report the patient's known CAD is not amenable to PCI or CABG & recommend increasing ranolazine and adding imdur to daily regimen.   - Continue to trend troponin to peak

## 2024-12-28 NOTE — ASSESSMENT & PLAN NOTE
49 y.o. Black or  Male ESRD-HD M-W-F presents to ED on 12/27/2024 with chest pain.  Nephrology consulted for inpatient ESRD-HD management    Outpatient HD Information:  -Dialysis modality: Hemodialysis  -Outpatient HD unit: [facility]  -Nephrologist: Manny SIMPSON  -HD TX days: Monday/Wednesday/Friday, duration of treatment: 3-4 hrs   -Dialysis access: RUE AV fistula   -Residual urine: Minium   -EDW: 69.9 kg    Assessment:   - Patient not amendable to additional volume removal today. Post HD weight yesterday slightly below his EDW. Does not feel overloaded on exam.   - Continue to monitor intake and output  - Please avoid gadolinium, fleets, phos-based laxatives, NSAIDs  - Dialysis thrice weekly unless more urgent indications arise. Will evaluate RRT requirements Daily.    Anemia of ESRD   Recent Labs   Lab 12/27/24  0356 12/27/24  1909 12/28/24  0340   WBC 10.22 10.03 10.18   HGB 10.5* 11.0* 10.7*   HCT 32.9* 34.4* 32.7*    261 288       Lab Results   Component Value Date    FESATURATED 66 (H) 11/27/2023    FERRITIN 1,531 (H) 11/27/2023       - Goal in ESRD is Hgb of 10-11. Hgb 10.7.  - EPO can be administered and dosed per his OP unit upon discharge.  - if patient is on iron infusions please D/C when active infection, cautiously use EPO when hx of malignancy, high BP (SBP usually > 170mmhg).    Mineral Bone Disease in ESRD   Lab Results   Component Value Date     (H) 12/11/2024    CALCIUM 9.1 12/28/2024    ALBUMIN 3.2 (L) 12/28/2024    PHOS 4.8 (H) 12/28/2024       - F/U PO4, Mg, Calcium. And albumin levels daily.   - Renal diet with protein intake goal 1.5 g/kg/d with 1 L fluid restriction   - If patient has poor oral intake, recommend nephro nutritional shakes (ex Novasource)  - Continue or restart home phos binder, phos 4.8

## 2024-12-28 NOTE — SUBJECTIVE & OBJECTIVE
Interval History:   HD completed yesterday with 2 L removed. Post HD weight 69.3 kg (EDW 69.9 kg). Patient not interested in additional volume removal today as requested by Cardiology.     Review of patient's allergies indicates:   Allergen Reactions    No known allergies      Current Facility-Administered Medications   Medication Frequency    acetaminophen tablet 650 mg Q4H PRN    albuterol-ipratropium 2.5 mg-0.5 mg/3 mL nebulizer solution 3 mL Q4H PRN    aluminum-magnesium hydroxide-simethicone 200-200-20 mg/5 mL suspension 30 mL QID PRN    aspirin EC tablet 81 mg Daily    atorvastatin tablet 80 mg Daily    bisacodyL suppository 10 mg Daily PRN    carvediloL tablet 50 mg BID WM    cinacalcet tablet 60 mg Daily with breakfast    cloNIDine tablet 0.1 mg BID    clopidogreL tablet 75 mg Daily    dextrose 50% injection 12.5 g PRN    dextrose 50% injection 25 g PRN    ezetimibe tablet 10 mg Daily    glucagon (human recombinant) injection 1 mg PRN    glucose chewable tablet 16 g PRN    glucose chewable tablet 24 g PRN    heparin 25,000 units in dextrose 5% (100 units/ml) IV bolus from bag LOW INTENSITY nomogram - [facility] PRN    heparin 25,000 units in dextrose 5% (100 units/ml) IV bolus from bag LOW INTENSITY nomogram - [facility] PRN    heparin 25,000 units in dextrose 5% 250 mL (100 units/mL) infusion LOW INTENSITY nomogram - [facility] Continuous    hydrALAZINE tablet 100 mg Q8H    insulin aspart U-100 pen 0-5 Units QID (AC + HS) PRN    isosorbide mononitrate 24 hr tablet 120 mg Daily    melatonin tablet 6 mg Nightly PRN    minoxidiL tablet 10 mg BID    naloxone 0.4 mg/mL injection 0.02 mg PRN    NIFEdipine 24 hr tablet 90 mg Daily    nitroGLYCERIN SL tablet 0.4 mg Q5 Min PRN    ondansetron injection 4 mg Q8H PRN    oxyCODONE immediate release tablet 5 mg Q6H PRN    oxyCODONE immediate release tablet Tab 10 mg Q6H PRN    polyethylene glycol packet 17 g Daily PRN    prochlorperazine injection Soln 5 mg Q6H PRN    ranolazine 12 hr tablet  1,000 mg Daily    sevelamer carbonate tablet 1,600 mg TID WM    simethicone chewable tablet 80 mg QID PRN    sodium chloride 0.9% flush 10 mL Q8H PRN     Facility-Administered Medications Ordered in Other Encounters   Medication Frequency    lidocaine (PF) 10 mg/ml (1%) injection 10 mg Once       Objective:     Vital Signs (Most Recent):  Temp: 97.9 °F (36.6 °C) (12/28/24 1137)  Pulse: 85 (12/28/24 1137)  Resp: 18 (12/28/24 1137)  BP: 125/61 (12/28/24 1137)  SpO2: 96 % (12/28/24 1137) Vital Signs (24h Range):  Temp:  [97 °F (36.1 °C)-98.5 °F (36.9 °C)] 97.9 °F (36.6 °C)  Pulse:  [66-85] 85  Resp:  [16-20] 18  SpO2:  [95 %-100 %] 96 %  BP: ()/(53-84) 125/61     Weight: 66.2 kg (146 lb) (12/28/24 1020)  Body mass index is 22.87 kg/m².  Body surface area is 1.77 meters squared.    I/O last 3 completed shifts:  In: 720 [P.O.:420; I.V.:300]  Out: 2600 [Other:2600]     Physical Exam  Vitals and nursing note reviewed.   Constitutional:       General: He is not in acute distress.     Appearance: Normal appearance. He is not ill-appearing.   HENT:      Head: Normocephalic and atraumatic.      Right Ear: External ear normal.      Left Ear: External ear normal.   Eyes:      Extraocular Movements: Extraocular movements intact.      Conjunctiva/sclera: Conjunctivae normal.   Cardiovascular:      Rate and Rhythm: Normal rate and regular rhythm.      Pulses: Normal pulses.      Heart sounds: Normal heart sounds.   Pulmonary:      Effort: Pulmonary effort is normal. No respiratory distress.   Abdominal:      General: Abdomen is flat. Bowel sounds are normal.      Tenderness: There is no abdominal tenderness.   Musculoskeletal:         General: Normal range of motion.      Cervical back: Normal range of motion and neck supple.      Right lower leg: No edema.      Left lower leg: No edema.   Skin:     General: Skin is warm and dry.      Capillary Refill: Capillary refill takes less than 2 seconds.      Coloration: Skin is not  jaundiced.   Neurological:      Mental Status: He is alert and oriented to person, place, and time.      Cranial Nerves: No cranial nerve deficit.   Psychiatric:         Mood and Affect: Mood normal.          Significant Labs:  CBC:   Recent Labs   Lab 12/28/24  0340   WBC 10.18   RBC 3.46*   HGB 10.7*   HCT 32.7*      MCV 95   MCH 30.9   MCHC 32.7     CMP:   Recent Labs   Lab 12/28/24  0340   *   CALCIUM 9.1   ALBUMIN 3.2*   PROT 7.2   *   K 4.1   CO2 24   CL 98   BUN 41*   CREATININE 9.3*   ALKPHOS 82   ALT 9*   AST 21   BILITOT 0.9     All labs within the past 24 hours have been reviewed.

## 2024-12-28 NOTE — ASSESSMENT & PLAN NOTE
Know diffusely LAD disease with RCA  not amenable to PCI or CABG and has scar tissue in LAD and RCA territory on PET stress.  Elevated troponin 2/2 HTN emergency and volume overload     Plan   Recommend optimizing meds for Hypertension and agree with adding Imdur to his regimen.   May be need to readdress his dry weight and require more dialysis

## 2024-12-28 NOTE — ASSESSMENT & PLAN NOTE
Creatine stable for now. BMP reviewed- noted Estimated Creatinine Clearance: 9.2 mL/min (A) (based on SCr of 9.3 mg/dL (H)). according to latest data. Based on current GFR, CKD stage is end stage.  Monitor UOP and serial BMP and adjust therapy as needed. Renally dose meds. Avoid nephrotoxic medications and procedures.  - Nephrology consulted for HD, appreciate recs

## 2024-12-28 NOTE — SUBJECTIVE & OBJECTIVE
Interval History: Pt seen and examined by me this morning. Hypotensive on increased BP regimen recommended by cardiology o/w VSSAF. Holding parameters placed for nifedipine and hydralazine. Pt reports chest pain resolved with initiation of heparin gtts overnight. Troponin continues to rise as of now, but timing between draws was spaced given patient request and lack of ongoing chest pain.     Review of Systems   Constitutional:  Negative for chills and fever.   Respiratory:  Positive for shortness of breath. Negative for chest tightness.    Cardiovascular:  Positive for chest pain. Negative for leg swelling.   Gastrointestinal:  Negative for abdominal pain and nausea.   Neurological:  Negative for dizziness and weakness.     Objective:     Vital Signs (Most Recent):  Temp: 97.9 °F (36.6 °C) (12/28/24 1137)  Pulse: 84 (12/28/24 1401)  Resp: 18 (12/28/24 1137)  BP: (!) 96/55 (12/28/24 1401)  SpO2: 100 % (12/28/24 1401) Vital Signs (24h Range):  Temp:  [97 °F (36.1 °C)-98.5 °F (36.9 °C)] 97.9 °F (36.6 °C)  Pulse:  [69-85] 84  Resp:  [16-20] 18  SpO2:  [95 %-100 %] 100 %  BP: ()/(53-77) 96/55     Weight: 70.1 kg (154 lb 8.7 oz)  Body mass index is 24.2 kg/m².    Intake/Output Summary (Last 24 hours) at 12/28/2024 1442  Last data filed at 12/27/2024 1733  Gross per 24 hour   Intake 300 ml   Output 2600 ml   Net -2300 ml         Physical Exam  Vitals and nursing note reviewed.   Constitutional:       General: He is not in acute distress.     Appearance: He is well-developed. He is ill-appearing (chronically). He is not toxic-appearing.   HENT:      Mouth/Throat:      Mouth: Mucous membranes are moist.   Eyes:      Extraocular Movements: Extraocular movements intact.      Conjunctiva/sclera: Conjunctivae normal.   Cardiovascular:      Rate and Rhythm: Normal rate and regular rhythm.   Pulmonary:      Effort: Pulmonary effort is normal.      Breath sounds: Normal breath sounds. No wheezing or rales.   Abdominal:       Palpations: Abdomen is soft.      Tenderness: There is no abdominal tenderness.   Musculoskeletal:         General: No tenderness.      Right lower leg: No edema.      Left lower leg: No edema.   Skin:     General: Skin is warm and dry.   Neurological:      Mental Status: He is alert and oriented to person, place, and time.   Psychiatric:         Behavior: Behavior normal.             Significant Labs: All pertinent labs within the past 24 hours have been reviewed.  Troponin:   Recent Labs   Lab 12/27/24  1642 12/28/24  0340 12/28/24  0810   TROPONINIHS [id number]* [id number]* [id number]*       Significant Imaging: I have reviewed all pertinent imaging results/findings within the past 24 hours.

## 2024-12-29 LAB
ALBUMIN SERPL BCP-MCNC: 3.1 G/DL (ref 3.5–5.2)
ALP SERPL-CCNC: 76 U/L (ref 40–150)
ALT SERPL W/O P-5'-P-CCNC: 7 U/L (ref 10–44)
ANION GAP SERPL CALC-SCNC: 14 MMOL/L (ref 8–16)
APTT PPP: 35.4 SEC (ref 21–32)
APTT PPP: 38.1 SEC (ref 21–32)
APTT PPP: 42.8 SEC (ref 21–32)
AST SERPL-CCNC: 18 U/L (ref 10–40)
BASOPHILS # BLD AUTO: 0.07 K/UL (ref 0–0.2)
BASOPHILS NFR BLD: 0.6 % (ref 0–1.9)
BILIRUB SERPL-MCNC: 0.6 MG/DL (ref 0.1–1)
BUN SERPL-MCNC: 61 MG/DL (ref 6–20)
CALCIUM SERPL-MCNC: 8.6 MG/DL (ref 8.7–10.5)
CHLORIDE SERPL-SCNC: 95 MMOL/L (ref 95–110)
CO2 SERPL-SCNC: 21 MMOL/L (ref 23–29)
CREAT SERPL-MCNC: 11.1 MG/DL (ref 0.5–1.4)
DIFFERENTIAL METHOD BLD: ABNORMAL
EOSINOPHIL # BLD AUTO: 0.5 K/UL (ref 0–0.5)
EOSINOPHIL NFR BLD: 4.8 % (ref 0–8)
ERYTHROCYTE [DISTWIDTH] IN BLOOD BY AUTOMATED COUNT: 15.9 % (ref 11.5–14.5)
EST. GFR  (NO RACE VARIABLE): 5.2 ML/MIN/1.73 M^2
GLUCOSE SERPL-MCNC: 126 MG/DL (ref 70–110)
HCT VFR BLD AUTO: 31.3 % (ref 40–54)
HGB BLD-MCNC: 10.4 G/DL (ref 14–18)
IMM GRANULOCYTES # BLD AUTO: 0.04 K/UL (ref 0–0.04)
IMM GRANULOCYTES NFR BLD AUTO: 0.4 % (ref 0–0.5)
LYMPHOCYTES # BLD AUTO: 1.7 K/UL (ref 1–4.8)
LYMPHOCYTES NFR BLD: 15 % (ref 18–48)
MAGNESIUM SERPL-MCNC: 1.9 MG/DL (ref 1.6–2.6)
MCH RBC QN AUTO: 32 PG (ref 27–31)
MCHC RBC AUTO-ENTMCNC: 33.2 G/DL (ref 32–36)
MCV RBC AUTO: 96 FL (ref 82–98)
MONOCYTES # BLD AUTO: 1.4 K/UL (ref 0.3–1)
MONOCYTES NFR BLD: 12 % (ref 4–15)
NEUTROPHILS # BLD AUTO: 7.6 K/UL (ref 1.8–7.7)
NEUTROPHILS NFR BLD: 67.2 % (ref 38–73)
NRBC BLD-RTO: 0 /100 WBC
OHS QRS DURATION: 104 MS
OHS QTC CALCULATION: 495 MS
PHOSPHATE SERPL-MCNC: 4.9 MG/DL (ref 2.7–4.5)
PLATELET # BLD AUTO: 305 K/UL (ref 150–450)
PMV BLD AUTO: 10 FL (ref 9.2–12.9)
POCT GLUCOSE: 132 MG/DL (ref 70–110)
POCT GLUCOSE: 143 MG/DL (ref 70–110)
POCT GLUCOSE: 155 MG/DL (ref 70–110)
POCT GLUCOSE: 157 MG/DL (ref 70–110)
POTASSIUM SERPL-SCNC: 4.2 MMOL/L (ref 3.5–5.1)
PROT SERPL-MCNC: 6.9 G/DL (ref 6–8.4)
RBC # BLD AUTO: 3.25 M/UL (ref 4.6–6.2)
SODIUM SERPL-SCNC: 130 MMOL/L (ref 136–145)
TROPONIN I SERPL DL<=0.01 NG/ML-MCNC: ABNORMAL NG/L (ref 0–35)
WBC # BLD AUTO: 11.29 K/UL (ref 3.9–12.7)

## 2024-12-29 PROCEDURE — 36415 COLL VENOUS BLD VENIPUNCTURE: CPT | Mod: XB | Performed by: STUDENT IN AN ORGANIZED HEALTH CARE EDUCATION/TRAINING PROGRAM

## 2024-12-29 PROCEDURE — 25000003 PHARM REV CODE 250

## 2024-12-29 PROCEDURE — 63600175 PHARM REV CODE 636 W HCPCS: Performed by: STUDENT IN AN ORGANIZED HEALTH CARE EDUCATION/TRAINING PROGRAM

## 2024-12-29 PROCEDURE — 85025 COMPLETE CBC W/AUTO DIFF WBC: CPT | Performed by: PHYSICIAN ASSISTANT

## 2024-12-29 PROCEDURE — 25000242 PHARM REV CODE 250 ALT 637 W/ HCPCS: Performed by: PHYSICIAN ASSISTANT

## 2024-12-29 PROCEDURE — 21400001 HC TELEMETRY ROOM

## 2024-12-29 PROCEDURE — 93010 ELECTROCARDIOGRAM REPORT: CPT | Mod: ,,, | Performed by: INTERNAL MEDICINE

## 2024-12-29 PROCEDURE — 80053 COMPREHEN METABOLIC PANEL: CPT | Performed by: PHYSICIAN ASSISTANT

## 2024-12-29 PROCEDURE — 83735 ASSAY OF MAGNESIUM: CPT | Performed by: PHYSICIAN ASSISTANT

## 2024-12-29 PROCEDURE — 36415 COLL VENOUS BLD VENIPUNCTURE: CPT

## 2024-12-29 PROCEDURE — 63600175 PHARM REV CODE 636 W HCPCS: Performed by: PHYSICIAN ASSISTANT

## 2024-12-29 PROCEDURE — 85730 THROMBOPLASTIN TIME PARTIAL: CPT | Performed by: STUDENT IN AN ORGANIZED HEALTH CARE EDUCATION/TRAINING PROGRAM

## 2024-12-29 PROCEDURE — 84484 ASSAY OF TROPONIN QUANT: CPT | Mod: 91

## 2024-12-29 PROCEDURE — 36415 COLL VENOUS BLD VENIPUNCTURE: CPT | Performed by: PHYSICIAN ASSISTANT

## 2024-12-29 PROCEDURE — 25000003 PHARM REV CODE 250: Performed by: STUDENT IN AN ORGANIZED HEALTH CARE EDUCATION/TRAINING PROGRAM

## 2024-12-29 PROCEDURE — 93005 ELECTROCARDIOGRAM TRACING: CPT

## 2024-12-29 PROCEDURE — 25000003 PHARM REV CODE 250: Performed by: PHYSICIAN ASSISTANT

## 2024-12-29 PROCEDURE — 84100 ASSAY OF PHOSPHORUS: CPT | Performed by: PHYSICIAN ASSISTANT

## 2024-12-29 RX ORDER — MUPIROCIN 20 MG/G
OINTMENT TOPICAL 2 TIMES DAILY
Status: DISCONTINUED | OUTPATIENT
Start: 2024-12-29 | End: 2024-12-30 | Stop reason: HOSPADM

## 2024-12-29 RX ORDER — CARVEDILOL 25 MG/1
25 TABLET ORAL 2 TIMES DAILY WITH MEALS
Status: DISCONTINUED | OUTPATIENT
Start: 2024-12-29 | End: 2024-12-30 | Stop reason: HOSPADM

## 2024-12-29 RX ADMIN — MINOXIDIL 10 MG: 10 TABLET ORAL at 09:12

## 2024-12-29 RX ADMIN — MINOXIDIL 10 MG: 10 TABLET ORAL at 10:12

## 2024-12-29 RX ADMIN — HEPARIN SODIUM AND DEXTROSE 19 UNITS/KG/HR: 10000; 5 INJECTION INTRAVENOUS at 09:12

## 2024-12-29 RX ADMIN — NITROGLYCERIN 0.4 MG: 0.4 TABLET, ORALLY DISINTEGRATING SUBLINGUAL at 07:12

## 2024-12-29 RX ADMIN — SEVELAMER CARBONATE 1600 MG: 800 TABLET, FILM COATED ORAL at 05:12

## 2024-12-29 RX ADMIN — CLOPIDOGREL BISULFATE 75 MG: 75 TABLET ORAL at 09:12

## 2024-12-29 RX ADMIN — SEVELAMER CARBONATE 1600 MG: 800 TABLET, FILM COATED ORAL at 07:12

## 2024-12-29 RX ADMIN — ASPIRIN 81 MG: 81 TABLET, COATED ORAL at 09:12

## 2024-12-29 RX ADMIN — CARVEDILOL 25 MG: 25 TABLET, FILM COATED ORAL at 05:12

## 2024-12-29 RX ADMIN — RANOLAZINE 1000 MG: 500 TABLET, EXTENDED RELEASE ORAL at 05:12

## 2024-12-29 RX ADMIN — ATORVASTATIN CALCIUM 80 MG: 40 TABLET, FILM COATED ORAL at 09:12

## 2024-12-29 RX ADMIN — MUPIROCIN: 20 OINTMENT TOPICAL at 09:12

## 2024-12-29 RX ADMIN — EZETIMIBE 10 MG: 10 TABLET ORAL at 09:12

## 2024-12-29 RX ADMIN — SEVELAMER CARBONATE 1600 MG: 800 TABLET, FILM COATED ORAL at 11:12

## 2024-12-29 RX ADMIN — ISOSORBIDE MONONITRATE 120 MG: 60 TABLET, EXTENDED RELEASE ORAL at 09:12

## 2024-12-29 RX ADMIN — CINACALCET HYDROCHLORIDE 60 MG: 30 TABLET, FILM COATED ORAL at 07:12

## 2024-12-29 NOTE — NURSING
Pt with c/o chest pain upon shift report rounds, rates pain 4/10, described as cramping like pain.  Nitro given x1, vitals obtained, EKG called.  After first nitro pt verbalized complete resolution of pain.  Heparin drip continue to infuse at 17units/kg/hr at 11.9 cc/hr.  Day RN to notify provider.

## 2024-12-29 NOTE — SUBJECTIVE & OBJECTIVE
Interval History: Pt seen and examined by me this morning. VSSAF. Holding parameters placed for nifedipine, hydralazine discontinued due to persistent hypotension. Pt reports an episode of chest pain ~7am this morning, which resolved with one dose of nitroglycerin. Troponin continues to rise as of now, but timing between draws were spaced given patient request.     Review of Systems   Constitutional:  Negative for chills and fever.   Respiratory:  Positive for shortness of breath. Negative for chest tightness.    Cardiovascular:  Positive for chest pain. Negative for leg swelling.   Gastrointestinal:  Negative for abdominal pain and nausea.   Neurological:  Negative for dizziness and weakness.     Objective:     Vital Signs (Most Recent):  Temp: 98.1 °F (36.7 °C) (12/29/24 1117)  Pulse: 86 (12/29/24 1117)  Resp: 17 (12/29/24 1117)  BP: 120/66 (12/29/24 1117)  SpO2: 97 % (12/29/24 1117) Vital Signs (24h Range):  Temp:  [97.6 °F (36.4 °C)-98.1 °F (36.7 °C)] 98.1 °F (36.7 °C)  Pulse:  [79-92] 86  Resp:  [17-20] 17  SpO2:  [95 %-100 %] 97 %  BP: ()/(54-66) 120/66     Weight: 70.1 kg (154 lb 8.7 oz)  Body mass index is 24.2 kg/m².  No intake or output data in the 24 hours ending 12/29/24 1344        Physical Exam  Vitals and nursing note reviewed.   Constitutional:       General: He is not in acute distress.     Appearance: He is well-developed. He is ill-appearing (chronically). He is not toxic-appearing.   HENT:      Mouth/Throat:      Mouth: Mucous membranes are moist.   Eyes:      Extraocular Movements: Extraocular movements intact.      Conjunctiva/sclera: Conjunctivae normal.   Cardiovascular:      Rate and Rhythm: Normal rate and regular rhythm.   Pulmonary:      Effort: Pulmonary effort is normal.      Breath sounds: Normal breath sounds. No wheezing or rales.   Abdominal:      Palpations: Abdomen is soft.      Tenderness: There is no abdominal tenderness.   Musculoskeletal:         General: No tenderness.       Right lower leg: No edema.      Left lower leg: No edema.   Skin:     General: Skin is warm and dry.   Neurological:      Mental Status: He is alert and oriented to person, place, and time.   Psychiatric:         Behavior: Behavior normal.             Significant Labs: All pertinent labs within the past 24 hours have been reviewed.  Troponin:   Recent Labs   Lab 12/28/24  1956 12/28/24  2332 12/29/24  0506   TROPONINIHS [id number]* [id number]* [id number]*       Significant Imaging: I have reviewed all pertinent imaging results/findings within the past 24 hours.

## 2024-12-29 NOTE — PLAN OF CARE
Problem: Adult Inpatient Plan of Care  Goal: Plan of Care Review  Outcome: Progressing     Problem: Hemodialysis  Goal: Effective Tissue Perfusion  Outcome: Progressing     Problem: Hypertension Acute  Goal: Blood Pressure Within Desired Range  Outcome: Progressing     Problem: Chest Pain  Goal: Resolution of Chest Pain Symptoms  Outcome: Progressing

## 2024-12-29 NOTE — ASSESSMENT & PLAN NOTE
Patient's FSGs are controlled on current medication regimen.  Last A1c reviewed-   Lab Results   Component Value Date    HGBA1C 4.3 (L) 10/11/2024     Most recent fingerstick glucose reviewed-   Recent Labs   Lab 12/28/24  1619 12/28/24  2019 12/29/24  0736 12/29/24  1114   POCTGLUCOSE 169* 191* 143* 132*     Current correctional scale  Low  Maintain anti-hyperglycemic dose as follows-   Antihyperglycemics (From admission, onward)      Start     Stop Route Frequency Ordered    12/27/24 0844  insulin aspart U-100 pen 0-5 Units         -- SubQ Before meals & nightly PRN 12/27/24 0745        - Hold oral hypoglycemics while patient is in the hospital.

## 2024-12-29 NOTE — PROGRESS NOTES
[facility] [address] [place] [address] Medicine  Progress Note    Patient Name: Haroldo Dickinson  MRN: [id number]  Patient Class: IP- Inpatient   Admission Date: 12/27/2024  Length of Stay: 1 days  Attending Physician: Omar Marino MD  Primary Care Provider: Mireya Larose MD    Principal Problem:NSTEMI (non-ST elevated myocardial infarction)    HPI:  Haroldo Dickinson is a 49 y.o. male with PMHx significant for mutli vessel CAD, HTN, ESRD on HD MWF, HLD admitted to  for ACS rule out. Patient reports left sided chest pressure that radiates to his substernal area with associated SOB. Reports that these symptoms are typical of his usually chest pain, but states today was more intensified. EMS was called, and patient given ASA and nitro en route with improvement of symptoms. Endorses compliance with medications and HD. Denies fever/chills, diaphoresis, lightheadedness, HA, cough, congestion, abdominal pain, n/v/d, urinary symptoms, changes in BMs, numbness/tingling, weakness. Of note, patient had abnormal PET stress test in October of this year and was ultimately treated with 48 hours of ACS.     In the ED, patient hypertensive to max 208/101. Remaining vitals stable. BNP 1322. HS troponin 240>364. K 4.1. Hgb 10.5. EKG with NSR and nonspecific ST changes. Given home antihypertensive meds (coreg, hydralazine, imdur, nifedipine) with mild improvement of BP. Given morphine and nitro x1 in the ED with temporary improvement of pain. Nephrology consulted for HD.    Overview/Hospital Course:  Mr. Dickinson was admitted to hospital medicine for NSTEMI, thought to be secondary to demand from hypertensive emergency and hypervolemia in the setting of ESRD. EKG with nonspecific ST changes. Pt was dialyzed on 12/27 with significant improvement in BP and volume status. Pt with intermittent and ongoing chest pain and troponin persistently increasing, now up to >15,000. Cardiology and interventional cardiology consulted and report the  patient's known CAD is not amenable to PCI or CABG & recommend increasing ranolazine and adding imdur to daily regimen. ACS protocol with heparin gtts initiated by hospital medicine with plan to continue heparin gtts until troponin peaks.     Interval History: Pt seen and examined by me this morning. VSSAF. Holding parameters placed for nifedipine, hydralazine discontinued due to persistent hypotension. Pt reports an episode of chest pain ~7am this morning, which resolved with one dose of nitroglycerin. Troponin continues to rise as of now, but timing between draws were spaced given patient request.     Review of Systems   Constitutional:  Negative for chills and fever.   Respiratory:  Positive for shortness of breath. Negative for chest tightness.    Cardiovascular:  Positive for chest pain. Negative for leg swelling.   Gastrointestinal:  Negative for abdominal pain and nausea.   Neurological:  Negative for dizziness and weakness.     Objective:     Vital Signs (Most Recent):  Temp: 98.1 °F (36.7 °C) (12/29/24 1117)  Pulse: 86 (12/29/24 1117)  Resp: 17 (12/29/24 1117)  BP: 120/66 (12/29/24 1117)  SpO2: 97 % (12/29/24 1117) Vital Signs (24h Range):  Temp:  [97.6 °F (36.4 °C)-98.1 °F (36.7 °C)] 98.1 °F (36.7 °C)  Pulse:  [79-92] 86  Resp:  [17-20] 17  SpO2:  [95 %-100 %] 97 %  BP: ()/(54-66) 120/66     Weight: 70.1 kg (154 lb 8.7 oz)  Body mass index is 24.2 kg/m².  No intake or output data in the 24 hours ending 12/29/24 1344        Physical Exam  Vitals and nursing note reviewed.   Constitutional:       General: He is not in acute distress.     Appearance: He is well-developed. He is ill-appearing (chronically). He is not toxic-appearing.   HENT:      Mouth/Throat:      Mouth: Mucous membranes are moist.   Eyes:      Extraocular Movements: Extraocular movements intact.      Conjunctiva/sclera: Conjunctivae normal.   Cardiovascular:      Rate and Rhythm: Normal rate and regular rhythm.   Pulmonary:       Effort: Pulmonary effort is normal.      Breath sounds: Normal breath sounds. No wheezing or rales.   Abdominal:      Palpations: Abdomen is soft.      Tenderness: There is no abdominal tenderness.   Musculoskeletal:         General: No tenderness.      Right lower leg: No edema.      Left lower leg: No edema.   Skin:     General: Skin is warm and dry.   Neurological:      Mental Status: He is alert and oriented to person, place, and time.   Psychiatric:         Behavior: Behavior normal.             Significant Labs: All pertinent labs within the past 24 hours have been reviewed.  Troponin:   Recent Labs   Lab 12/28/24 1956 12/28/24 2332 12/29/24  0506   TROPONINIHS [id number]* [id number]* 07347*       Significant Imaging: I have reviewed all pertinent imaging results/findings within the past 24 hours.    Assessment and Plan     * NSTEMI (non-ST elevated myocardial infarction)  Triple vessel CAD  Demand ischemia   - Acute, patient reports chest pain is ongoing and intermittent   - EKG with nonspecific ST changes  - CXR with heart size upper limit of normal or mildly enlarged. Mild accentuation of the interstitial markings. No significant airspace consolidation or pleural effusion identified   Recent Labs   Lab 12/28/24 1956 12/28/24 2332 12/29/24  0506   TROPONINIHS [id number]* [id number]* 55582*   - ASA administered with EMS  - Continue ASA, statin, plavix, BB  - Cardiac monitoring  - PRN EKG and sl nitro for chest pain  - K>4, Mg>2  - Previous cardiac testing with multi vessel CAD treated medically with ACS   - ACS protocol with heparin gtts initiated  - Cardiology and interventional cardiology consulted and report the patient's known CAD is not amenable to PCI or CABG & recommend increasing ranolazine and adding imdur to daily regimen.   - Continue to trend troponin to peak   - CTS consulted, appreciate recs    Type 2 diabetes mellitus with chronic kidney disease on chronic dialysis, without long-term current use of  insulin  Patient's FSGs are controlled on current medication regimen.  Last A1c reviewed-   Lab Results   Component Value Date    HGBA1C 4.3 (L) 10/11/2024     Most recent fingerstick glucose reviewed-   Recent Labs   Lab 12/28/24  1619 12/28/24  2019 12/29/24  0736 12/29/24  1114   POCTGLUCOSE 169* 191* 143* 132*     Current correctional scale  Low  Maintain anti-hyperglycemic dose as follows-   Antihyperglycemics (From admission, onward)      Start     Stop Route Frequency Ordered    12/27/24 0844  insulin aspart U-100 pen 0-5 Units         -- SubQ Before meals & nightly PRN 12/27/24 0745        - Hold oral hypoglycemics while patient is in the hospital.    Hypertensive emergency  Patient has a current diagnosis of Hypertensive emergency with end organ damage evidenced by acute coronary syndrome which is uncontrolled.  Latest blood pressure and vitals reviewed-   Temp:  [97.6 °F (36.4 °C)-98.1 °F (36.7 °C)]   Pulse:  [79-92]   Resp:  [17-20]   BP: ()/(54-66)   SpO2:  [95 %-100 %] .   Patient currently off IV antihypertensives.   Home meds for hypertension were reviewed and noted below.   Hypertension Medications               carvediloL (COREG) 25 MG tablet Take 1 tablet by mouth 2 (two) times daily with meals.    isosorbide mononitrate (IMDUR) 120 MG 24 hr tablet Take 1 tablet (120 mg total) by mouth once daily.    minoxidiL (LONITEN) 10 MG Tab Take 1 tablet (10 mg total) by mouth 2 (two) times daily.    NIFEdipine (PROCARDIA-XL) 90 MG (OSM) 24 hr tablet Take 1 tablet (90 mg total) by mouth once daily.  HOLD if <120    nitroGLYCERIN (NITROSTAT) 0.4 MG SL tablet Place 1 tablet (0.4 mg total) under the tongue every 5 (five) minutes as needed for Chest pain.     Medication adjustment for hospital antihypertensives is as follows- see NSTEMI    Will aim for controlled BP reduction by medications noted above. Monitor and mitigate end organ damage as indicated.    Chronic diastolic heart failure  Patient is  identified as having Diastolic (HFpEF) heart failure that is Chronic. CHF is currently controlled.   Latest ECHO performed and demonstrates- Results for orders placed during the hospital encounter of 12/27/24    Echo    Interpretation Summary    Left Ventricle: The left ventricle is normal in size. Increased ventricular mass. Moderately increased wall thickness. There is moderate concentric hypertrophy. Regional wall motion abnormalities present. There is low normal systolic function with a visually estimated ejection fraction of 50 - 55%. Grade II diastolic dysfunction.    Right Ventricle: Normal right ventricular cavity size. Wall thickness is normal. Systolic function is normal.    The left atrium is severely dilated.    Aortic Valve: The aortic valve is a trileaflet valve. There is moderate aortic valve sclerosis. Mildly restricted motion.    Mitral Valve: There is moderate posterior mitral annular calcification present. There is mild regurgitation.    Pulmonary Artery: The estimated pulmonary artery systolic pressure is >25 mmHg.    IVC/SVC: The IVC was not visualized.    Pericardium: There is a small anterior effusion.    - Continue Beta Blocker and monitor clinical status closely.   - Monitor on telemetry.  - Patient is off CHF pathway.   - Monitor strict Is&Os and daily weights. Place on fluid restriction of 1.5 L.   - Continue to stress to patient importance of self efficacy and  on diet for CHF.  - Last BNP reviewed- and noted below   Recent Labs   Lab 12/27/24  0356   BNP 1,322*     - Now appears euvolemic after HD on 12/27     Anemia in end-stage renal disease  Anemia is likely due to chronic disease due to ESRD. Most recent hemoglobin and hematocrit are listed below.  Recent Labs     12/27/24  1909 12/28/24  0340 12/29/24  0506   HGB 11.0* 10.7* 10.4*   HCT 34.4* 32.7* 31.3*     Plan  - Monitor serial CBC: Daily  - Transfuse PRBC if patient becomes hemodynamically unstable, symptomatic or H/H  drops below 7/21.  - Patient has not received any PRBC transfusions to date  - Patient's anemia is currently stable    ESRD on hemodialysis  Creatine stable for now. BMP reviewed- noted Estimated Creatinine Clearance: 7.7 mL/min (A) (based on SCr of 11.1 mg/dL (H)). according to latest data. Based on current GFR, CKD stage is end stage.  Monitor UOP and serial BMP and adjust therapy as needed. Renally dose meds. Avoid nephrotoxic medications and procedures.  - Nephrology consulted for HD, appreciate recs    Hemiparesis affecting right side as late effect of cerebrovascular accident  - history noted      VTE Risk Mitigation (From admission, onward)           Ordered     heparin 25,000 units in dextrose 5% 250 mL (100 units/mL) infusion LOW INTENSITY nomogram - [facility]  Continuous        Question:  Begin at (units/kg/hr)  Answer:  12    12/28/24 1159     heparin 25,000 units in dextrose 5% (100 units/ml) IV bolus from bag LOW INTENSITY nomogram - [facility]  As needed (PRN)        Question:  Heparin Infusion Adjustment (DO NOT MODIFY ANSWER)  Answer:  [email or web address]\heparin LOW INTENSITY nomogram for [facility] [email or web address]    12/28/24 1159     heparin 25,000 units in dextrose 5% (100 units/ml) IV bolus from bag LOW INTENSITY nomogram - [facility]  As needed (PRN)        Question:  Heparin Infusion Adjustment (DO NOT MODIFY ANSWER)  Answer:  [email or web address]\heparin LOW INTENSITY nomogram for [facility] [id number]    12/28/24 1159     IP VTE HIGH RISK PATIENT  Once         12/27/24 0745     Place sequential compression device  Until discontinued         12/27/24 0745                    Discharge Planning   QUIN: 12/30/2024     Code Status: Full Code   Medical Readiness for Discharge Date:   Discharge Plan A: Home                        Ijeoma Taveras PA-C  Department of Hospital Medicine   [facility]

## 2024-12-29 NOTE — ASSESSMENT & PLAN NOTE
Anemia is likely due to chronic disease due to ESRD. Most recent hemoglobin and hematocrit are listed below.  Recent Labs     12/27/24  1909 12/28/24  0340 12/29/24  0506   HGB 11.0* 10.7* 10.4*   HCT 34.4* 32.7* 31.3*     Plan  - Monitor serial CBC: Daily  - Transfuse PRBC if patient becomes hemodynamically unstable, symptomatic or H/H drops below 7/21.  - Patient has not received any PRBC transfusions to date  - Patient's anemia is currently stable

## 2024-12-29 NOTE — PLAN OF CARE
Problem: Adult Inpatient Plan of Care  Goal: Plan of Care Review  Outcome: Progressing  Goal: Patient-Specific Goal (Individualized)  Outcome: Progressing  Goal: Absence of Hospital-Acquired Illness or Injury  Outcome: Progressing  Goal: Optimal Comfort and Wellbeing  Outcome: Progressing     Problem: Diabetes Comorbidity  Goal: Blood Glucose Level Within Targeted Range  Outcome: Progressing     Problem: Chest Pain  Goal: Resolution of Chest Pain Symptoms  Outcome: Progressing

## 2024-12-29 NOTE — ASSESSMENT & PLAN NOTE
Triple vessel CAD  Demand ischemia   - Acute, patient reports chest pain is ongoing and intermittent   - EKG with nonspecific ST changes  - CXR with heart size upper limit of normal or mildly enlarged. Mild accentuation of the interstitial markings. No significant airspace consolidation or pleural effusion identified   Recent Labs   Lab 12/28/24  1956 12/28/24  2332 12/29/24  0506   TROPONINIHS 50640* [id number]* [id number]*   - ASA administered with EMS  - Continue ASA, statin, plavix, BB  - Cardiac monitoring  - PRN EKG and sl nitro for chest pain  - K>4, Mg>2  - Previous cardiac testing with multi vessel CAD treated medically with ACS   - ACS protocol with heparin gtts initiated  - Cardiology and interventional cardiology consulted and report the patient's known CAD is not amenable to PCI or CABG & recommend increasing ranolazine and adding imdur to daily regimen.   - Continue to trend troponin to peak   - CTS consulted, appreciate recs

## 2024-12-29 NOTE — ASSESSMENT & PLAN NOTE
Patient has a current diagnosis of Hypertensive emergency with end organ damage evidenced by acute coronary syndrome which is uncontrolled.  Latest blood pressure and vitals reviewed-   Temp:  [97.6 °F (36.4 °C)-98.1 °F (36.7 °C)]   Pulse:  [79-92]   Resp:  [17-20]   BP: ()/(54-66)   SpO2:  [95 %-100 %] .   Patient currently off IV antihypertensives.   Home meds for hypertension were reviewed and noted below.   Hypertension Medications               carvediloL (COREG) 25 MG tablet Take 1 tablet by mouth 2 (two) times daily with meals.    isosorbide mononitrate (IMDUR) 120 MG 24 hr tablet Take 1 tablet (120 mg total) by mouth once daily.    minoxidiL (LONITEN) 10 MG Tab Take 1 tablet (10 mg total) by mouth 2 (two) times daily.    NIFEdipine (PROCARDIA-XL) 90 MG (OSM) 24 hr tablet Take 1 tablet (90 mg total) by mouth once daily.  HOLD if <120    nitroGLYCERIN (NITROSTAT) 0.4 MG SL tablet Place 1 tablet (0.4 mg total) under the tongue every 5 (five) minutes as needed for Chest pain.     Medication adjustment for hospital antihypertensives is as follows- see NSTEMI    Will aim for controlled BP reduction by medications noted above. Monitor and mitigate end organ damage as indicated.

## 2024-12-29 NOTE — ASSESSMENT & PLAN NOTE
Creatine stable for now. BMP reviewed- noted Estimated Creatinine Clearance: 7.7 mL/min (A) (based on SCr of 11.1 mg/dL (H)). according to latest data. Based on current GFR, CKD stage is end stage.  Monitor UOP and serial BMP and adjust therapy as needed. Renally dose meds. Avoid nephrotoxic medications and procedures.  - Nephrology consulted for HD, appreciate recs

## 2024-12-29 NOTE — ASSESSMENT & PLAN NOTE
Patient is identified as having Diastolic (HFpEF) heart failure that is Chronic. CHF is currently controlled.   Latest ECHO performed and demonstrates- Results for orders placed during the hospital encounter of 12/27/24    Echo    Interpretation Summary    Left Ventricle: The left ventricle is normal in size. Increased ventricular mass. Moderately increased wall thickness. There is moderate concentric hypertrophy. Regional wall motion abnormalities present. There is low normal systolic function with a visually estimated ejection fraction of 50 - 55%. Grade II diastolic dysfunction.    Right Ventricle: Normal right ventricular cavity size. Wall thickness is normal. Systolic function is normal.    The left atrium is severely dilated.    Aortic Valve: The aortic valve is a trileaflet valve. There is moderate aortic valve sclerosis. Mildly restricted motion.    Mitral Valve: There is moderate posterior mitral annular calcification present. There is mild regurgitation.    Pulmonary Artery: The estimated pulmonary artery systolic pressure is >25 mmHg.    IVC/SVC: The IVC was not visualized.    Pericardium: There is a small anterior effusion.    - Continue Beta Blocker and monitor clinical status closely.   - Monitor on telemetry.  - Patient is off CHF pathway.   - Monitor strict Is&Os and daily weights. Place on fluid restriction of 1.5 L.   - Continue to stress to patient importance of self efficacy and  on diet for CHF.  - Last BNP reviewed- and noted below   Recent Labs   Lab 12/27/24  0356   BNP 1,322*     - Now appears euvolemic after HD on 12/27

## 2024-12-30 VITALS
BODY MASS INDEX: 24.26 KG/M2 | OXYGEN SATURATION: 99 % | SYSTOLIC BLOOD PRESSURE: 113 MMHG | HEIGHT: 67 IN | RESPIRATION RATE: 18 BRPM | HEART RATE: 83 BPM | WEIGHT: 154.56 LBS | TEMPERATURE: 98 F | DIASTOLIC BLOOD PRESSURE: 57 MMHG

## 2024-12-30 LAB
ALBUMIN SERPL BCP-MCNC: 2.9 G/DL (ref 3.5–5.2)
ALP SERPL-CCNC: 72 U/L (ref 40–150)
ALT SERPL W/O P-5'-P-CCNC: 5 U/L (ref 10–44)
ANION GAP SERPL CALC-SCNC: 17 MMOL/L (ref 8–16)
APTT PPP: 43.1 SEC (ref 21–32)
APTT PPP: 45.9 SEC (ref 21–32)
AST SERPL-CCNC: 13 U/L (ref 10–40)
BASOPHILS # BLD AUTO: 0.08 K/UL (ref 0–0.2)
BASOPHILS NFR BLD: 0.9 % (ref 0–1.9)
BILIRUB SERPL-MCNC: 0.6 MG/DL (ref 0.1–1)
BUN SERPL-MCNC: 72 MG/DL (ref 6–20)
CALCIUM SERPL-MCNC: 8.3 MG/DL (ref 8.7–10.5)
CHLORIDE SERPL-SCNC: 90 MMOL/L (ref 95–110)
CO2 SERPL-SCNC: 22 MMOL/L (ref 23–29)
CREAT SERPL-MCNC: 14.1 MG/DL (ref 0.5–1.4)
DIFFERENTIAL METHOD BLD: ABNORMAL
EOSINOPHIL # BLD AUTO: 0.5 K/UL (ref 0–0.5)
EOSINOPHIL NFR BLD: 5.6 % (ref 0–8)
ERYTHROCYTE [DISTWIDTH] IN BLOOD BY AUTOMATED COUNT: 15.3 % (ref 11.5–14.5)
EST. GFR  (NO RACE VARIABLE): 3.9 ML/MIN/1.73 M^2
GLUCOSE SERPL-MCNC: 114 MG/DL (ref 70–110)
HCT VFR BLD AUTO: 28.9 % (ref 40–54)
HGB BLD-MCNC: 9.5 G/DL (ref 14–18)
IMM GRANULOCYTES # BLD AUTO: 0.05 K/UL (ref 0–0.04)
IMM GRANULOCYTES NFR BLD AUTO: 0.6 % (ref 0–0.5)
LYMPHOCYTES # BLD AUTO: 1.8 K/UL (ref 1–4.8)
LYMPHOCYTES NFR BLD: 19.8 % (ref 18–48)
MAGNESIUM SERPL-MCNC: 2 MG/DL (ref 1.6–2.6)
MCH RBC QN AUTO: 31.1 PG (ref 27–31)
MCHC RBC AUTO-ENTMCNC: 32.9 G/DL (ref 32–36)
MCV RBC AUTO: 95 FL (ref 82–98)
MONOCYTES # BLD AUTO: 1 K/UL (ref 0.3–1)
MONOCYTES NFR BLD: 10.9 % (ref 4–15)
NEUTROPHILS # BLD AUTO: 5.6 K/UL (ref 1.8–7.7)
NEUTROPHILS NFR BLD: 62.2 % (ref 38–73)
NRBC BLD-RTO: 0 /100 WBC
PHOSPHATE SERPL-MCNC: 5.5 MG/DL (ref 2.7–4.5)
PLATELET # BLD AUTO: 334 K/UL (ref 150–450)
PMV BLD AUTO: 9.7 FL (ref 9.2–12.9)
POCT GLUCOSE: 100 MG/DL (ref 70–110)
POCT GLUCOSE: 119 MG/DL (ref 70–110)
POTASSIUM SERPL-SCNC: 4.2 MMOL/L (ref 3.5–5.1)
PROT SERPL-MCNC: 7 G/DL (ref 6–8.4)
RBC # BLD AUTO: 3.05 M/UL (ref 4.6–6.2)
SODIUM SERPL-SCNC: 129 MMOL/L (ref 136–145)
TROPONIN I SERPL DL<=0.01 NG/ML-MCNC: ABNORMAL NG/L (ref 0–35)
TROPONIN I SERPL DL<=0.01 NG/ML-MCNC: ABNORMAL NG/L (ref 0–35)
WBC # BLD AUTO: 8.95 K/UL (ref 3.9–12.7)

## 2024-12-30 PROCEDURE — 80100016 HC MAINTENANCE HEMODIALYSIS

## 2024-12-30 PROCEDURE — 25000003 PHARM REV CODE 250: Performed by: NURSE PRACTITIONER

## 2024-12-30 PROCEDURE — 83735 ASSAY OF MAGNESIUM: CPT | Performed by: PHYSICIAN ASSISTANT

## 2024-12-30 PROCEDURE — 85730 THROMBOPLASTIN TIME PARTIAL: CPT | Performed by: STUDENT IN AN ORGANIZED HEALTH CARE EDUCATION/TRAINING PROGRAM

## 2024-12-30 PROCEDURE — 36415 COLL VENOUS BLD VENIPUNCTURE: CPT | Mod: XB

## 2024-12-30 PROCEDURE — 90935 HEMODIALYSIS ONE EVALUATION: CPT | Mod: ,,,

## 2024-12-30 PROCEDURE — 80053 COMPREHEN METABOLIC PANEL: CPT | Performed by: PHYSICIAN ASSISTANT

## 2024-12-30 PROCEDURE — 84484 ASSAY OF TROPONIN QUANT: CPT

## 2024-12-30 PROCEDURE — 84100 ASSAY OF PHOSPHORUS: CPT | Performed by: PHYSICIAN ASSISTANT

## 2024-12-30 PROCEDURE — 25000003 PHARM REV CODE 250: Performed by: PHYSICIAN ASSISTANT

## 2024-12-30 PROCEDURE — 63600175 PHARM REV CODE 636 W HCPCS: Performed by: PHYSICIAN ASSISTANT

## 2024-12-30 PROCEDURE — 85025 COMPLETE CBC W/AUTO DIFF WBC: CPT | Performed by: PHYSICIAN ASSISTANT

## 2024-12-30 PROCEDURE — 25000003 PHARM REV CODE 250

## 2024-12-30 PROCEDURE — 36415 COLL VENOUS BLD VENIPUNCTURE: CPT | Performed by: PHYSICIAN ASSISTANT

## 2024-12-30 PROCEDURE — 36415 COLL VENOUS BLD VENIPUNCTURE: CPT | Performed by: STUDENT IN AN ORGANIZED HEALTH CARE EDUCATION/TRAINING PROGRAM

## 2024-12-30 RX ORDER — MINOXIDIL 10 MG/1
10 TABLET ORAL 2 TIMES DAILY
Qty: 60 TABLET | Refills: 1 | Status: SHIPPED | OUTPATIENT
Start: 2024-12-30

## 2024-12-30 RX ORDER — SODIUM CHLORIDE 9 MG/ML
INJECTION, SOLUTION INTRAVENOUS ONCE
Status: COMPLETED | OUTPATIENT
Start: 2024-12-30 | End: 2024-12-30

## 2024-12-30 RX ORDER — CARVEDILOL 25 MG/1
25 TABLET ORAL 2 TIMES DAILY WITH MEALS
Qty: 180 TABLET | Refills: 3 | Status: SHIPPED | OUTPATIENT
Start: 2024-12-30 | End: 2025-12-25

## 2024-12-30 RX ADMIN — MINOXIDIL 10 MG: 10 TABLET ORAL at 01:12

## 2024-12-30 RX ADMIN — CARVEDILOL 25 MG: 25 TABLET, FILM COATED ORAL at 07:12

## 2024-12-30 RX ADMIN — SEVELAMER CARBONATE 1600 MG: 800 TABLET, FILM COATED ORAL at 08:12

## 2024-12-30 RX ADMIN — ASPIRIN 81 MG: 81 TABLET, COATED ORAL at 08:12

## 2024-12-30 RX ADMIN — EZETIMIBE 10 MG: 10 TABLET ORAL at 08:12

## 2024-12-30 RX ADMIN — SODIUM CHLORIDE: 9 INJECTION, SOLUTION INTRAVENOUS at 09:12

## 2024-12-30 RX ADMIN — CINACALCET HYDROCHLORIDE 60 MG: 30 TABLET, FILM COATED ORAL at 08:12

## 2024-12-30 RX ADMIN — ATORVASTATIN CALCIUM 80 MG: 40 TABLET, FILM COATED ORAL at 08:12

## 2024-12-30 RX ADMIN — ISOSORBIDE MONONITRATE 120 MG: 60 TABLET, EXTENDED RELEASE ORAL at 09:12

## 2024-12-30 RX ADMIN — SEVELAMER CARBONATE 1600 MG: 800 TABLET, FILM COATED ORAL at 01:12

## 2024-12-30 RX ADMIN — CLOPIDOGREL BISULFATE 75 MG: 75 TABLET ORAL at 08:12

## 2024-12-30 NOTE — NURSING
Pt discharged home via lyft transport. IV DC'd. Telemetry DC'd. DC instructions reviewed with patient. Pt. Verbalized understanding.

## 2024-12-30 NOTE — PLAN OF CARE
[facility]  Discharge Final Note    Primary Care Provider: Mireya Larose MD    Expected Discharge Date: 12/30/2024    Patient discharged to home via Lyft transportation.     Patient's bedside nurse and patient notified of the above.    Discharge Plan A and Plan B have been determined by review of patient's clinical status, future medical and therapeutic needs, and coverage/benefits for post-acute care in coordination with multidisciplinary team members.        Final Discharge Note (most recent)       Final Note - 12/30/24 1612          Final Note    Assessment Type Final Discharge Note     Anticipated Discharge Disposition Home or Self Care        Post-Acute Status    Post-Acute Authorization Other     Other Status No Post-Acute Service Needs (P)                      Important Message from Medicare  Important Message from Medicare regarding Discharge Appeal Rights: Given to patient/caregiver, Explained to patient/caregiver, Signed/date by patient/caregiver     Date IMM was signed: 12/30/24  Time IMM was signed: 1018        Future Appointments   Date Time Provider Department Center   1/9/2025  9:40 AM [facility] [place]   3/31/2025  8:40 AM Mireya Larose MD [facility] [place]        scheduled post-discharge follow-up appointment and information added to AVS.     Genevieve Kidd LMSW  [facility]  Ext. [phone]

## 2024-12-30 NOTE — PLAN OF CARE
Problem: Hemodialysis  Goal: Safe, Effective Therapy Delivery  12/30/2024 1042 by Sarmad Carranza RN  Outcome: Progressing  12/30/2024 1038 by Sarmad Carranza RN  Outcome: Progressing  Goal: Effective Tissue Perfusion  12/30/2024 1042 by Sarmad Carranza RN  Outcome: Progressing  12/30/2024 1038 by Sarmad Carranza RN  Outcome: Progressing  Goal: Absence of Infection Signs and Symptoms  12/30/2024 1042 by Sarmad Carranza RN  Outcome: Progressing  12/30/2024 1038 by Sarmad Carranza RN  Outcome: Progressing

## 2024-12-30 NOTE — PLAN OF CARE
Problem: Adult Inpatient Plan of Care  Goal: Plan of Care Review  Outcome: Progressing     Problem: Hemodialysis  Goal: Safe, Effective Therapy Delivery  Outcome: Progressing     Problem: Diabetes Comorbidity  Goal: Blood Glucose Level Within Targeted Range  Outcome: Progressing     Problem: Hypertension Acute  Goal: Blood Pressure Within Desired Range  Outcome: Progressing     Problem: Chest Pain  Goal: Resolution of Chest Pain Symptoms  Outcome: Progressing

## 2024-12-30 NOTE — NURSING
3.5 hour dialysis complete.  Blood returned.  Needles removed.  Pressure held x 5 minutes.  Hemostasis achieved.  +thrill +bruit    Net UF 1.5L.  Unable to remove more fluid d/t BP drop.  Asymptomatic.  DAVID Potts NP notified.    Transported from [facility] to room 706 via w/c by transporter.

## 2024-12-30 NOTE — CONSULTS
Consult Note  Cardiothoracic Surgery    Inpatient consult to Cardiothoracic Surgery  Consult performed by: Oli Holloway DO  Consult ordered by: Ijeoma Taveras, SONYA        SUBJECTIVE:     History of Present Illness:  Haroldo Dickinson is a 49 y.o. male with ESRD on HD, CAD (HILLARY to mid RCA but now , mid/prox-LAD disease), HFmrEF (40-45%), CVA with RSW, RCC s/p BL nephrectomy, HTN here for chest pain. States he was watching TV when he suddenly experienced onset of dull, pressure-like chest pain on L side of chest which slightly radiated to center chest. Did not radiate to arm or jaw. Denied associated shortness of breath with onset of chest pain (though did report this to ED provider). States chest pain constant and somewhat improves with exertion. Did not diminish with SL NG. States it felt similar to previous chest pain that required HILLARY, however was not able to recall details of that chest pain episode. Does not worsen with deep breaths. No associated cough, fever, or chills. Slight nausea overnight, however overall CP not associated with N/V. States he has not missed any doses of antihypertensive regimen or missed HD sessions. Reports BP approx 140 systolic when he checks at home.      On arrival to [facility], hypertension maximally to 211/102. No supplemental O2 needed. Chest pain present on assessment. BNP 1322, CXR with mild interstitial edema, but no pleural effusions.  -> 364 -> 561. EKG with NSR, flattened T-waves when compared to EKG 10/30/2024, no ST changes.    Cardiac History:   NSTEMI - PCI 2020 with HILLARY to RCA  [facility] 11/2023 with 3vd not amenable to stenting, recommended medical management    The Mid LAD lesion was 90% stenosed.    The Mid RCA lesion was 100% stenosed.    The Prox LAD lesion was 75% stenosed.  PET stress 10/31/24: 2 fixed perfusion abnormaltieis: distal inferior and apical fixed perfusion abnormality (c/w LAD disease),  moderate intensity basal inferior and inferolateral fixed  perfusion abnormality (PLB/LCX territory).   TTE 10/28/24: LVEF 40 - 45%, diastolic dysfunction, LAE, PASP 61     Scheduled Meds:   0.9% NaCl   Intravenous Once    aspirin  81 mg Oral Daily    atorvastatin  80 mg Oral Daily    carvediloL  25 mg Oral BID WM    cinacalcet  60 mg Oral Daily with breakfast    clopidogreL  75 mg Oral Daily    ezetimibe  10 mg Oral Daily    isosorbide mononitrate  120 mg Oral Daily    minoxidiL  10 mg Oral BID    mupirocin   Nasal BID    NIFEdipine  90 mg Oral Daily    ranolazine  1,000 mg Oral Daily    sevelamer carbonate  1,600 mg Oral TID WM     Infusions/Drips:   heparin (porcine) in D5W  0-40 Units/kg/hr Intravenous Continuous 13.3 mL/hr at 12/29/24 2143 19 Units/kg/hr at 12/29/24 2143     PRN Meds:  Current Facility-Administered Medications:     acetaminophen, 650 mg, Oral, Q4H PRN    albuterol-ipratropium, 3 mL, Nebulization, Q4H PRN    aluminum-magnesium hydroxide-simethicone, 30 mL, Oral, QID PRN    bisacodyL, 10 mg, Rectal, Daily PRN    dextrose 50%, 12.5 g, Intravenous, PRN    dextrose 50%, 25 g, Intravenous, PRN    glucagon (human recombinant), 1 mg, Intramuscular, PRN    glucose, 16 g, Oral, PRN    glucose, 24 g, Oral, PRN    heparin (PORCINE), 51.9 Units/kg, Intravenous, PRN    heparin (PORCINE), 30 Units/kg, Intravenous, PRN    insulin aspart U-100, 0-5 Units, Subcutaneous, QID (AC + HS) PRN    melatonin, 6 mg, Oral, Nightly PRN    naloxone, 0.02 mg, Intravenous, PRN    nitroGLYCERIN, 0.4 mg, Sublingual, Q5 Min PRN    ondansetron, 4 mg, Intravenous, Q8H PRN    oxyCODONE, 5 mg, Oral, Q6H PRN    oxyCODONE, 10 mg, Oral, Q6H PRN    polyethylene glycol, 17 g, Oral, Daily PRN    prochlorperazine, 5 mg, Intravenous, Q6H PRN    simethicone, 1 tablet, Oral, QID PRN    sodium chloride 0.9%, 10 mL, Intravenous, Q8H PRN    Review of patient's allergies indicates:   Allergen Reactions    No known allergies        Past Medical History:   Diagnosis Date    CAD (coronary artery disease),  native coronary artery 11/21/2019    Cataract     Diabetes mellitus     Diabetic retinopathy     Dialysis patient     DM type 2 causing renal disease, not at goal     ESRD (end stage renal disease) started dialysis 01/2014 06/05/2014    History of colon polyps 02/10/2021    History of COVID-19 12/29/2022    Hyperparathyroidism, secondary renal 06/05/2014    Hypertension     NSTEMI (non-ST elevated myocardial infarction) 12/21/2013    Organ transplant candidate 06/05/2014    Palliative care encounter 10/29/2024    Pleural effusion 06/07/2024    Pneumonia     Post PTCA 08/26/2020    RCA HILLARY 7-25-20    Renal cell carcinoma of right kidney     Renal hypertension     Stroke      Past Surgical History:   Procedure Laterality Date    ANGIOGRAM, CORONARY, WITH LEFT HEART CATHETERIZATION N/A 7/1/2021    Procedure: Angiogram, Coronary, with Left Heart Cath;  Surgeon: Miki Zendejas MD;  Location: [facility] CATH LAB;  Service: Cardiology;  Laterality: N/A;    ANGIOGRAM, CORONARY, WITH LEFT HEART CATHETERIZATION N/A 11/28/2023    Procedure: Angiogram, Coronary, with Left Heart Cath;  Surgeon: Noah Pendleton MD;  Location: [facility] CATH LAB;  Service: Cardiology;  Laterality: N/A;    COLONOSCOPY N/A 5/3/2017    Procedure: COLONOSCOPY;  Surgeon: Rufino Carpenter MD;  Location: [facility] (4TH FLR);  Service: Endoscopy;  Laterality: N/A;  pt states can only schedule on Wednesdays    COLONOSCOPY N/A 2/9/2021    Procedure: COLONOSCOPY;  Surgeon: Edil Wong MD;  Location: [facility] ENDO (4TH FLR);  Service: Endoscopy;  Laterality: N/A;  Dialysis MWF/ labwork day of procedure  right arm aceess  per Dr. Colin pt can hold Plavix 5 days prior see note- sm  COVID test on 2/6/21 at [facility] - sm    COLONOSCOPY N/A 2/10/2021    Procedure: COLONOSCOPY;  Surgeon: Robert Ayers MD;  Location: [facility] ENDO (4TH FLR);  Service: Endoscopy;  Laterality: N/A;  rescheduled due to poor bowel prep-BB  negative covid screening 2/6/21-BB  dialysis M-W-F-BB  okay  to r/s for 2/9/21 and to hold Plavix per Dr. KIRAN Wong-BB  labs same day-BB    CORONARY STENT PLACEMENT N/A 7/25/2019    Procedure: INSERTION, STENT, CORONARY ARTERY;  Surgeon: Miki Zendejas MD;  Location: [facility] CATH LAB;  Service: Cardiology;  Laterality: N/A;    DIALYSIS FISTULA CREATION      FISTULOGRAM N/A 2/18/2019    Procedure: Fistulogram;  Surgeon: Yaneth De La Cruz MD;  Location: [facility] CATH LAB;  Service: Cardiology;  Laterality: N/A;    FISTULOGRAM Right 7/23/2019    Procedure: Fistulogram;  Surgeon: Oz Cordoba MD;  Location: [facility] CATH LAB;  Service: Cardiology;  Laterality: Right;    LAPAROSCOPIC ROBOT-ASSISTED SURGICAL REMOVAL OF KIDNEY USING DA JAIME XI Right 5/19/2022    Procedure: XI ROBOTIC NEPHRECTOMY;  Surgeon: Aidan Hendricks MD;  [facility];  Service: Urology;  Laterality: Right;  3hrs    LAPAROSCOPIC ROBOT-ASSISTED SURGICAL REMOVAL OF KIDNEY USING DA JAIME XI Left 1/5/2023    Procedure: XI ROBOTIC NEPHRECTOMY;  Surgeon: Aidan Hendricks MD;  [facility];  Service: Urology;  Laterality: Left;  4 hrs    LEFT HEART CATHETERIZATION Left 7/25/2019    Procedure: Left heart cath;  Surgeon: Miki Zendejas MD;  Location: [facility] CATH LAB;  Service: Cardiology;  Laterality: Left;    REPAIR  1/5/2023    Procedure: REPAIR; COLOTOMY;  Surgeon: Maikel Lamb MD;  [facility];  Service: General;;    RETINAL LASER PROCEDURE Bilateral 2018 or 2017    Dr. Rothman    VASCULAR SURGERY       Family History   Problem Relation Name Age of Onset    Hypertension Mother      Diabetes Mother      Cataracts Mother      Hypertension Father      Hypertension Sister      Kidney disease Brother      Hypertension Brother      Heart disease Brother      Cancer Maternal Grandfather          Colon CA    Colon cancer Neg Hx      Esophageal cancer Neg Hx      Stomach cancer Neg Hx      Rectal cancer Neg Hx      Ulcerative colitis Neg Hx      Irritable bowel syndrome Neg  Hx      Crohn's disease Neg Hx      Celiac disease Neg Hx      Glaucoma Neg Hx      Macular degeneration Neg Hx       Social History     Tobacco Use    Smoking status: Never    Smokeless tobacco: Never   Substance Use Topics    Alcohol use: Not Currently     Comment: One drink a month    Drug use: No        Review of Systems:  A 12-point ROS was obtained and noted to be negative except for that listed in the HPI.      OBJECTIVE:     Vital Signs (Most Recent)  Temp: 97.7 °F (36.5 °C) (12/30/24 0413)  Pulse: 83 (12/30/24 0413)  Resp: 20 (12/30/24 0413)  BP: (!) 109/56 (12/30/24 0413)  SpO2: 99 % (12/30/24 0413)    Admission Weight: 77.1 kg (170 lb) (12/27/24 0234)   Most Recent Weight: 70.1 kg (154 lb 8.7 oz) (12/28/24 1153)    Vital Signs Range (Last 24H):  Temp:  [97.5 °F (36.4 °C)-98.1 °F (36.7 °C)]   Pulse:  [78-88]   Resp:  [17-20]   BP: (103-120)/(56-66)   SpO2:  [96 %-99 %]     Physical Exam:  Constitutional:       General: He is not in acute distress.     Appearance: Normal appearance.   HENT:      Head: Normocephalic and atraumatic.      Right Ear: External ear normal.      Left Ear: External ear normal.      Mouth/Throat:      Mouth: Mucous membranes are moist.      Pharynx: Oropharynx is clear.   Cardiovascular:      Rate and Rhythm: Normal rate and regular rhythm.      Pulses: Normal pulses.      Heart sounds: Murmur heard.      Comments: Holosystolic murmur beast heard at SB  Pulmonary:      Effort: Pulmonary effort is normal.      Breath sounds: Normal breath sounds. No rales.   Neurological:      Mental Status: He is alert and oriented.     Laboratory:  I have reviewed all pertinent lab results within the past 24 hours.    Diagnostic Results:  CT: Reviewed  Cardiac Cath: Reviewed    ASSESSMENT/PLAN:     Haroldo Dickinson is a 49 y.o. male with ESRD on HD, CAD (HILLARY to mid RCA but now , mid/prox-LAD disease), HFmrEF (40-45%), CVA with RSW, RCC s/p BL nephrectomy, HTN admitted with  NSTEMI.    Unfortunately, there would be negligible benefit to surgical revascularization given fixed perfusion abnormalities/transmural scarring on recent cardiac PET stress test. Recommend ongoing medical management. Discussed with patient who is understanding and agreement.    Oli Holloway,   Cardiothoracic Surgery Fellow

## 2024-12-30 NOTE — NURSING
APTT now therapeutic, no changes to heparin infusing, next drawn in am, infusing at   19 units/kg/hr and 13.3 cc/hr.  Resting well in bed. No distress noted, no c/o of chest pain. Remains SR on tele monitor. Monitoring ongoing.

## 2024-12-30 NOTE — PLAN OF CARE
Problem: Adult Inpatient Plan of Care  Goal: Plan of Care Review  Outcome: Adequate for Care Transition  Goal: Patient-Specific Goal (Individualized)  Outcome: Adequate for Care Transition  Goal: Absence of Hospital-Acquired Illness or Injury  Outcome: Adequate for Care Transition  Goal: Optimal Comfort and Wellbeing  Outcome: Adequate for Care Transition  Goal: Readiness for Transition of Care  Outcome: Adequate for Care Transition     Problem: Hemodialysis  Goal: Safe, Effective Therapy Delivery  Outcome: Adequate for Care Transition  Goal: Effective Tissue Perfusion  Outcome: Adequate for Care Transition  Goal: Absence of Infection Signs and Symptoms  Outcome: Adequate for Care Transition     Problem: Diabetes Comorbidity  Goal: Blood Glucose Level Within Targeted Range  Outcome: Adequate for Care Transition     Problem: Chest Pain  Goal: Resolution of Chest Pain Symptoms  Outcome: Adequate for Care Transition     Problem: Hypertension Acute  Goal: Blood Pressure Within Desired Range  Outcome: Adequate for Care Transition

## 2025-01-02 ENCOUNTER — TELEPHONE (OUTPATIENT)
Dept: TRANSPLANT | Facility: CLINIC | Age: 50
End: 2025-01-02
Payer: MEDICARE

## 2025-01-02 DIAGNOSIS — I50.9 CONGESTIVE HEART FAILURE, UNSPECIFIED HF CHRONICITY, UNSPECIFIED HEART FAILURE TYPE: Primary | ICD-10-CM

## 2025-01-02 NOTE — DISCHARGE SUMMARY
[facility] - Observation [address] [place] [address] Medicine  Discharge Summary      Patient Name: Haroldo Dickinson  MRN: [id number]  SADIE: [id number]  Patient Class: IP- Inpatient  Admission Date: 12/27/2024  Hospital Length of Stay: 2 days  Discharge Date and Time: 12/30/2024  3:53 PM  Attending Physician: Elizabeth att. providers found   Discharging Provider: Ijeoma Taveras PA-C  Primary Care Provider: Mireya Larose MD  [facility] Medicine Team: [facility] MED E Ijeoma Taveras PA-C  Primary Care Team: [facility] MED E    HPI:   Haroldo Dickinson is a 49 y.o. male with PMHx significant for mutli vessel CAD, HTN, ESRD on HD MWF, HLD admitted to  for ACS rule out. Patient reports left sided chest pressure that radiates to his substernal area with associated SOB. Reports that these symptoms are typical of his usually chest pain, but states today was more intensified. EMS was called, and patient given ASA and nitro en route with improvement of symptoms. Endorses compliance with medications and HD. Denies fever/chills, diaphoresis, lightheadedness, HA, cough, congestion, abdominal pain, n/v/d, urinary symptoms, changes in BMs, numbness/tingling, weakness. Of note, patient had abnormal PET stress test in October of this year and was ultimately treated with 48 hours of ACS.     In the ED, patient hypertensive to max 208/101. Remaining vitals stable. BNP 1322. HS troponin 240>364. K 4.1. Hgb 10.5. EKG with NSR and nonspecific ST changes. Given home antihypertensive meds (coreg, hydralazine, imdur, nifedipine) with mild improvement of BP. Given morphine and nitro x1 in the ED with temporary improvement of pain. Nephrology consulted for HD.    * No surgery found *      Hospital Course:   Mr. Dickinson was admitted to hospital medicine for NSTEMI, thought to be secondary to demand from hypertensive emergency and hypervolemia in the setting of ESRD. EKG with nonspecific ST changes. Pt was dialyzed on 12/27 with significant improvement in BP and volume status.  Pt with intermittent and ongoing chest pain and troponin persistently increasing, now up to >18,000. Cardiology and interventional cardiology consulted and report the patient's known CAD is not amenable to PCI or CABG & recommend increasing ranolazine and adding imdur to daily regimen. ACS protocol with heparin gtts initiated by hospital medicine which was continued for ~72 hrs.    Pt was seen and evaluated by me this morning, reports feeling well, and is eager to discharge home. All questions were answered. Patient acknowledged understanding of discharge instructions and feels safe to discharge home. Patient was discharged on 1/1/2025 in stable condition with cardiology & interventional cardiology follow-up. Education regarding condition provided and return precautions given.     Physical Exam  Gen: in NAD, appears stated age  Neuro: AAOx3, motor, sensory, and strength grossly intact BL  HEENT: EOMI, PERRLA; no JVD appreciated  CVS: RRR, no m/r/g  Resp: lungs CTAB, no w/r/r; no belabored breathing or accessory muscle use appreciated   Abd: NTND, soft to palpation  Extrem: no UE or LE edema BL      Goals of Care Treatment Preferences:  Code Status: Full Code    Health care agent: Ariadne Dickinson (sister)  Health care agent number: [phone]          What is most important right now is to focus on extending life as long as possible, even it it means sacrificing quality, curative/life-prolongation (regardless of treatment burdens).  Accordingly, we have decided that the best plan to meet the patient's goals includes continuing with treatment.      SDOH Screening:  The patient was screened for utility difficulties, food insecurity, transport difficulties, housing insecurity, and interpersonal safety and there were no concerns identified this admission.     Consults:   Consults (From admission, onward)          Status Ordering Provider     Inpatient consult to Cardiothoracic Surgery  Once        Provider:  (Not yet  assigned)    Completed ASHOK SOFIA     Inpatient consult to Interventional Cardiology  Once        Provider:  (Not yet assigned)    Completed ASHOK SOFIA     Inpatient consult to Cardiology  Once        Provider:  (Not yet assigned)    Completed ASHOK SOFIA     Inpatient consult to Nephrology  Once        Provider:  (Not yet assigned)    Completed PIPER JOHNSON              Final Active Diagnoses:    Diagnosis Date Noted POA    PRINCIPAL PROBLEM:  NSTEMI (non-ST elevated myocardial infarction) [I21.4] 12/27/2024 Yes    Demand ischemia [I24.89] 12/27/2024 Yes    Triple vessel coronary artery disease [I25.10] 09/03/2024 Yes    Type 2 diabetes mellitus with chronic kidney disease on chronic dialysis, without long-term current use of insulin [E11.22, N18.6, Z99.2] 03/10/2024 Not Applicable    Hypertensive emergency [I16.1] 11/27/2023 Yes     Chronic    Chronic diastolic heart failure [I50.32] 05/02/2023 Yes    Anemia in end-stage renal disease [N18.6, D63.1] 06/14/2022 Yes    Essential hypertension [I10] 11/21/2019 Yes     Chronic    ESRD on hemodialysis [N18.6, Z99.2] 07/23/2019 Not Applicable    Hemiparesis affecting right side as late effect of cerebrovascular accident [I69.351] 02/15/2016 Not Applicable      Problems Resolved During this Admission:       Discharged Condition: good    Disposition: Home or Self Care    Follow Up:    Patient Instructions:      ACCEPT - Ambulatory referral/consult to Adult Advanced Heart Failure   Standing Status: Future   Referral Priority: Routine Referral Type: Transplants   Number of Visits Requested: 1     Ambulatory referral/consult to Cardiology   Standing Status: Future   Referral Priority: Urgent Referral Type: Consultation   Referral Reason: Specialty Services Required   Requested Specialty: Cardiology   Number of Visits Requested: 1     Ambulatory referral/consult to Interventional Cardiology   Standing Status: Future   Referral Priority: Routine Referral  Type: Consultation   Referral Reason: Specialty Services Required   Requested Specialty: Interventional Cardiology   Number of Visits Requested: 1     Notify your health care provider if you experience any of the following:  temperature >100.4     Notify your health care provider if you experience any of the following:  severe uncontrolled pain     Notify your health care provider if you experience any of the following:  difficulty breathing or increased cough     Notify your health care provider if you experience any of the following:  persistent dizziness, light-headedness, or visual disturbances     Notify your health care provider if you experience any of the following:  increased confusion or weakness     Activity as tolerated       Significant Diagnostic Studies:  Echo  Addendum:   Left Ventricle: The left ventricle is normal in size. Increased  ventricular mass. Moderately increased wall thickness. There is moderate  concentric hypertrophy. Regional wall motion abnormalities present. There  is low normal systolic function with a visually estimated ejection  fraction of 50 - 55%. Grade II diastolic dysfunction.     Right Ventricle: Normal right ventricular cavity size. Wall thickness  is normal. Systolic function is normal.     The left atrium is severely dilated.     Aortic Valve: The aortic valve is a trileaflet valve. There is moderate  aortic valve sclerosis. Mildly restricted motion.     Mitral Valve: There is moderate posterior mitral annular calcification  present. There is mild regurgitation.     Pulmonary Artery: The estimated pulmonary artery systolic pressure is  >25 mmHg.     IVC/SVC: The IVC was not visualized.     Pericardium: There is a small anterior effusion.  Narrative:   Left Ventricle: The left ventricle is normal in size. Increased   ventricular mass. Moderately increased wall thickness. There is moderate   concentric hypertrophy. Regional wall motion abnormalities present. There   is low  normal systolic function with a visually estimated ejection   fraction of 50 - 55%. Grade II diastolic dysfunction.    Right Ventricle: Normal right ventricular cavity size. Wall thickness   is normal. Systolic function is normal.    The left atrium is severely dilated.    Aortic Valve: The aortic valve is a trileaflet valve. There is moderate   aortic valve sclerosis. Mildly restricted motion.    Mitral Valve: There is moderate posterior mitral annular calcification   present. There is mild regurgitation.    Pulmonary Artery: The estimated pulmonary artery systolic pressure is   >25 mmHg.    IVC/SVC: The IVC was not visualized.      Medications:  Reconciled Home Medications:      Medication List        CHANGE how you take these medications      carvediloL 25 MG tablet  Commonly known as: COREG  Take 1 tablet (25 mg total) by mouth 2 (two) times daily with meals.  What changed: how much to take            CONTINUE taking these medications      aspirin 81 MG EC tablet  Commonly known as: ECOTRIN  Take 1 tablet (81 mg total) by mouth once daily.     atorvastatin 80 MG tablet  Commonly known as: LIPITOR  Take 1 tablet (80 mg total) by mouth once daily.     ciclopirox 8 % Soln  Commonly known as: PENLAC  Apply topically nightly.     clopidogreL 75 mg tablet  Commonly known as: PLAVIX  Take 1 tablet (75 mg total) by mouth once daily.     epoetin philip 4,000 unit/mL injection  Commonly known as: PROCRIT  Inject 1.93 mLs (7,720 Units total) into the skin every Mon, Wed, Fri.     ezetimibe 10 mg tablet  Commonly known as: ZETIA  Take 1 tablet (10 mg total) by mouth once daily.     isosorbide mononitrate 120 MG 24 hr tablet  Commonly known as: IMDUR  Take 1 tablet (120 mg total) by mouth once daily.     minoxidiL 10 MG Tab  Commonly known as: LONITEN  Take 1 tablet (10 mg total) by mouth 2 (two) times daily.     MIRCERA INJ  150 mcg.     NIFEdipine 90 MG (OSM) 24 hr tablet  Commonly known as: PROCARDIA-XL  Take 1 tablet (90  mg total) by mouth once daily.     nitroGLYCERIN 0.4 MG SL tablet  Commonly known as: NITROSTAT  Place 1 tablet (0.4 mg total) under the tongue every 5 (five) minutes as needed for Chest pain.     polyethylene glycol 17 gram/dose powder  Commonly known as: GLYCOLAX  Take 17 g by mouth daily as needed for Constipation.     ranolazine 500 MG Tb12  Commonly known as: RANEXA  Take 1 tablet (500 mg total) by mouth Daily.     RENAPLEX 800 mcg- 12.5 mg Tab  Generic drug: vit B complex-C-folic ac-zinc  Take 1 tablet by mouth.     sevelamer carbonate 800 mg Tab  Commonly known as: RENVELA  Take 2 tablets (1,600 mg total) by mouth 3 (three) times daily with meals.           Indwelling Lines/Drains at time of discharge:   Lines/Drains/Airways       Drain  Duration                  Hemodialysis AV Fistula Right forearm -- days                    Time spent on the discharge of patient: 36 minutes         Ijeoma Taveras PA-C  Department of Hospital Medicine  [facility] - Observation 11H

## 2025-01-09 NOTE — PHYSICIAN QUERY
Please clarify the conflicting documentation.      Other (please specify): clinically undetermined

## 2025-01-09 NOTE — PHYSICIAN QUERY
Question: Please clarify the conflicting cardiac diagnosis related to the below documentation.     Provider Query Response:  Clinically undetermined

## 2025-01-13 RX ORDER — CLOPIDOGREL BISULFATE 75 MG/1
75 TABLET ORAL DAILY
Qty: 90 TABLET | Refills: 3 | Status: SHIPPED | OUTPATIENT
Start: 2025-01-13 | End: 2026-01-13

## 2025-01-30 ENCOUNTER — TELEPHONE (OUTPATIENT)
Dept: FAMILY MEDICINE | Facility: CLINIC | Age: 50
End: 2025-01-30
Payer: MEDICARE

## 2025-01-30 ENCOUNTER — TELEPHONE (OUTPATIENT)
Dept: CARDIOLOGY | Facility: CLINIC | Age: 50
End: 2025-01-30
Payer: MEDICARE

## 2025-01-30 RX ORDER — NITROGLYCERIN 0.4 MG/1
0.4 TABLET SUBLINGUAL EVERY 5 MIN PRN
Qty: 25 TABLET | Refills: 1 | OUTPATIENT
Start: 2025-01-30

## 2025-01-30 NOTE — TELEPHONE ENCOUNTER
----- Message from Camila sent at 1/30/2025 12:17 PM CST -----  .Type: Patient Call Back    Who called: [facility]    What is the request in detail: Would like to know if patient have a Cardiologist and how does patient take ranolazine (RANEXA) 500 MG Tb12    Can the clinic reply by [facility]? No     Would the patient rather a call back or a response via [facility]? Call Back     Best call back number: [phone]    Additional Information:

## 2025-01-30 NOTE — TELEPHONE ENCOUNTER
----- Message from Melani sent at 1/30/2025 12:58 PM CST -----  Pharmacy Calling to Clarify an RX     Name of TOMMIE Brown [[id number]] [facility]      Pharmacy Name :[facility]         Prescription Name:ranolazine (RANEXA) 500 MG Tb12     What do they need to clarify? The hospital state that they wanted the pt to take an different dose and they just will like to  make sure what the dr wants      Best Call Back Number:[phone]     Additional Information:

## 2025-01-30 NOTE — TELEPHONE ENCOUNTER
Gabriella/[employer] explained that [employer] works with [employer] and other insurances to help patients with Chronic Kidney Disease to take better care of themselves and to control the kidney disease as best they could to avoid dialysis.  Advised Gabriella/[facility] of cardiologist listed in chart - Dr. Luke Gonzalez and the directions for medication as stated in chart.  Gabriella stated that the patient's discharge papers from hospital stay stated that patient should increase to 1000 mg per day.  Advised Gabriella to contact prescribing doctor or cardiologist to discuss.  Name and phone number to prescribing doctor given.  Stated she was able to find information for cardiologist on line.  Please be advised.

## 2025-02-09 ENCOUNTER — HOSPITAL ENCOUNTER (INPATIENT)
Facility: HOSPITAL | Age: 50
LOS: 1 days | Discharge: HOME | DRG: 286 | End: 2025-02-11
Attending: EMERGENCY MEDICINE | Admitting: HOSPITALIST
Payer: MEDICARE

## 2025-02-09 DIAGNOSIS — R07.9 CHEST PAIN: Primary | ICD-10-CM

## 2025-02-09 DIAGNOSIS — I25.118 CORONARY ARTERY DISEASE OF NATIVE ARTERY WITH STABLE ANGINA PECTORIS: ICD-10-CM

## 2025-02-09 DIAGNOSIS — Z99.2 ESRD ON DIALYSIS: ICD-10-CM

## 2025-02-09 DIAGNOSIS — I25.10 CAD (CORONARY ARTERY DISEASE): ICD-10-CM

## 2025-02-09 DIAGNOSIS — M79.602 PAIN OF LEFT UPPER EXTREMITY: ICD-10-CM

## 2025-02-09 DIAGNOSIS — R07.9 RECURRENT CHEST PAIN: ICD-10-CM

## 2025-02-09 DIAGNOSIS — N18.6 ESRD ON DIALYSIS: ICD-10-CM

## 2025-02-09 LAB
ALBUMIN SERPL BCP-MCNC: 3.6 G/DL (ref 3.5–5.2)
ALP SERPL-CCNC: 87 U/L (ref 40–150)
ALT SERPL W/O P-5'-P-CCNC: 9 U/L (ref 10–44)
ANION GAP SERPL CALC-SCNC: 16 MMOL/L (ref 8–16)
APTT PPP: 27.1 SEC (ref 21–32)
APTT PPP: 27.3 SEC (ref 21–32)
AST SERPL-CCNC: 11 U/L (ref 10–40)
BASOPHILS # BLD AUTO: 0.07 K/UL (ref 0–0.2)
BASOPHILS # BLD AUTO: 0.08 K/UL (ref 0–0.2)
BASOPHILS NFR BLD: 0.8 % (ref 0–1.9)
BASOPHILS NFR BLD: 0.8 % (ref 0–1.9)
BILIRUB SERPL-MCNC: 0.6 MG/DL (ref 0.1–1)
BNP SERPL-MCNC: 610 PG/ML (ref 0–99)
BUN SERPL-MCNC: 52 MG/DL (ref 6–20)
CALCIUM SERPL-MCNC: 9.7 MG/DL (ref 8.7–10.5)
CHLORIDE SERPL-SCNC: 101 MMOL/L (ref 95–110)
CO2 SERPL-SCNC: 21 MMOL/L (ref 23–29)
CREAT SERPL-MCNC: 9.8 MG/DL (ref 0.5–1.4)
CTP QC/QA: YES
CTP QC/QA: YES
DIFFERENTIAL METHOD BLD: ABNORMAL
DIFFERENTIAL METHOD BLD: ABNORMAL
EOSINOPHIL # BLD AUTO: 0.5 K/UL (ref 0–0.5)
EOSINOPHIL # BLD AUTO: 0.5 K/UL (ref 0–0.5)
EOSINOPHIL NFR BLD: 4.4 % (ref 0–8)
EOSINOPHIL NFR BLD: 5.5 % (ref 0–8)
ERYTHROCYTE [DISTWIDTH] IN BLOOD BY AUTOMATED COUNT: 16.7 % (ref 11.5–14.5)
ERYTHROCYTE [DISTWIDTH] IN BLOOD BY AUTOMATED COUNT: 16.9 % (ref 11.5–14.5)
EST. GFR  (NO RACE VARIABLE): 6 ML/MIN/1.73 M^2
GLUCOSE SERPL-MCNC: 177 MG/DL (ref 70–110)
HCT VFR BLD AUTO: 38.7 % (ref 40–54)
HCT VFR BLD AUTO: 43.4 % (ref 40–54)
HGB BLD-MCNC: 11.9 G/DL (ref 14–18)
HGB BLD-MCNC: 13.8 G/DL (ref 14–18)
IMM GRANULOCYTES # BLD AUTO: 0.05 K/UL (ref 0–0.04)
IMM GRANULOCYTES # BLD AUTO: 0.05 K/UL (ref 0–0.04)
IMM GRANULOCYTES NFR BLD AUTO: 0.5 % (ref 0–0.5)
IMM GRANULOCYTES NFR BLD AUTO: 0.5 % (ref 0–0.5)
INR PPP: 1.1 (ref 0.8–1.2)
INR PPP: 1.1 (ref 0.8–1.2)
LYMPHOCYTES # BLD AUTO: 1.4 K/UL (ref 1–4.8)
LYMPHOCYTES # BLD AUTO: 1.5 K/UL (ref 1–4.8)
LYMPHOCYTES NFR BLD: 14.3 % (ref 18–48)
LYMPHOCYTES NFR BLD: 15.1 % (ref 18–48)
MAGNESIUM SERPL-MCNC: 2.1 MG/DL (ref 1.6–2.6)
MCH RBC QN AUTO: 30.4 PG (ref 27–31)
MCH RBC QN AUTO: 30.9 PG (ref 27–31)
MCHC RBC AUTO-ENTMCNC: 30.7 G/DL (ref 32–36)
MCHC RBC AUTO-ENTMCNC: 31.8 G/DL (ref 32–36)
MCV RBC AUTO: 97 FL (ref 82–98)
MCV RBC AUTO: 99 FL (ref 82–98)
MONOCYTES # BLD AUTO: 1.1 K/UL (ref 0.3–1)
MONOCYTES # BLD AUTO: 1.3 K/UL (ref 0.3–1)
MONOCYTES NFR BLD: 12 % (ref 4–15)
MONOCYTES NFR BLD: 12.3 % (ref 4–15)
NEUTROPHILS # BLD AUTO: 6 K/UL (ref 1.8–7.7)
NEUTROPHILS # BLD AUTO: 7.2 K/UL (ref 1.8–7.7)
NEUTROPHILS NFR BLD: 65.8 % (ref 38–73)
NEUTROPHILS NFR BLD: 68 % (ref 38–73)
NRBC BLD-RTO: 0 /100 WBC
NRBC BLD-RTO: 0 /100 WBC
OHS QRS DURATION: 126 MS
OHS QTC CALCULATION: 485 MS
PHOSPHATE SERPL-MCNC: 6.3 MG/DL (ref 2.7–4.5)
PLATELET # BLD AUTO: 287 K/UL (ref 150–450)
PLATELET # BLD AUTO: 298 K/UL (ref 150–450)
PMV BLD AUTO: 10 FL (ref 9.2–12.9)
PMV BLD AUTO: 9.5 FL (ref 9.2–12.9)
POC MOLECULAR INFLUENZA A AGN: NEGATIVE
POC MOLECULAR INFLUENZA B AGN: NEGATIVE
POTASSIUM SERPL-SCNC: 4.5 MMOL/L (ref 3.5–5.1)
PROT SERPL-MCNC: 8.1 G/DL (ref 6–8.4)
PROTHROMBIN TIME: 11.9 SEC (ref 9–12.5)
PROTHROMBIN TIME: 11.9 SEC (ref 9–12.5)
RBC # BLD AUTO: 3.92 M/UL (ref 4.6–6.2)
RBC # BLD AUTO: 4.47 M/UL (ref 4.6–6.2)
SARS-COV-2 RDRP RESP QL NAA+PROBE: NEGATIVE
SODIUM SERPL-SCNC: 138 MMOL/L (ref 136–145)
TROPONIN I SERPL DL<=0.01 NG/ML-MCNC: 0.21 NG/ML (ref 0–0.03)
TROPONIN I SERPL DL<=0.01 NG/ML-MCNC: 0.78 NG/ML (ref 0–0.03)
TROPONIN I SERPL DL<=0.01 NG/ML-MCNC: 8.45 NG/ML (ref 0–0.03)
TSH SERPL DL<=0.005 MIU/L-ACNC: 1.68 UIU/ML (ref 0.4–4)
WBC # BLD AUTO: 10.52 K/UL (ref 3.9–12.7)
WBC # BLD AUTO: 9.19 K/UL (ref 3.9–12.7)

## 2025-02-09 PROCEDURE — 93010 ELECTROCARDIOGRAM REPORT: CPT | Mod: HCNC,,, | Performed by: INTERNAL MEDICINE

## 2025-02-09 PROCEDURE — 85730 THROMBOPLASTIN TIME PARTIAL: CPT | Mod: HCNC | Performed by: EMERGENCY MEDICINE

## 2025-02-09 PROCEDURE — 63600175 PHARM REV CODE 636 W HCPCS: Mod: HCNC

## 2025-02-09 PROCEDURE — 25000003 PHARM REV CODE 250: Performed by: EMERGENCY MEDICINE

## 2025-02-09 PROCEDURE — 83735 ASSAY OF MAGNESIUM: CPT | Mod: HCNC | Performed by: EMERGENCY MEDICINE

## 2025-02-09 PROCEDURE — G0378 HOSPITAL OBSERVATION PER HR: HCPCS | Mod: HCNC

## 2025-02-09 PROCEDURE — 96376 TX/PRO/DX INJ SAME DRUG ADON: CPT | Mod: HCNC

## 2025-02-09 PROCEDURE — 85025 COMPLETE CBC W/AUTO DIFF WBC: CPT | Mod: 91,HCNC

## 2025-02-09 PROCEDURE — 84100 ASSAY OF PHOSPHORUS: CPT | Mod: HCNC | Performed by: EMERGENCY MEDICINE

## 2025-02-09 PROCEDURE — 84484 ASSAY OF TROPONIN QUANT: CPT | Mod: 91,HCNC

## 2025-02-09 PROCEDURE — 87502 INFLUENZA DNA AMP PROBE: CPT | Mod: HCNC

## 2025-02-09 PROCEDURE — 93005 ELECTROCARDIOGRAM TRACING: CPT

## 2025-02-09 PROCEDURE — 85610 PROTHROMBIN TIME: CPT | Mod: HCNC | Performed by: EMERGENCY MEDICINE

## 2025-02-09 PROCEDURE — 96375 TX/PRO/DX INJ NEW DRUG ADDON: CPT | Mod: HCNC

## 2025-02-09 PROCEDURE — 84443 ASSAY THYROID STIM HORMONE: CPT | Mod: HCNC | Performed by: EMERGENCY MEDICINE

## 2025-02-09 PROCEDURE — 99214 OFFICE O/P EST MOD 30 MIN: CPT | Mod: HCNC,,, | Performed by: INTERNAL MEDICINE

## 2025-02-09 PROCEDURE — 85730 THROMBOPLASTIN TIME PARTIAL: CPT | Mod: 91,HCNC | Performed by: HOSPITALIST

## 2025-02-09 PROCEDURE — 63600175 PHARM REV CODE 636 W HCPCS: Performed by: EMERGENCY MEDICINE

## 2025-02-09 PROCEDURE — 87635 SARS-COV-2 COVID-19 AMP PRB: CPT | Performed by: EMERGENCY MEDICINE

## 2025-02-09 PROCEDURE — 25000003 PHARM REV CODE 250: Mod: HCNC

## 2025-02-09 PROCEDURE — 84484 ASSAY OF TROPONIN QUANT: CPT | Mod: HCNC | Performed by: EMERGENCY MEDICINE

## 2025-02-09 PROCEDURE — 96366 THER/PROPH/DIAG IV INF ADDON: CPT | Mod: HCNC

## 2025-02-09 PROCEDURE — 83880 ASSAY OF NATRIURETIC PEPTIDE: CPT | Mod: HCNC | Performed by: EMERGENCY MEDICINE

## 2025-02-09 PROCEDURE — 99285 EMERGENCY DEPT VISIT HI MDM: CPT | Mod: 25,HCNC

## 2025-02-09 PROCEDURE — 80053 COMPREHEN METABOLIC PANEL: CPT | Mod: HCNC | Performed by: EMERGENCY MEDICINE

## 2025-02-09 PROCEDURE — 96372 THER/PROPH/DIAG INJ SC/IM: CPT

## 2025-02-09 PROCEDURE — 63600175 PHARM REV CODE 636 W HCPCS: Mod: JZ,TB | Performed by: STUDENT IN AN ORGANIZED HEALTH CARE EDUCATION/TRAINING PROGRAM

## 2025-02-09 PROCEDURE — 96365 THER/PROPH/DIAG IV INF INIT: CPT | Mod: HCNC

## 2025-02-09 PROCEDURE — 85610 PROTHROMBIN TIME: CPT | Mod: 91,HCNC | Performed by: HOSPITALIST

## 2025-02-09 PROCEDURE — 85025 COMPLETE CBC W/AUTO DIFF WBC: CPT | Mod: HCNC | Performed by: EMERGENCY MEDICINE

## 2025-02-09 PROCEDURE — 93005 ELECTROCARDIOGRAM TRACING: CPT | Mod: HCNC

## 2025-02-09 RX ORDER — EZETIMIBE 10 MG/1
10 TABLET ORAL DAILY
Status: DISCONTINUED | OUTPATIENT
Start: 2025-02-09 | End: 2025-02-11 | Stop reason: HOSPADM

## 2025-02-09 RX ORDER — NALOXONE HCL 0.4 MG/ML
0.02 VIAL (ML) INJECTION
Status: DISCONTINUED | OUTPATIENT
Start: 2025-02-09 | End: 2025-02-11 | Stop reason: HOSPADM

## 2025-02-09 RX ORDER — IBUPROFEN 200 MG
24 TABLET ORAL
Status: DISCONTINUED | OUTPATIENT
Start: 2025-02-09 | End: 2025-02-11 | Stop reason: HOSPADM

## 2025-02-09 RX ORDER — HYDRALAZINE HYDROCHLORIDE 25 MG/1
25 TABLET, FILM COATED ORAL EVERY 8 HOURS
Status: DISCONTINUED | OUTPATIENT
Start: 2025-02-09 | End: 2025-02-11 | Stop reason: HOSPADM

## 2025-02-09 RX ORDER — SODIUM CHLORIDE 0.9 % (FLUSH) 0.9 %
10 SYRINGE (ML) INJECTION EVERY 12 HOURS PRN
Status: DISCONTINUED | OUTPATIENT
Start: 2025-02-09 | End: 2025-02-11 | Stop reason: HOSPADM

## 2025-02-09 RX ORDER — HEPARIN SODIUM 5000 [USP'U]/ML
5000 INJECTION, SOLUTION INTRAVENOUS; SUBCUTANEOUS EVERY 8 HOURS
Status: DISCONTINUED | OUTPATIENT
Start: 2025-02-09 | End: 2025-02-09

## 2025-02-09 RX ORDER — TALC
6 POWDER (GRAM) TOPICAL NIGHTLY PRN
Status: DISCONTINUED | OUTPATIENT
Start: 2025-02-09 | End: 2025-02-11 | Stop reason: HOSPADM

## 2025-02-09 RX ORDER — NIFEDIPINE 30 MG/1
90 TABLET, EXTENDED RELEASE ORAL DAILY
Status: DISCONTINUED | OUTPATIENT
Start: 2025-02-09 | End: 2025-02-11 | Stop reason: HOSPADM

## 2025-02-09 RX ORDER — HYDRALAZINE HYDROCHLORIDE 25 MG/1
25 TABLET, FILM COATED ORAL EVERY 12 HOURS
Status: DISCONTINUED | OUTPATIENT
Start: 2025-02-09 | End: 2025-02-09

## 2025-02-09 RX ORDER — ASPIRIN 81 MG/1
81 TABLET ORAL DAILY
Status: DISCONTINUED | OUTPATIENT
Start: 2025-02-10 | End: 2025-02-11 | Stop reason: HOSPADM

## 2025-02-09 RX ORDER — CLOPIDOGREL BISULFATE 75 MG/1
75 TABLET ORAL DAILY
Status: DISCONTINUED | OUTPATIENT
Start: 2025-02-09 | End: 2025-02-11 | Stop reason: HOSPADM

## 2025-02-09 RX ORDER — IBUPROFEN 200 MG
16 TABLET ORAL
Status: DISCONTINUED | OUTPATIENT
Start: 2025-02-09 | End: 2025-02-11 | Stop reason: HOSPADM

## 2025-02-09 RX ORDER — RANOLAZINE 500 MG/1
500 TABLET, EXTENDED RELEASE ORAL DAILY
Status: DISCONTINUED | OUTPATIENT
Start: 2025-02-09 | End: 2025-02-11 | Stop reason: HOSPADM

## 2025-02-09 RX ORDER — CARVEDILOL 12.5 MG/1
25 TABLET ORAL 2 TIMES DAILY WITH MEALS
Status: DISCONTINUED | OUTPATIENT
Start: 2025-02-09 | End: 2025-02-11 | Stop reason: HOSPADM

## 2025-02-09 RX ORDER — HYDROMORPHONE HYDROCHLORIDE 1 MG/ML
0.5 INJECTION, SOLUTION INTRAMUSCULAR; INTRAVENOUS; SUBCUTANEOUS
Status: COMPLETED | OUTPATIENT
Start: 2025-02-09 | End: 2025-02-09

## 2025-02-09 RX ORDER — ACETAMINOPHEN 325 MG/1
650 TABLET ORAL EVERY 4 HOURS PRN
Status: DISCONTINUED | OUTPATIENT
Start: 2025-02-09 | End: 2025-02-11 | Stop reason: HOSPADM

## 2025-02-09 RX ORDER — SEVELAMER CARBONATE 800 MG/1
1600 TABLET, FILM COATED ORAL
Status: DISCONTINUED | OUTPATIENT
Start: 2025-02-09 | End: 2025-02-11 | Stop reason: HOSPADM

## 2025-02-09 RX ORDER — MINOXIDIL 2.5 MG/1
10 TABLET ORAL 2 TIMES DAILY
Status: DISCONTINUED | OUTPATIENT
Start: 2025-02-09 | End: 2025-02-11 | Stop reason: HOSPADM

## 2025-02-09 RX ORDER — ONDANSETRON HYDROCHLORIDE 2 MG/ML
4 INJECTION, SOLUTION INTRAVENOUS
Status: COMPLETED | OUTPATIENT
Start: 2025-02-09 | End: 2025-02-09

## 2025-02-09 RX ORDER — HEPARIN SODIUM,PORCINE/D5W 25000/250
0-40 INTRAVENOUS SOLUTION INTRAVENOUS CONTINUOUS
Status: DISCONTINUED | OUTPATIENT
Start: 2025-02-09 | End: 2025-02-11 | Stop reason: HOSPADM

## 2025-02-09 RX ORDER — HYDRALAZINE HYDROCHLORIDE 25 MG/1
25 TABLET, FILM COATED ORAL EVERY 8 HOURS
Status: DISCONTINUED | OUTPATIENT
Start: 2025-02-09 | End: 2025-02-09

## 2025-02-09 RX ORDER — ONDANSETRON HYDROCHLORIDE 2 MG/ML
4 INJECTION, SOLUTION INTRAVENOUS EVERY 8 HOURS PRN
Status: DISCONTINUED | OUTPATIENT
Start: 2025-02-09 | End: 2025-02-11 | Stop reason: HOSPADM

## 2025-02-09 RX ORDER — FUROSEMIDE 10 MG/ML
120 INJECTION INTRAMUSCULAR; INTRAVENOUS ONCE
Status: COMPLETED | OUTPATIENT
Start: 2025-02-09 | End: 2025-02-09

## 2025-02-09 RX ORDER — HYDROMORPHONE HYDROCHLORIDE 1 MG/ML
1 INJECTION, SOLUTION INTRAMUSCULAR; INTRAVENOUS; SUBCUTANEOUS
Status: COMPLETED | OUTPATIENT
Start: 2025-02-09 | End: 2025-02-09

## 2025-02-09 RX ORDER — POLYETHYLENE GLYCOL 3350 17 G/17G
17 POWDER, FOR SOLUTION ORAL DAILY
Status: DISCONTINUED | OUTPATIENT
Start: 2025-02-09 | End: 2025-02-11 | Stop reason: HOSPADM

## 2025-02-09 RX ORDER — ATORVASTATIN CALCIUM 40 MG/1
80 TABLET, FILM COATED ORAL DAILY
Status: DISCONTINUED | OUTPATIENT
Start: 2025-02-09 | End: 2025-02-11 | Stop reason: HOSPADM

## 2025-02-09 RX ORDER — ASPIRIN 325 MG
325 TABLET ORAL
Status: COMPLETED | OUTPATIENT
Start: 2025-02-09 | End: 2025-02-09

## 2025-02-09 RX ORDER — ISOSORBIDE MONONITRATE 30 MG/1
120 TABLET, EXTENDED RELEASE ORAL DAILY
Status: DISCONTINUED | OUTPATIENT
Start: 2025-02-09 | End: 2025-02-11 | Stop reason: HOSPADM

## 2025-02-09 RX ORDER — MORPHINE SULFATE 4 MG/ML
6 INJECTION, SOLUTION INTRAMUSCULAR; INTRAVENOUS
Status: COMPLETED | OUTPATIENT
Start: 2025-02-09 | End: 2025-02-09

## 2025-02-09 RX ORDER — GLUCAGON 1 MG
1 KIT INJECTION
Status: DISCONTINUED | OUTPATIENT
Start: 2025-02-09 | End: 2025-02-11 | Stop reason: HOSPADM

## 2025-02-09 RX ADMIN — HYDROMORPHONE HYDROCHLORIDE 0.5 MG: 1 INJECTION, SOLUTION INTRAMUSCULAR; INTRAVENOUS; SUBCUTANEOUS at 07:02

## 2025-02-09 RX ADMIN — MINOXIDIL 10 MG: 2.5 TABLET ORAL at 11:02

## 2025-02-09 RX ADMIN — ONDANSETRON 4 MG: 2 INJECTION INTRAMUSCULAR; INTRAVENOUS at 06:02

## 2025-02-09 RX ADMIN — HYDROMORPHONE HYDROCHLORIDE 1 MG: 1 INJECTION, SOLUTION INTRAMUSCULAR; INTRAVENOUS; SUBCUTANEOUS at 07:02

## 2025-02-09 RX ADMIN — CLOPIDOGREL BISULFATE 75 MG: 75 TABLET ORAL at 11:02

## 2025-02-09 RX ADMIN — ATORVASTATIN CALCIUM 80 MG: 40 TABLET, FILM COATED ORAL at 11:02

## 2025-02-09 RX ADMIN — EZETIMIBE 10 MG: 10 TABLET ORAL at 11:02

## 2025-02-09 RX ADMIN — HYDRALAZINE HYDROCHLORIDE 25 MG: 25 TABLET ORAL at 04:02

## 2025-02-09 RX ADMIN — MORPHINE SULFATE 6 MG: 4 INJECTION, SOLUTION INTRAMUSCULAR; INTRAVENOUS at 06:02

## 2025-02-09 RX ADMIN — FUROSEMIDE 120 MG: 10 INJECTION, SOLUTION INTRAMUSCULAR; INTRAVENOUS at 04:02

## 2025-02-09 RX ADMIN — CARVEDILOL 25 MG: 12.5 TABLET, FILM COATED ORAL at 04:02

## 2025-02-09 RX ADMIN — ISOSORBIDE MONONITRATE 120 MG: 30 TABLET, EXTENDED RELEASE ORAL at 11:02

## 2025-02-09 RX ADMIN — RANOLAZINE 500 MG: 500 TABLET, FILM COATED, EXTENDED RELEASE ORAL at 04:02

## 2025-02-09 RX ADMIN — HEPARIN SODIUM 5000 UNITS: 5000 INJECTION INTRAVENOUS; SUBCUTANEOUS at 02:02

## 2025-02-09 RX ADMIN — ASPIRIN 325 MG ORAL TABLET 325 MG: 325 PILL ORAL at 06:02

## 2025-02-09 RX ADMIN — HEPARIN SODIUM 12 UNITS/KG/HR: 10000 INJECTION, SOLUTION INTRAVENOUS at 06:02

## 2025-02-09 RX ADMIN — NIFEDIPINE 90 MG: 30 TABLET, FILM COATED, EXTENDED RELEASE ORAL at 11:02

## 2025-02-09 RX ADMIN — SEVELAMER CARBONATE 1600 MG: 800 TABLET, FILM COATED ORAL at 04:02

## 2025-02-09 NOTE — ASSESSMENT & PLAN NOTE
Patient has extensive coronary artery disease involving proximal/distal LAD and RCA ( with left-to-right collaterals filling distal RCA).  Recent PET scan (10/2024) showed transmural scar in distal LAD territory and in Lcx/ posterolateral branch territory.  Coronary angiography from 11/2023 reviewed.  He does have a significant calcific lesion in mid left circumflex artery as well.  As area of transmural scarring involves distal left LAD and distal left circumflex artery, there may be a utility of revascularization of proximal LAD.    Continue with maximal medical therapy  Continue aspirin 81 mg, Plavix 75 mg and high-intensity statin along with Zetia 10 mg daily  Continue with Ranexa 500 mg b.i.d. (can not up titrate to 1 g b.i.d. given end-stage renal disease)    Continue to trend troponin until peak is reached.  Start heparin drip  Check echocardiogram  Make him NPO past midnight in case revascularization is planned (after discussion with Interventional Cardiology at [facility])

## 2025-02-09 NOTE — ASSESSMENT & PLAN NOTE
Anemia is likely due to chronic disease due to ESRD. Most recent hemoglobin and hematocrit are listed below.  Recent Labs     02/09/25  0531   HGB 11.9*   HCT 38.7*     Plan  - Monitor serial CBC: Daily  - Transfuse PRBC if patient becomes hemodynamically unstable, symptomatic or H/H drops below 7/21.  - Patient has not received any PRBC transfusions to date  - Patient's anemia is currently stable

## 2025-02-09 NOTE — ED NOTES
Pt sitting up in bed, states pain to LUE is 10/10, no relief from IV dilaudid.  Pt denies chest pain at this time, denies SOB.  Pt VSS.  AV fistula to R forearm, thrill/bruit present.  Pt gets routine HD M/W/F, last HD Friday.  Pt skin warm/dry, afebrile.  Bed low/locked, siderails upx2, pt agrees to plan of care.

## 2025-02-09 NOTE — Clinical Note
The catheter was inserted into the, was repositioned into the and was inserted over the wire into the ostium   right coronary artery. An angiography was performed of the right coronary arteries. Multiple views were taken. The angiography was performed via power injection.   And removed.

## 2025-02-09 NOTE — ASSESSMENT & PLAN NOTE
Blood pressure still not optimally controlled  Start hydralazine 25 mg t.i.d.  Continue carvedilol 25 mg b.i.d.  Continue nifedipine 90 mg daily  Continue Imdur 120 mg daily  Continue minoxidil 10 mg b.i.d.

## 2025-02-09 NOTE — HPI
Haroldo Dickinson is a.49 y.o. with a pmh of hypertension, hyperlipidemia, ESRD on HD (MWF), 3V CAD s/p PCI [facility]ILL[facility] of RCA in 2020 with [facility] in 2023 which shows  of % with mid LAD 90% and proximal LAD 75% stenosed presents to the hospital with complaints of substernal chest pain late last night while watching TV.  Patient describes his chest pain as my heart was racing fast.  Also associates some mild left arm weakness.  Patient states these symptoms lasted for just a short while and resolved on its own. Patient is on dialysis for ESRD and was last dialyzed on 02/07/2025 at [facility].  Per chart review, patient was at [facility] from 12/27/24 to 12/30/24 for similar complaints and admitted for NSTEMI which was secondary to demand from hypertensive emergency and hypervolemia in the setting of ESRD.  Per CTS note, there would be negligible benefit of surgical revascularization given fixed perfusion abnormality/transmural scarring on recent cardiac PET stress test and recommended continued medical management.  Patient denies any other alleviating exacerbating factors.  Patient denies headache, blurry vision, shortness on breath, nausea, vomiting, lower extremity swelling, hematemesis, hematochezia, hematuria, or any other associated symptoms.    In the ED: Patient afebrile without leukocytosis, hypertensive on presentation 150/78 otherwise hemodynamically stable, initial troponin 0.207, , CMP consistent with ESRD, COVID influenza negative, chest x-ray shows no acute cardiopulmonary process.  EKG in ED NSR and no STEMI. Patient given aspirin 325 mg, IV Dilaudid 0.5 mg, IV Dilaudid 1 mg, IV morphine 6 mg, and IV Zofran 4 mg in the ED. Patient placed under observation for further medical management.

## 2025-02-09 NOTE — CONSULTS
[facility] Emergency Dept  Cardiology  Consult Note    Patient Name: Haroldo Dickinson  MRN: [id number]  Admission Date: 2/9/2025  Hospital Length of Stay: 0 days  Code Status: Full Code   Attending Provider: Makenzie Germain MD   Consulting Provider: Ajith Hutchins MD  Primary Care Physician: Mireya Larose MD  Principal Problem:Coronary artery disease of native artery with stable angina pectoris    Patient information was obtained from ER records.     Inpatient consult to Cardiology  Consult performed by: Ajith Hutchins MD  Consult ordered by: Tawny Pedraza PA-C        Subjective:     Chief Complaint:  Chest pain    HPI:   Mr. Dickinson is a 48 yo man with PMHx of extensive coronary artery disease (HILLARY to mid RCA in 2020 but now , mid/prox-severe LAD disease not feasible to revascularization as PET scan showed transmural scar in distal LAD and posterolateral branch territory), ischemic cardiomyopathy with now recovered EF, end-stage renal disease on hemodialysis, prior CVA with right-sided residual weakness, renal cell cancer status post bilateral nephrectomy, hypertension and hyperlipidemia who was admitted to the hospital for evaluation of chest pain.    Cardiology is consulted for further management.    He follows up with Cardiology at [facility].    Of note, he had similar presentation at [facility] in last week of December, 2024 due to hypertensive emergency.  At that time medical management was recommended.    He said, while he was watching TV yesterday afternoon, he started to have substernal chest pain.  It was also associated with mild palpitations and shortness of breath.  Due to persistent pain, he came to the ED.    This afternoon, when I saw him in the ED, his chest pain has been improved.    CT surgery has evaluated him in the past and they recommended medical management as surgery would be of limited benefit given transmural scarring on PET scan.    Past Medical History:    Diagnosis Date    CAD (coronary artery disease), native coronary artery 11/21/2019    Cataract     Diabetes mellitus     Diabetic retinopathy     Dialysis patient     DM type 2 causing renal disease, not at goal     ESRD (end stage renal disease) started dialysis 01/2014 06/05/2014    History of colon polyps 02/10/2021    History of COVID-19 12/29/2022    Hyperparathyroidism, secondary renal 06/05/2014    Hypertension     NSTEMI (non-ST elevated myocardial infarction) 12/21/2013    Organ transplant candidate 06/05/2014    Palliative care encounter 10/29/2024    Pleural effusion 06/07/2024    Pneumonia     Post PTCA 08/26/2020    RCA HILLARY 7-25-20    Renal cell carcinoma of right kidney     Renal hypertension     Stroke        Past Surgical History:   Procedure Laterality Date    ANGIOGRAM, CORONARY, WITH LEFT HEART CATHETERIZATION N/A 7/1/2021    Procedure: Angiogram, Coronary, with Left Heart Cath;  Surgeon: Miki Zendejas MD;  Location: [facility] CATH LAB;  Service: Cardiology;  Laterality: N/A;    ANGIOGRAM, CORONARY, WITH LEFT HEART CATHETERIZATION N/A 11/28/2023    Procedure: Angiogram, Coronary, with Left Heart Cath;  Surgeon: Noah Pendleton MD;  Location: [facility] CATH LAB;  Service: Cardiology;  Laterality: N/A;    COLONOSCOPY N/A 5/3/2017    Procedure: COLONOSCOPY;  Surgeon: Rufino Carpenter MD;  Location: [facility] (4TH FLR);  Service: Endoscopy;  Laterality: N/A;  pt states can only schedule on Wednesdays    COLONOSCOPY N/A 2/9/2021    Procedure: COLONOSCOPY;  Surgeon: Edil Wong MD;  Location: [facility] (4TH FLR);  Service: Endoscopy;  Laterality: N/A;  Dialysis MWF/ labwork day of procedure  right arm aceess  per Dr. Colin pt can hold Plavix 5 days prior see note- sm  COVID test on 2/6/21 at [facility] - sm    COLONOSCOPY N/A 2/10/2021    Procedure: COLONOSCOPY;  Surgeon: Robert Ayers MD;  Location: [facility] (4TH FLR);  Service: Endoscopy;  Laterality: N/A;  rescheduled due to poor bowel  prep-BB  negative covid screening 2/6/21-BB  dialysis M-W-F-BB  okay to r/s for 2/9/21 and to hold Plavix per Dr. KIRAN Wong-BB  labs same day-BB    CORONARY STENT PLACEMENT N/A 7/25/2019    Procedure: INSERTION, STENT, CORONARY ARTERY;  Surgeon: Miki Zendejas MD;  Location: [facility] CATH LAB;  Service: Cardiology;  Laterality: N/A;    DIALYSIS FISTULA CREATION      FISTULOGRAM N/A 2/18/2019    Procedure: Fistulogram;  Surgeon: Yaneth De La Cruz MD;  Location: [facility] CATH LAB;  Service: Cardiology;  Laterality: N/A;    FISTULOGRAM Right 7/23/2019    Procedure: Fistulogram;  Surgeon: Oz Cordoba MD;  Location: [facility] CATH LAB;  Service: Cardiology;  Laterality: Right;    LAPAROSCOPIC ROBOT-ASSISTED SURGICAL REMOVAL OF KIDNEY USING DA JAIME XI Right 5/19/2022    Procedure: XI ROBOTIC NEPHRECTOMY;  Surgeon: Aidan Hendricks MD;  [address]  Service: Urology;  Laterality: Right;  3hrs    LAPAROSCOPIC ROBOT-ASSISTED SURGICAL REMOVAL OF KIDNEY USING DA JAIME XI Left 1/5/2023    Procedure: XI ROBOTIC NEPHRECTOMY;  Surgeon: Aidan Hendricks MD;  [address];  Service: Urology;  Laterality: Left;  4 hrs    LEFT HEART CATHETERIZATION Left 7/25/2019    Procedure: Left heart cath;  Surgeon: Miki Zendejas MD;  Location: [facility] CATH LAB;  Service: Cardiology;  Laterality: Left;    REPAIR  1/5/2023    Procedure: REPAIR; COLOTOMY;  Surgeon: Maikel Lamb MD;  [address];  Service: General;;    RETINAL LASER PROCEDURE Bilateral 2018 or 2017    Dr. Rothman    VASCULAR SURGERY         Review of patient's allergies indicates:   Allergen Reactions    No known allergies        Current Facility-Administered Medications on File Prior to Encounter   Medication    lidocaine (PF) 10 mg/ml (1%) injection 10 mg     Current Outpatient Medications on File Prior to Encounter   Medication Sig    aspirin (ECOTRIN) 81 MG EC tablet Take 1 tablet (81 mg total) by mouth once daily.     atorvastatin (LIPITOR) 80 MG tablet Take 1 tablet (80 mg total) by mouth once daily.    carvediloL (COREG) 25 MG tablet Take 1 tablet (25 mg total) by mouth 2 (two) times daily with meals.    ciclopirox (PENLAC) 8 % Soln Apply topically nightly.    clopidogreL (PLAVIX) 75 mg tablet Take 1 tablet (75 mg total) by mouth once daily.    epoetin philip (PROCRIT) 4,000 unit/mL injection Inject 1.93 mLs (7,720 Units total) into the skin every Mon, Wed, Fri.    ezetimibe (ZETIA) 10 mg tablet Take 1 tablet (10 mg total) by mouth once daily.    isosorbide mononitrate (IMDUR) 120 MG 24 hr tablet Take 1 tablet (120 mg total) by mouth once daily.    methoxy peg-epoetin beta (MIRCERA INJ) 150 mcg.    minoxidiL (LONITEN) 10 MG Tab Take 1 tablet (10 mg total) by mouth 2 (two) times daily.    NIFEdipine (PROCARDIA-XL) 90 MG (OSM) 24 hr tablet Take 1 tablet (90 mg total) by mouth once daily.    nitroGLYCERIN (NITROSTAT) 0.4 MG SL tablet Place 1 tablet (0.4 mg total) under the tongue every 5 (five) minutes as needed for Chest pain.    polyethylene glycol (GLYCOLAX) 17 gram/dose powder Take 17 g by mouth daily as needed for Constipation.    ranolazine (RANEXA) 500 MG Tb12 Take 1 tablet (500 mg total) by mouth Daily.    sevelamer carbonate (RENVELA) 800 mg Tab Take 2 tablets (1,600 mg total) by mouth 3 (three) times daily with meals.    vit B complex-C-folic ac-zinc (RENAPLEX) 800 mcg- 12.5 mg Tab Take 1 tablet by mouth.     Family History       Problem Relation (Age of Onset)    Cancer Maternal Grandfather    Cataracts Mother    Diabetes Mother    Heart disease Brother    Hypertension Mother, Father, Sister, Brother    Kidney disease Brother          Tobacco Use    Smoking status: Never    Smokeless tobacco: Never   Substance and Sexual Activity    Alcohol use: Not Currently     Comment: One drink a month    Drug use: No    Sexual activity: Yes     Partners: Female     Birth control/protection: None     Review of Systems    Cardiovascular:  Positive for chest pain and dyspnea on exertion. Negative for claudication, irregular heartbeat, leg swelling, near-syncope, orthopnea, palpitations, paroxysmal nocturnal dyspnea and syncope.   Respiratory:  Negative for cough, shortness of breath, snoring and sputum production.    Gastrointestinal:  Negative for abdominal pain, dysphagia, heartburn, nausea and vomiting.   Neurological:  Negative for dizziness, headaches, loss of balance and weakness.     Objective:     Vital Signs (Most Recent):  Temp: 98.3 °F (36.8 °C) (02/09/25 1040)  Pulse: 79 (02/09/25 1440)  Resp: 19 (02/09/25 1440)  BP: (!) 157/81 (02/09/25 1400)  SpO2: (!) 91 % (02/09/25 1440) Vital Signs (24h Range):  Temp:  [98 °F (36.7 °C)-98.3 °F (36.8 °C)] 98.3 °F (36.8 °C)  Pulse:  [] 79  Resp:  [12-25] 19  SpO2:  [85 %-100 %] 91 %  BP: (150-183)/(78-98) 157/81     Weight: 69.4 kg (153 lb)  Body mass index is 23.96 kg/m².    SpO2: (!) 91 %       No intake or output data in the 24 hours ending 02/09/25 1500    Lines/Drains/Airways       Peripheral Intravenous Line  Duration                  Hemodialysis AV Fistula Right forearm -- days         Peripheral IV - Single Lumen 02/09/25 0706 20 G No Left Antecubital <1 day                     Physical Exam  HENT:      Head: Normocephalic and atraumatic.      Mouth/Throat:      Mouth: Mucous membranes are moist.   Eyes:      Extraocular Movements: Extraocular movements intact.      Pupils: Pupils are equal, round, and reactive to light.   Cardiovascular:      Rate and Rhythm: Normal rate and regular rhythm.      Pulses: Normal pulses.      Heart sounds: Normal heart sounds.   Pulmonary:      Effort: Pulmonary effort is normal.      Breath sounds: Normal breath sounds.   Abdominal:      General: Bowel sounds are normal.      Palpations: Abdomen is soft.   Musculoskeletal:      Left lower leg: No edema.   Skin:     General: Skin is warm.   Neurological:      General: No focal deficit  present.      Mental Status: He is alert.   Psychiatric:         Mood and Affect: Mood normal.         Behavior: Behavior normal.          Current Medications:   [START ON 2/10/2025] aspirin  81 mg Oral Daily    atorvastatin  80 mg Oral Daily    carvediloL  25 mg Oral BID WM    clopidogreL  75 mg Oral Daily    ezetimibe  10 mg Oral Daily    heparin (porcine)  5,000 Units Subcutaneous Q8H    isosorbide mononitrate  120 mg Oral Daily    minoxidiL  10 mg Oral BID    NIFEdipine  90 mg Oral Daily    polyethylene glycol  17 g Oral Daily    ranolazine  500 mg Oral Daily    sevelamer carbonate  1,600 mg Oral TID WM         Current Facility-Administered Medications:     acetaminophen, 650 mg, Oral, Q4H PRN    dextrose 50%, 12.5 g, Intravenous, PRN    dextrose 50%, 25 g, Intravenous, PRN    glucagon (human recombinant), 1 mg, Intramuscular, PRN    glucose, 16 g, Oral, PRN    glucose, 24 g, Oral, PRN    melatonin, 6 mg, Oral, Nightly PRN    naloxone, 0.02 mg, Intravenous, PRN    ondansetron, 4 mg, Intravenous, Q8H PRN    sodium chloride 0.9%, 10 mL, Intravenous, Q12H PRN    Laboratory (all labs reviewed):  CBC:  Recent Labs   Lab 12/27/24  1909 12/28/24  0340 12/29/24  0506 12/30/24  0333 01/02/25  0000 01/15/25  0000 01/20/25  0000 01/29/25  0000 02/05/25  0000 02/09/25  0531   WBC 10.03 10.18 11.29 8.95  --   --   --   --   --  9.19   Hemoglobin 11.0 L 10.7 L 10.4 L 9.5 L   < > 9.7 L 10.4 L 10.7 L 10.7 L 11.9 L   Hematocrit 34.4 L 32.7 L 31.3 L 28.9 L  --   --   --   --   --  38.7 L   Platelets 261 288 305 334  --   --   --   --   --  287    < > = values in this interval not displayed.       CHEMISTRIES:  Recent Labs   Lab 05/19/22  1123 05/19/22  1752 05/20/22  0754 06/13/22  1702 06/14/22  0449 11/30/22  0951 11/02/24  0722 12/27/24  0356 12/28/24  0340 12/29/24  0506 12/30/24  0333 02/09/25  0531   Glucose 93 178 H 97 85 94   < > 87 168 H 130 H 126 H 114 H 177 H   Sodium 140 137 138 140 133 L   < > 132 L 142 135 L 130 L  129 L 138   Potassium 3.8 4.4 4.7 4.1 4.2   < > 4.5 4.1 4.1 4.2 4.2 4.5   BUN 29 H 32 H 44 H 46 H 29 H   < > 43 H 68 H 41 H 61 H 72 H 52 H   Blood Urea Nitrogen  --   --   --   --   --    < >  --   --   --   --   --   --    Creatinine 8.9 H 9.3 H 11.0 H 12.8 H 8.5 H   < > 9.2 H 11.9 H 9.3 H 11.1 H 14.1 H 9.8 H   eGFR if non  6.5 A 6.1 A 5.0 A 4.2 A 6.9 A  --   --   --   --   --   --   --    eGFR  --   --   --   --   --    < > 6.5 A 4.8 A 6.4 A 5.2 A 3.9 A 6 A   Calcium 8.5 L 8.3 L 8.8 8.9 9.1   < > 9.3 9.3 9.1 8.6 L 8.3 L 9.7   Magnesium 2.0  --  2.0 2.0 2.1   < > 2.0  --  1.8 1.9 2.0 2.1    < > = values in this interval not displayed.       CARDIAC BIOMARKERS:  Recent Labs   Lab 10/30/24  2301 10/31/24  0130 10/31/24  0543 02/09/25  0531 02/09/25  1023   Troponin I 1.461 H 1.576 H 1.401 H 0.207 H 0.780 H       COAGS:  Recent Labs   Lab 03/11/24  0629 06/07/24  0756 10/30/24  2301 12/27/24  1909 02/09/25  0531   INR 1.1 1.0 1.1 1.0 1.1       LIPIDS/LFTS:  Recent Labs   Lab 05/02/23  0324 11/27/23  0340 03/11/24  1812 03/12/24  0603 09/02/24  1748 09/02/24  2028 12/27/24  0356 12/28/24  0340 12/29/24  0506 12/30/24  0333 02/09/25  0531   Cholesterol 144  --  96 L  --  131  --   --   --   --   --   --    Triglycerides 94  --  57  --  83  --   --   --   --   --   --    HDL 51  --  51  --  67  --   --   --   --   --   --    LDL Cholesterol 74.2  --  33.6 L  --  47.4 L  --   --   --   --   --   --    Non-HDL Cholesterol 93  --  45  --  64  --   --   --   --   --   --    AST  --    < >  --    < >  --    < > 11 21 18 13 11   ALT  --    < >  --    < >  --    < > 8 L 9 L 7 L 5 L 9 L    < > = values in this interval not displayed.       BNP:  Recent Labs   Lab 09/02/24  1748 10/28/24  0330 10/30/24  1253 12/27/24  0356 02/09/25  0531   BNP 1,956 H 817 H 400 H 1,322 H 610 H       TSH:  Recent Labs   Lab 02/09/25  0531   TSH 1.677       Free T4:        Diagnostic Results:  ECG (personally reviewed and  interpreted tracing(s)):  EKG done on 9th February, 2025 showed sinus rhythm, LVH with repolarization abnormalities    Chest X-Ray (personally reviewed and interpreted image(s)):  Mildly Increased interstitial prominence    Echo: 12/2024    Left Ventricle: The left ventricle is normal in size. Increased ventricular mass. Moderately increased wall thickness. There is moderate concentric hypertrophy. Regional wall motion abnormalities present. There is low normal systolic function with a visually estimated ejection fraction of 50 - 55%. Grade II diastolic dysfunction.    Right Ventricle: Normal right ventricular cavity size. Wall thickness is normal. Systolic function is normal.    The left atrium is severely dilated.    Aortic Valve: The aortic valve is a trileaflet valve. There is moderate aortic valve sclerosis. Mildly restricted motion.    Mitral Valve: There is moderate posterior mitral annular calcification present. There is mild regurgitation.    Pulmonary Artery: The estimated pulmonary artery systolic pressure is >25 mmHg.    IVC/SVC: The IVC was not visualized.    Pericardium: There is a small anterior effusion.    Stress Test: PET scan 10/2024    There are two significant perfusion abnormalities.    Perfusion Abnormality #1 - There is a large sized, severe intensity distal inferior and apical fixed perfusion abnormality involving 20% of LV myocardium in the distribution of the distal LAD territory consistent with a transmural scar.    Within the perfusion abnormality, absolute myocardial perfusion (cc/min/gm) averaged 0.94 cc/min/g at rest, 0.35 cc/min/g at stress, and CFR was 1.06, which equates to severely reduced coronary flow capacity within the LAD territory.    Perfusion Abnormality #2 - There is a small sized, moderate intensity basal inferior and inferolateral fixed perfusion abnormality involving 10% of LV myocardium in the distribution of the PLB/LCX territory consistent with a transmural scar.     Within the perfusion abnormality, absolute myocardial perfusion (cc/min/gm) averaged 0.37 cc/min/g at rest, 0.39 cc/min/g at stress, and CFR was 1.05, which equates to severely reduced coronary flow capacity within the RCA territory.    There are no other significant perfusion abnormalities.    CT attenuation images demonstrate severe diffuse coronary calcifications in the LAD, LCX and RCA territory and moderate diffuse aortic calcifications in the descending aorta.    The gated perfusion images showed an ejection fraction of 38% at rest and 36% during stress. A normal ejection fraction is greater than 47%.    There is regional akinesis at rest of the basal inferior and inferolateral wall(s) and regional akinesis during stress of the basal inferior and inferolateral wall(s). There is regional akinesis at rest of the apical wall(s) and regional akinesis during stress of the apical wall(s).    The study's ECG is uninterpretable.    When compared to a prior study from 6/15/2021, there are significant changes.  There is now a fixed defect in the apex and basal inferior/inferolateral walls. The reversible perfusion abnormality in the inferior wall is no longer present. There has been progression of CAD.    Cath: 11/2023    There was three vessel coronary artery disease.    The Mid LAD lesion was 90% stenosed.    The Mid RCA lesion was 100% stenosed.    The Prox LAD lesion was 75% stenosed.    The pre-procedure left ventricular end diastolic pressure was 26.    There was diastolic dysfunction.    Interval worsening of mid LAD disease. Given diffuse nature of CAD will recommend medcial therapy and HD optimization to improve left sided filling pressures  Assessment and Plan:     * Coronary artery disease of native artery with stable angina pectoris  Patient has extensive coronary artery disease involving proximal/distal LAD and RCA ( with left-to-right collaterals filling distal RCA).  Recent PET scan (10/2024) showed  transmural scar in distal LAD territory and in Lcx/ posterolateral branch territory.  Coronary angiography from 11/2023 reviewed.  He does have a significant calcific lesion in mid left circumflex artery as well.  As area of transmural scarring involves distal left LAD and distal left circumflex artery, there may be a utility of revascularization of proximal LAD.    Continue with maximal medical therapy  Continue aspirin 81 mg, Plavix 75 mg and high-intensity statin along with Zetia 10 mg daily  Continue with Ranexa 500 mg b.i.d. (can not up titrate to 1 g b.i.d. given end-stage renal disease)    Continue to trend troponin until peak is reached.  Start heparin drip  Check echocardiogram  Make him NPO past midnight in case revascularization is planned (after discussion with Interventional Cardiology at [facility])    Essential hypertension  Blood pressure still not optimally controlled  Start hydralazine 25 mg t.i.d.  Continue carvedilol 25 mg b.i.d.  Continue nifedipine 90 mg daily  Continue Imdur 120 mg daily  Continue minoxidil 10 mg b.i.d.    ESRD on hemodialysis  Hemodialysis per Nephrology        VTE Risk Mitigation (From admission, onward)           Ordered     heparin (porcine) injection 5,000 Units  Every 8 hours         02/09/25 0821     IP VTE HIGH RISK PATIENT  Once         02/09/25 0821     Place sequential compression device  Until discontinued         02/09/25 0821                    Ajith Hutchins MD  Cardiology   [facility] [place] - Emergency Dept

## 2025-02-09 NOTE — ASSESSMENT & PLAN NOTE
Patient presents with left-sided chest pain/palpitations which started late last night while watching TV. Patient endorse left arm weakness with no other associated symptoms. Patient states that his chest issue is more of his heart racing which was concerning for him.    Patient with a history of 3V CAD s/p PCI HILLARY of RCA in 2020 and  of RCA seen on [facility] in 2023 along with 90% stenosis of paroxysmal LAD and 75% stenosis of mid LAD.  Was recently admitted to [facility] and evaluated by Cardiothoracic surgery.  Per CTS, there would be negligible benefit of surgical revascularization due to fixed perfusion abnormality/transmural scarring seen on cardiac PT stress test and recommended continued medical management.    Initial troponin 0.207 (similar to previous), EKG NSR and no STEMI, previous echo with EF 50-55% and grade II DD  Euvolemic on exam and stable angina secondary to 3V CAD    Plan:  Continuous cardiac monitoring  Trend troponin   Continue medication rx   NPO for now until cardiology evaluation  Cardiology consulted and would appreciate recommendations

## 2025-02-09 NOTE — ASSESSMENT & PLAN NOTE
Patient presents with left-sided chest pain/palpitations which started late last night while watching TV. Patient endorse left arm weakness with no other associated symptoms. Patient states that his chest issue is more of his heart racing which was concerning for him.    Patient with a history of 3V CAD s/p PCI HILLARY of RCA in 2020 and  of RCA seen on [facility] in 2023 along with 90% stenosis of paroxysmal LAD and 75% stenosis of mid LAD.  Was recently admitted to [facility] and evaluated by Cardiothoracic surgery.  Per CTS, there would be negligible benefit of surgical revascularization due to fixed perfusion abnormality/transmural scarring seen on cardiac PT stress test and recommended continued medical management.    Initial troponin 0.207 (similar to previous), EKG NSR and no STEMI, previous echo with EF 50-55% and grade II DD  Euvolemic on exam and stable angina secondary to 3V CAD    Plan:  Cards onboard   Addendum:   Continue maximal medication therapy with aspirin 81 mg, Plavix 75 mg and high-intensity statin along with Zetia 10 mg daily.  Continue to trend troponin until peak is reached   Patient is started on heparin drip and made NPO past midnight in case revascularization is planned.  Cardiology stated they will discuss with Interventional Cardiology at [facility].  Telemetry monitoring

## 2025-02-09 NOTE — HPI
Mr. Dickinson is a 48 yo man with PMHx of extensive coronary artery disease (HILLARY to mid RCA in 2020 but now , mid/prox-severe LAD disease not feasible to revascularization as PET scan showed transmural scar in distal LAD and posterolateral branch territory), ischemic cardiomyopathy with now recovered EF, end-stage renal disease on hemodialysis, prior CVA with right-sided residual weakness, renal cell cancer status post bilateral nephrectomy, hypertension and hyperlipidemia who was admitted to the hospital for evaluation of chest pain.    Cardiology is consulted for further management.    He follows up with Cardiology at [facility].    Of note, he had similar presentation at [facility] in last week of December, 2024 due to hypertensive emergency.  At that time medical management was recommended.    He said, while he was watching TV yesterday afternoon, he started to have substernal chest pain.  It was also associated with mild palpitations and shortness of breath.  Due to persistent pain, he came to the ED.    This afternoon, when I saw him in the ED, his chest pain has been improved.    CT surgery has evaluated him in the past and they recommended medical management as surgery would be of limited benefit given transmural scarring on PET scan.

## 2025-02-09 NOTE — ASSESSMENT & PLAN NOTE
Creatine stable for now. BMP reviewed- noted CrCl cannot be calculated (Unknown ideal weight.). according to latest data. Based on current GFR, CKD stage is end stage.  Monitor UOP and serial BMP and adjust therapy as needed. Renally dose meds. Avoid nephrotoxic medications and procedures.    Continue HD at [facility]

## 2025-02-09 NOTE — ASSESSMENT & PLAN NOTE
"Patient's FSGs are controlled on current medication regimen.  Last A1c reviewed-   Lab Results   Component Value Date    HGBA1C 5.1 01/08/2025     Most recent fingerstick glucose reviewed- No results for input(s): "POCTGLUCOSE" in the last 24 hours.  Current correctional scale   None  Maintain anti-hyperglycemic dose as follows-   Antihyperglycemics (From admission, onward)      None          Patient is not on diabetic medications at home therefore we will hold off on starting sliding scale insulin  "

## 2025-02-09 NOTE — H&P
[facility]  [facility]  History & Physical    Patient Name: Haroldo Dickinson  MRN: [id number]  Patient Class: OP- Observation  Admission Date: 2/9/2025  Attending Physician: Makenzie Germain MD   Primary Care Provider: Mireya Larose MD         Patient information was obtained from patient, past medical records, and ER records.     Subjective:     Principal Problem:Coronary artery disease of native artery with stable angina pectoris    Chief Complaint:   Chief Complaint   Patient presents with    Chest Pain     Came in via EMS reporting cp x  hours ago while watching tv. Denies SOB. Dialysis MWF. Last dialyzed Friday.        HPI: Haroldo Dickinson is a.49 y.o. with a pmh of hypertension, hyperlipidemia, ESRD on HD (MWF), 3V CAD s/p PCI HILLARY of RCA in 2020 with [facility] in 2023 which shows  of % with mid LAD 90% and proximal LAD 75% stenosed presents to the hospital with complaints of substernal chest pain late last night while watching TV.  Patient describes his chest pain as my heart was racing fast.  Also associates some mild left arm weakness.  Patient states these symptoms lasted for just a short while and resolved on its own. Patient is on dialysis for ESRD and was last dialyzed on 02/07/2025 at [facility].  Per chart review, patient was at [facility] from 12/27/24 to 12/30/24 for similar complaints and admitted for NSTEMI which was secondary to demand from hypertensive emergency and hypervolemia in the setting of ESRD.  Per CTS note, there would be negligible benefit of surgical revascularization given fixed perfusion abnormality/transmural scarring on recent cardiac PET stress test and recommended continued medical management.  Patient denies any other alleviating exacerbating factors.  Patient denies headache, blurry vision, shortness on breath, nausea, vomiting, lower extremity swelling, hematemesis, hematochezia, hematuria, or any other associated symptoms.    In the ED: Patient afebrile without  leukocytosis, hypertensive on presentation 150/78 otherwise hemodynamically stable, initial troponin 0.207, , CMP consistent with ESRD, COVID influenza negative, chest x-ray shows no acute cardiopulmonary process.  EKG in ED NSR and no STEMI. Patient given aspirin 325 mg, IV Dilaudid 0.5 mg, IV Dilaudid 1 mg, IV morphine 6 mg, and IV Zofran 4 mg in the ED. Patient placed under observation for further medical management.    Past Medical History:   Diagnosis Date    CAD (coronary artery disease), native coronary artery 11/21/2019    Cataract     Diabetes mellitus     Diabetic retinopathy     Dialysis patient     DM type 2 causing renal disease, not at goal     ESRD (end stage renal disease) started dialysis 01/2014 06/05/2014    History of colon polyps 02/10/2021    History of COVID-19 12/29/2022    Hyperparathyroidism, secondary renal 06/05/2014    Hypertension     NSTEMI (non-ST elevated myocardial infarction) 12/21/2013    Organ transplant candidate 06/05/2014    Palliative care encounter 10/29/2024    Pleural effusion 06/07/2024    Pneumonia     Post PTCA 08/26/2020    RCA HILLARY 7-25-20    Renal cell carcinoma of right kidney     Renal hypertension     Stroke        Past Surgical History:   Procedure Laterality Date    ANGIOGRAM, CORONARY, WITH LEFT HEART CATHETERIZATION N/A 7/1/2021    Procedure: Angiogram, Coronary, with Left Heart Cath;  Surgeon: Miki Zendejas MD;  Location: [facility] CATH LAB;  Service: Cardiology;  Laterality: N/A;    ANGIOGRAM, CORONARY, WITH LEFT HEART CATHETERIZATION N/A 11/28/2023    Procedure: Angiogram, Coronary, with Left Heart Cath;  Surgeon: Noah Pendleton MD;  Location: [facility] CATH LAB;  Service: Cardiology;  Laterality: N/A;    COLONOSCOPY N/A 5/3/2017    Procedure: COLONOSCOPY;  Surgeon: Rufino Carpenter MD;  Location: [facility] ([address] [place] [address]);  Service: Endoscopy;  Laterality: N/A;   states can only schedule on Wednesdays    COLONOSCOPY N/A 2/9/2021    Procedure: COLONOSCOPY;   Surgeon: Edil Wong MD;  Location: [facility] ENDO (4TH FLR);  Service: Endoscopy;  Laterality: N/A;  Dialysis MWF/ labwork day of procedure  right arm aceess  per Dr. Colin pt can hold Plavix 5 days prior see note- sm  COVID test on 2/6/21 at [facility] - sm    COLONOSCOPY N/A 2/10/2021    Procedure: COLONOSCOPY;  Surgeon: Robert Ayers MD;  Location: [facility] ENDO (4TH FLR);  Service: Endoscopy;  Laterality: N/A;  rescheduled due to poor bowel prep-BB  negative covid screening 2/6/21-BB  dialysis M-W-F-BB  okay to r/s for 2/9/21 and to hold Plavix per Dr. KIRAN Wong-BB  labs same day-BB    CORONARY STENT PLACEMENT N/A 7/25/2019    Procedure: INSERTION, STENT, CORONARY ARTERY;  Surgeon: Miki Zendejas MD;  Location: [facility] CATH LAB;  Service: Cardiology;  Laterality: N/A;    DIALYSIS FISTULA CREATION      FISTULOGRAM N/A 2/18/2019    Procedure: Fistulogram;  Surgeon: Yaneth De La Cruz MD;  Location: [facility] CATH LAB;  Service: Cardiology;  Laterality: N/A;    FISTULOGRAM Right 7/23/2019    Procedure: Fistulogram;  Surgeon: Oz Cordoba MD;  Location: [facility] CATH LAB;  Service: Cardiology;  Laterality: Right;    LAPAROSCOPIC ROBOT-ASSISTED SURGICAL REMOVAL OF KIDNEY USING DA JAIME XI Right 5/19/2022    Procedure: XI ROBOTIC NEPHRECTOMY;  Surgeon: Aidan Hendricks MD;  Location: [address];  Service: Urology;  Laterality: Right;  3hrs    LAPAROSCOPIC ROBOT-ASSISTED SURGICAL REMOVAL OF KIDNEY USING DA JAIME XI Left 1/5/2023    Procedure: XI ROBOTIC NEPHRECTOMY;  Surgeon: Aidan Hendricks MD;  [address]  Service: Urology;  Laterality: Left;  4 hrs    LEFT HEART CATHETERIZATION Left 7/25/2019    Procedure: Left heart cath;  Surgeon: Miki Zendejas MD;  Location: [facility] CATH LAB;  Service: Cardiology;  Laterality: Left;    REPAIR  1/5/2023    Procedure: REPAIR; COLOTOMY;  Surgeon: Maikel Lamb MD;  Location: [facility] OR 2ND FLR;  Service: General;;    RETINAL LASER PROCEDURE Bilateral 2018 or 2017     Dr. Rothman    VASCULAR SURGERY         Review of patient's allergies indicates:   Allergen Reactions    No known allergies        Current Facility-Administered Medications on File Prior to Encounter   Medication    lidocaine (PF) 10 mg/ml (1%) injection 10 mg     Current Outpatient Medications on File Prior to Encounter   Medication Sig    aspirin (ECOTRIN) 81 MG EC tablet Take 1 tablet (81 mg total) by mouth once daily.    atorvastatin (LIPITOR) 80 MG tablet Take 1 tablet (80 mg total) by mouth once daily.    carvediloL (COREG) 25 MG tablet Take 1 tablet (25 mg total) by mouth 2 (two) times daily with meals.    ciclopirox (PENLAC) 8 % Soln Apply topically nightly.    clopidogreL (PLAVIX) 75 mg tablet Take 1 tablet (75 mg total) by mouth once daily.    epoetin philip (PROCRIT) 4,000 unit/mL injection Inject 1.93 mLs (7,720 Units total) into the skin every Mon, Wed, Fri.    ezetimibe (ZETIA) 10 mg tablet Take 1 tablet (10 mg total) by mouth once daily.    isosorbide mononitrate (IMDUR) 120 MG 24 hr tablet Take 1 tablet (120 mg total) by mouth once daily.    methoxy peg-epoetin beta (MIRCERA INJ) 150 mcg.    minoxidiL (LONITEN) 10 MG Tab Take 1 tablet (10 mg total) by mouth 2 (two) times daily.    NIFEdipine (PROCARDIA-XL) 90 MG (OSM) 24 hr tablet Take 1 tablet (90 mg total) by mouth once daily.    nitroGLYCERIN (NITROSTAT) 0.4 MG SL tablet Place 1 tablet (0.4 mg total) under the tongue every 5 (five) minutes as needed for Chest pain.    polyethylene glycol (GLYCOLAX) 17 gram/dose powder Take 17 g by mouth daily as needed for Constipation.    ranolazine (RANEXA) 500 MG Tb12 Take 1 tablet (500 mg total) by mouth Daily.    sevelamer carbonate (RENVELA) 800 mg Tab Take 2 tablets (1,600 mg total) by mouth 3 (three) times daily with meals.    vit B complex-C-folic ac-zinc (RENAPLEX) 800 mcg- 12.5 mg Tab Take 1 tablet by mouth.     Family History       Problem Relation (Age of Onset)    Cancer Maternal Grandfather     Cataracts Mother    Diabetes Mother    Heart disease Brother    Hypertension Mother, Father, Sister, Brother    Kidney disease Brother          Tobacco Use    Smoking status: Never    Smokeless tobacco: Never   Substance and Sexual Activity    Alcohol use: Not Currently     Comment: One drink a month    Drug use: No    Sexual activity: Yes     Partners: Female     Birth control/protection: None     Review of Systems   Constitutional: Negative.    HENT: Negative.     Respiratory:  Positive for chest tightness (Resolved).    Cardiovascular:  Positive for chest pain (Resolved) and palpitations (Resolved).   Gastrointestinal: Negative.    Genitourinary: Negative.    Musculoskeletal: Negative.    Neurological: Negative.    Psychiatric/Behavioral: Negative.       Objective:     Vital Signs (Most Recent):  Temp: 98.2 °F (36.8 °C) (02/09/25 0820)  Pulse: 89 (02/09/25 1000)  Resp: (!) 24 (02/09/25 0900)  BP: (!) 174/96 (02/09/25 1000)  SpO2: (!) 94 % (02/09/25 1000) Vital Signs (24h Range):  Temp:  [98 °F (36.7 °C)-98.2 °F (36.8 °C)] 98.2 °F (36.8 °C)  Pulse:  [] 89  Resp:  [12-25] 24  SpO2:  [90 %-100 %] 94 %  BP: (150-179)/(78-98) 174/96        There is no height or weight on file to calculate BMI.     Physical Exam  Constitutional:       General: He is not in acute distress.     Appearance: Normal appearance. He is not ill-appearing or diaphoretic.   HENT:      Head: Normocephalic and atraumatic.      Mouth/Throat:      Mouth: Mucous membranes are moist.   Eyes:      Extraocular Movements: Extraocular movements intact.   Cardiovascular:      Rate and Rhythm: Normal rate and regular rhythm.      Pulses: Normal pulses.   Pulmonary:      Effort: Pulmonary effort is normal.      Comments: Speaking in full sentences on RA  Abdominal:      General: Abdomen is flat.   Musculoskeletal:      Right lower leg: No edema.      Left lower leg: No edema.   Skin:     General: Skin is warm.   Neurological:      General: No focal  deficit present.      Mental Status: He is alert and oriented to person, place, and time. Mental status is at baseline.   Psychiatric:         Mood and Affect: Mood normal.         Behavior: Behavior normal.         Thought Content: Thought content normal.                Significant Labs: All pertinent labs within the past 24 hours have been reviewed.    Significant Imaging: I have reviewed all pertinent imaging results/findings within the past 24 hours.  Imaging Results              X-Ray Chest AP Portable (Final result)  Result time 02/09/25 06:39:31      Final result by Yonatan Madden MD (02/09/25 06:39:31)                   Impression:      No convincing evidence of acute cardiopulmonary disease.      Electronically signed by: Yonatan Madden  Date:    02/09/2025  Time:    06:39               Narrative:    EXAMINATION:  XR CHEST AP PORTABLE    CLINICAL HISTORY:  chest pain;    TECHNIQUE:  Single frontal view of the chest was performed.    COMPARISON:  Chest radiograph performed 12/27/2024    FINDINGS:  Monitoring leads overlie the chest.  Grossly unchanged cardiomediastinal contours    Lungs essentially clear.  No definite pneumothorax or large volume pleural effusion.    No acute findings in the visualized abdomen.    Osseous and soft tissue structures appear without definite acute change.                                      Assessment/Plan:     * Coronary artery disease of native artery with stable angina pectoris  Patient presents with left-sided chest pain/palpitations which started late last night while watching TV. Patient endorse left arm weakness with no other associated symptoms. Patient states that his chest issue is more of his heart racing which was concerning for him.    Patient with a history of 3V CAD s/p PCI HILLARY of RCA in 2020 and  of RCA seen on [facility] in 2023 along with 90% stenosis of paroxysmal LAD and 75% stenosis of mid LAD.  Was recently admitted to [facility] and evaluated by Cardiothoracic  "surgery.  Per CTS, there would be negligible benefit of surgical revascularization due to fixed perfusion abnormality/transmural scarring seen on cardiac PT stress test and recommended continued medical management.    Initial troponin 0.207 (similar to previous), EKG NSR and no STEMI, previous echo with EF 50-55% and grade II DD  Euvolemic on exam and stable angina secondary to 3V CAD    Plan:  Continuous cardiac monitoring  Trend troponin   Continue medication rx   NPO for now until cardiology evaluation  Cardiology consulted and would appreciate recommendations      Addendum:   Continue maximal medication therapy with aspirin 81 mg, Plavix 75 mg and high-intensity statin along with Zetia 10 mg daily.  Continue to trend troponin until peak is reached   Patient is started on heparin drip and made NPO past midnight in case revascularization is planned.  Cardiology stated they will discuss with Interventional Cardiology at [facility].      Triple vessel coronary artery disease  Patient with known CAD s/p stent placement, which is controlled Will continue ASA, Plavix, and Statin and monitor for S/Sx of angina/ACS. Continue to monitor on telemetry.     -Continue DAPT with aspirin and Plavix  -Per prior Cardiology note, patient not a candidate for LHC due to multiple fixed perfusion defects not amenable to percutaneous reperfusion therapy.  CTA recommended continued medication management    H/o renal cell carcinoma of left kidney  History noted, s/p bilateral nephrectomies and currently on HD (MWF)    Type 2 diabetes mellitus with chronic kidney disease on chronic dialysis, without long-term current use of insulin  Patient's FSGs are controlled on current medication regimen.  Last A1c reviewed-   Lab Results   Component Value Date    HGBA1C 5.1 01/08/2025     Most recent fingerstick glucose reviewed- No results for input(s): "POCTGLUCOSE" in the last 24 hours.  Current correctional scale   None  Maintain " anti-hyperglycemic dose as follows-   Antihyperglycemics (From admission, onward)      None          Patient is not on diabetic medications at home therefore we will hold off on starting sliding scale insulin    Chronic diastolic heart failure  Results for orders placed during the hospital encounter of 12/27/24    Echo    Interpretation Summary    Left Ventricle: The left ventricle is normal in size. Increased ventricular mass. Moderately increased wall thickness. There is moderate concentric hypertrophy. Regional wall motion abnormalities present. There is low normal systolic function with a visually estimated ejection fraction of 50 - 55%. Grade II diastolic dysfunction.    Right Ventricle: Normal right ventricular cavity size. Wall thickness is normal. Systolic function is normal.    The left atrium is severely dilated.    Aortic Valve: The aortic valve is a trileaflet valve. There is moderate aortic valve sclerosis. Mildly restricted motion.    Mitral Valve: There is moderate posterior mitral annular calcification present. There is mild regurgitation.    Pulmonary Artery: The estimated pulmonary artery systolic pressure is >25 mmHg.    IVC/SVC: The IVC was not visualized.    Pericardium: There is a small anterior effusion.    Patient with a history of HFpEF.  Monitor on telemetry and strict I's and O's and daily weights   today  Continue medication management and HD    Anemia in end-stage renal disease  Anemia is likely due to chronic disease due to ESRD. Most recent hemoglobin and hematocrit are listed below.  Recent Labs     02/09/25  0531   HGB 11.9*   HCT 38.7*     Plan  - Monitor serial CBC: Daily  - Transfuse PRBC if patient becomes hemodynamically unstable, symptomatic or H/H drops below 7/21.  - Patient has not received any PRBC transfusions to date  - Patient's anemia is currently stable    S/p nephrectomy  Patient with a history of renal cell carcinoma s/p bilateral nephrectomy (right kidney in  2022 and left kidney in 2023)    Essential hypertension  Patient's blood pressure range in the last 24 hours was: BP  Min: 150/78  Max: 183/85.The patient's inpatient anti-hypertensive regimen is listed below:  Current Antihypertensives  carvediloL tablet 25 mg, 2 times daily with meals, Oral  isosorbide mononitrate 24 hr tablet 120 mg, Daily, Oral  minoxidiL tablet 10 mg, 2 times daily, Oral  NIFEdipine 24 hr tablet 90 mg, Daily, Oral  ranolazine 12 hr tablet 500 mg, Daily, Oral  hydrALAZINE tablet 25 mg, Every 12 hours, Oral    Plan  - BP is controlled, no changes needed to their regimen    Addendum:   Added hydralazine 25 mg t.i.d. for better blood pressure control    ESRD on hemodialysis  Creatine stable for now. BMP reviewed- noted CrCl cannot be calculated (Unknown ideal weight.). according to latest data. Based on current GFR, CKD stage is end stage.  Monitor UOP and serial BMP and adjust therapy as needed. Renally dose meds. Avoid nephrotoxic medications and procedures.    Continue HD at [facility]    Hyperparathyroidism, secondary renal  Continue home medications      VTE Risk Mitigation (From admission, onward)           Ordered     heparin (porcine) injection 5,000 Units  Every 8 hours         02/09/25 0821     IP VTE HIGH RISK PATIENT  Once         02/09/25 0821     Place sequential compression device  Until discontinued         02/09/25 0821                         On 02/09/2025, patient should be placed in hospital observation services under my care in collaboration with Makenzie Germain MD.           Tawny Pedraza PA-C  Department of Hospital Medicine  [facility]

## 2025-02-09 NOTE — Clinical Note
The catheter was repositioned into the ostium   left main. An angiography was performed of the left coronary arteries. Multiple views were taken. The angiography was performed via power injection.   And removed.

## 2025-02-09 NOTE — SUBJECTIVE & OBJECTIVE
Past Medical History:   Diagnosis Date    CAD (coronary artery disease), native coronary artery 11/21/2019    Cataract     Diabetes mellitus     Diabetic retinopathy     Dialysis patient     DM type 2 causing renal disease, not at goal     ESRD (end stage renal disease) started dialysis 01/2014 06/05/2014    History of colon polyps 02/10/2021    History of COVID-19 12/29/2022    Hyperparathyroidism, secondary renal 06/05/2014    Hypertension     NSTEMI (non-ST elevated myocardial infarction) 12/21/2013    Organ transplant candidate 06/05/2014    Palliative care encounter 10/29/2024    Pleural effusion 06/07/2024    Pneumonia     Post PTCA 08/26/2020    RCA [facility]ARY 7-25-20    Renal cell carcinoma of right kidney     Renal hypertension     Stroke        Past Surgical History:   Procedure Laterality Date    ANGIOGRAM, CORONARY, WITH LEFT HEART CATHETERIZATION N/A 7/1/2021    Procedure: Angiogram, Coronary, with Left Heart Cath;  Surgeon: Miki Zendejas MD;  Location: [facility] CATH LAB;  Service: Cardiology;  Laterality: N/A;    ANGIOGRAM, CORONARY, WITH LEFT HEART CATHETERIZATION N/A 11/28/2023    Procedure: Angiogram, Coronary, with Left Heart Cath;  Surgeon: Noah Pendleton MD;  Location: [facility] CATH LAB;  Service: Cardiology;  Laterality: N/A;    COLONOSCOPY N/A 5/3/2017    Procedure: COLONOSCOPY;  Surgeon: Rufino Carpenter MD;  Location: [facility] (4TH FLR);  Service: Endoscopy;  Laterality: N/A;  pt states can only schedule on Wednesdays    COLONOSCOPY N/A 2/9/2021    Procedure: COLONOSCOPY;  Surgeon: Edil Wong MD;  Location: [facility] (4TH FLR);  Service: Endoscopy;  Laterality: N/A;  Dialysis MWF/ labwork day of procedure  right arm aceess  per Dr. Colin pt can hold Plavix 5 days prior see note- sm  COVID test on 2/6/21 at [facility] - sm    COLONOSCOPY N/A 2/10/2021    Procedure: COLONOSCOPY;  Surgeon: Robert Ayers MD;  Location: [facility] (4TH FLR);  Service: Endoscopy;  Laterality: N/A;  rescheduled due to poor  bowel prep-BB  negative covid screening 2/6/21-BB  dialysis M-W-F-BB  okay to r/s for 2/9/21 and to hold Plavix per Dr. KIRAN Wong-BB  labs same day-BB    CORONARY STENT PLACEMENT N/A 7/25/2019    Procedure: INSERTION, STENT, CORONARY ARTERY;  Surgeon: Miki Zendejas MD;  Location: [facility] CATH LAB;  Service: Cardiology;  Laterality: N/A;    DIALYSIS FISTULA CREATION      FISTULOGRAM N/A 2/18/2019    Procedure: Fistulogram;  Surgeon: Yaneth De La Cruz MD;  Location: [facility] CATH LAB;  Service: Cardiology;  Laterality: N/A;    FISTULOGRAM Right 7/23/2019    Procedure: Fistulogram;  Surgeon: Oz Cordoba MD;  Location: [facility] CATH LAB;  Service: Cardiology;  Laterality: Right;    LAPAROSCOPIC ROBOT-ASSISTED SURGICAL REMOVAL OF KIDNEY USING DA JAIME XI Right 5/19/2022    Procedure: XI ROBOTIC NEPHRECTOMY;  Surgeon: Aidan Hendricks MD;  Location: [address];  Service: Urology;  Laterality: Right;  3hrs    LAPAROSCOPIC ROBOT-ASSISTED SURGICAL REMOVAL OF KIDNEY USING DA JAIME XI Left 1/5/2023    Procedure: XI ROBOTIC NEPHRECTOMY;  Surgeon: Aidan Hendricks MD;  Location: [address];  Service: Urology;  Laterality: Left;  4 hrs    LEFT HEART CATHETERIZATION Left 7/25/2019    Procedure: Left heart cath;  Surgeon: Miki Zendejas MD;  Location: [facility] CATH LAB;  Service: Cardiology;  Laterality: Left;    REPAIR  1/5/2023    Procedure: REPAIR; COLOTOMY;  Surgeon: Maikel Lamb MD;  Location: [address];  Service: General;;    RETINAL LASER PROCEDURE Bilateral 2018 or 2017    Dr. Rothman    VASCULAR SURGERY         Review of patient's allergies indicates:   Allergen Reactions    No known allergies        Current Facility-Administered Medications on File Prior to Encounter   Medication    lidocaine (PF) 10 mg/ml (1%) injection 10 mg     Current Outpatient Medications on File Prior to Encounter   Medication Sig    aspirin (ECOTRIN) 81 MG EC tablet Take 1 tablet (81 mg total) by mouth once daily.     atorvastatin (LIPITOR) 80 MG tablet Take 1 tablet (80 mg total) by mouth once daily.    carvediloL (COREG) 25 MG tablet Take 1 tablet (25 mg total) by mouth 2 (two) times daily with meals.    ciclopirox (PENLAC) 8 % Soln Apply topically nightly.    clopidogreL (PLAVIX) 75 mg tablet Take 1 tablet (75 mg total) by mouth once daily.    epoetin philip (PROCRIT) 4,000 unit/mL injection Inject 1.93 mLs (7,720 Units total) into the skin every Mon, Wed, Fri.    ezetimibe (ZETIA) 10 mg tablet Take 1 tablet (10 mg total) by mouth once daily.    isosorbide mononitrate (IMDUR) 120 MG 24 hr tablet Take 1 tablet (120 mg total) by mouth once daily.    methoxy peg-epoetin beta (MIRCERA INJ) 150 mcg.    minoxidiL (LONITEN) 10 MG Tab Take 1 tablet (10 mg total) by mouth 2 (two) times daily.    NIFEdipine (PROCARDIA-XL) 90 MG (OSM) 24 hr tablet Take 1 tablet (90 mg total) by mouth once daily.    nitroGLYCERIN (NITROSTAT) 0.4 MG SL tablet Place 1 tablet (0.4 mg total) under the tongue every 5 (five) minutes as needed for Chest pain.    polyethylene glycol (GLYCOLAX) 17 gram/dose powder Take 17 g by mouth daily as needed for Constipation.    ranolazine (RANEXA) 500 MG Tb12 Take 1 tablet (500 mg total) by mouth Daily.    sevelamer carbonate (RENVELA) 800 mg Tab Take 2 tablets (1,600 mg total) by mouth 3 (three) times daily with meals.    vit B complex-C-folic ac-zinc (RENAPLEX) 800 mcg- 12.5 mg Tab Take 1 tablet by mouth.     Family History       Problem Relation (Age of Onset)    Cancer Maternal Grandfather    Cataracts Mother    Diabetes Mother    Heart disease Brother    Hypertension Mother, Father, Sister, Brother    Kidney disease Brother          Tobacco Use    Smoking status: Never    Smokeless tobacco: Never   Substance and Sexual Activity    Alcohol use: Not Currently     Comment: One drink a month    Drug use: No    Sexual activity: Yes     Partners: Female     Birth control/protection: None     Review of Systems    Constitutional: Negative.    HENT: Negative.     Respiratory:  Positive for chest tightness (Resolved).    Cardiovascular:  Positive for chest pain (Resolved) and palpitations (Resolved).   Gastrointestinal: Negative.    Genitourinary: Negative.    Musculoskeletal: Negative.    Neurological: Negative.    Psychiatric/Behavioral: Negative.       Objective:     Vital Signs (Most Recent):  Temp: 98.2 °F (36.8 °C) (02/09/25 0820)  Pulse: 89 (02/09/25 1000)  Resp: (!) 24 (02/09/25 0900)  BP: (!) 174/96 (02/09/25 1000)  SpO2: (!) 94 % (02/09/25 1000) Vital Signs (24h Range):  Temp:  [98 °F (36.7 °C)-98.2 °F (36.8 °C)] 98.2 °F (36.8 °C)  Pulse:  [] 89  Resp:  [12-25] 24  SpO2:  [90 %-100 %] 94 %  BP: (150-179)/(78-98) 174/96        There is no height or weight on file to calculate BMI.     Physical Exam  Constitutional:       General: He is not in acute distress.     Appearance: Normal appearance. He is not ill-appearing or diaphoretic.   HENT:      Head: Normocephalic and atraumatic.      Mouth/Throat:      Mouth: Mucous membranes are moist.   Eyes:      Extraocular Movements: Extraocular movements intact.   Cardiovascular:      Rate and Rhythm: Normal rate and regular rhythm.      Pulses: Normal pulses.   Pulmonary:      Effort: Pulmonary effort is normal.      Comments: Speaking in full sentences on RA  Abdominal:      General: Abdomen is flat.   Musculoskeletal:      Right lower leg: No edema.      Left lower leg: No edema.   Skin:     General: Skin is warm.   Neurological:      General: No focal deficit present.      Mental Status: He is alert and oriented to person, place, and time. Mental status is at baseline.   Psychiatric:         Mood and Affect: Mood normal.         Behavior: Behavior normal.         Thought Content: Thought content normal.                Significant Labs: All pertinent labs within the past 24 hours have been reviewed.    Significant Imaging: I have reviewed all pertinent imaging  results/findings within the past 24 hours.  Imaging Results              X-Ray Chest AP Portable (Final result)  Result time 02/09/25 06:39:31      Final result by Yonatan Madden MD (02/09/25 06:39:31)                   Impression:      No convincing evidence of acute cardiopulmonary disease.      Electronically signed by: Yonatan Madden  Date:    02/09/2025  Time:    06:39               Narrative:    EXAMINATION:  XR CHEST AP PORTABLE    CLINICAL HISTORY:  chest pain;    TECHNIQUE:  Single frontal view of the chest was performed.    COMPARISON:  Chest radiograph performed 12/27/2024    FINDINGS:  Monitoring leads overlie the chest.  Grossly unchanged cardiomediastinal contours    Lungs essentially clear.  No definite pneumothorax or large volume pleural effusion.    No acute findings in the visualized abdomen.    Osseous and soft tissue structures appear without definite acute change.

## 2025-02-09 NOTE — ASSESSMENT & PLAN NOTE
Patient with a history of renal cell carcinoma s/p bilateral nephrectomy (right kidney in 2022 and left kidney in 2023)

## 2025-02-09 NOTE — ED PROVIDER NOTES
"Encounter Date: 2/9/2025       History     Chief Complaint   Patient presents with    Chest Pain     Came in via EMS reporting cp x  hours ago while watching tv. Denies SOB. Dialysis MWF. Last dialyzed Friday.     49-year-old male with ESRD on HD MWF, CAD, presents via EMS to [facility] ER with chest pain and LUE pain.  Patient was watching television around 2:00 a.m., about 4 hours prior to arrival, when he developed left-sided heavy chest pain as well as left forearm pain.  No shortness of breath.  No diaphoresis.  Patient called EMS.  EMS did not administer any meds.    Patient last received dialysis Friday 2/7/25.    Patient was just at [facility] 12/27/24-12/30/24 for chest pain, diagnosed as "NSTEMI, thought to be secondary to demand from hypertensive emergency and hypervolemia in the setting of ESRD."    PMH: CAD s/p stents, RCC status post bilat nephrectomy (R 2022, L 2023), ESRD on HD MWF, DM2, HTN, CVA with R deficits, anemia    TTE 12/28/24  ·  Left Ventricle: The left ventricle is normal in size. Increased ventricular mass. Moderately increased wall thickness. There is moderate concentric hypertrophy. Regional wall motion abnormalities present. There is low normal systolic function with a visually estimated ejection fraction of 50 - 55%. Grade II diastolic dysfunction.  ·  Right Ventricle: Normal right ventricular cavity size. Wall thickness is normal. Systolic function is normal.  ·  The left atrium is severely dilated.  ·  Aortic Valve: The aortic valve is a trileaflet valve. There is moderate aortic valve sclerosis. Mildly restricted motion.  ·  Mitral Valve: There is moderate posterior mitral annular calcification present. There is mild regurgitation.  ·  Pulmonary Artery: The estimated pulmonary artery systolic pressure is >25 mmHg.  ·  IVC/SVC: The IVC was not visualized.  ·  Pericardium: There is a small anterior effusion.    Ariadne Dickinson (sister) [phone]        Review of patient's " allergies indicates:   Allergen Reactions    No known allergies      Past Medical History:   Diagnosis Date    CAD (coronary artery disease), native coronary artery 11/21/2019    Cataract     Diabetes mellitus     Diabetic retinopathy     Dialysis patient     DM type 2 causing renal disease, not at goal     ESRD (end stage renal disease) started dialysis 01/2014 06/05/2014    History of colon polyps 02/10/2021    History of COVID-19 12/29/2022    Hyperparathyroidism, secondary renal 06/05/2014    Hypertension     NSTEMI (non-ST elevated myocardial infarction) 12/21/2013    Organ transplant candidate 06/05/2014    Palliative care encounter 10/29/2024    Pleural effusion 06/07/2024    Pneumonia     Post PTCA 08/26/2020    RCA [facility] 7-25-20    Renal cell carcinoma of right kidney     Renal hypertension     Stroke      Past Surgical History:   Procedure Laterality Date    ANGIOGRAM, CORONARY, WITH LEFT HEART CATHETERIZATION N/A 7/1/2021    Procedure: Angiogram, Coronary, with Left Heart Cath;  Surgeon: Miki Zendejas MD;  Location: [facility] CATH LAB;  Service: Cardiology;  Laterality: N/A;    ANGIOGRAM, CORONARY, WITH LEFT HEART CATHETERIZATION N/A 11/28/2023    Procedure: Angiogram, Coronary, with Left Heart Cath;  Surgeon: Noah Pendleton MD;  Location: [facility] CATH LAB;  Service: Cardiology;  Laterality: N/A;    COLONOSCOPY N/A 5/3/2017    Procedure: COLONOSCOPY;  Surgeon: Rufino Carpenter MD;  Location: [facility] (4TH FLR);  Service: Endoscopy;  Laterality: N/A;  pt states can only schedule on Wednesdays    COLONOSCOPY N/A 2/9/2021    Procedure: COLONOSCOPY;  Surgeon: Edil Wong MD;  Location: [facility] (4TH FLR);  Service: Endoscopy;  Laterality: N/A;  Dialysis MWF/ labwork day of procedure  right arm aceess  per Dr. Colin pt can hold Plavix 5 days prior see note- sm  COVID test on 2/6/21 at [facility] - sm    COLONOSCOPY N/A 2/10/2021    Procedure: COLONOSCOPY;  Surgeon: Robert Ayers MD;  Location: [facility] ([facility]  FLR);  Service: Endoscopy;  Laterality: N/A;  rescheduled due to poor bowel prep-BB  negative covid screening 2/6/21-BB  dialysis M-W-F-BB  okay to r/s for 2/9/21 and to hold Plavix per Dr. KIRAN Wong-BB  labs same day-BB    CORONARY STENT PLACEMENT N/A 7/25/2019    Procedure: INSERTION, STENT, CORONARY ARTERY;  Surgeon: Miki Zendejas MD;  Location: [facility] CATH LAB;  Service: Cardiology;  Laterality: N/A;    DIALYSIS FISTULA CREATION      FISTULOGRAM N/A 2/18/2019    Procedure: Fistulogram;  Surgeon: Yaneth De La Cruz MD;  Location: [facility] CATH LAB;  Service: Cardiology;  Laterality: N/A;    FISTULOGRAM Right 7/23/2019    Procedure: Fistulogram;  Surgeon: Oz Cordoba MD;  Location: [facility] CATH LAB;  Service: Cardiology;  Laterality: Right;    LAPAROSCOPIC ROBOT-ASSISTED SURGICAL REMOVAL OF KIDNEY USING DA JAIME XI Right 5/19/2022    Procedure: XI ROBOTIC NEPHRECTOMY;  Surgeon: Aidan Hendricks MD;  [address]  Service: Urology;  Laterality: Right;  3hrs    LAPAROSCOPIC ROBOT-ASSISTED SURGICAL REMOVAL OF KIDNEY USING DA JAIME XI Left 1/5/2023    Procedure: XI ROBOTIC NEPHRECTOMY;  Surgeon: Aidan Hendricks MD;  [facility]  Service: Urology;  Laterality: Left;  4 hrs    LEFT HEART CATHETERIZATION Left 7/25/2019    Procedure: Left heart cath;  Surgeon: Miki Zendejas MD;  Location: [facility] CATH LAB;  Service: Cardiology;  Laterality: Left;    REPAIR  1/5/2023    Procedure: REPAIR; COLOTOMY;  Surgeon: Maikel Lamb MD;  Location: [facility] OR [address] [place] [address];  Service: General;;    RETINAL LASER PROCEDURE Bilateral 2018 or 2017    Dr. Rothman    VASCULAR SURGERY       Family History   Problem Relation Name Age of Onset    Hypertension Mother      Diabetes Mother      Cataracts Mother      Hypertension Father      Hypertension Sister      Kidney disease Brother      Hypertension Brother      Heart disease Brother      Cancer Maternal Grandfather          Colon CA    Colon cancer Neg Hx       Esophageal cancer Neg Hx      Stomach cancer Neg Hx      Rectal cancer Neg Hx      Ulcerative colitis Neg Hx      Irritable bowel syndrome Neg Hx      Crohn's disease Neg Hx      Celiac disease Neg Hx      Glaucoma Neg Hx      Macular degeneration Neg Hx       Social History     Tobacco Use    Smoking status: Never    Smokeless tobacco: Never   Substance Use Topics    Alcohol use: Not Currently     Comment: One drink a month    Drug use: No     Review of Systems   Respiratory:  Negative for shortness of breath.    Cardiovascular:  Positive for chest pain.       Physical Exam     Initial Vitals [02/09/25 0523]   BP Pulse Resp Temp SpO2   (!) 150/78 88 18 98 °F (36.7 °C) 98 %      MAP       --         Physical Exam    Nursing note and vitals reviewed.  Constitutional: He appears well-developed and well-nourished. He is not diaphoretic.   Awake, alert. Appears older than stated age.   HENT:   Head: Normocephalic and atraumatic.   Eyes: Conjunctivae and EOM are normal. Pupils are equal, round, and reactive to light.   Neck: Neck supple.   Normal range of motion.  Cardiovascular:  Normal rate, regular rhythm and intact distal pulses.           Murmur heard.  Pulmonary/Chest: No respiratory distress. He has no wheezes. He has no rhonchi. He has no rales.   Abdominal: Abdomen is soft. There is no abdominal tenderness.   Musculoskeletal:         General: Edema (trace bilat LE) present. No tenderness. Normal range of motion.      Cervical back: Normal range of motion and neck supple.      Comments: R forearm AV fistula +thrill.     Neurological: He is alert and oriented to person, place, and time. He has normal strength.   Moving all extremities   Skin: Skin is warm and dry.   Psychiatric: He has a normal mood and affect.         ED Course   Procedures  Labs Reviewed   COMPREHENSIVE METABOLIC PANEL - Abnormal       Result Value    Sodium 138      Potassium 4.5      Chloride 101      CO2 21 (*)     Glucose 177 (*)     BUN  52 (*)     Creatinine 9.8 (*)     Calcium 9.7      Total Protein 8.1      Albumin 3.6      Total Bilirubin 0.6      Alkaline Phosphatase 87      AST 11      ALT 9 (*)     eGFR 6 (*)     Anion Gap 16     B-TYPE NATRIURETIC PEPTIDE - Abnormal     (*)    CBC W/ AUTO DIFFERENTIAL - Abnormal    WBC 9.19      RBC 3.92 (*)     Hemoglobin 11.9 (*)     Hematocrit 38.7 (*)     MCV 99 (*)     MCH 30.4      MCHC 30.7 (*)     RDW 16.9 (*)     Platelets 287      MPV 9.5      Immature Granulocytes 0.5      Gran # (ANC) 6.0      Immature Grans (Abs) 0.05 (*)     Lymph # 1.4      Mono # 1.1 (*)     Eos # 0.5      Baso # 0.07      nRBC 0      Gran % 65.8      Lymph % 15.1 (*)     Mono % 12.3      Eosinophil % 5.5      Basophil % 0.8      Differential Method Automated     TROPONIN I - Abnormal    Troponin I 0.207 (*)    PHOSPHORUS - Abnormal    Phosphorus 6.3 (*)    TSH    TSH 1.677     MAGNESIUM    Magnesium 2.1     APTT    aPTT 27.3     PROTIME-INR    Prothrombin Time 11.9      INR 1.1     URINALYSIS, REFLEX TO URINE CULTURE   SARS-COV-2 RDRP GENE    POC Rapid COVID Negative       Acceptable Yes     POCT INFLUENZA A/B MOLECULAR    POC Molecular Influenza A Ag Negative      POC Molecular Influenza B Ag Negative       Acceptable Yes       EKG Readings: (Independently Interpreted)   EMS EKG 05:17: NSR, HR approx 100bpm.  Minimal ST elevation in 1, aVL.  Q-waves in 1, aVL.  T-wave inversions in V5 and V6.  No ectopy.  No STEMI.  EKG 05:29: NSR, HR 99.  TWI in I, aVL, V6.  No ectopy.  No STEMI.        Imaging Results              X-Ray Chest AP Portable (Final result)  Result time 02/09/25 06:39:31      Final result by Yonatan Madden MD (02/09/25 06:39:31)                   Impression:      No convincing evidence of acute cardiopulmonary disease.      Electronically signed by: Yonatan Madden  Date:    02/09/2025  Time:    06:39               Narrative:    EXAMINATION:  XR CHEST AP  PORTABLE    CLINICAL HISTORY:  chest pain;    TECHNIQUE:  Single frontal view of the chest was performed.    COMPARISON:  Chest radiograph performed 12/27/2024    FINDINGS:  Monitoring leads overlie the chest.  Grossly unchanged cardiomediastinal contours    Lungs essentially clear.  No definite pneumothorax or large volume pleural effusion.    No acute findings in the visualized abdomen.    Osseous and soft tissue structures appear without definite acute change.                                       Medications   morphine injection 6 mg (6 mg Intravenous Given 2/9/25 0606)   ondansetron injection 4 mg (4 mg Intravenous Given 2/9/25 0605)   aspirin tablet 325 mg (325 mg Oral Given 2/9/25 0605)   HYDROmorphone injection 1 mg (1 mg Intravenous Given 2/9/25 0704)     Medical Decision Making  49-year-old male with ESRD and CAD now with chest pain.    Differential includes ACS, CHF, PE, arrhythmia, PTX, aortic dissection, PNA, musculoskeletal CP, GERD, anxiety, other.    EKG abnormal but no STEMI.    CXR cardiomegaly, no acute process.    Flu and COVID-19 negative.    Labs:  CBC at baseline.  CMP shows ESRD as expected.  Lytes reassuring.  Troponin elevated at 0.207.  .     Patient received p.o. aspirin 325.  He received IV morphine 6 mg and IV Zofran 4 mg.  He states this improved his chest pain, but he still had left upper extremity pain.  I ordered IV Dilaudid 1 mg.     BPs generally controlled (157/94) -- unlikely hypertensive urgency (unlike prior presentations).    Plan to OBS for high risk chest pain, trend troponins, tele monitoring.    Freda Catalan      Amount and/or Complexity of Data Reviewed  Labs: ordered.  Radiology: ordered.  ECG/medicine tests: ordered.    Risk  OTC drugs.  Prescription drug management.  Parenteral controlled substances.                                      Clinical Impression:  Final diagnoses:  [R07.9] Chest pain (Primary)  [N18.6, Z99.2] ESRD on dialysis  [M79.602] Pain of  left upper extremity  [R07.9] Recurrent chest pain                 Freda Catalan MD  02/09/25 0718

## 2025-02-09 NOTE — ASSESSMENT & PLAN NOTE
Patient's blood pressure range in the last 24 hours was: BP  Min: 150/78  Max: 183/85.The patient's inpatient anti-hypertensive regimen is listed below:  Current Antihypertensives  carvediloL tablet 25 mg, 2 times daily with meals, Oral  isosorbide mononitrate 24 hr tablet 120 mg, Daily, Oral  minoxidiL tablet 10 mg, 2 times daily, Oral  NIFEdipine 24 hr tablet 90 mg, Daily, Oral  ranolazine 12 hr tablet 500 mg, Daily, Oral  hydrALAZINE tablet 25 mg, Every 8 hours, Oral    Plan  - BP is controlled, no changes needed to their regimen    Addendum:   Added hydralazine 25 mg t.i.d. for better blood pressure control  Monitor BP

## 2025-02-09 NOTE — ASSESSMENT & PLAN NOTE
Results for orders placed during the hospital encounter of 12/27/24    Echo    Interpretation Summary    Left Ventricle: The left ventricle is normal in size. Increased ventricular mass. Moderately increased wall thickness. There is moderate concentric hypertrophy. Regional wall motion abnormalities present. There is low normal systolic function with a visually estimated ejection fraction of 50 - 55%. Grade II diastolic dysfunction.    Right Ventricle: Normal right ventricular cavity size. Wall thickness is normal. Systolic function is normal.    The left atrium is severely dilated.    Aortic Valve: The aortic valve is a trileaflet valve. There is moderate aortic valve sclerosis. Mildly restricted motion.    Mitral Valve: There is moderate posterior mitral annular calcification present. There is mild regurgitation.    Pulmonary Artery: The estimated pulmonary artery systolic pressure is >25 mmHg.    IVC/SVC: The IVC was not visualized.    Pericardium: There is a small anterior effusion.    Patient with a history of HFpEF.  Monitor on telemetry and strict I's and O's and daily weights   today  Continue medication management and HD

## 2025-02-09 NOTE — ED NOTES
Provider notified of patient's drop in sats. RT at bedside, placed patient on 5 L O2, no new orders received

## 2025-02-09 NOTE — ASSESSMENT & PLAN NOTE
Patient's blood pressure range in the last 24 hours was: BP  Min: 150/78  Max: 179/84.The patient's inpatient anti-hypertensive regimen is listed below:  Current Antihypertensives  carvediloL tablet 25 mg, 2 times daily with meals, Oral  isosorbide mononitrate 24 hr tablet 120 mg, Daily, Oral  minoxidiL tablet 10 mg, 2 times daily, Oral  NIFEdipine 24 hr tablet 90 mg, Daily, Oral  ranolazine 12 hr tablet 500 mg, Daily, Oral    Plan  - BP is controlled, no changes needed to their regimen

## 2025-02-09 NOTE — ASSESSMENT & PLAN NOTE
Patient with known CAD s/p stent placement, which is controlled Will continue ASA, Plavix, and Statin and monitor for S/Sx of angina/ACS. Continue to monitor on telemetry.     -Continue DAPT with aspirin and Plavix  -Per prior Cardiology note, patient not a candidate for LHC due to multiple fixed perfusion defects not amenable to percutaneous reperfusion therapy.  CTA recommended continued medication management

## 2025-02-09 NOTE — SUBJECTIVE & OBJECTIVE
Past Medical History:   Diagnosis Date    CAD (coronary artery disease), native coronary artery 11/21/2019    Cataract     Diabetes mellitus     Diabetic retinopathy     Dialysis patient     DM type 2 causing renal disease, not at goal     ESRD (end stage renal disease) started dialysis 01/2014 06/05/2014    History of colon polyps 02/10/2021    History of COVID-19 12/29/2022    Hyperparathyroidism, secondary renal 06/05/2014    Hypertension     NSTEMI (non-ST elevated myocardial infarction) 12/21/2013    Organ transplant candidate 06/05/2014    Palliative care encounter 10/29/2024    Pleural effusion 06/07/2024    Pneumonia     Post PTCA 08/26/2020    RCA HILLARY 7-25-20    Renal cell carcinoma of right kidney     Renal hypertension     Stroke        Past Surgical History:   Procedure Laterality Date    ANGIOGRAM, CORONARY, WITH LEFT HEART CATHETERIZATION N/A 7/1/2021    Procedure: Angiogram, Coronary, with Left Heart Cath;  Surgeon: Miki Zendejas MD;  Location: [facility] CATH LAB;  Service: Cardiology;  Laterality: N/A;    ANGIOGRAM, CORONARY, WITH LEFT HEART CATHETERIZATION N/A 11/28/2023    Procedure: Angiogram, Coronary, with Left Heart Cath;  Surgeon: Noah Pendleton MD;  Location: [facility] CATH LAB;  Service: Cardiology;  Laterality: N/A;    COLONOSCOPY N/A 5/3/2017    Procedure: COLONOSCOPY;  Surgeon: Rufino Carpenter MD;  Location: [facility] (4TH FLR);  Service: Endoscopy;  Laterality: N/A;  pt states can only schedule on Wednesdays    COLONOSCOPY N/A 2/9/2021    Procedure: COLONOSCOPY;  Surgeon: Edil Wong MD;  Location: [facility] (4TH FLR);  Service: Endoscopy;  Laterality: N/A;  Dialysis MWF/ labwork day of procedure  right arm aceess  per Dr. Colin pt can hold Plavix 5 days prior see note- sm  COVID test on 2/6/21 at [facility] - sm    COLONOSCOPY N/A 2/10/2021    Procedure: COLONOSCOPY;  Surgeon: Robert Ayers MD;  Location: [facility] (4TH FLR);  Service: Endoscopy;  Laterality: N/A;  rescheduled due to poor  bowel prep-BB  negative covid screening 2/6/21-BB  dialysis M-W-F-BB  okay to r/s for 2/9/21 and to hold Plavix per Dr. KIRAN Wong-BB  labs same day-BB    CORONARY STENT PLACEMENT N/A 7/25/2019    Procedure: INSERTION, STENT, CORONARY ARTERY;  Surgeon: Miki Zendejas MD;  Location: [facility] CATH LAB;  Service: Cardiology;  Laterality: N/A;    DIALYSIS FISTULA CREATION      FISTULOGRAM N/A 2/18/2019    Procedure: Fistulogram;  Surgeon: Yaneth De La Cruz MD;  Location: [facility] CATH LAB;  Service: Cardiology;  Laterality: N/A;    FISTULOGRAM Right 7/23/2019    Procedure: Fistulogram;  Surgeon: Oz Cordoba MD;  Location: [facility] CATH LAB;  Service: Cardiology;  Laterality: Right;    LAPAROSCOPIC ROBOT-ASSISTED SURGICAL REMOVAL OF KIDNEY USING DA JAIME XI Right 5/19/2022    Procedure: XI ROBOTIC NEPHRECTOMY;  Surgeon: Aidan Hendricks MD;  Location: [address];  Service: Urology;  Laterality: Right;  3hrs    LAPAROSCOPIC ROBOT-ASSISTED SURGICAL REMOVAL OF KIDNEY USING DA JAIME XI Left 1/5/2023    Procedure: XI ROBOTIC NEPHRECTOMY;  Surgeon: Aidan Hendricks MD;  Location: [address];  Service: Urology;  Laterality: Left;  4 hrs    LEFT HEART CATHETERIZATION Left 7/25/2019    Procedure: Left heart cath;  Surgeon: Miki Zendejas MD;  Location: [facility] CATH LAB;  Service: Cardiology;  Laterality: Left;    REPAIR  1/5/2023    Procedure: REPAIR; COLOTOMY;  Surgeon: Maikel Lamb MD;  Location: [address];  Service: General;;    RETINAL LASER PROCEDURE Bilateral 2018 or 2017    Dr. Rothman    VASCULAR SURGERY         Review of patient's allergies indicates:   Allergen Reactions    No known allergies        Current Facility-Administered Medications on File Prior to Encounter   Medication    lidocaine (PF) 10 mg/ml (1%) injection 10 mg     Current Outpatient Medications on File Prior to Encounter   Medication Sig    aspirin (ECOTRIN) 81 MG EC tablet Take 1 tablet (81 mg total) by mouth once daily.     atorvastatin (LIPITOR) 80 MG tablet Take 1 tablet (80 mg total) by mouth once daily.    carvediloL (COREG) 25 MG tablet Take 1 tablet (25 mg total) by mouth 2 (two) times daily with meals.    ciclopirox (PENLAC) 8 % Soln Apply topically nightly.    clopidogreL (PLAVIX) 75 mg tablet Take 1 tablet (75 mg total) by mouth once daily.    epoetin philip (PROCRIT) 4,000 unit/mL injection Inject 1.93 mLs (7,720 Units total) into the skin every Mon, Wed, Fri.    ezetimibe (ZETIA) 10 mg tablet Take 1 tablet (10 mg total) by mouth once daily.    isosorbide mononitrate (IMDUR) 120 MG 24 hr tablet Take 1 tablet (120 mg total) by mouth once daily.    methoxy peg-epoetin beta (MIRCERA INJ) 150 mcg.    minoxidiL (LONITEN) 10 MG Tab Take 1 tablet (10 mg total) by mouth 2 (two) times daily.    NIFEdipine (PROCARDIA-XL) 90 MG (OSM) 24 hr tablet Take 1 tablet (90 mg total) by mouth once daily.    nitroGLYCERIN (NITROSTAT) 0.4 MG SL tablet Place 1 tablet (0.4 mg total) under the tongue every 5 (five) minutes as needed for Chest pain.    polyethylene glycol (GLYCOLAX) 17 gram/dose powder Take 17 g by mouth daily as needed for Constipation.    ranolazine (RANEXA) 500 MG Tb12 Take 1 tablet (500 mg total) by mouth Daily.    sevelamer carbonate (RENVELA) 800 mg Tab Take 2 tablets (1,600 mg total) by mouth 3 (three) times daily with meals.    vit B complex-C-folic ac-zinc (RENAPLEX) 800 mcg- 12.5 mg Tab Take 1 tablet by mouth.     Family History       Problem Relation (Age of Onset)    Cancer Maternal Grandfather    Cataracts Mother    Diabetes Mother    Heart disease Brother    Hypertension Mother, Father, Sister, Brother    Kidney disease Brother          Tobacco Use    Smoking status: Never    Smokeless tobacco: Never   Substance and Sexual Activity    Alcohol use: Not Currently     Comment: One drink a month    Drug use: No    Sexual activity: Yes     Partners: Female     Birth control/protection: None     Review of Systems    Cardiovascular:  Positive for chest pain and dyspnea on exertion. Negative for claudication, irregular heartbeat, leg swelling, near-syncope, orthopnea, palpitations, paroxysmal nocturnal dyspnea and syncope.   Respiratory:  Negative for cough, shortness of breath, snoring and sputum production.    Gastrointestinal:  Negative for abdominal pain, dysphagia, heartburn, nausea and vomiting.   Neurological:  Negative for dizziness, headaches, loss of balance and weakness.     Objective:     Vital Signs (Most Recent):  Temp: 98.3 °F (36.8 °C) (02/09/25 1040)  Pulse: 79 (02/09/25 1440)  Resp: 19 (02/09/25 1440)  BP: (!) 157/81 (02/09/25 1400)  SpO2: (!) 91 % (02/09/25 1440) Vital Signs (24h Range):  Temp:  [98 °F (36.7 °C)-98.3 °F (36.8 °C)] 98.3 °F (36.8 °C)  Pulse:  [] 79  Resp:  [12-25] 19  SpO2:  [85 %-100 %] 91 %  BP: (150-183)/(78-98) 157/81     Weight: 69.4 kg (153 lb)  Body mass index is 23.96 kg/m².    SpO2: (!) 91 %       No intake or output data in the 24 hours ending 02/09/25 1500    Lines/Drains/Airways       Peripheral Intravenous Line  Duration                  Hemodialysis AV Fistula Right forearm -- days         Peripheral IV - Single Lumen 02/09/25 0706 20 G No Left Antecubital <1 day                     Physical Exam  HENT:      Head: Normocephalic and atraumatic.      Mouth/Throat:      Mouth: Mucous membranes are moist.   Eyes:      Extraocular Movements: Extraocular movements intact.      Pupils: Pupils are equal, round, and reactive to light.   Cardiovascular:      Rate and Rhythm: Normal rate and regular rhythm.      Pulses: Normal pulses.      Heart sounds: Normal heart sounds.   Pulmonary:      Effort: Pulmonary effort is normal.      Breath sounds: Normal breath sounds.   Abdominal:      General: Bowel sounds are normal.      Palpations: Abdomen is soft.   Musculoskeletal:      Left lower leg: No edema.   Skin:     General: Skin is warm.   Neurological:      General: No focal deficit  present.      Mental Status: He is alert.   Psychiatric:         Mood and Affect: Mood normal.         Behavior: Behavior normal.          Current Medications:   [START ON 2/10/2025] aspirin  81 mg Oral Daily    atorvastatin  80 mg Oral Daily    carvediloL  25 mg Oral BID WM    clopidogreL  75 mg Oral Daily    ezetimibe  10 mg Oral Daily    heparin (porcine)  5,000 Units Subcutaneous Q8H    isosorbide mononitrate  120 mg Oral Daily    minoxidiL  10 mg Oral BID    NIFEdipine  90 mg Oral Daily    polyethylene glycol  17 g Oral Daily    ranolazine  500 mg Oral Daily    sevelamer carbonate  1,600 mg Oral TID WM         Current Facility-Administered Medications:     acetaminophen, 650 mg, Oral, Q4H PRN    dextrose 50%, 12.5 g, Intravenous, PRN    dextrose 50%, 25 g, Intravenous, PRN    glucagon (human recombinant), 1 mg, Intramuscular, PRN    glucose, 16 g, Oral, PRN    glucose, 24 g, Oral, PRN    melatonin, 6 mg, Oral, Nightly PRN    naloxone, 0.02 mg, Intravenous, PRN    ondansetron, 4 mg, Intravenous, Q8H PRN    sodium chloride 0.9%, 10 mL, Intravenous, Q12H PRN    Laboratory (all labs reviewed):  CBC:  Recent Labs   Lab 12/27/24  1909 12/28/24  0340 12/29/24  0506 12/30/24  0333 01/02/25  0000 01/15/25  0000 01/20/25  0000 01/29/25  0000 02/05/25  0000 02/09/25  0531   WBC 10.03 10.18 11.29 8.95  --   --   --   --   --  9.19   Hemoglobin 11.0 L 10.7 L 10.4 L 9.5 L   < > 9.7 L 10.4 L 10.7 L 10.7 L 11.9 L   Hematocrit 34.4 L 32.7 L 31.3 L 28.9 L  --   --   --   --   --  38.7 L   Platelets 261 288 305 334  --   --   --   --   --  287    < > = values in this interval not displayed.       CHEMISTRIES:  Recent Labs   Lab 05/19/22  1123 05/19/22  1752 05/20/22  0754 06/13/22  1702 06/14/22  0449 11/30/22  0951 11/02/24  0722 12/27/24  0356 12/28/24  0340 12/29/24  0506 12/30/24  0333 02/09/25  0531   Glucose 93 178 H 97 85 94   < > 87 168 H 130 H 126 H 114 H 177 H   Sodium 140 137 138 140 133 L   < > 132 L 142 135 L 130 L  129 L 138   Potassium 3.8 4.4 4.7 4.1 4.2   < > 4.5 4.1 4.1 4.2 4.2 4.5   BUN 29 H 32 H 44 H 46 H 29 H   < > 43 H 68 H 41 H 61 H 72 H 52 H   Blood Urea Nitrogen  --   --   --   --   --    < >  --   --   --   --   --   --    Creatinine 8.9 H 9.3 H 11.0 H 12.8 H 8.5 H   < > 9.2 H 11.9 H 9.3 H 11.1 H 14.1 H 9.8 H   eGFR if non  6.5 A 6.1 A 5.0 A 4.2 A 6.9 A  --   --   --   --   --   --   --    eGFR  --   --   --   --   --    < > 6.5 A 4.8 A 6.4 A 5.2 A 3.9 A 6 A   Calcium 8.5 L 8.3 L 8.8 8.9 9.1   < > 9.3 9.3 9.1 8.6 L 8.3 L 9.7   Magnesium 2.0  --  2.0 2.0 2.1   < > 2.0  --  1.8 1.9 2.0 2.1    < > = values in this interval not displayed.       CARDIAC BIOMARKERS:  Recent Labs   Lab 10/30/24  2301 10/31/24  0130 10/31/24  0543 02/09/25  0531 02/09/25  1023   Troponin I 1.461 H 1.576 H 1.401 H 0.207 H 0.780 H       COAGS:  Recent Labs   Lab 03/11/24  0629 06/07/24  0756 10/30/24  2301 12/27/24  1909 02/09/25  0531   INR 1.1 1.0 1.1 1.0 1.1       LIPIDS/LFTS:  Recent Labs   Lab 05/02/23  0324 11/27/23  0340 03/11/24  1812 03/12/24  0603 09/02/24  1748 09/02/24  2028 12/27/24  0356 12/28/24  0340 12/29/24  0506 12/30/24  0333 02/09/25  0531   Cholesterol 144  --  96 L  --  131  --   --   --   --   --   --    Triglycerides 94  --  57  --  83  --   --   --   --   --   --    HDL 51  --  51  --  67  --   --   --   --   --   --    LDL Cholesterol 74.2  --  33.6 L  --  47.4 L  --   --   --   --   --   --    Non-HDL Cholesterol 93  --  45  --  64  --   --   --   --   --   --    AST  --    < >  --    < >  --    < > 11 21 18 13 11   ALT  --    < >  --    < >  --    < > 8 L 9 L 7 L 5 L 9 L    < > = values in this interval not displayed.       BNP:  Recent Labs   Lab 09/02/24  1748 10/28/24  0330 10/30/24  1253 12/27/24  0356 02/09/25  0531   BNP 1,956 H 817 H 400 H 1,322 H 610 H       TSH:  Recent Labs   Lab 02/09/25  0531   TSH 1.677       Free T4:        Diagnostic Results:  ECG (personally reviewed and  interpreted tracing(s)):  EKG done on 9th February, 2025 showed sinus rhythm, LVH with repolarization abnormalities    Chest X-Ray (personally reviewed and interpreted image(s)):  Mildly Increased interstitial prominence    Echo: 12/2024    Left Ventricle: The left ventricle is normal in size. Increased ventricular mass. Moderately increased wall thickness. There is moderate concentric hypertrophy. Regional wall motion abnormalities present. There is low normal systolic function with a visually estimated ejection fraction of 50 - 55%. Grade II diastolic dysfunction.    Right Ventricle: Normal right ventricular cavity size. Wall thickness is normal. Systolic function is normal.    The left atrium is severely dilated.    Aortic Valve: The aortic valve is a trileaflet valve. There is moderate aortic valve sclerosis. Mildly restricted motion.    Mitral Valve: There is moderate posterior mitral annular calcification present. There is mild regurgitation.    Pulmonary Artery: The estimated pulmonary artery systolic pressure is >25 mmHg.    IVC/SVC: The IVC was not visualized.    Pericardium: There is a small anterior effusion.    Stress Test: PET scan 10/2024    There are two significant perfusion abnormalities.    Perfusion Abnormality #1 - There is a large sized, severe intensity distal inferior and apical fixed perfusion abnormality involving 20% of LV myocardium in the distribution of the distal LAD territory consistent with a transmural scar.    Within the perfusion abnormality, absolute myocardial perfusion (cc/min/gm) averaged 0.94 cc/min/g at rest, 0.35 cc/min/g at stress, and CFR was 1.06, which equates to severely reduced coronary flow capacity within the LAD territory.    Perfusion Abnormality #2 - There is a small sized, moderate intensity basal inferior and inferolateral fixed perfusion abnormality involving 10% of LV myocardium in the distribution of the PLB/LCX territory consistent with a transmural scar.     Within the perfusion abnormality, absolute myocardial perfusion (cc/min/gm) averaged 0.37 cc/min/g at rest, 0.39 cc/min/g at stress, and CFR was 1.05, which equates to severely reduced coronary flow capacity within the RCA territory.    There are no other significant perfusion abnormalities.    CT attenuation images demonstrate severe diffuse coronary calcifications in the LAD, LCX and RCA territory and moderate diffuse aortic calcifications in the descending aorta.    The gated perfusion images showed an ejection fraction of 38% at rest and 36% during stress. A normal ejection fraction is greater than 47%.    There is regional akinesis at rest of the basal inferior and inferolateral wall(s) and regional akinesis during stress of the basal inferior and inferolateral wall(s). There is regional akinesis at rest of the apical wall(s) and regional akinesis during stress of the apical wall(s).    The study's ECG is uninterpretable.    When compared to a prior study from 6/15/2021, there are significant changes.  There is now a fixed defect in the apex and basal inferior/inferolateral walls. The reversible perfusion abnormality in the inferior wall is no longer present. There has been progression of CAD.    Cath: 11/2023    There was three vessel coronary artery disease.    The Mid LAD lesion was 90% stenosed.    The Mid RCA lesion was 100% stenosed.    The Prox LAD lesion was 75% stenosed.    The pre-procedure left ventricular end diastolic pressure was 26.    There was diastolic dysfunction.    Interval worsening of mid LAD disease. Given diffuse nature of CAD will recommend medcial therapy and HD optimization to improve left sided filling pressures

## 2025-02-10 PROBLEM — D72.829 LEUKOCYTOSIS: Status: ACTIVE | Noted: 2025-02-10

## 2025-02-10 LAB
ALBUMIN SERPL BCP-MCNC: 3.2 G/DL (ref 3.5–5.2)
ALLENS TEST: ABNORMAL
ALP SERPL-CCNC: 101 U/L (ref 40–150)
ALT SERPL W/O P-5'-P-CCNC: 14 U/L (ref 10–44)
ANION GAP SERPL CALC-SCNC: 15 MMOL/L (ref 8–16)
ANION GAP SERPL CALC-SCNC: 18 MMOL/L (ref 8–16)
APTT PPP: 30.2 SEC (ref 21–32)
APTT PPP: 33 SEC (ref 21–32)
APTT PPP: 38 SEC (ref 21–32)
AST SERPL-CCNC: 78 U/L (ref 10–40)
BASOPHILS # BLD AUTO: 0.08 K/UL (ref 0–0.2)
BASOPHILS NFR BLD: 0.6 % (ref 0–1.9)
BILIRUB SERPL-MCNC: 0.7 MG/DL (ref 0.1–1)
BUN SERPL-MCNC: 61 MG/DL (ref 6–20)
BUN SERPL-MCNC: 74 MG/DL (ref 6–20)
CALCIUM SERPL-MCNC: 10.1 MG/DL (ref 8.7–10.5)
CALCIUM SERPL-MCNC: 9.3 MG/DL (ref 8.7–10.5)
CHLORIDE SERPL-SCNC: 100 MMOL/L (ref 95–110)
CHLORIDE SERPL-SCNC: 101 MMOL/L (ref 95–110)
CHOLEST SERPL-MCNC: 110 MG/DL (ref 120–199)
CHOLEST/HDLC SERPL: 1.9 {RATIO} (ref 2–5)
CO2 SERPL-SCNC: 19 MMOL/L (ref 23–29)
CO2 SERPL-SCNC: 20 MMOL/L (ref 23–29)
CREAT SERPL-MCNC: 11.3 MG/DL (ref 0.5–1.4)
CREAT SERPL-MCNC: 12.7 MG/DL (ref 0.5–1.4)
DIFFERENTIAL METHOD BLD: ABNORMAL
EOSINOPHIL # BLD AUTO: 0.2 K/UL (ref 0–0.5)
EOSINOPHIL NFR BLD: 1.7 % (ref 0–8)
ERYTHROCYTE [DISTWIDTH] IN BLOOD BY AUTOMATED COUNT: 16.8 % (ref 11.5–14.5)
ERYTHROCYTE [DISTWIDTH] IN BLOOD BY AUTOMATED COUNT: 16.8 % (ref 11.5–14.5)
EST. GFR  (NO RACE VARIABLE): 4 ML/MIN/1.73 M^2
EST. GFR  (NO RACE VARIABLE): 5 ML/MIN/1.73 M^2
GLUCOSE SERPL-MCNC: 114 MG/DL (ref 70–110)
GLUCOSE SERPL-MCNC: 140 MG/DL (ref 70–110)
HCT VFR BLD AUTO: 41.6 % (ref 40–54)
HCT VFR BLD AUTO: 41.6 % (ref 40–54)
HDLC SERPL-MCNC: 58 MG/DL (ref 40–75)
HDLC SERPL: 52.7 % (ref 20–50)
HGB BLD-MCNC: 13.4 G/DL (ref 14–18)
HGB BLD-MCNC: 13.4 G/DL (ref 14–18)
IMM GRANULOCYTES # BLD AUTO: 0.06 K/UL (ref 0–0.04)
IMM GRANULOCYTES NFR BLD AUTO: 0.4 % (ref 0–0.5)
INR PPP: 1.1 (ref 0.8–1.2)
LDLC SERPL CALC-MCNC: 42.8 MG/DL (ref 63–159)
LYMPHOCYTES # BLD AUTO: 0.9 K/UL (ref 1–4.8)
LYMPHOCYTES NFR BLD: 6.6 % (ref 18–48)
MAGNESIUM SERPL-MCNC: 2.1 MG/DL (ref 1.6–2.6)
MCH RBC QN AUTO: 31.1 PG (ref 27–31)
MCH RBC QN AUTO: 31.1 PG (ref 27–31)
MCHC RBC AUTO-ENTMCNC: 32.2 G/DL (ref 32–36)
MCHC RBC AUTO-ENTMCNC: 32.2 G/DL (ref 32–36)
MCV RBC AUTO: 97 FL (ref 82–98)
MCV RBC AUTO: 97 FL (ref 82–98)
MONOCYTES # BLD AUTO: 0.9 K/UL (ref 0.3–1)
MONOCYTES NFR BLD: 6.7 % (ref 4–15)
NEUTROPHILS # BLD AUTO: 11.5 K/UL (ref 1.8–7.7)
NEUTROPHILS NFR BLD: 84 % (ref 38–73)
NONHDLC SERPL-MCNC: 52 MG/DL
NRBC BLD-RTO: 0 /100 WBC
PHOSPHATE SERPL-MCNC: 6.6 MG/DL (ref 2.7–4.5)
PLATELET # BLD AUTO: 335 K/UL (ref 150–450)
PLATELET # BLD AUTO: 335 K/UL (ref 150–450)
PMV BLD AUTO: 9.6 FL (ref 9.2–12.9)
PMV BLD AUTO: 9.6 FL (ref 9.2–12.9)
POC ACTIVATED CLOTTING TIME K: 153 SEC (ref 74–137)
POTASSIUM SERPL-SCNC: 4.9 MMOL/L (ref 3.5–5.1)
POTASSIUM SERPL-SCNC: 5.2 MMOL/L (ref 3.5–5.1)
PROT SERPL-MCNC: 7.7 G/DL (ref 6–8.4)
PROTHROMBIN TIME: 11.9 SEC (ref 9–12.5)
RBC # BLD AUTO: 4.31 M/UL (ref 4.6–6.2)
RBC # BLD AUTO: 4.31 M/UL (ref 4.6–6.2)
SAMPLE: ABNORMAL
SITE: ABNORMAL
SODIUM SERPL-SCNC: 135 MMOL/L (ref 136–145)
SODIUM SERPL-SCNC: 138 MMOL/L (ref 136–145)
TRIGL SERPL-MCNC: 46 MG/DL (ref 30–150)
TROPONIN I SERPL DL<=0.01 NG/ML-MCNC: 39.12 NG/ML (ref 0–0.03)
TROPONIN I SERPL DL<=0.01 NG/ML-MCNC: 43.39 NG/ML (ref 0–0.03)
TROPONIN I SERPL DL<=0.01 NG/ML-MCNC: 46.39 NG/ML (ref 0–0.03)
WBC # BLD AUTO: 13.65 K/UL (ref 3.9–12.7)
WBC # BLD AUTO: 13.65 K/UL (ref 3.9–12.7)

## 2025-02-10 PROCEDURE — 83735 ASSAY OF MAGNESIUM: CPT | Mod: HCNC

## 2025-02-10 PROCEDURE — 63600175 PHARM REV CODE 636 W HCPCS: Mod: HCNC

## 2025-02-10 PROCEDURE — 84100 ASSAY OF PHOSPHORUS: CPT | Mod: HCNC

## 2025-02-10 PROCEDURE — 85730 THROMBOPLASTIN TIME PARTIAL: CPT | Mod: 91,HCNC | Performed by: HOSPITALIST

## 2025-02-10 PROCEDURE — C1894 INTRO/SHEATH, NON-LASER: HCPCS | Mod: HCNC | Performed by: INTERNAL MEDICINE

## 2025-02-10 PROCEDURE — 25000003 PHARM REV CODE 250: Mod: HCNC | Performed by: INTERNAL MEDICINE

## 2025-02-10 PROCEDURE — 85610 PROTHROMBIN TIME: CPT | Mod: HCNC | Performed by: NURSE PRACTITIONER

## 2025-02-10 PROCEDURE — 21400001 HC TELEMETRY ROOM: Mod: HCNC

## 2025-02-10 PROCEDURE — 63600175 PHARM REV CODE 636 W HCPCS: Mod: HCNC | Performed by: INTERNAL MEDICINE

## 2025-02-10 PROCEDURE — B2111ZZ FLUOROSCOPY OF MULTIPLE CORONARY ARTERIES USING LOW OSMOLAR CONTRAST: ICD-10-PCS | Performed by: INTERNAL MEDICINE

## 2025-02-10 PROCEDURE — 85347 COAGULATION TIME ACTIVATED: CPT | Mod: HCNC | Performed by: INTERNAL MEDICINE

## 2025-02-10 PROCEDURE — 4A023N7 MEASUREMENT OF CARDIAC SAMPLING AND PRESSURE, LEFT HEART, PERCUTANEOUS APPROACH: ICD-10-PCS | Performed by: INTERNAL MEDICINE

## 2025-02-10 PROCEDURE — 25000003 PHARM REV CODE 250: Mod: HCNC

## 2025-02-10 PROCEDURE — 84484 ASSAY OF TROPONIN QUANT: CPT | Mod: 91,HCNC | Performed by: HOSPITALIST

## 2025-02-10 PROCEDURE — 85730 THROMBOPLASTIN TIME PARTIAL: CPT | Mod: HCNC | Performed by: HOSPITALIST

## 2025-02-10 PROCEDURE — 80061 LIPID PANEL: CPT | Mod: HCNC

## 2025-02-10 PROCEDURE — 80053 COMPREHEN METABOLIC PANEL: CPT | Mod: HCNC

## 2025-02-10 PROCEDURE — 93458 L HRT ARTERY/VENTRICLE ANGIO: CPT | Mod: 26,HCNC,, | Performed by: INTERNAL MEDICINE

## 2025-02-10 PROCEDURE — 25500020 PHARM REV CODE 255: Mod: HCNC | Performed by: INTERNAL MEDICINE

## 2025-02-10 PROCEDURE — 84484 ASSAY OF TROPONIN QUANT: CPT | Mod: 91,HCNC | Performed by: NURSE PRACTITIONER

## 2025-02-10 PROCEDURE — C1769 GUIDE WIRE: HCPCS | Mod: HCNC | Performed by: INTERNAL MEDICINE

## 2025-02-10 PROCEDURE — 93458 L HRT ARTERY/VENTRICLE ANGIO: CPT | Mod: HCNC | Performed by: INTERNAL MEDICINE

## 2025-02-10 PROCEDURE — 36415 COLL VENOUS BLD VENIPUNCTURE: CPT | Mod: HCNC | Performed by: INTERNAL MEDICINE

## 2025-02-10 PROCEDURE — 36415 COLL VENOUS BLD VENIPUNCTURE: CPT | Mod: HCNC | Performed by: HOSPITALIST

## 2025-02-10 PROCEDURE — 96366 THER/PROPH/DIAG IV INF ADDON: CPT | Mod: HCNC

## 2025-02-10 PROCEDURE — 80048 BASIC METABOLIC PNL TOTAL CA: CPT | Mod: HCNC,XB | Performed by: INTERNAL MEDICINE

## 2025-02-10 PROCEDURE — C1760 CLOSURE DEV, VASC: HCPCS | Mod: HCNC | Performed by: INTERNAL MEDICINE

## 2025-02-10 PROCEDURE — 84484 ASSAY OF TROPONIN QUANT: CPT | Mod: HCNC

## 2025-02-10 PROCEDURE — 85025 COMPLETE CBC W/AUTO DIFF WBC: CPT | Mod: HCNC

## 2025-02-10 RX ORDER — FENTANYL CITRATE 50 UG/ML
INJECTION, SOLUTION INTRAMUSCULAR; INTRAVENOUS
Status: DISCONTINUED | OUTPATIENT
Start: 2025-02-10 | End: 2025-02-10 | Stop reason: HOSPADM

## 2025-02-10 RX ORDER — SODIUM CHLORIDE 9 MG/ML
INJECTION, SOLUTION INTRAVENOUS ONCE
Status: COMPLETED | OUTPATIENT
Start: 2025-02-10 | End: 2025-02-10

## 2025-02-10 RX ORDER — LIDOCAINE HYDROCHLORIDE 10 MG/ML
INJECTION, SOLUTION EPIDURAL; INFILTRATION; INTRACAUDAL; PERINEURAL
Status: DISCONTINUED | OUTPATIENT
Start: 2025-02-10 | End: 2025-02-10 | Stop reason: HOSPADM

## 2025-02-10 RX ORDER — CEFAZOLIN SODIUM 1 G/3ML
INJECTION, POWDER, FOR SOLUTION INTRAMUSCULAR; INTRAVENOUS
Status: DISCONTINUED | OUTPATIENT
Start: 2025-02-10 | End: 2025-02-10 | Stop reason: HOSPADM

## 2025-02-10 RX ORDER — SODIUM CHLORIDE 0.9 % (FLUSH) 0.9 %
10 SYRINGE (ML) INJECTION
Status: DISCONTINUED | OUTPATIENT
Start: 2025-02-10 | End: 2025-02-11 | Stop reason: HOSPADM

## 2025-02-10 RX ORDER — ONDANSETRON HYDROCHLORIDE 2 MG/ML
INJECTION, SOLUTION INTRAVENOUS
Status: DISCONTINUED | OUTPATIENT
Start: 2025-02-10 | End: 2025-02-10 | Stop reason: HOSPADM

## 2025-02-10 RX ORDER — HYDRALAZINE HYDROCHLORIDE 100 MG/1
1 TABLET, FILM COATED ORAL EVERY 12 HOURS
Status: ON HOLD | COMMUNITY
End: 2025-02-11 | Stop reason: HOSPADM

## 2025-02-10 RX ORDER — MIDAZOLAM HYDROCHLORIDE 1 MG/ML
INJECTION INTRAMUSCULAR; INTRAVENOUS
Status: DISCONTINUED | OUTPATIENT
Start: 2025-02-10 | End: 2025-02-10 | Stop reason: HOSPADM

## 2025-02-10 RX ADMIN — HEPARIN SODIUM 16 UNITS/KG/HR: 10000 INJECTION, SOLUTION INTRAVENOUS at 01:02

## 2025-02-10 RX ADMIN — HEPARIN SODIUM 16 UNITS/KG/HR: 10000 INJECTION, SOLUTION INTRAVENOUS at 10:02

## 2025-02-10 RX ADMIN — ISOSORBIDE MONONITRATE 120 MG: 30 TABLET, EXTENDED RELEASE ORAL at 08:02

## 2025-02-10 RX ADMIN — ASPIRIN 81 MG: 81 TABLET, COATED ORAL at 08:02

## 2025-02-10 RX ADMIN — NIFEDIPINE 90 MG: 30 TABLET, FILM COATED, EXTENDED RELEASE ORAL at 08:02

## 2025-02-10 RX ADMIN — ATORVASTATIN CALCIUM 80 MG: 40 TABLET, FILM COATED ORAL at 08:02

## 2025-02-10 RX ADMIN — CARVEDILOL 25 MG: 12.5 TABLET, FILM COATED ORAL at 08:02

## 2025-02-10 RX ADMIN — MINOXIDIL 10 MG: 2.5 TABLET ORAL at 08:02

## 2025-02-10 RX ADMIN — CLOPIDOGREL BISULFATE 75 MG: 75 TABLET ORAL at 08:02

## 2025-02-10 RX ADMIN — CARVEDILOL 25 MG: 12.5 TABLET, FILM COATED ORAL at 05:02

## 2025-02-10 RX ADMIN — EZETIMIBE 10 MG: 10 TABLET ORAL at 08:02

## 2025-02-10 RX ADMIN — RANOLAZINE 500 MG: 500 TABLET, FILM COATED, EXTENDED RELEASE ORAL at 05:02

## 2025-02-10 RX ADMIN — SODIUM CHLORIDE: 9 INJECTION, SOLUTION INTRAVENOUS at 01:02

## 2025-02-10 NOTE — NURSING
Report received from Yessenia You at this time. Patient resting in bed but easily aroused. Patient with oxygen at 5l via nasal cannula. Heparin infusing at 12 units/kg/hr. Patient has no complaints at this time. Bed in lowest position. Instructed to call for any needs.

## 2025-02-10 NOTE — HOSPITAL COURSE
Admitted chest pain. In the ED: Patient afebrile without leukocytosis, hypertensive on presentation 150/78 otherwise hemodynamically stable, initial troponin 0.207, , CMP consistent with ESRD, COVID influenza negative, chest x-ray shows no acute cardiopulmonary process. EKG in ED NSR and no STEMI. Cards consulted with recommendations of Coronary angiography from 11/2023 reviewed.  He does have a significant calcific lesion in mid left circumflex artery as well.  As area of transmural scarring involves distal left LAD and distal left circumflex artery, there may be a utility of revascularization of proximal LAD. Continue with maximal medical therapy. Continue aspirin 81 mg, Plavix 75 mg and high-intensity statin along with Zetia 10 mg daily  Continue with Ranexa 500 mg b.i.d. (can not up titrate to 1 g b.i.d. given end-stage renal disease). Continue to trend troponin until peak is reached.  Start heparin drip. Echocardiogram pending. Make him NPO past midnight in case revascularization is planned (after discussion with Interventional Cardiology at [facility]). Essential hypertension Blood pressure still not optimally controlled Start hydralazine 25 mg t.i.d., Continue carvedilol 25 mg b.i.d.,  nifedipine 90 mg daily, Imdur 120 mg daily, minoxidil 10 mg b.i.d. consulted nephrology for management of HD .   Coronary angiography 2/10/25:     Severe multivessel coronary artery disease is present    Distal left main had 30-40% calcific stenosis    Proximal LAD had 70-80% calcific stenosis and 100% stenosis in mid to distal portion.  Distal LAD was getting filled via septal collaterals    2nd diagonal artery had 90% stenosis in midportion    Mid left circumflex artery had 40% calcific stenosis.  Stenosis severity can not be determined accurately due to presence of eccentric calcification    Mid RCA had 90% InStent restenosis followed by chronic total occlusion.  Distal RCA was getting filled via right to right  Received a fax re: needing PA  For simvastatin.  Sent PA for medication. Awaiting outcome.   collaterals and left-to-right collaterals    Right common femoral arterial access was closed with a Perclose device    Low normal LVEDP (7 mmHg)  Recommendations:  No culprit lesion was identified on current study  Given extensive multivessel coronary artery disease which is not amenable to revascularization (either PCI or bypass surgery - transmural scar in distal LAD/left circumflex/PLB noted on PET scan done in October, 2024) would recommend maximal medical therapy    F/u outpatient with primary cardiologist

## 2025-02-10 NOTE — PLAN OF CARE
Patient alert and oriented x4. Respirations even and unlabored. 02 at 5l via nasal cannula. No distress noted. Skin warm and dry. Iv x2. Heparin infusing at 14 units/kg/hr. No complications noted. Patient denies pain. Pain medication available as needed. Patient dialysis patient. Does not produce any urine. Av fistula to left arm. Positive for bruit and thrill. Patient has no complaints at this time. Bed in lowest position. Call bell within reach. Instructed to call for any needs.     Problem: Adult Inpatient Plan of Care  Goal: Plan of Care Review  Outcome: Progressing  Flowsheets (Taken 2/10/2025 0535)  Plan of Care Reviewed With: patient  Goal: Patient-Specific Goal (Individualized)  Outcome: Progressing  Goal: Absence of Hospital-Acquired Illness or Injury  Outcome: Progressing  Intervention: Identify and Manage Fall Risk  Flowsheets (Taken 2/10/2025 0535)  Safety Promotion/Fall Prevention:   assistive device/personal item within reach   Fall Risk reviewed with patient/family   high risk medications identified   instructed to call staff for mobility

## 2025-02-10 NOTE — NURSING
Patient's ptt 33 at this time. 30 units bolus given at this time and current rate increased by 2 units/kg/hr per Nomogram. Heparin now infusing at 14unit/kg/hr. Next ptt to be drawn in 6 hours.

## 2025-02-10 NOTE — CARE UPDATE
Brief cardiology note:    Summary         Severe multivessel coronary artery disease is present    Distal left main had 30-40% calcific stenosis    Proximal LAD had 70-80% calcific stenosis and 100% stenosis in mid to distal portion.  Distal LAD was getting filled via septal collaterals    2nd diagonal artery had 90% stenosis in midportion    Mid left circumflex artery had 40% calcific stenosis.  Stenosis severity can not be determined accurately due to presence of eccentric calcification    Mid RCA had 90% InStent restenosis followed by chronic total occlusion.  Distal RCA was getting filled via right to right collaterals and left-to-right collaterals    Right common femoral arterial access was closed with a Perclose device    Low normal LVEDP (7 mmHg)     Recommendations:  No culprit lesion was identified on current study  Given extensive multivessel coronary artery disease which is not amenable to revascularization (either PCI or bypass surgery - transmural scar in distal LAD/left circumflex/PLB noted on PET scan done in October, 2024) would recommend maximal medical therapy          Ajith Hutchins MD  [facility]

## 2025-02-10 NOTE — NURSING
Patient to cath lab via stretcher, heparin gtt stopped by cath lab staff, IVF infusing, no distress noted.

## 2025-02-10 NOTE — SUBJECTIVE & OBJECTIVE
Interval History: patient seen and examined. Denies chest pain. Awaiting further recs from cardiology about [facility] for revascularization of proximal LAD.     Review of Systems   Constitutional: Negative.    HENT: Negative.     Respiratory:  Positive for chest tightness (Resolved).    Cardiovascular:  Positive for chest pain (Resolved) and palpitations (Resolved).   Gastrointestinal: Negative.    Genitourinary: Negative.    Musculoskeletal: Negative.    Neurological: Negative.    Psychiatric/Behavioral: Negative.       Objective:     Vital Signs (Most Recent):  Temp: 97.9 °F (36.6 °C) (02/10/25 0706)  Pulse: 92 (02/10/25 1000)  Resp: (!) 21 (02/10/25 1000)  BP: 126/69 (02/10/25 0706)  SpO2: (!) 93 % (02/10/25 1000) Vital Signs (24h Range):  Temp:  [97.9 °F (36.6 °C)-98.3 °F (36.8 °C)] 97.9 °F (36.6 °C)  Pulse:  [78-92] 92  Resp:  [5-29] 21  SpO2:  [85 %-98 %] 93 %  BP: (121-183)/(66-92) 126/69     Weight: 69.4 kg (153 lb)  Body mass index is 23.96 kg/m².    Intake/Output Summary (Last 24 hours) at 2/10/2025 1010  Last data filed at 2/10/2025 0535  Gross per 24 hour   Intake 169.07 ml   Output --   Net 169.07 ml         Physical Exam  Constitutional:       General: He is not in acute distress.     Appearance: Normal appearance. He is not ill-appearing or diaphoretic.   HENT:      Head: Normocephalic and atraumatic.      Mouth/Throat:      Mouth: Mucous membranes are moist.   Eyes:      Extraocular Movements: Extraocular movements intact.   Cardiovascular:      Rate and Rhythm: Normal rate and regular rhythm.      Pulses: Normal pulses.   Pulmonary:      Effort: Pulmonary effort is normal.   Abdominal:      General: Abdomen is flat.   Musculoskeletal:      Right lower leg: No edema.      Left lower leg: No edema.   Skin:     General: Skin is warm.   Neurological:      General: No focal deficit present.      Mental Status: He is alert and oriented to person, place, and time. Mental status is at baseline.    Psychiatric:         Mood and Affect: Mood normal.         Behavior: Behavior normal.         Thought Content: Thought content normal.             Significant Labs: All pertinent labs within the past 24 hours have been reviewed.    Significant Imaging: I have reviewed all pertinent imaging results/findings within the past 24 hours.

## 2025-02-10 NOTE — ASSESSMENT & PLAN NOTE
Results for orders placed during the hospital encounter of 12/27/24    Echo    Interpretation Summary    Left Ventricle: The left ventricle is normal in size. Increased ventricular mass. Moderately increased wall thickness. There is moderate concentric hypertrophy. Regional wall motion abnormalities present. There is low normal systolic function with a visually estimated ejection fraction of 50 - 55%. Grade II diastolic dysfunction.    Right Ventricle: Normal right ventricular cavity size. Wall thickness is normal. Systolic function is normal.    The left atrium is severely dilated.    Aortic Valve: The aortic valve is a trileaflet valve. There is moderate aortic valve sclerosis. Mildly restricted motion.    Mitral Valve: There is moderate posterior mitral annular calcification present. There is mild regurgitation.    Pulmonary Artery: The estimated pulmonary artery systolic pressure is >25 mmHg.    IVC/SVC: The IVC was not visualized.    Pericardium: There is a small anterior effusion.    Patient with a history of HFpEF.  Monitor on telemetry and strict I's and O's and daily weights  BNP   Continue medication management and HD

## 2025-02-10 NOTE — ASSESSMENT & PLAN NOTE
Anemia is likely due to chronic disease due to ESRD. Most recent hemoglobin and hematocrit are listed below.  Recent Labs     02/09/25  0531 02/09/25  1442 02/10/25  0026   HGB 11.9* 13.8* 13.4*  13.4*   HCT 38.7* 43.4 41.6  41.6       Plan  - Monitor serial CBC: Daily  - Transfuse PRBC if patient becomes hemodynamically unstable, symptomatic or H/H drops below 7/21.  - Patient has not received any PRBC transfusions to date  - Patient's anemia is currently stable

## 2025-02-10 NOTE — NURSING
Patient transferred to room 405 via stretcher with nurse.  Heparin handoff completed with receiving nurse at bedside.  Patient resting comfortably in bed locked in lowest position, side rails up x3, and call bell in reach.

## 2025-02-10 NOTE — NURSING
Patient has outstanding order for urine collection. Patient states that he no longer produces urine. Unable to collect specimen.

## 2025-02-10 NOTE — PROGRESS NOTES
[facility] Emergency Dept  [facility] Medicine  Progress Note    Patient Name: Haroldo Dickinson  MRN: [id number]  Patient Class: OP- Observation   Admission Date: 2/9/2025  Length of Stay: 0 days  Attending Physician: Makenzie Germain MD  Primary Care Provider: Mireya Larose MD        Subjective     Principal Problem:Coronary artery disease of native artery with stable angina pectoris        HPI:  Haroldo Dickinson is a.49 y.o. with a pmh of hypertension, hyperlipidemia, ESRD on HD (MWF), 3V CAD s/p PCI HILLARY of RCA in 2020 with [facility] in 2023 which shows  of % with mid LAD 90% and proximal LAD 75% stenosed presents to the hospital with complaints of substernal chest pain late last night while watching TV.  Patient describes his chest pain as my heart was racing fast.  Also associates some mild left arm weakness.  Patient states these symptoms lasted for just a short while and resolved on its own. Patient is on dialysis for ESRD and was last dialyzed on 02/07/2025 at [facility].  Per chart review, patient was at [facility] from 12/27/24 to 12/30/24 for similar complaints and admitted for NSTEMI which was secondary to demand from hypertensive emergency and hypervolemia in the setting of ESRD.  Per CTS note, there would be negligible benefit of surgical revascularization given fixed perfusion abnormality/transmural scarring on recent cardiac PET stress test and recommended continued medical management.  Patient denies any other alleviating exacerbating factors.  Patient denies headache, blurry vision, shortness on breath, nausea, vomiting, lower extremity swelling, hematemesis, hematochezia, hematuria, or any other associated symptoms.    In the ED: Patient afebrile without leukocytosis, hypertensive on presentation 150/78 otherwise hemodynamically stable, initial troponin 0.207, , CMP consistent with ESRD, COVID influenza negative, chest x-ray shows no acute cardiopulmonary process.  EKG in ED NSR and no STEMI. Patient  given aspirin 325 mg, IV Dilaudid 0.5 mg, IV Dilaudid 1 mg, IV morphine 6 mg, and IV Zofran 4 mg in the ED. Patient placed under observation for further medical management.    Overview/Hospital Course:  Admitted chest pain. In the ED: Patient afebrile without leukocytosis, hypertensive on presentation 150/78 otherwise hemodynamically stable, initial troponin 0.207, , CMP consistent with ESRD, COVID influenza negative, chest x-ray shows no acute cardiopulmonary process. EKG in ED NSR and no STEMI. Cards consulted with recommendations of Coronary angiography from 11/2023 reviewed.  He does have a significant calcific lesion in mid left circumflex artery as well.  As area of transmural scarring involves distal left LAD and distal left circumflex artery, there may be a utility of revascularization of proximal LAD. Continue with maximal medical therapy. Continue aspirin 81 mg, Plavix 75 mg and high-intensity statin along with Zetia 10 mg daily  Continue with Ranexa 500 mg b.i.d. (can not up titrate to 1 g b.i.d. given end-stage renal disease). Continue to trend troponin until peak is reached.  Start heparin drip. Echocardiogram pending. Make him NPO past midnight in case revascularization is planned (after discussion with Interventional Cardiology at [facility]). Essential hypertension Blood pressure still not optimally controlled Start hydralazine 25 mg t.i.d., Continue carvedilol 25 mg b.i.d.,  nifedipine 90 mg daily, Imdur 120 mg daily, minoxidil 10 mg b.i.d. consulted nephrology for management of HD        Interval History: patient seen and examined. Denies chest pain. Coronary angiography today  Review of Systems   Constitutional: Negative.    HENT: Negative.     Respiratory:  Positive for chest tightness (Resolved).    Cardiovascular:  Positive for chest pain (Resolved) and palpitations (Resolved).   Gastrointestinal: Negative.    Genitourinary: Negative.    Musculoskeletal: Negative.    Neurological:  Negative.    Psychiatric/Behavioral: Negative.       Objective:     Vital Signs (Most Recent):  Temp: 97.9 °F (36.6 °C) (02/10/25 0706)  Pulse: 92 (02/10/25 1000)  Resp: (!) 21 (02/10/25 1000)  BP: 126/69 (02/10/25 0706)  SpO2: (!) 93 % (02/10/25 1000) Vital Signs (24h Range):  Temp:  [97.9 °F (36.6 °C)-98.3 °F (36.8 °C)] 97.9 °F (36.6 °C)  Pulse:  [78-92] 92  Resp:  [5-29] 21  SpO2:  [85 %-98 %] 93 %  BP: (121-183)/(66-92) 126/69     Weight: 69.4 kg (153 lb)  Body mass index is 23.96 kg/m².    Intake/Output Summary (Last 24 hours) at 2/10/2025 1010  Last data filed at 2/10/2025 0535  Gross per 24 hour   Intake 169.07 ml   Output --   Net 169.07 ml         Physical Exam  Constitutional:       General: He is not in acute distress.     Appearance: Normal appearance. He is not ill-appearing or diaphoretic.   HENT:      Head: Normocephalic and atraumatic.      Mouth/Throat:      Mouth: Mucous membranes are moist.   Eyes:      Extraocular Movements: Extraocular movements intact.   Cardiovascular:      Rate and Rhythm: Normal rate and regular rhythm.      Pulses: Normal pulses.   Pulmonary:      Effort: Pulmonary effort is normal.   Abdominal:      General: Abdomen is flat.   Musculoskeletal:      Right lower leg: No edema.      Left lower leg: No edema.   Skin:     General: Skin is warm.   Neurological:      General: No focal deficit present.      Mental Status: He is alert and oriented to person, place, and time. Mental status is at baseline.   Psychiatric:         Mood and Affect: Mood normal.         Behavior: Behavior normal.         Thought Content: Thought content normal.             Significant Labs: All pertinent labs within the past 24 hours have been reviewed.    Significant Imaging: I have reviewed all pertinent imaging results/findings within the past 24 hours.    Assessment and Plan     * Coronary artery disease of native artery with stable angina pectoris  Patient presents with left-sided chest  pain/palpitations which started late last night while watching TV. Patient endorse left arm weakness with no other associated symptoms. Patient states that his chest issue is more of his heart racing which was concerning for him.    Patient with a history of 3V CAD s/p PCI HILLARY of RCA in 2020 and  of RCA seen on [facility] in 2023 along with 90% stenosis of paroxysmal LAD and 75% stenosis of mid LAD.  Was recently admitted to [facility] and evaluated by Cardiothoracic surgery.  Per CTS, there would be negligible benefit of surgical revascularization due to fixed perfusion abnormality/transmural scarring seen on cardiac PT stress test and recommended continued medical management.    Initial troponin 0.207 (similar to previous), EKG NSR and no STEMI, previous echo with EF 50-55% and grade II DD  Euvolemic on exam and stable angina secondary to 3V CAD    Plan:  Cards onboard   Addendum:   Continue maximal medication therapy with aspirin 81 mg, Plavix 75 mg and high-intensity statin along with Zetia 10 mg daily.  Continue to trend troponin until peak is reached   Patient is started on heparin drip and made NPO past midnight in case revascularization is planned.  Cardiology stated they will discuss with Interventional Cardiology at [facility].  Telemetry monitoring       Leukocytosis  Unknown reason for elevate WBC 13.65 today   Added urinalysis  Trend WBCs     Triple vessel coronary artery disease  Patient with known CAD s/p stent placement, which is controlled Will continue ASA, Plavix, and Statin and monitor for S/Sx of angina/ACS. Continue to monitor on telemetry.     -Continue DAPT with aspirin and Plavix  -Per prior Cardiology note, patient not a candidate for [facility] due to multiple fixed perfusion defects not amenable to percutaneous reperfusion therapy.  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  Last A1c reviewed-   Lab Results   Component Value Date    HGBA1C 5.1 01/08/2025     Most recent fingerstick glucose reviewed- No results for input(s): "POCTGLUCOSE" in the last 24 hours.  Current correctional scale   None  Maintain anti-hyperglycemic dose as follows-   Antihyperglycemics (From admission, onward)      None          Patient is not on diabetic medications at home therefore we will hold off on starting sliding scale insulin    Chronic diastolic heart failure  Results for orders placed during the hospital encounter of 12/27/24    Echo    Interpretation Summary    Left Ventricle: The left ventricle is normal in size. Increased ventricular mass. Moderately increased wall thickness. There is moderate concentric hypertrophy. Regional wall motion abnormalities present. There is low normal systolic function with a visually estimated ejection fraction of 50 - 55%. Grade II diastolic dysfunction.    Right Ventricle: Normal right ventricular cavity size. Wall thickness is normal. Systolic function is normal.    The left atrium is severely dilated.    Aortic Valve: The aortic valve is a trileaflet valve. There is moderate aortic valve sclerosis. Mildly restricted motion.    Mitral Valve: There is moderate posterior mitral annular calcification present. There is mild regurgitation.    Pulmonary Artery: The estimated pulmonary artery systolic pressure is >25 mmHg.    IVC/SVC: The IVC was not visualized.    Pericardium: There is a small anterior effusion.    Patient with a history of HFpEF.  Monitor on telemetry and strict I's and O's and daily weights  BNP   Continue medication management and HD    Anemia in end-stage renal disease  Anemia is likely due to chronic disease due to ESRD. Most recent hemoglobin and hematocrit are listed below.  Recent " Labs     02/09/25  0531 02/09/25  1442 02/10/25  0026   HGB 11.9* 13.8* 13.4*  13.4*   HCT 38.7* 43.4 41.6  41.6       Plan  - Monitor serial CBC: Daily  - Transfuse PRBC if patient becomes hemodynamically unstable, symptomatic or H/H drops below 7/21.  - Patient has not received any PRBC transfusions to date  - Patient's anemia is currently stable    S/p nephrectomy  Patient with a history of renal cell carcinoma s/p bilateral nephrectomy (right kidney in 2022 and left kidney in 2023)    Essential hypertension  Patient's blood pressure range in the last 24 hours was: BP  Min: 150/78  Max: 183/85.The patient's inpatient anti-hypertensive regimen is listed below:  Current Antihypertensives  carvediloL tablet 25 mg, 2 times daily with meals, Oral  isosorbide mononitrate 24 hr tablet 120 mg, Daily, Oral  minoxidiL tablet 10 mg, 2 times daily, Oral  NIFEdipine 24 hr tablet 90 mg, Daily, Oral  ranolazine 12 hr tablet 500 mg, Daily, Oral  hydrALAZINE tablet 25 mg, Every 8 hours, Oral    Plan  - BP is controlled, no changes needed to their regimen    Addendum:   Added hydralazine 25 mg t.i.d. for better blood pressure control  Monitor BP    ESRD on hemodialysis  Creatine stable for now. BMP reviewed- noted CrCl cannot be calculated (Unknown ideal weight.). according to latest data. Based on current GFR, CKD stage is end stage.  Monitor UOP and serial BMP and adjust therapy as needed. Renally dose meds. Avoid nephrotoxic medications and procedures.    Continue HD at [facility]    Hyperparathyroidism, secondary renal  Continue home medications      VTE Risk Mitigation (From admission, onward)           Ordered     heparin 25,000 units in dextrose 5% (100 units/ml) IV bolus from bag LOW INTENSITY nomogram - [facility]  As needed (PRN)        Question:  Heparin Infusion Adjustment (DO NOT MODIFY ANSWER)  Answer:  [email or web address]\epic\Images\Pharmacy\HeparinInfusions\heparin LOW INTENSITY nomogram for [facility] [id number]    02/09/25 2139      heparin 25,000 units in dextrose 5% (100 units/ml) IV bolus from bag LOW INTENSITY nomogram - [facility]  As needed (PRN)        Question:  Heparin Infusion Adjustment (DO NOT MODIFY ANSWER)  Answer:  [email or web address]\epic\Images\Pharmacy\HeparinInfusions\heparin LOW INTENSITY nomogram for [facility] [id number]    02/09/25 1550     heparin 25,000 units in dextrose 5% 250 mL (100 units/mL) infusion LOW INTENSITY nomogram - [facility]  Continuous        Question:  Begin at (units/kg/hr)  Answer:  12    02/09/25 1550     IP VTE HIGH RISK PATIENT  Once         02/09/25 0821     Place sequential compression device  Until discontinued         02/09/25 0821                    Discharge Planning   QUIN:      Code Status: Full Code   Medical Readiness for Discharge Date:                            Sarah Valencia NP  Department of Hospital Medicine   [facility] - Emergency Dept

## 2025-02-10 NOTE — NURSING
Arrived to floor via stretcher, AAO x 4,can make his needs known, denies any pain at this time, tele # [phone] in place, heparin gtt infusing,safety maintained.

## 2025-02-10 NOTE — NURSING
Patient prepped for LHC and groin shaved.  Report given to receiving nurse.  Awaiting tele box at this time.

## 2025-02-11 VITALS
HEART RATE: 92 BPM | TEMPERATURE: 99 F | RESPIRATION RATE: 18 BRPM | SYSTOLIC BLOOD PRESSURE: 112 MMHG | BODY MASS INDEX: 24.01 KG/M2 | HEIGHT: 67 IN | OXYGEN SATURATION: 96 % | DIASTOLIC BLOOD PRESSURE: 61 MMHG | WEIGHT: 153 LBS

## 2025-02-11 LAB
ALBUMIN SERPL BCP-MCNC: 2.8 G/DL (ref 3.5–5.2)
ALP SERPL-CCNC: 86 U/L (ref 40–150)
ALT SERPL W/O P-5'-P-CCNC: 9 U/L (ref 10–44)
ANION GAP SERPL CALC-SCNC: 15 MMOL/L (ref 8–16)
APTT PPP: 39.9 SEC (ref 21–32)
APTT PPP: 44.5 SEC (ref 21–32)
ASCENDING AORTA: 2.72 CM
AST SERPL-CCNC: 22 U/L (ref 10–40)
AV INDEX (PROSTH): 0.53
AV MEAN GRADIENT: 14 MMHG
AV PEAK GRADIENT: 23 MMHG
AV VALVE AREA BY VELOCITY RATIO: 1.5 CM²
AV VALVE AREA: 1.5 CM²
AV VELOCITY RATIO: 0.54
BASOPHILS # BLD AUTO: 0.07 K/UL (ref 0–0.2)
BASOPHILS NFR BLD: 0.5 % (ref 0–1.9)
BILIRUB SERPL-MCNC: 0.7 MG/DL (ref 0.1–1)
BSA FOR ECHO PROCEDURE: 1.81 M2
BUN SERPL-MCNC: 85 MG/DL (ref 6–20)
CALCIUM SERPL-MCNC: 8.8 MG/DL (ref 8.7–10.5)
CHLORIDE SERPL-SCNC: 99 MMOL/L (ref 95–110)
CO2 SERPL-SCNC: 21 MMOL/L (ref 23–29)
CREAT SERPL-MCNC: 14.1 MG/DL (ref 0.5–1.4)
CV ECHO LV RWT: 0.61 CM
DIFFERENTIAL METHOD BLD: ABNORMAL
DOP CALC AO PEAK VEL: 2.4 M/S
DOP CALC AO VTI: 44 CM
DOP CALC LVOT AREA: 2.8 CM2
DOP CALC LVOT DIAMETER: 1.9 CM
DOP CALC LVOT PEAK VEL: 1.3 M/S
DOP CALC LVOT STROKE VOLUME: 66 CM3
DOP CALC MV VTI: 31 CM
DOP CALCLVOT PEAK VEL VTI: 23.3 CM
E WAVE DECELERATION TIME: 139 MSEC
E/A RATIO: 1.44
E/E' RATIO: 22 M/S
ECHO LV POSTERIOR WALL: 1.8 CM (ref 0.6–1.1)
EOSINOPHIL # BLD AUTO: 0.4 K/UL (ref 0–0.5)
EOSINOPHIL NFR BLD: 2.7 % (ref 0–8)
ERYTHROCYTE [DISTWIDTH] IN BLOOD BY AUTOMATED COUNT: 17.4 % (ref 11.5–14.5)
ERYTHROCYTE [DISTWIDTH] IN BLOOD BY AUTOMATED COUNT: 17.4 % (ref 11.5–14.5)
EST. GFR  (NO RACE VARIABLE): 4 ML/MIN/1.73 M^2
FRACTIONAL SHORTENING: 25.4 % (ref 28–44)
GLUCOSE SERPL-MCNC: 151 MG/DL (ref 70–110)
HCT VFR BLD AUTO: 34.6 % (ref 40–54)
HCT VFR BLD AUTO: 34.6 % (ref 40–54)
HGB BLD-MCNC: 11.1 G/DL (ref 14–18)
HGB BLD-MCNC: 11.1 G/DL (ref 14–18)
IMM GRANULOCYTES # BLD AUTO: 0.06 K/UL (ref 0–0.04)
IMM GRANULOCYTES NFR BLD AUTO: 0.4 % (ref 0–0.5)
INTERVENTRICULAR SEPTUM: 1.9 CM (ref 0.6–1.1)
LA MAJOR: 5.9 CM
LEFT ATRIUM SIZE: 5 CM
LEFT INTERNAL DIMENSION IN SYSTOLE: 4.4 CM (ref 2.1–4)
LEFT VENTRICLE DIASTOLIC VOLUME INDEX: 97.62 ML/M2
LEFT VENTRICLE DIASTOLIC VOLUME: 175.72 ML
LEFT VENTRICLE MASS INDEX: 314.3 G/M2
LEFT VENTRICLE SYSTOLIC VOLUME INDEX: 48.4 ML/M2
LEFT VENTRICLE SYSTOLIC VOLUME: 87.11 ML
LEFT VENTRICULAR INTERNAL DIMENSION IN DIASTOLE: 5.9 CM (ref 3.5–6)
LEFT VENTRICULAR MASS: 565.8 G
LV LATERAL E/E' RATIO: 15.3 M/S
LV SEPTAL E/E' RATIO: 40.7 M/S
LVED V (TEICH): 175.72 ML
LVES V (TEICH): 87.11 ML
LVOT MG: 3.4 MMHG
LVOT MV: 0.85 CM/S
LYMPHOCYTES # BLD AUTO: 1 K/UL (ref 1–4.8)
LYMPHOCYTES NFR BLD: 7.1 % (ref 18–48)
MAGNESIUM SERPL-MCNC: 2.2 MG/DL (ref 1.6–2.6)
MCH RBC QN AUTO: 31 PG (ref 27–31)
MCH RBC QN AUTO: 31 PG (ref 27–31)
MCHC RBC AUTO-ENTMCNC: 32.1 G/DL (ref 32–36)
MCHC RBC AUTO-ENTMCNC: 32.1 G/DL (ref 32–36)
MCV RBC AUTO: 97 FL (ref 82–98)
MCV RBC AUTO: 97 FL (ref 82–98)
MONOCYTES # BLD AUTO: 1.4 K/UL (ref 0.3–1)
MONOCYTES NFR BLD: 10 % (ref 4–15)
MV MEAN GRADIENT: 4 MMHG
MV PEAK A VEL: 0.85 M/S
MV PEAK E VEL: 1.22 M/S
MV PEAK GRADIENT: 7 MMHG
MV STENOSIS PRESSURE HALF TIME: 40.19 MS
MV VALVE AREA BY CONTINUITY EQUATION: 2.13 CM2
MV VALVE AREA P 1/2 METHOD: 5.47 CM2
NEUTROPHILS # BLD AUTO: 11.5 K/UL (ref 1.8–7.7)
NEUTROPHILS NFR BLD: 79.3 % (ref 38–73)
NRBC BLD-RTO: 0 /100 WBC
OHS CV RV/LV RATIO: 0.51 CM
PHOSPHATE SERPL-MCNC: 6.6 MG/DL (ref 2.7–4.5)
PISA TR MAX VEL: 3 M/S
PLATELET # BLD AUTO: 325 K/UL (ref 150–450)
PLATELET # BLD AUTO: 325 K/UL (ref 150–450)
PMV BLD AUTO: 9.5 FL (ref 9.2–12.9)
PMV BLD AUTO: 9.5 FL (ref 9.2–12.9)
POTASSIUM SERPL-SCNC: 5 MMOL/L (ref 3.5–5.1)
PROT SERPL-MCNC: 7.1 G/DL (ref 6–8.4)
RA MAJOR: 4.78 CM
RA WIDTH: 4 CM
RBC # BLD AUTO: 3.58 M/UL (ref 4.6–6.2)
RBC # BLD AUTO: 3.58 M/UL (ref 4.6–6.2)
RIGHT VENTRICLE DIASTOLIC BASEL DIMENSION: 3 CM
RIGHT VENTRICULAR END-DIASTOLIC DIMENSION: 2.97 CM
RV TISSUE DOPPLER FREE WALL SYSTOLIC VELOCITY 1 (APICAL 4 CHAMBER VIEW): 19.64 CM/S
SINUS: 3.54 CM
SODIUM SERPL-SCNC: 135 MMOL/L (ref 136–145)
STJ: 2.54 CM
TDI LATERAL: 0.08 M/S
TDI SEPTAL: 0.03 M/S
TDI: 0.06 M/S
TR MAX PG: 36 MMHG
TRICUSPID ANNULAR PLANE SYSTOLIC EXCURSION: 2.26 CM
TROPONIN I SERPL DL<=0.01 NG/ML-MCNC: 39.77 NG/ML (ref 0–0.03)
TROPONIN I SERPL DL<=0.01 NG/ML-MCNC: 40.74 NG/ML (ref 0–0.03)
WBC # BLD AUTO: 14.46 K/UL (ref 3.9–12.7)
WBC # BLD AUTO: 14.46 K/UL (ref 3.9–12.7)
Z-SCORE OF LEFT VENTRICULAR DIMENSION IN END DIASTOLE: 1.7
Z-SCORE OF LEFT VENTRICULAR DIMENSION IN END SYSTOLE: 2.83

## 2025-02-11 PROCEDURE — 85025 COMPLETE CBC W/AUTO DIFF WBC: CPT | Mod: HCNC

## 2025-02-11 PROCEDURE — 85730 THROMBOPLASTIN TIME PARTIAL: CPT | Mod: 91,HCNC | Performed by: HOSPITALIST

## 2025-02-11 PROCEDURE — 99233 SBSQ HOSP IP/OBS HIGH 50: CPT | Mod: HCNC,,, | Performed by: INTERNAL MEDICINE

## 2025-02-11 PROCEDURE — 80100016 HC MAINTENANCE HEMODIALYSIS: Mod: HCNC

## 2025-02-11 PROCEDURE — 80053 COMPREHEN METABOLIC PANEL: CPT | Mod: HCNC

## 2025-02-11 PROCEDURE — 99223 1ST HOSP IP/OBS HIGH 75: CPT | Mod: HCNC,,, | Performed by: INTERNAL MEDICINE

## 2025-02-11 PROCEDURE — 83735 ASSAY OF MAGNESIUM: CPT | Mod: HCNC

## 2025-02-11 PROCEDURE — 25000003 PHARM REV CODE 250: Mod: HCNC | Performed by: STUDENT IN AN ORGANIZED HEALTH CARE EDUCATION/TRAINING PROGRAM

## 2025-02-11 PROCEDURE — 27000221 HC OXYGEN, UP TO 24 HOURS: Mod: HCNC

## 2025-02-11 PROCEDURE — 84100 ASSAY OF PHOSPHORUS: CPT | Mod: HCNC

## 2025-02-11 PROCEDURE — 25000003 PHARM REV CODE 250: Mod: HCNC

## 2025-02-11 PROCEDURE — 5A1D70Z PERFORMANCE OF URINARY FILTRATION, INTERMITTENT, LESS THAN 6 HOURS PER DAY: ICD-10-PCS | Performed by: INTERNAL MEDICINE

## 2025-02-11 PROCEDURE — 84484 ASSAY OF TROPONIN QUANT: CPT | Mod: HCNC | Performed by: HOSPITALIST

## 2025-02-11 PROCEDURE — 36415 COLL VENOUS BLD VENIPUNCTURE: CPT | Mod: HCNC | Performed by: HOSPITALIST

## 2025-02-11 PROCEDURE — 94761 N-INVAS EAR/PLS OXIMETRY MLT: CPT | Mod: HCNC

## 2025-02-11 RX ORDER — SODIUM CHLORIDE 9 MG/ML
INJECTION, SOLUTION INTRAVENOUS
Status: DISCONTINUED | OUTPATIENT
Start: 2025-02-11 | End: 2025-02-11 | Stop reason: HOSPADM

## 2025-02-11 RX ORDER — MUPIROCIN 20 MG/G
OINTMENT TOPICAL 2 TIMES DAILY
Status: DISCONTINUED | OUTPATIENT
Start: 2025-02-11 | End: 2025-02-11 | Stop reason: HOSPADM

## 2025-02-11 RX ORDER — HYDRALAZINE HYDROCHLORIDE 25 MG/1
25 TABLET, FILM COATED ORAL EVERY 8 HOURS
Qty: 90 TABLET | Refills: 11 | Status: SHIPPED | OUTPATIENT
Start: 2025-02-11 | End: 2026-02-11

## 2025-02-11 RX ADMIN — ATORVASTATIN CALCIUM 80 MG: 40 TABLET, FILM COATED ORAL at 08:02

## 2025-02-11 RX ADMIN — MUPIROCIN: 20 OINTMENT TOPICAL at 09:02

## 2025-02-11 RX ADMIN — EZETIMIBE 10 MG: 10 TABLET ORAL at 08:02

## 2025-02-11 RX ADMIN — CLOPIDOGREL BISULFATE 75 MG: 75 TABLET ORAL at 08:02

## 2025-02-11 RX ADMIN — ASPIRIN 81 MG: 81 TABLET, COATED ORAL at 08:02

## 2025-02-11 NOTE — PROGRESS NOTES
[facility]  Cardiology  Progress Note    Patient Name: Haroldo Dickinson  MRN: [id number]  Admission Date: 2/9/2025  Hospital Length of Stay: 1 days  Code Status: Full Code   Attending Physician: Eric Felix MD   Primary Care Physician: Mireya Larose MD  Expected Discharge Date: 2/11/2025  Principal Problem:Coronary artery disease of native artery with stable angina pectoris    Subjective:     Hospital Course:   Patient was seen and examined this morning.  Underwent coronary angiography via right common femoral arterial access yesterday.  Coronary angiography revealed severe multivessel coronary artery disease.  CAD has progressed since last coronary angiography from 2023  CAD not amenable to PCI or surgery as involve territories had transmural infarct on recent PET scan    Fortunately, he remains chest pain-free.  Troponin has plateaued.        Review of Systems   Cardiovascular:  Negative for chest pain, claudication, dyspnea on exertion, irregular heartbeat, leg swelling, near-syncope, orthopnea, palpitations, paroxysmal nocturnal dyspnea and syncope.   Respiratory:  Negative for cough, shortness of breath, snoring and sputum production.    Gastrointestinal:  Negative for abdominal pain, dysphagia, heartburn, nausea and vomiting.   Neurological:  Negative for dizziness, headaches, loss of balance and weakness.     Objective:     Vital Signs (Most Recent):  Temp: 98.6 °F (37 °C) (02/11/25 1445)  Pulse: 92 (02/11/25 1445)  Resp: 18 (02/11/25 1445)  BP: 112/61 (02/11/25 1445)  SpO2: 96 % (02/11/25 1445) Vital Signs (24h Range):  Temp:  [97.6 °F (36.4 °C)-98.7 °F (37.1 °C)] 98.6 °F (37 °C)  Pulse:  [] 92  Resp:  [18-20] 18  SpO2:  [89 %-97 %] 96 %  BP: ()/(57-78) 112/61     Weight: 69.4 kg (153 lb)  Body mass index is 23.96 kg/m².     SpO2: 96 %         Intake/Output Summary (Last 24 hours) at 2/11/2025 0615  Last data filed at 2/11/2025 0613  Gross per 24 hour   Intake 226.2 ml   Output --   Net  226.2 ml       Lines/Drains/Airways       Peripheral Intravenous Line  Duration                  Hemodialysis AV Fistula Right forearm -- days         Peripheral IV - Single Lumen 02/09/25 0706 20 G No Left Antecubital 2 days         Peripheral IV - Single Lumen 02/09/25 1549 20 G Left;Anterior Forearm 2 days                       Physical Exam  HENT:      Head: Normocephalic and atraumatic.      Mouth/Throat:      Mouth: Mucous membranes are moist.   Eyes:      Extraocular Movements: Extraocular movements intact.      Pupils: Pupils are equal, round, and reactive to light.   Cardiovascular:      Rate and Rhythm: Normal rate and regular rhythm.      Pulses: Normal pulses.      Heart sounds: Normal heart sounds.   Pulmonary:      Effort: Pulmonary effort is normal.      Breath sounds: Normal breath sounds.   Abdominal:      General: Bowel sounds are normal.      Palpations: Abdomen is soft.   Musculoskeletal:      Left lower leg: No edema.   Skin:     General: Skin is warm.   Neurological:      General: No focal deficit present.      Mental Status: He is alert.   Psychiatric:         Mood and Affect: Mood normal.         Behavior: Behavior normal.            Current Medications:   aspirin  81 mg Oral Daily    atorvastatin  80 mg Oral Daily    carvediloL  25 mg Oral BID WM    clopidogreL  75 mg Oral Daily    ezetimibe  10 mg Oral Daily    hydrALAZINE  25 mg Oral Q8H    isosorbide mononitrate  120 mg Oral Daily    minoxidiL  10 mg Oral BID    mupirocin   Nasal BID    NIFEdipine  90 mg Oral Daily    polyethylene glycol  17 g Oral Daily    ranolazine  500 mg Oral Daily    sevelamer carbonate  1,600 mg Oral TID WM      heparin (porcine) in D5W  0-40 Units/kg/hr Intravenous Continuous 11.1 mL/hr at 02/10/25 1352 16 Units/kg/hr at 02/10/25 1352       Current Facility-Administered Medications:     0.9% NaCl, , Intravenous, PRN    acetaminophen, 650 mg, Oral, Q4H PRN    dextrose 50%, 12.5 g, Intravenous, PRN    dextrose 50%,  25 g, Intravenous, PRN    glucagon (human recombinant), 1 mg, Intramuscular, PRN    glucose, 16 g, Oral, PRN    glucose, 24 g, Oral, PRN    heparin (PORCINE), 57.6 Units/kg, Intravenous, PRN    heparin (PORCINE), 30 Units/kg, Intravenous, PRN    melatonin, 6 mg, Oral, Nightly PRN    naloxone, 0.02 mg, Intravenous, PRN    ondansetron, 4 mg, Intravenous, Q8H PRN    sodium chloride 0.9%, 10 mL, Intravenous, Q12H PRN    sodium chloride 0.9%, 10 mL, Intravenous, PRN    Laboratory (all labs reviewed):  CBC:  Recent Labs   Lab 12/30/24  0333 01/02/25  0000 02/05/25  0000 02/09/25  0531 02/09/25  1442 02/10/25  0026 02/11/25  0725   WBC 8.95  --   --  9.19 10.52 13.65 H  13.65 H 14.46 H  14.46 H   Hemoglobin 9.5 L   < > 10.7 L 11.9 L 13.8 L 13.4 L  13.4 L 11.1 L  11.1 L   Hematocrit 28.9 L  --   --  38.7 L 43.4 41.6  41.6 34.6 L  34.6 L   Platelets 334  --   --  287 298 335  335 325  325    < > = values in this interval not displayed.       CHEMISTRIES:  Recent Labs   Lab 05/19/22  1123 05/19/22  1752 05/20/22  0754 06/13/22  1702 06/14/22  0449 11/30/22  0951 12/29/24  0506 12/30/24  0333 02/09/25  0531 02/10/25  0026 02/10/25  2020 02/11/25  0725   Glucose 93 178 H 97 85 94   < > 126 H 114 H 177 H 140 H 114 H 151 H   Sodium 140 137 138 140 133 L   < > 130 L 129 L 138 138 135 L 135 L   Potassium 3.8 4.4 4.7 4.1 4.2   < > 4.2 4.2 4.5 4.9 5.2 H 5.0   BUN 29 H 32 H 44 H 46 H 29 H   < > 61 H 72 H 52 H 61 H 74 H 85 H   Blood Urea Nitrogen  --   --   --   --   --    < >  --   --   --   --   --   --    Creatinine 8.9 H 9.3 H 11.0 H 12.8 H 8.5 H   < > 11.1 H 14.1 H 9.8 H 11.3 H 12.7 H 14.1 H   eGFR if non  6.5 A 6.1 A 5.0 A 4.2 A 6.9 A  --   --   --   --   --   --   --    eGFR  --   --   --   --   --    < > 5.2 A 3.9 A 6 A 5 A 4 A 4 A   Calcium 8.5 L 8.3 L 8.8 8.9 9.1   < > 8.6 L 8.3 L 9.7 10.1 9.3 8.8   Magnesium 2.0  --  2.0 2.0 2.1   < > 1.9 2.0 2.1 2.1  --  2.2    < > = values in this interval not  displayed.       CARDIAC BIOMARKERS:  Recent Labs   Lab 02/10/25  0843 02/10/25  1320 02/10/25  1842 02/11/25  0131 02/11/25  0725   Troponin I 39.120 H 43.394 H 46.394 H 39.767 H 40.738 H       COAGS:  Recent Labs   Lab 10/30/24  2301 12/27/24  1909 02/09/25  0531 02/09/25  1731 02/10/25  0947   INR 1.1 1.0 1.1 1.1 1.1       LIPIDS/LFTS:  Recent Labs   Lab 05/02/23  0324 11/27/23  0340 03/11/24  1812 03/12/24  0603 09/02/24  1748 09/02/24  2028 12/29/24  0506 12/30/24  0333 02/09/25  0531 02/10/25  0026 02/11/25  0725   Cholesterol 144  --  96 L  --  131  --   --   --   --  110 L  --    Triglycerides 94  --  57  --  83  --   --   --   --  46  --    HDL 51  --  51  --  67  --   --   --   --  58  --    LDL Cholesterol 74.2  --  33.6 L  --  47.4 L  --   --   --   --  42.8 L  --    Non-HDL Cholesterol 93  --  45  --  64  --   --   --   --  52  --    AST  --    < >  --    < >  --    < > 18 13 11 78 H 22   ALT  --    < >  --    < >  --    < > 7 L 5 L 9 L 14 9 L    < > = values in this interval not displayed.       BNP:  Recent Labs   Lab 09/02/24  1748 10/28/24  0330 10/30/24  1253 12/27/24  0356 02/09/25  0531   BNP 1,956 H 817 H 400 H 1,322 H 610 H       TSH:  Recent Labs   Lab 02/09/25  0531   TSH 1.677       Free T4:          Assessment and Plan:       * Coronary artery disease of native artery with stable angina pectoris  Coronary angiography done yesterday showed severe multivessel coronary artery disease not amenable to PCI  Echocardiogram showed low-normal EF     Continue with maximal medical therapy for coronary artery disease  Continue aspirin 81 mg, Plavix 75 mg and high-intensity statin along with Zetia 10 mg daily  Continue with Ranexa 500 mg b.i.d. (can not up titrate to 1 g b.i.d. given end-stage renal disease)    Follow with Cardiology as an outpatient    Essential hypertension  Blood pressure stable  Continue hydralazine 25 mg t.i.d.  Continue carvedilol 25 mg b.i.d.  Continue nifedipine 90 mg  daily  Continue Imdur 120 mg daily  Continue minoxidil 10 mg b.i.d.    ESRD on hemodialysis  Hemodialysis per Nephrology        VTE Risk Mitigation (From admission, onward)           Ordered     heparin 25,000 units in dextrose 5% (100 units/ml) IV bolus from bag LOW INTENSITY nomogram - [facility]  As needed (PRN)        Question:  Heparin Infusion Adjustment (DO NOT MODIFY ANSWER)  Answer:  [email or web address]\Images\Pharmacy\HeparinInfusions\heparin LOW INTENSITY nomogram for [facility] [id number].pdf    02/09/25 1550     heparin 25,000 units in dextrose 5% (100 units/ml) IV bolus from bag LOW INTENSITY nomogram - [facility]  As needed (PRN)        Question:  Heparin Infusion Adjustment (DO NOT MODIFY ANSWER)  Answer:  [email or web address]\epic\Images\Pharmacy\HeparinInfusions\heparin LOW INTENSITY nomogram for [facility] [id number].pdf    02/09/25 1550     heparin 25,000 units in dextrose 5% 250 mL (100 units/mL) infusion LOW INTENSITY nomogram - [facility]  Continuous        Question:  Begin at (units/kg/hr)  Answer:  12    02/09/25 1550     IP VTE HIGH RISK PATIENT  Once         02/09/25 0821     Place sequential compression device  Until discontinued         02/09/25 0821                  Cardiology will sign off  Follow with Cardiology as outpatient    Ajith Hutchins MD  Cardiology  [facility]

## 2025-02-11 NOTE — SUBJECTIVE & OBJECTIVE
Review of Systems   Cardiovascular:  Negative for chest pain, claudication, dyspnea on exertion, irregular heartbeat, leg swelling, near-syncope, orthopnea, palpitations, paroxysmal nocturnal dyspnea and syncope.   Respiratory:  Negative for cough, shortness of breath, snoring and sputum production.    Gastrointestinal:  Negative for abdominal pain, dysphagia, heartburn, nausea and vomiting.   Neurological:  Negative for dizziness, headaches, loss of balance and weakness.     Objective:     Vital Signs (Most Recent):  Temp: 98.6 °F (37 °C) (02/11/25 1445)  Pulse: 92 (02/11/25 1445)  Resp: 18 (02/11/25 1445)  BP: 112/61 (02/11/25 1445)  SpO2: 96 % (02/11/25 1445) Vital Signs (24h Range):  Temp:  [97.6 °F (36.4 °C)-98.7 °F (37.1 °C)] 98.6 °F (37 °C)  Pulse:  [] 92  Resp:  [18-20] 18  SpO2:  [89 %-97 %] 96 %  BP: ()/(57-78) 112/61     Weight: 69.4 kg (153 lb)  Body mass index is 23.96 kg/m².     SpO2: 96 %         Intake/Output Summary (Last 24 hours) at 2/11/2025 1729  Last data filed at 2/11/2025 0613  Gross per 24 hour   Intake 226.2 ml   Output --   Net 226.2 ml       Lines/Drains/Airways       Peripheral Intravenous Line  Duration                  Hemodialysis AV Fistula Right forearm -- days         Peripheral IV - Single Lumen 02/09/25 0706 20 G No Left Antecubital 2 days         Peripheral IV - Single Lumen 02/09/25 1549 20 G Left;Anterior Forearm 2 days                       Physical Exam  HENT:      Head: Normocephalic and atraumatic.      Mouth/Throat:      Mouth: Mucous membranes are moist.   Eyes:      Extraocular Movements: Extraocular movements intact.      Pupils: Pupils are equal, round, and reactive to light.   Cardiovascular:      Rate and Rhythm: Normal rate and regular rhythm.      Pulses: Normal pulses.      Heart sounds: Normal heart sounds.   Pulmonary:      Effort: Pulmonary effort is normal.      Breath sounds: Normal breath sounds.   Abdominal:      General: Bowel sounds are  normal.      Palpations: Abdomen is soft.   Musculoskeletal:      Left lower leg: No edema.   Skin:     General: Skin is warm.   Neurological:      General: No focal deficit present.      Mental Status: He is alert.   Psychiatric:         Mood and Affect: Mood normal.         Behavior: Behavior normal.            Current Medications:   aspirin  81 mg Oral Daily    atorvastatin  80 mg Oral Daily    carvediloL  25 mg Oral BID WM    clopidogreL  75 mg Oral Daily    ezetimibe  10 mg Oral Daily    hydrALAZINE  25 mg Oral Q8H    isosorbide mononitrate  120 mg Oral Daily    minoxidiL  10 mg Oral BID    mupirocin   Nasal BID    NIFEdipine  90 mg Oral Daily    polyethylene glycol  17 g Oral Daily    ranolazine  500 mg Oral Daily    sevelamer carbonate  1,600 mg Oral TID WM      heparin (porcine) in D5W  0-40 Units/kg/hr Intravenous Continuous 11.1 mL/hr at 02/10/25 1352 16 Units/kg/hr at 02/10/25 1352       Current Facility-Administered Medications:     0.9% NaCl, , Intravenous, PRN    acetaminophen, 650 mg, Oral, Q4H PRN    dextrose 50%, 12.5 g, Intravenous, PRN    dextrose 50%, 25 g, Intravenous, PRN    glucagon (human recombinant), 1 mg, Intramuscular, PRN    glucose, 16 g, Oral, PRN    glucose, 24 g, Oral, PRN    heparin (PORCINE), 57.6 Units/kg, Intravenous, PRN    heparin (PORCINE), 30 Units/kg, Intravenous, PRN    melatonin, 6 mg, Oral, Nightly PRN    naloxone, 0.02 mg, Intravenous, PRN    ondansetron, 4 mg, Intravenous, Q8H PRN    sodium chloride 0.9%, 10 mL, Intravenous, Q12H PRN    sodium chloride 0.9%, 10 mL, Intravenous, PRN    Laboratory (all labs reviewed):  CBC:  Recent Labs   Lab 12/30/24  0333 01/02/25  0000 02/05/25  0000 02/09/25  0531 02/09/25  1442 02/10/25  0026 02/11/25  0725   WBC 8.95  --   --  9.19 10.52 13.65 H  13.65 H 14.46 H  14.46 H   Hemoglobin 9.5 L   < > 10.7 L 11.9 L 13.8 L 13.4 L  13.4 L 11.1 L  11.1 L   Hematocrit 28.9 L  --   --  38.7 L 43.4 41.6  41.6 34.6 L  34.6 L   Platelets  334  --   --  287 298 335  335 325  325    < > = values in this interval not displayed.       CHEMISTRIES:  Recent Labs   Lab 05/19/22  1123 05/19/22  1752 05/20/22  0754 06/13/22  1702 06/14/22  0449 11/30/22  0951 12/29/24  0506 12/30/24  0333 02/09/25  0531 02/10/25  0026 02/10/25  2020 02/11/25  0725   Glucose 93 178 H 97 85 94   < > 126 H 114 H 177 H 140 H 114 H 151 H   Sodium 140 137 138 140 133 L   < > 130 L 129 L 138 138 135 L 135 L   Potassium 3.8 4.4 4.7 4.1 4.2   < > 4.2 4.2 4.5 4.9 5.2 H 5.0   BUN 29 H 32 H 44 H 46 H 29 H   < > 61 H 72 H 52 H 61 H 74 H 85 H   Blood Urea Nitrogen  --   --   --   --   --    < >  --   --   --   --   --   --    Creatinine 8.9 H 9.3 H 11.0 H 12.8 H 8.5 H   < > 11.1 H 14.1 H 9.8 H 11.3 H 12.7 H 14.1 H   eGFR if non  6.5 A 6.1 A 5.0 A 4.2 A 6.9 A  --   --   --   --   --   --   --    eGFR  --   --   --   --   --    < > 5.2 A 3.9 A 6 A 5 A 4 A 4 A   Calcium 8.5 L 8.3 L 8.8 8.9 9.1   < > 8.6 L 8.3 L 9.7 10.1 9.3 8.8   Magnesium 2.0  --  2.0 2.0 2.1   < > 1.9 2.0 2.1 2.1  --  2.2    < > = values in this interval not displayed.       CARDIAC BIOMARKERS:  Recent Labs   Lab 02/10/25  0843 02/10/25  1320 02/10/25  1842 02/11/25  0131 02/11/25  0725   Troponin I 39.120 H 43.394 H 46.394 H 39.767 H 40.738 H       COAGS:  Recent Labs   Lab 10/30/24  2301 12/27/24  1909 02/09/25  0531 02/09/25  1731 02/10/25  0947   INR 1.1 1.0 1.1 1.1 1.1       LIPIDS/LFTS:  Recent Labs   Lab 05/02/23  0324 11/27/23  0340 03/11/24  1812 03/12/24  0603 09/02/24  1748 09/02/24  2028 12/29/24  0506 12/30/24  0333 02/09/25  0531 02/10/25  0026 02/11/25  0725   Cholesterol 144  --  96 L  --  131  --   --   --   --  110 L  --    Triglycerides 94  --  57  --  83  --   --   --   --  46  --    HDL 51  --  51  --  67  --   --   --   --  58  --    LDL Cholesterol 74.2  --  33.6 L  --  47.4 L  --   --   --   --  42.8 L  --    Non-HDL Cholesterol 93  --  45  --  64  --   --   --   --  52  --    AST   --    < >  --    < >  --    < > 18 13 11 78 H 22   ALT  --    < >  --    < >  --    < > 7 L 5 L 9 L 14 9 L    < > = values in this interval not displayed.       BNP:  Recent Labs   Lab 09/02/24  1748 10/28/24  0330 10/30/24  1253 12/27/24  0356 02/09/25  0531   BNP 1,956 H 817 H 400 H 1,322 H 610 H       TSH:  Recent Labs   Lab 02/09/25  0531   TSH 1.677       Free T4:

## 2025-02-11 NOTE — PLAN OF CARE
Case Management Final Discharge Note    Discharge Disposition: Home     New DME ordered / company name: None     Relevant SDOH / Transition of Care Barriers:  None     Person available to provide assistance at home when needed and their contact information: Extended Emergency Contact Information  Primary Emergency Contact: Gabby Dickinson  Address: [address] [place] [address] [facility]  Mobile Phone: [phone]  Relation: Mother  Secondary Emergency Contact: Ariadne Dickinson  Mobile Phone: [phone]  Relation: Sister     Scheduled followup appointment: PCP- 02/27/25    Transportation: Patient's daughter will provide transportation home.     Patient and family educated on discharge services and updated on DC plan. Bedside RN Iveth Malcolm notified, patient clear to discharge from Case Management Perspective.     02/11/25 1457   Final Note   Assessment Type Final Discharge Note   Anticipated Discharge Disposition Home   What phone number can be called within the next 1-3 days to see how you are doing after discharge? [phone]   Hospital Resources/Appts/Education Provided Appointments scheduled and added to AVS   Post-Acute Status   Discharge Delays None known at this time

## 2025-02-11 NOTE — PLAN OF CARE
Patient and oriented x4. Respirations even and unlabored. 02 at 3l via nasal cannula. No distress noted. Skin warm and dry. Iv x2. Heparin infusing at 16 units/kg/hr. No complications noted. Patient denies pain. Pain medication available as needed. Patient dialysis patient. Does not produce any urine. Av fistula to left arm. Positive for bruit and thrill. Patient has no complaints at this time. Bed in lowest position. Call bell within reach. Instructed to call for any needs.      Problem: Adult Inpatient Plan of Care  Goal: Plan of Care Review  Outcome: Progressing  Flowsheets (Taken 2/11/2025 0431)  Plan of Care Reviewed With: patient  Goal: Patient-Specific Goal (Individualized)  Outcome: Progressing  Goal: Absence of Hospital-Acquired Illness or Injury  Outcome: Progressing  Intervention: Identify and Manage Fall Risk  Flowsheets (Taken 2/11/2025 0431)  Safety Promotion/Fall Prevention:   assistive device/personal item within reach   bed alarm set   Fall Risk reviewed with patient/family   Fall Risk signage in place   nonskid shoes/socks when out of bed     Problem: Diabetes Comorbidity  Goal: Blood Glucose Level Within Targeted Range  Outcome: Progressing  Intervention: Monitor and Manage Glycemia  Flowsheets (Taken 2/11/2025 0431)  Glycemic Management: blood glucose monitored

## 2025-02-11 NOTE — PLAN OF CARE
Case Management Assessment     PCP: Mireya Larose MD   Pharmacy:   [facility]/pharmacy #[id number] - [place], [place] - [address]  [address] [place] [address]  Phone: [phone] Fax: [phone]       Patient Arrived From: Home   Existing Help at Home: Extended Emergency Contact Information  Primary Emergency Contact: Gabby Dickinson  Address: [address] [place] [address] [place]  Mobile Phone: [phone]  Relation: Mother  Secondary Emergency Contact: Ariadne Dickinson  Mobile Phone: [phone]  Relation: Sister     Barriers to Discharge: None     Discharge Plan:    A. Home    B. Home       CM met with patient at bedside to discuss discharge planning. Patient reside alone, uses a can and walker for mobility and independently completes his ADL's. Patient att[facility] for HD MWF     Patient's daughter will provide transportation home upon discharge.     02/11/25 9622   Discharge Assessment   Assessment Type Discharge Planning Assessment   Confirmed/corrected address, phone number and insurance Yes   Confirmed Demographics Correct on Facesheet   Source of Information patient   Communicated QUIN with patient/caregiver Yes   People in Home alone   Facility Arrived From: Home   Do you expect to return to your current living situation? Yes   Do you have help at home or someone to help you manage your care at home? No   Prior to hospitilization cognitive status: Alert/Oriented   Current cognitive status: Alert/Oriented   Walking or Climbing Stairs Difficulty yes   Walking or Climbing Stairs ambulation difficulty, requires equipment   Dressing/Bathing Difficulty no   Equipment Currently Used at Home none   Readmission within 30 days? No   Patient currently being followed by outpatient case management? No   Do you currently have service(s) that help you manage your care at home? No   Do you take prescription medications? Yes   Do you have  prescription coverage? Yes   Coverage [employer] Managed Medicare   Do you have any problems affording any of your prescribed medications? No   Is the patient taking medications as prescribed? yes   Who is going to help you get home at discharge? Daughter   How do you get to doctors appointments? car, drives self   Are you on dialysis? Yes   Dialysis Name and Scheduled days [facility] MWF   Do you take coumadin? No   Discharge Plan A Home with family   Discharge Plan B Home with family   DME Needed Upon Discharge  none   Discharge Plan discussed with: Patient   Transition of Care Barriers None   Physical Activity   On average, how many days per week do you engage in moderate to strenuous exercise (like a brisk walk)? 0 days   On average, how many minutes do you engage in exercise at this level? 0 min   Financial Resource Strain   How hard is it for you to pay for the very basics like food, housing, medical care, and heating? Not hard   Housing Stability   In the last 12 months, was there a time when you were not able to pay the mortgage or rent on time? N   At any time in the past 12 months, were you homeless or living in a shelter (including now)? N   Transportation Needs   Has the lack of transportation kept you from medical appointments, meetings, work or from getting things needed for daily living? No   Food Insecurity   Within the past 12 months, you worried that your food would run out before you got the money to buy more. Never true   Within the past 12 months, the food you bought just didn't last and you didn't have money to get more. Never true   Stress   Do you feel stress - tense, restless, nervous, or anxious, or unable to sleep at night because your mind is troubled all the time - these days? Not at all   Social Isolation   How often do you feel lonely or isolated from those around you?  Never   Alcohol Use   Q1: How often do you have a drink containing alcohol? Never   Q2: How many drinks  containing alcohol do you have on a typical day when you are drinking? None   Q3: How often do you have six or more drinks on one occasion? Never

## 2025-02-11 NOTE — NURSING
Returned to room via stretcher from PACU, moved to bed without incident, right groin cath site with dressing CDI,Informed patient that he will need to lay flat until 7 pm, verbalized understanding.

## 2025-02-11 NOTE — NURSING
Patient with history of esrd. Patient goes to [email or web address] every m,w,f. No consult to nephology. Notified Dr. Chan. Provider notified Dr. Hutchins(Cardiologist) and Michelle Villalobos(Nephology).

## 2025-02-11 NOTE — PLAN OF CARE
Problem: Adult Inpatient Plan of Care  Goal: Plan of Care Review  Outcome: Adequate for Care Transition  Goal: Patient-Specific Goal (Individualized)  Outcome: Adequate for Care Transition  Goal: Absence of Hospital-Acquired Illness or Injury  Outcome: Adequate for Care Transition  Goal: Optimal Comfort and Wellbeing  Outcome: Adequate for Care Transition  Goal: Readiness for Transition of Care  Outcome: Adequate for Care Transition     Problem: Diabetes Comorbidity  Goal: Blood Glucose Level Within Targeted Range  Outcome: Adequate for Care Transition     Problem: Wound  Goal: Optimal Coping  Outcome: Adequate for Care Transition  Goal: Optimal Functional Ability  Outcome: Adequate for Care Transition  Goal: Absence of Infection Signs and Symptoms  Outcome: Adequate for Care Transition  Goal: Improved Oral Intake  Outcome: Adequate for Care Transition  Goal: Optimal Pain Control and Function  Outcome: Adequate for Care Transition  Goal: Skin Health and Integrity  Outcome: Adequate for Care Transition  Goal: Optimal Wound Healing  Outcome: Adequate for Care Transition     Problem: Hemodialysis  Goal: Safe, Effective Therapy Delivery  Outcome: Adequate for Care Transition  Goal: Effective Tissue Perfusion  Outcome: Adequate for Care Transition  Goal: Absence of Infection Signs and Symptoms  Outcome: Adequate for Care Transition

## 2025-02-11 NOTE — NURSING
Returned to room via bed from dialysis, no distress noted, pressure dressing CDI to RFA fistula site.

## 2025-02-11 NOTE — NURSING
Patient's blood pressure 109/68. Patient has minoxidil scheduled. No parameters ordered. Notified Dr. Hernandez at this time. Ok to hold medication at this time.

## 2025-02-11 NOTE — CONSULTS
[facility] Surg  Nephrology  Consult Note    Patient Name: Haroldo Dickinson  MRN: [id number]  Admission Date: 2/9/2025  Hospital Length of Stay: 1 days  Attending Provider: Eric Felix MD   Primary Care Physician: Mireya Larose MD  Principal Problem:Coronary artery disease of native artery with stable angina pectoris    Inpatient consult to Nephrology  Consult performed by: Gabby Gee MD  Consult ordered by: Sarah Valencia NP  Reason for consult: ESRD        Subjective:     HPI: Mr. Dickinson is a 48 yo male with HTN, CAD s/p PCI, and ESRD who was admitted 2/9/25 with ACS. He underwent LHC; medical management was recommended. Nephrology consulted today for ESRD. Prior records obtained and reviewed. He receives HD MWF via RUE AVF at [facility] under the c/o Dr. Bryant. Treatment duration: 4 hours. EDW 69.9kg. He last received HD on Friday. Patient seen and examined during hemodialysis this morning. Tolerating treatment well. No complaints. Currently without chest pain.       Past Medical History:   Diagnosis Date    CAD (coronary artery disease), native coronary artery 11/21/2019    Cataract     Diabetes mellitus     Diabetic retinopathy     Dialysis patient     DM type 2 causing renal disease, not at goal     ESRD (end stage renal disease) started dialysis 01/2014 06/05/2014    History of colon polyps 02/10/2021    History of COVID-19 12/29/2022    Hyperparathyroidism, secondary renal 06/05/2014    Hypertension     NSTEMI (non-ST elevated myocardial infarction) 12/21/2013    Organ transplant candidate 06/05/2014    Palliative care encounter 10/29/2024    Pleural effusion 06/07/2024    Pneumonia     Post PTCA 08/26/2020    RCA H[facility]ARY 7-25-20    Renal cell carcinoma of right kidney     Renal hypertension     Stroke        Past Surgical History:   Procedure Laterality Date    ANGIOGRAM, CORONARY, WITH LEFT HEART CATHETERIZATION N/A 7/1/2021    Procedure: Angiogram, Coronary, with Left Heart Cath;   Surgeon: Miki Zendejas MD;  Location: [facility] CATH LAB;  Service: Cardiology;  Laterality: N/A;    ANGIOGRAM, CORONARY, WITH LEFT HEART CATHETERIZATION N/A 11/28/2023    Procedure: Angiogram, Coronary, with Left Heart Cath;  Surgeon: Noah Pendleton MD;  Location: [facility] CATH LAB;  Service: Cardiology;  Laterality: N/A;    COLONOSCOPY N/A 5/3/2017    Procedure: COLONOSCOPY;  Surgeon: Rufino Carpenter MD;  Location: [facility] ENDO (4TH FLR);  Service: Endoscopy;  Laterality: N/A;  pt states can only schedule on Wednesdays    COLONOSCOPY N/A 2/9/2021    Procedure: COLONOSCOPY;  Surgeon: Edil Wong MD;  Location: [facility] (4TH FLR);  Service: Endoscopy;  Laterality: N/A;  Dialysis MWF/ labwork day of procedure  right arm aceess  per Dr. Colin pt can hold Plavix 5 days prior see note- sm  COVID test on 2/6/21 at [facility] -     COLONOSCOPY N/A 2/10/2021    Procedure: COLONOSCOPY;  Surgeon: Robert Ayers MD;  Location: [facility] (4TH FLR);  Service: Endoscopy;  Laterality: N/A;  rescheduled due to poor bowel prep-BB  negative covid screening 2/6/21-BB  dialysis M-W-F-BB  okay to r/s for 2/9/21 and to hold Plavix per Dr. KIRAN Wong-BB  labs same day-BB    CORONARY STENT PLACEMENT N/A 7/25/2019    Procedure: INSERTION, STENT, CORONARY ARTERY;  Surgeon: Miki Zendejas MD;  Location: [facility] CATH LAB;  Service: Cardiology;  Laterality: N/A;    DIALYSIS FISTULA CREATION      FISTULOGRAM N/A 2/18/2019    Procedure: Fistulogram;  Surgeon: Yaneth De La Cruz MD;  Location: [facility] CATH LAB;  Service: Cardiology;  Laterality: N/A;    FISTULOGRAM Right 7/23/2019    Procedure: Fistulogram;  Surgeon: Oz Cordoba MD;  Location: [facility] CATH LAB;  Service: Cardiology;  Laterality: Right;    LAPAROSCOPIC ROBOT-ASSISTED SURGICAL REMOVAL OF KIDNEY USING DA JAIME XI Right 5/19/2022    Procedure: XI ROBOTIC NEPHRECTOMY;  Surgeon: Aidan Hendricks MD;  [address]  Service: Urology;  Laterality: Right;  3hrs    LAPAROSCOPIC  ROBOT-ASSISTED SURGICAL REMOVAL OF KIDNEY USING DA JAIME XI Left 1/5/2023    Procedure: XI ROBOTIC NEPHRECTOMY;  Surgeon: Aidan Hendricks MD;  Location: [facility] OR [address] [place] [address];  Service: Urology;  Laterality: Left;  4 hrs    LEFT HEART CATHETERIZATION Left 7/25/2019    Procedure: Left heart cath;  Surgeon: Miki Zendejas MD;  Location: [facility] CATH LAB;  Service: Cardiology;  Laterality: Left;    REPAIR  1/5/2023    Procedure: REPAIR; COLOTOMY;  Surgeon: Maikel Lamb MD;  Location: [address];  Service: General;;    RETINAL LASER PROCEDURE Bilateral 2018 or 2017    Dr. Rothman    VASCULAR SURGERY         Review of patient's allergies indicates:   Allergen Reactions    No known allergies      Current Facility-Administered Medications   Medication Frequency    0.9% NaCl infusion PRN    acetaminophen tablet 650 mg Q4H PRN    aspirin EC tablet 81 mg Daily    atorvastatin tablet 80 mg Daily    carvediloL tablet 25 mg BID WM    clopidogreL tablet 75 mg Daily    dextrose 50% injection 12.5 g PRN    dextrose 50% injection 25 g PRN    ezetimibe tablet 10 mg Daily    glucagon (human recombinant) injection 1 mg PRN    glucose chewable tablet 16 g PRN    glucose chewable tablet 24 g PRN    heparin 25,000 units in dextrose 5% (100 units/ml) IV bolus from bag LOW INTENSITY nomogram - [facility] PRN    heparin 25,000 units in dextrose 5% (100 units/ml) IV bolus from bag LOW INTENSITY nomogram - [facility] PRN    heparin 25,000 units in dextrose 5% 250 mL (100 units/mL) infusion LOW INTENSITY nomogram - [facility] Continuous    hydrALAZINE tablet 25 mg Q8H    isosorbide mononitrate 24 hr tablet 120 mg Daily    melatonin tablet 6 mg Nightly PRN    minoxidiL tablet 10 mg BID    mupirocin 2 % ointment BID    naloxone 0.4 mg/mL injection 0.02 mg PRN    NIFEdipine 24 hr tablet 90 mg Daily    ondansetron injection 4 mg Q8H PRN    polyethylene glycol packet 17 g Daily    ranolazine 12 hr tablet 500 mg Daily    sevelamer carbonate tablet 1,600 mg TID WM     sodium chloride 0.9% flush 10 mL Q12H PRN    sodium chloride 0.9% flush 10 mL PRN     Facility-Administered Medications Ordered in Other Encounters   Medication Frequency    lidocaine (PF) 10 mg/ml (1%) injection 10 mg Once     Family History       Problem Relation (Age of Onset)    Cancer Maternal Grandfather    Cataracts Mother    Diabetes Mother    Heart disease Brother    Hypertension Mother, Father, Sister, Brother    Kidney disease Brother          Tobacco Use    Smoking status: Never    Smokeless tobacco: Never   Substance and Sexual Activity    Alcohol use: Not Currently     Comment: One drink a month    Drug use: No    Sexual activity: Yes     Partners: Female     Birth control/protection: None     Review of Systems   All other systems reviewed and are negative.    Objective:     Vital Signs (Most Recent):  Temp: 98.3 °F (36.8 °C) (02/11/25 0755)  Pulse: 92 (02/11/25 1104)  Resp: 18 (02/11/25 0856)  BP: 103/69 (02/11/25 0755)  SpO2: 96 % (02/11/25 0856) Vital Signs (24h Range):  Temp:  [97.6 °F (36.4 °C)-98.7 °F (37.1 °C)] 98.3 °F (36.8 °C)  Pulse:  [] 92  Resp:  [18-24] 18  SpO2:  [89 %-97 %] 96 %  BP: ()/(64-78) 103/69     Weight: 69.4 kg (153 lb) (02/10/25 1143)  Body mass index is 23.96 kg/m².  Body surface area is 1.81 meters squared.    I/O last 3 completed shifts:  In: 515.3 [P.O.:120; I.V.:374.5; IV Piggyback:20.8]  Out: -      Physical Exam  Vitals and nursing note reviewed.   Constitutional:       General: He is awake. He is not in acute distress.     Appearance: Normal appearance. He is well-developed.   HENT:      Head: Normocephalic and atraumatic.      Nose: Nose normal.      Mouth/Throat:      Mouth: Mucous membranes are moist.   Eyes:      Extraocular Movements: Extraocular movements intact.      Conjunctiva/sclera: Conjunctivae normal.   Cardiovascular:      Rate and Rhythm: Normal rate and regular rhythm.      Arteriovenous access: Right arteriovenous access is  present.  Pulmonary:      Effort: Pulmonary effort is normal.      Breath sounds: Normal breath sounds.   Abdominal:      General: There is no distension.      Palpations: Abdomen is soft.   Musculoskeletal:         General: No tenderness or signs of injury.      Right lower leg: No edema.      Left lower leg: No edema.   Skin:     General: Skin is warm and dry.      Findings: No erythema or rash.   Neurological:      General: No focal deficit present.      Mental Status: He is alert. Mental status is at baseline.   Psychiatric:         Mood and Affect: Mood normal.         Behavior: Behavior normal.          Significant Labs:  CBC:   Recent Labs   Lab 02/11/25  0725   WBC 14.46*  14.46*   RBC 3.58*  3.58*   HGB 11.1*  11.1*   HCT 34.6*  34.6*     325   MCV 97  97   MCH 31.0  31.0   MCHC 32.1  32.1     CMP:   Recent Labs   Lab 02/11/25  0725   *   CALCIUM 8.8   ALBUMIN 2.8*   PROT 7.1   *   K 5.0   CO2 21*   CL 99   BUN 85*   CREATININE 14.1*   ALKPHOS 86   ALT 9*   AST 22   BILITOT 0.7     All labs within the past 24 hours have been reviewed.    Significant Imaging:  Labs: Reviewed  X-Ray: Reviewed    Assessment/Plan:     ESRD  - receives HD MWF at [facility] under the c/o Dr. Bryant  - nephrology was not alerted about patient until this morning  - HD today x 3 hours. Will use  (instead of 400) in setting of ACS  - next HD planned for tomorrow; inpatient vs outpatient  - daily labs.     Anemia of CKD  - hgb at goal; no need for LONNY at this time    Secondary HPTH  - CCa normal; phos 6.6  - continue phos binders  - renal diet      Thank you for your consult. I will follow-up with patient. Please contact us if you have any additional questions.    Gabby Gee MD  Nephrology  [facility]

## 2025-02-11 NOTE — HPI
Mr. Dickinson is a 50 yo male with HTN, CAD s/p PCI, and ESRD who was admitted 2/9/25 with ACS. He underwent LHC; medical management was recommended. Nephrology consulted today for ESRD. Prior records obtained and reviewed. He receives HD MWF via RUE AVF at [facility] under the c/o Dr. Bryant. Treatment duration: 4 hours. EDW 69.9kg. He last received HD on Friday. Patient seen and examined during hemodialysis this morning. Tolerating treatment well. No complaints. Currently without chest pain.

## 2025-02-11 NOTE — NURSING
Patient's ptt results available at this time. Ptt is 44.5. ptt in therapeutic range. No changes made to heparin infusion. Heparin infusing at 16 units/kg/hr. Next ptt to be drawn at 0730.

## 2025-02-11 NOTE — SUBJECTIVE & OBJECTIVE
Past Medical History:   Diagnosis Date    CAD (coronary artery disease), native coronary artery 11/21/2019    Cataract     Diabetes mellitus     Diabetic retinopathy     Dialysis patient     DM type 2 causing renal disease, not at goal     ESRD (end stage renal disease) started dialysis 01/2014 06/05/2014    History of colon polyps 02/10/2021    History of COVID-19 12/29/2022    Hyperparathyroidism, secondary renal 06/05/2014    Hypertension     NSTEMI (non-ST elevated myocardial infarction) 12/21/2013    Organ transplant candidate 06/05/2014    Palliative care encounter 10/29/2024    Pleural effusion 06/07/2024    Pneumonia     Post PTCA 08/26/2020    RCA [facility] 7-25-20    Renal cell carcinoma of right kidney     Renal hypertension     Stroke        Past Surgical History:   Procedure Laterality Date    ANGIOGRAM, CORONARY, WITH LEFT HEART CATHETERIZATION N/A 7/1/2021    Procedure: Angiogram, Coronary, with Left Heart Cath;  Surgeon: Miki Zendejas MD;  Location: [facility] CATH LAB;  Service: Cardiology;  Laterality: N/A;    ANGIOGRAM, CORONARY, WITH LEFT HEART CATHETERIZATION N/A 11/28/2023    Procedure: Angiogram, Coronary, with Left Heart Cath;  Surgeon: Noah Pendleton MD;  Location: [facility] CATH LAB;  Service: Cardiology;  Laterality: N/A;    COLONOSCOPY N/A 5/3/2017    Procedure: COLONOSCOPY;  Surgeon: Rufino Carpenter MD;  [facility]  Service: Endoscopy;  Laterality: N/A;  pt states can only schedule on Wednesdays    COLONOSCOPY N/A 2/9/2021    Procedure: COLONOSCOPY;  Surgeon: Edil Wong MD;  [facility] ([facility]);  Service: Endoscopy;  Laterality: N/A;  Dialysis MWF/ labwork day of procedure  right arm aceess  per Dr. Colin pt can hold Plavix 5 days prior see note- sm  COVID test on 2/6/21 at [facility] - sm    COLONOSCOPY N/A 2/10/2021    Procedure: COLONOSCOPY;  Surgeon: Robert Ayers MD;  [facility]  Service: Endoscopy;  Laterality: N/A;  rescheduled due to poor  bowel prep-BB  negative covid screening 2/6/21-BB  dialysis M-W-F-BB  okay to r/s for 2/9/21 and to hold Plavix per Dr. KIRAN Wong-BB  labs same day-BB    CORONARY STENT PLACEMENT N/A 7/25/2019    Procedure: INSERTION, STENT, CORONARY ARTERY;  Surgeon: Miki Zendejas MD;  Location: [facility] CATH LAB;  Service: Cardiology;  Laterality: N/A;    DIALYSIS FISTULA CREATION      FISTULOGRAM N/A 2/18/2019    Procedure: Fistulogram;  Surgeon: Yaneth De La Cruz MD;  Location: [facility] CATH LAB;  Service: Cardiology;  Laterality: N/A;    FISTULOGRAM Right 7/23/2019    Procedure: Fistulogram;  Surgeon: Oz Cordoba MD;  Location: [facility] CATH LAB;  Service: Cardiology;  Laterality: Right;    LAPAROSCOPIC ROBOT-ASSISTED SURGICAL REMOVAL OF KIDNEY USING DA JAIME XI Right 5/19/2022    Procedure: XI ROBOTIC NEPHRECTOMY;  Surgeon: Aidan Hendricks MD;  Location: [address];  Service: Urology;  Laterality: Right;  3hrs    LAPAROSCOPIC ROBOT-ASSISTED SURGICAL REMOVAL OF KIDNEY USING DA JAIME XI Left 1/5/2023    Procedure: XI ROBOTIC NEPHRECTOMY;  Surgeon: Aidan Hendricks MD;  [facility];  Service: Urology;  Laterality: Left;  4 hrs    LEFT HEART CATHETERIZATION Left 7/25/2019    Procedure: Left heart cath;  Surgeon: Miki Zendejas MD;  Location: [facility] CATH LAB;  Service: Cardiology;  Laterality: Left;    REPAIR  1/5/2023    Procedure: REPAIR; COLOTOMY;  Surgeon: Maikel Lamb MD;  Location: [address];  Service: General;;    RETINAL LASER PROCEDURE Bilateral 2018 or 2017    Dr. Rothman    VASCULAR SURGERY         Review of patient's allergies indicates:   Allergen Reactions    No known allergies      Current Facility-Administered Medications   Medication Frequency    0.9% NaCl infusion PRN    acetaminophen tablet 650 mg Q4H PRN    aspirin EC tablet 81 mg Daily    atorvastatin tablet 80 mg Daily    carvediloL tablet 25 mg BID WM    clopidogreL tablet 75 mg Daily    dextrose 50% injection 12.5 g PRN     dextrose 50% injection 25 g PRN    ezetimibe tablet 10 mg Daily    glucagon (human recombinant) injection 1 mg PRN    glucose chewable tablet 16 g PRN    glucose chewable tablet 24 g PRN    heparin 25,000 units in dextrose 5% (100 units/ml) IV bolus from bag LOW INTENSITY nomogram - [facility] PRN    heparin 25,000 units in dextrose 5% (100 units/ml) IV bolus from bag LOW INTENSITY nomogram - [facility] PRN    heparin 25,000 units in dextrose 5% 250 mL (100 units/mL) infusion LOW INTENSITY nomogram - [facility] Continuous    hydrALAZINE tablet 25 mg Q8H    isosorbide mononitrate 24 hr tablet 120 mg Daily    melatonin tablet 6 mg Nightly PRN    minoxidiL tablet 10 mg BID    mupirocin 2 % ointment BID    naloxone 0.4 mg/mL injection 0.02 mg PRN    NIFEdipine 24 hr tablet 90 mg Daily    ondansetron injection 4 mg Q8H PRN    polyethylene glycol packet 17 g Daily    ranolazine 12 hr tablet 500 mg Daily    sevelamer carbonate tablet 1,600 mg TID WM    sodium chloride 0.9% flush 10 mL Q12H PRN    sodium chloride 0.9% flush 10 mL PRN     Facility-Administered Medications Ordered in Other Encounters   Medication Frequency    lidocaine (PF) 10 mg/ml (1%) injection 10 mg Once     Family History       Problem Relation (Age of Onset)    Cancer Maternal Grandfather    Cataracts Mother    Diabetes Mother    Heart disease Brother    Hypertension Mother, Father, Sister, Brother    Kidney disease Brother          Tobacco Use    Smoking status: Never    Smokeless tobacco: Never   Substance and Sexual Activity    Alcohol use: Not Currently     Comment: One drink a month    Drug use: No    Sexual activity: Yes     Partners: Female     Birth control/protection: None     Review of Systems   All other systems reviewed and are negative.    Objective:     Vital Signs (Most Recent):  Temp: 98.3 °F (36.8 °C) (02/11/25 0755)  Pulse: 92 (02/11/25 1104)  Resp: 18 (02/11/25 0856)  BP: 103/69 (02/11/25 0755)  SpO2: 96 % (02/11/25 0856) Vital Signs (24h  Range):  Temp:  [97.6 °F (36.4 °C)-98.7 °F (37.1 °C)] 98.3 °F (36.8 °C)  Pulse:  [] 92  Resp:  [18-24] 18  SpO2:  [89 %-97 %] 96 %  BP: ()/(64-78) 103/69     Weight: 69.4 kg (153 lb) (02/10/25 1143)  Body mass index is 23.96 kg/m².  Body surface area is 1.81 meters squared.    I/O last 3 completed shifts:  In: 515.3 [P.O.:120; I.V.:374.5; IV Piggyback:20.8]  Out: -      Physical Exam  Vitals and nursing note reviewed.   Constitutional:       General: He is awake. He is not in acute distress.     Appearance: Normal appearance. He is well-developed.   HENT:      Head: Normocephalic and atraumatic.      Nose: Nose normal.      Mouth/Throat:      Mouth: Mucous membranes are moist.   Eyes:      Extraocular Movements: Extraocular movements intact.      Conjunctiva/sclera: Conjunctivae normal.   Cardiovascular:      Rate and Rhythm: Normal rate and regular rhythm.      Arteriovenous access: Right arteriovenous access is present.  Pulmonary:      Effort: Pulmonary effort is normal.      Breath sounds: Normal breath sounds.   Abdominal:      General: There is no distension.      Palpations: Abdomen is soft.   Musculoskeletal:         General: No tenderness or signs of injury.      Right lower leg: No edema.      Left lower leg: No edema.   Skin:     General: Skin is warm and dry.      Findings: No erythema or rash.   Neurological:      General: No focal deficit present.      Mental Status: He is alert. Mental status is at baseline.   Psychiatric:         Mood and Affect: Mood normal.         Behavior: Behavior normal.          Significant Labs:  CBC:   Recent Labs   Lab 02/11/25  0725   WBC 14.46*  14.46*   RBC 3.58*  3.58*   HGB 11.1*  11.1*   HCT 34.6*  34.6*     325   MCV 97  97   MCH 31.0  31.0   MCHC 32.1  32.1     CMP:   Recent Labs   Lab 02/11/25  0725   *   CALCIUM 8.8   ALBUMIN 2.8*   PROT 7.1   *   K 5.0   CO2 21*   CL 99   BUN 85*   CREATININE 14.1*   ALKPHOS 86   ALT 9*    AST 22   BILITOT 0.7     All labs within the past 24 hours have been reviewed.    Significant Imaging:  Labs: Reviewed  X-Ray: Reviewed

## 2025-02-11 NOTE — DISCHARGE INSTRUCTIONS
Our goal at [facility] is to always give you outstanding care and exceptional service. You may receive a survey by mail, text or e-mail in the next 7-10 days from Ganesh Brewer and our leadership team asking about the care you received with us. The survey should only take 5-10 minutes to complete and is very important to us.     Your feedback provides us with a way to recognize our staff who work tirelessly to provide the best care! Also, your responses help us learn how to improve when your experience was below our aspiration of excellence. We WILL use your feedback to continue making improvements to help us provide the highest quality care. We keep your personal information and feedback confidential. We appreciate your time completing this survey and can't wait to hear from you!!!     We want you to leave us today feeling like you can DEFINITELY recommend us to others! We look forward to your continued care with us! Thanks so much for choosing [facility] for your healthcare needs!

## 2025-02-11 NOTE — PROGRESS NOTES
AVS virtually reviewed with patient in its entirety with emphasis on diet, medications, follow-up appointments and reasons to return to the ED or contact the [facility] On Call Nurse Care Line. Patient also encouraged to utilize their patient portal. Ease and convenience of use reiterated. Education complete and patient voiced understanding. All questions answered. Discharge teaching complete. Encouraged to complete patient survey.  Encouraged to activated patient portal.

## 2025-02-11 NOTE — ASSESSMENT & PLAN NOTE
Blood pressure stable  Continue hydralazine 25 mg t.i.d.  Continue carvedilol 25 mg b.i.d.  Continue nifedipine 90 mg daily  Continue Imdur 120 mg daily  Continue minoxidil 10 mg b.i.d.

## 2025-02-11 NOTE — ASSESSMENT & PLAN NOTE
Coronary angiography done yesterday showed severe multivessel coronary artery disease not amenable to PCI  Echocardiogram showed low-normal EF     Continue with maximal medical therapy for coronary artery disease  Continue aspirin 81 mg, Plavix 75 mg and high-intensity statin along with Zetia 10 mg daily  Continue with Ranexa 500 mg b.i.d. (can not up titrate to 1 g b.i.d. given end-stage renal disease)    Follow with Cardiology as an outpatient

## 2025-02-11 NOTE — HOSPITAL COURSE
Patient was seen and examined this morning.  Underwent coronary angiography via right common femoral arterial access yesterday.  Coronary angiography revealed severe multivessel coronary artery disease.  CAD has progressed since last coronary angiography from 2023  CAD not amenable to PCI or surgery as involve territories had transmural infarct on recent PET scan    Fortunately, he remains chest pain-free.  Troponin has plateaued.

## 2025-02-11 NOTE — PLAN OF CARE
Problem: Adult Inpatient Plan of Care  Goal: Plan of Care Review  2/11/2025 1632 by Lanette Louis RN  Outcome: Adequate for Care Transition  2/11/2025 1631 by Lanette Louis RN  Outcome: Adequate for Care Transition  Goal: Patient-Specific Goal (Individualized)  2/11/2025 1632 by Lanette Louis RN  Outcome: Adequate for Care Transition  2/11/2025 1631 by Lanette Louis RN  Outcome: Adequate for Care Transition  Goal: Absence of Hospital-Acquired Illness or Injury  2/11/2025 1632 by Lanette Louis RN  Outcome: Adequate for Care Transition  2/11/2025 1631 by Lanette Louis RN  Outcome: Adequate for Care Transition  Goal: Optimal Comfort and Wellbeing  2/11/2025 1632 by Lanette Louis RN  Outcome: Adequate for Care Transition  2/11/2025 1631 by Lanette Louis RN  Outcome: Adequate for Care Transition  Goal: Readiness for Transition of Care  2/11/2025 1632 by Lanette Louis RN  Outcome: Adequate for Care Transition  2/11/2025 1631 by Lanette Louis RN  Outcome: Adequate for Care Transition     Problem: Diabetes Comorbidity  Goal: Blood Glucose Level Within Targeted Range  2/11/2025 1632 by Lanette Louis RN  Outcome: Adequate for Care Transition  2/11/2025 1631 by Lanette Louis RN  Outcome: Adequate for Care Transition     Problem: Wound  Goal: Optimal Coping  2/11/2025 1632 by Lanette Louis RN  Outcome: Adequate for Care Transition  2/11/2025 1631 by Lanette Louis RN  Outcome: Adequate for Care Transition  Goal: Optimal Functional Ability  2/11/2025 1632 by Lanette Louis RN  Outcome: Adequate for Care Transition  2/11/2025 1631 by Lanette Louis RN  Outcome: Adequate for Care Transition  Goal: Absence of Infection Signs and Symptoms  2/11/2025 1632 by Lanette Louis RN  Outcome: Adequate for Care Transition  2/11/2025 1631 by Lanette Louis RN  Outcome: Adequate for Care Transition  Goal: Improved Oral Intake  2/11/2025 1632 by Lanette Louis RN  Outcome: Adequate for Care  Transition  2/11/2025 1631 by Lanette Louis RN  Outcome: Adequate for Care Transition  Goal: Optimal Pain Control and Function  2/11/2025 1632 by Lanette Louis RN  Outcome: Adequate for Care Transition  2/11/2025 1631 by Lanette Louis RN  Outcome: Adequate for Care Transition  Goal: Skin Health and Integrity  2/11/2025 1632 by Lanette Louis RN  Outcome: Adequate for Care Transition  2/11/2025 1631 by Lanette Louis RN  Outcome: Adequate for Care Transition  Goal: Optimal Wound Healing  2/11/2025 1632 by Lanette Louis RN  Outcome: Adequate for Care Transition  2/11/2025 1631 by Lanette Louis RN  Outcome: Adequate for Care Transition     Problem: Hemodialysis  Goal: Safe, Effective Therapy Delivery  2/11/2025 1632 by Lanette Louis RN  Outcome: Adequate for Care Transition  2/11/2025 1631 by Lanette Louis RN  Outcome: Adequate for Care Transition  Goal: Effective Tissue Perfusion  2/11/2025 1632 by Lanette Louis RN  Outcome: Adequate for Care Transition  2/11/2025 1631 by Lanette Louis RN  Outcome: Adequate for Care Transition  Goal: Absence of Infection Signs and Symptoms  2/11/2025 1632 by Lanette Louis RN  Outcome: Adequate for Care Transition  2/11/2025 1631 by Lanette Louis RN  Outcome: Adequate for Care Transition

## 2025-02-11 NOTE — DISCHARGE SUMMARY
[facility]  Discharge Summary      Patient Name: Haroldo Dickinson  MRN: [id number]  [facility]: [id number]  Patient Class: IP- Inpatient  Admission Date: 2/9/2025  Hospital Length of Stay: 1 days  Discharge Date and Time:  02/11/2025 11:46 AM  Attending Physician: Eric Felix MD   Discharging Provider: Sarah Valencia NP  Primary Care Provider: Mireya Larose MD    Primary Care Team:  Hosp Med ANGI 2    HPI:   Haroldo Dickinson is a.49 y.o. with a pmh of hypertension, hyperlipidemia, ESRD on HD (MWF), 3V CAD s/p PCI HILLARY of RCA in 2020 with [facility] in 2023 which shows  of % with mid LAD 90% and proximal LAD 75% stenosed presents to the hospital with complaints of substernal chest pain late last night while watching TV.  Patient describes his chest pain as my heart was racing fast.  Also associates some mild left arm weakness.  Patient states these symptoms lasted for just a short while and resolved on its own. Patient is on dialysis for ESRD and was last dialyzed on 02/07/2025 at [facility].  Per chart review, patient was at [facility] from 12/27/24 to 12/30/24 for similar complaints and admitted for NSTEMI which was secondary to demand from hypertensive emergency and hypervolemia in the setting of ESRD.  Per CTS note, there would be negligible benefit of surgical revascularization given fixed perfusion abnormality/transmural scarring on recent cardiac PET stress test and recommended continued medical management.  Patient denies any other alleviating exacerbating factors.  Patient denies headache, blurry vision, shortness on breath, nausea, vomiting, lower extremity swelling, hematemesis, hematochezia, hematuria, or any other associated symptoms.    In the ED: Patient afebrile without leukocytosis, hypertensive on presentation 150/78 otherwise hemodynamically stable, initial troponin 0.207, , CMP consistent with ESRD, COVID influenza negative, chest x-ray shows no acute cardiopulmonary process.   EKG in ED NSR and no STEMI. Patient given aspirin 325 mg, IV Dilaudid 0.5 mg, IV Dilaudid 1 mg, IV morphine 6 mg, and IV Zofran 4 mg in the ED. Patient placed under observation for further medical management.    Procedure(s) (LRB):  ANGIOGRAM, CORONARY ARTERY (N/A)  Left heart cath (Left)      Hospital Course:   Admitted chest pain. In the ED: Patient afebrile without leukocytosis, hypertensive on presentation 150/78 otherwise hemodynamically stable, initial troponin 0.207, , CMP consistent with ESRD, COVID influenza negative, chest x-ray shows no acute cardiopulmonary process. EKG in ED NSR and no STEMI. Cards consulted with recommendations of Coronary angiography from 11/2023 reviewed.  He does have a significant calcific lesion in mid left circumflex artery as well.  As area of transmural scarring involves distal left LAD and distal left circumflex artery, there may be a utility of revascularization of proximal LAD. Continue with maximal medical therapy. Continue aspirin 81 mg, Plavix 75 mg and high-intensity statin along with Zetia 10 mg daily  Continue with Ranexa 500 mg b.i.d. (can not up titrate to 1 g b.i.d. given end-stage renal disease). Continue to trend troponin until peak is reached.  Start heparin drip. Echocardiogram pending. Make him NPO past midnight in case revascularization is planned (after discussion with Interventional Cardiology at [facility]). Essential hypertension Blood pressure still not optimally controlled Start hydralazine 25 mg t.i.d., Continue carvedilol 25 mg b.i.d.,  nifedipine 90 mg daily, Imdur 120 mg daily, minoxidil 10 mg b.i.d. consulted nephrology for management of HD .   Coronary angiography 2/10/25:     Severe multivessel coronary artery disease is present    Distal left main had 30-40% calcific stenosis    Proximal LAD had 70-80% calcific stenosis and 100% stenosis in mid to distal portion.  Distal LAD was getting filled via septal collaterals    2nd diagonal  artery had 90% stenosis in midportion    Mid left circumflex artery had 40% calcific stenosis.  Stenosis severity can not be determined accurately due to presence of eccentric calcification    Mid RCA had 90% InStent restenosis followed by chronic total occlusion.  Distal RCA was getting filled via right to right collaterals and left-to-right collaterals    Right common femoral arterial access was closed with a Perclose device    Low normal LVEDP (7 mmHg)  Recommendations:  No culprit lesion was identified on current study  Given extensive multivessel coronary artery disease which is not amenable to revascularization (either PCI or bypass surgery - transmural scar in distal LAD/left circumflex/PLB noted on PET scan done in October, 2024) would recommend maximal medical therapy    Consulted nephrology for HD management    F/u outpatient with primary cardiologist        Goals of Care Treatment Preferences:  Code Status: Full Code    Health care agent: Ariadne Dickinson (sister)  Health care agent number: [phone]          What is most important right now is to focus on extending life as long as possible, even it it means sacrificing quality, curative/life-prolongation (regardless of treatment burdens).  Accordingly, we have decided that the best plan to meet the patient's goals includes continuing with treatment.      SDOH Screening:  The patient was screened for utility difficulties, food insecurity, transport difficulties, housing insecurity, and interpersonal safety and there were no concerns identified this admission.     Consults:   Consults (From admission, onward)          Status Ordering Provider     Inpatient consult to Nephrology  Once        Provider:  Indio Baker MD    Acknowledged JEFF BRENNAN     Inpatient consult to Cardiology  Once        Provider:  Ajith Hutchins MD    Completed SLADE CORCORAN              Final Active Diagnoses:    Diagnosis Date Noted POA    PRINCIPAL PROBLEM:  Coronary  artery disease of native artery with stable angina pectoris [I25.118] 02/09/2025 Yes    Leukocytosis [D72.829] 02/10/2025 Unknown    Triple vessel coronary artery disease [I25.10] 09/03/2024 Yes    H/o renal cell carcinoma of left kidney [C64.2] 04/04/2024 Yes    Type 2 diabetes mellitus with chronic kidney disease on chronic dialysis, without long-term current use of insulin [E11.22, N18.6, Z99.2] 03/10/2024 Not Applicable    Chronic diastolic heart failure [I50.32] 05/02/2023 Yes    Anemia in end-stage renal disease [N18.6, D63.1] 06/14/2022 Yes    S/p nephrectomy [Z90.5] 05/20/2022 Not Applicable    Essential hypertension [I10] 11/21/2019 Yes     Chronic    ESRD on hemodialysis [N18.6, Z99.2] 07/23/2019 Not Applicable    Hyperparathyroidism, secondary renal [N25.81] 06/05/2014 Yes      Problems Resolved During this Admission:       Discharged Condition: stable    Disposition: Home or Self Care    Follow Up:   Follow-up Information       Mireya Larose MD. Schedule an appointment as soon as possible for a visit in 7 day(s).    Specialty: Internal Medicine  Why: As needed  Contact information:  [address] [place] [address] [phone]               Luke Gonzalez Jr., MD. Schedule an appointment as soon as possible for a visit in 1 week(s).    Specialties: Interventional Cardiology, Cardiology  Contact information:  [address] [facility]  [address] [place] [id number] [phone]                           Patient Instructions:   No discharge procedures on file.    Significant Diagnostic Studies: Labs: BMP:   Recent Labs   Lab 02/10/25  0026 02/10/25  2020 02/11/25  0725   * 114* 151*    135* 135*   K 4.9 5.2* 5.0    100 99   CO2 19* 20* 21*   BUN 61* 74* 85*   CREATININE 11.3* 12.7* 14.1*   CALCIUM 10.1 9.3 8.8   MG 2.1  --  2.2   , CMP   Recent Labs   Lab 02/10/25  0026 02/10/25  2020 02/11/25  0725    135* 135*   K 4.9 5.2* 5.0    100 99   CO2 19* 20* 21*    * 114* 151*   BUN 61* 74* 85*   CREATININE 11.3* 12.7* 14.1*   CALCIUM 10.1 9.3 8.8   PROT 7.7  --  7.1   ALBUMIN 3.2*  --  2.8*   BILITOT 0.7  --  0.7   ALKPHOS 101  --  86   AST 78*  --  22   ALT 14  --  9*   ANIONGAP 18* 15 15   , CBC   Recent Labs   Lab 02/09/25  1442 02/10/25  0026 02/11/25  0725   WBC 10.52 13.65*  13.65* 14.46*  14.46*   HGB 13.8* 13.4*  13.4* 11.1*  11.1*   HCT 43.4 41.6  41.6 34.6*  34.6*    335  335 325  325   , INR   Lab Results   Component Value Date    INR 1.1 02/10/2025    INR 1.1 02/09/2025    INR 1.1 02/09/2025   , Lipid Panel   Lab Results   Component Value Date    CHOL 110 (L) 02/10/2025    HDL 58 02/10/2025    LDLCALC 42.8 (L) 02/10/2025    TRIG 46 02/10/2025    CHOLHDL 52.7 (H) 02/10/2025   , Troponin   Recent Labs   Lab 02/10/25  1842 02/11/25  0131 02/11/25  0725   TROPONINI 46.394* 39.767* 40.738*   , A1C:   Recent Labs   Lab 10/11/24  0000 01/08/25  0000   HGBA1C 4.3* 5.1   , and All labs within the past 24 hours have been reviewed    Pending Diagnostic Studies:       None           Medications:  Reconciled Home Medications:      Medication List        CHANGE how you take these medications      hydrALAZINE 25 MG tablet  Commonly known as: APRESOLINE  Take 1 tablet (25 mg total) by mouth every 8 (eight) hours.  What changed:   medication strength  how much to take  when to take this            CONTINUE taking these medications      aspirin 81 MG EC tablet  Commonly known as: ECOTRIN  Take 1 tablet (81 mg total) by mouth once daily.     atorvastatin 80 MG tablet  Commonly known as: LIPITOR  Take 1 tablet (80 mg total) by mouth once daily.     carvediloL 25 MG tablet  Commonly known as: COREG  Take 1 tablet (25 mg total) by mouth 2 (two) times daily with meals.     clopidogreL 75 mg tablet  Commonly known as: PLAVIX  Take 1 tablet (75 mg total) by mouth once daily.     epoetin philip 4,000 unit/mL injection  Commonly known as: PROCRIT  Inject 1.93 mLs  (7,720 Units total) into the skin every Mon, Wed, Fri.     ezetimibe 10 mg tablet  Commonly known as: ZETIA  Take 1 tablet (10 mg total) by mouth once daily.     isosorbide mononitrate 120 MG 24 hr tablet  Commonly known as: IMDUR  Take 1 tablet (120 mg total) by mouth once daily.     minoxidiL 10 MG Tab  Commonly known as: LONITEN  Take 1 tablet (10 mg total) by mouth 2 (two) times daily.     MIRCERA INJ  150 mcg.     NIFEdipine 90 MG (OSM) 24 hr tablet  Commonly known as: PROCARDIA-XL  Take 1 tablet (90 mg total) by mouth once daily.     nitroGLYCERIN 0.4 MG SL tablet  Commonly known as: NITROSTAT  Place 1 tablet (0.4 mg total) under the tongue every 5 (five) minutes as needed for Chest pain.     ranolazine 500 MG Tb12  Commonly known as: RANEXA  Take 1 tablet (500 mg total) by mouth Daily.     sevelamer carbonate 800 mg Tab  Commonly known as: RENVELA  Take 2 tablets (1,600 mg total) by mouth 3 (three) times daily with meals.            STOP taking these medications      ciclopirox 8 % Soln  Commonly known as: PENLAC     polyethylene glycol 17 gram/dose powder  Commonly known as: GLYCOLAX     RENAPLEX 800 mcg- 12.5 mg Tab  Generic drug: vit B complex-C-folic ac-zinc              Indwelling Lines/Drains at time of discharge:   Lines/Drains/Airways       Drain  Duration                  Hemodialysis AV Fistula Right forearm -- days                    Time spent on the discharge of patient: greater than 30 minutes         Sarah Valencia NP  Department of Hospital Medicine  [facility]

## 2025-02-11 NOTE — NURSING
[facility]  Nurses Note -- 4 Eyes      2/10/25      Skin assessed on: Admit      [x] No Pressure Injuries Present    [x]Prevention Measures Documented    [] Yes LDA  for Pressure Injury Previously documented     [] Yes New Pressure Injury Discovered   [] LDA for New Pressure Injury Added      Attending RN:  Gurvinder Barnes RN     Second RN:  Kimberli Malcolm Rn

## 2025-02-11 NOTE — NURSING
[facility]  Nurses Note -- 4 Eyes      2/11/2025       Skin assessed on: Q Shift      [x] No Pressure Injuries Present    []Prevention Measures Documented    [] Yes LDA  for Pressure Injury Previously documented     [] Yes New Pressure Injury Discovered   [] LDA for New Pressure Injury Added      Attending RN:  Iveth Malcolm, RN     Second RN:  DeniseRN

## 2025-02-12 NOTE — NURSING
Transported downstairs via wheelchair with belongings at side, no distress noted, safety maintained.

## 2025-02-14 ENCOUNTER — PATIENT OUTREACH (OUTPATIENT)
Dept: ADMINISTRATIVE | Facility: CLINIC | Age: 50
End: 2025-02-14
Payer: MEDICARE

## 2025-02-14 NOTE — PROGRESS NOTES
C3 nurse attempted to contact Haroldo Dickinson for a TCC post hospital discharge follow up call. No answer. No voicemail available. The patient has a scheduled HOSFU appointment with Mireya Larose MD on 02/27/25 @ 8999.

## 2025-02-14 NOTE — PHYSICIAN QUERY
Question: Due to conflicting clinical picture, please clarify the cardiac diagnosis.    Provider Query Response:  Coronary artery disease of native artery with stable angina pectoris

## 2025-02-14 NOTE — PROGRESS NOTES
C3 nurse spoke with Haroldo Dickinson for a TCC post hospital discharge follow up call. The patient has a scheduled [facility] appointment with Mireya Larose MD  on 02/27/25 @ 9148.          DC instructions

## 2025-02-27 ENCOUNTER — OFFICE VISIT (OUTPATIENT)
Dept: FAMILY MEDICINE | Facility: CLINIC | Age: 50
End: 2025-02-27
Payer: MEDICARE

## 2025-02-27 VITALS
DIASTOLIC BLOOD PRESSURE: 70 MMHG | HEIGHT: 67 IN | HEART RATE: 86 BPM | BODY MASS INDEX: 24.74 KG/M2 | WEIGHT: 157.63 LBS | OXYGEN SATURATION: 97 % | SYSTOLIC BLOOD PRESSURE: 100 MMHG | TEMPERATURE: 98 F | RESPIRATION RATE: 18 BRPM

## 2025-02-27 DIAGNOSIS — D63.1 ANEMIA IN END-STAGE RENAL DISEASE: ICD-10-CM

## 2025-02-27 DIAGNOSIS — N18.6 ANEMIA IN END-STAGE RENAL DISEASE: ICD-10-CM

## 2025-02-27 DIAGNOSIS — E11.22 TYPE 2 DIABETES MELLITUS WITH CHRONIC KIDNEY DISEASE ON CHRONIC DIALYSIS, WITHOUT LONG-TERM CURRENT USE OF INSULIN: ICD-10-CM

## 2025-02-27 DIAGNOSIS — I25.10 CORONARY ARTERY DISEASE INVOLVING NATIVE CORONARY ARTERY OF NATIVE HEART WITHOUT ANGINA PECTORIS: ICD-10-CM

## 2025-02-27 DIAGNOSIS — N18.6 ESRD ON HEMODIALYSIS: ICD-10-CM

## 2025-02-27 DIAGNOSIS — Z99.2 ESRD ON HEMODIALYSIS: ICD-10-CM

## 2025-02-27 DIAGNOSIS — Z99.2 TYPE 2 DIABETES MELLITUS WITH CHRONIC KIDNEY DISEASE ON CHRONIC DIALYSIS, WITHOUT LONG-TERM CURRENT USE OF INSULIN: ICD-10-CM

## 2025-02-27 DIAGNOSIS — I25.118 CORONARY ARTERY DISEASE OF NATIVE ARTERY OF NATIVE HEART WITH STABLE ANGINA PECTORIS: Primary | ICD-10-CM

## 2025-02-27 DIAGNOSIS — N18.6 TYPE 2 DIABETES MELLITUS WITH CHRONIC KIDNEY DISEASE ON CHRONIC DIALYSIS, WITHOUT LONG-TERM CURRENT USE OF INSULIN: ICD-10-CM

## 2025-02-27 DIAGNOSIS — S31.819D WOUND OF RIGHT BUTTOCK, SUBSEQUENT ENCOUNTER: ICD-10-CM

## 2025-02-27 DIAGNOSIS — I69.351 HEMIPARESIS AFFECTING RIGHT SIDE AS LATE EFFECT OF CEREBROVASCULAR ACCIDENT: ICD-10-CM

## 2025-02-27 PROBLEM — I16.1 HYPERTENSIVE EMERGENCY: Chronic | Status: RESOLVED | Noted: 2023-11-27 | Resolved: 2025-02-27

## 2025-02-27 PROBLEM — I24.9 ACUTE CORONARY SYNDROME WITH HIGH TROPONIN: Status: RESOLVED | Noted: 2024-03-11 | Resolved: 2025-02-27

## 2025-02-27 PROBLEM — E87.70 HYPERVOLEMIA: Status: RESOLVED | Noted: 2024-10-29 | Resolved: 2025-02-27

## 2025-02-27 PROBLEM — S31.819A WOUND OF RIGHT BUTTOCK: Status: ACTIVE | Noted: 2025-02-27

## 2025-02-27 PROBLEM — D72.829 LEUKOCYTOSIS: Status: RESOLVED | Noted: 2025-02-10 | Resolved: 2025-02-27

## 2025-02-27 PROBLEM — R52 PAIN: Status: RESOLVED | Noted: 2024-10-29 | Resolved: 2025-02-27

## 2025-02-27 PROBLEM — R06.00 DYSPNEA: Status: RESOLVED | Noted: 2024-10-29 | Resolved: 2025-02-27

## 2025-02-27 PROBLEM — I21.4 NSTEMI (NON-ST ELEVATED MYOCARDIAL INFARCTION): Status: RESOLVED | Noted: 2024-12-27 | Resolved: 2025-02-27

## 2025-02-27 PROBLEM — J96.01 ACUTE HYPOXEMIC RESPIRATORY FAILURE: Status: RESOLVED | Noted: 2024-09-02 | Resolved: 2025-02-27

## 2025-02-27 PROCEDURE — 99999 PR PBB SHADOW E&M-EST. PATIENT-LVL IV: CPT | Mod: PBBFAC,HCNC,, | Performed by: INTERNAL MEDICINE

## 2025-02-27 RX ORDER — TENAPANOR 31.9 MG/1
TABLET, FILM COATED ORAL
COMMUNITY
End: 2025-02-27 | Stop reason: SDUPTHER

## 2025-02-27 RX ORDER — NITROGLYCERIN 0.4 MG/1
0.4 TABLET SUBLINGUAL EVERY 5 MIN PRN
Qty: 25 TABLET | Refills: 1 | Status: ON HOLD | OUTPATIENT
Start: 2025-02-27

## 2025-02-27 RX ORDER — TENAPANOR 21.3 MG/1
1 TABLET, FILM COATED ORAL
Status: ON HOLD | COMMUNITY
Start: 2024-09-07

## 2025-02-27 NOTE — ASSESSMENT & PLAN NOTE
Coronary angiography 2025 showed severe multivessel coronary artery disease not amenable to PCI  echocardiogram results (EF 50-55% with grade 2 diastolic dysfunction)    - Continued hydralazine 25 mg 3 times daily.  - Refilled nitroglycerin 25 tablets with 1 refill; instructed patient to use as needed for chest pain.  - Noted intentionally low blood pressure to reduce heart strain; explained rationale and instructed patient to report dizziness or fainting.  - Checked for bleeding issues and instructed patient to report unusual bleeding or bruising.  - Scheduled follow-up cardiology appointment on the 25th of next month.  - cont ASA, plavix, statin, zetia and ranexa

## 2025-02-27 NOTE — ASSESSMENT & PLAN NOTE
- Evaluated management and confirmed patient is on kidney transplant waitlist but not a candidate for heart transplant.  - Continued tenapanor 20 mg orally twice daily with meals for phosphorus control.  - Noted regular dialysis treatment and administration of erythropoietin (EPO) injections during dialysis for anemia treatment.  - Explained purpose of EPO: to boost RBC production in chronic kidney disease.  - Instructed patient to report unusual fatigue or shortness of breath.

## 2025-02-27 NOTE — ASSESSMENT & PLAN NOTE
Controlled   Lab Results   Component Value Date    HGBA1C 5.1 01/08/2025   Was on meds 10 years ago  Now diet controlled   Cont to monitor   Will schedule eye cam

## 2025-02-27 NOTE — PROGRESS NOTES
Chief Complaint: Follow-up (Hospital follow up )      Haroldo Dickinson  is a 49 y.o. year old patient who presents today for     History of Present Illness    CHIEF COMPLAINT:  Haroldo presents today for follow up after recent hospital visit for cardiac issues.    CARDIOVASCULAR:  He denies current chest pain. Angiogram on the 10th showed blockages. He reports occasional use of nitroglycerin for chest pain and currently needs a refill.    RENAL:  He receives EPO injections during dialysis sessions on Monday, Wednesday, and Friday. He is currently on the kidney transplant waiting list.    MEDICATIONS:  Hydralazine dose was adjusted to 25mg 3 times daily. He takes phosphorus binder 20mg with meals.    PRESSURE INJURY:  He was evaluated at urgent care two weeks ago for a pressure injury on his right buttock. He reports the wound is healing and has dried up after ointment treatment.    MEDICAL HISTORY:  His history includes a stroke 10 years ago and diabetes, currently controlled without medication.      ROS:  General: -fever, -chills, -fatigue, -weight gain, -weight loss  Eyes: -vision changes, -redness, -discharge  ENT: -ear pain, -nasal congestion, -sore throat  Cardiovascular: -chest pain, -palpitations, -lower extremity edema  Respiratory: -cough, -shortness of breath  Gastrointestinal: -abdominal pain, -nausea, -vomiting, -diarrhea, -constipation, -blood in stool  Genitourinary: -dysuria, -hematuria, -frequency  Musculoskeletal: -joint pain, -muscle pain  Skin: -rash, +lesion  Neurological: -headache, -dizziness, -numbness, -tingling  Psychiatric: -anxiety, -depression, -sleep difficulty         Past Medical History:   Diagnosis Date    CAD (coronary artery disease), native coronary artery 11/21/2019    Cataract     Diabetes mellitus     Diabetic retinopathy     Dialysis patient     DM type 2 causing renal disease, not at goal     ESRD (end stage renal disease) started dialysis 01/2014 06/05/2014    H/o acute coronary  syndrome with high troponin 03/11/2024    History of colon polyps 02/10/2021    History of COVID-19 12/29/2022    Hyperparathyroidism, secondary renal 06/05/2014    Hypertension     Hypertensive emergency 11/27/2023    NSTEMI (non-ST elevated myocardial infarction) 12/21/2013    Organ transplant candidate 06/05/2014    Palliative care encounter 10/29/2024    Pleural effusion 06/07/2024    Pneumonia     Post PTCA 08/26/2020    RCA [facility] 7-25-20    Renal cell carcinoma of right kidney     Renal hypertension     Stroke        Past Surgical History:   Procedure Laterality Date    ANGIOGRAM, CORONARY, WITH LEFT HEART CATHETERIZATION N/A 7/1/2021    Procedure: Angiogram, Coronary, with Left Heart Cath;  Surgeon: Miki Zendejas MD;  Location: [facility] CATH LAB;  Service: Cardiology;  Laterality: N/A;    ANGIOGRAM, CORONARY, WITH LEFT HEART CATHETERIZATION N/A 11/28/2023    Procedure: Angiogram, Coronary, with Left Heart Cath;  Surgeon: Noah Pendleton MD;  Location: [facility] CATH LAB;  Service: Cardiology;  Laterality: N/A;    COLONOSCOPY N/A 5/3/2017    Procedure: COLONOSCOPY;  Surgeon: Rufino Carpenter MD;  Location: [facility] ENDO (4TH FLR);  Service: Endoscopy;  Laterality: N/A;  pt states can only schedule on Wednesdays    COLONOSCOPY N/A 2/9/2021    Procedure: COLONOSCOPY;  Surgeon: Edil Wong MD;  Location: [facility] ENDO (4TH FLR);  Service: Endoscopy;  Laterality: N/A;  Dialysis MWF/ labwork day of procedure  right arm aceess  per Dr. Colin pt can hold Plavix 5 days prior see note- sm  COVID test on 2/6/21 at [facility] - sm    COLONOSCOPY N/A 2/10/2021    Procedure: COLONOSCOPY;  Surgeon: Robert Ayers MD;  Location: [facility] ENDO (4TH FLR);  Service: Endoscopy;  Laterality: N/A;  rescheduled due to poor bowel prep-BB  negative covid screening 2/6/21-BB  dialysis M-W-F-BB  okay to r/s for 2/9/21 and to hold Plavix per Dr. KIRAN Wong-BB  labs same day-BB    CORONARY ANGIOGRAPHY N/A 2/10/2025    Procedure: ANGIOGRAM, CORONARY ARTERY;   Surgeon: Ajith Hutchins MD;  Location: [facility] CATH LAB;  Service: Cardiology;  Laterality: N/A;    CORONARY STENT PLACEMENT N/A 7/25/2019    Procedure: INSERTION, STENT, CORONARY ARTERY;  Surgeon: Miki Zendejas MD;  Location: [facility] CATH LAB;  Service: Cardiology;  Laterality: N/A;    DIALYSIS FISTULA CREATION      FISTULOGRAM N/A 2/18/2019    Procedure: Fistulogram;  Surgeon: Yaneth De La Cruz MD;  Location: [facility] CATH LAB;  Service: Cardiology;  Laterality: N/A;    FISTULOGRAM Right 7/23/2019    Procedure: Fistulogram;  Surgeon: Oz Cordoba MD;  Location: [facility] CATH LAB;  Service: Cardiology;  Laterality: Right;    LAPAROSCOPIC ROBOT-ASSISTED SURGICAL REMOVAL OF KIDNEY USING DA JAIME XI Right 5/19/2022    Procedure: XI ROBOTIC NEPHRECTOMY;  Surgeon: Aidan Hendricks MD;  Location: [address];  Service: Urology;  Laterality: Right;  3hrs    LAPAROSCOPIC ROBOT-ASSISTED SURGICAL REMOVAL OF KIDNEY USING DA JAIME XI Left 1/5/2023    Procedure: XI ROBOTIC NEPHRECTOMY;  Surgeon: Aidan Hendricks MD;  Location: [facility] OR [address] [place] [address];  Service: Urology;  Laterality: Left;  4 hrs    LEFT HEART CATHETERIZATION Left 7/25/2019    Procedure: Left heart cath;  Surgeon: Miki Zendejas MD;  Location: [facility] CATH LAB;  Service: Cardiology;  Laterality: Left;    LEFT HEART CATHETERIZATION Left 2/10/2025    Procedure: Left heart cath;  Surgeon: Ajith Hutchins MD;  Location: [facility] CATH LAB;  Service: Cardiology;  Laterality: Left;    REPAIR  1/5/2023    Procedure: REPAIR; COLOTOMY;  Surgeon: Maikel Lamb MD;  Location: [facility] OR [address] [place] [address];  Service: General;;    RETINAL LASER PROCEDURE Bilateral 2018 or 2017    Dr. Rothman    VASCULAR SURGERY          Family History   Problem Relation Name Age of Onset    Hypertension Mother      Diabetes Mother      Cataracts Mother      Hypertension Father      Hypertension Sister      Kidney disease Brother      Hypertension Brother      Heart disease Brother      Cancer  Maternal Grandfather          Colon CA    Colon cancer Neg Hx      Esophageal cancer Neg Hx      Stomach cancer Neg Hx      Rectal cancer Neg Hx      Ulcerative colitis Neg Hx      Irritable bowel syndrome Neg Hx      Crohn's disease Neg Hx      Celiac disease Neg Hx      Glaucoma Neg Hx      Macular degeneration Neg Hx          Social History     Socioeconomic History    Marital status: Single   Occupational History     Employer: [employer]   Tobacco Use    Smoking status: Never    Smokeless tobacco: Never   Substance and Sexual Activity    Alcohol use: Not Currently     Comment: One drink a month    Drug use: No    Sexual activity: Yes     Partners: Female     Birth control/protection: None   Social History Narrative    Disabled    Was     One son          Social Drivers of Health     Financial Resource Strain: Low Risk  (2/11/2025)    Overall Financial Resource Strain (CARDIA)     Difficulty of Paying Living Expenses: Not hard at all   Food Insecurity: No Food Insecurity (2/11/2025)    Hunger Vital Sign     Worried About Running Out of Food in the Last Year: Never true     Ran Out of Food in the Last Year: Never true   Transportation Needs: No Transportation Needs (2/10/2025)    TRANSPORTATION NEEDS     Transportation : No   Physical Activity: Inactive (2/11/2025)    Exercise Vital Sign     Days of Exercise per Week: 0 days     Minutes of Exercise per Session: 0 min   Stress: No Stress Concern Present (2/11/2025)    [employer] [place] [employer] - Occupational Stress Questionnaire     Feeling of Stress : Not at all   Housing Stability: Low Risk  (2/11/2025)    Housing Stability Vital Sign     Unable to Pay for Housing in the Last Year: No     Homeless in the Last Year: No       Current Medications[1]           Objective:      Vitals:    02/27/25 0930   BP: 100/70   Pulse: 86   Resp: 18   Temp: 98.1 °F (36.7 °C)       Physical Exam  Constitutional:       Appearance: Normal appearance.   HENT:       Head: Normocephalic and atraumatic.   Cardiovascular:      Rate and Rhythm: Normal rate.   Musculoskeletal:         General: Normal range of motion.   Skin:     General: Skin is warm and dry.   Neurological:      General: No focal deficit present.      Mental Status: He is alert and oriented to person, place, and time.          Assessment:       1. Coronary artery disease of native artery of native heart with stable angina pectoris    2. Coronary artery disease involving native coronary artery of native heart without angina pectoris    3. Type 2 diabetes mellitus with chronic kidney disease on chronic dialysis, without long-term current use of insulin    4. Hemiparesis affecting right side as late effect of cerebrovascular accident    5. ESRD on hemodialysis    6. Anemia in end-stage renal disease    7. Wound of right buttock, subsequent encounter          Plan:   1. Coronary artery disease of native artery of native heart with stable angina pectoris  Assessment & Plan:  Coronary angiography 2025 showed severe multivessel coronary artery disease not amenable to PCI  echocardiogram results (EF 50-55% with grade 2 diastolic dysfunction)    - Continued hydralazine 25 mg 3 times daily.  - Refilled nitroglycerin 25 tablets with 1 refill; instructed patient to use as needed for chest pain.  - Noted intentionally low blood pressure to reduce heart strain; explained rationale and instructed patient to report dizziness or fainting.  - Checked for bleeding issues and instructed patient to report unusual bleeding or bruising.  - Scheduled follow-up cardiology appointment on the 25th of next month.  - cont ASA, plavix, statin, zetia and ranexa    Orders:  -     nitroGLYCERIN (NITROSTAT) 0.4 MG SL tablet; Place 1 tablet (0.4 mg total) under the tongue every 5 (five) minutes as needed for Chest pain.  Dispense: 25 tablet; Refill: 1    2. Coronary artery disease involving native coronary artery of native heart without angina  pectoris    3. Type 2 diabetes mellitus with chronic kidney disease on chronic dialysis, without long-term current use of insulin  Assessment & Plan:  Controlled   Lab Results   Component Value Date    HGBA1C 5.1 01/08/2025   Was on meds 10 years ago  Now diet controlled   Cont to monitor   Will schedule eye cam    Orders:  -     Diabetic Eye Screening Photo; Future    4. Hemiparesis affecting right side as late effect of cerebrovascular accident  Assessment & Plan:  - Acknowledged patient's history of stroke from 10 years ago and its current impact.  - Recommend continued lifelong use of statin, aspirin and Plavix for blood thinning.  - Instructed patient to report any new or worsening neurological symptoms.      5. ESRD on hemodialysis  Assessment & Plan:  - Evaluated management and confirmed patient is on kidney transplant waitlist but not a candidate for heart transplant.  - Continued tenapanor 20 mg orally twice daily with meals for phosphorus control.  - Noted regular dialysis treatment and administration of erythropoietin (EPO) injections during dialysis for anemia treatment.  - Explained purpose of EPO: to boost RBC production in chronic kidney disease.  - Instructed patient to report unusual fatigue or shortness of breath.      6. Anemia in end-stage renal disease  Assessment & Plan:  Anemia is likely due to chronic disease due to ESRD. Most recent hemoglobin and hematocrit are listed below.  Recent Labs     02/26/25  0000   HGB 9.2*     Confirmed pt on EPO three times a week with HD        7. Wound of right buttock, subsequent encounter  Assessment & Plan:  - Educated patient on causes, prevention, and importance of movement to relieve pressure.  - Instructed patient to move and distribute pressure when sitting for prolonged periods, and recommended getting up and walking periodically.  - Assessed wound as healing and drying up.  - Prescribed antibiotic ointment for treatment and advised patient to keep  the area clean and dry.       DIABETES:  - Noted history of diabetes, now controlled without medication.  - Ordered eye cam exam to screen for diabetic retinopathy.  - Explained the purpose and importance of annual eye exams for patients with diabetes history.  - Instructed patient to schedule at their convenience.    WEIGHT MANAGEMENT:  - Advised patient to maintain a healthy diet and regular exercise routine.    FOLLOW UP:  - Follow up in 6 months.         Follow up in about 6 months (around 8/27/2025) for reschedule march appt to 6months, eye cam.    This note was generated with the assistance of ambient listening technology. Verbal consent was obtained by the patient and accompanying visitor(s) for the recording of patient appointment to facilitate this note. I attest to having reviewed and edited the generated note for accuracy, though some syntax or spelling errors may persist. Please contact the author of this note for any clarification.                [1]   Current Outpatient Medications:     aspirin (ECOTRIN) 81 MG EC tablet, Take 1 tablet (81 mg total) by mouth once daily., Disp: 30 tablet, Rfl: 11    atorvastatin (LIPITOR) 80 MG tablet, Take 1 tablet (80 mg total) by mouth once daily., Disp: 90 tablet, Rfl: 1    carvediloL (COREG) 25 MG tablet, Take 1 tablet (25 mg total) by mouth 2 (two) times daily with meals., Disp: 180 tablet, Rfl: 3    clopidogreL (PLAVIX) 75 mg tablet, Take 1 tablet (75 mg total) by mouth once daily., Disp: 90 tablet, Rfl: 3    epoetin philip (PROCRIT) 4,000 unit/mL injection, Inject 1.93 mLs (7,720 Units total) into the skin every Mon, Wed, Fri., Disp: , Rfl:     ezetimibe (ZETIA) 10 mg tablet, Take 1 tablet (10 mg total) by mouth once daily., Disp: 90 tablet, Rfl: 3    hydrALAZINE (APRESOLINE) 25 MG tablet, Take 1 tablet (25 mg total) by mouth every 8 (eight) hours., Disp: 90 tablet, Rfl: 11    isosorbide mononitrate (IMDUR) 120 MG 24 hr tablet, Take 1 tablet (120 mg total) by mouth  once daily., Disp: 90 tablet, Rfl: 1    methoxy peg-epoetin beta (MIRCERA INJ), 150 mcg., Disp: , Rfl:     minoxidiL (LONITEN) 10 MG Tab, Take 1 tablet (10 mg total) by mouth 2 (two) times daily., Disp: 60 tablet, Rfl: 1    NIFEdipine (PROCARDIA-XL) 90 MG (OSM) 24 hr tablet, Take 1 tablet (90 mg total) by mouth once daily., Disp: 90 tablet, Rfl: 3    ranolazine (RANEXA) 500 MG Tb12, Take 1 tablet (500 mg total) by mouth Daily., Disp: 30 tablet, Rfl: 1    sevelamer carbonate (RENVELA) 800 mg Tab, Take 2 tablets (1,600 mg total) by mouth 3 (three) times daily with meals., Disp: 180 tablet, Rfl: 11    tenapanor (XPHOZAH) 20 mg Tab, Take 1 tablet by mouth., Disp: , Rfl:     nitroGLYCERIN (NITROSTAT) 0.4 MG SL tablet, Place 1 tablet (0.4 mg total) under the tongue every 5 (five) minutes as needed for Chest pain., Disp: 25 tablet, Rfl: 1  No current facility-administered medications for this visit.    Facility-Administered Medications Ordered in Other Visits:     lidocaine (PF) 10 mg/ml (1%) injection 10 mg, 1 mL, Intradermal, Once, Michelle Spence MD

## 2025-02-27 NOTE — ASSESSMENT & PLAN NOTE
- Acknowledged patient's history of stroke from 10 years ago and its current impact.  - Recommend continued lifelong use of statin, aspirin and Plavix for blood thinning.  - Instructed patient to report any new or worsening neurological symptoms.

## 2025-02-27 NOTE — ASSESSMENT & PLAN NOTE
- Educated patient on causes, prevention, and importance of movement to relieve pressure.  - Instructed patient to move and distribute pressure when sitting for prolonged periods, and recommended getting up and walking periodically.  - Assessed wound as healing and drying up.  - Prescribed antibiotic ointment for treatment and advised patient to keep the area clean and dry.

## 2025-02-27 NOTE — PROGRESS NOTES
Health Maintenance Due   Topic     TETANUS VACCINE  Patient advised to go to pharmacy    COVID-19 Vaccine (3 - Moderna risk series) Patient declined    Diabetic Eye Exam

## 2025-02-27 NOTE — ASSESSMENT & PLAN NOTE
Anemia is likely due to chronic disease due to ESRD. Most recent hemoglobin and hematocrit are listed below.  Recent Labs     02/26/25  0000   HGB 9.2*     Confirmed pt on EPO three times a week with HD

## 2025-02-28 ENCOUNTER — HOSPITAL ENCOUNTER (INPATIENT)
Facility: HOSPITAL | Age: 50
LOS: 2 days | Discharge: HOME | DRG: 280 | End: 2025-03-04
Attending: STUDENT IN AN ORGANIZED HEALTH CARE EDUCATION/TRAINING PROGRAM | Admitting: STUDENT IN AN ORGANIZED HEALTH CARE EDUCATION/TRAINING PROGRAM
Payer: MEDICARE

## 2025-02-28 DIAGNOSIS — I25.118 CORONARY ARTERY DISEASE OF NATIVE ARTERY OF NATIVE HEART WITH STABLE ANGINA PECTORIS: ICD-10-CM

## 2025-02-28 DIAGNOSIS — R07.9 CHEST PAIN WITH HIGH RISK FOR CARDIAC ETIOLOGY: Primary | ICD-10-CM

## 2025-02-28 DIAGNOSIS — R07.9 CHEST PAIN: ICD-10-CM

## 2025-02-28 LAB
ALBUMIN SERPL BCP-MCNC: 3 G/DL (ref 3.5–5.2)
ALP SERPL-CCNC: 88 U/L (ref 40–150)
ALT SERPL W/O P-5'-P-CCNC: 9 U/L (ref 10–44)
ANION GAP SERPL CALC-SCNC: 11 MMOL/L (ref 8–16)
AST SERPL-CCNC: 17 U/L (ref 10–40)
BASOPHILS # BLD AUTO: 0.07 K/UL (ref 0–0.2)
BASOPHILS NFR BLD: 0.7 % (ref 0–1.9)
BILIRUB SERPL-MCNC: 0.5 MG/DL (ref 0.1–1)
BNP SERPL-MCNC: 1072 PG/ML (ref 0–99)
BUN SERPL-MCNC: 38 MG/DL (ref 6–20)
CALCIUM SERPL-MCNC: 8.7 MG/DL (ref 8.7–10.5)
CHLORIDE SERPL-SCNC: 104 MMOL/L (ref 95–110)
CO2 SERPL-SCNC: 21 MMOL/L (ref 23–29)
CREAT SERPL-MCNC: 6 MG/DL (ref 0.5–1.4)
DIFFERENTIAL METHOD BLD: ABNORMAL
EOSINOPHIL # BLD AUTO: 0.7 K/UL (ref 0–0.5)
EOSINOPHIL NFR BLD: 7 % (ref 0–8)
ERYTHROCYTE [DISTWIDTH] IN BLOOD BY AUTOMATED COUNT: 16.1 % (ref 11.5–14.5)
EST. GFR  (NO RACE VARIABLE): 10.9 ML/MIN/1.73 M^2
GLUCOSE SERPL-MCNC: 139 MG/DL (ref 70–110)
HCT VFR BLD AUTO: 28.9 % (ref 40–54)
HCV AB SERPL QL IA: NORMAL
HGB BLD-MCNC: 9.2 G/DL (ref 14–18)
HIV 1+2 AB+HIV1 P24 AG SERPL QL IA: NORMAL
IMM GRANULOCYTES # BLD AUTO: 0.05 K/UL (ref 0–0.04)
IMM GRANULOCYTES NFR BLD AUTO: 0.5 % (ref 0–0.5)
LYMPHOCYTES # BLD AUTO: 1.3 K/UL (ref 1–4.8)
LYMPHOCYTES NFR BLD: 13.4 % (ref 18–48)
MCH RBC QN AUTO: 30.2 PG (ref 27–31)
MCHC RBC AUTO-ENTMCNC: 31.8 G/DL (ref 32–36)
MCV RBC AUTO: 95 FL (ref 82–98)
MONOCYTES # BLD AUTO: 1 K/UL (ref 0.3–1)
MONOCYTES NFR BLD: 10.6 % (ref 4–15)
NEUTROPHILS # BLD AUTO: 6.6 K/UL (ref 1.8–7.7)
NEUTROPHILS NFR BLD: 67.8 % (ref 38–73)
NRBC BLD-RTO: 0 /100 WBC
OHS QRS DURATION: 126 MS
OHS QRS DURATION: 126 MS
OHS QTC CALCULATION: 467 MS
OHS QTC CALCULATION: 469 MS
PLATELET # BLD AUTO: 387 K/UL (ref 150–450)
PMV BLD AUTO: 9.9 FL (ref 9.2–12.9)
POTASSIUM SERPL-SCNC: 4 MMOL/L (ref 3.5–5.1)
PROT SERPL-MCNC: 7.6 G/DL (ref 6–8.4)
RBC # BLD AUTO: 3.05 M/UL (ref 4.6–6.2)
SODIUM SERPL-SCNC: 136 MMOL/L (ref 136–145)
TROPONIN I SERPL DL<=0.01 NG/ML-MCNC: 333 NG/L (ref 0–35)
TROPONIN I SERPL DL<=0.01 NG/ML-MCNC: 339 NG/L (ref 0–35)
TROPONIN I SERPL DL<=0.01 NG/ML-MCNC: 349 NG/L (ref 0–35)
WBC # BLD AUTO: 9.73 K/UL (ref 3.9–12.7)

## 2025-02-28 PROCEDURE — 93010 ELECTROCARDIOGRAM REPORT: CPT | Mod: HCNC,,, | Performed by: INTERNAL MEDICINE

## 2025-02-28 PROCEDURE — G0378 HOSPITAL OBSERVATION PER HR: HCPCS | Mod: HCNC

## 2025-02-28 PROCEDURE — 93005 ELECTROCARDIOGRAM TRACING: CPT | Mod: HCNC

## 2025-02-28 PROCEDURE — 83880 ASSAY OF NATRIURETIC PEPTIDE: CPT | Mod: HCNC | Performed by: EMERGENCY MEDICINE

## 2025-02-28 PROCEDURE — 25000003 PHARM REV CODE 250: Mod: HCNC | Performed by: NURSE PRACTITIONER

## 2025-02-28 PROCEDURE — 84484 ASSAY OF TROPONIN QUANT: CPT | Mod: HCNC | Performed by: STUDENT IN AN ORGANIZED HEALTH CARE EDUCATION/TRAINING PROGRAM

## 2025-02-28 PROCEDURE — 85025 COMPLETE CBC W/AUTO DIFF WBC: CPT | Mod: HCNC | Performed by: EMERGENCY MEDICINE

## 2025-02-28 PROCEDURE — 86803 HEPATITIS C AB TEST: CPT | Mod: HCNC | Performed by: PHYSICIAN ASSISTANT

## 2025-02-28 PROCEDURE — 25000242 PHARM REV CODE 250 ALT 637 W/ HCPCS: Mod: HCNC

## 2025-02-28 PROCEDURE — 99285 EMERGENCY DEPT VISIT HI MDM: CPT | Mod: 25,HCNC

## 2025-02-28 PROCEDURE — 80053 COMPREHEN METABOLIC PANEL: CPT | Mod: HCNC | Performed by: EMERGENCY MEDICINE

## 2025-02-28 PROCEDURE — 63600175 PHARM REV CODE 636 W HCPCS: Mod: HCNC | Performed by: NURSE PRACTITIONER

## 2025-02-28 PROCEDURE — 96372 THER/PROPH/DIAG INJ SC/IM: CPT | Performed by: NURSE PRACTITIONER

## 2025-02-28 PROCEDURE — 87389 HIV-1 AG W/HIV-1&-2 AB AG IA: CPT | Mod: HCNC | Performed by: PHYSICIAN ASSISTANT

## 2025-02-28 PROCEDURE — 84484 ASSAY OF TROPONIN QUANT: CPT | Mod: 91,HCNC | Performed by: EMERGENCY MEDICINE

## 2025-02-28 RX ORDER — ISOSORBIDE MONONITRATE 60 MG/1
120 TABLET, EXTENDED RELEASE ORAL DAILY
Status: DISCONTINUED | OUTPATIENT
Start: 2025-02-28 | End: 2025-02-28

## 2025-02-28 RX ORDER — ONDANSETRON HYDROCHLORIDE 2 MG/ML
4 INJECTION, SOLUTION INTRAVENOUS EVERY 8 HOURS PRN
Status: DISCONTINUED | OUTPATIENT
Start: 2025-02-28 | End: 2025-03-04 | Stop reason: HOSPADM

## 2025-02-28 RX ORDER — ASPIRIN 81 MG/1
81 TABLET ORAL DAILY
Status: DISCONTINUED | OUTPATIENT
Start: 2025-03-01 | End: 2025-03-04 | Stop reason: HOSPADM

## 2025-02-28 RX ORDER — ACETAMINOPHEN 500 MG
1000 TABLET ORAL ONCE
Status: COMPLETED | OUTPATIENT
Start: 2025-03-01 | End: 2025-02-28

## 2025-02-28 RX ORDER — RANOLAZINE 500 MG/1
500 TABLET, EXTENDED RELEASE ORAL DAILY
Status: DISCONTINUED | OUTPATIENT
Start: 2025-03-01 | End: 2025-03-04 | Stop reason: HOSPADM

## 2025-02-28 RX ORDER — EZETIMIBE 10 MG/1
10 TABLET ORAL DAILY
Status: DISCONTINUED | OUTPATIENT
Start: 2025-03-01 | End: 2025-03-04 | Stop reason: HOSPADM

## 2025-02-28 RX ORDER — TALC
6 POWDER (GRAM) TOPICAL NIGHTLY PRN
Status: DISCONTINUED | OUTPATIENT
Start: 2025-02-28 | End: 2025-03-04 | Stop reason: HOSPADM

## 2025-02-28 RX ORDER — NITROGLYCERIN 0.4 MG/1
0.4 TABLET SUBLINGUAL EVERY 5 MIN PRN
Status: COMPLETED | OUTPATIENT
Start: 2025-02-28 | End: 2025-03-01

## 2025-02-28 RX ORDER — ISOSORBIDE MONONITRATE 60 MG/1
120 TABLET, EXTENDED RELEASE ORAL DAILY
Status: DISCONTINUED | OUTPATIENT
Start: 2025-03-01 | End: 2025-03-04 | Stop reason: HOSPADM

## 2025-02-28 RX ORDER — ACETAMINOPHEN 325 MG/1
650 TABLET ORAL EVERY 4 HOURS PRN
Status: DISCONTINUED | OUTPATIENT
Start: 2025-02-28 | End: 2025-03-04 | Stop reason: HOSPADM

## 2025-02-28 RX ORDER — ISOSORBIDE MONONITRATE 60 MG/1
120 TABLET, EXTENDED RELEASE ORAL DAILY
Status: DISCONTINUED | OUTPATIENT
Start: 2025-03-01 | End: 2025-02-28

## 2025-02-28 RX ORDER — INSULIN ASPART 100 [IU]/ML
0-5 INJECTION, SOLUTION INTRAVENOUS; SUBCUTANEOUS
Status: DISCONTINUED | OUTPATIENT
Start: 2025-02-28 | End: 2025-02-28

## 2025-02-28 RX ORDER — HEPARIN SODIUM 5000 [USP'U]/ML
5000 INJECTION, SOLUTION INTRAVENOUS; SUBCUTANEOUS EVERY 8 HOURS
Status: DISCONTINUED | OUTPATIENT
Start: 2025-02-28 | End: 2025-03-02

## 2025-02-28 RX ORDER — ISOSORBIDE DINITRATE 5 MG/1
20 TABLET ORAL ONCE
Status: COMPLETED | OUTPATIENT
Start: 2025-02-28 | End: 2025-02-28

## 2025-02-28 RX ORDER — MINOXIDIL 2.5 MG/1
10 TABLET ORAL 2 TIMES DAILY
Status: DISCONTINUED | OUTPATIENT
Start: 2025-03-01 | End: 2025-03-04 | Stop reason: HOSPADM

## 2025-02-28 RX ORDER — GLUCAGON 1 MG
1 KIT INJECTION
Status: DISCONTINUED | OUTPATIENT
Start: 2025-02-28 | End: 2025-03-04 | Stop reason: HOSPADM

## 2025-02-28 RX ORDER — SODIUM CHLORIDE 0.9 % (FLUSH) 0.9 %
10 SYRINGE (ML) INJECTION EVERY 12 HOURS PRN
Status: DISCONTINUED | OUTPATIENT
Start: 2025-02-28 | End: 2025-03-04 | Stop reason: HOSPADM

## 2025-02-28 RX ORDER — CLOPIDOGREL BISULFATE 75 MG/1
75 TABLET ORAL DAILY
Status: DISCONTINUED | OUTPATIENT
Start: 2025-03-01 | End: 2025-03-04 | Stop reason: HOSPADM

## 2025-02-28 RX ORDER — IBUPROFEN 200 MG
24 TABLET ORAL
Status: DISCONTINUED | OUTPATIENT
Start: 2025-02-28 | End: 2025-03-04 | Stop reason: HOSPADM

## 2025-02-28 RX ORDER — GABAPENTIN 100 MG/1
100 CAPSULE ORAL ONCE
Status: COMPLETED | OUTPATIENT
Start: 2025-03-01 | End: 2025-02-28

## 2025-02-28 RX ORDER — HYDRALAZINE HYDROCHLORIDE 25 MG/1
25 TABLET, FILM COATED ORAL EVERY 8 HOURS
Status: DISCONTINUED | OUTPATIENT
Start: 2025-02-28 | End: 2025-03-04

## 2025-02-28 RX ORDER — SEVELAMER CARBONATE 800 MG/1
1600 TABLET, FILM COATED ORAL
Status: DISCONTINUED | OUTPATIENT
Start: 2025-03-01 | End: 2025-03-04 | Stop reason: HOSPADM

## 2025-02-28 RX ORDER — NALOXONE HCL 0.4 MG/ML
0.02 VIAL (ML) INJECTION
Status: DISCONTINUED | OUTPATIENT
Start: 2025-02-28 | End: 2025-03-04 | Stop reason: HOSPADM

## 2025-02-28 RX ORDER — IBUPROFEN 200 MG
16 TABLET ORAL
Status: DISCONTINUED | OUTPATIENT
Start: 2025-02-28 | End: 2025-03-04 | Stop reason: HOSPADM

## 2025-02-28 RX ORDER — ATORVASTATIN CALCIUM 40 MG/1
80 TABLET, FILM COATED ORAL NIGHTLY
Status: DISCONTINUED | OUTPATIENT
Start: 2025-02-28 | End: 2025-03-04 | Stop reason: HOSPADM

## 2025-02-28 RX ORDER — CARVEDILOL 25 MG/1
25 TABLET ORAL 2 TIMES DAILY WITH MEALS
Status: DISCONTINUED | OUTPATIENT
Start: 2025-03-01 | End: 2025-03-04 | Stop reason: HOSPADM

## 2025-02-28 RX ADMIN — HEPARIN SODIUM 5000 UNITS: 5000 INJECTION INTRAVENOUS; SUBCUTANEOUS at 10:02

## 2025-02-28 RX ADMIN — ATORVASTATIN CALCIUM 80 MG: 40 TABLET, FILM COATED ORAL at 08:02

## 2025-02-28 RX ADMIN — ISOSORBIDE DINITRATE 20 MG: 5 TABLET ORAL at 08:02

## 2025-02-28 RX ADMIN — ACETAMINOPHEN 1000 MG: 500 TABLET ORAL at 11:02

## 2025-02-28 RX ADMIN — Medication 6 MG: at 11:02

## 2025-02-28 RX ADMIN — GABAPENTIN 100 MG: 100 CAPSULE ORAL at 11:02

## 2025-02-28 RX ADMIN — NITROGLYCERIN 0.4 MG: 0.4 TABLET, ORALLY DISINTEGRATING SUBLINGUAL at 02:02

## 2025-02-28 NOTE — ED TRIAGE NOTES
Haroldo Dickinson, a 49 y.o. male presents to the ED w/ complaint of chest pain     Patient c/o midsternal chest pain that radiates down left arm that started at dialysis. Received nitro and aspirin with EMS. Denies SOB, Denies nausea/vomiting     Triage note:  Chief Complaint   Patient presents with    Chest Pain     Chest pain for multiple days, was at dialysis received approx [id number] of tx, received 325mg asa and 1 sl nitro with EMS     Review of patient's allergies indicates:   Allergen Reactions    No known allergies      Past Medical History:   Diagnosis Date    CAD (coronary artery disease), native coronary artery 11/21/2019    Cataract     Diabetes mellitus     Diabetic retinopathy     Dialysis patient     DM type 2 causing renal disease, not at goal     ESRD (end stage renal disease) started dialysis 01/2014 06/05/2014    H/o acute coronary syndrome with high troponin 03/11/2024    History of colon polyps 02/10/2021    History of COVID-19 12/29/2022    Hyperparathyroidism, secondary renal 06/05/2014    Hypertension     Hypertensive emergency 11/27/2023    NSTEMI (non-ST elevated myocardial infarction) 12/21/2013    Organ transplant candidate 06/05/2014    Palliative care encounter 10/29/2024    Pleural effusion 06/07/2024    Pneumonia     Post PTCA 08/26/2020    RCA [facility] 7-25-20    Renal cell carcinoma of right kidney     Renal hypertension     Stroke

## 2025-02-28 NOTE — ED PROVIDER NOTES
Encounter Date: 2/28/2025       History     Chief Complaint   Patient presents with    Chest Pain     Chest pain for multiple days, was at dialysis received approx [id number] of tx, received 325mg asa and 1 sl nitro with EMS     HPI    Haroldo Dickinson is a 49 y.o. male w/ a PMHx significant for ACS, CAD, NSTEMI s/p PTCA on Plavix, ESRD on dialysis, HTN, renal cell carcinoma, CHF. Patient presents to the ED today for all education sharp left-sided CP radiating to his left arm w/no additional associated symptoms.  This pain started at about 10:00 during his dialysis session, he was about 0.5-2 hours into dialysis when his pain started.  Initially, pain was rated as 8/10.  Patient was transported via EMS and received  mg and 1 sublingual nitroglycerin, which provided near resolution in his pain., since that time the pain has started to increase again, and he now rates it about a 6/10.  Of note, patient has semi-frequent episodes of similar CP after dialysis sessions and typically takes SL nitro 1-3x per week. Patient was recently admitted for similar symptoms (discharged 2/11). Patient denies missed medications, missed dialysis sessions, lightheadedness, syncope, HA, SOB, cough/congestion, abdominal pain, N/V/D, lower extremity edema/erythema.    Please see MDM for additional details.    Review of patient's allergies indicates:   Allergen Reactions    No known allergies      Past Medical History:   Diagnosis Date    CAD (coronary artery disease), native coronary artery 11/21/2019    Cataract     Diabetes mellitus     Diabetic retinopathy     Dialysis patient     DM type 2 causing renal disease, not at goal     ESRD (end stage renal disease) started dialysis 01/2014 06/05/2014    H/o acute coronary syndrome with high troponin 03/11/2024    History of colon polyps 02/10/2021    History of COVID-19 12/29/2022    Hyperparathyroidism, secondary renal 06/05/2014    Hypertension     Hypertensive emergency 11/27/2023     NSTEMI (non-ST elevated myocardial infarction) 12/21/2013    Organ transplant candidate 06/05/2014    Palliative care encounter 10/29/2024    Pleural effusion 06/07/2024    Pneumonia     Post PTCA 08/26/2020    RCA HILLARY 7-25-20    Renal cell carcinoma of right kidney     Renal hypertension     Stroke      Past Surgical History:   Procedure Laterality Date    ANGIOGRAM, CORONARY, WITH LEFT HEART CATHETERIZATION N/A 7/1/2021    Procedure: Angiogram, Coronary, with Left Heart Cath;  Surgeon: Miki Zendejas MD;  Location: [facility] CATH LAB;  Service: Cardiology;  Laterality: N/A;    ANGIOGRAM, CORONARY, WITH LEFT HEART CATHETERIZATION N/A 11/28/2023    Procedure: Angiogram, Coronary, with Left Heart Cath;  Surgeon: Noah Pendleton MD;  Location: [facility] CATH LAB;  Service: Cardiology;  Laterality: N/A;    COLONOSCOPY N/A 5/3/2017    Procedure: COLONOSCOPY;  Surgeon: Rufino Carpenter MD;  [facility]  Service: Endoscopy;  Laterality: N/A;  pt states can only schedule on Wednesdays    COLONOSCOPY N/A 2/9/2021    Procedure: COLONOSCOPY;  Surgeon: Edil Wong MD;  [facility]  Service: Endoscopy;  Laterality: N/A;  Dialysis MWF/ labwork day of procedure  right arm aceess  per Dr. Colin pt can hold Plavix 5 days prior see note- sm  COVID test on 2/6/21 at [facility] -     COLONOSCOPY N/A 2/10/2021    Procedure: COLONOSCOPY;  Surgeon: Robert Ayers MD;  [facility]  Service: Endoscopy;  Laterality: N/A;  rescheduled due to poor bowel prep-BB  negative covid screening 2/6/21-BB  dialysis M-W-F-BB  okay to r/s for 2/9/21 and to hold Plavix per Dr. KIRAN Wong-BB  labs same day-BB    CORONARY ANGIOGRAPHY N/A 2/10/2025    Procedure: ANGIOGRAM, CORONARY ARTERY;  Surgeon: Ajith Hutchins MD;  Location: [facility] CATH LAB;  Service: Cardiology;  Laterality: N/A;    CORONARY STENT PLACEMENT N/A 7/25/2019    Procedure: INSERTION, STENT, CORONARY ARTERY;  Surgeon: Miki Zendejas,  MD;  Location: [facility] CATH LAB;  Service: Cardiology;  Laterality: N/A;    DIALYSIS FISTULA CREATION      FISTULOGRAM N/A 2/18/2019    Procedure: Fistulogram;  Surgeon: Yaneth De La Cruz MD;  Location: [facility] CATH LAB;  Service: Cardiology;  Laterality: N/A;    FISTULOGRAM Right 7/23/2019    Procedure: Fistulogram;  Surgeon: Oz Cordoba MD;  Location: [facility] CATH LAB;  Service: Cardiology;  Laterality: Right;    LAPAROSCOPIC ROBOT-ASSISTED SURGICAL REMOVAL OF KIDNEY USING DA JAIME XI Right 5/19/2022    Procedure: XI ROBOTIC NEPHRECTOMY;  Surgeon: Aidan Hendricks MD;  [address]  Service: Urology;  Laterality: Right;  3hrs    LAPAROSCOPIC ROBOT-ASSISTED SURGICAL REMOVAL OF KIDNEY USING DA JAIME XI Left 1/5/2023    Procedure: XI ROBOTIC NEPHRECTOMY;  Surgeon: Aidan Hendricks MD;  [address]  Service: Urology;  Laterality: Left;  4 hrs    LEFT HEART CATHETERIZATION Left 7/25/2019    Procedure: Left heart cath;  Surgeon: Miki Zendejas MD;  Location: [facility] CATH LAB;  Service: Cardiology;  Laterality: Left;    LEFT HEART CATHETERIZATION Left 2/10/2025    Procedure: Left heart cath;  Surgeon: Ajith Hutchins MD;  Location: [facility] CATH LAB;  Service: Cardiology;  Laterality: Left;    REPAIR  1/5/2023    Procedure: REPAIR; COLOTOMY;  Surgeon: Maikel Lamb MD;  [address];  Service: General;;    RETINAL LASER PROCEDURE Bilateral 2018 or 2017    Dr. Rothman    VASCULAR SURGERY       Family History   Problem Relation Name Age of Onset    Hypertension Mother      Diabetes Mother      Cataracts Mother      Hypertension Father      Hypertension Sister      Kidney disease Brother      Hypertension Brother      Heart disease Brother      Cancer Maternal Grandfather          Colon CA    Colon cancer Neg Hx      Esophageal cancer Neg Hx      Stomach cancer Neg Hx      Rectal cancer Neg Hx      Ulcerative colitis Neg Hx      Irritable bowel syndrome Neg Hx      Crohn's  disease Neg Hx      Celiac disease Neg Hx      Glaucoma Neg Hx      Macular degeneration Neg Hx       Social History[1]  Review of Systems    Physical Exam     Initial Vitals [02/28/25 1145]   BP Pulse Resp Temp SpO2   111/73 97 18 98.1 °F (36.7 °C) 97 %      MAP       --         Physical Exam    Nursing note and vitals reviewed.  Constitutional: He appears well-developed and well-nourished. He is not diaphoretic. No distress.   HENT:   Head: Normocephalic and atraumatic.   Nose: Nose normal.   Eyes: Conjunctivae and EOM are normal. No scleral icterus.   Neck: No JVD present.   Normal range of motion.  Cardiovascular:  Normal rate, normal heart sounds and intact distal pulses.           Pulmonary/Chest: Breath sounds normal. No stridor. No respiratory distress. He exhibits no tenderness.   Abdominal: Abdomen is soft. He exhibits no distension. There is no abdominal tenderness.   Musculoskeletal:         General: No tenderness or edema. Normal range of motion.      Cervical back: Normal range of motion.     Neurological: He is alert and oriented to person, place, and time. He has normal strength. No sensory deficit.   Skin: Skin is warm and dry. Capillary refill takes less than 2 seconds.   Psychiatric: He has a normal mood and affect. His behavior is normal.         ED Course   Procedures  Labs Reviewed   CBC W/ AUTO DIFFERENTIAL - Abnormal       Result Value    WBC 9.73      RBC 3.05 (*)     Hemoglobin 9.2 (*)     Hematocrit 28.9 (*)     MCV 95      MCH 30.2      MCHC 31.8 (*)     RDW 16.1 (*)     Platelets 387      MPV 9.9      Immature Granulocytes 0.5      Gran # (ANC) 6.6      Immature Grans (Abs) 0.05 (*)     Lymph # 1.3      Mono # 1.0      Eos # 0.7 (*)     Baso # 0.07      nRBC 0      Gran % 67.8      Lymph % 13.4 (*)     Mono % 10.6      Eosinophil % 7.0      Basophil % 0.7      Differential Method Automated      Narrative:     Release to patient->Immediate   COMPREHENSIVE METABOLIC PANEL - Abnormal     Sodium 136      Potassium 4.0      Chloride 104      CO2 21 (*)     Glucose 139 (*)     BUN 38 (*)     Creatinine 6.0 (*)     Calcium 8.7      Total Protein 7.6      Albumin 3.0 (*)     Total Bilirubin 0.5      Alkaline Phosphatase 88      AST 17      ALT 9 (*)     eGFR 10.9 (*)     Anion Gap 11      Narrative:     Release to patient->Immediate   TROPONIN I HIGH SENSITIVITY - Abnormal    Troponin I High Sensitivity 349 (*)     Narrative:     Release to patient->Immediate   TROPONIN I HIGH SENSITIVITY - Abnormal    Troponin I High Sensitivity 333 (*)    B-TYPE NATRIURETIC PEPTIDE - Abnormal    BNP 1,072 (*)     Narrative:     Release to patient->Immediate   TROPONIN I HIGH SENSITIVITY - Abnormal    Troponin I High Sensitivity 339 (*)    HEPATITIS C ANTIBODY    Hepatitis C Ab Non-reactive      Narrative:     Release to patient->Immediate   HIV 1 / 2 ANTIBODY    HIV 1/2 Ag/Ab Non-reactive      Narrative:     Release to patient->Immediate        ECG Results              EKG 12-lead (Final result)        Collection Time Result Time QRS Duration OHS QTC Calculation    02/28/25 14:12:32 02/28/25 14:16:10 126 467                     Final result by Interface, Lab In [facility] (02/28/25 14:16:15)                   Narrative:    Test Reason : R07.9,    Vent. Rate :  92 BPM     Atrial Rate :  92 BPM     P-R Int : 194 ms          QRS Dur : 126 ms      QT Int : 378 ms       P-R-T Axes :  77  80  18 degrees    QTcB Int : 467 ms    Normal sinus rhythm  Nonspecific intraventricular block  Abnormal ECG  When compared with ECG of 28-Feb-2025 11:50,  No significant change was found  Confirmed by González Griggs ([id number]) on 2/28/2025 2:16:08 PM    Referred By: AAAREFERRAL SELF           Confirmed By: González Griggs                                     EKG 12-lead (Final result)        Collection Time Result Time QRS Duration OHS QTC Calculation    02/28/25 11:50:31 02/28/25 11:53:29 126 469                     Final result by Interface, Lab  In [facility] (02/28/25 11:53:32)                   Narrative:    Test Reason : R07.9,    Vent. Rate :  89 BPM     Atrial Rate :  89 BPM     P-R Int : 192 ms          QRS Dur : 126 ms      QT Int : 386 ms       P-R-T Axes :  79  87  55 degrees    QTcB Int : 469 ms    Normal sinus rhythm  LVH with QRS widening and repolarization abnormality ( [place] product )  Abnormal ECG  When compared with ECG of 09-Feb-2025 15:23,  No significant change was found  Confirmed by Aidan Villa ([id number]) on 2/28/2025 11:53:26 AM    Referred By:            Confirmed By: Aidan Villa                                  Imaging Results              X-Ray Chest AP Portable (Final result)  Result time 02/28/25 13:45:25      Final result by Wilberto Cornejo MD (02/28/25 13:45:25)                   Impression:      Cardiomegaly and other findings likely representing CHF with perihilar edema.      Electronically signed by: Wilberto Cornejo MD  Date:    02/28/2025  Time:    13:45               Narrative:    EXAMINATION:  XR CHEST AP PORTABLE    CLINICAL HISTORY:  Chest pain, unspecified    TECHNIQUE:  Single frontal view of the chest was performed.    COMPARISON:  02/09/2025    FINDINGS:  The heart size is mildly enlarged.  Mediastinal structures are midline with note of aortic atherosclerosis.  Lungs are expanded with mildly increased pulmonary vascularity.  Faint ground-glass opacity is present in the central regions of both lungs with bilateral pattern likely indicating edema.  Multifocal pneumonia is also possible.  No significant pleural fluid is seen.  Skeletal structures are intact.  Monitor wires overlie the chest.                                       Medications   nitroGLYCERIN SL tablet 0.4 mg (0.4 mg Sublingual Given 2/28/25 1416)     Medical Decision Making  Haroldo Dickinson is a 49 y.o. male who presents to the emergency department for CP as detailed in HPI. On initial evaluation, patient in NAD and VSS. EKG shows NSR w/ evidence of  LVH; no acute ischemic changes; stable compared to previous EKGs. On exam, systolic murmur appreciated. Lungs clear to auscultation. No LE edema/erythema/tenderness. Otherwise, exam largely unremarkable. Sublingual nitro ordered for CP. On re-eval, patient states that he has had a few additional episodes of CP similar in character to the previous episodes.  However, he did not request nitro during these episodes.  These lasted about 20 minutes each, w/ about a 20-30 minute gap in between episodes.  These intermittent episodes persisted throughout his ED course. On re-evaluation, patient has no significant change in physical exam, VS, or overall clinical status.     Pertinent Labs:  CBC w/ anemia, but consistent w/ baseline.  No leukocytosis, reducing concern for acute infectious process.  CMP w/ significant impairments in renal function, but consistent w/baseline.  Significant uremia.  BNP elevated from baseline at 1072, consistent w/acute volume overload and consistent w/CXR; I do have suspicion that this is contributing to patient's recurrent episodes of CP.  Troponin elevated into the 300s, which is improved from previous labs; to additional troponins ordered which are stable in the 300s, reducing concern for ACS.    Pertinent Imaging: CXR w/evidence of pulmonary edema w/o significant pleural effusion, consolidation, or other acute findings.     Ddx including, but not limited to:  ACS v. metabolic derangement v. dysrhythmia v. Hypervolemia // CHF v. Anemia v. Infection      Most likely Dx:  At this time, I have high suspicion for patient's symptoms being 2/2 likely hypovolemia/CHF causing increased cardiac demand.  Lower suspicion for acute cardiac ischemia.      Disposition:   Patient will be admitted to . Discussed reason for admission and plan with patient and/or caregiver who expressed understanding and agreement.    Please see HPI, physical exam, ED course for additional details.    Amount and/or  Complexity of Data Reviewed  External Data Reviewed: radiology.     Details: === Results for orders placed during the hospital encounter of 02/09/25 ===    Echo    - Interpretation Summary -  ·  Left Ventricle: The left ventricle is mildly dilated. Mildly increased wall thickness. There is mild concentric hypertrophy. There is low normal systolic function with a visually estimated ejection fraction of 50 - 55%. Grade II diastolic dysfunction.  ·  Right Ventricle: Normal right ventricular cavity size. Systolic function is normal.  ·  Left Atrium: Left atrium is mildly dilated.  ·  Aortic Valve: The aortic valve is a trileaflet valve. There is moderate aortic valve sclerosis.  ·  Mitral Valve: There is mild to moderate regurgitation.  ·  Tricuspid Valve: There is mild regurgitation.      Labs:  Decision-making details documented in ED Course.  Radiology: ordered.  ECG/medicine tests:  Decision-making details documented in ED Course.    Risk  Prescription drug management.              Attending Attestation:   Physician Attestation Statement for Resident:  As the supervising MD   Physician Attestation Statement: I have personally seen and examined this patient.   I agree with the above history.  -:   As the supervising MD I agree with the above PE.     As the supervising MD I agree with the above treatment, course, plan, and disposition.   -: See ED course for additional attending MDM                     ED Course as of 02/28/25 1858   Fri Feb 28, 2025   1308 EKG 12-lead  Independently interpreted shows normal sinus rhythm, rate rate 89, no STEMI, LVH, appears stable compared to 02/09/2025 [BD]   1309 CBC auto differential(!)  baseline [BD]   1310 Comprehensive metabolic panel(!)  baseline [BD]   1514 Pt's pain decreased from 9 to 0 after one sublingual NTG [BD]      ED Course User Index  [BD] Christian Gerard MD                           Clinical Impression:  Final diagnoses:  [R07.9] Chest pain  [R07.9] Chest pain  with high risk for cardiac etiology (Primary)          ED Disposition Condition    Observation                     [1]   Social History  Tobacco Use    Smoking status: Never    Smokeless tobacco: Never   Substance Use Topics    Alcohol use: Not Currently     Comment: One drink a month    Drug use: Raoul Roberts MD  Resident  02/28/25 2315

## 2025-03-01 PROBLEM — R07.9 CHEST PAIN: Status: ACTIVE | Noted: 2025-03-01

## 2025-03-01 LAB
ALBUMIN SERPL BCP-MCNC: 3.1 G/DL (ref 3.5–5.2)
ALP SERPL-CCNC: 91 U/L (ref 40–150)
ALT SERPL W/O P-5'-P-CCNC: 8 U/L (ref 10–44)
ANION GAP SERPL CALC-SCNC: 13 MMOL/L (ref 8–16)
AST SERPL-CCNC: 16 U/L (ref 10–40)
BASOPHILS # BLD AUTO: 0.19 K/UL (ref 0–0.2)
BASOPHILS NFR BLD: 1.5 % (ref 0–1.9)
BILIRUB SERPL-MCNC: 0.6 MG/DL (ref 0.1–1)
BUN SERPL-MCNC: 49 MG/DL (ref 6–20)
CALCIUM SERPL-MCNC: 9.3 MG/DL (ref 8.7–10.5)
CHLORIDE SERPL-SCNC: 103 MMOL/L (ref 95–110)
CO2 SERPL-SCNC: 20 MMOL/L (ref 23–29)
CREAT SERPL-MCNC: 8.3 MG/DL (ref 0.5–1.4)
DIFFERENTIAL METHOD BLD: ABNORMAL
EOSINOPHIL # BLD AUTO: 0.9 K/UL (ref 0–0.5)
EOSINOPHIL NFR BLD: 7.2 % (ref 0–8)
ERYTHROCYTE [DISTWIDTH] IN BLOOD BY AUTOMATED COUNT: 16.1 % (ref 11.5–14.5)
EST. GFR  (NO RACE VARIABLE): 7.4 ML/MIN/1.73 M^2
GLUCOSE SERPL-MCNC: 133 MG/DL (ref 70–110)
HCT VFR BLD AUTO: 30.1 % (ref 40–54)
HGB BLD-MCNC: 9.5 G/DL (ref 14–18)
IMM GRANULOCYTES # BLD AUTO: 0.06 K/UL (ref 0–0.04)
IMM GRANULOCYTES NFR BLD AUTO: 0.5 % (ref 0–0.5)
LYMPHOCYTES # BLD AUTO: 1.2 K/UL (ref 1–4.8)
LYMPHOCYTES NFR BLD: 9.4 % (ref 18–48)
MAGNESIUM SERPL-MCNC: 2 MG/DL (ref 1.6–2.6)
MCH RBC QN AUTO: 30.1 PG (ref 27–31)
MCHC RBC AUTO-ENTMCNC: 31.6 G/DL (ref 32–36)
MCV RBC AUTO: 95 FL (ref 82–98)
MONOCYTES # BLD AUTO: 1.4 K/UL (ref 0.3–1)
MONOCYTES NFR BLD: 11.2 % (ref 4–15)
NEUTROPHILS # BLD AUTO: 9 K/UL (ref 1.8–7.7)
NEUTROPHILS NFR BLD: 70.2 % (ref 38–73)
NRBC BLD-RTO: 0 /100 WBC
PHOSPHATE SERPL-MCNC: 5.1 MG/DL (ref 2.7–4.5)
PLATELET # BLD AUTO: 435 K/UL (ref 150–450)
PMV BLD AUTO: 10.1 FL (ref 9.2–12.9)
POTASSIUM SERPL-SCNC: 4.2 MMOL/L (ref 3.5–5.1)
PROT SERPL-MCNC: 7.8 G/DL (ref 6–8.4)
RBC # BLD AUTO: 3.16 M/UL (ref 4.6–6.2)
SODIUM SERPL-SCNC: 136 MMOL/L (ref 136–145)
WBC # BLD AUTO: 12.83 K/UL (ref 3.9–12.7)

## 2025-03-01 PROCEDURE — 83735 ASSAY OF MAGNESIUM: CPT | Mod: HCNC | Performed by: NURSE PRACTITIONER

## 2025-03-01 PROCEDURE — 63600175 PHARM REV CODE 636 W HCPCS: Mod: HCNC | Performed by: NURSE PRACTITIONER

## 2025-03-01 PROCEDURE — 96372 THER/PROPH/DIAG INJ SC/IM: CPT | Performed by: NURSE PRACTITIONER

## 2025-03-01 PROCEDURE — 25000003 PHARM REV CODE 250: Mod: HCNC | Performed by: NURSE PRACTITIONER

## 2025-03-01 PROCEDURE — 84100 ASSAY OF PHOSPHORUS: CPT | Mod: HCNC | Performed by: NURSE PRACTITIONER

## 2025-03-01 PROCEDURE — 99214 OFFICE O/P EST MOD 30 MIN: CPT | Mod: HCNC,,, | Performed by: NURSE PRACTITIONER

## 2025-03-01 PROCEDURE — G0378 HOSPITAL OBSERVATION PER HR: HCPCS | Mod: HCNC

## 2025-03-01 PROCEDURE — 25000242 PHARM REV CODE 250 ALT 637 W/ HCPCS: Mod: HCNC

## 2025-03-01 PROCEDURE — 36415 COLL VENOUS BLD VENIPUNCTURE: CPT | Mod: HCNC | Performed by: NURSE PRACTITIONER

## 2025-03-01 PROCEDURE — 85025 COMPLETE CBC W/AUTO DIFF WBC: CPT | Mod: HCNC | Performed by: NURSE PRACTITIONER

## 2025-03-01 PROCEDURE — 80053 COMPREHEN METABOLIC PANEL: CPT | Mod: HCNC | Performed by: NURSE PRACTITIONER

## 2025-03-01 RX ORDER — NITROGLYCERIN 0.4 MG/1
0.4 TABLET SUBLINGUAL EVERY 5 MIN PRN
Status: DISCONTINUED | OUTPATIENT
Start: 2025-03-01 | End: 2025-03-04 | Stop reason: HOSPADM

## 2025-03-01 RX ADMIN — HYDRALAZINE HYDROCHLORIDE 25 MG: 25 TABLET ORAL at 10:03

## 2025-03-01 RX ADMIN — ACETAMINOPHEN 650 MG: 325 TABLET ORAL at 07:03

## 2025-03-01 RX ADMIN — HEPARIN SODIUM 5000 UNITS: 5000 INJECTION INTRAVENOUS; SUBCUTANEOUS at 10:03

## 2025-03-01 RX ADMIN — NIFEDIPINE 90 MG: 60 TABLET, FILM COATED, EXTENDED RELEASE ORAL at 08:03

## 2025-03-01 RX ADMIN — ACETAMINOPHEN 650 MG: 325 TABLET ORAL at 11:03

## 2025-03-01 RX ADMIN — SEVELAMER CARBONATE 1600 MG: 800 TABLET, FILM COATED ORAL at 12:03

## 2025-03-01 RX ADMIN — HYDRALAZINE HYDROCHLORIDE 25 MG: 25 TABLET ORAL at 05:03

## 2025-03-01 RX ADMIN — EZETIMIBE 10 MG: 10 TABLET ORAL at 08:03

## 2025-03-01 RX ADMIN — HEPARIN SODIUM 5000 UNITS: 5000 INJECTION INTRAVENOUS; SUBCUTANEOUS at 02:03

## 2025-03-01 RX ADMIN — MINOXIDIL 10 MG: 2.5 TABLET ORAL at 10:03

## 2025-03-01 RX ADMIN — CLOPIDOGREL BISULFATE 75 MG: 75 TABLET ORAL at 08:03

## 2025-03-01 RX ADMIN — ASPIRIN 81 MG: 81 TABLET, COATED ORAL at 08:03

## 2025-03-01 RX ADMIN — NITROGLYCERIN 0.4 MG: 0.4 TABLET, ORALLY DISINTEGRATING SUBLINGUAL at 06:03

## 2025-03-01 RX ADMIN — RANOLAZINE 500 MG: 500 TABLET, EXTENDED RELEASE ORAL at 04:03

## 2025-03-01 RX ADMIN — ISOSORBIDE MONONITRATE 120 MG: 60 TABLET, EXTENDED RELEASE ORAL at 08:03

## 2025-03-01 RX ADMIN — SEVELAMER CARBONATE 1600 MG: 800 TABLET, FILM COATED ORAL at 07:03

## 2025-03-01 RX ADMIN — SEVELAMER CARBONATE 1600 MG: 800 TABLET, FILM COATED ORAL at 04:03

## 2025-03-01 RX ADMIN — HEPARIN SODIUM 5000 UNITS: 5000 INJECTION INTRAVENOUS; SUBCUTANEOUS at 05:03

## 2025-03-01 RX ADMIN — ATORVASTATIN CALCIUM 80 MG: 40 TABLET, FILM COATED ORAL at 10:03

## 2025-03-01 RX ADMIN — CARVEDILOL 25 MG: 25 TABLET, FILM COATED ORAL at 08:03

## 2025-03-01 NOTE — ASSESSMENT & PLAN NOTE
Patient's blood pressure range in the last 24 hours was: BP  Min: 101/62  Max: 130/73.The patient's inpatient anti-hypertensive regimen is listed below:  Current Antihypertensives  carvediloL tablet 25 mg, 2 times daily with meals, Oral  hydrALAZINE tablet 25 mg, Every 8 hours, Oral  NIFEdipine 24 hr tablet 90 mg, Daily, Oral  ranolazine 12 hr tablet 500 mg, Daily, Oral  minoxidiL tablet 10 mg, 2 times daily, Oral  isosorbide mononitrate 24 hr tablet 120 mg, Daily, Oral    Plan  - BP is controlled, no changes needed to their regimen

## 2025-03-01 NOTE — ASSESSMENT & PLAN NOTE
- Acute, patient reports chest pain is resolved. Described as left lower chest wall pain that is sharp in nature. No associated SOB, N/V, or diaphoresis. Concern for MSK component. Will give tylenol and gabapentin.  - EKG without ST-segment elevation or depression, HR 89  - Troponin 349>>333>>339 (chronically elevated per chart review)  - BNP 1,072  - CXR with cardiomegaly and perihilar edema  - Continue ASA, statin, plavix, BB, imdur, and ranexa.  - Cardiac monitoring.  - PRN EKG and sl nitro for chest pain  - Recently admitted at [facility] with CP and underwent LHC (see below). No culprit lesion was identified. Cardiology recommended maximal medical therapy given extensive multivessel coronary artery disease which is not amenable to revascularization (either PCI or bypass surgery - transmural scar in distal LAD/left circumflex/PLB noted on PET scan done in October, 2024).    -ECHO 2/10/25:    Left Ventricle: The left ventricle is mildly dilated. Mildly increased wall thickness. There is mild concentric hypertrophy. There is low normal systolic function with a visually estimated ejection fraction of 50 - 55%. Grade II diastolic dysfunction.    Right Ventricle: Normal right ventricular cavity size. Systolic function is normal.    Left Atrium: Left atrium is mildly dilated.    Aortic Valve: The aortic valve is a trileaflet valve. There is moderate aortic valve sclerosis.    Mitral Valve: There is mild to moderate regurgitation.    Tricuspid Valve: There is mild regurgitation.    -Coronary angiography 2/10/25:     Severe multivessel coronary artery disease is present    Distal left main had 30-40% calcific stenosis    Proximal LAD had 70-80% calcific stenosis and 100% stenosis in mid to distal portion.  Distal LAD was getting filled via septal collaterals    2nd diagonal artery had 90% stenosis in midportion    Mid left circumflex artery had 40% calcific stenosis.  Stenosis severity can not be determined accurately  due to presence of eccentric calcification    Mid RCA had 90% InStent restenosis followed by chronic total occlusion.  Distal RCA was getting filled via right to right collaterals and left-to-right collaterals    Right common femoral arterial access was closed with a Perclose device    Low normal LVEDP (7 mmHg)

## 2025-03-01 NOTE — ASSESSMENT & PLAN NOTE
Patient's blood pressure range in the last 24 hours was: BP  Min: 101/59  Max: 118/70.The patient's inpatient anti-hypertensive regimen is listed below:  Current Antihypertensives  nitroGLYCERIN SL tablet 0.4 mg, Every 5 min PRN, Sublingual  carvediloL tablet 25 mg, 2 times daily with meals, Oral  hydrALAZINE tablet 25 mg, Every 8 hours, Oral  isosorbide mononitrate 24 hr tablet 120 mg, Daily, Oral  NIFEdipine 24 hr tablet 90 mg, Daily, Oral  ranolazine 12 hr tablet 500 mg, Daily, Oral    Plan  - BP is controlled, no changes needed to their regimen

## 2025-03-01 NOTE — ASSESSMENT & PLAN NOTE
"Patient's FSGs are controlled with diet alone.   Last A1c reviewed-   Lab Results   Component Value Date    HGBA1C 5.1 01/08/2025     Most recent fingerstick glucose reviewed- No results for input(s): "POCTGLUCOSE" in the last 24 hours.  Current correctional scale  Low  Maintain anti-hyperglycemic dose as follows-   Antihyperglycemics (From admission, onward)      None        - Deferring SSI & CBGs given euglycemia   - Hypoglycemic protocol   "

## 2025-03-01 NOTE — ASSESSMENT & PLAN NOTE
Anemia is likely due to chronic disease due to ESRD. Most recent hemoglobin and hematocrit are listed below.  Recent Labs     02/26/25  0000 02/28/25  1214   HGB 9.2* 9.2*   HCT  --  28.9*     Plan  - Monitor serial CBC: Daily  - Transfuse PRBC if patient becomes hemodynamically unstable, symptomatic or H/H drops below 7/21.  - Patient has not received any PRBC transfusions to date  - Patient's anemia is currently stable

## 2025-03-01 NOTE — PROGRESS NOTES
[facility] [address] [place] [address] Medicine  Progress Note    Patient Name: Haroldo Dickinson  MRN: [id number]  Patient Class: OP- Observation   Admission Date: 2/28/2025  Length of Stay: 0 days  Attending Physician: Anjana Maharaj MD  Primary Care Provider: Mireya Larose MD        Subjective     Principal Problem:Chest pain        HPI:  Haroldo Dickinson is a 49 y.o. male with a PMHx of previous CVA, ACS, CAD, NSTEMI s/p PTCA on Plavix, ESRD on dialysis (MWF), HTN, and renal cell carcinoma s/p nephrectomy who presents to the ED with left-sided 8/10 sharp chest pain radiating to the left arm that occurred while at hemodialysis at 10 AM today. Denies associated SOB, N/V, or diaphoresis. Has has similar pain in the past and states his pain typically starts as a sharp pain in the left arm and eventually spreads to his left lower chest. The patient was given and nitroglycerin 0.4 mg SL X 1 at the HD center. The patient was then given  mg PO X 1 per EMS, which helped the pain subside. The patient then had another episode of 8/10 left-sided chest pain radiating to the left arm that occurred while in the ED which was relieved by nitroglycerin 0.4 mg SL X 1. The patient states they have not had any subsequent episodes of chest pain since the second dose of nitroglycerin. The patient takes 2 nitroglycerin a week when he has sharp left arm pain, which typically relieves his pain before it spreads to his left chest. Also notes tylenol helps as well. The patient reports being fatigued. He denies fever, chills, headache, dyspnea, peripheral edema, vomiting, diarrhea, constipation, or numbness/tingling of the extremities.     In the ED, VSS, afebrile. CBC with stable anemia. Bicarb 21. Cr 6.0 (consistent with ESRD). Glucose 139. Albumin 3.0. BNP 1,072. HS troponin 349>>333>>339. EKG shows SR with LVH, 89 bpm, no ST elevation or depression. CXR with cardiomegaly and perihilar edema in lungs. The patient received sl  nitro.    Overview/Hospital Course:  Patient with known multivessel CAD presenting with recurrent chest pain. HS trop below baseline and flat. Recently started on medical management. Cards consulted. Nephro consulted for HD.     Interval History: patient reports episode of chest pain about 7am this morning that resolved with NTG. Some TTP sternally but patient denies pain is positional.     Review of Systems  Objective:     Vital Signs (Most Recent):  Temp: 97.9 °F (36.6 °C) (03/01/25 0752)  Pulse: 102 (03/01/25 0752)  Resp: 18 (03/01/25 0752)  BP: 130/73 (03/01/25 0752)  SpO2: 95 % (03/01/25 0752) Vital Signs (24h Range):  Temp:  [97.7 °F (36.5 °C)-98.2 °F (36.8 °C)] 97.9 °F (36.6 °C)  Pulse:  [] 102  Resp:  [16-20] 18  SpO2:  [93 %-99 %] 95 %  BP: (101-130)/(55-96) 130/73     Weight: 71.1 kg (156 lb 12 oz)  Body mass index is 24.55 kg/m².  No intake or output data in the 24 hours ending 03/01/25 1010      Physical Exam  Vitals and nursing note reviewed.   Constitutional:       General: He is not in acute distress.     Appearance: He is not ill-appearing, toxic-appearing or diaphoretic.   HENT:      Head: Normocephalic.   Eyes:      General: No scleral icterus.     Pupils: Pupils are equal, round, and reactive to light.   Cardiovascular:      Rate and Rhythm: Regular rhythm. Tachycardia present.      Heart sounds: Murmur heard.      Arteriovenous access: Right arteriovenous access is present.     Comments: +thrill  Pulmonary:      Effort: No respiratory distress.      Breath sounds: Normal breath sounds. No wheezing, rhonchi or rales.   Chest:      Chest wall: Tenderness present.       Abdominal:      General: There is no distension.      Palpations: Abdomen is soft.      Tenderness: There is no abdominal tenderness. There is no guarding.   Musculoskeletal:         General: Normal range of motion.      Cervical back: Normal range of motion.      Right lower leg: No edema.      Left lower leg: No edema.    Skin:     General: Skin is warm and dry.      Capillary Refill: Capillary refill takes less than 2 seconds.   Neurological:      Mental Status: He is alert and oriented to person, place, and time. Mental status is at baseline.   Psychiatric:         Mood and Affect: Mood normal.         Behavior: Behavior normal.             Significant Labs: All pertinent labs within the past 24 hours have been reviewed.    Significant Imaging: I have reviewed all pertinent imaging results/findings within the past 24 hours.    Assessment and Plan     * Chest pain  Acute, patient reports chest pain was resolved on admit. Described as left lower chest wall pain that is sharp in nature. No associated SOB, N/V, or diaphoresis. Concern for MSK component. Will give tylenol and gabapentin. EKG without ST-segment elevation or depression, HR 89.  Troponin 349>>333>>339 (chronically elevated per chart review). BNP 1,072. CXR with cardiomegaly and perihilar edema.   Recently admitted at [facility] with CP and underwent LHC (see below). No culprit lesion was identified. Cardiology recommended maximal medical therapy given extensive multivessel coronary artery disease which is not amenable to revascularization (either PCI or bypass surgery - transmural scar in distal LAD/left circumflex/PLB noted on PET scan done in October, 2024).  - Continue ASA, statin, plavix, BB, imdur, and ranexa.  - Cardiac monitoring.  - PRN EKG and sl nitro for chest pain  - cards consult as chest pain has recurred     -ECHO 2/10/25:    Left Ventricle: The left ventricle is mildly dilated. Mildly increased wall thickness. There is mild concentric hypertrophy. There is low normal systolic function with a visually estimated ejection fraction of 50 - 55%. Grade II diastolic dysfunction.    Right Ventricle: Normal right ventricular cavity size. Systolic function is normal.    Left Atrium: Left atrium is mildly dilated.    Aortic Valve: The aortic valve is a trileaflet  "valve. There is moderate aortic valve sclerosis.    Mitral Valve: There is mild to moderate regurgitation.    Tricuspid Valve: There is mild regurgitation.    -Coronary angiography 2/10/25:     Severe multivessel coronary artery disease is present    Distal left main had 30-40% calcific stenosis    Proximal LAD had 70-80% calcific stenosis and 100% stenosis in mid to distal portion.  Distal LAD was getting filled via septal collaterals    2nd diagonal artery had 90% stenosis in midportion    Mid left circumflex artery had 40% calcific stenosis.  Stenosis severity can not be determined accurately due to presence of eccentric calcification    Mid RCA had 90% InStent restenosis followed by chronic total occlusion.  Distal RCA was getting filled via right to right collaterals and left-to-right collaterals    Right common femoral arterial access was closed with a Perclose device    Low normal LVEDP (7 mmHg)    Coronary artery disease of native artery with stable angina pectoris  Patient with known CAD s/p stent placement, which is uncontrolled Will continue ASA, Plavix, and Statin and monitor for S/Sx of angina/ACS. Continue to monitor on telemetry.     Type 2 diabetes mellitus with chronic kidney disease on chronic dialysis, without long-term current use of insulin  Patient's FSGs are controlled with diet alone.   Last A1c reviewed-   Lab Results   Component Value Date    HGBA1C 5.1 01/08/2025     Most recent fingerstick glucose reviewed- No results for input(s): "POCTGLUCOSE" in the last 24 hours.  Current correctional scale  Low  Maintain anti-hyperglycemic dose as follows-   Antihyperglycemics (From admission, onward)      None        - Deferring SSI & CBGs given euglycemia   - Hypoglycemic protocol     PAD (peripheral artery disease)  History noted.  - Continue ASA, statin, and plavix.    Chronic diastolic heart failure  Patient is identified as having Diastolic (HFpEF) heart failure that is Acute on Chronic. CHF is " currently uncontrolled due to Pulmonary edema/pleural effusion on CXR. Latest ECHO performed and demonstrates- Results for orders placed during the hospital encounter of 02/09/25    Echo    Interpretation Summary    Left Ventricle: The left ventricle is mildly dilated. Mildly increased wall thickness. There is mild concentric hypertrophy. There is low normal systolic function with a visually estimated ejection fraction of 50 - 55%. Grade II diastolic dysfunction.    Right Ventricle: Normal right ventricular cavity size. Systolic function is normal.    Left Atrium: Left atrium is mildly dilated.    Aortic Valve: The aortic valve is a trileaflet valve. There is moderate aortic valve sclerosis.    Mitral Valve: There is mild to moderate regurgitation.    Tricuspid Valve: There is mild regurgitation.  . Continue Beta Blocker Nitrate/Vasodilator and monitor clinical status closely. Monitor on telemetry. Patient is off CHF pathway.  Monitor strict Is&Os and daily weights.  Place on fluid restriction of 1.5 L. Continue to stress to patient importance of self efficacy and  on diet for CHF. Last BNP reviewed- and noted below   Recent Labs   Lab 02/28/25  1214   BNP 1,072*     -CXR with cardiomegaly and pulmonary edema.  -No peripheral edema on exam.  -Volume management per HD.    Chronic kidney disease-mineral and bone disorder  History noted.  - Continue renvela.    Anemia in end-stage renal disease  Anemia is likely due to chronic disease due to ESRD. Most recent hemoglobin and hematocrit are listed below.  Recent Labs     02/28/25  1214 03/01/25  0535   HGB 9.2* 9.5*   HCT 28.9* 30.1*       Plan  - Monitor serial CBC: Daily  - Transfuse PRBC if patient becomes hemodynamically unstable, symptomatic or H/H drops below 7/21.  - Patient has not received any PRBC transfusions to date  - Patient's anemia is currently stable    Essential hypertension  Patient's blood pressure range in the last 24 hours was: BP  Min:  101/62  Max: 130/73.The patient's inpatient anti-hypertensive regimen is listed below:  Current Antihypertensives  carvediloL tablet 25 mg, 2 times daily with meals, Oral  hydrALAZINE tablet 25 mg, Every 8 hours, Oral  NIFEdipine 24 hr tablet 90 mg, Daily, Oral  ranolazine 12 hr tablet 500 mg, Daily, Oral  minoxidiL tablet 10 mg, 2 times daily, Oral  isosorbide mononitrate 24 hr tablet 120 mg, Daily, Oral    Plan  - BP is controlled, no changes needed to their regimen    ESRD on hemodialysis  MWF schedule, dialyzed today but stopped after 1hr and 45min due to CP  Residual renal function?- No    - Nephrology consulted  - Continue chronic hemodialysis  - Monitor daily electrolytes and defer dialysis orders to nephrology  - Renally dose medications  - Renal diet  - Continue phosphorus binders  - Daily weights/strict I&Os  - 1.5L FR     Hemiparesis affecting right side as late effect of cerebrovascular accident  History noted.  - Continue ASA, statin, and plavix.  - Fall precautions.      VTE Risk Mitigation (From admission, onward)           Ordered     heparin (porcine) injection 5,000 Units  Every 8 hours         02/28/25 1906     IP VTE HIGH RISK PATIENT  Once         02/28/25 1906     Place sequential compression device  Until discontinued         02/28/25 1906                    Discharge Planning   QUIN: 3/2/2025     Code Status: Full Code   Medical Readiness for Discharge Date:                            Anjana Maharaj MD  Department of Hospital Medicine   [facility]

## 2025-03-01 NOTE — PLAN OF CARE
Problem: Adult Inpatient Plan of Care  Goal: Plan of Care Review  Outcome: Progressing  Goal: Patient-Specific Goal (Individualized)  Outcome: Progressing  Goal: Absence of Hospital-Acquired Illness or Injury  Outcome: Progressing  Goal: Optimal Comfort and Wellbeing  Outcome: Progressing  Goal: Readiness for Transition of Care  Outcome: Progressing     Problem: Diabetes Comorbidity  Goal: Blood Glucose Level Within Targeted Range  Outcome: Progressing     Problem: Comorbidity Management  Goal: Blood Pressure in Desired Range  Outcome: Progressing     Problem: Chest Pain  Goal: Resolution of Chest Pain Symptoms  Outcome: Progressing     Problem: Hemodialysis  Goal: Safe, Effective Therapy Delivery  Outcome: Progressing  Goal: Effective Tissue Perfusion  Outcome: Progressing  Goal: Absence of Infection Signs and Symptoms  Outcome: Progressing

## 2025-03-01 NOTE — ASSESSMENT & PLAN NOTE
Patient with known CAD s/p stent placement, which is uncontrolled Will continue ASA, Plavix, and Statin  - Pt with Hx of Mid RCA disease s/p PCI 7/25/19   - Presented with recurrent chest pain.   - Recently admitted at [facility] with CP and underwent C; medical therapy recommended   - ECHO 2/10/25 - EF 50-55%      [facility] Cath 11/28/23     There was three vessel coronary artery disease.    The Mid LAD lesion was 90% stenosed.    The Mid RCA lesion was 100% stenosed.    The Prox LAD lesion was 75% stenosed.    The pre-procedure left ventricular end diastolic pressure was 26.    There was diastolic dysfunction.    Interval worsening of mid LAD disease. Given diffuse nature of CAD will recommend medcial therapy and HD optimization to improve left sided filling pressures    The estimated blood loss was <50 mL.     Cath 2/10/25:     Severe multivessel coronary artery disease is present    Distal left main had 30-40% calcific stenosis    Proximal LAD had 70-80% calcific stenosis and 100% stenosis in mid to distal portion.  Distal LAD was getting filled via septal collaterals    2nd diagonal artery had 90% stenosis in midportion    Mid left circumflex artery had 40% calcific stenosis.  Stenosis severity can not be determined accurately due to presence of eccentric calcification    Mid RCA had 90% InStent restenosis followed by chronic total occlusion.  Distal RCA was getting filled via right to right collaterals and left-to-right collaterals    Right common femoral arterial access was closed with a Perclose device    Low normal LVEDP (7 mmHg)    Recommendation   - Give pt diffuse disease, and as per record from IC recommendations : No culprit lesion was identified on current study and given extensive multivessel coronary artery disease which is not amenable to revascularization (either PCI or bypass surgery - transmural scar in distal LAD/left circumflex/PLB noted on PET scan done in October, 2024)  recommended maximal  medical therapy  - Continue ASA 81 mg qd   - Continue with Lipitor 80 mg   - Continue with Plavix  75 mg   - Continue with coreg 25 mg BID   - Continue with nitro sublingual prn   - Continue with ranolazine 500 mg qd   - Continue with Isosorbide mononitrate   - Pt is on adequate medical therapy.   - Follow up with cardiology clinic upon discharge for further management

## 2025-03-01 NOTE — ASSESSMENT & PLAN NOTE
Patient is identified as having Diastolic (HFpEF) heart failure that is Acute on Chronic. CHF is currently uncontrolled due to Pulmonary edema/pleural effusion on CXR. Latest ECHO performed and demonstrates- Results for orders placed during the hospital encounter of 02/09/25    Echo    Interpretation Summary    Left Ventricle: The left ventricle is mildly dilated. Mildly increased wall thickness. There is mild concentric hypertrophy. There is low normal systolic function with a visually estimated ejection fraction of 50 - 55%. Grade II diastolic dysfunction.    Right Ventricle: Normal right ventricular cavity size. Systolic function is normal.    Left Atrium: Left atrium is mildly dilated.    Aortic Valve: The aortic valve is a trileaflet valve. There is moderate aortic valve sclerosis.    Mitral Valve: There is mild to moderate regurgitation.    Tricuspid Valve: There is mild regurgitation.  . Continue Beta Blocker Nitrate/Vasodilator and monitor clinical status closely. Monitor on telemetry. Patient is off CHF pathway.  Monitor strict Is&Os and daily weights.  Place on fluid restriction of 1.5 L. Continue to stress to patient importance of self efficacy and  on diet for CHF. Last BNP reviewed- and noted below   Recent Labs   Lab 02/28/25  1214   BNP 1,072*     -CXR with cardiomegaly and pulmonary edema.  -No peripheral edema on exam.  -Volume management per HD.

## 2025-03-01 NOTE — SUBJECTIVE & OBJECTIVE
Past Medical History:   Diagnosis Date    CAD (coronary artery disease), native coronary artery 11/21/2019    Cataract     Diabetes mellitus     Diabetic retinopathy     Dialysis patient     DM type 2 causing renal disease, not at goal     ESRD (end stage renal disease) started dialysis 01/2014 06/05/2014    H/o acute coronary syndrome with high troponin 03/11/2024    History of colon polyps 02/10/2021    History of COVID-19 12/29/2022    Hyperparathyroidism, secondary renal 06/05/2014    Hypertension     Hypertensive emergency 11/27/2023    NSTEMI (non-ST elevated myocardial infarction) 12/21/2013    Organ transplant candidate 06/05/2014    Palliative care encounter 10/29/2024    Pleural effusion 06/07/2024    Pneumonia     Post PTCA 08/26/2020    RCA HILLARY 7-25-20    Renal cell carcinoma of right kidney     Renal hypertension     Stroke        Past Surgical History:   Procedure Laterality Date    ANGIOGRAM, CORONARY, WITH LEFT HEART CATHETERIZATION N/A 7/1/2021    Procedure: Angiogram, Coronary, with Left Heart Cath;  Surgeon: Miki Zendejas MD;  Location: [facility] CATH LAB;  Service: Cardiology;  Laterality: N/A;    ANGIOGRAM, CORONARY, WITH LEFT HEART CATHETERIZATION N/A 11/28/2023    Procedure: Angiogram, Coronary, with Left Heart Cath;  Surgeon: Noah Pendleton MD;  Location: [facility] CATH LAB;  Service: Cardiology;  Laterality: N/A;    COLONOSCOPY N/A 5/3/2017    Procedure: COLONOSCOPY;  Surgeon: Rufino Carpenter MD;  Location: [address]);  Service: Endoscopy;  Laterality: N/A;  pt states can only schedule on Wednesdays    COLONOSCOPY N/A 2/9/2021    Procedure: COLONOSCOPY;  Surgeon: Edil Wong MD;  Location: [address]);  Service: Endoscopy;  Laterality: N/A;  Dialysis MWF/ labwork day of procedure  right arm aceess  per Dr. Colin pt can hold Plavix 5 days prior see note- sm  COVID test on 2/6/21 at [facility] - sm    COLONOSCOPY N/A 2/10/2021    Procedure: COLONOSCOPY;  Surgeon: Robert Ayers,  MD;  Location: [facility] ENDO (4TH FLR);  Service: Endoscopy;  Laterality: N/A;  rescheduled due to poor bowel prep-BB  negative covid screening 2/6/21-BB  dialysis M-W-F-BB  okay to r/s for 2/9/21 and to hold Plavix per Dr. KIRAN Wong-BB  labs same day-BB    CORONARY ANGIOGRAPHY N/A 2/10/2025    Procedure: ANGIOGRAM, CORONARY ARTERY;  Surgeon: Ajith Hutchins MD;  Location: [facility] CATH LAB;  Service: Cardiology;  Laterality: N/A;    CORONARY STENT PLACEMENT N/A 7/25/2019    Procedure: INSERTION, STENT, CORONARY ARTERY;  Surgeon: Miki Zendejas MD;  Location: [facility] CATH LAB;  Service: Cardiology;  Laterality: N/A;    DIALYSIS FISTULA CREATION      FISTULOGRAM N/A 2/18/2019    Procedure: Fistulogram;  Surgeon: Yaneth De La Cruz MD;  Location: [facility] CATH LAB;  Service: Cardiology;  Laterality: N/A;    FISTULOGRAM Right 7/23/2019    Procedure: Fistulogram;  Surgeon: Oz Cordoba MD;  Location: [facility] CATH LAB;  Service: Cardiology;  Laterality: Right;    LAPAROSCOPIC ROBOT-ASSISTED SURGICAL REMOVAL OF KIDNEY USING DA JAIME XI Right 5/19/2022    Procedure: XI ROBOTIC NEPHRECTOMY;  Surgeon: Aidan Hendricks MD;  [address];  Service: Urology;  Laterality: Right;  3hrs    LAPAROSCOPIC ROBOT-ASSISTED SURGICAL REMOVAL OF KIDNEY USING DA JAIME XI Left 1/5/2023    Procedure: XI ROBOTIC NEPHRECTOMY;  Surgeon: Aidan Hendricks MD;  [address];  Service: Urology;  Laterality: Left;  4 hrs    LEFT HEART CATHETERIZATION Left 7/25/2019    Procedure: Left heart cath;  Surgeon: Miki Zendejas MD;  Location: [facility] CATH LAB;  Service: Cardiology;  Laterality: Left;    LEFT HEART CATHETERIZATION Left 2/10/2025    Procedure: Left heart cath;  Surgeon: Ajith Hutchins MD;  Location: [facility] CATH LAB;  Service: Cardiology;  Laterality: Left;    REPAIR  1/5/2023    Procedure: REPAIR; COLOTOMY;  Surgeon: Maikel Lamb MD;  Location: [facility] 2ND FLR;  Service: General;;    RETINAL LASER PROCEDURE  Bilateral 2018 or 2017    Dr. Rothman    VASCULAR SURGERY         Review of patient's allergies indicates:   Allergen Reactions    No known allergies        Current Facility-Administered Medications on File Prior to Encounter   Medication    lidocaine (PF) 10 mg/ml (1%) injection 10 mg     Current Outpatient Medications on File Prior to Encounter   Medication Sig    aspirin (ECOTRIN) 81 MG EC tablet Take 1 tablet (81 mg total) by mouth once daily.    atorvastatin (LIPITOR) 80 MG tablet Take 1 tablet (80 mg total) by mouth once daily.    carvediloL (COREG) 25 MG tablet Take 1 tablet (25 mg total) by mouth 2 (two) times daily with meals.    clopidogreL (PLAVIX) 75 mg tablet Take 1 tablet (75 mg total) by mouth once daily.    epoetin philip (PROCRIT) 4,000 unit/mL injection Inject 1.93 mLs (7,720 Units total) into the skin every Mon, Wed, Fri.    ezetimibe (ZETIA) 10 mg tablet Take 1 tablet (10 mg total) by mouth once daily.    hydrALAZINE (APRESOLINE) 25 MG tablet Take 1 tablet (25 mg total) by mouth every 8 (eight) hours.    isosorbide mononitrate (IMDUR) 120 MG 24 hr tablet Take 1 tablet (120 mg total) by mouth once daily.    methoxy peg-epoetin beta (MIRCERA INJ) 150 mcg.    minoxidiL (LONITEN) 10 MG Tab Take 1 tablet (10 mg total) by mouth 2 (two) times daily.    NIFEdipine (PROCARDIA-XL) 90 MG (OSM) 24 hr tablet Take 1 tablet (90 mg total) by mouth once daily.    nitroGLYCERIN (NITROSTAT) 0.4 MG SL tablet Place 1 tablet (0.4 mg total) under the tongue every 5 (five) minutes as needed for Chest pain.    ranolazine (RANEXA) 500 MG Tb12 Take 1 tablet (500 mg total) by mouth Daily.    sevelamer carbonate (RENVELA) 800 mg Tab Take 2 tablets (1,600 mg total) by mouth 3 (three) times daily with meals.    tenapanor (XPHOZAH) 20 mg Tab Take 1 tablet by mouth.     Family History       Problem Relation (Age of Onset)    Cancer Maternal Grandfather    Cataracts Mother    Diabetes Mother    Heart disease Brother    Hypertension  Mother, Father, Sister, Brother    Kidney disease Brother          Tobacco Use    Smoking status: Never    Smokeless tobacco: Never   Substance and Sexual Activity    Alcohol use: Not Currently     Comment: One drink a month    Drug use: No    Sexual activity: Yes     Partners: Female     Birth control/protection: None     Review of Systems   Constitutional: Negative.   Cardiovascular: Negative.    Respiratory: Negative.     Neurological: Negative.      Objective:     Vital Signs (Most Recent):  Temp: 97.9 °F (36.6 °C) (03/01/25 0752)  Pulse: 96 (03/01/25 1113)  Resp: 18 (03/01/25 0752)  BP: 130/73 (03/01/25 0752)  SpO2: 95 % (03/01/25 0752) Vital Signs (24h Range):  Temp:  [97.7 °F (36.5 °C)-98.2 °F (36.8 °C)] 97.9 °F (36.6 °C)  Pulse:  [] 96  Resp:  [16-20] 18  SpO2:  [93 %-99 %] 95 %  BP: (101-130)/(55-96) 130/73     Weight: 71.1 kg (156 lb 12 oz)  Body mass index is 24.55 kg/m².    SpO2: 95 %       No intake or output data in the 24 hours ending 03/01/25 1131    Lines/Drains/Airways       Peripheral Intravenous Line  Duration                  Hemodialysis AV Fistula Right forearm -- days         Peripheral IV - Single Lumen 02/28/25 1213 18 G Left Forearm <1 day                     Physical Exam   Constitutional:       General: He is not in acute distress.     Appearance: He is not ill-appearing, toxic-appearing or diaphoretic.   HENT:      Head: Normocephalic.   Eyes:      General: No scleral icterus.     Pupils: Pupils are equal, round, and reactive to light.   Cardiovascular:      Rate and Rhythm: Regular rhythm. Tachycardia present.      Heart sounds: Murmur heard.      Arteriovenous access: Right arteriovenous access is present.     Comments: +thrill  Pulmonary:      Effort: No respiratory distress.      Breath sounds: Normal breath sounds. No wheezing, rhonchi or rales.      Significant Labs:   Recent Labs   Lab 02/26/25  0000 02/28/25  1214 03/01/25  0535   WBC  --  9.73 12.83*   HGB 9.2* 9.2* 9.5*    HCT  --  28.9* 30.1*   PLT  --  387 435       Recent Labs   Lab 02/24/25  0000 02/28/25  1214 03/01/25  0535   NA  --  136 136   K  --  4.0 4.2   CL  --  104 103   CO2  --  21* 20*   BUN 76* 38* 49*   CREATININE  --  6.0* 8.3*   CALCIUM  --  8.7 9.3   PHOS  --   --  5.1*       Recent Labs   Lab 02/28/25  1214 03/01/25  0535   ALKPHOS 88 91   BILITOT 0.5 0.6   PROT 7.6 7.8   ALT 9* 8*   AST 17 16       Lab Results   Component Value Date    CHOL 110 (L) 02/10/2025    HDL 58 02/10/2025    LDLCALC 42.8 (L) 02/10/2025    TRIG 46 02/10/2025       Lab Results   Component Value Date    HGBA1C 5.1 01/08/2025    HGBA1C 4.7 03/11/2024       Lab Results   Component Value Date    BNP 1,072 (H) 02/28/2025     (H) 02/09/2025    BNP 1,322 (H) 12/27/2024       Significant Imaging:   -ECHO 2/10/25:    Left Ventricle: The left ventricle is mildly dilated. Mildly increased wall thickness. There is mild concentric hypertrophy. There is low normal systolic function with a visually estimated ejection fraction of 50 - 55%. Grade II diastolic dysfunction.    Right Ventricle: Normal right ventricular cavity size. Systolic function is normal.    Left Atrium: Left atrium is mildly dilated.    Aortic Valve: The aortic valve is a trileaflet valve. There is moderate aortic valve sclerosis.    Mitral Valve: There is mild to moderate regurgitation.    Tricuspid Valve: There is mild regurgitation.     -Coronary angiography 2/10/25:     Severe multivessel coronary artery disease is present    Distal left main had 30-40% calcific stenosis    Proximal LAD had 70-80% calcific stenosis and 100% stenosis in mid to distal portion.  Distal LAD was getting filled via septal collaterals    2nd diagonal artery had 90% stenosis in midportion    Mid left circumflex artery had 40% calcific stenosis.  Stenosis severity can not be determined accurately due to presence of eccentric calcification    Mid RCA had 90% InStent restenosis followed by chronic  total occlusion.  Distal RCA was getting filled via right to right collaterals and left-to-right collaterals    Right common femoral arterial access was closed with a Perclose device    Low normal LVEDP (7 mmHg)

## 2025-03-01 NOTE — ASSESSMENT & PLAN NOTE
MWF schedule, dialyzed today but stopped after 1hr and 45min due to CP  Residual renal function?- No    - Nephrology consulted  - Continue chronic hemodialysis  - Monitor daily electrolytes and defer dialysis orders to nephrology  - Renally dose medications  - Renal diet  - Continue phosphorus binders  - Daily weights/strict I&Os  - 1.5L FR

## 2025-03-01 NOTE — ASSESSMENT & PLAN NOTE
ESRD on iHD MWF  [facility]  4 hours  EDW:  69.5 kg  RFA AVF    Plan/Recommendations:  -no emergent need for RRT today  -his dialysis unit will be closed on Tuesday, patients have been adjusted to dialyze tomorrow.  -will plan to monitor today, plan for HD after midnight to maintain OP schedule  -UF 2L as tolerated  -will defer EPO to his OP dialysis unit  -high risk of mortality due to comorbid conditions

## 2025-03-01 NOTE — PLAN OF CARE
Problem: Adult Inpatient Plan of Care  Goal: Plan of Care Review  Outcome: Progressing     Problem: Comorbidity Management  Goal: Blood Pressure in Desired Range  Outcome: Progressing     Problem: Chest Pain  Goal: Resolution of Chest Pain Symptoms  Outcome: Progressing     Problem: Diabetes Comorbidity  Goal: Blood Glucose Level Within Targeted Range      Outcome: Progressing

## 2025-03-01 NOTE — SUBJECTIVE & OBJECTIVE
Interval History: patient reports episode of chest pain about 7am this morning that resolved with NTG. Some TTP sternally but patient denies pain is positional.     Review of Systems  Objective:     Vital Signs (Most Recent):  Temp: 97.9 °F (36.6 °C) (03/01/25 0752)  Pulse: 102 (03/01/25 0752)  Resp: 18 (03/01/25 0752)  BP: 130/73 (03/01/25 0752)  SpO2: 95 % (03/01/25 0752) Vital Signs (24h Range):  Temp:  [97.7 °F (36.5 °C)-98.2 °F (36.8 °C)] 97.9 °F (36.6 °C)  Pulse:  [] 102  Resp:  [16-20] 18  SpO2:  [93 %-99 %] 95 %  BP: (101-130)/(55-96) 130/73     Weight: 71.1 kg (156 lb 12 oz)  Body mass index is 24.55 kg/m².  No intake or output data in the 24 hours ending 03/01/25 1010      Physical Exam  Vitals and nursing note reviewed.   Constitutional:       General: He is not in acute distress.     Appearance: He is not ill-appearing, toxic-appearing or diaphoretic.   HENT:      Head: Normocephalic.   Eyes:      General: No scleral icterus.     Pupils: Pupils are equal, round, and reactive to light.   Cardiovascular:      Rate and Rhythm: Regular rhythm. Tachycardia present.      Heart sounds: Murmur heard.      Arteriovenous access: Right arteriovenous access is present.     Comments: +thrill  Pulmonary:      Effort: No respiratory distress.      Breath sounds: Normal breath sounds. No wheezing, rhonchi or rales.   Chest:      Chest wall: Tenderness present.       Abdominal:      General: There is no distension.      Palpations: Abdomen is soft.      Tenderness: There is no abdominal tenderness. There is no guarding.   Musculoskeletal:         General: Normal range of motion.      Cervical back: Normal range of motion.      Right lower leg: No edema.      Left lower leg: No edema.   Skin:     General: Skin is warm and dry.      Capillary Refill: Capillary refill takes less than 2 seconds.   Neurological:      Mental Status: He is alert and oriented to person, place, and time. Mental status is at baseline.    Psychiatric:         Mood and Affect: Mood normal.         Behavior: Behavior normal.             Significant Labs: All pertinent labs within the past 24 hours have been reviewed.    Significant Imaging: I have reviewed all pertinent imaging results/findings within the past 24 hours.

## 2025-03-01 NOTE — ASSESSMENT & PLAN NOTE
Lasted ECHO: 2/10/25  estimated ejection fraction of 50 - 55%. Grade II diastolic dysfunction.  - CXR notable for pulmonary edema and pleural effusion   - Pt with ESRD on HD.   - No peripheral edema on exam.    Recommendations   - Volume management per HD  - Continue with Hydralazine 25 mg q 8   - Recommend 2 gram sodium restriction and 1500cc fluid restriction.  - Encourage physical activity with graded exercise program.  - Monitor fluid intake

## 2025-03-01 NOTE — SUBJECTIVE & OBJECTIVE
Past Medical History:   Diagnosis Date    CAD (coronary artery disease), native coronary artery 11/21/2019    Cataract     Diabetes mellitus     Diabetic retinopathy     Dialysis patient     DM type 2 causing renal disease, not at goal     ESRD (end stage renal disease) started dialysis 01/2014 06/05/2014    H/o acute coronary syndrome with high troponin 03/11/2024    History of colon polyps 02/10/2021    History of COVID-19 12/29/2022    Hyperparathyroidism, secondary renal 06/05/2014    Hypertension     Hypertensive emergency 11/27/2023    NSTEMI (non-ST elevated myocardial infarction) 12/21/2013    Organ transplant candidate 06/05/2014    Palliative care encounter 10/29/2024    Pleural effusion 06/07/2024    Pneumonia     Post PTCA 08/26/2020    RCA HILLARY 7-25-20    Renal cell carcinoma of right kidney     Renal hypertension     Stroke        Past Surgical History:   Procedure Laterality Date    ANGIOGRAM, CORONARY, WITH LEFT HEART CATHETERIZATION N/A 7/1/2021    Procedure: Angiogram, Coronary, with Left Heart Cath;  Surgeon: Miki Zendejas MD;  Location: [facility] CATH LAB;  Service: Cardiology;  Laterality: N/A;    ANGIOGRAM, CORONARY, WITH LEFT HEART CATHETERIZATION N/A 11/28/2023    Procedure: Angiogram, Coronary, with Left Heart Cath;  Surgeon: Noah Pendleton MD;  Location: [facility] CATH LAB;  Service: Cardiology;  Laterality: N/A;    COLONOSCOPY N/A 5/3/2017    Procedure: COLONOSCOPY;  Surgeon: Rufino Carpenter MD;  Location: [address]);  Service: Endoscopy;  Laterality: N/A;  pt states can only schedule on Wednesdays    COLONOSCOPY N/A 2/9/2021    Procedure: COLONOSCOPY;  Surgeon: Edil Wong MD;  Location: [address]);  Service: Endoscopy;  Laterality: N/A;  Dialysis MWF/ labwork day of procedure  right arm aceess  per Dr. Colin pt can hold Plavix 5 days prior see note- sm  COVID test on 2/6/21 at [facility] - [facility]    COLONOSCOPY N/A 2/10/2021    Procedure: COLONOSCOPY;  Surgeon: Robert Ayers,  MD;  Location: [facility] ENDO (4TH FLR);  Service: Endoscopy;  Laterality: N/A;  rescheduled due to poor bowel prep-BB  negative covid screening 2/6/21-BB  dialysis M-W-F-BB  okay to r/s for 2/9/21 and to hold Plavix per Dr. KIRAN Wong-BB  labs same day-BB    CORONARY ANGIOGRAPHY N/A 2/10/2025    Procedure: ANGIOGRAM, CORONARY ARTERY;  Surgeon: Ajith Hutchins MD;  Location: [facility] CATH LAB;  Service: Cardiology;  Laterality: N/A;    CORONARY STENT PLACEMENT N/A 7/25/2019    Procedure: INSERTION, STENT, CORONARY ARTERY;  Surgeon: Miki Zendejas MD;  Location: [facility] CATH LAB;  Service: Cardiology;  Laterality: N/A;    DIALYSIS FISTULA CREATION      FISTULOGRAM N/A 2/18/2019    Procedure: Fistulogram;  Surgeon: Yaneth De La Cruz MD;  Location: [facility] CATH LAB;  Service: Cardiology;  Laterality: N/A;    FISTULOGRAM Right 7/23/2019    Procedure: Fistulogram;  Surgeon: Oz Cordoba MD;  Location: [facility] CATH LAB;  Service: Cardiology;  Laterality: Right;    LAPAROSCOPIC ROBOT-ASSISTED SURGICAL REMOVAL OF KIDNEY USING DA JAIME XI Right 5/19/2022    Procedure: XI ROBOTIC NEPHRECTOMY;  Surgeon: Aidan Hendricks MD;  [address];  Service: Urology;  Laterality: Right;  3hrs    LAPAROSCOPIC ROBOT-ASSISTED SURGICAL REMOVAL OF KIDNEY USING DA JAIME XI Left 1/5/2023    Procedure: XI ROBOTIC NEPHRECTOMY;  Surgeon: Aidan Hendricks MD;  [address];  Service: Urology;  Laterality: Left;  4 hrs    LEFT HEART CATHETERIZATION Left 7/25/2019    Procedure: Left heart cath;  Surgeon: Miki Zendejas MD;  Location: [facility] CATH LAB;  Service: Cardiology;  Laterality: Left;    LEFT HEART CATHETERIZATION Left 2/10/2025    Procedure: Left heart cath;  Surgeon: Ajith Hutchins MD;  Location: [facility] CATH LAB;  Service: Cardiology;  Laterality: Left;    REPAIR  1/5/2023    Procedure: REPAIR; COLOTOMY;  Surgeon: Maikel Lamb MD;  Location: [facility] 2ND FLR;  Service: General;;    RETINAL LASER PROCEDURE  Bilateral 2018 or 2017    Dr. Rothman    VASCULAR SURGERY         Review of patient's allergies indicates:   Allergen Reactions    No known allergies      Current Facility-Administered Medications   Medication Frequency    acetaminophen tablet 650 mg Q4H PRN    aspirin EC tablet 81 mg Daily    atorvastatin tablet 80 mg QHS    carvediloL tablet 25 mg BID WM    clopidogreL tablet 75 mg Daily    dextrose 50% injection 12.5 g PRN    dextrose 50% injection 25 g PRN    ezetimibe tablet 10 mg Daily    glucagon (human recombinant) injection 1 mg PRN    glucose chewable tablet 16 g PRN    glucose chewable tablet 24 g PRN    heparin (porcine) injection 5,000 Units Q8H    hydrALAZINE tablet 25 mg Q8H    isosorbide mononitrate 24 hr tablet 120 mg Daily    melatonin tablet 6 mg Nightly PRN    minoxidiL tablet 10 mg BID    naloxone 0.4 mg/mL injection 0.02 mg PRN    NIFEdipine 24 hr tablet 90 mg Daily    nitroGLYCERIN SL tablet 0.4 mg Q5 Min PRN    ondansetron injection 4 mg Q8H PRN    ranolazine 12 hr tablet 500 mg Daily    sevelamer carbonate tablet 1,600 mg TID WM    sodium chloride 0.9% flush 10 mL Q12H PRN     Facility-Administered Medications Ordered in Other Encounters   Medication Frequency    lidocaine (PF) 10 mg/ml (1%) injection 10 mg Once     Family History       Problem Relation (Age of Onset)    Cancer Maternal Grandfather    Cataracts Mother    Diabetes Mother    Heart disease Brother    Hypertension Mother, Father, Sister, Brother    Kidney disease Brother          Tobacco Use    Smoking status: Never    Smokeless tobacco: Never   Substance and Sexual Activity    Alcohol use: Not Currently     Comment: One drink a month    Drug use: No    Sexual activity: Yes     Partners: Female     Birth control/protection: None     Review of Systems   Constitutional:  Negative for activity change, appetite change, chills, diaphoresis, fatigue and fever.   HENT:  Negative for congestion, rhinorrhea and sore throat.    Eyes:   Negative for photophobia and visual disturbance.   Respiratory:  Negative for cough, shortness of breath and wheezing.    Cardiovascular:  Negative for chest pain, palpitations and leg swelling.   Gastrointestinal:  Negative for abdominal distention, abdominal pain, diarrhea, nausea and vomiting.   Genitourinary:  Positive for decreased urine volume.   Musculoskeletal:  Positive for myalgias. Negative for arthralgias, gait problem, joint swelling and neck pain.   Skin:  Negative for color change, pallor and rash.   Neurological:  Negative for dizziness, syncope, weakness, light-headedness, numbness and headaches.   Psychiatric/Behavioral:  Negative for confusion and hallucinations. The patient is not nervous/anxious.      Objective:     Vital Signs (Most Recent):  Temp: 98.2 °F (36.8 °C) (03/01/25 1209)  Pulse: 98 (03/01/25 1209)  Resp: 18 (03/01/25 1209)  BP: 120/70 (03/01/25 1209)  SpO2: (!) 92 % (03/01/25 1209) Vital Signs (24h Range):  Temp:  [97.7 °F (36.5 °C)-98.2 °F (36.8 °C)] 98.2 °F (36.8 °C)  Pulse:  [] 98  Resp:  [16-20] 18  SpO2:  [92 %-99 %] 92 %  BP: (101-130)/(55-96) 120/70     Weight: 71.1 kg (156 lb 12 oz) (02/28/25 2332)  Body mass index is 24.55 kg/m².  Body surface area is 1.83 meters squared.    No intake/output data recorded.     Physical Exam  Vitals and nursing note reviewed.   Constitutional:       General: He is not in acute distress.     Appearance: He is not ill-appearing, toxic-appearing or diaphoretic.   HENT:      Head: Normocephalic and atraumatic.      Nose: Nose normal.      Mouth/Throat:      Mouth: Mucous membranes are moist.   Eyes:      Pupils: Pupils are equal, round, and reactive to light.   Cardiovascular:      Rate and Rhythm: Normal rate and regular rhythm.      Pulses: Normal pulses.      Heart sounds: Murmur heard.      Arteriovenous access: Right arteriovenous access is present.     Comments: +thrill  Pulmonary:      Effort: Pulmonary effort is normal. No  respiratory distress.      Breath sounds: No wheezing, rhonchi or rales.   Chest:      Chest wall: No tenderness.   Abdominal:      General: Bowel sounds are normal. There is no distension.      Palpations: Abdomen is soft.      Tenderness: There is no abdominal tenderness. There is no guarding.   Musculoskeletal:         General: Normal range of motion.      Cervical back: Normal range of motion.      Right lower leg: No edema.      Left lower leg: No edema.   Skin:     General: Skin is warm and dry.      Capillary Refill: Capillary refill takes less than 2 seconds.   Neurological:      General: No focal deficit present.      Mental Status: He is alert and oriented to person, place, and time.      Sensory: No sensory deficit.      Motor: No weakness.   Psychiatric:         Mood and Affect: Mood normal.         Behavior: Behavior normal.          Significant Labs:  CBC:   Recent Labs   Lab 03/01/25  0535   WBC 12.83*   RBC 3.16*   HGB 9.5*   HCT 30.1*      MCV 95   MCH 30.1   MCHC 31.6*     CMP:   Recent Labs   Lab 03/01/25  0535   *   CALCIUM 9.3   ALBUMIN 3.1*   PROT 7.8      K 4.2   CO2 20*      BUN 49*   CREATININE 8.3*   ALKPHOS 91   ALT 8*   AST 16   BILITOT 0.6

## 2025-03-01 NOTE — ASSESSMENT & PLAN NOTE
Patient is identified as having Diastolic (HFpEF) heart failure that is Acute on Chronic. CHF is currently uncontrolled due to Pulmonary edema/pleural effusion on CXR. Latest ECHO performed and demonstrates- Results for orders placed during the hospital encounter of 02/09/25    Echo    Interpretation Summary    Left Ventricle: The left ventricle is mildly dilated. Mildly increased wall thickness. There is mild concentric hypertrophy. There is low normal systolic function with a visually estimated ejection fraction of 50 - 55%. Grade II diastolic dysfunction.    Right Ventricle: Normal right ventricular cavity size. Systolic function is normal.    Left Atrium: Left atrium is mildly dilated.    Aortic Valve: The aortic valve is a trileaflet valve. There is moderate aortic valve sclerosis.    Mitral Valve: There is mild to moderate regurgitation.    Tricuspid Valve: There is mild regurgitation.  . Continue Beta Blocker Nitrate/Vasodilator and monitor clinical status closely. Monitor on telemetry. Patient is off CHF pathway.  Monitor strict Is&Os and daily weights.  Place on fluid restriction of 1.5 L. Continue to stress to patient importance of self efficacy and  on diet for CHF. Last BNP reviewed- and noted below   Recent Labs   Lab 02/28/25  1214   BNP 1,072*   -CXR with cardiomegaly and pulmonary edema.  -No peripheral edema on exam.  -Volume management per HD.   Silvadene cream bid   Keep dressing dry clean  Change dressing twice daily  F/u with PCP in 2-3 days

## 2025-03-01 NOTE — HPI
Haroldo Dickinson is a 49 y.o. male with a previous CVA, ACS, CAD, NSTEMI s/p PTCA on Plavix, ESRD on dialysis (MWF), HTN, and renal cell carcinoma s/p nephrectomy who presents to the ED with left-sided 8/10 sharp chest pain radiating to the left arm that occurred while at hemodialysis. Denies associated SOB, N/V, or diaphoresis. Has has similar pain in the past and states his pain typically starts as a sharp pain in the left arm and eventually spreads to his left lower chest. The patient was given and nitroglycerin 0.4 mg SL X 1 at the HD center. The patient was then given  mg PO X 1 per EMS, which helped the pain subside. The patient then had another episode of 8/10 left-sided chest pain radiating to the left arm that occurred while in the ED which was relieved by nitroglycerin 0.4 mg SL X 1. The patient states they have not had any subsequent episodes of chest pain since the second dose of nitroglycerin. The patient takes 2 nitroglycerin a week when he has sharp left arm pain, which typically relieves his pain before it spreads to his left chest. Also notes tylenol helps as well. In the ED, VSS, afebrile. . BNP 1,072. HS troponin 349>>333>>339.  CXR with cardiomegaly and mild edema.  Nephrology was consulted for ESRD management while IP.    He has extensive cardiac hx, ecently admitted at [facility] with CP and underwent LHC. No culprit lesion was identified. Cardiology recommended maximal medical therapy given extensive multivessel coronary artery disease which is not amenable to revascularization (either PCI or bypass surgery - transmural scar in distal LAD/left circumflex/PLB noted on PET scan done in October, 2024).

## 2025-03-01 NOTE — ASSESSMENT & PLAN NOTE
Anemia is likely due to chronic disease due to ESRD. Most recent hemoglobin and hematocrit are listed below.  Recent Labs     02/28/25  1214 03/01/25  0535   HGB 9.2* 9.5*   HCT 28.9* 30.1*       Plan  - Monitor serial CBC: Daily  - Transfuse PRBC if patient becomes hemodynamically unstable, symptomatic or H/H drops below 7/21.  - Patient has not received any PRBC transfusions to date  - Patient's anemia is currently stable

## 2025-03-01 NOTE — H&P
[facility] - Emergency Dept  [facility]  History & Physical    Patient Name: Haroldo Dickinson  MRN: [id number]  Patient Class: OP- Observation  Admission Date: 2/28/2025  Attending Physician: Chon Sykes DO   Primary Care Provider: Mireya Larose MD         Patient information was obtained from patient, past medical records, and ER records.     Subjective:     Principal Problem:Chest pain    Chief Complaint:   Chief Complaint   Patient presents with    Chest Pain     Chest pain for multiple days, was at dialysis received approx [id number] of tx, received 325mg asa and 1 sl nitro with EMS        HPI: Haroldo Dickinson is a 49 y.o. male with a PMHx of previous CVA, ACS, CAD, NSTEMI s/p PTCA on Plavix, ESRD on dialysis (MWF), HTN, and renal cell carcinoma s/p nephrectomy who presents to the ED with left-sided 8/10 sharp chest pain radiating to the left arm that occurred while at hemodialysis at 10 AM today. Denies associated SOB, N/V, or diaphoresis. Has has similar pain in the past and states his pain typically starts as a sharp pain in the left arm and eventually spreads to his left lower chest. The patient was given and nitroglycerin 0.4 mg SL X 1 at the HD center. The patient was then given  mg PO X 1 per EMS, which helped the pain subside. The patient then had another episode of 8/10 left-sided chest pain radiating to the left arm that occurred while in the ED which was relieved by nitroglycerin 0.4 mg SL X 1. The patient states they have not had any subsequent episodes of chest pain since the second dose of nitroglycerin. The patient takes 2 nitroglycerin a week when he has sharp left arm pain, which typically relieves his pain before it spreads to his left chest. Also notes tylenol helps as well. The patient reports being fatigued. He denies fever, chills, headache, dyspnea, peripheral edema, vomiting, diarrhea, constipation, or numbness/tingling of the extremities.     In the ED, VSS, afebrile. CBC with  stable anemia. Bicarb 21. Cr 6.0 (consistent with ESRD). Glucose 139. Albumin 3.0. BNP 1,072. HS troponin 349>>333>>339. EKG shows SR with LVH, 89 bpm, no ST elevation or depression. CXR with cardiomegaly and perihilar edema in lungs. The patient received sl nitro.    Past Medical History:   Diagnosis Date    CAD (coronary artery disease), native coronary artery 11/21/2019    Cataract     Diabetes mellitus     Diabetic retinopathy     Dialysis patient     DM type 2 causing renal disease, not at goal     ESRD (end stage renal disease) started dialysis 01/2014 06/05/2014    H/o acute coronary syndrome with high troponin 03/11/2024    History of colon polyps 02/10/2021    History of COVID-19 12/29/2022    Hyperparathyroidism, secondary renal 06/05/2014    Hypertension     Hypertensive emergency 11/27/2023    NSTEMI (non-ST elevated myocardial infarction) 12/21/2013    Organ transplant candidate 06/05/2014    Palliative care encounter 10/29/2024    Pleural effusion 06/07/2024    Pneumonia     Post PTCA 08/26/2020    RCA HILLARY 7-25-20    Renal cell carcinoma of right kidney     Renal hypertension     Stroke        Past Surgical History:   Procedure Laterality Date    ANGIOGRAM, CORONARY, WITH LEFT HEART CATHETERIZATION N/A 7/1/2021    Procedure: Angiogram, Coronary, with Left Heart Cath;  Surgeon: Miki Zendejas MD;  Location: [facility] CATH LAB;  Service: Cardiology;  Laterality: N/A;    ANGIOGRAM, CORONARY, WITH LEFT HEART CATHETERIZATION N/A 11/28/2023    Procedure: Angiogram, Coronary, with Left Heart Cath;  Surgeon: Noah Pendleton MD;  Location: [facility] CATH LAB;  Service: Cardiology;  Laterality: N/A;    COLONOSCOPY N/A 5/3/2017    Procedure: COLONOSCOPY;  Surgeon: Rufino Carpenter MD;  Location: [facility] ENDO (4TH FLR);  Service: Endoscopy;  Laterality: N/A;  pt states can only schedule on Wednesdays    COLONOSCOPY N/A 2/9/2021    Procedure: COLONOSCOPY;  Surgeon: Edil Wong MD;  Location: [facility] ENDO (4TH FLR);  Service:  Endoscopy;  Laterality: N/A;  Dialysis MWF/ labwork day of procedure  right arm aceess  per Dr. Colin pt can hold Plavix 5 days prior see note- sm  COVID test on 2/6/21 at [facility] -     COLONOSCOPY N/A 2/10/2021    Procedure: COLONOSCOPY;  Surgeon: Robert Ayers MD;  Location: [facility] F[facility]);  Service: Endoscopy;  Laterality: N/A;  rescheduled due to poor bowel prep-BB  negative covid screening 2/6/21-BB  dialysis M-W-F-BB  okay to r/s for 2/9/21 and to hold Plavix per Dr. KIRAN Wong-BB  labs same day-BB    CORONARY ANGIOGRAPHY N/A 2/10/2025    Procedure: ANGIOGRAM, CORONARY ARTERY;  Surgeon: Ajith Hutchins MD;  Location: [facility] CATH LAB;  Service: Cardiology;  Laterality: N/A;    CORONARY STENT PLACEMENT N/A 7/25/2019    Procedure: INSERTION, STENT, CORONARY ARTERY;  Surgeon: Miki Zendejas MD;  Location: [facility] CATH LAB;  Service: Cardiology;  Laterality: N/A;    DIALYSIS FISTULA CREATION      FISTULOGRAM N/A 2/18/2019    Procedure: Fistulogram;  Surgeon: Yaneth De La Cruz MD;  Location: [facility] CATH LAB;  Service: Cardiology;  Laterality: N/A;    FISTULOGRAM Right 7/23/2019    Procedure: Fistulogram;  Surgeon: Oz Cordoba MD;  Location: [facility] CATH LAB;  Service: Cardiology;  Laterality: Right;    LAPAROSCOPIC ROBOT-ASSISTED SURGICAL REMOVAL OF KIDNEY USING DA JAIME XI Right 5/19/2022    Procedure: XI ROBOTIC NEPHRECTOMY;  Surgeon: Aidan Hendricks MD;  Location: [address];  Service: Urology;  Laterality: Right;  3hrs    LAPAROSCOPIC ROBOT-ASSISTED SURGICAL REMOVAL OF KIDNEY USING DA JAIME XI Left 1/5/2023    Procedure: XI ROBOTIC NEPHRECTOMY;  Surgeon: Aidan Hendricks MD;  Location: [facility];  Service: Urology;  Laterality: Left;  4 hrs    LEFT HEART CATHETERIZATION Left 7/25/2019    Procedure: Left heart cath;  Surgeon: Miki Zendejas MD;  Location: [facility] CATH LAB;  Service: Cardiology;  Laterality: Left;    LEFT HEART CATHETERIZATION Left 2/10/2025    Procedure: Left heart  cath;  Surgeon: Ajith Hutchins MD;  Location: [facility] CATH LAB;  Service: Cardiology;  Laterality: Left;    REPAIR  1/5/2023    Procedure: REPAIR; COLOTOMY;  Surgeon: Maikel Lamb MD;  Location: [facility] OR [address] [place] [address];  Service: General;;    RETINAL LASER PROCEDURE Bilateral 2018 or 2017    Dr. Rothman    VASCULAR SURGERY         Review of patient's allergies indicates:   Allergen Reactions    No known allergies        Current Facility-Administered Medications on File Prior to Encounter   Medication    lidocaine (PF) 10 mg/ml (1%) injection 10 mg     Current Outpatient Medications on File Prior to Encounter   Medication Sig    aspirin (ECOTRIN) 81 MG EC tablet Take 1 tablet (81 mg total) by mouth once daily.    atorvastatin (LIPITOR) 80 MG tablet Take 1 tablet (80 mg total) by mouth once daily.    carvediloL (COREG) 25 MG tablet Take 1 tablet (25 mg total) by mouth 2 (two) times daily with meals.    clopidogreL (PLAVIX) 75 mg tablet Take 1 tablet (75 mg total) by mouth once daily.    epoetin philip (PROCRIT) 4,000 unit/mL injection Inject 1.93 mLs (7,720 Units total) into the skin every Mon, Wed, Fri.    ezetimibe (ZETIA) 10 mg tablet Take 1 tablet (10 mg total) by mouth once daily.    hydrALAZINE (APRESOLINE) 25 MG tablet Take 1 tablet (25 mg total) by mouth every 8 (eight) hours.    isosorbide mononitrate (IMDUR) 120 MG 24 hr tablet Take 1 tablet (120 mg total) by mouth once daily.    methoxy peg-epoetin beta (MIRCERA INJ) 150 mcg.    minoxidiL (LONITEN) 10 MG Tab Take 1 tablet (10 mg total) by mouth 2 (two) times daily.    NIFEdipine (PROCARDIA-XL) 90 MG (OSM) 24 hr tablet Take 1 tablet (90 mg total) by mouth once daily.    nitroGLYCERIN (NITROSTAT) 0.4 MG SL tablet Place 1 tablet (0.4 mg total) under the tongue every 5 (five) minutes as needed for Chest pain.    ranolazine (RANEXA) 500 MG Tb12 Take 1 tablet (500 mg total) by mouth Daily.    sevelamer carbonate (RENVELA) 800 mg Tab Take 2 tablets (1,600 mg total)  by mouth 3 (three) times daily with meals.    tenapanor (XPHOZAH) 20 mg Tab Take 1 tablet by mouth.     Family History       Problem Relation (Age of Onset)    Cancer Maternal Grandfather    Cataracts Mother    Diabetes Mother    Heart disease Brother    Hypertension Mother, Father, Sister, Brother    Kidney disease Brother          Tobacco Use    Smoking status: Never    Smokeless tobacco: Never   Substance and Sexual Activity    Alcohol use: Not Currently     Comment: One drink a month    Drug use: No    Sexual activity: Yes     Partners: Female     Birth control/protection: None     Review of Systems   Constitutional:  Positive for fatigue. Negative for appetite change, chills, diaphoresis and fever.   HENT:  Negative for congestion, rhinorrhea and sore throat.    Eyes:  Negative for photophobia and visual disturbance.   Respiratory:  Negative for cough, shortness of breath and wheezing.    Cardiovascular:  Positive for chest pain. Negative for palpitations and leg swelling.   Gastrointestinal:  Negative for abdominal distention, abdominal pain, diarrhea, nausea and vomiting.   Genitourinary:  Positive for decreased urine volume (ESRD on HD; anuric).   Musculoskeletal:  Positive for myalgias. Negative for arthralgias, gait problem, joint swelling and neck pain.   Skin:  Negative for color change, pallor and rash.   Neurological:  Negative for dizziness, syncope, weakness, light-headedness, numbness and headaches.   Psychiatric/Behavioral:  Negative for confusion and hallucinations. The patient is not nervous/anxious.      Objective:     Vital Signs (Most Recent):  Temp: 98.2 °F (36.8 °C) (02/28/25 1319)  Pulse: 93 (02/28/25 1900)  Resp: 20 (02/28/25 1900)  BP: 101/62 (02/28/25 1900)  SpO2: 96 % (02/28/25 1900) Vital Signs (24h Range):  Temp:  [98.1 °F (36.7 °C)-98.2 °F (36.8 °C)] 98.2 °F (36.8 °C)  Pulse:  [80-97] 93  Resp:  [17-20] 20  SpO2:  [95 %-98 %] 96 %  BP: (101-118)/(55-73) 101/62     Weight: 71.5 kg  (157 lb 10.1 oz)  Body mass index is 24.69 kg/m².     Physical Exam  Vitals and nursing note reviewed.   Constitutional:       General: He is not in acute distress.     Appearance: He is not ill-appearing, toxic-appearing or diaphoretic.   HENT:      Head: Normocephalic and atraumatic.      Nose: Nose normal.      Mouth/Throat:      Mouth: Mucous membranes are moist.   Eyes:      Pupils: Pupils are equal, round, and reactive to light.   Cardiovascular:      Rate and Rhythm: Normal rate and regular rhythm.      Pulses: Normal pulses.      Heart sounds: Murmur heard.      Arteriovenous access: Right arteriovenous access is present.     Comments: +thrill  Pulmonary:      Effort: Pulmonary effort is normal. No respiratory distress.      Breath sounds: No wheezing, rhonchi or rales.   Chest:      Chest wall: No tenderness.   Abdominal:      General: Bowel sounds are normal. There is no distension.      Palpations: Abdomen is soft.      Tenderness: There is no abdominal tenderness. There is no guarding.   Musculoskeletal:         General: Normal range of motion.      Cervical back: Normal range of motion.      Right lower leg: No edema.      Left lower leg: No edema.   Skin:     General: Skin is warm and dry.      Capillary Refill: Capillary refill takes less than 2 seconds.   Neurological:      General: No focal deficit present.      Mental Status: He is alert and oriented to person, place, and time.      Sensory: No sensory deficit.      Motor: No weakness.   Psychiatric:         Mood and Affect: Mood normal.         Behavior: Behavior normal.              CRANIAL NERVES     CN III, IV, VI   Pupils are equal, round, and reactive to light.       Significant Labs: All pertinent labs within the past 24 hours have been reviewed.  CBC:   Recent Labs   Lab 02/28/25  1214   WBC 9.73   HGB 9.2*   HCT 28.9*        CMP:   Recent Labs   Lab 02/28/25  1214      K 4.0      CO2 21*   *   BUN 38*   CREATININE  6.0*   CALCIUM 8.7   PROT 7.6   ALBUMIN 3.0*   BILITOT 0.5   ALKPHOS 88   AST 17   ALT 9*   ANIONGAP 11     Cardiac Markers:   Recent Labs   Lab 02/28/25  1214   BNP 1,072*     Troponin:   Recent Labs   Lab 02/28/25  1214 02/28/25  1327 02/28/25  1512   TROPONINIHS 349* 333* 339*       Significant Imaging: I have reviewed all pertinent imaging results/findings within the past 24 hours.  Imaging Results              X-Ray Chest AP Portable (Final result)  Result time 02/28/25 13:45:25      Final result by Wilberto Cornejo MD (02/28/25 13:45:25)                   Impression:      Cardiomegaly and other findings likely representing CHF with perihilar edema.      Electronically signed by: Wilberto Cornejo MD  Date:    02/28/2025  Time:    13:45               Narrative:    EXAMINATION:  XR CHEST AP PORTABLE    CLINICAL HISTORY:  Chest pain, unspecified    TECHNIQUE:  Single frontal view of the chest was performed.    COMPARISON:  02/09/2025    FINDINGS:  The heart size is mildly enlarged.  Mediastinal structures are midline with note of aortic atherosclerosis.  Lungs are expanded with mildly increased pulmonary vascularity.  Faint ground-glass opacity is present in the central regions of both lungs with bilateral pattern likely indicating edema.  Multifocal pneumonia is also possible.  No significant pleural fluid is seen.  Skeletal structures are intact.  Monitor wires overlie the chest.                                      Assessment/Plan:     * Chest pain  - Acute, patient reports chest pain is resolved. Described as left lower chest wall pain that is sharp in nature. No associated SOB, N/V, or diaphoresis. Concern for MSK component. Will give tylenol and gabapentin.  - EKG without ST-segment elevation or depression, HR 89  - Troponin 349>>333>>339 (chronically elevated per chart review)  - BNP 1,072  - CXR with cardiomegaly and perihilar edema  - Continue ASA, statin, plavix, BB, imdur, and ranexa.  - Cardiac  monitoring.  - PRN EKG and sl nitro for chest pain  - Recently admitted at [facility] with CP and underwent LHC (see below). No culprit lesion was identified. Cardiology recommended maximal medical therapy given extensive multivessel coronary artery disease which is not amenable to revascularization (either PCI or bypass surgery - transmural scar in distal LAD/left circumflex/PLB noted on PET scan done in October, 2024).    -ECHO 2/10/25:    Left Ventricle: The left ventricle is mildly dilated. Mildly increased wall thickness. There is mild concentric hypertrophy. There is low normal systolic function with a visually estimated ejection fraction of 50 - 55%. Grade II diastolic dysfunction.    Right Ventricle: Normal right ventricular cavity size. Systolic function is normal.    Left Atrium: Left atrium is mildly dilated.    Aortic Valve: The aortic valve is a trileaflet valve. There is moderate aortic valve sclerosis.    Mitral Valve: There is mild to moderate regurgitation.    Tricuspid Valve: There is mild regurgitation.    -Coronary angiography 2/10/25:     Severe multivessel coronary artery disease is present    Distal left main had 30-40% calcific stenosis    Proximal LAD had 70-80% calcific stenosis and 100% stenosis in mid to distal portion.  Distal LAD was getting filled via septal collaterals    2nd diagonal artery had 90% stenosis in midportion    Mid left circumflex artery had 40% calcific stenosis.  Stenosis severity can not be determined accurately due to presence of eccentric calcification    Mid RCA had 90% InStent restenosis followed by chronic total occlusion.  Distal RCA was getting filled via right to right collaterals and left-to-right collaterals    Right common femoral arterial access was closed with a Perclose device    Low normal LVEDP (7 mmHg)    Coronary artery disease of native artery with stable angina pectoris  Patient with known CAD s/p stent placement, which is controlled Will continue  "ASA, Plavix, and Statin and monitor for S/Sx of angina/ACS. Continue to monitor on telemetry.     Chronic diastolic heart failure  Patient is identified as having Diastolic (HFpEF) heart failure that is Acute on Chronic. CHF is currently uncontrolled due to Pulmonary edema/pleural effusion on CXR. Latest ECHO performed and demonstrates- Results for orders placed during the hospital encounter of 02/09/25    Echo    Interpretation Summary    Left Ventricle: The left ventricle is mildly dilated. Mildly increased wall thickness. There is mild concentric hypertrophy. There is low normal systolic function with a visually estimated ejection fraction of 50 - 55%. Grade II diastolic dysfunction.    Right Ventricle: Normal right ventricular cavity size. Systolic function is normal.    Left Atrium: Left atrium is mildly dilated.    Aortic Valve: The aortic valve is a trileaflet valve. There is moderate aortic valve sclerosis.    Mitral Valve: There is mild to moderate regurgitation.    Tricuspid Valve: There is mild regurgitation.  . Continue Beta Blocker Nitrate/Vasodilator and monitor clinical status closely. Monitor on telemetry. Patient is off CHF pathway.  Monitor strict Is&Os and daily weights.  Place on fluid restriction of 1.5 L. Continue to stress to patient importance of self efficacy and  on diet for CHF. Last BNP reviewed- and noted below   Recent Labs   Lab 02/28/25  1214   BNP 1,072*   -CXR with cardiomegaly and pulmonary edema.  -No peripheral edema on exam.  -Volume management per HD.    Type 2 diabetes mellitus with chronic kidney disease on chronic dialysis, without long-term current use of insulin  Patient's FSGs are controlled with diet alone.   Last A1c reviewed-   Lab Results   Component Value Date    HGBA1C 5.1 01/08/2025     Most recent fingerstick glucose reviewed- No results for input(s): "POCTGLUCOSE" in the last 24 hours.  Current correctional scale  Low  Maintain anti-hyperglycemic dose as " follows-   Antihyperglycemics (From admission, onward)      None        - Deferring SSI & CBGs given euglycemia   - Hypoglycemic protocol     PAD (peripheral artery disease)  -History noted.  -Continue ASA, statin, and plavix.    Chronic kidney disease-mineral and bone disorder  -History noted.  -Continue renvela.    Anemia in end-stage renal disease  Anemia is likely due to chronic disease due to ESRD. Most recent hemoglobin and hematocrit are listed below.  Recent Labs     02/26/25  0000 02/28/25  1214   HGB 9.2* 9.2*   HCT  --  28.9*     Plan  - Monitor serial CBC: Daily  - Transfuse PRBC if patient becomes hemodynamically unstable, symptomatic or H/H drops below 7/21.  - Patient has not received any PRBC transfusions to date  - Patient's anemia is currently stable    Essential hypertension  Patient's blood pressure range in the last 24 hours was: BP  Min: 101/59  Max: 118/70.The patient's inpatient anti-hypertensive regimen is listed below:  Current Antihypertensives  nitroGLYCERIN SL tablet 0.4 mg, Every 5 min PRN, Sublingual  carvediloL tablet 25 mg, 2 times daily with meals, Oral  hydrALAZINE tablet 25 mg, Every 8 hours, Oral  isosorbide mononitrate 24 hr tablet 120 mg, Daily, Oral  NIFEdipine 24 hr tablet 90 mg, Daily, Oral  ranolazine 12 hr tablet 500 mg, Daily, Oral    Plan  - BP is controlled, no changes needed to their regimen    ESRD on hemodialysis  MWF schedule, dialyzed today but stopped after 1hr and 45min due to CP  Residual renal function?- No    - Nephrology consulted  - Continue chronic hemodialysis  - Monitor daily electrolytes and defer dialysis orders to nephrology  - Renally dose medications  - Renal diet  - Continue phosphorus binders  - Daily weights/strict I&Os  - 1.5L FR     Hemiparesis affecting right side as late effect of cerebrovascular accident  -History noted.  -Continue ASA, statin, and plavix.  -Fall precautions.      VTE Risk Mitigation (From admission, onward)           Ordered      heparin (porcine) injection 5,000 Units  Every 8 hours         02/28/25 1906     IP VTE HIGH RISK PATIENT  Once         02/28/25 1906     Place sequential compression device  Until discontinued         02/28/25 1906                         On 02/28/2025, patient should be placed in hospital observation services under my care in collaboration with Anthony De La Cruz MD.           Kim Liu NP  Department of Hospital Medicine  [facility] - Emergency Dept

## 2025-03-01 NOTE — MEDICAL/APP STUDENT
History     Chief Complaint   Patient presents with    Chest Pain     Chest pain for multiple days, was at dialysis received approx [id number] of tx, received 325mg asa and 1 sl nitro with EMS     49-year-old male with PMHx of NSTEMI s/p PTCA, ESRD on dialysis, renal cell carcinoma s/p nephrectomy, ACS, CAD, previous stroke 10 years ago, and HTN presents to the ED with left-sided 8/10 sharp chest pain radiating to the left arm that occurred while at hemodialysis at 10 AM today. The patient states his pain typically starts as a sharp pain in the left arm and eventually spreads to his left lower chest. The patient was given and nitroglycerin 0.4 mg SL X 1 at the hemodialysis center. The patient was then given  mg PO X 1 while in the EMS, which helped the pain subside. The patient then had another episode of 8/10 left-sided chest pain radiating to the left arm that occurred while in the ED which was relieved by nitroglycerin 0.4 mg SL X 1. The patient states they have not had any subsequent episodes of chest pain since the second dose of nitroglycerin. The patient takes 1-2 nitroglycerin a week in response to the sharp left arm pain, which typically relieves his pain before it spreads to his left chest. The patient reports this happening one other time in hemodialysis presenting as sharp left arm pain, which was relieved when the nurse had given him oxygen and nitroglycerin 0.4 mg SL. The patient vaguely recognizes the isosorbide dinitrate (imdur), and he is unclear if he is currently on this medication. The patient reports being fatigued. The patient denies fever, chills, headache, dyspnea, peripheral edema, vomiting, diarrhea, constipation, or numbness/tingling of the extremities. The patient is on hemodialysis and does not produce urine.    ED course: The patient was given nitroglycerin 0.4 mg SL X 1 in response to refractory left sided chest pain radiating to the left arm, troponin I 339 with a normal  delta troponin I per 3 subsequent troponin measurments, CXR shows cardiomegaly and perihilar edema in lungs, Hgb 9.2 with PMHx of anemia of chronic disease    Chest Pain   Pertinent negatives include no fever, headaches, nausea, numbness, shortness of breath or vomiting.       Past Medical History:   Diagnosis Date    CAD (coronary artery disease), native coronary artery 11/21/2019    Cataract     Diabetes mellitus     Diabetic retinopathy     Dialysis patient     DM type 2 causing renal disease, not at goal     ESRD (end stage renal disease) started dialysis 01/2014 06/05/2014    H/o acute coronary syndrome with high troponin 03/11/2024    History of colon polyps 02/10/2021    History of COVID-19 12/29/2022    Hyperparathyroidism, secondary renal 06/05/2014    Hypertension     Hypertensive emergency 11/27/2023    NSTEMI (non-ST elevated myocardial infarction) 12/21/2013    Organ transplant candidate 06/05/2014    Palliative care encounter 10/29/2024    Pleural effusion 06/07/2024    Pneumonia     Post PTCA 08/26/2020    [facility] 7-25-20    Renal cell carcinoma of right kidney     Renal hypertension     Stroke        Past Surgical History:   Procedure Laterality Date    ANGIOGRAM, CORONARY, WITH LEFT HEART CATHETERIZATION N/A 7/1/2021    Procedure: Angiogram, Coronary, with Left Heart Cath;  Surgeon: Miki Zendejas MD;  Location: [facility] CATH LAB;  Service: Cardiology;  Laterality: N/A;    ANGIOGRAM, CORONARY, WITH LEFT HEART CATHETERIZATION N/A 11/28/2023    Procedure: Angiogram, Coronary, with Left Heart Cath;  Surgeon: Noah Pendleton MD;  Location: [facility] CATH LAB;  Service: Cardiology;  Laterality: N/A;    COLONOSCOPY N/A 5/3/2017    Procedure: COLONOSCOPY;  Surgeon: Rufino Carpenter MD;  [facility]  Service: Endoscopy;  Laterality: N/A;   states can only schedule on Wednesdays    COLONOSCOPY N/A 2/9/2021    Procedure: COLONOSCOPY;  Surgeon: Edil Wong MD;   Location: [facility] ENDO (4TH FLR);  Service: Endoscopy;  Laterality: N/A;  Dialysis MWF/ labwork day of procedure  right arm aceess  per Dr. Colin pt can hold Plavix 5 days prior see note- sm  COVID test on 2/6/21 at [facility] -     COLONOSCOPY N/A 2/10/2021    Procedure: COLONOSCOPY;  Surgeon: Robert Ayers MD;  Location: [facility] ENDO (4TH FLR);  Service: Endoscopy;  Laterality: N/A;  rescheduled due to poor bowel prep-BB  negative covid screening 2/6/21-BB  dialysis M-W-F-BB  okay to r/s for 2/9/21 and to hold Plavix per Dr. KIRAN Wong-BB  labs same day-BB    CORONARY ANGIOGRAPHY N/A 2/10/2025    Procedure: ANGIOGRAM, CORONARY ARTERY;  Surgeon: Ajith Hutchins MD;  Location: [facility] CATH LAB;  Service: Cardiology;  Laterality: N/A;    CORONARY STENT PLACEMENT N/A 7/25/2019    Procedure: INSERTION, STENT, CORONARY ARTERY;  Surgeon: Miki Zendejas MD;  Location: [facility] CATH LAB;  Service: Cardiology;  Laterality: N/A;    DIALYSIS FISTULA CREATION      FISTULOGRAM N/A 2/18/2019    Procedure: Fistulogram;  Surgeon: Yaneth De La Cruz MD;  Location: [facility] CATH LAB;  Service: Cardiology;  Laterality: N/A;    FISTULOGRAM Right 7/23/2019    Procedure: Fistulogram;  Surgeon: Oz Cordoba MD;  Location: [facility] CATH LAB;  Service: Cardiology;  Laterality: Right;    LAPAROSCOPIC ROBOT-ASSISTED SURGICAL REMOVAL OF KIDNEY USING DA JAIME XI Right 5/19/2022    Procedure: XI ROBOTIC NEPHRECTOMY;  Surgeon: Aidan Hendricks MD;  Location: [address];  Service: Urology;  Laterality: Right;  3hrs    LAPAROSCOPIC ROBOT-ASSISTED SURGICAL REMOVAL OF KIDNEY USING DA JAIME XI Left 1/5/2023    Procedure: XI ROBOTIC NEPHRECTOMY;  Surgeon: Aidan Hendricks MD;  Location: [address];  Service: Urology;  Laterality: Left;  4 hrs    LEFT HEART CATHETERIZATION Left 7/25/2019    Procedure: Left heart cath;  Surgeon: Miki Zendejas MD;  Location: [facility] CATH LAB;  Service: Cardiology;  Laterality: Left;    LEFT HEART  "CATHETERIZATION Left 2/10/2025    Procedure: Left heart cath;  Surgeon: Ajith Hutchins MD;  Location: [facility] CATH LAB;  Service: Cardiology;  Laterality: Left;    REPAIR  1/5/2023    Procedure: REPAIR; COLOTOMY;  Surgeon: Maikel Lamb MD;  Location: [facility] OR [address] [place] [address];  Service: General;;    RETINAL LASER PROCEDURE Bilateral 2018 or 2017    Dr. Rothman    VASCULAR SURGERY         Family History   Problem Relation Name Age of Onset    Hypertension Mother      Diabetes Mother      Cataracts Mother      Hypertension Father      Hypertension Sister      Kidney disease Brother      Hypertension Brother      Heart disease Brother      Cancer Maternal Grandfather          Colon CA    Colon cancer Neg Hx      Esophageal cancer Neg Hx      Stomach cancer Neg Hx      Rectal cancer Neg Hx      Ulcerative colitis Neg Hx      Irritable bowel syndrome Neg Hx      Crohn's disease Neg Hx      Celiac disease Neg Hx      Glaucoma Neg Hx      Macular degeneration Neg Hx         Social History[1]    Review of Systems   Constitutional:  Positive for fatigue. Negative for chills and fever.   Respiratory:  Negative for shortness of breath.    Cardiovascular:  Positive for chest pain (Left sided 8/10 and radiates to the L arm). Negative for leg swelling.   Gastrointestinal:  Negative for constipation, diarrhea, nausea and vomiting.   Neurological:  Negative for numbness and headaches.       Physical Exam   /62 (BP Location: Left arm, Patient Position: Lying)   Pulse 93   Temp 98.2 °F (36.8 °C) (Oral)   Resp 20   Ht 5' 7" (1.702 m)   Wt 71.5 kg (157 lb 10.1 oz)   SpO2 96%   BMI 24.69 kg/m²     Physical Exam    Constitutional: He appears well-developed and well-nourished.   HENT:   Head: Normocephalic and atraumatic.   Eyes: Conjunctivae and EOM are normal. Pupils are equal, round, and reactive to light.   Neck: No JVD present.   Cardiovascular:  Regular rhythm and normal heart sounds.   Tachycardia " present.         Pulmonary/Chest: Breath sounds normal.     Skin: Skin is warm and dry.         ED Course                  [1]  Social History  Tobacco Use    Smoking status: Never    Smokeless tobacco: Never   Substance Use Topics    Alcohol use: Not Currently     Comment: One drink a month    Drug use: No

## 2025-03-01 NOTE — CONSULTS
[facility]  Cardiology  Consult Note    Patient Name: Haroldo Dickinson  MRN: [id number]  Admission Date: 2/28/2025  Hospital Length of Stay: 0 days  Code Status: Full Code   Attending Provider: Anjana Maharaj MD   Consulting Provider: Jesse Tay MD  Primary Care Physician: Mireya Larose MD  Principal Problem:Chest pain    Patient information was obtained from patient, past medical records, and ER records.     Inpatient consult to Cardiology  Consult performed by: Jesse Tay MD  Consult ordered by: Anjana Maharaj MD        Subjective:     Chief Complaint:  Chest pain     HPI:   As per chart review: 49 y.o. male with a PMHx of previous CVA, ACS, CAD, NSTEMI s/p PTCA on Plavix, ESRD on dialysis (MWF), HTN, and renal cell carcinoma s/p nephrectomy who presents to the ED with left-sided 8/10 sharp chest pain radiating to the left arm that occurred while at hemodialysis at 10 AM today. Denies associated SOB, N/V, or diaphoresis. Has has similar pain in the past and states his pain typically starts as a sharp pain in the left arm and eventually spreads to his left lower chest. The patient was given and nitroglycerin 0.4 mg SL X 1 at the HD center. The patient was then given  mg PO X 1 per EMS, which helped the pain subside. The patient then had another episode of 8/10 left-sided chest pain radiating to the left arm that occurred while in the ED which was relieved by nitroglycerin 0.4 mg SL X 1. The patient states they have not had any subsequent episodes of chest pain since the second dose of nitroglycerin. The patient takes 2 nitroglycerin a week when he has sharp left arm pain, which typically relieves his pain before it spreads to his left chest. Also notes tylenol helps as well. The patient reports being fatigued. He denies fever, chills, headache, dyspnea, peripheral edema, vomiting, diarrhea, constipation, or numbness/tingling of the extremities.      In the ED, VSS, afebrile. CBC with  stable anemia. Bicarb 21. Cr 6.0 (consistent with ESRD). Glucose 139. Albumin 3.0. BNP 1,072. HS troponin 349>>333>>339. EKG shows SR with LVH, 89 bpm, no ST elevation or depression. CXR with cardiomegaly and perihilar edema in lungs. The patient received sl nitro.    Cardiology consulted for recurrent chest pain and management     Past Medical History:   Diagnosis Date    CAD (coronary artery disease), native coronary artery 11/21/2019    Cataract     Diabetes mellitus     Diabetic retinopathy     Dialysis patient     DM type 2 causing renal disease, not at goal     ESRD (end stage renal disease) started dialysis 01/2014 06/05/2014    H/o acute coronary syndrome with high troponin 03/11/2024    History of colon polyps 02/10/2021    History of COVID-19 12/29/2022    Hyperparathyroidism, secondary renal 06/05/2014    Hypertension     Hypertensive emergency 11/27/2023    NSTEMI (non-ST elevated myocardial infarction) 12/21/2013    Organ transplant candidate 06/05/2014    Palliative care encounter 10/29/2024    Pleural effusion 06/07/2024    Pneumonia     Post PTCA 08/26/2020    RCA HILLARY 7-25-20    Renal cell carcinoma of right kidney     Renal hypertension     Stroke        Past Surgical History:   Procedure Laterality Date    ANGIOGRAM, CORONARY, WITH LEFT HEART CATHETERIZATION N/A 7/1/2021    Procedure: Angiogram, Coronary, with Left Heart Cath;  Surgeon: Miki Zendejas MD;  Location: [facility] CATH LAB;  Service: Cardiology;  Laterality: N/A;    ANGIOGRAM, CORONARY, WITH LEFT HEART CATHETERIZATION N/A 11/28/2023    Procedure: Angiogram, Coronary, with Left Heart Cath;  Surgeon: Noah Pendleton MD;  Location: [facility] CATH LAB;  Service: Cardiology;  Laterality: N/A;    COLONOSCOPY N/A 5/3/2017    Procedure: COLONOSCOPY;  Surgeon: Rufino Carpenter MD;  Location: [facility] ENDO ([address] [place] [address]);  Service: Endoscopy;  Laterality: N/A;  pt states can only schedule on Wednesdays    COLONOSCOPY N/A 2/9/2021    Procedure: COLONOSCOPY;   Surgeon: Edil Wong MD;  Location: [facility] ENDO (4TH FLR);  Service: Endoscopy;  Laterality: N/A;  Dialysis MWF/ labwork day of procedure  right arm aceess  per Dr. Colin pt can hold Plavix 5 days prior see note- sm  COVID test on 2/6/21 at [facility] - sm    COLONOSCOPY N/A 2/10/2021    Procedure: COLONOSCOPY;  Surgeon: Robert Ayers MD;  Location: [facility] ENDO (4TH FLR);  Service: Endoscopy;  Laterality: N/A;  rescheduled due to poor bowel prep-BB  negative covid screening 2/6/21-BB  dialysis M-W-F-BB  okay to r/s for 2/9/21 and to hold Plavix per Dr. KIRAN Wong-BB  labs same day-BB    CORONARY ANGIOGRAPHY N/A 2/10/2025    Procedure: ANGIOGRAM, CORONARY ARTERY;  Surgeon: Ajith Hutchins MD;  Location: [facility] CATH LAB;  Service: Cardiology;  Laterality: N/A;    CORONARY STENT PLACEMENT N/A 7/25/2019    Procedure: INSERTION, STENT, CORONARY ARTERY;  Surgeon: Miki Zendejas MD;  Location: [facility] CATH LAB;  Service: Cardiology;  Laterality: N/A;    DIALYSIS FISTULA CREATION      FISTULOGRAM N/A 2/18/2019    Procedure: Fistulogram;  Surgeon: Yaneth De La Cruz MD;  Location: [facility] CATH LAB;  Service: Cardiology;  Laterality: N/A;    FISTULOGRAM Right 7/23/2019    Procedure: Fistulogram;  Surgeon: Oz Cordoba MD;  Location: [facility] CATH LAB;  Service: Cardiology;  Laterality: Right;    LAPAROSCOPIC ROBOT-ASSISTED SURGICAL REMOVAL OF KIDNEY USING DA JAIME XI Right 5/19/2022    Procedure: XI ROBOTIC NEPHRECTOMY;  Surgeon: Aidan Hendricks MD;  Location: [address];  Service: Urology;  Laterality: Right;  3hrs    LAPAROSCOPIC ROBOT-ASSISTED SURGICAL REMOVAL OF KIDNEY USING DA JAIME XI Left 1/5/2023    Procedure: XI ROBOTIC NEPHRECTOMY;  Surgeon: Aidan Hendricks MD;  Location: [facility] 2ND FLR;  Service: Urology;  Laterality: Left;  4 hrs    LEFT HEART CATHETERIZATION Left 7/25/2019    Procedure: Left heart cath;  Surgeon: Miki Zendejas MD;  Location: [facility] CATH LAB;  Service: Cardiology;  Laterality: Left;     LEFT HEART CATHETERIZATION Left 2/10/2025    Procedure: Left heart cath;  Surgeon: Ajith Hutchins MD;  Location: [facility] CATH LAB;  Service: Cardiology;  Laterality: Left;    REPAIR  1/5/2023    Procedure: REPAIR; COLOTOMY;  Surgeon: Maikel Lamb MD;  Location: [facility] OR [address] [place] [address];  Service: General;;    RETINAL LASER PROCEDURE Bilateral 2018 or 2017    Dr. Rothman    VASCULAR SURGERY         Review of patient's allergies indicates:   Allergen Reactions    No known allergies        Current Facility-Administered Medications on File Prior to Encounter   Medication    lidocaine (PF) 10 mg/ml (1%) injection 10 mg     Current Outpatient Medications on File Prior to Encounter   Medication Sig    aspirin (ECOTRIN) 81 MG EC tablet Take 1 tablet (81 mg total) by mouth once daily.    atorvastatin (LIPITOR) 80 MG tablet Take 1 tablet (80 mg total) by mouth once daily.    carvediloL (COREG) 25 MG tablet Take 1 tablet (25 mg total) by mouth 2 (two) times daily with meals.    clopidogreL (PLAVIX) 75 mg tablet Take 1 tablet (75 mg total) by mouth once daily.    epoetin philip (PROCRIT) 4,000 unit/mL injection Inject 1.93 mLs (7,720 Units total) into the skin every Mon, Wed, Fri.    ezetimibe (ZETIA) 10 mg tablet Take 1 tablet (10 mg total) by mouth once daily.    hydrALAZINE (APRESOLINE) 25 MG tablet Take 1 tablet (25 mg total) by mouth every 8 (eight) hours.    isosorbide mononitrate (IMDUR) 120 MG 24 hr tablet Take 1 tablet (120 mg total) by mouth once daily.    methoxy peg-epoetin beta (MIRCERA INJ) 150 mcg.    minoxidiL (LONITEN) 10 MG Tab Take 1 tablet (10 mg total) by mouth 2 (two) times daily.    NIFEdipine (PROCARDIA-XL) 90 MG (OSM) 24 hr tablet Take 1 tablet (90 mg total) by mouth once daily.    nitroGLYCERIN (NITROSTAT) 0.4 MG SL tablet Place 1 tablet (0.4 mg total) under the tongue every 5 (five) minutes as needed for Chest pain.    ranolazine (RANEXA) 500 MG Tb12 Take 1 tablet (500 mg total) by mouth Daily.     sevelamer carbonate (RENVELA) 800 mg Tab Take 2 tablets (1,600 mg total) by mouth 3 (three) times daily with meals.    tenapanor (XPHOZAH) 20 mg Tab Take 1 tablet by mouth.     Family History       Problem Relation (Age of Onset)    Cancer Maternal Grandfather    Cataracts Mother    Diabetes Mother    Heart disease Brother    Hypertension Mother, Father, Sister, Brother    Kidney disease Brother          Tobacco Use    Smoking status: Never    Smokeless tobacco: Never   Substance and Sexual Activity    Alcohol use: Not Currently     Comment: One drink a month    Drug use: No    Sexual activity: Yes     Partners: Female     Birth control/protection: None     Review of Systems   Constitutional: Negative.   Cardiovascular: Negative.    Respiratory: Negative.     Neurological: Negative.      Objective:     Vital Signs (Most Recent):  Temp: 97.9 °F (36.6 °C) (03/01/25 0752)  Pulse: 96 (03/01/25 1113)  Resp: 18 (03/01/25 0752)  BP: 130/73 (03/01/25 0752)  SpO2: 95 % (03/01/25 0752) Vital Signs (24h Range):  Temp:  [97.7 °F (36.5 °C)-98.2 °F (36.8 °C)] 97.9 °F (36.6 °C)  Pulse:  [] 96  Resp:  [16-20] 18  SpO2:  [93 %-99 %] 95 %  BP: (101-130)/(55-96) 130/73     Weight: 71.1 kg (156 lb 12 oz)  Body mass index is 24.55 kg/m².    SpO2: 95 %       No intake or output data in the 24 hours ending 03/01/25 1131    Lines/Drains/Airways       Peripheral Intravenous Line  Duration                  Hemodialysis AV Fistula Right forearm -- days         Peripheral IV - Single Lumen 02/28/25 1213 18 G Left Forearm <1 day                     Physical Exam   Constitutional:       General: He is not in acute distress.     Appearance: He is not ill-appearing, toxic-appearing or diaphoretic.   HENT:      Head: Normocephalic.   Eyes:      General: No scleral icterus.     Pupils: Pupils are equal, round, and reactive to light.   Cardiovascular:      Rate and Rhythm: Regular rhythm. Tachycardia present.      Heart sounds: Murmur heard.       Arteriovenous access: Right arteriovenous access is present.     Comments: +thrill  Pulmonary:      Effort: No respiratory distress.      Breath sounds: Normal breath sounds. No wheezing, rhonchi or rales.      Significant Labs:   Recent Labs   Lab 02/26/25  0000 02/28/25  1214 03/01/25  0535   WBC  --  9.73 12.83*   HGB 9.2* 9.2* 9.5*   HCT  --  28.9* 30.1*   PLT  --  387 435       Recent Labs   Lab 02/24/25  0000 02/28/25  1214 03/01/25  0535   NA  --  136 136   K  --  4.0 4.2   CL  --  104 103   CO2  --  21* 20*   BUN 76* 38* 49*   CREATININE  --  6.0* 8.3*   CALCIUM  --  8.7 9.3   PHOS  --   --  5.1*       Recent Labs   Lab 02/28/25  1214 03/01/25  0535   ALKPHOS 88 91   BILITOT 0.5 0.6   PROT 7.6 7.8   ALT 9* 8*   AST 17 16       Lab Results   Component Value Date    CHOL 110 (L) 02/10/2025    HDL 58 02/10/2025    LDLCALC 42.8 (L) 02/10/2025    TRIG 46 02/10/2025       Lab Results   Component Value Date    HGBA1C 5.1 01/08/2025    HGBA1C 4.7 03/11/2024       Lab Results   Component Value Date    BNP 1,072 (H) 02/28/2025     (H) 02/09/2025    BNP 1,322 (H) 12/27/2024       Significant Imaging:   -ECHO 2/10/25:    Left Ventricle: The left ventricle is mildly dilated. Mildly increased wall thickness. There is mild concentric hypertrophy. There is low normal systolic function with a visually estimated ejection fraction of 50 - 55%. Grade II diastolic dysfunction.    Right Ventricle: Normal right ventricular cavity size. Systolic function is normal.    Left Atrium: Left atrium is mildly dilated.    Aortic Valve: The aortic valve is a trileaflet valve. There is moderate aortic valve sclerosis.    Mitral Valve: There is mild to moderate regurgitation.    Tricuspid Valve: There is mild regurgitation.     -Coronary angiography 2/10/25:     Severe multivessel coronary artery disease is present    Distal left main had 30-40% calcific stenosis    Proximal LAD had 70-80% calcific stenosis and 100% stenosis in mid  to distal portion.  Distal LAD was getting filled via septal collaterals    2nd diagonal artery had 90% stenosis in midportion    Mid left circumflex artery had 40% calcific stenosis.  Stenosis severity can not be determined accurately due to presence of eccentric calcification    Mid RCA had 90% InStent restenosis followed by chronic total occlusion.  Distal RCA was getting filled via right to right collaterals and left-to-right collaterals    Right common femoral arterial access was closed with a Perclose device    Low normal LVEDP (7 mmHg)  Assessment and Plan:     Coronary artery disease of native artery with stable angina pectoris  Patient with known CAD s/p stent placement, which is uncontrolled Will continue ASA, Plavix, and Statin  - Pt with Hx of Mid RCA disease s/p PCI 7/25/19   - Presented with recurrent chest pain.   - Recently admitted at [facility] with CP and underwent LHC; medical therapy recommended   - ECHO 2/10/25 - EF 50-55%      C Cath 11/28/23     There was three vessel coronary artery disease.    The Mid LAD lesion was 90% stenosed.    The Mid RCA lesion was 100% stenosed.    The Prox LAD lesion was 75% stenosed.    The pre-procedure left ventricular end diastolic pressure was 26.    There was diastolic dysfunction.    Interval worsening of mid LAD disease. Given diffuse nature of CAD will recommend medcial therapy and HD optimization to improve left sided filling pressures    The estimated blood loss was <50 mL.     Cath 2/10/25:     Severe multivessel coronary artery disease is present    Distal left main had 30-40% calcific stenosis    Proximal LAD had 70-80% calcific stenosis and 100% stenosis in mid to distal portion.  Distal LAD was getting filled via septal collaterals    2nd diagonal artery had 90% stenosis in midportion    Mid left circumflex artery had 40% calcific stenosis.  Stenosis severity can not be determined accurately due to presence of eccentric calcification    Mid RCA  had 90% InStent restenosis followed by chronic total occlusion.  Distal RCA was getting filled via right to right collaterals and left-to-right collaterals    Right common femoral arterial access was closed with a Perclose device    Low normal LVEDP (7 mmHg)    Recommendation   - Give pt diffuse disease, and as per record from [facility] recommendations [facility]: No culprit lesion was identified on current study and given extensive multivessel coronary artery disease which is not amenable to revascularization (either PCI or bypass surgery - transmural scar in distal LAD/left circumflex/PLB noted on PET scan done in October, 2024)  recommended maximal medical therapy  - Continue ASA 81 mg qd   - Continue with Lipitor 80 mg   - Continue with Plavix  75 mg   - Continue with coreg 25 mg BID   - Continue with nitro sublingual prn   - Continue with ranolazine 500 mg qd   - Continue with Isosorbide mononitrate   - Pt is on adequate medical therapy.   - Follow up with cardiology clinic upon discharge for further management     Chronic diastolic heart failure  Lasted ECHO: 2/10/25  estimated ejection fraction of 50 - 55%. Grade II diastolic dysfunction.  - CXR notable for pulmonary edema and pleural effusion   - Pt with ESRD on HD.   - No peripheral edema on exam.    Recommendations   - Volume management per HD  - Continue with Hydralazine 25 mg q 8   - Recommend 2 gram sodium restriction and 1500cc fluid restriction.  - Encourage physical activity with graded exercise program.  - Monitor fluid intake         VTE Risk Mitigation (From admission, onward)           Ordered     heparin (porcine) injection 5,000 Units  Every 8 hours         02/28/25 1906     IP VTE HIGH RISK PATIENT  Once         02/28/25 1906     Place sequential compression device  Until discontinued         02/28/25 1906                    Thank you for your consult. I will sign off. Please contact us if you have any additional questions. Attending's attestation will  follow, please review it. Rest of the care as per primary team.     Jesse Tay MD  Cardiology   [facility] - Observation 11H

## 2025-03-01 NOTE — MEDICAL/APP STUDENT
History     Chief Complaint   Patient presents with    Chest Pain     Chest pain for multiple days, was at dialysis received approx [id number] of tx, received 325mg asa and 1 sl nitro with EMS     49-year-old male with PMHx of NSTEMI s/p PTCA, ESRD on dialysis, renal cell carcinoma s/p nephrectomy, ACS, CAD, previous stroke 10 years ago, and HTN presents to the ED with left-sided 8/10 sharp chest pain radiating to the left arm that occurred while at hemodialysis at 10 AM today. The patient states his pain typically starts as a sharp pain in the left arm and eventually spreads to his left lower chest. The patient was given and nitroglycerin 0.4 mg SL X 1 at the hemodialysis center. The patient was then given  mg PO X 1 while in the EMS, which helped the pain subside. The patient then had another episode of 8/10 left-sided chest pain radiating to the left arm that occurred while in the ED which was relieved by nitroglycerin 0.4 mg SL X 1. The patient states they have not had any subsequent episodes of chest pain since the second dose of nitroglycerin. The patient takes 1-2 nitroglycerin a week in response to the sharp left arm pain, which typically relieves his pain before it spreads to his left chest. The patient reports this happening one other time in hemodialysis presenting as sharp left arm pain, which was relieved when the nurse had given him oxygen and nitroglycerin 0.4 mg SL. The patient vaguely recognizes the isosorbide dinitrate (imdur), and he is unclear if he is currently on this medication. The patient reports being fatigued. The patient denies fever, chills, headache, dyspnea, peripheral edema, vomiting, diarrhea, constipation, or numbness/tingling of the extremities. The patient is on hemodialysis and does not produce urine.     ED course: The patient was given nitroglycerin 0.4 mg SL X 1 in response to refractory left sided chest pain radiating to the left arm, troponin I 339 with a normal  delta troponin I per 3 subsequent troponin measurments, CXR shows cardiomegaly and perihilar edema in lungs, Hgb 9.2 with PMHx of anemia of chronic disease    Chest Pain   Pertinent negatives include no fever, headaches, nausea, numbness, shortness of breath or vomiting.       Past Medical History:   Diagnosis Date    CAD (coronary artery disease), native coronary artery 11/21/2019    Cataract     Diabetes mellitus     Diabetic retinopathy     Dialysis patient     DM type 2 causing renal disease, not at goal     ESRD (end stage renal disease) started dialysis 01/2014 06/05/2014    H/o acute coronary syndrome with high troponin 03/11/2024    History of colon polyps 02/10/2021    History of COVID-19 12/29/2022    Hyperparathyroidism, secondary renal 06/05/2014    Hypertension     Hypertensive emergency 11/27/2023    NSTEMI (non-ST elevated myocardial infarction) 12/21/2013    Organ transplant candidate 06/05/2014    Palliative care encounter 10/29/2024    Pleural effusion 06/07/2024    Pneumonia     Post PTCA 08/26/2020    RCA [facility] 7-25-20    Renal cell carcinoma of right kidney     Renal hypertension     Stroke        Past Surgical History:   Procedure Laterality Date    ANGIOGRAM, CORONARY, WITH LEFT HEART CATHETERIZATION N/A 7/1/2021    Procedure: Angiogram, Coronary, with Left Heart Cath;  Surgeon: Miki Zendejas MD;  Location: [facility] CATH LAB;  Service: Cardiology;  Laterality: N/A;    ANGIOGRAM, CORONARY, WITH LEFT HEART CATHETERIZATION N/A 11/28/2023    Procedure: Angiogram, Coronary, with Left Heart Cath;  Surgeon: Noah Pendleton MD;  Location: [facility] CATH LAB;  Service: Cardiology;  Laterality: N/A;    COLONOSCOPY N/A 5/3/2017    Procedure: COLONOSCOPY;  Surgeon: Rufino Carpenter MD;  Location: [facility] ENDO (4TH FLR);  Service: Endoscopy;  Laterality: N/A;   states can only schedule on Wednesdays    COLONOSCOPY N/A 2/9/2021    Procedure: COLONOSCOPY;  Surgeon: Edil Wong MD;  Location: [facility] ENDO (4TH FLR);   Service: Endoscopy;  Laterality: N/A;  Dialysis MWF/ labwork day of procedure  right arm aceess  per Dr. Colin pt can hold Plavix 5 days prior see note- sm  COVID test on 2/6/21 at [facility] -     COLONOSCOPY N/A 2/10/2021    Procedure: COLONOSCOPY;  Surgeon: Robert Ayers MD;  [facility]  Service: Endoscopy;  Laterality: N/A;  rescheduled due to poor bowel prep-BB  negative covid screening 2/6/21-BB  dialysis M-W-F-BB  okay to r/s for 2/9/21 and to hold Plavix per Dr. KIRAN Wong-BB  labs same day-BB    CORONARY ANGIOGRAPHY N/A 2/10/2025    Procedure: ANGIOGRAM, CORONARY ARTERY;  Surgeon: Ajith Hutchins MD;  Location: [facility] CATH LAB;  Service: Cardiology;  Laterality: N/A;    CORONARY STENT PLACEMENT N/A 7/25/2019    Procedure: INSERTION, STENT, CORONARY ARTERY;  Surgeon: Miki Zendejas MD;  Location: [facility] CATH LAB;  Service: Cardiology;  Laterality: N/A;    DIALYSIS FISTULA CREATION      FISTULOGRAM N/A 2/18/2019    Procedure: Fistulogram;  Surgeon: Yaneth De La Cruz MD;  Location: [facility] CATH LAB;  Service: Cardiology;  Laterality: N/A;    FISTULOGRAM Right 7/23/2019    Procedure: Fistulogram;  Surgeon: Oz Cordoba MD;  Location: [facility] CATH LAB;  Service: Cardiology;  Laterality: Right;    LAPAROSCOPIC ROBOT-ASSISTED SURGICAL REMOVAL OF KIDNEY USING DA JAIME XI Right 5/19/2022    Procedure: XI ROBOTIC NEPHRECTOMY;  Surgeon: Aidan Hendricks MD;  Location: [address];  Service: Urology;  Laterality: Right;  3hrs    LAPAROSCOPIC ROBOT-ASSISTED SURGICAL REMOVAL OF KIDNEY USING DA JAIME XI Left 1/5/2023    Procedure: XI ROBOTIC NEPHRECTOMY;  Surgeon: Aidan Hendricks MD;  Location: [address];  Service: Urology;  Laterality: Left;  4 hrs    LEFT HEART CATHETERIZATION Left 7/25/2019    Procedure: Left heart cath;  Surgeon: Miki Zendejas MD;  Location: [facility] CATH LAB;  Service: Cardiology;  Laterality: Left;    LEFT HEART CATHETERIZATION Left 2/10/2025    Procedure: Left  "heart cath;  Surgeon: Ajith Hutchins MD;  Location: [facility] CATH LAB;  Service: Cardiology;  Laterality: Left;    REPAIR  1/5/2023    Procedure: REPAIR; COLOTOMY;  Surgeon: Maikel Lamb MD;  Location: [facility] OR [address] [place] [address];  Service: General;;    RETINAL LASER PROCEDURE Bilateral 2018 or 2017    Dr. Rothman    VASCULAR SURGERY         Family History   Problem Relation Name Age of Onset    Hypertension Mother      Diabetes Mother      Cataracts Mother      Hypertension Father      Hypertension Sister      Kidney disease Brother      Hypertension Brother      Heart disease Brother      Cancer Maternal Grandfather          Colon CA    Colon cancer Neg Hx      Esophageal cancer Neg Hx      Stomach cancer Neg Hx      Rectal cancer Neg Hx      Ulcerative colitis Neg Hx      Irritable bowel syndrome Neg Hx      Crohn's disease Neg Hx      Celiac disease Neg Hx      Glaucoma Neg Hx      Macular degeneration Neg Hx         Social History[1]    Review of Systems   Constitutional:  Positive for fatigue. Negative for chills and fever.   Respiratory:  Negative for shortness of breath.    Cardiovascular:  Positive for chest pain (Left sided 8/10 and radiates to the L arm). Negative for leg swelling.   Gastrointestinal:  Negative for constipation, diarrhea, nausea and vomiting.   Neurological:  Negative for numbness and headaches.       Physical Exam   /62 (BP Location: Left arm, Patient Position: Lying)   Pulse 93   Temp 98.2 °F (36.8 °C) (Oral)   Resp 20   Ht 5' 7" (1.702 m)   Wt 71.5 kg (157 lb 10.1 oz)   SpO2 96%   BMI 24.69 kg/m²     Physical Exam    ED Course   CAD of native artery w/ stable angina  - PMHx of NSTEMI s/p PTCA, ACS, CAD. Multi-vessel disease shown on PCI on 02/09/25  - Serial troponin I had normal delta troponin. Last troponin measurement was 339  - Left-sided 8/10 sharp chest pain radiating to the left arm, relieved with SL nitroglycerin  - Unclear of whether pt is on Imdur. Continue Imdur ER as " previously prescribed in the morning.  - Continue zetia  qd, atorvastatin qd, and plavix qd as previously prescribed. ASA 81 mg PO qd in the morning.  - Monitor for subsequent s/sx of angina. Give nitroglycerin SL PRN for angina as previous.  - Monitor on telemetry    ESRD on hemodialysis  - Left-sided 8/10 sharp chest pain radiating to the left arm, relieved with SL nitroglycerin during HD  - Pt does not pass urine. HD access is a right arm fistula.  - Monitor CMP for renal function and for s/sx of fluid overload  - Continue renvela as previously prescribed  - Consult nephrology for HD in morning.    Anemia in ESRD  - Hgb 9.2, anemia of chronic disease previously document due to ESRD.  - Continue to monitor CBC daily. Consult nephrology for EPO administration and recommendations    CKD - mineral and bone disorder  - Monitor CMP for renal function daily    PAD  - 6 pts on [id number] score. Continue ASA qd in the morning and give plavix qd for DAPT.  - Monitor for s/sx of clots    HTN  - /73. Continue coreg BID, minoxidil BID, and nifedipine qd as previous.  - Continue to monitor BP daily    T2 DM w/ CKD  - Glucose is currently 139. Last glucose was 151 on 02/11/25  - Monitor ACHS accuchecks    Diastolic CHF  - Per last echo on 02/11/25, EF was 50-55%, Grade II diastolic CHF  - Patient shows no s/sx of fluid overload  - BNP is 1072. Serial troponin I had normal delta troponin. Last troponin measurement was 339  - Continue hydralazine qd, carvedilol qd, and atorvastatin qd as previous.    Hemiparesis affecting right side as late effect of cerebrovascular accident  -  6 pts on [id number] score. Continue ASA qd in the morning and give plavix qd for DAPT.  - Monitor for s/sx of stroke. Neuro checks             [1]   Social History  Tobacco Use    Smoking status: Never    Smokeless tobacco: Never   Substance Use Topics    Alcohol use: Not Currently     Comment: One drink a month    Drug use: No

## 2025-03-01 NOTE — HPI
Mr. Dickinson is a 49 year old man with history of CVA, ACS, CAD, NSTEMI s/p PTCA on Plavix, ESRD on dialysis (MWF), HTN, and renal cell carcinoma s/p nephrectomy who presents to the ED with left-sided 8/10 sharp chest pain radiating to the left arm that occurred while at hemodialysis at 10 AM today. Denies associated SOB, N/V, or diaphoresis. Has has similar pain in the past and states his pain typically starts as a sharp pain in the left arm and eventually spreads to his left lower chest. The patient was given and nitroglycerin 0.4 mg SL X 1 at the HD center. The patient was then given  mg PO X 1 per EMS, which helped the pain subside. The patient then had another episode of 8/10 left-sided chest pain radiating to the left arm that occurred while in the ED which was relieved by nitroglycerin 0.4 mg SL X 1. The patient states they have not had any subsequent episodes of chest pain since the second dose of nitroglycerin. The patient takes 2 nitroglycerin a week when he has sharp left arm pain, which typically relieves his pain before it spreads to his left chest. Also notes tylenol helps as well. The patient reports being fatigued. He denies fever, chills, headache, dyspnea, peripheral edema, vomiting, diarrhea, constipation, or numbness/tingling of the extremities.      In the ED, VSS, afebrile. CBC with stable anemia. Bicarb 21. Cr 6.0 (consistent with ESRD). Glucose 139. Albumin 3.0. BNP 1,072. HS troponin 349>>333>>339. EKG shows SR with LVH, 89 bpm, no ST elevation or depression. CXR with cardiomegaly and perihilar edema in lungs. The patient received sl nitro.    Cardiology consulted for recurrent chest pain and management     Hospital Course:  Cardiology consulted earlier this admission for elevated troponin and pain with dialysis. Cardiology recommended ongoing maximal medical therapy as his lesions are not amenable to PCI and he is not a CABG candidate.      The morning of 3/2 he had 2 episodes of  chest pain responsive to NTG Trop collected in late afternoon results at 5700, up from <350 day prior. Cardiology re consulted. No further episodes of chest pain today at time of our conversation. Started on hep gtt and given full dose ASA. ECG with Q-waves in III and aVF

## 2025-03-01 NOTE — CONSULTS
[facility]  Nephrology  Consult Note    Patient Name: Haroldo Dickinson  MRN: [id number]  Admission Date: 2/28/2025  Hospital Length of Stay: 0 days  Attending Provider: Anjana Maharaj MD   Primary Care Physician: Mireya Larose MD  Principal Problem:Chest pain    Inpatient consult to Nephrology  Consult performed by: Roderick Burleson NP  Consult ordered by: Kim Liu NP  Reason for consult: ESRD        Subjective:     HPI: Haroldo Dickinson is a 49 y.o. male with a previous CVA, ACS, CAD, NSTEMI s/p PTCA on Plavix, ESRD on dialysis (MWF), HTN, and renal cell carcinoma s/p nephrectomy who presents to the ED with left-sided 8/10 sharp chest pain radiating to the left arm that occurred while at hemodialysis. Denies associated SOB, N/V, or diaphoresis. Has has similar pain in the past and states his pain typically starts as a sharp pain in the left arm and eventually spreads to his left lower chest. The patient was given and nitroglycerin 0.4 mg SL X 1 at the HD center. The patient was then given  mg PO X 1 per EMS, which helped the pain subside. The patient then had another episode of 8/10 left-sided chest pain radiating to the left arm that occurred while in the ED which was relieved by nitroglycerin 0.4 mg SL X 1. The patient states they have not had any subsequent episodes of chest pain since the second dose of nitroglycerin. The patient takes 2 nitroglycerin a week when he has sharp left arm pain, which typically relieves his pain before it spreads to his left chest. Also notes tylenol helps as well. In the ED, VSS, afebrile. . BNP 1,072. HS troponin 349>>333>>339.  CXR with cardiomegaly and mild edema.  Nephrology was consulted for ESRD management while IP.    He has extensive cardiac hx, ecently admitted at [facility] with CP and underwent LHC. No culprit lesion was identified. Cardiology recommended maximal medical therapy given extensive multivessel coronary artery disease which is not  amenable to revascularization (either PCI or bypass surgery - transmural scar in distal LAD/left circumflex/PLB noted on PET scan done in October, 2024).     Past Medical History:   Diagnosis Date    CAD (coronary artery disease), native coronary artery 11/21/2019    Cataract     Diabetes mellitus     Diabetic retinopathy     Dialysis patient     DM type 2 causing renal disease, not at goal     ESRD (end stage renal disease) started dialysis 01/2014 06/05/2014    H/o acute coronary syndrome with high troponin 03/11/2024    History of colon polyps 02/10/2021    History of COVID-19 12/29/2022    Hyperparathyroidism, secondary renal 06/05/2014    Hypertension     Hypertensive emergency 11/27/2023    NSTEMI (non-ST elevated myocardial infarction) 12/21/2013    Organ transplant candidate 06/05/2014    Palliative care encounter 10/29/2024    Pleural effusion 06/07/2024    Pneumonia     Post PTCA 08/26/2020    RCA [facility] 7-25-20    Renal cell carcinoma of right kidney     Renal hypertension     Stroke        Past Surgical History:   Procedure Laterality Date    ANGIOGRAM, CORONARY, WITH LEFT HEART CATHETERIZATION N/A 7/1/2021    Procedure: Angiogram, Coronary, with Left Heart Cath;  Surgeon: Miki Zendejas MD;  Location: [facility] CATH LAB;  Service: Cardiology;  Laterality: N/A;    ANGIOGRAM, CORONARY, WITH LEFT HEART CATHETERIZATION N/A 11/28/2023    Procedure: Angiogram, Coronary, with Left Heart Cath;  Surgeon: Noah Pendleton MD;  Location: [facility] CATH LAB;  Service: Cardiology;  Laterality: N/A;    COLONOSCOPY N/A 5/3/2017    Procedure: COLONOSCOPY;  Surgeon: Rufino Carpenter MD;  Location: [facility] ENDO ([address] [place] [address]);  Service: Endoscopy;  Laterality: N/A;  pt states can only schedule on Wednesdays    COLONOSCOPY N/A 2/9/2021    Procedure: COLONOSCOPY;  Surgeon: Edil Wong MD;  Location: [facility] ENDO (4TH FLR);  Service: Endoscopy;  Laterality: N/A;  Dialysis MWF/ labwork day of procedure  right arm aceess  per Dr. Colin pt can  hold Plavix 5 days prior see note- sm  COVID test on 2/6/21 at [facility] -     COLONOSCOPY N/A 2/10/2021    Procedure: COLONOSCOPY;  Surgeon: Robert Ayers MD;  Location: [facility] (4TH FLR);  Service: Endoscopy;  Laterality: N/A;  rescheduled due to poor bowel prep-BB  negative covid screening 2/6/21-BB  dialysis M-W-F-BB  okay to r/s for 2/9/21 and to hold Plavix per Dr. KIRAN Wong-BB  labs same day-BB    CORONARY ANGIOGRAPHY N/A 2/10/2025    Procedure: ANGIOGRAM, CORONARY ARTERY;  Surgeon: Ajith Hutchins MD;  Location: [facility] CATH LAB;  Service: Cardiology;  Laterality: N/A;    CORONARY STENT PLACEMENT N/A 7/25/2019    Procedure: INSERTION, STENT, CORONARY ARTERY;  Surgeon: Miki Zendejas MD;  Location: [facility] CATH LAB;  Service: Cardiology;  Laterality: N/A;    DIALYSIS FISTULA CREATION      FISTULOGRAM N/A 2/18/2019    Procedure: Fistulogram;  Surgeon: Yaneth De La Cruz MD;  Location: [facility] CATH LAB;  Service: Cardiology;  Laterality: N/A;    FISTULOGRAM Right 7/23/2019    Procedure: Fistulogram;  Surgeon: Oz Cordoba MD;  Location: [facility] CATH LAB;  Service: Cardiology;  Laterality: Right;    LAPAROSCOPIC ROBOT-ASSISTED SURGICAL REMOVAL OF KIDNEY USING DA JAIME XI Right 5/19/2022    Procedure: XI ROBOTIC NEPHRECTOMY;  Surgeon: Aidan Hendricks MD;  Location: [address];  Service: Urology;  Laterality: Right;  3hrs    LAPAROSCOPIC ROBOT-ASSISTED SURGICAL REMOVAL OF KIDNEY USING DA JAIME XI Left 1/5/2023    Procedure: XI ROBOTIC NEPHRECTOMY;  Surgeon: Aidan Hendricks MD;  [facility];  Service: Urology;  Laterality: Left;  4 hrs    LEFT HEART CATHETERIZATION Left 7/25/2019    Procedure: Left heart cath;  Surgeon: Miki Zendejas MD;  Location: [facility] CATH LAB;  Service: Cardiology;  Laterality: Left;    LEFT HEART CATHETERIZATION Left 2/10/2025    Procedure: Left heart cath;  Surgeon: Ajith Hutchins MD;  Location: [facility] CATH LAB;  Service: Cardiology;  Laterality: Left;     REPAIR  1/5/2023    Procedure: REPAIR; COLOTOMY;  Surgeon: Maikel Lamb MD;  Location: [facility] OR [address] [place] [address];  Service: General;;    RETINAL LASER PROCEDURE Bilateral 2018 or 2017    Dr. Rothman    VASCULAR SURGERY         Review of patient's allergies indicates:   Allergen Reactions    No known allergies      Current Facility-Administered Medications   Medication Frequency    acetaminophen tablet 650 mg Q4H PRN    aspirin EC tablet 81 mg Daily    atorvastatin tablet 80 mg QHS    carvediloL tablet 25 mg BID WM    clopidogreL tablet 75 mg Daily    dextrose 50% injection 12.5 g PRN    dextrose 50% injection 25 g PRN    ezetimibe tablet 10 mg Daily    glucagon (human recombinant) injection 1 mg PRN    glucose chewable tablet 16 g PRN    glucose chewable tablet 24 g PRN    heparin (porcine) injection 5,000 Units Q8H    hydrALAZINE tablet 25 mg Q8H    isosorbide mononitrate 24 hr tablet 120 mg Daily    melatonin tablet 6 mg Nightly PRN    minoxidiL tablet 10 mg BID    naloxone 0.4 mg/mL injection 0.02 mg PRN    NIFEdipine 24 hr tablet 90 mg Daily    nitroGLYCERIN SL tablet 0.4 mg Q5 Min PRN    ondansetron injection 4 mg Q8H PRN    ranolazine 12 hr tablet 500 mg Daily    sevelamer carbonate tablet 1,600 mg TID WM    sodium chloride 0.9% flush 10 mL Q12H PRN     Facility-Administered Medications Ordered in Other Encounters   Medication Frequency    lidocaine (PF) 10 mg/ml (1%) injection 10 mg Once     Family History       Problem Relation (Age of Onset)    Cancer Maternal Grandfather    Cataracts Mother    Diabetes Mother    Heart disease Brother    Hypertension Mother, Father, Sister, Brother    Kidney disease Brother          Tobacco Use    Smoking status: Never    Smokeless tobacco: Never   Substance and Sexual Activity    Alcohol use: Not Currently     Comment: One drink a month    Drug use: No    Sexual activity: Yes     Partners: Female     Birth control/protection: None     Review of Systems   Constitutional:   Negative for activity change, appetite change, chills, diaphoresis, fatigue and fever.   HENT:  Negative for congestion, rhinorrhea and sore throat.    Eyes:  Negative for photophobia and visual disturbance.   Respiratory:  Negative for cough, shortness of breath and wheezing.    Cardiovascular:  Negative for chest pain, palpitations and leg swelling.   Gastrointestinal:  Negative for abdominal distention, abdominal pain, diarrhea, nausea and vomiting.   Genitourinary:  Positive for decreased urine volume.   Musculoskeletal:  Positive for myalgias. Negative for arthralgias, gait problem, joint swelling and neck pain.   Skin:  Negative for color change, pallor and rash.   Neurological:  Negative for dizziness, syncope, weakness, light-headedness, numbness and headaches.   Psychiatric/Behavioral:  Negative for confusion and hallucinations. The patient is not nervous/anxious.      Objective:     Vital Signs (Most Recent):  Temp: 98.2 °F (36.8 °C) (03/01/25 1209)  Pulse: 98 (03/01/25 1209)  Resp: 18 (03/01/25 1209)  BP: 120/70 (03/01/25 1209)  SpO2: (!) 92 % (03/01/25 1209) Vital Signs (24h Range):  Temp:  [97.7 °F (36.5 °C)-98.2 °F (36.8 °C)] 98.2 °F (36.8 °C)  Pulse:  [] 98  Resp:  [16-20] 18  SpO2:  [92 %-99 %] 92 %  BP: (101-130)/(55-96) 120/70     Weight: 71.1 kg (156 lb 12 oz) (02/28/25 2332)  Body mass index is 24.55 kg/m².  Body surface area is 1.83 meters squared.    No intake/output data recorded.     Physical Exam  Vitals and nursing note reviewed.   Constitutional:       General: He is not in acute distress.     Appearance: He is not ill-appearing, toxic-appearing or diaphoretic.   HENT:      Head: Normocephalic and atraumatic.      Nose: Nose normal.      Mouth/Throat:      Mouth: Mucous membranes are moist.   Eyes:      Pupils: Pupils are equal, round, and reactive to light.   Cardiovascular:      Rate and Rhythm: Normal rate and regular rhythm.      Pulses: Normal pulses.      Heart sounds: Murmur  heard.      Arteriovenous access: Right arteriovenous access is present.     Comments: +thrill  Pulmonary:      Effort: Pulmonary effort is normal. No respiratory distress.      Breath sounds: No wheezing, rhonchi or rales.   Chest:      Chest wall: No tenderness.   Abdominal:      General: Bowel sounds are normal. There is no distension.      Palpations: Abdomen is soft.      Tenderness: There is no abdominal tenderness. There is no guarding.   Musculoskeletal:         General: Normal range of motion.      Cervical back: Normal range of motion.      Right lower leg: No edema.      Left lower leg: No edema.   Skin:     General: Skin is warm and dry.      Capillary Refill: Capillary refill takes less than 2 seconds.   Neurological:      General: No focal deficit present.      Mental Status: He is alert and oriented to person, place, and time.      Sensory: No sensory deficit.      Motor: No weakness.   Psychiatric:         Mood and Affect: Mood normal.         Behavior: Behavior normal.          Significant Labs:  CBC:   Recent Labs   Lab 03/01/25  0535   WBC 12.83*   RBC 3.16*   HGB 9.5*   HCT 30.1*      MCV 95   MCH 30.1   MCHC 31.6*     CMP:   Recent Labs   Lab 03/01/25  0535   *   CALCIUM 9.3   ALBUMIN 3.1*   PROT 7.8      K 4.2   CO2 20*      BUN 49*   CREATININE 8.3*   ALKPHOS 91   ALT 8*   AST 16   BILITOT 0.6       Assessment/Plan:     Renal/  ESRD on hemodialysis  ESRD on iHD MWF  [facility] [place] [facility]  4 hours  EDW:  69.5 kg  RFA AVF    Plan/Recommendations:  -no emergent need for RRT today  -his dialysis unit will be closed on Tuesday, patients have been adjusted to dialyze tomorrow.  -will plan to monitor today, plan for HD after midnight to maintain OP schedule  -UF 2L as tolerated  -will defer EPO to his OP dialysis unit  -high risk of mortality due to comorbid conditions      Roderick Beltran NP  Nephrology  [facility]

## 2025-03-01 NOTE — HOSPITAL COURSE
Patient with known multivessel CAD presenting with recurrent chest pain. HS trop below baseline and flat. Recently started on medical management. Cards consulted. Nephro consulted for HD. Discussed with cardiology, due to severity of disease, patient is likely to have ongoing chest pain. Okay to use NTG prn to relief. He is on max medical therapy. Lesions are not amenable to PCI and he is not a CABG candidate.     2 episodes of chest pain responsive to NTG Am 3/2. Trop collected in late afternoon results at 5700, up from <350 day prior. Cardiology re consulted. No further episodes of chest pain today but has had recurrent left arm pain. Started on hep gtt and given full dose ASA for NSTEMI. HS trop peaked at 7200 early am 3/3. No episodes of chest pain on hep gtt.

## 2025-03-01 NOTE — SUBJECTIVE & OBJECTIVE
Past Medical History:   Diagnosis Date    CAD (coronary artery disease), native coronary artery 11/21/2019    Cataract     Diabetes mellitus     Diabetic retinopathy     Dialysis patient     DM type 2 causing renal disease, not at goal     ESRD (end stage renal disease) started dialysis 01/2014 06/05/2014    H/o acute coronary syndrome with high troponin 03/11/2024    History of colon polyps 02/10/2021    History of COVID-19 12/29/2022    Hyperparathyroidism, secondary renal 06/05/2014    Hypertension     Hypertensive emergency 11/27/2023    NSTEMI (non-ST elevated myocardial infarction) 12/21/2013    Organ transplant candidate 06/05/2014    Palliative care encounter 10/29/2024    Pleural effusion 06/07/2024    Pneumonia     Post PTCA 08/26/2020    RCA HILLARY 7-25-20    Renal cell carcinoma of right kidney     Renal hypertension     Stroke        Past Surgical History:   Procedure Laterality Date    ANGIOGRAM, CORONARY, WITH LEFT HEART CATHETERIZATION N/A 7/1/2021    Procedure: Angiogram, Coronary, with Left Heart Cath;  Surgeon: Miki Zendejas MD;  Location: [facility] CATH LAB;  Service: Cardiology;  Laterality: N/A;    ANGIOGRAM, CORONARY, WITH LEFT HEART CATHETERIZATION N/A 11/28/2023    Procedure: Angiogram, Coronary, with Left Heart Cath;  Surgeon: Noah Pendleton MD;  Location: [facility] CATH LAB;  Service: Cardiology;  Laterality: N/A;    COLONOSCOPY N/A 5/3/2017    Procedure: COLONOSCOPY;  Surgeon: Rufino Carpenter MD;  Location: [address]);  Service: Endoscopy;  Laterality: N/A;  pt states can only schedule on Wednesdays    COLONOSCOPY N/A 2/9/2021    Procedure: COLONOSCOPY;  Surgeon: Edil Wong MD;  Location: [address]);  Service: Endoscopy;  Laterality: N/A;  Dialysis MWF/ labwork day of procedure  right arm aceess  per Dr. Colin pt can hold Plavix 5 days prior see note- sm  COVID test on 2/6/21 at [facility] - sm    COLONOSCOPY N/A 2/10/2021    Procedure: COLONOSCOPY;  Surgeon: Robert Ayers,  MD;  Location: [facility] ENDO (4TH FLR);  Service: Endoscopy;  Laterality: N/A;  rescheduled due to poor bowel prep-BB  negative covid screening 2/6/21-BB  dialysis M-W-F-BB  okay to r/s for 2/9/21 and to hold Plavix per Dr. KIRAN Wong-BB  labs same day-BB    CORONARY ANGIOGRAPHY N/A 2/10/2025    Procedure: ANGIOGRAM, CORONARY ARTERY;  Surgeon: Ajith Hutchins MD;  Location: [facility] CATH LAB;  Service: Cardiology;  Laterality: N/A;    CORONARY STENT PLACEMENT N/A 7/25/2019    Procedure: INSERTION, STENT, CORONARY ARTERY;  Surgeon: Mkii Zendejas MD;  Location: [facility] CATH LAB;  Service: Cardiology;  Laterality: N/A;    DIALYSIS FISTULA CREATION      FISTULOGRAM N/A 2/18/2019    Procedure: Fistulogram;  Surgeon: Yaneth De La Cruz MD;  Location: [facility] CATH LAB;  Service: Cardiology;  Laterality: N/A;    FISTULOGRAM Right 7/23/2019    Procedure: Fistulogram;  Surgeon: Oz Cordoba MD;  Location: [facility] CATH LAB;  Service: Cardiology;  Laterality: Right;    LAPAROSCOPIC ROBOT-ASSISTED SURGICAL REMOVAL OF KIDNEY USING DA JAIME XI Right 5/19/2022    Procedure: XI ROBOTIC NEPHRECTOMY;  Surgeon: Aidan Hendricks MD;  [address];  Service: Urology;  Laterality: Right;  3hrs    LAPAROSCOPIC ROBOT-ASSISTED SURGICAL REMOVAL OF KIDNEY USING DA JAIME XI Left 1/5/2023    Procedure: XI ROBOTIC NEPHRECTOMY;  Surgeon: Aidan Hendricks MD;  [address];  Service: Urology;  Laterality: Left;  4 hrs    LEFT HEART CATHETERIZATION Left 7/25/2019    Procedure: Left heart cath;  Surgeon: Miki Zendejas MD;  Location: [facility] CATH LAB;  Service: Cardiology;  Laterality: Left;    LEFT HEART CATHETERIZATION Left 2/10/2025    Procedure: Left heart cath;  Surgeon: Ajith Hutchins MD;  Location: [facility] CATH LAB;  Service: Cardiology;  Laterality: Left;    REPAIR  1/5/2023    Procedure: REPAIR; COLOTOMY;  Surgeon: Maikel Lamb MD;  Location: [facility] 2ND FLR;  Service: General;;    RETINAL LASER PROCEDURE  Bilateral 2018 or 2017    Dr. Rothman    VASCULAR SURGERY         Review of patient's allergies indicates:   Allergen Reactions    No known allergies        Current Facility-Administered Medications on File Prior to Encounter   Medication    lidocaine (PF) 10 mg/ml (1%) injection 10 mg     Current Outpatient Medications on File Prior to Encounter   Medication Sig    aspirin (ECOTRIN) 81 MG EC tablet Take 1 tablet (81 mg total) by mouth once daily.    atorvastatin (LIPITOR) 80 MG tablet Take 1 tablet (80 mg total) by mouth once daily.    carvediloL (COREG) 25 MG tablet Take 1 tablet (25 mg total) by mouth 2 (two) times daily with meals.    clopidogreL (PLAVIX) 75 mg tablet Take 1 tablet (75 mg total) by mouth once daily.    epoetin philip (PROCRIT) 4,000 unit/mL injection Inject 1.93 mLs (7,720 Units total) into the skin every Mon, Wed, Fri.    ezetimibe (ZETIA) 10 mg tablet Take 1 tablet (10 mg total) by mouth once daily.    hydrALAZINE (APRESOLINE) 25 MG tablet Take 1 tablet (25 mg total) by mouth every 8 (eight) hours.    isosorbide mononitrate (IMDUR) 120 MG 24 hr tablet Take 1 tablet (120 mg total) by mouth once daily.    methoxy peg-epoetin beta (MIRCERA INJ) 150 mcg.    minoxidiL (LONITEN) 10 MG Tab Take 1 tablet (10 mg total) by mouth 2 (two) times daily.    NIFEdipine (PROCARDIA-XL) 90 MG (OSM) 24 hr tablet Take 1 tablet (90 mg total) by mouth once daily.    nitroGLYCERIN (NITROSTAT) 0.4 MG SL tablet Place 1 tablet (0.4 mg total) under the tongue every 5 (five) minutes as needed for Chest pain.    ranolazine (RANEXA) 500 MG Tb12 Take 1 tablet (500 mg total) by mouth Daily.    sevelamer carbonate (RENVELA) 800 mg Tab Take 2 tablets (1,600 mg total) by mouth 3 (three) times daily with meals.    tenapanor (XPHOZAH) 20 mg Tab Take 1 tablet by mouth.     Family History       Problem Relation (Age of Onset)    Cancer Maternal Grandfather    Cataracts Mother    Diabetes Mother    Heart disease Brother    Hypertension  Mother, Father, Sister, Brother    Kidney disease Brother          Tobacco Use    Smoking status: Never    Smokeless tobacco: Never   Substance and Sexual Activity    Alcohol use: Not Currently     Comment: One drink a month    Drug use: No    Sexual activity: Yes     Partners: Female     Birth control/protection: None     Review of Systems   Constitutional:  Positive for fatigue. Negative for appetite change, chills, diaphoresis and fever.   HENT:  Negative for congestion, rhinorrhea and sore throat.    Eyes:  Negative for photophobia and visual disturbance.   Respiratory:  Negative for cough, shortness of breath and wheezing.    Cardiovascular:  Positive for chest pain. Negative for palpitations and leg swelling.   Gastrointestinal:  Negative for abdominal distention, abdominal pain, diarrhea, nausea and vomiting.   Genitourinary:  Positive for decreased urine volume (ESRD on HD; anuric).   Musculoskeletal:  Positive for myalgias. Negative for arthralgias, gait problem, joint swelling and neck pain.   Skin:  Negative for color change, pallor and rash.   Neurological:  Negative for dizziness, syncope, weakness, light-headedness, numbness and headaches.   Psychiatric/Behavioral:  Negative for confusion and hallucinations. The patient is not nervous/anxious.      Objective:     Vital Signs (Most Recent):  Temp: 98.2 °F (36.8 °C) (02/28/25 1319)  Pulse: 93 (02/28/25 1900)  Resp: 20 (02/28/25 1900)  BP: 101/62 (02/28/25 1900)  SpO2: 96 % (02/28/25 1900) Vital Signs (24h Range):  Temp:  [98.1 °F (36.7 °C)-98.2 °F (36.8 °C)] 98.2 °F (36.8 °C)  Pulse:  [80-97] 93  Resp:  [17-20] 20  SpO2:  [95 %-98 %] 96 %  BP: (101-118)/(55-73) 101/62     Weight: 71.5 kg (157 lb 10.1 oz)  Body mass index is 24.69 kg/m².     Physical Exam  Vitals and nursing note reviewed.   Constitutional:       General: He is not in acute distress.     Appearance: He is not ill-appearing, toxic-appearing or diaphoretic.   HENT:      Head: Normocephalic  and atraumatic.      Nose: Nose normal.      Mouth/Throat:      Mouth: Mucous membranes are moist.   Eyes:      Pupils: Pupils are equal, round, and reactive to light.   Cardiovascular:      Rate and Rhythm: Normal rate and regular rhythm.      Pulses: Normal pulses.      Heart sounds: Murmur heard.      Arteriovenous access: Right arteriovenous access is present.     Comments: +thrill  Pulmonary:      Effort: Pulmonary effort is normal. No respiratory distress.      Breath sounds: No wheezing, rhonchi or rales.   Chest:      Chest wall: No tenderness.   Abdominal:      General: Bowel sounds are normal. There is no distension.      Palpations: Abdomen is soft.      Tenderness: There is no abdominal tenderness. There is no guarding.   Musculoskeletal:         General: Normal range of motion.      Cervical back: Normal range of motion.      Right lower leg: No edema.      Left lower leg: No edema.   Skin:     General: Skin is warm and dry.      Capillary Refill: Capillary refill takes less than 2 seconds.   Neurological:      General: No focal deficit present.      Mental Status: He is alert and oriented to person, place, and time.      Sensory: No sensory deficit.      Motor: No weakness.   Psychiatric:         Mood and Affect: Mood normal.         Behavior: Behavior normal.              CRANIAL NERVES     CN III, IV, VI   Pupils are equal, round, and reactive to light.       Significant Labs: All pertinent labs within the past 24 hours have been reviewed.  CBC:   Recent Labs   Lab 02/28/25  1214   WBC 9.73   HGB 9.2*   HCT 28.9*        CMP:   Recent Labs   Lab 02/28/25  1214      K 4.0      CO2 21*   *   BUN 38*   CREATININE 6.0*   CALCIUM 8.7   PROT 7.6   ALBUMIN 3.0*   BILITOT 0.5   ALKPHOS 88   AST 17   ALT 9*   ANIONGAP 11     Cardiac Markers:   Recent Labs   Lab 02/28/25  1214   BNP 1,072*     Troponin:   Recent Labs   Lab 02/28/25  1214 02/28/25  1327 02/28/25  1512   TROPONINIHS 349*  333* 339*       Significant Imaging: I have reviewed all pertinent imaging results/findings within the past 24 hours.  Imaging Results              X-Ray Chest AP Portable (Final result)  Result time 02/28/25 13:45:25      Final result by Wilberto Cornejo MD (02/28/25 13:45:25)                   Impression:      Cardiomegaly and other findings likely representing CHF with perihilar edema.      Electronically signed by: Wilberto Cornejo MD  Date:    02/28/2025  Time:    13:45               Narrative:    EXAMINATION:  XR CHEST AP PORTABLE    CLINICAL HISTORY:  Chest pain, unspecified    TECHNIQUE:  Single frontal view of the chest was performed.    COMPARISON:  02/09/2025    FINDINGS:  The heart size is mildly enlarged.  Mediastinal structures are midline with note of aortic atherosclerosis.  Lungs are expanded with mildly increased pulmonary vascularity.  Faint ground-glass opacity is present in the central regions of both lungs with bilateral pattern likely indicating edema.  Multifocal pneumonia is also possible.  No significant pleural fluid is seen.  Skeletal structures are intact.  Monitor wires overlie the chest.

## 2025-03-01 NOTE — ASSESSMENT & PLAN NOTE
Acute, patient reports chest pain was resolved on admit. Described as left lower chest wall pain that is sharp in nature. No associated SOB, N/V, or diaphoresis. Concern for MSK component. Will give tylenol and gabapentin. EKG without ST-segment elevation or depression, HR 89.  Troponin 349>>333>>339 (chronically elevated per chart review). BNP 1,072. CXR with cardiomegaly and perihilar edema.   Recently admitted at [facility] with CP and underwent LHC (see below). No culprit lesion was identified. Cardiology recommended maximal medical therapy given extensive multivessel coronary artery disease which is not amenable to revascularization (either PCI or bypass surgery - transmural scar in distal LAD/left circumflex/PLB noted on PET scan done in October, 2024).  - Continue ASA, statin, plavix, BB, imdur, and ranexa.  - Cardiac monitoring.  - PRN EKG and sl nitro for chest pain  - cards consult as chest pain has recurred     -ECHO 2/10/25:    Left Ventricle: The left ventricle is mildly dilated. Mildly increased wall thickness. There is mild concentric hypertrophy. There is low normal systolic function with a visually estimated ejection fraction of 50 - 55%. Grade II diastolic dysfunction.    Right Ventricle: Normal right ventricular cavity size. Systolic function is normal.    Left Atrium: Left atrium is mildly dilated.    Aortic Valve: The aortic valve is a trileaflet valve. There is moderate aortic valve sclerosis.    Mitral Valve: There is mild to moderate regurgitation.    Tricuspid Valve: There is mild regurgitation.    -Coronary angiography 2/10/25:     Severe multivessel coronary artery disease is present    Distal left main had 30-40% calcific stenosis    Proximal LAD had 70-80% calcific stenosis and 100% stenosis in mid to distal portion.  Distal LAD was getting filled via septal collaterals    2nd diagonal artery had 90% stenosis in midportion    Mid left circumflex artery had 40% calcific stenosis.   Stenosis severity can not be determined accurately due to presence of eccentric calcification    Mid RCA had 90% InStent restenosis followed by chronic total occlusion.  Distal RCA was getting filled via right to right collaterals and left-to-right collaterals    Right common femoral arterial access was closed with a Perclose device    Low normal LVEDP (7 mmHg)

## 2025-03-01 NOTE — HPI
Haroldo Dickinson is a 49 y.o. male with a PMHx of previous CVA, ACS, CAD, NSTEMI s/p PTCA on Plavix, ESRD on dialysis (MWF), HTN, and renal cell carcinoma s/p nephrectomy who presents to the ED with left-sided 8/10 sharp chest pain radiating to the left arm that occurred while at hemodialysis at 10 AM today. Denies associated SOB, N/V, or diaphoresis. Has has similar pain in the past and states his pain typically starts as a sharp pain in the left arm and eventually spreads to his left lower chest. The patient was given and nitroglycerin 0.4 mg SL X 1 at the HD center. The patient was then given  mg PO X 1 per EMS, which helped the pain subside. The patient then had another episode of 8/10 left-sided chest pain radiating to the left arm that occurred while in the ED which was relieved by nitroglycerin 0.4 mg SL X 1. The patient states they have not had any subsequent episodes of chest pain since the second dose of nitroglycerin. The patient takes 2 nitroglycerin a week when he has sharp left arm pain, which typically relieves his pain before it spreads to his left chest. Also notes tylenol helps as well. The patient reports being fatigued. He denies fever, chills, headache, dyspnea, peripheral edema, vomiting, diarrhea, constipation, or numbness/tingling of the extremities.     In the ED, VSS, afebrile. CBC with stable anemia. Bicarb 21. Cr 6.0 (consistent with ESRD). Glucose 139. Albumin 3.0. BNP 1,072. HS troponin 349>>333>>339. EKG shows SR with LVH, 89 bpm, no ST elevation or depression. CXR with cardiomegaly and perihilar edema in lungs. The patient received sl nitro.

## 2025-03-02 PROBLEM — I21.4 NSTEMI (NON-ST ELEVATED MYOCARDIAL INFARCTION): Status: ACTIVE | Noted: 2025-02-09

## 2025-03-02 LAB
ALBUMIN SERPL BCP-MCNC: 3 G/DL (ref 3.5–5.2)
ALP SERPL-CCNC: 89 U/L (ref 40–150)
ALT SERPL W/O P-5'-P-CCNC: 9 U/L (ref 10–44)
ANION GAP SERPL CALC-SCNC: 14 MMOL/L (ref 8–16)
APTT PPP: 32.5 SEC (ref 21–32)
APTT PPP: 32.5 SEC (ref 21–32)
APTT PPP: 37.9 SEC (ref 21–32)
AST SERPL-CCNC: 23 U/L (ref 10–40)
BASOPHILS # BLD AUTO: 0.12 K/UL (ref 0–0.2)
BASOPHILS # BLD AUTO: 0.13 K/UL (ref 0–0.2)
BASOPHILS NFR BLD: 0.9 % (ref 0–1.9)
BASOPHILS NFR BLD: 1.2 % (ref 0–1.9)
BILIRUB SERPL-MCNC: 0.7 MG/DL (ref 0.1–1)
BUN SERPL-MCNC: 65 MG/DL (ref 6–20)
CALCIUM SERPL-MCNC: 9.7 MG/DL (ref 8.7–10.5)
CHLORIDE SERPL-SCNC: 100 MMOL/L (ref 95–110)
CO2 SERPL-SCNC: 19 MMOL/L (ref 23–29)
CREAT SERPL-MCNC: 10.1 MG/DL (ref 0.5–1.4)
DIFFERENTIAL METHOD BLD: ABNORMAL
DIFFERENTIAL METHOD BLD: ABNORMAL
EOSINOPHIL # BLD AUTO: 0.3 K/UL (ref 0–0.5)
EOSINOPHIL # BLD AUTO: 0.4 K/UL (ref 0–0.5)
EOSINOPHIL NFR BLD: 3.1 % (ref 0–8)
EOSINOPHIL NFR BLD: 3.4 % (ref 0–8)
ERYTHROCYTE [DISTWIDTH] IN BLOOD BY AUTOMATED COUNT: 16.4 % (ref 11.5–14.5)
ERYTHROCYTE [DISTWIDTH] IN BLOOD BY AUTOMATED COUNT: 16.4 % (ref 11.5–14.5)
EST. GFR  (NO RACE VARIABLE): 5.8 ML/MIN/1.73 M^2
GLUCOSE SERPL-MCNC: 111 MG/DL (ref 70–110)
HCT VFR BLD AUTO: 26.3 % (ref 40–54)
HCT VFR BLD AUTO: 30.7 % (ref 40–54)
HGB BLD-MCNC: 8.4 G/DL (ref 14–18)
HGB BLD-MCNC: 9.4 G/DL (ref 14–18)
IMM GRANULOCYTES # BLD AUTO: 0.07 K/UL (ref 0–0.04)
IMM GRANULOCYTES # BLD AUTO: 0.08 K/UL (ref 0–0.04)
IMM GRANULOCYTES NFR BLD AUTO: 0.6 % (ref 0–0.5)
IMM GRANULOCYTES NFR BLD AUTO: 0.7 % (ref 0–0.5)
INR PPP: 1.1 (ref 0.8–1.2)
LYMPHOCYTES # BLD AUTO: 1 K/UL (ref 1–4.8)
LYMPHOCYTES # BLD AUTO: 1.2 K/UL (ref 1–4.8)
LYMPHOCYTES NFR BLD: 9.4 % (ref 18–48)
LYMPHOCYTES NFR BLD: 9.9 % (ref 18–48)
MAGNESIUM SERPL-MCNC: 1.9 MG/DL (ref 1.6–2.6)
MCH RBC QN AUTO: 29 PG (ref 27–31)
MCH RBC QN AUTO: 29.5 PG (ref 27–31)
MCHC RBC AUTO-ENTMCNC: 30.6 G/DL (ref 32–36)
MCHC RBC AUTO-ENTMCNC: 31.9 G/DL (ref 32–36)
MCV RBC AUTO: 92 FL (ref 82–98)
MCV RBC AUTO: 95 FL (ref 82–98)
MONOCYTES # BLD AUTO: 0.7 K/UL (ref 0.3–1)
MONOCYTES # BLD AUTO: 1.3 K/UL (ref 0.3–1)
MONOCYTES NFR BLD: 6.7 % (ref 4–15)
MONOCYTES NFR BLD: 9.6 % (ref 4–15)
NEUTROPHILS # BLD AUTO: 8.2 K/UL (ref 1.8–7.7)
NEUTROPHILS # BLD AUTO: 9.9 K/UL (ref 1.8–7.7)
NEUTROPHILS NFR BLD: 76.1 % (ref 38–73)
NEUTROPHILS NFR BLD: 78.4 % (ref 38–73)
NRBC BLD-RTO: 0 /100 WBC
NRBC BLD-RTO: 0 /100 WBC
OHS QRS DURATION: 118 MS
OHS QRS DURATION: 124 MS
OHS QTC CALCULATION: 471 MS
OHS QTC CALCULATION: 488 MS
PHOSPHATE SERPL-MCNC: 4.4 MG/DL (ref 2.7–4.5)
PLATELET # BLD AUTO: 406 K/UL (ref 150–450)
PLATELET # BLD AUTO: 442 K/UL (ref 150–450)
PMV BLD AUTO: 10.1 FL (ref 9.2–12.9)
PMV BLD AUTO: 10.2 FL (ref 9.2–12.9)
POTASSIUM SERPL-SCNC: 4.7 MMOL/L (ref 3.5–5.1)
PROT SERPL-MCNC: 8 G/DL (ref 6–8.4)
PROTHROMBIN TIME: 11.9 SEC (ref 9–12.5)
RBC # BLD AUTO: 2.85 M/UL (ref 4.6–6.2)
RBC # BLD AUTO: 3.24 M/UL (ref 4.6–6.2)
SODIUM SERPL-SCNC: 133 MMOL/L (ref 136–145)
TROPONIN I SERPL DL<=0.01 NG/ML-MCNC: 5684 NG/L (ref 0–35)
TROPONIN I SERPL DL<=0.01 NG/ML-MCNC: 5746 NG/L (ref 0–35)
WBC # BLD AUTO: 10.5 K/UL (ref 3.9–12.7)
WBC # BLD AUTO: 13.04 K/UL (ref 3.9–12.7)

## 2025-03-02 PROCEDURE — 36415 COLL VENOUS BLD VENIPUNCTURE: CPT | Mod: HCNC,XB | Performed by: STUDENT IN AN ORGANIZED HEALTH CARE EDUCATION/TRAINING PROGRAM

## 2025-03-02 PROCEDURE — 84100 ASSAY OF PHOSPHORUS: CPT | Mod: HCNC | Performed by: NURSE PRACTITIONER

## 2025-03-02 PROCEDURE — 93010 ELECTROCARDIOGRAM REPORT: CPT | Mod: HCNC,,, | Performed by: INTERNAL MEDICINE

## 2025-03-02 PROCEDURE — 21400001 HC TELEMETRY ROOM: Mod: HCNC

## 2025-03-02 PROCEDURE — 85025 COMPLETE CBC W/AUTO DIFF WBC: CPT | Mod: 91,HCNC | Performed by: STUDENT IN AN ORGANIZED HEALTH CARE EDUCATION/TRAINING PROGRAM

## 2025-03-02 PROCEDURE — 63600175 PHARM REV CODE 636 W HCPCS: Mod: HCNC | Performed by: NURSE PRACTITIONER

## 2025-03-02 PROCEDURE — 25000003 PHARM REV CODE 250: Mod: HCNC

## 2025-03-02 PROCEDURE — 85025 COMPLETE CBC W/AUTO DIFF WBC: CPT | Mod: HCNC | Performed by: NURSE PRACTITIONER

## 2025-03-02 PROCEDURE — 96372 THER/PROPH/DIAG INJ SC/IM: CPT | Performed by: NURSE PRACTITIONER

## 2025-03-02 PROCEDURE — 36415 COLL VENOUS BLD VENIPUNCTURE: CPT | Mod: HCNC | Performed by: NURSE PRACTITIONER

## 2025-03-02 PROCEDURE — 80100014 HC HEMODIALYSIS 1:1: Mod: HCNC

## 2025-03-02 PROCEDURE — 84484 ASSAY OF TROPONIN QUANT: CPT | Mod: 91,HCNC | Performed by: STUDENT IN AN ORGANIZED HEALTH CARE EDUCATION/TRAINING PROGRAM

## 2025-03-02 PROCEDURE — 5A1D70Z PERFORMANCE OF URINARY FILTRATION, INTERMITTENT, LESS THAN 6 HOURS PER DAY: ICD-10-PCS | Performed by: STUDENT IN AN ORGANIZED HEALTH CARE EDUCATION/TRAINING PROGRAM

## 2025-03-02 PROCEDURE — 80053 COMPREHEN METABOLIC PANEL: CPT | Mod: HCNC | Performed by: NURSE PRACTITIONER

## 2025-03-02 PROCEDURE — 25000003 PHARM REV CODE 250: Mod: HCNC | Performed by: NURSE PRACTITIONER

## 2025-03-02 PROCEDURE — 83735 ASSAY OF MAGNESIUM: CPT | Mod: HCNC | Performed by: NURSE PRACTITIONER

## 2025-03-02 PROCEDURE — 63600175 PHARM REV CODE 636 W HCPCS: Mod: HCNC | Performed by: STUDENT IN AN ORGANIZED HEALTH CARE EDUCATION/TRAINING PROGRAM

## 2025-03-02 PROCEDURE — 93005 ELECTROCARDIOGRAM TRACING: CPT | Mod: HCNC

## 2025-03-02 PROCEDURE — 25000003 PHARM REV CODE 250: Mod: HCNC | Performed by: STUDENT IN AN ORGANIZED HEALTH CARE EDUCATION/TRAINING PROGRAM

## 2025-03-02 PROCEDURE — 85730 THROMBOPLASTIN TIME PARTIAL: CPT | Mod: 91,HCNC | Performed by: STUDENT IN AN ORGANIZED HEALTH CARE EDUCATION/TRAINING PROGRAM

## 2025-03-02 PROCEDURE — 85610 PROTHROMBIN TIME: CPT | Mod: HCNC | Performed by: STUDENT IN AN ORGANIZED HEALTH CARE EDUCATION/TRAINING PROGRAM

## 2025-03-02 RX ORDER — NAPROXEN SODIUM 220 MG/1
243 TABLET, FILM COATED ORAL ONCE
Status: COMPLETED | OUTPATIENT
Start: 2025-03-02 | End: 2025-03-02

## 2025-03-02 RX ORDER — ASPIRIN 81 MG/1
81 TABLET ORAL DAILY
Qty: 30 TABLET | Refills: 11 | Status: SHIPPED | OUTPATIENT
Start: 2025-03-02 | End: 2026-03-02

## 2025-03-02 RX ORDER — METHOCARBAMOL 500 MG/1
500 TABLET, FILM COATED ORAL 4 TIMES DAILY PRN
Qty: 20 TABLET | Refills: 0 | Status: SHIPPED | OUTPATIENT
Start: 2025-03-02 | End: 2025-03-11 | Stop reason: CLARIF

## 2025-03-02 RX ORDER — METHOCARBAMOL 500 MG/1
500 TABLET, FILM COATED ORAL 4 TIMES DAILY PRN
Status: DISCONTINUED | OUTPATIENT
Start: 2025-03-02 | End: 2025-03-04 | Stop reason: HOSPADM

## 2025-03-02 RX ORDER — NITROGLYCERIN 0.4 MG/1
0.4 TABLET SUBLINGUAL EVERY 5 MIN PRN
Qty: 25 TABLET | Refills: 1 | Status: SHIPPED | OUTPATIENT
Start: 2025-03-02 | End: 2025-03-12

## 2025-03-02 RX ORDER — HEPARIN SODIUM,PORCINE/D5W 25000/250
0-40 INTRAVENOUS SOLUTION INTRAVENOUS CONTINUOUS
Status: DISCONTINUED | OUTPATIENT
Start: 2025-03-02 | End: 2025-03-04

## 2025-03-02 RX ADMIN — ASPIRIN 81 MG: 81 TABLET, COATED ORAL at 08:03

## 2025-03-02 RX ADMIN — CLOPIDOGREL BISULFATE 75 MG: 75 TABLET ORAL at 08:03

## 2025-03-02 RX ADMIN — METHOCARBAMOL 500 MG: 500 TABLET ORAL at 04:03

## 2025-03-02 RX ADMIN — SEVELAMER CARBONATE 1600 MG: 800 TABLET, FILM COATED ORAL at 04:03

## 2025-03-02 RX ADMIN — NIFEDIPINE 90 MG: 60 TABLET, FILM COATED, EXTENDED RELEASE ORAL at 08:03

## 2025-03-02 RX ADMIN — NITROGLYCERIN 0.4 MG: 0.4 TABLET SUBLINGUAL at 08:03

## 2025-03-02 RX ADMIN — ACETAMINOPHEN 650 MG: 325 TABLET ORAL at 12:03

## 2025-03-02 RX ADMIN — ASPIRIN 243 MG: 81 TABLET, CHEWABLE ORAL at 03:03

## 2025-03-02 RX ADMIN — HEPARIN SODIUM 5000 UNITS: 5000 INJECTION INTRAVENOUS; SUBCUTANEOUS at 06:03

## 2025-03-02 RX ADMIN — ISOSORBIDE MONONITRATE 120 MG: 60 TABLET, EXTENDED RELEASE ORAL at 08:03

## 2025-03-02 RX ADMIN — RANOLAZINE 500 MG: 500 TABLET, EXTENDED RELEASE ORAL at 05:03

## 2025-03-02 RX ADMIN — NITROGLYCERIN 0.4 MG: 0.4 TABLET SUBLINGUAL at 07:03

## 2025-03-02 RX ADMIN — SEVELAMER CARBONATE 1600 MG: 800 TABLET, FILM COATED ORAL at 08:03

## 2025-03-02 RX ADMIN — ACETAMINOPHEN 650 MG: 325 TABLET ORAL at 09:03

## 2025-03-02 RX ADMIN — HYDRALAZINE HYDROCHLORIDE 25 MG: 25 TABLET ORAL at 06:03

## 2025-03-02 RX ADMIN — EZETIMIBE 10 MG: 10 TABLET ORAL at 08:03

## 2025-03-02 RX ADMIN — CARVEDILOL 25 MG: 25 TABLET, FILM COATED ORAL at 05:03

## 2025-03-02 RX ADMIN — ATORVASTATIN CALCIUM 80 MG: 40 TABLET, FILM COATED ORAL at 09:03

## 2025-03-02 RX ADMIN — SEVELAMER CARBONATE 1600 MG: 800 TABLET, FILM COATED ORAL at 12:03

## 2025-03-02 RX ADMIN — HEPARIN SODIUM AND DEXTROSE 12 UNITS/KG/HR: 10000; 5 INJECTION INTRAVENOUS at 06:03

## 2025-03-02 NOTE — NURSING
Pt c/o left sided chest pain 7/10.NTG administered.vs:106-/60-96% on r/a.STAT EKG ordered.Dr Cary notified.

## 2025-03-02 NOTE — NURSING
"Pt requesting a "Muscle Relaxer" for c/o Back Pain.Giuliana BAIG notified.Robaxin administered as ordered.  "

## 2025-03-02 NOTE — PROGRESS NOTES
Arrived at patient's bedside for Hemodialysis 1:1. Dialysis started via 15 gauge fistula needles to LFA fistula. Goal decreased to 1 liter due to lower blood pressure with systolic 102/62.

## 2025-03-02 NOTE — ASSESSMENT & PLAN NOTE
Patient's blood pressure range in the last 24 hours was: BP  Min: 100/55  Max: 132/85.The patient's inpatient anti-hypertensive regimen is listed below:  Current Antihypertensives  carvediloL tablet 25 mg, 2 times daily with meals, Oral  hydrALAZINE tablet 25 mg, Every 8 hours, Oral  NIFEdipine 24 hr tablet 90 mg, Daily, Oral  ranolazine 12 hr tablet 500 mg, Daily, Oral  minoxidiL tablet 10 mg, 2 times daily, Oral  isosorbide mononitrate 24 hr tablet 120 mg, Daily, Oral  nitroGLYCERIN SL tablet 0.4 mg, Every 5 min PRN, Sublingual  nitroGLYCERIN (NITROSTAT) SL tablet, Every 5 min PRN, Sublingual    Plan  - BP is controlled, no changes needed to their regimen

## 2025-03-02 NOTE — HOSPITAL COURSE
Cardiology consulted earlier this admission for elevated troponin and pain with dialysis. Cardiology recommended ongoing maximal medical therapy as his lesions are not amenable to PCI and he is not a CABG candidate.      The morning of 3/2 he had 2 episodes of chest pain responsive to NTG Trop collected in late afternoon results at 5700, up from <350 day prior. Cardiology re consulted. No further episodes of chest pain today but has had recurrent left arm pain. Started on hep gtt and given full dose ASA. Had dialysis yesterday, which was uneventful.

## 2025-03-02 NOTE — ASSESSMENT & PLAN NOTE
Patient with known CAD s/p stent placement, which is uncontrolled Will continue ASA, Plavix, and Statin  - Pt with Hx of Mid RCA disease s/p PCI 7/25/19   - Presented with recurrent chest pain.   - Recently admitted at [facility] with CP and underwent C; medical therapy recommended   - ECHO 2/10/25 - EF 50-55%      [facility] Cath 11/28/23     There was three vessel coronary artery disease.    The Mid LAD lesion was 90% stenosed.    The Mid RCA lesion was 100% stenosed.    The Prox LAD lesion was 75% stenosed.    The pre-procedure left ventricular end diastolic pressure was 26.    There was diastolic dysfunction.    Interval worsening of mid LAD disease. Given diffuse nature of CAD will recommend medcial therapy and HD optimization to improve left sided filling pressures    The estimated blood loss was <50 mL.     Cath 2/10/25:     Severe multivessel coronary artery disease is present    Distal left main had 30-40% calcific stenosis    Proximal LAD had 70-80% calcific stenosis and 100% stenosis in mid to distal portion.  Distal LAD was getting filled via septal collaterals    2nd diagonal artery had 90% stenosis in midportion    Mid left circumflex artery had 40% calcific stenosis.  Stenosis severity can not be determined accurately due to presence of eccentric calcification    Mid RCA had 90% InStent restenosis followed by chronic total occlusion.  Distal RCA was getting filled via right to right collaterals and left-to-right collaterals    Right common femoral arterial access was closed with a Perclose device    Low normal LVEDP (7 mmHg)    Recommendation   - Give pt diffuse disease, and as per record from IC recommendations : No culprit lesion was identified on current study and given extensive multivessel coronary artery disease which is not amenable to revascularization (either PCI or bypass surgery - transmural scar in distal LAD/left circumflex/PLB noted on PET scan done in October, 2024)  recommended maximal  medical therapy  - continue heparin gtt for now  - repeat troponin, trend to peak  - Continue ASA 81 mg qd   - Continue with Lipitor 80 mg   - Continue with Plavix  75 mg   - Continue with coreg 25 mg BID   - Continue with nitro sublingual prn   - Continue with ranolazine 500 mg qd   - Continue with Isosorbide mononitrate 120mg daily  - Pt is on adequate medical therapy.   - Follow up with cardiology clinic upon discharge for further management   - Please call cardiology fellow on call for unremitting angina

## 2025-03-02 NOTE — NURSING
Pt. C/o CP for a second time this morning. Pt rated his pain level 8/10. RN administered nitro x's 1. RN notified Dr. Maharaj.

## 2025-03-02 NOTE — ASSESSMENT & PLAN NOTE
Anemia is likely due to chronic disease due to ESRD. Most recent hemoglobin and hematocrit are listed below.  Recent Labs     02/28/25  1214 03/01/25  0535 03/02/25  0557   HGB 9.2* 9.5* 9.4*   HCT 28.9* 30.1* 30.7*       Plan  - Monitor serial CBC: Daily  - Transfuse PRBC if patient becomes hemodynamically unstable, symptomatic or H/H drops below 7/21.  - Patient has not received any PRBC transfusions to date  - Patient's anemia is currently stable

## 2025-03-02 NOTE — SUBJECTIVE & OBJECTIVE
Interval History: see above    Review of Systems  Objective:     Vital Signs (Most Recent):  Temp: 98.5 °F (36.9 °C) (03/02/25 1415)  Pulse: 99 (03/02/25 1445)  Resp: 18 (03/02/25 1430)  BP: 106/62 (03/02/25 1445)  SpO2: (!) 94 % (03/02/25 1159) Vital Signs (24h Range):  Temp:  [97.6 °F (36.4 °C)-99.3 °F (37.4 °C)] 98.5 °F (36.9 °C)  Pulse:  [] 99  Resp:  [17-18] 18  SpO2:  [85 %-94 %] 94 %  BP: (100-132)/(55-85) 106/62     Weight: 71.1 kg (156 lb 12 oz)  Body mass index is 24.55 kg/m².    Intake/Output Summary (Last 24 hours) at 3/2/2025 1506  Last data filed at 3/2/2025 0615  Gross per 24 hour   Intake 390 ml   Output 0 ml   Net 390 ml         Physical Exam  Vitals and nursing note reviewed.   Constitutional:       General: He is not in acute distress.     Appearance: He is not ill-appearing, toxic-appearing or diaphoretic.   HENT:      Head: Normocephalic.   Eyes:      General: No scleral icterus.     Pupils: Pupils are equal, round, and reactive to light.   Cardiovascular:      Rate and Rhythm: Regular rhythm. Tachycardia present.      Heart sounds: Murmur heard.      Arteriovenous access: Right arteriovenous access is present.     Comments: +thrill  Pulmonary:      Effort: No respiratory distress.      Breath sounds: Normal breath sounds. No wheezing, rhonchi or rales.   Chest:      Chest wall: No tenderness.   Abdominal:      General: There is no distension.      Palpations: Abdomen is soft.      Tenderness: There is no abdominal tenderness. There is no guarding.   Musculoskeletal:         General: Normal range of motion.      Cervical back: Normal range of motion.      Right lower leg: No edema.      Left lower leg: No edema.   Skin:     General: Skin is warm and dry.      Capillary Refill: Capillary refill takes less than 2 seconds.   Neurological:      Mental Status: He is alert and oriented to person, place, and time. Mental status is at baseline.   Psychiatric:         Mood and Affect: Mood normal.          Behavior: Behavior normal.             Significant Labs: All pertinent labs within the past 24 hours have been reviewed.  CBC:   Recent Labs   Lab 03/01/25  0535 03/02/25  0557   WBC 12.83* 13.04*   HGB 9.5* 9.4*   HCT 30.1* 30.7*    442     CMP:   Recent Labs   Lab 03/01/25  0535 03/02/25  0557    133*   K 4.2 4.7    100   CO2 20* 19*   * 111*   BUN 49* 65*   CREATININE 8.3* 10.1*   CALCIUM 9.3 9.7   PROT 7.8 8.0   ALBUMIN 3.1* 3.0*   BILITOT 0.6 0.7   ALKPHOS 91 89   AST 16 23   ALT 8* 9*   ANIONGAP 13 14     Troponin:   Recent Labs   Lab 02/28/25  1512 03/02/25  1316   TROPONINIHS 339* 5684*       Significant Imaging: I have reviewed all pertinent imaging results/findings within the past 24 hours.

## 2025-03-02 NOTE — PLAN OF CARE
Problem: Adult Inpatient Plan of Care  Goal: Plan of Care Review  Outcome: Progressing  Goal: Patient-Specific Goal (Individualized)  Outcome: Progressing  Goal: Absence of Hospital-Acquired Illness or Injury  Outcome: Progressing  Goal: Optimal Comfort and Wellbeing  Outcome: Progressing  Goal: Readiness for Transition of Care  Outcome: Progressing     Problem: Diabetes Comorbidity  Goal: Blood Glucose Level Within Targeted Range  Outcome: Progressing     Problem: Comorbidity Management  Goal: Blood Pressure in Desired Range  Outcome: Progressing  Goal: Maintenance of Heart Failure Symptom Control  Outcome: Progressing     Problem: Chest Pain  Goal: Resolution of Chest Pain Symptoms  Outcome: Progressing     Problem: Hemodialysis  Goal: Safe, Effective Therapy Delivery  Outcome: Progressing  Goal: Effective Tissue Perfusion  Outcome: Progressing  Goal: Absence of Infection Signs and Symptoms  Outcome: Progressing     Problem: Fall Injury Risk  Goal: Absence of Fall and Fall-Related Injury  Outcome: Progressing     Problem: Chronic Kidney Disease  Goal: Optimal Coping with Chronic Illness  Outcome: Progressing  Goal: Electrolyte Balance  Outcome: Progressing  Goal: Fluid Balance  Outcome: Progressing  Goal: Optimal Functional Ability  Outcome: Progressing  Goal: Absence of Anemia Signs and Symptoms  Outcome: Progressing  Goal: Optimal Oral Intake  Outcome: Progressing  Goal: Acceptable Pain Control  Outcome: Progressing  Goal: Minimize Renal Failure Effects  Outcome: Progressing     Problem: Mobility Impairment  Goal: Optimal Mobility  Outcome: Progressing     Problem: Anemia  Goal: Anemia Symptom Improvement  Outcome: Progressing

## 2025-03-02 NOTE — PROGRESS NOTES
[facility] [address] [place] [address] Medicine  Progress Note    Patient Name: Haroldo Dickinson  MRN: [id number]  Patient Class: OP- Observation   Admission Date: 2/28/2025  Length of Stay: 0 days  Attending Physician: Anjana Maharaj MD  Primary Care Provider: Mireya Larose MD        Subjective     Principal Problem:Chest pain        HPI:  Haroldo Dickinson is a 49 y.o. male with a PMHx of previous CVA, ACS, CAD, NSTEMI s/p PTCA on Plavix, ESRD on dialysis (MWF), HTN, and renal cell carcinoma s/p nephrectomy who presents to the ED with left-sided 8/10 sharp chest pain radiating to the left arm that occurred while at hemodialysis at 10 AM today. Denies associated SOB, N/V, or diaphoresis. Has has similar pain in the past and states his pain typically starts as a sharp pain in the left arm and eventually spreads to his left lower chest. The patient was given and nitroglycerin 0.4 mg SL X 1 at the HD center. The patient was then given  mg PO X 1 per EMS, which helped the pain subside. The patient then had another episode of 8/10 left-sided chest pain radiating to the left arm that occurred while in the ED which was relieved by nitroglycerin 0.4 mg SL X 1. The patient states they have not had any subsequent episodes of chest pain since the second dose of nitroglycerin. The patient takes 2 nitroglycerin a week when he has sharp left arm pain, which typically relieves his pain before it spreads to his left chest. Also notes tylenol helps as well. The patient reports being fatigued. He denies fever, chills, headache, dyspnea, peripheral edema, vomiting, diarrhea, constipation, or numbness/tingling of the extremities.     In the ED, VSS, afebrile. CBC with stable anemia. Bicarb 21. Cr 6.0 (consistent with ESRD). Glucose 139. Albumin 3.0. BNP 1,072. HS troponin 349>>333>>339. EKG shows SR with LVH, 89 bpm, no ST elevation or depression. CXR with cardiomegaly and perihilar edema in lungs. The patient received sl  nitro.    Overview/Hospital Course:  Patient with known multivessel CAD presenting with recurrent chest pain. HS trop below baseline and flat. Recently started on medical management. Cards consulted. Nephro consulted for HD. Discussed with cardiology, due to severity of disease, patient is likely to have ongoing chest pain. Okay to use NTG prn to relief. He is on max medical therapy. Lesions are not amenable to PCI and he is not a CABG candidate.     2 episodes of chest pain responsive to NTG Am 3/2. Trop collected in late afternoon results at 5700, up from <350 day prior. Cardiology re consulted. No further episodes of chest pain today but has had recurrent left arm pain. Started on hep gtt and given full dose ASA.       Interval History: see above    Review of Systems  Objective:     Vital Signs (Most Recent):  Temp: 98.5 °F (36.9 °C) (03/02/25 1415)  Pulse: 99 (03/02/25 1445)  Resp: 18 (03/02/25 1430)  BP: 106/62 (03/02/25 1445)  SpO2: (!) 94 % (03/02/25 1159) Vital Signs (24h Range):  Temp:  [97.6 °F (36.4 °C)-99.3 °F (37.4 °C)] 98.5 °F (36.9 °C)  Pulse:  [] 99  Resp:  [17-18] 18  SpO2:  [85 %-94 %] 94 %  BP: (100-132)/(55-85) 106/62     Weight: 71.1 kg (156 lb 12 oz)  Body mass index is 24.55 kg/m².    Intake/Output Summary (Last 24 hours) at 3/2/2025 1506  Last data filed at 3/2/2025 0615  Gross per 24 hour   Intake 390 ml   Output 0 ml   Net 390 ml         Physical Exam  Vitals and nursing note reviewed.   Constitutional:       General: He is not in acute distress.     Appearance: He is not ill-appearing, toxic-appearing or diaphoretic.   HENT:      Head: Normocephalic.   Eyes:      General: No scleral icterus.     Pupils: Pupils are equal, round, and reactive to light.   Cardiovascular:      Rate and Rhythm: Regular rhythm. Tachycardia present.      Heart sounds: Murmur heard.      Arteriovenous access: Right arteriovenous access is present.     Comments: +thrill  Pulmonary:      Effort: No respiratory  distress.      Breath sounds: Normal breath sounds. No wheezing, rhonchi or rales.   Chest:      Chest wall: No tenderness.   Abdominal:      General: There is no distension.      Palpations: Abdomen is soft.      Tenderness: There is no abdominal tenderness. There is no guarding.   Musculoskeletal:         General: Normal range of motion.      Cervical back: Normal range of motion.      Right lower leg: No edema.      Left lower leg: No edema.   Skin:     General: Skin is warm and dry.      Capillary Refill: Capillary refill takes less than 2 seconds.   Neurological:      Mental Status: He is alert and oriented to person, place, and time. Mental status is at baseline.   Psychiatric:         Mood and Affect: Mood normal.         Behavior: Behavior normal.             Significant Labs: All pertinent labs within the past 24 hours have been reviewed.  CBC:   Recent Labs   Lab 03/01/25  0535 03/02/25  0557   WBC 12.83* 13.04*   HGB 9.5* 9.4*   HCT 30.1* 30.7*    442     CMP:   Recent Labs   Lab 03/01/25  0535 03/02/25  0557    133*   K 4.2 4.7    100   CO2 20* 19*   * 111*   BUN 49* 65*   CREATININE 8.3* 10.1*   CALCIUM 9.3 9.7   PROT 7.8 8.0   ALBUMIN 3.1* 3.0*   BILITOT 0.6 0.7   ALKPHOS 91 89   AST 16 23   ALT 8* 9*   ANIONGAP 13 14     Troponin:   Recent Labs   Lab 02/28/25  1512 03/02/25  1316   TROPONINIHS 339* 5684*       Significant Imaging: I have reviewed all pertinent imaging results/findings within the past 24 hours.    Assessment and Plan     * Chest pain  Acute, patient reports chest pain was resolved on admit. Described as left lower chest wall pain that is sharp in nature. No associated SOB, N/V, or diaphoresis. Concern for MSK component. Will give tylenol and gabapentin. EKG without ST-segment elevation or depression, HR 89.  Troponin 349>>333>>339 (chronically elevated per chart review). BNP 1,072. CXR with cardiomegaly and perihilar edema.   Recently admitted at [facility]  with CP and underwent LHC (see below). No culprit lesion was identified. Cardiology recommended maximal medical therapy given extensive multivessel coronary artery disease which is not amenable to revascularization (either PCI or bypass surgery - transmural scar in distal LAD/left circumflex/PLB noted on PET scan done in October, 2024).  - Continue ASA, statin, plavix, BB, imdur, and ranexa.  - Cardiac monitoring.  - PRN EKG and sl nitro for chest pain  - cards consult as chest pain has recurred >> Concern for new NSTEMI > started on hep drip    -ECHO 2/10/25:    Left Ventricle: The left ventricle is mildly dilated. Mildly increased wall thickness. There is mild concentric hypertrophy. There is low normal systolic function with a visually estimated ejection fraction of 50 - 55%. Grade II diastolic dysfunction.    Right Ventricle: Normal right ventricular cavity size. Systolic function is normal.    Left Atrium: Left atrium is mildly dilated.    Aortic Valve: The aortic valve is a trileaflet valve. There is moderate aortic valve sclerosis.    Mitral Valve: There is mild to moderate regurgitation.    Tricuspid Valve: There is mild regurgitation.    -Coronary angiography 2/10/25:     Severe multivessel coronary artery disease is present    Distal left main had 30-40% calcific stenosis    Proximal LAD had 70-80% calcific stenosis and 100% stenosis in mid to distal portion.  Distal LAD was getting filled via septal collaterals    2nd diagonal artery had 90% stenosis in midportion    Mid left circumflex artery had 40% calcific stenosis.  Stenosis severity can not be determined accurately due to presence of eccentric calcification    Mid RCA had 90% InStent restenosis followed by chronic total occlusion.  Distal RCA was getting filled via right to right collaterals and left-to-right collaterals    Right common femoral arterial access was closed with a Perclose device    Low normal LVEDP (7 mmHg)    Coronary artery disease  "of native artery with stable angina pectoris  Patient with known CAD s/p stent placement, which is uncontrolled Will continue ASA, Plavix, and Statin and monitor for S/Sx of angina/ACS. Continue to monitor on telemetry.     Type 2 diabetes mellitus with chronic kidney disease on chronic dialysis, without long-term current use of insulin  Patient's FSGs are controlled with diet alone.   Last A1c reviewed-   Lab Results   Component Value Date    HGBA1C 5.1 01/08/2025     Most recent fingerstick glucose reviewed- No results for input(s): "POCTGLUCOSE" in the last 24 hours.  Current correctional scale  Low  Maintain anti-hyperglycemic dose as follows-   Antihyperglycemics (From admission, onward)      None        - Deferring SSI & CBGs given euglycemia   - Hypoglycemic protocol     PAD (peripheral artery disease)  History noted.  - Continue ASA, statin, and plavix.    Chronic diastolic heart failure  Patient is identified as having Diastolic (HFpEF) heart failure that is Acute on Chronic. CHF is currently uncontrolled due to Pulmonary edema/pleural effusion on CXR. Latest ECHO performed and demonstrates- Results for orders placed during the hospital encounter of 02/09/25    Echo    Interpretation Summary    Left Ventricle: The left ventricle is mildly dilated. Mildly increased wall thickness. There is mild concentric hypertrophy. There is low normal systolic function with a visually estimated ejection fraction of 50 - 55%. Grade II diastolic dysfunction.    Right Ventricle: Normal right ventricular cavity size. Systolic function is normal.    Left Atrium: Left atrium is mildly dilated.    Aortic Valve: The aortic valve is a trileaflet valve. There is moderate aortic valve sclerosis.    Mitral Valve: There is mild to moderate regurgitation.    Tricuspid Valve: There is mild regurgitation.  . Continue Beta Blocker Nitrate/Vasodilator and monitor clinical status closely. Monitor on telemetry. Patient is off CHF pathway.  " Monitor strict Is&Os and daily weights.  Place on fluid restriction of 1.5 L. Continue to stress to patient importance of self efficacy and  on diet for CHF. Last BNP reviewed- and noted below   Recent Labs   Lab 02/28/25  1214   BNP 1,072*     -CXR with cardiomegaly and pulmonary edema.  -No peripheral edema on exam.  -Volume management per HD.    Chronic kidney disease-mineral and bone disorder  History noted.  - Continue renvela.    Anemia in end-stage renal disease  Anemia is likely due to chronic disease due to ESRD. Most recent hemoglobin and hematocrit are listed below.  Recent Labs     02/28/25  1214 03/01/25  0535 03/02/25  0557   HGB 9.2* 9.5* 9.4*   HCT 28.9* 30.1* 30.7*       Plan  - Monitor serial CBC: Daily  - Transfuse PRBC if patient becomes hemodynamically unstable, symptomatic or H/H drops below 7/21.  - Patient has not received any PRBC transfusions to date  - Patient's anemia is currently stable    Essential hypertension  Patient's blood pressure range in the last 24 hours was: BP  Min: 100/55  Max: 132/85.The patient's inpatient anti-hypertensive regimen is listed below:  Current Antihypertensives  carvediloL tablet 25 mg, 2 times daily with meals, Oral  hydrALAZINE tablet 25 mg, Every 8 hours, Oral  NIFEdipine 24 hr tablet 90 mg, Daily, Oral  ranolazine 12 hr tablet 500 mg, Daily, Oral  minoxidiL tablet 10 mg, 2 times daily, Oral  isosorbide mononitrate 24 hr tablet 120 mg, Daily, Oral  nitroGLYCERIN SL tablet 0.4 mg, Every 5 min PRN, Sublingual  nitroGLYCERIN (NITROSTAT) SL tablet, Every 5 min PRN, Sublingual    Plan  - BP is controlled, no changes needed to their regimen    ESRD on hemodialysis  MWF schedule, dialyzed today but stopped after 1hr and 45min due to CP  Residual renal function?- No    - Nephrology consulted  - Continue chronic hemodialysis  - Monitor daily electrolytes and defer dialysis orders to nephrology  - Renally dose medications  - Renal diet  - Continue phosphorus  binders  - Daily weights/strict I&Os  - 1.5L FR     Hemiparesis affecting right side as late effect of cerebrovascular accident  History noted.  - Continue ASA, statin, and plavix.  - Fall precautions.      VTE Risk Mitigation (From admission, onward)           Ordered     heparin 25,000 units in dextrose 5% (100 units/ml) IV bolus from bag LOW INTENSITY nomogram - [facility]  As needed (PRN)        Question:  Heparin Infusion Adjustment (DO NOT MODIFY ANSWER)  Answer:  [email or web address]\Images\Pharmacy\HeparinInfusions\heparin LOW INTENSITY nomogram for [facility] [id number]    03/02/25 1456     heparin 25,000 units in dextrose 5% (100 units/ml) IV bolus from bag LOW INTENSITY nomogram - [facility]  As needed (PRN)        Question:  Heparin Infusion Adjustment (DO NOT MODIFY ANSWER)  Answer:  [email or web address]\Images\Pharmacy\HeparinInfusions\heparin LOW INTENSITY nomogram for [facility] [id number]    03/02/25 1456     heparin 25,000 units in dextrose 5% (100 units/ml) IV bolus from bag LOW INTENSITY nomogram - [facility]  Once        Question:  Heparin Infusion Adjustment (DO NOT MODIFY ANSWER)  Answer:  [email or web address]\Images\Pharmacy\HeparinInfusions\heparin LOW INTENSITY nomogram for [facility] [id number]    03/02/25 1456     heparin 25,000 units in dextrose 5% 250 mL (100 units/mL) infusion LOW INTENSITY nomogram - [facility]  Continuous        Question:  Begin at (units/kg/hr)  Answer:  12    03/02/25 1456     heparin (porcine) injection 5,000 Units  Every 8 hours         02/28/25 1906     IP VTE HIGH RISK PATIENT  Once         02/28/25 1906     Place sequential compression device  Until discontinued         02/28/25 1906                    Discharge Planning   QUIN: 3/3/2025     Code Status: Full Code   Medical Readiness for Discharge Date:                            Anjana Maharaj MD  Department of Hospital Medicine   [facility] - Observation 11H

## 2025-03-02 NOTE — ASSESSMENT & PLAN NOTE
Acute, patient reports chest pain was resolved on admit. Described as left lower chest wall pain that is sharp in nature. No associated SOB, N/V, or diaphoresis. Concern for MSK component. Will give tylenol and gabapentin. EKG without ST-segment elevation or depression, HR 89.  Troponin 349>>333>>339 (chronically elevated per chart review). BNP 1,072. CXR with cardiomegaly and perihilar edema.   Recently admitted at [facility] with CP and underwent LHC (see below). No culprit lesion was identified. Cardiology recommended maximal medical therapy given extensive multivessel coronary artery disease which is not amenable to revascularization (either PCI or bypass surgery - transmural scar in distal LAD/left circumflex/PLB noted on PET scan done in October, 2024).  - Continue ASA, statin, plavix, BB, imdur, and ranexa.  - Cardiac monitoring.  - PRN EKG and sl nitro for chest pain  - cards consult as chest pain has recurred >> Concern for new NSTEMI > started on hep drip    -ECHO 2/10/25:    Left Ventricle: The left ventricle is mildly dilated. Mildly increased wall thickness. There is mild concentric hypertrophy. There is low normal systolic function with a visually estimated ejection fraction of 50 - 55%. Grade II diastolic dysfunction.    Right Ventricle: Normal right ventricular cavity size. Systolic function is normal.    Left Atrium: Left atrium is mildly dilated.    Aortic Valve: The aortic valve is a trileaflet valve. There is moderate aortic valve sclerosis.    Mitral Valve: There is mild to moderate regurgitation.    Tricuspid Valve: There is mild regurgitation.    -Coronary angiography 2/10/25:     Severe multivessel coronary artery disease is present    Distal left main had 30-40% calcific stenosis    Proximal LAD had 70-80% calcific stenosis and 100% stenosis in mid to distal portion.  Distal LAD was getting filled via septal collaterals    2nd diagonal artery had 90% stenosis in midportion    Mid left  circumflex artery had 40% calcific stenosis.  Stenosis severity can not be determined accurately due to presence of eccentric calcification    Mid RCA had 90% InStent restenosis followed by chronic total occlusion.  Distal RCA was getting filled via right to right collaterals and left-to-right collaterals    Right common femoral arterial access was closed with a Perclose device    Low normal LVEDP (7 mmHg)

## 2025-03-02 NOTE — SUBJECTIVE & OBJECTIVE
Past Medical History:   Diagnosis Date    CAD (coronary artery disease), native coronary artery 11/21/2019    Cataract     Diabetes mellitus     Diabetic retinopathy     Dialysis patient     DM type 2 causing renal disease, not at goal     ESRD (end stage renal disease) started dialysis 01/2014 06/05/2014    H/o acute coronary syndrome with high troponin 03/11/2024    History of colon polyps 02/10/2021    History of COVID-19 12/29/2022    Hyperparathyroidism, secondary renal 06/05/2014    Hypertension     Hypertensive emergency 11/27/2023    NSTEMI (non-ST elevated myocardial infarction) 12/21/2013    Organ transplant candidate 06/05/2014    Palliative care encounter 10/29/2024    Pleural effusion 06/07/2024    Pneumonia     Post PTCA 08/26/2020    [facility] 7-25-20    Renal cell carcinoma of right kidney     Renal hypertension     Stroke        Past Surgical History:   Procedure Laterality Date    ANGIOGRAM, CORONARY, WITH LEFT HEART CATHETERIZATION N/A 7/1/2021    Procedure: Angiogram, Coronary, with Left Heart Cath;  Surgeon: Miki Zendejas MD;  Location: [facility] CATH LAB;  Service: Cardiology;  Laterality: N/A;    ANGIOGRAM, CORONARY, WITH LEFT HEART CATHETERIZATION N/A 11/28/2023    Procedure: Angiogram, Coronary, with Left Heart Cath;  Surgeon: Noah Pendleton MD;  Location: [facility] CATH LAB;  Service: Cardiology;  Laterality: N/A;    COLONOSCOPY N/A 5/3/2017    Procedure: COLONOSCOPY;  Surgeon: Rufino Carpenter MD;  Location: [address]);  Service: Endoscopy;  Laterality: N/A;  pt states can only schedule on Wednesdays    COLONOSCOPY N/A 2/9/2021    Procedure: COLONOSCOPY;  Surgeon: Edil Wong MD;  Location: [address]);  Service: Endoscopy;  Laterality: N/A;  Dialysis MWF/ labwork day of procedure  right arm aceess  per Dr. Colin pt can hold Plavix 5 days prior see note- sm  COVID test on 2/6/21 at [facility] - sm    COLONOSCOPY N/A 2/10/2021    Procedure: COLONOSCOPY;   Surgeon: Robert Ayers MD;  Location: [facility] ENDO (4TH FLR);  Service: Endoscopy;  Laterality: N/A;  rescheduled due to poor bowel prep-BB  negative covid screening 2/6/21-BB  dialysis M-W-F-BB  okay to r/s for 2/9/21 and to hold Plavix per Dr. KIRAN Wong-BB  labs same day-BB    CORONARY ANGIOGRAPHY N/A 2/10/2025    Procedure: ANGIOGRAM, CORONARY ARTERY;  Surgeon: Ajith Hutchins MD;  Location: [facility] CATH LAB;  Service: Cardiology;  Laterality: N/A;    CORONARY STENT PLACEMENT N/A 7/25/2019    Procedure: INSERTION, STENT, CORONARY ARTERY;  Surgeon: Miki Zendejas MD;  Location: [facility] CATH LAB;  Service: Cardiology;  Laterality: N/A;    DIALYSIS FISTULA CREATION      FISTULOGRAM N/A 2/18/2019    Procedure: Fistulogram;  Surgeon: Yaneth De La Cruz MD;  Location: [facility] CATH LAB;  Service: Cardiology;  Laterality: N/A;    FISTULOGRAM Right 7/23/2019    Procedure: Fistulogram;  Surgeon: Oz Cordoba MD;  Location: [facility] CATH LAB;  Service: Cardiology;  Laterality: Right;    LAPAROSCOPIC ROBOT-ASSISTED SURGICAL REMOVAL OF KIDNEY USING DA JAIME XI Right 5/19/2022    Procedure: XI ROBOTIC NEPHRECTOMY;  Surgeon: Aidan Hendricks MD;  Location: [facility] 2ND FLR;  Service: Urology;  Laterality: Right;  3hrs    LAPAROSCOPIC ROBOT-ASSISTED SURGICAL REMOVAL OF KIDNEY USING DA JAIME XI Left 1/5/2023    Procedure: XI ROBOTIC NEPHRECTOMY;  Surgeon: Aidan Hendricks MD;  [address];  Service: Urology;  Laterality: Left;  4 hrs    LEFT HEART CATHETERIZATION Left 7/25/2019    Procedure: Left heart cath;  Surgeon: Miki Zendejas MD;  Location: [facility] CATH LAB;  Service: Cardiology;  Laterality: Left;    LEFT HEART CATHETERIZATION Left 2/10/2025    Procedure: Left heart cath;  Surgeon: Ajith Hutchins MD;  Location: [facility] CATH LAB;  Service: Cardiology;  Laterality: Left;    REPAIR  1/5/2023    Procedure: REPAIR; COLOTOMY;  Surgeon: Maikel Lamb MD;  Location: [facility] 2ND FLR;  Service:  General;;    RETINAL LASER PROCEDURE Bilateral 2018 or 2017    Dr. Rothman    VASCULAR SURGERY         Review of patient's allergies indicates:   Allergen Reactions    No known allergies        Current Facility-Administered Medications on File Prior to Encounter   Medication    lidocaine (PF) 10 mg/ml (1%) injection 10 mg     Current Outpatient Medications on File Prior to Encounter   Medication Sig    atorvastatin (LIPITOR) 80 MG tablet Take 1 tablet (80 mg total) by mouth once daily.    carvediloL (COREG) 25 MG tablet Take 1 tablet (25 mg total) by mouth 2 (two) times daily with meals.    clopidogreL (PLAVIX) 75 mg tablet Take 1 tablet (75 mg total) by mouth once daily.    epoetin philip (PROCRIT) 4,000 unit/mL injection Inject 1.93 mLs (7,720 Units total) into the skin every Mon, Wed, Fri.    ezetimibe (ZETIA) 10 mg tablet Take 1 tablet (10 mg total) by mouth once daily.    hydrALAZINE (APRESOLINE) 25 MG tablet Take 1 tablet (25 mg total) by mouth every 8 (eight) hours.    isosorbide mononitrate (IMDUR) 120 MG 24 hr tablet Take 1 tablet (120 mg total) by mouth once daily.    methoxy peg-epoetin beta (MIRCERA INJ) 150 mcg.    minoxidiL (LONITEN) 10 MG Tab Take 1 tablet (10 mg total) by mouth 2 (two) times daily.    NIFEdipine (PROCARDIA-XL) 90 MG (OSM) 24 hr tablet Take 1 tablet (90 mg total) by mouth once daily.    ranolazine (RANEXA) 500 MG Tb12 Take 1 tablet (500 mg total) by mouth Daily.    sevelamer carbonate (RENVELA) 800 mg Tab Take 2 tablets (1,600 mg total) by mouth 3 (three) times daily with meals.    tenapanor (XPHOZAH) 20 mg Tab Take 1 tablet by mouth.    [DISCONTINUED] aspirin (ECOTRIN) 81 MG EC tablet Take 1 tablet (81 mg total) by mouth once daily.    [DISCONTINUED] nitroGLYCERIN (NITROSTAT) 0.4 MG SL tablet Place 1 tablet (0.4 mg total) under the tongue every 5 (five) minutes as needed for Chest pain.     Family History       Problem Relation (Age of Onset)    Cancer Maternal  "Grandfather    Cataracts Mother    Diabetes Mother    Heart disease Brother    Hypertension Mother, Father, Sister, Brother    Kidney disease Brother          Tobacco Use    Smoking status: Never    Smokeless tobacco: Never   Substance and Sexual Activity    Alcohol use: Not Currently     Comment: One drink a month    Drug use: No    Sexual activity: Yes     Partners: Female     Birth control/protection: None     ROS  Objective:     Vital Signs (Most Recent):  Temp: 98.5 °F (36.9 °C) (03/02/25 1415)  Pulse: 101 (03/02/25 1600)  Resp: 18 (03/02/25 1430)  BP: 108/68 (03/02/25 1600)  SpO2: (!) 94 % (03/02/25 1159) Vital Signs (24h Range):  Temp:  [97.6 °F (36.4 °C)-99.3 °F (37.4 °C)] 98.5 °F (36.9 °C)  Pulse:  [] 101  Resp:  [17-18] 18  SpO2:  [85 %-94 %] 94 %  BP: (100-132)/(55-85) 108/68     Weight: 71.1 kg (156 lb 12 oz)  Body mass index is 24.55 kg/m².    SpO2: (!) 94 %         Intake/Output Summary (Last 24 hours) at 3/2/2025 1610  Last data filed at 3/2/2025 0615  Gross per 24 hour   Intake 390 ml   Output 0 ml   Net 390 ml       Lines/Drains/Airways       Peripheral Intravenous Line  Duration                  Hemodialysis AV Fistula Right forearm -- days         Peripheral IV - Single Lumen 02/28/25 1213 18 G Left Forearm 2 days                     Physical Exam     Significant Labs: ABG: No results for input(s): "PH", "PCO2", "HCO3", "POCSATURATED", "BE" in the last 48 hours., CMP   Recent Labs   Lab 03/01/25  0535 03/02/25  0557    133*   K 4.2 4.7    100   CO2 20* 19*   * 111*   BUN 49* 65*   CREATININE 8.3* 10.1*   CALCIUM 9.3 9.7   PROT 7.8 8.0   ALBUMIN 3.1* 3.0*   BILITOT 0.6 0.7   ALKPHOS 91 89   AST 16 23   ALT 8* 9*   ANIONGAP 13 14   , CBC   Recent Labs   Lab 03/01/25  0535 03/02/25  0557 03/02/25  1506   WBC 12.83* 13.04* 10.50   HGB 9.5* 9.4* 8.4*   HCT 30.1* 30.7* 26.3*    442 406   , INR No results for input(s): "INR", "PROTIME" in the last 48 hours., Lipid " "Panel No results for input(s): "CHOL", "HDL", "LDLCALC", "TRIG", "CHOLHDL" in the last 48 hours., Troponin   Recent Labs   Lab 03/02/25  1316   TROPONINIHS 5684*   , and All pertinent lab results from the last 24 hours have been reviewed.    Significant Imaging: Echocardiogram: Transthoracic echo (TTE) complete (Cupid Only):   Results for orders placed or performed during the hospital encounter of 02/09/25   Echo   Result Value Ref Range    BSA 1.81 m2    LVOT stroke volume 66.0 cm3    LVIDd 5.9 3.5 - 6.0 cm    LV Systolic Volume 87.11 mL    LV Systolic Volume Index 48.4 mL/m2    LVIDs 4.4 (A) 2.1 - 4.0 cm    LV Diastolic Volume 175.72 mL    LV Diastolic Volume Index 97.62 mL/m2    Left Ventricular End Systolic Volume by Teichholz Method 87.11 mL    Left Ventricular End Diastolic Volume by Teichholz Method 175.72 mL    IVS 1.9 (A) 0.6 - 1.1 cm    LVOT diameter 1.9 cm    LVOT area 2.8 cm2    FS 25.4 (A) 28 - 44 %    Left Ventricle Relative Wall Thickness 0.61 cm    PW 1.8 (A) 0.6 - 1.1 cm    LV mass 565.8 g    LV Mass Index 314.3 g/m2    MV Peak E Gerardo 1.22 m/s    TDI LATERAL 0.08 m/s    TDI SEPTAL 0.03 m/s    E/E' ratio 22 m/s    MV Peak A Gerardo 0.85 m/s    TR Max Gerardo 3.0 m/s    E/A ratio 1.44     E wave deceleration time 139 msec    LV SEPTAL E/E' RATIO 40.7 m/s    LV LATERAL E/E' RATIO 15.3 m/s    LVOT peak gerardo 1.3 m/s    Left Ventricular Outflow Tract Mean Velocity 0.85 cm/s    Left Ventricular Outflow Tract Mean Gradient 3.40 mmHg    RV- ascencio basal diam 3.0 cm    RV S' 19.64 cm/s    TAPSE 2.26 cm    RV/LV Ratio 0.51 cm    LA size 5.0 cm    Left Atrium Major Axis 5.9 cm    RA Major Axis 4.78 cm    AV mean gradient 14 mmHg    AV peak gradient 23 mmHg    Ao peak gerardo 2.4 m/s    Ao VTI 44.0 cm    LVOT peak VTI 23.3 cm    AV valve area 1.5 cm²    AV Velocity Ratio 0.54     AV index (prosthetic) 0.53     RASTA by Velocity Ratio 1.5 cm²    MV mean gradient 4 mmHg    MV peak gradient 7 mmHg    MV stenosis pressure 1/2 time " 40.19 ms    MV valve area p 1/2 method 5.47 cm2    MV valve area by continuity eq 2.13 cm2    MV VTI 31.0 cm    Triscuspid Valve Regurgitation Peak Gradient 36 mmHg    Sinus 3.54 cm    STJ 2.54 cm    Ascending aorta 2.72 cm    Mean e' 0.06 m/s    ZLVIDS 2.83     ZLVIDD 1.70     RVDD 2.97 cm    RA Width 4.0 cm    Narrative      Left Ventricle: The left ventricle is mildly dilated. Mildly increased   wall thickness. There is mild concentric hypertrophy. There is normal   systolic function with a visually estimated ejection fraction of 60 - 65%.   Grade II diastolic dysfunction.    Right Ventricle: Normal right ventricular cavity size. Systolic   function is normal.    Left Atrium: Left atrium is mildly dilated.    Aortic Valve: The aortic valve is a trileaflet valve. There is moderate   aortic valve sclerosis.    Mitral Valve: There is mild to moderate regurgitation.    Tricuspid Valve: There is mild regurgitation.         2/10/25 LHC:    Severe multivessel coronary artery disease is present    Distal left main had 30-40% calcific stenosis    Proximal LAD had 70-80% calcific stenosis and 100% stenosis in mid to distal portion.  Distal LAD was getting filled via septal collaterals    2nd diagonal artery had 90% stenosis in midportion    Mid left circumflex artery had 40% calcific stenosis.  Stenosis severity can not be determined accurately due to presence of eccentric calcification    Mid RCA had 90% InStent restenosis followed by chronic total occlusion.  Distal RCA was getting filled via right to right collaterals and left-to-right collaterals    Right common femoral arterial access was closed with a Perclose device    Low normal LVEDP (7 mmHg)     Recommendations:  No culprit lesion was identified on current study  Given extensive multivessel coronary artery disease which is not amenable to revascularization (either PCI or bypass surgery - transmural scar in distal LAD/left circumflex/PLB noted on PET  scan done in October, 2024) would recommend maximal medical therapy

## 2025-03-02 NOTE — CONSULTS
[facility]  Cardiology  Consult Note    Patient Name: Haroldo Dickinson  MRN: [id number]  Admission Date: 2/28/2025  Hospital Length of Stay: 0 days  Code Status: Full Code   Attending Provider: Anjana Maharaj MD   Consulting Provider: Aidan Lawson MD  Primary Care Physician: Mireya Larose MD  Principal Problem:Chest pain    Patient information was obtained from patient, past medical records, and ER records.     Inpatient consult to Cardiology  Consult performed by: Aidan Lawson MD  Consult ordered by: Anjana Maharaj MD  Reason for consult: NSTEMI        Subjective:     Chief Complaint:  CP     HPI:   Mr. Dickinson is a 49 year old man with history of CVA, ACS, CAD, NSTEMI s/p PTCA on Plavix, ESRD on dialysis (MWF), HTN, and renal cell carcinoma s/p nephrectomy who presents to the ED with left-sided 8/10 sharp chest pain radiating to the left arm that occurred while at hemodialysis at 10 AM today. Denies associated SOB, N/V, or diaphoresis. Has has similar pain in the past and states his pain typically starts as a sharp pain in the left arm and eventually spreads to his left lower chest. The patient was given and nitroglycerin 0.4 mg SL X 1 at the HD center. The patient was then given  mg PO X 1 per EMS, which helped the pain subside. The patient then had another episode of 8/10 left-sided chest pain radiating to the left arm that occurred while in the ED which was relieved by nitroglycerin 0.4 mg SL X 1. The patient states they have not had any subsequent episodes of chest pain since the second dose of nitroglycerin. The patient takes 2 nitroglycerin a week when he has sharp left arm pain, which typically relieves his pain before it spreads to his left chest. Also notes tylenol helps as well. The patient reports being fatigued. He denies fever, chills, headache, dyspnea, peripheral edema, vomiting, diarrhea, constipation, or numbness/tingling of the extremities.      In the ED, VSS,  afebrile. CBC with stable anemia. Bicarb 21. Cr 6.0 (consistent with ESRD). Glucose 139. Albumin 3.0. BNP 1,072. HS troponin 349>>333>>339. EKG shows SR with LVH, 89 bpm, no ST elevation or depression. CXR with cardiomegaly and perihilar edema in lungs. The patient received sl nitro.    Cardiology consulted for recurrent chest pain and management     Hospital Course:  Cardiology consulted earlier this admission for elevated troponin and pain with dialysis. Cardiology recommended ongoing maximal medical therapy as his lesions are not amenable to PCI and he is not a CABG candidate.      The morning of 3/2 he had 2 episodes of chest pain responsive to NTG Trop collected in late afternoon results at 5700, up from <350 day prior. Cardiology re consulted. No further episodes of chest pain today at time of our conversation. Started on hep gtt and given full dose ASA. ECG with Q-waves in III and aVF    Past Medical History:   Diagnosis Date    CAD (coronary artery disease), native coronary artery 11/21/2019    Cataract     Diabetes mellitus     Diabetic retinopathy     Dialysis patient     DM type 2 causing renal disease, not at goal     ESRD (end stage renal disease) started dialysis 01/2014 06/05/2014    H/o acute coronary syndrome with high troponin 03/11/2024    History of colon polyps 02/10/2021    History of COVID-19 12/29/2022    Hyperparathyroidism, secondary renal 06/05/2014    Hypertension     Hypertensive emergency 11/27/2023    NSTEMI (non-ST elevated myocardial infarction) 12/21/2013    Organ transplant candidate 06/05/2014    Palliative care encounter 10/29/2024    Pleural effusion 06/07/2024    Pneumonia     Post PTCA 08/26/2020    RCA HILLARY 7-25-20    Renal cell carcinoma of right kidney     Renal hypertension     Stroke        Past Surgical History:   Procedure Laterality Date    ANGIOGRAM, CORONARY, WITH LEFT HEART CATHETERIZATION N/A 7/1/2021    Procedure: Angiogram, Coronary, with Left Heart Cath;   Surgeon: Miki Zendejas MD;  Location: [facility] CATH LAB;  Service: Cardiology;  Laterality: N/A;    ANGIOGRAM, CORONARY, WITH LEFT HEART CATHETERIZATION N/A 11/28/2023    Procedure: Angiogram, Coronary, with Left Heart Cath;  Surgeon: Noah Pendleton MD;  Location: [facility] CATH LAB;  Service: Cardiology;  Laterality: N/A;    COLONOSCOPY N/A 5/3/2017    Procedure: COLONOSCOPY;  Surgeon: Rufino Carpenter MD;  Location: [facility] ENDO (4TH FLR);  Service: Endoscopy;  Laterality: N/A;  pt states can only schedule on Wednesdays    COLONOSCOPY N/A 2/9/2021    Procedure: COLONOSCOPY;  Surgeon: Edil Wong MD;  [facility]  Service: Endoscopy;  Laterality: N/A;  Dialysis MWF/ labwork day of procedure  right arm aceess  per Dr. Colin pt can hold Plavix 5 days prior see note- sm  COVID test on 2/6/21 at [facility] -     COLONOSCOPY N/A 2/10/2021    Procedure: COLONOSCOPY;  Surgeon: Robert Ayers MD;  Location: [facility] ENDO (4TH FLR);  Service: Endoscopy;  Laterality: N/A;  rescheduled due to poor bowel prep-BB  negative covid screening 2/6/21-BB  dialysis M-W-F-BB  okay to r/s for 2/9/21 and to hold Plavix per Dr. KIRAN Wong-BB  labs same day-BB    CORONARY ANGIOGRAPHY N/A 2/10/2025    Procedure: ANGIOGRAM, CORONARY ARTERY;  Surgeon: Ajith Hutchins MD;  Location: [facility] CATH LAB;  Service: Cardiology;  Laterality: N/A;    CORONARY STENT PLACEMENT N/A 7/25/2019    Procedure: INSERTION, STENT, CORONARY ARTERY;  Surgeon: Miki Zendejas MD;  Location: [facility] CATH LAB;  Service: Cardiology;  Laterality: N/A;    DIALYSIS FISTULA CREATION      FISTULOGRAM N/A 2/18/2019    Procedure: Fistulogram;  Surgeon: Yaneth De La Cruz MD;  Location: [facility] CATH LAB;  Service: Cardiology;  Laterality: N/A;    FISTULOGRAM Right 7/23/2019    Procedure: Fistulogram;  Surgeon: Oz Cordoba MD;  Location: [facility] CATH LAB;  Service: Cardiology;  Laterality: Right;    LAPAROSCOPIC ROBOT-ASSISTED SURGICAL REMOVAL OF KIDNEY USING DA  JAIME XI Right 5/19/2022    Procedure: XI ROBOTIC NEPHRECTOMY;  Surgeon: Aidan Hendricks MD;  [facility];  Service: Urology;  Laterality: Right;  3hrs    LAPAROSCOPIC ROBOT-ASSISTED SURGICAL REMOVAL OF KIDNEY USING DA JAIME XI Left 1/5/2023    Procedure: XI ROBOTIC NEPHRECTOMY;  Surgeon: Aidan Hendricks MD;  Location: [facility] FLR;  Service: Urology;  Laterality: Left;  4 hrs    LEFT HEART CATHETERIZATION Left 7/25/2019    Procedure: Left heart cath;  Surgeon: Miki Zendejas MD;  Location: [facility] CATH LAB;  Service: Cardiology;  Laterality: Left;    LEFT HEART CATHETERIZATION Left 2/10/2025    Procedure: Left heart cath;  Surgeon: Ajith Hutchins MD;  Location: [facility] CATH LAB;  Service: Cardiology;  Laterality: Left;    REPAIR  1/5/2023    Procedure: REPAIR; COLOTOMY;  Surgeon: Maikel Lamb MD;  [facility];  Service: General;;    RETINAL LASER PROCEDURE Bilateral 2018 or 2017    Dr. Rothman    VASCULAR SURGERY         Review of patient's allergies indicates:   Allergen Reactions    No known allergies        Current Facility-Administered Medications on File Prior to Encounter   Medication    lidocaine (PF) 10 mg/ml (1%) injection 10 mg     Current Outpatient Medications on File Prior to Encounter   Medication Sig    atorvastatin (LIPITOR) 80 MG tablet Take 1 tablet (80 mg total) by mouth once daily.    carvediloL (COREG) 25 MG tablet Take 1 tablet (25 mg total) by mouth 2 (two) times daily with meals.    clopidogreL (PLAVIX) 75 mg tablet Take 1 tablet (75 mg total) by mouth once daily.    epoetin philip (PROCRIT) 4,000 unit/mL injection Inject 1.93 mLs (7,720 Units total) into the skin every Mon, Wed, Fri.    ezetimibe (ZETIA) 10 mg tablet Take 1 tablet (10 mg total) by mouth once daily.    hydrALAZINE (APRESOLINE) 25 MG tablet Take 1 tablet (25 mg total) by mouth every 8 (eight) hours.    isosorbide mononitrate (IMDUR) 120 MG 24 hr tablet Take 1 tablet (120 mg total) by  mouth once daily.    methoxy peg-epoetin beta (MIRCERA INJ) 150 mcg.    minoxidiL (LONITEN) 10 MG Tab Take 1 tablet (10 mg total) by mouth 2 (two) times daily.    NIFEdipine (PROCARDIA-XL) 90 MG (OSM) 24 hr tablet Take 1 tablet (90 mg total) by mouth once daily.    ranolazine (RANEXA) 500 MG Tb12 Take 1 tablet (500 mg total) by mouth Daily.    sevelamer carbonate (RENVELA) 800 mg Tab Take 2 tablets (1,600 mg total) by mouth 3 (three) times daily with meals.    tenapanor (XPHOZAH) 20 mg Tab Take 1 tablet by mouth.    [DISCONTINUED] aspirin (ECOTRIN) 81 MG EC tablet Take 1 tablet (81 mg total) by mouth once daily.    [DISCONTINUED] nitroGLYCERIN (NITROSTAT) 0.4 MG SL tablet Place 1 tablet (0.4 mg total) under the tongue every 5 (five) minutes as needed for Chest pain.     Family History       Problem Relation (Age of Onset)    Cancer Maternal Grandfather    Cataracts Mother    Diabetes Mother    Heart disease Brother    Hypertension Mother, Father, Sister, Brother    Kidney disease Brother          Tobacco Use    Smoking status: Never    Smokeless tobacco: Never   Substance and Sexual Activity    Alcohol use: Not Currently     Comment: One drink a month    Drug use: No    Sexual activity: Yes     Partners: Female     Birth control/protection: None     ROS  Objective:     Vital Signs (Most Recent):  Temp: 98.5 °F (36.9 °C) (03/02/25 1415)  Pulse: 101 (03/02/25 1600)  Resp: 18 (03/02/25 1430)  BP: 108/68 (03/02/25 1600)  SpO2: (!) 94 % (03/02/25 1159) Vital Signs (24h Range):  Temp:  [97.6 °F (36.4 °C)-99.3 °F (37.4 °C)] 98.5 °F (36.9 °C)  Pulse:  [] 101  Resp:  [17-18] 18  SpO2:  [85 %-94 %] 94 %  BP: (100-132)/(55-85) 108/68     Weight: 71.1 kg (156 lb 12 oz)  Body mass index is 24.55 kg/m².    SpO2: (!) 94 %         Intake/Output Summary (Last 24 hours) at 3/2/2025 1610  Last data filed at 3/2/2025 0615  Gross per 24 hour   Intake 390 ml   Output 0 ml   Net 390 ml       Lines/Drains/Airways       Peripheral  "Intravenous Line  Duration                  Hemodialysis AV Fistula Right forearm -- days         Peripheral IV - Single Lumen 02/28/25 1213 18 G Left Forearm 2 days                     Physical Exam     Significant Labs: ABG: No results for input(s): "PH", "PCO2", "HCO3", "POCSATURATED", "BE" in the last 48 hours., CMP   Recent Labs   Lab 03/01/25  0535 03/02/25  0557    133*   K 4.2 4.7    100   CO2 20* 19*   * 111*   BUN 49* 65*   CREATININE 8.3* 10.1*   CALCIUM 9.3 9.7   PROT 7.8 8.0   ALBUMIN 3.1* 3.0*   BILITOT 0.6 0.7   ALKPHOS 91 89   AST 16 23   ALT 8* 9*   ANIONGAP 13 14   , CBC   Recent Labs   Lab 03/01/25  0535 03/02/25  0557 03/02/25  1506   WBC 12.83* 13.04* 10.50   HGB 9.5* 9.4* 8.4*   HCT 30.1* 30.7* 26.3*    442 406   , INR No results for input(s): "INR", "PROTIME" in the last 48 hours., Lipid Panel No results for input(s): "CHOL", "HDL", "LDLCALC", "TRIG", "CHOLHDL" in the last 48 hours., Troponin   Recent Labs   Lab 03/02/25  1316   TROPONINIHS 5684*   , and All pertinent lab results from the last 24 hours have been reviewed.    Significant Imaging: Echocardiogram: Transthoracic echo (TTE) complete (Cupid Only):   Results for orders placed or performed during the hospital encounter of 02/09/25   Echo   Result Value Ref Range    BSA 1.81 m2    LVOT stroke volume 66.0 cm3    LVIDd 5.9 3.5 - 6.0 cm    LV Systolic Volume 87.11 mL    LV Systolic Volume Index 48.4 mL/m2    LVIDs 4.4 (A) 2.1 - 4.0 cm    LV Diastolic Volume 175.72 mL    LV Diastolic Volume Index 97.62 mL/m2    Left Ventricular End Systolic Volume by Teichholz Method 87.11 mL    Left Ventricular End Diastolic Volume by Teichholz Method 175.72 mL    IVS 1.9 (A) 0.6 - 1.1 cm    LVOT diameter 1.9 cm    LVOT area 2.8 cm2    FS 25.4 (A) 28 - 44 %    Left Ventricle Relative Wall Thickness 0.61 cm    PW 1.8 (A) 0.6 - 1.1 cm    LV mass 565.8 g    LV Mass Index 314.3 g/m2    MV Peak E Gerardo 1.22 m/s    TDI LATERAL 0.08 m/s "    TDI SEPTAL 0.03 m/s    E/E' ratio 22 m/s    MV Peak A Gerardo 0.85 m/s    TR Max Gerardo 3.0 m/s    E/A ratio 1.44     E wave deceleration time 139 msec    LV SEPTAL E/E' RATIO 40.7 m/s    LV LATERAL E/E' RATIO 15.3 m/s    LVOT peak gerardo 1.3 m/s    Left Ventricular Outflow Tract Mean Velocity 0.85 cm/s    Left Ventricular Outflow Tract Mean Gradient 3.40 mmHg    RV- ascencio basal diam 3.0 cm    RV S' 19.64 cm/s    TAPSE 2.26 cm    RV/LV Ratio 0.51 cm    LA size 5.0 cm    Left Atrium Major Axis 5.9 cm    RA Major Axis 4.78 cm    AV mean gradient 14 mmHg    AV peak gradient 23 mmHg    Ao peak gerardo 2.4 m/s    Ao VTI 44.0 cm    LVOT peak VTI 23.3 cm    AV valve area 1.5 cm²    AV Velocity Ratio 0.54     AV index (prosthetic) 0.53     RASTA by Velocity Ratio 1.5 cm²    MV mean gradient 4 mmHg    MV peak gradient 7 mmHg    MV stenosis pressure 1/2 time 40.19 ms    MV valve area p 1/2 method 5.47 cm2    MV valve area by continuity eq 2.13 cm2    MV VTI 31.0 cm    Triscuspid Valve Regurgitation Peak Gradient 36 mmHg    Sinus 3.54 cm    STJ 2.54 cm    Ascending aorta 2.72 cm    Mean e' 0.06 m/s    ZLVIDS 2.83     ZLVIDD 1.70     RVDD 2.97 cm    RA Width 4.0 cm    Narrative      Left Ventricle: The left ventricle is mildly dilated. Mildly increased   wall thickness. There is mild concentric hypertrophy. There is normal   systolic function with a visually estimated ejection fraction of 60 - 65%.   Grade II diastolic dysfunction.    Right Ventricle: Normal right ventricular cavity size. Systolic   function is normal.    Left Atrium: Left atrium is mildly dilated.    Aortic Valve: The aortic valve is a trileaflet valve. There is moderate   aortic valve sclerosis.    Mitral Valve: There is mild to moderate regurgitation.    Tricuspid Valve: There is mild regurgitation.         2/10/25 LHC:    Severe multivessel coronary artery disease is present    Distal left main had 30-40% calcific stenosis    Proximal LAD had 70-80% calcific stenosis and  100% stenosis in mid to distal portion.  Distal LAD was getting filled via septal collaterals    2nd diagonal artery had 90% stenosis in midportion    Mid left circumflex artery had 40% calcific stenosis.  Stenosis severity can not be determined accurately due to presence of eccentric calcification    Mid RCA had 90% InStent restenosis followed by chronic total occlusion.  Distal RCA was getting filled via right to right collaterals and left-to-right collaterals    Right common femoral arterial access was closed with a Perclose device    Low normal LVEDP (7 mmHg)     Recommendations:  No culprit lesion was identified on current study  Given extensive multivessel coronary artery disease which is not amenable to revascularization (either PCI or bypass surgery - transmural scar in distal LAD/left circumflex/PLB noted on PET scan done in October, 2024) would recommend maximal medical therapy  Assessment and Plan:     NSTEMI (non-ST elevated myocardial infarction)  Patient with known CAD s/p stent placement, which is uncontrolled Will continue ASA, Plavix, and Statin  - Pt with Hx of Mid RCA disease s/p PCI 7/25/19   - Presented with recurrent chest pain.   - Recently admitted at [facility] with CP and underwent C; medical therapy recommended   - ECHO 2/10/25 - EF 50-55%      C Cath 11/28/23     There was three vessel coronary artery disease.    The Mid LAD lesion was 90% stenosed.    The Mid RCA lesion was 100% stenosed.    The Prox LAD lesion was 75% stenosed.    The pre-procedure left ventricular end diastolic pressure was 26.    There was diastolic dysfunction.    Interval worsening of mid LAD disease. Given diffuse nature of CAD will recommend medcial therapy and HD optimization to improve left sided filling pressures    The estimated blood loss was <50 mL.     Cath 2/10/25:     Severe multivessel coronary artery disease is present    Distal left main had 30-40% calcific stenosis    Proximal LAD had 70-80%  calcific stenosis and 100% stenosis in mid to distal portion.  Distal LAD was getting filled via septal collaterals    2nd diagonal artery had 90% stenosis in midportion    Mid left circumflex artery had 40% calcific stenosis.  Stenosis severity can not be determined accurately due to presence of eccentric calcification    Mid RCA had 90% InStent restenosis followed by chronic total occlusion.  Distal RCA was getting filled via right to right collaterals and left-to-right collaterals    Right common femoral arterial access was closed with a Perclose device    Low normal LVEDP (7 mmHg)    Recommendation   - Give pt diffuse disease, and as per record from [facility] recommendations [facility]: No culprit lesion was identified on current study and given extensive multivessel coronary artery disease which is not amenable to revascularization (either PCI or bypass surgery - transmural scar in distal LAD/left circumflex/PLB noted on PET scan done in October, 2024)  recommended maximal medical therapy  - continue heparin gtt for now  - repeat troponin, trend to peak  - Continue ASA 81 mg qd   - Continue with Lipitor 80 mg   - Continue with Plavix  75 mg   - Continue with coreg 25 mg BID   - Continue with nitro sublingual prn   - Continue with ranolazine 500 mg qd   - Continue with Isosorbide mononitrate 120mg daily  - Pt is on adequate medical therapy.   - Follow up with cardiology clinic upon discharge for further management   - Please call cardiology fellow on call for unremitting angina    Chronic diastolic heart failure  Lasted ECHO: 2/10/25  estimated ejection fraction of 50 - 55%. Grade II diastolic dysfunction.  - CXR notable for pulmonary edema and pleural effusion   - Pt with ESRD on HD.   - No peripheral edema on exam.    Recommendations   - Volume management per HD  - Continue with Hydralazine 25 mg q 8   - Recommend 2 gram sodium restriction and 1500cc fluid restriction.  - Encourage physical activity with graded exercise  program.  - Monitor fluid intake         VTE Risk Mitigation (From admission, onward)           Ordered     heparin 25,000 units in dextrose 5% (100 units/ml) IV bolus from bag LOW INTENSITY nomogram - [facility]  As needed (PRN)        Question:  Heparin Infusion Adjustment (DO NOT MODIFY ANSWER)  Answer:  [email or web address]\Images\Pharmacy\HeparinInfusions\heparin LOW INTENSITY nomogram for [facility] [id number]    03/02/25 1456     heparin 25,000 units in dextrose 5% (100 units/ml) IV bolus from bag LOW INTENSITY nomogram - [facility]  As needed (PRN)        Question:  Heparin Infusion Adjustment (DO NOT MODIFY ANSWER)  Answer:  [email or web address]\Images\Pharmacy\HeparinInfusions\heparin LOW INTENSITY nomogram for [facility] [id number]    03/02/25 1456     heparin 25,000 units in dextrose 5% (100 units/ml) IV bolus from bag LOW INTENSITY nomogram - [facility]  Once        Question:  Heparin Infusion Adjustment (DO NOT MODIFY ANSWER)  Answer:  [email or web address]\Images\Pharmacy\HeparinInfusions\heparin LOW INTENSITY nomogram for [facility] [id number]    03/02/25 1456     heparin 25,000 units in dextrose 5% 250 mL (100 units/mL) infusion LOW INTENSITY nomogram - [facility]  Continuous        Question:  Begin at (units/kg/hr)  Answer:  12    03/02/25 1456     heparin (porcine) injection 5,000 Units  Every 8 hours         02/28/25 1906     IP VTE HIGH RISK PATIENT  Once         02/28/25 1906     Place sequential compression device  Until discontinued         02/28/25 1906                    Thank you for your consult. I will follow-up with patient. Please contact us if you have any additional questions.    Aidan Lawson MD  Cardiology   [facility]

## 2025-03-03 LAB
ALBUMIN SERPL BCP-MCNC: 2.7 G/DL (ref 3.5–5.2)
ALP SERPL-CCNC: 77 U/L (ref 40–150)
ALT SERPL W/O P-5'-P-CCNC: 7 U/L (ref 10–44)
ANION GAP SERPL CALC-SCNC: 11 MMOL/L (ref 8–16)
APTT PPP: 33.7 SEC (ref 21–32)
APTT PPP: 33.9 SEC (ref 21–32)
APTT PPP: 34.7 SEC (ref 21–32)
APTT PPP: 43 SEC (ref 21–32)
AST SERPL-CCNC: 20 U/L (ref 10–40)
BASOPHILS # BLD AUTO: 0.14 K/UL (ref 0–0.2)
BASOPHILS # BLD AUTO: 0.14 K/UL (ref 0–0.2)
BASOPHILS NFR BLD: 1.2 % (ref 0–1.9)
BASOPHILS NFR BLD: 1.2 % (ref 0–1.9)
BILIRUB SERPL-MCNC: 0.7 MG/DL (ref 0.1–1)
BUN SERPL-MCNC: 43 MG/DL (ref 6–20)
CALCIUM SERPL-MCNC: 9.1 MG/DL (ref 8.7–10.5)
CHLORIDE SERPL-SCNC: 98 MMOL/L (ref 95–110)
CO2 SERPL-SCNC: 23 MMOL/L (ref 23–29)
CREAT SERPL-MCNC: 7.5 MG/DL (ref 0.5–1.4)
DIFFERENTIAL METHOD BLD: ABNORMAL
DIFFERENTIAL METHOD BLD: ABNORMAL
EOSINOPHIL # BLD AUTO: 0.7 K/UL (ref 0–0.5)
EOSINOPHIL # BLD AUTO: 0.7 K/UL (ref 0–0.5)
EOSINOPHIL NFR BLD: 5.6 % (ref 0–8)
EOSINOPHIL NFR BLD: 5.6 % (ref 0–8)
ERYTHROCYTE [DISTWIDTH] IN BLOOD BY AUTOMATED COUNT: 16.5 % (ref 11.5–14.5)
ERYTHROCYTE [DISTWIDTH] IN BLOOD BY AUTOMATED COUNT: 16.5 % (ref 11.5–14.5)
EST. GFR  (NO RACE VARIABLE): 8.3 ML/MIN/1.73 M^2
GLUCOSE SERPL-MCNC: 122 MG/DL (ref 70–110)
HCT VFR BLD AUTO: 26.9 % (ref 40–54)
HCT VFR BLD AUTO: 26.9 % (ref 40–54)
HGB BLD-MCNC: 8.5 G/DL (ref 14–18)
HGB BLD-MCNC: 8.5 G/DL (ref 14–18)
IMM GRANULOCYTES # BLD AUTO: 0.07 K/UL (ref 0–0.04)
IMM GRANULOCYTES # BLD AUTO: 0.07 K/UL (ref 0–0.04)
IMM GRANULOCYTES NFR BLD AUTO: 0.6 % (ref 0–0.5)
IMM GRANULOCYTES NFR BLD AUTO: 0.6 % (ref 0–0.5)
LYMPHOCYTES # BLD AUTO: 1.5 K/UL (ref 1–4.8)
LYMPHOCYTES # BLD AUTO: 1.5 K/UL (ref 1–4.8)
LYMPHOCYTES NFR BLD: 12.7 % (ref 18–48)
LYMPHOCYTES NFR BLD: 12.7 % (ref 18–48)
MAGNESIUM SERPL-MCNC: 1.8 MG/DL (ref 1.6–2.6)
MCH RBC QN AUTO: 29.5 PG (ref 27–31)
MCH RBC QN AUTO: 29.5 PG (ref 27–31)
MCHC RBC AUTO-ENTMCNC: 31.6 G/DL (ref 32–36)
MCHC RBC AUTO-ENTMCNC: 31.6 G/DL (ref 32–36)
MCV RBC AUTO: 93 FL (ref 82–98)
MCV RBC AUTO: 93 FL (ref 82–98)
MONOCYTES # BLD AUTO: 1.3 K/UL (ref 0.3–1)
MONOCYTES # BLD AUTO: 1.3 K/UL (ref 0.3–1)
MONOCYTES NFR BLD: 11.2 % (ref 4–15)
MONOCYTES NFR BLD: 11.2 % (ref 4–15)
NEUTROPHILS # BLD AUTO: 8.2 K/UL (ref 1.8–7.7)
NEUTROPHILS # BLD AUTO: 8.2 K/UL (ref 1.8–7.7)
NEUTROPHILS NFR BLD: 68.7 % (ref 38–73)
NEUTROPHILS NFR BLD: 68.7 % (ref 38–73)
NRBC BLD-RTO: 0 /100 WBC
NRBC BLD-RTO: 0 /100 WBC
OHS QRS DURATION: 114 MS
OHS QTC CALCULATION: 469 MS
PHOSPHATE SERPL-MCNC: 3.9 MG/DL (ref 2.7–4.5)
PLATELET # BLD AUTO: 401 K/UL (ref 150–450)
PLATELET # BLD AUTO: 401 K/UL (ref 150–450)
PMV BLD AUTO: 9.9 FL (ref 9.2–12.9)
PMV BLD AUTO: 9.9 FL (ref 9.2–12.9)
POCT GLUCOSE: 124 MG/DL (ref 70–110)
POTASSIUM SERPL-SCNC: 4 MMOL/L (ref 3.5–5.1)
PROT SERPL-MCNC: 7.3 G/DL (ref 6–8.4)
RBC # BLD AUTO: 2.88 M/UL (ref 4.6–6.2)
RBC # BLD AUTO: 2.88 M/UL (ref 4.6–6.2)
SODIUM SERPL-SCNC: 132 MMOL/L (ref 136–145)
TROPONIN I SERPL DL<=0.01 NG/ML-MCNC: 7124 NG/L (ref 0–35)
TROPONIN I SERPL DL<=0.01 NG/ML-MCNC: 7227 NG/L (ref 0–35)
WBC # BLD AUTO: 11.86 K/UL (ref 3.9–12.7)
WBC # BLD AUTO: 11.86 K/UL (ref 3.9–12.7)

## 2025-03-03 PROCEDURE — 36415 COLL VENOUS BLD VENIPUNCTURE: CPT | Mod: HCNC | Performed by: STUDENT IN AN ORGANIZED HEALTH CARE EDUCATION/TRAINING PROGRAM

## 2025-03-03 PROCEDURE — 63600175 PHARM REV CODE 636 W HCPCS: Mod: HCNC | Performed by: STUDENT IN AN ORGANIZED HEALTH CARE EDUCATION/TRAINING PROGRAM

## 2025-03-03 PROCEDURE — 85730 THROMBOPLASTIN TIME PARTIAL: CPT | Mod: HCNC | Performed by: STUDENT IN AN ORGANIZED HEALTH CARE EDUCATION/TRAINING PROGRAM

## 2025-03-03 PROCEDURE — 83735 ASSAY OF MAGNESIUM: CPT | Mod: HCNC | Performed by: NURSE PRACTITIONER

## 2025-03-03 PROCEDURE — 80053 COMPREHEN METABOLIC PANEL: CPT | Mod: HCNC | Performed by: NURSE PRACTITIONER

## 2025-03-03 PROCEDURE — 21400001 HC TELEMETRY ROOM: Mod: HCNC

## 2025-03-03 PROCEDURE — 84100 ASSAY OF PHOSPHORUS: CPT | Mod: HCNC | Performed by: NURSE PRACTITIONER

## 2025-03-03 PROCEDURE — 25000003 PHARM REV CODE 250: Mod: HCNC | Performed by: NURSE PRACTITIONER

## 2025-03-03 PROCEDURE — 85025 COMPLETE CBC W/AUTO DIFF WBC: CPT | Mod: HCNC | Performed by: NURSE PRACTITIONER

## 2025-03-03 PROCEDURE — 84484 ASSAY OF TROPONIN QUANT: CPT | Mod: HCNC | Performed by: STUDENT IN AN ORGANIZED HEALTH CARE EDUCATION/TRAINING PROGRAM

## 2025-03-03 PROCEDURE — 36415 COLL VENOUS BLD VENIPUNCTURE: CPT | Mod: HCNC | Performed by: NURSE PRACTITIONER

## 2025-03-03 RX ADMIN — ATORVASTATIN CALCIUM 80 MG: 40 TABLET, FILM COATED ORAL at 08:03

## 2025-03-03 RX ADMIN — NIFEDIPINE 90 MG: 60 TABLET, FILM COATED, EXTENDED RELEASE ORAL at 10:03

## 2025-03-03 RX ADMIN — RANOLAZINE 500 MG: 500 TABLET, EXTENDED RELEASE ORAL at 05:03

## 2025-03-03 RX ADMIN — HEPARIN SODIUM AND DEXTROSE 18 UNITS/KG/HR: 10000; 5 INJECTION INTRAVENOUS at 04:03

## 2025-03-03 RX ADMIN — SEVELAMER CARBONATE 1600 MG: 800 TABLET, FILM COATED ORAL at 07:03

## 2025-03-03 RX ADMIN — EZETIMIBE 10 MG: 10 TABLET ORAL at 08:03

## 2025-03-03 RX ADMIN — SEVELAMER CARBONATE 1600 MG: 800 TABLET, FILM COATED ORAL at 12:03

## 2025-03-03 RX ADMIN — ASPIRIN 81 MG: 81 TABLET, COATED ORAL at 08:03

## 2025-03-03 RX ADMIN — ISOSORBIDE MONONITRATE 120 MG: 60 TABLET, EXTENDED RELEASE ORAL at 08:03

## 2025-03-03 RX ADMIN — SEVELAMER CARBONATE 1600 MG: 800 TABLET, FILM COATED ORAL at 04:03

## 2025-03-03 RX ADMIN — CARVEDILOL 25 MG: 25 TABLET, FILM COATED ORAL at 08:03

## 2025-03-03 RX ADMIN — CLOPIDOGREL BISULFATE 75 MG: 75 TABLET ORAL at 08:03

## 2025-03-03 NOTE — ASSESSMENT & PLAN NOTE
Acute, patient reports chest pain was resolved on admit. Described as left lower chest wall pain that is sharp in nature. No associated SOB, N/V, or diaphoresis. Concern for MSK component. Will give tylenol and gabapentin. EKG without ST-segment elevation or depression, HR 89.  Troponin 349>>333>>339 (chronically elevated per chart review). BNP 1,072. CXR with cardiomegaly and perihilar edema.   Recently admitted at [facility] with CP and underwent LHC (see below). No culprit lesion was identified. Cardiology recommended maximal medical therapy given extensive multivessel coronary artery disease which is not amenable to revascularization (either PCI or bypass surgery - transmural scar in distal LAD/left circumflex/PLB noted on PET scan done in October, 2024).  - Continue ASA, statin, plavix, BB, imdur, and ranexa.  - Cardiac monitoring.  - PRN EKG and sl nitro for chest pain  - cards consult as chest pain has recurred >> Concern for new NSTEMI > started on hep drip afternoon 3/2    -ECHO 2/10/25:    Left Ventricle: The left ventricle is mildly dilated. Mildly increased wall thickness. There is mild concentric hypertrophy. There is low normal systolic function with a visually estimated ejection fraction of 50 - 55%. Grade II diastolic dysfunction.    Right Ventricle: Normal right ventricular cavity size. Systolic function is normal.    Left Atrium: Left atrium is mildly dilated.    Aortic Valve: The aortic valve is a trileaflet valve. There is moderate aortic valve sclerosis.    Mitral Valve: There is mild to moderate regurgitation.    Tricuspid Valve: There is mild regurgitation.    -Coronary angiography 2/10/25:     Severe multivessel coronary artery disease is present    Distal left main had 30-40% calcific stenosis    Proximal LAD had 70-80% calcific stenosis and 100% stenosis in mid to distal portion.  Distal LAD was getting filled via septal collaterals    2nd diagonal artery had 90% stenosis in midportion     Mid left circumflex artery had 40% calcific stenosis.  Stenosis severity can not be determined accurately due to presence of eccentric calcification    Mid RCA had 90% InStent restenosis followed by chronic total occlusion.  Distal RCA was getting filled via right to right collaterals and left-to-right collaterals    Right common femoral arterial access was closed with a Perclose device    Low normal LVEDP (7 mmHg)

## 2025-03-03 NOTE — ASSESSMENT & PLAN NOTE
Patient's blood pressure range in the last 24 hours was: BP  Min: 92/53  Max: 117/60.The patient's inpatient anti-hypertensive regimen is listed below:  Current Antihypertensives  carvediloL tablet 25 mg, 2 times daily with meals, Oral  hydrALAZINE tablet 25 mg, Every 8 hours, Oral  NIFEdipine 24 hr tablet 90 mg, Daily, Oral  ranolazine 12 hr tablet 500 mg, Daily, Oral  minoxidiL tablet 10 mg, 2 times daily, Oral  isosorbide mononitrate 24 hr tablet 120 mg, Daily, Oral  nitroGLYCERIN SL tablet 0.4 mg, Every 5 min PRN, Sublingual  nitroGLYCERIN (NITROSTAT) SL tablet, Every 5 min PRN, Sublingual    Plan  - BP is controlled, no changes needed to their regimen

## 2025-03-03 NOTE — SUBJECTIVE & OBJECTIVE
Interval History; No overnight events, patient reports chest pain is resolved, completed HD yesterday.       Objective:     Vital Signs (Most Recent):  Temp: 98.3 °F (36.8 °C) (03/03/25 0752)  Pulse: 94 (03/03/25 0752)  Resp: 20 (03/03/25 0752)  BP: 117/60 (03/03/25 0752)  SpO2: 95 % (03/03/25 0752) Vital Signs (24h Range):  Temp:  [97.8 °F (36.6 °C)-98.6 °F (37 °C)] 98.3 °F (36.8 °C)  Pulse:  [] 94  Resp:  [17-20] 20  SpO2:  [90 %-96 %] 95 %  BP: ()/(51-68) 117/60     Weight: 71.1 kg (156 lb 12 oz) (03/02/25 0615)  Body mass index is 24.55 kg/m².  Body surface area is 1.83 meters squared.    I/O last 3 completed shifts:  In: 411.3 [P.O.:390; IV Piggyback:21.3]  Out: 1600 [Other:1600]     Physical Exam  Vitals and nursing note reviewed.   Constitutional:       General: He is not in acute distress.     Appearance: He is not ill-appearing, toxic-appearing or diaphoretic.   HENT:      Head: Normocephalic and atraumatic.      Nose: Nose normal.      Mouth/Throat:      Mouth: Mucous membranes are moist.   Eyes:      Pupils: Pupils are equal, round, and reactive to light.   Cardiovascular:      Rate and Rhythm: Normal rate and regular rhythm.      Pulses: Normal pulses.      Heart sounds: Murmur heard.      Arteriovenous access: Right arteriovenous access is present.     Comments: +thrill  Pulmonary:      Effort: Pulmonary effort is normal. No respiratory distress.      Breath sounds: No wheezing, rhonchi or rales.   Chest:      Chest wall: No tenderness.   Abdominal:      General: Bowel sounds are normal. There is no distension.      Palpations: Abdomen is soft.      Tenderness: There is no abdominal tenderness. There is no guarding.   Musculoskeletal:         General: Normal range of motion.      Cervical back: Normal range of motion.      Right lower leg: No edema.      Left lower leg: No edema.   Skin:     General: Skin is warm and dry.      Capillary Refill: Capillary refill takes less than 2 seconds.    Neurological:      General: No focal deficit present.      Mental Status: He is alert and oriented to person, place, and time.      Sensory: No sensory deficit.      Motor: No weakness.   Psychiatric:         Mood and Affect: Mood normal.         Behavior: Behavior normal.          Significant Labs:  CBC:   Recent Labs   Lab 03/03/25  0432   WBC 11.86  11.86   RBC 2.88*  2.88*   HGB 8.5*  8.5*   HCT 26.9*  26.9*     401   MCV 93  93   MCH 29.5  29.5   MCHC 31.6*  31.6*     CMP:   Recent Labs   Lab 03/03/25  0432   *   CALCIUM 9.1   ALBUMIN 2.7*   PROT 7.3   *   K 4.0   CO2 23   CL 98   BUN 43*   CREATININE 7.5*   ALKPHOS 77   ALT 7*   AST 20   BILITOT 0.7

## 2025-03-03 NOTE — ASSESSMENT & PLAN NOTE
[facility]HD [facility]  [facility]  [facility]  EDW:  69.5 kg  RFA AVF    Plan/Recommendations:  -Completed HD yesterday evening, planning for next session on Wednesday.   -high risk of mortality due to comorbid conditions

## 2025-03-03 NOTE — PROGRESS NOTES
[facility]  Nephrology  Progress Note    Patient Name: Haroldo Dickinson  MRN: [id number]  Admission Date: 2/28/2025  Hospital Length of Stay: 1 days  Attending Provider: Anjana Maharaj MD   Primary Care Physician: Mireya Larose MD  Principal Problem:NSTEMI (non-ST elevated myocardial infarction)    Subjective:     HPI: Haroldo Dickinson is a 49 y.o. male with a previous CVA, ACS, CAD, NSTEMI s/p PTCA on Plavix, ESRD on dialysis (MWF), HTN, and renal cell carcinoma s/p nephrectomy who presents to the ED with left-sided 8/10 sharp chest pain radiating to the left arm that occurred while at hemodialysis. Denies associated SOB, N/V, or diaphoresis. Has has similar pain in the past and states his pain typically starts as a sharp pain in the left arm and eventually spreads to his left lower chest. The patient was given and nitroglycerin 0.4 mg SL X 1 at the HD center. The patient was then given  mg PO X 1 per EMS, which helped the pain subside. The patient then had another episode of 8/10 left-sided chest pain radiating to the left arm that occurred while in the ED which was relieved by nitroglycerin 0.4 mg SL X 1. The patient states they have not had any subsequent episodes of chest pain since the second dose of nitroglycerin. The patient takes 2 nitroglycerin a week when he has sharp left arm pain, which typically relieves his pain before it spreads to his left chest. Also notes tylenol helps as well. In the ED, VSS, afebrile. . BNP 1,072. HS troponin 349>>333>>339.  CXR with cardiomegaly and mild edema.  Nephrology was consulted for ESRD management while IP.    He has extensive cardiac hx, ecently admitted at [facility] with CP and underwent LHC. No culprit lesion was identified. Cardiology recommended maximal medical therapy given extensive multivessel coronary artery disease which is not amenable to revascularization (either PCI or bypass surgery - transmural scar in distal LAD/left circumflex/PLB  noted on PET scan done in October, 2024).     Interval History; No overnight events, patient reports chest pain is resolved, completed HD yesterday.       Objective:     Vital Signs (Most Recent):  Temp: 98.3 °F (36.8 °C) (03/03/25 0752)  Pulse: 94 (03/03/25 0752)  Resp: 20 (03/03/25 0752)  BP: 117/60 (03/03/25 0752)  SpO2: 95 % (03/03/25 0752) Vital Signs (24h Range):  Temp:  [97.8 °F (36.6 °C)-98.6 °F (37 °C)] 98.3 °F (36.8 °C)  Pulse:  [] 94  Resp:  [17-20] 20  SpO2:  [90 %-96 %] 95 %  BP: ()/(51-68) 117/60     Weight: 71.1 kg (156 lb 12 oz) (03/02/25 0615)  Body mass index is 24.55 kg/m².  Body surface area is 1.83 meters squared.    I/O last 3 completed shifts:  In: 411.3 [P.O.:390; IV Piggyback:21.3]  Out: 1600 [Other:1600]     Physical Exam  Vitals and nursing note reviewed.   Constitutional:       General: He is not in acute distress.     Appearance: He is not ill-appearing, toxic-appearing or diaphoretic.   HENT:      Head: Normocephalic and atraumatic.      Nose: Nose normal.      Mouth/Throat:      Mouth: Mucous membranes are moist.   Eyes:      Pupils: Pupils are equal, round, and reactive to light.   Cardiovascular:      Rate and Rhythm: Normal rate and regular rhythm.      Pulses: Normal pulses.      Heart sounds: Murmur heard.      Arteriovenous access: Right arteriovenous access is present.     Comments: +thrill  Pulmonary:      Effort: Pulmonary effort is normal. No respiratory distress.      Breath sounds: No wheezing, rhonchi or rales.   Chest:      Chest wall: No tenderness.   Abdominal:      General: Bowel sounds are normal. There is no distension.      Palpations: Abdomen is soft.      Tenderness: There is no abdominal tenderness. There is no guarding.   Musculoskeletal:         General: Normal range of motion.      Cervical back: Normal range of motion.      Right lower leg: No edema.      Left lower leg: No edema.   Skin:     General: Skin is warm and dry.      Capillary Refill:  Capillary refill takes less than 2 seconds.   Neurological:      General: No focal deficit present.      Mental Status: He is alert and oriented to person, place, and time.      Sensory: No sensory deficit.      Motor: No weakness.   Psychiatric:         Mood and Affect: Mood normal.         Behavior: Behavior normal.          Significant Labs:  CBC:   Recent Labs   Lab 03/03/25  0432   WBC 11.86  11.86   RBC 2.88*  2.88*   HGB 8.5*  8.5*   HCT 26.9*  26.9*     401   MCV 93  93   MCH 29.5  29.5   MCHC 31.6*  31.6*     CMP:   Recent Labs   Lab 03/03/25  0432   *   CALCIUM 9.1   ALBUMIN 2.7*   PROT 7.3   *   K 4.0   CO2 23   CL 98   BUN 43*   CREATININE 7.5*   ALKPHOS 77   ALT 7*   AST 20   BILITOT 0.7       Assessment/Plan:     Renal/  ESRD on hemodialysis  ESRD on iHD MWF  [facility]  4 hours  EDW:  69.5 kg  RFA AVF    Plan/Recommendations:  -Completed HD yesterday evening, planning for next session on Wednesday.   -high risk of mortality due to comorbid conditions        Thank you for your consult. I will follow-up with patient. Please contact us if you have any additional questions.    Faisal Roberson MD  Nephrology  [facility]

## 2025-03-03 NOTE — PROGRESS NOTES
Dialysis completed. Sparks removed from LFA fistula with manual pressure held for 7 minutes each with hemostasis achieved. Gauze dressing applied. Dialyzed for 3 hours with fluid removal of 1.2 liters. Stable vital signs.Report given to primary care nurse.

## 2025-03-03 NOTE — PROGRESS NOTES
[facility] [address] [place] [address] Medicine  Progress Note    Patient Name: Haroldo Dickinson  MRN: [id number]  Patient Class: IP- Inpatient   Admission Date: 2/28/2025  Length of Stay: 1 days  Attending Physician: Anjana Maharaj MD  Primary Care Provider: Mireya Larose MD        Subjective     Principal Problem:NSTEMI (non-ST elevated myocardial infarction)        HPI:  Haroldo Dickinson is a 49 y.o. male with a PMHx of previous CVA, ACS, CAD, NSTEMI s/p PTCA on Plavix, ESRD on dialysis (MWF), HTN, and renal cell carcinoma s/p nephrectomy who presents to the ED with left-sided 8/10 sharp chest pain radiating to the left arm that occurred while at hemodialysis at 10 AM today. Denies associated SOB, N/V, or diaphoresis. Has has similar pain in the past and states his pain typically starts as a sharp pain in the left arm and eventually spreads to his left lower chest. The patient was given and nitroglycerin 0.4 mg SL X 1 at the HD center. The patient was then given  mg PO X 1 per EMS, which helped the pain subside. The patient then had another episode of 8/10 left-sided chest pain radiating to the left arm that occurred while in the ED which was relieved by nitroglycerin 0.4 mg SL X 1. The patient states they have not had any subsequent episodes of chest pain since the second dose of nitroglycerin. The patient takes 2 nitroglycerin a week when he has sharp left arm pain, which typically relieves his pain before it spreads to his left chest. Also notes tylenol helps as well. The patient reports being fatigued. He denies fever, chills, headache, dyspnea, peripheral edema, vomiting, diarrhea, constipation, or numbness/tingling of the extremities.     In the ED, VSS, afebrile. CBC with stable anemia. Bicarb 21. Cr 6.0 (consistent with ESRD). Glucose 139. Albumin 3.0. BNP 1,072. HS troponin 349>>333>>339. EKG shows SR with LVH, 89 bpm, no ST elevation or depression. CXR with cardiomegaly and perihilar edema in lungs. The  patient received sl nitro.    Overview/Hospital Course:  Patient with known multivessel CAD presenting with recurrent chest pain. HS trop below baseline and flat. Recently started on medical management. Cards consulted. Nephro consulted for HD. Discussed with cardiology, due to severity of disease, patient is likely to have ongoing chest pain. Okay to use NTG prn to relief. He is on max medical therapy. Lesions are not amenable to PCI and he is not a CABG candidate.     2 episodes of chest pain responsive to NTG Am 3/2. Trop collected in late afternoon results at 5700, up from <350 day prior. Cardiology re consulted. No further episodes of chest pain today but has had recurrent left arm pain. Started on hep gtt and given full dose ASA for NSTEMI. HS trop peaked at 7200 early am 3/3. No episodes of chest pain on hep gtt.     Interval History: No acute events overnight. Patient feeling fine this morning. No further chest pain.     Review of Systems  Objective:     Vital Signs (Most Recent):  Temp: 97.9 °F (36.6 °C) (03/03/25 1109)  Pulse: 89 (03/03/25 1109)  Resp: 20 (03/03/25 1109)  BP: (!) 109/57 (03/03/25 1109)  SpO2: 95 % (03/03/25 1109) Vital Signs (24h Range):  Temp:  [97.8 °F (36.6 °C)-98.6 °F (37 °C)] 97.9 °F (36.6 °C)  Pulse:  [] 89  Resp:  [17-20] 20  SpO2:  [90 %-96 %] 95 %  BP: ()/(51-68) 109/57     Weight: 71.1 kg (156 lb 12 oz)  Body mass index is 24.55 kg/m².    Intake/Output Summary (Last 24 hours) at 3/3/2025 1412  Last data filed at 3/3/2025 0208  Gross per 24 hour   Intake 21.3 ml   Output 1600 ml   Net -1578.7 ml         Physical Exam  Vitals and nursing note reviewed.   Constitutional:       General: He is not in acute distress.     Appearance: He is not ill-appearing, toxic-appearing or diaphoretic.   HENT:      Head: Normocephalic.   Eyes:      General: No scleral icterus.     Pupils: Pupils are equal, round, and reactive to light.   Cardiovascular:      Rate and Rhythm: Regular  rhythm. Tachycardia present.      Heart sounds: Murmur heard.      Arteriovenous access: Right arteriovenous access is present.     Comments: +thrill  Pulmonary:      Effort: No respiratory distress.      Breath sounds: Normal breath sounds. No wheezing, rhonchi or rales.   Chest:      Chest wall: No tenderness.   Abdominal:      General: There is no distension.      Palpations: Abdomen is soft.      Tenderness: There is no abdominal tenderness. There is no guarding.   Musculoskeletal:         General: Normal range of motion.      Cervical back: Normal range of motion.      Right lower leg: No edema.      Left lower leg: No edema.   Skin:     General: Skin is warm and dry.      Capillary Refill: Capillary refill takes less than 2 seconds.   Neurological:      Mental Status: He is alert and oriented to person, place, and time. Mental status is at baseline.   Psychiatric:         Mood and Affect: Mood normal.         Behavior: Behavior normal.             Significant Labs: All pertinent labs within the past 24 hours have been reviewed.  CBC:   Recent Labs   Lab 03/02/25  0557 03/02/25  1506 03/03/25  0432   WBC 13.04* 10.50 11.86  11.86   HGB 9.4* 8.4* 8.5*  8.5*   HCT 30.7* 26.3* 26.9*  26.9*    406 401  401     CMP:   Recent Labs   Lab 03/02/25  0557 03/03/25  0432   * 132*   K 4.7 4.0    98   CO2 19* 23   * 122*   BUN 65* 43*   CREATININE 10.1* 7.5*   CALCIUM 9.7 9.1   PROT 8.0 7.3   ALBUMIN 3.0* 2.7*   BILITOT 0.7 0.7   ALKPHOS 89 77   AST 23 20   ALT 9* 7*   ANIONGAP 14 11     Troponin:   Recent Labs   Lab 03/02/25  1505 03/03/25  0432 03/03/25  0836   TROPONINIHS 5746* 7227* 7124*       Significant Imaging: I have reviewed all pertinent imaging results/findings within the past 24 hours.    Assessment and Plan     * NSTEMI (non-ST elevated myocardial infarction)  Patient with known CAD s/p stent placement, which is uncontrolled Will continue ASA, Plavix, and Statin and monitor for  S/Sx of angina/ACS. Continue to monitor on telemetry. Trop peaked at 7200 am 3/2. Patient currently on hep gtt.     Chest pain  Acute, patient reports chest pain was resolved on admit. Described as left lower chest wall pain that is sharp in nature. No associated SOB, N/V, or diaphoresis. Concern for MSK component. Will give tylenol and gabapentin. EKG without ST-segment elevation or depression, HR 89.  Troponin 349>>333>>339 (chronically elevated per chart review). BNP 1,072. CXR with cardiomegaly and perihilar edema.   Recently admitted at [facility] with CP and underwent LHC (see below). No culprit lesion was identified. Cardiology recommended maximal medical therapy given extensive multivessel coronary artery disease which is not amenable to revascularization (either PCI or bypass surgery - transmural scar in distal LAD/left circumflex/PLB noted on PET scan done in October, 2024).  - Continue ASA, statin, plavix, BB, imdur, and ranexa.  - Cardiac monitoring.  - PRN EKG and sl nitro for chest pain  - cards consult as chest pain has recurred >> Concern for new NSTEMI > started on hep drip afternoon 3/2    -ECHO 2/10/25:    Left Ventricle: The left ventricle is mildly dilated. Mildly increased wall thickness. There is mild concentric hypertrophy. There is low normal systolic function with a visually estimated ejection fraction of 50 - 55%. Grade II diastolic dysfunction.    Right Ventricle: Normal right ventricular cavity size. Systolic function is normal.    Left Atrium: Left atrium is mildly dilated.    Aortic Valve: The aortic valve is a trileaflet valve. There is moderate aortic valve sclerosis.    Mitral Valve: There is mild to moderate regurgitation.    Tricuspid Valve: There is mild regurgitation.    -Coronary angiography 2/10/25:     Severe multivessel coronary artery disease is present    Distal left main had 30-40% calcific stenosis    Proximal LAD had 70-80% calcific stenosis and 100% stenosis in mid to  "distal portion.  Distal LAD was getting filled via septal collaterals    2nd diagonal artery had 90% stenosis in midportion    Mid left circumflex artery had 40% calcific stenosis.  Stenosis severity can not be determined accurately due to presence of eccentric calcification    Mid RCA had 90% InStent restenosis followed by chronic total occlusion.  Distal RCA was getting filled via right to right collaterals and left-to-right collaterals    Right common femoral arterial access was closed with a Perclose device    Low normal LVEDP (7 mmHg)    Type 2 diabetes mellitus with chronic kidney disease on chronic dialysis, without long-term current use of insulin  Patient's FSGs are controlled with diet alone.   Last A1c reviewed-   Lab Results   Component Value Date    HGBA1C 5.1 01/08/2025     Most recent fingerstick glucose reviewed- No results for input(s): "POCTGLUCOSE" in the last 24 hours.  Current correctional scale  Low  Maintain anti-hyperglycemic dose as follows-   Antihyperglycemics (From admission, onward)      None        - Deferring SSI & CBGs given euglycemia   - Hypoglycemic protocol     PAD (peripheral artery disease)  History noted.  - Continue ASA, statin, and plavix.    Chronic diastolic heart failure  Patient is identified as having Diastolic (HFpEF) heart failure that is Acute on Chronic. CHF is currently uncontrolled due to Pulmonary edema/pleural effusion on CXR. Latest ECHO performed and demonstrates- Results for orders placed during the hospital encounter of 02/09/25    Echo    Interpretation Summary    Left Ventricle: The left ventricle is mildly dilated. Mildly increased wall thickness. There is mild concentric hypertrophy. There is low normal systolic function with a visually estimated ejection fraction of 50 - 55%. Grade II diastolic dysfunction.    Right Ventricle: Normal right ventricular cavity size. Systolic function is normal.    Left Atrium: Left atrium is mildly dilated.    Aortic " Valve: The aortic valve is a trileaflet valve. There is moderate aortic valve sclerosis.    Mitral Valve: There is mild to moderate regurgitation.    Tricuspid Valve: There is mild regurgitation.  . Continue Beta Blocker Nitrate/Vasodilator and monitor clinical status closely. Monitor on telemetry. Patient is off CHF pathway.  Monitor strict Is&Os and daily weights.  Place on fluid restriction of 1.5 L. Continue to stress to patient importance of self efficacy and  on diet for CHF. Last BNP reviewed- and noted below   Recent Labs   Lab 02/28/25  1214   BNP 1,072*     -CXR with cardiomegaly and pulmonary edema.  -No peripheral edema on exam.  -Volume management per HD.    Chronic kidney disease-mineral and bone disorder  History noted.  - Continue renvela.    Anemia in end-stage renal disease  Anemia is likely due to chronic disease due to ESRD. Most recent hemoglobin and hematocrit are listed below.  Recent Labs     03/02/25  0557 03/02/25  1506 03/03/25  0432   HGB 9.4* 8.4* 8.5*  8.5*   HCT 30.7* 26.3* 26.9*  26.9*       Plan  - Monitor serial CBC: Daily  - Transfuse PRBC if patient becomes hemodynamically unstable, symptomatic or H/H drops below 7/21.  - Patient has not received any PRBC transfusions to date  - Patient's anemia is currently stable    Essential hypertension  Patient's blood pressure range in the last 24 hours was: BP  Min: 92/53  Max: 117/60.The patient's inpatient anti-hypertensive regimen is listed below:  Current Antihypertensives  carvediloL tablet 25 mg, 2 times daily with meals, Oral  hydrALAZINE tablet 25 mg, Every 8 hours, Oral  NIFEdipine 24 hr tablet 90 mg, Daily, Oral  ranolazine 12 hr tablet 500 mg, Daily, Oral  minoxidiL tablet 10 mg, 2 times daily, Oral  isosorbide mononitrate 24 hr tablet 120 mg, Daily, Oral  nitroGLYCERIN SL tablet 0.4 mg, Every 5 min PRN, Sublingual  nitroGLYCERIN (NITROSTAT) SL tablet, Every 5 min PRN, Sublingual    Plan  - BP is controlled, no changes  needed to their regimen    ESRD on hemodialysis  MWF schedule, dialyzed today but stopped after 1hr and 45min due to CP  Residual renal function?- No    - Nephrology consulted  - Continue chronic hemodialysis  - Monitor daily electrolytes and defer dialysis orders to nephrology  - Renally dose medications  - Renal diet  - Continue phosphorus binders  - Daily weights/strict I&Os  - 1.5L FR     Hemiparesis affecting right side as late effect of cerebrovascular accident  History noted.  - Continue ASA, statin, and plavix.  - Fall precautions.      VTE Risk Mitigation (From admission, onward)           Ordered     heparin 25,000 units in dextrose 5% (100 units/ml) IV bolus from bag LOW INTENSITY nomogram - [facility]  As needed (PRN)        Question:  Heparin Infusion Adjustment (DO NOT MODIFY ANSWER)  Answer:  [email or web address]\Images\Pharmacy\HeparinInfusions\heparin LOW INTENSITY nomogram for [facility] [id number]    03/02/25 1456     heparin 25,000 units in dextrose 5% (100 units/ml) IV bolus from bag LOW INTENSITY nomogram - [facility]  As needed (PRN)        Question:  Heparin Infusion Adjustment (DO NOT MODIFY ANSWER)  Answer:  [email or web address]\Images\Pharmacy\HeparinInfusions\heparin LOW INTENSITY nomogram for [facility] [id number]    03/02/25 1456     heparin 25,000 units in dextrose 5% 250 mL (100 units/mL) infusion LOW INTENSITY nomogram - [facility]  Continuous        Question:  Begin at (units/kg/hr)  Answer:  12    03/02/25 1456     IP VTE HIGH RISK PATIENT  Once         02/28/25 1906     Place sequential compression device  Until discontinued         02/28/25 1906                    Discharge Planning   QUIN: 3/4/2025     Code Status: Full Code   Medical Readiness for Discharge Date:   Discharge Plan A: Home                        Anjana Maharaj MD  Department of Hospital Medicine   [facility]

## 2025-03-03 NOTE — SUBJECTIVE & OBJECTIVE
Interval History: No active events overnight. Had an eventful HD yesterday. Vitals were stable. Labs were remarkable for hyponatremia.     ROS  Objective:     Vital Signs (Most Recent):  Temp: 97.9 °F (36.6 °C) (03/03/25 1109)  Pulse: 89 (03/03/25 1109)  Resp: 20 (03/03/25 1109)  BP: (!) 109/57 (03/03/25 1109)  SpO2: 95 % (03/03/25 1109) Vital Signs (24h Range):  Temp:  [97.8 °F (36.6 °C)-98.6 °F (37 °C)] 97.9 °F (36.6 °C)  Pulse:  [] 89  Resp:  [17-20] 20  SpO2:  [90 %-96 %] 95 %  BP: ()/(51-68) 109/57     Weight: 71.1 kg (156 lb 12 oz)  Body mass index is 24.55 kg/m².     SpO2: 95 %         Intake/Output Summary (Last 24 hours) at 3/3/2025 1346  Last data filed at 3/3/2025 0208  Gross per 24 hour   Intake 21.3 ml   Output 1600 ml   Net -1578.7 ml       Lines/Drains/Airways       Peripheral Intravenous Line  Duration                  Hemodialysis AV Fistula Right forearm -- days         Peripheral IV - Single Lumen 02/28/25 1213 18 G Left Forearm 3 days                       Physical Exam  Vitals and nursing note reviewed.   Constitutional:       General: He is not in acute distress.  Cardiovascular:      Rate and Rhythm: Normal rate and regular rhythm.   Musculoskeletal:      Comments: Swollen right leg, no tenderness.   Neurological:      Mental Status: He is alert and oriented to person, place, and time.   Psychiatric:         Mood and Affect: Mood normal.         Behavior: Behavior normal.            Significant Labs: All pertinent lab results from the last 24 hours have been reviewed.    Significant Imaging:

## 2025-03-03 NOTE — SUBJECTIVE & OBJECTIVE
Interval History: No acute events overnight. Patient feeling fine this morning. No further chest pain.     Review of Systems  Objective:     Vital Signs (Most Recent):  Temp: 97.9 °F (36.6 °C) (03/03/25 1109)  Pulse: 89 (03/03/25 1109)  Resp: 20 (03/03/25 1109)  BP: (!) 109/57 (03/03/25 1109)  SpO2: 95 % (03/03/25 1109) Vital Signs (24h Range):  Temp:  [97.8 °F (36.6 °C)-98.6 °F (37 °C)] 97.9 °F (36.6 °C)  Pulse:  [] 89  Resp:  [17-20] 20  SpO2:  [90 %-96 %] 95 %  BP: ()/(51-68) 109/57     Weight: 71.1 kg (156 lb 12 oz)  Body mass index is 24.55 kg/m².    Intake/Output Summary (Last 24 hours) at 3/3/2025 1412  Last data filed at 3/3/2025 0208  Gross per 24 hour   Intake 21.3 ml   Output 1600 ml   Net -1578.7 ml         Physical Exam  Vitals and nursing note reviewed.   Constitutional:       General: He is not in acute distress.     Appearance: He is not ill-appearing, toxic-appearing or diaphoretic.   HENT:      Head: Normocephalic.   Eyes:      General: No scleral icterus.     Pupils: Pupils are equal, round, and reactive to light.   Cardiovascular:      Rate and Rhythm: Regular rhythm. Tachycardia present.      Heart sounds: Murmur heard.      Arteriovenous access: Right arteriovenous access is present.     Comments: +thrill  Pulmonary:      Effort: No respiratory distress.      Breath sounds: Normal breath sounds. No wheezing, rhonchi or rales.   Chest:      Chest wall: No tenderness.   Abdominal:      General: There is no distension.      Palpations: Abdomen is soft.      Tenderness: There is no abdominal tenderness. There is no guarding.   Musculoskeletal:         General: Normal range of motion.      Cervical back: Normal range of motion.      Right lower leg: No edema.      Left lower leg: No edema.   Skin:     General: Skin is warm and dry.      Capillary Refill: Capillary refill takes less than 2 seconds.   Neurological:      Mental Status: He is alert and oriented to person, place, and time.  Mental status is at baseline.   Psychiatric:         Mood and Affect: Mood normal.         Behavior: Behavior normal.             Significant Labs: All pertinent labs within the past 24 hours have been reviewed.  CBC:   Recent Labs   Lab 03/02/25  0557 03/02/25  1506 03/03/25  0432   WBC 13.04* 10.50 11.86  11.86   HGB 9.4* 8.4* 8.5*  8.5*   HCT 30.7* 26.3* 26.9*  26.9*    406 401  401     CMP:   Recent Labs   Lab 03/02/25  0557 03/03/25  0432   * 132*   K 4.7 4.0    98   CO2 19* 23   * 122*   BUN 65* 43*   CREATININE 10.1* 7.5*   CALCIUM 9.7 9.1   PROT 8.0 7.3   ALBUMIN 3.0* 2.7*   BILITOT 0.7 0.7   ALKPHOS 89 77   AST 23 20   ALT 9* 7*   ANIONGAP 14 11     Troponin:   Recent Labs   Lab 03/02/25  1505 03/03/25  0432 03/03/25  0836   TROPONINIHS 5746* 7227* 7124*       Significant Imaging: I have reviewed all pertinent imaging results/findings within the past 24 hours.

## 2025-03-03 NOTE — ASSESSMENT & PLAN NOTE
Patient with known CAD s/p stent placement, which is uncontrolled Will continue ASA, Plavix, and Statin and monitor for S/Sx of angina/ACS. Continue to monitor on telemetry. Trop peaked at 7200 am 3/2. Patient currently on hep gtt.

## 2025-03-03 NOTE — ASSESSMENT & PLAN NOTE
Anemia is likely due to chronic disease due to ESRD. Most recent hemoglobin and hematocrit are listed below.  Recent Labs     03/02/25  0557 03/02/25  1506 03/03/25  0432   HGB 9.4* 8.4* 8.5*  8.5*   HCT 30.7* 26.3* 26.9*  26.9*       Plan  - Monitor serial CBC: Daily  - Transfuse PRBC if patient becomes hemodynamically unstable, symptomatic or H/H drops below 7/21.  - Patient has not received any PRBC transfusions to date  - Patient's anemia is currently stable

## 2025-03-03 NOTE — PLAN OF CARE
[facility]  Discharge Assessment    Primary Care Provider: Mireya Larose MD     Discharge Assessment (most recent)       BRIEF DISCHARGE ASSESSMENT - 03/03/25 1124          Discharge Planning    Assessment Type Discharge Planning Brief Assessment     Resource/Environmental Concerns none     Support Systems Family members     Equipment Currently Used at Home rollator     Current Living Arrangements apartment     Patient/Family Anticipates Transition to home     Patient/Family Anticipated Services at Transition none     DME Needed Upon Discharge  none     Discharge Plan A Home     Discharge Plan B Home                   Pt is a 49 y.o. male admitted with chest pain and has a PM[facility] He lives in a ground floor apartment by himself. He is independent with his ADls and iADLs. He is unsure of transportation home. [facility] Discharge Packet given to patient and/or family with understanding verbalized.   name and number and estimated discharge date written on white board in patient's room with request to call for any questions or concerns.  Will continue to follow for needs.  Discharge Plan A and Plan B have been determined by review of patient's clinical status, future medical and therapeutic needs, and coverage/benefits for post-acute care in coordination with multidisciplinary team members.  Edil Saez RN,BSN

## 2025-03-03 NOTE — PROGRESS NOTES
[facility]  Cardiology  Progress Note    Patient Name: Haroldo Dickinson  MRN: [id number]  Admission Date: 2/28/2025  Hospital Length of Stay: 1 days  Code Status: Full Code   Attending Physician: Anjana Maharaj MD   Primary Care Physician: Mireya Larose MD  Expected Discharge Date: 3/4/2025  Principal Problem:NSTEMI (non-ST elevated myocardial infarction)    Subjective:     Hospital Course:   Cardiology consulted earlier this admission for elevated troponin and pain with dialysis. Cardiology recommended ongoing maximal medical therapy as his lesions are not amenable to PCI and he is not a CABG candidate.      The morning of 3/2 he had 2 episodes of chest pain responsive to NTG Trop collected in late afternoon results at 5700, up from <350 day prior. Cardiology re consulted. No further episodes of chest pain today but has had recurrent left arm pain. Started on hep gtt and given full dose ASA. Had dialysis yesterday, which was uneventful.    Interval History: No active events overnight. Had an eventful HD yesterday. Vitals were stable. Labs were remarkable for hyponatremia.     ROS  Objective:     Vital Signs (Most Recent):  Temp: 97.9 °F (36.6 °C) (03/03/25 1109)  Pulse: 89 (03/03/25 1109)  Resp: 20 (03/03/25 1109)  BP: (!) 109/57 (03/03/25 1109)  SpO2: 95 % (03/03/25 1109) Vital Signs (24h Range):  Temp:  [97.8 °F (36.6 °C)-98.6 °F (37 °C)] 97.9 °F (36.6 °C)  Pulse:  [] 89  Resp:  [17-20] 20  SpO2:  [90 %-96 %] 95 %  BP: ()/(51-68) 109/57     Weight: 71.1 kg (156 lb 12 oz)  Body mass index is 24.55 kg/m².     SpO2: 95 %         Intake/Output Summary (Last 24 hours) at 3/3/2025 1346  Last data filed at 3/3/2025 0208  Gross per 24 hour   Intake 21.3 ml   Output 1600 ml   Net -1578.7 ml       Lines/Drains/Airways       Peripheral Intravenous Line  Duration                  Hemodialysis AV Fistula Right forearm -- days         Peripheral IV - Single Lumen 02/28/25 1213 18 G Left Forearm 3 days                        Physical Exam  Vitals and nursing note reviewed.   Constitutional:       General: He is not in acute distress.  Cardiovascular:      Rate and Rhythm: Normal rate and regular rhythm.   Musculoskeletal:      Comments: Swollen right leg, no tenderness.   Neurological:      Mental Status: He is alert and oriented to person, place, and time.   Psychiatric:         Mood and Affect: Mood normal.         Behavior: Behavior normal.            Significant Labs: All pertinent lab results from the last 24 hours have been reviewed.    Significant Imaging:     Assessment and Plan:     Brief HPI:     * NSTEMI (non-ST elevated myocardial infarction)  Patient with known CAD s/p stent placement, which is uncontrolled Will continue ASA, Plavix, and Statin  - Pt with Hx of Mid RCA disease s/p PCI 7/25/19   - Presented with recurrent chest pain.   - Recently admitted at [facility] with CP and underwent C; medical therapy recommended   - ECHO 2/10/25 - EF 50-55%      C Cath 11/28/23     There was three vessel coronary artery disease.    The Mid LAD lesion was 90% stenosed.    The Mid RCA lesion was 100% stenosed.    The Prox LAD lesion was 75% stenosed.    The pre-procedure left ventricular end diastolic pressure was 26.    There was diastolic dysfunction.    Interval worsening of mid LAD disease. Given diffuse nature of CAD will recommend medcial therapy and HD optimization to improve left sided filling pressures    The estimated blood loss was <50 mL.     Cath 2/10/25:     Severe multivessel coronary artery disease is present    Distal left main had 30-40% calcific stenosis    Proximal LAD had 70-80% calcific stenosis and 100% stenosis in mid to distal portion.  Distal LAD was getting filled via septal collaterals    2nd diagonal artery had 90% stenosis in midportion    Mid left circumflex artery had 40% calcific stenosis.  Stenosis severity can not be determined accurately due to presence of eccentric  calcification    Mid RCA had 90% InStent restenosis followed by chronic total occlusion.  Distal RCA was getting filled via right to right collaterals and left-to-right collaterals    Right common femoral arterial access was closed with a Perclose device    Low normal LVEDP (7 mmHg)    Recommendation   - Give pt diffuse disease, and as per record from IC recommendations WB: No culprit lesion was identified on current study and given extensive multivessel coronary artery disease which is not amenable to revascularization (either PCI or bypass surgery - transmural scar in distal LAD/left circumflex/PLB noted on PET scan done in October, 2024)  recommended maximal medical therapy  - continue heparin gtt for now  - repeat troponin, trend to peak  - Continue ASA 81 mg qd   - Continue with Lipitor 80 mg   - Continue with Plavix  75 mg   - Continue with coreg 25 mg BID   - Continue with nitro sublingual prn   - Continue with ranolazine 500 mg qd   - Continue with Isosorbide mononitrate 120mg daily  - Pt is on adequate medical therapy.   - Discontinue Hydralazine  - US of the lower RE to r/o DVT   - Follow up with cardiology clinic upon discharge for further management   - Please call cardiology fellow on call for unremitting angina    Note: Recommendations are preliminary until signed by the attending.    Chronic diastolic heart failure  Lasted ECHO: 2/10/25  estimated ejection fraction of 50 - 55%. Grade II diastolic dysfunction.  - CXR notable for pulmonary edema and pleural effusion   - Pt with ESRD on HD.   - No peripheral edema on exam.    Recommendations   - Volume management per HD  - Stop  Hydralazine if needed to give room for antianginal mediations  - Additional Coreg 25 mg with dialysis  - Recommend 2 gram sodium restriction and 1500cc fluid restriction.  - Encourage physical activity with graded exercise program.  - Monitor fluid intake         VTE Risk Mitigation (From admission, onward)           Ordered      heparin 25,000 units in dextrose 5% (100 units/ml) IV bolus from bag LOW INTENSITY nomogram - [facility]  As needed (PRN)        Question:  Heparin Infusion Adjustment (DO NOT MODIFY ANSWER)  Answer:  [email or web address]\epic\Images\Pharmacy\HeparinInfusions\heparin LOW INTENSITY nomogram for [facility] [id number]    03/02/25 1456     heparin 25,000 units in dextrose 5% (100 units/ml) IV bolus from bag LOW INTENSITY nomogram - [facility]  As needed (PRN)        Question:  Heparin Infusion Adjustment (DO NOT MODIFY ANSWER)  Answer:  [email or web address]\epic\Images\Pharmacy\HeparinInfusions\heparin LOW INTENSITY nomogram for [facility] [id number]    03/02/25 1456     heparin 25,000 units in dextrose 5% 250 mL (100 units/mL) infusion LOW INTENSITY nomogram - [facility]  Continuous        Question:  Begin at (units/kg/hr)  Answer:  12    03/02/25 1456     IP VTE HIGH RISK PATIENT  Once         02/28/25 1906     Place sequential compression device  Until discontinued         02/28/25 1906                    Dina Escobar MD  Cardiology  [facility] - [facility]

## 2025-03-03 NOTE — ASSESSMENT & PLAN NOTE
Patient with known CAD s/p stent placement, which is uncontrolled Will continue ASA, Plavix, and Statin  - Pt with Hx of Mid RCA disease s/p PCI 7/25/19   - Presented with recurrent chest pain.   - Recently admitted at [facility] with CP and underwent C; medical therapy recommended   - ECHO 2/10/25 - EF 50-55%      [facility] Cath 11/28/23     There was three vessel coronary artery disease.    The Mid LAD lesion was 90% stenosed.    The Mid RCA lesion was 100% stenosed.    The Prox LAD lesion was 75% stenosed.    The pre-procedure left ventricular end diastolic pressure was 26.    There was diastolic dysfunction.    Interval worsening of mid LAD disease. Given diffuse nature of CAD will recommend medcial therapy and HD optimization to improve left sided filling pressures    The estimated blood loss was <50 mL.     Cath 2/10/25:     Severe multivessel coronary artery disease is present    Distal left main had 30-40% calcific stenosis    Proximal LAD had 70-80% calcific stenosis and 100% stenosis in mid to distal portion.  Distal LAD was getting filled via septal collaterals    2nd diagonal artery had 90% stenosis in midportion    Mid left circumflex artery had 40% calcific stenosis.  Stenosis severity can not be determined accurately due to presence of eccentric calcification    Mid RCA had 90% InStent restenosis followed by chronic total occlusion.  Distal RCA was getting filled via right to right collaterals and left-to-right collaterals    Right common femoral arterial access was closed with a Perclose device    Low normal LVEDP (7 mmHg)    Recommendation   - Give pt diffuse disease, and as per record from IC recommendations : No culprit lesion was identified on current study and given extensive multivessel coronary artery disease which is not amenable to revascularization (either PCI or bypass surgery - transmural scar in distal LAD/left circumflex/PLB noted on PET scan done in October, 2024)  recommended maximal  medical therapy  - continue heparin gtt for now  - repeat troponin, trend to peak  - Continue ASA 81 mg qd   - Continue with Lipitor 80 mg   - Continue with Plavix  75 mg   - Continue with coreg 25 mg BID   - Continue with nitro sublingual prn   - Continue with ranolazine 500 mg qd   - Continue with Isosorbide mononitrate 120mg daily  - Pt is on adequate medical therapy.   - Discontinue Hydralazine  - US of the lower RE to r/o DVT   - Follow up with cardiology clinic upon discharge for further management   - Please call cardiology fellow on call for unremitting angina    Note: Recommendations are preliminary until signed by the attending.

## 2025-03-04 VITALS
HEIGHT: 67 IN | DIASTOLIC BLOOD PRESSURE: 62 MMHG | OXYGEN SATURATION: 92 % | TEMPERATURE: 98 F | BODY MASS INDEX: 24.6 KG/M2 | RESPIRATION RATE: 18 BRPM | SYSTOLIC BLOOD PRESSURE: 111 MMHG | WEIGHT: 156.75 LBS | HEART RATE: 93 BPM

## 2025-03-04 LAB
APTT PPP: 32.8 SEC (ref 21–32)
BASOPHILS # BLD AUTO: 0.13 K/UL (ref 0–0.2)
BASOPHILS NFR BLD: 1 % (ref 0–1.9)
DIFFERENTIAL METHOD BLD: ABNORMAL
EOSINOPHIL # BLD AUTO: 0.8 K/UL (ref 0–0.5)
EOSINOPHIL NFR BLD: 6 % (ref 0–8)
ERYTHROCYTE [DISTWIDTH] IN BLOOD BY AUTOMATED COUNT: 16.5 % (ref 11.5–14.5)
HCT VFR BLD AUTO: 25.6 % (ref 40–54)
HGB BLD-MCNC: 8.1 G/DL (ref 14–18)
IMM GRANULOCYTES # BLD AUTO: 0.08 K/UL (ref 0–0.04)
IMM GRANULOCYTES NFR BLD AUTO: 0.6 % (ref 0–0.5)
LYMPHOCYTES # BLD AUTO: 1.7 K/UL (ref 1–4.8)
LYMPHOCYTES NFR BLD: 13.7 % (ref 18–48)
MCH RBC QN AUTO: 29.5 PG (ref 27–31)
MCHC RBC AUTO-ENTMCNC: 31.6 G/DL (ref 32–36)
MCV RBC AUTO: 93 FL (ref 82–98)
MONOCYTES # BLD AUTO: 1.1 K/UL (ref 0.3–1)
MONOCYTES NFR BLD: 8.5 % (ref 4–15)
NEUTROPHILS # BLD AUTO: 8.9 K/UL (ref 1.8–7.7)
NEUTROPHILS NFR BLD: 70.2 % (ref 38–73)
NRBC BLD-RTO: 0 /100 WBC
PLATELET # BLD AUTO: 409 K/UL (ref 150–450)
PMV BLD AUTO: 10 FL (ref 9.2–12.9)
RBC # BLD AUTO: 2.75 M/UL (ref 4.6–6.2)
WBC # BLD AUTO: 12.66 K/UL (ref 3.9–12.7)

## 2025-03-04 PROCEDURE — 36415 COLL VENOUS BLD VENIPUNCTURE: CPT | Mod: HCNC | Performed by: STUDENT IN AN ORGANIZED HEALTH CARE EDUCATION/TRAINING PROGRAM

## 2025-03-04 PROCEDURE — 25000003 PHARM REV CODE 250: Mod: HCNC | Performed by: NURSE PRACTITIONER

## 2025-03-04 PROCEDURE — 85730 THROMBOPLASTIN TIME PARTIAL: CPT | Mod: HCNC | Performed by: STUDENT IN AN ORGANIZED HEALTH CARE EDUCATION/TRAINING PROGRAM

## 2025-03-04 PROCEDURE — 25000003 PHARM REV CODE 250: Mod: HCNC | Performed by: STUDENT IN AN ORGANIZED HEALTH CARE EDUCATION/TRAINING PROGRAM

## 2025-03-04 PROCEDURE — 63600175 PHARM REV CODE 636 W HCPCS: Mod: HCNC | Performed by: STUDENT IN AN ORGANIZED HEALTH CARE EDUCATION/TRAINING PROGRAM

## 2025-03-04 PROCEDURE — 85025 COMPLETE CBC W/AUTO DIFF WBC: CPT | Mod: HCNC | Performed by: STUDENT IN AN ORGANIZED HEALTH CARE EDUCATION/TRAINING PROGRAM

## 2025-03-04 RX ORDER — NIFEDIPINE 60 MG/1
60 TABLET, EXTENDED RELEASE ORAL 2 TIMES DAILY
Qty: 60 TABLET | Refills: 11 | Status: SHIPPED | OUTPATIENT
Start: 2025-03-04 | End: 2026-03-04

## 2025-03-04 RX ORDER — NIFEDIPINE 60 MG/1
60 TABLET, EXTENDED RELEASE ORAL 2 TIMES DAILY
Status: DISCONTINUED | OUTPATIENT
Start: 2025-03-04 | End: 2025-03-04 | Stop reason: HOSPADM

## 2025-03-04 RX ORDER — SODIUM CHLORIDE 9 MG/ML
INJECTION, SOLUTION INTRAVENOUS ONCE
Status: CANCELLED | OUTPATIENT
Start: 2025-03-05

## 2025-03-04 RX ADMIN — EZETIMIBE 10 MG: 10 TABLET ORAL at 08:03

## 2025-03-04 RX ADMIN — ASPIRIN 81 MG: 81 TABLET, COATED ORAL at 08:03

## 2025-03-04 RX ADMIN — NIFEDIPINE 60 MG: 60 TABLET, FILM COATED, EXTENDED RELEASE ORAL at 08:03

## 2025-03-04 RX ADMIN — HEPARIN SODIUM AND DEXTROSE 18 UNITS/KG/HR: 10000; 5 INJECTION INTRAVENOUS at 05:03

## 2025-03-04 RX ADMIN — SEVELAMER CARBONATE 1600 MG: 800 TABLET, FILM COATED ORAL at 07:03

## 2025-03-04 RX ADMIN — CLOPIDOGREL BISULFATE 75 MG: 75 TABLET ORAL at 08:03

## 2025-03-04 NOTE — ASSESSMENT & PLAN NOTE
Anemia is likely due to chronic disease due to ESRD. Most recent hemoglobin and hematocrit are listed below.  Recent Labs     03/02/25  1506 03/03/25  0432 03/04/25  0428   HGB 8.4* 8.5*  8.5* 8.1*   HCT 26.3* 26.9*  26.9* 25.6*       Plan  - Monitor serial CBC: Daily  - Transfuse PRBC if patient becomes hemodynamically unstable, symptomatic or H/H drops below 7/21.  - Patient has not received any PRBC transfusions to date  - Patient's anemia is currently stable

## 2025-03-04 NOTE — DISCHARGE SUMMARY
[facility] [address] [place] [address] Medicine  Discharge Summary      Patient Name: Haroldo Dickinson  MRN: [id number]  SADIE: [id number]  Patient Class: IP- Inpatient  Admission Date: 2/28/2025  Hospital Length of Stay: 2 days  Discharge Date and Time: 3/4/2025 12:00 PM  Attending Physician: Anjana Maharaj MD   Discharging Provider: Anjana Maharaj MD  Primary Care Provider: Mireya Larose MD  [facility] Medicine Team: [facility] Anjana Maharaj MD  Primary Care Team: [facility]    HPI:   Haroldo Dickinson is a 49 y.o. male with a PMHx of previous CVA, ACS, CAD, NSTEMI s/p PTCA on Plavix, ESRD on dialysis (MWF), HTN, and renal cell carcinoma s/p nephrectomy who presents to the ED with left-sided 8/10 sharp chest pain radiating to the left arm that occurred while at hemodialysis at 10 AM today. Denies associated SOB, N/V, or diaphoresis. Has has similar pain in the past and states his pain typically starts as a sharp pain in the left arm and eventually spreads to his left lower chest. The patient was given and nitroglycerin 0.4 mg SL X 1 at the HD center. The patient was then given  mg PO X 1 per EMS, which helped the pain subside. The patient then had another episode of 8/10 left-sided chest pain radiating to the left arm that occurred while in the ED which was relieved by nitroglycerin 0.4 mg SL X 1. The patient states they have not had any subsequent episodes of chest pain since the second dose of nitroglycerin. The patient takes 2 nitroglycerin a week when he has sharp left arm pain, which typically relieves his pain before it spreads to his left chest. Also notes tylenol helps as well. The patient reports being fatigued. He denies fever, chills, headache, dyspnea, peripheral edema, vomiting, diarrhea, constipation, or numbness/tingling of the extremities.     In the ED, VSS, afebrile. CBC with stable anemia. Bicarb 21. Cr 6.0 (consistent with ESRD). Glucose 139. Albumin 3.0. BNP 1,072. HS troponin  349>>333>>339. EKG shows SR with LVH, 89 bpm, no ST elevation or depression. CXR with cardiomegaly and perihilar edema in lungs. The patient received sl nitro.    * No surgery found *      Hospital Course:   Patient with known multivessel CAD presenting with recurrent chest pain. HS trop below baseline and flat. Recently started on medical management. Cards consulted. Nephro consulted for HD. Discussed with cardiology, due to severity of disease, patient is likely to have ongoing chest pain. Okay to use NTG prn to relief. He is on max medical therapy. Lesions are not amenable to PCI and he is not a CABG candidate.     2 episodes of chest pain responsive to NTG Am 3/2. Trop collected in late afternoon results at 5700, up from <350 day prior. Cardiology re consulted. No further episodes of chest pain today but has had recurrent left arm pain. Started on hep gtt and given full dose ASA for NSTEMI. HS trop peaked at 7200 early am 3/3. No episodes of chest pain on hep gtt.  Heparin drip stopped am 3/4. Per cards, continue max medical management. NTG for relief. Follow-up outpatient. Patient to have regularly scheduled HD done 3/5 at outpatient chair.     Goals of Care Treatment Preferences:  Code Status: Full Code    Health care agent: Ariadne Dickinson (sister)  Health care agent number: [phone]          What is most important right now is to focus on extending life as long as possible, even it it means sacrificing quality, curative/life-prolongation (regardless of treatment burdens).  Accordingly, we have decided that the best plan to meet the patient's goals includes continuing with treatment.      SDOH Screening:  The patient was screened for utility difficulties, food insecurity, transport difficulties, housing insecurity, and interpersonal safety and there were no concerns identified this admission.     Consults:   Consults (From admission, onward)          Status Ordering Provider     Inpatient consult to  Cardiology  Once        Provider:  (Not yet assigned)    Completed VIMAL CHU     Inpatient consult to Cardiology  Once        Provider:  (Not yet assigned)    Completed VIMAL CHU     Inpatient consult to Nephrology  Once        Provider:  (Not yet assigned)    Completed DEMETRA MARTIN            Physical Exam  Vitals and nursing note reviewed.   Constitutional:       General: He is not in acute distress.     Appearance: He is not ill-appearing, toxic-appearing or diaphoretic.   HENT:      Head: Normocephalic.   Eyes:      General: No scleral icterus.     Pupils: Pupils are equal, round, and reactive to light.   Cardiovascular:      Rate and Rhythm: Regular rhythm. Tachycardia present.      Heart sounds: Murmur heard.      Arteriovenous access: Right arteriovenous access is present.     Comments: +thrill  Pulmonary:      Effort: No respiratory distress.      Breath sounds: Normal breath sounds. No wheezing, rhonchi or rales.   Chest:      Chest wall: No tenderness.   Abdominal:      General: There is no distension.      Palpations: Abdomen is soft.      Tenderness: There is no abdominal tenderness. There is no guarding.   Musculoskeletal:         General: Normal range of motion.      Cervical back: Normal range of motion.      Right lower leg: No edema.      Left lower leg: No edema.   Skin:     General: Skin is warm and dry.      Capillary Refill: Capillary refill takes less than 2 seconds.   Neurological:      Mental Status: He is alert and oriented to person, place, and time. Mental status is at baseline.   Psychiatric:         Mood and Affect: Mood normal.         Behavior: Behavior normal.     * NSTEMI (non-ST elevated myocardial infarction)  Patient with known CAD s/p stent placement, which is uncontrolled Will continue ASA, Plavix, and Statin and monitor for S/Sx of angina/ACS. Continue to monitor on telemetry. Trop peaked at 7200 am 3/2. Patient currently on hep gtt.     Chest pain  Acute,  patient reports chest pain was resolved on admit. Described as left lower chest wall pain that is sharp in nature. No associated SOB, N/V, or diaphoresis. Concern for MSK component. Will give tylenol and gabapentin. EKG without ST-segment elevation or depression, HR 89.  Troponin 349>>333>>339 (chronically elevated per chart review). BNP 1,072. CXR with cardiomegaly and perihilar edema.   Recently admitted at [facility] with CP and underwent LHC (see below). No culprit lesion was identified. Cardiology recommended maximal medical therapy given extensive multivessel coronary artery disease which is not amenable to revascularization (either PCI or bypass surgery - transmural scar in distal LAD/left circumflex/PLB noted on PET scan done in October, 2024).  - Continue ASA, statin, plavix, BB, imdur, and ranexa.  - Cardiac monitoring.  - PRN EKG and sl nitro for chest pain  - cards consult as chest pain has recurred >> Concern for new NSTEMI > started on hep drip afternoon 3/2    -ECHO 2/10/25:    Left Ventricle: The left ventricle is mildly dilated. Mildly increased wall thickness. There is mild concentric hypertrophy. There is low normal systolic function with a visually estimated ejection fraction of 50 - 55%. Grade II diastolic dysfunction.    Right Ventricle: Normal right ventricular cavity size. Systolic function is normal.    Left Atrium: Left atrium is mildly dilated.    Aortic Valve: The aortic valve is a trileaflet valve. There is moderate aortic valve sclerosis.    Mitral Valve: There is mild to moderate regurgitation.    Tricuspid Valve: There is mild regurgitation.    -Coronary angiography 2/10/25:     Severe multivessel coronary artery disease is present    Distal left main had 30-40% calcific stenosis    Proximal LAD had 70-80% calcific stenosis and 100% stenosis in mid to distal portion.  Distal LAD was getting filled via septal collaterals    2nd diagonal artery had 90% stenosis in midportion    Mid left  "circumflex artery had 40% calcific stenosis.  Stenosis severity can not be determined accurately due to presence of eccentric calcification    Mid RCA had 90% InStent restenosis followed by chronic total occlusion.  Distal RCA was getting filled via right to right collaterals and left-to-right collaterals    Right common femoral arterial access was closed with a Perclose device    Low normal LVEDP (7 mmHg)    Type 2 diabetes mellitus with chronic kidney disease on chronic dialysis, without long-term current use of insulin  Patient's FSGs are controlled with diet alone.   Last A1c reviewed-   Lab Results   Component Value Date    HGBA1C 5.1 01/08/2025     Most recent fingerstick glucose reviewed- No results for input(s): "POCTGLUCOSE" in the last 24 hours.  Current correctional scale  Low  Maintain anti-hyperglycemic dose as follows-   Antihyperglycemics (From admission, onward)      None        - Deferring SSI & CBGs given euglycemia   - Hypoglycemic protocol     PAD (peripheral artery disease)  History noted.  - Continue ASA, statin, and plavix.    Chronic diastolic heart failure  Patient is identified as having Diastolic (HFpEF) heart failure that is Acute on Chronic. CHF is currently uncontrolled due to Pulmonary edema/pleural effusion on CXR. Latest ECHO performed and demonstrates- Results for orders placed during the hospital encounter of 02/09/25    Echo    Interpretation Summary    Left Ventricle: The left ventricle is mildly dilated. Mildly increased wall thickness. There is mild concentric hypertrophy. There is low normal systolic function with a visually estimated ejection fraction of 50 - 55%. Grade II diastolic dysfunction.    Right Ventricle: Normal right ventricular cavity size. Systolic function is normal.    Left Atrium: Left atrium is mildly dilated.    Aortic Valve: The aortic valve is a trileaflet valve. There is moderate aortic valve sclerosis.    Mitral Valve: There is mild to moderate " regurgitation.    Tricuspid Valve: There is mild regurgitation.  . Continue Beta Blocker Nitrate/Vasodilator and monitor clinical status closely. Monitor on telemetry. Patient is off CHF pathway.  Monitor strict Is&Os and daily weights.  Place on fluid restriction of 1.5 L. Continue to stress to patient importance of self efficacy and  on diet for CHF. Last BNP reviewed- and noted below   Recent Labs   Lab 02/28/25  1214   BNP 1,072*     -CXR with cardiomegaly and pulmonary edema.  -No peripheral edema on exam.  -Volume management per HD.    Chronic kidney disease-mineral and bone disorder  History noted.  - Continue renvela.    Anemia in end-stage renal disease  Anemia is likely due to chronic disease due to ESRD. Most recent hemoglobin and hematocrit are listed below.  Recent Labs     03/02/25  1506 03/03/25  0432 03/04/25  0428   HGB 8.4* 8.5*  8.5* 8.1*   HCT 26.3* 26.9*  26.9* 25.6*       Plan  - Monitor serial CBC: Daily  - Transfuse PRBC if patient becomes hemodynamically unstable, symptomatic or H/H drops below 7/21.  - Patient has not received any PRBC transfusions to date  - Patient's anemia is currently stable    Essential hypertension  Patient's blood pressure range in the last 24 hours was: BP  Min: 90/58  Max: 111/68.The patient's inpatient anti-hypertensive regimen is listed below:  Current Antihypertensives  carvediloL tablet 25 mg, 2 times daily with meals, Oral  ranolazine 12 hr tablet 500 mg, Daily, Oral  minoxidiL tablet 10 mg, 2 times daily, Oral  isosorbide mononitrate 24 hr tablet 120 mg, Daily, Oral  nitroGLYCERIN SL tablet 0.4 mg, Every 5 min PRN, Sublingual  nitroGLYCERIN (NITROSTAT) SL tablet, Every 5 min PRN, Sublingual  NIFEdipine 24 hr tablet 60 mg, 2 times daily, Oral  NIFEdipine (PROCARDIA-XL) 24 hr tablet, 2 times daily, Oral    Plan  - BP is controlled, no changes needed to their regimen    ESRD on hemodialysis  MWF schedule, dialyzed today but stopped after 1hr and 45min  due to CP  Residual renal function?- No    - Nephrology consulted  - Continue chronic hemodialysis  - Monitor daily electrolytes and defer dialysis orders to nephrology  - Renally dose medications  - Renal diet  - Continue phosphorus binders  - Daily weights/strict I&Os  - 1.5L FR     Hemiparesis affecting right side as late effect of cerebrovascular accident  History noted.  - Continue ASA, statin, and plavix.  - Fall precautions.      Final Active Diagnoses:    Diagnosis Date Noted POA    PRINCIPAL PROBLEM:  NSTEMI (non-ST elevated myocardial infarction) [I21.4] 02/09/2025 Yes    Chest pain [R07.9] 03/01/2025 Yes    Type 2 diabetes mellitus with chronic kidney disease on chronic dialysis, without long-term current use of insulin [E11.22, N18.6, Z99.2] 03/10/2024 Not Applicable    PAD (peripheral artery disease) [I73.9] 03/10/2024 Yes    Chronic diastolic heart failure [I50.32] 05/02/2023 Yes    Chronic kidney disease-mineral and bone disorder [N18.9, E83.9, M89.9] 06/14/2022 Yes    Anemia in end-stage renal disease [N18.6, D63.1] 06/14/2022 Yes    Essential hypertension [I10] 11/21/2019 Yes     Chronic    ESRD on hemodialysis [N18.6, Z99.2] 07/23/2019 Not Applicable    Hemiparesis affecting right side as late effect of cerebrovascular accident [I69.351] 02/15/2016 Not Applicable      Problems Resolved During this Admission:       Discharged Condition: stable    Disposition:     Follow Up:    Patient Instructions:      Ambulatory referral/consult to Cardiology   Standing Status: Future   Referral Priority: Urgent Referral Type: Consultation   Referral Reason: Specialty Services Required   Requested Specialty: Cardiology   Number of Visits Requested: 1       Significant Diagnostic Studies: N/A    Pending Diagnostic Studies:       None           Medications:  Reconciled Home Medications:      Medication List        START taking these medications      methocarbamoL 500 MG Tab  Commonly known as: Robaxin  Take 1 tablet  (500 mg total) by mouth 4 (four) times daily as needed (muscle spasms).            CHANGE how you take these medications      NIFEdipine 60 MG (OSM) 24 hr tablet  Commonly known as: PROCARDIA-XL  Take 1 tablet (60 mg total) by mouth 2 (two) times a day.  What changed:   medication strength  how much to take  when to take this            CONTINUE taking these medications      aspirin 81 MG EC tablet  Commonly known as: ECOTRIN  Take 1 tablet (81 mg total) by mouth once daily.     atorvastatin 80 MG tablet  Commonly known as: LIPITOR  Take 1 tablet (80 mg total) by mouth once daily.     carvediloL 25 MG tablet  Commonly known as: COREG  Take 1 tablet (25 mg total) by mouth 2 (two) times daily with meals.     clopidogreL 75 mg tablet  Commonly known as: PLAVIX  Take 1 tablet (75 mg total) by mouth once daily.     ezetimibe 10 mg tablet  Commonly known as: ZETIA  Take 1 tablet (10 mg total) by mouth once daily.     hydrALAZINE 25 MG tablet  Commonly known as: APRESOLINE  Take 1 tablet (25 mg total) by mouth every 8 (eight) hours.     isosorbide mononitrate 120 MG 24 hr tablet  Commonly known as: IMDUR  Take 1 tablet (120 mg total) by mouth once daily.     minoxidiL 10 MG Tab  Commonly known as: LONITEN  Take 1 tablet (10 mg total) by mouth 2 (two) times daily.     nitroGLYCERIN 0.4 MG SL tablet  Commonly known as: NITROSTAT  Place 1 tablet (0.4 mg total) under the tongue every 5 (five) minutes as needed for Chest pain.     ranolazine 500 MG Tb12  Commonly known as: RANEXA  Take 1 tablet (500 mg total) by mouth Daily.     sevelamer carbonate 800 mg Tab  Commonly known as: RENVELA  Take 2 tablets (1,600 mg total) by mouth 3 (three) times daily with meals.            ASK your doctor about these medications      epoetin philip 4,000 unit/mL injection  Commonly known as: PROCRIT  Inject 1.93 mLs (7,720 Units total) into the skin every Mon, Wed, Fri.     MIRCERA INJ  150 mcg.     XPHOZAH 20 mg Tab  Generic drug: tenapanor  Take  1 tablet by mouth.              Indwelling Lines/Drains at time of discharge:   Lines/Drains/Airways       Drain  Duration                  Hemodialysis AV Fistula Right forearm -- days                    Time spent on the discharge of patient: 35 minutes         Anjana Maharaj MD  Department of Hospital Medicine  [facility] 11H

## 2025-03-04 NOTE — ASSESSMENT & PLAN NOTE
Patient's blood pressure range in the last 24 hours was: BP  Min: 90/58  Max: 111/68.The patient's inpatient anti-hypertensive regimen is listed below:  Current Antihypertensives  carvediloL tablet 25 mg, 2 times daily with meals, Oral  ranolazine 12 hr tablet 500 mg, Daily, Oral  minoxidiL tablet 10 mg, 2 times daily, Oral  isosorbide mononitrate 24 hr tablet 120 mg, Daily, Oral  nitroGLYCERIN SL tablet 0.4 mg, Every 5 min PRN, Sublingual  nitroGLYCERIN (NITROSTAT) SL tablet, Every 5 min PRN, Sublingual  NIFEdipine 24 hr tablet 60 mg, 2 times daily, Oral  NIFEdipine (PROCARDIA-XL) 24 hr tablet, 2 times daily, Oral    Plan  - BP is controlled, no changes needed to their regimen

## 2025-03-04 NOTE — PLAN OF CARE
Problem: Adult Inpatient Plan of Care  Goal: Plan of Care Review  Outcome: Adequate for Care Transition  Goal: Patient-Specific Goal (Individualized)  Outcome: Adequate for Care Transition  Goal: Absence of Hospital-Acquired Illness or Injury  Outcome: Adequate for Care Transition  Goal: Optimal Comfort and Wellbeing  Outcome: Adequate for Care Transition  Goal: Readiness for Transition of Care  Outcome: Adequate for Care Transition     Problem: Diabetes Comorbidity  Goal: Blood Glucose Level Within Targeted Range  Outcome: Adequate for Care Transition     Problem: Comorbidity Management  Goal: Blood Pressure in Desired Range  Outcome: Adequate for Care Transition  Goal: Maintenance of Heart Failure Symptom Control  Outcome: Adequate for Care Transition     Problem: Chest Pain  Goal: Resolution of Chest Pain Symptoms  Outcome: Adequate for Care Transition     Problem: Hemodialysis  Goal: Safe, Effective Therapy Delivery  Outcome: Adequate for Care Transition  Goal: Effective Tissue Perfusion  Outcome: Adequate for Care Transition  Goal: Absence of Infection Signs and Symptoms  Outcome: Adequate for Care Transition     Problem: Fall Injury Risk  Goal: Absence of Fall and Fall-Related Injury  Outcome: Adequate for Care Transition     Problem: Chronic Kidney Disease  Goal: Optimal Coping with Chronic Illness  Outcome: Adequate for Care Transition  Goal: Electrolyte Balance  Outcome: Adequate for Care Transition  Goal: Fluid Balance  Outcome: Adequate for Care Transition  Goal: Optimal Functional Ability  Outcome: Adequate for Care Transition  Goal: Absence of Anemia Signs and Symptoms  Outcome: Adequate for Care Transition  Goal: Optimal Oral Intake  Outcome: Adequate for Care Transition  Goal: Acceptable Pain Control  Outcome: Adequate for Care Transition  Goal: Minimize Renal Failure Effects  Outcome: Adequate for Care Transition     Problem: Mobility Impairment  Goal: Optimal Mobility  Outcome: Adequate for Care  Transition     Problem: Anemia  Goal: Anemia Symptom Improvement  Outcome: Adequate for Care Transition

## 2025-03-04 NOTE — NURSING
Pt discharged home with friend via private vehicle. IV dc'd. Telemetry dc'd. DC instructions reviewed with patient. Pt verbalized understanding.

## 2025-03-05 NOTE — PHYSICIAN QUERY
Provider, due to conflicting documentation, please clarify the acuity of the diastolic heart failure.     Chronic diastolic heart failure (HFpEF)

## 2025-03-10 ENCOUNTER — HOSPITAL ENCOUNTER (INPATIENT)
Facility: HOSPITAL | Age: 50
LOS: 1 days | Discharge: HOME | DRG: 291 | End: 2025-03-12
Attending: EMERGENCY MEDICINE | Admitting: STUDENT IN AN ORGANIZED HEALTH CARE EDUCATION/TRAINING PROGRAM
Payer: MEDICARE

## 2025-03-10 DIAGNOSIS — I25.118 CORONARY ARTERY DISEASE OF NATIVE ARTERY OF NATIVE HEART WITH STABLE ANGINA PECTORIS: ICD-10-CM

## 2025-03-10 DIAGNOSIS — Z99.2 ANEMIA DUE TO CHRONIC KIDNEY DISEASE, ON CHRONIC DIALYSIS: Primary | ICD-10-CM

## 2025-03-10 DIAGNOSIS — R06.02 SHORTNESS OF BREATH: ICD-10-CM

## 2025-03-10 DIAGNOSIS — J96.01 ACUTE HYPOXEMIC RESPIRATORY FAILURE: ICD-10-CM

## 2025-03-10 DIAGNOSIS — R07.9 CHEST PAIN: ICD-10-CM

## 2025-03-10 DIAGNOSIS — N18.6 ANEMIA DUE TO CHRONIC KIDNEY DISEASE, ON CHRONIC DIALYSIS: Primary | ICD-10-CM

## 2025-03-10 DIAGNOSIS — R60.0 LOWER EXTREMITY EDEMA: ICD-10-CM

## 2025-03-10 DIAGNOSIS — D63.1 ANEMIA DUE TO CHRONIC KIDNEY DISEASE, ON CHRONIC DIALYSIS: Primary | ICD-10-CM

## 2025-03-10 LAB
ALBUMIN SERPL BCP-MCNC: 2.1 G/DL (ref 3.5–5.2)
ALP SERPL-CCNC: 77 U/L (ref 40–150)
ALT SERPL W/O P-5'-P-CCNC: 14 U/L (ref 10–44)
ANION GAP SERPL CALC-SCNC: 15 MMOL/L (ref 8–16)
AST SERPL-CCNC: 18 U/L (ref 10–40)
BASOPHILS # BLD AUTO: 0.07 K/UL (ref 0–0.2)
BASOPHILS NFR BLD: 0.6 % (ref 0–1.9)
BILIRUB SERPL-MCNC: 0.7 MG/DL (ref 0.1–1)
BIPAP: 0
BNP SERPL-MCNC: 3282 PG/ML (ref 0–99)
BUN SERPL-MCNC: 76 MG/DL (ref 6–30)
BUN SERPL-MCNC: 85 MG/DL (ref 6–20)
CALCIUM SERPL-MCNC: 8.2 MG/DL (ref 8.7–10.5)
CHLORIDE SERPL-SCNC: 106 MMOL/L (ref 95–110)
CHLORIDE SERPL-SCNC: 107 MMOL/L (ref 95–110)
CO2 SERPL-SCNC: 14 MMOL/L (ref 23–29)
CORRECTED TEMPERATURE (PCO2): 32.7 MMHG
CORRECTED TEMPERATURE (PH): 7.38
CORRECTED TEMPERATURE (PO2): 47.2 MMHG
CREAT SERPL-MCNC: 10.3 MG/DL (ref 0.5–1.4)
CREAT SERPL-MCNC: 11.5 MG/DL (ref 0.5–1.4)
D DIMER PPP IA.FEU-MCNC: 2.25 MG/L FEU
DIFFERENTIAL METHOD BLD: ABNORMAL
EOSINOPHIL # BLD AUTO: 0.1 K/UL (ref 0–0.5)
EOSINOPHIL NFR BLD: 0.6 % (ref 0–8)
ERYTHROCYTE [DISTWIDTH] IN BLOOD BY AUTOMATED COUNT: 17.2 % (ref 11.5–14.5)
EST. GFR  (NO RACE VARIABLE): 5.7 ML/MIN/1.73 M^2
FIO2: 21 %
GLUCOSE SERPL-MCNC: 214 MG/DL (ref 70–110)
GLUCOSE SERPL-MCNC: 216 MG/DL (ref 70–110)
HCT VFR BLD AUTO: 20.6 % (ref 40–54)
HCT VFR BLD CALC: 21 %PCV (ref 36–54)
HGB BLD-MCNC: 6.2 G/DL (ref 14–18)
IMM GRANULOCYTES # BLD AUTO: 0.1 K/UL (ref 0–0.04)
IMM GRANULOCYTES NFR BLD AUTO: 0.8 % (ref 0–0.5)
LYMPHOCYTES # BLD AUTO: 0.9 K/UL (ref 1–4.8)
LYMPHOCYTES NFR BLD: 7.6 % (ref 18–48)
MAGNESIUM SERPL-MCNC: 1.7 MG/DL (ref 1.6–2.6)
MCH RBC QN AUTO: 29.5 PG (ref 27–31)
MCHC RBC AUTO-ENTMCNC: 30.1 G/DL (ref 32–36)
MCV RBC AUTO: 98 FL (ref 82–98)
MONOCYTES # BLD AUTO: 0.6 K/UL (ref 0.3–1)
MONOCYTES NFR BLD: 5.1 % (ref 4–15)
NEUTROPHILS # BLD AUTO: 10.1 K/UL (ref 1.8–7.7)
NEUTROPHILS NFR BLD: 85.3 % (ref 38–73)
NRBC BLD-RTO: 0 /100 WBC
PCO2 BLDA: 32.7 MMHG
PH SMN: 7.38 [PH]
PHOSPHATE SERPL-MCNC: 5.1 MG/DL (ref 2.7–4.5)
PLATELET # BLD AUTO: 308 K/UL (ref 150–450)
PMV BLD AUTO: 9.3 FL (ref 9.2–12.9)
PO2 BLDA: 47.2 MMHG
POC BASE DEFICIT: -5.5 MMOL/L
POC HCO3: 19.6 MMOL/L
POC IONIZED CALCIUM: 1.14 MMOL/L (ref 1.06–1.42)
POC PERFORMED BY: NORMAL
POC TCO2 (MEASURED): 18 MMOL/L (ref 23–29)
POC TEMPERATURE: 37 C
POTASSIUM BLD-SCNC: 4.5 MMOL/L (ref 3.5–5.1)
POTASSIUM SERPL-SCNC: 4.5 MMOL/L (ref 3.5–5.1)
PROT SERPL-MCNC: 6.2 G/DL (ref 6–8.4)
RBC # BLD AUTO: 2.1 M/UL (ref 4.6–6.2)
SAMPLE: ABNORMAL
SARS-COV-2 RDRP RESP QL NAA+PROBE: NEGATIVE
SODIUM BLD-SCNC: 137 MMOL/L (ref 136–145)
SODIUM SERPL-SCNC: 136 MMOL/L (ref 136–145)
SPECIMEN SOURCE: NORMAL
TROPONIN I SERPL DL<=0.01 NG/ML-MCNC: 2502 NG/L (ref 0–35)
WBC # BLD AUTO: 11.79 K/UL (ref 3.9–12.7)

## 2025-03-10 PROCEDURE — 80053 COMPREHEN METABOLIC PANEL: CPT | Mod: HCNC

## 2025-03-10 PROCEDURE — 93005 ELECTROCARDIOGRAM TRACING: CPT | Mod: HCNC

## 2025-03-10 PROCEDURE — 80047 BASIC METABLC PNL IONIZED CA: CPT | Mod: HCNC

## 2025-03-10 PROCEDURE — 30233N1 TRANSFUSION OF NONAUTOLOGOUS RED BLOOD CELLS INTO PERIPHERAL VEIN, PERCUTANEOUS APPROACH: ICD-10-PCS | Performed by: STUDENT IN AN ORGANIZED HEALTH CARE EDUCATION/TRAINING PROGRAM

## 2025-03-10 PROCEDURE — 84484 ASSAY OF TROPONIN QUANT: CPT | Mod: HCNC

## 2025-03-10 PROCEDURE — 5A1D70Z PERFORMANCE OF URINARY FILTRATION, INTERMITTENT, LESS THAN 6 HOURS PER DAY: ICD-10-PCS | Performed by: STUDENT IN AN ORGANIZED HEALTH CARE EDUCATION/TRAINING PROGRAM

## 2025-03-10 PROCEDURE — 85379 FIBRIN DEGRADATION QUANT: CPT | Mod: HCNC

## 2025-03-10 PROCEDURE — 99900035 HC TECH TIME PER 15 MIN (STAT): Mod: HCNC

## 2025-03-10 PROCEDURE — 82803 BLOOD GASES ANY COMBINATION: CPT | Mod: HCNC

## 2025-03-10 PROCEDURE — 25500020 PHARM REV CODE 255: Mod: HCNC | Performed by: EMERGENCY MEDICINE

## 2025-03-10 PROCEDURE — 99285 EMERGENCY DEPT VISIT HI MDM: CPT | Mod: 25,HCNC

## 2025-03-10 PROCEDURE — 86901 BLOOD TYPING SEROLOGIC RH(D): CPT | Mod: HCNC

## 2025-03-10 PROCEDURE — 87635 SARS-COV-2 COVID-19 AMP PRB: CPT | Mod: HCNC | Performed by: EMERGENCY MEDICINE

## 2025-03-10 PROCEDURE — 93010 ELECTROCARDIOGRAM REPORT: CPT | Mod: HCNC,,, | Performed by: INTERNAL MEDICINE

## 2025-03-10 PROCEDURE — 83880 ASSAY OF NATRIURETIC PEPTIDE: CPT | Mod: HCNC

## 2025-03-10 PROCEDURE — 85025 COMPLETE CBC W/AUTO DIFF WBC: CPT | Mod: HCNC

## 2025-03-10 PROCEDURE — 83735 ASSAY OF MAGNESIUM: CPT | Mod: HCNC

## 2025-03-10 PROCEDURE — 84100 ASSAY OF PHOSPHORUS: CPT | Mod: HCNC

## 2025-03-10 RX ORDER — HYDROCODONE BITARTRATE AND ACETAMINOPHEN 500; 5 MG/1; MG/1
TABLET ORAL
Status: DISCONTINUED | OUTPATIENT
Start: 2025-03-10 | End: 2025-03-12 | Stop reason: HOSPADM

## 2025-03-10 RX ADMIN — IOHEXOL 75 ML: 350 INJECTION, SOLUTION INTRAVENOUS at 10:03

## 2025-03-10 NOTE — ED TRIAGE NOTES
Arrives to ED via EMS from home c/o SOB. Hx of CHF and bilateral nephrectomy. Last dialysis on Friday. 92% on 6L. Bilateral rales. Arrives on CPAP. Received 2 doses nitro.

## 2025-03-10 NOTE — ED NOTES
I-STAT Chem-8+ Results:   Value Reference Range   Sodium 137 136-145 mmol/L   Potassium  4.5 3.5-5.1 mmol/L   Chloride 106  mmol/L   Ionized Calcium 1.14 1.06-1.42 mmol/L   CO2 (measured) 18 23-29 mmol/L   Glucose 216  mg/dL   BUN 76 6-30 mg/dL   Creatinine 11.5 0.5-1.4 mg/dL   Hematocrit 21 36-54%

## 2025-03-10 NOTE — Clinical Note
Diagnosis: Anemia due to chronic kidney disease, on chronic dialysis [[id number]]   Future Attending Provider: ALE ZUNIGA [[id number]]   Is the patient being sent to ED Observation?: No   Special Needs:: No Special Needs [1]

## 2025-03-10 NOTE — ED PROVIDER NOTES
Encounter Date: 3/10/2025       History     Chief Complaint   Patient presents with    Shortness of Breath     Arrived via EMS on CPAP     49-year-old male with past medical history of CAD, HTN, hyperparathyroidism,  DM C/B diabetic retinopathy, renal cell carcinoma, and ESRD on HD (MWF) who presents to the emergency with complaints of shortness of breath.  Per EMS, called for SOB found to be hypoxic to high 80s - 90% on 3L NC therefore transitioned to CPAP.  Patient found to be hypertensive to the 180s therefore given nitro x2 with maps greater than 65.  Reports patient last Wednesday this may need a dialysis session.  Noted to have bilateral pulmonary B-lines and small pericardial effusion without ventricular collapsibility route with ultrasound.  Patient reports when shortness breath after being placed on CPAP.  Reports he missed dialysis today 2/2 weakness.  Complains of bilateral lower extremity swelling but denies fever, chills, nausea, vomiting, chest pain, abdominal pain, or trauma.    The history is provided by the patient and the EMS personnel.     Review of patient's allergies indicates:   Allergen Reactions    No known allergies      Past Medical History:   Diagnosis Date    CAD (coronary artery disease), native coronary artery 11/21/2019    Cataract     Diabetes mellitus     Diabetic retinopathy     Dialysis patient     DM type 2 causing renal disease, not at goal     ESRD (end stage renal disease) started dialysis 01/2014 06/05/2014    H/o acute coronary syndrome with high troponin 03/11/2024    History of colon polyps 02/10/2021    History of COVID-19 12/29/2022    Hyperparathyroidism, secondary renal 06/05/2014    Hypertension     Hypertensive emergency 11/27/2023    NSTEMI (non-ST elevated myocardial infarction) 12/21/2013    Organ transplant candidate 06/05/2014    Palliative care encounter 10/29/2024    Pleural effusion 06/07/2024    Pneumonia     Post PTCA 08/26/2020    RCA [facility] 7-25-20    Renal  cell carcinoma of right kidney     Renal hypertension     Stroke      Past Surgical History:   Procedure Laterality Date    ANGIOGRAM, CORONARY, WITH LEFT HEART CATHETERIZATION N/A 7/1/2021    Procedure: Angiogram, Coronary, with Left Heart Cath;  Surgeon: Miki Zendejas MD;  Location: [facility] CATH LAB;  Service: Cardiology;  Laterality: N/A;    ANGIOGRAM, CORONARY, WITH LEFT HEART CATHETERIZATION N/A 11/28/2023    Procedure: Angiogram, Coronary, with Left Heart Cath;  Surgeon: Noah Pendleton MD;  Location: [facility] CATH LAB;  Service: Cardiology;  Laterality: N/A;    COLONOSCOPY N/A 5/3/2017    Procedure: COLONOSCOPY;  Surgeon: Rufino Carpenter MD;  Location: [facility] ENDO (4TH FLR);  Service: Endoscopy;  Laterality: N/A;  pt states can only schedule on Wednesdays    COLONOSCOPY N/A 2/9/2021    Procedure: COLONOSCOPY;  Surgeon: Edil Wong MD;  Location: [facility] ENDO (4TH FLR);  Service: Endoscopy;  Laterality: N/A;  Dialysis MWF/ labwork day of procedure  right arm aceess  per Dr. Colin pt can hold Plavix 5 days prior see note- sm  COVID test on 2/6/21 at [facility] - sm    COLONOSCOPY N/A 2/10/2021    Procedure: COLONOSCOPY;  Surgeon: Robert Ayers MD;  Location: [facility] (4TH FLR);  Service: Endoscopy;  Laterality: N/A;  rescheduled due to poor bowel prep-BB  negative covid screening 2/6/21-BB  dialysis M-W-F-BB  okay to r/s for 2/9/21 and to hold Plavix per Dr. KIRAN Wong-BB  labs same day-BB    CORONARY ANGIOGRAPHY N/A 2/10/2025    Procedure: ANGIOGRAM, CORONARY ARTERY;  Surgeon: Ajith Hutchins MD;  Location: [facility] CATH LAB;  Service: Cardiology;  Laterality: N/A;    CORONARY STENT PLACEMENT N/A 7/25/2019    Procedure: INSERTION, STENT, CORONARY ARTERY;  Surgeon: Miki Zendejas MD;  Location: [facility] CATH LAB;  Service: Cardiology;  Laterality: N/A;    DIALYSIS FISTULA CREATION      FISTULOGRAM N/A 2/18/2019    Procedure: Fistulogram;  Surgeon: Yaneth De La Cruz MD;  Location: [facility] CATH LAB;  Service: Cardiology;   Laterality: N/A;    FISTULOGRAM Right 7/23/2019    Procedure: Fistulogram;  Surgeon: Oz Cordoba MD;  Location: [facility] CATH LAB;  Service: Cardiology;  Laterality: Right;    LAPAROSCOPIC ROBOT-ASSISTED SURGICAL REMOVAL OF KIDNEY USING DA JAIME XI Right 5/19/2022    Procedure: XI ROBOTIC NEPHRECTOMY;  Surgeon: Aidan Hendricks MD;  [facility];  Service: Urology;  Laterality: Right;  3hrs    LAPAROSCOPIC ROBOT-ASSISTED SURGICAL REMOVAL OF KIDNEY USING DA JAIME XI Left 1/5/2023    Procedure: XI ROBOTIC NEPHRECTOMY;  Surgeon: Aidan Hendricks MD;  [facility];  Service: Urology;  Laterality: Left;  4 hrs    LEFT HEART CATHETERIZATION Left 7/25/2019    Procedure: Left heart cath;  Surgeon: Miki Zendejas MD;  Location: [facility] CATH LAB;  Service: Cardiology;  Laterality: Left;    LEFT HEART CATHETERIZATION Left 2/10/2025    Procedure: Left heart cath;  Surgeon: Ajith Hutchins MD;  Location: [facility] CATH LAB;  Service: Cardiology;  Laterality: Left;    REPAIR  1/5/2023    Procedure: REPAIR; COLOTOMY;  Surgeon: Maikel Lamb MD;  Location: [address];  Service: General;;    RETINAL LASER PROCEDURE Bilateral 2018 or 2017    Dr. Rothman    VASCULAR SURGERY       Family History   Problem Relation Name Age of Onset    Hypertension Mother      Diabetes Mother      Cataracts Mother      Hypertension Father      Hypertension Sister      Kidney disease Brother      Hypertension Brother      Heart disease Brother      Cancer Maternal Grandfather          Colon CA    Colon cancer Neg Hx      Esophageal cancer Neg Hx      Stomach cancer Neg Hx      Rectal cancer Neg Hx      Ulcerative colitis Neg Hx      Irritable bowel syndrome Neg Hx      Crohn's disease Neg Hx      Celiac disease Neg Hx      Glaucoma Neg Hx      Macular degeneration Neg Hx       Social History[1]  Review of Systems   Reason unable to perform ROS: As per HPI.       Physical Exam     Initial Vitals   BP Pulse Resp  Temp SpO2   03/10/25 1700 03/10/25 1700 03/10/25 1700 03/10/25 1900 03/10/25 1700   (!) 98/55 91 (!) 24 97.8 °F (36.6 °C) 98 %      MAP       --                Physical Exam    Constitutional: He is not diaphoretic. He is cooperative. He appears ill.   HENT:   Head: Normocephalic and atraumatic.   Eyes: No scleral icterus.   Neck: No JVD present.   Pulmonary/Chest: No tachypnea.   Abdominal: Abdomen is soft. He exhibits no distension. There is no abdominal tenderness.   Musculoskeletal:         General: Edema (pitting edema bilateral lower extremity) present.     Neurological: He is alert.   Skin: Skin is warm.         ED Course   Critical Care    Date/Time: 3/11/2025 2:48 PM    Performed by: Raoul Fletcher MD  Authorized by: Raoul Fletcher MD  Direct patient critical care time: 35 minutes  Total critical care time (exclusive of procedural time) : 35 minutes  Critical care time was exclusive of separately billable procedures and treating other patients and teaching time.  Critical care was necessary to treat or prevent imminent or life-threatening deterioration of the following conditions: cardiac failure, circulatory failure and respiratory failure.  Critical care was time spent personally by me on the following activities: blood draw for specimens, development of treatment plan with patient or surrogate, discussions with consultants, interpretation of cardiac output measurements, evaluation of patient's response to treatment, examination of patient, obtaining history from patient or surrogate, ordering and performing treatments and interventions, ordering and review of laboratory studies, ordering and review of radiographic studies, pulse oximetry, re-evaluation of patient's condition and review of old charts.        Labs Reviewed   CBC W/ AUTO DIFFERENTIAL - Abnormal       Result Value    WBC 11.79      RBC 2.10 (*)     Hemoglobin 6.2 (*)     Hematocrit 20.6 (*)     MCV 98      MCH 29.5      MCHC 30.1 (*)     RDW  17.2 (*)     Platelets 308      MPV 9.3      Immature Granulocytes 0.8 (*)     Gran # (ANC) 10.1 (*)     Immature Grans (Abs) 0.10 (*)     Lymph # 0.9 (*)     Mono # 0.6      Eos # 0.1      Baso # 0.07      nRBC 0      Gran % 85.3 (*)     Lymph % 7.6 (*)     Mono % 5.1      Eosinophil % 0.6      Basophil % 0.6      Differential Method Automated     COMPREHENSIVE METABOLIC PANEL - Abnormal    Sodium 136      Potassium 4.5      Chloride 107      CO2 14 (*)     Glucose 214 (*)     BUN 85 (*)     Creatinine 10.3 (*)     Calcium 8.2 (*)     Total Protein 6.2      Albumin 2.1 (*)     Total Bilirubin 0.7      Alkaline Phosphatase 77      AST 18      ALT 14      eGFR 5.7 (*)     Anion Gap 15     B-TYPE NATRIURETIC PEPTIDE - Abnormal    BNP 3,282 (*)    TROPONIN I HIGH SENSITIVITY - Abnormal    Troponin I High Sensitivity 2502 (*)    PHOSPHORUS - Abnormal    Phosphorus 5.1 (*)    D DIMER, QUANTITATIVE - Abnormal    D-Dimer 2.25 (*)    COMPREHENSIVE METABOLIC PANEL - Abnormal    Sodium 138      Potassium 3.6      Chloride 99      CO2 25      Glucose 172 (*)     BUN 51 (*)     Creatinine 7.4 (*)     Calcium 9.2      Total Protein 7.2      Albumin 2.4 (*)     Total Bilirubin 0.8      Alkaline Phosphatase 87      AST 18      ALT 15      eGFR 8.5 (*)     Anion Gap 14     CBC W/ AUTO DIFFERENTIAL - Abnormal    WBC 12.10      RBC 2.87 (*)     Hemoglobin 8.4 (*)     Hematocrit 25.8 (*)     MCV 90      MCH 29.3      MCHC 32.6      RDW 16.2 (*)     Platelets 355      MPV 9.4      Immature Granulocytes 0.7 (*)     Gran # (ANC) 9.8 (*)     Immature Grans (Abs) 0.09 (*)     Lymph # 0.9 (*)     Mono # 0.7      Eos # 0.5      Baso # 0.09      nRBC 0      Gran % 81.3 (*)     Lymph % 7.4 (*)     Mono % 5.9      Eosinophil % 4.0      Basophil % 0.7      Differential Method Automated     CBC W/ AUTO DIFFERENTIAL - Abnormal    WBC 11.11      RBC 2.67 (*)     Hemoglobin 7.7 (*)     Hematocrit 23.8 (*)     MCV 89      MCH 28.8      MCHC 32.4       RDW 16.3 (*)     Platelets 356      MPV 9.4      Immature Granulocytes 0.6 (*)     Gran # (ANC) 9.1 (*)     Immature Grans (Abs) 0.07 (*)     Lymph # 0.8 (*)     Mono # 0.7      Eos # 0.5      Baso # 0.07      nRBC 0      Gran % 81.6 (*)     Lymph % 7.2 (*)     Mono % 5.9      Eosinophil % 4.1      Basophil % 0.6      Differential Method Automated     HEMATOCRIT - Abnormal    Hematocrit 23.8 (*)    HEMOGLOBIN - Abnormal    Hemoglobin 7.7 (*)    ISTAT PROCEDURE - Abnormal    POC Glucose 216 (*)     POC BUN 76 (*)     POC Creatinine 11.5 (*)     POC Sodium 137      POC Potassium 4.5      POC Chloride 106      POC TCO2 (MEASURED) 18 (*)     POC Ionized Calcium 1.14      POC Hematocrit 21 (*)     Sample RICHY     ISTAT PROCEDURE - Abnormal    POC Glucose 151 (*)     POC BUN 44 (*)     POC Creatinine 9.4 (*)     POC Sodium 137      POC Potassium 4.0      POC Chloride 96      POC TCO2 (MEASURED) 30 (*)     POC Ionized Calcium 1.16      POC Hematocrit 24 (*)     Sample RICHY     RESPIRATORY INFECTION PANEL (PCR), NASOPHARYNGEAL    Respiratory Infection Panel Source NP Swab      Adenovirus Not Detected      Coronavirus 229E, Common Cold Virus Not Detected      Coronavirus HKU1, Common Cold Virus Not Detected      Coronavirus NL63, Common Cold Virus Not Detected      Coronavirus OC43, Common Cold Virus Not Detected      SARS-CoV2 (COVID-19) Qualitative PCR Not Detected      Human Metapneumovirus Not Detected      Human Rhinovirus/Enterovirus Not Detected      Influenza A Not Detected      Influenza B Not Detected      Parainfluenza Virus 1 Not Detected      Parainfluenza Virus 2 Not Detected      Parainfluenza Virus 3 Not Detected      Parainfluenza Virus 4 Not Detected      Respiratory Syncytial Virus Not Detected      Bordetella Parapertussis (FF1714) Not Detected      Bordetella pertussis (ptxP) Not Detected      Chlamydia pneumoniae Not Detected      Mycoplasma pneumoniae Not Detected      Narrative:     Assay not valid for  lower respiratory specimens, alternate  testing required.   MRSA SCREEN BY PCR    MRSA SCREEN BY PCR Not Detected     CULTURE, RESPIRATORY   MAGNESIUM    Magnesium 1.7     SARS-COV-2 RNA AMPLIFICATION, QUAL    SARS-CoV-2 RNA, Amplification, Qual Negative     MAGNESIUM    Magnesium 1.7     PHOSPHORUS    Phosphorus 3.8     TYPE & SCREEN    Group & Rh O POS      Indirect Elliot NEG      Specimen Outdate 03/13/2025 23:59     ISTAT CHEM8   PREPARE RBC SOFT    UNIT NUMBER [id number]      Product Code [id number]      DISPENSE STATUS ISSUED      CODING SYSTEM [id number]      Unit Blood Type Code 5100      Unit Blood Type O POS      Unit Expiration [id number]      CROSSMATCH INTERPRETATION Compatible          ECG Results              EKG 12-lead (Final result)        Collection Time Result Time QRS Duration OHS QTC Calculation    03/10/25 16:44:16 03/11/25 08:19:23 138 494                     Final result by Interface, Lab In [facility] (03/11/25 08:19:32)                   Narrative:    Test Reason : R06.02,    Vent. Rate :  92 BPM     Atrial Rate :  92 BPM     P-R Int : 172 ms          QRS Dur : 138 ms      QT Int : 400 ms       P-R-T Axes :  68  23 242 degrees    QTcB Int : 494 ms    Normal sinus rhythm  Nonspecific intraventricular block  Nonspecific T wave abnormality  Abnormal ECG  When compared with ECG of 02-Mar-2025 15:04,  QRS duration has increased  Nonspecific T wave abnormality now evident in Inferior leads  Nonspecific T wave abnormality has replaced inverted T waves in Lateral  leads  Confirmed by Zeferino Aguilar ([id number]) on 3/11/2025 8:19:21 AM    Referred By: AAAREFERRAL SELF           Confirmed By: Zeferino Aguilar                                  Imaging Results               CTA Chest Non-Coronary (PE Studies) (Final result)  Result time 03/10/25 23:46:54      Final result by Thuan Khan MD (03/10/25 23:46:54)                   Impression:      Mild cardiomegaly with bilateral airspace disease with left upper and  lower lobe consolidation, patchy infiltrate in the right and bilateral ground-glass infiltrates.  Findings may be associated with pulmonary edema and/or infection/pneumonia.  Follow-up recommended.    8 mm nonspecific pulmonary nodule in the right upper lobe.  See above comments.  See above comments also.    No evidence of pulmonary embolism.    This report was flagged in Epic as abnormal.      Electronically signed by: Thuan Yepez  Date:    03/10/2025  Time:    23:46               Narrative:    EXAMINATION:  CTA CHEST NON CORONARY (PE STUDIES)    CLINICAL HISTORY:  Pulmonary embolism (PE) suspected, positive D-dimer;    TECHNIQUE:  Low dose axial images, sagittal and coronal reformations were obtained from the thoracic inlet to the lung bases following the IV administration of 75 mL of Omnipaque 350.  Contrast timing was optimized to evaluate the pulmonary arteries.  MIP images were performed.    COMPARISON:  None    FINDINGS:  Pulmonary arteries:Pulmonary arteries are adequately enhanced.No filling defects to indicate pulmonary embolism.    Thoracic soft tissues: Unremarkable.    Aorta: Left-sided aortic arch.  No aneurysm or dissection.    Heart: The heart is mildly enlarged.    Lucía/Mediastinum: No pathologic justice enlargement.    Airways: Patent.    Lungs/Pleura: Mild left upper lobe consolidation.  Left lower lobe consolidation also.  Patchy right lower lobe infiltrate.  Ground-glass infiltrates bilaterally most prominent left upper lobe.    Possible 8 mm right upper lobe pulmonary nodule on axial 291 of series 2.    For a solid nodule 6-8 mm, Fleischner Society 2017 guidelines recommend follow up with non-contrast chest CT at 6-12 months and 18-24 months after discovery.    Small pleural effusion posteriorly on the right.    Findings could represent pulmonary edema and/or infection/pneumonia.  Follow-up recommended.    Esophagus: Unremarkable.    Upper Abdomen: No abnormality of the partially imaged upper  abdomen.    Bones: No acute fracture. No suspicious lytic or sclerotic lesions.                                       US Lower Extremity Veins Right (Final result)  Result time 03/10/25 20:41:15      Final result by Oli Barnard MD (03/10/25 20:41:15)                   Impression:      No evidence of deep venous thrombosis in the right lower extremity.    Electronically signed by resident: Nolvia Knutson  Date:    03/10/2025  Time:    20:33    Electronically signed by: Oli Barnard MD  Date:    03/10/2025  Time:    20:41               Narrative:    EXAMINATION:  US LOWER EXTREMITY VEINS RIGHT    CLINICAL HISTORY:  Localized edema    TECHNIQUE:  Duplex and color flow Doppler evaluation and graded compression of the right lower extremity veins was performed.    COMPARISON:  Ultrasound lower extremity veins bilateral 12/19/2013    FINDINGS:  Right thigh veins: The common femoral, femoral, popliteal, upper greater saphenous, and deep femoral veins are patent and free of thrombus. The veins are normally compressible and have normal phasic flow and augmentation response.    Right calf veins: The visualized calf veins are patent.    Contralateral CFV: The contralateral (left) common femoral vein is patent and free of thrombus.    Miscellaneous: None                                        X-Ray Chest 1 View (Final result)  Result time 03/10/25 20:54:10      Final result by Dmitri Medley MD (03/10/25 20:54:10)                   Impression:      Probable CHF with interstitial pulmonary edema.  Interstitial pneumonia other underlying pathology not excluded.  Correlate clinically and with continued radiographic follow-up (including repeat chest x-rays within 3 months).    This report was flagged in Epic as abnormal.      Electronically signed by: Dmitri Medley  Date:    03/10/2025  Time:    20:54               Narrative:    EXAMINATION:  XR CHEST 1 VIEW    CLINICAL HISTORY:  shortness of breath;    TECHNIQUE:  Single  frontal view of the chest was performed.    COMPARISON:  Portable AP chest x-ray    FINDINGS:  Midline thoracic trachea.    The cardiopericardial silhouette remains enlarged.  Atherosclerotic calcification of the thoracic aortic arch.    Pulmonary vascular engorgement and bilateral interstitial pulmonary opacities, no consolidation, discrete pneumothorax, or blunting of either lateral costophrenic angle.    No acute radiographic abnormality in the visualized skeleton or upper abdomen, noting that anatomic detail in these areas is suboptimally visualized                                       Medications   0.9%  NaCl infusion (for blood administration) (has no administration in time range)   sodium chloride 0.9% flush 5 mL (has no administration in time range)   albuterol-ipratropium 2.5 mg-0.5 mg/3 mL nebulizer solution 3 mL (has no administration in time range)   melatonin tablet 6 mg (has no administration in time range)   ondansetron disintegrating tablet 4 mg (has no administration in time range)   prochlorperazine injection Soln 5 mg (has no administration in time range)   polyethylene glycol packet 17 g (has no administration in time range)   aluminum-magnesium hydroxide-simethicone 200-200-20 mg/5 mL suspension 30 mL (has no administration in time range)   acetaminophen tablet 650 mg (has no administration in time range)   naloxone 0.4 mg/mL injection 0.02 mg (has no administration in time range)   cefTRIAXone injection 1 g (1 g Intravenous Given 3/11/25 0224)   doxycycline tablet 100 mg (100 mg Oral Given 3/11/25 0856)   sodium bicarbonate tablet 650 mg (650 mg Oral Given 3/11/25 1445)   dextromethorphan-guaiFENesin  mg per 12 hr tablet 1 tablet (1 tablet Oral Given 3/11/25 0900)   atorvastatin tablet 80 mg (80 mg Oral Given 3/11/25 0855)   carvediloL tablet 25 mg (25 mg Oral Not Given 3/11/25 0745)   ezetimibe tablet 10 mg (10 mg Oral Given 3/11/25 0856)   isosorbide mononitrate 24 hr tablet 120 mg  (120 mg Oral Given 3/11/25 0855)   NIFEdipine 24 hr tablet 60 mg (60 mg Oral Not Given 3/11/25 0900)   ranolazine 12 hr tablet 500 mg (500 mg Oral Given 3/11/25 0856)   hydrALAZINE tablet 25 mg (0 mg Oral Hold 3/11/25 1400)   sevelamer carbonate tablet 1,600 mg (has no administration in time range)   iohexoL (OMNIPAQUE 350) injection 75 mL (75 mLs Intravenous Given 3/10/25 3936)     Medical Decision Making  Differential diagnosis including but not limited to ACS, pneumonia, pneumothorax, CHF exacerbation, significant electrolyte abnormality, symptomatic aortic valve stenosis, fluid overload 2/2 ESRD with the missed dialysis.    Map of 65 upon arrival to the ED.  Patient transitioned from CPAP to 3 L nasal cannula upon arrival.  Physical exam remarkable for bibasilar crackles and BLE R < L and workup remarkable for elevated D-dimer.  Discussed case with Nephrology who is agreeable for HD today s/p CTA PE whic was negative for PE. RLE US negative for DVT.   CBC remarkable for hemoglobin of 6.2 patient was consented in 1 unit RBC was initiated in the ED prior to hospital medicine admission for SOB needing HD.    Amount and/or Complexity of Data Reviewed  External Data Reviewed: notes.     Details: 2075 TTE with EF 50 55% noted to have moderate aortic valve stenosis and mitral valve regurgitation.  Labs: ordered. Decision-making details documented in ED Course.  Radiology: ordered.  Discussion of management or test interpretation with external provider(s): Discussed case with Nephrology who is agreeable for blood transfusion of 1u pRBC prior to hemodialysis    Risk  Prescription drug management.  Decision regarding hospitalization.               ED Course as of 03/11/25 1449   Mon Mar 10, 2025   1829 D-Dimer(!): 2.25  Pending CTA PE study [AH]   1830 BNP(!): 3,282 []   1830 Creatinine(!): 10.3 []   1903 Patient is a 49-year-old male with past medical history CAD, hypertension, hyperparathyroidism, diabetes, renal  cell carcinoma, ESRD on HD MWF that is presenting for respiratory distress, shortness of breath.  As per patient, patient states that he became very short of breath today.  EMS reports that patient missed dialysis last Wednesday however went to dialysis on Friday.  Patient was due for dialysis today however became acutely short of breath.  The patient was found to be hypertensive systolic blood pressure 180s, given 2 doses of nitro.  Patient was also found to be in respiratory distress, placed on CPAP, had pulmonary B lines.  Patient states that he has lower extremity edema, feels that he has too much fluid.  Patient denies any fevers, chills, chest pains, nausea, vomiting, diarrhea, abdominal pain.    Physical exam:  49-year-old male, breathing more comfortably on BiPAP, able to speak in full sentences.  Patient appears more stable at this time, bilateral crackles throughout both lung fields.  Patient was initially in respiratory distress, shortened sentences however now able to speak in full sentences.  Benign cardiac, abdominal exam.  Patient has edema bilateral lower extremities.    Plan: Consider fluid overload, also consider possible PE.  D-dimer positive.  Also discussed with nephrology for emergent dialysis.  They agree.  Patient agrees with treatment and plan [RT]   1903 Attestation of Dr. Fletcher (Attending Physician):      I have reviewed the record and the patient care provided by the Resident provider and agree with the documented HPI, ROS, PE.  I also agree with the treatment plan and work up and I have performed an independent history / physical exam and MDM.   [RT]   1903 Hemoglobin(!): 6.2  Acute anemia noted.  Worsened then previous.  We will transfuse 1 unit [RT]      ED Course User Index  [AH] Keely Luis MD  [RT] Raoul Fletcher MD                           Clinical Impression:  Final diagnoses:  [R06.02] Shortness of breath  [R60.0] Lower extremity edema  [N18.6, D63.1, Z99.2] Anemia due to  chronic kidney disease, on chronic dialysis (Primary)          ED Disposition Condition    Admit                   Keely Luis MD  Resident  03/10/25 [id number]       Keely Luis MD  Resident  03/11/25 0015         [1]   Social History  Tobacco Use    Smoking status: Never    Smokeless tobacco: Never   Substance Use Topics    Alcohol use: Not Currently     Comment: One drink a month    Drug use: No        Raoul Fletcher MD  03/11/25 [id number]

## 2025-03-11 ENCOUNTER — TELEPHONE (OUTPATIENT)
Dept: CARDIOLOGY | Facility: CLINIC | Age: 50
End: 2025-03-11
Payer: MEDICARE

## 2025-03-11 ENCOUNTER — TELEPHONE (OUTPATIENT)
Dept: FAMILY MEDICINE | Facility: CLINIC | Age: 50
End: 2025-03-11
Payer: MEDICARE

## 2025-03-11 PROBLEM — I50.33 ACUTE ON CHRONIC HEART FAILURE WITH PRESERVED EJECTION FRACTION (HFPEF): Status: ACTIVE | Noted: 2025-03-11

## 2025-03-11 LAB
ABO + RH BLD: NORMAL
ADENOVIRUS: NOT DETECTED
ALBUMIN SERPL BCP-MCNC: 2.4 G/DL (ref 3.5–5.2)
ALP SERPL-CCNC: 87 U/L (ref 40–150)
ALT SERPL W/O P-5'-P-CCNC: 15 U/L (ref 10–44)
ANION GAP SERPL CALC-SCNC: 14 MMOL/L (ref 8–16)
AST SERPL-CCNC: 18 U/L (ref 10–40)
BASOPHILS # BLD AUTO: 0.07 K/UL (ref 0–0.2)
BASOPHILS # BLD AUTO: 0.09 K/UL (ref 0–0.2)
BASOPHILS NFR BLD: 0.6 % (ref 0–1.9)
BASOPHILS NFR BLD: 0.7 % (ref 0–1.9)
BILIRUB SERPL-MCNC: 0.8 MG/DL (ref 0.1–1)
BLD GP AB SCN CELLS X3 SERPL QL: NORMAL
BLD PROD TYP BPU: NORMAL
BLOOD UNIT EXPIRATION DATE: NORMAL
BLOOD UNIT TYPE CODE: 5100
BLOOD UNIT TYPE: NORMAL
BORDETELLA PARAPERTUSSIS (IS1001): NOT DETECTED
BORDETELLA PERTUSSIS (PTXP): NOT DETECTED
BUN SERPL-MCNC: 44 MG/DL (ref 6–30)
BUN SERPL-MCNC: 51 MG/DL (ref 6–20)
CALCIUM SERPL-MCNC: 9.2 MG/DL (ref 8.7–10.5)
CHLAMYDIA PNEUMONIAE: NOT DETECTED
CHLORIDE SERPL-SCNC: 96 MMOL/L (ref 95–110)
CHLORIDE SERPL-SCNC: 99 MMOL/L (ref 95–110)
CO2 SERPL-SCNC: 25 MMOL/L (ref 23–29)
CODING SYSTEM: NORMAL
CORONAVIRUS 229E, COMMON COLD VIRUS: NOT DETECTED
CORONAVIRUS HKU1, COMMON COLD VIRUS: NOT DETECTED
CORONAVIRUS NL63, COMMON COLD VIRUS: NOT DETECTED
CORONAVIRUS OC43, COMMON COLD VIRUS: NOT DETECTED
CREAT SERPL-MCNC: 7.4 MG/DL (ref 0.5–1.4)
CREAT SERPL-MCNC: 9.4 MG/DL (ref 0.5–1.4)
CROSSMATCH INTERPRETATION: NORMAL
DIFFERENTIAL METHOD BLD: ABNORMAL
DIFFERENTIAL METHOD BLD: ABNORMAL
DISPENSE STATUS: NORMAL
EOSINOPHIL # BLD AUTO: 0.5 K/UL (ref 0–0.5)
EOSINOPHIL # BLD AUTO: 0.5 K/UL (ref 0–0.5)
EOSINOPHIL NFR BLD: 4 % (ref 0–8)
EOSINOPHIL NFR BLD: 4.1 % (ref 0–8)
ERYTHROCYTE [DISTWIDTH] IN BLOOD BY AUTOMATED COUNT: 16.2 % (ref 11.5–14.5)
ERYTHROCYTE [DISTWIDTH] IN BLOOD BY AUTOMATED COUNT: 16.3 % (ref 11.5–14.5)
EST. GFR  (NO RACE VARIABLE): 8.5 ML/MIN/1.73 M^2
FLUBV RNA NPH QL NAA+NON-PROBE: NOT DETECTED
GLUCOSE SERPL-MCNC: 151 MG/DL (ref 70–110)
GLUCOSE SERPL-MCNC: 172 MG/DL (ref 70–110)
GLUCOSE SERPL-MCNC: 203 MG/DL (ref 70–110)
HCT VFR BLD AUTO: 23.8 % (ref 40–54)
HCT VFR BLD AUTO: 23.8 % (ref 40–54)
HCT VFR BLD AUTO: 25.8 % (ref 40–54)
HCT VFR BLD CALC: 24 %PCV (ref 36–54)
HGB BLD-MCNC: 7.7 G/DL (ref 14–18)
HGB BLD-MCNC: 7.7 G/DL (ref 14–18)
HGB BLD-MCNC: 8.4 G/DL (ref 14–18)
HPIV1 RNA NPH QL NAA+NON-PROBE: NOT DETECTED
HPIV2 RNA NPH QL NAA+NON-PROBE: NOT DETECTED
HPIV3 RNA NPH QL NAA+NON-PROBE: NOT DETECTED
HPIV4 RNA NPH QL NAA+NON-PROBE: NOT DETECTED
HUMAN METAPNEUMOVIRUS: NOT DETECTED
IMM GRANULOCYTES # BLD AUTO: 0.07 K/UL (ref 0–0.04)
IMM GRANULOCYTES # BLD AUTO: 0.09 K/UL (ref 0–0.04)
IMM GRANULOCYTES NFR BLD AUTO: 0.6 % (ref 0–0.5)
IMM GRANULOCYTES NFR BLD AUTO: 0.7 % (ref 0–0.5)
INFLUENZA A: NOT DETECTED
LYMPHOCYTES # BLD AUTO: 0.8 K/UL (ref 1–4.8)
LYMPHOCYTES # BLD AUTO: 0.9 K/UL (ref 1–4.8)
LYMPHOCYTES NFR BLD: 7.2 % (ref 18–48)
LYMPHOCYTES NFR BLD: 7.4 % (ref 18–48)
MAGNESIUM SERPL-MCNC: 1.7 MG/DL (ref 1.6–2.6)
MCH RBC QN AUTO: 28.8 PG (ref 27–31)
MCH RBC QN AUTO: 29.3 PG (ref 27–31)
MCHC RBC AUTO-ENTMCNC: 32.4 G/DL (ref 32–36)
MCHC RBC AUTO-ENTMCNC: 32.6 G/DL (ref 32–36)
MCV RBC AUTO: 89 FL (ref 82–98)
MCV RBC AUTO: 90 FL (ref 82–98)
MONOCYTES # BLD AUTO: 0.7 K/UL (ref 0.3–1)
MONOCYTES # BLD AUTO: 0.7 K/UL (ref 0.3–1)
MONOCYTES NFR BLD: 5.9 % (ref 4–15)
MONOCYTES NFR BLD: 5.9 % (ref 4–15)
MRSA SCREEN BY PCR: NOT DETECTED
MYCOPLASMA PNEUMONIAE: NOT DETECTED
NEUTROPHILS # BLD AUTO: 9.1 K/UL (ref 1.8–7.7)
NEUTROPHILS # BLD AUTO: 9.8 K/UL (ref 1.8–7.7)
NEUTROPHILS NFR BLD: 81.3 % (ref 38–73)
NEUTROPHILS NFR BLD: 81.6 % (ref 38–73)
NRBC BLD-RTO: 0 /100 WBC
NRBC BLD-RTO: 0 /100 WBC
NUM UNITS TRANS PACKED RBC: NORMAL
OHS QRS DURATION: 138 MS
OHS QTC CALCULATION: 494 MS
PHOSPHATE SERPL-MCNC: 3.8 MG/DL (ref 2.7–4.5)
PLATELET # BLD AUTO: 355 K/UL (ref 150–450)
PLATELET # BLD AUTO: 356 K/UL (ref 150–450)
PMV BLD AUTO: 9.4 FL (ref 9.2–12.9)
PMV BLD AUTO: 9.4 FL (ref 9.2–12.9)
POC IONIZED CALCIUM: 1.16 MMOL/L (ref 1.06–1.42)
POC TCO2 (MEASURED): 30 MMOL/L (ref 23–27)
POTASSIUM BLD-SCNC: 4 MMOL/L (ref 3.5–5.1)
POTASSIUM SERPL-SCNC: 3.6 MMOL/L (ref 3.5–5.1)
PROT SERPL-MCNC: 7.2 G/DL (ref 6–8.4)
RBC # BLD AUTO: 2.67 M/UL (ref 4.6–6.2)
RBC # BLD AUTO: 2.87 M/UL (ref 4.6–6.2)
RESPIRATORY INFECTION PANEL SOURCE: NORMAL
RSV RNA NPH QL NAA+NON-PROBE: NOT DETECTED
RV+EV RNA NPH QL NAA+NON-PROBE: NOT DETECTED
SAMPLE: ABNORMAL
SARS-COV-2 RNA RESP QL NAA+PROBE: NOT DETECTED
SODIUM BLD-SCNC: 137 MMOL/L (ref 136–145)
SODIUM SERPL-SCNC: 138 MMOL/L (ref 136–145)
SPECIMEN OUTDATE: NORMAL
WBC # BLD AUTO: 11.11 K/UL (ref 3.9–12.7)
WBC # BLD AUTO: 12.1 K/UL (ref 3.9–12.7)

## 2025-03-11 PROCEDURE — 96374 THER/PROPH/DIAG INJ IV PUSH: CPT | Mod: HCNC

## 2025-03-11 PROCEDURE — 86920 COMPATIBILITY TEST SPIN: CPT | Mod: HCNC

## 2025-03-11 PROCEDURE — 12000002 HC ACUTE/MED SURGE SEMI-PRIVATE ROOM: Mod: HCNC

## 2025-03-11 PROCEDURE — 83735 ASSAY OF MAGNESIUM: CPT | Mod: HCNC | Performed by: FAMILY MEDICINE

## 2025-03-11 PROCEDURE — P9016 RBC LEUKOCYTES REDUCED: HCPCS | Mod: HCNC

## 2025-03-11 PROCEDURE — 0202U NFCT DS 22 TRGT SARS-COV-2: CPT | Mod: HCNC | Performed by: FAMILY MEDICINE

## 2025-03-11 PROCEDURE — 25000003 PHARM REV CODE 250: Mod: HCNC

## 2025-03-11 PROCEDURE — 85025 COMPLETE CBC W/AUTO DIFF WBC: CPT | Mod: 91,HCNC | Performed by: STUDENT IN AN ORGANIZED HEALTH CARE EDUCATION/TRAINING PROGRAM

## 2025-03-11 PROCEDURE — 25000003 PHARM REV CODE 250: Mod: HCNC | Performed by: FAMILY MEDICINE

## 2025-03-11 PROCEDURE — 99222 1ST HOSP IP/OBS MODERATE 55: CPT | Mod: HCNC,,,

## 2025-03-11 PROCEDURE — 80047 BASIC METABLC PNL IONIZED CA: CPT | Mod: HCNC

## 2025-03-11 PROCEDURE — 36430 TRANSFUSION BLD/BLD COMPNT: CPT | Mod: HCNC

## 2025-03-11 PROCEDURE — 63600175 PHARM REV CODE 636 W HCPCS: Mod: HCNC | Performed by: FAMILY MEDICINE

## 2025-03-11 PROCEDURE — 84100 ASSAY OF PHOSPHORUS: CPT | Mod: HCNC | Performed by: FAMILY MEDICINE

## 2025-03-11 PROCEDURE — G0257 UNSCHED DIALYSIS ESRD PT HOS: HCPCS | Mod: HCNC

## 2025-03-11 PROCEDURE — 80053 COMPREHEN METABOLIC PANEL: CPT | Mod: HCNC | Performed by: FAMILY MEDICINE

## 2025-03-11 PROCEDURE — 85025 COMPLETE CBC W/AUTO DIFF WBC: CPT | Mod: HCNC | Performed by: FAMILY MEDICINE

## 2025-03-11 PROCEDURE — 87641 MR-STAPH DNA AMP PROBE: CPT | Mod: HCNC | Performed by: STUDENT IN AN ORGANIZED HEALTH CARE EDUCATION/TRAINING PROGRAM

## 2025-03-11 RX ORDER — ISOSORBIDE MONONITRATE 60 MG/1
120 TABLET, EXTENDED RELEASE ORAL DAILY
Status: DISCONTINUED | OUTPATIENT
Start: 2025-03-11 | End: 2025-03-12 | Stop reason: HOSPADM

## 2025-03-11 RX ORDER — SODIUM BICARBONATE 650 MG/1
650 TABLET ORAL 3 TIMES DAILY
Status: DISCONTINUED | OUTPATIENT
Start: 2025-03-11 | End: 2025-03-12 | Stop reason: HOSPADM

## 2025-03-11 RX ORDER — SODIUM CHLORIDE 9 MG/ML
INJECTION, SOLUTION INTRAVENOUS ONCE
Status: DISCONTINUED | OUTPATIENT
Start: 2025-03-11 | End: 2025-03-12 | Stop reason: HOSPADM

## 2025-03-11 RX ORDER — EZETIMIBE 10 MG/1
10 TABLET ORAL DAILY
Status: DISCONTINUED | OUTPATIENT
Start: 2025-03-11 | End: 2025-03-12 | Stop reason: HOSPADM

## 2025-03-11 RX ORDER — TALC
6 POWDER (GRAM) TOPICAL NIGHTLY PRN
Status: DISCONTINUED | OUTPATIENT
Start: 2025-03-11 | End: 2025-03-12 | Stop reason: HOSPADM

## 2025-03-11 RX ORDER — SEVELAMER CARBONATE 800 MG/1
1600 TABLET, FILM COATED ORAL
Status: DISCONTINUED | OUTPATIENT
Start: 2025-03-11 | End: 2025-03-12 | Stop reason: HOSPADM

## 2025-03-11 RX ORDER — CEFTRIAXONE 1 G/1
1 INJECTION, POWDER, FOR SOLUTION INTRAMUSCULAR; INTRAVENOUS
Status: DISCONTINUED | OUTPATIENT
Start: 2025-03-11 | End: 2025-03-12 | Stop reason: HOSPADM

## 2025-03-11 RX ORDER — NALOXONE HCL 0.4 MG/ML
0.02 VIAL (ML) INJECTION
Status: DISCONTINUED | OUTPATIENT
Start: 2025-03-11 | End: 2025-03-12 | Stop reason: HOSPADM

## 2025-03-11 RX ORDER — HYDRALAZINE HYDROCHLORIDE 25 MG/1
25 TABLET, FILM COATED ORAL EVERY 8 HOURS
Status: DISCONTINUED | OUTPATIENT
Start: 2025-03-11 | End: 2025-03-12 | Stop reason: HOSPADM

## 2025-03-11 RX ORDER — SODIUM CHLORIDE 9 MG/ML
INJECTION, SOLUTION INTRAVENOUS ONCE
Status: DISCONTINUED | OUTPATIENT
Start: 2025-03-12 | End: 2025-03-12 | Stop reason: HOSPADM

## 2025-03-11 RX ORDER — DOXYCYCLINE HYCLATE 100 MG
100 TABLET ORAL EVERY 12 HOURS
Status: DISCONTINUED | OUTPATIENT
Start: 2025-03-11 | End: 2025-03-12 | Stop reason: HOSPADM

## 2025-03-11 RX ORDER — HEPARIN SODIUM 1000 [USP'U]/ML
1000 INJECTION, SOLUTION INTRAVENOUS; SUBCUTANEOUS
Status: DISCONTINUED | OUTPATIENT
Start: 2025-03-11 | End: 2025-03-12 | Stop reason: HOSPADM

## 2025-03-11 RX ORDER — SODIUM CHLORIDE 0.9 % (FLUSH) 0.9 %
5 SYRINGE (ML) INJECTION
Status: DISCONTINUED | OUTPATIENT
Start: 2025-03-11 | End: 2025-03-12 | Stop reason: HOSPADM

## 2025-03-11 RX ORDER — ATORVASTATIN CALCIUM 40 MG/1
80 TABLET, FILM COATED ORAL DAILY
Status: DISCONTINUED | OUTPATIENT
Start: 2025-03-11 | End: 2025-03-12 | Stop reason: HOSPADM

## 2025-03-11 RX ORDER — RANOLAZINE 500 MG/1
500 TABLET, EXTENDED RELEASE ORAL DAILY
Status: DISCONTINUED | OUTPATIENT
Start: 2025-03-11 | End: 2025-03-12 | Stop reason: HOSPADM

## 2025-03-11 RX ORDER — ALUMINUM HYDROXIDE, MAGNESIUM HYDROXIDE, AND SIMETHICONE 1200; 120; 1200 MG/30ML; MG/30ML; MG/30ML
30 SUSPENSION ORAL 4 TIMES DAILY PRN
Status: DISCONTINUED | OUTPATIENT
Start: 2025-03-11 | End: 2025-03-12 | Stop reason: HOSPADM

## 2025-03-11 RX ORDER — NIFEDIPINE 30 MG/1
60 TABLET, EXTENDED RELEASE ORAL 2 TIMES DAILY
Status: DISCONTINUED | OUTPATIENT
Start: 2025-03-11 | End: 2025-03-12 | Stop reason: HOSPADM

## 2025-03-11 RX ORDER — CARVEDILOL 12.5 MG/1
25 TABLET ORAL 2 TIMES DAILY WITH MEALS
Status: DISCONTINUED | OUTPATIENT
Start: 2025-03-11 | End: 2025-03-12 | Stop reason: HOSPADM

## 2025-03-11 RX ORDER — IPRATROPIUM BROMIDE AND ALBUTEROL SULFATE 2.5; .5 MG/3ML; MG/3ML
3 SOLUTION RESPIRATORY (INHALATION) EVERY 4 HOURS PRN
Status: DISCONTINUED | OUTPATIENT
Start: 2025-03-11 | End: 2025-03-12 | Stop reason: HOSPADM

## 2025-03-11 RX ORDER — ACETAMINOPHEN 325 MG/1
650 TABLET ORAL EVERY 4 HOURS PRN
Status: DISCONTINUED | OUTPATIENT
Start: 2025-03-11 | End: 2025-03-12 | Stop reason: HOSPADM

## 2025-03-11 RX ORDER — POLYETHYLENE GLYCOL 3350 17 G/17G
17 POWDER, FOR SOLUTION ORAL DAILY PRN
Status: DISCONTINUED | OUTPATIENT
Start: 2025-03-11 | End: 2025-03-12 | Stop reason: HOSPADM

## 2025-03-11 RX ORDER — ONDANSETRON 4 MG/1
4 TABLET, ORALLY DISINTEGRATING ORAL EVERY 6 HOURS PRN
Status: DISCONTINUED | OUTPATIENT
Start: 2025-03-11 | End: 2025-03-12 | Stop reason: HOSPADM

## 2025-03-11 RX ORDER — ACETAMINOPHEN 500 MG
1000 TABLET ORAL
COMMUNITY
End: 2025-03-17

## 2025-03-11 RX ORDER — SEVELAMER CARBONATE 800 MG/1
800 TABLET, FILM COATED ORAL
Status: DISCONTINUED | OUTPATIENT
Start: 2025-03-11 | End: 2025-03-11

## 2025-03-11 RX ORDER — PROCHLORPERAZINE EDISYLATE 5 MG/ML
5 INJECTION INTRAMUSCULAR; INTRAVENOUS EVERY 6 HOURS PRN
Status: DISCONTINUED | OUTPATIENT
Start: 2025-03-11 | End: 2025-03-12 | Stop reason: HOSPADM

## 2025-03-11 RX ADMIN — SODIUM BICARBONATE 650 MG TABLET 650 MG: at 08:03

## 2025-03-11 RX ADMIN — GUAIFENESIN, DEXTROMETHORPHAN HBR 1 TABLET: 600; 30 TABLET ORAL at 09:03

## 2025-03-11 RX ADMIN — GUAIFENESIN, DEXTROMETHORPHAN HBR 1 TABLET: 600; 30 TABLET ORAL at 10:03

## 2025-03-11 RX ADMIN — RANOLAZINE 500 MG: 500 TABLET, EXTENDED RELEASE ORAL at 08:03

## 2025-03-11 RX ADMIN — SODIUM BICARBONATE 650 MG TABLET 650 MG: at 02:03

## 2025-03-11 RX ADMIN — SODIUM BICARBONATE 650 MG TABLET 650 MG: at 10:03

## 2025-03-11 RX ADMIN — DOXYCYCLINE HYCLATE 100 MG: 100 TABLET, COATED ORAL at 02:03

## 2025-03-11 RX ADMIN — GUAIFENESIN, DEXTROMETHORPHAN HBR 1 TABLET: 600; 30 TABLET ORAL at 02:03

## 2025-03-11 RX ADMIN — ATORVASTATIN CALCIUM 80 MG: 40 TABLET, FILM COATED ORAL at 08:03

## 2025-03-11 RX ADMIN — EZETIMIBE 10 MG: 10 TABLET ORAL at 08:03

## 2025-03-11 RX ADMIN — DOXYCYCLINE HYCLATE 100 MG: 100 TABLET, COATED ORAL at 10:03

## 2025-03-11 RX ADMIN — DOXYCYCLINE HYCLATE 100 MG: 100 TABLET, COATED ORAL at 08:03

## 2025-03-11 RX ADMIN — SEVELAMER CARBONATE 1600 MG: 800 TABLET, FILM COATED ORAL at 06:03

## 2025-03-11 RX ADMIN — CEFTRIAXONE 1 G: 1 INJECTION, POWDER, FOR SOLUTION INTRAMUSCULAR; INTRAVENOUS at 02:03

## 2025-03-11 RX ADMIN — ISOSORBIDE MONONITRATE 120 MG: 60 TABLET, EXTENDED RELEASE ORAL at 08:03

## 2025-03-11 NOTE — ED NOTES
Pt wheeled back to bed. Provided w/ new sheets. Repositioned in bed. MD Kassy at bedside discussing plan of care.

## 2025-03-11 NOTE — TELEPHONE ENCOUNTER
----- Message from Yary sent at 3/11/2025  9:26 AM CDT -----  Contact: Gabriella [phone]  [email or web address] Patient is calling for Medical Advice regarding:Tennathanieljayleen with [employer] [phone] calling about pt. Meds ranolazine (RANEXA) 500 MG Tb12  Please call her back and advise.How long has patient had these symptoms:Pharmacy name and phone#:Patient wants a call back or thru [facility]:callbackComments:Please advise patient replies from provider may take up to 48 hours.   Notes were reviewed and approved. Called and spoke to pt and scheduled appt.

## 2025-03-11 NOTE — SUBJECTIVE & OBJECTIVE
Past Medical History:   Diagnosis Date    CAD (coronary artery disease), native coronary artery 11/21/2019    Cataract     Diabetes mellitus     Diabetic retinopathy     Dialysis patient     DM type 2 causing renal disease, not at goal     ESRD (end stage renal disease) started dialysis 01/2014 06/05/2014    H/o acute coronary syndrome with high troponin 03/11/2024    History of colon polyps 02/10/2021    History of COVID-19 12/29/2022    Hyperparathyroidism, secondary renal 06/05/2014    Hypertension     Hypertensive emergency 11/27/2023    NSTEMI (non-ST elevated myocardial infarction) 12/21/2013    Organ transplant candidate 06/05/2014    Palliative care encounter 10/29/2024    Pleural effusion 06/07/2024    Pneumonia     Post PTCA 08/26/2020    RCA HILLARY 7-25-20    Renal cell carcinoma of right kidney     Renal hypertension     Stroke        Past Surgical History:   Procedure Laterality Date    ANGIOGRAM, CORONARY, WITH LEFT HEART CATHETERIZATION N/A 7/1/2021    Procedure: Angiogram, Coronary, with Left Heart Cath;  Surgeon: Miki Zendejas MD;  Location: [facility] CATH LAB;  Service: Cardiology;  Laterality: N/A;    ANGIOGRAM, CORONARY, WITH LEFT HEART CATHETERIZATION N/A 11/28/2023    Procedure: Angiogram, Coronary, with Left Heart Cath;  Surgeon: Noah Pendleton MD;  Location: [facility] CATH LAB;  Service: Cardiology;  Laterality: N/A;    COLONOSCOPY N/A 5/3/2017    Procedure: COLONOSCOPY;  Surgeon: Rufino Carpenter MD;  Location: [address]);  Service: Endoscopy;  Laterality: N/A;  pt states can only schedule on Wednesdays    COLONOSCOPY N/A 2/9/2021    Procedure: COLONOSCOPY;  Surgeon: Edil Wong MD;  Location: [address]);  Service: Endoscopy;  Laterality: N/A;  Dialysis MWF/ labwork day of procedure  right arm aceess  per Dr. Colin pt can hold Plavix 5 days prior see note- sm  COVID test on 2/6/21 at [facility] - sm    COLONOSCOPY N/A 2/10/2021    Procedure: COLONOSCOPY;  Surgeon: Robert Ayers,  MD;  Location: [facility] ENDO (4TH FLR);  Service: Endoscopy;  Laterality: N/A;  rescheduled due to poor bowel prep-BB  negative covid screening 2/6/21-BB  dialysis M-W-F-BB  okay to r/s for 2/9/21 and to hold Plavix per Dr. KIRAN Wong-BB  labs same day-BB    CORONARY ANGIOGRAPHY N/A 2/10/2025    Procedure: ANGIOGRAM, CORONARY ARTERY;  Surgeon: Ajith Hutchins MD;  Location: [facility] CATH LAB;  Service: Cardiology;  Laterality: N/A;    CORONARY STENT PLACEMENT N/A 7/25/2019    Procedure: INSERTION, STENT, CORONARY ARTERY;  Surgeon: Miki Zendejas MD;  Location: [facility] CATH LAB;  Service: Cardiology;  Laterality: N/A;    DIALYSIS FISTULA CREATION      FISTULOGRAM N/A 2/18/2019    Procedure: Fistulogram;  Surgeon: Yaneth De La Cruz MD;  Location: [facility] CATH LAB;  Service: Cardiology;  Laterality: N/A;    FISTULOGRAM Right 7/23/2019    Procedure: Fistulogram;  Surgeon: Oz Cordoba MD;  Location: [facility] CATH LAB;  Service: Cardiology;  Laterality: Right;    LAPAROSCOPIC ROBOT-ASSISTED SURGICAL REMOVAL OF KIDNEY USING DA JAIME XI Right 5/19/2022    Procedure: XI ROBOTIC NEPHRECTOMY;  Surgeon: Aidan Hendricks MD;  [address];  Service: Urology;  Laterality: Right;  3hrs    LAPAROSCOPIC ROBOT-ASSISTED SURGICAL REMOVAL OF KIDNEY USING DA JAIME XI Left 1/5/2023    Procedure: XI ROBOTIC NEPHRECTOMY;  Surgeon: Aidan Hendricks MD;  [address];  Service: Urology;  Laterality: Left;  4 hrs    LEFT HEART CATHETERIZATION Left 7/25/2019    Procedure: Left heart cath;  Surgeon: Miki Zendejas MD;  Location: [facility] CATH LAB;  Service: Cardiology;  Laterality: Left;    LEFT HEART CATHETERIZATION Left 2/10/2025    Procedure: Left heart cath;  Surgeon: Ajith Hutchins MD;  Location: [facility] CATH LAB;  Service: Cardiology;  Laterality: Left;    REPAIR  1/5/2023    Procedure: REPAIR; COLOTOMY;  Surgeon: Maikel Lamb MD;  Location: [facility] 2ND FLR;  Service: General;;    RETINAL LASER PROCEDURE  Bilateral 2018 or 2017    Dr. Rothman    VASCULAR SURGERY         Review of patient's allergies indicates:   Allergen Reactions    No known allergies        Current Facility-Administered Medications on File Prior to Encounter   Medication    lidocaine (PF) 10 mg/ml (1%) injection 10 mg     Current Outpatient Medications on File Prior to Encounter   Medication Sig    aspirin (ECOTRIN) 81 MG EC tablet Take 1 tablet (81 mg total) by mouth once daily.    atorvastatin (LIPITOR) 80 MG tablet Take 1 tablet (80 mg total) by mouth once daily.    carvediloL (COREG) 25 MG tablet Take 1 tablet (25 mg total) by mouth 2 (two) times daily with meals.    clopidogreL (PLAVIX) 75 mg tablet Take 1 tablet (75 mg total) by mouth once daily.    epoetin philip (PROCRIT) 4,000 unit/mL injection Inject 1.93 mLs (7,720 Units total) into the skin every Mon, Wed, Fri.    ezetimibe (ZETIA) 10 mg tablet Take 1 tablet (10 mg total) by mouth once daily.    hydrALAZINE (APRESOLINE) 25 MG tablet Take 1 tablet (25 mg total) by mouth every 8 (eight) hours.    isosorbide mononitrate (IMDUR) 120 MG 24 hr tablet Take 1 tablet (120 mg total) by mouth once daily.    methocarbamoL (ROBAXIN) 500 MG Tab Take 1 tablet (500 mg total) by mouth 4 (four) times daily as needed (muscle spasms).    methoxy peg-epoetin beta (MIRCERA INJ) 150 mcg.    NIFEdipine (PROCARDIA-XL) 60 MG (OSM) 24 hr tablet Take 1 tablet (60 mg total) by mouth 2 (two) times a day.    nitroGLYCERIN (NITROSTAT) 0.4 MG SL tablet Place 1 tablet (0.4 mg total) under the tongue every 5 (five) minutes as needed for Chest pain.    ranolazine (RANEXA) 500 MG Tb12 Take 1 tablet (500 mg total) by mouth Daily.    sevelamer carbonate (RENVELA) 800 mg Tab Take 2 tablets (1,600 mg total) by mouth 3 (three) times daily with meals.    tenapanor (XPHOZAH) 20 mg Tab Take 1 tablet by mouth.    [DISCONTINUED] minoxidiL (LONITEN) 10 MG Tab Take 1 tablet (10 mg total) by mouth 2 (two) times daily.     Family History        Problem Relation (Age of Onset)    Cancer Maternal Grandfather    Cataracts Mother    Diabetes Mother    Heart disease Brother    Hypertension Mother, Father, Sister, Brother    Kidney disease Brother          Tobacco Use    Smoking status: Never    Smokeless tobacco: Never   Substance and Sexual Activity    Alcohol use: Not Currently     Comment: One drink a month    Drug use: No    Sexual activity: Yes     Partners: Female     Birth control/protection: None     Review of Systems: See HPI  Objective:     Vital Signs (Most Recent):  Temp: 98.1 °F (36.7 °C) (03/11/25 0350)  Pulse: 101 (03/11/25 0645)  Resp: 18 (03/11/25 0645)  BP: 126/64 (03/11/25 0645)  SpO2: 97 % (03/11/25 0350) Vital Signs (24h Range):  Temp:  [97.7 °F (36.5 °C)-98.5 °F (36.9 °C)] 98.1 °F (36.7 °C)  Pulse:  [] 101  Resp:  [16-25] 18  SpO2:  [92 %-98 %] 97 %  BP: ()/(52-79) 126/64     Weight: 71.1 kg (156 lb 12 oz)  Body mass index is 24.55 kg/m².     Physical Exam  Vitals reviewed.   Constitutional:       General: He is not in acute distress.     Appearance: He is not toxic-appearing.   HENT:      Mouth/Throat:      Mouth: Mucous membranes are moist.   Eyes:      General: No scleral icterus.  Cardiovascular:      Rate and Rhythm: Normal rate and regular rhythm.      Heart sounds: Normal heart sounds.   Pulmonary:      Effort: Pulmonary effort is normal. No respiratory distress.      Breath sounds: Rhonchi and rales (Bilateral) present.      Comments: Diminished breath sounds bilaterally.  On 4-5 L  Abdominal:      General: Bowel sounds are normal. There is no distension.      Palpations: Abdomen is soft.      Tenderness: There is no abdominal tenderness.   Musculoskeletal:      Right lower leg: Edema (2+) present.      Left lower leg: Edema (2+) present.   Skin:     General: Skin is warm and dry.   Neurological:      General: No focal deficit present.      Mental Status: He is alert and oriented to person, place, and time.    Psychiatric:         Mood and Affect: Mood normal.         Behavior: Behavior normal.     Significant Labs personally reviewed:  WBC 11.79, Hgb 6.2, D-dimer 2.25, CO2 14, BUN 85, Cr 10.3, BNP 2282, troponin 2502 (7124 on 03/03).    EKG personally reviewed: Intraventricular block, nonspecific T-wave changes, similar to previous

## 2025-03-11 NOTE — PLAN OF CARE
MD at bedside, SW unable to complete assessment.     MIRTA Iyer, CSW.   Case Management  [facility]  Email: [email or web address]

## 2025-03-11 NOTE — ASSESSMENT & PLAN NOTE
"Per [facility] report 02/2025:  "Extensive multivessel coronary artery disease which is not amenable to revascularization (either PCI or bypass surgery - transmural scar in distal LAD/left circumflex/PLB noted on PET scan done in October, 2024) would recommend maximal medical therapy"  Continue home medication regimen as BP will allow  "

## 2025-03-11 NOTE — ASSESSMENT & PLAN NOTE
Afebrile with no leukocytosis but unable to rule out PNA based on hypoxia, bilateral rales, CTA chest results and cough.  Procalcitonin unreliable in ESRD  Sputum culture pending  MRSA screen and RIP pending  Symptomatic management  Continue ceftriaxone and doxycycline ()

## 2025-03-11 NOTE — CONSULTS
[facility] - Emergency Dept  Nephrology  Consult Note    Patient Name: Haroldo Dickinson  MRN: [id number]  Admission Date: 3/10/2025  Hospital Length of Stay: 0 days  Attending Provider: Montse Elena MD   Primary Care Physician: Mireya Larose MD  Principal Problem:Acute hypoxemic respiratory failure    Inpatient consult to Nephrology  Consult performed by: Katherine Potts PA-C  Consult ordered by: Keely Luis MD  Reason for consult: ESRD        Subjective:     HPI: 48 yo M with extensive past medical history as below, notably CVA, MVCAD not amenable to revascularization by PCI or CABG, HFpEF, anuric ESRD HD MWF, RCC s/p nephrectomy who presents to the ED with chief complaint of SOB.  Pt says symptoms began 3 days ago.  He describes associated dry cough, congestion, dyspnea on minimal exertion, palpitations, lower extremity edema with pain, stable orthopnea, and stable chest pain.  Last HD 3/7, pt missed 3/10 d/t acute illness.  He denies headache, change to vision, fevers, chills, abdominal pain, nausea, vomiting, diarrhea, constipation, blood in stool, or numbness or tingling in extremities.     In the ED, D-dimer was elevated and CTA chest ordered with no evidence of PE but revealed bilateral infiltrates concerning for infection or edema.  Hgb 6.2, 1 unit PRBCs transfused.  Nephrology consulted for ESRD.     Past Medical History:   Diagnosis Date    CAD (coronary artery disease), native coronary artery 11/21/2019    Cataract     Diabetes mellitus     Diabetic retinopathy     Dialysis patient     DM type 2 causing renal disease, not at goal     ESRD (end stage renal disease) started dialysis 01/2014 06/05/2014    H/o acute coronary syndrome with high troponin 03/11/2024    History of colon polyps 02/10/2021    History of COVID-19 12/29/2022    Hyperparathyroidism, secondary renal 06/05/2014    Hypertension     Hypertensive emergency 11/27/2023    NSTEMI (non-ST elevated myocardial infarction) 12/21/2013     Organ transplant candidate 06/05/2014    Palliative care encounter 10/29/2024    Pleural effusion 06/07/2024    Pneumonia     Post PTCA 08/26/2020    RCA [facility] 7-25-20    Renal cell carcinoma of right kidney     Renal hypertension     Stroke        Past Surgical History:   Procedure Laterality Date    ANGIOGRAM, CORONARY, WITH LEFT HEART CATHETERIZATION N/A 7/1/2021    Procedure: Angiogram, Coronary, with Left Heart Cath;  Surgeon: Miki Zendejas MD;  Location: [facility] CATH LAB;  Service: Cardiology;  Laterality: N/A;    ANGIOGRAM, CORONARY, WITH LEFT HEART CATHETERIZATION N/A 11/28/2023    Procedure: Angiogram, Coronary, with Left Heart Cath;  Surgeon: Noah Pendleton MD;  Location: [facility] CATH LAB;  Service: Cardiology;  Laterality: N/A;    COLONOSCOPY N/A 5/3/2017    Procedure: COLONOSCOPY;  Surgeon: Rufino Carpenter MD;  [facility]  Service: Endoscopy;  Laterality: N/A;  pt states can only schedule on Wednesdays    COLONOSCOPY N/A 2/9/2021    Procedure: COLONOSCOPY;  Surgeon: Edil Wong MD;  [facility]  Service: Endoscopy;  Laterality: N/A;  Dialysis MWF/ labwork day of procedure  right arm aceess  per Dr. Colin pt can hold Plavix 5 days prior see note- sm  COVID test on 2/6/21 at [facility] - sm    COLONOSCOPY N/A 2/10/2021    Procedure: COLONOSCOPY;  Surgeon: Robert Ayers MD;  [facility]  Service: Endoscopy;  Laterality: N/A;  rescheduled due to poor bowel prep-BB  negative covid screening 2/6/21-BB  dialysis M-W-F-BB  okay to r/s for 2/9/21 and to hold Plavix per Dr. KIRAN Wong-BB  labs same day-BB    CORONARY ANGIOGRAPHY N/A 2/10/2025    Procedure: ANGIOGRAM, CORONARY ARTERY;  Surgeon: Ajith Hutchins MD;  Location: [facility] CATH LAB;  Service: Cardiology;  Laterality: N/A;    CORONARY STENT PLACEMENT N/A 7/25/2019    Procedure: INSERTION, STENT, CORONARY ARTERY;  Surgeon: Miki Zendejas MD;  Location: [facility] CATH LAB;  Service: Cardiology;   Laterality: N/A;    DIALYSIS FISTULA CREATION      FISTULOGRAM N/A 2/18/2019    Procedure: Fistulogram;  Surgeon: Yaneth De La Cruz MD;  Location: [facility] CATH LAB;  Service: Cardiology;  Laterality: N/A;    FISTULOGRAM Right 7/23/2019    Procedure: Fistulogram;  Surgeon: Oz Cordoba MD;  Location: [facility] CATH LAB;  Service: Cardiology;  Laterality: Right;    LAPAROSCOPIC ROBOT-ASSISTED SURGICAL REMOVAL OF KIDNEY USING DA JAIME XI Right 5/19/2022    Procedure: XI ROBOTIC NEPHRECTOMY;  Surgeon: Aidan Hendricks MD;  [address];  Service: Urology;  Laterality: Right;  3hrs    LAPAROSCOPIC ROBOT-ASSISTED SURGICAL REMOVAL OF KIDNEY USING DA JAIME XI Left 1/5/2023    Procedure: XI ROBOTIC NEPHRECTOMY;  Surgeon: Aidan Hendricks MD;  [address];  Service: Urology;  Laterality: Left;  4 hrs    LEFT HEART CATHETERIZATION Left 7/25/2019    Procedure: Left heart cath;  Surgeon: Miki Zendejas MD;  Location: [facility] CATH LAB;  Service: Cardiology;  Laterality: Left;    LEFT HEART CATHETERIZATION Left 2/10/2025    Procedure: Left heart cath;  Surgeon: Ajith Hutchins MD;  Location: [facility] CATH LAB;  Service: Cardiology;  Laterality: Left;    REPAIR  1/5/2023    Procedure: REPAIR; COLOTOMY;  Surgeon: Maikel Lamb MD;  Location: [address];  Service: General;;    RETINAL LASER PROCEDURE Bilateral 2018 or 2017    Dr. Rothman    VASCULAR SURGERY         Review of patient's allergies indicates:   Allergen Reactions    No known allergies      Current Facility-Administered Medications   Medication Frequency    0.9%  NaCl infusion (for blood administration) Q24H PRN    acetaminophen tablet 650 mg Q4H PRN    albuterol-ipratropium 2.5 mg-0.5 mg/3 mL nebulizer solution 3 mL Q4H PRN    aluminum-magnesium hydroxide-simethicone 200-200-20 mg/5 mL suspension 30 mL QID PRN    atorvastatin tablet 80 mg Daily    carvediloL tablet 25 mg BID WM    cefTRIAXone injection 1 g Q24H     dextromethorphan-guaiFENesin  mg per 12 hr tablet 1 tablet BID    doxycycline tablet 100 mg Q12H    ezetimibe tablet 10 mg Daily    hydrALAZINE tablet 25 mg Q8H    isosorbide mononitrate 24 hr tablet 120 mg Daily    melatonin tablet 6 mg Nightly PRN    naloxone 0.4 mg/mL injection 0.02 mg PRN    NIFEdipine 24 hr tablet 60 mg BID    ondansetron disintegrating tablet 4 mg Q6H PRN    polyethylene glycol packet 17 g Daily PRN    prochlorperazine injection Soln 5 mg Q6H PRN    ranolazine 12 hr tablet 500 mg Daily    sodium bicarbonate tablet 650 mg TID    sodium chloride 0.9% flush 5 mL PRN     Current Outpatient Medications   Medication    acetaminophen (TYLENOL) 500 MG tablet    aspirin (ECOTRIN) 81 MG EC tablet    atorvastatin (LIPITOR) 80 MG tablet    carvediloL (COREG) 25 MG tablet    clopidogreL (PLAVIX) 75 mg tablet    ezetimibe (ZETIA) 10 mg tablet    hydrALAZINE (APRESOLINE) 25 MG tablet    isosorbide mononitrate (IMDUR) 120 MG 24 hr tablet    NIFEdipine (PROCARDIA-XL) 60 MG (OSM) 24 hr tablet    ranolazine (RANEXA) 500 MG Tb12    sevelamer carbonate (RENVELA) 800 mg Tab    nitroGLYCERIN (NITROSTAT) 0.4 MG SL tablet     Facility-Administered Medications Ordered in Other Encounters   Medication Frequency    lidocaine (PF) 10 mg/ml (1%) injection 10 mg Once     Family History       Problem Relation (Age of Onset)    Cancer Maternal Grandfather    Cataracts Mother    Diabetes Mother    Heart disease Brother    Hypertension Mother, Father, Sister, Brother    Kidney disease Brother          Tobacco Use    Smoking status: Never    Smokeless tobacco: Never   Substance and Sexual Activity    Alcohol use: Not Currently     Comment: One drink a month    Drug use: No    Sexual activity: Yes     Partners: Female     Birth control/protection: None     Review of Systems   Constitutional:  Negative for fever.   HENT:  Negative for congestion.    Respiratory:  Positive for cough and shortness of breath.    Cardiovascular:   Negative for chest pain.   Gastrointestinal:  Negative for diarrhea, nausea and vomiting.   Musculoskeletal:  Negative for myalgias.     Objective:     Vital Signs (Most Recent):  Temp: 99 °F (37.2 °C) (03/11/25 1131)  Pulse: 97 (03/11/25 1131)  Resp: 19 (03/11/25 1131)  BP: 95/61 (03/11/25 1131)  SpO2: 96 % (03/11/25 1131) Vital Signs (24h Range):  Temp:  [97.7 °F (36.5 °C)-99 °F (37.2 °C)] 99 °F (37.2 °C)  Pulse:  [] 97  Resp:  [16-25] 19  SpO2:  [92 %-98 %] 96 %  BP: ()/(52-85) 95/61     Weight: 71.1 kg (156 lb 12 oz) (03/10/25 2124)  Body mass index is 24.55 kg/m².  Body surface area is 1.83 meters squared.    I/O last 3 completed shifts:  In: 875 [Blood:375; Other:500]  Out: 2500 [Other:2500]     Physical Exam  Vitals and nursing note reviewed.   Constitutional:       General: He is not in acute distress.     Appearance: He is ill-appearing (chronically).   HENT:      Head: Normocephalic.   Eyes:      Conjunctiva/sclera: Conjunctivae normal.   Cardiovascular:      Rate and Rhythm: Normal rate.   Pulmonary:      Effort: Pulmonary effort is normal. No respiratory distress.      Breath sounds: No wheezing or rales.      Comments: Decreased breath sounds bilateral base of lungs  Musculoskeletal:      Right lower leg: Edema present.      Left lower leg: Edema present.   Skin:     General: Skin is warm and dry.   Neurological:      General: No focal deficit present.      Mental Status: He is alert.   Psychiatric:         Mood and Affect: Mood normal.          Significant Labs:  CBC:   Recent Labs   Lab 03/11/25  0915   WBC 12.10   RBC 2.87*   HGB 8.4*   HCT 25.8*      MCV 90   MCH 29.3   MCHC 32.6     CMP:   Recent Labs   Lab 03/11/25  0915   *   CALCIUM 9.2   ALBUMIN 2.4*   PROT 7.2      K 3.6   CO2 25   CL 99   BUN 51*   CREATININE 7.4*   ALKPHOS 87   ALT 15   AST 18   BILITOT 0.8     All labs within the past 24 hours have been reviewed.    Assessment/Plan:     Pulmonary  * Acute  hypoxemic respiratory failure  -management per primary  -UF with HD    Multifocal pneumonia  -management per primary  -on Rocephin and doxy    Renal/  ESRD (end stage renal disease)  49 year old male hx of ESRD on HD MWF presenting for SOB. Admitted with pneumonia and volume overload. Reports last HD last Friday but states he may have missed a session last week. Had emergent HD this AM in the ED with net UF 2 L. Reports breathing somewhat improved after HD. Remains on 2 L NC. Nephrology consulted for management of ESRD.    ESRD on iHD MWF  [facility]  4 hours  EDW:  69.5 kg, now adjusted to 71.5 kg?  RFA AVF    Plan/Recommendations  ESRD on HD:  -HD this AM with 2 L UF removed. Electrolytes stable. Will plan for HD tomorrow to resume MWF schedule and for continued metabolic clearance and volume management.   -Will continue iHD thrice weekly unless emergent indication arises  -Strict I&Os  -Pre & post HD weight  Anemia in ESRD  -Hgb goal 10-11, hgb  8.2 s/p 1 unit RBC yesterday  -Resume Epo with HD  Mineral Bone Disease in ESRD  -Continue OP phos binders  -Renal diet if not NPO          Thank you for your consult. I will follow-up with patient. Please contact us if you have any additional questions.    Katherine Potts PA-C  Nephrology  [facility] - [facility] Dept

## 2025-03-11 NOTE — ED NOTES
Pt's pulse ox moved to right ear lobe + 100% on 5 LPM via NC at this time. Does not wear oxygen at baseline. Denies shortness of breath. No acute distress observed. Oxygen weaned to 2 LPM via NC w/ oxygen saturation at 97% at this time.

## 2025-03-11 NOTE — ASSESSMENT & PLAN NOTE
Multifactorial with volume overload in the setting of ESRD and missed dialysis as well as possible pneumonia, see below.  No evidence of PE on CTA  Titrate O2 for sat > 90%  Volume management with HD per Nephrology  Continue antibiotics as below

## 2025-03-11 NOTE — H&P
[facility]  Hospital Medicine  History & Physical    Patient Name: Haroldo Dickinson  MRN: [id number]  Patient Class: OP- Observation  Admission Date: 3/10/2025  Attending Physician: Montse Elena MD   Primary Care Provider: Mireya Larose MD         Patient information was obtained from patient, past medical records, and ER records.     Subjective:     Principal Problem:Acute hypoxemic respiratory failure    Chief Complaint   Patient presents with    Shortness of Breath     Arrived via EMS on CPAP        HPI: 48 yo M with extensive past medical history as below, notably CVA, MVCAD not amenable to revascularization by PCI or CABG, HFpEF, anuric ESRD HD MWF, RCC s/p nephrectomy who presents to the ED with chief complaint of SOB.  Pt says symptoms began 3 days ago.  He describes associated dry cough, congestion, dyspnea on minimal exertion, palpitations, lower extremity edema with pain, stable orthopnea, and stable chest pain.  Last HD 3/7, pt missed 3/10 d/t acute illness.  He denies headache, change to vision, fevers, chills, abdominal pain, nausea, vomiting, diarrhea, constipation, blood in stool, or numbness or tingling in extremities.    In the ED, D-dimer was elevated and CTA chest ordered with no evidence of PE but revealed bilateral infiltrates concerning for infection or edema.  Hgb 6.2, 1 unit PRBCs transfused.  Nephrology consulted and plan for dialysis    Past Medical History:   Diagnosis Date    CAD (coronary artery disease), native coronary artery 11/21/2019    Cataract     Diabetes mellitus     Diabetic retinopathy     Dialysis patient     DM type 2 causing renal disease, not at goal     ESRD (end stage renal disease) started dialysis 01/2014 06/05/2014    H/o acute coronary syndrome with high troponin 03/11/2024    History of colon polyps 02/10/2021    History of COVID-19 12/29/2022    Hyperparathyroidism, secondary renal 06/05/2014    Hypertension     Hypertensive emergency 11/27/2023     NSTEMI (non-ST elevated myocardial infarction) 12/21/2013    Organ transplant candidate 06/05/2014    Palliative care encounter 10/29/2024    Pleural effusion 06/07/2024    Pneumonia     Post PTCA 08/26/2020    RCA HILLARY 7-25-20    Renal cell carcinoma of right kidney     Renal hypertension     Stroke        Past Surgical History:   Procedure Laterality Date    ANGIOGRAM, CORONARY, WITH LEFT HEART CATHETERIZATION N/A 7/1/2021    Procedure: Angiogram, Coronary, with Left Heart Cath;  Surgeon: Miki Zendejas MD;  Location: [facility] CATH LAB;  Service: Cardiology;  Laterality: N/A;    ANGIOGRAM, CORONARY, WITH LEFT HEART CATHETERIZATION N/A 11/28/2023    Procedure: Angiogram, Coronary, with Left Heart Cath;  Surgeon: Noah Pendleton MD;  Location: [facility] CATH LAB;  Service: Cardiology;  Laterality: N/A;    COLONOSCOPY N/A 5/3/2017    Procedure: COLONOSCOPY;  Surgeon: Rufino Carpenter MD;  [facility]  Service: Endoscopy;  Laterality: N/A;  pt states can only schedule on Wednesdays    COLONOSCOPY N/A 2/9/2021    Procedure: COLONOSCOPY;  Surgeon: Edil Wong MD;  [facility]  Service: Endoscopy;  Laterality: N/A;  Dialysis MWF/ labwork day of procedure  right arm aceess  per Dr. Colin pt can hold Plavix 5 days prior see note- sm  COVID test on 2/6/21 at [facility] -     COLONOSCOPY N/A 2/10/2021    Procedure: COLONOSCOPY;  Surgeon: Robert Ayers MD;  [facility]  Service: Endoscopy;  Laterality: N/A;  rescheduled due to poor bowel prep-BB  negative covid screening 2/6/21-BB  dialysis M-W-F-BB  okay to r/s for 2/9/21 and to hold Plavix per Dr. KIRAN Wong-BB  labs same day-BB    CORONARY ANGIOGRAPHY N/A 2/10/2025    Procedure: ANGIOGRAM, CORONARY ARTERY;  Surgeon: Ajith Hutchins MD;  Location: [facility] CATH LAB;  Service: Cardiology;  Laterality: N/A;    CORONARY STENT PLACEMENT N/A 7/25/2019    Procedure: INSERTION, STENT, CORONARY ARTERY;  Surgeon: Miki Zendejas,  MD;  Location: [facility] CATH LAB;  Service: Cardiology;  Laterality: N/A;    DIALYSIS FISTULA CREATION      FISTULOGRAM N/A 2/18/2019    Procedure: Fistulogram;  Surgeon: Yaneth De La Cruz MD;  Location: [facility] CATH LAB;  Service: Cardiology;  Laterality: N/A;    FISTULOGRAM Right 7/23/2019    Procedure: Fistulogram;  Surgeon: Oz Cordoba MD;  Location: [facility] CATH LAB;  Service: Cardiology;  Laterality: Right;    LAPAROSCOPIC ROBOT-ASSISTED SURGICAL REMOVAL OF KIDNEY USING DA JAIME XI Right 5/19/2022    Procedure: XI ROBOTIC NEPHRECTOMY;  Surgeon: Aidan Hendricks MD;  Location: [address] FLR;  Service: Urology;  Laterality: Right;  3hrs    LAPAROSCOPIC ROBOT-ASSISTED SURGICAL REMOVAL OF KIDNEY USING DA JAIME XI Left 1/5/2023    Procedure: XI ROBOTIC NEPHRECTOMY;  Surgeon: Aidan Hendricks MD;  [address]  Service: Urology;  Laterality: Left;  4 hrs    LEFT HEART CATHETERIZATION Left 7/25/2019    Procedure: Left heart cath;  Surgeon: Miki Zendejas MD;  Location: [facility] CATH LAB;  Service: Cardiology;  Laterality: Left;    LEFT HEART CATHETERIZATION Left 2/10/2025    Procedure: Left heart cath;  Surgeon: Ajith Hutchins MD;  Location: [facility] CATH LAB;  Service: Cardiology;  Laterality: Left;    REPAIR  1/5/2023    Procedure: REPAIR; COLOTOMY;  Surgeon: Maikel Lamb MD;  [address]  Service: General;;    RETINAL LASER PROCEDURE Bilateral 2018 or 2017    Dr. Rothman    VASCULAR SURGERY         Review of patient's allergies indicates:   Allergen Reactions    No known allergies        Current Facility-Administered Medications on File Prior to Encounter   Medication    lidocaine (PF) 10 mg/ml (1%) injection 10 mg     Current Outpatient Medications on File Prior to Encounter   Medication Sig    aspirin (ECOTRIN) 81 MG EC tablet Take 1 tablet (81 mg total) by mouth once daily.    atorvastatin (LIPITOR) 80 MG tablet Take 1 tablet (80 mg total) by mouth once daily.     carvediloL (COREG) 25 MG tablet Take 1 tablet (25 mg total) by mouth 2 (two) times daily with meals.    clopidogreL (PLAVIX) 75 mg tablet Take 1 tablet (75 mg total) by mouth once daily.    epoetin philip (PROCRIT) 4,000 unit/mL injection Inject 1.93 mLs (7,720 Units total) into the skin every Mon, Wed, Fri.    ezetimibe (ZETIA) 10 mg tablet Take 1 tablet (10 mg total) by mouth once daily.    hydrALAZINE (APRESOLINE) 25 MG tablet Take 1 tablet (25 mg total) by mouth every 8 (eight) hours.    isosorbide mononitrate (IMDUR) 120 MG 24 hr tablet Take 1 tablet (120 mg total) by mouth once daily.    methocarbamoL (ROBAXIN) 500 MG Tab Take 1 tablet (500 mg total) by mouth 4 (four) times daily as needed (muscle spasms).    methoxy peg-epoetin beta (MIRCERA INJ) 150 mcg.    NIFEdipine (PROCARDIA-XL) 60 MG (OSM) 24 hr tablet Take 1 tablet (60 mg total) by mouth 2 (two) times a day.    nitroGLYCERIN (NITROSTAT) 0.4 MG SL tablet Place 1 tablet (0.4 mg total) under the tongue every 5 (five) minutes as needed for Chest pain.    ranolazine (RANEXA) 500 MG Tb12 Take 1 tablet (500 mg total) by mouth Daily.    sevelamer carbonate (RENVELA) 800 mg Tab Take 2 tablets (1,600 mg total) by mouth 3 (three) times daily with meals.    tenapanor (XPHOZAH) 20 mg Tab Take 1 tablet by mouth.    [DISCONTINUED] minoxidiL (LONITEN) 10 MG Tab Take 1 tablet (10 mg total) by mouth 2 (two) times daily.     Family History       Problem Relation (Age of Onset)    Cancer Maternal Grandfather    Cataracts Mother    Diabetes Mother    Heart disease Brother    Hypertension Mother, Father, Sister, Brother    Kidney disease Brother          Tobacco Use    Smoking status: Never    Smokeless tobacco: Never   Substance and Sexual Activity    Alcohol use: Not Currently     Comment: One drink a month    Drug use: No    Sexual activity: Yes     Partners: Female     Birth control/protection: None     Review of Systems: See HPI  Objective:     Vital Signs (Most  Recent):  Temp: 98.1 °F (36.7 °C) (03/11/25 0350)  Pulse: 101 (03/11/25 0645)  Resp: 18 (03/11/25 0645)  BP: 126/64 (03/11/25 0645)  SpO2: 97 % (03/11/25 0350) Vital Signs (24h Range):  Temp:  [97.7 °F (36.5 °C)-98.5 °F (36.9 °C)] 98.1 °F (36.7 °C)  Pulse:  [] 101  Resp:  [16-25] 18  SpO2:  [92 %-98 %] 97 %  BP: ()/(52-79) 126/64     Weight: 71.1 kg (156 lb 12 oz)  Body mass index is 24.55 kg/m².     Physical Exam  Vitals reviewed.   Constitutional:       General: He is not in acute distress.     Appearance: He is not toxic-appearing.   HENT:      Mouth/Throat:      Mouth: Mucous membranes are moist.   Eyes:      General: No scleral icterus.  Cardiovascular:      Rate and Rhythm: Normal rate and regular rhythm.      Heart sounds: Normal heart sounds.   Pulmonary:      Effort: Pulmonary effort is normal. No respiratory distress.      Breath sounds: Rhonchi and rales (Bilateral) present.      Comments: Diminished breath sounds bilaterally.  On 4-5 L  Abdominal:      General: Bowel sounds are normal. There is no distension.      Palpations: Abdomen is soft.      Tenderness: There is no abdominal tenderness.   Musculoskeletal:      Right lower leg: Edema (2+) present.      Left lower leg: Edema (2+) present.   Skin:     General: Skin is warm and dry.   Neurological:      General: No focal deficit present.      Mental Status: He is alert and oriented to person, place, and time.   Psychiatric:         Mood and Affect: Mood normal.         Behavior: Behavior normal.     Significant Labs personally reviewed:  WBC 11.79, Hgb 6.2, D-dimer 2.25, CO2 14, BUN 85, Cr 10.3, BNP 2282, troponin 2502 (7124 on 03/03).    EKG personally reviewed: Intraventricular block, nonspecific T-wave changes, similar to previous    Assessment/Plan:     * Acute hypoxemic respiratory failure  Multifactorial with volume overload in the setting of ESRD and missed dialysis as well as possible pneumonia, see below.  No evidence of PE on  "CTA  Titrate O2 for sat > 90%  Volume management with HD per Nephrology  Continue antibiotics as below    Acute on chronic heart failure with preserved ejection fraction (HFpEF)  Volume management with HD  Continue home GDMT regimen  Elevated BNP and troponin in the setting of ESRD  Strict I/Os, 1.5L fluid restriction, daily weights  Will continue to monitor on tele     Multifocal pneumonia  Afebrile with no leukocytosis but unable to rule out PNA based on hypoxia, bilateral rales, CTA chest results and cough.  Procalcitonin unreliable in ESRD  Sputum culture pending  MRSA screen and RIP pending  Symptomatic management  Continue ceftriaxone and doxycycline ()    Multivessel coronary artery disease  Per [facility] report 02/2025:  "Extensive multivessel coronary artery disease which is not amenable to revascularization (either PCI or bypass surgery - transmural scar in distal LAD/left circumflex/PLB noted on PET scan done in October, 2024) would recommend maximal medical therapy"  Continue home medication regimen as BP will allow    Anemia in end-stage renal disease  No reports of active bleeding  S/p 1 unit PRBCs in the ED  EPO per nephrology      VTE Risk Mitigation (From admission, onward)           Ordered     Reason for No Pharmacological VTE Prophylaxis  Once        Question:  Reasons:  Answer:  Risk of Bleeding    03/11/25 0125     IP VTE HIGH RISK PATIENT  Once         03/11/25 0125                  On 03/11/2025, patient should be placed in hospital observation services under my care.      Abiel Joseph III, MD  Department of Hospital Medicine  [facility]          "

## 2025-03-11 NOTE — PLAN OF CARE
Hospital Medicine Plan of Care Note    Admission H&P dated earlier this morning reviewed, and agree with assessment and plan as documented. Pt seen and examined this morning on roundsPATTI. SOB improving with dialysis. Continue IV abx treatment. Monitor H&H s/p 1 unit of pRBCs. EPO with dialysis. Attempted to wean off oxygen with decrease in O2 saturation. Wean as tolerated. Recheck H&H in the AM to ensure no futher concerns of acute blood loss anemia.     Montse Elena MD  Attending Physician  Department of Hospital Medicine  3/11/2025

## 2025-03-11 NOTE — ED NOTES
Pt placed back on 2 LPM via NC d/t oxygen saturation ~89% at this time. 95% oxygen saturation after oxygen application.

## 2025-03-11 NOTE — ASSESSMENT & PLAN NOTE
49 year old male hx of ESRD on HD MWF presenting for SOB. Admitted with pneumonia and volume overload. Reports last HD last Friday but states he may have missed a session last week. Had emergent HD this AM in the ED with net UF 2 L. Reports breathing somewhat improved after HD. Remains on 2 L NC. Nephrology consulted for management of ESRD.    ESRD on iHD MWF  [facility]  4 hours  EDW:  69.5 kg, now adjusted to 71.5 kg?  RFA AVF    Plan/Recommendations  ESRD on HD:  -HD this AM with 2 L UF removed. Electrolytes stable. Will plan for HD tomorrow to resume MWF schedule and for continued metabolic clearance and volume management.   -Will continue iHD thrice weekly unless emergent indication arises  -Strict I&Os  -Pre & post HD weight  Anemia in ESRD  -Hgb goal 10-11, hgb  8.2 s/p 1 unit RBC yesterday  -Resume Epo with HD  Mineral Bone Disease in ESRD  -Continue OP phos binders  -Renal diet if not NPO

## 2025-03-11 NOTE — SUBJECTIVE & OBJECTIVE
Past Medical History:   Diagnosis Date    CAD (coronary artery disease), native coronary artery 11/21/2019    Cataract     Diabetes mellitus     Diabetic retinopathy     Dialysis patient     DM type 2 causing renal disease, not at goal     ESRD (end stage renal disease) started dialysis 01/2014 06/05/2014    H/o acute coronary syndrome with high troponin 03/11/2024    History of colon polyps 02/10/2021    History of COVID-19 12/29/2022    Hyperparathyroidism, secondary renal 06/05/2014    Hypertension     Hypertensive emergency 11/27/2023    NSTEMI (non-ST elevated myocardial infarction) 12/21/2013    Organ transplant candidate 06/05/2014    Palliative care encounter 10/29/2024    Pleural effusion 06/07/2024    Pneumonia     Post PTCA 08/26/2020    RCA HILLARY 7-25-20    Renal cell carcinoma of right kidney     Renal hypertension     Stroke        Past Surgical History:   Procedure Laterality Date    ANGIOGRAM, CORONARY, WITH LEFT HEART CATHETERIZATION N/A 7/1/2021    Procedure: Angiogram, Coronary, with Left Heart Cath;  Surgeon: Miki Zendejas MD;  Location: [facility] CATH LAB;  Service: Cardiology;  Laterality: N/A;    ANGIOGRAM, CORONARY, WITH LEFT HEART CATHETERIZATION N/A 11/28/2023    Procedure: Angiogram, Coronary, with Left Heart Cath;  Surgeon: Noah Pendleton MD;  Location: [facility] CATH LAB;  Service: Cardiology;  Laterality: N/A;    COLONOSCOPY N/A 5/3/2017    Procedure: COLONOSCOPY;  Surgeon: Rufino Carpenter MD;  Location: [address]);  Service: Endoscopy;  Laterality: N/A;  pt states can only schedule on Wednesdays    COLONOSCOPY N/A 2/9/2021    Procedure: COLONOSCOPY;  Surgeon: Edil Wong MD;  Location: [address]);  Service: Endoscopy;  Laterality: N/A;  Dialysis MWF/ labwork day of procedure  right arm aceess  per Dr. Colin pt can hold Plavix 5 days prior see note- sm  COVID test on 2/6/21 at [facility] - sm    COLONOSCOPY N/A 2/10/2021    Procedure: COLONOSCOPY;  Surgeon: Robert Ayers,  MD;  Location: [facility] ENDO (4TH FLR);  Service: Endoscopy;  Laterality: N/A;  rescheduled due to poor bowel prep-BB  negative covid screening 2/6/21-BB  dialysis M-W-F-BB  okay to r/s for 2/9/21 and to hold Plavix per Dr. KIRAN Wong-BB  labs same day-BB    CORONARY ANGIOGRAPHY N/A 2/10/2025    Procedure: ANGIOGRAM, CORONARY ARTERY;  Surgeon: Ajith Hutchins MD;  Location: [facility] CATH LAB;  Service: Cardiology;  Laterality: N/A;    CORONARY STENT PLACEMENT N/A 7/25/2019    Procedure: INSERTION, STENT, CORONARY ARTERY;  Surgeon: Miki Zendejas MD;  Location: [facility] CATH LAB;  Service: Cardiology;  Laterality: N/A;    DIALYSIS FISTULA CREATION      FISTULOGRAM N/A 2/18/2019    Procedure: Fistulogram;  Surgeon: Yaneth De La Cruz MD;  Location: [facility] CATH LAB;  Service: Cardiology;  Laterality: N/A;    FISTULOGRAM Right 7/23/2019    Procedure: Fistulogram;  Surgeon: Oz Cordoba MD;  Location: [facility] CATH LAB;  Service: Cardiology;  Laterality: Right;    LAPAROSCOPIC ROBOT-ASSISTED SURGICAL REMOVAL OF KIDNEY USING DA JAIME XI Right 5/19/2022    Procedure: XI ROBOTIC NEPHRECTOMY;  Surgeon: Aidan Hendricks MD;  [address]  Service: Urology;  Laterality: Right;  3hrs    LAPAROSCOPIC ROBOT-ASSISTED SURGICAL REMOVAL OF KIDNEY USING DA JAIME XI Left 1/5/2023    Procedure: XI ROBOTIC NEPHRECTOMY;  Surgeon: Aidan Hendricks MD;  [address]  Service: Urology;  Laterality: Left;  4 hrs    LEFT HEART CATHETERIZATION Left 7/25/2019    Procedure: Left heart cath;  Surgeon: Miki Zendejas MD;  Location: [facility] CATH LAB;  Service: Cardiology;  Laterality: Left;    LEFT HEART CATHETERIZATION Left 2/10/2025    Procedure: Left heart cath;  Surgeon: Ajith Hutchins MD;  Location: [facility] CATH LAB;  Service: Cardiology;  Laterality: Left;    REPAIR  1/5/2023    Procedure: REPAIR; COLOTOMY;  Surgeon: Maikel Lamb MD;  Location: [facility] 2ND FLR;  Service: General;;    RETINAL LASER PROCEDURE  Bilateral 2018 or 2017    Dr. Rothman    VASCULAR SURGERY         Review of patient's allergies indicates:   Allergen Reactions    No known allergies      Current Facility-Administered Medications   Medication Frequency    0.9%  NaCl infusion (for blood administration) Q24H PRN    acetaminophen tablet 650 mg Q4H PRN    albuterol-ipratropium 2.5 mg-0.5 mg/3 mL nebulizer solution 3 mL Q4H PRN    aluminum-magnesium hydroxide-simethicone 200-200-20 mg/5 mL suspension 30 mL QID PRN    atorvastatin tablet 80 mg Daily    carvediloL tablet 25 mg BID WM    cefTRIAXone injection 1 g Q24H    dextromethorphan-guaiFENesin  mg per 12 hr tablet 1 tablet BID    doxycycline tablet 100 mg Q12H    ezetimibe tablet 10 mg Daily    hydrALAZINE tablet 25 mg Q8H    isosorbide mononitrate 24 hr tablet 120 mg Daily    melatonin tablet 6 mg Nightly PRN    naloxone 0.4 mg/mL injection 0.02 mg PRN    NIFEdipine 24 hr tablet 60 mg BID    ondansetron disintegrating tablet 4 mg Q6H PRN    polyethylene glycol packet 17 g Daily PRN    prochlorperazine injection Soln 5 mg Q6H PRN    ranolazine 12 hr tablet 500 mg Daily    sodium bicarbonate tablet 650 mg TID    sodium chloride 0.9% flush 5 mL PRN     Current Outpatient Medications   Medication    acetaminophen (TYLENOL) 500 MG tablet    aspirin (ECOTRIN) 81 MG EC tablet    atorvastatin (LIPITOR) 80 MG tablet    carvediloL (COREG) 25 MG tablet    clopidogreL (PLAVIX) 75 mg tablet    ezetimibe (ZETIA) 10 mg tablet    hydrALAZINE (APRESOLINE) 25 MG tablet    isosorbide mononitrate (IMDUR) 120 MG 24 hr tablet    NIFEdipine (PROCARDIA-XL) 60 MG (OSM) 24 hr tablet    ranolazine (RANEXA) 500 MG Tb12    sevelamer carbonate (RENVELA) 800 mg Tab    nitroGLYCERIN (NITROSTAT) 0.4 MG SL tablet     Facility-Administered Medications Ordered in Other Encounters   Medication Frequency    lidocaine (PF) 10 mg/ml (1%) injection 10 mg Once     Family History       Problem Relation (Age of Onset)    Cancer Maternal  Grandfather    Cataracts Mother    Diabetes Mother    Heart disease Brother    Hypertension Mother, Father, Sister, Brother    Kidney disease Brother          Tobacco Use    Smoking status: Never    Smokeless tobacco: Never   Substance and Sexual Activity    Alcohol use: Not Currently     Comment: One drink a month    Drug use: No    Sexual activity: Yes     Partners: Female     Birth control/protection: None     Review of Systems   Constitutional:  Negative for fever.   HENT:  Negative for congestion.    Respiratory:  Positive for cough and shortness of breath.    Cardiovascular:  Negative for chest pain.   Gastrointestinal:  Negative for diarrhea, nausea and vomiting.   Musculoskeletal:  Negative for myalgias.     Objective:     Vital Signs (Most Recent):  Temp: 99 °F (37.2 °C) (03/11/25 1131)  Pulse: 97 (03/11/25 1131)  Resp: 19 (03/11/25 1131)  BP: 95/61 (03/11/25 1131)  SpO2: 96 % (03/11/25 1131) Vital Signs (24h Range):  Temp:  [97.7 °F (36.5 °C)-99 °F (37.2 °C)] 99 °F (37.2 °C)  Pulse:  [] 97  Resp:  [16-25] 19  SpO2:  [92 %-98 %] 96 %  BP: ()/(52-85) 95/61     Weight: 71.1 kg (156 lb 12 oz) (03/10/25 2124)  Body mass index is 24.55 kg/m².  Body surface area is 1.83 meters squared.    I/O last 3 completed shifts:  In: 875 [Blood:375; Other:500]  Out: 2500 [Other:2500]     Physical Exam  Vitals and nursing note reviewed.   Constitutional:       General: He is not in acute distress.     Appearance: He is ill-appearing (chronically).   HENT:      Head: Normocephalic.   Eyes:      Conjunctiva/sclera: Conjunctivae normal.   Cardiovascular:      Rate and Rhythm: Normal rate.   Pulmonary:      Effort: Pulmonary effort is normal. No respiratory distress.      Breath sounds: No wheezing or rales.      Comments: Decreased breath sounds bilateral base of lungs  Musculoskeletal:      Right lower leg: Edema present.      Left lower leg: Edema present.   Skin:     General: Skin is warm and dry.   Neurological:       General: No focal deficit present.      Mental Status: He is alert.   Psychiatric:         Mood and Affect: Mood normal.          Significant Labs:  CBC:   Recent Labs   Lab 03/11/25  0915   WBC 12.10   RBC 2.87*   HGB 8.4*   HCT 25.8*      MCV 90   MCH 29.3   MCHC 32.6     CMP:   Recent Labs   Lab 03/11/25  0915   *   CALCIUM 9.2   ALBUMIN 2.4*   PROT 7.2      K 3.6   CO2 25   CL 99   BUN 51*   CREATININE 7.4*   ALKPHOS 87   ALT 15   AST 18   BILITOT 0.8     All labs within the past 24 hours have been reviewed.

## 2025-03-11 NOTE — PROGRESS NOTES
Per ER charge nurse, pt needs to get transferred to another room before starting HD.    Pt also currently has no admit orders.

## 2025-03-11 NOTE — PROGRESS NOTES
3.5H HD tx completed     Net UF: 2L    Needles x2 pulled; manual pressure held for 10 minutes; hemostasis achieved; dressing dry and intact; no oozing or bleeding noted.    Report given to primary RN

## 2025-03-11 NOTE — TELEPHONE ENCOUNTER
----- Message from Luke Gonzalez MD sent at 3/11/2025 12:56 PM CDT -----  Regarding: RE: medication clarification  Patient currently hospitalized, decision should be made by his hospital team.  ----- Message -----  From: Kath Vera MA  Sent: 3/11/2025  11:44 AM CDT  To: Luke Gonzalez Jr., MD  Subject: RE: medication clarification                     I called this person back from [facility] ,she  is asking should this patient be taking Ranexa twice a day instead of once a day. Taking once a day may be a contributing factor to his chest pain and admissions.Please advise.  ----- Message -----  From: Rosetta Martinez  Sent: 3/11/2025   9:56 AM CDT  To: Lisa Butler Staff  Subject: medication clarification                         Pharmacy is calling to clarify an RX.RX name:  ranolazine (RANEXA) 500 MG [id number] do they need to clarify:  dosage and directionsComments: Pls call Stef at [facility] [phone].  She states that pt should be taking twice a day and thinks this may be a contributing factor to his chest pain and admissions.Thank you  ----- Message -----  From: Rosetta Martinez  Sent: 3/11/2025   9:56 AM CDT  To: Lisa Butler Staff  Subject: medication clarification                         Pharmacy is calling to clarify an RX.RX name:  ranolazine (RANEXA) 500 MG [id number] do they need to clarify:  dosage and directionsComments: Pls call Stef at [facility] [phone].  She states that pt should be taking twice a day and thinks this may be a contributing factor to his chest pain and admissions.Thank you

## 2025-03-11 NOTE — PHARMACY MED REC
"    Admission Medication History     The home medication history was taken by Elsie Fernandez.    You may go to "Admission" then "Reconcile Home Medications" tabs to review and/or act upon these items.     The home medication list has been updated by the Pharmacy department.   Please read ALL comments highlighted in yellow.   Please address this information as you see fit.    Feel free to contact us if you have any questions or require assistance.      The medications listed below were removed from the home medication list. Please reorder if appropriate:  Patient reports no longer taking the following medication(s):  Epoetin philip 4,000 unit/ml injection  Methocarbamol 500 mg  Methoxy peg-epoetin beta inj  Tenapanor 20 mg    Medications listed below were obtained from: Patient/family and Analytic software- [employer]  Current Outpatient Medications on File Prior to Encounter   Medication Sig    acetaminophen (TYLENOL) 500 MG tablet Take 1,000 mg by mouth as needed for Pain.    aspirin (ECOTRIN) 81 MG EC tablet Take 1 tablet (81 mg total) by mouth once daily.    atorvastatin (LIPITOR) 80 MG tablet Take 1 tablet (80 mg total) by mouth once daily.    carvediloL (COREG) 25 MG tablet Take 1 tablet (25 mg total) by mouth 2 (two) times daily with meals.    clopidogreL (PLAVIX) 75 mg tablet Take 1 tablet (75 mg total) by mouth once daily.    ezetimibe (ZETIA) 10 mg tablet Take 1 tablet (10 mg total) by mouth once daily.    hydrALAZINE (APRESOLINE) 25 MG tablet Take 1 tablet (25 mg total) by mouth every 8 (eight) hours.    isosorbide mononitrate (IMDUR) 120 MG 24 hr tablet Take 1 tablet (120 mg total) by mouth once daily.    NIFEdipine (PROCARDIA-XL) 60 MG (OSM) 24 hr tablet Take 60 mg by mouth once daily.      ranolazine (RANEXA) 500 MG Tb12 Take 1 tablet (500 mg total) by mouth Daily.  PATIENT IS UNSURE IF THIS MEDICATION SHOULD BE TAKEN ONCE OR TWICE DAILY    sevelamer carbonate (RENVELA) 800 mg Tab Take 2 tablets (1,600 mg " total) by mouth 3 (three) times daily with meals.  NO FILL HISTORY    nitroGLYCERIN (NITROSTAT) 0.4 MG SL tablet Place 1 tablet (0.4 mg total) under the tongue every 5 (five) minutes as needed for Chest pain.         Potential issues to be addressed PRIOR TO DISCHARGE  Patient requested refills for the following medications: (NITROGLYCERIN 0.4 MG)  PATIENT IS UNSURE IF RANOLAZINE 500 MG SHOULD BE TAKEN ONCE OR TWICE DAILY.    Elsie Fernandez  EXT [id number]              .

## 2025-03-11 NOTE — PROGRESS NOTES
1:1 bedside HD OBS tx started per orders    Access: SHEBA GONZALEZ    Pt reports that he only runs at a BFR of 300

## 2025-03-11 NOTE — HOSPITAL COURSE
Continued on IV abx for CAP on the floor and started on urgent dialysis with significant improvement. Attempted to wean O2 but with desaturation was placed back on it. H&H monitored closely and remained stable after 1 unit pRBC. Repeat dialysis session 3/12 which is with his schedule. Weaned to RA. HE was discharged home with close follow up and antibiotics. Met criteria for acute care to continue to follow H&H and other symptoms and he was agreeable to this. Referral placed.     Pt deemed appropriate for discharge; seen and examined prior to departure. Plan discussed with pt, who was agreeable and amenable; medications were discussed and reviewed, outpatient follow-up scheduled, ER precautions were given, all questions were answered to the pt's satisfaction, and he was subsequently discharged.

## 2025-03-11 NOTE — ED NOTES
I-STAT Chem-8+ Results:   Value Reference Range   Sodium 137 136-145 mmol/L   Potassium  4.0 3.5-5.1 mmol/L   Chloride 96  mmol/L   Ionized Calcium 1.16 1.06-1.42 mmol/L   CO2 (measured) 30 23-29 mmol/L   Glucose 151  mg/dL   BUN 44 6-30 mg/dL   Creatinine 9.4 0.5-1.4 mg/dL   Hematocrit 24 36-54%

## 2025-03-11 NOTE — PLAN OF CARE
[facility] Dept  Initial Discharge Assessment       Primary Care Provider: Mireya Larose MD    Admission Diagnosis: Anemia due to chronic kidney disease, on chronic dialysis [N18.6, D63.1, Z99.2]    Admission Date: 3/10/2025    Pt is independent with their ADL's and ambulation, does not require assistance.     Post acute was discussed with pt. Pt d/c home with no needs at the moment. Pt family/friend to provide transportation home.     Pt is on dialysis:  [facility]   MWF  9:30 AM   Expected Discharge Date:     Transition of Care Barriers: (P) None    Payor: [employer] MEDICARE / Plan: [employer] [place] HMO PPO SPECIAL NEEDS / Product Type: Medicare Advantage /     Extended Emergency Contact Information  Primary Emergency Contact: DickinsonGabby  Address: [address] [place] [address] [place]  Mobile Phone: [phone]  Relation: Mother  Secondary Emergency Contact: AlokAriadne  Mobile Phone: [phone]  Relation: Sister    Discharge Plan A: (P) Home  Discharge Plan B: (P) Home      [id number] - [place], [place] - [address]  [address] [address]  Phone: [phone] Fax: [phone]      Initial Assessment (most recent)       Adult Discharge Assessment - 03/11/25 0919          Discharge Assessment    Assessment Type Discharge Planning Assessment     Confirmed/corrected address, phone number and insurance Yes     Confirmed Demographics Correct on Facesheet     Source of Information patient     Does patient/caregiver understand observation status Yes     Reason For Admission Acute on chronic heart failure with preserved ejection fraction (HFpEF)     People in Home alone     Do you expect to return to your current living situation? Yes (P)      Do you have help at home or someone to help you manage your care at home? No (P)      Prior to hospitilization cognitive status: Alert/Oriented (P)      Current  cognitive status: Alert/Oriented (P)      Walking or Climbing Stairs Difficulty no (P)      Dressing/Bathing Difficulty no (P)      Readmission within 30 days? No (P)      Do you currently have service(s) that help you manage your care at home? No (P)      Do you take prescription medications? Yes (P)      Do you have prescription coverage? Yes (P)      Coverage Payor: [employer] [facility]NA[facility]ED MEDICARE [facility] SPECIAL [facility]S - CAPITATED (P)      Do you have any problems affording any of your prescribed medications? No (P)      Is the patient taking medications as prescribed? yes (P)      Who is going to help you get home at discharge? family/friend (P)      How do you get to doctors appointments? health plan transportation (P)      Are you on dialysis? Yes (P)      [facility]ialysis [facility] and [facility]uled [facility] (P)      Do you take coumadin? No (P)      Discharge Plan A Home (P)      Discharge Plan B Home (P)      DME Needed Upon Discharge  none (P)      Discharge Plan discussed with: Patient (P)      Transition of Care Barriers None (P)         Physical Activity    On average, how many days per week do you engage in moderate to strenuous exercise (like a brisk walk)? 0 days (P)      On average, how many minutes do you engage in exercise at this level? 0 min (P)         Financial Resource Strain    How hard is it for you to pay for the very basics like food, housing, medical care, and heating? Not very hard (P)         Housing Stability    In the last 12 months, was there a time when you were not able to pay the mortgage or rent on time? No (P)      At any time in the past 12 months, were you homeless or living in a shelter (including now)? No (P)         Transportation Needs    Has the lack of transportation kept you from medical appointments, meetings, work or from getting things needed for daily living? No (P)         Food Insecurity    Within the past 12 months, you worried that your food would run  out before you got the money to buy more. Never true (P)      Within the past 12 months, the food you bought just didn't last and you didn't have money to get more. Never true (P)         Stress    Do you feel stress - tense, restless, nervous, or anxious, or unable to sleep at night because your mind is troubled all the time - these days? Only a little (P)         Social Isolation    How often do you feel lonely or isolated from those around you?  Never (P)         Alcohol Use    Q1: How often do you have a drink containing alcohol? Never (P)      Q2: How many drinks containing alcohol do you have on a typical day when you are drinking? Patient does not drink (P)      Q3: How often do you have six or more drinks on one occasion? Never (P)         Utilities    In the past 12 months has the electric, gas, oil, or water company threatened to shut off services in your home? No (P)         Health Literacy    How often do you need to have someone help you when you read instructions, pamphlets, or other written material from your doctor or pharmacy? Never (P)                    MIRTA Iyer, CSW.   Case Management  [facility]  Email: [email or web address]

## 2025-03-11 NOTE — TELEPHONE ENCOUNTER
Hilda with [facility] calling because pt with repeat ER visits for chest pain; she states pt states he is taking the ranexa daily, she wanted to see if it was possible for medication to be increased to twice daily; advised her that medication was not prescribed by Dr Larose, pt does have cardiologist that he had been seeing at [facility]; she verbalized understanding and states she will reach out to them

## 2025-03-11 NOTE — ED NOTES
I assumed care of this patient at this time. Report received from [place] Tam. Pt is resting comfortably in [facility] hospital bed + no acute distress observed. Pt is AAOx4. RR is even, unlabored, and spontaneous + pt's oxygen saturation is 100% on 5 LPM via NC at this time. Pt denies pain or needs at this time. Pt on [facility] telebox # [id number]. Bed low and locked; side rails up x2; call light within reach.

## 2025-03-11 NOTE — HPI
50 yo M with extensive past medical history as below, notably CVA, MVCAD not amenable to revascularization by PCI or CABG, HFpEF, anuric ESRD HD MWF, RCC s/p nephrectomy who presents to the ED with chief complaint of SOB.  Pt says symptoms began 3 days ago.  He describes associated dry cough, congestion, dyspnea on minimal exertion, palpitations, lower extremity edema with pain, stable orthopnea, and stable chest pain.  Last HD 3/7, pt missed 3/10 d/t acute illness.  He denies headache, change to vision, fevers, chills, abdominal pain, nausea, vomiting, diarrhea, constipation, blood in stool, or numbness or tingling in extremities.    In the ED, D-dimer was elevated and U/S BLE negative for DVT, CTA chest ordered with no evidence of PE but revealed bilateral infiltrates concerning for infection or edema.  Hgb 6.2, 1 unit PRBCs transfused.  Nephrology consulted and plan for dialysis

## 2025-03-11 NOTE — HPI
48 yo M with extensive past medical history as below, notably CVA, MVCAD not amenable to revascularization by PCI or CABG, HFpEF, anuric ESRD HD MWF, RCC s/p nephrectomy who presents to the ED with chief complaint of SOB.  Pt says symptoms began 3 days ago.  He describes associated dry cough, congestion, dyspnea on minimal exertion, palpitations, lower extremity edema with pain, stable orthopnea, and stable chest pain.  Last HD 3/7, pt missed 3/10 d/t acute illness.  He denies headache, change to vision, fevers, chills, abdominal pain, nausea, vomiting, diarrhea, constipation, blood in stool, or numbness or tingling in extremities.     In the ED, D-dimer was elevated and CTA chest ordered with no evidence of PE but revealed bilateral infiltrates concerning for infection or edema.  Hgb 6.2, 1 unit PRBCs transfused.  Nephrology consulted for ESRD.

## 2025-03-11 NOTE — ASSESSMENT & PLAN NOTE
Volume management with HD  Continue home GDMT regimen  Elevated BNP and troponin in the setting of ESRD  Strict I/Os, 1.5L fluid restriction, daily weights  Will continue to monitor on tele

## 2025-03-12 VITALS
OXYGEN SATURATION: 92 % | HEART RATE: 97 BPM | TEMPERATURE: 99 F | WEIGHT: 156.75 LBS | DIASTOLIC BLOOD PRESSURE: 82 MMHG | RESPIRATION RATE: 20 BRPM | BODY MASS INDEX: 24.55 KG/M2 | SYSTOLIC BLOOD PRESSURE: 132 MMHG

## 2025-03-12 LAB
ALBUMIN SERPL BCP-MCNC: 2.3 G/DL (ref 3.5–5.2)
ALP SERPL-CCNC: 76 U/L (ref 40–150)
ALT SERPL W/O P-5'-P-CCNC: 15 U/L (ref 10–44)
ANION GAP SERPL CALC-SCNC: 14 MMOL/L (ref 8–16)
AST SERPL-CCNC: 22 U/L (ref 10–40)
BASOPHILS # BLD AUTO: 0.07 K/UL (ref 0–0.2)
BASOPHILS NFR BLD: 0.5 % (ref 0–1.9)
BILIRUB SERPL-MCNC: 0.7 MG/DL (ref 0.1–1)
BUN SERPL-MCNC: 75 MG/DL (ref 6–20)
CALCIUM SERPL-MCNC: 8.9 MG/DL (ref 8.7–10.5)
CHLORIDE SERPL-SCNC: 98 MMOL/L (ref 95–110)
CO2 SERPL-SCNC: 23 MMOL/L (ref 23–29)
CREAT SERPL-MCNC: 9.8 MG/DL (ref 0.5–1.4)
DIFFERENTIAL METHOD BLD: ABNORMAL
EOSINOPHIL # BLD AUTO: 0.7 K/UL (ref 0–0.5)
EOSINOPHIL NFR BLD: 5.3 % (ref 0–8)
ERYTHROCYTE [DISTWIDTH] IN BLOOD BY AUTOMATED COUNT: 16.3 % (ref 11.5–14.5)
EST. GFR  (NO RACE VARIABLE): 6 ML/MIN/1.73 M^2
GLUCOSE SERPL-MCNC: 132 MG/DL (ref 70–110)
HBV SURFACE AG SERPL QL IA: NORMAL
HCT VFR BLD AUTO: 24 % (ref 40–54)
HGB BLD-MCNC: 7.7 G/DL (ref 14–18)
IMM GRANULOCYTES # BLD AUTO: 0.09 K/UL (ref 0–0.04)
IMM GRANULOCYTES NFR BLD AUTO: 0.7 % (ref 0–0.5)
LYMPHOCYTES # BLD AUTO: 1.3 K/UL (ref 1–4.8)
LYMPHOCYTES NFR BLD: 9.8 % (ref 18–48)
MAGNESIUM SERPL-MCNC: 1.8 MG/DL (ref 1.6–2.6)
MCH RBC QN AUTO: 28.9 PG (ref 27–31)
MCHC RBC AUTO-ENTMCNC: 32.1 G/DL (ref 32–36)
MCV RBC AUTO: 90 FL (ref 82–98)
MONOCYTES # BLD AUTO: 0.9 K/UL (ref 0.3–1)
MONOCYTES NFR BLD: 6.6 % (ref 4–15)
NEUTROPHILS # BLD AUTO: 9.9 K/UL (ref 1.8–7.7)
NEUTROPHILS NFR BLD: 77.1 % (ref 38–73)
NRBC BLD-RTO: 0 /100 WBC
PHOSPHATE SERPL-MCNC: 5.8 MG/DL (ref 2.7–4.5)
PLATELET # BLD AUTO: 399 K/UL (ref 150–450)
PMV BLD AUTO: 9.4 FL (ref 9.2–12.9)
POTASSIUM SERPL-SCNC: 4.8 MMOL/L (ref 3.5–5.1)
PROT SERPL-MCNC: 6.9 G/DL (ref 6–8.4)
RBC # BLD AUTO: 2.66 M/UL (ref 4.6–6.2)
SODIUM SERPL-SCNC: 135 MMOL/L (ref 136–145)
WBC # BLD AUTO: 12.81 K/UL (ref 3.9–12.7)

## 2025-03-12 PROCEDURE — 83735 ASSAY OF MAGNESIUM: CPT | Mod: HCNC | Performed by: STUDENT IN AN ORGANIZED HEALTH CARE EDUCATION/TRAINING PROGRAM

## 2025-03-12 PROCEDURE — 85025 COMPLETE CBC W/AUTO DIFF WBC: CPT | Mod: HCNC | Performed by: STUDENT IN AN ORGANIZED HEALTH CARE EDUCATION/TRAINING PROGRAM

## 2025-03-12 PROCEDURE — 25000003 PHARM REV CODE 250: Mod: HCNC

## 2025-03-12 PROCEDURE — 84100 ASSAY OF PHOSPHORUS: CPT | Mod: HCNC | Performed by: STUDENT IN AN ORGANIZED HEALTH CARE EDUCATION/TRAINING PROGRAM

## 2025-03-12 PROCEDURE — 80100016 HC MAINTENANCE HEMODIALYSIS: Mod: HCNC

## 2025-03-12 PROCEDURE — 63600175 PHARM REV CODE 636 W HCPCS: Mod: TB,HCNC | Performed by: NURSE PRACTITIONER

## 2025-03-12 PROCEDURE — 87340 HEPATITIS B SURFACE AG IA: CPT | Mod: HCNC | Performed by: STUDENT IN AN ORGANIZED HEALTH CARE EDUCATION/TRAINING PROGRAM

## 2025-03-12 PROCEDURE — 90935 HEMODIALYSIS ONE EVALUATION: CPT | Mod: HCNC,,, | Performed by: NURSE PRACTITIONER

## 2025-03-12 PROCEDURE — 25000003 PHARM REV CODE 250: Mod: HCNC | Performed by: FAMILY MEDICINE

## 2025-03-12 PROCEDURE — 63600175 PHARM REV CODE 636 W HCPCS: Mod: HCNC | Performed by: FAMILY MEDICINE

## 2025-03-12 PROCEDURE — 80053 COMPREHEN METABOLIC PANEL: CPT | Mod: HCNC | Performed by: STUDENT IN AN ORGANIZED HEALTH CARE EDUCATION/TRAINING PROGRAM

## 2025-03-12 PROCEDURE — 96376 TX/PRO/DX INJ SAME DRUG ADON: CPT | Mod: HCNC

## 2025-03-12 RX ORDER — ISOSORBIDE MONONITRATE 120 MG/1
120 TABLET, EXTENDED RELEASE ORAL DAILY
Qty: 90 TABLET | Refills: 1 | Status: SHIPPED | OUTPATIENT
Start: 2025-03-12

## 2025-03-12 RX ORDER — NITROGLYCERIN 0.4 MG/1
0.4 TABLET SUBLINGUAL EVERY 5 MIN PRN
Qty: 25 TABLET | Refills: 1 | Status: SHIPPED | OUTPATIENT
Start: 2025-03-12

## 2025-03-12 RX ORDER — BENZONATATE 100 MG/1
100 CAPSULE ORAL 3 TIMES DAILY PRN
Qty: 30 CAPSULE | Refills: 0 | Status: SHIPPED | OUTPATIENT
Start: 2025-03-12 | End: 2025-03-22

## 2025-03-12 RX ORDER — CEFDINIR 300 MG/1
300 CAPSULE ORAL
Qty: 2 EACH | Refills: 0 | Status: SHIPPED | OUTPATIENT
Start: 2025-03-12 | End: 2025-03-12

## 2025-03-12 RX ORDER — DOXYCYCLINE HYCLATE 100 MG
100 TABLET ORAL EVERY 12 HOURS
Qty: 8 TABLET | Refills: 0 | Status: SHIPPED | OUTPATIENT
Start: 2025-03-12 | End: 2025-03-18 | Stop reason: HOSPADM

## 2025-03-12 RX ORDER — CEFDINIR 300 MG/1
300 CAPSULE ORAL
Qty: 2 EACH | Refills: 0 | Status: SHIPPED | OUTPATIENT
Start: 2025-03-12 | End: 2025-03-18 | Stop reason: HOSPADM

## 2025-03-12 RX ORDER — SEVELAMER CARBONATE 800 MG/1
1600 TABLET, FILM COATED ORAL
Qty: 180 TABLET | Refills: 11 | Status: SHIPPED | OUTPATIENT
Start: 2025-03-12 | End: 2026-03-11

## 2025-03-12 RX ADMIN — SEVELAMER CARBONATE 1600 MG: 800 TABLET, FILM COATED ORAL at 12:03

## 2025-03-12 RX ADMIN — ATORVASTATIN CALCIUM 80 MG: 40 TABLET, FILM COATED ORAL at 12:03

## 2025-03-12 RX ADMIN — RANOLAZINE 500 MG: 500 TABLET, EXTENDED RELEASE ORAL at 12:03

## 2025-03-12 RX ADMIN — NIFEDIPINE 60 MG: 30 TABLET, FILM COATED, EXTENDED RELEASE ORAL at 12:03

## 2025-03-12 RX ADMIN — ISOSORBIDE MONONITRATE 120 MG: 60 TABLET, EXTENDED RELEASE ORAL at 12:03

## 2025-03-12 RX ADMIN — DOXYCYCLINE HYCLATE 100 MG: 100 TABLET, COATED ORAL at 12:03

## 2025-03-12 RX ADMIN — CARVEDILOL 25 MG: 12.5 TABLET, FILM COATED ORAL at 12:03

## 2025-03-12 RX ADMIN — CEFTRIAXONE 1 G: 1 INJECTION, POWDER, FOR SOLUTION INTRAMUSCULAR; INTRAVENOUS at 02:03

## 2025-03-12 RX ADMIN — SODIUM BICARBONATE 650 MG TABLET 650 MG: at 12:03

## 2025-03-12 RX ADMIN — EZETIMIBE 10 MG: 10 TABLET ORAL at 12:03

## 2025-03-12 RX ADMIN — EPOETIN ALFA-EPBX 5000 UNITS: 10000 INJECTION, SOLUTION INTRAVENOUS; SUBCUTANEOUS at 11:03

## 2025-03-12 RX ADMIN — HYDRALAZINE HYDROCHLORIDE 25 MG: 25 TABLET ORAL at 05:03

## 2025-03-12 NOTE — DISCHARGE INSTRUCTIONS
You were treated for Pneumonia and Volume overload   Please complete your antibiotics   Please continue dialysis as scheduled

## 2025-03-12 NOTE — PLAN OF CARE
[facility] Dept  Discharge Final Note    Primary Care Provider: Mireya Larose MD    Expected Discharge Date: 3/12/2025    Pt d/c home with no needs. Pt family to provide transportation home.     Final Discharge Note (most recent)       Final Note - 03/12/25 1348          Final Note    Assessment Type Final Discharge Note (P)      Anticipated Discharge Disposition Home or Self Care (P)         Post-Acute Status    Post-Acute Authorization Other (P)      Other Status No Post-Acute Service Needs (P)      Discharge Delays None known at this time (P)                      Important Message from Medicare             Contact Info       Mireya Larose MD   Specialty: Internal Medicine   Relationship: PCP - General    [address] [place] [address]   Phone: [phone]       Next Steps: Follow up in 1 week(s)          MIRTA Iyer, CSW.   Case Management  [facility]  Email: [email or web address]

## 2025-03-12 NOTE — PROGRESS NOTES
AVS reviewed with pt and family at bedside. All questions answered. PIV discontinued. Discharge pending medication delivery.

## 2025-03-12 NOTE — ASSESSMENT & PLAN NOTE
Creatine stable for now. BMP reviewed- noted Estimated Creatinine Clearance: 8.7 mL/min (A) (based on SCr of 9.8 mg/dL (H)). according to latest data. Based on current GFR, CKD stage is stage 5 - GFR < 15.  Monitor UOP and serial BMP and adjust therapy as needed. Renally dose meds. Avoid nephrotoxic medications and procedures.

## 2025-03-12 NOTE — DISCHARGE SUMMARY
[facility]  Hospital Medicine  Discharge Summary      Patient Name: Haroldo Dickinson  MRN: [id number]  SADIE: [id number]  Patient Class: IP- Inpatient  Admission Date: 3/10/2025  Hospital Length of Stay: 1 days  Discharge Date and Time:  03/12/2025 12:23 PM  Attending Physician: Montse Elena MD   Discharging Provider: Montse Elena MD  Primary Care Provider: Mireya Larose MD  Hospital Medicine Team: [facility] Montse Elena MD  Primary Care Team: [facility]    HPI:   48 yo M with extensive past medical history as below, notably CVA, MVCAD not amenable to revascularization by PCI or CABG, HFpEF, anuric ESRD HD MWF, RCC s/p nephrectomy who presents to the ED with chief complaint of SOB.  Pt says symptoms began 3 days ago.  He describes associated dry cough, congestion, dyspnea on minimal exertion, palpitations, lower extremity edema with pain, stable orthopnea, and stable chest pain.  Last HD 3/7, pt missed 3/10 d/t acute illness.  He denies headache, change to vision, fevers, chills, abdominal pain, nausea, vomiting, diarrhea, constipation, blood in stool, or numbness or tingling in extremities.    In the ED, D-dimer was elevated and U/S BLE negative for DVT, CTA chest ordered with no evidence of PE but revealed bilateral infiltrates concerning for infection or edema.  Hgb 6.2, 1 unit PRBCs transfused.  Nephrology consulted and plan for dialysis    * No surgery found *      Hospital Course:   Continued on IV abx for CAP on the floor and started on urgent dialysis with significant improvement. Attempted to wean O2 but with desaturation was placed back on it. H&H monitored closely and remained stable after 1 unit pRBC. Repeat dialysis session 3/12 which is with his schedule. Weaned to RA. HE was discharged home with close follow up and antibiotics. Met criteria for acute care to continue to follow H&H and other symptoms and he was agreeable to this. Referral placed.     Pt deemed  appropriate for discharge; seen and examined prior to departure. Plan discussed with pt, who was agreeable and amenable; medications were discussed and reviewed, outpatient follow-up scheduled, ER precautions were given, all questions were answered to the pt's satisfaction, and he was subsequently discharged.       Goals of Care Treatment Preferences:  Code Status: Full Code    Health care agent: Ariadne Dickinson (sister)  Health care agent number: [phone]          What is most important right now is to focus on extending life as long as possible, even it it means sacrificing quality, curative/life-prolongation (regardless of treatment burdens).  Accordingly, we have decided that the best plan to meet the patient's goals includes continuing with treatment.      SDOH Screening:  The patient was screened for utility difficulties, food insecurity, transport difficulties, housing insecurity, and interpersonal safety and there were no concerns identified this admission.     Consults:   Consults (From admission, onward)          Status Ordering Provider     Inpatient consult to Nephrology  Once        Provider:  (Not yet assigned)    Completed SHERICE VALDEZ            Assessment & Plan  Acute hypoxemic respiratory failure  Multifactorial with volume overload in the setting of ESRD and missed dialysis as well as possible pneumonia, see below.  No evidence of PE on CTA  Titrate O2 for sat > 90%  Volume management with HD per Nephrology  Continue antibiotics as below  Multifocal pneumonia  Afebrile with no leukocytosis but unable to rule out PNA based on hypoxia, bilateral rales, CTA chest results and cough.  Procalcitonin unreliable in ESRD  Sputum culture pending  MRSA screen and RIP pending  Symptomatic management  Continue ceftriaxone and doxycycline ()  ESRD (end stage renal disease)  Creatine stable for now. BMP reviewed- noted Estimated Creatinine Clearance: 8.7 mL/min (A) (based on SCr of 9.8 mg/dL (H)).  "according to latest data. Based on current GFR, CKD stage is stage 5 - GFR < 15.  Monitor UOP and serial BMP and adjust therapy as needed. Renally dose meds. Avoid nephrotoxic medications and procedures.  Anemia in end-stage renal disease  No reports of active bleeding  S/p 1 unit PRBCs in the ED  EPO per nephrology  Multivessel coronary artery disease  Per [facility] report 02/2025:  "Extensive multivessel coronary artery disease which is not amenable to revascularization (either PCI or bypass surgery - transmural scar in distal LAD/left circumflex/PLB noted on PET scan done in October, 2024) would recommend maximal medical therapy"  Continue home medication regimen as BP will allow  Acute on chronic heart failure with preserved ejection fraction (HFpEF)  Volume management with HD  Continue home GDMT regimen  Elevated BNP and troponin in the setting of ESRD  Strict I/Os, 1.5L fluid restriction, daily weights  Will continue to monitor on tele   Final Active Diagnoses:    Diagnosis Date Noted POA    PRINCIPAL PROBLEM:  Acute hypoxemic respiratory failure [J96.01] 09/02/2024 Yes    Acute on chronic heart failure with preserved ejection fraction (HFpEF) [I50.33] 03/11/2025 Yes    Multivessel coronary artery disease [I25.10] 10/29/2024 Yes    Anemia in end-stage renal disease [N18.6, D63.1] 06/14/2022 Yes    ESRD (end stage renal disease) [N18.6] 05/26/2020 Yes    Multifocal pneumonia [J18.9] 12/16/2013 Yes      Problems Resolved During this Admission:       Discharged Condition: good    Disposition: Home or Self Care    Follow Up:   Follow-up Information       Mireya Larose MD Follow up in 1 week(s).    Specialty: Internal Medicine  Contact information:  [address] [place] [id number] [phone]                           Patient Instructions:      Ambulatory referral/consult to Acute Care at Home   Standing Status: Future   Referral Priority: Routine Referral Type: Consultation   Referred to Provider: SILVIA" PROVIDER Requested Specialty: Internal Medicine   Number of Visits Requested: 1     Diet renal     Notify your health care provider if you experience any of the following:  temperature >100.4     Notify your health care provider if you experience any of the following:  persistent nausea and vomiting or diarrhea     Notify your health care provider if you experience any of the following:  severe uncontrolled pain     Notify your health care provider if you experience any of the following:  difficulty breathing or increased cough     Notify your health care provider if you experience any of the following:  severe persistent headache     Notify your health care provider if you experience any of the following:  worsening rash     Notify your health care provider if you experience any of the following:  persistent dizziness, light-headedness, or visual disturbances     Notify your health care provider if you experience any of the following:  increased confusion or weakness     Activity as tolerated       Significant Diagnostic Studies: N/A    Pending Diagnostic Studies:       None           Medications:  Reconciled Home Medications:      Medication List        START taking these medications      benzonatate 100 MG capsule  Commonly known as: TESSALON  Take 1 capsule (100 mg total) by mouth 3 (three) times daily as needed for Cough.     cefdinir 300 MG capsule  Commonly known as: OMNICEF  Take 1 capsule (300 mg total) by mouth every 48 hours. Take after dialysis for 4 days     doxycycline 100 MG tablet  Commonly known as: VIBRA-TABS  Take 1 tablet (100 mg total) by mouth every 12 (twelve) hours. for 4 days            CONTINUE taking these medications      acetaminophen 500 MG tablet  Commonly known as: TYLENOL  Take 1,000 mg by mouth as needed for Pain.     aspirin 81 MG EC tablet  Commonly known as: ECOTRIN  Take 1 tablet (81 mg total) by mouth once daily.     atorvastatin 80 MG tablet  Commonly known as: LIPITOR  Take 1  tablet (80 mg total) by mouth once daily.     carvediloL 25 MG tablet  Commonly known as: COREG  Take 1 tablet (25 mg total) by mouth 2 (two) times daily with meals.     clopidogreL 75 mg tablet  Commonly known as: PLAVIX  Take 1 tablet (75 mg total) by mouth once daily.     ezetimibe 10 mg tablet  Commonly known as: ZETIA  Take 1 tablet (10 mg total) by mouth once daily.     hydrALAZINE 25 MG tablet  Commonly known as: APRESOLINE  Take 1 tablet (25 mg total) by mouth every 8 (eight) hours.     isosorbide mononitrate 120 MG 24 hr tablet  Commonly known as: IMDUR  Take 1 tablet (120 mg total) by mouth once daily.     NIFEdipine 60 MG (OSM) 24 hr tablet  Commonly known as: PROCARDIA-XL  Take 1 tablet (60 mg total) by mouth 2 (two) times a day.     nitroGLYCERIN 0.4 MG SL tablet  Commonly known as: NITROSTAT  Place 1 tablet (0.4 mg total) under the tongue every 5 (five) minutes as needed for Chest pain.     ranolazine 500 MG Tb12  Commonly known as: RANEXA  Take 1 tablet (500 mg total) by mouth Daily.     sevelamer carbonate 800 mg Tab  Commonly known as: RENVELA  Take 2 tablets (1,600 mg total) by mouth 3 (three) times daily with meals.              Indwelling Lines/Drains at time of discharge:   Lines/Drains/Airways       Drain  Duration                  Hemodialysis AV Fistula Right forearm -- days                    Time spent on the discharge of patient: 35 minutes         Montse Elena MD  Department of Hospital Medicine  [facility] - Emergency Dept

## 2025-03-12 NOTE — PLAN OF CARE
WILIAN went over [facility] with pt making sure he is still agreeable to program. WILIAN provided pt with [facility] Flyer. Pt is agreeable to plan. WILIAN notified [facility] liaison Hina.     MIRTA Iyer, CSW.   Case Management  [facility]  Email: [email or web address]

## 2025-03-12 NOTE — NURSING
3.5 hour dialysis complete.  Blood returned.  Needles removed from LFA fistula.  Pressure held x 5 minutes.  Hemostasis achieved.  +thrill +bruit    Net UF 1.5L.     Epo given.    Transported from [facility]I to room ED05 via w/c by transporter.

## 2025-03-12 NOTE — PROGRESS NOTES
[facility] ST[facility]FF HEMODIALYSIS NOTE     Patient currently on hemodialysis for removal of uremic toxins and volume.     Patient seen and evaluated on hemodialysis, tolerating treatment, see HD flowsheet for vitals and assessments.    Labs have been reviewed and the dialysate bath has been adjusted.       Assessment/Plan:    ESRD  -Patient seen on HD, tolerating treatment well, w/o complaints   -UF goal of 1.5-2L.  -EDW 70.5 kg pre HD 68.9. C/o SOB. Chest x ray with concern for edema   -Renal diet, if not NPO   -Strict I/O's and daily weights  -Daily renal function panels  -Keep MAP >65 while on HD   -Hgb goal 10-11, epo with HD   -Will continue to follow while inpatient     Gianna Watkins DNP-FNP, C  Nephrology  Pager: [phone]

## 2025-03-12 NOTE — ASSESSMENT & PLAN NOTE
"Per [facility] report 02/2025:  "Extensive multivessel coronary artery disease which is not amenable to revascularization (either PCI or bypass surgery - transmural scar in distal LAD/left circumflex/PLB noted on PET scan done in October, 2024) would recommend maximal medical therapy"  Continue home medication regimen as BP will allow  "

## 2025-03-17 ENCOUNTER — PATIENT OUTREACH (OUTPATIENT)
Dept: ADMINISTRATIVE | Facility: OTHER | Age: 50
End: 2025-03-17
Payer: MEDICARE

## 2025-03-17 ENCOUNTER — HOSPITAL ENCOUNTER (OUTPATIENT)
Facility: HOSPITAL | Age: 50
Discharge: HOME | End: 2025-03-18
Attending: EMERGENCY MEDICINE | Admitting: HOSPITALIST
Payer: MEDICARE

## 2025-03-17 DIAGNOSIS — N18.6 ESRD (END STAGE RENAL DISEASE) ON DIALYSIS: ICD-10-CM

## 2025-03-17 DIAGNOSIS — D63.1 ANEMIA OF CHRONIC RENAL FAILURE, UNSPECIFIED CKD STAGE: ICD-10-CM

## 2025-03-17 DIAGNOSIS — J81.0 ACUTE PULMONARY EDEMA: Primary | ICD-10-CM

## 2025-03-17 DIAGNOSIS — D72.829 LEUKOCYTOSIS, UNSPECIFIED TYPE: ICD-10-CM

## 2025-03-17 DIAGNOSIS — R06.02 SHORTNESS OF BREATH: ICD-10-CM

## 2025-03-17 DIAGNOSIS — Z86.79 HISTORY OF CORONARY ARTERY DISEASE: ICD-10-CM

## 2025-03-17 DIAGNOSIS — Z99.2 ESRD (END STAGE RENAL DISEASE) ON DIALYSIS: ICD-10-CM

## 2025-03-17 DIAGNOSIS — R79.89 ELEVATED TROPONIN: ICD-10-CM

## 2025-03-17 DIAGNOSIS — J96.01 ACUTE HYPOXEMIC RESPIRATORY FAILURE: ICD-10-CM

## 2025-03-17 DIAGNOSIS — R06.02 SOB (SHORTNESS OF BREATH): ICD-10-CM

## 2025-03-17 DIAGNOSIS — N18.9 ANEMIA OF CHRONIC RENAL FAILURE, UNSPECIFIED CKD STAGE: ICD-10-CM

## 2025-03-17 PROBLEM — E11.9 T2DM (TYPE 2 DIABETES MELLITUS): Status: ACTIVE | Noted: 2025-03-17

## 2025-03-17 PROBLEM — E11.22 TYPE 2 DIABETES MELLITUS WITH CHRONIC KIDNEY DISEASE ON CHRONIC DIALYSIS, WITHOUT LONG-TERM CURRENT USE OF INSULIN: Status: RESOLVED | Noted: 2024-03-10 | Resolved: 2025-03-17

## 2025-03-17 PROBLEM — I25.10 TRIPLE VESSEL CORONARY ARTERY DISEASE: Status: RESOLVED | Noted: 2024-09-03 | Resolved: 2025-03-17

## 2025-03-17 LAB
ADENOVIRUS: NOT DETECTED
ALBUMIN SERPL BCP-MCNC: 2.5 G/DL (ref 3.5–5.2)
ALP SERPL-CCNC: 115 U/L (ref 40–150)
ALT SERPL W/O P-5'-P-CCNC: 99 U/L (ref 10–44)
ANION GAP SERPL CALC-SCNC: 18 MMOL/L (ref 8–16)
AST SERPL-CCNC: 89 U/L (ref 10–40)
BASOPHILS # BLD AUTO: 0.11 K/UL (ref 0–0.2)
BASOPHILS NFR BLD: 0.7 % (ref 0–1.9)
BILIRUB SERPL-MCNC: 1.1 MG/DL (ref 0.1–1)
BNP SERPL-MCNC: >4900 PG/ML (ref 0–99)
BORDETELLA PARAPERTUSSIS (IS1001): NOT DETECTED
BORDETELLA PERTUSSIS (PTXP): NOT DETECTED
BUN SERPL-MCNC: 103 MG/DL (ref 6–20)
CALCIUM SERPL-MCNC: 9.6 MG/DL (ref 8.7–10.5)
CHLAMYDIA PNEUMONIAE: NOT DETECTED
CHLORIDE SERPL-SCNC: 104 MMOL/L (ref 95–110)
CO2 SERPL-SCNC: 18 MMOL/L (ref 23–29)
CORONAVIRUS 229E, COMMON COLD VIRUS: NOT DETECTED
CORONAVIRUS HKU1, COMMON COLD VIRUS: NOT DETECTED
CORONAVIRUS NL63, COMMON COLD VIRUS: NOT DETECTED
CORONAVIRUS OC43, COMMON COLD VIRUS: NOT DETECTED
CREAT SERPL-MCNC: 10.9 MG/DL (ref 0.5–1.4)
CTP QC/QA: YES
CTP QC/QA: YES
DIFFERENTIAL METHOD BLD: ABNORMAL
EOSINOPHIL # BLD AUTO: 0.4 K/UL (ref 0–0.5)
EOSINOPHIL NFR BLD: 2.5 % (ref 0–8)
ERYTHROCYTE [DISTWIDTH] IN BLOOD BY AUTOMATED COUNT: 16.8 % (ref 11.5–14.5)
EST. GFR  (NO RACE VARIABLE): 5 ML/MIN/1.73 M^2
FLUBV RNA NPH QL NAA+NON-PROBE: NOT DETECTED
GLUCOSE SERPL-MCNC: 194 MG/DL (ref 70–110)
HCT VFR BLD AUTO: 23.5 % (ref 40–54)
HGB BLD-MCNC: 7.5 G/DL (ref 14–18)
HPIV1 RNA NPH QL NAA+NON-PROBE: NOT DETECTED
HPIV2 RNA NPH QL NAA+NON-PROBE: NOT DETECTED
HPIV3 RNA NPH QL NAA+NON-PROBE: NOT DETECTED
HPIV4 RNA NPH QL NAA+NON-PROBE: NOT DETECTED
HUMAN METAPNEUMOVIRUS: NOT DETECTED
IMM GRANULOCYTES # BLD AUTO: 0.1 K/UL (ref 0–0.04)
IMM GRANULOCYTES NFR BLD AUTO: 0.6 % (ref 0–0.5)
INFLUENZA A: NOT DETECTED
INR PPP: 1.2 (ref 0.8–1.2)
LACTATE SERPL-SCNC: 1.4 MMOL/L (ref 0.5–2.2)
LYMPHOCYTES # BLD AUTO: 1.5 K/UL (ref 1–4.8)
LYMPHOCYTES NFR BLD: 8.9 % (ref 18–48)
MAGNESIUM SERPL-MCNC: 2.1 MG/DL (ref 1.6–2.6)
MCH RBC QN AUTO: 28.8 PG (ref 27–31)
MCHC RBC AUTO-ENTMCNC: 31.9 G/DL (ref 32–36)
MCV RBC AUTO: 90 FL (ref 82–98)
MONOCYTES # BLD AUTO: 0.8 K/UL (ref 0.3–1)
MONOCYTES NFR BLD: 4.8 % (ref 4–15)
MYCOPLASMA PNEUMONIAE: NOT DETECTED
NEUTROPHILS # BLD AUTO: 13.5 K/UL (ref 1.8–7.7)
NEUTROPHILS NFR BLD: 82.5 % (ref 38–73)
NRBC BLD-RTO: 0 /100 WBC
PHOSPHATE SERPL-MCNC: 6 MG/DL (ref 2.7–4.5)
PLATELET # BLD AUTO: 431 K/UL (ref 150–450)
PMV BLD AUTO: 9.7 FL (ref 9.2–12.9)
POTASSIUM SERPL-SCNC: 5.8 MMOL/L (ref 3.5–5.1)
PROCALCITONIN SERPL IA-MCNC: 1.76 NG/ML
PROT SERPL-MCNC: 8 G/DL (ref 6–8.4)
PROTHROMBIN TIME: 13.1 SEC (ref 9–12.5)
RBC # BLD AUTO: 2.6 M/UL (ref 4.6–6.2)
RESPIRATORY INFECTION PANEL SOURCE: NORMAL
RSV RNA NPH QL NAA+NON-PROBE: NOT DETECTED
RV+EV RNA NPH QL NAA+NON-PROBE: NOT DETECTED
SARS CORONAVIRUS 2 ANTIGEN: NEGATIVE
SARS-COV-2 RDRP RESP QL NAA+PROBE: NEGATIVE
SARS-COV-2 RNA RESP QL NAA+PROBE: NOT DETECTED
SODIUM SERPL-SCNC: 140 MMOL/L (ref 136–145)
TROPONIN I SERPL DL<=0.01 NG/ML-MCNC: 2.29 NG/ML (ref 0–0.03)
TROPONIN I SERPL DL<=0.01 NG/ML-MCNC: 2.38 NG/ML (ref 0–0.03)
TROPONIN I SERPL DL<=0.01 NG/ML-MCNC: 2.71 NG/ML (ref 0–0.03)
WBC # BLD AUTO: 16.35 K/UL (ref 3.9–12.7)

## 2025-03-17 PROCEDURE — 93010 ELECTROCARDIOGRAM REPORT: CPT | Mod: HCNC,,, | Performed by: INTERNAL MEDICINE

## 2025-03-17 PROCEDURE — 87040 BLOOD CULTURE FOR BACTERIA: CPT | Mod: HCNC

## 2025-03-17 PROCEDURE — 96374 THER/PROPH/DIAG INJ IV PUSH: CPT | Mod: 59,HCNC

## 2025-03-17 PROCEDURE — 80100014 HC HEMODIALYSIS 1:1: Mod: HCNC

## 2025-03-17 PROCEDURE — 84484 ASSAY OF TROPONIN QUANT: CPT | Mod: 91,HCNC

## 2025-03-17 PROCEDURE — 93010 ELECTROCARDIOGRAM REPORT: CPT | Mod: HCNC,76,, | Performed by: INTERNAL MEDICINE

## 2025-03-17 PROCEDURE — 96366 THER/PROPH/DIAG IV INF ADDON: CPT | Mod: HCNC

## 2025-03-17 PROCEDURE — 99285 EMERGENCY DEPT VISIT HI MDM: CPT | Mod: 25,HCNC

## 2025-03-17 PROCEDURE — G0378 HOSPITAL OBSERVATION PER HR: HCPCS | Mod: HCNC

## 2025-03-17 PROCEDURE — 63600175 PHARM REV CODE 636 W HCPCS: Mod: HCNC

## 2025-03-17 PROCEDURE — 85610 PROTHROMBIN TIME: CPT | Mod: HCNC | Performed by: EMERGENCY MEDICINE

## 2025-03-17 PROCEDURE — 87635 SARS-COV-2 COVID-19 AMP PRB: CPT | Mod: HCNC | Performed by: HOSPITALIST

## 2025-03-17 PROCEDURE — 83605 ASSAY OF LACTIC ACID: CPT | Mod: HCNC | Performed by: EMERGENCY MEDICINE

## 2025-03-17 PROCEDURE — 83880 ASSAY OF NATRIURETIC PEPTIDE: CPT | Mod: HCNC | Performed by: EMERGENCY MEDICINE

## 2025-03-17 PROCEDURE — 63600175 PHARM REV CODE 636 W HCPCS: Mod: JZ,TB,HCNC | Performed by: PHYSICIAN ASSISTANT

## 2025-03-17 PROCEDURE — 96372 THER/PROPH/DIAG INJ SC/IM: CPT

## 2025-03-17 PROCEDURE — 83735 ASSAY OF MAGNESIUM: CPT | Mod: HCNC | Performed by: EMERGENCY MEDICINE

## 2025-03-17 PROCEDURE — 93005 ELECTROCARDIOGRAM TRACING: CPT | Mod: HCNC

## 2025-03-17 PROCEDURE — 84100 ASSAY OF PHOSPHORUS: CPT | Mod: HCNC | Performed by: EMERGENCY MEDICINE

## 2025-03-17 PROCEDURE — 84484 ASSAY OF TROPONIN QUANT: CPT | Mod: HCNC | Performed by: EMERGENCY MEDICINE

## 2025-03-17 PROCEDURE — 84145 PROCALCITONIN (PCT): CPT | Mod: HCNC | Performed by: HOSPITALIST

## 2025-03-17 PROCEDURE — 25000003 PHARM REV CODE 250: Mod: HCNC

## 2025-03-17 PROCEDURE — 85025 COMPLETE CBC W/AUTO DIFF WBC: CPT | Mod: HCNC | Performed by: EMERGENCY MEDICINE

## 2025-03-17 PROCEDURE — G0257 UNSCHED DIALYSIS ESRD PT HOS: HCPCS

## 2025-03-17 PROCEDURE — 0202U NFCT DS 22 TRGT SARS-COV-2: CPT | Mod: HCNC

## 2025-03-17 PROCEDURE — 96365 THER/PROPH/DIAG IV INF INIT: CPT | Mod: HCNC

## 2025-03-17 PROCEDURE — 80053 COMPREHEN METABOLIC PANEL: CPT | Mod: HCNC | Performed by: EMERGENCY MEDICINE

## 2025-03-17 RX ORDER — IBUPROFEN 200 MG
24 TABLET ORAL
Status: DISCONTINUED | OUTPATIENT
Start: 2025-03-17 | End: 2025-03-18 | Stop reason: HOSPADM

## 2025-03-17 RX ORDER — EZETIMIBE 10 MG/1
10 TABLET ORAL DAILY
Status: DISCONTINUED | OUTPATIENT
Start: 2025-03-17 | End: 2025-03-18 | Stop reason: HOSPADM

## 2025-03-17 RX ORDER — SIMETHICONE 80 MG
1 TABLET,CHEWABLE ORAL 3 TIMES DAILY PRN
Status: DISCONTINUED | OUTPATIENT
Start: 2025-03-17 | End: 2025-03-18 | Stop reason: HOSPADM

## 2025-03-17 RX ORDER — RANOLAZINE 500 MG/1
500 TABLET, EXTENDED RELEASE ORAL DAILY
Status: DISCONTINUED | OUTPATIENT
Start: 2025-03-17 | End: 2025-03-18 | Stop reason: HOSPADM

## 2025-03-17 RX ORDER — ACETAMINOPHEN 325 MG/1
325 TABLET ORAL EVERY 6 HOURS PRN
Status: DISCONTINUED | OUTPATIENT
Start: 2025-03-17 | End: 2025-03-18 | Stop reason: HOSPADM

## 2025-03-17 RX ORDER — SEVELAMER CARBONATE 800 MG/1
1600 TABLET, FILM COATED ORAL
Status: DISCONTINUED | OUTPATIENT
Start: 2025-03-17 | End: 2025-03-18 | Stop reason: HOSPADM

## 2025-03-17 RX ORDER — IBUPROFEN 200 MG
16 TABLET ORAL
Status: DISCONTINUED | OUTPATIENT
Start: 2025-03-17 | End: 2025-03-18 | Stop reason: HOSPADM

## 2025-03-17 RX ORDER — CARVEDILOL 12.5 MG/1
25 TABLET ORAL 2 TIMES DAILY WITH MEALS
Status: DISCONTINUED | OUTPATIENT
Start: 2025-03-18 | End: 2025-03-18 | Stop reason: HOSPADM

## 2025-03-17 RX ORDER — HEPARIN SODIUM 5000 [USP'U]/ML
5000 INJECTION, SOLUTION INTRAVENOUS; SUBCUTANEOUS EVERY 8 HOURS
Status: DISCONTINUED | OUTPATIENT
Start: 2025-03-17 | End: 2025-03-18 | Stop reason: HOSPADM

## 2025-03-17 RX ORDER — ASPIRIN 81 MG/1
81 TABLET ORAL DAILY
Status: DISCONTINUED | OUTPATIENT
Start: 2025-03-17 | End: 2025-03-18 | Stop reason: HOSPADM

## 2025-03-17 RX ORDER — INSULIN ASPART 100 [IU]/ML
0-5 INJECTION, SOLUTION INTRAVENOUS; SUBCUTANEOUS
Status: DISCONTINUED | OUTPATIENT
Start: 2025-03-17 | End: 2025-03-18 | Stop reason: HOSPADM

## 2025-03-17 RX ORDER — BENZONATATE 100 MG/1
100 CAPSULE ORAL 3 TIMES DAILY PRN
Status: DISCONTINUED | OUTPATIENT
Start: 2025-03-17 | End: 2025-03-17

## 2025-03-17 RX ORDER — TALC
6 POWDER (GRAM) TOPICAL NIGHTLY PRN
Status: DISCONTINUED | OUTPATIENT
Start: 2025-03-17 | End: 2025-03-18 | Stop reason: HOSPADM

## 2025-03-17 RX ORDER — GUAIFENESIN 100 MG/5ML
200 LIQUID ORAL EVERY 4 HOURS PRN
Status: DISCONTINUED | OUTPATIENT
Start: 2025-03-17 | End: 2025-03-18 | Stop reason: HOSPADM

## 2025-03-17 RX ORDER — NIFEDIPINE 30 MG/1
60 TABLET, EXTENDED RELEASE ORAL 2 TIMES DAILY
Status: DISCONTINUED | OUTPATIENT
Start: 2025-03-18 | End: 2025-03-18 | Stop reason: HOSPADM

## 2025-03-17 RX ORDER — CALCIUM CARBONATE 200(500)MG
500 TABLET,CHEWABLE ORAL 3 TIMES DAILY PRN
Status: DISCONTINUED | OUTPATIENT
Start: 2025-03-17 | End: 2025-03-18 | Stop reason: HOSPADM

## 2025-03-17 RX ORDER — SEVELAMER CARBONATE 800 MG/1
1600 TABLET, FILM COATED ORAL
Status: DISCONTINUED | OUTPATIENT
Start: 2025-03-17 | End: 2025-03-17 | Stop reason: SDUPTHER

## 2025-03-17 RX ORDER — ONDANSETRON 4 MG/1
4 TABLET, ORALLY DISINTEGRATING ORAL EVERY 6 HOURS PRN
Status: DISCONTINUED | OUTPATIENT
Start: 2025-03-17 | End: 2025-03-18 | Stop reason: HOSPADM

## 2025-03-17 RX ORDER — CLOPIDOGREL BISULFATE 75 MG/1
75 TABLET ORAL DAILY
Status: DISCONTINUED | OUTPATIENT
Start: 2025-03-17 | End: 2025-03-18 | Stop reason: HOSPADM

## 2025-03-17 RX ORDER — POLYETHYLENE GLYCOL 3350 17 G/17G
17 POWDER, FOR SOLUTION ORAL DAILY PRN
Status: DISCONTINUED | OUTPATIENT
Start: 2025-03-17 | End: 2025-03-18 | Stop reason: HOSPADM

## 2025-03-17 RX ORDER — GLUCAGON 1 MG
1 KIT INJECTION
Status: DISCONTINUED | OUTPATIENT
Start: 2025-03-17 | End: 2025-03-18 | Stop reason: HOSPADM

## 2025-03-17 RX ORDER — FUROSEMIDE 10 MG/ML
60 INJECTION INTRAMUSCULAR; INTRAVENOUS EVERY 12 HOURS
Status: DISCONTINUED | OUTPATIENT
Start: 2025-03-17 | End: 2025-03-17

## 2025-03-17 RX ORDER — ISOSORBIDE MONONITRATE 30 MG/1
120 TABLET, EXTENDED RELEASE ORAL DAILY
Status: DISCONTINUED | OUTPATIENT
Start: 2025-03-18 | End: 2025-03-18 | Stop reason: HOSPADM

## 2025-03-17 RX ORDER — SODIUM CHLORIDE 0.9 % (FLUSH) 0.9 %
10 SYRINGE (ML) INJECTION
Status: DISCONTINUED | OUTPATIENT
Start: 2025-03-17 | End: 2025-03-18 | Stop reason: HOSPADM

## 2025-03-17 RX ORDER — DIPHENHYDRAMINE HCL 25 MG
25 CAPSULE ORAL EVERY 6 HOURS PRN
Status: DISCONTINUED | OUTPATIENT
Start: 2025-03-17 | End: 2025-03-18 | Stop reason: HOSPADM

## 2025-03-17 RX ADMIN — CLOPIDOGREL BISULFATE 75 MG: 75 TABLET ORAL at 07:03

## 2025-03-17 RX ADMIN — EZETIMIBE 10 MG: 10 TABLET ORAL at 08:03

## 2025-03-17 RX ADMIN — ACETAMINOPHEN 325 MG: 325 TABLET ORAL at 07:03

## 2025-03-17 RX ADMIN — PIPERACILLIN SODIUM AND TAZOBACTAM SODIUM 4.5 G: 4; .5 INJECTION, POWDER, FOR SOLUTION INTRAVENOUS at 07:03

## 2025-03-17 RX ADMIN — ASPIRIN 81 MG: 81 TABLET, COATED ORAL at 07:03

## 2025-03-17 RX ADMIN — HEPARIN SODIUM 5000 UNITS: 5000 INJECTION INTRAVENOUS; SUBCUTANEOUS at 10:03

## 2025-03-17 RX ADMIN — RANOLAZINE 500 MG: 500 TABLET, FILM COATED, EXTENDED RELEASE ORAL at 08:03

## 2025-03-17 RX ADMIN — HEPARIN SODIUM 5000 UNITS: 5000 INJECTION INTRAVENOUS; SUBCUTANEOUS at 01:03

## 2025-03-17 RX ADMIN — EPOETIN ALFA-EPBX 10000 UNITS: 10000 INJECTION, SOLUTION INTRAVENOUS; SUBCUTANEOUS at 03:03

## 2025-03-17 NOTE — ASSESSMENT & PLAN NOTE
Patient's blood pressure range in the last 24 hours was: BP  Min: 123/77  Max: 141/83.The patient's inpatient anti-hypertensive regimen is listed below:  Current Antihypertensives  carvediloL tablet 25 mg, 2 times daily with meals, Oral  isosorbide mononitrate 24 hr tablet 120 mg, Daily, Oral  NIFEdipine 24 hr tablet 60 mg, 2 times daily, Oral  ranolazine 12 hr tablet 500 mg, Daily, Oral    Plan  - BP is controlled, no changes needed to their regimen

## 2025-03-17 NOTE — H&P
[facility] Emergency Dept  [facility] Medicine  History & Physical    Patient Name: Haroldo Dickinson  MRN: [id number]  Patient Class: OP- Observation  Admission Date: 3/17/2025  Attending Physician: Makenzie Germain MD   Primary Care Provider: Mireya Larose MD         Patient information was obtained from patient and ER records.     Subjective:     Principal Problem:Acute hypoxemic respiratory failure    Chief Complaint:   Chief Complaint   Patient presents with    Shortness of Breath     Pt BIB EMS, c/o SOB, cough, and bilateral foot and leg swelling x 3 days. Pt reports unable to finish dialysis treatment on Friday due to hypotension. Pt is due for dialysis on today. Pt denies any CP, abd pain or n/v/d        HPI: 50 yo M with HTN, T2DM, prior CVA with left sided hemiparesis, multi-vessel CAD non-amenable to revascularization by PCI or CABG, HFpEF, anuric ESRD HD MWF, RCC s/p bilateral nephrectomy presented to the ED with chief complaint of SOB. Patient reports the symptoms started approximately 1 week ago after missing two consecutive HD appointments due to an acute illness. He presented to [facility] on 03/10 for evaluation and underwent urgent HD. While admitted, patient was also started on empiric therapy for CAP given clinical presentation and CT findings which could not rule-out an infectious process. He was discharged on Cefdinir + Doxycycline, though reports he stopped taking them after about two days as they weren't helping. He reports a persistent non-productive cough as well as congestion and rhinorrhea. He denies fever, chills, myalgias, n/v/d. Patient's last HD session was on Friday 3/14, however, had to be cut short d/t hypotension.      In ED: VSS, AF. CBC notable for leukocytosis 16k. H/H stable 7.5/23.5. CMP with hyperkalemia 5.8 and hyperphosphatemia 6.0.   Cr 10.9. BNP > 4900. Troponin 2.71, down from approx 40 one month ago. He was placed on 2L NC. Admitted to Hospital Medicine for HD and  treatment of PNA.     Past Medical History:   Diagnosis Date    CAD (coronary artery disease), native coronary artery 11/21/2019    Cataract     Diabetes mellitus     Diabetic retinopathy     Dialysis patient     DM type 2 causing renal disease, not at goal     ESRD (end stage renal disease) started dialysis 01/2014 06/05/2014    H/o acute coronary syndrome with high troponin 03/11/2024    History of colon polyps 02/10/2021    History of COVID-19 12/29/2022    Hyperparathyroidism, secondary renal 06/05/2014    Hypertension     Hypertensive emergency 11/27/2023    NSTEMI (non-ST elevated myocardial infarction) 12/21/2013    Organ transplant candidate 06/05/2014    Palliative care encounter 10/29/2024    Pleural effusion 06/07/2024    Pneumonia     Post PTCA 08/26/2020    RCA HILLARY 7-25-20    Renal cell carcinoma of right kidney     Renal hypertension     Stroke        Past Surgical History:   Procedure Laterality Date    ANGIOGRAM, CORONARY, WITH LEFT HEART CATHETERIZATION N/A 7/1/2021    Procedure: Angiogram, Coronary, with Left Heart Cath;  Surgeon: Miki Zendejas MD;  Location: [facility] CATH LAB;  Service: Cardiology;  Laterality: N/A;    ANGIOGRAM, CORONARY, WITH LEFT HEART CATHETERIZATION N/A 11/28/2023    Procedure: Angiogram, Coronary, with Left Heart Cath;  Surgeon: Noah Pendleton MD;  Location: [facility] CATH LAB;  Service: Cardiology;  Laterality: N/A;    COLONOSCOPY N/A 5/3/2017    Procedure: COLONOSCOPY;  Surgeon: Rufino Carpenter MD;  Location: [address]);  Service: Endoscopy;  Laterality: N/A;  pt states can only schedule on Wednesdays    COLONOSCOPY N/A 2/9/2021    Procedure: COLONOSCOPY;  Surgeon: Edil Wong MD;  Location: [facility] ENDO ([address] [place] [address]);  Service: Endoscopy;  Laterality: N/A;  Dialysis MWF/ labwork day of procedure  right arm aceess  per Dr. Colin pt can hold Plavix 5 days prior see note- sm  COVID test on 2/6/21 at [facility] - sm    COLONOSCOPY N/A 2/10/2021    Procedure: COLONOSCOPY;  Surgeon:  Robert Ayers MD;  Location: [facility] ENDO (4TH FLR);  Service: Endoscopy;  Laterality: N/A;  rescheduled due to poor bowel prep-BB  negative covid screening 2/6/21-BB  dialysis M-W-F-BB  okay to r/s for 2/9/21 and to hold Plavix per Dr. KIRAN Wong-BB  labs same day-BB    CORONARY ANGIOGRAPHY N/A 2/10/2025    Procedure: ANGIOGRAM, CORONARY ARTERY;  Surgeon: Ajith Hutchins MD;  Location: [facility] CATH LAB;  Service: Cardiology;  Laterality: N/A;    CORONARY STENT PLACEMENT N/A 7/25/2019    Procedure: INSERTION, STENT, CORONARY ARTERY;  Surgeon: Miki Zendejas MD;  Location: [facility] CATH LAB;  Service: Cardiology;  Laterality: N/A;    DIALYSIS FISTULA CREATION      FISTULOGRAM N/A 2/18/2019    Procedure: Fistulogram;  Surgeon: Yaneth De La Cruz MD;  Location: [facility] CATH LAB;  Service: Cardiology;  Laterality: N/A;    FISTULOGRAM Right 7/23/2019    Procedure: Fistulogram;  Surgeon: Oz Cordoba MD;  Location: [facility] CATH LAB;  Service: Cardiology;  Laterality: Right;    LAPAROSCOPIC ROBOT-ASSISTED SURGICAL REMOVAL OF KIDNEY USING DA JAIME XI Right 5/19/2022    Procedure: XI ROBOTIC NEPHRECTOMY;  Surgeon: Aidan Hendricks MD;  Location: [facility] 2ND FLR;  Service: Urology;  Laterality: Right;  3hrs    LAPAROSCOPIC ROBOT-ASSISTED SURGICAL REMOVAL OF KIDNEY USING DA JAIME XI Left 1/5/2023    Procedure: XI ROBOTIC NEPHRECTOMY;  Surgeon: Aidan Hendricks MD;  Location: [facility] 2ND FLR;  Service: Urology;  Laterality: Left;  4 hrs    LEFT HEART CATHETERIZATION Left 7/25/2019    Procedure: Left heart cath;  Surgeon: Miki Zendejas MD;  Location: [facility] CATH LAB;  Service: Cardiology;  Laterality: Left;    LEFT HEART CATHETERIZATION Left 2/10/2025    Procedure: Left heart cath;  Surgeon: Ajith Hutchins MD;  Location: [facility] CATH LAB;  Service: Cardiology;  Laterality: Left;    REPAIR  1/5/2023    Procedure: REPAIR; COLOTOMY;  Surgeon: Maikel Lamb MD;  Location: [facility] OR 2ND FLR;  Service: General;;     RETINAL LASER PROCEDURE Bilateral 2018 or 2017    Dr. Rothman    VASCULAR SURGERY         Review of patient's allergies indicates:   Allergen Reactions    No known allergies        Current Facility-Administered Medications on File Prior to Encounter   Medication    lidocaine (PF) 10 mg/ml (1%) injection 10 mg     Current Outpatient Medications on File Prior to Encounter   Medication Sig    aspirin (ECOTRIN) 81 MG EC tablet Take 1 tablet (81 mg total) by mouth once daily.    carvediloL (COREG) 25 MG tablet Take 1 tablet (25 mg total) by mouth 2 (two) times daily with meals.    clopidogreL (PLAVIX) 75 mg tablet Take 1 tablet (75 mg total) by mouth once daily.    ezetimibe (ZETIA) 10 mg tablet Take 1 tablet (10 mg total) by mouth once daily.    hydrALAZINE (APRESOLINE) 25 MG tablet Take 1 tablet (25 mg total) by mouth every 8 (eight) hours.    isosorbide mononitrate (IMDUR) 120 MG 24 hr tablet Take 1 tablet (120 mg total) by mouth once daily.    NIFEdipine (PROCARDIA-XL) 60 MG (OSM) 24 hr tablet Take 1 tablet (60 mg total) by mouth 2 (two) times a day. (Patient taking differently: Take 60 mg by mouth once daily.)    nitroGLYCERIN (NITROSTAT) 0.4 MG SL tablet Place 1 tablet (0.4 mg total) under the tongue every 5 (five) minutes as needed for Chest pain.    ranolazine (RANEXA) 500 MG Tb12 Take 1 tablet (500 mg total) by mouth Daily.    sevelamer carbonate (RENVELA) 800 mg Tab Take 2 tablets (1,600 mg total) by mouth 3 (three) times daily with meals.    atorvastatin (LIPITOR) 80 MG tablet Take 1 tablet (80 mg total) by mouth once daily. (Patient not taking: Reported on 3/17/2025)    benzonatate (TESSALON) 100 MG capsule Take 1 capsule (100 mg total) by mouth 3 (three) times daily as needed for Cough. (Patient not taking: Reported on 3/17/2025)    [] cefdinir (OMNICEF) 300 MG capsule Take 1 capsule (300 mg total) by mouth every 48 hours. Take after dialysis for 4 days (Patient not taking: Reported on 3/17/2025)     [] doxycycline (VIBRA-TABS) 100 MG tablet Take 1 tablet (100 mg total) by mouth every 12 (twelve) hours. for 4 days (Patient not taking: Reported on 3/17/2025)    [DISCONTINUED] acetaminophen (TYLENOL) 500 MG tablet Take 1,000 mg by mouth as needed for Pain.     Family History       Problem Relation (Age of Onset)    Cancer Maternal Grandfather    Cataracts Mother    Diabetes Mother    Heart disease Brother    Hypertension Mother, Father, Sister, Brother    Kidney disease Brother          Tobacco Use    Smoking status: Never    Smokeless tobacco: Never   Substance and Sexual Activity    Alcohol use: Not Currently     Comment: One drink a month    Drug use: No    Sexual activity: Yes     Partners: Female     Birth control/protection: None     Review of Systems   Constitutional:  Negative for activity change, appetite change, chills, fatigue and fever.   HENT:  Positive for congestion and rhinorrhea.    Respiratory:  Positive for cough (non-productive) and shortness of breath. Negative for wheezing.    Cardiovascular:  Positive for leg swelling. Negative for chest pain and palpitations.   Gastrointestinal:  Negative for abdominal pain, diarrhea, nausea and vomiting.   Neurological:  Negative for dizziness, syncope and light-headedness.     Objective:     Vital Signs (Most Recent):  Temp: 97.8 °F (36.6 °C) (25 0838)  Pulse: 87 (25 1300)  Resp: (!) 35 (25 1300)  BP: 127/75 (25 1300)  SpO2: (!) 92 % (25 1300) Vital Signs (24h Range):  Temp:  [97.8 °F (36.6 °C)] 97.8 °F (36.6 °C)  Pulse:  [87-92] 87  Resp:  [9-35] 35  SpO2:  [92 %-99 %] 92 %  BP: (123-141)/(75-94) 127/75     Weight: 72.5 kg (159 lb 14.4 oz)  Body mass index is 25.04 kg/m².     Physical Exam  Vitals and nursing note reviewed.   Constitutional:       Appearance: He is ill-appearing.   HENT:      Head: Normocephalic.      Mouth/Throat:      Pharynx: Oropharynx is clear.   Cardiovascular:      Rate and Rhythm: Normal rate  "and regular rhythm.      Arteriovenous access: Right arteriovenous access is present.     Comments: + RUE AVF, + thrill  Pulmonary:      Effort: No respiratory distress.      Breath sounds: Rales present. No wheezing.   Abdominal:      General: Bowel sounds are normal.      Palpations: Abdomen is soft.   Musculoskeletal:      Right lower leg: Edema present.      Left lower leg: No edema.   Skin:     General: Skin is warm and dry.   Neurological:      General: No focal deficit present.      Mental Status: He is alert. Mental status is at baseline.   Psychiatric:         Mood and Affect: Mood normal.         Behavior: Behavior normal.                Significant Labs: All pertinent labs within the past 24 hours have been reviewed.    Significant Imaging: I have reviewed all pertinent imaging results/findings within the past 24 hours.  Assessment/Plan:     * Acute hypoxemic respiratory failure  Patient with Hypoxic Respiratory failure which is Acute.  he is not on home oxygen. Supplemental oxygen was provided and noted-  2L NC    .   Signs/symptoms of respiratory failure include- respiratory distress. Contributing diagnoses includes - CHF, Pneumonia, and ESRD  Labs and images were reviewed. Patient Has not had a recent ABG. Will treat underlying causes and adjust management of respiratory failure as follows-     Multifactorial with volume overload in the setting of ESRD/CHF and shortened dialysis as well as possible pneumonia, see below.  Volume management with HD per Nephrology. Unable to initiate Lasix in setting of bilateral nephrectomy.   See plan for CHF below  See plan for multifocal pneumonia below      T2DM (type 2 diabetes mellitus)  Patient's FSGs are uncontrolled due to hyperglycemia on current medication regimen.  Last A1c reviewed-   Lab Results   Component Value Date    HGBA1C 5.1 01/08/2025     Most recent fingerstick glucose reviewed- No results for input(s): "POCTGLUCOSE" in the last 24 hours.  Current " correctional scale  Low  Maintain anti-hyperglycemic dose as follows-   Antihyperglycemics (From admission, onward)      Start     Stop Route Frequency Ordered    03/17/25 1258  insulin aspart U-100 pen 0-5 Units         -- SubQ Before meals & nightly PRN 03/17/25 1158          Hold Oral hypoglycemics while patient is in the hospital.    Acute on chronic heart failure with preserved ejection fraction (HFpEF)  Patient has Diastolic (HFpEF) heart failure that is Acute on chronic. On presentation their CHF was decompensated. Evidence of decompensated CHF on presentation includes: edema, orthopnea, dyspnea on exertion (OSEGUERA), and shortness of breath. The etiology of their decompensation is likely shortened HD treatment on 03/14. Most recent BNP and echo results are listed below.  Recent Labs     03/17/25  0909   BNP >4,900*     Latest ECHO  Results for orders placed during the hospital encounter of 02/09/25    Echo    Interpretation Summary    Left Ventricle: The left ventricle is mildly dilated. Mildly increased wall thickness. There is mild concentric hypertrophy. There is low normal systolic function with a visually estimated ejection fraction of 50 - 55%. Grade II diastolic dysfunction.    Right Ventricle: Normal right ventricular cavity size. Systolic function is normal.    Left Atrium: Left atrium is mildly dilated.    Aortic Valve: The aortic valve is a trileaflet valve. There is moderate aortic valve sclerosis.    Mitral Valve: There is mild to moderate regurgitation.    Tricuspid Valve: There is mild regurgitation.    Current Heart Failure Medications       Plan  - Monitor strict I&Os and daily weights.    - Place on telemetry  - Low sodium diet  - Place on fluid restriction of 1.5 L.   - Cardiology has not been consulted  - The patient's volume status is stable but not at their baseline as indicated by edema, orthopnea, dyspnea on exertion (OSEGUERA), and shortness of breath  - HD for volume management. Will not  "initiate Lasix as patient is s/p bilateral nephrectomy.         ACP (advance care planning)  Palliative care consulted.       Multivessel coronary artery disease  Per [facility] report 02/2025:  "Extensive multivessel coronary artery disease which is not amenable to revascularization (either PCI or bypass surgery - transmural scar in distal LAD/left circumflex/PLB noted on PET scan done in October, 2024) would recommend maximal medical therapy"  Denies chest pain at this time  Continue home medication regimen as BP will allow    PAD (peripheral artery disease)  Continue ASA, Lipitor, Plavix      Anemia in end-stage renal disease  Anemia is likely due to chronic disease due to ESRD. Most recent hemoglobin and hematocrit are listed below.  Recent Labs     03/17/25  0909   HGB 7.5*   HCT 23.5*     Plan  - Monitor serial CBC: Daily  - Transfuse PRBC if patient becomes hemodynamically unstable, symptomatic or H/H drops below 7/21.  - Patient has not received any PRBC transfusions to date  - Patient's anemia is currently stable    S/p nephrectomy  Hx of RCC with bilateral nephrectomy      Essential hypertension  Patient's blood pressure range in the last 24 hours was: BP  Min: 123/77  Max: 141/83.The patient's inpatient anti-hypertensive regimen is listed below:  Current Antihypertensives  carvediloL tablet 25 mg, 2 times daily with meals, Oral  isosorbide mononitrate 24 hr tablet 120 mg, Daily, Oral  NIFEdipine 24 hr tablet 60 mg, 2 times daily, Oral  ranolazine 12 hr tablet 500 mg, Daily, Oral    Plan  - BP is controlled, no changes needed to their regimen    ESRD on hemodialysis  Creatine stable for now. BMP reviewed- noted Estimated Creatinine Clearance: 7.8 mL/min (A) (based on SCr of 10.9 mg/dL (H)). according to latest data. Based on current GFR, CKD stage is end stage.  Monitor UOP and serial BMP and adjust therapy as needed. Renally dose meds. Avoid nephrotoxic medications and procedures.  Nephrology consulted. Plan for " HD today.     Hemiparesis affecting right side as late effect of cerebrovascular accident  Noted. No complaints of new focal deficits.      Multifocal pneumonia  Recently hospitalized at [facility] 03/10 - 03/12. CT Chest showed mild cardiomegaly with bilateral airspace disease with left upper and lower lobe consolidation, patchy infiltrate in the right and bilateral ground-glass infiltrates. Treated with Rocephin and Doxycycline for concerns of CAP. Was discharged on Doxycycline and Cefdinir.    Now with new-onset leukocytosis 16k. CXR Cardiomegaly with patchy interstitial and airspace opacities throughout both lungs, slightly increased in conspicuity from comparison CT 03/10/2025. Presentation more consistent with volume overload, however, will initiate treatment for PNA given leukocytosis and URI symptoms.    Procal ordered, however, often unreliable in ESRD.   RIP, Flu, COVID, sputum culture pending  Continue Zosyn for now. Deescalate as necessary pending cultures.   Supplemental O2 as needed          VTE Risk Mitigation (From admission, onward)           Ordered     heparin (porcine) injection 5,000 Units  Every 8 hours         03/17/25 1109     IP VTE HIGH RISK PATIENT  Once         03/17/25 1109     Place sequential compression device  Until discontinued         03/17/25 1109                         On 03/17/2025, patient should be placed in hospital observation services under my care in collaboration with Dr. Makenzie Germain.           Ortega Mason PA-C  Department of Hospital Medicine  [facility] [place] - Emergency Dept

## 2025-03-17 NOTE — ASSESSMENT & PLAN NOTE
Anemia is likely due to chronic disease due to ESRD. Most recent hemoglobin and hematocrit are listed below.  Recent Labs     03/17/25  0909   HGB 7.5*   HCT 23.5*     Plan  - Monitor serial CBC: Daily  - Transfuse PRBC if patient becomes hemodynamically unstable, symptomatic or H/H drops below 7/21.  - Patient has not received any PRBC transfusions to date  - Patient's anemia is currently stable

## 2025-03-17 NOTE — ASSESSMENT & PLAN NOTE
"Per [facility] report 02/2025:  "Extensive multivessel coronary artery disease which is not amenable to revascularization (either PCI or bypass surgery - transmural scar in distal LAD/left circumflex/PLB noted on PET scan done in October, 2024) would recommend maximal medical therapy"  Denies chest pain at this time  Continue home medication regimen as BP will allow  "

## 2025-03-17 NOTE — ASSESSMENT & PLAN NOTE
"Patient's FSGs are uncontrolled due to hyperglycemia on current medication regimen.  Last A1c reviewed-   Lab Results   Component Value Date    HGBA1C 5.1 01/08/2025     Most recent fingerstick glucose reviewed- No results for input(s): "POCTGLUCOSE" in the last 24 hours.  Current correctional scale  Low  Maintain anti-hyperglycemic dose as follows-   Antihyperglycemics (From admission, onward)      Start     Stop Route Frequency Ordered    03/17/25 1258  insulin aspart U-100 pen 0-5 Units         -- SubQ Before meals & nightly PRN 03/17/25 1158          Hold Oral hypoglycemics while patient is in the hospital.  "

## 2025-03-17 NOTE — HPI
50 yo M with HTN, T2DM, prior CVA with left sided hemiparesis, multi-vessel CAD non-amenable to revascularization by PCI or CABG, HFpEF, anuric ESRD HD MWF, RCC s/p bilateral nephrectomy presented to the ED with chief complaint of SOB. Patient reports the symptoms started approximately 1 week ago after missing two consecutive HD appointments due to an acute illness. He presented to [facility] on 03/10 for evaluation and underwent urgent HD. While admitted, patient was also started on empiric therapy for CAP given clinical presentation and CT findings which could not rule-out an infectious process. He was discharged on Cefdinir + Doxycycline, though reports he stopped taking them after about two days as they weren't helping. He reports a persistent non-productive cough as well as congestion and rhinorrhea. He denies fever, chills, myalgias, n/v/d. Patient's last HD session was on Friday 3/14, however, had to be cut short d/t hypotension.      In ED: VSS, AF. CBC notable for leukocytosis 16k. H/H stable 7.5/23.5. CMP with hyperkalemia 5.8 and hyperphosphatemia 6.0.   Cr 10.9. BNP > 4900. Troponin 2.71, down from approx 40 one month ago. He was placed on 2L NC. Admitted to Hospital Medicine for HD and treatment of PNA.

## 2025-03-17 NOTE — HPI
50 y/o M with a PMHx of ESRD on dialysis MWF, HFpEF ( 50-55% GD II DD), HTN, CAD, NSTEMI, stroke, renal cell carcinoma s/p nephrectomy, and hyperparathyroidism presents to the ED via EMS for evaluation of shortness of breath x 3 days. Pt recently diagnosed with pneumonia on 3/12/25 and prescribed zithromax. Pt states he was compliant with abx, with no relief. Pt admitted for sob, pulmonary edema, elevated troponin, and leukocytosis. Vitals on arrival /94, Hr 90bpm, RR 22, temp 97.8, O2 99% on 3L NC. Pt does not use home O2. Labs on arrival WBC 16,000, Hgb 7.5, K 5.8, , Cr 10.9, Phos 6.0, CCa 10.8, trop 2.715, BNP> 4900. EKG no STEMI.  CXR b/l pneumonia and pulmonary edema.    Nephrology consulted for hemodialysis.    Prior records obtained and reviewed.  He receives HD MWF via RUE AVF at [facility] under the c/o Dr. Bryant. Treatment duration: 4 hours. EDW 70.5. He last received HD on Friday, he states less fluid was removed, due to hypotension. Pt c/o sob during my assessment, no other complaints. Dialysis consent obtained by Dr Baekr.

## 2025-03-17 NOTE — SUBJECTIVE & OBJECTIVE
Past Medical History:   Diagnosis Date    CAD (coronary artery disease), native coronary artery 11/21/2019    Cataract     Diabetes mellitus     Diabetic retinopathy     Dialysis patient     DM type 2 causing renal disease, not at goal     ESRD (end stage renal disease) started dialysis 01/2014 06/05/2014    H/o acute coronary syndrome with high troponin 03/11/2024    History of colon polyps 02/10/2021    History of COVID-19 12/29/2022    Hyperparathyroidism, secondary renal 06/05/2014    Hypertension     Hypertensive emergency 11/27/2023    NSTEMI (non-ST elevated myocardial infarction) 12/21/2013    Organ transplant candidate 06/05/2014    Palliative care encounter 10/29/2024    Pleural effusion 06/07/2024    Pneumonia     Post PTCA 08/26/2020    RCA H[facility]ARY 7-25-20    Renal cell carcinoma of right kidney     Renal hypertension     Stroke        Past Surgical History:   Procedure Laterality Date    ANGIOGRAM, CORONARY, WITH LEFT HEART CATHETERIZATION N/A 7/1/2021    Procedure: Angiogram, Coronary, with Left Heart Cath;  Surgeon: Miki Zendejas MD;  Location: [facility] CATH LAB;  Service: Cardiology;  Laterality: N/A;    ANGIOGRAM, CORONARY, WITH LEFT HEART CATHETERIZATION N/A 11/28/2023    Procedure: Angiogram, Coronary, with Left Heart Cath;  Surgeon: Noah Pendleton MD;  Location: [facility] CATH LAB;  Service: Cardiology;  Laterality: N/A;    COLONOSCOPY N/A 5/3/2017    Procedure: COLONOSCOPY;  Surgeon: Rufino Carpenter MD;  Location: [address]);  Service: Endoscopy;  Laterality: N/A;  pt states can only schedule on Wednesdays    COLONOSCOPY N/A 2/9/2021    Procedure: COLONOSCOPY;  Surgeon: Edil Wong MD;  Location: [address]);  Service: Endoscopy;  Laterality: N/A;  Dialysis MWF/ labwork day of procedure  right arm aceess  per Dr. Colin pt can hold Plavix 5 days prior see note- sm  COVID test on 2/6/21 at [facility] - sm    COLONOSCOPY N/A 2/10/2021    Procedure: COLONOSCOPY;  Surgeon: Robert Ayers,  MD;  Location: [facility] ENDO (4TH FLR);  Service: Endoscopy;  Laterality: N/A;  rescheduled due to poor bowel prep-BB  negative covid screening 2/6/21-BB  dialysis M-W-F-BB  okay to r/s for 2/9/21 and to hold Plavix per Dr. KIRAN Wong-BB  labs same day-BB    CORONARY ANGIOGRAPHY N/A 2/10/2025    Procedure: ANGIOGRAM, CORONARY ARTERY;  Surgeon: Ajith Hutchins MD;  Location: [facility] CATH LAB;  Service: Cardiology;  Laterality: N/A;    CORONARY STENT PLACEMENT N/A 7/25/2019    Procedure: INSERTION, STENT, CORONARY ARTERY;  Surgeon: Miki Zendejas MD;  Location: [facility] CATH LAB;  Service: Cardiology;  Laterality: N/A;    DIALYSIS FISTULA CREATION      FISTULOGRAM N/A 2/18/2019    Procedure: Fistulogram;  Surgeon: Yaneth De La Cruz MD;  Location: [facility] CATH LAB;  Service: Cardiology;  Laterality: N/A;    FISTULOGRAM Right 7/23/2019    Procedure: Fistulogram;  Surgeon: Oz Cordoba MD;  Location: [facility] CATH LAB;  Service: Cardiology;  Laterality: Right;    LAPAROSCOPIC ROBOT-ASSISTED SURGICAL REMOVAL OF KIDNEY USING DA JAIME XI Right 5/19/2022    Procedure: XI ROBOTIC NEPHRECTOMY;  Surgeon: Aidan Hendricks MD;  [address];  Service: Urology;  Laterality: Right;  3hrs    LAPAROSCOPIC ROBOT-ASSISTED SURGICAL REMOVAL OF KIDNEY USING DA JAIME XI Left 1/5/2023    Procedure: XI ROBOTIC NEPHRECTOMY;  Surgeon: Aidan Hendricks MD;  [address];  Service: Urology;  Laterality: Left;  4 hrs    LEFT HEART CATHETERIZATION Left 7/25/2019    Procedure: Left heart cath;  Surgeon: Miki Zendejas MD;  Location: [facility] CATH LAB;  Service: Cardiology;  Laterality: Left;    LEFT HEART CATHETERIZATION Left 2/10/2025    Procedure: Left heart cath;  Surgeon: Ajith Hutchins MD;  Location: [facility] CATH LAB;  Service: Cardiology;  Laterality: Left;    REPAIR  1/5/2023    Procedure: REPAIR; COLOTOMY;  Surgeon: Maikel Lamb MD;  Location: [facility] 2ND FLR;  Service: General;;    RETINAL LASER PROCEDURE  Bilateral 2018 or 2017    Dr. Rothman    VASCULAR SURGERY         Review of patient's allergies indicates:   Allergen Reactions    No known allergies      Current Facility-Administered Medications   Medication Frequency    acetaminophen tablet 325 mg Q6H PRN    benzonatate capsule 100 mg TID PRN    calcium carbonate 200 mg calcium (500 mg) chewable tablet 500 mg TID PRN    dextrose 50% injection 12.5 g PRN    dextrose 50% injection 25 g PRN    diphenhydrAMINE capsule 25 mg Q6H PRN    glucagon (human recombinant) injection 1 mg PRN    glucose chewable tablet 16 g PRN    glucose chewable tablet 24 g PRN    guaiFENesin 100 mg/5 ml syrup 200 mg Q4H PRN    heparin (porcine) injection 5,000 Units Q8H    insulin aspart U-100 pen 0-5 Units QID (AC + HS) PRN    melatonin tablet 6 mg Nightly PRN    ondansetron disintegrating tablet 4 mg Q6H PRN    polyethylene glycol packet 17 g Daily PRN    promethazine (PHENERGAN) 25 mg in 0.9% NaCl 50 mL IVPB Q6H PRN    simethicone chewable tablet 80 mg TID PRN    sodium chloride 0.9% flush 10 mL PRN     Current Outpatient Medications   Medication    aspirin (ECOTRIN) 81 MG EC tablet    carvediloL (COREG) 25 MG tablet    clopidogreL (PLAVIX) 75 mg tablet    ezetimibe (ZETIA) 10 mg tablet    hydrALAZINE (APRESOLINE) 25 MG tablet    isosorbide mononitrate (IMDUR) 120 MG 24 hr tablet    NIFEdipine (PROCARDIA-XL) 60 MG (OSM) 24 hr tablet    nitroGLYCERIN (NITROSTAT) 0.4 MG SL tablet    ranolazine (RANEXA) 500 MG Tb12    sevelamer carbonate (RENVELA) 800 mg Tab    atorvastatin (LIPITOR) 80 MG tablet    benzonatate (TESSALON) 100 MG capsule     Facility-Administered Medications Ordered in Other Encounters   Medication Frequency    lidocaine (PF) 10 mg/ml (1%) injection 10 mg Once     Family History       Problem Relation (Age of Onset)    Cancer Maternal Grandfather    Cataracts Mother    Diabetes Mother    Heart disease Brother    Hypertension Mother, Father, Sister, Brother    Kidney disease  Brother          Tobacco Use    Smoking status: Never    Smokeless tobacco: Never   Substance and Sexual Activity    Alcohol use: Not Currently     Comment: One drink a month    Drug use: No    Sexual activity: Yes     Partners: Female     Birth control/protection: None     Review of Systems   Constitutional:  Negative for fever.   HENT:  Negative for congestion.    Respiratory:  Positive for cough and shortness of breath.    Cardiovascular:  Negative for chest pain.   Gastrointestinal:  Negative for abdominal pain.   Genitourinary:  Positive for decreased urine volume.   Musculoskeletal:  Negative for back pain.   Neurological:  Negative for headaches.   Hematological:  Negative for adenopathy.   Psychiatric/Behavioral:  Negative for behavioral problems.      Objective:     Vital Signs (Most Recent):  Temp: 97.8 °F (36.6 °C) (03/17/25 0838)  Pulse: 88 (03/17/25 1200)  Resp: (!) 34 (03/17/25 1200)  BP: 130/83 (03/17/25 1200)  SpO2: 96 % (03/17/25 1200) Vital Signs (24h Range):  Temp:  [97.8 °F (36.6 °C)] 97.8 °F (36.6 °C)  Pulse:  [87-92] 88  Resp:  [9-34] 34  SpO2:  [92 %-99 %] 96 %  BP: (123-141)/(77-94) 130/83     Weight: 72.5 kg (159 lb 14.4 oz) (03/17/25 1100)  Body mass index is 25.04 kg/m².  Body surface area is 1.85 meters squared.    No intake/output data recorded.     Physical Exam  Vitals and nursing note reviewed.   Constitutional:       Appearance: Normal appearance.   HENT:      Head: Normocephalic.   Cardiovascular:      Rate and Rhythm: Normal rate.      Pulses: Normal pulses.      Comments: + RUE AVF, + thrill  Pulmonary:      Breath sounds: Rhonchi present.      Comments: On 3L of O2 NC  Abdominal:      General: There is no distension.      Palpations: Abdomen is soft.      Tenderness: There is no abdominal tenderness.   Musculoskeletal:      Cervical back: Normal range of motion.      Right lower leg: Edema (1+) present.      Left lower leg: Edema (1+) present.   Neurological:      Mental Status:  "He is alert.   Psychiatric:         Mood and Affect: Mood normal.          Significant Labs:  Cardiac Markers: No results for input(s): "CKMB", "TROPONINT", "MYOGLOBIN" in the last 168 hours.  CBC:   Recent Labs   Lab 03/17/25  0909   WBC 16.35*   RBC 2.60*   HGB 7.5*   HCT 23.5*      MCV 90   MCH 28.8   MCHC 31.9*     CMP:   Recent Labs   Lab 03/17/25  0909   *   CALCIUM 9.6   ALBUMIN 2.5*   PROT 8.0      K 5.8*   CO2 18*      *   CREATININE 10.9*   ALKPHOS 115   ALT 99*   AST 89*   BILITOT 1.1*     All labs within the past 24 hours have been reviewed.    Significant Imaging:  ECG: Reviewed  X-Ray: Reviewed  Echo: Reviewed    "

## 2025-03-17 NOTE — ASSESSMENT & PLAN NOTE
Patient has Diastolic (HFpEF) heart failure that is Acute on chronic. On presentation their CHF was decompensated. Evidence of decompensated CHF on presentation includes: edema, orthopnea, dyspnea on exertion (OSEGUERA), and shortness of breath. The etiology of their decompensation is likely shortened HD treatment on 03/14. Most recent BNP and echo results are listed below.  Recent Labs     03/17/25  0909   BNP >4,900*     Latest ECHO  Results for orders placed during the hospital encounter of 02/09/25    Echo    Interpretation Summary    Left Ventricle: The left ventricle is mildly dilated. Mildly increased wall thickness. There is mild concentric hypertrophy. There is low normal systolic function with a visually estimated ejection fraction of 50 - 55%. Grade II diastolic dysfunction.    Right Ventricle: Normal right ventricular cavity size. Systolic function is normal.    Left Atrium: Left atrium is mildly dilated.    Aortic Valve: The aortic valve is a trileaflet valve. There is moderate aortic valve sclerosis.    Mitral Valve: There is mild to moderate regurgitation.    Tricuspid Valve: There is mild regurgitation.    Current Heart Failure Medications       Plan  - Monitor strict I&Os and daily weights.    - Place on telemetry  - Low sodium diet  - Place on fluid restriction of 1.5 L.   - Cardiology has not been consulted  - The patient's volume status is stable but not at their baseline as indicated by edema, orthopnea, dyspnea on exertion (OSEGUERA), and shortness of breath  - HD for volume management. Will not initiate Lasix as patient is s/p bilateral nephrectomy.

## 2025-03-17 NOTE — ED NOTES
Pt bib EMS due to SOB, per EMS pt sats were 91% on room air, pt states he has hx of HF and Renal disease with kidney removal 2 years ago, bilateral leg swelling noted, received dialysis Friday but unable to complete due to low b/p.

## 2025-03-17 NOTE — PROGRESS NOTES
CHW - Initial Contact    This Community Health Worker updated and verified the Social Determinant of Health questionnaire with patient  at bedside  today.    Pt identified barriers of most importance are: has issues with transportation.   Referrals to community agencies completed with patient/caregiver consent outside of [facility] include: yes: [employer] transportation.  Referrals were put through [facility] - no: none at this time.  Support and Services: has no support at this time.  Other information discussed the patient needs / wants help with: Updated and verified SDOH with patient at bedside today, pt has issues with transportation. Will follow up in two weeks to check status of referrals and pt's future needs.    Follow up required: yes.  Follow-up Outreach - Due: 3/23/2025

## 2025-03-17 NOTE — ASSESSMENT & PLAN NOTE
Patient with Hypoxic Respiratory failure which is Acute.  he is not on home oxygen. Supplemental oxygen was provided and noted-  2L NC    .   Signs/symptoms of respiratory failure include- respiratory distress. Contributing diagnoses includes - CHF, Pneumonia, and ESRD  Labs and images were reviewed. Patient Has not had a recent ABG. Will treat underlying causes and adjust management of respiratory failure as follows-     Multifactorial with volume overload in the setting of ESRD/CHF and shortened dialysis as well as possible pneumonia, see below.  Volume management with HD per Nephrology. Unable to initiate Lasix in setting of bilateral nephrectomy.   See plan for CHF below  See plan for multifocal pneumonia below

## 2025-03-17 NOTE — ASSESSMENT & PLAN NOTE
Recently hospitalized at [facility] 03/10 - 03/12. CT Chest showed mild cardiomegaly with bilateral airspace disease with left upper and lower lobe consolidation, patchy infiltrate in the right and bilateral ground-glass infiltrates. Treated with Rocephin and Doxycycline for concerns of CAP. Was discharged on Doxycycline and Cefdinir.    Now with new-onset leukocytosis 16k. CXR Cardiomegaly with patchy interstitial and airspace opacities throughout both lungs, slightly increased in conspicuity from comparison CT 03/10/2025. Presentation more consistent with volume overload, however, will initiate treatment for PNA given leukocytosis and URI symptoms.    Procal ordered, however, often unreliable in ESRD.   RIP, Flu, COVID, sputum culture pending  Continue Zosyn for now. Deescalate as necessary pending cultures.   Supplemental O2 as needed

## 2025-03-17 NOTE — ED PROVIDER NOTES
Encounter Date: 3/17/2025    SCRIBE #1 NOTE: I, Maxine Cutler, am scribing for, and in the presence of,  Edil Vu MD.       History     Chief Complaint   Patient presents with    Shortness of Breath     Pt BIB EMS, c/o SOB, cough, and bilateral foot and leg swelling x 3 days. Pt reports unable to finish dialysis treatment on Friday due to hypotension. Pt is due for dialysis on today. Pt denies any CP, abd pain or n/v/d     50 y/o M with a PMHx of ESRD on dislysis MWF, CHF, HTN, CAD, NSTEMI, stroke, renal cell carcinoma, and hyperparathyroidism presents to the ED via EMS for evaluation of shortness of breath x3 days. Pt reports associated leg swelling and dry cough. He states that he has experienced similar SOB in the past but not similar leg swelling. Patient denies chest pain, fever, chills, abdominal pain, nausea, vomiting, diarrhea, dysuria, headaches, congestion, sore throat, arm trouble, eye pain, ear pain, rash, or other associated symptoms. Per EMS, the pt was not able to be dialyzed 3 days ago due to hypotension. The pt was 91% on room air with EMS. He denies being on home oxygen. Pt also denies any history of DVT or PE. Of note, the pt reports that he was recently diagnosed with pneumonia 3 days ago and prescribed abx but he is unsure which medications.      The history is provided by the patient and the EMS personnel. No  was used.     Review of patient's allergies indicates:   Allergen Reactions    No known allergies      Past Medical History:   Diagnosis Date    CAD (coronary artery disease), native coronary artery 11/21/2019    Cataract     Diabetes mellitus     Diabetic retinopathy     Dialysis patient     DM type 2 causing renal disease, not at goal     ESRD (end stage renal disease) started dialysis 01/2014 06/05/2014    H/o acute coronary syndrome with high troponin 03/11/2024    History of colon polyps 02/10/2021    History of COVID-19 12/29/2022    Hyperparathyroidism,  secondary renal 06/05/2014    Hypertension     Hypertensive emergency 11/27/2023    NSTEMI (non-ST elevated myocardial infarction) 12/21/2013    Organ transplant candidate 06/05/2014    Palliative care encounter 10/29/2024    Pleural effusion 06/07/2024    Pneumonia     Post PTCA 08/26/2020    RCA H[facility]ARY 7-25-20    Renal cell carcinoma of right kidney     Renal hypertension     Stroke      Past Surgical History:   Procedure Laterality Date    ANGIOGRAM, CORONARY, WITH LEFT HEART CATHETERIZATION N/A 7/1/2021    Procedure: Angiogram, Coronary, with Left Heart Cath;  Surgeon: Miki Zendejas MD;  Location: [facility] CATH LAB;  Service: Cardiology;  Laterality: N/A;    ANGIOGRAM, CORONARY, WITH LEFT HEART CATHETERIZATION N/A 11/28/2023    Procedure: Angiogram, Coronary, with Left Heart Cath;  Surgeon: Noah Pendleton MD;  Location: [facility] CATH LAB;  Service: Cardiology;  Laterality: N/A;    COLONOSCOPY N/A 5/3/2017    Procedure: COLONOSCOPY;  Surgeon: Rufino Carpenter MD;  Location: [facility] ([address] [place] [address]);  Service: Endoscopy;  Laterality: N/A;  pt states can only schedule on Wednesdays    COLONOSCOPY N/A 2/9/2021    Procedure: COLONOSCOPY;  Surgeon: Edil Wong MD;  [facility]  Service: Endoscopy;  Laterality: N/A;  Dialysis MWF/ labwork day of procedure  right arm aceess  per Dr. Colin pt can hold Plavix 5 days prior see note- sm  COVID test on 2/6/21 at [facility] - sm    COLONOSCOPY N/A 2/10/2021    Procedure: COLONOSCOPY;  Surgeon: Robert Ayers MD;  Location: [address]);  Service: Endoscopy;  Laterality: N/A;  rescheduled due to poor bowel prep-BB  negative covid screening 2/6/21-BB  dialysis M-W-F-BB  okay to r/s for 2/9/21 and to hold Plavix per Dr. KIRAN Wong-BB  labs same day-BB    CORONARY ANGIOGRAPHY N/A 2/10/2025    Procedure: ANGIOGRAM, CORONARY ARTERY;  Surgeon: Ajith Hutchins MD;  Location: [facility] CATH LAB;  Service: Cardiology;  Laterality: N/A;    CORONARY STENT PLACEMENT N/A  7/25/2019    Procedure: INSERTION, STENT, CORONARY ARTERY;  Surgeon: Miki Zendejas MD;  Location: [facility] CATH LAB;  Service: Cardiology;  Laterality: N/A;    DIALYSIS FISTULA CREATION      FISTULOGRAM N/A 2/18/2019    Procedure: Fistulogram;  Surgeon: Yaneth De La Cruz MD;  Location: [facility] CATH LAB;  Service: Cardiology;  Laterality: N/A;    FISTULOGRAM Right 7/23/2019    Procedure: Fistulogram;  Surgeon: Oz Cordoba MD;  Location: [facility] CATH LAB;  Service: Cardiology;  Laterality: Right;    LAPAROSCOPIC ROBOT-ASSISTED SURGICAL REMOVAL OF KIDNEY USING DA JAIME XI Right 5/19/2022    Procedure: XI ROBOTIC NEPHRECTOMY;  Surgeon: Aidan Hendricks MD;  [address]  Service: Urology;  Laterality: Right;  3hrs    LAPAROSCOPIC ROBOT-ASSISTED SURGICAL REMOVAL OF KIDNEY USING DA JAIME XI Left 1/5/2023    Procedure: XI ROBOTIC NEPHRECTOMY;  Surgeon: Aidan Hendricks MD;  [address]  Service: Urology;  Laterality: Left;  4 hrs    LEFT HEART CATHETERIZATION Left 7/25/2019    Procedure: Left heart cath;  Surgeon: Miki Zendejas MD;  Location: [facility] CATH LAB;  Service: Cardiology;  Laterality: Left;    LEFT HEART CATHETERIZATION Left 2/10/2025    Procedure: Left heart cath;  Surgeon: Ajith Hutchins MD;  Location: [facility] CATH LAB;  Service: Cardiology;  Laterality: Left;    REPAIR  1/5/2023    Procedure: REPAIR; COLOTOMY;  Surgeon: Maikel Lamb MD;  Location: [address] FLR;  Service: General;;    RETINAL LASER PROCEDURE Bilateral 2018 or 2017    Dr. Rothman    VASCULAR SURGERY       Family History   Problem Relation Name Age of Onset    Hypertension Mother      Diabetes Mother      Cataracts Mother      Hypertension Father      Hypertension Sister      Kidney disease Brother      Hypertension Brother      Heart disease Brother      Cancer Maternal Grandfather          Colon CA    Colon cancer Neg Hx      Esophageal cancer Neg Hx      Stomach cancer Neg Hx      Rectal cancer  Neg Hx      Ulcerative colitis Neg Hx      Irritable bowel syndrome Neg Hx      Crohn's disease Neg Hx      Celiac disease Neg Hx      Glaucoma Neg Hx      Macular degeneration Neg Hx       Social History[1]  Review of Systems   Constitutional:  Negative for chills, diaphoresis and fever.   HENT:  Negative for ear pain and sore throat.    Eyes:  Negative for photophobia, pain, discharge, redness, itching and visual disturbance.   Respiratory:  Positive for cough and shortness of breath.    Cardiovascular:  Positive for leg swelling. Negative for chest pain.   Gastrointestinal:  Negative for abdominal pain, diarrhea and vomiting.   Genitourinary:  Negative for dysuria.   Musculoskeletal:  Negative for arthralgias, joint swelling and myalgias.   Skin:  Negative for rash.   Neurological:  Negative for headaches.       Physical Exam     Initial Vitals [03/17/25 0838]   BP Pulse Resp Temp SpO2   (!) 130/94 90 (!) 22 97.8 °F (36.6 °C) 99 %      MAP       --         Physical Exam  The patient was examined specifically for the following:   General:No significant distress, Good color, Warm and dry. Head and neck:Scalp atraumatic, Neck supple. Neurological:Appropriate conversation, Gross motor deficits. Eyes:Conjugate gaze, Clear corneas. ENT: No epistaxis. Cardiac: Regular rate and rhythm, Grossly normal heart tones. Pulmonary: Wheezing, Rales. Gastrointestinal: Abdominal tenderness, Abdominal distention. Musculoskeletal: Extremity deformity, Apparent pain with range of motion of the joints. Skin: Rash.   The findings on examination were normal except for the following:  The patient is slightly tachypneic.  There is no severe respiratory distress.  The patient has a rales in both lung bases.  Oxygen saturations are 99% on 3 L nasal cannula.  The abdomen is nontender.  The patient has swelling of the lower extremities worse on the right than on the left.  The patient is alert and bright.  The neck is supple.  Mental status  examination, cranial nerves, motor and sensory examination grossly unremarkable.   ED Course   Critical Care    Date/Time: 3/17/2025 10:22 AM    Performed by: Edil Vu MD  Authorized by: Edil Vu MD  Direct patient critical care time: 22 minutes  Additional history critical care time: 11 minutes  Ordering / reviewing critical care time: 11 minutes  Documentation critical care time: 11 minutes  Consulting other physicians critical care time: 11 minutes  Total critical care time (exclusive of procedural time) : 66 minutes  Critical care time was exclusive of separately billable procedures and treating other patients and teaching time.  Critical care was necessary to treat or prevent imminent or life-threatening deterioration of the following conditions: respiratory failure, cardiac failure and renal failure.  Critical care was time spent personally by me on the following activities: development of treatment plan with patient or surrogate, discussions with consultants, discussions with primary provider, evaluation of patient's response to treatment, examination of patient, obtaining history from patient or surrogate, ordering and performing treatments and interventions, ordering and review of laboratory studies, ordering and review of radiographic studies, pulse oximetry, re-evaluation of patient's condition and review of old charts.        Labs Reviewed   COMPREHENSIVE METABOLIC PANEL - Abnormal       Result Value    Sodium 140      Potassium 5.8 (*)     Chloride 104      CO2 18 (*)     Glucose 194 (*)      (*)     Creatinine 10.9 (*)     Calcium 9.6      Total Protein 8.0      Albumin 2.5 (*)     Total Bilirubin 1.1 (*)     Alkaline Phosphatase 115      AST 89 (*)     ALT 99 (*)     eGFR 5 (*)     Anion Gap 18 (*)    CBC W/ AUTO DIFFERENTIAL - Abnormal    WBC 16.35 (*)     RBC 2.60 (*)     Hemoglobin 7.5 (*)     Hematocrit 23.5 (*)     MCV 90      MCH 28.8      MCHC 31.9 (*)     RDW 16.8 (*)      Platelets 431      MPV 9.7      Immature Granulocytes 0.6 (*)     Gran # (ANC) 13.5 (*)     Immature Grans (Abs) 0.10 (*)     Lymph # 1.5      Mono # 0.8      Eos # 0.4      Baso # 0.11      nRBC 0      Gran % 82.5 (*)     Lymph % 8.9 (*)     Mono % 4.8      Eosinophil % 2.5      Basophil % 0.7      Differential Method Automated     TROPONIN I - Abnormal    Troponin I 2.715 (*)    B-TYPE NATRIURETIC PEPTIDE - Abnormal    BNP >4,900 (*)    PHOSPHORUS - Abnormal    Phosphorus 6.0 (*)    PROTIME-INR - Abnormal    Prothrombin Time 13.1 (*)     INR 1.2     MAGNESIUM    Magnesium 2.1     LACTIC ACID, PLASMA    Lactate (Lactic Acid) 1.4       EKG Readings: (Independently Interpreted)   This patient is in a normal sinus rhythm with a heart rate in 91.  The patient has a left bundle branch block with a left axis deviation.  There are nonspecific ST segment and T-wave changes.  There is no definite evidence of acute myocardial infarction or malignant arrhythmia.  This is doctor Vu dictating an I independently interpreted this EKG.       Imaging Results              X-Ray Chest AP Portable (Final result)  Result time 03/17/25 08:57:54      Final result by Henry Angulo MD (03/17/25 08:57:54)                   Impression:      Cardiomegaly with patchy interstitial and airspace opacities throughout both lungs, slightly increased in conspicuity from comparison CT 03/10/2025.  Findings likely relate to evolving edema, noting that underlying interstitial pneumonia cannot be excluded.  Correlation is advised.      Electronically signed by: Henry Angulo  Date:    03/17/2025  Time:    08:57               Narrative:    EXAMINATION:  XR CHEST AP PORTABLE    CLINICAL HISTORY:  chest pain;    TECHNIQUE:  Single frontal view of the chest was performed.    COMPARISON:  Chest radiograph 03/10/2025    FINDINGS:  Cardiomediastinal silhouette is unchanged in size.  Patient is rotated, limiting evaluation.  Aortic  atherosclerosis.    Patchy interstitial and airspace opacities throughout both lungs, slightly increased in conspicuity from comparison CT 03/10/2025.  No new consolidation, worsening pleural effusion, or pneumothorax.    Osseous structures demonstrate no evidence for acute fracture or osseous destructive lesion.                                       Medications - No data to display  Medical Decision Making  Amount and/or Complexity of Data Reviewed  Independent Historian: EMS     Details: See HPI.  Labs: ordered. Decision-making details documented in ED Course.  Radiology: ordered. Decision-making details documented in ED Course.  ECG/medicine tests: ordered and independent interpretation performed. Decision-making details documented in ED Course.    This patient presents emergency room with shortness of breath.  The patient had oxygen saturations of 91% in the field on room air.  He has a history of end-stage renal disease on hemodialysis.  He has a markedly elevated BNP.  The patient last had an echocardiogram on February 10th.  He has a ejection fraction of 50-55% with grade 2 diastolic dysfunction.  The patient has known coronary artery disease that is not amenable to coronary artery bypass grafting or angioplasty or stenting.  He has an elevated troponin.  He did not receive full dialysis on Friday because his blood pressure was too low.  He has a blood pressure of 141/83 here in the emergency room.  Chest x-ray reveals what is likely pulmonary edema pneumonias in the differential diagnosis.  The patient has a [place] blood count [place] 129[address], up from a baseline of 12.  The patient has no fever.  He has no tachycardia.  I will place him to observation for dialysis and further evaluation and treatment by Hospital Medicine.        Scribe Attestation:   Scribe #1: I performed the above scribed service and the documentation accurately describes the services I performed. I attest to the accuracy of the note.                            Please note that the documentation on this chart was provided by the scribe above on the date of service noted above, and that the documentation in the chart accurately reflects the work and decisions made by me alone.  Signed, Dr. Vu    Clinical Impression:  Final diagnoses:  [R06.02] Shortness of breath  [J81.0] Acute pulmonary edema (Primary)  [D72.829] Leukocytosis, unspecified type  [R79.89] Elevated troponin  [Z86.79] History of coronary artery disease          ED Disposition Condition    Observation Stable                  Edil Vu MD  03/17/25 1048         [1]   Social History  Tobacco Use    Smoking status: Never    Smokeless tobacco: Never   Substance Use Topics    Alcohol use: Not Currently     Comment: One drink a month    Drug use: No        Edil Vu MD  03/17/25 7954

## 2025-03-17 NOTE — SUBJECTIVE & OBJECTIVE
Past Medical History:   Diagnosis Date    CAD (coronary artery disease), native coronary artery 11/21/2019    Cataract     Diabetes mellitus     Diabetic retinopathy     Dialysis patient     DM type 2 causing renal disease, not at goal     ESRD (end stage renal disease) started dialysis 01/2014 06/05/2014    H/o acute coronary syndrome with high troponin 03/11/2024    History of colon polyps 02/10/2021    History of COVID-19 12/29/2022    Hyperparathyroidism, secondary renal 06/05/2014    Hypertension     Hypertensive emergency 11/27/2023    NSTEMI (non-ST elevated myocardial infarction) 12/21/2013    Organ transplant candidate 06/05/2014    Palliative care encounter 10/29/2024    Pleural effusion 06/07/2024    Pneumonia     Post PTCA 08/26/2020    RCA [facility] 7-25-20    Renal cell carcinoma of right kidney     Renal hypertension     Stroke        Past Surgical History:   Procedure Laterality Date    ANGIOGRAM, CORONARY, WITH LEFT HEART CATHETERIZATION N/A 7/1/2021    Procedure: Angiogram, Coronary, with Left Heart Cath;  Surgeon: Miki Zendejas MD;  Location: [facility] CATH LAB;  Service: Cardiology;  Laterality: N/A;    ANGIOGRAM, CORONARY, WITH LEFT HEART CATHETERIZATION N/A 11/28/2023    Procedure: Angiogram, Coronary, with Left Heart Cath;  Surgeon: Noah Pendleton MD;  Location: [facility] CATH LAB;  Service: Cardiology;  Laterality: N/A;    COLONOSCOPY N/A 5/3/2017    Procedure: COLONOSCOPY;  Surgeon: Rufino Carpenter MD;  Location: [address]);  Service: Endoscopy;  Laterality: N/A;  pt states can only schedule on Wednesdays    COLONOSCOPY N/A 2/9/2021    Procedure: COLONOSCOPY;  Surgeon: Edil Wong MD;  Location: [address]);  Service: Endoscopy;  Laterality: N/A;  Dialysis MWF/ labwork day of procedure  right arm aceess  per Dr. Colin pt can hold Plavix 5 days prior see note- sm  COVID test on 2/6/21 at [facility] - sm    COLONOSCOPY N/A 2/10/2021    Procedure: COLONOSCOPY;  Surgeon: Robert Ayers,  MD;  Location: [facility] ENDO (4TH FLR);  Service: Endoscopy;  Laterality: N/A;  rescheduled due to poor bowel prep-BB  negative covid screening 2/6/21-BB  dialysis M-W-F-BB  okay to r/s for 2/9/21 and to hold Plavix per Dr. KIRAN Wong-BB  labs same day-BB    CORONARY ANGIOGRAPHY N/A 2/10/2025    Procedure: ANGIOGRAM, CORONARY ARTERY;  Surgeon: Ajith Hutchins MD;  Location: [facility] CATH LAB;  Service: Cardiology;  Laterality: N/A;    CORONARY STENT PLACEMENT N/A 7/25/2019    Procedure: INSERTION, STENT, CORONARY ARTERY;  Surgeon: Miki Zendejas MD;  Location: [facility] CATH LAB;  Service: Cardiology;  Laterality: N/A;    DIALYSIS FISTULA CREATION      FISTULOGRAM N/A 2/18/2019    Procedure: Fistulogram;  Surgeon: Yaneth De La Cruz MD;  Location: [facility] CATH LAB;  Service: Cardiology;  Laterality: N/A;    FISTULOGRAM Right 7/23/2019    Procedure: Fistulogram;  Surgeon: Oz Cordoba MD;  Location: [facility] CATH LAB;  Service: Cardiology;  Laterality: Right;    LAPAROSCOPIC ROBOT-ASSISTED SURGICAL REMOVAL OF KIDNEY USING DA JAIME XI Right 5/19/2022    Procedure: XI ROBOTIC NEPHRECTOMY;  Surgeon: Aidan Hendricks MD;  [address];  Service: Urology;  Laterality: Right;  3hrs    LAPAROSCOPIC ROBOT-ASSISTED SURGICAL REMOVAL OF KIDNEY USING DA JAIME XI Left 1/5/2023    Procedure: XI ROBOTIC NEPHRECTOMY;  Surgeon: Aidan Hendricks MD;  [address];  Service: Urology;  Laterality: Left;  4 hrs    LEFT HEART CATHETERIZATION Left 7/25/2019    Procedure: Left heart cath;  Surgeon: Miki Zendejas MD;  Location: [facility] CATH LAB;  Service: Cardiology;  Laterality: Left;    LEFT HEART CATHETERIZATION Left 2/10/2025    Procedure: Left heart cath;  Surgeon: Ajith Hutchins MD;  Location: [facility] CATH LAB;  Service: Cardiology;  Laterality: Left;    REPAIR  1/5/2023    Procedure: REPAIR; COLOTOMY;  Surgeon: Maikel Lamb MD;  Location: [facility] 2ND FLR;  Service: General;;    RETINAL LASER PROCEDURE  Bilateral 2018 or 2017    Dr. Rothman    VASCULAR SURGERY         Review of patient's allergies indicates:   Allergen Reactions    No known allergies        Current Facility-Administered Medications on File Prior to Encounter   Medication    lidocaine (PF) 10 mg/ml (1%) injection 10 mg     Current Outpatient Medications on File Prior to Encounter   Medication Sig    aspirin (ECOTRIN) 81 MG EC tablet Take 1 tablet (81 mg total) by mouth once daily.    carvediloL (COREG) 25 MG tablet Take 1 tablet (25 mg total) by mouth 2 (two) times daily with meals.    clopidogreL (PLAVIX) 75 mg tablet Take 1 tablet (75 mg total) by mouth once daily.    ezetimibe (ZETIA) 10 mg tablet Take 1 tablet (10 mg total) by mouth once daily.    hydrALAZINE (APRESOLINE) 25 MG tablet Take 1 tablet (25 mg total) by mouth every 8 (eight) hours.    isosorbide mononitrate (IMDUR) 120 MG 24 hr tablet Take 1 tablet (120 mg total) by mouth once daily.    NIFEdipine (PROCARDIA-XL) 60 MG (OSM) 24 hr tablet Take 1 tablet (60 mg total) by mouth 2 (two) times a day. (Patient taking differently: Take 60 mg by mouth once daily.)    nitroGLYCERIN (NITROSTAT) 0.4 MG SL tablet Place 1 tablet (0.4 mg total) under the tongue every 5 (five) minutes as needed for Chest pain.    ranolazine (RANEXA) 500 MG Tb12 Take 1 tablet (500 mg total) by mouth Daily.    sevelamer carbonate (RENVELA) 800 mg Tab Take 2 tablets (1,600 mg total) by mouth 3 (three) times daily with meals.    atorvastatin (LIPITOR) 80 MG tablet Take 1 tablet (80 mg total) by mouth once daily. (Patient not taking: Reported on 3/17/2025)    benzonatate (TESSALON) 100 MG capsule Take 1 capsule (100 mg total) by mouth 3 (three) times daily as needed for Cough. (Patient not taking: Reported on 3/17/2025)    [] cefdinir (OMNICEF) 300 MG capsule Take 1 capsule (300 mg total) by mouth every 48 hours. Take after dialysis for 4 days (Patient not taking: Reported on 3/17/2025)    [] doxycycline  (VIBRA-TABS) 100 MG tablet Take 1 tablet (100 mg total) by mouth every 12 (twelve) hours. for 4 days (Patient not taking: Reported on 3/17/2025)    [DISCONTINUED] acetaminophen (TYLENOL) 500 MG tablet Take 1,000 mg by mouth as needed for Pain.     Family History       Problem Relation (Age of Onset)    Cancer Maternal Grandfather    Cataracts Mother    Diabetes Mother    Heart disease Brother    Hypertension Mother, Father, Sister, Brother    Kidney disease Brother          Tobacco Use    Smoking status: Never    Smokeless tobacco: Never   Substance and Sexual Activity    Alcohol use: Not Currently     Comment: One drink a month    Drug use: No    Sexual activity: Yes     Partners: Female     Birth control/protection: None     Review of Systems   Constitutional:  Negative for activity change, appetite change, chills, fatigue and fever.   HENT:  Positive for congestion and rhinorrhea.    Respiratory:  Positive for cough (non-productive) and shortness of breath. Negative for wheezing.    Cardiovascular:  Positive for leg swelling. Negative for chest pain and palpitations.   Gastrointestinal:  Negative for abdominal pain, diarrhea, nausea and vomiting.   Neurological:  Negative for dizziness, syncope and light-headedness.     Objective:     Vital Signs (Most Recent):  Temp: 97.8 °F (36.6 °C) (03/17/25 0838)  Pulse: 87 (03/17/25 1300)  Resp: (!) 35 (03/17/25 1300)  BP: 127/75 (03/17/25 1300)  SpO2: (!) 92 % (03/17/25 1300) Vital Signs (24h Range):  Temp:  [97.8 °F (36.6 °C)] 97.8 °F (36.6 °C)  Pulse:  [87-92] 87  Resp:  [9-35] 35  SpO2:  [92 %-99 %] 92 %  BP: (123-141)/(75-94) 127/75     Weight: 72.5 kg (159 lb 14.4 oz)  Body mass index is 25.04 kg/m².     Physical Exam  Vitals and nursing note reviewed.   Constitutional:       Appearance: He is ill-appearing.   HENT:      Head: Normocephalic.      Mouth/Throat:      Pharynx: Oropharynx is clear.   Cardiovascular:      Rate and Rhythm: Normal rate and regular rhythm.       Arteriovenous access: Right arteriovenous access is present.     Comments: + RUE AVF, + thrill  Pulmonary:      Effort: No respiratory distress.      Breath sounds: Rales present. No wheezing.   Abdominal:      General: Bowel sounds are normal.      Palpations: Abdomen is soft.   Musculoskeletal:      Right lower leg: Edema present.      Left lower leg: No edema.   Skin:     General: Skin is warm and dry.   Neurological:      General: No focal deficit present.      Mental Status: He is alert. Mental status is at baseline.   Psychiatric:         Mood and Affect: Mood normal.         Behavior: Behavior normal.                Significant Labs: All pertinent labs within the past 24 hours have been reviewed.    Significant Imaging: I have reviewed all pertinent imaging results/findings within the past 24 hours.

## 2025-03-17 NOTE — PLAN OF CARE
[facility] Emergency Dept  Discharge Assessment    Primary Care Provider: Mireya Larose MD   Case Management Assessment     PCP: See above  Pharmacy: [facility]    Patient Arrived From: Home Alone  Existing Help at Home:     Barriers to Discharge: none    Discharge Plan:    A. home health   B. Home  Resume dialysis at [facility] (MWF)      Discharge planning assessment completed with patient's assistance.  Patient is from home alone and needed assistance with ADLs.  Patient provided with flyer to apply for [employer] for in-home assistance.  Patient stated that he is on service with [facility] .  Patient's son will provide transportation at discharge.            Discharge Assessment (most recent)       BRIEF DISCHARGE ASSESSMENT - 03/17/25 1051          Discharge Planning    Assessment Type Discharge Planning Brief Assessment     Resource/Environmental Concerns other (see comments)   Needs assistance with ADLs.    Assistance Needed Needs assistance with bathing and dressing.  Has mobility issues due to lower extremity swelling     Equipment Currently Used at Home walker, rolling;cane, straight     Current Living Arrangements apartment     Care Facility Name n/a     Patient/Family Anticipates Transition to home     Patient/Family Anticipated Services at Transition outpatient care;home health care     DME Needed Upon Discharge  other (see comments)   TBD    Discharge Plan A Home Health     Discharge Plan B Home        Physical Activity    On average, how many days per week do you engage in moderate to strenuous exercise (like a brisk walk)? 0 days     On average, how many minutes do you engage in exercise at this level? 0 min        Financial Resource Strain    How hard is it for you to pay for the very basics like food, housing, medical care, and heating? Somewhat hard        Housing Stability    In the last 12 months, was there a time when you were not able to pay the mortgage or rent on time? No      At any time in the past 12 months, were you homeless or living in a shelter (including now)? No        Transportation Needs    Has the lack of transportation kept you from medical appointments, meetings, work or from getting things needed for daily living? No        Food Insecurity    Within the past 12 months, you worried that your food would run out before you got the money to buy more. Never true     Within the past 12 months, the food you bought just didn't last and you didn't have money to get more. Never true        Stress    Do you feel stress - tense, restless, nervous, or anxious, or unable to sleep at night because your mind is troubled all the time - these days? Not at all        Social Isolation    How often do you feel lonely or isolated from those around you?  Never        Alcohol Use    Q1: How often do you have a drink containing alcohol? Never     Q2: How many drinks containing alcohol do you have on a typical day when you are drinking? Patient does not drink     Q3: How often do you have six or more drinks on one occasion? Never

## 2025-03-17 NOTE — CONSULTS
[facility] Emergency Dept  Nephrology  Consult Note    Patient Name: Haroldo Dickinson  MRN: [id number]  Admission Date: 3/17/2025  Hospital Length of Stay: 0 days  Attending Provider: Makenzie Germain MD   Primary Care Physician: Mireya Larose MD  Principal Problem:Acute hypoxemic respiratory failure    Inpatient consult to Nephrology  Consult performed by: Keely Martinez PA  Consult ordered by: Edil Vu MD  Reason for consult: hemodialysis        Subjective:     HPI: 48 y/o M with a PMHx of ESRD on dialysis MWF, HFpEF ( 50-55% GD II DD), HTN, CAD, NSTEMI, stroke, renal cell carcinoma s/p nephrectomy, and hyperparathyroidism presents to the ED via EMS for evaluation of shortness of breath x 3 days. Pt recently diagnosed with pneumonia on 3/12/25 and prescribed zithromax. Pt states he was compliant with abx, with no relief. Pt admitted for sob, pulmonary edema, elevated troponin, and leukocytosis. Vitals on arrival /94, Hr 90bpm, RR 22, temp 97.8, O2 99% on 3L NC. Pt does not use home O2. Labs on arrival WBC 16,000, Hgb 7.5, K 5.8, , Cr 10.9, Phos 6.0, CCa 10.8, trop 2.715, BNP> 4900. EKG no STEMI.  CXR b/l pneumonia and pulmonary edema.    Nephrology consulted for hemodialysis.    Prior records obtained and reviewed.  He receives HD MWF via RUE AVF at [facility] under the c/o Dr. Bryant. Treatment duration: 4 hours. EDW 70.5. He last received HD on Friday, he states less fluid was removed, due to hypotension. Pt c/o sob during my assessment, no other complaints. Dialysis consent obtained by Dr Baker.    Past Medical History:   Diagnosis Date    CAD (coronary artery disease), native coronary artery 11/21/2019    Cataract     Diabetes mellitus     Diabetic retinopathy     Dialysis patient     DM type 2 causing renal disease, not at goal     ESRD (end stage renal disease) started dialysis 01/2014 06/05/2014    H/o acute coronary syndrome with high troponin 03/11/2024    History of colon polyps  02/10/2021    History of COVID-19 12/29/2022    Hyperparathyroidism, secondary renal 06/05/2014    Hypertension     Hypertensive emergency 11/27/2023    NSTEMI (non-ST elevated myocardial infarction) 12/21/2013    Organ transplant candidate 06/05/2014    Palliative care encounter 10/29/2024    Pleural effusion 06/07/2024    Pneumonia     Post PTCA 08/26/2020    RCA [facility] 7-25-20    Renal cell carcinoma of right kidney     Renal hypertension     Stroke        Past Surgical History:   Procedure Laterality Date    ANGIOGRAM, CORONARY, WITH LEFT HEART CATHETERIZATION N/A 7/1/2021    Procedure: Angiogram, Coronary, with Left Heart Cath;  Surgeon: Miki Zendejas MD;  Location: [facility] CATH LAB;  Service: Cardiology;  Laterality: N/A;    ANGIOGRAM, CORONARY, WITH LEFT HEART CATHETERIZATION N/A 11/28/2023    Procedure: Angiogram, Coronary, with Left Heart Cath;  Surgeon: Noah Pendleton MD;  Location: [facility] CATH LAB;  Service: Cardiology;  Laterality: N/A;    COLONOSCOPY N/A 5/3/2017    Procedure: COLONOSCOPY;  Surgeon: Rufino Carpenter MD;  [facility]  Service: Endoscopy;  Laterality: N/A;  pt states can only schedule on Wednesdays    COLONOSCOPY N/A 2/9/2021    Procedure: COLONOSCOPY;  Surgeon: Edil Wong MD;  [facility]  Service: Endoscopy;  Laterality: N/A;  Dialysis MWF/ labwork day of procedure  right arm aceess  per Dr. Colin pt can hold Plavix 5 days prior see note- sm  COVID test on 2/6/21 at [facility] -     COLONOSCOPY N/A 2/10/2021    Procedure: COLONOSCOPY;  Surgeon: Robert Ayers MD;  [facility]  Service: Endoscopy;  Laterality: N/A;  rescheduled due to poor bowel prep-BB  negative covid screening 2/6/21-BB  dialysis M-W-F-BB  okay to r/s for 2/9/21 and to hold Plavix per Dr. KIRAN Wong-BB  labs same day-BB    CORONARY ANGIOGRAPHY N/A 2/10/2025    Procedure: ANGIOGRAM, CORONARY ARTERY;  Surgeon: Ajith Hutchins MD;  Location: [facility] CATH LAB;   Service: Cardiology;  Laterality: N/A;    CORONARY STENT PLACEMENT N/A 7/25/2019    Procedure: INSERTION, STENT, CORONARY ARTERY;  Surgeon: Miki Zendejas MD;  Location: [facility] CATH LAB;  Service: Cardiology;  Laterality: N/A;    DIALYSIS FISTULA CREATION      FISTULOGRAM N/A 2/18/2019    Procedure: Fistulogram;  Surgeon: Yaneth De La Cruz MD;  Location: [facility] CATH LAB;  Service: Cardiology;  Laterality: N/A;    FISTULOGRAM Right 7/23/2019    Procedure: Fistulogram;  Surgeon: Oz Cordoba MD;  Location: [facility] CATH LAB;  Service: Cardiology;  Laterality: Right;    LAPAROSCOPIC ROBOT-ASSISTED SURGICAL REMOVAL OF KIDNEY USING DA JAIME XI Right 5/19/2022    Procedure: XI ROBOTIC NEPHRECTOMY;  Surgeon: Aidan Hendricks MD;  [address]  Service: Urology;  Laterality: Right;  3hrs    LAPAROSCOPIC ROBOT-ASSISTED SURGICAL REMOVAL OF KIDNEY USING DA JAIME XI Left 1/5/2023    Procedure: XI ROBOTIC NEPHRECTOMY;  Surgeon: Aidan Hendricks MD;  [address]  Service: Urology;  Laterality: Left;  4 hrs    LEFT HEART CATHETERIZATION Left 7/25/2019    Procedure: Left heart cath;  Surgeon: Miki Zendejas MD;  Location: [facility] CATH LAB;  Service: Cardiology;  Laterality: Left;    LEFT HEART CATHETERIZATION Left 2/10/2025    Procedure: Left heart cath;  Surgeon: Ajith Hutchins MD;  Location: [facility] CATH LAB;  Service: Cardiology;  Laterality: Left;    REPAIR  1/5/2023    Procedure: REPAIR; COLOTOMY;  Surgeon: Maikel Lamb MD;  [address];  Service: General;;    RETINAL LASER PROCEDURE Bilateral 2018 or 2017    Dr. Rothman    VASCULAR SURGERY         Review of patient's allergies indicates:   Allergen Reactions    No known allergies      Current Facility-Administered Medications   Medication Frequency    acetaminophen tablet 325 mg Q6H PRN    benzonatate capsule 100 mg TID PRN    calcium carbonate 200 mg calcium (500 mg) chewable tablet 500 mg TID PRN    dextrose 50%  injection 12.5 g PRN    dextrose 50% injection 25 g PRN    diphenhydrAMINE capsule 25 mg Q6H PRN    glucagon (human recombinant) injection 1 mg PRN    glucose chewable tablet 16 g PRN    glucose chewable tablet 24 g PRN    guaiFENesin 100 mg/5 ml syrup 200 mg Q4H PRN    heparin (porcine) injection 5,000 Units Q8H    insulin aspart U-100 pen 0-5 Units QID (AC + HS) PRN    melatonin tablet 6 mg Nightly PRN    ondansetron disintegrating tablet 4 mg Q6H PRN    polyethylene glycol packet 17 g Daily PRN    promethazine (PHENERGAN) 25 mg in 0.9% NaCl 50 mL IVPB Q6H PRN    simethicone chewable tablet 80 mg TID PRN    sodium chloride 0.9% flush 10 mL PRN     Current Outpatient Medications   Medication    aspirin (ECOTRIN) 81 MG EC tablet    carvediloL (COREG) 25 MG tablet    clopidogreL (PLAVIX) 75 mg tablet    ezetimibe (ZETIA) 10 mg tablet    hydrALAZINE (APRESOLINE) 25 MG tablet    isosorbide mononitrate (IMDUR) 120 MG 24 hr tablet    NIFEdipine (PROCARDIA-XL) 60 MG (OSM) 24 hr tablet    nitroGLYCERIN (NITROSTAT) 0.4 MG SL tablet    ranolazine (RANEXA) 500 MG Tb12    sevelamer carbonate (RENVELA) 800 mg Tab    atorvastatin (LIPITOR) 80 MG tablet    benzonatate (TESSALON) 100 MG capsule     Facility-Administered Medications Ordered in Other Encounters   Medication Frequency    lidocaine (PF) 10 mg/ml (1%) injection 10 mg Once     Family History       Problem Relation (Age of Onset)    Cancer Maternal Grandfather    Cataracts Mother    Diabetes Mother    Heart disease Brother    Hypertension Mother, Father, Sister, Brother    Kidney disease Brother          Tobacco Use    Smoking status: Never    Smokeless tobacco: Never   Substance and Sexual Activity    Alcohol use: Not Currently     Comment: One drink a month    Drug use: No    Sexual activity: Yes     Partners: Female     Birth control/protection: None     Review of Systems   Constitutional:  Negative for fever.   HENT:  Negative for congestion.    Respiratory:   "Positive for cough and shortness of breath.    Cardiovascular:  Negative for chest pain.   Gastrointestinal:  Negative for abdominal pain.   Genitourinary:  Positive for decreased urine volume.   Musculoskeletal:  Negative for back pain.   Neurological:  Negative for headaches.   Hematological:  Negative for adenopathy.   Psychiatric/Behavioral:  Negative for behavioral problems.      Objective:     Vital Signs (Most Recent):  Temp: 97.8 °F (36.6 °C) (03/17/25 0838)  Pulse: 88 (03/17/25 1200)  Resp: (!) 34 (03/17/25 1200)  BP: 130/83 (03/17/25 1200)  SpO2: 96 % (03/17/25 1200) Vital Signs (24h Range):  Temp:  [97.8 °F (36.6 °C)] 97.8 °F (36.6 °C)  Pulse:  [87-92] 88  Resp:  [9-34] 34  SpO2:  [92 %-99 %] 96 %  BP: (123-141)/(77-94) 130/83     Weight: 72.5 kg (159 lb 14.4 oz) (03/17/25 1100)  Body mass index is 25.04 kg/m².  Body surface area is 1.85 meters squared.    No intake/output data recorded.     Physical Exam  Vitals and nursing note reviewed.   Constitutional:       Appearance: Normal appearance.   HENT:      Head: Normocephalic.   Cardiovascular:      Rate and Rhythm: Normal rate.      Pulses: Normal pulses.      Comments: + RUE AVF, + thrill  Pulmonary:      Breath sounds: Rhonchi present.      Comments: On 3L of O2 NC  Abdominal:      General: There is no distension.      Palpations: Abdomen is soft.      Tenderness: There is no abdominal tenderness.   Musculoskeletal:      Cervical back: Normal range of motion.      Right lower leg: Edema (1+) present.      Left lower leg: Edema (1+) present.   Neurological:      Mental Status: He is alert.   Psychiatric:         Mood and Affect: Mood normal.          Significant Labs:  Cardiac Markers: No results for input(s): "CKMB", "TROPONINT", "MYOGLOBIN" in the last 168 hours.  CBC:   Recent Labs   Lab 03/17/25  0909   WBC 16.35*   RBC 2.60*   HGB 7.5*   HCT 23.5*      MCV 90   MCH 28.8   MCHC 31.9*     CMP:   Recent Labs   Lab 03/17/25  0909   * "   CALCIUM 9.6   ALBUMIN 2.5*   PROT 8.0      K 5.8*   CO2 18*      *   CREATININE 10.9*   ALKPHOS 115   ALT 99*   AST 89*   BILITOT 1.1*     All labs within the past 24 hours have been reviewed.    Significant Imaging:  ECG: Reviewed  X-Ray: Reviewed  Echo: Reviewed    Assessment/Plan:     ESRD on HD  - HD MWF via RUE AVF at [facility] under the c/o Dr. Bryant  - BNP > 4900, CXR + pulmonary edema. Pt appears hypervolemic and is requiring 3L of O2, not on home O2.   - plan for HD today, goal UF 3L  - daily labs    Hyperkalemia  - K 5.8 on arrival   - HD above    Anemia of CKD   - Hgb 7.5, below goal  - epogen with HD today  - will trend    Secondary HPTH  - CCa 10.8, above goal. HD above  - Phos 6.0, above goal  - sevelamer 1600mg tid with meals  - renal diet        Thank you for your consult. I will follow-up with patient. Please contact us if you have any additional questions.    SAFIA Elliott  Nephrology  [facility] [place] [facility] Dept

## 2025-03-17 NOTE — ASSESSMENT & PLAN NOTE
Creatine stable for now. BMP reviewed- noted Estimated Creatinine Clearance: 7.8 mL/min (A) (based on SCr of 10.9 mg/dL (H)). according to latest data. Based on current GFR, CKD stage is end stage.  Monitor UOP and serial BMP and adjust therapy as needed. Renally dose meds. Avoid nephrotoxic medications and procedures.  Nephrology consulted. Plan for HD today.

## 2025-03-18 ENCOUNTER — DOCUMENTATION ONLY (OUTPATIENT)
Facility: CLINIC | Age: 50
End: 2025-03-18
Payer: MEDICARE

## 2025-03-18 VITALS
SYSTOLIC BLOOD PRESSURE: 111 MMHG | DIASTOLIC BLOOD PRESSURE: 74 MMHG | HEIGHT: 67 IN | WEIGHT: 159.88 LBS | TEMPERATURE: 98 F | RESPIRATION RATE: 34 BRPM | OXYGEN SATURATION: 92 % | BODY MASS INDEX: 25.09 KG/M2 | HEART RATE: 82 BPM

## 2025-03-18 LAB
ALBUMIN SERPL BCP-MCNC: 2.3 G/DL (ref 3.5–5.2)
ALP SERPL-CCNC: 108 U/L (ref 40–150)
ALT SERPL W/O P-5'-P-CCNC: 85 U/L (ref 10–44)
ANION GAP SERPL CALC-SCNC: 13 MMOL/L (ref 8–16)
AST SERPL-CCNC: 47 U/L (ref 10–40)
BASOPHILS # BLD AUTO: 0.17 K/UL (ref 0–0.2)
BASOPHILS NFR BLD: 1.2 % (ref 0–1.9)
BILIRUB SERPL-MCNC: 1.1 MG/DL (ref 0.1–1)
BUN SERPL-MCNC: 65 MG/DL (ref 6–20)
CALCIUM SERPL-MCNC: 8.8 MG/DL (ref 8.7–10.5)
CHLORIDE SERPL-SCNC: 99 MMOL/L (ref 95–110)
CO2 SERPL-SCNC: 25 MMOL/L (ref 23–29)
CREAT SERPL-MCNC: 7.4 MG/DL (ref 0.5–1.4)
DIFFERENTIAL METHOD BLD: ABNORMAL
EOSINOPHIL # BLD AUTO: 0.9 K/UL (ref 0–0.5)
EOSINOPHIL NFR BLD: 6.5 % (ref 0–8)
ERYTHROCYTE [DISTWIDTH] IN BLOOD BY AUTOMATED COUNT: 16.6 % (ref 11.5–14.5)
EST. GFR  (NO RACE VARIABLE): 8 ML/MIN/1.73 M^2
GLUCOSE SERPL-MCNC: 140 MG/DL (ref 70–110)
HCT VFR BLD AUTO: 25.4 % (ref 40–54)
HGB BLD-MCNC: 8.1 G/DL (ref 14–18)
IMM GRANULOCYTES # BLD AUTO: 0.08 K/UL (ref 0–0.04)
IMM GRANULOCYTES NFR BLD AUTO: 0.6 % (ref 0–0.5)
LYMPHOCYTES # BLD AUTO: 1.6 K/UL (ref 1–4.8)
LYMPHOCYTES NFR BLD: 11.6 % (ref 18–48)
MAGNESIUM SERPL-MCNC: 1.8 MG/DL (ref 1.6–2.6)
MCH RBC QN AUTO: 29.2 PG (ref 27–31)
MCHC RBC AUTO-ENTMCNC: 31.9 G/DL (ref 32–36)
MCV RBC AUTO: 92 FL (ref 82–98)
MONOCYTES # BLD AUTO: 0.8 K/UL (ref 0.3–1)
MONOCYTES NFR BLD: 6.1 % (ref 4–15)
NEUTROPHILS # BLD AUTO: 10.1 K/UL (ref 1.8–7.7)
NEUTROPHILS NFR BLD: 74 % (ref 38–73)
NRBC BLD-RTO: 0 /100 WBC
OHS QRS DURATION: 146 MS
OHS QRS DURATION: 148 MS
OHS QTC CALCULATION: 486 MS
OHS QTC CALCULATION: 494 MS
PHOSPHATE SERPL-MCNC: 4.7 MG/DL (ref 2.7–4.5)
PLATELET # BLD AUTO: 440 K/UL (ref 150–450)
PMV BLD AUTO: 9.8 FL (ref 9.2–12.9)
POTASSIUM SERPL-SCNC: 4.4 MMOL/L (ref 3.5–5.1)
PROT SERPL-MCNC: 7.4 G/DL (ref 6–8.4)
RBC # BLD AUTO: 2.77 M/UL (ref 4.6–6.2)
SODIUM SERPL-SCNC: 137 MMOL/L (ref 136–145)
WBC # BLD AUTO: 13.7 K/UL (ref 3.9–12.7)

## 2025-03-18 PROCEDURE — 85025 COMPLETE CBC W/AUTO DIFF WBC: CPT | Mod: HCNC

## 2025-03-18 PROCEDURE — 84100 ASSAY OF PHOSPHORUS: CPT | Mod: HCNC

## 2025-03-18 PROCEDURE — 25000003 PHARM REV CODE 250: Mod: HCNC | Performed by: PHYSICIAN ASSISTANT

## 2025-03-18 PROCEDURE — 96372 THER/PROPH/DIAG INJ SC/IM: CPT

## 2025-03-18 PROCEDURE — 80053 COMPREHEN METABOLIC PANEL: CPT | Mod: HCNC

## 2025-03-18 PROCEDURE — G0378 HOSPITAL OBSERVATION PER HR: HCPCS | Mod: HCNC

## 2025-03-18 PROCEDURE — 96366 THER/PROPH/DIAG IV INF ADDON: CPT | Mod: HCNC

## 2025-03-18 PROCEDURE — 63600175 PHARM REV CODE 636 W HCPCS: Mod: HCNC

## 2025-03-18 PROCEDURE — 83735 ASSAY OF MAGNESIUM: CPT | Mod: HCNC

## 2025-03-18 PROCEDURE — 25000003 PHARM REV CODE 250: Mod: HCNC

## 2025-03-18 RX ORDER — BENZONATATE 100 MG/1
100 CAPSULE ORAL 3 TIMES DAILY PRN
Status: DISCONTINUED | OUTPATIENT
Start: 2025-03-18 | End: 2025-03-18 | Stop reason: HOSPADM

## 2025-03-18 RX ORDER — NIFEDIPINE 60 MG/1
60 TABLET, EXTENDED RELEASE ORAL DAILY
Qty: 30 TABLET | Refills: 1 | Status: SHIPPED | OUTPATIENT
Start: 2025-03-18 | End: 2026-03-18

## 2025-03-18 RX ADMIN — ASPIRIN 81 MG: 81 TABLET, COATED ORAL at 08:03

## 2025-03-18 RX ADMIN — ISOSORBIDE MONONITRATE 120 MG: 30 TABLET, EXTENDED RELEASE ORAL at 08:03

## 2025-03-18 RX ADMIN — SEVELAMER CARBONATE 1600 MG: 800 TABLET, FILM COATED ORAL at 08:03

## 2025-03-18 RX ADMIN — CARVEDILOL 25 MG: 12.5 TABLET, FILM COATED ORAL at 08:03

## 2025-03-18 RX ADMIN — CLOPIDOGREL BISULFATE 75 MG: 75 TABLET ORAL at 08:03

## 2025-03-18 RX ADMIN — GUAIFENESIN 200 MG: 200 SOLUTION ORAL at 06:03

## 2025-03-18 RX ADMIN — NIFEDIPINE 60 MG: 30 TABLET, FILM COATED, EXTENDED RELEASE ORAL at 08:03

## 2025-03-18 RX ADMIN — HEPARIN SODIUM 5000 UNITS: 5000 INJECTION INTRAVENOUS; SUBCUTANEOUS at 06:03

## 2025-03-18 RX ADMIN — PIPERACILLIN SODIUM AND TAZOBACTAM SODIUM 4.5 G: 4; .5 INJECTION, POWDER, FOR SOLUTION INTRAVENOUS at 08:03

## 2025-03-18 RX ADMIN — EZETIMIBE 10 MG: 10 TABLET ORAL at 08:03

## 2025-03-18 NOTE — SUBJECTIVE & OBJECTIVE
Interval History: some difficulty with access yesterday, was eventually able to establish flows  Tolerated dialysis well yesterday    Review of patient's allergies indicates:   Allergen Reactions    No known allergies      Current Facility-Administered Medications   Medication Frequency    acetaminophen tablet 325 mg Q6H PRN    aspirin EC tablet 81 mg Daily    benzonatate capsule 100 mg TID PRN    calcium carbonate 200 mg calcium (500 mg) chewable tablet 500 mg TID PRN    carvediloL tablet 25 mg BID WM    clopidogreL tablet 75 mg Daily    dextrose 50% injection 12.5 g PRN    dextrose 50% injection 25 g PRN    diphenhydrAMINE capsule 25 mg Q6H PRN    ezetimibe tablet 10 mg Daily    glucagon (human recombinant) injection 1 mg PRN    glucose chewable tablet 16 g PRN    glucose chewable tablet 24 g PRN    guaiFENesin 100 mg/5 ml syrup 200 mg Q4H PRN    heparin (porcine) injection 5,000 Units Q8H    insulin aspart U-100 pen 0-5 Units QID (AC + HS) PRN    isosorbide mononitrate 24 hr tablet 120 mg Daily    melatonin tablet 6 mg Nightly PRN    NIFEdipine 24 hr tablet 60 mg BID    ondansetron disintegrating tablet 4 mg Q6H PRN    piperacillin-tazobactam (ZOSYN) 4.5 g in D5W 100 mL IVPB (MB+) Q12H    polyethylene glycol packet 17 g Daily PRN    promethazine (PHENERGAN) 25 mg in 0.9% NaCl 50 mL IVPB Q6H PRN    ranolazine 12 hr tablet 500 mg Daily    sevelamer carbonate tablet 1,600 mg TID WM    simethicone chewable tablet 80 mg TID PRN    sodium chloride 0.9% flush 10 mL PRN     Current Outpatient Medications   Medication    aspirin (ECOTRIN) 81 MG EC tablet    carvediloL (COREG) 25 MG tablet    clopidogreL (PLAVIX) 75 mg tablet    ezetimibe (ZETIA) 10 mg tablet    hydrALAZINE (APRESOLINE) 25 MG tablet    isosorbide mononitrate (IMDUR) 120 MG 24 hr tablet    nitroGLYCERIN (NITROSTAT) 0.4 MG SL tablet    ranolazine (RANEXA) 500 MG Tb12    sevelamer carbonate (RENVELA) 800 mg Tab    atorvastatin (LIPITOR) 80 MG tablet     benzonatate (TESSALON) 100 MG capsule    NIFEdipine (PROCARDIA-XL) 60 MG (OSM) 24 hr tablet     Facility-Administered Medications Ordered in Other Encounters   Medication Frequency    lidocaine (PF) 10 mg/ml (1%) injection 10 mg Once       Objective:     Vital Signs (Most Recent):  Temp: 97.6 °F (36.4 °C) (03/18/25 0638)  Pulse: 82 (03/18/25 0800)  Resp: (!) 34 (03/18/25 0800)  BP: 111/74 (03/18/25 0700)  SpO2: (!) 92 % (03/18/25 0800) Vital Signs (24h Range):  Temp:  [97.6 °F (36.4 °C)-97.9 °F (36.6 °C)] 97.6 °F (36.4 °C)  Pulse:  [76-91] 82  Resp:  [15-35] 34  SpO2:  [88 %-98 %] 92 %  BP: (106-131)/(55-87) 111/74     Weight: 72.5 kg (159 lb 14.4 oz) (03/17/25 1100)  Body mass index is 25.04 kg/m².  Body surface area is 1.85 meters squared.    I/O last 3 completed shifts:  In: -   Out: 3500 [Other:3500]     Physical Exam  Constitutional:       General: He is not in acute distress.     Appearance: He is not ill-appearing or toxic-appearing.   HENT:      Head: Normocephalic and atraumatic.      Nose: Nose normal. No congestion.      Mouth/Throat:      Mouth: Mucous membranes are moist.   Cardiovascular:      Rate and Rhythm: Normal rate.      Comments: Left arm AVF  Pulmonary:      Effort: Pulmonary effort is normal. No respiratory distress.      Breath sounds: Rhonchi (fine crackles at bases) present. No wheezing or rales.   Musculoskeletal:      Right lower leg: No edema.      Left lower leg: No edema.   Neurological:      Mental Status: He is alert.          Significant Labs:  All labs within the past 24 hours have been reviewed.     Significant Imaging:  Labs: Reviewed

## 2025-03-18 NOTE — ASSESSMENT & PLAN NOTE
Patient has Diastolic (HFpEF) heart failure that is Acute on chronic. On presentation their CHF was decompensated. Evidence of decompensated CHF on presentation includes: edema, orthopnea, dyspnea on exertion (OSEGUERA), and shortness of breath. The etiology of their decompensation is likely shortened HD treatment on 03/14. Most recent BNP and echo results are listed below.  Recent Labs     03/17/25  0909   BNP >4,900*     Latest ECHO  Results for orders placed during the hospital encounter of 02/09/25    Echo    Interpretation Summary    Left Ventricle: The left ventricle is mildly dilated. Mildly increased wall thickness. There is mild concentric hypertrophy. There is low normal systolic function with a visually estimated ejection fraction of 50 - 55%. Grade II diastolic dysfunction.    Right Ventricle: Normal right ventricular cavity size. Systolic function is normal.    Left Atrium: Left atrium is mildly dilated.    Aortic Valve: The aortic valve is a trileaflet valve. There is moderate aortic valve sclerosis.    Mitral Valve: There is mild to moderate regurgitation.    Tricuspid Valve: There is mild regurgitation.    Current Heart Failure Medications  carvediloL tablet 25 mg, 2 times daily with meals, Oral    Plan  - Monitor strict I&Os and daily weights.    - Place on telemetry  - Low sodium diet  - Place on fluid restriction of 1.5 L.   - Cardiology has not been consulted  - The patient's volume status is stable but not at their baseline as indicated by edema, orthopnea, dyspnea on exertion (OSEGUERA), and shortness of breath  - HD for volume management. Will not initiate Lasix as patient is s/p bilateral nephrectomy.

## 2025-03-18 NOTE — HPI
"From H&P "50 yo M with HTN, T2DM, prior CVA with left sided hemiparesis, multi-vessel CAD non-amenable to revascularization by PCI or CABG, HFpEF, anuric ESRD HD MWF, RCC s/p bilateral nephrectomy presented to the ED with chief complaint of SOB. Patient reports the symptoms started approximately 1 week ago after missing two consecutive HD appointments due to an acute illness. He presented to [facility] on 03/10 for evaluation and underwent urgent HD. While admitted, patient was also started on empiric therapy for CAP given clinical presentation and CT findings which could not rule-out an infectious process. He was discharged on Cefdinir + Doxycycline, though reports he stopped taking them after about two days as they weren't helping. He reports a persistent non-productive cough as well as congestion and rhinorrhea. He denies fever, chills, myalgias, n/v/d. Patient's last HD session was on Friday 3/14, however, had to be cut short d/t hypotension. "    Palliative medicine consulted for goals of care discussion and advance care planning; for details of visit, see advance care planning section of plan.         "

## 2025-03-18 NOTE — ASSESSMENT & PLAN NOTE
- 2/25 echo: 50 - 55%, G2DD and mult valve concerns   - hospice qualifying if/when aligns with GOC   - cont mgmt per HM

## 2025-03-18 NOTE — SUBJECTIVE & OBJECTIVE
Past Medical History:   Diagnosis Date    CAD (coronary artery disease), native coronary artery 11/21/2019    Cataract     Diabetes mellitus     Diabetic retinopathy     Dialysis patient     DM type 2 causing renal disease, not at goal     ESRD (end stage renal disease) started dialysis 01/2014 06/05/2014    H/o acute coronary syndrome with high troponin 03/11/2024    History of colon polyps 02/10/2021    History of COVID-19 12/29/2022    Hyperparathyroidism, secondary renal 06/05/2014    Hypertension     Hypertensive emergency 11/27/2023    NSTEMI (non-ST elevated myocardial infarction) 12/21/2013    Organ transplant candidate 06/05/2014    Palliative care encounter 10/29/2024    Pleural effusion 06/07/2024    Pneumonia     Post PTCA 08/26/2020    RCA HILLARY 7-25-20    Renal cell carcinoma of right kidney     Renal hypertension     Stroke      Past Surgical History:   Procedure Laterality Date    ANGIOGRAM, CORONARY, WITH LEFT HEART CATHETERIZATION N/A 7/1/2021    Procedure: Angiogram, Coronary, with Left Heart Cath;  Surgeon: Miki Zendejas MD;  Location: [facility] CATH LAB;  Service: Cardiology;  Laterality: N/A;    ANGIOGRAM, CORONARY, WITH LEFT HEART CATHETERIZATION N/A 11/28/2023    Procedure: Angiogram, Coronary, with Left Heart Cath;  Surgeon: Noah Pendleton MD;  Location: [facility] CATH LAB;  Service: Cardiology;  Laterality: N/A;    COLONOSCOPY N/A 5/3/2017    Procedure: COLONOSCOPY;  Surgeon: Rufino Carpenter MD;  Location: [address]);  Service: Endoscopy;  Laterality: N/A;  pt states can only schedule on Wednesdays    COLONOSCOPY N/A 2/9/2021    Procedure: COLONOSCOPY;  Surgeon: Edil Wong MD;  Location: [address]);  Service: Endoscopy;  Laterality: N/A;  Dialysis MWF/ labwork day of procedure  right arm aceess  per Dr. Colin pt can hold Plavix 5 days prior see note- sm  COVID test on 2/6/21 at [facility] - sm    COLONOSCOPY N/A 2/10/2021    Procedure: COLONOSCOPY;  Surgeon: Robert Ayers MD;   Location: [facility] ENDO (4TH FLR);  Service: Endoscopy;  Laterality: N/A;  rescheduled due to poor bowel prep-BB  negative covid screening 2/6/21-BB  dialysis M-W-F-BB  okay to r/s for 2/9/21 and to hold Plavix per Dr. KIRAN Wong-BB  labs same day-BB    CORONARY ANGIOGRAPHY N/A 2/10/2025    Procedure: ANGIOGRAM, CORONARY ARTERY;  Surgeon: Ajith Hutchins MD;  Location: [facility] CATH LAB;  Service: Cardiology;  Laterality: N/A;    CORONARY STENT PLACEMENT N/A 7/25/2019    Procedure: INSERTION, STENT, CORONARY ARTERY;  Surgeon: Miki Zendejas MD;  Location: [facility] CATH LAB;  Service: Cardiology;  Laterality: N/A;    DIALYSIS FISTULA CREATION      FISTULOGRAM N/A 2/18/2019    Procedure: Fistulogram;  Surgeon: Yaneth De La Cruz MD;  Location: [facility] CATH LAB;  Service: Cardiology;  Laterality: N/A;    FISTULOGRAM Right 7/23/2019    Procedure: Fistulogram;  Surgeon: Oz Cordoba MD;  Location: [facility] CATH LAB;  Service: Cardiology;  Laterality: Right;    LAPAROSCOPIC ROBOT-ASSISTED SURGICAL REMOVAL OF KIDNEY USING DA JAIME XI Right 5/19/2022    Procedure: XI ROBOTIC NEPHRECTOMY;  Surgeon: Aidan Hendricks MD;  [address]  Service: Urology;  Laterality: Right;  3hrs    LAPAROSCOPIC ROBOT-ASSISTED SURGICAL REMOVAL OF KIDNEY USING DA JAIME XI Left 1/5/2023    Procedure: XI ROBOTIC NEPHRECTOMY;  Surgeon: Aidan Hendricks MD;  [address];  Service: Urology;  Laterality: Left;  4 hrs    LEFT HEART CATHETERIZATION Left 7/25/2019    Procedure: Left heart cath;  Surgeon: Miki Zendejas MD;  Location: [facility] CATH LAB;  Service: Cardiology;  Laterality: Left;    LEFT HEART CATHETERIZATION Left 2/10/2025    Procedure: Left heart cath;  Surgeon: Ajith Hutchins MD;  Location: [facility] CATH LAB;  Service: Cardiology;  Laterality: Left;    REPAIR  1/5/2023    Procedure: REPAIR; COLOTOMY;  Surgeon: Maikel Lamb MD;  Location: [facility] 2ND FLR;  Service: General;;    RETINAL LASER PROCEDURE  Bilateral 2018 or 2017    Dr. Rothman    VASCULAR SURGERY       Review of patient's allergies indicates:   Allergen Reactions    No known allergies      Medications:  Continuous Infusions:  Scheduled Meds:   aspirin  81 mg Oral Daily    carvediloL  25 mg Oral BID WM    clopidogreL  75 mg Oral Daily    ezetimibe  10 mg Oral Daily    heparin (porcine)  5,000 Units Subcutaneous Q8H    isosorbide mononitrate  120 mg Oral Daily    NIFEdipine  60 mg Oral BID    piperacillin-tazobactam (Zosyn) IV (PEDS and ADULTS) (extended infusion is not appropriate)  4.5 g Intravenous Q12H    ranolazine  500 mg Oral Daily    sevelamer carbonate  1,600 mg Oral TID WM     PRN Meds:  Current Facility-Administered Medications:     acetaminophen, 325 mg, Oral, Q6H PRN    benzonatate, 100 mg, Oral, TID PRN    calcium carbonate, 500 mg, Oral, TID PRN    dextrose 50%, 12.5 g, Intravenous, PRN    dextrose 50%, 25 g, Intravenous, PRN    diphenhydrAMINE, 25 mg, Oral, Q6H PRN    glucagon (human recombinant), 1 mg, Intramuscular, PRN    glucose, 16 g, Oral, PRN    glucose, 24 g, Oral, PRN    guaiFENesin 100 mg/5 ml, 200 mg, Oral, Q4H PRN    insulin aspart U-100, 0-5 Units, Subcutaneous, QID (AC + HS) PRN    melatonin, 6 mg, Oral, Nightly PRN    ondansetron, 4 mg, Oral, Q6H PRN    polyethylene glycol, 17 g, Oral, Daily PRN    promethazine (PHENERGAN) 25 mg in 0.9% NaCl 50 mL IVPB, 25 mg, Intravenous, Q6H PRN    simethicone, 1 tablet, Oral, TID PRN    sodium chloride 0.9%, 10 mL, Intravenous, PRN    Family History       Problem Relation (Age of Onset)    Cancer Maternal Grandfather    Cataracts Mother    Diabetes Mother    Heart disease Brother    Hypertension Mother, Father, Sister, Brother    Kidney disease Brother          Tobacco Use    Smoking status: Never    Smokeless tobacco: Never   Substance and Sexual Activity    Alcohol use: Not Currently     Comment: One drink a month    Drug use: No    Sexual activity: Yes     Partners: Female     Birth  control/protection: None       Review of Systems  Objective:     Vital Signs (Most Recent):  Temp: 97.6 °F (36.4 °C) (03/18/25 0638)  Pulse: 82 (03/18/25 0800)  Resp: (!) 34 (03/18/25 0800)  BP: 111/74 (03/18/25 0700)  SpO2: (!) 92 % (03/18/25 0800) Vital Signs (24h Range):  Temp:  [97.6 °F (36.4 °C)-97.9 °F (36.6 °C)] 97.6 °F (36.4 °C)  Pulse:  [76-91] 82  Resp:  [9-35] 34  SpO2:  [88 %-98 %] 92 %  BP: (106-131)/(55-87) 111/74     Weight: 72.5 kg (159 lb 14.4 oz)  Body mass index is 25.04 kg/m².       Physical Exam     Advance Care Planning   Advance Directives:   Living Will: No    LaPOST: No    Do Not Resuscitate Status: No    Medical Power of : Yes    Agent's Name:  Ariadne Dickinson   Agent's Contact Number:  [phone]  Goals of Care: What is most important right now is to focus on extending life as long as possible, even it it means sacrificing quality, curative/life-prolongation (regardless of treatment burdens). Accordingly, we have decided that the best plan to meet the patient's goals includes continuing with treatment.         Significant Labs: All pertinent labs within the past 24 hours have been reviewed.  CBC:   Recent Labs   Lab 03/18/25 0353   WBC 13.70*   HGB 8.1*   HCT 25.4*   MCV 92        BMP:  Recent Labs   Lab 03/18/25 0353   *      K 4.4   CL 99   CO2 25   BUN 65*   CREATININE 7.4*   CALCIUM 8.8   MG 1.8     LFT:  Lab Results   Component Value Date    AST 47 (H) 03/18/2025    ALKPHOS 108 03/18/2025    BILITOT 1.1 (H) 03/18/2025     Albumin:   Albumin   Date Value Ref Range Status   03/18/2025 2.3 (L) 3.5 - 5.2 g/dL Final     Protein:   Total Protein   Date Value Ref Range Status   03/18/2025 7.4 6.0 - 8.4 g/dL Final     Lactic acid:   Lab Results   Component Value Date    LACTATE 1.4 03/17/2025    LACTATE 0.6 06/07/2024     Significant Imaging: I have reviewed all pertinent imaging results/findings within the past 24 hours.

## 2025-03-18 NOTE — ASSESSMENT & PLAN NOTE
#Anemia  Hemoglobin   Date Value Ref Range Status   03/18/2025 8.1 (L) 14.0 - 18.0 g/dL Final     Iron   Date Value Ref Range Status   11/27/2023 104 45 - 160 ug/dL Final     Transferrin   Date Value Ref Range Status   11/27/2023 107 (L) 200 - 375 mg/dL Final     TIBC   Date Value Ref Range Status   11/27/2023 158 (L) 250 - 450 ug/dL Final     Saturated Iron   Date Value Ref Range Status   11/27/2023 66 (H) 20 - 50 % Final     Ferritin   Date Value Ref Range Status   11/27/2023 1,531 (H) 20.0 - 300.0 ng/mL Final     Follow up outpatient, would recommend repeat labs

## 2025-03-18 NOTE — ASSESSMENT & PLAN NOTE
ESRD on HD    Tolerated HD yesterday well  CXR much improved from yesterday and is breathing much more comfortably  Discussed with primary team and with patient - okay to discharge home from nephrology perspective and follow up at his usual dialysis session tomorrow      #Secondary hyperparathyroidism  Calcium   Date Value Ref Range Status   03/18/2025 8.8 8.7 - 10.5 mg/dL Final     Phosphorus   Date Value Ref Range Status   03/18/2025 4.7 (H) 2.7 - 4.5 mg/dL Final     PTH, Intact   Date Value Ref Range Status   03/14/2025 732 (H) 16 - 80 pg/mL Final   Continue home renvela with meals, defer rest of treatment to primary nephrologist

## 2025-03-18 NOTE — ASSESSMENT & PLAN NOTE
Anemia is likely due to chronic disease due to ESRD. Most recent hemoglobin and hematocrit are listed below.  Recent Labs     03/17/25  0909 03/18/25  0353   HGB 7.5* 8.1*   HCT 23.5* 25.4*     Plan  - Monitor serial CBC: Daily  - Transfuse PRBC if patient becomes hemodynamically unstable, symptomatic or H/H drops below 7/21.  - Patient has not received any PRBC transfusions to date  - Patient's anemia is currently stable

## 2025-03-18 NOTE — PROGRESS NOTES
[facility] [place], [place] Emergency Dept  Nephrology  Progress Note    Patient Name: Haroldo Dickinson  MRN: [id number]  Admission Date: 3/17/2025  Hospital Length of Stay: 0 days  Attending Provider: Makenzie Germain MD   Primary Care Physician: Mireya Larose MD  Principal Problem:Acute hypoxemic respiratory failure    Subjective:     HPI: 48 y/o M with a PMHx of ESRD on dialysis MWF, HFpEF ( 50-55% GD II DD), HTN, CAD, NSTEMI, stroke, renal cell carcinoma s/p nephrectomy, and hyperparathyroidism presents to the ED via EMS for evaluation of shortness of breath x 3 days. Pt recently diagnosed with pneumonia on 3/12/25 and prescribed zithromax. Pt states he was compliant with abx, with no relief. Pt admitted for sob, pulmonary edema, elevated troponin, and leukocytosis. Vitals on arrival /94, Hr 90bpm, RR 22, temp 97.8, O2 99% on 3L NC. Pt does not use home O2. Labs on arrival WBC 16,000, Hgb 7.5, K 5.8, , Cr 10.9, Phos 6.0, CCa 10.8, trop 2.715, BNP> 4900. EKG no STEMI.  CXR b/l pneumonia and pulmonary edema.    Nephrology consulted for hemodialysis.    Prior records obtained and reviewed.  He receives HD MWF via RUE AVF at [facility] under the c/o Dr. Bryant. Treatment duration: 4 hours. EDW 70.5. He last received HD on Friday, he states less fluid was removed, due to hypotension. Pt c/o sob during my assessment, no other complaints. Dialysis consent obtained by Dr Baker.    Interval History: some difficulty with access yesterday, was eventually able to establish flows  Tolerated dialysis well yesterday    Review of patient's allergies indicates:   Allergen Reactions    No known allergies      Current Facility-Administered Medications   Medication Frequency    acetaminophen tablet 325 mg Q6H PRN    aspirin EC tablet 81 mg Daily    benzonatate capsule 100 mg TID PRN    calcium carbonate 200 mg calcium (500 mg) chewable tablet 500 mg TID PRN    carvediloL tablet 25 mg BID WM    clopidogreL tablet 75 mg Daily    dextrose  50% injection 12.5 g PRN    dextrose 50% injection 25 g PRN    diphenhydrAMINE capsule 25 mg Q6H PRN    ezetimibe tablet 10 mg Daily    glucagon (human recombinant) injection 1 mg PRN    glucose chewable tablet 16 g PRN    glucose chewable tablet 24 g PRN    guaiFENesin 100 mg/5 ml syrup 200 mg Q4H PRN    heparin (porcine) injection 5,000 Units Q8H    insulin aspart U-100 pen 0-5 Units QID (AC + HS) PRN    isosorbide mononitrate 24 hr tablet 120 mg Daily    melatonin tablet 6 mg Nightly PRN    NIFEdipine 24 hr tablet 60 mg BID    ondansetron disintegrating tablet 4 mg Q6H PRN    piperacillin-tazobactam (ZOSYN) 4.5 g in D5W 100 mL IVPB (MB+) Q12H    polyethylene glycol packet 17 g Daily PRN    promethazine (PHENERGAN) 25 mg in 0.9% NaCl 50 mL IVPB Q6H PRN    ranolazine 12 hr tablet 500 mg Daily    sevelamer carbonate tablet 1,600 mg TID WM    simethicone chewable tablet 80 mg TID PRN    sodium chloride 0.9% flush 10 mL PRN     Current Outpatient Medications   Medication    aspirin (ECOTRIN) 81 MG EC tablet    carvediloL (COREG) 25 MG tablet    clopidogreL (PLAVIX) 75 mg tablet    ezetimibe (ZETIA) 10 mg tablet    hydrALAZINE (APRESOLINE) 25 MG tablet    isosorbide mononitrate (IMDUR) 120 MG 24 hr tablet    nitroGLYCERIN (NITROSTAT) 0.4 MG SL tablet    ranolazine (RANEXA) 500 MG Tb12    sevelamer carbonate (RENVELA) 800 mg Tab    atorvastatin (LIPITOR) 80 MG tablet    benzonatate (TESSALON) 100 MG capsule    NIFEdipine (PROCARDIA-XL) 60 MG (OSM) 24 hr tablet     Facility-Administered Medications Ordered in Other Encounters   Medication Frequency    lidocaine (PF) 10 mg/ml (1%) injection 10 mg Once       Objective:     Vital Signs (Most Recent):  Temp: 97.6 °F (36.4 °C) (03/18/25 0638)  Pulse: 82 (03/18/25 0800)  Resp: (!) 34 (03/18/25 0800)  BP: 111/74 (03/18/25 0700)  SpO2: (!) 92 % (03/18/25 0800) Vital Signs (24h Range):  Temp:  [97.6 °F (36.4 °C)-97.9 °F (36.6 °C)] 97.6 °F (36.4 °C)  Pulse:  [76-91] 82  Resp:   [15-35] 34  SpO2:  [88 %-98 %] 92 %  BP: (106-131)/(55-87) 111/74     Weight: 72.5 kg (159 lb 14.4 oz) (03/17/25 1100)  Body mass index is 25.04 kg/m².  Body surface area is 1.85 meters squared.    I/O last 3 completed shifts:  In: -   Out: 3500 [Other:3500]     Physical Exam  Constitutional:       General: He is not in acute distress.     Appearance: He is not ill-appearing or toxic-appearing.   HENT:      Head: Normocephalic and atraumatic.      Nose: Nose normal. No congestion.      Mouth/Throat:      Mouth: Mucous membranes are moist.   Cardiovascular:      Rate and Rhythm: Normal rate.      Comments: Left arm AVF  Pulmonary:      Effort: Pulmonary effort is normal. No respiratory distress.      Breath sounds: Rhonchi (fine crackles at bases) present. No wheezing or rales.   Musculoskeletal:      Right lower leg: No edema.      Left lower leg: No edema.   Neurological:      Mental Status: He is alert.          Significant Labs:  All labs within the past 24 hours have been reviewed.     Significant Imaging:  Labs: Reviewed  Assessment/Plan:     Renal/  ESRD on hemodialysis  ESRD on HD    Tolerated HD yesterday well  CXR much improved from yesterday and is breathing much more comfortably  Discussed with primary team and with patient - okay to discharge home from nephrology perspective and follow up at his usual dialysis session tomorrow      #Secondary hyperparathyroidism  Calcium   Date Value Ref Range Status   03/18/2025 8.8 8.7 - 10.5 mg/dL Final     Phosphorus   Date Value Ref Range Status   03/18/2025 4.7 (H) 2.7 - 4.5 mg/dL Final     PTH, Intact   Date Value Ref Range Status   03/14/2025 732 (H) 16 - 80 pg/mL Final   Continue home renvela with meals, defer rest of treatment to primary nephrologist      Oncology  Anemia in end-stage renal disease  #Anemia  Hemoglobin   Date Value Ref Range Status   03/18/2025 8.1 (L) 14.0 - 18.0 g/dL Final     Iron   Date Value Ref Range Status   11/27/2023 104 45 - 160  ug/dL Final     Transferrin   Date Value Ref Range Status   11/27/2023 107 (L) 200 - 375 mg/dL Final     TIBC   Date Value Ref Range Status   11/27/2023 158 (L) 250 - 450 ug/dL Final     Saturated Iron   Date Value Ref Range Status   11/27/2023 66 (H) 20 - 50 % Final     Ferritin   Date Value Ref Range Status   11/27/2023 1,531 (H) 20.0 - 300.0 ng/mL Final     Follow up outpatient, would recommend repeat labs        Thank you for your consult. I will follow-up with patient. Please contact us if you have any additional questions.    Indoi Baker MD  Nephrology  [facility] - [place] - Emergency Dept

## 2025-03-18 NOTE — HOSPITAL COURSE
Haroldo Dickinson 49 y.o. male placed in observation for shortness of breath.  He presented to the hospital shortness for breath after recent hospital discharge.  His chest x-ray had signs of volume overload.  He had recently had an incomplete dialysis session outpatient.  He is dialyzed with resolution of his shortness breath and improvement in chest x-ray findings.  There were initial difficulties and obtaining dialysis access, but he completed a full session while inpatient.  A dialysis access ultrasound was obtained she had issues persist in the future.  He is encouraged to complete his antibiotics from previous hospital discharge, as he reported he had not completed them yet.  Suspect his symptoms today are related to volume overload given the resolution with dialysis.  At discharge he is on room air and he will follow up with his outpatient Nephrologist for dialysis.

## 2025-03-18 NOTE — PROGRESS NOTES
[facility] ([facility]) Team notified of pt referral via [facility] (automated inbasket notification) .    Patient screened today3/18 by provider and RN for enrollment to program.      Pt was deemed not a candidate for enrollment at this time related to patient is currently on hemo-dialysis.    Pt will require additional follow up planning per primary team.     If pt status, diagnosis, or treatment plan changes , please place AMB referral to [facility] ([id number]) for HFT[facility] enrollment re-evalution.

## 2025-03-18 NOTE — ASSESSMENT & PLAN NOTE
Creatine stable for now. BMP reviewed- noted Estimated Creatinine Clearance: 11.5 mL/min (A) (based on SCr of 7.4 mg/dL (H)). according to latest data. Based on current GFR, CKD stage is end stage.  Monitor UOP and serial BMP and adjust therapy as needed. Renally dose meds. Avoid nephrotoxic medications and procedures.  Nephrology consulted. Plan for HD today.

## 2025-03-18 NOTE — ASSESSMENT & PLAN NOTE
3/18/2025 - Consult   - consult received; discussed pt with 3/17 Ortega TIRADO PA at time of consult   - interval chart reviewed in detail  - met with patient at bedside; introduction to palliative medicine team and role in current care and admission   - learned more about pt outside of current admission;   - GOC/ACP discussion;     - emotional support provided   - Allowed time for questions/concerns; all addressed; expressed availability of myself/palliative team for additional questions/concerns

## 2025-03-18 NOTE — ASSESSMENT & PLAN NOTE
- noted; missed HD prior to recent admission to [facility] due to illness   - discussed importance of HD schedule compliance   - continuing with HD as long as candidate aligns with GOC   - cont mgmt per HM and nephro

## 2025-03-18 NOTE — ASSESSMENT & PLAN NOTE
- pt recently admitted to [facility] for tx and elected to self stop home PO abx as pt felt they were not helping   - discussed importance of completing regimen   - cont mgmt per HM

## 2025-03-18 NOTE — ASSESSMENT & PLAN NOTE
Recently hospitalized at [facility] 03/10 - 03/12. CT Chest showed mild cardiomegaly with bilateral airspace disease with left upper and lower lobe consolidation, patchy infiltrate in the right and bilateral ground-glass infiltrates. Treated with Rocephin and Doxycycline for concerns of CAP. Was discharged on Doxycycline and Cefdinir.    Now with new-onset leukocytosis 16k. CXR Cardiomegaly with patchy interstitial and airspace opacities throughout both lungs, slightly increased in conspicuity from comparison CT 03/10/2025. Presentation more consistent with volume overload, however, will initiate treatment for PNA given leukocytosis and URI symptoms.    Procal ordered, however, often unreliable in ESRD.   RIP, Flu, COVID, sputum culture pending  Continue Zosyn for now. Deescalate as necessary pending cultures.   Supplemental O2 as needed

## 2025-03-18 NOTE — DISCHARGE SUMMARY
[facility] Emergency Dept  Hospital Medicine  Discharge Summary      Patient Name: Haroldo Dickinson  MRN: [id number]  SADIE: [id number]  Patient Class: OP- Observation  Admission Date: 3/17/2025  Hospital Length of Stay: 0 days  Discharge Date and Time:  03/18/2025 10:58 AM  Attending Physician: Makenzie Germain MD   Discharging Provider: Gregg Marks PA-C  Primary Care Provider: Mireya Larose MD    Primary Care Team: GREGG MARKS    HPI:   48 yo M with HTN, T2DM, prior CVA with left sided hemiparesis, multi-vessel CAD non-amenable to revascularization by PCI or CABG, HFpEF, anuric ESRD HD MWF, RCC s/p bilateral nephrectomy presented to the ED with chief complaint of SOB. Patient reports the symptoms started approximately 1 week ago after missing two consecutive HD appointments due to an acute illness. He presented to [facility] on 03/10 for evaluation and underwent urgent HD. While admitted, patient was also started on empiric therapy for CAP given clinical presentation and CT findings which could not rule-out an infectious process. He was discharged on Cefdinir + Doxycycline, though reports he stopped taking them after about two days as they weren't helping. He reports a persistent non-productive cough as well as congestion and rhinorrhea. He denies fever, chills, myalgias, n/v/d. Patient's last HD session was on Friday 3/14, however, had to be cut short d/t hypotension.      In ED: VSS, AF. CBC notable for leukocytosis 16k. H/H stable 7.5/23.5. CMP with hyperkalemia 5.8 and hyperphosphatemia 6.0.   Cr 10.9. BNP > 4900. Troponin 2.71, down from approx 40 one month ago. He was placed on 2L NC. Admitted to Hospital Medicine for HD and treatment of PNA.     * No surgery found *      Hospital Course:   Haroldo Dickinson 49 y.o. male placed in observation for shortness of breath.  He presented to the hospital shortness for breath after recent hospital discharge.  His chest x-ray had signs of volume overload.  He had  recently had an incomplete dialysis session outpatient.  He is dialyzed with resolution of his shortness breath and improvement in chest x-ray findings.  There were initial difficulties and obtaining dialysis access, but he completed a full session while inpatient.  A dialysis access ultrasound was obtained she had issues persist in the future.  He is encouraged to complete his antibiotics from previous hospital discharge, as he reported he had not completed them yet.  Suspect his symptoms today are related to volume overload given the resolution with dialysis.  At discharge he is on room air and he will follow up with his outpatient Nephrologist for dialysis.     Goals of Care Treatment Preferences:  Code Status: Full Code    Health care agent: Ariadne Dickinson (sister)  Health care agent number: [phone]          What is most important right now is to focus on extending life as long as possible, even it it means sacrificing quality, curative/life-prolongation (regardless of treatment burdens).  Accordingly, we have decided that the best plan to meet the patient's goals includes continuing with treatment.         Consults:   Consults (From admission, onward)          Status Ordering Provider     Inpatient consult to Palliative Care  Once        Provider:  Mayuri Parekh NP    Acknowledged RIANA ALFRED     Inpatient consult to Registered Dietitian/Nutritionist  Once        Provider:  (Not yet assigned)    Acknowledged RIANA ALFRED     Inpatient consult to Nephrology  Once        Provider:  Indio Baker MD    Completed HERMILO JONES            Assessment & Plan  Acute hypoxemic respiratory failure  Patient with Hypoxic Respiratory failure which is Acute.  he is not on home oxygen. Supplemental oxygen was provided and noted-  2L NC    .   Signs/symptoms of respiratory failure include- respiratory distress. Contributing diagnoses includes - CHF, Pneumonia, and ESRD  Labs and images were reviewed.  Patient Has not had a recent ABG. Will treat underlying causes and adjust management of respiratory failure as follows-     Multifactorial with volume overload in the setting of ESRD/CHF and shortened dialysis as well as possible pneumonia, see below.  Volume management with HD per Nephrology. Unable to initiate Lasix in setting of bilateral nephrectomy.   See plan for CHF below  See plan for multifocal pneumonia below    Multifocal pneumonia  Recently hospitalized at [facility] 03/10 - 03/12. CT Chest showed mild cardiomegaly with bilateral airspace disease with left upper and lower lobe consolidation, patchy infiltrate in the right and bilateral ground-glass infiltrates. Treated with Rocephin and Doxycycline for concerns of CAP. Was discharged on Doxycycline and Cefdinir.    Now with new-onset leukocytosis 16k. CXR Cardiomegaly with patchy interstitial and airspace opacities throughout both lungs, slightly increased in conspicuity from comparison CT 03/10/2025. Presentation more consistent with volume overload, however, will initiate treatment for PNA given leukocytosis and URI symptoms.    Procal ordered, however, often unreliable in ESRD.   RIP, Flu, COVID, sputum culture pending  Continue Zosyn for now. Deescalate as necessary pending cultures.   Supplemental O2 as needed      Hemiparesis affecting right side as late effect of cerebrovascular accident  Noted. No complaints of new focal deficits.    ESRD on hemodialysis  Creatine stable for now. BMP reviewed- noted Estimated Creatinine Clearance: 11.5 mL/min (A) (based on SCr of 7.4 mg/dL (H)). according to latest data. Based on current GFR, CKD stage is end stage.  Monitor UOP and serial BMP and adjust therapy as needed. Renally dose meds. Avoid nephrotoxic medications and procedures.  Nephrology consulted. Plan for HD today.   Essential hypertension  Patient's blood pressure range in the last 24 hours was: BP  Min: 106/55  Max: 131/72.The patient's inpatient  "anti-hypertensive regimen is listed below:  Current Antihypertensives  carvediloL tablet 25 mg, 2 times daily with meals, Oral  isosorbide mononitrate 24 hr tablet 120 mg, Daily, Oral  NIFEdipine 24 hr tablet 60 mg, 2 times daily, Oral  ranolazine 12 hr tablet 500 mg, Daily, Oral  NIFEdipine (PROCARDIA-XL) 24 hr tablet, Daily, Oral    Plan  - BP is controlled, no changes needed to their regimen  S/p nephrectomy  Hx of RCC with bilateral nephrectomy    Anemia in end-stage renal disease  Anemia is likely due to chronic disease due to ESRD. Most recent hemoglobin and hematocrit are listed below.  Recent Labs     03/17/25  0909 03/18/25  0353   HGB 7.5* 8.1*   HCT 23.5* 25.4*     Plan  - Monitor serial CBC: Daily  - Transfuse PRBC if patient becomes hemodynamically unstable, symptomatic or H/H drops below 7/21.  - Patient has not received any PRBC transfusions to date  - Patient's anemia is currently stable  PAD (peripheral artery disease)  Continue ASA, Lipitor, Plavix    Multivessel coronary artery disease  Per [facility] report 02/2025:  "Extensive multivessel coronary artery disease which is not amenable to revascularization (either PCI or bypass surgery - transmural scar in distal LAD/left circumflex/PLB noted on PET scan done in October, 2024) would recommend maximal medical therapy"  Denies chest pain at this time  Continue home medication regimen as BP will allow  ACP (advance care planning)  Palliative care consulted.     Acute on chronic heart failure with preserved ejection fraction (HFpEF)  Patient has Diastolic (HFpEF) heart failure that is Acute on chronic. On presentation their CHF was decompensated. Evidence of decompensated CHF on presentation includes: edema, orthopnea, dyspnea on exertion (OSEGUERA), and shortness of breath. The etiology of their decompensation is likely shortened HD treatment on 03/14. Most recent BNP and echo results are listed below.  Recent Labs     03/17/25  0909   BNP >4,900*     Latest " "ECHO  Results for orders placed during the hospital encounter of 02/09/25    Echo    Interpretation Summary    Left Ventricle: The left ventricle is mildly dilated. Mildly increased wall thickness. There is mild concentric hypertrophy. There is low normal systolic function with a visually estimated ejection fraction of 50 - 55%. Grade II diastolic dysfunction.    Right Ventricle: Normal right ventricular cavity size. Systolic function is normal.    Left Atrium: Left atrium is mildly dilated.    Aortic Valve: The aortic valve is a trileaflet valve. There is moderate aortic valve sclerosis.    Mitral Valve: There is mild to moderate regurgitation.    Tricuspid Valve: There is mild regurgitation.    Current Heart Failure Medications  carvediloL tablet 25 mg, 2 times daily with meals, Oral    Plan  - Monitor strict I&Os and daily weights.    - Place on telemetry  - Low sodium diet  - Place on fluid restriction of 1.5 L.   - Cardiology has not been consulted  - The patient's volume status is stable but not at their baseline as indicated by edema, orthopnea, dyspnea on exertion (OSEGUERA), and shortness of breath  - HD for volume management. Will not initiate Lasix as patient is s/p bilateral nephrectomy.       T2DM (type 2 diabetes mellitus)  Patient's FSGs are uncontrolled due to hyperglycemia on current medication regimen.  Last A1c reviewed-   Lab Results   Component Value Date    HGBA1C 5.1 01/08/2025     Most recent fingerstick glucose reviewed- No results for input(s): "POCTGLUCOSE" in the last 24 hours.  Current correctional scale  Low  Maintain anti-hyperglycemic dose as follows-   Antihyperglycemics (From admission, onward)      Start     Stop Route Frequency Ordered    03/17/25 1258  insulin aspart U-100 pen 0-5 Units         -- SubQ Before meals & nightly PRN 03/17/25 1158          Hold Oral hypoglycemics while patient is in the hospital.  Final Active Diagnoses:    Diagnosis Date Noted POA    PRINCIPAL PROBLEM:  " Acute hypoxemic respiratory failure [J96.01] 09/02/2024 Yes    T2DM (type 2 diabetes mellitus) [E11.9] 03/17/2025 Yes    Acute on chronic heart failure with preserved ejection fraction (HFpEF) [I50.33] 03/11/2025 Yes    Multivessel coronary artery disease [I25.10] 10/29/2024 Yes    ACP (advance care planning) [Z71.89] 10/29/2024 Not Applicable    PAD (peripheral artery disease) [I73.9] 03/10/2024 Yes    Anemia in end-stage renal disease [N18.6, D63.1] 06/14/2022 Yes    S/p nephrectomy [Z90.5] 05/20/2022 Not Applicable    Essential hypertension [I10] 11/21/2019 Yes     Chronic    ESRD on hemodialysis [N18.6, Z99.2] 07/23/2019 Not Applicable    Hemiparesis affecting right side as late effect of cerebrovascular accident [I69.351] 02/15/2016 Not Applicable    Multifocal pneumonia [J18.9] 12/16/2013 Yes      Problems Resolved During this Admission:    Diagnosis Date Noted Date Resolved POA    Triple vessel coronary artery disease [I25.10] 09/03/2024 03/17/2025 Yes    Type 2 diabetes mellitus with chronic kidney disease on chronic dialysis, without long-term current use of insulin [E11.22, N18.6, Z99.2] 03/10/2024 03/17/2025 Not Applicable    Hyperparathyroidism, secondary renal [N25.81] 06/05/2014 03/17/2025 Yes       Discharged Condition: stable    Disposition: Home or Self Care    Follow Up:    Patient Instructions:      Diet renal     Notify your health care provider if you experience any of the following:  temperature >100.4     Notify your health care provider if you experience any of the following:  persistent nausea and vomiting or diarrhea     Notify your health care provider if you experience any of the following:  increased confusion or weakness     Notify your health care provider if you experience any of the following:  persistent dizziness, light-headedness, or visual disturbances     Activity as tolerated       Significant Diagnostic Studies: Labs: CMP   Recent Labs   Lab 03/17/25  0909 03/18/25  0353   NA  140 137   K 5.8* 4.4    99   CO2 18* 25   * 140*   * 65*   CREATININE 10.9* 7.4*   CALCIUM 9.6 8.8   PROT 8.0 7.4   ALBUMIN 2.5* 2.3*   BILITOT 1.1* 1.1*   ALKPHOS 115 108   AST 89* 47*   ALT 99* 85*   ANIONGAP 18* 13    and CBC   Recent Labs   Lab 03/17/25  0909 03/18/25  0353   WBC 16.35* 13.70*   HGB 7.5* 8.1*   HCT 23.5* 25.4*    440       Pending Diagnostic Studies:       Procedure Component Value Units Date/Time     Hemodialysis Access [[id number]] Resulted: 03/18/25 0845    Order Status: Sent Lab Status: In process Updated: 03/18/25 0937           Medications:  Reconciled Home Medications:      Medication List        CONTINUE taking these medications      aspirin 81 MG EC tablet  Commonly known as: ECOTRIN  Take 1 tablet (81 mg total) by mouth once daily.     carvediloL 25 MG tablet  Commonly known as: COREG  Take 1 tablet (25 mg total) by mouth 2 (two) times daily with meals.     clopidogreL 75 mg tablet  Commonly known as: PLAVIX  Take 1 tablet (75 mg total) by mouth once daily.     ezetimibe 10 mg tablet  Commonly known as: ZETIA  Take 1 tablet (10 mg total) by mouth once daily.     hydrALAZINE 25 MG tablet  Commonly known as: APRESOLINE  Take 1 tablet (25 mg total) by mouth every 8 (eight) hours.     isosorbide mononitrate 120 MG 24 hr tablet  Commonly known as: IMDUR  Take 1 tablet (120 mg total) by mouth once daily.     NIFEdipine 60 MG (OSM) 24 hr tablet  Commonly known as: PROCARDIA-XL  Take 1 tablet (60 mg total) by mouth once daily.     nitroGLYCERIN 0.4 MG SL tablet  Commonly known as: NITROSTAT  Place 1 tablet (0.4 mg total) under the tongue every 5 (five) minutes as needed for Chest pain.     ranolazine 500 MG Tb12  Commonly known as: RANEXA  Take 1 tablet (500 mg total) by mouth Daily.     sevelamer carbonate 800 mg Tab  Commonly known as: RENVELA  Take 2 tablets (1,600 mg total) by mouth 3 (three) times daily with meals.            STOP taking these medications       cefdinir 300 MG capsule  Commonly known as: OMNICEF     doxycycline 100 MG tablet  Commonly known as: VIBRA-TABS            ASK your doctor about these medications      atorvastatin 80 MG tablet  Commonly known as: LIPITOR  Take 1 tablet (80 mg total) by mouth once daily.     benzonatate 100 MG capsule  Commonly known as: TESSALON  Take 1 capsule (100 mg total) by mouth 3 (three) times daily as needed for Cough.              Indwelling Lines/Drains at time of discharge:   Lines/Drains/Airways       Drain  Duration                  Hemodialysis AV Fistula Right forearm -- days                    Time spent on the discharge of patient: 36 minutes         Gregg Marks PA-C  Department of Hospital Medicine  [facility] [place] [facility]

## 2025-03-18 NOTE — ASSESSMENT & PLAN NOTE
Patient's blood pressure range in the last 24 hours was: BP  Min: 106/55  Max: 131/72.The patient's inpatient anti-hypertensive regimen is listed below:  Current Antihypertensives  carvediloL tablet 25 mg, 2 times daily with meals, Oral  isosorbide mononitrate 24 hr tablet 120 mg, Daily, Oral  NIFEdipine 24 hr tablet 60 mg, 2 times daily, Oral  ranolazine 12 hr tablet 500 mg, Daily, Oral  NIFEdipine (PROCARDIA-XL) 24 hr tablet, Daily, Oral    Plan  - BP is controlled, no changes needed to their regimen

## 2025-03-18 NOTE — ASSESSMENT & PLAN NOTE
"Per [facility] report 02/2025:  "Extensive multivessel coronary artery disease which is not amenable to revascularization (either PCI or bypass surgery - transmural scar in distal LAD/left circumflex/PLB noted on PET scan done in October, 2024) would recommend maximal medical therapy"  Denies chest pain at this time  Continue home medication regimen as BP will allow  "

## 2025-03-19 NOTE — ASSESSMENT & PLAN NOTE
K  at   Recent Labs   Lab 11/30/22  0951 12/01/22  0748   K 6.1* 5.7*   12/1  K 6.1 s/p HD this AM.   HD today. Ok to discharge home to resume HD in AM per nephrology         "    [facility]      Name:  Ave Correia   Age:  32 y.o.  Sex:  female  :  1993  MRN:  [id number]   Visit Number:  [id number]    Admission Date:  3/14/2025  Date of Discharge:  3/19/2025  Primary Care Physician:  Reyna Loyd APRN    Problem List:     Active Hospital Problems    Diagnosis  POA    Cerebral palsy [G80.9]  Yes    Seizure disorder [G40.909]  Yes      Resolved Hospital Problems    Diagnosis Date Resolved POA    **Altered mental status [R41.82] 2025 Yes     Presenting Problem:    Chief Complaint   Patient presents with    Fall     Pt was brought in from the [facility] for a fall.      Consults:     Consulting Physician(s)         Provider   Role Specialty     Bruce Childs MD      Consulting Physician Psychiatry     Tyler Jackson MD      Consulting Physician Neurology            History of presenting illness/Hospital Course:    Ave Correia is a 30 yo female with a PMH of cerebral palsy, hemiparesis of right dominant side due to non-cerebral vascular etiology, seizures, anxiety and hypothyroidism presenting with altered mental status.  Of note, she is currently staying at a residential program and one of the staff members is at the bedside explaining what has occurred.  It appears that the patient at baseline can have full conversation although her speech is a little impaired and sometimes difficult to understand.  She is also able to ambulate despite having some difficulties due to her left hemiparesis.  It appears that around 3 AM this morning, she woke up screaming \"I hate you\", \"I will kill you\" until about 8 AM.  She went back to her baseline until around 12:30 when she fell to the floor.  The patient then became combative, and refused to get up. Staff did notice that her urine had a foul smell and looked dark.  EMS was called and she was found to be hypotensive and brought to our ED.  At time of admission, the patient " was not following commands, and is groaning.      In ED, blood pressure 66/41, heart rate 75, respiratory rate of 16, temperature 97 O2 saturation 95% on room air.  Labs on arrival showed a sodium 134, potassium 4, BUN 15, creatinine 0.6, WBC 4, hemoglobin 11.7, and platelet count 81.  Patient's random valproic acid over 150.  Chest x-ray showed no acute infiltrates.  CT head showed no acute findings.  The patient was started on IV fluids and her blood pressure has improved to 101/73.  She was subsequently admitted to the medical floor with telemetry for further evaluation.    Inpatient general floor admission 3/14/2025 with altered mental status, hypotension.  Hypotension resolved, shock not suspected.  Assessed by neurology and psychiatry, suspect complications of schizoaffective disorder.  Did have episode of seizure on 3/18 which is not abnormal from her, her vagal nerve stimulator was activated. Clinically improved during course.    Stable for discharge home back to [facility] 3/19/2025.    Encephalopathy, resolved  Dramatic improvement starting 3/18.  Multifactorial including schizoaffective disorder.  Schizoaffective disorder  Abilify increased to 10 mg twice a day per psychiatry recommendation.  Per inpatient neurology Depakote extended release 500 mg twice a day with food.  Keppra discontinued. Continue clobazam 20 mg nighttime only.  Discontinued Lamictal as combination with Depakote can increase chance of Samir Ronald syndrome.  HELD Seroquel 50mg nightly, review with follow-up neurology/psychiatry.  Keep neurology appointment this Thursday 3/20.  Hypovolemic hypotension, resolved  Resolved with provided fluids upon presentation.  Seizure  Had episode of seizure lasted under 1 minute on 3/18.  Reportedly vagal stimulator activated.  Caregiver aware of the seizures and they do happen from time to time.    Chronic: Cerebral palsy, seizure disorder, schizoaffective disorder, hypothyroidism, chronic  thrombocytopenia  Medication changes as listed above.  Recommend follow-up check platelet level with chronic thrombocytopenia.       Vital Signs:    Temp:  [97.5 °F (36.4 °C)-99 °F (37.2 °C)] 97.9 °F (36.6 °C)  Heart Rate:  [] 84  Resp:  [16-19] 16  BP: ()/(63-87) 101/72    Physical Exam:    General Appearance:  Alert and cooperative.    Head:  Atraumatic and normocephalic.  Contracture of the neck noted.   Eyes: Conjunctivae and sclerae normal, no icterus. No pallor. Eyes do not track together.    Throat: No oral lesions, no thrush, oral mucosa moist.   Neck: Supple, trachea midline, no thyromegaly.   Lungs:   Breath sounds heard bilaterally equally.  No wheezing or crackles. No Pleural rub or bronchial breathing.   Heart:  Normal S1 and S2, no murmur, no gallop, no rub. No JVD.   Abdomen:   Normal bowel sounds, no masses, no organomegaly. Soft, nontender, nondistended, no rebound tenderness.   Extremities: Supple, 2+ pitting ankle and leg edema, no cyanosis, no clubbing.  Bilateral and upper and lower extremity contractures noted.   Skin: Warm.   Neurologic: Alert and oriented. Pleasantly and simply conversational. No facial asymmetry. Moves all four limbs but does have generalized weakness. No tremors.      Pertinent Lab Results:     Results from last 7 days   Lab Units 03/18/25  0812 03/16/25  0534 03/15/25  0606 03/14/25  1255   SODIUM mmol/L 133* 138 138 134*   POTASSIUM mmol/L 3.9 4.0 4.7 4.0   CHLORIDE mmol/L 99 104 104 98   CO2 mmol/L 23.0 24.2 28.2 26.8   BUN mg/dL 5* 9 11 15   CREATININE mg/dL 0.67 0.52* 0.64 0.68   CALCIUM mg/dL 8.9 8.3* 7.9* 8.9   BILIRUBIN mg/dL <0.2  --   --  <0.2   ALK PHOS U/L 48  --   --  51   ALT (SGPT) U/L 12  --   --  16   AST (SGOT) U/L 18  --   --  28   GLUCOSE mg/dL 94 77 68 112*     Results from last 7 days   Lab Units 03/18/25  0812 03/16/25  0534 03/15/25  0605   WBC 10*3/mm3 6.40 3.46 4.14   HEMOGLOBIN g/dL 11.0* 10.6* 9.7*   HEMATOCRIT % 32.0* 31.1* 29.2*    PLATELETS 10*3/mm3 32* 60* 66*         Results from last 7 days   Lab Units 03/14/25  1612 03/14/25  1255   HSTROP T ng/L 17* 20*                           Pertinent Radiology Results:    Imaging Results (All)       Procedure Component Value Units Date/Time    CT Head Without Contrast [[id number]] Collected: 03/14/25 1344     Updated: 03/14/25 1349    Narrative:      PROCEDURE: CT HEAD WO CONTRAST-     HISTORY: eval for ams     COMPARISON: None.     TECHNIQUE: Multiple axial CT images were performed from the foramen  magnum to the vertex. Individualized dose reduction techniques using  automated exposure control or adjustment of mA and/or kV according to  the patient size were employed.     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 The ventricles are enlarged. There is  no evidence of edema or hemorrhage.  No masses are identified. No  extra-axial fluid is seen. The paranasal sinuses demonstrate  mucoperiosteal thickening right frontal, bilateral ethmoid and right  maxillary sinuses. No air-fluid level is seen. Remaining paranasal  sinuses and mastoids are clear.       Impression:      Stable chronic change.     Sinus disease as described.        CTDI: 68.52 mGy  DLP:1439.86 mGy.cm     This report was signed and finalized on 3/14/2025 1:47 PM by Donna Bran MD.       XR Chest 1 View [[id number]] Collected: 03/14/25 1332     Updated: 03/14/25 1345    Narrative:      PROCEDURE: XR CHEST 1 VW-     HISTORY: AMS Protocol     COMPARISON: 5/15/2024.     FINDINGS: The lung apices are obscured by patient head positioning,  particularly the right lung apex. There is a left-sided vagal nerve  stimulator. There is elevation of the left hemidiaphragm. There is  decreased inspiratory effort. No acute consolidation within the  visualized lung fields. The heart is normal in size. The mediastinum is  unremarkable. There is no pneumothorax.  There are no acute  osseous  abnormalities.       Impression:      No acute infiltrate                    Images were reviewed, interpreted, and dictated by Dr. Donna Bran MD  Transcribed by Laureano Woods PA-C.     This report was signed and finalized on 3/14/2025 1:43 PM by Donna Bran MD.               Echo:    Results for orders placed during the hospital encounter of 08/15/22    Adult Transthoracic Echo Complete W/ Cont if Necessary Per Protocol    Interpretation Summary  Adequate technical quality.    1.  LV size, function, wall motion, and wall thickness are normal.  Visually estimated ejection fraction is 55 to 60%.  3D ejection fraction 62%.  Normal LV diastolic function and filling pressures.  The atria and right ventricle are normal.  No septal defect or intracavitary mass or thrombus.    2.  Valves are morphologically normal with no stenotic lesions or valve associated masses or thrombi.  There is trivial MR, trivial AI, and trivial to mild TR.    3.  No pericardial or great vessel pathology.    4.  Pulmonary artery systolic pressures are estimated at approximately 30 mmHg.    Condition on Discharge:      Stable.    Code status during the hospital stay:    Code Status and Medical Interventions: CPR (Attempt to Resuscitate); Full Support   Ordered at: 03/14/25 1658     Code Status (Patient has no pulse and is not breathing):    CPR (Attempt to Resuscitate)     Medical Interventions (Patient has pulse or is breathing):    Full Support     Discharge Disposition:    Home or Self Care    Discharge Medications:       Discharge Medications        Changes to Medications        Instructions Start Date   ARIPiprazole 10 MG tablet  Commonly known as: ABILIFY  What changed: when to take this   10 mg, Oral, 2 Times Daily             Continue These Medications        Instructions Start Date   bisacodyl 5 MG EC tablet  Commonly known as: DULCOLAX   5 mg, Oral, Daily PRN      citalopram 20 MG tablet  Commonly known as: CeleXA   20 mg,  Oral, Daily      cloBAZam 20 MG tablet  Commonly known as: ONFI   35 mg      cloBAZam 10 MG tablet  Commonly known as: ONFI   20 mg      diazePAM (15 MG Dose) 2 x 7.5 MG/0.1ML liquid therapy pack  Commonly known as: VALTOCO   15 mg, Nasal, Once As Needed, Instill 1 spray (per device) into one nostril once as needed for seizure. If a second dose is required, it may be administered 4 hours after the initial dose. Do not exceed 2 doses to treat a single episode.      divalproex 500 MG DR tablet  Commonly known as: DEPAKOTE   1,000 mg, 2 Times Daily      FeroSul 325 (65 Fe) MG tablet  Generic drug: ferrous sulfate   325 mg, Daily      hydrOXYzine 50 MG tablet  Commonly known as: ATARAX   50 mg, Oral, 3 Times Daily      Jaimiess 0.15-0.03 &0.01 MG  Generic drug: Levonorgest-Eth Estrad 91-Day   1 tablet, Oral, Daily      Junel 1/20 1-20 MG-MCG per tablet  Generic drug: norethindrone-ethinyl estradiol   1 tablet, Oral, Daily      lamoTRIgine 25 MG tablet  Commonly known as: LaMICtal   Take 4 tablets by mouth Every Night.      levETIRAcetam 500 MG tablet  Commonly known as: KEPPRA   1,000 mg, 2 Times Daily      levothyroxine 75 MCG tablet  Commonly known as: SYNTHROID, LEVOTHROID   75 mcg, Daily      multivitamin with minerals tablet tablet   1 tablet, Oral, Daily      pantoprazole 40 MG EC tablet  Commonly known as: PROTONIX   40 mg, Daily      polyethylene glycol 17 GM/SCOOP powder  Commonly known as: MIRALAX   17 g, Daily             Stop These Medications      QUEtiapine 100 MG tablet  Commonly known as: SEROquel            Discharge Diet:     Diet Instructions       Diet: Regular/House Diet; Regular (IDDSI 7); Thin (IDDSI 0)      Discharge Diet: Regular/House Diet    Texture: Regular (IDDSI 7)    Fluid Consistency: Thin (IDDSI 0)          Activity at Discharge:     Activity Instructions       Activity as Tolerated            Follow-up Appointments:     Follow-up Information       Reyna Loyd APRN Follow up in  3 day(s).    Specialty: Family Medicine  Why: unable to reach office, please call and make an appointment for 3-5 days for hospital follow up  Contact information:  [place] [id number]  [phone]                           Future Appointments   Date Time Provider Department Center   4/10/2025  9:00 AM Teressa Abad APRN [facility] None   5/19/2025  1:30 PM Teressa Abad APRN [facility] None     Test Results Pending at Discharge:    Pending Results       Procedure [Order ID] Specimen - Date/Time    Clobazam (ONFI) [[id number]] Collected: 03/15/25 0606    Specimen: Blood Updated: 03/15/25 0625                 Brian Joseph Kerley, DO  03/19/25  12:30 EDT    Time: I spent 35 minutes on this discharge activity which included: face-to-face encounter with the patient, reviewing the data in the system, coordination of the care with the nursing staff as well as consultants, documentation, and entering orders.     Dictated utilizing Dragon dictation.    I have reviewed the copied text and it is accurate as of 3/19/2025

## 2025-03-21 ENCOUNTER — TELEPHONE (OUTPATIENT)
Dept: TRANSPLANT | Facility: CLINIC | Age: 50
End: 2025-03-21
Payer: MEDICARE

## 2025-03-21 LAB
BACTERIA BLD CULT: NORMAL
BACTERIA BLD CULT: NORMAL

## 2025-03-21 NOTE — TELEPHONE ENCOUNTER
Reminder call placed to patient regarding appointment in [facility] clinic on 03/21. No answer. Patient has no voice mail set up

## 2025-03-24 DIAGNOSIS — Z00.00 ENCOUNTER FOR MEDICARE ANNUAL WELLNESS EXAM: ICD-10-CM

## 2025-04-01 ENCOUNTER — OFFICE VISIT (OUTPATIENT)
Dept: INTERNAL MEDICINE | Facility: CLINIC | Age: 50
End: 2025-04-01
Payer: MEDICARE

## 2025-04-01 VITALS
BODY MASS INDEX: 24.17 KG/M2 | HEART RATE: 83 BPM | WEIGHT: 154.31 LBS | SYSTOLIC BLOOD PRESSURE: 116 MMHG | OXYGEN SATURATION: 96 % | DIASTOLIC BLOOD PRESSURE: 64 MMHG

## 2025-04-01 DIAGNOSIS — N18.6 ESRD ON HEMODIALYSIS: Primary | ICD-10-CM

## 2025-04-01 DIAGNOSIS — R26.2 AMBULATORY DYSFUNCTION: ICD-10-CM

## 2025-04-01 DIAGNOSIS — Z99.2 ESRD ON HEMODIALYSIS: Primary | ICD-10-CM

## 2025-04-01 DIAGNOSIS — I69.351 HEMIPARESIS AFFECTING RIGHT SIDE AS LATE EFFECT OF CEREBROVASCULAR ACCIDENT: ICD-10-CM

## 2025-04-01 DIAGNOSIS — Z09 HOSPITAL DISCHARGE FOLLOW-UP: ICD-10-CM

## 2025-04-01 PROCEDURE — 99999 PR PBB SHADOW E&M-EST. PATIENT-LVL IV: CPT | Mod: PBBFAC,HCNC,, | Performed by: STUDENT IN AN ORGANIZED HEALTH CARE EDUCATION/TRAINING PROGRAM

## 2025-04-01 NOTE — PATIENT INSTRUCTIONS
Referral to Physical Medicine for scooter evaluation has been ordered. You may schedule appointments when you check out today, online, or by calling [phone].

## 2025-04-01 NOTE — PROGRESS NOTES
[facility] FOLLOW-UP VISIT      CHIEF COMPLAINT:   Chief Complaint   Patient presents with    Hospital Follow Up       HISTORY OF PRESENT ILLNESS: Haroldo Dickinson is a 49 y.o. male who presents here today for hospital follow-up. Patient was admitted 3/17-3/18 after presenting with shortness of breath secondary to missed HD x 2. He was admitted for HD with improvement in symptoms. Previously had been admitted 3/10-3/12 for pneumonia and advised to complete antibiotics as well.    Since discharge, patient has been feeling better. Patient denies any shortness of breath, coughing, fever, chills. Patient reports compliance with HD sessions and with medications. He does have some swelling and aching in his legs but this generally improves with dialysis.     Patient has been discussing with  possibility of getting motorized scooter so would like referral for this. He also would like V paperwork filled for handicap.      REVIEW OF SYSTEMS:    ROS as in HPI.       MEDICAL HISTORY:    Past Medical History:   Diagnosis Date    CAD (coronary artery disease), native coronary artery 11/21/2019    Cataract     Diabetes mellitus     Diabetic retinopathy     Dialysis patient     DM type 2 causing renal disease, not at goal     ESRD (end stage renal disease) started dialysis 01/2014 06/05/2014    H/o acute coronary syndrome with high troponin 03/11/2024    History of colon polyps 02/10/2021    History of COVID-19 12/29/2022    Hyperparathyroidism, secondary renal 06/05/2014    Hypertension     Hypertensive emergency 11/27/2023    NSTEMI (non-ST elevated myocardial infarction) 12/21/2013    Organ transplant candidate 06/05/2014    Palliative care encounter 10/29/2024    Pleural effusion 06/07/2024    Pneumonia     Post PTCA 08/26/2020    RCA [facility] 7-25-20    Renal cell carcinoma of right kidney     Renal hypertension     Stroke        MEDICATIONS:    Medications Ordered Prior to Encounter[1]      PHYSICAL  EXAM:    /64 (BP Location: Left arm, Patient Position: Sitting)   Pulse 83   Wt 70 kg (154 lb 5.2 oz)   SpO2 96%   BMI 24.17 kg/m²     Physical Exam  Vitals and nursing note reviewed.   Constitutional:       General: He is not in acute distress.     Appearance: Normal appearance. He is not ill-appearing, toxic-appearing or diaphoretic.   HENT:      Head: Normocephalic and atraumatic.      Nose: Nose normal.   Eyes:      Extraocular Movements: Extraocular movements intact.      Conjunctiva/sclera: Conjunctivae normal.      Pupils: Pupils are equal, round, and reactive to light.   Cardiovascular:      Rate and Rhythm: Normal rate and regular rhythm.      Heart sounds: Normal heart sounds. No murmur heard.  Pulmonary:      Effort: Pulmonary effort is normal. No respiratory distress.   Musculoskeletal:         General: No deformity. Normal range of motion.      Right lower leg: Edema (trace, non-pitting) present.      Left lower leg: Edema (trace, non-pitting) present.   Skin:     Findings: No lesion or rash.   Neurological:      General: No focal deficit present.      Mental Status: He is alert.      Motor: No weakness.      Gait: Gait normal.   Psychiatric:         Mood and Affect: Mood normal.         Behavior: Behavior normal.         Thought Content: Thought content normal.         Judgment: Judgment normal.               ASSESSMENT & PLAN:    Haroldo was seen today for hospital follow up.    Diagnoses and all orders for this visit:    ESRD on hemodialysis  No acute concerns today  Continue HD M/W/F  Continue Nephrology F/U    Hemiparesis affecting right side as late effect of cerebrovascular accident  Ambulatory dysfunction  Patient with difficulty ambulating due to ESRD as well as hemiparesis from prior stroke. Reports activity intolerance after dialysis especially. He struggles to even use wheelchair during these times. He would like to be evaluated for a motor scooter  Referral to PMR to evaluate for  motorized scooter   DMV paperwork for handicap filled out  -     Ambulatory referral/consult to Physical Medicine Rehab; Future    Hospital discharge follow-up  Details as above            Jennifer Patton MD  [facility]         [1]   Current Outpatient Medications on File Prior to Visit   Medication Sig Dispense Refill    aspirin (ECOTRIN) 81 MG EC tablet Take 1 tablet (81 mg total) by mouth once daily. 30 tablet 11    atorvastatin (LIPITOR) 80 MG tablet Take 1 tablet (80 mg total) by mouth once daily. 90 tablet 1    carvediloL (COREG) 25 MG tablet Take 1 tablet (25 mg total) by mouth 2 (two) times daily with meals. 180 tablet 3    clopidogreL (PLAVIX) 75 mg tablet Take 1 tablet (75 mg total) by mouth once daily. 90 tablet 3    ezetimibe (ZETIA) 10 mg tablet Take 1 tablet (10 mg total) by mouth once daily. 90 tablet 3    hydrALAZINE (APRESOLINE) 25 MG tablet Take 1 tablet (25 mg total) by mouth every 8 (eight) hours. 90 tablet 11    isosorbide mononitrate (IMDUR) 120 MG 24 hr tablet Take 1 tablet (120 mg total) by mouth once daily. 90 tablet 1    NIFEdipine (PROCARDIA-XL) 60 MG (OSM) 24 hr tablet Take 1 tablet (60 mg total) by mouth once daily. 30 tablet 1    nitroGLYCERIN (NITROSTAT) 0.4 MG SL tablet Place 1 tablet (0.4 mg total) under the tongue every 5 (five) minutes as needed for Chest pain. 25 tablet 1    ranolazine (RANEXA) 500 MG Tb12 Take 1 tablet (500 mg total) by mouth Daily. 30 tablet 1    sevelamer carbonate (RENVELA) 800 mg Tab Take 2 tablets (1,600 mg total) by mouth 3 (three) times daily with meals. 180 tablet 11     Current Facility-Administered Medications on File Prior to Visit   Medication Dose Route Frequency Provider Last Rate Last Admin    lidocaine (PF) 10 mg/ml (1%) injection 10 mg  1 mL Intradermal Once Michelle Spence MD

## 2025-04-04 ENCOUNTER — TELEPHONE (OUTPATIENT)
Dept: TRANSPLANT | Facility: CLINIC | Age: 50
End: 2025-04-04
Payer: MEDICARE

## 2025-04-04 NOTE — TELEPHONE ENCOUNTER
Reminder call made to patient regarding appointment(s).  No answer.  Could not leave a message, Mailbox is full.

## 2025-05-02 ENCOUNTER — TELEPHONE (OUTPATIENT)
Dept: FAMILY MEDICINE | Facility: CLINIC | Age: 50
End: 2025-05-02
Payer: MEDICARE

## 2025-05-02 NOTE — TELEPHONE ENCOUNTER
----- Message from Judah sent at 5/2/2025 10:07 AM CDT -----  Type:  Sooner Appointment Request Patient is requesting a sooner appointment.  Patient declined first available appointment listed as well as another facility and provider .  Patient will not accept being placed on the waitlist and is requesting a message be sent to doctor. Name of Caller: Self When is the first available appointment?  06/27/2025 10:30 a.m Symptoms: pt would like to speak with pcp regarding paperwork for his social security.  Would the patient rather a call back or a response via [facility]? Call back  Best Call Back Number:  [phone] Additional Information:

## 2025-05-05 ENCOUNTER — PATIENT OUTREACH (OUTPATIENT)
Dept: ADMINISTRATIVE | Facility: OTHER | Age: 50
End: 2025-05-05
Payer: MEDICARE

## 2025-05-07 ENCOUNTER — TELEPHONE (OUTPATIENT)
Dept: FAMILY MEDICINE | Facility: CLINIC | Age: 50
End: 2025-05-07
Payer: MEDICARE

## 2025-05-07 NOTE — TELEPHONE ENCOUNTER
----- Message from Tawana sent at 5/7/2025  3:35 PM CDT -----  Type: Patient Call Back Who called:Self  What is the request in detail:Pt requesting a medication prescription for symptoms of a cold  Can the clinic reply by [facility]? No Would the patient rather a call back or a response via [facility]? Call back Best call back number:.[phone]  Additional Information: Thank you.

## 2025-05-22 ENCOUNTER — TELEPHONE (OUTPATIENT)
Dept: TRANSPLANT | Facility: CLINIC | Age: 50
End: 2025-05-22
Payer: MEDICARE

## 2025-05-22 NOTE — TELEPHONE ENCOUNTER
Called Patient to remind him//her of their appointment with Dr Damon on Tuesday 5/27/25 with/without tests. Patient confirmed.

## 2025-07-07 ENCOUNTER — TELEPHONE (OUTPATIENT)
Dept: TRANSPLANT | Facility: CLINIC | Age: 50
End: 2025-07-07
Payer: MEDICARE

## 2025-07-11 ENCOUNTER — TELEPHONE (OUTPATIENT)
Dept: FAMILY MEDICINE | Facility: CLINIC | Age: 50
End: 2025-07-11
Payer: MEDICARE

## 2025-07-11 NOTE — TELEPHONE ENCOUNTER
Copied from CRM #[id number]. Topic: General Inquiry - Patient Advice  >> Jul 11, 2025  9:38 AM Shena Riggs wrote:  Type: Patient Call Back    Who called:Evelyn  with [employer]     What is the request in detail:calling in regards to lettign the provider know they have an updated health risk assessment and care plan in their system, caller states it can be viewed in the provider portal if the doctor is interested.    Can the clinic reply by [facility]?no     Would the patient rather a call back or a response via [facility]? Call     Best call back number:[phone]    Additional Information:

## 2025-07-17 ENCOUNTER — OFFICE VISIT (OUTPATIENT)
Dept: PODIATRY | Facility: CLINIC | Age: 50
End: 2025-07-17
Payer: MEDICARE

## 2025-07-17 VITALS
BODY MASS INDEX: 24.22 KG/M2 | HEIGHT: 67 IN | SYSTOLIC BLOOD PRESSURE: 129 MMHG | DIASTOLIC BLOOD PRESSURE: 93 MMHG | HEART RATE: 77 BPM | WEIGHT: 154.31 LBS

## 2025-07-17 DIAGNOSIS — B35.1 ONYCHOMYCOSIS DUE TO DERMATOPHYTE: Primary | ICD-10-CM

## 2025-07-17 DIAGNOSIS — E11.22 CONTROLLED TYPE 2 DIABETES MELLITUS WITH CHRONIC KIDNEY DISEASE ON CHRONIC DIALYSIS, WITHOUT LONG-TERM CURRENT USE OF INSULIN: ICD-10-CM

## 2025-07-17 DIAGNOSIS — Z99.2 CONTROLLED TYPE 2 DIABETES MELLITUS WITH CHRONIC KIDNEY DISEASE ON CHRONIC DIALYSIS, WITHOUT LONG-TERM CURRENT USE OF INSULIN: ICD-10-CM

## 2025-07-17 DIAGNOSIS — I69.351 HEMIPARESIS AFFECTING RIGHT SIDE AS LATE EFFECT OF CEREBROVASCULAR ACCIDENT: ICD-10-CM

## 2025-07-17 DIAGNOSIS — N18.6 CONTROLLED TYPE 2 DIABETES MELLITUS WITH CHRONIC KIDNEY DISEASE ON CHRONIC DIALYSIS, WITHOUT LONG-TERM CURRENT USE OF INSULIN: ICD-10-CM

## 2025-07-17 DIAGNOSIS — E11.9 ENCOUNTER FOR DIABETIC FOOT EXAM: ICD-10-CM

## 2025-07-17 PROCEDURE — 99214 OFFICE O/P EST MOD 30 MIN: CPT | Mod: 25,HCNC,S$GLB, | Performed by: PODIATRIST

## 2025-07-17 PROCEDURE — 4010F ACE/ARB THERAPY RXD/TAKEN: CPT | Mod: CPTII,HCNC,S$GLB, | Performed by: PODIATRIST

## 2025-07-17 PROCEDURE — 3044F HG A1C LEVEL LT 7.0%: CPT | Mod: CPTII,HCNC,S$GLB, | Performed by: PODIATRIST

## 2025-07-17 PROCEDURE — 1159F MED LIST DOCD IN RCRD: CPT | Mod: CPTII,HCNC,S$GLB, | Performed by: PODIATRIST

## 2025-07-17 PROCEDURE — 3080F DIAST BP >= 90 MM HG: CPT | Mod: CPTII,HCNC,S$GLB, | Performed by: PODIATRIST

## 2025-07-17 PROCEDURE — 3008F BODY MASS INDEX DOCD: CPT | Mod: CPTII,HCNC,S$GLB, | Performed by: PODIATRIST

## 2025-07-17 PROCEDURE — 99999 PR PBB SHADOW E&M-EST. PATIENT-LVL IV: CPT | Mod: PBBFAC,HCNC,, | Performed by: PODIATRIST

## 2025-07-17 PROCEDURE — 3066F NEPHROPATHY DOC TX: CPT | Mod: CPTII,HCNC,S$GLB, | Performed by: PODIATRIST

## 2025-07-17 PROCEDURE — 11721 DEBRIDE NAIL 6 OR MORE: CPT | Mod: Q9,HCNC,S$GLB, | Performed by: PODIATRIST

## 2025-07-17 PROCEDURE — 3074F SYST BP LT 130 MM HG: CPT | Mod: CPTII,HCNC,S$GLB, | Performed by: PODIATRIST

## 2025-07-17 NOTE — PROGRESS NOTES
Subjective:      Patient ID: Haroldo Dickinson is a 49 y.o. male.    Chief Complaint:   Nail Care and Diabetic Foot Exam (PCP Visit 3/31/25)    Haroldo is a 49 y.o. male who presents to the clinic for evaluation and treatment of high risk feet. Haroldo has a past medical history of CAD (coronary artery disease), native coronary artery (11/21/2019), Cataract, Diabetes mellitus, Diabetic retinopathy, Dialysis patient, DM type 2 causing renal disease, not at goal, ESRD (end stage renal disease) started dialysis 01/2014 (06/05/2014), H/o acute coronary syndrome with high troponin (03/11/2024), History of colon polyps (02/10/2021), History of COVID-19 (12/29/2022), Hyperparathyroidism, secondary renal (06/05/2014), Hypertension, Hypertensive emergency (11/27/2023), NSTEMI (non-ST elevated myocardial infarction) (12/21/2013), Organ transplant candidate (06/05/2014), Palliative care encounter (10/29/2024), Pleural effusion (06/07/2024), Pneumonia, Post PTCA (08/26/2020), Renal cell carcinoma of right kidney, Renal hypertension, and Stroke. The patient's chief complaint is  foot exam    Pt new to me.   Last saw Dr. Olsen 2024  Pt recently moved   Rx [facility]     Pt relates has HD M-W-F    PCP: Mireya Larose MD    Date Last Seen by PCP: 4/1/25      History of Present Illness  Haroldo Dickinson, 49 y.o. male presents today for establishment of care for advanced heart failure, referred from Dr. Hazel. He presents with ICM HFrEF 36% on cPET (10/31/24) with no viability in the LAD or RCA territory. Pmhx of ESRD on dialysis (past 14 years), T2DM, HTN. Pmhx of renal cell Ca s/p bilateral nephrectomy (2 years ago), obstructive CAD, anemia, ESRD on dialysis (14 years), T2DM, PVD, HTN, stroke (2013) with residual LLE weakness.     CTA chest/abd/pelvis done on 6/3/25 showed dense atherosclerotic changes below the knees and severe coronary artery calcifications.     Pt presents today with c/o dyspnea, bilateral ankle swelling, and coughing. He  reported that he normally only uses oxygen after his dialysis visits but that he is using it today due to dyspnea and coughing. Last dialysis was yesterday.         Hemoglobin A1C   Date Value Ref Range Status   07/09/2025 6.5 (H) 4.8 - 5.9 % Final   04/09/2025 5.3 4.8 - 5.9 % Final   01/08/2025 5.1 4.8 - 5.9 % Final   03/11/2024 4.7 4.0 - 5.6 % Final     Comment:     ADA Screening Guidelines:  5.7-6.4%  Consistent with prediabetes  >or=6.5%  Consistent with diabetes    High levels of fetal hemoglobin interfere with the HbA1C  assay. Heterozygous hemoglobin variants (HbS, HgC, etc)do  not significantly interfere with this assay.   However, presence of multiple variants may affect accuracy.     11/27/2023 5.1 4.0 - 5.6 % Final     Comment:     ADA Screening Guidelines:  5.7-6.4%  Consistent with prediabetes  >or=6.5%  Consistent with diabetes    High levels of fetal hemoglobin interfere with the HbA1C  assay. Heterozygous hemoglobin variants (HbS, HgC, etc)do  not significantly interfere with this assay.   However, presence of multiple variants may affect accuracy.     05/02/2023 4.6 4.0 - 5.6 % Final     Comment:     ADA Screening Guidelines:  5.7-6.4%  Consistent with prediabetes  >or=6.5%  Consistent with diabetes    High levels of fetal hemoglobin interfere with the HbA1C  assay. Heterozygous hemoglobin variants (HbS, HgC, etc)do  not significantly interfere with this assay.   However, presence of multiple variants may affect accuracy.          Past Medical History:   Diagnosis Date    CAD (coronary artery disease), native coronary artery 11/21/2019    Cataract     Diabetes mellitus     Diabetic retinopathy     Dialysis patient     DM type 2 causing renal disease, not at goal     ESRD (end stage renal disease) started dialysis 01/2014 06/05/2014    H/o acute coronary syndrome with high troponin 03/11/2024    History of colon polyps 02/10/2021    History of COVID-19 12/29/2022    Hyperparathyroidism, secondary  renal 06/05/2014    Hypertension     Hypertensive emergency 11/27/2023    NSTEMI (non-ST elevated myocardial infarction) 12/21/2013    Organ transplant candidate 06/05/2014    Palliative care encounter 10/29/2024    Pleural effusion 06/07/2024    Pneumonia     Post PTCA 08/26/2020    RCA [facility] 7-25-20    Renal cell carcinoma of right kidney     Renal hypertension     Stroke      Past Surgical History:   Procedure Laterality Date    ANGIOGRAM, CORONARY, WITH LEFT HEART CATHETERIZATION N/A 7/1/2021    Procedure: Angiogram, Coronary, with Left Heart Cath;  Surgeon: Miki Zendejas MD;  Location: [facility] CATH LAB;  Service: Cardiology;  Laterality: N/A;    ANGIOGRAM, CORONARY, WITH LEFT HEART CATHETERIZATION N/A 11/28/2023    Procedure: Angiogram, Coronary, with Left Heart Cath;  Surgeon: Noah Pendleton MD;  Location: [facility] CATH LAB;  Service: Cardiology;  Laterality: N/A;    COLONOSCOPY N/A 5/3/2017    Procedure: COLONOSCOPY;  Surgeon: Rufino Carpenter MD;  Location: [facility] ([address] [place] [address]);  Service: Endoscopy;  Laterality: N/A;  pt states can only schedule on Wednesdays    COLONOSCOPY N/A 2/9/2021    Procedure: COLONOSCOPY;  Surgeon: Edil Wogn MD;  [facility] ([facility]  Service: Endoscopy;  Laterality: N/A;  Dialysis MWF/ labwork day of procedure  right arm aceess  per Dr. Colin pt can hold Plavix 5 days prior see note- sm  COVID test on 2/6/21 at [facility] - sm    COLONOSCOPY N/A 2/10/2021    Procedure: COLONOSCOPY;  Surgeon: Robert Ayers MD;  [facility]  Service: Endoscopy;  Laterality: N/A;  rescheduled due to poor bowel prep-BB  negative covid screening 2/6/21-BB  dialysis M-W-F-BB  okay to r/s for 2/9/21 and to hold Plavix per Dr. KIRAN Wong-BB  labs same day-BB    CORONARY ANGIOGRAPHY N/A 2/10/2025    Procedure: ANGIOGRAM, CORONARY ARTERY;  Surgeon: Ajith Hutchins MD;  Location: [facility] CATH LAB;  Service: Cardiology;  Laterality: N/A;    CORONARY STENT PLACEMENT N/A 7/25/2019     Procedure: INSERTION, STENT, CORONARY ARTERY;  Surgeon: Miki Zendejas MD;  Location: [facility] CATH LAB;  Service: Cardiology;  Laterality: N/A;    DIALYSIS FISTULA CREATION      FISTULOGRAM N/A 2/18/2019    Procedure: Fistulogram;  Surgeon: Yaneth De La Cruz MD;  Location: [facility] CATH LAB;  Service: Cardiology;  Laterality: N/A;    FISTULOGRAM Right 7/23/2019    Procedure: Fistulogram;  Surgeon: Oz Cordoba MD;  Location: [facility] CATH LAB;  Service: Cardiology;  Laterality: Right;    LAPAROSCOPIC ROBOT-ASSISTED SURGICAL REMOVAL OF KIDNEY USING DA JAIME XI Right 5/19/2022    Procedure: XI ROBOTIC NEPHRECTOMY;  Surgeon: Aidan Hendricks MD;  Location: [facility] OR [facility] FLR;  Service: Urology;  Laterality: Right;  3hrs    LAPAROSCOPIC ROBOT-ASSISTED SURGICAL REMOVAL OF KIDNEY USING DA JAIME XI Left 1/5/2023    Procedure: XI ROBOTIC NEPHRECTOMY;  Surgeon: Aidan Hendricks MD;  Location: [address] FLR;  Service: Urology;  Laterality: Left;  4 hrs    LEFT HEART CATHETERIZATION Left 7/25/2019    Procedure: Left heart cath;  Surgeon: Miki Zendejas MD;  Location: [facility] CATH LAB;  Service: Cardiology;  Laterality: Left;    LEFT HEART CATHETERIZATION Left 2/10/2025    Procedure: Left heart cath;  Surgeon: Ajith Hutchins MD;  Location: [facility] CATH LAB;  Service: Cardiology;  Laterality: Left;    REPAIR  1/5/2023    Procedure: REPAIR; COLOTOMY;  Surgeon: Maikel Lamb MD;  Location: [address] FLR;  Service: General;;    RETINAL LASER PROCEDURE Bilateral 2018 or 2017    Dr. Rothman    VASCULAR SURGERY       Medications Ordered Prior to Encounter[1]  Review of patient's allergies indicates:   Allergen Reactions    No known allergies        Review of Systems   Constitutional: Negative for chills, decreased appetite, fever, malaise/fatigue, night sweats, weight gain and weight loss.   Cardiovascular:  Negative for chest pain, claudication, dyspnea on exertion, leg swelling, palpitations and syncope.   Respiratory:   "Negative for cough and shortness of breath.    Endocrine: Negative for cold intolerance and heat intolerance.   Hematologic/Lymphatic: Negative for bleeding problem. Does not bruise/bleed easily.   Skin:  Positive for dry skin and nail changes. Negative for color change, flushing, itching, poor wound healing, rash, skin cancer, suspicious lesions and unusual hair distribution.   Musculoskeletal:  Negative for arthritis, back pain, falls, gout, joint pain, joint swelling, muscle cramps, muscle weakness, myalgias, neck pain and stiffness.   Gastrointestinal:  Negative for diarrhea, nausea and vomiting.   Neurological:  Positive for focal weakness, numbness, paresthesias and weakness. Negative for dizziness, light-headedness, tremors and vertigo.   Psychiatric/Behavioral:  Negative for altered mental status and depression. The patient does not have insomnia.    Allergic/Immunologic: Negative.            Objective:       Vitals:    25 1350   BP: (!) 129/93   Pulse: 77   Weight: 70 kg (154 lb 5.2 oz)   Height: 5' 7" (1.702 m)   PainSc: 0-No pain   70 kg (154 lb 5.2 oz)     Physical Exam  Vitals reviewed.   Constitutional:       General: He is not in acute distress.     Appearance: He is well-developed. He is not ill-appearing, toxic-appearing or diaphoretic.   Cardiovascular:      Pulses:           Dorsalis pedis pulses are 2+ on the right side and 2+ on the left side.        Posterior tibial pulses are 1+ on the right side and 1+ on the left side.   Musculoskeletal:         General: No swelling, tenderness or deformity.      Right lower le+ Edema present.      Left lower le+ Edema present.      Right ankle: Normal.      Right Achilles Tendon: Normal.      Left ankle: Normal.      Left Achilles Tendon: Normal.      Right foot: Decreased range of motion. Prominent metatarsal heads present. No deformity, laceration, tenderness or bony tenderness.      Left foot: Decreased range of motion. Prominent metatarsal " heads present. No deformity, laceration, tenderness or bony tenderness.      Comments: Flexible pes planus foot type w/ medial arch collapse and mild gastroc equinus       Reducible extensor and flexor contractures at the MTPJ and PIPJ of toes 2-5, bilat.          Feet:      Right foot:      Protective Sensation: 10 sites tested.  5 sites sensed.      Skin integrity: No ulcer, blister, skin breakdown, erythema, warmth, callus or dry skin.      Toenail Condition: Right toenails are normal.      Left foot:      Protective Sensation: 10 sites tested.  5 sites sensed.      Skin integrity: No ulcer, blister, skin breakdown, erythema, warmth, callus or dry skin.      Toenail Condition: Left toenails are normal.      Comments: Toenails 1-5 bilaterally are elongated by 7-8 mm, thickened by 3-4mm, discolored/yellowed, dystrophic, brittle with subungual debris.     + IS macerations  Plantar peeling      Skin:     General: Skin is dry.      Capillary Refill: Capillary refill takes 2 to 3 seconds.      Coloration: Skin is not pale.      Findings: No erythema or rash.      Nails: There is no clubbing.   Neurological:      Mental Status: He is alert and oriented to person, place, and time.      Sensory: Sensory deficit present.      Gait: Gait abnormal.      Comments: Wheelchair to room   Psychiatric:         Attention and Perception: Attention normal.         Mood and Affect: Mood normal.         Speech: Speech normal.         Behavior: Behavior normal.         Thought Content: Thought content normal.         Cognition and Memory: Cognition normal.         Judgment: Judgment normal.               Assessment:       Encounter Diagnoses   Name Primary?    Onychomycosis due to dermatophyte Yes    Controlled type 2 diabetes mellitus with chronic kidney disease on chronic dialysis, without long-term current use of insulin     Encounter for diabetic foot exam     Hemiparesis affecting right side as late effect of cerebrovascular  accident          Plan:       Haroldo was seen today for nail care and diabetic foot exam.    Diagnoses and all orders for this visit:    Onychomycosis due to dermatophyte    Controlled type 2 diabetes mellitus with chronic kidney disease on chronic dialysis, without long-term current use of insulin    Encounter for diabetic foot exam    Hemiparesis affecting right side as late effect of cerebrovascular accident      I counseled the patient on his conditions, their implications and medical management.    DM foot exam    - Shoe inspection. Diabetic Foot Education. Patient reminded of the importance of good nutrition and blood sugar control to help prevent podiatric complications of diabetes. Patient instructed on proper foot hygeine. We discussed wearing proper shoe gear, daily foot inspections, never walking without protective shoe gear, never putting sharp instruments to feet, routine podiatric nail visits every 2-3 months.      - With patient's permission, nails were aggressively reduced and debrided x 10 to their soft tissue attachment mechanically removing all offending nail and debris. Patient relates relief following the procedure. He will continue to monitor the areas daily, inspect his feet, wear protective shoe gear when ambulatory, moisturizer to maintain skin integrity and follow in this office in approximately 2-3 months, sooner p.r.n.      Applied g.celio to IS and hallux nails     F/u 3-4 months        Follow up in about 3 months (around 10/17/2025).          [1]   Current Outpatient Medications on File Prior to Visit   Medication Sig Dispense Refill    aspirin (ECOTRIN) 81 MG EC tablet Take 1 tablet (81 mg total) by mouth once daily. 30 tablet 11    atorvastatin (LIPITOR) 80 MG tablet Take 1 tablet (80 mg total) by mouth once daily. 90 tablet 1    calcium acetate,phosphat bind, (PHOSLO) 667 mg capsule Take 1,334 mg by mouth.      carvediloL (COREG) 25 MG tablet Take 1 tablet (25 mg total) by mouth 2  (two) times daily with meals. 180 tablet 3    cefdinir (OMNICEF) 300 MG capsule TAKE ON DIALYSIS DAYS TREATMENT      clopidogreL (PLAVIX) 75 mg tablet Take 1 tablet (75 mg total) by mouth once daily. 90 tablet 3    doxycycline (VIBRA-TABS) 100 MG tablet Take 1 tablet twice a day by oral route for 4 days.      ezetimibe (ZETIA) 10 mg tablet Take 1 tablet (10 mg total) by mouth once daily. 90 tablet 3    ferric citrate (AURYXIA) 210 mg iron Tab 1 tablet with a meal Orally Three times a Week for 30 day(s)      hydrALAZINE (APRESOLINE) 25 MG tablet Take 1 tablet (25 mg total) by mouth every 8 (eight) hours. 90 tablet 11    hydrALAZINE (APRESOLINE) 25 MG tablet Take 1 tablet (25 mg total) by mouth every 8 (eight) hours. 270 tablet 3    isosorbide mononitrate (IMDUR) 120 MG 24 hr tablet Take 1 tablet (120 mg total) by mouth once daily. 90 tablet 1    isosorbide mononitrate (IMDUR) 120 MG 24 hr tablet Take 1 tablet (120 mg total) by mouth once daily. 90 tablet 5    methoxy peg-epoetin beta (MIRCERA INJ) 200 mcg.      methoxy peg-epoetin beta (MIRCERA INJ) 150 mcg.      NIFEdipine (PROCARDIA-XL) 60 MG (OSM) 24 hr tablet Take 1 tablet (60 mg total) by mouth once daily. 30 tablet 1    nitroGLYCERIN (NITROSTAT) 0.4 MG SL tablet Place 1 tablet (0.4 mg total) under the tongue every 5 (five) minutes as needed for Chest pain. 25 tablet 1    ranolazine (RANEXA) 500 MG Tb12 Take 1 tablet (500 mg total) by mouth Daily. 30 tablet 1    SANTYL ointment APPLY TOPICALLY DAILY FOR 10 DAYS.      sevelamer carbonate (RENVELA) 800 mg Tab Take 2 tablets (1,600 mg total) by mouth 3 (three) times daily with meals. 180 tablet 11     Current Facility-Administered Medications on File Prior to Visit   Medication Dose Route Frequency Provider Last Rate Last Admin    lidocaine (PF) 10 mg/ml (1%) injection 10 mg  1 mL Intradermal Once Michelle Spence MD

## 2025-09-01 ENCOUNTER — HOSPITAL ENCOUNTER (EMERGENCY)
Facility: HOSPITAL | Age: 50
Discharge: HOME | End: 2025-09-01
Attending: EMERGENCY MEDICINE
Payer: COMMERCIAL

## 2025-09-01 VITALS
RESPIRATION RATE: 16 BRPM | DIASTOLIC BLOOD PRESSURE: 82 MMHG | HEART RATE: 76 BPM | OXYGEN SATURATION: 98 % | SYSTOLIC BLOOD PRESSURE: 120 MMHG | HEIGHT: 67 IN | BODY MASS INDEX: 23.54 KG/M2 | WEIGHT: 150 LBS | TEMPERATURE: 98 F

## 2025-09-01 DIAGNOSIS — N48.1 BALANITIS: Primary | ICD-10-CM

## 2025-09-01 PROCEDURE — 99282 EMERGENCY DEPT VISIT SF MDM: CPT

## 2025-09-01 RX ORDER — DOXYLAMINE SUCCINATE 25 MG
TABLET ORAL 2 TIMES DAILY
Qty: 30 G | Refills: 0 | Status: ON HOLD | OUTPATIENT
Start: 2025-09-01 | End: 2025-09-15

## 2025-09-01 RX ORDER — ATORVASTATIN CALCIUM 80 MG/1
80 TABLET, FILM COATED ORAL DAILY
Qty: 90 TABLET | Refills: 1 | Status: CANCELLED | OUTPATIENT
Start: 2025-09-01

## 2025-09-02 RX ORDER — ATORVASTATIN CALCIUM 80 MG/1
80 TABLET, FILM COATED ORAL DAILY
Qty: 90 TABLET | Refills: 3 | Status: ON HOLD | OUTPATIENT
Start: 2025-09-02

## 2025-09-04 PROBLEM — N18.6 ESRD (END STAGE RENAL DISEASE) ON DIALYSIS: Status: ACTIVE | Noted: 2025-09-04

## 2025-09-04 PROBLEM — Z99.2 ESRD (END STAGE RENAL DISEASE) ON DIALYSIS: Status: ACTIVE | Noted: 2025-09-04

## 2025-09-05 PROBLEM — R53.83 FATIGUE: Status: ACTIVE | Noted: 2025-09-05

## (undated) DEVICE — SUT 4-0 12-18IN SILK BLACK

## (undated) DEVICE — SEE MEDLINE ITEM 156894

## (undated) DEVICE — TRAY CATH LAB OMC

## (undated) DEVICE — KIT CUSTOM MANIFOLD

## (undated) DEVICE — PAD DEFIB CADENCE ADULT R2

## (undated) DEVICE — CATH BLLN FG APEX MR 2.50X12MM

## (undated) DEVICE — ELECTRODE REM PLYHSV RETURN 9

## (undated) DEVICE — KIT GLIDESHEATH SLEND 6FR 10CM

## (undated) DEVICE — SUT 2/0 27IN PDS II VIO MO

## (undated) DEVICE — TRAY MINOR GEN SURG OMC

## (undated) DEVICE — KIT INTRODUCER MICROPUNCTR 4F

## (undated) DEVICE — Device

## (undated) DEVICE — SET BLOOD ADMIN MACROBRE CLVE

## (undated) DEVICE — SUT D SPECIAL VICRYL 2-0

## (undated) DEVICE — SHEATH PINNACLE 6FRX6CM

## (undated) DEVICE — DRAPE ABDOMINAL TIBURON 14X11

## (undated) DEVICE — CATH DXTERITY JR40 100CM 6FR

## (undated) DEVICE — PAD RADI FEMORAL

## (undated) DEVICE — COVERS PROBE NR-48 STERILE

## (undated) DEVICE — SUT MCRYL PLUS 4-0 PS2 27IN

## (undated) DEVICE — SEAL UNIVERSAL 5MM-8MM XI

## (undated) DEVICE — SHEATH INTRO PINNACLE 6F 10CM

## (undated) DEVICE — SUT PROLENE 4-0 RB-1 BL MO

## (undated) DEVICE — DRAPE COLUMN DAVINCI XI

## (undated) DEVICE — PORT AIRSEAL 12/120MM LPI

## (undated) DEVICE — KIT SYR REUSABLE

## (undated) DEVICE — CANNULA REDUCER 12-8MM

## (undated) DEVICE — SET DECANTER MEDICHOICE

## (undated) DEVICE — KIT MICROINTRO 4F .018X40X7CM

## (undated) DEVICE — OMNIPAQUE 350 200ML

## (undated) DEVICE — DRAPE SCOPE PILLOW WARMER

## (undated) DEVICE — GUIDEWIRE NITINOL

## (undated) DEVICE — WIRE GUIDE SAFE-T-J .035 260CM

## (undated) DEVICE — INFLATOR ENCORE 26 BLLN INFL

## (undated) DEVICE — COVER LIGHT HANDLE

## (undated) DEVICE — NDL INSUFFLATION VERRES 120MM

## (undated) DEVICE — SYR SLIP TIP 20CC

## (undated) DEVICE — TAPE SILK 3IN

## (undated) DEVICE — DEVICE PERCLOSE SUT CLSR 6FR

## (undated) DEVICE — DRAPE ARM DAVINCI XI

## (undated) DEVICE — NDL INSUF ULTRA VERESS 120MM

## (undated) DEVICE — SOL ELECTROLUBE ANTI-STIC

## (undated) DEVICE — ADHESIVE DERMABOND ADVANCED

## (undated) DEVICE — SEE MEDLINE ITEM 157187

## (undated) DEVICE — STAPLER SUREFORM CRVD TIP 45

## (undated) DEVICE — TRAY FOLEY 16FR INFECTION CONT

## (undated) DEVICE — CLIP HEMO-LOK MLX LARGE LF

## (undated) DEVICE — GUIDE LAUNCH 6FR HS11

## (undated) DEVICE — KIT PROCEDURE STER INLET CLOSU

## (undated) DEVICE — APPLICATOR CHLORAPREP ORN 26ML

## (undated) DEVICE — KIT PROBE COVER WITH GEL

## (undated) DEVICE — TRANSDUCER ADULT DISP

## (undated) DEVICE — WIRE WHISPER MS 014 X 190

## (undated) DEVICE — LOOP VESSEL BLUE MAXI

## (undated) DEVICE — TRAY MINOR GEN SURG

## (undated) DEVICE — ANGIOTOUCH KIT

## (undated) DEVICE — CATH GUIDE LINER  V3 6F

## (undated) DEVICE — SUT LIGACLIP SMALL XTRA

## (undated) DEVICE — RELOAD SUREFORM 45 2.5 WHT 6R

## (undated) DEVICE — COVER TIP CURVED SCISSORS XI

## (undated) DEVICE — SOL NS 1000CC

## (undated) DEVICE — SPIKE CONTRAST CONTROLLER

## (undated) DEVICE — ENDOCATCH 15MM

## (undated) DEVICE — SOL CLEARIFY VISUALIZATION LAP

## (undated) DEVICE — DRAPE OPTIMA MAJOR PEDIATRIC

## (undated) DEVICE — SEE MEDLINE ITEM 153688

## (undated) DEVICE — PROTECTION STATION PLUS

## (undated) DEVICE — SHEATH PINNACLE 6FR HIFLO

## (undated) DEVICE — GOWN SURGICAL X-LARGE

## (undated) DEVICE — CLIP SPRING 6MM

## (undated) DEVICE — GUIDEWIRE CHOICE PT  XS 182CM

## (undated) DEVICE — GUIDE LAUNCHER 6FR AL1.5

## (undated) DEVICE — SET TRI-LUMEN FILTERED TUBE

## (undated) DEVICE — OMNIPAQUE CONTRAST 350MG/100ML

## (undated) DEVICE — IRRIGATOR ENDOSCOPY DISP.

## (undated) DEVICE — AV VASCULAR ACCESS PACK

## (undated) DEVICE — STOCKINET 4INX48

## (undated) DEVICE — GAUZE SPONGE PEANUT STRL

## (undated) DEVICE — SHEATH INTRODUCER 6FR 11CM

## (undated) DEVICE — SUT 2-0 12-18IN SILK

## (undated) DEVICE — SHEATH INTRODUCER 4FR 11CM

## (undated) DEVICE — CLIP MED TICALL

## (undated) DEVICE — SUT EASE CROSSBOW CLSR SYS

## (undated) DEVICE — BAG INZII TISS RETRV 12/15MM

## (undated) DEVICE — KIT CO-PILOT

## (undated) DEVICE — GUIDEWIRE SUPRA CORE 035 190CM

## (undated) DEVICE — CATH DXTERITY AL20 100CM 6FR

## (undated) DEVICE — CATH DXTERITY PG145 110CM 6FR

## (undated) DEVICE — KIT TRANSDUCER

## (undated) DEVICE — PACK CATH LAB

## (undated) DEVICE — CATH BLLN FG APEX MR 2.00X20MM

## (undated) DEVICE — CATH DXTERITY JL40 100CM 6FR

## (undated) DEVICE — SUT VICRYL PLUS 3-0 SH 18IN

## (undated) DEVICE — GUIDEWIRE AMPLATZ .035X145 STR

## (undated) DEVICE — SPIKE SHORT LG BORE 1-WAY 2IN

## (undated) DEVICE — SEE MEDLINE ITEM 152622

## (undated) DEVICE — KIT MANIFOLD LOW PRESS TUBING

## (undated) DEVICE — SUT SILK 2-0 SH 18IN BLACK

## (undated) DEVICE — SHEATH 6FR 35CM

## (undated) DEVICE — TOWEL OR XRAY WHITE 17X26IN

## (undated) DEVICE — HEMOSTAT SURGICEL 4X8IN

## (undated) DEVICE — SUT 3-0 12-18IN SILK